# Patient Record
Sex: FEMALE | Race: WHITE | NOT HISPANIC OR LATINO | Employment: UNEMPLOYED | URBAN - METROPOLITAN AREA
[De-identification: names, ages, dates, MRNs, and addresses within clinical notes are randomized per-mention and may not be internally consistent; named-entity substitution may affect disease eponyms.]

---

## 2017-04-29 ENCOUNTER — HOSPITAL ENCOUNTER (EMERGENCY)
Facility: HOSPITAL | Age: 57
Discharge: HOME | End: 2017-04-29
Attending: EMERGENCY MEDICINE | Admitting: EMERGENCY MEDICINE
Payer: COMMERCIAL

## 2017-04-29 VITALS
DIASTOLIC BLOOD PRESSURE: 59 MMHG | OXYGEN SATURATION: 98 % | WEIGHT: 195 LBS | BODY MASS INDEX: 29.65 KG/M2 | SYSTOLIC BLOOD PRESSURE: 120 MMHG | HEART RATE: 49 BPM | RESPIRATION RATE: 20 BRPM | TEMPERATURE: 98.6 F

## 2017-04-29 DIAGNOSIS — G43.909 MIGRAINE: Primary | ICD-10-CM

## 2017-04-29 DIAGNOSIS — F41.9 ANXIETY: ICD-10-CM

## 2017-04-29 PROCEDURE — 96374 THER/PROPH/DIAG INJ IV PUSH: CPT

## 2017-04-29 PROCEDURE — 99283 EMERGENCY DEPT VISIT LOW MDM: CPT

## 2017-04-29 PROCEDURE — 96361 HYDRATE IV INFUSION ADD-ON: CPT

## 2017-04-29 PROCEDURE — 96372 THER/PROPH/DIAG INJ SC/IM: CPT

## 2017-04-29 PROCEDURE — 96375 TX/PRO/DX INJ NEW DRUG ADDON: CPT

## 2017-04-29 RX ORDER — METOCLOPRAMIDE HYDROCHLORIDE 5 MG/ML
10 INJECTION INTRAMUSCULAR; INTRAVENOUS ONCE
Status: COMPLETED | OUTPATIENT
Start: 2017-04-29 | End: 2017-04-29

## 2017-04-29 RX ORDER — LORAZEPAM 2 MG/ML
0.5 INJECTION INTRAMUSCULAR ONCE
Status: COMPLETED | OUTPATIENT
Start: 2017-04-29 | End: 2017-04-29

## 2017-04-29 RX ORDER — OLANZAPINE 10 MG/1
10 INJECTION, POWDER, LYOPHILIZED, FOR SOLUTION INTRAMUSCULAR ONCE
Status: COMPLETED | OUTPATIENT
Start: 2017-04-29 | End: 2017-04-29

## 2017-04-29 RX ADMIN — WATER 2.1 ML: 1 INJECTION INTRAMUSCULAR; INTRAVENOUS; SUBCUTANEOUS at 12:33

## 2017-04-29 RX ADMIN — OLANZAPINE 10 MG: 10 INJECTION, POWDER, FOR SOLUTION INTRAMUSCULAR at 12:33

## 2017-04-29 RX ADMIN — METOCLOPRAMIDE 10 MG: 5 INJECTION, SOLUTION INTRAMUSCULAR; INTRAVENOUS at 11:12

## 2017-04-29 RX ADMIN — Medication 2.1 ML: at 12:33

## 2017-04-29 RX ADMIN — LORAZEPAM 0.5 MG: 2 INJECTION INTRAMUSCULAR; INTRAVENOUS at 11:10

## 2017-04-29 RX ADMIN — SODIUM CHLORIDE 1000 ML: 0.9 INJECTION, SOLUTION INTRAVENOUS at 11:08

## 2017-05-28 ENCOUNTER — HOSPITAL ENCOUNTER (INPATIENT)
Facility: HOSPITAL | Age: 57
LOS: 2 days | Discharge: HOME | DRG: 103 | End: 2017-05-31
Attending: INTERNAL MEDICINE | Admitting: HOSPITALIST
Payer: COMMERCIAL

## 2017-05-28 ENCOUNTER — APPOINTMENT (EMERGENCY)
Dept: RADIOLOGY | Facility: HOSPITAL | Age: 57
DRG: 103 | End: 2017-05-28
Payer: COMMERCIAL

## 2017-05-28 ENCOUNTER — APPOINTMENT (OUTPATIENT)
Dept: MRI IMAGING | Facility: HOSPITAL | Age: 57
DRG: 103 | End: 2017-05-28
Payer: COMMERCIAL

## 2017-05-28 ENCOUNTER — APPOINTMENT (EMERGENCY)
Dept: CT IMAGING | Facility: HOSPITAL | Age: 57
DRG: 103 | End: 2017-05-28
Payer: COMMERCIAL

## 2017-05-28 DIAGNOSIS — G43.909 MIGRAINE: Primary | ICD-10-CM

## 2017-05-28 DIAGNOSIS — R20.2 HEMIPARESTHESIA: ICD-10-CM

## 2017-05-28 PROBLEM — I10 HTN (HYPERTENSION): Chronic | Status: ACTIVE | Noted: 2017-05-28

## 2017-05-28 PROBLEM — E78.5 HLD (HYPERLIPIDEMIA): Chronic | Status: ACTIVE | Noted: 2017-05-28

## 2017-05-28 LAB
ALBUMIN SERPL BCP-MCNC: 3.5 G/DL (ref 3.5–5)
ALP SERPL-CCNC: 102 U/L (ref 46–116)
ALT SERPL W P-5'-P-CCNC: 27 U/L (ref 12–78)
ANION GAP SERPL CALCULATED.3IONS-SCNC: 7 MMOL/L (ref 4–13)
APTT PPP: 28 SECONDS (ref 23–35)
AST SERPL W P-5'-P-CCNC: 17 U/L (ref 5–45)
BASOPHILS # BLD AUTO: 0.05 THOUSANDS/ΜL (ref 0–0.1)
BASOPHILS NFR BLD AUTO: 1 % (ref 0–1)
BILIRUB SERPL-MCNC: 0.25 MG/DL (ref 0.2–1)
BILIRUB UR QL STRIP: NEGATIVE
BUN SERPL-MCNC: 12 MG/DL (ref 5–25)
CALCIUM SERPL-MCNC: 8.8 MG/DL (ref 8.3–10.1)
CHLORIDE SERPL-SCNC: 109 MMOL/L (ref 100–108)
CLARITY UR: CLEAR
CO2 SERPL-SCNC: 30 MMOL/L (ref 21–32)
COLOR UR: YELLOW
CREAT SERPL-MCNC: 0.97 MG/DL (ref 0.6–1.3)
EOSINOPHIL # BLD AUTO: 0.06 THOUSAND/ΜL (ref 0–0.61)
EOSINOPHIL NFR BLD AUTO: 1 % (ref 0–6)
ERYTHROCYTE [DISTWIDTH] IN BLOOD BY AUTOMATED COUNT: 14.4 % (ref 11.6–15.1)
ERYTHROCYTE [SEDIMENTATION RATE] IN BLOOD: 15 MM/HOUR (ref 0–20)
GFR SERPL CREATININE-BSD FRML MDRD: 59 ML/MIN/1.73SQ M
GLUCOSE SERPL-MCNC: 116 MG/DL (ref 65–140)
GLUCOSE UR STRIP-MCNC: NEGATIVE MG/DL
HCT VFR BLD AUTO: 38.3 % (ref 34.8–46.1)
HGB BLD-MCNC: 12.9 G/DL (ref 11.5–15.4)
HGB UR QL STRIP.AUTO: NEGATIVE
INR PPP: 0.91 (ref 0.86–1.16)
KETONES UR STRIP-MCNC: NEGATIVE MG/DL
LEUKOCYTE ESTERASE UR QL STRIP: NEGATIVE
LYMPHOCYTES # BLD AUTO: 1.85 THOUSANDS/ΜL (ref 0.6–4.47)
LYMPHOCYTES NFR BLD AUTO: 19 % (ref 14–44)
MCH RBC QN AUTO: 32.5 PG (ref 26.8–34.3)
MCHC RBC AUTO-ENTMCNC: 33.7 G/DL (ref 31.4–37.4)
MCV RBC AUTO: 97 FL (ref 82–98)
MONOCYTES # BLD AUTO: 0.54 THOUSAND/ΜL (ref 0.17–1.22)
MONOCYTES NFR BLD AUTO: 6 % (ref 4–12)
NEUTROPHILS # BLD AUTO: 7.23 THOUSANDS/ΜL (ref 1.85–7.62)
NEUTS SEG NFR BLD AUTO: 73 % (ref 43–75)
NITRITE UR QL STRIP: NEGATIVE
NRBC BLD AUTO-RTO: 0 /100 WBCS
PH UR STRIP.AUTO: 5.5 [PH] (ref 4.5–8)
PLATELET # BLD AUTO: 368 THOUSANDS/UL (ref 149–390)
PMV BLD AUTO: 9.8 FL (ref 8.9–12.7)
POTASSIUM SERPL-SCNC: 3.2 MMOL/L (ref 3.5–5.3)
PROT SERPL-MCNC: 7.2 G/DL (ref 6.4–8.2)
PROT UR STRIP-MCNC: NEGATIVE MG/DL
PROTHROMBIN TIME: 12.3 SECONDS (ref 12.1–14.4)
RBC # BLD AUTO: 3.97 MILLION/UL (ref 3.81–5.12)
SODIUM SERPL-SCNC: 146 MMOL/L (ref 136–145)
SP GR UR STRIP.AUTO: 1.01 (ref 1–1.03)
UROBILINOGEN UR QL STRIP.AUTO: 0.2 E.U./DL
WBC # BLD AUTO: 9.73 THOUSAND/UL (ref 4.31–10.16)

## 2017-05-28 PROCEDURE — 80053 COMPREHEN METABOLIC PANEL: CPT | Performed by: PHYSICIAN ASSISTANT

## 2017-05-28 PROCEDURE — A9270 NON-COVERED ITEM OR SERVICE: HCPCS | Performed by: PHYSICIAN ASSISTANT

## 2017-05-28 PROCEDURE — 96361 HYDRATE IV INFUSION ADD-ON: CPT

## 2017-05-28 PROCEDURE — 96374 THER/PROPH/DIAG INJ IV PUSH: CPT

## 2017-05-28 PROCEDURE — 85730 THROMBOPLASTIN TIME PARTIAL: CPT | Performed by: PHYSICIAN ASSISTANT

## 2017-05-28 PROCEDURE — 85652 RBC SED RATE AUTOMATED: CPT | Performed by: PHYSICIAN ASSISTANT

## 2017-05-28 PROCEDURE — 96372 THER/PROPH/DIAG INJ SC/IM: CPT

## 2017-05-28 PROCEDURE — 85025 COMPLETE CBC W/AUTO DIFF WBC: CPT | Performed by: PHYSICIAN ASSISTANT

## 2017-05-28 PROCEDURE — 85610 PROTHROMBIN TIME: CPT | Performed by: PHYSICIAN ASSISTANT

## 2017-05-28 PROCEDURE — 96375 TX/PRO/DX INJ NEW DRUG ADDON: CPT

## 2017-05-28 PROCEDURE — 70551 MRI BRAIN STEM W/O DYE: CPT

## 2017-05-28 PROCEDURE — 36415 COLL VENOUS BLD VENIPUNCTURE: CPT | Performed by: PHYSICIAN ASSISTANT

## 2017-05-28 PROCEDURE — 70450 CT HEAD/BRAIN W/O DYE: CPT

## 2017-05-28 PROCEDURE — 70544 MR ANGIOGRAPHY HEAD W/O DYE: CPT

## 2017-05-28 PROCEDURE — 99285 EMERGENCY DEPT VISIT HI MDM: CPT

## 2017-05-28 PROCEDURE — 71020 HB CHEST X-RAY 2VW FRONTAL&LATL: CPT

## 2017-05-28 PROCEDURE — 81003 URINALYSIS AUTO W/O SCOPE: CPT | Performed by: PHYSICIAN ASSISTANT

## 2017-05-28 PROCEDURE — 87493 C DIFF AMPLIFIED PROBE: CPT | Performed by: PHYSICIAN ASSISTANT

## 2017-05-28 RX ORDER — ASPIRIN 325 MG
325 TABLET ORAL DAILY
Status: DISCONTINUED | OUTPATIENT
Start: 2017-05-29 | End: 2017-05-31 | Stop reason: HOSPADM

## 2017-05-28 RX ORDER — ARIPIPRAZOLE 10 MG/1
10 TABLET ORAL DAILY
Status: DISCONTINUED | OUTPATIENT
Start: 2017-05-29 | End: 2017-05-31 | Stop reason: HOSPADM

## 2017-05-28 RX ORDER — DIPHENHYDRAMINE HCL 25 MG
50 TABLET ORAL ONCE
Status: COMPLETED | OUTPATIENT
Start: 2017-05-28 | End: 2017-05-28

## 2017-05-28 RX ORDER — LORAZEPAM 2 MG/ML
2 INJECTION INTRAMUSCULAR ONCE AS NEEDED
Status: COMPLETED | OUTPATIENT
Start: 2017-05-28 | End: 2017-05-28

## 2017-05-28 RX ORDER — KETOROLAC TROMETHAMINE 30 MG/ML
30 INJECTION, SOLUTION INTRAMUSCULAR; INTRAVENOUS EVERY 6 HOURS PRN
Status: DISCONTINUED | OUTPATIENT
Start: 2017-05-28 | End: 2017-05-31 | Stop reason: HOSPADM

## 2017-05-28 RX ORDER — LAMOTRIGINE 100 MG/1
200 TABLET ORAL DAILY
Status: DISCONTINUED | OUTPATIENT
Start: 2017-05-30 | End: 2017-05-31 | Stop reason: HOSPADM

## 2017-05-28 RX ORDER — LORAZEPAM 1 MG/1
2 TABLET ORAL EVERY 6 HOURS PRN
Status: DISCONTINUED | OUTPATIENT
Start: 2017-05-28 | End: 2017-05-29

## 2017-05-28 RX ORDER — OLANZAPINE 10 MG/1
10 INJECTION, POWDER, LYOPHILIZED, FOR SOLUTION INTRAMUSCULAR ONCE
Status: COMPLETED | OUTPATIENT
Start: 2017-05-28 | End: 2017-05-28

## 2017-05-28 RX ORDER — FUROSEMIDE 20 MG/1
20 TABLET ORAL DAILY
Status: DISCONTINUED | OUTPATIENT
Start: 2017-05-28 | End: 2017-05-28

## 2017-05-28 RX ORDER — SODIUM CHLORIDE 9 MG/ML
50 INJECTION, SOLUTION INTRAVENOUS CONTINUOUS
Status: DISCONTINUED | OUTPATIENT
Start: 2017-05-28 | End: 2017-05-31 | Stop reason: HOSPADM

## 2017-05-28 RX ORDER — POTASSIUM CHLORIDE 20 MEQ/1
40 TABLET, EXTENDED RELEASE ORAL ONCE
Status: COMPLETED | OUTPATIENT
Start: 2017-05-28 | End: 2017-05-28

## 2017-05-28 RX ORDER — DIPHENHYDRAMINE HYDROCHLORIDE 50 MG/ML
25 INJECTION INTRAMUSCULAR; INTRAVENOUS EVERY 6 HOURS PRN
Status: DISCONTINUED | OUTPATIENT
Start: 2017-05-28 | End: 2017-05-31 | Stop reason: HOSPADM

## 2017-05-28 RX ORDER — FLUVOXAMINE MALEATE 50 MG/1
50 TABLET, COATED ORAL
Status: DISCONTINUED | OUTPATIENT
Start: 2017-05-28 | End: 2017-05-31 | Stop reason: HOSPADM

## 2017-05-28 RX ORDER — METOCLOPRAMIDE HYDROCHLORIDE 5 MG/ML
10 INJECTION INTRAMUSCULAR; INTRAVENOUS EVERY 6 HOURS PRN
Status: DISCONTINUED | OUTPATIENT
Start: 2017-05-28 | End: 2017-05-31 | Stop reason: HOSPADM

## 2017-05-28 RX ORDER — ZOLPIDEM TARTRATE 5 MG/1
10 TABLET ORAL
Status: DISCONTINUED | OUTPATIENT
Start: 2017-05-28 | End: 2017-05-31 | Stop reason: HOSPADM

## 2017-05-28 RX ORDER — BUTALBITAL, ACETAMINOPHEN AND CAFFEINE 50; 325; 40 MG/1; MG/1; MG/1
1 TABLET ORAL EVERY 4 HOURS PRN
Status: DISCONTINUED | OUTPATIENT
Start: 2017-05-28 | End: 2017-05-28 | Stop reason: SDUPTHER

## 2017-05-28 RX ORDER — LAMOTRIGINE 100 MG/1
100 TABLET ORAL ONCE
Status: COMPLETED | OUTPATIENT
Start: 2017-05-29 | End: 2017-05-29

## 2017-05-28 RX ORDER — HYDROCORTISONE 10 MG/1
10 TABLET ORAL 2 TIMES DAILY
Status: DISCONTINUED | OUTPATIENT
Start: 2017-05-28 | End: 2017-05-31 | Stop reason: HOSPADM

## 2017-05-28 RX ORDER — GABAPENTIN 300 MG/1
600 CAPSULE ORAL 3 TIMES DAILY
Status: DISCONTINUED | OUTPATIENT
Start: 2017-05-28 | End: 2017-05-31 | Stop reason: HOSPADM

## 2017-05-28 RX ORDER — MAGNESIUM SULFATE HEPTAHYDRATE 40 MG/ML
2 INJECTION, SOLUTION INTRAVENOUS EVERY 6 HOURS SCHEDULED
Status: DISCONTINUED | OUTPATIENT
Start: 2017-05-28 | End: 2017-05-30

## 2017-05-28 RX ORDER — BUTALBITAL, ACETAMINOPHEN AND CAFFEINE 50; 325; 40 MG/1; MG/1; MG/1
1 TABLET ORAL EVERY 4 HOURS PRN
Status: DISCONTINUED | OUTPATIENT
Start: 2017-05-28 | End: 2017-05-31 | Stop reason: HOSPADM

## 2017-05-28 RX ORDER — ONDANSETRON 4 MG/1
4 TABLET, ORALLY DISINTEGRATING ORAL EVERY 8 HOURS PRN
Status: DISCONTINUED | OUTPATIENT
Start: 2017-05-28 | End: 2017-05-31 | Stop reason: HOSPADM

## 2017-05-28 RX ORDER — LORAZEPAM 2 MG/ML
1 INJECTION INTRAMUSCULAR ONCE
Status: COMPLETED | OUTPATIENT
Start: 2017-05-28 | End: 2017-05-28

## 2017-05-28 RX ORDER — METOCLOPRAMIDE HYDROCHLORIDE 5 MG/ML
10 INJECTION INTRAMUSCULAR; INTRAVENOUS ONCE
Status: COMPLETED | OUTPATIENT
Start: 2017-05-28 | End: 2017-05-28

## 2017-05-28 RX ORDER — ACETAMINOPHEN 325 MG/1
975 TABLET ORAL ONCE
Status: COMPLETED | OUTPATIENT
Start: 2017-05-28 | End: 2017-05-28

## 2017-05-28 RX ORDER — LORAZEPAM 2 MG/ML
INJECTION INTRAMUSCULAR
Status: DISPENSED
Start: 2017-05-28 | End: 2017-05-29

## 2017-05-28 RX ORDER — MORPHINE SULFATE 2 MG/ML
2 INJECTION, SOLUTION INTRAMUSCULAR; INTRAVENOUS ONCE
Status: COMPLETED | OUTPATIENT
Start: 2017-05-28 | End: 2017-05-28

## 2017-05-28 RX ADMIN — HYDROCORTISONE 10 MG: 10 TABLET ORAL at 17:42

## 2017-05-28 RX ADMIN — ACETAMINOPHEN 975 MG: 325 TABLET, FILM COATED ORAL at 08:14

## 2017-05-28 RX ADMIN — KETOROLAC TROMETHAMINE 30 MG: 30 INJECTION, SOLUTION INTRAMUSCULAR at 16:42

## 2017-05-28 RX ADMIN — DIPHENHYDRAMINE HCL 50 MG: 25 TABLET ORAL at 08:14

## 2017-05-28 RX ADMIN — LORAZEPAM 1 MG: 2 INJECTION INTRAMUSCULAR; INTRAVENOUS at 09:57

## 2017-05-28 RX ADMIN — LORAZEPAM 2 MG: 2 INJECTION INTRAMUSCULAR; INTRAVENOUS at 14:26

## 2017-05-28 RX ADMIN — GABAPENTIN 600 MG: 300 CAPSULE ORAL at 21:30

## 2017-05-28 RX ADMIN — WATER 2.1 ML: 1 INJECTION INTRAMUSCULAR; INTRAVENOUS; SUBCUTANEOUS at 09:09

## 2017-05-28 RX ADMIN — FUROSEMIDE 20 MG: 20 TABLET ORAL at 16:03

## 2017-05-28 RX ADMIN — SODIUM CHLORIDE 75 ML/HR: 0.9 INJECTION, SOLUTION INTRAVENOUS at 14:26

## 2017-05-28 RX ADMIN — DIPHENHYDRAMINE HYDROCHLORIDE 25 MG: 50 INJECTION, SOLUTION INTRAMUSCULAR; INTRAVENOUS at 16:42

## 2017-05-28 RX ADMIN — DIPHENHYDRAMINE HYDROCHLORIDE 25 MG: 50 INJECTION, SOLUTION INTRAMUSCULAR; INTRAVENOUS at 23:59

## 2017-05-28 RX ADMIN — POTASSIUM CHLORIDE 40 MEQ: 1500 TABLET, EXTENDED RELEASE ORAL at 16:03

## 2017-05-28 RX ADMIN — LORAZEPAM 2 MG: 1 TABLET ORAL at 21:29

## 2017-05-28 RX ADMIN — MORPHINE SULFATE 2 MG: 2 INJECTION, SOLUTION INTRAMUSCULAR; INTRAVENOUS at 11:08

## 2017-05-28 RX ADMIN — MAGNESIUM SULFATE HEPTAHYDRATE 2 G: 40 INJECTION, SOLUTION INTRAVENOUS at 19:00

## 2017-05-28 RX ADMIN — OLANZAPINE 10 MG: 10 INJECTION, POWDER, LYOPHILIZED, FOR SOLUTION INTRAMUSCULAR at 09:08

## 2017-05-28 RX ADMIN — SODIUM CHLORIDE 1000 ML: 0.9 INJECTION, SOLUTION INTRAVENOUS at 08:26

## 2017-05-28 RX ADMIN — Medication 2.1 ML: at 09:09

## 2017-05-28 RX ADMIN — METOCLOPRAMIDE 10 MG: 5 INJECTION, SOLUTION INTRAMUSCULAR; INTRAVENOUS at 08:26

## 2017-05-28 RX ADMIN — METOCLOPRAMIDE 10 MG: 5 INJECTION, SOLUTION INTRAMUSCULAR; INTRAVENOUS at 23:59

## 2017-05-28 RX ADMIN — SODIUM CHLORIDE 750 MG: 0.9 INJECTION, SOLUTION INTRAVENOUS at 16:03

## 2017-05-28 RX ADMIN — METOCLOPRAMIDE 10 MG: 5 INJECTION, SOLUTION INTRAMUSCULAR; INTRAVENOUS at 16:42

## 2017-05-28 RX ADMIN — VALPROATE SODIUM 1000 MG: 100 INJECTION, SOLUTION INTRAVENOUS at 17:42

## 2017-05-28 RX ADMIN — FLUVOXAMINE MALEATE 50 MG: 50 TABLET, FILM COATED ORAL at 21:29

## 2017-05-28 RX ADMIN — GABAPENTIN 600 MG: 300 CAPSULE ORAL at 16:03

## 2017-05-29 LAB
ALBUMIN SERPL BCP-MCNC: 2.9 G/DL (ref 3.5–5)
ALP SERPL-CCNC: 96 U/L (ref 46–116)
ALT SERPL W P-5'-P-CCNC: 21 U/L (ref 12–78)
ANION GAP SERPL CALCULATED.3IONS-SCNC: 12 MMOL/L (ref 4–13)
AST SERPL W P-5'-P-CCNC: 13 U/L (ref 5–45)
BILIRUB SERPL-MCNC: 0.19 MG/DL (ref 0.2–1)
BUN SERPL-MCNC: 16 MG/DL (ref 5–25)
C DIFF TOX GENS STL QL NAA+PROBE: NORMAL
CALCIUM SERPL-MCNC: 8.8 MG/DL (ref 8.3–10.1)
CHLORIDE SERPL-SCNC: 110 MMOL/L (ref 100–108)
CO2 SERPL-SCNC: 22 MMOL/L (ref 21–32)
CREAT SERPL-MCNC: 0.94 MG/DL (ref 0.6–1.3)
ERYTHROCYTE [DISTWIDTH] IN BLOOD BY AUTOMATED COUNT: 13.9 % (ref 11.6–15.1)
GFR SERPL CREATININE-BSD FRML MDRD: >60 ML/MIN/1.73SQ M
GLUCOSE SERPL-MCNC: 141 MG/DL (ref 65–140)
HCT VFR BLD AUTO: 37.3 % (ref 34.8–46.1)
HGB BLD-MCNC: 12.1 G/DL (ref 11.5–15.4)
MCH RBC QN AUTO: 31.4 PG (ref 26.8–34.3)
MCHC RBC AUTO-ENTMCNC: 32.4 G/DL (ref 31.4–37.4)
MCV RBC AUTO: 97 FL (ref 82–98)
PLATELET # BLD AUTO: 356 THOUSANDS/UL (ref 149–390)
PMV BLD AUTO: 10.1 FL (ref 8.9–12.7)
POTASSIUM SERPL-SCNC: 3.8 MMOL/L (ref 3.5–5.3)
PROT SERPL-MCNC: 6.7 G/DL (ref 6.4–8.2)
RBC # BLD AUTO: 3.85 MILLION/UL (ref 3.81–5.12)
SODIUM SERPL-SCNC: 144 MMOL/L (ref 136–145)
WBC # BLD AUTO: 8.16 THOUSAND/UL (ref 4.31–10.16)

## 2017-05-29 PROCEDURE — A9270 NON-COVERED ITEM OR SERVICE: HCPCS | Performed by: PHYSICIAN ASSISTANT

## 2017-05-29 PROCEDURE — A9270 NON-COVERED ITEM OR SERVICE: HCPCS | Performed by: PSYCHIATRY & NEUROLOGY

## 2017-05-29 PROCEDURE — 85027 COMPLETE CBC AUTOMATED: CPT | Performed by: INTERNAL MEDICINE

## 2017-05-29 PROCEDURE — A9270 NON-COVERED ITEM OR SERVICE: HCPCS | Performed by: HOSPITALIST

## 2017-05-29 PROCEDURE — 80053 COMPREHEN METABOLIC PANEL: CPT | Performed by: INTERNAL MEDICINE

## 2017-05-29 RX ORDER — LORAZEPAM 1 MG/1
2 TABLET ORAL EVERY 6 HOURS PRN
Status: DISCONTINUED | OUTPATIENT
Start: 2017-05-29 | End: 2017-05-31 | Stop reason: HOSPADM

## 2017-05-29 RX ADMIN — ZOLPIDEM TARTRATE 10 MG: 5 TABLET, FILM COATED ORAL at 23:21

## 2017-05-29 RX ADMIN — VALPROATE SODIUM 1000 MG: 100 INJECTION, SOLUTION INTRAVENOUS at 02:43

## 2017-05-29 RX ADMIN — MAGNESIUM SULFATE HEPTAHYDRATE 2 G: 40 INJECTION, SOLUTION INTRAVENOUS at 06:02

## 2017-05-29 RX ADMIN — MAGNESIUM SULFATE HEPTAHYDRATE 2 G: 40 INJECTION, SOLUTION INTRAVENOUS at 12:16

## 2017-05-29 RX ADMIN — KETOROLAC TROMETHAMINE 30 MG: 30 INJECTION, SOLUTION INTRAMUSCULAR at 00:00

## 2017-05-29 RX ADMIN — DIPHENHYDRAMINE HYDROCHLORIDE 25 MG: 50 INJECTION, SOLUTION INTRAMUSCULAR; INTRAVENOUS at 05:57

## 2017-05-29 RX ADMIN — ZOLPIDEM TARTRATE 10 MG: 5 TABLET, FILM COATED ORAL at 00:05

## 2017-05-29 RX ADMIN — BUTALBITAL, ACETAMINOPHEN, AND CAFFEINE 1 TABLET: 50; 325; 40 TABLET ORAL at 20:17

## 2017-05-29 RX ADMIN — ENOXAPARIN SODIUM 40 MG: 40 INJECTION SUBCUTANEOUS at 12:16

## 2017-05-29 RX ADMIN — VALPROATE SODIUM 1000 MG: 100 INJECTION, SOLUTION INTRAVENOUS at 10:50

## 2017-05-29 RX ADMIN — LAMOTRIGINE 100 MG: 100 TABLET ORAL at 09:19

## 2017-05-29 RX ADMIN — GABAPENTIN 600 MG: 300 CAPSULE ORAL at 09:19

## 2017-05-29 RX ADMIN — KETOROLAC TROMETHAMINE 30 MG: 30 INJECTION, SOLUTION INTRAMUSCULAR at 14:04

## 2017-05-29 RX ADMIN — LORAZEPAM 2 MG: 1 TABLET ORAL at 21:39

## 2017-05-29 RX ADMIN — MAGNESIUM SULFATE HEPTAHYDRATE 2 G: 40 INJECTION, SOLUTION INTRAVENOUS at 23:21

## 2017-05-29 RX ADMIN — ARIPIPRAZOLE 10 MG: 10 TABLET ORAL at 09:19

## 2017-05-29 RX ADMIN — SODIUM CHLORIDE 75 ML/HR: 0.9 INJECTION, SOLUTION INTRAVENOUS at 09:19

## 2017-05-29 RX ADMIN — ASPIRIN 325 MG: 325 TABLET ORAL at 09:19

## 2017-05-29 RX ADMIN — GABAPENTIN 600 MG: 300 CAPSULE ORAL at 17:38

## 2017-05-29 RX ADMIN — KETOROLAC TROMETHAMINE 30 MG: 30 INJECTION, SOLUTION INTRAMUSCULAR at 20:24

## 2017-05-29 RX ADMIN — METOCLOPRAMIDE 10 MG: 5 INJECTION, SOLUTION INTRAMUSCULAR; INTRAVENOUS at 20:22

## 2017-05-29 RX ADMIN — LORAZEPAM 2 MG: 1 TABLET ORAL at 15:34

## 2017-05-29 RX ADMIN — DIPHENHYDRAMINE HYDROCHLORIDE 25 MG: 50 INJECTION, SOLUTION INTRAMUSCULAR; INTRAVENOUS at 14:04

## 2017-05-29 RX ADMIN — DIPHENHYDRAMINE HYDROCHLORIDE 25 MG: 50 INJECTION, SOLUTION INTRAMUSCULAR; INTRAVENOUS at 20:18

## 2017-05-29 RX ADMIN — GABAPENTIN 600 MG: 300 CAPSULE ORAL at 20:17

## 2017-05-29 RX ADMIN — LORAZEPAM 2 MG: 1 TABLET ORAL at 09:19

## 2017-05-29 RX ADMIN — KETOROLAC TROMETHAMINE 30 MG: 30 INJECTION, SOLUTION INTRAMUSCULAR at 06:00

## 2017-05-29 RX ADMIN — BUTALBITAL, ACETAMINOPHEN, AND CAFFEINE 1 TABLET: 50; 325; 40 TABLET ORAL at 06:08

## 2017-05-29 RX ADMIN — SODIUM CHLORIDE 750 MG: 0.9 INJECTION, SOLUTION INTRAVENOUS at 09:20

## 2017-05-29 RX ADMIN — BUTALBITAL, ACETAMINOPHEN, AND CAFFEINE 1 TABLET: 50; 325; 40 TABLET ORAL at 11:00

## 2017-05-29 RX ADMIN — METOCLOPRAMIDE 10 MG: 5 INJECTION, SOLUTION INTRAMUSCULAR; INTRAVENOUS at 05:59

## 2017-05-29 RX ADMIN — METOCLOPRAMIDE 10 MG: 5 INJECTION, SOLUTION INTRAMUSCULAR; INTRAVENOUS at 14:04

## 2017-05-29 RX ADMIN — FLUVOXAMINE MALEATE 50 MG: 50 TABLET, FILM COATED ORAL at 21:39

## 2017-05-29 RX ADMIN — HYDROCORTISONE 10 MG: 10 TABLET ORAL at 17:38

## 2017-05-29 RX ADMIN — HYDROCORTISONE 10 MG: 10 TABLET ORAL at 09:19

## 2017-05-29 RX ADMIN — MAGNESIUM SULFATE HEPTAHYDRATE 2 G: 40 INJECTION, SOLUTION INTRAVENOUS at 00:40

## 2017-05-30 PROCEDURE — A9270 NON-COVERED ITEM OR SERVICE: HCPCS | Performed by: HOSPITALIST

## 2017-05-30 PROCEDURE — A9270 NON-COVERED ITEM OR SERVICE: HCPCS | Performed by: PHYSICIAN ASSISTANT

## 2017-05-30 PROCEDURE — A9270 NON-COVERED ITEM OR SERVICE: HCPCS | Performed by: PSYCHIATRY & NEUROLOGY

## 2017-05-30 RX ORDER — LOPERAMIDE HYDROCHLORIDE 2 MG/1
2 CAPSULE ORAL EVERY 4 HOURS PRN
Status: DISCONTINUED | OUTPATIENT
Start: 2017-05-30 | End: 2017-05-31 | Stop reason: HOSPADM

## 2017-05-30 RX ORDER — DICYCLOMINE HYDROCHLORIDE 10 MG/1
10 CAPSULE ORAL
Status: DISCONTINUED | OUTPATIENT
Start: 2017-05-30 | End: 2017-05-31 | Stop reason: HOSPADM

## 2017-05-30 RX ORDER — LORAZEPAM 1 MG/1
4 TABLET ORAL EVERY 8 HOURS PRN
Status: DISCONTINUED | OUTPATIENT
Start: 2017-05-30 | End: 2017-05-31 | Stop reason: HOSPADM

## 2017-05-30 RX ADMIN — KETOROLAC TROMETHAMINE 30 MG: 30 INJECTION, SOLUTION INTRAMUSCULAR at 08:42

## 2017-05-30 RX ADMIN — LORAZEPAM 2 MG: 1 TABLET ORAL at 06:22

## 2017-05-30 RX ADMIN — DIPHENHYDRAMINE HYDROCHLORIDE 25 MG: 50 INJECTION, SOLUTION INTRAMUSCULAR; INTRAVENOUS at 21:01

## 2017-05-30 RX ADMIN — KETOROLAC TROMETHAMINE 30 MG: 30 INJECTION, SOLUTION INTRAMUSCULAR at 02:24

## 2017-05-30 RX ADMIN — BUTALBITAL, ACETAMINOPHEN, AND CAFFEINE 1 TABLET: 50; 325; 40 TABLET ORAL at 17:56

## 2017-05-30 RX ADMIN — KETOROLAC TROMETHAMINE 30 MG: 30 INJECTION, SOLUTION INTRAMUSCULAR at 14:44

## 2017-05-30 RX ADMIN — DIPHENHYDRAMINE HYDROCHLORIDE 25 MG: 50 INJECTION, SOLUTION INTRAMUSCULAR; INTRAVENOUS at 02:20

## 2017-05-30 RX ADMIN — DIPHENHYDRAMINE HYDROCHLORIDE 25 MG: 50 INJECTION, SOLUTION INTRAMUSCULAR; INTRAVENOUS at 14:44

## 2017-05-30 RX ADMIN — ARIPIPRAZOLE 10 MG: 10 TABLET ORAL at 08:51

## 2017-05-30 RX ADMIN — DICYCLOMINE HYDROCHLORIDE 10 MG: 10 CAPSULE ORAL at 17:41

## 2017-05-30 RX ADMIN — LORAZEPAM 4 MG: 1 TABLET ORAL at 21:03

## 2017-05-30 RX ADMIN — GABAPENTIN 600 MG: 300 CAPSULE ORAL at 17:40

## 2017-05-30 RX ADMIN — LAMOTRIGINE 200 MG: 100 TABLET ORAL at 08:52

## 2017-05-30 RX ADMIN — METOCLOPRAMIDE 10 MG: 5 INJECTION, SOLUTION INTRAMUSCULAR; INTRAVENOUS at 02:22

## 2017-05-30 RX ADMIN — SODIUM CHLORIDE 750 MG: 0.9 INJECTION, SOLUTION INTRAVENOUS at 08:52

## 2017-05-30 RX ADMIN — KETOROLAC TROMETHAMINE 30 MG: 30 INJECTION, SOLUTION INTRAMUSCULAR at 21:01

## 2017-05-30 RX ADMIN — METOCLOPRAMIDE 10 MG: 5 INJECTION, SOLUTION INTRAMUSCULAR; INTRAVENOUS at 21:00

## 2017-05-30 RX ADMIN — METOCLOPRAMIDE 10 MG: 5 INJECTION, SOLUTION INTRAMUSCULAR; INTRAVENOUS at 14:44

## 2017-05-30 RX ADMIN — DICYCLOMINE HYDROCHLORIDE 10 MG: 10 CAPSULE ORAL at 10:51

## 2017-05-30 RX ADMIN — FLUVOXAMINE MALEATE 50 MG: 50 TABLET, FILM COATED ORAL at 21:01

## 2017-05-30 RX ADMIN — LOPERAMIDE HYDROCHLORIDE 2 MG: 2 CAPSULE ORAL at 10:51

## 2017-05-30 RX ADMIN — BUTALBITAL, ACETAMINOPHEN, AND CAFFEINE 1 TABLET: 50; 325; 40 TABLET ORAL at 11:33

## 2017-05-30 RX ADMIN — HYDROCORTISONE 10 MG: 10 TABLET ORAL at 08:52

## 2017-05-30 RX ADMIN — ASPIRIN 325 MG: 325 TABLET ORAL at 08:51

## 2017-05-30 RX ADMIN — ENOXAPARIN SODIUM 40 MG: 40 INJECTION SUBCUTANEOUS at 08:51

## 2017-05-30 RX ADMIN — BUTALBITAL, ACETAMINOPHEN, AND CAFFEINE 1 TABLET: 50; 325; 40 TABLET ORAL at 07:49

## 2017-05-30 RX ADMIN — LORAZEPAM 2 MG: 1 TABLET ORAL at 14:44

## 2017-05-30 RX ADMIN — BUTALBITAL, ACETAMINOPHEN, AND CAFFEINE 1 TABLET: 50; 325; 40 TABLET ORAL at 02:19

## 2017-05-30 RX ADMIN — METOCLOPRAMIDE 10 MG: 5 INJECTION, SOLUTION INTRAMUSCULAR; INTRAVENOUS at 08:42

## 2017-05-30 RX ADMIN — HYDROCORTISONE 10 MG: 10 TABLET ORAL at 17:41

## 2017-05-30 RX ADMIN — DIPHENHYDRAMINE HYDROCHLORIDE 25 MG: 50 INJECTION, SOLUTION INTRAMUSCULAR; INTRAVENOUS at 08:42

## 2017-05-30 RX ADMIN — MAGNESIUM SULFATE HEPTAHYDRATE 2 G: 40 INJECTION, SOLUTION INTRAVENOUS at 05:39

## 2017-05-30 RX ADMIN — GABAPENTIN 600 MG: 300 CAPSULE ORAL at 08:52

## 2017-05-30 RX ADMIN — GABAPENTIN 600 MG: 300 CAPSULE ORAL at 21:01

## 2017-05-30 RX ADMIN — SODIUM CHLORIDE 50 ML/HR: 0.9 INJECTION, SOLUTION INTRAVENOUS at 05:46

## 2017-05-31 VITALS
SYSTOLIC BLOOD PRESSURE: 134 MMHG | DIASTOLIC BLOOD PRESSURE: 63 MMHG | RESPIRATION RATE: 20 BRPM | TEMPERATURE: 98.2 F | HEART RATE: 54 BPM | OXYGEN SATURATION: 96 % | BODY MASS INDEX: 29.1 KG/M2 | WEIGHT: 191.4 LBS

## 2017-05-31 PROBLEM — G43.909 MIGRAINE: Status: RESOLVED | Noted: 2017-05-28 | Resolved: 2017-05-31

## 2017-05-31 PROBLEM — R20.2 HEMIPARESTHESIA: Status: RESOLVED | Noted: 2017-05-28 | Resolved: 2017-05-31

## 2017-05-31 LAB
ANION GAP SERPL CALCULATED.3IONS-SCNC: 8 MMOL/L (ref 4–13)
BUN SERPL-MCNC: 16 MG/DL (ref 5–25)
CALCIUM SERPL-MCNC: 8.4 MG/DL (ref 8.3–10.1)
CHLORIDE SERPL-SCNC: 110 MMOL/L (ref 100–108)
CO2 SERPL-SCNC: 26 MMOL/L (ref 21–32)
CREAT SERPL-MCNC: 0.82 MG/DL (ref 0.6–1.3)
GFR SERPL CREATININE-BSD FRML MDRD: >60 ML/MIN/1.73SQ M
GLUCOSE SERPL-MCNC: 126 MG/DL (ref 65–140)
POTASSIUM SERPL-SCNC: 3.7 MMOL/L (ref 3.5–5.3)
SODIUM SERPL-SCNC: 144 MMOL/L (ref 136–145)

## 2017-05-31 PROCEDURE — A9270 NON-COVERED ITEM OR SERVICE: HCPCS | Performed by: PSYCHIATRY & NEUROLOGY

## 2017-05-31 PROCEDURE — A9270 NON-COVERED ITEM OR SERVICE: HCPCS | Performed by: PHYSICIAN ASSISTANT

## 2017-05-31 PROCEDURE — A9270 NON-COVERED ITEM OR SERVICE: HCPCS | Performed by: HOSPITALIST

## 2017-05-31 PROCEDURE — 80048 BASIC METABOLIC PNL TOTAL CA: CPT | Performed by: HOSPITALIST

## 2017-05-31 RX ORDER — METHYLPREDNISOLONE 4 MG/1
4 TABLET ORAL DAILY
Status: DISCONTINUED | OUTPATIENT
Start: 2017-06-05 | End: 2017-05-31 | Stop reason: HOSPADM

## 2017-05-31 RX ORDER — LOPERAMIDE HYDROCHLORIDE 2 MG/1
2 CAPSULE ORAL EVERY 4 HOURS PRN
Qty: 10 CAPSULE | Refills: 0 | Status: SHIPPED | OUTPATIENT
Start: 2017-05-31 | End: 2017-06-02

## 2017-05-31 RX ORDER — DICYCLOMINE HYDROCHLORIDE 10 MG/1
10 CAPSULE ORAL
Qty: 20 CAPSULE | Refills: 0 | Status: ON HOLD | OUTPATIENT
Start: 2017-05-31 | End: 2017-07-29 | Stop reason: ALTCHOICE

## 2017-05-31 RX ORDER — BUTALBITAL, ACETAMINOPHEN AND CAFFEINE 50; 325; 40 MG/1; MG/1; MG/1
1 TABLET ORAL EVERY 4 HOURS PRN
Qty: 20 TABLET | Refills: 0 | Status: SHIPPED | OUTPATIENT
Start: 2017-05-31 | End: 2017-07-29

## 2017-05-31 RX ORDER — METHYLPREDNISOLONE 4 MG/1
24 TABLET ORAL DAILY
Qty: 21 TABLET | Refills: 0 | Status: SHIPPED | OUTPATIENT
Start: 2017-05-31 | End: 2017-06-01

## 2017-05-31 RX ORDER — METHYLPREDNISOLONE 8 MG/1
24 TABLET ORAL DAILY
Qty: 21 TABLET | Refills: 0 | Status: SHIPPED | OUTPATIENT
Start: 2017-05-31 | End: 2017-05-31

## 2017-05-31 RX ORDER — METHYLPREDNISOLONE 16 MG/1
16 TABLET ORAL DAILY
Status: DISCONTINUED | OUTPATIENT
Start: 2017-06-02 | End: 2017-05-31 | Stop reason: HOSPADM

## 2017-05-31 RX ORDER — METHYLPREDNISOLONE 4 MG/1
8 TABLET ORAL DAILY
Status: DISCONTINUED | OUTPATIENT
Start: 2017-06-04 | End: 2017-05-31 | Stop reason: HOSPADM

## 2017-05-31 RX ORDER — METHYLPREDNISOLONE 4 MG/1
12 TABLET ORAL DAILY
Status: DISCONTINUED | OUTPATIENT
Start: 2017-06-03 | End: 2017-05-31 | Stop reason: HOSPADM

## 2017-05-31 RX ADMIN — DICYCLOMINE HYDROCHLORIDE 10 MG: 10 CAPSULE ORAL at 11:57

## 2017-05-31 RX ADMIN — ONDANSETRON 4 MG: 4 TABLET, ORALLY DISINTEGRATING ORAL at 12:48

## 2017-05-31 RX ADMIN — ENOXAPARIN SODIUM 40 MG: 40 INJECTION SUBCUTANEOUS at 08:53

## 2017-05-31 RX ADMIN — ASPIRIN 325 MG: 325 TABLET ORAL at 08:52

## 2017-05-31 RX ADMIN — BUTALBITAL, ACETAMINOPHEN, AND CAFFEINE 1 TABLET: 50; 325; 40 TABLET ORAL at 01:15

## 2017-05-31 RX ADMIN — BUTALBITAL, ACETAMINOPHEN, AND CAFFEINE 1 TABLET: 50; 325; 40 TABLET ORAL at 05:41

## 2017-05-31 RX ADMIN — METOCLOPRAMIDE 10 MG: 5 INJECTION, SOLUTION INTRAMUSCULAR; INTRAVENOUS at 04:22

## 2017-05-31 RX ADMIN — ZOLPIDEM TARTRATE 10 MG: 5 TABLET, FILM COATED ORAL at 01:15

## 2017-05-31 RX ADMIN — KETOROLAC TROMETHAMINE 30 MG: 30 INJECTION, SOLUTION INTRAMUSCULAR at 10:22

## 2017-05-31 RX ADMIN — HYDROCORTISONE 10 MG: 10 TABLET ORAL at 08:58

## 2017-05-31 RX ADMIN — DIPHENHYDRAMINE HYDROCHLORIDE 25 MG: 50 INJECTION, SOLUTION INTRAMUSCULAR; INTRAVENOUS at 10:18

## 2017-05-31 RX ADMIN — BUTALBITAL, ACETAMINOPHEN, AND CAFFEINE 1 TABLET: 50; 325; 40 TABLET ORAL at 10:32

## 2017-05-31 RX ADMIN — LAMOTRIGINE 200 MG: 100 TABLET ORAL at 08:52

## 2017-05-31 RX ADMIN — METHYLPREDNISOLONE 24 MG: 16 TABLET ORAL at 11:57

## 2017-05-31 RX ADMIN — DICYCLOMINE HYDROCHLORIDE 10 MG: 10 CAPSULE ORAL at 06:00

## 2017-05-31 RX ADMIN — LORAZEPAM 2 MG: 1 TABLET ORAL at 09:01

## 2017-05-31 RX ADMIN — DIPHENHYDRAMINE HYDROCHLORIDE 25 MG: 50 INJECTION, SOLUTION INTRAMUSCULAR; INTRAVENOUS at 04:20

## 2017-05-31 RX ADMIN — METOCLOPRAMIDE 10 MG: 5 INJECTION, SOLUTION INTRAMUSCULAR; INTRAVENOUS at 10:25

## 2017-05-31 RX ADMIN — KETOROLAC TROMETHAMINE 30 MG: 30 INJECTION, SOLUTION INTRAMUSCULAR at 04:20

## 2017-05-31 RX ADMIN — GABAPENTIN 600 MG: 300 CAPSULE ORAL at 08:52

## 2017-05-31 RX ADMIN — ARIPIPRAZOLE 10 MG: 10 TABLET ORAL at 08:58

## 2017-06-03 ENCOUNTER — APPOINTMENT (EMERGENCY)
Dept: RADIOLOGY | Facility: HOSPITAL | Age: 57
End: 2017-06-03
Payer: COMMERCIAL

## 2017-06-03 ENCOUNTER — HOSPITAL ENCOUNTER (EMERGENCY)
Facility: HOSPITAL | Age: 57
Discharge: HOME | End: 2017-06-03
Attending: EMERGENCY MEDICINE | Admitting: EMERGENCY MEDICINE
Payer: COMMERCIAL

## 2017-06-03 VITALS
TEMPERATURE: 99.2 F | RESPIRATION RATE: 16 BRPM | SYSTOLIC BLOOD PRESSURE: 123 MMHG | DIASTOLIC BLOOD PRESSURE: 63 MMHG | BODY MASS INDEX: 29.04 KG/M2 | OXYGEN SATURATION: 96 % | WEIGHT: 191 LBS | HEART RATE: 65 BPM

## 2017-06-03 DIAGNOSIS — G43.919 INTRACTABLE MIGRAINE: Primary | ICD-10-CM

## 2017-06-03 LAB
ANION GAP SERPL CALCULATED.3IONS-SCNC: 5 MMOL/L (ref 4–13)
BASOPHILS # BLD MANUAL: 0 THOUSAND/UL (ref 0–0.1)
BASOPHILS NFR MAR MANUAL: 0 % (ref 0–1)
BUN SERPL-MCNC: 14 MG/DL (ref 5–25)
CALCIUM SERPL-MCNC: 8.3 MG/DL (ref 8.3–10.1)
CHLORIDE SERPL-SCNC: 104 MMOL/L (ref 100–108)
CO2 SERPL-SCNC: 33 MMOL/L (ref 21–32)
CREAT SERPL-MCNC: 0.88 MG/DL (ref 0.6–1.3)
EOSINOPHIL # BLD MANUAL: 0.23 THOUSAND/UL (ref 0–0.4)
EOSINOPHIL NFR BLD MANUAL: 2 % (ref 0–6)
ERYTHROCYTE [DISTWIDTH] IN BLOOD BY AUTOMATED COUNT: 14.3 % (ref 11.6–15.1)
GFR SERPL CREATININE-BSD FRML MDRD: >60 ML/MIN/1.73SQ M
GLUCOSE SERPL-MCNC: 113 MG/DL (ref 65–140)
HCT VFR BLD AUTO: 37.8 % (ref 34.8–46.1)
HGB BLD-MCNC: 12.7 G/DL (ref 11.5–15.4)
LYMPHOCYTES # BLD AUTO: 2.68 THOUSAND/UL (ref 0.6–4.47)
LYMPHOCYTES # BLD AUTO: 23 % (ref 14–44)
MCH RBC QN AUTO: 32.6 PG (ref 26.8–34.3)
MCHC RBC AUTO-ENTMCNC: 33.6 G/DL (ref 31.4–37.4)
MCV RBC AUTO: 97 FL (ref 82–98)
MONOCYTES # BLD AUTO: 0.35 THOUSAND/UL (ref 0–1.22)
MONOCYTES NFR BLD: 3 % (ref 4–12)
NEUTROPHILS # BLD MANUAL: 8.4 THOUSAND/UL (ref 1.85–7.62)
NEUTS SEG NFR BLD AUTO: 72 % (ref 43–75)
NRBC BLD AUTO-RTO: 0 /100 WBCS
PLATELET # BLD AUTO: 307 THOUSANDS/UL (ref 149–390)
PLATELET BLD QL SMEAR: ADEQUATE
PMV BLD AUTO: 10.2 FL (ref 8.9–12.7)
POTASSIUM SERPL-SCNC: 3.5 MMOL/L (ref 3.5–5.3)
RBC # BLD AUTO: 3.89 MILLION/UL (ref 3.81–5.12)
RBC MORPH BLD: NORMAL
SODIUM SERPL-SCNC: 142 MMOL/L (ref 136–145)
TOTAL CELLS COUNTED SPEC: 100
WBC # BLD AUTO: 11.67 THOUSAND/UL (ref 4.31–10.16)

## 2017-06-03 PROCEDURE — 99284 EMERGENCY DEPT VISIT MOD MDM: CPT

## 2017-06-03 PROCEDURE — 96365 THER/PROPH/DIAG IV INF INIT: CPT

## 2017-06-03 PROCEDURE — A9270 NON-COVERED ITEM OR SERVICE: HCPCS | Performed by: PHYSICIAN ASSISTANT

## 2017-06-03 PROCEDURE — 73564 X-RAY EXAM KNEE 4 OR MORE: CPT

## 2017-06-03 PROCEDURE — 80048 BASIC METABOLIC PNL TOTAL CA: CPT | Performed by: PHYSICIAN ASSISTANT

## 2017-06-03 PROCEDURE — 36415 COLL VENOUS BLD VENIPUNCTURE: CPT | Performed by: PHYSICIAN ASSISTANT

## 2017-06-03 PROCEDURE — 96361 HYDRATE IV INFUSION ADD-ON: CPT

## 2017-06-03 PROCEDURE — 85027 COMPLETE CBC AUTOMATED: CPT | Performed by: PHYSICIAN ASSISTANT

## 2017-06-03 PROCEDURE — 96375 TX/PRO/DX INJ NEW DRUG ADDON: CPT

## 2017-06-03 PROCEDURE — 85007 BL SMEAR W/DIFF WBC COUNT: CPT | Performed by: PHYSICIAN ASSISTANT

## 2017-06-03 RX ORDER — MAGNESIUM SULFATE HEPTAHYDRATE 40 MG/ML
2 INJECTION, SOLUTION INTRAVENOUS ONCE
Status: COMPLETED | OUTPATIENT
Start: 2017-06-03 | End: 2017-06-03

## 2017-06-03 RX ORDER — METHYLPREDNISOLONE SODIUM SUCCINATE 40 MG/ML
40 INJECTION, POWDER, LYOPHILIZED, FOR SOLUTION INTRAMUSCULAR; INTRAVENOUS ONCE
Status: COMPLETED | OUTPATIENT
Start: 2017-06-03 | End: 2017-06-03

## 2017-06-03 RX ORDER — ACETAMINOPHEN 325 MG/1
650 TABLET ORAL ONCE
Status: COMPLETED | OUTPATIENT
Start: 2017-06-03 | End: 2017-06-03

## 2017-06-03 RX ORDER — LIDOCAINE HYDROCHLORIDE 40 MG/ML
5 SOLUTION TOPICAL ONCE
Status: COMPLETED | OUTPATIENT
Start: 2017-06-03 | End: 2017-06-03

## 2017-06-03 RX ORDER — DIPHENHYDRAMINE HYDROCHLORIDE 50 MG/ML
25 INJECTION INTRAMUSCULAR; INTRAVENOUS ONCE
Status: COMPLETED | OUTPATIENT
Start: 2017-06-03 | End: 2017-06-03

## 2017-06-03 RX ORDER — METOCLOPRAMIDE HYDROCHLORIDE 5 MG/ML
10 INJECTION INTRAMUSCULAR; INTRAVENOUS ONCE
Status: COMPLETED | OUTPATIENT
Start: 2017-06-03 | End: 2017-06-03

## 2017-06-03 RX ADMIN — MAGNESIUM SULFATE HEPTAHYDRATE 2 G: 40 INJECTION, SOLUTION INTRAVENOUS at 12:51

## 2017-06-03 RX ADMIN — LIDOCAINE HYDROCHLORIDE 5 ML: 40 SOLUTION TOPICAL at 13:31

## 2017-06-03 RX ADMIN — KETAMINE HYDROCHLORIDE: 50 INJECTION, SOLUTION INTRAMUSCULAR; INTRAVENOUS at 14:51

## 2017-06-03 RX ADMIN — SODIUM CHLORIDE 1000 ML: 0.9 INJECTION, SOLUTION INTRAVENOUS at 12:38

## 2017-06-03 RX ADMIN — ACETAMINOPHEN 650 MG: 325 TABLET, FILM COATED ORAL at 12:38

## 2017-06-03 RX ADMIN — DIPHENHYDRAMINE HYDROCHLORIDE 25 MG: 50 INJECTION, SOLUTION INTRAMUSCULAR; INTRAVENOUS at 12:38

## 2017-06-03 RX ADMIN — METHYLPREDNISOLONE SODIUM SUCCINATE 40 MG: 40 INJECTION, POWDER, FOR SOLUTION INTRAMUSCULAR; INTRAVENOUS at 12:38

## 2017-06-03 RX ADMIN — METOCLOPRAMIDE 10 MG: 5 INJECTION, SOLUTION INTRAMUSCULAR; INTRAVENOUS at 12:38

## 2017-06-04 ENCOUNTER — APPOINTMENT (EMERGENCY)
Dept: RADIOLOGY | Facility: HOSPITAL | Age: 57
End: 2017-06-04
Payer: COMMERCIAL

## 2017-06-04 ENCOUNTER — HOSPITAL ENCOUNTER (EMERGENCY)
Facility: HOSPITAL | Age: 57
Discharge: HOME | End: 2017-06-04
Attending: EMERGENCY MEDICINE
Payer: COMMERCIAL

## 2017-06-04 VITALS
WEIGHT: 211.2 LBS | TEMPERATURE: 98.2 F | RESPIRATION RATE: 16 BRPM | SYSTOLIC BLOOD PRESSURE: 129 MMHG | HEART RATE: 62 BPM | DIASTOLIC BLOOD PRESSURE: 69 MMHG | OXYGEN SATURATION: 94 % | BODY MASS INDEX: 32.11 KG/M2

## 2017-06-04 DIAGNOSIS — R07.89 ATYPICAL CHEST PAIN: Primary | ICD-10-CM

## 2017-06-04 DIAGNOSIS — R29.6 RECURRENT FALLS: ICD-10-CM

## 2017-06-04 LAB
ALBUMIN SERPL BCP-MCNC: 2.9 G/DL (ref 3.5–5)
ALP SERPL-CCNC: 82 U/L (ref 46–116)
ALT SERPL W P-5'-P-CCNC: 33 U/L (ref 12–78)
ANION GAP SERPL CALCULATED.3IONS-SCNC: 7 MMOL/L (ref 4–13)
AST SERPL W P-5'-P-CCNC: 25 U/L (ref 5–45)
BASOPHILS # BLD AUTO: 0.02 THOUSANDS/ΜL (ref 0–0.1)
BASOPHILS NFR BLD AUTO: 0 % (ref 0–1)
BILIRUB SERPL-MCNC: 0.27 MG/DL (ref 0.2–1)
BUN SERPL-MCNC: 17 MG/DL (ref 5–25)
CALCIUM SERPL-MCNC: 8.1 MG/DL (ref 8.3–10.1)
CHLORIDE SERPL-SCNC: 106 MMOL/L (ref 100–108)
CO2 SERPL-SCNC: 31 MMOL/L (ref 21–32)
CREAT SERPL-MCNC: 0.73 MG/DL (ref 0.6–1.3)
EOSINOPHIL # BLD AUTO: 0.02 THOUSAND/ΜL (ref 0–0.61)
EOSINOPHIL NFR BLD AUTO: 0 % (ref 0–6)
ERYTHROCYTE [DISTWIDTH] IN BLOOD BY AUTOMATED COUNT: 14.2 % (ref 11.6–15.1)
GFR SERPL CREATININE-BSD FRML MDRD: >60 ML/MIN/1.73SQ M
GLUCOSE SERPL-MCNC: 116 MG/DL (ref 65–140)
HCT VFR BLD AUTO: 37.1 % (ref 34.8–46.1)
HGB BLD-MCNC: 12.6 G/DL (ref 11.5–15.4)
LYMPHOCYTES # BLD AUTO: 2.1 THOUSANDS/ΜL (ref 0.6–4.47)
LYMPHOCYTES NFR BLD AUTO: 15 % (ref 14–44)
MCH RBC QN AUTO: 33.1 PG (ref 26.8–34.3)
MCHC RBC AUTO-ENTMCNC: 34 G/DL (ref 31.4–37.4)
MCV RBC AUTO: 97 FL (ref 82–98)
MONOCYTES # BLD AUTO: 1.12 THOUSAND/ΜL (ref 0.17–1.22)
MONOCYTES NFR BLD AUTO: 8 % (ref 4–12)
NEUTROPHILS # BLD AUTO: 11.16 THOUSANDS/ΜL (ref 1.85–7.62)
NEUTS SEG NFR BLD AUTO: 77 % (ref 43–75)
NRBC BLD AUTO-RTO: 0 /100 WBCS
PLATELET # BLD AUTO: 345 THOUSANDS/UL (ref 149–390)
PMV BLD AUTO: 10.6 FL (ref 8.9–12.7)
POTASSIUM SERPL-SCNC: 3.9 MMOL/L (ref 3.5–5.3)
PROT SERPL-MCNC: 6.7 G/DL (ref 6.4–8.2)
RBC # BLD AUTO: 3.81 MILLION/UL (ref 3.81–5.12)
SODIUM SERPL-SCNC: 144 MMOL/L (ref 136–145)
SPECIMEN SOURCE: NORMAL
SPECIMEN SOURCE: NORMAL
TROPONIN I BLD-MCNC: 0 NG/ML (ref 0–0.08)
TROPONIN I BLD-MCNC: 0 NG/ML (ref 0–0.08)
WBC # BLD AUTO: 14.42 THOUSAND/UL (ref 4.31–10.16)

## 2017-06-04 PROCEDURE — 93005 ELECTROCARDIOGRAM TRACING: CPT | Performed by: EMERGENCY MEDICINE

## 2017-06-04 PROCEDURE — A9270 NON-COVERED ITEM OR SERVICE: HCPCS | Performed by: EMERGENCY MEDICINE

## 2017-06-04 PROCEDURE — 80053 COMPREHEN METABOLIC PANEL: CPT | Performed by: EMERGENCY MEDICINE

## 2017-06-04 PROCEDURE — 84484 ASSAY OF TROPONIN QUANT: CPT

## 2017-06-04 PROCEDURE — 71020 HB CHEST X-RAY 2VW FRONTAL&LATL: CPT

## 2017-06-04 PROCEDURE — 93005 ELECTROCARDIOGRAM TRACING: CPT

## 2017-06-04 PROCEDURE — 36415 COLL VENOUS BLD VENIPUNCTURE: CPT | Performed by: EMERGENCY MEDICINE

## 2017-06-04 PROCEDURE — 99285 EMERGENCY DEPT VISIT HI MDM: CPT

## 2017-06-04 PROCEDURE — 85025 COMPLETE CBC W/AUTO DIFF WBC: CPT | Performed by: EMERGENCY MEDICINE

## 2017-06-04 RX ORDER — METHOCARBAMOL 500 MG/1
1000 TABLET, FILM COATED ORAL ONCE
Status: COMPLETED | OUTPATIENT
Start: 2017-06-04 | End: 2017-06-04

## 2017-06-04 RX ORDER — MAGNESIUM HYDROXIDE/ALUMINUM HYDROXICE/SIMETHICONE 120; 1200; 1200 MG/30ML; MG/30ML; MG/30ML
30 SUSPENSION ORAL ONCE
Status: COMPLETED | OUTPATIENT
Start: 2017-06-04 | End: 2017-06-04

## 2017-06-04 RX ORDER — METHOCARBAMOL 500 MG/1
1000 TABLET, FILM COATED ORAL 3 TIMES DAILY PRN
Qty: 20 TABLET | Refills: 0 | Status: ON HOLD | OUTPATIENT
Start: 2017-06-04 | End: 2017-07-29 | Stop reason: ALTCHOICE

## 2017-06-04 RX ORDER — FAMOTIDINE 20 MG/1
20 TABLET, FILM COATED ORAL ONCE
Status: COMPLETED | OUTPATIENT
Start: 2017-06-04 | End: 2017-06-04

## 2017-06-04 RX ORDER — ASPIRIN 81 MG/1
324 TABLET, CHEWABLE ORAL ONCE
Status: COMPLETED | OUTPATIENT
Start: 2017-06-04 | End: 2017-06-04

## 2017-06-04 RX ORDER — ONDANSETRON 4 MG/1
4 TABLET, ORALLY DISINTEGRATING ORAL ONCE
Status: COMPLETED | OUTPATIENT
Start: 2017-06-04 | End: 2017-06-04

## 2017-06-04 RX ADMIN — LIDOCAINE HYDROCHLORIDE 15 ML: 20 SOLUTION ORAL; TOPICAL at 14:57

## 2017-06-04 RX ADMIN — ALUMINUM HYDROXIDE, MAGNESIUM HYDROXIDE, AND SIMETHICONE 30 ML: 200; 200; 20 SUSPENSION ORAL at 14:57

## 2017-06-04 RX ADMIN — ONDANSETRON 4 MG: 4 TABLET, ORALLY DISINTEGRATING ORAL at 13:45

## 2017-06-04 RX ADMIN — FAMOTIDINE 20 MG: 20 TABLET ORAL at 14:57

## 2017-06-04 RX ADMIN — ASPIRIN 81 MG 324 MG: 81 TABLET ORAL at 13:09

## 2017-06-04 RX ADMIN — METHOCARBAMOL 1000 MG: 500 TABLET ORAL at 15:36

## 2017-06-05 LAB
ATRIAL RATE: 62 BPM
ATRIAL RATE: 75 BPM
P AXIS: 23 DEGREES
P AXIS: 30 DEGREES
PR INTERVAL: 144 MS
PR INTERVAL: 158 MS
QRS AXIS: -2 DEGREES
QRS AXIS: -5 DEGREES
QRSD INTERVAL: 82 MS
QRSD INTERVAL: 82 MS
QT INTERVAL: 360 MS
QT INTERVAL: 402 MS
QTC INTERVAL: 402 MS
QTC INTERVAL: 408 MS
T WAVE AXIS: 19 DEGREES
T WAVE AXIS: 7 DEGREES
VENTRICULAR RATE: 62 BPM
VENTRICULAR RATE: 75 BPM

## 2017-07-29 ENCOUNTER — GENERIC CONVERSION - ENCOUNTER (OUTPATIENT)
Dept: OTHER | Facility: OTHER | Age: 57
End: 2017-07-29

## 2017-07-29 ENCOUNTER — APPOINTMENT (EMERGENCY)
Dept: CT IMAGING | Facility: HOSPITAL | Age: 57
DRG: 312 | End: 2017-07-29
Payer: COMMERCIAL

## 2017-07-29 ENCOUNTER — APPOINTMENT (EMERGENCY)
Dept: RADIOLOGY | Facility: HOSPITAL | Age: 57
DRG: 312 | End: 2017-07-29
Payer: COMMERCIAL

## 2017-07-29 ENCOUNTER — HOSPITAL ENCOUNTER (INPATIENT)
Facility: HOSPITAL | Age: 57
LOS: 6 days | Discharge: HOME | DRG: 312 | End: 2017-08-04
Attending: EMERGENCY MEDICINE | Admitting: HOSPITALIST
Payer: COMMERCIAL

## 2017-07-29 DIAGNOSIS — R55 SYNCOPE AND COLLAPSE: ICD-10-CM

## 2017-07-29 DIAGNOSIS — R55 SYNCOPE: Primary | ICD-10-CM

## 2017-07-29 DIAGNOSIS — E87.6 HYPOKALEMIA: ICD-10-CM

## 2017-07-29 LAB
ALBUMIN SERPL BCP-MCNC: 3.5 G/DL (ref 3.5–5)
ALP SERPL-CCNC: 103 U/L (ref 46–116)
ALT SERPL W P-5'-P-CCNC: 26 U/L (ref 12–78)
ANION GAP SERPL CALCULATED.3IONS-SCNC: 6 MMOL/L (ref 4–13)
APTT PPP: 26 SECONDS (ref 23–35)
AST SERPL W P-5'-P-CCNC: 19 U/L (ref 5–45)
BASOPHILS # BLD AUTO: 0.05 THOUSANDS/ΜL (ref 0–0.1)
BASOPHILS NFR BLD AUTO: 0 % (ref 0–1)
BILIRUB SERPL-MCNC: 0.24 MG/DL (ref 0.2–1)
BILIRUB UR QL STRIP: NEGATIVE
BUN SERPL-MCNC: 19 MG/DL (ref 5–25)
CALCIUM SERPL-MCNC: 9.1 MG/DL (ref 8.3–10.1)
CHLORIDE SERPL-SCNC: 105 MMOL/L (ref 100–108)
CLARITY UR: CLEAR
CO2 SERPL-SCNC: 31 MMOL/L (ref 21–32)
COLOR UR: YELLOW
COLOR, POC: YELLOW
CREAT SERPL-MCNC: 1.01 MG/DL (ref 0.6–1.3)
EOSINOPHIL # BLD AUTO: 0.04 THOUSAND/ΜL (ref 0–0.61)
EOSINOPHIL NFR BLD AUTO: 0 % (ref 0–6)
ERYTHROCYTE [DISTWIDTH] IN BLOOD BY AUTOMATED COUNT: 13.3 % (ref 11.6–15.1)
GFR SERPL CREATININE-BSD FRML MDRD: 62 ML/MIN/1.73SQ M
GLUCOSE SERPL-MCNC: 100 MG/DL (ref 65–140)
GLUCOSE UR STRIP-MCNC: NEGATIVE MG/DL
HCT VFR BLD AUTO: 38.2 % (ref 34.8–46.1)
HGB BLD-MCNC: 13.3 G/DL (ref 11.5–15.4)
HGB UR QL STRIP.AUTO: NEGATIVE
INR PPP: 0.96 (ref 0.86–1.16)
KETONES UR STRIP-MCNC: ABNORMAL MG/DL
LEUKOCYTE ESTERASE UR QL STRIP: NEGATIVE
LYMPHOCYTES # BLD AUTO: 2.77 THOUSANDS/ΜL (ref 0.6–4.47)
LYMPHOCYTES NFR BLD AUTO: 23 % (ref 14–44)
MCH RBC QN AUTO: 31.7 PG (ref 26.8–34.3)
MCHC RBC AUTO-ENTMCNC: 34.8 G/DL (ref 31.4–37.4)
MCV RBC AUTO: 91 FL (ref 82–98)
MONOCYTES # BLD AUTO: 0.96 THOUSAND/ΜL (ref 0.17–1.22)
MONOCYTES NFR BLD AUTO: 8 % (ref 4–12)
NEUTROPHILS # BLD AUTO: 8.46 THOUSANDS/ΜL (ref 1.85–7.62)
NEUTS SEG NFR BLD AUTO: 69 % (ref 43–75)
NITRITE UR QL STRIP: NEGATIVE
NRBC BLD AUTO-RTO: 0 /100 WBCS
PH UR STRIP.AUTO: 5.5 [PH] (ref 4.5–8)
PLATELET # BLD AUTO: 350 THOUSANDS/UL (ref 149–390)
PMV BLD AUTO: 10.3 FL (ref 8.9–12.7)
POTASSIUM SERPL-SCNC: 2.9 MMOL/L (ref 3.5–5.3)
PROT SERPL-MCNC: 7.3 G/DL (ref 6.4–8.2)
PROT UR STRIP-MCNC: NEGATIVE MG/DL
PROTHROMBIN TIME: 12.8 SECONDS (ref 12.1–14.4)
RBC # BLD AUTO: 4.19 MILLION/UL (ref 3.81–5.12)
SODIUM SERPL-SCNC: 142 MMOL/L (ref 136–145)
SP GR UR STRIP.AUTO: 1.02 (ref 1–1.03)
SPECIMEN SOURCE: NORMAL
TROPONIN I BLD-MCNC: 0.01 NG/ML (ref 0–0.08)
UROBILINOGEN UR QL STRIP.AUTO: 0.2 E.U./DL
WBC # BLD AUTO: 12.28 THOUSAND/UL (ref 4.31–10.16)

## 2017-07-29 PROCEDURE — 71020 HB CHEST X-RAY 2VW FRONTAL&LATL: CPT

## 2017-07-29 PROCEDURE — 72125 CT NECK SPINE W/O DYE: CPT

## 2017-07-29 PROCEDURE — 85025 COMPLETE CBC W/AUTO DIFF WBC: CPT | Performed by: EMERGENCY MEDICINE

## 2017-07-29 PROCEDURE — 36415 COLL VENOUS BLD VENIPUNCTURE: CPT | Performed by: EMERGENCY MEDICINE

## 2017-07-29 PROCEDURE — 85730 THROMBOPLASTIN TIME PARTIAL: CPT | Performed by: EMERGENCY MEDICINE

## 2017-07-29 PROCEDURE — 96360 HYDRATION IV INFUSION INIT: CPT

## 2017-07-29 PROCEDURE — 99285 EMERGENCY DEPT VISIT HI MDM: CPT

## 2017-07-29 PROCEDURE — 70450 CT HEAD/BRAIN W/O DYE: CPT

## 2017-07-29 PROCEDURE — 93005 ELECTROCARDIOGRAM TRACING: CPT | Performed by: EMERGENCY MEDICINE

## 2017-07-29 PROCEDURE — 81003 URINALYSIS AUTO W/O SCOPE: CPT

## 2017-07-29 PROCEDURE — 81002 URINALYSIS NONAUTO W/O SCOPE: CPT | Performed by: EMERGENCY MEDICINE

## 2017-07-29 PROCEDURE — 85610 PROTHROMBIN TIME: CPT | Performed by: EMERGENCY MEDICINE

## 2017-07-29 PROCEDURE — 80053 COMPREHEN METABOLIC PANEL: CPT | Performed by: EMERGENCY MEDICINE

## 2017-07-29 PROCEDURE — 84484 ASSAY OF TROPONIN QUANT: CPT

## 2017-07-29 PROCEDURE — 72100 X-RAY EXAM L-S SPINE 2/3 VWS: CPT

## 2017-07-29 PROCEDURE — 72070 X-RAY EXAM THORAC SPINE 2VWS: CPT

## 2017-07-29 RX ORDER — DICYCLOMINE HYDROCHLORIDE 10 MG/1
10 CAPSULE ORAL
Status: DISCONTINUED | OUTPATIENT
Start: 2017-07-30 | End: 2017-08-04 | Stop reason: HOSPADM

## 2017-07-29 RX ORDER — ASPIRIN 325 MG
325 TABLET ORAL DAILY
Status: DISCONTINUED | OUTPATIENT
Start: 2017-07-30 | End: 2017-08-04 | Stop reason: HOSPADM

## 2017-07-29 RX ORDER — ZONISAMIDE 25 MG/1
50 CAPSULE ORAL DAILY
Status: DISCONTINUED | OUTPATIENT
Start: 2017-07-30 | End: 2017-08-04 | Stop reason: HOSPADM

## 2017-07-29 RX ORDER — METOPROLOL SUCCINATE 25 MG/1
25 TABLET, EXTENDED RELEASE ORAL DAILY
Status: DISCONTINUED | OUTPATIENT
Start: 2017-07-30 | End: 2017-07-30

## 2017-07-29 RX ORDER — LAMOTRIGINE 100 MG/1
200 TABLET ORAL DAILY
Status: DISCONTINUED | OUTPATIENT
Start: 2017-07-30 | End: 2017-08-04 | Stop reason: HOSPADM

## 2017-07-29 RX ORDER — FLUVOXAMINE MALEATE 100 MG
300 TABLET ORAL
COMMUNITY
End: 2019-10-20 | Stop reason: HOSPADM

## 2017-07-29 RX ORDER — FLUVOXAMINE MALEATE 50 MG/1
100 TABLET, COATED ORAL
Status: DISCONTINUED | OUTPATIENT
Start: 2017-07-29 | End: 2017-08-04 | Stop reason: HOSPADM

## 2017-07-29 RX ORDER — MORPHINE SULFATE 2 MG/ML
1 INJECTION, SOLUTION INTRAMUSCULAR; INTRAVENOUS EVERY 4 HOURS PRN
Status: DISCONTINUED | OUTPATIENT
Start: 2017-07-29 | End: 2017-07-30

## 2017-07-29 RX ORDER — POTASSIUM CHLORIDE 20 MEQ/1
40 TABLET, EXTENDED RELEASE ORAL ONCE
Status: COMPLETED | OUTPATIENT
Start: 2017-07-29 | End: 2017-07-29

## 2017-07-29 RX ORDER — POTASSIUM CHLORIDE AND SODIUM CHLORIDE 900; 300 MG/100ML; MG/100ML
75 INJECTION, SOLUTION INTRAVENOUS CONTINUOUS
Status: DISCONTINUED | OUTPATIENT
Start: 2017-07-29 | End: 2017-07-30

## 2017-07-29 RX ORDER — ONDANSETRON 2 MG/ML
4 INJECTION INTRAMUSCULAR; INTRAVENOUS EVERY 6 HOURS PRN
Status: DISCONTINUED | OUTPATIENT
Start: 2017-07-29 | End: 2017-08-04 | Stop reason: HOSPADM

## 2017-07-29 RX ORDER — LIDOCAINE 50 MG/G
1 PATCH TOPICAL DAILY
Status: DISCONTINUED | OUTPATIENT
Start: 2017-07-30 | End: 2017-08-04 | Stop reason: HOSPADM

## 2017-07-29 RX ORDER — LORAZEPAM 1 MG/1
2 TABLET ORAL EVERY 6 HOURS PRN
Status: DISCONTINUED | OUTPATIENT
Start: 2017-07-29 | End: 2017-08-02

## 2017-07-29 RX ORDER — FUROSEMIDE 20 MG/1
20 TABLET ORAL DAILY
COMMUNITY
End: 2017-08-04 | Stop reason: HOSPADM

## 2017-07-29 RX ORDER — GABAPENTIN 300 MG/1
600 CAPSULE ORAL 3 TIMES DAILY
Status: DISCONTINUED | OUTPATIENT
Start: 2017-07-29 | End: 2017-08-04 | Stop reason: HOSPADM

## 2017-07-29 RX ORDER — METHOCARBAMOL 500 MG/1
1000 TABLET, FILM COATED ORAL 3 TIMES DAILY PRN
Status: DISCONTINUED | OUTPATIENT
Start: 2017-07-29 | End: 2017-08-04 | Stop reason: HOSPADM

## 2017-07-29 RX ORDER — ZOLPIDEM TARTRATE 5 MG/1
10 TABLET ORAL
Status: DISCONTINUED | OUTPATIENT
Start: 2017-07-29 | End: 2017-07-30

## 2017-07-29 RX ORDER — BUTALBITAL, ACETAMINOPHEN AND CAFFEINE 50; 325; 40 MG/1; MG/1; MG/1
1 TABLET ORAL EVERY 4 HOURS PRN
Status: DISCONTINUED | OUTPATIENT
Start: 2017-07-29 | End: 2017-08-04 | Stop reason: HOSPADM

## 2017-07-29 RX ORDER — CALCIUM CARBONATE 200(500)MG
1000 TABLET,CHEWABLE ORAL DAILY PRN
Status: DISCONTINUED | OUTPATIENT
Start: 2017-07-29 | End: 2017-08-04 | Stop reason: HOSPADM

## 2017-07-29 RX ORDER — FUROSEMIDE 20 MG/1
20 TABLET ORAL DAILY
Status: DISCONTINUED | OUTPATIENT
Start: 2017-07-30 | End: 2017-07-31

## 2017-07-29 RX ORDER — HYDROCORTISONE 10 MG/1
10 TABLET ORAL 2 TIMES DAILY
Status: DISCONTINUED | OUTPATIENT
Start: 2017-07-29 | End: 2017-07-31

## 2017-07-29 RX ORDER — MORPHINE SULFATE 4 MG/ML
4 INJECTION, SOLUTION INTRAMUSCULAR; INTRAVENOUS ONCE
Status: COMPLETED | OUTPATIENT
Start: 2017-07-29 | End: 2017-07-29

## 2017-07-29 RX ORDER — HYDROCORTISONE 10 MG/1
10 TABLET ORAL 2 TIMES DAILY
Status: ON HOLD | COMMUNITY
End: 2017-08-04

## 2017-07-29 RX ORDER — ARIPIPRAZOLE 10 MG/1
10 TABLET ORAL DAILY
Status: DISCONTINUED | OUTPATIENT
Start: 2017-07-30 | End: 2017-08-01

## 2017-07-29 RX ORDER — TRAMADOL HYDROCHLORIDE 50 MG/1
50 TABLET ORAL EVERY 6 HOURS PRN
Status: DISCONTINUED | OUTPATIENT
Start: 2017-07-29 | End: 2017-08-04 | Stop reason: HOSPADM

## 2017-07-29 RX ADMIN — LORAZEPAM 2 MG: 1 TABLET ORAL at 21:03

## 2017-07-29 RX ADMIN — TRAMADOL HYDROCHLORIDE 50 MG: 50 TABLET, COATED ORAL at 19:36

## 2017-07-29 RX ADMIN — METHOCARBAMOL 1000 MG: 500 TABLET ORAL at 21:03

## 2017-07-29 RX ADMIN — HYDROCORTISONE 10 MG: 10 TABLET ORAL at 21:04

## 2017-07-29 RX ADMIN — FLUVOXAMINE MALEATE 100 MG: 50 TABLET, FILM COATED ORAL at 21:04

## 2017-07-29 RX ADMIN — POTASSIUM CHLORIDE AND SODIUM CHLORIDE 75 ML/HR: 900; 300 INJECTION, SOLUTION INTRAVENOUS at 21:04

## 2017-07-29 RX ADMIN — MORPHINE SULFATE 4 MG: 4 INJECTION, SOLUTION INTRAMUSCULAR; INTRAVENOUS at 18:17

## 2017-07-29 RX ADMIN — POTASSIUM CHLORIDE 40 MEQ: 1500 TABLET, EXTENDED RELEASE ORAL at 18:19

## 2017-07-29 RX ADMIN — SODIUM CHLORIDE 1000 ML: 0.9 INJECTION, SOLUTION INTRAVENOUS at 16:54

## 2017-07-29 RX ADMIN — GABAPENTIN 600 MG: 300 CAPSULE ORAL at 21:04

## 2017-07-29 RX ADMIN — MORPHINE SULFATE 1 MG: 2 INJECTION, SOLUTION INTRAMUSCULAR; INTRAVENOUS at 22:17

## 2017-07-30 LAB
ALBUMIN SERPL BCP-MCNC: 2.8 G/DL (ref 3.5–5)
ALP SERPL-CCNC: 94 U/L (ref 46–116)
ALT SERPL W P-5'-P-CCNC: 19 U/L (ref 12–78)
ANION GAP SERPL CALCULATED.3IONS-SCNC: 6 MMOL/L (ref 4–13)
AST SERPL W P-5'-P-CCNC: 14 U/L (ref 5–45)
ATRIAL RATE: 70 BPM
BILIRUB SERPL-MCNC: 0.24 MG/DL (ref 0.2–1)
BUN SERPL-MCNC: 17 MG/DL (ref 5–25)
CALCIUM SERPL-MCNC: 8.4 MG/DL (ref 8.3–10.1)
CHLORIDE SERPL-SCNC: 107 MMOL/L (ref 100–108)
CO2 SERPL-SCNC: 27 MMOL/L (ref 21–32)
CREAT SERPL-MCNC: 0.79 MG/DL (ref 0.6–1.3)
GFR SERPL CREATININE-BSD FRML MDRD: 83 ML/MIN/1.73SQ M
GLUCOSE SERPL-MCNC: 86 MG/DL (ref 65–140)
MAGNESIUM SERPL-MCNC: 2.1 MG/DL (ref 1.6–2.6)
P AXIS: 37 DEGREES
POTASSIUM SERPL-SCNC: 3.7 MMOL/L (ref 3.5–5.3)
PR INTERVAL: 158 MS
PROT SERPL-MCNC: 6 G/DL (ref 6.4–8.2)
QRS AXIS: -5 DEGREES
QRSD INTERVAL: 86 MS
QT INTERVAL: 390 MS
QTC INTERVAL: 421 MS
SODIUM SERPL-SCNC: 140 MMOL/L (ref 136–145)
T WAVE AXIS: 32 DEGREES
VENTRICULAR RATE: 70 BPM

## 2017-07-30 PROCEDURE — 83735 ASSAY OF MAGNESIUM: CPT | Performed by: HOSPITALIST

## 2017-07-30 PROCEDURE — G8979 MOBILITY GOAL STATUS: HCPCS

## 2017-07-30 PROCEDURE — 97163 PT EVAL HIGH COMPLEX 45 MIN: CPT

## 2017-07-30 PROCEDURE — 80053 COMPREHEN METABOLIC PANEL: CPT | Performed by: HOSPITALIST

## 2017-07-30 PROCEDURE — G8978 MOBILITY CURRENT STATUS: HCPCS

## 2017-07-30 RX ORDER — POTASSIUM CHLORIDE 20 MEQ/1
20 TABLET, EXTENDED RELEASE ORAL DAILY
Status: DISCONTINUED | OUTPATIENT
Start: 2017-07-30 | End: 2017-08-04 | Stop reason: HOSPADM

## 2017-07-30 RX ORDER — LANOLIN ALCOHOL/MO/W.PET/CERES
3 CREAM (GRAM) TOPICAL
Status: DISCONTINUED | OUTPATIENT
Start: 2017-07-30 | End: 2017-08-04 | Stop reason: HOSPADM

## 2017-07-30 RX ORDER — MORPHINE SULFATE 2 MG/ML
2 INJECTION, SOLUTION INTRAMUSCULAR; INTRAVENOUS EVERY 4 HOURS PRN
Status: DISCONTINUED | OUTPATIENT
Start: 2017-07-30 | End: 2017-07-31

## 2017-07-30 RX ADMIN — DICYCLOMINE HYDROCHLORIDE 10 MG: 10 CAPSULE ORAL at 15:12

## 2017-07-30 RX ADMIN — TRAMADOL HYDROCHLORIDE 50 MG: 50 TABLET, COATED ORAL at 20:23

## 2017-07-30 RX ADMIN — ASPIRIN 325 MG: 325 TABLET ORAL at 08:30

## 2017-07-30 RX ADMIN — MELATONIN TAB 3 MG 3 MG: 3 TAB at 21:20

## 2017-07-30 RX ADMIN — LORAZEPAM 2 MG: 1 TABLET ORAL at 21:20

## 2017-07-30 RX ADMIN — ENOXAPARIN SODIUM 40 MG: 30 INJECTION SUBCUTANEOUS at 08:30

## 2017-07-30 RX ADMIN — GABAPENTIN 600 MG: 300 CAPSULE ORAL at 21:20

## 2017-07-30 RX ADMIN — ZONISAMIDE 50 MG: 25 CAPSULE ORAL at 08:33

## 2017-07-30 RX ADMIN — CALCIUM CARBONATE 1000 MG: 500 TABLET, CHEWABLE ORAL at 12:44

## 2017-07-30 RX ADMIN — HYDROCORTISONE 10 MG: 10 TABLET ORAL at 17:07

## 2017-07-30 RX ADMIN — POTASSIUM CHLORIDE 20 MEQ: 1500 TABLET, EXTENDED RELEASE ORAL at 09:38

## 2017-07-30 RX ADMIN — FUROSEMIDE 20 MG: 20 TABLET ORAL at 08:31

## 2017-07-30 RX ADMIN — LORAZEPAM 2 MG: 1 TABLET ORAL at 09:37

## 2017-07-30 RX ADMIN — ARIPIPRAZOLE 10 MG: 10 TABLET ORAL at 08:30

## 2017-07-30 RX ADMIN — MORPHINE SULFATE 2 MG: 2 INJECTION, SOLUTION INTRAMUSCULAR; INTRAVENOUS at 21:30

## 2017-07-30 RX ADMIN — LAMOTRIGINE 200 MG: 100 TABLET ORAL at 08:31

## 2017-07-30 RX ADMIN — HYDROCORTISONE 10 MG: 10 TABLET ORAL at 08:31

## 2017-07-30 RX ADMIN — TRAMADOL HYDROCHLORIDE 50 MG: 50 TABLET, COATED ORAL at 08:45

## 2017-07-30 RX ADMIN — ONDANSETRON 4 MG: 2 INJECTION INTRAMUSCULAR; INTRAVENOUS at 15:11

## 2017-07-30 RX ADMIN — GABAPENTIN 600 MG: 300 CAPSULE ORAL at 15:12

## 2017-07-30 RX ADMIN — MORPHINE SULFATE 2 MG: 2 INJECTION, SOLUTION INTRAMUSCULAR; INTRAVENOUS at 04:16

## 2017-07-30 RX ADMIN — LORAZEPAM 2 MG: 1 TABLET ORAL at 03:03

## 2017-07-30 RX ADMIN — GABAPENTIN 600 MG: 300 CAPSULE ORAL at 08:31

## 2017-07-30 RX ADMIN — FLUVOXAMINE MALEATE 100 MG: 50 TABLET, FILM COATED ORAL at 21:20

## 2017-07-30 RX ADMIN — LIDOCAINE 1 PATCH: 50 PATCH CUTANEOUS at 08:32

## 2017-07-30 RX ADMIN — DICYCLOMINE HYDROCHLORIDE 10 MG: 10 CAPSULE ORAL at 11:36

## 2017-07-30 RX ADMIN — MORPHINE SULFATE 2 MG: 2 INJECTION, SOLUTION INTRAMUSCULAR; INTRAVENOUS at 00:15

## 2017-07-31 ENCOUNTER — APPOINTMENT (INPATIENT)
Dept: PHYSICAL THERAPY | Facility: HOSPITAL | Age: 57
DRG: 312 | End: 2017-07-31
Payer: COMMERCIAL

## 2017-07-31 LAB
ANION GAP SERPL CALCULATED.3IONS-SCNC: 4 MMOL/L (ref 4–13)
BUN SERPL-MCNC: 14 MG/DL (ref 5–25)
CALCIUM SERPL-MCNC: 8.4 MG/DL (ref 8.3–10.1)
CHLORIDE SERPL-SCNC: 108 MMOL/L (ref 100–108)
CO2 SERPL-SCNC: 28 MMOL/L (ref 21–32)
CREAT SERPL-MCNC: 0.82 MG/DL (ref 0.6–1.3)
GFR SERPL CREATININE-BSD FRML MDRD: 79 ML/MIN/1.73SQ M
GLUCOSE SERPL-MCNC: 82 MG/DL (ref 65–140)
POTASSIUM SERPL-SCNC: 3.8 MMOL/L (ref 3.5–5.3)
SODIUM SERPL-SCNC: 140 MMOL/L (ref 136–145)

## 2017-07-31 PROCEDURE — 97116 GAIT TRAINING THERAPY: CPT | Performed by: PHYSICAL THERAPIST

## 2017-07-31 PROCEDURE — 80048 BASIC METABOLIC PNL TOTAL CA: CPT | Performed by: HOSPITALIST

## 2017-07-31 PROCEDURE — 97530 THERAPEUTIC ACTIVITIES: CPT | Performed by: PHYSICAL THERAPIST

## 2017-07-31 RX ORDER — HYDROCORTISONE 10 MG/1
10 TABLET ORAL 3 TIMES DAILY
Status: DISCONTINUED | OUTPATIENT
Start: 2017-07-31 | End: 2017-08-04 | Stop reason: HOSPADM

## 2017-07-31 RX ORDER — POLYETHYLENE GLYCOL 3350 17 G/17G
17 POWDER, FOR SOLUTION ORAL DAILY PRN
Status: DISCONTINUED | OUTPATIENT
Start: 2017-07-31 | End: 2017-08-04 | Stop reason: HOSPADM

## 2017-07-31 RX ORDER — DOCUSATE SODIUM 100 MG/1
100 CAPSULE, LIQUID FILLED ORAL 2 TIMES DAILY
Status: DISCONTINUED | OUTPATIENT
Start: 2017-07-31 | End: 2017-08-04 | Stop reason: HOSPADM

## 2017-07-31 RX ORDER — MORPHINE SULFATE 2 MG/ML
1 INJECTION, SOLUTION INTRAMUSCULAR; INTRAVENOUS EVERY 6 HOURS PRN
Status: DISCONTINUED | OUTPATIENT
Start: 2017-07-31 | End: 2017-08-01 | Stop reason: CLARIF

## 2017-07-31 RX ADMIN — FUROSEMIDE 20 MG: 20 TABLET ORAL at 08:31

## 2017-07-31 RX ADMIN — POLYETHYLENE GLYCOL 3350 17 G: 17 POWDER, FOR SOLUTION ORAL at 16:12

## 2017-07-31 RX ADMIN — DICYCLOMINE HYDROCHLORIDE 10 MG: 10 CAPSULE ORAL at 16:13

## 2017-07-31 RX ADMIN — LIDOCAINE 1 PATCH: 50 PATCH CUTANEOUS at 08:32

## 2017-07-31 RX ADMIN — LORAZEPAM 2 MG: 1 TABLET ORAL at 16:16

## 2017-07-31 RX ADMIN — ASPIRIN 325 MG: 325 TABLET ORAL at 08:30

## 2017-07-31 RX ADMIN — GABAPENTIN 600 MG: 300 CAPSULE ORAL at 16:13

## 2017-07-31 RX ADMIN — MELATONIN TAB 3 MG 3 MG: 3 TAB at 21:11

## 2017-07-31 RX ADMIN — POTASSIUM CHLORIDE 20 MEQ: 1500 TABLET, EXTENDED RELEASE ORAL at 08:30

## 2017-07-31 RX ADMIN — ONDANSETRON 4 MG: 2 INJECTION INTRAMUSCULAR; INTRAVENOUS at 10:03

## 2017-07-31 RX ADMIN — LORAZEPAM 2 MG: 1 TABLET ORAL at 22:48

## 2017-07-31 RX ADMIN — METHOCARBAMOL 1000 MG: 500 TABLET ORAL at 11:20

## 2017-07-31 RX ADMIN — HYDROCORTISONE 10 MG: 10 TABLET ORAL at 16:14

## 2017-07-31 RX ADMIN — DICYCLOMINE HYDROCHLORIDE 10 MG: 10 CAPSULE ORAL at 06:11

## 2017-07-31 RX ADMIN — MORPHINE SULFATE 2 MG: 2 INJECTION, SOLUTION INTRAMUSCULAR; INTRAVENOUS at 06:10

## 2017-07-31 RX ADMIN — ENOXAPARIN SODIUM 40 MG: 30 INJECTION SUBCUTANEOUS at 08:31

## 2017-07-31 RX ADMIN — DOCUSATE SODIUM 100 MG: 100 CAPSULE, LIQUID FILLED ORAL at 12:40

## 2017-07-31 RX ADMIN — MORPHINE SULFATE 1 MG: 2 INJECTION, SOLUTION INTRAMUSCULAR; INTRAVENOUS at 20:18

## 2017-07-31 RX ADMIN — ZONISAMIDE 50 MG: 25 CAPSULE ORAL at 08:33

## 2017-07-31 RX ADMIN — LAMOTRIGINE 200 MG: 100 TABLET ORAL at 08:31

## 2017-07-31 RX ADMIN — DICYCLOMINE HYDROCHLORIDE 10 MG: 10 CAPSULE ORAL at 11:24

## 2017-07-31 RX ADMIN — MORPHINE SULFATE 1 MG: 2 INJECTION, SOLUTION INTRAMUSCULAR; INTRAVENOUS at 13:00

## 2017-07-31 RX ADMIN — DOCUSATE SODIUM 100 MG: 100 CAPSULE, LIQUID FILLED ORAL at 17:37

## 2017-07-31 RX ADMIN — ONDANSETRON 4 MG: 2 INJECTION INTRAMUSCULAR; INTRAVENOUS at 20:25

## 2017-07-31 RX ADMIN — GABAPENTIN 600 MG: 300 CAPSULE ORAL at 08:30

## 2017-07-31 RX ADMIN — FLUVOXAMINE MALEATE 100 MG: 50 TABLET, FILM COATED ORAL at 21:11

## 2017-07-31 RX ADMIN — HYDROCORTISONE 10 MG: 10 TABLET ORAL at 08:34

## 2017-07-31 RX ADMIN — HYDROCORTISONE 10 MG: 10 TABLET ORAL at 21:10

## 2017-07-31 RX ADMIN — ARIPIPRAZOLE 10 MG: 10 TABLET ORAL at 08:33

## 2017-07-31 RX ADMIN — GABAPENTIN 600 MG: 300 CAPSULE ORAL at 21:11

## 2017-08-01 RX ORDER — ARIPIPRAZOLE 5 MG/1
5 TABLET ORAL DAILY
Status: DISCONTINUED | OUTPATIENT
Start: 2017-08-02 | End: 2017-08-04 | Stop reason: HOSPADM

## 2017-08-01 RX ORDER — MORPHINE SULFATE 4 MG/ML
1 INJECTION, SOLUTION INTRAMUSCULAR; INTRAVENOUS EVERY 6 HOURS PRN
Status: DISCONTINUED | OUTPATIENT
Start: 2017-08-01 | End: 2017-08-01

## 2017-08-01 RX ADMIN — MORPHINE SULFATE 1 MG: 2 INJECTION, SOLUTION INTRAMUSCULAR; INTRAVENOUS at 02:41

## 2017-08-01 RX ADMIN — HYDROCORTISONE 10 MG: 10 TABLET ORAL at 21:08

## 2017-08-01 RX ADMIN — DOCUSATE SODIUM 100 MG: 100 CAPSULE, LIQUID FILLED ORAL at 09:28

## 2017-08-01 RX ADMIN — GABAPENTIN 600 MG: 300 CAPSULE ORAL at 09:27

## 2017-08-01 RX ADMIN — POTASSIUM CHLORIDE 20 MEQ: 1500 TABLET, EXTENDED RELEASE ORAL at 09:27

## 2017-08-01 RX ADMIN — HYDROCORTISONE 10 MG: 10 TABLET ORAL at 09:30

## 2017-08-01 RX ADMIN — DICYCLOMINE HYDROCHLORIDE 10 MG: 10 CAPSULE ORAL at 17:05

## 2017-08-01 RX ADMIN — TRAMADOL HYDROCHLORIDE 50 MG: 50 TABLET, COATED ORAL at 23:05

## 2017-08-01 RX ADMIN — ASPIRIN 325 MG: 325 TABLET ORAL at 09:28

## 2017-08-01 RX ADMIN — ONDANSETRON 4 MG: 2 INJECTION INTRAMUSCULAR; INTRAVENOUS at 02:50

## 2017-08-01 RX ADMIN — DICYCLOMINE HYDROCHLORIDE 10 MG: 10 CAPSULE ORAL at 11:49

## 2017-08-01 RX ADMIN — MELATONIN TAB 3 MG 3 MG: 3 TAB at 21:08

## 2017-08-01 RX ADMIN — ARIPIPRAZOLE 10 MG: 10 TABLET ORAL at 09:31

## 2017-08-01 RX ADMIN — GABAPENTIN 600 MG: 300 CAPSULE ORAL at 21:08

## 2017-08-01 RX ADMIN — LORAZEPAM 2 MG: 1 TABLET ORAL at 21:08

## 2017-08-01 RX ADMIN — FLUVOXAMINE MALEATE 100 MG: 50 TABLET, FILM COATED ORAL at 21:08

## 2017-08-01 RX ADMIN — LORAZEPAM 2 MG: 1 TABLET ORAL at 11:45

## 2017-08-01 RX ADMIN — ONDANSETRON 4 MG: 2 INJECTION INTRAMUSCULAR; INTRAVENOUS at 15:28

## 2017-08-01 RX ADMIN — METHOCARBAMOL 1000 MG: 500 TABLET ORAL at 13:35

## 2017-08-01 RX ADMIN — DOCUSATE SODIUM 100 MG: 100 CAPSULE, LIQUID FILLED ORAL at 17:05

## 2017-08-01 RX ADMIN — TRAMADOL HYDROCHLORIDE 50 MG: 50 TABLET, COATED ORAL at 15:24

## 2017-08-01 RX ADMIN — ONDANSETRON 4 MG: 2 INJECTION INTRAMUSCULAR; INTRAVENOUS at 23:05

## 2017-08-01 RX ADMIN — POLYETHYLENE GLYCOL 3350 17 G: 17 POWDER, FOR SOLUTION ORAL at 09:26

## 2017-08-01 RX ADMIN — ZONISAMIDE 50 MG: 25 CAPSULE ORAL at 09:32

## 2017-08-01 RX ADMIN — HYDROCORTISONE 10 MG: 10 TABLET ORAL at 17:05

## 2017-08-01 RX ADMIN — LIDOCAINE 1 PATCH: 50 PATCH CUTANEOUS at 09:29

## 2017-08-01 RX ADMIN — DICYCLOMINE HYDROCHLORIDE 10 MG: 10 CAPSULE ORAL at 06:13

## 2017-08-01 RX ADMIN — ENOXAPARIN SODIUM 40 MG: 30 INJECTION SUBCUTANEOUS at 09:29

## 2017-08-01 RX ADMIN — LAMOTRIGINE 200 MG: 100 TABLET ORAL at 09:28

## 2017-08-01 RX ADMIN — GABAPENTIN 600 MG: 300 CAPSULE ORAL at 17:05

## 2017-08-02 PROCEDURE — 97110 THERAPEUTIC EXERCISES: CPT

## 2017-08-02 RX ADMIN — HYDROCORTISONE 10 MG: 10 TABLET ORAL at 21:27

## 2017-08-02 RX ADMIN — POTASSIUM CHLORIDE 20 MEQ: 1500 TABLET, EXTENDED RELEASE ORAL at 08:25

## 2017-08-02 RX ADMIN — ASPIRIN 325 MG: 325 TABLET ORAL at 08:25

## 2017-08-02 RX ADMIN — DOCUSATE SODIUM 100 MG: 100 CAPSULE, LIQUID FILLED ORAL at 17:19

## 2017-08-02 RX ADMIN — TRAMADOL HYDROCHLORIDE 50 MG: 50 TABLET, COATED ORAL at 22:12

## 2017-08-02 RX ADMIN — LORAZEPAM 1.5 MG: 1 TABLET ORAL at 21:31

## 2017-08-02 RX ADMIN — GABAPENTIN 600 MG: 300 CAPSULE ORAL at 08:25

## 2017-08-02 RX ADMIN — DICYCLOMINE HYDROCHLORIDE 10 MG: 10 CAPSULE ORAL at 11:57

## 2017-08-02 RX ADMIN — HYDROCORTISONE 10 MG: 10 TABLET ORAL at 08:27

## 2017-08-02 RX ADMIN — LORAZEPAM 2 MG: 1 TABLET ORAL at 08:48

## 2017-08-02 RX ADMIN — DICYCLOMINE HYDROCHLORIDE 10 MG: 10 CAPSULE ORAL at 06:29

## 2017-08-02 RX ADMIN — LIDOCAINE 1 PATCH: 50 PATCH CUTANEOUS at 08:25

## 2017-08-02 RX ADMIN — ONDANSETRON 4 MG: 2 INJECTION INTRAMUSCULAR; INTRAVENOUS at 18:36

## 2017-08-02 RX ADMIN — TRAMADOL HYDROCHLORIDE 50 MG: 50 TABLET, COATED ORAL at 06:29

## 2017-08-02 RX ADMIN — DOCUSATE SODIUM 100 MG: 100 CAPSULE, LIQUID FILLED ORAL at 08:25

## 2017-08-02 RX ADMIN — MELATONIN TAB 3 MG 3 MG: 3 TAB at 21:25

## 2017-08-02 RX ADMIN — ONDANSETRON 4 MG: 2 INJECTION INTRAMUSCULAR; INTRAVENOUS at 06:29

## 2017-08-02 RX ADMIN — LORAZEPAM 1.5 MG: 1 TABLET ORAL at 15:29

## 2017-08-02 RX ADMIN — GABAPENTIN 600 MG: 300 CAPSULE ORAL at 21:25

## 2017-08-02 RX ADMIN — ARIPIPRAZOLE 5 MG: 5 TABLET ORAL at 08:26

## 2017-08-02 RX ADMIN — GABAPENTIN 600 MG: 300 CAPSULE ORAL at 15:28

## 2017-08-02 RX ADMIN — HYDROCORTISONE 10 MG: 10 TABLET ORAL at 15:31

## 2017-08-02 RX ADMIN — LAMOTRIGINE 200 MG: 100 TABLET ORAL at 08:25

## 2017-08-02 RX ADMIN — ENOXAPARIN SODIUM 40 MG: 30 INJECTION SUBCUTANEOUS at 08:24

## 2017-08-02 RX ADMIN — FLUVOXAMINE MALEATE 100 MG: 50 TABLET, FILM COATED ORAL at 21:26

## 2017-08-02 RX ADMIN — ZONISAMIDE 50 MG: 25 CAPSULE ORAL at 08:27

## 2017-08-02 RX ADMIN — DICYCLOMINE HYDROCHLORIDE 10 MG: 10 CAPSULE ORAL at 15:30

## 2017-08-03 ENCOUNTER — GENERIC CONVERSION - ENCOUNTER (OUTPATIENT)
Dept: OTHER | Facility: OTHER | Age: 57
End: 2017-08-03

## 2017-08-03 ENCOUNTER — APPOINTMENT (INPATIENT)
Dept: NON INVASIVE DIAGNOSTICS | Facility: HOSPITAL | Age: 57
DRG: 312 | End: 2017-08-03
Payer: COMMERCIAL

## 2017-08-03 LAB
ANION GAP SERPL CALCULATED.3IONS-SCNC: 6 MMOL/L (ref 4–13)
BUN SERPL-MCNC: 15 MG/DL (ref 5–25)
CALCIUM SERPL-MCNC: 8.6 MG/DL (ref 8.3–10.1)
CHLORIDE SERPL-SCNC: 108 MMOL/L (ref 100–108)
CO2 SERPL-SCNC: 27 MMOL/L (ref 21–32)
CREAT SERPL-MCNC: 0.82 MG/DL (ref 0.6–1.3)
ERYTHROCYTE [DISTWIDTH] IN BLOOD BY AUTOMATED COUNT: 13.6 % (ref 11.6–15.1)
GFR SERPL CREATININE-BSD FRML MDRD: 79 ML/MIN/1.73SQ M
GLUCOSE SERPL-MCNC: 81 MG/DL (ref 65–140)
HCT VFR BLD AUTO: 33.9 % (ref 34.8–46.1)
HGB BLD-MCNC: 11.1 G/DL (ref 11.5–15.4)
MCH RBC QN AUTO: 30.7 PG (ref 26.8–34.3)
MCHC RBC AUTO-ENTMCNC: 32.7 G/DL (ref 31.4–37.4)
MCV RBC AUTO: 94 FL (ref 82–98)
PLATELET # BLD AUTO: 276 THOUSANDS/UL (ref 149–390)
PMV BLD AUTO: 10.7 FL (ref 8.9–12.7)
POTASSIUM SERPL-SCNC: 4.1 MMOL/L (ref 3.5–5.3)
RBC # BLD AUTO: 3.62 MILLION/UL (ref 3.81–5.12)
SODIUM SERPL-SCNC: 141 MMOL/L (ref 136–145)
WBC # BLD AUTO: 8.96 THOUSAND/UL (ref 4.31–10.16)

## 2017-08-03 PROCEDURE — 85027 COMPLETE CBC AUTOMATED: CPT | Performed by: INTERNAL MEDICINE

## 2017-08-03 PROCEDURE — 97530 THERAPEUTIC ACTIVITIES: CPT | Performed by: PHYSICAL THERAPIST

## 2017-08-03 PROCEDURE — 80048 BASIC METABOLIC PNL TOTAL CA: CPT | Performed by: INTERNAL MEDICINE

## 2017-08-03 PROCEDURE — 93306 TTE W/DOPPLER COMPLETE: CPT

## 2017-08-03 PROCEDURE — 97116 GAIT TRAINING THERAPY: CPT | Performed by: PHYSICAL THERAPIST

## 2017-08-03 RX ADMIN — ZONISAMIDE 50 MG: 25 CAPSULE ORAL at 09:03

## 2017-08-03 RX ADMIN — ARIPIPRAZOLE 5 MG: 5 TABLET ORAL at 09:02

## 2017-08-03 RX ADMIN — GABAPENTIN 600 MG: 300 CAPSULE ORAL at 09:01

## 2017-08-03 RX ADMIN — HYDROCORTISONE 10 MG: 10 TABLET ORAL at 15:31

## 2017-08-03 RX ADMIN — HYDROCORTISONE 10 MG: 10 TABLET ORAL at 09:02

## 2017-08-03 RX ADMIN — LORAZEPAM 1.5 MG: 1 TABLET ORAL at 10:48

## 2017-08-03 RX ADMIN — ASPIRIN 325 MG: 325 TABLET ORAL at 09:01

## 2017-08-03 RX ADMIN — HYDROCORTISONE 10 MG: 10 TABLET ORAL at 21:11

## 2017-08-03 RX ADMIN — FLUVOXAMINE MALEATE 100 MG: 50 TABLET, FILM COATED ORAL at 21:11

## 2017-08-03 RX ADMIN — ONDANSETRON 4 MG: 2 INJECTION INTRAMUSCULAR; INTRAVENOUS at 12:09

## 2017-08-03 RX ADMIN — DICYCLOMINE HYDROCHLORIDE 10 MG: 10 CAPSULE ORAL at 06:09

## 2017-08-03 RX ADMIN — DICYCLOMINE HYDROCHLORIDE 10 MG: 10 CAPSULE ORAL at 15:31

## 2017-08-03 RX ADMIN — POTASSIUM CHLORIDE 20 MEQ: 1500 TABLET, EXTENDED RELEASE ORAL at 09:01

## 2017-08-03 RX ADMIN — LAMOTRIGINE 200 MG: 100 TABLET ORAL at 09:01

## 2017-08-03 RX ADMIN — ONDANSETRON 4 MG: 2 INJECTION INTRAMUSCULAR; INTRAVENOUS at 02:20

## 2017-08-03 RX ADMIN — DOCUSATE SODIUM 100 MG: 100 CAPSULE, LIQUID FILLED ORAL at 17:10

## 2017-08-03 RX ADMIN — MELATONIN TAB 3 MG 3 MG: 3 TAB at 21:10

## 2017-08-03 RX ADMIN — LORAZEPAM 1.5 MG: 1 TABLET ORAL at 21:10

## 2017-08-03 RX ADMIN — DOCUSATE SODIUM 100 MG: 100 CAPSULE, LIQUID FILLED ORAL at 09:01

## 2017-08-03 RX ADMIN — ONDANSETRON 4 MG: 2 INJECTION INTRAMUSCULAR; INTRAVENOUS at 20:05

## 2017-08-03 RX ADMIN — DICYCLOMINE HYDROCHLORIDE 10 MG: 10 CAPSULE ORAL at 10:48

## 2017-08-03 RX ADMIN — LORAZEPAM 1.5 MG: 1 TABLET ORAL at 04:03

## 2017-08-03 RX ADMIN — GABAPENTIN 600 MG: 300 CAPSULE ORAL at 15:31

## 2017-08-03 RX ADMIN — POLYETHYLENE GLYCOL 3350 17 G: 17 POWDER, FOR SOLUTION ORAL at 16:59

## 2017-08-03 RX ADMIN — TRAMADOL HYDROCHLORIDE 50 MG: 50 TABLET, COATED ORAL at 21:10

## 2017-08-03 RX ADMIN — GABAPENTIN 600 MG: 300 CAPSULE ORAL at 21:10

## 2017-08-03 RX ADMIN — ENOXAPARIN SODIUM 40 MG: 30 INJECTION SUBCUTANEOUS at 09:01

## 2017-08-03 RX ADMIN — LIDOCAINE 1 PATCH: 50 PATCH CUTANEOUS at 09:01

## 2017-08-04 VITALS
RESPIRATION RATE: 18 BRPM | BODY MASS INDEX: 31.01 KG/M2 | HEIGHT: 68 IN | WEIGHT: 204.59 LBS | OXYGEN SATURATION: 96 % | SYSTOLIC BLOOD PRESSURE: 99 MMHG | TEMPERATURE: 97.5 F | HEART RATE: 72 BPM | DIASTOLIC BLOOD PRESSURE: 56 MMHG

## 2017-08-04 PROBLEM — R55 SYNCOPE AND COLLAPSE: Status: RESOLVED | Noted: 2017-07-29 | Resolved: 2017-08-04

## 2017-08-04 LAB
ANION GAP SERPL CALCULATED.3IONS-SCNC: 5 MMOL/L (ref 4–13)
BUN SERPL-MCNC: 14 MG/DL (ref 5–25)
CALCIUM SERPL-MCNC: 8.8 MG/DL (ref 8.3–10.1)
CHLORIDE SERPL-SCNC: 108 MMOL/L (ref 100–108)
CO2 SERPL-SCNC: 28 MMOL/L (ref 21–32)
CREAT SERPL-MCNC: 0.81 MG/DL (ref 0.6–1.3)
GFR SERPL CREATININE-BSD FRML MDRD: 80 ML/MIN/1.73SQ M
GLUCOSE SERPL-MCNC: 86 MG/DL (ref 65–140)
POTASSIUM SERPL-SCNC: 4.1 MMOL/L (ref 3.5–5.3)
SODIUM SERPL-SCNC: 141 MMOL/L (ref 136–145)

## 2017-08-04 PROCEDURE — 80048 BASIC METABOLIC PNL TOTAL CA: CPT | Performed by: INTERNAL MEDICINE

## 2017-08-04 RX ORDER — LIDOCAINE 50 MG/G
1 PATCH TOPICAL DAILY
Qty: 30 PATCH | Refills: 3 | Status: SHIPPED | OUTPATIENT
Start: 2017-08-04 | End: 2017-10-08 | Stop reason: ALTCHOICE

## 2017-08-04 RX ORDER — ARIPIPRAZOLE 5 MG/1
5 TABLET ORAL DAILY
Qty: 30 TABLET | Refills: 3 | Status: ON HOLD | OUTPATIENT
Start: 2017-08-04 | End: 2019-10-07

## 2017-08-04 RX ORDER — METHOCARBAMOL 500 MG/1
1000 TABLET, FILM COATED ORAL 3 TIMES DAILY PRN
Refills: 0
Start: 2017-08-04 | End: 2019-05-08

## 2017-08-04 RX ORDER — LORAZEPAM 2 MG/1
1 TABLET ORAL EVERY 6 HOURS PRN
Qty: 30 TABLET | Refills: 0
Start: 2017-08-04 | End: 2018-01-04

## 2017-08-04 RX ORDER — DICYCLOMINE HYDROCHLORIDE 10 MG/1
10 CAPSULE ORAL
Qty: 90 CAPSULE | Refills: 1 | Status: SHIPPED | OUTPATIENT
Start: 2017-08-04 | End: 2017-10-08 | Stop reason: ALTCHOICE

## 2017-08-04 RX ORDER — ZONISAMIDE 50 MG/1
50 CAPSULE ORAL DAILY
Qty: 30 CAPSULE | Refills: 3 | Status: ON HOLD
Start: 2017-08-04 | End: 2019-10-07

## 2017-08-04 RX ORDER — TRAMADOL HYDROCHLORIDE 50 MG/1
50 TABLET ORAL EVERY 6 HOURS PRN
Qty: 30 TABLET | Refills: 0 | Status: CANCELLED
Start: 2017-08-04 | End: 2017-08-14

## 2017-08-04 RX ORDER — HYDROCORTISONE 10 MG/1
10 TABLET ORAL 3 TIMES DAILY
Qty: 90 TABLET | Refills: 3 | Status: SHIPPED | OUTPATIENT
Start: 2017-08-04 | End: 2019-04-05 | Stop reason: ALTCHOICE

## 2017-08-04 RX ADMIN — DOCUSATE SODIUM 100 MG: 100 CAPSULE, LIQUID FILLED ORAL at 08:07

## 2017-08-04 RX ADMIN — LAMOTRIGINE 200 MG: 100 TABLET ORAL at 08:07

## 2017-08-04 RX ADMIN — GABAPENTIN 600 MG: 300 CAPSULE ORAL at 08:07

## 2017-08-04 RX ADMIN — ONDANSETRON 4 MG: 2 INJECTION INTRAMUSCULAR; INTRAVENOUS at 15:39

## 2017-08-04 RX ADMIN — LIDOCAINE 1 PATCH: 50 PATCH CUTANEOUS at 08:07

## 2017-08-04 RX ADMIN — ONDANSETRON 4 MG: 2 INJECTION INTRAMUSCULAR; INTRAVENOUS at 03:27

## 2017-08-04 RX ADMIN — LORAZEPAM 1.5 MG: 1 TABLET ORAL at 04:24

## 2017-08-04 RX ADMIN — HYDROCORTISONE 10 MG: 10 TABLET ORAL at 08:07

## 2017-08-04 RX ADMIN — ZONISAMIDE 50 MG: 25 CAPSULE ORAL at 08:07

## 2017-08-04 RX ADMIN — ASPIRIN 325 MG: 325 TABLET ORAL at 08:07

## 2017-08-04 RX ADMIN — POTASSIUM CHLORIDE 20 MEQ: 1500 TABLET, EXTENDED RELEASE ORAL at 08:07

## 2017-08-04 RX ADMIN — DICYCLOMINE HYDROCHLORIDE 10 MG: 10 CAPSULE ORAL at 06:05

## 2017-08-04 RX ADMIN — ONDANSETRON 4 MG: 2 INJECTION INTRAMUSCULAR; INTRAVENOUS at 09:29

## 2017-08-04 RX ADMIN — DICYCLOMINE HYDROCHLORIDE 10 MG: 10 CAPSULE ORAL at 12:36

## 2017-08-04 RX ADMIN — ENOXAPARIN SODIUM 40 MG: 30 INJECTION SUBCUTANEOUS at 08:07

## 2017-08-04 RX ADMIN — ARIPIPRAZOLE 5 MG: 5 TABLET ORAL at 08:06

## 2017-08-04 RX ADMIN — LORAZEPAM 1.5 MG: 1 TABLET ORAL at 10:49

## 2017-08-09 ENCOUNTER — APPOINTMENT (EMERGENCY)
Dept: CT IMAGING | Facility: HOSPITAL | Age: 57
End: 2017-08-09
Payer: COMMERCIAL

## 2017-08-09 ENCOUNTER — GENERIC CONVERSION - ENCOUNTER (OUTPATIENT)
Dept: OTHER | Facility: OTHER | Age: 57
End: 2017-08-09

## 2017-08-09 ENCOUNTER — APPOINTMENT (EMERGENCY)
Dept: RADIOLOGY | Facility: HOSPITAL | Age: 57
End: 2017-08-09
Payer: COMMERCIAL

## 2017-08-09 ENCOUNTER — HOSPITAL ENCOUNTER (EMERGENCY)
Facility: HOSPITAL | Age: 57
Discharge: HOME | End: 2017-08-09
Attending: EMERGENCY MEDICINE
Payer: COMMERCIAL

## 2017-08-09 VITALS
HEART RATE: 77 BPM | SYSTOLIC BLOOD PRESSURE: 102 MMHG | WEIGHT: 212.9 LBS | DIASTOLIC BLOOD PRESSURE: 53 MMHG | BODY MASS INDEX: 32.37 KG/M2 | TEMPERATURE: 98.3 F | OXYGEN SATURATION: 97 % | RESPIRATION RATE: 18 BRPM

## 2017-08-09 DIAGNOSIS — R55 SYNCOPE: Primary | ICD-10-CM

## 2017-08-09 DIAGNOSIS — R42 VERTIGO: ICD-10-CM

## 2017-08-09 LAB
ALBUMIN SERPL BCP-MCNC: 3.4 G/DL (ref 3.5–5)
ALP SERPL-CCNC: 103 U/L (ref 46–116)
ALT SERPL W P-5'-P-CCNC: 25 U/L (ref 12–78)
ANION GAP SERPL CALCULATED.3IONS-SCNC: 12 MMOL/L (ref 4–13)
AST SERPL W P-5'-P-CCNC: 14 U/L (ref 5–45)
ATRIAL RATE: 87 BPM
BASOPHILS # BLD AUTO: 0.05 THOUSANDS/ΜL (ref 0–0.1)
BASOPHILS NFR BLD AUTO: 1 % (ref 0–1)
BILIRUB SERPL-MCNC: 0.22 MG/DL (ref 0.2–1)
BUN SERPL-MCNC: 18 MG/DL (ref 5–25)
CALCIUM SERPL-MCNC: 8.9 MG/DL (ref 8.3–10.1)
CHLORIDE SERPL-SCNC: 108 MMOL/L (ref 100–108)
CO2 SERPL-SCNC: 25 MMOL/L (ref 21–32)
CREAT SERPL-MCNC: 1.12 MG/DL (ref 0.6–1.3)
EOSINOPHIL # BLD AUTO: 0.11 THOUSAND/ΜL (ref 0–0.61)
EOSINOPHIL NFR BLD AUTO: 1 % (ref 0–6)
ERYTHROCYTE [DISTWIDTH] IN BLOOD BY AUTOMATED COUNT: 13.4 % (ref 11.6–15.1)
GFR SERPL CREATININE-BSD FRML MDRD: 54 ML/MIN/1.73SQ M
GLUCOSE SERPL-MCNC: 129 MG/DL (ref 65–140)
HCT VFR BLD AUTO: 39.3 % (ref 34.8–46.1)
HGB BLD-MCNC: 13.4 G/DL (ref 11.5–15.4)
LYMPHOCYTES # BLD AUTO: 1.22 THOUSANDS/ΜL (ref 0.6–4.47)
LYMPHOCYTES NFR BLD AUTO: 15 % (ref 14–44)
MCH RBC QN AUTO: 31.5 PG (ref 26.8–34.3)
MCHC RBC AUTO-ENTMCNC: 34.1 G/DL (ref 31.4–37.4)
MCV RBC AUTO: 92 FL (ref 82–98)
MONOCYTES # BLD AUTO: 0.64 THOUSAND/ΜL (ref 0.17–1.22)
MONOCYTES NFR BLD AUTO: 8 % (ref 4–12)
NEUTROPHILS # BLD AUTO: 6.25 THOUSANDS/ΜL (ref 1.85–7.62)
NEUTS SEG NFR BLD AUTO: 75 % (ref 43–75)
NRBC BLD AUTO-RTO: 0 /100 WBCS
P AXIS: 22 DEGREES
PLATELET # BLD AUTO: 327 THOUSANDS/UL (ref 149–390)
PMV BLD AUTO: 10.5 FL (ref 8.9–12.7)
POTASSIUM SERPL-SCNC: 3.3 MMOL/L (ref 3.5–5.3)
PR INTERVAL: 156 MS
PROT SERPL-MCNC: 7.2 G/DL (ref 6.4–8.2)
QRS AXIS: -24 DEGREES
QRSD INTERVAL: 92 MS
QT INTERVAL: 356 MS
QTC INTERVAL: 428 MS
RBC # BLD AUTO: 4.26 MILLION/UL (ref 3.81–5.12)
SODIUM SERPL-SCNC: 145 MMOL/L (ref 136–145)
T WAVE AXIS: 27 DEGREES
VENTRICULAR RATE: 87 BPM
WBC # BLD AUTO: 8.27 THOUSAND/UL (ref 4.31–10.16)

## 2017-08-09 PROCEDURE — 72125 CT NECK SPINE W/O DYE: CPT

## 2017-08-09 PROCEDURE — 70450 CT HEAD/BRAIN W/O DYE: CPT

## 2017-08-09 PROCEDURE — 93005 ELECTROCARDIOGRAM TRACING: CPT | Performed by: EMERGENCY MEDICINE

## 2017-08-09 PROCEDURE — 96374 THER/PROPH/DIAG INJ IV PUSH: CPT

## 2017-08-09 PROCEDURE — 72170 X-RAY EXAM OF PELVIS: CPT

## 2017-08-09 PROCEDURE — 85025 COMPLETE CBC W/AUTO DIFF WBC: CPT | Performed by: EMERGENCY MEDICINE

## 2017-08-09 PROCEDURE — 99285 EMERGENCY DEPT VISIT HI MDM: CPT

## 2017-08-09 PROCEDURE — 80053 COMPREHEN METABOLIC PANEL: CPT | Performed by: EMERGENCY MEDICINE

## 2017-08-09 PROCEDURE — 36415 COLL VENOUS BLD VENIPUNCTURE: CPT | Performed by: EMERGENCY MEDICINE

## 2017-08-09 RX ORDER — MECLIZINE HCL 12.5 MG/1
25 TABLET ORAL ONCE
Status: COMPLETED | OUTPATIENT
Start: 2017-08-09 | End: 2017-08-09

## 2017-08-09 RX ORDER — ONDANSETRON 2 MG/ML
4 INJECTION INTRAMUSCULAR; INTRAVENOUS ONCE
Status: COMPLETED | OUTPATIENT
Start: 2017-08-09 | End: 2017-08-09

## 2017-08-09 RX ORDER — ACETAMINOPHEN 325 MG/1
650 TABLET ORAL ONCE
Status: COMPLETED | OUTPATIENT
Start: 2017-08-09 | End: 2017-08-09

## 2017-08-09 RX ORDER — MECLIZINE HYDROCHLORIDE 25 MG/1
25 TABLET ORAL 3 TIMES DAILY PRN
Qty: 15 TABLET | Refills: 0 | Status: SHIPPED | OUTPATIENT
Start: 2017-08-09 | End: 2017-12-21

## 2017-08-09 RX ADMIN — ONDANSETRON 4 MG: 2 INJECTION INTRAMUSCULAR; INTRAVENOUS at 09:56

## 2017-08-09 RX ADMIN — ACETAMINOPHEN 650 MG: 325 TABLET, FILM COATED ORAL at 09:55

## 2017-08-09 RX ADMIN — MECLIZINE HCL 25 MG: 12.5 TABLET ORAL at 12:18

## 2017-09-07 ENCOUNTER — ALLSCRIPTS OFFICE VISIT (OUTPATIENT)
Dept: OTHER | Facility: OTHER | Age: 57
End: 2017-09-07

## 2017-10-08 ENCOUNTER — HOSPITAL ENCOUNTER (INPATIENT)
Facility: HOSPITAL | Age: 57
LOS: 3 days | Discharge: HOME | DRG: 103 | End: 2017-10-11
Attending: EMERGENCY MEDICINE | Admitting: INTERNAL MEDICINE
Payer: COMMERCIAL

## 2017-10-08 ENCOUNTER — APPOINTMENT (EMERGENCY)
Dept: CT IMAGING | Facility: HOSPITAL | Age: 57
DRG: 103 | End: 2017-10-08
Payer: COMMERCIAL

## 2017-10-08 DIAGNOSIS — G43.909 MIGRAINE: Primary | ICD-10-CM

## 2017-10-08 DIAGNOSIS — Z86.73 HISTORY OF TIAS: ICD-10-CM

## 2017-10-08 LAB
ALBUMIN SERPL BCP-MCNC: 3.5 G/DL (ref 3.5–5)
ALP SERPL-CCNC: 92 U/L (ref 46–116)
ALT SERPL W P-5'-P-CCNC: 22 U/L (ref 12–78)
AMPHETAMINES SERPL QL SCN: NEGATIVE
ANION GAP SERPL CALCULATED.3IONS-SCNC: 7 MMOL/L (ref 4–13)
APTT PPP: 26 SECONDS (ref 23–35)
AST SERPL W P-5'-P-CCNC: 21 U/L (ref 5–45)
ATRIAL RATE: 56 BPM
BACTERIA UR QL AUTO: ABNORMAL /HPF
BARBITURATES UR QL: NEGATIVE
BASOPHILS # BLD AUTO: 0.06 THOUSANDS/ΜL (ref 0–0.1)
BASOPHILS NFR BLD AUTO: 1 % (ref 0–1)
BENZODIAZ UR QL: NEGATIVE
BILIRUB SERPL-MCNC: 0.24 MG/DL (ref 0.2–1)
BILIRUB UR QL STRIP: NEGATIVE
BUN SERPL-MCNC: 12 MG/DL (ref 5–25)
CALCIUM SERPL-MCNC: 9.1 MG/DL (ref 8.3–10.1)
CHLORIDE SERPL-SCNC: 107 MMOL/L (ref 100–108)
CLARITY UR: CLEAR
CO2 SERPL-SCNC: 29 MMOL/L (ref 21–32)
COCAINE UR QL: NEGATIVE
COLOR UR: YELLOW
CREAT SERPL-MCNC: 0.93 MG/DL (ref 0.6–1.3)
EOSINOPHIL # BLD AUTO: 0.19 THOUSAND/ΜL (ref 0–0.61)
EOSINOPHIL NFR BLD AUTO: 3 % (ref 0–6)
ERYTHROCYTE [DISTWIDTH] IN BLOOD BY AUTOMATED COUNT: 15.6 % (ref 11.6–15.1)
GFR SERPL CREATININE-BSD FRML MDRD: 68 ML/MIN/1.73SQ M
GLUCOSE SERPL-MCNC: 110 MG/DL (ref 65–140)
GLUCOSE UR STRIP-MCNC: NEGATIVE MG/DL
HCT VFR BLD AUTO: 37.4 % (ref 34.8–46.1)
HGB BLD-MCNC: 12.4 G/DL (ref 11.5–15.4)
HGB UR QL STRIP.AUTO: NEGATIVE
INR PPP: 0.91 (ref 0.86–1.16)
KETONES UR STRIP-MCNC: NEGATIVE MG/DL
LEUKOCYTE ESTERASE UR QL STRIP: ABNORMAL
LYMPHOCYTES # BLD AUTO: 1.53 THOUSANDS/ΜL (ref 0.6–4.47)
LYMPHOCYTES NFR BLD AUTO: 20 % (ref 14–44)
MAGNESIUM SERPL-MCNC: 2 MG/DL (ref 1.6–2.6)
MCH RBC QN AUTO: 30.2 PG (ref 26.8–34.3)
MCHC RBC AUTO-ENTMCNC: 33.2 G/DL (ref 31.4–37.4)
MCV RBC AUTO: 91 FL (ref 82–98)
METHADONE UR QL: NEGATIVE
MONOCYTES # BLD AUTO: 0.57 THOUSAND/ΜL (ref 0.17–1.22)
MONOCYTES NFR BLD AUTO: 8 % (ref 4–12)
NEUTROPHILS # BLD AUTO: 5.22 THOUSANDS/ΜL (ref 1.85–7.62)
NEUTS SEG NFR BLD AUTO: 68 % (ref 43–75)
NITRITE UR QL STRIP: NEGATIVE
NON-SQ EPI CELLS URNS QL MICRO: ABNORMAL /HPF
NRBC BLD AUTO-RTO: 0 /100 WBCS
OPIATES UR QL SCN: NEGATIVE
P AXIS: 26 DEGREES
PCP UR QL: NEGATIVE
PH UR STRIP.AUTO: 5 [PH] (ref 4.5–8)
PLATELET # BLD AUTO: 256 THOUSANDS/UL (ref 149–390)
PMV BLD AUTO: 10.5 FL (ref 8.9–12.7)
POTASSIUM SERPL-SCNC: 3.3 MMOL/L (ref 3.5–5.3)
PR INTERVAL: 154 MS
PROT SERPL-MCNC: 7.1 G/DL (ref 6.4–8.2)
PROT UR STRIP-MCNC: NEGATIVE MG/DL
PROTHROMBIN TIME: 12.2 SECONDS (ref 12.1–14.4)
QRS AXIS: -15 DEGREES
QRSD INTERVAL: 84 MS
QT INTERVAL: 426 MS
QTC INTERVAL: 411 MS
RBC # BLD AUTO: 4.1 MILLION/UL (ref 3.81–5.12)
RBC #/AREA URNS AUTO: ABNORMAL /HPF
SODIUM SERPL-SCNC: 143 MMOL/L (ref 136–145)
SP GR UR STRIP.AUTO: <=1.005 (ref 1–1.03)
T WAVE AXIS: 27 DEGREES
THC UR QL: NEGATIVE
UROBILINOGEN UR QL STRIP.AUTO: 0.2 E.U./DL
VENTRICULAR RATE: 56 BPM
WBC # BLD AUTO: 7.57 THOUSAND/UL (ref 4.31–10.16)
WBC #/AREA URNS AUTO: ABNORMAL /HPF

## 2017-10-08 PROCEDURE — 85730 THROMBOPLASTIN TIME PARTIAL: CPT | Performed by: EMERGENCY MEDICINE

## 2017-10-08 PROCEDURE — 85610 PROTHROMBIN TIME: CPT | Performed by: EMERGENCY MEDICINE

## 2017-10-08 PROCEDURE — 93005 ELECTROCARDIOGRAM TRACING: CPT | Performed by: EMERGENCY MEDICINE

## 2017-10-08 PROCEDURE — 80053 COMPREHEN METABOLIC PANEL: CPT | Performed by: EMERGENCY MEDICINE

## 2017-10-08 PROCEDURE — 99285 EMERGENCY DEPT VISIT HI MDM: CPT

## 2017-10-08 PROCEDURE — 70450 CT HEAD/BRAIN W/O DYE: CPT

## 2017-10-08 PROCEDURE — 36415 COLL VENOUS BLD VENIPUNCTURE: CPT | Performed by: EMERGENCY MEDICINE

## 2017-10-08 PROCEDURE — 96375 TX/PRO/DX INJ NEW DRUG ADDON: CPT

## 2017-10-08 PROCEDURE — 96365 THER/PROPH/DIAG IV INF INIT: CPT

## 2017-10-08 PROCEDURE — 81001 URINALYSIS AUTO W/SCOPE: CPT

## 2017-10-08 PROCEDURE — 80307 DRUG TEST PRSMV CHEM ANLYZR: CPT | Performed by: EMERGENCY MEDICINE

## 2017-10-08 PROCEDURE — 85025 COMPLETE CBC W/AUTO DIFF WBC: CPT | Performed by: EMERGENCY MEDICINE

## 2017-10-08 PROCEDURE — 96361 HYDRATE IV INFUSION ADD-ON: CPT

## 2017-10-08 PROCEDURE — 81002 URINALYSIS NONAUTO W/O SCOPE: CPT | Performed by: EMERGENCY MEDICINE

## 2017-10-08 PROCEDURE — 83735 ASSAY OF MAGNESIUM: CPT | Performed by: EMERGENCY MEDICINE

## 2017-10-08 RX ORDER — LAMOTRIGINE 100 MG/1
200 TABLET ORAL DAILY
Status: DISCONTINUED | OUTPATIENT
Start: 2017-10-08 | End: 2017-10-11 | Stop reason: HOSPADM

## 2017-10-08 RX ORDER — HEPARIN SODIUM 5000 [USP'U]/ML
5000 INJECTION, SOLUTION INTRAVENOUS; SUBCUTANEOUS EVERY 8 HOURS SCHEDULED
Status: DISCONTINUED | OUTPATIENT
Start: 2017-10-08 | End: 2017-10-11 | Stop reason: HOSPADM

## 2017-10-08 RX ORDER — ZONISAMIDE 25 MG/1
50 CAPSULE ORAL DAILY
Status: DISCONTINUED | OUTPATIENT
Start: 2017-10-08 | End: 2017-10-11 | Stop reason: HOSPADM

## 2017-10-08 RX ORDER — LORAZEPAM 0.5 MG/1
0.5 TABLET ORAL EVERY 8 HOURS PRN
Status: DISCONTINUED | OUTPATIENT
Start: 2017-10-08 | End: 2017-10-11 | Stop reason: HOSPADM

## 2017-10-08 RX ORDER — LORAZEPAM 0.5 MG/1
0.5 TABLET ORAL EVERY 6 HOURS PRN
Status: DISCONTINUED | OUTPATIENT
Start: 2017-10-08 | End: 2017-10-08

## 2017-10-08 RX ORDER — DIPHENHYDRAMINE HYDROCHLORIDE 50 MG/ML
25 INJECTION INTRAMUSCULAR; INTRAVENOUS ONCE
Status: COMPLETED | OUTPATIENT
Start: 2017-10-08 | End: 2017-10-08

## 2017-10-08 RX ORDER — MAGNESIUM SULFATE HEPTAHYDRATE 40 MG/ML
2 INJECTION, SOLUTION INTRAVENOUS ONCE
Status: COMPLETED | OUTPATIENT
Start: 2017-10-08 | End: 2017-10-08

## 2017-10-08 RX ORDER — FUROSEMIDE 20 MG/1
20 TABLET ORAL DAILY
COMMUNITY
End: 2018-01-04

## 2017-10-08 RX ORDER — ATORVASTATIN CALCIUM 40 MG/1
40 TABLET, FILM COATED ORAL EVERY EVENING
Status: DISCONTINUED | OUTPATIENT
Start: 2017-10-08 | End: 2017-10-11 | Stop reason: HOSPADM

## 2017-10-08 RX ORDER — DIHYDROERGOTAMINE MESYLATE 1 MG/ML
0.5 INJECTION, SOLUTION INTRAMUSCULAR; INTRAVENOUS; SUBCUTANEOUS ONCE
Status: DISCONTINUED | OUTPATIENT
Start: 2017-10-08 | End: 2017-10-08 | Stop reason: CLARIF

## 2017-10-08 RX ORDER — LORAZEPAM 1 MG/1
1 TABLET ORAL 2 TIMES DAILY PRN
Status: DISCONTINUED | OUTPATIENT
Start: 2017-10-08 | End: 2017-10-08

## 2017-10-08 RX ORDER — ARIPIPRAZOLE 10 MG/1
10 TABLET ORAL DAILY
Status: DISCONTINUED | OUTPATIENT
Start: 2017-10-08 | End: 2017-10-11 | Stop reason: HOSPADM

## 2017-10-08 RX ORDER — LORAZEPAM 1 MG/1
2 TABLET ORAL
Status: DISCONTINUED | OUTPATIENT
Start: 2017-10-08 | End: 2017-10-11 | Stop reason: HOSPADM

## 2017-10-08 RX ORDER — ACETAMINOPHEN, ASPIRIN AND CAFFEINE 250; 250; 65 MG/1; MG/1; MG/1
1 TABLET, FILM COATED ORAL EVERY 6 HOURS PRN
COMMUNITY
End: 2017-10-11 | Stop reason: HOSPADM

## 2017-10-08 RX ORDER — HYDROCORTISONE 10 MG/1
20 TABLET ORAL 2 TIMES DAILY
Status: DISCONTINUED | OUTPATIENT
Start: 2017-10-08 | End: 2017-10-11 | Stop reason: HOSPADM

## 2017-10-08 RX ORDER — LORAZEPAM 1 MG/1
1 TABLET ORAL EVERY 6 HOURS PRN
Status: DISCONTINUED | OUTPATIENT
Start: 2017-10-08 | End: 2017-10-08 | Stop reason: DRUGHIGH

## 2017-10-08 RX ORDER — METOCLOPRAMIDE HYDROCHLORIDE 5 MG/ML
10 INJECTION INTRAMUSCULAR; INTRAVENOUS ONCE
Status: COMPLETED | OUTPATIENT
Start: 2017-10-08 | End: 2017-10-08

## 2017-10-08 RX ORDER — FLUVOXAMINE MALEATE 50 MG/1
300 TABLET, COATED ORAL
Status: DISCONTINUED | OUTPATIENT
Start: 2017-10-08 | End: 2017-10-11 | Stop reason: HOSPADM

## 2017-10-08 RX ORDER — ONDANSETRON 2 MG/ML
4 INJECTION INTRAMUSCULAR; INTRAVENOUS EVERY 8 HOURS
Status: DISCONTINUED | OUTPATIENT
Start: 2017-10-08 | End: 2017-10-11 | Stop reason: HOSPADM

## 2017-10-08 RX ORDER — ONDANSETRON 2 MG/ML
4 INJECTION INTRAMUSCULAR; INTRAVENOUS EVERY 8 HOURS PRN
Status: DISCONTINUED | OUTPATIENT
Start: 2017-10-08 | End: 2017-10-11 | Stop reason: HOSPADM

## 2017-10-08 RX ORDER — GABAPENTIN 300 MG/1
600 CAPSULE ORAL 3 TIMES DAILY
Status: DISCONTINUED | OUTPATIENT
Start: 2017-10-08 | End: 2017-10-11 | Stop reason: HOSPADM

## 2017-10-08 RX ORDER — DIHYDROERGOTAMINE MESYLATE 1 MG/ML
1 INJECTION, SOLUTION INTRAMUSCULAR; INTRAVENOUS; SUBCUTANEOUS EVERY 8 HOURS
Status: DISCONTINUED | OUTPATIENT
Start: 2017-10-08 | End: 2017-10-08 | Stop reason: CLARIF

## 2017-10-08 RX ORDER — METHOCARBAMOL 500 MG/1
1000 TABLET, FILM COATED ORAL 3 TIMES DAILY PRN
Status: DISCONTINUED | OUTPATIENT
Start: 2017-10-08 | End: 2017-10-11 | Stop reason: HOSPADM

## 2017-10-08 RX ORDER — ASPIRIN 325 MG
325 TABLET ORAL DAILY
Status: DISCONTINUED | OUTPATIENT
Start: 2017-10-08 | End: 2017-10-11 | Stop reason: HOSPADM

## 2017-10-08 RX ORDER — FUROSEMIDE 20 MG/1
20 TABLET ORAL DAILY
Status: DISCONTINUED | OUTPATIENT
Start: 2017-10-08 | End: 2017-10-08

## 2017-10-08 RX ORDER — METHYLPREDNISOLONE SODIUM SUCCINATE 125 MG/2ML
500 INJECTION, POWDER, LYOPHILIZED, FOR SOLUTION INTRAMUSCULAR; INTRAVENOUS ONCE
Status: COMPLETED | OUTPATIENT
Start: 2017-10-08 | End: 2017-10-08

## 2017-10-08 RX ADMIN — METHOCARBAMOL 1000 MG: 500 TABLET ORAL at 12:46

## 2017-10-08 RX ADMIN — SODIUM CHLORIDE 1000 ML: 0.9 INJECTION, SOLUTION INTRAVENOUS at 07:52

## 2017-10-08 RX ADMIN — ASPIRIN 325 MG: 325 TABLET ORAL at 14:59

## 2017-10-08 RX ADMIN — HEPARIN SODIUM 5000 UNITS: 5000 INJECTION, SOLUTION INTRAVENOUS; SUBCUTANEOUS at 14:59

## 2017-10-08 RX ADMIN — LORAZEPAM 2 MG: 1 TABLET ORAL at 22:05

## 2017-10-08 RX ADMIN — METOCLOPRAMIDE 10 MG: 5 INJECTION, SOLUTION INTRAMUSCULAR; INTRAVENOUS at 07:54

## 2017-10-08 RX ADMIN — METHYLPREDNISOLONE SODIUM SUCCINATE 500 MG: 125 INJECTION, POWDER, FOR SOLUTION INTRAMUSCULAR; INTRAVENOUS at 08:00

## 2017-10-08 RX ADMIN — GABAPENTIN 600 MG: 300 CAPSULE ORAL at 15:00

## 2017-10-08 RX ADMIN — DIHYDROERGOTAMINE MESYLATE: 1 INJECTION, SOLUTION INTRAMUSCULAR; INTRAVENOUS; SUBCUTANEOUS at 19:51

## 2017-10-08 RX ADMIN — HEPARIN SODIUM 5000 UNITS: 5000 INJECTION, SOLUTION INTRAVENOUS; SUBCUTANEOUS at 21:07

## 2017-10-08 RX ADMIN — DIHYDROERGOTAMINE MESYLATE: 1 INJECTION, SOLUTION INTRAMUSCULAR; INTRAVENOUS; SUBCUTANEOUS at 21:12

## 2017-10-08 RX ADMIN — MAGNESIUM SULFATE HEPTAHYDRATE 2 G: 40 INJECTION, SOLUTION INTRAVENOUS at 08:06

## 2017-10-08 RX ADMIN — ZONISAMIDE 50 MG: 25 CAPSULE ORAL at 14:59

## 2017-10-08 RX ADMIN — HYDROCORTISONE 20 MG: 10 TABLET ORAL at 18:31

## 2017-10-08 RX ADMIN — DIPHENHYDRAMINE HYDROCHLORIDE 25 MG: 50 INJECTION, SOLUTION INTRAMUSCULAR; INTRAVENOUS at 07:53

## 2017-10-08 RX ADMIN — LORAZEPAM 0.5 MG: 0.5 TABLET ORAL at 16:41

## 2017-10-08 RX ADMIN — VALPROATE SODIUM 1000 MG: 100 INJECTION, SOLUTION INTRAVENOUS at 10:26

## 2017-10-08 RX ADMIN — ONDANSETRON 4 MG: 2 INJECTION INTRAMUSCULAR; INTRAVENOUS at 19:31

## 2017-10-08 RX ADMIN — GABAPENTIN 600 MG: 300 CAPSULE ORAL at 21:07

## 2017-10-08 RX ADMIN — ONDANSETRON 4 MG: 2 INJECTION INTRAMUSCULAR; INTRAVENOUS at 17:25

## 2017-10-08 RX ADMIN — FLUVOXAMINE MALEATE 300 MG: 50 TABLET, FILM COATED ORAL at 22:02

## 2017-10-08 RX ADMIN — METHOCARBAMOL 1000 MG: 500 TABLET ORAL at 23:39

## 2017-10-08 NOTE — H&P
History and Physical - [facility]    Patient Information: Patrick Olivera 60 y o  female MRN: [id number]  Unit/Bed#: E2 MS 77088 Encounter: [id number]  Admitting Physician: Steve Fuller PA-C  PCP: Melissa Cowart MD  Date of Admission:  10/08/17    Assessment/Plan:    Hospital Problem List:     Principal Problem:    Migraine  Active Problems:    Bipolar depression ([facility] )    Adrenal insufficiency ([place]'s disease) ([facility] )    Fibromyalgia    Status post gastrectomy    History of TIAs      Primary Problem(s):  · Intractable migraine  · Generalized with gradual onset, associated photophobia and phonophobia, blurry vision, right sided numbness  · CT of head without contrast reveals no acute intracranial abnormality, mass, midline shift, hemorrhage or infarction  · Follows with Neurology at [facility] THE [facility] and takes zonagran 50 mg daily for migraine prophylaxis  However thinks this is not working  · Reports last migraine being about 2 months ago  · Received Reglan 10 mg, Benadryl 25 mg, magnesium sulfate 2 g, Solu-Medrol 500 mg, and Depakote 1000 mg in ED  · Urine drug screen negative  · Consult Neurology for further recommendations and input  Additional Problems:   · Right facial numbness: In the setting of a complex migraine however must still rule out stroke  Patient already on aspirin 325 mg daily but did not take today  Will provide here  Will obtain lipid panel and place on statin in the meantime  Obtain hemoglobin A1c   CT of head with no acute intracranial abnormality  Will defer further imaging such as MRI and carotid ultrasound to Neurology  Neuro checks and monitor on telemetry    · Hypokalemia:  Potassium 3 3  In the of daily Lasix use  Will replete and continue to monitor    · Bipolar depression:  Maintained on outpatient regimen of Luvox 100 mg daily, lamotrigine 200 mg daily, Abilify 5 mg daily    According to the patient, Abilify was increased to 10 mg by her psychiatrist recently  · Chronic pain: On robaxin and gabapentin  · 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 in May of 2017, patient was taken off her Lasix  However the she notes being restarted on the by her PCP given increase in edema in her legs  She takes 20 mg daily  Will hold at this time  VTE Prophylaxis: Heparin  / sequential compression device   Code Status: full code  Anticipated Length of Stay:  Patient will be admitted on an Inpatient basis with an anticipated length of stay of  Greater than 2 midnights  Justification for Hospital Stay: intractable migraine     Chief Complaint:   Headache x 3 days    History of Present Illness:    Patrick Olivera is a 60 y o  female with a PMH of bipolar disorder, major depression, 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, fibromyalgia, and history of gastrectomy 2015  She presents with a generalized headache that started three days ago around noon with gradual onset  Patient describes her headache as generalized involving her entire head and with sharp, gnawing, aching pain  She is sensitive to light and sound  Admits to blurry vision to both eyes  Associated nausea and vomiting at home x6  Headache has been constant despite trialing Tylenol and Excedrin at home  It has progressively worsened since onset which prompted patient to seek medical attention  The this is typical of her normal migraines however describes it as the worst one ever  Last migraine was approximately 2 months ago  Last saw her neurologist back in June of 2017 at [facility]  She was placed on Zonegran 50 mg daily at bedtime  She also has associated right-sided numbness to her entire body including her face, upper extremity and lower extremity  Denies any tingling anywhere    She does have a history of TIAs a few years ago but unable to recall details of what she experienced at that time  Denies any slurred speech, facial droop, confusion, weakness to one extremity greater than the other  Does not smoke, drink or use other drugs  Denies any chest pain or pressure, palpitations, abdominal pain, fevers or chills, dysuria, diarrhea or constipation  Review of Systems:    General:   No Fever or chills;   EENT:   No ear pain, facial swelling; No sneezing, sore throat  Skin:   No rashes, color changes  Respiratory:     No shortness of breath, cough, wheezing,    Cardiovascular:     No chest pain, palpitations  Gastrointestinal:    No nausea, vomiting, diarrhea; No abdominal pain  Musculoskeletal:     No myalgias, swelling  Neurologic:   No dizziness, + numbness, weakness  +headache     No speech difficulties  Psych:   No agitation, suicidal ideations  Otherwise, All other twelve-point review of systems normal      Past Medical and Surgical History:     Past Medical History:   Diagnosis Date    [facility]     Bipolar disorder     Cervical radiculopathy     Chronic back pain     [facility] ([facility] ) 1991    Fibromyalgia     History of TIAs     cannot remember details    Hypertension     Hypokalemia     Migraine     Psychiatric disorder     Anxiety, major depression, bipolar    Spinal stenosis     Syncope 2014    orthostatic hypotension       Past Surgical History:   Procedure Laterality Date    APPENDECTOMY      GASTRIC RESTRICTION SURGERY      Gastric Sleeve Nov 2015    HYSTERECTOMY      JOINT REPLACEMENT      Left Knee 2011 and Right Hip 2010       Meds/Allergies:    No current facility-administered medications for this encounter          Allergies   Allergen Reactions    Ketorolac Itching and Other (See Comments)     [toradol] Itching, hives    Risperidone      Other reaction(s): Unknown Reaction    Temazepam Hives       Allergies: Allergies   Allergen Reactions    Ketorolac Itching and Other (See Comments)     [toradol] Itching, hives    Risperidone      Other reaction(s): Unknown Reaction    Temazepam Hives       Social History:     Marital Status: /Civil Union     Substance Use History:   History   Alcohol Use No     History   Smoking Status    Former Smoker   Smokeless Tobacco    Never Used     History   Drug Use No       Family History:    non-contributory    Physical Exam:     Vitals:   Blood Pressure: 100/55 (10/08/17 1212)  Pulse: 68 (10/08/17 1212)  Temperature: 98 °F (36 7 °C) (10/08/17 1212)  Temp Source: Oral (10/08/17 1212)  Respirations: 16 (10/08/17 1212)  Height: 5' 8" (172 7 cm) (10/08/17 1212)  Weight - Scale: 96 3 kg (212 lb 4 9 oz) (10/08/17 1212)  SpO2: 95 % (10/08/17 1212)    Physical Exam   Constitutional: She is oriented to person, place, and time  She appears well-developed and well-nourished  No distress  HENT:   Head: Normocephalic and atraumatic  Eyes: Conjunctivae are normal    Cardiovascular: Normal rate, regular rhythm and normal heart sounds  Pulmonary/Chest: Effort normal and breath sounds normal  No respiratory distress  She has no wheezes  She has no rales  She exhibits no tenderness  Abdominal: Soft  Bowel sounds are normal  She exhibits no distension and no mass  There is no tenderness  There is no rebound and no guarding  Neurological: She is alert and oriented to person, place, and time  Sensory deficit present to right side upper extremity and lower extremity  Right-sided pronator drift downwards  Equal motor strength to upper lower extremities  Fine motor coordination skills-finger to thumb and heel-to-shin intact  Pupils equal round reactive to light however pupil larger to right eye  Skin: Skin is warm and dry  She is not diaphoretic  Psychiatric: She has a normal mood and affect   Her behavior is normal  Judgment and thought content normal  Nursing note and vitals reviewed  Additional Data:     Lab Results: I have personally reviewed pertinent reports  Results from last 7 days  Lab Units 10/08/17  0740   WBC Thousand/uL 7 57   HEMOGLOBIN g/dL 12 4   HEMATOCRIT % 37 4   PLATELETS Thousands/uL 256   NEUTROS PCT % 68   LYMPHS PCT % 20   MONOS PCT % 8   EOS PCT % 3       Results from last 7 days  Lab Units 10/08/17  0740   SODIUM mmol/L 143   POTASSIUM mmol/L 3 3*   CHLORIDE mmol/L 107   CO2 mmol/L 29   BUN mg/dL 12   CREATININE mg/dL 0 93   CALCIUM mg/dL 9 1   TOTAL PROTEIN g/dL 7 1   BILIRUBIN TOTAL mg/dL 0 24   ALK PHOS U/L 92   ALT U/L 22   AST U/L 21   GLUCOSE RANDOM mg/dL 110       Results from last 7 days  Lab Units 10/08/17  0740   INR  0 91       Imaging: I have personally reviewed pertinent reports  Ct Head Without Contrast    Result Date: 10/8/2017  Narrative: CT BRAIN - WITHOUT CONTRAST INDICATION:  Headache, right-sided numbness COMPARISON:  CT 2017, MR 2017 TECHNIQUE:  CT examination of the brain was performed  In addition to axial images, coronal reformatted images were created and submitted for interpretation  Radiation dose length product (DLP) for this visit:  1047 mGy-cm   This examination, like all CT scans performed in the [facility], was performed utilizing techniques to minimize radiation dose exposure, including the use of iterative reconstruction and automated exposure control  IMAGE QUALITY:  Diagnostic  FINDINGS:  PARENCHYMA:  No intracranial mass, mass effect or midline shift  No CT signs of acute infarction  There is no parenchymal hemorrhage  VENTRICLES AND EXTRA-AXIAL SPACES:  Normal for patient's age  VISUALIZED ORBITS AND PARANASAL SINUSES:  Unremarkable  CALVARIUM AND EXTRACRANIAL SOFT TISSUES:   Normal      Impression: No acute intracranial abnormality   Workstation performed: [id number]       EKG, Pathology, and Other Studies Reviewed on Admission:   · EK symptoms bradycardia  /411    Allscripts Records Reviewed: Yes     Total Time for Visit, including Counseling / Coordination of Care: 45 minutes  Greater than 50% of this total time spent on direct patient counseling and coordination of care  ** Please Note: This note has been constructed using a voice recognition system   **

## 2017-10-08 NOTE — CONSULTS
PATIENT NAME: Anthony Reynolds  YOB: 1960   MEDICAL RECORD NUMBER: [id number]  Chief Complaint/Reason for Consult: headache    HPI: Anthony Reynodls is a 60 y o  female with history of multiple medical and psychiatric problems including Addisons, Bipolar, DVT, HTN, migraine headaches, orthostatic hypotension, polypharmacy who presented to the hospital with gradual onset holocephalic headache with associated photophonophobia, blurry visiona nd right sided numbness  She received reglan, benadryl, magnesium, solumedrol and depacon in the ER yet headache didn't break  She has had multiple ER visit to both this institution or [facility] for various complaints  She was last seen in 7/31/17 by Danelle Hood and Deborah Mcbride and prior to this by Dr Ailyn Adan in May 2017  In May she had intractable migraine with left sided numbness and itngling  She has presented with syncope and postural hypotension         PAST MEDICAL HISTORY  Past Medical History:   Diagnosis Date    [facility]'s [facility]     Bipolar disorder     Cervical radiculopathy     Chronic back pain     D[facility], lower extremity ([facility] ) 1991    Fibromyalgia     History of TIAs     cannot remember details    Hypertension     Hypokalemia     Migraine     Psychiatric disorder     Anxiety, major depression, bipolar    Spinal stenosis     Syncope 2014    orthostatic hypotension        PAST SURGICAL HISTORY  Past Surgical History:   Procedure Laterality Date    APPENDECTOMY      GASTRIC RESTRICTION SURGERY      Gastric Sleeve Nov 2015    HYSTERECTOMY      JOINT REPLACEMENT      Left Knee 2011 and Right Hip 2010        ALLERGIES: Ketorolac; Risperidone; and Temazepam     CURRENT MEDICATIONS  Scheduled Meds:  ARIPiprazole 10 mg Oral Daily   aspirin 325 mg Oral Daily   atorvastatin 40 mg Oral QPM   fluvoxaMINE 300 mg Oral HS   gabapentin 600 mg Oral TID   heparin (porcine) 5,000 Units Subcutaneous Q8H [address]   hydrocortisone 20 mg Oral BID   lamoTRIgine 200 mg Oral Daily   zonisamide 50 mg Oral Daily     Continuous Infusions:   PRN Meds:  LORazepam    LORazepam    methocarbamol    ondansetron     SOCIAL HISTORY   reports that she has quit smoking  She has never used smokeless tobacco  She reports that she does not drink alcohol or use drugs  FAMILY HISTORY  Family History   Problem Relation Age of Onset    Breast cancer Mother     Migraines Mother     Arthritis Mother     Kidney disease Father     Heart disease Father     Diabetes Father     Arthritis Father     Brain cancer Brother     Seizures Brother         REVIEW OF SYSTEMS   Extensive 12 point ROS conducted, negative except fro above  PHYSICAL EXAMINATION  Temp:  [98 °F (36 7 °C)-98 9 °F (37 2 °C)] 98 7 °F (37 1 °C)  HR:  [55-99] 99  Resp:  [16-18] 16  BP: (100-143)/(43-76) 108/65   General Examination: In no apparent distress, well developed and well nourished, and cooperative   HEENT: Normocephalic, Atraumatic  Moist mucus membranes  Anicteric  PPC    CVS: Regular rate and rhythm  S1 S2 noted  No audible murmurs  No carotids bruits  Peripheral pulses palpable throughout   Lungs: Clear to auscultation bilaterally  No rales, rhonchi, wheezing  Abdomen: Bowel sounds positive  Non- tender  Non-distended  No organomegaly  Ext: No edema   Psych: Thought content - No VH/AH  No delusions  Though Process - logical    Skin - No rash    Neurological Examination:   Mental Status: The patient was awake, alert, attentive, oriented to person, place, and time  Recent and remote memory intact to conversation with no evidence of language dysfunction  Satisfactory fund of knowledge  Normal attention span and concentration  Able to name, repeat, describe a complex scene  Cranial Nerves:   I: smell Not tested   II: visual fields Full to confrontation  Pupils equal, round, reactive to light with normal accomodation  Fundus: benign fundus     III,IV,VI: extraocular muscles EOMI, no nystagmus   V: right facial numbness  VII: Face is symmetric with no weakness noted  VIII: Audition intact to finger rub bilaterally  IX/X: Uvula midline  Soft palate elevation symmetric  XI: Trapezius and SCM strength 5/5 B/L  XII: Tongue midline with no atrophy or fasciculations with appropriate movement  Motor Examination:   No pronator drift  Bulk: Normal  No atrophy Tone: Normal  Fasciculations: None  Deltoid Biceps Triceps WE   WF   FF IO     Right        5         5          5         5      5      5   5        Left           5        5          5          5      5     5   5                       IP        Quad   Ham     TA       Gastroc   Right      5            5          5         5                5  Left         5            5         5         5                5       Reflexes:                   Biceps Brachioradialis Triceps Patella Achilles Plantars   Right          2+            2+                  2+        2+       2+         Down   Left            2+             2+                 2+         2+       2+         Down     Clonus: None    Pathological Reflexes:  Hoffmans: negative  Babinsky: negative  Jaw Jerk: negative    Coordination: Patient able to perform normal finger-to-nose and heel to shin appropriately  Normal rapid alternating movements  Sensory: diminished sensation on the right compared to left in all modalities  Gait:deferred       Labs:  Recent Results (from the past 24 hour(s))   CBC and differential    Collection Time: 10/08/17  7:40 AM   Result Value Ref Range    WBC 7 57 4 31 - 10 16 Thousand/uL    RBC 4 10 3 81 - 5 12 Million/uL    Hemoglobin 12 4 11 5 - 15 4 g/dL    Hematocrit 37 4 34 8 - 46 1 %    MCV 91 82 - 98 fL    MCH 30 2 26 8 - 34 3 pg    MCHC 33 2 31 4 - 37 4 g/dL    RDW 15 6 (H) 11 6 - 15 1 %    MPV 10 5 8 9 - 12 7 fL    Platelets 423 448 - 833 Thousands/uL    nRBC 0 /100 WBCs    Neutrophils Relative 68 43 - 75 %    Lymphocytes Relative 20 14 - 44 %    Monocytes Relative 8 4 - 12 %    Eosinophils Relative 3 0 - 6 %    Basophils Relative 1 0 - 1 %    Neutrophils Absolute 5 22 1 85 - 7 62 Thousands/µL    Lymphocytes Absolute 1 53 0 60 - 4 47 Thousands/µL    Monocytes Absolute 0 57 0 17 - 1 22 Thousand/µL    Eosinophils Absolute 0 19 0 00 - 0 61 Thousand/µL    Basophils Absolute 0 06 0 00 - 0 10 Thousands/µL   Protime-INR    Collection Time: 10/08/17  7:40 AM   Result Value Ref Range    Protime 12 2 12 1 - 14 4 seconds    INR 0 91 0 86 - 1 16   APTT    Collection Time: 10/08/17  7:40 AM   Result Value Ref Range    PTT 26 23 - 35 seconds   Comprehensive metabolic panel    Collection Time: 10/08/17  7:40 AM   Result Value Ref Range    Sodium 143 136 - 145 mmol/L    Potassium 3 3 (L) 3 5 - 5 3 mmol/L    Chloride 107 100 - 108 mmol/L    CO2 29 21 - 32 mmol/L    Anion Gap 7 4 - 13 mmol/L    BUN 12 5 - 25 mg/dL    Creatinine 0 93 0 60 - 1 30 mg/dL    Glucose 110 65 - 140 mg/dL    Calcium 9 1 8 3 - 10 1 mg/dL    AST 21 5 - 45 U/L    ALT 22 12 - 78 U/L    Alkaline Phosphatase 92 46 - 116 U/L    Total Protein 7 1 6 4 - 8 2 g/dL    Albumin 3 5 3 5 - 5 0 g/dL    Total Bilirubin 0 24 0 20 - 1 00 mg/dL    eGFR 68 ml/min/1 73sq m   Magnesium    Collection Time: 10/08/17  7:40 AM   Result Value Ref Range    Magnesium 2 0 1 6 - 2 6 mg/dL   Rapid drug screen, urine    Collection Time: 10/08/17  7:40 AM   Result Value Ref Range    Amph/Meth UR Negative Negative    Barbiturate Ur Negative Negative    Benzodiazepine Urine Negative Negative    Cocaine Urine Negative Negative    Methadone Urine Negative Negative    Opiate Urine Negative Negative    PCP Ur Negative Negative    THC Urine Negative Negative   ECG 12 lead    Collection Time: 10/08/17  7:50 AM   Result Value Ref Range    Ventricular Rate 56 BPM    Atrial Rate 56 BPM    FL Interval 154 ms    QRSD Interval 84 ms    QT Interval 426 ms    QTC Interval 411 ms    P [place] 26 degrees    QRS Axis -15 degrees    T Wave Axis 27 degrees   ED Urine Macroscopic    Collection Time: 10/08/17  7:57 AM   Result Value Ref Range    Color, UA Yellow     Clarity, UA Clear     pH, UA 5 0 4 5 - 8 0    Leukocytes, UA Trace (A) Negative    Nitrite, UA Negative Negative    Protein, UA Negative Negative mg/dl    Glucose, UA Negative Negative mg/dl    Ketones, UA Negative Negative mg/dl    Urobilinogen, UA 0 2 0 2, 1 0 E U /dl E U /dl    Bilirubin, UA Negative Negative    Blood, UA Negative Negative    Specific Gravity, UA <=1 005 1 003 - 1 030   Urine Microscopic    Collection Time: 10/08/17  7:57 AM   Result Value Ref Range    RBC, UA None Seen None Seen, 0-5 /hpf    WBC, UA 2-4 (A) None Seen, 0-5, 5-55, 5-65 /hpf    Epithelial Cells Occasional None Seen, Occasional /hpf    Bacteria, UA Occasional None Seen, Occasional /hpf            panel  Results from last 7 days  Lab Units 10/08/17  0740   SODIUM mmol/L 143   POTASSIUM mmol/L 3 3*   CHLORIDE mmol/L 107   GLUCOSE RANDOM mg/dL 110   BUN mg/dL 12   CREATININE mg/dL 0 93      Results from last 7 days  Lab Units 10/08/17  0757 10/08/17  0740   HEMATOCRIT %  --  37 4   HEMOGLOBIN g/dL  --  12 4   MCH pg  --  30 2   MCHC g/dL  --  33 2   MCV fL  --  91   MPV fL  --  10 5   PLATELETS Thousands/uL  --  256   RDW %  --  15 6*   WBC UA /hpf 2-4*  --    WBC Thousand/uL  --  7 57      Results from last 7 days  Lab Units 10/08/17  0740   INR  0 91   PTT seconds 26      Results from last 7 days  Lab Units 10/08/17  0740   CO2 mmol/L 29   BUN mg/dL 12   CREATININE mg/dL 0 93   GLUCOSE RANDOM mg/dL 110    Lab Results   Component Value Date    ALT 22 10/08/2017    AST 21 10/08/2017    ALKPHOS 92 10/08/2017          Invalid input(s): CHOLESTEROL, TRIGLYCERIDE, NONHDL Lab Results   Component Value Date    HGBA1C 5 6 04/05/2015    TSH i: No components found for: TSH Imaging: CT head     CT head - no acute disease  MRI 5/2017 - negative for any acute or chronic changes        ASSESSMENT/PLAN  61 yo female with complex migraine  CT and MRI clean  No real evidence of actually cerebrovascular disease  Said she tolerated DHE at some point and it may have helped so will start on DHE protocol  Can continue magnesium sulfate 2 grams daily, depacon 1 gram daily as well  Team will follow  Cancel stroke pathway

## 2017-10-08 NOTE — PROGRESS NOTES
Spoke with Dr Moris Recio  Stroke pathway discontinued  Patient will still be q4h neuro checks and vital signs

## 2017-10-08 NOTE — ED PROVIDER NOTES
History  Chief Complaint   Patient presents with    Headache     H/A, light sensitivity,  and n/v that started two days ago  Hx of migraines  Medical Problem - Major   Location:  Headache over her whole head  Severity:  Severe  Onset quality:  Sudden  Duration: Started on Friday  Timing:  Constant  Progression:  Unchanged  Chronicity:  Recurrent  Relieved by:  Nothing  Worsened by:  Light  Ineffective treatments:  Her usual medication  Associated symptoms: abdominal pain, fatigue, headaches, myalgias, nausea and vomiting    Associated symptoms: no chest pain, no congestion, no cough, no diarrhea, no fever, no rhinorrhea, no shortness of breath and no sore throat     This patient has a very complicated history including fibromyalgia, gastric bypass, Andrés's disease and she has had complex migraines in the past with a negative MRI recently who presents with a headache since Friday that she described as global causing photophobia, nausea, vomiting as well as right arm and right leg numbness without weakness  No fevers or chills  No neck pain  No chest pain or shortness of breath  She has occasional abdominal pain with vomiting  No urinary complaints  No fevers or chills  She was just recently [facility] last week for syncope and fall for which she had CT evaluation which was apparently normal  Because of the nausea and vomiting she is having trouble keeping her medications down which is unfortunate because 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 Taking?    ARIPiprazole (ABILIFY) 5 mg tablet 10/8/2017 at Unknown time  No Yes   Sig: Take 1 tablet by mouth daily   Patient taking differently: Take 10 mg by mouth daily     LORazepam (ATIVAN) 2 mg tablet 10/8/2017 at Unknown time  No Yes   Sig: Take 0 5 tablets by mouth every 6 (six) hours as needed for anxiety (2- 2mg during the day and 4mg at night) for up to 10 days   aspirin 325 mg tablet 10/8/2017 at Unknown time  Yes Yes   Sig: Take 325 mg by mouth daily  aspirin-acetaminophen-caffeine (EXCEDRIN MIGRAINE) 250-250-65 MG per tablet   Yes Yes   Sig: Take 1 tablet by mouth every 6 (six) hours as needed for headaches (took at 5 am this morning)   fluvoxaMINE (LUVOX) 100 mg tablet 10/8/2017 at Unknown time  Yes Yes   Sig: Take 300 mg by mouth daily at bedtime     furosemide (LASIX) 20 mg tablet 10/8/2017 at Unknown time  Yes Yes   Sig: Take 20 mg by mouth daily   gabapentin (NEURONTIN) 600 MG tablet 10/8/2017 at Unknown time  Yes Yes   Sig: Take 600 mg by mouth 3 (three) times a day  hydrocortisone (CORTEF) 10 mg tablet 10/8/2017 at Unknown time  No Yes   Sig: Take 1 tablet by mouth 3 (three) times a day   Patient taking differently: Take 20 mg by mouth 2 (two) times a day     lamoTRIgine (LaMICtal) 200 MG tablet 10/8/2017 at Unknown time  Yes Yes   Sig: Take 200 mg by mouth daily     meclizine (ANTIVERT) 25 mg tablet 10/8/2017 at Unknown time  No Yes   Sig: Take 1 tablet by mouth 3 (three) times a day as needed for dizziness   methocarbamol (ROBAXIN) 500 mg tablet 10/8/2017 at Unknown time  No Yes   Sig: Take 2 tablets by mouth 3 (three) times a day as needed for muscle spasms   zonisamide (ZONEGRAN) 50 MG capsule 10/8/2017 at Unknown time  No Yes   Sig: Take 1 capsule by mouth daily      Facility-Administered Medications: None       Past Medical History:   Diagnosis Date    Adrenal insufficiency (Andrés's disease) ([facility] [address] )     Bipolar disorder     Cervical radiculopathy     Chronic back pain     DVT, lower extremity ([facility] [address] ) 1991    Fibromyalgia     History of TIAs     cannot remember details    Hypertension     Hypokalemia     Migraine     Psychiatric disorder     Anxiety, major depression, bipolar    Spinal stenosis     Syncope 2014    orthostatic hypotension       Past Surgical History:   Procedure Laterality Date    APPENDECTOMY      GASTRIC RESTRICTION SURGERY      Gastric Sleeve Nov 2015    HYSTERECTOMY      JOINT REPLACEMENT      Left Knee 2011 and Right Hip 2010       Family History   Problem Relation Age of Onset    Breast cancer Mother     Migraines Mother     Arthritis Mother     Kidney disease Father     Heart disease Father     Diabetes Father     Arthritis Father     Brain cancer Brother     Seizures Brother      I have reviewed and agree with the history as documented  Social History   Substance Use Topics    Smoking status: Former Smoker    Smokeless tobacco: Never Used    Alcohol use No        Review of Systems   Constitutional: Positive for appetite change and fatigue  Negative for chills and fever  HENT: Negative for congestion, rhinorrhea and sore throat  Respiratory: Negative for cough, chest tightness and shortness of breath  Cardiovascular: Negative for chest pain  Gastrointestinal: Positive for abdominal pain, nausea and vomiting  Negative for diarrhea  Genitourinary: Negative for dysuria and flank pain  Musculoskeletal: Positive for myalgias  Negative for arthralgias and neck pain  Neurological: Positive for light-headedness and headaches  Negative for dizziness, seizures, syncope, facial asymmetry, speech difficulty, weakness and numbness  All other systems reviewed and are negative  Physical Exam  ED Triage Vitals   Temperature Pulse Respirations Blood Pressure SpO2   10/08/17 0647 10/08/17 0647 10/08/17 0647 10/08/17 0647 10/08/17 0647   98 °F (36 7 °C) 75 18 143/64 96 %      Temp Source Heart Rate Source Patient Position - Orthostatic VS BP Location FiO2 (%)   10/08/17 0647 10/08/17 0801 10/08/17 1212 10/08/17 1212 --   Oral Monitor Sitting Left arm       Pain Score       10/08/17 0647       Worst Possible Pain           Physical Exam   Constitutional: She appears well-developed and well-nourished  She is cooperative  Non-toxic appearance  She does not have a sickly appearance  She does not appear ill  No distress  HENT:   Head: Normocephalic and atraumatic  Right Ear: Hearing normal  No drainage or swelling  Left Ear: Hearing normal  No drainage or swelling  Mouth/Throat: Mucous membranes are normal    Eyes: Conjunctivae, EOM and lids are normal  Pupils are equal, round, and reactive to light  Right eye exhibits no discharge  Left eye exhibits no discharge  Neck: Trachea normal and normal range of motion  Neck supple  No JVD present  Cardiovascular: Normal rate, regular rhythm, normal heart sounds, intact distal pulses and normal pulses  Exam reveals no gallop and no friction rub  No murmur heard  Pulmonary/Chest: Effort normal and breath sounds normal  No stridor  No respiratory distress  She has no wheezes  She has no rales  Abdominal: Soft  Normal appearance  She exhibits no distension, no ascites and no mass  There is no hepatosplenomegaly  There is no tenderness  There is no rebound, no guarding and no CVA tenderness  Musculoskeletal: Normal range of motion  She exhibits no edema  Lymphadenopathy:        Right: No inguinal adenopathy present  Left: No inguinal adenopathy present  Neurological: She is alert  She has normal strength  A sensory deficit is present  No cranial nerve deficit  She exhibits normal muscle tone  Coordination and gait normal  GCS eye subscore is 4  GCS verbal subscore is 5  GCS motor subscore is 6  Patient tells me she has numbness to light touch on the right hand and is able to feel me touching her right forearm and right upper arm but it is less than on the left  She also says she can feel me touching her right leg but it is less than on the left  Skin: Skin is warm, dry and intact  No rash noted  She is not diaphoretic  No pallor  Psychiatric: She has a normal mood and affect  Her speech is normal  Cognition and memory are normal    Nursing note and vitals reviewed        ED Medications  Medications   ARIPiprazole (ABILIFY) tablet 10 mg (10 mg Oral Not Given 10/8/17 1433)   fluvoxaMINE (LUVOX) tablet 300 mg (not administered)   gabapentin (NEURONTIN) capsule 600 mg (600 mg Oral Given 10/8/17 1500)   hydrocortisone (CORTEF) tablet 20 mg (20 mg Oral Given 10/8/17 1831)   lamoTRIgine (LaMICtal) tablet 200 mg (200 mg Oral Not Given 10/8/17 1439)   methocarbamol (ROBAXIN) tablet 1,000 mg (1,000 mg Oral Given 10/8/17 1246)   zonisamide (ZONEGRAN) capsule 50 mg (50 mg Oral Given 10/8/17 1459)   atorvastatin (LIPITOR) tablet 40 mg (40 mg Oral Not Given 10/8/17 1833)   aspirin tablet 325 mg (325 mg Oral Given 10/8/17 1459)   heparin (porcine) subcutaneous injection 5,000 Units (5,000 Units Subcutaneous Given 10/8/17 1459)   LORazepam (ATIVAN) tablet 2 mg (not administered)   LORazepam (ATIVAN) tablet 0 5 mg (0 5 mg Oral Given 10/8/17 1641)   ondansetron (ZOFRAN) injection 4 mg (4 mg Intravenous Given 10/8/17 1725)   ondansetron (ZOFRAN) injection 4 mg (not administered)   dihydroergotamine (DHE) 0 5 mg in sodium chloride 0 9 % 50 mL IVPB (not administered)   dihydroergotamine (DHE) 0 5 mg in sodium chloride 0 9 % 50 mL IVPB (not administered)   dihydroergotamine (DHE) 1 mg in sodium chloride 0 9 % 50 mL IVPB (not administered)   sodium chloride 0 9 % bolus 1,000 mL (0 mL Intravenous Stopped 10/8/17 1010)   metoclopramide (REGLAN) injection 10 mg (10 mg Intravenous Given 10/8/17 0754)   diphenhydrAMINE (BENADRYL) injection 25 mg (25 mg Intravenous Given 10/8/17 0753)   magnesium sulfate 2 g/50 mL IVPB (premix) 2 g (0 g Intravenous Stopped 10/8/17 0900)   methylPREDNISolone sodium succinate (Solu-MEDROL) injection 500 mg (500 mg Intravenous Given 10/8/17 0800)   valproate (DEPACON) 1,000 mg in sodium chloride 0 9 % 50 mL for headache (0 mg Intravenous Stopped 10/8/17 1102)       Diagnostic Studies  Labs Reviewed   CBC AND DIFFERENTIAL - Abnormal        Result Value Ref Range Status    RDW 15 6 (*) 11 6 - 15 1 % Final    WBC 7 57  4 31 - 10 16 Thousand/uL Final    RBC 4 10  3 81 - 5 12 Million/uL Final    Hemoglobin 12 4  11 5 - 15 4 g/dL Final    Hematocrit 37 4  34 8 - 46 1 % Final    MCV 91  82 - 98 fL Final    MCH 30 2  26 8 - 34 3 pg Final    MCHC 33 2  31 4 - 37 4 g/dL Final    MPV 10 5  8 9 - 12 7 fL Final    Platelets 617  157 - 390 Thousands/uL Final    nRBC 0  /100 WBCs Final    Neutrophils Relative 68  43 - 75 % Final    Lymphocytes Relative 20  14 - 44 % Final    Monocytes Relative 8  4 - 12 % Final    Eosinophils Relative 3  0 - 6 % Final    Basophils Relative 1  0 - 1 % Final    Neutrophils Absolute 5 22  1 85 - 7 62 Thousands/µL Final    Lymphocytes Absolute 1 53  0 60 - 4 47 Thousands/µL Final    Monocytes Absolute 0 57  0 17 - 1 22 Thousand/µL Final    Eosinophils Absolute 0 19  0 00 - 0 61 Thousand/µL Final    Basophils Absolute 0 06  0 00 - 0 10 Thousands/µL Final   COMPREHENSIVE METABOLIC PANEL - Abnormal     Potassium 3 3 (*) 3 5 - 5 3 mmol/L Final    Sodium 143  136 - 145 mmol/L Final    Chloride 107  100 - 108 mmol/L Final    CO2 29  21 - 32 mmol/L Final    Anion Gap 7  4 - 13 mmol/L Final    BUN 12  5 - 25 mg/dL Final    Creatinine 0 93  0 60 - 1 30 mg/dL Final    Comment: Standardized to IDMS reference method    Glucose 110  65 - 140 mg/dL Final    Comment:   If the patient is fasting, the ADA then defines impaired fasting glucose as > 100 mg/dL and diabetes as > or equal to 123 mg/dL  Specimen collection should occur prior to Sulfasalazine administration due to the potential for falsely depressed results  Specimen collection should occur prior to Sulfapyridine administration due to the potential for falsely elevated results  Calcium 9 1  8 3 - 10 1 mg/dL Final    AST 21  5 - 45 U/L Final    Comment:   Specimen collection should occur prior to Sulfasalazine administration due to the potential for falsely depressed results       ALT 22  12 - 78 U/L Final    Comment:   Specimen collection should occur prior to Sulfasalazine administration due to the potential for falsely depressed results  Alkaline Phosphatase 92  46 - 116 U/L Final    Total Protein 7 1  6 4 - 8 2 g/dL Final    Albumin 3 5  3 5 - 5 0 g/dL Final    Total Bilirubin 0 24  0 20 - 1 00 mg/dL Final    eGFR 68  ml/min/1 73sq m Final    Narrative:     National Kidney Disease Education Program recommendations are as follows:  GFR calculation is accurate only with a steady state creatinine  Chronic Kidney disease less than 60 ml/min/1 73 sq  meters  Kidney failure less than 15 ml/min/1 73 sq  meters  URINE MICROSCOPIC - Abnormal     WBC, UA 2-4 (*) None Seen, 0-5, 5-55, 5-65 /hpf Final    RBC, UA None Seen  None Seen, 0-5 /hpf Final    Epithelial Cells Occasional  None Seen, Occasional /hpf Final    Bacteria, UA Occasional  None Seen, Occasional /hpf Final   POCT URINALYSIS DIPSTICK - Abnormal    ED URINE MACROSCOPIC - Abnormal     Leukocytes, UA Trace (*) Negative Final    Color, UA Yellow   Final    Clarity, UA Clear   Final    pH, UA 5 0  4 5 - 8 0 Final    Nitrite, UA Negative  Negative Final    Protein, UA Negative  Negative mg/dl Final    Glucose, UA Negative  Negative mg/dl Final    Ketones, UA Negative  Negative mg/dl Final    Urobilinogen, UA 0 2  0 2, 1 0 E U /dl E U /dl Final    Bilirubin, UA Negative  Negative Final    Blood, UA Negative  Negative Final    Specific Gravity, UA <=1 005  1 003 - 1 030 Final    Narrative:     CLINITEK RESULT   PROTIME-INR - Normal    Protime 12 2  12 1 - 14 4 seconds Final    INR 0 91  0 86 - 1 16 Final   APTT - Normal    PTT 26  23 - 35 seconds Final    Narrative:      Therapeutic Heparin Range = 60-90 seconds   MAGNESIUM - Normal    Magnesium 2 0  1 6 - 2 6 mg/dL Final   RAPID DRUG SCREEN, URINE - Normal    Amph/Meth UR Negative  Negative Final    Barbiturate Ur Negative  Negative Final    Benzodiazepine Urine Negative  Negative Final    Cocaine Urine Negative  Negative Final    Methadone Urine Negative  Negative Final    Opiate Urine Negative  Negative Final    PCP Ur Negative  Negative Final    THC Urine Negative  Negative Final    Narrative:     FOR MEDICAL PURPOSES ONLY  IF CONFIRMATION NEEDED PLEASE CONTACT THE LAB WITHIN 5 DAYS  Drug Screen Cutoff Levels:  AMPHETAMINE/METHAMPHETAMINES  1000 ng/mL  BARBITURATES     200 ng/mL  BENZODIAZEPINES     200 ng/mL  COCAINE      300 ng/mL  METHADONE      300 ng/mL  OPIATES      300 ng/mL  PHENCYCLIDINE     25 ng/mL  THC       50 ng/mL       CT head without contrast   Final Result      No acute intracranial abnormality           Workstation performed: [id number]             Procedures  ECG 12 Lead Documentation  Date/Time: 10/8/2017 7:52 AM  Performed by: Alberto Harvey by: Harrison Temple     Indications / Diagnosis:  Headache  Patient location:  ED  Previous ECG:     Comparison to cardiac monitor: Yes    Interpretation:     Interpretation: non-specific    Rate:     ECG rate:  56    ECG rate assessment: bradycardic    Rhythm:     Rhythm: sinus bradycardia    Ectopy:     Ectopy: none    QRS:     QRS axis:  Normal    QRS intervals:  Normal  Conduction:     Conduction: normal    ST segments:     ST segments:  Normal  T waves:     T waves: normal    Other findings:     Other findings comment:  Possibly LVH          Phone Contacts  ED Phone Contact    ED Course  ED Course              NIH Stroke Scale    Flowsheet Row Most Recent Value   Level of Consciousness (1a )  0 Filed at: 10/08/2017 1217   LOC Questions (1b )  0 Filed at: 10/08/2017 1217   LOC Commands (1c )  0 Filed at: 10/08/2017 1217   Best Gaze (2 )  0 Filed at: 10/08/2017 1217   Visual (3 )  0 Filed at: 10/08/2017 1217   Facial Palsy (4 )  0 Filed at: 10/08/2017 1217   Motor Arm, Left (5a )  0 Filed at: 10/08/2017 1217   Motor Arm, Right (5b )  0 Filed at: 10/08/2017 1217   Motor Leg, Left (6a )  0 Filed at: 10/08/2017 1217   Motor Leg, Right (6b )  1 Filed at: 10/08/2017 1217   Limb Ataxia (7 )  0 Filed at: 10/08/2017 1217   Sensory (8 )  1 Filed at: 10/08/2017 1217   Best Language (9 )  0 Filed at: 10/08/2017 1217   Dysarthria (10 )  0 Filed at: 10/08/2017 1217   Extinction and Inattention (11 ) (Formerly Neglect)  0 Filed at: 10/08/2017 1217   Total  2 Filed at: 10/08/2017 1217                  Initial Sepsis Screening     Row Name 10/08/17 1021                Is the patient's history suggestive of a new or worsening infection? (!)  Yes (Proceed)  -CS        Suspected source of infection acute abdominal infection  -CS        Are two or more of the following signs & symptoms of infection both present and new to the patient? (!)  Yes (Proceed)  -CS        Indicate SIRS criteria          If the answer is yes to both questions, suspicion of sepsis is present          If severe sepsis is present AND tissue hypoperfusion perists in the hour after fluid resuscitation or lactate > 4, the patient meets criteria for SEPTIC SHOCK          Are any of the following organ dysfunction criteria present within 6 hours of suspected infection and SIRS criteria that are NOT considered to be chronic conditions?         Organ dysfunction          Date of presentation of severe sepsis          Time of presentation of severe sepsis          Tissue hypoperfusion persists in the hour after crystalloid fluid administration, evidenced, by either:          Was hypotension present within one hour of the conclusion of crystalloid fluid administration?           Date of presentation of septic shock          Time of presentation of septic shock            User Key  (r) = Recorded By, (t) = Taken By, (c) = Cosigned By    [id number] Name Provider Type    Priyank German MD Physician                  MDM  Number of Diagnoses or Management Options  Migraine:   Diagnosis management comments: Patient has intractable migraine with a complex medical history and will need to be admitted to the hospital as the medications are not helping her headache  I did do a CT scan because of the numbness in the right arm which is normal   She has a very low NIH stroke score and I do not believe that this is a stroke as she has had multiple prior CT scans and an MRI  She would not be a tPA candidate secondary to her symptoms going on since Friday and her low NIH stroke score  I did discuss the case with Neurology who is in agreement with the treatment and plan  I did discuss case with Internal Medicine for admission  Amount and/or Complexity of Data Reviewed  Clinical lab tests: ordered and reviewed  Tests in the radiology section of CPT®: ordered and reviewed    Patient Progress  Patient progress: stable    CritCare Time    Disposition  Final diagnoses:   Migraine - complex migraine     ED Disposition     ED Disposition Condition Comment    Admit  Case was discussed with Earnest and the patient's admission status was agreed to be Admission Status: inpatient status to the service of Dr Veda Crespo   Follow-up Information    None       Current Discharge Medication List      CONTINUE these medications which have NOT CHANGED    Details   ARIPiprazole (ABILIFY) 5 mg tablet Take 1 tablet by mouth daily  Qty: 30 tablet, Refills: 3      aspirin 325 mg tablet Take 325 mg by mouth daily  aspirin-acetaminophen-caffeine (EXCEDRIN MIGRAINE) 250-250-65 MG per tablet Take 1 tablet by mouth every 6 (six) hours as needed for headaches (took at 5 am this morning)      fluvoxaMINE (LUVOX) 100 mg tablet Take 300 mg by mouth daily at bedtime        furosemide (LASIX) 20 mg tablet Take 20 mg by mouth daily      gabapentin (NEURONTIN) 600 MG tablet Take 600 mg by mouth 3 (three) times a day  hydrocortisone (CORTEF) 10 mg tablet Take 1 tablet by mouth 3 (three) times a day  Qty: 90 tablet, Refills: 3      lamoTRIgine (LaMICtal) 200 MG tablet Take 200 mg by mouth daily        LORazepam (ATIVAN) 2 mg tablet Take 0 5 tablets by mouth every 6 (six) hours as needed for anxiety (2- 2mg during the day and 4mg at night) for up to 10 days  Qty: 30 tablet, Refills: 0      meclizine (ANTIVERT) 25 mg tablet Take 1 tablet by mouth 3 (three) times a day as needed for dizziness  Qty: 15 tablet, Refills: 0      methocarbamol (ROBAXIN) 500 mg tablet Take 2 tablets by mouth 3 (three) times a day as needed for muscle spasms  Refills: 0      zonisamide (ZONEGRAN) 50 MG capsule Take 1 capsule by mouth daily  Qty: 30 capsule, Refills: 3           No discharge procedures on file      ED Provider  Electronically Signed by       Susan Patton MD  10/08/17 3100

## 2017-10-08 NOTE — PLAN OF CARE
Activity Intolerance/Impaired Mobility     Mobility/activity is maintained at optimum level for patient [address] Discharge to home or other facility with appropriate resources Progressing        INFECTION - ADULT     Absence or prevention of progression during hospitalization Progressing        Knowledge Deficit     Patient/family/caregiver demonstrates understanding of disease process, treatment plan, medications, and discharge instructions Progressing        METABOLIC, FLUID AND ELECTROLYTES - ADULT     Electrolytes maintained within normal limits Progressing     Fluid balance maintained Progressing        MUSCULOSKELETAL - ADULT     Maintain or return mobility to safest level of function Progressing        Neurological Deficit     Neurological status is stable or improving Progressing        NEUROSENSORY - ADULT     Achieves stable or improved neurological status Progressing     Achieves maximal functionality and self care Progressing        Nutrition     Nutrition/Hydration status is improving Progressing        PAIN - ADULT     Verbalizes/displays adequate comfort level or baseline comfort level Progressing        Potential for Aspiration     Non-ventilated patient's risk of aspiration is minimized Progressing        Potential for Falls     Patient will remain free of falls Progressing        SAFETY ADULT     Maintain or return to baseline ADL function Progressing     Maintain or return mobility status to optimal level Progressing        SKIN/TISSUE INTEGRITY - ADULT     Skin integrity remains intact Progressing

## 2017-10-09 LAB
ANION GAP SERPL CALCULATED.3IONS-SCNC: 11 MMOL/L (ref 4–13)
BUN SERPL-MCNC: 12 MG/DL (ref 5–25)
CALCIUM SERPL-MCNC: 9.5 MG/DL (ref 8.3–10.1)
CHLORIDE SERPL-SCNC: 107 MMOL/L (ref 100–108)
CHOLEST SERPL-MCNC: 201 MG/DL (ref 50–200)
CO2 SERPL-SCNC: 26 MMOL/L (ref 21–32)
CREAT SERPL-MCNC: 0.89 MG/DL (ref 0.6–1.3)
EST. AVERAGE GLUCOSE BLD GHB EST-MCNC: 108 MG/DL
GFR SERPL CREATININE-BSD FRML MDRD: 72 ML/MIN/1.73SQ M
GLUCOSE SERPL-MCNC: 134 MG/DL (ref 65–140)
HBA1C MFR BLD: 5.4 % (ref 4.2–6.3)
HDLC SERPL-MCNC: 84 MG/DL (ref 40–60)
LDLC SERPL CALC-MCNC: 105 MG/DL (ref 0–100)
PLATELET # BLD AUTO: 269 THOUSANDS/UL (ref 149–390)
PMV BLD AUTO: 11.2 FL (ref 8.9–12.7)
POTASSIUM SERPL-SCNC: 3.9 MMOL/L (ref 3.5–5.3)
SODIUM SERPL-SCNC: 144 MMOL/L (ref 136–145)
TRIGL SERPL-MCNC: 62 MG/DL

## 2017-10-09 PROCEDURE — G8978 MOBILITY CURRENT STATUS: HCPCS

## 2017-10-09 PROCEDURE — 97110 THERAPEUTIC EXERCISES: CPT

## 2017-10-09 PROCEDURE — G8987 SELF CARE CURRENT STATUS: HCPCS

## 2017-10-09 PROCEDURE — 83036 HEMOGLOBIN GLYCOSYLATED A1C: CPT | Performed by: PHYSICIAN ASSISTANT

## 2017-10-09 PROCEDURE — 85049 AUTOMATED PLATELET COUNT: CPT | Performed by: PHYSICIAN ASSISTANT

## 2017-10-09 PROCEDURE — G8989 SELF CARE D/C STATUS: HCPCS

## 2017-10-09 PROCEDURE — G8988 SELF CARE GOAL STATUS: HCPCS

## 2017-10-09 PROCEDURE — 97163 PT EVAL HIGH COMPLEX 45 MIN: CPT

## 2017-10-09 PROCEDURE — 80061 LIPID PANEL: CPT | Performed by: PHYSICIAN ASSISTANT

## 2017-10-09 PROCEDURE — 80048 BASIC METABOLIC PNL TOTAL CA: CPT | Performed by: PHYSICIAN ASSISTANT

## 2017-10-09 PROCEDURE — 97166 OT EVAL MOD COMPLEX 45 MIN: CPT

## 2017-10-09 PROCEDURE — G8979 MOBILITY GOAL STATUS: HCPCS

## 2017-10-09 RX ORDER — METOCLOPRAMIDE HYDROCHLORIDE 5 MG/ML
10 INJECTION INTRAMUSCULAR; INTRAVENOUS EVERY 6 HOURS SCHEDULED
Status: DISCONTINUED | OUTPATIENT
Start: 2017-10-09 | End: 2017-10-11

## 2017-10-09 RX ORDER — ACETAMINOPHEN 325 MG/1
650 TABLET ORAL EVERY 6 HOURS PRN
Status: DISCONTINUED | OUTPATIENT
Start: 2017-10-09 | End: 2017-10-11 | Stop reason: HOSPADM

## 2017-10-09 RX ORDER — PROCHLORPERAZINE MALEATE 10 MG
5 TABLET ORAL EVERY 6 HOURS
Status: DISCONTINUED | OUTPATIENT
Start: 2017-10-09 | End: 2017-10-11 | Stop reason: HOSPADM

## 2017-10-09 RX ORDER — BUTALBITAL, ACETAMINOPHEN AND CAFFEINE 50; 325; 40 MG/1; MG/1; MG/1
1 TABLET ORAL EVERY 4 HOURS PRN
Status: DISCONTINUED | OUTPATIENT
Start: 2017-10-09 | End: 2017-10-11 | Stop reason: HOSPADM

## 2017-10-09 RX ADMIN — LORAZEPAM 2 MG: 1 TABLET ORAL at 21:30

## 2017-10-09 RX ADMIN — GABAPENTIN 600 MG: 300 CAPSULE ORAL at 15:53

## 2017-10-09 RX ADMIN — ONDANSETRON 4 MG: 2 INJECTION INTRAMUSCULAR; INTRAVENOUS at 05:02

## 2017-10-09 RX ADMIN — GABAPENTIN 600 MG: 300 CAPSULE ORAL at 08:05

## 2017-10-09 RX ADMIN — HEPARIN SODIUM 5000 UNITS: 5000 INJECTION, SOLUTION INTRAVENOUS; SUBCUTANEOUS at 13:26

## 2017-10-09 RX ADMIN — ATORVASTATIN CALCIUM 40 MG: 40 TABLET, FILM COATED ORAL at 17:41

## 2017-10-09 RX ADMIN — PROCHLORPERAZINE MALEATE 5 MG: 10 TABLET, FILM COATED ORAL at 17:42

## 2017-10-09 RX ADMIN — DIHYDROERGOTAMINE MESYLATE: 1 INJECTION, SOLUTION INTRAMUSCULAR; INTRAVENOUS; SUBCUTANEOUS at 21:49

## 2017-10-09 RX ADMIN — HYDROCORTISONE 20 MG: 10 TABLET ORAL at 17:43

## 2017-10-09 RX ADMIN — HEPARIN SODIUM 5000 UNITS: 5000 INJECTION, SOLUTION INTRAVENOUS; SUBCUTANEOUS at 05:27

## 2017-10-09 RX ADMIN — DIHYDROERGOTAMINE MESYLATE: 1 INJECTION, SOLUTION INTRAMUSCULAR; INTRAVENOUS; SUBCUTANEOUS at 05:23

## 2017-10-09 RX ADMIN — ACETAMINOPHEN 650 MG: 325 TABLET ORAL at 01:50

## 2017-10-09 RX ADMIN — ONDANSETRON 4 MG: 2 INJECTION INTRAMUSCULAR; INTRAVENOUS at 01:41

## 2017-10-09 RX ADMIN — ACETAMINOPHEN 650 MG: 325 TABLET ORAL at 12:38

## 2017-10-09 RX ADMIN — ACETAMINOPHEN 650 MG: 325 TABLET ORAL at 20:21

## 2017-10-09 RX ADMIN — ONDANSETRON 4 MG: 2 INJECTION INTRAMUSCULAR; INTRAVENOUS at 13:01

## 2017-10-09 RX ADMIN — ONDANSETRON 4 MG: 2 INJECTION INTRAMUSCULAR; INTRAVENOUS at 10:06

## 2017-10-09 RX ADMIN — HYDROCORTISONE 20 MG: 10 TABLET ORAL at 08:05

## 2017-10-09 RX ADMIN — METOCLOPRAMIDE 10 MG: 5 INJECTION, SOLUTION INTRAMUSCULAR; INTRAVENOUS at 19:06

## 2017-10-09 RX ADMIN — LORAZEPAM 0.5 MG: 0.5 TABLET ORAL at 09:17

## 2017-10-09 RX ADMIN — ASPIRIN 325 MG: 325 TABLET ORAL at 08:05

## 2017-10-09 RX ADMIN — ONDANSETRON 4 MG: 2 INJECTION INTRAMUSCULAR; INTRAVENOUS at 21:37

## 2017-10-09 RX ADMIN — ARIPIPRAZOLE 10 MG: 10 TABLET ORAL at 08:05

## 2017-10-09 RX ADMIN — LORAZEPAM 0.5 MG: 0.5 TABLET ORAL at 17:42

## 2017-10-09 RX ADMIN — VALPROATE SODIUM 500 MG: 100 INJECTION, SOLUTION INTRAVENOUS at 19:06

## 2017-10-09 RX ADMIN — LAMOTRIGINE 200 MG: 100 TABLET ORAL at 08:05

## 2017-10-09 RX ADMIN — PROCHLORPERAZINE MALEATE 5 MG: 10 TABLET, FILM COATED ORAL at 23:17

## 2017-10-09 RX ADMIN — FLUVOXAMINE MALEATE 300 MG: 50 TABLET, FILM COATED ORAL at 23:16

## 2017-10-09 RX ADMIN — ZONISAMIDE 50 MG: 25 CAPSULE ORAL at 08:05

## 2017-10-09 RX ADMIN — GABAPENTIN 600 MG: 300 CAPSULE ORAL at 21:21

## 2017-10-09 RX ADMIN — DIHYDROERGOTAMINE MESYLATE: 1 INJECTION, SOLUTION INTRAMUSCULAR; INTRAVENOUS; SUBCUTANEOUS at 13:16

## 2017-10-09 RX ADMIN — HEPARIN SODIUM 5000 UNITS: 5000 INJECTION, SOLUTION INTRAVENOUS; SUBCUTANEOUS at 21:21

## 2017-10-09 NOTE — PROGRESS NOTES
After administering first dose of dihydroergotamine pt's HR decreased to 50's-60's, but patient was asymptomatic  Qing Robles MD from neurology to notify and was advised continue administer second dose of dihydroergotamine and monitor pt closely

## 2017-10-09 NOTE — PROGRESS NOTES
Around 6:00 pt ambulated to the bedside commode and complained of SOB  Applied 2L of O2; Will continue to monitor patient

## 2017-10-09 NOTE — PHYSICAL THERAPY NOTE
PT Evaluation (19min)  (7:10-7:29)    Past Medical History:   Diagnosis Date    Adrenal insufficiency (Andrés's disease) ([facility] [address] )     Bipolar disorder     Cervical radiculopathy     Chronic back pain     DVT, lower extremity ([facility] [address] )     Fibromyalgia     History of TIAs     cannot remember details    Hypertension     Hypokalemia     Migraine     Psychiatric disorder     Anxiety, major depression, bipolar    Spinal stenosis     Syncope 2014    orthostatic hypotension      10/09/17 0729   Note Type   Note type Eval/Treat   Pain Assessment   Pain Assessment 0-10   Pain Score 8   Pain Type Acute pain   Pain Location Head   Pain Orientation Bilateral   Hospital Pain Intervention(s) Repositioned; Ambulation/increased activity   Home Living   Type of [address] Two level;Stairs to enter with rails  (2 [address]CCA; 14 steps to 2nd floor c rail)   Prior Function   Level of [place] with ADLs and functional mobility   Lives With Spouse  (works)   [address] [place] [address] in the last 6 months 5 to 10   Comments (-) drive 2* syncope   Restrictions/Precautions   Other Precautions Contact/isolation; Fall Risk;Pain   General   Additional Pertinent History pt presents to [facility] c headache, blurry vision, nausea, vomiting + R sided numbness  dx: migrane  imaging (-) for acute CVA  PT consulted for mobility + d/c planning  up c (A)     Family/Caregiver Present No   Cognition   Orientation Level Oriented X4   RUE Assessment   RUE Assessment WFL  (4/5)   LUE Assessment   LUE Assessment WFL  (4/5)   RLE Assessment   RLE Assessment WFL  (4+/5)   LLE Assessment   LLE Assessment WFL  (4+/5)   Coordination   Sensation X   Light Touch   RLE Light Touch Impaired   LLE Light Touch Grossly intact   Bed Mobility   Supine to Sit 5  Supervision   Additional items Verbal cues  (BP supine: 137/69)   Sit to Supine 5  Supervision   Additional items Verbal cues   Additional Comments (BP sittin/69) Transfers   Sit to Stand 5  Supervision   Additional items Verbal cues   Stand to Sit 5  Supervision   Additional items Verbal cues   Ambulation/Elevation   Gait pattern Decreased foot clearance;Narrow ZEESHAN   Gait Assistance 5  Supervision   Additional items Verbal cues   Assistive Device None   Distance 40'x2  (BP standin/68)   Balance   Static Sitting Good   Dynamic Sitting Good   Static Standing Fair   Dynamic Standing Fair   Ambulatory Fair   Activity Tolerance   Activity Tolerance Patient limited by fatigue;Patient limited by pain   Nurse [address] [place] [address]   Assessment   Prognosis Good   Problem List Decreased strength;Decreased endurance; Impaired balance;Decreased mobility; Impaired vision; Impaired sensation;Pain   Assessment pt is a 55y/o f who presents to [facility] c migrane  PMH significant for bipolar, fibromyalgia, spinal stenosis, OA, TIAs + orthostatic hypotension  at baseline, pt (I) c functional mobility s AD  resides c spouse in 2 story home c 2 [address] + 14 steps to 2nd floor  admits to h/o falls in past 6 months 2* syncopal episodes + dizziness  currently presents c deficits in strength, balance, gait quality, pain, activity tolerance, sensation + vision noted in PT exam above  Barthel Index 70/100  ambulated distance of 40'x2 c (S), however limited by dizziness  orthostatic BPs as follows: supine 137/69, sitting 137/69, standing 109/68  would benefit from skilled PT to maximize functional mobility + return to PLOF  upon d/c, recommend pt return home c family support once medically cleared by MD  PT eval of high complexity  pt c cont dizziness + orthostatic BP  also c 8/10 pain in head 2* migrane  presents c mobility deficits above functioning below baseline for mobility  needs to negotiate full flight of stairs + is alone during the day  Barriers to Discharge Inaccessible home environment;Decreased caregiver support   Goals   Patient Goals "to go home"     STG Expiration Date 10/14/17   Short Term Goal #1 1  increase strength 1/2 grade, 2  perform bed mobility (I), 3  safely perform transfers mod (I), 4  ambulate 300' (I) c stable BP to safely navigate home, 5  negotiate 14 stairs mod (I) c stable BP to access 2nd floor of home   Plan   Treatment/Interventions Functional transfer training;LE strengthening/ROM; Elevations; Therapeutic exercise; Endurance training;Patient/family training;Bed mobility;Gait training;Spoke to nursing   PT Frequency 5x/wk  (TIW-QD)   Recommendation   Recommendation Home with family support   PT - OK to Discharge No   Barthel Index   Feeding 10   Bathing 5   Grooming Score 5   Dressing Score 10   Bladder Score 10   Bowels Score 10   Toilet Use Score 10   Transfers (Bed/Chair) Score 10   Mobility (Level Surface) Score 0   Stairs Score 0   Barthel Index Score 70     Sabine Arora, PT

## 2017-10-09 NOTE — PLAN OF CARE
Problem: PHYSICAL THERAPY ADULT  Goal: Performs mobility at highest level of function for planned discharge setting  See evaluation for individualized goals  Treatment/Interventions: Functional transfer training, LE strengthening/ROM, Elevations, Therapeutic exercise, Endurance training, Patient/family training, Bed mobility, Gait training, Spoke to nursing          See flowsheet documentation for full assessment, interventions and recommendations  Prognosis: Good  Problem List: Decreased strength, Decreased endurance, Impaired balance, Decreased mobility, Impaired vision, Impaired sensation, Pain  Assessment: pt is a 55y/o f who presents to [facility] c migrane  PMH significant for bipolar, fibromyalgia, spinal stenosis, OA, TIAs + orthostatic hypotension  at baseline, pt (I) c functional mobility s AD  resides c spouse in 2 story home c 2 [address] + 14 steps to 2nd floor  admits to h/o falls in past 6 months 2* syncopal episodes + dizziness  currently presents c deficits in strength, balance, gait quality, pain, activity tolerance, sensation + vision noted in PT exam above  Barthel Index 70/100  ambulated distance of 40'x2 c (S), however limited by dizziness  orthostatic BPs as follows: supine 137/69, sitting 137/69, standing 109/68  would benefit from skilled PT to maximize functional mobility + return to PLOF  upon d/c, recommend pt return home c family support once medically cleared by MD  PT eval of high complexity  pt c cont dizziness + orthostatic BP  also c 8/10 pain in head 2* migrane  presents c mobility deficits above functioning below baseline for mobility  needs to negotiate full flight of stairs + is alone during the day  Barriers to Discharge: Inaccessible home environment, Decreased caregiver support     Recommendation: Home with family support     PT - OK to Discharge: No    See flowsheet documentation for full assessment

## 2017-10-09 NOTE — PROGRESS NOTES
Towards the end of second dose administration of dihydroergotamine pt's HR decreased to 40's-50's, but patient was asymptomatic  Will continue to monitor pt on telemetry

## 2017-10-09 NOTE — PROGRESS NOTES
[facility] Internal Medicine Progress Note  Patient: Salma Pearce 60 y o  female   MRN: [id number]  PCP: Janya Pearce MD  Unit/Bed#: E2 -51 Encounter: [id number]  Date Of Visit: 10/09/17    Assessment:    Principal Problem:    Migraine  Active Problems:    Bipolar depression ([facility] [address] )    Adrenal insufficiency (Andrés's disease) ([facility] )    Fibromyalgia    Status post gastrectomy    History of TIAs      Plan:    · Intractable complex migraine:  Initially presenting with pain in associated photophobia, phonophobia, blurry vision, right-sided numbness  Seen and evaluated by Neurology who felt stroke pathway could be discontinued and proceeded with DHE protocol given a previous tx that was beneficial     -On zonegran 50 mg daily for headache prophylaxis   -Per nursing staff, pt's HR dropped between 40 to 60's after administration of dihydroergotamine   -Pt admits to some relief since starting but migraine still present   -Neurology aware- await recommendations    · Right facial numbness: In the setting of a complex migraine  Stroke pathway canceled per Neurology  · Bipolar depression:  Outpatient regimen of Luvox 300 mg daily, lamotrigine 200 mg daily, Abilify 5 mg daily  According to patient, Abilify was increased to 10 mg by her psychiatrist recently  · Chronic pain:  On robaxin and gabapentin    · [place] disease: On Cortef 20 mg BID    · History of TIA many years ago  Does not recall specific events  On aspirin 325 mg daily    · Status post gastrectomy 2015    · Lower extremity swelling:  Initially taken off Lasix in May of 2017 however restarted by her PCP given increasing edema  Lasix has been initially held given history of orthostatic hypotension  · Anxiety:  Previous issues with polypharmacy in the past  Pt has reported multiple different frequencys of her ativan that she takes at home  So it is unclear as to waht she is actually taking  Ativan was continued but at a decreased dosage  Here she is currently receiving 0 5 mg q8hrs prn and 2 mg qhs  PDMP website quiried and medication verified  Ativan has been prescribed to her by Dr Avelino Higginbotham who is her psyciatrist     -17 Ativan 2 mg tablets-quantity 360    -17 Ativan 2 mg tablet- quantity 360    -17 Ativan 2 mg tablets-quantity 360    -16 Ativan 2 mg tablets-quantity 360    VTE Pharmacologic Prophylaxis:   Pharmacologic: Heparin  Mechanical VTE Prophylaxis in Place: Yes      Discussions with Specialists or Other Care Team Provider: Dr Austin Older     Education and Discussions with Family / Patient: pt    Time Spent for Care: 20 minutes  More than 50% of total time spent on counseling and coordination of care as described above  Current Length of Stay: 1 day(s)  Current Patient Status: Inpatient   Code Status: Level 1 - Full Code    Subjective:   Patient resting in bed  Notes some relief with DHE therapy however headache has returned with same associated symptoms consisting of blurry vision, photophobia, phonophobia, and generalized pattern  Patient reports right-sided sensation is slowly returning  She had an episode of looser stool earlier today but has had none further since  Denies any chest pain or pressure, palpitations, shortness of breath  Objective:     Vitals:   Temp (24hrs), Av 3 °F (36 8 °C), Min:97 2 °F (36 2 °C), Max:98 9 °F (37 2 °C)    HR:  [54-99] 67  Resp:  [16-18] 16  BP: (102-142)/(43-72) 109/68  SpO2:  [91 %-98 %] 98 %  Body mass index is 32 28 kg/m²  Input and Output Summary (last 24 hours): Intake/Output Summary (Last 24 hours) at 10/09/17 1352  Last data filed at 10/09/17 0920   Gross per 24 hour   Intake              405 ml   Output               50 ml   Net              355 ml       Physical Exam:     Physical Exam   Constitutional: She is oriented to person, place, and time  She appears well-developed and well-nourished  HENT:   Head: Normocephalic and atraumatic     Eyes: Conjunctivae are normal  Pupils are equal, round, and reactive to light  Cardiovascular: Normal rate, regular rhythm and normal heart sounds  Pulmonary/Chest: Effort normal and breath sounds normal  No respiratory distress  She has no wheezes  She has no rales  She exhibits no tenderness  Abdominal: Soft  Bowel sounds are normal  She exhibits no distension and no mass  There is no tenderness  There is no rebound and no guarding  Neurological: She is alert and oriented to person, place, and time  No cranial nerve deficit  Skin: Skin is warm and dry  Psychiatric: She has a normal mood and affect  Her behavior is normal    Nursing note and vitals reviewed  Additional Data:     Labs:      Results from last 7 days  Lab Units 10/09/17  0500 10/08/17  0740   WBC Thousand/uL  --  7 57   HEMOGLOBIN g/dL  --  12 4   HEMATOCRIT %  --  37 4   PLATELETS Thousands/uL 269 256   NEUTROS PCT %  --  68   LYMPHS PCT %  --  20   MONOS PCT %  --  8   EOS PCT %  --  3       Results from last 7 days  Lab Units 10/09/17  0500 10/08/17  0740   SODIUM mmol/L 144 143   POTASSIUM mmol/L 3 9 3 3*   CHLORIDE mmol/L 107 107   CO2 mmol/L 26 29   BUN mg/dL 12 12   CREATININE mg/dL 0 89 0 93   CALCIUM mg/dL 9 5 9 1   TOTAL PROTEIN g/dL  --  7 1   BILIRUBIN TOTAL mg/dL  --  0 24   ALK PHOS U/L  --  92   ALT U/L  --  22   AST U/L  --  21   GLUCOSE RANDOM mg/dL 134 110       Results from last 7 days  Lab Units 10/08/17  0740   INR  0 91       * I Have Reviewed All Lab Data Listed Above  * Additional Pertinent Lab Tests Reviewed:  [facility] Admission Reviewed    Imaging:    Imaging Reports Reviewed Today Include:   Imaging Personally Reviewed by Myself Includes:      Recent Cultures (last 7 days):           Last 24 Hours Medication List:     ARIPiprazole 10 mg Oral Daily   aspirin 325 mg Oral Daily   atorvastatin 40 mg Oral QPM   IVPB builder  Intravenous Q8H   fluvoxaMINE 300 mg Oral HS   gabapentin 600 mg Oral TID heparin (porcine) 5,000 Units Subcutaneous Q8H [facility]   hydrocortisone 20 mg Oral BID   lamoTRIgine 200 mg Oral Daily   ondansetron 4 mg Intravenous Q8H   zonisamide 50 mg Oral Daily        Today, Patient Was Seen By: Chan Cisneros PA-C    ** Please Note: This note has been constructed using a voice recognition system   **

## 2017-10-09 NOTE — PLAN OF CARE
Problem: PHYSICAL THERAPY ADULT  Goal: Performs mobility at highest level of function for planned discharge setting  See evaluation for individualized goals  Treatment/Interventions: Functional transfer training, LE strengthening/ROM, Elevations, Therapeutic exercise, Endurance training, Patient/family training, Bed mobility, Gait training, Spoke to nursing          See flowsheet documentation for full assessment, interventions and recommendations  Outcome: Progressing  Prognosis: Good  Problem List: Decreased strength, Decreased endurance, Impaired balance, Decreased mobility, Impaired vision, Impaired sensation, Pain  Assessment: pt agreed to ther ex education  performed supine/seated AROM ther ex B/L LE x10 reps c min verbal cues for technique  pt returned to supine at end of session 2* dizziness in sitting  will cont skilled PT to further maximize functional mobility + return to PLOF  upon d/c, recommend pt return home c family support once medically cleared by MD    Barriers to Discharge: Inaccessible home environment, Decreased caregiver support     Recommendation: Home with family support     PT - OK to Discharge: No    See flowsheet documentation for full assessment

## 2017-10-09 NOTE — PLAN OF CARE
Problem: Potential for Falls  Goal: Patient will remain free of falls  INTERVENTIONS:  - Assess patient frequently for physical needs  -  Identify cognitive and physical deficits and behaviors that affect risk of falls  -  [place] fall precautions as indicated by assessment   - Educate patient/family on patient safety including physical limitations  - Instruct patient to call for assistance with activity based on assessment  - Modify environment to reduce risk of injury  - Consider OT/PT consult to assist with strengthening/mobility   Outcome: Progressing      Problem: Neurological Deficit  Goal: Neurological status is stable or improving  Interventions:  - Monitor and assess patient's level of consciousness, motor function, sensory function, and level of assistance needed for ADLs  - Monitor and report changes from baseline  Collaborate with interdisciplinary team to initiate plan and implement interventions as ordered  - Provide and maintain a safe environment  - Utilize seizure precautions  - Utilize fall precautions  - Utilize aspiration precautions  - Utilize bleeding precautions  Outcome: Progressing      Problem: Activity Intolerance/Impaired Mobility  Goal: Mobility/activity is maintained at optimum level for patient  Interventions:  - Assess and monitor patient  barriers to mobility and need for assistive/adaptive devices  - Assess patient's emotional response to limitations  - Collaborate with interdisciplinary team and initiate plans and interventions as ordered  - Encourage independent activity per ability   - Maintain proper body alignment  - Perform active/passive rom as tolerated/ordered  - Plan activities to conserve energy   - Turn patient   Outcome: Progressing      Problem: Potential for Aspiration  Goal: Non-ventilated patient's risk of aspiration is minimized  Assess and monitor vital signs, respiratory status, and labs (WBC)    Monitor for signs of aspiration (tachypnea, cough, rales, wheezing, cyanosis, fever)  - Assess and monitor patient's ability to swallow  - Place patient up in chair to eat if possible  - HOB up at 90 degrees to eat if unable to get patient up into chair   - Supervise patient during oral intake  - Instruct patient to take small bites  - Instruct patient to take small single sips when taking liquids  - Follow patient-specific strategies generated by speech pathologist    Outcome: Progressing      Problem: Nutrition  Goal: Nutrition/Hydration status is improving  Monitor and assess patient's nutrition/hydration status for malnutrition (ex- brittle hair, bruises, dry skin, pale skin and conjunctiva, muscle wasting, smooth red tongue, and disorientation)  Collaborate with interdisciplinary team and initiate plan and interventions as ordered  Monitor patient's weight and dietary intake as ordered or per policy  Utilize nutrition screening tool and intervene per policy  Determine patient's food preferences and provide high-protein, high-caloric foods as appropriate  - Assist patient with eating   - Allow adequate time for meals   - Encourage patient to take dietary supplement as ordered  - Collaborate with clinical nutritionist   - Include patient/family/caregiver in decisions related to nutrition     Outcome: Progressing      Problem: PAIN - ADULT  Goal: Verbalizes/displays adequate comfort level or baseline comfort level  Interventions:  - Encourage patient to monitor pain and request assistance  - Assess pain using appropriate pain scale  - Administer analgesics based on type and severity of pain and evaluate response  - Implement non-pharmacological measures as appropriate and evaluate response  - Consider cultural and social influences on pain and pain management  - Notify physician/advanced practitioner if interventions unsuccessful or patient reports new pain   Outcome: Progressing      Problem: INFECTION - ADULT  Goal: Absence or prevention of progression during hospitalization  INTERVENTIONS:  - Assess and monitor for signs and symptoms of infection  - Monitor lab/diagnostic results  - Monitor all insertion sites, i e  indwelling lines, tubes, and drains  - Monitor endotracheal (as able) and nasal secretions for changes in amount and color  - [place] appropriate cooling/warming therapies per order  - Administer medications as ordered  - Instruct and encourage patient and family to use good hand hygiene technique  - Identify and instruct in appropriate isolation precautions for identified infection/condition   Outcome: Progressing      Problem: SAFETY ADULT  Goal: Maintain or return to baseline ADL function  INTERVENTIONS:  -  Assess patient's ability to carry out ADLs; assess patient's baseline for ADL function and identify physical deficits which impact ability to perform ADLs (bathing, care of mouth/teeth, toileting, grooming, dressing, etc )  - Assess/evaluate cause of self-care deficits   - Assess range of motion  - Assess patient's mobility; develop plan if impaired  - Assess patient's need for assistive devices and provide as appropriate  - Encourage maximum independence but intervene and supervise when necessary  ¯ Involve family in performance of ADLs  ¯ Assess for home care needs following discharge   ¯ Request OT consult to assist with ADL evaluation and planning for discharge  ¯ Provide patient education as appropriate   Outcome: Progressing    Goal: Maintain or return mobility status to optimal level  INTERVENTIONS:  - Assess patient's baseline mobility status (ambulation, transfers, stairs, etc )    - Identify cognitive and physical deficits and behaviors that affect mobility  - Identify mobility aids required to assist with transfers and/or ambulation (gait belt, sit-to-stand, lift, walker, cane, etc )  - [place] fall precautions as indicated by assessment  - Record patient progress and toleration of activity level on Mobility SBAR; progress patient to next Phase/Stage  - Instruct patient to call for assistance with activity based on assessment  - Request Rehabilitation consult to assist with strengthening/weightbearing, etc    Outcome: Progressing      Problem: DISCHARGE PLANNING  Goal: Discharge to home or other facility with appropriate resources  INTERVENTIONS:  - Identify barriers to discharge w/patient and caregiver  - Arrange for needed discharge resources and transportation as appropriate  - Identify discharge learning needs (meds, wound care, etc )  - Refer to Case Management Department for coordinating discharge planning if the patient needs post-hospital services based on physician/advanced practitioner order or complex needs related to functional status, cognitive ability, or social support system   Outcome: Progressing      Problem: Knowledge Deficit  Goal: Patient/family/caregiver demonstrates understanding of disease process, treatment plan, medications, and discharge instructions  Complete learning assessment and assess knowledge base  Interventions:  - Provide teaching at level of understanding  - Provide teaching via preferred learning methods   Outcome: Progressing      Problem: NEUROSENSORY - ADULT  Goal: Achieves stable or improved neurological status  INTERVENTIONS  - Monitor and report changes in neurological status  - Initiate measures to prevent increased intracranial pressure  - Maintain blood pressure and fluid volume within ordered parameters to optimize cerebral perfusion  - Monitor temperature, glucose, and sodium or any other associated labs   Initiate appropriate interventions as ordered  - Monitor for seizure activity   - Administer anti-seizure medications as ordered   Outcome: Progressing    Goal: Achieves maximal functionality and self care  INTERVENTIONS  - Monitor swallowing and airway patency with patient fatigue and changes in neurological status  - Encourage and assist patient to increase activity and self care with guidance from rehab services  - Encourage visually impaired, hearing impaired and aphasic patients to use assistive/communication devices   Outcome: Progressing      Problem: METABOLIC, FLUID AND ELECTROLYTES - ADULT  Goal: Electrolytes maintained within normal limits  INTERVENTIONS:  - Monitor labs and assess patient for signs and symptoms of electrolyte imbalances  - Administer electrolyte replacement as ordered  - Monitor response to electrolyte replacements, including repeat lab results as appropriate  - Instruct patient on fluid and nutrition as appropriate   Outcome: Progressing    Goal: Fluid balance maintained  INTERVENTIONS:  - Monitor labs and assess for signs and symptoms of volume excess or deficit  - Monitor I/O and WT  - Instruct patient on fluid and nutrition as appropriate   Outcome: Progressing      Problem: SKIN/TISSUE INTEGRITY - ADULT  Goal: Skin integrity remains intact  INTERVENTIONS  - Identify patients at risk for skin breakdown  - Assess and monitor skin integrity  - Assess and monitor nutrition and hydration status  - Monitor labs (i e  albumin)  - Assess for incontinence   - Turn and reposition patient  - Assist with mobility/ambulation  - Relieve pressure over bony prominences  - Avoid friction and shearing  - Provide appropriate hygiene as needed including keeping skin clean and dry  - Evaluate need for skin moisturizer/barrier cream  - Collaborate with interdisciplinary team (i e  Nutrition, Rehabilitation, etc )   - Patient/family teaching   Outcome: Progressing      Problem: MUSCULOSKELETAL - ADULT  Goal: Maintain or return mobility to safest level of function  INTERVENTIONS:  - Assess patient's ability to carry out ADLs; assess patient's baseline for ADL function and identify physical deficits which impact ability to perform ADLs (bathing, care of mouth/teeth, toileting, grooming, dressing, etc )  - Assess/evaluate cause of self-care deficits   - Assess range of motion  - Assess patient's mobility; develop plan if impaired  - Assess patient's need for assistive devices and provide as appropriate  - Encourage maximum independence but intervene and supervise when necessary  - Involve family in performance of ADLs  - Assess for home care needs following discharge   - Request OT consult to assist with ADL evaluation and planning for discharge  - Provide patient education as appropriate   Outcome: Progressing

## 2017-10-09 NOTE — PHYSICAL THERAPY NOTE
PT Progress Note (10min)  (7:29-2:39)       10/09/17 0739   Pain Assessment   Pain Assessment 0-10   Pain Score 8   Pain Type Acute pain   Pain Location Head   Pain Orientation Bilateral   Restrictions/Precautions   Other Precautions Contact/isolation; Fall Risk;Pain   General   Chart Reviewed Yes   Response to Previous Treatment Patient with no complaints from previous session  Family/Caregiver Present No   Cognition   Orientation Level Oriented X4   Subjective   Subjective pt agreeable to ther ex education  reports dizziness in sitting  Bed Mobility   Supine to Sit 5  Supervision   Additional items Verbal cues   Sit to Supine 5  Supervision   Additional items Verbal cues   Balance   Static Sitting Good   Dynamic Sitting Good   Activity Tolerance   Activity Tolerance Patient limited by fatigue;Patient limited by pain   Nurse [address]   Exercises   Quad Sets Supine;10 reps;AROM; Bilateral   Heelslides Sitting;10 reps;AROM; Bilateral   Hip Abduction Sitting;10 reps;AROM; Bilateral   Knee AROM Long Arc Quad Sitting;10 reps;AROM; Bilateral   Ankle Pumps Supine;10 reps;AROM; Bilateral   Marching Sitting;10 reps;AROM; Bilateral   Assessment   Prognosis Good   Problem List Decreased strength;Decreased endurance; Impaired balance;Decreased mobility; Impaired vision; Impaired sensation;Pain   Assessment pt agreed to ther ex education  performed supine/seated AROM ther ex B/L LE x10 reps c min verbal cues for technique  pt returned to supine at end of session 2* dizziness in sitting  will cont skilled PT to further maximize functional mobility + return to PLOF  upon d/c, recommend pt return home c family support once medically cleared by MD     Barriers to Discharge Inaccessible home environment;Decreased caregiver support   Goals   Patient Goals "to go home"  STG Expiration Date 10/14/17   Treatment Day 1   Plan   Treatment/Interventions Functional transfer training;LE strengthening/ROM; Elevations; Therapeutic exercise; Endurance training;Patient/family training;Bed mobility;Gait training;Spoke to nursing   Progress Progressing toward goals   PT Frequency 5x/wk  (TIW-QD)   Recommendation   Recommendation Home with family support   PT - OK to Discharge Anna Melendez PT

## 2017-10-09 NOTE — CASE MANAGEMENT
Notification of Inpatient Admission/Inpatient Authorization Request  This is a Notification of Inpatient Admission/Request for Inpatient Authorization to our facility [address]  Please be advised that this patient is currently in our facility under Inpatient Status  Below you will find the Attending Physician and Facilitys information including NPI# and contact information for the Utilization  assigned to the [facility] where the patient is receiving services  Please feel free to contact the Utilization Review Department with any questions  Patient Information:  PATIENT NAME: Kavitha Moffett  MRN: [id number]  YOB: 1960    PRESENTATION DATE: 10/8/2017  6:44 AM  IP ADMISSION DATE: 10/8/17 1007  DISCHARGE DATE: No discharge date for patient encounter  DISPOSITION: Home/Self Care    Attending Physician:    HOWARD Foster  Specialty- Hospitalist  Medicare Number-   Medicaid Number -   UPIN Number -   National Practioner ID- [id number]     Primary Office:  [facility] [employer] [facility], [facility] [address]   Phone 1: [phone]  Phone 2:   Fax: [phone]           Facility:  [facility]  [address] [address]  [phone]  Tax ID: [id number]  NPI: [id number]    [address] [facility] in the [employer] [place] for 2017  Network Utilization Review Department  Phone: [phone]; Fax [phone]  ATTENTION: The Network Utilization Review Department is now centralized for our 7 Facilities  Make a note that we have a new phone and fax numbers for our Department  Please call with any questions or concerns to [phone] and carefully follow the prompts so that you are directed to the right person  All voicemails are confidential  Fax any determinations, approvals, denials, and requests for initial or continue stay review clinical to [phone]   Due to HIGH CALL volume, it would be easier if you could please send faxed requests to expedite your requests and in part, help us provide discharge notifications faster

## 2017-10-09 NOTE — PLAN OF CARE
PAIN - ADULT     Verbalizes/displays adequate comfort level or baseline comfort level Not Progressing          Activity Intolerance/Impaired Mobility     Mobility/activity is maintained at optimum level for patient [address] Discharge to home or other facility with appropriate resources Progressing        INFECTION - ADULT     Absence or prevention of progression during hospitalization Progressing        Knowledge Deficit     Patient/family/caregiver demonstrates understanding of disease process, treatment plan, medications, and discharge instructions Progressing        METABOLIC, FLUID AND ELECTROLYTES - ADULT     Electrolytes maintained within normal limits Progressing     Fluid balance maintained Progressing        MUSCULOSKELETAL - ADULT     Maintain or return mobility to safest level of function Progressing        Neurological Deficit     Neurological status is stable or improving Progressing        NEUROSENSORY - ADULT     Achieves stable or improved neurological status Progressing     Achieves maximal functionality and self care Progressing        Nutrition     Nutrition/Hydration status is improving Progressing        Potential for Aspiration     Non-ventilated patient's risk of aspiration is minimized Progressing        Potential for Falls     Patient will remain free of falls Progressing        SAFETY ADULT     Maintain or return to baseline ADL function Progressing     Maintain or return mobility status to optimal level Progressing        SKIN/TISSUE INTEGRITY - ADULT     Skin integrity remains intact Progressing

## 2017-10-09 NOTE — OCCUPATIONAL THERAPY NOTE
OccupationalTherapy Evaluation(time=5751-6061)     Patient Name: Anthony COOPER Date: 10/9/2017  Problem List  Patient Active Problem List   Diagnosis    [facility]    Adrenal insufficiency (Gordon's disease) ([facility] )    Fibromyalgia    Status post gastrectomy    Spinal stenosis of lumbar region    Osteoarthritis of lumbosacral spine without myelopathy    HLD (hyperlipidemia)    Hypertension    History of TIAs    Migraine     Past Medical History  Past Medical History:   Diagnosis Date    Adrenal insufficiency (Andrés's disease) [facility]     Bipolar disorder     Cervical radiculopathy     Chronic back pain     DVT, lower extremity ([facility] ) 1991    Fibromyalgia     History of TIAs     cannot remember details    Hypertension     Hypokalemia     Migraine     Psychiatric disorder     Anxiety, major depression, bipolar    Spinal stenosis     Syncope 2014    orthostatic hypotension     Past Surgical History  Past Surgical History:   Procedure Laterality Date    APPENDECTOMY      GASTRIC RESTRICTION SURGERY      Gastric Sleeve Nov 2015    HYSTERECTOMY      JOINT REPLACEMENT      Left Knee 2011 and Right Hip 2010           10/09/17 1200   Note Type   Note type Eval only   Restrictions/Precautions   Other Precautions Fall Risk;Pain   Pain Assessment   Pain Assessment 0-10   Pain Score 8   Pain Type Acute pain   Pain Location Head   Pain Orientation Bilateral   Home Living   Type of [address] Two level  (b/b on 2nd(14steps), 2 atul)   Prior Function   Lives With Spouse   Lifestyle   Autonomy PTA pt states independence with all aspects of her ADLs, transfers, ambulation--w/o device; +home alone, neg , +MMVEW=7-93   Reciprocal Relationships 2 children   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   [address] owns a dog   Psychosocial   Psychosocial (WDL) WDL   Subjective   Subjective "I fall because I black-out "   ADL   Where Assessed Edge of bed Eating Assistance 6  Modified independent   Grooming Assistance 6  Modified Independent   UB Bathing Assistance 5  Supervision/Setup   LB Bathing Assistance 5  Supervision/Setup   UB Dressing Assistance 5  Supervision/Setup   LB Dressing Assistance 5  Supervision/Setup   Bed Mobility   Rolling R 6  Modified independent   Rolling L 6  Modified independent   Supine to Sit 6  Modified independent   Transfers   Sit to Stand 5  Supervision   Stand to Sit 5  Supervision   Functional Mobility   Functional Mobility 5  Supervision   Additional items (w/o device)   Balance   Static Sitting Normal   Dynamic Sitting Good   Static Standing Fair +   Dynamic Standing Fair   Activity Tolerance   Activity Tolerance Patient limited by fatigue;Patient limited by pain   Medical Staff Made Aware nsg-[email or web address]   RUE Assessment   RUE Assessment WFL   RUE Strength   RUE Overall Strength Within Functional Limits - able to perform ADL tasks with strength   LUE Assessment   LUE Assessment WFL   LUE Strength   LUE Overall Strength Within Functional Limits - able to perform ADL tasks with strength   Hand Function   Gross Motor Coordination Functional   Fine Motor Coordination Functional   Sensation   Light Touch No apparent deficits   Proprioception   Proprioception No apparent deficits   Vision-Basic Assessment   Current Vision (glasses)   Patient Visual Report Diplopia   Vision - Complex Assessment   Acuity Able to read clock/calendar on wall without difficulty   Perception   Inattention/Neglect Appears intact   Cognition   Overall Cognitive Status WFL   Arousal/Participation Alert   Attention Within functional limits   Orientation Level Oriented X4   Memory Within functional limits   Following Commands Follows all commands and directions without difficulty   Assessment   Limitation Decreased endurance   Prognosis Good   Assessment Pt is a 55y/o female admitted to the hospital 2* symptoms of blurry vision, HA, N/V, R sided numbness, and double vision  PTA pt states independence with all aspects of her ADLs, transfers, ambulation--w/o device; +home alone, neg , +DLAGG=2-62  During initial eval, pt demonstrated slight deficits with her functional balance, functional mobility, and activity tolerance  Pt was able to demonstrate good ADL status and states no concerns about going directly home  Pt would benefit from continued P T  to improve her overall endurance, balance, and mobility  Acute OT tx not indicated at this time 2* limited ADL deficits      Goals   Patient Goals "to get rid of this headache "   Plan   OT Frequency Eval only   Recommendation   Discharge Recommendation (continue P T  )   Barthel Index   Feeding 10   Bathing 5   Grooming Score 5   Dressing Score 10   Bladder Score 10   Bowels Score 10   Toilet Use Score 10   Transfers (Bed/Chair) Score 10   Mobility (Level Surface) Score 0   Stairs Score 0   Barthel Index Score 70   Charly Robledo, OT

## 2017-10-09 NOTE — PROGRESS NOTES
Progress Note - Neurology   Donna Larsen 60 y o  female MRN: [id number]  Unit/Bed#: E2 -01 Encounter: [id number]    Assessment/Plan:  76-year-old woman with extensive past medical history including chronic migraine headaches, [place]'s disease, bipolar, hypertension, DVT, orthostatic hypertension and polypharmacy who we are following for worsening migraine headaches  - orthostatic vital signs were positive this a m  and afternoon  - she has tried numerous medications in the past with little relief  - this admission she has had 5 doses of DHE again with little/temporary relief, but with associated transient and asymptomatic bradycardia  - also Benadryl, Solu-Medrol 500 mg once, Reglan, Zofran, Robaxin, lorazepam, Tylenol, and Depacon 1000 mg IV once  - patient already receives zonogram 50 mg daily, Lamictal 200 mg daily, gabapentin 600 mg t i d  at home  - head imaging was unremarkable  - would continue her current home medications  - will adjust headache regimen to consist of  Reglan, promethazine, and Depakote all scheduled orders  - orthostatic hypotension-  will start Florinef 0 1 mg daily  Monitor daily sodiums and potasium  (Florinef is a potent mineralocorticoid, as opposed to hydrocortisone which is a week mineralocorticoid)    Subjective:   "I still have headache"    ROS:  Review of Systems   Constitutional: Negative  HENT: Negative for hearing loss  Eyes:  Positive for photophobia and visual disturbance (diplopia)  Respiratory: Negative for wheezing  Cardiovascular: Negative for chest pain and palpitations  Genitourinary: Negative for dysuria and urgency  Neurological: Negative for dizziness, weakness, light-headedness,   Positive for subjective right leg numbness and headaches  All other systems reviewed are negative  Vitals: Blood pressure 106/63, pulse 63, temperature 97 8 °F (36 6 °C), temperature source Tympanic, resp   rate 16, height 5' 8" (1 727 m), weight 96 3 kg (212 lb 4 9 oz), SpO2 95 %  ,Body mass index is 32 28 kg/m²  Physical Exam:   Physical Exam   Constitutional: oriented to person, place, and time  appears well-developed and well-nourished  HENT:  Unremarkable  Head: Normocephalic and atraumatic  Eyes: EOM are normal  Pupils are equal, round, and reactive to light  Cardiovascular: Normal rate, regular rhythm and normal heart sounds     No murmur heard  Pulmonary/Chest: Breath sounds normal     Neurologic Exam   Mental Status    Oriented to person, place, and time  Level of consciousness: alert  Normal comprehension  Cranial Nerves    Visual fields full to confrontation  Pupils are equal, round, and reactive to light  Extraocular motions are normal    Nystagmus: none   Face is symmetric with respect to motor and sensory  Tongue, palate, uvula midline  Motor Exam   5/5 strength in all four extremities  Muscle bulk: normal  Overall muscle tone: normal  Sensory Exam   Sensation decreased to all modalities in the right lower extremity  Gait, Coordination, and Reflexes   DTR's symmetric  No gross ataxia  Lab, Imaging and other studies: I have personally reviewed pertinent reports      VTE Prophylaxis: Sequential compression device (Venodyne)

## 2017-10-09 NOTE — CASE MANAGEMENT
Initial Clinical Review    Admission: Date/Time/Statement: 10/8/17 @ 1007     Orders Placed This Encounter   Procedures    Inpatient Admission (expected length of stay for this patient is greater than two midnights)     Standing Status:   Standing     Number of Occurrences:   1     Order Specific Question:   Admitting Physician     Answer:   Long Cha     Order Specific Question:   Level of Care     Answer:   Med Surg [16]     Order Specific Question:   Estimated length of stay     Answer:   More than 2 Midnights     Order Specific Question:   Certification     Answer:   I certify that inpatient services are medically necessary for this patient for a duration of greater than two midnights  See H&P and MD Progress Notes for additional information about the patient's course of treatment  ED: Date/Time/Mode of Arrival:   ED Arrival Information     Expected Arrival Acuity Means of Arrival Escorted By Service Admission Type    - 10/8/2017 06:38 Urgent Walk-In Self General Medicine Urgent    Arrival Complaint    Headache          Chief Complaint:   Chief Complaint   Patient presents with    Headache     H/A, light sensitivity,  and n/v that started two days ago  Hx of migraines  History of Illness:    Madan Owens is a 60 y o  female with a PMH of bipolar disorder, major depression, 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, fibromyalgia, and history of gastrectomy 2015       She presents with a generalized headache that started three days ago around noon with gradual onset  Patient describes her headache as generalized involving her entire head and with sharp, gnawing, aching pain  She is sensitive to light and sound  Admits to blurry vision to both eyes  Associated nausea and vomiting at home x6  Headache has been constant despite trialing Tylenol and Excedrin at home  It has progressively worsened since onset which prompted patient to seek medical attention    The this is typical of her normal migraines however describes it as the worst one ever  Last migraine was approximately 2 months ago  Last saw her neurologist back in June of 2017 at [address]  She was placed on Zonegran 50 mg daily at bedtime      She also has associated right-sided numbness to her entire body including her face, upper extremity and lower extremity  Denies any tingling anywhere  She does have a history of TIAs a few years ago but unable to recall details of what she experienced at that time  Denies any slurred speech, facial droop, confusion, weakness to one extremity greater than the other       Does not smoke, drink or use other drugs  Denies any chest pain or pressure, palpitations, abdominal pain, fevers or chills, dysuria, diarrhea or constipation      ED Vital Signs:   ED Triage Vitals   Temperature Pulse Respirations Blood Pressure SpO2   10/08/17 0647 10/08/17 0647 10/08/17 0647 10/08/17 0647 10/08/17 0647   98 °F (36 7 °C) 75 18 143/64 96 %      Temp Source Heart Rate Source Patient Position - Orthostatic VS BP Location FiO2 (%)   10/08/17 0647 10/08/17 0801 10/08/17 1212 10/08/17 1212 --   Oral Monitor Sitting Left arm       Pain Score       10/08/17 0647       Worst Possible Pain        Wt Readings from Last 1 Encounters:   10/08/17 96 3 kg (212 lb 4 9 oz)       Vital Signs (abnormal):    above    Abnormal Labs/Diagnostic Test Results:    K  3 3  Ct  Head;  No acute intracranial abnormality    ED Treatment:   Medication Administration from 10/08/2017 0638 to 10/08/2017 1110       Date/Time Order Dose Route Action Action by Comments     10/08/2017 1010 sodium chloride 0 9 % bolus 1,000 mL 0 mL Intravenous Stopped Kayden Lynn RN      10/08/2017 0752 sodium chloride 0 9 % bolus 1,000 mL 1,000 mL Intravenous [address], RN      10/08/2017 0754 metoclopramide (REGLAN) injection 10 mg 10 mg Intravenous Given Kayden Lynn RN      10/08/2017 0753 diphenhydrAMINE (BENADRYL) injection 25 mg 25 mg Intravenous Given iLndsay Mckenna RN      10/08/2017 0900 magnesium sulfate 2 g/50 mL IVPB (premix) 2 g 0 g Intravenous Stopped Lindsay Mckenna RN      10/08/2017 2026 magnesium sulfate 2 g/50 mL IVPB (premix) 2 g 2 g Intravenous New Bag Lindsay Mckenna, [facility]      10/08/2017 0800 methylPREDNISolone sodium succinate (Solu-MEDROL) injection 500 mg 500 mg Intravenous Given Lindsay Mckenna RN      10/08/2017 1102 valproate (DEPACON) 1,000 mg in sodium chloride 0 9 % 50 mL for headache 0 mg Intravenous Stopped Lindsay Mckenna RN      10/08/2017 1026 valproate (DEPACON) 1,000 mg in sodium chloride 0 9 % 50 mL for headache 1,000 mg Intravenous New Bag Lindsay Mckenna RN           Past Medical/Surgical History: Active Ambulatory Problems     Diagnosis Date Noted    [facility] depression [facility] )     Adrenal insufficiency (Montgomery's disease) ([facility])     Fibromyalgia     Status post gastrectomy 04/08/2016    Spinal stenosis of lumbar region 05/05/2015    Osteoarthritis of lumbosacral spine without myelopathy 09/08/2015    HLD (hyperlipidemia) 05/28/2017    Hypertension      Resolved Ambulatory Problems     Diagnosis Date Noted    Hypokalemia     Vertigo 07/21/2016    Hypotension (arterial) 07/22/2016    Migraine 05/28/2017    Hemiparesthesia 05/28/2017    Syncope and collapse 07/29/2017     Past Medical History:   Diagnosis Date    Adrenal insufficiency (Montgomery's disease) ([facility] )     Bipolar disorder     Cervical radiculopathy     Chronic back pain     DVT, lower extremity ([facility] ) 1991    Fibromyalgia     History of TIAs     Hypertension     Hypokalemia     Migraine     Psychiatric disorder     Spinal stenosis     Syncope 2014       Admitting Diagnosis: Migraine [G43 909]  Headache [R51]    Age/Sex: 60 y o  female    · Assessment/Plan:   Intractable migraine  ? Generalized with gradual onset, associated photophobia and phonophobia, blurry vision, right sided numbness    ? CT of head without contrast reveals no acute intracranial abnormality, mass, midline shift, hemorrhage or infarction  ? Follows with Neurology at [facility] and takes zonagran 50 mg daily for migraine prophylaxis  However thinks this is not working  ? Reports last migraine being about 2 months ago  ? Received Reglan 10 mg, Benadryl 25 mg, magnesium sulfate 2 g, Solu-Medrol 500 mg, and Depakote 1000 mg in ED  ? Urine drug screen negative  ? Consult Neurology for further recommendations and input      Additional Problems:   · Right facial numbness: In the setting of a complex migraine however must still rule out stroke  Patient already on aspirin 325 mg daily but did not take today  Will provide here  Will obtain lipid panel and place on statin in the meantime  Obtain hemoglobin A1c   CT of head with no acute intracranial abnormality  Will defer further imaging such as MRI and carotid ultrasound to Neurology  Neuro checks and monitor on telemetry     · Hypokalemia:  Potassium 3 3  In the of daily Lasix use  Will replete and continue to monitor     · Bipolar depression:  Maintained on outpatient regimen of Luvox 100 mg daily, lamotrigine 200 mg daily, Abilify 5 mg daily  According to the patient, Abilify was increased to 10 mg by her psychiatrist recently      · Chronic pain: On robaxin and gabapentin  · Andrés disease: On cortef 20 mg twice daily      · History of TIA:  Many years ago   Does not recall specific events at that time  On aspirin 325 mg daily      · Status post gastrectomy 2015     · Lower extremity swelling:  According to last hospitalization to [facility] in May of 2017, patient was taken off her Lasix  However the she notes being restarted on the by her PCP given increase in edema in her legs  She takes 20 mg daily  Will hold at this time  Anticipated Length of Stay:  Patient will be admitted on an Inpatient basis with an anticipated length of stay of  Greater than 2 midnights   Justification for Hospital Stay: intractable migraine           Admission Orders:   IP   10/8  @    1007  Scheduled Meds:   ARIPiprazole 10 mg Oral Daily   aspirin 325 mg Oral Daily   atorvastatin 40 mg Oral QPM   IVPB builder  Intravenous Q8H   fluvoxaMINE 300 mg Oral HS   gabapentin 600 mg Oral TID   heparin (porcine) 5,000 Units Subcutaneous Q8H [address]   hydrocortisone 20 mg Oral BID   lamoTRIgine 200 mg Oral Daily   ondansetron 4 mg Intravenous Q8H   zonisamide 50 mg Oral Daily     Continuous Infusions:    PRN Meds:   acetaminophen    LORazepam    LORazepam    methocarbamol    ondansetron     Cardiac  Diet  Tele  Dysphagia  eval  Cons  Neuro  PT/OT/speech  Contact isolation  Bp  Q 4 hrs  Neuro checks  Q 4 hrs    [address] [place] [address] in the [facility] by Jesse Spain for 2017  Network Utilization Review Department  Phone: [phone]; Fax [phone]  ATTENTION: The Network Utilization Review Department is now centralized for our 7 Facilities  Make a note that we have a new phone and fax numbers for our Department  Please call with any questions or concerns to [phone] and carefully follow the prompts so that you are directed to the right person  All voicemails are confidential  Fax any determinations, approvals, denials, and requests for initial or continue stay review clinical to [phone]  Due to HIGH CALL volume, it would be easier if you could please send faxed requests to expedite your requests and in part, help us provide discharge notifications faster

## 2017-10-10 LAB
ANION GAP SERPL CALCULATED.3IONS-SCNC: 6 MMOL/L (ref 4–13)
BUN SERPL-MCNC: 17 MG/DL (ref 5–25)
CALCIUM SERPL-MCNC: 8.8 MG/DL (ref 8.3–10.1)
CHLORIDE SERPL-SCNC: 110 MMOL/L (ref 100–108)
CO2 SERPL-SCNC: 29 MMOL/L (ref 21–32)
CREAT SERPL-MCNC: 0.94 MG/DL (ref 0.6–1.3)
GFR SERPL CREATININE-BSD FRML MDRD: 67 ML/MIN/1.73SQ M
GLUCOSE SERPL-MCNC: 91 MG/DL (ref 65–140)
POTASSIUM SERPL-SCNC: 3.8 MMOL/L (ref 3.5–5.3)
SODIUM SERPL-SCNC: 145 MMOL/L (ref 136–145)

## 2017-10-10 PROCEDURE — 97530 THERAPEUTIC ACTIVITIES: CPT

## 2017-10-10 PROCEDURE — 97116 GAIT TRAINING THERAPY: CPT

## 2017-10-10 PROCEDURE — 80048 BASIC METABOLIC PNL TOTAL CA: CPT | Performed by: PSYCHIATRY & NEUROLOGY

## 2017-10-10 RX ORDER — SACCHAROMYCES BOULARDII 250 MG
250 CAPSULE ORAL 2 TIMES DAILY
Status: DISCONTINUED | OUTPATIENT
Start: 2017-10-10 | End: 2017-10-11 | Stop reason: HOSPADM

## 2017-10-10 RX ADMIN — Medication 250 MG: at 17:08

## 2017-10-10 RX ADMIN — ATORVASTATIN CALCIUM 40 MG: 40 TABLET, FILM COATED ORAL at 17:07

## 2017-10-10 RX ADMIN — METOCLOPRAMIDE 10 MG: 5 INJECTION, SOLUTION INTRAMUSCULAR; INTRAVENOUS at 07:08

## 2017-10-10 RX ADMIN — FLUVOXAMINE MALEATE 300 MG: 50 TABLET, FILM COATED ORAL at 22:13

## 2017-10-10 RX ADMIN — PROCHLORPERAZINE MALEATE 5 MG: 10 TABLET, FILM COATED ORAL at 22:23

## 2017-10-10 RX ADMIN — ZONISAMIDE 50 MG: 25 CAPSULE ORAL at 09:12

## 2017-10-10 RX ADMIN — DIHYDROERGOTAMINE MESYLATE: 1 INJECTION, SOLUTION INTRAMUSCULAR; INTRAVENOUS; SUBCUTANEOUS at 14:21

## 2017-10-10 RX ADMIN — HEPARIN SODIUM 5000 UNITS: 5000 INJECTION, SOLUTION INTRAVENOUS; SUBCUTANEOUS at 22:12

## 2017-10-10 RX ADMIN — METOCLOPRAMIDE 10 MG: 5 INJECTION, SOLUTION INTRAMUSCULAR; INTRAVENOUS at 01:26

## 2017-10-10 RX ADMIN — LORAZEPAM 0.5 MG: 0.5 TABLET ORAL at 17:07

## 2017-10-10 RX ADMIN — HEPARIN SODIUM 5000 UNITS: 5000 INJECTION, SOLUTION INTRAVENOUS; SUBCUTANEOUS at 13:06

## 2017-10-10 RX ADMIN — BUTALBITAL, ACETAMINOPHEN, AND CAFFEINE 1 TABLET: 50; 325; 40 TABLET ORAL at 12:44

## 2017-10-10 RX ADMIN — HEPARIN SODIUM 5000 UNITS: 5000 INJECTION, SOLUTION INTRAVENOUS; SUBCUTANEOUS at 05:02

## 2017-10-10 RX ADMIN — HYDROCORTISONE 20 MG: 10 TABLET ORAL at 17:07

## 2017-10-10 RX ADMIN — PROCHLORPERAZINE MALEATE 5 MG: 10 TABLET, FILM COATED ORAL at 22:13

## 2017-10-10 RX ADMIN — LORAZEPAM 2 MG: 1 TABLET ORAL at 22:21

## 2017-10-10 RX ADMIN — ONDANSETRON 4 MG: 2 INJECTION INTRAMUSCULAR; INTRAVENOUS at 11:55

## 2017-10-10 RX ADMIN — VALPROATE SODIUM 500 MG: 100 INJECTION, SOLUTION INTRAVENOUS at 12:44

## 2017-10-10 RX ADMIN — LORAZEPAM 0.5 MG: 0.5 TABLET ORAL at 09:14

## 2017-10-10 RX ADMIN — PROCHLORPERAZINE MALEATE 5 MG: 10 TABLET, FILM COATED ORAL at 11:51

## 2017-10-10 RX ADMIN — GABAPENTIN 600 MG: 300 CAPSULE ORAL at 17:07

## 2017-10-10 RX ADMIN — Medication 250 MG: at 11:51

## 2017-10-10 RX ADMIN — LAMOTRIGINE 200 MG: 100 TABLET ORAL at 09:11

## 2017-10-10 RX ADMIN — HYDROCORTISONE 20 MG: 10 TABLET ORAL at 09:12

## 2017-10-10 RX ADMIN — METOCLOPRAMIDE 10 MG: 5 INJECTION, SOLUTION INTRAMUSCULAR; INTRAVENOUS at 12:47

## 2017-10-10 RX ADMIN — GABAPENTIN 600 MG: 300 CAPSULE ORAL at 09:11

## 2017-10-10 RX ADMIN — ARIPIPRAZOLE 10 MG: 10 TABLET ORAL at 09:11

## 2017-10-10 RX ADMIN — VALPROATE SODIUM 500 MG: 100 INJECTION, SOLUTION INTRAVENOUS at 01:26

## 2017-10-10 RX ADMIN — BUTALBITAL, ACETAMINOPHEN, AND CAFFEINE 1 TABLET: 50; 325; 40 TABLET ORAL at 03:52

## 2017-10-10 RX ADMIN — PROCHLORPERAZINE MALEATE 5 MG: 10 TABLET, FILM COATED ORAL at 17:07

## 2017-10-10 RX ADMIN — ONDANSETRON 4 MG: 2 INJECTION INTRAMUSCULAR; INTRAVENOUS at 22:13

## 2017-10-10 RX ADMIN — ACETAMINOPHEN 650 MG: 325 TABLET ORAL at 10:10

## 2017-10-10 RX ADMIN — VALPROATE SODIUM 500 MG: 100 INJECTION, SOLUTION INTRAVENOUS at 07:08

## 2017-10-10 RX ADMIN — DIHYDROERGOTAMINE MESYLATE: 1 INJECTION, SOLUTION INTRAMUSCULAR; INTRAVENOUS; SUBCUTANEOUS at 05:35

## 2017-10-10 RX ADMIN — PROCHLORPERAZINE MALEATE 5 MG: 10 TABLET, FILM COATED ORAL at 05:03

## 2017-10-10 RX ADMIN — ASPIRIN 325 MG: 325 TABLET ORAL at 09:11

## 2017-10-10 RX ADMIN — ONDANSETRON 4 MG: 2 INJECTION INTRAMUSCULAR; INTRAVENOUS at 05:02

## 2017-10-10 RX ADMIN — GABAPENTIN 600 MG: 300 CAPSULE ORAL at 22:13

## 2017-10-10 RX ADMIN — METOCLOPRAMIDE 10 MG: 5 INJECTION, SOLUTION INTRAMUSCULAR; INTRAVENOUS at 19:13

## 2017-10-10 NOTE — PROGRESS NOTES
[facility] Internal Medicine Progress Note  Patient: Janett Lund 60 y o  female   MRN: [id number]  PCP: Amrit Dumont MD  Unit/Bed#: E2 -54 Encounter: [id number]  Date Of Visit: 10/10/17    Assessment:    Principal Problem:    Migraine  Active Problems:    B[facility] ([facility] )    Adrenal insufficiency (McIntosh's disease) ([facility] )    Fibromyalgia    Status post gastrectomy    History of TIAs      Plan:    · Intractable complex migraine:  Initially presenting with pain in associated photophobia, phonophobia, blurry vision, right-sided numbness  Seen and evaluated by Neurology who felt stroke pathway could be discontinued and proceeded with DHE protocol given a previous tx that was beneficial                    -On zonegran 50 mg daily for headache prophylaxis    -Had 5 doses of DHE with transient and asymptomatic , also with minimal relief    -Continue reglan, promethazine, and depakote per neurology     · Right facial numbness: In the setting of a complex migraine  Stroke pathway canceled per Neurology      · Bipolar depression:  Outpatient regimen of Luvox 300 mg daily, lamotrigine 200 mg daily, Abilify 5 mg daily  According to patient, Abilify was increased to 10 mg by her psychiatrist recently      · Chronic pain:  On robaxin and gabapentin     · McIntosh disease: On Cortef 20 mg BID     · History of TIA many years ago  Does not recall specific events  On aspirin 325 mg daily     · Status post gastrectomy 2015     · Lower extremity swelling:  Initially taken off Lasix in May of 2017 however restarted by her PCP given increasing edema  Lasix has been initially held given history of orthostatic hypotension  · Orthostatic hypotension: pt started on florinef 0 1 mg daily by neurology  · Diarrhea:  X4 episodes today  Liquid in nature    Will place on probiotics      · Anxiety:  Previous issues with polypharmacy in the past  Pt has reported multiple different frequencys of her ativan that she takes at home  So it is unclear as to waht she is actually taking  Ativan was continued but at a decreased dosage  Here she is currently receiving 0 5 mg q8hrs prn and 2 mg qhs  PDMP website quiried and medication verified  Ativan has been prescribed to her by Dr Dania Joshi who is her psyciatrist                                               -17 Ativan 2 mg tablets-quantity 360                                              -17 Ativan 2 mg tablet- quantity 360                                              -17 Ativan 2 mg tablets-quantity 360                                              -16 Ativan 2 mg tablets-quantity 360      VTE Pharmacologic Prophylaxis:   Pharmacologic: Heparin  Mechanical VTE Prophylaxis in Place: Yes    Discussions with Specialists or Other Care Team Provider: Dr Charlie Jang    Education and Discussions with Family / Patient: patient    Time Spent for Care: 20 minutes  More than 50% of total time spent on counseling and coordination of care as described above  Current Length of Stay: 2 day(s)  Current Patient Status: Inpatient   Code Status: Level 1 - Full Code    Subjective:   Patient resting in bed upon arrival   She states her headache has improved somewhat since yesterday  Improved sensation to right side  Still with mild phonophobia and photophobia  Will eating and drinking without difficulty however has had 4 episodes of diarrhea today that are liquid in nature  Objective:     Vitals:   Temp (24hrs), Av 7 °F (36 5 °C), Min:97 1 °F (36 2 °C), Max:98 3 °F (36 8 °C)    HR:  [51-76] 76  Resp:  [16-20] 16  BP: (106-162)/(61-83) 142/69  SpO2:  [94 %-98 %] 94 %  Body mass index is 32 28 kg/m²  Input and Output Summary (last 24 hours):     No intake or output data in the 24 hours ending 10/10/17 1049    Physical Exam:     Physical Exam   Constitutional: She is oriented to person, place, and time   She appears well-developed and well-nourished  No distress  HENT:   Head: Normocephalic and atraumatic  Eyes: Conjunctivae are normal    Cardiovascular: Normal rate, regular rhythm and normal heart sounds  Pulmonary/Chest: Effort normal and breath sounds normal  No respiratory distress  She has no wheezes  She has no rales  She exhibits no tenderness  Abdominal: Soft  Bowel sounds are normal  She exhibits no distension and no mass  There is no tenderness  There is no rebound and no guarding  Neurological: She is alert and oriented to person, place, and time  No cranial nerve deficit  Improved sensation to right side   Skin: Skin is warm and dry  She is not diaphoretic  Psychiatric: She has a normal mood and affect  Her behavior is normal  Judgment and thought content normal    Nursing note and vitals reviewed  Additional Data:     Labs:      Results from last 7 days  Lab Units 10/09/17  0500 10/08/17  0740   WBC Thousand/uL  --  7 57   HEMOGLOBIN g/dL  --  12 4   HEMATOCRIT %  --  37 4   PLATELETS Thousands/uL 269 256   NEUTROS PCT %  --  68   LYMPHS PCT %  --  20   MONOS PCT %  --  8   EOS PCT %  --  3       Results from last 7 days  Lab Units 10/10/17  0456  10/08/17  0740   SODIUM mmol/L 145  < > 143   POTASSIUM mmol/L 3 8  < > 3 3*   CHLORIDE mmol/L 110*  < > 107   CO2 mmol/L 29  < > 29   BUN mg/dL 17  < > 12   CREATININE mg/dL 0 94  < > 0 93   CALCIUM mg/dL 8 8  < > 9 1   TOTAL PROTEIN g/dL  --   --  7 1   BILIRUBIN TOTAL mg/dL  --   --  0 24   ALK PHOS U/L  --   --  92   ALT U/L  --   --  22   AST U/L  --   --  21   GLUCOSE RANDOM mg/dL 91  < > 110   < > = values in this interval not displayed  Results from last 7 days  Lab Units 10/08/17  0740   INR  0 91       * I Have Reviewed All Lab Data Listed Above  * Additional Pertinent Lab Tests Reviewed:  All Labs For Current Hospital Admission Reviewed    Imaging:    Imaging Reports Reviewed Today Include:   Imaging Personally Reviewed by Myself Includes:      Recent Cultures (last 7 days):           Last 24 Hours Medication List:     ARIPiprazole 10 mg Oral Daily   aspirin 325 mg Oral Daily   atorvastatin 40 mg Oral QPM   IVPB builder  Intravenous Q8H   fluvoxaMINE 300 mg Oral HS   gabapentin 600 mg Oral TID   heparin (porcine) 5,000 Units Subcutaneous Q8H [address]   hydrocortisone 20 mg Oral BID   lamoTRIgine 200 mg Oral Daily   metoclopromide 10 mg Intravenous Q6H KIRSTIE   ondansetron 4 mg Intravenous Q8H   prochlorperazine 5 mg Oral Q6H   valproate sodium 500 mg Intravenous Q6H   zonisamide 50 mg Oral Daily        Today, Patient Was Seen By: Manjeet Rouse PA-C    ** Please Note: This note has been constructed using a voice recognition system   **

## 2017-10-10 NOTE — PLAN OF CARE
Problem: PHYSICAL THERAPY ADULT  Goal: Performs mobility at highest level of function for planned discharge setting  See evaluation for individualized goals  Treatment/Interventions: Functional transfer training, LE strengthening/ROM, Elevations, Therapeutic exercise, Endurance training, Patient/family training, Bed mobility, Gait training, Spoke to nursing          See flowsheet documentation for full assessment, interventions and recommendations  Outcome: Progressing  Prognosis: Good  Problem List: Decreased range of motion, Decreased strength, Decreased endurance, Impaired balance, Decreased mobility, Impaired vision, Pain  Assessment: Pt  supine in bed upon my arrival  Able to complete all transfers practicing proper technique with no noted LOB  Progressed with increased amb  trial with use of no AD and standbyA of therapist  Performed stair trial practicing proper technique with no noted LOB  Reported dizziness after increased amb  trial, repositioned supine in bed with report of improvement of symptoms  Remained supine in bed at end of treatment session  Pt  will continue to progress with PT goals to ensure a safe d/c home with Home PT and family support as needed when medically stable  Barriers to Discharge: Decreased caregiver support  Barriers to Discharge Comments: Alone during the day  Recommendation: Home with family support, Home PT (Safety evaluation)     PT - OK to Discharge: No (Continued progression of PT goals )    See flowsheet documentation for full assessment

## 2017-10-10 NOTE — PHYSICAL THERAPY NOTE
Physical Therapy Progress Note     10/10/17 1557   Pain Assessment   Pain Assessment 0-10   Pain Score 7   Pain Type Acute pain   Pain Location Head   Pain Orientation Bilateral   Hospital Pain Intervention(s) Repositioned; Ambulation/increased activity   Response to Interventions Tolerated  Restrictions/Precautions   Other Precautions Contact/isolation; Fall Risk;Pain   General   Chart Reviewed Yes   Response to Previous Treatment Patient with no complaints from previous session  Family/Caregiver Present No   Subjective   Subjective Willing to participate in therapy this PM    Bed Mobility   Supine to Sit 6  Modified independent   Additional items Bedrails   Sit to Supine 6  Modified independent   Additional items Bedrails   Transfers   Sit to Stand 5  Supervision   Additional items Assist x 1;Bedrails; Increased time required;Verbal cues   Stand to Sit 5  Supervision   Additional items Assist x 1;Bedrails; Increased time required;Verbal cues   Ambulation/Elevation   Gait pattern Narrow ZEESHAN; Decreased foot clearance   Gait Assistance 5  Supervision   Additional items Assist x 1;Verbal cues; Tactile cues   Assistive Device None   Distance 80' x 2   Stair Management Assistance 5  Supervision   Additional items Assist x 1;Verbal cues; Tactile cues   Stair Management Technique One rail R;Step to pattern; Foreward;Reciprocal   Number of Stairs 10   Balance   Static Sitting Normal   Dynamic Sitting Good   Static Standing Fair +   Dynamic Standing Fair   Ambulatory Fair   Endurance Deficit   Endurance Deficit Yes   Endurance Deficit Description Pain   Activity Tolerance   Activity Tolerance Patient limited by pain   Nurse Made Aware Yes   Assessment   Prognosis Good   Problem List Decreased range of motion;Decreased strength;Decreased endurance; Impaired balance;Decreased mobility; Impaired vision;Pain   Assessment Pt  supine in bed upon my arrival  Able to complete all transfers practicing proper technique with no noted LOB  Progressed with increased amb  trial with use of no AD and standbyA of therapist  Performed stair trial practicing proper technique with no noted LOB  Reported dizziness after increased amb  trial, repositioned supine in bed with report of improvement of symptoms  Remained supine in bed at end of treatment session  Pt  will continue to progress with PT goals to ensure a safe d/c home with Home PT and family support as needed when medically stable  Barriers to Discharge Decreased caregiver support   Barriers to Discharge Comments Alone during the day  Goals   Patient Goals To rest    STG Expiration Date 10/14/17   Treatment Day 2   Plan   Treatment/Interventions Functional transfer training;LE strengthening/ROM; Endurance training;Bed mobility;Gait training;Elevations; Spoke to nursing;Spoke to case management   Progress Slow progress, decreased activity tolerance   PT Frequency 5x/wk  (TIW_QD)   Recommendation   Recommendation Home with family support;Home PT  (Safety evaluation)   Equipment Recommended Other (Comment)  (monitor)   PT - OK to Discharge No  (Continued progression of PT goals )     Michael Long, PTA

## 2017-10-11 VITALS
RESPIRATION RATE: 16 BRPM | SYSTOLIC BLOOD PRESSURE: 105 MMHG | HEART RATE: 71 BPM | OXYGEN SATURATION: 96 % | HEIGHT: 68 IN | WEIGHT: 212.3 LBS | TEMPERATURE: 97.5 F | DIASTOLIC BLOOD PRESSURE: 53 MMHG | BODY MASS INDEX: 32.18 KG/M2

## 2017-10-11 LAB
ANION GAP SERPL CALCULATED.3IONS-SCNC: 3 MMOL/L (ref 4–13)
BUN SERPL-MCNC: 14 MG/DL (ref 5–25)
CALCIUM SERPL-MCNC: 8.8 MG/DL (ref 8.3–10.1)
CHLORIDE SERPL-SCNC: 112 MMOL/L (ref 100–108)
CO2 SERPL-SCNC: 29 MMOL/L (ref 21–32)
CREAT SERPL-MCNC: 0.93 MG/DL (ref 0.6–1.3)
GFR SERPL CREATININE-BSD FRML MDRD: 68 ML/MIN/1.73SQ M
GLUCOSE SERPL-MCNC: 87 MG/DL (ref 65–140)
POTASSIUM SERPL-SCNC: 3.8 MMOL/L (ref 3.5–5.3)
SODIUM SERPL-SCNC: 144 MMOL/L (ref 136–145)

## 2017-10-11 PROCEDURE — 80048 BASIC METABOLIC PNL TOTAL CA: CPT | Performed by: PSYCHIATRY & NEUROLOGY

## 2017-10-11 RX ORDER — ATORVASTATIN CALCIUM 40 MG/1
40 TABLET, FILM COATED ORAL EVERY EVENING
Qty: 30 TABLET | Refills: 0 | Status: SHIPPED | OUTPATIENT
Start: 2017-10-11 | End: 2017-12-21

## 2017-10-11 RX ORDER — DIVALPROEX SODIUM 250 MG/1
250 TABLET, EXTENDED RELEASE ORAL
Status: DISCONTINUED | OUTPATIENT
Start: 2017-10-11 | End: 2017-10-11 | Stop reason: HOSPADM

## 2017-10-11 RX ORDER — DIVALPROEX SODIUM 250 MG/1
250 TABLET, EXTENDED RELEASE ORAL
Qty: 15 TABLET | Refills: 0 | Status: SHIPPED | OUTPATIENT
Start: 2017-10-11 | End: 2017-12-21

## 2017-10-11 RX ADMIN — HYDROCORTISONE 20 MG: 10 TABLET ORAL at 08:15

## 2017-10-11 RX ADMIN — ONDANSETRON 4 MG: 2 INJECTION INTRAMUSCULAR; INTRAVENOUS at 12:33

## 2017-10-11 RX ADMIN — ONDANSETRON 4 MG: 2 INJECTION INTRAMUSCULAR; INTRAVENOUS at 05:39

## 2017-10-11 RX ADMIN — LAMOTRIGINE 200 MG: 100 TABLET ORAL at 08:14

## 2017-10-11 RX ADMIN — PROCHLORPERAZINE MALEATE 5 MG: 10 TABLET, FILM COATED ORAL at 11:14

## 2017-10-11 RX ADMIN — BUTALBITAL, ACETAMINOPHEN, AND CAFFEINE 1 TABLET: 50; 325; 40 TABLET ORAL at 12:33

## 2017-10-11 RX ADMIN — ARIPIPRAZOLE 10 MG: 10 TABLET ORAL at 08:14

## 2017-10-11 RX ADMIN — Medication 250 MG: at 08:14

## 2017-10-11 RX ADMIN — PROCHLORPERAZINE MALEATE 5 MG: 10 TABLET, FILM COATED ORAL at 05:38

## 2017-10-11 RX ADMIN — BUTALBITAL, ACETAMINOPHEN, AND CAFFEINE 1 TABLET: 50; 325; 40 TABLET ORAL at 16:13

## 2017-10-11 RX ADMIN — LORAZEPAM 0.5 MG: 0.5 TABLET ORAL at 08:32

## 2017-10-11 RX ADMIN — GABAPENTIN 600 MG: 300 CAPSULE ORAL at 16:13

## 2017-10-11 RX ADMIN — ASPIRIN 325 MG: 325 TABLET ORAL at 08:14

## 2017-10-11 RX ADMIN — HEPARIN SODIUM 5000 UNITS: 5000 INJECTION, SOLUTION INTRAVENOUS; SUBCUTANEOUS at 05:38

## 2017-10-11 RX ADMIN — BUTALBITAL, ACETAMINOPHEN, AND CAFFEINE 1 TABLET: 50; 325; 40 TABLET ORAL at 08:27

## 2017-10-11 RX ADMIN — LORAZEPAM 0.5 MG: 0.5 TABLET ORAL at 16:13

## 2017-10-11 RX ADMIN — GABAPENTIN 600 MG: 300 CAPSULE ORAL at 08:14

## 2017-10-11 RX ADMIN — HEPARIN SODIUM 5000 UNITS: 5000 INJECTION, SOLUTION INTRAVENOUS; SUBCUTANEOUS at 14:32

## 2017-10-11 RX ADMIN — METOCLOPRAMIDE 10 MG: 5 INJECTION, SOLUTION INTRAMUSCULAR; INTRAVENOUS at 08:14

## 2017-10-11 RX ADMIN — ACETAMINOPHEN 650 MG: 325 TABLET ORAL at 04:16

## 2017-10-11 RX ADMIN — ZONISAMIDE 50 MG: 25 CAPSULE ORAL at 08:15

## 2017-10-11 RX ADMIN — ACETAMINOPHEN 650 MG: 325 TABLET ORAL at 08:26

## 2017-10-11 NOTE — PROGRESS NOTES
Progress Note - Carlyn Munoz 60 y o  female MRN: [id number]    Unit/Bed#: E2 -01 Encounter: [id number]    Assessment/Plan:    Intractable complex migraine slowly improving with  Neurology recommendation       Discussed with Neuro meds to continue home    Dyslipidemia    continue statin for LDL control    Hypertension    orthostatic hypotension position change slowly    Bipolar     mood stable with current meds    Andrés    continue hydrocortisone      Subjective:   Headache improving able to tolerate diet denies chest pain shortness of breath nausea vomiting diarrhea no fevers chills appetite good     Objective:     Vitals: Blood pressure 132/68, pulse (!) 48, temperature 97 9 °F (36 6 °C), temperature source Tympanic, resp  rate 16, height 5' 8" (1 727 m), weight 96 3 kg (212 lb 4 9 oz), SpO2 98 %  ,Body mass index is 32 28 kg/m²  Results from last 7 days  Lab Units 10/09/17  0500 10/08/17  0740   WBC Thousand/uL  --  7 57   HEMOGLOBIN g/dL  --  12 4   HEMATOCRIT %  --  37 4   PLATELETS Thousands/uL 269 256   INR   --  0 91       Results from last 7 days  Lab Units 10/11/17  0543  10/08/17  0740   SODIUM mmol/L 144  < > 143   POTASSIUM mmol/L 3 8  < > 3 3*   CHLORIDE mmol/L 112*  < > 107   CO2 mmol/L 29  < > 29   BUN mg/dL 14  < > 12   CREATININE mg/dL 0 93  < > 0 93   CALCIUM mg/dL 8 8  < > 9 1   TOTAL PROTEIN g/dL  --   --  7 1   BILIRUBIN TOTAL mg/dL  --   --  0 24   ALK PHOS U/L  --   --  92   ALT U/L  --   --  22   AST U/L  --   --  21   GLUCOSE RANDOM mg/dL 87  < > 110   < > = values in this interval not displayed      Scheduled Meds:    ARIPiprazole 10 mg Oral Daily   aspirin 325 mg Oral Daily   atorvastatin 40 mg Oral QPM   fluvoxaMINE 300 mg Oral HS   gabapentin 600 mg Oral TID   heparin (porcine) 5,000 Units Subcutaneous Q8H [address]   hydrocortisone 20 mg Oral BID   lamoTRIgine 200 mg Oral Daily   metoclopromide 10 mg Intravenous Q6H KIRSTIE   ondansetron 4 mg Intravenous Q8H   prochlorperazine 5 mg Oral Q6H   saccharomyces boulardii 250 mg Oral BID   zonisamide 50 mg Oral Daily       Continuous Infusions:     Physical exam: General appearance:  Alert oriented x3 no distress interaction appropriate  Head/Eyes:  Nonicteric PERRL EOMI  Neck:  Supple  Lungs: CTA bilateral no wheezing rhonchi or rales  Heart: normal S1 S2 regular 2/6 SHELDON  Abdomen: Soft nontender with bowel sounds  Extremities: no edema  Skin: no rash    Invasive Devices     Peripheral Intravenous Line            Peripheral IV 10/09/17 Left Arm 1 day                  VTE Pharmacologic Prophylaxis:  heparin   VTE Mechanical Prophylaxis:  SCDs                     Counseling / Coordination of Care  Total floor / unit time spent today  30   minutes  Greater than 50% of total time was spent with the patient and / or family counseling and / or coordination of care    A description of the counseling / coordination of care:  Discussed with Neuro

## 2017-10-11 NOTE — PROGRESS NOTES
Progress Note - Neurology   Kirt Ramírez 60 y o  female MRN: [id number]  Unit/Bed#: E2 -01 Encounter: [id number]    Assessment:  Complex migraine, history of chronic migraines    Plan:  1  Cautiously add Depakote  mg at HS for headache prevention  Depakote has the potential to cause Lamictal toxicity, and drug levels must be obtained in 10-14 days  Prescription at discharge should be written for a maximum of 21 doses, requiring her to seek follow-up  2   She is an established patient with [facility], Dr Charmayne Citron, with whom she should seek follow-up for headache management  3   Medications should also be reviewed with her private psychiatrist            Subjective:   "I feel better but not fantastic "  Headache persists but not as intense  She has found the Depacon to be the most helpful for headache relief  Taking diet  Medications    ARIPiprazole 10 mg Oral Daily   aspirin 325 mg Oral Daily   atorvastatin 40 mg Oral QPM   divalproex sodium 250 mg Oral HS   fluvoxaMINE 300 mg Oral HS   gabapentin 600 mg Oral TID   heparin (porcine) 5,000 Units Subcutaneous Q8H [address]   hydrocortisone 20 mg Oral BID   lamoTRIgine 200 mg Oral Daily   ondansetron 4 mg Intravenous Q8H   prochlorperazine 5 mg Oral Q6H   saccharomyces boulardii 250 mg Oral BID   zonisamide 50 mg Oral Daily       Vitals: Blood pressure 105/53, pulse 71, temperature 97 5 °F (36 4 °C), temperature source Tympanic, resp  rate 16, height 5' 8" (1 727 m), weight 96 3 kg (212 lb 4 9 oz), SpO2 96 %  ,Body mass index is 32 28 kg/m²  Physical Exam:   Gen:  Sitting on edge of bed, no distress  Room remains darkened  Heart rate is 70s  Neuro    MSE awake, alert, interactive, fluent, no dysarthria  Normal comprehension    CN decreased blink rate, conjugate gaze, symmetric faces  Motor symmetric upper extremity strength    Tone normal        Lab, Imaging and other studies:   BMP/CMP:   Results from last 7 days  Lab Units 10/11/17  6720 10/10/17  0456 10/09/17  0500 10/08/17  0740   SODIUM mmol/L 144 145 144 143   POTASSIUM mmol/L 3 8 3 8 3 9 3 3*   CHLORIDE mmol/L 112* 110* 107 107   CO2 mmol/L 29 29 26 29   ANION GAP mmol/L 3* 6 11 7   BUN mg/dL 14 17 12 12   CREATININE mg/dL 0 93 0 94 0 89 0 93   GLUCOSE RANDOM mg/dL 87 91 134 110   CALCIUM mg/dL 8 8 8 8 9 5 9 1   AST U/L  --   --   --  21   ALT U/L  --   --   --  22   ALK PHOS U/L  --   --   --  92   TOTAL PROTEIN g/dL  --   --   --  7 1   ALBUMIN g/dL  --   --   --  3 5   BILIRUBIN TOTAL mg/dL  --   --   --  0 24   EGFR ml/min/1 73sq m 68 67 72 68   , HgBA1C:   Results from last 7 days  Lab Units 10/09/17  0500   HEMOGLOBIN A1C % 5 4   , Lipid Profile:   Results from last 7 days  Lab Units 10/09/17  0500   HDL mg/dL 84*   CHOLESTEROL mg/dL 201*   LDL CALC mg/dL 105*   TRIGLYCERIDES mg/dL 62   , Urinalysis:   Results from last 7 days  Lab Units 10/08/17  0757   COLOR UA  Yellow   CLARITY UA  Clear   SPEC GRAV UA  <=1 005   PH UA  5 0   LEUKOCYTES UA  Trace*   NITRITE UA  Negative   PROTEIN UA mg/dl Negative   GLUCOSE UA mg/dl Negative   KETONES UA mg/dl Negative   BILIRUBIN UA  Negative   BLOOD UA  Negative         Counseling / Coordination of Care  Total time spent today 15 minutes  Greater than 50% of total time was spent with the patient and / or family counseling and / or coordination of care   A description of the counseling / coordination of care: Treatment plan, necessity of close follow-up with her private neurologist

## 2017-10-11 NOTE — DISCHARGE SUMMARY
Discharge Summary - Medical Nessa Calle 60 y o  female MRN: [id number]    [place] [address] MED SURG Room / Bed: [place] [address] /E2 -* Encounter: [id number]    BRIEF OVERVIEW    Admitting Provider: Padmaja Morales DO  Discharge Provider: Samantha Bright DO  Admission Date: 10/8/2017     Discharge Date: No discharge date for patient encounter  Primary Care Physician at Discharge: Charlena Bloch, [phone]    Primary Discharge Diagnosis  Principal Problem:    Migraine  Active Problems:    Bipolar depression ([facility] )    Ad[facility]Evans's [facility] )    Fibromyalgia    Status post gastrectomy    History of TIAs  Resolved Problems:    * No resolved hospital problems  *      Other Problems Addressed  Patient Active Problem List    Diagnosis Date Noted    Hypertension     History of TIAs     Migraine     HLD (hyperlipidemia) 05/28/2017    Bipolar depression ([facility] )     Adrenal insufficiency (Evans's disease) ([facility] )     Fibromyalgia     Status post gastrectomy 04/08/2016    Osteoarthritis of lumbosacral spine without myelopathy 09/08/2015    Spinal stenosis of lumbar region 05/05/2015       Consulting Providers   Dr Camilla Gaucher in one week    Discharge Disposition: home    Allergies  Allergies   Allergen Reactions    Ketorolac Itching and Other (See Comments)     [toradol] Itching, hives    Mushroom Extract Complex     Risperidone      Other reaction(s): Unknown Reaction    Temazepam Hives     Diet restrictions: none  Activity restrictions: none  Discharge Condition: good      Outpatient Follow-Up  Dr Francisca Reynoso in one week  Follow up with consulting providers  [facility] neurology in 2 weeks       [address] [place] [address]    Presenting Problem/History of Present Illness  Migraine  Hospital Course  60year old presented with migraine headache  Migraine headaches History of chronic headaches  Admitted for pain control and consulted neurology  Neurology diagnosis complicated migraine, given IV steroids, Depacon, Reglan and Magnesium  Headache improved discharge to home with follow up with [facility] neurology  Other medical conditions stable on discharge     Discharge  Statement   I spent 45 minutes discharging the patient  This time was spent on the day of discharge  I had direct contact with the patient on the day of discharge  Additional documentation is required if more than 30 minutes were spent on discharge  Discussed with Neurology    Discharge instructions/Information to patient and family:   See after visit summary for information provided to patient and family

## 2017-12-21 ENCOUNTER — APPOINTMENT (EMERGENCY)
Dept: CT IMAGING | Facility: HOSPITAL | Age: 57
End: 2017-12-21
Payer: COMMERCIAL

## 2017-12-21 ENCOUNTER — HOSPITAL ENCOUNTER (EMERGENCY)
Facility: HOSPITAL | Age: 57
Discharge: HOME | End: 2017-12-21
Attending: EMERGENCY MEDICINE | Admitting: EMERGENCY MEDICINE
Payer: COMMERCIAL

## 2017-12-21 VITALS
BODY MASS INDEX: 31.07 KG/M2 | WEIGHT: 205 LBS | RESPIRATION RATE: 18 BRPM | SYSTOLIC BLOOD PRESSURE: 140 MMHG | HEART RATE: 69 BPM | HEIGHT: 68 IN | DIASTOLIC BLOOD PRESSURE: 77 MMHG | TEMPERATURE: 99 F | OXYGEN SATURATION: 98 %

## 2017-12-21 DIAGNOSIS — S09.90XA MINOR CLOSED HEAD INJURY: ICD-10-CM

## 2017-12-21 DIAGNOSIS — R55 SYNCOPE: Primary | ICD-10-CM

## 2017-12-21 LAB
ANION GAP SERPL CALCULATED.3IONS-SCNC: 9 MMOL/L (ref 4–13)
BACTERIA UR QL AUTO: ABNORMAL /HPF
BASOPHILS # BLD AUTO: 0.05 THOUSANDS/ΜL (ref 0–0.1)
BASOPHILS NFR BLD AUTO: 1 % (ref 0–1)
BILIRUB UR QL STRIP: NEGATIVE
BUN SERPL-MCNC: 13 MG/DL (ref 5–25)
CALCIUM SERPL-MCNC: 9 MG/DL (ref 8.3–10.1)
CHLORIDE SERPL-SCNC: 110 MMOL/L (ref 100–108)
CLARITY UR: CLEAR
CO2 SERPL-SCNC: 26 MMOL/L (ref 21–32)
COLOR UR: YELLOW
COLOR, POC: YELLOW
CREAT SERPL-MCNC: 0.8 MG/DL (ref 0.6–1.3)
EOSINOPHIL # BLD AUTO: 0.18 THOUSAND/ΜL (ref 0–0.61)
EOSINOPHIL NFR BLD AUTO: 2 % (ref 0–6)
ERYTHROCYTE [DISTWIDTH] IN BLOOD BY AUTOMATED COUNT: 14.7 % (ref 11.6–15.1)
GFR SERPL CREATININE-BSD FRML MDRD: 81 ML/MIN/1.73SQ M
GLUCOSE SERPL-MCNC: 90 MG/DL (ref 65–140)
GLUCOSE UR STRIP-MCNC: NEGATIVE MG/DL
HCT VFR BLD AUTO: 33.3 % (ref 34.8–46.1)
HGB BLD-MCNC: 10.9 G/DL (ref 11.5–15.4)
HGB UR QL STRIP.AUTO: ABNORMAL
KETONES UR STRIP-MCNC: NEGATIVE MG/DL
LEUKOCYTE ESTERASE UR QL STRIP: NEGATIVE
LYMPHOCYTES # BLD AUTO: 2.81 THOUSANDS/ΜL (ref 0.6–4.47)
LYMPHOCYTES NFR BLD AUTO: 33 % (ref 14–44)
MCH RBC QN AUTO: 30.3 PG (ref 26.8–34.3)
MCHC RBC AUTO-ENTMCNC: 32.7 G/DL (ref 31.4–37.4)
MCV RBC AUTO: 93 FL (ref 82–98)
MONOCYTES # BLD AUTO: 0.97 THOUSAND/ΜL (ref 0.17–1.22)
MONOCYTES NFR BLD AUTO: 11 % (ref 4–12)
NEUTROPHILS # BLD AUTO: 4.48 THOUSANDS/ΜL (ref 1.85–7.62)
NEUTS SEG NFR BLD AUTO: 53 % (ref 43–75)
NITRITE UR QL STRIP: NEGATIVE
NON-SQ EPI CELLS URNS QL MICRO: ABNORMAL /HPF
NRBC BLD AUTO-RTO: 0 /100 WBCS
PH UR STRIP.AUTO: 5.5 [PH] (ref 4.5–8)
PLATELET # BLD AUTO: 281 THOUSANDS/UL (ref 149–390)
PMV BLD AUTO: 10.3 FL (ref 8.9–12.7)
POTASSIUM SERPL-SCNC: 3.6 MMOL/L (ref 3.5–5.3)
PROT UR STRIP-MCNC: NEGATIVE MG/DL
RBC # BLD AUTO: 3.6 MILLION/UL (ref 3.81–5.12)
RBC #/AREA URNS AUTO: ABNORMAL /HPF
SODIUM SERPL-SCNC: 145 MMOL/L (ref 136–145)
SP GR UR STRIP.AUTO: 1.02 (ref 1–1.03)
SPECIMEN SOURCE: NORMAL
TROPONIN I BLD-MCNC: 0 NG/ML (ref 0–0.08)
UROBILINOGEN UR QL STRIP.AUTO: 0.2 E.U./DL
WBC # BLD AUTO: 8.49 THOUSAND/UL (ref 4.31–10.16)
WBC #/AREA URNS AUTO: ABNORMAL /HPF

## 2017-12-21 PROCEDURE — 85025 COMPLETE CBC W/AUTO DIFF WBC: CPT | Performed by: EMERGENCY MEDICINE

## 2017-12-21 PROCEDURE — 70450 CT HEAD/BRAIN W/O DYE: CPT

## 2017-12-21 PROCEDURE — 72125 CT NECK SPINE W/O DYE: CPT

## 2017-12-21 PROCEDURE — 93005 ELECTROCARDIOGRAM TRACING: CPT | Performed by: EMERGENCY MEDICINE

## 2017-12-21 PROCEDURE — 80048 BASIC METABOLIC PNL TOTAL CA: CPT | Performed by: EMERGENCY MEDICINE

## 2017-12-21 PROCEDURE — 81001 URINALYSIS AUTO W/SCOPE: CPT

## 2017-12-21 PROCEDURE — 84484 ASSAY OF TROPONIN QUANT: CPT

## 2017-12-21 PROCEDURE — 96374 THER/PROPH/DIAG INJ IV PUSH: CPT

## 2017-12-21 PROCEDURE — 99285 EMERGENCY DEPT VISIT HI MDM: CPT

## 2017-12-21 PROCEDURE — 93005 ELECTROCARDIOGRAM TRACING: CPT

## 2017-12-21 PROCEDURE — 96361 HYDRATE IV INFUSION ADD-ON: CPT

## 2017-12-21 PROCEDURE — 81002 URINALYSIS NONAUTO W/O SCOPE: CPT | Performed by: EMERGENCY MEDICINE

## 2017-12-21 PROCEDURE — 36415 COLL VENOUS BLD VENIPUNCTURE: CPT | Performed by: EMERGENCY MEDICINE

## 2017-12-21 RX ORDER — ONDANSETRON 2 MG/ML
4 INJECTION INTRAMUSCULAR; INTRAVENOUS ONCE
Status: COMPLETED | OUTPATIENT
Start: 2017-12-21 | End: 2017-12-21

## 2017-12-21 RX ORDER — ACETAMINOPHEN 325 MG/1
650 TABLET ORAL ONCE
Status: COMPLETED | OUTPATIENT
Start: 2017-12-21 | End: 2017-12-21

## 2017-12-21 RX ADMIN — SODIUM CHLORIDE 1000 ML: 0.9 INJECTION, SOLUTION INTRAVENOUS at 18:24

## 2017-12-21 RX ADMIN — ONDANSETRON 4 MG: 2 INJECTION INTRAMUSCULAR; INTRAVENOUS at 19:07

## 2017-12-21 RX ADMIN — ACETAMINOPHEN 650 MG: 325 TABLET, FILM COATED ORAL at 19:07

## 2017-12-21 NOTE — ED PROVIDER NOTES
History  Chief Complaint   Patient presents with    Syncope     patient reports 2 syncopal episodes today, once at 0900 and then again at 1430  Reports hitting head  C/O pain to head, neck, back, hips, and right leg  Pt has been ambulatory since both episodes  C/O dizziness and nausea  History provided by:  Patient  Syncope   Episode history:  Multiple  Most recent episode: Today  Timing:  Intermittent  Progression:  Unchanged  Chronicity:  Chronic  Context: normal activity    Witnessed: no    Relieved by:  Nothing  Worsened by:  Nothing  Ineffective treatments:  None tried  Associated symptoms: chest pain and headaches    Associated symptoms: no difficulty breathing, no dizziness, no fever, no recent injury and no visual change        Prior to Admission Medications   Prescriptions Last Dose Informant Patient Reported? Taking? ARIPiprazole (ABILIFY) 5 mg tablet   No Yes   Sig: Take 1 tablet by mouth daily   Patient taking differently: Take 15 mg by mouth daily     LORazepam (ATIVAN) 2 mg tablet   No Yes   Sig: Take 0 5 tablets by mouth every 6 (six) hours as needed for anxiety (2- 2mg during the day and 4mg at night) for up to 10 days   Patient taking differently: 2 mg 2mg BID  4mg HS    aspirin 325 mg tablet   Yes Yes   Sig: Take 325 mg by mouth daily  fluvoxaMINE (LUVOX) 100 mg tablet   Yes Yes   Sig: Take 300 mg by mouth daily at bedtime     furosemide (LASIX) 20 mg tablet   Yes Yes   Sig: Take 20 mg by mouth daily   gabapentin (NEURONTIN) 600 MG tablet   Yes Yes   Sig: Take 600 mg by mouth 3 (three) times a day  hydrocortisone (CORTEF) 10 mg tablet   No Yes   Sig: Take 1 tablet by mouth 3 (three) times a day   Patient taking differently: Take 20 mg by mouth 2 (two) times a day     lamoTRIgine (LaMICtal) 200 MG tablet   Yes Yes   Sig: Take 200 mg by mouth daily     methocarbamol (ROBAXIN) 500 mg tablet   No Yes   Sig: Take 2 tablets by mouth 3 (three) times a day as needed for muscle spasms zonisamide (ZONEGRAN) 50 MG capsule   No Yes   Sig: Take 1 capsule by mouth daily      Facility-Administered Medications: None       Past Medical History:   Diagnosis Date    Adrenal insufficiency ([place]'s disease) ([facility] )     Bipolar disorder     Cervical radiculopathy     Chronic back pain     DVT, lower extremity ([facility] ) 1991    Fibromyalgia     History of TIAs     cannot remember details    Hypertension     Hypokalemia     Migraine     Psychiatric disorder     Anxiety, major depression, bipolar    Spinal stenosis     Syncope 2014    orthostatic hypotension       Past Surgical History:   Procedure Laterality Date    APPENDECTOMY      GASTRIC RESTRICTION SURGERY      Gastric Sleeve Nov 2015    HYSTERECTOMY      JOINT REPLACEMENT      Left Knee 2011 and Right Hip 2010       Family History   Problem Relation Age of Onset    Breast cancer Mother     Migraines Mother     Arthritis Mother     Kidney disease Father     Heart disease Father     Diabetes Father     Arthritis Father     Brain cancer Brother     Seizures Brother      I have reviewed and agree with the history as documented  Social History   Substance Use Topics    Smoking status: Former Smoker     Packs/day: 0 20     Types: Cigarettes     Quit date: 2008    Smokeless tobacco: Never Used    Alcohol use No        Review of Systems   Constitutional: Negative for chills and fever  Respiratory: Negative for chest tightness  Cardiovascular: Positive for chest pain and syncope  Musculoskeletal: Positive for arthralgias, back pain and neck pain  Neurological: Positive for syncope and headaches  Negative for dizziness  All other systems reviewed and are negative        Physical Exam  ED Triage Vitals   Temperature Pulse Respirations Blood Pressure SpO2   12/21/17 1654 12/21/17 1654 12/21/17 1654 12/21/17 1654 12/21/17 1654   99 °F (37 2 °C) 72 16 150/71 96 %      Temp src Heart Rate Source Patient Position - Orthostatic VS BP Location FiO2 (%)   -- 12/21/17 1812 12/21/17 2012 12/21/17 2012 --    Monitor Lying Right arm       Pain Score       12/21/17 1812       Worst Possible Pain           Orthostatic Vital Signs  Vitals:    12/21/17 1654 12/21/17 1812 12/21/17 2012   BP: 150/71 151/68 140/77   Pulse: 72 63 69   Patient Position - Orthostatic VS:   Lying       Physical Exam   Constitutional: She is oriented to person, place, and time  She appears well-developed and well-nourished  No distress  HENT:   Head: Normocephalic and atraumatic  Right Ear: External ear normal    Left Ear: External ear normal    Eyes: Conjunctivae and EOM are normal  Pupils are equal, round, and reactive to light  No scleral icterus  Neck: Normal range of motion  Cardiovascular: Normal rate and regular rhythm  Murmur heard  Systolic murmur is present with a grade of 3/6   Pulmonary/Chest: Effort normal and breath sounds normal  No respiratory distress  Abdominal: Soft  Bowel sounds are normal  There is no tenderness  There is no rebound and no guarding  Musculoskeletal: Normal range of motion  She exhibits no edema  Neurological: She is alert and oriented to person, place, and time  Skin: Skin is warm and dry  No rash noted  Psychiatric: She has a normal mood and affect  Nursing note and vitals reviewed        ED Medications  Medications   sodium chloride 0 9 % bolus 1,000 mL (0 mL Intravenous Stopped 12/21/17 2002)   acetaminophen (TYLENOL) tablet 650 mg (650 mg Oral Given 12/21/17 1907)   ondansetron (ZOFRAN) injection 4 mg (4 mg Intravenous Given 12/21/17 1907)       Diagnostic Studies  Results Reviewed     Procedure Component Value Units Date/Time    POCT troponin [[id number]]  (Normal) Collected:  12/21/17 1824    Lab Status:  Final result Updated:  12/21/17 1838     POC Troponin I 0 00 ng/ml      Specimen Type VENOUS    Narrative:         [employer] i-Stat handheld analyzer 99% cutoff is > 0 08ng/mL in network Emergency Departments    o cTnI 99% cutoff is useful only when applied to patients in the clinical setting of myocardial ischemia  o cTnI 99% cutoff should be interpreted in the context of clinical history, ECG findings and possibly cardiac imaging to establish correct diagnosis  o cTnI 99% cutoff may be suggestive but clearly not indicative of a coronary event without the clinical setting of myocardial ischemia  Basic metabolic panel [[id number]]  (Abnormal) Collected:  12/21/17 1822    Lab Status:  Final result Specimen:  Blood from Arm, Left Updated:  12/21/17 1837     Sodium 145 mmol/L      Potassium 3 6 mmol/L      Chloride 110 (H) mmol/L      CO2 26 mmol/L      Anion Gap 9 mmol/L      BUN 13 mg/dL      Creatinine 0 80 mg/dL      Glucose 90 mg/dL      Calcium 9 0 mg/dL      eGFR 81 ml/min/1 73sq m     Narrative:         National Kidney Disease Education Program recommendations are as follows:  GFR calculation is accurate only with a steady state creatinine  Chronic Kidney disease less than 60 ml/min/1 73 sq  meters  Kidney failure less than 15 ml/min/1 73 sq  meters      Urine Microscopic [[id number]]  (Abnormal) Collected:  12/21/17 1836    Lab Status:  Final result Specimen:  Urine from Urine, Clean Catch Updated:  12/21/17 1835     RBC, UA 0-1 (A) /hpf      WBC, UA 1-2 (A) /hpf      Epithelial Cells Occasional /hpf      Bacteria, UA Occasional /hpf     CBC and differential [[id number]]  (Abnormal) Collected:  12/21/17 1822    Lab Status:  Final result Specimen:  Blood from Arm, Left Updated:  12/21/17 1829     WBC 8 49 Thousand/uL      RBC 3 60 (L) Million/uL      Hemoglobin 10 9 (L) g/dL      Hematocrit 33 3 (L) %      MCV 93 fL      MCH 30 3 pg      MCHC 32 7 g/dL      RDW 14 7 %      MPV 10 3 fL      Platelets 339 Thousands/uL      nRBC 0 /100 WBCs      Neutrophils Relative 53 %      Lymphocytes Relative 33 %      Monocytes Relative 11 %      Eosinophils Relative 2 %      Basophils Relative 1 %      Neutrophils Absolute 4 48 Thousands/µL      Lymphocytes Absolute 2 81 Thousands/µL      Monocytes Absolute 0 97 Thousand/µL      Eosinophils Absolute 0 18 Thousand/µL      Basophils Absolute 0 05 Thousands/µL     POCT urinalysis dipstick [[id number]]  (Normal) Resulted:  12/21/17 1819    Lab Status:  Final result Specimen:  Urine Updated:  12/21/17 1819     Color, UA yellow    ED Urine Macroscopic [[id number]]  (Abnormal) Collected:  12/21/17 1836    Lab Status:  Final result Specimen:  Urine Updated:  12/21/17 1818     Color, UA Yellow     Clarity, UA Clear     pH, UA 5 5     Leukocytes, UA Negative     Nitrite, UA Negative     Protein, UA Negative mg/dl      Glucose, UA Negative mg/dl      Ketones, UA Negative mg/dl      Urobilinogen, UA 0 2 E U /dl      Bilirubin, UA Negative     Blood, UA Trace (A)     Specific [place], UA 1 020    Narrative:       CLINITEK RESULT                 CT cervical spine without contrast   Final Result by Rene Rosales MD (12/21 1946)      No acute fracture or dislocation  Workstation performed: [id number]         CT head without contrast   Final Result by Rene Rosales MD (12/21 1937)      No acute intracranial abnormality           Workstation performed: [id number]                    Procedures  ECG 12 Lead Documentation  Date/Time: 12/21/2017 6:04 PM  Performed by: Maria C Tamayo  Authorized by: Renard ARTHUR     Indications / Diagnosis:  Syncope  ECG reviewed by me, the ED Provider: yes    Patient location:  ED  Previous ECG:     Previous ECG:  Compared to current    Comparison ECG info:  10/8/2017    Similarity:  No change  Interpretation:     Interpretation: abnormal    Rate:     ECG rate:  70    ECG rate assessment: normal    Rhythm:     Rhythm: sinus rhythm    Ectopy:     Ectopy: none    QRS:     QRS axis:  Normal    QRS intervals:  Normal  Conduction:     Conduction: normal    ST segments:     ST segments:  Non-specific  T waves:     T waves: normal             Phone Contacts  ED Phone Contact    ED Course  ED Course                                MDM  Number of Diagnoses or Management Options  Minor closed head injury: new and requires workup  Syncope: new and requires workup  Diagnosis management comments: 57-year-old female presents for evaluation of 2 episodes of recurrent syncope today  The patient states that she fell backwards and struck her head on the tile and now has headache with some associated neck pain  The plan is to obtain CTs of the head and neck to rule out acute traumatic injury  Laboratories will be obtained as well as an EKG to rule out other emergent etiologies of recurrent syncope for this patient who has a longstanding history of syncope  Amount and/or Complexity of Data Reviewed  Clinical lab tests: reviewed and ordered  Tests in the radiology section of CPT®: ordered and reviewed  Tests in the medicine section of CPT®: ordered and reviewed  Review and summarize past medical records: yes (Patient recently seen at [facility] for similar type of incident  Patient was noted to be dehydrated and discharged home  The patient has a scheduled follow-up appointment with primary care tomorrow)      CritCare Time    Disposition  Final diagnoses:   Syncope   Minor closed head injury     Time reflects when diagnosis was documented in both MDM as applicable and the Disposition within this note     Time User Action Codes Description Comment    12/21/2017  8:04 PM Caitlyn Jennings Add [R55] Syncope     12/21/2017  8:05 PM Caitlyn Jennings Add [S00 90XA] Minor closed head injury       ED Disposition     ED Disposition Condition Comment    Discharge  [address] discharge to home/self care      Condition at discharge: Stable        Follow-up Information     Follow up With Specialties Details Why [id number] Ospina MD Internal Medicine In 1 day For further evaluation as previously scheduled [id number] [facility]  [address] [address]  [phone]          Discharge Medication List as of 12/21/2017  8:06 PM      CONTINUE these medications which have NOT CHANGED    Details   ARIPiprazole (ABILIFY) 5 mg tablet Take 1 tablet by mouth daily, Starting Fri 8/4/2017, Print      aspirin 325 mg tablet Take 325 mg by mouth daily  , Until Discontinued, Historical Med      fluvoxaMINE (LUVOX) 100 mg tablet Take 300 mg by mouth daily at bedtime  , Historical Med      furosemide (LASIX) 20 mg tablet Take 20 mg by mouth daily, Historical Med      gabapentin (NEURONTIN) 600 MG tablet Take 600 mg by mouth 3 (three) times a day , Until Discontinued, Historical Med      hydrocortisone (CORTEF) 10 mg tablet Take 1 tablet by mouth 3 (three) times a day, Starting Fri 8/4/2017, Print      lamoTRIgine (LaMICtal) 200 MG tablet Take 200 mg by mouth daily  , Until Discontinued, Historical Med      LORazepam (ATIVAN) 2 mg tablet Take 0 5 tablets by mouth every 6 (six) hours as needed for anxiety (2- 2mg during the day and 4mg at night) for up to 10 days, Starting Fri 8/4/2017, Until u 12/21/2017, No Print      methocarbamol (ROBAXIN) 500 mg tablet Take 2 tablets by mouth 3 (three) times a day as needed for muscle spasms, Starting Fri 8/4/2017, No Print      zonisamide (ZONEGRAN) 50 MG capsule Take 1 capsule by mouth daily, Starting Fri 8/4/2017, No Print           No discharge procedures on file      ED Provider  Electronically Signed by           Genesis Nice DO  12/21/17 3910

## 2017-12-22 LAB
ATRIAL RATE: 70 BPM
P AXIS: 31 DEGREES
PR INTERVAL: 152 MS
QRS AXIS: -14 DEGREES
QRSD INTERVAL: 82 MS
QT INTERVAL: 378 MS
QTC INTERVAL: 408 MS
T WAVE AXIS: 0 DEGREES
VENTRICULAR RATE: 70 BPM

## 2017-12-22 NOTE — DISCHARGE INSTRUCTIONS
Syncope   WHAT YOU NEED TO KNOW:   Syncope is also called fainting or passing out  Syncope is a sudden, temporary loss of consciousness, followed by a fall from a standing or sitting position  Syncope ranges from not serious to a sign of a more serious condition that needs to be treated  You can control some health conditions that cause syncope  Your healthcare providers can help you create a plan to manage syncope and prevent episodes  DISCHARGE INSTRUCTIONS:   Seek care immediately if:   · You are bleeding because you hit your head when you fainted  · You suddenly have double vision, difficulty speaking, numbness, and cannot move your arms or legs  · You have chest pain and trouble breathing  · You vomit blood or material that looks like coffee grounds  · You see blood in your bowel movement  Contact your healthcare provider if:   · You have new or worsening symptoms  · You have another syncope episode  · You have a headache, fast heartbeat, or feel too dizzy to stand up  · You have questions or concerns about your condition or care  Follow up with your healthcare provider as directed:  Write down your questions so you remember to ask them during your visits  Manage syncope:   · Keep a record of your syncope episodes  Include your symptoms and your activity before and after the episode  The record can help your healthcare provider find the cause of your syncope and help you manage episodes  · Sit or lie down when needed  This includes when you feel dizzy, your throat is getting tight, and your vision changes  Raise your legs above the level of your heart  · Take slow, deep breaths if you start to breathe faster with anxiety or fear  This can help decrease dizziness and the feeling that you might faint  · Check your blood pressure often  This is important if you take medicine to lower your blood pressure   Check your blood pressure when you are lying down and when you are standing  Ask how often to check during the day  Keep a record of your blood pressure numbers  Your healthcare provider may use the record to help plan your treatment  Prevent a syncope episode:   · Move slowly and let yourself get used to one position before you move to another position  This is very important when you change from a lying or sitting position to a standing position  Take some deep breaths before you stand up from a lying position  Stand up slowly  Sudden movements may cause a fainting spell  Sit on the side of the bed or couch for a few minutes before you stand up  If you are on bedrest, try to be upright for about 2 hours each day, or as directed  Do not lock your legs if you are standing for a long period of time  Move your legs and bend your knees to keep blood flowing  · Follow your healthcare provider's recommendations  Your provider may  recommend that you drink more liquids to prevent dehydration  You may also need to have more salt to keep your blood pressure from dropping too low and causing syncope  Your provider will tell you how much liquid and sodium to have each day  · Watch for signs of low blood sugar  These include hunger, nervousness, sweating, and fast or fluttery heartbeats  Talk with your healthcare provider about ways to keep your blood sugar level steady  · Do not strain if you are constipated  You may faint if you strain to have a bowel movement  Walking is the best way to get your bowels moving  Eat foods high in fiber to make it easier to have a bowel movement  Good examples are high-fiber cereals, beans, vegetables, and whole-grain breads  Prune juice may help make bowel movements softer  · Be careful in hot weather  Heat can cause a syncope episode  Limit activity done outside on hot days  Physical activity in hot weather can lead to dehydration  This can cause an episode    © 2017 [employer] Information is for End User's use only and may not be sold, redistributed or otherwise used for commercial purposes  All illustrations and images included in CareNotes® are the copyrighted property of [employer]  or [employer]  The above information is an  only  It is not intended as medical advice for individual conditions or treatments  Talk to your doctor, nurse or pharmacist before following any medical regimen to see if it is safe and effective for you  Head Injury   WHAT YOU NEED TO KNOW:   A head injury is most often caused by a blow to the head  This may occur from a fall, bicycle injury, sports injury, being struck in the head, or a motor vehicle accident  DISCHARGE INSTRUCTIONS:   Call 911 or have someone else call for any of the following:   · You cannot be woken  · You have a seizure  · You stop responding to others or you faint  · You have blurry or double vision  · Your speech becomes slurred or confused  · You have arm or leg weakness, loss of feeling, or new problems with coordination  · Your pupils are larger than usual or one pupil is a different size than the other  · You have blood or clear fluid coming out of your ears or nose  Return to the emergency department if:   · You have repeated or forceful vomiting  · You feel confused  · Your headache gets worse or becomes severe  · You or someone caring for you notices that you are harder to wake than usual   Contact your healthcare provider if:   · Your symptoms last longer than 6 weeks after the injury  · You have questions or concerns about your condition or care  Medicines:   · Acetaminophen  decreases pain  Acetaminophen is available without a doctor's order  Ask how much to take and how often to take it  Follow directions  Acetaminophen can cause liver damage if not taken correctly  · Take your medicine as directed    Contact your healthcare provider if you think your medicine is not helping or if you have side effects  Tell him or her if you are allergic to any medicine  Keep a list of the medicines, vitamins, and herbs you take  Include the amounts, and when and why you take them  Bring the list or the pill bottles to follow-up visits  Carry your medicine list with you in case of an emergency  Self-care:   · Rest  or do quiet activities for 24 to 48 hours  Limit your time watching TV, using the computer, or doing tasks that require a lot of thinking  Slowly return to your normal activities as directed  Do not play sports or do activities that may cause you to get hit in the head  Ask your healthcare provider when you can return to sports  · Apply ice  on your head for 15 to 20 minutes every hour or as directed  Use an ice pack, or put crushed ice in a plastic bag  Cover it with a towel before you apply it to your skin  Ice helps prevent tissue damage and decreases swelling and pain  · Have someone stay with you for 24 hours  or as directed  This person can monitor you for complications and call [phone]  When you are awake the person should ask you a few questions to see if you are thinking clearly  An example would be to ask your name or your address  Prevent another head injury:   · Wear a helmet that fits properly  Do this when you play sports, or ride a bike, scooter, or skateboard  Helmets help decrease your risk of a serious head injury  Talk to your healthcare provider about other ways you can protect yourself if you play sports  · Wear your seat belt every time you are in a car  This helps to decrease your risk for a head injury if you are in a car accident  Follow up with your healthcare provider as directed:  Write down your questions so you remember to ask them during your visits  © 2017 [employer] Information is for End User's use only and may not be sold, redistributed or otherwise used for commercial purposes   All illustrations and images included in CareNotes® are the copyrighted property of [employer]  or Jesse [place]  The above information is an  only  It is not intended as medical advice for individual conditions or treatments  Talk to your doctor, nurse or pharmacist before following any medical regimen to see if it is safe and effective for you

## 2018-01-04 ENCOUNTER — HOSPITAL ENCOUNTER (OUTPATIENT)
Facility: HOSPITAL | Age: 58
Setting detail: OBSERVATION
Discharge: HOME HEALTH | End: 2018-01-06
Attending: EMERGENCY MEDICINE | Admitting: INTERNAL MEDICINE
Payer: COMMERCIAL

## 2018-01-04 ENCOUNTER — APPOINTMENT (OUTPATIENT)
Dept: RADIOLOGY | Facility: HOSPITAL | Age: 58
End: 2018-01-04
Payer: COMMERCIAL

## 2018-01-04 ENCOUNTER — APPOINTMENT (EMERGENCY)
Dept: RADIOLOGY | Facility: HOSPITAL | Age: 58
End: 2018-01-04
Payer: COMMERCIAL

## 2018-01-04 ENCOUNTER — APPOINTMENT (EMERGENCY)
Dept: CT IMAGING | Facility: HOSPITAL | Age: 58
End: 2018-01-04
Payer: COMMERCIAL

## 2018-01-04 DIAGNOSIS — M25.551 RIGHT HIP PAIN: ICD-10-CM

## 2018-01-04 DIAGNOSIS — R07.9 CHEST PAIN, RULE OUT ACUTE MYOCARDIAL INFARCTION: ICD-10-CM

## 2018-01-04 DIAGNOSIS — R51.9 ACUTE HEADACHE: ICD-10-CM

## 2018-01-04 DIAGNOSIS — M54.2 NECK PAIN: ICD-10-CM

## 2018-01-04 DIAGNOSIS — R55 SYNCOPE: Primary | ICD-10-CM

## 2018-01-04 PROBLEM — R07.89 CHEST WALL PAIN: Status: ACTIVE | Noted: 2018-01-04

## 2018-01-04 PROBLEM — M54.50 LOW BACK PAIN: Status: ACTIVE | Noted: 2018-01-04

## 2018-01-04 LAB
ALBUMIN SERPL BCP-MCNC: 3.6 G/DL (ref 3.5–5)
ALP SERPL-CCNC: 94 U/L (ref 46–116)
ALT SERPL W P-5'-P-CCNC: 18 U/L (ref 12–78)
ANION GAP SERPL CALCULATED.3IONS-SCNC: 9 MMOL/L (ref 4–13)
AST SERPL W P-5'-P-CCNC: 18 U/L (ref 5–45)
ATRIAL RATE: 78 BPM
BASOPHILS # BLD AUTO: 0.04 THOUSANDS/ΜL (ref 0–0.1)
BASOPHILS NFR BLD AUTO: 1 % (ref 0–1)
BILIRUB SERPL-MCNC: 0.28 MG/DL (ref 0.2–1)
BUN SERPL-MCNC: 12 MG/DL (ref 5–25)
CALCIUM SERPL-MCNC: 9 MG/DL (ref 8.3–10.1)
CHLORIDE SERPL-SCNC: 107 MMOL/L (ref 100–108)
CO2 SERPL-SCNC: 27 MMOL/L (ref 21–32)
CREAT SERPL-MCNC: 0.96 MG/DL (ref 0.6–1.3)
EOSINOPHIL # BLD AUTO: 0.19 THOUSAND/ΜL (ref 0–0.61)
EOSINOPHIL NFR BLD AUTO: 2 % (ref 0–6)
ERYTHROCYTE [DISTWIDTH] IN BLOOD BY AUTOMATED COUNT: 14.8 % (ref 11.6–15.1)
GFR SERPL CREATININE-BSD FRML MDRD: 65 ML/MIN/1.73SQ M
GLUCOSE SERPL-MCNC: 112 MG/DL (ref 65–140)
HCT VFR BLD AUTO: 35.9 % (ref 34.8–46.1)
HGB BLD-MCNC: 11.7 G/DL (ref 11.5–15.4)
LYMPHOCYTES # BLD AUTO: 2.38 THOUSANDS/ΜL (ref 0.6–4.47)
LYMPHOCYTES NFR BLD AUTO: 28 % (ref 14–44)
MCH RBC QN AUTO: 29.9 PG (ref 26.8–34.3)
MCHC RBC AUTO-ENTMCNC: 32.6 G/DL (ref 31.4–37.4)
MCV RBC AUTO: 92 FL (ref 82–98)
MONOCYTES # BLD AUTO: 0.8 THOUSAND/ΜL (ref 0.17–1.22)
MONOCYTES NFR BLD AUTO: 10 % (ref 4–12)
NEUTROPHILS # BLD AUTO: 5.01 THOUSANDS/ΜL (ref 1.85–7.62)
NEUTS SEG NFR BLD AUTO: 59 % (ref 43–75)
NRBC BLD AUTO-RTO: 0 /100 WBCS
P AXIS: 27 DEGREES
PLATELET # BLD AUTO: 271 THOUSANDS/UL (ref 149–390)
PMV BLD AUTO: 10.1 FL (ref 8.9–12.7)
POTASSIUM SERPL-SCNC: 3.5 MMOL/L (ref 3.5–5.3)
PR INTERVAL: 146 MS
PROT SERPL-MCNC: 7.1 G/DL (ref 6.4–8.2)
QRS AXIS: -16 DEGREES
QRSD INTERVAL: 78 MS
QT INTERVAL: 370 MS
QTC INTERVAL: 421 MS
RBC # BLD AUTO: 3.91 MILLION/UL (ref 3.81–5.12)
SODIUM SERPL-SCNC: 143 MMOL/L (ref 136–145)
SPECIMEN SOURCE: NORMAL
T WAVE AXIS: 9 DEGREES
TROPONIN I BLD-MCNC: 0 NG/ML (ref 0–0.08)
VENTRICULAR RATE: 78 BPM
WBC # BLD AUTO: 8.42 THOUSAND/UL (ref 4.31–10.16)

## 2018-01-04 PROCEDURE — 99285 EMERGENCY DEPT VISIT HI MDM: CPT

## 2018-01-04 PROCEDURE — 96375 TX/PRO/DX INJ NEW DRUG ADDON: CPT

## 2018-01-04 PROCEDURE — 80053 COMPREHEN METABOLIC PANEL: CPT | Performed by: EMERGENCY MEDICINE

## 2018-01-04 PROCEDURE — 72072 X-RAY EXAM THORAC SPINE 3VWS: CPT

## 2018-01-04 PROCEDURE — 73502 X-RAY EXAM HIP UNI 2-3 VIEWS: CPT

## 2018-01-04 PROCEDURE — 36415 COLL VENOUS BLD VENIPUNCTURE: CPT | Performed by: EMERGENCY MEDICINE

## 2018-01-04 PROCEDURE — 85025 COMPLETE CBC W/AUTO DIFF WBC: CPT | Performed by: EMERGENCY MEDICINE

## 2018-01-04 PROCEDURE — 71275 CT ANGIOGRAPHY CHEST: CPT

## 2018-01-04 PROCEDURE — 72100 X-RAY EXAM L-S SPINE 2/3 VWS: CPT

## 2018-01-04 PROCEDURE — 93005 ELECTROCARDIOGRAM TRACING: CPT

## 2018-01-04 PROCEDURE — 70450 CT HEAD/BRAIN W/O DYE: CPT

## 2018-01-04 PROCEDURE — 71046 X-RAY EXAM CHEST 2 VIEWS: CPT

## 2018-01-04 PROCEDURE — 72125 CT NECK SPINE W/O DYE: CPT

## 2018-01-04 PROCEDURE — 93005 ELECTROCARDIOGRAM TRACING: CPT | Performed by: EMERGENCY MEDICINE

## 2018-01-04 PROCEDURE — 84484 ASSAY OF TROPONIN QUANT: CPT

## 2018-01-04 PROCEDURE — 96374 THER/PROPH/DIAG INJ IV PUSH: CPT

## 2018-01-04 RX ORDER — TRAMADOL HYDROCHLORIDE 50 MG/1
50 TABLET ORAL EVERY 6 HOURS PRN
Status: DISCONTINUED | OUTPATIENT
Start: 2018-01-04 | End: 2018-01-06 | Stop reason: HOSPADM

## 2018-01-04 RX ORDER — ACETAMINOPHEN 325 MG/1
650 TABLET ORAL EVERY 6 HOURS PRN
Status: DISCONTINUED | OUTPATIENT
Start: 2018-01-04 | End: 2018-01-06 | Stop reason: HOSPADM

## 2018-01-04 RX ORDER — SODIUM CHLORIDE 9 MG/ML
125 INJECTION, SOLUTION INTRAVENOUS CONTINUOUS
Status: DISCONTINUED | OUTPATIENT
Start: 2018-01-04 | End: 2018-01-06 | Stop reason: HOSPADM

## 2018-01-04 RX ORDER — MIDODRINE HYDROCHLORIDE 5 MG/1
5 TABLET ORAL 3 TIMES DAILY
Status: ON HOLD | COMMUNITY
End: 2018-01-06

## 2018-01-04 RX ORDER — MORPHINE SULFATE 2 MG/ML
2 INJECTION, SOLUTION INTRAMUSCULAR; INTRAVENOUS EVERY 4 HOURS PRN
Status: DISCONTINUED | OUTPATIENT
Start: 2018-01-04 | End: 2018-01-06 | Stop reason: HOSPADM

## 2018-01-04 RX ORDER — LORAZEPAM 0.5 MG/1
0.5 TABLET ORAL 3 TIMES DAILY
Status: DISCONTINUED | OUTPATIENT
Start: 2018-01-04 | End: 2018-01-06 | Stop reason: HOSPADM

## 2018-01-04 RX ORDER — MIDODRINE HYDROCHLORIDE 5 MG/1
5 TABLET ORAL 3 TIMES DAILY
Status: DISCONTINUED | OUTPATIENT
Start: 2018-01-04 | End: 2018-01-05

## 2018-01-04 RX ORDER — LIDOCAINE 50 MG/G
1 PATCH TOPICAL DAILY
Status: DISCONTINUED | OUTPATIENT
Start: 2018-01-05 | End: 2018-01-06 | Stop reason: HOSPADM

## 2018-01-04 RX ORDER — ONDANSETRON 2 MG/ML
4 INJECTION INTRAMUSCULAR; INTRAVENOUS EVERY 6 HOURS PRN
Status: DISCONTINUED | OUTPATIENT
Start: 2018-01-04 | End: 2018-01-06 | Stop reason: HOSPADM

## 2018-01-04 RX ORDER — LAMOTRIGINE 100 MG/1
200 TABLET ORAL DAILY
Status: DISCONTINUED | OUTPATIENT
Start: 2018-01-05 | End: 2018-01-06 | Stop reason: HOSPADM

## 2018-01-04 RX ORDER — ASPIRIN 81 MG/1
324 TABLET, CHEWABLE ORAL ONCE
Status: COMPLETED | OUTPATIENT
Start: 2018-01-04 | End: 2018-01-04

## 2018-01-04 RX ORDER — ONDANSETRON 2 MG/ML
4 INJECTION INTRAMUSCULAR; INTRAVENOUS ONCE
Status: COMPLETED | OUTPATIENT
Start: 2018-01-04 | End: 2018-01-04

## 2018-01-04 RX ORDER — MORPHINE SULFATE 4 MG/ML
4 INJECTION, SOLUTION INTRAMUSCULAR; INTRAVENOUS ONCE
Status: COMPLETED | OUTPATIENT
Start: 2018-01-04 | End: 2018-01-04

## 2018-01-04 RX ORDER — FLUVOXAMINE MALEATE 50 MG/1
300 TABLET, COATED ORAL
Status: DISCONTINUED | OUTPATIENT
Start: 2018-01-04 | End: 2018-01-06 | Stop reason: HOSPADM

## 2018-01-04 RX ORDER — LORAZEPAM 1 MG/1
2 TABLET ORAL 2 TIMES DAILY
COMMUNITY
End: 2020-07-09 | Stop reason: SDUPTHER

## 2018-01-04 RX ORDER — ZONISAMIDE 25 MG/1
50 CAPSULE ORAL DAILY
Status: DISCONTINUED | OUTPATIENT
Start: 2018-01-05 | End: 2018-01-06 | Stop reason: HOSPADM

## 2018-01-04 RX ORDER — HYDROCORTISONE 10 MG/1
20 TABLET ORAL 2 TIMES DAILY
Status: DISCONTINUED | OUTPATIENT
Start: 2018-01-04 | End: 2018-01-06 | Stop reason: HOSPADM

## 2018-01-04 RX ORDER — METHOCARBAMOL 500 MG/1
1000 TABLET, FILM COATED ORAL 3 TIMES DAILY PRN
Status: DISCONTINUED | OUTPATIENT
Start: 2018-01-04 | End: 2018-01-06 | Stop reason: HOSPADM

## 2018-01-04 RX ORDER — GABAPENTIN 300 MG/1
600 CAPSULE ORAL 3 TIMES DAILY
Status: DISCONTINUED | OUTPATIENT
Start: 2018-01-04 | End: 2018-01-06 | Stop reason: HOSPADM

## 2018-01-04 RX ADMIN — ONDANSETRON 4 MG: 2 INJECTION INTRAMUSCULAR; INTRAVENOUS at 19:26

## 2018-01-04 RX ADMIN — TRAMADOL HYDROCHLORIDE 50 MG: 50 TABLET, FILM COATED ORAL at 22:57

## 2018-01-04 RX ADMIN — MIDODRINE HYDROCHLORIDE 5 MG: 5 TABLET ORAL at 21:42

## 2018-01-04 RX ADMIN — METHOCARBAMOL 1000 MG: 500 TABLET ORAL at 22:57

## 2018-01-04 RX ADMIN — FLUVOXAMINE MALEATE 300 MG: 50 TABLET, FILM COATED ORAL at 22:51

## 2018-01-04 RX ADMIN — MORPHINE SULFATE 2 MG: 2 INJECTION, SOLUTION INTRAMUSCULAR; INTRAVENOUS at 23:09

## 2018-01-04 RX ADMIN — ASPIRIN 81 MG 324 MG: 81 TABLET ORAL at 14:39

## 2018-01-04 RX ADMIN — MORPHINE SULFATE 4 MG: 4 INJECTION, SOLUTION INTRAMUSCULAR; INTRAVENOUS at 11:25

## 2018-01-04 RX ADMIN — ONDANSETRON 4 MG: 2 INJECTION INTRAMUSCULAR; INTRAVENOUS at 11:23

## 2018-01-04 RX ADMIN — HYDROCORTISONE 20 MG: 10 TABLET ORAL at 22:51

## 2018-01-04 RX ADMIN — IOHEXOL 85 ML: 350 INJECTION, SOLUTION INTRAVENOUS at 11:49

## 2018-01-04 RX ADMIN — SODIUM CHLORIDE 125 ML/HR: 0.9 INJECTION, SOLUTION INTRAVENOUS at 21:38

## 2018-01-04 RX ADMIN — GABAPENTIN 600 MG: 300 CAPSULE ORAL at 21:42

## 2018-01-04 RX ADMIN — LORAZEPAM 0.5 MG: 0.5 TABLET ORAL at 21:42

## 2018-01-04 NOTE — ED PROVIDER NOTES
History  Chief Complaint   Patient presents with    Syncope     Syncopal episode this morning at 0830; woke up on floor with R hip pain and neck pain  47-year-old female presents for evaluation of syncopal episode  Patient states that she felt like she was about to pass out and syncopal episode at home  She denies other prodromal symptoms  She states she is unsure how long she was down for when she woke up she had a headache and chest pain  Patient complains of sharp diffuse headache which is constant, nonradiating, without modifying factors, moderate intensity  Patient also complains of diffuse neck pain since the fall  Patient complains of sharp anterior chest pain which is constant, nonradiating without modifying factors  Patient also complains of right hip pain since the fall  Patient has persistent weakness, other traumatic injuries, dizziness, vertigo, cough, URI symptoms            Prior to Admission Medications   Prescriptions Last Dose Informant Patient Reported? Taking? ARIPiprazole (ABILIFY) 5 mg tablet   No No   Sig: Take 1 tablet by mouth daily   Patient taking differently: Take 15 mg by mouth daily     LORazepam (ATIVAN) 2 mg tablet   No No   Sig: Take 0 5 tablets by mouth every 6 (six) hours as needed for anxiety (2- 2mg during the day and 4mg at night) for up to 10 days   Patient taking differently: 2 mg 2mg BID  4mg HS    aspirin 325 mg tablet   Yes No   Sig: Take 325 mg by mouth daily  fluvoxaMINE (LUVOX) 100 mg tablet   Yes No   Sig: Take 300 mg by mouth daily at bedtime     furosemide (LASIX) 20 mg tablet   Yes No   Sig: Take 20 mg by mouth daily   gabapentin (NEURONTIN) 600 MG tablet   Yes No   Sig: Take 600 mg by mouth 3 (three) times a day     hydrocortisone (CORTEF) 10 mg tablet   No No   Sig: Take 1 tablet by mouth 3 (three) times a day   Patient taking differently: Take 20 mg by mouth 2 (two) times a day     lamoTRIgine (LaMICtal) 200 MG tablet   Yes No   Sig: Take 200 mg by mouth daily  methocarbamol (ROBAXIN) 500 mg tablet   No No   Sig: Take 2 tablets by mouth 3 (three) times a day as needed for muscle spasms   zonisamide (ZONEGRAN) 50 MG capsule   No No   Sig: Take 1 capsule by mouth daily      Facility-Administered Medications: None       Past Medical History:   Diagnosis Date    Adrenal insufficiency (Andrés's disease) (HCC)     Bipolar disorder     Cervical radiculopathy     Chronic back pain     DVT, lower extremity ([facility] [address] ) 1991    Fibromyalgia     History of TIAs     cannot remember details    Hypertension     Hypokalemia     Migraine     Psychiatric disorder     Anxiety, major depression, bipolar    Spinal stenosis     Syncope 2014    orthostatic hypotension       Past Surgical History:   Procedure Laterality Date    APPENDECTOMY      GASTRIC RESTRICTION SURGERY      Gastric Sleeve Nov 2015    HYSTERECTOMY      JOINT REPLACEMENT      Left Knee 2011 and Right Hip 2010       Family History   Problem Relation Age of Onset    Breast cancer Mother     Migraines Mother     Arthritis Mother     Kidney disease Father     Heart disease Father     Diabetes Father     Arthritis Father     Brain cancer Brother     Seizures Brother      I have reviewed and agree with the history as documented  Social History   Substance Use Topics    Smoking status: Former Smoker     Packs/day: 0 20     Types: Cigarettes     Quit date: 2008    Smokeless tobacco: Never Used    Alcohol use No        Review of Systems   Constitutional: Negative for activity change, appetite change, fatigue and fever  HENT: Negative for congestion, dental problem, ear pain, rhinorrhea and sore throat  Eyes: Negative for pain and redness  Respiratory: Negative for chest tightness, shortness of breath and wheezing  Cardiovascular: Positive for chest pain  Negative for palpitations     Gastrointestinal: Negative for abdominal pain, blood in stool, constipation, diarrhea, nausea and vomiting  Endocrine: Negative for cold intolerance and heat intolerance  Genitourinary: Negative for dysuria, frequency and hematuria  Musculoskeletal: Positive for neck pain  Negative for arthralgias and myalgias  Skin: Negative for color change, pallor and rash  Neurological: Positive for headaches  Negative for weakness and numbness  Hematological: Does not bruise/bleed easily  Psychiatric/Behavioral: Negative for agitation, confusion, hallucinations and suicidal ideas  Physical Exam  ED Triage Vitals   Temperature Pulse Respirations Blood Pressure SpO2   01/04/18 1008 01/04/18 1008 01/04/18 1008 01/04/18 1008 01/04/18 1008   99 9 °F (37 7 °C) 88 18 122/64 95 %      Temp Source Heart Rate Source Patient Position - Orthostatic VS BP Location FiO2 (%)   01/04/18 1008 01/04/18 1205 01/04/18 1205 01/04/18 1205 --   Temporal Monitor Lying Right arm       Pain Score       01/04/18 1008       Worst Possible Pain           Orthostatic Vital Signs  Vitals:    01/04/18 1008 01/04/18 1205 01/04/18 1230   BP: 122/64 140/64    Pulse: 88 61 62   Patient Position - Orthostatic VS:  Lying        Physical Exam   Constitutional: She appears well-developed and well-nourished  HENT:   Normocephalic/atraumatic  There is no facial bone tenderness palpation  No facial bone tenderness  No beard sign, no raccoon eyes, no hemotympanum  Nose is atraumatic  No evidence of epistaxis  Eyes:   Patient has painless full range of motion in both her eyes  Normal visual fields  No hyphema noted  Neck: No tracheal deviation present  Patient is nontender palpation over her , thoracic, lumbar spines  +ttp over C spineThere is no step-offs, no deformities  Cardiovascular:   No JVD noted  Heart sounds are normal  Regular rate rate and rhythm  Symmetric strong distal pulses  Pulmonary/Chest:   Chest wall is stable and nontender palpation  There is no crepitus noted  Patient has symmetric chest wall expansion   No flail segment noted clear and equal breath sounds bilateral    Abdominal:   No external signs of trauma noted on the abdomen/pelvis  Patient is nontender palpation of the abdomen  There is no rebound, guarding, CVA tenderness  Abdomen is nondistended  Pelvis stable  R hip ttp   Musculoskeletal:   Right upper extremity has full range of motion without pain  There is no tenderness palpation noted  Patient has no external signs of trauma  Patient is neurovascularly intact in this extremity  Left upper extremity has full range of motion without pain  There is no tenderness palpation noted  Patient has no external signs of trauma  Patient is neurovascularly intact in this extremity  Right lower extremity has full range of motion without pain  There is no tenderness palpation noted  Patient has no external signs of trauma  Patient is neurovascularly intact in this extremity  Left Lower  extremity has full range of motion without pain  There is no tenderness palpation noted  Patient has no external signs of trauma  Patient is neurovascularly intact in this extremity  Neurological:   Alert and oriented ×3  Normal mental status exam  Normal cranial nerves II through XII  Normal sensation and strength throughout  Normal cerebellar exam  GCS 15  Skin:   No external signs of trauma  Psychiatric: She has a normal mood and affect  Her behavior is normal  Judgment normal    Nursing note and vitals reviewed        ED Medications  Medications   ondansetron (ZOFRAN) injection 4 mg (4 mg Intravenous Given 1/4/18 1123)   morphine (PF) 4 mg/mL injection 4 mg (4 mg Intravenous Given 1/4/18 1125)   iohexol (OMNIPAQUE) 350 MG/ML injection (MULTI-DOSE) 85 mL (85 mL Intravenous Given 1/4/18 1149)       Diagnostic Studies  Results Reviewed     Procedure Component Value Units Date/Time    Comprehensive metabolic panel [[id number]] Collected:  01/04/18 1018    Lab Status:  Final result Specimen:  Blood from Arm, Left Updated:  01/04/18 1040 Sodium 143 mmol/L      Potassium 3 5 mmol/L      Chloride 107 mmol/L      CO2 27 mmol/L      Anion Gap 9 mmol/L      BUN 12 mg/dL      Creatinine 0 96 mg/dL      Glucose 112 mg/dL      Calcium 9 0 mg/dL      AST 18 U/L      ALT 18 U/L      Alkaline Phosphatase 94 U/L      Total Protein 7 1 g/dL      Albumin 3 6 g/dL      Total Bilirubin 0 28 mg/dL      eGFR 65 ml/min/1 73sq m     Narrative:         National Kidney Disease Education Program recommendations are as follows:  GFR calculation is accurate only with a steady state creatinine  Chronic Kidney disease less than 60 ml/min/1 73 sq  meters  Kidney failure less than 15 ml/min/1 73 sq  meters  POCT troponin [[id number]]  (Normal) Collected:  01/04/18 1021    Lab Status:  Final result Updated:  01/04/18 1035     POC Troponin I 0 00 ng/ml      Specimen Type VENOUS    Narrative:         [employer] i-Stat handheld analyzer 99% cutoff is > 0 08ng/mL in network Emergency Departments    o cTnI 99% cutoff is useful only when applied to patients in the clinical setting of myocardial ischemia  o cTnI 99% cutoff should be interpreted in the context of clinical history, ECG findings and possibly cardiac imaging to establish correct diagnosis  o cTnI 99% cutoff may be suggestive but clearly not indicative of a coronary event without the clinical setting of myocardial ischemia      CBC and differential [[id number]]  (Normal) Collected:  01/04/18 1018    Lab Status:  Final result Specimen:  Blood from Arm, Left Updated:  01/04/18 1026     WBC 8 42 Thousand/uL      RBC 3 91 Million/uL      Hemoglobin 11 7 g/dL      Hematocrit 35 9 %      MCV 92 fL      MCH 29 9 pg      MCHC 32 6 g/dL      RDW 14 8 %      MPV 10 1 fL      Platelets 118 Thousands/uL      nRBC 0 /100 WBCs      Neutrophils Relative 59 %      Lymphocytes Relative 28 %      Monocytes Relative 10 %      Eosinophils Relative 2 %      Basophils Relative 1 %      Neutrophils Absolute 5 01 Thousands/µL      Lymphocytes Absolute 2 38 Thousands/µL      Monocytes Absolute 0 80 Thousand/µL      Eosinophils Absolute 0 19 Thousand/µL      Basophils Absolute 0 04 Thousands/µL                  X-ray chest 2 views   ED Interpretation by Frances Hanson MD (01/04 1348)   Primary reviewed: no acute abnormality      Final Result by Frank Ji DO (01/04 1054)      No active pulmonary disease  Workstation performed: [id number]         CT head without contrast   ED Interpretation by Frances Hanson MD (01/04 1348)   No acute intracranial abnormality  Final Result by Diana Guillen MD (01/04 1222)      No acute intracranial abnormality  Workstation performed: [id number]         CT cervical spine without contrast   ED Interpretation by Frances Hanson MD (01/04 1347)   No cervical spine fracture or traumatic malalignment  Loss of the normal cervical lordosis which is likely secondary to patient positioning, cervical collar, or muscle spasm  Ligamentous injury cannot be entirely excluded  Final Result by Diana Guillen MD (01/04 1230)      No cervical spine fracture or traumatic malalignment  Loss of the normal cervical lordosis which is likely secondary to patient positioning, cervical collar, or muscle spasm  Ligamentous injury cannot be entirely excluded  Workstation performed: [id number]         CTA ED chest PE study   ED Interpretation by Frances Hanson MD (01/04 1347)   1   Negative for acute or chronic pulmonary thromboembolism  2   No acute findings in the chest       Final Result by Timothy Kay MD (01/04 1245)   1  Negative for acute or chronic pulmonary thromboembolism     2   No acute findings in the chest          Workstation performed: [id number]         XR hip/pelv 2-3 vws right   ED Interpretation by Frances Hanson MD (01/04 1347)   Satisfactory appearance of the right hip prosthesis   No pelvic or hip fracture seen   Degenerative arthritis in the lower lumbar spine      Final Result by Lázaro Vincent MD (01/04 1324)      Satisfactory appearance of the right hip prosthesis  No pelvic or hip fracture seen  Degenerative arthritis in the lower lumbar spine         Workstation performed: [id number]                    Procedures  Procedures       Phone Contacts  ED Phone Contact    ED Course  ED Course          HEART Risk Score    Flowsheet Row Most Recent Value   History  1 Filed at: 01/04/2018 1418   ECG  0 Filed at: 01/04/2018 1418   Age  1 Filed at: 01/04/2018 1418   Risk Factors  2 Filed at: 01/04/2018 1418   Troponin  0 Filed at: 01/04/2018 1418   Heart Score Risk Calculator   History  1 Filed at: 01/04/2018 1418   ECG  0 Filed at: 01/04/2018 1418   Age  1 Filed at: 01/04/2018 1418   Risk Factors  2 Filed at: 01/04/2018 1418   Troponin  0 Filed at: 01/04/2018 1418   HEART Score  4 Filed at: 01/04/2018 1418   HEART Score  4 Filed at: 01/04/2018 1418                            MDM  Number of Diagnoses or Management Options  Acute headache:   Chest pain, rule out acute myocardial infarction:   Neck pain:   Right hip pain:   Syncope:   Diagnosis management comments: Syncope-will CT head rule out acute CNS pathology, chest x-ray, EKG/cardiac labs, CT PE study, admit  Chest pain status post fall which is not reproducible-will do cardiac workup involving CT chest RO pulmonary embolism, aspirin head CT is negative  Right hip pain status post fall-will do x-ray to rule out fracture/dislocation  Acute traumatic headache-will CT head rule out acute CNS pathology, pain control, admit      CritCare Time    Disposition  Final diagnoses:   Syncope   Chest pain, rule out acute myocardial infarction   Right hip pain   Neck pain   Acute headache     Time reflects when diagnosis was documented in both MDM as applicable and the Disposition within this note     Time User Action Codes Description Comment    1/4/2018  2:17 PM Marcos Alves Add [R55] Syncope     1/4/2018  2:17 PM Jose More Add [R07 9] Chest pain, rule out acute myocardial infarction     1/4/2018  2:17 PM Vitaliy Rojas Add [Z78 637] Right hip pain     1/4/2018  2:17 PM Vitaliy Rojas Add [M54 2] Neck pain     1/4/2018  2:18 PM Kaycee More Add [R51] Acute headache       ED Disposition     ED Disposition Condition Comment    Admit  Case was discussed with Dr Richardson Hernadez and the patient's admission status was agreed to be Admission Status: inpatient status to the service of Dr Richardson Hernadez   Follow-up Information    None       Patient's Medications   Discharge Prescriptions    No medications on file     No discharge procedures on file      ED Provider  Electronically Signed by           Asael Ruelas MD  01/04/18 8113

## 2018-01-04 NOTE — ED NOTES
Patient reporting feeling nauseous after eating  Paged SLIM at this time   Awaiting call back     Mara Tellez RN  01/04/18 0332

## 2018-01-04 NOTE — ED NOTES
Patient requesting something to eat since patient is being admitted  The ED provider put in diet order as NPO but nursing staff unsure why  Spoke with Haley GRIMM who states that the patient can have a regular house diet   Haley also states that KEITH is aware of patients admission status and will be in to see the patient as soon as possible      Poonam Barboza RN  01/04/18 4934

## 2018-01-04 NOTE — ED NOTES
Patient reports "blacking out" after syncope  Denies hitting head  Reports onset of 7/10 left sided chest pain onset after fall  Currently c/o nausea, headache, neck pain, and chest pain  States this has happened to her in the past and "they told me it was because my blood pressure drops"        Martha Grigsby, RN  01/04/18 1010

## 2018-01-05 ENCOUNTER — APPOINTMENT (OUTPATIENT)
Dept: NON INVASIVE DIAGNOSTICS | Facility: HOSPITAL | Age: 58
End: 2018-01-05
Payer: COMMERCIAL

## 2018-01-05 LAB
AMPHETAMINES SERPL QL SCN: NEGATIVE
BARBITURATES UR QL: NEGATIVE
BENZODIAZ UR QL: NEGATIVE
COCAINE UR QL: NEGATIVE
METHADONE UR QL: NEGATIVE
OPIATES UR QL SCN: POSITIVE
PCP UR QL: NEGATIVE
THC UR QL: NEGATIVE

## 2018-01-05 PROCEDURE — 80307 DRUG TEST PRSMV CHEM ANLYZR: CPT | Performed by: PSYCHIATRY & NEUROLOGY

## 2018-01-05 PROCEDURE — 93880 EXTRACRANIAL BILAT STUDY: CPT

## 2018-01-05 RX ORDER — MIDODRINE HYDROCHLORIDE 5 MG/1
10 TABLET ORAL 3 TIMES DAILY
Status: DISCONTINUED | OUTPATIENT
Start: 2018-01-05 | End: 2018-01-06 | Stop reason: HOSPADM

## 2018-01-05 RX ADMIN — LIDOCAINE 1 PATCH: 50 PATCH CUTANEOUS at 08:23

## 2018-01-05 RX ADMIN — HYDROCORTISONE 20 MG: 10 TABLET ORAL at 08:25

## 2018-01-05 RX ADMIN — GABAPENTIN 600 MG: 300 CAPSULE ORAL at 08:24

## 2018-01-05 RX ADMIN — LORAZEPAM 0.5 MG: 0.5 TABLET ORAL at 17:22

## 2018-01-05 RX ADMIN — ARIPIPRAZOLE 15 MG: 10 TABLET ORAL at 08:24

## 2018-01-05 RX ADMIN — ONDANSETRON 4 MG: 2 INJECTION INTRAMUSCULAR; INTRAVENOUS at 20:51

## 2018-01-05 RX ADMIN — ZONISAMIDE 50 MG: 25 CAPSULE ORAL at 08:24

## 2018-01-05 RX ADMIN — MORPHINE SULFATE 2 MG: 2 INJECTION, SOLUTION INTRAMUSCULAR; INTRAVENOUS at 08:24

## 2018-01-05 RX ADMIN — LAMOTRIGINE 200 MG: 100 TABLET ORAL at 08:25

## 2018-01-05 RX ADMIN — MORPHINE SULFATE 2 MG: 2 INJECTION, SOLUTION INTRAMUSCULAR; INTRAVENOUS at 23:42

## 2018-01-05 RX ADMIN — SODIUM CHLORIDE 125 ML/HR: 0.9 INJECTION, SOLUTION INTRAVENOUS at 17:22

## 2018-01-05 RX ADMIN — GABAPENTIN 600 MG: 300 CAPSULE ORAL at 17:22

## 2018-01-05 RX ADMIN — MIDODRINE HYDROCHLORIDE 5 MG: 5 TABLET ORAL at 08:24

## 2018-01-05 RX ADMIN — ENOXAPARIN SODIUM 40 MG: 40 INJECTION SUBCUTANEOUS at 08:25

## 2018-01-05 RX ADMIN — FLUVOXAMINE MALEATE 300 MG: 50 TABLET, FILM COATED ORAL at 21:34

## 2018-01-05 RX ADMIN — MORPHINE SULFATE 2 MG: 2 INJECTION, SOLUTION INTRAMUSCULAR; INTRAVENOUS at 14:14

## 2018-01-05 RX ADMIN — TRAMADOL HYDROCHLORIDE 50 MG: 50 TABLET, FILM COATED ORAL at 07:43

## 2018-01-05 RX ADMIN — MIDODRINE HYDROCHLORIDE 5 MG: 5 TABLET ORAL at 17:22

## 2018-01-05 RX ADMIN — MORPHINE SULFATE 2 MG: 2 INJECTION, SOLUTION INTRAMUSCULAR; INTRAVENOUS at 03:38

## 2018-01-05 RX ADMIN — LORAZEPAM 0.5 MG: 0.5 TABLET ORAL at 08:24

## 2018-01-05 RX ADMIN — SODIUM CHLORIDE 125 ML/HR: 0.9 INJECTION, SOLUTION INTRAVENOUS at 06:12

## 2018-01-05 RX ADMIN — MIDODRINE HYDROCHLORIDE 10 MG: 5 TABLET ORAL at 21:34

## 2018-01-05 RX ADMIN — LORAZEPAM 0.5 MG: 0.5 TABLET ORAL at 21:34

## 2018-01-05 RX ADMIN — MORPHINE SULFATE 2 MG: 2 INJECTION, SOLUTION INTRAMUSCULAR; INTRAVENOUS at 19:32

## 2018-01-05 RX ADMIN — HYDROCORTISONE 20 MG: 10 TABLET ORAL at 17:22

## 2018-01-05 RX ADMIN — METHOCARBAMOL 1000 MG: 500 TABLET ORAL at 07:43

## 2018-01-05 RX ADMIN — GABAPENTIN 600 MG: 300 CAPSULE ORAL at 21:34

## 2018-01-05 NOTE — H&P
H&P- Beronica Michaels 1960, 59 y o  female MRN: [id number]    Unit/Bed#: E4 -01 Encounter: [id number]    Primary Care Provider: Patricia Serrato MD   Date and time admitted to hospital: 1/4/2018 10:09 AM            History and Physical - [facility]    Patient Information: Beronica Michaels 59 y o  female MRN: [id number]  Unit/Bed#: E4 -01 Encounter: [id number]  Admitting Physician: Rodriguez Clements PA-C  PCP: Patricia Serrato MD  Date of Admission:  01/04/18    Assessment/Plan:    Hospital Problem List:     Principal Problem:    Syncope  Active Problems:    Chest wall pain    Migraine    Neck pain    Low back pain    Bipolar depression (HCC)    Adrenal insufficiency (Andrés's disease) ([facility] [address] )    Fibromyalgia    HLD (hyperlipidemia)    Orthostatic hypotension      * Syncope   Assessment & Plan    Patient with a history of syncope and collapse, previously suspected to be secondary to polypharmacy and orthostasis seen in examined for same in 08/2017  Today CT head negative for any ischemia, midline shift, hemorrhage or mass, EKG today NSR w/o QTC prolongation or evidence of HB  TTE w/LVEF 55-60% and no significant valvular disease in August 2017, MRI MRA in May of 2017 negative for any significant posterior circulation or internal carotid disease prior sinus bradycardia noted in the 30s on last admission was evaluated by cardiology and felt pt would benefit from OP holter monitor but did not need loop recorder placement  Pt reports BP at home was SBP of 90 after episode of syncope  Will admit to observation status, monitor orthostatics, check carotid duplex to assess extracranial carotids given recurrence, and will kindly reconsult neurology for any recommendations        Chest wall pain   Assessment & Plan    Left sided w/radiation to shoulder status post fall  PRAVEENA score 0  CTA PE negative for PE, dissection, stable does 5mm nodule from 2015 suggesting benign process  EKG NSR w/o ST changes T wave inversions in 2+ leads  Troponin 0  rec'd asa 325mg once in ED  Will check lipid panel/hgb a1c will monitor on telemetry for #1, trend troponins for completeness  If negative no additional cardiac workup recommended at this time        Low back pain   Assessment & Plan    Acute Midline and right low back in soft tissue nonradiating s/p fall as well as in mid back  Pt reports difficulty raising right leg 2* pain in back/hip  Xray hip unremarkable, prosthesis is in place w/o fracture  Pt does have ABNORMAL Torres's test (concerning for poor effort with SLR by grasping contralateral calcaneus)  Will check Xray lumbar spine/thoracic spine to r/o acute fx  Analgesics as above          Neck pain   Assessment & Plan    S/p fall  Indiscriminate pain in soft tissues/midline spine  CT cervical spine negative for any acute fracture, dislocation or critical spinal stenosis but does demonstrate multilevel degenerative changes noted  rec'd morphine 4mg in ED will continue OP robaxin, will start tramadol prn, lidocaine patch scheduled APAP, and ice/heat continue neurontin  Pt limited by pain medications due to hx of bariatric surgery (no NSAIDs)        Migraine   Assessment & Plan    Hx of migraines  Pt reports mild HA s/p fall  Will encourage APAP and minimize narcotics to minimize affect of rebound headache        Orthostatic hypotension   Assessment & Plan    Continue midodrine check orthostatics q shift        HLD (hyperlipidemia)   Assessment & Plan    Diet controlled  LDL 3 mo prior was 105  Will defer statin at this time        Fibromyalgia   Assessment & Plan    Continue robaxin        Adrenal insufficiency (Andrés's disease) (HCC)   Assessment & Plan    No s/sx of infection  Continue cortef        Bipolar depression (HCC)   Assessment & Plan    Continue luvox, ativan, abilify          ·     VTE Prophylaxis: Enoxaparin (Lovenox)  / sequential compression device   Code Status: Full Code  POLST: There is no POLST form on file for this patient (pre-hospital)    Anticipated Length of Stay:  Patient will be admitted on an Observation basis with an anticipated length of stay of  Less than 2 midnights  Justification for Hospital Stay: syncope    Total Time for Visit, including Counseling / Coordination of Care: 45 minutes  Greater than 50% of this total time spent on direct patient counseling and coordination of care  Chief Complaint:     Unwitnes syncope and collapse, chest pain, neck pain, back pain    History of Present Illness:    Dannielle Cha is a 59 y o  female who presents with PMH of orthostatic hypotension, fibromyalgia, migraines, coming in the ED for syncope and collapse  This was unwitnessed in her home  She reported that earlier today she was getting up from living room and had been walking to the kitchen when she got black floaters in her eyes and then she collapsed to the floor  She is uncertain how long she was out  After she woke up and realized that she was in pain in her neck and her back and also in her left anterior chest radiating to the left shoulder  She then came to the ED for evaluation  In August of 2017 she was admitted for the same  She was evaluated by TTE and CT scan  She has previous evaluations for MRA MRI due to complex migraines  On her prior admission for syncope, this is felt to be secondary to polypharmacy as the patient was on narcotics as well as Lasix and her orthostasis  She did have bradycardia which was sinus in nature and was seen by Cardiology who recommended an outpatient Holter monitor  In regard to the syncope, the patient denies any other presyncopal symptoms such as diaphoresis, nausea vomiting chest pain palpitations  She reported that she waits 1-2 minutes before walking after standing up and does leg pumps after standing to minimize orthostasis    She said she checked her blood pressure after she fell and found that her systolic wa 90 mmHg by arm cuff  In regard to the neck and back pain, this is the soft tissue within it the neck bed she reports pain with active range of motion  She reports the pain in her back is primarily in the low back on the right side near her hip and does not radiate  She also has tenderness in her mid back and upper back as well  Primarily on the right side but also midline  She has no radicular pain or bowel/bladder incontinence  In respect to the patient's chest pain she reports is worse when she presses on her anterior chest wall  She reports she has never had pain like this before  She reports that she has a very minimal smoking history ("less than a pack a week for 9 years"), no history of hyperlipidemia diabetes, no history of CAD, although her father did have a heart attack in his 46s  Review of Systems:    Review of Systems   Constitutional: Negative for appetite change, chills and fever  HENT: Negative for congestion and sore throat  Eyes: Negative for visual disturbance  Respiratory: Negative for cough and shortness of breath  Cardiovascular: Positive for chest pain  Negative for palpitations  Gastrointestinal: Negative for abdominal pain, nausea and vomiting  Musculoskeletal: Positive for back pain, gait problem and neck pain  Neurological: Positive for syncope  Negative for weakness and light-headedness  All other systems reviewed and are negative        Past Medical and Surgical History:     Past Medical History:   Diagnosis Date    Adrenal insufficiency ([place]'s disease) ([facility] [address] )     Bipolar disorder     Cervical radiculopathy     Chronic back pain     [facility]    Fibromyalgia     History of TIAs     cannot remember details    Hypertension     Hypokalemia     Migraine     Psychiatric disorder     Anxiety, major depression, bipolar    Spinal stenosis     Syncope 2014    orthostatic hypotension       Past Surgical History:   Procedure Laterality Date    APPENDECTOMY      GASTRIC RESTRICTION SURGERY      Gastric Sleeve Nov 2015    HYSTERECTOMY      JOINT REPLACEMENT      Left Knee 2011 and Right Hip 2010       Meds/Allergies:    Prior to Admission medications    Medication Sig Start Date End Date Taking? Authorizing Provider   ARIPiprazole (ABILIFY) 5 mg tablet Take 1 tablet by mouth daily  Patient taking differently: Take 15 mg by mouth daily   8/4/17  Yes Tila Tinoco DO   aspirin 325 mg tablet Take 325 mg by mouth daily  Yes Historical Provider, MD   fluvoxaMINE (LUVOX) 100 mg tablet Take 300 mg by mouth daily at bedtime     Yes Historical Provider, MD   gabapentin (NEURONTIN) 600 MG tablet Take 600 mg by mouth 3 (three) times a day  Yes Historical Provider, MD   hydrocortisone (CORTEF) 10 mg tablet Take 1 tablet by mouth 3 (three) times a day  Patient taking differently: Take 20 mg by mouth 2 (two) times a day   8/4/17  Yes Tila Tinoco DO   lamoTRIgine (LaMICtal) 200 MG tablet Take 200 mg by mouth daily  Yes Historical Provider, MD   LORazepam (ATIVAN) 1 mg tablet Take 0 5 mg by mouth 3 (three) times a day   Yes Historical Provider, MD   methocarbamol (ROBAXIN) 500 mg tablet Take 2 tablets by mouth 3 (three) times a day as needed for muscle spasms 8/4/17  Yes Tila Tinoco DO   midodrine (PROAMATINE) 5 mg tablet Take 5 mg by mouth 3 (three) times a day   Yes Historical Provider, MD   zonisamide (ZONEGRAN) 50 MG capsule Take 1 capsule by mouth daily 8/4/17  Yes Tila Tinoco DO   furosemide (LASIX) 20 mg tablet Take 20 mg by mouth daily  1/4/18  Historical Provider, MD   LORazepam (ATIVAN) 2 mg tablet Take 0 5 tablets by mouth every 6 (six) hours as needed for anxiety (2- 2mg during the day and 4mg at night) for up to 10 days  Patient not taking: Reported on 1/4/2018 8/4/17 1/4/18  Angelica Hernandes DO     I have reviewed home medications with patient personally  Allergies:    Allergies   Allergen Reactions    Ketorolac Itching and Other (See Comments)     [toradol] Itching, hives    Mushroom Extract Complex     Risperidone      Other reaction(s): Unknown Reaction    Temazepam Hives       Social History:     Marital Status: /Civil Union   Occupation:   Patient Pre-hospital Living Situation:   Patient Pre-hospital Level of Mobility:   Patient Pre-hospital Diet Restrictions:   Substance Use History:   History   Alcohol Use No     History   Smoking Status    Former Smoker    Packs/day: 0 20    Types: Cigarettes    Quit date: 2008   Smokeless Tobacco    Never Used     History   Drug Use No       Family History:    father-MI, brother-seizure    Physical Exam:     Vitals:   Blood Pressure: 102/53 (01/04/18 2013)  Pulse: 71 (01/04/18 2013)  Temperature: 98 5 °F (36 9 °C) (01/04/18 1427)  Temp Source: Oral (01/04/18 1427)  Respirations: 18 (01/04/18 2013)  SpO2: 93 % (01/04/18 2013)    Physical Exam   Constitutional: She appears well-developed and well-nourished  No distress  HENT:   Head: Normocephalic and atraumatic  Right Ear: External ear normal    Left Ear: External ear normal    Mouth/Throat: Oropharynx is clear and moist  No oropharyngeal exudate  Eyes: Conjunctivae are normal  Right eye exhibits no discharge  Left eye exhibits no discharge  No scleral icterus  Neck: Normal range of motion  Cardiovascular: Normal rate, regular rhythm and normal heart sounds  Exam reveals no gallop and no friction rub  No murmur heard  Pulmonary/Chest: Effort normal and breath sounds normal  No respiratory distress  She has no wheezes  She has no rales  Abdominal: Soft  She exhibits no distension  There is no tenderness  There is no rebound and no guarding  Musculoskeletal: She exhibits tenderness (Tenderness to palpation of cervical spine equal in midline/soft tissue, pt in mid thoracic/lumbar spine worse in soft tissue on the right, particularly Right low back  pt w/poor effort on SLR on right (abnormal Torres)  )  Neurological: She is alert  Skin: Skin is warm and dry  She is not diaphoretic  No erythema  Psychiatric: She has a normal mood and affect  Vitals reviewed  Additional Data:     Lab Results: I have personally reviewed pertinent reports  Results from last 7 days  Lab Units 01/04/18  1018   WBC Thousand/uL 8 42   HEMOGLOBIN g/dL 11 7   HEMATOCRIT % 35 9   PLATELETS Thousands/uL 271   NEUTROS PCT % 59   LYMPHS PCT % 28   MONOS PCT % 10   EOS PCT % 2       Results from last 7 days  Lab Units 01/04/18  1018   SODIUM mmol/L 143   POTASSIUM mmol/L 3 5   CHLORIDE mmol/L 107   CO2 mmol/L 27   BUN mg/dL 12   CREATININE mg/dL 0 96   CALCIUM mg/dL 9 0   TOTAL PROTEIN g/dL 7 1   BILIRUBIN TOTAL mg/dL 0 28   ALK PHOS U/L 94   ALT U/L 18   AST U/L 18   GLUCOSE RANDOM mg/dL 112           Imaging: I have personally reviewed pertinent reports  X-ray Chest 2 Views    Result Date: 1/4/2018  Narrative: CHEST INDICATION:  Chest pain  COMPARISON:  Chest radiographs July 29, 2017 VIEWS:  Frontal and lateral projections IMAGES:  2 FINDINGS:     Heart shadow appears unremarkable  Atherosclerotic vascular calcifications are noted  The lungs are clear  Stable lingular scarring  No pneumothorax or pleural effusion  Age-appropriate degenerative changes are noted in the spine  Osseous structures appear otherwise within normal limits  Impression: No active pulmonary disease  Workstation performed: [id number]     Xr Hip/pelv 2-3 Vws Right    Result Date: 1/4/2018  Narrative: RIGHT HIP INDICATION:  Patient fell  Pain COMPARISON: August 9, 2017 VIEWS: AP pelvis and 2 coned down views of the hip IMAGES:  3 FINDINGS: There is no acute fracture or dislocation  Hip prosthesis appears satisfactory  No lucency around the hardware  No significant change from previous exam  No lytic or blastic bone lesions are seen    There is advanced chronic degenerative arthritis of the lower lumbar spine which is stable from the prior study  There is contrast enhanced urine within the bladder and in the midline in the perineal area perhaps within the urethra or between the labia  Impression: Satisfactory appearance of the right hip prosthesis  No pelvic or hip fracture seen  Degenerative arthritis in the lower lumbar spine Workstation performed: [id number]     Ct Head Without Contrast    Result Date: 1/4/2018  Narrative: CT BRAIN - WITHOUT CONTRAST INDICATION:  Syncope, headache COMPARISON:  12/21/2017 TECHNIQUE:  CT examination of the brain was performed  In addition to axial images, coronal reformatted images were created and submitted for interpretation  Radiation dose length product (DLP) for this visit:  1048 mGy-cm   This examination, like all CT scans performed in the [facility], was performed utilizing techniques to minimize radiation dose exposure, including the use of iterative reconstruction and automated exposure control  IMAGE QUALITY:  Diagnostic  FINDINGS:  PARENCHYMA:  No intracranial mass, mass effect or midline shift  No CT signs of acute infarction  There is no parenchymal hemorrhage  VENTRICLES AND EXTRA-AXIAL SPACES:  Normal for patient's age  VISUALIZED ORBITS AND PARANASAL SINUSES:  Unremarkable  CALVARIUM AND EXTRACRANIAL SOFT TISSUES:   Normal      Impression: No acute intracranial abnormality  Workstation performed: [id number]     Ct Head Without Contrast    Result Date: 12/21/2017  Narrative: CT BRAIN - WITHOUT CONTRAST INDICATION:  Syncope COMPARISON:  October 8, 2017 TECHNIQUE:  CT examination of the brain was performed  In addition to axial images, coronal reformatted images were created and submitted for interpretation  Radiation dose length product (DLP) for this visit:  1025 mGy-cm     This examination, like all CT scans performed in the [facility], was performed utilizing techniques to minimize radiation dose exposure, including the use of iterative reconstruction and automated exposure control  IMAGE QUALITY:  Diagnostic  FINDINGS:  PARENCHYMA:  Decreased attenuation is noted in the supratentorial white matter demonstrating an appearance most consistent with mild microangiopathic change  No intracranial mass, mass effect or midline shift  No CT signs of acute infarction  There is no parenchymal hemorrhage  VENTRICLES AND EXTRA-AXIAL SPACES:  Age-appropriate volume loss is noted  No hydrocephalus  VISUALIZED ORBITS AND PARANASAL SINUSES:  Orbits appear normal   Mild scattered sinus mucosal thickening is noted  No fluid levels are seen  CALVARIUM AND EXTRACRANIAL SOFT TISSUES:   Normal      Impression: No acute intracranial abnormality  Workstation performed: [id number]     Ct Cervical Spine Without Contrast    Result Date: 1/4/2018  Narrative: CT CERVICAL SPINE - WITHOUT CONTRAST INDICATION: Syncope, neck pain COMPARISON: None  TECHNIQUE:  CT examination of the cervical spine was performed without intravenous contrast   Contiguous axial images were obtained  Sagittal and coronal reconstructions were performed  Radiation dose length product (DLP) for this visit:  492 mGy-cm   This examination, like all CT scans performed in the [facility], was performed utilizing techniques to minimize radiation dose exposure, including the use of iterative reconstruction and automated exposure control  IMAGE QUALITY:  Diagnostic  FINDINGS: ALIGNMENT:  Normal alignment of the cervical spine  No subluxation  There is loss of the normal cervical lordosis  VERTEBRAL BODIES:  No fracture  DEGENERATIVE CHANGES:  Mild multilevel cervical degenerative changes are noted without critical central canal stenosis   PREVERTEBRAL AND PARASPINAL SOFT TISSUES: Normal      Incidental discovery of one or more thyroid nodule(s) measuring less than 1 5 cm and without suspicious features is noted in this patient who is above 28years old; according to guidelines published in the February 2015 white paper on incidental thyroid nodules in the Journal of the [employer] of Radiology [facility]), no further evaluation is recommended  THORACIC INLET:  Normal      Impression: No cervical spine fracture or traumatic malalignment  Loss of the normal cervical lordosis which is likely secondary to patient positioning, cervical collar, or muscle spasm  Ligamentous injury cannot be entirely excluded  Workstation performed: [id number]     Ct Cervical Spine Without Contrast    Result Date: 12/21/2017  Narrative: CT CERVICAL SPINE - WITHOUT CONTRAST INDICATION: Status post fall, trauma COMPARISON: August 9, 2017 TECHNIQUE:  CT examination of the cervical spine was performed without intravenous contrast   Contiguous axial images were obtained  Sagittal and coronal reconstructions were performed  Radiation dose length product (DLP) for this visit:  428 mGy-cm   This examination, like all CT scans performed in the [facility], was performed utilizing techniques to minimize radiation dose exposure, including the use of iterative reconstruction and automated exposure control  IMAGE QUALITY:  Diagnostic  FINDINGS: ALIGNMENT:  There is straightening of normal cervical lordosis  No subluxation or compression deformity  VERTEBRAL BODIES:  No fracture  DEGENERATIVE CHANGES:  Mild multilevel cervical degenerative changes are noted without critical central canal stenosis  PREVERTEBRAL AND PARASPINAL SOFT TISSUES: Subcentimeter left thyroid nodule  Incidental subcentimeter left thyroid nodule identified on this CT  According to guidelines published in the February 2015 white paper on incidental thyroid nodules in the Journal of the [employer] of Radiology [facility]), because the nodule(s) are less than 1 cm and without suspicious features, no further evaluation is recommended  THORACIC INLET:  Normal      Impression: No acute fracture or dislocation  Workstation performed: [id number]     Cta Ed Chest Pe Study    Result Date: 1/4/2018  Narrative: CTA - CHEST WITH IV CONTRAST - PULMONARY ANGIOGRAM INDICATION: 68-year-old woman presenting with left-sided chest pain and syncope  COMPARISON: Chest CT 4/4/2015  TECHNIQUE: CTA examination of the chest was performed using angiographic technique according to a protocol specifically tailored to evaluate for pulmonary embolism  Reformatted images were created in axial, sagittal, and coronal planes  In addition, coronal 3D MIP postprocessing was performed on the acquisition scanner  Radiation dose length product (DLP) for this visit:  494 mGy-cm   This examination, like all CT scans performed in the [facility], was performed utilizing techniques to minimize radiation dose exposure, including the use of iterative reconstruction and automated exposure control  IV Contrast:  85 mL of iohexol (OMNIPAQUE)          FINDINGS: PULMONARY ARTERIAL TREE:  No pulmonary embolus is seen  LUNGS:  Minimal scattered atelectasis  5 mm nodule left costophrenic sulcus is unchanged since 4/4/2015, radiographically benign given the stability  There is no tracheal or endobronchial lesion  PLEURA:  Unremarkable  HEART/AORTA: Normal heart size  The thoracic aorta has normal course, caliber and contour without evidence of aneurysm or dissection  MEDIASTINUM AND JEZ:  Unremarkable  CHEST WALL AND LOWER NECK:       Normal  VISUALIZED STRUCTURES IN THE UPPER ABDOMEN:  Unremarkable  OSSEOUS STRUCTURES:  No acute fracture or destructive osseous lesion  Impression: 1  Negative for acute or chronic pulmonary thromboembolism   2   No acute findings in the chest  Workstation performed: [id number]       EKG, Pathology, and Other Studies Reviewed on Admission:   · EKG: NSR w/o ST changes or T wave inversions in 2+ leads    Allscripts / Epic Records Reviewed: Yes     ** Please Note: This note has been constructed using a voice recognition system   **

## 2018-01-05 NOTE — ED NOTES
Spoke with KEITH Mcdaniel PA-C, made aware of pts complaint of nausea  Reports will order an additional dose of zofran 4mg IVP now        Mariel Fiore RN  01/04/18 1925

## 2018-01-05 NOTE — CONSULTS
Consultation - Neurology   Idalia Ha 59 y o  female MRN: [id number]  Unit/Bed#: E4 -01 Encounter: [id number]      Assessment/Plan   Assessment:   Recurrence syncope, primarily due to orthostatic hypotension; common may on occasion have a vasovagal component  She is also maintained on a significant amount of medications that can contribute to hypotension/orthostatic hypotension or which can blunt her responses to early symptoms and prolonged her recovery  She could potentially have an autonomic component as well  Her examination is not suggestive of other neurologic pathology  Plan:  1  Change Midodrine schedule to t i d  before meals  2   Nico's should be applied before arising  Since she will require assistance with application, she may need to wear them overnight  3  Although her Ativan dose has been decreased, she should continue to work with her private psychiatrist and PCP to reduce other psychotropic medications as well as meds used for neuropathic pain  4  Continue strategies as previously discussed:  Head of bed elevation, ankle pumps, slow position changes, avoidance of prolonged standing especially with hyperextension of knees adequate hydration  Assume a supine position at the very onset of presyncopal symptoms  5   Follow up with private neurologist as scheduled in February 2018      History of Present Illness     Physician Requesting Consult: Rafi Ignacio MD  Reason for Consult / Principal Problem:  Syncope  Hx and PE limited by:  None    HPI: dIalia Ha is a 59y o  year old female with a history of adrenal insufficiency, bipolar disorder, cervical radiculopathy, chronic pain including back pain, fibromyalgia, recurrent syncope due to orthostatic hypotension since 2014, and migraines who has been frequently evaluated by both [facility] in [facility] as well as [facility] for recurrent syncope    She has demonstrated orthostatic hypotension with reflex tachycardia during episodes, and  has been treated with Midodrine, elimination of Lasix, reduction in Ativan dosing, NICO hose, head of bed elevation, and other mechanical strategies to prevent orthostasis  Her last [facility] admission was 10/8 through 10/11/2017 for migraine  She was admitted to [facility] on 12/09, as well as visited the [address] [place] [address] Emergency Department on 12/16, 12/18, and 12/27/2017 for episodes of syncope  She has had extensive workup by Neurology at both facilities, as well as Cardiology  A recent Holter monitor showed sinus tachycardia during the dizzy spells  Complicating her evaluations are complaints of neck pain, back pain, hip pain, limb pain from areas of impact when she collapses superimposed on her pre-existing fibromyalgia  History was again reviewed with the patient at the bedside  She has a brief prodrome of blackening of her vision prior to the actual syncope  She reports any prodrome is so brief as to preclude her from reacting to it  She was admitted to [facility] in [facility] on 01/04/2018 after a fairly typical syncopal event that occurred at 8:30 a m, with the exception of her awareness of a few seconds of bilateral leg shaking when she awoke on the floor  She reports an episode of presyncope on 12/31/2017 characterized  as lightheadedness and leg shaking  She reports she has been striving to maintain adequate fluid intake  Her Midodrine schedule has been 5 mg t i d  after meals  In general she is applying her Nico hose after she has been up ambulating  She performs ankle pumps prior to upright stance  Inpatient consult to Neurology  Consult performed by: Julia Cee ordered by: Derek Beyer          Review of Systems   Gastrointestinal: Positive for nausea (When in pain)  Musculoskeletal: Positive for arthralgias (Worsened right hip pain after fall), back pain, myalgias, neck pain and neck stiffness     Neurological: Positive for tremors, syncope, light-headedness, numbness (Chronic bilateral lower extremity numbness, now worse on the right following the fall) and headaches  All other systems reviewed and are negative  Historical Information   Past Medical History:   Diagnosis Date    Adrenal insufficiency (Andrés's disease) ([facility] [address] )     Bipolar disorder     Cervical radiculopathy     Chronic back pain     DVT, lower extremity ([facility] [address] ) 1991    Fibromyalgia     History of TIAs     cannot remember details    Hypertension     Hypokalemia     Migraine     Psychiatric disorder     Anxiety, major depression, bipolar    Spinal stenosis     Syncope 2014    orthostatic hypotension     Past Surgical History:   Procedure Laterality Date    APPENDECTOMY      GASTRIC RESTRICTION SURGERY      Gastric Sleeve Nov 2015    HYSTERECTOMY      JOINT REPLACEMENT      Left Knee 2011 and Right Hip 2010     Social History   History   Alcohol Use No     History   Drug Use No     History   Smoking Status    Former Smoker    Packs/day: 0 20    Types: Cigarettes    Quit date: 2008   Smokeless Tobacco    Never Used     Family History:   Family History   Problem Relation Age of Onset    Breast cancer Mother     Migraines Mother     Arthritis Mother     Kidney disease Father     Heart disease Father     Diabetes Father     Arthritis Father     Brain cancer Brother     Seizures Brother        Review of previous medical records was  completed       Meds/Allergies   current meds:   Current Facility-Administered Medications   Medication Dose Route Frequency    acetaminophen (TYLENOL) tablet 650 mg  650 mg Oral Q6H PRN    ARIPiprazole (ABILIFY) tablet 15 mg  15 mg Oral Daily    enoxaparin (LOVENOX) subcutaneous injection 40 mg  40 mg Subcutaneous Daily    fluvoxaMINE (LUVOX) tablet 300 mg  300 mg Oral HS    gabapentin (NEURONTIN) capsule 600 mg  600 mg Oral TID    hydrocortisone (CORTEF) tablet 20 mg  20 mg Oral BID    lamoTRIgine (LaMICtal) tablet 200 mg  200 mg Oral Daily    lidocaine (LIDODERM) 5 % patch 1 patch  1 patch Transdermal Daily    LORazepam (ATIVAN) tablet 0 5 mg  0 5 mg Oral TID    methocarbamol (ROBAXIN) tablet 1,000 mg  1,000 mg Oral TID PRN    midodrine (PROAMATINE) tablet 5 mg  5 mg Oral TID    morphine injection 2 mg  2 mg Intravenous Q4H PRN    ondansetron (ZOFRAN) injection 4 mg  4 mg Intravenous Q6H PRN    sodium chloride 0 9 % infusion  125 mL/hr Intravenous Continuous    traMADol (ULTRAM) tablet 50 mg  50 mg Oral Q6H PRN    zonisamide (ZONEGRAN) capsule 50 mg  50 mg Oral Daily    and PTA meds:   Prior to Admission Medications   Prescriptions Last Dose Informant Patient Reported? Taking? ARIPiprazole (ABILIFY) 5 mg tablet   No Yes   Sig: Take 1 tablet by mouth daily   Patient taking differently: Take 15 mg by mouth daily     LORazepam (ATIVAN) 1 mg tablet   Yes Yes   Sig: Take 0 5 mg by mouth 3 (three) times a day   aspirin 325 mg tablet   Yes Yes   Sig: Take 325 mg by mouth daily  fluvoxaMINE (LUVOX) 100 mg tablet   Yes Yes   Sig: Take 300 mg by mouth daily at bedtime     gabapentin (NEURONTIN) 600 MG tablet   Yes Yes   Sig: Take 600 mg by mouth 3 (three) times a day  hydrocortisone (CORTEF) 10 mg tablet   No Yes   Sig: Take 1 tablet by mouth 3 (three) times a day   Patient taking differently: Take 20 mg by mouth 2 (two) times a day     lamoTRIgine (LaMICtal) 200 MG tablet   Yes Yes   Sig: Take 200 mg by mouth daily     methocarbamol (ROBAXIN) 500 mg tablet   No Yes   Sig: Take 2 tablets by mouth 3 (three) times a day as needed for muscle spasms   midodrine (PROAMATINE) 5 mg tablet   Yes Yes   Sig: Take 5 mg by mouth 3 (three) times a day   zonisamide (ZONEGRAN) 50 MG capsule   No Yes   Sig: Take 1 capsule by mouth daily      Facility-Administered Medications: None       Allergies   Allergen Reactions    Ketorolac Itching and Other (See Comments)     [toradol] Itching, hives    Mushroom Extract Complex     Risperidone      Other reaction(s): Unknown Reaction    Temazepam Hives       Objective   Vitals:Blood pressure 107/53, pulse (!) 52, temperature 97 9 °F (36 6 °C), temperature source Tympanic, resp  rate 18, height 5' 8" (1 727 m), weight 98 2 kg (216 lb 7 9 oz), SpO2 95 %  ,Body mass index is 32 92 kg/m²  Intake/Output Summary (Last 24 hours) at 01/05/18 1015  Last data filed at 01/05/18 0612   Gross per 24 hour   Intake             1000 ml   Output              220 ml   Net              780 ml       Physical Exam   Constitutional: She is oriented to person, place, and time  She appears well-developed and well-nourished  HENT:   Mouth/Throat: Oropharynx is clear and moist    Eyes: Conjunctivae and EOM are normal  Pupils are equal, round, and reactive to light  Scleral icterus is present  Neck: Normal range of motion  Neck supple  Carotid bruit is not present  Cardiovascular: Normal rate and regular rhythm  No murmur heard  Pulmonary/Chest: Effort normal and breath sounds normal    Abdominal: Soft  Bowel sounds are normal    Musculoskeletal: Normal range of motion  Neurological: She is oriented to person, place, and time  She has normal strength  She has a normal Finger-Nose-Finger Test    Reflex Scores:       Tricep reflexes are 2+ on the right side and 2+ on the left side  Bicep reflexes are 2+ on the right side and 2+ on the left side  Brachioradialis reflexes are 2+ on the right side and 2+ on the left side  Patellar reflexes are 2+ on the right side and 2+ on the left side  Achilles reflexes are 2+ on the right side and 2+ on the left side  Skin: Skin is warm and dry  Psychiatric: She has a normal mood and affect  Her speech is normal and behavior is normal  Judgment and thought content normal    Vitals reviewed  Neurologic Exam     Mental Status   Oriented to person, place, and time  Follows 2 step commands     Attention: normal  Concentration: normal    Speech: speech is normal   Level of consciousness: alert  Knowledge: good  Normal comprehension  Cranial Nerves     CN II   Visual fields full to confrontation  CN III, IV, VI   Pupils are equal, round, and reactive to light  Extraocular motions are normal    Conjugate gaze: present    CN V   Facial sensation intact  CN VII   Facial expression full, symmetric  CN VIII   CN VIII normal      CN XI   CN XI normal    Right sternocleidomastoid strength: normal  Left sternocleidomastoid strength: normal  Right trapezius strength: normal  Left trapezius strength: normal    CN XII   CN XII normal    Tongue deviation: none  Unable to visualize optic discs     Motor Exam   Muscle bulk: normal  Overall muscle tone: normal  Right arm pronator drift: absent  Left arm pronator drift: absent    Strength   Strength 5/5 throughout  Sensory Exam   Right leg light touch: Decreased throughout    Left leg light touch: normal  Right arm vibration: normal  Left arm vibration: normal  Right leg vibration: decreased from knee  Left leg vibration: normal  Temperature:  Decreased throughout right lower extremity, intact in left lower extremity in bilateral upper extremities     Gait, Coordination, and Reflexes     Gait  Gait: (Deferred)    Coordination   Finger to nose coordination: normal    Tremor   Resting tremor: absent  Intention tremor: absent  Action tremor: absent    Reflexes   Right brachioradialis: 2+  Left brachioradialis: 2+  Right biceps: 2+  Left biceps: 2+  Right triceps: 2+  Left triceps: 2+  Right patellar: 2+  Left patellar: 2+  Right achilles: 2+  Left achilles: 2+  Right : 2+  Left : 2+  Right plantar: normal  Left plantar: normal  Right ankle clonus: absent  Left ankle clonus: absent          Lab Results:   CBC:   Results from last 7 days  Lab Units 01/04/18  1018   WBC Thousand/uL 8 42   RBC Million/uL 3 91   HEMOGLOBIN g/dL 11 7   HEMATOCRIT % 35 9   MCV fL 92 PLATELETS Thousands/uL 271   , BMP/CMP:   Results from last 7 days  Lab Units 01/04/18  1018   SODIUM mmol/L 143   POTASSIUM mmol/L 3 5   CHLORIDE mmol/L 107   CO2 mmol/L 27   ANION GAP mmol/L 9   BUN mg/dL 12   CREATININE mg/dL 0 96   GLUCOSE RANDOM mg/dL 112   CALCIUM mg/dL 9 0   AST U/L 18   ALT U/L 18   ALK PHOS U/L 94   TOTAL PROTEIN g/dL 7 1   ALBUMIN g/dL 3 6   BILIRUBIN TOTAL mg/dL 0 28   EGFR ml/min/1 73sq m 65    Lab Results   Component Value Date    HGBA1C 5 4 10/09/2017     Trop 0 00    10/2017 FLP: , TG 62, HD 84,     LVH:   12/2017: Mag 2 3, A1c 5 9, VGCC Ab 0 0, LTG 1 6, UDS +BZO, cortisol 4 9    Imaging Studies: I have personally reviewed pertinent reports  and I have personally reviewed pertinent films in PACS   CT head: No intracranial mass, mass effect or midline shift  No CT signs of acute infarction  There is no parenchymal hemorrhage  VENTRICLES AND EXTRA-AXIAL SPACES:  Normal for patient's age  CT cervical spine: Normal alignment of the cervical spine  No subluxation  There is loss of the normal cervical lordosis  VERTEBRAL BODIES:  No fracture  Mild multilevel cervical degenerative changes are noted without critical central canal stenosis  EKG, Pathology, and Other Studies: I have personally reviewed pertinent reports  EKG Normal sinus rhythm Voltage criteria for left ventricular hypertrophy Abnormal ECG When compared with ECG of 21-DEC-2017 16:53, No significant change was found    CXR: Heart shadow appears unremarkable  Atherosclerotic vascular calcifications are noted  The lungs are clear  Stable lingular scarring  No pneumothorax or pleural effusion  Age-appropriate degenerative changes are noted in the spine  Osseous structures appear otherwise within normal limits  XR lumbar spine:Degenerative changes  No evidence of fracture or other acute bony abnormality in the lumbar spine  XR thoracic spine: Degenerative changes    No evidence of fracture or other acute bony abnormality in the thoracic spine  Carotid US: < 50% ICA stenosis b/l    CT chest (PE study): Negative for acute or chronic pulmonary thromboembolism  No acute findings in the chest     08/2017 Echo:  LEFT Daniel Grad function was normal  Ejection fraction was estimated to be 65 %  There were no regional wall motion abnormalities  Doppler parameters were consistent with abnormal left ventricular relaxation (grade 1 diastolic dysfunction)  MITRAL VALVE: There was mild annular calcification  AORTIC VALVE: There was very mild stenosis  TRICUSPID VALVE: There was mild regurgitation  AORTA: The root exhibited normal size and mild fibrocalcific change  PULMONARY ARTERIES: Systolic pressure was at the upper limits of normal  Estimated peak pressure was in the range of 30 mmHg to 35 mmHg  12/2017 ([facility]) CT CSP:  No acute fracture of the cervical spine  Degenerative changes are most prominent at the following levels: C6-C7: Moderate loss of the intervertebral disc space height  RIGHT uncovertebral joint hypertrophy  Mild RIGHT neuroforaminal stenosis  Kyle Yan     VTE Prophylaxis: Sequential compression device (Venodyne)  and Enoxaparin (Lovenox)

## 2018-01-05 NOTE — ASSESSMENT & PLAN NOTE
Hx of migraines  Pt reports mild HA s/p fall  Will encourage APAP and minimize narcotics to minimize affect of rebound headache

## 2018-01-05 NOTE — ASSESSMENT & PLAN NOTE
Acute Midline and right low back in soft tissue nonradiating s/p fall as well as in mid back  Pt reports difficulty raising right leg 2* pain in back/hip  Xray hip unremarkable, prosthesis is in place w/o fracture  Pt does have ABNORMAL Torres's test (concerning for poor effort with SLR by grasping contralateral calcaneus)  Will check Xray lumbar spine/thoracic spine to r/o acute fx  Analgesics as above

## 2018-01-05 NOTE — SOCIAL WORK
CM met with pt at bedside to discuss discharge needs  Pt reports living in a house with her   ADL's are completed independently  Pt owns a walker but only uses when she feels unsteady  Pt is active with [facility]  PCP identified as Dr Rexene Apgar  Pharmacy identified as [facility] on [address]  Pt does drive but hasn't been since the syncope   has been providing transport  Pt denies POA  Will send referral for BILLIE to [facility]

## 2018-01-05 NOTE — ASSESSMENT & PLAN NOTE
Patient with a history of syncope and collapse, previously suspected to be secondary to polypharmacy and orthostasis seen in examined for same in 08/2017  Today CT head negative for any ischemia, midline shift, hemorrhage or mass, EKG today NSR w/o QTC prolongation or evidence of HB  TTE w/LVEF 55-60% and no significant valvular disease in August 2017, MRI MRA in May of 2017 negative for any significant posterior circulation or internal carotid disease prior sinus bradycardia noted in the 30s on last admission was evaluated by cardiology and felt pt would benefit from OP holter monitor but did not need loop recorder placement  Pt reports BP at home was SBP of 90 after episode of syncope  Will admit to observation status, monitor orthostatics, check carotid duplex to assess extracranial carotids given recurrence, and will kindly reconsult neurology for any recommendations

## 2018-01-05 NOTE — CASE MANAGEMENT
Initial Clinical Review    Admission: Date/Time/Statement: OBS 1/4 @ 1427    Orders Placed This Encounter   Procedures    Place in Observation (expected length of stay for this patient is less than two midnights)     Standing Status:   Standing     Number of Occurrences:   1     Order Specific Question:   Admitting Physician     Answer:   Massiel Gutierres [[id number]]     Order Specific Question:   Level of Care     Answer:   Med Surg [16]       ED: Date/Time/Mode of Arrival:   ED Arrival Information     Expected Arrival Acuity Means of Arrival Escorted By Service Admission Type    - 1/4/2018 10:02 Urgent Wheelchair Self General Medicine Urgent    Arrival Complaint    [address] PAIN          Chief Complaint:   Chief Complaint   Patient presents with    Syncope     Syncopal episode this morning at 0830; woke up on floor with R hip pain and neck pain  History of Illness: 38-year-old female presents for evaluation of syncopal episode  Patient states that she felt like she was about to pass out and syncopal episode at home  She denies other prodromal symptoms  She states she is unsure how long she was down for when she woke up she had a headache and chest pain  Patient complains of sharp diffuse headache which is constant, nonradiating, without modifying factors, moderate intensity  Patient also complains of diffuse neck pain since the fall  Patient complains of sharp anterior chest pain which is constant, nonradiating without modifying factors  Patient also complains of right hip pain since the fall    Patient has persistent weakness, other traumatic injuries, dizziness, vertigo, cough, URI symptoms       ED Vital Signs:   ED Triage Vitals   Temperature Pulse Respirations Blood Pressure SpO2   01/04/18 1008 01/04/18 1008 01/04/18 1008 01/04/18 1008 01/04/18 1008   99 9 °F (37 7 °C) 88 18 122/64 95 %      Temp Source Heart Rate Source Patient Position - Orthostatic VS BP Location FiO2 (%)   01/04/18 1008 01/04/18 1205 01/04/18 1205 01/04/18 1205 --   Temporal Monitor Lying Right arm       Pain Score       01/04/18 1008       Worst Possible Pain        Wt Readings from Last 1 Encounters:   01/04/18 98 2 kg (216 lb 7 9 oz)         Abnormal Labs/Diagnostic Test Results:  CBC,  Cp,  Trop - nl    CXR: No active pulmonary disease  EKG:  NSR    R Hip Xray: Satisfactory appearance of the right hip prosthesis   No pelvic or hip fracture seen   Degenerative arthritis in the lower lumbar spine    CTA Chest: 1   Negative for acute or chronic pulmonary thromboembolism  2   No acute findings in the chest     CT Head: No acute intracranial abnormality  CT C Spine: No cervical spine fracture or traumatic malalignment   Loss of the normal cervical lordosis which is likely secondary to patient positioning, cervical collar, or muscle spasm   Ligamentous injury cannot be entirely excluded  ED Treatment:   Medication Administration from 01/04/2018 1002 to 01/04/2018 2039       Date/Time Order Dose Route Action Action by Comments     01/04/2018 1123 ondansetron (ZOFRAN) injection 4 mg 4 mg Intravenous Given Bernice Sanchez RN      01/04/2018 1125 morphine (PF) 4 mg/mL injection 4 mg 4 mg Intravenous Given Bernice Sanchez RN      01/04/2018 1149 iohexol (OMNIPAQUE) 350 MG/ML injection (MULTI-DOSE) 85 mL 85 mL Intravenous Given P[facility]      01/04/2018 1439 aspirin chewable tablet 324 mg 324 mg Oral Given Bernice Sanchez RN      01/04/2018 1926 ondansetron (ZOFRAN) injection 4 mg 4 mg Intravenous Given Juancarlos Pineda RN           Past Medical/Surgical History:    Active Ambulatory Problems     Diagnosis Date Noted    [facility] depression [facility] [address] [facility]     Adrenal insufficiency ([place]'s disease) ([facility] [address] [facility]     Fibromyalgia     Status post gastrectomy 04/08/2016    Spinal stenosis of lumbar region 05/05/2015    Osteoarthritis of lumbosacral spine without myelopathy 09/08/2015    HLD (hyperlipidemia) 05/28/2017    Syncope 07/29/2017    Orthostatic hypotension     History of TIAs     Migraine      Resolved Ambulatory Problems     Diagnosis Date Noted    Hypokalemia     Vertigo 07/21/2016    Hypotension (arterial) 07/22/2016    Migraine 05/28/2017    Hemiparesthesia 05/28/2017     Past Medical History:   Diagnosis Date    Adrenal [facility] ([place]'s disease) ([facility] [address] [facility]     Bipolar disorder     Cervical radiculopathy     Chronic back pain     DVT, lower extremity ([facility] [address] ) 1991    Fibromyalgia     History of TIAs     Hypertension     Hypokalemia     Migraine     Psychiatric disorder     Spinal stenosis     Syncope 2014       Admitting Diagnosis: Neck pain [M54 2]  Syncope [R55]  Right hip pain [M25 551]  Acute headache [R51]  Chest pain, rule out acute myocardial infarction [R07 9]    Age/Sex: 59 y o  female    Assessment/Plan: Principal Problem:    Syncope  Active Problems:    Chest wall pain    Migraine    Neck pain    Low back pain    Bipolar depression (HCC)    Adrenal insufficiency ([place]'s disease) (HCC)    Fibromyalgia    HLD (hyperlipidemia)    Orthostatic hypotension            * Syncope   Assessment & Plan     Patient with a history of syncope and collapse, previously suspected to be secondary to polypharmacy and orthostasis seen in examined for same in 08/2017  Today CT head negative for any ischemia, midline shift, hemorrhage or mass, EKG today NSR w/o QTC prolongation or evidence of HB  TTE w/LVEF 55-60% and no significant valvular disease in August 2017, MRI MRA in May of 2017 negative for any significant posterior circulation or internal carotid disease prior sinus bradycardia noted in the 30s on last admission was evaluated by cardiology and felt pt would benefit from OP holter monitor but did not need loop recorder placement  Pt reports BP at home was SBP of 90 after episode of syncope  Will admit to observation status, monitor orthostatics, check carotid duplex to assess extracranial carotids given recurrence, and will kindly reconsult neurology for any recommendations       Chest wall pain   Assessment & Plan     Left sided w/radiation to shoulder status post fall  PRAVEENA score 0  CTA PE negative for PE, dissection, stable does 5mm nodule from 2015 suggesting benign process  EKG NSR w/o ST changes T wave inversions in 2+ leads  Troponin 0  rec'd asa 325mg once in ED  Will check lipid panel/hgb a1c will monitor on telemetry for #1, trend troponins for completeness  If negative no additional cardiac workup recommended at this time       Low back pain   Assessment & Plan     Acute Midline and right low back in soft tissue nonradiating s/p fall as well as in mid back  Pt reports difficulty raising right leg 2* pain in back/hip  Xray hip unremarkable, prosthesis is in place w/o fracture  Pt does have ABNORMAL Torres's test (concerning for poor effort with SLR by grasping contralateral calcaneus)  Will check Xray lumbar spine/thoracic spine to r/o acute fx  Analgesics as above          Neck pain   Assessment & Plan     S/p fall  Indiscriminate pain in soft tissues/midline spine  CT cervical spine negative for any acute fracture, dislocation or critical spinal stenosis but does demonstrate multilevel degenerative changes noted  rec'd morphine 4mg in ED will continue OP robaxin, will start tramadol prn, lidocaine patch scheduled APAP, and ice/heat continue neurontin  Pt limited by pain medications due to hx of bariatric surgery (no NSAIDs)       Migraine   Assessment & Plan     Hx of migraines  Pt reports mild HA s/p fall  Will encourage APAP and minimize narcotics to minimize affect of rebound headache       Orthostatic hypotension   Assessment & Plan     Continue midodrine check orthostatics q shift       HLD (hyperlipidemia)   Assessment & Plan     Diet controlled  LDL 3 mo prior was 105  Will defer statin at this time       Fibromyalgia   Assessment & Plan     Continue robaxin       Adrenal insufficiency ([place]'s disease) ([facility] [address] )   Assessment & Plan     No s/sx of infection  Continue cortef       Bipolar depression ([facility] [address] )   Assessment & Plan     Continue luvox, ativan, abilify         VTE Prophylaxis: Enoxaparin (Lovenox)  / sequential compression device   Code Status: Full Code  POLST: There is no POLST form on file for this patient (pre-hospital)  Anticipated Length of Stay:  Patient will be admitted on an Observation basis with an anticipated length of stay of  Less than 2 midnights  Justification for Hospital Stay: syncope       Admission Orders:  [facility] Neuro  SCD's  Carotid US    Scheduled Meds:   ARIPiprazole 15 mg Oral Daily   enoxaparin 40 mg Subcutaneous Daily   fluvoxaMINE 300 mg Oral HS   gabapentin 600 mg Oral TID   hydrocortisone 20 mg Oral BID   lamoTRIgine 200 mg Oral Daily   lidocaine 1 patch Transdermal Daily   LORazepam 0 5 mg Oral TID   midodrine 5 mg Oral TID   zonisamide 50 mg Oral Daily     Continuous Infusions:   sodium chloride 125 mL/hr Last Rate: 125 mL/hr (01/05/18 0612)     PRN Meds:   acetaminophen    Methocarbamol x 1  And x 1  1/5    morphine injection IV x 1 and x 1 1/5    ondansetron    traMADol x 1 and x 1  1/5    1/5:  97 9 - 52 - 18   107/53;   RA sat 95%    Thank you,  [facility] in the [facility] by Jesse Spain for 2017  Network Utilization Review Department  Phone: [phone]; Fax [phone]  ATTENTION: The Network Utilization Review Department is now centralized for our 7 Facilities  Make a note that we have a new phone and fax numbers for our Department  Please call with any questions or concerns to [phone] and carefully follow the prompts so that you are directed to the right person  All voicemails are confidential  Fax any determinations, approvals, denials, and requests for initial or continue stay review clinical to [phone]   Due to HIGH CALL volume, it would be easier if you could please send faxed requests to expedite your requests and in part, help us provide discharge notifications faster

## 2018-01-05 NOTE — CONSULTS
Consult - Cardiology   Parish Monteiro 59 y o  female MRN: [id number]  Unit/Bed#: E4 -01 Encounter: [id number]        Reason For Consult:  Recurrent syncope     Assessment and Plan:      1  Recurrent syncope:   - history consistent with autonomic dysfunction, and orthostatic hypotension    - current orthostatic blood pressures confirmed approximately 30 mm gradient from lying to standing and low normal resting blood pressure  - will increase midodrine to 10 mg t i d  and assess response continuing nonpharmacologic interventions as she has          History Of Present Illness: This patient has a history of adrenal insufficiency on chronic steroid replacement, obesity with previous gastric sleeve surgery (2016), peripheral neuropathy, bipolar disorder, and DJD with previous knee replacement  The patient has been having recurrent syncope since August of 2017  She states this has been persistent and perhaps progressive in frequency occurring seemingly once weekly  She has had numerous ED visits and hospitalizations  The most recent of these was In November of 2017  at [facility] then complaining of syncope x2  With the second event she fell to the floor hitting her head with resultant cervical strain  She was seen in consultation by Cardiology with their consult reiterate Ng her history of multiple syncopal events  She has had continued cardiology care by Dr Mary Maria  Echocardiogram performed in August 2017 at our facility reported normal LVEF with no regional wall motion abnormality, very mild AS, mild TR, and estimated peak pulmonary artery pressures 30-35 mm of mercury  A Dobutamine stress echo performed at [facility] that same month was normal by ECG and echo criteria,  She has had previous event monitoring with a syncopal episode occurring during a monitored period reported no associated arrhythmia  Other complaints of dizziness were at times correlated to sinus tachycardia  During her November hospitalization she was observed to have some low normal blood pressures and what was reported as nocturnal hypotension  She was started on Midrin with additional advisement for nonpharmacologic therapy (compression stockings, salt liberalization, and hydration)  Because she had previously had extensive workup it was felt she did not need other cardiac testing including additional event monitoring  She states that since this time she has been compliant with her medications, she drinks more than a case of water per week, has utilized knee high compression stockings, and typically pumps her feet for several minutes before standing  She reports home blood pressure tract as being low normal - 110s  Despite these things the patient has had recurrent syncope  She was to the emergency department at [facility] once in December though not admitted  Again, her symptoms seem to occur approximately once weekly with the patient noting she had an episode on 1/1/2018 and again yesterday, 1/4/2018  She does not always go to the hospital for these events noting that it is usually her  that prompted her to seek evaluation  Her syncope has always been from a standing position and within what the patient estimates as 1-5 minutes of rising  These episodes occur too abruptly for the patient to avert them by quickly sitting             Past Medical History:        Past Medical History:   Diagnosis Date    Adrenal insufficiency ([place]'s disease) ([facility] [address] [facility]     Bipolar disorder     Cervical radiculopathy     Chronic back pain     DVT, lower extremity ([facility] [address] ) 1991    Fibromyalgia     History of TIAs     cannot remember details    Hypertension     Hypokalemia     Migraine     Psychiatric disorder     Anxiety, major depression, bipolar    Spinal stenosis     Syncope 2014    orthostatic hypotension    Past Surgical History:   Procedure Laterality Date    APPENDECTOMY      GASTRIC RESTRICTION SURGERY      Gastric Sleeve Nov 2015    HYSTERECTOMY      JOINT REPLACEMENT      Left Knee 2011 and Right Hip 2010        Allergy:        Allergies   Allergen Reactions    Ketorolac Itching and Other (See Comments)     [toradol] Itching, hives    Mushroom Extract Complex     Risperidone      Other reaction(s): Unknown Reaction    Temazepam Hives       Medications:       Prior to Admission medications    Medication Sig Start Date End Date Taking? Authorizing Provider   ARIPiprazole (ABILIFY) 5 mg tablet Take 1 tablet by mouth daily  Patient taking differently: Take 15 mg by mouth daily   8/4/17  Yes Tila Tinoco DO   aspirin 325 mg tablet Take 325 mg by mouth daily  Yes Historical Provider, MD   fluvoxaMINE (LUVOX) 100 mg tablet Take 300 mg by mouth daily at bedtime     Yes Historical Provider, MD   gabapentin (NEURONTIN) 600 MG tablet Take 600 mg by mouth 3 (three) times a day  Yes Historical Provider, MD   hydrocortisone (CORTEF) 10 mg tablet Take 1 tablet by mouth 3 (three) times a day  Patient taking differently: Take 20 mg by mouth 2 (two) times a day   8/4/17  Yes Tila Tinoco DO   lamoTRIgine (LaMICtal) 200 MG tablet Take 200 mg by mouth daily     Yes Historical Provider, MD   LORazepam (ATIVAN) 1 mg tablet Take 0 5 mg by mouth 3 (three) times a day   Yes Historical Provider, MD   methocarbamol (ROBAXIN) 500 mg tablet Take 2 tablets by mouth 3 (three) times a day as needed for muscle spasms 8/4/17  Yes Tila Tinoco DO   midodrine (PROAMATINE) 5 mg tablet Take 5 mg by mouth 3 (three) times a day   Yes Historical Provider, MD   zonisamide (ZONEGRAN) 50 MG capsule Take 1 capsule by mouth daily 8/4/17  Yes Marcelle Vu DO       Family History:     Family History   Problem Relation Age of Onset    Breast cancer Mother     Migraines Mother     Arthritis Mother     Kidney disease Father     Heart disease Father     Diabetes Father     Arthritis Father     Brain cancer Brother  Seizures Brother         Social History:       Social History     Social History    Marital status: /Civil Union     Spouse name: N/A    Number of children: N/A    Years of education: N/A     Social History Main Topics    Smoking status: Former Smoker     Packs/day: 0 20     Types: Cigarettes     Quit date: 2008    Smokeless tobacco: Never Used    Alcohol use No    Drug use: No    Sexual activity: Yes     Partners: Male     Other Topics Concern    None     Social History Narrative    None       ROS:  Symptoms per HPI    Exam:  General:  Alert, cooperative, comfortable-appearing  Head: Normocephalic, atraumatic  Eyes:  EOMI  Pupils - equal, round, reactive to accomodation  No icterus  Normal Conjunctiva  Oropharynx:  Moist no lesion  Neck:  No bruit or JVD  Heart:  Regular without pathologic murmur  Lungs:  Clear with no rales/rhonchi/wheeze  Abdomen:   Soft and nontender if normal bowel sounds  No organomegaly or mass  Lower Limbs:  no pitting edema  Pulses[de-identified]  RLE - DP: present 2+                 LLE - DP: present 2+  Musculoskeletal: Independent movement of limbs observed, Formal ROM and strength eval not performed  Neurologic:    Oriented to: person , place, situation  Cranial Nerves: grossly intact - vision, smell, taste, and hearing  were not tested  Motor function:{[facility] EXAM Neuro muscle motor strength:81004   Sensation: Was not tested    DATA:                     Telemetry: Normal sinus rhythm, HR ~80                 Weights: Wt Readings from Last 3 Encounters:   01/04/18 98 2 kg (216 lb 7 9 oz)   12/21/17 93 kg (205 lb)   10/08/17 96 3 kg (212 lb 4 9 oz)   , Body mass index is 32 92 kg/m²           Lab Studies:               Results from last 7 days  Lab Units 01/04/18  1018   WBC Thousand/uL 8 42   HEMOGLOBIN g/dL 11 7   HEMATOCRIT % 35 9   PLATELETS Thousands/uL 271   ,   Results from last 7 days  Lab Units 01/04/18  1018   SODIUM mmol/L 143   POTASSIUM mmol/L 3 5 CHLORIDE mmol/L 107   CO2 mmol/L 27   BUN mg/dL 12   CREATININE mg/dL 0 96   CALCIUM mg/dL 9 0   TOTAL PROTEIN g/dL 7 1   BILIRUBIN TOTAL mg/dL 0 28   ALK PHOS U/L 94   ALT U/L 18   AST U/L 18   GLUCOSE RANDOM mg/dL 112

## 2018-01-05 NOTE — ASSESSMENT & PLAN NOTE
S/p fall  Indiscriminate pain in soft tissues/midline spine  CT cervical spine negative for any acute fracture, dislocation or critical spinal stenosis but does demonstrate multilevel degenerative changes noted  rec'd morphine 4mg in ED will continue OP robaxin, will start tramadol prn, lidocaine patch scheduled APAP, and ice/heat continue neurontin  Pt limited by pain medications due to hx of bariatric surgery (no NSAIDs)

## 2018-01-05 NOTE — ED NOTES
Pt vomited moderate amount of partially digested food, states "I get nauseous when I'm in pain"  Informed pt that admitting provider would be down to speak with pt as soon as possible  Pt provided ginger ale as requested        Merle Harp RN  01/04/18 9021

## 2018-01-05 NOTE — PLAN OF CARE
CARDIOVASCULAR - ADULT     Maintains optimal cardiac output and hemodynamic stability Progressing     Absence of cardiac dysrhythmias or at baseline rhythm Progressing        DISCHARGE PLANNING     Discharge to home or other facility with appropriate resources Progressing        GASTROINTESTINAL - ADULT     Minimal or absence of nausea and/or vomiting Progressing     Maintains adequate nutritional intake Progressing        INFECTION - ADULT     Absence or prevention of progression during hospitalization Progressing     Absence of fever/infection during neutropenic period Progressing        Knowledge Deficit     Patient/family/caregiver demonstrates understanding of disease process, treatment plan, medications, and discharge instructions Progressing        METABOLIC, FLUID AND ELECTROLYTES - ADULT     Fluid balance maintained Progressing        NEUROSENSORY - ADULT     Absence of seizures Progressing     Remains free of injury related to seizures activity Progressing     Achieves maximal functionality and self care Progressing        PAIN - ADULT     Verbalizes/displays adequate comfort level or baseline comfort level Progressing        Potential for Falls     Patient will remain free of falls Progressing        SAFETY ADULT     Maintain or return to baseline ADL function Progressing     Maintain or return mobility status to optimal level Progressing

## 2018-01-05 NOTE — PROGRESS NOTES
[facility] Internal [facility] Progress Note  Patient: Idalia Ha 59 y o  female   MRN: [id number]  PCP: Ekaterina Montes De Oca MD  Unit/Bed#: E4 -45 Encounter: [id number]  Date Of Visit: 01/05/18    Assessment:    Principal Problem:    Syncope  Active Problems:    Bipolar depression ([address] )    Adrenal insufficiency (Andrés's disease) (HCC)    Fibromyalgia    HLD (hyperlipidemia)    Orthostatic hypotension    Migraine    Chest wall pain    Neck pain    Low back pain      Plan: This is a 64-year-old female with history of orthostatic hypotension, fibromyalgia, migraines who presents to the ED for syncope  Patient states that she had episodes of having black spots and some nausea and chest discomfort after which she has a loss of consciousness  1 )  Syncope  -patient has history of syncope and collapse and has had history of orthostatic hypertension in the past  -CT head negative for any acute intracranial changes  -patient had echo in 08/2017 which revealed ejection fraction which fracture 60% and no significant valvular disease  -MRI in May 2017 was negative for any acute changes  -neurology consult will be appreciated  -cardiology consult will be appreciated    2 )  Chest pain  -CT chest was negative for any pulmonary embolism, dissection  -no acute EKG changes  -cardiology consult will be appreciated    3 )  Back pain  -imaging negative for any acute fractures  -continue with pain control  4 )  Migraine headaches    5 )  Orthostatic hypotension  -continue with midodrine, monitor blood pressure    6 )  Hyperlipidemia  -diet controlled    7 )  Bipolar depression  -continue with home medications      VTE Pharmacologic Prophylaxis:   Pharmacologic:  Lovenox    Mechanical VTE Prophylaxis in Place:  SCD    Patient Centered Rounds: I have performed bedside rounds with nursing staff today      Discussions with Specialists or Other Care Team Provider: yes    Education and Discussions with Family / Patient: no    Time Spent for Care: 20 minutes  More than 50% of total time spent on counseling and coordination of care as described above  Current Length of Stay: 0 day(s)    Current Patient Status: Observation   Certification Statement: The patient will continue to require additional inpatient hospital stay due to Further monitoring    Discharge Plan / Estimated Discharge Date: 24-48 hours    Code Status: Level 1 - Full Code      Subjective:   Patient seen examined at bedside, denies any complaints  Objective:     Vitals:   Temp (24hrs), Av 8 °F (37 1 °C), Min:97 9 °F (36 6 °C), Max:99 4 °F (37 4 °C)    HR:  [52-76] 76  Resp:  [18-20] 18  BP: ()/(52-60) 83/53  SpO2:  [93 %-95 %] 93 %  Body mass index is 32 92 kg/m²  Input and Output Summary (last 24 hours): Intake/Output Summary (Last 24 hours) at 18 1459  Last data filed at 18 1300   Gross per 24 hour   Intake             1340 ml   Output              520 ml   Net              820 ml       Physical Exam:    Constitutional: Patient is oriented to person, place and time, no acute distress  HEENT:  Normocephalic, atraumatic, EOMI, PERRLA, no scleral icterus, no pallor, moist oral mucosa  Neck:  Supple, no masses, no thyromegaly, no bruits Normal range of motion  Lymph nodes:  No lymphadenopathy  Cardiovascular: Normal S1S2, RRR, No murmurs/rubs/gallops appreciated  No murmur heard  Pulmonary: Clear to auscultation bilaterally, No rhonchi/rales/wheezing appreciated  Abdominal: Soft, Bowel sounds present, Non-tender, Non-distended, No rebound/guarding, no hepatomegaly   Musculoskeletal: No tenderness/abnormality   Extremities:  No cyanosis, clubbing or edema  Peripheral pulses palpable and equal bilaterally  Neurological: Cranial nerves II-XII grossly intact, sensation intact, otherwise no focal neurological symptoms  Skin: Skin is warm and dry, no rashes      Additional Data:     Labs:      Results from last 7 days  Lab Units 01/04/18  1018   WBC Thousand/uL 8 42   HEMOGLOBIN g/dL 11 7   HEMATOCRIT % 35 9   PLATELETS Thousands/uL 271   NEUTROS PCT % 59   LYMPHS PCT % 28   MONOS PCT % 10   EOS PCT % 2       Results from last 7 days  Lab Units 01/04/18  1018   SODIUM mmol/L 143   POTASSIUM mmol/L 3 5   CHLORIDE mmol/L 107   CO2 mmol/L 27   BUN mg/dL 12   CREATININE mg/dL 0 96   CALCIUM mg/dL 9 0   TOTAL PROTEIN g/dL 7 1   BILIRUBIN TOTAL mg/dL 0 28   ALK PHOS U/L 94   ALT U/L 18   AST U/L 18   GLUCOSE RANDOM mg/dL 112            I Have Reviewed All Lab Data Listed Above        Imaging:    Imaging Reports Reviewed Today Include: no new images      Recent Cultures (last 7 days):           Last 24 Hours Medication List:     ARIPiprazole 15 mg Oral Daily   enoxaparin 40 mg Subcutaneous Daily   fluvoxaMINE 300 mg Oral HS   gabapentin 600 mg Oral TID   hydrocortisone 20 mg Oral BID   lamoTRIgine 200 mg Oral Daily   lidocaine 1 patch Transdermal Daily   LORazepam 0 5 mg Oral TID   midodrine 5 mg Oral TID   zonisamide 50 mg Oral Daily        Today, Patient Was Seen By: Madelaine Conroy MD

## 2018-01-05 NOTE — PLAN OF CARE

## 2018-01-05 NOTE — ED NOTES
Spoke with Megan Lewis who states that they are waiting for the room to be cleaned     Marycarmen Sexton RN  01/04/18 7419

## 2018-01-05 NOTE — ASSESSMENT & PLAN NOTE
Left sided w/radiation to shoulder status post fall  PRAVEENA score 0  CTA PE negative for PE, dissection, stable does 5mm nodule from 2015 suggesting benign process  EKG NSR w/o ST changes T wave inversions in 2+ leads  Troponin 0  rec'd asa 325mg once in ED  Will check lipid panel/hgb a1c will monitor on telemetry for #1, trend troponins for completeness  If negative no additional cardiac workup recommended at this time

## 2018-01-06 VITALS
WEIGHT: 216.49 LBS | TEMPERATURE: 98.5 F | OXYGEN SATURATION: 96 % | DIASTOLIC BLOOD PRESSURE: 76 MMHG | HEART RATE: 57 BPM | HEIGHT: 68 IN | BODY MASS INDEX: 32.81 KG/M2 | RESPIRATION RATE: 18 BRPM | SYSTOLIC BLOOD PRESSURE: 133 MMHG

## 2018-01-06 PROBLEM — R55 SYNCOPE: Status: RESOLVED | Noted: 2017-07-29 | Resolved: 2018-01-06

## 2018-01-06 PROBLEM — R07.89 CHEST WALL PAIN: Status: RESOLVED | Noted: 2018-01-04 | Resolved: 2018-01-06

## 2018-01-06 RX ORDER — LIDOCAINE 50 MG/G
1 PATCH TOPICAL DAILY
Qty: 30 PATCH | Refills: 0 | Status: SHIPPED | OUTPATIENT
Start: 2018-01-07 | End: 2018-06-22

## 2018-01-06 RX ORDER — MIDODRINE HYDROCHLORIDE 5 MG/1
10 TABLET ORAL 3 TIMES DAILY
Qty: 90 TABLET | Refills: 0 | Status: SHIPPED | OUTPATIENT
Start: 2018-01-06 | End: 2019-04-05 | Stop reason: ALTCHOICE

## 2018-01-06 RX ADMIN — ARIPIPRAZOLE 15 MG: 10 TABLET ORAL at 08:20

## 2018-01-06 RX ADMIN — LAMOTRIGINE 200 MG: 100 TABLET ORAL at 08:18

## 2018-01-06 RX ADMIN — SODIUM CHLORIDE 125 ML/HR: 0.9 INJECTION, SOLUTION INTRAVENOUS at 08:24

## 2018-01-06 RX ADMIN — LORAZEPAM 0.5 MG: 0.5 TABLET ORAL at 08:18

## 2018-01-06 RX ADMIN — ZONISAMIDE 50 MG: 25 CAPSULE ORAL at 08:19

## 2018-01-06 RX ADMIN — MORPHINE SULFATE 2 MG: 2 INJECTION, SOLUTION INTRAMUSCULAR; INTRAVENOUS at 03:49

## 2018-01-06 RX ADMIN — MORPHINE SULFATE 2 MG: 2 INJECTION, SOLUTION INTRAMUSCULAR; INTRAVENOUS at 08:16

## 2018-01-06 RX ADMIN — TRAMADOL HYDROCHLORIDE 50 MG: 50 TABLET, FILM COATED ORAL at 06:16

## 2018-01-06 RX ADMIN — ONDANSETRON 4 MG: 2 INJECTION INTRAMUSCULAR; INTRAVENOUS at 10:16

## 2018-01-06 RX ADMIN — SODIUM CHLORIDE 125 ML/HR: 0.9 INJECTION, SOLUTION INTRAVENOUS at 01:35

## 2018-01-06 RX ADMIN — METHOCARBAMOL 1000 MG: 500 TABLET ORAL at 01:42

## 2018-01-06 RX ADMIN — MORPHINE SULFATE 2 MG: 2 INJECTION, SOLUTION INTRAMUSCULAR; INTRAVENOUS at 13:01

## 2018-01-06 RX ADMIN — GABAPENTIN 600 MG: 300 CAPSULE ORAL at 08:17

## 2018-01-06 RX ADMIN — ENOXAPARIN SODIUM 40 MG: 40 INJECTION SUBCUTANEOUS at 08:18

## 2018-01-06 RX ADMIN — LIDOCAINE 1 PATCH: 50 PATCH CUTANEOUS at 08:18

## 2018-01-06 RX ADMIN — HYDROCORTISONE 20 MG: 10 TABLET ORAL at 08:20

## 2018-01-06 RX ADMIN — MIDODRINE HYDROCHLORIDE 10 MG: 5 TABLET ORAL at 08:17

## 2018-01-06 NOTE — CASE MANAGEMENT
Initial Clinical Review    Admission: Date/Time/Statement: OBS 1/4 @ 1427    Orders Placed This Encounter   Procedures    Place in Observation (expected length of stay for this patient is less than two midnights)     Standing Status:   Standing     Number of Occurrences:   1     Order Specific Question:   Admitting Physician     Answer:   Aliza Carrera [[id number]]     Order Specific Question:   Level of Care     Answer:   Med Surg [16]       ED: Date/Time/Mode of Arrival:   ED Arrival Information     Expected Arrival Acuity Means of Arrival Escorted By Service Admission Type    - 1/4/2018 10:02 Urgent Wheelchair Self General Medicine Urgent    Arrival Complaint    [address] PAIN          Chief Complaint:   Chief Complaint   Patient presents with    Syncope     Syncopal episode this morning at 0830; woke up on floor with R hip pain and neck pain  History of Illness: 78-year-old female presents for evaluation of syncopal episode  Patient states that she felt like she was about to pass out and syncopal episode at home  She denies other prodromal symptoms  She states she is unsure how long she was down for when she woke up she had a headache and chest pain  Patient complains of sharp diffuse headache which is constant, nonradiating, without modifying factors, moderate intensity  Patient also complains of diffuse neck pain since the fall  Patient complains of sharp anterior chest pain which is constant, nonradiating without modifying factors  Patient also complains of right hip pain since the fall    Patient has persistent weakness, other traumatic injuries, dizziness, vertigo, cough, URI symptoms       ED Vital Signs:   ED Triage Vitals   Temperature Pulse Respirations Blood Pressure SpO2   01/04/18 1008 01/04/18 1008 01/04/18 1008 01/04/18 1008 01/04/18 1008   99 9 °F (37 7 °C) 88 18 122/64 95 %      Temp Source Heart Rate Source Patient Position - Orthostatic VS BP Location FiO2 (%)   01/04/18 1008 01/04/18 1205 01/04/18 1205 01/04/18 1205 --   Temporal Monitor Lying Right arm       Pain Score       01/04/18 1008       Worst Possible Pain          Wt Readings from Last 1 Encounters:   01/04/18 98 2 kg (216 lb 7 9 oz)         Abnormal Labs/Diagnostic Test Results:  CBC,  Cp,  Trop - nl    CXR: No active pulmonary disease  EKG:  NSR    R Hip Xray: Satisfactory appearance of the right hip prosthesis   No pelvic or hip fracture seen   Degenerative arthritis in the lower lumbar spine    CTA Chest: 1   Negative for acute or chronic pulmonary thromboembolism  2   No acute findings in the chest     CT Head: No acute intracranial abnormality  CT C Spine: No cervical spine fracture or traumatic malalignment   Loss of the normal cervical lordosis which is likely secondary to patient positioning, cervical collar, or muscle spasm   Ligamentous injury cannot be entirely excluded  1/5 VAS CAROTID STUDY:Impression  RIGHT:  There is <50% stenosis noted in the internal carotid artery  Plaque is  homogenous  Vertebral artery flow is antegrade  There is no significant subclavian artery  disease  LEFT:  There is <50% stenosis noted in the internal carotid artery  Plaque is  homogenous  Vertebral artery flow is antegrade  There is no significant subclavian artery  disease  In comparison to the study of 9/1/2011, there is no significant interval change  in the disease process bilaterally           ED Treatment:   Medication Administration from 01/04/2018 1002 to 01/04/2018 2039       Date/Time Order Dose Route Action Action by Comments     01/04/2018 1123 ondansetron (ZOFRAN) injection 4 mg 4 mg Intravenous Given Maribel Garvin RN      01/04/2018 1125 morphine (PF) 4 mg/mL injection 4 mg 4 mg Intravenous Given Maribel Garvin RN      01/04/2018 1149 iohexol (OMNIPAQUE) 350 MG/ML injection (MULTI-DOSE) 85 mL 85 mL Intravenous Given Qasim Green      01/04/2018 1439 aspirin chewable tablet 324 mg 324 mg Oral Given Crow Lawler RN      01/04/2018 1926 ondansetron (ZOFRAN) injection 4 mg 4 mg Intravenous Given Darryle Schlatter, RN           Past Medical/Surgical History:    Active Ambulatory Problems     Diagnosis Date Noted    [facility]ipolar depression ([facility] )     Ad[facility]'s disease) [facility]     Fibromyalgia     Status post gastrectomy 04/08/2016    Spinal stenosis of lumbar region 05/05/2015    Osteoarthritis of lumbosacral spine without myelopathy 09/08/2015    HLD (hyperlipidemia) 05/28/2017    Syncope 07/29/2017    Orthostatic hypotension     History of TIAs     Migraine      Resolved Ambulatory Problems     Diagnosis Date Noted    Hypokalemia     Vertigo 07/21/2016    Hypotension (arterial) 07/22/2016    Migraine 05/28/2017    Hemiparesthesia 05/28/2017     Past Medical History:   Diagnosis Date    Adrenal insufficiency ([place]'s disease) ([facility]     Bipolar disorder     Cervical radiculopathy     Chronic back pain     DVT, lower extremity ([facility] ) 1991    Fibromyalgia     History of TIAs     Hypertension     Hypokalemia     Migraine     Psychiatric disorder     Spinal stenosis     Syncope 2014       Admitting Diagnosis: Neck pain [M54 2]  Syncope [R55]  Right hip pain [M25 551]  Acute headache [R51]  Chest pain, rule out acute myocardial infarction [R07 9]    Age/Sex: 59 y o  female    Assessment/Plan: Principal Problem:    Syncope  Active Problems:    Chest wall pain    Migraine    Neck pain    Low back pain    Bipolar depression (HCC)    Adrenal insufficiency (P[place] P[place]'s disease) (HCC)    Fibromyalgia    HLD (hyperlipidemia)    Orthostatic hypotension            * Syncope   Assessment & Plan     Patient with a history of syncope and collapse, previously suspected to be secondary to polypharmacy and orthostasis seen in examined for same in 08/2017  Today CT head negative for any ischemia, midline shift, hemorrhage or mass, EKG today NSR w/o QTC prolongation or evidence of HB  TTE w/LVEF 55-60% and no significant valvular disease in August 2017, MRI MRA in May of 2017 negative for any significant posterior circulation or internal carotid disease prior sinus bradycardia noted in the 30s on last admission was evaluated by cardiology and felt pt would benefit from OP holter monitor but did not need loop recorder placement  Pt reports BP at home was SBP of 90 after episode of syncope  Will admit to observation status, monitor orthostatics, check carotid duplex to assess extracranial carotids given recurrence, and will kindly reconsult neurology for any recommendations       Chest wall pain   Assessment & Plan     Left sided w/radiation to shoulder status post fall  PRAVEENA score 0  CTA PE negative for PE, dissection, stable does 5mm nodule from 2015 suggesting benign process  EKG NSR w/o ST changes T wave inversions in 2+ leads  Troponin 0  rec'd asa 325mg once in ED  Will check lipid panel/hgb a1c will monitor on telemetry for #1, trend troponins for completeness  If negative no additional cardiac workup recommended at this time       Low back pain   Assessment & Plan     Acute Midline and right low back in soft tissue nonradiating s/p fall as well as in mid back  Pt reports difficulty raising right leg 2* pain in back/hip  Xray hip unremarkable, prosthesis is in place w/o fracture  Pt does have ABNORMAL Torres's test (concerning for poor effort with SLR by grasping contralateral calcaneus)  Will check Xray lumbar spine/thoracic spine to r/o acute fx  Analgesics as above          Neck pain   Assessment & Plan     S/p fall  Indiscriminate pain in soft tissues/midline spine  CT cervical spine negative for any acute fracture, dislocation or critical spinal stenosis but does demonstrate multilevel degenerative changes noted  rec'd morphine 4mg in ED will continue OP robaxin, will start tramadol prn, lidocaine patch scheduled APAP, and ice/heat continue neurontin  Pt limited by pain medications due to hx of bariatric surgery (no NSAIDs)       Migraine   Assessment & Plan     Hx of migraines  Pt reports mild HA s/p fall  Will encourage APAP and minimize narcotics to minimize affect of rebound headache       Orthostatic hypotension   Assessment & Plan     Continue midodrine check orthostatics q shift       HLD (hyperlipidemia)   Assessment & Plan     Diet controlled  LDL 3 mo prior was 105  Will defer statin at this time       Fibromyalgia   Assessment & Plan     Continue robaxin       Adrenal insufficiency (Sully's disease) (HCC)   Assessment & Plan     No s/sx of infection  Continue cortef       Bipolar depression (HCC)   Assessment & Plan     Continue luvox, ativan, abilify         VTE Prophylaxis: Enoxaparin (Lovenox)  / sequential compression device   Code Status: Full Code  POLST: There is no POLST form on file for this patient (pre-hospital)  Anticipated Length of Stay:  Patient will be admitted on an Observation basis with an anticipated length of stay of  Less than 2 midnights  Justification for Hospital Stay: syncope     Attestation signed by Noy Simeon DO at 1/5/2018 5:43 PM   Attending Addendum: Agree with PA/CRNP/resident note with the following additions:  -57-year-old female with multiple episodes of syncope, prompting hospital admission and subsequent workup   -all likely due to orthostatic hypertension +/-vagal vagal component, at least in part secondary to longstanding psychiatric medications  -patient appears at baseline currently as per personal recollection as well as chart   -recommendations regarding midodrine, Nico stockings, hydration and Ativan as described note   -no additional neurologic recommendations at this time    -patient follows with [facility]  -please call questions      Expand All Collapse All    []Hide copied text     Consultation - Neurology   Ender Baca 59 y o  female MRN: [id number]  Unit/Bed#: E4 -01 Encounter: [id number]           Assessment/Plan      Assessment:   Recurrence syncope, primarily due to orthostatic hypotension; common may on occasion have a vasovagal component  She is also maintained on a significant amount of medications that can contribute to hypotension/orthostatic hypotension or which can blunt her responses to early symptoms and prolonged her recovery  She could potentially have an autonomic component as well  Her examination is not suggestive of other neurologic pathology  Plan:  1  Change Midodrine schedule to t i d  before meals  2   Nico's should be applied before arising  Since she will require assistance with application, she may need to wear them overnight  3  Although her Ativan dose has been decreased, she should continue to work with her private psychiatrist and PCP to reduce other psychotropic medications as well as meds used for neuropathic pain  4  Continue strategies as previously discussed:  Head of bed elevation, ankle pumps, slow position changes, avoidance of prolonged standing especially with hyperextension of knees adequate hydration  Assume a supine position at the very onset of presyncopal symptoms    5   Follow up with private neurologist as scheduled in February 2018                Admission Orders:  [facility]  SCD's  Carotid   1/5:  [facility]  REG DIET    Scheduled Meds:   ARIPiprazole 15 mg Oral Daily   enoxaparin 40 mg Subcutaneous Daily   fluvoxaMINE 300 mg Oral HS   gabapentin 600 mg Oral TID   hydrocortisone 20 mg Oral BID   lamoTRIgine 200 mg Oral Daily   lidocaine 1 patch Transdermal Daily   LORazepam 0 5 mg Oral TID   midodrine 5 mg Oral TID   zonisamide 50 mg Oral Daily     Continuous Infusions:   sodium chloride 125 mL/hr Last Rate: 125 mL/hr (01/05/18 0612)     PRN Meds:   acetaminophen    Methocarbamol x 1  And x 1  1/5 AND 1/6 X 1    morphine injection IV x 1 and x 1 1/5 AND 1/6 X 1    ondansetron    traMADol x 1 and x 1  1/5 AND 1/6 X 1    1/5:  97 9 - 52 - 18   107/53;   RA sat 95%      [facility] Internal Medicine Progress Note  Patient: Zhanna Pimentel 59 y o  female   MRN: [id number]  PCP: Naty Mike MD  Unit/Bed#: E4 -48 Encounter: [id number]  Date Of Visit: 01/05/18     Assessment:     Principal Problem:    Syncope  Active Problems:    Bipolar depression ([address] )    Adrenal insufficiency (Jessamine's disease) (HCC)    Fibromyalgia    HLD (hyperlipidemia)    Orthostatic hypotension    Migraine    Chest wall pain    Neck pain    Low back pain        Plan:     This is a 23-year-old female with history of orthostatic hypotension, fibromyalgia, migraines who presents to the ED for syncope  Patient states that she had episodes of having black spots and some nausea and chest discomfort after which she has a loss of consciousness      1 )  Syncope  -patient has history of syncope and collapse and has had history of orthostatic hypertension in the past  -CT head negative for any acute intracranial changes  -patient had echo in 08/2017 which revealed ejection fraction which fracture 60% and no significant valvular disease  -MRI in May 2017 was negative for any acute changes    -neurology consult will be appreciated  -cardiology consult will be appreciated     2 )  Chest pain  -CT chest was negative for any pulmonary embolism, dissection  -no acute EKG changes  -cardiology consult will be appreciated     3 )  Back pain  -imaging negative for any acute fractures  -continue with pain control  4 )  Migraine headaches     5 )  Orthostatic hypotension  -continue with midodrine, monitor blood pressure     6 )  Hyperlipidemia  -diet controlled     7 )  Bipolar depression  -continue with home medications        VTE Pharmacologic Prophylaxis:   Pharmacologic:  Lovenox     Mechanical VTE Prophylaxis in Place:  SCD     Patient Centered Rounds: I have performed bedside rounds with nursing staff today      Discussions with Specialists or Other Care Team Provider: yes     Education and Discussions with Family / Patient: no     Time Spent for Care: 20 minutes  More than 50% of total time spent on counseling and coordination of care as described above      Current Length of Stay: 0 day(s)     Current Patient Status: Observation   Certification Statement: The patient will continue to require additional inpatient hospital stay due to Further monitoring     Discharge Plan / Estimated Discharge Date: 24-48 hours     Code Status: Level 1 - Full Code      Consult - Cardiology   Ender Baca 59 y o  female MRN: [id number]  Unit/Bed#: E4 -01 Encounter: [id number]           Reason For Consult:  Recurrent syncope     Assessment and Plan:      1  Recurrent syncope:              - history consistent with autonomic dysfunction, and orthostatic hypotension               - current orthostatic blood pressures confirmed approximately 30 mm gradient from lying to standing and low normal resting blood pressure  - will increase midodrine to 10 mg t i d  and assess response continuing nonpharmacologic interventions as she has                                    Thank you,  [facility] in the [facility] by Jesse Spain for 2017  Network Utilization Review Department  Phone: [phone]; Fax [phone]  ATTENTION: The Network Utilization Review Department is now centralized for our 7 Facilities  Make a note that we have a new phone and fax numbers for our Department  Please call with any questions or concerns to [phone] and carefully follow the prompts so that you are directed to the right person  All voicemails are confidential  Fax any determinations, approvals, denials, and requests for initial or continue stay review clinical to [phone]   Due to HIGH CALL volume, it would be easier if you could please send faxed requests to expedite your requests and in part, help us provide discharge notifications faster

## 2018-01-06 NOTE — PROGRESS NOTES
[address] Internal Medicine Progress Note  Patient: Trey Laughlin 59 y o  female   MRN: [id number]  PCP: Brandee Hinkle MD  Unit/Bed#: E4 -00 Encounter: [id number]  Date Of Visit: 01/06/18    Assessment:    Principal Problem:    Syncope  Active Problems:    Bipolar depression ([address] )    Adrenal insufficiency (Andrés's disease) (HCC)    Fibromyalgia    HLD (hyperlipidemia)    Orthostatic hypotension    Migraine    Chest wall pain    Neck pain    Low back pain      Plan:    1 )  Syncope  -patient has history of consistent with autonomic dysfunction and orthostatic hypotension  -midodrine increased to 10 mg t i d   -neurology consult appreciated, no further neurologic recommendations at this time  -patient has had prior MRIs in May 2017 which were negative  -CT head this admission is negative for any acute intracranial changes    2 )  Orthostatic hypotension  -midodrine increased by Cardiology    3 )  Chest pain  -CT negative for any pulmonary embolism, dissection  -no acute ST-T changes    4 )  Back pain  -continue with pain control    5 )  Migraine headaches    6 )  Hyperlipidemia  -controlled with diet    7 )  Bipolar depression  -resume home medications    VTE Pharmacologic Prophylaxis:   Pharmacologic:  Lovenox    Mechanical VTE Prophylaxis in Place:  SCD    Patient Centered Rounds: I have performed bedside rounds with nursing staff today  Discussions with Specialists or Other Care Team Provider: yes    Education and Discussions with Family / Patient: yes    Time Spent for Care: 20 minutes  More than 50% of total time spent on counseling and coordination of care as described above      Current Length of Stay: 0 day(s)    Current Patient Status: Observation   Certification Statement: The patient will continue to require additional inpatient hospital stay due to Further monitoring    Discharge Plan / Estimated Discharge Date: 24-48 hours    Code Status: Level 1 - Full Code      Subjective:   Patient seen and examined at bedside, denies any complaints  Objective:     Vitals:   Temp (24hrs), Av 2 °F (36 8 °C), Min:97 8 °F (36 6 °C), Max:98 5 °F (36 9 °C)    HR:  [49-70] 57  Resp:  [18] 18  BP: ()/(58-76) 133/76  SpO2:  [94 %-96 %] 96 %  Body mass index is 32 92 kg/m²  Input and Output Summary (last 24 hours): Intake/Output Summary (Last 24 hours) at 18 1348  Last data filed at 18 0824   Gross per 24 hour   Intake          3380 83 ml   Output                0 ml   Net          3380 83 ml       Physical Exam:    Constitutional: Patient is oriented to person, place and time, no acute distress  HEENT:  Normocephalic, atraumatic, EOMI, PERRLA, no scleral icterus, no pallor, moist oral mucosa  Neck:  Supple, no masses, no thyromegaly, no bruits Normal range of motion  Lymph nodes:  No lymphadenopathy  Cardiovascular: Normal S1S2, RRR, No murmurs/rubs/gallops appreciated  No murmur heard  Pulmonary: Clear to auscultation bilaterally, No rhonchi/rales/wheezing appreciated  Abdominal: Soft, Bowel sounds present, Non-tender, Non-distended, No rebound/guarding, no hepatomegaly   Musculoskeletal: No tenderness/abnormality   Extremities:  No cyanosis, clubbing or edema  Peripheral pulses palpable and equal bilaterally  Neurological: Cranial nerves II-XII grossly intact, sensation intact, otherwise no focal neurological symptoms  Skin: Skin is warm and dry, no rashes      Additional Data:     Labs:      Results from last 7 days  Lab Units 18  1018   WBC Thousand/uL 8 42   HEMOGLOBIN g/dL 11 7   HEMATOCRIT % 35 9   PLATELETS Thousands/uL 271   NEUTROS PCT % 59   LYMPHS PCT % 28   MONOS PCT % 10   EOS PCT % 2       Results from last 7 days  Lab Units 18  1018   SODIUM mmol/L 143   POTASSIUM mmol/L 3 5   CHLORIDE mmol/L 107   CO2 mmol/L 27   BUN mg/dL 12   CREATININE mg/dL 0 96   CALCIUM mg/dL 9 0   TOTAL PROTEIN g/dL 7 1   BILIRUBIN TOTAL mg/dL 0 28   ALK PHOS U/L 94   ALT U/L 18   AST U/L 18   GLUCOSE RANDOM mg/dL 112            I Have Reviewed All Lab Data Listed Above        Imaging:    Imaging Reports Reviewed Today Include: no new images      Recent Cultures (last 7 days):           Last 24 Hours Medication List:     ARIPiprazole 15 mg Oral Daily   enoxaparin 40 mg Subcutaneous Daily   fluvoxaMINE 300 mg Oral HS   gabapentin 600 mg Oral TID   hydrocortisone 20 mg Oral BID   lamoTRIgine 200 mg Oral Daily   lidocaine 1 patch Transdermal Daily   LORazepam 0 5 mg Oral TID   midodrine 10 mg Oral TID   zonisamide 50 mg Oral Daily        Today, Patient Was Seen By: Rafi Ignacio MD

## 2018-01-06 NOTE — PROGRESS NOTES
Progress Note - Cardiology   Dakota Valenzuela 59 y o  female MRN: [id number]  Unit/Bed#: E4 -01 Encounter: [id number]      Assessment:     1  Orthostatic hypotension, with recurrent syncope    Discussion/Recommendations:    · Slightly improved gradients from about 15 mm of mercury to 10 mm of mercury after midodrine increased  Advised she elevate legs and feet when she sits, and increased sodium intake  She feels her fluid intake is already optimized  She states Abilify was increased sometime in August 2017 before syncopal episodes started to occur  She will discuss further with her psychiatrist if Abilify and other psychiatric medications can be adjusted to improve orthostasis  Follow-up with primary [facility]ologist at [facility]  Will follow as needed from here on  Subjective:  Patient seen and examined, feels well, no symptoms while here        Physical Exam:  GEN:  NAD  HEENT:  MMM, NCAT, pink conjunctiva, EOMI, nonicteric sclera  CV:  NO JVD/HJR, RR, NO M/R/G, +S1/S2, NO PARASTERNAL HEAVE/THRILL, NO LE EDEMA, NO HEPATIC SYSTOLIC PULSATION, WARM EXTREMITIES  RESP:  CTAB/L  ABD:  SOFT, NT, NO GROSS ORGANOMEGALY        Vitals:   /76   Pulse 57   Temp 98 5 °F (36 9 °C) (Temporal)   Resp 18   Ht 5' 8" (1 727 m)   Wt 98 2 kg (216 lb 7 9 oz)   SpO2 96%   BMI 32 92 kg/m²   Vitals:    01/04/18 2100   Weight: 98 2 kg (216 lb 7 9 oz)       Intake/Output Summary (Last 24 hours) at 01/06/18 1407  Last data filed at 01/06/18 0824   Gross per 24 hour   Intake          3380 83 ml   Output                0 ml   Net          3380 83 ml         TELEMETRY:  Unremarkable  Lab Results:    Results from last 7 days  Lab Units 01/04/18  1018   WBC Thousand/uL 8 42   HEMOGLOBIN g/dL 11 7   HEMATOCRIT % 35 9   PLATELETS Thousands/uL 271       Results from last 7 days  Lab Units 01/04/18  1018   SODIUM mmol/L 143   POTASSIUM mmol/L 3 5   CHLORIDE mmol/L 107   CO2 mmol/L 27   BUN mg/dL 12   CREATININE mg/dL 0 96 CALCIUM mg/dL 9 0   TOTAL PROTEIN g/dL 7 1   BILIRUBIN TOTAL mg/dL 0 28   ALK PHOS U/L 94   ALT U/L 18   AST U/L 18   GLUCOSE RANDOM mg/dL 112       Results from last 7 days  Lab Units 01/04/18  1018   SODIUM mmol/L 143   POTASSIUM mmol/L 3 5   CHLORIDE mmol/L 107   CO2 mmol/L 27   BUN mg/dL 12   CREATININE mg/dL 0 96   GLUCOSE RANDOM mg/dL 112   CALCIUM mg/dL 9 0             Medications:    Current Facility-Administered Medications:     acetaminophen (TYLENOL) tablet 650 mg, 650 mg, Oral, Q6H PRN, Yareli De Leon PA-C    ARIPiprazole (ABILIFY) tablet 15 mg, 15 mg, Oral, Daily, Yareli De Leon PA-C, 15 mg at 01/06/18 0820    enoxaparin (LOVENOX) subcutaneous injection 40 mg, 40 mg, Subcutaneous, Daily, BRIANNA MayesC, 40 mg at 01/06/18 0818    fluvoxaMINE (LUVOX) tablet 300 mg, 300 mg, Oral, HS, BRIANNA MayesC, 300 mg at 01/05/18 2134    gabapentin (NEURONTIN) capsule 600 mg, 600 mg, Oral, TID, Yareli De Leon PA-C, 600 mg at 01/06/18 0817    hydrocortisone (CORTEF) tablet 20 mg, 20 mg, Oral, BID, AVINASH Mayes-C, 20 mg at 01/06/18 0820    lamoTRIgine (LaMICtal) tablet 200 mg, 200 mg, Oral, Daily, Yareli De Leon PA-C, 200 mg at 01/06/18 0818    lidocaine (LIDODERM) 5 % patch 1 patch, 1 patch, Transdermal, Daily, Yareli De Leon PA-C, 1 patch at 01/06/18 0818    LORazepam (ATIVAN) tablet 0 5 mg, 0 5 mg, Oral, TID, Yareli De Leon PA-C, 0 5 mg at 01/06/18 0818    methocarbamol (ROBAXIN) tablet 1,000 mg, 1,000 mg, Oral, TID PRN, Yareli De Leon PA-C, 1,000 mg at 01/06/18 0142    midodrine (PROAMATINE) tablet 10 mg, 10 mg, Oral, TID, Kevon Soni PA-C, 10 mg at 01/06/18 0817    morphine injection 2 mg, 2 mg, Intravenous, Q4H PRN, Yareli De Leon PA-C, 2 mg at 01/06/18 1301    ondansetron (ZOFRAN) injection 4 mg, 4 mg, Intravenous, Q6H PRN, Yareli De Leon PA-C, 4 mg at 01/06/18 1016    sodium chloride 0 9 % infusion, 125 mL/hr, Intravenous, Continuous, Yareli Murguia PA-C, Last Rate: 125 mL/hr at 01/06/18 0824, 125 mL/hr at 01/06/18 0824    traMADol (ULTRAM) tablet 50 mg, 50 mg, Oral, Q6H PRN, Yareli De Leon PA-C, 50 mg at 01/06/18 0616    zonisamide (ZONEGRAN) capsule 50 mg, 50 mg, Oral, Daily, Yareli De Leon PA-C, 50 mg at 01/06/18 1517    Portions of the record may have been created with voice recognition software  Occasional wrong word or "sound a like" substitutions may have occurred due to the inherent limitations of voice recognition software  Read the chart carefully and recognize, using context, where substitutions have occurred

## 2018-01-06 NOTE — DISCHARGE SUMMARY
Discharge Summary - [address] [facility]    Patient Information: Dakota Valenzuela 59 y o  female MRN: [id number]  Unit/Bed#: E4 -01 Encounter: [id number]    Discharging Physician / Practitioner: Keaton Robertson MD  PCP: Stephanie Gonzales MD  Admission Date: 1/4/2018  Discharge Date: 01/06/18    Reason for Admission: syncope    Discharge Diagnoses:     Principal Problem (Resolved):    Syncope  Active Problems:    Bipolar depression ([facility] )    Adrenal insufficiency (Vilas's disease) ([facility] )    Fibromyalgia    HLD (hyperlipidemia)    Orthostatic hypotension    Migraine    Neck pain    Low back pain  Resolved Problems:    Chest wall pain      Consultations During Hospital Stay:  · Cardiology  · Nephrology    Procedures Performed:     · None    Significant Findings / Test Results:     · None    Incidental Findings:   · none     Test Results Pending at Discharge (will require follow up): · None     Outpatient Tests Requested:  · None    Complications:  None    Hospital Course:     Dakota Valenzuela is a 59 y o  female patient who originally presented to the hospital on 1/4/2018 due to episode of syncope  Patient has history of orthostatic hypertension, fibromyalgia, migraine headaches  This was unwitnessed  Patient has prior TTE and CT scan in August and 17 which have been normal   Patient has also had MRA and MRI  Patient has a history of autonomic dysfunction and orthostatic hypotension  Neurology was consulted who stated this is likely secondary to orthostatic hypotension and no further Neurology recommendations were to be made  Cardiology was consulted who recommended to increase midodrine to 10 mg t i d   Patient is cleared for discharge today      Condition at Discharge: good     Discharge Day Visit / Exam:     * Please refer to separate progress note for these details *    Discussion with Family: no      Discharge instructions/Information to patient and family:   See after visit summary for information provided to patient and family  Provisions for Follow-Up Care:  See after visit summary for information related to follow-up care and any pertinent home health orders  Disposition:     Home    For Discharges to [facility] SNF:   · Not Applicable to this Patient - Not Applicable to this Patient    Planned Readmission: no    Discharge Statement:  I spent 30 minutes discharging the patient  This time was spent on the day of discharge  I had direct contact with the patient on the day of discharge  Greater than 50% of the total time was spent examining patient, answering all patient questions, arranging and discussing plan of care with patient as well as directly providing post-discharge instructions  Additional time then spent on discharge activities  Discharge Medications:  See after visit summary for reconciled discharge medications provided to patient and family  ** Please Note: This note has been constructed using a voice recognition system   **

## 2018-01-06 NOTE — PLAN OF CARE
CARDIOVASCULAR - ADULT     Maintains optimal cardiac output and hemodynamic stability Progressing     Absence of cardiac dysrhythmias or at baseline rhythm Progressing        DISCHARGE PLANNING     Discharge to home or other facility with appropriate resources Progressing        DISCHARGE PLANNING - CARE MANAGEMENT     Discharge to post-acute care or home with appropriate resources Progressing        GASTROINTESTINAL - ADULT     Minimal or absence of nausea and/or vomiting Progressing     Maintains adequate nutritional intake Progressing        INFECTION - ADULT     Absence or prevention of progression during hospitalization Progressing     Absence of fever/infection during neutropenic period Progressing        Knowledge Deficit     Patient/family/caregiver demonstrates understanding of disease process, treatment plan, medications, and discharge instructions Progressing        METABOLIC, FLUID AND ELECTROLYTES - ADULT     Fluid balance maintained Progressing        NEUROSENSORY - ADULT     Absence of seizures Progressing     Remains free of injury related to seizures activity Progressing     Achieves maximal functionality and self care Progressing        PAIN - ADULT     Verbalizes/displays adequate comfort level or baseline comfort level Progressing        Potential for Falls     Patient will remain free of falls Progressing        SAFETY ADULT     Maintain or return to baseline ADL function Progressing     Maintain or return mobility status to optimal level Progressing

## 2018-01-12 NOTE — MISCELLANEOUS
Provider Comments  Provider Comments:   Pt was a no-show for today's 140p apt   L/m to call us and r/s      Signatures   Electronically signed by : Spike Benites, ; Sep  7 2017  4:25PM EST                       (Administrative)

## 2018-01-23 ENCOUNTER — HOSPITAL ENCOUNTER (EMERGENCY)
Facility: HOSPITAL | Age: 58
Discharge: HOME | End: 2018-01-24
Attending: EMERGENCY MEDICINE | Admitting: EMERGENCY MEDICINE
Payer: COMMERCIAL

## 2018-01-23 ENCOUNTER — APPOINTMENT (EMERGENCY)
Dept: RADIOLOGY | Facility: HOSPITAL | Age: 58
End: 2018-01-23
Payer: COMMERCIAL

## 2018-01-23 ENCOUNTER — APPOINTMENT (EMERGENCY)
Dept: CT IMAGING | Facility: HOSPITAL | Age: 58
End: 2018-01-23
Payer: COMMERCIAL

## 2018-01-23 DIAGNOSIS — R07.89 NON-CARDIAC CHEST PAIN: Primary | ICD-10-CM

## 2018-01-23 LAB
ALBUMIN SERPL BCP-MCNC: 3.4 G/DL (ref 3.5–5)
ALP SERPL-CCNC: 100 U/L (ref 46–116)
ALT SERPL W P-5'-P-CCNC: 15 U/L (ref 12–78)
ANION GAP SERPL CALCULATED.3IONS-SCNC: 9 MMOL/L (ref 4–13)
AST SERPL W P-5'-P-CCNC: 14 U/L (ref 5–45)
BASOPHILS # BLD AUTO: 0.04 THOUSANDS/ΜL (ref 0–0.1)
BASOPHILS NFR BLD AUTO: 1 % (ref 0–1)
BILIRUB SERPL-MCNC: 0.16 MG/DL (ref 0.2–1)
BUN SERPL-MCNC: 15 MG/DL (ref 5–25)
CALCIUM SERPL-MCNC: 8.9 MG/DL (ref 8.3–10.1)
CHLORIDE SERPL-SCNC: 111 MMOL/L (ref 100–108)
CO2 SERPL-SCNC: 27 MMOL/L (ref 21–32)
CREAT SERPL-MCNC: 1.08 MG/DL (ref 0.6–1.3)
DEPRECATED D DIMER PPP: 977 NG/ML (FEU) (ref 0–424)
EOSINOPHIL # BLD AUTO: 0.16 THOUSAND/ΜL (ref 0–0.61)
EOSINOPHIL NFR BLD AUTO: 2 % (ref 0–6)
ERYTHROCYTE [DISTWIDTH] IN BLOOD BY AUTOMATED COUNT: 14 % (ref 11.6–15.1)
GFR SERPL CREATININE-BSD FRML MDRD: 56 ML/MIN/1.73SQ M
GLUCOSE SERPL-MCNC: 98 MG/DL (ref 65–140)
HCT VFR BLD AUTO: 35.9 % (ref 34.8–46.1)
HGB BLD-MCNC: 11.5 G/DL (ref 11.5–15.4)
LYMPHOCYTES # BLD AUTO: 2.77 THOUSANDS/ΜL (ref 0.6–4.47)
LYMPHOCYTES NFR BLD AUTO: 32 % (ref 14–44)
MCH RBC QN AUTO: 29.2 PG (ref 26.8–34.3)
MCHC RBC AUTO-ENTMCNC: 32 G/DL (ref 31.4–37.4)
MCV RBC AUTO: 91 FL (ref 82–98)
MONOCYTES # BLD AUTO: 0.75 THOUSAND/ΜL (ref 0.17–1.22)
MONOCYTES NFR BLD AUTO: 9 % (ref 4–12)
NEUTROPHILS # BLD AUTO: 4.98 THOUSANDS/ΜL (ref 1.85–7.62)
NEUTS SEG NFR BLD AUTO: 56 % (ref 43–75)
NRBC BLD AUTO-RTO: 0 /100 WBCS
PLATELET # BLD AUTO: 288 THOUSANDS/UL (ref 149–390)
PMV BLD AUTO: 10.5 FL (ref 8.9–12.7)
POTASSIUM SERPL-SCNC: 3.7 MMOL/L (ref 3.5–5.3)
PROT SERPL-MCNC: 7 G/DL (ref 6.4–8.2)
RBC # BLD AUTO: 3.94 MILLION/UL (ref 3.81–5.12)
SODIUM SERPL-SCNC: 147 MMOL/L (ref 136–145)
TROPONIN I SERPL-MCNC: <0.02 NG/ML
TROPONIN I SERPL-MCNC: <0.02 NG/ML
WBC # BLD AUTO: 8.7 THOUSAND/UL (ref 4.31–10.16)

## 2018-01-23 PROCEDURE — 84484 ASSAY OF TROPONIN QUANT: CPT | Performed by: EMERGENCY MEDICINE

## 2018-01-23 PROCEDURE — 93005 ELECTROCARDIOGRAM TRACING: CPT

## 2018-01-23 PROCEDURE — 71275 CT ANGIOGRAPHY CHEST: CPT

## 2018-01-23 PROCEDURE — 71046 X-RAY EXAM CHEST 2 VIEWS: CPT

## 2018-01-23 PROCEDURE — 85025 COMPLETE CBC W/AUTO DIFF WBC: CPT | Performed by: EMERGENCY MEDICINE

## 2018-01-23 PROCEDURE — 85379 FIBRIN DEGRADATION QUANT: CPT | Performed by: EMERGENCY MEDICINE

## 2018-01-23 PROCEDURE — 80053 COMPREHEN METABOLIC PANEL: CPT | Performed by: EMERGENCY MEDICINE

## 2018-01-23 PROCEDURE — 36415 COLL VENOUS BLD VENIPUNCTURE: CPT

## 2018-01-23 RX ORDER — ONDANSETRON 2 MG/ML
4 INJECTION INTRAMUSCULAR; INTRAVENOUS ONCE
Status: DISCONTINUED | OUTPATIENT
Start: 2018-01-23 | End: 2018-01-23

## 2018-01-23 RX ORDER — MAGNESIUM HYDROXIDE/ALUMINUM HYDROXICE/SIMETHICONE 120; 1200; 1200 MG/30ML; MG/30ML; MG/30ML
30 SUSPENSION ORAL ONCE
Status: COMPLETED | OUTPATIENT
Start: 2018-01-23 | End: 2018-01-23

## 2018-01-23 RX ORDER — ONDANSETRON 4 MG/1
4 TABLET, ORALLY DISINTEGRATING ORAL ONCE
Status: COMPLETED | OUTPATIENT
Start: 2018-01-23 | End: 2018-01-23

## 2018-01-23 RX ADMIN — IOHEXOL 66 ML: 350 INJECTION, SOLUTION INTRAVENOUS at 23:49

## 2018-01-23 RX ADMIN — ONDANSETRON 4 MG: 4 TABLET, ORALLY DISINTEGRATING ORAL at 22:44

## 2018-01-23 RX ADMIN — ALUMINUM HYDROXIDE, MAGNESIUM HYDROXIDE, AND SIMETHICONE 30 ML: 200; 200; 20 SUSPENSION ORAL at 22:15

## 2018-01-24 VITALS
OXYGEN SATURATION: 97 % | DIASTOLIC BLOOD PRESSURE: 67 MMHG | SYSTOLIC BLOOD PRESSURE: 136 MMHG | HEART RATE: 66 BPM | RESPIRATION RATE: 18 BRPM | TEMPERATURE: 98 F

## 2018-01-24 LAB
ATRIAL RATE: 79 BPM
P AXIS: 40 DEGREES
PR INTERVAL: 150 MS
QRS AXIS: -6 DEGREES
QRSD INTERVAL: 84 MS
QT INTERVAL: 380 MS
QTC INTERVAL: 435 MS
T WAVE AXIS: 14 DEGREES
VENTRICULAR RATE: 79 BPM

## 2018-01-24 PROCEDURE — 99285 EMERGENCY DEPT VISIT HI MDM: CPT

## 2018-01-24 RX ORDER — ACETAMINOPHEN 325 MG/1
975 TABLET ORAL ONCE
Status: COMPLETED | OUTPATIENT
Start: 2018-01-24 | End: 2018-01-24

## 2018-01-24 RX ORDER — ACETAMINOPHEN 325 MG/1
TABLET ORAL
Status: COMPLETED
Start: 2018-01-24 | End: 2018-01-24

## 2018-01-24 RX ADMIN — ACETAMINOPHEN 975 MG: 325 TABLET ORAL at 00:37

## 2018-01-24 RX ADMIN — ACETAMINOPHEN 975 MG: 325 TABLET, FILM COATED ORAL at 00:37

## 2018-01-24 NOTE — ED PROVIDER NOTES
History  Chief Complaint   Patient presents with    Chest Pain     chest pain x 1 hour  comes and goes, increasing frequency  denies recent illness  This 47-year-old female presented complaining of substernal chest pain with radiation to her left jaw and shoulder that has been ongoing for approximately 6 hours at the time of my evaluation  The patient describes a constant stabbing pain that waxes and wanes  No apparent exacerbating or remitting factors  Patient states that she has had similar pain in the past   Patient has multiple medical problems including anxiety and depression, bipolar disorder, chronic pain syndrome, history of TIA, recurrent migraines, and recurrent syncope  Patient was recently hospitalized for syncope and had a formal evaluation by Cardiology at that time  Patient has no history of coronary artery disease  She does have a history of DVT about 13 years ago  He is not currently on anticoagulation  She denies any fevers, recent cough  No localized numbness, weakness, or tingling  She has been tolerating solids and liquids without difficulty  She is urinating normally and having normal bowel movements  Patient did repeatedly request pain medication despite having normal vital signs and appearing generally comfortable          History provided by:  Patient   used: No    Chest Pain   Pain location:  L chest  Pain quality: sharp and stabbing    Pain radiates to:  L jaw  Pain radiates to the back: no    Pain severity:  Moderate  Onset quality:  Gradual  Duration:  6 hours  Timing:  Constant  Progression:  Waxing and waning  Chronicity:  New  Relieved by:  Nothing  Worsened by:  Nothing tried  Ineffective treatments:  None tried  Associated symptoms: no abdominal pain, no back pain, no cough, no dizziness, no fatigue, no fever, no headache, no nausea, no numbness, no shortness of breath and not vomiting        Prior to Admission Medications   Prescriptions Last Dose Informant Patient Reported? Taking? ARIPiprazole (ABILIFY) 5 mg tablet   No No   Sig: Take 1 tablet by mouth daily   Patient taking differently: Take 15 mg by mouth daily     LORazepam (ATIVAN) 1 mg tablet   Yes No   Sig: Take 0 5 mg by mouth 3 (three) times a day   aspirin 325 mg tablet   Yes No   Sig: Take 325 mg by mouth daily  fluvoxaMINE (LUVOX) 100 mg tablet   Yes No   Sig: Take 300 mg by mouth daily at bedtime     gabapentin (NEURONTIN) 600 MG tablet   Yes No   Sig: Take 600 mg by mouth 3 (three) times a day  hydrocortisone (CORTEF) 10 mg tablet   No No   Sig: Take 1 tablet by mouth 3 (three) times a day   Patient taking differently: Take 20 mg by mouth 2 (two) times a day     lamoTRIgine (LaMICtal) 200 MG tablet   Yes No   Sig: Take 200 mg by mouth daily     lidocaine (LIDODERM) 5 %   No No   Sig: Place 1 patch on the skin daily Remove & Discard patch within 12 hours or as directed by MD   methocarbamol (ROBAXIN) 500 mg tablet   No No   Sig: Take 2 tablets by mouth 3 (three) times a day as needed for muscle spasms   midodrine (PROAMATINE) 5 mg tablet   No No   Sig: Take 2 tablets by mouth 3 (three) times a day   zonisamide (ZONEGRAN) 50 MG capsule   No No   Sig: Take 1 capsule by mouth daily      Facility-Administered Medications: None       Past Medical History:   Diagnosis Date    Adrenal insufficiency (Andrés's disease) ([address] )     Bipolar disorder     Cervical radiculopathy     Chronic back pain     DVT, lower extremity ([address] ) 1991    Fibromyalgia     History of TIAs     cannot remember details    Hypertension     Hypokalemia     Migraine     Psychiatric disorder     Anxiety, major depression, bipolar    Spinal stenosis     Syncope 2014    orthostatic hypotension       Past Surgical History:   Procedure Laterality Date    APPENDECTOMY      GASTRIC RESTRICTION SURGERY      Gastric Sleeve Nov 2015    HYSTERECTOMY      JOINT REPLACEMENT      Left Knee 2011 and Right Hip 2010 Family History   Problem Relation Age of Onset    Breast cancer Mother     Migraines Mother     Arthritis Mother     Kidney disease Father     Heart disease Father     Diabetes Father     Arthritis Father     Brain cancer Brother     Seizures Brother      I have reviewed and agree with the history as documented  Social History   Substance Use Topics    Smoking status: Former Smoker     Packs/day: 0 20     Types: Cigarettes     Quit date: 2008    Smokeless tobacco: Never Used    Alcohol use No        Review of Systems   Constitutional: Negative for activity change, appetite change, fatigue, fever and unexpected weight change  HENT: Negative for congestion, sore throat and voice change  Eyes: Negative for pain and visual disturbance  Respiratory: Negative for cough, chest tightness and shortness of breath  Cardiovascular: Positive for chest pain  Gastrointestinal: Negative for abdominal pain, diarrhea, nausea and vomiting  Endocrine: Negative for polyphagia and polyuria  Genitourinary: Negative for difficulty urinating, dysuria, flank pain, frequency and urgency  Musculoskeletal: Negative for back pain, gait problem, neck pain and neck stiffness  Skin: Negative for color change and rash  Allergic/Immunologic: Negative for immunocompromised state  Neurological: Negative for dizziness, syncope, speech difficulty, light-headedness, numbness and headaches  Hematological: Does not bruise/bleed easily  Psychiatric/Behavioral: Negative for confusion and decreased concentration         Physical Exam  ED Triage Vitals   Temperature Pulse Respirations Blood Pressure SpO2   01/23/18 1659 01/23/18 1659 01/23/18 1659 01/23/18 1659 01/23/18 1659   98 °F (36 7 °C) 77 20 135/60 97 %      Temp src Heart Rate Source Patient Position - Orthostatic VS BP Location FiO2 (%)   -- 01/23/18 2033 01/23/18 2033 01/23/18 2033 --    Monitor Lying Right arm       Pain Score       01/23/18 1659 Worst Possible Pain           Orthostatic Vital Signs  Vitals:    01/23/18 1659 01/23/18 2033 01/23/18 2245 01/24/18 0038   BP: 135/60 155/87 138/66 136/67   Pulse: 77 66 64 66   Patient Position - Orthostatic VS:  Lying Lying Lying       Physical Exam   Constitutional: She is oriented to person, place, and time  She appears well-developed and well-nourished  HENT:   Head: Normocephalic and atraumatic  Eyes: Conjunctivae and EOM are normal  Pupils are equal, round, and reactive to light  No scleral icterus  Neck: Normal range of motion  Neck supple  No tracheal deviation present  Cardiovascular: Normal rate and regular rhythm  Pulmonary/Chest: Effort normal and breath sounds normal  No respiratory distress  Abdominal: Soft  She exhibits no distension  There is no tenderness  There is no rebound and no guarding  Musculoskeletal: Normal range of motion  She exhibits no tenderness or deformity  Lymphadenopathy:     She has no cervical adenopathy  Neurological: She is alert and oriented to person, place, and time  No gross focal sensory or motor deficits   Skin: Skin is warm and dry  No rash noted  She is not diaphoretic  Psychiatric: She has a normal mood and affect  Vitals reviewed        ED Medications  Medications   aluminum-magnesium hydroxide-simethicone (MYLANTA) 200-200-20 mg/5 mL oral suspension 30 mL (30 mL Oral Given 1/23/18 2215)   ondansetron (ZOFRAN-ODT) dispersible tablet 4 mg (4 mg Oral Given 1/23/18 2244)   iohexol (OMNIPAQUE) 350 MG/ML injection (SINGLE-DOSE) 66 mL (66 mL Intravenous Given 1/23/18 2349)   acetaminophen (TYLENOL) tablet 975 mg (975 mg Oral Given 1/24/18 0037)       Diagnostic Studies  Results Reviewed     Procedure Component Value Units Date/Time    D-Dimer [[id number]]  (Abnormal) Collected:  01/23/18 2200    Lab Status:  Final result Specimen:  Blood from Arm, Left Updated:  01/23/18 2329     D-Dimer, Quant 977 (H) ng/ml (FEU)     Troponin I [[id number]] (Normal) Collected:  01/23/18 2153    Lab Status:  Final result Specimen:  Blood from Arm, Left Updated:  01/23/18 2219     Troponin I <0 02 ng/mL     Narrative:         [employer] Chemistry analyzer 99% cutoff is > 0 04 ng/mL in network labs    o cTnI 99% cutoff is useful only when applied to patients in the clinical setting of myocardial ischemia  o cTnI 99% cutoff should be interpreted in the context of clinical history, ECG findings and possibly cardiac imaging to establish correct diagnosis  o cTnI 99% cutoff may be suggestive but clearly not indicative of a coronary event without the clinical setting of myocardial ischemia  Troponin I [[id number]]  (Normal) Collected:  01/23/18 1843    Lab Status:  Final result Specimen:  Blood from Arm, Right Updated:  01/23/18 1905     Troponin I <0 02 ng/mL     Narrative:         [employer] Chemistry analyzer 99% cutoff is > 0 04 ng/mL in network labs    o cTnI 99% cutoff is useful only when applied to patients in the clinical setting of myocardial ischemia  o cTnI 99% cutoff should be interpreted in the context of clinical history, ECG findings and possibly cardiac imaging to establish correct diagnosis  o cTnI 99% cutoff may be suggestive but clearly not indicative of a coronary event without the clinical setting of myocardial ischemia      Comprehensive metabolic panel [[id number]]  (Abnormal) Collected:  01/23/18 1843    Lab Status:  Final result Specimen:  Blood from Arm, Right Updated:  01/23/18 1903     Sodium 147 (H) mmol/L      Potassium 3 7 mmol/L      Chloride 111 (H) mmol/L      CO2 27 mmol/L      Anion Gap 9 mmol/L      BUN 15 mg/dL      Creatinine 1 08 mg/dL      Glucose 98 mg/dL      Calcium 8 9 mg/dL      AST 14 U/L      ALT 15 U/L      Alkaline Phosphatase 100 U/L      Total Protein 7 0 g/dL      Albumin 3 4 (L) g/dL      Total Bilirubin 0 16 (L) mg/dL      eGFR 56 ml/min/1 73sq m     Narrative:         National Kidney Disease Education Program recommendations are as follows:  GFR calculation is accurate only with a steady state creatinine  Chronic Kidney disease less than 60 ml/min/1 73 sq  meters  Kidney failure less than 15 ml/min/1 73 sq  meters  CBC and differential [[id number]]  (Normal) Collected:  01/23/18 1843    Lab Status:  Final result Specimen:  Blood from Arm, Right Updated:  01/23/18 1852     WBC 8 70 Thousand/uL      RBC 3 94 Million/uL      Hemoglobin 11 5 g/dL      Hematocrit 35 9 %      MCV 91 fL      MCH 29 2 pg      MCHC 32 0 g/dL      RDW 14 0 %      MPV 10 5 fL      Platelets 063 Thousands/uL      nRBC 0 /100 WBCs      Neutrophils Relative 56 %      Lymphocytes Relative 32 %      Monocytes Relative 9 %      Eosinophils Relative 2 %      Basophils Relative 1 %      Neutrophils Absolute 4 98 Thousands/µL      Lymphocytes Absolute 2 77 Thousands/µL      Monocytes Absolute 0 75 Thousand/µL      Eosinophils Absolute 0 16 Thousand/µL      Basophils Absolute 0 04 Thousands/µL                  RADIOLOGY RESULTS   Final Result by  (01/24 1029)      X-ray chest 2 views   Final Result by Jake Caba MD (01/24 4388)      No active pulmonary disease  Workstation performed: [id number]         CTA ED chest PE study   Final Result by Sandhya Blair MD (01/24 8444)      No pulmonary embolus  Dependent hypoventilatory changes in the lower lobes left greater than right  Otherwise, no acute intrathoracic process or significant interval change        Findings are consistent with the preliminary report from [facility] which was provided shortly after completion of the exam                                            Workstation performed: [id number]                    Procedures  ECG 12 Lead Documentation  Date/Time: 1/23/2018 10:12 PM  Performed by: Sriram Nelson  Authorized by: Sriram Nelson     Patient location:  ED  Interpretation:     Interpretation: normal    Rhythm:     Rhythm: sinus bradycardia    Ectopy:     Ectopy: none QRS:     QRS axis:  Normal    QRS intervals:  Normal  Conduction:     Conduction: normal    ST segments:     ST segments:  Normal  T waves:     T waves: normal    Comments:      Mild sinus bradycardia, otherwise normal            Phone Contacts  ED Phone Contact    ED Course  ED Course          HEART Risk Score    Flowsheet Row Most Recent Value   History  0 Filed at: 01/26/2018 0234   ECG  0 Filed at: 01/26/2018 0234   Age  1 Filed at: 01/26/2018 0234   Risk Factors  1 Filed at: 01/26/2018 0234   Troponin  0 Filed at: 01/26/2018 0234   Heart Score Risk Calculator   History  0 Filed at: 01/26/2018 0234   ECG  0 Filed at: 01/26/2018 0234   Age  1 Filed at: 01/26/2018 0234   Risk Factors  1 Filed at: 01/26/2018 0234   Troponin  0 Filed at: 01/26/2018 0234   HEART Score  2 Filed at: 01/26/2018 0234   HEART Score  2 Filed at: 01/26/2018 0234                            MDM  Number of Diagnoses or Management Options  Non-cardiac chest pain: new and requires workup  Diagnosis management comments: 68-year-old female presented to the emergency department complaining of ongoing chest pain  Patient with relatively low risk for ACS given heart score of three  Troponin EKG performed both on initial presentation and had a three hour interval showed no signs of cardiac ischemia  Given the patient's remote history of DVT, a D-dimer was performed which was mildly elevated  A CTA of the chest was then performed to rule out pulmonary embolism  This was negative for pulmonary embolism  At this point patient is stable for discharge  I did provide her with a prescription for an outpatient stress test to be performed in the next 72 hours  We discussed return precautions         Amount and/or Complexity of Data Reviewed  Clinical lab tests: ordered and reviewed  Tests in the radiology section of CPT®: reviewed and ordered  Review and summarize past medical records: yes  Independent visualization of images, tracings, or specimens: yes      CritCare Time    Disposition  Final diagnoses:   Non-cardiac chest pain     Time reflects when diagnosis was documented in both MDM as applicable and the Disposition within this note     Time User Action Codes Description Comment    1/23/2018 10:48 PM Maki Matthews Add [R07 89] Non-cardiac chest pain       ED Disposition     ED Disposition Condition Comment    Discharge  [address] discharge to home/self care  Condition at discharge: Good        Follow-up Information     Follow up With Specialties Details Why Bobo Cárdenas 4047, MD Internal Medicine  Please arrange for follow-up with your primary care physician  If you have new or worsening symptoms please call your doctor or return to the emergency department  [place]  [phone]          Discharge Medication List as of 1/24/2018 12:12 AM      CONTINUE these medications which have NOT CHANGED    Details   ARIPiprazole (ABILIFY) 5 mg tablet Take 1 tablet by mouth daily, Starting Fri 8/4/2017, Print      aspirin 325 mg tablet Take 325 mg by mouth daily  , Until Discontinued, Historical Med      fluvoxaMINE (LUVOX) 100 mg tablet Take 300 mg by mouth daily at bedtime  , Historical Med      gabapentin (NEURONTIN) 600 MG tablet Take 600 mg by mouth 3 (three) times a day , Until Discontinued, Historical Med      hydrocortisone (CORTEF) 10 mg tablet Take 1 tablet by mouth 3 (three) times a day, Starting Fri 8/4/2017, Print      lamoTRIgine (LaMICtal) 200 MG tablet Take 200 mg by mouth daily  , Until Discontinued, Historical Med      lidocaine (LIDODERM) 5 % Place 1 patch on the skin daily Remove & Discard patch within 12 hours or as directed by MD, Starting Sun 1/7/2018, Print      LORazepam (ATIVAN) 1 mg tablet Take 0 5 mg by mouth 3 (three) times a day, Historical Med      methocarbamol (ROBAXIN) 500 mg tablet Take 2 tablets by mouth 3 (three) times a day as needed for muscle spasms, Starting Fri 8/4/2017, No Print      midodrine (PROAMATINE) 5 mg tablet Take 2 tablets by mouth 3 (three) times a day, Starting Sat 1/6/2018, Print      zonisamide (ZONEGRAN) 50 MG capsule Take 1 capsule by mouth daily, Starting Fri 8/4/2017, No Print             Outpatient Discharge Orders  Stress test only, exercise   Standing Status: Future  Standing Exp   Date: 01/24/22         ED Provider  Electronically Signed by           Rosie Quintanilla MD  01/26/18 0267

## 2018-01-24 NOTE — ED NOTES
Pt requesting additional pain medication, Dr Argelia Herring made aware        Sindi Trimble, RN  01/23/18 8379

## 2018-01-24 NOTE — DISCHARGE INSTRUCTIONS

## 2018-01-24 NOTE — ED NOTES
Will hold off on IV placement pending D-dimer results, Dr Terri García aware        Sinan Stockton, RN  01/23/18 3311

## 2018-01-25 LAB
ATRIAL RATE: 58 BPM
P AXIS: 78 DEGREES
PR INTERVAL: 168 MS
QRS AXIS: 1 DEGREES
QRSD INTERVAL: 88 MS
QT INTERVAL: 414 MS
QTC INTERVAL: 406 MS
T WAVE AXIS: 19 DEGREES
VENTRICULAR RATE: 58 BPM

## 2018-06-22 ENCOUNTER — APPOINTMENT (EMERGENCY)
Dept: CT IMAGING | Facility: HOSPITAL | Age: 58
End: 2018-06-22
Payer: COMMERCIAL

## 2018-06-22 ENCOUNTER — HOSPITAL ENCOUNTER (OUTPATIENT)
Facility: HOSPITAL | Age: 58
Setting detail: OBSERVATION
Discharge: HOME | End: 2018-06-24
Attending: EMERGENCY MEDICINE | Admitting: INTERNAL MEDICINE
Payer: COMMERCIAL

## 2018-06-22 DIAGNOSIS — R55 SYNCOPE: Primary | ICD-10-CM

## 2018-06-22 PROBLEM — I26.99 PE (PULMONARY THROMBOEMBOLISM) (HCC): Status: ACTIVE | Noted: 2018-06-22

## 2018-06-22 LAB
AMPHETAMINES SERPL QL SCN: NEGATIVE
ANION GAP SERPL CALCULATED.3IONS-SCNC: 8 MMOL/L (ref 4–13)
ANISOCYTOSIS BLD QL SMEAR: PRESENT
BARBITURATES UR QL: NEGATIVE
BASOPHILS # BLD MANUAL: 0 THOUSAND/UL (ref 0–0.1)
BASOPHILS NFR MAR MANUAL: 0 % (ref 0–1)
BENZODIAZ UR QL: NEGATIVE
BILIRUB UR QL STRIP: NEGATIVE
BUN SERPL-MCNC: 20 MG/DL (ref 5–25)
CALCIUM SERPL-MCNC: 8.3 MG/DL (ref 8.3–10.1)
CHLORIDE SERPL-SCNC: 106 MMOL/L (ref 100–108)
CLARITY UR: CLEAR
CO2 SERPL-SCNC: 29 MMOL/L (ref 21–32)
COCAINE UR QL: NEGATIVE
COLOR UR: YELLOW
CREAT SERPL-MCNC: 0.94 MG/DL (ref 0.6–1.3)
EOSINOPHIL # BLD MANUAL: 0.24 THOUSAND/UL (ref 0–0.4)
EOSINOPHIL NFR BLD MANUAL: 2 % (ref 0–6)
ERYTHROCYTE [DISTWIDTH] IN BLOOD BY AUTOMATED COUNT: 17.4 % (ref 11.6–15.1)
GFR SERPL CREATININE-BSD FRML MDRD: 67 ML/MIN/1.73SQ M
GLUCOSE SERPL-MCNC: 106 MG/DL (ref 65–140)
GLUCOSE SERPL-MCNC: 117 MG/DL (ref 65–140)
GLUCOSE UR STRIP-MCNC: NEGATIVE MG/DL
HCT VFR BLD AUTO: 33.4 % (ref 34.8–46.1)
HGB BLD-MCNC: 10.6 G/DL (ref 11.5–15.4)
HGB UR QL STRIP.AUTO: NEGATIVE
HYPERCHROMIA BLD QL SMEAR: PRESENT
KETONES UR STRIP-MCNC: NEGATIVE MG/DL
LEUKOCYTE ESTERASE UR QL STRIP: NEGATIVE
LYMPHOCYTES # BLD AUTO: 35 % (ref 14–44)
LYMPHOCYTES # BLD AUTO: 4.19 THOUSAND/UL (ref 0.6–4.47)
MCH RBC QN AUTO: 27.9 PG (ref 26.8–34.3)
MCHC RBC AUTO-ENTMCNC: 31.7 G/DL (ref 31.4–37.4)
MCV RBC AUTO: 88 FL (ref 82–98)
METHADONE UR QL: NEGATIVE
MONOCYTES # BLD AUTO: 0.6 THOUSAND/UL (ref 0–1.22)
MONOCYTES NFR BLD: 5 % (ref 4–12)
NEUTROPHILS # BLD MANUAL: 6.94 THOUSAND/UL (ref 1.85–7.62)
NEUTS BAND NFR BLD MANUAL: 3 % (ref 0–8)
NEUTS SEG NFR BLD AUTO: 55 % (ref 43–75)
NITRITE UR QL STRIP: NEGATIVE
NRBC BLD AUTO-RTO: 0 /100 WBCS
OPIATES UR QL SCN: NEGATIVE
PCP UR QL: NEGATIVE
PH UR STRIP.AUTO: 8 [PH] (ref 4.5–8)
PLATELET # BLD AUTO: 266 THOUSANDS/UL (ref 149–390)
PLATELET BLD QL SMEAR: ADEQUATE
PMV BLD AUTO: 9.7 FL (ref 8.9–12.7)
POTASSIUM SERPL-SCNC: 3.7 MMOL/L (ref 3.5–5.3)
PROT UR STRIP-MCNC: NEGATIVE MG/DL
RBC # BLD AUTO: 3.8 MILLION/UL (ref 3.81–5.12)
SODIUM SERPL-SCNC: 143 MMOL/L (ref 136–145)
SP GR UR STRIP.AUTO: 1.01 (ref 1–1.03)
THC UR QL: NEGATIVE
TOTAL CELLS COUNTED SPEC: 100
TROPONIN I SERPL-MCNC: <0.02 NG/ML
TROPONIN I SERPL-MCNC: <0.02 NG/ML
UROBILINOGEN UR QL STRIP.AUTO: 0.2 E.U./DL
WBC # BLD AUTO: 11.97 THOUSAND/UL (ref 4.31–10.16)

## 2018-06-22 PROCEDURE — 36415 COLL VENOUS BLD VENIPUNCTURE: CPT | Performed by: EMERGENCY MEDICINE

## 2018-06-22 PROCEDURE — 82948 REAGENT STRIP/BLOOD GLUCOSE: CPT

## 2018-06-22 PROCEDURE — 80048 BASIC METABOLIC PNL TOTAL CA: CPT | Performed by: EMERGENCY MEDICINE

## 2018-06-22 PROCEDURE — 81003 URINALYSIS AUTO W/O SCOPE: CPT | Performed by: PHYSICIAN ASSISTANT

## 2018-06-22 PROCEDURE — 70450 CT HEAD/BRAIN W/O DYE: CPT

## 2018-06-22 PROCEDURE — 99220 PR INITIAL OBSERVATION CARE/DAY 70 MINUTES: CPT | Performed by: PHYSICIAN ASSISTANT

## 2018-06-22 PROCEDURE — 84484 ASSAY OF TROPONIN QUANT: CPT | Performed by: EMERGENCY MEDICINE

## 2018-06-22 PROCEDURE — 85007 BL SMEAR W/DIFF WBC COUNT: CPT | Performed by: EMERGENCY MEDICINE

## 2018-06-22 PROCEDURE — 99285 EMERGENCY DEPT VISIT HI MDM: CPT

## 2018-06-22 PROCEDURE — 84484 ASSAY OF TROPONIN QUANT: CPT | Performed by: PHYSICIAN ASSISTANT

## 2018-06-22 PROCEDURE — 93005 ELECTROCARDIOGRAM TRACING: CPT

## 2018-06-22 PROCEDURE — 80307 DRUG TEST PRSMV CHEM ANLYZR: CPT | Performed by: PHYSICIAN ASSISTANT

## 2018-06-22 PROCEDURE — 71275 CT ANGIOGRAPHY CHEST: CPT

## 2018-06-22 PROCEDURE — 85027 COMPLETE CBC AUTOMATED: CPT | Performed by: EMERGENCY MEDICINE

## 2018-06-22 RX ORDER — LAMOTRIGINE 100 MG/1
200 TABLET ORAL DAILY
Status: DISCONTINUED | OUTPATIENT
Start: 2018-06-23 | End: 2018-06-24 | Stop reason: HOSPADM

## 2018-06-22 RX ORDER — ACETAMINOPHEN 325 MG/1
975 TABLET ORAL ONCE
Status: COMPLETED | OUTPATIENT
Start: 2018-06-22 | End: 2018-06-22

## 2018-06-22 RX ORDER — MIDODRINE HYDROCHLORIDE 5 MG/1
10 TABLET ORAL 3 TIMES DAILY
Status: DISCONTINUED | OUTPATIENT
Start: 2018-06-22 | End: 2018-06-24 | Stop reason: HOSPADM

## 2018-06-22 RX ORDER — METHOCARBAMOL 500 MG/1
500 TABLET, FILM COATED ORAL 4 TIMES DAILY
Status: DISCONTINUED | OUTPATIENT
Start: 2018-06-22 | End: 2018-06-24 | Stop reason: HOSPADM

## 2018-06-22 RX ORDER — LORAZEPAM 1 MG/1
2 TABLET ORAL EVERY 8 HOURS PRN
Status: DISCONTINUED | OUTPATIENT
Start: 2018-06-22 | End: 2018-06-24 | Stop reason: HOSPADM

## 2018-06-22 RX ORDER — SODIUM CHLORIDE 9 MG/ML
75 INJECTION, SOLUTION INTRAVENOUS CONTINUOUS
Status: DISCONTINUED | OUTPATIENT
Start: 2018-06-22 | End: 2018-06-24

## 2018-06-22 RX ORDER — FLUVOXAMINE MALEATE 50 MG/1
300 TABLET, COATED ORAL
Status: DISCONTINUED | OUTPATIENT
Start: 2018-06-22 | End: 2018-06-24 | Stop reason: HOSPADM

## 2018-06-22 RX ORDER — MECLIZINE HCL 12.5 MG/1
25 TABLET ORAL EVERY 8 HOURS PRN
Status: DISCONTINUED | OUTPATIENT
Start: 2018-06-22 | End: 2018-06-24 | Stop reason: HOSPADM

## 2018-06-22 RX ORDER — GABAPENTIN 300 MG/1
600 CAPSULE ORAL 3 TIMES DAILY
Status: DISCONTINUED | OUTPATIENT
Start: 2018-06-22 | End: 2018-06-24 | Stop reason: HOSPADM

## 2018-06-22 RX ORDER — MAGNESIUM 30 MG
400 TABLET ORAL DAILY
COMMUNITY
End: 2019-04-05 | Stop reason: ALTCHOICE

## 2018-06-22 RX ORDER — ASPIRIN 325 MG
325 TABLET ORAL DAILY
Status: DISCONTINUED | OUTPATIENT
Start: 2018-06-23 | End: 2018-06-24 | Stop reason: HOSPADM

## 2018-06-22 RX ORDER — MAGNESIUM HYDROXIDE/ALUMINUM HYDROXICE/SIMETHICONE 120; 1200; 1200 MG/30ML; MG/30ML; MG/30ML
30 SUSPENSION ORAL EVERY 6 HOURS PRN
Status: DISCONTINUED | OUTPATIENT
Start: 2018-06-22 | End: 2018-06-24 | Stop reason: HOSPADM

## 2018-06-22 RX ORDER — ONDANSETRON 2 MG/ML
4 INJECTION INTRAMUSCULAR; INTRAVENOUS EVERY 6 HOURS PRN
Status: DISCONTINUED | OUTPATIENT
Start: 2018-06-22 | End: 2018-06-24 | Stop reason: HOSPADM

## 2018-06-22 RX ORDER — HYDROCORTISONE 10 MG/1
10 TABLET ORAL 2 TIMES DAILY
Status: DISCONTINUED | OUTPATIENT
Start: 2018-06-22 | End: 2018-06-24 | Stop reason: HOSPADM

## 2018-06-22 RX ORDER — ACETAMINOPHEN 325 MG/1
650 TABLET ORAL EVERY 6 HOURS PRN
Status: DISCONTINUED | OUTPATIENT
Start: 2018-06-22 | End: 2018-06-24 | Stop reason: HOSPADM

## 2018-06-22 RX ADMIN — METHOCARBAMOL 500 MG: 500 TABLET ORAL at 22:57

## 2018-06-22 RX ADMIN — LORAZEPAM 2 MG: 1 TABLET ORAL at 23:05

## 2018-06-22 RX ADMIN — ACETAMINOPHEN 975 MG: 325 TABLET, FILM COATED ORAL at 20:06

## 2018-06-22 RX ADMIN — APIXABAN 5 MG: 5 TABLET, FILM COATED ORAL at 22:57

## 2018-06-22 RX ADMIN — ACETAMINOPHEN 650 MG: 325 TABLET, FILM COATED ORAL at 23:05

## 2018-06-22 RX ADMIN — FLUVOXAMINE MALEATE 300 MG: 50 TABLET, FILM COATED ORAL at 23:05

## 2018-06-22 RX ADMIN — GABAPENTIN 600 MG: 300 CAPSULE ORAL at 22:57

## 2018-06-22 RX ADMIN — IOHEXOL 85 ML: 350 INJECTION, SOLUTION INTRAVENOUS at 19:54

## 2018-06-22 RX ADMIN — HYDROCORTISONE 10 MG: 10 TABLET ORAL at 23:06

## 2018-06-22 RX ADMIN — SODIUM CHLORIDE 75 ML/HR: 0.9 INJECTION, SOLUTION INTRAVENOUS at 21:25

## 2018-06-22 RX ADMIN — MIDODRINE HYDROCHLORIDE 10 MG: 5 TABLET ORAL at 22:57

## 2018-06-22 NOTE — ED NOTES
Per [facility], CT tech, pts IV infiltrated, IV removed  Small amount of swelling noted, pt denies pain in site  CT tech applied ice to area  Dr Medardo Mario aware  Additional attempt made by myself, unsuccessful  Two unsuccessful attempts made by Kevin Cruz, RN  Will use ultrasound guidance for additional attempts         Checo Grigsby RN  06/22/18 2124

## 2018-06-22 NOTE — ED ATTENDING ATTESTATION
Garlan Osgood, MD, saw and evaluated the patient  I have discussed the patient with the resident/non-physician practitioner and agree with the resident's/non-physician practitioner's findings, Plan of Care, and MDM as documented in the resident's/non-physician practitioner's note, except where noted  All available labs and Radiology studies were reviewed  At this point I agree with the current assessment done in the Emergency Department  I have conducted an independent evaluation of this patient a history and physical is as follows:      Critical Care Time  CritCare Time    Procedures     Pt  Felt dizzy then had a syncopal event and hit the back of her head  She is on eliquis for PE diagnosed 3 months ago  C/o some back pain but always has some back pain  She has a hx of chronic pain/fibromyalgia  No cp, no sob  She has a hx of "passing out" when her bp goes low - this has happened a few times - last time was over 6 months ago and pt  Was admitted at that time    She has a cardiologist      Well-appearing, no distress, PERRL, no facial tenderness, neck - no cspine tenderness, RRR, CTA b/l, abd  -nontender, ext  - no edema

## 2018-06-23 LAB
ANION GAP SERPL CALCULATED.3IONS-SCNC: 8 MMOL/L (ref 4–13)
BUN SERPL-MCNC: 15 MG/DL (ref 5–25)
CALCIUM SERPL-MCNC: 8.4 MG/DL (ref 8.3–10.1)
CHLORIDE SERPL-SCNC: 109 MMOL/L (ref 100–108)
CO2 SERPL-SCNC: 25 MMOL/L (ref 21–32)
CREAT SERPL-MCNC: 0.85 MG/DL (ref 0.6–1.3)
ERYTHROCYTE [DISTWIDTH] IN BLOOD BY AUTOMATED COUNT: 17.4 % (ref 11.6–15.1)
GFR SERPL CREATININE-BSD FRML MDRD: 75 ML/MIN/1.73SQ M
GLUCOSE SERPL-MCNC: 95 MG/DL (ref 65–140)
GLUCOSE SERPL-MCNC: 97 MG/DL (ref 65–140)
HCT VFR BLD AUTO: 31.1 % (ref 34.8–46.1)
HGB BLD-MCNC: 9.9 G/DL (ref 11.5–15.4)
MAGNESIUM SERPL-MCNC: 2.3 MG/DL (ref 1.6–2.6)
MCH RBC QN AUTO: 27.8 PG (ref 26.8–34.3)
MCHC RBC AUTO-ENTMCNC: 31.8 G/DL (ref 31.4–37.4)
MCV RBC AUTO: 87 FL (ref 82–98)
PHOSPHATE SERPL-MCNC: 3.4 MG/DL (ref 2.7–4.5)
PLATELET # BLD AUTO: 258 THOUSANDS/UL (ref 149–390)
PMV BLD AUTO: 9.7 FL (ref 8.9–12.7)
POTASSIUM SERPL-SCNC: 3.9 MMOL/L (ref 3.5–5.3)
RBC # BLD AUTO: 3.56 MILLION/UL (ref 3.81–5.12)
SODIUM SERPL-SCNC: 142 MMOL/L (ref 136–145)
TROPONIN I SERPL-MCNC: <0.02 NG/ML
TROPONIN I SERPL-MCNC: <0.02 NG/ML
WBC # BLD AUTO: 10.31 THOUSAND/UL (ref 4.31–10.16)

## 2018-06-23 PROCEDURE — 84100 ASSAY OF PHOSPHORUS: CPT | Performed by: PHYSICIAN ASSISTANT

## 2018-06-23 PROCEDURE — 99225 PR SBSQ OBSERVATION CARE/DAY 25 MINUTES: CPT | Performed by: INTERNAL MEDICINE

## 2018-06-23 PROCEDURE — 82948 REAGENT STRIP/BLOOD GLUCOSE: CPT

## 2018-06-23 PROCEDURE — 93005 ELECTROCARDIOGRAM TRACING: CPT

## 2018-06-23 PROCEDURE — 80048 BASIC METABOLIC PNL TOTAL CA: CPT | Performed by: PHYSICIAN ASSISTANT

## 2018-06-23 PROCEDURE — 84484 ASSAY OF TROPONIN QUANT: CPT | Performed by: PHYSICIAN ASSISTANT

## 2018-06-23 PROCEDURE — 84484 ASSAY OF TROPONIN QUANT: CPT | Performed by: INTERNAL MEDICINE

## 2018-06-23 PROCEDURE — 83735 ASSAY OF MAGNESIUM: CPT | Performed by: PHYSICIAN ASSISTANT

## 2018-06-23 PROCEDURE — 85027 COMPLETE CBC AUTOMATED: CPT | Performed by: PHYSICIAN ASSISTANT

## 2018-06-23 RX ORDER — IBUPROFEN 400 MG/1
400 TABLET ORAL EVERY 6 HOURS PRN
Status: DISCONTINUED | OUTPATIENT
Start: 2018-06-23 | End: 2018-06-24 | Stop reason: HOSPADM

## 2018-06-23 RX ORDER — SUMATRIPTAN 50 MG/1
50 TABLET, FILM COATED ORAL ONCE AS NEEDED
Status: COMPLETED | OUTPATIENT
Start: 2018-06-23 | End: 2018-06-23

## 2018-06-23 RX ADMIN — FLUVOXAMINE MALEATE 300 MG: 50 TABLET, FILM COATED ORAL at 22:09

## 2018-06-23 RX ADMIN — IBUPROFEN 400 MG: 400 TABLET ORAL at 23:38

## 2018-06-23 RX ADMIN — GABAPENTIN 600 MG: 300 CAPSULE ORAL at 15:59

## 2018-06-23 RX ADMIN — Medication 400 MG: at 08:35

## 2018-06-23 RX ADMIN — METHOCARBAMOL 500 MG: 500 TABLET ORAL at 12:28

## 2018-06-23 RX ADMIN — MIDODRINE HYDROCHLORIDE 10 MG: 5 TABLET ORAL at 22:08

## 2018-06-23 RX ADMIN — ONDANSETRON 4 MG: 2 INJECTION INTRAMUSCULAR; INTRAVENOUS at 01:41

## 2018-06-23 RX ADMIN — LORAZEPAM 2 MG: 1 TABLET ORAL at 08:19

## 2018-06-23 RX ADMIN — ZONISAMIDE 125 MG: 100 CAPSULE ORAL at 08:36

## 2018-06-23 RX ADMIN — SUMATRIPTAN SUCCINATE 50 MG: 50 TABLET ORAL at 15:13

## 2018-06-23 RX ADMIN — LORAZEPAM 2 MG: 1 TABLET ORAL at 16:30

## 2018-06-23 RX ADMIN — METHOCARBAMOL 500 MG: 500 TABLET ORAL at 22:08

## 2018-06-23 RX ADMIN — ONDANSETRON 4 MG: 2 INJECTION INTRAMUSCULAR; INTRAVENOUS at 08:19

## 2018-06-23 RX ADMIN — METHOCARBAMOL 500 MG: 500 TABLET ORAL at 17:41

## 2018-06-23 RX ADMIN — METHOCARBAMOL 500 MG: 500 TABLET ORAL at 08:35

## 2018-06-23 RX ADMIN — LAMOTRIGINE 200 MG: 100 TABLET ORAL at 08:36

## 2018-06-23 RX ADMIN — SODIUM CHLORIDE 75 ML/HR: 0.9 INJECTION, SOLUTION INTRAVENOUS at 10:26

## 2018-06-23 RX ADMIN — GABAPENTIN 600 MG: 300 CAPSULE ORAL at 22:07

## 2018-06-23 RX ADMIN — APIXABAN 5 MG: 5 TABLET, FILM COATED ORAL at 08:35

## 2018-06-23 RX ADMIN — HYDROCORTISONE 10 MG: 10 TABLET ORAL at 08:36

## 2018-06-23 RX ADMIN — GABAPENTIN 600 MG: 300 CAPSULE ORAL at 08:35

## 2018-06-23 RX ADMIN — ASPIRIN 325 MG: 325 TABLET ORAL at 08:36

## 2018-06-23 RX ADMIN — MIDODRINE HYDROCHLORIDE 10 MG: 5 TABLET ORAL at 08:36

## 2018-06-23 RX ADMIN — ARIPIPRAZOLE 15 MG: 10 TABLET ORAL at 08:35

## 2018-06-23 RX ADMIN — IBUPROFEN 400 MG: 400 TABLET ORAL at 10:57

## 2018-06-23 RX ADMIN — MIDODRINE HYDROCHLORIDE 10 MG: 5 TABLET ORAL at 15:59

## 2018-06-23 RX ADMIN — SODIUM CHLORIDE 75 ML/HR: 0.9 INJECTION, SOLUTION INTRAVENOUS at 23:40

## 2018-06-23 RX ADMIN — ONDANSETRON 4 MG: 2 INJECTION INTRAMUSCULAR; INTRAVENOUS at 16:02

## 2018-06-23 RX ADMIN — HYDROCORTISONE 10 MG: 10 TABLET ORAL at 17:42

## 2018-06-23 RX ADMIN — APIXABAN 5 MG: 5 TABLET, FILM COATED ORAL at 17:41

## 2018-06-23 NOTE — PLAN OF CARE
Problem: PAIN - ADULT  Goal: Verbalizes/displays adequate comfort level or baseline comfort level  Interventions:  - Encourage patient to monitor pain and request assistance  - Assess pain using appropriate pain scale  - Administer analgesics based on type and severity of pain and evaluate response  - Implement non-pharmacological measures as appropriate and evaluate response  - Consider cultural and social influences on pain and pain management  - Notify physician/advanced practitioner if interventions unsuccessful or patient reports new pain  Outcome: Progressing      Problem: INFECTION - ADULT  Goal: Absence or prevention of progression during hospitalization  INTERVENTIONS:  - Assess and monitor for signs and symptoms of infection  - Monitor lab/diagnostic results  - Monitor all insertion sites, i e  indwelling lines, tubes, and drains  - Monitor endotracheal (as able) and nasal secretions for changes in amount and color  - [place] appropriate cooling/warming therapies per order  - Administer medications as ordered  - Instruct and encourage patient and family to use good hand hygiene technique  - Identify and instruct in appropriate isolation precautions for identified infection/condition  Outcome: Progressing    Goal: Absence of fever/infection during neutropenic period  INTERVENTIONS:  - Monitor WBC  - Implement neutropenic guidelines  Outcome: Progressing      Problem: SAFETY ADULT  Goal: Patient will remain free of falls  INTERVENTIONS:  - Assess patient frequently for physical needs  -  Identify cognitive and physical deficits and behaviors that affect risk of falls    -  [place] fall precautions as indicated by assessment   - Educate patient/family on patient safety including physical limitations  - Instruct patient to call for assistance with activity based on assessment  - Modify environment to reduce risk of injury  - Consider OT/PT consult to assist with strengthening/mobility  Outcome: Progressing    Goal: Maintain or return to baseline ADL function  INTERVENTIONS:  -  Assess patient's ability to carry out ADLs; assess patient's baseline for ADL function and identify physical deficits which impact ability to perform ADLs (bathing, care of mouth/teeth, toileting, grooming, dressing, etc )  - Assess/evaluate cause of self-care deficits   - Assess range of motion  - Assess patient's mobility; develop plan if impaired  - Assess patient's need for assistive devices and provide as appropriate  - Encourage maximum independence but intervene and supervise when necessary  ¯ Involve family in performance of ADLs  ¯ Assess for home care needs following discharge   ¯ Request OT consult to assist with ADL evaluation and planning for discharge  ¯ Provide patient education as appropriate  Outcome: Progressing    Goal: Maintain or return mobility status to optimal level  INTERVENTIONS:  - Assess patient's baseline mobility status (ambulation, transfers, stairs, etc )    - Identify cognitive and physical deficits and behaviors that affect mobility  - Identify mobility aids required to assist with transfers and/or ambulation (gait belt, sit-to-stand, lift, walker, cane, etc )  - [employer] fall precautions as indicated by assessment  - Record patient progress and toleration of activity level on Mobility SBAR; progress patient to next Phase/Stage  - Instruct patient to call for assistance with activity based on assessment  - Request Rehabilitation consult to assist with strengthening/weightbearing, etc   Outcome: Progressing      Problem: DISCHARGE PLANNING  Goal: Discharge to home or other facility with appropriate resources  INTERVENTIONS:  - Identify barriers to discharge w/patient and caregiver  - Arrange for needed discharge resources and transportation as appropriate  - Identify discharge learning needs (meds, wound care, etc )  - Arrange for interpretive services to assist at discharge as needed  - Refer to Case Management Department for coordinating discharge planning if the patient needs post-hospital services based on physician/advanced practitioner order or complex needs related to functional status, cognitive ability, or social support system  Outcome: Progressing      Problem: Knowledge Deficit  Goal: Patient/family/caregiver demonstrates understanding of disease process, treatment plan, medications, and discharge instructions  Complete learning assessment and assess knowledge base    Interventions:  - Provide teaching at level of understanding  - Provide teaching via preferred learning methods  Outcome: Progressing

## 2018-06-23 NOTE — PROGRESS NOTES
Patient feels diaphoretic, chest tightness, and "doesn't feel like herself"  NSR on the tele monitor  Blood sugar 97  See recent MAR  Blood pressure 90/70  Antonio Baker Patient alert and oriented  Neuro check intact  Charge nurse notified  Will continue to monitor

## 2018-06-23 NOTE — H&P
History and Physical - [facility] Internal Medicine    Patient Information: Lizeth Hayes 59 y o  female MRN: [id number]  Unit/Bed#: ED 09 Encounter: [id number]  Admitting Physician: Rosa Mendoza PA-C  PCP: Austin Lawrence MD  Date of Admission:  06/22/18    Assessment/Plan:    Hospital Problem List:     Principal Problem:    Syncope  Active Problems:    Bipolar depression ([place] [address] )    Adrenal insufficiency (Andrés's disease) ([place] [address] )    Spinal stenosis of lumbar region    HLD (hyperlipidemia)    Orthostatic hypotension    Migraine    PE (pulmonary thromboembolism) ([facility] )      Plan for the Primary Problem(s):  · syncope  · Admit to med/surg on telemetry  Last episode of syncope was 6 months ago which resulted in hospitalization here  She sees neuro at [facility] regularly for migraines  She had 2 recent admissions at [facility] for intractable migraine and PE  She had an ECHO in April which showed and EF of 60%  Last time she had a syncopal episode she was told to increase her midodrine which she did  She may be slightly dehydrated as she admits to not drinking much fluid since her gastric sleeve  Will order gentle IV fluids  Patient requesting pain medications after fall however she is allergic to toradol and narcotics could make hypotension worse  Will use tylenol for now and monitor  Plan for Additional Problems:   · Bipolar- continue home meds  · Adrenal insufficiency- continue hydrocortisone  · Spinal stenosis- order PT/OT consult   · Orthostatic hypotension- check orthostatics with vital sign checks  Continue midodrine  · Migraine- had recent auriculotemporal nerve block 4 days ago  · Recent PE- on eliquis   CTA today shows no PE    VTE Prophylaxis: Apixaban (Eliquis)  / sequential compression device   Code Status: full code  POLST: There is no POLST form on file for this patient (pre-hospital)    Anticipated Length of Stay:  Patient will be admitted on an Observation basis with an anticipated length of stay of  Less than 2 midnights  Justification for Hospital Stay: patient requires telemetry     Total Time for Visit, including Counseling / Coordination of Care: 45 minutes  Greater than 50% of this total time spent on direct patient counseling and coordination of care  Chief Complaint:   syncope    History of Present Illness:    Robbie Tanner is a 59 y o  female who presents with syncope  Patient states she had a syncopal episode while washing the dishes  She states the room started spinning and she became dizzy  She fell backward and hit her head  She is on eliquis for recent PE diagnosed at [facility]  She has chronic migraines and sees neurology at [facility]  She recently had a nerve block for her migraines 4 days ago  She had a recent hospital stay at [facility] for a migraine that last 1 week  She denies any chest pain or shortness of breath  She had a recent ECHO in April at [facility]    Review of Systems:    Review of Systems   Constitutional: Negative  HENT: Negative  Eyes: Negative  Respiratory: Negative  Cardiovascular: Negative  Gastrointestinal: Negative  Endocrine: Negative  Genitourinary: Negative  Musculoskeletal: Positive for back pain  Skin: Negative  Allergic/Immunologic: Negative  Neurological: Positive for dizziness, syncope and headaches  Hematological: Negative  Psychiatric/Behavioral: Negative          Past Medical and Surgical History:     Past Medical History:   Diagnosis Date    Adrenal insufficiency (Cardwell's disease) ([facility] )     Bipolar disorder     Cervical radiculopathy     Chronic back pain     DVT, lower extremity ([facility] ) 1991    Fibromyalgia     History of TIAs     cannot remember details    Hypertension     Hypokalemia     Migraine     Psychiatric disorder     Anxiety, major depression, bipolar    Spinal stenosis     Syncope 2014    orthostatic hypotension       Past Surgical History:   Procedure Laterality Date    APPENDECTOMY      GASTRIC RESTRICTION SURGERY Gastric Sleeve Nov 2015    HYSTERECTOMY      JOINT REPLACEMENT      Left Knee 2011 and Right Hip 2010       Meds/Allergies:    Prior to Admission medications    Medication Sig Start Date End Date Taking? Authorizing Provider   apixaban (ELIQUIS) 5 mg Take 5 mg by mouth 2 (two) times a day   Yes Historical Provider, MD   ARIPiprazole (ABILIFY) 5 mg tablet Take 1 tablet by mouth daily  Patient taking differently: Take 15 mg by mouth daily   8/4/17  Yes Tila Tinoco DO   aspirin 325 mg tablet Take 325 mg by mouth daily  Yes Historical Provider, MD   fluvoxaMINE (LUVOX) 100 mg tablet Take 300 mg by mouth daily at bedtime     Yes Historical Provider, MD   gabapentin (NEURONTIN) 600 MG tablet Take 600 mg by mouth 3 (three) times a day  Yes Historical Provider, MD   hydrocortisone (CORTEF) 10 mg tablet Take 1 tablet by mouth 3 (three) times a day  Patient taking differently: Take 10 mg by mouth 2 (two) times a day   8/4/17  Yes Tila Tinoco DO   lamoTRIgine (LaMICtal) 200 MG tablet Take 200 mg by mouth daily     Yes Historical Provider, MD   LORazepam (ATIVAN) 1 mg tablet Take 2 mg by mouth 3 (three) times a day     Yes Historical Provider, MD   magnesium 30 MG tablet Take 400 mg by mouth daily   Yes Historical Provider, MD   meclizine (ANTIVERT) 25 mg tablet Take 25 mg by mouth every 8 (eight) hours   9/17/17 9/17/18 Yes Historical Provider, MD   methocarbamol (ROBAXIN) 500 mg tablet Take 2 tablets by mouth 3 (three) times a day as needed for muscle spasms  Patient taking differently: Take 500 mg by mouth 4 (four) times a day   8/4/17  Yes Tila Tinoco DO   midodrine (PROAMATINE) 5 mg tablet Take 2 tablets by mouth 3 (three) times a day 1/6/18  Yes Caden Crawford MD   ondansetron (ZOFRAN-ODT) 4 mg disintegrating tablet Take 4 mg by mouth every 8 (eight) hours 12/18/17  Yes Historical Provider, MD   zonisamide (ZONEGRAN) 50 MG capsule Take 1 capsule by mouth daily  Patient taking differently: Take 125 mg by mouth daily   8/4/17   Thea Wilson DO   ALPRAZolam Freddy Pates) 1 mg tablet 1 mg 2 (two) times a day 12/7/17 6/22/18  Historical Provider, MD   ibuprofen (MOTRIN) 400 mg tablet Take 400 mg by mouth every 6 (six) hours 11/25/17 6/22/18  Historical Provider, MD   lidocaine (LIDODERM) 5 % Place 1 patch on the skin daily Remove & Discard patch within 12 hours or as directed by MD 1/7/18 6/22/18  José Manuel Byrnes MD   oxyCODONE-acetaminophen (PERCOCET) 5-325 mg per tablet TAKE 1 TABLET BY MOUTH EVERY DAY AS NEEDED FOR SEVERE PAIN (PAIN SCORE 7-10) MAX: 1 TAB/DAY 11/30/17 6/22/18  Historical Provider, MD   prochlorperazine (COMPAZINE) 10 mg tablet TAKE 1 TABLET BY MOUTH TWICE A DAY AS NEEDED FOR HEADACHE/NAUSEA, LIMIT: 3 DAYS PER WEEK 11/3/17 6/22/18  Historical Provider, MD     I have reviewed home medications with patient personally  Allergies:    Allergies   Allergen Reactions    Ketorolac Itching and Other (See Comments)     [toradol] Itching, hives    Mushroom Extract Complex     Risperidone      Other reaction(s): Unknown Reaction    Temazepam Hives       Social History:     Marital Status: /Civil Union   Occupation: unemployed  Patient Pre-hospital Living Situation: lives with   Patient Pre-hospital Level of Mobility: ambulatory  Patient Pre-hospital Diet Restrictions: s/p gastric sleeve  Substance Use History:   History   Alcohol Use No     History   Smoking Status    Former Smoker    Packs/day: 0 20    Types: Cigarettes    Quit date: 2008   Smokeless Tobacco    Never Used     History   Drug Use No       Family History:    non-contributory    Physical Exam:     Vitals:   Blood Pressure: 128/64 (06/22/18 1935)  Pulse: 65 (06/22/18 1935)  Temperature: 97 8 °F (36 6 °C) (06/22/18 1714)  Temp Source: Temporal (06/22/18 1714)  Respirations: 18 (06/22/18 1935)  Weight - Scale: 99 8 kg (220 lb) (06/22/18 1714)  SpO2: 98 % (06/22/18 1935)    Physical Exam   Constitutional: She is oriented to person, place, and time  She appears well-developed and well-nourished  No distress  HENT:   Head: Normocephalic  Hit back of head in the kitchen  No lacerations  Eyes: Conjunctivae are normal  Pupils are equal, round, and reactive to light  Neck: Normal range of motion  Neck supple  No tracheal deviation present  No thyromegaly present  Cardiovascular: Normal rate and regular rhythm  Pulmonary/Chest: Effort normal and breath sounds normal  No respiratory distress  She has no wheezes  Abdominal: Soft  Bowel sounds are normal  She exhibits no distension  There is no tenderness  There is no rebound  Musculoskeletal: Normal range of motion  She exhibits no edema, tenderness or deformity  Neurological: She is alert and oriented to person, place, and time  No focal neuro deficit   Skin: Skin is warm and dry  No rash noted  She is not diaphoretic  No erythema  No pallor  Psychiatric: She has a normal mood and affect  Her behavior is normal    Vitals reviewed  Additional Data:     Lab Results: I have personally reviewed pertinent reports  Results from last 7 days  Lab Units 06/22/18  1756   WBC Thousand/uL 11 97*   HEMOGLOBIN g/dL 10 6*   HEMATOCRIT % 33 4*   PLATELETS Thousands/uL 266   LYMPHO PCT % 35   MONO PCT MAN % 5   EOSINO PCT MANUAL % 2       Results from last 7 days  Lab Units 06/22/18  1756   SODIUM mmol/L 143   POTASSIUM mmol/L 3 7   CHLORIDE mmol/L 106   CO2 mmol/L 29   BUN mg/dL 20   CREATININE mg/dL 0 94   CALCIUM mg/dL 8 3   GLUCOSE RANDOM mg/dL 106           Imaging: I have personally reviewed pertinent reports  Ct Head Without Contrast    Result Date: 6/22/2018  Narrative: CT BRAIN - WITHOUT CONTRAST INDICATION:   trauma  COMPARISON:  January 4, 2018 TECHNIQUE:  CT examination of the brain was performed  In addition to axial images, coronal 2D reformatted images were created and submitted for interpretation    Radiation dose length product (DLP) for this visit:  01 41 28 69 59 mGy-cm   This examination, like all CT scans performed in the [facility], was performed utilizing techniques to minimize radiation dose exposure, including the use of iterative reconstruction and automated exposure control  IMAGE QUALITY:  Diagnostic  FINDINGS: PARENCHYMA:  No intracranial mass, mass effect or midline shift  No CT signs of acute infarction  No acute parenchymal hemorrhage  VENTRICLES AND EXTRA-AXIAL SPACES:  Normal for the patient's age  VISUALIZED ORBITS AND PARANASAL SINUSES:  Unremarkable  CALVARIUM AND EXTRACRANIAL SOFT TISSUES:  Normal      Impression: No acute intracranial abnormality  Workstation performed: [id number]     Cta Chest Pe Study    Result Date: 6/22/2018  Narrative: CTA - CHEST WITH IV CONTRAST - PULMONARY ANGIOGRAM INDICATION: 49-year-old woman with syncope  COMPARISON: Chest CTA 1/23/2018 TECHNIQUE: CTA examination of the chest was performed using angiographic technique according to a protocol specifically tailored to evaluate for pulmonary embolism  Axial, sagittal, and coronal 2D reformatted images were created from the source data and  submitted for interpretation  In addition, coronal 3D MIP postprocessing was performed on the acquisition scanner  Radiation dose length product (DLP) for this visit:  494 mGy-cm   This examination, like all CT scans performed in the [facility], was performed utilizing techniques to minimize radiation dose exposure, including the use of iterative reconstruction and automated exposure control  IV Contrast:  85 mL of iohexol (OMNIPAQUE)  FINDINGS: PULMONARY ARTERIAL TREE: Study is technically excellent  No pulmonary embolus is seen  LUNGS:  Minimal atelectasis  Otherwise clear lungs  There is no tracheal or endobronchial lesion  PLEURA:  Unremarkable  HEART/AORTA:  Normal heart size  The thoracic aorta is normal course, caliber and contour without evidence of aneurysm or dissection   MEDIASTINUM AND JEZ:  Prominent 1 1 cm right hilar lymph node is unchanged since 4/4/2015 (series 2 image 101)  CHEST WALL AND LOWER NECK:   Unremarkable  VISUALIZED STRUCTURES IN THE UPPER ABDOMEN:  Status post sleeve gastrectomy  OSSEOUS STRUCTURES:  No acute fracture or destructive osseous lesion  Impression: 1  Negative for acute or chronic pulmonary embolism  2   No acute findings in the chest  Workstation performed: [id number]       EKG, Pathology, and Other Studies Reviewed on Admission:   · EKG: NSR    Allscripts / Epic Records Reviewed: Yes     ** Please Note: This note has been constructed using a voice recognition system   **

## 2018-06-23 NOTE — TREATMENT PLAN
Called in relation patient feeling sweaty with chest tightness and does not feel herself sinus rhythm noted on the monitor without ST segment changes blood sugar was 97 as tested by nursing no neuro changes with stable vitals and BP of 90/70 in a patient with known hypotensive changes from orthostatic history    Will obtain 12 lead ECG and single troponin but suspect anxiety component

## 2018-06-23 NOTE — PROGRESS NOTES
[facility] Internal Medicine Progress Note  Patient: Lila Escobar 59 y o  female   MRN: [id number]  PCP: Imtiaz Chavez MD  Unit/Bed#: E5 -45 Encounter: [id number]  Date Of Visit: 06/23/18    Assessment:    Principal Problem:    Syncope  Active Problems:    Bipolar depression ([facility] )    Adrenal insufficiency (Walker's disease) ([place] [address] )    Spinal stenosis of lumbar region    HLD (hyperlipidemia)    Orthostatic hypotension    Migraine    PE (pulmonary thromboembolism) ([place] [address] )      Plan:    · Syncope probably in relation to orthostatic changes in BP and low volume from mild dehydration will continue IV hydration see no evidence of any neuro events and CTA of chest negative for PE/add compression stockings/continue midodrine t i d   · Acute kidney injury in relation to hypovolemia and minor dehydration will continue hydrate may have contributed to orthostatic changes  · Adrenal insufficiency continue Solu-Cortef also could be contributing to orthostatic changes  · Migraine 5 days ago had occipital injection may have had migraine presentation leading to syncope she described floaters on previous occasions and this occasion  Also had vertiginous symptoms and takes meclizine  · Pulmonary embolus continue on Eliquis CT imaging of brain unremarkable after fall CT of chest negative for PE  · Neck pain in relation to fall will offer ice pack and short course of NSAID  · Hyperlipidemia continue on statin      VTE Pharmacologic Prophylaxis:   Pharmacologic: Apixaban (Eliquis)  Mechanical VTE Prophylaxis in Place: Yes    Discussions with Specialists or Other Care Team Provider:  No    Time Spent for Care: 45 minutes  More than 50% of total time spent on counseling and coordination of care as described above  Subjective: Only complaint presently is some neck pain in the area of her trauma from fall she has small bruise in the area from a recent injection for her migraine history    She had orthostatic blood pressures checked this morning and had a 20 point drop from sitting to standing but did not feel it  Objective:     Vitals:   Temp (24hrs), Av 4 °F (36 9 °C), Min:97 8 °F (36 6 °C), Max:98 9 °F (37 2 °C)    HR:  [62-77] 64  Resp:  [16-18] 18  BP: (120-148)/(58-86) 121/58  SpO2:  [93 %-98 %] 95 %  Body mass index is 33 45 kg/m²  Input and Output Summary (last 24 hours):     No intake or output data in the 24 hours ending 18 0953    Physical Exam:     Physical Exam:   General appearance: alert, appears stated age and cooperative  Head: Normocephalic, without obvious abnormality, atraumatic  Lungs: clear to auscultation bilaterally  Heart: regular rate and rhythm  Abdomen: soft, non-tender; bowel sounds normal; no masses,  no organomegaly  Back: negative  Extremities: extremities normal, atraumatic, no cyanosis or edema  Neurologic: Grossly normal      Additional Data:     Labs:      Results from last 7 days  Lab Units 18  0403 18  1756   WBC Thousand/uL 10 31* 11 97*   HEMOGLOBIN g/dL 9 9* 10 6*   HEMATOCRIT % 31 1* 33 4*   PLATELETS Thousands/uL 258 266   LYMPHO PCT %  --  35   MONO PCT MAN %  --  5   EOSINO PCT MANUAL %  --  2       Results from last 7 days  Lab Units 18  0403   SODIUM mmol/L 142   POTASSIUM mmol/L 3 9   CHLORIDE mmol/L 109*   CO2 mmol/L 25   BUN mg/dL 15   CREATININE mg/dL 0 85   CALCIUM mg/dL 8 4   GLUCOSE RANDOM mg/dL 95           * I Have Reviewed All Lab Data Listed Above  * Additional Pertinent Lab Tests Reviewed: [facility] Admission Reviewed    Imaging:  Ct Head Without Contrast    Result Date: 2018  Narrative: CT BRAIN - WITHOUT CONTRAST INDICATION:   trauma  COMPARISON:  2018 TECHNIQUE:  CT examination of the brain was performed  In addition to axial images, coronal 2D reformatted images were created and submitted for interpretation  Radiation dose length product (DLP) for this visit:   41 28 69 59 mGy-cm     This examination, like all CT scans performed in the [facility], was performed utilizing techniques to minimize radiation dose exposure, including the use of iterative reconstruction and automated exposure control  IMAGE QUALITY:  Diagnostic  FINDINGS: PARENCHYMA:  No intracranial mass, mass effect or midline shift  No CT signs of acute infarction  No acute parenchymal hemorrhage  VENTRICLES AND EXTRA-AXIAL SPACES:  Normal for the patient's age  VISUALIZED ORBITS AND PARANASAL SINUSES:  Unremarkable  CALVARIUM AND EXTRACRANIAL SOFT TISSUES:  Normal      Impression: No acute intracranial abnormality  Workstation performed: [id number]     Cta Chest Pe Study    Result Date: 6/22/2018  Narrative: CTA - CHEST WITH IV CONTRAST - PULMONARY ANGIOGRAM INDICATION: 47-year-old woman with syncope  COMPARISON: Chest CTA 1/23/2018 TECHNIQUE: CTA examination of the chest was performed using angiographic technique according to a protocol specifically tailored to evaluate for pulmonary embolism  Axial, sagittal, and coronal 2D reformatted images were created from the source data and  submitted for interpretation  In addition, coronal 3D MIP postprocessing was performed on the acquisition scanner  Radiation dose length product (DLP) for this visit:  494 mGy-cm   This examination, like all CT scans performed in the [facility], was performed utilizing techniques to minimize radiation dose exposure, including the use of iterative reconstruction and automated exposure control  IV Contrast:  85 mL of iohexol (OMNIPAQUE)  FINDINGS: PULMONARY ARTERIAL TREE: Study is technically excellent  No pulmonary embolus is seen  LUNGS:  Minimal atelectasis  Otherwise clear lungs  There is no tracheal or endobronchial lesion  PLEURA:  Unremarkable  HEART/AORTA:  Normal heart size  The thoracic aorta is normal course, caliber and contour without evidence of aneurysm or dissection   MEDIASTINUM AND JEZ:  Prominent 1 1 cm right hilar lymph node is unchanged since 4/4/2015 (series 2 image 101)  CHEST WALL AND LOWER NECK:   Unremarkable  VISUALIZED STRUCTURES IN THE UPPER ABDOMEN:  Status post sleeve gastrectomy  OSSEOUS STRUCTURES:  No acute fracture or destructive osseous lesion  Impression: 1  Negative for acute or chronic pulmonary embolism  2   No acute findings in the chest  Workstation performed: [id number]     Imaging Reports Reviewed Today Include:  CT of chest Reviewed CT of brain  Imaging Personally Reviewed by Myself Includes:    Procedure: Ct Head Without Contrast    Result Date: 6/22/2018  Narrative: CT BRAIN - WITHOUT CONTRAST INDICATION:   trauma  COMPARISON:  January 4, 2018 TECHNIQUE:  CT examination of the brain was performed  In addition to axial images, coronal 2D reformatted images were created and submitted for interpretation  Radiation dose length product (DLP) for this visit:  01 41 28 69 59 mGy-cm   This examination, like all CT scans performed in the [facility], was performed utilizing techniques to minimize radiation dose exposure, including the use of iterative reconstruction and automated exposure control  IMAGE QUALITY:  Diagnostic  FINDINGS: PARENCHYMA:  No intracranial mass, mass effect or midline shift  No CT signs of acute infarction  No acute parenchymal hemorrhage  VENTRICLES AND EXTRA-AXIAL SPACES:  Normal for the patient's age  VISUALIZED ORBITS AND PARANASAL SINUSES:  Unremarkable  CALVARIUM AND EXTRACRANIAL SOFT TISSUES:  Normal      Impression: No acute intracranial abnormality  Workstation performed: [id number]     Procedure: Cta Chest Pe Study    Result Date: 6/22/2018  Narrative: CTA - CHEST WITH IV CONTRAST - PULMONARY ANGIOGRAM INDICATION: 18-year-old woman with syncope  COMPARISON: Chest CTA 1/23/2018 TECHNIQUE: CTA examination of the chest was performed using angiographic technique according to a protocol specifically tailored to evaluate for pulmonary embolism    Axial, sagittal, and coronal 2D reformatted images were created from the source data and  submitted for interpretation  In addition, coronal 3D MIP postprocessing was performed on the acquisition scanner  Radiation dose length product (DLP) for this visit:  494 mGy-cm   This examination, like all CT scans performed in the [facility], was performed utilizing techniques to minimize radiation dose exposure, including the use of iterative reconstruction and automated exposure control  IV Contrast:  85 mL of iohexol (OMNIPAQUE)  FINDINGS: PULMONARY ARTERIAL TREE: Study is technically excellent  No pulmonary embolus is seen  LUNGS:  Minimal atelectasis  Otherwise clear lungs  There is no tracheal or endobronchial lesion  PLEURA:  Unremarkable  HEART/AORTA:  Normal heart size  The thoracic aorta is normal course, caliber and contour without evidence of aneurysm or dissection  MEDIASTINUM AND JEZ:  Prominent 1 1 cm right hilar lymph node is unchanged since 4/4/2015 (series 2 image 101)  CHEST WALL AND LOWER NECK:   Unremarkable  VISUALIZED STRUCTURES IN THE UPPER ABDOMEN:  Status post sleeve gastrectomy  OSSEOUS STRUCTURES:  No acute fracture or destructive osseous lesion  Impression: 1  Negative for acute or chronic pulmonary embolism   2   No acute findings in the chest  Workstation performed: [id number]        Recent Cultures (last 7 days):           Last 24 Hours Medication List:     Current Facility-Administered Medications:  acetaminophen 650 mg Oral Q6H PRN Malheur Freshwater, PA-C    aluminum-magnesium hydroxide-simethicone 30 mL Oral Q6H PRN Ruma Freshwater, PA-C    apixaban 5 mg Oral BID Ruma Freshwater, PA-C    ARIPiprazole 15 mg Oral Daily Malheur Freshwater, PA-C    aspirin 325 mg Oral Daily Malheur Freshwater, PA-C    fluvoxaMINE 300 mg Oral HS Ruma Freshwater, PA-C    gabapentin 600 mg Oral TID Malheur Freshwater, PA-C    hydrocortisone 10 mg Oral BID Malheur Freshwater, PA-C    lamoTRIgine 200 mg Oral Daily [facility] SIRISHA Scott    LORazepam 2 mg Oral Q8H PRN Rolly Goldberg PA-C    magnesium oxide 400 mg Oral Daily Rolly Goldberg PA-C    meclizine 25 mg Oral Q8H PRN Rolly Goldberg PA-C    methocarbamol 500 mg Oral 4x Daily Rolly Goldberg PA-C    midodrine 10 mg Oral TID Rolly Goldberg PA-C    ondansetron 4 mg Intravenous Q6H PRN Rolly Goldberg PA-C    sodium chloride 75 mL/hr Intravenous Continuous Rolly Goldberg PA-C Last Rate: 75 mL/hr (06/22/18 2125)   zonisamide 125 mg Oral Daily Rolly Goldberg PA-C         Today, Patient Was Seen By: Dolores Wetzel MD    ** Please Note: Dragon 360 Dictation voice to text software may have been used in the creation of this document   **

## 2018-06-23 NOTE — CASE MANAGEMENT
Thank you,  [address] [facility] in the [facility] by Jesse Spain for 2017  Network Utilization Review Department  Phone: [phone]; Fax [phone]  ATTENTION: The Network Utilization Review Department is now centralized for our 7 Facilities  Make a note that we have a new phone and fax numbers for our Department  Please call with any questions or concerns to [phone] and carefully follow the prompts so that you are directed to the right person  All voicemails are confidential  Fax any determinations, approvals, denials, and requests for initial or continue stay review clinical to [phone]  Due to HIGH CALL volume, it would be easier if you could please send faxed requests to expedite your requests and in part, help us provide discharge notifications faster  Initial Clinical Review    Admission: Date/Time/Statement: OBSERVATION 6/22/18 @2019  Orders Placed This Encounter   Procedures    Place in Observation (expected length of stay for this patient is less than two midnights)     Standing Status:   Standing     Number of Occurrences:   1     Order Specific Question:   Admitting Physician     Answer:   Harjeet Porter     Order Specific Question:   Level of Care     Answer:   Med Surg [16]     ED Arrival Information     Expected Arrival Acuity Means of Arrival Escorted By Service Admission Type    - 6/22/2018 17:05 Urgent Walk-In Family Member General Medicine Urgent    Arrival Complaint    Syncope; Fall        Chief Complaint   Patient presents with    Syncope     Patient reports syncopal episode at home 1 hour ago  Developed dizziness while washing dishes,fell backwards and struck head on floor, LOC for breif period  Complains of R sided pain  Denies numbness, chest pain, SOB  History of Illness: 60 yo fem had syncopal episode while washing dishes  Room spinning, became dizzy, fell backward and hit head  On eliquis after recent PE diagnosis  Had nerve block for migraines 4 days ago  REcent admit for migraine last week  Echo done 4/2018  ED Vital Signs:   ED Triage Vitals [06/22/18 1714]   Temperature Pulse Respirations Blood Pressure SpO2   97 8 °F (36 6 °C) 77 16 130/68 96 %      Temp Source Heart Rate Source Patient Position - Orthostatic VS BP Location FiO2 (%)   Temporal Monitor Sitting Left arm --      Pain Score       9        Wt Readings from Last 1 Encounters:   06/22/18 99 8 kg (220 lb)   Abnormal Labs/Diagnostic Test Results:   6/22: wbc 11 97   hgb 10 6   hct 33 4   Trop wnl  uds negative     ua negative  PE study: no PE  CT head: nothing acute  6/23: wbc 10 31   hgb 9 9   hct 31 1   Cl 109      ED Treatment:   Medication Administration from 06/22/2018 1705 to 06/22/2018 2112       Date/Time Order Dose Route Action Action by Comments     06/22/2018 2006 acetaminophen (TYLENOL) tablet 975 mg 975 mg Oral Given Yoko Stevens RN           Past Medical/Surgical History:    Active Ambulatory Problems     Diagnosis Date Noted    Bipolar depression ([address] )     Adrenal insufficiency (Emmet's disease) (HCC)     Fibromyalgia     Status post gastrectomy 04/08/2016    Spinal stenosis of lumbar region 05/05/2015    Osteoarthritis of lumbosacral spine without myelopathy 09/08/2015    HLD (hyperlipidemia) 05/28/2017    Syncope 07/29/2017    Orthostatic hypotension     History of TIAs     Migraine     Neck pain 01/04/2018    Low back pain 01/04/2018     Resolved Ambulatory Problems     Diagnosis Date Noted    Hypokalemia     Vertigo 07/21/2016    Hypotension (arterial) 07/22/2016    Migraine 05/28/2017    Hemiparesthesia 05/28/2017    Chest wall pain 01/04/2018     Past Medical History:   Diagnosis Date    Adrenal insufficiency (Emmet's disease) ([address] )     Bipolar disorder     Cervical radiculopathy     Chronic back pain     DVT, lower extremity ([address] ) 1991    Fibromyalgia     History of TIAs     Hypertension     Hypokalemia  Migraine     Psychiatric disorder     Spinal stenosis     Syncope 2014       Admitting Diagnosis: Syncope [R55]    Age/Sex: 59 y o  female    Assessment/Plan:   SYNCOPE: last episode 6 months ago  Could be slightly dehydrated, doesn't drink much after gastric sleeve  Gentle IVF, tylenol for pain-pt allergic to toradol, narcotics could worsen hypotension  Cons PT/OT for spinal stenosis, check orthostatics with vitals, continue midodrine    Admission Orders:  Scheduled Meds:   Current Facility-Administered Medications:  acetaminophen 650 mg Oral Q6H PRN   X 1 on 6/22   aluminum-magnesium hydroxide-simethicone 30 mL Oral Q6H PRN   apixaban 5 mg Oral BID   ARIPiprazole 15 mg Oral Daily   aspirin 325 mg Oral Daily   fluvoxaMINE 300 mg Oral HS   gabapentin 600 mg Oral TID   hydrocortisone 10 mg Oral BID   lamoTRIgine 200 mg Oral Daily   LORazepam 2 mg Oral Q8H PRN    X 1 on 6/22, x 1 on 6/23   magnesium oxide 400 mg Oral Daily   meclizine 25 mg Oral Q8H PRN   methocarbamol 500 mg Oral 4x Daily   midodrine 10 mg Oral TID   ondansetron 4 mg Intravenous Q6H PRN   X 2 on 6/23   sodium chloride 75 mL/hr Intravenous Continuous   zonisamide 125 mg Oral Daily     hse diet  Up w/assist  Tele  incentive spirom hourly wa  Cons PT/OT  SCD's    PER MED 6/23:  · Syncope probably in relation to orthostatic changes in BP and low volume from mild dehydration will continue IV hydration see no evidence of any neuro events and CTA of chest negative for PE/add compression stockings/continue midodrine t i d   · Acute kidney injury in relation to hypovolemia and minor dehydration will continue hydrate may have contributed to orthostatic changes  · Adrenal insufficiency continue Solu-Cortef also could be contributing to orthostatic changes  · Migraine 5 days ago had occipital injection may have had migraine presentation leading to syncope she described floaters on previous occasions and this occasion    Also had vertiginous symptoms and takes meclizine  · Pulmonary embolus continue on Eliquis CT imaging of brain unremarkable after fall CT of chest negative for PE  · Neck pain in relation to fall will offer ice pack and short course of NSAID  · Hyperlipidemia continue on statin

## 2018-06-23 NOTE — ED PROVIDER NOTES
History  Chief Complaint   Patient presents with    Syncope     Patient reports syncopal episode at home 1 hour ago  Developed dizziness while washing dishes,fell backwards and struck head on floor, LOC for breif period  Complains of R sided pain  Denies numbness, chest pain, SOB  59year old female with a hx of orthostatic hypotension presents for evaluation of syncope  Patient reports approximately 1 hour PTA, she was doing the dishes, started to feel like the room was spinning, saw floaters in both eyes then had a syncopal episode  She fell backwards and hit the back of her head on the tile kitchen floor  She currently reports L sided neck pain, L hip pain  Her last episode was approximately 6 months ago in which she reports being worked up by cardiology  Denies preceding symptoms of chest pain, SOB  Prior to Admission Medications   Prescriptions Last Dose Informant Patient Reported? Taking? ARIPiprazole (ABILIFY) 5 mg tablet   No Yes   Sig: Take 1 tablet by mouth daily   Patient taking differently: Take 15 mg by mouth daily     LORazepam (ATIVAN) 1 mg tablet   Yes Yes   Sig: Take 2 mg by mouth 3 (three) times a day     apixaban (ELIQUIS) 5 mg   Yes Yes   Sig: Take 5 mg by mouth 2 (two) times a day   aspirin 325 mg tablet   Yes Yes   Sig: Take 325 mg by mouth daily  fluvoxaMINE (LUVOX) 100 mg tablet   Yes Yes   Sig: Take 300 mg by mouth daily at bedtime     gabapentin (NEURONTIN) 600 MG tablet   Yes Yes   Sig: Take 600 mg by mouth 3 (three) times a day  hydrocortisone (CORTEF) 10 mg tablet   No Yes   Sig: Take 1 tablet by mouth 3 (three) times a day   Patient taking differently: Take 10 mg by mouth 2 (two) times a day     lamoTRIgine (LaMICtal) 200 MG tablet   Yes Yes   Sig: Take 200 mg by mouth daily     magnesium 30 MG tablet   Yes Yes   Sig: Take 400 mg by mouth daily   meclizine (ANTIVERT) 25 mg tablet   Yes Yes   Sig: Take 25 mg by mouth every 8 (eight) hours     methocarbamol (ROBAXIN) 500 mg tablet   No Yes   Sig: Take 2 tablets by mouth 3 (three) times a day as needed for muscle spasms   Patient taking differently: Take 500 mg by mouth 4 (four) times a day     midodrine (PROAMATINE) 5 mg tablet   No Yes   Sig: Take 2 tablets by mouth 3 (three) times a day   ondansetron (ZOFRAN-ODT) 4 mg disintegrating tablet   Yes Yes   Sig: Take 4 mg by mouth every 8 (eight) hours   zonisamide (ZONEGRAN) 50 MG capsule   No No   Sig: Take 1 capsule by mouth daily   Patient taking differently: Take 125 mg by mouth daily        Facility-Administered Medications: None       Past Medical History:   Diagnosis Date    Adrenal insufficiency (Sampson's disease) (HCC)     Bipolar disorder     Cervical radiculopathy     Chronic back pain     [facility] [address] [facility] [address] [facility] 1991    Fibromyalgia     History of TIAs     cannot remember details    Hypertension     Hypokalemia     Migraine     Psychiatric disorder     Anxiety, major depression, bipolar    Spinal stenosis     Syncope 2014    orthostatic hypotension       Past Surgical History:   Procedure Laterality Date    APPENDECTOMY      GASTRIC RESTRICTION SURGERY      Gastric Sleeve Nov 2015    HYSTERECTOMY      JOINT REPLACEMENT      Left Knee 2011 and Right Hip 2010       Family History   Problem Relation Age of Onset    Breast cancer Mother     Migraines Mother     Arthritis Mother     Kidney disease Father     Heart disease Father     Diabetes Father     Arthritis Father     Brain cancer Brother     Seizures Brother      I have reviewed and agree with the history as documented  Social History   Substance Use Topics    Smoking status: Former Smoker     Packs/day: 0 20     Types: Cigarettes     Quit date: 2008    Smokeless tobacco: Never Used    Alcohol use No        Review of Systems   Constitutional: Negative for chills, diaphoresis and fever  HENT: Negative for congestion and rhinorrhea  Eyes: Positive for visual disturbance  Negative for pain  Respiratory: Negative for cough, shortness of breath and wheezing  Cardiovascular: Negative for chest pain and leg swelling  Gastrointestinal: Negative for abdominal pain, diarrhea, nausea and vomiting  Genitourinary: Negative for difficulty urinating, dysuria, frequency and urgency  Musculoskeletal: Positive for back pain and neck pain  Skin: Negative for color change and rash  Neurological: Positive for dizziness, syncope and light-headedness  Negative for numbness and headaches  All other systems reviewed and are negative  Physical Exam  ED Triage Vitals [06/22/18 1714]   Temperature Pulse Respirations Blood Pressure SpO2   97 8 °F (36 6 °C) 77 16 130/68 96 %      Temp Source Heart Rate Source Patient Position - Orthostatic VS BP Location FiO2 (%)   Temporal Monitor Sitting Left arm --      Pain Score       9           Orthostatic Vital Signs  Vitals:    06/22/18 1714 06/22/18 1935   BP: 130/68 128/64   Pulse: 77 65   Patient Position - Orthostatic VS: Sitting Sitting       Physical Exam   Constitutional: She is oriented to person, place, and time  She appears well-developed and well-nourished  No distress  HENT:   Head: Normocephalic and atraumatic  Eyes: Conjunctivae and EOM are normal    Neck: Normal range of motion  Neck supple  No C spine tenderness  L paraspinal TTP   Cardiovascular: Normal rate and regular rhythm  Pulmonary/Chest: Effort normal and breath sounds normal  No respiratory distress  She has no wheezes  She has no rales  Abdominal: Soft  Bowel sounds are normal  There is no tenderness  There is no guarding  Musculoskeletal: Normal range of motion  She exhibits no edema or tenderness  5/5 strength b/l UE and LE   Neurological: She is alert and oriented to person, place, and time  No cranial nerve deficit  Skin: Skin is warm  She is not diaphoretic  No erythema  Psychiatric: She has a normal mood and affect   Her behavior is normal  Nursing note and vitals reviewed  ED Medications  Medications   iohexol (OMNIPAQUE) 350 MG/ML injection (SINGLE-DOSE) 85 mL (85 mL Intravenous Given 6/22/18 1954)   acetaminophen (TYLENOL) tablet 975 mg (975 mg Oral Given 6/22/18 2006)       Diagnostic Studies  Results Reviewed     Procedure Component Value Units Date/Time    CBC and differential [[id number]]  (Abnormal) Collected:  06/22/18 1756    Lab Status:  Final result Specimen:  Blood from Arm, Right Updated:  06/22/18 1851     WBC 11 97 (H) Thousand/uL      RBC 3 80 (L) Million/uL      Hemoglobin 10 6 (L) g/dL      Hematocrit 33 4 (L) %      MCV 88 fL      MCH 27 9 pg      MCHC 31 7 g/dL      RDW 17 4 (H) %      MPV 9 7 fL      Platelets 732 Thousands/uL      nRBC 0 /100 WBCs     Narrative: This is an appended report  These results have been appended to a previously verified report  Basic metabolic panel [[id number]] Collected:  06/22/18 1756    Lab Status:  Final result Specimen:  Blood from Arm, Right Updated:  06/22/18 1828     Sodium 143 mmol/L      Potassium 3 7 mmol/L      Chloride 106 mmol/L      CO2 29 mmol/L      Anion Gap 8 mmol/L      BUN 20 mg/dL      Creatinine 0 94 mg/dL      Glucose 106 mg/dL      Calcium 8 3 mg/dL      eGFR 67 ml/min/1 73sq m     Narrative:         National Kidney Disease Education Program recommendations are as follows:  GFR calculation is accurate only with a steady state creatinine  Chronic Kidney disease less than 60 ml/min/1 73 sq  meters  Kidney failure less than 15 ml/min/1 73 sq  meters  Troponin I [[id number]]  (Normal) Collected:  06/22/18 1756    Lab Status:  Final result Specimen:  Blood from Arm, Right Updated:  06/22/18 1826     Troponin I <0 02 ng/mL     Fingerstick Glucose (POCT) [[id number]]  (Normal) Collected:  06/22/18 1745    Lab Status:  Final result Updated:  06/22/18 1747     POC Glucose 117 mg/dl                  CTA chest pe study   Final Result by Freddy West MD (06/22 2012)   1  Negative for acute or chronic pulmonary embolism  2   No acute findings in the chest       Workstation performed: [id number]         CT head without contrast   Final Result by David Argueta MD (06/22 1806)      No acute intracranial abnormality  Workstation performed: [id number]               Procedures  Procedures      Phone Consults  ED Phone Contact    ED Course  ED Course as of Jun 22 2022 Fri Jun 22, 2018   3074 Informed that patient had extrav of fluid and small amount of contrast into R antecubital IV  Evaluated patient, remains asymptomatic  Small 2cm area of ecchymosis noted  Non tender  Ice at bedside and instructed patient use but she stated there is no pain or swelling and doesn't want to use it  2022 Procedure Note: EKG  Date/Time: 06/22/18 8:22 PM   Performed by: Beatriz Amezcua  Authorized by: Beatriz Amezcua  ECG interpreted by me, the ED Provider: yes   The EKG demonstrates:  Rate 71  Rhythm NSR  QTc 406  No ST elevatons/depressions                                  MDM  Number of Diagnoses or Management Options  Syncope:   Diagnosis management comments: 59year old female on eliquus for PE presenting with syncope  Cardiac evaluation, CTA PE study  Admit for obs  CritCare Time    Disposition  Final diagnoses:   Syncope     Time reflects when diagnosis was documented in both MDM as applicable and the Disposition within this note     Time User Action Codes Description Comment    6/22/2018  8:18 PM Beatriz Amezcua Add [R55] Syncope       ED Disposition     ED Disposition Condition Comment    Admit  Case was discussed with KEITH and the patient's admission status was agreed to be Admission Status: observation status to the service of Dr Elicia Hollingsworth   Follow-up Information    None         Patient's Medications   Discharge Prescriptions    No medications on file     No discharge procedures on file  ED Provider  Attending physically available and evaluated John Conway I managed the patient along with the ED Attending      Electronically Signed by         Rodrigo Proctor DO  06/22/18 2022

## 2018-06-24 VITALS
SYSTOLIC BLOOD PRESSURE: 124 MMHG | WEIGHT: 220 LBS | DIASTOLIC BLOOD PRESSURE: 59 MMHG | TEMPERATURE: 98.6 F | OXYGEN SATURATION: 98 % | BODY MASS INDEX: 33.45 KG/M2 | HEART RATE: 62 BPM | RESPIRATION RATE: 20 BRPM

## 2018-06-24 LAB
ANION GAP SERPL CALCULATED.3IONS-SCNC: 8 MMOL/L (ref 4–13)
ATRIAL RATE: 71 BPM
BUN SERPL-MCNC: 13 MG/DL (ref 5–25)
CALCIUM SERPL-MCNC: 8.3 MG/DL (ref 8.3–10.1)
CHLORIDE SERPL-SCNC: 111 MMOL/L (ref 100–108)
CO2 SERPL-SCNC: 24 MMOL/L (ref 21–32)
CREAT SERPL-MCNC: 0.78 MG/DL (ref 0.6–1.3)
GFR SERPL CREATININE-BSD FRML MDRD: 83 ML/MIN/1.73SQ M
GLUCOSE SERPL-MCNC: 84 MG/DL (ref 65–140)
P AXIS: 47 DEGREES
POTASSIUM SERPL-SCNC: 3.9 MMOL/L (ref 3.5–5.3)
PR INTERVAL: 168 MS
QRS AXIS: -14 DEGREES
QRSD INTERVAL: 84 MS
QT INTERVAL: 374 MS
QTC INTERVAL: 406 MS
SODIUM SERPL-SCNC: 143 MMOL/L (ref 136–145)
T WAVE AXIS: 31 DEGREES
VENTRICULAR RATE: 71 BPM

## 2018-06-24 PROCEDURE — 80048 BASIC METABOLIC PNL TOTAL CA: CPT | Performed by: INTERNAL MEDICINE

## 2018-06-24 PROCEDURE — G8988 SELF CARE GOAL STATUS: HCPCS

## 2018-06-24 PROCEDURE — G8979 MOBILITY GOAL STATUS: HCPCS

## 2018-06-24 PROCEDURE — G8987 SELF CARE CURRENT STATUS: HCPCS

## 2018-06-24 PROCEDURE — 97166 OT EVAL MOD COMPLEX 45 MIN: CPT

## 2018-06-24 PROCEDURE — 97163 PT EVAL HIGH COMPLEX 45 MIN: CPT

## 2018-06-24 PROCEDURE — 99217 PR OBSERVATION CARE DISCHARGE MANAGEMENT: CPT | Performed by: INTERNAL MEDICINE

## 2018-06-24 PROCEDURE — G8978 MOBILITY CURRENT STATUS: HCPCS

## 2018-06-24 PROCEDURE — G8989 SELF CARE D/C STATUS: HCPCS

## 2018-06-24 RX ORDER — SUMATRIPTAN 50 MG/1
50 TABLET, FILM COATED ORAL ONCE
Status: COMPLETED | OUTPATIENT
Start: 2018-06-24 | End: 2018-06-24

## 2018-06-24 RX ADMIN — METHOCARBAMOL 500 MG: 500 TABLET ORAL at 08:26

## 2018-06-24 RX ADMIN — LORAZEPAM 2 MG: 1 TABLET ORAL at 00:28

## 2018-06-24 RX ADMIN — Medication 400 MG: at 08:26

## 2018-06-24 RX ADMIN — LAMOTRIGINE 200 MG: 100 TABLET ORAL at 08:26

## 2018-06-24 RX ADMIN — GABAPENTIN 600 MG: 300 CAPSULE ORAL at 08:26

## 2018-06-24 RX ADMIN — APIXABAN 5 MG: 5 TABLET, FILM COATED ORAL at 08:26

## 2018-06-24 RX ADMIN — MIDODRINE HYDROCHLORIDE 10 MG: 5 TABLET ORAL at 08:26

## 2018-06-24 RX ADMIN — SUMATRIPTAN SUCCINATE 50 MG: 50 TABLET ORAL at 11:09

## 2018-06-24 RX ADMIN — ASPIRIN 325 MG: 325 TABLET ORAL at 08:27

## 2018-06-24 RX ADMIN — ARIPIPRAZOLE 15 MG: 10 TABLET ORAL at 08:26

## 2018-06-24 RX ADMIN — ACETAMINOPHEN 650 MG: 325 TABLET, FILM COATED ORAL at 02:25

## 2018-06-24 RX ADMIN — IBUPROFEN 400 MG: 400 TABLET ORAL at 08:10

## 2018-06-24 RX ADMIN — ONDANSETRON 4 MG: 2 INJECTION INTRAMUSCULAR; INTRAVENOUS at 02:25

## 2018-06-24 RX ADMIN — LORAZEPAM 2 MG: 1 TABLET ORAL at 08:27

## 2018-06-24 RX ADMIN — ZONISAMIDE 125 MG: 100 CAPSULE ORAL at 08:26

## 2018-06-24 RX ADMIN — HYDROCORTISONE 10 MG: 10 TABLET ORAL at 08:28

## 2018-06-24 NOTE — OCCUPATIONAL THERAPY NOTE
OccupationalTherapy Evaluation(time=0845-0905)     Patient Name: Carlyn BARCENASIJAMES Date: 6/24/2018  Problem List  Patient Active Problem List   Diagnosis    Bipolar depression ([facility] )    Adrenal insufficiency (Heiskell's disease) ([facility] )    Fibromyalgia    Status post gastrectomy    Spinal stenosis of lumbar region    Osteoarthritis of lumbosacral spine without myelopathy    HLD (hyperlipidemia)    Syncope    Orthostatic hypotension    History of TIAs    Migraine    Neck pain    Low back pain    PE (pulmonary thromboembolism) ([facility] )     Past Medical History  Past Medical History:   Diagnosis Date    Adrenal insufficiency (Andrés's disease) ([facility] )     Bipolar disorder     Cervical radiculopathy     Chronic back pain     DVT, lower extremity ([place] [address] ) 1991    Fibromyalgia     History of TIAs     cannot remember details    Hypertension     Hypokalemia     Migraine     Psychiatric disorder     Anxiety, major depression, bipolar    Spinal stenosis     Syncope 2014    orthostatic hypotension     Past Surgical History  Past Surgical History:   Procedure Laterality Date    APPENDECTOMY      GASTRIC RESTRICTION SURGERY      Gastric Sleeve Nov 2015    HYSTERECTOMY      JOINT REPLACEMENT      Left Knee 2011 and Right Hip 2010 06/24/18 0905   Note Type   Note type Eval only   Restrictions/Precautions   Other Precautions Fall Risk;Pain   Pain Assessment   Pain Assessment 0-10   Pain Score 9   Pain Type Chronic pain   Pain Location Neck   Home Living   Type of Home House   Home Layout Multi-level;Bed/bath upstairs  (14 steps to 2nd)   [address];Cane   Prior Function   Lives With Spouse   ADL Assistance Independent   Falls in the last 6 months 5 to 10   Lifestyle   Autonomy PTA pt states independence with all aspects of her ADLs, transfers, ambulation; +falls=6, ocassionally home alone   Reciprocal Relationships 2 children   Service to Others worked for the post [employer]   Intrinsic Gratification owns a dog   Psychosocial   Psychosocial (WDL) WDL   Subjective   Subjective "I get dizzy when I move around "   ADL   Where Assessed Edge of bed   Eating Assistance 6  Modified independent   Grooming Assistance 6  Modified Independent   UB Bathing Assistance 5  Supervision/Setup   LB Bathing Assistance 5  Supervision/Setup   UB Dressing Assistance 5  Supervision/Setup   LB Dressing Assistance 5  Supervision/Setup   Bed Mobility   Rolling R 6  Modified independent   Rolling L 6  Modified independent   Supine to Sit 6  Modified independent   Transfers   Sit to Stand 5  Supervision   Additional items Verbal cues   Stand to Sit 5  Supervision   Additional items Verbal cues   Additional Comments bp's=136/63(supine), 138/63(EOB), 139/63(standing), 147/74(after ambulation)   Functional Mobility   Functional Mobility 5  Supervision   Additional items (w/o device)   Balance   Static Sitting Good   Dynamic Sitting Good   Static Standing Fair +   Dynamic Standing Fair   Activity Tolerance   Activity Tolerance Patient limited by fatigue   RUE Assessment   RUE Assessment WFL   RUE Strength   RUE Overall Strength Within Functional Limits - able to perform ADL tasks with strength   LUE Assessment   LUE Assessment WFL   LUE Strength   LUE Overall Strength Within Functional Limits - able to perform ADL tasks with strength   Hand Function   Gross Motor Coordination Functional   Fine Motor Coordination Functional   Sensation   Light Touch No apparent deficits   Proprioception   Proprioception No apparent deficits   Vision-Basic Assessment   Current Vision Wears glasses only for reading   Vision - Complex Assessment   Acuity Able to read clock/calendar on wall without difficulty   Perception   Inattention/Neglect Appears intact   Cognition   Overall Cognitive Status WFL   Arousal/Participation Alert   Attention Within functional limits   Orientation Level Oriented X4   Memory Within functional limits   Following Commands Follows all commands and directions without difficulty   Assessment   Limitation Decreased endurance   Prognosis Good   Assessment Pt is a 55y/o female admitted to the hospital 2* dizziness, s/p fall  Pt noted with syncope and dehydration  PTA pt states independence with all aspects of her ADLs, transfers, ambulation; +falls=6, ocassionally home alone  During initial eval, pt demonstrated slight deficits with her functional balance, functional mobility, and activity tolerance  Pt was able to demonstrate good ADL status and states no concerns about going directly home  Pt would benefit from a restorative program on unit to improve her overall endurance, balance, and mobility  Acute OT tx not indicated at this time 2* limited ADL deficits      Goals   Patient Goals "less dizziness"   Plan   OT Frequency Eval only   Recommendation   OT Discharge Recommendation Home with daily checks   Barthel Index   Feeding 10   Bathing 5   Grooming Score 5   Dressing Score 10   Bladder Score 10   Bowels Score 10   Toilet Use Score 10   Transfers (Bed/Chair) Score 10   Mobility (Level Surface) Score 0   Stairs Score 0   Barthel Index Score 70   Michelle Somers, OT

## 2018-06-24 NOTE — PHYSICAL THERAPY NOTE
PT EVALUATION    59 y o     [id number]    Syncope [R55]    Past Medical History:   Diagnosis Date    Adrenal insufficiency (Andrés's disease) ([facility] [address] )     Bipolar disorder     Cervical radiculopathy     Chronic back pain     DVT, lower extremity ([facility] [address] ) 1991    Fibromyalgia     History of TIAs     cannot remember details    Hypertension     Hypokalemia     Migraine     Psychiatric disorder     Anxiety, major depression, bipolar    Spinal stenosis     Syncope 2014    orthostatic hypotension         Past Surgical History:   Procedure Laterality Date    APPENDECTOMY      GASTRIC RESTRICTION SURGERY      Gastric Sleeve Nov 2015    HYSTERECTOMY      JOINT REPLACEMENT      Left Knee 2011 and Right Hip 2010 06/24/18 0915   Note Type   Note type Eval only   Pain Assessment   Pain Location Head   Hospital Pain Intervention(s) Repositioned; Emotional support   Pain Rating: FLACC (Rest) - Face 0   Pain Rating: FLACC (Rest) - Legs 0   Pain Rating: FLACC (Rest) - Activity 0   Pain Rating: FLACC (Rest) - Cry 0   Pain Rating: FLACC (Rest) - Consolability 0   Score: FLACC (Rest) 0   Pain Rating: FLACC (Activity) - Face 0   Pain Rating: FLACC (Activity) - Legs 0   Pain Rating: FLACC (Activity) - Activity 0   Pain Rating: FLACC (Activity) - Cry 1   Pain Rating: FLACC (Activity) - Consolability 0   Score: FLACC (Activity) 1   Home Living   Type of Home House   Home Layout Multi-level;1/2 bath on main level;Bed/bath upstairs; Able to live on main level with bedroom/bathroom;Stairs to enter with rails  (3 BECCA  Staying on first floor with shower and pull out)   Bathroom Shower/Tub Walk-in shower   Bathroom Toilet Standard   Bathroom Equipment Shower chair;Commode   [facility] Walker;Cane   Additional Comments no use of DME PTA   staying on first floor with pull out couch and bath with shower stall since incrase in migraine  ( recently hospitalized x 7 days 2* migraines)    Also noted spouse applying for FMLA given her health problems  Prior Function   Level of [place] Independent with ADLs and functional mobility   Lives With Spouse   Receives Help From Family   ADL Assistance Independent   IADLs Needs assistance  (of late given migraines)   Falls in the last 6 months 5 to 10  (6)   Vocational Unemployed  (since 2004)   Comments STates I PTA   + driving  Restrictions/Precautions   Other Precautions Contact/isolation; Fall Risk;Pain;Telemetry   General   Additional Pertinent History Pt is 58 y/o female admitted with syncope and collapse at home  Reports fall with hitting head  CT head negative  PT consulted  Up with assist   DX syncope, ROSALINO   Family/Caregiver Present No   Cognition   Overall Cognitive Status WFL   RUE Assessment   RUE Assessment WFL   LUE Assessment   LUE Assessment WFL   RLE Assessment   RLE Assessment WFL  (4/5)   LLE Assessment   LLE Assessment WFL  (4/5)   Light Touch   RLE Light Touch Grossly intact   LLE Light Touch Grossly intact   Bed Mobility   Supine to Sit 6  Modified independent   Transfers   Sit to Stand 5  Supervision   Additional items Verbal cues   Stand to Sit 5  Supervision   Additional items Verbal cues   Additional Comments orthostatic vitals monitored  See endurance  Ambulation/Elevation   Gait pattern Decreased foot clearance; Inconsistent gil   Gait Assistance 5  Supervision   Additional items Assist x 1   Assistive Device (none)   Distance 20'x1, + reports of dizziness  Balance   Static Sitting Good   Dynamic Sitting Good   Static Standing Fair +   Dynamic Standing Fair   Ambulatory Fair   Endurance Deficit   Endurance Deficit Yes   Endurance Deficit Description dizziness  Orthostatic BPs:  supine 136/63, sitting 138/63, standing 139/63 and p ambulation 147/74     Activity Tolerance   Activity Tolerance Treatment limited secondary to medical complications (Comment)  (reports of dizziness)   Medical Staff Made Aware Nurse Raysa  Nurse Made Aware Reported to Dr Darian Ibanez that pt did not have orthostatic BPs with PT    Assessment   Prognosis Good   Problem List Decreased strength;Decreased endurance; Impaired balance;Decreased mobility;Obesity   Assessment Pt is 60 y/o female admitted with syncope resulting in fall on 6/22   + ROSALINO and mild dehydration  Noted hx of migraines and recent 7 day hospitalization at [address] for same  States hx of 6 falls in last 6 months, 4 of which related to syncope, others related to trip and fall  Reports since worsening migraines, has been residing on 1st floor of home with access to bathroom  I PTA without use of AD noted  Alone days while spouse works  Reports difficulty with R hip ( hx of THR 2005) and subjective reports of weakness  LE strength grossly 4/5  Devorah bed mobility  S for transfers and short distance ambulation  No overt LOB with ambulation, however limited in tolerance to same given reports of dizziness  BPs monitored and no evidence of orthostatic hypotension  Supine 136/63, sitting 138/63, standing 139/63  After ambulation 147/74  Given limitations in activity tolerance, mild strength limitations and balance limitations may benefit from PT in order to optimize functional outcomes  May benefit from trial of OPPT for increased cervical pain and migraines to assist with pain management if medically appropriate, discussed with pt possible benefits of same  PT will follow to assist with mobility and d/c planning, however feel once medically stable will be able to return home with family support  Barriers to Discharge Decreased caregiver support   Barriers to Discharge Comments spouse works days   Goals   Patient Goals to have less migraines   STG Expiration Date 07/01/18   Short Term Goal #1 7 days:  Bed mobility and transfers with I in order to assure independent in home environment  Amb > 200'x1 wiht S/I, using AD prn in order to assure independence in her home environment  Increase overall strenght and balance 1/2 grade in order to improve functional mobilty and reduce fall risk  Increase activity tolerance to 60 minutes in order to improve endurance to functional tasks  NEgotiate stairs with Manuel in order to enter her home  Treatment Day 0   Plan   Treatment/Interventions Functional transfer training;LE strengthening/ROM; Elevations; Therapeutic exercise; Endurance training;Patient/family training;Equipment eval/education; Bed mobility;Gait training; Compensatory technique education;Continued evaluation;Spoke to nursing   PT Frequency Other (Comment)  (3-5x/wk)   Recommendation   Recommendation Outpatient PT; Home with family support   PT - OK to Discharge Yes   Additional Comments anticipate once medically stable abiltiy to d/c to home     Modified Neris Scale   Modified Neris Scale 3   Barthel Index   Feeding 10   Bathing 5   Grooming Score 5   Dressing Score 10   Bladder Score 10   Bowels Score 10   Toilet Use Score 10   Transfers (Bed/Chair) Score 10   Mobility (Level Surface) Score 0   Stairs Score 0   Barthel Index Score 70     History: co - morbidities, fall risk,  multiple lines, BECCA, recent hospitalization  Exam: impairments in locomotion, musculoskeletal, balance,posture, joint integrity, barthel 70  Clinical: unstable/unpredictable ( fall risk, tele, ongoing medical management, pain, dizziness)  Complexity:high    Pattricia Boas, PT

## 2018-06-24 NOTE — DISCHARGE SUMMARY
Discharge Summary - [facility]    Patient Information: Renard Schofield 59 y o  female MRN: [id number]  Unit/Bed#: E5 -01 Encounter: [id number]    Discharging Physician / Practitioner: Rhonda Vega MD  PCP: Davon Ribeiro MD  Admission Date: 6/22/2018  Discharge Date: 06/24/18    Reason for Admission:  Syncope    Discharge Diagnoses:  Syncope    Principal Problem:    Syncope  Active Problems:    Bipolar depression ([facility] [address] )    Adrenal insufficiency (Barry's disease) ([facility] )    Spinal stenosis of lumbar region    HLD (hyperlipidemia)    Orthostatic hypotension    Migraine    PE (pulmonary thromboembolism) ([facility] )  Resolved Problems:    * No resolved hospital problems  *      Consultations During Hospital Stay:  ·     Procedures Performed:     Ct Head Without Contrast  None  Result Date: 6/22/2018  Narrative: CT BRAIN - WITHOUT CONTRAST INDICATION:   trauma  COMPARISON:  January 4, 2018 TECHNIQUE:  CT examination of the brain was performed  In addition to axial images, coronal 2D reformatted images were created and submitted for interpretation  Radiation dose length product (DLP) for this visit:  01 41 28 69 59 mGy-cm   This examination, like all CT scans performed in the [facility], was performed utilizing techniques to minimize radiation dose exposure, including the use of iterative reconstruction and automated exposure control  IMAGE QUALITY:  Diagnostic  FINDINGS: PARENCHYMA:  No intracranial mass, mass effect or midline shift  No CT signs of acute infarction  No acute parenchymal hemorrhage  VENTRICLES AND EXTRA-AXIAL SPACES:  Normal for the patient's age  VISUALIZED ORBITS AND PARANASAL SINUSES:  Unremarkable  CALVARIUM AND EXTRACRANIAL SOFT TISSUES:  Normal      Impression: No acute intracranial abnormality   Workstation performed: [id number]     Cta Chest Pe Study    Result Date: 6/22/2018  Narrative: CTA - CHEST WITH IV CONTRAST - PULMONARY ANGIOGRAM INDICATION: 49-year-old woman with syncope  COMPARISON: Chest CTA 1/23/2018 TECHNIQUE: CTA examination of the chest was performed using angiographic technique according to a protocol specifically tailored to evaluate for pulmonary embolism  Axial, sagittal, and coronal 2D reformatted images were created from the source data and  submitted for interpretation  In addition, coronal 3D MIP postprocessing was performed on the acquisition scanner  Radiation dose length product (DLP) for this visit:  494 mGy-cm   This examination, like all CT scans performed in the [facility], was performed utilizing techniques to minimize radiation dose exposure, including the use of iterative reconstruction and automated exposure control  IV Contrast:  85 mL of iohexol (OMNIPAQUE)  FINDINGS: PULMONARY ARTERIAL TREE: Study is technically excellent  No pulmonary embolus is seen  LUNGS:  Minimal atelectasis  Otherwise clear lungs  There is no tracheal or endobronchial lesion  PLEURA:  Unremarkable  HEART/AORTA:  Normal heart size  The thoracic aorta is normal course, caliber and contour without evidence of aneurysm or dissection  MEDIASTINUM AND JEZ:  Prominent 1 1 cm right hilar lymph node is unchanged since 4/4/2015 (series 2 image 101)  CHEST WALL AND LOWER NECK:   Unremarkable  VISUALIZED STRUCTURES IN THE UPPER ABDOMEN:  Status post sleeve gastrectomy  OSSEOUS STRUCTURES:  No acute fracture or destructive osseous lesion  Impression: 1  Negative for acute or chronic pulmonary embolism  2   No acute findings in the chest  Workstation performed: [id number]         Significant Findings:   · Syncope probably in relation to orthostatic changes in BP and low volume from mild dehydration received IV hydration see no evidence of any neuro events and CTA of chest negative for PE/add compression stockings/continue midodrine t i d    Should continue compression stockings as outpatient  · Acute kidney injury in relation to hypovolemia and minor dehydration will continue hydrate may have contributed to orthostatic changes  · Adrenal insufficiency continue Solu-Cortef also could be contributing to orthostatic changes  · Migraine 5 days ago had occipital injection may have had migraine presentation leading to syncope she described floaters on previous occasions and this occasion  Also had vertiginous symptoms and takes meclizine this will continue  · Pulmonary embolus continue on Eliquis CT imaging of brain unremarkable after fall CT of chest negative for PE  · Neck pain in relation to fall will offer ice pack and short course of NSAID during hospitalization but should be stopped due to renal insufficiency  · Hyperlipidemia continue on statin     · During  Observation did exhibit orthostatic changes that improved with IV hydration and at time of discharge no longer exhibited  Still at risk for orthostatic hypotension given her history of adrenal insufficiency pulmonary embolus spinal stenosis and will continue present dosing ofmidodrine t i d  Incidental Findings:   · * had episode of chest tightness with normal EKG and normal troponin believed to be anxiety overlay    Test Results Pending at Discharge (will require follow up): · None     Outpatient Tests Requested:  · None    Complications:  None    Hospital Course:     Kavitha Moffett is a 59 y o  female patient who originally presented to the hospital on 6/22/2018 due to syncope believed to be from low volume state and responded IV hydration to continue on compression stockings as applied during hospitalization whenever out of bed  Please see above significant findings for hospital course and treatment plan    Condition at Discharge: good     Discharge Day Visit / Exam:     * Please refer to separate progress for these details *    Discharge instructions/Information to patient and family:   See after visit summary for information provided to patient and family  Provisions for Follow-Up Care:  See after visit summary for information related to follow-up care and any pertinent home health orders  Disposition:     Home    For Discharges to [facility]:   · Not Applicable to this Patient - Not Applicable to this Patient      Discharge Statement:  I spent 45 minutes discharging the patient  This time was spent on the day of discharge  I had direct contact with the patient on the day of discharge  Greater than 50% of the total time was spent examining patient, answering all patient questions, arranging and discussing plan of care with patient as well as directly providing post-discharge instructions  Additional time then spent on discharge activities  Discharge Medications:  See after visit summary for reconciled discharge medications provided to patient and family  ** Please Note: Dragon 360 Dictation voice to text software may have been used in the creation of this document   **

## 2018-06-24 NOTE — PLAN OF CARE
Problem: PHYSICAL THERAPY ADULT  Goal: Performs mobility at highest level of function for planned discharge setting  See evaluation for individualized goals  Treatment/Interventions: Functional transfer training, LE strengthening/ROM, Elevations, Therapeutic exercise, Endurance training, Patient/family training, Equipment eval/education, Bed mobility, Gait training, Compensatory technique education, Continued evaluation, Spoke to nursing          See flowsheet documentation for full assessment, interventions and recommendations  Prognosis: Good  Problem List: Decreased strength, Decreased endurance, Impaired balance, Decreased mobility, Obesity  Assessment: Pt is 60 y/o female admitted with syncope resulting in fall on 6/22   + ROSALINO and mild dehydration  Noted hx of migraines and recent 7 day hospitalization at [address] for [facility]  States hx of 6 falls in last 6 months, 4 of which related to syncope, others related to trip and fall  Reports since worsening migraines, has been residing on 1st floor of home with access to bathroom  I PTA without use of AD noted  Alone days while spouse works  Reports difficulty with R hip ( hx of THR 2005) and subjective reports of weakness  LE strength grossly 4/5  Devorah bed mobility  S for transfers and short distance ambulation  No overt LOB with ambulation, however limited in tolerance to same given reports of dizziness  BPs monitored and no evidence of orthostatic hypotension  Supine 136/63, sitting 138/63, standing 139/63  After ambulation 147/74  Given limitations in activity tolerance, mild strength limitations and balance limitations may benefit from PT in order to optimize functional outcomes  May benefit from trial of OPPT for increased cervical pain and migraines to assist with pain management if medically appropriate, discussed with pt possible benefits of same    PT will follow to assist with mobility and d/c planning, however feel once medically stable will be able to return home with family support  Barriers to Discharge: Decreased caregiver support  Barriers to Discharge Comments: spouse works days  Recommendation: Outpatient PT, Home with family support     PT - OK to Discharge: Yes    See flowsheet documentation for full assessment

## 2018-06-25 LAB
ATRIAL RATE: 70 BPM
P AXIS: 14 DEGREES
PR INTERVAL: 158 MS
QRS AXIS: -20 DEGREES
QRSD INTERVAL: 94 MS
QT INTERVAL: 396 MS
QTC INTERVAL: 427 MS
T WAVE AXIS: -5 DEGREES
VENTRICULAR RATE: 70 BPM

## 2018-06-25 PROCEDURE — 93010 ELECTROCARDIOGRAM REPORT: CPT | Performed by: INTERNAL MEDICINE

## 2018-07-09 ENCOUNTER — HOSPITAL ENCOUNTER (EMERGENCY)
Facility: HOSPITAL | Age: 58
Discharge: HOME | End: 2018-07-09
Attending: EMERGENCY MEDICINE | Admitting: EMERGENCY MEDICINE
Payer: COMMERCIAL

## 2018-07-09 VITALS
OXYGEN SATURATION: 100 % | WEIGHT: 233 LBS | TEMPERATURE: 97.4 F | HEART RATE: 83 BPM | SYSTOLIC BLOOD PRESSURE: 147 MMHG | BODY MASS INDEX: 35.43 KG/M2 | DIASTOLIC BLOOD PRESSURE: 69 MMHG | RESPIRATION RATE: 16 BRPM

## 2018-07-09 DIAGNOSIS — E87.6 HYPOKALEMIA: Primary | ICD-10-CM

## 2018-07-09 DIAGNOSIS — R19.7 DIARRHEA: ICD-10-CM

## 2018-07-09 LAB
ANION GAP SERPL CALCULATED.3IONS-SCNC: 9 MMOL/L (ref 4–13)
ATRIAL RATE: 85 BPM
BASOPHILS # BLD AUTO: 0.04 THOUSANDS/ΜL (ref 0–0.1)
BASOPHILS NFR BLD AUTO: 1 % (ref 0–1)
BILIRUB UR QL STRIP: NEGATIVE
BUN SERPL-MCNC: 12 MG/DL (ref 5–25)
CALCIUM SERPL-MCNC: 8.6 MG/DL (ref 8.3–10.1)
CHLORIDE SERPL-SCNC: 107 MMOL/L (ref 100–108)
CLARITY UR: CLEAR
CLARITY, POC: CLEAR
CO2 SERPL-SCNC: 27 MMOL/L (ref 21–32)
COLOR UR: YELLOW
COLOR, POC: YELLOW
CREAT SERPL-MCNC: 0.94 MG/DL (ref 0.6–1.3)
EOSINOPHIL # BLD AUTO: 0.15 THOUSAND/ΜL (ref 0–0.61)
EOSINOPHIL NFR BLD AUTO: 2 % (ref 0–6)
ERYTHROCYTE [DISTWIDTH] IN BLOOD BY AUTOMATED COUNT: 16.5 % (ref 11.6–15.1)
GFR SERPL CREATININE-BSD FRML MDRD: 67 ML/MIN/1.73SQ M
GLUCOSE SERPL-MCNC: 112 MG/DL (ref 65–140)
GLUCOSE UR STRIP-MCNC: NEGATIVE MG/DL
HCT VFR BLD AUTO: 33.6 % (ref 34.8–46.1)
HGB BLD-MCNC: 10.5 G/DL (ref 11.5–15.4)
HGB UR QL STRIP.AUTO: NEGATIVE
KETONES UR STRIP-MCNC: NEGATIVE MG/DL
LEUKOCYTE ESTERASE UR QL STRIP: NEGATIVE
LYMPHOCYTES # BLD AUTO: 1.42 THOUSANDS/ΜL (ref 0.6–4.47)
LYMPHOCYTES NFR BLD AUTO: 20 % (ref 14–44)
MAGNESIUM SERPL-MCNC: 2.1 MG/DL (ref 1.6–2.6)
MCH RBC QN AUTO: 27.5 PG (ref 26.8–34.3)
MCHC RBC AUTO-ENTMCNC: 31.3 G/DL (ref 31.4–37.4)
MCV RBC AUTO: 88 FL (ref 82–98)
MONOCYTES # BLD AUTO: 0.62 THOUSAND/ΜL (ref 0.17–1.22)
MONOCYTES NFR BLD AUTO: 9 % (ref 4–12)
NEUTROPHILS # BLD AUTO: 4.76 THOUSANDS/ΜL (ref 1.85–7.62)
NEUTS SEG NFR BLD AUTO: 68 % (ref 43–75)
NITRITE UR QL STRIP: NEGATIVE
NRBC BLD AUTO-RTO: 0 /100 WBCS
P AXIS: 13 DEGREES
PH UR STRIP.AUTO: 5 [PH] (ref 4.5–8)
PLATELET # BLD AUTO: 311 THOUSANDS/UL (ref 149–390)
PMV BLD AUTO: 9.3 FL (ref 8.9–12.7)
POTASSIUM SERPL-SCNC: 2.9 MMOL/L (ref 3.5–5.3)
PR INTERVAL: 152 MS
PROT UR STRIP-MCNC: NEGATIVE MG/DL
QRS AXIS: -23 DEGREES
QRSD INTERVAL: 98 MS
QT INTERVAL: 398 MS
QTC INTERVAL: 473 MS
RBC # BLD AUTO: 3.82 MILLION/UL (ref 3.81–5.12)
SODIUM SERPL-SCNC: 143 MMOL/L (ref 136–145)
SP GR UR STRIP.AUTO: 1.01 (ref 1–1.03)
T WAVE AXIS: 20 DEGREES
UROBILINOGEN UR QL STRIP.AUTO: 0.2 E.U./DL
VENTRICULAR RATE: 85 BPM
WBC # BLD AUTO: 6.99 THOUSAND/UL (ref 4.31–10.16)

## 2018-07-09 PROCEDURE — 96361 HYDRATE IV INFUSION ADD-ON: CPT

## 2018-07-09 PROCEDURE — 96366 THER/PROPH/DIAG IV INF ADDON: CPT

## 2018-07-09 PROCEDURE — 83735 ASSAY OF MAGNESIUM: CPT | Performed by: EMERGENCY MEDICINE

## 2018-07-09 PROCEDURE — 99284 EMERGENCY DEPT VISIT MOD MDM: CPT

## 2018-07-09 PROCEDURE — 80048 BASIC METABOLIC PNL TOTAL CA: CPT | Performed by: EMERGENCY MEDICINE

## 2018-07-09 PROCEDURE — 96365 THER/PROPH/DIAG IV INF INIT: CPT

## 2018-07-09 PROCEDURE — 36415 COLL VENOUS BLD VENIPUNCTURE: CPT | Performed by: EMERGENCY MEDICINE

## 2018-07-09 PROCEDURE — 81003 URINALYSIS AUTO W/O SCOPE: CPT

## 2018-07-09 PROCEDURE — 93010 ELECTROCARDIOGRAM REPORT: CPT | Performed by: INTERNAL MEDICINE

## 2018-07-09 PROCEDURE — 93005 ELECTROCARDIOGRAM TRACING: CPT

## 2018-07-09 PROCEDURE — 85025 COMPLETE CBC W/AUTO DIFF WBC: CPT | Performed by: EMERGENCY MEDICINE

## 2018-07-09 RX ORDER — POTASSIUM CHLORIDE 20 MEQ/1
40 TABLET, EXTENDED RELEASE ORAL ONCE
Status: COMPLETED | OUTPATIENT
Start: 2018-07-09 | End: 2018-07-09

## 2018-07-09 RX ORDER — POTASSIUM CHLORIDE 14.9 MG/ML
20 INJECTION INTRAVENOUS ONCE
Status: COMPLETED | OUTPATIENT
Start: 2018-07-09 | End: 2018-07-09

## 2018-07-09 RX ORDER — ACETAMINOPHEN 325 MG/1
650 TABLET ORAL ONCE
Status: COMPLETED | OUTPATIENT
Start: 2018-07-09 | End: 2018-07-09

## 2018-07-09 RX ADMIN — SODIUM CHLORIDE 1000 ML: 0.9 INJECTION, SOLUTION INTRAVENOUS at 11:57

## 2018-07-09 RX ADMIN — POTASSIUM CHLORIDE 20 MEQ: 200 INJECTION, SOLUTION INTRAVENOUS at 13:05

## 2018-07-09 RX ADMIN — ACETAMINOPHEN 650 MG: 325 TABLET, FILM COATED ORAL at 14:51

## 2018-07-09 RX ADMIN — POTASSIUM CHLORIDE 40 MEQ: 1500 TABLET, EXTENDED RELEASE ORAL at 13:03

## 2018-07-09 NOTE — ED PROVIDER NOTES
History  Chief Complaint   Patient presents with    Diarrhea     diarrhea since saturday, also seen at [address] [place] [address] on Saturday and treated for hypokalemia  A 78-year-old female with past history of adrenal insufficiency, bipolar disorder, DVT, fibromyalgia, hypertension, migraines, anxiety and depression; presents with diarrhea  Patient states she has been having diarrhea for the past 3 days  Patient reports having up to 7 episodes per day  Diarrhea has been nonbloody  Patient denies associated abdominal pain, nausea and vomiting  Patient states today she developed lightheadedness, which prompted ED evaluation  Patient otherwise denies fever, chills, chest pain, shortness of breath, dysuria, urinary frequency/urgency, peripheral edema and rashes  Patient states she was seen at [facility] when symptoms began 3 days ago after having a syncopal event  Patient was found to have hypokalemia at that time  Patient was started on potassium repletion 20 meq at that time which she has been compliant with  Patient does report having a history of diarrhea alternating with constipation  A/P:  Diarrhea, now associated with lightheadedness  Benign abdominal exam   Will check lab work for infection, anemia, electrolyte abnormality and renal impairment  Will check EKG for arrhythmia given lightheadedness  Will give IV fluid bolus  History provided by:  Patient and medical records      Prior to Admission Medications   Prescriptions Last Dose Informant Patient Reported? Taking? ARIPiprazole (ABILIFY) 5 mg tablet   No No   Sig: Take 1 tablet by mouth daily   Patient taking differently: Take 15 mg by mouth daily     LORazepam (ATIVAN) 1 mg tablet   Yes No   Sig: Take 2 mg by mouth 3 (three) times a day     apixaban (ELIQUIS) 5 mg   Yes No   Sig: Take 5 mg by mouth 2 (two) times a day   aspirin 325 mg tablet   Yes No   Sig: Take 325 mg by mouth daily     fluvoxaMINE (LUVOX) 100 mg tablet   Yes No   Sig: Take 300 mg by mouth daily at bedtime     gabapentin (NEURONTIN) 600 MG tablet   Yes No   Sig: Take 600 mg by mouth 3 (three) times a day  hydrocortisone (CORTEF) 10 mg tablet   No No   Sig: Take 1 tablet by mouth 3 (three) times a day   Patient taking differently: Take 10 mg by mouth 2 (two) times a day     lamoTRIgine (LaMICtal) 200 MG tablet   Yes No   Sig: Take 200 mg by mouth daily     magnesium 30 MG tablet   Yes No   Sig: Take 400 mg by mouth daily   meclizine (ANTIVERT) 25 mg tablet   Yes No   Sig: Take 25 mg by mouth every 8 (eight) hours     methocarbamol (ROBAXIN) 500 mg tablet   No No   Sig: Take 2 tablets by mouth 3 (three) times a day as needed for muscle spasms   Patient taking differently: Take 500 mg by mouth 4 (four) times a day     midodrine (PROAMATINE) 5 mg tablet   No No   Sig: Take 2 tablets by mouth 3 (three) times a day   ondansetron (ZOFRAN-ODT) 4 mg disintegrating tablet   Yes No   Sig: Take 4 mg by mouth every 8 (eight) hours   zonisamide (ZONEGRAN) 50 MG capsule   No No   Sig: Take 1 capsule by mouth daily   Patient taking differently: Take 125 mg by mouth daily        Facility-Administered Medications: None       Past Medical History:   Diagnosis Date    Adrenal insufficiency (Orange's disease) ([facility] [address] )     Bipolar disorder     Cervical radiculopathy     Chronic back pain     DVT, lower extremity (HCC) 1991    Fibromyalgia     History of TIAs     cannot remember details    Hypertension     Hypokalemia     Migraine     Psychiatric disorder     Anxiety, major depression, bipolar    Spinal stenosis     Syncope 2014    orthostatic hypotension       Past Surgical History:   Procedure Laterality Date    APPENDECTOMY      GASTRIC RESTRICTION SURGERY      Gastric Sleeve Nov 2015    HYSTERECTOMY      JOINT REPLACEMENT      Left Knee 2011 and Right Hip 2010       Family History   Problem Relation Age of Onset    Breast cancer Mother     Migraines Mother     Arthritis Mother     Kidney disease Father     Heart disease Father     Diabetes Father     Arthritis Father     Brain cancer Brother     Seizures Brother      I have reviewed and agree with the history as documented  Social History   Substance Use Topics    Smoking status: Former Smoker     Packs/day: 0 20     Types: Cigarettes     Quit date: 2008    Smokeless tobacco: Never Used    Alcohol use No        Review of Systems   Cardiovascular: Positive for palpitations  Gastrointestinal: Positive for diarrhea  Neurological: Positive for light-headedness  All other systems reviewed and are negative        Physical Exam  Physical Exam   General Appearance: alert and oriented, nad, non toxic appearing  Skin:  Warm, dry, intact  HEENT: atraumatic, normocephalic  Neck: Supple, trachea midline  Cardiac: RRR; no murmurs, rub, gallops  Pulmonary: lungs CTAB; no wheezes, rales, rhonchi  Gastrointestinal: abdomen soft, nontender, nondistended; no guarding or rebound tenderness; good bowel sounds, no mass or bruits  Extremities:  no pedal edema, 2+ pulses; no calf tenderness, no clubbing, no cyanosis  Neuro:  no focal motor or sensory deficits, CN 2-12 grossly intact  Psych:  Normal mood and affect, normal judgement and insight      Vital Signs  ED Triage Vitals [07/09/18 1114]   Temperature Pulse Respirations Blood Pressure SpO2   (!) 97 4 °F (36 3 °C) (!) 113 18 161/73 95 %      Temp Source Heart Rate Source Patient Position - Orthostatic VS BP Location FiO2 (%)   Temporal Monitor Sitting Right arm --      Pain Score       No Pain           Vitals:    07/09/18 1114 07/09/18 1309 07/09/18 1426   BP: 161/73 139/69 147/69   Pulse: (!) 113 78 83   Patient Position - Orthostatic VS: Sitting Lying Lying       Visual Acuity      ED Medications  Medications   sodium chloride 0 9 % bolus 1,000 mL (0 mL Intravenous Stopped 7/9/18 1504)   potassium chloride (K-DUR,KLOR-CON) CR tablet 40 mEq (40 mEq Oral Given 7/9/18 1303)   potassium chloride 20 mEq IVPB (premix) (0 mEq Intravenous Stopped 7/9/18 1504)   acetaminophen (TYLENOL) tablet 650 mg (650 mg Oral Given 7/9/18 1451)       Diagnostic Studies  Results Reviewed     Procedure Component Value Units Date/Time    Basic metabolic panel [[id number]]  (Abnormal) Collected:  07/09/18 1157    Lab Status:  Final result Specimen:  Blood from Arm, Right Updated:  07/09/18 1218     Sodium 143 mmol/L      Potassium 2 9 (L) mmol/L      Chloride 107 mmol/L      CO2 27 mmol/L      Anion Gap 9 mmol/L      BUN 12 mg/dL      Creatinine 0 94 mg/dL      Glucose 112 mg/dL      Calcium 8 6 mg/dL      eGFR 67 ml/min/1 73sq m     Narrative:         National Kidney Disease Education Program recommendations are as follows:  GFR calculation is accurate only with a steady state creatinine  Chronic Kidney disease less than 60 ml/min/1 73 sq  meters  Kidney failure less than 15 ml/min/1 73 sq  meters      Magnesium [[id number]]  (Normal) Collected:  07/09/18 1157    Lab Status:  Final result Specimen:  Blood from Arm, Right Updated:  07/09/18 1218     Magnesium 2 1 mg/dL     CBC and differential [[id number]]  (Abnormal) Collected:  07/09/18 1157    Lab Status:  Final result Specimen:  Blood from Arm, Right Updated:  07/09/18 1203     WBC 6 99 Thousand/uL      RBC 3 82 Million/uL      Hemoglobin 10 5 (L) g/dL      Hematocrit 33 6 (L) %      MCV 88 fL      MCH 27 5 pg      MCHC 31 3 (L) g/dL      RDW 16 5 (H) %      MPV 9 3 fL      Platelets 889 Thousands/uL      nRBC 0 /100 WBCs      Neutrophils Relative 68 %      Lymphocytes Relative 20 %      Monocytes Relative 9 %      Eosinophils Relative 2 %      Basophils Relative 1 %      Neutrophils Absolute 4 76 Thousands/µL      Lymphocytes Absolute 1 42 Thousands/µL      Monocytes Absolute 0 62 Thousand/µL      Eosinophils Absolute 0 15 Thousand/µL      Basophils Absolute 0 04 Thousands/µL     POCT urinalysis dipstick [[id number]]  (Normal) Resulted:  07/09/18 1134    Lab Status:  Final result Updated: 07/09/18 1134     Color, UA Yellow     Clarity, UA Clear    ED Urine Macroscopic [[id number]] Collected:  07/09/18 1132    Lab Status:  Final result Specimen:  Urine Updated:  07/09/18 1126     Color, UA Yellow     Clarity, UA Clear     pH, UA 5 0     Leukocytes, UA Negative     Nitrite, UA Negative     Protein, UA Negative mg/dl      Glucose, UA Negative mg/dl      Ketones, UA Negative mg/dl      Urobilinogen, UA 0 2 E U /dl      Bilirubin, UA Negative     Blood, UA Negative     Specific Gravity, UA 1 015    Narrative:       CLINITEK RESULT                 No orders to display              Procedures  Procedures   ECG 12 Lead Documentation  Date/Time: today/date: 7/9/2018  Performed by: Yasmeen Alfredo    ECG reviewed by me, the ED Provider: yes    Patient location:  ED   Previous ECG:  Compared to current, no change   Rate:  85  ECG rate assessment: normal    Rhythm: sinus rhythm    Ectopy:  none    QRS axis:  Normal  Intervals: normal   Q waves: None   ST segments:  Normal  T waves: normal      Impression: Normal EKG         Phone Contacts  ED Phone Contact    ED Course  ED Course as of Jul 09 1701 Mon Jul 09, 2018   1203 Baseline Hemoglobin: (!) 10 5   1242 Will replete with PO and IV  Likely secondary to diarrhea  Pt updated and in agreement with plan  Potassium: (!) 2 9   1243 Remainder of labs within normal limits  1333 Improved since arrival Pulse: 78   1525 Pt completed IVF and potassium supplementation  Pt feeling much better at this time                                  MDM  CritCare Time    Disposition  Final diagnoses:   Hypokalemia   Diarrhea     Time reflects when diagnosis was documented in both MDM as applicable and the Disposition within this note     Time User Action Codes Description Comment    7/9/2018  3:28 PM Yolis Garcia [address] [[address] 6] Hypokalemia     7/9/2018  3:28 PM Yasmeen Alfredo Add [R19 7] Diarrhea       ED Disposition     ED Disposition Condition Comment    Discharge  Nadira Jimenez Nicho discharge to home/self care  Condition at discharge: Good        Follow-up Information     Follow up With Specialties Details Why Jorge Luis Christopher MD Internal Medicine Schedule an appointment as soon as possible for a visit in 2 days For re-evaluation  You should have your potassium level rechecked in 1 week [address] [facility] [address] [facility] 3  [phone]            Discharge Medication List as of 7/9/2018  3:29 PM      CONTINUE these medications which have NOT CHANGED    Details   apixaban (ELIQUIS) 5 mg Take 5 mg by mouth 2 (two) times a day, Historical Med      ARIPiprazole (ABILIFY) 5 mg tablet Take 1 tablet by mouth daily, Starting Fri 8/4/2017, Print      aspirin 325 mg tablet Take 325 mg by mouth daily  , Until Discontinued, Historical Med      fluvoxaMINE (LUVOX) 100 mg tablet Take 300 mg by mouth daily at bedtime  , Historical Med      gabapentin (NEURONTIN) 600 MG tablet Take 600 mg by mouth 3 (three) times a day , Until Discontinued, Historical Med      hydrocortisone (CORTEF) 10 mg tablet Take 1 tablet by mouth 3 (three) times a day, Starting Fri 8/4/2017, Print      lamoTRIgine (LaMICtal) 200 MG tablet Take 200 mg by mouth daily  , Until Discontinued, Historical Med      LORazepam (ATIVAN) 1 mg tablet Take 2 mg by mouth 3 (three) times a day  , Historical Med      magnesium 30 MG tablet Take 400 mg by mouth daily, Historical Med      meclizine (ANTIVERT) 25 mg tablet Take 25 mg by mouth every 8 (eight) hours  , Starting Sun 9/17/2017, Until Mon 9/17/2018, Historical Med      methocarbamol (ROBAXIN) 500 mg tablet Take 2 tablets by mouth 3 (three) times a day as needed for muscle spasms, Starting Fri 8/4/2017, No Print      midodrine (PROAMATINE) 5 mg tablet Take 2 tablets by mouth 3 (three) times a day, Starting Sat 1/6/2018, Print      ondansetron (ZOFRAN-ODT) 4 mg disintegrating tablet Take 4 mg by mouth every 8 (eight) hours, Starting Mon 12/18/2017, Historical Med      zonisamide (ZONEGRAN) 50 MG capsule Take 1 capsule by mouth daily, Starting Fri 8/4/2017, No Print           No discharge procedures on file      ED Provider  Electronically Signed by           Mariela Pedro DO  07/09/18 1061

## 2018-07-09 NOTE — DISCHARGE INSTRUCTIONS
Hypokalemia   WHAT YOU NEED TO KNOW:   Hypokalemia is a low level of potassium in your blood  Potassium helps control how your muscles, heart, and digestive system work  Hypokalemia occurs when your body loses too much potassium or does not absorb enough from food  DISCHARGE INSTRUCTIONS:   Return to the emergency department if:   · You cannot move your arm or leg  · You have a fast or irregular heartbeat  · You are too tired or weak to stand up  Contact your healthcare provider if:   · You are vomiting, or you have diarrhea  · You have numbness or tingling in your arms or legs  · Your symptoms do not go away or they get worse  · You have questions or concerns about your condition or care  Medicines:   · Potassium  will be given to bring your potassium levels back to normal     · Take your medicine as directed  Contact your healthcare provider if you think your medicine is not helping or if you have side effects  Tell him of her if you are allergic to any medicine  Keep a list of the medicines, vitamins, and herbs you take  Include the amounts, and when and why you take them  Bring the list or the pill bottles to follow-up visits  Carry your medicine list with you in case of an emergency  Eat foods that are high in potassium:  Foods that are high in potassium include bananas, oranges, tomatoes, potatoes, and avocado  [place] beans, turkey, salmon, lean beef, yogurt, and milk are also high in potassium  Ask your healthcare provider or dietitian for more information about foods that are high in potassium  Follow up with your healthcare provider as directed:  Write down your questions so you remember to ask them during your visits  © 2017 [employer]  Information is for End User's use only and may not be sold, redistributed or otherwise used for commercial purposes   All illustrations and images included in CareNotes® are the copyrighted property of [employer]  or [employer]  The above information is an  only  It is not intended as medical advice for individual conditions or treatments  Talk to your doctor, nurse or pharmacist before following any medical regimen to see if it is safe and effective for you

## 2018-07-09 NOTE — ED NOTES
Patient requesting medication for headache, asked her if she would prefer Tylenol or Motrin, patient states, 'I took that at home, it never works for me ' Dr Nissa Lamas [facility]       [place] [facility], RN  07/09/18 8527

## 2018-07-22 ENCOUNTER — HOSPITAL ENCOUNTER (EMERGENCY)
Facility: HOSPITAL | Age: 58
Discharge: HOME | End: 2018-07-22
Attending: EMERGENCY MEDICINE | Admitting: EMERGENCY MEDICINE
Payer: COMMERCIAL

## 2018-07-22 ENCOUNTER — APPOINTMENT (EMERGENCY)
Dept: CT IMAGING | Facility: HOSPITAL | Age: 58
End: 2018-07-22
Payer: COMMERCIAL

## 2018-07-22 VITALS
BODY MASS INDEX: 35.43 KG/M2 | RESPIRATION RATE: 18 BRPM | HEART RATE: 70 BPM | TEMPERATURE: 98.4 F | OXYGEN SATURATION: 96 % | SYSTOLIC BLOOD PRESSURE: 146 MMHG | WEIGHT: 233 LBS | DIASTOLIC BLOOD PRESSURE: 69 MMHG

## 2018-07-22 DIAGNOSIS — R06.00 DYSPNEA: ICD-10-CM

## 2018-07-22 DIAGNOSIS — S22.49XA MULTIPLE RIB FRACTURES: ICD-10-CM

## 2018-07-22 DIAGNOSIS — R07.89 ATYPICAL CHEST PAIN: Primary | ICD-10-CM

## 2018-07-22 LAB
ANION GAP SERPL CALCULATED.3IONS-SCNC: 8 MMOL/L (ref 4–13)
ATRIAL RATE: 86 BPM
BASOPHILS # BLD AUTO: 0.04 THOUSANDS/ΜL (ref 0–0.1)
BASOPHILS NFR BLD AUTO: 1 % (ref 0–1)
BUN SERPL-MCNC: 11 MG/DL (ref 5–25)
CALCIUM SERPL-MCNC: 8.8 MG/DL (ref 8.3–10.1)
CHLORIDE SERPL-SCNC: 108 MMOL/L (ref 100–108)
CO2 SERPL-SCNC: 29 MMOL/L (ref 21–32)
CREAT SERPL-MCNC: 0.8 MG/DL (ref 0.6–1.3)
DEPRECATED D DIMER PPP: 2088 NG/ML (FEU) (ref 0–424)
EOSINOPHIL # BLD AUTO: 0.09 THOUSAND/ΜL (ref 0–0.61)
EOSINOPHIL NFR BLD AUTO: 1 % (ref 0–6)
ERYTHROCYTE [DISTWIDTH] IN BLOOD BY AUTOMATED COUNT: 16.1 % (ref 11.6–15.1)
GFR SERPL CREATININE-BSD FRML MDRD: 81 ML/MIN/1.73SQ M
GLUCOSE SERPL-MCNC: 96 MG/DL (ref 65–140)
HCT VFR BLD AUTO: 30.3 % (ref 34.8–46.1)
HGB BLD-MCNC: 9.5 G/DL (ref 11.5–15.4)
LYMPHOCYTES # BLD AUTO: 1.67 THOUSANDS/ΜL (ref 0.6–4.47)
LYMPHOCYTES NFR BLD AUTO: 21 % (ref 14–44)
MCH RBC QN AUTO: 27.2 PG (ref 26.8–34.3)
MCHC RBC AUTO-ENTMCNC: 31.4 G/DL (ref 31.4–37.4)
MCV RBC AUTO: 87 FL (ref 82–98)
MONOCYTES # BLD AUTO: 0.87 THOUSAND/ΜL (ref 0.17–1.22)
MONOCYTES NFR BLD AUTO: 11 % (ref 4–12)
NEUTROPHILS # BLD AUTO: 5.47 THOUSANDS/ΜL (ref 1.85–7.62)
NEUTS SEG NFR BLD AUTO: 67 % (ref 43–75)
NRBC BLD AUTO-RTO: 0 /100 WBCS
P AXIS: 34 DEGREES
PLATELET # BLD AUTO: 330 THOUSANDS/UL (ref 149–390)
PMV BLD AUTO: 9.6 FL (ref 8.9–12.7)
POTASSIUM SERPL-SCNC: 3.1 MMOL/L (ref 3.5–5.3)
PR INTERVAL: 140 MS
QRS AXIS: -3 DEGREES
QRSD INTERVAL: 88 MS
QT INTERVAL: 358 MS
QTC INTERVAL: 428 MS
RBC # BLD AUTO: 3.49 MILLION/UL (ref 3.81–5.12)
SODIUM SERPL-SCNC: 145 MMOL/L (ref 136–145)
T WAVE AXIS: 32 DEGREES
TROPONIN I SERPL-MCNC: <0.02 NG/ML
TROPONIN I SERPL-MCNC: <0.02 NG/ML
VENTRICULAR RATE: 86 BPM
WBC # BLD AUTO: 8.14 THOUSAND/UL (ref 4.31–10.16)

## 2018-07-22 PROCEDURE — 80048 BASIC METABOLIC PNL TOTAL CA: CPT | Performed by: EMERGENCY MEDICINE

## 2018-07-22 PROCEDURE — 71275 CT ANGIOGRAPHY CHEST: CPT

## 2018-07-22 PROCEDURE — 93005 ELECTROCARDIOGRAM TRACING: CPT

## 2018-07-22 PROCEDURE — 85379 FIBRIN DEGRADATION QUANT: CPT | Performed by: EMERGENCY MEDICINE

## 2018-07-22 PROCEDURE — 84484 ASSAY OF TROPONIN QUANT: CPT | Performed by: EMERGENCY MEDICINE

## 2018-07-22 PROCEDURE — 96375 TX/PRO/DX INJ NEW DRUG ADDON: CPT

## 2018-07-22 PROCEDURE — 85025 COMPLETE CBC W/AUTO DIFF WBC: CPT | Performed by: EMERGENCY MEDICINE

## 2018-07-22 PROCEDURE — 36415 COLL VENOUS BLD VENIPUNCTURE: CPT | Performed by: EMERGENCY MEDICINE

## 2018-07-22 PROCEDURE — 99285 EMERGENCY DEPT VISIT HI MDM: CPT

## 2018-07-22 PROCEDURE — 96374 THER/PROPH/DIAG INJ IV PUSH: CPT

## 2018-07-22 PROCEDURE — 96361 HYDRATE IV INFUSION ADD-ON: CPT

## 2018-07-22 PROCEDURE — 93010 ELECTROCARDIOGRAM REPORT: CPT | Performed by: INTERNAL MEDICINE

## 2018-07-22 RX ORDER — POTASSIUM CHLORIDE 20 MEQ/1
40 TABLET, EXTENDED RELEASE ORAL ONCE
Status: COMPLETED | OUTPATIENT
Start: 2018-07-22 | End: 2018-07-22

## 2018-07-22 RX ORDER — ONDANSETRON 2 MG/ML
4 INJECTION INTRAMUSCULAR; INTRAVENOUS ONCE
Status: COMPLETED | OUTPATIENT
Start: 2018-07-22 | End: 2018-07-22

## 2018-07-22 RX ORDER — MORPHINE SULFATE 4 MG/ML
4 INJECTION, SOLUTION INTRAMUSCULAR; INTRAVENOUS
Status: DISCONTINUED | OUTPATIENT
Start: 2018-07-22 | End: 2018-07-22 | Stop reason: HOSPADM

## 2018-07-22 RX ADMIN — IOHEXOL 100 ML: 350 INJECTION, SOLUTION INTRAVENOUS at 19:35

## 2018-07-22 RX ADMIN — POTASSIUM CHLORIDE 40 MEQ: 1500 TABLET, EXTENDED RELEASE ORAL at 19:59

## 2018-07-22 RX ADMIN — MORPHINE SULFATE 4 MG: 4 INJECTION INTRAVENOUS at 19:40

## 2018-07-22 RX ADMIN — ONDANSETRON 4 MG: 2 INJECTION INTRAMUSCULAR; INTRAVENOUS at 19:39

## 2018-07-22 RX ADMIN — SODIUM CHLORIDE 1000 ML: 0.9 INJECTION, SOLUTION INTRAVENOUS at 19:13

## 2018-07-22 NOTE — ED NOTES
Multiple attempts at IV placement unsuccessful, Dr Serena Dunaway made aware        Mono Carranza, RN  07/22/18 0827

## 2018-07-22 NOTE — ED PROVIDER NOTES
History  Chief Complaint   Patient presents with    Chest Pain     With left sided chest pressure radiated to back also with diearrhea and nausea x1 week     59 female presents for eval of chest pain beginning this AM  Pain has been constant and worse with deep inspiration  Pain wraps around to the posterior R and some mild associated sob  Patient concerned about DVT/PE due to hx of DVT and being on eliquis  Patient recently had fall with broken ribs and wrist  States she has been laying on couch all week  Patient also w/ diarrhea this week and concerned about low K+ due to hx of same  Pain worse with inspiration, nothing makes it better  History provided by:  Patient      Prior to Admission Medications   Prescriptions Last Dose Informant Patient Reported? Taking? ARIPiprazole (ABILIFY) 5 mg tablet   No No   Sig: Take 1 tablet by mouth daily   Patient taking differently: Take 15 mg by mouth daily     LORazepam (ATIVAN) 1 mg tablet   Yes No   Sig: Take 2 mg by mouth 3 (three) times a day     apixaban (ELIQUIS) 5 mg   Yes No   Sig: Take 5 mg by mouth 2 (two) times a day   aspirin 325 mg tablet   Yes No   Sig: Take 325 mg by mouth daily  fluvoxaMINE (LUVOX) 100 mg tablet   Yes No   Sig: Take 300 mg by mouth daily at bedtime     gabapentin (NEURONTIN) 600 MG tablet   Yes No   Sig: Take 600 mg by mouth 3 (three) times a day  hydrocortisone (CORTEF) 10 mg tablet   No No   Sig: Take 1 tablet by mouth 3 (three) times a day   Patient taking differently: Take 10 mg by mouth 2 (two) times a day     lamoTRIgine (LaMICtal) 200 MG tablet   Yes No   Sig: Take 200 mg by mouth daily     magnesium 30 MG tablet   Yes No   Sig: Take 400 mg by mouth daily   meclizine (ANTIVERT) 25 mg tablet   Yes No   Sig: Take 25 mg by mouth every 8 (eight) hours     methocarbamol (ROBAXIN) 500 mg tablet   No No   Sig: Take 2 tablets by mouth 3 (three) times a day as needed for muscle spasms   Patient taking differently: Take 500 mg by mouth 4 (four) times a day     midodrine (PROAMATINE) 5 mg tablet   No No   Sig: Take 2 tablets by mouth 3 (three) times a day   ondansetron (ZOFRAN-ODT) 4 mg disintegrating tablet   Yes No   Sig: Take 4 mg by mouth every 8 (eight) hours   zonisamide (ZONEGRAN) 50 MG capsule   No No   Sig: Take 1 capsule by mouth daily   Patient taking differently: Take 125 mg by mouth daily        Facility-Administered Medications: None       Past Medical History:   Diagnosis Date    Ad[facility]Andr[facility]'s [facility])     Bipolar disorder     Cervical radiculopathy     Chronic back pain     DVT, lower extremity ([facility] [address] ) 1991    Fibromyalgia     History of TIAs     cannot remember details    Hypertension     Hypokalemia     Migraine     Psychiatric disorder     Anxiety, major depression, bipolar    Spinal stenosis     Syncope 2014    orthostatic hypotension       Past Surgical History:   Procedure Laterality Date    APPENDECTOMY      GASTRIC RESTRICTION SURGERY      Gastric Sleeve Nov 2015    HYSTERECTOMY      JOINT REPLACEMENT      Left Knee 2011 and Right Hip 2010       Family History   Problem Relation Age of Onset    Breast cancer Mother     Migraines Mother     Arthritis Mother     Kidney disease Father     Heart disease Father     Diabetes Father     Arthritis Father     Brain cancer Brother     Seizures Brother      I have reviewed and agree with the history as documented  Social History   Substance Use Topics    Smoking status: Former Smoker     Packs/day: 0 20     Types: Cigarettes     Quit date: 2008    Smokeless tobacco: Never Used    Alcohol use No        Review of Systems   Constitutional: Negative for chills and fever  Respiratory: Positive for chest tightness and shortness of breath  Cardiovascular: Positive for chest pain  Negative for palpitations and leg swelling  Gastrointestinal: Positive for diarrhea  All other systems reviewed and are negative        Physical Exam  Physical Exam   Constitutional: She is oriented to person, place, and time  She appears well-developed and well-nourished  No distress  HENT:   Head: Normocephalic and atraumatic  Right Ear: External ear normal    Left Ear: External ear normal    Eyes: Conjunctivae and EOM are normal  Pupils are equal, round, and reactive to light  No scleral icterus  Neck: Normal range of motion  Cardiovascular: Normal rate, regular rhythm and normal heart sounds  Pulmonary/Chest: Effort normal and breath sounds normal  No respiratory distress  Abdominal: Soft  Bowel sounds are normal  There is no tenderness  There is no rebound and no guarding  Musculoskeletal: Normal range of motion  She exhibits no edema  Splint on R wrist noted   Neurological: She is alert and oriented to person, place, and time  Skin: Skin is warm and dry  No rash noted  Psychiatric: She has a normal mood and affect  Nursing note and vitals reviewed        Vital Signs  ED Triage Vitals [07/22/18 1639]   Temperature Pulse Respirations Blood Pressure SpO2   98 4 °F (36 9 °C) 88 18 164/72 99 %      Temp src Heart Rate Source Patient Position - Orthostatic VS BP Location FiO2 (%)   -- Monitor Lying Left arm --      Pain Score       8           Vitals:    07/22/18 1639 07/22/18 1940   BP: 164/72 135/65   Pulse: 88 73   Patient Position - Orthostatic VS: Lying Lying       Visual Acuity      ED Medications  Medications   morphine (PF) 4 mg/mL injection 4 mg (4 mg Intravenous Given 7/22/18 1940)   sodium chloride 0 9 % bolus 1,000 mL (1,000 mL Intravenous New Bag 7/22/18 1913)   ondansetron (ZOFRAN) injection 4 mg (4 mg Intravenous Given 7/22/18 1939)   iohexol (OMNIPAQUE) 350 MG/ML injection (MULTI-DOSE) 100 mL (100 mL Intravenous Given 7/22/18 1935)   potassium chloride (K-DUR,KLOR-CON) CR tablet 40 mEq (40 mEq Oral Given 7/22/18 1959)       Diagnostic Studies  Results Reviewed     Procedure Component Value Units Date/Time    Troponin I [[id number]]  (Normal) Collected:  07/22/18 1959    Lab Status:  Final result Specimen:  Blood from Arm, Left Updated:  07/22/18 2025     Troponin I <0 02 ng/mL     D-Dimer [[id number]]  (Abnormal) Collected:  07/22/18 1806    Lab Status:  Final result Specimen:  Blood from Arm, Right Updated:  07/22/18 1824     D-Dimer, Orlando Orta 2,088 (H) ng/ml (FEU)     Troponin I [[id number]]  (Normal) Collected:  07/22/18 1709    Lab Status:  Final result Specimen:  Blood from Arm, Left Updated:  07/22/18 1731     Troponin I <0 02 ng/mL     Basic metabolic panel [[id number]]  (Abnormal) Collected:  07/22/18 1709    Lab Status:  Final result Specimen:  Blood from Arm, Left Updated:  07/22/18 1723     Sodium 145 mmol/L      Potassium 3 1 (L) mmol/L      Chloride 108 mmol/L      CO2 29 mmol/L      Anion Gap 8 mmol/L      BUN 11 mg/dL      Creatinine 0 80 mg/dL      Glucose 96 mg/dL      Calcium 8 8 mg/dL      eGFR 81 ml/min/1 73sq m     Narrative:         National Kidney Disease Education Program recommendations are as follows:  GFR calculation is accurate only with a steady state creatinine  Chronic Kidney disease less than 60 ml/min/1 73 sq  meters  Kidney failure less than 15 ml/min/1 73 sq  meters      CBC and differential [[id number]]  (Abnormal) Collected:  07/22/18 1709    Lab Status:  Final result Specimen:  Blood from Arm, Left Updated:  07/22/18 1717     WBC 8 14 Thousand/uL      RBC 3 49 (L) Million/uL      Hemoglobin 9 5 (L) g/dL      Hematocrit 30 3 (L) %      MCV 87 fL      MCH 27 2 pg      MCHC 31 4 g/dL      RDW 16 1 (H) %      MPV 9 6 fL      Platelets 375 Thousands/uL      nRBC 0 /100 WBCs      Neutrophils Relative 67 %      Lymphocytes Relative 21 %      Monocytes Relative 11 %      Eosinophils Relative 1 %      Basophils Relative 1 %      Neutrophils Absolute 5 47 Thousands/µL      Lymphocytes Absolute 1 67 Thousands/µL      Monocytes Absolute 0 87 Thousand/µL      Eosinophils Absolute 0 09 Thousand/µL      Basophils Absolute 0 04 Thousands/µL                  CTA ED chest PE study   Final Result by Vanessa Ge MD (07/22 1947)   1  Negative for pulmonary embolism  2   Status post sleeve gastrectomy  Small sliding hiatal hernia  3   Acute mildly displaced fracture lateral aspect right 10th rib  Nondisplaced fracture right 11th rib        Workstation performed: [id number]                    Procedures  ECG 12 Lead Documentation  Date/Time: 7/22/2018 4:43 PM  Performed by: Lucho Lopez  Authorized by: Juanita ARTHUR     Indications / Diagnosis:  Chest pain  ECG reviewed by me, the ED Provider: yes    Patient location:  ED  Previous ECG:     Previous ECG:  Compared to current    Comparison ECG info:  7/9/18    Similarity:  No change  Interpretation:     Interpretation: normal    Rate:     ECG rate:  86    ECG rate assessment: normal    Rhythm:     Rhythm: sinus rhythm    Ectopy:     Ectopy: none    QRS:     QRS axis:  Normal    QRS intervals:  Normal  Conduction:     Conduction: normal    ST segments:     ST segments:  Normal  T waves:     T waves: normal    Comments:      Repeat EKG @ 20:07  [email or web address] without acute ischemic changes               Phone Contacts  ECG 12 Lead Documentation  Date/Time: 7/22/2018 4:43 PM  Performed by: Lucho Lopez  Authorized by: Juanita ARTHUR     Indications / Diagnosis:  Chest pain  ECG reviewed by me, the ED Provider: yes    Patient location:  ED  Previous ECG:     Previous ECG:  Compared to current    Comparison ECG info:  7/9/18    Similarity:  No change  Interpretation:     Interpretation: normal    Rate:     ECG rate:  86    ECG rate assessment: normal    Rhythm:     Rhythm: sinus rhythm    Ectopy:     Ectopy: none    QRS:     QRS axis:  Normal    QRS intervals:  Normal  Conduction:     Conduction: normal    ST segments:     ST segments:  Normal  T waves:     T waves: normal    Comments:      Repeat EKG @ 20:07  [email or web address] [email or web address] without acute ischemic changes        ED Course MDM  Number of Diagnoses or Management Options  Atypical chest pain: new and requires workup  Dyspnea: new and requires workup  Multiple rib fractures: new and requires workup  Diagnosis management comments: Dx: chest wall pain, PE, PNA, doubt ACS    All labs reviewed and utilized in the medical decision making process    All radiology studies independently viewed by me and interpreted by the radiologist     The patient (and any family present) verbalized understanding of the discharge instructions and warnings that would necessitate return to the Emergency Department  All questions were answered prior to discharge  Amount and/or Complexity of Data Reviewed  Clinical lab tests: ordered and reviewed  Tests in the radiology section of CPT®: ordered and reviewed  Tests in the medicine section of CPT®: reviewed and ordered  Review and summarize past medical records: yes  Independent visualization of images, tracings, or specimens: yes      CritCare Time    Disposition  Final diagnoses:   Atypical chest pain   Multiple rib fractures   Dyspnea     Time reflects when diagnosis was documented in both MDM as applicable and the Disposition within this note     Time User Action Codes Description Comment    7/22/2018  8:41 PM [employer] Add [R07 89] Atypical chest pain     7/22/2018  8:41 PM [employer] Add [S22 49XA] Multiple rib fractures     7/22/2018  8:41 PM Microarrays Add [R06 00] Dyspnea       ED Disposition     ED Disposition Condition Comment    Discharge  [address] discharge to home/self care      Condition at discharge: Stable        Follow-up Information     Follow up With Specialties Details Why Wanda Harris MD Internal Medicine Schedule an appointment as soon as possible for a visit in 2 days For further evaluation, if not improved [address] [place] [address] Galeanaroyal Christopher [address] [place] [address]  [phone]            Patient's Medications Discharge Prescriptions    No medications on file     No discharge procedures on file      ED Provider  Electronically Signed by           Dellar Apley, DO  07/22/18 4942

## 2018-07-23 LAB
ATRIAL RATE: 61 BPM
P AXIS: 32 DEGREES
PR INTERVAL: 154 MS
QRS AXIS: 0 DEGREES
QRSD INTERVAL: 88 MS
QT INTERVAL: 408 MS
QTC INTERVAL: 410 MS
T WAVE AXIS: 6 DEGREES
VENTRICULAR RATE: 61 BPM

## 2018-07-23 PROCEDURE — 93010 ELECTROCARDIOGRAM REPORT: CPT | Performed by: INTERNAL MEDICINE

## 2018-07-23 NOTE — DISCHARGE INSTRUCTIONS
Chest Pain   WHAT YOU NEED TO KNOW:   Chest pain can be caused by a range of conditions, from not serious to life-threatening  Chest pain can be a symptom of a digestive problem, such as acid reflux or a stomach ulcer  An anxiety attack or a strong emotion, such as anger, can also cause chest pain  Infection, inflammation, or a fracture in the bones or cartilage in your chest can cause pain or discomfort  Sometimes chest pain or pressure is caused by poor blood flow to your heart (angina)  Chest pain may also be caused by life-threatening conditions such as a heart attack or blood clot in your lungs  DISCHARGE INSTRUCTIONS:   Call 911 if:   · You have any of the following signs of a heart attack:      ¨ Squeezing, pressure, or pain in your chest that lasts longer than 5 minutes or returns    ¨ Discomfort or pain in your back, neck, jaw, stomach, or arm     ¨ Trouble breathing    ¨ Nausea or vomiting    ¨ Lightheadedness or a sudden cold sweat, especially with chest pain or trouble breathing    Return to the emergency department if:   · You have chest discomfort that gets worse, even with medicine  · You cough or vomit blood  · Your bowel movements are black or bloody  · You cannot stop vomiting, or it hurts to swallow  Contact your healthcare provider if:   · You have questions or concerns about your condition or care  Medicines:   · Medicines  may be given to treat the cause of your chest pain  Examples include pain medicine, anxiety medicine, or medicines to increase blood flow to your heart  · Do not take certain medicines without asking your healthcare provider first   These include NSAIDs, herbal or vitamin supplements, or hormones (estrogen or progestin)  · Take your medicine as directed  Contact your healthcare provider if you think your medicine is not helping or if you have side effects  Tell him or her if you are allergic to any medicine   Keep a list of the medicines, vitamins, and herbs you take  Include the amounts, and when and why you take them  Bring the list or the pill bottles to follow-up visits  Carry your medicine list with you in case of an emergency  Follow up with your healthcare provider within 72 hours, or as directed: You may need to return for more tests to find the cause of your chest pain  You may be referred to a specialist, such as a cardiologist or gastroenterologist  Write down your questions so you remember to ask them during your visits  Healthy living tips: The following are general healthy guidelines  If your chest pain is caused by a heart problem, your healthcare provider will give you specific guidelines to follow  · Do not smoke  Nicotine and other chemicals in cigarettes and cigars can cause lung and heart damage  Ask your healthcare provider for information if you currently smoke and need help to quit  E-cigarettes or smokeless tobacco still contain nicotine  Talk to your healthcare provider before you use these products  · Eat a variety of healthy, low-fat foods  Healthy foods include fruits, vegetables, whole-grain breads, low-fat dairy products, beans, lean meats, and fish  Ask for more information about a heart healthy diet  · Ask about activity  Your healthcare provider will tell you which activities to limit or avoid  Ask when you can drive, return to work, and have sex  Ask about the best exercise plan for you  · Maintain a healthy weight  Ask your healthcare provider how much you should weigh  Ask him or her to help you create a weight loss plan if you are overweight  · Get the flu and pneumonia vaccines  All adults should get the influenza (flu) vaccine  Get it every year as soon as it becomes available  The pneumococcal vaccine is given to adults aged 72 years or older  The vaccine is given every 5 years to prevent pneumococcal disease, such as pneumonia    © 2017 [employer] Information is for End User's use only and may not be sold, redistributed or otherwise used for commercial purposes  All illustrations and images included in CareNotes® are the copyrighted property of A D A M , Inc  or Jesse [employer]  The above information is an  only  It is not intended as medical advice for individual conditions or treatments  Talk to your doctor, nurse or pharmacist before following any medical regimen to see if it is safe and effective for you  Rib Fracture   WHAT YOU NEED TO KNOW:   A rib fracture is a crack or break in a rib bone  Rib fractures usually heal within 6 weeks  You should be able to return to normal activities before that time  Do not wrap anything around your body to try to splint your ribs  This can prevent you from taking deep breaths and increases your risk for pneumonia  DISCHARGE INSTRUCTIONS:   Call 911 for any of the following:   · You have trouble breathing  · You have new or increased pain  Return to the emergency department if:   · Your pain does not get better, even after treatment  · You have a fever or a cough  Contact your healthcare provider if:   · You have questions or concerns about your condition or care  Medicines:   · NSAIDs  help decrease swelling and pain  NSAIDs are available without a doctor's order  Ask your healthcare provider which medicine is right for you  Ask how much to take and when to take it  Take as directed  NSAIDs can cause stomach bleeding and kidney problems if not taken correctly  · Prescription pain medicine  may be given  Ask your healthcare provider how to take this medicine safely  Some prescription pain medicines contain acetaminophen  Do not take other medicines that contain acetaminophen without talking to your healthcare provider  Too much acetaminophen may cause liver damage  Prescription pain medicine may cause constipation  Ask your healthcare provider how to prevent or treat constipation       · Take your medicine as directed  Contact your healthcare provider if you think your medicine is not helping or if you have side effects  Tell him or her if you are allergic to any medicine  Keep a list of the medicines, vitamins, and herbs you take  Include the amounts, and when and why you take them  Bring the list or the pill bottles to follow-up visits  Carry your medicine list with you in case of an emergency  Follow up with your healthcare provider as directed:  Write down your questions so you remember to ask them during your visits  Deep breathing:  Deep breathing will decrease your risk for pneumonia  Hug a pillow on the injured side while doing this exercise, to decrease pain  Take a deep breath and hold it for as long as possible  You should let the air out and then cough strongly  Deep breaths help open your airway  You may be given an incentive spirometer to help take deep breaths  Put the plastic piece in your mouth  Take a slow, deep breath  You should then let the air out and cough  Repeat these steps 10 times every hour  Rest:  Rest and limit activity to decrease swelling and pain, and allow your injury to heal  Avoid activities that may cause more pain or damage to your ribs such as, pulling, pushing, and lifting  As your pain decreases, begin movements slowly  Take short walks between rest periods  Ice:  Apply ice on the fractured area for 15 to 20 minutes every hour or as directed  Use an ice pack or put crushed ice in a plastic bag  Cover it with a towel  Ice helps prevent tissue damage and decreases swelling and pain  © 2017 [employer] Information is for End User's use only and may not be sold, redistributed or otherwise used for commercial purposes  All illustrations and images included in CareNotes® are the copyrighted property of [employer]  or Jesse Spain  The above information is an  only   It is not intended as medical advice for individual conditions or treatments  Talk to your doctor, nurse or pharmacist before following any medical regimen to see if it is safe and effective for you  Dyspnea   WHAT YOU NEED TO KNOW:   Dyspnea is breathing difficulty or discomfort  You may have labored, painful, or shallow breathing  You may feel breathless or short of breath  Dyspnea can occur during rest or with activity  You may have dyspnea for a short time, or it might become chronic  Dyspnea is often a symptom of a disease or condition  DISCHARGE INSTRUCTIONS:   Return to the emergency department if:   · Your signs and symptoms are the same or worse within 24 hours of treatment  · You have shaking chills or a fever over 102°F      · You have new pain, pressure, or tightness in your chest      · You have a new or worse cough or wheezing, or you cough up blood  · You feel like you cannot get enough air  · The skin over your ribs or on your neck sinks in when you breathe  · You have a severe headache with vomiting and abdominal pain  · You feel confused or dizzy  Contact your healthcare provider or specialist if:   · You have questions or concerns about your condition or care  Medicines:   · Medicines  may be used to treat the cause of your dyspnea  Medicines may reduce swelling in your airway or decrease extra fluid from around your heart or lungs  Other medicines may be used to decrease anxiety and help you feel calm and relaxed  · Take your medicine as directed  Contact your healthcare provider if you think your medicine is not helping or if you have side effects  Tell him or her if you are allergic to any medicine  Keep a list of the medicines, vitamins, and herbs you take  Include the amounts, and when and why you take them  Bring the list or the pill bottles to follow-up visits  Carry your medicine list with you in case of an emergency  Manage long-term dyspnea:   · Create an action plan    You and your healthcare provider can work together to create a plan for how to handle episodes of dyspnea  The plan can include daily activities, treatment changes, and what to do if you have severe breathing problems  · Lean forward on your elbows when you sit  This helps your lungs expand and may make it easier to breathe  · Use pursed-lip breathing any time you feel short of breath  Breathe in through your nose and then slowly breathe out through your mouth with your lips slightly puckered  It should take you twice as long to breathe out as it did to breathe in  · Do not smoke  Nicotine and other chemicals in cigarettes and cigars can cause lung damage and make it harder to breathe  Ask your healthcare provider for information if you currently smoke and need help to quit  E-cigarettes or smokeless tobacco still contain nicotine  Talk to your healthcare provider before you use these products  · Reach or maintain a healthy weight  Your healthcare provider can help you create a safe weight loss plan if you are overweight  · Exercise as directed  Exercise can help your lungs work more easily  Exercise can also help you lose weight if needed  Try to get at least 30 minutes of exercise most days of the week  Your healthcare provider can help you create an exercise plan that is safe for you  Follow up with your healthcare provider or specialist as directed:  Write down your questions so you remember to ask them during your visits  © 2017 [facility] Information is for End User's use only and may not be sold, redistributed or otherwise used for commercial purposes  All illustrations and images included in CareNotes® are the copyrighted property of A D A M , Inc  or Jesse Spain  The above information is an  only  It is not intended as medical advice for individual conditions or treatments   Talk to your doctor, nurse or pharmacist before following any medical regimen to see if it is safe and effective for you

## 2018-08-12 ENCOUNTER — APPOINTMENT (EMERGENCY)
Dept: CT IMAGING | Facility: HOSPITAL | Age: 58
End: 2018-08-12
Payer: COMMERCIAL

## 2018-08-12 ENCOUNTER — HOSPITAL ENCOUNTER (EMERGENCY)
Facility: HOSPITAL | Age: 58
Discharge: HOME | End: 2018-08-12
Attending: EMERGENCY MEDICINE | Admitting: EMERGENCY MEDICINE
Payer: COMMERCIAL

## 2018-08-12 VITALS
TEMPERATURE: 99.2 F | BODY MASS INDEX: 35.43 KG/M2 | WEIGHT: 233 LBS | SYSTOLIC BLOOD PRESSURE: 136 MMHG | DIASTOLIC BLOOD PRESSURE: 63 MMHG | OXYGEN SATURATION: 100 % | RESPIRATION RATE: 16 BRPM | HEART RATE: 83 BPM

## 2018-08-12 DIAGNOSIS — L03.115 CELLULITIS OF RIGHT LEG: ICD-10-CM

## 2018-08-12 DIAGNOSIS — S80.11XA HEMATOMA OF LEG, RIGHT, INITIAL ENCOUNTER: Primary | ICD-10-CM

## 2018-08-12 LAB
ANION GAP SERPL CALCULATED.3IONS-SCNC: 10 MMOL/L (ref 4–13)
APTT PPP: 29 SECONDS (ref 24–36)
BASOPHILS # BLD AUTO: 0.04 THOUSANDS/ΜL (ref 0–0.1)
BASOPHILS NFR BLD AUTO: 1 % (ref 0–1)
BUN SERPL-MCNC: 10 MG/DL (ref 5–25)
CALCIUM SERPL-MCNC: 9 MG/DL (ref 8.3–10.1)
CHLORIDE SERPL-SCNC: 105 MMOL/L (ref 100–108)
CO2 SERPL-SCNC: 26 MMOL/L (ref 21–32)
CREAT SERPL-MCNC: 0.93 MG/DL (ref 0.6–1.3)
EOSINOPHIL # BLD AUTO: 0.3 THOUSAND/ΜL (ref 0–0.61)
EOSINOPHIL NFR BLD AUTO: 4 % (ref 0–6)
ERYTHROCYTE [DISTWIDTH] IN BLOOD BY AUTOMATED COUNT: 15.7 % (ref 11.6–15.1)
GFR SERPL CREATININE-BSD FRML MDRD: 67 ML/MIN/1.73SQ M
GLUCOSE SERPL-MCNC: 96 MG/DL (ref 65–140)
HCT VFR BLD AUTO: 32.7 % (ref 34.8–46.1)
HGB BLD-MCNC: 10.1 G/DL (ref 11.5–15.4)
INR PPP: 1.12 (ref 0.86–1.17)
LACTATE SERPL-SCNC: 1.2 MMOL/L (ref 0.5–2)
LYMPHOCYTES # BLD AUTO: 1.81 THOUSANDS/ΜL (ref 0.6–4.47)
LYMPHOCYTES NFR BLD AUTO: 22 % (ref 14–44)
MCH RBC QN AUTO: 26.4 PG (ref 26.8–34.3)
MCHC RBC AUTO-ENTMCNC: 30.9 G/DL (ref 31.4–37.4)
MCV RBC AUTO: 86 FL (ref 82–98)
MONOCYTES # BLD AUTO: 0.82 THOUSAND/ΜL (ref 0.17–1.22)
MONOCYTES NFR BLD AUTO: 10 % (ref 4–12)
NEUTROPHILS # BLD AUTO: 5.22 THOUSANDS/ΜL (ref 1.85–7.62)
NEUTS SEG NFR BLD AUTO: 64 % (ref 43–75)
NRBC BLD AUTO-RTO: 0 /100 WBCS
PLATELET # BLD AUTO: 312 THOUSANDS/UL (ref 149–390)
PMV BLD AUTO: 9.4 FL (ref 8.9–12.7)
POTASSIUM SERPL-SCNC: 3.1 MMOL/L (ref 3.5–5.3)
PROTHROMBIN TIME: 14.5 SECONDS (ref 11.8–14.2)
RBC # BLD AUTO: 3.82 MILLION/UL (ref 3.81–5.12)
SODIUM SERPL-SCNC: 141 MMOL/L (ref 136–145)
WBC # BLD AUTO: 8.19 THOUSAND/UL (ref 4.31–10.16)

## 2018-08-12 PROCEDURE — 99284 EMERGENCY DEPT VISIT MOD MDM: CPT

## 2018-08-12 PROCEDURE — 85025 COMPLETE CBC W/AUTO DIFF WBC: CPT | Performed by: PHYSICIAN ASSISTANT

## 2018-08-12 PROCEDURE — 73701 CT LOWER EXTREMITY W/DYE: CPT

## 2018-08-12 PROCEDURE — 85730 THROMBOPLASTIN TIME PARTIAL: CPT | Performed by: PHYSICIAN ASSISTANT

## 2018-08-12 PROCEDURE — 85610 PROTHROMBIN TIME: CPT | Performed by: PHYSICIAN ASSISTANT

## 2018-08-12 PROCEDURE — 83605 ASSAY OF LACTIC ACID: CPT | Performed by: PHYSICIAN ASSISTANT

## 2018-08-12 PROCEDURE — 36415 COLL VENOUS BLD VENIPUNCTURE: CPT | Performed by: PHYSICIAN ASSISTANT

## 2018-08-12 PROCEDURE — 80048 BASIC METABOLIC PNL TOTAL CA: CPT | Performed by: PHYSICIAN ASSISTANT

## 2018-08-12 RX ORDER — ACETAMINOPHEN 325 MG/1
650 TABLET ORAL ONCE AS NEEDED
Status: COMPLETED | OUTPATIENT
Start: 2018-08-12 | End: 2018-08-12

## 2018-08-12 RX ORDER — SULFAMETHOXAZOLE AND TRIMETHOPRIM 800; 160 MG/1; MG/1
1 TABLET ORAL 2 TIMES DAILY
Qty: 20 TABLET | Refills: 0 | Status: SHIPPED | OUTPATIENT
Start: 2018-08-12 | End: 2018-08-22

## 2018-08-12 RX ORDER — POTASSIUM CHLORIDE 20 MEQ/1
20 TABLET, EXTENDED RELEASE ORAL ONCE
Status: COMPLETED | OUTPATIENT
Start: 2018-08-12 | End: 2018-08-12

## 2018-08-12 RX ORDER — HYDROCODONE BITARTRATE AND ACETAMINOPHEN 5; 325 MG/1; MG/1
1 TABLET ORAL ONCE
Status: COMPLETED | OUTPATIENT
Start: 2018-08-12 | End: 2018-08-12

## 2018-08-12 RX ORDER — HYDROCODONE BITARTRATE AND ACETAMINOPHEN 5; 325 MG/1; MG/1
1 TABLET ORAL EVERY 6 HOURS PRN
Qty: 6 TABLET | Refills: 0 | Status: SHIPPED | OUTPATIENT
Start: 2018-08-12 | End: 2018-08-22

## 2018-08-12 RX ORDER — CEPHALEXIN 500 MG/1
500 CAPSULE ORAL 4 TIMES DAILY
Qty: 40 CAPSULE | Refills: 0 | Status: SHIPPED | OUTPATIENT
Start: 2018-08-12 | End: 2018-08-22

## 2018-08-12 RX ADMIN — IOHEXOL 100 ML: 350 INJECTION, SOLUTION INTRAVENOUS at 08:57

## 2018-08-12 RX ADMIN — POTASSIUM CHLORIDE 20 MEQ: 1500 TABLET, EXTENDED RELEASE ORAL at 09:11

## 2018-08-12 RX ADMIN — HYDROCODONE BITARTRATE AND ACETAMINOPHEN 1 TABLET: 5; 325 TABLET ORAL at 07:56

## 2018-08-12 RX ADMIN — ACETAMINOPHEN 650 MG: 325 TABLET, FILM COATED ORAL at 10:30

## 2018-08-12 NOTE — DISCHARGE INSTRUCTIONS
FU with ortho - DR Lilo Sheffield this week  Watch for sign of worsening infection - spread of redness to the leg- return to the ED if worse or with fevers  Cellulitis, Ambulatory Care   GENERAL INFORMATION:   Cellulitis  is a skin infection caused by bacteria  Common symptoms include the following:   · Fever    · A red, warm, swollen area on your skin    · Pain when the area is touched    · Bumps or blisters (abscess) that may drain pus    · Bumpy, raised skin that feels like an orange peel  Seek immediate care for the following symptoms:   · An increase in pain, redness, warmth, and size    · Red streaks coming from the infected area    · A thin, gray-brown discharge coming from your infected skin area    · A crackling under your skin when you touch it    · Purple dots or bumps on your skin, or bleeding under your skin    · New swelling and pain in your legs    · Sudden trouble breathing or chest pain  Treatment for cellulitis  may include medicines to treat the bacterial infection or decrease pain  The infection may need to be cleaned out  Damaged, dead, or infected tissue may need to be cut away to help your wound heal   Manage your symptoms:   · Elevate your wound above the level of your heart  as often as you can  This will help decrease swelling and pain  Prop your wound on pillows or blankets to keep it elevated comfortably  · Clean your wound as directed  You may need to wash the wound with soap and water  Look for signs of infection  · Wear pressure stockings as directed  The stockings are tight and put pressure on your legs  This improves blood flow and decreases swelling  Prevent cellulitis:   · Wash your hands often  Use soap and water  Wash your hands after you use the bathroom, change a child's diapers, or sneeze  Wash your hands before you prepare or eat food  Use lotion to prevent dry, cracked skin  · Do not share personal items, such as towels, clothing, and razors       · Clean exercise equipment  with germ-killing  before and after you use it  Follow up with your healthcare provider as directed:  Write down your questions so you remember to ask them during your visits  CARE AGREEMENT:   You have the right to help plan your care  Learn about your health condition and how it may be treated  Discuss treatment options with your caregivers to decide what care you want to receive  You always have the right to refuse treatment  The above information is an  only  It is not intended as medical advice for individual conditions or treatments  Talk to your doctor, nurse or pharmacist before following any medical regimen to see if it is safe and effective for you  © 2014 [address] is for End User's use only and may not be sold, redistributed or otherwise used for commercial purposes  All illustrations and images included in CareNotes® are the copyrighted property of [employer] M [employer]  or [employer]  Contusion in Adults   AMBULATORY CARE:   A contusion  is a bruise that appears on your skin after an injury  A bruise happens when small blood vessels tear but skin does not  When blood vessels tear, blood leaks into nearby tissue, such as soft tissue or muscle  Other signs and symptoms you may have with a contusion:   · Pain that increases when you touch the bruise, walk, or use the area around the bruise    · Swelling or a lump at the site of the bruise or near it    · Red, blue, or black skin that may change to green or yellow after a few days           · Stiffness or problems moving the bruised area of your body  Seek care immediately if:   · You have new trouble moving the injured area  · You have tingling or numbness in or near the injured area  · Your hand or foot below the bruise gets cold or turns pale  Contact your healthcare provider if:   · You find a new lump in the injured area      · Your symptoms do not improve with treatment after 4 to 5 days  · You have questions or concerns about your condition or care  Treatment for a contusion  may include any of the following:  · NSAIDs , such as ibuprofen, help decrease swelling, pain, and fever  This medicine is available with or without a doctor's order  NSAIDs can cause stomach bleeding or kidney problems in certain people  If you take blood thinner medicine, always ask your healthcare provider if NSAIDs are safe for you  Always read the medicine label and follow directions  · Prescription pain medicine  may be given  Do not wait until the pain is severe before you take your medicine  · Aspiration  is a procedure used to drain pooled blood in your muscle  This may help prevent increased pressure in the muscle  · Surgery  may be done to repair a tear in the muscle or relieve pressure in the muscle caused by swelling  Help a contusion heal:   · Rest the injured area  or use it less than usual  If you bruised your leg or foot, you may need crutches or a cane to help you walk  This will help you keep weight off your injured body part  · Apply ice  to decrease swelling and pain  Ice may also help prevent tissue damage  Use an ice pack, or put crushed ice in a plastic bag  Cover it with a towel and place it on your bruise for 15 to 20 minutes every hour or as directed  · Use compression  to support the area and decrease swelling  Wrap an elastic bandage around the area over the bruised muscle  Make sure the bandage is not too tight  You should be able to fit 1 finger between the bandage and your skin  · Elevate (raise) your injured body part  above the level of your heart to help decrease pain and swelling  Use pillows, blankets, or rolled towels to elevate the area as often as you can  · Do not drink alcohol  as directed  Alcohol may slow healing  · Do not stretch injured muscles  right after your injury   Ask your healthcare provider when and how you may safely stretch after your injury  Gentle stretches can help increase your flexibility  · Do not massage the area or put heating pads  on the bruise right after your injury  Heat and massage may slow healing  Your healthcare provider may tell you to apply heat after several days  At that time, heat will start to help the injury heal   Follow up with your healthcare provider as directed:  Write down your questions so you remember to ask them during your visits  © 2017 [address] Information is for End User's use only and may not be sold, redistributed or otherwise used for commercial purposes  All illustrations and images included in CareNotes® are the copyrighted property of A D A M , Inc  or [employer]  The above information is an  only  It is not intended as medical advice for individual conditions or treatments  Talk to your doctor, nurse or pharmacist before following any medical regimen to see if it is safe and effective for you  Knee Pain   WHAT YOU NEED TO KNOW:   Knee pain may start suddenly, or it may be a long-term problem  You may have pain on the side, front, or back of your knee  You may have knee stiffness and swelling  You may hear popping sounds or feel like your knee is giving way or locking up as you walk  You may feel pain when you sit, stand, walk, or climb up and down stairs  Knee pain can be caused by conditions such as obesity, inflammation, or strains or tears in ligaments or tendons  DISCHARGE INSTRUCTIONS:   Follow up with your healthcare provider within 24 hours or as directed: You may need follow-up treatments, such as steroid injections to decrease pain  Write down your questions so you remember to ask them during your visits  Self-care:   · Rest  your knee so it can heal  Limit activities that increase your pain  · Ice  can help reduce swelling   Wrap ice in a towel and put it on your knee for as long and as often as directed  · Compression  with a brace or bandage can help reduce swelling  Use a brace or bandage only as directed  · Elevation  helps decrease pain and swelling  Elevate your knee while you are sitting or lying down  Prop your leg on pillows to keep your knee above the level of your heart  Medicines:   · NSAIDs  help decrease swelling and pain or fever  This medicine is available with or without a doctor's order  NSAIDs can cause stomach bleeding or kidney problems in certain people  If you take blood thinner medicine, always ask your healthcare provider if NSAIDs are safe for you  Always read the medicine label and follow directions  · Acetaminophen  decreases pain and fever  It is available without a doctor's order  Ask how much to take and when to take it  Follow directions  Acetaminophen can cause liver damage if not taken correctly  · Take your medicine as directed  Contact your healthcare provider if you think your medicine is not helping or if you have side effects  Tell him or her if you are allergic to any medicine  Keep a list of the medicines, vitamins, and herbs you take  Include the amounts, and when and why you take them  Bring the list or the pill bottles to follow-up visits  Carry your medicine list with you in case of an emergency  Exercise as directed: You may need to see a physical therapist or do recommended exercises to improve movement and decrease your pain  You may be directed to walk, swim, or ride a bike  Follow your exercise plan exactly as directed to avoid further injury  Contact your healthcare provider if:   · You have questions or concerns about your condition or care  Return to the emergency department if:   · Your pain is worse, even after treatment  · You cannot bend or straighten your leg completely  · The swelling around your knee does not go down even with treatment  · Your knee is painful and hot to the touch    © 2017 [employer] Information is for End User's use only and may not be sold, redistributed or otherwise used for commercial purposes  All illustrations and images included in CareNotes® are the copyrighted property of A D A M , Inc  or [employer]  The above information is an  only  It is not intended as medical advice for individual conditions or treatments  Talk to your doctor, nurse or pharmacist before following any medical regimen to see if it is safe and effective for you

## 2018-08-12 NOTE — ED PROVIDER NOTES
History  Chief Complaint   Patient presents with    Leg Pain     Right leg pain started 2 days ago  Recent other injuries from deck falling about 2 weeks ago  Patient presents to the emergency department with right upper leg and right-sided knee pain  Patient states it started a couple of days ago  She reports that 3 weeks ago she was at her daughter's and they reports collapse and she fell about 12 feet  She fractured her right wrist   She reports that she was seen at [facility] [place] for trauma after the incident  She states that she was not having a lot of leg pain at time  But suddenly has developed pain  Upon arrival here and taking off her pants she noticed some redness to her right lateral knee  She notes that there is a lump above her knee she has had this since the fall but it is now becoming painful  And she has developed swelling around the area  On Eliquis  Patient denies any fevers or chills  Patient states she is able to walk on it but has not been able to walk upstairs because of pain for the past 3  She is able to bend her knee range of motion is mildly reduced but she is able to flex it  Pt had fracture right ribs and right wrist from the fall - had been on percocet for this and postoperatively from her wrist surgery - later admits she ran out 4 days ago and is now just taking tylenol  Prior to Admission Medications   Prescriptions Last Dose Informant Patient Reported? Taking? ARIPiprazole (ABILIFY) 5 mg tablet   No Yes   Sig: Take 1 tablet by mouth daily   Patient taking differently: Take 15 mg by mouth daily     LORazepam (ATIVAN) 1 mg tablet   Yes Yes   Sig: Take 2 mg by mouth 3 (three) times a day     apixaban (ELIQUIS) 5 mg   Yes Yes   Sig: Take 5 mg by mouth 2 (two) times a day   aspirin 325 mg tablet   Yes Yes   Sig: Take 325 mg by mouth daily     fluvoxaMINE (LUVOX) 100 mg tablet   Yes Yes   Sig: Take 300 mg by mouth daily at bedtime     gabapentin (NEURONTIN) 600 MG tablet   Yes Yes   Sig: Take 600 mg by mouth 3 (three) times a day  hydrocortisone (CORTEF) 10 mg tablet   No Yes   Sig: Take 1 tablet by mouth 3 (three) times a day   Patient taking differently: Take 10 mg by mouth 2 (two) times a day     lamoTRIgine (LaMICtal) 200 MG tablet   Yes Yes   Sig: Take 200 mg by mouth daily     magnesium 30 MG tablet   Yes Yes   Sig: Take 400 mg by mouth daily   meclizine (ANTIVERT) 25 mg tablet   Yes Yes   Sig: Take 25 mg by mouth every 8 (eight) hours     methocarbamol (ROBAXIN) 500 mg tablet   No Yes   Sig: Take 2 tablets by mouth 3 (three) times a day as needed for muscle spasms   Patient taking differently: Take 500 mg by mouth 4 (four) times a day     midodrine (PROAMATINE) 5 mg tablet   No Yes   Sig: Take 2 tablets by mouth 3 (three) times a day   ondansetron (ZOFRAN-ODT) 4 mg disintegrating tablet   Yes Yes   Sig: Take 4 mg by mouth every 8 (eight) hours   zonisamide (ZONEGRAN) 50 MG capsule   No Yes   Sig: Take 1 capsule by mouth daily   Patient taking differently: Take 125 mg by mouth daily        Facility-Administered Medications: None       Past Medical History:   Diagnosis Date    Adrenal insufficiency (Neal's disease) (HCC)     Bipolar disorder     Cervical radiculopathy     Chronic back pain     DVT, lower extremity (HCC) 1991    Fibromyalgia     History of TIAs     cannot remember details    Hypertension     Hypokalemia     Migraine     Psychiatric disorder     Anxiety, major depression, bipolar    Spinal stenosis     Syncope 2014    orthostatic hypotension       Past Surgical History:   Procedure Laterality Date    APPENDECTOMY      GASTRIC RESTRICTION SURGERY      Gastric Sleeve Nov 2015    HYSTERECTOMY      JOINT REPLACEMENT      Left Knee 2011 and Right Hip 2010       Family History   Problem Relation Age of Onset    Breast cancer Mother     Migraines Mother     Arthritis Mother     Kidney disease Father     Heart disease Father    Padmaja Major Diabetes Father     Arthritis Father     Brain cancer Brother     Seizures Brother      I have reviewed and agree with the history as documented  Social History   Substance Use Topics    Smoking status: Former Smoker     Packs/day: 0 20     Types: Cigarettes     Quit date: 2008    Smokeless tobacco: Never Used    Alcohol use No        Review of Systems   All other systems reviewed and are negative  Physical Exam  Physical Exam   Constitutional: She appears well-developed and well-nourished  HENT:   Head: Normocephalic  Mouth/Throat: Oropharynx is clear and moist    Eyes: Conjunctivae are normal    Neck: Normal range of motion  Cardiovascular: Normal rate, regular rhythm and normal heart sounds  Pulmonary/Chest: Effort normal and breath sounds normal    Abdominal: Soft  Bowel sounds are normal    Musculoskeletal:        Right knee: She exhibits swelling  She exhibits no effusion and no laceration  Tenderness found  Legs:  Patient able to flex her knee to about 45° without significant discomfort  Neurological: She is alert  Skin: Skin is warm  Psychiatric: She has a normal mood and affect  Her behavior is normal    Nursing note and vitals reviewed        Vital Signs  ED Triage Vitals   Temperature Pulse Respirations Blood Pressure SpO2   08/12/18 0724 08/12/18 0724 08/12/18 0724 08/12/18 0724 08/12/18 0724   99 2 °F (37 3 °C) 102 18 130/60 96 %      Temp Source Heart Rate Source Patient Position - Orthostatic VS BP Location FiO2 (%)   08/12/18 0724 08/12/18 1018 08/12/18 1018 08/12/18 1018 --   Oral Monitor Lying Left arm       Pain Score       08/12/18 0724       8           Vitals:    08/12/18 0724 08/12/18 1018   BP: 130/60 136/63   Pulse: 102 83   Patient Position - Orthostatic VS:  Lying       Visual Acuity  Visual Acuity      Most Recent Value   L Pupil Size (mm)  3   R Pupil Size (mm)  3          ED Medications  Medications   HYDROcodone-acetaminophen (NORCO) 5-325 mg per tablet 1 tablet (1 tablet Oral Given 8/12/18 0756)   iohexol (OMNIPAQUE) 350 MG/ML injection (SINGLE-DOSE) 100 mL (100 mL Intravenous Given 8/12/18 0857)   potassium chloride (K-DUR,KLOR-CON) CR tablet 20 mEq (20 mEq Oral Given 8/12/18 0911)       Diagnostic Studies  Results Reviewed     Procedure Component Value Units Date/Time    Lactic acid, plasma [[id number]]  (Normal) Collected:  08/12/18 0823    Lab Status:  Final result Specimen:  Blood from Arm, Right Updated:  08/12/18 0849     LACTIC ACID 1 2 mmol/L     Narrative:         Result may be elevated if tourniquet was used during collection  Protime-INR [[id number]]  (Abnormal) Collected:  08/12/18 0824    Lab Status:  Final result Specimen:  Blood from Arm, Right Updated:  08/12/18 0843     Protime 14 5 (H) seconds      INR 1 12    APTT [[id number]]  (Normal) Collected:  08/12/18 0824    Lab Status:  Final result Specimen:  Blood from Arm, Right Updated:  08/12/18 0843     PTT 29 seconds     Basic metabolic panel [[id number]]  (Abnormal) Collected:  08/12/18 0823    Lab Status:  Final result Specimen:  Blood from Arm, Right Updated:  08/12/18 0840     Sodium 141 mmol/L      Potassium 3 1 (L) mmol/L      Chloride 105 mmol/L      CO2 26 mmol/L      Anion Gap 10 mmol/L      BUN 10 mg/dL      Creatinine 0 93 mg/dL      Glucose 96 mg/dL      Calcium 9 0 mg/dL      eGFR 67 ml/min/1 73sq m     Narrative:         National Kidney Disease Education Program recommendations are as follows:  GFR calculation is accurate only with a steady state creatinine  Chronic Kidney disease less than 60 ml/min/1 73 sq  meters  Kidney failure less than 15 ml/min/1 73 sq  meters      CBC and differential [[id number]]  (Abnormal) Collected:  08/12/18 0823    Lab Status:  Final result Specimen:  Blood from Arm, Right Updated:  08/12/18 0831     WBC 8 19 Thousand/uL      RBC 3 82 Million/uL      Hemoglobin 10 1 (L) g/dL      Hematocrit 32 7 (L) %      MCV 86 fL      MCH 26 4 (L) pg      MCHC 30 9 (L) g/dL      RDW 15 7 (H) %      MPV 9 4 fL      Platelets 047 Thousands/uL      nRBC 0 /100 WBCs      Neutrophils Relative 64 %      Lymphocytes Relative 22 %      Monocytes Relative 10 %      Eosinophils Relative 4 %      Basophils Relative 1 %      Neutrophils Absolute 5 22 Thousands/µL      Lymphocytes Absolute 1 81 Thousands/µL      Monocytes Absolute 0 82 Thousand/µL      Eosinophils Absolute 0 30 Thousand/µL      Basophils Absolute 0 04 Thousands/µL                  CT femur right w contrast   Final Result by Ricardo Cadena MD (08/12 1091)      1  Prior total right hip arthroplasty without evidence of prosthesis loosening, periprosthetic osteolysis or other complication  2   Intact right femur  3   2 x 2 5 x 4 cm thin-walled fluid collection in the anterior subcutaneous fat of the distal right thigh as described above  This could be a chronic hematoma or, possibly, abscess  Overlying skin thickening  Workstation performed: [id number]                    Procedures  Procedures       Phone Contacts  ED Phone Contact    ED Course  ED Course as of Aug 12 1020   Sun Aug 12, 2018   0919 Mild anemia is chronic  Patient has been hypokalemic in the past will also medicate for this  0658 Paged dr Rose Lemon with DR Carine Melendez - he reviewed films - he also suspects hematoma  Will ace wrap and have pt FU in office                                MDM  Number of Diagnoses or Management Options  Cellulitis of right leg: new and requires workup  Hematoma of leg, right, initial encounter: new and requires workup  Diagnosis management comments: [facility] records reviewed- she was last given a 7 day supply of oxycodone on 7/31           Amount and/or Complexity of Data Reviewed  Clinical lab tests: reviewed  Tests in the radiology section of CPT®: reviewed  Decide to obtain previous medical records or to obtain history from someone other than the patient: yes  Discuss the patient with other providers: yes (Haider and DR Eduardo Wilkins)  Independent visualization of images, tracings, or specimens: yes    Risk of Complications, Morbidity, and/or Mortality  General comments: [facility] records reviewed- she was last given a 7 day supply of oxycodone on 7/31   - pt asking for something stronger than tylenol  Discussed importance of FU with DR Eduardo Wilkins and reasons to return to the ED  Discussed plan with pt she has a walker at home feels comfortable going home/safe at home and agrees to FU  Patient Progress  Patient progress: improved    CritCare Time    Disposition  Final diagnoses:   Hematoma of leg, right, initial encounter   Cellulitis of right leg     Time reflects when diagnosis was documented in both MDM as applicable and the Disposition within this note     Time User Action Codes Description Comment    8/12/2018 10:14 AM Rachel Caballero [[id number]] Hematoma of leg, right, initial encounter     8/12/2018 10:14 AM Rachel Caballero [[id number]] Cellulitis of right leg       ED Disposition     ED Disposition Condition Comment    Discharge  [address] discharge to home/self care      Condition at discharge: Good        Follow-up Information     Follow up With Specialties Details Why [address], MD Orthopedic Surgery   [address] [place] [address]            Patient's Medications   Discharge Prescriptions    CEPHALEXIN (KEFLEX) 500 MG CAPSULE    Take 1 capsule (500 mg total) by mouth 4 (four) times a day for 10 days       Start Date: 8/12/2018 End Date: 8/22/2018       Order Dose: 500 mg       Quantity: 40 capsule    Refills: 0    HYDROCODONE-ACETAMINOPHEN (NORCO) 5-325 MG PER TABLET    Take 1 tablet by mouth every 6 (six) hours as needed for pain for up to 10 days Max Daily Amount: 4 tablets       Start Date: 8/12/2018 End Date: 8/22/2018       Order Dose: 1 tablet       Quantity: 6 tablet    Refills: 0    SULFAMETHOXAZOLE-TRIMETHOPRIM (BACTRIM DS) 800-160 MG PER TABLET    Take 1 tablet by mouth 2 (two) times a day for 10 days       Start Date: 8/12/2018 End Date: 8/22/2018       Order Dose: 1 tablet       Quantity: 20 tablet    Refills: 0     No discharge procedures on file      ED Provider  Electronically Signed by           Orlando Barbosa PA-C  08/12/18 1017       Rachel Caballero PA-C  08/12/18 1020

## 2018-08-12 NOTE — ED NOTES
Pt states that she has difficulty going upstairs d/t pain right leg  Has hx of dvt and is on Kailyn Vargas RN  08/12/18 3403

## 2018-08-14 ENCOUNTER — HOSPITAL ENCOUNTER (EMERGENCY)
Facility: HOSPITAL | Age: 58
Discharge: HOME | End: 2018-08-14
Attending: EMERGENCY MEDICINE
Payer: COMMERCIAL

## 2018-08-14 VITALS
HEART RATE: 101 BPM | WEIGHT: 235 LBS | BODY MASS INDEX: 35.73 KG/M2 | OXYGEN SATURATION: 99 % | TEMPERATURE: 98.9 F | DIASTOLIC BLOOD PRESSURE: 77 MMHG | RESPIRATION RATE: 18 BRPM | SYSTOLIC BLOOD PRESSURE: 173 MMHG

## 2018-08-14 DIAGNOSIS — M25.561 ACUTE PAIN OF RIGHT KNEE: Primary | ICD-10-CM

## 2018-08-14 PROCEDURE — 99283 EMERGENCY DEPT VISIT LOW MDM: CPT

## 2018-08-14 RX ORDER — LIDOCAINE 40 MG/G
CREAM TOPICAL AS NEEDED
Qty: 30 G | Refills: 0 | Status: SHIPPED | OUTPATIENT
Start: 2018-08-14 | End: 2019-04-05 | Stop reason: ALTCHOICE

## 2018-08-14 RX ORDER — IBUPROFEN 600 MG/1
600 TABLET ORAL ONCE
Status: COMPLETED | OUTPATIENT
Start: 2018-08-14 | End: 2018-08-14

## 2018-08-14 RX ORDER — LIDOCAINE 50 MG/G
1 PATCH TOPICAL ONCE
Status: DISCONTINUED | OUTPATIENT
Start: 2018-08-14 | End: 2018-08-14 | Stop reason: HOSPADM

## 2018-08-14 RX ADMIN — IBUPROFEN 600 MG: 600 TABLET, FILM COATED ORAL at 17:44

## 2018-08-14 RX ADMIN — LIDOCAINE 1 PATCH: 50 PATCH TOPICAL at 17:44

## 2018-08-14 NOTE — ED PROVIDER NOTES
History  Chief Complaint   Patient presents with    Knee Pain     Patient presents complaining of right knee redness/tenderness  Seen this past Sunday in ED, diagnosed with cellulitis, taking ABX as prescribed however reports increased pain and states "the redness is moving up my leg"  58 YO female returns for continued pain in the Right knee  Pt had a fall two weeks prior, states only mild pain at that time but with continuing swelling in the Right knee since the incidence, some bruising to the area  Pt states the pain is dull, aching, radiates up into the leg, it has been constant and worse with flexion  Pt denies weakness or numbness in the extremity  She was seen two days prior for same, found to have a hematoma  She was prescribed Keflex which she has been taking  Orthopedics was consulted at that time and pt is to follow up in the office though she has not called to make an appointment  Pt returns as she has worsening swelling at the knee  She denies fevers or chills  Pt denies CP/SOB/F/C/N/V/D/C, no dysuria, burning on urination or blood in urine  History provided by:  Patient   used: No    Knee Pain   Location:  Knee  Injury: yes    Knee location:  R knee  Pain details:     Quality:  Aching    Radiates to:  R leg    Severity:  Moderate    Onset quality:  Gradual    Timing:  Constant    Progression:  Worsening  Chronicity:  New  Prior injury to area:  Yes  Relieved by:  Nothing  Worsened by:  Bearing weight and flexion  Associated symptoms: decreased ROM and swelling    Associated symptoms: no fatigue, no fever, no muscle weakness, no numbness and no stiffness    Swelling:     Location:  Leg    Onset quality:  Gradual    Duration:  2 weeks    Timing:  Constant    Progression:  Unchanged    Chronicity:  New      Prior to Admission Medications   Prescriptions Last Dose Informant Patient Reported? Taking?    ARIPiprazole (ABILIFY) 5 mg tablet   No No   Sig: Take 1 tablet by mouth daily   Patient taking differently: Take 15 mg by mouth daily     HYDROcodone-acetaminophen (NORCO) 5-325 mg per tablet   No No   Sig: Take 1 tablet by mouth every 6 (six) hours as needed for pain for up to 10 days Max Daily Amount: 4 tablets   LORazepam (ATIVAN) 1 mg tablet   Yes No   Sig: Take 2 mg by mouth 3 (three) times a day     apixaban (ELIQUIS) 5 mg   Yes No   Sig: Take 5 mg by mouth 2 (two) times a day   aspirin 325 mg tablet   Yes No   Sig: Take 325 mg by mouth daily  cephalexin (KEFLEX) 500 mg capsule   No No   Sig: Take 1 capsule (500 mg total) by mouth 4 (four) times a day for 10 days   fluvoxaMINE (LUVOX) 100 mg tablet   Yes No   Sig: Take 300 mg by mouth daily at bedtime     gabapentin (NEURONTIN) 600 MG tablet   Yes No   Sig: Take 600 mg by mouth 3 (three) times a day  hydrocortisone (CORTEF) 10 mg tablet   No No   Sig: Take 1 tablet by mouth 3 (three) times a day   Patient taking differently: Take 10 mg by mouth 2 (two) times a day     lamoTRIgine (LaMICtal) 200 MG tablet   Yes No   Sig: Take 200 mg by mouth daily     magnesium 30 MG tablet   Yes No   Sig: Take 400 mg by mouth daily   meclizine (ANTIVERT) 25 mg tablet   Yes No   Sig: Take 25 mg by mouth every 8 (eight) hours     methocarbamol (ROBAXIN) 500 mg tablet   No No   Sig: Take 2 tablets by mouth 3 (three) times a day as needed for muscle spasms   Patient taking differently: Take 500 mg by mouth 4 (four) times a day     midodrine (PROAMATINE) 5 mg tablet   No No   Sig: Take 2 tablets by mouth 3 (three) times a day   ondansetron (ZOFRAN-ODT) 4 mg disintegrating tablet   Yes No   Sig: Take 4 mg by mouth every 8 (eight) hours   sulfamethoxazole-trimethoprim (BACTRIM DS) 800-160 mg per tablet   No No   Sig: Take 1 tablet by mouth 2 (two) times a day for 10 days   zonisamide (ZONEGRAN) 50 MG capsule   No No   Sig: Take 1 capsule by mouth daily   Patient taking differently: Take 125 mg by mouth daily        Facility-Administered Medications: None       Past Medical History:   Diagnosis Date    Adrenal insufficiency (Andrés's disease) ([facility] [address] [facility]     Bipolar disorder     Cervical radiculopathy     Chronic back pain     [facility]    Fibromyalgia     History of TIAs     cannot remember details    Hypertension     Hypokalemia     Migraine     Psychiatric disorder     Anxiety, major depression, bipolar    Spinal stenosis     Syncope 2014    orthostatic hypotension       Past Surgical History:   Procedure Laterality Date    APPENDECTOMY      GASTRIC RESTRICTION SURGERY      Gastric Sleeve Nov 2015    HYSTERECTOMY      JOINT REPLACEMENT      Left Knee 2011 and Right Hip 2010       Family History   Problem Relation Age of Onset    Breast cancer Mother     Migraines Mother     Arthritis Mother     Kidney disease Father     Heart disease Father     Diabetes Father     Arthritis Father     Brain cancer Brother     Seizures Brother      I have reviewed and agree with the history as documented  Social History   Substance Use Topics    Smoking status: Former Smoker     Packs/day: 0 20     Types: Cigarettes     Quit date: 2008    Smokeless tobacco: Never Used    Alcohol use No        Review of Systems   Constitutional: Negative for chills, fatigue and fever  HENT: Negative for dental problem  Eyes: Negative for visual disturbance  Respiratory: Negative for shortness of breath  Cardiovascular: Negative for chest pain  Gastrointestinal: Negative for abdominal pain, diarrhea and vomiting  Genitourinary: Negative for dysuria and frequency  Musculoskeletal: Negative for arthralgias and stiffness  Skin: Negative for rash  Neurological: Negative for dizziness, weakness and light-headedness  Psychiatric/Behavioral: Negative for agitation, behavioral problems and confusion  All other systems reviewed and are negative        Physical Exam  Physical Exam   Constitutional: She is oriented to person, place, and time  She appears well-developed and well-nourished  HENT:   Head: Normocephalic and atraumatic  Eyes: EOM are normal    Neck: Normal range of motion  Cardiovascular: Normal rate, regular rhythm and normal heart sounds  Pulmonary/Chest: Effort normal and breath sounds normal    Abdominal: Soft  Musculoskeletal:   Decreased ROM in the Right knee with swelling to the area, tender superior to the Right patella, mild redness with no erythema, no warmth  Neurological: She is alert and oriented to person, place, and time  Skin: Skin is warm and dry  Psychiatric: She has a normal mood and affect  Her behavior is normal  Thought content normal    Nursing note and vitals reviewed  Vital Signs  ED Triage Vitals [08/14/18 1702]   Temperature Pulse Respirations Blood Pressure SpO2   98 9 °F (37 2 °C) 101 18 (!) 173/77 99 %      Temp Source Heart Rate Source Patient Position - Orthostatic VS BP Location FiO2 (%)   Oral Monitor Sitting Right arm --      Pain Score       9           Vitals:    08/14/18 1702   BP: (!) 173/77   Pulse: 101   Patient Position - Orthostatic VS: Sitting       Visual Acuity      ED Medications  Medications   ibuprofen (MOTRIN) tablet 600 mg (600 mg Oral Given 8/14/18 1744)       Diagnostic Studies  Results Reviewed     None                 No orders to display              Procedures  Procedures       Phone Contacts  ED Phone Contact    ED Course                               MDM  Number of Diagnoses or Management Options  Acute pain of right knee: new and requires workup  Diagnosis management comments: 1  Right knee pain - Pt with continued pain in the Right knee, swelling to the area  She had imaging of the knee in visit 2 days ago, found to have likely Hematoma  Pt does not have signs of soft tissue infection  Main complaint is pain control  Will give dose of Toradol, lidocaine patch, ACE wrap  Instructed pt to call orthopedics in the morning         Amount and/or Complexity of Data Reviewed  Review and summarize past medical records: yes    Patient Progress  Patient progress: stable    CritCare Time    Disposition  Final diagnoses:   Acute pain of right knee     Time reflects when diagnosis was documented in both MDM as applicable and the Disposition within this note     Time User Action Codes Description Comment    8/14/2018  6:05 PM Kamini Bhatt Acute pain of right knee       ED Disposition     ED Disposition Condition Comment    Discharge  [address] discharge to home/self care  Condition at discharge: Stable        Follow-up Information     Follow up With Specialties Details Why Contact Info    Yael Sutherland MD Orthopedic Surgery Schedule an appointment as soon as possible for a visit  [address] [place] [address]  [phone]            Discharge Medication List as of 8/14/2018  6:09 PM      START taking these medications    Details   lidocaine (LMX) 4 % cream Apply topically as needed for mild pain, Starting Tue 8/14/2018, Normal         CONTINUE these medications which have NOT CHANGED    Details   apixaban (ELIQUIS) 5 mg Take 5 mg by mouth 2 (two) times a day, Historical Med      ARIPiprazole (ABILIFY) 5 mg tablet Take 1 tablet by mouth daily, Starting Fri 8/4/2017, Print      aspirin 325 mg tablet Take 325 mg by mouth daily  , Until Discontinued, Historical Med      fluvoxaMINE (LUVOX) 100 mg tablet Take 300 mg by mouth daily at bedtime  , Historical Med      gabapentin (NEURONTIN) 600 MG tablet Take 600 mg by mouth 3 (three) times a day , Until Discontinued, Historical Med      hydrocortisone (CORTEF) 10 mg tablet Take 1 tablet by mouth 3 (three) times a day, Starting Fri 8/4/2017, Print      lamoTRIgine (LaMICtal) 200 MG tablet Take 200 mg by mouth daily  , Until Discontinued, Historical Med      LORazepam (ATIVAN) 1 mg tablet Take 2 mg by mouth 3 (three) times a day  , Historical Med      magnesium 30 MG tablet Take 400 mg by mouth daily, Historical Med      meclizine (ANTIVERT) 25 mg tablet Take 25 mg by mouth every 8 (eight) hours  , Starting Sun 9/17/2017, Until Mon 9/17/2018, Historical Med      methocarbamol (ROBAXIN) 500 mg tablet Take 2 tablets by mouth 3 (three) times a day as needed for muscle spasms, Starting Fri 8/4/2017, No Print      midodrine (PROAMATINE) 5 mg tablet Take 2 tablets by mouth 3 (three) times a day, Starting Sat 1/6/2018, Print      ondansetron (ZOFRAN-ODT) 4 mg disintegrating tablet Take 4 mg by mouth every 8 (eight) hours, Starting Mon 12/18/2017, Historical Med      zonisamide (ZONEGRAN) 50 MG capsule Take 1 capsule by mouth daily, Starting Fri 8/4/2017, No Print      cephalexin (KEFLEX) 500 mg capsule Take 1 capsule (500 mg total) by mouth 4 (four) times a day for 10 days, Starting Sun 8/12/2018, Until Wed 8/22/2018, Print      HYDROcodone-acetaminophen (NORCO) 5-325 mg per tablet Take 1 tablet by mouth every 6 (six) hours as needed for pain for up to 10 days Max Daily Amount: 4 tablets, Starting Sun 8/12/2018, Until Wed 8/22/2018, Print      sulfamethoxazole-trimethoprim (BACTRIM DS) 800-160 mg per tablet Take 1 tablet by mouth 2 (two) times a day for 10 days, Starting Sun 8/12/2018, Until Wed 8/22/2018, Print           No discharge procedures on file      ED Provider  Electronically Signed by           Hildegarde Severin, MD  08/14/18 5874

## 2018-08-14 NOTE — DISCHARGE INSTRUCTIONS
Take acetaminophen regularly for discomfort, use your Vicodin if it is not working  You can try the lidocaine cream over your knee  Use ice over your knee  The number for the orthopedic surgeon is included in these discharge instructions, call for follow up  Continue to use the ACE wrap for compression  Knee Pain   WHAT YOU NEED TO KNOW:   Knee pain may start suddenly, or it may be a long-term problem  You may have pain on the side, front, or back of your knee  You may have knee stiffness and swelling  You may hear popping sounds or feel like your knee is giving way or locking up as you walk  You may feel pain when you sit, stand, walk, or climb up and down stairs  Knee pain can be caused by conditions such as obesity, inflammation, or strains or tears in ligaments or tendons  DISCHARGE INSTRUCTIONS:   Follow up with your healthcare provider within 24 hours or as directed: You may need follow-up treatments, such as steroid injections to decrease pain  Write down your questions so you remember to ask them during your visits  Self-care:   · Rest  your knee so it can heal  Limit activities that increase your pain  · Ice  can help reduce swelling  Wrap ice in a towel and put it on your knee for as long and as often as directed  · Compression  with a brace or bandage can help reduce swelling  Use a brace or bandage only as directed  · Elevation  helps decrease pain and swelling  Elevate your knee while you are sitting or lying down  Prop your leg on pillows to keep your knee above the level of your heart  Medicines:   · NSAIDs  help decrease swelling and pain or fever  This medicine is available with or without a doctor's order  NSAIDs can cause stomach bleeding or kidney problems in certain people  If you take blood thinner medicine, always ask your healthcare provider if NSAIDs are safe for you  Always read the medicine label and follow directions      · Acetaminophen  decreases pain and fever  It is available without a doctor's order  Ask how much to take and when to take it  Follow directions  Acetaminophen can cause liver damage if not taken correctly  · Take your medicine as directed  Contact your healthcare provider if you think your medicine is not helping or if you have side effects  Tell him or her if you are allergic to any medicine  Keep a list of the medicines, vitamins, and herbs you take  Include the amounts, and when and why you take them  Bring the list or the pill bottles to follow-up visits  Carry your medicine list with you in case of an emergency  Exercise as directed: You may need to see a physical therapist or do recommended exercises to improve movement and decrease your pain  You may be directed to walk, swim, or ride a bike  Follow your exercise plan exactly as directed to avoid further injury  Contact your healthcare provider if:   · You have questions or concerns about your condition or care  Return to the emergency department if:   · Your pain is worse, even after treatment  · You cannot bend or straighten your leg completely  · The swelling around your knee does not go down even with treatment  · Your knee is painful and hot to the touch  © 2017 [employer]  Information is for End User's use only and may not be sold, redistributed or otherwise used for commercial purposes  All illustrations and images included in CareNotes® are the copyrighted property of [employer]  or [employer]  The above information is an  only  It is not intended as medical advice for individual conditions or treatments  Talk to your doctor, nurse or pharmacist before following any medical regimen to see if it is safe and effective for you

## 2018-08-14 NOTE — ED NOTES
Pt states she was evaluated at ER on Sunday for redness of right knee and diagnosed with cellulitis  Pt states she has been taking antibiotic as prescribed but knee continues to be painful and red areas are radiating from knee to right upper leg  Blotchy purple/red spots noted but no generalized edema noted to right knee  Pt reports increased pain, especially with ambulation        Carmela Vera RN  08/14/18 5733

## 2019-04-05 ENCOUNTER — HOSPITAL ENCOUNTER (INPATIENT)
Facility: HOSPITAL | Age: 59
LOS: 3 days | DRG: 103 | End: 2019-04-08
Attending: EMERGENCY MEDICINE | Admitting: INTERNAL MEDICINE
Payer: COMMERCIAL

## 2019-04-05 DIAGNOSIS — G43.901 STATUS MIGRAINOSUS: Primary | ICD-10-CM

## 2019-04-05 PROBLEM — F41.9 ANXIETY: Status: ACTIVE | Noted: 2019-04-05

## 2019-04-05 LAB
ALBUMIN SERPL BCP-MCNC: 2.9 G/DL (ref 3.5–5)
ALP SERPL-CCNC: 132 U/L (ref 46–116)
ALT SERPL W P-5'-P-CCNC: 18 U/L (ref 12–78)
ANION GAP SERPL CALCULATED.3IONS-SCNC: 6 MMOL/L (ref 4–13)
AST SERPL W P-5'-P-CCNC: 35 U/L (ref 5–45)
BASOPHILS # BLD AUTO: 0.04 THOUSANDS/ΜL (ref 0–0.1)
BASOPHILS NFR BLD AUTO: 1 % (ref 0–1)
BILIRUB DIRECT SERPL-MCNC: <0.05 MG/DL (ref 0–0.2)
BILIRUB SERPL-MCNC: 0.21 MG/DL (ref 0.2–1)
BUN SERPL-MCNC: 15 MG/DL (ref 5–25)
CALCIUM SERPL-MCNC: 9 MG/DL (ref 8.3–10.1)
CHLORIDE SERPL-SCNC: 107 MMOL/L (ref 100–108)
CO2 SERPL-SCNC: 29 MMOL/L (ref 21–32)
CREAT SERPL-MCNC: 0.81 MG/DL (ref 0.6–1.3)
EOSINOPHIL # BLD AUTO: 0.31 THOUSAND/ΜL (ref 0–0.61)
EOSINOPHIL NFR BLD AUTO: 5 % (ref 0–6)
ERYTHROCYTE [DISTWIDTH] IN BLOOD BY AUTOMATED COUNT: 17.2 % (ref 11.6–15.1)
GFR SERPL CREATININE-BSD FRML MDRD: 79 ML/MIN/1.73SQ M
GLUCOSE SERPL-MCNC: 86 MG/DL (ref 65–140)
HCT VFR BLD AUTO: 33.8 % (ref 34.8–46.1)
HGB BLD-MCNC: 10 G/DL (ref 11.5–15.4)
IMM GRANULOCYTES # BLD AUTO: 0.02 THOUSAND/UL (ref 0–0.2)
IMM GRANULOCYTES NFR BLD AUTO: 0 % (ref 0–2)
LYMPHOCYTES # BLD AUTO: 1.95 THOUSANDS/ΜL (ref 0.6–4.47)
LYMPHOCYTES NFR BLD AUTO: 31 % (ref 14–44)
MCH RBC QN AUTO: 24.4 PG (ref 26.8–34.3)
MCHC RBC AUTO-ENTMCNC: 29.6 G/DL (ref 31.4–37.4)
MCV RBC AUTO: 83 FL (ref 82–98)
MONOCYTES # BLD AUTO: 0.62 THOUSAND/ΜL (ref 0.17–1.22)
MONOCYTES NFR BLD AUTO: 10 % (ref 4–12)
NEUTROPHILS # BLD AUTO: 3.37 THOUSANDS/ΜL (ref 1.85–7.62)
NEUTS SEG NFR BLD AUTO: 53 % (ref 43–75)
NRBC BLD AUTO-RTO: 0 /100 WBCS
PLATELET # BLD AUTO: 393 THOUSANDS/UL (ref 149–390)
PMV BLD AUTO: 9.7 FL (ref 8.9–12.7)
POTASSIUM SERPL-SCNC: 4.8 MMOL/L (ref 3.5–5.3)
PROT SERPL-MCNC: 7.1 G/DL (ref 6.4–8.2)
RBC # BLD AUTO: 4.09 MILLION/UL (ref 3.81–5.12)
SODIUM SERPL-SCNC: 142 MMOL/L (ref 136–145)
WBC # BLD AUTO: 6.31 THOUSAND/UL (ref 4.31–10.16)

## 2019-04-05 PROCEDURE — 96375 TX/PRO/DX INJ NEW DRUG ADDON: CPT

## 2019-04-05 PROCEDURE — 99284 EMERGENCY DEPT VISIT MOD MDM: CPT

## 2019-04-05 PROCEDURE — 36415 COLL VENOUS BLD VENIPUNCTURE: CPT | Performed by: PHYSICIAN ASSISTANT

## 2019-04-05 PROCEDURE — 96361 HYDRATE IV INFUSION ADD-ON: CPT

## 2019-04-05 PROCEDURE — 99285 EMERGENCY DEPT VISIT HI MDM: CPT | Performed by: PHYSICIAN ASSISTANT

## 2019-04-05 PROCEDURE — 99223 1ST HOSP IP/OBS HIGH 75: CPT | Performed by: INTERNAL MEDICINE

## 2019-04-05 PROCEDURE — 96365 THER/PROPH/DIAG IV INF INIT: CPT

## 2019-04-05 PROCEDURE — 85025 COMPLETE CBC W/AUTO DIFF WBC: CPT | Performed by: PHYSICIAN ASSISTANT

## 2019-04-05 PROCEDURE — 80048 BASIC METABOLIC PNL TOTAL CA: CPT | Performed by: PHYSICIAN ASSISTANT

## 2019-04-05 PROCEDURE — 80076 HEPATIC FUNCTION PANEL: CPT | Performed by: PHYSICIAN ASSISTANT

## 2019-04-05 PROCEDURE — 87081 CULTURE SCREEN ONLY: CPT | Performed by: INTERNAL MEDICINE

## 2019-04-05 RX ORDER — SODIUM CHLORIDE 9 MG/ML
125 INJECTION, SOLUTION INTRAVENOUS CONTINUOUS
Status: DISCONTINUED | OUTPATIENT
Start: 2019-04-05 | End: 2019-04-06

## 2019-04-05 RX ORDER — METOCLOPRAMIDE HYDROCHLORIDE 5 MG/ML
10 INJECTION INTRAMUSCULAR; INTRAVENOUS ONCE
Status: COMPLETED | OUTPATIENT
Start: 2019-04-05 | End: 2019-04-05

## 2019-04-05 RX ORDER — FLUVOXAMINE MALEATE 50 MG/1
300 TABLET, COATED ORAL
Status: DISCONTINUED | OUTPATIENT
Start: 2019-04-05 | End: 2019-04-08 | Stop reason: HOSPADM

## 2019-04-05 RX ORDER — MAGNESIUM SULFATE HEPTAHYDRATE 40 MG/ML
2 INJECTION, SOLUTION INTRAVENOUS
Status: COMPLETED | OUTPATIENT
Start: 2019-04-06 | End: 2019-04-08

## 2019-04-05 RX ORDER — GABAPENTIN 300 MG/1
600 CAPSULE ORAL 3 TIMES DAILY
Status: DISCONTINUED | OUTPATIENT
Start: 2019-04-05 | End: 2019-04-08 | Stop reason: HOSPADM

## 2019-04-05 RX ORDER — HEPARIN SODIUM 5000 [USP'U]/ML
5000 INJECTION, SOLUTION INTRAVENOUS; SUBCUTANEOUS EVERY 8 HOURS SCHEDULED
Status: DISCONTINUED | OUTPATIENT
Start: 2019-04-05 | End: 2019-04-08 | Stop reason: HOSPADM

## 2019-04-05 RX ORDER — ONDANSETRON 4 MG/1
4 TABLET, ORALLY DISINTEGRATING ORAL ONCE
Status: COMPLETED | OUTPATIENT
Start: 2019-04-05 | End: 2019-04-05

## 2019-04-05 RX ORDER — DIPHENHYDRAMINE HYDROCHLORIDE 50 MG/ML
25 INJECTION INTRAMUSCULAR; INTRAVENOUS ONCE
Status: COMPLETED | OUTPATIENT
Start: 2019-04-05 | End: 2019-04-05

## 2019-04-05 RX ORDER — ONDANSETRON 2 MG/ML
4 INJECTION INTRAMUSCULAR; INTRAVENOUS EVERY 6 HOURS PRN
Status: DISCONTINUED | OUTPATIENT
Start: 2019-04-05 | End: 2019-04-07

## 2019-04-05 RX ORDER — LAMOTRIGINE 100 MG/1
200 TABLET ORAL DAILY
Status: DISCONTINUED | OUTPATIENT
Start: 2019-04-06 | End: 2019-04-08 | Stop reason: HOSPADM

## 2019-04-05 RX ORDER — ACETAMINOPHEN 325 MG/1
650 TABLET ORAL EVERY 6 HOURS PRN
Status: DISCONTINUED | OUTPATIENT
Start: 2019-04-05 | End: 2019-04-08 | Stop reason: HOSPADM

## 2019-04-05 RX ORDER — ACETAMINOPHEN 325 MG/1
650 TABLET ORAL EVERY 6 HOURS PRN
Status: DISCONTINUED | OUTPATIENT
Start: 2019-04-05 | End: 2019-04-05 | Stop reason: SDUPTHER

## 2019-04-05 RX ORDER — ZONISAMIDE 100 MG/1
200 CAPSULE ORAL DAILY
Status: DISCONTINUED | OUTPATIENT
Start: 2019-04-06 | End: 2019-04-08 | Stop reason: HOSPADM

## 2019-04-05 RX ORDER — LORAZEPAM 1 MG/1
2 TABLET ORAL 3 TIMES DAILY
Status: DISCONTINUED | OUTPATIENT
Start: 2019-04-05 | End: 2019-04-08 | Stop reason: HOSPADM

## 2019-04-05 RX ORDER — METHOCARBAMOL 500 MG/1
1000 TABLET, FILM COATED ORAL 3 TIMES DAILY PRN
Status: DISCONTINUED | OUTPATIENT
Start: 2019-04-05 | End: 2019-04-08 | Stop reason: HOSPADM

## 2019-04-05 RX ADMIN — SODIUM CHLORIDE 125 ML/HR: 0.9 INJECTION, SOLUTION INTRAVENOUS at 17:59

## 2019-04-05 RX ADMIN — GABAPENTIN 600 MG: 300 CAPSULE ORAL at 22:12

## 2019-04-05 RX ADMIN — VALPROATE SODIUM 500 MG: 100 INJECTION, SOLUTION INTRAVENOUS at 14:18

## 2019-04-05 RX ADMIN — SODIUM CHLORIDE 1000 ML: 0.9 INJECTION, SOLUTION INTRAVENOUS at 13:28

## 2019-04-05 RX ADMIN — ONDANSETRON 4 MG: 2 INJECTION INTRAMUSCULAR; INTRAVENOUS at 18:44

## 2019-04-05 RX ADMIN — HEPARIN SODIUM 5000 UNITS: 5000 INJECTION INTRAVENOUS; SUBCUTANEOUS at 22:12

## 2019-04-05 RX ADMIN — METOCLOPRAMIDE 10 MG: 5 INJECTION, SOLUTION INTRAMUSCULAR; INTRAVENOUS at 13:28

## 2019-04-05 RX ADMIN — ONDANSETRON 4 MG: 4 TABLET, ORALLY DISINTEGRATING ORAL at 15:27

## 2019-04-05 RX ADMIN — METHOCARBAMOL TABLETS 1000 MG: 500 TABLET, COATED ORAL at 18:44

## 2019-04-05 RX ADMIN — DIPHENHYDRAMINE HYDROCHLORIDE 25 MG: 50 INJECTION, SOLUTION INTRAMUSCULAR; INTRAVENOUS at 13:28

## 2019-04-05 RX ADMIN — LORAZEPAM 2 MG: 1 TABLET ORAL at 22:11

## 2019-04-05 RX ADMIN — FLUVOXAMINE MALEATE 300 MG: 50 TABLET, FILM COATED ORAL at 22:15

## 2019-04-06 LAB
ERYTHROCYTE [DISTWIDTH] IN BLOOD BY AUTOMATED COUNT: 17.2 % (ref 11.6–15.1)
HCT VFR BLD AUTO: 30.6 % (ref 34.8–46.1)
HGB BLD-MCNC: 9.2 G/DL (ref 11.5–15.4)
MCH RBC QN AUTO: 25 PG (ref 26.8–34.3)
MCHC RBC AUTO-ENTMCNC: 30.1 G/DL (ref 31.4–37.4)
MCV RBC AUTO: 83 FL (ref 82–98)
PLATELET # BLD AUTO: 354 THOUSANDS/UL (ref 149–390)
PMV BLD AUTO: 9.5 FL (ref 8.9–12.7)
RBC # BLD AUTO: 3.68 MILLION/UL (ref 3.81–5.12)
WBC # BLD AUTO: 6.72 THOUSAND/UL (ref 4.31–10.16)

## 2019-04-06 PROCEDURE — 85027 COMPLETE CBC AUTOMATED: CPT | Performed by: PHYSICIAN ASSISTANT

## 2019-04-06 PROCEDURE — 99232 SBSQ HOSP IP/OBS MODERATE 35: CPT | Performed by: INTERNAL MEDICINE

## 2019-04-06 PROCEDURE — 99254 IP/OBS CNSLTJ NEW/EST MOD 60: CPT | Performed by: PSYCHIATRY & NEUROLOGY

## 2019-04-06 RX ORDER — SCOLOPAMINE TRANSDERMAL SYSTEM 1 MG/1
1 PATCH, EXTENDED RELEASE TRANSDERMAL
Status: DISCONTINUED | OUTPATIENT
Start: 2019-04-06 | End: 2019-04-06

## 2019-04-06 RX ORDER — DIHYDROERGOTAMINE MESYLATE 1 MG/ML
1 INJECTION, SOLUTION INTRAMUSCULAR; INTRAVENOUS; SUBCUTANEOUS EVERY 8 HOURS SCHEDULED
Status: COMPLETED | OUTPATIENT
Start: 2019-04-06 | End: 2019-04-07

## 2019-04-06 RX ORDER — DIPHENHYDRAMINE HCL 25 MG
25 TABLET ORAL
Status: DISCONTINUED | OUTPATIENT
Start: 2019-04-06 | End: 2019-04-08 | Stop reason: HOSPADM

## 2019-04-06 RX ORDER — SCOLOPAMINE TRANSDERMAL SYSTEM 1 MG/1
1 PATCH, EXTENDED RELEASE TRANSDERMAL
Status: DISCONTINUED | OUTPATIENT
Start: 2019-04-06 | End: 2019-04-08 | Stop reason: HOSPADM

## 2019-04-06 RX ORDER — METOCLOPRAMIDE HYDROCHLORIDE 5 MG/ML
10 INJECTION INTRAMUSCULAR; INTRAVENOUS ONCE
Status: COMPLETED | OUTPATIENT
Start: 2019-04-06 | End: 2019-04-06

## 2019-04-06 RX ORDER — DIPHENHYDRAMINE HYDROCHLORIDE 50 MG/ML
25 INJECTION INTRAMUSCULAR; INTRAVENOUS ONCE
Status: COMPLETED | OUTPATIENT
Start: 2019-04-06 | End: 2019-04-06

## 2019-04-06 RX ADMIN — SODIUM CHLORIDE 1000 MG: 0.9 INJECTION, SOLUTION INTRAVENOUS at 11:12

## 2019-04-06 RX ADMIN — GABAPENTIN 600 MG: 300 CAPSULE ORAL at 16:25

## 2019-04-06 RX ADMIN — METOCLOPRAMIDE 10 MG: 5 INJECTION, SOLUTION INTRAMUSCULAR; INTRAVENOUS at 03:02

## 2019-04-06 RX ADMIN — HEPARIN SODIUM 5000 UNITS: 5000 INJECTION INTRAVENOUS; SUBCUTANEOUS at 05:50

## 2019-04-06 RX ADMIN — GABAPENTIN 600 MG: 300 CAPSULE ORAL at 21:28

## 2019-04-06 RX ADMIN — MAGNESIUM SULFATE HEPTAHYDRATE 2 G: 40 INJECTION, SOLUTION INTRAVENOUS at 09:33

## 2019-04-06 RX ADMIN — ONDANSETRON 4 MG: 2 INJECTION INTRAMUSCULAR; INTRAVENOUS at 14:37

## 2019-04-06 RX ADMIN — ZONISAMIDE 200 MG: 100 CAPSULE ORAL at 09:32

## 2019-04-06 RX ADMIN — DIHYDROERGOTAMINE MESYLATE 1 MG: 1 INJECTION, SOLUTION INTRAMUSCULAR; INTRAVENOUS; SUBCUTANEOUS at 16:24

## 2019-04-06 RX ADMIN — METHOCARBAMOL TABLETS 1000 MG: 500 TABLET, COATED ORAL at 02:24

## 2019-04-06 RX ADMIN — ACETAMINOPHEN 650 MG: 325 TABLET ORAL at 08:20

## 2019-04-06 RX ADMIN — SCOPALAMINE 1 PATCH: 1 PATCH, EXTENDED RELEASE TRANSDERMAL at 21:33

## 2019-04-06 RX ADMIN — METOCLOPRAMIDE 10 MG: 5 INJECTION, SOLUTION INTRAMUSCULAR; INTRAVENOUS at 17:35

## 2019-04-06 RX ADMIN — ACETAMINOPHEN 650 MG: 325 TABLET ORAL at 02:25

## 2019-04-06 RX ADMIN — ONDANSETRON 4 MG: 2 INJECTION INTRAMUSCULAR; INTRAVENOUS at 02:25

## 2019-04-06 RX ADMIN — HEPARIN SODIUM 5000 UNITS: 5000 INJECTION INTRAVENOUS; SUBCUTANEOUS at 16:24

## 2019-04-06 RX ADMIN — DIPHENHYDRAMINE HCL 25 MG: 25 TABLET, FILM COATED ORAL at 21:28

## 2019-04-06 RX ADMIN — VALPROATE SODIUM 500 MG: 100 INJECTION, SOLUTION INTRAVENOUS at 02:25

## 2019-04-06 RX ADMIN — ONDANSETRON 4 MG: 2 INJECTION INTRAMUSCULAR; INTRAVENOUS at 20:31

## 2019-04-06 RX ADMIN — LORAZEPAM 2 MG: 1 TABLET ORAL at 21:28

## 2019-04-06 RX ADMIN — LORAZEPAM 2 MG: 1 TABLET ORAL at 16:25

## 2019-04-06 RX ADMIN — HEPARIN SODIUM 5000 UNITS: 5000 INJECTION INTRAVENOUS; SUBCUTANEOUS at 21:31

## 2019-04-06 RX ADMIN — DIPHENHYDRAMINE HYDROCHLORIDE 25 MG: 50 INJECTION INTRAMUSCULAR; INTRAVENOUS at 03:02

## 2019-04-06 RX ADMIN — DIHYDROERGOTAMINE MESYLATE 1 MG: 1 INJECTION, SOLUTION INTRAMUSCULAR; INTRAVENOUS; SUBCUTANEOUS at 11:19

## 2019-04-06 RX ADMIN — SODIUM CHLORIDE 125 ML/HR: 0.9 INJECTION, SOLUTION INTRAVENOUS at 13:02

## 2019-04-06 RX ADMIN — ARIPIPRAZOLE 15 MG: 5 TABLET ORAL at 09:32

## 2019-04-06 RX ADMIN — LAMOTRIGINE 200 MG: 100 TABLET ORAL at 09:32

## 2019-04-06 RX ADMIN — GABAPENTIN 600 MG: 300 CAPSULE ORAL at 09:32

## 2019-04-06 RX ADMIN — LORAZEPAM 2 MG: 1 TABLET ORAL at 09:31

## 2019-04-06 RX ADMIN — DIHYDROERGOTAMINE MESYLATE 1 MG: 1 INJECTION, SOLUTION INTRAMUSCULAR; INTRAVENOUS; SUBCUTANEOUS at 21:33

## 2019-04-06 RX ADMIN — VALPROATE SODIUM 1000 MG: 100 INJECTION, SOLUTION INTRAVENOUS at 14:37

## 2019-04-06 RX ADMIN — ONDANSETRON 4 MG: 2 INJECTION INTRAMUSCULAR; INTRAVENOUS at 08:23

## 2019-04-07 LAB
ANION GAP SERPL CALCULATED.3IONS-SCNC: 11 MMOL/L (ref 4–13)
BASOPHILS # BLD AUTO: 0.02 THOUSANDS/ΜL (ref 0–0.1)
BASOPHILS NFR BLD AUTO: 0 % (ref 0–1)
BUN SERPL-MCNC: 12 MG/DL (ref 5–25)
CALCIUM SERPL-MCNC: 9.1 MG/DL (ref 8.3–10.1)
CHLORIDE SERPL-SCNC: 107 MMOL/L (ref 100–108)
CO2 SERPL-SCNC: 21 MMOL/L (ref 21–32)
CREAT SERPL-MCNC: 0.69 MG/DL (ref 0.6–1.3)
EOSINOPHIL # BLD AUTO: 0 THOUSAND/ΜL (ref 0–0.61)
EOSINOPHIL NFR BLD AUTO: 0 % (ref 0–6)
ERYTHROCYTE [DISTWIDTH] IN BLOOD BY AUTOMATED COUNT: 17 % (ref 11.6–15.1)
GFR SERPL CREATININE-BSD FRML MDRD: 95 ML/MIN/1.73SQ M
GLUCOSE SERPL-MCNC: 151 MG/DL (ref 65–140)
HCT VFR BLD AUTO: 36 % (ref 34.8–46.1)
HGB BLD-MCNC: 10.9 G/DL (ref 11.5–15.4)
IMM GRANULOCYTES # BLD AUTO: 0.05 THOUSAND/UL (ref 0–0.2)
IMM GRANULOCYTES NFR BLD AUTO: 1 % (ref 0–2)
LYMPHOCYTES # BLD AUTO: 0.66 THOUSANDS/ΜL (ref 0.6–4.47)
LYMPHOCYTES NFR BLD AUTO: 6 % (ref 14–44)
MCH RBC QN AUTO: 24.7 PG (ref 26.8–34.3)
MCHC RBC AUTO-ENTMCNC: 30.3 G/DL (ref 31.4–37.4)
MCV RBC AUTO: 82 FL (ref 82–98)
MONOCYTES # BLD AUTO: 0.32 THOUSAND/ΜL (ref 0.17–1.22)
MONOCYTES NFR BLD AUTO: 3 % (ref 4–12)
MRSA NOSE QL CULT: NORMAL
NEUTROPHILS # BLD AUTO: 9.75 THOUSANDS/ΜL (ref 1.85–7.62)
NEUTS SEG NFR BLD AUTO: 90 % (ref 43–75)
NRBC BLD AUTO-RTO: 0 /100 WBCS
PLATELET # BLD AUTO: 372 THOUSANDS/UL (ref 149–390)
PMV BLD AUTO: 9.5 FL (ref 8.9–12.7)
POTASSIUM SERPL-SCNC: 4.1 MMOL/L (ref 3.5–5.3)
POTASSIUM SERPL-SCNC: 5.8 MMOL/L (ref 3.5–5.3)
RBC # BLD AUTO: 4.41 MILLION/UL (ref 3.81–5.12)
SODIUM SERPL-SCNC: 139 MMOL/L (ref 136–145)
WBC # BLD AUTO: 10.8 THOUSAND/UL (ref 4.31–10.16)

## 2019-04-07 PROCEDURE — 85025 COMPLETE CBC W/AUTO DIFF WBC: CPT | Performed by: INTERNAL MEDICINE

## 2019-04-07 PROCEDURE — 99232 SBSQ HOSP IP/OBS MODERATE 35: CPT | Performed by: INTERNAL MEDICINE

## 2019-04-07 PROCEDURE — 84132 ASSAY OF SERUM POTASSIUM: CPT | Performed by: INTERNAL MEDICINE

## 2019-04-07 PROCEDURE — 80048 BASIC METABOLIC PNL TOTAL CA: CPT | Performed by: INTERNAL MEDICINE

## 2019-04-07 PROCEDURE — 99232 SBSQ HOSP IP/OBS MODERATE 35: CPT | Performed by: PSYCHIATRY & NEUROLOGY

## 2019-04-07 RX ORDER — METOCLOPRAMIDE HYDROCHLORIDE 5 MG/ML
10 INJECTION INTRAMUSCULAR; INTRAVENOUS EVERY 6 HOURS PRN
Status: DISCONTINUED | OUTPATIENT
Start: 2019-04-07 | End: 2019-04-07 | Stop reason: SDUPTHER

## 2019-04-07 RX ORDER — METOCLOPRAMIDE HYDROCHLORIDE 5 MG/ML
10 INJECTION INTRAMUSCULAR; INTRAVENOUS EVERY 6 HOURS PRN
Status: DISCONTINUED | OUTPATIENT
Start: 2019-04-09 | End: 2019-04-08 | Stop reason: HOSPADM

## 2019-04-07 RX ORDER — METOCLOPRAMIDE 10 MG/1
10 TABLET ORAL EVERY 8 HOURS SCHEDULED
Status: DISCONTINUED | OUTPATIENT
Start: 2019-04-07 | End: 2019-04-07

## 2019-04-07 RX ORDER — METOCLOPRAMIDE HYDROCHLORIDE 5 MG/ML
10 INJECTION INTRAMUSCULAR; INTRAVENOUS EVERY 8 HOURS SCHEDULED
Status: DISCONTINUED | OUTPATIENT
Start: 2019-04-07 | End: 2019-04-08 | Stop reason: HOSPADM

## 2019-04-07 RX ORDER — DIHYDROERGOTAMINE MESYLATE 1 MG/ML
1 INJECTION, SOLUTION INTRAMUSCULAR; INTRAVENOUS; SUBCUTANEOUS EVERY 8 HOURS SCHEDULED
Status: DISCONTINUED | OUTPATIENT
Start: 2019-04-08 | End: 2019-04-08

## 2019-04-07 RX ORDER — SODIUM POLYSTYRENE SULFONATE 4.1 MEQ/G
15 POWDER, FOR SUSPENSION ORAL; RECTAL ONCE
Status: COMPLETED | OUTPATIENT
Start: 2019-04-07 | End: 2019-04-07

## 2019-04-07 RX ORDER — ALBUTEROL SULFATE 2.5 MG/3ML
2.5 SOLUTION RESPIRATORY (INHALATION) 2 TIMES DAILY
Status: DISPENSED | OUTPATIENT
Start: 2019-04-07 | End: 2019-04-08

## 2019-04-07 RX ORDER — DIHYDROERGOTAMINE MESYLATE 1 MG/ML
1 INJECTION, SOLUTION INTRAMUSCULAR; INTRAVENOUS; SUBCUTANEOUS EVERY 8 HOURS SCHEDULED
Status: DISCONTINUED | OUTPATIENT
Start: 2019-04-07 | End: 2019-04-07

## 2019-04-07 RX ORDER — ONDANSETRON 4 MG/1
4 TABLET, ORALLY DISINTEGRATING ORAL EVERY 6 HOURS PRN
Status: DISCONTINUED | OUTPATIENT
Start: 2019-04-07 | End: 2019-04-08 | Stop reason: HOSPADM

## 2019-04-07 RX ADMIN — HEPARIN SODIUM 5000 UNITS: 5000 INJECTION INTRAVENOUS; SUBCUTANEOUS at 07:22

## 2019-04-07 RX ADMIN — DIHYDROERGOTAMINE MESYLATE 1 MG: 1 INJECTION, SOLUTION INTRAMUSCULAR; INTRAVENOUS; SUBCUTANEOUS at 23:26

## 2019-04-07 RX ADMIN — LORAZEPAM 2 MG: 1 TABLET ORAL at 22:07

## 2019-04-07 RX ADMIN — ONDANSETRON 4 MG: 2 INJECTION INTRAMUSCULAR; INTRAVENOUS at 09:12

## 2019-04-07 RX ADMIN — DIHYDROERGOTAMINE MESYLATE 1 MG: 1 INJECTION, SOLUTION INTRAMUSCULAR; INTRAVENOUS; SUBCUTANEOUS at 07:22

## 2019-04-07 RX ADMIN — ACETAMINOPHEN 650 MG: 325 TABLET ORAL at 03:09

## 2019-04-07 RX ADMIN — FLUVOXAMINE MALEATE 300 MG: 50 TABLET, FILM COATED ORAL at 01:09

## 2019-04-07 RX ADMIN — SODIUM CHLORIDE 1000 MG: 0.9 INJECTION, SOLUTION INTRAVENOUS at 11:14

## 2019-04-07 RX ADMIN — VALPROATE SODIUM 1000 MG: 100 INJECTION, SOLUTION INTRAVENOUS at 10:37

## 2019-04-07 RX ADMIN — GABAPENTIN 600 MG: 300 CAPSULE ORAL at 15:45

## 2019-04-07 RX ADMIN — MAGNESIUM SULFATE HEPTAHYDRATE 2 G: 40 INJECTION, SOLUTION INTRAVENOUS at 14:19

## 2019-04-07 RX ADMIN — FLUVOXAMINE MALEATE 300 MG: 50 TABLET, FILM COATED ORAL at 22:07

## 2019-04-07 RX ADMIN — METHOCARBAMOL TABLETS 1000 MG: 500 TABLET, COATED ORAL at 01:09

## 2019-04-07 RX ADMIN — SODIUM POLYSTYRENE SULFONATE 15 G: 1 POWDER ORAL; RECTAL at 10:34

## 2019-04-07 RX ADMIN — LORAZEPAM 2 MG: 1 TABLET ORAL at 08:35

## 2019-04-07 RX ADMIN — ZONISAMIDE 200 MG: 100 CAPSULE ORAL at 08:35

## 2019-04-07 RX ADMIN — GABAPENTIN 600 MG: 300 CAPSULE ORAL at 08:35

## 2019-04-07 RX ADMIN — METOCLOPRAMIDE 10 MG: 5 INJECTION, SOLUTION INTRAMUSCULAR; INTRAVENOUS at 14:28

## 2019-04-07 RX ADMIN — HEPARIN SODIUM 5000 UNITS: 5000 INJECTION INTRAVENOUS; SUBCUTANEOUS at 14:27

## 2019-04-07 RX ADMIN — LORAZEPAM 2 MG: 1 TABLET ORAL at 15:45

## 2019-04-07 RX ADMIN — DIPHENHYDRAMINE HCL 25 MG: 25 TABLET, FILM COATED ORAL at 22:07

## 2019-04-07 RX ADMIN — LAMOTRIGINE 200 MG: 100 TABLET ORAL at 08:35

## 2019-04-07 RX ADMIN — METOCLOPRAMIDE 10 MG: 5 INJECTION, SOLUTION INTRAMUSCULAR; INTRAVENOUS at 22:07

## 2019-04-07 RX ADMIN — GABAPENTIN 600 MG: 300 CAPSULE ORAL at 22:06

## 2019-04-07 RX ADMIN — ARIPIPRAZOLE 15 MG: 5 TABLET ORAL at 08:35

## 2019-04-07 RX ADMIN — HEPARIN SODIUM 5000 UNITS: 5000 INJECTION INTRAVENOUS; SUBCUTANEOUS at 22:07

## 2019-04-07 RX ADMIN — DIHYDROERGOTAMINE MESYLATE 1 MG: 1 INJECTION, SOLUTION INTRAMUSCULAR; INTRAVENOUS; SUBCUTANEOUS at 14:44

## 2019-04-08 ENCOUNTER — HOSPITAL ENCOUNTER (INPATIENT)
Facility: HOSPITAL | Age: 59
LOS: 5 days | Discharge: HOME | DRG: 103 | End: 2019-04-13
Attending: INTERNAL MEDICINE | Admitting: INTERNAL MEDICINE
Payer: COMMERCIAL

## 2019-04-08 VITALS
HEART RATE: 69 BPM | WEIGHT: 247.58 LBS | RESPIRATION RATE: 20 BRPM | DIASTOLIC BLOOD PRESSURE: 65 MMHG | OXYGEN SATURATION: 96 % | SYSTOLIC BLOOD PRESSURE: 121 MMHG | TEMPERATURE: 98.6 F | BODY MASS INDEX: 37.64 KG/M2

## 2019-04-08 DIAGNOSIS — G43.111 INTRACTABLE MIGRAINE WITH AURA WITH STATUS MIGRAINOSUS: Primary | ICD-10-CM

## 2019-04-08 DIAGNOSIS — D64.9 ANEMIA: ICD-10-CM

## 2019-04-08 LAB
ANION GAP SERPL CALCULATED.3IONS-SCNC: 9 MMOL/L (ref 4–13)
BUN SERPL-MCNC: 20 MG/DL (ref 5–25)
CALCIUM SERPL-MCNC: 9.4 MG/DL (ref 8.3–10.1)
CHLORIDE SERPL-SCNC: 107 MMOL/L (ref 100–108)
CO2 SERPL-SCNC: 27 MMOL/L (ref 21–32)
CREAT SERPL-MCNC: 0.97 MG/DL (ref 0.6–1.3)
GFR SERPL CREATININE-BSD FRML MDRD: 64 ML/MIN/1.73SQ M
GLUCOSE SERPL-MCNC: 127 MG/DL (ref 65–140)
POTASSIUM SERPL-SCNC: 3.8 MMOL/L (ref 3.5–5.3)
SODIUM SERPL-SCNC: 143 MMOL/L (ref 136–145)

## 2019-04-08 PROCEDURE — 80048 BASIC METABOLIC PNL TOTAL CA: CPT | Performed by: INTERNAL MEDICINE

## 2019-04-08 PROCEDURE — 99233 SBSQ HOSP IP/OBS HIGH 50: CPT | Performed by: PSYCHIATRY & NEUROLOGY

## 2019-04-08 PROCEDURE — 99239 HOSP IP/OBS DSCHRG MGMT >30: CPT | Performed by: INTERNAL MEDICINE

## 2019-04-08 PROCEDURE — 99222 1ST HOSP IP/OBS MODERATE 55: CPT | Performed by: INTERNAL MEDICINE

## 2019-04-08 PROCEDURE — C9113 INJ PANTOPRAZOLE SODIUM, VIA: HCPCS | Performed by: INTERNAL MEDICINE

## 2019-04-08 RX ORDER — SCOLOPAMINE TRANSDERMAL SYSTEM 1 MG/1
1 PATCH, EXTENDED RELEASE TRANSDERMAL
Status: CANCELLED | OUTPATIENT
Start: 2019-04-09

## 2019-04-08 RX ORDER — PANTOPRAZOLE SODIUM 40 MG/1
40 INJECTION, POWDER, FOR SOLUTION INTRAVENOUS
Status: CANCELLED | OUTPATIENT
Start: 2019-04-09

## 2019-04-08 RX ORDER — PANTOPRAZOLE SODIUM 40 MG/1
40 INJECTION, POWDER, FOR SOLUTION INTRAVENOUS
Status: DISCONTINUED | OUTPATIENT
Start: 2019-04-08 | End: 2019-04-08

## 2019-04-08 RX ORDER — ACETAMINOPHEN 325 MG/1
650 TABLET ORAL EVERY 6 HOURS PRN
Status: CANCELLED | OUTPATIENT
Start: 2019-04-08

## 2019-04-08 RX ORDER — METOCLOPRAMIDE HYDROCHLORIDE 5 MG/ML
10 INJECTION INTRAMUSCULAR; INTRAVENOUS EVERY 8 HOURS SCHEDULED
Status: CANCELLED | OUTPATIENT
Start: 2019-04-08 | End: 2019-04-09

## 2019-04-08 RX ORDER — ZONISAMIDE 100 MG/1
200 CAPSULE ORAL DAILY
Status: DISCONTINUED | OUTPATIENT
Start: 2019-04-09 | End: 2019-04-13 | Stop reason: HOSPADM

## 2019-04-08 RX ORDER — ONDANSETRON 4 MG/1
4 TABLET, ORALLY DISINTEGRATING ORAL EVERY 6 HOURS PRN
Status: DISCONTINUED | OUTPATIENT
Start: 2019-04-08 | End: 2019-04-11

## 2019-04-08 RX ORDER — METOCLOPRAMIDE HYDROCHLORIDE 5 MG/ML
10 INJECTION INTRAMUSCULAR; INTRAVENOUS EVERY 6 HOURS PRN
Status: CANCELLED | OUTPATIENT
Start: 2019-04-09

## 2019-04-08 RX ORDER — HEPARIN SODIUM 5000 [USP'U]/ML
5000 INJECTION, SOLUTION INTRAVENOUS; SUBCUTANEOUS EVERY 8 HOURS SCHEDULED
Status: CANCELLED | OUTPATIENT
Start: 2019-04-08

## 2019-04-08 RX ORDER — METOCLOPRAMIDE HYDROCHLORIDE 5 MG/ML
10 INJECTION INTRAMUSCULAR; INTRAVENOUS EVERY 8 HOURS SCHEDULED
Status: COMPLETED | OUTPATIENT
Start: 2019-04-08 | End: 2019-04-09

## 2019-04-08 RX ORDER — DIPHENHYDRAMINE HCL 25 MG
25 TABLET ORAL
Status: DISCONTINUED | OUTPATIENT
Start: 2019-04-08 | End: 2019-04-13 | Stop reason: HOSPADM

## 2019-04-08 RX ORDER — PANTOPRAZOLE SODIUM 40 MG/1
40 TABLET, DELAYED RELEASE ORAL
Status: DISCONTINUED | OUTPATIENT
Start: 2019-04-09 | End: 2019-04-13 | Stop reason: HOSPADM

## 2019-04-08 RX ORDER — DIPHENHYDRAMINE HCL 25 MG
25 TABLET ORAL
Status: CANCELLED | OUTPATIENT
Start: 2019-04-08

## 2019-04-08 RX ORDER — LAMOTRIGINE 100 MG/1
200 TABLET ORAL DAILY
Status: DISCONTINUED | OUTPATIENT
Start: 2019-04-09 | End: 2019-04-13 | Stop reason: HOSPADM

## 2019-04-08 RX ORDER — ZONISAMIDE 100 MG/1
200 CAPSULE ORAL DAILY
Status: CANCELLED | OUTPATIENT
Start: 2019-04-09

## 2019-04-08 RX ORDER — METHOCARBAMOL 500 MG/1
1000 TABLET, FILM COATED ORAL 3 TIMES DAILY PRN
Status: CANCELLED | OUTPATIENT
Start: 2019-04-08

## 2019-04-08 RX ORDER — DIHYDROERGOTAMINE MESYLATE 1 MG/ML
1 INJECTION, SOLUTION INTRAMUSCULAR; INTRAVENOUS; SUBCUTANEOUS EVERY 8 HOURS SCHEDULED
Status: COMPLETED | OUTPATIENT
Start: 2019-04-08 | End: 2019-04-08

## 2019-04-08 RX ORDER — FLUVOXAMINE MALEATE 50 MG/1
300 TABLET, COATED ORAL
Status: CANCELLED | OUTPATIENT
Start: 2019-04-08

## 2019-04-08 RX ORDER — HEPARIN SODIUM 5000 [USP'U]/ML
5000 INJECTION, SOLUTION INTRAVENOUS; SUBCUTANEOUS EVERY 8 HOURS SCHEDULED
Status: DISCONTINUED | OUTPATIENT
Start: 2019-04-08 | End: 2019-04-13 | Stop reason: HOSPADM

## 2019-04-08 RX ORDER — LORAZEPAM 1 MG/1
2 TABLET ORAL 3 TIMES DAILY
Status: CANCELLED | OUTPATIENT
Start: 2019-04-08

## 2019-04-08 RX ORDER — METHOCARBAMOL 500 MG/1
1000 TABLET, FILM COATED ORAL 3 TIMES DAILY PRN
Status: DISCONTINUED | OUTPATIENT
Start: 2019-04-08 | End: 2019-04-11

## 2019-04-08 RX ORDER — ACETAMINOPHEN 325 MG/1
650 TABLET ORAL EVERY 6 HOURS PRN
Status: DISCONTINUED | OUTPATIENT
Start: 2019-04-08 | End: 2019-04-13 | Stop reason: HOSPADM

## 2019-04-08 RX ORDER — DIHYDROERGOTAMINE MESYLATE 1 MG/ML
1 INJECTION, SOLUTION INTRAMUSCULAR; INTRAVENOUS; SUBCUTANEOUS EVERY 8 HOURS SCHEDULED
Status: CANCELLED | OUTPATIENT
Start: 2019-04-08 | End: 2019-04-08

## 2019-04-08 RX ORDER — LORAZEPAM 1 MG/1
2 TABLET ORAL 3 TIMES DAILY
Status: DISCONTINUED | OUTPATIENT
Start: 2019-04-08 | End: 2019-04-13 | Stop reason: HOSPADM

## 2019-04-08 RX ORDER — PANTOPRAZOLE SODIUM 40 MG/1
40 TABLET, DELAYED RELEASE ORAL
Status: DISCONTINUED | OUTPATIENT
Start: 2019-04-09 | End: 2019-04-08 | Stop reason: HOSPADM

## 2019-04-08 RX ORDER — DOCUSATE SODIUM 100 MG/1
100 CAPSULE, LIQUID FILLED ORAL 2 TIMES DAILY
Status: DISCONTINUED | OUTPATIENT
Start: 2019-04-08 | End: 2019-04-13 | Stop reason: HOSPADM

## 2019-04-08 RX ORDER — METOCLOPRAMIDE HYDROCHLORIDE 5 MG/ML
10 INJECTION INTRAMUSCULAR; INTRAVENOUS EVERY 6 HOURS PRN
Status: DISCONTINUED | OUTPATIENT
Start: 2019-04-09 | End: 2019-04-09

## 2019-04-08 RX ORDER — POLYETHYLENE GLYCOL 3350 17 G/17G
17 POWDER, FOR SOLUTION ORAL DAILY PRN
Status: DISCONTINUED | OUTPATIENT
Start: 2019-04-08 | End: 2019-04-13 | Stop reason: HOSPADM

## 2019-04-08 RX ORDER — GABAPENTIN 300 MG/1
600 CAPSULE ORAL 3 TIMES DAILY
Status: CANCELLED | OUTPATIENT
Start: 2019-04-08

## 2019-04-08 RX ORDER — ONDANSETRON 4 MG/1
4 TABLET, ORALLY DISINTEGRATING ORAL EVERY 6 HOURS PRN
Status: CANCELLED | OUTPATIENT
Start: 2019-04-08

## 2019-04-08 RX ORDER — GABAPENTIN 300 MG/1
600 CAPSULE ORAL 3 TIMES DAILY
Status: DISCONTINUED | OUTPATIENT
Start: 2019-04-08 | End: 2019-04-13 | Stop reason: HOSPADM

## 2019-04-08 RX ORDER — LAMOTRIGINE 100 MG/1
200 TABLET ORAL DAILY
Status: CANCELLED | OUTPATIENT
Start: 2019-04-09

## 2019-04-08 RX ORDER — SCOLOPAMINE TRANSDERMAL SYSTEM 1 MG/1
1 PATCH, EXTENDED RELEASE TRANSDERMAL
Status: DISCONTINUED | OUTPATIENT
Start: 2019-04-09 | End: 2019-04-13 | Stop reason: HOSPADM

## 2019-04-08 RX ORDER — FLUVOXAMINE MALEATE 50 MG/1
300 TABLET, COATED ORAL
Status: DISCONTINUED | OUTPATIENT
Start: 2019-04-08 | End: 2019-04-13 | Stop reason: HOSPADM

## 2019-04-08 RX ADMIN — DIHYDROERGOTAMINE MESYLATE 1 MG: 1 INJECTION INTRAMUSCULAR; INTRAVENOUS; SUBCUTANEOUS at 21:39

## 2019-04-08 RX ADMIN — ONDANSETRON 4 MG: 4 TABLET, ORALLY DISINTEGRATING ORAL at 04:20

## 2019-04-08 RX ADMIN — METOCLOPRAMIDE 10 MG: 5 INJECTION, SOLUTION INTRAMUSCULAR; INTRAVENOUS at 21:35

## 2019-04-08 RX ADMIN — GABAPENTIN 600 MG: 300 CAPSULE ORAL at 08:15

## 2019-04-08 RX ADMIN — FLUVOXAMINE MALEATE 300 MG: 50 TABLET, FILM COATED ORAL at 21:36

## 2019-04-08 RX ADMIN — DIHYDROERGOTAMINE MESYLATE 1 MG: 1 INJECTION INTRAMUSCULAR; INTRAVENOUS; SUBCUTANEOUS at 15:51

## 2019-04-08 RX ADMIN — ZONISAMIDE 200 MG: 100 CAPSULE ORAL at 09:05

## 2019-04-08 RX ADMIN — METOCLOPRAMIDE 10 MG: 5 INJECTION, SOLUTION INTRAMUSCULAR; INTRAVENOUS at 05:37

## 2019-04-08 RX ADMIN — LORAZEPAM 2 MG: 1 TABLET ORAL at 08:16

## 2019-04-08 RX ADMIN — PANTOPRAZOLE SODIUM 40 MG: 40 INJECTION, POWDER, FOR SOLUTION INTRAVENOUS at 09:05

## 2019-04-08 RX ADMIN — LORAZEPAM 2 MG: 1 TABLET ORAL at 20:33

## 2019-04-08 RX ADMIN — METHOCARBAMOL 1000 MG: 500 TABLET, FILM COATED ORAL at 20:32

## 2019-04-08 RX ADMIN — HEPARIN SODIUM 5000 UNITS: 5000 INJECTION INTRAVENOUS; SUBCUTANEOUS at 15:50

## 2019-04-08 RX ADMIN — LORAZEPAM 2 MG: 1 TABLET ORAL at 16:00

## 2019-04-08 RX ADMIN — LAMOTRIGINE 200 MG: 100 TABLET ORAL at 08:15

## 2019-04-08 RX ADMIN — DOCUSATE SODIUM 100 MG: 100 CAPSULE, LIQUID FILLED ORAL at 20:54

## 2019-04-08 RX ADMIN — METOCLOPRAMIDE 10 MG: 5 INJECTION, SOLUTION INTRAMUSCULAR; INTRAVENOUS at 15:50

## 2019-04-08 RX ADMIN — SODIUM CHLORIDE 1000 MG: 0.9 INJECTION, SOLUTION INTRAVENOUS at 08:18

## 2019-04-08 RX ADMIN — ONDANSETRON 4 MG: 4 TABLET, ORALLY DISINTEGRATING ORAL at 10:36

## 2019-04-08 RX ADMIN — DIHYDROERGOTAMINE MESYLATE 1 MG: 1 INJECTION INTRAMUSCULAR; INTRAVENOUS; SUBCUTANEOUS at 06:43

## 2019-04-08 RX ADMIN — HEPARIN SODIUM 5000 UNITS: 5000 INJECTION INTRAVENOUS; SUBCUTANEOUS at 21:36

## 2019-04-08 RX ADMIN — HEPARIN SODIUM 5000 UNITS: 5000 INJECTION INTRAVENOUS; SUBCUTANEOUS at 05:36

## 2019-04-08 RX ADMIN — ACETAMINOPHEN 650 MG: 325 TABLET ORAL at 20:32

## 2019-04-08 RX ADMIN — MAGNESIUM SULFATE HEPTAHYDRATE 2 G: 40 INJECTION, SOLUTION INTRAVENOUS at 09:56

## 2019-04-08 RX ADMIN — GABAPENTIN 600 MG: 300 CAPSULE ORAL at 20:33

## 2019-04-08 RX ADMIN — VALPROATE SODIUM 1000 MG: 100 INJECTION, SOLUTION INTRAVENOUS at 11:25

## 2019-04-08 RX ADMIN — ACETAMINOPHEN 650 MG: 325 TABLET ORAL at 10:38

## 2019-04-08 RX ADMIN — GABAPENTIN 600 MG: 300 CAPSULE ORAL at 16:00

## 2019-04-08 RX ADMIN — ARIPIPRAZOLE 15 MG: 5 TABLET ORAL at 08:15

## 2019-04-08 RX ADMIN — ONDANSETRON 4 MG: 4 TABLET, ORALLY DISINTEGRATING ORAL at 20:33

## 2019-04-08 RX ADMIN — DIPHENHYDRAMINE HCL 25 MG: 25 TABLET, FILM COATED ORAL at 22:44

## 2019-04-09 PROCEDURE — 94762 N-INVAS EAR/PLS OXIMTRY CONT: CPT

## 2019-04-09 PROCEDURE — 99233 SBSQ HOSP IP/OBS HIGH 50: CPT | Performed by: PHYSICIAN ASSISTANT

## 2019-04-09 PROCEDURE — 99231 SBSQ HOSP IP/OBS SF/LOW 25: CPT | Performed by: PHYSICIAN ASSISTANT

## 2019-04-09 PROCEDURE — 94760 N-INVAS EAR/PLS OXIMETRY 1: CPT

## 2019-04-09 RX ORDER — GLYCOPYRROLATE 0.2 MG/ML
0.2 INJECTION INTRAMUSCULAR; INTRAVENOUS EVERY 4 HOURS PRN
Status: DISCONTINUED | OUTPATIENT
Start: 2019-04-09 | End: 2019-04-13

## 2019-04-09 RX ORDER — HALOPERIDOL 5 MG/ML
2 INJECTION INTRAMUSCULAR ONCE
Status: DISCONTINUED | OUTPATIENT
Start: 2019-04-09 | End: 2019-04-09

## 2019-04-09 RX ORDER — METOCLOPRAMIDE HYDROCHLORIDE 5 MG/ML
10 INJECTION INTRAMUSCULAR; INTRAVENOUS EVERY 6 HOURS PRN
Status: DISCONTINUED | OUTPATIENT
Start: 2019-04-09 | End: 2019-04-11

## 2019-04-09 RX ORDER — HALOPERIDOL 5 MG/ML
2 INJECTION INTRAMUSCULAR AS NEEDED
Status: DISCONTINUED | OUTPATIENT
Start: 2019-04-09 | End: 2019-04-12

## 2019-04-09 RX ORDER — LANOLIN ALCOHOL/MO/W.PET/CERES
6 CREAM (GRAM) TOPICAL
Status: DISCONTINUED | OUTPATIENT
Start: 2019-04-09 | End: 2019-04-13 | Stop reason: HOSPADM

## 2019-04-09 RX ORDER — LORAZEPAM 2 MG/ML
1 INJECTION INTRAMUSCULAR EVERY 6 HOURS PRN
Status: DISCONTINUED | OUTPATIENT
Start: 2019-04-09 | End: 2019-04-12

## 2019-04-09 RX ORDER — TRAMADOL HYDROCHLORIDE 50 MG/1
50 TABLET ORAL EVERY 4 HOURS PRN
Status: COMPLETED | OUTPATIENT
Start: 2019-04-09 | End: 2019-04-12

## 2019-04-09 RX ADMIN — PANTOPRAZOLE SODIUM 40 MG: 40 TABLET, DELAYED RELEASE ORAL at 08:49

## 2019-04-09 RX ADMIN — LORAZEPAM 2 MG: 1 TABLET ORAL at 08:48

## 2019-04-09 RX ADMIN — SCOPALAMINE 1 PATCH: 1 PATCH, EXTENDED RELEASE TRANSDERMAL at 20:47

## 2019-04-09 RX ADMIN — LAMOTRIGINE 200 MG: 100 TABLET ORAL at 08:49

## 2019-04-09 RX ADMIN — LORAZEPAM 2 MG: 1 TABLET ORAL at 20:46

## 2019-04-09 RX ADMIN — HEPARIN SODIUM 5000 UNITS: 5000 INJECTION INTRAVENOUS; SUBCUTANEOUS at 13:50

## 2019-04-09 RX ADMIN — DOCUSATE SODIUM 100 MG: 100 CAPSULE, LIQUID FILLED ORAL at 16:23

## 2019-04-09 RX ADMIN — ZONISAMIDE 200 MG: 100 CAPSULE ORAL at 08:49

## 2019-04-09 RX ADMIN — MELATONIN 6 MG: at 00:51

## 2019-04-09 RX ADMIN — METOCLOPRAMIDE 10 MG: 5 INJECTION, SOLUTION INTRAMUSCULAR; INTRAVENOUS at 01:04

## 2019-04-09 RX ADMIN — SODIUM CHLORIDE 0.1 MG/KG/HR: 0.9 INJECTION, SOLUTION INTRAVENOUS at 12:17

## 2019-04-09 RX ADMIN — METOCLOPRAMIDE 10 MG: 5 INJECTION, SOLUTION INTRAMUSCULAR; INTRAVENOUS at 08:49

## 2019-04-09 RX ADMIN — HEPARIN SODIUM 5000 UNITS: 5000 INJECTION INTRAVENOUS; SUBCUTANEOUS at 05:52

## 2019-04-09 RX ADMIN — TRAMADOL HYDROCHLORIDE 50 MG: 50 TABLET, COATED ORAL at 00:51

## 2019-04-09 RX ADMIN — METOCLOPRAMIDE 10 MG: 5 INJECTION, SOLUTION INTRAMUSCULAR; INTRAVENOUS at 05:51

## 2019-04-09 RX ADMIN — ACETAMINOPHEN 650 MG: 325 TABLET ORAL at 02:30

## 2019-04-09 RX ADMIN — GABAPENTIN 600 MG: 300 CAPSULE ORAL at 08:48

## 2019-04-09 RX ADMIN — GABAPENTIN 600 MG: 300 CAPSULE ORAL at 20:46

## 2019-04-09 RX ADMIN — ARIPIPRAZOLE 15 MG: 10 TABLET ORAL at 08:48

## 2019-04-09 RX ADMIN — GABAPENTIN 600 MG: 300 CAPSULE ORAL at 16:23

## 2019-04-09 RX ADMIN — FLUVOXAMINE MALEATE 300 MG: 50 TABLET, FILM COATED ORAL at 20:49

## 2019-04-09 RX ADMIN — METOCLOPRAMIDE 10 MG: 5 INJECTION, SOLUTION INTRAMUSCULAR; INTRAVENOUS at 20:48

## 2019-04-09 RX ADMIN — MELATONIN 6 MG: at 20:48

## 2019-04-09 RX ADMIN — METOCLOPRAMIDE 10 MG: 5 INJECTION, SOLUTION INTRAMUSCULAR; INTRAVENOUS at 13:50

## 2019-04-09 RX ADMIN — ONDANSETRON 4 MG: 4 TABLET, ORALLY DISINTEGRATING ORAL at 20:47

## 2019-04-09 RX ADMIN — LORAZEPAM 2 MG: 1 TABLET ORAL at 16:23

## 2019-04-09 RX ADMIN — DOCUSATE SODIUM 100 MG: 100 CAPSULE, LIQUID FILLED ORAL at 08:49

## 2019-04-09 RX ADMIN — HEPARIN SODIUM 5000 UNITS: 5000 INJECTION INTRAVENOUS; SUBCUTANEOUS at 20:47

## 2019-04-10 PROBLEM — D72.829 LEUKOCYTOSIS: Status: ACTIVE | Noted: 2019-04-10

## 2019-04-10 PROBLEM — Z86.718 HISTORY OF DVT (DEEP VEIN THROMBOSIS): Status: ACTIVE | Noted: 2019-04-10

## 2019-04-10 PROCEDURE — 94762 N-INVAS EAR/PLS OXIMTRY CONT: CPT

## 2019-04-10 PROCEDURE — 99232 SBSQ HOSP IP/OBS MODERATE 35: CPT | Performed by: PSYCHIATRY & NEUROLOGY

## 2019-04-10 PROCEDURE — 99232 SBSQ HOSP IP/OBS MODERATE 35: CPT | Performed by: NURSE PRACTITIONER

## 2019-04-10 RX ORDER — SODIUM CHLORIDE 9 MG/ML
50 INJECTION, SOLUTION INTRAVENOUS CONTINUOUS
Status: DISCONTINUED | OUTPATIENT
Start: 2019-04-10 | End: 2019-04-13

## 2019-04-10 RX ADMIN — ARIPIPRAZOLE 15 MG: 10 TABLET ORAL at 08:49

## 2019-04-10 RX ADMIN — FLUVOXAMINE MALEATE 300 MG: 50 TABLET, FILM COATED ORAL at 21:54

## 2019-04-10 RX ADMIN — GABAPENTIN 600 MG: 300 CAPSULE ORAL at 21:55

## 2019-04-10 RX ADMIN — LAMOTRIGINE 200 MG: 100 TABLET ORAL at 08:49

## 2019-04-10 RX ADMIN — SODIUM CHLORIDE 0.4 MG/KG/HR: 0.9 INJECTION, SOLUTION INTRAVENOUS at 18:37

## 2019-04-10 RX ADMIN — ZONISAMIDE 200 MG: 100 CAPSULE ORAL at 08:50

## 2019-04-10 RX ADMIN — DOCUSATE SODIUM 100 MG: 100 CAPSULE, LIQUID FILLED ORAL at 08:49

## 2019-04-10 RX ADMIN — ACETAMINOPHEN 650 MG: 325 TABLET ORAL at 16:37

## 2019-04-10 RX ADMIN — TRAMADOL HYDROCHLORIDE 50 MG: 50 TABLET, COATED ORAL at 21:54

## 2019-04-10 RX ADMIN — HEPARIN SODIUM 5000 UNITS: 5000 INJECTION INTRAVENOUS; SUBCUTANEOUS at 05:07

## 2019-04-10 RX ADMIN — ONDANSETRON 4 MG: 4 TABLET, ORALLY DISINTEGRATING ORAL at 13:53

## 2019-04-10 RX ADMIN — PANTOPRAZOLE SODIUM 40 MG: 40 TABLET, DELAYED RELEASE ORAL at 08:49

## 2019-04-10 RX ADMIN — METOCLOPRAMIDE 10 MG: 5 INJECTION, SOLUTION INTRAMUSCULAR; INTRAVENOUS at 18:39

## 2019-04-10 RX ADMIN — SODIUM CHLORIDE 75 ML/HR: 0.9 INJECTION, SOLUTION INTRAVENOUS at 18:11

## 2019-04-10 RX ADMIN — LORAZEPAM 2 MG: 1 TABLET ORAL at 08:49

## 2019-04-10 RX ADMIN — MELATONIN 6 MG: at 21:55

## 2019-04-10 RX ADMIN — HEPARIN SODIUM 5000 UNITS: 5000 INJECTION INTRAVENOUS; SUBCUTANEOUS at 13:29

## 2019-04-10 RX ADMIN — LORAZEPAM 2 MG: 1 TABLET ORAL at 21:55

## 2019-04-10 RX ADMIN — GABAPENTIN 600 MG: 300 CAPSULE ORAL at 16:41

## 2019-04-10 RX ADMIN — SODIUM CHLORIDE 0.2 MG/KG/HR: 0.9 INJECTION, SOLUTION INTRAVENOUS at 01:02

## 2019-04-10 RX ADMIN — METOCLOPRAMIDE 10 MG: 5 INJECTION, SOLUTION INTRAMUSCULAR; INTRAVENOUS at 08:52

## 2019-04-10 RX ADMIN — LORAZEPAM 2 MG: 1 TABLET ORAL at 16:41

## 2019-04-10 RX ADMIN — GABAPENTIN 600 MG: 300 CAPSULE ORAL at 08:49

## 2019-04-10 RX ADMIN — SODIUM CHLORIDE 0.3 MG/KG/HR: 0.9 INJECTION, SOLUTION INTRAVENOUS at 12:36

## 2019-04-10 RX ADMIN — HEPARIN SODIUM 5000 UNITS: 5000 INJECTION INTRAVENOUS; SUBCUTANEOUS at 21:55

## 2019-04-10 RX ADMIN — DOCUSATE SODIUM 100 MG: 100 CAPSULE, LIQUID FILLED ORAL at 16:41

## 2019-04-11 ENCOUNTER — APPOINTMENT (INPATIENT)
Dept: RADIOLOGY | Facility: HOSPITAL | Age: 59
DRG: 103 | End: 2019-04-11
Payer: COMMERCIAL

## 2019-04-11 PROBLEM — D64.9 ANEMIA: Status: ACTIVE | Noted: 2019-04-11

## 2019-04-11 LAB
ANION GAP SERPL CALCULATED.3IONS-SCNC: 4 MMOL/L (ref 4–13)
BACTERIA UR QL AUTO: ABNORMAL /HPF
BILIRUB UR QL STRIP: NEGATIVE
BUN SERPL-MCNC: 16 MG/DL (ref 5–25)
CALCIUM SERPL-MCNC: 7.7 MG/DL (ref 8.3–10.1)
CHLORIDE SERPL-SCNC: 113 MMOL/L (ref 100–108)
CLARITY UR: CLEAR
CO2 SERPL-SCNC: 28 MMOL/L (ref 21–32)
COLOR UR: YELLOW
CREAT SERPL-MCNC: 0.78 MG/DL (ref 0.6–1.3)
ERYTHROCYTE [DISTWIDTH] IN BLOOD BY AUTOMATED COUNT: 17.7 % (ref 11.6–15.1)
FERRITIN SERPL-MCNC: 12 NG/ML (ref 8–388)
FOLATE SERPL-MCNC: 13.3 NG/ML (ref 3.1–17.5)
GFR SERPL CREATININE-BSD FRML MDRD: 83 ML/MIN/1.73SQ M
GLUCOSE SERPL-MCNC: 82 MG/DL (ref 65–140)
GLUCOSE UR STRIP-MCNC: NEGATIVE MG/DL
HCT VFR BLD AUTO: 29.2 % (ref 34.8–46.1)
HCT VFR BLD AUTO: 29.4 % (ref 34.8–46.1)
HGB BLD-MCNC: 8.7 G/DL (ref 11.5–15.4)
HGB BLD-MCNC: 8.9 G/DL (ref 11.5–15.4)
HGB UR QL STRIP.AUTO: NEGATIVE
HYALINE CASTS #/AREA URNS LPF: ABNORMAL /LPF
IRON SATN MFR SERPL: 7 %
IRON SERPL-MCNC: 27 UG/DL (ref 50–170)
KETONES UR STRIP-MCNC: NEGATIVE MG/DL
LEUKOCYTE ESTERASE UR QL STRIP: ABNORMAL
MAGNESIUM SERPL-MCNC: 2.3 MG/DL (ref 1.6–2.6)
MCH RBC QN AUTO: 24.3 PG (ref 26.8–34.3)
MCHC RBC AUTO-ENTMCNC: 29.6 G/DL (ref 31.4–37.4)
MCV RBC AUTO: 82 FL (ref 82–98)
NITRITE UR QL STRIP: NEGATIVE
NON-SQ EPI CELLS URNS QL MICRO: ABNORMAL /HPF
PH UR STRIP.AUTO: 6.5 [PH]
PLATELET # BLD AUTO: 293 THOUSANDS/UL (ref 149–390)
PMV BLD AUTO: 11 FL (ref 8.9–12.7)
POTASSIUM SERPL-SCNC: 3.8 MMOL/L (ref 3.5–5.3)
PROCALCITONIN SERPL-MCNC: <0.05 NG/ML
PROT UR STRIP-MCNC: NEGATIVE MG/DL
RBC # BLD AUTO: 3.58 MILLION/UL (ref 3.81–5.12)
RBC #/AREA URNS AUTO: ABNORMAL /HPF
SODIUM SERPL-SCNC: 145 MMOL/L (ref 136–145)
SP GR UR STRIP.AUTO: 1.02 (ref 1–1.03)
TIBC SERPL-MCNC: 396 UG/DL (ref 250–450)
UROBILINOGEN UR QL STRIP.AUTO: 1 E.U./DL
VIT B12 SERPL-MCNC: 300 PG/ML (ref 100–900)
WBC # BLD AUTO: 9.13 THOUSAND/UL (ref 4.31–10.16)
WBC #/AREA URNS AUTO: ABNORMAL /HPF

## 2019-04-11 PROCEDURE — 83540 ASSAY OF IRON: CPT | Performed by: NURSE PRACTITIONER

## 2019-04-11 PROCEDURE — 71045 X-RAY EXAM CHEST 1 VIEW: CPT

## 2019-04-11 PROCEDURE — 94760 N-INVAS EAR/PLS OXIMETRY 1: CPT

## 2019-04-11 PROCEDURE — 83735 ASSAY OF MAGNESIUM: CPT | Performed by: NURSE PRACTITIONER

## 2019-04-11 PROCEDURE — 83550 IRON BINDING TEST: CPT | Performed by: NURSE PRACTITIONER

## 2019-04-11 PROCEDURE — 99232 SBSQ HOSP IP/OBS MODERATE 35: CPT | Performed by: NURSE PRACTITIONER

## 2019-04-11 PROCEDURE — 84145 PROCALCITONIN (PCT): CPT | Performed by: NURSE PRACTITIONER

## 2019-04-11 PROCEDURE — 85014 HEMATOCRIT: CPT | Performed by: NURSE PRACTITIONER

## 2019-04-11 PROCEDURE — 85018 HEMOGLOBIN: CPT | Performed by: NURSE PRACTITIONER

## 2019-04-11 PROCEDURE — 85027 COMPLETE CBC AUTOMATED: CPT | Performed by: NURSE PRACTITIONER

## 2019-04-11 PROCEDURE — 81001 URINALYSIS AUTO W/SCOPE: CPT | Performed by: NURSE PRACTITIONER

## 2019-04-11 PROCEDURE — 82607 VITAMIN B-12: CPT | Performed by: NURSE PRACTITIONER

## 2019-04-11 PROCEDURE — 82728 ASSAY OF FERRITIN: CPT | Performed by: NURSE PRACTITIONER

## 2019-04-11 PROCEDURE — 80048 BASIC METABOLIC PNL TOTAL CA: CPT | Performed by: NURSE PRACTITIONER

## 2019-04-11 PROCEDURE — 94762 N-INVAS EAR/PLS OXIMTRY CONT: CPT | Performed by: SOCIAL WORKER

## 2019-04-11 PROCEDURE — 82746 ASSAY OF FOLIC ACID SERUM: CPT | Performed by: NURSE PRACTITIONER

## 2019-04-11 RX ORDER — HALOPERIDOL 5 MG/ML
2 INJECTION INTRAMUSCULAR EVERY 8 HOURS PRN
Status: DISCONTINUED | OUTPATIENT
Start: 2019-04-11 | End: 2019-04-12

## 2019-04-11 RX ORDER — LORAZEPAM 2 MG/ML
1 INJECTION INTRAMUSCULAR EVERY 6 HOURS PRN
Status: DISCONTINUED | OUTPATIENT
Start: 2019-04-11 | End: 2019-04-12

## 2019-04-11 RX ORDER — BACLOFEN 10 MG/1
10 TABLET ORAL
Status: DISCONTINUED | OUTPATIENT
Start: 2019-04-11 | End: 2019-04-13 | Stop reason: HOSPADM

## 2019-04-11 RX ORDER — METHOCARBAMOL 500 MG/1
1000 TABLET, FILM COATED ORAL 2 TIMES DAILY
Status: DISCONTINUED | OUTPATIENT
Start: 2019-04-11 | End: 2019-04-13 | Stop reason: HOSPADM

## 2019-04-11 RX ORDER — ONDANSETRON 2 MG/ML
4 INJECTION INTRAMUSCULAR; INTRAVENOUS ONCE
Status: DISCONTINUED | OUTPATIENT
Start: 2019-04-11 | End: 2019-04-13 | Stop reason: HOSPADM

## 2019-04-11 RX ORDER — GLYCOPYRROLATE 0.2 MG/ML
0.2 INJECTION INTRAMUSCULAR; INTRAVENOUS EVERY 4 HOURS PRN
Status: DISCONTINUED | OUTPATIENT
Start: 2019-04-11 | End: 2019-04-13

## 2019-04-11 RX ORDER — PROMETHAZINE HYDROCHLORIDE 25 MG/1
25 TABLET ORAL EVERY 8 HOURS SCHEDULED
Status: DISCONTINUED | OUTPATIENT
Start: 2019-04-11 | End: 2019-04-13 | Stop reason: HOSPADM

## 2019-04-11 RX ADMIN — BACLOFEN 10 MG: 10 TABLET ORAL at 22:10

## 2019-04-11 RX ADMIN — ONDANSETRON 4 MG: 4 TABLET, ORALLY DISINTEGRATING ORAL at 05:54

## 2019-04-11 RX ADMIN — PROMETHAZINE HYDROCHLORIDE 25 MG: 25 TABLET ORAL at 22:09

## 2019-04-11 RX ADMIN — HEPARIN SODIUM 5000 UNITS: 5000 INJECTION INTRAVENOUS; SUBCUTANEOUS at 14:21

## 2019-04-11 RX ADMIN — HEPARIN SODIUM 5000 UNITS: 5000 INJECTION INTRAVENOUS; SUBCUTANEOUS at 05:54

## 2019-04-11 RX ADMIN — ARIPIPRAZOLE 15 MG: 10 TABLET ORAL at 09:01

## 2019-04-11 RX ADMIN — GABAPENTIN 600 MG: 300 CAPSULE ORAL at 22:09

## 2019-04-11 RX ADMIN — SODIUM CHLORIDE 0.4 MG/KG/HR: 0.9 INJECTION, SOLUTION INTRAVENOUS at 06:51

## 2019-04-11 RX ADMIN — SODIUM CHLORIDE 75 ML/HR: 0.9 INJECTION, SOLUTION INTRAVENOUS at 07:19

## 2019-04-11 RX ADMIN — MELATONIN 6 MG: at 22:09

## 2019-04-11 RX ADMIN — ZONISAMIDE 200 MG: 100 CAPSULE ORAL at 09:02

## 2019-04-11 RX ADMIN — SODIUM CHLORIDE 0.4 MG/KG/HR: 0.9 INJECTION, SOLUTION INTRAVENOUS at 00:48

## 2019-04-11 RX ADMIN — PROMETHAZINE HYDROCHLORIDE 25 MG: 25 TABLET ORAL at 14:18

## 2019-04-11 RX ADMIN — GABAPENTIN 600 MG: 300 CAPSULE ORAL at 17:06

## 2019-04-11 RX ADMIN — LORAZEPAM 2 MG: 1 TABLET ORAL at 17:06

## 2019-04-11 RX ADMIN — METOCLOPRAMIDE 10 MG: 5 INJECTION, SOLUTION INTRAMUSCULAR; INTRAVENOUS at 09:02

## 2019-04-11 RX ADMIN — METHOCARBAMOL 1000 MG: 500 TABLET, FILM COATED ORAL at 14:18

## 2019-04-11 RX ADMIN — GABAPENTIN 600 MG: 300 CAPSULE ORAL at 09:02

## 2019-04-11 RX ADMIN — LORAZEPAM 2 MG: 1 TABLET ORAL at 22:09

## 2019-04-11 RX ADMIN — METOCLOPRAMIDE 10 MG: 5 INJECTION, SOLUTION INTRAMUSCULAR; INTRAVENOUS at 12:05

## 2019-04-11 RX ADMIN — HEPARIN SODIUM 5000 UNITS: 5000 INJECTION INTRAVENOUS; SUBCUTANEOUS at 22:10

## 2019-04-11 RX ADMIN — DOCUSATE SODIUM 100 MG: 100 CAPSULE, LIQUID FILLED ORAL at 09:01

## 2019-04-11 RX ADMIN — LORAZEPAM 2 MG: 1 TABLET ORAL at 09:01

## 2019-04-11 RX ADMIN — SODIUM CHLORIDE 0.4 MG/KG/HR: 0.9 INJECTION, SOLUTION INTRAVENOUS at 13:06

## 2019-04-11 RX ADMIN — DOCUSATE SODIUM 100 MG: 100 CAPSULE, LIQUID FILLED ORAL at 17:06

## 2019-04-11 RX ADMIN — PANTOPRAZOLE SODIUM 40 MG: 40 TABLET, DELAYED RELEASE ORAL at 06:01

## 2019-04-11 RX ADMIN — LAMOTRIGINE 200 MG: 100 TABLET ORAL at 09:01

## 2019-04-11 RX ADMIN — FLUVOXAMINE MALEATE 300 MG: 50 TABLET, FILM COATED ORAL at 22:09

## 2019-04-12 LAB
ANION GAP SERPL CALCULATED.3IONS-SCNC: 3 MMOL/L (ref 4–13)
ANISOCYTOSIS BLD QL SMEAR: PRESENT
BASOPHILS # BLD MANUAL: 0 THOUSAND/UL (ref 0–0.1)
BASOPHILS NFR MAR MANUAL: 0 % (ref 0–1)
BUN SERPL-MCNC: 12 MG/DL (ref 5–25)
CALCIUM SERPL-MCNC: 7.9 MG/DL (ref 8.3–10.1)
CHLORIDE SERPL-SCNC: 111 MMOL/L (ref 100–108)
CO2 SERPL-SCNC: 28 MMOL/L (ref 21–32)
CREAT SERPL-MCNC: 0.75 MG/DL (ref 0.6–1.3)
EOSINOPHIL # BLD MANUAL: 0.54 THOUSAND/UL (ref 0–0.4)
EOSINOPHIL NFR BLD MANUAL: 5 % (ref 0–6)
ERYTHROCYTE [DISTWIDTH] IN BLOOD BY AUTOMATED COUNT: 17.9 % (ref 11.6–15.1)
GFR SERPL CREATININE-BSD FRML MDRD: 87 ML/MIN/1.73SQ M
GLUCOSE SERPL-MCNC: 80 MG/DL (ref 65–140)
HCT VFR BLD AUTO: 29.8 % (ref 34.8–46.1)
HGB BLD-MCNC: 8.9 G/DL (ref 11.5–15.4)
HYPERCHROMIA BLD QL SMEAR: PRESENT
LYMPHOCYTES # BLD AUTO: 19 % (ref 14–44)
LYMPHOCYTES # BLD AUTO: 2.03 THOUSAND/UL (ref 0.6–4.47)
MCH RBC QN AUTO: 24.3 PG (ref 26.8–34.3)
MCHC RBC AUTO-ENTMCNC: 29.9 G/DL (ref 31.4–37.4)
MCV RBC AUTO: 81 FL (ref 82–98)
METAMYELOCYTES NFR BLD MANUAL: 2 % (ref 0–1)
MONOCYTES # BLD AUTO: 0.54 THOUSAND/UL (ref 0–1.22)
MONOCYTES NFR BLD: 5 % (ref 4–12)
MYELOCYTES NFR BLD MANUAL: 3 % (ref 0–1)
NEUTROPHILS # BLD MANUAL: 7.06 THOUSAND/UL (ref 1.85–7.62)
NEUTS SEG NFR BLD AUTO: 66 % (ref 43–75)
NRBC BLD AUTO-RTO: 0 /100 WBCS
NRBC BLD AUTO-RTO: 1 /100 WBC (ref 0–2)
PLATELET # BLD AUTO: 296 THOUSANDS/UL (ref 149–390)
PLATELET BLD QL SMEAR: ADEQUATE
PMV BLD AUTO: 10.9 FL (ref 8.9–12.7)
POIKILOCYTOSIS BLD QL SMEAR: PRESENT
POTASSIUM SERPL-SCNC: 3.8 MMOL/L (ref 3.5–5.3)
RBC # BLD AUTO: 3.66 MILLION/UL (ref 3.81–5.12)
RBC MORPH BLD: PRESENT
SODIUM SERPL-SCNC: 142 MMOL/L (ref 136–145)
T4 FREE SERPL-MCNC: 0.96 NG/DL (ref 0.76–1.46)
TSH SERPL DL<=0.05 MIU/L-ACNC: 1.09 UIU/ML (ref 0.36–3.74)
WBC # BLD AUTO: 10.7 THOUSAND/UL (ref 4.31–10.16)

## 2019-04-12 PROCEDURE — 85007 BL SMEAR W/DIFF WBC COUNT: CPT | Performed by: NURSE PRACTITIONER

## 2019-04-12 PROCEDURE — G8978 MOBILITY CURRENT STATUS: HCPCS

## 2019-04-12 PROCEDURE — G8979 MOBILITY GOAL STATUS: HCPCS

## 2019-04-12 PROCEDURE — 84439 ASSAY OF FREE THYROXINE: CPT | Performed by: NURSE PRACTITIONER

## 2019-04-12 PROCEDURE — 84443 ASSAY THYROID STIM HORMONE: CPT | Performed by: NURSE PRACTITIONER

## 2019-04-12 PROCEDURE — 99232 SBSQ HOSP IP/OBS MODERATE 35: CPT | Performed by: NURSE PRACTITIONER

## 2019-04-12 PROCEDURE — 85027 COMPLETE CBC AUTOMATED: CPT | Performed by: NURSE PRACTITIONER

## 2019-04-12 PROCEDURE — 97163 PT EVAL HIGH COMPLEX 45 MIN: CPT

## 2019-04-12 PROCEDURE — 80048 BASIC METABOLIC PNL TOTAL CA: CPT | Performed by: NURSE PRACTITIONER

## 2019-04-12 PROCEDURE — 99233 SBSQ HOSP IP/OBS HIGH 50: CPT | Performed by: PSYCHIATRY & NEUROLOGY

## 2019-04-12 RX ORDER — ONDANSETRON 2 MG/ML
4 INJECTION INTRAMUSCULAR; INTRAVENOUS ONCE
Status: DISCONTINUED | OUTPATIENT
Start: 2019-04-12 | End: 2019-04-13 | Stop reason: HOSPADM

## 2019-04-12 RX ORDER — DIHYDROERGOTAMINE MESYLATE 4 MG/ML
1 SPRAY NASAL AS NEEDED
Qty: 12 ML | Refills: 1 | Status: SHIPPED | OUTPATIENT
Start: 2019-04-12 | End: 2019-07-06

## 2019-04-12 RX ORDER — BACLOFEN 10 MG/1
10 TABLET ORAL
Qty: 30 TABLET | Refills: 0
Start: 2019-04-12 | End: 2019-04-13

## 2019-04-12 RX ADMIN — FLUVOXAMINE MALEATE 300 MG: 50 TABLET, FILM COATED ORAL at 22:34

## 2019-04-12 RX ADMIN — LORAZEPAM 2 MG: 1 TABLET ORAL at 17:33

## 2019-04-12 RX ADMIN — ZINC 1 TABLET: TAB ORAL at 09:15

## 2019-04-12 RX ADMIN — BACLOFEN 10 MG: 10 TABLET ORAL at 22:33

## 2019-04-12 RX ADMIN — HEPARIN SODIUM 5000 UNITS: 5000 INJECTION INTRAVENOUS; SUBCUTANEOUS at 06:11

## 2019-04-12 RX ADMIN — ARIPIPRAZOLE 15 MG: 10 TABLET ORAL at 09:16

## 2019-04-12 RX ADMIN — GABAPENTIN 600 MG: 300 CAPSULE ORAL at 22:33

## 2019-04-12 RX ADMIN — ACETAMINOPHEN 650 MG: 325 TABLET ORAL at 10:30

## 2019-04-12 RX ADMIN — GABAPENTIN 600 MG: 300 CAPSULE ORAL at 09:16

## 2019-04-12 RX ADMIN — PANTOPRAZOLE SODIUM 40 MG: 40 TABLET, DELAYED RELEASE ORAL at 06:11

## 2019-04-12 RX ADMIN — PROMETHAZINE HYDROCHLORIDE 25 MG: 25 TABLET ORAL at 22:34

## 2019-04-12 RX ADMIN — SCOPALAMINE 1 PATCH: 1 PATCH, EXTENDED RELEASE TRANSDERMAL at 22:35

## 2019-04-12 RX ADMIN — LORAZEPAM 2 MG: 1 TABLET ORAL at 22:33

## 2019-04-12 RX ADMIN — ZONISAMIDE 200 MG: 100 CAPSULE ORAL at 09:16

## 2019-04-12 RX ADMIN — METHOCARBAMOL 1000 MG: 500 TABLET, FILM COATED ORAL at 13:15

## 2019-04-12 RX ADMIN — DOCUSATE SODIUM 100 MG: 100 CAPSULE, LIQUID FILLED ORAL at 17:34

## 2019-04-12 RX ADMIN — TRAMADOL HYDROCHLORIDE 50 MG: 50 TABLET, COATED ORAL at 03:59

## 2019-04-12 RX ADMIN — PROMETHAZINE HYDROCHLORIDE 25 MG: 25 TABLET ORAL at 06:10

## 2019-04-12 RX ADMIN — HEPARIN SODIUM 5000 UNITS: 5000 INJECTION INTRAVENOUS; SUBCUTANEOUS at 22:32

## 2019-04-12 RX ADMIN — DOCUSATE SODIUM 100 MG: 100 CAPSULE, LIQUID FILLED ORAL at 09:16

## 2019-04-12 RX ADMIN — MELATONIN 6 MG: at 22:34

## 2019-04-12 RX ADMIN — HEPARIN SODIUM 5000 UNITS: 5000 INJECTION INTRAVENOUS; SUBCUTANEOUS at 13:15

## 2019-04-12 RX ADMIN — SODIUM CHLORIDE 50 ML/HR: 0.9 INJECTION, SOLUTION INTRAVENOUS at 03:15

## 2019-04-12 RX ADMIN — LORAZEPAM 2 MG: 1 TABLET ORAL at 09:16

## 2019-04-12 RX ADMIN — METHOCARBAMOL 1000 MG: 500 TABLET, FILM COATED ORAL at 09:16

## 2019-04-12 RX ADMIN — PROMETHAZINE HYDROCHLORIDE 25 MG: 25 TABLET ORAL at 13:15

## 2019-04-12 RX ADMIN — GABAPENTIN 600 MG: 300 CAPSULE ORAL at 17:33

## 2019-04-12 RX ADMIN — LAMOTRIGINE 200 MG: 100 TABLET ORAL at 09:16

## 2019-04-13 VITALS
OXYGEN SATURATION: 94 % | DIASTOLIC BLOOD PRESSURE: 66 MMHG | TEMPERATURE: 98.4 F | BODY MASS INDEX: 37.42 KG/M2 | SYSTOLIC BLOOD PRESSURE: 144 MMHG | HEIGHT: 68 IN | HEART RATE: 68 BPM | RESPIRATION RATE: 18 BRPM | WEIGHT: 246.91 LBS

## 2019-04-13 PROBLEM — R26.2 AMBULATORY DYSFUNCTION: Status: ACTIVE | Noted: 2019-04-13

## 2019-04-13 PROCEDURE — 99232 SBSQ HOSP IP/OBS MODERATE 35: CPT | Performed by: PSYCHIATRY & NEUROLOGY

## 2019-04-13 PROCEDURE — 99239 HOSP IP/OBS DSCHRG MGMT >30: CPT | Performed by: NURSE PRACTITIONER

## 2019-04-13 RX ORDER — ONDANSETRON 4 MG/1
4 TABLET, ORALLY DISINTEGRATING ORAL ONCE AS NEEDED
Status: COMPLETED | OUTPATIENT
Start: 2019-04-13 | End: 2019-04-13

## 2019-04-13 RX ORDER — BACLOFEN 10 MG/1
10 TABLET ORAL
Qty: 30 TABLET | Refills: 0 | Status: SHIPPED | OUTPATIENT
Start: 2019-04-13 | End: 2019-05-08

## 2019-04-13 RX ADMIN — ZINC 1 TABLET: TAB ORAL at 08:02

## 2019-04-13 RX ADMIN — METHOCARBAMOL 1000 MG: 500 TABLET, FILM COATED ORAL at 13:26

## 2019-04-13 RX ADMIN — LORAZEPAM 2 MG: 1 TABLET ORAL at 08:01

## 2019-04-13 RX ADMIN — GABAPENTIN 600 MG: 300 CAPSULE ORAL at 08:01

## 2019-04-13 RX ADMIN — ACETAMINOPHEN 650 MG: 325 TABLET ORAL at 08:11

## 2019-04-13 RX ADMIN — PROMETHAZINE HYDROCHLORIDE 25 MG: 25 TABLET ORAL at 05:06

## 2019-04-13 RX ADMIN — HEPARIN SODIUM 5000 UNITS: 5000 INJECTION INTRAVENOUS; SUBCUTANEOUS at 05:06

## 2019-04-13 RX ADMIN — PROMETHAZINE HYDROCHLORIDE 25 MG: 25 TABLET ORAL at 13:26

## 2019-04-13 RX ADMIN — DOCUSATE SODIUM 100 MG: 100 CAPSULE, LIQUID FILLED ORAL at 08:01

## 2019-04-13 RX ADMIN — ARIPIPRAZOLE 15 MG: 10 TABLET ORAL at 08:02

## 2019-04-13 RX ADMIN — POLYETHYLENE GLYCOL 3350 17 G: 17 POWDER, FOR SOLUTION ORAL at 08:11

## 2019-04-13 RX ADMIN — HEPARIN SODIUM 5000 UNITS: 5000 INJECTION INTRAVENOUS; SUBCUTANEOUS at 13:26

## 2019-04-13 RX ADMIN — ZONISAMIDE 200 MG: 100 CAPSULE ORAL at 08:01

## 2019-04-13 RX ADMIN — METHOCARBAMOL 1000 MG: 500 TABLET, FILM COATED ORAL at 08:01

## 2019-04-13 RX ADMIN — PANTOPRAZOLE SODIUM 40 MG: 40 TABLET, DELAYED RELEASE ORAL at 05:06

## 2019-04-13 RX ADMIN — LAMOTRIGINE 200 MG: 100 TABLET ORAL at 08:02

## 2019-04-13 RX ADMIN — ONDANSETRON 4 MG: 4 TABLET, ORALLY DISINTEGRATING ORAL at 02:49

## 2019-05-08 ENCOUNTER — APPOINTMENT (EMERGENCY)
Dept: CT IMAGING | Facility: HOSPITAL | Age: 59
End: 2019-05-08
Payer: COMMERCIAL

## 2019-05-08 ENCOUNTER — APPOINTMENT (EMERGENCY)
Dept: RADIOLOGY | Facility: HOSPITAL | Age: 59
End: 2019-05-08
Payer: COMMERCIAL

## 2019-05-08 ENCOUNTER — HOSPITAL ENCOUNTER (EMERGENCY)
Facility: HOSPITAL | Age: 59
Discharge: HOME | End: 2019-05-08
Attending: EMERGENCY MEDICINE | Admitting: EMERGENCY MEDICINE
Payer: COMMERCIAL

## 2019-05-08 VITALS
DIASTOLIC BLOOD PRESSURE: 63 MMHG | SYSTOLIC BLOOD PRESSURE: 130 MMHG | WEIGHT: 255.51 LBS | RESPIRATION RATE: 18 BRPM | HEART RATE: 81 BPM | OXYGEN SATURATION: 96 % | BODY MASS INDEX: 38.85 KG/M2 | TEMPERATURE: 97.9 F

## 2019-05-08 DIAGNOSIS — M25.552 LEFT HIP PAIN: Primary | ICD-10-CM

## 2019-05-08 PROCEDURE — 99284 EMERGENCY DEPT VISIT MOD MDM: CPT

## 2019-05-08 PROCEDURE — 70450 CT HEAD/BRAIN W/O DYE: CPT

## 2019-05-08 PROCEDURE — 73700 CT LOWER EXTREMITY W/O DYE: CPT

## 2019-05-08 PROCEDURE — 99284 EMERGENCY DEPT VISIT MOD MDM: CPT | Performed by: EMERGENCY MEDICINE

## 2019-05-08 PROCEDURE — 73502 X-RAY EXAM HIP UNI 2-3 VIEWS: CPT

## 2019-05-08 RX ORDER — OXYCODONE HYDROCHLORIDE AND ACETAMINOPHEN 5; 325 MG/1; MG/1
1 TABLET ORAL EVERY 4 HOURS PRN
Qty: 6 TABLET | Refills: 0 | Status: SHIPPED | OUTPATIENT
Start: 2019-05-08 | End: 2019-05-18

## 2019-05-08 RX ORDER — OXYCODONE HYDROCHLORIDE AND ACETAMINOPHEN 5; 325 MG/1; MG/1
1 TABLET ORAL ONCE
Status: COMPLETED | OUTPATIENT
Start: 2019-05-08 | End: 2019-05-08

## 2019-05-08 RX ADMIN — OXYCODONE HYDROCHLORIDE AND ACETAMINOPHEN 1 TABLET: 5; 325 TABLET ORAL at 19:02

## 2019-05-08 RX ADMIN — OXYCODONE HYDROCHLORIDE AND ACETAMINOPHEN 1 TABLET: 5; 325 TABLET ORAL at 17:14

## 2019-05-31 ENCOUNTER — APPOINTMENT (EMERGENCY)
Dept: NON INVASIVE DIAGNOSTICS | Facility: HOSPITAL | Age: 59
DRG: 103 | End: 2019-05-31
Payer: COMMERCIAL

## 2019-05-31 ENCOUNTER — HOSPITAL ENCOUNTER (INPATIENT)
Facility: HOSPITAL | Age: 59
LOS: 3 days | DRG: 103 | End: 2019-06-04
Attending: EMERGENCY MEDICINE | Admitting: INTERNAL MEDICINE
Payer: COMMERCIAL

## 2019-05-31 DIAGNOSIS — G43.919 INTRACTABLE MIGRAINE: Primary | ICD-10-CM

## 2019-05-31 LAB
ANION GAP SERPL CALCULATED.3IONS-SCNC: 10 MMOL/L (ref 4–13)
ATRIAL RATE: 81 BPM
BASOPHILS # BLD AUTO: 0.07 THOUSANDS/ΜL (ref 0–0.1)
BASOPHILS NFR BLD AUTO: 1 % (ref 0–1)
BUN SERPL-MCNC: 10 MG/DL (ref 5–25)
CALCIUM SERPL-MCNC: 9.5 MG/DL (ref 8.3–10.1)
CHLORIDE SERPL-SCNC: 111 MMOL/L (ref 100–108)
CO2 SERPL-SCNC: 22 MMOL/L (ref 21–32)
CREAT SERPL-MCNC: 0.91 MG/DL (ref 0.6–1.3)
EOSINOPHIL # BLD AUTO: 0.19 THOUSAND/ΜL (ref 0–0.61)
EOSINOPHIL NFR BLD AUTO: 3 % (ref 0–6)
ERYTHROCYTE [DISTWIDTH] IN BLOOD BY AUTOMATED COUNT: 20.4 % (ref 11.6–15.1)
GFR SERPL CREATININE-BSD FRML MDRD: 69 ML/MIN/1.73SQ M
GLUCOSE SERPL-MCNC: 108 MG/DL (ref 65–140)
HCT VFR BLD AUTO: 34.1 % (ref 34.8–46.1)
HGB BLD-MCNC: 10.2 G/DL (ref 11.5–15.4)
IMM GRANULOCYTES # BLD AUTO: 0.05 THOUSAND/UL (ref 0–0.2)
IMM GRANULOCYTES NFR BLD AUTO: 1 % (ref 0–2)
LYMPHOCYTES # BLD AUTO: 1.52 THOUSANDS/ΜL (ref 0.6–4.47)
LYMPHOCYTES NFR BLD AUTO: 20 % (ref 14–44)
MCH RBC QN AUTO: 25 PG (ref 26.8–34.3)
MCHC RBC AUTO-ENTMCNC: 29.9 G/DL (ref 31.4–37.4)
MCV RBC AUTO: 84 FL (ref 82–98)
MONOCYTES # BLD AUTO: 0.89 THOUSAND/ΜL (ref 0.17–1.22)
MONOCYTES NFR BLD AUTO: 12 % (ref 4–12)
NEUTROPHILS # BLD AUTO: 4.78 THOUSANDS/ΜL (ref 1.85–7.62)
NEUTS SEG NFR BLD AUTO: 63 % (ref 43–75)
NRBC BLD AUTO-RTO: 0 /100 WBCS
P AXIS: 22 DEGREES
PLATELET # BLD AUTO: 423 THOUSANDS/UL (ref 149–390)
PMV BLD AUTO: 9.5 FL (ref 8.9–12.7)
POTASSIUM SERPL-SCNC: 3.9 MMOL/L (ref 3.5–5.3)
PR INTERVAL: 160 MS
QRS AXIS: -16 DEGREES
QRSD INTERVAL: 86 MS
QT INTERVAL: 382 MS
QTC INTERVAL: 443 MS
RBC # BLD AUTO: 4.08 MILLION/UL (ref 3.81–5.12)
SODIUM SERPL-SCNC: 143 MMOL/L (ref 136–145)
T WAVE AXIS: -11 DEGREES
VENTRICULAR RATE: 81 BPM
WBC # BLD AUTO: 7.5 THOUSAND/UL (ref 4.31–10.16)

## 2019-05-31 PROCEDURE — 96366 THER/PROPH/DIAG IV INF ADDON: CPT

## 2019-05-31 PROCEDURE — 93971 EXTREMITY STUDY: CPT

## 2019-05-31 PROCEDURE — 93971 EXTREMITY STUDY: CPT | Performed by: SURGERY

## 2019-05-31 PROCEDURE — 96375 TX/PRO/DX INJ NEW DRUG ADDON: CPT

## 2019-05-31 PROCEDURE — 85025 COMPLETE CBC W/AUTO DIFF WBC: CPT | Performed by: PHYSICIAN ASSISTANT

## 2019-05-31 PROCEDURE — 99285 EMERGENCY DEPT VISIT HI MDM: CPT

## 2019-05-31 PROCEDURE — 36415 COLL VENOUS BLD VENIPUNCTURE: CPT | Performed by: PHYSICIAN ASSISTANT

## 2019-05-31 PROCEDURE — 93005 ELECTROCARDIOGRAM TRACING: CPT

## 2019-05-31 PROCEDURE — 99285 EMERGENCY DEPT VISIT HI MDM: CPT | Performed by: PHYSICIAN ASSISTANT

## 2019-05-31 PROCEDURE — 80048 BASIC METABOLIC PNL TOTAL CA: CPT | Performed by: PHYSICIAN ASSISTANT

## 2019-05-31 PROCEDURE — 96372 THER/PROPH/DIAG INJ SC/IM: CPT

## 2019-05-31 PROCEDURE — 99223 1ST HOSP IP/OBS HIGH 75: CPT | Performed by: PHYSICIAN ASSISTANT

## 2019-05-31 PROCEDURE — 93010 ELECTROCARDIOGRAM REPORT: CPT | Performed by: INTERNAL MEDICINE

## 2019-05-31 PROCEDURE — 96365 THER/PROPH/DIAG IV INF INIT: CPT

## 2019-05-31 RX ORDER — ZONISAMIDE 100 MG/1
200 CAPSULE ORAL DAILY
Status: DISCONTINUED | OUTPATIENT
Start: 2019-06-01 | End: 2019-06-04 | Stop reason: HOSPADM

## 2019-05-31 RX ORDER — HALOPERIDOL 5 MG
TABLET ORAL
COMMUNITY
Start: 2019-05-21 | End: 2019-10-20 | Stop reason: HOSPADM

## 2019-05-31 RX ORDER — ONDANSETRON 2 MG/ML
4 INJECTION INTRAMUSCULAR; INTRAVENOUS ONCE
Status: COMPLETED | OUTPATIENT
Start: 2019-05-31 | End: 2019-05-31

## 2019-05-31 RX ORDER — MAGNESIUM SULFATE HEPTAHYDRATE 40 MG/ML
2 INJECTION, SOLUTION INTRAVENOUS ONCE
Status: COMPLETED | OUTPATIENT
Start: 2019-05-31 | End: 2019-05-31

## 2019-05-31 RX ORDER — LAMOTRIGINE 100 MG/1
200 TABLET ORAL DAILY
Status: DISCONTINUED | OUTPATIENT
Start: 2019-06-01 | End: 2019-06-04 | Stop reason: HOSPADM

## 2019-05-31 RX ORDER — TRAMADOL HYDROCHLORIDE 50 MG/1
50 TABLET ORAL EVERY 6 HOURS PRN
Status: DISCONTINUED | OUTPATIENT
Start: 2019-05-31 | End: 2019-05-31

## 2019-05-31 RX ORDER — DIPHENHYDRAMINE HYDROCHLORIDE 50 MG/ML
25 INJECTION INTRAMUSCULAR; INTRAVENOUS EVERY 8 HOURS PRN
Status: DISCONTINUED | OUTPATIENT
Start: 2019-05-31 | End: 2019-06-03 | Stop reason: SDUPTHER

## 2019-05-31 RX ORDER — ESZOPICLONE 3 MG/1
3 TABLET, FILM COATED ORAL DAILY
Status: ON HOLD | COMMUNITY
End: 2020-06-27 | Stop reason: CLARIF

## 2019-05-31 RX ORDER — ZOLPIDEM TARTRATE 5 MG/1
2.5 TABLET ORAL
Status: DISCONTINUED | OUTPATIENT
Start: 2019-05-31 | End: 2019-06-02

## 2019-05-31 RX ORDER — MEXILETINE HYDROCHLORIDE 150 MG/1
150 CAPSULE ORAL 3 TIMES DAILY
COMMUNITY
Start: 2019-05-21 | End: 2019-07-06

## 2019-05-31 RX ORDER — TRAMADOL HYDROCHLORIDE 50 MG/1
50 TABLET ORAL EVERY 6 HOURS PRN
Status: DISCONTINUED | OUTPATIENT
Start: 2019-05-31 | End: 2019-06-01

## 2019-05-31 RX ORDER — LORAZEPAM 1 MG/1
2 TABLET ORAL 2 TIMES DAILY
Status: DISCONTINUED | OUTPATIENT
Start: 2019-05-31 | End: 2019-06-01

## 2019-05-31 RX ORDER — ONDANSETRON 2 MG/ML
4 INJECTION INTRAMUSCULAR; INTRAVENOUS EVERY 6 HOURS PRN
Status: DISCONTINUED | OUTPATIENT
Start: 2019-05-31 | End: 2019-06-04 | Stop reason: HOSPADM

## 2019-05-31 RX ORDER — GABAPENTIN 300 MG/1
600 CAPSULE ORAL 3 TIMES DAILY
Status: DISCONTINUED | OUTPATIENT
Start: 2019-05-31 | End: 2019-06-04 | Stop reason: HOSPADM

## 2019-05-31 RX ORDER — METOCLOPRAMIDE HYDROCHLORIDE 5 MG/ML
10 INJECTION INTRAMUSCULAR; INTRAVENOUS EVERY 8 HOURS SCHEDULED
Status: DISCONTINUED | OUTPATIENT
Start: 2019-05-31 | End: 2019-06-03

## 2019-05-31 RX ORDER — LIDOCAINE 50 MG/G
1 PATCH TOPICAL DAILY
Status: DISCONTINUED | OUTPATIENT
Start: 2019-06-01 | End: 2019-06-04 | Stop reason: HOSPADM

## 2019-05-31 RX ORDER — HALOPERIDOL 5 MG/ML
5 INJECTION INTRAMUSCULAR ONCE
Status: COMPLETED | OUTPATIENT
Start: 2019-05-31 | End: 2019-05-31

## 2019-05-31 RX ORDER — FLUVOXAMINE MALEATE 50 MG/1
300 TABLET, COATED ORAL
Status: DISCONTINUED | OUTPATIENT
Start: 2019-05-31 | End: 2019-06-04 | Stop reason: HOSPADM

## 2019-05-31 RX ORDER — DIPHENHYDRAMINE HYDROCHLORIDE 50 MG/ML
25 INJECTION INTRAMUSCULAR; INTRAVENOUS ONCE
Status: COMPLETED | OUTPATIENT
Start: 2019-05-31 | End: 2019-05-31

## 2019-05-31 RX ORDER — ACETAMINOPHEN 325 MG/1
650 TABLET ORAL EVERY 6 HOURS PRN
Status: DISCONTINUED | OUTPATIENT
Start: 2019-05-31 | End: 2019-06-04 | Stop reason: HOSPADM

## 2019-05-31 RX ORDER — DEXAMETHASONE SODIUM PHOSPHATE 4 MG/ML
10 INJECTION, SOLUTION INTRA-ARTICULAR; INTRALESIONAL; INTRAMUSCULAR; INTRAVENOUS; SOFT TISSUE ONCE
Status: COMPLETED | OUTPATIENT
Start: 2019-05-31 | End: 2019-05-31

## 2019-05-31 RX ORDER — SODIUM CHLORIDE 9 MG/ML
75 INJECTION, SOLUTION INTRAVENOUS CONTINUOUS
Status: DISCONTINUED | OUTPATIENT
Start: 2019-05-31 | End: 2019-06-04 | Stop reason: HOSPADM

## 2019-05-31 RX ADMIN — GABAPENTIN 600 MG: 300 CAPSULE ORAL at 21:17

## 2019-05-31 RX ADMIN — SODIUM CHLORIDE 1000 ML: 0.9 INJECTION, SOLUTION INTRAVENOUS at 14:52

## 2019-05-31 RX ADMIN — FLUVOXAMINE MALEATE 300 MG: 50 TABLET, FILM COATED ORAL at 21:17

## 2019-05-31 RX ADMIN — DIPHENHYDRAMINE HYDROCHLORIDE 25 MG: 50 INJECTION, SOLUTION INTRAMUSCULAR; INTRAVENOUS at 14:42

## 2019-05-31 RX ADMIN — HALOPERIDOL LACTATE 5 MG: 5 INJECTION INTRAMUSCULAR at 14:45

## 2019-05-31 RX ADMIN — DEXAMETHASONE SODIUM PHOSPHATE 10 MG: 4 INJECTION, SOLUTION INTRAMUSCULAR; INTRAVENOUS at 16:32

## 2019-05-31 RX ADMIN — ONDANSETRON 4 MG: 2 INJECTION INTRAMUSCULAR; INTRAVENOUS at 14:44

## 2019-05-31 RX ADMIN — TRAMADOL HYDROCHLORIDE 50 MG: 50 TABLET, COATED ORAL at 20:34

## 2019-05-31 RX ADMIN — APIXABAN 5 MG: 5 TABLET, FILM COATED ORAL at 20:02

## 2019-05-31 RX ADMIN — ZOLPIDEM TARTRATE 2.5 MG: 5 TABLET, FILM COATED ORAL at 02:05

## 2019-05-31 RX ADMIN — SODIUM CHLORIDE 125 ML/HR: 0.9 INJECTION, SOLUTION INTRAVENOUS at 17:13

## 2019-05-31 RX ADMIN — METOCLOPRAMIDE 10 MG: 5 INJECTION, SOLUTION INTRAMUSCULAR; INTRAVENOUS at 21:17

## 2019-05-31 RX ADMIN — ONDANSETRON 4 MG: 2 INJECTION INTRAMUSCULAR; INTRAVENOUS at 21:18

## 2019-05-31 RX ADMIN — MAGNESIUM SULFATE HEPTAHYDRATE 2 G: 40 INJECTION, SOLUTION INTRAVENOUS at 14:49

## 2019-05-31 RX ADMIN — LORAZEPAM 2 MG: 1 TABLET ORAL at 20:02

## 2019-06-01 LAB
ANION GAP SERPL CALCULATED.3IONS-SCNC: 10 MMOL/L (ref 4–13)
BUN SERPL-MCNC: 9 MG/DL (ref 5–25)
CALCIUM SERPL-MCNC: 8.8 MG/DL (ref 8.3–10.1)
CHLORIDE SERPL-SCNC: 110 MMOL/L (ref 100–108)
CO2 SERPL-SCNC: 22 MMOL/L (ref 21–32)
CREAT SERPL-MCNC: 0.77 MG/DL (ref 0.6–1.3)
GFR SERPL CREATININE-BSD FRML MDRD: 84 ML/MIN/1.73SQ M
GLUCOSE P FAST SERPL-MCNC: 134 MG/DL (ref 65–99)
GLUCOSE SERPL-MCNC: 134 MG/DL (ref 65–140)
POTASSIUM SERPL-SCNC: 3.9 MMOL/L (ref 3.5–5.3)
SODIUM SERPL-SCNC: 142 MMOL/L (ref 136–145)

## 2019-06-01 PROCEDURE — 99254 IP/OBS CNSLTJ NEW/EST MOD 60: CPT | Performed by: PSYCHIATRY & NEUROLOGY

## 2019-06-01 PROCEDURE — 99232 SBSQ HOSP IP/OBS MODERATE 35: CPT | Performed by: INTERNAL MEDICINE

## 2019-06-01 PROCEDURE — 80048 BASIC METABOLIC PNL TOTAL CA: CPT | Performed by: PHYSICIAN ASSISTANT

## 2019-06-01 RX ORDER — DIHYDROERGOTAMINE MESYLATE 1 MG/ML
1 INJECTION, SOLUTION INTRAMUSCULAR; INTRAVENOUS; SUBCUTANEOUS 3 TIMES DAILY
Status: COMPLETED | OUTPATIENT
Start: 2019-06-01 | End: 2019-06-03

## 2019-06-01 RX ORDER — OXYCODONE HYDROCHLORIDE AND ACETAMINOPHEN 5; 325 MG/1; MG/1
2 TABLET ORAL EVERY 4 HOURS PRN
Status: DISCONTINUED | OUTPATIENT
Start: 2019-06-01 | End: 2019-06-04 | Stop reason: HOSPADM

## 2019-06-01 RX ORDER — LORAZEPAM 1 MG/1
2 TABLET ORAL EVERY 8 HOURS PRN
Status: DISCONTINUED | OUTPATIENT
Start: 2019-06-01 | End: 2019-06-04 | Stop reason: HOSPADM

## 2019-06-01 RX ORDER — HYDRALAZINE HYDROCHLORIDE 20 MG/ML
5 INJECTION INTRAMUSCULAR; INTRAVENOUS EVERY 6 HOURS PRN
Status: DISCONTINUED | OUTPATIENT
Start: 2019-06-01 | End: 2019-06-04 | Stop reason: HOSPADM

## 2019-06-01 RX ADMIN — FLUVOXAMINE MALEATE 200 MG: 50 TABLET, FILM COATED ORAL at 22:03

## 2019-06-01 RX ADMIN — SODIUM CHLORIDE 75 ML/HR: 0.9 INJECTION, SOLUTION INTRAVENOUS at 21:59

## 2019-06-01 RX ADMIN — APIXABAN 5 MG: 5 TABLET, FILM COATED ORAL at 17:31

## 2019-06-01 RX ADMIN — APIXABAN 5 MG: 5 TABLET, FILM COATED ORAL at 08:29

## 2019-06-01 RX ADMIN — ONDANSETRON 4 MG: 2 INJECTION INTRAMUSCULAR; INTRAVENOUS at 08:29

## 2019-06-01 RX ADMIN — METOCLOPRAMIDE 10 MG: 5 INJECTION, SOLUTION INTRAMUSCULAR; INTRAVENOUS at 05:49

## 2019-06-01 RX ADMIN — ARIPIPRAZOLE 15 MG: 10 TABLET ORAL at 08:30

## 2019-06-01 RX ADMIN — DIPHENHYDRAMINE HYDROCHLORIDE 25 MG: 50 INJECTION, SOLUTION INTRAMUSCULAR; INTRAVENOUS at 02:44

## 2019-06-01 RX ADMIN — DIHYDROERGOTAMINE MESYLATE 1 MG: 1 INJECTION, SOLUTION INTRAMUSCULAR; INTRAVENOUS; SUBCUTANEOUS at 21:52

## 2019-06-01 RX ADMIN — SODIUM CHLORIDE 500 MG: 0.9 INJECTION, SOLUTION INTRAVENOUS at 09:31

## 2019-06-01 RX ADMIN — DIHYDROERGOTAMINE MESYLATE 1 MG: 1 INJECTION, SOLUTION INTRAMUSCULAR; INTRAVENOUS; SUBCUTANEOUS at 15:12

## 2019-06-01 RX ADMIN — VALPROATE SODIUM 1000 MG: 100 INJECTION, SOLUTION INTRAVENOUS at 08:37

## 2019-06-01 RX ADMIN — LORAZEPAM 2 MG: 1 TABLET ORAL at 17:31

## 2019-06-01 RX ADMIN — LORAZEPAM 2 MG: 1 TABLET ORAL at 08:28

## 2019-06-01 RX ADMIN — ZONISAMIDE 200 MG: 100 CAPSULE ORAL at 08:30

## 2019-06-01 RX ADMIN — ACETAMINOPHEN 650 MG: 325 TABLET ORAL at 05:42

## 2019-06-01 RX ADMIN — GABAPENTIN 600 MG: 300 CAPSULE ORAL at 22:02

## 2019-06-01 RX ADMIN — TRAMADOL HYDROCHLORIDE 50 MG: 50 TABLET, COATED ORAL at 09:37

## 2019-06-01 RX ADMIN — DIPHENHYDRAMINE HYDROCHLORIDE 25 MG: 50 INJECTION, SOLUTION INTRAMUSCULAR; INTRAVENOUS at 23:11

## 2019-06-01 RX ADMIN — LAMOTRIGINE 200 MG: 100 TABLET ORAL at 08:29

## 2019-06-01 RX ADMIN — GABAPENTIN 600 MG: 300 CAPSULE ORAL at 08:29

## 2019-06-01 RX ADMIN — OXYCODONE HYDROCHLORIDE AND ACETAMINOPHEN 2 TABLET: 5; 325 TABLET ORAL at 13:18

## 2019-06-01 RX ADMIN — GABAPENTIN 600 MG: 300 CAPSULE ORAL at 17:31

## 2019-06-01 RX ADMIN — SODIUM CHLORIDE 75 ML/HR: 0.9 INJECTION, SOLUTION INTRAVENOUS at 07:53

## 2019-06-01 RX ADMIN — METOCLOPRAMIDE 10 MG: 5 INJECTION, SOLUTION INTRAMUSCULAR; INTRAVENOUS at 21:57

## 2019-06-01 RX ADMIN — HYDRALAZINE HYDROCHLORIDE 5 MG: 20 INJECTION INTRAMUSCULAR; INTRAVENOUS at 22:29

## 2019-06-01 RX ADMIN — METOCLOPRAMIDE 10 MG: 5 INJECTION, SOLUTION INTRAMUSCULAR; INTRAVENOUS at 13:19

## 2019-06-01 RX ADMIN — OXYCODONE HYDROCHLORIDE AND ACETAMINOPHEN 2 TABLET: 5; 325 TABLET ORAL at 17:31

## 2019-06-01 RX ADMIN — TRAMADOL HYDROCHLORIDE 50 MG: 50 TABLET, COATED ORAL at 02:44

## 2019-06-01 RX ADMIN — ZOLPIDEM TARTRATE 2.5 MG: 5 TABLET, FILM COATED ORAL at 23:11

## 2019-06-01 RX ADMIN — OXYCODONE HYDROCHLORIDE AND ACETAMINOPHEN 2 TABLET: 5; 325 TABLET ORAL at 22:04

## 2019-06-01 RX ADMIN — LIDOCAINE 1 PATCH: 50 PATCH TOPICAL at 08:29

## 2019-06-01 RX ADMIN — ONDANSETRON 4 MG: 2 INJECTION INTRAMUSCULAR; INTRAVENOUS at 03:57

## 2019-06-02 LAB
BASOPHILS # BLD AUTO: 0.02 THOUSANDS/ΜL (ref 0–0.1)
BASOPHILS NFR BLD AUTO: 0 % (ref 0–1)
EOSINOPHIL # BLD AUTO: 0 THOUSAND/ΜL (ref 0–0.61)
EOSINOPHIL NFR BLD AUTO: 0 % (ref 0–6)
ERYTHROCYTE [DISTWIDTH] IN BLOOD BY AUTOMATED COUNT: 20.9 % (ref 11.6–15.1)
HCT VFR BLD AUTO: 32.8 % (ref 34.8–46.1)
HGB BLD-MCNC: 9.9 G/DL (ref 11.5–15.4)
IMM GRANULOCYTES # BLD AUTO: 0.08 THOUSAND/UL (ref 0–0.2)
IMM GRANULOCYTES NFR BLD AUTO: 1 % (ref 0–2)
IRON SATN MFR SERPL: 4 %
IRON SERPL-MCNC: 17 UG/DL (ref 50–170)
LYMPHOCYTES # BLD AUTO: 0.98 THOUSANDS/ΜL (ref 0.6–4.47)
LYMPHOCYTES NFR BLD AUTO: 9 % (ref 14–44)
MCH RBC QN AUTO: 25.1 PG (ref 26.8–34.3)
MCHC RBC AUTO-ENTMCNC: 30.2 G/DL (ref 31.4–37.4)
MCV RBC AUTO: 83 FL (ref 82–98)
MONOCYTES # BLD AUTO: 0.77 THOUSAND/ΜL (ref 0.17–1.22)
MONOCYTES NFR BLD AUTO: 7 % (ref 4–12)
NEUTROPHILS # BLD AUTO: 9.69 THOUSANDS/ΜL (ref 1.85–7.62)
NEUTS SEG NFR BLD AUTO: 83 % (ref 43–75)
NRBC BLD AUTO-RTO: 0 /100 WBCS
PLATELET # BLD AUTO: 396 THOUSANDS/UL (ref 149–390)
PMV BLD AUTO: 9.6 FL (ref 8.9–12.7)
RBC # BLD AUTO: 3.95 MILLION/UL (ref 3.81–5.12)
TIBC SERPL-MCNC: 390 UG/DL (ref 250–450)
WBC # BLD AUTO: 11.54 THOUSAND/UL (ref 4.31–10.16)

## 2019-06-02 PROCEDURE — 83540 ASSAY OF IRON: CPT | Performed by: INTERNAL MEDICINE

## 2019-06-02 PROCEDURE — 83550 IRON BINDING TEST: CPT | Performed by: INTERNAL MEDICINE

## 2019-06-02 PROCEDURE — 85025 COMPLETE CBC W/AUTO DIFF WBC: CPT | Performed by: INTERNAL MEDICINE

## 2019-06-02 PROCEDURE — 99232 SBSQ HOSP IP/OBS MODERATE 35: CPT | Performed by: PSYCHIATRY & NEUROLOGY

## 2019-06-02 PROCEDURE — 99232 SBSQ HOSP IP/OBS MODERATE 35: CPT | Performed by: INTERNAL MEDICINE

## 2019-06-02 RX ORDER — DOCUSATE SODIUM 100 MG/1
100 CAPSULE, LIQUID FILLED ORAL 2 TIMES DAILY
Status: DISCONTINUED | OUTPATIENT
Start: 2019-06-02 | End: 2019-06-04 | Stop reason: HOSPADM

## 2019-06-02 RX ORDER — ZOLPIDEM TARTRATE 5 MG/1
5 TABLET ORAL
Status: DISCONTINUED | OUTPATIENT
Start: 2019-06-02 | End: 2019-06-04 | Stop reason: HOSPADM

## 2019-06-02 RX ORDER — POLYETHYLENE GLYCOL 3350 17 G/17G
17 POWDER, FOR SOLUTION ORAL DAILY
Status: DISCONTINUED | OUTPATIENT
Start: 2019-06-02 | End: 2019-06-04 | Stop reason: HOSPADM

## 2019-06-02 RX ADMIN — GABAPENTIN 600 MG: 300 CAPSULE ORAL at 18:02

## 2019-06-02 RX ADMIN — DIHYDROERGOTAMINE MESYLATE 1 MG: 1 INJECTION, SOLUTION INTRAMUSCULAR; INTRAVENOUS; SUBCUTANEOUS at 18:01

## 2019-06-02 RX ADMIN — OXYCODONE HYDROCHLORIDE AND ACETAMINOPHEN 2 TABLET: 5; 325 TABLET ORAL at 02:44

## 2019-06-02 RX ADMIN — METOCLOPRAMIDE 10 MG: 5 INJECTION, SOLUTION INTRAMUSCULAR; INTRAVENOUS at 05:37

## 2019-06-02 RX ADMIN — GABAPENTIN 600 MG: 300 CAPSULE ORAL at 21:45

## 2019-06-02 RX ADMIN — OXYCODONE HYDROCHLORIDE AND ACETAMINOPHEN 2 TABLET: 5; 325 TABLET ORAL at 16:05

## 2019-06-02 RX ADMIN — LIDOCAINE 1 PATCH: 50 PATCH TOPICAL at 08:28

## 2019-06-02 RX ADMIN — APIXABAN 5 MG: 5 TABLET, FILM COATED ORAL at 08:28

## 2019-06-02 RX ADMIN — APIXABAN 5 MG: 5 TABLET, FILM COATED ORAL at 18:02

## 2019-06-02 RX ADMIN — LAMOTRIGINE 200 MG: 100 TABLET ORAL at 08:27

## 2019-06-02 RX ADMIN — OXYCODONE HYDROCHLORIDE AND ACETAMINOPHEN 2 TABLET: 5; 325 TABLET ORAL at 21:45

## 2019-06-02 RX ADMIN — ARIPIPRAZOLE 15 MG: 10 TABLET ORAL at 08:28

## 2019-06-02 RX ADMIN — ZOLPIDEM TARTRATE 5 MG: 5 TABLET, FILM COATED ORAL at 21:46

## 2019-06-02 RX ADMIN — LORAZEPAM 2 MG: 1 TABLET ORAL at 18:09

## 2019-06-02 RX ADMIN — DIHYDROERGOTAMINE MESYLATE 1 MG: 1 INJECTION, SOLUTION INTRAMUSCULAR; INTRAVENOUS; SUBCUTANEOUS at 21:41

## 2019-06-02 RX ADMIN — FLUVOXAMINE MALEATE 300 MG: 50 TABLET, FILM COATED ORAL at 21:45

## 2019-06-02 RX ADMIN — DIHYDROERGOTAMINE MESYLATE 1 MG: 1 INJECTION, SOLUTION INTRAMUSCULAR; INTRAVENOUS; SUBCUTANEOUS at 08:29

## 2019-06-02 RX ADMIN — DOCUSATE SODIUM 100 MG: 100 CAPSULE, LIQUID FILLED ORAL at 18:02

## 2019-06-02 RX ADMIN — LORAZEPAM 2 MG: 1 TABLET ORAL at 00:19

## 2019-06-02 RX ADMIN — VALPROATE SODIUM 1000 MG: 100 INJECTION, SOLUTION INTRAVENOUS at 08:30

## 2019-06-02 RX ADMIN — POLYETHYLENE GLYCOL 3350 17 G: 17 POWDER, FOR SOLUTION ORAL at 08:27

## 2019-06-02 RX ADMIN — LORAZEPAM 2 MG: 1 TABLET ORAL at 08:38

## 2019-06-02 RX ADMIN — SODIUM CHLORIDE 500 MG: 0.9 INJECTION, SOLUTION INTRAVENOUS at 09:34

## 2019-06-02 RX ADMIN — GABAPENTIN 600 MG: 300 CAPSULE ORAL at 08:27

## 2019-06-02 RX ADMIN — ZONISAMIDE 200 MG: 100 CAPSULE ORAL at 09:33

## 2019-06-02 RX ADMIN — DOCUSATE SODIUM 100 MG: 100 CAPSULE, LIQUID FILLED ORAL at 08:27

## 2019-06-02 RX ADMIN — ONDANSETRON 4 MG: 2 INJECTION INTRAMUSCULAR; INTRAVENOUS at 18:01

## 2019-06-02 RX ADMIN — METOCLOPRAMIDE 10 MG: 5 INJECTION, SOLUTION INTRAMUSCULAR; INTRAVENOUS at 14:33

## 2019-06-02 RX ADMIN — SODIUM CHLORIDE 75 ML/HR: 0.9 INJECTION, SOLUTION INTRAVENOUS at 12:23

## 2019-06-02 RX ADMIN — METOCLOPRAMIDE 10 MG: 5 INJECTION, SOLUTION INTRAMUSCULAR; INTRAVENOUS at 21:43

## 2019-06-02 RX ADMIN — OXYCODONE HYDROCHLORIDE AND ACETAMINOPHEN 2 TABLET: 5; 325 TABLET ORAL at 10:40

## 2019-06-03 LAB
ANION GAP SERPL CALCULATED.3IONS-SCNC: 9 MMOL/L (ref 4–13)
BUN SERPL-MCNC: 16 MG/DL (ref 5–25)
CALCIUM SERPL-MCNC: 9.1 MG/DL (ref 8.3–10.1)
CHLORIDE SERPL-SCNC: 108 MMOL/L (ref 100–108)
CO2 SERPL-SCNC: 25 MMOL/L (ref 21–32)
CREAT SERPL-MCNC: 0.81 MG/DL (ref 0.6–1.3)
ERYTHROCYTE [DISTWIDTH] IN BLOOD BY AUTOMATED COUNT: 21.2 % (ref 11.6–15.1)
GFR SERPL CREATININE-BSD FRML MDRD: 79 ML/MIN/1.73SQ M
GLUCOSE SERPL-MCNC: 104 MG/DL (ref 65–140)
HCT VFR BLD AUTO: 32.2 % (ref 34.8–46.1)
HGB BLD-MCNC: 9.7 G/DL (ref 11.5–15.4)
MCH RBC QN AUTO: 25.3 PG (ref 26.8–34.3)
MCHC RBC AUTO-ENTMCNC: 30.1 G/DL (ref 31.4–37.4)
MCV RBC AUTO: 84 FL (ref 82–98)
PLATELET # BLD AUTO: 359 THOUSANDS/UL (ref 149–390)
PMV BLD AUTO: 9.7 FL (ref 8.9–12.7)
POTASSIUM SERPL-SCNC: 3.8 MMOL/L (ref 3.5–5.3)
RBC # BLD AUTO: 3.83 MILLION/UL (ref 3.81–5.12)
SODIUM SERPL-SCNC: 142 MMOL/L (ref 136–145)
WBC # BLD AUTO: 11.69 THOUSAND/UL (ref 4.31–10.16)

## 2019-06-03 PROCEDURE — G8979 MOBILITY GOAL STATUS: HCPCS

## 2019-06-03 PROCEDURE — 80048 BASIC METABOLIC PNL TOTAL CA: CPT | Performed by: INTERNAL MEDICINE

## 2019-06-03 PROCEDURE — 97163 PT EVAL HIGH COMPLEX 45 MIN: CPT

## 2019-06-03 PROCEDURE — 99232 SBSQ HOSP IP/OBS MODERATE 35: CPT | Performed by: PSYCHIATRY & NEUROLOGY

## 2019-06-03 PROCEDURE — 99232 SBSQ HOSP IP/OBS MODERATE 35: CPT | Performed by: INTERNAL MEDICINE

## 2019-06-03 PROCEDURE — 85027 COMPLETE CBC AUTOMATED: CPT | Performed by: INTERNAL MEDICINE

## 2019-06-03 PROCEDURE — G8978 MOBILITY CURRENT STATUS: HCPCS

## 2019-06-03 RX ORDER — PROMETHAZINE HYDROCHLORIDE 25 MG/1
25 TABLET ORAL EVERY 8 HOURS SCHEDULED
Status: DISCONTINUED | OUTPATIENT
Start: 2019-06-03 | End: 2019-06-04 | Stop reason: HOSPADM

## 2019-06-03 RX ORDER — MAGNESIUM SULFATE HEPTAHYDRATE 40 MG/ML
2 INJECTION, SOLUTION INTRAVENOUS
Status: DISCONTINUED | OUTPATIENT
Start: 2019-06-04 | End: 2019-06-04 | Stop reason: HOSPADM

## 2019-06-03 RX ORDER — DIPHENHYDRAMINE HYDROCHLORIDE 50 MG/ML
25 INJECTION INTRAMUSCULAR; INTRAVENOUS EVERY 8 HOURS PRN
Status: DISCONTINUED | OUTPATIENT
Start: 2019-06-03 | End: 2019-06-04 | Stop reason: HOSPADM

## 2019-06-03 RX ADMIN — DIPHENHYDRAMINE HYDROCHLORIDE 25 MG: 50 INJECTION, SOLUTION INTRAMUSCULAR; INTRAVENOUS at 22:22

## 2019-06-03 RX ADMIN — METOCLOPRAMIDE 10 MG: 5 INJECTION, SOLUTION INTRAMUSCULAR; INTRAVENOUS at 05:45

## 2019-06-03 RX ADMIN — ARIPIPRAZOLE 15 MG: 10 TABLET ORAL at 09:41

## 2019-06-03 RX ADMIN — OXYCODONE HYDROCHLORIDE AND ACETAMINOPHEN 2 TABLET: 5; 325 TABLET ORAL at 02:36

## 2019-06-03 RX ADMIN — OXYCODONE HYDROCHLORIDE AND ACETAMINOPHEN 2 TABLET: 5; 325 TABLET ORAL at 17:03

## 2019-06-03 RX ADMIN — VALPROATE SODIUM 1000 MG: 100 INJECTION, SOLUTION INTRAVENOUS at 10:45

## 2019-06-03 RX ADMIN — LORAZEPAM 2 MG: 1 TABLET ORAL at 22:22

## 2019-06-03 RX ADMIN — OXYCODONE HYDROCHLORIDE AND ACETAMINOPHEN 2 TABLET: 5; 325 TABLET ORAL at 09:40

## 2019-06-03 RX ADMIN — ZONISAMIDE 200 MG: 100 CAPSULE ORAL at 09:41

## 2019-06-03 RX ADMIN — PROMETHAZINE HYDROCHLORIDE 25 MG: 25 TABLET ORAL at 15:28

## 2019-06-03 RX ADMIN — DIPHENHYDRAMINE HYDROCHLORIDE 25 MG: 50 INJECTION, SOLUTION INTRAMUSCULAR; INTRAVENOUS at 00:19

## 2019-06-03 RX ADMIN — DOCUSATE SODIUM 100 MG: 100 CAPSULE, LIQUID FILLED ORAL at 17:00

## 2019-06-03 RX ADMIN — APIXABAN 5 MG: 5 TABLET, FILM COATED ORAL at 17:00

## 2019-06-03 RX ADMIN — APIXABAN 5 MG: 5 TABLET, FILM COATED ORAL at 09:40

## 2019-06-03 RX ADMIN — LORAZEPAM 2 MG: 1 TABLET ORAL at 12:57

## 2019-06-03 RX ADMIN — DIHYDROERGOTAMINE MESYLATE 1 MG: 1 INJECTION, SOLUTION INTRAMUSCULAR; INTRAVENOUS; SUBCUTANEOUS at 10:00

## 2019-06-03 RX ADMIN — DOCUSATE SODIUM 100 MG: 100 CAPSULE, LIQUID FILLED ORAL at 09:40

## 2019-06-03 RX ADMIN — GABAPENTIN 600 MG: 300 CAPSULE ORAL at 21:09

## 2019-06-03 RX ADMIN — GABAPENTIN 600 MG: 300 CAPSULE ORAL at 17:00

## 2019-06-03 RX ADMIN — ZOLPIDEM TARTRATE 5 MG: 5 TABLET, FILM COATED ORAL at 21:09

## 2019-06-03 RX ADMIN — SODIUM CHLORIDE 75 ML/HR: 0.9 INJECTION, SOLUTION INTRAVENOUS at 15:30

## 2019-06-03 RX ADMIN — ONDANSETRON 4 MG: 2 INJECTION INTRAMUSCULAR; INTRAVENOUS at 21:18

## 2019-06-03 RX ADMIN — SODIUM CHLORIDE 500 MG: 0.9 INJECTION, SOLUTION INTRAVENOUS at 09:46

## 2019-06-03 RX ADMIN — LIDOCAINE 1 PATCH: 50 PATCH TOPICAL at 09:40

## 2019-06-03 RX ADMIN — PROMETHAZINE HYDROCHLORIDE 25 MG: 25 TABLET ORAL at 21:09

## 2019-06-03 RX ADMIN — SODIUM CHLORIDE 75 ML/HR: 0.9 INJECTION, SOLUTION INTRAVENOUS at 00:22

## 2019-06-03 RX ADMIN — LAMOTRIGINE 200 MG: 100 TABLET ORAL at 09:39

## 2019-06-03 RX ADMIN — POLYETHYLENE GLYCOL 3350 17 G: 17 POWDER, FOR SOLUTION ORAL at 09:40

## 2019-06-03 RX ADMIN — GABAPENTIN 600 MG: 300 CAPSULE ORAL at 09:39

## 2019-06-03 RX ADMIN — ONDANSETRON 4 MG: 2 INJECTION INTRAMUSCULAR; INTRAVENOUS at 12:53

## 2019-06-03 RX ADMIN — OXYCODONE HYDROCHLORIDE AND ACETAMINOPHEN 2 TABLET: 5; 325 TABLET ORAL at 21:10

## 2019-06-03 RX ADMIN — LORAZEPAM 2 MG: 1 TABLET ORAL at 02:37

## 2019-06-03 RX ADMIN — FLUVOXAMINE MALEATE 300 MG: 50 TABLET, FILM COATED ORAL at 21:09

## 2019-06-04 ENCOUNTER — HOSPITAL ENCOUNTER (INPATIENT)
Facility: HOSPITAL | Age: 59
LOS: 2 days | Discharge: HOME | DRG: 103 | End: 2019-06-06
Attending: GENERAL PRACTICE | Admitting: GENERAL PRACTICE
Payer: COMMERCIAL

## 2019-06-04 VITALS
DIASTOLIC BLOOD PRESSURE: 76 MMHG | TEMPERATURE: 97.6 F | HEART RATE: 58 BPM | OXYGEN SATURATION: 97 % | SYSTOLIC BLOOD PRESSURE: 141 MMHG | BODY MASS INDEX: 37.74 KG/M2 | RESPIRATION RATE: 18 BRPM | WEIGHT: 248.24 LBS

## 2019-06-04 DIAGNOSIS — Z86.718 HISTORY OF DVT (DEEP VEIN THROMBOSIS): Primary | ICD-10-CM

## 2019-06-04 DIAGNOSIS — G43.919 INTRACTABLE MIGRAINE: ICD-10-CM

## 2019-06-04 PROBLEM — R60.9 EDEMA: Status: ACTIVE | Noted: 2019-06-04

## 2019-06-04 PROBLEM — M79.601 RIGHT ARM PAIN: Status: ACTIVE | Noted: 2019-06-04

## 2019-06-04 PROCEDURE — 99232 SBSQ HOSP IP/OBS MODERATE 35: CPT | Performed by: PSYCHIATRY & NEUROLOGY

## 2019-06-04 PROCEDURE — NC001 PR NO CHARGE: Performed by: PHYSICIAN ASSISTANT

## 2019-06-04 PROCEDURE — 99222 1ST HOSP IP/OBS MODERATE 55: CPT | Performed by: GENERAL PRACTICE

## 2019-06-04 RX ORDER — MAGNESIUM SULFATE HEPTAHYDRATE 40 MG/ML
2 INJECTION, SOLUTION INTRAVENOUS
Status: CANCELLED | OUTPATIENT
Start: 2019-06-04 | End: 2019-06-07

## 2019-06-04 RX ORDER — DOCUSATE SODIUM 100 MG/1
100 CAPSULE, LIQUID FILLED ORAL 2 TIMES DAILY
Status: DISCONTINUED | OUTPATIENT
Start: 2019-06-04 | End: 2019-06-06 | Stop reason: HOSPADM

## 2019-06-04 RX ORDER — ONDANSETRON 2 MG/ML
4 INJECTION INTRAMUSCULAR; INTRAVENOUS EVERY 6 HOURS PRN
Status: CANCELLED | OUTPATIENT
Start: 2019-06-04

## 2019-06-04 RX ORDER — HYDRALAZINE HYDROCHLORIDE 20 MG/ML
5 INJECTION INTRAMUSCULAR; INTRAVENOUS EVERY 6 HOURS PRN
Status: DISCONTINUED | OUTPATIENT
Start: 2019-06-04 | End: 2019-06-06 | Stop reason: HOSPADM

## 2019-06-04 RX ORDER — DOCUSATE SODIUM 100 MG/1
100 CAPSULE, LIQUID FILLED ORAL 2 TIMES DAILY
Status: CANCELLED | OUTPATIENT
Start: 2019-06-04

## 2019-06-04 RX ORDER — MAGNESIUM SULFATE HEPTAHYDRATE 40 MG/ML
2 INJECTION, SOLUTION INTRAVENOUS
Status: DISCONTINUED | OUTPATIENT
Start: 2019-06-05 | End: 2019-06-06 | Stop reason: HOSPADM

## 2019-06-04 RX ORDER — OXYCODONE HYDROCHLORIDE AND ACETAMINOPHEN 5; 325 MG/1; MG/1
2 TABLET ORAL EVERY 4 HOURS PRN
Status: CANCELLED | OUTPATIENT
Start: 2019-06-04

## 2019-06-04 RX ORDER — ZONISAMIDE 100 MG/1
200 CAPSULE ORAL DAILY
Status: DISCONTINUED | OUTPATIENT
Start: 2019-06-05 | End: 2019-06-06 | Stop reason: HOSPADM

## 2019-06-04 RX ORDER — SODIUM CHLORIDE 9 MG/ML
75 INJECTION, SOLUTION INTRAVENOUS CONTINUOUS
Status: CANCELLED | OUTPATIENT
Start: 2019-06-04

## 2019-06-04 RX ORDER — FLUVOXAMINE MALEATE 50 MG/1
300 TABLET, COATED ORAL
Status: DISCONTINUED | OUTPATIENT
Start: 2019-06-04 | End: 2019-06-06 | Stop reason: HOSPADM

## 2019-06-04 RX ORDER — GABAPENTIN 300 MG/1
600 CAPSULE ORAL 3 TIMES DAILY
Status: DISCONTINUED | OUTPATIENT
Start: 2019-06-04 | End: 2019-06-06 | Stop reason: HOSPADM

## 2019-06-04 RX ORDER — HALOPERIDOL 5 MG/ML
2 INJECTION INTRAMUSCULAR EVERY 8 HOURS PRN
Status: DISCONTINUED | OUTPATIENT
Start: 2019-06-04 | End: 2019-06-05

## 2019-06-04 RX ORDER — DIPHENHYDRAMINE HYDROCHLORIDE 50 MG/ML
25 INJECTION INTRAMUSCULAR; INTRAVENOUS EVERY 8 HOURS PRN
Status: DISCONTINUED | OUTPATIENT
Start: 2019-06-04 | End: 2019-06-06 | Stop reason: HOSPADM

## 2019-06-04 RX ORDER — LORAZEPAM 2 MG/ML
1 INJECTION INTRAMUSCULAR EVERY 6 HOURS PRN
Status: DISCONTINUED | OUTPATIENT
Start: 2019-06-04 | End: 2019-06-05

## 2019-06-04 RX ORDER — GABAPENTIN 300 MG/1
600 CAPSULE ORAL 3 TIMES DAILY
Status: CANCELLED | OUTPATIENT
Start: 2019-06-04

## 2019-06-04 RX ORDER — DIPHENHYDRAMINE HYDROCHLORIDE 50 MG/ML
25 INJECTION INTRAMUSCULAR; INTRAVENOUS EVERY 8 HOURS PRN
Status: CANCELLED | OUTPATIENT
Start: 2019-06-04 | End: 2019-06-06

## 2019-06-04 RX ORDER — POLYETHYLENE GLYCOL 3350 17 G/17G
17 POWDER, FOR SOLUTION ORAL DAILY
Status: CANCELLED | OUTPATIENT
Start: 2019-06-04

## 2019-06-04 RX ORDER — LAMOTRIGINE 100 MG/1
200 TABLET ORAL DAILY
Status: DISCONTINUED | OUTPATIENT
Start: 2019-06-05 | End: 2019-06-06 | Stop reason: HOSPADM

## 2019-06-04 RX ORDER — LAMOTRIGINE 100 MG/1
200 TABLET ORAL DAILY
Status: CANCELLED | OUTPATIENT
Start: 2019-06-04

## 2019-06-04 RX ORDER — HYDRALAZINE HYDROCHLORIDE 20 MG/ML
5 INJECTION INTRAMUSCULAR; INTRAVENOUS EVERY 6 HOURS PRN
Status: CANCELLED | OUTPATIENT
Start: 2019-06-04

## 2019-06-04 RX ORDER — GLYCOPYRROLATE 0.2 MG/ML
0.2 INJECTION INTRAMUSCULAR; INTRAVENOUS EVERY 4 HOURS PRN
Status: DISCONTINUED | OUTPATIENT
Start: 2019-06-04 | End: 2019-06-05

## 2019-06-04 RX ORDER — LIDOCAINE 50 MG/G
1 PATCH TOPICAL DAILY
Status: CANCELLED | OUTPATIENT
Start: 2019-06-04

## 2019-06-04 RX ORDER — LORAZEPAM 1 MG/1
2 TABLET ORAL EVERY 8 HOURS PRN
Status: CANCELLED | OUTPATIENT
Start: 2019-06-04

## 2019-06-04 RX ORDER — MEXILETINE HYDROCHLORIDE 150 MG/1
150 CAPSULE ORAL EVERY 8 HOURS SCHEDULED
Status: DISCONTINUED | OUTPATIENT
Start: 2019-06-04 | End: 2019-06-06 | Stop reason: HOSPADM

## 2019-06-04 RX ORDER — FLUVOXAMINE MALEATE 50 MG/1
300 TABLET, COATED ORAL
Status: CANCELLED | OUTPATIENT
Start: 2019-06-04

## 2019-06-04 RX ORDER — ACETAMINOPHEN 325 MG/1
650 TABLET ORAL EVERY 6 HOURS PRN
Status: DISCONTINUED | OUTPATIENT
Start: 2019-06-04 | End: 2019-06-05

## 2019-06-04 RX ORDER — FUROSEMIDE 40 MG/1
40 TABLET ORAL ONCE
Status: COMPLETED | OUTPATIENT
Start: 2019-06-04 | End: 2019-06-04

## 2019-06-04 RX ORDER — ONDANSETRON 2 MG/ML
4 INJECTION INTRAMUSCULAR; INTRAVENOUS EVERY 6 HOURS PRN
Status: DISCONTINUED | OUTPATIENT
Start: 2019-06-04 | End: 2019-06-06 | Stop reason: HOSPADM

## 2019-06-04 RX ORDER — POLYETHYLENE GLYCOL 3350 17 G/17G
17 POWDER, FOR SOLUTION ORAL DAILY
Status: DISCONTINUED | OUTPATIENT
Start: 2019-06-05 | End: 2019-06-06 | Stop reason: HOSPADM

## 2019-06-04 RX ORDER — ZOLPIDEM TARTRATE 5 MG/1
5 TABLET ORAL
Status: DISCONTINUED | OUTPATIENT
Start: 2019-06-04 | End: 2019-06-06 | Stop reason: HOSPADM

## 2019-06-04 RX ORDER — PROMETHAZINE HYDROCHLORIDE 25 MG/1
25 TABLET ORAL EVERY 8 HOURS SCHEDULED
Status: CANCELLED | OUTPATIENT
Start: 2019-06-04 | End: 2019-06-05

## 2019-06-04 RX ORDER — ZONISAMIDE 100 MG/1
200 CAPSULE ORAL DAILY
Status: CANCELLED | OUTPATIENT
Start: 2019-06-04

## 2019-06-04 RX ORDER — OXYCODONE HYDROCHLORIDE AND ACETAMINOPHEN 5; 325 MG/1; MG/1
2 TABLET ORAL EVERY 4 HOURS PRN
Status: ON HOLD | COMMUNITY
End: 2019-06-06 | Stop reason: SDUPTHER

## 2019-06-04 RX ORDER — ACETAMINOPHEN 325 MG/1
650 TABLET ORAL EVERY 6 HOURS PRN
Status: CANCELLED | OUTPATIENT
Start: 2019-06-04

## 2019-06-04 RX ORDER — OXYCODONE HYDROCHLORIDE AND ACETAMINOPHEN 5; 325 MG/1; MG/1
2 TABLET ORAL EVERY 4 HOURS PRN
Status: DISCONTINUED | OUTPATIENT
Start: 2019-06-04 | End: 2019-06-05

## 2019-06-04 RX ORDER — PROMETHAZINE HYDROCHLORIDE 25 MG/1
25 TABLET ORAL EVERY 8 HOURS SCHEDULED
Status: COMPLETED | OUTPATIENT
Start: 2019-06-04 | End: 2019-06-05

## 2019-06-04 RX ORDER — ZOLPIDEM TARTRATE 5 MG/1
5 TABLET ORAL
Status: CANCELLED | OUTPATIENT
Start: 2019-06-04

## 2019-06-04 RX ORDER — LIDOCAINE 50 MG/G
1 PATCH TOPICAL DAILY
Status: DISCONTINUED | OUTPATIENT
Start: 2019-06-05 | End: 2019-06-06 | Stop reason: HOSPADM

## 2019-06-04 RX ORDER — LORAZEPAM 1 MG/1
2 TABLET ORAL EVERY 8 HOURS PRN
Status: DISCONTINUED | OUTPATIENT
Start: 2019-06-04 | End: 2019-06-05

## 2019-06-04 RX ADMIN — APIXABAN 5 MG: 5 TABLET, FILM COATED ORAL at 19:00

## 2019-06-04 RX ADMIN — OXYCODONE HYDROCHLORIDE AND ACETAMINOPHEN 2 TABLET: 5; 325 TABLET ORAL at 02:39

## 2019-06-04 RX ADMIN — PROMETHAZINE HYDROCHLORIDE 25 MG: 25 TABLET ORAL at 14:08

## 2019-06-04 RX ADMIN — PROMETHAZINE HYDROCHLORIDE 25 MG: 25 TABLET ORAL at 21:17

## 2019-06-04 RX ADMIN — DOCUSATE SODIUM 100 MG: 100 CAPSULE, LIQUID FILLED ORAL at 08:30

## 2019-06-04 RX ADMIN — LAMOTRIGINE 200 MG: 100 TABLET ORAL at 08:28

## 2019-06-04 RX ADMIN — MEXILETINE HYDROCHLORIDE 150 MG: 150 CAPSULE ORAL at 15:20

## 2019-06-04 RX ADMIN — LORAZEPAM 1 MG: 2 INJECTION, SOLUTION INTRAMUSCULAR; INTRAVENOUS at 19:02

## 2019-06-04 RX ADMIN — GABAPENTIN 600 MG: 300 CAPSULE ORAL at 21:17

## 2019-06-04 RX ADMIN — POLYETHYLENE GLYCOL 3350 17 G: 17 POWDER, FOR SOLUTION ORAL at 08:28

## 2019-06-04 RX ADMIN — ONDANSETRON 4 MG: 2 INJECTION INTRAMUSCULAR; INTRAVENOUS at 08:34

## 2019-06-04 RX ADMIN — FLUVOXAMINE MALEATE 300 MG: 50 TABLET, FILM COATED ORAL at 21:18

## 2019-06-04 RX ADMIN — FUROSEMIDE 40 MG: 40 TABLET ORAL at 14:08

## 2019-06-04 RX ADMIN — LORAZEPAM 2 MG: 1 TABLET ORAL at 08:34

## 2019-06-04 RX ADMIN — APIXABAN 5 MG: 5 TABLET, FILM COATED ORAL at 08:28

## 2019-06-04 RX ADMIN — ZONISAMIDE 200 MG: 100 CAPSULE ORAL at 08:29

## 2019-06-04 RX ADMIN — SODIUM CHLORIDE 500 MG: 0.9 INJECTION, SOLUTION INTRAVENOUS at 10:28

## 2019-06-04 RX ADMIN — SODIUM CHLORIDE 75 ML/HR: 0.9 INJECTION, SOLUTION INTRAVENOUS at 05:20

## 2019-06-04 RX ADMIN — VALPROATE SODIUM 1000 MG: 100 INJECTION, SOLUTION INTRAVENOUS at 08:28

## 2019-06-04 RX ADMIN — LIDOCAINE 1 PATCH: 50 PATCH TOPICAL at 08:30

## 2019-06-04 RX ADMIN — ZOLPIDEM TARTRATE 5 MG: 5 TABLET, FILM COATED ORAL at 21:18

## 2019-06-04 RX ADMIN — GABAPENTIN 600 MG: 300 CAPSULE ORAL at 08:28

## 2019-06-04 RX ADMIN — MAGNESIUM SULFATE HEPTAHYDRATE 2 G: 40 INJECTION, SOLUTION INTRAVENOUS at 11:19

## 2019-06-04 RX ADMIN — SODIUM CHLORIDE 0.1 MG/KG/HR: 0.9 INJECTION, SOLUTION INTRAVENOUS at 14:58

## 2019-06-04 RX ADMIN — PROMETHAZINE HYDROCHLORIDE 25 MG: 25 TABLET ORAL at 05:24

## 2019-06-04 RX ADMIN — ARIPIPRAZOLE 15 MG: 10 TABLET ORAL at 08:29

## 2019-06-04 RX ADMIN — GABAPENTIN 600 MG: 300 CAPSULE ORAL at 15:23

## 2019-06-04 RX ADMIN — MEXILETINE HYDROCHLORIDE 150 MG: 150 CAPSULE ORAL at 21:17

## 2019-06-04 RX ADMIN — LORAZEPAM 2 MG: 1 TABLET ORAL at 14:08

## 2019-06-05 LAB
ANION GAP SERPL CALCULATED.3IONS-SCNC: 4 MMOL/L (ref 4–13)
BUN SERPL-MCNC: 14 MG/DL (ref 5–25)
CALCIUM SERPL-MCNC: 8.7 MG/DL (ref 8.3–10.1)
CHLORIDE SERPL-SCNC: 108 MMOL/L (ref 100–108)
CO2 SERPL-SCNC: 32 MMOL/L (ref 21–32)
CREAT SERPL-MCNC: 0.79 MG/DL (ref 0.6–1.3)
ERYTHROCYTE [DISTWIDTH] IN BLOOD BY AUTOMATED COUNT: 20.6 % (ref 11.6–15.1)
GFR SERPL CREATININE-BSD FRML MDRD: 82 ML/MIN/1.73SQ M
GLUCOSE SERPL-MCNC: 86 MG/DL (ref 65–140)
HCT VFR BLD AUTO: 31.6 % (ref 34.8–46.1)
HGB BLD-MCNC: 9.7 G/DL (ref 11.5–15.4)
MCH RBC QN AUTO: 25 PG (ref 26.8–34.3)
MCHC RBC AUTO-ENTMCNC: 30.7 G/DL (ref 31.4–37.4)
MCV RBC AUTO: 81 FL (ref 82–98)
PLATELET # BLD AUTO: 355 THOUSANDS/UL (ref 149–390)
PMV BLD AUTO: 10.9 FL (ref 8.9–12.7)
POTASSIUM SERPL-SCNC: 3.6 MMOL/L (ref 3.5–5.3)
RBC # BLD AUTO: 3.88 MILLION/UL (ref 3.81–5.12)
SODIUM SERPL-SCNC: 144 MMOL/L (ref 136–145)
WBC # BLD AUTO: 12.62 THOUSAND/UL (ref 4.31–10.16)

## 2019-06-05 PROCEDURE — 85027 COMPLETE CBC AUTOMATED: CPT | Performed by: GENERAL PRACTICE

## 2019-06-05 PROCEDURE — 99232 SBSQ HOSP IP/OBS MODERATE 35: CPT | Performed by: PSYCHIATRY & NEUROLOGY

## 2019-06-05 PROCEDURE — G8979 MOBILITY GOAL STATUS: HCPCS

## 2019-06-05 PROCEDURE — 97163 PT EVAL HIGH COMPLEX 45 MIN: CPT

## 2019-06-05 PROCEDURE — G8978 MOBILITY CURRENT STATUS: HCPCS

## 2019-06-05 PROCEDURE — 99232 SBSQ HOSP IP/OBS MODERATE 35: CPT | Performed by: PHYSICIAN ASSISTANT

## 2019-06-05 PROCEDURE — 80048 BASIC METABOLIC PNL TOTAL CA: CPT | Performed by: GENERAL PRACTICE

## 2019-06-05 RX ORDER — HALOPERIDOL 5 MG/ML
2 INJECTION INTRAMUSCULAR EVERY 8 HOURS PRN
Status: DISCONTINUED | OUTPATIENT
Start: 2019-06-05 | End: 2019-06-06

## 2019-06-05 RX ORDER — PROMETHAZINE HYDROCHLORIDE 25 MG/1
25 TABLET ORAL EVERY 8 HOURS SCHEDULED
Status: COMPLETED | OUTPATIENT
Start: 2019-06-05 | End: 2019-06-06

## 2019-06-05 RX ORDER — METHOCARBAMOL 750 MG/1
750 TABLET, FILM COATED ORAL EVERY 8 HOURS SCHEDULED
Status: DISCONTINUED | OUTPATIENT
Start: 2019-06-05 | End: 2019-06-06 | Stop reason: HOSPADM

## 2019-06-05 RX ORDER — GLYCOPYRROLATE 0.2 MG/ML
0.2 INJECTION INTRAMUSCULAR; INTRAVENOUS EVERY 4 HOURS PRN
Status: DISCONTINUED | OUTPATIENT
Start: 2019-06-05 | End: 2019-06-06

## 2019-06-05 RX ORDER — ACETAMINOPHEN 325 MG/1
975 TABLET ORAL EVERY 8 HOURS SCHEDULED
Status: DISCONTINUED | OUTPATIENT
Start: 2019-06-05 | End: 2019-06-06 | Stop reason: HOSPADM

## 2019-06-05 RX ORDER — LORAZEPAM 2 MG/ML
1 INJECTION INTRAMUSCULAR EVERY 6 HOURS PRN
Status: DISCONTINUED | OUTPATIENT
Start: 2019-06-05 | End: 2019-06-06

## 2019-06-05 RX ADMIN — PROMETHAZINE HYDROCHLORIDE 25 MG: 25 TABLET ORAL at 05:42

## 2019-06-05 RX ADMIN — DOCUSATE SODIUM 100 MG: 100 CAPSULE, LIQUID FILLED ORAL at 08:43

## 2019-06-05 RX ADMIN — LORAZEPAM 2 MG: 1 TABLET ORAL at 08:54

## 2019-06-05 RX ADMIN — MAGNESIUM SULFATE HEPTAHYDRATE 2 G: 40 INJECTION, SOLUTION INTRAVENOUS at 11:43

## 2019-06-05 RX ADMIN — ZOLPIDEM TARTRATE 5 MG: 5 TABLET, FILM COATED ORAL at 21:44

## 2019-06-05 RX ADMIN — ACETAMINOPHEN 975 MG: 325 TABLET ORAL at 14:55

## 2019-06-05 RX ADMIN — GABAPENTIN 600 MG: 300 CAPSULE ORAL at 21:43

## 2019-06-05 RX ADMIN — GABAPENTIN 600 MG: 300 CAPSULE ORAL at 08:43

## 2019-06-05 RX ADMIN — SODIUM CHLORIDE 0.4 MG/KG/HR: 0.9 INJECTION, SOLUTION INTRAVENOUS at 21:42

## 2019-06-05 RX ADMIN — ARIPIPRAZOLE 15 MG: 10 TABLET ORAL at 08:43

## 2019-06-05 RX ADMIN — SODIUM CHLORIDE 500 MG: 0.9 INJECTION, SOLUTION INTRAVENOUS at 10:16

## 2019-06-05 RX ADMIN — ONDANSETRON 4 MG: 2 INJECTION INTRAMUSCULAR; INTRAVENOUS at 10:03

## 2019-06-05 RX ADMIN — ONDANSETRON 4 MG: 2 INJECTION INTRAMUSCULAR; INTRAVENOUS at 19:15

## 2019-06-05 RX ADMIN — SODIUM CHLORIDE 0.3 MG/KG/HR: 0.9 INJECTION, SOLUTION INTRAVENOUS at 00:11

## 2019-06-05 RX ADMIN — ZONISAMIDE 200 MG: 100 CAPSULE ORAL at 11:43

## 2019-06-05 RX ADMIN — MEXILETINE HYDROCHLORIDE 150 MG: 150 CAPSULE ORAL at 05:42

## 2019-06-05 RX ADMIN — MEXILETINE HYDROCHLORIDE 150 MG: 150 CAPSULE ORAL at 16:26

## 2019-06-05 RX ADMIN — SODIUM CHLORIDE 0.4 MG/KG/HR: 0.9 INJECTION, SOLUTION INTRAVENOUS at 10:05

## 2019-06-05 RX ADMIN — PROMETHAZINE HYDROCHLORIDE 25 MG: 25 TABLET ORAL at 21:43

## 2019-06-05 RX ADMIN — LORAZEPAM 1 MG: 2 INJECTION, SOLUTION INTRAMUSCULAR; INTRAVENOUS at 16:27

## 2019-06-05 RX ADMIN — SODIUM CHLORIDE 0.4 MG/KG/HR: 0.9 INJECTION, SOLUTION INTRAVENOUS at 16:26

## 2019-06-05 RX ADMIN — MEXILETINE HYDROCHLORIDE 150 MG: 150 CAPSULE ORAL at 21:45

## 2019-06-05 RX ADMIN — PROMETHAZINE HYDROCHLORIDE 25 MG: 25 TABLET ORAL at 12:42

## 2019-06-05 RX ADMIN — APIXABAN 5 MG: 5 TABLET, FILM COATED ORAL at 08:43

## 2019-06-05 RX ADMIN — ONDANSETRON 4 MG: 2 INJECTION INTRAMUSCULAR; INTRAVENOUS at 00:17

## 2019-06-05 RX ADMIN — METHOCARBAMOL 750 MG: 750 TABLET, FILM COATED ORAL at 14:55

## 2019-06-05 RX ADMIN — POLYETHYLENE GLYCOL 3350 17 G: 17 POWDER, FOR SOLUTION ORAL at 08:44

## 2019-06-05 RX ADMIN — GABAPENTIN 600 MG: 300 CAPSULE ORAL at 16:35

## 2019-06-05 RX ADMIN — APIXABAN 5 MG: 5 TABLET, FILM COATED ORAL at 19:00

## 2019-06-05 RX ADMIN — LAMOTRIGINE 200 MG: 100 TABLET ORAL at 08:43

## 2019-06-05 RX ADMIN — ACETAMINOPHEN 975 MG: 325 TABLET ORAL at 21:43

## 2019-06-05 RX ADMIN — METHOCARBAMOL 750 MG: 750 TABLET, FILM COATED ORAL at 21:43

## 2019-06-05 RX ADMIN — FLUVOXAMINE MALEATE 300 MG: 50 TABLET, FILM COATED ORAL at 21:44

## 2019-06-05 RX ADMIN — LIDOCAINE 1 PATCH: 50 PATCH CUTANEOUS at 08:43

## 2019-06-06 VITALS
OXYGEN SATURATION: 96 % | WEIGHT: 263.89 LBS | DIASTOLIC BLOOD PRESSURE: 83 MMHG | HEIGHT: 68 IN | TEMPERATURE: 99 F | BODY MASS INDEX: 39.99 KG/M2 | HEART RATE: 77 BPM | SYSTOLIC BLOOD PRESSURE: 159 MMHG | RESPIRATION RATE: 16 BRPM

## 2019-06-06 PROCEDURE — 99238 HOSP IP/OBS DSCHRG MGMT 30/<: CPT | Performed by: PHYSICIAN ASSISTANT

## 2019-06-06 PROCEDURE — 99232 SBSQ HOSP IP/OBS MODERATE 35: CPT | Performed by: PSYCHIATRY & NEUROLOGY

## 2019-06-06 RX ORDER — LORAZEPAM 2 MG/ML
1 INJECTION INTRAMUSCULAR EVERY 6 HOURS PRN
Status: DISCONTINUED | OUTPATIENT
Start: 2019-06-06 | End: 2019-06-06

## 2019-06-06 RX ORDER — GLYCOPYRROLATE 0.2 MG/ML
0.2 INJECTION INTRAMUSCULAR; INTRAVENOUS EVERY 4 HOURS PRN
Status: DISCONTINUED | OUTPATIENT
Start: 2019-06-06 | End: 2019-06-06

## 2019-06-06 RX ORDER — INDOMETHACIN 25 MG/1
25 CAPSULE ORAL 2 TIMES DAILY WITH MEALS
Status: DISCONTINUED | OUTPATIENT
Start: 2019-06-06 | End: 2019-06-06 | Stop reason: HOSPADM

## 2019-06-06 RX ORDER — OXYCODONE HYDROCHLORIDE AND ACETAMINOPHEN 5; 325 MG/1; MG/1
2 TABLET ORAL EVERY 4 HOURS PRN
Qty: 10 TABLET | Refills: 0 | Status: SHIPPED | OUTPATIENT
Start: 2019-06-06 | End: 2019-06-16

## 2019-06-06 RX ORDER — HALOPERIDOL 5 MG/ML
2 INJECTION INTRAMUSCULAR EVERY 8 HOURS PRN
Status: DISCONTINUED | OUTPATIENT
Start: 2019-06-06 | End: 2019-06-06

## 2019-06-06 RX ADMIN — PROMETHAZINE HYDROCHLORIDE 25 MG: 25 TABLET ORAL at 05:17

## 2019-06-06 RX ADMIN — LAMOTRIGINE 200 MG: 100 TABLET ORAL at 10:36

## 2019-06-06 RX ADMIN — METHOCARBAMOL 750 MG: 750 TABLET, FILM COATED ORAL at 13:19

## 2019-06-06 RX ADMIN — ZONISAMIDE 200 MG: 100 CAPSULE ORAL at 10:50

## 2019-06-06 RX ADMIN — INDOMETHACIN 25 MG: 25 CAPSULE ORAL at 13:18

## 2019-06-06 RX ADMIN — LIDOCAINE 1 PATCH: 50 PATCH CUTANEOUS at 10:36

## 2019-06-06 RX ADMIN — MEXILETINE HYDROCHLORIDE 150 MG: 150 CAPSULE ORAL at 16:38

## 2019-06-06 RX ADMIN — ACETAMINOPHEN 975 MG: 325 TABLET ORAL at 05:17

## 2019-06-06 RX ADMIN — DOCUSATE SODIUM 100 MG: 100 CAPSULE, LIQUID FILLED ORAL at 10:36

## 2019-06-06 RX ADMIN — GABAPENTIN 600 MG: 300 CAPSULE ORAL at 10:36

## 2019-06-06 RX ADMIN — ARIPIPRAZOLE 15 MG: 10 TABLET ORAL at 10:36

## 2019-06-06 RX ADMIN — INDOMETHACIN 25 MG: 25 CAPSULE ORAL at 16:38

## 2019-06-06 RX ADMIN — SODIUM CHLORIDE 500 MG: 0.9 INJECTION, SOLUTION INTRAVENOUS at 10:56

## 2019-06-06 RX ADMIN — MEXILETINE HYDROCHLORIDE 150 MG: 150 CAPSULE ORAL at 05:23

## 2019-06-06 RX ADMIN — APIXABAN 5 MG: 5 TABLET, FILM COATED ORAL at 10:36

## 2019-06-06 RX ADMIN — MAGNESIUM SULFATE HEPTAHYDRATE 2 G: 40 INJECTION, SOLUTION INTRAVENOUS at 10:56

## 2019-06-06 RX ADMIN — GABAPENTIN 600 MG: 300 CAPSULE ORAL at 16:38

## 2019-06-06 RX ADMIN — ACETAMINOPHEN 975 MG: 325 TABLET ORAL at 13:19

## 2019-06-06 RX ADMIN — APIXABAN 5 MG: 5 TABLET, FILM COATED ORAL at 16:39

## 2019-06-06 RX ADMIN — LORAZEPAM 1 MG: 2 INJECTION INTRAMUSCULAR; INTRAVENOUS at 13:17

## 2019-06-06 RX ADMIN — SODIUM CHLORIDE 0.3 MG/KG/HR: 0.9 INJECTION, SOLUTION INTRAVENOUS at 02:52

## 2019-06-06 RX ADMIN — METHOCARBAMOL 750 MG: 750 TABLET, FILM COATED ORAL at 05:17

## 2019-06-06 RX ADMIN — ONDANSETRON 4 MG: 2 INJECTION INTRAMUSCULAR; INTRAVENOUS at 05:17

## 2019-07-06 ENCOUNTER — APPOINTMENT (EMERGENCY)
Dept: RADIOLOGY | Facility: HOSPITAL | Age: 59
DRG: 103 | End: 2019-07-06
Payer: COMMERCIAL

## 2019-07-06 ENCOUNTER — APPOINTMENT (EMERGENCY)
Dept: CT IMAGING | Facility: HOSPITAL | Age: 59
DRG: 103 | End: 2019-07-06
Payer: COMMERCIAL

## 2019-07-06 ENCOUNTER — HOSPITAL ENCOUNTER (INPATIENT)
Facility: HOSPITAL | Age: 59
LOS: 1 days | Discharge: HOME | DRG: 103 | End: 2019-07-09
Attending: EMERGENCY MEDICINE | Admitting: INTERNAL MEDICINE
Payer: COMMERCIAL

## 2019-07-06 DIAGNOSIS — D64.9 ANEMIA: ICD-10-CM

## 2019-07-06 DIAGNOSIS — G43.111 INTRACTABLE MIGRAINE WITH AURA WITH STATUS MIGRAINOSUS: ICD-10-CM

## 2019-07-06 DIAGNOSIS — R51.9 HEADACHE: ICD-10-CM

## 2019-07-06 DIAGNOSIS — R26.2 AMBULATORY DYSFUNCTION: ICD-10-CM

## 2019-07-06 DIAGNOSIS — F31.9 BIPOLAR DEPRESSION (HCC): ICD-10-CM

## 2019-07-06 DIAGNOSIS — R55 SYNCOPE: Primary | ICD-10-CM

## 2019-07-06 LAB
ALBUMIN SERPL BCP-MCNC: 2.8 G/DL (ref 3.5–5)
ALP SERPL-CCNC: 108 U/L (ref 46–116)
ALT SERPL W P-5'-P-CCNC: 18 U/L (ref 12–78)
ANION GAP SERPL CALCULATED.3IONS-SCNC: 9 MMOL/L (ref 4–13)
ANISOCYTOSIS BLD QL SMEAR: PRESENT
AST SERPL W P-5'-P-CCNC: 30 U/L (ref 5–45)
BASOPHILS # BLD MANUAL: 0 THOUSAND/UL (ref 0–0.1)
BASOPHILS NFR MAR MANUAL: 0 % (ref 0–1)
BILIRUB SERPL-MCNC: 0.17 MG/DL (ref 0.2–1)
BUN SERPL-MCNC: 11 MG/DL (ref 5–25)
CALCIUM SERPL-MCNC: 8.5 MG/DL (ref 8.3–10.1)
CHLORIDE SERPL-SCNC: 107 MMOL/L (ref 100–108)
CO2 SERPL-SCNC: 28 MMOL/L (ref 21–32)
CREAT SERPL-MCNC: 0.83 MG/DL (ref 0.6–1.3)
EOSINOPHIL # BLD MANUAL: 0 THOUSAND/UL (ref 0–0.4)
EOSINOPHIL NFR BLD MANUAL: 0 % (ref 0–6)
ERYTHROCYTE [DISTWIDTH] IN BLOOD BY AUTOMATED COUNT: 19.5 % (ref 11.6–15.1)
GFR SERPL CREATININE-BSD FRML MDRD: 77 ML/MIN/1.73SQ M
GLUCOSE SERPL-MCNC: 90 MG/DL (ref 65–140)
HCT VFR BLD AUTO: 31.4 % (ref 34.8–46.1)
HGB BLD-MCNC: 9.3 G/DL (ref 11.5–15.4)
HYPERCHROMIA BLD QL SMEAR: PRESENT
INR PPP: 1.15 (ref 0.84–1.19)
LG PLATELETS BLD QL SMEAR: PRESENT
LYMPHOCYTES # BLD AUTO: 2.86 THOUSAND/UL (ref 0.6–4.47)
LYMPHOCYTES # BLD AUTO: 25 % (ref 14–44)
MCH RBC QN AUTO: 24.9 PG (ref 26.8–34.3)
MCHC RBC AUTO-ENTMCNC: 29.6 G/DL (ref 31.4–37.4)
MCV RBC AUTO: 84 FL (ref 82–98)
MONOCYTES # BLD AUTO: 1.03 THOUSAND/UL (ref 0–1.22)
MONOCYTES NFR BLD: 9 % (ref 4–12)
MYELOCYTES NFR BLD MANUAL: 1 % (ref 0–1)
NEUTROPHILS # BLD MANUAL: 7.43 THOUSAND/UL (ref 1.85–7.62)
NEUTS BAND NFR BLD MANUAL: 2 % (ref 0–8)
NEUTS SEG NFR BLD AUTO: 63 % (ref 43–75)
NRBC BLD AUTO-RTO: 0 /100 WBCS
OVALOCYTES BLD QL SMEAR: PRESENT
PLATELET # BLD AUTO: 339 THOUSANDS/UL (ref 149–390)
PLATELET BLD QL SMEAR: ADEQUATE
PMV BLD AUTO: 9.7 FL (ref 8.9–12.7)
POTASSIUM SERPL-SCNC: 3.6 MMOL/L (ref 3.5–5.3)
PROT SERPL-MCNC: 6.5 G/DL (ref 6.4–8.2)
PROTHROMBIN TIME: 14.9 SECONDS (ref 11.6–14.5)
RBC # BLD AUTO: 3.74 MILLION/UL (ref 3.81–5.12)
SODIUM SERPL-SCNC: 144 MMOL/L (ref 136–145)
TOTAL CELLS COUNTED SPEC: 100
TROPONIN I SERPL-MCNC: <0.02 NG/ML
TSH SERPL DL<=0.05 MIU/L-ACNC: 1.3 UIU/ML (ref 0.36–3.74)
WBC # BLD AUTO: 11.43 THOUSAND/UL (ref 4.31–10.16)

## 2019-07-06 PROCEDURE — 85610 PROTHROMBIN TIME: CPT | Performed by: EMERGENCY MEDICINE

## 2019-07-06 PROCEDURE — 70450 CT HEAD/BRAIN W/O DYE: CPT

## 2019-07-06 PROCEDURE — 99285 EMERGENCY DEPT VISIT HI MDM: CPT

## 2019-07-06 PROCEDURE — 72125 CT NECK SPINE W/O DYE: CPT

## 2019-07-06 PROCEDURE — 99285 EMERGENCY DEPT VISIT HI MDM: CPT | Performed by: EMERGENCY MEDICINE

## 2019-07-06 PROCEDURE — 84443 ASSAY THYROID STIM HORMONE: CPT | Performed by: EMERGENCY MEDICINE

## 2019-07-06 PROCEDURE — 84484 ASSAY OF TROPONIN QUANT: CPT | Performed by: EMERGENCY MEDICINE

## 2019-07-06 PROCEDURE — 85027 COMPLETE CBC AUTOMATED: CPT | Performed by: EMERGENCY MEDICINE

## 2019-07-06 PROCEDURE — 93005 ELECTROCARDIOGRAM TRACING: CPT

## 2019-07-06 PROCEDURE — 36415 COLL VENOUS BLD VENIPUNCTURE: CPT

## 2019-07-06 PROCEDURE — 96360 HYDRATION IV INFUSION INIT: CPT

## 2019-07-06 PROCEDURE — 85007 BL SMEAR W/DIFF WBC COUNT: CPT | Performed by: EMERGENCY MEDICINE

## 2019-07-06 PROCEDURE — 80053 COMPREHEN METABOLIC PANEL: CPT | Performed by: EMERGENCY MEDICINE

## 2019-07-06 RX ORDER — LORAZEPAM 1 MG/1
2 TABLET ORAL 3 TIMES DAILY
Status: DISCONTINUED | OUTPATIENT
Start: 2019-07-06 | End: 2019-07-09 | Stop reason: HOSPADM

## 2019-07-06 RX ORDER — ACETAMINOPHEN 325 MG/1
650 TABLET ORAL EVERY 6 HOURS PRN
Status: DISCONTINUED | OUTPATIENT
Start: 2019-07-06 | End: 2019-07-09 | Stop reason: HOSPADM

## 2019-07-06 RX ORDER — ARIPIPRAZOLE 5 MG/1
5 TABLET ORAL DAILY
Status: DISCONTINUED | OUTPATIENT
Start: 2019-07-07 | End: 2019-07-09 | Stop reason: HOSPADM

## 2019-07-06 RX ORDER — ZONISAMIDE 100 MG/1
200 CAPSULE ORAL DAILY
Status: DISCONTINUED | OUTPATIENT
Start: 2019-07-07 | End: 2019-07-09 | Stop reason: HOSPADM

## 2019-07-06 RX ORDER — DEXAMETHASONE SODIUM PHOSPHATE 4 MG/ML
10 INJECTION, SOLUTION INTRA-ARTICULAR; INTRALESIONAL; INTRAMUSCULAR; INTRAVENOUS; SOFT TISSUE ONCE
Status: COMPLETED | OUTPATIENT
Start: 2019-07-06 | End: 2019-07-06

## 2019-07-06 RX ORDER — HALOPERIDOL 1 MG/1
2 TABLET ORAL EVERY 8 HOURS PRN
Status: DISCONTINUED | OUTPATIENT
Start: 2019-07-06 | End: 2019-07-06

## 2019-07-06 RX ORDER — FLUVOXAMINE MALEATE 50 MG/1
100 TABLET, COATED ORAL
Status: DISCONTINUED | OUTPATIENT
Start: 2019-07-06 | End: 2019-07-06

## 2019-07-06 RX ORDER — OXYCODONE HYDROCHLORIDE AND ACETAMINOPHEN 5; 325 MG/1; MG/1
1 TABLET ORAL ONCE
Status: COMPLETED | OUTPATIENT
Start: 2019-07-07 | End: 2019-07-07

## 2019-07-06 RX ORDER — DIPHENHYDRAMINE HYDROCHLORIDE 50 MG/ML
25 INJECTION INTRAMUSCULAR; INTRAVENOUS ONCE
Status: COMPLETED | OUTPATIENT
Start: 2019-07-06 | End: 2019-07-06

## 2019-07-06 RX ORDER — HALOPERIDOL 2 MG/1
2 TABLET ORAL EVERY 8 HOURS PRN
Status: DISCONTINUED | OUTPATIENT
Start: 2019-07-06 | End: 2019-07-09 | Stop reason: HOSPADM

## 2019-07-06 RX ORDER — GABAPENTIN 300 MG/1
600 CAPSULE ORAL 3 TIMES DAILY
Status: DISCONTINUED | OUTPATIENT
Start: 2019-07-06 | End: 2019-07-09 | Stop reason: HOSPADM

## 2019-07-06 RX ORDER — ACETAMINOPHEN 325 MG/1
650 TABLET ORAL EVERY 6 HOURS PRN
Status: DISCONTINUED | OUTPATIENT
Start: 2019-07-06 | End: 2019-07-06

## 2019-07-06 RX ORDER — ONDANSETRON 2 MG/ML
4 INJECTION INTRAMUSCULAR; INTRAVENOUS EVERY 6 HOURS PRN
Status: DISCONTINUED | OUTPATIENT
Start: 2019-07-06 | End: 2019-07-09 | Stop reason: HOSPADM

## 2019-07-06 RX ORDER — SODIUM CHLORIDE 9 MG/ML
75 INJECTION, SOLUTION INTRAVENOUS CONTINUOUS
Status: DISCONTINUED | OUTPATIENT
Start: 2019-07-06 | End: 2019-07-07

## 2019-07-06 RX ORDER — LAMOTRIGINE 100 MG/1
200 TABLET ORAL DAILY
Status: DISCONTINUED | OUTPATIENT
Start: 2019-07-07 | End: 2019-07-09 | Stop reason: HOSPADM

## 2019-07-06 RX ORDER — METOCLOPRAMIDE HYDROCHLORIDE 5 MG/ML
10 INJECTION INTRAMUSCULAR; INTRAVENOUS ONCE
Status: COMPLETED | OUTPATIENT
Start: 2019-07-06 | End: 2019-07-06

## 2019-07-06 RX ORDER — ACETAMINOPHEN 325 MG/1
650 TABLET ORAL ONCE
Status: COMPLETED | OUTPATIENT
Start: 2019-07-06 | End: 2019-07-06

## 2019-07-06 RX ORDER — FLUVOXAMINE MALEATE 50 MG/1
300 TABLET, COATED ORAL
Status: DISCONTINUED | OUTPATIENT
Start: 2019-07-06 | End: 2019-07-09 | Stop reason: HOSPADM

## 2019-07-06 RX ORDER — ZONISAMIDE 25 MG/1
50 CAPSULE ORAL DAILY
Status: DISCONTINUED | OUTPATIENT
Start: 2019-07-07 | End: 2019-07-06

## 2019-07-06 RX ADMIN — SODIUM CHLORIDE 1000 ML: 0.9 INJECTION, SOLUTION INTRAVENOUS at 15:31

## 2019-07-06 RX ADMIN — HALOPERIDOL 2 MG: 2 TABLET ORAL at 18:13

## 2019-07-06 RX ADMIN — APIXABAN 5 MG: 5 TABLET, FILM COATED ORAL at 18:03

## 2019-07-06 RX ADMIN — ACETAMINOPHEN 650 MG: 325 TABLET ORAL at 22:30

## 2019-07-06 RX ADMIN — GABAPENTIN 600 MG: 300 CAPSULE ORAL at 18:03

## 2019-07-06 RX ADMIN — SODIUM CHLORIDE 75 ML/HR: 0.9 INJECTION, SOLUTION INTRAVENOUS at 18:03

## 2019-07-06 RX ADMIN — LORAZEPAM 2 MG: 1 TABLET ORAL at 21:23

## 2019-07-06 RX ADMIN — FLUVOXAMINE MALEATE 300 MG: 50 TABLET, FILM COATED ORAL at 21:23

## 2019-07-06 RX ADMIN — DEXAMETHASONE SODIUM PHOSPHATE 10 MG: 4 INJECTION, SOLUTION INTRAMUSCULAR; INTRAVENOUS at 16:32

## 2019-07-06 RX ADMIN — ACETAMINOPHEN 650 MG: 325 TABLET ORAL at 14:53

## 2019-07-06 RX ADMIN — METOCLOPRAMIDE 10 MG: 5 INJECTION, SOLUTION INTRAMUSCULAR; INTRAVENOUS at 16:32

## 2019-07-06 RX ADMIN — LORAZEPAM 2 MG: 1 TABLET ORAL at 18:03

## 2019-07-06 RX ADMIN — DIPHENHYDRAMINE HYDROCHLORIDE 25 MG: 50 INJECTION, SOLUTION INTRAMUSCULAR; INTRAVENOUS at 16:32

## 2019-07-06 RX ADMIN — GABAPENTIN 600 MG: 300 CAPSULE ORAL at 21:23

## 2019-07-06 NOTE — ED NOTES
Pt states that she still feels dizzy and unable to stand and is concerned that if she is DC'd that she will have another syncopal episode at home  Dr Rubin Mcgraw made aware  Verbal order given to obtain orthostatic BPs        Manjula Damon RN  07/06/19 2965

## 2019-07-06 NOTE — ED NOTES
Pt reporting that she is now feeling dizzy, Dr Yasir Lindsay made aware        Sanford Rendon RN  07/06/19 2715

## 2019-07-06 NOTE — ED NOTES
Ambulated pt per Dr Buffy Amaro  Pt states that she feels really dizzy and her head really hurts   Dr Buffy Amaro notified      Barbara Graves RN  07/06/19 7161

## 2019-07-06 NOTE — H&P
History and Physical - [facility]    Patient Information: Robbie Tanner 59 y o  female MRN: [id number]  Unit/Bed#: [facility] Encounter: [id number]  Admitting Physician: Candice Head MD  PCP: Giovana Rich DO  Date of Admission:  07/06/19    Assessment/Plan:  77-year-old female with a history of recurrent syncope today while at her place of residence states she felt dizzy and and leg buckling fell against a entertainment center(she described to the ED physician falling backwards and told me she fell from words) describing apparent loss of consciousness although her usual syncopal attacks are syncope and collapse and no preceding symptoms of dizziness or lightheadedness  She presented to the ED with concerns of striking her head and being on Eliquis where workup Ng entailed a CT of the cervical spine and brain that was unremarkable for any signs of bleeding she then preceded to have complaints of increasing dizziness unable to obtain upright status and increasing headache described as frontal to her back atypical for history of migraines will that usually have an aura and states herself that this is quite different than her usual migraines  A migraine cocktail given to her had no result  She also complains of some nausea and on obtaining sitting up in litter became further dizzy   Hospital Problem List:     Principal Problem:    Syncope  Active Problems:    Bipolar depression ([facility] [address] )    Intractable migraine    DVT, lower extremity ([facility] [address] )      Plan for the Primary Problem(s):  · Syncope with a history of recurrent syncope of  unclear etiology, she is on numerous psychoactive medications was not no signs of QT prolongation there is some contradictory aspects to her history however    Will at admitted to observation status  · Headache that seems to be more post concussive than her typical migraine and will need to be assess further next 24 hours and have Neurology evaluate I see no signs of head trauma in her scalp and CT imaging was unremarkable of cervical spine and brain  · History of DVT right upper extremity after usage of a PICC line will continue on Eliquis as no signs of hemorrhagic changes on CT imaging  · Bipolar depression continue on present meds including Haldol, Luvox of which MAR list she is taking 300 mg q h s  Which is double the maximum dosage also on Zonegran at high dosage which is indicated for focal onset seizures also on Lamictal 200 and Abilify    Plan for Additional Problems:   · Cardiac murmur heard on auscultation she denies having prior history of heart murmur and states she will over a year since she has had an echocardiogram which is hard to believe given the recurrent syncope  she has had but will order 2D echocardiogram last stress test in January 2018 apparently normal doubt cardiac syncope in this setting but will place on telemetry to watch QT intervals  · Ambulatory dysfunction possibly in relation to post concussive state will obtain PT/OT  · Diagnostic differential listed in chart notes adrenal insufficiency but she is not on steroids and no signs of sodium or potassium abnormalities    VTE Prophylaxis: Apixaban (Eliquis)  / sequential compression device   Code Status:  Full  POLST: POLST form is not discussed and not completed at this time  Anticipated Length of Stay:  Patient will be admitted on an Observation basis with an anticipated length of stay of  * less than 2 midnights  Justification for Hospital Stay:  Syncope    Total Time for Visit, including Counseling / Coordination of Care: 45 minutes  Greater than 50% of this total time spent on direct patient counseling and coordination of care      Chief Complaint:       History of Present Illness:    Neris Campos is a 59 y o  female who presents with another episode of syncope this 1 dissimilar from previous in that she felt some dizziness prior to the event but did not describe this on initial presentation to the ED described she just collapsed and hit her head on entertainment center she had a workup included a CT of the head and cervical spine both of which were normal with concerns in relation to possible hematoma from her chronic Eliquis usage for DVT  Thereafter when told nothing found she developed increasing headache dizziness and inability to ambulate  There is no outward signs of trauma to her scalp frontal brow or occiput she describes falling backwards to the ED physician and to me described falling forwards  Presently admits to some nausea but wants to have dinner  Past medical history includes a history of bipolar disorder fibromyalgia and spinal stenosis/chronic migraines but described present headache as different than her usual migraines  Review of Systems:    Review of Systems   Constitutional: Negative  HENT: Negative  Eyes: Negative  Respiratory: Negative  Cardiovascular: Negative  Gastrointestinal: Negative  Endocrine: Negative  Genitourinary: Negative  Musculoskeletal: Positive for back pain, gait problem, myalgias and neck stiffness  Skin: Negative  Neurological: Positive for dizziness, syncope, weakness and light-headedness  Hematological: Negative  Psychiatric/Behavioral: Positive for behavioral problems         Past Medical and Surgical History:     Past Medical History:   Diagnosis Date    Adrenal insufficiency (Graham's disease) ([facility] [address] )     Bipolar disorder     Cervical radiculopathy     Chronic back pain     DVT, lower extremity ([facility] [address] ) 1991    Fibromyalgia     History of TIAs     cannot remember details    Hypertension     Hypokalemia     Migraine     MRSA (methicillin resistant Staphylococcus aureus) 07/20/2012    nasal swab negative 4/5/19    Psychiatric disorder     Anxiety, major depression, bipolar    Spinal stenosis     Syncope 2014    orthostatic hypotension       Past Surgical History:   Procedure Laterality Date    APPENDECTOMY  GASTRIC RESTRICTION SURGERY      Gastric Sleeve Nov 2015    HYSTERECTOMY      JOINT REPLACEMENT      Left Knee 2011 and Right Hip 2010       Meds/Allergies:    Prior to Admission medications    Medication Sig Start Date End Date Taking? Authorizing Provider   apixaban (ELIQUIS) 5 mg Take 5 mg by mouth 2 (two) times a day   Yes Historical Provider, MD   ARIPiprazole (ABILIFY) 5 mg tablet Take 1 tablet by mouth daily  Patient taking differently: Take 15 mg by mouth daily   8/4/17  Yes Tila Tinoco DO   Erenumab-aooe (AIMOVIG) 140 MG/ML SOAJ Inject 140 mg under the skin every 30 (thirty) days  3/22/19  Yes Historical Provider, MD   eszopiclone (LUNESTA) 3 MG tablet Take 3 mg by mouth daily   Yes Historical Provider, MD   fluvoxaMINE (LUVOX) 100 mg tablet Take 300 mg by mouth daily at bedtime     Yes Historical Provider, MD   gabapentin (NEURONTIN) 600 MG tablet Take 600 mg by mouth 3 (three) times a day  Yes Historical Provider, MD   haloperidol (HALDOL) 5 mg tablet Take one half tab orally twice a day as needed for headache or nausea  Max 2 tabs/day, 3 days/week 5/21/19  Yes Historical Provider, MD   lamoTRIgine (LaMICtal) 200 MG tablet Take 200 mg by mouth daily  Yes Historical Provider, MD   LORazepam (ATIVAN) 1 mg tablet Take 2 mg by mouth 3 (three) times a day     Yes Historical Provider, MD   zonisamide (ZONEGRAN) 50 MG capsule Take 1 capsule by mouth daily  Patient taking differently: Take 200 mg by mouth daily  8/4/17  Yes Tila Tinoco DO   dihydroergotamine (MIGRANAL) 4 MG/ML nasal spray 1 spray into each nostril as needed for migraine Use in one nostril as directed  No more than 4 sprays in 24 hours  Max 2 days a week  Patient not taking: Reported on 6/4/2019 4/12/19 7/6/19  Amanda Mukherjee DO   mexiletine (MEXITIL) 150 mg capsule Take 150 mg by mouth Three times a day 5/21/19 7/6/19  Historical Provider, MD WORLEY have reviewed home medications with patient personally      Allergies: Allergies   Allergen Reactions    Ketorolac Itching and Other (See Comments)     [toradol] Itching, hives    Mushroom Extract Complex     Risperidone      Other reaction(s): Unknown Reaction    Temazepam Hives       Social History:     Marital Status: /Civil Union   Occupation:   Patient Pre-hospital Living Situation:  Lives with spouse who is away at the time  Patient Pre-hospital Level of Mobility:  Ambulatory  Patient Pre-hospital Diet Restrictions:  No restrictions  Substance Use History:   Social History     Substance and Sexual Activity   Alcohol Use Not Currently    Frequency: Never    Binge frequency: Never     Social History     Tobacco Use   Smoking Status Former Smoker    Packs/day: 0 20    Types: Cigarettes    Last attempt to quit:     Years since quittin 5   Smokeless Tobacco Never Used     Social History     Substance and Sexual Activity   Drug Use No       Family History:    non-contributory    Physical Exam:     Vitals:   Blood Pressure: 143/67 (19 1631)  Pulse: 65 (19 1631)  Temperature: 98 °F (36 7 °C) (19 1143)  Temp Source: Temporal (19 1143)  Respirations: 18 (19 1631)  SpO2: 97 % (19 1631)       General appearance: alert, appears stated age and cooperative  Head: Normocephalic, without obvious abnormality, atraumatic  Lungs: clear to auscultation bilaterally  Heart: regular rate and rhythm systolic ejection murmur heard best at the left 2nd intercostal space also throughout based  Abdomen: soft, non-tender; bowel sounds normal; no masses,  no organomegaly  Back: negative  Extremities: extremities normal, atraumatic, no cyanosis or edema  Neurologic: Grossly normal        Additional Data:     Lab Results: I have personally reviewed pertinent reports        Results from last 7 days   Lab Units 19  1154   WBC Thousand/uL 11 43*   HEMOGLOBIN g/dL 9 3*   HEMATOCRIT % 31 4*   PLATELETS Thousands/uL 339   LYMPHO PCT % 25   MONO PCT % 9   EOS PCT % 0     Results from last 7 days   Lab Units 07/06/19  1154   POTASSIUM mmol/L 3 6   CHLORIDE mmol/L 107   CO2 mmol/L 28   BUN mg/dL 11   CREATININE mg/dL 0 83   CALCIUM mg/dL 8 5   ALK PHOS U/L 108   ALT U/L 18   AST U/L 30     Results from last 7 days   Lab Units 07/06/19  1154   INR  1 15       Imaging: I have personally reviewed pertinent reports  Ct Head Without Contrast    Result Date: 7/6/2019  Narrative: CT BRAIN - WITHOUT CONTRAST INDICATION:   syncope, fall head injury Eliquis  COMPARISON:  None  TECHNIQUE:  CT examination of the brain was performed  In addition to axial images, coronal 2D reformatted images were created and submitted for interpretation  Radiation dose length product (DLP) for this visit:  987 mGy-cm   This examination, like all CT scans performed in the [facility], was performed utilizing techniques to minimize radiation dose exposure, including the use of iterative reconstruction and automated exposure control  IMAGE QUALITY:  Diagnostic  FINDINGS: PARENCHYMA:  No intracranial mass, mass effect or midline shift  No CT signs of acute infarction  No acute parenchymal hemorrhage  VENTRICLES AND EXTRA-AXIAL SPACES:  Normal for the patient's age  VISUALIZED ORBITS AND PARANASAL SINUSES:  Unremarkable  CALVARIUM AND EXTRACRANIAL SOFT TISSUES:  Normal      Impression: No acute intracranial abnormality  Workstation performed: [id number]     Ct Cervical Spine Without Contrast    Result Date: 7/6/2019  Narrative: CT CERVICAL SPINE - WITHOUT CONTRAST INDICATION:   fall, injury  COMPARISON:  Cervical spine CT 1/4/2018  TECHNIQUE:  CT examination of the cervical spine was performed without intravenous contrast   Contiguous axial images were obtained  Sagittal and coronal reconstructions were performed  Radiation dose length product (DLP) for this visit:  602 mGy-cm     This examination, like all CT scans performed in the [facility], was performed utilizing techniques to minimize radiation dose exposure, including the use of iterative reconstruction and automated exposure control  IMAGE QUALITY:  Diagnostic  FINDINGS: ALIGNMENT:  There is straightening of normal cervical lordosis  No subluxation or compression deformity  VERTEBRAL BODIES:  No fracture  DEGENERATIVE CHANGES:  Mild multilevel cervical degenerative changes are noted without critical central canal stenosis  PREVERTEBRAL AND PARASPINAL SOFT TISSUES:  Unremarkable  THORACIC INLET:  Normal      Impression: No cervical spine fracture or traumatic malalignment  Workstation performed: [id number]       EKG, Pathology, and Other Studies Reviewed on Admission:   · EKG:  Sinus rhythm no signs of QT prolongation    [employer] Records Reviewed: Yes     ** Please Note: Dragon 360 Dictation voice to text software may have been used in the creation of this document   **

## 2019-07-06 NOTE — ED NOTES
Pt reports feeling dizzy for the entirety of orthostatic VS including while lying        Lizzy Franco RN  07/06/19 7091

## 2019-07-06 NOTE — ED PROVIDER NOTES
History  Chief Complaint   Patient presents with    Syncope     Pt states she had a syncopal episode at home approx 45 min ago  Pt states this frequnetly hapens to her and they cannot figure out why  Pt is on eliquis and states she hit her head on the entertainement stamd  No laceration noted  Pt denies loc        History provided by:  Patient   used: No    Medical Problem - Major   Location:  States she had a syncopal episode, fell backwards striking her head, takes Eliquis  Has had syncope in the past which has no cause  Also h/o intractable headaches  Severity:  Severe  Onset quality:  Sudden  Duration: just prior to arrival   Timing:  Constant  Progression:  Worsening  Chronicity:  Recurrent  Relieved by:  Nothing  Worsened by:  Standing up  Ineffective treatments:  None tried  Associated symptoms: headaches    Associated symptoms: no abdominal pain, no chest pain, no congestion, no cough, no diarrhea, no fever, no nausea, no shortness of breath, no sore throat and no vomiting    Patient states she just fell and hit her back of her head and had a syncopal episode  She then later said she was a little dizzy prior to this happening  She states she has been eating and drinking no fevers or chills  No chest pain or shortness of breath  No palpitations  Prior to Admission Medications   Prescriptions Last Dose Informant Patient Reported? Taking?    ARIPiprazole (ABILIFY) 5 mg tablet   No Yes   Sig: Take 1 tablet by mouth daily   Patient taking differently: Take 15 mg by mouth daily     Erenumab-aooe (AIMOVIG) 140 MG/ML SOAJ   Yes Yes   Sig: Inject 140 mg under the skin every 30 (thirty) days    LORazepam (ATIVAN) 1 mg tablet   Yes Yes   Sig: Take 2 mg by mouth 3 (three) times a day     apixaban (ELIQUIS) 5 mg   Yes Yes   Sig: Take 5 mg by mouth 2 (two) times a day   eszopiclone (LUNESTA) 3 MG tablet   Yes Yes   Sig: Take 3 mg by mouth daily   fluvoxaMINE (LUVOX) 100 mg tablet   Yes Yes Sig: Take 300 mg by mouth daily at bedtime     gabapentin (NEURONTIN) 600 MG tablet   Yes Yes   Sig: Take 600 mg by mouth 3 (three) times a day    haloperidol (HALDOL) 5 mg tablet   Yes Yes   Sig: Take one half tab orally twice a day as needed for headache or nausea  Max 2 tabs/day, 3 days/week   lamoTRIgine (LaMICtal) 200 MG tablet   Yes Yes   Sig: Take 200 mg by mouth daily  zonisamide (ZONEGRAN) 50 MG capsule  Self No Yes   Sig: Take 1 capsule by mouth daily   Patient taking differently: Take 200 mg by mouth daily       Facility-Administered Medications: None       Past Medical History:   Diagnosis Date    Adrenal insufficiency ([place]'s disease) ([facility] [address] )     Bipolar disorder     Cervical radiculopathy     Chronic back pain     [facility] )     Fibromyalgia     History of TIAs     cannot remember details    Hypertension     Hypokalemia     Migraine     MRSA (methicillin resistant Staphylococcus aureus) 2012    nasal swab negative 19    Psychiatric disorder     Anxiety, major depression, bipolar    Spinal stenosis     Syncope     orthostatic hypotension       Past Surgical History:   Procedure Laterality Date    APPENDECTOMY      GASTRIC RESTRICTION SURGERY      Gastric Sleeve 2015    HYSTERECTOMY      JOINT REPLACEMENT      Left Knee  and Right Hip        Family History   Problem Relation Age of Onset    Breast cancer Mother     Migraines Mother     Arthritis Mother     Kidney disease Father     Heart disease Father     Diabetes Father     Arthritis Father     Brain cancer Brother     Seizures Brother      I have reviewed and agree with the history as documented      Social History     Tobacco Use    Smoking status: Former Smoker     Packs/day: 0 20     Types: Cigarettes     Last attempt to quit:      Years since quittin 5    Smokeless tobacco: Never Used   Substance Use Topics    Alcohol use: Not Currently     Frequency: Never Binge frequency: Never    Drug use: No        Review of Systems   Constitutional: Negative for chills and fever  HENT: Negative for congestion and sore throat  Respiratory: Negative for cough, chest tightness and shortness of breath  Cardiovascular: Negative for chest pain  Gastrointestinal: Negative for abdominal pain, diarrhea, nausea and vomiting  Genitourinary: Negative for dysuria  Musculoskeletal: Positive for neck pain  Negative for arthralgias, back pain and gait problem  Skin: Negative for wound  Neurological: Positive for light-headedness and headaches  Negative for dizziness, facial asymmetry, speech difficulty, weakness and numbness  All other systems reviewed and are negative  Physical Exam  Physical Exam   Constitutional: She appears well-developed and well-nourished  She is cooperative  Non-toxic appearance  She does not have a sickly appearance  She does not appear ill  No distress  HENT:   Head: Normocephalic and atraumatic  Right Ear: Hearing normal  No drainage or swelling  Left Ear: Hearing normal  No drainage or swelling  Mouth/Throat: Uvula is midline, oropharynx is clear and moist and mucous membranes are normal  No oropharyngeal exudate  Eyes: Pupils are equal, round, and reactive to light  Conjunctivae, EOM and lids are normal  Right eye exhibits no discharge  Left eye exhibits no discharge  Neck: Trachea normal and normal range of motion  Neck supple  No JVD present  Cardiovascular: Normal rate, regular rhythm, normal heart sounds, intact distal pulses and normal pulses  Exam reveals no gallop and no friction rub  No murmur heard  Pulmonary/Chest: Effort normal and breath sounds normal  No stridor  No respiratory distress  She has no wheezes  She has no rales  She exhibits no tenderness  Abdominal: Soft  Normal appearance  She exhibits no ascites and no mass  There is no hepatosplenomegaly  There is no tenderness   There is no rebound, no guarding and no CVA tenderness  obese   Musculoskeletal: Normal range of motion  She exhibits no edema or tenderness  Lymphadenopathy:     She has no cervical adenopathy  Right: No inguinal adenopathy present  Left: No inguinal adenopathy present  Neurological: She is alert  She has normal strength  No cranial nerve deficit or sensory deficit  She exhibits normal muscle tone  Coordination and gait normal  GCS eye subscore is 4  GCS verbal subscore is 5  GCS motor subscore is 6  Skin: Skin is warm, dry and intact  No rash noted  She is not diaphoretic  No pallor  Psychiatric: She has a normal mood and affect  Her speech is normal and behavior is normal  Judgment and thought content normal  Cognition and memory are normal    Nursing note and vitals reviewed        Vital Signs  ED Triage Vitals   Temperature Pulse Respirations Blood Pressure SpO2   07/06/19 1143 07/06/19 1143 07/06/19 1143 07/06/19 1143 07/06/19 1143   98 °F (36 7 °C) 74 16 (!) 177/85 93 %      Temp Source Heart Rate Source Patient Position - Orthostatic VS BP Location FiO2 (%)   07/06/19 1143 07/06/19 1143 07/06/19 1237 07/06/19 1143 --   Temporal Monitor Lying Right arm       Pain Score       07/06/19 1237       Worst Possible Pain           Vitals:    07/06/19 1348 07/06/19 1445 07/06/19 1447 07/06/19 1449   BP: 166/79 140/63 145/63 139/65   Pulse: 69 66 74 80   Patient Position - Orthostatic VS: Lying Lying Sitting Lying         Visual Acuity  Visual Acuity      Most Recent Value   L Pupil Size (mm)  3   R Pupil Size (mm)  3          ED Medications  Medications   metoclopramide (REGLAN) injection 10 mg (has no administration in time range)   diphenhydrAMINE (BENADRYL) injection 25 mg (has no administration in time range)   dexamethasone (DECADRON) injection 10 mg (has no administration in time range)   acetaminophen (TYLENOL) tablet 650 mg (650 mg Oral Given 7/6/19 1453)   sodium chloride 0 9 % bolus 1,000 mL (1,000 mL Intravenous New Bag 7/6/19 1531)       Diagnostic Studies  Results Reviewed     Procedure Component Value Units Date/Time    TSH [[id number]]  (Normal) Collected:  07/06/19 1154    Lab Status:  Final result Specimen:  Blood from Hand, Left Updated:  07/06/19 1245     TSH 3RD GENERATON 1 298 uIU/mL     Narrative:       Patients undergoing fluorescein dye angiography may retain small amounts of fluorescein in the body for 48-72 hours post procedure  Samples containing fluorescein can produce falsely depressed TSH values  If the patient had this procedure,a specimen should be resubmitted post fluorescein clearance  CBC and differential [[id number]]  (Abnormal) Collected:  07/06/19 1154    Lab Status:  Final result Specimen:  Blood from Hand, Left Updated:  07/06/19 1240     WBC 11 43 Thousand/uL      RBC 3 74 Million/uL      Hemoglobin 9 3 g/dL      Hematocrit 31 4 %      MCV 84 fL      MCH 24 9 pg      MCHC 29 6 g/dL      RDW 19 5 %      MPV 9 7 fL      Platelets 125 Thousands/uL      nRBC 0 /100 WBCs     Narrative: This is an appended report  These results have been appended to a previously verified report      Troponin I [[id number]]  (Normal) Collected:  07/06/19 1154    Lab Status:  Final result Specimen:  Blood from Hand, Left Updated:  07/06/19 1228     Troponin I <0 02 ng/mL     Comprehensive metabolic panel [[id number]]  (Abnormal) Collected:  07/06/19 1154    Lab Status:  Final result Specimen:  Blood from Hand, Left Updated:  07/06/19 1225     Sodium 144 mmol/L      Potassium 3 6 mmol/L      Chloride 107 mmol/L      CO2 28 mmol/L      ANION GAP 9 mmol/L      BUN 11 mg/dL      Creatinine 0 83 mg/dL      Glucose 90 mg/dL      Calcium 8 5 mg/dL      AST 30 U/L      ALT 18 U/L      Alkaline Phosphatase 108 U/L      Total Protein 6 5 g/dL      Albumin 2 8 g/dL      Total Bilirubin 0 17 mg/dL      eGFR 77 ml/min/1 73sq m     Narrative:       [place] guidelines for Chronic Kidney Disease (CKD):     Stage 1 with normal or high GFR (GFR > 90 mL/min/1 73 square meters)    Stage 2 Mild CKD (GFR = 60-89 mL/min/1 73 square meters)    Stage 3A Moderate CKD (GFR = 45-59 mL/min/1 73 square meters)    Stage 3B Moderate CKD (GFR = 30-44 mL/min/1 73 square meters)    Stage 4 Severe CKD (GFR = 15-29 mL/min/1 73 square meters)    Stage 5 End Stage CKD (GFR <15 mL/min/1 73 square meters)  Note: GFR calculation is accurate only with a steady state creatinine    Protime-INR [[id number]]  (Abnormal) Collected:  07/06/19 1154    Lab Status:  Final result Specimen:  Blood from Hand, Left Updated:  07/06/19 1216     Protime 14 9 seconds      INR 1 15                 CT head without contrast   Final Result by Corrie Montero MD (07/06 1253)      No acute intracranial abnormality  Workstation performed: [id number]         CT cervical spine without contrast   Final Result by Corrie Montero MD (07/06 1251)      No cervical spine fracture or traumatic malalignment  Workstation performed: [id number]                    Procedures  ECG 12 Lead Documentation Only  Date/Time: 7/6/2019 4:30 PM  Performed by: Mikki Romano MD  Authorized by: Mikki Romano MD     Indications / Diagnosis:  Syncope  ECG reviewed by me, the ED Provider: yes    Patient location:  ED  Interpretation:     Interpretation: non-specific    Rate:     ECG rate assessment: normal    Rhythm:     Rhythm comment:  Junctional vs sinus  Ectopy:     Ectopy: none    QRS:     QRS axis:  Left    QRS intervals:  Normal  Conduction:     Conduction: normal    ST segments:     ST segments:  Normal  T waves:     T waves: normal             ED Course                               MDM  Number of Diagnoses or Management Options  Ambulatory dysfunction:   Anemia:   Headache:   Syncope:   Diagnosis management comments: Syncopal episode now with dizziness and lightheadedness    Mild orthostatic changes not improved with IV fluids  Now having intractable headache  Head CT was normal   Could potentially be concussive symptoms but patient cannot ambulate  Case was discussed with Internal Medicine regarding admission  Amount and/or Complexity of Data Reviewed  Clinical lab tests: ordered and reviewed  Tests in the radiology section of CPT®: ordered and reviewed  Tests in the medicine section of CPT®: ordered and reviewed  Discuss the patient with other providers: yes  Independent visualization of images, tracings, or specimens: yes    Patient Progress  Patient progress: stable      Disposition  Final diagnoses:   Syncope   Anemia   Ambulatory dysfunction   Headache     Time reflects when diagnosis was documented in both MDM as applicable and the Disposition within this note     Time User Action Codes Description Comment    7/6/2019  2:11 PM Shaleninn Corning Add [R55] Syncope     7/6/2019  2:12 PM Ralene Ruse [D64 9] Anemia     7/6/2019  4:29 PM Shaaron Corning Add [R26 2] Ambulatory dysfunction     7/6/2019  4:29 PM Shaaron Corning Add [R51] Headache       ED Disposition     ED Disposition Condition Date/Time Comment    Admit Stable Sat Jul 6, 2019  4:28 PM Case was discussed with Keyana Lim and the patient's admission status was agreed to be Admission Status: observation status to the service of Dr Keyana Lim           Follow-up Information     Follow up With Specialties Details Why [id number] [facility], DO  Schedule an appointment as soon as possible for a visit in 1 week  [facility] [address] [phone]            Current Discharge Medication List      CONTINUE these medications which have NOT CHANGED    Details   apixaban (ELIQUIS) 5 mg Take 5 mg by mouth 2 (two) times a day      ARIPiprazole (ABILIFY) 5 mg tablet Take 1 tablet by mouth daily  Qty: 30 tablet, Refills: 3      Erenumab-aooe (AIMOVIG) 140 MG/ML SOAJ Inject 140 mg under the skin every 30 (thirty) days       eszopiclone (LUNESTA) 3 MG tablet Take 3 mg by mouth daily      fluvoxaMINE (LUVOX) 100 mg tablet Take 300 mg by mouth daily at bedtime        gabapentin (NEURONTIN) 600 MG tablet Take 600 mg by mouth 3 (three) times a day       haloperidol (HALDOL) 5 mg tablet Take one half tab orally twice a day as needed for headache or nausea  Max 2 tabs/day, 3 days/week      lamoTRIgine (LaMICtal) 200 MG tablet Take 200 mg by mouth daily  LORazepam (ATIVAN) 1 mg tablet Take 2 mg by mouth 3 (three) times a day        zonisamide (ZONEGRAN) 50 MG capsule Take 1 capsule by mouth daily  Qty: 30 capsule, Refills: 3           No discharge procedures on file      ED Provider  Electronically Signed by           Jamari Zazueta MD  07/06/19 6357

## 2019-07-06 NOTE — ED NOTES
Patient transported to [facility] Clem Ospina RN  07/06/19 4422

## 2019-07-06 NOTE — ED NOTES
Pt complaining of head pain, dizziness, and SOB  Rated pain a 10  Reported to LELE Avila  07/06/19 4224

## 2019-07-06 NOTE — ED NOTES
Pt updated that Dr Lynn Torres will be back in to re-assess her as results are coming back        Wilder Bravo RN  07/06/19 0203

## 2019-07-07 LAB
ATRIAL RATE: 288 BPM
P AXIS: 24 DEGREES
QRS AXIS: -5 DEGREES
QRSD INTERVAL: 86 MS
QT INTERVAL: 398 MS
QTC INTERVAL: 423 MS
T WAVE AXIS: 20 DEGREES
VENTRICULAR RATE: 68 BPM

## 2019-07-07 PROCEDURE — 99245 OFF/OP CONSLTJ NEW/EST HI 55: CPT | Performed by: PSYCHIATRY & NEUROLOGY

## 2019-07-07 PROCEDURE — 93010 ELECTROCARDIOGRAM REPORT: CPT | Performed by: INTERNAL MEDICINE

## 2019-07-07 PROCEDURE — 99224 PR SBSQ OBSERVATION CARE/DAY 15 MINUTES: CPT | Performed by: INTERNAL MEDICINE

## 2019-07-07 RX ORDER — MECLIZINE HCL 12.5 MG/1
25 TABLET ORAL EVERY 8 HOURS PRN
Status: DISCONTINUED | OUTPATIENT
Start: 2019-07-07 | End: 2019-07-09 | Stop reason: HOSPADM

## 2019-07-07 RX ORDER — ZOLPIDEM TARTRATE 5 MG/1
2.5 TABLET ORAL
Status: DISCONTINUED | OUTPATIENT
Start: 2019-07-07 | End: 2019-07-07

## 2019-07-07 RX ORDER — ZOLPIDEM TARTRATE 5 MG/1
2.5 TABLET ORAL
Status: DISCONTINUED | OUTPATIENT
Start: 2019-07-07 | End: 2019-07-09 | Stop reason: HOSPADM

## 2019-07-07 RX ORDER — DIHYDROERGOTAMINE MESYLATE 1 MG/ML
1 INJECTION, SOLUTION INTRAMUSCULAR; INTRAVENOUS; SUBCUTANEOUS ONCE
Status: COMPLETED | OUTPATIENT
Start: 2019-07-07 | End: 2019-07-07

## 2019-07-07 RX ADMIN — ZONISAMIDE 200 MG: 100 CAPSULE ORAL at 08:35

## 2019-07-07 RX ADMIN — LORAZEPAM 2 MG: 1 TABLET ORAL at 15:48

## 2019-07-07 RX ADMIN — ZOLPIDEM TARTRATE 2.5 MG: 5 TABLET, FILM COATED ORAL at 21:46

## 2019-07-07 RX ADMIN — ONDANSETRON 4 MG: 2 INJECTION INTRAMUSCULAR; INTRAVENOUS at 08:33

## 2019-07-07 RX ADMIN — ARIPIPRAZOLE 5 MG: 5 TABLET ORAL at 08:35

## 2019-07-07 RX ADMIN — GABAPENTIN 600 MG: 300 CAPSULE ORAL at 08:35

## 2019-07-07 RX ADMIN — ONDANSETRON 4 MG: 2 INJECTION INTRAMUSCULAR; INTRAVENOUS at 20:19

## 2019-07-07 RX ADMIN — OXYCODONE HYDROCHLORIDE AND ACETAMINOPHEN 1 TABLET: 5; 325 TABLET ORAL at 00:08

## 2019-07-07 RX ADMIN — GABAPENTIN 600 MG: 300 CAPSULE ORAL at 15:48

## 2019-07-07 RX ADMIN — HALOPERIDOL 2 MG: 2 TABLET ORAL at 11:04

## 2019-07-07 RX ADMIN — LORAZEPAM 2 MG: 1 TABLET ORAL at 21:45

## 2019-07-07 RX ADMIN — GABAPENTIN 600 MG: 300 CAPSULE ORAL at 21:45

## 2019-07-07 RX ADMIN — ACETAMINOPHEN 650 MG: 325 TABLET ORAL at 11:44

## 2019-07-07 RX ADMIN — APIXABAN 5 MG: 5 TABLET, FILM COATED ORAL at 18:31

## 2019-07-07 RX ADMIN — MECLIZINE 25 MG: 12.5 TABLET ORAL at 11:58

## 2019-07-07 RX ADMIN — DIHYDROERGOTAMINE MESYLATE 1 MG: 1 INJECTION, SOLUTION INTRAMUSCULAR; INTRAVENOUS; SUBCUTANEOUS at 14:50

## 2019-07-07 RX ADMIN — FLUVOXAMINE MALEATE 300 MG: 50 TABLET, FILM COATED ORAL at 21:45

## 2019-07-07 RX ADMIN — VALPROATE SODIUM 1000 MG: 100 INJECTION, SOLUTION INTRAVENOUS at 18:31

## 2019-07-07 RX ADMIN — APIXABAN 5 MG: 5 TABLET, FILM COATED ORAL at 08:35

## 2019-07-07 RX ADMIN — LORAZEPAM 2 MG: 1 TABLET ORAL at 08:35

## 2019-07-07 RX ADMIN — ACETAMINOPHEN 650 MG: 325 TABLET ORAL at 05:47

## 2019-07-07 RX ADMIN — SODIUM CHLORIDE 500 MG: 0.9 INJECTION, SOLUTION INTRAVENOUS at 21:53

## 2019-07-07 RX ADMIN — LAMOTRIGINE 200 MG: 100 TABLET ORAL at 08:35

## 2019-07-07 RX ADMIN — ZOLPIDEM TARTRATE 2.5 MG: 5 TABLET, FILM COATED ORAL at 01:39

## 2019-07-07 NOTE — NURSING NOTE
Patient complaining of shortness of breath and dizziness  States that this is something that happens sometimes when she has DHE injections  O2 sat 96% on room air  Requested to be placed on 1L NC so patient was placed on 1L NC for comfort - dizziness and SOB resolved  Dr Otilio Khalil aware  States she still has a headache but sometimes she has to take another 1mg dose of DHE 2 hours after first dose if headache is not resolved  Will page neurology if HA does not resolve  Will continue to monitor

## 2019-07-07 NOTE — CONSULTS
Consultation - Neurology   Nate Bernabe 59 y o  female MRN: [id number]  Unit/Bed#: E4 -01 Encounter: [id number]      Assessment/Plan   Assessment/Plan:  57-year-old female with likely syncope  - agree with echocardiogram   -orthostatics  -supportive care with IV fluids   - PT OT eval and treat  - patient reports compliance with home dose of Lamictal 200 mg daily, despite diminished level of 1 8   - continue home medications  - no additional neurologic recommendations at this time  - assuming workup is negative, and patient is able to ambulate with physical therapy without difficulty and/or recurrent symptoms, then would be cleared for discharge from a neurologic standpoint   - no clear need for new outpatient neurologic follow-up   -please call questions/concerns    History of Present Illness     Reason for Consult / Principal Problem:  Syncope  Hx and PE limited by:  Not applicable  HPI: Nate Bernabe is a 59 y o  right handed female  who presents with multiple episodes of syncope  She reports 3 total episodes in the past several days with associated lightheaded/wooziness, floaters, and collapse  During the event yesterday she hit her head on [employer] and was concerned given her concurrent Eliquis use for DVT  CT of the head and cervical spine both of which were normal    1 events seem to occur while ambulating  The most recent event that occurred yesterday and which precipitated her head strike, was proceeded by a change of position  She was lying in the recliner set forward and felt a little lightheaded and woozy, with defer this to pass, then stood up at which point she had recur current lightheaded, dizziness, and visual floaters that proceeded her fall into the and [facility]  There is a short appeared of time which she cannot account for, suggestive of a loss of consciousness  She denies any incontinence, tongue or cheek bite    Patient denies any numbness, tingling, weakness, heaviness, or clumsiness of a limb,change in vision, hearing, or speech difficulties  She also denies any current headaches  She does report a previous history of syncope  Inpatient consult to Neurology  Consult performed by: Josey Aguilar DO  Consult ordered by: Sylvia Iqbal MD          Review of Systems   Constitutional: Negative for chills and fever  HENT: Negative for facial swelling and hearing loss  Eyes: Negative for pain and discharge  Respiratory: Negative for cough, choking and shortness of breath  Cardiovascular: Negative for chest pain  Gastrointestinal: Negative for constipation, diarrhea, nausea and vomiting  Endocrine: Negative for cold intolerance and heat intolerance  Genitourinary: Negative for difficulty urinating, frequency and urgency  Skin: Negative for color change and rash  Neurological: Positive for syncope  Negative for dizziness, facial asymmetry, weakness, light-headedness, numbness and headaches  All other systems reviewed and are negative        Historical Information   Past Medical History:   Diagnosis Date    Adrenal insufficiency ([place]'s disease) ([facility] [address] )     Bipolar disorder     Cervical radiculopathy     Chronic back pain     DVT, lower extremity ([facility] ) 1991    Fibromyalgia     History of TIAs     cannot remember details    Hypertension     Hypokalemia     Migraine     MRSA (methicillin resistant Staphylococcus aureus) 07/20/2012    nasal swab negative 4/5/19    Psychiatric disorder     Anxiety, major depression, bipolar    Spinal stenosis     Syncope 2014    orthostatic hypotension     Past Surgical History:   Procedure Laterality Date    APPENDECTOMY      GASTRIC RESTRICTION SURGERY      Gastric Sleeve Nov 2015    HYSTERECTOMY      JOINT REPLACEMENT      Left Knee 2011 and Right Hip 2010     Social History   Social History     Substance and Sexual Activity   Alcohol Use Not Currently    Frequency: Never    Binge frequency: Never     Social History     Substance and Sexual Activity   Drug Use No     Social History     Tobacco Use   Smoking Status Former Smoker    Packs/day: 0 20    Types: Cigarettes    Last attempt to quit:     Years since quittin 5   Smokeless Tobacco Never Used     Family History:   Family History   Problem Relation Age of Onset    Breast cancer Mother     Migraines Mother     Arthritis Mother     Kidney disease Father     Heart disease Father     Diabetes Father     Arthritis Father     Brain cancer Brother     Seizures Brother        Review of previous medical records was  completed  Meds/Allergies   all current active meds have been reviewed    Allergies   Allergen Reactions    Ketorolac Itching and Other (See Comments)     [toradol] Itching, hives    Mushroom Extract Complex        Objective   Vitals:Blood pressure 134/74, pulse 78, temperature 98 3 °F (36 8 °C), temperature source Tympanic, resp  rate 18, height 5' 8" (1 727 m), weight 118 kg (261 lb 3 9 oz), SpO2 95 %  ,Body mass index is 39 72 kg/m²  Intake/Output Summary (Last 24 hours) at 2019 1304  Last data filed at 2019 0834  Gross per 24 hour   Intake 2088 75 ml   Output 301 ml   Net 1787 75 ml       Invasive Devices: Invasive Devices     Peripheral Intravenous Line            Peripheral IV 19 Left Hand 1 day                Physical Exam   Constitutional: She appears well-developed and well-nourished  HENT:   Head: Normocephalic and atraumatic  Eyes: Conjunctivae are normal    Cardiovascular: Normal rate and regular rhythm  Pulmonary/Chest: Effort normal and breath sounds normal    Neurological: She is alert  Skin: Skin is warm and dry  Neurologic Exam     Mental Status   - Oriented to person, place, and date  - level of consciousness alert  - follows commands appropriately  - Attention normal  - Fund of knowledge appropriate    - No evidence of Aphasia           Cranial Nerves -Visual Fields full, PERRLA, EOMI  -Fundoscopic exam attempted, unable to visualize disks  -Face is symmetric with respect to motor and sensory   -Audition intact and symmetric    -Tongue, palate, uvula midline   -Shoulder shrug intact and symmetric  Motor Exam - 5/5 strength in all four extremities  - normal bulk throughout  - normal tone throughout     Sensory Exam   Sensation intact to touch, pin, temp, vib, in all four extremities  Gait, Coordination, and Reflexes - DTR's in all four extremities are intact and symmetric   - Toes down-going bilaterally  - No Gross ataxia per finger to nose    - gait defered  Lab Results: I have personally reviewed pertinent reports  Imaging Studies: I have personally reviewed pertinent films in PACS  EKG, Pathology, and Other Studies: I have personally reviewed pertinent reports     and I have personally reviewed pertinent films in PACS  VTE Prophylaxis: Sequential compression device (Venodyne)

## 2019-07-07 NOTE — NURSING NOTE
Spoke with Dr José Manuel Ruiz regarding patient's headache and information in last nursing note  Also about patient's elevated BP  Received verbal orders for 1000mg Depacon once as needed for headache and solumedrol 500mg Q12H as needed for headache  Orders entered while on phone and verbal readback done  Instructed to give Depacon first and if patient continues to have headache can give solumedrol  Will continue to monitor patient

## 2019-07-07 NOTE — SOCIAL WORK
LOS: 0 GMLOS: None indicated  NOT A READMISSION  NOT A BUNDLE     Sunday weekend covering CM received a TT from attending to determine if pt could return to [facility])  Sent the referral to confirm that she can just come right back, but being Sunday, it's unlikely anyone is in the admissions office  CM also called them as well, but it went to [facility] and requested a call back from  today to  weekend number to confirm if she can return, or to f/u with assigned CM on Monday  Also left note for assigned CM in [facility] to f/u       TERESA Kwok  7/7/2019  9:55

## 2019-07-07 NOTE — PROGRESS NOTES
[facility] Progress Note  Patient: Ketan Francisco 59 y o  female   MRN: [id number]  PCP: La Caro DO  Unit/Bed#: E4 -01 Encounter: [id number]  Date Of Visit: 07/07/19    Assessment:    Principal Problem:    Syncope  Active Problems:    Bipolar depression ([facility] )    Intractable migraine    DVT, lower extremity ([facility] )      Plan:    77-year-old female with a history of recurrent syncope today while at her place of residence states she felt dizzy and and leg buckling fell against a entertainment center(she described to the ED physician falling backwards and told me she fell forwards) describing apparent loss of consciousness although her usual syncopal attacks are syncope and collapse and no preceding symptoms of dizziness or lightheadedness  She presented to the ED with concerns of striking her head and being on Eliquis where workup Ng entailed a CT of the cervical spine and brain that was unremarkable for any signs of bleeding she then preceded to have complaints of increasing dizziness unable to obtain upright status and increasing headache described as frontal to her back atypical for history of migraines will that usually have an aura and states herself that this is quite different than her usual migraines  A migraine cocktail given to her had no result  She also complains of some nausea and on obtaining sitting up in litter became further dizzy   Hospital Problem List:     Principal Problem:    Syncope  Active Problems:    Bipolar depression ([facility] )    Intractable migraine    DVT, lower extremity ([facility] )      Plan for the Primary Problem(s):  · Syncope with a history of recurrent syncope of  unclear etiology, she is on numerous psychoactive medications was not no signs of QT prolongation there is some contradictory aspects to her history however    Will  admitted to observation status  · Headache that seems to be more post concussive than her typical migraine and will need to be assess further next 24 hours and have Neurology evaluate I see no signs of head trauma in her scalp and CT imaging was unremarkable of cervical spine and brain/think there is also emotional component involved and may not be organic  · History of DVT right upper extremity after usage of a PICC line will continue on Eliquis as no signs of hemorrhagic changes on CT imaging  · Bipolar depression continue on present meds including Haldol, Luvox of which MAR list she is taking 300 mg q h s  Which is double the maximum dosage also on Zonegran at high dosage which is indicated for focal onset seizures also on Lamictal 200 and Abilify    Plan for Additional Problems:   · Cardiac murmur heard on auscultation she denies having prior history of heart murmur and states she will over a year since she has had an echocardiogram which is hard to believe given the recurrent syncope  she has had but will order 2D echocardiogram last stress test in January 2018 apparently normal doubt cardiac syncope in this setting but will place on telemetry to watch QT intervals  · Ambulatory dysfunction possibly in relation to post concussive state will obtain PT/OT  · Diagnostic differential listed in chart notes adrenal insufficiency but she is not on steroids and no signs of sodium or potassium abnormalities      VTE Pharmacologic Prophylaxis:   Pharmacologic: Apixaban (Eliquis)  Mechanical VTE Prophylaxis in Place: Yes    Discussions with Specialists or Other Care Team Provider:     Time Spent for Care: 30 minutes  More than 50% of total time spent on counseling and coordination of care as described above        Subjective:   Still admits to having headache but details of which change which every visit I make with her in changes with suggestion again admitting to nausea only after I asked her and eating breakfast received Percocet for headache last night I informed her that will not repeat this as would rather not treat post concussive headache with narcotics  Encouraged her to get out of bed today and ambulate    Objective:     Vitals:   Temp (24hrs), Av °F (36 7 °C), Min:97 6 °F (36 4 °C), Max:98 7 °F (37 1 °C)    Temp:  [97 6 °F (36 4 °C)-98 7 °F (37 1 °C)] 97 6 °F (36 4 °C)  HR:  [64-80] 72  Resp:  [16-19] 18  BP: (139-177)/(63-85) 139/80  SpO2:  [93 %-99 %] 95 %  Body mass index is 39 72 kg/m²  Input and Output Summary (last 24 hours): Intake/Output Summary (Last 24 hours) at 2019 0758  Last data filed at 2019 1700  Gross per 24 hour   Intake 1000 ml   Output 1 ml   Net 999 ml       Physical Exam:     Physical Exam:   General appearance: alert, appears stated age and cooperative  Head: Normocephalic, without obvious abnormality, atraumatic  Lungs: clear to auscultation bilaterally  Heart: regular rate and rhythm  Abdomen: soft, non-tender; bowel sounds normal; no masses,  no organomegaly  Back: negative  Extremities: extremities normal, atraumatic, no cyanosis or edema  Neurologic: Grossly normal      Additional Data:     Labs:    Results from last 7 days   Lab Units 19  1154   WBC Thousand/uL 11 43*   HEMOGLOBIN g/dL 9 3*   HEMATOCRIT % 31 4*   PLATELETS Thousands/uL 339   LYMPHO PCT % 25   MONO PCT % 9   EOS PCT % 0     Results from last 7 days   Lab Units 19  1154   POTASSIUM mmol/L 3 6   CHLORIDE mmol/L 107   CO2 mmol/L 28   BUN mg/dL 11   CREATININE mg/dL 0 83   CALCIUM mg/dL 8 5   ALK PHOS U/L 108   ALT U/L 18   AST U/L 30     Results from last 7 days   Lab Units 19  1154   INR  1 15       * I Have Reviewed All Lab Data Listed Above  * Additional Pertinent Lab Tests Reviewed: [facility] Admission Reviewed    Imaging:  Ct Head Without Contrast    Result Date: 2019  Narrative: CT BRAIN - WITHOUT CONTRAST INDICATION:   syncope, fall head injury Eliquis  COMPARISON:  None  TECHNIQUE:  CT examination of the brain was performed    In addition to axial images, coronal 2D reformatted images were created and submitted for interpretation  Radiation dose length product (DLP) for this visit:  987 mGy-cm   This examination, like all CT scans performed in the [facility], was performed utilizing techniques to minimize radiation dose exposure, including the use of iterative reconstruction and automated exposure control  IMAGE QUALITY:  Diagnostic  FINDINGS: PARENCHYMA:  No intracranial mass, mass effect or midline shift  No CT signs of acute infarction  No acute parenchymal hemorrhage  VENTRICLES AND EXTRA-AXIAL SPACES:  Normal for the patient's age  VISUALIZED ORBITS AND PARANASAL SINUSES:  Unremarkable  CALVARIUM AND EXTRACRANIAL SOFT TISSUES:  Normal      Impression: No acute intracranial abnormality  Workstation performed: [id number]     Ct Cervical Spine Without Contrast    Result Date: 7/6/2019  Narrative: CT CERVICAL SPINE - WITHOUT CONTRAST INDICATION:   fall, injury  COMPARISON:  Cervical spine CT 1/4/2018  TECHNIQUE:  CT examination of the cervical spine was performed without intravenous contrast   Contiguous axial images were obtained  Sagittal and coronal reconstructions were performed  Radiation dose length product (DLP) for this visit:  602 mGy-cm   This examination, like all CT scans performed in the [facility], was performed utilizing techniques to minimize radiation dose exposure, including the use of iterative reconstruction and automated exposure control  IMAGE QUALITY:  Diagnostic  FINDINGS: ALIGNMENT:  There is straightening of normal cervical lordosis  No subluxation or compression deformity  VERTEBRAL BODIES:  No fracture  DEGENERATIVE CHANGES:  Mild multilevel cervical degenerative changes are noted without critical central canal stenosis  PREVERTEBRAL AND PARASPINAL SOFT TISSUES:  Unremarkable  THORACIC INLET:  Normal      Impression: No cervical spine fracture or traumatic malalignment    Workstation performed: [id number]     Imaging Reports Reviewed Today Include:  Reviewed CT head and C-spine  Imaging Personally Reviewed by Myself Includes:    Procedure: Ct Head Without Contrast    Result Date: 7/6/2019  Narrative: CT BRAIN - WITHOUT CONTRAST INDICATION:   syncope, fall head injury Eliquis  COMPARISON:  None  TECHNIQUE:  CT examination of the brain was performed  In addition to axial images, coronal 2D reformatted images were created and submitted for interpretation  Radiation dose length product (DLP) for this visit:  987 mGy-cm   This examination, like all CT scans performed in the [facility], was performed utilizing techniques to minimize radiation dose exposure, including the use of iterative reconstruction and automated exposure control  IMAGE QUALITY:  Diagnostic  FINDINGS: PARENCHYMA:  No intracranial mass, mass effect or midline shift  No CT signs of acute infarction  No acute parenchymal hemorrhage  VENTRICLES AND EXTRA-AXIAL SPACES:  Normal for the patient's age  VISUALIZED ORBITS AND PARANASAL SINUSES:  Unremarkable  CALVARIUM AND EXTRACRANIAL SOFT TISSUES:  Normal      Impression: No acute intracranial abnormality  Workstation performed: [id number]     Procedure: Ct Cervical Spine Without Contrast    Result Date: 7/6/2019  Narrative: CT CERVICAL SPINE - WITHOUT CONTRAST INDICATION:   fall, injury  COMPARISON:  Cervical spine CT 1/4/2018  TECHNIQUE:  CT examination of the cervical spine was performed without intravenous contrast   Contiguous axial images were obtained  Sagittal and coronal reconstructions were performed  Radiation dose length product (DLP) for this visit:  602 mGy-cm   This examination, like all CT scans performed in the [facility], was performed utilizing techniques to minimize radiation dose exposure, including the use of iterative reconstruction and automated exposure control  IMAGE QUALITY:  Diagnostic  FINDINGS: ALIGNMENT:  There is straightening of normal cervical lordosis    No subluxation or compression deformity  VERTEBRAL BODIES:  No fracture  DEGENERATIVE CHANGES:  Mild multilevel cervical degenerative changes are noted without critical central canal stenosis  PREVERTEBRAL AND PARASPINAL SOFT TISSUES:  Unremarkable  THORACIC INLET:  Normal      Impression: No cervical spine fracture or traumatic malalignment  Workstation performed: [id number]        Recent Cultures (last 7 days):           Last 24 Hours Medication List:     Current Facility-Administered Medications:  acetaminophen 650 mg Oral Q6H PRN Jayjay Christine PA-C    apixaban 5 mg Oral BID Nan Ferguson MD    ARIPiprazole 5 mg Oral Daily Nan Ferguson MD    fluvoxaMINE 300 mg Oral HS Nan Ferguson MD    gabapentin 600 mg Oral TID Nan Ferguson MD    haloperidol 2 mg Oral Q8H PRN Nan Ferguson MD    lamoTRIgine 200 mg Oral Daily Nan Ferguson MD    LORazepam 2 mg Oral TID Nan Ferguson MD    ondansetron 4 mg Intravenous Q6H PRN Nan Ferguson MD    sodium chloride 75 mL/hr Intravenous Continuous Nan Ferguson MD Last Rate: 75 mL/hr (07/06/19 1803)   zolpidem 2 5 mg Oral HS PRN Jayjay Christine PA-C    zonisamide 200 mg Oral Daily Nan Ferguson MD         Today, Patient Was Seen By: Nan Ferguson MD    ** Please Note: Dragon 360 Dictation voice to text software may have been used in the creation of this document   **

## 2019-07-07 NOTE — UTILIZATION REVIEW
Initial Clinical Review    Admission: Date/Time/Statement: OBSERVATION 7/6/19 @1629    Orders Placed This Encounter   Procedures    Place in Observation     Standing Status:   Standing     Number of Occurrences:   1     Order Specific Question:   Admitting Physician     Answer:   nAa Laura Bullard     Order Specific Question:   Level of Care     Answer:   Med Surg [16]     ED Arrival Information     Expected Arrival Acuity Means of Arrival Escorted By Service Admission Type    - 7/6/2019 11:36 [facility]    Arrival Complaint    -        Chief Complaint   Patient presents with    Syncope     Pt states she had a syncopal episode at home approx 45 min ago  Pt states this frequnetly hapens to her and they cannot figure out why  Pt is on eliquis and states she hit her head on the entertainement stamd  No laceration noted  Pt denies loc      Assessment/Plan: 58 yo fem to ED from home admitted under observation due to syncope  75-year-old female with a history of recurrent syncope today while at her place of residence states she felt dizzy and and leg buckling fell against a entertainment center(she described to the ED physician falling backwards and told me she fell from words) describing apparent loss of consciousness although her usual syncopal attacks are syncope and collapse and no preceding symptoms of dizziness or lightheadedness  She presented to the ED with concerns of striking her head and being on Eliquis where workup Ng entailed a CT of the cervical spine and brain that was unremarkable for any signs of bleeding she then preceded to have complaints of increasing dizziness unable to obtain upright status and increasing headache described as frontal to her back atypical for history of migraines will that usually have an aura and states herself that this is quite different than her usual migraines  A migraine cocktail given to her had no result    She also complains of some nausea and on obtaining sitting up in litter became further dizzy   Plan for the Primary Problem(s):  · Syncope with a history of recurrent syncope of  unclear etiology, she is on numerous psychoactive medications was not no signs of QT prolongation there is some contradictory aspects to her history however  Will at admitted to observation status  ? Headache that seems to be more post concussive than her typical migraine and will need to be assess further next 24 hours and have Neurology evaluate I see no signs of head trauma in her scalp and CT imaging was unremarkable of cervical spine and brain  ? History of DVT right upper extremity after usage of a PICC line will continue on Eliquis as no signs of hemorrhagic changes on CT imaging  ? Bipolar depression continue on present meds including Haldol, Luvox of which MAR list she is taking 300 mg q h s   Which is double the maximum dosage also on Zonegran at high dosage which is indicated for focal onset seizures also on Lamictal 200 and Abilify     Plan for Additional Problems:   · Cardiac murmur heard on auscultation she denies having prior history of heart murmur and states she will over a year since she has had an echocardiogram which is hard to believe given the recurrent syncope  she has had but will order 2D echocardiogram last stress test in January 2018 apparently normal doubt cardiac syncope in this setting but will place on telemetry to watch QT intervals  · Ambulatory dysfunction possibly in relation to post concussive state will obtain PT/OT  · Diagnostic differential listed in chart notes adrenal insufficiency but she is not on steroids and no signs of sodium or potassium abnormalities    7/7 per med: Still admits to having headache but details of which change which every visit I make with her in changes with suggestion again admitting to nausea only after I asked her and eating breakfast received Percocet for headache last night I informed her that will not repeat this as would rather not treat post concussive headache with narcotics  Encouraged her to get out of bed today and ambulate    7/7 per neuro: 59-year-old female with likely syncope  - agree with echocardiogram   -orthostatics  -supportive care with IV fluids   - PT OT eval and treat  - patient reports compliance with home dose of Lamictal 200 mg daily, despite diminished level of 1 8   - continue home medications  - no additional neurologic recommendations at this time  - assuming workup is negative, and patient is able to ambulate with physical therapy without difficulty and/or recurrent symptoms, then would be cleared for discharge from a neurologic standpoint   - no clear need for new outpatient neurologic follow-up  Called by nurse as patient is reporting onset of migraine  She reports she normally takes her DHE injectable Tomas, 1 mg/mL, 1 mg at home  Will order DHE 1 mg    ED Triage Vitals   Temperature Pulse Respirations Blood Pressure SpO2   07/06/19 1143 07/06/19 1143 07/06/19 1143 07/06/19 1143 07/06/19 1143   98 °F (36 7 °C) 74 16 (!) 177/85 93 %      Temp Source Heart Rate Source Patient Position - Orthostatic VS BP Location FiO2 (%)   07/06/19 1143 07/06/19 1143 07/06/19 1237 07/06/19 1143 --   Temporal Monitor Lying Right arm       Pain Score       07/06/19 1237       Worst Possible Pain        Wt Readings from Last 1 Encounters:   07/06/19 118 kg (261 lb 3 9 oz)     Additional Vital Signs:   Date/Time  Temp  Pulse  Resp  BP  SpO2  O2 Device  Patient Position - Orthostatic VS   07/07/19 0816  97 5 °F (36 4 °C)  88  21  106/66  98 %  None (Room air)  Sitting   07/06/19 2316  97 6 °F (36 4 °C)  72  18  139/80  95 %  None (Room air)  Lying   07/06/19 1928  98 7 °F (37 1 °C)  78  19  173/75Abnormal   93 %  None (Room air)  Lying   07/06/19 1735  97 6 °F (36 4 °C)  64  18  170/74  97 %  None (Room air)  Sitting   07/06/19 1631    65  18  143/67  97 %  None (Room air)  Lying   07/06/19 1449    80  18  139/65  99 %  None (Room air)  Lying   07/06/19 1447    74  18  145/63  97 %  None (Room air)  Sitting   07/06/19 1445    66  18  140/63  98 %  None (Room air)  Lying   07/06/19 1348    69  16  166/79  98 %  None (Room air)         Pertinent Labs/Diagnostic Test Results:   7/6 CT head: nothing acute  7/6 CT c spine: no fx or malalignment  7/6 EKG: junctional vs  Sinus rhythm  Results from last 7 days   Lab Units 07/06/19  1154   WBC Thousand/uL 11 43*   HEMOGLOBIN g/dL 9 3*   HEMATOCRIT % 31 4*   PLATELETS Thousands/uL 339   BANDS PCT % 2     Results from last 7 days   Lab Units 07/06/19  1154   SODIUM mmol/L 144   POTASSIUM mmol/L 3 6   CHLORIDE mmol/L 107   CO2 mmol/L 28   ANION GAP mmol/L 9   BUN mg/dL 11   CREATININE mg/dL 0 83   EGFR ml/min/1 73sq m 77   CALCIUM mg/dL 8 5     Results from last 7 days   Lab Units 07/06/19  1154   AST U/L 30   ALT U/L 18   ALK PHOS U/L 108   TOTAL PROTEIN g/dL 6 5   ALBUMIN g/dL 2 8*   TOTAL BILIRUBIN mg/dL 0 17*     Results from last 7 days   Lab Units 07/06/19  1154   GLUCOSE RANDOM mg/dL 90     Results from last 7 days   Lab Units 07/06/19  1154   TROPONIN I ng/mL <0 02     Results from last 7 days   Lab Units 07/06/19  1154   PROTIME seconds 14 9*   INR  1 15     ED Treatment:   Medication Administration from 07/06/2019 1136 to 07/06/2019 1659       Date/Time Order Dose Route Action     07/06/2019 1453 acetaminophen (TYLENOL) tablet 650 mg 650 mg Oral Given     07/06/2019 1531 sodium chloride 0 9 % bolus 1,000 mL 1,000 mL Intravenous New Bag     07/06/2019 1632 metoclopramide (REGLAN) injection 10 mg 10 mg Intravenous Given     07/06/2019 1632 diphenhydrAMINE (BENADRYL) injection 25 mg 25 mg Intravenous Given     07/06/2019 1632 dexamethasone (DECADRON) injection 10 mg 10 mg Intravenous Given        Past Medical History:   Diagnosis Date    Adrenal insufficiency (Stanhope's disease) ([address] )     Bipolar disorder     Cervical radiculopathy     Chronic back pain     DVT, lower extremity [facility] ) 1991    Fibromyalgia     History of TIAs     cannot remember details    Hypertension     Hypokalemia     Migraine     MRSA (methicillin resistant Staphylococcus aureus) 07/20/2012    nasal swab negative 4/5/19    Psychiatric disorder     Anxiety, major depression, bipolar    Spinal stenosis     Syncope 2014    orthostatic hypotension     Present on Admission:   Syncope   B[facility] depression [facility]   Intractable migraine   DVT, lower extremity ([facility] )      Admitting Diagnosis: Syncope [R55]  Anemia [D64 9]  Headache [R51]  Ambulatory dysfunction [R26 2]  Age/Sex: 59 y o  female  Admission Orders:    Current Facility-Administered Medications:  acetaminophen 650 mg Oral Q6H PRN x 2   apixaban 5 mg Oral BID   ARIPiprazole 5 mg Oral Daily   fluvoxaMINE 300 mg Oral HS   gabapentin 600 mg Oral TID   haloperidol 2 mg Oral Q8H PRN x 1 on 7/6   lamoTRIgine 200 mg Oral Daily   LORazepam 2 mg Oral TID   ondansetron 4 mg Intravenous Q6H PRN x 1 on 7/7   zolpidem 2 5 mg Oral HS PRN x 1 on 7/7   zonisamide 200 mg Oral Daily   sodium chloride 0 9 % infusion   Rate: 75 mL/hr Dose: 75 mL/hr  Freq: Continuous Route: IV  Last Dose: Stopped (07/07/19 0834)  Start: 07/06/19 1700 End: 07/07/19 0807  Neuro checks q4h  SCD's  tele    IP CONSULT TO NEUROLOGY    Network Utilization Review Department  Phone: [phone]; Fax [phone]  [email or web address]  ATTENTION: Please call with any questions or concerns to [phone]  and carefully listen to the prompts so that you are directed to the right person  Send all requests for admission clinical reviews, approved or denied determinations and any other requests to fax [phone]   All voicemails are confidential

## 2019-07-07 NOTE — UTILIZATION REVIEW
Notification of Observation Care Status/Observation Authorization Request    This is a Notification of Observation Care Status to our facility [address]  Be advised that this patient was admitted to our facility under Observation Status  Please contact the Utilization Review Department where the patient is receiving care services for additional admission information  Place of Service Code: 25  Place of Service Name: [facility]  CPT Code for Observation: CPT [place] End / CPT 22248    Presentation Date & Time: 7/6/2019 11:39 AM  Observation Admission Date & Time: 7/6/2019 4:29 PM   Discharge Date & Time: No discharge date for patient encounter  Discharge Disposition (if discharged): Home/Self Care  Attending Physician: Kym Mancer[address] Jordy Grossman [[id number]]  Admission Orders (From admission, onward)    Ordered        07/06/19 1629  Place in Observation  Once           Facility: [facility] Utilization Review Department  Phone: [phone]; Fax [phone]  [email or web address]  ATTENTION: Please call with any questions or concerns to [phone]  and carefully listen to the prompts so that you are directed to the right person  Send all requests for admission clinical reviews, approved or denied determinations and any other requests to fax [phone]   All voicemails are confidential

## 2019-07-07 NOTE — PLAN OF CARE
Problem: Potential for Falls  Goal: Patient will remain free of falls  Description  INTERVENTIONS:  - Assess patient frequently for physical needs  -  Identify cognitive and physical deficits and behaviors that affect risk of falls    -  [place] fall precautions as indicated by assessment   - Educate patient/family on patient safety including physical limitations  - Instruct patient to call for assistance with activity based on assessment  - Modify environment to reduce risk of injury  - Consider OT/PT consult to assist with strengthening/mobility  Outcome: Progressing     Problem: PAIN - ADULT  Goal: Verbalizes/displays adequate comfort level or baseline comfort level  Description  Interventions:  - Encourage patient to monitor pain and request assistance  - Assess pain using appropriate pain scale  - Administer analgesics based on type and severity of pain and evaluate response  - Implement non-pharmacological measures as appropriate and evaluate response  - Consider cultural and social influences on pain and pain management  - Notify physician/advanced practitioner if interventions unsuccessful or patient reports new pain  Outcome: Progressing     Problem: SAFETY ADULT  Goal: Maintain or return to baseline ADL function  Description  INTERVENTIONS:  -  Assess patient's ability to carry out ADLs; assess patient's baseline for ADL function and identify physical deficits which impact ability to perform ADLs (bathing, care of mouth/teeth, toileting, grooming, dressing, etc )  - Assess/evaluate cause of self-care deficits   - Assess range of motion  - Assess patient's mobility; develop plan if impaired  - Assess patient's need for assistive devices and provide as appropriate  - Encourage maximum independence but intervene and supervise when necessary  ¯ Involve family in performance of ADLs  ¯ Assess for home care needs following discharge   ¯ Request OT consult to assist with ADL evaluation and planning for discharge  ¯ Provide patient education as appropriate  Outcome: Progressing  Goal: Maintain or return mobility status to optimal level  Description  INTERVENTIONS:  - Assess patient's baseline mobility status (ambulation, transfers, stairs, etc )    - Identify cognitive and physical deficits and behaviors that affect mobility  - Identify mobility aids required to assist with transfers and/or ambulation (gait belt, sit-to-stand, lift, walker, cane, etc )  - [place] fall precautions as indicated by assessment  - Record patient progress and toleration of activity level on Mobility SBAR; progress patient to next Phase/Stage  - Instruct patient to call for assistance with activity based on assessment  - Request Rehabilitation consult to assist with strengthening/weightbearing, etc   Outcome: Progressing     Problem: NEUROSENSORY - ADULT  Goal: Achieves stable or improved neurological status  Description  INTERVENTIONS  - Monitor and report changes in neurological status  - Initiate measures to prevent increased intracranial pressure  - Maintain blood pressure and fluid volume within ordered parameters to optimize cerebral perfusion  - Monitor temperature, glucose, and sodium or any other associated labs   Initiate appropriate interventions as ordered  - Monitor for seizure activity   - Administer anti-seizure medications as ordered  Outcome: Progressing  Goal: Achieves maximal functionality and self care  Description  INTERVENTIONS  - Monitor swallowing and airway patency with patient fatigue and changes in neurological status  - Encourage and assist patient to increase activity and self care with guidance from rehab services  - Encourage visually impaired, hearing impaired and aphasic patients to use assistive/communication devices  Outcome: Progressing

## 2019-07-07 NOTE — NURSING NOTE
Patient sleeping upon entering room with incoming shift RN  IV [place] infusing  Patient states her headache is much better

## 2019-07-07 NOTE — PLAN OF CARE
Problem: Potential for Falls  Goal: Patient will remain free of falls  Description  INTERVENTIONS:  - Assess patient frequently for physical needs  -  Identify cognitive and physical deficits and behaviors that affect risk of falls    -  [place] fall precautions as indicated by assessment   - Educate patient/family on patient safety including physical limitations  - Instruct patient to call for assistance with activity based on assessment  - Modify environment to reduce risk of injury  - Consider OT/PT consult to assist with strengthening/mobility  Outcome: Progressing     Problem: PAIN - ADULT  Goal: Verbalizes/displays adequate comfort level or baseline comfort level  Description  Interventions:  - Encourage patient to monitor pain and request assistance  - Assess pain using appropriate pain scale  - Administer analgesics based on type and severity of pain and evaluate response  - Implement non-pharmacological measures as appropriate and evaluate response  - Consider cultural and social influences on pain and pain management  - Notify physician/advanced practitioner if interventions unsuccessful or patient reports new pain  Outcome: Progressing     Problem: SAFETY ADULT  Goal: Maintain or return to baseline ADL function  Description  INTERVENTIONS:  -  Assess patient's ability to carry out ADLs; assess patient's baseline for ADL function and identify physical deficits which impact ability to perform ADLs (bathing, care of mouth/teeth, toileting, grooming, dressing, etc )  - Assess/evaluate cause of self-care deficits   - Assess range of motion  - Assess patient's mobility; develop plan if impaired  - Assess patient's need for assistive devices and provide as appropriate  - Encourage maximum independence but intervene and supervise when necessary  ¯ Involve family in performance of ADLs  ¯ Assess for home care needs following discharge   ¯ Request OT consult to assist with ADL evaluation and planning for discharge  ¯ Provide patient education as appropriate  Outcome: Progressing  Goal: Maintain or return mobility status to optimal level  Description  INTERVENTIONS:  - Assess patient's baseline mobility status (ambulation, transfers, stairs, etc )    - Identify cognitive and physical deficits and behaviors that affect mobility  - Identify mobility aids required to assist with transfers and/or ambulation (gait belt, sit-to-stand, lift, walker, cane, etc )  - [place] fall precautions as indicated by assessment  - Record patient progress and toleration of activity level on Mobility SBAR; progress patient to next Phase/Stage  - Instruct patient to call for assistance with activity based on assessment  - Request Rehabilitation consult to assist with strengthening/weightbearing, etc   Outcome: Progressing     Problem: NEUROSENSORY - ADULT  Goal: Achieves stable or improved neurological status  Description  INTERVENTIONS  - Monitor and report changes in neurological status  - Initiate measures to prevent increased intracranial pressure  - Maintain blood pressure and fluid volume within ordered parameters to optimize cerebral perfusion  - Monitor temperature, glucose, and sodium or any other associated labs   Initiate appropriate interventions as ordered  - Monitor for seizure activity   - Administer anti-seizure medications as ordered  Outcome: Progressing  Goal: Achieves maximal functionality and self care  Description  INTERVENTIONS  - Monitor swallowing and airway patency with patient fatigue and changes in neurological status  - Encourage and assist patient to increase activity and self care with guidance from rehab services  - Encourage visually impaired, hearing impaired and aphasic patients to use assistive/communication devices  Outcome: Progressing     Problem: CARDIOVASCULAR - ADULT  Goal: Maintains optimal cardiac output and hemodynamic stability  Description  INTERVENTIONS:  - Monitor I/O, vital signs and rhythm  - Monitor for S/S and trends of decreased cardiac output i e  bleeding, hypotension  - Administer and titrate ordered vasoactive medications to optimize hemodynamic stability  - Assess quality of pulses, skin color and temperature  - Assess for signs of decreased coronary artery perfusion - ex   Angina  - Instruct patient to report change in severity of symptoms  Outcome: Progressing  Goal: Absence of cardiac dysrhythmias or at baseline rhythm  Description  INTERVENTIONS:  - Continuous cardiac monitoring, monitor vital signs, obtain 12 lead EKG if indicated  - Administer antiarrhythmic and heart rate control medications as ordered  - Monitor electrolytes and administer replacement therapy as ordered  Outcome: Progressing     Problem: METABOLIC, FLUID AND ELECTROLYTES - ADULT  Goal: Electrolytes maintained within normal limits  Description  INTERVENTIONS:  - Monitor labs and assess patient for signs and symptoms of electrolyte imbalances  - Administer electrolyte replacement as ordered  - Monitor response to electrolyte replacements, including repeat lab results as appropriate  - Instruct patient on fluid and nutrition as appropriate  Outcome: Progressing  Goal: Fluid balance maintained  Description  INTERVENTIONS:  - Monitor labs and assess for signs and symptoms of volume excess or deficit  - Monitor I/O and WT  - Instruct patient on fluid and nutrition as appropriate  Outcome: Progressing     Problem: SKIN/TISSUE INTEGRITY - ADULT  Goal: Skin integrity remains intact  Description  INTERVENTIONS  - Identify patients at risk for skin breakdown  - Assess and monitor skin integrity  - Assess and monitor nutrition and hydration status  - Monitor labs (i e  albumin)  - Assess for incontinence   - Turn and reposition patient  - Assist with mobility/ambulation  - Relieve pressure over bony prominences  - Avoid friction and shearing  - Provide appropriate hygiene as needed including keeping skin clean and dry  - Evaluate need for skin moisturizer/barrier cream  - Collaborate with interdisciplinary team (i e  Nutrition, Rehabilitation, etc )   - Patient/family teaching  Outcome: Progressing

## 2019-07-07 NOTE — QUICK NOTE
Called by nurse as patient is reporting onset of migraine  She reports she normally takes her DHE injectable [place], 1 mg/mL, 1 mg at home      Will order DHE 1 mg

## 2019-07-08 ENCOUNTER — APPOINTMENT (OUTPATIENT)
Dept: NON INVASIVE DIAGNOSTICS | Facility: HOSPITAL | Age: 59
DRG: 103 | End: 2019-07-08
Payer: COMMERCIAL

## 2019-07-08 LAB
BASOPHILS # BLD AUTO: 0.01 THOUSANDS/ΜL (ref 0–0.1)
BASOPHILS NFR BLD AUTO: 0 % (ref 0–1)
EOSINOPHIL # BLD AUTO: 0 THOUSAND/ΜL (ref 0–0.61)
EOSINOPHIL NFR BLD AUTO: 0 % (ref 0–6)
ERYTHROCYTE [DISTWIDTH] IN BLOOD BY AUTOMATED COUNT: 19.9 % (ref 11.6–15.1)
HCT VFR BLD AUTO: 33.4 % (ref 34.8–46.1)
HGB BLD-MCNC: 10.2 G/DL (ref 11.5–15.4)
IMM GRANULOCYTES # BLD AUTO: 0.24 THOUSAND/UL (ref 0–0.2)
IMM GRANULOCYTES NFR BLD AUTO: 2 % (ref 0–2)
LYMPHOCYTES # BLD AUTO: 0.82 THOUSANDS/ΜL (ref 0.6–4.47)
LYMPHOCYTES NFR BLD AUTO: 7 % (ref 14–44)
MCH RBC QN AUTO: 25 PG (ref 26.8–34.3)
MCHC RBC AUTO-ENTMCNC: 30.5 G/DL (ref 31.4–37.4)
MCV RBC AUTO: 82 FL (ref 82–98)
MONOCYTES # BLD AUTO: 0.05 THOUSAND/ΜL (ref 0.17–1.22)
MONOCYTES NFR BLD AUTO: 0 % (ref 4–12)
NEUTROPHILS # BLD AUTO: 11.11 THOUSANDS/ΜL (ref 1.85–7.62)
NEUTS SEG NFR BLD AUTO: 91 % (ref 43–75)
NRBC BLD AUTO-RTO: 0 /100 WBCS
PLATELET # BLD AUTO: 339 THOUSANDS/UL (ref 149–390)
PMV BLD AUTO: 9.7 FL (ref 8.9–12.7)
RBC # BLD AUTO: 4.08 MILLION/UL (ref 3.81–5.12)
WBC # BLD AUTO: 12.23 THOUSAND/UL (ref 4.31–10.16)

## 2019-07-08 PROCEDURE — 97163 PT EVAL HIGH COMPLEX 45 MIN: CPT

## 2019-07-08 PROCEDURE — 99225 PR SBSQ OBSERVATION CARE/DAY 25 MINUTES: CPT | Performed by: INTERNAL MEDICINE

## 2019-07-08 PROCEDURE — 85025 COMPLETE CBC W/AUTO DIFF WBC: CPT | Performed by: INTERNAL MEDICINE

## 2019-07-08 PROCEDURE — G8979 MOBILITY GOAL STATUS: HCPCS

## 2019-07-08 PROCEDURE — G8978 MOBILITY CURRENT STATUS: HCPCS

## 2019-07-08 PROCEDURE — 99232 SBSQ HOSP IP/OBS MODERATE 35: CPT | Performed by: NURSE PRACTITIONER

## 2019-07-08 RX ORDER — DIPHENHYDRAMINE HYDROCHLORIDE 50 MG/ML
25 INJECTION INTRAMUSCULAR; INTRAVENOUS EVERY 6 HOURS PRN
Status: DISCONTINUED | OUTPATIENT
Start: 2019-07-08 | End: 2019-07-09 | Stop reason: HOSPADM

## 2019-07-08 RX ORDER — MAGNESIUM SULFATE 1 G/100ML
1 INJECTION INTRAVENOUS DAILY
Status: COMPLETED | OUTPATIENT
Start: 2019-07-08 | End: 2019-07-08

## 2019-07-08 RX ADMIN — ONDANSETRON 4 MG: 2 INJECTION INTRAMUSCULAR; INTRAVENOUS at 18:08

## 2019-07-08 RX ADMIN — DIPHENHYDRAMINE HYDROCHLORIDE 25 MG: 50 INJECTION, SOLUTION INTRAMUSCULAR; INTRAVENOUS at 11:57

## 2019-07-08 RX ADMIN — LAMOTRIGINE 200 MG: 100 TABLET ORAL at 08:35

## 2019-07-08 RX ADMIN — GABAPENTIN 600 MG: 300 CAPSULE ORAL at 16:52

## 2019-07-08 RX ADMIN — GABAPENTIN 600 MG: 300 CAPSULE ORAL at 08:35

## 2019-07-08 RX ADMIN — ZOLPIDEM TARTRATE 2.5 MG: 5 TABLET, FILM COATED ORAL at 21:31

## 2019-07-08 RX ADMIN — DIPHENHYDRAMINE HYDROCHLORIDE 25 MG: 50 INJECTION, SOLUTION INTRAMUSCULAR; INTRAVENOUS at 18:08

## 2019-07-08 RX ADMIN — ZONISAMIDE 200 MG: 100 CAPSULE ORAL at 08:36

## 2019-07-08 RX ADMIN — GABAPENTIN 600 MG: 300 CAPSULE ORAL at 21:27

## 2019-07-08 RX ADMIN — MAGNESIUM SULFATE HEPTAHYDRATE 1 G: 1 INJECTION, SOLUTION INTRAVENOUS at 11:56

## 2019-07-08 RX ADMIN — LORAZEPAM 2 MG: 1 TABLET ORAL at 16:52

## 2019-07-08 RX ADMIN — FLUVOXAMINE MALEATE 300 MG: 50 TABLET, FILM COATED ORAL at 21:26

## 2019-07-08 RX ADMIN — LORAZEPAM 2 MG: 1 TABLET ORAL at 21:27

## 2019-07-08 RX ADMIN — APIXABAN 5 MG: 5 TABLET, FILM COATED ORAL at 08:35

## 2019-07-08 RX ADMIN — SODIUM CHLORIDE 500 MG: 0.9 INJECTION, SOLUTION INTRAVENOUS at 10:29

## 2019-07-08 RX ADMIN — LORAZEPAM 2 MG: 1 TABLET ORAL at 08:35

## 2019-07-08 RX ADMIN — ACETAMINOPHEN 650 MG: 325 TABLET ORAL at 21:27

## 2019-07-08 RX ADMIN — ACETAMINOPHEN 650 MG: 325 TABLET ORAL at 06:32

## 2019-07-08 RX ADMIN — ONDANSETRON 4 MG: 2 INJECTION INTRAMUSCULAR; INTRAVENOUS at 06:32

## 2019-07-08 RX ADMIN — ARIPIPRAZOLE 5 MG: 5 TABLET ORAL at 08:36

## 2019-07-08 RX ADMIN — APIXABAN 5 MG: 5 TABLET, FILM COATED ORAL at 17:00

## 2019-07-08 NOTE — PLAN OF CARE
Problem: PHYSICAL THERAPY ADULT  Goal: Performs mobility at highest level of function for planned discharge setting  See evaluation for individualized goals  Description  Treatment/Interventions: Functional transfer training, LE strengthening/ROM, Elevations, Therapeutic exercise, Endurance training, Patient/family training, Gait training, Bed mobility, Spoke to nursing  Equipment Recommended: Walker(RW)       See flowsheet documentation for full assessment, interventions and recommendations  Note:   Prognosis: Good  Problem List: Decreased strength, Decreased endurance, Impaired balance, Decreased mobility, Decreased range of motion, Obesity, Pain  Assessment: Pt  59 y  o female admitted for Syncope w/ migraine headache  ECHO pending  Pt referred to PT for mobility assessment & D/C planning w/ orders of activity as tolerated  PTA, pt reports being I w/ occasional SPC or RW use   On eval, pt demonstrate dec mobility, balance, endurance & amb  Pt require S for most functional mobility + cues for techniques  Gait deviations as above, slow & shuffling but no gross LOB noted  Pt state that this has been her normal gait 2* to needing a R hip revision  Slight dizziness reported towards end of amb but no LOC  Nsg staff most recent vital signs as follows: /74 (BP Location: Right arm)   Pulse 77   Temp 98 2 °F (36 8 °C) (Tympanic)   Resp 18   Ht 5' 8" (1 727 m)   Wt 118 kg (261 lb 3 9 oz)   SpO2 95%   BMI 39 72 kg/m²   At end of session, pt back in bed w/o issues, call bell & phone in reach  Fall precautions reinforced w/ good understanding  Pt functioning minimally below baseline hence will continue skilled PT to improve function & safety  At this time, will anticipate good progress in PT for safe D/C to home  Pt will benefit from OPPT & family support at D/C  CM to follow   Nsg staff to continue to mobilized pt (OOB in chair for all meals & ambulate in room/unit) as tolerated to prevent further decline in function  Nsg notified  Barriers to Discharge: None     Recommendation: Outpatient PT, Home with family support          See flowsheet documentation for full assessment

## 2019-07-08 NOTE — PLAN OF CARE
Problem: Potential for Falls  Goal: Patient will remain free of falls  Description  INTERVENTIONS:  - Assess patient frequently for physical needs  -  Identify cognitive and physical deficits and behaviors that affect risk of falls    -  [place] fall precautions as indicated by assessment   - Educate patient/family on patient safety including physical limitations  - Instruct patient to call for assistance with activity based on assessment  - Modify environment to reduce risk of injury  - Consider OT/PT consult to assist with strengthening/mobility  Outcome: Progressing     Problem: PAIN - ADULT  Goal: Verbalizes/displays adequate comfort level or baseline comfort level  Description  Interventions:  - Encourage patient to monitor pain and request assistance  - Assess pain using appropriate pain scale  - Administer analgesics based on type and severity of pain and evaluate response  - Implement non-pharmacological measures as appropriate and evaluate response  - Consider cultural and social influences on pain and pain management  - Notify physician/advanced practitioner if interventions unsuccessful or patient reports new pain  Outcome: Progressing     Problem: SAFETY ADULT  Goal: Maintain or return to baseline ADL function  Description  INTERVENTIONS:  -  Assess patient's ability to carry out ADLs; assess patient's baseline for ADL function and identify physical deficits which impact ability to perform ADLs (bathing, care of mouth/teeth, toileting, grooming, dressing, etc )  - Assess/evaluate cause of self-care deficits   - Assess range of motion  - Assess patient's mobility; develop plan if impaired  - Assess patient's need for assistive devices and provide as appropriate  - Encourage maximum independence but intervene and supervise when necessary  ¯ Involve family in performance of ADLs  ¯ Assess for home care needs following discharge   ¯ Request OT consult to assist with ADL evaluation and planning for discharge  ¯ Provide patient education as appropriate  Outcome: Progressing  Goal: Maintain or return mobility status to optimal level  Description  INTERVENTIONS:  - Assess patient's baseline mobility status (ambulation, transfers, stairs, etc )    - Identify cognitive and physical deficits and behaviors that affect mobility  - Identify mobility aids required to assist with transfers and/or ambulation (gait belt, sit-to-stand, lift, walker, cane, etc )  - [place] fall precautions as indicated by assessment  - Record patient progress and toleration of activity level on Mobility SBAR; progress patient to next Phase/Stage  - Instruct patient to call for assistance with activity based on assessment  - Request Rehabilitation consult to assist with strengthening/weightbearing, etc   Outcome: Progressing     Problem: NEUROSENSORY - ADULT  Goal: Achieves stable or improved neurological status  Description  INTERVENTIONS  - Monitor and report changes in neurological status  - Initiate measures to prevent increased intracranial pressure  - Maintain blood pressure and fluid volume within ordered parameters to optimize cerebral perfusion  - Monitor temperature, glucose, and sodium or any other associated labs   Initiate appropriate interventions as ordered  - Monitor for seizure activity   - Administer anti-seizure medications as ordered  Outcome: Progressing  Goal: Achieves maximal functionality and self care  Description  INTERVENTIONS  - Monitor swallowing and airway patency with patient fatigue and changes in neurological status  - Encourage and assist patient to increase activity and self care with guidance from rehab services  - Encourage visually impaired, hearing impaired and aphasic patients to use assistive/communication devices  Outcome: Progressing     Problem: CARDIOVASCULAR - ADULT  Goal: Maintains optimal cardiac output and hemodynamic stability  Description  INTERVENTIONS:  - Monitor I/O, vital signs and rhythm  - Monitor for S/S and trends of decreased cardiac output i e  bleeding, hypotension  - Administer and titrate ordered vasoactive medications to optimize hemodynamic stability  - Assess quality of pulses, skin color and temperature  - Assess for signs of decreased coronary artery perfusion - ex   Angina  - Instruct patient to report change in severity of symptoms  Outcome: Progressing  Goal: Absence of cardiac dysrhythmias or at baseline rhythm  Description  INTERVENTIONS:  - Continuous cardiac monitoring, monitor vital signs, obtain 12 lead EKG if indicated  - Administer antiarrhythmic and heart rate control medications as ordered  - Monitor electrolytes and administer replacement therapy as ordered  Outcome: Progressing     Problem: METABOLIC, FLUID AND ELECTROLYTES - ADULT  Goal: Electrolytes maintained within normal limits  Description  INTERVENTIONS:  - Monitor labs and assess patient for signs and symptoms of electrolyte imbalances  - Administer electrolyte replacement as ordered  - Monitor response to electrolyte replacements, including repeat lab results as appropriate  - Instruct patient on fluid and nutrition as appropriate  Outcome: Progressing  Goal: Fluid balance maintained  Description  INTERVENTIONS:  - Monitor labs and assess for signs and symptoms of volume excess or deficit  - Monitor I/O and WT  - Instruct patient on fluid and nutrition as appropriate  Outcome: Progressing     Problem: SKIN/TISSUE INTEGRITY - ADULT  Goal: Skin integrity remains intact  Description  INTERVENTIONS  - Identify patients at risk for skin breakdown  - Assess and monitor skin integrity  - Assess and monitor nutrition and hydration status  - Monitor labs (i e  albumin)  - Assess for incontinence   - Turn and reposition patient  - Assist with mobility/ambulation  - Relieve pressure over bony prominences  - Avoid friction and shearing  - Provide appropriate hygiene as needed including keeping skin clean and dry  - Evaluate need for skin moisturizer/barrier cream  - Collaborate with interdisciplinary team (i e  Nutrition, Rehabilitation, etc )   - Patient/family teaching  Outcome: Progressing

## 2019-07-08 NOTE — PROGRESS NOTES
Neurology Progress Note - Doron Ge 1960, 59 y o  female   MRN: [id number] Unit/Bed#: E4 -01 Encounter: [id number]        Intractable migraine  Assessment & Plan  The patient reports are her headache/migraine today started again after did seem to improve some overnight  She reports she is currently an 8/10  She had a dose of DHE yesterday a m  Hesitant to use it again today so soon after she had a prior dose  She reports her migraine is a it over 10 right now she has no tachycardia facial signs of headache pain  I have asked her to turn off her TV and use local measures as well such as ice which she reports she has used before with relief  Additionally I have also given her an infusion of magnesium  I am hesitant to use the Depakote as she is already on Lamictal at this time  * Syncope  Assessment & Plan  No further episodes this point her echocardiogram is pending  A question of her syncopal episode may have been triggered by her migraines  Subjective/Objective     Subjective:  I have a migraine right now  ROS:  The patient reports that she currently has a migraine  She reports that it started sometime this morning  She reports her pain as being an 8/10  Some she reports that the pain is more anterior and in her crown although she or so endorses in reports cervical pain when questioned specifically  She reports no chest pain or shortness of breath  No weakness or dysesthesias  She reports no other complaints with a cued query  The remainder was negative        Current Facility-Administered Medications:  acetaminophen 650 mg Oral Q6H PRN   apixaban 5 mg Oral BID   ARIPiprazole 5 mg Oral Daily   diphenhydrAMINE 25 mg Intravenous Q6H PRN   fluvoxaMINE 300 mg Oral HS   gabapentin 600 mg Oral TID   haloperidol 2 mg Oral Q8H PRN   lamoTRIgine 200 mg Oral Daily   LORazepam 2 mg Oral TID   magnesium sulfate 1 g Intravenous Daily   meclizine 25 mg Oral Q8H PRN   methylPREDNISolone sodium succinate 500 mg Intravenous Q12H PRN   ondansetron 4 mg Intravenous Q6H PRN   zolpidem 2 5 mg Oral HS PRN   zonisamide 200 mg Oral Daily       acetaminophen    diphenhydrAMINE    haloperidol    meclizine    methylPREDNISolone sodium succinate    ondansetron    zolpidem    Vitals: Blood pressure 151/74, pulse 77, temperature 98 2 °F (36 8 °C), temperature source Tympanic, resp  rate 18, height 5' 8" (1 727 m), weight 118 kg (261 lb 3 9 oz), SpO2 95 %  ,Body mass index is 39 72 kg/m²  Physical Exam:     Iveth Bourgeois seen in:  Laying in bed, she is watching TV  She appears comfortable  A light is on, the blinds are not adjusted for darkness  General appearance: alert,   Neck, Lungs, Heart, & abdomen: WNL, additionally she is tender to palpation particularly more on the left scapular ridge and paracervical region  Extremities: atraumatic, no cyanosis or edema    Neurologic:   Mental status: Alert, oriented, thought content appropriate, no aphasia or dysarthria appreciated  Cognitive screening is intact  She has somewhat of a flat affect  CN: exam EOM's I, Gaze conjugate  Non lateralizing sensory & motor exam, (PP not tested on face)  Reminder CNVIII-XII normal   Her face is somewhat masked, she has very limited blinking  There is no hypophonia at this time  Motor: full power age appropriate x 4 limbs  Sensory: grossly intact  X 4 limbs, PP not tested  Cerebellar: no ataxia or past pointing w pronation from a modified Romberg position  Finger taps age appropriate  Gait:  She was not gaited at this time  DTR's: Age appropriate,  Plantars: downgoing       Lab Results:   I have personally reviewed pertinent reports    , CBC:   Results from last 7 days   Lab Units 07/08/19  0636 07/06/19  1154   WBC Thousand/uL 12 23* 11 43*   RBC Million/uL 4 08 3 74*   HEMOGLOBIN g/dL 10 2* 9 3*   HEMATOCRIT % 33 4* 31 4*   MCV fL 82 84   PLATELETS Thousands/uL 339 339   , BMP/CMP:   Results from last 7 days   Lab Units 07/06/19  1154   SODIUM mmol/L 144   POTASSIUM mmol/L 3 6   CHLORIDE mmol/L 107   CO2 mmol/L 28   BUN mg/dL 11   CREATININE mg/dL 0 83   CALCIUM mg/dL 8 5   AST U/L 30   ALT U/L 18   ALK PHOS U/L 108   EGFR ml/min/1 73sq m 77   , Vitamin B12:   , HgBA1C:   , TSH:   Results from last 7 days   Lab Units 07/06/19  1154   TSH 3RD GENERATON uIU/mL 1 298   , Coagulation:   Results from last 7 days   Lab Units 07/06/19  1154   INR  1 15   , Lipid Profile:        Imaging Studies: I have personally reviewed pertinent films in PACS and Note no acute pathology  EEG, Echo, Pathology, and Other Studies: Echocardiogram is pending

## 2019-07-08 NOTE — ASSESSMENT & PLAN NOTE
No further episodes this point her echocardiogram is pending  A question of her syncopal episode may have been triggered by her migraines

## 2019-07-08 NOTE — PHYSICAL THERAPY NOTE
PT EVALUATION    Pt  Name: Kavitha Moffett  Pt  Age: 59 y o  MRN: [id number]  LENGTH OF STAY: 0    Patient Active Problem List   Diagnosis    Bipolar depression ([place] [address] )    Adrenal insufficiency ([place]'s disease) ([place] [address] )    Fibromyalgia    Status post gastrectomy    Spinal stenosis of lumbar region    Osteoarthritis of lumbosacral spine without myelopathy    Migraine    HLD (hyperlipidemia)    Syncope    Orthostatic hypotension    History of TIAs    Intractable migraine    Neck pain    Low back pain    PE (pulmonary thromboembolism) ([place] [address] )    Hypertension    Bipolar disorder    Chronic back pain    DVT, lower extremity (HCC)    Anxiety    Leukocytosis    History of DVT (deep vein thrombosis)    Anemia    Ambulatory dysfunction    Edema    Right arm pain       Admitting Diagnoses:   Syncope [R55]  Anemia [D64 9]  Headache [R51]  Ambulatory dysfunction [R26 2]    Past Medical History:   Diagnosis Date    Adrenal insufficiency (Andrés's disease) ([place] [address] )     Bipolar disorder     Cervical radiculopathy     Chronic back pain     DVT, lower extremity ([place] [address] ) 1991    Fibromyalgia     History of TIAs     cannot remember details    Hypertension     Hypokalemia     Migraine     MRSA (methicillin resistant Staphylococcus aureus) 07/20/2012    nasal swab negative 4/5/19    Psychiatric disorder     Anxiety, major depression, bipolar    Spinal stenosis     Syncope 2014    orthostatic hypotension       Past Surgical History:   Procedure Laterality Date    APPENDECTOMY      GASTRIC RESTRICTION SURGERY      Gastric Sleeve Nov 2015    HYSTERECTOMY      JOINT REPLACEMENT      Left Knee 2011 and Right Hip 2010       Imaging Studies:  CT head without contrast   Final Result by Mary Ashby MD (07/06 6614)      No acute intracranial abnormality                    Workstation performed: [id number]         CT cervical spine without contrast   Final Result by Mary Ashby MD (07/06 2979)      No cervical spine fracture or traumatic malalignment  Workstation performed: [id number]              07/08/19 1259   Note Type   Note type Eval only   Pain Assessment   Pain Score 8   Pain Location Head  (migraine headache)   Hospital Pain Intervention(s) Medication (See MAR); Repositioned; Ambulation/increased activity; Emotional support; Rest   Home Living   Type of [address] One level;Stairs to enter with rails  (3STE)   [address] Walker;Cane   Prior Function   Level of [address] with ADLs and functional mobility  (w/o occasional use of RW or SPC)   Lives With [address] in the last 6 months 1 to 4  (2x 2* to syncopal episode)   Comments (+)    Restrictions/Precautions   Weight Bearing Precautions Per Order No   Other Precautions Multiple lines;Telemetry; Fall Risk;Pain   General   Family/Caregiver Present No   Cognition   Overall Cognitive Status WFL   Arousal/Participation Alert   Orientation Level Oriented X4   Following Commands Follows all commands and directions without difficulty   RUE Assessment   RUE Assessment WFL   RUE Strength   RUE Overall Strength Within Functional Limits - able to perform ADL tasks with strength   LUE Assessment   LUE Assessment WFL   LUE Strength   LUE Overall Strength Within Functional Limits - able to perform ADL tasks with strength   RLE Assessment   RLE Assessment WFL  (except hip, pending hip revision)   Strength RLE   RLE Overall Strength 4/5   R Hip Flexion 3+/5   LLE Assessment   LLE Assessment WFL   Strength LLE   LLE Overall Strength 4/5   Coordination   Movements are Fluid and Coordinated 1   Sensation WFL   Bed Mobility   Supine to Sit 5  Supervision   Additional items Increased time required;Verbal cues   Sit to Supine 5  Supervision   Additional items Increased time required;Verbal cues   Additional Comments assist for lines Transfers   Sit to Stand 5  Supervision   Additional items Increased time required;Verbal cues   Stand to Sit 5  Supervision   Additional items Increased time required;Verbal cues   Additional Comments cues for hand placement; assist for lines   Ambulation/Elevation   Gait pattern Decreased foot clearance;Shuffling; Short stride; Excessively slow   Gait Assistance 5  Supervision   Additional items Verbal cues   Assistive Device Rolling walker   Distance 100'x1   Balance   Static Sitting Normal   Static Standing Fair +  (w/ RW)   Ambulatory Fair  (w/ RW)   Endurance Deficit   Endurance Deficit Yes   Endurance Deficit Description dizziness; headache   Activity Tolerance   Activity Tolerance Patient limited by pain; Patient limited by fatigue   Nurse Made Aware LELE Hernandez   Assessment   Prognosis Good   Problem List Decreased strength;Decreased endurance; Impaired balance;Decreased mobility; Decreased range of motion;Obesity;Pain   Assessment Pt  58 y  o female admitted for Syncope w/ migraine headache  ECHO pending  Pt referred to PT for mobility assessment & D/C planning w/ orders of activity as tolerated  PTA, pt reports being I w/ occasional SPC or RW use   On eval, pt demonstrate dec mobility, balance, endurance & amb  Pt require S for most functional mobility + cues for techniques  Gait deviations as above, slow & shuffling but no gross LOB noted  Pt state that this has been her normal gait 2* to needing a R hip revision  Slight dizziness reported towards end of amb but no LOC  Nsg staff most recent vital signs as follows: /74 (BP Location: Right arm)   Pulse 77   Temp 98 2 °F (36 8 °C) (Tympanic)   Resp 18   Ht 5' 8" (1 727 m)   Wt 118 kg (261 lb 3 9 oz)   SpO2 95%   BMI 39 72 kg/m²   At end of session, pt back in bed w/o issues, call bell & phone in reach  Fall precautions reinforced w/ good understanding   Pt functioning minimally below baseline hence will continue skilled PT to improve function & safety  At this time, will anticipate good progress in PT for safe D/C to home  Pt will benefit from OPPT & family support at D/C  CM to follow  Ns staff to continue to mobilized pt (OOB in chair for all meals & ambulate in room/unit) as tolerated to prevent further decline in function  Nsg notified  Barriers to Discharge None   Goals   Patient Goals to go home soon   STG Expiration Date 07/22/19   Short Term Goal #1 Goals to be met in 14 days; pt will be able to: 1) inc strength & balance by 1/2 grade to improve overall functional mobility & dec fall risk; 2) inc bed mobility to modified I for pt to be able to get in/OOB safely w/ proper techniques 100% of the time, to dec caregiver assistance & safely function at home; 3) inc transfers to modified I for pt to transition safely from one surface to another w/o % of the time, to dec caregiver assistance & safely function at home; 4) inc amb w/ RW approx  >350' w/ modified I for pt to ambulate community distances w/o any % of the time, to dec caregiver assistance & safely function at home; 5) inc barthel score to 75 to decrease overall risk for falls; 6) negotiate stairs w/ S for inc safety during stair mgt inside/outside of home & dec caregiver assistance; 7) pt/caregiver ed   Treatment Day 0   Plan   Treatment/Interventions Functional transfer training;LE strengthening/ROM; Elevations; Therapeutic exercise; Endurance training;Patient/family training;Gait training;Bed mobility;Spoke to nursing   PT Frequency Other (Comment)  (3-5x/wk)   Recommendation   Recommendation Outpatient PT; Home with family support   Equipment Recommended Walker  (RW)   Barthel Index   Feeding 10   Bathing 0   Grooming Score 5   Dressing Score 5   Bladder Score 10   Bowels Score 10   Toilet Use Score 5   Transfers (Bed/Chair) Score 10   Mobility (Level Surface) Score 0   Stairs Score 0   Barthel Index Score 55   Hx/personal factors: co-morbidities, inaccessible home, dec caregiver support, mutliple lines, telemetry, use of AD, pain, h/o of falls, fall risk, assist w/ ADL's and obesity  Examination: dec mobility, dec balance, dec endurance, dec amb, moderate fall risk, pain  Clinical: unpredictable (ongoing medical status, abnormal lab values, moderate fall risk, imaging test/result pending and pain mgt)  Complexity: high     Billy Jarrett, PT

## 2019-07-08 NOTE — UTILIZATION REVIEW
Continued Stay Review    PLEASE NOTE:  Patient upgraded to IP 7/8 @ 1735  from  OBS 7/6 @ 1629 due to  Continued Migraine    07/08/19 1736  Inpatient Admission Once     Transfer Service: General Medicine    Expected Discharge Date: 07/09/19       Question Answer Comment   Admitting Physician CHEIKH Valles    Level of Care Med Surg    Estimated length of stay More than 2 Midnights    Certification I certify that inpatient services are medically necessary for this patient for a duration of greater than two midnights  See H&P and MD Progress Notes for additional information about the patient's course of treatment  07/08/19 1735       Date: 7/8/2019                        Current Patient Class: IP  Current Level of Care: Med Surg    HPI:58 y o  female initially admitted on  7/6 with Syncope    Assessment/Plan:    Developed migraine yesterday - Rec'd IV steroids, DHE and Depacon  C/O Migraine and Nausea today  Per Neuro: Migraine Improved some overnite  but started again with migraine today -  reports 8/10  Given Mag Sulfate IV  See meds below  hesitant to use the Depakote as she is already on Lamictal at this time      Pertinent Labs/Diagnostic Results:   Results from last 7 days   Lab Units 07/08/19  0636 07/06/19  1154   WBC Thousand/uL 12 23* 11 43*   HEMOGLOBIN g/dL 10 2* 9 3*   HEMATOCRIT % 33 4* 31 4*   PLATELETS Thousands/uL 339 339   NEUTROS ABS Thousands/µL 11 11*  --    BANDS PCT %  --  2     Results from last 7 days   Lab Units 07/06/19  1154   SODIUM mmol/L 144   POTASSIUM mmol/L 3 6   CHLORIDE mmol/L 107   CO2 mmol/L 28   ANION GAP mmol/L 9   BUN mg/dL 11   CREATININE mg/dL 0 83   EGFR ml/min/1 73sq m 77   CALCIUM mg/dL 8 5     Results from last 7 days   Lab Units 07/06/19  1154   AST U/L 30   ALT U/L 18   ALK PHOS U/L 108   TOTAL PROTEIN g/dL 6 5   ALBUMIN g/dL 2 8*   TOTAL BILIRUBIN mg/dL 0 17*     Results from last 7 days   Lab Units 07/06/19  1154   GLUCOSE RANDOM mg/dL 90 Results from last 7 days   Lab Units 07/06/19  1154   TROPONIN I ng/mL <0 02     Results from last 7 days   Lab Units 07/06/19  1154   PROTIME seconds 14 9*   INR  1 15     ECHO  Pending    Vital Signs:   07/08/19 1440  97 7 °F (36 5 °C)  69  18  159/73  94 %  None (Room air)     07/08/19 0748  98 2 °F (36 8 °C)  77  18  151/74  95 %  None (Room air)  Lying   07/07/19 2300  98 3 °F (36 8 °C)  59  18  149/67  95 %  None (Room air)  Lying   07/07/19 1900  99 1 °F (37 3 °C)  70  18  146/63  92 %  None (Room air)  Lying   07/07/19 1543  99 °F (37 2 °C)  70  22  168/72  95 %  Nasal cannula  Sitting       Medications:   Scheduled Meds:   Current Facility-Administered Medications:  acetaminophen 650 mg Oral Q6H PRN X 2 7/7  & x 1 7/8    apixaban 5 mg Oral BID     ARIPiprazole 5 mg Oral Daily     fluvoxaMINE 300 mg Oral HS     gabapentin 600 mg Oral TID     haloperidol 2 mg Oral Q8H PRN X 1 7/7    lamoTRIgine 200 mg Oral Daily     LORazepam 2 mg Oral TID     meclizine 25 mg Oral Q8H PRN X 1 7/7    methylPREDNISolone sodium succinate 500 mg Intravenous Q12H PRN IV x 1 7/7 & x 1 7/8    ondansetron 4 mg Intravenous Q6H PRN  IV x 1 7/8    zolpidem 2 5 mg Oral HS PRN     zonisamide 200 mg Oral Daily       DHE 1 mg IV x 1 7/7  Depacon 1,000 mg IV x 1 7/7  Percocet po x 1 7/7    Mag Sulfate 1G IV x 1 7/8  Benadryl 25 mg IV q6h prn - x 1 7/8    Discharge Plan: TBD    Network Utilization Review Department  Phone: [phone]; Fax [phone]  [email or web address]  ATTENTION: Please call with any questions or concerns to [phone]  and carefully listen to the prompts so that you are directed to the right person  Send all requests for admission clinical reviews, approved or denied determinations and any other requests to fax [phone]   All voicemails are confidential

## 2019-07-08 NOTE — SOCIAL WORK
Pt admitted as observation with notification reviewed with CM - form signed and to be scanned into EHR  Pt admitted with syncope awaiting echo/results  Therapy has seen pt and recommend OP therapy- attending aware of need for script for same w/ list of OP facilities provided to pt  Pt lives with  in a [facility] being independent with all aspects of care, ambulating without an AD unless when feeling weak on occasion admits to using a SPC or RW which she has at home from previous surgeries  Pt drives self to appointments  Denies legal issues, D&A, states her  is her health care representative  Pt has a h/o Bipolar as listed on chart however denies same stating she has a mood disorder w/ anxiety  She sees a psychiatrist and therapist every 3 months at [address] [place] [address]   Pt's PCP is Dr Burnell Lefort w/ [facility]- currently it is noted pt enlisted in integrated Care Coordination (pt outreach/population health) w/ [facility]- VM left re: pt's admission so to have close follow up (pt with 3 hospital stays in past month)  She uses [facility] [address] for prescriptions  Pt's  to transport home on d/c      CM reviewed discharge planning process including the following: identifying caregivers at home, preference for d/c planning needs, availability of [facility] program, availability of treatment team to discuss questions or concerns patient and/or family may have regarding diagnosis, plan of care, old or new medications and discharge planning   No barriers to d/c identified at this time  Speaking Coherently

## 2019-07-08 NOTE — PLAN OF CARE
Problem: Potential for Falls  Goal: Patient will remain free of falls  Description  INTERVENTIONS:  - Assess patient frequently for physical needs  -  Identify cognitive and physical deficits and behaviors that affect risk of falls    -  [place] fall precautions as indicated by assessment   - Educate patient/family on patient safety including physical limitations  - Instruct patient to call for assistance with activity based on assessment  - Modify environment to reduce risk of injury  - Consider OT/PT consult to assist with strengthening/mobility  Outcome: Progressing     Problem: PAIN - ADULT  Goal: Verbalizes/displays adequate comfort level or baseline comfort level  Description  Interventions:  - Encourage patient to monitor pain and request assistance  - Assess pain using appropriate pain scale  - Administer analgesics based on type and severity of pain and evaluate response  - Implement non-pharmacological measures as appropriate and evaluate response  - Consider cultural and social influences on pain and pain management  - Notify physician/advanced practitioner if interventions unsuccessful or patient reports new pain  Outcome: Progressing     Problem: SAFETY ADULT  Goal: Maintain or return to baseline ADL function  Description  INTERVENTIONS:  -  Assess patient's ability to carry out ADLs; assess patient's baseline for ADL function and identify physical deficits which impact ability to perform ADLs (bathing, care of mouth/teeth, toileting, grooming, dressing, etc )  - Assess/evaluate cause of self-care deficits   - Assess range of motion  - Assess patient's mobility; develop plan if impaired  - Assess patient's need for assistive devices and provide as appropriate  - Encourage maximum independence but intervene and supervise when necessary  ¯ Involve family in performance of ADLs  ¯ Assess for home care needs following discharge   ¯ Request OT consult to assist with ADL evaluation and planning for discharge  ¯ Provide patient education as appropriate  Outcome: Progressing  Goal: Maintain or return mobility status to optimal level  Description  INTERVENTIONS:  - Assess patient's baseline mobility status (ambulation, transfers, stairs, etc )    - Identify cognitive and physical deficits and behaviors that affect mobility  - Identify mobility aids required to assist with transfers and/or ambulation (gait belt, sit-to-stand, lift, walker, cane, etc )  - [place] fall precautions as indicated by assessment  - Record patient progress and toleration of activity level on Mobility SBAR; progress patient to next Phase/Stage  - Instruct patient to call for assistance with activity based on assessment  - Request Rehabilitation consult to assist with strengthening/weightbearing, etc   Outcome: Progressing     Problem: NEUROSENSORY - ADULT  Goal: Achieves stable or improved neurological status  Description  INTERVENTIONS  - Monitor and report changes in neurological status  - Initiate measures to prevent increased intracranial pressure  - Maintain blood pressure and fluid volume within ordered parameters to optimize cerebral perfusion  - Monitor temperature, glucose, and sodium or any other associated labs   Initiate appropriate interventions as ordered  - Monitor for seizure activity   - Administer anti-seizure medications as ordered  Outcome: Progressing  Goal: Achieves maximal functionality and self care  Description  INTERVENTIONS  - Monitor swallowing and airway patency with patient fatigue and changes in neurological status  - Encourage and assist patient to increase activity and self care with guidance from rehab services  - Encourage visually impaired, hearing impaired and aphasic patients to use assistive/communication devices  Outcome: Progressing     Problem: CARDIOVASCULAR - ADULT  Goal: Maintains optimal cardiac output and hemodynamic stability  Description  INTERVENTIONS:  - Monitor I/O, vital signs and rhythm  - Monitor for S/S and trends of decreased cardiac output i e  bleeding, hypotension  - Administer and titrate ordered vasoactive medications to optimize hemodynamic stability  - Assess quality of pulses, skin color and temperature  - Assess for signs of decreased coronary artery perfusion - ex   Angina  - Instruct patient to report change in severity of symptoms  Outcome: Progressing  Goal: Absence of cardiac dysrhythmias or at baseline rhythm  Description  INTERVENTIONS:  - Continuous cardiac monitoring, monitor vital signs, obtain 12 lead EKG if indicated  - Administer antiarrhythmic and heart rate control medications as ordered  - Monitor electrolytes and administer replacement therapy as ordered  Outcome: Progressing     Problem: METABOLIC, FLUID AND ELECTROLYTES - ADULT  Goal: Electrolytes maintained within normal limits  Description  INTERVENTIONS:  - Monitor labs and assess patient for signs and symptoms of electrolyte imbalances  - Administer electrolyte replacement as ordered  - Monitor response to electrolyte replacements, including repeat lab results as appropriate  - Instruct patient on fluid and nutrition as appropriate  Outcome: Progressing  Goal: Fluid balance maintained  Description  INTERVENTIONS:  - Monitor labs and assess for signs and symptoms of volume excess or deficit  - Monitor I/O and WT  - Instruct patient on fluid and nutrition as appropriate  Outcome: Progressing     Problem: SKIN/TISSUE INTEGRITY - ADULT  Goal: Skin integrity remains intact  Description  INTERVENTIONS  - Identify patients at risk for skin breakdown  - Assess and monitor skin integrity  - Assess and monitor nutrition and hydration status  - Monitor labs (i e  albumin)  - Assess for incontinence   - Turn and reposition patient  - Assist with mobility/ambulation  - Relieve pressure over bony prominences  - Avoid friction and shearing  - Provide appropriate hygiene as needed including keeping skin clean and dry  - Evaluate need for skin moisturizer/barrier cream  - Collaborate with interdisciplinary team (i e  Nutrition, Rehabilitation, etc )   - Patient/family teaching  Outcome: Progressing

## 2019-07-08 NOTE — PROGRESS NOTES
[facility] Progress Note  Patient: Yecenia Olmos 59 y o  female   MRN: [id number]  PCP: Vance Carty DO  Unit/Bed#: E4 -01 Encounter: [id number]  Date Of Visit: 07/08/19    Assessment:    Principal Problem:    Syncope  Active Problems:    Bipolar depression ([facility] )    Intractable migraine    DVT, lower extremity ([facility] )      Plan:    10-year-old female with a history of recurrent syncope today while at her place of residence states she felt dizzy and and leg buckling fell against a entertainment center(she described to the ED physician falling backwards and told me she fell forwards) describing apparent loss of consciousness although her usual syncopal attacks are syncope and collapse and no preceding symptoms of dizziness or lightheadedness  She presented to the ED with concerns of striking her head and being on Eliquis where workup Ng entailed a CT of the cervical spine and brain that was unremarkable for any signs of bleeding she then preceded to have complaints of increasing dizziness unable to obtain upright status and increasing headache described as frontal to her back atypical for history of migraines will that usually have an aura and states herself that this is quite different than her usual migraines  A migraine cocktail given to her had no result  She also complains of some nausea and on obtaining sitting up in litter became further dizzy   Hospital Problem List:     Principal Problem:    Syncope  Active Problems:    Bipolar depression ([facility] )    Intractable migraine    DVT, lower extremity ([facility] )      Plan for the Primary Problem(s):  · Syncope with a history of recurrent syncope of  unclear etiology, she is on numerous psychoactive medications was not no signs of QT prolongation there is some contradictory aspects to her history however    Will  admitted to observation status  · Headache that seems to be more post concussive than her typical migraine was seen by Neurology and treat his migraine is some relief last night but again related headache this morning to Neurology but not to me and will need to be assess further  I see no signs of head trauma in her scalp and CT imaging was unremarkable of cervical spine and brain/think there is also emotional component involved and may not be organic  · History of DVT right upper extremity after usage of a PICC line will continue on Eliquis as no signs of hemorrhagic changes on CT imaging  · Bipolar depression continue on present meds including Haldol, Luvox of which MAR list she is taking 300 mg q h s  Which is double the maximum dosage also on Zonegran at high dosage which is indicated for focal onset seizures also on Lamictal 200 and Abilify    Plan for Additional Problems:   · Cardiac murmur heard on auscultation she denies having prior history of heart murmur and states she will over a year since she has had an echocardiogram which is hard to believe given the recurrent syncope  she has had but will order 2D echocardiogram last stress test in January 2018 apparently normal doubt cardiac syncope in this setting but will place on telemetry to watch QT intervals  · Ambulatory dysfunction possibly in relation to post concussive state/deconditioning/vertigo? will obtain PT/OT prior to discharge to see if she has home needs  · Diagnostic differential listed in chart notes adrenal insufficiency but she is not on steroids and no signs of sodium or potassium abnormalities      VTE Pharmacologic Prophylaxis:   Pharmacologic: Apixaban (Eliquis)  Mechanical VTE Prophylaxis in Place: Yes    Discussions with Specialists or Other Care Team Provider:     Time Spent for Care: 30 minutes  More than 50% of total time spent on counseling and coordination of care as described above        Subjective:   Still admits to having headache but details of which change which every visit I make with her in changes with suggestion again admitting to nausea only after I asked her and eating breakfast received Percocet for headache last night I informed her that will not repeat this as would rather not treat post concussive headache with narcotics  Encouraged her to get out of bed today and ambulate/and this morning on her out of bed in bathroom on chair washing herself up and denied to me active headache or dizziness  Await PT evaluation and 2D echocardiogram    Objective:     Vitals:   Temp (24hrs), Av 7 °F (37 1 °C), Min:98 2 °F (36 8 °C), Max:99 1 °F (37 3 °C)    Temp:  [98 2 °F (36 8 °C)-99 1 °F (37 3 °C)] 98 2 °F (36 8 °C)  HR:  [59-77] 77  Resp:  [18-22] 18  BP: (146-168)/(63-85) 151/74  SpO2:  [92 %-95 %] 95 %  Body mass index is 39 72 kg/m²  Input and Output Summary (last 24 hours): Intake/Output Summary (Last 24 hours) at 2019 1139  Last data filed at 2019 0846  Gross per 24 hour   Intake 900 ml   Output 300 ml   Net 600 ml       Physical Exam:     Physical Exam:   General appearance: alert, appears stated age and cooperative  Head: Normocephalic, without obvious abnormality, atraumatic  Lungs: clear to auscultation bilaterally  Heart: regular rate and rhythm  Abdomen: soft, non-tender; bowel sounds normal; no masses,  no organomegaly  Back: negative  Extremities: extremities normal, atraumatic, no cyanosis or edema  Neurologic: Grossly normal      Additional Data:     Labs:    Results from last 7 days   Lab Units 19  0636   WBC Thousand/uL 12 23*   HEMOGLOBIN g/dL 10 2*   HEMATOCRIT % 33 4*   PLATELETS Thousands/uL 339   NEUTROS PCT % 91*   LYMPHS PCT % 7*   MONOS PCT % 0*   EOS PCT % 0     Results from last 7 days   Lab Units 19  1154   POTASSIUM mmol/L 3 6   CHLORIDE mmol/L 107   CO2 mmol/L 28   BUN mg/dL 11   CREATININE mg/dL 0 83   CALCIUM mg/dL 8 5   ALK PHOS U/L 108   ALT U/L 18   AST U/L 30     Results from last 7 days   Lab Units 19  1154   INR  1 15       * I Have Reviewed All Lab Data Listed Above    * Additional Pertinent Lab Tests Reviewed: [facility] Admission Reviewed    Imaging:  Ct Head Without Contrast    Result Date: 7/6/2019  Narrative: CT BRAIN - WITHOUT CONTRAST INDICATION:   syncope, fall head injury Eliquis  COMPARISON:  None  TECHNIQUE:  CT examination of the brain was performed  In addition to axial images, coronal 2D reformatted images were created and submitted for interpretation  Radiation dose length product (DLP) for this visit:  987 mGy-cm   This examination, like all CT scans performed in the [facility], was performed utilizing techniques to minimize radiation dose exposure, including the use of iterative reconstruction and automated exposure control  IMAGE QUALITY:  Diagnostic  FINDINGS: PARENCHYMA:  No intracranial mass, mass effect or midline shift  No CT signs of acute infarction  No acute parenchymal hemorrhage  VENTRICLES AND EXTRA-AXIAL SPACES:  Normal for the patient's age  VISUALIZED ORBITS AND PARANASAL SINUSES:  Unremarkable  CALVARIUM AND EXTRACRANIAL SOFT TISSUES:  Normal      Impression: No acute intracranial abnormality  Workstation performed: [id number]     Ct Cervical Spine Without Contrast    Result Date: 7/6/2019  Narrative: CT CERVICAL SPINE - WITHOUT CONTRAST INDICATION:   fall, injury  COMPARISON:  Cervical spine CT 1/4/2018  TECHNIQUE:  CT examination of the cervical spine was performed without intravenous contrast   Contiguous axial images were obtained  Sagittal and coronal reconstructions were performed  Radiation dose length product (DLP) for this visit:  602 mGy-cm   This examination, like all CT scans performed in the [facility], was performed utilizing techniques to minimize radiation dose exposure, including the use of iterative reconstruction and automated exposure control  IMAGE QUALITY:  Diagnostic  FINDINGS: ALIGNMENT:  There is straightening of normal cervical lordosis    No subluxation or compression deformity  VERTEBRAL BODIES:  No fracture  DEGENERATIVE CHANGES:  Mild multilevel cervical degenerative changes are noted without critical central canal stenosis  PREVERTEBRAL AND PARASPINAL SOFT TISSUES:  Unremarkable  THORACIC INLET:  Normal      Impression: No cervical spine fracture or traumatic malalignment  Workstation performed: [id number]     Imaging Reports Reviewed Today Include:  Reviewed CT head and C-spine  Imaging Personally Reviewed by Myself Includes:    Procedure: Ct Head Without Contrast    Result Date: 7/6/2019  Narrative: CT BRAIN - WITHOUT CONTRAST INDICATION:   syncope, fall head injury Eliquis  COMPARISON:  None  TECHNIQUE:  CT examination of the brain was performed  In addition to axial images, coronal 2D reformatted images were created and submitted for interpretation  Radiation dose length product (DLP) for this visit:  987 mGy-cm   This examination, like all CT scans performed in the [facility], was performed utilizing techniques to minimize radiation dose exposure, including the use of iterative reconstruction and automated exposure control  IMAGE QUALITY:  Diagnostic  FINDINGS: PARENCHYMA:  No intracranial mass, mass effect or midline shift  No CT signs of acute infarction  No acute parenchymal hemorrhage  VENTRICLES AND EXTRA-AXIAL SPACES:  Normal for the patient's age  VISUALIZED ORBITS AND PARANASAL SINUSES:  Unremarkable  CALVARIUM AND EXTRACRANIAL SOFT TISSUES:  Normal      Impression: No acute intracranial abnormality  Workstation performed: [id number]     Procedure: Ct Cervical Spine Without Contrast    Result Date: 7/6/2019  Narrative: CT CERVICAL SPINE - WITHOUT CONTRAST INDICATION:   fall, injury  COMPARISON:  Cervical spine CT 1/4/2018  TECHNIQUE:  CT examination of the cervical spine was performed without intravenous contrast   Contiguous axial images were obtained  Sagittal and coronal reconstructions were performed    Radiation dose length product (DLP) for this visit:  602 mGy-cm   This examination, like all CT scans performed in the [facility], was performed utilizing techniques to minimize radiation dose exposure, including the use of iterative reconstruction and automated exposure control  IMAGE QUALITY:  Diagnostic  FINDINGS: ALIGNMENT:  There is straightening of normal cervical lordosis  No subluxation or compression deformity  VERTEBRAL BODIES:  No fracture  DEGENERATIVE CHANGES:  Mild multilevel cervical degenerative changes are noted without critical central canal stenosis  PREVERTEBRAL AND PARASPINAL SOFT TISSUES:  Unremarkable  THORACIC INLET:  Normal      Impression: No cervical spine fracture or traumatic malalignment  Workstation performed: [id number]        Recent Cultures (last 7 days):           Last 24 Hours Medication List:     Current Facility-Administered Medications:  acetaminophen 650 mg Oral Q6H PRN Konrad Florez PA-C    apixaban 5 mg Oral BID Charles Egan MD    ARIPiprazole 5 mg Oral Daily Charles Egan MD    diphenhydrAMINE 25 mg Intravenous Q6H PRN MOON Manjarrez    fluvoxaMINE 300 mg Oral HS Charles Egan MD    gabapentin 600 mg Oral TID Charles Egan MD    haloperidol 2 mg Oral Q8H PRN Charles Egan MD    lamoTRIgine 200 mg Oral Daily Charles Egan MD    LORazepam 2 mg Oral TID Charles Egan MD    magnesium sulfate 1 g Intravenous Daily MOON Manjarrez    meclizine 25 mg Oral Q8H PRN Charles Egan MD    methylPREDNISolone sodium succinate 500 mg Intravenous Q12H PRN Oneil Cartwright DO Last Rate: 500 mg (07/08/19 1029)   ondansetron 4 mg Intravenous Q6H PRN Charles Egan MD    zolpidem 2 5 mg Oral HS PRN Konrad Florez PA-C    zonisamide 200 mg Oral Daily Charles Egan MD         Today, Patient Was Seen By: Charles Egan MD    ** Please Note: Dragon 360 Dictation voice to text software may have been used in the creation of this document  **

## 2019-07-08 NOTE — ASSESSMENT & PLAN NOTE
The patient reports are her headache/migraine today started again after did seem to improve some overnight  She reports she is currently an 8/10  She had a dose of DHE yesterday rosie m  Hesitant to use it again today so soon after she had a prior dose  She reports her migraine is a it over 10 right now she has no tachycardia facial signs of headache pain  I have asked her to turn off her TV and use local measures as well such as ice which she reports she has used before with relief  Additionally I have also given her an infusion of magnesium  I am hesitant to use the Depakote as she is already on Lamictal at this time

## 2019-07-09 ENCOUNTER — APPOINTMENT (INPATIENT)
Dept: NON INVASIVE DIAGNOSTICS | Facility: HOSPITAL | Age: 59
DRG: 103 | End: 2019-07-09
Payer: COMMERCIAL

## 2019-07-09 VITALS
BODY MASS INDEX: 39.59 KG/M2 | SYSTOLIC BLOOD PRESSURE: 171 MMHG | HEIGHT: 68 IN | HEART RATE: 67 BPM | DIASTOLIC BLOOD PRESSURE: 73 MMHG | RESPIRATION RATE: 18 BRPM | OXYGEN SATURATION: 97 % | WEIGHT: 261.25 LBS | TEMPERATURE: 98.5 F

## 2019-07-09 PROCEDURE — 93306 TTE W/DOPPLER COMPLETE: CPT | Performed by: INTERNAL MEDICINE

## 2019-07-09 PROCEDURE — 93306 TTE W/DOPPLER COMPLETE: CPT

## 2019-07-09 PROCEDURE — 99239 HOSP IP/OBS DSCHRG MGMT >30: CPT | Performed by: INTERNAL MEDICINE

## 2019-07-09 RX ADMIN — ONDANSETRON 4 MG: 2 INJECTION INTRAMUSCULAR; INTRAVENOUS at 06:36

## 2019-07-09 RX ADMIN — DIPHENHYDRAMINE HYDROCHLORIDE 25 MG: 50 INJECTION, SOLUTION INTRAMUSCULAR; INTRAVENOUS at 00:28

## 2019-07-09 RX ADMIN — LAMOTRIGINE 200 MG: 100 TABLET ORAL at 08:32

## 2019-07-09 RX ADMIN — ZONISAMIDE 200 MG: 100 CAPSULE ORAL at 08:32

## 2019-07-09 RX ADMIN — ARIPIPRAZOLE 5 MG: 5 TABLET ORAL at 08:32

## 2019-07-09 RX ADMIN — DIPHENHYDRAMINE HYDROCHLORIDE 25 MG: 50 INJECTION, SOLUTION INTRAMUSCULAR; INTRAVENOUS at 12:30

## 2019-07-09 RX ADMIN — APIXABAN 5 MG: 5 TABLET, FILM COATED ORAL at 08:31

## 2019-07-09 RX ADMIN — LORAZEPAM 2 MG: 1 TABLET ORAL at 08:31

## 2019-07-09 RX ADMIN — DIPHENHYDRAMINE HYDROCHLORIDE 25 MG: 50 INJECTION, SOLUTION INTRAMUSCULAR; INTRAVENOUS at 06:36

## 2019-07-09 RX ADMIN — GABAPENTIN 600 MG: 300 CAPSULE ORAL at 08:31

## 2019-07-09 RX ADMIN — ONDANSETRON 4 MG: 2 INJECTION INTRAMUSCULAR; INTRAVENOUS at 12:30

## 2019-07-09 RX ADMIN — ONDANSETRON 4 MG: 2 INJECTION INTRAMUSCULAR; INTRAVENOUS at 00:29

## 2019-07-09 RX ADMIN — ACETAMINOPHEN 650 MG: 325 TABLET ORAL at 09:01

## 2019-07-09 RX ADMIN — SODIUM CHLORIDE 500 MG: 0.9 INJECTION, SOLUTION INTRAVENOUS at 04:59

## 2019-07-09 NOTE — UTILIZATION REVIEW
PATIENT NOW IS I/P FROM PRIOR FAX - WHICH PATIENT WAS ON OBSERVATION     Notification of Inpatient Admission/Inpatient Authorization Request  This is a Notification of Inpatient Admission/Request for Inpatient Authorization for our facility [address]  Be advised that this patient was admitted to our facility under Inpatient Status  Please contact the Utilization Review Department where the patient is receiving care services for additional admission information  Place of Service Code: 24   Place of Service Name: Inpatient Hospital  Presentation Date & Time: 7/6/2019 11:39 AM  Inpatient Admission Date & Time: 7/8/19 1735  Discharge Date & Time: No discharge date for patient encounter  Discharge Disposition (if discharged): Home/Self Care  Attending Physician: Raza Joseph, [address] [[id number]]  Admission Orders (From admission, onward)    Ordered        07/08/19 1735  Inpatient Admission  Once         07/06/19 1629  Place in Observation  Once             Facility: [facility] Main Utilization Review Department  Phone: [phone]; Fax [phone]  [email or web address]  ATTENTION: Please call with any questions or concerns to [phone]  and carefully listen to the prompts so that you are directed to the right person  Send all requests for admission clinical reviews, approved or denied determinations and any other requests to fax [phone]   All voicemails are confidential      Michelet Griffin RN   Registered Nurse   Utilization Review   Utilization Review   Addendum   Date of Service:  7/7/2019 10:57 AM               Expand All Collapse All      Show:Clear all  [x]Manual[x]Template[x]Copied    Added by:  [x]Concha Jacome RN    []Elsa for details  Initial Clinical Review     Admission: Date/Time/Statement: OBSERVATION 7/6/19 @1629           Orders Placed This Encounter   Procedures    Place in Observation       Standing Status: Standing       Number of Occurrences:   1       Order Specific Question:   Admitting Physician       Answer:   Clau Peterson       Order Specific Question:   Level of Care       Answer:   Med Surg [16]                ED Arrival Information      Expected Arrival Acuity Means of Arrival Escorted By Service Admission Type     - 7/6/2019 11:36 Emergent Walk-In Self General Medicine Emergency     Arrival Complaint     -               Chief Complaint   Patient presents with    Syncope       Pt states she had a syncopal episode at home approx 45 min ago  Pt states this frequnetly hapens to her and they cannot figure out why  Pt is on eliquis and states she hit her head on the entertainement stamd  No laceration noted  Pt denies loc       Assessment/Plan: 60 yo fem to ED from home admitted under observation due to syncope  78-year-old female with a history of recurrent syncope today while at her place of residence states she felt dizzy and and leg buckling fell against a entertainment center(she described to the ED physician falling backwards and told me she fell from words) describing apparent loss of consciousness although her usual syncopal attacks are syncope and collapse and no preceding symptoms of dizziness or lightheadedness   She presented to the ED with concerns of striking her head and being on Eliquis where workup Ng entailed a CT of the cervical spine and brain that was unremarkable for any signs of bleeding she then preceded to have complaints of increasing dizziness unable to obtain upright status and increasing headache described as frontal to her back atypical for history of migraines will that usually have an aura and states herself that this is quite different than her usual migraines   A migraine cocktail given to her had no result   She also complains of some nausea and on obtaining sitting up in litter became further dizzy     Plan for the Primary Problem(s):  · Syncope with a history of recurrent syncope of  unclear etiology, she is on numerous psychoactive medications was not no signs of QT prolongation there is some contradictory aspects to her history however   Will at admitted to observation status  ? Headache that seems to be more post concussive than her typical migraine and will need to be assess further next 24 hours and have Neurology evaluate I see no signs of head trauma in her scalp and CT imaging was unremarkable of cervical spine and brain  ? History of DVT right upper extremity after usage of a PICC line will continue on Eliquis as no signs of hemorrhagic changes on CT imaging  ? Bipolar depression continue on present meds including Haldol, Luvox of which MAR list she is taking 300 mg q h s   Which is double the maximum dosage also on Zonegran at high dosage which is indicated for focal onset seizures also on Lamictal 200 and Abilify     Plan for Additional Problems:   · Cardiac murmur heard on auscultation she denies having prior history of heart murmur and states she will over a year since she has had an echocardiogram which is hard to believe given the recurrent syncope  she has had but will order 2D echocardiogram last stress test in January 2018 apparently normal doubt cardiac syncope in this setting but will place on telemetry to watch QT intervals  · Ambulatory dysfunction possibly in relation to post concussive state will obtain PT/OT  · Diagnostic differential listed in chart notes adrenal insufficiency but she is not on steroids and no signs of sodium or potassium abnormalities     7/7 per med: Still admits to having headache but details of which change which every visit I make with her in changes with suggestion again admitting to nausea only after I asked her and eating breakfast received Percocet for headache last night I informed her that will not repeat this as would rather not treat post concussive headache with narcotics   Encouraged her to get out of bed today and ambulate     7/7 per neuro: 78-year-old female with likely syncope  Pamela Velazquez with echocardiogram   -orthostatics  -supportive care with IV fluids  - PT OT eval and treat  - patient reports compliance with home dose of Lamictal 200 mg daily, despite diminished level of 1 8   - continue home medications  - no additional neurologic recommendations at this time  - assuming workup is negative, and patient is able to ambulate with physical therapy without difficulty and/or recurrent symptoms, then would be cleared for discharge from a neurologic standpoint   - no clear need for new outpatient neurologic follow-up      Called by nurse as patient is reporting onset of migraine   She reports she normally takes her DHE injectable Tomas, 1 mg/mL, 1 mg at home  Will order DHE 1 mg              ED Triage Vitals   Temperature Pulse Respirations Blood Pressure SpO2   07/06/19 1143 07/06/19 1143 07/06/19 1143 07/06/19 1143 07/06/19 1143   98 °F (36 7 °C) 74 16 (!) 177/85 93 %       Temp Source Heart Rate Source Patient Position - Orthostatic VS BP Location FiO2 (%)   07/06/19 1143 07/06/19 1143 07/06/19 1237 07/06/19 1143 --   Temporal Monitor Lying Right arm         Pain Score           07/06/19 1237           Worst Possible Pain                Wt Readings from Last 1 Encounters:   07/06/19 118 kg (261 lb 3 9 oz)      Additional Vital Signs:   Date/Time   Temp   Pulse   Resp   BP   SpO2   O2 Device   Patient Position - Orthostatic VS   07/07/19 0816   97 5 °F (36 4 °C)   88   21   106/66   98 %   None (Room air)   Sitting   07/06/19 2316   97 6 °F (36 4 °C)   72   18   139/80   95 %   None (Room air)   Lying   07/06/19 1928   98 7 °F (37 1 °C)   78   19   173/75Abnormal    93 %   None (Room air)   Lying   07/06/19 1735   97 6 °F (36 4 °C)   64   18   170/74   97 %   None (Room air)   Sitting   07/06/19 1631      65   18   143/67   97 %   None (Room air)   Lying   07/06/19 1449      80   18   139/65   99 %   None (Room air)   Lying   07/06/19 1447      74   18   145/63   97 %   None (Room air)   Sitting   07/06/19 1445      66   18   140/63   98 %   None (Room air)   Lying   07/06/19 1348      69   16   166/79   98 %   None (Room air)             Pertinent Labs/Diagnostic Test Results:   7/6 CT head: nothing acute  7/6 CT c spine: no fx or malalignment  7/6 EKG: junctional vs  Sinus rhythm       Results from last 7 days   Lab Units 07/06/19  1154   WBC Thousand/uL 11 43*   HEMOGLOBIN g/dL 9 3*   HEMATOCRIT % 31 4*   PLATELETS Thousands/uL 339   BANDS PCT % 2           Results from last 7 days   Lab Units 07/06/19  1154   SODIUM mmol/L 144   POTASSIUM mmol/L 3 6   CHLORIDE mmol/L 107   CO2 mmol/L 28   ANION GAP mmol/L 9   BUN mg/dL 11   CREATININE mg/dL 0 83   EGFR ml/min/1 73sq m 77   CALCIUM mg/dL 8 5           Results from last 7 days   Lab Units 07/06/19  1154   AST U/L 30   ALT U/L 18   ALK PHOS U/L 108   TOTAL PROTEIN g/dL 6 5   ALBUMIN g/dL 2 8*   TOTAL BILIRUBIN mg/dL 0 17*           Results from last 7 days   Lab Units 07/06/19  1154   GLUCOSE RANDOM mg/dL 90           Results from last 7 days   Lab Units 07/06/19  1154   TROPONIN I ng/mL <0 02           Results from last 7 days   Lab Units 07/06/19  1154   PROTIME seconds 14 9*   INR   1 15      ED Treatment:             Medication Administration from 07/06/2019 1136 to 07/06/2019 1659        Date/Time Order Dose Route Action       07/06/2019 1453 acetaminophen (TYLENOL) tablet 650 mg 650 mg Oral Given       07/06/2019 1531 sodium chloride 0 9 % bolus 1,000 mL 1,000 mL Intravenous New Bag       07/06/2019 1632 metoclopramide (REGLAN) injection 10 mg 10 mg Intravenous Given       07/06/2019 1632 diphenhydrAMINE (BENADRYL) injection 25 mg 25 mg Intravenous Given       07/06/2019 1632 dexamethasone (DECADRON) injection 10 mg 10 mg Intravenous Given          Medical History        Past Medical History:   Diagnosis Date    Adrenal insufficiency ([place]'s disease) ([facility] [address] )      Bipolar disorder      Cervical radiculopathy      Chronic back pain      DVT, lower extremity (HCC) 1991    Fibromyalgia      History of TIAs       cannot remember details    Hypertension      Hypokalemia      Migraine      MRSA (methicillin resistant Staphylococcus aureus) 07/20/2012     nasal swab negative 4/5/19    Psychiatric disorder       Anxiety, major depression, bipolar    Spinal stenosis      Syncope 2014     orthostatic hypotension         Present on Admission:   Syncope   Bipolar depression ([facility] [address] )   Intractable migraine   DVT, lower extremity ([facility] [address] )        Admitting Diagnosis: Syncope [R55]  Anemia [D64 9]  Headache [R51]  Ambulatory dysfunction [R26 2]  Age/Sex: 59 y o  female  Admission Orders:     Current Facility-Administered Medications:  acetaminophen 650 mg Oral Q6H PRN x 2   apixaban 5 mg Oral BID   ARIPiprazole 5 mg Oral Daily   fluvoxaMINE 300 mg Oral HS   gabapentin 600 mg Oral TID   haloperidol 2 mg Oral Q8H PRN x 1 on 7/6   lamoTRIgine 200 mg Oral Daily   LORazepam 2 mg Oral TID   ondansetron 4 mg Intravenous Q6H PRN x 1 on 7/7   zolpidem 2 5 mg Oral HS PRN x 1 on 7/7   zonisamide 200 mg Oral Daily   sodium chloride 0 9 % infusion   Rate: 75 mL/hr Dose: 75 mL/hr  Freq: Continuous Route: IV  Last Dose: Stopped (07/07/19 0834)  Start: 07/06/19 1700 End: 07/07/19 0807  Neuro checks q4h  SCD's  tele     IP CONSULT TO NEUROLOGY     Network Utilization Review Department  Phone: [phone]; Fax [phone]  [email or web address]  ATTENTION: Please call with any questions or concerns to [phone]  and carefully listen to the prompts so that you are directed to the right person  Send all requests for admission clinical reviews, approved or denied determinations and any other requests to fax [phone]   All voicemails are confidential

## 2019-07-09 NOTE — PLAN OF CARE
Problem: Potential for Falls  Goal: Patient will remain free of falls  Description  INTERVENTIONS:  - Assess patient frequently for physical needs  -  Identify cognitive and physical deficits and behaviors that affect risk of falls    -  [place] fall precautions as indicated by assessment   - Educate patient/family on patient safety including physical limitations  - Instruct patient to call for assistance with activity based on assessment  - Modify environment to reduce risk of injury  - Consider OT/PT consult to assist with strengthening/mobility  Outcome: Adequate for Discharge     Problem: PAIN - ADULT  Goal: Verbalizes/displays adequate comfort level or baseline comfort level  Description  Interventions:  - Encourage patient to monitor pain and request assistance  - Assess pain using appropriate pain scale  - Administer analgesics based on type and severity of pain and evaluate response  - Implement non-pharmacological measures as appropriate and evaluate response  - Consider cultural and social influences on pain and pain management  - Notify physician/advanced practitioner if interventions unsuccessful or patient reports new pain  Outcome: Adequate for Discharge     Problem: SAFETY ADULT  Goal: Maintain or return to baseline ADL function  Description  INTERVENTIONS:  -  Assess patient's ability to carry out ADLs; assess patient's baseline for ADL function and identify physical deficits which impact ability to perform ADLs (bathing, care of mouth/teeth, toileting, grooming, dressing, etc )  - Assess/evaluate cause of self-care deficits   - Assess range of motion  - Assess patient's mobility; develop plan if impaired  - Assess patient's need for assistive devices and provide as appropriate  - Encourage maximum independence but intervene and supervise when necessary  ¯ Involve family in performance of ADLs  ¯ Assess for home care needs following discharge   ¯ Request OT consult to assist with ADL evaluation and planning for discharge  ¯ Provide patient education as appropriate  Outcome: Adequate for Discharge  Goal: Maintain or return mobility status to optimal level  Description  INTERVENTIONS:  - Assess patient's baseline mobility status (ambulation, transfers, stairs, etc )    - Identify cognitive and physical deficits and behaviors that affect mobility  - Identify mobility aids required to assist with transfers and/or ambulation (gait belt, sit-to-stand, lift, walker, cane, etc )  - [place] fall precautions as indicated by assessment  - Record patient progress and toleration of activity level on Mobility SBAR; progress patient to next Phase/Stage  - Instruct patient to call for assistance with activity based on assessment  - Request Rehabilitation consult to assist with strengthening/weightbearing, etc   Outcome: Adequate for Discharge     Problem: NEUROSENSORY - ADULT  Goal: Achieves stable or improved neurological status  Description  INTERVENTIONS  - Monitor and report changes in neurological status  - Initiate measures to prevent increased intracranial pressure  - Maintain blood pressure and fluid volume within ordered parameters to optimize cerebral perfusion  - Monitor temperature, glucose, and sodium or any other associated labs   Initiate appropriate interventions as ordered  - Monitor for seizure activity   - Administer anti-seizure medications as ordered  Outcome: Adequate for Discharge  Goal: Achieves maximal functionality and self care  Description  INTERVENTIONS  - Monitor swallowing and airway patency with patient fatigue and changes in neurological status  - Encourage and assist patient to increase activity and self care with guidance from rehab services  - Encourage visually impaired, hearing impaired and aphasic patients to use assistive/communication devices  Outcome: Adequate for Discharge     Problem: CARDIOVASCULAR - ADULT  Goal: Maintains optimal cardiac output and hemodynamic stability  Description  INTERVENTIONS:  - Monitor I/O, vital signs and rhythm  - Monitor for S/S and trends of decreased cardiac output i e  bleeding, hypotension  - Administer and titrate ordered vasoactive medications to optimize hemodynamic stability  - Assess quality of pulses, skin color and temperature  - Assess for signs of decreased coronary artery perfusion - ex   Angina  - Instruct patient to report change in severity of symptoms  Outcome: Adequate for Discharge  Goal: Absence of cardiac dysrhythmias or at baseline rhythm  Description  INTERVENTIONS:  - Continuous cardiac monitoring, monitor vital signs, obtain 12 lead EKG if indicated  - Administer antiarrhythmic and heart rate control medications as ordered  - Monitor electrolytes and administer replacement therapy as ordered  Outcome: Adequate for Discharge     Problem: METABOLIC, FLUID AND ELECTROLYTES - ADULT  Goal: Electrolytes maintained within normal limits  Description  INTERVENTIONS:  - Monitor labs and assess patient for signs and symptoms of electrolyte imbalances  - Administer electrolyte replacement as ordered  - Monitor response to electrolyte replacements, including repeat lab results as appropriate  - Instruct patient on fluid and nutrition as appropriate  Outcome: Adequate for Discharge  Goal: Fluid balance maintained  Description  INTERVENTIONS:  - Monitor labs and assess for signs and symptoms of volume excess or deficit  - Monitor I/O and WT  - Instruct patient on fluid and nutrition as appropriate  Outcome: Adequate for Discharge     Problem: SKIN/TISSUE INTEGRITY - ADULT  Goal: Skin integrity remains intact  Description  INTERVENTIONS  - Identify patients at risk for skin breakdown  - Assess and monitor skin integrity  - Assess and monitor nutrition and hydration status  - Monitor labs (i e  albumin)  - Assess for incontinence   - Turn and reposition patient  - Assist with mobility/ambulation  - Relieve pressure over bony prominences  - Avoid friction and shearing  - Provide appropriate hygiene as needed including keeping skin clean and dry  - Evaluate need for skin moisturizer/barrier cream  - Collaborate with interdisciplinary team (i e  Nutrition, Rehabilitation, etc )   - Patient/family teaching  Outcome: Adequate for Discharge

## 2019-07-09 NOTE — NURSING NOTE
At approximately 0130 pt stating she cannot sleep and would like to know if she could have something additional to help her sleep  Pt was informed that she received IV benadryl and Ambien along with her scheduled dose of Ativan 2mg  Pt states she wont be able to leave tomorrow if she hasn't slept and has not slept the night before  Slim was notified  No new orders received  Around 0230 pt c/o HA  Pharmacy messaged and request for PRN IVPB Solumedrol sent  Bag sent to floor around 0300  At 0315, when going to administer medication, pt found sleeping  Medication not administered at this time as to not wake pt  Will continue to monitor pt

## 2019-07-09 NOTE — DISCHARGE SUMMARY
Discharge Summary - [facility]    Patient Information: Archie Carpenter 59 y o  female MRN: [id number]  Unit/Bed#: E4 -01 Encounter: [id number]    Discharging Physician / Practitioner: Tori Mckeon MD  PCP: Sai Pretty DO  Admission Date: 7/6/2019  Discharge Date: 07/09/19    Reason for Admission:  Syncope    Discharge Diagnoses:  Syncope possibly triggered by migraine    Principal Problem:    Syncope  Active Problems:    Bipolar depression ([facility] )    Intractable migraine    DV[facility], lower extremity ([facility] )  Resolved Problems:    * No resolved hospital problems  *      Consultations During Hospital Stay:  · Neurology    Procedures Performed:     Ct Head Without Contrast    Result Date: 7/6/2019  Narrative: CT BRAIN - WITHOUT CONTRAST INDICATION:   syncope, fall head injury Eliquis  COMPARISON:  None  TECHNIQUE:  CT examination of the brain was performed  In addition to axial images, coronal 2D reformatted images were created and submitted for interpretation  Radiation dose length product (DLP) for this visit:  987 mGy-cm   This examination, like all CT scans performed in the [facility], was performed utilizing techniques to minimize radiation dose exposure, including the use of iterative reconstruction and automated exposure control  IMAGE QUALITY:  Diagnostic  FINDINGS: PARENCHYMA:  No intracranial mass, mass effect or midline shift  No CT signs of acute infarction  No acute parenchymal hemorrhage  VENTRICLES AND EXTRA-AXIAL SPACES:  Normal for the patient's age  VISUALIZED ORBITS AND PARANASAL SINUSES:  Unremarkable  CALVARIUM AND EXTRACRANIAL SOFT TISSUES:  Normal      Impression: No acute intracranial abnormality  Workstation performed: [id number]     Ct Cervical Spine Without Contrast    Result Date: 7/6/2019  Narrative: CT CERVICAL SPINE - WITHOUT CONTRAST INDICATION:   fall, injury  COMPARISON:  Cervical spine CT 1/4/2018   TECHNIQUE:  CT examination of the cervical spine was performed without intravenous contrast   Contiguous axial images were obtained  Sagittal and coronal reconstructions were performed  Radiation dose length product (DLP) for this visit:  602 mGy-cm   This examination, like all CT scans performed in the [facility], was performed utilizing techniques to minimize radiation dose exposure, including the use of iterative reconstruction and automated exposure control  IMAGE QUALITY:  Diagnostic  FINDINGS: ALIGNMENT:  There is straightening of normal cervical lordosis  No subluxation or compression deformity  VERTEBRAL BODIES:  No fracture  DEGENERATIVE CHANGES:  Mild multilevel cervical degenerative changes are noted without critical central canal stenosis  PREVERTEBRAL AND PARASPINAL SOFT TISSUES:  Unremarkable  THORACIC INLET:  Normal      Impression: No cervical spine fracture or traumatic malalignment  Workstation performed: [id number]         Significant Findings:     68-year-old female with a history of recurrent syncope today while at her place of residence states she felt dizzy and and leg buckling fell against a entertainment center(she described to the ED physician falling backwards and told me she fell forwards) describing apparent loss of consciousness although her usual syncopal attacks are syncope and collapse and no preceding symptoms of dizziness or lightheadedness  She presented to the ED with concerns of striking her head and being on Eliquis where workup Ng entailed a CT of the cervical spine and brain that was unremarkable for any signs of bleeding she then preceded to have complaints of increasing dizziness unable to obtain upright status and increasing headache described as frontal to her back atypical for history of migraines will that usually have an aura and states herself that this is quite different than her usual migraines  A migraine cocktail given to her had no result    She also complains of some nausea and on obtaining sitting up in litter became further dizzy   She denied any numbness tingling weakness heaviness or clonus of limb change in vision hearing or speech difficulty and on presentation did not have headache until after initial evaluation in ED    Plan for the Primary Problem(s):  · Syncope with a history of recurrent syncope of  unclear etiology, she is on numerous psychoactive medications was not no signs of QT prolongation there is some contradictory aspects to her history however  Will  admitted to observation status  · Seen by Neurology with largely negative workup with imaging and treated for migraine component headaches with DHE and steroids  ? Headache that seems to be more post concussive than her typical migraine was seen by Neurology and treat his migraine is some relief last night but again related headache this morning to Neurology but not to me and will need to be assess further  I see no signs of head trauma in her scalp and CT imaging was unremarkable of cervical spine and brain/think there is also emotional component involved and may not be organic/  ? History of DVT right upper extremity after usage of a PICC line will continue on Eliquis as no signs of hemorrhagic changes on CT imaging  ? Bipolar depression continue on present meds including Haldol, Luvox of which MAR list she is taking 300 mg q h s  Which is double the maximum dosage also on Zonegran at high dosage which is indicated for focal onset seizures also on Lamictal 200 and Abilify  ?  Sleep disorder and may benefit from sleep study with all medications she is on suggested also she see her outpatient psychiatrist for dosage adjustments as may be also contributing to her headaches     Plan for Additional Problems:   · Cardiac murmur heard on auscultation she denies having prior history of heart murmur and states she will over a year since she has had an echocardiogram which is hard to believe given the recurrent syncope  she has had but will order 2D echocardiogram last stress test in January 2018 apparently normal doubt cardiac syncope in this setting but will place on telemetry to watch QT intervals/2D echocardiogram results still pending at time of discharge  · Ambulatory dysfunction possibly in relation to post concussive state/deconditioning/vertigo? will obtain PT/OT  and recommended outpatient physical therapy prescription given  · Diagnostic differential listed in chart notes adrenal insufficiency but she is not on steroids and no signs of sodium or potassium abnormalities           Incidental Findings:   ·     Test Results Pending at Discharge (will require follow up):   · 2D echocardiogram results still pending     Outpatient Tests Requested:  ·     Complications:      Hospital Course:     Janett Lund is a 59 y o  female patient who originally presented to the hospital on 7/6/2019 due to to syncope which is been recurrent in the past please see above significant findings for hospital course and treatment plan    Condition at Discharge: good     Discharge Day Visit / Exam:     * Please refer to separate progress for these details *    Discharge instructions/Information to patient and family:   See after visit summary for information provided to patient and family  Provisions for Follow-Up Care:  See after visit summary for information related to follow-up care and any pertinent home health orders  Disposition:     Home    For Discharges to [facility] SNF:   · Not Applicable to this Patient - Not Applicable to this Patient      Discharge Statement:  I spent 56 minutes discharging the patient  This time was spent on the day of discharge  I had direct contact with the patient on the day of discharge  Greater than 50% of the total time was spent examining patient, answering all patient questions, arranging and discussing plan of care with patient as well as directly providing post-discharge instructions  Additional time then spent on discharge activities  Discharge Medications:  See after visit summary for reconciled discharge medications provided to patient and family  ** Please Note: Dragon 360 Dictation voice to text software may have been used in the creation of this document   **

## 2019-07-09 NOTE — NURSING NOTE
Pt discharged to home to f/u w/ outpt  PT  A&OX4, able to identify needs  Minimal assist to independent w/ ADL's  D/C summary explained and provided w/ no scripts needed  Pt receptive and compliant w/ teaching  Peripheral IV removed and gauze dsg intact  All belongings verified upon d/c and returned w/ pt  Pt assisted out of facility via w/c w/ PCA  Personal transport provided

## 2019-07-30 ENCOUNTER — APPOINTMENT (EMERGENCY)
Dept: CT IMAGING | Facility: HOSPITAL | Age: 59
End: 2019-07-30
Payer: COMMERCIAL

## 2019-07-30 ENCOUNTER — HOSPITAL ENCOUNTER (EMERGENCY)
Facility: HOSPITAL | Age: 59
Discharge: HOME | End: 2019-07-30
Attending: EMERGENCY MEDICINE
Payer: COMMERCIAL

## 2019-07-30 ENCOUNTER — APPOINTMENT (EMERGENCY)
Dept: RADIOLOGY | Facility: HOSPITAL | Age: 59
End: 2019-07-30
Payer: COMMERCIAL

## 2019-07-30 VITALS
TEMPERATURE: 98.9 F | BODY MASS INDEX: 36.49 KG/M2 | SYSTOLIC BLOOD PRESSURE: 149 MMHG | WEIGHT: 240 LBS | OXYGEN SATURATION: 97 % | RESPIRATION RATE: 18 BRPM | DIASTOLIC BLOOD PRESSURE: 72 MMHG | HEART RATE: 89 BPM

## 2019-07-30 DIAGNOSIS — S52.124A CLOSED NONDISPLACED FRACTURE OF HEAD OF RIGHT RADIUS, INITIAL ENCOUNTER: ICD-10-CM

## 2019-07-30 DIAGNOSIS — S09.90XA INJURY OF HEAD, INITIAL ENCOUNTER: ICD-10-CM
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
## 2019-07-30 PROCEDURE — 70450 CT HEAD/BRAIN W/O DYE: CPT

## 2019-07-30 PROCEDURE — 99284 EMERGENCY DEPT VISIT MOD MDM: CPT

## 2019-07-30 PROCEDURE — 73030 X-RAY EXAM OF SHOULDER: CPT

## 2019-07-30 PROCEDURE — 72125 CT NECK SPINE W/O DYE: CPT

## 2019-07-30 PROCEDURE — 73560 X-RAY EXAM OF KNEE 1 OR 2: CPT

## 2019-07-30 PROCEDURE — 73080 X-RAY EXAM OF ELBOW: CPT

## 2019-07-30 PROCEDURE — 99284 EMERGENCY DEPT VISIT MOD MDM: CPT | Performed by: PHYSICIAN ASSISTANT

## 2019-07-30 RX ORDER — ACETAMINOPHEN 500 MG
500 TABLET ORAL EVERY 6 HOURS PRN
Qty: 30 TABLET | Refills: 0 | Status: SHIPPED | OUTPATIENT
Start: 2019-07-30

## 2019-07-30 RX ORDER — FERROUS SULFATE 325(65) MG
325 TABLET ORAL
COMMUNITY

## 2019-07-30 RX ORDER — MEXILETINE HYDROCHLORIDE 150 MG/1
150 CAPSULE ORAL 3 TIMES DAILY
COMMUNITY
End: 2020-09-09

## 2019-07-30 RX ORDER — ACETAMINOPHEN 325 MG/1
650 TABLET ORAL ONCE
Status: COMPLETED | OUTPATIENT
Start: 2019-07-30 | End: 2019-07-30

## 2019-07-30 RX ORDER — MELATONIN
5000 DAILY
COMMUNITY

## 2019-07-30 RX ORDER — LIDOCAINE 50 MG/G
1 PATCH TOPICAL ONCE
Status: DISCONTINUED | OUTPATIENT
Start: 2019-07-30 | End: 2019-07-30 | Stop reason: HOSPADM

## 2019-07-30 RX ADMIN — ACETAMINOPHEN 650 MG: 325 TABLET ORAL at 16:55

## 2019-07-30 RX ADMIN — LIDOCAINE 1 PATCH: 50 PATCH TOPICAL at 16:54

## 2019-07-30 NOTE — ED PROVIDER NOTES
History  Chief Complaint   Patient presents with    Fall     slipped while cleaning shower, fell into wall and hit forehead  c/o head/neck/back pain  no LOC before or after fall  Patient is a 20-year-old female presents today with a chief complaint of fall  Patient reports she was cleaning the shower and fell onto her right side due to slipping striking her right temporal and occipital aspect of her head, right elbow and right knee  Patient is reporting a right sided headache and midline spinal tenderness at the cervical spine as well as right-sided elbow and shoulder pain and right-sided knee pain  Patient denies any numbness, tingling, weakness, paresthesias, paralysis associated with her pain and notes that the pain is an aching in nature in all locations  Patient reports she was able to ambulate with the EMS crew after assistance getting out of the tub  Patient reports she does not believe she lost consciousness and is not vomiting or having any difficulty speaking  Patient denies any vision changes associated with her posttraumatic headache  History provided by:  Patient and EMS personnel   used: No    Fall   Mechanism of injury: fall    Incident location:  Bathroom  Arrived directly from scene: yes    Fall:     Fall occurred:  Standing    Point of impact:  Head and knees    Entrapped after fall: no    Suspicion of alcohol use: no    Suspicion of drug use: no    Associated symptoms: neck pain    Associated symptoms: no abdominal pain, no chest pain, no nausea and no vomiting        Prior to Admission Medications   Prescriptions Last Dose Informant Patient Reported? Taking?    ARIPiprazole (ABILIFY) 5 mg tablet   No No   Sig: Take 1 tablet by mouth daily   Patient taking differently: Take 15 mg by mouth daily     Erenumab-aooe (AIMOVIG) 140 MG/ML SOAJ   Yes No   Sig: Inject 140 mg under the skin every 30 (thirty) days    LORazepam (ATIVAN) 1 mg tablet   Yes No   Sig: Take 2 mg by mouth 3 (three) times a day     apixaban (ELIQUIS) 5 mg   Yes No   Sig: Take 5 mg by mouth 2 (two) times a day   cholecalciferol (VITAMIN D3) 1,000 units tablet   Yes Yes   Sig: Take 5,000 Units by mouth daily   eszopiclone (LUNESTA) 3 MG tablet Not Taking at Unknown time  Yes No   Sig: Take 3 mg by mouth daily   ferrous sulfate 325 (65 Fe) mg tablet   Yes Yes   Sig: Take 325 mg by mouth daily with breakfast   fluvoxaMINE (LUVOX) 100 mg tablet   Yes No   Sig: Take 300 mg by mouth daily at bedtime     gabapentin (NEURONTIN) 600 MG tablet   Yes No   Sig: Take 600 mg by mouth 3 (three) times a day    haloperidol (HALDOL) 5 mg tablet   Yes No   Sig: Take one half tab orally twice a day as needed for headache or nausea  Max 2 tabs/day, 3 days/week   lamoTRIgine (LaMICtal) 200 MG tablet   Yes No   Sig: Take 200 mg by mouth daily     mexiletine (MEXITIL) 150 mg capsule   Yes Yes   Sig: Take 150 mg by mouth 3 (three) times a day   zonisamide (ZONEGRAN) 50 MG capsule  Self No No   Sig: Take 1 capsule by mouth daily   Patient taking differently: Take 200 mg by mouth daily       Facility-Administered Medications: None       Past Medical History:   Diagnosis Date    Adrenal insufficiency [facility]Andrés's disease) [facility] )     Bipolar disorder     Cervical radiculopathy     Chronic back pain     [facility]    Fibromyalgia     History of TIAs     cannot remember details    Hypertension     Hypokalemia     Migraine     MRSA (methicillin resistant Staphylococcus aureus) 07/20/2012    nasal swab negative 4/5/19    Psychiatric disorder     Anxiety, major depression, bipolar    Spinal stenosis     Syncope 2014    orthostatic hypotension       Past Surgical History:   Procedure Laterality Date    APPENDECTOMY      GASTRIC RESTRICTION SURGERY      Gastric Sleeve Nov 2015    HYSTERECTOMY      JOINT REPLACEMENT      Left Knee 2011 and Right Hip 2010       Family History   Problem Relation Age of Onset  Breast cancer Mother     Migraines Mother     Arthritis Mother     Kidney disease Father     Heart disease Father     Diabetes Father     Arthritis Father     Brain cancer Brother     Seizures Brother      I have reviewed and agree with the history as documented  Social History     Tobacco Use    Smoking status: Former Smoker     Packs/day: 0 20     Types: Cigarettes     Last attempt to quit:      Years since quittin 5    Smokeless tobacco: Never Used   Substance Use Topics    Alcohol use: Not Currently     Frequency: Never     Binge frequency: Never    Drug use: No        Review of Systems   Constitutional: Negative for chills, fatigue and fever  HENT: Negative for congestion, ear pain, rhinorrhea and sore throat  Eyes: Negative for redness  Respiratory: Negative for chest tightness and shortness of breath  Cardiovascular: Negative for chest pain and palpitations  Gastrointestinal: Negative for abdominal pain, nausea and vomiting  Genitourinary: Negative for dysuria and hematuria  Musculoskeletal: Positive for arthralgias and neck pain  Skin: Negative for rash  Neurological: Negative for dizziness, syncope, light-headedness and numbness  Physical Exam  Physical Exam   Constitutional: She is oriented to person, place, and time  She appears well-developed and well-nourished  HENT:   Head: Normocephalic  Eyes: No scleral icterus  Neck: Spinous process tenderness present  Decreased range of motion present  Midline spinal tenderness, no crepitus, deformities, step offs or skin changes noted to area of pain   Cardiovascular: Normal rate and regular rhythm  Pulmonary/Chest: Effort normal and breath sounds normal  No stridor  Abdominal: Soft  She exhibits no distension  There is no tenderness  Musculoskeletal: She exhibits tenderness  Arms:       Legs:  Tenderness in all locations depicted above      Full sensation, normal ROM in shoulder, elbow, and knee    Neurological: She is alert and oriented to person, place, and time  No cranial nerve deficit  She exhibits normal muscle tone  Skin: Skin is warm and dry  Capillary refill takes less than 2 seconds  Psychiatric: She has a normal mood and affect  Nursing note and vitals reviewed  Vital Signs  ED Triage Vitals   Temperature Pulse Respirations Blood Pressure SpO2   07/30/19 1455 07/30/19 1455 07/30/19 1455 07/30/19 1455 07/30/19 1455   98 9 °F (37 2 °C) 90 16 134/76 99 %      Temp Source Heart Rate Source Patient Position - Orthostatic VS BP Location FiO2 (%)   07/30/19 1455 07/30/19 1455 07/30/19 1455 07/30/19 1455 --   Tympanic Monitor Lying Left arm       Pain Score       07/30/19 1523       8           Vitals:    07/30/19 1455 07/30/19 1651   BP: 134/76 149/72   Pulse: 90 89   Patient Position - Orthostatic VS: Lying Lying         Visual Acuity  Visual Acuity      Most Recent Value   L Pupil Size (mm)  4   R Pupil Size (mm)  4          ED Medications  Medications   lidocaine (LIDODERM) 5 % patch 1 patch (1 patch Topical Medication Applied 7/30/19 1654)   acetaminophen (TYLENOL) tablet 650 mg (650 mg Oral Given 7/30/19 1655)       Diagnostic Studies  Results Reviewed     None                 XR shoulder 2+ views RIGHT   ED Interpretation by Dayami Mcintosh PA-C (07/30 1641)   Arthritic changes noted, no acute fractures visualized      Final Result by Justice Glover MD (07/30 1648)      No acute osseous abnormality  Workstation performed: [id number]         XR elbow 3+ vw RIGHT   ED Interpretation by Dayami Mcintosh PA-C (07/30 1640)   Arthritic changes noted, no acute fractures visualized      Final Result by Justice Glover MD (07/30 1653)      Proximal right radial neck impacted nondisplaced fracture  The study was marked in EPIC for significant notification        Workstation performed: [id number]         XR knee 1 or 2 views right   ED Interpretation by Dayami Mcintosh SIRISHA (07/30 1641)   Arthritic changes noted, no acute fracture visualized      Final Result by Kan Craft MD (07/30 1658)      No acute osseous abnormality  Workstation performed: [id number]         CT spine cervical without contrast   Final Result by Marsha Mcintosh MD (07/30 1615)      No cervical spine fracture or traumatic malalignment  Workstation performed: [id number]         CT head without contrast   Final Result by Marsha Mcintosh MD (07/30 1604)      No acute intracranial abnormality  Workstation performed: [id number]                    Procedures  Splint application  Date/Time: 7/30/2019 5:04 PM  Performed by: Carola Wiggins PA-C  Authorized by: Carola Wiggins PA-C     Patient location:  Bedside  Procedure performed by emergency physician: Yes    Other Assisting Provider: Yes (comment)    Consent:     Consent obtained:  Verbal    Consent given by:  Patient    Risks discussed:  Pain    Alternatives discussed:  No treatment  Universal protocol:     Procedure explained and questions answered to patient or proxy's satisfaction: yes      Patient identity confirmed:  Verbally with patient  Indication:     Indications: fracture    Pre-procedure details:     Sensation:  Normal  Procedure details:     Laterality:  Right    Location:  Elbow    Elbow:  R elbow    Strapping: yes      Splint type:  Long arm (from mid humerus to wrist)    Supplies:  Cotton padding, elastic bandage, Ortho-Glass and sling  Post-procedure details:     Pain:  Improved    Sensation:  Normal    Neurovascular Exam: skin pink      Patient tolerance of procedure: Tolerated well, no immediate complications           ED Course  ED Course as of Jul 30 1705   Tue Jul 30, 2019   1643 Patient was reexamined at this time and C-collar was cleared by myself    Return to emergency department criteria was reviewed the patient verbalized understanding and was agreeable to discharge and treatment plan Tylenol and lidocaine gel as needed for pain      1656 XR elbow 3+ vw RIGHT   1703 Proximal right radial head and neck in impacted nondisplaced fracture  1703   Short arm Splint was placed by myself and ED Tech Jacqueline  Examined by me post splint placement  Splint is in good position and neurovascular exam intact after splint placement  MDM    Disposition  Final diagnoses:   Fall, initial encounter   Injury of head, initial encounter   Closed nondisplaced fracture of head of right radius, initial encounter     Time reflects when diagnosis was documented in both MDM as applicable and the Disposition within this note     Time User Action Codes Description Comment    7/30/2019  4:44 PM Aretta Muck Add [W19  TNWV] Fall, initial encounter     7/30/2019  4:44 PM Moon Sepulveda [S63 94DQ] Injury of head, initial encounter     7/30/2019  5:04 PM Aretta Muck Add [S52 124A] Closed nondisplaced fracture of head of right radius, initial encounter       ED Disposition     ED Disposition Condition Date/Time Comment    Discharge Good arnold Jul 30, 2019  4:43 PM Rob Wadsworth discharge to home/self care              Follow-up Information     Follow up With Specialties Details Why Contact Info    Jarrod Sarabia DO  Schedule an appointment as soon as possible for a visit  As needed [address]      Howard Santos MD Orthopedic Surgery Schedule an appointment as soon as possible for a visit in 2 days  [facility] [id number]  [phone]            Patient's Medications   Discharge Prescriptions    ACETAMINOPHEN (TYLENOL) 500 MG TABLET    Take 1 tablet (500 mg total) by mouth every 6 (six) hours as needed (pain)       Start Date: 7/30/2019 End Date: --       Order Dose: 500 mg       Quantity: 30 tablet    Refills: 0    LIDOCAINE (XYLOCAINE) 2 % TOPICAL GEL    Apply 1 application topically as needed for mild pain       Start Date: 7/30/2019 End Date: --       Order Dose: 1 application       Quantity: 30 mL    Refills: 0     No discharge procedures on file      ED Provider  Electronically Signed by           Dayami Mcintosh PA-C  07/30/19 7730

## 2019-10-02 ENCOUNTER — HOSPITAL ENCOUNTER (INPATIENT)
Facility: HOSPITAL | Age: 59
LOS: 3 days | Discharge: HOME HEALTH | DRG: 103 | End: 2019-10-07
Attending: EMERGENCY MEDICINE | Admitting: INTERNAL MEDICINE
Payer: COMMERCIAL

## 2019-10-02 ENCOUNTER — APPOINTMENT (EMERGENCY)
Dept: CT IMAGING | Facility: HOSPITAL | Age: 59
DRG: 103 | End: 2019-10-02
Payer: COMMERCIAL

## 2019-10-02 DIAGNOSIS — R42 DIZZINESS: ICD-10-CM

## 2019-10-02 DIAGNOSIS — R42 VERTIGO: ICD-10-CM

## 2019-10-02 DIAGNOSIS — S09.90XA MINOR HEAD INJURY, INITIAL ENCOUNTER: ICD-10-CM

## 2019-10-02 DIAGNOSIS — S16.1XXA STRAIN OF NECK MUSCLE, INITIAL ENCOUNTER: ICD-10-CM

## 2019-10-02 DIAGNOSIS — R55 SYNCOPE: Primary | ICD-10-CM

## 2019-10-02 DIAGNOSIS — G43.019 INTRACTABLE MIGRAINE WITHOUT AURA AND WITHOUT STATUS MIGRAINOSUS: ICD-10-CM

## 2019-10-02 PROBLEM — Z86.69 HISTORY OF MIGRAINE: Status: ACTIVE | Noted: 2019-10-02

## 2019-10-02 LAB
ANION GAP SERPL CALCULATED.3IONS-SCNC: 8 MMOL/L (ref 4–13)
ATRIAL RATE: 74 BPM
BASOPHILS # BLD AUTO: 0.04 THOUSANDS/ΜL (ref 0–0.1)
BASOPHILS NFR BLD AUTO: 1 % (ref 0–1)
BUN SERPL-MCNC: 18 MG/DL (ref 5–25)
CALCIUM SERPL-MCNC: 8.9 MG/DL (ref 8.3–10.1)
CHLORIDE SERPL-SCNC: 108 MMOL/L (ref 100–108)
CO2 SERPL-SCNC: 29 MMOL/L (ref 21–32)
CREAT SERPL-MCNC: 0.82 MG/DL (ref 0.6–1.3)
EOSINOPHIL # BLD AUTO: 0.16 THOUSAND/ΜL (ref 0–0.61)
EOSINOPHIL NFR BLD AUTO: 2 % (ref 0–6)
ERYTHROCYTE [DISTWIDTH] IN BLOOD BY AUTOMATED COUNT: 17.9 % (ref 11.6–15.1)
GFR SERPL CREATININE-BSD FRML MDRD: 78 ML/MIN/1.73SQ M
GLUCOSE SERPL-MCNC: 98 MG/DL (ref 65–140)
HCT VFR BLD AUTO: 39.2 % (ref 34.8–46.1)
HGB BLD-MCNC: 12.3 G/DL (ref 11.5–15.4)
IMM GRANULOCYTES # BLD AUTO: 0.05 THOUSAND/UL (ref 0–0.2)
IMM GRANULOCYTES NFR BLD AUTO: 1 % (ref 0–2)
LYMPHOCYTES # BLD AUTO: 1.93 THOUSANDS/ΜL (ref 0.6–4.47)
LYMPHOCYTES NFR BLD AUTO: 29 % (ref 14–44)
MAGNESIUM SERPL-MCNC: 2.4 MG/DL (ref 1.6–2.6)
MCH RBC QN AUTO: 29.9 PG (ref 26.8–34.3)
MCHC RBC AUTO-ENTMCNC: 31.4 G/DL (ref 31.4–37.4)
MCV RBC AUTO: 95 FL (ref 82–98)
MONOCYTES # BLD AUTO: 0.85 THOUSAND/ΜL (ref 0.17–1.22)
MONOCYTES NFR BLD AUTO: 13 % (ref 4–12)
NEUTROPHILS # BLD AUTO: 3.65 THOUSANDS/ΜL (ref 1.85–7.62)
NEUTS SEG NFR BLD AUTO: 54 % (ref 43–75)
NRBC BLD AUTO-RTO: 0 /100 WBCS
P AXIS: 36 DEGREES
PLATELET # BLD AUTO: 285 THOUSANDS/UL (ref 149–390)
PMV BLD AUTO: 9.5 FL (ref 8.9–12.7)
POTASSIUM SERPL-SCNC: 4 MMOL/L (ref 3.5–5.3)
PR INTERVAL: 160 MS
QRS AXIS: -11 DEGREES
QRSD INTERVAL: 88 MS
QT INTERVAL: 428 MS
QTC INTERVAL: 475 MS
RBC # BLD AUTO: 4.12 MILLION/UL (ref 3.81–5.12)
SODIUM SERPL-SCNC: 145 MMOL/L (ref 136–145)
T WAVE AXIS: 20 DEGREES
VENTRICULAR RATE: 74 BPM
WBC # BLD AUTO: 6.68 THOUSAND/UL (ref 4.31–10.16)

## 2019-10-02 PROCEDURE — 96374 THER/PROPH/DIAG INJ IV PUSH: CPT

## 2019-10-02 PROCEDURE — 36415 COLL VENOUS BLD VENIPUNCTURE: CPT | Performed by: EMERGENCY MEDICINE

## 2019-10-02 PROCEDURE — 93005 ELECTROCARDIOGRAM TRACING: CPT

## 2019-10-02 PROCEDURE — 70450 CT HEAD/BRAIN W/O DYE: CPT

## 2019-10-02 PROCEDURE — 72125 CT NECK SPINE W/O DYE: CPT

## 2019-10-02 PROCEDURE — 83735 ASSAY OF MAGNESIUM: CPT | Performed by: PHYSICIAN ASSISTANT

## 2019-10-02 PROCEDURE — 99285 EMERGENCY DEPT VISIT HI MDM: CPT | Performed by: EMERGENCY MEDICINE

## 2019-10-02 PROCEDURE — 85025 COMPLETE CBC W/AUTO DIFF WBC: CPT | Performed by: EMERGENCY MEDICINE

## 2019-10-02 PROCEDURE — 99220 PR INITIAL OBSERVATION CARE/DAY 70 MINUTES: CPT | Performed by: PHYSICIAN ASSISTANT

## 2019-10-02 PROCEDURE — 93010 ELECTROCARDIOGRAM REPORT: CPT | Performed by: INTERNAL MEDICINE

## 2019-10-02 PROCEDURE — 96375 TX/PRO/DX INJ NEW DRUG ADDON: CPT

## 2019-10-02 PROCEDURE — 99285 EMERGENCY DEPT VISIT HI MDM: CPT

## 2019-10-02 PROCEDURE — 80048 BASIC METABOLIC PNL TOTAL CA: CPT | Performed by: EMERGENCY MEDICINE

## 2019-10-02 PROCEDURE — 96361 HYDRATE IV INFUSION ADD-ON: CPT

## 2019-10-02 RX ORDER — KETOROLAC TROMETHAMINE 30 MG/ML
15 INJECTION, SOLUTION INTRAMUSCULAR; INTRAVENOUS ONCE
Status: COMPLETED | OUTPATIENT
Start: 2019-10-02 | End: 2019-10-02

## 2019-10-02 RX ORDER — ACETAMINOPHEN 325 MG/1
650 TABLET ORAL EVERY 6 HOURS PRN
Status: DISCONTINUED | OUTPATIENT
Start: 2019-10-02 | End: 2019-10-07 | Stop reason: HOSPADM

## 2019-10-02 RX ORDER — LAMOTRIGINE 100 MG/1
200 TABLET ORAL DAILY
Status: DISCONTINUED | OUTPATIENT
Start: 2019-10-03 | End: 2019-10-04

## 2019-10-02 RX ORDER — GABAPENTIN 300 MG/1
600 CAPSULE ORAL 3 TIMES DAILY
Status: DISCONTINUED | OUTPATIENT
Start: 2019-10-02 | End: 2019-10-07 | Stop reason: HOSPADM

## 2019-10-02 RX ORDER — LORAZEPAM 1 MG/1
2 TABLET ORAL 3 TIMES DAILY
Status: DISCONTINUED | OUTPATIENT
Start: 2019-10-02 | End: 2019-10-07 | Stop reason: HOSPADM

## 2019-10-02 RX ORDER — FERROUS SULFATE 325(65) MG
325 TABLET ORAL
Status: DISCONTINUED | OUTPATIENT
Start: 2019-10-03 | End: 2019-10-03

## 2019-10-02 RX ORDER — SODIUM CHLORIDE 9 MG/ML
75 INJECTION, SOLUTION INTRAVENOUS CONTINUOUS
Status: DISCONTINUED | OUTPATIENT
Start: 2019-10-02 | End: 2019-10-03

## 2019-10-02 RX ORDER — DIPHENHYDRAMINE HYDROCHLORIDE 50 MG/ML
25 INJECTION INTRAMUSCULAR; INTRAVENOUS ONCE
Status: COMPLETED | OUTPATIENT
Start: 2019-10-02 | End: 2019-10-02

## 2019-10-02 RX ORDER — TRAMADOL HYDROCHLORIDE 50 MG/1
50 TABLET ORAL EVERY 6 HOURS PRN
Status: DISCONTINUED | OUTPATIENT
Start: 2019-10-02 | End: 2019-10-02

## 2019-10-02 RX ORDER — ZONISAMIDE 100 MG/1
200 CAPSULE ORAL DAILY
Status: DISCONTINUED | OUTPATIENT
Start: 2019-10-03 | End: 2019-10-07 | Stop reason: HOSPADM

## 2019-10-02 RX ORDER — ACETAMINOPHEN 325 MG/1
650 TABLET ORAL ONCE
Status: COMPLETED | OUTPATIENT
Start: 2019-10-02 | End: 2019-10-02

## 2019-10-02 RX ORDER — MEXILETINE HYDROCHLORIDE 150 MG/1
150 CAPSULE ORAL 3 TIMES DAILY
Status: DISCONTINUED | OUTPATIENT
Start: 2019-10-02 | End: 2019-10-07 | Stop reason: HOSPADM

## 2019-10-02 RX ORDER — ARIPIPRAZOLE 5 MG/1
5 TABLET ORAL DAILY
Status: DISCONTINUED | OUTPATIENT
Start: 2019-10-03 | End: 2019-10-07 | Stop reason: HOSPADM

## 2019-10-02 RX ORDER — MECLIZINE HCL 12.5 MG/1
12.5 TABLET ORAL EVERY 8 HOURS SCHEDULED
Status: DISCONTINUED | OUTPATIENT
Start: 2019-10-02 | End: 2019-10-07 | Stop reason: HOSPADM

## 2019-10-02 RX ORDER — ZOLPIDEM TARTRATE 5 MG/1
5 TABLET ORAL
Status: DISCONTINUED | OUTPATIENT
Start: 2019-10-02 | End: 2019-10-03

## 2019-10-02 RX ORDER — ONDANSETRON 2 MG/ML
4 INJECTION INTRAMUSCULAR; INTRAVENOUS EVERY 6 HOURS PRN
Status: DISCONTINUED | OUTPATIENT
Start: 2019-10-02 | End: 2019-10-07 | Stop reason: HOSPADM

## 2019-10-02 RX ORDER — DIAZEPAM 5 MG/ML
2.5 INJECTION, SOLUTION INTRAMUSCULAR; INTRAVENOUS ONCE
Status: COMPLETED | OUTPATIENT
Start: 2019-10-02 | End: 2019-10-02

## 2019-10-02 RX ORDER — FLUVOXAMINE MALEATE 50 MG/1
100 TABLET, COATED ORAL
Status: DISCONTINUED | OUTPATIENT
Start: 2019-10-02 | End: 2019-10-07 | Stop reason: HOSPADM

## 2019-10-02 RX ORDER — LIDOCAINE 50 MG/G
1 PATCH TOPICAL DAILY
Status: DISCONTINUED | OUTPATIENT
Start: 2019-10-02 | End: 2019-10-07 | Stop reason: HOSPADM

## 2019-10-02 RX ADMIN — KETOROLAC TROMETHAMINE 15 MG: 30 INJECTION, SOLUTION INTRAMUSCULAR at 17:22

## 2019-10-02 RX ADMIN — MEXILETINE HYDROCHLORIDE 150 MG: 150 CAPSULE ORAL at 21:09

## 2019-10-02 RX ADMIN — DIPHENHYDRAMINE HYDROCHLORIDE 25 MG: 50 INJECTION, SOLUTION INTRAMUSCULAR; INTRAVENOUS at 16:47

## 2019-10-02 RX ADMIN — DIAZEPAM 2.5 MG: 5 INJECTION, SOLUTION INTRAMUSCULAR; INTRAVENOUS at 17:51

## 2019-10-02 RX ADMIN — ACETAMINOPHEN 650 MG: 325 TABLET ORAL at 14:59

## 2019-10-02 RX ADMIN — LIDOCAINE 1 PATCH: 50 PATCH TOPICAL at 21:08

## 2019-10-02 RX ADMIN — FLUVOXAMINE MALEATE 100 MG: 50 TABLET, FILM COATED ORAL at 21:10

## 2019-10-02 RX ADMIN — GABAPENTIN 600 MG: 300 CAPSULE ORAL at 20:08

## 2019-10-02 RX ADMIN — LORAZEPAM 2 MG: 1 TABLET ORAL at 20:08

## 2019-10-02 RX ADMIN — SODIUM CHLORIDE 500 ML: 0.9 INJECTION, SOLUTION INTRAVENOUS at 16:46

## 2019-10-02 RX ADMIN — MECLIZINE 12.5 MG: 12.5 TABLET ORAL at 20:08

## 2019-10-02 RX ADMIN — SODIUM CHLORIDE 75 ML/HR: 0.9 INJECTION, SOLUTION INTRAVENOUS at 20:06

## 2019-10-02 RX ADMIN — ZOLPIDEM TARTRATE 5 MG: 5 TABLET, FILM COATED ORAL at 21:10

## 2019-10-02 RX ADMIN — APIXABAN 5 MG: 5 TABLET, FILM COATED ORAL at 20:08

## 2019-10-02 NOTE — H&P
H&P- Catalina Brown 1960, 59 y o  female MRN: [id number]    Unit/Bed#: E4 -01 Encounter: [id number]    Primary Care Provider: Melissa Egan DO   Date and time admitted to hospital: 10/2/2019  2:26 PM    * Postural dizziness  Assessment & Plan  · Presents to the emergency department with one day history of recurrent dizziness - appears peripheral in nature  History of recurrent syncope with otherwise unremarkable work up in the past   · Vital signs stable at time of admission  · CT head: "no acute intracranial abnormality"  · CT cervical: "No cervical spine fracture or traumatic malalignment "  · EKG: NSR, QTc borderline at 475  · Echo July 2019: EF 86% grade 1 diastolic dysfunction  · VAS carotid January 2018: bilateral carotids < 50% stenosis  · CBC and BMP WNL  Check magnesium  · Trial meclizine q 8 hours  Check orthostatic BP q shift  Continue gentle IVF  · Consider polypharmacy as patient is on multiple psychiatric medications and appears to have some inconsistency of what doses she is taking  · PT/OT consult    History of migraine  Assessment & Plan  · Currently denies any migrainous symptoms  · Multiple admissions previously for intractable migraine    History of DVT (deep vein thrombosis)  Assessment & Plan  · Continue Eliquis BID    Bipolar disorder  Assessment & Plan  · On several psychiatric medications as an outpatient  · Currently on Abilify, Luvox, ativan, lamictal  · Appears to have some inconsistency regarding doses    Patient reporting higher doses than those reported in the system/re-ordered during recent hospitalizations    Spinal stenosis of lumbar region  Assessment & Plan  · Chronic back pain  · Follows with [facility]    VTE Prophylaxis: Apixaban (Eliquis)  / sequential compression device   Code Status: Level 1 - Full Code  POLST: There is no POLST form on file for this patient (pre-hospital)  Discussion with family:  Discussed with patient currently at bedside    Anticipated Length of Stay:  Patient will be admitted on an Observation basis with an anticipated length of stay of  less than 2 midnights  Justification for Hospital Stay:  Dizziness    Total Time for Visit, including Counseling / Coordination of Care: 1 hour  Greater than 50% of this total time spent on direct patient counseling and coordination of care  Chief Complaint:   "I have been dizzy all day"    History of Present Illness:    Oscar Shankar is a 59 y o  female who presents with dizziness  Past medical history significant for bipolar disorder, migraines intractable in nature requiring multiple admissions, reports of prior syncope with negative workups, chronic pain related to osteoarthritis/stenosis of the spine  Patient states that she lives at home with her  who was gone most of the day at work  She reports that she has been having dizziness with standing up too quickly and moving her head, but then goes on to state that she feels sometimes it can "come out of nowhere"  She reports "collapsing suddenly" 3 times today, reportedly told the ER that she had loss of consciousness but tells me that she does not think she passed out  Denies any chest pain/shortness of breath, palpitations  Reports some nausea associated with severe dizzy spells  No episodes of vomiting  Denies any abdominal pain, constipation/diarrhea, fever/chills  Denies any numbness or tingling of the extremities, unilateral weakness  Denies any trouble swallowing, slurred speech, facial droop  Review of Systems:    Review of Systems   Constitutional: Positive for appetite change and fatigue  Negative for chills, fever and unexpected weight change  HENT: Negative for congestion, sore throat and trouble swallowing  Eyes: Negative for photophobia, pain and visual disturbance  Respiratory: Negative for cough, shortness of breath and wheezing      Cardiovascular: Negative for chest pain, palpitations and leg swelling  Gastrointestinal: Negative for abdominal pain, constipation, diarrhea, nausea and vomiting  Endocrine: Negative for polyuria  Genitourinary: Negative for difficulty urinating, dysuria, flank pain, hematuria and urgency  Musculoskeletal: Positive for neck pain  Negative for back pain, myalgias and neck stiffness  Skin: Negative for pallor and rash  Neurological: Positive for dizziness  Negative for tremors, syncope, weakness, light-headedness, numbness and headaches  Hematological: Does not bruise/bleed easily  Psychiatric/Behavioral: Negative for agitation and confusion  Past Medical and Surgical History:     Past Medical History:   Diagnosis Date    Adrenal insufficiency (Andrés's disease) ([facility] [address] )     Bipolar disorder     Cervical radiculopathy     Chronic back pain     [facility]    Fibromyalgia     History of TIAs     cannot remember details    Hypertension     Hypokalemia     Migraine     MRSA (methicillin resistant Staphylococcus aureus) 07/20/2012    nasal swab negative 4/5/19    Psychiatric disorder     Anxiety, major depression, bipolar    Spinal stenosis     Syncope 2014    orthostatic hypotension       Past Surgical History:   Procedure Laterality Date    APPENDECTOMY      GASTRIC RESTRICTION SURGERY      Gastric Sleeve Nov 2015    HYSTERECTOMY      JOINT REPLACEMENT      Left Knee 2011 and Right Hip 2010       Meds/Allergies:    Prior to Admission medications    Medication Sig Start Date End Date Taking?  Authorizing Provider   acetaminophen (TYLENOL) 500 mg tablet Take 1 tablet (500 mg total) by mouth every 6 (six) hours as needed (pain) 7/30/19  Yes Misha Saenz PA-C   apixaban (ELIQUIS) 5 mg Take 5 mg by mouth 2 (two) times a day   Yes Historical Provider, MD   ARIPiprazole (ABILIFY) 5 mg tablet Take 1 tablet by mouth daily  Patient taking differently: Take 15 mg by mouth daily   8/4/17  Yes Pierre Ortiz DO cholecalciferol (VITAMIN D3) 1,000 units tablet Take 5,000 Units by mouth daily   Yes Historical Provider, MD   eszopiclone (LUNESTA) 3 MG tablet Take 3 mg by mouth daily   Yes Historical Provider, MD   ferrous sulfate 325 (65 Fe) mg tablet Take 325 mg by mouth daily with breakfast   Yes Historical Provider, MD   fluvoxaMINE (LUVOX) 100 mg tablet Take 300 mg by mouth daily at bedtime     Yes Historical Provider, MD   gabapentin (NEURONTIN) 600 MG tablet Take 600 mg by mouth 3 (three) times a day  Yes Historical Provider, MD   haloperidol (HALDOL) 5 mg tablet Take one half tab orally twice a day as needed for headache or nausea  Max 2 tabs/day, 3 days/week 5/21/19  Yes Historical Provider, MD   lamoTRIgine (LaMICtal) 200 MG tablet Take 200 mg by mouth daily  Yes Historical Provider, MD   LORazepam (ATIVAN) 1 mg tablet Take 2 mg by mouth 3 (three) times a day     Yes Historical Provider, MD   mexiletine (MEXITIL) 150 mg capsule Take 150 mg by mouth 3 (three) times a day   Yes Historical Provider, MD   zonisamide (ZONEGRAN) 50 MG capsule Take 1 capsule by mouth daily  Patient taking differently: Take 200 mg by mouth daily  8/4/17  Yes Tila Tinoco DO   Erenumab-aooe (AIMOVIG) 140 MG/ML SOAJ Inject 140 mg under the skin every 30 (thirty) days  3/22/19   Historical Provider, MD   lidocaine (XYLOCAINE) 2 % topical gel Apply 1 application topically as needed for mild pain 7/30/19 10/2/19  Brandee Yañez PA-C     I have reviewed home medications with patient personally  Allergies:    Allergies   Allergen Reactions    Ketorolac Itching and Other (See Comments)     [toradol] Itching, hives    Mushroom Extract Complex        Social History:     Marital Status: /Civil Union   Occupation:  Unemployed  Patient Pre-hospital Living Situation:  Lives with   Patient Pre-hospital Level of Mobility:  Ambulatory  Patient Pre-hospital Diet Restrictions:  None  Substance Use History:   Social History Substance and Sexual Activity   Alcohol Use Not Currently    Frequency: Never    Binge frequency: Never     Social History     Tobacco Use   Smoking Status Former Smoker    Packs/day: 0 20    Types: Cigarettes    Last attempt to quit:     Years since quittin 7   Smokeless Tobacco Never Used     Social History     Substance and Sexual Activity   Drug Use Never       Family History:    Family History   Problem Relation Age of Onset   Chris Salazar Breast cancer Mother     Migraines Mother     Arthritis Mother     Kidney disease Father     Heart disease Father     Diabetes Father     Arthritis Father     Brain cancer Brother     Seizures Brother        Physical Exam:     Vitals:   Blood Pressure: 135/60 (10/02/19 1946)  Pulse: 68 (10/02/19 1939)  Temperature: (!) 97 °F (36 1 °C) (10/02/19 1939)  Temp Source: Temporal (10/02/19 1939)  Respirations: 19 (10/02/19 1939)  Height: 5' 8" (172 7 cm) (10/02/19 1939)  Weight - Scale: 121 kg (266 lb 15 6 oz) (10/02/19 1939)  SpO2: 97 % (10/02/19 1939)    Physical Exam   Constitutional: She is oriented to person, place, and time  Vital signs are normal  She appears well-developed  Appears comfortable, no acute distress  Texting on phone during admission  HENT:   Head: Normocephalic and atraumatic  Head non-tender to palpation  No signs of trauma  Eyes: Pupils are equal, round, and reactive to light  Conjunctivae and EOM are normal  No scleral icterus  Neck: Normal range of motion  Cardiovascular: Normal rate, regular rhythm and normal heart sounds  No murmur heard  Pulmonary/Chest: Effort normal and breath sounds normal  No respiratory distress  She has no wheezes  She has no rhonchi  She has no rales  Abdominal: Soft  Bowel sounds are normal  There is no tenderness  There is no rigidity, no rebound and no guarding  Obese abdomen   Musculoskeletal: She exhibits tenderness  She exhibits no edema or deformity     Able to move upper lower extremities bilaterally without difficulty  Minimal tenderness to palpation along the paraspinal muscles of the cervical spine  ROM WNL  No obvious deformities  Neurological: She is alert and oriented to person, place, and time  She has normal strength  No cranial nerve deficit or sensory deficit  Denies any dizziness currently during exam   Skin: Skin is warm and dry  Psychiatric: Her affect is blunt  Nursing note and vitals reviewed  Additional Data:     Lab Results: I have personally reviewed pertinent reports  Results from last 7 days   Lab Units 10/02/19  1510   WBC Thousand/uL 6 68   HEMOGLOBIN g/dL 12 3   HEMATOCRIT % 39 2   PLATELETS Thousands/uL 285   NEUTROS PCT % 54   LYMPHS PCT % 29   MONOS PCT % 13*   EOS PCT % 2     Results from last 7 days   Lab Units 10/02/19  1510   SODIUM mmol/L 145   POTASSIUM mmol/L 4 0   CHLORIDE mmol/L 108   CO2 mmol/L 29   BUN mg/dL 18   CREATININE mg/dL 0 82   ANION GAP mmol/L 8   CALCIUM mg/dL 8 9   GLUCOSE RANDOM mg/dL 98                       Imaging: I have personally reviewed pertinent reports  CT head without contrast   Final Result by María Hernandez MD (10/02 1613)      No acute intracranial abnormality  Workstation performed: [id number]         CT cervical spine without contrast   Final Result by María Hernandez MD (10/02 1617)      No cervical spine fracture or traumatic malalignment  Workstation performed: [id number]             EKG, Pathology, and Other Studies Reviewed on Admission:   · EKG:  NSR, QTC borderline at 475    Allscripts / Epic Records Reviewed: Yes     ** Please Note: This note has been constructed using a voice recognition system   **

## 2019-10-02 NOTE — ED PROVIDER NOTES
History  Chief Complaint   Patient presents with    Syncope     Pt syncope x 3 today, last one within one hour, reports LOC "for a few minutes" reports striking head on tile floor, taking eliquis BID  58 yo female with chronic adrenal insufficiency, migraine, h/o TIA, comes in for passing out at home  She is at home by herself, and says she has been experiencing onset of lightheadedness, followed by tunnel vision, and then passing out backwards  She feels she had LOC on the last one for "several minutes"  She c/o HA and neck pain since this last spell  She has had frequent similar issues with passing before for which she has been admitted, attributed to orthostatic hypotension, although no definitive diagnosis or treatment  She denies any new focal weakness, nausea or vomiting  History provided by:  Patient  Syncope   Episode history:  Multiple  Most recent episode: Today  Timing:  Intermittent  Chronicity:  Recurrent  Witnessed: no    Associated symptoms: dizziness and headaches    Associated symptoms: no chest pain, no nausea and no vomiting        Prior to Admission Medications   Prescriptions Last Dose Informant Patient Reported? Taking?    ARIPiprazole (ABILIFY) 5 mg tablet   No Yes   Sig: Take 1 tablet by mouth daily   Patient taking differently: Take 15 mg by mouth daily     Erenumab-aooe (AIMOVIG) 140 MG/ML SOAJ   Yes No   Sig: Inject 140 mg under the skin every 30 (thirty) days    LORazepam (ATIVAN) 1 mg tablet   Yes Yes   Sig: Take 2 mg by mouth 3 (three) times a day     acetaminophen (TYLENOL) 500 mg tablet   No Yes   Sig: Take 1 tablet (500 mg total) by mouth every 6 (six) hours as needed (pain)   apixaban (ELIQUIS) 5 mg   Yes Yes   Sig: Take 5 mg by mouth 2 (two) times a day   cholecalciferol (VITAMIN D3) 1,000 units tablet   Yes Yes   Sig: Take 5,000 Units by mouth daily   eszopiclone (LUNESTA) 3 MG tablet   Yes Yes   Sig: Take 3 mg by mouth daily   ferrous sulfate 325 (65 Fe) mg tablet   Yes Yes   Sig: Take 325 mg by mouth daily with breakfast   fluvoxaMINE (LUVOX) 100 mg tablet   Yes Yes   Sig: Take 300 mg by mouth daily at bedtime     gabapentin (NEURONTIN) 600 MG tablet   Yes Yes   Sig: Take 600 mg by mouth 3 (three) times a day    haloperidol (HALDOL) 5 mg tablet   Yes Yes   Sig: Take one half tab orally twice a day as needed for headache or nausea  Max 2 tabs/day, 3 days/week   lamoTRIgine (LaMICtal) 200 MG tablet   Yes Yes   Sig: Take 200 mg by mouth daily  mexiletine (MEXITIL) 150 mg capsule   Yes Yes   Sig: Take 150 mg by mouth 3 (three) times a day   zonisamide (ZONEGRAN) 50 MG capsule  Self No Yes   Sig: Take 1 capsule by mouth daily   Patient taking differently: Take 200 mg by mouth daily       Facility-Administered Medications: None       Past Medical History:   Diagnosis Date    Adrenal insufficiency (Andrés's disease) ([facility] [address] )     Bipolar disorder     Cervical radiculopathy     Chronic back pain     DVT, lower extremity ([facility] ) 1991    Fibromyalgia     History of TIAs     cannot remember details    Hypertension     Hypokalemia     Migraine     MRSA (methicillin resistant Staphylococcus aureus) 07/20/2012    nasal swab negative 4/5/19    Psychiatric disorder     Anxiety, major depression, bipolar    Spinal stenosis     Syncope 2014    orthostatic hypotension       Past Surgical History:   Procedure Laterality Date    APPENDECTOMY      GASTRIC RESTRICTION SURGERY      Gastric Sleeve Nov 2015    HYSTERECTOMY      JOINT REPLACEMENT      Left Knee 2011 and Right Hip 2010       Family History   Problem Relation Age of Onset    Breast cancer Mother     Migraines Mother     Arthritis Mother     Kidney disease Father     Heart disease Father     Diabetes Father     Arthritis Father     Brain cancer Brother     Seizures Brother      I have reviewed and agree with the history as documented      Social History     Tobacco Use    Smoking status: Former Smoker Packs/day: 0 20     Types: Cigarettes     Last attempt to quit:      Years since quittin 7    Smokeless tobacco: Never Used   Substance Use Topics    Alcohol use: Not Currently     Frequency: Never     Binge frequency: Never    Drug use: Never        Review of Systems   Constitutional: Positive for fatigue  Cardiovascular: Positive for syncope  Negative for chest pain  Gastrointestinal: Negative for nausea and vomiting  Musculoskeletal: Positive for neck pain  Neurological: Positive for dizziness and headaches  All other systems reviewed and are negative  Physical Exam  Physical Exam   Constitutional: She is oriented to person, place, and time  Vital signs are normal  She appears well-developed and well-nourished  Non-toxic appearance  HENT:   Head: Normocephalic and atraumatic  Right Ear: Tympanic membrane and external ear normal    Left Ear: Tympanic membrane and external ear normal    Nose: Nose normal    Mouth/Throat: Oropharynx is clear and moist    Eyes: Pupils are equal, round, and reactive to light  Conjunctivae and EOM are normal    Neck: Normal range of motion and full passive range of motion without pain  Neck supple  No Brudzinski's sign and no Kernig's sign noted  Cardiovascular: Normal rate, regular rhythm, normal heart sounds, intact distal pulses and normal pulses  No murmur heard  Pulmonary/Chest: Effort normal and breath sounds normal  No tachypnea  No respiratory distress  She has no wheezes  Abdominal: Soft  Bowel sounds are normal  She exhibits no distension  There is no tenderness  There is no rigidity, no rebound and no guarding  Musculoskeletal: Normal range of motion  Right lower leg: She exhibits no swelling  Left lower leg: She exhibits no swelling  Lymphadenopathy:     She has no cervical adenopathy  Neurological: She is alert and oriented to person, place, and time  She has normal strength and normal reflexes   No cranial nerve deficit or sensory deficit  Coordination and gait normal  GCS eye subscore is 4  GCS verbal subscore is 5  GCS motor subscore is 6  Skin: Skin is warm and dry  Capillary refill takes less than 2 seconds  No rash noted  She is not diaphoretic  No pallor  Psychiatric: She has a normal mood and affect  Her speech is normal and behavior is normal  Judgment and thought content normal  Cognition and memory are normal    Nursing note and vitals reviewed        Vital Signs  ED Triage Vitals   Temperature Pulse Respirations Blood Pressure SpO2   10/02/19 1424 10/02/19 1424 10/02/19 1424 10/02/19 1424 10/02/19 1424   98 1 °F (36 7 °C) 77 18 155/81 99 %      Temp Source Heart Rate Source Patient Position - Orthostatic VS BP Location FiO2 (%)   10/02/19 1424 10/02/19 1424 10/02/19 1450 10/02/19 1450 --   Temporal Monitor Sitting Left arm       Pain Score       10/02/19 1424       Worst Possible Pain           Vitals:    10/03/19 0755 10/03/19 0758 10/03/19 0801 10/03/19 1121   BP: 137/78 147/79 142/87 148/78   Pulse: 67 70 81 74   Patient Position - Orthostatic VS: Lying Sitting Standing Sitting         Visual Acuity  Visual Acuity      Most Recent Value   L Pupil Size (mm)  3   R Pupil Size (mm)  3   L Pupil Shape  Round   R Pupil Shape  Round          ED Medications  Medications   apixaban (ELIQUIS) tablet 5 mg (5 mg Oral Given 10/3/19 0836)   ferrous sulfate tablet 325 mg (325 mg Oral Given 10/3/19 0835)   gabapentin (NEURONTIN) capsule 600 mg (600 mg Oral Given 10/3/19 0835)   lamoTRIgine (LaMICtal) tablet 200 mg (200 mg Oral Given 10/3/19 0836)   LORazepam (ATIVAN) tablet 2 mg (2 mg Oral Given 10/3/19 0835)   mexiletine (MEXITIL) capsule 150 mg (150 mg Oral Given 10/3/19 0838)   zonisamide (ZONEGRAN) capsule 200 mg (200 mg Oral Given 10/3/19 0838)   sodium chloride 0 9 % infusion (75 mL/hr Intravenous New Bag 10/3/19 0841)   ondansetron (ZOFRAN) injection 4 mg (has no administration in time range)   acetaminophen (TYLENOL) tablet 650 mg (650 mg Oral Given 10/3/19 1209)   meclizine (ANTIVERT) tablet 12 5 mg (12 5 mg Oral Given 10/3/19 0543)   ARIPiprazole (ABILIFY) tablet 5 mg (5 mg Oral Given 10/3/19 0839)   fluvoxaMINE (LUVOX) tablet 100 mg (100 mg Oral Given 10/2/19 2110)   zolpidem (AMBIEN) tablet 5 mg (5 mg Oral Given 10/2/19 2110)   lidocaine (LIDODERM) 5 % patch 1 patch (1 patch Topical Medication Applied 10/3/19 0836)   acetaminophen (TYLENOL) tablet 650 mg (650 mg Oral Given 10/2/19 1459)   ketorolac (TORADOL) injection 15 mg (15 mg Intravenous Given 10/2/19 1722)   diphenhydrAMINE (BENADRYL) injection 25 mg (25 mg Intravenous Given 10/2/19 1647)   diazepam (VALIUM) injection 2 5 mg (2 5 mg Intravenous Given 10/2/19 1751)   sodium chloride 0 9 % bolus 500 mL (0 mL Intravenous Stopped 10/2/19 1736)   influenza vaccine, recombinant, quadrivalent (FLUBLOK) IM injection 0 5 mL (0 5 mL Intramuscular Given 10/3/19 1206)   diphenhydrAMINE (BENADRYL) injection 25 mg (25 mg Intravenous Given 10/3/19 0059)   ketorolac (TORADOL) injection 15 mg (15 mg Intravenous Given 10/3/19 0101)   acetaminophen (TYLENOL) tablet 975 mg (975 mg Oral Given 10/3/19 0609)       Diagnostic Studies  Results Reviewed     Procedure Component Value Units Date/Time    Magnesium [[id number]]  (Normal) Collected:  10/02/19 1510    Lab Status:  Final result Specimen:  Blood from Arm, Right Updated:  10/02/19 2052     Magnesium 2 4 mg/dL     Basic metabolic panel [[id number]] Collected:  10/02/19 1510    Lab Status:  Final result Specimen:  Blood from Arm, Right Updated:  10/02/19 1529     Sodium 145 mmol/L      Potassium 4 0 mmol/L      Chloride 108 mmol/L      CO2 29 mmol/L      ANION GAP 8 mmol/L      BUN 18 mg/dL      Creatinine 0 82 mg/dL      Glucose 98 mg/dL      Calcium 8 9 mg/dL      eGFR 78 ml/min/1 73sq m     Narrative:       [place] guidelines for Chronic Kidney Disease (CKD):     Stage 1 with normal or high GFR (GFR > 90 mL/min/1 73 square meters)    Stage 2 Mild CKD (GFR = 60-89 mL/min/1 73 square meters)    Stage 3A Moderate CKD (GFR = 45-59 mL/min/1 73 square meters)    Stage 3B Moderate CKD (GFR = 30-44 mL/min/1 73 square meters)    Stage 4 Severe CKD (GFR = 15-29 mL/min/1 73 square meters)    Stage 5 End Stage CKD (GFR <15 mL/min/1 73 square meters)  Note: GFR calculation is accurate only with a steady state creatinine    CBC and differential [[id number]]  (Abnormal) Collected:  10/02/19 1510    Lab Status:  Final result Specimen:  Blood from Arm, Right Updated:  10/02/19 1515     WBC 6 68 Thousand/uL      RBC 4 12 Million/uL      Hemoglobin 12 3 g/dL      Hematocrit 39 2 %      MCV 95 fL      MCH 29 9 pg      MCHC 31 4 g/dL      RDW 17 9 %      MPV 9 5 fL      Platelets 034 Thousands/uL      nRBC 0 /100 WBCs      Neutrophils Relative 54 %      Immat GRANS % 1 %      Lymphocytes Relative 29 %      Monocytes Relative 13 %      Eosinophils Relative 2 %      Basophils Relative 1 %      Neutrophils Absolute 3 65 Thousands/µL      Immature Grans Absolute 0 05 Thousand/uL      Lymphocytes Absolute 1 93 Thousands/µL      Monocytes Absolute 0 85 Thousand/µL      Eosinophils Absolute 0 16 Thousand/µL      Basophils Absolute 0 04 Thousands/µL                  CT head without contrast   Final Result by Doug Anderson MD (10/02 1613)      No acute intracranial abnormality  Workstation performed: [id number]         CT cervical spine without contrast   Final Result by Doug Anderson MD (10/02 1617)      No cervical spine fracture or traumatic malalignment                     Workstation performed: [id number]                    Procedures  ECG 12 Lead Documentation Only  Date/Time: 10/2/2019 2:40 PM  Performed by: Amanda Olguin MD  Authorized by: Amanda Olguin MD     Rate:     ECG rate:  74  Rhythm:     Rhythm: sinus rhythm    Ectopy:     Ectopy: none    QRS:     QRS axis:  Normal    QRS intervals: Normal  Conduction:     Conduction: normal    ST segments:     ST segments:  Normal  T waves:     T waves: normal             ED Course  ED Course as of Oct 03 1230   Wed Oct 02, 2019   1627  She continues to ask me for analgesic for neck pain, but no distress and CT is negative,but she also refuses to ambulate because she is dizzy  I would not consider opiates indicated for undifferentiated acute musculoskeletal pain, especialy when the presenting issue is syncope and dizziness, but rather NSAIDs, but she was quick to bring up allergy to toradol  Her PDMP shows frequent Rx for opiates from different providers and chronic benzodiazepines  So I will address this issue with her      1638 She actually says ketorolac can be given with diphenhydramine and she has not issues, so I comfortable with this  Her most prominent is that she feels dizzy like vertigo-so I do feel a low dose of diazepam may be helpful if she is still experiencing discomfort after analgesia and IVF  1816 Patient continues to say she feels dizzy, and "tunneling" while lying in no distress, no nystagmus, with head movement  I have advised her we need to go ahead and do some ambulatory assessement  Kacey Nolan She would not ambulate more than a few feet, c/o feeling too dizzy, like she was going to fall, although VS remained stable and functionally she completed the short distance  Still she c/o continuous symptoms, and I do not consider her reliable for discharge at this point                                      MDM    Disposition  Final diagnoses:   Syncope   Vertigo   Minor head injury, initial encounter   Strain of neck muscle, initial encounter     Time reflects when diagnosis was documented in both MDM as applicable and the Disposition within this note     Time User Action Codes Description Comment    10/2/2019  6:48 PM Collin Rand Add [R55] Syncope     10/2/2019  6:48 PM Collin Rand Add [R42] Vertigo     10/2/2019  6:48 PM Alroy Reasons Add [J92 [id number]] Minor head injury, initial encounter     10/2/2019  6:48 PM Alroy Reasons Add [S16  1XXA] Strain of neck muscle, initial encounter       ED Disposition     ED Disposition Condition Date/Time Comment    Admit Good Wed Oct 2, 2019  7:00 PM Case was discussed with Dr Inna Rizzo and the patient's admission status was agreed to be Admission Status: inpatient status to the service of Dr Inna Rizzo   Follow-up Information    None         Current Discharge Medication List      CONTINUE these medications which have NOT CHANGED    Details   acetaminophen (TYLENOL) 500 mg tablet Take 1 tablet (500 mg total) by mouth every 6 (six) hours as needed (pain)  Qty: 30 tablet, Refills: 0    Associated Diagnoses: Fall, initial encounter; Injury of head, initial encounter      apixaban (ELIQUIS) 5 mg Take 5 mg by mouth 2 (two) times a day      ARIPiprazole (ABILIFY) 5 mg tablet Take 1 tablet by mouth daily  Qty: 30 tablet, Refills: 3      cholecalciferol (VITAMIN D3) 1,000 units tablet Take 5,000 Units by mouth daily      eszopiclone (LUNESTA) 3 MG tablet Take 3 mg by mouth daily      ferrous sulfate 325 (65 Fe) mg tablet Take 325 mg by mouth daily with breakfast      fluvoxaMINE (LUVOX) 100 mg tablet Take 300 mg by mouth daily at bedtime        gabapentin (NEURONTIN) 600 MG tablet Take 600 mg by mouth 3 (three) times a day       haloperidol (HALDOL) 5 mg tablet Take one half tab orally twice a day as needed for headache or nausea  Max 2 tabs/day, 3 days/week      lamoTRIgine (LaMICtal) 200 MG tablet Take 200 mg by mouth daily        LORazepam (ATIVAN) 1 mg tablet Take 2 mg by mouth 3 (three) times a day        mexiletine (MEXITIL) 150 mg capsule Take 150 mg by mouth 3 (three) times a day      zonisamide (ZONEGRAN) 50 MG capsule Take 1 capsule by mouth daily  Qty: 30 capsule, Refills: 3      Erenumab-aooe (AIMOVIG) 140 MG/ML SOAJ Inject 140 mg under the skin every 30 (thirty) days            No discharge procedures on file      ED Provider  Electronically Signed by           Pradeep Patino MD  10/03/19 1887

## 2019-10-02 NOTE — ASSESSMENT & PLAN NOTE
· On several psychiatric medications as an outpatient  · Currently on Abilify, Luvox, ativan, lamictal  · Appears to have some inconsistency regarding doses    Patient reporting higher doses than those reported in the system/re-ordered during recent hospitalizations

## 2019-10-02 NOTE — ASSESSMENT & PLAN NOTE
· Presents to the emergency department with one day history of recurrent dizziness - appears peripheral in nature  History of recurrent syncope with otherwise unremarkable work up in the past   · Vital signs stable at time of admission  · CT head: "no acute intracranial abnormality"  · CT cervical: "No cervical spine fracture or traumatic malalignment "  · EKG: NSR, QTc borderline at 475  · Echo July 2019: EF 18% grade 1 diastolic dysfunction  · VAS carotid January 2018: bilateral carotids < 50% stenosis  · CBC and BMP WNL  Check magnesium  · Trial meclizine q 8 hours  Check orthostatic BP q shift    Continue gentle IVF  · Consider polypharmacy as patient is on multiple psychiatric medications and appears to have some inconsistency of what doses she is taking  · PT/OT consult

## 2019-10-02 NOTE — ED NOTES
Pt reports no improvement with pain or dizziness;  Pt states she does not think she will be able to ambulate; Dr Nida López notified     Concha Celestin RN  10/02/19 [phone]

## 2019-10-02 NOTE — ED NOTES
Pt unable to ambulate d/t dizziness; Dr Corinne Foreman notified     Lisa Tobias, LELE  10/02/19 2199

## 2019-10-03 PROBLEM — G43.019 INTRACTABLE MIGRAINE WITHOUT AURA AND WITHOUT STATUS MIGRAINOSUS: Status: ACTIVE | Noted: 2019-10-02

## 2019-10-03 PROBLEM — Z79.01 CHRONIC ANTICOAGULATION: Status: ACTIVE | Noted: 2019-04-10

## 2019-10-03 LAB
ANION GAP SERPL CALCULATED.3IONS-SCNC: 9 MMOL/L (ref 4–13)
BASOPHILS # BLD AUTO: 0.05 THOUSANDS/ΜL (ref 0–0.1)
BASOPHILS NFR BLD AUTO: 1 % (ref 0–1)
BUN SERPL-MCNC: 15 MG/DL (ref 5–25)
CALCIUM SERPL-MCNC: 8.8 MG/DL (ref 8.3–10.1)
CHLORIDE SERPL-SCNC: 111 MMOL/L (ref 100–108)
CO2 SERPL-SCNC: 25 MMOL/L (ref 21–32)
CREAT SERPL-MCNC: 0.73 MG/DL (ref 0.6–1.3)
EOSINOPHIL # BLD AUTO: 0.15 THOUSAND/ΜL (ref 0–0.61)
EOSINOPHIL NFR BLD AUTO: 2 % (ref 0–6)
ERYTHROCYTE [DISTWIDTH] IN BLOOD BY AUTOMATED COUNT: 17.9 % (ref 11.6–15.1)
GFR SERPL CREATININE-BSD FRML MDRD: 90 ML/MIN/1.73SQ M
GLUCOSE P FAST SERPL-MCNC: 86 MG/DL (ref 65–99)
GLUCOSE SERPL-MCNC: 86 MG/DL (ref 65–140)
GLUCOSE SERPL-MCNC: 90 MG/DL (ref 65–140)
HCT VFR BLD AUTO: 36.1 % (ref 34.8–46.1)
HGB BLD-MCNC: 11.4 G/DL (ref 11.5–15.4)
IMM GRANULOCYTES # BLD AUTO: 0.03 THOUSAND/UL (ref 0–0.2)
IMM GRANULOCYTES NFR BLD AUTO: 1 % (ref 0–2)
LYMPHOCYTES # BLD AUTO: 1.87 THOUSANDS/ΜL (ref 0.6–4.47)
LYMPHOCYTES NFR BLD AUTO: 29 % (ref 14–44)
MCH RBC QN AUTO: 30.2 PG (ref 26.8–34.3)
MCHC RBC AUTO-ENTMCNC: 31.6 G/DL (ref 31.4–37.4)
MCV RBC AUTO: 96 FL (ref 82–98)
MONOCYTES # BLD AUTO: 0.68 THOUSAND/ΜL (ref 0.17–1.22)
MONOCYTES NFR BLD AUTO: 11 % (ref 4–12)
NEUTROPHILS # BLD AUTO: 3.6 THOUSANDS/ΜL (ref 1.85–7.62)
NEUTS SEG NFR BLD AUTO: 56 % (ref 43–75)
NRBC BLD AUTO-RTO: 0 /100 WBCS
PLATELET # BLD AUTO: 269 THOUSANDS/UL (ref 149–390)
PMV BLD AUTO: 9.2 FL (ref 8.9–12.7)
POTASSIUM SERPL-SCNC: 3.6 MMOL/L (ref 3.5–5.3)
RBC # BLD AUTO: 3.77 MILLION/UL (ref 3.81–5.12)
SODIUM SERPL-SCNC: 145 MMOL/L (ref 136–145)
TSH SERPL DL<=0.05 MIU/L-ACNC: 1.88 UIU/ML (ref 0.36–3.74)
WBC # BLD AUTO: 6.38 THOUSAND/UL (ref 4.31–10.16)

## 2019-10-03 PROCEDURE — 85025 COMPLETE CBC W/AUTO DIFF WBC: CPT | Performed by: PHYSICIAN ASSISTANT

## 2019-10-03 PROCEDURE — 99225 PR SBSQ OBSERVATION CARE/DAY 25 MINUTES: CPT | Performed by: INTERNAL MEDICINE

## 2019-10-03 PROCEDURE — 80175 DRUG SCREEN QUAN LAMOTRIGINE: CPT | Performed by: PHYSICIAN ASSISTANT

## 2019-10-03 PROCEDURE — 90682 RIV4 VACC RECOMBINANT DNA IM: CPT | Performed by: INTERNAL MEDICINE

## 2019-10-03 PROCEDURE — 82948 REAGENT STRIP/BLOOD GLUCOSE: CPT

## 2019-10-03 PROCEDURE — 80048 BASIC METABOLIC PNL TOTAL CA: CPT | Performed by: PHYSICIAN ASSISTANT

## 2019-10-03 PROCEDURE — 84443 ASSAY THYROID STIM HORMONE: CPT | Performed by: PHYSICIAN ASSISTANT

## 2019-10-03 RX ORDER — METOCLOPRAMIDE HYDROCHLORIDE 5 MG/ML
10 INJECTION INTRAMUSCULAR; INTRAVENOUS ONCE
Status: DISCONTINUED | OUTPATIENT
Start: 2019-10-03 | End: 2019-10-03

## 2019-10-03 RX ORDER — KETOROLAC TROMETHAMINE 30 MG/ML
15 INJECTION, SOLUTION INTRAMUSCULAR; INTRAVENOUS ONCE
Status: COMPLETED | OUTPATIENT
Start: 2019-10-03 | End: 2019-10-03

## 2019-10-03 RX ORDER — MAGNESIUM SULFATE HEPTAHYDRATE 40 MG/ML
2 INJECTION, SOLUTION INTRAVENOUS
Status: DISCONTINUED | OUTPATIENT
Start: 2019-10-04 | End: 2019-10-03

## 2019-10-03 RX ORDER — MAGNESIUM SULFATE HEPTAHYDRATE 40 MG/ML
2 INJECTION, SOLUTION INTRAVENOUS ONCE
Status: COMPLETED | OUTPATIENT
Start: 2019-10-03 | End: 2019-10-03

## 2019-10-03 RX ORDER — PANTOPRAZOLE SODIUM 40 MG/1
40 TABLET, DELAYED RELEASE ORAL
Status: DISCONTINUED | OUTPATIENT
Start: 2019-10-03 | End: 2019-10-07 | Stop reason: HOSPADM

## 2019-10-03 RX ORDER — ACETAMINOPHEN 325 MG/1
975 TABLET ORAL ONCE
Status: COMPLETED | OUTPATIENT
Start: 2019-10-03 | End: 2019-10-03

## 2019-10-03 RX ORDER — DIPHENHYDRAMINE HYDROCHLORIDE 50 MG/ML
25 INJECTION INTRAMUSCULAR; INTRAVENOUS EVERY 8 HOURS PRN
Status: DISCONTINUED | OUTPATIENT
Start: 2019-10-03 | End: 2019-10-06

## 2019-10-03 RX ORDER — DIPHENHYDRAMINE HYDROCHLORIDE 50 MG/ML
25 INJECTION INTRAMUSCULAR; INTRAVENOUS ONCE
Status: COMPLETED | OUTPATIENT
Start: 2019-10-03 | End: 2019-10-03

## 2019-10-03 RX ORDER — KETOROLAC TROMETHAMINE 30 MG/ML
30 INJECTION, SOLUTION INTRAMUSCULAR; INTRAVENOUS EVERY 12 HOURS SCHEDULED
Status: DISCONTINUED | OUTPATIENT
Start: 2019-10-03 | End: 2019-10-05

## 2019-10-03 RX ADMIN — MECLIZINE 12.5 MG: 12.5 TABLET ORAL at 21:36

## 2019-10-03 RX ADMIN — SODIUM CHLORIDE 75 ML/HR: 0.9 INJECTION, SOLUTION INTRAVENOUS at 08:41

## 2019-10-03 RX ADMIN — KETOROLAC TROMETHAMINE 30 MG: 30 INJECTION, SOLUTION INTRAMUSCULAR at 21:38

## 2019-10-03 RX ADMIN — KETOROLAC TROMETHAMINE 15 MG: 30 INJECTION, SOLUTION INTRAMUSCULAR at 01:01

## 2019-10-03 RX ADMIN — MECLIZINE 12.5 MG: 12.5 TABLET ORAL at 05:43

## 2019-10-03 RX ADMIN — MECLIZINE 12.5 MG: 12.5 TABLET ORAL at 14:18

## 2019-10-03 RX ADMIN — FERROUS SULFATE TAB 325 MG (65 MG ELEMENTAL FE) 325 MG: 325 (65 FE) TAB at 08:35

## 2019-10-03 RX ADMIN — LORAZEPAM 2 MG: 1 TABLET ORAL at 08:35

## 2019-10-03 RX ADMIN — INFLUENZA A VIRUS A/BRISBANE/02/2018 (H1N1) RECOMBINANT HEMAGGLUTININ ANTIGEN, INFLUENZA A VIRUS A/KANSAS/14/2017 (H3N2) RECOMBINANT HEMAGGLUTININ ANTIGEN, INFLUENZA B VIRUS B/PHUKET/3073/2013 RECOMBINANT HEMAGGLUTININ ANTIGEN, AND INFLUENZA B VIRUS B/MARYLAND/15/2016 RECOMBINANT HEMAGGLUTININ ANTIGEN 0.5 ML: 45; 45; 45; 45 INJECTION INTRAMUSCULAR at 12:06

## 2019-10-03 RX ADMIN — ACETAMINOPHEN 975 MG: 325 TABLET ORAL at 06:09

## 2019-10-03 RX ADMIN — ARIPIPRAZOLE 5 MG: 5 TABLET ORAL at 08:39

## 2019-10-03 RX ADMIN — LORAZEPAM 2 MG: 1 TABLET ORAL at 16:00

## 2019-10-03 RX ADMIN — FLUVOXAMINE MALEATE 100 MG: 50 TABLET, FILM COATED ORAL at 21:37

## 2019-10-03 RX ADMIN — DIPHENHYDRAMINE HYDROCHLORIDE 25 MG: 50 INJECTION, SOLUTION INTRAMUSCULAR; INTRAVENOUS at 00:59

## 2019-10-03 RX ADMIN — GABAPENTIN 600 MG: 300 CAPSULE ORAL at 08:35

## 2019-10-03 RX ADMIN — DIPHENHYDRAMINE HYDROCHLORIDE 25 MG: 50 INJECTION, SOLUTION INTRAMUSCULAR; INTRAVENOUS at 21:46

## 2019-10-03 RX ADMIN — LORAZEPAM 2 MG: 1 TABLET ORAL at 21:36

## 2019-10-03 RX ADMIN — GABAPENTIN 600 MG: 300 CAPSULE ORAL at 16:00

## 2019-10-03 RX ADMIN — ZONISAMIDE 200 MG: 100 CAPSULE ORAL at 08:38

## 2019-10-03 RX ADMIN — APIXABAN 5 MG: 5 TABLET, FILM COATED ORAL at 16:00

## 2019-10-03 RX ADMIN — GABAPENTIN 600 MG: 300 CAPSULE ORAL at 21:36

## 2019-10-03 RX ADMIN — LAMOTRIGINE 200 MG: 100 TABLET ORAL at 08:36

## 2019-10-03 RX ADMIN — MEXILETINE HYDROCHLORIDE 150 MG: 150 CAPSULE ORAL at 08:38

## 2019-10-03 RX ADMIN — ACETAMINOPHEN 650 MG: 325 TABLET ORAL at 12:09

## 2019-10-03 RX ADMIN — MEXILETINE HYDROCHLORIDE 150 MG: 150 CAPSULE ORAL at 15:59

## 2019-10-03 RX ADMIN — MAGNESIUM SULFATE HEPTAHYDRATE 2 G: 40 INJECTION, SOLUTION INTRAVENOUS at 18:17

## 2019-10-03 RX ADMIN — LIDOCAINE 1 PATCH: 50 PATCH TOPICAL at 08:36

## 2019-10-03 RX ADMIN — APIXABAN 5 MG: 5 TABLET, FILM COATED ORAL at 08:36

## 2019-10-03 RX ADMIN — MEXILETINE HYDROCHLORIDE 150 MG: 150 CAPSULE ORAL at 21:38

## 2019-10-03 NOTE — ASSESSMENT & PLAN NOTE
· On several psychiatric medications as an outpatient  · Continue psychiatric medications with caution

## 2019-10-03 NOTE — NURSING NOTE
Upon Q4 neuro check assessment pt reported numbness on right side of face, arm and leg  SLIM notified and cam to assess the pt  New orders for migraine cocktail- Benadryl and Toradol given  Call bell within reach, will continue to monitor

## 2019-10-03 NOTE — ASSESSMENT & PLAN NOTE
· Migrainous versus polypharmacy   · Treat migraine headache and observe for improvement of dizziness  · She requires multiple potentially sedating medication for her neuropathic pain/bipolar disorder/migraine  · Patient requires another midnight stay

## 2019-10-03 NOTE — UTILIZATION REVIEW
Initial Clinical Review    Admission: Date/Time/Statement: 10/02/2019 @ [address]  This Encounter   Procedures    Place in Observation     Standing Status:   Standing     Number of Occurrences:   1     Order Specific Question:   Admitting Physician     Answer:   Gael Coleman     Order Specific Question:   Level of Care     Answer:   Med Surg [16]     ED Arrival Information     Expected Arrival Acuity Means of Arrival Escorted By Service Admission Type    - 10/2/2019 14:19 Emergent Wheelchair Family Member Hospitalist Emergency    Arrival Complaint    Syncope Episodes        Chief Complaint   Patient presents with    Syncope     Pt syncope x 3 today, last one within one hour, reports LOC "for a few minutes" reports striking head on tile floor, taking eliquis BID  Assessment/Plan: 59year old female, presented to the ED from home via car  Admitted as Observation due to syncope  Suddenly collapsing 3 times today  Postural dizziness:  Presents to the emergency department with one day history of recurrent dizziness - appears peripheral in nature  History of recurrent syncope with otherwise unremarkable work up in the past   CT head: "no acute intracranial abnormality"  CT cervical: "No cervical spine fracture or traumatic malalignment "  EKG: NSR, QTc borderline at 475  Echo July 2019: EF 90% grade 1 diastolic dysfunction  VAS carotid January 2018: bilateral carotids < 50% stenosis  CBC and BMP WNL  Check magnesium  Trial meclizine q 8 hours  Check orthostatic BP q shift  Continue gentle IVF  Consider polypharmacy as patient is on multiple psychiatric medications and appears to have some inconsistency of what doses she is taking  PT/OT consult  10/03/2019  Intractable migraine without aura and without status migrainosus:  On Aimovig and Botox injections outpatient  Continue Zonegran  Start Toradol with Protonix for GI protection  Give magnesium sulfate 2 g IV x1    Consult Neurology  Avoid narcotics        ED Triage Vitals   Temperature Pulse Respirations Blood Pressure SpO2   10/02/19 1424 10/02/19 1424 10/02/19 1424 10/02/19 1424 10/02/19 1424   98 1 °F (36 7 °C) 77 18 155/81 99 %      Temp Source Heart Rate Source Patient Position - Orthostatic VS BP Location FiO2 (%)   10/02/19 1424 10/02/19 1424 10/02/19 1450 10/02/19 1450 --   Temporal Monitor Sitting Left arm       Pain Score       10/02/19 1424       Worst Possible Pain        Wt Readings from Last 1 Encounters:   10/02/19 121 kg (266 lb 15 6 oz)     Additional Vital Signs:   Date/Time  Temp  Pulse  Resp  BP  SpO2  O2 Device  Patient Position - Orthostatic VS   10/03/19 0801    81    142/87      Standing   10/03/19 0758    70    147/79      Sitting   10/03/19 0755  97 3 °F (36 3 °C)Abnormal   67  18  137/78  98 %  None (Room air)  Lying   10/03/19 0730            None (Room air)     10/02/19 2326  97 4 °F (36 3 °C)Abnormal   75  19  160/85  94 %  None (Room air)  Lying   10/02/19 1946        135/60      Standing - Orthostatic VS   10/02/19 1944        129/59      Sitting - Orthostatic VS   10/02/19 1939  97 °F (36 1 °C)Abnormal   68  19  136/61  97 %  None (Room air)  Lying - Orthostatic VS   10/02/19 1835    76  20  148/64  96 %  None (Room air)  Standing   10/02/19 1834    70  18  142/66  97 %  None (Room air)  Sitting   10/02/19 1833    68  16  150/74  97 %  None (Room air)  Lying   10/02/19 1756    60  18  138/62  98 %  None (Room air)     10/02/19 1602    66  18  142/63  94 %  None (Room air)  Sitting   10/02/19 1450    79    145/68  95 %  None (Room air)  Sitting   10/02/19 1424  98 1 °F (36 7 °C)  77  18  155/81  99 %  None (Room air)       Date and Time Eye Opening Best Verbal Response Best Motor Response Rajni Coma Scale Score   10/03/19 0730 4 5 6 15   10/03/19 0415 4 5 6 15   10/03/19 0015 4 5 6 15   10/02/19 2015 4 5 6 15   10/02/19 1449 4 5 6 15         Pertinent Labs/Diagnostic Test Results:   10/02/2019 @ 1611  CT head:  No acute intracranial abnormality  10/02/2019 @ 1613  CT C Spine:  No cervical spine fracture or traumatic malalignment      10/02/2019 @ 1438  EC, NSR, Minimal voltage criteria for LVH, may be normal variant    Results from last 7 days   Lab Units 10/03/19  0542 10/02/19  1510   WBC Thousand/uL 6 38 6 68   HEMOGLOBIN g/dL 11 4* 12 3   HEMATOCRIT % 36 1 39 2   PLATELETS Thousands/uL 269 285   NEUTROS ABS Thousands/µL 3 60 3 65     Results from last 7 days   Lab Units 10/03/19  0542 10/02/19  1510   SODIUM mmol/L 145 145   POTASSIUM mmol/L 3 6 4 0   CHLORIDE mmol/L 111* 108   CO2 mmol/L 25 29   ANION GAP mmol/L 9 8   BUN mg/dL 15 18   CREATININE mg/dL 0 73 0 82   EGFR ml/min/1 73sq m 90 78   CALCIUM mg/dL 8 8 8 9   MAGNESIUM mg/dL  --  2 4     Results from last 7 days   Lab Units 10/03/19  0542 10/02/19  1510   GLUCOSE RANDOM mg/dL 86 98     Results from last 7 days   Lab Units 10/03/19  0542   TSH 3RD GENERATON uIU/mL 1 884     ED Treatment:   Medication Administration from 10/02/2019 1419 to 10/02/2019 1924       Date/Time Order Dose Route Action     10/02/2019 1459 acetaminophen (TYLENOL) tablet 650 mg 650 mg Oral Given     10/02/2019 1722 ketorolac (TORADOL) injection 15 mg 15 mg Intravenous Given     10/02/2019 1647 diphenhydrAMINE (BENADRYL) injection 25 mg 25 mg Intravenous Given     10/02/2019 1751 diazepam (VALIUM) injection 2 5 mg 2 5 mg Intravenous Given     10/02/2019 1657 sodium chloride 0 9 % bolus 500 mL   Intravenous Restarted     10/02/2019 1650 sodium chloride 0 9 % bolus 500 mL 0 mL Intravenous Hold     10/02/2019 1646 sodium chloride 0 9 % bolus 500 mL 500 mL Intravenous New Bag        Past Medical History:   Diagnosis Date    Adrenal insufficiency (Andrés's disease) ([facility] [address] )     Bipolar disorder     Cervical radiculopathy     Chronic back pain     DVT, lower extremity ([facility] [address] [facility]     Fibromyalgia     History of TIAs     cannot remember details    Hypertension     Hypokalemia     Migraine     MRSA (methicillin resistant Staphylococcus aureus) 07/20/2012    nasal swab negative 4/5/19    Psychiatric disorder     Anxiety, major depression, bipolar    Spinal stenosis     Syncope 2014    orthostatic hypotension     Present on Admission:   Postural dizziness   Bipolar disorder   Spinal stenosis of lumbar region      Admitting Diagnosis: Syncope [R55]  Vertigo [R42]  Minor head injury, initial encounter [S09 90XA]  Strain of neck muscle, initial encounter [S16  1XXA]  Age/Sex: 59 y o  female  Admission Orders:  Current Facility-Administered Medications:  acetaminophen 650 mg Oral Q6H PRN x 1 dose   apixaban 5 mg Oral BID   ARIPiprazole 5 mg Oral Daily   ferrous sulfate 325 mg Oral Daily With Breakfast   fluvoxaMINE 100 mg Oral HS   gabapentin 600 mg Oral TID   influenza vaccine 0 5 mL Intramuscular Prior to discharge   lamoTRIgine 200 mg Oral Daily   lidocaine 1 patch Topical Daily   LORazepam 2 mg Oral TID   meclizine 12 5 mg Oral Q8H [facility]   mexiletine 150 mg Oral TID   ondansetron 4 mg Intravenous Q6H PRN   sodium chloride 75 mL/hr Intravenous Continuous   zolpidem 5 mg Oral HS PRN   zonisamide 200 mg Oral Daily   diphenhydrAMINE (BENADRYL) injection 25 mg   Dose: 25 mg  Freq: Every 8 hours PRN Route: IV;  X 1 dose  PRN Reason: itching  PRN Comment: restlessness  Start: 10/03/19 1735 End: 10/06/19 1734  TELM  Neuro checks q4h      Network Utilization Review Department  Phone: [phone]; Fax [phone]  [email or web address]  ATTENTION: Please call with any questions or concerns to [phone]  and carefully listen to the prompts so that you are directed to the right person  Send all requests for admission clinical reviews, approved or denied determinations and any other requests to fax [phone]   All voicemails are confidential

## 2019-10-03 NOTE — PROGRESS NOTES
PT requested diet education on wt loss tips  Provided PT with weight loss tips handout and verbally went over helpful tips to help with wt loss  Recommended to PT to not undereat but also to not over eat, and healthy cooking tips to help with wt loss  Provided PT with outpatient MNT brochure for further questions outside hospital stay  Will continue to follow PT for any further questions during hospital stay

## 2019-10-03 NOTE — PLAN OF CARE
Problem: Potential for Falls  Goal: Patient will remain free of falls  Description  INTERVENTIONS:  - Assess patient frequently for physical needs  -  Identify cognitive and physical deficits and behaviors that affect risk of falls  -  [place] fall precautions as indicated by assessment   - Educate patient/family on patient safety including physical limitations  - Instruct patient to call for assistance with activity based on assessment  - Modify environment to reduce risk of injury  - Consider OT/PT consult to assist with strengthening/mobility  Outcome: Progressing     Problem: PAIN - ADULT  Goal: Verbalizes/displays adequate comfort level or baseline comfort level  Description  Interventions:  - Encourage patient to monitor pain and request assistance  - Assess pain using appropriate pain scale  - Administer analgesics based on type and severity of pain and evaluate response  - Implement non-pharmacological measures as appropriate and evaluate response  - Notify physician/advanced practitioner if interventions unsuccessful or patient reports new pain   Outcome: Progressing     Problem: DISCHARGE PLANNING  Goal: Discharge to home or other facility with appropriate resources  Description  INTERVENTIONS:  - Identify barriers to discharge w/patient and caregiver  - Arrange for needed discharge resources and transportation as appropriate  - Identify discharge learning needs (meds)  - Refer to Case Management Department for coordinating discharge planning if the patient needs post-hospital services based on physician/advanced practitioner order or complex needs related to functional status, cognitive ability, or social support system   Outcome: Progressing     Problem: Knowledge Deficit  Goal: Patient/family/caregiver demonstrates understanding of disease process, treatment plan, medications, and discharge instructions  Description  Complete learning assessment and assess knowledge base    Interventions:  - Provide teaching at level of understanding  - Provide teaching via preferred learning methods  Outcome: Progressing

## 2019-10-03 NOTE — PLAN OF CARE
Problem: Potential for Falls  Goal: Patient will remain free of falls  Description  INTERVENTIONS:  - Assess patient frequently for physical needs  -  Identify cognitive and physical deficits and behaviors that affect risk of falls  -  [place] fall precautions as indicated by assessment   - Educate patient/family on patient safety including physical limitations  - Instruct patient to call for assistance with activity based on assessment  - Modify environment to reduce risk of injury  - Consider OT/PT consult to assist with strengthening/mobility  Outcome: Progressing     Problem: PAIN - ADULT  Goal: Verbalizes/displays adequate comfort level or baseline comfort level  Description  Interventions:  - Encourage patient to monitor pain and request assistance  - Assess pain using appropriate pain scale  - Administer analgesics based on type and severity of pain and evaluate response  - Implement non-pharmacological measures as appropriate and evaluate response  - Notify physician/advanced practitioner if interventions unsuccessful or patient reports new pain   Outcome: Progressing     Problem: DISCHARGE PLANNING  Goal: Discharge to home or other facility with appropriate resources  Description  INTERVENTIONS:  - Identify barriers to discharge w/patient and caregiver  - Arrange for needed discharge resources and transportation as appropriate  - Identify discharge learning needs (meds)  - Refer to Case Management Department for coordinating discharge planning if the patient needs post-hospital services based on physician/advanced practitioner order or complex needs related to functional status, cognitive ability, or social support system   Outcome: Progressing     Problem: Knowledge Deficit  Goal: Patient/family/caregiver demonstrates understanding of disease process, treatment plan, medications, and discharge instructions  Description  Complete learning assessment and assess knowledge base    Interventions:  - Provide teaching at level of understanding  - Provide teaching via preferred learning methods  Outcome: Progressing     Problem: Prexisting or High Potential for Compromised Skin Integrity  Goal: Skin integrity is maintained or improved  Description  INTERVENTIONS:  - Identify patients at risk for skin breakdown  - Assess and monitor skin integrity  - Assess and monitor nutrition and hydration status  - Monitor labs   - Assess for incontinence   - Turn and reposition patient  - Assist with mobility/ambulation  - Relieve pressure over bony prominences  - Avoid friction and shearing  - Provide appropriate hygiene as needed including keeping skin clean and dry  - Evaluate need for skin moisturizer/barrier cream  - Collaborate with interdisciplinary team   - Patient/family teaching  - Consider wound care consult   Outcome: Progressing

## 2019-10-03 NOTE — PROGRESS NOTES
Progress Note - Mara Reis 1960, 59 y o  female MRN: [id number]    Unit/Bed#: E4 -01 Encounter: [id number]    Primary Care Provider: Michelle Mckeon DO   Date and time admitted to hospital: 10/2/2019  2:26 PM        * Dizziness  Assessment & Plan  · Migrainous versus polypharmacy   · Treat migraine headache and observe for improvement of dizziness  · She requires multiple potentially sedating medication for her neuropathic pain/bipolar disorder/migraine  · Patient requires another midnight stay    Intractable migraine without aura and without status migrainosus  Assessment & Plan  · On Aimovig and Botox injections outpatient  · Continue Zonegran  · Start Toradol with Protonix for GI protection  · Give magnesium sulfate 2 g IV x1  · Consult Neurology  · Avoid narcotics    Chronic anticoagulation  Assessment & Plan  · Due to history of DVT  · Continue Eliquis 5 mg BID    Bipolar disorder  Assessment & Plan  · On several psychiatric medications as an outpatient  · Continue psychiatric medications with caution  VTE Pharmacologic Prophylaxis:   Pharmacologic: Apixaban (Eliquis)  Mechanical VTE Prophylaxis in Place: Yes    Patient Centered Rounds: I have performed bedside rounds with nursing staff today  Discussions with Specialists or Other Care Team Provider:  Hospital course and outpatient records reviewed    Time Spent for Care: 20 minutes  More than 50% of total time spent on counseling and coordination of care as described above  Current Length of Stay: 1 day(s)    Current Patient Status: Observation   Certification Statement: The patient, admitted on an observation basis, will now require > 2 midnight hospital stay due to Intractable headache    Discharge Plan:  Not ready for discharge today    Will need a 2nd midnight    Code Status: Level 1 - Full Code      Subjective:   Patient complains of persistent dizziness and headache  She felt that the headache was starting to develop yesterday and is still persisting with a 9/10 intensity  She is more comfortable by wearing dark sunglasses in the room    Objective:     Vitals:   Temp (24hrs), Av 3 °F (36 3 °C), Min:97 °F (36 1 °C), Max:97 4 °F (36 3 °C)    Temp:  [97 °F (36 1 °C)-97 4 °F (36 3 °C)] 97 4 °F (36 3 °C)  HR:  [60-81] 71  Resp:  [16-20] 18  BP: (125-160)/(59-87) 125/73  SpO2:  [94 %-98 %] 97 %  Body mass index is 40 59 kg/m²  Input and Output Summary (last 24 hours): Intake/Output Summary (Last 24 hours) at 10/3/2019 1745  Last data filed at 10/3/2019 1343  Gross per 24 hour   Intake 2040 ml   Output    Net 2040 ml       Physical Exam:     Physical Exam   Constitutional: She is oriented to person, place, and time  She appears well-developed  No distress  HENT:   Head: Normocephalic and atraumatic  Eyes:   She is is wearing dark sunglasses due to her headache   Neck: No JVD present  Cardiovascular: Regular rhythm  Exam reveals no friction rub  No murmur heard  Pulmonary/Chest: Effort normal  No stridor  No respiratory distress  Abdominal: Soft  Musculoskeletal: She exhibits no edema or deformity  Neurological: She is alert and oriented to person, place, and time  Skin: Skin is warm and dry  She is not diaphoretic  Psychiatric: She has a normal mood and affect   Her behavior is normal      Additional Data:     Labs:    Results from last 7 days   Lab Units 10/03/19  0542   WBC Thousand/uL 6 38   HEMOGLOBIN g/dL 11 4*   HEMATOCRIT % 36 1   PLATELETS Thousands/uL 269   NEUTROS PCT % 56   LYMPHS PCT % 29   MONOS PCT % 11   EOS PCT % 2     Results from last 7 days   Lab Units 10/03/19  0542   SODIUM mmol/L 145   POTASSIUM mmol/L 3 6   CHLORIDE mmol/L 111*   CO2 mmol/L 25   BUN mg/dL 15   CREATININE mg/dL 0 73   ANION GAP mmol/L 9   CALCIUM mg/dL 8 8   GLUCOSE RANDOM mg/dL 86         Results from last 7 days   Lab Units 10/03/19  0756   POC GLUCOSE mg/dl 90                   * I Have Reviewed All Lab Data Listed Above   * Additional Pertinent Lab Tests Reviewed: All Labs Within Last 24 Hours Reviewed    Imaging:    Imaging Reports Reviewed Today Include:  CT head    Recent Cultures (last 7 days):           Last 24 Hours Medication List:     Current Facility-Administered Medications:  acetaminophen 650 mg Oral Q6H PRN Rigo Cabrera PA-C   apixaban 5 mg Oral BID Jame Sleight GySIRISHA bruno   ARIPiprazole 5 mg Oral Daily [facility] SIRISHA Delacruz   diphenhydrAMINE 25 mg Intravenous Q8H PRN Mari Castro MD   fluvoxaMINE 100 mg Oral HS Rigo Cabrera PA-C   gabapentin 600 mg Oral TID Rigo Cabrera PA-C   ketorolac 30 mg Intravenous Q12H [address] Mari Castro MD   lamoTRIgine 200 mg Oral Daily [facility] NubiaSIRISHA douglass   lidocaine 1 patch Topical Daily [facility] SIRISHA Delacruz   LORazepam 2 mg Oral TID [facility] SIRISHA Carey   magnesium sulfate 2 g Intravenous Once Mari Castro MD   [facility]izine 12 5 [facility] Nub[facility]ouglass   mexiletine 150 mg Oral TID [facility] SIRISHA Carey   ondansetron 4 mg Intravenous Q6H PRN Rigo Cabrera PA-C   pantoprazole 40 mg Oral Early Morning Mari Castro MD   zonisamide 200 mg Oral Daily Rigo Cabrera PA-C        Today, Patient Was Seen By: Mrai Castro MD    ** Please Note: Dictation voice to text software may have been used in the creation of this document   **

## 2019-10-03 NOTE — ASSESSMENT & PLAN NOTE
· On Aimovig and Botox injections outpatient  · Continue Zonegran  · Start Toradol with Protonix for GI protection  · Give magnesium sulfate 2 g IV x1  · Consult Neurology  · Avoid narcotics

## 2019-10-04 PROCEDURE — 97166 OT EVAL MOD COMPLEX 45 MIN: CPT

## 2019-10-04 PROCEDURE — G8978 MOBILITY CURRENT STATUS: HCPCS

## 2019-10-04 PROCEDURE — 97163 PT EVAL HIGH COMPLEX 45 MIN: CPT

## 2019-10-04 PROCEDURE — 99221 1ST HOSP IP/OBS SF/LOW 40: CPT | Performed by: PSYCHIATRY & NEUROLOGY

## 2019-10-04 PROCEDURE — G8979 MOBILITY GOAL STATUS: HCPCS

## 2019-10-04 PROCEDURE — G8987 SELF CARE CURRENT STATUS: HCPCS

## 2019-10-04 PROCEDURE — 99225 PR SBSQ OBSERVATION CARE/DAY 25 MINUTES: CPT | Performed by: INTERNAL MEDICINE

## 2019-10-04 PROCEDURE — G8988 SELF CARE GOAL STATUS: HCPCS

## 2019-10-04 RX ORDER — MAGNESIUM SULFATE HEPTAHYDRATE 40 MG/ML
2 INJECTION, SOLUTION INTRAVENOUS
Status: COMPLETED | OUTPATIENT
Start: 2019-10-04 | End: 2019-10-06

## 2019-10-04 RX ADMIN — DIPHENHYDRAMINE HYDROCHLORIDE 25 MG: 50 INJECTION, SOLUTION INTRAMUSCULAR; INTRAVENOUS at 08:52

## 2019-10-04 RX ADMIN — ARIPIPRAZOLE 5 MG: 5 TABLET ORAL at 08:50

## 2019-10-04 RX ADMIN — APIXABAN 5 MG: 5 TABLET, FILM COATED ORAL at 08:50

## 2019-10-04 RX ADMIN — MECLIZINE 12.5 MG: 12.5 TABLET ORAL at 13:59

## 2019-10-04 RX ADMIN — VALPROATE SODIUM 1000 MG: 100 INJECTION, SOLUTION INTRAVENOUS at 16:00

## 2019-10-04 RX ADMIN — DIPHENHYDRAMINE HYDROCHLORIDE 25 MG: 50 INJECTION, SOLUTION INTRAMUSCULAR; INTRAVENOUS at 21:36

## 2019-10-04 RX ADMIN — FLUVOXAMINE MALEATE 100 MG: 50 TABLET, FILM COATED ORAL at 21:36

## 2019-10-04 RX ADMIN — GABAPENTIN 600 MG: 300 CAPSULE ORAL at 21:36

## 2019-10-04 RX ADMIN — LAMOTRIGINE 200 MG: 100 TABLET ORAL at 08:50

## 2019-10-04 RX ADMIN — ZONISAMIDE 200 MG: 100 CAPSULE ORAL at 08:50

## 2019-10-04 RX ADMIN — ONDANSETRON 4 MG: 2 INJECTION INTRAMUSCULAR; INTRAVENOUS at 13:13

## 2019-10-04 RX ADMIN — KETOROLAC TROMETHAMINE 30 MG: 30 INJECTION, SOLUTION INTRAMUSCULAR at 21:36

## 2019-10-04 RX ADMIN — ACETAMINOPHEN 650 MG: 325 TABLET ORAL at 13:12

## 2019-10-04 RX ADMIN — MAGNESIUM SULFATE HEPTAHYDRATE 2 G: 40 INJECTION, SOLUTION INTRAVENOUS at 16:00

## 2019-10-04 RX ADMIN — LORAZEPAM 2 MG: 1 TABLET ORAL at 08:50

## 2019-10-04 RX ADMIN — GABAPENTIN 600 MG: 300 CAPSULE ORAL at 16:00

## 2019-10-04 RX ADMIN — KETOROLAC TROMETHAMINE 30 MG: 30 INJECTION, SOLUTION INTRAMUSCULAR at 08:50

## 2019-10-04 RX ADMIN — LIDOCAINE 1 PATCH: 50 PATCH TOPICAL at 08:51

## 2019-10-04 RX ADMIN — MECLIZINE 12.5 MG: 12.5 TABLET ORAL at 06:38

## 2019-10-04 RX ADMIN — ACETAMINOPHEN 650 MG: 325 TABLET ORAL at 06:42

## 2019-10-04 RX ADMIN — APIXABAN 5 MG: 5 TABLET, FILM COATED ORAL at 18:00

## 2019-10-04 RX ADMIN — ONDANSETRON 4 MG: 2 INJECTION INTRAMUSCULAR; INTRAVENOUS at 03:23

## 2019-10-04 RX ADMIN — GABAPENTIN 600 MG: 300 CAPSULE ORAL at 08:50

## 2019-10-04 RX ADMIN — LORAZEPAM 2 MG: 1 TABLET ORAL at 16:00

## 2019-10-04 RX ADMIN — PANTOPRAZOLE SODIUM 40 MG: 40 TABLET, DELAYED RELEASE ORAL at 06:37

## 2019-10-04 RX ADMIN — MECLIZINE 12.5 MG: 12.5 TABLET ORAL at 21:36

## 2019-10-04 RX ADMIN — MEXILETINE HYDROCHLORIDE 150 MG: 150 CAPSULE ORAL at 08:50

## 2019-10-04 RX ADMIN — MEXILETINE HYDROCHLORIDE 150 MG: 150 CAPSULE ORAL at 21:36

## 2019-10-04 RX ADMIN — ONDANSETRON 4 MG: 2 INJECTION INTRAMUSCULAR; INTRAVENOUS at 21:46

## 2019-10-04 RX ADMIN — MEXILETINE HYDROCHLORIDE 150 MG: 150 CAPSULE ORAL at 16:01

## 2019-10-04 RX ADMIN — LORAZEPAM 2 MG: 1 TABLET ORAL at 21:36

## 2019-10-04 NOTE — PLAN OF CARE
Problem: Potential for Falls  Goal: Patient will remain free of falls  Description  INTERVENTIONS:  - Assess patient frequently for physical needs  -  Identify cognitive and physical deficits and behaviors that affect risk of falls  -  [place] fall precautions as indicated by assessment   - Educate patient/family on patient safety including physical limitations  - Instruct patient to call for assistance with activity based on assessment  - Modify environment to reduce risk of injury  - Consider OT/PT consult to assist with strengthening/mobility  Outcome: Progressing     Problem: PAIN - ADULT  Goal: Verbalizes/displays adequate comfort level or baseline comfort level  Description  Interventions:  - Encourage patient to monitor pain and request assistance  - Assess pain using appropriate pain scale  - Administer analgesics based on type and severity of pain and evaluate response  - Implement non-pharmacological measures as appropriate and evaluate response  - Notify physician/advanced practitioner if interventions unsuccessful or patient reports new pain   Outcome: Progressing     Problem: DISCHARGE PLANNING  Goal: Discharge to home or other facility with appropriate resources  Description  INTERVENTIONS:  - Identify barriers to discharge w/patient and caregiver  - Arrange for needed discharge resources and transportation as appropriate  - Identify discharge learning needs (meds)  - Refer to Case Management Department for coordinating discharge planning if the patient needs post-hospital services based on physician/advanced practitioner order or complex needs related to functional status, cognitive ability, or social support system   Outcome: Progressing     Problem: Knowledge Deficit  Goal: Patient/family/caregiver demonstrates understanding of disease process, treatment plan, medications, and discharge instructions  Description  Complete learning assessment and assess knowledge base    Interventions:  - Provide teaching at level of understanding  - Provide teaching via preferred learning methods  Outcome: Progressing     Problem: Prexisting or High Potential for Compromised Skin Integrity  Goal: Skin integrity is maintained or improved  Description  INTERVENTIONS:  - Identify patients at risk for skin breakdown  - Assess and monitor skin integrity  - Assess and monitor nutrition and hydration status  - Monitor labs   - Assess for incontinence   - Turn and reposition patient  - Assist with mobility/ambulation  - Relieve pressure over bony prominences  - Avoid friction and shearing  - Provide appropriate hygiene as needed including keeping skin clean and dry  - Evaluate need for skin moisturizer/barrier cream  - Collaborate with interdisciplinary team   - Patient/family teaching  - Consider wound care consult   Outcome: Progressing     Problem: Nutrition/Hydration-ADULT  Goal: Nutrient/Hydration intake appropriate for improving, restoring or maintaining nutritional needs  Description  Monitor and assess patient's nutrition/hydration status for malnutrition  Collaborate with interdisciplinary team and initiate plan and interventions as ordered  Monitor patient's weight and dietary intake as ordered or per policy  Utilize nutrition screening tool and intervene as necessary  Determine patient's food preferences and provide high-protein, high-caloric foods as appropriate       INTERVENTIONS:  - Monitor oral intake, urinary output, labs, and treatment plans  - Assess nutrition and hydration status and recommend course of action  - Evaluate amount of meals eaten  - Assist patient with eating if necessary   - Allow adequate time for meals  - Recommend/ encourage appropriate diets, oral nutritional supplements, and vitamin/mineral supplements  - Order, calculate, and assess calorie counts as needed  - Recommend, monitor, and adjust tube feedings and TPN/PPN based on assessed needs  - Assess need for intravenous fluids  - Provide specific nutrition/hydration education as appropriate  - Include patient/family/caregiver in decisions related to nutrition  Outcome: Progressing

## 2019-10-04 NOTE — PLAN OF CARE
Problem: OCCUPATIONAL THERAPY ADULT  Goal: Performs self-care activities at highest level of function for planned discharge setting  See evaluation for individualized goals  Description  Treatment Interventions: ADL retraining, Functional transfer training, UE strengthening/ROM, Endurance training, Patient/family training, Equipment evaluation/education, Compensatory technique education, Energy conservation, Activityengagement          See flowsheet documentation for full assessment, interventions and recommendations  Note:   Limitation: Decreased ADL status, Decreased UE strength, Decreased endurance, Decreased self-care trans, Decreased high-level ADLs  Prognosis: Good  Assessment: Pt is a 59 y o  female seen for OT evaluation s/p adm to [facility] [address] on 10/2/2019 w/ three syncopal episodes and dizziness  CT Head and CT cervical spine (-) for acute findings  Comorbidities affecting pts functional performance include a significant PMH of Bipolar disorder, Cervical radiculopathy, DVT, Fibromyalgia, HTN, Migraine, MRSA, spinal stenosis, and syncope  Pt with active OT orders and activity orders for Up with assistance  Pt lives with spouse in a multi-level house with 3 BECCA and 1st floor setup  Pt (+) home alone during the day  At baseline, pt was I w/ ADLs, IADLs, and functional mobility/transfers w/ use of RW as needed, (+) , and (+) falls PTA  Upon evaluation, pt currently requires Min A-Supervision for overall ADLs, Supervision for bed mobility, and Supervision for functional mobility/transfers 2* the following deficits impacting occupational performance: weakness, decreased strength, decreased balance, decreased tolerance and decreased safety awareness  These impairments, as well at pts steps to enter environment, limited home support, difficulty performing ADLS and difficulty performing IADLS  limit pts ability to safely engage in all baseline areas of occupation   Pt scored overall 60/100 on the Barthel Index  Based on the aforementioned OT evaluation, functional performance deficits, and assessments, pt has been identified as a Moderate complexity evaluation  Pt to continue to benefit from continued acute OT services during hospital stay to address defined deficits and to maximize level of functional independence in the following Occupational Performance areas: grooming, bathing/shower, toilet hygiene, dressing, medication management, health maintenance, functional mobility, community mobility, clothing management, cleaning, meal prep and household maintenance  From OT standpoint, recommend Home OT upon D/C   OT will continue to follow pt 3-5x/wk to address the following goals to  w/in 10/ days:     OT Discharge Recommendation: Home OT  OT - OK to Discharge: Yes(when medically cleared)

## 2019-10-04 NOTE — UTILIZATION REVIEW
Continued Stay Review    PLEASE NOTE:  Patient UPGRADED to IP 10/4 @ 24-20-52-61 from OBS 10/2 @ 1910 due to Pwersistent dizziness and Headache requiring IV Meds  Start   Ordered   10/04/19 1555  Inpatient Admission Once     Transfer Service: General Medicine       Question Answer Comment   Admitting Physician Carol Mo    Level of Care Med Surg    Bed Type [place]    Estimated length of stay More than 2 Midnights    Certification I certify that inpatient services are medically necessary for this patient for a duration of greater than two midnights  See H&P and MD Progress Notes for additional information about the patient's course of treatment  10/04/19 8661       Date:  10/4/2019                   Current Patient Class:  IP  Current Level of Care: MedSurg    HPI:58 y o  female initially admitted on 10/02/2019 @ 1910  To OBS  With Syncope  - On 10/3 she continued with persistent dizziness and headache 9/10  Migrainous versus polypharmacy  She was given MAgSulfate IV x 1, Zonegram was continued, started on Toradol  Neuro was consulted     Assessment/Plan:  Continues with HA  9/10  And dizziness  Wearing sunglasses in room  No improvement with toradol or Mag Sulfate  Per Neuro: Recurrent episodes of status migrainosus; Suspect near syncopal events secondary to polypharmacy  Start IV Depacon x 3 days and continue home Lamictal 200 mg daily and Zonegran 200 mg daily  hold on DHE for now, may consider if headache remains intractable       Pertinent Labs/Diagnostic Results:   Results from last 7 days   Lab Units 10/03/19  0542 10/02/19  1510   WBC Thousand/uL 6 38 6 68   HEMOGLOBIN g/dL 11 4* 12 3   HEMATOCRIT % 36 1 39 2   PLATELETS Thousands/uL 269 285   NEUTROS ABS Thousands/µL 3 60 3 65     Results from last 7 days   Lab Units 10/03/19  0542 10/02/19  1510   SODIUM mmol/L 145 145   POTASSIUM mmol/L 3 6 4 0   CHLORIDE mmol/L 111* 108   CO2 mmol/L 25 29   ANION GAP mmol/L 9 8   BUN mg/dL 15 18   CREATININE mg/dL 0 73 0 82   EGFR ml/min/1 73sq m 90 78   CALCIUM mg/dL 8 8 8 9   MAGNESIUM mg/dL  --  2 4     Results from last 7 days   Lab Units 10/03/19  0756   POC GLUCOSE mg/dl 90     Results from last 7 days   Lab Units 10/03/19  0542 10/02/19  1510   GLUCOSE RANDOM mg/dL 86 98     Results from last 7 days   Lab Units 10/03/19  0542   TSH 3RD GENERATON uIU/mL 1 884     Vital Signs:   10/04/19 0745    102    110/71      Standing - Orthostatic VS   10/04/19 0741    89    106/62      Sitting - Orthostatic VS   10/04/19 0738  97 2 °F (36 2 °C)  70  18  150/80  96 %  None (Room air)  Lying - Orthostatic VS   10/04/19 0003  97 1 °F (36 2 °C)   67  18  121/67  95 %    Lying       Medications:   Scheduled Meds:     Scheduled Meds:  Current Facility-Administered Medications:  acetaminophen 650 mg Oral Q6H PRN X 1 10/3 & x 2 10/4   apixaban 5 mg Oral BID    ARIPiprazole 5 mg Oral Daily    diphenhydrAMINE 25 mg Intravenous Q8H PRN  x 1 10/3  &  X 1  10/4   fluvoxaMINE 100 mg Oral HS    gabapentin 600 mg Oral TID    ketorolac 30 mg Intravenous Q12H KIRSTIE    lidocaine 1 patch Topical Daily    LORazepam 2 mg Oral TID    magnesium sulfate 2 g Intravenous Q24H KIRSTIE    meclizine 12 5 mg Oral Q8H [address]    mexiletine 150 mg Oral TID    ondansetron 4 mg Intravenous Q6H PRN  IV x 2  10/4   pantoprazole 40 mg Oral Early Morning    valproate (DEPACON) IVPB 1,000 mg Intravenous Q24H [address] Started 10/4   zonisamide 200 mg Oral Daily          Discharge Plan: D    Network Utilization Review Department  Phone: [phone]; Fax [phone]  [email or web address]  ATTENTION: Please call with any questions or concerns to [phone]  and carefully listen to the prompts so that you are directed to the right person  Send all requests for admission clinical reviews, approved or denied determinations and any other requests to fax [phone]   All voicemails are confidential

## 2019-10-04 NOTE — PLAN OF CARE
Problem: Potential for Falls  Goal: Patient will remain free of falls  Description  INTERVENTIONS:  - Assess patient frequently for physical needs  -  Identify cognitive and physical deficits and behaviors that affect risk of falls  -  [place] fall precautions as indicated by assessment   - Educate patient/family on patient safety including physical limitations  - Instruct patient to call for assistance with activity based on assessment  - Modify environment to reduce risk of injury  - Consider OT/PT consult to assist with strengthening/mobility  Outcome: Progressing     Problem: PAIN - ADULT  Goal: Verbalizes/displays adequate comfort level or baseline comfort level  Description  Interventions:  - Encourage patient to monitor pain and request assistance  - Assess pain using appropriate pain scale  - Administer analgesics based on type and severity of pain and evaluate response  - Implement non-pharmacological measures as appropriate and evaluate response  - Notify physician/advanced practitioner if interventions unsuccessful or patient reports new pain   Outcome: Progressing     Problem: DISCHARGE PLANNING  Goal: Discharge to home or other facility with appropriate resources  Description  INTERVENTIONS:  - Identify barriers to discharge w/patient and caregiver  - Arrange for needed discharge resources and transportation as appropriate  - Identify discharge learning needs (meds)  - Refer to Case Management Department for coordinating discharge planning if the patient needs post-hospital services based on physician/advanced practitioner order or complex needs related to functional status, cognitive ability, or social support system   Outcome: Progressing     Problem: Knowledge Deficit  Goal: Patient/family/caregiver demonstrates understanding of disease process, treatment plan, medications, and discharge instructions  Description  Complete learning assessment and assess knowledge base    Interventions:  - Provide teaching at level of understanding  - Provide teaching via preferred learning methods  Outcome: Progressing     Problem: Prexisting or High Potential for Compromised Skin Integrity  Goal: Skin integrity is maintained or improved  Description  INTERVENTIONS:  - Identify patients at risk for skin breakdown  - Assess and monitor skin integrity  - Assess and monitor nutrition and hydration status  - Monitor labs   - Assess for incontinence   - Turn and reposition patient  - Assist with mobility/ambulation  - Relieve pressure over bony prominences  - Avoid friction and shearing  - Provide appropriate hygiene as needed including keeping skin clean and dry  - Evaluate need for skin moisturizer/barrier cream  - Collaborate with interdisciplinary team   - Patient/family teaching  - Consider wound care consult   Outcome: Progressing     Problem: Nutrition/Hydration-ADULT  Goal: Nutrient/Hydration intake appropriate for improving, restoring or maintaining nutritional needs  Description  Monitor and assess patient's nutrition/hydration status for malnutrition  Collaborate with interdisciplinary team and initiate plan and interventions as ordered  Monitor patient's weight and dietary intake as ordered or per policy  Utilize nutrition screening tool and intervene as necessary  Determine patient's food preferences and provide high-protein, high-caloric foods as appropriate       INTERVENTIONS:  - Monitor oral intake, urinary output, labs, and treatment plans  - Assess nutrition and hydration status and recommend course of action  - Evaluate amount of meals eaten  - Assist patient with eating if necessary   - Allow adequate time for meals  - Recommend/ encourage appropriate diets, oral nutritional supplements, and vitamin/mineral supplements  - Order, calculate, and assess calorie counts as needed  - Recommend, monitor, and adjust tube feedings and TPN/PPN based on assessed needs  - Assess need for intravenous fluids  - Provide specific nutrition/hydration education as appropriate  - Include patient/family/caregiver in decisions related to nutrition  Outcome: Progressing

## 2019-10-04 NOTE — PLAN OF CARE
Problem: PHYSICAL THERAPY ADULT  Goal: Performs mobility at highest level of function for planned discharge setting  See evaluation for individualized goals  Description  Treatment/Interventions: Functional transfer training, LE strengthening/ROM, Elevations, Therapeutic exercise, Endurance training, Patient/family training, Equipment eval/education, Bed mobility, Gait training, Compensatory technique education, Spoke to nursing, OT  Equipment Recommended: Walker(pt reports she owns RW)       See flowsheet documentation for full assessment, interventions and recommendations  Note:   Prognosis: Good  Problem List: Decreased endurance, Impaired balance, Decreased mobility, Decreased coordination, Pain  Assessment: Pt is a 59 y o  female seen for PT evaluation [facility]  Two pt identifiers were used to confirm  Pt presented with dizziness and frequent syncope episodes on 10/2/19  Pt was admitted with a primary dx of: syncope  PT now consulted for assessment of mobility and d/c needs  Pts current co morbidities effecting treatment include: dizziness, bipolar disorder, intractable migraine, Andrés's disease, fibromyalgia OA, anxiety and personal factors including 3 BECCA home environment  Pt currently lives in a three story home with a first floor setup with her   Pts current clinical presentation is Unstable/ Unpredictable (high complexity) due to Ongoing medical management for primary dx, decreased activity tolerance compared to baseline, fall risk, increased assistance needed from caregiver at current time, continuous pulse oximetry monitoring, multiple lines, decline in overall functional mobility status  Prior to admission, pt was mobilizing with the use of a RW or SPC as per pt  Upon evaluation, pt currently is requiring supervision assist for bed mobility; supervision assist for transfers and supervision assist for ambulation w/ RW   Pt displays decreased step and stride length, inconsistent gil, decreased gait speed, improper weight shift  Pt presents at PT eval functioning below baseline and currently w/ overall mobility deficits secondary to: decreased endurance, impaired balance, impaired coordination, pain  Pt currently at a fall risk secondary to impairments listed above  Based on PT evaluation, pt will continue to benefit from skilled acute PT interventions to address stated impairments; to maximize functional mobility; for ongoing pt/ family training; and DME needs  At conclusion of PT session pt was left supine in bed, all needs within reach  Provided pt education regarding PT plan to improve functional mobility status  PT is currently recommending home with family support and home PT  Pt agreeable to plan and goals as stated on evaluation  PT will continue to follow during hospital stay  Recommendation: Home with family support, Home PT     PT - OK to Discharge: Yes    See flowsheet documentation for full assessment

## 2019-10-04 NOTE — ASSESSMENT & PLAN NOTE
· Migrainous versus polypharmacy   · Patient reports that dizziness always occurs when she gets migraine the exacerbation  · She requires multiple potentially sedating medication for her neuropathic pain/bipolar disorder/migraine  · Await neuro evaluation

## 2019-10-04 NOTE — PROGRESS NOTES
Progress Note - Claude Hodges 1960, 59 y o  female MRN: [id number]    Unit/Bed#: E4 -01 Encounter: [id number]    Primary Care Provider: Arthur Santizo DO   Date and time admitted to hospital: 10/2/2019  2:26 PM        * Dizziness  Assessment & Plan  · Migrainous versus polypharmacy   · Patient reports that dizziness always occurs when she gets migraine the exacerbation  · She requires multiple potentially sedating medication for her neuropathic pain/bipolar disorder/migraine  · Await neuro evaluation    Intractable migraine without aura and without status migrainosus  Assessment & Plan  · On Aimovig and Botox injections outpatient  · Continue Zonegran  · Patient reports improvement, temporarily with Toradol   · Continue Protonix for GI protection especially with history of gastric sleeve  · S/P magnesium sulfate 2 g IV x1  · Neurology consult pending    Chronic anticoagulation  Assessment & Plan  · Due to history of DVT  · Continue Eliquis 5 mg BID    Bipolar disorder  Assessment & Plan  · On several psychiatric medications as an outpatient  · Continue psychiatric medications with caution  VTE Pharmacologic Prophylaxis:   Pharmacologic: Apixaban (Eliquis)  Mechanical VTE Prophylaxis in Place: Yes    Time Spent for Care: 20 minutes  More than 50% of total time spent on counseling and coordination of care as described above  Current Length of Stay: 1 day(s)    Current Patient Status: Observation   Certification Statement: The patient, admitted on an observation basis, will now require > 2 midnight hospital stay due to Intractable migraine    Discharge Plan:   To be determined    Code Status: Level 1 - Full Code    Subjective:   Still reports 9/10 headache and dizziness  She was wearing her sunglasses in the room  Reports transient relief with toradol  Did not feel improvement with magnesium sulfate    Objective:     Vitals:   Temp (24hrs), Av 5 °F (36 4 °C), Min:97 1 °F (36 2 °C), Max:98 1 °F (36 7 °C)    Temp:  [97 1 °F (36 2 °C)-98 1 °F (36 7 °C)] 97 2 °F (36 2 °C)  HR:  [] 102  Resp:  [18] 18  BP: (106-167)/(62-88) 110/71  SpO2:  [95 %-97 %] 96 %  Body mass index is 40 59 kg/m²  Input and Output Summary (last 24 hours): Intake/Output Summary (Last 24 hours) at 10/4/2019 1010  Last data filed at 10/4/2019 0738  Gross per 24 hour   Intake 640 ml   Output    Net 640 ml       Physical Exam:     Physical Exam   Constitutional: She is oriented to person, place, and time  No distress  obese   HENT:   Head: Normocephalic and atraumatic  Eyes: No scleral icterus  Neck: No JVD present  Cardiovascular: Regular rhythm  Exam reveals no gallop and no friction rub  Pulmonary/Chest: Effort normal  No stridor  No respiratory distress  She has no wheezes  Abdominal: Soft  She exhibits no distension  There is no tenderness  There is no guarding  Musculoskeletal: She exhibits no edema  Neurological: She is alert and oriented to person, place, and time  Skin: Skin is warm and dry  She is not diaphoretic  Psychiatric: She has a normal mood and affect  Her behavior is normal    Vitals reviewed  Additional Data:     Labs:    Results from last 7 days   Lab Units 10/03/19  0542   WBC Thousand/uL 6 38   HEMOGLOBIN g/dL 11 4*   HEMATOCRIT % 36 1   PLATELETS Thousands/uL 269   NEUTROS PCT % 56   LYMPHS PCT % 29   MONOS PCT % 11   EOS PCT % 2     Results from last 7 days   Lab Units 10/03/19  0542   SODIUM mmol/L 145   POTASSIUM mmol/L 3 6   CHLORIDE mmol/L 111*   CO2 mmol/L 25   BUN mg/dL 15   CREATININE mg/dL 0 73   ANION GAP mmol/L 9   CALCIUM mg/dL 8 8   GLUCOSE RANDOM mg/dL 86         Results from last 7 days   Lab Units 10/03/19  0756   POC GLUCOSE mg/dl 90                   * I Have Reviewed All Lab Data Listed Above  * Additional Pertinent Lab Tests Reviewed:  All Labs Within Last 24 Hours Reviewed    Imaging:    Imaging Reports Reviewed Today Include: no new imaging    Recent Cultures (last 7 days):           Last 24 Hours Medication List:     Current Facility-Administered Medications:  acetaminophen 650 mg Oral Q6H PRN Catana Pu, PA-C   apixaban 5 mg Oral BID Donnel Trung Gydamion, PA-C   ARIPiprazole 5 mg Oral Daily Donnel Trung Nubia, PA-C   diphenhydrAMINE 25 mg Intravenous Q8H PRN Di Andre MD   fluvoxaMINE 100 mg Oral HS Catana Pu, PA-C   gabapentin 600 mg Oral TID Catana Pu, PA-C   ketorolac 30 mg Intravenous Q12H [facility] Di Andre MD   lamoTRIgine 200 mg Oral Daily Donnel Trung Nubia, PA-C   lidocaine 1 patch Topical Daily Donnel Trung Nubia, PA-C   LORazepam 2 mg Oral TID Catana Pu, PA-C   meclizine 12 5 mg Oral [facility] Donnel Trung [place], [place]   mexiletine 150 mg Oral TID Aidan Trung AVINASH Carey-C   ondansetron 4 mg Intravenous Q6H PRN Catana Pu, PA-C   pantoprazole 40 mg Oral Early Morning Di Andre MD   zonisamide 200 mg Oral Daily Catana Pu, PA-C        Today, Patient Was Seen By: Di Andre MD    ** Please Note: Dictation voice to text software may have been used in the creation of this document   **

## 2019-10-04 NOTE — CONSULTS
Consultation - Neurology   Erlin Samuel 59 y o  female MRN: [id number]  Unit/Bed#: E4 -01 Encounter: [id number]      Assessment/Plan   Assessment:  Erlin Samuel is a 59 y o  female with recurrent episodes of status migrainosus, who presents with near syncope and intractable headache  Patient's recent admission in July 2019 is a nearly similar presentation  Suspect near syncopal events secondary to polypharmacy, as patient is on Abilify, Luvox, gabapentin 600 TID, lamictal, ativan 2 mg daily, Lunesta, and zonegran, in addition to Haldol PRN headaches  Low suspicion for seizure as etiology for loss of consciousness, as patient is on Lamictal which should serve as prophylaxis  No concern for CVA at this point, as patient's only focal neurologic deficit includes numbness of the entire right side of her body (including the trunk) which is not typical of any vascular distribution  Plan:  - Migraine Treatment:   · Continue home Lamictal 200 mg daily and Zonegran 200 mg daily   · 1g IV Depacon x 3 days   · Magnesium sulfate  · Zofran PRN   · Continue Aimovig and Botox injections as an outpatient   · Patient reported prior allergy to Toradol, yet received during this admission without complications  Can continue with caution if beneficial (Pt has history of gastric sleeve)  · Will hold on DHE for now, may consider if headache remains intractable  - Patient has a history of requiring ketamine infusion to break her headaches and has specifically asked for this medication in the past  Will not prescribe ketamine during this admission due to concerns of polypharmacy and reports of dizziness  Also, patient had reported side effects to ketamine in the past (reported disorientation)  Of note, this would also require transfer to [facility]  - She is aware that complete headache relief may not be attainable     - Labs pending:   · Sed Rate  · Lamotrigine level  - Will defer further neurodiagnostic imaging   - Medical management and supportive care per primary team  Correction of any metabolic or infectious disturbances    - Follow up with Dr Nessa Franz and [facility] as scheduled  History of Present Illness     Reason for Consult / Principal Problem: Headache, dizziness    HPI: Maday Kearns is a 59 y o  right-handed female with migraines, DVT/PE on Eliquis, adrenal insufficiency, hypertension, hyperlipidemia, bipolar disorder, and fibromyalgia who presents with dizziness, multiple episodes near syncope, and intractable headache  The patient is well-known to myself and the Neurology service for status migrainosus  The patient follows with Dr Nessa Franz at [facility], but is frequently seen for intractable headaches at [facility]  The patient was most recently seen by [facility] Neurology in July 2019 with similar complaints as this admission  At that time, she also reported 3 episodes of near syncope, with falls and subsequent head trauma  The same history is provided with this admission  The patient is reporting today that she believes that the dizziness is more significant with changes in position  She describes it as a room spinning sensation as well as lightheadedness  She believes that she may have lost consciousness during the 3rd spell, as she had also reported back in July  She denied any episodes of urinary incontinence or bowel incontinence, or tongue bite  During the last admission, the patient acutely developed a headache after she had been admitted to the floor  The same scenario occurred this time  After admission, the patient reported that she was developing a headache, which she rated a 9/10  She describes it as a generalized headache, sharp in nature  She reports photophobia and phonophobia, and is sitting in her room in the dark with her sunglasses on  She does not appear in any significant distress    She reports that she is experiencing numbness on the right side of her body, which does occur with her headaches  She reports the numbness is present on the entire right side of her body, including the trunk  She reported significant nausea, yet was ordering a large meal upon entering the room  During her last admission, she was treated with IV fluids, continued on her home medications, and after development of the headache, was given 1mg DHE  She was also given magnesium  Per chart review, it does not appear that any other medications were used during her last admission  At many prior admissions, the patient had been transferred over to [facility] for ketamine infusion  The patient underwent workup during this admission including a CT head and C Spine which were negative for acute abnormalities  No underlying metabolic disturbances or signs of infection  Her blood pressures are stable  Orthostatics were taken a few times, with one round being positive for a drop in pressure with postural changes  Her blood pressure was 146/80 while lying, 167/87 while sitting, but then 138/88 when standing  She has remained afebrile       Inpatient consult to Neurology  Consult performed by: Elli Cantor PA-C  Consult ordered by: Wen Carter MD          Review of Systems   ROS: SEE HPI     Historical Information   Past Medical History:   Diagnosis Date    Adrenal insufficiency (Andrés's disease) ([facility] [address] [facility]     Bipolar disorder     Cervical radiculopathy     Chronic back pain     DVT, lower extremity ([facility] [address] ) 1991    Fibromyalgia     History of TIAs     cannot remember details    Hypertension     Hypokalemia     Migraine     MRSA (methicillin resistant Staphylococcus aureus) 07/20/2012    nasal swab negative 4/5/19    Psychiatric disorder     Anxiety, major depression, bipolar    Spinal stenosis     Syncope 2014    orthostatic hypotension     Past Surgical History:   Procedure Laterality Date    APPENDECTOMY      GASTRIC RESTRICTION SURGERY      Gastric Sleeve Nov 2015  HYSTERECTOMY      JOINT REPLACEMENT      Left Knee  and Right Hip      Social History   Social History     Substance and Sexual Activity   Alcohol Use Not Currently    Frequency: Never    Binge frequency: Never     Social History     Substance and Sexual Activity   Drug Use Never     Social History     Tobacco Use   Smoking Status Former Smoker    Packs/day: 0 20    Types: Cigarettes    Last attempt to quit:     Years since quittin 7   Smokeless Tobacco Never Used     Family History:   Family History   Problem Relation Age of Onset    Breast cancer Mother     Migraines Mother     Arthritis Mother     Kidney disease Father     Heart disease Father     Diabetes Father     Arthritis Father     Brain cancer Brother     Seizures Brother        Review of previous medical records was completed       Meds/Allergies   all current active meds have been reviewed, current meds:   Current Facility-Administered Medications   Medication Dose Route Frequency    acetaminophen (TYLENOL) tablet 650 mg  650 mg Oral Q6H PRN    apixaban (ELIQUIS) tablet 5 mg  5 mg Oral BID    ARIPiprazole (ABILIFY) tablet 5 mg  5 mg Oral Daily    diphenhydrAMINE (BENADRYL) injection 25 mg  25 mg Intravenous Q8H PRN    fluvoxaMINE (LUVOX) tablet 100 mg  100 mg Oral HS    gabapentin (NEURONTIN) capsule 600 mg  600 mg Oral TID    ketorolac (TORADOL) injection 30 mg  30 mg Intravenous Q12H [address]    lidocaine (LIDODERM) 5 % patch 1 patch  1 patch Topical Daily    LORazepam (ATIVAN) tablet 2 mg  2 mg Oral TID    magnesium sulfate 2 g/50 mL IVPB (premix) 2 g  2 g Intravenous Q24H KIRSTIE    meclizine (ANTIVERT) tablet 12 5 mg  12 5 mg Oral Q8H [address]    mexiletine (MEXITIL) capsule 150 mg  150 mg Oral TID    ondansetron (ZOFRAN) injection 4 mg  4 mg Intravenous Q6H PRN    pantoprazole (PROTONIX) EC tablet 40 mg  40 mg Oral Early Morning    valproate (DEPACON) 1,000 mg in sodium chloride 0 9 % 50 mL IVPB  1,000 mg Intravenous Q24H [address]    zonisamide (ZONEGRAN) capsule 200 mg  200 mg Oral Daily    and PTA meds:   Prior to Admission Medications   Prescriptions Last Dose Informant Patient Reported? Taking? ARIPiprazole (ABILIFY) 5 mg tablet   No Yes   Sig: Take 1 tablet by mouth daily   Patient taking differently: Take 15 mg by mouth daily     Erenumab-aooe (AIMOVIG) 140 MG/ML SOAJ   Yes No   Sig: Inject 140 mg under the skin every 30 (thirty) days    LORazepam (ATIVAN) 1 mg tablet   Yes Yes   Sig: Take 2 mg by mouth 3 (three) times a day     acetaminophen (TYLENOL) 500 mg tablet   No Yes   Sig: Take 1 tablet (500 mg total) by mouth every 6 (six) hours as needed (pain)   apixaban (ELIQUIS) 5 mg   Yes Yes   Sig: Take 5 mg by mouth 2 (two) times a day   cholecalciferol (VITAMIN D3) 1,000 units tablet   Yes Yes   Sig: Take 5,000 Units by mouth daily   eszopiclone (LUNESTA) 3 MG tablet   Yes Yes   Sig: Take 3 mg by mouth daily   ferrous sulfate 325 (65 Fe) mg tablet   Yes Yes   Sig: Take 325 mg by mouth daily with breakfast   fluvoxaMINE (LUVOX) 100 mg tablet   Yes Yes   Sig: Take 300 mg by mouth daily at bedtime     gabapentin (NEURONTIN) 600 MG tablet   Yes Yes   Sig: Take 600 mg by mouth 3 (three) times a day    haloperidol (HALDOL) 5 mg tablet   Yes Yes   Sig: Take one half tab orally twice a day as needed for headache or nausea  Max 2 tabs/day, 3 days/week   lamoTRIgine (LaMICtal) 200 MG tablet   Yes Yes   Sig: Take 200 mg by mouth daily     mexiletine (MEXITIL) 150 mg capsule   Yes Yes   Sig: Take 150 mg by mouth 3 (three) times a day   zonisamide (ZONEGRAN) 50 MG capsule  Self No Yes   Sig: Take 1 capsule by mouth daily   Patient taking differently: Take 200 mg by mouth daily       Facility-Administered Medications: None       Allergies   Allergen Reactions    Ketorolac Itching and Other (See Comments)     [toradol] Itching, hives    Mushroom Extract Complex        Objective   Vitals:Blood pressure 123/66, pulse 93, temperature 98 4 °F (36 9 °C), temperature source Temporal, resp  rate 20, height 5' 8" (1 727 m), weight 121 kg (266 lb 15 6 oz), SpO2 99 %  ,Body mass index is 40 59 kg/m²  Intake/Output Summary (Last 24 hours) at 10/4/2019 1356  Last data filed at 10/4/2019 0738  Gross per 24 hour   Intake 360 ml   Output    Net 360 ml       Invasive Devices: Invasive Devices     Peripheral Intravenous Line            Peripheral IV 10/02/19 Left Hand 1 day                Physical Exam   Constitutional: She is oriented to person, place, and time  She appears well-developed and well-nourished  No distress  Patient does not appear in any acute distress  She is seen sitting in a dark room with her sunglasses on, yet does not appear photophobic when a pen light is shined directly in her eye to evaluate her pupils  HENT:   Head: Normocephalic and atraumatic  Right Ear: External ear normal    Left Ear: External ear normal    Mouth/Throat: Oropharynx is clear and moist    Eyes: Pupils are equal, round, and reactive to light  Conjunctivae and EOM are normal  Right eye exhibits no discharge  Left eye exhibits no discharge  No scleral icterus  Neck: Normal range of motion  Neck supple  Cardiovascular: Normal rate, regular rhythm and normal heart sounds  Exam reveals no gallop and no friction rub  No murmur heard  Pulmonary/Chest: Effort normal and breath sounds normal  No stridor  No respiratory distress  She has no wheezes  Abdominal: Soft  Musculoskeletal: Normal range of motion  She exhibits no edema, tenderness or deformity  Neurological: She is alert and oriented to person, place, and time  She has normal strength  A sensory deficit is present  No cranial nerve deficit  She has a normal Finger-Nose-Finger Test    Reflex Scores:       Tricep reflexes are 2+ on the right side and 2+ on the left side  Bicep reflexes are 2+ on the right side and 2+ on the left side         Brachioradialis reflexes are 2+ on the right side and 2+ on the left side  Patellar reflexes are 2+ on the right side and 1+ on the left side  Achilles reflexes are 0 on the right side and 0 on the left side  Skin: Skin is warm and dry  No rash noted  She is not diaphoretic  No erythema  Surgical scar noted on left knee  Psychiatric: She has a normal mood and affect  Her behavior is normal  Judgment and thought content normal    Nursing note and vitals reviewed  Neurologic Exam     Mental Status   Oriented to person, place, and time  Level of consciousness: alert  Able to name object  Follows all commands without difficulty  No dysarthria or aphasia  Cranial Nerves     CN II   Visual fields full to confrontation  Visual acuity: (Grossly intact)    CN III, IV, VI   Pupils are equal, round, and reactive to light  Extraocular motions are normal    Right pupil: Shape: regular  Left pupil: Shape: regular  Nystagmus: none   Conjugate gaze: present    CN V   Right facial sensation deficit: complete (Decreased sensation)    CN VII   Facial expression full, symmetric  CN VIII   Hearing: intact    CN XI   Right sternocleidomastoid strength: normal  Left sternocleidomastoid strength: normal  Right trapezius strength: normal  Left trapezius strength: normal    CN XII   Tongue: not atrophic  Fasciculations: absent  Tongue deviation: none    Motor Exam   Muscle bulk: normal  Overall muscle tone: normal    Strength   Strength 5/5 throughout  Sensory Exam   Decreased sensation of right side of face, right arm, and right leg to light touch, vibration, and temperature         Gait, Coordination, and Reflexes     Gait  Gait: (Deferred)    Coordination   Finger to nose coordination: normal    Tremor   Resting tremor: absent  Intention tremor: absent  Action tremor: absent    Reflexes   Right brachioradialis: 2+  Left brachioradialis: 2+  Right biceps: 2+  Left biceps: 2+  Right triceps: 2+  Left triceps: 2+  Right patellar: 2+  Left patellar: 1+  Right achilles: 0  Left achilles: 0      Lab Results: I have personally reviewed pertinent reports  Imaging Studies: I have personally reviewed pertinent reports  and I have personally reviewed pertinent films in PACS  EKG, Pathology, and Other Studies: I have personally reviewed pertinent reports      VTE Prophylaxis: Eliquis    Code Status: Level 1 - Full Code

## 2019-10-04 NOTE — OCCUPATIONAL THERAPY NOTE
[address]  Evaluation     Patient Name: Tim Lacey  MRCOO'R Date: 10/4/2019  Problem List  Principal Problem:    Dizziness  Active Problems:    Bipolar disorder    Chronic anticoagulation    Intractable migraine without aura and without status migrainosus    Past Medical History  Past Medical History:   Diagnosis Date    Adrenal insufficiency ([place]'s disease) ([facility] [address]     Bipolar disorder     Cervical radiculopathy     Chronic back pain     DVT, lower extremity ([facility] [address] 1991    Fibromyalgia     History of TIAs     cannot remember details    Hypertension     Hypokalemia     Migraine     MRSA (methicillin resistant Staphylococcus aureus) 07/20/2012    nasal swab negative 4/5/19    Psychiatric disorder     Anxiety, major depression, bipolar    Spinal stenosis     Syncope 2014    orthostatic hypotension     Past Surgical History  Past Surgical History:   Procedure Laterality Date    APPENDECTOMY      GASTRIC RESTRICTION SURGERY      Gastric Sleeve Nov 2015    HYSTERECTOMY      JOINT REPLACEMENT      Left Knee 2011 and Right Hip 2010           10/04/19 8904   Note Type   Note type Eval only   Restrictions/Precautions   Weight Bearing Precautions Per Order No   Other Precautions Multiple lines; Fall Risk;Pain   Pain Assessment   Pain Assessment 0-10   Pain Score 9   Pain Type Acute pain   Pain Location Head   Pain Orientation Bilateral   Hospital Pain Intervention(s) Repositioned; Ambulation/increased activity; Emotional support; Rest   Response to Interventions Tolerated OT   Multiple Pain Sites No   Home Living   Type of Home House   Home Layout Multi-level; Able to live on main level with bedroom/bathroom; Performs ADLs on one level;Stairs to enter with rails  (3 BECCA, 1st floor setup)   Bathroom Shower/Tub Walk-in shower   Bathroom Toilet Standard   Bathroom Equipment Grab bars in shower; Shower chair;Commode   Bathroom Accessibility Accessible   Home Equipment Walker;Cane   Additional Comments Pt lives with spouse in a multi-level house with 3 BECCA and 1st floor setup  Pt (+) home alone during the day  Prior Function   Level of [place] Independent with ADLs and functional mobility   Lives With Spouse   Receives Help From Family   ADL Assistance Independent   IADLs Independent   Falls in the last 6 months 5 to 10  (unable to report specific number)   Vocational On disability   Comments At baseline, pt was I w/ ADLs, IADLs, and functional mobility/transfers w/ use of RW as needed, (+) , and (+) falls PTA  Lifestyle   Autonomy At baseline, pt was I w/ ADLs, IADLs, and functional mobility/transfers w/ use of RW as needed, (+) , and (+) falls PTA  Reciprocal Relationships Lives with spouse   Service to Others On disability   Intrinsic Gratification Cleaning   Psychosocial   Psychosocial (WDL) WDL   ADL   Where Assessed Edge of bed   Eating Assistance 5  Supervision/Setup   Grooming Assistance 5  Supervision/Setup   UB Bathing Assistance 5  Supervision/Setup   LB Bathing Assistance 4  Minimal Assistance   UB Dressing Assistance 5  Supervision/Setup   LB Dressing Assistance 4  Minimal [address]  5  Supervision/Setup   Functional Assistance 4  Minimal Assistance   Bed Mobility   Supine to Sit 5  Supervision   Additional items HOB elevated; Bedrails; Increased time required   Sit to Supine 5  Supervision   Additional items Increased time required   Additional Comments BP lying supine: 121/67, seated EOB: 127/89  Pt lying supine at end of session with call bell and phone within reach   All needs met and pt reports no further questions for OT at this time   Transfers   Sit to Stand 5  Supervision   Additional items Increased time required;Verbal cues   Stand to Sit 5  Supervision   Additional items Increased time required;Verbal cues   Additional Comments BP upon standin/66   Functional Mobility   Functional Mobility 5  Supervision   Additional Comments Assist x1   Additional items Rolling walker   Balance   Static Sitting Good   Dynamic Sitting Fair +   Static Standing Fair +   Dynamic Standing Fair   Ambulatory Fair   Activity Tolerance   Activity Tolerance Patient limited by fatigue;Patient limited by pain   Medical Staff [address], PT   Nurse Made Aware yes; Naye Ordonez RN   RUE Assessment   RUE Assessment WFL   RUE Strength   RUE Overall Strength Within Functional Limits - able to perform ADL tasks with strength  (4/5 throughout)   LUE Assessment   LUE Assessment WFL   LUE Strength   LUE Overall Strength Within Functional Limits - able to perform ADL tasks with strength  (4/5 throughout)   Hand Function   Gross Motor Coordination Functional   Fine Motor Coordination Functional   Sensation   Light Touch No apparent deficits   Proprioception   Proprioception No apparent deficits   Vision - Complex Assessment   Ocular Range of Motion WFL   Acuity Able to read clock/calendar on wall without difficulty   Perception   Inattention/Neglect Appears intact   Cognition   Overall Cognitive Status [facility]   Arousal/Participation Alert; Cooperative   Attention Within functional limits   Orientation Level Oriented X4   Memory Within functional limits   Following Commands Follows multistep commands with increased time or repetition   Assessment   Limitation Decreased ADL status; Decreased UE strength;Decreased endurance;Decreased self-care trans;Decreased high-level ADLs   Prognosis Good   Assessment Pt is a 59 y o  female seen for OT evaluation s/p adm to [facility]  on 10/2/2019 w/ three syncopal episodes and dizziness  CT Head and CT cervical spine (-) for acute findings  Comorbidities affecting pts functional performance include a significant PMH of Bipolar disorder, Cervical radiculopathy, DVT, Fibromyalgia, HTN, Migraine, MRSA, spinal stenosis, and syncope  Pt with active OT orders and activity orders for Up with assistance   Pt lives with spouse in a multi-level house with 3 BECCA and 1st floor setup  Pt (+) home alone during the day  At baseline, pt was I w/ ADLs, IADLs, and functional mobility/transfers w/ use of RW as needed, (+) , and (+) falls PTA  Upon evaluation, pt currently requires Min A-Supervision for overall ADLs, Supervision for bed mobility, and Supervision for functional mobility/transfers 2* the following deficits impacting occupational performance: weakness, decreased strength, decreased balance, decreased tolerance and decreased safety awareness  These impairments, as well at pts steps to enter environment, limited home support, difficulty performing ADLS and difficulty performing IADLS  limit pts ability to safely engage in all baseline areas of occupation  Pt scored overall 60/100 on the Barthel Index  Based on the aforementioned OT evaluation, functional performance deficits, and assessments, pt has been identified as a Moderate complexity evaluation  Pt to continue to benefit from continued acute OT services during hospital stay to address defined deficits and to maximize level of functional independence in the following Occupational Performance areas: grooming, bathing/shower, toilet hygiene, dressing, medication management, health maintenance, functional mobility, community mobility, clothing management, cleaning, meal prep and household maintenance  From OT standpoint, recommend Home OT upon D/C  OT will continue to follow pt 3-5x/wk to address the following goals to  w/in 10/14 days:   Goals   Patient Goals To have less pain   LTG Time Frame 10-14   Long Term Goal Please refer to LTGs listed below   Plan   Treatment Interventions ADL retraining;Functional transfer training;UE strengthening/ROM; Endurance training;Patient/family training;Equipment evaluation/education; Compensatory technique education; Energy conservation; Activityengagement   Goal Expiration Date 10/18/19   OT Treatment Day 0   OT Frequency 3-5x/wk   Recommendation   OT Discharge Recommendation Home OT   OT - OK to Discharge Yes  (when medically cleared)   Barthel Index   Feeding 10   Bathing 0   Grooming Score 5   Dressing Score 5   Bladder Score 10   Bowels Score 10   Toilet Use Score 10   Transfers (Bed/Chair) Score 10   Mobility (Level Surface) Score 0   Stairs Score 0   Barthel Index Score 60   Modified [place] Scale   Modified [place] Scale 3        GOALS    1) Pt will improve activity tolerance to G for min 30 min txment sessions for increase engagement in functional tasks    2) Pt will complete UB/LB dressing/self care w/ mod I using adaptive device and DME as needed    3) Pt will complete bathing w/ Mod I w/ use of AE and DME as needed    4) Pt will complete toileting w/ mod I w/ G hygiene/thoroughness using DME as needed    5) Pt will improve functional transfers to Mod I on/off all surfaces using DME as needed w/ G balance/safety     6) Pt will improve functional mobility during ADL/IADL/leisure tasks to Mod I using DME as needed w/ G balance/safety     7) Pt will participate in simulated IADL management task to increase independence to Mod I w/ G safety and endurance    8) Pt will be attentive 100% of the time during ongoing cognitive assessment w/ G participation to assist w/ safe d/c planning/recommendations    9) Pt will demonstrate G carryover of pt/caregiver education and training as appropriate w/o cues w/ good tolerance to increase safety during functional tasks    10) Pt will demonstrate 100% carryover of energy conservation techniques t/o functional I/ADL/leisure tasks w/o cues s/p skilled education to increase endurance during functional tasks         Harrison Tay, OTR/L normal...

## 2019-10-04 NOTE — PHYSICAL THERAPY NOTE
Physical Therapy Evaluation    Patient Name: Doc Santana    GFIWJ'U Date: 10/4/2019     Problem List  Principal Problem:    Dizziness  Active Problems:    Bipolar disorder    Chronic anticoagulation    Intractable migraine without aura and without status migrainosus       Past Medical History  Past Medical History:   Diagnosis Date    Adrenal insufficiency (Braddock's disease) ([facility] [address] )     Bipolar disorder     Cervical radiculopathy     Chronic back pain     DVT, lower extremity ([facility] ) 1991    Fibromyalgia     History of TIAs     cannot remember details    Hypertension     Hypokalemia     Migraine     MRSA (methicillin resistant Staphylococcus aureus) 07/20/2012    nasal swab negative 4/5/19    Psychiatric disorder     Anxiety, major depression, bipolar    Spinal stenosis     Syncope 2014    orthostatic hypotension        Past Surgical History  Past Surgical History:   Procedure Laterality Date    APPENDECTOMY      GASTRIC RESTRICTION SURGERY      Gastric Sleeve Nov 2015    HYSTERECTOMY      JOINT REPLACEMENT      Left Knee 2011 and Right Hip 2010           10/04/19 4115   Note Type   Note type Eval only   Pain Assessment   Pain Assessment 0-10   Pain Score 9   Pain Type Acute pain   Pain Location Head   Pain Orientation Bilateral   Hospital Pain Intervention(s) Repositioned; Ambulation/increased activity   Response to Interventions tolerated   Home Living   Type of Home House   Home Layout Multi-level; Able to live on main level with bedroom/bathroom;Stairs to enter with rails  (3 story home, 3 BECCA)   Home Equipment Walker;Cane   Prior Function   Level of [place] Independent with ADLs and functional mobility; Needs assistance with IADLs   Lives With Spouse   Receives Help From Family   ADL Assistance Independent   IADLs Needs assistance   Falls in the last 6 months 5 to 10  (pt unable to report specific number)   Vocational On disability Restrictions/Precautions   Weight Bearing Precautions Per Order No   Other Precautions Multiple lines; Fall Risk;Pain   General   Family/Caregiver Present No   Cognition   Overall Cognitive Status WFL   Arousal/Participation Cooperative   Orientation Level Oriented X4   Following Commands Follows multistep commands with increased time or repetition   RUE Assessment   RUE Assessment WFL   LUE Assessment   LUE Assessment WFL   RLE Assessment   RLE Assessment WFL   LLE Assessment   LLE Assessment WFL   Coordination   Movements are Fluid and Coordinated 0   Coordination and Movement Description excessively slow movements   Bed Mobility   Supine to Sit 5  Supervision   Additional items Increased time required   Sit to Supine 5  Supervision   Additional items Increased time required   Transfers   Sit to Stand 5  Supervision   Additional items Increased time required;Verbal cues   Stand to Sit 5  Supervision   Additional items Increased time required;Verbal cues   Ambulation/Elevation   Gait pattern Improper Weight shift;Narrow ZEESHAN; Decreased foot clearance; Inconsistent gil; Short stride; Excessively slow   Gait Assistance 5  Supervision   Additional items Verbal cues   Assistive Device Rolling walker   Distance 15' x 2  (distance limited by pt request)   Stair Management Assistance Not tested   Balance   Static Sitting Good   Dynamic Sitting Fair +   Static Standing Fair +  (RW)   Dynamic Standing Fair  (RW)   Ambulatory Fair  (RW)   Endurance Deficit   Endurance Deficit Yes   Endurance Deficit Description weakness, fatigue, dizziness   Activity Tolerance   Activity Tolerance Patient limited by fatigue;Patient limited by pain  (migraine)   Medical Staff Made Aware DWAYNE Betts   Nurse Made Aware RN cleared patient for PT evaluation   Assessment   Prognosis Good   Problem List Decreased endurance; Impaired balance;Decreased mobility; Decreased coordination;Pain   Assessment Pt is a 59 y o  female seen for PT evaluation s/p admit to [facility] on 10/2/19  Two pt identifiers were used to confirm  Pt presented with dizziness and frequent syncope episodes on 10/2/19  Pt was admitted with a primary dx of: syncope  PT now consulted for assessment of mobility and d/c needs  Pts current co morbidities effecting treatment include: dizziness, bipolar disorder, intractable migraine, Rector's disease, fibromyalgia OA, anxiety and personal factors including 3 BECCA home environment  Pt currently lives in a three story home with a first floor setup with her   Pts current clinical presentation is Unstable/ Unpredictable (high complexity) due to Ongoing medical management for primary dx, decreased activity tolerance compared to baseline, fall risk, increased assistance needed from caregiver at current time, continuous pulse oximetry monitoring, multiple lines, decline in overall functional mobility status  Prior to admission, pt was mobilizing with the use of a RW or SPC as per pt  Upon evaluation, pt currently is requiring supervision assist for bed mobility; supervision assist for transfers and supervision assist for ambulation w/ RW  Pt displays decreased step and stride length, inconsistent gil, decreased gait speed, improper weight shift  Pt presents at PT eval functioning below baseline and currently w/ overall mobility deficits secondary to: decreased endurance, impaired balance, impaired coordination, pain  Pt currently at a fall risk secondary to impairments listed above  Based on PT evaluation, pt will continue to benefit from skilled acute PT interventions to address stated impairments; to maximize functional mobility; for ongoing pt/ family training; and DME needs  At conclusion of PT session pt was left supine in bed, all needs within reach  Provided pt education regarding PT plan to improve functional mobility status  PT is currently recommending home with family support and home PT    Pt agreeable to plan and goals as stated on evaluation  PT will continue to follow during hospital stay  Goals   Patient Goals have less pain   STG Expiration Date 10/18/19   Short Term Goal #1 1  Pt will increased strength by 1 grade in order to increase functional independence with transfers  2  Pt will increase balance grade by 1 in order to improve safety  3  Pt will improve transfer ability to mod I to increase functional independence and decrease caregiver burden  4  Pt will perform bed mobility independently to decrease caregiver burden  5  Pt will be able to amb 150 ft with RW and mod I to increase functional independence in home and community environments  6  Pt will be able to ascend and descend 3 stairs mod I to increase functional independence within the home environment  PT Treatment Day 0   Plan   Treatment/Interventions Functional transfer training;LE strengthening/ROM; Elevations; Therapeutic exercise; Endurance training;Patient/family training;Equipment eval/education; Bed mobility;Gait training; Compensatory technique education;Spoke to nursing;OT   PT Frequency Other (Comment)  (3-5x/wk)   Recommendation   Recommendation Home with family support;Home PT   Equipment Recommended Walker  (pt reports she owns RW)   PT - OK to Discharge Yes   Additional Comments when medically stable   Modified Neris Scale   Modified Crawford Scale 3   Barthel Index   Feeding 10   Bathing 0   Grooming Score 5   Dressing Score 10   Bladder Score 10   Bowels Score 10   Toilet Use Score 10   Transfers (Bed/Chair) Score 10   Mobility (Level Surface) Score 0   Stairs Score 0   Barthel Index Score 65       Nima Last PT, DPT

## 2019-10-04 NOTE — ASSESSMENT & PLAN NOTE
· On Aimovig and Botox injections outpatient  · Continue Zonegran  · Patient reports improvement, temporarily with Toradol   · Continue Protonix for GI protection especially with history of gastric sleeve  · S/P magnesium sulfate 2 g IV x1  · Neurology consult pending

## 2019-10-05 LAB — ERYTHROCYTE [SEDIMENTATION RATE] IN BLOOD: 24 MM/HOUR (ref 0–20)

## 2019-10-05 PROCEDURE — 97530 THERAPEUTIC ACTIVITIES: CPT

## 2019-10-05 PROCEDURE — 99232 SBSQ HOSP IP/OBS MODERATE 35: CPT | Performed by: INTERNAL MEDICINE

## 2019-10-05 PROCEDURE — 80175 DRUG SCREEN QUAN LAMOTRIGINE: CPT | Performed by: PHYSICIAN ASSISTANT

## 2019-10-05 PROCEDURE — 85652 RBC SED RATE AUTOMATED: CPT | Performed by: PHYSICIAN ASSISTANT

## 2019-10-05 PROCEDURE — 97535 SELF CARE MNGMENT TRAINING: CPT

## 2019-10-05 RX ORDER — LAMOTRIGINE 100 MG/1
200 TABLET ORAL DAILY
Status: DISCONTINUED | OUTPATIENT
Start: 2019-10-05 | End: 2019-10-07 | Stop reason: HOSPADM

## 2019-10-05 RX ADMIN — MAGNESIUM SULFATE HEPTAHYDRATE 2 G: 40 INJECTION, SOLUTION INTRAVENOUS at 08:36

## 2019-10-05 RX ADMIN — MECLIZINE 12.5 MG: 12.5 TABLET ORAL at 21:11

## 2019-10-05 RX ADMIN — LAMOTRIGINE 200 MG: 100 TABLET ORAL at 16:40

## 2019-10-05 RX ADMIN — MECLIZINE 12.5 MG: 12.5 TABLET ORAL at 05:50

## 2019-10-05 RX ADMIN — GABAPENTIN 600 MG: 300 CAPSULE ORAL at 08:32

## 2019-10-05 RX ADMIN — DIPHENHYDRAMINE HYDROCHLORIDE 25 MG: 50 INJECTION, SOLUTION INTRAMUSCULAR; INTRAVENOUS at 08:40

## 2019-10-05 RX ADMIN — LORAZEPAM 2 MG: 1 TABLET ORAL at 21:11

## 2019-10-05 RX ADMIN — LIDOCAINE 1 PATCH: 50 PATCH TOPICAL at 08:33

## 2019-10-05 RX ADMIN — LORAZEPAM 2 MG: 1 TABLET ORAL at 16:40

## 2019-10-05 RX ADMIN — KETOROLAC TROMETHAMINE 30 MG: 30 INJECTION, SOLUTION INTRAMUSCULAR at 08:33

## 2019-10-05 RX ADMIN — ARIPIPRAZOLE 5 MG: 5 TABLET ORAL at 08:34

## 2019-10-05 RX ADMIN — FLUVOXAMINE MALEATE 100 MG: 50 TABLET, FILM COATED ORAL at 21:11

## 2019-10-05 RX ADMIN — LORAZEPAM 2 MG: 1 TABLET ORAL at 08:32

## 2019-10-05 RX ADMIN — GABAPENTIN 600 MG: 300 CAPSULE ORAL at 16:40

## 2019-10-05 RX ADMIN — PANTOPRAZOLE SODIUM 40 MG: 40 TABLET, DELAYED RELEASE ORAL at 05:50

## 2019-10-05 RX ADMIN — MEXILETINE HYDROCHLORIDE 150 MG: 150 CAPSULE ORAL at 21:11

## 2019-10-05 RX ADMIN — MEXILETINE HYDROCHLORIDE 150 MG: 150 CAPSULE ORAL at 08:35

## 2019-10-05 RX ADMIN — APIXABAN 5 MG: 5 TABLET, FILM COATED ORAL at 17:51

## 2019-10-05 RX ADMIN — MECLIZINE 12.5 MG: 12.5 TABLET ORAL at 13:37

## 2019-10-05 RX ADMIN — ONDANSETRON 4 MG: 2 INJECTION INTRAMUSCULAR; INTRAVENOUS at 20:15

## 2019-10-05 RX ADMIN — ACETAMINOPHEN 650 MG: 325 TABLET ORAL at 13:38

## 2019-10-05 RX ADMIN — MEXILETINE HYDROCHLORIDE 150 MG: 150 CAPSULE ORAL at 16:40

## 2019-10-05 RX ADMIN — ACETAMINOPHEN 650 MG: 325 TABLET ORAL at 05:53

## 2019-10-05 RX ADMIN — ZONISAMIDE 200 MG: 100 CAPSULE ORAL at 08:34

## 2019-10-05 RX ADMIN — ONDANSETRON 4 MG: 2 INJECTION INTRAMUSCULAR; INTRAVENOUS at 08:40

## 2019-10-05 RX ADMIN — GABAPENTIN 600 MG: 300 CAPSULE ORAL at 21:11

## 2019-10-05 RX ADMIN — APIXABAN 5 MG: 5 TABLET, FILM COATED ORAL at 08:32

## 2019-10-05 RX ADMIN — VALPROATE SODIUM 1000 MG: 100 INJECTION, SOLUTION INTRAVENOUS at 08:36

## 2019-10-05 NOTE — PROGRESS NOTES
Progress Note - Mansoor Brown 1960, 59 y o  female MRN: [id number]    Unit/Bed#: E4 -01 Encounter: [id number]    Primary Care Provider: Ricardo Escamilla DO   Date and time admitted to hospital: 10/2/2019  2:26 PM        Intractable migraine without aura and without status migrainosus  Assessment & Plan  · On Aimovig and Botox injections outpatient  · Continue Zonegran and lamictal  · Patient reports improvement, with mag sulfate and depacon infusions  · DC toradol      * Dizziness  Assessment & Plan  · Migrainous versus polypharmacy   · Patient reports that dizziness always occurs when she gets migraine the exacerbation  · She requires multiple potentially sedating medication for her neuropathic pain/bipolar disorder/migraine    Chronic anticoagulation  Assessment & Plan  · Due to history of DVT  · Continue Eliquis 5 mg BID    Bipolar disorder  Assessment & Plan  · On several psychiatric medications as an outpatient  · Continue psychiatric medications: Abilify 5 mg daily, Luvox 100 mg daily, Neurontin 600 mg TID with caution  VTE Pharmacologic Prophylaxis:   Pharmacologic: Apixaban (Eliquis)  Mechanical VTE Prophylaxis in Place: No    Patient Centered Rounds: I have performed bedside rounds with nursing staff today  Time Spent for Care: 20 minutes  More than 50% of total time spent on counseling and coordination of care as described above      Current Length of Stay: 2 day(s)    Current Patient Status: Inpatient     Certification Statement: The patient will continue to require additional inpatient hospital stay due to intractable headache    Discharge Plan: in 24 hours    Code Status: Level 1 - Full Code      Subjective:   Improving headache and dizziness  Still needs her sunglasses inside her room    Objective:     Vitals:   Temp (24hrs), Av 7 °F (36 5 °C), Min:97 °F (36 1 °C), Max:98 6 °F (37 °C)    Temp:  [97 °F (36 1 °C)-98 6 °F (37 °C)] 97 6 °F (36 4 °C)  HR:  [64-89] 73  Resp: [19-20] 20  BP: (123-159)/(58-75) 123/60  SpO2:  [93 %-97 %] 97 %  Body mass index is 40 59 kg/m²  Input and Output Summary (last 24 hours):     No intake or output data in the 24 hours ending 10/05/19 1512    Physical Exam:     Physical Exam   Constitutional: She is oriented to person, place, and time  She appears well-developed  No distress  HENT:   Head: Normocephalic and atraumatic  Eyes: No scleral icterus  Neck: No JVD present  Cardiovascular: Regular rhythm  Exam reveals no gallop and no friction rub  No murmur heard  Pulmonary/Chest: Effort normal  No stridor  No respiratory distress  She has no wheezes  Abdominal: Soft  She exhibits no distension  There is no tenderness  There is no guarding  Musculoskeletal: She exhibits no edema or deformity  Neurological: She is alert and oriented to person, place, and time  Skin: Skin is warm and dry  She is not diaphoretic  Psychiatric: She has a normal mood and affect  Her behavior is normal        Additional Data:     Labs:    Results from last 7 days   Lab Units 10/03/19  0542   WBC Thousand/uL 6 38   HEMOGLOBIN g/dL 11 4*   HEMATOCRIT % 36 1   PLATELETS Thousands/uL 269   NEUTROS PCT % 56   LYMPHS PCT % 29   MONOS PCT % 11   EOS PCT % 2     Results from last 7 days   Lab Units 10/03/19  0542   SODIUM mmol/L 145   POTASSIUM mmol/L 3 6   CHLORIDE mmol/L 111*   CO2 mmol/L 25   BUN mg/dL 15   CREATININE mg/dL 0 73   ANION GAP mmol/L 9   CALCIUM mg/dL 8 8   GLUCOSE RANDOM mg/dL 86         Results from last 7 days   Lab Units 10/03/19  0756   POC GLUCOSE mg/dl 90             * I Have Reviewed All Lab Data Listed Above    * Additional Pertinent Lab Tests Reviewed: No new labs    Imaging:    Imaging Reports Reviewed Today Include: none      Recent Cultures (last 7 days):           Last 24 Hours Medication List:     Current Facility-Administered Medications:  acetaminophen 650 mg Oral Q6H PRN Alexander Sim PA-C    apixaban 5 mg Oral BID Aidan Nino SIRISHA Carey    ARIPiprazole 5 mg Oral Daily Gilda Delacruz PA-C    diphenhydrAMINE 25 mg Intravenous Q8H PRN Brooklynn Ray MD    fluvoxaMINE 100 mg Oral HS John Hamilton PA-C    gabapentin 600 mg Oral TID John Hamilton PA-C    lamoTRIgine 200 mg Oral Daily Brooklynn Ray MD    lidocaine 1 patch Topical Daily Gilda Delacruz PA-C    LORazepam 2 mg Oral TID Gilda Carey PA-C    magnesium sulfate 2 g Intravenous Q24H [facility] Ludmila Garner PA-C    meclizine 12 5 mg Oral [facility] Gilda Delacruz PA-C    mexiletine 150 mg Oral TID Gilda Carey PA-C    ondansetron 4 mg Intravenous Q6H PRN Gilda Carey PA-C    pantoprazole 40 mg Oral Early Morning Brooklynn Ray MD    valproate (DEPACON) IVPB 1,000 mg Intravenous Q24H [facility] Ludmila Garner PA-C Last Rate: 1,000 mg (10/05/19 0836)   zonisamide 200 mg Oral Daily John Hamilton PA-C         Today, Patient Was Seen By: Brooklynn Ray MD    ** Please Note: Dictation voice to text software may have been used in the creation of this document   **

## 2019-10-05 NOTE — ASSESSMENT & PLAN NOTE
· Migrainous versus polypharmacy   · Patient reports that dizziness always occurs when she gets migraine the exacerbation  · She requires multiple potentially sedating medication for her neuropathic pain/bipolar disorder/migraine

## 2019-10-05 NOTE — OCCUPATIONAL THERAPY NOTE
Occupational Therapy Treatment Note:         10/05/19 2043   Restrictions/Precautions   Weight Bearing Precautions Per Order No   Other Precautions Chair Alarm; Bed Alarm;Pain; Fall Risk;Multiple lines   Pain Assessment   Pain Assessment 0-10   Pain Score 4   Pain Location Head   Pain Orientation Bilateral   ADL   Where Assessed Chair   Grooming Assistance 5  Supervision/Setup   Grooming Deficit Setup   LB Dressing Assistance 3  Moderate Assistance   LB Dressing Deficit Setup;Steadying; Requires assistive device for steadying; Increased time to complete;Supervision/safety;Verbal cueing; Don/doff L sock; Don/doff R sock;Pull up over hips; Thread LLE into underwear; Thread RLE into underwear   LB Dressing Comments Pt with limited functional reach to BLE  May benefit from continued use of LHAE  Toileting Assistance  Unable to assess   Functional Standing Tolerance   Time 5 mins   Activity dynamic stand balance activity  Comments Increased fatigue noted with activity   Transfers   Sit to Stand 4  Minimal assistance   Additional items Assist x 1; Armrests; Increased time required;Verbal cues   Stand to Sit 4  Minimal assistance   Additional items Assist x 1; Increased time required;Verbal cues;Armrests   Stand pivot 4  Minimal assistance   Additional items Assist x 1; Armrests; Increased time required;Verbal cues   Additional Comments early release of RW noted with activities  Functional Mobility   Functional Mobility 4  Minimal assistance   Additional Comments x1   Additional items Rolling walker   Cognition   Overall Cognitive Status WFL   Arousal/Participation Alert; Cooperative   Attention Attends with cues to redirect   Orientation Level Oriented X4   Memory Within functional limits   Following Commands Follows multistep commands with increased time or repetition   Comments Pt will benefit from further review of fall prevention and RW safety      Additional Activities   Additional Activities Other (Comment)  (reviewed fall prevention/home safety  )   Additional Activities Comments Pt reports having F understanding a may benefit from continued pursuit of a medical alert system   Activity Tolerance   Activity Tolerance Patient limited by fatigue;Patient limited by pain   Medical Staff Made Aware Reported Pt's pain levels in head to RN  Pt continued with headache during tx session   Assessment   Assessment Pt was seen for skilled OT with focus on completion of dynamic stand balance activity, LE dressing routine, review of home safety/fallprevention, balance retraining activity and review of current plan of care  See above levels of A required for all functional tasks  Pt wearing dark sun glasses upon greeting this tx session while seated in bedside chair  Mod A overall for LE dressing due to limited functional reach  Pt stated, "I have a bad hip"  Pt reports having LHAE at home and is familiar with its usage  Extensive review of RW safety and need for appropriate positioning inside RW perimeter with functional tasks was required to achieve safety with dynamic stand balance activity  Pt with shuffling steps taken with inconsistent support of RW noted  Encouraged Pt to remain with inside RW perimeter and to step inside RW perimeter rather than continue taking steps with BUE fully extended with forward flexed posture  Pt reports having F understanding of reviewed fall prevention checklist and reports that her daughter, "wants me to look into a life alert"  Pt may benefit from continued therapies with return to home environment as well as a home safety evaluation due to number of falls and need for consistency with RW usage/safety  Plan   Treatment Interventions ADL retraining;Functional transfer training; Endurance training;Cognitive reorientation   Goal Expiration Date 10/18/19   OT Treatment Day 1   OT Frequency 3-5x/wk   Recommendation   OT Discharge Recommendation Home OT   OT - OK to Discharge Yes  (when medically cleared) Barthel Index   Feeding 10   Bathing 0   Grooming Score 5   Dressing Score 5   Bladder Score 10   Bowels Score 10   Toilet Use Score 10   Transfers (Bed/Chair) Score 10   Mobility (Level Surface) Score 0   Stairs Score 0   Barthel Index Score 60   Modified [place] Scale   Modified Neris Scale 3   Heydi Miguel, [address]

## 2019-10-05 NOTE — PLAN OF CARE
Problem: OCCUPATIONAL THERAPY ADULT  Goal: Performs self-care activities at highest level of function for planned discharge setting  See evaluation for individualized goals  Description  Treatment Interventions: ADL retraining, Functional transfer training, UE strengthening/ROM, Endurance training, Patient/family training, Equipment evaluation/education, Compensatory technique education, Energy conservation, Activityengagement          See flowsheet documentation for full assessment, interventions and recommendations  Outcome: Progressing  Note:   Limitation: Decreased ADL status, Decreased UE strength, Decreased endurance, Decreased self-care trans, Decreased high-level ADLs  Prognosis: Good  Assessment: Pt was seen for skilled OT with focus on completion of dynamic stand balance activity, LE dressing routine, review of home safety/fallprevention, balance retraining activity and review of current plan of care  See above levels of A required for all functional tasks  Pt wearing dark sun glasses upon greeting this tx session while seated in bedside chair  Mod A overall for LE dressing due to limited functional reach  Pt stated, "I have a bad hip"  Pt reports having LHAE at home and is familiar with its usage  Extensive review of RW safety and need for appropriate positioning inside RW perimeter with functional tasks was required to achieve safety with dynamic stand balance activity  Pt with shuffling steps taken with inconsistent support of RW noted  Encouraged Pt to remain with inside RW perimeter and to step inside RW perimeter rather than continue taking steps with BUE fully extended with forward flexed posture  Pt reports having F understanding of reviewed fall prevention checklist and reports that her daughter, "wants me to look into a life alert"   Pt may benefit from continued therapies with return to home environment as well as a home safety evaluation due to number of falls and need for consistency with RW usage/safety        OT Discharge Recommendation: Home OT  OT - OK to Discharge: Yes(when medically cleared)

## 2019-10-05 NOTE — ASSESSMENT & PLAN NOTE
· On several psychiatric medications as an outpatient  · Continue psychiatric medications: Abilify 5 mg daily, Luvox 100 mg daily, Neurontin 600 mg TID with caution

## 2019-10-05 NOTE — ASSESSMENT & PLAN NOTE
· On Aimovig and Botox injections outpatient  · Continue Zonegran and lamictal  · Patient reports improvement, with mag sulfate and depacon infusions  · DC toradol

## 2019-10-06 LAB — LAMOTRIGINE SERPL-MCNC: 2.2 UG/ML (ref 2–20)

## 2019-10-06 PROCEDURE — 99232 SBSQ HOSP IP/OBS MODERATE 35: CPT | Performed by: INTERNAL MEDICINE

## 2019-10-06 RX ORDER — POLYETHYLENE GLYCOL 3350 17 G/17G
17 POWDER, FOR SOLUTION ORAL DAILY PRN
Status: DISCONTINUED | OUTPATIENT
Start: 2019-10-06 | End: 2019-10-07 | Stop reason: HOSPADM

## 2019-10-06 RX ORDER — SENNOSIDES 8.6 MG
2 TABLET ORAL DAILY PRN
Status: DISCONTINUED | OUTPATIENT
Start: 2019-10-06 | End: 2019-10-07 | Stop reason: HOSPADM

## 2019-10-06 RX ADMIN — VALPROATE SODIUM 1000 MG: 100 INJECTION, SOLUTION INTRAVENOUS at 08:17

## 2019-10-06 RX ADMIN — ONDANSETRON 4 MG: 2 INJECTION INTRAMUSCULAR; INTRAVENOUS at 09:03

## 2019-10-06 RX ADMIN — LORAZEPAM 2 MG: 1 TABLET ORAL at 16:48

## 2019-10-06 RX ADMIN — ARIPIPRAZOLE 5 MG: 5 TABLET ORAL at 08:17

## 2019-10-06 RX ADMIN — MEXILETINE HYDROCHLORIDE 150 MG: 150 CAPSULE ORAL at 16:48

## 2019-10-06 RX ADMIN — APIXABAN 5 MG: 5 TABLET, FILM COATED ORAL at 08:17

## 2019-10-06 RX ADMIN — POLYETHYLENE GLYCOL 3350 17 G: 17 POWDER, FOR SOLUTION ORAL at 05:06

## 2019-10-06 RX ADMIN — MECLIZINE 12.5 MG: 12.5 TABLET ORAL at 14:55

## 2019-10-06 RX ADMIN — LORAZEPAM 2 MG: 1 TABLET ORAL at 21:52

## 2019-10-06 RX ADMIN — GABAPENTIN 600 MG: 300 CAPSULE ORAL at 08:17

## 2019-10-06 RX ADMIN — MEXILETINE HYDROCHLORIDE 150 MG: 150 CAPSULE ORAL at 08:17

## 2019-10-06 RX ADMIN — LORAZEPAM 2 MG: 1 TABLET ORAL at 08:17

## 2019-10-06 RX ADMIN — MEXILETINE HYDROCHLORIDE 150 MG: 150 CAPSULE ORAL at 21:51

## 2019-10-06 RX ADMIN — SENNOSIDES 17.2 MG: 8.6 TABLET, FILM COATED ORAL at 05:07

## 2019-10-06 RX ADMIN — MECLIZINE 12.5 MG: 12.5 TABLET ORAL at 21:52

## 2019-10-06 RX ADMIN — ACETAMINOPHEN 650 MG: 325 TABLET ORAL at 21:52

## 2019-10-06 RX ADMIN — LIDOCAINE 1 PATCH: 50 PATCH TOPICAL at 08:17

## 2019-10-06 RX ADMIN — MECLIZINE 12.5 MG: 12.5 TABLET ORAL at 05:06

## 2019-10-06 RX ADMIN — APIXABAN 5 MG: 5 TABLET, FILM COATED ORAL at 17:17

## 2019-10-06 RX ADMIN — FLUVOXAMINE MALEATE 100 MG: 50 TABLET, FILM COATED ORAL at 21:51

## 2019-10-06 RX ADMIN — GABAPENTIN 600 MG: 300 CAPSULE ORAL at 21:51

## 2019-10-06 RX ADMIN — LAMOTRIGINE 200 MG: 100 TABLET ORAL at 08:17

## 2019-10-06 RX ADMIN — MAGNESIUM SULFATE HEPTAHYDRATE 2 G: 40 INJECTION, SOLUTION INTRAVENOUS at 08:48

## 2019-10-06 RX ADMIN — PANTOPRAZOLE SODIUM 40 MG: 40 TABLET, DELAYED RELEASE ORAL at 05:06

## 2019-10-06 RX ADMIN — ACETAMINOPHEN 650 MG: 325 TABLET ORAL at 00:36

## 2019-10-06 RX ADMIN — GABAPENTIN 600 MG: 300 CAPSULE ORAL at 16:48

## 2019-10-06 RX ADMIN — ZONISAMIDE 200 MG: 100 CAPSULE ORAL at 08:17

## 2019-10-06 NOTE — ASSESSMENT & PLAN NOTE
· Related to intractable migraine headache and polypharmacy    Improved/resolved   · Patient reports that dizziness always occurs when she gets migraine the exacerbation  · She requires multiple potentially sedating medication for her neuropathic pain/bipolar disorder/migraine

## 2019-10-06 NOTE — PROGRESS NOTES
Progress Note - Damián Galicia 1960, 59 y o  female MRN: [id number]    Unit/Bed#: E4 -01 Encounter: [id number]    Primary Care Provider: Suyapa Nicolas DO   Date and time admitted to hospital: 10/2/2019  2:26 PM        Intractable migraine without aura and without status migrainosus  Assessment & Plan  · On Aimovig and Botox injections outpatient  · Continue Zonegran and lamictal  · Much improved with IV magnesium and depakote   · She felt ready to go home this morning  · This afternoon while preparing for DC, she felt dizzy again and changed her mind  · She requested to stay another day  · Recommended OP follow up with her primary neurologist for migraine treatements      * Dizziness  Assessment & Plan  · Related to intractable migraine headache and polypharmacy  Improved/resolved this am but recurred this afternoon  · Patient reports that dizziness always occurs when she gets migraine the exacerbation  · She requires multiple potentially sedating medication for her neuropathic pain/bipolar disorder/migraine    Chronic anticoagulation  Assessment & Plan  · Due to history of DVT  · Continue Eliquis 5 mg BID    Bipolar disorder  Assessment & Plan  · On several psychiatric medications as an outpatient  · Medications verified through Care everywhere and PDMP  · Continue psychiatric medications: Abilify 5 mg daily, Luvox 100 mg daily, Neurontin 600 mg TID with caution  VTE Pharmacologic Prophylaxis:   Pharmacologic: Apixaban (Eliquis)  Mechanical VTE Prophylaxis in Place: No    Patient Centered Rounds: I have performed bedside rounds with nursing staff today  Discussions with Specialists or Other Care Team Provider: None    Education and Discussions with Family / Patient: spoke with  and gave update    Time Spent for Care: 30 minutes  More than 50% of total time spent on counseling and coordination of care as described above      Current Length of Stay: 3 day(s)    Current Patient Status: Inpatient   Certification Statement: The patient will continue to require additional inpatient hospital stay due to dizziness and fall risk    Discharge Plan: not ready for DC now  Dc in 24 hours    Code Status: Level 1 - Full Code      Subjective:   She felt better this am and felt well enough to go home  DC papers were done and she was all ready to go this pm   However she developed dizziness again and feels "afraid to go home"  I tried to walk her and she felt dizzy 4 feet away from her bed and had to stop    Objective:     Vitals:   Temp (24hrs), Av 9 °F (36 6 °C), Min:97 5 °F (36 4 °C), Max:98 4 °F (36 9 °C)    Temp:  [97 5 °F (36 4 °C)-98 4 °F (36 9 °C)] 97 9 °F (36 6 °C)  HR:  [67-94] 77  Resp:  [20] 20  BP: (100-136)/(53-69) 123/54  SpO2:  [92 %-99 %] 95 %  Body mass index is 40 59 kg/m²  Input and Output Summary (last 24 hours): Intake/Output Summary (Last 24 hours) at 10/6/2019 1428  Last data filed at 10/6/2019 1048  Gross per 24 hour   Intake 900 ml   Output 875 ml   Net 25 ml       Physical Exam:     Physical Exam   Constitutional: She is oriented to person, place, and time  She appears well-developed  No distress  obese   HENT:   Head: Normocephalic and atraumatic  Eyes: No scleral icterus  Neck: No JVD present  Cardiovascular: Regular rhythm  Exam reveals no gallop and no friction rub  No murmur heard  Pulmonary/Chest: Effort normal  No stridor  No respiratory distress  She has no wheezes  Abdominal: Soft  She exhibits no distension  There is no tenderness  There is no guarding  Musculoskeletal: She exhibits no edema or deformity  Neurological: She is alert and oriented to person, place, and time  Walked with short slow steps   Skin: Skin is warm and dry  She is not diaphoretic  Psychiatric: She has a normal mood and affect   Her behavior is normal      Additional Data:     Labs:    Results from last 7 days   Lab Units 10/03/19  0542   WBC Thousand/uL 6 38 HEMOGLOBIN g/dL 11 4*   HEMATOCRIT % 36 1   PLATELETS Thousands/uL 269   NEUTROS PCT % 56   LYMPHS PCT % 29   MONOS PCT % 11   EOS PCT % 2     Results from last 7 days   Lab Units 10/03/19  0542   SODIUM mmol/L 145   POTASSIUM mmol/L 3 6   CHLORIDE mmol/L 111*   CO2 mmol/L 25   BUN mg/dL 15   CREATININE mg/dL 0 73   ANION GAP mmol/L 9   CALCIUM mg/dL 8 8   GLUCOSE RANDOM mg/dL 86         Results from last 7 days   Lab Units 10/03/19  0756   POC GLUCOSE mg/dl 90                   * I Have Reviewed All Lab Data Listed Above  * Additional Pertinent Lab Tests Reviewed: All Labs Within Last 24 Hours Reviewed    Imaging:    Imaging Reports Reviewed Today Include: non    Recent Cultures (last 7 days):           Last 24 Hours Medication List:     Current Facility-Administered Medications:  acetaminophen 650 mg Oral Q6H PRN Randee Sarkar PA-C   apixaban 5 mg Oral BID Randee Sarkar PA-C   ARIPiprazole 5 mg Oral Daily Eulaliost Pica SIRISHA Delacruz   fluvoxaMINE 100 mg Oral HS Eulaliost Pica SIRISHA Carey   gabapentin 600 mg Oral TID Randee Sarkar PA-C   lamoTRIgine 200 mg Oral Daily Tabby Maloney MD   lidocaine 1 patch Topical Daily Eulaliost [place] SIRISHA Carey   LORazepam 2 mg Oral TID Randee Sarkar PA-C   meclizine 12 5 mg Oral [place], [place]   mexiletine 150 mg Oral TID Eulaliost Pica SIRISHA Carey   ondansetron 4 mg Intravenous Q6H PRN Eulaliost Pica SIRISHA Carey   pantoprazole 40 mg Oral Early Morning Tabby Maloney MD   polyethylene glycol 17 g Oral Daily PRN Fabien Crooks PA-C   senna 2 tablet Oral Daily PRN Yaritza Grissom PA-C   zonisamide 200 mg Oral Daily Randee Sarkar PA-C        Today, Patient Was Seen By: Tabby Maloney MD    ** Please Note: Dictation voice to text software may have been used in the creation of this document   **

## 2019-10-06 NOTE — ASSESSMENT & PLAN NOTE
· Related to intractable migraine headache and polypharmacy  Improved/resolved this am but recurred this afternoon     · Patient reports that dizziness always occurs when she gets migraine the exacerbation  · She requires multiple potentially sedating medication for her neuropathic pain/bipolar disorder/migraine

## 2019-10-06 NOTE — ASSESSMENT & PLAN NOTE
· On several psychiatric medications as an outpatient  · Medications verified through Care everywhere and PDMP  · Continue psychiatric medications: Abilify 5 mg daily, Luvox 100 mg daily, Neurontin 600 mg TID with caution

## 2019-10-06 NOTE — ASSESSMENT & PLAN NOTE
· On Aimovig and Botox injections outpatient  · Continue Zonegran and lamictal  · Much improved with IV magnesium and depakote   · She feels ready to go home  · Recommended OP follow up with her primary neurologist for migraine treatements

## 2019-10-06 NOTE — DISCHARGE SUMMARY
Discharge- Prateek [facility] 1960, 59 y o  female MRN: [id number]    Unit/Bed#: E4 -01 Encounter: [id number]    Primary Care Provider: Madison Zurita DO   Date and time admitted to hospital: 10/2/2019  2:26 PM        Intractable migraine without aura and without status migrainosus  Assessment & Plan  · On Aimovig and Botox injections outpatient  · Continue Zonegran and lamictal  · Much improved with IV magnesium and depakote   · She feels ready to go home  · Recommended OP follow up with her primary neurologist for migraine treatements      * Dizziness  Assessment & Plan  · Related to intractable migraine headache and polypharmacy  Improved/resolved   · Patient reports that dizziness always occurs when she gets migraine the exacerbation  · She requires multiple potentially sedating medication for her neuropathic pain/bipolar disorder/migraine    Chronic anticoagulation  Assessment & Plan  · Due to history of DVT  · Continue Eliquis 5 mg BID    Bipolar disorder  Assessment & Plan  · On several psychiatric medications as an outpatient  · Medications verified through Care everywhere and PDMP  · Continue psychiatric medications: Abilify 5 mg daily, Luvox 100 mg daily, Neurontin 600 mg TID with caution          Discharging Physician / Practitioner: Samaria Starr MD  PCP: Madison Zurita DO  Admission Date:   Admission Orders (From admission, onward)     Ordered        10/04/19 1554  Inpatient Admission  Once         10/02/19 1910  Place in Observation  Once         10/02/19 1902  Inpatient Admission  Once,   Status:  Canceled                   Discharge Date: 10/06/19    Resolved Problems  Date Reviewed: 10/6/2019    None          Consultations During Hospital Stay:  · Neurology    Procedures Performed:   · Intravenous magnesium sulfate and Depacon infusions    Significant Findings / Test Results:   · CT head-no stroke    Incidental Findings:   · None     Test Results Pending at Discharge (will require follow up):   · None     Outpatient Tests Requested:  · None    Complications:  None    Reason for Admission:  Dizziness    Hospital Course:     Pierre Taylor is a 59 y o  female patient who originally presented to the hospital on 10/2/2019 due to persistent headache associated with dizziness  She has known history of migraine headaches and bipolar disorder  She is on regular Botox injections and Aimovig followed by [facility] for her migraine  He is on multiple psychotropic medications for bipolar disorder  She was found to have intractable migraine headache with dizziness  She did not improve with IV Toradol/Reglan/Benadryl combination  A formal neurology consultation was sought and she was placed on a 3 day intravenous valproic acid and magnesium sulfate combination  The patient responded well with improvement/resolution of headache and dizziness  Today the patient felt well enough to go home  She was seen by Physical therapy and Occupational therapy who recommended home PT and OT so this will be prescribed on discharge  Please see above list of diagnoses and related plan for additional information  Condition at Discharge: good     Discharge Day Visit / Exam:     Subjective:  "I am feeling better " "I might as well go home " Improved dizziness and headache  Vitals: Blood Pressure: 123/54 (10/06/19 0757)  Pulse: 77 (10/06/19 0757)  Temperature: 97 9 °F (36 6 °C) (10/06/19 0753)  Temp Source: Tympanic (10/06/19 0753)  Respirations: 20 (10/06/19 0757)  Height: 5' 8" (172 7 cm) (10/02/19 1939)  Weight - Scale: 121 kg (266 lb 15 6 oz) (10/02/19 1939)  SpO2: 95 % (10/06/19 0753)  Exam:   Physical Exam   Constitutional: She is oriented to person, place, and time  She appears well-developed  No distress  Obese   HENT:   Head: Normocephalic and atraumatic  Eyes: No scleral icterus  Neck: No JVD present  Cardiovascular: Regular rhythm  Exam reveals no gallop and no friction rub  Pulmonary/Chest: Effort normal  No stridor  No respiratory distress  She has no wheezes  Abdominal: Soft  She exhibits no distension  There is no tenderness  There is no guarding  Musculoskeletal: She exhibits no edema or deformity  Neurological: She is alert and oriented to person, place, and time  Skin: Skin is warm and dry  She is not diaphoretic  Psychiatric: She has a normal mood and affect  Her behavior is normal      Discussion with Family: Left voicemail for  Kristy Conn    Discharge instructions/Information to patient and family:   See after visit summary for information provided to patient and family  Provisions for Follow-Up Care:  See after visit summary for information related to follow-up care and any pertinent home health orders  Disposition:     Home with VNA Services (Reminder: Complete face to face encounter)     Planned Readmission: none     Discharge Statement:  I spent >30 minutes discharging the patient  This time was spent on the day of discharge  I had direct contact with the patient on the day of discharge  Greater than 50% of the total time was spent examining patient, answering all patient questions, arranging and discussing plan of care with patient as well as directly providing post-discharge instructions  Additional time then spent on discharge activities  Discharge Medications:  See after visit summary for reconciled discharge medications provided to patient and family        ** Please Note: This note has been constructed using a voice recognition system **

## 2019-10-06 NOTE — PLAN OF CARE
Problem: Potential for Falls  Goal: Patient will remain free of falls  Description  INTERVENTIONS:  - Assess patient frequently for physical needs  -  Identify cognitive and physical deficits and behaviors that affect risk of falls  -  [place] fall precautions as indicated by assessment   - Educate patient/family on patient safety including physical limitations  - Instruct patient to call for assistance with activity based on assessment  - Modify environment to reduce risk of injury  - Consider OT/PT consult to assist with strengthening/mobility  Outcome: Progressing     Problem: PAIN - ADULT  Goal: Verbalizes/displays adequate comfort level or baseline comfort level  Description  Interventions:  - Encourage patient to monitor pain and request assistance  - Assess pain using appropriate pain scale  - Administer analgesics based on type and severity of pain and evaluate response  - Implement non-pharmacological measures as appropriate and evaluate response  - Notify physician/advanced practitioner if interventions unsuccessful or patient reports new pain   Outcome: Progressing     Problem: DISCHARGE PLANNING  Goal: Discharge to home or other facility with appropriate resources  Description  INTERVENTIONS:  - Identify barriers to discharge w/patient and caregiver  - Arrange for needed discharge resources and transportation as appropriate  - Identify discharge learning needs (meds)  - Refer to Case Management Department for coordinating discharge planning if the patient needs post-hospital services based on physician/advanced practitioner order or complex needs related to functional status, cognitive ability, or social support system   Outcome: Progressing     Problem: Knowledge Deficit  Goal: Patient/family/caregiver demonstrates understanding of disease process, treatment plan, medications, and discharge instructions  Description  Complete learning assessment and assess knowledge base    Interventions:  - Provide teaching at level of understanding  - Provide teaching via preferred learning methods  Outcome: Progressing     Problem: Prexisting or High Potential for Compromised Skin Integrity  Goal: Skin integrity is maintained or improved  Description  INTERVENTIONS:  - Identify patients at risk for skin breakdown  - Assess and monitor skin integrity  - Assess and monitor nutrition and hydration status  - Monitor labs   - Assess for incontinence   - Turn and reposition patient  - Assist with mobility/ambulation  - Relieve pressure over bony prominences  - Avoid friction and shearing  - Provide appropriate hygiene as needed including keeping skin clean and dry  - Evaluate need for skin moisturizer/barrier cream  - Collaborate with interdisciplinary team   - Patient/family teaching  - Consider wound care consult   Outcome: Progressing     Problem: Nutrition/Hydration-ADULT  Goal: Nutrient/Hydration intake appropriate for improving, restoring or maintaining nutritional needs  Description  Monitor and assess patient's nutrition/hydration status for malnutrition  Collaborate with interdisciplinary team and initiate plan and interventions as ordered  Monitor patient's weight and dietary intake as ordered or per policy  Utilize nutrition screening tool and intervene as necessary  Determine patient's food preferences and provide high-protein, high-caloric foods as appropriate       INTERVENTIONS:  - Monitor oral intake, urinary output, labs, and treatment plans  - Assess nutrition and hydration status and recommend course of action  - Evaluate amount of meals eaten  - Assist patient with eating if necessary   - Allow adequate time for meals  - Recommend/ encourage appropriate diets, oral nutritional supplements, and vitamin/mineral supplements  - Order, calculate, and assess calorie counts as needed  - Recommend, monitor, and adjust tube feedings and TPN/PPN based on assessed needs  - Assess need for intravenous fluids  - Provide specific nutrition/hydration education as appropriate  - Include patient/family/caregiver in decisions related to nutrition  Outcome: Progressing

## 2019-10-06 NOTE — ASSESSMENT & PLAN NOTE
· On Aimovig and Botox injections outpatient  · Continue Zonegran and lamictal  · Much improved with IV magnesium and depakote   · She felt ready to go home this morning  · This afternoon while preparing for DC, she felt dizzy again and changed her mind    · She requested to stay another day  · Recommended OP follow up with her primary neurologist for migraine treatements

## 2019-10-06 NOTE — PROGRESS NOTES
Patient given DC instructions and follow ups  Patient complaining of dizziness and stating she's afraid to go home and fall  Per patient, this dizziness just started  Ortho vital signs obtained, negative  Patient requesting to speak to MD BRYANT paged, at bedside

## 2019-10-06 NOTE — PLAN OF CARE
Problem: Potential for Falls  Goal: Patient will remain free of falls  Description  INTERVENTIONS:  - Assess patient frequently for physical needs  -  Identify cognitive and physical deficits and behaviors that affect risk of falls  -  [place] fall precautions as indicated by assessment   - Educate patient/family on patient safety including physical limitations  - Instruct patient to call for assistance with activity based on assessment  - Modify environment to reduce risk of injury  - Consider OT/PT consult to assist with strengthening/mobility  Outcome: Progressing     Problem: PAIN - ADULT  Goal: Verbalizes/displays adequate comfort level or baseline comfort level  Description  Interventions:  - Encourage patient to monitor pain and request assistance  - Assess pain using appropriate pain scale  - Administer analgesics based on type and severity of pain and evaluate response  - Implement non-pharmacological measures as appropriate and evaluate response  - Notify physician/advanced practitioner if interventions unsuccessful or patient reports new pain   Outcome: Progressing     Problem: DISCHARGE PLANNING  Goal: Discharge to home or other facility with appropriate resources  Description  INTERVENTIONS:  - Identify barriers to discharge w/patient and caregiver  - Arrange for needed discharge resources and transportation as appropriate  - Identify discharge learning needs (meds)  - Refer to Case Management Department for coordinating discharge planning if the patient needs post-hospital services based on physician/advanced practitioner order or complex needs related to functional status, cognitive ability, or social support system   Outcome: Progressing     Problem: Knowledge Deficit  Goal: Patient/family/caregiver demonstrates understanding of disease process, treatment plan, medications, and discharge instructions  Description  Complete learning assessment and assess knowledge base    Interventions:  - Provide teaching at level of understanding  - Provide teaching via preferred learning methods  Outcome: Progressing     Problem: Prexisting or High Potential for Compromised Skin Integrity  Goal: Skin integrity is maintained or improved  Description  INTERVENTIONS:  - Identify patients at risk for skin breakdown  - Assess and monitor skin integrity  - Assess and monitor nutrition and hydration status  - Monitor labs   - Assess for incontinence   - Turn and reposition patient  - Assist with mobility/ambulation  - Relieve pressure over bony prominences  - Avoid friction and shearing  - Provide appropriate hygiene as needed including keeping skin clean and dry  - Evaluate need for skin moisturizer/barrier cream  - Collaborate with interdisciplinary team   - Patient/family teaching  - Consider wound care consult   Outcome: Progressing     Problem: Nutrition/Hydration-ADULT  Goal: Nutrient/Hydration intake appropriate for improving, restoring or maintaining nutritional needs  Description  Monitor and assess patient's nutrition/hydration status for malnutrition  Collaborate with interdisciplinary team and initiate plan and interventions as ordered  Monitor patient's weight and dietary intake as ordered or per policy  Utilize nutrition screening tool and intervene as necessary  Determine patient's food preferences and provide high-protein, high-caloric foods as appropriate       INTERVENTIONS:  - Monitor oral intake, urinary output, labs, and treatment plans  - Assess nutrition and hydration status and recommend course of action  - Evaluate amount of meals eaten  - Assist patient with eating if necessary   - Allow adequate time for meals  - Recommend/ encourage appropriate diets, oral nutritional supplements, and vitamin/mineral supplements  - Order, calculate, and assess calorie counts as needed  - Recommend, monitor, and adjust tube feedings and TPN/PPN based on assessed needs  - Assess need for intravenous fluids  - Provide specific nutrition/hydration education as appropriate  - Include patient/family/caregiver in decisions related to nutrition  Outcome: Progressing

## 2019-10-06 NOTE — PLAN OF CARE
Problem: Potential for Falls  Goal: Patient will remain free of falls  Description  INTERVENTIONS:  - Assess patient frequently for physical needs  -  Identify cognitive and physical deficits and behaviors that affect risk of falls    -  [place] fall precautions as indicated by assessment   - Educate patient/family on patient safety including physical limitations  - Instruct patient to call for assistance with activity based on assessment  - Modify environment to reduce risk of injury  - Consider OT/PT consult to assist with strengthening/mobility  10/6/2019 1150 by Thai Magallon RN  Outcome: Adequate for Discharge  10/6/2019 0830 by Thai Magallon RN  Outcome: Progressing     Problem: PAIN - ADULT  Goal: Verbalizes/displays adequate comfort level or baseline comfort level  Description  Interventions:  - Encourage patient to monitor pain and request assistance  - Assess pain using appropriate pain scale  - Administer analgesics based on type and severity of pain and evaluate response  - Implement non-pharmacological measures as appropriate and evaluate response  - Notify physician/advanced practitioner if interventions unsuccessful or patient reports new pain   10/6/2019 1150 by Thai Magallon RN  Outcome: Adequate for Discharge  10/6/2019 0830 by Thai Magallon RN  Outcome: Progressing     Problem: DISCHARGE PLANNING  Goal: Discharge to home or other facility with appropriate resources  Description  INTERVENTIONS:  - Identify barriers to discharge w/patient and caregiver  - Arrange for needed discharge resources and transportation as appropriate  - Identify discharge learning needs (meds)  - Refer to Case Management Department for coordinating discharge planning if the patient needs post-hospital services based on physician/advanced practitioner order or complex needs related to functional status, cognitive ability, or social support system   10/6/2019 1150 by Thai Magallon RN  Outcome: Adequate for Discharge  10/6/2019 0830 by Abimael Talbert RN  Outcome: Progressing     Problem: Knowledge Deficit  Goal: Patient/family/caregiver demonstrates understanding of disease process, treatment plan, medications, and discharge instructions  Description  Complete learning assessment and assess knowledge base  Interventions:  - Provide teaching at level of understanding  - Provide teaching via preferred learning methods  10/6/2019 1150 by Abimael Talbert RN  Outcome: Adequate for Discharge  10/6/2019 0830 by Abimael Talbert RN  Outcome: Progressing     Problem: Prexisting or High Potential for Compromised Skin Integrity  Goal: Skin integrity is maintained or improved  Description  INTERVENTIONS:  - Identify patients at risk for skin breakdown  - Assess and monitor skin integrity  - Assess and monitor nutrition and hydration status  - Monitor labs   - Assess for incontinence   - Turn and reposition patient  - Assist with mobility/ambulation  - Relieve pressure over bony prominences  - Avoid friction and shearing  - Provide appropriate hygiene as needed including keeping skin clean and dry  - Evaluate need for skin moisturizer/barrier cream  - Collaborate with interdisciplinary team   - Patient/family teaching  - Consider wound care consult   10/6/2019 1150 by Abimael Talbert RN  Outcome: Adequate for Discharge  10/6/2019 0830 by Abimael Talbert RN  Outcome: Progressing     Problem: Nutrition/Hydration-ADULT  Goal: Nutrient/Hydration intake appropriate for improving, restoring or maintaining nutritional needs  Description  Monitor and assess patient's nutrition/hydration status for malnutrition  Collaborate with interdisciplinary team and initiate plan and interventions as ordered  Monitor patient's weight and dietary intake as ordered or per policy  Utilize nutrition screening tool and intervene as necessary  Determine patient's food preferences and provide high-protein, high-caloric foods as appropriate  INTERVENTIONS:  - Monitor oral intake, urinary output, labs, and treatment plans  - Assess nutrition and hydration status and recommend course of action  - Evaluate amount of meals eaten  - Assist patient with eating if necessary   - Allow adequate time for meals  - Recommend/ encourage appropriate diets, oral nutritional supplements, and vitamin/mineral supplements  - Order, calculate, and assess calorie counts as needed  - Recommend, monitor, and adjust tube feedings and TPN/PPN based on assessed needs  - Assess need for intravenous fluids  - Provide specific nutrition/hydration education as appropriate  - Include patient/family/caregiver in decisions related to nutrition  10/6/2019 1150 by Nora Escoto, RN  Outcome: Adequate for Discharge  10/6/2019 0830 by Nora Escoto, RN  Outcome: Progressing

## 2019-10-07 VITALS
HEIGHT: 68 IN | BODY MASS INDEX: 40.46 KG/M2 | RESPIRATION RATE: 20 BRPM | DIASTOLIC BLOOD PRESSURE: 54 MMHG | SYSTOLIC BLOOD PRESSURE: 107 MMHG | TEMPERATURE: 97.4 F | HEART RATE: 85 BPM | WEIGHT: 266.98 LBS | OXYGEN SATURATION: 93 %

## 2019-10-07 PROCEDURE — 99239 HOSP IP/OBS DSCHRG MGMT >30: CPT | Performed by: INTERNAL MEDICINE

## 2019-10-07 PROCEDURE — 97530 THERAPEUTIC ACTIVITIES: CPT

## 2019-10-07 PROCEDURE — 97110 THERAPEUTIC EXERCISES: CPT

## 2019-10-07 PROCEDURE — 97116 GAIT TRAINING THERAPY: CPT

## 2019-10-07 RX ORDER — ARIPIPRAZOLE 15 MG/1
15 TABLET ORAL DAILY
COMMUNITY
End: 2019-10-20 | Stop reason: HOSPADM

## 2019-10-07 RX ORDER — MECLIZINE HCL 12.5 MG/1
12.5 TABLET ORAL EVERY 8 HOURS PRN
Qty: 60 TABLET | Refills: 0 | Status: ON HOLD | OUTPATIENT
Start: 2019-10-07 | End: 2020-07-01 | Stop reason: SDUPTHER

## 2019-10-07 RX ORDER — ONDANSETRON 4 MG/1
4 TABLET, FILM COATED ORAL EVERY 8 HOURS PRN
Qty: 60 TABLET | Refills: 2 | Status: SHIPPED | OUTPATIENT
Start: 2019-10-07 | End: 2022-02-28 | Stop reason: ALTCHOICE

## 2019-10-07 RX ORDER — ZONISAMIDE 100 MG/1
200 CAPSULE ORAL DAILY
COMMUNITY
End: 2020-09-09

## 2019-10-07 RX ADMIN — MEXILETINE HYDROCHLORIDE 150 MG: 150 CAPSULE ORAL at 10:25

## 2019-10-07 RX ADMIN — LAMOTRIGINE 200 MG: 100 TABLET ORAL at 10:25

## 2019-10-07 RX ADMIN — MECLIZINE 12.5 MG: 12.5 TABLET ORAL at 06:09

## 2019-10-07 RX ADMIN — PANTOPRAZOLE SODIUM 40 MG: 40 TABLET, DELAYED RELEASE ORAL at 06:09

## 2019-10-07 RX ADMIN — ZONISAMIDE 200 MG: 100 CAPSULE ORAL at 10:25

## 2019-10-07 RX ADMIN — GABAPENTIN 600 MG: 300 CAPSULE ORAL at 10:25

## 2019-10-07 RX ADMIN — LORAZEPAM 2 MG: 1 TABLET ORAL at 10:25

## 2019-10-07 RX ADMIN — ARIPIPRAZOLE 5 MG: 5 TABLET ORAL at 10:25

## 2019-10-07 RX ADMIN — MECLIZINE 12.5 MG: 12.5 TABLET ORAL at 13:04

## 2019-10-07 RX ADMIN — ACETAMINOPHEN 650 MG: 325 TABLET ORAL at 04:37

## 2019-10-07 RX ADMIN — LIDOCAINE 1 PATCH: 50 PATCH TOPICAL at 10:29

## 2019-10-07 RX ADMIN — APIXABAN 5 MG: 5 TABLET, FILM COATED ORAL at 10:25

## 2019-10-07 RX ADMIN — POLYETHYLENE GLYCOL 3350 17 G: 17 POWDER, FOR SOLUTION ORAL at 13:04

## 2019-10-07 RX ADMIN — ACETAMINOPHEN 650 MG: 325 TABLET ORAL at 13:04

## 2019-10-07 NOTE — DISCHARGE SUMMARY
Discharge- Corrinne Chum 1960, 59 y o  female MRN: [id number]  Unit/Bed#: E4 -01 Encounter: [id number]  Primary Care Provider: Owen Urban DO   Date and time admitted to hospital: 10/2/2019  2:26 PM        Admitting Provider:  Areli Hull MD  Discharge Provider:  uSlly Camacho DO  Admission Date: 10/2/2019       Discharge Date: 10/07/19   LOS: 4  Primary Care Physician at Discharge: Owen Urban, [facility] Matt Horner COURSE:  Corrinne Chum is a 59 y o  female who presented to the hospital with dizziness  The patient has underlying bipolar depression migraine headache and fibromyalgia  She was hospitalized for four days and was seen by Neurology as well as PT/OT  Reportedly she gets Botox injections at [facility] THE [facility]  She was given cocktail of ketorolac metoclopramide and Benadryl  She was given three day course of valproic acid and magnesium  At time of discharge she is feeling better and able to ambulate without further assistance  PT/OT recommending home services which she will be set up with  DISCHARGE DIAGNOSES  Intractable migraine without aura and without status migrainosus  Assessment & Plan  Migraine headache known to [facility]  Should follow as outpatient after discharge  Continue zonisamide and lamictal     Chronic anticoagulation  Assessment & Plan  History of DVT on Eliquis    Bipolar disorder  Assessment & Plan  Bipolar disorder on abilify, fluoxamine, gabapentin    Fibromyalgia  Assessment & Plan  Reported history of fibromyalgia with chronic pain  Continue gabapentin and mexiletine    CONSULTING PROVIDERS   IP CONSULT TO NEUROLOGY      RADIOLOGY RESULTS  Ct Head Without Contrast  Result Date: 10/2/2019  Impression: No acute intracranial abnormality  Workstation performed: [id number]     Ct Cervical Spine Without Contrast  Result Date: 10/2/2019  Impression: No cervical spine fracture or traumatic malalignment    Workstation performed: [id number] LABS  Results from last 7 days   Lab Units 10/03/19  0542 10/02/19  1510   WBC Thousand/uL 6 38 6 68   HEMOGLOBIN g/dL 11 4* 12 3   HEMATOCRIT % 36 1 39 2   MCV fL 96 95   PLATELETS Thousands/uL 269 285     Results from last 7 days   Lab Units 10/03/19  0542 10/02/19  1510   SODIUM mmol/L 145 145   POTASSIUM mmol/L 3 6 4 0   CHLORIDE mmol/L 111* 108   CO2 mmol/L 25 29   BUN mg/dL 15 18   CREATININE mg/dL 0 73 0 82   CALCIUM mg/dL 8 8 8 9   EGFR ml/min/1 73sq m 90 78   GLUCOSE RANDOM mg/dL 86 98                  Results from last 7 days   Lab Units 10/03/19  0756   POC GLUCOSE mg/dl 90         Results from last 7 days   Lab Units 10/03/19  0542   TSH 3RD GENERATON uIU/mL 1 884       PHYSICAL EXAM:  Vitals:   Blood Pressure: 107/54 (10/07/19 0815)  Pulse: 85 (10/07/19 0815)  Temperature: (!) 97 4 °F (36 3 °C) (10/07/19 0812)  Temp Source: Temporal (10/07/19 0685)  Respirations: 20 (10/07/19 0815)  Height: 5' 8" (172 7 cm) (10/02/19 1939)  Weight - Scale: 121 kg (266 lb 15 6 oz) (10/02/19 1939)  SpO2: 93 % (10/07/19 0812)    General appearance: alert, appears stated age and cooperative  Head: Normocephalic, without obvious abnormality, atraumatic  Eyes: conjunctivae/corneas clear  PERRL, EOM's intact  Lungs: clear to auscultation bilaterally  Heart: regular rate and rhythm  Abdomen: Obese soft nontender  Back: range of motion normal  Extremities: edema +1 lower extremities bilaterally  Neurologic: Grossly normal    Planned Re-admission: No  Discharge Disposition: Home/Self Care    Test Results Pending at Discharge:    Order Current Status    Lamotrigine level In process      Incidental findings: None    Medications   · Summary of Medication Adjustments made as a result of this hospitalization:   · Dizziness  Meclizine as needed  · Nausea    Ondansetron as needed  · Medication Dosing Tapers - Please refer to Discharge Medication List for details on any medication dosing tapers (if applicable to patient)  · Discharge Medication List: See after visit summary for reconciled discharge medications  Diet restrictions:  Regular diet   Activity restrictions: No strenuous activity  Discharge Condition: stable    Outpatient Follow-Up and Discharge Instructions  See after visit summary section titled Discharge Instructions for information provided to patient and family  Code Status: Level 1 - Full Code  Discharge Statement   I spent 35 minutes discharging the patient  This time was spent on the day of discharge  Greater than 50% of total time was spent with the patient and / or family counseling and / or coordination of care  ** Please Note: This note has been constructed using a voice recognition system   **

## 2019-10-07 NOTE — ASSESSMENT & PLAN NOTE
Migraine headache known to [facility] THE [facility]  Should follow as outpatient after discharge    Continue zonisamide and lamictal

## 2019-10-07 NOTE — PLAN OF CARE
Problem: Potential for Falls  Goal: Patient will remain free of falls  Description  INTERVENTIONS:  - Assess patient frequently for physical needs  -  Identify cognitive and physical deficits and behaviors that affect risk of falls  -  [place] fall precautions as indicated by assessment   - Educate patient/family on patient safety including physical limitations  - Instruct patient to call for assistance with activity based on assessment  - Modify environment to reduce risk of injury  - Consider OT/PT consult to assist with strengthening/mobility  Outcome: Progressing     Problem: PAIN - ADULT  Goal: Verbalizes/displays adequate comfort level or baseline comfort level  Description  Interventions:  - Encourage patient to monitor pain and request assistance  - Assess pain using appropriate pain scale  - Administer analgesics based on type and severity of pain and evaluate response  - Implement non-pharmacological measures as appropriate and evaluate response  - Notify physician/advanced practitioner if interventions unsuccessful or patient reports new pain   Outcome: Progressing     Problem: DISCHARGE PLANNING  Goal: Discharge to home or other facility with appropriate resources  Description  INTERVENTIONS:  - Identify barriers to discharge w/patient and caregiver  - Arrange for needed discharge resources and transportation as appropriate  - Identify discharge learning needs (meds)  - Refer to Case Management Department for coordinating discharge planning if the patient needs post-hospital services based on physician/advanced practitioner order or complex needs related to functional status, cognitive ability, or social support system   Outcome: Progressing     Problem: Knowledge Deficit  Goal: Patient/family/caregiver demonstrates understanding of disease process, treatment plan, medications, and discharge instructions  Description  Complete learning assessment and assess knowledge base    Interventions:  - Provide teaching at level of understanding  - Provide teaching via preferred learning methods  Outcome: Progressing     Problem: Prexisting or High Potential for Compromised Skin Integrity  Goal: Skin integrity is maintained or improved  Description  INTERVENTIONS:  - Identify patients at risk for skin breakdown  - Assess and monitor skin integrity  - Assess and monitor nutrition and hydration status  - Monitor labs   - Assess for incontinence   - Turn and reposition patient  - Assist with mobility/ambulation  - Relieve pressure over bony prominences  - Avoid friction and shearing  - Provide appropriate hygiene as needed including keeping skin clean and dry  - Evaluate need for skin moisturizer/barrier cream  - Collaborate with interdisciplinary team   - Patient/family teaching  - Consider wound care consult   Outcome: Progressing     Problem: Nutrition/Hydration-ADULT  Goal: Nutrient/Hydration intake appropriate for improving, restoring or maintaining nutritional needs  Description  Monitor and assess patient's nutrition/hydration status for malnutrition  Collaborate with interdisciplinary team and initiate plan and interventions as ordered  Monitor patient's weight and dietary intake as ordered or per policy  Utilize nutrition screening tool and intervene as necessary  Determine patient's food preferences and provide high-protein, high-caloric foods as appropriate       INTERVENTIONS:  - Monitor oral intake, urinary output, labs, and treatment plans  - Assess nutrition and hydration status and recommend course of action  - Evaluate amount of meals eaten  - Assist patient with eating if necessary   - Allow adequate time for meals  - Recommend/ encourage appropriate diets, oral nutritional supplements, and vitamin/mineral supplements  - Order, calculate, and assess calorie counts as needed  - Recommend, monitor, and adjust tube feedings and TPN/PPN based on assessed needs  - Assess need for intravenous fluids  - Provide specific nutrition/hydration education as appropriate  - Include patient/family/caregiver in decisions related to nutrition  Outcome: Progressing     Problem: NEUROSENSORY - ADULT  Goal: Achieves stable or improved neurological status  Description  INTERVENTIONS  - Monitor and report changes in neurological status  - Monitor vital signs such as temperature, blood pressure, glucose, and any other labs ordered   - Initiate measures to prevent increased intracranial pressure  - Monitor for seizure activity and implement precautions if appropriate      Outcome: Progressing  Goal: Remains free of injury related to seizures activity  Description  INTERVENTIONS  - Maintain airway, patient safety  and administer oxygen as ordered  - Monitor patient for seizure activity, document and report duration and description of seizure to physician/advanced practitioner  - If seizure occurs,  ensure patient safety during seizure  - Reorient patient post seizure  - Seizure pads on all 4 side rails  - Instruct patient/family to notify RN of any seizure activity including if an aura is experienced  - Instruct patient/family to call for assistance with activity based on nursing assessment  - Administer anti-seizure medications if ordered    Outcome: Progressing  Goal: Achieves maximal functionality and self care  Description  INTERVENTIONS  - Monitor swallowing and airway patency with patient fatigue and changes in neurological status  - Encourage and assist patient to increase activity and self care     - Encourage visually impaired, hearing impaired and aphasic patients to use assistive/communication devices  Outcome: Progressing     Problem: MUSCULOSKELETAL - ADULT  Goal: Maintain or return mobility to safest level of function  Description  INTERVENTIONS:  - Assess patient's ability to carry out ADLs; assess patient's baseline for ADL function and identify physical deficits which impact ability to perform ADLs (bathing, care of mouth/teeth, toileting, grooming, dressing, etc )  - Assess/evaluate cause of self-care deficits   - Assess range of motion  - Assess patient's mobility  - Assess patient's need for assistive devices and provide as appropriate  - Encourage maximum independence but intervene and supervise when necessary  - Involve family in performance of ADLs  - Assess for home care needs following discharge   - Consider OT consult to assist with ADL evaluation and planning for discharge  - Provide patient education as appropriate  Outcome: Progressing  Goal: Maintain proper alignment of affected body part  Description  INTERVENTIONS:  - Support, maintain and protect limb and body alignment  - Provide patient/ family with appropriate education  Outcome: Progressing

## 2019-10-07 NOTE — PLAN OF CARE
Problem: PHYSICAL THERAPY ADULT  Goal: Performs mobility at highest level of function for planned discharge setting  See evaluation for individualized goals  Description  Treatment/Interventions: Functional transfer training, LE strengthening/ROM, Elevations, Therapeutic exercise, Endurance training, Patient/family training, Equipment eval/education, Bed mobility, Gait training, Compensatory technique education, Spoke to nursing, OT  Equipment Recommended: Walker(pt reports she owns RW)       See flowsheet documentation for full assessment, interventions and recommendations  Outcome: Progressing  Note:   Prognosis: Fair  Problem List: Decreased strength, Decreased endurance, Impaired balance, Decreased mobility, Decreased coordination  Assessment: Pt is making steady progress with the use of a rolling walker  Cues required for safety with hand placement with transfers  Gait is slow but steady today and without loss of balance  Cues for step length required  Completed 3 steps with one handrail  Cues for safety with step length during descending negotiation  Completed seated BLE exercises to conclude session  Pt would benefit from continued physical therapy to maximize functional independence  Recommendation: Home PT, Home with family support     PT - OK to Discharge: Yes    See flowsheet documentation for full assessment

## 2019-10-07 NOTE — SOCIAL WORK
Pt was not discharge on Friday  Reviewed prior assessment  Pt lives in [facility] with her  in a [facility] being independent with all aspects of care, ambulating without an AD unless when feeling weak on occasion admits to using a SPC or RW which she has at home from previous surgeries  Pt drives self to appointments  Denies legal issues, D&A, states her  is her health care representative  Pt has a h/o Bipolar as listed on chart however denies same stating she has a mood disorder w/ anxiety  She sees a psychiatrist and therapist every 3 months at [address] [place] [address]   Pt's PCP is Dr Weller Doctor w/ [facility]  Pt uses [facility] on [address] for prescriptions  Pt's  to transport home on d/c  The discharge plan is home with spouse and [facility] for RN and PT

## 2019-10-07 NOTE — PHYSICAL THERAPY NOTE
Physical Therapy Progress Note     10/07/19 1400   Pain Assessment   Pain Assessment No/denies pain   Pain Score No Pain   Restrictions/Precautions   Weight Bearing Precautions Per Order No   Other Precautions Fall Risk   General   Family/Caregiver Present No   Cognition   Overall Cognitive Status WFL   Arousal/Participation Alert; Cooperative   Transfers   Sit to Stand 5  Supervision   Additional items Assist x 1   Stand to Sit 5  Supervision   Additional items Assist x 1   Ambulation/Elevation   Gait pattern Shuffling;Decreased foot clearance  (slow)   Gait Assistance 5  Supervision   Additional items Assist x 1   Assistive Device Rolling walker   Distance 120 feet and 10 feet x 2   Stair Management Assistance   (close supervision to contact guard assist)   Additional items Assist x 1;Verbal cues   Stair Management Technique One rail R;Step to pattern   Number of Stairs 3   Balance   Static Sitting Fair +   Static Standing Fair   Dynamic Standing Fair -   Ambulatory Fair -   Activity Tolerance   Activity Tolerance Patient tolerated treatment well   Fosterview to see per RN Olga   Exercises   Glute Sets Sitting;20 reps   Hip Flexion Sitting;20 reps;AROM; Bilateral   Knee AROM Long Arc Quad Sitting;20 reps;AROM; Bilateral   Ankle Pumps Sitting;20 reps;AROM; Bilateral   Assessment   Prognosis Fair   Problem List Decreased strength;Decreased endurance; Impaired balance;Decreased mobility; Decreased coordination   Assessment Pt is making steady progress with the use of a rolling walker  Cues required for safety with hand placement with transfers  Gait is slow but steady today and without loss of balance  Cues for step length required  Completed 3 steps with one handrail  Cues for safety with step length during descending negotiation  Completed seated BLE exercises to conclude session  Pt would benefit from continued physical therapy to maximize functional independence     Goals   Patient Goals Go home STG Expiration Date 10/18/19   Short Term Goal #1 1  Pt will increased strength by 1 grade in order to increase functional independence with transfers  2  Pt will increase balance grade by 1 in order to improve safety  3  Pt will improve transfer ability to mod I to increase functional independence and decrease caregiver burden  4  Pt will perform bed mobility independently to decrease caregiver burden  5  Pt will be able to amb 150 ft with RW and mod I to increase functional independence in home and community environments  6  Pt will be able to ascend and descend 3 stairs mod I to increase functional independence within the home environment  PT Treatment Day 1   Plan   Treatment/Interventions Functional transfer training;LE strengthening/ROM; Elevations; Therapeutic exercise; Endurance training;Patient/family training;Bed mobility;Gait training;Spoke to nursing   Progress Progressing toward goals   PT Frequency   (3-5x/week)   Recommendation   Recommendation Home PT; Home with family support   Equipment Recommended Walker  (DERRICK)     Dunia Charles, JOSEPH

## 2019-10-08 LAB — LAMOTRIGINE SERPL-MCNC: 1.9 UG/ML (ref 2–20)

## 2019-10-08 NOTE — UTILIZATION REVIEW
Initial Clinical Review    Admission: Date/Time/Statement:   Inpatient Admission Orders (From admission, onward)     Ordered        10/04/19 1554  Inpatient Admission  Once                 10/02/2019 @ [facility] 77  This Encounter   Procedures    Place in Observation     Standing Status:   Standing     Number of Occurrences:   1     Order Specific Question:   Admitting Physician     Answer:   Shanell Parker     Order Specific Question:   Level of Care     Answer:   Med Surg [16]    Inpatient Admission     Standing Status:   Standing     Number of Occurrences:   1     Order Specific Question:   Admitting Physician     Answer:   Eleanor Diaz [[id number]]     Order Specific Question:   Level of Care     Answer:   Med Surg [16]     Order Specific Question:   Bed Type     Answer:   [facility] [4]     Order Specific Question:   Estimated length of stay     Answer:   More than 2 Midnights     Order Specific Question:   Certification     Answer:   I certify that inpatient services are medically necessary for this patient for a duration of greater than two midnights  See H&P and MD Progress Notes for additional information about the patient's course of treatment  ED Arrival Information     Expected Arrival Acuity Means of Arrival Escorted By Service Admission Type    - 10/2/2019 14:19 Emergent Wheelchair Family Member Hospitalist Emergency    Arrival Complaint    Syncope Episodes        Chief Complaint   Patient presents with    Syncope     Pt syncope x 3 today, last one within one hour, reports LOC "for a few minutes" reports striking head on tile floor, taking eliquis BID  Assessment/Plan: 59year old female, presented to the ED from home via car  Admitted as Observation due to syncope  Suddenly collapsing 3 times today  Postural dizziness:  Presents to the emergency department with one day history of recurrent dizziness - appears peripheral in nature    History of recurrent syncope with otherwise unremarkable work up in the past   CT head: "no acute intracranial abnormality"  CT cervical: "No cervical spine fracture or traumatic malalignment "  EKG: NSR, QTc borderline at 475  Echo July 2019: EF 21% grade 1 diastolic dysfunction  VAS carotid January 2018: bilateral carotids < 50% stenosis  CBC and BMP WNL  Check magnesium  Trial meclizine q 8 hours  Check orthostatic BP q shift  Continue gentle IVF  Consider polypharmacy as patient is on multiple psychiatric medications and appears to have some inconsistency of what doses she is taking  PT/OT consult  10/03/2019  Intractable migraine without aura and without status migrainosus:  On Aimovig and Botox injections outpatient  Continue Zonegran  Start Toradol with Protonix for GI protection  Give magnesium sulfate 2 g IV x1  Consult Neurology  Avoid narcotics        ED Triage Vitals   Temperature Pulse Respirations Blood Pressure SpO2   10/02/19 1424 10/02/19 1424 10/02/19 1424 10/02/19 1424 10/02/19 1424   98 1 °F (36 7 °C) 77 18 155/81 99 %      Temp Source Heart Rate Source Patient Position - Orthostatic VS BP Location FiO2 (%)   10/02/19 1424 10/02/19 1424 10/02/19 1450 10/02/19 1450 --   Temporal Monitor Sitting Left arm       Pain Score       10/02/19 1424       Worst Possible Pain          Wt Readings from Last 1 Encounters:   10/02/19 121 kg (266 lb 15 6 oz)     Additional Vital Signs:   Date/Time  Temp  Pulse  Resp  BP  SpO2  O2 Device  Patient Position - Orthostatic VS   10/03/19 0801    81    142/87      Standing   10/03/19 0758    70    147/79      Sitting   10/03/19 0755  97 3 °F (36 3 °C)Abnormal   67  18  137/78  98 %  None (Room air)  Lying   10/03/19 0730            None (Room air)     10/02/19 2326  97 4 °F (36 3 °C)Abnormal   75  19  160/85  94 %  None (Room air)  Lying   10/02/19 1946        135/60      Standing - Orthostatic VS   10/02/19 1944        129/59      Sitting - Orthostatic VS   10/02/19 1939  97 °F (36 1 °C)Abnormal   68  19  136/61  97 %  None (Room air)  Lying - Orthostatic VS   10/02/19 1835    76  20  148/64  96 %  None (Room air)  Standing   10/02/19 1834    70  18  142/66  97 %  None (Room air)  Sitting   10/02/19 1833    68  16  150/74  97 %  None (Room air)  Lying   10/02/19 1756    60  18  138/62  98 %  None (Room air)     10/02/19 1602    66  18  142/63  94 %  None (Room air)  Sitting   10/02/19 1450    79    145/68  95 %  None (Room air)  Sitting   10/02/19 1424  98 1 °F (36 7 °C)  77  18  155/81  99 %  None (Room air)       Date and Time Eye Opening Best Verbal Response Best Motor Response Rajni Coma Scale Score   10/03/19 0730 4 5 6 15   10/03/19 0415 4 5 6 15   10/03/19 0015 4 5 6 15   10/02/19 2015 4 5 6 15   10/02/19 1449 4 5 6 15         Pertinent Labs/Diagnostic Test Results:   10/02/2019 @ 1611  CT head:  No acute intracranial abnormality  10/02/2019 @ 1613  CT C Spine:  No cervical spine fracture or traumatic malalignment      10/02/2019 @ 1438  EC, NSR, Minimal voltage criteria for LVH, may be normal variant    Results from last 7 days   Lab Units 10/03/19  0542 10/02/19  1510   WBC Thousand/uL 6 38 6 68   HEMOGLOBIN g/dL 11 4* 12 3   HEMATOCRIT % 36 1 39 2   PLATELETS Thousands/uL 269 285   NEUTROS ABS Thousands/µL 3 60 3 65     Results from last 7 days   Lab Units 10/03/19  0542 10/02/19  1510   SODIUM mmol/L 145 145   POTASSIUM mmol/L 3 6 4 0   CHLORIDE mmol/L 111* 108   CO2 mmol/L 25 29   ANION GAP mmol/L 9 8   BUN mg/dL 15 18   CREATININE mg/dL 0 73 0 82   EGFR ml/min/1 73sq m 90 78   CALCIUM mg/dL 8 8 8 9   MAGNESIUM mg/dL  --  2 4     Results from last 7 days   Lab Units 10/03/19  0542 10/02/19  1510   GLUCOSE RANDOM mg/dL 86 98     Results from last 7 days   Lab Units 10/03/19  0542   TSH 3RD [facility] uIU/mL 1 884     ED Treatment:   Medication Administration from 10/02/2019 1419 to 10/02/2019 192       Date/Time Order Dose Route Action 10/02/2019 1459 acetaminophen (TYLENOL) tablet 650 mg 650 mg Oral Given     10/02/2019 1722 ketorolac (TORADOL) injection 15 mg 15 mg Intravenous Given     10/02/2019 1647 diphenhydrAMINE (BENADRYL) injection 25 mg 25 mg Intravenous Given     10/02/2019 1751 diazepam (VALIUM) injection 2 5 mg 2 5 mg Intravenous Given     10/02/2019 1657 sodium chloride 0 9 % bolus 500 mL   Intravenous Restarted     10/02/2019 1650 sodium chloride 0 9 % bolus 500 mL 0 mL Intravenous Hold     10/02/2019 1646 sodium chloride 0 9 % bolus 500 mL 500 mL Intravenous New Bag        Past Medical History:   Diagnosis Date    Adrenal insufficiency ([place] disease) ([facility] [address] )     Bipolar disorder     Cervical radiculopathy     Chronic back pain     DVT, lower extremity ([facility] [address] ) 1991    Fibromyalgia     History of TIAs     cannot remember details    Hypertension     Hypokalemia     Migraine     MRSA (methicillin resistant Staphylococcus aureus) 07/20/2012    nasal swab negative 4/5/19    Psychiatric disorder     Anxiety, major depression, bipolar    Spinal stenosis     Syncope 2014    orthostatic hypotension     Present on Admission:   Dizziness   Bipolar disorder   Fibromyalgia      Admitting Diagnosis: Syncope [R55]  Vertigo [R42]  Minor head injury, initial encounter [S09 90XA]  Strain of neck muscle, initial encounter [S16  1XXA]  Age/Sex: 59 y o  female  Admission Orders:  Current Facility-Administered Medications:  acetaminophen 650 mg Oral Q6H PRN x 1 dose   apixaban 5 mg Oral BID   ARIPiprazole 5 mg Oral Daily   ferrous sulfate 325 mg Oral Daily With Breakfast   fluvoxaMINE 100 mg Oral HS   gabapentin 600 mg Oral TID   influenza vaccine 0 5 mL Intramuscular Prior to discharge   lamoTRIgine 200 mg Oral Daily   lidocaine 1 patch Topical Daily   LORazepam 2 mg Oral TID   meclizine 12 5 mg Oral Q8H [facility]   mexiletine 150 mg Oral TID   ondansetron 4 mg Intravenous Q6H PRN   sodium chloride 75 mL/hr Intravenous Continuous   zolpidem 5 mg Oral HS PRN   zonisamide 200 mg Oral Daily   diphenhydrAMINE (BENADRYL) injection 25 mg   Dose: 25 mg  Freq: Every 8 hours PRN Route: IV;  X 1 dose  PRN Reason: itching  PRN Comment: restlessness  Start: 10/03/19 1735 End: 10/06/19 1734  TELM  Neuro checks q4h      Network Utilization Review Department  Phone: [phone]; Fax [phone]  [email or web address]  ATTENTION: Please call with any questions or concerns to [phone]  and carefully listen to the prompts so that you are directed to the right person  Send all requests for admission clinical reviews, approved or denied determinations and any other requests to fax [phone]   All voicemails are confidential

## 2019-10-08 NOTE — UTILIZATION REVIEW
PATIENT WENT I/P ON 10/04 INITIAL CLINICALS HAVE BEEN ATTACHED TO PORTAL SINCE 10/05 FAXED DC'D INFO AND ATTACHED TO PORTAL AS WELL- [id number] [facility] @ [phone]

## 2019-10-08 NOTE — UTILIZATION REVIEW
Continued Stay Review    PLEASE NOTE:  Patient UPGRADED to IP 10/4 @ 24-20-52-61 from OBS 10/2 @ 1910 due to Pwersistent dizziness and Headache requiring IV Meds  Start   Ordered   10/04/19 1555  Inpatient Admission Once     Transfer Service: General Medicine       Question Answer Comment   Admitting Physician Bren Morton    Level of Care Med Surg    Bed Type [place]    Estimated length of stay More than 2 Midnights    Certification I certify that inpatient services are medically necessary for this patient for a duration of greater than two midnights  See H&P and MD Progress Notes for additional information about the patient's course of treatment  10/04/19 6886       Date:  10/4/2019                   Current Patient Class:  IP  Current Level of Care: MedSurg    HPI:58 y o  female initially admitted on 10/02/2019 @ 1910  To OBS  With Syncope  - On 10/3 she continued with persistent dizziness and headache 9/10  Migrainous versus polypharmacy  She was given MAgSulfate IV x 1, Zonegram was continued, started on Toradol  Neuro was consulted     Assessment/Plan:  Continues with HA  9/10  And dizziness  Wearing sunglasses in room  No improvement with toradol or Mag Sulfate  Per Neuro: Recurrent episodes of status migrainosus; Suspect near syncopal events secondary to polypharmacy  Start IV Depacon x 3 days and continue home Lamictal 200 mg daily and Zonegran 200 mg daily  hold on DHE for now, may consider if headache remains intractable       Pertinent Labs/Diagnostic Results:   Results from last 7 days   Lab Units 10/03/19  0542 10/02/19  1510   WBC Thousand/uL 6 38 6 68   HEMOGLOBIN g/dL 11 4* 12 3   HEMATOCRIT % 36 1 39 2   PLATELETS Thousands/uL 269 285   NEUTROS ABS Thousands/µL 3 60 3 65     Results from last 7 days   Lab Units 10/03/19  0542 10/02/19  1510   SODIUM mmol/L 145 145   POTASSIUM mmol/L 3 6 4 0   CHLORIDE mmol/L 111* 108   CO2 mmol/L 25 29   ANION GAP mmol/L 9 8   BUN mg/dL 15 18   CREATININE mg/dL 0 73 0 82   EGFR ml/min/1 73sq m 90 78   CALCIUM mg/dL 8 8 8 9   MAGNESIUM mg/dL  --  2 4     Results from last 7 days   Lab Units 10/03/19  0756   POC GLUCOSE mg/dl 90     Results from last 7 days   Lab Units 10/03/19  0542 10/02/19  1510   GLUCOSE RANDOM mg/dL 86 98     Results from last 7 days   Lab Units 10/03/19  0542   TSH 3RD [facility] uIU/mL 1 884     Vital Signs:   10/04/19 0745    102    110/71      Standing - Orthostatic VS   10/04/19 0741    89    106/62      Sitting - Orthostatic VS   10/04/19 0738  97 2 °F (36 2 °C)  70  18  150/80  96 %  None (Room air)  Lying - Orthostatic VS   10/04/19 0003  97 1 °F (36 2 °C)   67  18  121/67  95 %    Lying       Medications:   Scheduled Meds:     Scheduled Meds:  Current Facility-Administered Medications:  acetaminophen 650 mg Oral Q6H PRN X 1 10/3 & x 2 10/4   apixaban 5 mg Oral BID    ARIPiprazole 5 mg Oral Daily    diphenhydrAMINE 25 mg Intravenous Q8H PRN  x 1 10/3  &  X 1  10/4   fluvoxaMINE 100 mg Oral HS    gabapentin 600 mg Oral TID    ketorolac 30 mg Intravenous Q12H KIRSTIE    lidocaine 1 patch Topical Daily    LORazepam 2 mg Oral TID    magnesium sulfate 2 g Intravenous Q24H KIRSTIE    meclizine 12 5 mg Oral Q8H [address]    mexiletine 150 mg Oral TID    ondansetron 4 mg Intravenous Q6H PRN  IV x 2  10/4   pantoprazole 40 mg Oral Early Morning    valproate (DEPACON) IVPB 1,000 mg Intravenous Q24H [address] Started 10/4   zonisamide 200 mg Oral Daily          Discharge Plan: D    Network Utilization Review Department  Phone: [phone]; Fax [phone]  [email or web address]  ATTENTION: Please call with any questions or concerns to [phone]  and carefully listen to the prompts so that you are directed to the right person  Send all requests for admission clinical reviews, approved or denied determinations and any other requests to fax [phone]   All voicemails are confidential

## 2019-10-15 ENCOUNTER — APPOINTMENT (EMERGENCY)
Dept: CT IMAGING | Facility: HOSPITAL | Age: 59
DRG: 149 | End: 2019-10-15
Payer: COMMERCIAL

## 2019-10-15 ENCOUNTER — HOSPITAL ENCOUNTER (INPATIENT)
Facility: HOSPITAL | Age: 59
LOS: 5 days | Discharge: HOME HEALTH | DRG: 149 | End: 2019-10-20
Attending: EMERGENCY MEDICINE | Admitting: INTERNAL MEDICINE
Payer: COMMERCIAL

## 2019-10-15 ENCOUNTER — APPOINTMENT (EMERGENCY)
Dept: RADIOLOGY | Facility: HOSPITAL | Age: 59
DRG: 149 | End: 2019-10-15
Payer: COMMERCIAL

## 2019-10-15 DIAGNOSIS — R42 VERTIGO: ICD-10-CM

## 2019-10-15 DIAGNOSIS — R26.2 AMBULATORY DYSFUNCTION: ICD-10-CM

## 2019-10-15 DIAGNOSIS — M54.2 NECK PAIN: ICD-10-CM

## 2019-10-15 DIAGNOSIS — F31.9 BIPOLAR DEPRESSION (HCC): ICD-10-CM

## 2019-10-15 DIAGNOSIS — R42 DIZZINESS: ICD-10-CM

## 2019-10-15 DIAGNOSIS — Z86.73 HISTORY OF TIAS: ICD-10-CM
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
## 2019-10-15 DIAGNOSIS — R52 INTRACTABLE PAIN: ICD-10-CM

## 2019-10-15 PROBLEM — S06.9X9A CLOSED HEAD INJURY WITH BRIEF LOSS OF CONSCIOUSNESS (HCC): Status: ACTIVE | Noted: 2019-10-15

## 2019-10-15 PROBLEM — S06.9X1A CLOSED HEAD INJURY WITH BRIEF LOSS OF CONSCIOUSNESS (HCC): Status: ACTIVE | Noted: 2019-10-15

## 2019-10-15 LAB
ANION GAP SERPL CALCULATED.3IONS-SCNC: 7 MMOL/L (ref 4–13)
BACTERIA UR QL AUTO: ABNORMAL /HPF
BASOPHILS # BLD AUTO: 0.04 THOUSANDS/ΜL (ref 0–0.1)
BASOPHILS NFR BLD AUTO: 0 % (ref 0–1)
BILIRUB UR QL STRIP: NEGATIVE
BUN SERPL-MCNC: 10 MG/DL (ref 5–25)
CALCIUM SERPL-MCNC: 9.1 MG/DL (ref 8.3–10.1)
CAOX CRY URNS QL MICRO: ABNORMAL /HPF
CHLORIDE SERPL-SCNC: 110 MMOL/L (ref 100–108)
CLARITY UR: CLEAR
CO2 SERPL-SCNC: 29 MMOL/L (ref 21–32)
COLOR UR: YELLOW
CREAT SERPL-MCNC: 0.73 MG/DL (ref 0.6–1.3)
EOSINOPHIL # BLD AUTO: 0.04 THOUSAND/ΜL (ref 0–0.61)
EOSINOPHIL NFR BLD AUTO: 0 % (ref 0–6)
ERYTHROCYTE [DISTWIDTH] IN BLOOD BY AUTOMATED COUNT: 17.1 % (ref 11.6–15.1)
GFR SERPL CREATININE-BSD FRML MDRD: 90 ML/MIN/1.73SQ M
GLUCOSE SERPL-MCNC: 98 MG/DL (ref 65–140)
GLUCOSE UR STRIP-MCNC: NEGATIVE MG/DL
HCT VFR BLD AUTO: 37.4 % (ref 34.8–46.1)
HGB BLD-MCNC: 12.2 G/DL (ref 11.5–15.4)
HGB UR QL STRIP.AUTO: NEGATIVE
IMM GRANULOCYTES # BLD AUTO: 0.04 THOUSAND/UL (ref 0–0.2)
IMM GRANULOCYTES NFR BLD AUTO: 0 % (ref 0–2)
KETONES UR STRIP-MCNC: NEGATIVE MG/DL
LEUKOCYTE ESTERASE UR QL STRIP: ABNORMAL
LYMPHOCYTES # BLD AUTO: 1.58 THOUSANDS/ΜL (ref 0.6–4.47)
LYMPHOCYTES NFR BLD AUTO: 16 % (ref 14–44)
MCH RBC QN AUTO: 31 PG (ref 26.8–34.3)
MCHC RBC AUTO-ENTMCNC: 32.6 G/DL (ref 31.4–37.4)
MCV RBC AUTO: 95 FL (ref 82–98)
MONOCYTES # BLD AUTO: 1.01 THOUSAND/ΜL (ref 0.17–1.22)
MONOCYTES NFR BLD AUTO: 10 % (ref 4–12)
NEUTROPHILS # BLD AUTO: 7.5 THOUSANDS/ΜL (ref 1.85–7.62)
NEUTS SEG NFR BLD AUTO: 74 % (ref 43–75)
NITRITE UR QL STRIP: NEGATIVE
NON-SQ EPI CELLS URNS QL MICRO: ABNORMAL /HPF
NRBC BLD AUTO-RTO: 0 /100 WBCS
PH UR STRIP.AUTO: 7.5 [PH] (ref 4.5–8)
PLATELET # BLD AUTO: 309 THOUSANDS/UL (ref 149–390)
PMV BLD AUTO: 9.5 FL (ref 8.9–12.7)
POTASSIUM SERPL-SCNC: 3.8 MMOL/L (ref 3.5–5.3)
PROT UR STRIP-MCNC: NEGATIVE MG/DL
RBC # BLD AUTO: 3.93 MILLION/UL (ref 3.81–5.12)
RBC #/AREA URNS AUTO: ABNORMAL /HPF
SODIUM SERPL-SCNC: 146 MMOL/L (ref 136–145)
SP GR UR STRIP.AUTO: 1.01 (ref 1–1.03)
TROPONIN I SERPL-MCNC: <0.02 NG/ML
TROPONIN I SERPL-MCNC: <0.02 NG/ML
UROBILINOGEN UR QL STRIP.AUTO: 0.2 E.U./DL
WBC # BLD AUTO: 10.21 THOUSAND/UL (ref 4.31–10.16)
WBC #/AREA URNS AUTO: ABNORMAL /HPF

## 2019-10-15 PROCEDURE — 85025 COMPLETE CBC W/AUTO DIFF WBC: CPT | Performed by: EMERGENCY MEDICINE

## 2019-10-15 PROCEDURE — 80048 BASIC METABOLIC PNL TOTAL CA: CPT | Performed by: EMERGENCY MEDICINE

## 2019-10-15 PROCEDURE — 36415 COLL VENOUS BLD VENIPUNCTURE: CPT | Performed by: EMERGENCY MEDICINE

## 2019-10-15 PROCEDURE — 93005 ELECTROCARDIOGRAM TRACING: CPT

## 2019-10-15 PROCEDURE — 72125 CT NECK SPINE W/O DYE: CPT

## 2019-10-15 PROCEDURE — 84484 ASSAY OF TROPONIN QUANT: CPT | Performed by: INTERNAL MEDICINE

## 2019-10-15 PROCEDURE — 99220 PR INITIAL OBSERVATION CARE/DAY 70 MINUTES: CPT | Performed by: INTERNAL MEDICINE

## 2019-10-15 PROCEDURE — 81001 URINALYSIS AUTO W/SCOPE: CPT

## 2019-10-15 PROCEDURE — 99285 EMERGENCY DEPT VISIT HI MDM: CPT

## 2019-10-15 PROCEDURE — 99285 EMERGENCY DEPT VISIT HI MDM: CPT | Performed by: EMERGENCY MEDICINE

## 2019-10-15 PROCEDURE — 70450 CT HEAD/BRAIN W/O DYE: CPT

## 2019-10-15 PROCEDURE — 71046 X-RAY EXAM CHEST 2 VIEWS: CPT

## 2019-10-15 RX ORDER — GABAPENTIN 300 MG/1
600 CAPSULE ORAL 3 TIMES DAILY
Status: DISCONTINUED | OUTPATIENT
Start: 2019-10-15 | End: 2019-10-20 | Stop reason: HOSPADM

## 2019-10-15 RX ORDER — ACETAMINOPHEN 325 MG/1
488 TABLET ORAL EVERY 6 HOURS PRN
Status: DISCONTINUED | OUTPATIENT
Start: 2019-10-15 | End: 2019-10-20 | Stop reason: HOSPADM

## 2019-10-15 RX ORDER — FERROUS SULFATE 325(65) MG
325 TABLET ORAL
Status: DISCONTINUED | OUTPATIENT
Start: 2019-10-16 | End: 2019-10-20 | Stop reason: HOSPADM

## 2019-10-15 RX ORDER — DOCUSATE SODIUM 100 MG/1
100 CAPSULE, LIQUID FILLED ORAL 2 TIMES DAILY
Status: DISCONTINUED | OUTPATIENT
Start: 2019-10-15 | End: 2019-10-20 | Stop reason: HOSPADM

## 2019-10-15 RX ORDER — FENTANYL CITRATE 50 UG/ML
75 INJECTION, SOLUTION INTRAMUSCULAR; INTRAVENOUS
Status: DISCONTINUED | OUTPATIENT
Start: 2019-10-15 | End: 2019-10-16

## 2019-10-15 RX ORDER — LORAZEPAM 1 MG/1
2 TABLET ORAL 3 TIMES DAILY PRN
Status: DISCONTINUED | OUTPATIENT
Start: 2019-10-15 | End: 2019-10-20 | Stop reason: HOSPADM

## 2019-10-15 RX ORDER — LAMOTRIGINE 100 MG/1
200 TABLET ORAL DAILY
Status: DISCONTINUED | OUTPATIENT
Start: 2019-10-16 | End: 2019-10-20 | Stop reason: HOSPADM

## 2019-10-15 RX ORDER — MELATONIN
5000 DAILY
Status: DISCONTINUED | OUTPATIENT
Start: 2019-10-16 | End: 2019-10-20 | Stop reason: HOSPADM

## 2019-10-15 RX ORDER — ACETAMINOPHEN 325 MG/1
650 TABLET ORAL ONCE
Status: COMPLETED | OUTPATIENT
Start: 2019-10-15 | End: 2019-10-15

## 2019-10-15 RX ORDER — ZONISAMIDE 100 MG/1
200 CAPSULE ORAL DAILY
Status: DISCONTINUED | OUTPATIENT
Start: 2019-10-16 | End: 2019-10-20 | Stop reason: HOSPADM

## 2019-10-15 RX ORDER — MECLIZINE HCL 12.5 MG/1
12.5 TABLET ORAL EVERY 8 HOURS PRN
Status: DISCONTINUED | OUTPATIENT
Start: 2019-10-15 | End: 2019-10-20 | Stop reason: HOSPADM

## 2019-10-15 RX ORDER — METHOCARBAMOL 500 MG/1
1000 TABLET, FILM COATED ORAL ONCE
Status: COMPLETED | OUTPATIENT
Start: 2019-10-15 | End: 2019-10-15

## 2019-10-15 RX ORDER — FLUVOXAMINE MALEATE 50 MG/1
300 TABLET, COATED ORAL
Status: DISCONTINUED | OUTPATIENT
Start: 2019-10-15 | End: 2019-10-16

## 2019-10-15 RX ADMIN — FENTANYL CITRATE 75 MCG: 50 INJECTION INTRAMUSCULAR; INTRAVENOUS at 18:22

## 2019-10-15 RX ADMIN — APIXABAN 5 MG: 5 TABLET, FILM COATED ORAL at 21:00

## 2019-10-15 RX ADMIN — ACETAMINOPHEN 650 MG: 325 TABLET ORAL at 16:32

## 2019-10-15 RX ADMIN — GABAPENTIN 600 MG: 300 CAPSULE ORAL at 21:00

## 2019-10-15 RX ADMIN — DOCUSATE SODIUM 100 MG: 100 CAPSULE, LIQUID FILLED ORAL at 21:00

## 2019-10-15 RX ADMIN — METHOCARBAMOL TABLETS 1000 MG: 500 TABLET, COATED ORAL at 16:32

## 2019-10-15 RX ADMIN — FLUVOXAMINE MALEATE 300 MG: 50 TABLET, FILM COATED ORAL at 22:29

## 2019-10-15 NOTE — ED PROVIDER NOTES
History  Chief Complaint   Patient presents with    Fall     Patient presents via EMS, fell onto carpet just prior to arrival  Crato of what caused fall; unsure of LOC  Is on Eliquis  Reports total body pain  40-year-old female with a history of vertigo and ambulatory dysfunction states that she fell today because she got dizzy while she was walking with her walker  She states that she fell backwards landing flat on her back and now she has headache with radiating pain down her neck to her shoulders  She also has some upper back pain  She denies any new numbness or tingling  The patient states that she may have had a brief loss of consciousness after the fall however she is unsure  She is also on Eliquis for the treatment of DVT  She denies any associated chest pain, pressure, shortness of breath  She states that she took meclizine for her vertigo however she was still dizzy when she was walking  Prior to Admission Medications   Prescriptions Last Dose Informant Patient Reported? Taking?    ARIPiprazole (ABILIFY) 15 mg tablet 10/15/2019 at Unknown time  Yes Yes   Sig: Take 15 mg by mouth daily   Erenumab-aooe (AIMOVIG) 140 MG/ML SOAJ Past Month at Unknown time  Yes Yes   Sig: Inject 140 mg under the skin every 30 (thirty) days    LORazepam (ATIVAN) 1 mg tablet Past Month at Unknown time  Yes Yes   Sig: Take 2 mg by mouth 3 (three) times a day as needed    acetaminophen (TYLENOL) 500 mg tablet Past Month at Unknown time  No Yes   Sig: Take 1 tablet (500 mg total) by mouth every 6 (six) hours as needed (pain)   apixaban (ELIQUIS) 5 mg 10/15/2019 at Unknown time  Yes Yes   Sig: Take 5 mg by mouth 2 (two) times a day   cholecalciferol (VITAMIN D3) 1,000 units tablet 10/15/2019 at Unknown time  Yes Yes   Sig: Take 5,000 Units by mouth daily   eszopiclone (LUNESTA) 3 MG tablet 10/15/2019 at Unknown time  Yes Yes   Sig: Take 3 mg by mouth daily   ferrous sulfate 325 (65 Fe) mg tablet 10/15/2019 at Unknown time  Yes Yes   Sig: Take 325 mg by mouth daily with breakfast   fluvoxaMINE (LUVOX) 100 mg tablet 10/14/2019 at Unknown time  Yes Yes   Sig: Take 300 mg by mouth daily at bedtime     gabapentin (NEURONTIN) 600 MG tablet 10/15/2019 at Unknown time  Yes Yes   Sig: Take 600 mg by mouth 3 (three) times a day    haloperidol (HALDOL) 5 mg tablet Past Month at Unknown time  Yes Yes   Sig: Take one half tab orally twice a day as needed for headache or nausea  Max 2 tabs/day, 3 days/week   lamoTRIgine (LaMICtal) 200 MG tablet 10/15/2019 at Unknown time  Yes Yes   Sig: Take 200 mg by mouth daily     meclizine (ANTIVERT) 12 5 MG tablet 10/15/2019 at Unknown time  No Yes   Sig: Take 1 tablet (12 5 mg total) by mouth every 8 (eight) hours as needed for dizziness   mexiletine (MEXITIL) 150 mg capsule 10/15/2019 at Unknown time  Yes Yes   Sig: Take 150 mg by mouth 3 (three) times a day   ondansetron (ZOFRAN) 4 mg tablet Past Month at Unknown time  No Yes   Sig: Take 1 tablet (4 mg total) by mouth every 8 (eight) hours as needed for nausea or vomiting   zonisamide (ZONEGRAN) 100 mg capsule 10/15/2019 at Unknown time  Yes Yes   Sig: Take 200 mg by mouth daily      Facility-Administered Medications: None       Past Medical History:   Diagnosis Date    Adrenal insufficiency (Chipley's disease) ([facility] [address] )     Bipolar disorder     Cervical radiculopathy     Chronic back pain     DVT, lower extremity ([facility] [address] ) 1991    Fibromyalgia     History of TIAs     cannot remember details    Hypertension     Hypokalemia     Migraine     MRSA (methicillin resistant Staphylococcus aureus) 07/20/2012    nasal swab negative 4/5/19    Psychiatric disorder     Anxiety, major depression, bipolar    Spinal stenosis     Syncope 2014    orthostatic hypotension       Past Surgical History:   Procedure Laterality Date    APPENDECTOMY      GASTRIC RESTRICTION SURGERY      Gastric Sleeve Nov 2015    HYSTERECTOMY      JOINT REPLACEMENT      Left Knee 2011 and Right Hip 2010       Family History   Problem Relation Age of Onset    Breast cancer Mother     Migraines Mother     Arthritis Mother     Kidney disease Father     Heart disease Father     Diabetes Father     Arthritis Father     Brain cancer Brother     Seizures Brother      I have reviewed and agree with the history as documented  Social History     Tobacco Use    Smoking status: Former Smoker     Packs/day: 0 20     Types: Cigarettes     Last attempt to quit:      Years since quittin 8    Smokeless tobacco: Never Used   Substance Use Topics    Alcohol use: Not Currently     Frequency: Never     Binge frequency: Never    Drug use: Never        Review of Systems   Constitutional: Negative for chills, fatigue and fever  HENT: Negative for sore throat  Eyes: Negative for visual disturbance  Respiratory: Negative for shortness of breath  Cardiovascular: Negative for chest pain  Gastrointestinal: Negative for abdominal pain, diarrhea, nausea and vomiting  Genitourinary: Negative for difficulty urinating, dysuria and pelvic pain  Musculoskeletal: Positive for back pain, gait problem and neck pain  Skin: Negative for rash  Neurological: Positive for dizziness and headaches  Negative for syncope and weakness  All other systems reviewed and are negative  Physical Exam  Physical Exam   Constitutional: She is oriented to person, place, and time  She appears well-developed and well-nourished  No distress  HENT:   Head: Normocephalic and atraumatic  Head is without raccoon's eyes and without Caballero's sign  Right Ear: Tympanic membrane and external ear normal  Tympanic membrane is not perforated  No hemotympanum  Left Ear: Tympanic membrane and external ear normal  Tympanic membrane is not perforated  No hemotympanum  Nose: No nasal septal hematoma  Mouth/Throat: Oropharynx is clear and moist    Eyes: Pupils are equal, round, and reactive to light  Conjunctivae and EOM are normal    Neck: Normal range of motion and full passive range of motion without pain  Neck supple  Spinous process tenderness and muscular tenderness present  Cardiovascular: Normal rate, regular rhythm and normal heart sounds  No murmur heard  Pulmonary/Chest: Effort normal and breath sounds normal  No respiratory distress  Abdominal: Soft  There is no tenderness  There is no rebound and no guarding  Musculoskeletal: Normal range of motion  She exhibits no tenderness  Neurological: She is alert and oriented to person, place, and time  No cranial nerve deficit  Skin: Skin is warm and dry  Psychiatric: She has a normal mood and affect         Vital Signs  ED Triage Vitals   Temperature Pulse Respirations Blood Pressure SpO2   10/15/19 1447 10/15/19 1447 10/15/19 1447 10/15/19 1447 10/15/19 1447   98 2 °F (36 8 °C) 81 18 (!) 185/75 98 %      Temp Source Heart Rate Source Patient Position - Orthostatic VS BP Location FiO2 (%)   10/16/19 0800 10/15/19 1447 10/15/19 1447 10/15/19 1447 --   Temporal Monitor Lying Right arm       Pain Score       10/15/19 1447       8           Vitals:    10/17/19 0700 10/17/19 1515 10/17/19 2233 10/18/19 0740   BP: 118/66 146/72 131/78 147/84   Pulse: 66 78 63 74   Patient Position - Orthostatic VS: Sitting Sitting Lying Sitting         Visual Acuity  Visual Acuity      Most Recent Value   L Pupil Size (mm)  3   R Pupil Size (mm)  3          ED Medications  Medications   acetaminophen (TYLENOL) tablet 488 mg (488 mg Oral Given 10/18/19 0850)   apixaban (ELIQUIS) tablet 5 mg (5 mg Oral Given 10/18/19 0850)   cholecalciferol (VITAMIN D3) tablet 5,000 Units (5,000 Units Oral Given 10/18/19 0850)   ferrous sulfate tablet 325 mg (325 mg Oral Given 10/18/19 0850)   gabapentin (NEURONTIN) capsule 600 mg (600 mg Oral Given 10/18/19 0850)   lamoTRIgine (LaMICtal) tablet 200 mg (200 mg Oral Given 10/18/19 0851)   LORazepam (ATIVAN) tablet 2 mg (2 mg Oral Given 10/18/19 0850)   meclizine (ANTIVERT) tablet 12 5 mg (has no administration in time range)   zonisamide (ZONEGRAN) capsule 200 mg (200 mg Oral Given 10/18/19 0853)   docusate sodium (COLACE) capsule 100 mg (100 mg Oral Given 10/18/19 0850)   traMADol (ULTRAM) tablet 50 mg (50 mg Oral Given 10/16/19 0243)   ondansetron (ZOFRAN) injection 4 mg (4 mg Intravenous Given 10/18/19 0521)   lidocaine (LIDODERM) 5 % patch 1 patch (1 patch Topical Medication Applied 10/18/19 0848)   ARIPiprazole (ABILIFY) tablet 7 5 mg (7 5 mg Oral Given 10/18/19 0850)   fluvoxaMINE (LUVOX) tablet 150 mg (150 mg Oral Given 10/17/19 2120)   acetaminophen (TYLENOL) tablet 650 mg (650 mg Oral Given 10/15/19 1632)   methocarbamol (ROBAXIN) tablet 1,000 mg (1,000 mg Oral Given 10/15/19 1632)       Diagnostic Studies  Results Reviewed     Procedure Component Value Units Date/Time    Basic metabolic panel [[id number]]  (Abnormal) Collected:  10/16/19 0448    Lab Status:  Final result Specimen:  Blood from Arm, Right Updated:  10/16/19 0549     Sodium 143 mmol/L      Potassium 4 2 mmol/L      Chloride 112 mmol/L      CO2 24 mmol/L      ANION GAP 7 mmol/L      BUN 11 mg/dL      Creatinine 0 65 mg/dL      Glucose 91 mg/dL      Calcium 8 6 mg/dL      eGFR 97 ml/min/1 73sq m     Narrative:       Rudi guidelines for Chronic Kidney Disease (CKD):     Stage 1 with normal or high GFR (GFR > 90 mL/min/1 73 square meters)    Stage 2 Mild CKD (GFR = 60-89 mL/min/1 73 square meters)    Stage 3A Moderate CKD (GFR = 45-59 mL/min/1 73 square meters)    Stage 3B Moderate CKD (GFR = 30-44 mL/min/1 73 square meters)    Stage 4 Severe CKD (GFR = 15-29 mL/min/1 73 square meters)    Stage 5 End Stage CKD (GFR <15 mL/min/1 73 square meters)  Note: GFR calculation is accurate only with a steady state creatinine    CBC [[id number]]  (Abnormal) Collected:  10/16/19 0448    Lab Status:  Final result Specimen:  Blood from Arm, Right Updated: 10/16/19 0521     WBC 7 04 Thousand/uL      RBC 3 74 Million/uL      Hemoglobin 11 6 g/dL      Hematocrit 37 3 %       fL      MCH 31 0 pg      MCHC 31 1 g/dL      RDW 17 5 %      Platelets 337 Thousands/uL      MPV 10 2 fL     Troponin I [[id number]]  (Normal) Collected:  10/16/19 0209    Lab Status:  Final result Specimen:  Blood from Arm, Left Updated:  10/16/19 0235     Troponin I <0 02 ng/mL     Troponin I [[id number]]  (Normal) Collected:  10/15/19 2208    Lab Status:  Final result Specimen:  Blood from Arm, Right Updated:  10/15/19 2233     Troponin I <0 02 ng/mL     Troponin I [[id number]]  (Normal) Collected:  10/15/19 1857    Lab Status:  Final result Specimen:  Blood from Arm, Left Updated:  10/15/19 1933     Troponin I <0 02 ng/mL     Urine Microscopic [[id number]]  (Abnormal) Collected:  10/15/19 1859    Lab Status:  Final result Specimen:  Urine, Clean Catch Updated:  10/15/19 1930     RBC, UA 0-1 /hpf      WBC, UA 0-1 /hpf      Epithelial Cells Occasional /hpf      Bacteria, UA Occasional /hpf      Ca Oxalate [place], UA Occasional /hpf     POCT urinalysis dipstick [[id number]]  (Abnormal) Resulted:  10/15/19 1901    Lab Status:  Final result Specimen:  Urine Updated:  10/15/19 1901    ED Urine Macroscopic [[id number]]  (Abnormal) Collected:  10/15/19 1859    Lab Status:  Final result Specimen:  Urine Updated:  10/15/19 1901     Color, UA Yellow     Clarity, UA Clear     pH, UA 7 5     Leukocytes, UA Trace     Nitrite, UA Negative     Protein, UA Negative mg/dl      Glucose, UA Negative mg/dl      Ketones, UA Negative mg/dl      Urobilinogen, UA 0 2 E U /dl      Bilirubin, UA Negative     Blood, UA Negative     Specific Gravity, UA 1 015    Narrative:       CLINITEK RESULT    Basic metabolic panel [[id number]]  (Abnormal) Collected:  10/15/19 1800    Lab Status:  Final result Specimen:  Blood from Arm, Right Updated:  10/15/19 1833     Sodium 146 mmol/L      Potassium 3 8 mmol/L      Chloride 110 mmol/L CO2 29 mmol/L      ANION GAP 7 mmol/L      BUN 10 mg/dL      Creatinine 0 73 mg/dL      Glucose 98 mg/dL      Calcium 9 1 mg/dL      eGFR 90 ml/min/1 73sq m     Narrative:       National Kid[employer] Disease Foundation guidelines for Chronic Kidney Disease (CKD):     Stage 1 with normal or high GFR (GFR > 90 mL/min/1 73 square meters)    Stage 2 Mild CKD (GFR = 60-89 mL/min/1 73 square meters)    Stage 3A Moderate CKD (GFR = 45-59 mL/min/1 73 square meters)    Stage 3B Moderate CKD (GFR = 30-44 mL/min/1 73 square meters)    Stage 4 Severe CKD (GFR = 15-29 mL/min/1 73 square meters)    Stage 5 End Stage CKD (GFR <15 mL/min/1 73 square meters)  Note: GFR calculation is accurate only with a steady state creatinine    CBC and differential [[id number]]  (Abnormal) Collected:  10/15/19 1800    Lab Status:  Final result Specimen:  Blood from Arm, Right Updated:  10/15/19 1814     WBC 10 21 Thousand/uL      RBC 3 93 Million/uL      Hemoglobin 12 2 g/dL      Hematocrit 37 4 %      MCV 95 fL      MCH 31 0 pg      MCHC 32 6 g/dL      RDW 17 1 %      MPV 9 5 fL      Platelets 931 Thousands/uL      nRBC 0 /100 WBCs      Neutrophils Relative 74 %      Immat GRANS % 0 %      Lymphocytes Relative 16 %      Monocytes Relative 10 %      Eosinophils Relative 0 %      Basophils Relative 0 %      Neutrophils Absolute 7 50 Thousands/µL      Immature Grans Absolute 0 04 Thousand/uL      Lymphocytes Absolute 1 58 Thousands/µL      Monocytes Absolute 1 01 Thousand/µL      Eosinophils Absolute 0 04 Thousand/µL      Basophils Absolute 0 04 Thousands/µL                  MRI brain IAC wo contrast   Final Result by Dedrick Sam MD (10/17 9041)      1  Grossly unremarkable study; however, cannot entirely exclude tiny IAC or inner ear lesions given lack of IV contrast       2   Scattered periventricular and subcortical white matter T2/FLAIR hyperintense foci, which are nonspecific, but most consistent with mild chronic microangiopathy  Workstation performed: [id number]         XR chest 2 views   ED Interpretation by Jose Enrique Templeton DO (10/15 1624)   No acute cardiopulmonary pathology  Unchanged from previous chest x-ray      Final Result by Ricardo Cerda MD (10/15 1630)      No acute cardiopulmonary disease  Workstation performed: [id number]         CT head without contrast   Final Result by Georgia Giordano MD (10/15 1615)      No acute intracranial abnormality  Workstation performed: [id number]         CT cervical spine without contrast   Final Result by Georgia Giordano MD (10/15 1619)      No cervical spine fracture or traumatic malalignment  Workstation performed: [id number]         XR shoulder 2+ vw right    (Results Pending)              Procedures  Procedures       ED Course  ED Course as of Oct 18 0947   Tue Oct 15, 2019   1632 Patient is stable upon re-evaluation  The plan is for Tylenol and Robaxin                                  MDM  Number of Diagnoses or Management Options  Ambulatory dysfunction:   Fall, initial encounter:   Intractable pain:   Neck pain:   Diagnosis management comments: Plan is to obtain imaging to rule out clinically significant traumatic injury  pain control and trial of ambulation  The imaging was reviewed  There is no evidence of acute clinically significant traumatic injury  The patient was given pain medication and attempt to walk walker however she was a made to do this and there is concern for safety as well as the ability for the patient to complete her ADLs    The patient continues intractable pain and will be admitted to the hospital   The case was discussed with Internal Medicine for admission       Amount and/or Complexity of Data Reviewed  Clinical lab tests: reviewed and ordered  Tests in the radiology section of CPT®: ordered and reviewed  Review and summarize past medical records: yes  Discuss the patient with other providers: yes  Independent visualization of images, tracings, or specimens: yes        Disposition  Final diagnoses:   Fall, initial encounter   Neck pain   Ambulatory dysfunction   Intractable pain     Time reflects when diagnosis was documented in both MDM as applicable and the Disposition within this note     Time User Action Codes Description Comment    10/15/2019  5:54 PM Agustín Perez Add [Y77  ZAWM] Fall, initial encounter     10/15/2019  5:54 PM Shelia Garrett B Add [M54 2] Neck pain     10/15/2019  5:54 PM Agustín Perez Add [R26 2] Ambulatory dysfunction     10/15/2019  5:55 PM Shelia Garrett B Add [R52] Intractable pain     10/15/2019  6:14 PM Christine Carranza Add [F31 9] Bipolar depression ([address] )     10/15/2019  6:15 PM Christine Carranza Add [Z86 73] History of TIAs     10/15/2019  6:17 PM Christine Carranza Add [R42] Dizziness       ED Disposition     ED Disposition Condition Date/Time Comment    Admit Stable Tue Oct 15, 2019  5:54 PM Case was discussed with Dr Gilbert Montenegro and the patient's admission status was agreed to be Admission Status: inpatient status to the service of Dr Gilbert Montenegro   Follow-up Information    None         Current Discharge Medication List      CONTINUE these medications which have NOT CHANGED    Details   acetaminophen (TYLENOL) 500 mg tablet Take 1 tablet (500 mg total) by mouth every 6 (six) hours as needed (pain)  Qty: 30 tablet, Refills: 0    Associated Diagnoses: Fall, initial encounter;  Injury of head, initial encounter      apixaban (ELIQUIS) 5 mg Take 5 mg by mouth 2 (two) times a day      ARIPiprazole (ABILIFY) 15 mg tablet Take 15 mg by mouth daily      cholecalciferol (VITAMIN D3) 1,000 units tablet Take 5,000 Units by mouth daily      Erenumab-aooe (AIMOVIG) 140 MG/ML SOAJ Inject 140 mg under the skin every 30 (thirty) days       eszopiclone (LUNESTA) 3 MG tablet Take 3 mg by mouth daily      ferrous sulfate 325 (65 Fe) mg tablet Take 325 mg by mouth daily with breakfast      fluvoxaMINE (LUVOX) 100 mg tablet Take 300 mg by mouth daily at bedtime        gabapentin (NEURONTIN) 600 MG tablet Take 600 mg by mouth 3 (three) times a day       haloperidol (HALDOL) 5 mg tablet Take one half tab orally twice a day as needed for headache or nausea  Max 2 tabs/day, 3 days/week      lamoTRIgine (LaMICtal) 200 MG tablet Take 200 mg by mouth daily  LORazepam (ATIVAN) 1 mg tablet Take 2 mg by mouth 3 (three) times a day as needed       meclizine (ANTIVERT) 12 5 MG tablet Take 1 tablet (12 5 mg total) by mouth every 8 (eight) hours as needed for dizziness  Qty: 60 tablet, Refills: 0    Associated Diagnoses: Dizziness      mexiletine (MEXITIL) 150 mg capsule Take 150 mg by mouth 3 (three) times a day      ondansetron (ZOFRAN) 4 mg tablet Take 1 tablet (4 mg total) by mouth every 8 (eight) hours as needed for nausea or vomiting  Qty: 60 tablet, Refills: 2    Associated Diagnoses: Dizziness      zonisamide (ZONEGRAN) 100 mg capsule Take 200 mg by mouth daily           No discharge procedures on file      ED Provider  Electronically Signed by           Janak Clements DO  10/18/19 8539

## 2019-10-15 NOTE — ASSESSMENT & PLAN NOTE
On Eliquis for history of PE/DVT     Does not appear to have any notable head injury    Resume current dosing for now

## 2019-10-15 NOTE — ED NOTES
Attempted ambulation trial at this time  Patient was able to sit up on side of bed, stand slowly, however immediately reported "I'm just too dizzy    I feel like I'm going to fall"  Reports generalized pains and bodyaches  Patient sat back down with assistance of writer, given a minute, however reported that symptoms did not improve at all after briefly resting  Dr Laura Tyler made aware            Lukas Morris, LELE  10/15/19 3896

## 2019-10-15 NOTE — ED NOTES
Patient's room air saturation 90% following fentanyl administration  Remains easily arousable, with immediate improvement in O2 following supplemental O2  Placed on 2L via NC  Reports pain is now a 2/10          Yessenia Nix RN  10/15/19 4846

## 2019-10-15 NOTE — ASSESSMENT & PLAN NOTE
Patient with fall with unknown time loss of consciousness  Neurologic checks  High risk encounter given she in on Eliquis  Initially CT by the ED read as negative for bleed  REPEAT HEAD CT FOR ANY NEW Neurologic changes

## 2019-10-15 NOTE — PROGRESS NOTES
Progress Note - Claude Hodges 1960, 59 y o  female MRN: [id number]    Unit/Bed#: ED 16 Encounter: [id number]    Primary Care Provider: Arthur Santizo DO   Date and time admitted to hospital: 10/15/2019  2:43 PM        Closed head injury with brief loss of consciousness [facility])  Assessment & Plan  Patient with fall with unknown time loss of consciousness  Neurologic checks  High risk encounter given she in on Eliquis  Initially CT by the ED read as negative for bleed  REPEAT HEAD CT FOR ANY NEW Neurologic changes  Ambulatory dysfunction  Assessment & Plan  Patient reports weakness - ongoing    PT/OT/CM consultation placed    PE (pulmonary thromboembolism) ([facility] [address] )  Assessment & Plan  On Eliquis for history of PE/DVT     Does not appear to have any notable head injury    Resume current dosing for now    History of TIAs  Assessment & Plan  MRI of the Brain ordered to rule out cerbellar etiology    Neurologic changes  Migraine  Assessment & Plan  Continue current regimen  No active migraine  Previous admission beginning of October, followed by [facility]    10/02- 10/07 - Previous admission   She was given three day course of valproic acid and magnesium    Osteoarthritis of lumbosacral spine without myelopathy  Assessment & Plan  Known OA  PT/OT evals      Fibromyalgia  Assessment & Plan  Recommend gated exercise program as an outpatient    Bipolar depression [facility])  Assessment & Plan  Psychiatric consultation placed  Suspect underlying psychiatric component       Assessment: This is a 56-year-old female patient with a significant psychiatric history who now presents with dizziness and intractable vertigo  Her labs are otherwise unremarkable and normal imaging additionally with no acute abnormality  I suspect some component of psychiatric disorder but given her other risk factors would be reasonable to rule out potential myocardial infarction as well as acute neurologic change    Physical therapy and occupational therapy consultations have been placed  It is questionable if the syncopal episode did occur, given the fact that she is on Eliquis PE is highly unlikely  Additionally she endorses no chest pain  Will order serial set of cardiac enzymes, EKG to be obtained  Would recommend repeat head CT for any new neurologic changes  VTE Prophylaxis: Apixaban (Eliquis)  / sequential compression device   Code Status:  Full  POLST: There is no POLST form on file for this patient (pre-hospital)  Discussion with family:  Patient    Anticipated Length of Stay:  Patient will be admitted on an Observation basis with an anticipated length of stay of  less than 2 midnights  Justification for Hospital Stay:  Intractable vertigo    Total Time for Visit, including Counseling / Coordination of Care: 30 minutes  Greater than 50% of this total time spent on direct patient counseling and coordination of care  Chief Complaint:   Vertigo and fall with questionable loss of consciousness    History of Present Illness:    Lari Cogan is a 59 y o  female who presents with past medical history of previous DVT and pulmonary embolism maintained on Eliquis  She presents emergency room after having a fall due to vertigo  Details of her history are unclear, she reports confusion for approximately 1 week  She was recently admitted at the beginning of the month for migraine headache High requiring inpatient admission  She reports she feels weak all over, no significant difficulty with swallowing, no chest pain no tongue biting  She has had no recent medication changes  She is typically followed in the outpatient setting by [facility] and has previously received Botox injections in the past   She denies any abdominal pain nausea vomiting  In the emergency room a CT of the head was performed which was reported by the emergency room physician to show no acute bleed    The patient has no visible trauma of her head, nor does she endorse any focal areas of bleeding  She states good medication compliance  She reports that her dizziness is not improved despite meclizine  She states that this dizziness is unrelated to her typical migraine headaches  She denies any other travel or tripped history  She does report a previous history of TIA, however the symptoms are quite different  Review of Systems:    A complete and comprehensive 14 point organ system review has been performed and all other systems are negative other than stated above  Past Medical and Surgical History:     Past Medical History:   Diagnosis Date    Adrenal insufficiency (Barron's disease) ([facility] [address] )     Bipolar disorder     Cervical radiculopathy     Chronic back pain     DVT, lower extremity ([facility] [address] ) 1991    Fibromyalgia     History of TIAs     cannot remember details    Hypertension     Hypokalemia     Migraine     MRSA (methicillin resistant Staphylococcus aureus) 07/20/2012    nasal swab negative 4/5/19    Psychiatric disorder     Anxiety, major depression, bipolar    Spinal stenosis     Syncope 2014    orthostatic hypotension       Past Surgical History:   Procedure Laterality Date    APPENDECTOMY      GASTRIC RESTRICTION SURGERY      Gastric Sleeve Nov 2015    HYSTERECTOMY      JOINT REPLACEMENT      Left Knee 2011 and Right Hip 2010       Meds/Allergies:    Prior to Admission medications    Medication Sig Start Date End Date Taking?  Authorizing Provider   acetaminophen (TYLENOL) 500 mg tablet Take 1 tablet (500 mg total) by mouth every 6 (six) hours as needed (pain) 7/30/19  Yes Cheryle Smolder, PA-C   apixaban (ELIQUIS) 5 mg Take 5 mg by mouth 2 (two) times a day   Yes Historical Provider, MD   ARIPiprazole (ABILIFY) 15 mg tablet Take 15 mg by mouth daily   Yes Historical Provider, MD   cholecalciferol (VITAMIN D3) 1,000 units tablet Take 5,000 Units by mouth daily   Yes Historical Provider, MD [facility] (AIMOVIG) 140 MG/ML SOAJ Inject 140 mg under the skin every 30 (thirty) days  3/22/19  Yes Historical Provider, MD   eszopiclone (LUNESTA) 3 MG tablet Take 3 mg by mouth daily   Yes Historical Provider, MD   ferrous sulfate 325 (65 Fe) mg tablet Take 325 mg by mouth daily with breakfast   Yes Historical Provider, MD   fluvoxaMINE (LUVOX) 100 mg tablet Take 300 mg by mouth daily at bedtime     Yes Historical Provider, MD   gabapentin (NEURONTIN) 600 MG tablet Take 600 mg by mouth 3 (three) times a day  Yes Historical Provider, MD   haloperidol (HALDOL) 5 mg tablet Take one half tab orally twice a day as needed for headache or nausea  Max 2 tabs/day, 3 days/week 5/21/19  Yes Historical Provider, MD   lamoTRIgine (LaMICtal) 200 MG tablet Take 200 mg by mouth daily  Yes Historical Provider, MD   LORazepam (ATIVAN) 1 mg tablet Take 2 mg by mouth 3 (three) times a day as needed    Yes Historical Provider, MD   meclizine (ANTIVERT) 12 5 MG tablet Take 1 tablet (12 5 mg total) by mouth every 8 (eight) hours as needed for dizziness 10/7/19  Yes Reynold Gregg DO   mexiletine (MEXITIL) 150 mg capsule Take 150 mg by mouth 3 (three) times a day   Yes Historical Provider, MD   ondansetron (ZOFRAN) 4 mg tablet Take 1 tablet (4 mg total) by mouth every 8 (eight) hours as needed for nausea or vomiting 10/7/19  Yes Reynold Gregg DO   zonisamide (ZONEGRAN) 100 mg capsule Take 200 mg by mouth daily   Yes Historical Provider, MD     I have reviewed home medications with a medical source (PCP, Pharmacy, other)  Allergies:    Allergies   Allergen Reactions    Ketorolac Itching and Other (See Comments)     [toradol] Itching, hives    Mushroom Extract Complex        Social History:     Marital Status: /Civil Union   Occupation:  Unknown  Patient Pre-hospital Living Situation:  With     Substance Use History:   Social History     Substance and Sexual Activity   Alcohol Use Not Currently    Frequency: Never    Binge frequency: Never     Social History     Tobacco Use   Smoking Status Former Smoker    Packs/day: 0 20    Types: Cigarettes    Last attempt to quit:     Years since quittin 7   Smokeless Tobacco Never Used     Social History     Substance and Sexual Activity   Drug Use Never       Family History:    non-contributory    Physical Exam:     Vitals:   Blood Pressure: 140/70 (10/15/19 1819)  Pulse: 77 (10/15/19 1819)  Temperature: 98 2 °F (36 8 °C) (10/15/19 1447)  Respirations: 18 (10/15/19 1819)  SpO2: 97 % (10/15/19 1819)    General: well appearing, no acute distress  HEENT: atraumatic, PERRLA, moist mucosa, normal pharynx, normal tonsils and adenoids, normal tongue, no fluid in sinuses  Neck: Trachea midline, no carotid bruit, no masses  Respiratory: normal chest wall expansion, CTA B, no r/r/w, no rubs  Cardiovascular: RRR, no m/r/g, Normal S1 and S2  Abdomen: Soft, non-tender, non-distended, normal bowel sounds in all quadrants, no hepatosplenomegaly, no tympany  Rectal: deferred  Musculoskeletal: normal ROM in upper and lower extremities  Integumentary: warm, dry, and pink, with no rash, purpura, or petechia  Heme/Lymph: no lymphadenopathy, no bruises  Neurological: Cranial Nerves II-XII grossly intact, no tics, normal sensation to pressure and light touch  Psychiatric: cooperative with normal mood, affect, and cognition      Additional Data:     Lab Results: I have personally reviewed pertinent reports        Results from last 7 days   Lab Units 10/15/19  1800   WBC Thousand/uL 10 21*   HEMOGLOBIN g/dL 12 2   HEMATOCRIT % 37 4   PLATELETS Thousands/uL 309   NEUTROS PCT % 74   LYMPHS PCT % 16   MONOS PCT % 10   EOS PCT % 0     Results from last 7 days   Lab Units 10/15/19  1800   SODIUM mmol/L 146*   POTASSIUM mmol/L 3 8   CHLORIDE mmol/L 110*   CO2 mmol/L 29   BUN mg/dL 10   CREATININE mg/dL 0 73   ANION GAP mmol/L 7   CALCIUM mg/dL 9 1   GLUCOSE RANDOM mg/dL 98                       Imaging: I have personally reviewed pertinent reports  XR chest 2 views   ED Interpretation by Mela Slater DO (10/15 1624)   No acute cardiopulmonary pathology  Unchanged from previous chest x-ray      Final Result by Rock Ozzie MD (10/15 1630)      No acute cardiopulmonary disease  Workstation performed: [id number]         CT head without contrast   Final Result by Corrina Wetzel MD (10/15 1615)      No acute intracranial abnormality  Workstation performed: [id number]         CT cervical spine without contrast   Final Result by Corrina Wetzel MD (10/15 1619)      No cervical spine fracture or traumatic malalignment  Workstation performed: [id number]         MRI inpatient order    (Results Pending)       EKG, Pathology, and Other Studies Reviewed on Admission:   · EKG: Pending    Allscripts / Epic Records Reviewed: Yes     ** Please Note: This note has been constructed using a voice recognition system   **

## 2019-10-15 NOTE — ASSESSMENT & PLAN NOTE
Continue current regimen  No active migraine      Previous admission beginning of October, followed by [facility]    10/02- 10/07 - Previous admission   She was given three day course of valproic acid and magnesium

## 2019-10-16 ENCOUNTER — APPOINTMENT (OUTPATIENT)
Dept: MRI IMAGING | Facility: HOSPITAL | Age: 59
DRG: 149 | End: 2019-10-16
Payer: COMMERCIAL

## 2019-10-16 ENCOUNTER — APPOINTMENT (OUTPATIENT)
Dept: RADIOLOGY | Facility: HOSPITAL | Age: 59
DRG: 149 | End: 2019-10-16
Payer: COMMERCIAL

## 2019-10-16 PROBLEM — E66.01 MORBID OBESITY WITH BMI OF 40.0-44.9, ADULT (HCC): Status: ACTIVE | Noted: 2019-10-16

## 2019-10-16 PROBLEM — M25.519 SHOULDER PAIN: Status: ACTIVE | Noted: 2019-10-16

## 2019-10-16 LAB
ANION GAP SERPL CALCULATED.3IONS-SCNC: 7 MMOL/L (ref 4–13)
ATRIAL RATE: 76 BPM
BUN SERPL-MCNC: 11 MG/DL (ref 5–25)
CALCIUM SERPL-MCNC: 8.6 MG/DL (ref 8.3–10.1)
CHLORIDE SERPL-SCNC: 112 MMOL/L (ref 100–108)
CO2 SERPL-SCNC: 24 MMOL/L (ref 21–32)
CREAT SERPL-MCNC: 0.65 MG/DL (ref 0.6–1.3)
ERYTHROCYTE [DISTWIDTH] IN BLOOD BY AUTOMATED COUNT: 17.5 % (ref 11.6–15.1)
GFR SERPL CREATININE-BSD FRML MDRD: 97 ML/MIN/1.73SQ M
GLUCOSE SERPL-MCNC: 91 MG/DL (ref 65–140)
HCT VFR BLD AUTO: 37.3 % (ref 34.8–46.1)
HGB BLD-MCNC: 11.6 G/DL (ref 11.5–15.4)
MCH RBC QN AUTO: 31 PG (ref 26.8–34.3)
MCHC RBC AUTO-ENTMCNC: 31.1 G/DL (ref 31.4–37.4)
MCV RBC AUTO: 100 FL (ref 82–98)
P AXIS: 80 DEGREES
PLATELET # BLD AUTO: 308 THOUSANDS/UL (ref 149–390)
PMV BLD AUTO: 10.2 FL (ref 8.9–12.7)
POTASSIUM SERPL-SCNC: 4.2 MMOL/L (ref 3.5–5.3)
PR INTERVAL: 160 MS
QRS AXIS: -14 DEGREES
QRSD INTERVAL: 88 MS
QT INTERVAL: 374 MS
QTC INTERVAL: 420 MS
RBC # BLD AUTO: 3.74 MILLION/UL (ref 3.81–5.12)
SODIUM SERPL-SCNC: 143 MMOL/L (ref 136–145)
T WAVE AXIS: 9 DEGREES
TROPONIN I SERPL-MCNC: <0.02 NG/ML
VENTRICULAR RATE: 76 BPM
WBC # BLD AUTO: 7.04 THOUSAND/UL (ref 4.31–10.16)

## 2019-10-16 PROCEDURE — 73030 X-RAY EXAM OF SHOULDER: CPT

## 2019-10-16 PROCEDURE — G8987 SELF CARE CURRENT STATUS: HCPCS

## 2019-10-16 PROCEDURE — 85027 COMPLETE CBC AUTOMATED: CPT | Performed by: INTERNAL MEDICINE

## 2019-10-16 PROCEDURE — 93010 ELECTROCARDIOGRAM REPORT: CPT | Performed by: INTERNAL MEDICINE

## 2019-10-16 PROCEDURE — 80048 BASIC METABOLIC PNL TOTAL CA: CPT | Performed by: INTERNAL MEDICINE

## 2019-10-16 PROCEDURE — 99254 IP/OBS CNSLTJ NEW/EST MOD 60: CPT | Performed by: PSYCHIATRY & NEUROLOGY

## 2019-10-16 PROCEDURE — 70551 MRI BRAIN STEM W/O DYE: CPT

## 2019-10-16 PROCEDURE — 99226 PR SBSQ OBSERVATION CARE/DAY 35 MINUTES: CPT | Performed by: INTERNAL MEDICINE

## 2019-10-16 PROCEDURE — 84484 ASSAY OF TROPONIN QUANT: CPT | Performed by: INTERNAL MEDICINE

## 2019-10-16 PROCEDURE — G8988 SELF CARE GOAL STATUS: HCPCS

## 2019-10-16 PROCEDURE — 97163 PT EVAL HIGH COMPLEX 45 MIN: CPT

## 2019-10-16 PROCEDURE — G8978 MOBILITY CURRENT STATUS: HCPCS

## 2019-10-16 PROCEDURE — 99203 OFFICE O/P NEW LOW 30 MIN: CPT | Performed by: NURSE PRACTITIONER

## 2019-10-16 PROCEDURE — G8979 MOBILITY GOAL STATUS: HCPCS

## 2019-10-16 PROCEDURE — 97166 OT EVAL MOD COMPLEX 45 MIN: CPT

## 2019-10-16 RX ORDER — ONDANSETRON 2 MG/ML
4 INJECTION INTRAMUSCULAR; INTRAVENOUS EVERY 4 HOURS PRN
Status: DISCONTINUED | OUTPATIENT
Start: 2019-10-16 | End: 2019-10-20 | Stop reason: HOSPADM

## 2019-10-16 RX ORDER — LIDOCAINE 50 MG/G
1 PATCH TOPICAL DAILY
Status: DISCONTINUED | OUTPATIENT
Start: 2019-10-16 | End: 2019-10-20 | Stop reason: HOSPADM

## 2019-10-16 RX ORDER — FLUVOXAMINE MALEATE 50 MG/1
150 TABLET, COATED ORAL
Status: DISCONTINUED | OUTPATIENT
Start: 2019-10-16 | End: 2019-10-20 | Stop reason: HOSPADM

## 2019-10-16 RX ORDER — TRAMADOL HYDROCHLORIDE 50 MG/1
50 TABLET ORAL EVERY 6 HOURS PRN
Status: DISCONTINUED | OUTPATIENT
Start: 2019-10-16 | End: 2019-10-20 | Stop reason: HOSPADM

## 2019-10-16 RX ORDER — ARIPIPRAZOLE 5 MG/1
7.5 TABLET ORAL DAILY
Status: DISCONTINUED | OUTPATIENT
Start: 2019-10-17 | End: 2019-10-20 | Stop reason: HOSPADM

## 2019-10-16 RX ADMIN — FLUVOXAMINE MALEATE 150 MG: 50 TABLET, FILM COATED ORAL at 23:12

## 2019-10-16 RX ADMIN — ACETAMINOPHEN 488 MG: 325 TABLET ORAL at 11:32

## 2019-10-16 RX ADMIN — ONDANSETRON 4 MG: 2 INJECTION INTRAMUSCULAR; INTRAVENOUS at 09:07

## 2019-10-16 RX ADMIN — MELATONIN 5000 UNITS: at 09:28

## 2019-10-16 RX ADMIN — GABAPENTIN 600 MG: 300 CAPSULE ORAL at 17:12

## 2019-10-16 RX ADMIN — FERROUS SULFATE TAB 325 MG (65 MG ELEMENTAL FE) 325 MG: 325 (65 FE) TAB at 09:28

## 2019-10-16 RX ADMIN — APIXABAN 5 MG: 5 TABLET, FILM COATED ORAL at 17:12

## 2019-10-16 RX ADMIN — TRAMADOL HYDROCHLORIDE 50 MG: 50 TABLET, FILM COATED ORAL at 02:43

## 2019-10-16 RX ADMIN — DOCUSATE SODIUM 100 MG: 100 CAPSULE, LIQUID FILLED ORAL at 09:28

## 2019-10-16 RX ADMIN — ARIPIPRAZOLE 15 MG: 10 TABLET ORAL at 09:28

## 2019-10-16 RX ADMIN — APIXABAN 5 MG: 5 TABLET, FILM COATED ORAL at 09:28

## 2019-10-16 RX ADMIN — ACETAMINOPHEN 488 MG: 325 TABLET ORAL at 17:11

## 2019-10-16 RX ADMIN — LIDOCAINE 1 PATCH: 50 PATCH TOPICAL at 11:25

## 2019-10-16 RX ADMIN — DOCUSATE SODIUM 100 MG: 100 CAPSULE, LIQUID FILLED ORAL at 17:12

## 2019-10-16 RX ADMIN — ZONISAMIDE 200 MG: 100 CAPSULE ORAL at 09:28

## 2019-10-16 RX ADMIN — LAMOTRIGINE 200 MG: 100 TABLET ORAL at 09:28

## 2019-10-16 RX ADMIN — LORAZEPAM 2 MG: 1 TABLET ORAL at 17:11

## 2019-10-16 RX ADMIN — LORAZEPAM 2 MG: 1 TABLET ORAL at 09:30

## 2019-10-16 RX ADMIN — GABAPENTIN 600 MG: 300 CAPSULE ORAL at 09:28

## 2019-10-16 RX ADMIN — ONDANSETRON 4 MG: 2 INJECTION INTRAMUSCULAR; INTRAVENOUS at 17:16

## 2019-10-16 NOTE — PLAN OF CARE
Problem: OCCUPATIONAL THERAPY ADULT  Goal: Performs self-care activities at highest level of function for planned discharge setting  See evaluation for individualized goals  Description  Treatment Interventions: ADL retraining, Functional transfer training, UE strengthening/ROM, Endurance training, Patient/family training, Equipment evaluation/education, Compensatory technique education, Energy conservation, Activityengagement          See flowsheet documentation for full assessment, interventions and recommendations  Note:   Limitation: Decreased ADL status, Decreased UE strength, Decreased Safe judgement during ADL, Decreased endurance, Decreased self-care trans, Decreased high-level ADLs  Prognosis: Good  Assessment: Pt is a 59 y o  female seen for OT evaluation s/p adm to [facility] on 10/15/2019 w/ Vertigo, dizziness, and s/p fall  Comorbidities affecting pts functional performance include a significant PMH of Bipolar disorder, cervical radiculopathy, DVT, Fibromyalgia, HTN, Hypokalemia, Migraine, spinal stenosis, and syncope  Pt with active OT orders and activity orders for Activity as tolerated  Pt lives with spouse in a multi-level house with 3 BECCA and 1st floor setup  Pt (+) home alone during the day  At baseline, pt was I w/ ADLs and functional mobility/transfers w/ use of RW, required assist w/ IADLs, (+) , and (+) falls PTA  Upon evaluation, pt currently requires Supervision for UB ADLs, Mod-Min A for LB ADLs, Min A for toileting, Supervision for bed mobility, and Min A of 2 for functional mobility/transfers 2* the following deficits impacting occupational performance: weakness, decreased strength, decreased balance, decreased tolerance, decreased safety awareness and increased pain   These impairments, as well at pts steps to enter environment, limited home support, difficulty performing ADLS and difficulty performing IADLS  limit pts ability to safely engage in all baseline areas of occupation  Pt scored overall 50/100 on the Barthel Index  Pt to continue to benefit from continued acute OT services during hospital stay to address defined deficits and to maximize level of functional independence in the following Occupational Performance areas: grooming, bathing/shower, toilet hygiene, dressing, medication management, health maintenance, functional mobility, community mobility and clothing management  From OT standpoint, recommend STR upon D/C   OT will continue to follow pt 3-5x/wk to address the following goals to  w/in 10-14 days:     OT Discharge Recommendation: Short Term Rehab  OT - OK to Discharge: Yes(when medically cleared to rehab)

## 2019-10-16 NOTE — ASSESSMENT & PLAN NOTE
Patient with fall with unknown time loss of consciousness  Neurologic checks  High risk encounter given she in on Eliquis    Head CT negative for any bleed    REPEAT HEAD CT FOR ANY NEW Neurologic changes

## 2019-10-16 NOTE — QUICK NOTE
Primary Care Provider: Brandon Garcia DO   Date and time admitted to hospital: 10/15/2019  2:43 PM                                                   HISTORY AND PHYSICAL     Closed head injury with brief loss of consciousness [facility])  Assessment & Plan  Patient with fall with unknown time loss of consciousness  Neurologic checks  High risk encounter given she in on Eliquis  Initially CT by the ED read as negative for bleed      REPEAT HEAD CT FOR ANY NEW Neurologic changes      Ambulatory dysfunction  Assessment & Plan  Patient reports weakness - ongoing     PT/OT/CM consultation placed     PE (pulmonary thromboembolism) ([address] )  Assessment & Plan  On Eliquis for history of PE/DVT      Does not appear to have any notable head injury     Resume current dosing for now     History of TIAs  Assessment & Plan  MRI of the Brain ordered to rule out cerbellar etiology     Neurologic changes      Migraine  Assessment & Plan  Continue current regimen  No active migraine      Previous admission beginning of October, followed by [facility]     10/02- 10/07 - Previous admission   She was given three day course of valproic acid and magnesium     Osteoarthritis of lumbosacral spine without myelopathy  Assessment & Plan  Known OA  PT/OT evals        Fibromyalgia  Assessment & Plan  Recommend gated exercise program as an outpatient     Bipolar depression [facility])  Assessment & Plan  Psychiatric consultation placed      Suspect underlying psychiatric component         Assessment:     This is a 73-year-old female patient with a significant psychiatric history who now presents with dizziness and intractable vertigo  Her labs are otherwise unremarkable and normal imaging additionally with no acute abnormality  I suspect some component of psychiatric disorder but given her other risk factors would be reasonable to rule out potential myocardial infarction as well as acute neurologic change    Physical therapy and occupational therapy consultations have been placed  It is questionable if the syncopal episode did occur, given the fact that she is on Eliquis PE is highly unlikely  Additionally she endorses no chest pain  Will order serial set of cardiac enzymes, EKG to be obtained  Would recommend repeat head CT for any new neurologic changes               VTE Prophylaxis: Apixaban (Eliquis)  / sequential compression device   Code Status:  Full  POLST: There is no POLST form on file for this patient (pre-hospital)  Discussion with family:  Patient     Anticipated Length of Stay:  Patient will be admitted on an Observation basis with an anticipated length of stay of  less than 2 midnights  Justification for Hospital Stay:  Intractable vertigo     Total Time for Visit, including Counseling / Coordination of Care: 30 minutes  Greater than 50% of this total time spent on direct patient counseling and coordination of care      Chief Complaint:   Vertigo and fall with questionable loss of consciousness     History of Present Illness:     Maryan Puente is a 59 y o  female who presents with past medical history of previous DVT and pulmonary embolism maintained on Eliquis  She presents emergency room after having a fall due to vertigo  Details of her history are unclear, she reports confusion for approximately 1 week  She was recently admitted at the beginning of the month for migraine headache High requiring inpatient admission  She reports she feels weak all over, no significant difficulty with swallowing, no chest pain no tongue biting  She has had no recent medication changes  She is typically followed in the outpatient setting by [facility] and has previously received Botox injections in the past   She denies any abdominal pain nausea vomiting  In the emergency room a CT of the head was performed which was reported by the emergency room physician to show no acute bleed    The patient has no visible trauma of her head, nor does she endorse any focal areas of bleeding  She states good medication compliance  She reports that her dizziness is not improved despite meclizine  She states that this dizziness is unrelated to her typical migraine headaches  She denies any other travel or tripped history  She does report a previous history of TIA, however the symptoms are quite different      Review of Systems:     A complete and comprehensive 14 point organ system review has been performed and all other systems are negative other than stated above  Past Medical and Surgical History:      Medical History        Past Medical History:   Diagnosis Date    Adrenal insufficiency ([place] disease) [facility] [address] [facility]      Bipolar disorder      Cervical radiculopathy      Chronic back pain      [facility]    Fibromyalgia      History of TIAs       cannot remember details    Hypertension      Hypokalemia      Migraine      MRSA (methicillin resistant Staphylococcus aureus) 07/20/2012     nasal swab negative 4/5/19    Psychiatric disorder       Anxiety, major depression, bipolar    Spinal stenosis      Syncope 2014     orthostatic hypotension            Surgical History         Past Surgical History:   Procedure Laterality Date    APPENDECTOMY        GASTRIC RESTRICTION SURGERY         Gastric Sleeve Nov 2015    HYSTERECTOMY        JOINT REPLACEMENT         Left Knee 2011 and Right Hip 2010            Meds/Allergies:             Prior to Admission medications    Medication Sig Start Date End Date Taking?  Authorizing Provider   acetaminophen (TYLENOL) 500 mg tablet Take 1 tablet (500 mg total) by mouth every 6 (six) hours as needed (pain) 7/30/19   Yes Ramírez Sanchez PA-C   apixaban (ELIQUIS) 5 mg Take 5 mg by mouth 2 (two) times a day     Yes Historical Provider, MD   ARIPiprazole (ABILIFY) 15 mg tablet Take 15 mg by mouth daily     Yes Historical Provider, MD   cholecalciferol (VITAMIN D3) 1,000 units tablet Take 5,000 Units by mouth daily     Yes Historical Provider, MD   Erenumab-aooe (AIMOVIG) 140 MG/ML SOAJ Inject 140 mg under the skin every 30 (thirty) days  3/22/19   Yes Historical Provider, MD   eszopiclone (LUNESTA) 3 MG tablet Take 3 mg by mouth daily     Yes Historical Provider, MD   ferrous sulfate 325 (65 Fe) mg tablet Take 325 mg by mouth daily with breakfast     Yes Historical Provider, MD   fluvoxaMINE (LUVOX) 100 mg tablet Take 300 mg by mouth daily at bedtime       Yes Historical Provider, MD   gabapentin (NEURONTIN) 600 MG tablet Take 600 mg by mouth 3 (three) times a day      Yes Historical Provider, MD   haloperidol (HALDOL) 5 mg tablet Take one half tab orally twice a day as needed for headache or nausea  Max 2 tabs/day, 3 days/week 5/21/19   Yes Historical Provider, MD   lamoTRIgine (LaMICtal) 200 MG tablet Take 200 mg by mouth daily      Yes Historical Provider, MD   LORazepam (ATIVAN) 1 mg tablet Take 2 mg by mouth 3 (three) times a day as needed      Yes Historical Provider, MD   meclizine (ANTIVERT) 12 5 MG tablet Take 1 tablet (12 5 mg total) by mouth every 8 (eight) hours as needed for dizziness 10/7/19   Yes Reynold Gregg DO   mexiletine (MEXITIL) 150 mg capsule Take 150 mg by mouth 3 (three) times a day     Yes Historical Provider, MD   ondansetron (ZOFRAN) 4 mg tablet Take 1 tablet (4 mg total) by mouth every 8 (eight) hours as needed for nausea or vomiting 10/7/19   Yes Reynold Gregg DO   zonisamide (ZONEGRAN) 100 mg capsule Take 200 mg by mouth daily     Yes Historical Provider, MD      I have reviewed home medications with a medical source (PCP, Pharmacy, other)      Allergies:          Allergies   Allergen Reactions    Ketorolac Itching and Other (See Comments)       [toradol] Itching, hives    Mushroom Extract Complex           Social History:     Marital Status: /Civil Union   Occupation:  Unknown  Patient Pre-hospital Living Situation:  With      Substance Use History: Social History           Substance and Sexual Activity   Alcohol Use Not Currently    Frequency: Never    Binge frequency: Never      Social History           Tobacco Use   Smoking Status Former Smoker    Packs/day: 0 20    Types: Cigarettes    Last attempt to quit:     Years since quittin 7   Smokeless Tobacco Never Used      Social History          Substance and Sexual Activity   Drug Use Never         Family History:     non-contributory     Physical Exam:      Vitals:   Blood Pressure: 140/70 (10/15/19 1819)  Pulse: 77 (10/15/19 1819)  Temperature: 98 2 °F (36 8 °C) (10/15/19 1447)  Respirations: 18 (10/15/19 1819)  SpO2: 97 % (10/15/19 1819)     General: well appearing, no acute distress  HEENT: atraumatic, PERRLA, moist mucosa, normal pharynx, normal tonsils and adenoids, normal tongue, no fluid in sinuses  Neck: Trachea midline, no carotid bruit, no masses  Respiratory: normal chest wall expansion, CTA B, no r/r/w, no rubs  Cardiovascular: RRR, no m/r/g, Normal S1 and S2  Abdomen: Soft, non-tender, non-distended, normal bowel sounds in all quadrants, no hepatosplenomegaly, no tympany  Rectal: deferred  Musculoskeletal: normal ROM in upper and lower extremities  Integumentary: warm, dry, and pink, with no rash, purpura, or petechia  Heme/Lymph: no lymphadenopathy, no bruises  Neurological: Cranial Nerves II-XII grossly intact, no tics, normal sensation to pressure and light touch  Psychiatric: cooperative with normal mood, affect, and cognition        Additional Data:      Lab Results: I have personally reviewed pertinent reports             Results from last 7 days   Lab Units 10/15/19  1800   WBC Thousand/uL 10 21*   HEMOGLOBIN g/dL 12 2   HEMATOCRIT % 37 4   PLATELETS Thousands/uL 309   NEUTROS PCT % 74   LYMPHS PCT % 16   MONOS PCT % 10   EOS PCT % 0           Results from last 7 days   Lab Units 10/15/19  1800   SODIUM mmol/L 146*   POTASSIUM mmol/L 3 8   CHLORIDE mmol/L 110*   CO2 mmol/L 29   BUN mg/dL 10   CREATININE mg/dL 0 73   ANION GAP mmol/L 7   CALCIUM mg/dL 9 1   GLUCOSE RANDOM mg/dL 98                         Imaging: I have personally reviewed pertinent reports        XR chest 2 views   ED Interpretation by Chuck Mac DO (10/15 1624)   No acute cardiopulmonary pathology  Unchanged from previous chest x-ray       Final Result by Teetee Heart MD (10/15 1630)       No acute cardiopulmonary disease                Workstation performed: [id number]           CT head without contrast   Final Result by Malorie Sanches MD (10/15 1615)       No acute intracranial abnormality                        Workstation performed: [id number]           CT cervical spine without contrast   Final Result by Malorie Sanches MD (10/15 1619)       No cervical spine fracture or traumatic malalignment                        Workstation performed: [id number]           MRI inpatient order    (Results Pending)         EKG, Pathology, and Other Studies Reviewed on Admission:   · EKG: Pending     Allscripts / Epic Records Reviewed: Yes      ** Please Note: This note has been constructed using a voice recognition system   **

## 2019-10-16 NOTE — CASE MANAGEMENT
Admitted for: vertigo LOS-1D; Patient is a 30 day readmission  CM met with patient to assess and address discharge needs  Primary caregiver-self  Support System- and daughters  Advance Directive-no-discussed-patient states she and her  are in process of working with a  to create POA/LW and states that her  knows her wishes   Power of - patient doesn't have POA but  is healthcare representative   [facility]  PCP-  [phone]  Mental Health/Drug/ETOH- Major depression and anxiety; sees psychiatrist and therapist every 3 months at [facility] but interested in changing to [address]  Denies hospitalizations, ECT, partial program, mental health  or any suicide attempts  Denies history of D&A abuse     Prior Living Arrangements/ADL's/Mobility/Home Set-Up- Pt lives in [facility] with her  in [facility] being independent with all aspects of care, ambulating without an AD unless when feeling weak on occasion admits to using a SPC or RW which she has at home from previous surgeries  Patient is no longer driving due to vision; states is scheduled for Right eye cataract surgery on 10/27 or 10/28  Prior Services for HHC/DME/Infusion/Private-Patient had previous stay at [facility] and is currently receiving [facility] for SN/PT  Patient for discharge to [facility] when medically cleared  Patient was reluctant to got to [facility] due to upcoming eye surgery that she said could not be rescheduled  Patient was agreeable to making choices for facilities that were not nursing homes 1) [facility], 2) [facility]  CM placed referral for [facility] SN/PT as well in case patient returned home instead  Patient's  can drive patient at discharge  CM department will continue to follow through discharge       CM reviewed d/c planning process including the following: identifying help at home, patient preference for d/c planning needs, availability of treatment team to discuss questions or concerns patient and/or family may have regarding understanding medications and recognizing signs and symptoms once discharged  CM also encouraged patient to follow up with all recommended appointments after discharge  Patient advised of importance for patient and family to participate in managing patient's medical well being

## 2019-10-16 NOTE — H&P
Primary Care Provider: Jeaneth Aguero DO   Date and time admitted to hospital: 10/15/2019  2:43 PM                                                                HISTORY AND PHYSICAL     Closed head injury with brief loss of consciousness [facility])  Assessment & Plan  Patient with fall with unknown time loss of consciousness  Neurologic checks  High risk encounter given she in on Eliquis  Initially CT by the ED read as negative for bleed      REPEAT HEAD CT FOR ANY NEW Neurologic changes      Ambulatory dysfunction  Assessment & Plan  Patient reports weakness - ongoing     PT/OT/CM consultation placed     PE (pulmonary thromboembolism) ([address] )  Assessment & Plan  On Eliquis for history of PE/DVT      Does not appear to have any notable head injury     Resume current dosing for now     History of TIAs  Assessment & Plan  MRI of the Brain ordered to rule out cerbellar etiology     Neurologic changes      Migraine  Assessment & Plan  Continue current regimen  No active migraine      Previous admission beginning of October, followed by [facility]     10/02- 10/07 - Previous admission   She was given three day course of valproic acid and magnesium     Osteoarthritis of lumbosacral spine without myelopathy  Assessment & Plan  Known OA  PT/OT evals        Fibromyalgia  Assessment & Plan  Recommend gated exercise program as an outpatient     Bipolar depression [facility])  Assessment & Plan  Psychiatric consultation placed      Suspect underlying psychiatric component         Assessment:     This is a 59-year-old female patient with a significant psychiatric history who now presents with dizziness and intractable vertigo  Her labs are otherwise unremarkable and normal imaging additionally with no acute abnormality  I suspect some component of psychiatric disorder but given her other risk factors would be reasonable to rule out potential myocardial infarction as well as acute neurologic change    Physical therapy and occupational therapy consultations have been placed  It is questionable if the syncopal episode did occur, given the fact that she is on Eliquis PE is highly unlikely  Additionally she endorses no chest pain  Will order serial set of cardiac enzymes, EKG to be obtained  Would recommend repeat head CT for any new neurologic changes               VTE Prophylaxis: Apixaban (Eliquis)  / sequential compression device   Code Status:  Full  POLST: There is no POLST form on file for this patient (pre-hospital)  Discussion with family:  Patient     Anticipated Length of Stay:  Patient will be admitted on an Observation basis with an anticipated length of stay of  less than 2 midnights  Justification for Hospital Stay:  Intractable vertigo     Total Time for Visit, including Counseling / Coordination of Care: 30 minutes  Greater than 50% of this total time spent on direct patient counseling and coordination of care      Chief Complaint:   Vertigo and fall with questionable loss of consciousness     History of Present Illness:     Maryann Lance is a 59 y o  female who presents with past medical history of previous DVT and pulmonary embolism maintained on Eliquis  She presents emergency room after having a fall due to vertigo  Details of her history are unclear, she reports confusion for approximately 1 week  She was recently admitted at the beginning of the month for migraine headache High requiring inpatient admission  She reports she feels weak all over, no significant difficulty with swallowing, no chest pain no tongue biting  She has had no recent medication changes  She is typically followed in the outpatient setting by [facility] and has previously received Botox injections in the past   She denies any abdominal pain nausea vomiting  In the emergency room a CT of the head was performed which was reported by the emergency room physician to show no acute bleed    The patient has no visible trauma of her head, nor does she endorse any focal areas of bleeding  She states good medication compliance  She reports that her dizziness is not improved despite meclizine  She states that this dizziness is unrelated to her typical migraine headaches  She denies any other travel or tripped history  She does report a previous history of TIA, however the symptoms are quite different      Review of Systems:     A complete and comprehensive 14 point organ system review has been performed and all other systems are negative other than stated above  Past Medical and Surgical History:      Medical History        Past Medical History:   Diagnosis Date    Adrenal insufficiency ([facility]'s disease) ([facility] [address]      Bipolar disorder      Cervical radiculopathy      Chronic back pain      DVT, lower extremity ([facility] [address] ) 1991    Fibromyalgia      History of TIAs       cannot remember details    Hypertension      Hypokalemia      Migraine      MRSA (methicillin resistant Staphylococcus aureus) 07/20/2012     nasal swab negative 4/5/19    Psychiatric disorder       Anxiety, major depression, bipolar    Spinal stenosis      Syncope 2014     orthostatic hypotension            Surgical History         Past Surgical History:   Procedure Laterality Date    APPENDECTOMY        GASTRIC RESTRICTION SURGERY         Gastric Sleeve Nov 2015    HYSTERECTOMY        JOINT REPLACEMENT         Left Knee 2011 and Right Hip 2010            Meds/Allergies:             Prior to Admission medications    Medication Sig Start Date End Date Taking?  Authorizing Provider   acetaminophen (TYLENOL) 500 mg tablet Take 1 tablet (500 mg total) by mouth every 6 (six) hours as needed (pain) 7/30/19   Yes Kiet Matos PA-C   apixaban (ELIQUIS) 5 mg Take 5 mg by mouth 2 (two) times a day     Yes Historical Provider, MD   ARIPiprazole (ABILIFY) 15 mg tablet Take 15 mg by mouth daily     Yes Historical Provider, MD   cholecalciferol (VITAMIN D3) 1,000 units tablet Take 5,000 Units by mouth daily     Yes Historical Provider, MD   Erenumab-aooe (AIMOVIG) 140 MG/ML SOAJ Inject 140 mg under the skin every 30 (thirty) days  3/22/19   Yes Historical Provider, MD   eszopiclone (LUNESTA) 3 MG tablet Take 3 mg by mouth daily     Yes Historical Provider, MD   ferrous sulfate 325 (65 Fe) mg tablet Take 325 mg by mouth daily with breakfast     Yes Historical Provider, MD   fluvoxaMINE (LUVOX) 100 mg tablet Take 300 mg by mouth daily at bedtime       Yes Historical Provider, MD   gabapentin (NEURONTIN) 600 MG tablet Take 600 mg by mouth 3 (three) times a day      Yes Historical Provider, MD   haloperidol (HALDOL) 5 mg tablet Take one half tab orally twice a day as needed for headache or nausea  Max 2 tabs/day, 3 days/week 5/21/19   Yes Historical Provider, MD   lamoTRIgine (LaMICtal) 200 MG tablet Take 200 mg by mouth daily      Yes Historical Provider, MD   LORazepam (ATIVAN) 1 mg tablet Take 2 mg by mouth 3 (three) times a day as needed      Yes Historical Provider, MD   meclizine (ANTIVERT) 12 5 MG tablet Take 1 tablet (12 5 mg total) by mouth every 8 (eight) hours as needed for dizziness 10/7/19   Yes Reynold Gregg DO   mexiletine (MEXITIL) 150 mg capsule Take 150 mg by mouth 3 (three) times a day     Yes Historical Provider, MD   ondansetron (ZOFRAN) 4 mg tablet Take 1 tablet (4 mg total) by mouth every 8 (eight) hours as needed for nausea or vomiting 10/7/19   Yes Reynold Gregg DO   zonisamide (ZONEGRAN) 100 mg capsule Take 200 mg by mouth daily     Yes Historical Provider, MD      I have reviewed home medications with a medical source (PCP, Pharmacy, other)      Allergies:          Allergies   Allergen Reactions    Ketorolac Itching and Other (See Comments)       [toradol] Itching, hives    Mushroom Extract Complex           Social History:     Marital Status: /Civil Union   Occupation:  Unknown  Patient Pre-hospital Living Situation:  With      Substance Use History:   Social History           Substance and Sexual Activity   Alcohol Use Not Currently    Frequency: Never    Binge frequency: Never      Social History           Tobacco Use   Smoking Status Former Smoker    Packs/day: 0 20    Types: Cigarettes    Last attempt to quit:     Years since quittin 7   Smokeless Tobacco Never Used      Social History          Substance and Sexual Activity   Drug Use Never         Family History:     non-contributory     Physical Exam:      Vitals:   Blood Pressure: 140/70 (10/15/19 1819)  Pulse: 77 (10/15/19 1819)  Temperature: 98 2 °F (36 8 °C) (10/15/19 1447)  Respirations: 18 (10/15/19 1819)  SpO2: 97 % (10/15/19 1819)     General: well appearing, no acute distress  HEENT: atraumatic, PERRLA, moist mucosa, normal pharynx, normal tonsils and adenoids, normal tongue, no fluid in sinuses  Neck: Trachea midline, no carotid bruit, no masses  Respiratory: normal chest wall expansion, CTA B, no r/r/w, no rubs  Cardiovascular: RRR, no m/r/g, Normal S1 and S2  Abdomen: Soft, non-tender, non-distended, normal bowel sounds in all quadrants, no hepatosplenomegaly, no tympany  Rectal: deferred  Musculoskeletal: normal ROM in upper and lower extremities  Integumentary: warm, dry, and pink, with no rash, purpura, or petechia  Heme/Lymph: no lymphadenopathy, no bruises  Neurological: Cranial Nerves II-XII grossly intact, no tics, normal sensation to pressure and light touch  Psychiatric: cooperative with normal mood, affect, and cognition        Additional Data:      Lab Results: I have personally reviewed pertinent reports             Results from last 7 days   Lab Units 10/15/19  1800   WBC Thousand/uL 10 21*   HEMOGLOBIN g/dL 12 2   HEMATOCRIT % 37 4   PLATELETS Thousands/uL 309   NEUTROS PCT % 74   LYMPHS PCT % 16   MONOS PCT % 10   EOS PCT % 0           Results from last 7 days   Lab Units 10/15/19  1800   SODIUM mmol/L 146*   POTASSIUM mmol/L 3 8   CHLORIDE mmol/L 110* CO2 mmol/L 29   BUN mg/dL 10   CREATININE mg/dL 0 73   ANION GAP mmol/L 7   CALCIUM mg/dL 9 1   GLUCOSE RANDOM mg/dL 98                         Imaging: I have personally reviewed pertinent reports        XR chest 2 views   ED Interpretation by Alex Rubio DO (10/15 1624)   No acute cardiopulmonary pathology  Unchanged from previous chest x-ray       Final Result by Fabiola Freitas MD (10/15 1630)       No acute cardiopulmonary disease                Workstation performed: [id number]           CT head without contrast   Final Result by Huma Jade MD (10/15 1615)       No acute intracranial abnormality                        Workstation performed: [id number]           CT cervical spine without contrast   Final Result by Huma Jade MD (10/15 1619)       No cervical spine fracture or traumatic malalignment                        Workstation performed: [id number]           MRI inpatient order    (Results Pending)         EKG, Pathology, and Other Studies Reviewed on Admission:   · EKG: Pending     Allscripts / Epic Records Reviewed: Yes      ** Please Note: This note has been constructed using a voice recognition system   **

## 2019-10-16 NOTE — ASSESSMENT & PLAN NOTE
Psychiatric consultation placed      Suspect underlying psychiatric component     Resumed on home medications, notably the patient's Lamictal level was subtherapeutic during last admission

## 2019-10-16 NOTE — PHYSICAL THERAPY NOTE
PT EVALUATION    Pt  Name: Ariana Anton  Pt  Age: 59 y o  MRN: [id number]  LENGTH OF STAY: 1    Patient Active Problem List   Diagnosis    Bipolar depression ([place] [address] )    Adrenal insufficiency ([place]'s disease) ([place] [address] )    Vertigo    Fibromyalgia    Spinal stenosis of lumbar region    Osteoarthritis of lumbosacral spine without myelopathy    Migraine    HLD (hyperlipidemia)    Syncope    Orthostatic hypotension    History of TIAs    Intractable migraine    Neck pain    Low back pain    PE (pulmonary thromboembolism) ([place] [address] )    Hypertension    Bipolar disorder    Chronic back pain    DVT, lower extremity (HCC)    Anxiety    Leukocytosis    Chronic anticoagulation    Anemia    Ambulatory dysfunction    Edema    Right arm pain    Dizziness    Intractable migraine without aura and without status migrainosus    Closed head injury with brief loss of consciousness ([place] [address] )       Admitting Diagnoses:   Neck pain [M54 2]    Past Medical History:   Diagnosis Date    Adrenal insufficiency (Clanton's disease) ([place] [address] )     Bipolar disorder     Cervical radiculopathy     Chronic back pain     DVT, lower extremity ([place] [address] ) 1991    Fibromyalgia     History of TIAs     cannot remember details    Hypertension     Hypokalemia     Migraine     MRSA (methicillin resistant Staphylococcus aureus) 07/20/2012    nasal swab negative 4/5/19    Psychiatric disorder     Anxiety, major depression, bipolar    Spinal stenosis     Syncope 2014    orthostatic hypotension       Past Surgical History:   Procedure Laterality Date    APPENDECTOMY      GASTRIC RESTRICTION SURGERY      Gastric Sleeve Nov 2015    HYSTERECTOMY      JOINT REPLACEMENT      Left Knee 2011 and Right Hip 2010       Imaging Studies:  XR chest 2 views   ED Interpretation by Maday Hodgson DO (10/15 2005)   No acute cardiopulmonary pathology    Unchanged from previous chest x-ray      Final Result by Amarilys Jarrett MD (10/15 1630)      No acute cardiopulmonary disease  Workstation performed: [id number]         CT head without contrast   Final Result by Alex Ellis MD (10/15 1615)      No acute intracranial abnormality  Workstation performed: [id number]         CT cervical spine without contrast   Final Result by Alex Ellis MD (10/15 1619)      No cervical spine fracture or traumatic malalignment  Workstation performed: [id number]         XR shoulder 2+ vw right    (Results Pending)   MRI brain IAC wo contrast    (Results Pending)        10/16/19 1227   Pain Assessment   Pain Assessment 0-10   Pain Score Worst Possible Pain   Pain Type Chronic pain;Acute pain   Pain Location Leg   Pain Orientation Right   [facility] Pain Intervention(s) Medication (See MAR); Repositioned; Ambulation/increased activity; Emotional support; Rest   Home Living   Type of [address] [facility] Two level; Able to live on main level with bedroom/bathroom;Stairs to enter with rails  (3STE)   Home Equipment Walker;Cane   Prior Function   Level of [place] Independent with ADLs and functional mobility  (w/ RW)   Lives With [address] in the last 6 months 1 to 4  (3x)   Vocational On disability   Comments Pt's  works as a    Restrictions/Precautions   Weight Bearing Precautions Per Order No   Other Precautions Multiple lines; Fall Risk;Pain; Chair Alarm; Bed Alarm   General   Family/Caregiver Present Yes  ()   Cognition   Arousal/Participation Alert   Orientation Level Oriented to person;Oriented to place;Oriented to time   Following Commands Follows one step commands without difficulty   RUE Assessment   RUE Assessment   (refer to OT)   LUE Assessment   LUE Assessment   (refer to OT)   RLE Assessment   RLE Assessment X   Strength RLE   RLE Overall Strength 3+/5   R Hip Flexion 3-/5   LLE Assessment   LLE Assessment X   Strength LLE LLE Overall Strength 4-/5   L Hip Flexion 3-/5   Coordination   Movements are Fluid and Coordinated 1   Sensation X  (chronic numbness to B/L feet)   Bed Mobility   Supine to Sit 5  Supervision   Additional items HOB elevated; Bedrails; Increased time required;Verbal cues   Additional Comments cues for techniques   Transfers   Sit to Stand 4  Minimal assistance   Additional items Assist x 2;Bedrails; Increased time required;Verbal cues   Stand to Sit 4  Minimal assistance   Additional items Assist x 2;Armrests; Increased time required;Verbal cues   Additional Comments cues for techniques   Ambulation/Elevation   Gait pattern Decreased foot clearance;Shuffling; Short stride; Excessively slow   Gait Assistance 4  Minimal assist   Additional items Assist x 2;Verbal cues; Tactile cues   Assistive Device Rolling walker   Distance 3'x1   Balance   Static Sitting Fair +   Static Standing Poor +  (w/ RW)   Ambulatory Poor  (w/ RW)   Endurance Deficit   Endurance Deficit Yes   Endurance Deficit Description weakness; deconditioning   Activity Tolerance   Activity Tolerance Patient limited by fatigue   Medical Staff [facility]   Nurse Made Aware RN Chelsey   Assessment   Prognosis Good   Problem List Decreased strength;Decreased endurance;Decreased range of motion; Impaired balance;Decreased mobility; Impaired judgement;Decreased safety awareness; Obesity;Pain   Assessment  Pt  59 y  o female presented after a fall due to dizziness  Pt reports she was down on the floor x3hrs  Pt also c/o neck pain  CT showed no cervical spine fx or traumatic malalignment & no acute intracranial abnormality  Pt admitted for possible syncope w/ Neck pain M54 2  Per chart, suspect some psych component  Of note, pt was recently admitted here in [facility] from 10/2/19-10/7/19 for migraine & dizziness  Pt referred to PT for mobility assessment & D/C planning w/ orders of activity as tolerated   PTA, pt reports that she was fairly  I w/ RW upon D/C from hospital on 10/7/19 but noted slow decline in function since then  On eval, pt demonstrate dec mobility, balance, endurance & amb  Pt require minAx2 for most functional mobility + cues for techniques  Gait deviations as above, slow & unsteady w/ shuffling gait but no gross LOB noted  No dizziness reported t/o session  Nsg staff most recent vital signs as follows: /67 (BP Location: Right arm)   Pulse 83   Temp 98 8 °F (37 1 °C) (Temporal)   Resp 18   Ht 5' 8" (1 727 m)   Wt 116 kg (255 lb 11 7 oz)   SpO2 94%   BMI 38 88 kg/m²   At end of session, pt tolerated OOB in chair w/o issues, call bell & phone in reach, chair alarm activated  Fall precautions reinforced w/ good understanding  Pt functioning below baseline hence will continue skilled PT to improve function & safety  At this time, due to above mentioned deficits, dec caregiver support & high risk for falls, pt will benefit from inpt rehab at D/C  Pt agreeable to D/C rec  CM to follow  Ns staff to continue to mobilized pt (OOB in chair for all meals & ambulate in room/unit) as tolerated to prevent further decline in function  Nsg notified  Barriers to Discharge Inaccessible home environment;Decreased caregiver support   Goals   Patient Goals to get better   STG Expiration Date 10/30/19   Short Term Goal #1 Goals to be met in 14 days; pt will be able to: 1) inc strength & balance by 1/2 grade to improve overall functional mobility & dec fall risk; 2) inc bed mobility to I for pt to be able to get in/OOB safely w/ proper techniques 100% of the time, to dec caregiver assistance & safely function at home; 3) inc transfers to modified I for pt to transition safely from one surface to another w/o % of the time, to dec caregiver assistance & safely function at home; 4) inc amb w/ RW approx   >80' w/ S for pt to ambulate household distances w/o any % of the time, to dec caregiver assistance & safely function at home; 5) inc barthel score to 70 to decrease overall risk for falls; 6) negotiate stairs w/ S for inc safety during stair mgt inside/outside of home & dec caregiver assistance; 7) pt/caregiver ed   PT Treatment Day 0   Plan   Treatment/Interventions LE strengthening/ROM; Functional transfer training;Elevations; Therapeutic exercise; Endurance training;Patient/family training;Bed mobility;Gait training;Spoke to nursing;OT;Family   PT Frequency Other (Comment)  (3-5x/wk)   Recommendation   Recommendation Short-term skilled PT   Equipment Recommended Walker  (RW)   PT - OK to Discharge Yes  (to inpt rehab when medically cleared)   Barthel Index   Feeding 10   Bathing 0   Grooming Score 5   Dressing Score 5   Bladder Score 10   Bowels Score 10   Toilet Use Score 5   Transfers (Bed/Chair) Score 5   Mobility (Level Surface) Score 0   Stairs Score 0   Barthel Index Score 50   Hx/personal factors: co-morbidities, inaccessible home, dec caregiver support, telemetry, use of AD, pain, h/o of falls, fall risk, assist w/ ADL's and obesity  Examination: dec mobility, dec balance, dec endurance, dec amb, moderate fall risk, pain, dec cognition  Clinical: unpredictable (ongoing medical status, abnormal lab values, moderate fall risk and pain mgt)  Complexity: high     Levada Lack, PT

## 2019-10-16 NOTE — ASSESSMENT & PLAN NOTE
Unclear etiology of patient's vertigo  MRI of the brain has been ordered for further characterization  Images personally reviewed by be with no evidence of any acute infarct  She is pending Neurology consultation at this time  S previously seen [facility] in the past for migraine headaches which she reports are different      Suspect psychiatric component to the patient's vertigo

## 2019-10-16 NOTE — PROGRESS NOTES
Progress Note - Prabha Fuchs 1960, 59 y o  female MRN: [id number]    Unit/Bed#: E5 -01 Encounter: [id number]    Primary Care Provider: Biju Moreland DO   Date and time admitted to hospital: 10/15/2019  2:43 PM        Morbid obesity with BMI of 40 0-44 9, adult [facility])  Assessment & Plan  Continue healthy eating living and lifestyle modification    Shoulder pain  Assessment & Plan  X-ray ordered - await final read, no obvious dislocation or fracture  - Add Lidocaine patch    Closed head injury with brief loss of consciousness ([facility] [address] )  Assessment & Plan  Patient with fall with unknown time loss of consciousness  Neurologic checks  High risk encounter given she in on Eliquis    Head CT negative for any bleed    REPEAT HEAD CT FOR ANY NEW Neurologic changes  Ambulatory dysfunction  Assessment & Plan  Patient reports weakness - ongoing    PT/OT/CM consultation placed      No evidence of an acute pathology for the patient's ambulatory dysfunction  PE (pulmonary thromboembolism) ([facility] [address] )  Assessment & Plan  On Eliquis for history of PE/DVT     Does not appear to have any notable head injury    Resume current dosing for now    History of TIAs  Assessment & Plan  MRI of the Brain ordered to rule out cerbellar etiology    Neurologic changes are not noted    Migraine  Assessment & Plan  Continue current regimen  No active migraine  Previous admission beginning of October, followed by [facility]    10/02- 10/07 - Previous admission   She was given three day course of valproic acid and magnesium    Osteoarthritis of lumbosacral spine without myelopathy  Assessment & Plan  Known OA  PT/OT evals      Fibromyalgia  Assessment & Plan  Recommend gated exercise program as an outpatient    Physical therapy and occupational therapy consultation reviewed with recommendation for short-term nursing facility placement    Bipolar depression [facility])  Assessment & Plan  Psychiatric consultation placed      Suspect underlying psychiatric component     Resumed on home medications, notably the patient's Lamictal level was subtherapeutic during last admission    * Vertigo  Assessment & Plan  Unclear etiology of patient's vertigo  MRI of the brain has been ordered for further characterization  Images personally reviewed by be with no evidence of any acute infarct  She is pending Neurology consultation at this time  S previously seen [facility] in the past for migraine headaches which she reports are different  Suspect psychiatric component to the patient's vertigo    VTE Pharmacologic Prophylaxis:   Pharmacologic: Apixaban (Eliquis)  Mechanical VTE Prophylaxis in Place: Yes    Patient Centered Rounds: I have performed bedside rounds with nursing staff today  Discussions with Specialists or Other Care Team Provider:  None    Education and Discussions with Family / Patient:  Patient    Time Spent for Care: 20 minutes  More than 50% of total time spent on counseling and coordination of care as described above  Current Length of Stay: 1 day(s)    Current Patient Status: Observation   Certification Statement: The patient will continue to require additional inpatient hospital stay due to MRI of the brain as well as right shoulder x-ray and likely placement    Discharge Plan:  Short-term nursing facility    Code Status: Level 1 - Full Code      Subjective:   Patient seen and examined, denies any chest pain shortness of breath or difficulty breathing  She does report that she feels as if she is still spinning  No ringing in the ears, no nasal congestion or pain  Does report having right shoulder pain, unclear if this is related to fall at home    Reports weakness in all of her extremities with no focality    Objective:     Vitals:   Temp (24hrs), Av 3 °F (36 8 °C), Min:97 7 °F (36 5 °C), Max:98 8 °F (37 1 °C)    Temp:  [97 7 °F (36 5 °C)-98 8 °F (37 1 °C)] 98 8 °F (37 1 °C)  HR:  [77-88] 83  Resp:  [18] 18  BP: (130-167)/(59-84) 147/67  SpO2:  [94 %-99 %] 94 %  Body mass index is 38 88 kg/m²  Input and Output Summary (last 24 hours):     No intake or output data in the 24 hours ending 10/16/19 1533    Physical Exam:     General: well appearing, no acute distress  HEENT: atraumatic, PERRLA, moist mucosa, normal pharynx, normal tonsils and adenoids, normal tongue, no fluid in sinuses  Neck: Trachea midline, no carotid bruit, no masses  Respiratory: normal chest wall expansion, CTA B, no r/r/w, no rubs  Cardiovascular: RRR, no m/r/g, Normal S1 and S2  Abdomen: Soft, non-tender, non-distended, normal bowel sounds in all quadrants, no hepatosplenomegaly, no tympany  Rectal: deferred  Musculoskeletal: normal ROM in upper and lower extremities  Integumentary: warm, dry, and pink, with no rash, purpura, or petechia  Heme/Lymph: no lymphadenopathy, no bruises  Neurological: Cranial Nerves II-XII grossly intact, no tics, normal sensation to pressure and light touch  Psychiatric: cooperative with normal mood, affect, and cognition      Additional Data:     Labs:    Results from last 7 days   Lab Units 10/16/19  0448 10/15/19  1800   WBC Thousand/uL 7 04 10 21*   HEMOGLOBIN g/dL 11 6 12 2   HEMATOCRIT % 37 3 37 4   PLATELETS Thousands/uL 308 309   NEUTROS PCT %  --  74   LYMPHS PCT %  --  16   MONOS PCT %  --  10   EOS PCT %  --  0     Results from last 7 days   Lab Units 10/16/19  0448   SODIUM mmol/L 143   POTASSIUM mmol/L 4 2   CHLORIDE mmol/L 112*   CO2 mmol/L 24   BUN mg/dL 11   CREATININE mg/dL 0 65   ANION GAP mmol/L 7   CALCIUM mg/dL 8 6   GLUCOSE RANDOM mg/dL 91                           * I Have Reviewed All Lab Data Listed Above  * Additional Pertinent Lab Tests Reviewed:  [facility]    Imaging:    Imaging Reports Reviewed Today Include:  MRI of the brain right shoulder x-ray  Imaging Personally Reviewed by Myself Includes:  MRI of the brain right shoulder x-ray    Recent Cultures (last 7 days):           Last 24 Hours Medication List:     Current Facility-Administered Medications:  acetaminophen 488 mg Oral Q6H PRN Hernando Lugo, DO   apixaban 5 mg Oral BID Thanh Davidson, DO   ARIPiprazole 15 mg Oral Daily Thanh Cast[employer], DO   cholecalciferol 5,000 Units Oral Daily Thanh Davidson, DO   docusate sodium 100 mg Oral BID Thanh Davidson, DO   ferrous sulfate 325 mg Oral Daily With Breakfast Thanh Davidson, DO   fluvoxaMINE 300 mg Oral HS Thanh Davidson, DO   gabapentin 600 mg Oral TID Thanh Davidson, DO   lamoTRIgine 200 mg Oral Daily Thahn Davidson, DO   lidocaine 1 patch Topical Daily Thanh Davidson, DO   LORazepam 2 mg Oral TID PRN Hernando Lugo, DO   meclizine 12 5 mg Oral Q8H PRN Thanh Davidson, DO   ondansetron 4 mg Intravenous Q4H PRN Thanh Davidson, DO   traMADol 50 mg Oral Q6H PRN Edwige Grissom PA-C   zonisamide 200 mg Oral Daily Hernando Lugo DO        Today, Patient Was Seen By: Michael Lanza DO    ** Please Note: Dictation voice to text software may have been used in the creation of this document   **

## 2019-10-16 NOTE — PLAN OF CARE
Problem: Potential for Falls  Goal: Patient will remain free of falls  Description  INTERVENTIONS:  - Assess patient frequently for physical needs  -  Identify cognitive and physical deficits and behaviors that affect risk of falls    -  [place] fall precautions as indicated by assessment   - Educate patient/family on patient safety including physical limitations  - Instruct patient to call for assistance with activity based on assessment  - Modify environment to reduce risk of injury  - Consider OT/PT consult to assist with strengthening/mobility  Outcome: Progressing     Problem: PAIN - ADULT  Goal: Verbalizes/displays adequate comfort level or baseline comfort level  Description  Interventions:  - Encourage patient to monitor pain and request assistance  - Assess pain using appropriate pain scale  - Administer analgesics based on type and severity of pain and evaluate response  - Implement non-pharmacological measures as appropriate and evaluate response  - Consider cultural and social influences on pain and pain management  - Notify physician/advanced practitioner if interventions unsuccessful or patient reports new pain  Outcome: Progressing     Problem: INFECTION - ADULT  Goal: Absence or prevention of progression during hospitalization  Description  INTERVENTIONS:  - Assess and monitor for signs and symptoms of infection  - Monitor lab/diagnostic results  - Monitor all insertion sites, i e  indwelling lines, tubes, and drains  - Monitor endotracheal if appropriate and nasal secretions for changes in amount and color  - [place] appropriate cooling/warming therapies per order  - Administer medications as ordered  - Instruct and encourage patient and family to use good hand hygiene technique  - Identify and instruct in appropriate isolation precautions for identified infection/condition  Outcome: Progressing  Goal: Absence of fever/infection during neutropenic period  Description  INTERVENTIONS:  - Monitor WBC    Outcome: Progressing     Problem: SAFETY ADULT  Goal: Patient will remain free of falls  Description  INTERVENTIONS:  - Assess patient frequently for physical needs  -  Identify cognitive and physical deficits and behaviors that affect risk of falls    -  [place] fall precautions as indicated by assessment   - Educate patient/family on patient safety including physical limitations  - Instruct patient to call for assistance with activity based on assessment  - Modify environment to reduce risk of injury  - Consider OT/PT consult to assist with strengthening/mobility  Outcome: Progressing  Goal: Maintain or return to baseline ADL function  Description  INTERVENTIONS:  -  Assess patient's ability to carry out ADLs; assess patient's baseline for ADL function and identify physical deficits which impact ability to perform ADLs (bathing, care of mouth/teeth, toileting, grooming, dressing, etc )  - Assess/evaluate cause of self-care deficits   - Assess range of motion  - Assess patient's mobility; develop plan if impaired  - Assess patient's need for assistive devices and provide as appropriate  - Encourage maximum independence but intervene and supervise when necessary  - Involve family in performance of ADLs  - Assess for home care needs following discharge   - Consider OT consult to assist with ADL evaluation and planning for discharge  - Provide patient education as appropriate  Outcome: Progressing  Goal: Maintain or return mobility status to optimal level  Description  INTERVENTIONS:  - Assess patient's baseline mobility status (ambulation, transfers, stairs, etc )    - Identify cognitive and physical deficits and behaviors that affect mobility  - Identify mobility aids required to assist with transfers and/or ambulation (gait belt, sit-to-stand, lift, walker, cane, etc )  - [place] fall precautions as indicated by assessment  - Record patient progress and toleration of activity level on Mobility SBAR; progress patient to next Phase/Stage  - Instruct patient to call for assistance with activity based on assessment  - Consider rehabilitation consult to assist with strengthening/weightbearing, etc   Outcome: Progressing     Problem: DISCHARGE PLANNING  Goal: Discharge to home or other facility with appropriate resources  Description  INTERVENTIONS:  - Identify barriers to discharge w/patient and caregiver  - Arrange for needed discharge resources and transportation as appropriate  - Identify discharge learning needs (meds, wound care, etc )  - Arrange for interpretive services to assist at discharge as needed  - Refer to Case Management Department for coordinating discharge planning if the patient needs post-hospital services based on physician/advanced practitioner order or complex needs related to functional status, cognitive ability, or social support system  Outcome: Progressing     Problem: Knowledge Deficit  Goal: Patient/family/caregiver demonstrates understanding of disease process, treatment plan, medications, and discharge instructions  Description  Complete learning assessment and assess knowledge base    Interventions:  - Provide teaching at level of understanding  - Provide teaching via preferred learning methods  Outcome: Progressing

## 2019-10-16 NOTE — UTILIZATION REVIEW
Initial Clinical Review    Admission: Date/Time/Statement:    OBS  ORDER    10/15  @    6811  Orders Placed This Encounter   Procedures    Place in Observation (expected length of stay for this patient is less than two midnights)     Standing Status:   Standing     Number of Occurrences:   1     Order Specific Question:   Admitting Physician     Answer:   Libia Flores     Order Specific Question:   Level of Care     Answer:   Med Surg [16]     ED Arrival Information     Expected Arrival Acuity Means of Arrival Escorted By Service Admission Type    - 10/15/2019 14:43 Urgent Ambulance [facility] ([address]) General Medicine Urgent    Arrival Complaint    neck pain/ fall        Chief Complaint   Patient presents with    Fall     Patient presents via EMS, fell onto carpet just prior to arrival  [place] of what caused fall; unsure of LOC  Is on Eliquis  Reports total body pain  Assessment/Plan: Closed head injury with brief loss of consciousness [facility])  Assessment & Plan  Patient with fall with unknown time loss of consciousness  Neurologic checks  High risk encounter given she in on Eliquis  Initially CT by the ED read as negative for bleed      REPEAT HEAD CT FOR ANY NEW Neurologic changes      Ambulatory dysfunction  Assessment & Plan  Patient reports weakness - ongoing     PT/OT/CM consultation placed     PE (pulmonary thromboembolism) ([facility] [address] )  Assessment & Plan  On Eliquis for history of PE/DVT      Does not appear to have any notable head injury     Resume current dosing for now     History of TIAs  Assessment & Plan  MRI of the Brain ordered to rule out cerbellar etiology     Neurologic changes    Migraine  Assessment & Plan  Continue current regimen  No active migraine      Previous admission beginning of October, followed by [facility]     10/02- 10/07 - Previous admission   She was given three day course of valproic acid and magnesium     Osteoarthritis of lumbosacral spine without myelopathy  Assessment & Plan  Known OA  PT/OT evals        Fibromyalgia  Assessment & Plan  Recommend gated exercise program as an outpatient     Bipolar depression [facility])  Assessment & Plan  Psychiatric consultation placed      Suspect underlying psychiatric component   This is a 76-year-old female patient with a significant psychiatric history who now presents with dizziness and intractable vertigo  Rene Pattee labs are otherwise unremarkable and normal imaging additionally with no acute abnormality   I suspect some component of psychiatric disorder but given her other risk factors would be reasonable to rule out potential myocardial infarction as well as acute neurologic change   Physical therapy and occupational therapy consultations have been placed  Qian Foy is questionable if the syncopal episode did occur, given the fact that she is on Eliquis PE is highly unlikely   Additionally she endorses no chest pain   Will order serial set of cardiac enzymes, EKG to be obtained   Would recommend repeat head CT for any new neurologic changes      Simi Browning a 59 y o  female who presents with past medical history of previous DVT and pulmonary embolism maintained on Eliquis   She presents emergency room after having a fall due to vertigo   Details of her history are unclear, she reports confusion for approximately 1 week  Fidenico Montoya was recently admitted at the beginning of the month for migraine headache High requiring inpatient admission   She reports she feels weak all over, no significant difficulty with swallowing, no chest pain no tongue biting   She has had no recent medication changes   She is typically followed in the outpatient setting by [facility] and has previously received Botox injections in the past  Fidencio Montoya denies any abdominal pain nausea vomiting   In the emergency room a CT of the head was performed which was reported by the emergency room physician to show no acute bleed   The patient has no visible trauma of her head, nor does she endorse any focal areas of bleeding   She states good medication compliance   She reports that her dizziness is not improved despite meclizine   She states that this dizziness is unrelated to her typical migraine headaches   She denies any other travel or tripped history   She does report a previous history of TIA, however the symptoms are quite different      ED Triage Vitals   Temperature Pulse Respirations Blood Pressure SpO2   10/15/19 1447 10/15/19 1447 10/15/19 1447 10/15/19 1447 10/15/19 1447   98 2 °F (36 8 °C) 81 18 (!) 185/75 98 %      Temp Source Heart Rate Source Patient Position - Orthostatic VS BP Location FiO2 (%)   10/16/19 0800 10/15/19 1447 10/15/19 1447 10/15/19 1447 --   Temporal Monitor Lying Right arm       Pain Score       10/15/19 1447       8        Wt Readings from Last 1 Encounters:   10/16/19 116 kg (255 lb 11 7 oz)     Additional Vital Signs:   10/16/19 0800  98 8 °F (37 1 °C)  83  18  147/67  94 %  None (Room air)  Lying   10/15/19 2234  97 7 °F (36 5 °C)  88  18  136/59  95 %       10/15/19 2232        132/74      Standing   10/15/19 2231        130/66      Sitting   10/15/19 2230        136/68      Lying   10/15/19 2025        130/60      Lying   10/15/19 1916          97 %  None (Room air)     10/15/19 1819    77  18  140/70  97 %  None (Room air)  Lying   10/15/19 1733    84  18  167/84  99 %  None (Room air)     10/15/19 1539            None (Room air)     10/15/19 1447  98 2 °F (36 8 °C)  81  18  185/75Abnormal   98 %  None (Room air)  Lying         Pertinent Labs/Diagnostic Test Results:   Results from last 7 days   Lab Units 10/16/19  0448 10/15/19  1800   WBC Thousand/uL 7 04 10 21*   HEMOGLOBIN g/dL 11 6 12 2   HEMATOCRIT % 37 3 37 4   PLATELETS Thousands/uL 308 309   NEUTROS ABS Thousands/µL  --  7 50         Results from last 7 days   Lab Units 10/16/19  0448 10/15/19  1800   SODIUM mmol/L 143 146* POTASSIUM mmol/L 4 2 3 8   CHLORIDE mmol/L 112* 110*   CO2 mmol/L 24 29   ANION GAP mmol/L 7 7   BUN mg/dL 11 10   CREATININE mg/dL 0 65 0 73   EGFR ml/min/1 73sq m 97 90   CALCIUM mg/dL 8 6 9 1             Results from last 7 days   Lab Units 10/16/19  0448 10/15/19  1800   GLUCOSE RANDOM mg/dL 91 98           Results from last 7 days   Lab Units 10/16/19  0209 10/15/19  2208 10/15/19  1857   TROPONIN I ng/mL <0 02 <0 02 <0 02                   Results from last 7 days   Lab Units 10/15/19  1859   CLARITY UA  Clear   COLOR UA  Yellow   SPEC GRAV UA  1 015   PH UA  7 5   GLUCOSE UA mg/dl Negative   KETONES UA mg/dl Negative   BLOOD UA  Negative   PROTEIN UA mg/dl Negative   NITRITE UA  Negative   BILIRUBIN UA  Negative   UROBILINOGEN UA E U /dl 0 2   LEUKOCYTES UA  Trace*   WBC UA /hpf 0-1*   RBC UA /hpf 0-1*   BACTERIA UA /hpf Occasional   EPITHELIAL CELLS WET PREP /hpf Occasional         CXR  (  10/15)  NAD  Ct  Head    (  10/15)    No acute  Intracranial abnormality    ED Treatment:   Medication Administration from 10/15/2019 1442 to 10/15/2019 2021       Date/Time Order Dose Route Action Action by Comments     10/15/2019 1632 acetaminophen (TYLENOL) tablet 650 mg 650 mg Oral Given Yessenia Nix RN      10/15/2019 1632 methocarbamol (ROBAXIN) tablet 1,000 mg 1,000 mg Oral Given Yessenia Nix RN      10/15/2019 1822 fentanyl citrate (PF) 100 MCG/2ML 75 mcg 75 mcg Intravenous Given Yessenia Nix RN         Past Medical History:   Diagnosis Date    Adrenal insufficiency (Andrés's disease) ([facility] [address] )     Bipolar disorder     Cervical radiculopathy     Chronic back pain     DVT, lower extremity ([facility] [address] ) 1991    Fibromyalgia     History of TIAs     cannot remember details    Hypertension     Hypokalemia     Migraine     MRSA (methicillin resistant Staphylococcus aureus) 07/20/2012    nasal swab negative 4/5/19    Psychiatric disorder     Anxiety, major depression, bipolar    Spinal stenosis     Syncope 2014 orthostatic hypotension     Present on Admission:   Fibromyalgia   Bipolar depression ([facility] [address] [facility]   Migraine   Osteoarthritis of lumbosacral spine without myelopathy   Ambulatory dysfunction   [facility]olism) ([facility])      Admitting Diagnosis: Neck pain [M54 2]  Age/Sex: 59 y o  female  Admission Orders:    Medications:  apixaban 5 mg Oral BID   ARIPiprazole 15 mg Oral Daily   cholecalciferol 5,000 Units Oral Daily   docusate sodium 100 mg Oral BID   ferrous sulfate 325 mg Oral Daily With Breakfast   fluvoxaMINE 300 mg Oral HS   gabapentin 600 mg Oral TID   lamoTRIgine 200 mg Oral Daily   lidocaine 1 patch Topical Daily   zonisamide 200 mg Oral Daily          acetaminophen 488 mg Oral Q6H PRN   LORazepam 2 mg Oral TID PRN   meclizine 12 5 mg Oral Q8H PRN   ondansetron 4 mg Intravenous Q4H PRN   traMADol 50 mg Oral Q6H PRN       IP CONSULT TO PSYCHIATRY  IP CONSULT TO NEUROLOGY  IP CONSULT TO CASE MANAGEMENT     Neuro  checks  Q 4 hrs  Orthostatic  BP  MRI  brain    Network Utilization Review Department  Phone: [phone]; Fax [phone]  [email or web address]  ATTENTION: Please call with any questions or concerns to [phone]  and carefully listen to the prompts so that you are directed to the right person  Send all requests for admission clinical reviews, approved or denied determinations and any other requests to fax [phone]   All voicemails are confidential

## 2019-10-16 NOTE — OCCUPATIONAL THERAPY NOTE
[address]  Evaluation     Patient Name: German Spencer  [facility]'S Date: 10/16/2019  Problem List  Principal Problem:    Vertigo  Active Problems:    Bipolar depression ([place] [address] )    Fibromyalgia    Osteoarthritis of lumbosacral spine without myelopathy    Migraine    History of TIAs    PE (pulmonary thromboembolism) ([place] [address] )    Ambulatory dysfunction    Closed head injury with brief loss of consciousness [facility])    Past Medical History  Past Medical History:   Diagnosis Date    Adrenal insufficiency ([place]'s disease) ([place] [address] )     Bipolar disorder     Cervical radiculopathy     Chronic back pain     DVT, lower extremity ([place] [address] ) 1991    Fibromyalgia     History of TIAs     cannot remember details    Hypertension     Hypokalemia     Migraine     MRSA (methicillin resistant Staphylococcus aureus) 07/20/2012    nasal swab negative 4/5/19    Psychiatric disorder     Anxiety, major depression, bipolar    Spinal stenosis     Syncope 2014    orthostatic hypotension     Past Surgical History  Past Surgical History:   Procedure Laterality Date    APPENDECTOMY      GASTRIC RESTRICTION SURGERY      Gastric Sleeve Nov 2015    HYSTERECTOMY      JOINT REPLACEMENT      Left Knee 2011 and Right Hip 2010           10/16/19 1226   Note Type   Note type Eval only   Restrictions/Precautions   Other Precautions Chair Alarm; Bed Alarm; Fall Risk;Pain;Multiple lines   Pain Assessment   Pain Assessment 0-10   Pain Score Worst Possible Pain   Pain Type Acute pain;Chronic pain   Pain Location Leg   Pain Orientation Right   Hospital Pain Intervention(s) Repositioned; Ambulation/increased activity; Emotional support; Rest   Response to Interventions Tolerated OT   Multiple Pain Sites No   Home Living   Type of Home House   Home Layout Two level; Able to live on main level with bedroom/bathroom;Stairs to enter with rails  (3 [address], 1st floor setup)   Bathroom Shower/Tub Walk-in shower   [address] bars in shower; Shower chair;Commode   Bathroom Accessibility Accessible   Home Equipment Walker;Cane   Additional Comments Pt lives with spouse in a multi-level house with 3 [address] and 1st floor setup  Pt (+) home alone during the day  Prior Function   Level of [place] Independent with ADLs and functional mobility; Needs assistance with IADLs   Lives With Spouse   Receives Help From Family   ADL Assistance Independent   IADLs Needs assistance   Falls in the last 6 months 1 to 4  (3 per pt report)   Vocational On disability   Comments At baseline, pt was I w/ ADLs and functional mobility/transfers w/ use of RW, required assist w/ IADLs, (+) , and (+) falls PTA   Lifestyle   Autonomy At baseline, pt was I w/ ADLs and functional mobility/transfers w/ use of RW, required assist w/ IADLs, (+) , and (+) falls PTA   Reciprocal Relationships Spouse   Service to Others On disability   Intrinsic Gratification Watching TV   Psychosocial   Psychosocial (WDL) WDL   ADL   Where Assessed Chair   Eating Assistance 5  Supervision/Setup   Grooming Assistance 5  Supervision/Setup   UB Bathing Assistance 5  Supervision/Setup   LB Bathing Assistance 4  Minimal Assistance   UB Dressing Assistance 5  Supervision/Setup   LB Dressing Assistance 3  Moderate [id number] [facility] 4  Minimal Assistance   Bed Mobility   Supine to Sit 5  Supervision   Additional items HOB elevated; Bedrails; Increased time required;Verbal cues   Additional Comments Pt seated OOB in chair at end of session with call bell and phone within reach  Chair alarm activated  All needs met and pt reports no further questions for OT at this time   Transfers   Sit to Stand 4  Minimal assistance   Additional items Assist x 2;Bedrails; Increased time required;Verbal cues   Stand to Sit 4  Minimal assistance   Additional items Assist x 2;Armrests; Increased time required;Verbal cues   Additional Comments Cues for safe technique and hand placement   Functional Mobility   Functional Mobility 4  Minimal assistance   Additional Comments Assist x2   Additional items Rolling walker   Balance   Static Sitting Fair +   Dynamic Sitting Fair -   Static Standing Poor +   Dynamic Standing Poor   Ambulatory Poor   Activity Tolerance   Activity Tolerance Patient limited by fatigue;Patient limited by pain   Medical Staff Made Aware Julian PT   Nurse Made Aware yes; Chelsey RN   RUE Assessment   RUE Assessment X  (decreased shldr ROM 2* pain; Elbow-distal=WFL)   LUE Assessment   LUE Assessment WFL   LUE Strength   LUE Overall Strength Within Functional Limits - able to perform ADL tasks with strength  (4/5 throughout)   Hand Function   Gross Motor Coordination Functional   Fine Motor Coordination Functional   Sensation   Light Touch Partial deficits in the RLE;Partial deficits in the LLE   Proprioception   Proprioception No apparent deficits   Vision - Complex Assessment   Ocular Range of Motion WFL   Acuity Able to read clock/calendar on wall without difficulty   Perception   Inattention/Neglect Appears intact   Cognition   Overall Cognitive Status [facility]   Arousal/Participation Alert; Cooperative   Attention Attends with cues to redirect   Orientation Level Oriented X4   Memory Decreased recall of precautions   Following Commands Follows one step commands without difficulty   Assessment   Limitation Decreased ADL status; Decreased UE strength;Decreased Safe judgement during ADL;Decreased endurance;Decreased self-care trans;Decreased high-level ADLs   Prognosis Good   Assessment Pt is a 59 y o  female seen for OT evaluation s/p adm to [facility] on 10/[facility]/2019 w/ Vertigo, dizziness, and s/p fall  Comorbidities affecting pts functional performance include a significant PMH of Bipolar disorder, cervical radiculopathy, DVT, Fibromyalgia, HTN, Hypokalemia, Migraine, spinal stenosis, and syncope   Pt with active OT orders and activity orders for Activity as tolerated  Pt lives with spouse in a multi-level house with 3 [address] and 1st floor setup  Pt (+) home alone during the day  At baseline, pt was I w/ ADLs and functional mobility/transfers w/ use of RW, required assist w/ IADLs, (+) , and (+) falls PTA  Upon evaluation, pt currently requires Supervision for UB ADLs, Mod-Min A for LB ADLs, Min A for toileting, Supervision for bed mobility, and Min A of 2 for functional mobility/transfers 2* the following deficits impacting occupational performance: weakness, decreased strength, decreased balance, decreased tolerance, decreased safety awareness and increased pain  These impairments, as well at pts steps to enter environment, limited home support, difficulty performing ADLS and difficulty performing IADLS  limit pts ability to safely engage in all baseline areas of occupation  Pt scored overall 50/100 on the Barthel Index  Pt to continue to benefit from continued acute OT services during hospital stay to address defined deficits and to maximize level of functional independence in the following Occupational Performance areas: grooming, bathing/shower, toilet hygiene, dressing, medication management, health maintenance, functional mobility, community mobility and clothing management  From OT standpoint, recommend STR upon D/C  OT will continue to follow pt 3-5x/wk to address the following goals to  w/in 10-14 days:   Goals   Patient Goals To get better   LTG Time Frame 10-14   Long Term Goal Please refer to LTGs listed below   Plan   Treatment Interventions ADL retraining;Functional transfer training;UE strengthening/ROM; Endurance training;Patient/family training;Equipment evaluation/education; Compensatory technique education; Energy conservation; Activityengagement   Goal Expiration Date 10/30/19   OT Treatment Day 0   OT Frequency 3-5x/wk   Recommendation   OT Discharge Recommendation Short Term Rehab   OT - OK to Discharge Yes  (when medically cleared to rehab)   Barthel Index   Feeding 10   Bathing 0   Grooming Score 5   Dressing Score 5   Bladder Score 10   Bowels Score 10   Toilet Use Score 5   Transfers (Bed/Chair) Score 5   Mobility (Level Surface) Score 0   Stairs Score 0   Barthel Index Score 50   Modified [place] Scale   Modified [place]is Scale 4        GOALS    1) Pt will improve activity tolerance to G for min 30 min txment sessions for increase engagement in functional tasks    2) Pt will complete UB/LB dressing/self care w/ mod I using adaptive device and DME as needed    3) Pt will complete bathing w/ Mod I w/ use of AE and DME as needed    4) Pt will complete toileting w/ mod I w/ G hygiene/thoroughness using DME as needed    5) Pt will improve functional transfers to Mod I on/off all surfaces using DME as needed w/ G balance/safety     6) Pt will improve functional mobility during ADL/IADL/leisure tasks to Mod I using DME as needed w/ G balance/safety     7) Pt will participate in simulated IADL management task to increase independence to Mod I w/ G safety and endurance    8) Pt will be attentive 100% of the time during ongoing cognitive assessment w/ G participation to assist w/ safe d/c planning/recommendations    9) Pt will demonstrate G carryover of pt/caregiver education and training as appropriate w/o cues w/ good tolerance to increase safety during functional tasks    10) Pt will demonstrate 100% carryover of energy conservation techniques t/o functional I/ADL/leisure tasks w/o cues s/p skilled education to increase endurance during functional tasks     11) Pt will verbalize 3 potential fall hazards and identify appropriate compensatory techniques to decrease fall risk in home environment         [place] OTR/L

## 2019-10-16 NOTE — CONSULTS
Consultation - Neurology   Maday Sample 59 y o  female MRN: [id number]  Unit/Bed#: E5 -01 Encounter: [id number]      Assessment/Plan   Assessment:  3 59year-old with DVT/PE on Eliquis, migraines, and multiple vascular risk factors who presented with a fall, right sided numbness, dizziness, and headache  Patient was admitted in the past with similar presentation  CTH was unremarkable  MRI negative for acute infarction  Suspect right sided numbness and headache likely due to migraine aura without headache  Suspect dizziness likely due to polypharmacy  Plan:  - Discussed with patient about following up with her outpatient PCP to discuss medication adjustments  - Continue meclizine 12 5mg Q8hrs PRN dizziness  - PT/OT  - Neuro checks  - Notify Neurology with any changes in neuro examination  - Medical management and supportive care as per primary team  - Patient should continue to follow up outpatient with Dr Nessa Franz at [facility]  Results:  1  CT head: no acute intracranial abnormality  2  CT cervical spine: no cervical spine fracture or traumatic malalignment    History of Present Illness     Reason for Consult / Principal Problem: Fall, History of TIAs, Dizziness  Hx and PE limited by: None  HPI: Maday Kearns is a 59 y o  female with DVT/PE on Eliquis, adrenal insufficiency, HTN, HLD, bipolar disorder, migraines, and fibromyalgia who presented to [facility] ED on 10/15 for a fall and dizziness  Patient was walking with her walker when she started to experience dizziness and fell to the ground  She reports that herself and the room were spinning  Patient fell on to her back  It is unclear if patient had LOC with the fall  Patient reports that she hit her head  Patient laid on the ground for 3 hours waiting for her home care nurse to arrive  Patient felt dizzy in the yesterday morning and took meclizine  She adds that the meclizine helped the dizziness but the dizziness did not fully resolve   The fall occurred four hours after the fall  Following the fall, patient was experiencing upper back pain and a headache that radiates down her neck and shoulders  BP on arrival was 185/75  CTH and CT cervical spine were unremarkable  Patient received Fentanyl 75mcg X 1 dose and Robaxin 1000mg X 1 dose  Of note, patient is known to the inpatient Neurology service  Patient was recently seen on 10/4 for near syncope, intractable migraine, and right sided numbness  During admission, patient received Depacon and magnesium for her migraine pain  The near syncopal episode was thought to be due to polypharmacy  During past admissions, patient has received DHE and ketamine  Patient follows outpatient at [facility] with Dr Deni Regan and at [facility] for migraine management  Today, patient states that she is still experiencing dizziness  She reports that she is having a slight headache  Patient also reports right sided numbness and pain  She denies chest pain, shortness of breath, and abdominal pain  Inpatient consult to Neurology  Consult performed by: Fernando Aquino PA-C  Consult ordered by: Raymond Hadley DO        ROS:  A 12 point ROS was completed  Other than the above mentioned complaints in the HPI, all remaining systems were negative      Historical Information   Past Medical History:   Diagnosis Date    Adrenal insufficiency ([place]'s disease) ([facility] [address] )     Bipolar disorder     Cervical radiculopathy     Chronic back pain     DVT, lower extremity ([facility] [address] ) 1991    Fibromyalgia     History of TIAs     cannot remember details    Hypertension     Hypokalemia     Migraine     MRSA (methicillin resistant Staphylococcus aureus) 07/20/2012    nasal swab negative 4/5/19    Psychiatric disorder     Anxiety, major depression, bipolar    Spinal stenosis     Syncope 2014    orthostatic hypotension     Past Surgical History:   Procedure Laterality Date    APPENDECTOMY      GASTRIC RESTRICTION SURGERY Gastric Sleeve 2015    HYSTERECTOMY      JOINT REPLACEMENT      Left Knee  and Right Hip      Social History   Social History     Substance and Sexual Activity   Alcohol Use Not Currently    Frequency: Never    Binge frequency: Never     Social History     Substance and Sexual Activity   Drug Use Never     Social History     Tobacco Use   Smoking Status Former Smoker    Packs/day: 0 20    Types: Cigarettes    Last attempt to quit:     Years since quittin 7   Smokeless Tobacco Never Used     Family History:   Family History   Problem Relation Age of Onset    Breast cancer Mother     Migraines Mother     Arthritis Mother     Kidney disease Father     Heart disease Father     Diabetes Father     Arthritis Father     Brain cancer Brother     Seizures Brother        Review of previous medical records was  completed       Meds/Allergies   current meds:   Current Facility-Administered Medications   Medication Dose Route Frequency    acetaminophen (TYLENOL) tablet 488 mg  488 mg Oral Q6H PRN    apixaban (ELIQUIS) tablet 5 mg  5 mg Oral BID    ARIPiprazole (ABILIFY) tablet 15 mg  15 mg Oral Daily    cholecalciferol (VITAMIN D3) tablet 5,000 Units  5,000 Units Oral Daily    docusate sodium (COLACE) capsule 100 mg  100 mg Oral BID    ferrous sulfate tablet 325 mg  325 mg Oral Daily With Breakfast    fluvoxaMINE (LUVOX) tablet 300 mg  300 mg Oral HS    gabapentin (NEURONTIN) capsule 600 mg  600 mg Oral TID    lamoTRIgine (LaMICtal) tablet 200 mg  200 mg Oral Daily    lidocaine (LIDODERM) 5 % patch 1 patch  1 patch Topical Daily    LORazepam (ATIVAN) tablet 2 mg  2 mg Oral TID PRN    meclizine (ANTIVERT) tablet 12 5 mg  12 5 mg Oral Q8H PRN    ondansetron (ZOFRAN) injection 4 mg  4 mg Intravenous Q4H PRN    traMADol (ULTRAM) tablet 50 mg  50 mg Oral Q6H PRN    zonisamide (ZONEGRAN) capsule 200 mg  200 mg Oral Daily    and PTA meds:   Prior to Admission Medications Prescriptions Last Dose Informant Patient Reported? Taking? ARIPiprazole (ABILIFY) 15 mg tablet 10/15/2019 at Unknown time  Yes Yes   Sig: Take 15 mg by mouth daily   Erenumab-aooe (AIMOVIG) 140 MG/ML SOAJ Past Month at Unknown time  Yes Yes   Sig: Inject 140 mg under the skin every 30 (thirty) days    LORazepam (ATIVAN) 1 mg tablet Past Month at Unknown time  Yes Yes   Sig: Take 2 mg by mouth 3 (three) times a day as needed    acetaminophen (TYLENOL) 500 mg tablet Past Month at Unknown time  No Yes   Sig: Take 1 tablet (500 mg total) by mouth every 6 (six) hours as needed (pain)   apixaban (ELIQUIS) 5 mg 10/15/2019 at Unknown time  Yes Yes   Sig: Take 5 mg by mouth 2 (two) times a day   cholecalciferol (VITAMIN D3) 1,000 units tablet 10/15/2019 at Unknown time  Yes Yes   Sig: Take 5,000 Units by mouth daily   eszopiclone (LUNESTA) 3 MG tablet 10/15/2019 at Unknown time  Yes Yes   Sig: Take 3 mg by mouth daily   ferrous sulfate 325 (65 Fe) mg tablet 10/15/2019 at Unknown time  Yes Yes   Sig: Take 325 mg by mouth daily with breakfast   fluvoxaMINE (LUVOX) 100 mg tablet 10/14/2019 at Unknown time  Yes Yes   Sig: Take 300 mg by mouth daily at bedtime     gabapentin (NEURONTIN) 600 MG tablet 10/15/2019 at Unknown time  Yes Yes   Sig: Take 600 mg by mouth 3 (three) times a day    haloperidol (HALDOL) 5 mg tablet Past Month at Unknown time  Yes Yes   Sig: Take one half tab orally twice a day as needed for headache or nausea  Max 2 tabs/day, 3 days/week   lamoTRIgine (LaMICtal) 200 MG tablet 10/15/2019 at Unknown time  Yes Yes   Sig: Take 200 mg by mouth daily     meclizine (ANTIVERT) 12 5 MG tablet 10/15/2019 at Unknown time  No Yes   Sig: Take 1 tablet (12 5 mg total) by mouth every 8 (eight) hours as needed for dizziness   mexiletine (MEXITIL) 150 mg capsule 10/15/2019 at Unknown time  Yes Yes   Sig: Take 150 mg by mouth 3 (three) times a day   ondansetron (ZOFRAN) 4 mg tablet Past Month at Unknown time  No Yes Sig: Take 1 tablet (4 mg total) by mouth every 8 (eight) hours as needed for nausea or vomiting   zonisamide (ZONEGRAN) 100 mg capsule 10/15/2019 at Unknown time  Yes Yes   Sig: Take 200 mg by mouth daily      Facility-Administered Medications: None       Allergies   Allergen Reactions    Ketorolac Itching and Other (See Comments)     [toradol] Itching, hives    Mushroom Extract Complex        Objective   Vitals:Blood pressure 147/67, pulse 83, temperature 98 8 °F (37 1 °C), temperature source Temporal, resp  rate 18, height 5' 8" (1 727 m), weight 116 kg (255 lb 11 7 oz), SpO2 94 %  ,Body mass index is 38 88 kg/m²  No intake or output data in the 24 hours ending 10/16/19 0839    Invasive Devices: Invasive Devices     Peripheral Intravenous Line            Peripheral IV 10/15/19 Left;Dorsal (posterior) Forearm less than 1 day              Physical Exam   Constitutional: She is oriented to person, place, and time  No distress  Female resting comfortably in bed   HENT:   Head: Normocephalic and atraumatic  Right Ear: External ear normal    Left Ear: External ear normal    Nose: Nose normal    Eyes: Pupils are equal, round, and reactive to light  EOM are normal    Pulmonary/Chest: No respiratory distress  Neurological: She is alert and oriented to person, place, and time  She has normal strength  She has a normal Finger-Nose-Finger Test    Reflex Scores:       Bicep reflexes are 2+ on the right side and 2+ on the left side  Brachioradialis reflexes are 2+ on the right side and 2+ on the left side  Patellar reflexes are 0 on the right side and 0 on the left side  Achilles reflexes are 1+ on the right side and 1+ on the left side  Skin: Skin is warm and dry  She is not diaphoretic  Psychiatric: Her speech is normal  Judgment and thought content normal      Neurologic Exam     Mental Status   Oriented to person, place, and time     Attention: normal  Concentration: normal    Speech: speech is normal   Patient is alert and oriented to person, place, and date     Cranial Nerves     CN II   Visual fields full to confrontation  CN III, IV, VI   Pupils are equal, round, and reactive to light  Extraocular motions are normal    Nystagmus: none   Conjugate gaze: present    CN V   Right facial sensation deficit: Reports decreased R facial sensation  CN VII   Facial expression full, symmetric  CN VIII   Hearing: intact    CN IX, X   CN IX normal      CN XII   CN XII normal      Motor Exam   Muscle bulk: normal  Overall muscle tone: normal    Strength   Strength 5/5 throughout  Sensory Exam   Patient reports decreased sensation to light touch, temperature, and vibration in RUE and RLE     Gait, Coordination, and Reflexes     Coordination   Finger to nose coordination: normal    Tremor   Resting tremor: absent    Reflexes   Right brachioradialis: 2+  Left brachioradialis: 2+  Right biceps: 2+  Left biceps: 2+  Right patellar: 0  Left patellar: 0  Right achilles: 1+  Left achilles: 1+    Lab Results: I have personally reviewed pertinent reports  Imaging Studies: I have personally reviewed pertinent reports  and I have personally reviewed pertinent films in PACS  EKG, Pathology, and Other Studies: I have personally reviewed pertinent reports      VTE Prophylaxis: Eliquis    Code Status: Level 1 - Full Code  Advance Directive and Living Will:      Power of :    POLST:

## 2019-10-16 NOTE — ASSESSMENT & PLAN NOTE
Patient reports weakness - ongoing    PT/OT/CM consultation placed      No evidence of an acute pathology for the patient's ambulatory dysfunction

## 2019-10-16 NOTE — ASSESSMENT & PLAN NOTE
Recommend gated exercise program as an outpatient    Physical therapy and occupational therapy consultation reviewed with recommendation for short-term nursing facility placement

## 2019-10-16 NOTE — ASSESSMENT & PLAN NOTE
Continue current regimen  No active migraine      Previous admission beginning of October, followed by [facility]    10/02- 10/07 - Previous admission   She was given three day course of valproic acid and magnesium

## 2019-10-16 NOTE — PLAN OF CARE
Problem: PHYSICAL THERAPY ADULT  Goal: Performs mobility at highest level of function for planned discharge setting  See evaluation for individualized goals  Description  Treatment/Interventions: LE strengthening/ROM, Functional transfer training, Elevations, Therapeutic exercise, Endurance training, Patient/family training, Bed mobility, Gait training, Spoke to nursing, OT, Family  Equipment Recommended: Walker(RW)       See flowsheet documentation for full assessment, interventions and recommendations  Note:   Prognosis: Good  Problem List: Decreased strength, Decreased endurance, Decreased range of motion, Impaired balance, Decreased mobility, Impaired judgement, Decreased safety awareness, Obesity, Pain  Assessment:  Pt  59 y  o female presented after a fall due to dizziness  Pt reports she was down on the floor x3hrs  Pt also c/o neck pain  CT showed no cervical spine fx or traumatic malalignment & no acute intracranial abnormality  Pt admitted for possible syncope w/ Neck pain M54 2  Per chart, suspect some psych component  Of note, pt was recently admitted here in [facility] from 10/2/19-10/7/19 for migraine & dizziness  Pt referred to PT for mobility assessment & D/C planning w/ orders of activity as tolerated  PTA, pt reports that she was fairly  I w/ RW upon D/C from hospital on 10/7/19 but noted slow decline in function since then  On eval, pt demonstrate dec mobility, balance, endurance & amb  Pt require minAx2 for most functional mobility + cues for techniques  Gait deviations as above, slow & unsteady w/ shuffling gait but no gross LOB noted  No dizziness reported t/o session  Nsg staff most recent vital signs as follows: /67 (BP Location: Right arm)   Pulse 83   Temp 98 8 °F (37 1 °C) (Temporal)   Resp 18   Ht 5' 8" (1 727 m)   Wt 116 kg (255 lb 11 7 oz)   SpO2 94%   BMI 38 88 kg/m²    At end of session, pt tolerated OOB in chair w/o issues, call bell & phone in reach, chair alarm activated  Fall precautions reinforced w/ good understanding  Pt functioning below baseline hence will continue skilled PT to improve function & safety  At this time, due to above mentioned deficits, dec caregiver support & high risk for falls, pt will benefit from inpt rehab at D/C  Pt agreeable to D/C rec  CM to follow  Nsg staff to continue to mobilized pt (OOB in chair for all meals & ambulate in room/unit) as tolerated to prevent further decline in function  Nsg notified  Barriers to Discharge: Inaccessible home environment, Decreased caregiver support     Recommendation: Short-term skilled PT     PT - OK to Discharge: Yes(to inpt rehab when medically cleared)    See flowsheet documentation for full assessment

## 2019-10-17 PROCEDURE — 99225 PR SBSQ OBSERVATION CARE/DAY 25 MINUTES: CPT | Performed by: INTERNAL MEDICINE

## 2019-10-17 RX ADMIN — DOCUSATE SODIUM 100 MG: 100 CAPSULE, LIQUID FILLED ORAL at 17:28

## 2019-10-17 RX ADMIN — GABAPENTIN 600 MG: 300 CAPSULE ORAL at 08:26

## 2019-10-17 RX ADMIN — GABAPENTIN 600 MG: 300 CAPSULE ORAL at 15:46

## 2019-10-17 RX ADMIN — ARIPIPRAZOLE 7.5 MG: 5 TABLET ORAL at 08:26

## 2019-10-17 RX ADMIN — FLUVOXAMINE MALEATE 150 MG: 50 TABLET, FILM COATED ORAL at 21:20

## 2019-10-17 RX ADMIN — ACETAMINOPHEN 488 MG: 325 TABLET ORAL at 06:10

## 2019-10-17 RX ADMIN — FERROUS SULFATE TAB 325 MG (65 MG ELEMENTAL FE) 325 MG: 325 (65 FE) TAB at 08:27

## 2019-10-17 RX ADMIN — GABAPENTIN 600 MG: 300 CAPSULE ORAL at 21:20

## 2019-10-17 RX ADMIN — LORAZEPAM 2 MG: 1 TABLET ORAL at 09:42

## 2019-10-17 RX ADMIN — LORAZEPAM 2 MG: 1 TABLET ORAL at 21:23

## 2019-10-17 RX ADMIN — ONDANSETRON 4 MG: 2 INJECTION INTRAMUSCULAR; INTRAVENOUS at 17:31

## 2019-10-17 RX ADMIN — DOCUSATE SODIUM 100 MG: 100 CAPSULE, LIQUID FILLED ORAL at 08:26

## 2019-10-17 RX ADMIN — ZONISAMIDE 200 MG: 100 CAPSULE ORAL at 08:30

## 2019-10-17 RX ADMIN — ONDANSETRON 4 MG: 2 INJECTION INTRAMUSCULAR; INTRAVENOUS at 13:32

## 2019-10-17 RX ADMIN — APIXABAN 5 MG: 5 TABLET, FILM COATED ORAL at 17:28

## 2019-10-17 RX ADMIN — LIDOCAINE 1 PATCH: 50 PATCH TOPICAL at 08:29

## 2019-10-17 RX ADMIN — ONDANSETRON 4 MG: 2 INJECTION INTRAMUSCULAR; INTRAVENOUS at 04:28

## 2019-10-17 RX ADMIN — APIXABAN 5 MG: 5 TABLET, FILM COATED ORAL at 08:26

## 2019-10-17 RX ADMIN — MELATONIN 5000 UNITS: at 08:26

## 2019-10-17 RX ADMIN — LAMOTRIGINE 200 MG: 100 TABLET ORAL at 08:27

## 2019-10-17 NOTE — PROGRESS NOTES
Progress Note - [address] LORIE Torre 59 y o  female MRN: [id number]  Unit/Bed#: E5 -01 Encounter: [id number]    The patient was seen for continuing care and reviewed with treatment team   Patient was seen for psychiatric follow-up  Patient was observed sitting up in chair  Patient was pleasant and cooperative upon approach and displayed good eye contact with a brighter affect  Patient currently rates her depression as a 4/10 with mild anxiety noted  Patient stated the reason her depression was a little higher than yesterday was because she is going to a rehabilitation center to help with her walking but today she is supposed to go her daughter will be coming to visit  She states that she is a little depressed that she will not be able to do things with her daughter while she is here  Patient is denying any suicidal/homicidal ideation or auditory/visual hallucination and does not appear to be responding to internal stimuli at this time  Patient stated she feels better since decrease in medications and states she has not had a dizzy spell" or felt like she was going to pass out  Patient stated she is happy with the reduction in dosage of psychiatric medications  Patient reported better sleep but states she still feels fatigued  At this time, there is no further need for psychiatric services  Psych will sign off at this time      Mental Status Evaluation:  Appearance:  Adequate hygiene and grooming and Good eye contact   Behavior:  cooperative and friendly   Mood:  improving   Affect: mood-congruent   Speech: Normal rate and Normal volume   Thought Process:  Goal directed and coherent   Thought Content:  Does not verbalize delusional material   Perceptual Disturbances: Denies hallucinations and does not appear to be responding to internal stimuli   Risk Potential: No suicidal or homicidal ideation   Attention/Concentration attention span and concentration were age appropriate Orientation:   Alert and awake   Gait/Station: Not observed   Motor Activity: No abnormal movement noted     Progress Toward Goals:  Continues to improve    Assessment/Plan    Principal Problem:    Vertigo  Active Problems:    Bipolar depression (HCC)    Fibromyalgia    Osteoarthritis of lumbosacral spine without myelopathy    Migraine    History of TIAs    PE (pulmonary thromboembolism) (HCC)    Ambulatory dysfunction    Closed head injury with brief loss of consciousness (HCC)    Shoulder pain    Morbid obesity with BMI of 40 0-44 9, adult ([facility] [address] )      Recommended Treatment:   1  Patient can continue to follow up with outpatient providers for psychiatric services/psychotherapy upon discharge  2  Patient will continue on the current psychiatric medications as prescribed  3  Psych will sign off at this time  4  The patient will be maintained on the following medications:    Current Facility-Administered Medications:  acetaminophen 488 mg Oral Q6H PRN Thanh HOWARD Davidson, DO   apixaban 5 mg Oral BID Shaun D Gill, DO   ARIPiprazole 7 5 mg Oral Daily MOON Samuels   cholecalciferol 5,000 Units Oral Daily Shaun D Gill, DO   docusate sodium 100 mg Oral BID Shaun D Gill, DO   ferrous sulfate 325 mg Oral Daily With Breakfast Thanh HOWARD Davidson, DO   fluvoxaMINE 150 mg Oral HS MOON Calle   gabapentin 600 mg Oral TID Shaun D Gill, DO   lamoTRIgine 200 mg Oral Daily Shaun D Gill, DO   lidocaine 1 patch Topical Daily Shaun D Gill, DO   LORazepam 2 mg Oral TID PRN George Simmons, DO   meclizine 12 5 mg Oral Q8H PRN Thanh HOWARD Davidson, DO   ondansetron 4 mg Intravenous Q4H PRN Thanh D Gill, DO   traMADol 50 mg Oral Q6H PRN Edwige Grissom PA-C   zonisamide 200 mg Oral Daily Thanh HOWARD Davidson, DO       Risks, benefits and possible side effects of Medications:   Risks, benefits, and possible side effects of medications explained to patient and patient verbalizes understanding         MOON Samuels      Portions of the record may have been created with voice recognition software  Occasional wrong word or "sound a like" substitutions may have occurred due to the inherent limitations of voice recognition software  Read the chart carefully and recognize, using context, where substitutions have occurred

## 2019-10-17 NOTE — UTILIZATION REVIEW
Notification of Observation Care Status/Observation Authorization Request    This is a Notification of Observation Care Status to our facility [facility]  Be advised that this patient was admitted to our facility under Observation Status  Please contact the Utilization Review Department where the patient is receiving care services for additional admission information  Facility: [facility] ([address] [place] [address])  Place of Service Code: 22  Place of Service Name: [facility]  CPT Code for Observation: CPT  / CPT 06764    Presentation Date & Time: 10/15/2019  2:43 PM  Observation Admission Date & Time:   10/15/2019 1827 Patient Update [place] ED Observation General Medicine Med Surg M/S [facility]   10/15/2019 2004 Transfer Out [facility] ED Observation General Medicine Med Surg M/S [facility]   10/15/2019 2004 Transfer In [place] ED Observation General Medicine Med Surg M/S [facility]   10/15/2019 2021 Transfer Out [facility] ED Observation General Medicine Med Surg M/S [facility]   10/15/2019 2021 Transfer In AL EAST 5 MED SURG Observation General Medicine Med Surg M/S [facility]     Discharge Date & Time: No discharge date for patient encounter  Discharge Disposition (if discharged): Home with [facility]  Attending Physician and NPI#: Fermin Salmon [[id number]]  Admission Orders (From admission, onward)     Ordered        10/15/19 1827  Place in Observation (expected length of stay for this patient is less than two midnights)  Once         10/15/19 1755  Inpatient Admission  Once,   Status:  [address] Utilization Review Department  Phone: [phone]; Fax [phone]  [email or web address]  ATTENTION: Please call with any questions or concerns to [phone]  and carefully listen to the prompts so that you are directed to the right person     Send all requests for admission clinical reviews, approved or denied determinations and any other requests to fax [phone]   All voicemails are confidential

## 2019-10-17 NOTE — PROGRESS NOTES
Progress Note - Jacky Gutiérrez 1960, 59 y o  female MRN: [id number]    Unit/Bed#: E5 -01 Encounter: [id number]    Primary Care Provider: Brandon Garcia DO   Date and time admitted to hospital: 10/15/2019  2:43 PM        Migraine  Assessment & Plan  Continue current regimen  No active migraine  Previous admission beginning of October, followed by [facility]rology    10/02- 10/07 - Previous admission   She was given three day course of valproic acid and magnesium        Osteoarthritis of lumbosacral spine without myelopathy  Assessment & Plan  Known OA  PT/OT evals      Fibromyalgia  Assessment & Plan  Recommend gated exercise program as an outpatient    Physical therapy and occupational therapy consultation reviewed with recommendation for short-term nursing facility placement    Does have chronic pain and particularly in the right shoulder for which no fracture is noted  Bipolar depression [facility])  Assessment & Plan  Psychiatric consultation placed, and recommendations were reviewed  The patient has no homicidal or suicidal ideation    * Vertigo  Assessment & Plan  Vertigo felt to be medication induced  The patient's Abilify was reduced to 7 5 mg, in addition her Luvox was reduced to 150 mg  She no longer reports having dizziness at this time  MRI of the brain was negative for acute infarct and Neurology did sign off  At this time the patient is medically stable for discharge      The patient had 1 episode of nausea of unclear significance for which she was given Zofran    Medically stable for discharge at this time          VTE Pharmacologic Prophylaxis:   Pharmacologic: Apixaban (Eliquis)  Mechanical VTE Prophylaxis in Place: Yes    Patient Centered Rounds: I have performed bedside rounds with nursing staff today      Discussions with Specialists or Other Care Team Provider:  Reviewed nephrology and Psychiatry recommendation    Education and Discussions with Family / Patient: Patient    Time Spent for Care: 20 minutes  More than 50% of total time spent on counseling and coordination of care as described above  Current Length of Stay: 1 day(s)    Current Patient Status: Observation   Certification Statement: The patient will continue to require additional inpatient hospital stay due to Medically stable for discharge, pending discharge planning    Discharge Plan:  Short-term nursing facility versus home    Code Status: Level 1 - Full Code      Subjective:   Patient seen and examined on morning rounds, she denies any significant chest pain shortness of breath or difficulty breathing  Did review MRI of the brain as well as psychiatric recommendations  Her vertigo is now resolved  The patient is concerned about her cataract eye surgery scheduled for   She did have 1 episode of emesis of unclear significance and etiology  No other abdominal pain no nausea diarrhea  Objective:     Vitals:   Temp (24hrs), Av 3 °F (36 3 °C), Min:97 2 °F (36 2 °C), Max:97 4 °F (36 3 °C)    Temp:  [97 2 °F (36 2 °C)-97 4 °F (36 3 °C)] 97 4 °F (36 3 °C)  HR:  [66-81] 66  Resp:  [18-20] 20  BP: (110-118)/(65-74) 118/66  SpO2:  [91 %-95 %] 95 %  Body mass index is 40 63 kg/m²  Input and Output Summary (last 24 hours):        Intake/Output Summary (Last 24 hours) at 10/17/2019 1345  Last data filed at 10/17/2019 0901  Gross per 24 hour   Intake 240 ml   Output    Net 240 ml       Physical Exam:       General: well appearing, no acute distress  HEENT: atraumatic, PERRLA, moist mucosa, normal pharynx, normal tonsils and adenoids, normal tongue, no fluid in sinuses  Neck: Trachea midline, no carotid bruit, no masses  Respiratory: normal chest wall expansion, CTA B, no r/r/w, no rubs  Cardiovascular: RRR, no m/r/g, Normal S1 and S2  Abdomen: Soft, non-tender, non-distended, normal bowel sounds in all quadrants, no hepatosplenomegaly, no tympany  Rectal: deferred  Musculoskeletal: normal ROM in upper and lower extremities  Integumentary: warm, dry, and pink, with no rash, purpura, or petechia  Heme/Lymph: no lymphadenopathy, no bruises  Neurological: Cranial Nerves II-XII grossly intact, no tics, normal sensation to pressure and light touch  Psychiatric: cooperative with normal mood, affect, and cognition      Additional Data:     Labs:    Results from last 7 days   Lab Units 10/16/19  0448 10/15/19  1800   WBC Thousand/uL 7 04 10 21*   HEMOGLOBIN g/dL 11 6 12 2   HEMATOCRIT % 37 3 37 4   PLATELETS Thousands/uL 308 309   NEUTROS PCT %  --  74   LYMPHS PCT %  --  16   MONOS PCT %  --  10   EOS PCT %  --  0     Results from last 7 days   Lab Units 10/16/19  0448   SODIUM mmol/L 143   POTASSIUM mmol/L 4 2   CHLORIDE mmol/L 112*   CO2 mmol/L 24   BUN mg/dL 11   CREATININE mg/dL 0 65   ANION GAP mmol/L 7   CALCIUM mg/dL 8 6   GLUCOSE RANDOM mg/dL 91                           * I Have Reviewed All Lab Data Listed Above  * Additional Pertinent Lab Tests Reviewed:  Neeraj Girard Admission Reviewed    Imaging:    Imaging Reports Reviewed Today Include:  MRI of the brain  Imaging Personally Reviewed by Myself Includes:  MRI of the brain    Recent Cultures (last 7 days):           Last 24 Hours Medication List:     Current Facility-Administered Medications:  acetaminophen 488 mg Oral Q6H PRN Thanh Davidson, DO   apixaban 5 mg Oral BID Thanh Davidson, DO   ARIPiprazole 7 5 mg Oral Daily MOON Pink   cholecalciferol 5,000 Units Oral Daily Thanh Davidson, DO   docusate sodium 100 mg Oral BID Thanh Davidson, DO   ferrous sulfate 325 mg Oral Daily With Breakfast Thanh Davidson, DO   fluvoxaMINE 150 mg Oral HS MOON Calle   gabapentin 600 mg Oral TID Thanh Davidson, DO   lamoTRIgine 200 mg Oral Daily Thanh Davidson, DO   lidocaine 1 patch Topical Daily Thanh Davidson, DO   LORazepam 2 mg Oral TID PRN Elizabeth Gourd, DO   meclizine 12 5 mg Oral Q8H PRN Thanh Davidson, DO   ondansetron 4 mg Intravenous Q4H PRN Merline Massing, DO   traMADol 50 mg Oral Q6H PRN Edwige Grissom PA-C   zonisamide 200 mg Oral Daily Merline Massing, DO        Today, Patient Was Seen By: Leon Zheng DO    ** Please Note: Dictation voice to text software may have been used in the creation of this document   **

## 2019-10-17 NOTE — UTILIZATION REVIEW
Continued Stay Review    Date:    10/17/2019                          Current Patient Class:   Observation  Current Level of Care:    Med  Surg    HPI:58 y o  female initially admitted on    10/15  @     [address]      Assessment/Plan: Migraine  Assessment & Plan  Continue current regimen  No active migraine      Previous admission beginning of October, followed by [facility] Neurology     10/02- 10/07 - Previous admission   She was given three day course of valproic acid and magnesium           Osteoarthritis of lumbosacral spine without myelopathy  Assessment & Plan  Known OA  PT/OT emmanuelals  Fibromyalgia  Assessment & Plan  Recommend gated exercise program as an outpatient     Physical therapy and occupational therapy consultation reviewed with recommendation for short-term nursing facility placement     Does have chronic pain and particularly in the right shoulder for which no fracture is noted      Bipolar depression [facility])  Assessment & Plan  Psychiatric consultation placed, and recommendations were reviewed  The patient has no homicidal or suicidal ideation     * Vertigo  Assessment & Plan  Vertigo felt to be medication induced  The patient's Abilify was reduced to 7 5 mg, in addition her Luvox was reduced to 150 mg    She no longer reports having dizziness at this time        Pertinent Labs/Diagnostic Results:   Results from last 7 days   Lab Units 10/16/19  0448 10/15/19  1800   WBC Thousand/uL 7 04 10 21*   HEMOGLOBIN g/dL 11 6 12 2   HEMATOCRIT % 37 3 37 4   PLATELETS Thousands/uL 308 309   NEUTROS ABS Thousands/µL  --  7 50         Results from last 7 days   Lab Units 10/16/19  0448 10/15/19  1800   SODIUM mmol/L 143 146*   POTASSIUM mmol/L 4 2 3 8   CHLORIDE mmol/L 112* 110*   CO2 mmol/L 24 29   ANION GAP mmol/L 7 7   BUN mg/dL 11 10   CREATININE mg/dL 0 65 0 73   EGFR ml/min/1 73sq m 97 90   CALCIUM mg/dL 8 6 9 1             Results from last 7 days   Lab Units 10/16/19  0448 10/15/19  1800   GLUCOSE RANDOM mg/dL 91 98           Results from last 7 days   Lab Units 10/16/19  0209 10/15/19  2208 10/15/19  1857   TROPONIN I ng/mL <0 02 <0 02 <0 02           Results from last 7 days   Lab Units 10/15/19  1859   CLARITY UA  Clear   COLOR UA  Yellow   SPEC GRAV UA  1 015   PH UA  7 5   GLUCOSE UA mg/dl Negative   KETONES UA mg/dl Negative   BLOOD UA  Negative   PROTEIN UA mg/dl Negative   NITRITE UA  Negative   BILIRUBIN UA  Negative   UROBILINOGEN UA E U /dl 0 2   LEUKOCYTES UA  Trace*   WBC UA /hpf 0-1*   RBC UA /hpf 0-1*   BACTERIA UA /hpf Occasional   EPITHELIAL CELLS WET PREP /hpf Occasional     Vital Signs:   97 4 °F (36 3 °C)Abnormal   66  20  118/66  95 %  None (Room air)  Sitting         Medications:     Medications:  apixaban 5 mg Oral BID   ARIPiprazole 7 5 mg Oral Daily   cholecalciferol 5,000 Units Oral Daily   docusate sodium 100 mg Oral BID   ferrous sulfate 325 mg Oral Daily With Breakfast   fluvoxaMINE 150 mg Oral HS   gabapentin 600 mg Oral TID   lamoTRIgine 200 mg Oral Daily   lidocaine 1 patch Topical Daily   zonisamide 200 mg Oral Daily          acetaminophen 488 mg Oral Q6H PRN   LORazepam 2 mg Oral TID PRN   meclizine 12 5 mg Oral Q8H PRN   ondansetron 4 mg Intravenous Q4H PRN   traMADol 50 mg Oral Q6H PRN       Discharge Plan:    [facility]    Network Utilization Review Department  Phone: [phone]; Fax [phone]  [email or web address]  ATTENTION: Please call with any questions or concerns to [phone]  and carefully listen to the prompts so that you are directed to the right person  Send all requests for admission clinical reviews, approved or denied determinations and any other requests to fax [phone]   All voicemails are confidential

## 2019-10-17 NOTE — ASSESSMENT & PLAN NOTE
Continue current regimen  No active migraine      Previous admission beginning of October, followed by [facility] Neurology    10/02- 10/07 - Previous admission   She was given three day course of valproic acid and magnesium

## 2019-10-17 NOTE — ASSESSMENT & PLAN NOTE
Recommend gated exercise program as an outpatient    Physical therapy and occupational therapy consultation reviewed with recommendation for short-term nursing facility placement    Does have chronic pain and particularly in the right shoulder for which no fracture is noted

## 2019-10-17 NOTE — CASE MANAGEMENT
CM received acceptance for [facility], met with patient and she is agreeable to [facility]  CM initiated referral process for [facility], called [phone], spoke to [facility] and REF# [id number] was created for stay from 10/17 to 10/24 for 7 days  CM routed requested clinical (H&P, Consults, Progress Notes, and PT/OT notes) in EPIC to [phone] marked for urgent review  CM informed [facility] physician that patient was accepted to [facility] but needed authorization  CM department will continue to follow through discharge

## 2019-10-17 NOTE — ASSESSMENT & PLAN NOTE
Vertigo felt to be medication induced  The patient's Abilify was reduced to 7 5 mg, in addition her Luvox was reduced to 150 mg  She no longer reports having dizziness at this time  MRI of the brain was negative for acute infarct and Neurology did sign off      At this time the patient is medically stable for discharge      The patient had 1 episode of nausea of unclear significance for which she was given Zofran    Medically stable for discharge at this time

## 2019-10-17 NOTE — ASSESSMENT & PLAN NOTE
Psychiatric consultation placed, and recommendations were reviewed    The patient has no homicidal or suicidal ideation

## 2019-10-18 PROCEDURE — 99232 SBSQ HOSP IP/OBS MODERATE 35: CPT | Performed by: INTERNAL MEDICINE

## 2019-10-18 PROCEDURE — 97530 THERAPEUTIC ACTIVITIES: CPT

## 2019-10-18 PROCEDURE — 97116 GAIT TRAINING THERAPY: CPT

## 2019-10-18 PROCEDURE — 97110 THERAPEUTIC EXERCISES: CPT

## 2019-10-18 PROCEDURE — 97535 SELF CARE MNGMENT TRAINING: CPT

## 2019-10-18 RX ADMIN — DOCUSATE SODIUM 100 MG: 100 CAPSULE, LIQUID FILLED ORAL at 08:50

## 2019-10-18 RX ADMIN — ACETAMINOPHEN 488 MG: 325 TABLET ORAL at 08:50

## 2019-10-18 RX ADMIN — FLUVOXAMINE MALEATE 150 MG: 50 TABLET, FILM COATED ORAL at 21:45

## 2019-10-18 RX ADMIN — ONDANSETRON 4 MG: 2 INJECTION INTRAMUSCULAR; INTRAVENOUS at 19:53

## 2019-10-18 RX ADMIN — DOCUSATE SODIUM 100 MG: 100 CAPSULE, LIQUID FILLED ORAL at 17:06

## 2019-10-18 RX ADMIN — LORAZEPAM 2 MG: 1 TABLET ORAL at 08:50

## 2019-10-18 RX ADMIN — MELATONIN 5000 UNITS: at 08:50

## 2019-10-18 RX ADMIN — ARIPIPRAZOLE 7.5 MG: 5 TABLET ORAL at 08:50

## 2019-10-18 RX ADMIN — APIXABAN 5 MG: 5 TABLET, FILM COATED ORAL at 17:06

## 2019-10-18 RX ADMIN — LAMOTRIGINE 200 MG: 100 TABLET ORAL at 08:51

## 2019-10-18 RX ADMIN — LIDOCAINE 1 PATCH: 50 PATCH TOPICAL at 08:48

## 2019-10-18 RX ADMIN — ONDANSETRON 4 MG: 2 INJECTION INTRAMUSCULAR; INTRAVENOUS at 05:21

## 2019-10-18 RX ADMIN — FERROUS SULFATE TAB 325 MG (65 MG ELEMENTAL FE) 325 MG: 325 (65 FE) TAB at 08:50

## 2019-10-18 RX ADMIN — APIXABAN 5 MG: 5 TABLET, FILM COATED ORAL at 08:50

## 2019-10-18 RX ADMIN — ACETAMINOPHEN 488 MG: 325 TABLET ORAL at 19:57

## 2019-10-18 RX ADMIN — GABAPENTIN 600 MG: 300 CAPSULE ORAL at 17:06

## 2019-10-18 RX ADMIN — LORAZEPAM 2 MG: 1 TABLET ORAL at 17:04

## 2019-10-18 RX ADMIN — ZONISAMIDE 200 MG: 100 CAPSULE ORAL at 08:53

## 2019-10-18 RX ADMIN — GABAPENTIN 600 MG: 300 CAPSULE ORAL at 21:45

## 2019-10-18 RX ADMIN — GABAPENTIN 600 MG: 300 CAPSULE ORAL at 08:50

## 2019-10-18 RX ADMIN — ONDANSETRON 4 MG: 2 INJECTION INTRAMUSCULAR; INTRAVENOUS at 12:13

## 2019-10-18 NOTE — PLAN OF CARE
Problem: Potential for Falls  Goal: Patient will remain free of falls  Description  INTERVENTIONS:  - Assess patient frequently for physical needs  -  Identify cognitive and physical deficits and behaviors that affect risk of falls    -  [place] fall precautions as indicated by assessment   - Educate patient/family on patient safety including physical limitations  - Instruct patient to call for assistance with activity based on assessment  - Modify environment to reduce risk of injury  - Consider OT/PT consult to assist with strengthening/mobility  Outcome: Progressing     Problem: PAIN - ADULT  Goal: Verbalizes/displays adequate comfort level or baseline comfort level  Description  Interventions:  - Encourage patient to monitor pain and request assistance  - Assess pain using appropriate pain scale  - Administer analgesics based on type and severity of pain and evaluate response  - Implement non-pharmacological measures as appropriate and evaluate response  - Consider cultural and social influences on pain and pain management  - Notify physician/advanced practitioner if interventions unsuccessful or patient reports new pain  Outcome: Progressing     Problem: INFECTION - ADULT  Goal: Absence or prevention of progression during hospitalization  Description  INTERVENTIONS:  - Assess and monitor for signs and symptoms of infection  - Monitor lab/diagnostic results  - Monitor all insertion sites, i e  indwelling lines, tubes, and drains  - Monitor endotracheal if appropriate and nasal secretions for changes in amount and color  - [place] appropriate cooling/warming therapies per order  - Administer medications as ordered  - Instruct and encourage patient and family to use good hand hygiene technique  - Identify and instruct in appropriate isolation precautions for identified infection/condition  Outcome: Progressing  Goal: Absence of fever/infection during neutropenic period  Description  INTERVENTIONS:  - Monitor WBC    Outcome: Progressing     Problem: SAFETY ADULT  Goal: Patient will remain free of falls  Description  INTERVENTIONS:  - Assess patient frequently for physical needs  -  Identify cognitive and physical deficits and behaviors that affect risk of falls    -  [place] fall precautions as indicated by assessment   - Educate patient/family on patient safety including physical limitations  - Instruct patient to call for assistance with activity based on assessment  - Modify environment to reduce risk of injury  - Consider OT/PT consult to assist with strengthening/mobility  Outcome: Progressing  Goal: Maintain or return to baseline ADL function  Description  INTERVENTIONS:  -  Assess patient's ability to carry out ADLs; assess patient's baseline for ADL function and identify physical deficits which impact ability to perform ADLs (bathing, care of mouth/teeth, toileting, grooming, dressing, etc )  - Assess/evaluate cause of self-care deficits   - Assess range of motion  - Assess patient's mobility; develop plan if impaired  - Assess patient's need for assistive devices and provide as appropriate  - Encourage maximum independence but intervene and supervise when necessary  - Involve family in performance of ADLs  - Assess for home care needs following discharge   - Consider OT consult to assist with ADL evaluation and planning for discharge  - Provide patient education as appropriate  Outcome: Progressing  Goal: Maintain or return mobility status to optimal level  Description  INTERVENTIONS:  - Assess patient's baseline mobility status (ambulation, transfers, stairs, etc )    - Identify cognitive and physical deficits and behaviors that affect mobility  - Identify mobility aids required to assist with transfers and/or ambulation (gait belt, sit-to-stand, lift, walker, cane, etc )  - [place] fall precautions as indicated by assessment  - Record patient progress and toleration of activity level on Mobility SBAR; progress patient to next Phase/Stage  - Instruct patient to call for assistance with activity based on assessment  - Consider rehabilitation consult to assist with strengthening/weightbearing, etc   Outcome: Progressing     Problem: DISCHARGE PLANNING  Goal: Discharge to home or other facility with appropriate resources  Description  INTERVENTIONS:  - Identify barriers to discharge w/patient and caregiver  - Arrange for needed discharge resources and transportation as appropriate  - Identify discharge learning needs (meds, wound care, etc )  - Arrange for interpretive services to assist at discharge as needed  - Refer to Case Management Department for coordinating discharge planning if the patient needs post-hospital services based on physician/advanced practitioner order or complex needs related to functional status, cognitive ability, or social support system  Outcome: Progressing     Problem: Knowledge Deficit  Goal: Patient/family/caregiver demonstrates understanding of disease process, treatment plan, medications, and discharge instructions  Description  Complete learning assessment and assess knowledge base    Interventions:  - Provide teaching at level of understanding  - Provide teaching via preferred learning methods  Outcome: Progressing     Problem: Prexisting or High Potential for Compromised Skin Integrity  Goal: Skin integrity is maintained or improved  Description  INTERVENTIONS:  - Identify patients at risk for skin breakdown  - Assess and monitor skin integrity  - Assess and monitor nutrition and hydration status  - Monitor labs   - Assess for incontinence   - Turn and reposition patient  - Assist with mobility/ambulation  - Relieve pressure over bony prominences  - Avoid friction and shearing  - Provide appropriate hygiene as needed including keeping skin clean and dry  - Evaluate need for skin moisturizer/barrier cream  - Collaborate with interdisciplinary team   - Patient/family teaching  - Consider wound care consult   Outcome: Progressing

## 2019-10-18 NOTE — OCCUPATIONAL THERAPY NOTE
[address] Progress Note     Patient Name: Doc GONZALEZ Date: 10/18/2019  Problem List  Principal Problem:    Vertigo  Active Problems:    Bipolar depression ([facility] )    Fibromyalgia    Osteoarthritis of lumbosacral spine without myelopathy    Migraine    History of TIAs    PE (pulmonary thromboembolism) ([facility] )    Ambulatory dysfunction    Closed head injury with brief loss of consciousness ([place] [id number] )    Shoulder pain    Morbid obesity with BMI of 40 0-44 9, adult ([place] [id number] )            10/18/19 1429   Restrictions/Precautions   Weight Bearing Precautions Per Order No   Other Precautions Cognitive; Chair Alarm; Bed Alarm; Fall Risk;Pain   General   Response to Previous Treatment Patient with no complaints from previous session   Lifestyle   Autonomy At baseline, pt was I w/ ADLs and functional mobility/transfers w/ use of RW, required assist w/ IADLs, (+) , and (+) falls PTA   Reciprocal Relationships Spouse   Service to Others On disability   Intrinsic Gratification Watching TV   Pain Assessment   Pain Assessment 0-10   Pain Score 7   Pain Type Acute pain   Pain Location Shoulder   Pain Orientation Right   Hospital Pain Intervention(s) Ambulation/increased activity; Emotional support;Rest;Repositioned   Response to Interventions Tolerated OT   Multiple Pain Sites No   ADL   Where Assessed Chair   Grooming Assistance 5  Supervision/Setup   Grooming Deficit Setup;Supervision/safety; Increased time to complete   Grooming Comments Light grooming tasks completed with Supervision while standing at sink to wash/dry hands  UB Dressing Assistance 5  Supervision/Setup   UB Dressing Deficit Supervision/safety;Setup; Increased time to complete   UB Dressing Comments Setup required; Don/doff hospital [facility] and veee   LB Dressing Assistance 3  Moderate Assistance   LB Dressing Deficit Setup;Verbal cueing;Supervision/safety; Increased time to complete; Don/doff R sock; Don/doff L sock   LB Dressing Comments LB dressing completed with Mod A 2* impaired functional reach when donning/doffing B/L socks  Recommend trial with LH AE in future session  Toileting Assistance  4  Minimal Assistance   Toileting Deficit Setup;Steadying;Verbal cueing;Supervison/safety; Increased time to complete;Perineal hygiene   Toileting Comments Toileting tasks completed with Min A with CGA impaired dynamic standing balance demonstrated  Bed Mobility   Supine to Sit 4  Minimal assistance   Additional items Assist x 1;HOB elevated; Bedrails; Increased time required;Verbal cues;LE management   Sit to Supine Unable to assess   Additional Comments Pt seated OOB in chair with Alycia Summers PTA, present  All needs met and pt reports no further questions for OT at this time  Transfers   Sit to Stand 4  Minimal assistance   Additional items Assist x 1;Bedrails; Increased time required;Verbal cues   Stand to Sit 4  Minimal assistance   Additional items Assist x 1; Armrests; Increased time required;Verbal cues   Toilet transfer 4  Minimal assistance   Additional items Assist x 1; Increased time required;Verbal cues;Standard toilet  (grab bar use on R)   Additional Comments Cues for safe technique and hand placement   Functional Mobility   Functional Mobility 4  Minimal assistance   Additional Comments Assist x1 with 2nd person present for chair follow   Additional items Rolling walker   Toilet Transfers   Toilet Transfer From Rolling walker   Toilet Transfer Type To and from   Toilet Transfer to Standard toilet   Toilet Transfer Technique Ambulating   Toilet Transfers Minimal assistance;Verbal cues   Toilet Transfers Comments Assist x1 with grab bar use on R   Cognition   Overall Cognitive Status [facility]   Arousal/Participation Alert; Cooperative   Attention Attends with cues to redirect   Orientation Level Oriented X4   Memory Decreased recall of precautions   Following Commands Follows one step commands without difficulty   Activity Tolerance   Activity Tolerance Patient limited by pain; Patient limited by fatigue   Medical Staff Made Aware Maryjo Brasher RN; Ayah Norris PTA   Assessment   Assessment Pt seen for OT treatment session this PM focusing on functional activity tolerance, bed mobility, ADLs, and functional mobility/transfers  Pt alert and cooperative throughout session  Pt lying supine at start of session, requiring Min A for supine>sit with assist for trunk control  Transfers completed with Min A with cues for safe technique and hand placement  Min A required for functional mobility with CGA required for balance/steadying with 2nd person present for chair follow  Toileting tasks completed with Min A with CGA impaired dynamic standing balance demonstrated  Light grooming tasks completed with Supervision while standing at sink to wash/dry hands  Dressing tasks completed while seated OOB in chair  UB dressing completed with Supervision 2* setup required  LB dressing completed with Mod A 2* impaired functional reach when donning/doffing B/L socks  Recommend trial with LH AE in future session  Pt seated OOB in chair with JOSEPH Hussein, present  All needs met and pt reports no further questions for OT at this time  Continue to recommend STR upon D/C  OT to follow pt on caseload  Plan   Treatment Interventions ADL retraining;Functional transfer training;UE strengthening/ROM; Endurance training;Patient/family training;Equipment evaluation/education; Compensatory technique education; Energy conservation; Activityengagement   Goal Expiration Date 10/30/19   OT Treatment Day 1   OT Frequency 3-5x/wk   Recommendation   OT Discharge Recommendation Short Term Rehab   OT - OK to Discharge Yes  (when medically cleared to rehab)   Barthel Index   Feeding 10   Bathing 0   Grooming Score 5   Dressing Score 5   Bladder Score 10   Bowels Score 10   Toilet Use Score 5   Transfers (Bed/Chair) Score 10   Mobility (Level Surface) Score 0   Stairs Score 0   B[place] Index Score 55   Modified [place] Scale   Modified [place] Scale 4       Gabe Beaver, OTR/L

## 2019-10-18 NOTE — PHYSICAL THERAPY NOTE
Physical Therapy Progress Note     10/18/19 5230   Pain Assessment   Pain Assessment 0-10   Pain Score 7   Pain Type Acute pain   Pain Location Shoulder   Pain Orientation Right   Hospital Pain Intervention(s) Ambulation/increased activity;Repositioned   Response to Interventions Tolerated  Restrictions/Precautions   Weight Bearing Precautions Per Order No   Other Precautions Fall Risk;Cognitive; Chair Alarm; Bed Alarm;Pain   General   Chart Reviewed Yes   Response to Previous Treatment Patient reporting fatigue but able to participate  Family/Caregiver Present No   Subjective   Subjective Willing to participate in therapy this PM    Bed Mobility   Supine to Sit 4  Minimal assistance   Additional items Assist x 1;HOB elevated; Bedrails; Increased time required;Leg ;LE management;Verbal cues   Transfers   Sit to Stand 4  Minimal assistance   Additional items Assist x 1;Bedrails; Increased time required;Verbal cues;Armrests   Stand to Sit 4  Minimal assistance   Additional items Assist x 1; Armrests; Increased time required;Verbal cues   Toilet transfer 4  Minimal assistance   Additional items Assist x 1; Armrests; Increased time required;Verbal cues;Standard toilet; Other  (use of grab bar)   Ambulation/Elevation   Gait pattern Decreased foot clearance; Forward Flexion; Short stride; Excessively slow; Inconsistent gil; Shuffling   Gait Assistance 4  Minimal assist   Additional items Assist x 1; Tactile cues; Verbal cues; Other (Comment)  (with second present for a close chair follow)   Assistive Device Rolling walker   Distance 15' x 2, with seated toileting break 55' x 2 with a seated resting period in between   Balance   Static Sitting Fair +   Dynamic Sitting Fair -   Static Standing Poor +   Dynamic Standing Poor +   Ambulatory Poor +   Endurance Deficit   Endurance Deficit Yes   Endurance Deficit Description weakness/fatigue/pain   Activity Tolerance   Activity Tolerance Patient limited by fatigue;Patient limited by pain   Medical Staff [address]    Nurse Made Aware Yes   Exercises   TKR Sitting;10 reps;AAROM; Bilateral   Assessment   Prognosis Good   Problem List Decreased strength;Decreased range of motion;Decreased endurance; Impaired balance;Decreased mobility; Decreased safety awareness;Decreased cognition; Impaired judgement;Decreased skin integrity;Obesity;Pain   Assessment Pt  supine in bed upon my arrival  Pt  reporting increased fatigue/pain, however agreeable therapeutic intervention  Performance of HEP supine/seated with cues provided for proper completion  Progressed with transfers continuing to require A of therapist with cues for hand placement/technique  Pt  requested to use br, female OTR present for A with pericare  Pt  continued with an increased amb  trial with use of RW and A of therapist with cues provided for LE sequencing and a close chair follow  Required a seated resting period in between gait trials due to fatigue  Pt  returned to room and repositioned seated in bedside chair with alarm active at end of treatment session  PT will continue to recommend d/c to rehab when medically stable for continued improvement of noted impairments above  Barriers to Discharge Inaccessible home environment;Decreased caregiver support   Barriers to Discharge Comments BECCA   Goals   Patient Goals To get better  STG Expiration Date 10/30/19   PT Treatment Day 1   Plan   Treatment/Interventions Functional transfer training;LE strengthening/ROM; Therapeutic exercise; Endurance training;Gait training;Bed mobility;Spoke to nursing;Spoke to case management;OT   Progress Progressing toward goals   PT Frequency Other (Comment)  (3-5x/wk)   Recommendation   Recommendation Short-term skilled PT   Equipment Recommended Walker  (RW)   PT - OK to Discharge Yes  (if d/c to rehab when medically stable )     Fransico Patel, PTA

## 2019-10-18 NOTE — ASSESSMENT & PLAN NOTE
Continue current regimen  No active migraine    Previous admission beginning of October, followed by [facility]  10/02- 10/07 - Previous admission   She was given three day course of valproic acid and magnesium

## 2019-10-18 NOTE — PLAN OF CARE
Problem: OCCUPATIONAL THERAPY ADULT  Goal: Performs self-care activities at highest level of function for planned discharge setting  See evaluation for individualized goals  Description  Treatment Interventions: ADL retraining, Functional transfer training, UE strengthening/ROM, Endurance training, Patient/family training, Equipment evaluation/education, Compensatory technique education, Energy conservation, Activityengagement          See flowsheet documentation for full assessment, interventions and recommendations  Outcome: Progressing  Note:   Limitation: Decreased ADL status, Decreased UE strength, Decreased Safe judgement during ADL, Decreased endurance, Decreased self-care trans, Decreased high-level ADLs  Prognosis: Good  Assessment: Pt seen for OT treatment session this PM focusing on functional activity tolerance, bed mobility, ADLs, and functional mobility/transfers  Pt alert and cooperative throughout session  Pt lying supine at start of session, requiring Min A for supine>sit with assist for trunk control  Transfers completed with Min A with cues for safe technique and hand placement  Min A required for functional mobility with CGA required for balance/steadying with 2nd person present for chair follow  Toileting tasks completed with Min A with CGA impaired dynamic standing balance demonstrated  Light grooming tasks completed with Supervision while standing at sink to wash/dry hands  Dressing tasks completed while seated OOB in chair  UB dressing completed with Supervision 2* setup required  LB dressing completed with Mod A 2* impaired functional reach when donning/doffing B/L socks  Recommend trial with LH AE in future session  Pt seated OOB in chair with JOSEPH Hussein, present  All needs met and pt reports no further questions for OT at this time  Continue to recommend STR upon D/C  OT to follow pt on caseload        OT Discharge Recommendation: Short Term Rehab  OT - OK to Discharge: Yes(when medically cleared to rehab)

## 2019-10-18 NOTE — PROGRESS NOTES
Progress Note - Belem Osei 1960, 59 y o  female MRN: [id number]    Unit/Bed#: E5 -01 Encounter: [id number]    Primary Care Provider: Kaushik Jeff DO   Date and time admitted to hospital: 10/15/2019  2:43 PM      The patient was changed from observation to inpatient secondary to medication titration of psychiatric medications as well as physical therapy and occupational therapy requiring an additional 2 midnights for treatment and management  For the past family and social history and review of systems please see observation H&P which was dated 10/16/2019, and is located in the patient's chart  I have reviewed the information and there have been no changes  Morbid obesity with BMI of 40 0-44 9, adult ([facility] )  Assessment & Plan  Continue healthy eating living and lifestyle modification    Closed head injury with brief loss of consciousness ([facility] [address] )  Assessment & Plan  No new changes over 72 hours  Continue monitoring    Ambulatory dysfunction  Assessment & Plan  Patient reports weakness - ongoing  PT/OT/CM consultation placed- Awaiting discharge  No evidence of an acute pathology for the patient's ambulatory dysfunction  PE (pulmonary thromboembolism) ([facility] [address] )  Assessment & Plan  On Eliquis for history of PE/DVT   Resume current dosing for now    History of TIAs  Assessment & Plan  MRI of the Brain w/o evidence of stroke  Neurochecks q shift    Migraine  Assessment & Plan  Continue current regimen  No active migraine    Previous admission beginning of October, followed by [facility]  10/02- 10/07 - Previous admission   She was given three day course of valproic acid and magnesium        Fibromyalgia  Assessment & Plan  Recommend gated exercise program as an outpatient  Physical therapy and occupational therapy consultation reviewed with recommendation for short-term nursing facility placement  Does have chronic pain and particularly in the right shoulder for which no fracture is noted- improved    Bipolar depression [facility])  Assessment & Plan  Psychiatric consultation placed,  And medication adjustments noted  The patient has no homicidal or suicidal ideation    * Vertigo  Assessment & Plan  Vertigo felt to be medication induced  The patient's Abilify was reduced to 7 5 mg, in addition her Luvox was reduced to 150 mg  She no longer reports having dizziness at this time  MRI of the brain was negative for acute infarct and Neurology did sign off  Improving, continue PT/OT evals          VTE Pharmacologic Prophylaxis:   Pharmacologic: Apixaban (Eliquis)  Mechanical VTE Prophylaxis in Place: Yes    Patient Centered Rounds: I have performed bedside rounds with nursing staff today  Discussions with Specialists or Other Care Team Provider:  Case management    Education and Discussions with Family / Patient:  Patient    Time Spent for Care: 30 minutes  More than 50% of total time spent on counseling and coordination of care as described above  Current Length of Stay: 1 day(s)    Current Patient Status: Inpatient   Certification Statement: The patient will continue to require additional inpatient hospital stay due to Discharge planning and continue physical therapy and occupational therapy management    Discharge Plan:  Short-term nursing facility    Code Status: Level 1 - Full Code      Subjective:   Patient seen and examined, dizziness improved  No chest pain shortness of breath difficulty breathing  No episodes of emesis    A complete and comprehensive 14 point organ system review has been performed and all other systems are negative other than stated above  Objective:     Vitals:   Temp (24hrs), Av 3 °F (36 3 °C), Min:96 8 °F (36 °C), Max:98 2 °F (36 8 °C)    Temp:  [96 8 °F (36 °C)-98 2 °F (36 8 °C)] 98 2 °F (36 8 °C)  HR:  [63-74] 70  Resp:  [16-20] 16  BP: (123-147)/(58-84) 123/58  SpO2:  [93 %-96 %] 93 %  Body mass index is 40 9 kg/m²       Input and Output Summary (last  hours): Intake/Output Summary (Last 24 hours) at 10/18/2019 1700  Last data filed at 10/18/2019 1300  Gross per 24 hour   Intake 1120 ml   Output    Net 1120 ml       Physical Exam:       General: well appearing, no acute distress  HEENT: atraumatic, PERRLA, moist mucosa, normal pharynx, normal tonsils and adenoids, normal tongue, no fluid in sinuses  Neck: Trachea midline, no carotid bruit, no masses  Respiratory: normal chest wall expansion, CTA B, no r/r/w, no rubs  Cardiovascular: RRR, no m/r/g, Normal S1 and S2  Abdomen: Soft, non-tender, non-distended, normal bowel sounds in all quadrants, no hepatosplenomegaly, no tympany  Rectal: deferred  Musculoskeletal: normal ROM in upper and lower extremities  Integumentary: warm, dry, and pink, with no rash, purpura, or petechia  Heme/Lymph: no lymphadenopathy, no bruises  Neurological: Cranial Nerves II-XII grossly intact, no tics, normal sensation to pressure and light touch  Psychiatric: cooperative with normal mood, affect, and cognition      Additional Data:     Labs:    Results from last 7 days   Lab Units 10/16/19  0448 10/15/19  1800   WBC Thousand/uL 7 04 10 21*   HEMOGLOBIN g/dL 11 6 12 2   HEMATOCRIT % 37 3 37 4   PLATELETS Thousands/uL 308 309   NEUTROS PCT %  --  74   LYMPHS PCT %  --  16   MONOS PCT %  --  10   EOS PCT %  --  0     Results from last 7 days   Lab Units 10/16/19  0448   SODIUM mmol/L 143   POTASSIUM mmol/L 4 2   CHLORIDE mmol/L 112*   CO2 mmol/L 24   BUN mg/dL 11   CREATININE mg/dL 0 65   ANION GAP mmol/L 7   CALCIUM mg/dL 8 6   GLUCOSE RANDOM mg/dL 91                           * I Have Reviewed All Lab Data Listed Above  * Additional Pertinent Lab Tests Reviewed:  [address] Admission Reviewed    Imaging:    No new imaging    Recent Cultures (last 7 days):           Last 24 Hours Medication List:     Current Facility-Administered Medications:  acetaminophen 488 mg Oral Q6H PRN Thanh Davidson DO   apixaban 5 mg Oral BID Young Brett, DO   ARIPiprazole 7 5 mg Oral Daily MOON Vásquez   cholecalciferol 5,000 Units Oral Daily Thanh HOAWRD Davidson, DO   docusate sodium 100 mg Oral BID Thanh HOWARD Davidson, DO   ferrous sulfate 325 mg Oral Daily With Breakfast Thanh HOWARD Davidson, DO   fluvoxaMINE 150 mg Oral HS Alisson Cameron, MOON   gabapentin 600 mg Oral TID Thanh Davidson, DO   lamoTRIgine 200 mg Oral Daily Thanh HOWARD Davidson, DO   lidocaine 1 patch Topical Daily Thanh HOWARD Davidson, DO   LORazepam 2 mg Oral TID PRN Elmer West, DO   meclizine 12 5 mg Oral Q8H PRN Thanh Davidson, DO   ondansetron 4 mg Intravenous Q4H PRN Thanh Davidson, DO   traMADol 50 mg Oral Q6H PRN Edwige Grissom PA-C   zonisamide 200 mg Oral Daily Elmer West, DO        Today, Patient Was Seen By: Yogesh Reilly DO    ** Please Note: Dictation voice to text software may have been used in the creation of this document   **

## 2019-10-18 NOTE — ASSESSMENT & PLAN NOTE
Recommend gated exercise program as an outpatient  Physical therapy and occupational therapy consultation reviewed with recommendation for short-term nursing facility placement  Does have chronic pain and particularly in the right shoulder for which no fracture is noted- improved

## 2019-10-18 NOTE — ASSESSMENT & PLAN NOTE
Patient reports weakness - ongoing  PT/OT/CM consultation placed- Awaiting discharge  No evidence of an acute pathology for the patient's ambulatory dysfunction

## 2019-10-18 NOTE — PLAN OF CARE
Problem: PHYSICAL THERAPY ADULT  Goal: Performs mobility at highest level of function for planned discharge setting  See evaluation for individualized goals  Description  Treatment/Interventions: LE strengthening/ROM, Functional transfer training, Elevations, Therapeutic exercise, Endurance training, Patient/family training, Bed mobility, Gait training, Spoke to nursing, OT, Family  Equipment Recommended: Walker(RW)       See flowsheet documentation for full assessment, interventions and recommendations  Outcome: Progressing  Note:   Prognosis: Good  Problem List: Decreased strength, Decreased range of motion, Decreased endurance, Impaired balance, Decreased mobility, Decreased safety awareness, Decreased cognition, Impaired judgement, Decreased skin integrity, Obesity, Pain  Assessment: Pt  supine in bed upon my arrival  Pt  reporting increased fatigue/pain, however agreeable therapeutic intervention  Performance of HEP supine/seated with cues provided for proper completion  Progressed with transfers continuing to require A of therapist with cues for hand placement/technique  Pt  requested to use br, female OTR present for A with pericare  Pt  continued with an increased amb  trial with use of RW and A of therapist with cues provided for LE sequencing and a close chair follow  Required a seated resting period in between gait trials due to fatigue  Pt  returned to room and repositioned seated in bedside chair with alarm active at end of treatment session  PT will continue to recommend d/c to rehab when medically stable for continued improvement of noted impairments above  Barriers to Discharge: Inaccessible home environment, Decreased caregiver support  Barriers to Discharge Comments: BECCA  Recommendation: Short-term skilled PT     PT - OK to Discharge: Yes(if d/c to rehab when medically stable )    See flowsheet documentation for full assessment

## 2019-10-18 NOTE — ASSESSMENT & PLAN NOTE
Psychiatric consultation placed,  And medication adjustments noted     The patient has no homicidal or suicidal ideation

## 2019-10-18 NOTE — ASSESSMENT & PLAN NOTE
Vertigo felt to be medication induced  The patient's Abilify was reduced to 7 5 mg, in addition her Luvox was reduced to 150 mg  She no longer reports having dizziness at this time  MRI of the brain was negative for acute infarct and Neurology did sign off    Improving, continue PT/OT evals

## 2019-10-18 NOTE — CASE MANAGEMENT
CM called [employer] ([phone]) to follow up on the auth status  CM left a message and is waiting on a reply

## 2019-10-19 PROCEDURE — 99232 SBSQ HOSP IP/OBS MODERATE 35: CPT | Performed by: INTERNAL MEDICINE

## 2019-10-19 RX ADMIN — DOCUSATE SODIUM 100 MG: 100 CAPSULE, LIQUID FILLED ORAL at 17:56

## 2019-10-19 RX ADMIN — ACETAMINOPHEN 488 MG: 325 TABLET ORAL at 04:07

## 2019-10-19 RX ADMIN — APIXABAN 5 MG: 5 TABLET, FILM COATED ORAL at 17:56

## 2019-10-19 RX ADMIN — DOCUSATE SODIUM 100 MG: 100 CAPSULE, LIQUID FILLED ORAL at 09:55

## 2019-10-19 RX ADMIN — ARIPIPRAZOLE 7.5 MG: 5 TABLET ORAL at 09:52

## 2019-10-19 RX ADMIN — GABAPENTIN 600 MG: 300 CAPSULE ORAL at 17:56

## 2019-10-19 RX ADMIN — FLUVOXAMINE MALEATE 150 MG: 50 TABLET, FILM COATED ORAL at 21:19

## 2019-10-19 RX ADMIN — ZONISAMIDE 200 MG: 100 CAPSULE ORAL at 10:02

## 2019-10-19 RX ADMIN — FERROUS SULFATE TAB 325 MG (65 MG ELEMENTAL FE) 325 MG: 325 (65 FE) TAB at 09:55

## 2019-10-19 RX ADMIN — LORAZEPAM 2 MG: 1 TABLET ORAL at 09:55

## 2019-10-19 RX ADMIN — ACETAMINOPHEN 488 MG: 325 TABLET ORAL at 19:48

## 2019-10-19 RX ADMIN — MELATONIN 5000 UNITS: at 09:52

## 2019-10-19 RX ADMIN — ONDANSETRON 4 MG: 2 INJECTION INTRAMUSCULAR; INTRAVENOUS at 09:51

## 2019-10-19 RX ADMIN — LIDOCAINE 1 PATCH: 50 PATCH TOPICAL at 09:52

## 2019-10-19 RX ADMIN — GABAPENTIN 600 MG: 300 CAPSULE ORAL at 21:19

## 2019-10-19 RX ADMIN — GABAPENTIN 600 MG: 300 CAPSULE ORAL at 09:52

## 2019-10-19 RX ADMIN — ONDANSETRON 4 MG: 2 INJECTION INTRAMUSCULAR; INTRAVENOUS at 14:37

## 2019-10-19 RX ADMIN — APIXABAN 5 MG: 5 TABLET, FILM COATED ORAL at 09:55

## 2019-10-19 RX ADMIN — LORAZEPAM 2 MG: 1 TABLET ORAL at 17:56

## 2019-10-19 RX ADMIN — LAMOTRIGINE 200 MG: 100 TABLET ORAL at 09:55

## 2019-10-19 RX ADMIN — ONDANSETRON 4 MG: 2 INJECTION INTRAMUSCULAR; INTRAVENOUS at 04:16

## 2019-10-19 RX ADMIN — ONDANSETRON 4 MG: 2 INJECTION INTRAMUSCULAR; INTRAVENOUS at 19:39

## 2019-10-19 NOTE — UTILIZATION REVIEW
Continued Stay Review    Date:   10/19/19                 Current Patient Class: INPATIENT  Current Level of Care: MED-SURG     HPI:58 y o  female initially admitted on INPATIENT [email or web address] CHANGED TO [email or web address] AND UPGRADED TO INPATIENT [email or web address] D/T NEED FOR PSYCHIATRIC MEDICATION TITRATION AND PT/OT  Admitted On 10/15 observation d/t fall with closed head injury with brief LOC with vertigo and ambulatory dysfunction  Hx of morbid obesity, PE, migraine, bipolar depression  CT head negative  MRI of the brain was negative for acute infarct   Eliquis,  valproic acid and magnesium  PT/OT/CM and neurology consult  10/18/19 1657  Inpatient Admission Once     Transfer Service: Hospitalist    Expected Discharge Time: Morning    Expected Discharge Date: 10/21/19       Question Answer Comment   Admitting Physician Chloe LAWRENCE    Level of Care Med Surg    Bed Type [place]    Estimated length of stay More than 2 Midnights    Certification I certify that inpatient services are medically necessary for this patient for a duration of greater than two midnights  See H&P and MD Progress Notes for additional information about the patient's course of treatment          10/18/19 1656         Pertinent Labs/Diagnostic Results:   Results from last 7 days   Lab Units 10/16/19  0448 10/15/19  1800   WBC Thousand/uL 7 04 10 21*   HEMOGLOBIN g/dL 11 6 12 2   HEMATOCRIT % 37 3 37 4   PLATELETS Thousands/uL 308 309   NEUTROS ABS Thousands/µL  --  7 50         Results from last 7 days   Lab Units 10/16/19  0448 10/15/19  1800   SODIUM mmol/L 143 146*   POTASSIUM mmol/L 4 2 3 8   CHLORIDE mmol/L 112* 110*   CO2 mmol/L 24 29   ANION GAP mmol/L 7 7   BUN mg/dL 11 10   CREATININE mg/dL 0 65 0 73   EGFR ml/min/1 73sq m 97 90   CALCIUM mg/dL 8 6 9 1       Results from last 7 days   Lab Units 10/16/19  0448 10/15/19  1800   GLUCOSE RANDOM mg/dL 91 98       Results from last 7 days   Lab Units 10/16/19  0209 10/15/19  2208 10/15/19  1857   TROPONIN I ng/mL <0 02 <0 02 <0 02       Results from last 7 days   Lab Units 10/15/19  1859   CLARITY UA  Clear   COLOR UA  Yellow   SPEC GRAV UA  1 015   PH UA  7 5   GLUCOSE UA mg/dl Negative   KETONES UA mg/dl Negative   BLOOD UA  Negative   PROTEIN UA mg/dl Negative   NITRITE UA  Negative   BILIRUBIN UA  Negative   UROBILINOGEN UA E U /dl 0 2   LEUKOCYTES UA  Trace*   WBC UA /hpf 0-1*   RBC UA /hpf 0-1*   BACTERIA UA /hpf Occasional   EPITHELIAL CELLS WET PREP /hpf Occasional     CXR  (  10/15)  NAD    Ct  Head    (  10/15)    No acute  Intracranial abnormality    Ct spine cervical (10/15): No cervical spine fracture or traumatic malalignment  MRI Brain (10/16):1  Grossly unremarkable study; however, cannot entirely exclude tiny IAC or inner ear lesions given lack of IV contrast      2   Scattered periventricular and subcortical white matter T2/FLAIR hyperintense foci, which are nonspecific, but most consistent with mild chronic microangiopathy    XRAY R shoulder (10/16): No acute osseous abnormality      EKG 10/15:  Normal sinus rhythm  Moderate voltage criteria for LVH, may be normal variant  Borderline ECG  When compared with ECG of 02-OCT-2019 14:38,  QT has shortened    Vital Signs:   10/19/19 0758  98 °F (36 7 °C)  64  20  91/53  93 %  None (Room air)  Lying   10/18/19 2300  97 8 °F (36 6 °C)  68  16  129/60  92 %  None (Room air)  Lying   10/18/19 1523  98 2 °F (36 8 °C)  70  16  123/58  93 %  None (Room air)  Sitting   10/18/19 0740  96 9 °F (36 1 °C)Abnormal   74  20  147/84  96 %  None (Room air)  Sitting   10/17/19 2233  96 8 °F (36 °C)Abnormal   63  18  131/78  95 %  None (Room air)  Lying   10/17/19 1515  96 2 °F (35 7 °C)Abnormal   78  20  146/72  93 %  None (Room air)  Sitting   10/17/19 0700  97 4 °F (36 3 °C)Abnormal   66  20  118/66  95 %  None (Room air)  Sitting         Medications:     Medications:  apixaban 5 mg Oral BID   ARIPiprazole 7 5 mg Oral Daily   cholecalciferol 5,000 Units Oral Daily   docusate sodium 100 mg Oral BID   ferrous sulfate 325 mg Oral Daily With Breakfast   fluvoxaMINE 150 mg Oral HS   gabapentin 600 mg Oral TID   lamoTRIgine 200 mg Oral Daily   lidocaine 1 patch Topical Daily   zonisamide 200 mg Oral Daily          acetaminophen 488 mg Oral Q6H PRN 10/16 x 2, 10/17 x 1, 10/18 x 2, 10/19 x 1   LORazepam 2 mg Oral TID PRN 10/16 x 2, 10/17 x 2, 10/18 x 2   meclizine 12 5 mg Oral Q8H PRN   ondansetron 4 mg Intravenous Q4H PRN 10/16 x 2, 10/17 x 3, 10/18 x 3, 10/19 x 1   traMADol 50 mg Oral Q6H PRN 10/16 x 1     Neuro checks qshift  Ambulate  Reg diet  Seq comp device  Daily weights  Ortho bp   Pt/ot    Discharge Plan: Short-term nursing facility    Network Utilization Review Department  Phone: [phone]; Fax [phone]  [email or web address]  ATTENTION: Please call with any questions or concerns to [phone]  and carefully listen to the prompts so that you are directed to the right person  Send all requests for admission clinical reviews, approved or denied determinations and any other requests to fax [phone]   All voicemails are confidential

## 2019-10-19 NOTE — PLAN OF CARE
Problem: Potential for Falls  Goal: Patient will remain free of falls  Description  INTERVENTIONS:  - Assess patient frequently for physical needs  -  Identify cognitive and physical deficits and behaviors that affect risk of falls    -  [place] fall precautions as indicated by assessment   - Educate patient/family on patient safety including physical limitations  - Instruct patient to call for assistance with activity based on assessment  - Modify environment to reduce risk of injury  - Consider OT/PT consult to assist with strengthening/mobility  Outcome: Adequate for Discharge     Problem: PAIN - ADULT  Goal: Verbalizes/displays adequate comfort level or baseline comfort level  Description  Interventions:  - Encourage patient to monitor pain and request assistance  - Assess pain using appropriate pain scale  - Administer analgesics based on type and severity of pain and evaluate response  - Implement non-pharmacological measures as appropriate and evaluate response  - Consider cultural and social influences on pain and pain management  - Notify physician/advanced practitioner if interventions unsuccessful or patient reports new pain  Outcome: Adequate for Discharge     Problem: INFECTION - ADULT  Goal: Absence or prevention of progression during hospitalization  Description  INTERVENTIONS:  - Assess and monitor for signs and symptoms of infection  - Monitor lab/diagnostic results  - Monitor all insertion sites, i e  indwelling lines, tubes, and drains  - Monitor endotracheal if appropriate and nasal secretions for changes in amount and color  - [place] appropriate cooling/warming therapies per order  - Administer medications as ordered  - Instruct and encourage patient and family to use good hand hygiene technique  - Identify and instruct in appropriate isolation precautions for identified infection/condition  Outcome: Adequate for Discharge  Goal: Absence of fever/infection during neutropenic period  Description  INTERVENTIONS:  - Monitor WBC    Outcome: Adequate for Discharge     Problem: SAFETY ADULT  Goal: Patient will remain free of falls  Description  INTERVENTIONS:  - Assess patient frequently for physical needs  -  Identify cognitive and physical deficits and behaviors that affect risk of falls    -  [place] fall precautions as indicated by assessment   - Educate patient/family on patient safety including physical limitations  - Instruct patient to call for assistance with activity based on assessment  - Modify environment to reduce risk of injury  - Consider OT/PT consult to assist with strengthening/mobility  Outcome: Adequate for Discharge  Goal: Maintain or return to baseline ADL function  Description  INTERVENTIONS:  -  Assess patient's ability to carry out ADLs; assess patient's baseline for ADL function and identify physical deficits which impact ability to perform ADLs (bathing, care of mouth/teeth, toileting, grooming, dressing, etc )  - Assess/evaluate cause of self-care deficits   - Assess range of motion  - Assess patient's mobility; develop plan if impaired  - Assess patient's need for assistive devices and provide as appropriate  - Encourage maximum independence but intervene and supervise when necessary  - Involve family in performance of ADLs  - Assess for home care needs following discharge   - Consider OT consult to assist with ADL evaluation and planning for discharge  - Provide patient education as appropriate  Outcome: Adequate for Discharge  Goal: Maintain or return mobility status to optimal level  Description  INTERVENTIONS:  - Assess patient's baseline mobility status (ambulation, transfers, stairs, etc )    - Identify cognitive and physical deficits and behaviors that affect mobility  - Identify mobility aids required to assist with transfers and/or ambulation (gait belt, sit-to-stand, lift, walker, cane, etc )  - [place] fall precautions as indicated by assessment  - Record patient progress and toleration of activity level on Mobility SBAR; progress patient to next Phase/Stage  - Instruct patient to call for assistance with activity based on assessment  - Consider rehabilitation consult to assist with strengthening/weightbearing, etc   Outcome: Adequate for Discharge     Problem: DISCHARGE PLANNING  Goal: Discharge to home or other facility with appropriate resources  Description  INTERVENTIONS:  - Identify barriers to discharge w/patient and caregiver  - Arrange for needed discharge resources and transportation as appropriate  - Identify discharge learning needs (meds, wound care, etc )  - Arrange for interpretive services to assist at discharge as needed  - Refer to Case Management Department for coordinating discharge planning if the patient needs post-hospital services based on physician/advanced practitioner order or complex needs related to functional status, cognitive ability, or social support system  Outcome: Adequate for Discharge     Problem: Knowledge Deficit  Goal: Patient/family/caregiver demonstrates understanding of disease process, treatment plan, medications, and discharge instructions  Description  Complete learning assessment and assess knowledge base    Interventions:  - Provide teaching at level of understanding  - Provide teaching via preferred learning methods  Outcome: Adequate for Discharge     Problem: Prexisting or High Potential for Compromised Skin Integrity  Goal: Skin integrity is maintained or improved  Description  INTERVENTIONS:  - Identify patients at risk for skin breakdown  - Assess and monitor skin integrity  - Assess and monitor nutrition and hydration status  - Monitor labs   - Assess for incontinence   - Turn and reposition patient  - Assist with mobility/ambulation  - Relieve pressure over bony prominences  - Avoid friction and shearing  - Provide appropriate hygiene as needed including keeping skin clean and dry  - Evaluate need for skin moisturizer/barrier cream  - Collaborate with interdisciplinary team   - Patient/family teaching  - Consider wound care consult   Outcome: Adequate for Discharge

## 2019-10-19 NOTE — ASSESSMENT & PLAN NOTE
Recommend gated exercise program as an outpatient  Physical therapy and occupational therapy consultation reviewed with recommendation for short-term nursing facility placement  Does have chronic pain and particularly in the right shoulder for which no fracture is noted- improved    Does have back pain and right hip pain which are all improving with getting physical therapy performed in the hospital     Did discuss with patient additional therapy evaluations, likely to be discharged on 10/21/2019 home

## 2019-10-19 NOTE — PROGRESS NOTES
Progress Note - Norah Tierney 1960, 59 y o  female MRN: [id number]    Unit/Bed#: E5 -01 Encounter: [id number]    Primary Care Provider: Fortunato Scruggs DO   Date and time admitted to hospital: 10/15/2019  2:43 PM        Migraine  Assessment & Plan  Continue current regimen  No active migraine  Previous admission beginning of October, followed by [facility] Neurology  10/02- 10/07 - Previous admission   She was given three day course of valproic acid and magnesium  Haldol PRN held as likely causing sedation, would not resume at discharge  Osteoarthritis of lumbosacral spine without myelopathy  Assessment & Plan  Known OA  PT/OT mi      Fibromyalgia  Assessment & Plan  Recommend gated exercise program as an outpatient  Physical therapy and occupational therapy consultation reviewed with recommendation for short-term nursing facility placement  Does have chronic pain and particularly in the right shoulder for which no fracture is noted- improved    Does have back pain and right hip pain which are all improving with getting physical therapy performed in the hospital     Did discuss with patient additional therapy evaluations, likely to be discharged on 10/21/2019 home    Bipolar depression [facility])  Assessment & Plan  Psychiatric consultation placed,  And medication adjustments noted  The patient has no homicidal or suicidal ideation    Continue current regimen    * Vertigo  Assessment & Plan  Vertigo felt to be medication induced  The patient's Abilify was reduced to 7 5 mg, in addition her Luvox was reduced to 150 mg  She no longer reports having dizziness at this time  She does report that her confusion which has been present for quite some time is now improving as well  Will continue to monitor for any adverse side effects of her drug regimen  MRI of the brain was negative for acute infarct and Neurology did sign off    Improving, continue PT/OT evals          [facility]'s Internal Medicine Progress Note  Patient: Alpa Gomez 59 y o  female   MRN: [id number]  PCP: Jase Hillman DO  Unit/Bed#: E5 -01 Encounter: [id number]  Date Of Visit: 10/19/19    Assessment:    Principal Problem:    Vertigo  Active Problems:    Bipolar depression ([facility] )    Fibromyalgia    Osteoarthritis of lumbosacral spine without myelopathy    Migraine    History of TIAs    PE (pulmonary thromboembolism) ([place] [address] )    Ambulatory dysfunction    Closed head injury with brief loss of consciousness ([place] [address] )    Shoulder pain    Morbid obesity with BMI of 40 0-44 9, adult ([place] [address] )      Plan:    · Patient will continue physical therapy and occupational therapy while in the hospital    Did discuss short-term nursing facility placement, the patient likely will be well enough that she can go home  She prefers this as she has upcoming cataract eye surgery           VTE Pharmacologic Prophylaxis:   Pharmacologic: Apixaban (Eliquis)  Mechanical VTE Prophylaxis in Place: Yes    Patient Centered Rounds: I have performed bedside rounds with nursing staff today  Discussions with Specialists or Other Care Team Provider:  Reviewed previous psychiatry note    Education and Discussions with Family / Patient:  Patient    Time Spent for Care: 20 minutes  More than 50% of total time spent on counseling and coordination of care as described above  Current Length of Stay: 2 day(s)    Current Patient Status: Inpatient   Certification Statement: The patient will continue to require additional inpatient hospital stay due to Medication titration, physical therapy occupational therapy    Discharge Plan / Estimated Discharge Date:  48 hours    Code Status: Level 1 - Full Code      Subjective:   Patient seen and examined, reports improved clarity and less sedation with medication adjustment  She did report intermittent confusion prior to arrival which is now mostly improved without any other additional intervention      She does report right hip pain which is unchanged    A complete and comprehensive 14 point organ system review has been performed and all other systems are negative other than stated above  Objective:     Vitals:   Temp (24hrs), Av °F (36 7 °C), Min:97 8 °F (36 6 °C), Max:98 2 °F (36 8 °C)    Temp:  [97 8 °F (36 6 °C)-98 2 °F (36 8 °C)] 98 °F (36 7 °C)  HR:  [64-70] 64  Resp:  [16-20] 20  BP: ()/(53-60) 91/53  SpO2:  [92 %-93 %] 93 %  Body mass index is 40 9 kg/m²  Input and Output Summary (last 24 hours):        Intake/Output Summary (Last 24 hours) at 10/19/2019 1453  Last data filed at 10/19/2019 0758  Gross per 24 hour   Intake 240 ml   Output    Net 240 ml       Physical Exam:     General: well appearing, no acute distress  HEENT: atraumatic, PERRLA, moist mucosa, normal pharynx, normal tonsils and adenoids, normal tongue, no fluid in sinuses  Neck: Trachea midline, no carotid bruit, no masses  Respiratory: normal chest wall expansion, CTA B, no r/r/w, no rubs  Cardiovascular: RRR, no m/r/g, Normal S1 and S2  Abdomen: Soft, non-tender, non-distended, normal bowel sounds in all quadrants, no hepatosplenomegaly, no tympany  Rectal: deferred  Musculoskeletal: normal ROM in upper and lower extremities  Integumentary: warm, dry, and pink, with no rash, purpura, or petechia  Heme/Lymph: no lymphadenopathy, no bruises  Neurological: Cranial Nerves II-XII grossly intact, no tics, normal sensation to pressure and light touch  Psychiatric: cooperative with normal mood, affect, and cognition      Additional Data:     Labs:    Results from last 7 days   Lab Units 10/16/19  0448 10/15/19  1800   WBC Thousand/uL 7 04 10 21*   HEMOGLOBIN g/dL 11 6 12 2   HEMATOCRIT % 37 3 37 4   PLATELETS Thousands/uL 308 309   NEUTROS PCT %  --  74   LYMPHS PCT %  --  16   MONOS PCT %  --  10   EOS PCT %  --  0     Results from last 7 days   Lab Units 10/16/19  0448   POTASSIUM mmol/L 4 2   CHLORIDE mmol/L 112*   CO2 mmol/L 24   BUN mg/dL 11   CREATININE mg/dL 0  65   CALCIUM mg/dL 8 6           * I Have Reviewed All Lab Data Listed Above  * Additional Pertinent Lab Tests Reviewed: Neeraj Girard Admission Reviewed    Imaging:    No imaging in the last 24 hours    Recent Cultures (last 7 days):           Last 24 Hours Medication List:     Current Facility-Administered Medications:  acetaminophen 488 mg Oral Q6H PRN Thanh Davidson, DO   apixaban 5 mg Oral BID Thanh Davidson, DO   ARIPiprazole 7 5 mg Oral Daily MOON Flowers   cholecalciferol 5,000 Units Oral Daily Thanh Davidson, DO   docusate sodium 100 mg Oral BID Thanh Davidson, DO   ferrous sulfate 325 mg Oral Daily With Breakfast Thanh Davidson, DO   fluvoxaMINE 150 mg Oral HS MOON Calle   gabapentin 600 mg Oral TID Thanh Davidson, DO   lamoTRIgine 200 mg Oral Daily Thanh Davidson, DO   lidocaine 1 patch Topical Daily Thanh Davidson, DO   LORazepam 2 mg Oral TID PRN Theola Gist, DO   meclizine 12 5 mg Oral Q8H PRN Thanh Davidson, DO   ondansetron 4 mg Intravenous Q4H PRN Thanh Davidson, DO   traMADol 50 mg Oral Q6H PRN Edwige Grissom PA-C   zonisamide 200 mg Oral Daily Theola Gist, DO        Today, Patient Was Seen By: María White DO    ** Please Note: This note has been constructed using a voice recognition system   **

## 2019-10-19 NOTE — ASSESSMENT & PLAN NOTE
Continue current regimen  No active migraine  Previous admission beginning of October, followed by [facility] Neurology  10/02- 10/07 - Previous admission   She was given three day course of valproic acid and magnesium  Haldol PRN held as likely causing sedation, would not resume at discharge

## 2019-10-19 NOTE — ASSESSMENT & PLAN NOTE
Psychiatric consultation placed,  And medication adjustments noted     The patient has no homicidal or suicidal ideation    Continue current regimen

## 2019-10-19 NOTE — ASSESSMENT & PLAN NOTE
Vertigo felt to be medication induced  The patient's Abilify was reduced to 7 5 mg, in addition her Luvox was reduced to 150 mg  She no longer reports having dizziness at this time  She does report that her confusion which has been present for quite some time is now improving as well  Will continue to monitor for any adverse side effects of her drug regimen  MRI of the brain was negative for acute infarct and Neurology did sign off    Improving, continue PT/OT evals

## 2019-10-20 VITALS
RESPIRATION RATE: 20 BRPM | OXYGEN SATURATION: 95 % | BODY MASS INDEX: 40.86 KG/M2 | HEART RATE: 71 BPM | HEIGHT: 68 IN | WEIGHT: 269.62 LBS | DIASTOLIC BLOOD PRESSURE: 57 MMHG | TEMPERATURE: 97.5 F | SYSTOLIC BLOOD PRESSURE: 129 MMHG

## 2019-10-20 PROCEDURE — 99239 HOSP IP/OBS DSCHRG MGMT >30: CPT | Performed by: INTERNAL MEDICINE

## 2019-10-20 RX ORDER — FLUVOXAMINE MALEATE 50 MG/1
150 TABLET, COATED ORAL
Qty: 90 TABLET | Refills: 0 | Status: SHIPPED | OUTPATIENT
Start: 2019-10-20 | End: 2022-02-28 | Stop reason: ALTCHOICE

## 2019-10-20 RX ORDER — ARIPIPRAZOLE 15 MG/1
7.5 TABLET ORAL DAILY
Qty: 30 TABLET | Refills: 0 | Status: SHIPPED | OUTPATIENT
Start: 2019-10-21 | End: 2021-07-06 | Stop reason: HOSPADM

## 2019-10-20 RX ADMIN — MELATONIN 5000 UNITS: at 08:55

## 2019-10-20 RX ADMIN — LORAZEPAM 2 MG: 1 TABLET ORAL at 08:54

## 2019-10-20 RX ADMIN — ARIPIPRAZOLE 7.5 MG: 5 TABLET ORAL at 08:55

## 2019-10-20 RX ADMIN — LAMOTRIGINE 200 MG: 100 TABLET ORAL at 08:55

## 2019-10-20 RX ADMIN — LIDOCAINE 1 PATCH: 50 PATCH TOPICAL at 08:54

## 2019-10-20 RX ADMIN — ONDANSETRON 4 MG: 2 INJECTION INTRAMUSCULAR; INTRAVENOUS at 02:24

## 2019-10-20 RX ADMIN — ONDANSETRON 4 MG: 2 INJECTION INTRAMUSCULAR; INTRAVENOUS at 08:54

## 2019-10-20 RX ADMIN — APIXABAN 5 MG: 5 TABLET, FILM COATED ORAL at 08:55

## 2019-10-20 RX ADMIN — GABAPENTIN 600 MG: 300 CAPSULE ORAL at 08:55

## 2019-10-20 RX ADMIN — FERROUS SULFATE TAB 325 MG (65 MG ELEMENTAL FE) 325 MG: 325 (65 FE) TAB at 08:55

## 2019-10-20 RX ADMIN — ZONISAMIDE 200 MG: 100 CAPSULE ORAL at 08:55

## 2019-10-20 RX ADMIN — DOCUSATE SODIUM 100 MG: 100 CAPSULE, LIQUID FILLED ORAL at 08:55

## 2019-10-20 NOTE — SOCIAL WORK
LATE ENTRY 11/20/19 1749:  CM identified pt was discharged home with VNA services- unfortunately only SN/PT on face to face  [facility] notified of pt's change in DC Location and services ordered with the need to contact the physician for OT should it be needed

## 2019-10-20 NOTE — ASSESSMENT & PLAN NOTE
Continue current regimen, No active migraine  Previous admission beginning of October, followed by [facility]  10/02- 10/07 - Previous admission   She was given three day course of valproic acid and magnesium  Haldol PRN held as likely causing sedation, would not resume at discharge

## 2019-10-20 NOTE — DISCHARGE SUMMARY
Discharge- Norah Tierney 1960, 59 y o  female MRN: [id number]    Unit/Bed#: E5 -01 Encounter: [id number]    Primary Care Provider: Fortunato Scruggs DO   Date and time admitted to hospital: 10/15/2019  2:43 PM        Migraine  Assessment & Plan  Continue current regimen, No active migraine  Previous admission beginning of October, followed by [facility] Neurology  10/02- 10/07 - Previous admission   She was given three day course of valproic acid and magnesium  Haldol PRN held as likely causing sedation, would not resume at discharge  Osteoarthritis of lumbosacral spine without myelopathy  Assessment & Plan  Known OA, PT/OT evals      Fibromyalgia  Assessment & Plan  Recommend gated exercise program as an outpatient-     Bipolar depression [facility])  Assessment & Plan  Psychiatric consultation placed,  And medication adjustments noted  The patient has no homicidal or suicidal ideation  Outpatient follow up    * Vertigo  Assessment & Plan  Vertigo felt to be medication induced  The patient's Abilify was reduced to 7 5 mg, in addition her Luvox was reduced to 150 mg  She no longer reports having dizziness at this time  She does report that her confusion which has been present for quite some time is now improving as well  Will continue to monitor for any adverse side effects of her drug regimen  MRI of the brain was negative for acute infarct and Neurology did sign off  Improving, continue PT/OT evals            Admitting Provider:  Doron Encarnacion DO  Discharge Provider:  Guru Dela Cruz DO  Admission Date: 10/15/2019       Discharge Date: 10/20/19   LOS: 3  Primary Care Physician at Discharge: Fortunato Scruggs, [address] [place] [address] COURSE:  Norah Tierney is a 59 y o  female who presented  past medical history of bipolar disorder as well as history of DVT PE maintained on Eliquis who presented to the emergency room with chief complaint of vertigo    She does have a history of migraine headaches, and had been recently admitted from October 2nd to October 7th  There was also reported presyncopal episode with loss of consciousness  Patient was initially admitted to observation status, neurology consultation was obtained an MRI of the brain showed no acute ischemia  Psychiatry consultation was placed and the patient was reduced on her bipolar medications as it was felt that these were likely causing her vertigo symptoms  The patient's mental clarity did improve, she requested to be discharged home with outpatient therapy rather than short-term nursing placement  The patient did subsequently improve, she has outpatient follow-up with Psychiatry as well as services scheduled for home PT and OT  She is scheduled for cataract surgery on October 27th, and she would like to keep that appointment  At the time of discharge patient was tolerating oral diet she was without acute complaints and was medically cleared for discharge  DISCHARGE DIAGNOSES  Migraine  Assessment & Plan  Continue current regimen, No active migraine  Previous admission beginning of October, followed by [facility] Neurology  10/02- 10/07 - Previous admission   She was given three day course of valproic acid and magnesium  Haldol PRN held as likely causing sedation, would not resume at discharge  Osteoarthritis of lumbosacral spine without myelopathy  Assessment & Plan  Known OA, PT/OT evals      Fibromyalgia  Assessment & Plan  Recommend gated exercise program as an outpatient-     Bipolar depression [facility])  Assessment & Plan  Psychiatric consultation placed,  And medication adjustments noted  The patient has no homicidal or suicidal ideation  Outpatient follow up    * Vertigo  Assessment & Plan  Vertigo felt to be medication induced  The patient's Abilify was reduced to 7 5 mg, in addition her Luvox was reduced to 150 mg  She no longer reports having dizziness at this time    She does report that her confusion which has been present for quite some time is now improving as well  Will continue to monitor for any adverse side effects of her drug regimen  MRI of the brain was negative for acute infarct and Neurology did sign off  Improving, continue PT/OT evals        CONSULTING PROVIDERS   IP CONSULT TO PSYCHIATRY  IP CONSULT TO NEUROLOGY  IP CONSULT TO CASE MANAGEMENT    PROCEDURES PERFORMED  * No surgery found *    RADIOLOGY RESULTS  Xr Chest 2 Views    Result Date: 10/15/2019  Narrative: CHEST INDICATION:   fall  COMPARISON:  Chest radiograph from 4/11/2019  Chest CT from 7/22/2018  EXAM PERFORMED/VIEWS:  XR CHEST PA & LATERAL FINDINGS: Cardiomediastinal silhouette appears unremarkable  No acute disease  Mild linear scarring in the left midlung, most conspicuous on the lateral projection  No pneumothorax or pleural effusion  Osseous structures appear within normal limits for patient age  Impression: No acute cardiopulmonary disease  Workstation performed: [id number]     Xr Shoulder 2+ Vw Right    Result Date: 10/18/2019  Narrative: RIGHT SHOULDER INDICATION:   Fall with right shoulder pain  COMPARISON:  7/30/2019  VIEWS:  XR SHOULDER 2+ VW RIGHT FINDINGS: There is no acute fracture or dislocation  No significant degenerative changes  No lytic or blastic lesions are seen  Soft tissues are unremarkable  Impression: No acute osseous abnormality  Workstation performed: [id number]     Ct Head Without Contrast    Result Date: 10/15/2019  Narrative: CT BRAIN - WITHOUT CONTRAST INDICATION:   fall on eliquis  COMPARISON:  CT head 10/2/2019 TECHNIQUE:  CT examination of the brain was performed  In addition to axial images, coronal 2D reformatted images were created and submitted for interpretation  Radiation dose length product (DLP) for this visit:  988 mGy-cm     This examination, like all CT scans performed in the [facility], was performed utilizing techniques to minimize radiation dose exposure, including the use of iterative reconstruction and automated exposure control  IMAGE QUALITY:  Diagnostic  FINDINGS: PARENCHYMA:  No intracranial mass, mass effect or midline shift  No CT signs of acute infarction  No acute parenchymal hemorrhage  VENTRICLES AND EXTRA-AXIAL SPACES:  Normal for the patient's age  VISUALIZED ORBITS AND PARANASAL SINUSES:  Unremarkable  CALVARIUM AND EXTRACRANIAL SOFT TISSUES:  Normal      Impression: No acute intracranial abnormality  Workstation performed: [id number]     Ct Head Without Contrast    Result Date: 10/2/2019  Narrative: CT BRAIN - WITHOUT CONTRAST INDICATION:   syncope, fall  COMPARISON:  CT brain 7/30/2019 TECHNIQUE:  CT examination of the brain was performed  In addition to axial images, coronal 2D reformatted images were created and submitted for interpretation  Radiation dose length product (DLP) for this visit:  987 mGy-cm   This examination, like all CT scans performed in the [facility], was performed utilizing techniques to minimize radiation dose exposure, including the use of iterative reconstruction and automated exposure control  IMAGE QUALITY:  Diagnostic  FINDINGS: PARENCHYMA:  No intracranial mass, mass effect or midline shift  No CT signs of acute infarction  No acute parenchymal hemorrhage  VENTRICLES AND EXTRA-AXIAL SPACES:  Normal for the patient's age  VISUALIZED ORBITS AND PARANASAL SINUSES:  Unremarkable  CALVARIUM AND EXTRACRANIAL SOFT TISSUES:  Normal      Impression: No acute intracranial abnormality  Workstation performed: [id number]     Ct Cervical Spine Without Contrast    Result Date: 10/15/2019  Narrative: CT CERVICAL SPINE - WITHOUT CONTRAST INDICATION:   neck pain s/p fall on eliquis  COMPARISON:  None  TECHNIQUE:  CT examination of the cervical spine was performed without intravenous contrast   Contiguous axial images were obtained  Sagittal and coronal reconstructions were performed    Radiation dose length product (DLP) for this visit:   mGy-cm   This examination, like all CT scans performed in the [facility], was performed utilizing techniques to minimize radiation dose exposure, including the use of iterative reconstruction and automated exposure control  IMAGE QUALITY:  Diagnostic  FINDINGS: ALIGNMENT:  Normal alignment of the cervical spine  No subluxation  VERTEBRAL BODIES:  No fracture  DEGENERATIVE CHANGES:  Mild multilevel cervical degenerative changes are noted without critical central canal stenosis  PREVERTEBRAL AND PARASPINAL SOFT TISSUES:  Unremarkable  THORACIC INLET:  Normal      Impression: No cervical spine fracture or traumatic malalignment  Workstation performed: [id number]     Ct Cervical Spine Without Contrast    Result Date: 10/2/2019  Narrative: CT CERVICAL SPINE - WITHOUT CONTRAST INDICATION:   fall  COMPARISON:  CT cervical spine 7/30/2019  TECHNIQUE:  CT examination of the cervical spine was performed without intravenous contrast   Contiguous axial images were obtained  Sagittal and coronal reconstructions were performed  Radiation dose length product (DLP) for this visit:  603 mGy-cm   This examination, like all CT scans performed in the [facility], was performed utilizing techniques to minimize radiation dose exposure, including the use of iterative reconstruction and automated exposure control  IMAGE QUALITY:  Diagnostic  FINDINGS: ALIGNMENT:  Normal alignment of the cervical spine  No subluxation  VERTEBRAL BODIES:  No fracture  DEGENERATIVE CHANGES:  Mild multilevel cervical degenerative changes are noted without critical central canal stenosis  PREVERTEBRAL AND PARASPINAL SOFT TISSUES:  Unremarkable  THORACIC INLET:  Normal      Impression: No cervical spine fracture or traumatic malalignment  Workstation performed: [id number]     Mri Brain Iac Wo Contrast    Result Date: 10/17/2019  Narrative: MRI BRAIN AND IAC'S -  WITHOUT CONTRAST INDICATION: persistent vertigo  COMPARISON:  Brain MRI 5/20/2017 TECHNIQUE: Brain:   Sagittal T1, axial T2, axial [place], axial T1, axial FLAIR, axial diffusion imaging  IAC'S:  Coronal FIESTA, Targeted images of the IAC'S were performed requiring additional time at acquisition and interpretation of approximately 25% IV Contrast: IMAGE QUALITY:   Diagnostic  FINDINGS: BRAIN PARENCHYMA:  There is no discrete mass, mass effect or midline shift  Scattered periventricular and subcortical white matter nonenhancing T2/FLAIR hyperintense foci which are nonspecific but most consistent with mild chronic microangiopathy  There  is no intracranial hemorrhage  There is no evidence of acute infarction and diffusion imaging is unremarkable  IAC'S:  No CP angle mass  Bilateral 7th/8th nerve complexes appear unremarkable VENTRICLES:  Normal  SELLA AND PITUITARY GLAND:  Normal  ORBITS:  Normal  PARANASAL SINUSES:  Normal  VASCULATURE:  Evaluation of the major intracranial vasculature demonstrates appropriate flow voids  CALVARIUM AND SKULL BASE:  Normal  EXTRACRANIAL SOFT TISSUES:  Normal      Impression: 1  Grossly unremarkable study; however, cannot entirely exclude tiny IAC or inner ear lesions given lack of IV contrast  2   Scattered periventricular and subcortical white matter T2/FLAIR hyperintense foci, which are nonspecific, but most consistent with mild chronic microangiopathy   Workstation performed: [id number]       LABS  Results from last 7 days   Lab Units 10/16/19  0448 10/15/19  1800   WBC Thousand/uL 7 04 10 21*   HEMOGLOBIN g/dL 11 6 12 2   HEMATOCRIT % 37 3 37 4   MCV fL 100* 95   PLATELETS Thousands/uL 308 309     Results from last 7 days   Lab Units 10/16/19  0448 10/15/19  1800   SODIUM mmol/L 143 146*   POTASSIUM mmol/L 4 2 3 8   CHLORIDE mmol/L 112* 110*   CO2 mmol/L 24 29   BUN mg/dL 11 10   CREATININE mg/dL 0 65 0 73   CALCIUM mg/dL 8 6 9 1   EGFR ml/min/1 73sq m 97 90   GLUCOSE RANDOM mg/dL 91 98     Results from last 7 days   Lab Units 10/16/19  0209 10/15/19  2208 10/15/19  1857   TROPONIN I ng/mL <0 02 <0 02 <0 02                                    Cultures:   Results from last 7 days   Lab Units 10/15/19  1859   COLOR UA  Yellow   CLARITY UA  Clear   SPEC GRAV UA  1 015   PH UA  7 5   LEUKOCYTES UA  Trace*   NITRITE UA  Negative   GLUCOSE UA mg/dl Negative   KETONES UA mg/dl Negative   BILIRUBIN UA  Negative   BLOOD UA  Negative      Results from last 7 days   Lab Units 10/15/19  1859   RBC UA /hpf 0-1*   WBC UA /hpf 0-1*   EPITHELIAL CELLS WET PREP /hpf Occasional   BACTERIA UA /hpf Occasional            PHYSICAL EXAM:  Vitals:   Blood Pressure: 129/57 (10/20/19 0644)  Pulse: 71 (10/20/19 0644)  Temperature: 97 5 °F (36 4 °C) (10/20/19 0644)  Temp Source: Temporal (10/20/19 0644)  Respirations: 20 (10/20/19 0644)  Height: 5' 8" (172 7 cm) (10/15/19 2025)  Weight - Scale: 122 kg (269 lb 10 oz) (10/20/19 0600)  SpO2: 95 % (10/20/19 0644)    General: well appearing, no acute distress  HEENT: atraumatic, PERRLA, moist mucosa, normal pharynx, normal tonsils and adenoids, normal tongue, no fluid in sinuses  Neck: Trachea midline, no carotid bruit, no masses  Respiratory: normal chest wall expansion, CTA B, no r/r/w, no rubs  Cardiovascular: RRR, no m/r/g, Normal S1 and S2  Abdomen: Soft, non-tender, non-distended, normal bowel sounds in all quadrants, no hepatosplenomegaly, no tympany  Rectal: deferred  Musculoskeletal: normal ROM in upper and lower extremities  Integumentary: warm, dry, and pink, with no rash, purpura, or petechia  Heme/Lymph: no lymphadenopathy, no bruises  Neurological: Cranial Nerves II-XII grossly intact, no tics, normal sensation to pressure and light touch  Psychiatric: cooperative with normal mood, affect, and cognition      Discharge Disposition: Home with [facility]      Test Results Pending at Discharge:           Medications   · Summary of Medication Adjustments made as a result of this hospitalization: Reduction in Abilify to 7 5 mg, Luvox reduced to 150 mg  · Medication Dosing Tapers - Please refer to Discharge Medication List for details on any medication dosing tapers (if applicable to patient)  · Discharge Medication List: See after visit summary for reconciled discharge medications  Diet restrictions:         Diet Orders   (From admission, onward)             Start     Ordered    10/17/19 1231  Room Service  Once     Question:  Type of Service  Answer:  Room Service-Appropriate    10/17/19 1230    10/15/19 2023  Diet Regular; Regular House  Diet effective now     Question Answer Comment   Diet Type Regular    Regular Regular House    RD to adjust diet per protocol? Yes        10/15/19 2022              Activity restrictions: No strenuous activity  Discharge Condition: good    Outpatient Follow-Up and Discharge Instructions  See after visit summary section titled Discharge Instructions for information provided to patient and family  Code Status: Level 1 - Full Code  Discharge Statement   I spent 35 minutes discharging the patient  This time was spent on the day of discharge  Greater than 50% of total time was spent with the patient and / or family counseling and / or coordination of care  ** Please Note: This note has been constructed using a voice recognition system   **

## 2019-10-20 NOTE — DISCHARGE INSTRUCTIONS
Bipolar Disorder   WHAT YOU SHOULD KNOW:   Bipolar disorder is a long-term chemical imbalance that causes rapid changes in mood and behavior  High moods are called dante  Low moods are called depression  Sometimes you will feel manic and sometimes you will feel depressed  You can have dante and depression at the same time  This is called a mixed bipolar state  CARE AGREEMENT:   You have the right to help plan your care  Learn about your health condition and how it may be treated  Discuss treatment options with your caregivers to decide what care you want to receive  You always have the right to refuse treatment  RISKS:   The periods of dante and depression may last for weeks or months  Without treatment, your bipolar disorder could get worse  Your illness could make it hard to work and get along with others  It may also affect your eating and sleeping  WHILE YOU ARE HERE:   What caregivers will I work with while being treated for bipolar disorder? · Psychiatrist: This is a medical doctor who works in mental health  The psychiatrist is in charge of ordering your medicine  You may work closely with this doctor and other caregivers  · Therapist: This is a caregiver that works closely with you while you are being treated  This person may be a doctor, psychologist, nurse, mental health counselor, or   What meetings might I need to attend? · Family meetings: Your caregivers will meet with you and your family  You will talk about how to cope with your illness  · Group therapy: A series of meetings that you attend with other patients and staff  During these meetings, patients and staff talk together about ways to cope with illness  · Individual therapy: A time for you to meet alone with your therapist  During this time you and your therapist may talk about how to cope with your illness      · Intensive outpatient program: This is when you come to the hospital or clinic for 1 to 3 hours of treatment  This program is usually 2 to 5 times a week for a short period of time  · Partial care program: This is when you come to the hospital unit every day during the day or evening  After you are treated each day, you can return home  You may need a partial care program after you have been treated in the hospital  Caregivers may also suggest this program instead of having you stay in the hospital   What other treatments may be included in my treatment plan? · Quiet room: This is an empty room used for patients who need to have time out in a safe place  You may be put here if caregivers are concerned you may hurt yourself or others  · Restraints: There are 2 types of restraints that may be used while you are in the hospital  They will only be used if caregivers feel you are in danger of hurting yourself or others  Physical restraints may be put on your wrists or ankles and tied to something else  These are usually cloth or leather bands  Other things will always be tried before using physical restraints, such as going into a quiet room or seclusion  Caregivers may use chemical restraints instead  This is medicine used to help you gain control of your actions and help you relax  Restraints should never be used to punish you  · Seclusion: This is when you need to be locked in a safe room because you are out of control  The door is locked because you might want to leave the room  Caregivers will closely watch you while you are in seclusion  You may come out of seclusion when caregivers feel you will not hurt yourself or others  · Time out: This is time spent away from other people  This is usually needed when you are not able to control your behavior  You may be put in time out if your behavior is affecting others  Time out may be in your room or another room  · Electroconvulsive therapy: This is also called ECT   It may be used for patients with life threatening depression that medicine or therapy has not helped  With ECT, a small amount of electric shock is sent through the brain  Before ECT treatment, you may get medicine to help you relax  After treatment, you may have trouble remembering things for a short time  What else should I know about being treated for bipolar disorder? · 72-hour hold: This is when you are put in the hospital for 72 hours without your permission  The police or a caregiver may decide to put you in the hospital  This may only be done if others are concerned that you may hurt yourself or someone else  It may also be done if caregivers or police do not think you can safely care for yourself  · Clothes: You may wear your own clothes while you are in the hospital     · Inpatient unit: This is the place where you will stay while in the hospital  It usually has bedrooms and a living area  Sometimes the doors of this unit are locked  · Meals: You will eat your meals on the unit or in the cafeteria with other patients  · Personal belongings: When you are admitted to the unit, caregivers may search your belongings  Any belongings brought to you during your stay may also be searched  This search is done to keep you and the staff safe  · Release of information form: This is a legal paper that lets caregivers share information with those listed on this form  You will need to sign this form before any information will be released to persons outside the hospital     · Right to privacy: Information that you share with your caregivers will be kept private among hospital caregivers  They will not share information with others without your permission  · Sharps: You will not be allowed to keep any sharp items with you  Sharp items include scissors, nail files, razors, or glass  Ask a caregiver if you need to use one of these items  © 2014 [employer] is for End User's use only and may not be sold, redistributed or otherwise used for commercial purposes  All illustrations and images included in CareNotes® are the copyrighted property of [employer]  or Jesse Spain  The above information is an  only  It is not intended as medical advice for individual conditions or treatments  Talk to your doctor, nurse or pharmacist before following any medical regimen to see if it is safe and effective for you  Vertigo   WHAT YOU NEED TO KNOW:   Vertigo is a condition that causes you to feel dizzy  You may feel that you or everything around you is moving or spinning  You may also feel like you are being pulled down or toward your side  DISCHARGE INSTRUCTIONS:   Seek care immediately if:   · You have a headache and a stiff neck  · You have shaking chills and a fever  · You vomit over and over with no relief  · You have blood, pus, or fluid coming out of your ears  · You are confused  Contact your healthcare provider if:   · Your symptoms do not get better with treatment  · You have questions about your condition or care  Medicines:   · Medicine  may be given to help relieve your symptoms  · Take your medicine as directed  Contact your healthcare provider if you think your medicine is not helping or if you have side effects  Tell him or her if you are allergic to any medicine  Keep a list of the medicines, vitamins, and herbs you take  Include the amounts, and when and why you take them  Bring the list or the pill bottles to follow-up visits  Carry your medicine list with you in case of an emergency  Manage your symptoms:   · Do not drive , walk without help, or operate heavy machinery when you are dizzy  · Move slowly  when you move from one position to another position  Get up slowly from sitting or lying down  Sit or lie down right away if you feel dizzy  · Drink plenty of liquids  Liquids help prevent dehydration  Ask how much liquid to drink each day and which liquids are best for you      · Vestibular and balance rehabilitation therapy (VBRT)  is used to teach you exercises to improve your balance and strength  These exercises may help decrease your vertigo and improve your balance  Ask for more information about this therapy  Follow up with your healthcare provider as directed:  Write down your questions so you remember to ask them during your visits  © 2017 [facility] [place][note 2] Information is for End User's use only and may not be sold, redistributed or otherwise used for commercial purposes  All illustrations and images included in CareNotes® are the copyrighted property of [employer]  or Jesse Spain  The above information is an  only  It is not intended as medical advice for individual conditions or treatments  Talk to your doctor, nurse or pharmacist before following any medical regimen to see if it is safe and effective for you

## 2019-10-20 NOTE — ASSESSMENT & PLAN NOTE
Psychiatric consultation placed,  And medication adjustments noted     The patient has no homicidal or suicidal ideation  Outpatient follow up

## 2019-10-20 NOTE — PLAN OF CARE
Problem: Potential for Falls  Goal: Patient will remain free of falls  Description  INTERVENTIONS:  - Assess patient frequently for physical needs  -  Identify cognitive and physical deficits and behaviors that affect risk of falls    -  [place] fall precautions as indicated by assessment   - Educate patient/family on patient safety including physical limitations  - Instruct patient to call for assistance with activity based on assessment  - Modify environment to reduce risk of injury  - Consider OT/PT consult to assist with strengthening/mobility  Outcome: Adequate for Discharge     Problem: PAIN - ADULT  Goal: Verbalizes/displays adequate comfort level or baseline comfort level  Description  Interventions:  - Encourage patient to monitor pain and request assistance  - Assess pain using appropriate pain scale  - Administer analgesics based on type and severity of pain and evaluate response  - Implement non-pharmacological measures as appropriate and evaluate response  - Consider cultural and social influences on pain and pain management  - Notify physician/advanced practitioner if interventions unsuccessful or patient reports new pain  Outcome: Adequate for Discharge     Problem: INFECTION - ADULT  Goal: Absence or prevention of progression during hospitalization  Description  INTERVENTIONS:  - Assess and monitor for signs and symptoms of infection  - Monitor lab/diagnostic results  - Monitor all insertion sites, i e  indwelling lines, tubes, and drains  - Monitor endotracheal if appropriate and nasal secretions for changes in amount and color  - [place] appropriate cooling/warming therapies per order  - Administer medications as ordered  - Instruct and encourage patient and family to use good hand hygiene technique  - Identify and instruct in appropriate isolation precautions for identified infection/condition  Outcome: Adequate for Discharge  Goal: Absence of fever/infection during neutropenic period  Description  INTERVENTIONS:  - Monitor WBC    Outcome: Adequate for Discharge     Problem: SAFETY ADULT  Goal: Patient will remain free of falls  Description  INTERVENTIONS:  - Assess patient frequently for physical needs  -  Identify cognitive and physical deficits and behaviors that affect risk of falls    -  [place] fall precautions as indicated by assessment   - Educate patient/family on patient safety including physical limitations  - Instruct patient to call for assistance with activity based on assessment  - Modify environment to reduce risk of injury  - Consider OT/PT consult to assist with strengthening/mobility  Outcome: Adequate for Discharge  Goal: Maintain or return to baseline ADL function  Description  INTERVENTIONS:  -  Assess patient's ability to carry out ADLs; assess patient's baseline for ADL function and identify physical deficits which impact ability to perform ADLs (bathing, care of mouth/teeth, toileting, grooming, dressing, etc )  - Assess/evaluate cause of self-care deficits   - Assess range of motion  - Assess patient's mobility; develop plan if impaired  - Assess patient's need for assistive devices and provide as appropriate  - Encourage maximum independence but intervene and supervise when necessary  - Involve family in performance of ADLs  - Assess for home care needs following discharge   - Consider OT consult to assist with ADL evaluation and planning for discharge  - Provide patient education as appropriate  Outcome: Adequate for Discharge  Goal: Maintain or return mobility status to optimal level  Description  INTERVENTIONS:  - Assess patient's baseline mobility status (ambulation, transfers, stairs, etc )    - Identify cognitive and physical deficits and behaviors that affect mobility  - Identify mobility aids required to assist with transfers and/or ambulation (gait belt, sit-to-stand, lift, walker, cane, etc )  - [place] fall precautions as indicated by assessment  - Record patient progress and toleration of activity level on Mobility SBAR; progress patient to next Phase/Stage  - Instruct patient to call for assistance with activity based on assessment  - Consider rehabilitation consult to assist with strengthening/weightbearing, etc   Outcome: Adequate for Discharge     Problem: DISCHARGE PLANNING  Goal: Discharge to home or other facility with appropriate resources  Description  INTERVENTIONS:  - Identify barriers to discharge w/patient and caregiver  - Arrange for needed discharge resources and transportation as appropriate  - Identify discharge learning needs (meds, wound care, etc )  - Arrange for interpretive services to assist at discharge as needed  - Refer to Case Management Department for coordinating discharge planning if the patient needs post-hospital services based on physician/advanced practitioner order or complex needs related to functional status, cognitive ability, or social support system  Outcome: Adequate for Discharge     Problem: Knowledge Deficit  Goal: Patient/family/caregiver demonstrates understanding of disease process, treatment plan, medications, and discharge instructions  Description  Complete learning assessment and assess knowledge base    Interventions:  - Provide teaching at level of understanding  - Provide teaching via preferred learning methods  Outcome: Adequate for Discharge     Problem: Prexisting or High Potential for Compromised Skin Integrity  Goal: Skin integrity is maintained or improved  Description  INTERVENTIONS:  - Identify patients at risk for skin breakdown  - Assess and monitor skin integrity  - Assess and monitor nutrition and hydration status  - Monitor labs   - Assess for incontinence   - Turn and reposition patient  - Assist with mobility/ambulation  - Relieve pressure over bony prominences  - Avoid friction and shearing  - Provide appropriate hygiene as needed including keeping skin clean and dry  - Evaluate need for skin moisturizer/barrier cream  - Collaborate with interdisciplinary team   - Patient/family teaching  - Consider wound care consult   Outcome: Adequate for Discharge

## 2019-10-20 NOTE — DISCHARGE INSTR - AVS FIRST PAGE
Please continue routine follow up with your behavioral health provider    - Home OT/PT to be scheduled

## 2019-10-21 NOTE — UTILIZATION REVIEW
Notification of Discharge  This is a Notification of Discharge from our facility [address]  Please be advised that this patient has been discharge from our facility  Below you will find the admission and discharge date and time including the patients disposition  PRESENTATION DATE: 10/15/2019  2:43 PM    IP ADMISSION DATE: 10/15/19 1755   DISCHARGE DATE: 10/20/2019 11:45 AM  DISPOSITION: Home with Alfonso Gomez with [address] [facility]   Admission Orders listed below:  Admission Orders (From admission, onward)     Ordered        10/18/19 1656  Inpatient Admission  Once         10/15/19 1827  Place in Observation (expected length of stay for this patient is less than two midnights)  Once         10/15/19 1755  Inpatient Admission  Once,   Status:  Canceled                   Please contact the UR Department if additional information is required to close this patient's authorization/case  [facility]  Utilization Review Department  Phone: [phone]; Fax [phone]  [email or web address]  ATTENTION: Please call with any questions or concerns to [phone]  and carefully listen to the prompts so that you are directed to the right person  Send all requests for admission clinical reviews, approved or denied determinations and any other requests to fax [phone]   All voicemails are confidential

## 2019-11-19 ENCOUNTER — DOCUMENTATION (OUTPATIENT)
Dept: CASE MANAGEMENT | Facility: OTHER | Age: 59
End: 2019-11-19

## 2019-11-19 ENCOUNTER — PATIENT OUTREACH (OUTPATIENT)
Dept: CASE MANAGEMENT | Facility: OTHER | Age: 59
End: 2019-11-19

## 2019-11-19 DIAGNOSIS — Z71.89 COMPLEX CARE COORDINATION: Primary | ICD-10-CM

## 2019-11-19 NOTE — MEDICAL HIGH UTILIZER
Ricky 53 for: Derrick Blank  Patient Name: Derrick Blank          MRN: [id number]       : 1960 Age: 59       Sex:  F   Utilization Background: She is a 59year old female with a history of migraine, [place]s disease, Bipolar, Depression, Fibromyalgia, and HTN who presents to [facility] and [facility] with migraine  She has 14 ER visits, 9 admission, and 2 transfers to \[facility]\"" for Ketamine Infusions in 2019  Discussed with Neurology reveals that patient does not feel much better even after prolonged hospitalization  Treatment Recommendations:  ED Recommendations: Recommendations as per neurology: Give migraine protocol: using steroid protocol d/t toradol allergy  Recommend calling her [facility] Neurologist to schedule close outpatient follow up  It is noted that the patient will provide other complaints to the ER providers to get admitted and then will complain of migraine in the hospital    Would not offer transfer for Ketamine Infusion to this patient as it has not worked in the past  This should be left up to Neurology to determine if this is appropriate  Inpatient Recommendations: Early consultation with neurology  Avoid narcotics/sedating agents due to over sedation in the past       Outpatient recommendations: Needs close follow up with [facility] Neurology  Needs CM to make sure that patient does not run out of her meds as she has in the past     Situation: Patient is a 60year old patient who presents frequently for migraines  It seems that she has started using other chief complaints to get admitted to the hospital for migraine treatment   As per neurology colleague her headache symptomatology does not usually change with treatment      PMH/PSH:  Migraine, Depression, Bipolar, Fibromyalgia, HTN, Hx of DVT      Assessment                              Drivers of repeated utilization:                 Symptomatology     Community Resources in place:    None - she has mentioned that she does not have a  for infusions  May need assistance with transportation and with medications   Patient Care Team:   Salud Holley DO - PCP [facility]  Lisa Ramirez MD - Neurology [facility]  OP Care Manager: Laura Thomas RN              Care plan date and owner: Shannan Liriano PA-C 10/31/2019         Reviewed with patient before discharge?

## 2019-11-24 ENCOUNTER — TELEPHONE (OUTPATIENT)
Dept: OTHER | Facility: HOSPITAL | Age: 59
End: 2019-11-24

## 2019-11-25 NOTE — TELEPHONE ENCOUNTER
Left message for patient introducing myself and my role in the network with the high utilizer team  Informed patient that a  will be reaching out in the coming weeks and that we are here for her to use as a resource  Terry Palomares PA-C

## 2020-02-21 ENCOUNTER — DOCUMENTATION (OUTPATIENT)
Dept: CASE MANAGEMENT | Facility: OTHER | Age: 60
End: 2020-02-21

## 2020-02-21 NOTE — MEDICAL HIGH UTILIZER
Ricky 53 for: Meri Wells  Patient Name: Meri Wells          MRN: [id number]       : 1960   Age: 62       Sex:  F   Utilization Background: She is a female with a history of migraine, [place]s disease, Bipolar, Depression, Fibromyalgia, and HTN who presents to [facility] and [facility] with migraine  She has 14 ER visits, 9 admission, and 2 transfers to [facility] for Ketamine Infusions in 2019  Discussed with Neurology reveals that patient does not feel much better even after prolonged hospitalization         Treatment Recommendations:  ED Recommendations: Recommendations as per neurology: Give migraine protocol: using steroid protocol d/t toradol allergy  Recommend calling her [facility] Neurologist to schedule close outpatient follow up  It is noted that the patient will provide other complaints to the ER providers to get admitted and then will complain of migraine in the hospital    Would not offer transfer for Ketamine Infusion to this patient as it has not worked in the past  This should be left up to Neurology to determine if this is appropriate         Inpatient Recommendations: Early consultation with neurology  Avoid narcotics/sedating agents due to over sedation in the past         Outpatient recommendations: Needs close follow up with [facility] Neurology  Needs CM to make sure that patient does not run out of her meds as she has in the past     Situation: Patient is a patient who presents frequently for migraines  It seems that she has started using other chief complaints to get admitted to the hospital for migraine treatment   As per neurology colleague her headache symptomatology does not usually change with treatment        PMH/PSH:  Migraine, Depression, Bipolar, Fibromyalgia, HTN, Hx of DVT      Assessment                              Drivers of repeated utilization:                 Symptomatology      Community Resources in place:    None - she has mentioned that she does not have a  for infusions  May need assistance with transportation and with medications   Patient Care Team:   Vannessa Lindsey DO - PCP [facility]  Cristobal Eaton MD - Neurology [facility]  OP Care Manager: Saima Gallardo RN                    Care plan date and owner: Sadaf Saver  Yara Hernandez PA-C 10/31/2019         Reviewed with patient before discharge?

## 2020-05-27 ENCOUNTER — DOCUMENTATION (OUTPATIENT)
Dept: CASE MANAGEMENT | Facility: OTHER | Age: 60
End: 2020-05-27

## 2020-06-27 ENCOUNTER — HOSPITAL ENCOUNTER (INPATIENT)
Facility: HOSPITAL | Age: 60
LOS: 3 days | Discharge: HOME | DRG: 312 | End: 2020-07-01
Attending: EMERGENCY MEDICINE | Admitting: FAMILY MEDICINE
Payer: COMMERCIAL

## 2020-06-27 ENCOUNTER — APPOINTMENT (EMERGENCY)
Dept: CT IMAGING | Facility: HOSPITAL | Age: 60
DRG: 312 | End: 2020-06-27
Payer: COMMERCIAL

## 2020-06-27 ENCOUNTER — APPOINTMENT (EMERGENCY)
Dept: RADIOLOGY | Facility: HOSPITAL | Age: 60
DRG: 312 | End: 2020-06-27
Payer: COMMERCIAL

## 2020-06-27 DIAGNOSIS — R55 SYNCOPE: Primary | ICD-10-CM

## 2020-06-27 DIAGNOSIS — S09.90XA INJURY OF HEAD, INITIAL ENCOUNTER: ICD-10-CM

## 2020-06-27 DIAGNOSIS — G43.809 OTHER MIGRAINE WITHOUT STATUS MIGRAINOSUS, NOT INTRACTABLE: ICD-10-CM

## 2020-06-27 DIAGNOSIS — S29.011A MUSCLE STRAIN OF CHEST WALL, INITIAL ENCOUNTER: ICD-10-CM

## 2020-06-27 DIAGNOSIS — R42 DIZZINESS: ICD-10-CM
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
## 2020-06-27 LAB
ANION GAP SERPL CALCULATED.3IONS-SCNC: 6 MMOL/L (ref 4–13)
BASOPHILS # BLD AUTO: 0.07 THOUSANDS/ΜL (ref 0–0.1)
BASOPHILS NFR BLD AUTO: 1 % (ref 0–1)
BUN SERPL-MCNC: 11 MG/DL (ref 5–25)
CALCIUM SERPL-MCNC: 8.9 MG/DL (ref 8.3–10.1)
CHLORIDE SERPL-SCNC: 103 MMOL/L (ref 100–108)
CO2 SERPL-SCNC: 36 MMOL/L (ref 21–32)
CREAT SERPL-MCNC: 0.81 MG/DL (ref 0.6–1.3)
EOSINOPHIL # BLD AUTO: 0.11 THOUSAND/ΜL (ref 0–0.61)
EOSINOPHIL NFR BLD AUTO: 1 % (ref 0–6)
ERYTHROCYTE [DISTWIDTH] IN BLOOD BY AUTOMATED COUNT: 14.1 % (ref 11.6–15.1)
GFR SERPL CREATININE-BSD FRML MDRD: 79 ML/MIN/1.73SQ M
GLUCOSE SERPL-MCNC: 90 MG/DL (ref 65–140)
HCT VFR BLD AUTO: 41.2 % (ref 34.8–46.1)
HGB BLD-MCNC: 13.6 G/DL (ref 11.5–15.4)
IMM GRANULOCYTES # BLD AUTO: 0.05 THOUSAND/UL (ref 0–0.2)
IMM GRANULOCYTES NFR BLD AUTO: 1 % (ref 0–2)
LYMPHOCYTES # BLD AUTO: 2.05 THOUSANDS/ΜL (ref 0.6–4.47)
LYMPHOCYTES NFR BLD AUTO: 24 % (ref 14–44)
MAGNESIUM SERPL-MCNC: 2.1 MG/DL (ref 1.6–2.6)
MCH RBC QN AUTO: 32.3 PG (ref 26.8–34.3)
MCHC RBC AUTO-ENTMCNC: 33 G/DL (ref 31.4–37.4)
MCV RBC AUTO: 98 FL (ref 82–98)
MONOCYTES # BLD AUTO: 0.72 THOUSAND/ΜL (ref 0.17–1.22)
MONOCYTES NFR BLD AUTO: 8 % (ref 4–12)
NEUTROPHILS # BLD AUTO: 5.73 THOUSANDS/ΜL (ref 1.85–7.62)
NEUTS SEG NFR BLD AUTO: 65 % (ref 43–75)
NRBC BLD AUTO-RTO: 0 /100 WBCS
PLATELET # BLD AUTO: 230 THOUSANDS/UL (ref 149–390)
PLATELET # BLD AUTO: 296 THOUSANDS/UL (ref 149–390)
PMV BLD AUTO: 11 FL (ref 8.9–12.7)
PMV BLD AUTO: 9.9 FL (ref 8.9–12.7)
POTASSIUM SERPL-SCNC: 2.6 MMOL/L (ref 3.5–5.3)
RBC # BLD AUTO: 4.21 MILLION/UL (ref 3.81–5.12)
SODIUM SERPL-SCNC: 145 MMOL/L (ref 136–145)
TROPONIN I SERPL-MCNC: <0.02 NG/ML
WBC # BLD AUTO: 8.73 THOUSAND/UL (ref 4.31–10.16)

## 2020-06-27 PROCEDURE — 71046 X-RAY EXAM CHEST 2 VIEWS: CPT

## 2020-06-27 PROCEDURE — 36415 COLL VENOUS BLD VENIPUNCTURE: CPT | Performed by: EMERGENCY MEDICINE

## 2020-06-27 PROCEDURE — 80048 BASIC METABOLIC PNL TOTAL CA: CPT | Performed by: EMERGENCY MEDICINE

## 2020-06-27 PROCEDURE — 84484 ASSAY OF TROPONIN QUANT: CPT | Performed by: EMERGENCY MEDICINE

## 2020-06-27 PROCEDURE — 99285 EMERGENCY DEPT VISIT HI MDM: CPT

## 2020-06-27 PROCEDURE — 85049 AUTOMATED PLATELET COUNT: CPT | Performed by: PHYSICIAN ASSISTANT

## 2020-06-27 PROCEDURE — 83735 ASSAY OF MAGNESIUM: CPT | Performed by: PHYSICIAN ASSISTANT

## 2020-06-27 PROCEDURE — 70450 CT HEAD/BRAIN W/O DYE: CPT

## 2020-06-27 PROCEDURE — 85025 COMPLETE CBC W/AUTO DIFF WBC: CPT | Performed by: EMERGENCY MEDICINE

## 2020-06-27 PROCEDURE — 99285 EMERGENCY DEPT VISIT HI MDM: CPT | Performed by: EMERGENCY MEDICINE

## 2020-06-27 PROCEDURE — 93005 ELECTROCARDIOGRAM TRACING: CPT

## 2020-06-27 PROCEDURE — 96365 THER/PROPH/DIAG IV INF INIT: CPT

## 2020-06-27 PROCEDURE — 84484 ASSAY OF TROPONIN QUANT: CPT | Performed by: PHYSICIAN ASSISTANT

## 2020-06-27 PROCEDURE — 99220 PR INITIAL OBSERVATION CARE/DAY 70 MINUTES: CPT | Performed by: PHYSICIAN ASSISTANT

## 2020-06-27 RX ORDER — POTASSIUM CHLORIDE 14.9 MG/ML
20 INJECTION INTRAVENOUS ONCE
Status: COMPLETED | OUTPATIENT
Start: 2020-06-27 | End: 2020-06-27

## 2020-06-27 RX ORDER — ONDANSETRON 2 MG/ML
4 INJECTION INTRAMUSCULAR; INTRAVENOUS EVERY 6 HOURS PRN
Status: DISCONTINUED | OUTPATIENT
Start: 2020-06-27 | End: 2020-07-01 | Stop reason: HOSPADM

## 2020-06-27 RX ORDER — ONDANSETRON 4 MG/1
4 TABLET, ORALLY DISINTEGRATING ORAL ONCE
Status: COMPLETED | OUTPATIENT
Start: 2020-06-27 | End: 2020-06-27

## 2020-06-27 RX ORDER — MEXILETINE HYDROCHLORIDE 150 MG/1
150 CAPSULE ORAL 3 TIMES DAILY
Status: DISCONTINUED | OUTPATIENT
Start: 2020-06-27 | End: 2020-07-01 | Stop reason: HOSPADM

## 2020-06-27 RX ORDER — POTASSIUM CHLORIDE 20 MEQ/1
40 TABLET, EXTENDED RELEASE ORAL ONCE
Status: COMPLETED | OUTPATIENT
Start: 2020-06-27 | End: 2020-06-27

## 2020-06-27 RX ORDER — HEPARIN SODIUM 5000 [USP'U]/ML
5000 INJECTION, SOLUTION INTRAVENOUS; SUBCUTANEOUS EVERY 8 HOURS SCHEDULED
Status: DISCONTINUED | OUTPATIENT
Start: 2020-06-27 | End: 2020-06-27

## 2020-06-27 RX ORDER — METOCLOPRAMIDE HYDROCHLORIDE 5 MG/ML
10 INJECTION INTRAMUSCULAR; INTRAVENOUS ONCE
Status: COMPLETED | OUTPATIENT
Start: 2020-06-27 | End: 2020-06-27

## 2020-06-27 RX ORDER — LORAZEPAM 2 MG/1
4 TABLET ORAL
COMMUNITY
End: 2020-07-09 | Stop reason: HOSPADM

## 2020-06-27 RX ORDER — LORAZEPAM 1 MG/1
2 TABLET ORAL 2 TIMES DAILY
Status: DISCONTINUED | OUTPATIENT
Start: 2020-06-27 | End: 2020-07-01 | Stop reason: HOSPADM

## 2020-06-27 RX ORDER — ACETAMINOPHEN 325 MG/1
650 TABLET ORAL ONCE
Status: COMPLETED | OUTPATIENT
Start: 2020-06-27 | End: 2020-06-27

## 2020-06-27 RX ORDER — ZONISAMIDE 100 MG/1
200 CAPSULE ORAL DAILY
Status: DISCONTINUED | OUTPATIENT
Start: 2020-06-28 | End: 2020-07-01 | Stop reason: HOSPADM

## 2020-06-27 RX ORDER — LORAZEPAM 1 MG/1
4 TABLET ORAL
Status: DISCONTINUED | OUTPATIENT
Start: 2020-06-27 | End: 2020-07-01 | Stop reason: HOSPADM

## 2020-06-27 RX ORDER — IBUPROFEN 400 MG/1
400 TABLET ORAL ONCE
Status: COMPLETED | OUTPATIENT
Start: 2020-06-27 | End: 2020-06-27

## 2020-06-27 RX ORDER — GABAPENTIN 300 MG/1
600 CAPSULE ORAL 3 TIMES DAILY
Status: DISCONTINUED | OUTPATIENT
Start: 2020-06-27 | End: 2020-07-01 | Stop reason: HOSPADM

## 2020-06-27 RX ORDER — KETOROLAC TROMETHAMINE 30 MG/ML
15 INJECTION, SOLUTION INTRAMUSCULAR; INTRAVENOUS ONCE
Status: COMPLETED | OUTPATIENT
Start: 2020-06-27 | End: 2020-06-27

## 2020-06-27 RX ORDER — FLUVOXAMINE MALEATE 50 MG/1
300 TABLET, COATED ORAL
Status: DISCONTINUED | OUTPATIENT
Start: 2020-06-27 | End: 2020-07-01 | Stop reason: HOSPADM

## 2020-06-27 RX ORDER — LAMOTRIGINE 100 MG/1
200 TABLET ORAL DAILY
Status: DISCONTINUED | OUTPATIENT
Start: 2020-06-28 | End: 2020-07-01 | Stop reason: HOSPADM

## 2020-06-27 RX ORDER — POTASSIUM CHLORIDE AND SODIUM CHLORIDE 900; 300 MG/100ML; MG/100ML
75 INJECTION, SOLUTION INTRAVENOUS CONTINUOUS
Status: DISCONTINUED | OUTPATIENT
Start: 2020-06-27 | End: 2020-06-29

## 2020-06-27 RX ADMIN — SODIUM CHLORIDE 500 ML: 0.9 INJECTION, SOLUTION INTRAVENOUS at 15:42

## 2020-06-27 RX ADMIN — POTASSIUM CHLORIDE 40 MEQ: 1500 TABLET, EXTENDED RELEASE ORAL at 15:27

## 2020-06-27 RX ADMIN — KETOROLAC TROMETHAMINE 15 MG: 30 INJECTION, SOLUTION INTRAMUSCULAR at 20:02

## 2020-06-27 RX ADMIN — POTASSIUM CHLORIDE 20 MEQ: 14.9 INJECTION, SOLUTION INTRAVENOUS at 15:44

## 2020-06-27 RX ADMIN — GABAPENTIN 600 MG: 300 CAPSULE ORAL at 21:32

## 2020-06-27 RX ADMIN — ACETAMINOPHEN 650 MG: 325 TABLET ORAL at 14:00

## 2020-06-27 RX ADMIN — MEXILETINE HYDROCHLORIDE 150 MG: 150 CAPSULE ORAL at 21:32

## 2020-06-27 RX ADMIN — FLUVOXAMINE MALEATE 300 MG: 50 TABLET ORAL at 21:35

## 2020-06-27 RX ADMIN — IBUPROFEN 400 MG: 400 TABLET ORAL at 15:31

## 2020-06-27 RX ADMIN — METOCLOPRAMIDE 10 MG: 5 INJECTION, SOLUTION INTRAMUSCULAR; INTRAVENOUS at 19:35

## 2020-06-27 RX ADMIN — APIXABAN 5 MG: 5 TABLET, FILM COATED ORAL at 21:32

## 2020-06-27 RX ADMIN — ONDANSETRON 4 MG: 4 TABLET, ORALLY DISINTEGRATING ORAL at 15:27

## 2020-06-27 RX ADMIN — LORAZEPAM 4 MG: 1 TABLET ORAL at 21:32

## 2020-06-27 RX ADMIN — POTASSIUM CHLORIDE AND SODIUM CHLORIDE 75 ML/HR: 900; 300 INJECTION, SOLUTION INTRAVENOUS at 21:27

## 2020-06-27 NOTE — ASSESSMENT & PLAN NOTE
Presenting with potassium of 2 6  Likely secondary to poor oral intake and history of gastric bypass 2015  Received 60 mEq potassium thus far  Continue replete and recheck in a m    Monitor on telemetry

## 2020-06-27 NOTE — ED NOTES
[facility] TARAS TIJERINA RN, attempting IV with ultrasound machine      Elvira Deras RN  06/27/20 5989

## 2020-06-27 NOTE — PLAN OF CARE
Problem: Potential for Falls  Goal: Patient will remain free of falls  Description  INTERVENTIONS:  - Assess patient frequently for physical needs  -  Identify cognitive and physical deficits and behaviors that affect risk of falls    -  [place] fall precautions as indicated by assessment   - Educate patient/family on patient safety including physical limitations  - Instruct patient to call for assistance with activity based on assessment  - Modify environment to reduce risk of injury  - Consider OT/PT consult to assist with strengthening/mobility  Outcome: Progressing     Problem: Prexisting or High Potential for Compromised Skin Integrity  Goal: Skin integrity is maintained or improved  Description  INTERVENTIONS:  - Identify patients at risk for skin breakdown  - Assess and monitor skin integrity  - Assess and monitor nutrition and hydration status  - Monitor labs   - Assess for incontinence   - Turn and reposition patient  - Assist with mobility/ambulation  - Relieve pressure over bony prominences  - Avoid friction and shearing  - Provide appropriate hygiene as needed including keeping skin clean and dry  - Evaluate need for skin moisturizer/barrier cream  - Collaborate with interdisciplinary team   - Patient/family teaching  - Consider wound care consult   Outcome: Progressing     Problem: CARDIOVASCULAR - ADULT  Goal: Maintains optimal cardiac output and hemodynamic stability  Description  INTERVENTIONS:  - Monitor I/O, vital signs and rhythm  - Monitor for S/S and trends of decreased cardiac output  - Administer and titrate ordered vasoactive medications to optimize hemodynamic stability  - Assess quality of pulses, skin color and temperature  - Assess for signs of decreased coronary artery perfusion  - Instruct patient to report change in severity of symptoms  Outcome: Progressing  Goal: Absence of cardiac dysrhythmias or at baseline rhythm  Description  INTERVENTIONS:  - Continuous cardiac monitoring, vital signs, obtain 12 lead EKG if ordered  - Administer antiarrhythmic and heart rate control medications as ordered  - Monitor electrolytes and administer replacement therapy as ordered  Outcome: Progressing     Problem: METABOLIC, FLUID AND ELECTROLYTES - ADULT  Goal: Electrolytes maintained within normal limits  Description  INTERVENTIONS:  - Monitor labs and assess patient for signs and symptoms of electrolyte imbalances  - Administer electrolyte replacement as ordered  - Monitor response to electrolyte replacements, including repeat lab results as appropriate  - Instruct patient on fluid and nutrition as appropriate  Outcome: Progressing  Goal: Fluid balance maintained  Description  INTERVENTIONS:  - Monitor labs   - Monitor I/O and WT  - Instruct patient on fluid and nutrition as appropriate  - Assess for signs & symptoms of volume excess or deficit  Outcome: Progressing     Problem: PAIN - ADULT  Goal: Verbalizes/displays adequate comfort level or baseline comfort level  Description  Interventions:  - Encourage patient to monitor pain and request assistance  - Assess pain using appropriate pain scale  - Administer analgesics based on type and severity of pain and evaluate response  - Implement non-pharmacological measures as appropriate and evaluate response  - Consider cultural and social influences on pain and pain management  - Notify physician/advanced practitioner if interventions unsuccessful or patient reports new pain  Outcome: Progressing     Problem: SAFETY ADULT  Goal: Patient will remain free of falls  Description  INTERVENTIONS:  - Assess patient frequently for physical needs  -  Identify cognitive and physical deficits and behaviors that affect risk of falls    -  [place] fall precautions as indicated by assessment   - Educate patient/family on patient safety including physical limitations  - Instruct patient to call for assistance with activity based on assessment  - Modify environment to reduce risk of injury  - Consider OT/PT consult to assist with strengthening/mobility  Outcome: Progressing     Problem: Knowledge Deficit  Goal: Patient/family/caregiver demonstrates understanding of disease process, treatment plan, medications, and discharge instructions  Description  Complete learning assessment and assess knowledge base    Interventions:  - Provide teaching at level of understanding  - Provide teaching via preferred learning methods  Outcome: Progressing

## 2020-06-27 NOTE — ASSESSMENT & PLAN NOTE
Presents after a syncopal event which occurred when she went from a sitting position to a standing position  Suspect secondary to orthostatic hypotension  CT head- no acute intracranial abnormality  Initial trop <0 02  Trend x3  Recent TSH within normal limits  Hypokalemic with potassium of 2 6  Continue to replete  Found to be orthostatic here:  Lying blood pressure 146/72, standing blood pressure 106/52  Continue with gentle IV fluids for hydration    Neuro exam intact on admission  Monitor on telemetry

## 2020-06-27 NOTE — H&P
H&P- Margarita Maki 1960, 62 y o  female MRN: [id number]    Unit/Bed#: [address] 203-01 Encounter: [id number]    Primary Care Provider: Mannie Sutton DO   Date and time admitted to hospital: 6/27/2020 12:25 PM        * Syncope  Assessment & Plan  Presents after a syncopal event which occurred when she went from a sitting position to a standing position  Suspect secondary to orthostatic hypotension  CT head- no acute intracranial abnormality  Initial trop <0 02  Trend x3  Recent TSH within normal limits  Hypokalemic with potassium of 2 6  Continue to replete  Found to be orthostatic here:  Lying blood pressure 146/72, standing blood pressure 106/52  Continue with gentle IV fluids for hydration  Neuro exam intact on admission  Monitor on telemetry    Hypokalemia  Assessment & Plan  Presenting with potassium of 2 6  Likely secondary to poor oral intake and history of gastric bypass 2015  Received 60 mEq potassium thus far  Continue replete and recheck in a m  Monitor on telemetry    PE (pulmonary thromboembolism) ([facility] [address] )  Assessment & Plan  Hx of PE/DVT anticoagulated on eliquis    History of TIAs  Assessment & Plan  Maintained on Eliquis  Migraine  Assessment & Plan  Hx of migraines with high utilizer plan  Currently migraine free    Bipolar depression ([facility] [address] )  Assessment & Plan  Continue home Abilify, Luvox, Ativan, zonogram, gabapentin      VTE Prophylaxis: Apixaban (Eliquis)  / sequential compression device   Code Status: full code  Anticipated Length of Stay:  Patient will be admitted on an Observation basis with an anticipated length of stay of  Less than 2 midnights   Justification for Hospital Stay: syncope with need for further evaluation / hypokalemia with need for further electrolyte repletion    Chief Complaint:   Syncope    History of Present Illness:    Margarita Maki is a 62 y o  female with a PMH of bipolar disorder, depression Andrés's disease, fibromyalgia, gastrectomy 2015, chronic back pain, prior DVT/PEs  She presents after a syncopal episoide  Patient was sitting on the edge of her bed and went to stand up  She reports feeling dizzy and then falling over  Denies any preceding shortness of breath, diaphoresis, or chest pain  Fall was witnessed by her  who states patient hit her head on a nearby table  She is anticoagulated on Eliquis however CT head performed in the ER without any bleed  Patient states she is extremely nauseous at present  She attempt to stay hydrated at however did not eat much today  Lives at home with   Uses assistive devices ambulate  Denies alcohol consumption or smoking  Currently without any chest pain, chest pressure palpitations  No fevers or chills  Currently without headache/migraine  Review of Systems:    General:   No Fever or chills;    EENT:   No ear pain, facial swelling;    Skin:   No rashes, color changes  Respiratory:     No shortness of breath, cough, wheezing, stridor  Cardiovascular:     No chest pain, palpitations  Gastrointestinal:    + nausea, vomiting, diarrhea; No abdominal pain  Musculoskeletal:     No arthralgias, myalgias, swelling  Neurologic:   No dizziness, numbness, weakness  No speech difficulties  Psych:   No agitation, suicidal ideations      Otherwise, All other twelve-point review of systems normal      Past Medical and Surgical History:     Past Medical History:   Diagnosis Date    Adrenal insufficiency ([place]'s disease) ([facility] [address] )     Bipolar disorder     C  difficile diarrhea     Cervical radiculopathy     Chronic back pain     DVT, lower extremity ([facility] [address] ) 1991    Fibromyalgia     History of TIAs     cannot remember details    Hypertension     Hypokalemia     Migraine     MRSA (methicillin resistant Staphylococcus aureus) 07/20/2012    nasal swab negative 4/5/19    Psychiatric disorder     Anxiety, major depression, bipolar    Spinal stenosis     Syncope 2014 orthostatic hypotension       Past Surgical History:   Procedure Laterality Date    APPENDECTOMY      GASTRIC RESTRICTION SURGERY      Gastric Sleeve 2015    HYSTERECTOMY      JOINT REPLACEMENT      Left Knee  and Right Hip        Meds/Allergies:    Current Facility-Administered Medications   Medication Dose Route Frequency Provider Last Rate Last Dose    heparin (porcine) subcutaneous injection 5,000 Units  5,000 Units Subcutaneous Q8H [address] Sandy Alas PA-C        metoclopramide (REGLAN) injection 10 mg  10 mg Intravenous Once Alberto Nguyen PA-C        ondansetron (ZOFRAN) injection 4 mg  4 mg Intravenous Q6H PRN Sandy Alas PA-C           Allergies   Allergen Reactions    Ketorolac Itching and Other (See Comments)     [toradol] Itching, hives    Mushroom Extract Complex        Allergies: Allergies   Allergen Reactions    Ketorolac Itching and Other (See Comments)     [toradol] Itching, hives    Mushroom Extract Complex        Social History:     Marital Status: /Civil Union     Substance Use History:   Social History     Substance and Sexual Activity   Alcohol Use Not Currently    Frequency: Never    Binge frequency: Never     Social History     Tobacco Use   Smoking Status Former Smoker    Packs/day: 0 20    Types: Cigarettes    Last attempt to quit:     Years since quittin 4   Smokeless Tobacco Never Used     Social History     Substance and Sexual Activity   Drug Use Never       Family History:    non-contributory    Physical Exam:     Vitals:   Blood Pressure: 108/56 (20 171)  Pulse: 86 (20 1706)  Temperature: 98 6 °F (37 °C) (20 1652)  Temp Source: Temporal (20 1223)  Respirations: 18 (20 1627)  Height: 5' 8" (172 7 cm) (20 171)  Weight - Scale: 107 kg (236 lb 5 3 oz) (20 171)  SpO2: 95 % (20 1706)    Physical Exam   Constitutional: She is oriented to person, place, and time   She appears well-developed and well-nourished  No distress  HENT:   Head: Normocephalic and atraumatic  Eyes: No scleral icterus  Neck: No tracheal deviation present  Cardiovascular: Normal rate, regular rhythm and normal heart sounds  Pulmonary/Chest: Effort normal and breath sounds normal  No stridor  No respiratory distress  She has no wheezes  She has no rales  She exhibits no tenderness  Abdominal: She exhibits no distension and no mass  There is no tenderness  There is no rebound and no guarding  No hernia  Musculoskeletal: She exhibits no deformity  Neurological: She is alert and oriented to person, place, and time  Equal upper motor and lower motor strength bilaterally  No sensory deficit present  Fine motor coordination skills intact  Equal plantar flexion and dorsiflexion bilaterally  Skin: Skin is warm and dry  She is not diaphoretic  Psychiatric: She has a normal mood and affect  Her behavior is normal  Judgment and thought content normal        Additional Data:     Lab Results: I have personally reviewed pertinent reports  Results from last 7 days   Lab Units 06/27/20  1843 06/27/20  1400   WBC Thousand/uL  --  8 73   HEMOGLOBIN g/dL  --  13 6   HEMATOCRIT %  --  41 2   PLATELETS Thousands/uL 296 230   NEUTROS PCT %  --  65   LYMPHS PCT %  --  24   MONOS PCT %  --  8   EOS PCT %  --  1     Results from last 7 days   Lab Units 06/27/20  1400   POTASSIUM mmol/L 2 6*   CHLORIDE mmol/L 103   CO2 mmol/L 36*   BUN mg/dL 11   CREATININE mg/dL 0 81   CALCIUM mg/dL 8 9           Imaging: I have personally reviewed pertinent reports  Xr Chest 2 Views    Result Date: 6/27/2020  Narrative: CHEST INDICATION:   Fall, right sided rib pain  COMPARISON:  Chest radiograph from 10/15/2019 and chest CT from 7/22/2018  EXAM PERFORMED/VIEWS:  XR CHEST PA & LATERAL  DUAL ENERGY SUBTRACTION TECHNIQUE FINDINGS: Cardiomediastinal silhouette appears unremarkable  No acute disease  No effusion or pneumothorax    Linear scar in the left base  Osseous structures appear within normal limits for patient age  Impression: No acute cardiopulmonary disease  No displaced right rib fracture or pneumothorax  Workstation performed: [id number]     Ct Head Without Contrast    Result Date: 6/27/2020  Narrative: CT BRAIN - WITHOUT CONTRAST INDICATION:   Fall, Syncope, head injury on Eliquis  COMPARISON:  10/15/2019  TECHNIQUE:  CT examination of the brain was performed  In addition to axial images, coronal 2D reformatted images were created and submitted for interpretation  Radiation dose length product (DLP) for this visit:  885 mGy-cm   This examination, like all CT scans performed in the [facility], was performed utilizing techniques to minimize radiation dose exposure, including the use of iterative reconstruction and automated exposure control  IMAGE QUALITY:  Diagnostic  FINDINGS: PARENCHYMA:  No intracranial mass, mass effect or midline shift  No CT signs of acute infarction  No acute parenchymal hemorrhage  VENTRICLES AND EXTRA-AXIAL SPACES:  Normal for the patient's age  VISUALIZED ORBITS AND PARANASAL SINUSES:  Unremarkable with lens resection on the right  [place] AND EXTRACRANIAL SOFT TISSUES:  Normal      Impression: No acute intracranial abnormality  Workstation performed: [id number]       EKG, Pathology, and Other Studies Reviewed on Admission:   · EKG:  Sinus    [employer] Records Reviewed: Yes     Total Time for Visit, including Counseling / Coordination of Care: 45 minutes  Greater than 50% of this total time spent on direct patient counseling and coordination of care  ** Please Note: This note has been constructed using a voice recognition system   **

## 2020-06-27 NOTE — ED NOTES
LELE Almonte at bedside with ultrasound trying to get blood work from patient      Davon Chavez, LELE  06/27/20 [id number]

## 2020-06-27 NOTE — ED NOTES
Charge nurse attempted IV  Unsuccessful  Charge is currently looking for a nurse to attempt IV with ultrasound       Calin Chang, RN  06/27/20 [id number]

## 2020-06-27 NOTE — ED PROVIDER NOTES
History  Chief Complaint   Patient presents with    Syncope     patient reports about 30 minutes ago becoming faint and fell backwards and struck her head on a coffee table, history of syncope  Taking elequis  c/o headache, nausea  History provided by:  Patient   used: No    Syncope   Episode history:  Single  Most recent episode: Today  Duration:  2 minutes  Timing:  Intermittent  Progression:  Waxing and waning  Chronicity:  New  Context: standing up    Witnessed: no    Relieved by:  Nothing  Worsened by:  Nothing  Ineffective treatments:  None tried  Associated symptoms: no chest pain, no difficulty breathing, no dizziness, no fever, no focal weakness, no headaches, no nausea, no shortness of breath and no vomiting    Risk factors comment:  Fibromyalgia, Migraines, Syncope      Prior to Admission Medications   Prescriptions Last Dose Informant Patient Reported? Taking? ARIPiprazole (ABILIFY) 15 mg tablet   No No   Sig: Take 0 5 tablets (7 5 mg total) by mouth daily   Erenumab-aooe (AIMOVIG) 140 MG/ML SOAJ   Yes No   Sig: Inject 140 mg under the skin every 30 (thirty) days    LORazepam (ATIVAN) 1 mg tablet   Yes No   Sig: Take 2 mg by mouth 3 (three) times a day as needed    acetaminophen (TYLENOL) 500 mg tablet   No No   Sig: Take 1 tablet (500 mg total) by mouth every 6 (six) hours as needed (pain)   apixaban (ELIQUIS) 5 mg   Yes No   Sig: Take 5 mg by mouth 2 (two) times a day   cholecalciferol (VITAMIN D3) 1,000 units tablet   Yes No   Sig: Take 5,000 Units by mouth daily   eszopiclone (LUNESTA) 3 MG tablet   Yes No   Sig: Take 3 mg by mouth daily   ferrous sulfate 325 (65 Fe) mg tablet   Yes No   Sig: Take 325 mg by mouth daily with breakfast   fluvoxaMINE (LUVOX) 50 mg tablet   No No   Sig: Take 3 tablets (150 mg total) by mouth daily at bedtime   gabapentin (NEURONTIN) 600 MG tablet   Yes No   Sig: Take 600 mg by mouth 3 (three) times a day     lamoTRIgine (LaMICtal) 200 MG tablet   Yes No   Sig: Take 200 mg by mouth daily  meclizine (ANTIVERT) 12 5 MG tablet   No No   Sig: Take 1 tablet (12 5 mg total) by mouth every 8 (eight) hours as needed for dizziness   mexiletine (MEXITIL) 150 mg capsule   Yes No   Sig: Take 150 mg by mouth 3 (three) times a day   ondansetron (ZOFRAN) 4 mg tablet   No No   Sig: Take 1 tablet (4 mg total) by mouth every 8 (eight) hours as needed for nausea or vomiting   zonisamide (ZONEGRAN) 100 mg capsule   Yes No   Sig: Take 200 mg by mouth daily      Facility-Administered Medications: None       Past Medical History:   Diagnosis Date    Adrenal insufficiency (Andrés's disease) ([facility] [address] )     Bipolar disorder     Cervical radiculopathy     Chronic back pain     [facility]     Fibromyalgia     History of TIAs     cannot remember details    Hypertension     Hypokalemia     Migraine     MRSA (methicillin resistant Staphylococcus aureus) 2012    nasal swab negative 19    Psychiatric disorder     Anxiety, major depression, bipolar    Spinal stenosis     Syncope     orthostatic hypotension       Past Surgical History:   Procedure Laterality Date    APPENDECTOMY      GASTRIC RESTRICTION SURGERY      Gastric Sleeve 2015    HYSTERECTOMY      JOINT REPLACEMENT      Left Knee  and Right Hip        Family History   Problem Relation Age of Onset    Breast cancer Mother     Migraines Mother     Arthritis Mother     Kidney disease Father     Heart disease Father     Diabetes Father     Arthritis Father     Brain cancer Brother     Seizures Brother      I have reviewed and agree with the history as documented      E-Cigarette/Vaping     E-Cigarette/Vaping Substances     Social History     Tobacco Use    Smoking status: Former Smoker     Packs/day: 0 20     Types: Cigarettes     Last attempt to quit:      Years since quittin 4    Smokeless tobacco: Never Used   Substance Use Topics    Alcohol use: Not Currently     Frequency: Never     Binge frequency: Never    Drug use: Never       Review of Systems   Constitutional: Negative for chills and fever  HENT: Negative for facial swelling, sore throat and trouble swallowing  Eyes: Negative for pain and visual disturbance  Respiratory: Negative for cough and shortness of breath  Cardiovascular: Positive for syncope  Negative for chest pain and leg swelling  Gastrointestinal: Negative for abdominal pain, blood in stool, diarrhea, nausea and vomiting  Genitourinary: Negative for dysuria and flank pain  Musculoskeletal: Negative for back pain, neck pain and neck stiffness  Skin: Negative for pallor and rash  Allergic/Immunologic: Negative for environmental allergies and immunocompromised state  Neurological: Negative for dizziness, focal weakness and headaches  Hematological: Negative for adenopathy  Does not bruise/bleed easily  Psychiatric/Behavioral: Negative for agitation and behavioral problems  All other systems reviewed and are negative  Physical Exam  Physical Exam   Constitutional: She is oriented to person, place, and time  She appears well-developed and well-nourished  No distress  HENT:   Head: Normocephalic and atraumatic  Eyes: EOM are normal    Neck: Normal range of motion  Neck supple  Cardiovascular: Normal rate, regular rhythm, normal heart sounds and intact distal pulses  Pulmonary/Chest: Effort normal and breath sounds normal    Abdominal: Soft  Bowel sounds are normal  There is no tenderness  There is no rebound and no guarding  Musculoskeletal: Normal range of motion  Neurological: She is alert and oriented to person, place, and time  No cranial nerve deficit  Coordination normal    Skin: Skin is warm and dry  Psychiatric: She has a normal mood and affect  Nursing note and vitals reviewed        Vital Signs  ED Triage Vitals [06/27/20 1223]   Temperature Pulse Respirations Blood Pressure SpO2   (!) 97 3 °F (36 3 °C) 83 18 150/68 97 %      Temp Source Heart Rate Source Patient Position - Orthostatic VS BP Location FiO2 (%)   Temporal Monitor Sitting Left arm --      Pain Score       9           Vitals:    06/27/20 1223 06/27/20 1438 06/27/20 1553 06/27/20 1627   BP: 150/68 126/71 121/83    Pulse: 83 74 74 74   Patient Position - Orthostatic VS: Sitting Lying  Lying         Visual Acuity      ED Medications  Medications   potassium chloride 20 mEq IVPB (premix) (20 mEq Intravenous New Bag 6/27/20 1544)   acetaminophen (TYLENOL) tablet 650 mg (650 mg Oral Given 6/27/20 1400)   potassium chloride (K-DUR,KLOR-CON) CR tablet 40 mEq (40 mEq Oral Given 6/27/20 1527)   sodium chloride 0 9 % bolus 500 mL (500 mL Intravenous New Bag 6/27/20 1542)   ondansetron (ZOFRAN-ODT) dispersible tablet 4 mg (4 mg Oral Given 6/27/20 1527)   ibuprofen (MOTRIN) tablet 400 mg (400 mg Oral Given 6/27/20 1531)       Diagnostic Studies  Results Reviewed     Procedure Component Value Units Date/Time    Troponin I [[id number]]  (Normal) Collected:  06/27/20 1400    Lab Status:  Final result Specimen:  Blood from Arm, Right Updated:  06/27/20 1430     Troponin I <0 02 ng/mL     Basic metabolic panel [[id number]]  (Abnormal) Collected:  06/27/20 1400    Lab Status:  Final result Specimen:  Blood from Arm, Right Updated:  06/27/20 1422     Sodium 145 mmol/L      Potassium 2 6 mmol/L      Chloride 103 mmol/L      CO2 36 mmol/L      ANION GAP 6 mmol/L      BUN 11 mg/dL      Creatinine 0 81 mg/dL      Glucose 90 mg/dL      Calcium 8 9 mg/dL      eGFR 79 ml/min/1 73sq m     Narrative:       Rudi guidelines for Chronic Kidney Disease (CKD):     Stage 1 with normal or high GFR (GFR > 90 mL/min/1 73 square meters)    Stage 2 Mild CKD (GFR = 60-89 mL/min/1 73 square meters)    Stage 3A Moderate CKD (GFR = 45-59 mL/min/1 73 square meters)    Stage 3B Moderate CKD (GFR = 30-44 mL/min/1 73 square meters)    Stage 4 Severe CKD (GFR = 15-29 mL/min/1 73 square meters)    Stage 5 End Stage CKD (GFR <15 mL/min/1 73 square meters)  Note: GFR calculation is accurate only with a steady state creatinine    CBC and differential [[id number]] Collected:  06/27/20 1400    Lab Status:  Final result Specimen:  Blood from Arm, Right Updated:  06/27/20 1413     WBC 8 73 Thousand/uL      RBC 4 21 Million/uL      Hemoglobin 13 6 g/dL      Hematocrit 41 2 %      MCV 98 fL      MCH 32 3 pg      MCHC 33 0 g/dL      RDW 14 1 %      MPV 11 0 fL      Platelets 901 Thousands/uL      nRBC 0 /100 WBCs      Neutrophils Relative 65 %      Immat GRANS % 1 %      Lymphocytes Relative 24 %      Monocytes Relative 8 %      Eosinophils Relative 1 %      Basophils Relative 1 %      Neutrophils Absolute 5 73 Thousands/µL      Immature Grans Absolute 0 05 Thousand/uL      Lymphocytes Absolute 2 05 Thousands/µL      Monocytes Absolute 0 72 Thousand/µL      Eosinophils Absolute 0 11 Thousand/µL      Basophils Absolute 0 07 Thousands/µL                  XR chest 2 views   Final Result by Pierre Allison MD (06/27 1434)      No acute cardiopulmonary disease  No displaced right rib fracture or pneumothorax  Workstation performed: [id number]         CT head without contrast   Final Result by Bren Jiang MD (06/27 1328)      No acute intracranial abnormality                    Workstation performed: [id number]                    Procedures  ECG 12 Lead Documentation Only  Date/Time: 6/27/2020 1:02 PM  Performed by: Stacie Sibley MD  Authorized by: Stacie Sibley MD     Indications / Diagnosis:  Syncope  ECG reviewed by me, the ED Provider: yes    Patient location:  ED  Interpretation:     Interpretation: normal    Rate:     ECG rate assessment: normal    Rhythm:     Rhythm: sinus rhythm    Ectopy:     Ectopy: none    QRS:     QRS axis:  Normal  Conduction:     Conduction: normal    ST segments:     ST segments:  Normal  T waves:     T waves: normal               ED Course  ED Course as of Jun 27 1648   Sat Jun 27, 2020   1331 CT head does not show any acute intracranial abnormality  1433 Sodium: 145   1433 Potassium(!!): 2 6   1433 Anion Gap: 6   1433 BUN: 11   1433 BMP noted for hypokalemia, we will replace with oral potassium, difficult iv access, will try to establish again and give iv potassium as well  Creatinine: 0 81   1604 IV established, IV KCL started  We will admit for syncope, hypokalemia  MDM  Number of Diagnoses or Management Options  Fall, initial encounter: new and requires workup  Injury of head, initial encounter: new and requires workup  Muscle strain of chest wall, initial encounter: new and requires workup  Syncope: new and requires workup  Diagnosis management comments: Patient is a 56-year-old female, history of migraines, fibromyalgia, syncope in the past, comes in with complaints of an episode of syncope, which is described by the patient as she was standing and then she suddenly fell backward, hit the back of her head onto the table, also hit right side of the body, passed out for a minute or so according to her , no seizure activity noted, no recent illness, no prior symptoms to the fall/syncope, denies headache, chest pain, palpitations, fever, cough or any other symptoms, no neck pain, no arm or leg numbness/tingling  On exam, patient is conscious, alert, walked to the bathroom, pupils equal round reactive to light, no cranial nerve or focal neuro deficits, no respiratory distress, no rib tenderness, no bruising bilateral equal pulses  Impression:  Syncope, fall, rule out intracranial injury, right-sided rib pain, chest wall strain, will check basic labs, EKG, CT head, chest x-ray         Amount and/or Complexity of Data Reviewed  Clinical lab tests: reviewed and ordered  Tests in the radiology section of CPT®: ordered and reviewed  Tests in the medicine section of CPT®: ordered and reviewed  Independent visualization of images, tracings, or specimens: yes          Disposition  Final diagnoses:   Syncope   Fall, initial encounter   Injury of head, initial encounter   Muscle strain of chest wall, initial encounter     Time reflects when diagnosis was documented in both MDM as applicable and the Disposition within this note     Time User Action Codes Description Comment    6/27/2020  1:06 PM Divine Helling Add [R55] Syncope     6/27/2020  1:06 PM Divine Helling Add Valerius Plank  AOHO] Fall, initial encounter     6/27/2020  1:06 PM Divine Helling Add [I77 33RI] Injury of head, initial encounter     6/27/2020  1:06 PM [place] Helling Add [M71 601D] Muscle strain of chest wall, initial encounter       ED Disposition     ED Disposition Condition Date/Time Comment    Admit Stable Sat Jun 27, 2020  4:12 PM Case was discussed with Beto Frias and the patient's admission status was agreed to be Admission Status: observation status to the service of Dr Torin Mccall  Follow-up Information    None         Patient's Medications   Discharge Prescriptions    No medications on file     No discharge procedures on file      PDMP Review       Value Time User    PDMP Reviewed  Yes 10/2/2019  4:27 PM Aaliyah Mcknight MD          ED Provider  Electronically Signed by           Rex Guevara MD  06/27/20 7602

## 2020-06-28 LAB
ANION GAP SERPL CALCULATED.3IONS-SCNC: 6 MMOL/L (ref 4–13)
ATRIAL RATE: 75 BPM
BUN SERPL-MCNC: 12 MG/DL (ref 5–25)
CALCIUM SERPL-MCNC: 8.7 MG/DL (ref 8.3–10.1)
CHLORIDE SERPL-SCNC: 109 MMOL/L (ref 100–108)
CO2 SERPL-SCNC: 33 MMOL/L (ref 21–32)
CREAT SERPL-MCNC: 0.96 MG/DL (ref 0.6–1.3)
GFR SERPL CREATININE-BSD FRML MDRD: 64 ML/MIN/1.73SQ M
GLUCOSE P FAST SERPL-MCNC: 93 MG/DL (ref 65–99)
GLUCOSE SERPL-MCNC: 93 MG/DL (ref 65–140)
MAGNESIUM SERPL-MCNC: 2.1 MG/DL (ref 1.6–2.6)
P AXIS: 28 DEGREES
POTASSIUM SERPL-SCNC: 2.4 MMOL/L (ref 3.5–5.3)
POTASSIUM SERPL-SCNC: 3 MMOL/L (ref 3.5–5.3)
PR INTERVAL: 158 MS
QRS AXIS: -19 DEGREES
QRSD INTERVAL: 90 MS
QT INTERVAL: 422 MS
QTC INTERVAL: 471 MS
SODIUM SERPL-SCNC: 148 MMOL/L (ref 136–145)
T WAVE AXIS: -15 DEGREES
VENTRICULAR RATE: 75 BPM

## 2020-06-28 PROCEDURE — 99232 SBSQ HOSP IP/OBS MODERATE 35: CPT | Performed by: FAMILY MEDICINE

## 2020-06-28 PROCEDURE — 84132 ASSAY OF SERUM POTASSIUM: CPT | Performed by: FAMILY MEDICINE

## 2020-06-28 PROCEDURE — 93010 ELECTROCARDIOGRAM REPORT: CPT | Performed by: INTERNAL MEDICINE

## 2020-06-28 PROCEDURE — 80048 BASIC METABOLIC PNL TOTAL CA: CPT | Performed by: PHYSICIAN ASSISTANT

## 2020-06-28 PROCEDURE — 83735 ASSAY OF MAGNESIUM: CPT | Performed by: PHYSICIAN ASSISTANT

## 2020-06-28 RX ORDER — POTASSIUM CHLORIDE 14.9 MG/ML
20 INJECTION INTRAVENOUS
Status: COMPLETED | OUTPATIENT
Start: 2020-06-28 | End: 2020-06-28

## 2020-06-28 RX ORDER — POTASSIUM CHLORIDE 29.8 MG/ML
40 INJECTION INTRAVENOUS EVERY 4 HOURS
Status: DISCONTINUED | OUTPATIENT
Start: 2020-06-28 | End: 2020-06-28 | Stop reason: SDUPTHER

## 2020-06-28 RX ORDER — POTASSIUM CHLORIDE 20 MEQ/1
40 TABLET, EXTENDED RELEASE ORAL ONCE
Status: COMPLETED | OUTPATIENT
Start: 2020-06-28 | End: 2020-06-28

## 2020-06-28 RX ORDER — METOCLOPRAMIDE HYDROCHLORIDE 5 MG/ML
10 INJECTION INTRAMUSCULAR; INTRAVENOUS ONCE
Status: COMPLETED | OUTPATIENT
Start: 2020-06-28 | End: 2020-06-28

## 2020-06-28 RX ORDER — ACETAMINOPHEN 325 MG/1
650 TABLET ORAL EVERY 6 HOURS PRN
Status: DISCONTINUED | OUTPATIENT
Start: 2020-06-28 | End: 2020-07-01 | Stop reason: HOSPADM

## 2020-06-28 RX ORDER — POTASSIUM CHLORIDE 14.9 MG/ML
20 INJECTION INTRAVENOUS
Status: DISCONTINUED | OUTPATIENT
Start: 2020-06-28 | End: 2020-06-28

## 2020-06-28 RX ORDER — KETOROLAC TROMETHAMINE 30 MG/ML
15 INJECTION, SOLUTION INTRAMUSCULAR; INTRAVENOUS ONCE
Status: COMPLETED | OUTPATIENT
Start: 2020-06-28 | End: 2020-06-28

## 2020-06-28 RX ORDER — DIPHENHYDRAMINE HYDROCHLORIDE 50 MG/ML
25 INJECTION INTRAMUSCULAR; INTRAVENOUS ONCE
Status: COMPLETED | OUTPATIENT
Start: 2020-06-28 | End: 2020-06-28

## 2020-06-28 RX ORDER — ACETAMINOPHEN 325 MG/1
650 TABLET ORAL ONCE
Status: COMPLETED | OUTPATIENT
Start: 2020-06-28 | End: 2020-06-28

## 2020-06-28 RX ORDER — SUMATRIPTAN 50 MG/1
50 TABLET, FILM COATED ORAL ONCE
Status: COMPLETED | OUTPATIENT
Start: 2020-06-28 | End: 2020-06-28

## 2020-06-28 RX ADMIN — POTASSIUM CHLORIDE 20 MEQ: 200 INJECTION, SOLUTION INTRAVENOUS at 15:07

## 2020-06-28 RX ADMIN — ACETAMINOPHEN 650 MG: 325 TABLET ORAL at 15:10

## 2020-06-28 RX ADMIN — POTASSIUM CHLORIDE 20 MEQ: 14.9 INJECTION, SOLUTION INTRAVENOUS at 07:22

## 2020-06-28 RX ADMIN — MEXILETINE HYDROCHLORIDE 150 MG: 150 CAPSULE ORAL at 15:10

## 2020-06-28 RX ADMIN — MEXILETINE HYDROCHLORIDE 150 MG: 150 CAPSULE ORAL at 08:34

## 2020-06-28 RX ADMIN — FLUVOXAMINE MALEATE 300 MG: 50 TABLET ORAL at 21:30

## 2020-06-28 RX ADMIN — DIPHENHYDRAMINE HYDROCHLORIDE 25 MG: 50 INJECTION, SOLUTION INTRAMUSCULAR; INTRAVENOUS at 12:46

## 2020-06-28 RX ADMIN — POTASSIUM CHLORIDE 20 MEQ: 200 INJECTION, SOLUTION INTRAVENOUS at 11:42

## 2020-06-28 RX ADMIN — ONDANSETRON 4 MG: 2 INJECTION INTRAMUSCULAR; INTRAVENOUS at 22:22

## 2020-06-28 RX ADMIN — KETOROLAC TROMETHAMINE 15 MG: 30 INJECTION, SOLUTION INTRAMUSCULAR at 21:29

## 2020-06-28 RX ADMIN — KETOROLAC TROMETHAMINE 15 MG: 30 INJECTION, SOLUTION INTRAMUSCULAR at 12:47

## 2020-06-28 RX ADMIN — LORAZEPAM 2 MG: 1 TABLET ORAL at 08:34

## 2020-06-28 RX ADMIN — POTASSIUM CHLORIDE 20 MEQ: 14.9 INJECTION, SOLUTION INTRAVENOUS at 10:02

## 2020-06-28 RX ADMIN — METOCLOPRAMIDE 10 MG: 5 INJECTION, SOLUTION INTRAMUSCULAR; INTRAVENOUS at 21:30

## 2020-06-28 RX ADMIN — APIXABAN 5 MG: 5 TABLET, FILM COATED ORAL at 08:36

## 2020-06-28 RX ADMIN — ACETAMINOPHEN 650 MG: 325 TABLET ORAL at 10:05

## 2020-06-28 RX ADMIN — APIXABAN 5 MG: 5 TABLET, FILM COATED ORAL at 17:42

## 2020-06-28 RX ADMIN — ZONISAMIDE 200 MG: 100 CAPSULE ORAL at 08:35

## 2020-06-28 RX ADMIN — ONDANSETRON 4 MG: 2 INJECTION INTRAMUSCULAR; INTRAVENOUS at 15:13

## 2020-06-28 RX ADMIN — GABAPENTIN 600 MG: 300 CAPSULE ORAL at 08:34

## 2020-06-28 RX ADMIN — POTASSIUM CHLORIDE AND SODIUM CHLORIDE 75 ML/HR: 900; 300 INJECTION, SOLUTION INTRAVENOUS at 12:45

## 2020-06-28 RX ADMIN — ONDANSETRON 4 MG: 2 INJECTION INTRAMUSCULAR; INTRAVENOUS at 08:31

## 2020-06-28 RX ADMIN — ARIPIPRAZOLE 15 MG: 5 TABLET ORAL at 08:34

## 2020-06-28 RX ADMIN — POTASSIUM CHLORIDE 20 MEQ: 200 INJECTION, SOLUTION INTRAVENOUS at 17:42

## 2020-06-28 RX ADMIN — LAMOTRIGINE 200 MG: 100 TABLET ORAL at 08:35

## 2020-06-28 RX ADMIN — ONDANSETRON 4 MG: 2 INJECTION INTRAMUSCULAR; INTRAVENOUS at 02:10

## 2020-06-28 RX ADMIN — GABAPENTIN 600 MG: 300 CAPSULE ORAL at 15:10

## 2020-06-28 RX ADMIN — GABAPENTIN 600 MG: 300 CAPSULE ORAL at 20:38

## 2020-06-28 RX ADMIN — POTASSIUM CHLORIDE 40 MEQ: 1500 TABLET, EXTENDED RELEASE ORAL at 08:36

## 2020-06-28 RX ADMIN — POTASSIUM CHLORIDE 20 MEQ: 200 INJECTION, SOLUTION INTRAVENOUS at 20:26

## 2020-06-28 RX ADMIN — SUMATRIPTAN SUCCINATE 50 MG: 50 TABLET ORAL at 02:53

## 2020-06-28 RX ADMIN — LORAZEPAM 2 MG: 1 TABLET ORAL at 17:42

## 2020-06-28 RX ADMIN — LORAZEPAM 4 MG: 1 TABLET ORAL at 21:30

## 2020-06-28 RX ADMIN — MEXILETINE HYDROCHLORIDE 150 MG: 150 CAPSULE ORAL at 20:38

## 2020-06-28 NOTE — PROGRESS NOTES
Progress Note - Norma Brannon 1960, 62 y o  female MRN: [id number]    Unit/Bed#: [place] [address] -01 Encounter: [id number]    Primary Care Provider: Joselin Morfin DO   Date and time admitted to hospital: 6/27/2020 12:25 PM        * Syncope  Assessment & Plan  Presents after a syncopal event which occurred when she went from a sitting position to a standing position  Suspect secondary to orthostatic hypotension  CT head- no acute intracranial abnormality  Initial trop <0 02  Trend x3  Recent TSH within normal limits  Hypokalemic with potassium of 2 6  Continue to replete  Found to be orthostatic here:  Lying blood pressure 146/72, standing blood pressure 106/52  Continue with gentle IV fluids for hydration  Monitor on telemetry  Discharging planning tomorrow after electrolytes replacement     PE (pulmonary thromboembolism) ([facility] [address] )  Assessment & Plan  Hx of PE/DVT anticoagulated on eliquis    History of TIAs  Assessment & Plan  Maintained on Eliquis  Migraine  Assessment & Plan  Hx of migraines with high utilizer plan  Tylenol     Hypokalemia  Assessment & Plan  Presenting with potassium of 2 6  Current deficit of 114 of K   Likely secondary to poor oral intake and history of gastric bypass 2015  Received 60 mEq potassium thus far  Continue replete and recheck in a m  Monitor on telemetry    Bipolar depression ([facility] )  Assessment & Plan  Continue home Abilify, Luvox, Ativan, zonogram, gabapentin        VTE Pharmacologic Prophylaxis:   Pharmacologic: Apixaban (Eliquis)  Mechanical VTE Prophylaxis in Place: Yes    Patient Centered Rounds: I have performed bedside rounds with nursing staff today  Discussions with Specialists or Other Care Team Provider:     Education and Discussions with Family / Patient: patient    Time Spent for Care: 20 minutes  More than 50% of total time spent on counseling and coordination of care as described above      Current Length of Stay: 0 day(s)    Current Patient Status: Inpatient   Certification Statement: The patient will continue to require additional inpatient hospital stay due to acute above condtions    Discharge Plan: likely tomorrow    Code Status: Level 1 - Full Code      Subjective:   Patient is complaining headache  No more episode of near syncope    Objective:     Vitals:   Temp (24hrs), Av 2 °F (36 8 °C), Min:97 3 °F (36 3 °C), Max:98 7 °F (37 1 °C)    Temp:  [97 3 °F (36 3 °C)-98 7 °F (37 1 °C)] 98 3 °F (36 8 °C)  HR:  [68-90] 83  Resp:  [18] 18  BP: ()/(55-85) 125/84  SpO2:  [91 %-97 %] 93 %  Body mass index is 35 93 kg/m²  Input and Output Summary (last 24 hours):     No intake or output data in the 24 hours ending 20 1035    Physical Exam:     Physical Exam   Constitutional: No distress  Cardiovascular: Normal heart sounds and intact distal pulses  Exam reveals no friction rub  No murmur heard  Pulmonary/Chest: No stridor  No respiratory distress  She has no wheezes  Abdominal: Soft  Bowel sounds are normal  She exhibits no distension  There is no tenderness  There is no guarding  Musculoskeletal: Normal range of motion  She exhibits no edema  Neurological: She is alert  No cranial nerve deficit  Skin: Skin is warm  She is not diaphoretic  Psychiatric: She has a normal mood and affect  Additional Data:     Labs:    Results from last 7 days   Lab Units 20  1843 20  1400   WBC Thousand/uL  --  8 73   HEMOGLOBIN g/dL  --  13 6   HEMATOCRIT %  --  41 2   PLATELETS Thousands/uL 296 230   NEUTROS PCT %  --  65   LYMPHS PCT %  --  24   MONOS PCT %  --  8   EOS PCT %  --  1     Results from last 7 days   Lab Units 20  0518   POTASSIUM mmol/L 2 4*   CHLORIDE mmol/L 109*   CO2 mmol/L 33*   BUN mg/dL 12   CREATININE mg/dL 0 96   CALCIUM mg/dL 8 7           * I Have Reviewed All Lab Data Listed Above  * Additional Pertinent Lab Tests Reviewed:  All Labs Within Last 24 Hours Reviewed    Imaging:    Imaging Reports Reviewed Today Include:   Imaging Personally Reviewed by Myself Includes:      Recent Cultures (last 7 days):           Last 24 Hours Medication List:     Current Facility-Administered Medications:  acetaminophen 650 mg Oral Q6H PRN Kane Cadet MD    apixaban 5 mg Oral BID Sandy Piger, PA-YO    ARIPiprazole 15 mg Oral Daily Sandy Piger, PA-C    fluvoxaMINE 300 mg Oral HS Sandy Piger, PA-C    gabapentin 600 mg Oral TID Donel Books, PA-YO    lamoTRIgine 200 mg Oral Daily Sandy Piger, PA-C    LORazepam 2 mg Oral BID Sandy Piger, PA-C    LORazepam 4 mg Oral HS Sandy Piger, PA-C    mexiletine 150 mg Oral TID Sandy Piger, PA-C    ondansetron 4 mg Intravenous Q6H PRN Sandy Piger, PA-C    potassium chloride 20 mEq Intravenous Q2H Kane Cadet MD    sodium chloride 0 9 % with KCl 40 mEq/L 75 mL/hr Intravenous Continuous Sandy PigerSIRISHA Last Rate: 75 mL/hr (06/27/20 2127)   zonisamide 200 mg Oral Daily Donel SIRISHA Harvey         Today, Patient Was Seen By: Kane Cadet MD    ** Please Note: Dragon 360 Dictation voice to text software may have been used in the creation of this document   **

## 2020-06-28 NOTE — ASSESSMENT & PLAN NOTE
Presents after a syncopal event which occurred when she went from a sitting position to a standing position  Suspect secondary to orthostatic hypotension  CT head- no acute intracranial abnormality  Initial trop <0 02  Trend x3  Recent TSH within normal limits  Hypokalemic with potassium of 2 6  Continue to replete  Found to be orthostatic here:  Lying blood pressure 146/72, standing blood pressure 106/52  Continue with gentle IV fluids for hydration    Monitor on telemetry  Discharging planning tomorrow after electrolytes replacement

## 2020-06-28 NOTE — PLAN OF CARE
Problem: Potential for Falls  Goal: Patient will remain free of falls  Description  INTERVENTIONS:  - Assess patient frequently for physical needs  -  Identify cognitive and physical deficits and behaviors that affect risk of falls    -  [place] fall precautions as indicated by assessment   - Educate patient/family on patient safety including physical limitations  - Instruct patient to call for assistance with activity based on assessment  - Modify environment to reduce risk of injury  - Consider OT/PT consult to assist with strengthening/mobility  Outcome: Progressing     Problem: Prexisting or High Potential for Compromised Skin Integrity  Goal: Skin integrity is maintained or improved  Description  INTERVENTIONS:  - Identify patients at risk for skin breakdown  - Assess and monitor skin integrity  - Assess and monitor nutrition and hydration status  - Monitor labs   - Assess for incontinence   - Turn and reposition patient  - Assist with mobility/ambulation  - Relieve pressure over bony prominences  - Avoid friction and shearing  - Provide appropriate hygiene as needed including keeping skin clean and dry  - Evaluate need for skin moisturizer/barrier cream  - Collaborate with interdisciplinary team   - Patient/family teaching  - Consider wound care consult   Outcome: Progressing     Problem: CARDIOVASCULAR - ADULT  Goal: Maintains optimal cardiac output and hemodynamic stability  Description  INTERVENTIONS:  - Monitor I/O, vital signs and rhythm  - Monitor for S/S and trends of decreased cardiac output  - Administer and titrate ordered vasoactive medications to optimize hemodynamic stability  - Assess quality of pulses, skin color and temperature  - Assess for signs of decreased coronary artery perfusion  - Instruct patient to report change in severity of symptoms  Outcome: Progressing  Goal: Absence of cardiac dysrhythmias or at baseline rhythm  Description  INTERVENTIONS:  - Continuous cardiac monitoring, vital signs, obtain 12 lead EKG if ordered  - Administer antiarrhythmic and heart rate control medications as ordered  - Monitor electrolytes and administer replacement therapy as ordered  Outcome: Progressing     Problem: METABOLIC, FLUID AND ELECTROLYTES - ADULT  Goal: Electrolytes maintained within normal limits  Description  INTERVENTIONS:  - Monitor labs and assess patient for signs and symptoms of electrolyte imbalances  - Administer electrolyte replacement as ordered  - Monitor response to electrolyte replacements, including repeat lab results as appropriate  - Instruct patient on fluid and nutrition as appropriate  Outcome: Progressing  Goal: Fluid balance maintained  Description  INTERVENTIONS:  - Monitor labs   - Monitor I/O and WT  - Instruct patient on fluid and nutrition as appropriate  - Assess for signs & symptoms of volume excess or deficit  Outcome: Progressing     Problem: PAIN - ADULT  Goal: Verbalizes/displays adequate comfort level or baseline comfort level  Description  Interventions:  - Encourage patient to monitor pain and request assistance  - Assess pain using appropriate pain scale  - Administer analgesics based on type and severity of pain and evaluate response  - Implement non-pharmacological measures as appropriate and evaluate response  - Consider cultural and social influences on pain and pain management  - Notify physician/advanced practitioner if interventions unsuccessful or patient reports new pain  Outcome: Progressing     Problem: SAFETY ADULT  Goal: Patient will remain free of falls  Description  INTERVENTIONS:  - Assess patient frequently for physical needs  -  Identify cognitive and physical deficits and behaviors that affect risk of falls    -  [place] fall precautions as indicated by assessment   - Educate patient/family on patient safety including physical limitations  - Instruct patient to call for assistance with activity based on assessment  - Modify environment to reduce risk of injury  - Consider OT/PT consult to assist with strengthening/mobility  Outcome: Progressing     Problem: Knowledge Deficit  Goal: Patient/family/caregiver demonstrates understanding of disease process, treatment plan, medications, and discharge instructions  Description  Complete learning assessment and assess knowledge base    Interventions:  - Provide teaching at level of understanding  - Provide teaching via preferred learning methods  Outcome: Progressing     Problem: GASTROINTESTINAL - ADULT  Goal: Minimal or absence of nausea and/or vomiting  Description  INTERVENTIONS:  - Administer IV fluids if ordered to ensure adequate hydration  - Maintain NPO status until nausea and vomiting are resolved  - Nasogastric tube if ordered  - Administer ordered antiemetic medications as needed  - Provide nonpharmacologic comfort measures as appropriate  - Advance diet as tolerated, if ordered  - Consider nutrition services referral to assist patient with adequate nutrition and appropriate food choices  Outcome: Progressing

## 2020-06-28 NOTE — PROGRESS NOTES
Verified with Dr Go Mosqueda with SLIM the 6 doses in total of 20 mEq IV potassium chloride to be administered to patient q2h  Will recheck potassium level and continue to replete  Patient to remain on telemetry  Will continue to monitor

## 2020-06-28 NOTE — ASSESSMENT & PLAN NOTE
Presenting with potassium of 2 6  Current deficit of 114 of K   Likely secondary to poor oral intake and history of gastric bypass 2015  Received 60 mEq potassium thus far  Continue replete and recheck in a m    Monitor on telemetry

## 2020-06-28 NOTE — UTILIZATION REVIEW
Initial Clinical Review    Admission: Date/Time/Statement: Admission Orders (From admission, onward)     OBS [email or web address] TO [email or web address]  SYNCOPE AND PERSISTENT HYPOKALEMIA WITH NEED FOR IV KCL REPLACEMENT       06/28/20 1031  Inpatient Admission Once     Transfer Service: General Medicine       Question Answer Comment   Admitting Physician Betito MELO    Level of Care Med Surg    Estimated length of stay More than 2 Midnights    Certification I certify that inpatient services are medically necessary for this patient for a duration of greater than two midnights  See H&P and MD Progress Notes for additional information about the patient's course of treatment  06/28/20 1031         ED Arrival Information     Expected Arrival Acuity Means of Arrival Escorted By Service Admission Type    - 6/27/2020 12:09 Emergent Wheelchair Self General Medicine Emergency    Arrival Complaint    fall head pain        Chief Complaint   Patient presents with    Syncope     patient reports about 30 minutes ago becoming faint and fell backwards and struck her head on a coffee table, history of syncope  Taking elequis  c/o headache, nausea  Assessment/Plan: 63 yo female w/Hx of PE/DVT, bipolar disorder, depression [place]'s disease, fibromyalgia, gastrectomy 2015, chronic back pain, to ED from home admitted observation upgraded to Inpatient d/t syncope and hypokalemia  Presented with syncopal event which occurred when she went from a sitting position to a standing position  reports feeling dizzy and then falling over  Suspect secondary to orthostatic hypotension  CT head- no acute intracranial abnormality  Initial trop <0 02  K 2 6  Lying blood pressure 146/72, standing blood pressure 106/52  Tele, IVF, KCl, eliquis  Abilify, Luvox, Ativan, zonogram, gabapentin  6/28: K decreasing to 2 4, despite KCL replacement in observation  Continue IV KCL replacement      ED Triage Vitals [06/27/20 1223] Temperature Pulse Respirations Blood Pressure SpO2   (!) 97 3 °F (36 3 °C) 83 18 150/68 97 %      Temp Source Heart Rate Source Patient Position - Orthostatic VS BP Location FiO2 (%)   Temporal Monitor Sitting Left arm --      Pain Score       9        Wt Readings from Last 1 Encounters:   06/27/20 107 kg (236 lb 5 3 oz)     Additional Vital Signs:   06/28/20 08:27:51  98 3 °F (36 8 °C)  83  18  122/65  84  93 %       06/28/20 01:47:01    82    114/67  83  93 %    Standing - Orthostatic VS   06/28/20 01:45:20    77    122/70  87  92 %    Sitting - Orthostatic VS   06/28/20 01:43:16    68    125/65  85  92 %    Lying - Orthostatic VS   06/27/20 22:07:25  98 7 °F (37 1 °C)  83  18  108/57  74  91 %                          06/27/20 1714        108/56  73      Standing - Orthostatic VS                                         06/27/20 1657    76    139/73  95  97 %    Sitting - Orthostatic VS   06/27/20 16:52:27  98 6 °F (37 °C)  70    146/72  97  97 %    Lying - Orthostatic VS   06/27/20 1649    72    76/55Abnormal   62  96 %       06/27/20 1627    74  18      97 %  None (Room air)  Lying    Patient Position - Orthostatic VS: semi fowlers at 06/27/20 1627   06/27/20 1553    74    121/83           06/27/20 1438    74    126/71  91  95 %  None (Room air)  Lying   06/27/20 1223  97 3 °F (36 3 °C)Abnormal   83  18  150/68    97 %  None (Room air)  Sitting       Pertinent Labs/Diagnostic Test Results:       Results from last 7 days   Lab Units 06/27/20  1843 06/27/20  1400   WBC Thousand/uL  --  8 73   HEMOGLOBIN g/dL  --  13 6   HEMATOCRIT %  --  41 2   PLATELETS Thousands/uL 296 230   NEUTROS ABS Thousands/µL  --  5 73         Results from last 7 days   Lab Units 06/28/20  0518 06/27/20  2206 06/27/20  1400   SODIUM mmol/L 148*  --  145   POTASSIUM mmol/L 2 4*  --  2 6*   CHLORIDE mmol/L 109*  --  103   CO2 mmol/L 33*  --  36*   ANION GAP mmol/L 6  --  6   BUN mg/dL 12  --  11 CREATININE mg/dL 0 96  --  0 81   EGFR ml/min/1 73sq m 64  --  79   CALCIUM mg/dL 8 7  --  8 9   MAGNESIUM mg/dL 2 1 2 1  --        Results from last 7 days   Lab Units 06/28/20  0518 06/27/20  1400   GLUCOSE RANDOM mg/dL 93 90       Results from last 7 days   Lab Units 06/27/20  2206 06/27/20  1843 06/27/20  1400   TROPONIN I ng/mL <0 02 <0 02 <0 02     6/27 cxr:No acute cardiopulmonary disease      No displaced right rib fracture or pneumothorax  6/27 ct head:No acute intracranial abnormality      6/27 ekg:Normal sinus rhythm  Voltage criteria for left ventricular hypertrophy  Nonspecific ST and T wave abnormality  Prolonged QT  Abnormal ECG  When compared with ECG of 15-OCT-2019 19:13,  Nonspecific T wave abnormality now evident in Anterior leads  Confirmed by Gin Ariza ([id number]) on 6/28/2020 8:58:21 AM    ECG 12 Lead Documentation Only  Date/Time: 6/27/2020 1:02 PM  Performed by: Tai Davis MD  Authorized by: Tai Davis MD      Indications / Diagnosis:  Syncope  ECG reviewed by me, the ED Provider: yes    Patient location:  ED  Interpretation:     Interpretation: normal    Rate:     ECG rate assessment: normal    Rhythm:     Rhythm: sinus rhythm    Ectopy:     Ectopy: none    QRS:     QRS axis:  Normal  Conduction:     Conduction: normal    ST segments:     ST segments:  Normal  T waves:     T waves: normal      ED Treatment:   Medication Administration from 06/27/2020 1209 to 06/27/2020 1654       Date/Time Order Dose Route Action Action by Comments     06/27/2020 1400 acetaminophen (TYLENOL) tablet 650 mg 650 mg Oral Given       06/27/2020 1527 potassium chloride (K-DUR,KLOR-CON) CR tablet 40 mEq 40 mEq Oral Given       06/27/2020 1544 potassium chloride 20 mEq IVPB (premix) 20 mEq Intravenous New Bag       06/27/2020 1542 sodium chloride 0 9 % bolus 500 mL 500 mL Intravenous New Bag       06/27/2020 1527 ondansetron (ZOFRAN-ODT) dispersible tablet 4 mg 4 mg Oral Given       06/27/2020 1531 ibuprofen (MOTRIN) tablet 400 mg 400 mg Oral Given          Past Medical History:   Diagnosis Date    Adrenal insufficiency ([place]'s disease) ([facility] )     Bipolar disorder     C  difficile diarrhea     Cervical radiculopathy     Chronic back pain     DVT, lower extremity ([facility] ) 1991    Fibromyalgia     History of TIAs     cannot remember details    Hypertension     Hypokalemia     Migraine     MRSA (methicillin resistant Staphylococcus aureus) 07/20/2012    nasal swab negative 4/5/19    Psychiatric disorder     Anxiety, major depression, bipolar    Spinal stenosis     Syncope 2014    orthostatic hypotension     Present on Admission:   Syncope   PE (pulmonary thromboembolism) ([facility] )   Bipolar depression ([facility] )   Migraine   Hypokalemia      Admitting Diagnosis: Syncope [R55]  Injury of head, initial encounter [S09 90XA]  Muscle strain of chest wall, initial encounter [S29 011A]  Age/Sex: 62 y o  female  Admission Orders:  Scheduled Medications:    Medications:  apixaban 5 mg Oral BID   ARIPiprazole 15 mg Oral Daily   fluvoxaMINE 300 mg Oral HS   gabapentin 600 mg Oral TID   lamoTRIgine 200 mg Oral Daily   LORazepam 2 mg Oral BID   LORazepam 4 mg Oral HS   mexiletine 150 mg Oral TID   potassium chloride 20 mEq Intravenous Q2H   zonisamide 200 mg Oral Daily     Continuous IV Infusions:    sodium chloride 0 9 % with KCl 40 mEq/L 75 mL/hr Intravenous Continuous     PRN Meds:    ondansetron 4 mg Intravenous Q6H PRN 6/28 x 2     scd  oob  IS  Tele  Reg diet      Network Utilization Review Department  [email or web address]  ATTENTION: Please call with any questions or concerns to [phone] and carefully listen to the prompts so that you are directed to the right person   All voicemails are confidential   [facility] all requests for admission clinical reviews, approved or denied determinations and any other requests to dedicated fax number below belonging to the campus where the patient is receiving treatment   List of dedicated fax numbers for the Facilities:  [address] DENIALS (Administrative/Medical Necessity) [phone]   [address] (Maternity/NICU/Pediatrics) [phone]   Anjali Haney [phone]   Susanne Medrano [phone]   Annetta Nathaniel [phone]   Brian Ashby [phone]   [address] [facility] [address] [facility] [phone]   [facility]  [phone]   [address] [facility] [address]  [address] [facility] [address] [facility] [phone]

## 2020-06-29 PROBLEM — E66.09 CLASS 2 OBESITY DUE TO EXCESS CALORIES WITH BODY MASS INDEX (BMI) OF 35.0 TO 35.9 IN ADULT: Status: ACTIVE | Noted: 2019-10-16

## 2020-06-29 PROBLEM — E66.812 CLASS 2 OBESITY DUE TO EXCESS CALORIES WITH BODY MASS INDEX (BMI) OF 35.0 TO 35.9 IN ADULT: Status: ACTIVE | Noted: 2019-10-16

## 2020-06-29 LAB
ALBUMIN SERPL BCP-MCNC: 2.3 G/DL (ref 3.5–5)
ALP SERPL-CCNC: 126 U/L (ref 46–116)
ALT SERPL W P-5'-P-CCNC: 36 U/L (ref 12–78)
ANION GAP SERPL CALCULATED.3IONS-SCNC: 7 MMOL/L (ref 4–13)
AST SERPL W P-5'-P-CCNC: 34 U/L (ref 5–45)
BILIRUB SERPL-MCNC: 0.15 MG/DL (ref 0.2–1)
BUN SERPL-MCNC: 14 MG/DL (ref 5–25)
CALCIUM SERPL-MCNC: 8.4 MG/DL (ref 8.3–10.1)
CHLORIDE SERPL-SCNC: 112 MMOL/L (ref 100–108)
CO2 SERPL-SCNC: 28 MMOL/L (ref 21–32)
CREAT SERPL-MCNC: 1.08 MG/DL (ref 0.6–1.3)
GFR SERPL CREATININE-BSD FRML MDRD: 56 ML/MIN/1.73SQ M
GLUCOSE SERPL-MCNC: 108 MG/DL (ref 65–140)
POTASSIUM SERPL-SCNC: 3.5 MMOL/L (ref 3.5–5.3)
PROT SERPL-MCNC: 5.6 G/DL (ref 6.4–8.2)
SODIUM SERPL-SCNC: 147 MMOL/L (ref 136–145)

## 2020-06-29 PROCEDURE — 80053 COMPREHEN METABOLIC PANEL: CPT | Performed by: FAMILY MEDICINE

## 2020-06-29 PROCEDURE — 99254 IP/OBS CNSLTJ NEW/EST MOD 60: CPT | Performed by: PSYCHIATRY & NEUROLOGY

## 2020-06-29 PROCEDURE — 99232 SBSQ HOSP IP/OBS MODERATE 35: CPT | Performed by: NURSE PRACTITIONER

## 2020-06-29 RX ORDER — METOCLOPRAMIDE HYDROCHLORIDE 5 MG/ML
10 INJECTION INTRAMUSCULAR; INTRAVENOUS EVERY 8 HOURS SCHEDULED
Status: DISCONTINUED | OUTPATIENT
Start: 2020-06-29 | End: 2020-07-01 | Stop reason: HOSPADM

## 2020-06-29 RX ORDER — MAGNESIUM HYDROXIDE/ALUMINUM HYDROXICE/SIMETHICONE 120; 1200; 1200 MG/30ML; MG/30ML; MG/30ML
30 SUSPENSION ORAL EVERY 4 HOURS PRN
Status: DISCONTINUED | OUTPATIENT
Start: 2020-06-29 | End: 2020-07-01 | Stop reason: HOSPADM

## 2020-06-29 RX ORDER — MAGNESIUM SULFATE HEPTAHYDRATE 40 MG/ML
2 INJECTION, SOLUTION INTRAVENOUS ONCE
Status: COMPLETED | OUTPATIENT
Start: 2020-06-29 | End: 2020-06-29

## 2020-06-29 RX ORDER — DIPHENHYDRAMINE HYDROCHLORIDE 50 MG/ML
25 INJECTION INTRAMUSCULAR; INTRAVENOUS EVERY 8 HOURS
Status: DISCONTINUED | OUTPATIENT
Start: 2020-06-29 | End: 2020-07-01 | Stop reason: HOSPADM

## 2020-06-29 RX ORDER — DIPHENHYDRAMINE HYDROCHLORIDE 50 MG/ML
25 INJECTION INTRAMUSCULAR; INTRAVENOUS EVERY 8 HOURS PRN
Status: DISCONTINUED | OUTPATIENT
Start: 2020-06-29 | End: 2020-06-29

## 2020-06-29 RX ADMIN — APIXABAN 5 MG: 5 TABLET, FILM COATED ORAL at 17:17

## 2020-06-29 RX ADMIN — DIPHENHYDRAMINE HYDROCHLORIDE 25 MG: 50 INJECTION, SOLUTION INTRAMUSCULAR; INTRAVENOUS at 21:21

## 2020-06-29 RX ADMIN — MEXILETINE HYDROCHLORIDE 150 MG: 150 CAPSULE ORAL at 08:41

## 2020-06-29 RX ADMIN — GABAPENTIN 600 MG: 300 CAPSULE ORAL at 21:19

## 2020-06-29 RX ADMIN — MEXILETINE HYDROCHLORIDE 150 MG: 150 CAPSULE ORAL at 15:56

## 2020-06-29 RX ADMIN — LORAZEPAM 4 MG: 1 TABLET ORAL at 21:18

## 2020-06-29 RX ADMIN — APIXABAN 5 MG: 5 TABLET, FILM COATED ORAL at 08:37

## 2020-06-29 RX ADMIN — METOCLOPRAMIDE 10 MG: 5 INJECTION, SOLUTION INTRAMUSCULAR; INTRAVENOUS at 15:52

## 2020-06-29 RX ADMIN — GABAPENTIN 600 MG: 300 CAPSULE ORAL at 08:36

## 2020-06-29 RX ADMIN — LORAZEPAM 2 MG: 1 TABLET ORAL at 08:36

## 2020-06-29 RX ADMIN — ACETAMINOPHEN 650 MG: 325 TABLET ORAL at 11:37

## 2020-06-29 RX ADMIN — POTASSIUM CHLORIDE AND SODIUM CHLORIDE 75 ML/HR: 900; 300 INJECTION, SOLUTION INTRAVENOUS at 03:18

## 2020-06-29 RX ADMIN — FLUVOXAMINE MALEATE 300 MG: 50 TABLET ORAL at 21:27

## 2020-06-29 RX ADMIN — ARIPIPRAZOLE 15 MG: 5 TABLET ORAL at 08:34

## 2020-06-29 RX ADMIN — MEXILETINE HYDROCHLORIDE 150 MG: 150 CAPSULE ORAL at 21:26

## 2020-06-29 RX ADMIN — MAGNESIUM SULFATE HEPTAHYDRATE 2 G: 40 INJECTION, SOLUTION INTRAVENOUS at 17:15

## 2020-06-29 RX ADMIN — ONDANSETRON 4 MG: 2 INJECTION INTRAMUSCULAR; INTRAVENOUS at 11:38

## 2020-06-29 RX ADMIN — LORAZEPAM 2 MG: 1 TABLET ORAL at 17:17

## 2020-06-29 RX ADMIN — SODIUM CHLORIDE 500 MG: 0.9 INJECTION, SOLUTION INTRAVENOUS at 14:59

## 2020-06-29 RX ADMIN — DIPHENHYDRAMINE HYDROCHLORIDE 25 MG: 50 INJECTION, SOLUTION INTRAMUSCULAR; INTRAVENOUS at 15:52

## 2020-06-29 RX ADMIN — ALUMINUM HYDROXIDE, MAGNESIUM HYDROXIDE, AND SIMETHICONE 30 ML: 200; 200; 20 SUSPENSION ORAL at 02:30

## 2020-06-29 RX ADMIN — ACETAMINOPHEN 650 MG: 325 TABLET ORAL at 02:30

## 2020-06-29 RX ADMIN — ONDANSETRON 4 MG: 2 INJECTION INTRAMUSCULAR; INTRAVENOUS at 05:32

## 2020-06-29 RX ADMIN — ZONISAMIDE 200 MG: 100 CAPSULE ORAL at 08:41

## 2020-06-29 RX ADMIN — LAMOTRIGINE 200 MG: 100 TABLET ORAL at 08:35

## 2020-06-29 RX ADMIN — METOCLOPRAMIDE 10 MG: 5 INJECTION, SOLUTION INTRAMUSCULAR; INTRAVENOUS at 21:21

## 2020-06-29 RX ADMIN — GABAPENTIN 600 MG: 300 CAPSULE ORAL at 15:52

## 2020-06-29 RX ADMIN — VALPROATE SODIUM 1000 MG: 100 INJECTION, SOLUTION INTRAVENOUS at 15:51

## 2020-06-29 NOTE — PLAN OF CARE
Problem: Potential for Falls  Goal: Patient will remain free of falls  Description  INTERVENTIONS:  - Assess patient frequently for physical needs  -  Identify cognitive and physical deficits and behaviors that affect risk of falls    -  [place] fall precautions as indicated by assessment   - Educate patient/family on patient safety including physical limitations  - Instruct patient to call for assistance with activity based on assessment  - Modify environment to reduce risk of injury  - Consider OT/PT consult to assist with strengthening/mobility  Outcome: Progressing     Problem: Prexisting or High Potential for Compromised Skin Integrity  Goal: Skin integrity is maintained or improved  Description  INTERVENTIONS:  - Identify patients at risk for skin breakdown  - Assess and monitor skin integrity  - Assess and monitor nutrition and hydration status  - Monitor labs   - Assess for incontinence   - Turn and reposition patient  - Assist with mobility/ambulation  - Relieve pressure over bony prominences  - Avoid friction and shearing  - Provide appropriate hygiene as needed including keeping skin clean and dry  - Evaluate need for skin moisturizer/barrier cream  - Collaborate with interdisciplinary team   - Patient/family teaching  - Consider wound care consult   Outcome: Progressing     Problem: CARDIOVASCULAR - ADULT  Goal: Maintains optimal cardiac output and hemodynamic stability  Description  INTERVENTIONS:  - Monitor I/O, vital signs and rhythm  - Monitor for S/S and trends of decreased cardiac output  - Administer and titrate ordered vasoactive medications to optimize hemodynamic stability  - Assess quality of pulses, skin color and temperature  - Assess for signs of decreased coronary artery perfusion  - Instruct patient to report change in severity of symptoms  Outcome: Progressing  Goal: Absence of cardiac dysrhythmias or at baseline rhythm  Description  INTERVENTIONS:  - Continuous cardiac monitoring, vital signs, obtain 12 lead EKG if ordered  - Administer antiarrhythmic and heart rate control medications as ordered  - Monitor electrolytes and administer replacement therapy as ordered  Outcome: Progressing     Problem: METABOLIC, FLUID AND ELECTROLYTES - ADULT  Goal: Electrolytes maintained within normal limits  Description  INTERVENTIONS:  - Monitor labs and assess patient for signs and symptoms of electrolyte imbalances  - Administer electrolyte replacement as ordered  - Monitor response to electrolyte replacements, including repeat lab results as appropriate  - Instruct patient on fluid and nutrition as appropriate  Outcome: Progressing  Goal: Fluid balance maintained  Description  INTERVENTIONS:  - Monitor labs   - Monitor I/O and WT  - Instruct patient on fluid and nutrition as appropriate  - Assess for signs & symptoms of volume excess or deficit  Outcome: Progressing     Problem: PAIN - ADULT  Goal: Verbalizes/displays adequate comfort level or baseline comfort level  Description  Interventions:  - Encourage patient to monitor pain and request assistance  - Assess pain using appropriate pain scale  - Administer analgesics based on type and severity of pain and evaluate response  - Implement non-pharmacological measures as appropriate and evaluate response  - Consider cultural and social influences on pain and pain management  - Notify physician/advanced practitioner if interventions unsuccessful or patient reports new pain  Outcome: Progressing     Problem: SAFETY ADULT  Goal: Patient will remain free of falls  Description  INTERVENTIONS:  - Assess patient frequently for physical needs  -  Identify cognitive and physical deficits and behaviors that affect risk of falls    -  [place] fall precautions as indicated by assessment   - Educate patient/family on patient safety including physical limitations  - Instruct patient to call for assistance with activity based on assessment  - Modify environment to reduce risk of injury  - Consider OT/PT consult to assist with strengthening/mobility  Outcome: Progressing     Problem: Knowledge Deficit  Goal: Patient/family/caregiver demonstrates understanding of disease process, treatment plan, medications, and discharge instructions  Description  Complete learning assessment and assess knowledge base    Interventions:  - Provide teaching at level of understanding  - Provide teaching via preferred learning methods  Outcome: Progressing     Problem: GASTROINTESTINAL - ADULT  Goal: Minimal or absence of nausea and/or vomiting  Description  INTERVENTIONS:  - Administer IV fluids if ordered to ensure adequate hydration  - Maintain NPO status until nausea and vomiting are resolved  - Nasogastric tube if ordered  - Administer ordered antiemetic medications as needed  - Provide nonpharmacologic comfort measures as appropriate  - Advance diet as tolerated, if ordered  - Consider nutrition services referral to assist patient with adequate nutrition and appropriate food choices  Outcome: Progressing

## 2020-06-29 NOTE — CONSULTS
Consultation - Neurology   Misty Hester 62 y o  female MRN: [id number]  Unit/Bed#: [address] 87 203-01 Encounter: [id number]    Assessment/Plan   Assessment:  Misty Hester is a 62 y o  female with chronic migraines without improvement on multiple preventative and abortive medications, fibromyalgia, Union's, HTN, prior TIA, prior PE on Eliquis, cervical radiculopathy, chronic back pain, orthostatic hypotension, anxiety, major depressive disorder, and bipolar disorder who presented to [facility] ED on 6/27/2020 for syncopal episode with head strike  CT head unremarkable  Potassium 2 6 on admission, but remainder of labs WNL  Found to have orthostatic hypotension  Neurology was consulted for new onset migraine that began on 6/28/2020  Described as 10/10 pain all over the head associated with photophobia, phonophobia, nausea, and right LE tingling        Plan:  1) Migraine  - CT head unremarkable  - Migraine cocktail  · Methylprednisolone 500 mg daily  · Reglan 10 mg Q8 hours  · Benadryl 25 mg Q8 hours  - Depacon 1000 mg daily (max 3 days)  - Magnesium 2 g x 1  - Fluids  - Continue home migraine medications  · Gabapentin 600 mg TID  · Lamictal 200 mg daily  · Zonegran 200 mg daily  - Will not transfer to [facility] for ketamine infusion due to high user advisory note  - Avoid narcotics and sedating agents  - Will call [facility] neurology clinic for close outpatient follow-up    2) Syncopal episode with head strike  - CT head unremarkable  - Likely secondary to orthostatic hypotension  - Primary team following    3) Hypokalemia   - 2 6 on admission, 3 5 today  - Potassium repleted      History of Present Illness     Reason for Consult / Principal Problem: migraine  Hx and PE limited by: N/A  HPI: Misty Hester is a 62 y o  female with chronic migraines without improvement on multiple preventative and abortive medications, fibromyalgia, Andrés's, HTN, prior TIA, prior PE on Eliquis, cervical radiculopathy, chronic back pain, orthostatic hypotension, anxiety, major depressive disorder, and bipolar disorder who presented to [facility] ED on 6/27/2020 for syncope  History obtained per chart review and patient  Patient had a syncopal event which occurred when she went from a sitting to standing position  Patient hit her head on the coffee table and was unconscious for approximately 3 minutes  Upon arrival to the ED, /68  Orthostatic BP positive  CT head unremarkable  Labs revealed hypokalemia (2 6), but remainder of CMP, CBC, and troponins WNL  No neuro deficits upon arrival       Today (6/29/2020), Neurology was consulted for migraine that began yesterday evening while admitted  Patient states that it feels like her typical migraine  She had floaters and then 10/10 pain "all over the head "  Associated with photophobia, phonophobia, and nausea  She also has tingling in her right leg  States that the frequency of her migraines has decreased significantly since the carpet on her walls was removed 2 months ago  Has only had 1-2 migraines in the last 2 months  States that Depacon helps with her migraines  Patient is well known to the inpatient and outpatient Neurology group, and is a high utilizer  Patient often comes to the [employer]  Per high utilization note from 5/27/2020, patient has had 14 ER visits, 9 admissions, and 2 transfers to [facility] for Ketamine infusion in 2019  Patient has been on multiple different preventative and abortive migraine medications without relief  She has also been known to get admitted to the hospital for a certain chief complaint, and during the admission develops a migraine  Patient follows with [facility] Neurology  Most recently seen by [facility] Neurology on in February 2020 for numbness, falls, and migraines  While admitted patient received IV VPA, decadron taper, and continued her home migraine medications      On exam today, patient is lying comfortably in bed in a dark room while watching TV  Does not appear to be in any distress  No neuro deficits on her exam  Continues to have 10/10 head pain, neck pain, nausea, photophobia, phonophobia, and right LE tingling  Denies vomiting, arm/leg weakness, chest pain, shortness of breath, and abdominal pain  Inpatient consult to Neurology  Consult performed by: Rio Pierre PA-C  Consult ordered by: MOON Dailey          Review of Systems   Constitutional: Positive for fatigue  Negative for diaphoresis and fever  HENT: Negative for trouble swallowing and voice change  Eyes: Positive for photophobia and visual disturbance (floaters)  Respiratory: Negative for chest tightness and shortness of breath  Cardiovascular: Negative for chest pain and palpitations  Gastrointestinal: Positive for nausea  Negative for abdominal pain, constipation, diarrhea and vomiting  Genitourinary: Negative for difficulty urinating and dysuria  Musculoskeletal: Positive for neck pain  Negative for gait problem  Neurological: Positive for numbness and headaches  Negative for dizziness, tremors, speech difficulty and weakness  Psychiatric/Behavioral: Negative for agitation and behavioral problems         Historical Information   Past Medical History:   Diagnosis Date    Adrenal insufficiency ([place]'s disease) ([facility] [address] )     Bipolar disorder     C  difficile diarrhea     Cervical radiculopathy     Chronic back pain     DVT, lower extremity ([facility] [address] ) 1991    Fibromyalgia     History of TIAs     cannot remember details    Hypertension     Hypokalemia     Migraine     MRSA (methicillin resistant Staphylococcus aureus) 07/20/2012    nasal swab negative 4/5/19    Psychiatric disorder     Anxiety, major depression, bipolar    Spinal stenosis     Syncope 2014    orthostatic hypotension     Past Surgical History:   Procedure Laterality Date    APPENDECTOMY      GASTRIC RESTRICTION SURGERY      Gastric Sleeve Nov     HYSTERECTOMY      JOINT REPLACEMENT      Left Knee  and Right Hip      Social History   Social History     Substance and Sexual Activity   Alcohol Use Not Currently    Frequency: Never    Binge frequency: Never     Social History     Substance and Sexual Activity   Drug Use Never     E-Cigarette/Vaping     E-Cigarette/Vaping Substances     Social History     Tobacco Use   Smoking Status Former Smoker    Packs/day: 0 20    Types: Cigarettes    Last attempt to quit:     Years since quittin 5   Smokeless Tobacco Never Used     Family History:   Family History   Problem Relation Age of Onset    Breast cancer Mother     Migraines Mother     Arthritis Mother     Kidney disease Father     Heart disease Father     Diabetes Father     Arthritis Father     Brain cancer Brother     Seizures Brother        Review of previous medical records was completed   Reviewed prior notes, labs, CT head    Meds/Allergies   all current active meds have been reviewed, current meds:   Current Facility-Administered Medications   Medication Dose Route Frequency    acetaminophen (TYLENOL) tablet 650 mg  650 mg Oral Q6H PRN    aluminum-magnesium hydroxide-simethicone (MYLANTA) 200-200-20 mg/5 mL oral suspension 30 mL  30 mL Oral Q4H PRN    apixaban (ELIQUIS) tablet 5 mg  5 mg Oral BID    ARIPiprazole (ABILIFY) tablet 15 mg  15 mg Oral Daily    diphenhydrAMINE (BENADRYL) injection 25 mg  25 mg Intravenous Q8H    fluvoxaMINE (LUVOX) tablet 300 mg  300 mg Oral HS    gabapentin (NEURONTIN) capsule 600 mg  600 mg Oral TID    lamoTRIgine (LaMICtal) tablet 200 mg  200 mg Oral Daily    LORazepam (ATIVAN) tablet 2 mg  2 mg Oral BID    LORazepam (ATIVAN) tablet 4 mg  4 mg Oral HS    methylPREDNISolone sodium succinate (Solu-MEDROL) 500 mg in sodium chloride 0 9 % 250 mL IVPB  500 mg Intravenous QAM    metoclopramide (REGLAN) injection 10 mg  10 mg Intravenous Q8H [address]    mexiletine (MEXITIL) capsule 150 mg  150 mg Oral TID    ondansetron (ZOFRAN) injection 4 mg  4 mg Intravenous Q6H PRN    valproate (DEPACON) 1,000 mg in sodium chloride 0 9 % 50 mL IVPB  1,000 mg Intravenous Q24H [address]    zonisamide (ZONEGRAN) capsule 200 mg  200 mg Oral Daily    and PTA meds:   Prior to Admission Medications   Prescriptions Last Dose Informant Patient Reported? Taking? ARIPiprazole (ABILIFY) 15 mg tablet   No No   Sig: Take 0 5 tablets (7 5 mg total) by mouth daily   Patient taking differently: Take 15 mg by mouth daily    Erenumab-aooe (AIMOVIG) 140 MG/ML SOAJ   Yes No   Sig: Inject 140 mg under the skin every 30 (thirty) days    LORazepam (ATIVAN) 1 mg tablet   Yes No   Sig: Take 2 mg by mouth 2 (two) times a day    LORazepam (ATIVAN) 2 mg tablet   Yes Yes   Sig: Take 4 mg by mouth daily at bedtime   acetaminophen (TYLENOL) 500 mg tablet   No No   Sig: Take 1 tablet (500 mg total) by mouth every 6 (six) hours as needed (pain)   apixaban (ELIQUIS) 5 mg   Yes No   Sig: Take 5 mg by mouth 2 (two) times a day   cholecalciferol (VITAMIN D3) 1,000 units tablet   Yes No   Sig: Take 5,000 Units by mouth daily   ferrous sulfate 325 (65 Fe) mg tablet   Yes No   Sig: Take 325 mg by mouth daily with breakfast   fluvoxaMINE (LUVOX) 50 mg tablet   No No   Sig: Take 3 tablets (150 mg total) by mouth daily at bedtime   Patient taking differently: Take 300 mg by mouth daily at bedtime    gabapentin (NEURONTIN) 600 MG tablet   Yes No   Sig: Take 600 mg by mouth 3 (three) times a day  lamoTRIgine (LaMICtal) 200 MG tablet   Yes No   Sig: Take 200 mg by mouth daily     meclizine (ANTIVERT) 12 5 MG tablet   No No   Sig: Take 1 tablet (12 5 mg total) by mouth every 8 (eight) hours as needed for dizziness   mexiletine (MEXITIL) 150 mg capsule Not Taking at Unknown time  Yes No   Sig: Take 150 mg by mouth 3 (three) times a day   ondansetron (ZOFRAN) 4 mg tablet   No No   Sig: Take 1 tablet (4 mg total) by mouth every 8 (eight) hours as needed for nausea or vomiting   zonisamide (ZONEGRAN) 100 mg capsule   Yes No   Sig: Take 200 mg by mouth daily      Facility-Administered Medications: None       Allergies   Allergen Reactions    Ketorolac Itching and Other (See Comments)     [toradol] Itching, hives    Mushroom Extract Complex        Objective   Vitals:Blood pressure 155/84, pulse 75, temperature 98 6 °F (37 °C), resp  rate 18, height 5' 8" (1 727 m), weight 107 kg (236 lb 5 3 oz), SpO2 95 %  ,Body mass index is 35 93 kg/m²  Intake/Output Summary (Last 24 hours) at 6/29/2020 1218  Last data filed at 6/28/2020 1245  Gross per 24 hour   Intake 1147 5 ml   Output    Net 1147 5 ml       Invasive Devices: Invasive Devices     Peripheral Intravenous Line            Peripheral IV 06/27/20 Right Antecubital 1 day                Physical Exam   Constitutional: She is oriented to person, place, and time  She appears well-developed and well-nourished  No distress  Patient lying comfortably in bed in a dark room in no acute distress while watching TV   HENT:   Head: Normocephalic and atraumatic  Right Ear: External ear normal    Left Ear: External ear normal    Nose: Nose normal    Mouth/Throat: Oropharynx is clear and moist  No oropharyngeal exudate  Eyes: Pupils are equal, round, and reactive to light  Conjunctivae and EOM are normal    Neck: Normal range of motion  Neck supple  Cardiovascular: Normal rate and regular rhythm  Pulmonary/Chest: Effort normal    Abdominal: Soft  Musculoskeletal: Normal range of motion  Neurological: She is alert and oriented to person, place, and time  She has a normal Finger-Nose-Finger Test    Skin: Skin is warm and dry  Capillary refill takes less than 2 seconds  She is not diaphoretic  Psychiatric: Her behavior is normal    Nursing note and vitals reviewed  Neurologic Exam     Mental Status   Oriented to person, place, and time  Patient alert and oriented to person, place, city, month, and year    No dysarthria or aphasia  Able to follow simple commands  Normal attention and concentration  Cranial Nerves     CN II   Visual fields full to confrontation  CN III, IV, VI   Pupils are equal, round, and reactive to light  Extraocular motions are normal    Right pupil: Size: 5 mm  Shape: regular  Reactivity: brisk  Consensual response: intact  Left pupil: Size: 5 mm  Shape: regular  Reactivity: brisk  Consensual response: intact  Nystagmus: none     CN V   Facial sensation intact  CN VII   Facial expression full, symmetric  CN VIII   Hearing: intact    CN IX, X   Palate: symmetric    CN XI   Right sternocleidomastoid strength: normal  Left sternocleidomastoid strength: normal  Right trapezius strength: normal  Left trapezius strength: normal    CN XII   Tongue: not atrophic  Fasciculations: absent  Tongue deviation: none    Motor Exam   Muscle bulk: normal  Overall muscle tone: normal  Right arm pronator drift: absent  Left arm pronator drift: absent  5/5 strength in bilateral upper and lower extremities     Sensory Exam   Sensation to light touch, temperature, and vibration intact throughout  Gait, Coordination, and Reflexes     Gait  Gait: (deferred)    Coordination   Finger to nose coordination: normal    Tremor   Resting tremor: absent  Action tremor: absent    Reflexes   Right ankle clonus: absent  Left ankle clonus: absent      Lab Results:   I have personally reviewed pertinent reports    , CBC:   Results from last 7 days   Lab Units 06/27/20  1843 06/27/20  1400   WBC Thousand/uL  --  8 73   RBC Million/uL  --  4 21   HEMOGLOBIN g/dL  --  13 6   HEMATOCRIT %  --  41 2   MCV fL  --  98   PLATELETS Thousands/uL 296 230   , BMP/CMP:   Results from last 7 days   Lab Units 06/29/20  0058 06/28/20  1254 06/28/20  0518 06/27/20  1400   SODIUM mmol/L 147*  --  148* 145   POTASSIUM mmol/L 3 5 3 0* 2 4* 2 6*   CHLORIDE mmol/L 112*  --  109* 103   CO2 mmol/L 28  --  33* 36*   BUN mg/dL 14  --  12 11   CREATININE mg/dL 1 08  --  0 96 0 81   CALCIUM mg/dL 8 4  --  8 7 8 9   AST U/L 34  --   --   --    ALT U/L 36  --   --   --    ALK PHOS U/L 126*  --   --   --    EGFR ml/min/1 73sq m 56  --  64 79     Imaging Studies: I have personally reviewed pertinent reports  and I have personally reviewed pertinent films in PACS  EKG, Pathology, and Other Studies: I have personally reviewed pertinent reports     and I have personally reviewed pertinent films in PACS  VTE Prophylaxis: Sequential compression device (Venodyne) and Eliquis    Code Status: Level 1 - Full Code

## 2020-06-29 NOTE — PLAN OF CARE
Problem: Potential for Falls  Goal: Patient will remain free of falls  Description  INTERVENTIONS:  - Assess patient frequently for physical needs  -  Identify cognitive and physical deficits and behaviors that affect risk of falls    -  [place] fall precautions as indicated by assessment   - Educate patient/family on patient safety including physical limitations  - Instruct patient to call for assistance with activity based on assessment  - Modify environment to reduce risk of injury  - Consider OT/PT consult to assist with strengthening/mobility  Outcome: Progressing     Problem: Prexisting or High Potential for Compromised Skin Integrity  Goal: Skin integrity is maintained or improved  Description  INTERVENTIONS:  - Identify patients at risk for skin breakdown  - Assess and monitor skin integrity  - Assess and monitor nutrition and hydration status  - Monitor labs   - Assess for incontinence   - Turn and reposition patient  - Assist with mobility/ambulation  - Relieve pressure over bony prominences  - Avoid friction and shearing  - Provide appropriate hygiene as needed including keeping skin clean and dry  - Evaluate need for skin moisturizer/barrier cream  - Collaborate with interdisciplinary team   - Patient/family teaching  - Consider wound care consult   Outcome: Progressing     Problem: CARDIOVASCULAR - ADULT  Goal: Maintains optimal cardiac output and hemodynamic stability  Description  INTERVENTIONS:  - Monitor I/O, vital signs and rhythm  - Monitor for S/S and trends of decreased cardiac output  - Administer and titrate ordered vasoactive medications to optimize hemodynamic stability  - Assess quality of pulses, skin color and temperature  - Assess for signs of decreased coronary artery perfusion  - Instruct patient to report change in severity of symptoms  Outcome: Progressing  Goal: Absence of cardiac dysrhythmias or at baseline rhythm  Description  INTERVENTIONS:  - Continuous cardiac monitoring, vital signs, obtain 12 lead EKG if ordered  - Administer antiarrhythmic and heart rate control medications as ordered  - Monitor electrolytes and administer replacement therapy as ordered  Outcome: Progressing     Problem: METABOLIC, FLUID AND ELECTROLYTES - ADULT  Goal: Electrolytes maintained within normal limits  Description  INTERVENTIONS:  - Monitor labs and assess patient for signs and symptoms of electrolyte imbalances  - Administer electrolyte replacement as ordered  - Monitor response to electrolyte replacements, including repeat lab results as appropriate  - Instruct patient on fluid and nutrition as appropriate  Outcome: Progressing  Goal: Fluid balance maintained  Description  INTERVENTIONS:  - Monitor labs   - Monitor I/O and WT  - Instruct patient on fluid and nutrition as appropriate  - Assess for signs & symptoms of volume excess or deficit  Outcome: Progressing     Problem: PAIN - ADULT  Goal: Verbalizes/displays adequate comfort level or baseline comfort level  Description  Interventions:  - Encourage patient to monitor pain and request assistance  - Assess pain using appropriate pain scale  - Administer analgesics based on type and severity of pain and evaluate response  - Implement non-pharmacological measures as appropriate and evaluate response  - Consider cultural and social influences on pain and pain management  - Notify physician/advanced practitioner if interventions unsuccessful or patient reports new pain  Outcome: Progressing     Problem: SAFETY ADULT  Goal: Patient will remain free of falls  Description  INTERVENTIONS:  - Assess patient frequently for physical needs  -  Identify cognitive and physical deficits and behaviors that affect risk of falls    -  [place] fall precautions as indicated by assessment   - Educate patient/family on patient safety including physical limitations  - Instruct patient to call for assistance with activity based on assessment  - Modify environment to reduce risk of injury  - Consider OT/PT consult to assist with strengthening/mobility  Outcome: Progressing     Problem: Knowledge Deficit  Goal: Patient/family/caregiver demonstrates understanding of disease process, treatment plan, medications, and discharge instructions  Description  Complete learning assessment and assess knowledge base    Interventions:  - Provide teaching at level of understanding  - Provide teaching via preferred learning methods  Outcome: Progressing     Problem: GASTROINTESTINAL - ADULT  Goal: Minimal or absence of nausea and/or vomiting  Description  INTERVENTIONS:  - Administer IV fluids if ordered to ensure adequate hydration  - Maintain NPO status until nausea and vomiting are resolved  - Nasogastric tube if ordered  - Administer ordered antiemetic medications as needed  - Provide nonpharmacologic comfort measures as appropriate  - Advance diet as tolerated, if ordered  - Consider nutrition services referral to assist patient with adequate nutrition and appropriate food choices  Outcome: Progressing

## 2020-06-29 NOTE — ASSESSMENT & PLAN NOTE
· Presents after a syncopal event which occurred when she went from a sitting position to a standing position- secondary to orthostatic hypotension + on admission, now resolved with IVFs- component of dehydration contributing  · CT head- no acute intracranial abnormality  · trops negative   · Recent TSH within normal limits  · Hypokalemic with potassium of 2 6    Continue to replete  · repeat orthostatic BP yesterday:  Lying blood pressure 133/72, sitting 125/84; standing 127/85  · Dc IVFS

## 2020-06-29 NOTE — ASSESSMENT & PLAN NOTE
· Presented with potassium of 2 6  · Likely secondary to poor oral intake and history of gastric bypass 2015    · Potassium repleted

## 2020-06-30 LAB
ANION GAP SERPL CALCULATED.3IONS-SCNC: 8 MMOL/L (ref 4–13)
BUN SERPL-MCNC: 12 MG/DL (ref 5–25)
CALCIUM SERPL-MCNC: 8.8 MG/DL (ref 8.3–10.1)
CHLORIDE SERPL-SCNC: 106 MMOL/L (ref 100–108)
CO2 SERPL-SCNC: 29 MMOL/L (ref 21–32)
CREAT SERPL-MCNC: 0.87 MG/DL (ref 0.6–1.3)
GFR SERPL CREATININE-BSD FRML MDRD: 72 ML/MIN/1.73SQ M
GLUCOSE SERPL-MCNC: 146 MG/DL (ref 65–140)
POTASSIUM SERPL-SCNC: 4 MMOL/L (ref 3.5–5.3)
SODIUM SERPL-SCNC: 143 MMOL/L (ref 136–145)

## 2020-06-30 PROCEDURE — 80048 BASIC METABOLIC PNL TOTAL CA: CPT | Performed by: NURSE PRACTITIONER

## 2020-06-30 PROCEDURE — 99232 SBSQ HOSP IP/OBS MODERATE 35: CPT | Performed by: FAMILY MEDICINE

## 2020-06-30 PROCEDURE — 99232 SBSQ HOSP IP/OBS MODERATE 35: CPT | Performed by: PSYCHIATRY & NEUROLOGY

## 2020-06-30 RX ADMIN — METOCLOPRAMIDE 10 MG: 5 INJECTION, SOLUTION INTRAMUSCULAR; INTRAVENOUS at 05:01

## 2020-06-30 RX ADMIN — APIXABAN 5 MG: 5 TABLET, FILM COATED ORAL at 17:26

## 2020-06-30 RX ADMIN — ACETAMINOPHEN 650 MG: 325 TABLET ORAL at 23:52

## 2020-06-30 RX ADMIN — MEXILETINE HYDROCHLORIDE 150 MG: 150 CAPSULE ORAL at 08:34

## 2020-06-30 RX ADMIN — GABAPENTIN 600 MG: 300 CAPSULE ORAL at 17:00

## 2020-06-30 RX ADMIN — ARIPIPRAZOLE 15 MG: 5 TABLET ORAL at 08:34

## 2020-06-30 RX ADMIN — FLUVOXAMINE MALEATE 300 MG: 50 TABLET ORAL at 21:25

## 2020-06-30 RX ADMIN — MEXILETINE HYDROCHLORIDE 150 MG: 150 CAPSULE ORAL at 21:25

## 2020-06-30 RX ADMIN — APIXABAN 5 MG: 5 TABLET, FILM COATED ORAL at 08:34

## 2020-06-30 RX ADMIN — METOCLOPRAMIDE 10 MG: 5 INJECTION, SOLUTION INTRAMUSCULAR; INTRAVENOUS at 21:25

## 2020-06-30 RX ADMIN — LORAZEPAM 2 MG: 1 TABLET ORAL at 17:26

## 2020-06-30 RX ADMIN — MEXILETINE HYDROCHLORIDE 150 MG: 150 CAPSULE ORAL at 17:00

## 2020-06-30 RX ADMIN — DIPHENHYDRAMINE HYDROCHLORIDE 25 MG: 50 INJECTION, SOLUTION INTRAMUSCULAR; INTRAVENOUS at 13:14

## 2020-06-30 RX ADMIN — ZONISAMIDE 200 MG: 100 CAPSULE ORAL at 08:34

## 2020-06-30 RX ADMIN — LAMOTRIGINE 200 MG: 100 TABLET ORAL at 08:34

## 2020-06-30 RX ADMIN — GABAPENTIN 600 MG: 300 CAPSULE ORAL at 08:34

## 2020-06-30 RX ADMIN — ACETAMINOPHEN 650 MG: 325 TABLET ORAL at 05:00

## 2020-06-30 RX ADMIN — DIPHENHYDRAMINE HYDROCHLORIDE 25 MG: 50 INJECTION, SOLUTION INTRAMUSCULAR; INTRAVENOUS at 05:01

## 2020-06-30 RX ADMIN — LORAZEPAM 2 MG: 1 TABLET ORAL at 08:34

## 2020-06-30 RX ADMIN — SODIUM CHLORIDE 500 MG: 0.9 INJECTION, SOLUTION INTRAVENOUS at 09:22

## 2020-06-30 RX ADMIN — METOCLOPRAMIDE 10 MG: 5 INJECTION, SOLUTION INTRAMUSCULAR; INTRAVENOUS at 13:14

## 2020-06-30 RX ADMIN — ONDANSETRON 4 MG: 2 INJECTION INTRAMUSCULAR; INTRAVENOUS at 14:14

## 2020-06-30 RX ADMIN — VALPROATE SODIUM 1000 MG: 100 INJECTION, SOLUTION INTRAVENOUS at 08:33

## 2020-06-30 RX ADMIN — ALUMINUM HYDROXIDE, MAGNESIUM HYDROXIDE, AND SIMETHICONE 30 ML: 200; 200; 20 SUSPENSION ORAL at 23:52

## 2020-06-30 RX ADMIN — LORAZEPAM 4 MG: 1 TABLET ORAL at 21:25

## 2020-06-30 RX ADMIN — GABAPENTIN 600 MG: 300 CAPSULE ORAL at 21:25

## 2020-06-30 RX ADMIN — DIPHENHYDRAMINE HYDROCHLORIDE 25 MG: 50 INJECTION, SOLUTION INTRAMUSCULAR; INTRAVENOUS at 21:26

## 2020-06-30 RX ADMIN — ONDANSETRON 4 MG: 2 INJECTION INTRAMUSCULAR; INTRAVENOUS at 01:31

## 2020-06-30 NOTE — PROGRESS NOTES
Progress Note - Neurology   Michael Voss 62 y o  female [id number]  Unit/Bed#: [place] 68 2 /South 2 M*    Assessment:  Michael Voss is a 62 y o  female with chronic migraines, unresponsive to multiple preventative and abortive medications, bipolar disorder, fibromyalgia, and chronic pain, known to the neurology service for frequent admissions, seen in neurologic evaluation for acute on chronic migraine  Currently 6/10  Plan as follows  Plan:  - Current headache regimen:  · Methylprednisolone 500 mg daily  · Reglan 10 mg Q 8 hours  · Benadryl 25 mg Q 8 hours  · Depacon 1000 mg daily - max 3 days   · Continue fluids  - Continue home migraine medications:  Gabapentin 600 mg TID, Lamictal 200 mg daily, and Zonegran 200 mg daily  - Do not recommend transfer to [facility] for ketamine infusion, as patient has received in the past, due to high user advisory note  - Avoid narcotics or CNS sedating agents   - Medical management and supportive care per primary team  Correction of any metabolic or infectious disturbances    - Follow up with Replaced by [facility] headache clinic as an outpatient   - Likely stable for discharge tomorrow morning  Subjective: The patient reports that her headache is improving  She will continue to have some peaks of pain with increased nausea, but currently is at a 6/10  She reports she was a 10/10 on admission, 8/10 yesterday, and typically tolerates a 4/10  She reports some floaters in her bilateral eyes  She denies any diplopia  She denies any focal weakness or paresthesias  She denies any trouble with her speech          Past Medical History:   Diagnosis Date    Adrenal insufficiency (Andrés's disease) ([facility] [address] )     Bipolar disorder     C  difficile diarrhea     Cervical radiculopathy     Chronic back pain     DVT, lower extremity (HCC) 1991    Fibromyalgia     History of TIAs     cannot remember details    Hypertension     Hypokalemia     Migraine     MRSA (methicillin resistant Staphylococcus aureus) 2012    nasal swab negative 19    Psychiatric disorder     Anxiety, major depression, bipolar    Spinal stenosis     Syncope     orthostatic hypotension     Past Surgical History:   Procedure Laterality Date    APPENDECTOMY      GASTRIC RESTRICTION SURGERY      Gastric Sleeve 2015    HYSTERECTOMY      JOINT REPLACEMENT      Left Knee  and Right Hip      Family History   Problem Relation Age of Onset   Jefferson Pert Breast cancer Mother    Jefferson Pert Migraines Mother     Arthritis Mother     Kidney disease Father     Heart disease Father     Diabetes Father     Arthritis Father     Brain cancer Brother     Seizures Brother      Social History     Socioeconomic History    Marital status: /Civil Union     Spouse name: None    Number of children: None    Years of education: None    Highest education level: None   Occupational History    None   Social Needs    Financial resource strain: None    Food insecurity:     Worry: None     Inability: None    Transportation needs:     Medical: None     Non-medical: None   Tobacco Use    Smoking status: Former Smoker     Packs/day: 0 20     Types: Cigarettes     Last attempt to quit:      Years since quittin 5    Smokeless tobacco: Never Used   Substance and Sexual Activity    Alcohol use: Not Currently     Frequency: Never     Binge frequency: Never    Drug use: Never    Sexual activity: None   Lifestyle    Physical activity:     Days per week: None     Minutes per session: None    Stress: None   Relationships    Social connections:     Talks on phone: None     Gets together: None     Attends [employer] service: None     Active member of club or organization: None     Attends meetings of clubs or organizations: None     Relationship status: None    Intimate partner violence:     Fear of current or ex partner: None     Emotionally abused: None     Physically abused: None     Forced sexual activity: None   Other Topics Concern    None   Social History Narrative    None         Medications: All current active meds have been reviewed and current meds:  Scheduled Meds:  Current Facility-Administered Medications:  acetaminophen 650 mg Oral Q6H PRN Diandra Vaca MD    aluminum-magnesium hydroxide-simethicone 30 mL Oral Q4H PRN Alivia Nevarez PA-C    apixaban 5 mg Oral BID Sandy Piger, PA-YO    ARIPiprazole 15 mg Oral Daily Sandy Piger, PA-C    diphenhydrAMINE 25 mg Intravenous Q8H Kaye Orf, PA-C    fluvoxaMINE 300 mg Oral HS Sandy Piger, PA-C    gabapentin 600 mg Oral TID Krystin Auguste PA-C    lamoTRIgine 200 mg Oral Daily Sandy Piger, PA-C    LORazepam 2 mg Oral BID Sandy Piger, PA-C    LORazepam 4 mg Oral HS Sandy Piger, PA-C    methylPREDNISolone sodium succinate 500 mg Intravenous QAM Kaye Orf, PA-C Last Rate: 500 mg (06/30/20 4796)   metoclopramide 10 mg Intravenous Q8H [address] [facility] Roderick, SIRISHA    mexiletine 150 mg Oral TID Sandy Piger, PA-YO    ondansetron 4 mg Intravenous Q6H PRN Sandy SargentrSIRISHA    valproate (DEPACON) IVPB 1,000 mg Intravenous Q24H [address] Kaye Orf, PA-C Last Rate: 1,000 mg (06/30/20 2335)   zonisamide 200 mg Oral Daily Sandy Piger, PA-YO      Continuous Infusions:   PRN Meds:   acetaminophen    aluminum-magnesium hydroxide-simethicone    ondansetron       ROS:   Review of Systems   Constitutional: Positive for fatigue  Eyes: Positive for photophobia  Gastrointestinal: Positive for nausea  Neurological: Positive for headaches  All other systems reviewed and are negative  Vitals:   /79   Pulse 77   Temp 98 3 °F (36 8 °C)   Resp 18   Ht 5' 8" (1 727 m)   Wt 107 kg (236 lb 5 3 oz)   SpO2 90%   BMI 35 93 kg/m²     Physical Exam:   Physical Exam   Constitutional: She is oriented to person, place, and time  She appears well-developed and well-nourished  No distress  Resting comfortably in bed      HENT: Head: Normocephalic and atraumatic  Right Ear: External ear normal    Left Ear: External ear normal    Nose: Nose normal    Mouth/Throat: Oropharynx is clear and moist    Eyes: Pupils are equal, round, and reactive to light  Conjunctivae and EOM are normal  Right eye exhibits no discharge  Left eye exhibits no discharge  No scleral icterus  Neck: Normal range of motion  Neck supple  Cardiovascular: Normal rate  Pulmonary/Chest: Effort normal  No respiratory distress  Musculoskeletal: Normal range of motion  She exhibits no edema or deformity  Neurological: She is alert and oriented to person, place, and time  No cranial nerve deficit or sensory deficit  She has a normal Finger-Nose-Finger Test    Skin: Skin is warm  No rash noted  She is not diaphoretic  No erythema  No pallor  Psychiatric: She has a normal mood and affect  Her behavior is normal  Judgment and thought content normal    Nursing note and vitals reviewed  Neurologic Exam     Mental Status   Oriented to person, place, and time  Level of consciousness: alert  Follows all commands  No dysarthria or aphasia  Cranial Nerves     CN II   Visual fields full to confrontation  Visual acuity: (Grossly intact)    CN III, IV, VI   Pupils are equal, round, and reactive to light  Extraocular motions are normal    Right pupil: Shape: regular  Left pupil: Shape: regular  Nystagmus: none   Conjugate gaze: present    CN V   Facial sensation intact  CN VII   Facial expression full, symmetric  CN VIII   Hearing: intact    CN IX, X   Palate: symmetric    CN XI   Right sternocleidomastoid strength: normal  Left sternocleidomastoid strength: normal    CN XII   Tongue: not atrophic  Fasciculations: absent  Tongue deviation: none    Motor Exam   5/5 strength in B/L UE  Poor effort in B/L LE, approximately 4/5 in proximal muscles and 5/5 distally        Sensory Exam   Light touch normal      Gait, Coordination, and Reflexes Gait  Gait: (Deferred)    Coordination   Finger to nose coordination: normal    Tremor   Resting tremor: absent  Intention tremor: absent  Action tremor: absent          Labs: I have personally reviewed pertinent reports  Recent Results (from the past 24 hour(s))   Basic metabolic panel    Collection Time: 06/30/20  5:07 AM   Result Value Ref Range    Sodium 143 136 - 145 mmol/L    Potassium 4 0 3 5 - 5 3 mmol/L    Chloride 106 100 - 108 mmol/L    CO2 29 21 - 32 mmol/L    ANION GAP 8 4 - 13 mmol/L    BUN 12 5 - 25 mg/dL    Creatinine 0 87 0 60 - 1 30 mg/dL    Glucose 146 (H) 65 - 140 mg/dL    Calcium 8 8 8 3 - 10 1 mg/dL    eGFR 72 ml/min/1 73sq m       Imaging: I have personally reviewed pertinent imaging in PACS, including CTH,  and I have personally reviewed PACS reports  EKG, Pathology, and Other Studies: I have personally reviewed pertinent reports  VTE Prophylaxis: Eliquis    Counseling / Coordination of Care  Total time spent today 25 minutes  Greater than 50% of total time was spent with the patient and/or family counseling and/or coordination of care  A description of the counseling/coordination of care:  Patient was seen and evaluated  Discussed current migraine regimen and migraine prevention  Chart reviewed thoroughly including laboratory and imaging studies    Plan of care discussed with attending neurologist and primary team

## 2020-06-30 NOTE — PLAN OF CARE
Problem: Potential for Falls  Goal: Patient will remain free of falls  Description  INTERVENTIONS:  - Assess patient frequently for physical needs  -  Identify cognitive and physical deficits and behaviors that affect risk of falls    -  Fowler fall precautions as indicated by assessment   - Educate patient/family on patient safety including physical limitations  - Instruct patient to call for assistance with activity based on assessment  - Modify environment to reduce risk of injury  - Consider OT/PT consult to assist with strengthening/mobility  Outcome: Progressing     Problem: Prexisting or High Potential for Compromised Skin Integrity  Goal: Skin integrity is maintained or improved  Description  INTERVENTIONS:  - Identify patients at risk for skin breakdown  - Assess and monitor skin integrity  - Assess and monitor nutrition and hydration status  - Monitor labs   - Assess for incontinence   - Turn and reposition patient  - Assist with mobility/ambulation  - Relieve pressure over bony prominences  - Avoid friction and shearing  - Provide appropriate hygiene as needed including keeping skin clean and dry  - Evaluate need for skin moisturizer/barrier cream  - Collaborate with interdisciplinary team   - Patient/family teaching  - Consider wound care consult   Outcome: Progressing     Problem: CARDIOVASCULAR - ADULT  Goal: Maintains optimal cardiac output and hemodynamic stability  Description  INTERVENTIONS:  - Monitor I/O, vital signs and rhythm  - Monitor for S/S and trends of decreased cardiac output  - Administer and titrate ordered vasoactive medications to optimize hemodynamic stability  - Assess quality of pulses, skin color and temperature  - Assess for signs of decreased coronary artery perfusion  - Instruct patient to report change in severity of symptoms  Outcome: Progressing  Goal: Absence of cardiac dysrhythmias or at baseline rhythm  Description  INTERVENTIONS:  - Continuous cardiac monitoring, vital signs, obtain 12 lead EKG if ordered  - Administer antiarrhythmic and heart rate control medications as ordered  - Monitor electrolytes and administer replacement therapy as ordered  Outcome: Progressing     Problem: METABOLIC, FLUID AND ELECTROLYTES - ADULT  Goal: Electrolytes maintained within normal limits  Description  INTERVENTIONS:  - Monitor labs and assess patient for signs and symptoms of electrolyte imbalances  - Administer electrolyte replacement as ordered  - Monitor response to electrolyte replacements, including repeat lab results as appropriate  - Instruct patient on fluid and nutrition as appropriate  Outcome: Progressing  Goal: Fluid balance maintained  Description  INTERVENTIONS:  - Monitor labs   - Monitor I/O and WT  - Instruct patient on fluid and nutrition as appropriate  - Assess for signs & symptoms of volume excess or deficit  Outcome: Progressing     Problem: PAIN - ADULT  Goal: Verbalizes/displays adequate comfort level or baseline comfort level  Description  Interventions:  - Encourage patient to monitor pain and request assistance  - Assess pain using appropriate pain scale  - Administer analgesics based on type and severity of pain and evaluate response  - Implement non-pharmacological measures as appropriate and evaluate response  - Consider cultural and social influences on pain and pain management  - Notify physician/advanced practitioner if interventions unsuccessful or patient reports new pain  Outcome: Progressing     Problem: SAFETY ADULT  Goal: Patient will remain free of falls  Description  INTERVENTIONS:  - Assess patient frequently for physical needs  -  Identify cognitive and physical deficits and behaviors that affect risk of falls    -  Anselmo fall precautions as indicated by assessment   - Educate patient/family on patient safety including physical limitations  - Instruct patient to call for assistance with activity based on assessment  - Modify environment to reduce risk of injury  - Consider OT/PT consult to assist with strengthening/mobility  Outcome: Progressing     Problem: Knowledge Deficit  Goal: Patient/family/caregiver demonstrates understanding of disease process, treatment plan, medications, and discharge instructions  Description  Complete learning assessment and assess knowledge base    Interventions:  - Provide teaching at level of understanding  - Provide teaching via preferred learning methods  Outcome: Progressing     Problem: GASTROINTESTINAL - ADULT  Goal: Minimal or absence of nausea and/or vomiting  Description  INTERVENTIONS:  - Administer IV fluids if ordered to ensure adequate hydration  - Maintain NPO status until nausea and vomiting are resolved  - Nasogastric tube if ordered  - Administer ordered antiemetic medications as needed  - Provide nonpharmacologic comfort measures as appropriate  - Advance diet as tolerated, if ordered  - Consider nutrition services referral to assist patient with adequate nutrition and appropriate food choices  Outcome: Progressing

## 2020-06-30 NOTE — ASSESSMENT & PLAN NOTE
· Hx of migraines with high utilizer plan  · Patient requested Neurology consultation and at the conclusion of their evaluation she is on the current regimen:  ? Methylprednisolone 500 mg daily x3 days to end tomorrow 7/1   ? Reglan 10 mg Q 8 hours x3 days to end tomorrow 7/1   ? Benadryl 25 mg Q 8 hours  ? Depacon 1000 mg daily - max 3 days  to end tomorrow 7/1   ? Continue fluids  - Continue home migraine medications:  Gabapentin 600 mg TID, Lamictal 200 mg daily, and Zonegran 200 mg daily  - Do not recommend transfer to [facility] for ketamine infusion, as patient has received in the past, due to high user advisory note    - Avoid narcotics or CNS sedating agents   - Medical management and supportive care per primary team  Correction of any metabolic or infectious disturbances    - Follow up with [facility] headache clinic as an outpatient   - Likely stable for discharge tomorrow morning

## 2020-06-30 NOTE — PROGRESS NOTES
Progress Note - Dannielle Cha 1960, 62 y o  female MRN: [id number]    Unit/Bed#: [place] [address] -01 Encounter: [id number]    Primary Care Provider: Christina Ramirez DO   Date and time admitted to hospital: 6/27/2020 12:25 PM        * Syncope  Assessment & Plan  · Presents after a syncopal event which occurred when she went from a sitting position to a standing position- secondary to orthostatic hypotension + on admission, now resolved with IVFs- component of dehydration contributing  · CT head- no acute intracranial abnormality  · trops negative   · Recent TSH within normal limits  · Hypokalemia repleted and potassium now stable  · Patient should be medically stable for discharge to home tomorrow  Migraine  Assessment & Plan  · Hx of migraines with high utilizer plan  · Patient requested Neurology consultation and at the conclusion of their evaluation she is on the current regimen:  ? Methylprednisolone 500 mg daily x3 days to end tomorrow 7/1   ? Reglan 10 mg Q 8 hours x3 days to end tomorrow 7/1   ? Benadryl 25 mg Q 8 hours  ? Depacon 1000 mg daily - max 3 days  to end tomorrow 7/1   ? Continue fluids  - Continue home migraine medications:  Gabapentin 600 mg TID, Lamictal 200 mg daily, and Zonegran 200 mg daily  - Do not recommend transfer to [facility] for ketamine infusion, as patient has received in the past, due to high user advisory note    - Avoid narcotics or CNS sedating agents   - Medical management and supportive care per primary team  Correction of any metabolic or infectious disturbances    - Follow up with [facility] headache clinic as an outpatient   - Likely stable for discharge tomorrow morning    Class 2 obesity due to excess calories with body mass index (BMI) of 35 0 to 35 9 in adult  Assessment & Plan  · BMI 35 9-  Noted  · Encourage wt loss and diet     PE (pulmonary thromboembolism) ([facility] [address] )  Assessment & Plan  · Hx of PE/DVT anticoagulated on eliquis    History of TIAs  Assessment & Plan  · Maintained on Eliquis  Hypokalemia  Assessment & Plan  Resolved with current K 4 0  Bipolar depression ([address] )  Assessment & Plan  · Continue home meds  · Stable       VTE Pharmacologic Prophylaxis:   Pharmacologic: Apixaban (Eliquis)  Mechanical VTE Prophylaxis in Place: Yes    Patient Centered Rounds: I have performed bedside rounds with nursing staff today  Discussions with Specialists or Other Care Team Provider:  No    Education and Discussions with Family / Patient:  Yes with the patient at the bedside    Time Spent for Care: 15 minutes  More than 50% of total time spent on counseling and coordination of care as described above  Current Length of Stay: 2 day(s)    Current Patient Status: Inpatient   Certification Statement: The patient will continue to require additional inpatient hospital stay due to Possible discharge to home tomorrow    Discharge Plan:  Possible discharge to home tomorrow    Code Status: Level 1 - Full Code      Subjective:   Patient still complaining of headache rated as a 7 out of a 10    Objective:     Vitals:   Temp (24hrs), Av 2 °F (36 8 °C), Min:98 °F (36 7 °C), Max:98 4 °F (36 9 °C)    Temp:  [98 °F (36 7 °C)-98 4 °F (36 9 °C)] 98 °F (36 7 °C)  HR:  [77-84] 84  Resp:  [18] 18  BP: (130-147)/(61-79) 130/61  SpO2:  [90 %-94 %] 94 %  Body mass index is 35 93 kg/m²  Input and Output Summary (last 24 hours): Intake/Output Summary (Last 24 hours) at 2020 1552  Last data filed at 2020 1715  Gross per 24 hour   Intake 350 ml   Output    Net 350 ml       Physical Exam:     Physical Exam   Constitutional: She is oriented to person, place, and time  She appears well-developed and well-nourished  She appears distressed  HENT:   Head: Normocephalic and atraumatic  Eyes: Pupils are equal, round, and reactive to light  EOM are normal    Neck: Normal range of motion  Neck supple  Cardiovascular: Normal rate, regular rhythm and normal heart sounds  Pulmonary/Chest: Effort normal and breath sounds normal    Abdominal: Soft  Bowel sounds are normal    Musculoskeletal: She exhibits no edema  Neurological: She is alert and oriented to person, place, and time  Skin: Skin is warm and dry  She is not diaphoretic  Additional Data:     Labs:    Results from last 7 days   Lab Units 06/27/20  1843 06/27/20  1400   WBC Thousand/uL  --  8 73   HEMOGLOBIN g/dL  --  13 6   HEMATOCRIT %  --  41 2   PLATELETS Thousands/uL 296 230   NEUTROS PCT %  --  65   LYMPHS PCT %  --  24   MONOS PCT %  --  8   EOS PCT %  --  1     Results from last 7 days   Lab Units 06/30/20  0507 06/29/20  0058   SODIUM mmol/L 143 147*   POTASSIUM mmol/L 4 0 3 5   CHLORIDE mmol/L 106 112*   CO2 mmol/L 29 28   BUN mg/dL 12 14   CREATININE mg/dL 0 87 1 08   ANION GAP mmol/L 8 7   CALCIUM mg/dL 8 8 8 4   ALBUMIN g/dL  --  2 3*   TOTAL BILIRUBIN mg/dL  --  0 15*   ALK PHOS U/L  --  126*   ALT U/L  --  36   AST U/L  --  34   GLUCOSE RANDOM mg/dL 146* 108                           * I Have Reviewed All Lab Data Listed Above  * Additional Pertinent Lab Tests Reviewed:  [address] Admission Reviewed    Imaging:    Imaging Reports Reviewed Today Include:  None available  Imaging Personally Reviewed by Myself Includes:  None    Recent Cultures (last 7 days):           Last 24 Hours Medication List:     Current Facility-Administered Medications:  acetaminophen 650 mg Oral Q6H PRN Diandra Vaca MD    aluminum-magnesium hydroxide-simethicone 30 mL Oral Q4H PRN Alivia Nevarez PA-C    apixaban 5 mg Oral BID Sandy Piger, PA-C    ARIPiprazole 15 mg Oral Daily Sandy Piger, PA-C    diphenhydrAMINE 25 mg Intravenous Q8H Kaye Marin PA-C    fluvoxaMINE 300 mg Oral HS Sandy Piger, PA-C    gabapentin 600 mg Oral TID AVINASH Prabhakar-YO    lamoTRIgine 200 mg Oral Daily Sandy Piger, PA-C    LORazepam 2 mg Oral BID Sandy Piger, PA-C    LORazepam 4 mg Oral HS Mardeen Mood SIRISHA Alas    methylPREDNISolone sodium succinate 500 mg Intravenous QAM Ximena Flores PA-C Last Rate: 500 mg (06/30/20 7697)   metoclopramide 10 mg Intravenous Q8H [address] Kyung Lynn PA-C    mexiletine 150 mg Oral TID Everardo Weeksmaria c Alas PA-C    ondansetron 4 mg Intravenous Q6H PRN Sandy Alas PA-C    valproate (DEPACON) IVPB 1,000 mg Intravenous Q24H [address] Kyung Lynn PA-C Last Rate: 1,000 mg (06/30/20 7992)   zonisamide 200 mg Oral Daily Will Mccormack PA-C         Today, Patient Was Seen By: Davie Hernández MD    ** Please Note: Dictation voice to text software may have been used in the creation of this document   **

## 2020-06-30 NOTE — PLAN OF CARE
Problem: Potential for Falls  Goal: Patient will remain free of falls  Description  INTERVENTIONS:  - Assess patient frequently for physical needs  -  Identify cognitive and physical deficits and behaviors that affect risk of falls    -  [place] fall precautions as indicated by assessment   - Educate patient/family on patient safety including physical limitations  - Instruct patient to call for assistance with activity based on assessment  - Modify environment to reduce risk of injury  - Consider OT/PT consult to assist with strengthening/mobility  Outcome: Progressing     Problem: Prexisting or High Potential for Compromised Skin Integrity  Goal: Skin integrity is maintained or improved  Description  INTERVENTIONS:  - Identify patients at risk for skin breakdown  - Assess and monitor skin integrity  - Assess and monitor nutrition and hydration status  - Monitor labs   - Assess for incontinence   - Turn and reposition patient  - Assist with mobility/ambulation  - Relieve pressure over bony prominences  - Avoid friction and shearing  - Provide appropriate hygiene as needed including keeping skin clean and dry  - Evaluate need for skin moisturizer/barrier cream  - Collaborate with interdisciplinary team   - Patient/family teaching  - Consider wound care consult   Outcome: Progressing     Problem: CARDIOVASCULAR - ADULT  Goal: Maintains optimal cardiac output and hemodynamic stability  Description  INTERVENTIONS:  - Monitor I/O, vital signs and rhythm  - Monitor for S/S and trends of decreased cardiac output  - Administer and titrate ordered vasoactive medications to optimize hemodynamic stability  - Assess quality of pulses, skin color and temperature  - Assess for signs of decreased coronary artery perfusion  - Instruct patient to report change in severity of symptoms  Outcome: Progressing  Goal: Absence of cardiac dysrhythmias or at baseline rhythm  Description  INTERVENTIONS:  - Continuous cardiac monitoring, vital signs, obtain 12 lead EKG if ordered  - Administer antiarrhythmic and heart rate control medications as ordered  - Monitor electrolytes and administer replacement therapy as ordered  Outcome: Progressing     Problem: METABOLIC, FLUID AND ELECTROLYTES - ADULT  Goal: Electrolytes maintained within normal limits  Description  INTERVENTIONS:  - Monitor labs and assess patient for signs and symptoms of electrolyte imbalances  - Administer electrolyte replacement as ordered  - Monitor response to electrolyte replacements, including repeat lab results as appropriate  - Instruct patient on fluid and nutrition as appropriate  Outcome: Progressing  Goal: Fluid balance maintained  Description  INTERVENTIONS:  - Monitor labs   - Monitor I/O and WT  - Instruct patient on fluid and nutrition as appropriate  - Assess for signs & symptoms of volume excess or deficit  Outcome: Progressing     Problem: PAIN - ADULT  Goal: Verbalizes/displays adequate comfort level or baseline comfort level  Description  Interventions:  - Encourage patient to monitor pain and request assistance  - Assess pain using appropriate pain scale  - Administer analgesics based on type and severity of pain and evaluate response  - Implement non-pharmacological measures as appropriate and evaluate response  - Consider cultural and social influences on pain and pain management  - Notify physician/advanced practitioner if interventions unsuccessful or patient reports new pain  Outcome: Progressing     Problem: SAFETY ADULT  Goal: Patient will remain free of falls  Description  INTERVENTIONS:  - Assess patient frequently for physical needs  -  Identify cognitive and physical deficits and behaviors that affect risk of falls    -  [place] fall precautions as indicated by assessment   - Educate patient/family on patient safety including physical limitations  - Instruct patient to call for assistance with activity based on assessment  - Modify environment to reduce risk of injury  - Consider OT/PT consult to assist with strengthening/mobility  Outcome: Progressing     Problem: Knowledge Deficit  Goal: Patient/family/caregiver demonstrates understanding of disease process, treatment plan, medications, and discharge instructions  Description  Complete learning assessment and assess knowledge base    Interventions:  - Provide teaching at level of understanding  - Provide teaching via preferred learning methods  Outcome: Progressing     Problem: GASTROINTESTINAL - ADULT  Goal: Minimal or absence of nausea and/or vomiting  Description  INTERVENTIONS:  - Administer IV fluids if ordered to ensure adequate hydration  - Maintain NPO status until nausea and vomiting are resolved  - Nasogastric tube if ordered  - Administer ordered antiemetic medications as needed  - Provide nonpharmacologic comfort measures as appropriate  - Advance diet as tolerated, if ordered  - Consider nutrition services referral to assist patient with adequate nutrition and appropriate food choices  Outcome: Progressing

## 2020-06-30 NOTE — ASSESSMENT & PLAN NOTE
· Presents after a syncopal event which occurred when she went from a sitting position to a standing position- secondary to orthostatic hypotension + on admission, now resolved with IVFs- component of dehydration contributing  · CT head- no acute intracranial abnormality  · trops negative   · Recent TSH within normal limits  · Hypokalemia repleted and potassium now stable  · Patient should be medically stable for discharge to home tomorrow

## 2020-07-01 VITALS
HEIGHT: 68 IN | TEMPERATURE: 97.7 F | HEART RATE: 75 BPM | OXYGEN SATURATION: 93 % | RESPIRATION RATE: 18 BRPM | BODY MASS INDEX: 35.82 KG/M2 | WEIGHT: 236.33 LBS | DIASTOLIC BLOOD PRESSURE: 78 MMHG | SYSTOLIC BLOOD PRESSURE: 137 MMHG

## 2020-07-01 PROCEDURE — 99239 HOSP IP/OBS DSCHRG MGMT >30: CPT | Performed by: STUDENT IN AN ORGANIZED HEALTH CARE EDUCATION/TRAINING PROGRAM

## 2020-07-01 RX ORDER — MECLIZINE HCL 12.5 MG/1
12.5 TABLET ORAL EVERY 8 HOURS PRN
Qty: 21 TABLET | Refills: 0 | Status: SHIPPED | OUTPATIENT
Start: 2020-07-01

## 2020-07-01 RX ADMIN — ONDANSETRON 4 MG: 2 INJECTION INTRAMUSCULAR; INTRAVENOUS at 02:20

## 2020-07-01 RX ADMIN — VALPROATE SODIUM 1000 MG: 100 INJECTION, SOLUTION INTRAVENOUS at 08:39

## 2020-07-01 RX ADMIN — GABAPENTIN 600 MG: 300 CAPSULE ORAL at 08:38

## 2020-07-01 RX ADMIN — LORAZEPAM 2 MG: 1 TABLET ORAL at 08:39

## 2020-07-01 RX ADMIN — METOCLOPRAMIDE 10 MG: 5 INJECTION, SOLUTION INTRAMUSCULAR; INTRAVENOUS at 06:00

## 2020-07-01 RX ADMIN — ARIPIPRAZOLE 15 MG: 5 TABLET ORAL at 08:38

## 2020-07-01 RX ADMIN — SODIUM CHLORIDE 500 MG: 0.9 INJECTION, SOLUTION INTRAVENOUS at 09:23

## 2020-07-01 RX ADMIN — ONDANSETRON 4 MG: 2 INJECTION INTRAMUSCULAR; INTRAVENOUS at 08:45

## 2020-07-01 RX ADMIN — MEXILETINE HYDROCHLORIDE 150 MG: 150 CAPSULE ORAL at 08:39

## 2020-07-01 RX ADMIN — ZONISAMIDE 200 MG: 100 CAPSULE ORAL at 08:39

## 2020-07-01 RX ADMIN — LAMOTRIGINE 200 MG: 100 TABLET ORAL at 08:39

## 2020-07-01 RX ADMIN — APIXABAN 5 MG: 5 TABLET, FILM COATED ORAL at 08:39

## 2020-07-01 RX ADMIN — DIPHENHYDRAMINE HYDROCHLORIDE 25 MG: 50 INJECTION, SOLUTION INTRAMUSCULAR; INTRAVENOUS at 05:41

## 2020-07-01 NOTE — PLAN OF CARE
Problem: Potential for Falls  Goal: Patient will remain free of falls  Description  INTERVENTIONS:  - Assess patient frequently for physical needs  -  Identify cognitive and physical deficits and behaviors that affect risk of falls    -  [place] fall precautions as indicated by assessment   - Educate patient/family on patient safety including physical limitations  - Instruct patient to call for assistance with activity based on assessment  - Modify environment to reduce risk of injury  - Consider OT/PT consult to assist with strengthening/mobility  7/1/2020 1438 by Tani Wisdom RN  Outcome: Adequate for Discharge  7/1/2020 0755 by Tani Wisdom RN  Outcome: Progressing     Problem: Prexisting or High Potential for Compromised Skin Integrity  Goal: Skin integrity is maintained or improved  Description  INTERVENTIONS:  - Identify patients at risk for skin breakdown  - Assess and monitor skin integrity  - Assess and monitor nutrition and hydration status  - Monitor labs   - Assess for incontinence   - Turn and reposition patient  - Assist with mobility/ambulation  - Relieve pressure over bony prominences  - Avoid friction and shearing  - Provide appropriate hygiene as needed including keeping skin clean and dry  - Evaluate need for skin moisturizer/barrier cream  - Collaborate with interdisciplinary team   - Patient/family teaching  - Consider wound care consult   7/1/2020 1438 by Tani Wisdom RN  Outcome: Adequate for Discharge  7/1/2020 0755 by Tani Wisdom RN  Outcome: Progressing     Problem: CARDIOVASCULAR - ADULT  Goal: Maintains optimal cardiac output and hemodynamic stability  Description  INTERVENTIONS:  - Monitor I/O, vital signs and rhythm  - Monitor for S/S and trends of decreased cardiac output  - Administer and titrate ordered vasoactive medications to optimize hemodynamic stability  - Assess quality of pulses, skin color and temperature  - Assess for signs of decreased coronary artery perfusion  - Instruct patient to report change in severity of symptoms  7/1/2020 1438 by Antonia Bradley RN  Outcome: Adequate for Discharge  7/1/2020 0755 by Antonia Bradley RN  Outcome: Progressing  Goal: Absence of cardiac dysrhythmias or at baseline rhythm  Description  INTERVENTIONS:  - Continuous cardiac monitoring, vital signs, obtain 12 lead EKG if ordered  - Administer antiarrhythmic and heart rate control medications as ordered  - Monitor electrolytes and administer replacement therapy as ordered  7/1/2020 1438 by Antonia Bradley RN  Outcome: Adequate for Discharge  7/1/2020 0755 by Antonia Bradley RN  Outcome: Progressing     Problem: METABOLIC, FLUID AND ELECTROLYTES - ADULT  Goal: Electrolytes maintained within normal limits  Description  INTERVENTIONS:  - Monitor labs and assess patient for signs and symptoms of electrolyte imbalances  - Administer electrolyte replacement as ordered  - Monitor response to electrolyte replacements, including repeat lab results as appropriate  - Instruct patient on fluid and nutrition as appropriate  7/1/2020 1438 by Antonia Bradley RN  Outcome: Adequate for Discharge  7/1/2020 0755 by Antonia Bradley RN  Outcome: Progressing  Goal: Fluid balance maintained  Description  INTERVENTIONS:  - Monitor labs   - Monitor I/O and WT  - Instruct patient on fluid and nutrition as appropriate  - Assess for signs & symptoms of volume excess or deficit  7/1/2020 1438 by Antonia Bradley RN  Outcome: Adequate for Discharge  7/1/2020 0755 by Antonia Bradley RN  Outcome: Progressing     Problem: PAIN - ADULT  Goal: Verbalizes/displays adequate comfort level or baseline comfort level  Description  Interventions:  - Encourage patient to monitor pain and request assistance  - Assess pain using appropriate pain scale  - Administer analgesics based on type and severity of pain and evaluate response  - Implement non-pharmacological measures as appropriate and evaluate response  - Consider cultural and social influences on pain and pain management  - Notify physician/advanced practitioner if interventions unsuccessful or patient reports new pain  7/1/2020 1438 by Rika Ron RN  Outcome: Adequate for Discharge  7/1/2020 0755 by Rika Ron RN  Outcome: Progressing     Problem: SAFETY ADULT  Goal: Patient will remain free of falls  Description  INTERVENTIONS:  - Assess patient frequently for physical needs  -  Identify cognitive and physical deficits and behaviors that affect risk of falls  -  [place] fall precautions as indicated by assessment   - Educate patient/family on patient safety including physical limitations  - Instruct patient to call for assistance with activity based on assessment  - Modify environment to reduce risk of injury  - Consider OT/PT consult to assist with strengthening/mobility  7/1/2020 1438 by Rika Ron RN  Outcome: Adequate for Discharge  7/1/2020 0755 by Rika Ron RN  Outcome: Progressing     Problem: Knowledge Deficit  Goal: Patient/family/caregiver demonstrates understanding of disease process, treatment plan, medications, and discharge instructions  Description  Complete learning assessment and assess knowledge base    Interventions:  - Provide teaching at level of understanding  - Provide teaching via preferred learning methods  7/1/2020 1438 by Rika Ron RN  Outcome: Adequate for Discharge  7/1/2020 0755 by Rika Ron RN  Outcome: Progressing     Problem: GASTROINTESTINAL - ADULT  Goal: Minimal or absence of nausea and/or vomiting  Description  INTERVENTIONS:  - Administer IV fluids if ordered to ensure adequate hydration  - Maintain NPO status until nausea and vomiting are resolved  - Nasogastric tube if ordered  - Administer ordered antiemetic medications as needed  - Provide nonpharmacologic comfort measures as appropriate  - Advance diet as tolerated, if ordered  - Consider nutrition services referral to assist patient with adequate nutrition and appropriate food choices  7/1/2020 1438 by Briana Jameson RN  Outcome: Adequate for Discharge  7/1/2020 0755 by Briana Jameson RN  Outcome: Progressing

## 2020-07-01 NOTE — NURSING NOTE
Patient discharged to home  Discharge instructions were reviewed with patient  IV was removed  Patient was escorted out via wheelchair accompanied by PCA

## 2020-07-01 NOTE — NURSING NOTE
Patient received lying in bed Comfortably  No s/s of distress noted  Patient stated to this nurse that she is excited to finally go home tomorrow  Agree with previous nurse's assessment

## 2020-07-01 NOTE — PLAN OF CARE
Problem: Potential for Falls  Goal: Patient will remain free of falls  Description  INTERVENTIONS:  - Assess patient frequently for physical needs  -  Identify cognitive and physical deficits and behaviors that affect risk of falls    -  [place] fall precautions as indicated by assessment   - Educate patient/family on patient safety including physical limitations  - Instruct patient to call for assistance with activity based on assessment  - Modify environment to reduce risk of injury  - Consider OT/PT consult to assist with strengthening/mobility  Outcome: Progressing     Problem: Prexisting or High Potential for Compromised Skin Integrity  Goal: Skin integrity is maintained or improved  Description  INTERVENTIONS:  - Identify patients at risk for skin breakdown  - Assess and monitor skin integrity  - Assess and monitor nutrition and hydration status  - Monitor labs   - Assess for incontinence   - Turn and reposition patient  - Assist with mobility/ambulation  - Relieve pressure over bony prominences  - Avoid friction and shearing  - Provide appropriate hygiene as needed including keeping skin clean and dry  - Evaluate need for skin moisturizer/barrier cream  - Collaborate with interdisciplinary team   - Patient/family teaching  - Consider wound care consult   Outcome: Progressing     Problem: CARDIOVASCULAR - ADULT  Goal: Maintains optimal cardiac output and hemodynamic stability  Description  INTERVENTIONS:  - Monitor I/O, vital signs and rhythm  - Monitor for S/S and trends of decreased cardiac output  - Administer and titrate ordered vasoactive medications to optimize hemodynamic stability  - Assess quality of pulses, skin color and temperature  - Assess for signs of decreased coronary artery perfusion  - Instruct patient to report change in severity of symptoms  Outcome: Progressing  Goal: Absence of cardiac dysrhythmias or at baseline rhythm  Description  INTERVENTIONS:  - Continuous cardiac monitoring, vital signs, obtain 12 lead EKG if ordered  - Administer antiarrhythmic and heart rate control medications as ordered  - Monitor electrolytes and administer replacement therapy as ordered  Outcome: Progressing     Problem: METABOLIC, FLUID AND ELECTROLYTES - ADULT  Goal: Electrolytes maintained within normal limits  Description  INTERVENTIONS:  - Monitor labs and assess patient for signs and symptoms of electrolyte imbalances  - Administer electrolyte replacement as ordered  - Monitor response to electrolyte replacements, including repeat lab results as appropriate  - Instruct patient on fluid and nutrition as appropriate  Outcome: Progressing  Goal: Fluid balance maintained  Description  INTERVENTIONS:  - Monitor labs   - Monitor I/O and WT  - Instruct patient on fluid and nutrition as appropriate  - Assess for signs & symptoms of volume excess or deficit  Outcome: Progressing     Problem: PAIN - ADULT  Goal: Verbalizes/displays adequate comfort level or baseline comfort level  Description  Interventions:  - Encourage patient to monitor pain and request assistance  - Assess pain using appropriate pain scale  - Administer analgesics based on type and severity of pain and evaluate response  - Implement non-pharmacological measures as appropriate and evaluate response  - Consider cultural and social influences on pain and pain management  - Notify physician/advanced practitioner if interventions unsuccessful or patient reports new pain  Outcome: Progressing     Problem: SAFETY ADULT  Goal: Patient will remain free of falls  Description  INTERVENTIONS:  - Assess patient frequently for physical needs  -  Identify cognitive and physical deficits and behaviors that affect risk of falls    -  [place] fall precautions as indicated by assessment   - Educate patient/family on patient safety including physical limitations  - Instruct patient to call for assistance with activity based on assessment  - Modify environment to reduce risk of injury  - Consider OT/PT consult to assist with strengthening/mobility  Outcome: Progressing     Problem: Knowledge Deficit  Goal: Patient/family/caregiver demonstrates understanding of disease process, treatment plan, medications, and discharge instructions  Description  Complete learning assessment and assess knowledge base    Interventions:  - Provide teaching at level of understanding  - Provide teaching via preferred learning methods  Outcome: Progressing     Problem: GASTROINTESTINAL - ADULT  Goal: Minimal or absence of nausea and/or vomiting  Description  INTERVENTIONS:  - Administer IV fluids if ordered to ensure adequate hydration  - Maintain NPO status until nausea and vomiting are resolved  - Nasogastric tube if ordered  - Administer ordered antiemetic medications as needed  - Provide nonpharmacologic comfort measures as appropriate  - Advance diet as tolerated, if ordered  - Consider nutrition services referral to assist patient with adequate nutrition and appropriate food choices  Outcome: Progressing

## 2020-07-01 NOTE — DISCHARGE SUMMARY
Discharge- Margarita Maki 1960, 62 y o  female MRN: [id number]    Unit/Bed#: [address] 87 203-01 Encounter: [id number]    Primary Care Provider: Mannie Sutton DO   Date and time admitted to hospital: 6/27/2020 12:25 PM        * Syncope  Assessment & Plan  56-year-old female admitted due to syncope possibly orthostatic as a result of improvement with IV hydration  CT head without any acute intracranial abnormalities  TSH within normal limits  Electrolytes repleted  - outpatient PCP follow-up and [facility] follow-up  Migraine  Assessment & Plan  Hx of migraines with high utilizer plan  As per Neurology and previous provider:  · Patient requested Neurology consultation and at the conclusion of their evaluation she is on the current regimen:  ? Methylprednisolone 500 mg daily x3 days to end tomorrow 7/1   ? Reglan 10 mg Q 8 hours x3 days to end tomorrow 7/1   ? Benadryl 25 mg Q 8 hours  ? Depacon 1000 mg daily - max 3 days  to end tomorrow 7/1   ? Continue fluids    - Continue home migraine medications:  Gabapentin 600 mg TID, Lamictal 200 mg daily, and Zonegran 200 mg daily  - Do not recommend transfer to [facility] for ketamine infusion, as patient has received in the past, due to high user advisory note  - Avoid narcotics or CNS sedating agents   - Follow up with [facility] headache clinic as an outpatient   - Likely stable for discharge tomorrow morning    PE (pulmonary thromboembolism) ([facility] [address] )  Assessment & Plan  Continue Eliquis    Bipolar depression ([facility] [address] )  Assessment & Plan  Denies any suicidal or homicidal ideation  Continue home meds      Hypokalemia  Assessment & Plan  Resolved  Recent Labs     06/28/20  1254 06/29/20  0058 06/30/20  0507   K 3 0* 3 5 4 0         Class 2 obesity due to excess calories with body mass index (BMI) of 35 0 to 35 9 in adult  Assessment & Plan  Counseled regarding lifestyle modification      Discharging Physician / Practitioner: Adelaida Fair MD  PCP: Sandra Cleveland Lupe, DO  Admission Date:   Admission Orders (From admission, onward)     Ordered        06/28/20 1031  Inpatient Admission  Once         06/27/20 1613  Place in Observation  Once                   Discharge Date: 07/01/20    Resolved Problems  Date Reviewed: 7/1/2020    None          Consultations During Hospital Stay:  · Neurology    Procedures Performed:   · None    Significant Findings / Test Results:   · Imaging  · CT head wo contrast:  No acute intracranial abnormality  · XR Chest    No acute cardiopulmonary disease      No displaced right rib fracture or pneumothorax      Incidental Findings:   · None    Test Results Pending at Discharge (will require follow up): · None     Outpatient Tests Requested:  · Outpatient headache clinic followup with [facility], PCP    Complications:  None    Reason for Admission: Syncope    Hospital Course:     Amelia Meigs is a 62 y o  female patient who originally presented to the hospital on 6/27/2020 due to Syncope  70-year-old female was admitted after suffering from a syncopal event likely secondary to orthostatic hypotension  She improved after she was given IV hydration  Serial troponins negative  Electrolyte abnormalities have been replated  CT head not showing any acute intracranial abnormality  Neurology was consulted to assist in her management and her headache  She was given a migraine regimen by neurology which led to an improvement of her headache  Her headache is now resolved  She is eager to go home and agrees to follow-up with her PCP and her outpatient headache specialist at [facility]  Patient agrees to follow-up with her providers as scheduled and to take her medications as prescribed  All questions were addressed      she understood the need to seek immediate medical attention should she develop any worsening headache, weakness espescially focal deficits, chest pain, shortness of breath, severe pain, fever, chills, or any other concerning symptoms  Please see above list of diagnoses and related plan for additional information  Condition at Discharge: fair     Discharge Day Visit / Exam:     Subjective:  Patient seen and examined at bedside  No acute events or complaints overnight  Currently pain-free  Vitals: Blood Pressure: 137/78 (07/01/20 0738)  Pulse: 75 (07/01/20 0738)  Temperature: 97 7 °F (36 5 °C) (07/01/20 0738)  Temp Source: Oral (06/30/20 1515)  Respirations: 18 (07/01/20 0738)  Height: 5' 8" (172 7 cm) (06/27/20 1714)  Weight - Scale: 107 kg (236 lb 5 3 oz) (06/27/20 1714)  SpO2: 93 % (07/01/20 0738)  Exam:   Physical Exam   Constitutional: No distress  HENT:   Head: Normocephalic and atraumatic  Eyes: Pupils are equal, round, and reactive to light  EOM are normal    Neck: No JVD present  Cardiovascular: Normal rate, regular rhythm, normal heart sounds and intact distal pulses  Exam reveals no gallop and no friction rub  No murmur heard  Pulmonary/Chest: Effort normal and breath sounds normal  No stridor  No respiratory distress  She has no wheezes  She has no rales  Abdominal: Soft  Bowel sounds are normal  She exhibits no distension  There is no tenderness  There is no guarding  Musculoskeletal: Normal range of motion  Neurological: She is alert  Skin: Skin is warm and dry  Psychiatric: She has a normal mood and affect  Vitals reviewed  Discharge instructions/Information to patient and family:   See after visit summary for information provided to patient and family  Provisions for Follow-Up Care:  See after visit summary for information related to follow-up care and any pertinent home health orders  Disposition:     Home    For Discharges to [facility]:   · Not Applicable to this Patient - Not Applicable to this Patient    Planned Readmission: No     Discharge Statement:  I spent 40 minutes discharging the patient  This time was spent on the day of discharge   I had direct contact with the patient on the day of discharge  Greater than 50% of the total time was spent examining patient, answering all patient questions, arranging and discussing plan of care with patient as well as directly providing post-discharge instructions  Additional time then spent on discharge activities  Discharge Medications:  See after visit summary for reconciled discharge medications provided to patient and family        ** Please Note: This note has been constructed using a voice recognition system **

## 2020-07-01 NOTE — DISCHARGE INSTRUCTIONS
Acute Headache   WHAT YOU NEED TO KNOW:   An acute headache is pain or discomfort that starts suddenly and gets worse quickly  You may have an acute headache only when you feel stress or eat certain foods  Other acute headache pain can happen every day, and sometimes several times a day  DISCHARGE INSTRUCTIONS:   Seek care immediately if:   · You have severe pain  · You have numbness or weakness on one side of your face or body  · You have a headache that occurs after a blow to the head, a fall, or other trauma  · You have a headache, are forgetful or confused, or have trouble speaking  · You have a headache, stiff neck, and a fever  Contact your healthcare provider if:   · You have a constant headache and are vomiting  · You have a headache each day that does not get better, even after treatment  · You have changes in your headaches, or new symptoms that occur when you have a headache  · You have questions or concerns about your condition or care  Medicines: You may need any of the following:  · Prescription pain medicine  may be given  The medicine your healthcare provider recommends will depend on the kind of headaches you have  You will need to take prescription headache medicines as directed to prevent a problem called rebound headache  These headaches happen with regular use of pain relievers for headache disorders  · NSAIDs , such as ibuprofen, help decrease swelling, pain, and fever  This medicine is available with or without a doctor's order  NSAIDs can cause stomach bleeding or kidney problems in certain people  If you take blood thinner medicine, always ask your healthcare provider if NSAIDs are safe for you  Always read the medicine label and follow directions  · Acetaminophen  decreases pain and fever  It is available without a doctor's order  Ask how much to take and how often to take it  Follow directions   Read the labels of all other medicines you are using to see if they also contain acetaminophen, or ask your doctor or pharmacist  Acetaminophen can cause liver damage if not taken correctly  Do not use more than 3 grams (3,000 milligrams) total of acetaminophen in one day  · Antidepressants  may be given for some kinds of headaches  · Take your medicine as directed  Contact your healthcare provider if you think your medicine is not helping or if you have side effects  Tell him or her if you are allergic to any medicine  Keep a list of the medicines, vitamins, and herbs you take  Include the amounts, and when and why you take them  Bring the list or the pill bottles to follow-up visits  Carry your medicine list with you in case of an emergency  Manage your symptoms:   · Apply heat or ice  on the headache area  Use a heat or ice pack  For an ice pack, you can also put crushed ice in a plastic bag  Cover the pack or bag with a towel before you apply it to your skin  Ice and heat both help decrease pain, and heat also helps decrease muscle spasms  Apply heat for 20 to 30 minutes every 2 hours  Apply ice for 15 to 20 minutes every hour  Apply heat or ice for as long and for as many days as directed  You may alternate heat and ice  · Relax your muscles  Lie down in a comfortable position and close your eyes  Relax your muscles slowly  Start at your toes and work your way up your body  · Keep a record of your headaches  Write down when your headaches start and stop  Include your symptoms and what you were doing when the headache began  Record what you ate or drank for 24 hours before the headache started  Describe the pain and where it hurts  Keep track of what you did to treat your headache and if it worked  Prevent an acute headache:   · Avoid anything that triggers an acute headache  Examples include exposure to chemicals, going to high altitude, or not getting enough sleep  Create a regular sleep routine   Go to sleep at the same time and wake up at the same time each day  Do not use electronic devices before bedtime  These may trigger a headache or prevent you from sleeping well  · Do not smoke  Nicotine and other chemicals in cigarettes and cigars can trigger an acute headache or make it worse  Ask your healthcare provider for information if you currently smoke and need help to quit  E-cigarettes or smokeless tobacco still contain nicotine  Talk to your healthcare provider before you use these products  · Limit alcohol as directed  Alcohol can trigger an acute headache or make it worse  If you have cluster headaches, do not drink alcohol during an episode  For other types of headaches, ask your healthcare provider if it is safe for you to drink alcohol  Ask how much is safe for you to drink, and how often  · Exercise as directed  Exercise can reduce tension and help with headache pain  Aim for 30 minutes of physical activity on most days of the week  Your healthcare provider can help you create an exercise plan  · Eat a variety of healthy foods  Healthy foods include fruits, vegetables, low-fat dairy products, lean meats, fish, whole grains, and cooked beans  Your healthcare provider or dietitian can help you create meals plans if you need to avoid foods that trigger headaches  Follow up with your healthcare provider as directed:  Bring your headache record with you when you see your healthcare provider  Write down your questions so you remember to ask them during your visits  © 2017 [facility] Information is for End User's use only and may not be sold, redistributed or otherwise used for commercial purposes  All illustrations and images included in CareNotes® are the copyrighted property of A D A M , Inc  or [employer] [place]  The above information is an  only  It is not intended as medical advice for individual conditions or treatments   Talk to your doctor, nurse or pharmacist before following any medical regimen to see if it is safe and effective for you

## 2020-07-01 NOTE — ASSESSMENT & PLAN NOTE
Hx of migraines with high utilizer plan  As per Neurology and previous provider:  · Patient requested Neurology consultation and at the conclusion of their evaluation she is on the current regimen:  ? Methylprednisolone 500 mg daily x3 days to end tomorrow 7/1   ? Reglan 10 mg Q 8 hours x3 days to end tomorrow 7/1   ? Benadryl 25 mg Q 8 hours  ? Depacon 1000 mg daily - max 3 days  to end tomorrow 7/1   ? Continue fluids    - Continue home migraine medications:  Gabapentin 600 mg TID, Lamictal 200 mg daily, and Zonegran 200 mg daily  - Do not recommend transfer to [facility] [place] for ketamine infusion, as patient has received in the past, due to high user advisory note    - Avoid narcotics or CNS sedating agents   - Follow up with [facility] headache clinic as an outpatient   - Likely stable for discharge tomorrow morning

## 2020-07-01 NOTE — PLAN OF CARE
Problem: Potential for Falls  Goal: Patient will remain free of falls  Description  INTERVENTIONS:  - Assess patient frequently for physical needs  -  Identify cognitive and physical deficits and behaviors that affect risk of falls    -  [place] fall precautions as indicated by assessment   - Educate patient/family on patient safety including physical limitations  - Instruct patient to call for assistance with activity based on assessment  - Modify environment to reduce risk of injury  - Consider OT/PT consult to assist with strengthening/mobility  Outcome: Progressing     Problem: Prexisting or High Potential for Compromised Skin Integrity  Goal: Skin integrity is maintained or improved  Description  INTERVENTIONS:  - Identify patients at risk for skin breakdown  - Assess and monitor skin integrity  - Assess and monitor nutrition and hydration status  - Monitor labs   - Assess for incontinence   - Turn and reposition patient  - Assist with mobility/ambulation  - Relieve pressure over bony prominences  - Avoid friction and shearing  - Provide appropriate hygiene as needed including keeping skin clean and dry  - Evaluate need for skin moisturizer/barrier cream  - Collaborate with interdisciplinary team   - Patient/family teaching  - Consider wound care consult   Outcome: Progressing     Problem: CARDIOVASCULAR - ADULT  Goal: Maintains optimal cardiac output and hemodynamic stability  Description  INTERVENTIONS:  - Monitor I/O, vital signs and rhythm  - Monitor for S/S and trends of decreased cardiac output  - Administer and titrate ordered vasoactive medications to optimize hemodynamic stability  - Assess quality of pulses, skin color and temperature  - Assess for signs of decreased coronary artery perfusion  - Instruct patient to report change in severity of symptoms  Outcome: Progressing  Goal: Absence of cardiac dysrhythmias or at baseline rhythm  Description  INTERVENTIONS:  - Continuous cardiac monitoring, vital signs, obtain 12 lead EKG if ordered  - Administer antiarrhythmic and heart rate control medications as ordered  - Monitor electrolytes and administer replacement therapy as ordered  Outcome: Progressing     Problem: METABOLIC, FLUID AND ELECTROLYTES - ADULT  Goal: Electrolytes maintained within normal limits  Description  INTERVENTIONS:  - Monitor labs and assess patient for signs and symptoms of electrolyte imbalances  - Administer electrolyte replacement as ordered  - Monitor response to electrolyte replacements, including repeat lab results as appropriate  - Instruct patient on fluid and nutrition as appropriate  Outcome: Progressing  Goal: Fluid balance maintained  Description  INTERVENTIONS:  - Monitor labs   - Monitor I/O and WT  - Instruct patient on fluid and nutrition as appropriate  - Assess for signs & symptoms of volume excess or deficit  Outcome: Progressing     Problem: PAIN - ADULT  Goal: Verbalizes/displays adequate comfort level or baseline comfort level  Description  Interventions:  - Encourage patient to monitor pain and request assistance  - Assess pain using appropriate pain scale  - Administer analgesics based on type and severity of pain and evaluate response  - Implement non-pharmacological measures as appropriate and evaluate response  - Consider cultural and social influences on pain and pain management  - Notify physician/advanced practitioner if interventions unsuccessful or patient reports new pain  Outcome: Progressing     Problem: SAFETY ADULT  Goal: Patient will remain free of falls  Description  INTERVENTIONS:  - Assess patient frequently for physical needs  -  Identify cognitive and physical deficits and behaviors that affect risk of falls    -  [place] fall precautions as indicated by assessment   - Educate patient/family on patient safety including physical limitations  - Instruct patient to call for assistance with activity based on assessment  - Modify environment to reduce risk of injury  - Consider OT/PT consult to assist with strengthening/mobility  Outcome: Progressing     Problem: Knowledge Deficit  Goal: Patient/family/caregiver demonstrates understanding of disease process, treatment plan, medications, and discharge instructions  Description  Complete learning assessment and assess knowledge base    Interventions:  - Provide teaching at level of understanding  - Provide teaching via preferred learning methods  Outcome: Progressing     Problem: GASTROINTESTINAL - ADULT  Goal: Minimal or absence of nausea and/or vomiting  Description  INTERVENTIONS:  - Administer IV fluids if ordered to ensure adequate hydration  - Maintain NPO status until nausea and vomiting are resolved  - Nasogastric tube if ordered  - Administer ordered antiemetic medications as needed  - Provide nonpharmacologic comfort measures as appropriate  - Advance diet as tolerated, if ordered  - Consider nutrition services referral to assist patient with adequate nutrition and appropriate food choices  Outcome: Progressing

## 2020-07-01 NOTE — ASSESSMENT & PLAN NOTE
70-year-old female admitted due to syncope possibly orthostatic as a result of improvement with IV hydration  CT head without any acute intracranial abnormalities  TSH within normal limits  Electrolytes repleted  - outpatient PCP follow-up and [facility] follow-up

## 2020-07-03 ENCOUNTER — HOSPITAL ENCOUNTER (EMERGENCY)
Facility: HOSPITAL | Age: 60
DRG: 511 | End: 2020-07-03
Attending: EMERGENCY MEDICINE | Admitting: EMERGENCY MEDICINE
Payer: COMMERCIAL

## 2020-07-03 ENCOUNTER — APPOINTMENT (INPATIENT)
Dept: RADIOLOGY | Facility: HOSPITAL | Age: 60
DRG: 511 | End: 2020-07-03
Payer: COMMERCIAL

## 2020-07-03 ENCOUNTER — APPOINTMENT (EMERGENCY)
Dept: RADIOLOGY | Facility: HOSPITAL | Age: 60
DRG: 511 | End: 2020-07-03
Payer: COMMERCIAL

## 2020-07-03 ENCOUNTER — APPOINTMENT (EMERGENCY)
Dept: CT IMAGING | Facility: HOSPITAL | Age: 60
DRG: 511 | End: 2020-07-03
Payer: COMMERCIAL

## 2020-07-03 ENCOUNTER — HOSPITAL ENCOUNTER (INPATIENT)
Facility: HOSPITAL | Age: 60
LOS: 6 days | Discharge: NURSING, REHAB OR LONG-TERM CARE | DRG: 511 | End: 2020-07-09
Attending: SURGERY | Admitting: SURGERY
Payer: COMMERCIAL

## 2020-07-03 VITALS
BODY MASS INDEX: 37.28 KG/M2 | RESPIRATION RATE: 17 BRPM | OXYGEN SATURATION: 95 % | SYSTOLIC BLOOD PRESSURE: 143 MMHG | WEIGHT: 245.15 LBS | TEMPERATURE: 99.4 F | HEART RATE: 91 BPM | DIASTOLIC BLOOD PRESSURE: 77 MMHG

## 2020-07-03 DIAGNOSIS — S52.502A CLOSED FRACTURE OF DISTAL ENDS OF LEFT RADIUS AND ULNA, INITIAL ENCOUNTER: Primary | ICD-10-CM

## 2020-07-03 DIAGNOSIS — S06.0X9A CONCUSSION: ICD-10-CM

## 2020-07-03 DIAGNOSIS — S52.602A CLOSED FRACTURE OF DISTAL ENDS OF LEFT RADIUS AND ULNA, INITIAL ENCOUNTER: Primary | ICD-10-CM

## 2020-07-03 DIAGNOSIS — S27.329A PULMONARY CONTUSION: ICD-10-CM

## 2020-07-03 DIAGNOSIS — S09.90XA CHI (CLOSED HEAD INJURY): Primary | ICD-10-CM

## 2020-07-03 DIAGNOSIS — S22.49XA MULTIPLE RIB FRACTURES: ICD-10-CM

## 2020-07-03 DIAGNOSIS — S16.1XXA CERVICAL STRAIN, ACUTE: ICD-10-CM

## 2020-07-03 DIAGNOSIS — T07.XXXA MULTIPLE CONTUSIONS: ICD-10-CM

## 2020-07-03 DIAGNOSIS — S52.92XA LEFT FOREARM FRACTURE, CLOSED, INITIAL ENCOUNTER: ICD-10-CM

## 2020-07-03 DIAGNOSIS — F41.9 ANXIETY: ICD-10-CM

## 2020-07-03 DIAGNOSIS — S22.079A T9 VERTEBRAL FRACTURE (HCC): ICD-10-CM

## 2020-07-03 PROBLEM — S00.03XA: Status: ACTIVE | Noted: 2020-07-03

## 2020-07-03 PROBLEM — S27.322A BILATERAL PULMONARY CONTUSION: Status: ACTIVE | Noted: 2020-07-03
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
## 2020-07-03 PROBLEM — S22.42XA FRACTURE OF MULTIPLE RIBS OF LEFT SIDE: Status: ACTIVE | Noted: 2020-07-03

## 2020-07-03 LAB
ANION GAP SERPL CALCULATED.3IONS-SCNC: 6 MMOL/L (ref 4–13)
BASOPHILS # BLD AUTO: 0.02 THOUSANDS/ΜL (ref 0–0.1)
BASOPHILS NFR BLD AUTO: 0 % (ref 0–1)
BUN SERPL-MCNC: 24 MG/DL (ref 5–25)
CALCIUM SERPL-MCNC: 8.1 MG/DL (ref 8.3–10.1)
CHLORIDE SERPL-SCNC: 102 MMOL/L (ref 100–108)
CO2 SERPL-SCNC: 33 MMOL/L (ref 21–32)
CREAT SERPL-MCNC: 1.29 MG/DL (ref 0.6–1.3)
EOSINOPHIL # BLD AUTO: 0.01 THOUSAND/ΜL (ref 0–0.61)
EOSINOPHIL NFR BLD AUTO: 0 % (ref 0–6)
ERYTHROCYTE [DISTWIDTH] IN BLOOD BY AUTOMATED COUNT: 14 % (ref 11.6–15.1)
GFR SERPL CREATININE-BSD FRML MDRD: 45 ML/MIN/1.73SQ M
GLUCOSE SERPL-MCNC: 100 MG/DL (ref 65–140)
HCT VFR BLD AUTO: 35.9 % (ref 34.8–46.1)
HGB BLD-MCNC: 11.8 G/DL (ref 11.5–15.4)
IMM GRANULOCYTES # BLD AUTO: 0.15 THOUSAND/UL (ref 0–0.2)
IMM GRANULOCYTES NFR BLD AUTO: 1 % (ref 0–2)
LYMPHOCYTES # BLD AUTO: 1.34 THOUSANDS/ΜL (ref 0.6–4.47)
LYMPHOCYTES NFR BLD AUTO: 10 % (ref 14–44)
MCH RBC QN AUTO: 32.1 PG (ref 26.8–34.3)
MCHC RBC AUTO-ENTMCNC: 32.9 G/DL (ref 31.4–37.4)
MCV RBC AUTO: 98 FL (ref 82–98)
MONOCYTES # BLD AUTO: 1.32 THOUSAND/ΜL (ref 0.17–1.22)
MONOCYTES NFR BLD AUTO: 10 % (ref 4–12)
NEUTROPHILS # BLD AUTO: 10.67 THOUSANDS/ΜL (ref 1.85–7.62)
NEUTS SEG NFR BLD AUTO: 79 % (ref 43–75)
NRBC BLD AUTO-RTO: 0 /100 WBCS
PLATELET # BLD AUTO: 234 THOUSANDS/UL (ref 149–390)
PMV BLD AUTO: 10.4 FL (ref 8.9–12.7)
POTASSIUM SERPL-SCNC: 2.5 MMOL/L (ref 3.5–5.3)
RBC # BLD AUTO: 3.68 MILLION/UL (ref 3.81–5.12)
SARS-COV-2 RNA RESP QL NAA+PROBE: NEGATIVE
SODIUM SERPL-SCNC: 141 MMOL/L (ref 136–145)
WBC # BLD AUTO: 13.51 THOUSAND/UL (ref 4.31–10.16)

## 2020-07-03 PROCEDURE — 99291 CRITICAL CARE FIRST HOUR: CPT | Performed by: EMERGENCY MEDICINE

## 2020-07-03 PROCEDURE — 96365 THER/PROPH/DIAG IV INF INIT: CPT

## 2020-07-03 PROCEDURE — 96366 THER/PROPH/DIAG IV INF ADDON: CPT

## 2020-07-03 PROCEDURE — 70450 CT HEAD/BRAIN W/O DYE: CPT

## 2020-07-03 PROCEDURE — 96375 TX/PRO/DX INJ NEW DRUG ADDON: CPT

## 2020-07-03 PROCEDURE — 73090 X-RAY EXAM OF FOREARM: CPT

## 2020-07-03 PROCEDURE — 72125 CT NECK SPINE W/O DYE: CPT

## 2020-07-03 PROCEDURE — 93005 ELECTROCARDIOGRAM TRACING: CPT

## 2020-07-03 PROCEDURE — 80048 BASIC METABOLIC PNL TOTAL CA: CPT | Performed by: EMERGENCY MEDICINE

## 2020-07-03 PROCEDURE — 99285 EMERGENCY DEPT VISIT HI MDM: CPT

## 2020-07-03 PROCEDURE — 73120 X-RAY EXAM OF HAND: CPT

## 2020-07-03 PROCEDURE — 85025 COMPLETE CBC W/AUTO DIFF WBC: CPT | Performed by: EMERGENCY MEDICINE

## 2020-07-03 PROCEDURE — 74177 CT ABD & PELVIS W/CONTRAST: CPT

## 2020-07-03 PROCEDURE — 73110 X-RAY EXAM OF WRIST: CPT

## 2020-07-03 PROCEDURE — 71260 CT THORAX DX C+: CPT

## 2020-07-03 PROCEDURE — 99284 EMERGENCY DEPT VISIT MOD MDM: CPT

## 2020-07-03 PROCEDURE — 96374 THER/PROPH/DIAG INJ IV PUSH: CPT

## 2020-07-03 PROCEDURE — 87635 SARS-COV-2 COVID-19 AMP PRB: CPT | Performed by: EMERGENCY MEDICINE

## 2020-07-03 PROCEDURE — 99222 1ST HOSP IP/OBS MODERATE 55: CPT | Performed by: SURGERY

## 2020-07-03 RX ORDER — MEXILETINE HYDROCHLORIDE 150 MG/1
150 CAPSULE ORAL 3 TIMES DAILY
Status: DISCONTINUED | OUTPATIENT
Start: 2020-07-03 | End: 2020-07-09 | Stop reason: HOSPADM

## 2020-07-03 RX ORDER — CEFAZOLIN SODIUM 1 G/50ML
1000 SOLUTION INTRAVENOUS
Status: CANCELLED | OUTPATIENT
Start: 2020-07-04 | End: 2020-07-05

## 2020-07-03 RX ORDER — ARIPIPRAZOLE 15 MG/1
7.5 TABLET ORAL DAILY
Status: DISCONTINUED | OUTPATIENT
Start: 2020-07-04 | End: 2020-07-09 | Stop reason: HOSPADM

## 2020-07-03 RX ORDER — MELATONIN
5000 DAILY
Status: DISCONTINUED | OUTPATIENT
Start: 2020-07-04 | End: 2020-07-09 | Stop reason: HOSPADM

## 2020-07-03 RX ORDER — OXYCODONE HYDROCHLORIDE 5 MG/1
5 TABLET ORAL EVERY 4 HOURS PRN
Status: DISCONTINUED | OUTPATIENT
Start: 2020-07-03 | End: 2020-07-04

## 2020-07-03 RX ORDER — LORAZEPAM 2 MG/ML
1 INJECTION INTRAMUSCULAR ONCE
Status: COMPLETED | OUTPATIENT
Start: 2020-07-03 | End: 2020-07-03

## 2020-07-03 RX ORDER — LIDOCAINE 50 MG/G
1 PATCH TOPICAL DAILY
Status: DISCONTINUED | OUTPATIENT
Start: 2020-07-04 | End: 2020-07-09 | Stop reason: HOSPADM

## 2020-07-03 RX ORDER — FERROUS SULFATE 325(65) MG
325 TABLET ORAL
Status: DISCONTINUED | OUTPATIENT
Start: 2020-07-04 | End: 2020-07-09 | Stop reason: HOSPADM

## 2020-07-03 RX ORDER — HYDROMORPHONE HCL/PF 1 MG/ML
1 SYRINGE (ML) INJECTION ONCE
Status: COMPLETED | OUTPATIENT
Start: 2020-07-03 | End: 2020-07-03

## 2020-07-03 RX ORDER — OXYCODONE HYDROCHLORIDE 5 MG/1
2.5 TABLET ORAL EVERY 4 HOURS PRN
Status: DISCONTINUED | OUTPATIENT
Start: 2020-07-03 | End: 2020-07-04

## 2020-07-03 RX ORDER — FLUVOXAMINE MALEATE 50 MG/1
150 TABLET, COATED ORAL
Status: DISCONTINUED | OUTPATIENT
Start: 2020-07-03 | End: 2020-07-09 | Stop reason: HOSPADM

## 2020-07-03 RX ORDER — ACETAMINOPHEN 325 MG/1
975 TABLET ORAL EVERY 8 HOURS SCHEDULED
Status: DISCONTINUED | OUTPATIENT
Start: 2020-07-03 | End: 2020-07-09 | Stop reason: HOSPADM

## 2020-07-03 RX ORDER — CEFAZOLIN SODIUM 2 G/50ML
2000 SOLUTION INTRAVENOUS ONCE
Status: CANCELLED | OUTPATIENT
Start: 2020-07-04

## 2020-07-03 RX ORDER — MECLIZINE HCL 12.5 MG/1
12.5 TABLET ORAL EVERY 8 HOURS PRN
Status: DISCONTINUED | OUTPATIENT
Start: 2020-07-03 | End: 2020-07-09 | Stop reason: HOSPADM

## 2020-07-03 RX ORDER — LAMOTRIGINE 100 MG/1
200 TABLET ORAL DAILY
Status: DISCONTINUED | OUTPATIENT
Start: 2020-07-04 | End: 2020-07-09 | Stop reason: HOSPADM

## 2020-07-03 RX ORDER — GABAPENTIN 300 MG/1
300 CAPSULE ORAL 3 TIMES DAILY
Status: DISCONTINUED | OUTPATIENT
Start: 2020-07-03 | End: 2020-07-09 | Stop reason: HOSPADM

## 2020-07-03 RX ORDER — FENTANYL CITRATE 50 UG/ML
50 INJECTION, SOLUTION INTRAMUSCULAR; INTRAVENOUS ONCE
Status: COMPLETED | OUTPATIENT
Start: 2020-07-03 | End: 2020-07-03

## 2020-07-03 RX ORDER — DOCUSATE SODIUM 100 MG/1
100 CAPSULE, LIQUID FILLED ORAL 2 TIMES DAILY
Status: DISCONTINUED | OUTPATIENT
Start: 2020-07-03 | End: 2020-07-09 | Stop reason: HOSPADM

## 2020-07-03 RX ORDER — POTASSIUM CHLORIDE 14.9 MG/ML
20 INJECTION INTRAVENOUS ONCE
Status: COMPLETED | OUTPATIENT
Start: 2020-07-03 | End: 2020-07-03

## 2020-07-03 RX ORDER — HYDROMORPHONE HCL/PF 1 MG/ML
0.2 SYRINGE (ML) INJECTION EVERY 4 HOURS PRN
Status: DISCONTINUED | OUTPATIENT
Start: 2020-07-03 | End: 2020-07-05

## 2020-07-03 RX ORDER — ONDANSETRON 2 MG/ML
4 INJECTION INTRAMUSCULAR; INTRAVENOUS EVERY 6 HOURS PRN
Status: DISCONTINUED | OUTPATIENT
Start: 2020-07-03 | End: 2020-07-08

## 2020-07-03 RX ORDER — FENTANYL CITRATE 50 UG/ML
1 INJECTION, SOLUTION INTRAMUSCULAR; INTRAVENOUS ONCE
Status: COMPLETED | OUTPATIENT
Start: 2020-07-03 | End: 2020-07-03

## 2020-07-03 RX ORDER — ONDANSETRON 2 MG/ML
1 INJECTION INTRAMUSCULAR; INTRAVENOUS ONCE
Status: COMPLETED | OUTPATIENT
Start: 2020-07-03 | End: 2020-07-03

## 2020-07-03 RX ADMIN — FENTANYL CITRATE 50 MCG: 50 INJECTION, SOLUTION INTRAMUSCULAR; INTRAVENOUS at 17:46

## 2020-07-03 RX ADMIN — LORAZEPAM 1 MG: 2 INJECTION INTRAMUSCULAR; INTRAVENOUS at 19:27

## 2020-07-03 RX ADMIN — SODIUM CHLORIDE 1000 ML: 0.9 INJECTION, SOLUTION INTRAVENOUS at 17:53

## 2020-07-03 RX ADMIN — ACETAMINOPHEN 975 MG: 325 TABLET, FILM COATED ORAL at 23:57

## 2020-07-03 RX ADMIN — IOHEXOL 100 ML: 350 INJECTION, SOLUTION INTRAVENOUS at 17:12

## 2020-07-03 RX ADMIN — POTASSIUM CHLORIDE 20 MEQ: 14.9 INJECTION, SOLUTION INTRAVENOUS at 17:45

## 2020-07-03 RX ADMIN — HYDROMORPHONE HYDROCHLORIDE 1 MG: 1 INJECTION, SOLUTION INTRAMUSCULAR; INTRAVENOUS; SUBCUTANEOUS at 22:32

## 2020-07-03 RX ADMIN — GABAPENTIN 300 MG: 300 CAPSULE ORAL at 23:58

## 2020-07-03 NOTE — EMTALA/ACUTE CARE TRANSFER
[facility]  [address] [address] [address]  Dept: [phone]      EMTALA TRANSFER CONSENT    NAME Dannielle Cha                                         1960                              MRN [id number]    I have been informed of my rights regarding examination, treatment, and transfer   by Dr Ok Kowalski DO    Benefits: Specialized equipment and/or services available at the receiving facility (Include comment)________________________(Trauma)    Risks: Potential for delay in receiving treatment, Potential deterioration of medical condition, Loss of IV, Increased discomfort during transfer, Possible worsening of condition or death during transfer      Consent for Transfer:  I acknowledge that my medical condition has been evaluated and explained to me by the emergency department physician or other qualified medical person and/or my attending physician, who has recommended that I be transferred to the service of  Accepting Physician: Dr Michelle Salamanca at [address], [address] : [place]  The above potential benefits of such transfer, the potential risks associated with such transfer, and the probable risks of not being transferred have been explained to me, and I fully understand them  The doctor has explained that, in my case, the benefits of transfer outweigh the risks  I agree to be transferred  I authorize the performance of emergency medical procedures and treatments upon me in both transit and upon arrival at the receiving facility  Additionally, I authorize the release of any and all medical records to the receiving facility and request they be transported with me, if possible  I understand that the safest mode of transportation during a medical emergency is an ambulance and that the Hospital advocates the use of this mode of transport   Risks of traveling to the receiving facility by car, including absence of medical control, life sustaining equipment, such as oxygen, and medical personnel has been explained to me and I fully understand them  (BLOSSOM CORRECT BOX BELOW)  [ [place]  I consent to the stated transfer and to be transported by ambulance/helicopter  [  ]  I consent to the stated transfer, but refuse transportation by ambulance and accept full responsibility for my transportation by car  I understand the risks of non-ambulance transfers and I exonerate the Hospital and its staff from any deterioration in my condition that results from this refusal     X___________________________________________    DATE  20  TIME________  Signature of patient or legally responsible individual signing on patient behalf           RELATIONSHIP TO PATIENT_________________________          Provider Certification    NAME Sherif Caro                                         1960                              MRN [id number]    A medical screening exam was performed on the above named patient  Based on the examination:    Condition Necessitating Transfer The primary encounter diagnosis was CHI (closed head injury)  Diagnoses of Concussion, Cervical strain, acute, Multiple rib fractures, Pulmonary contusion, Left forearm fracture, closed, initial encounter, Multiple contusions, and T9 vertebral fracture ([facility] [address] ) were also pertinent to this visit      Patient Condition: The patient has been stabilized such that within reasonable medical probability, no material deterioration of the patient condition or the condition of the unborn child(michael) is likely to result from the transfer(Trauma)    Reason for Transfer: Level of Care needed not available at this facility(Trauma)    Transfer Requirements: Onur Liu [facility]   · Space available and qualified personnel available for treatment as acknowledged by Charisse Sinha, [address]  · Agreed to accept transfer and to provide appropriate medical treatment as acknowledged by         Jocelyne Amen  · Appropriate medical records of the examination and treatment of the patient are provided at the time of transfer   [address] _______  · Transfer will be performed by qualified personnel from [facility]  and appropriate transfer equipment as required, including the use of necessary and appropriate life support measures  Provider Certification: I have examined the patient and explained the following risks and benefits of being transferred/refusing transfer to the patient/family:  General risk, such as traffic hazards, adverse weather conditions, rough terrain or turbulence, possible failure of equipment (including vehicle or aircraft), or consequences of actions of persons outside the control of the transport personnel, Unanticipated needs of medical equipment and personnel during transport, Risk of worsening condition, The possibility of a transport vehicle being unavailable      Based on these reasonable risks and benefits to the patient and/or the unborn child(michael), and based upon the information available at the time of the patients examination, I certify that the medical benefits reasonably to be expected from the provision of appropriate medical treatments at another medical facility outweigh the increasing risks, if any, to the individuals medical condition, and in the case of labor to the unborn child, from effecting the transfer      X____________________________________________ DATE 07/03/20        TIME_______      ORIGINAL - SEND TO MEDICAL RECORDS   COPY - SEND WITH PATIENT DURING TRANSFER

## 2020-07-03 NOTE — ED PROVIDER NOTES
History  Chief Complaint   Patient presents with   Vernona Merlin     pt reports falling down stairs, per ems hjusband found pt on the floor unable to get up  deformity noted to L forearm  pt recently addmitted for falls  + thinners     55y F recently discharged after being admitted for syncope and hypokalemia presents via EMS after a fall  Became dizzy and fell down 15 carpeted steps "head over heels"  +head injury, denies LOC  C/o diffuse neck and back pain, chest pain, no sob/fox, some diffuse abd pain, no LE, +left arm pain w/ obvious deformity that was splinted at the scene  Denies any numbness or weakness  Reports continuing to feel weak and dizzy at times at home since her discharge  Tetanus UTD  History provided by:  Patient and EMS personnel   used: No    Fall   Mechanism of injury: fall    Injury location:  Head/neck, shoulder/arm and torso  Head/neck injury location:  Head, L neck and R neck  Shoulder/arm injury location:  L forearm  Torso injury location:  Back  Incident location:  Home  Arrived directly from scene: yes    Fall:     Fall occurred:  Down stairs    Height of fall:  15 carpeted steps    Impact surface:  Stairs    Point of impact:  Head, outstretched arms and back    Entrapped after fall: no    Protective equipment: none    Suspicion of alcohol use: no    Suspicion of drug use: no    Prior to arrival data:     Responsiveness at scene:  Alert    Orientation at scene:  Person, place, situation and time    Loss of consciousness: no      Amnesic to event: no      Medications administered:  None    Immobilization:  LUE splint  Associated symptoms: back pain, chest pain, headaches and neck pain    Associated symptoms: no difficulty breathing, no loss of consciousness and no nausea    Risk factors: anticoagulation therapy        Prior to Admission Medications   Prescriptions Last Dose Informant Patient Reported? Taking?    ARIPiprazole (ABILIFY) 15 mg tablet   No No   Sig: Take 0 5 tablets (7 5 mg total) by mouth daily   Patient taking differently: Take 15 mg by mouth daily    Erenumab-aooe (AIMOVIG) 140 MG/ML SOAJ   Yes No   Sig: Inject 140 mg under the skin every 30 (thirty) days    LORazepam (ATIVAN) 1 mg tablet   Yes No   Sig: Take 2 mg by mouth 2 (two) times a day    LORazepam (ATIVAN) 2 mg tablet   Yes No   Sig: Take 4 mg by mouth daily at bedtime   acetaminophen (TYLENOL) 500 mg tablet   No No   Sig: Take 1 tablet (500 mg total) by mouth every 6 (six) hours as needed (pain)   apixaban (ELIQUIS) 5 mg   Yes No   Sig: Take 5 mg by mouth 2 (two) times a day   cholecalciferol (VITAMIN D3) 1,000 units tablet   Yes No   Sig: Take 5,000 Units by mouth daily   ferrous sulfate 325 (65 Fe) mg tablet   Yes No   Sig: Take 325 mg by mouth daily with breakfast   fluvoxaMINE (LUVOX) 50 mg tablet   No No   Sig: Take 3 tablets (150 mg total) by mouth daily at bedtime   Patient taking differently: Take 300 mg by mouth daily at bedtime    gabapentin (NEURONTIN) 600 MG tablet   Yes No   Sig: Take 600 mg by mouth 3 (three) times a day  lamoTRIgine (LaMICtal) 200 MG tablet   Yes No   Sig: Take 200 mg by mouth daily     meclizine (ANTIVERT) 12 5 MG tablet   No No   Sig: Take 1 tablet (12 5 mg total) by mouth every 8 (eight) hours as needed for dizziness for up to 7 days   mexiletine (MEXITIL) 150 mg capsule   Yes No   Sig: Take 150 mg by mouth 3 (three) times a day   ondansetron (ZOFRAN) 4 mg tablet   No No   Sig: Take 1 tablet (4 mg total) by mouth every 8 (eight) hours as needed for nausea or vomiting   zonisamide (ZONEGRAN) 100 mg capsule   Yes No   Sig: Take 200 mg by mouth daily      Facility-Administered Medications: None       Past Medical History:   Diagnosis Date    [facility]     Bipolar disorder     C  difficile diarrhea     Cervical radiculopathy     Chronic back pain     DVT, lower extremity ([facility] ) 1991    Fibromyalgia     History of TIAs     cannot remember details    Hypertension     Hypokalemia     Migraine     MRSA (methicillin resistant Staphylococcus aureus) 2012    nasal swab negative 19    Psychiatric disorder     Anxiety, major depression, bipolar    Spinal stenosis     Syncope     orthostatic hypotension       Past Surgical History:   Procedure Laterality Date    APPENDECTOMY      GASTRIC RESTRICTION SURGERY      Gastric Sleeve 2015    HYSTERECTOMY      JOINT REPLACEMENT      Left Knee  and Right Hip        Family History   Problem Relation Age of Onset    Breast cancer Mother     Migraines Mother     Arthritis Mother     Kidney disease Father     Heart disease Father     Diabetes Father     Arthritis Father     Brain cancer Brother     Seizures Brother      I have reviewed and agree with the history as documented  E-Cigarette/Vaping    E-Cigarette Use Never User      E-Cigarette/Vaping Substances     Social History     Tobacco Use    Smoking status: Former Smoker     Packs/day: 0 20     Types: Cigarettes     Last attempt to quit:      Years since quittin 5    Smokeless tobacco: Never Used   Substance Use Topics    Alcohol use: Yes     Frequency: Monthly or less     Binge frequency: Never     Comment: occasionally    Drug use: Never       Review of Systems   Cardiovascular: Positive for chest pain  Gastrointestinal: Negative for nausea  Musculoskeletal: Positive for back pain and neck pain  Neurological: Positive for headaches  Negative for loss of consciousness  All other systems reviewed and are negative  Physical Exam  Physical Exam   Constitutional: She is oriented to person, place, and time  She appears well-developed and well-nourished  HENT:   Head: Normocephalic  Head is with contusion  Right Ear: Tympanic membrane normal  No hemotympanum  Left Ear: Tympanic membrane normal  No hemotympanum     Nose: Nose normal    Mouth/Throat: Uvula is midline, oropharynx is clear and moist and mucous membranes are normal    Eyes: Conjunctivae and EOM are normal    Neck: Spinous process tenderness and muscular tenderness present  Midline tenderness, collared in ED   Cardiovascular: Normal rate and regular rhythm  Pulmonary/Chest: Effort normal and breath sounds normal  She exhibits tenderness  Abdominal: Soft  There is tenderness (diffuse)  Musculoskeletal: She exhibits deformity  Right hip: She exhibits normal range of motion and no tenderness  Left hip: She exhibits normal range of motion and no tenderness  Cervical back: She exhibits bony tenderness  Thoracic back: She exhibits bony tenderness  Lumbar back: She exhibits bony tenderness  Right forearm: She exhibits tenderness and swelling  She exhibits no bony tenderness  Left forearm: She exhibits tenderness, bony tenderness and swelling  Arms:       Legs:  Neurological: She is alert and oriented to person, place, and time  Skin: Skin is warm  Multiple scattered ecchymosis  Small abrasion to RLE   Psychiatric: She has a normal mood and affect  Nursing note and vitals reviewed        Vital Signs  ED Triage Vitals   Temperature Pulse Respirations Blood Pressure SpO2   07/03/20 1555 07/03/20 1558 07/03/20 1558 07/03/20 1558 07/03/20 1558   99 4 °F (37 4 °C) 85 18 130/73 93 %      Temp Source Heart Rate Source Patient Position - Orthostatic VS BP Location FiO2 (%)   07/03/20 1555 07/03/20 1558 07/03/20 1558 07/03/20 1558 --   Oral Monitor Lying Right arm       Pain Score       07/03/20 1558       9           Vitals:    07/03/20 1558 07/03/20 1600 07/03/20 1855   BP: 130/73 130/73 143/77   Pulse: 85 84 91   Patient Position - Orthostatic VS: Lying  Lying         Visual Acuity      ED Medications  Medications   fentanyl citrate (PF) (FOR EMS ONLY) 100 mcg/2 mL injection 100 mcg (0 mcg Does not apply Given to EMS 7/3/20 1601)   ondansetron (FOR EMS ONLY) (ZOFRAN) 4 mg/2 mL injection 4 mg (0 mg Does not apply Given to EMS 7/3/20 1601)   fentanyl citrate (PF) 100 MCG/2ML 50 mcg (50 mcg Intravenous Given 7/3/20 1746)   iohexol (OMNIPAQUE) 350 MG/ML injection (MULTI-DOSE) 100 mL (100 mL Intravenous Given 7/3/20 1712)   sodium chloride 0 9 % bolus 1,000 mL (1,000 mL Intravenous New Bag 7/3/20 1753)   potassium chloride 20 mEq IVPB (premix) (0 mEq Intravenous Stopped 7/3/20 1933)   LORazepam (ATIVAN) injection 1 mg (1 mg Intravenous Given 7/3/20 1927)       Diagnostic Studies  Results Reviewed     Procedure Component Value Units Date/Time    [facility] [[id number]]  (Normal) Collected:  07/03/20 1845    Lab Status:  Final result Specimen:  Nares from Nose Updated:  07/03/20 1947     SARS-CoV-2 Negative    Narrative: The specimen collection materials, transport medium, and/or testing methodology utilized in the production of these test results have been proven to be reliable in a limited validation with an abbreviated program under the Emergency Utilization Authorization provided by the FDA  Testing reported as "Presumptive positive" will be confirmed with secondary testing with a reference laboratory to ensure result accuracy  Clinical caution and judgement should be used with the interpretation of these results with consideration of the clinical impression and other laboratory testing  Testing reported as "Positive" or "Negative" has been proven to be accurate according to standard laboratory validation requirements  All testing is performed with control materials showing appropriate reactivity at standard intervals        Basic metabolic panel [[id number]]  (Abnormal) Collected:  07/03/20 1653    Lab Status:  Final result Specimen:  Blood from Arm, Right Updated:  07/03/20 1719     Sodium 141 mmol/L      Potassium 2 5 mmol/L      Chloride 102 mmol/L      CO2 33 mmol/L      ANION GAP 6 mmol/L      BUN 24 mg/dL      Creatinine 1 29 mg/dL Glucose 100 mg/dL      Calcium 8 1 mg/dL      eGFR 45 ml/min/1 73sq m     Narrative:       National Kidney Disease Foundation guidelines for Chronic Kidney Disease (CKD):     Stage 1 with normal or high GFR (GFR > 90 mL/min/1 73 square meters)    Stage 2 Mild CKD (GFR = 60-89 mL/min/1 73 square meters)    Stage 3A Moderate CKD (GFR = 45-59 mL/min/1 73 square meters)    Stage 3B Moderate CKD (GFR = 30-44 mL/min/1 73 square meters)    Stage 4 Severe CKD (GFR = 15-29 mL/min/1 73 square meters)    Stage 5 End Stage CKD (GFR <15 mL/min/1 73 square meters)  Note: GFR calculation is accurate only with a steady state creatinine    CBC and differential [[id number]]  (Abnormal) Collected:  07/03/20 1653    Lab Status:  Final result Specimen:  Blood from Arm, Right Updated:  07/03/20 1706     WBC 13 51 Thousand/uL      RBC 3 68 Million/uL      Hemoglobin 11 8 g/dL      Hematocrit 35 9 %      MCV 98 fL      MCH 32 1 pg      MCHC 32 9 g/dL      RDW 14 0 %      MPV 10 4 fL      Platelets 531 Thousands/uL      nRBC 0 /100 WBCs      Neutrophils Relative 79 %      Immat GRANS % 1 %      Lymphocytes Relative 10 %      Monocytes Relative 10 %      Eosinophils Relative 0 %      Basophils Relative 0 %      Neutrophils Absolute 10 67 Thousands/µL      Immature Grans Absolute 0 15 Thousand/uL      Lymphocytes Absolute 1 34 Thousands/µL      Monocytes Absolute 1 32 Thousand/µL      Eosinophils Absolute 0 01 Thousand/µL      Basophils Absolute 0 02 Thousands/µL                  TRAUMA - CT head wo contrast   ED Interpretation by Ok Kowalski DO (07/03 1742)   See below      Final Result by Leanne Marcus MD (07/03 1724)      1  No acute intracranial abnormality  2   Midline high parietal scalp hematoma without a calvarial fracture        Workstation performed: [id number]         TRAUMA - CT spine cervical wo contrast   ED Interpretation by Ok Kowalski DO (07/03 1742)   See below      Final Result by Leanne Marcus MD (07/03 1730)      No cervical spine fracture or traumatic malalignment  Workstation performed: [id number]         TRAUMA - CT chest abdomen pelvis w contrast   ED Interpretation by Dayan Baptiste DO (07/03 1807)   See below      Final Result by Misbah Connors MD (07/03 1802)      1  Acute minimally displaced right anterior 4th, 5th, and 6th rib fractures  No pneumothorax  2   Acute minimally displaced right lateral osteophyte fracture of the T9 vertebral body  3   Peripheral groundglass opacities in the bilateral upper lobes, nonspecific, possibly pulmonary contusions and/or infectious/inflammatory  4   No acute traumatic injury in the abdomen or pelvis  No acute osseous abnormality in the lumbar spine  5   Incidentally imaged acute displaced and angulated left radial and ulnar distal shaft fractures  The study was marked in [employer] for immediate notification  Workstation performed: [id number]         CT recon only thoracic spine (No Charge)   Final Result by Misbah Connors MD (57/33 1802)      1  Acute minimally displaced right anterior 4th, 5th, and 6th rib fractures  No pneumothorax  2   Acute minimally displaced right lateral osteophyte fracture of the T9 vertebral body  3   Peripheral groundglass opacities in the bilateral upper lobes, nonspecific, possibly pulmonary contusions and/or infectious/inflammatory  4   No acute traumatic injury in the abdomen or pelvis  No acute osseous abnormality in the lumbar spine  5   Incidentally imaged acute displaced and angulated left radial and ulnar distal shaft fractures  The study was marked in [employer] for immediate notification  Workstation performed: [id number]         CT recon only lumbar spine (No Charge)   Final Result by Misbah Connors MD (67/30 1802)      1  Acute minimally displaced right anterior 4th, 5th, and 6th rib fractures  No pneumothorax        2   Acute minimally displaced right lateral osteophyte fracture of the T9 vertebral body  3   Peripheral groundglass opacities in the bilateral upper lobes, nonspecific, possibly pulmonary contusions and/or infectious/inflammatory  4   No acute traumatic injury in the abdomen or pelvis  No acute osseous abnormality in the lumbar spine  5   Incidentally imaged acute displaced and angulated left radial and ulnar distal shaft fractures  The study was marked in [facility] for immediate notification        Workstation performed: [id number]         XR wrist 3+ views LEFT   ED Interpretation by Roosevelt Elkins DO (07/03 1749)   Abnormal   Displaced, rad/ulnar fracture w/ over-riding ends      XR forearm 2 views LEFT   ED Interpretation by Roosevelt Elkins DO (07/03 1749)   Abnormal   Displaced, rad/ulnar fracture w/ over-riding ends                 Procedures  ECG 12 Lead Documentation Only  Date/Time: 7/3/2020 5:39 PM  Performed by: Roosevelt Elkins DO  Authorized by: Roosevelt Elkins DO     ECG reviewed by me, the ED Provider: yes    Patient location:  ED  Previous ECG:     Previous ECG:  Compared to current    Similarity:  No change  Interpretation:     Interpretation: non-specific    Rate:     ECG rate:  84    ECG rate assessment: normal    Rhythm:     Rhythm: sinus rhythm    QRS:     QRS axis:  Left  ST segments:     ST segments:  Non-specific  T waves:     T waves: non-specific      CriticalCare Time  Performed by: Roosevelt Elkins DO  Authorized by: Roosevelt Elkins DO     Critical care provider statement:     Critical care time (minutes):  35    Critical care time was exclusive of:  Separately billable procedures and treating other patients and teaching time    Critical care was necessary to treat or prevent imminent or life-threatening deterioration of the following conditions:  Trauma    Critical care was time spent personally by me on the following activities:  Blood draw for specimens, obtaining history from patient or surrogate, development of treatment plan with patient or surrogate, discussions with consultants, evaluation of patient's response to treatment, examination of patient, review of old charts, re-evaluation of patient's condition, ordering and review of radiographic studies, ordering and review of laboratory studies and ordering and performing treatments and interventions    I assumed direction of critical care for this patient from another provider in my specialty: no               ED Course  ED Course as of Jul 03 2204   Fri Jul 03, 2020   1807  Acute minimally displaced right anterior 4th, 5th, and 6th rib fractures  No pneumothorax      2  Acute minimally displaced right lateral osteophyte fracture of the T9 vertebral body      3   Peripheral groundglass opacities in the bilateral upper lobes, nonspecific, possibly pulmonary contusions and/or infectious/inflammatory      4  No acute traumatic injury in the abdomen or pelvis  No acute osseous abnormality in the lumbar spine  Will contact Trauma for transfer      [address] to arrange Trauma Transfer      [facility] Transport here and potassium still infusing  Told they could hold the potassium and either take bag to finish once at [facility] or discard and they could continue to replace it once at [facility]  US AUDIT      Most Recent Value   Initial Alcohol Screen: US AUDIT-C    1  How often do you have a drink containing alcohol? 1 Filed at: 07/03/2020 1605   2  How many drinks containing alcohol do you have on a typical day you are drinking? 0 Filed at: 07/03/2020 1605   3b  FEMALE Any Age, or MALE 65+: How often do you have 4 or more drinks on one occassion?   0 Filed at: 07/03/2020 1605   Audit-C Score  1 Filed at: 07/03/2020 1605                                            MDM  Number of Diagnoses or Management Options  Cervical strain, acute: new and requires workup  CHI (closed head injury): new and requires workup  Concussion: new and requires workup  Left forearm fracture, closed, initial encounter: new and requires workup  Multiple contusions: new and requires workup  Multiple rib fractures: new and requires workup  Pulmonary contusion: new and requires workup  T9 vertebral fracture [facility]): new and requires workup  Diagnosis management comments: 59y F on [address] [place] [address] Road, w/ fall down 15 steps  Mult bruises, abrasions, obviously deformed let arm, ttp to chest wall and abd, ttp to neck and back  High risk for complications of traumatic injuries d/t [address] [place] [address] Road  C-collar placed upon initial evaluation  Will get cth, c-spine, c/a/p w/ iv contrast w/ t/l recons, xray left wrist/forearm and continue to eval       Amount and/or Complexity of Data Reviewed  Clinical lab tests: reviewed and ordered  Tests in the radiology section of CPT®: reviewed and ordered  Tests in the medicine section of CPT®: ordered and reviewed  Decide to obtain previous medical records or to obtain history from someone other than the patient: yes  Obtain history from someone other than the patient: yes  Independent visualization of images, tracings, or specimens: yes          Disposition  Final diagnoses:   CHI (closed head injury)   Concussion   Cervical strain, acute   Multiple rib fractures   Pulmonary contusion   Left forearm fracture, closed, initial encounter   Multiple contusions   T9 vertebral fracture ([facility] )     Time reflects when diagnosis was documented in both MDM as applicable and the Disposition within this note     Time User Action Codes Description Comment    7/3/2020  6:31 PM Estrella MONTES Add [S09 90XA] [address] (closed head injury)     7/3/2020  6:32 PM Naomi Shepherd Add [S06 0X9A] Concussion     7/3/2020  6:32 PM Joao, [address] [place] [address] [S16  1XXA] Cervical strain, acute     7/3/2020  6:32 PM Naomi Shepherd Add [S22 49XA] Multiple rib fractures     7/3/2020  6:32 PM Estrella MONTES Add [P84 349Y] Pulmonary contusion     7/3/2020  6:32 PM Naomi Shepherd Add [S52  92XA] Left forearm fracture, closed, initial encounter     7/3/2020  6:32 PM JoaoNaomi SIMON Add [T07  XXXA] Multiple contusions     7/3/2020  6:33 PM Enedina MONTES Add [Q21 981L] T9 vertebral fracture [facility])       ED Disposition     ED Disposition Condition Date/Time Comment    Transfer to Another Facility-In Network  Fri Jul 3, 2020  6:31 PM Ian Reed should be transferred out to [facility], Dr Deangelo Burnham accepted at [phone]          MD Documentation      Most Recent Value   Patient Condition  The patient has been stabilized such that within reasonable medical probability, no material deterioration of the patient condition or the condition of the unborn child(michael) is likely to result from the transfer [Trauma]   Reason for Transfer  Level of Care needed not available at this facility [Trauma]   Benefits of Transfer  Specialized equipment and/or services available at the receiving facility (Include comment)________________________ [Trauma]   Risks of Transfer  Potential for delay in receiving treatment, Potential deterioration of medical condition, Loss of IV, Increased discomfort during transfer, Possible worsening of condition or death during transfer   Accepting Physician  Dr Nunu Avilez Name, 207 [address]verse[address]    (Name & Tel number)  [facility], [address] [facility]   Transported by (Company and Unit #)  Nia Rush   Provider Certification  General risk, such as traffic hazards, adverse weather conditions, rough terrain or turbulence, possible failure of equipment (including vehicle or aircraft), or consequences of actions of persons outside the control of the transport personnel, Unanticipated needs of medical equipment and personnel during transport, Risk of worsening condition, The possibility of a transport vehicle being unavailable      RN Documentation      Most [facility] Name, 207 AdverseEvents    (Name & Tel number)  Mone Santos, [address] [facility]   Transported by Eneida Cheney and Unit #)  REBECCA      Follow-up Information    None         Discharge Medication List as of 7/3/2020  7:37 PM      CONTINUE these medications which have NOT CHANGED    Details   acetaminophen (TYLENOL) 500 mg tablet Take 1 tablet (500 mg total) by mouth every 6 (six) hours as needed (pain), Starting Tue 7/30/2019, Print      apixaban (ELIQUIS) 5 mg Take 5 mg by mouth 2 (two) times a day, Historical Med      ARIPiprazole (ABILIFY) 15 mg tablet Take 0 5 tablets (7 5 mg total) by mouth daily, Starting Mon 10/21/2019, Print      cholecalciferol (VITAMIN D3) 1,000 units tablet Take 5,000 Units by mouth daily, Historical Med      Erenumab-aooe (AIMOVIG) 140 MG/ML SOAJ Inject 140 mg under the skin every 30 (thirty) days , Starting Fri 3/22/2019, Historical Med      ferrous sulfate 325 (65 Fe) mg tablet Take 325 mg by mouth daily with breakfast, Historical Med      fluvoxaMINE (LUVOX) 50 mg tablet Take 3 tablets (150 mg total) by mouth daily at bedtime, Starting Sun 10/20/2019, Print      gabapentin (NEURONTIN) 600 MG tablet Take 600 mg by mouth 3 (three) times a day , Until Discontinued, Historical Med      lamoTRIgine (LaMICtal) 200 MG tablet Take 200 mg by mouth daily  , Until Discontinued, Historical Med      !! LORazepam (ATIVAN) 1 mg tablet Take 2 mg by mouth 2 (two) times a day , Historical Med      !!  LORazepam (ATIVAN) 2 mg tablet Take 4 mg by mouth daily at bedtime, Historical Med      meclizine (ANTIVERT) 12 5 MG tablet Take 1 tablet (12 5 mg total) by mouth every 8 (eight) hours as needed for dizziness for up to 7 days, Starting Wed 7/1/2020, Until Wed 7/8/2020, Normal      mexiletine (MEXITIL) 150 mg capsule Take 150 mg by mouth 3 (three) times a day, Historical Med      ondansetron (ZOFRAN) 4 mg tablet Take 1 tablet (4 mg total) by mouth every 8 (eight) hours as needed for nausea or vomiting, Starting Mon 10/7/2019, Normal      zonisamide (ZONEGRAN) 100 mg capsule Take 200 mg by mouth daily, Historical Med       !! - Potential duplicate medications found  Please discuss with provider  No discharge procedures on file      PDMP Review       Value Time User    PDMP Reviewed  Yes 10/2/2019  4:27 PM Aaliyah Mcknight MD          ED Provider  Electronically Signed by           Shiva Chavez DO  07/03/20 [id number]

## 2020-07-04 ENCOUNTER — APPOINTMENT (INPATIENT)
Dept: RADIOLOGY | Facility: HOSPITAL | Age: 60
DRG: 511 | End: 2020-07-04
Payer: COMMERCIAL

## 2020-07-04 ENCOUNTER — ANESTHESIA EVENT (INPATIENT)
Dept: PERIOP | Facility: HOSPITAL | Age: 60
DRG: 511 | End: 2020-07-04
Payer: COMMERCIAL

## 2020-07-04 ENCOUNTER — ANESTHESIA (INPATIENT)
Dept: PERIOP | Facility: HOSPITAL | Age: 60
DRG: 511 | End: 2020-07-04
Payer: COMMERCIAL

## 2020-07-04 LAB
ABO GROUP BLD: NORMAL
ANION GAP SERPL CALCULATED.3IONS-SCNC: 5 MMOL/L (ref 4–13)
APTT PPP: 28 SECONDS (ref 23–37)
ATRIAL RATE: 84 BPM
BASE EXCESS BLDA CALC-SCNC: 7 MMOL/L (ref -2–3)
BLD GP AB SCN SERPL QL: NEGATIVE
BUN SERPL-MCNC: 17 MG/DL (ref 5–25)
CA-I BLD-SCNC: 1.17 MMOL/L (ref 1.12–1.32)
CALCIUM SERPL-MCNC: 7.8 MG/DL (ref 8.3–10.1)
CHLORIDE SERPL-SCNC: 104 MMOL/L (ref 100–108)
CO2 SERPL-SCNC: 33 MMOL/L (ref 21–32)
CREAT SERPL-MCNC: 0.82 MG/DL (ref 0.6–1.3)
ERYTHROCYTE [DISTWIDTH] IN BLOOD BY AUTOMATED COUNT: 14.4 % (ref 11.6–15.1)
GFR SERPL CREATININE-BSD FRML MDRD: 77 ML/MIN/1.73SQ M
GLUCOSE SERPL-MCNC: 86 MG/DL (ref 65–140)
GLUCOSE SERPL-MCNC: 89 MG/DL (ref 65–140)
HCO3 BLDA-SCNC: 34 MMOL/L (ref 24–30)
HCT VFR BLD AUTO: 32.9 % (ref 34.8–46.1)
HCT VFR BLD CALC: 34 % (ref 34.8–46.1)
HGB BLD-MCNC: 10.8 G/DL (ref 11.5–15.4)
HGB BLDA-MCNC: 11.6 G/DL (ref 11.5–15.4)
INR PPP: 1.15 (ref 0.84–1.19)
MCH RBC QN AUTO: 31.6 PG (ref 26.8–34.3)
MCHC RBC AUTO-ENTMCNC: 32.8 G/DL (ref 31.4–37.4)
MCV RBC AUTO: 96 FL (ref 82–98)
P AXIS: 26 DEGREES
PCO2 BLD: 36 MMOL/L (ref 21–32)
PCO2 BLD: 57.2 MM HG (ref 42–50)
PH BLD: 7.38 [PH] (ref 7.3–7.4)
PLATELET # BLD AUTO: 211 THOUSANDS/UL (ref 149–390)
PMV BLD AUTO: 10.8 FL (ref 8.9–12.7)
PO2 BLD: 42 MM HG (ref 35–45)
POTASSIUM BLD-SCNC: 3.3 MMOL/L (ref 3.5–5.3)
POTASSIUM SERPL-SCNC: 2.5 MMOL/L (ref 3.5–5.3)
PR INTERVAL: 144 MS
PROTHROMBIN TIME: 14.7 SECONDS (ref 11.6–14.5)
QRS AXIS: -13 DEGREES
QRSD INTERVAL: 88 MS
QT INTERVAL: 340 MS
QTC INTERVAL: 401 MS
RBC # BLD AUTO: 3.42 MILLION/UL (ref 3.81–5.12)
RH BLD: POSITIVE
SAO2 % BLD FROM PO2: 76 % (ref 60–85)
SODIUM BLD-SCNC: 144 MMOL/L (ref 136–145)
SODIUM SERPL-SCNC: 142 MMOL/L (ref 136–145)
SPECIMEN EXPIRATION DATE: NORMAL
SPECIMEN SOURCE: ABNORMAL
T WAVE AXIS: -30 DEGREES
VENTRICULAR RATE: 84 BPM
WBC # BLD AUTO: 9.57 THOUSAND/UL (ref 4.31–10.16)

## 2020-07-04 PROCEDURE — 85730 THROMBOPLASTIN TIME PARTIAL: CPT | Performed by: SURGERY

## 2020-07-04 PROCEDURE — 86900 BLOOD TYPING SEROLOGIC ABO: CPT | Performed by: SURGERY

## 2020-07-04 PROCEDURE — 99233 SBSQ HOSP IP/OBS HIGH 50: CPT | Performed by: SURGERY

## 2020-07-04 PROCEDURE — NC001 PR NO CHARGE: Performed by: PHYSICIAN ASSISTANT

## 2020-07-04 PROCEDURE — 85610 PROTHROMBIN TIME: CPT | Performed by: SURGERY

## 2020-07-04 PROCEDURE — 80048 BASIC METABOLIC PNL TOTAL CA: CPT | Performed by: SURGERY

## 2020-07-04 PROCEDURE — 86901 BLOOD TYPING SEROLOGIC RH(D): CPT | Performed by: SURGERY

## 2020-07-04 PROCEDURE — 84132 ASSAY OF SERUM POTASSIUM: CPT

## 2020-07-04 PROCEDURE — C1713 ANCHOR/SCREW BN/BN,TIS/BN: HCPCS | Performed by: ORTHOPAEDIC SURGERY

## 2020-07-04 PROCEDURE — 73090 X-RAY EXAM OF FOREARM: CPT | Performed by: ORTHOPAEDIC SURGERY

## 2020-07-04 PROCEDURE — 71046 X-RAY EXAM CHEST 2 VIEWS: CPT

## 2020-07-04 PROCEDURE — 85027 COMPLETE CBC AUTOMATED: CPT | Performed by: EMERGENCY MEDICINE

## 2020-07-04 PROCEDURE — 84295 ASSAY OF SERUM SODIUM: CPT

## 2020-07-04 PROCEDURE — 0PSL04Z REPOSITION LEFT ULNA WITH INTERNAL FIXATION DEVICE, OPEN APPROACH: ICD-10-PCS | Performed by: SURGERY

## 2020-07-04 PROCEDURE — 82803 BLOOD GASES ANY COMBINATION: CPT

## 2020-07-04 PROCEDURE — 93010 ELECTROCARDIOGRAM REPORT: CPT | Performed by: INTERNAL MEDICINE

## 2020-07-04 PROCEDURE — 85014 HEMATOCRIT: CPT

## 2020-07-04 PROCEDURE — 86850 RBC ANTIBODY SCREEN: CPT | Performed by: SURGERY

## 2020-07-04 PROCEDURE — 82947 ASSAY GLUCOSE BLOOD QUANT: CPT

## 2020-07-04 PROCEDURE — 99255 IP/OBS CONSLTJ NEW/EST HI 80: CPT | Performed by: ORTHOPAEDIC SURGERY

## 2020-07-04 PROCEDURE — 82330 ASSAY OF CALCIUM: CPT

## 2020-07-04 PROCEDURE — 0PSJ04Z REPOSITION LEFT RADIUS WITH INTERNAL FIXATION DEVICE, OPEN APPROACH: ICD-10-PCS | Performed by: SURGERY

## 2020-07-04 PROCEDURE — 25575 OPTX RDL&ULN SHFT FX RDS&ULN: CPT | Performed by: ORTHOPAEDIC SURGERY

## 2020-07-04 DEVICE — 2.7MM CORTEX SCREW SLF-TPNG WITH T8 STARDRIVE RECESS 14MM: Type: IMPLANTABLE DEVICE | Site: ULNA | Status: FUNCTIONAL

## 2020-07-04 DEVICE — 2.7MM CORTEX SCREW SLF-TPNG WITH T8 STARDRIVE RECESS 16MM: Type: IMPLANTABLE DEVICE | Site: ULNA | Status: FUNCTIONAL

## 2020-07-04 DEVICE — 3.5MM LCP PLATE 7 HOLES 98MM
Type: IMPLANTABLE DEVICE | Site: RADIUS | Status: FUNCTIONAL
Brand: LCP

## 2020-07-04 DEVICE — 2.7MM LCP(TM) PLATE 7 HOLES/67MM
Type: IMPLANTABLE DEVICE | Site: ULNA | Status: FUNCTIONAL
Brand: LCP

## 2020-07-04 DEVICE — 3.5MM CORTEX SCREW SELF-TAPPING 16MM: Type: IMPLANTABLE DEVICE | Site: ULNA | Status: FUNCTIONAL

## 2020-07-04 DEVICE — 3.5MM CORTEX SCREW SELF-TAPPING 14MM: Type: IMPLANTABLE DEVICE | Site: RADIUS | Status: FUNCTIONAL

## 2020-07-04 RX ORDER — MAGNESIUM HYDROXIDE 1200 MG/15ML
LIQUID ORAL AS NEEDED
Status: DISCONTINUED | OUTPATIENT
Start: 2020-07-04 | End: 2020-07-04 | Stop reason: HOSPADM

## 2020-07-04 RX ORDER — SODIUM CHLORIDE, SODIUM LACTATE, POTASSIUM CHLORIDE, CALCIUM CHLORIDE 600; 310; 30; 20 MG/100ML; MG/100ML; MG/100ML; MG/100ML
INJECTION, SOLUTION INTRAVENOUS CONTINUOUS PRN
Status: DISCONTINUED | OUTPATIENT
Start: 2020-07-04 | End: 2020-07-04 | Stop reason: SURG

## 2020-07-04 RX ORDER — EPHEDRINE SULFATE 50 MG/ML
INJECTION INTRAVENOUS AS NEEDED
Status: DISCONTINUED | OUTPATIENT
Start: 2020-07-04 | End: 2020-07-04 | Stop reason: SURG

## 2020-07-04 RX ORDER — CEFAZOLIN SODIUM 1 G/3ML
INJECTION, POWDER, FOR SOLUTION INTRAMUSCULAR; INTRAVENOUS AS NEEDED
Status: DISCONTINUED | OUTPATIENT
Start: 2020-07-04 | End: 2020-07-04 | Stop reason: SURG

## 2020-07-04 RX ORDER — ONDANSETRON 2 MG/ML
INJECTION INTRAMUSCULAR; INTRAVENOUS AS NEEDED
Status: DISCONTINUED | OUTPATIENT
Start: 2020-07-04 | End: 2020-07-04 | Stop reason: SURG

## 2020-07-04 RX ORDER — HYDROMORPHONE HYDROCHLORIDE 2 MG/1
1 TABLET ORAL EVERY 6 HOURS PRN
Status: DISCONTINUED | OUTPATIENT
Start: 2020-07-04 | End: 2020-07-05

## 2020-07-04 RX ORDER — POTASSIUM CHLORIDE 20 MEQ/1
40 TABLET, EXTENDED RELEASE ORAL ONCE
Status: COMPLETED | OUTPATIENT
Start: 2020-07-04 | End: 2020-07-04

## 2020-07-04 RX ORDER — POTASSIUM CHLORIDE 20MEQ/15ML
40 LIQUID (ML) ORAL
Status: DISCONTINUED | OUTPATIENT
Start: 2020-07-04 | End: 2020-07-04

## 2020-07-04 RX ORDER — HYDROMORPHONE HCL/PF 1 MG/ML
0.5 SYRINGE (ML) INJECTION
Status: DISCONTINUED | OUTPATIENT
Start: 2020-07-04 | End: 2020-07-04 | Stop reason: HOSPADM

## 2020-07-04 RX ORDER — ONDANSETRON 2 MG/ML
4 INJECTION INTRAMUSCULAR; INTRAVENOUS ONCE AS NEEDED
Status: DISCONTINUED | OUTPATIENT
Start: 2020-07-04 | End: 2020-07-04 | Stop reason: HOSPADM

## 2020-07-04 RX ORDER — ALBUTEROL SULFATE 2.5 MG/3ML
2.5 SOLUTION RESPIRATORY (INHALATION) ONCE AS NEEDED
Status: DISCONTINUED | OUTPATIENT
Start: 2020-07-04 | End: 2020-07-04 | Stop reason: HOSPADM

## 2020-07-04 RX ORDER — POTASSIUM CHLORIDE 14.9 MG/ML
20 INJECTION INTRAVENOUS ONCE
Status: COMPLETED | OUTPATIENT
Start: 2020-07-04 | End: 2020-07-04

## 2020-07-04 RX ORDER — LABETALOL 20 MG/4 ML (5 MG/ML) INTRAVENOUS SYRINGE
10
Status: DISCONTINUED | OUTPATIENT
Start: 2020-07-04 | End: 2020-07-04 | Stop reason: HOSPADM

## 2020-07-04 RX ORDER — PROMETHAZINE HYDROCHLORIDE 25 MG/ML
12.5 INJECTION, SOLUTION INTRAMUSCULAR; INTRAVENOUS ONCE AS NEEDED
Status: DISCONTINUED | OUTPATIENT
Start: 2020-07-04 | End: 2020-07-04 | Stop reason: HOSPADM

## 2020-07-04 RX ORDER — ALBUMIN, HUMAN INJ 5% 5 %
SOLUTION INTRAVENOUS CONTINUOUS PRN
Status: DISCONTINUED | OUTPATIENT
Start: 2020-07-04 | End: 2020-07-04 | Stop reason: SURG

## 2020-07-04 RX ORDER — METOCLOPRAMIDE HYDROCHLORIDE 5 MG/ML
10 INJECTION INTRAMUSCULAR; INTRAVENOUS ONCE AS NEEDED
Status: DISCONTINUED | OUTPATIENT
Start: 2020-07-04 | End: 2020-07-04 | Stop reason: HOSPADM

## 2020-07-04 RX ORDER — POTASSIUM CHLORIDE 20MEQ/15ML
40 LIQUID (ML) ORAL ONCE
Status: COMPLETED | OUTPATIENT
Start: 2020-07-04 | End: 2020-07-04

## 2020-07-04 RX ORDER — LIDOCAINE HYDROCHLORIDE 10 MG/ML
INJECTION, SOLUTION EPIDURAL; INFILTRATION; INTRACAUDAL; PERINEURAL AS NEEDED
Status: DISCONTINUED | OUTPATIENT
Start: 2020-07-04 | End: 2020-07-04 | Stop reason: SURG

## 2020-07-04 RX ORDER — SODIUM CHLORIDE 9 MG/ML
INJECTION, SOLUTION INTRAVENOUS CONTINUOUS PRN
Status: DISCONTINUED | OUTPATIENT
Start: 2020-07-04 | End: 2020-07-04 | Stop reason: SURG

## 2020-07-04 RX ORDER — POTASSIUM CHLORIDE 20MEQ/15ML
40 LIQUID (ML) ORAL ONCE
Status: DISCONTINUED | OUTPATIENT
Start: 2020-07-04 | End: 2020-07-04

## 2020-07-04 RX ORDER — HYDROMORPHONE HCL/PF 1 MG/ML
0.2 SYRINGE (ML) INJECTION
Status: DISCONTINUED | OUTPATIENT
Start: 2020-07-04 | End: 2020-07-04 | Stop reason: HOSPADM

## 2020-07-04 RX ORDER — HYDRALAZINE HYDROCHLORIDE 20 MG/ML
5 INJECTION INTRAMUSCULAR; INTRAVENOUS
Status: DISCONTINUED | OUTPATIENT
Start: 2020-07-04 | End: 2020-07-04 | Stop reason: HOSPADM

## 2020-07-04 RX ORDER — SODIUM CHLORIDE, SODIUM LACTATE, POTASSIUM CHLORIDE, CALCIUM CHLORIDE 600; 310; 30; 20 MG/100ML; MG/100ML; MG/100ML; MG/100ML
125 INJECTION, SOLUTION INTRAVENOUS CONTINUOUS
Status: DISCONTINUED | OUTPATIENT
Start: 2020-07-04 | End: 2020-07-04

## 2020-07-04 RX ORDER — FENTANYL CITRATE/PF 50 MCG/ML
25 SYRINGE (ML) INJECTION
Status: DISCONTINUED | OUTPATIENT
Start: 2020-07-04 | End: 2020-07-04 | Stop reason: HOSPADM

## 2020-07-04 RX ORDER — PROPOFOL 10 MG/ML
INJECTION, EMULSION INTRAVENOUS AS NEEDED
Status: DISCONTINUED | OUTPATIENT
Start: 2020-07-04 | End: 2020-07-04 | Stop reason: SURG

## 2020-07-04 RX ORDER — FENTANYL CITRATE 50 UG/ML
INJECTION, SOLUTION INTRAMUSCULAR; INTRAVENOUS AS NEEDED
Status: DISCONTINUED | OUTPATIENT
Start: 2020-07-04 | End: 2020-07-04 | Stop reason: SURG

## 2020-07-04 RX ADMIN — GABAPENTIN 300 MG: 300 CAPSULE ORAL at 22:05

## 2020-07-04 RX ADMIN — EPHEDRINE SULFATE 10 MG: 50 INJECTION, SOLUTION INTRAVENOUS at 14:05

## 2020-07-04 RX ADMIN — HYDROCORTISONE SODIUM SUCCINATE 100 MG: 100 INJECTION, POWDER, FOR SOLUTION INTRAMUSCULAR; INTRAVENOUS at 13:23

## 2020-07-04 RX ADMIN — HYDROMORPHONE HYDROCHLORIDE 1 MG: 2 TABLET ORAL at 17:05

## 2020-07-04 RX ADMIN — SODIUM CHLORIDE: 0.9 INJECTION, SOLUTION INTRAVENOUS at 12:56

## 2020-07-04 RX ADMIN — MEXILETINE HYDROCHLORIDE 150 MG: 150 CAPSULE ORAL at 22:08

## 2020-07-04 RX ADMIN — SODIUM CHLORIDE, SODIUM LACTATE, POTASSIUM CHLORIDE, AND CALCIUM CHLORIDE 125 ML/HR: .6; .31; .03; .02 INJECTION, SOLUTION INTRAVENOUS at 17:09

## 2020-07-04 RX ADMIN — ACETAMINOPHEN 975 MG: 325 TABLET, FILM COATED ORAL at 05:22

## 2020-07-04 RX ADMIN — CEFAZOLIN 2000 MG: 1 INJECTION, POWDER, FOR SOLUTION INTRAVENOUS at 13:18

## 2020-07-04 RX ADMIN — EPHEDRINE SULFATE 5 MG: 50 INJECTION, SOLUTION INTRAVENOUS at 14:41

## 2020-07-04 RX ADMIN — SODIUM CHLORIDE, SODIUM LACTATE, POTASSIUM CHLORIDE, AND CALCIUM CHLORIDE: .6; .31; .03; .02 INJECTION, SOLUTION INTRAVENOUS at 15:07

## 2020-07-04 RX ADMIN — LAMOTRIGINE 200 MG: 100 TABLET ORAL at 08:22

## 2020-07-04 RX ADMIN — LIDOCAINE HYDROCHLORIDE 5 MG: 10 INJECTION, SOLUTION EPIDURAL; INFILTRATION; INTRACAUDAL; PERINEURAL at 13:13

## 2020-07-04 RX ADMIN — ENOXAPARIN SODIUM 30 MG: 30 INJECTION SUBCUTANEOUS at 22:07

## 2020-07-04 RX ADMIN — HYDROMORPHONE HYDROCHLORIDE 0.2 MG: 1 INJECTION, SOLUTION INTRAMUSCULAR; INTRAVENOUS; SUBCUTANEOUS at 22:06

## 2020-07-04 RX ADMIN — SODIUM CHLORIDE, SODIUM LACTATE, POTASSIUM CHLORIDE, AND CALCIUM CHLORIDE 125 ML/HR: .6; .31; .03; .02 INJECTION, SOLUTION INTRAVENOUS at 16:30

## 2020-07-04 RX ADMIN — EPHEDRINE SULFATE 15 MG: 50 INJECTION, SOLUTION INTRAVENOUS at 14:11

## 2020-07-04 RX ADMIN — PHENYLEPHRINE HYDROCHLORIDE 200 MCG: 10 INJECTION INTRAVENOUS at 13:51

## 2020-07-04 RX ADMIN — PHENYLEPHRINE HYDROCHLORIDE 200 MCG: 10 INJECTION INTRAVENOUS at 14:03

## 2020-07-04 RX ADMIN — SODIUM CHLORIDE: 0.9 INJECTION, SOLUTION INTRAVENOUS at 15:05

## 2020-07-04 RX ADMIN — POTASSIUM CHLORIDE 40 MEQ: 20 SOLUTION ORAL at 05:22

## 2020-07-04 RX ADMIN — HYDROMORPHONE HYDROCHLORIDE 0.2 MG: 1 INJECTION, SOLUTION INTRAMUSCULAR; INTRAVENOUS; SUBCUTANEOUS at 08:21

## 2020-07-04 RX ADMIN — FENTANYL CITRATE 25 MCG: 50 INJECTION, SOLUTION INTRAMUSCULAR; INTRAVENOUS at 13:13

## 2020-07-04 RX ADMIN — PHENYLEPHRINE HYDROCHLORIDE 200 MCG: 10 INJECTION INTRAVENOUS at 13:23

## 2020-07-04 RX ADMIN — PROPOFOL 150 MG: 10 INJECTION, EMULSION INTRAVENOUS at 13:13

## 2020-07-04 RX ADMIN — MEXILETINE HYDROCHLORIDE 150 MG: 150 CAPSULE ORAL at 08:22

## 2020-07-04 RX ADMIN — MEXILETINE HYDROCHLORIDE 150 MG: 150 CAPSULE ORAL at 00:11

## 2020-07-04 RX ADMIN — ALBUMIN (HUMAN): 12.5 SOLUTION INTRAVENOUS at 14:45

## 2020-07-04 RX ADMIN — PHENYLEPHRINE HYDROCHLORIDE 100 MCG: 10 INJECTION INTRAVENOUS at 13:17

## 2020-07-04 RX ADMIN — PHENYLEPHRINE HYDROCHLORIDE 200 MCG: 10 INJECTION INTRAVENOUS at 14:17

## 2020-07-04 RX ADMIN — ONDANSETRON 4 MG: 2 INJECTION INTRAMUSCULAR; INTRAVENOUS at 13:13

## 2020-07-04 RX ADMIN — FENTANYL CITRATE 25 MCG: 50 INJECTION, SOLUTION INTRAMUSCULAR; INTRAVENOUS at 16:09

## 2020-07-04 RX ADMIN — FLUVOXAMINE MALEATE 150 MG: 50 TABLET ORAL at 22:09

## 2020-07-04 RX ADMIN — DOCUSATE SODIUM 100 MG: 100 CAPSULE, LIQUID FILLED ORAL at 17:47

## 2020-07-04 RX ADMIN — FLUVOXAMINE MALEATE 150 MG: 50 TABLET ORAL at 01:33

## 2020-07-04 RX ADMIN — POTASSIUM CHLORIDE 40 MEQ: 1500 TABLET, EXTENDED RELEASE ORAL at 08:22

## 2020-07-04 RX ADMIN — ARIPIPRAZOLE 7.5 MG: 15 TABLET ORAL at 08:22

## 2020-07-04 RX ADMIN — ACETAMINOPHEN 975 MG: 325 TABLET, FILM COATED ORAL at 22:05

## 2020-07-04 RX ADMIN — OXYCODONE HYDROCHLORIDE 2.5 MG: 5 TABLET ORAL at 06:40

## 2020-07-04 RX ADMIN — PHENYLEPHRINE HYDROCHLORIDE 200 MCG: 10 INJECTION INTRAVENOUS at 14:24

## 2020-07-04 RX ADMIN — POTASSIUM CHLORIDE 20 MEQ: 14.9 INJECTION, SOLUTION INTRAVENOUS at 02:37

## 2020-07-04 RX ADMIN — PHENYLEPHRINE HYDROCHLORIDE 200 MCG: 10 INJECTION INTRAVENOUS at 14:41

## 2020-07-04 RX ADMIN — PHENYLEPHRINE HYDROCHLORIDE 100 MCG: 10 INJECTION INTRAVENOUS at 14:00

## 2020-07-04 RX ADMIN — PHENYLEPHRINE HYDROCHLORIDE 200 MCG: 10 INJECTION INTRAVENOUS at 13:41

## 2020-07-04 NOTE — ASSESSMENT & PLAN NOTE
-Acute minimally displaced right lateral osteophyte fracture of the T9 vertebral body  -neurologically intact  -pain control, PT OT

## 2020-07-04 NOTE — ASSESSMENT & PLAN NOTE
-Currently in sugar tong splint, neurologically intact  -7/4 to OR for ORIF of her left forearm radius and ulnar shaft fractures   - pain control, PT OT  - Patient does have high utilizer plan in place for apparent drug-seeking behavior  -holding anticoag pre OR

## 2020-07-04 NOTE — PROGRESS NOTES
Postop note:  Subjective:  No acute complaints  Patient is resting comfortably in bed eating her food  She denies any new chest pain or shortness of breath  No new nausea or vomiting  Objective:  General:  No acute distress  Cardiac:  Regular rate and rhythm  Lungs:  CTA bilaterally  Abdomen:  Nontender, nondistended  Extremities:  Sling in place on left upper extremity  Neuro:  GCS 15    Assessment and plan:  - re-evaluate in a m   - repeat chest x-ray prior to OR was stable  - restart DVT prophylaxis  - a m   Labs  - continue with p r n  pain control  - neurovascular checks

## 2020-07-04 NOTE — PHYSICAL THERAPY NOTE
Physical Therapy Cancellation Note    PT orders received, chart reviewed  Pt with plans for OR today 7/4/20  PT to continue to follow and see pt post procedure when appropriate and able      Es Roberts, PT, DPT

## 2020-07-04 NOTE — PROGRESS NOTES
Progress Note - Dannielle Cha 1960, 62 y o  female MRN: [id number]    Unit/Bed#: [facility] 628-01 Encounter: [id number]    Primary Care Provider: Christina Ramirez DO   Date and time admitted to hospital: 7/3/2020  8:01 PM        * Fall down stairs  Assessment & Plan  As below including pt/ot    Closed fracture of distal ends of left radius and ulna  Assessment & Plan  -Currently in sugar tong splint, neurologically intact  -7/4 to OR for ORIF of her left forearm radius and ulnar shaft fractures   - pain control, PT OT  - Patient does have high utilizer plan in place for apparent drug-seeking behavior  -holding anticoag pre OR    Fracture of multiple ribs of left side  Assessment & Plan  -Minimally displaced right anterior 4th, 5th, 6th rib fractures without pneumothorax  -Peripheral ground-glass opacities in bilateral upper lobes, possibly from contusions versus infectious versus other inflammatory process  -Rib fracture protocol  - follow-up chest x-ray 7/4: no acute cardiopulmonary disease, acute right anterior rib fractures on CT are not visible, no PTX    Bilateral pulmonary contusion  Assessment & Plan  See rib fractures section above    T9 vertebral fracture (HCC)  Assessment & Plan  -Acute minimally displaced right lateral osteophyte fracture of the T9 vertebral body  -neurologically intact  -pain control, PT OT    Hematoma of parietal scalp  Assessment & Plan  7/3 CT H no acute intracranial abnormality, no calvarial fracture    Progress Note - Trauma   Dannielle Cha 62 y o  female [id number]   Unit/Bed#: [facility] 628-01 Encounter: [id number]     ASSESSMENT:   Patient Active Problem List   Diagnosis    Bipolar depression ([address] )    Hypokalemia    Adrenal insufficiency ([place]'s disease) (HCC)    Vertigo    Fibromyalgia    Spinal stenosis of lumbar region    Osteoarthritis of lumbosacral spine without myelopathy    Migraine    HLD (hyperlipidemia)    Syncope    Orthostatic hypotension    History of TIAs    Intractable migraine    Neck pain    Low back pain    PE (pulmonary thromboembolism) (HCC)    Hypertension    Bipolar disorder    Chronic back pain    DVT, lower extremity (HCC)    Anxiety    Leukocytosis    Chronic anticoagulation    Anemia    Ambulatory dysfunction    Edema    Right arm pain    Dizziness    Intractable migraine without aura and without status migrainosus    Closed head injury with brief loss of consciousness (HCC)    Shoulder pain    Class 2 obesity due to excess calories with body mass index (BMI) of 35 0 to 35 9 in adult    Closed fracture of distal ends of left radius and ulna    Fall down stairs    Fracture of multiple ribs of left side    Hematoma of parietal scalp    T9 vertebral fracture (HCC)    Bilateral pulmonary contusion          Disposition: inpatient      SUBJECTIVE:  Chief Complaint:  Left wrist pain after fall down stairs    Subjective:  No acute events overnight  Odd affect per nursing    Patient appears comfortable in bed, however states "My pain is 10/10 "       OBJECTIVE:     Meds/Allergies     Current Facility-Administered Medications:     acetaminophen (TYLENOL) tablet 975 mg, 975 mg, Oral, Q8H [address], Jorge Jones MD, 975 mg at 07/04/20 0522    ARIPiprazole (ABILIFY) tablet 7 5 mg, 7 5 mg, Oral, Daily, Anel Mcintosh MD, 7 5 mg at 07/04/20 9488    cholecalciferol (VITAMIN D3) tablet 5,000 Units, 5,000 Units, Oral, Daily, Jorge Jones MD    docusate sodium (COLACE) capsule 100 mg, 100 mg, Oral, BID, Leslye Jones MD    ferrous sulfate tablet 325 mg, 325 mg, Oral, Daily With Breakfast, Jorge Jones MD    fluvoxaMINE (LUVOX) tablet 150 mg, 150 mg, Oral, HS, Jorge Jones MD, 150 mg at 07/04/20 0133    gabapentin (NEURONTIN) capsule 300 mg, 300 mg, Oral, TID, Jorge Jones MD, Stopped at 07/04/20 0900    HYDROmorphone (DILAUDID) injection 0 2 mg, 0 2 mg, Intravenous, Q4H PRN, Jorge Jones MD, 0 2 mg at 07/04/20 2322   HYDROmorphone (DILAUDID) tablet 1 mg, 1 mg, Oral, Q6H PRN, Marie Jones MD    lamoTRIgine (LaMICtal) tablet 200 mg, 200 mg, Oral, Daily, Leslye Jones MD, 200 mg at 07/04/20 6280    lidocaine (LIDODERM) 5 % patch 1 patch, 1 patch, Topical, Daily, Marie Jones MD, Stopped at 07/04/20 0818    meclizine (ANTIVERT) tablet 12 5 mg, 12 5 mg, Oral, Q8H PRN, Marie Jones MD    mexiletine (MEXITIL) capsule 150 mg, 150 mg, Oral, TID, Leslye Jones MD, 150 mg at 07/04/20 0822    naloxone (NARCAN) 0 04 mg/mL syringe 0 04 mg, 0 04 mg, Intravenous, Q1MIN PRN, Chris Gutierrez MD    ondansetron (ZOFRAN) injection 4 mg, 4 mg, Intravenous, Q6H PRN, Marie Jones MD     Vitals:   Vitals:    07/04/20 0658   BP: 131/77   Pulse: 75   Resp: 16   Temp: 98 5 °F (36 9 °C)   SpO2: 97%       Intake/Output:  I/O       07/02 0701 - 07/03 0700 07/03 0701 - 07/04 0700 07/04 0701 - 07/05 0700    Urine  387     Total Output  387     Net  -387                   Nutrition/GI Proph/Bowel Reg:  Colace    Physical Exam:   Physical Exam   Constitutional: She is oriented to person, place, and time  She appears well-developed and well-nourished  No distress  HENT:   Head: Normocephalic  Nose: Nose normal    Mouth/Throat: Oropharynx is clear and moist    Eyes: Conjunctivae and EOM are normal  Right eye exhibits no discharge  Left eye exhibits no discharge  Neck: Normal range of motion  Cardiovascular: Normal rate  Pulmonary/Chest: Effort normal  No stridor  No respiratory distress  Abdominal: Soft  She exhibits no distension  There is no tenderness  There is no rebound and no guarding  Musculoskeletal:   Left arm splint, normal cap refill, sensation to light touch   Neurological: She is alert and oriented to person, place, and time  No cranial nerve deficit  Skin: Skin is warm and dry  Capillary refill takes less than 2 seconds  She is not diaphoretic     Psychiatric:   Flat affect, appears depressed   Nursing note and vitals reviewed  Invasive Devices     Peripheral Intravenous Line            Peripheral IV 07/03/20 Right Antecubital less than 1 day                 Lab Results: Results: I have personally reviewed pertinent reports  Imaging/EKG Studies: Results: I have personally reviewed pertinent reports      VTE Prophylaxis:Sequential compression device (Venodyne)  holding home Eliquis for OR

## 2020-07-04 NOTE — OCCUPATIONAL THERAPY NOTE
Occupational Therapy Cancellation Note    OT orders received and pt's chart reviewed  Pt is scheduled to go to the OR today 7/4/20 for ORIF distal radius/ulnar fxs  OT to continue to follow and attempt initial evaluation when appropriate      LUISA Mark

## 2020-07-04 NOTE — H&P
H&P Exam - Trauma   Aruna Quintanilla 62 y o  female MRN: [id number]  Unit/Bed#: [facility] 628-01 Encounter: [id number]    Assessment/Plan   Trauma Alert: transfer   Model of Arrival: ambulance   Trauma Team: alonso Gregorio   Consultants:  Orthopedics, PT, OT    Trauma Active Problems:   -left distal shaft radius/ulna fractures with significant angulation  -midline high parietal scalp hematoma  -acute right anterior 4th, 5th, 6th rib fractures  -acute minimally displaced right lateral osteophyte fracture of T9 vertebral body  -peripheral ground-glass opacities in bilateral upper lobes, possible pulmonary contusions  -hypokalemia         Trauma Plan:  -Orthopedics consult: plan to go to OR tomorrow for ORIF distal radius/ulnar frx's   -Rib frx protocol   -Pain control as needed   -Hold anticoagulation for OR tomorrow, stable for OR      Chief Complaint:  Fall down stairs    History of Present Illness   HPI:  Aruna Quintanilla is a 62 y o  female with history of DVT /PE on Eliquis who presents with as a trauma transfer after she was found to have 3 right rib fractures as well as a left distal radius/ulna shaft fracture after falling down about 15 steps today  Per EMS, the patient was found by her   She had recently been admitted to the hospital for syncope and hypokalemia  Denies any loss of consciousness but does believe that she hit her head  Tetanus up-to-date  Currently the patient complains of diffuse pain throughout her body  There is no sharp shooting pain in her neck  Mechanism:Fall      12-point, complete review of systems was reviewed and negative except as stated above             Past Medical History:   Diagnosis Date    Ad[facility]Andr[facility]'s disease) ([facility] [address] [facility]     Bipolar disorder     C  difficile diarrhea     Cervical radiculopathy     Chronic back pain     DVT, lower extremity (HCC) 1991    Fibromyalgia     History of TIAs     cannot remember details    Hypertension     Hypokalemia  Migraine     MRSA (methicillin resistant Staphylococcus aureus) 2012    nasal swab negative 19    Psychiatric disorder     Anxiety, major depression, bipolar    Spinal stenosis     Syncope     orthostatic hypotension     Past Surgical History:   Procedure Laterality Date    APPENDECTOMY      GASTRIC RESTRICTION SURGERY      Gastric Sleeve 2015    HYSTERECTOMY      JOINT REPLACEMENT      Left Knee  and Right Hip      Social History   Social History     Substance and Sexual Activity   Alcohol Use Yes    Frequency: Monthly or less    Binge frequency: Never    Comment: occasionally     Social History     Substance and Sexual Activity   Drug Use Never     Social History     Tobacco Use   Smoking Status Former Smoker    Packs/day: 0 20    Types: Cigarettes    Last attempt to quit:     Years since quittin 5   Smokeless Tobacco Never Used     E-Cigarette/Vaping    E-Cigarette Use Never User      E-Cigarette/Vaping Substances     Immunization History   Administered Date(s) Administered    Influenza, recombinant, quadrivalent,injectable, preservative free 10/03/2019     Last Tetanus:   Family History: Non-contributory      Meds/Allergies   all current active meds have been reviewed    Allergies   Allergen Reactions    Ketorolac Itching and Other (See Comments)     [toradol] Itching, hives    Mushroom Extract Complex          PHYSICAL EXAM      Objective   Vitals:   First set: Temperature: (!) 100 7 °F (38 2 °C) (20)  Pulse: 89 (20)  Respirations: 18 (20)  Blood Pressure: 136/68 (20)    Primary Survey:   (A) Airway:  Intact  (B) Breathing:  Bilateral breath sounds  (C) Circulation: Pulses:   normal  (D) Disabliity:  GCS 15  (E) Expose:  Completed    Secondary Survey:   Physical Exam   Constitutional: She is oriented to person, place, and time  She appears well-developed and well-nourished  No distress     HENT:   Head: Normocephalic  Nose: Nose normal    Hematoma at central frontal scalp  No hemotympanum bilaterally     Eyes: Pupils are equal, round, and reactive to light  Conjunctivae and EOM are normal    Neck: Normal range of motion  Neck supple  No pinpoint midline cervical tenderness   Cardiovascular: Normal rate, regular rhythm and normal heart sounds  No murmur heard  Pulmonary/Chest: Effort normal and breath sounds normal  No respiratory distress  She has no wheezes  Abdominal: Soft  Bowel sounds are normal  There is no tenderness  Musculoskeletal: Normal range of motion  She exhibits no edema  Deformity and hematoma noted at left distal forearm  Neurological: She is alert and oriented to person, place, and time  No cranial nerve deficit or sensory deficit  She exhibits normal muscle tone  Coordination normal    5/5 strength in upper and lower extremities other than about the left wrist where exam is limited by pain    Skin: Skin is warm and dry  No rash noted  Diffuse ecchymoses in various stages of healing on upper extremities  Small superficial abrasion to anterior RLE  Psychiatric: She has a normal mood and affect  Nursing note and vitals reviewed        Invasive Devices     Peripheral Intravenous Line            Peripheral IV 07/03/20 Right Antecubital less than 1 day                Lab Results:   BMP/CMP:   Lab Results   Component Value Date    SODIUM 141 07/03/2020    K 2 5 (LL) 07/03/2020     07/03/2020    CO2 33 (H) 07/03/2020    BUN 24 07/03/2020    CREATININE 1 29 07/03/2020    CALCIUM 8 1 (L) 07/03/2020    EGFR 45 07/03/2020   , CBC:   Lab Results   Component Value Date    WBC 13 51 (H) 07/03/2020    HGB 11 8 07/03/2020    HCT 35 9 07/03/2020    MCV 98 07/03/2020     07/03/2020    MCH 32 1 07/03/2020    MCHC 32 9 07/03/2020    RDW 14 0 07/03/2020    MPV 10 4 07/03/2020    NRBC 0 07/03/2020    and ISTAT: No components found for: VBG  Imaging/EKG Studies:   CTH:  Midline high parietal scalp hematoma without a calvarial fracture    CT C-spine:  Unremarkable    CT chest abdomen pelvis:   1   Acute minimally displaced right anterior 4th, 5th, and 6th rib fractures  No pneumothorax      2  Acute minimally displaced right lateral osteophyte fracture of the T9 vertebral body      3   Peripheral groundglass opacities in the bilateral upper lobes, nonspecific, possibly pulmonary contusions and/or infectious/inflammatory      4  No acute traumatic injury in the abdomen or pelvis  No acute osseous abnormality in the lumbar spine      5  Incidentally imaged acute displaced and angulated left radial and ulnar distal shaft fractures      X-ray left hand:  Significantly angulated left distal shaft ulnar and radius fractures      Code Status: Level 1 - Full Code  Advance Directive and Living Will:      Power of :    POLST:

## 2020-07-04 NOTE — ANESTHESIA POSTPROCEDURE EVALUATION
Post-Op Assessment Note    CV Status:  Stable  Pain Score: 0    Pain management: adequate     Mental Status:  Alert and awake   Hydration Status:  Euvolemic   PONV Controlled:  Controlled   Airway Patency:  Patent   Post Op Vitals Reviewed: Yes      Staff: CRNA   Comments: 99/46, 84, 16  +97% 6 FM          BP (!) 99/46 (07/04/20 1551)    Temp (!) 97 1 °F (36 2 °C) (07/04/20 1551)    Pulse 85 (07/04/20 1551)   Resp      SpO2

## 2020-07-04 NOTE — ASSESSMENT & PLAN NOTE
-Minimally displaced right anterior 4th, 5th, 6th rib fractures without pneumothorax  -Peripheral ground-glass opacities in bilateral upper lobes, possibly from contusions versus infectious versus other inflammatory process  -Rib fracture protocol  - follow-up chest x-ray 7/4: no acute cardiopulmonary disease, acute right anterior rib fractures on CT are not visible, no PTX

## 2020-07-04 NOTE — CONSULTS
Orthopedics   Aruna Quintanilla 62 y o  female MRN: [id number]  Unit/Bed#: [facility] 628-01      Chief Complaint:   left forearm pain    HPI:   62 y o  right hand dominant female presenting to ED after fall down flight of stairs complaining of  left forearm pain  Pain is well localized to the forearm without radiation  Made worse with motion or contact to the area  Improves with rest   Denies any numbness or tingling  Other injuries include left sided rib fractures and pulmonary contusion and scalp hematoma      Review Of Systems:   · Skin: See examination below  · Neuro: See HPI  · Musculoskeletal: See HPI  · 14 point review of systems negative except as stated above     Past Medical History:   Past Medical History:   Diagnosis Date    Adrenal insufficiency (Andr[facility]'s disease) ([facility] [address] )     Bipolar disorder     C  difficile diarrhea     Cervical radiculopathy     Chronic back pain     [facility]    Fibromyalgia     History of TIAs     cannot remember details    Hypertension     Hypokalemia     Migraine     MRSA (methicillin resistant Staphylococcus aureus) 07/20/2012    nasal swab negative 4/5/19    Psychiatric disorder     Anxiety, major depression, bipolar    Spinal stenosis     Syncope 2014    orthostatic hypotension       Past Surgical History:   Past Surgical History:   Procedure Laterality Date    APPENDECTOMY      GASTRIC RESTRICTION SURGERY      Gastric Sleeve Nov 2015    HYSTERECTOMY      JOINT REPLACEMENT      Left Knee 2011 and Right Hip 2010       Family History:  Family history reviewed and non-contributory  Family History   Problem Relation Age of Onset    Breast cancer Mother     Migraines Mother     Arthritis Mother     Kidney disease Father     Heart disease Father     Diabetes Father     Arthritis Father     Brain cancer Brother     Seizures Brother        Social History:  Social History     Socioeconomic History    Marital status: /Civil Union Spouse name: None    Number of children: None    Years of education: None    Highest education level: None   Occupational History    None   Social Needs    Financial resource strain: None    Food insecurity:     Worry: None     Inability: None    Transportation needs:     Medical: None     Non-medical: None   Tobacco Use    Smoking status: Former Smoker     Packs/day: 0 20     Types: Cigarettes     Last attempt to quit:      Years since quittin 5    Smokeless tobacco: Never Used   Substance and Sexual Activity    Alcohol use: Yes     Frequency: Monthly or less     Binge frequency: Never     Comment: occasionally    Drug use: Never    Sexual activity: None   Lifestyle    Physical activity:     Days per week: None     Minutes per session: None    Stress: None   Relationships    Social connections:     Talks on phone: None     Gets together: None     Attends [employer] service: None     Active member of club or organization: None     Attends meetings of clubs or organizations: None     Relationship status: None    Intimate partner violence:     Fear of current or ex partner: None     Emotionally abused: None     Physically abused: None     Forced sexual activity: None   Other Topics Concern    None   Social History Narrative    None       Allergies:    Allergies   Allergen Reactions    Ketorolac Itching and Other (See Comments)     [toradol] Itching, hives    Mushroom Extract Complex            Labs:  0   Lab Value Date/Time    HCT 35 9 2020 1653    HCT 41 2 2020 1400    HCT 37 3 10/16/2019 0448    HCT 38 1 2015 1604    HCT 33 9 (L) 2015 0537    HCT 33 3 (L) 2015 0516    HGB 11 8 2020 1653    HGB 13 6 2020 1400    HGB 11 6 10/16/2019 0448    HGB 12 3 2015 1604    HGB 10 9 (L) 2015 0537    HGB 10 8 (L) 2015 0516    INR 1 15 2019 1154    INR 0 90 2015 1030    WBC 13 51 (H) 2020 1653    WBC 8 73 2020 1400    WBC 7 04 10/16/2019 0448    WBC 7 66 07/07/2015 1604    WBC 8 47 07/03/2015 0537    WBC 8 02 07/01/2015 0516    ESR 24 (H) 10/05/2019 0548       Meds:    Current Facility-Administered Medications:     acetaminophen (TYLENOL) tablet 975 mg, 975 mg, Oral, Q8H [address], Harvinder Alicea MD    [START ON 7/4/2020] ARIPiprazole (ABILIFY) tablet 7 5 mg, 7 5 mg, Oral, Daily, MD Ching Matt  [START ON 7/4/2020] ceFAZolin (ANCEF) 3,000 mg in dextrose 5 % 100 mL IVPB, 3,000 mg, Intravenous, Once, MD Ching Joseph  [START ON 7/4/2020] cholecalciferol (VITAMIN D3) tablet 5,000 Units, 5,000 Units, Oral, Daily, Leslye Jones MD    docusate sodium (COLACE) capsule 100 mg, 100 mg, Oral, BID, MD Ching Matt  [START ON 7/4/2020] ferrous sulfate tablet 325 mg, 325 mg, Oral, Daily With Breakfast, Leslye Jones MD    fluvoxaMINE (LUVOX) tablet 150 mg, 150 mg, Oral, HS, Leslye Jones MD    gabapentin (NEURONTIN) capsule 300 mg, 300 mg, Oral, TID, MD Ching Matt  [START ON 7/4/2020] lamoTRIgine (LaMICtal) tablet 200 mg, 200 mg, Oral, Daily, MD Ching Matt  [START ON 7/4/2020] lidocaine (LIDODERM) 5 % patch 1 patch, 1 patch, Topical, Daily, Leslye Jones MD    meclizine (ANTIVERT) tablet 12 5 mg, 12 5 mg, Oral, Q8H PRN, Leslye Jones MD    mexiletine (MEXITIL) capsule 150 mg, 150 mg, Oral, TID, Leslye Jones MD    ondansetron (ZOFRAN) injection 4 mg, 4 mg, Intravenous, Q6H PRN, Harvinder Alicea MD    Blood Culture:   No results found for: BLOODCX    Wound Culture:   No results found for: WOUNDCULT    Ins and Outs:  No intake/output data recorded  Physical Exam:   /63   Pulse 85   Temp 100 1 °F (37 8 °C)   Resp 20   SpO2 96%   Gen: Alert and oriented to person, place, time  HEENT: EOMI, eyes clear, moist mucus membranes, hearing intact  Respiratory: Bilateral chest rise   No audible wheezing found  Cardiovascular: Regular Rate and Rhythm  Abdomen: soft nontender/nondistended  Musculoskeletal: left upper extremity  · Skin intact and ecchymosis over ulnar border  · Tender to palpation over mid portion of forearm  · Forearm soft and compressible, no pain with passive stretch of digits  · Sensation intact to median, radial, and ulnar nerve distributions  · Motor intact to ain/pin/m/r/u nerve distributions, limited by effort  · Palpable radial pulse    Radiology:   I personally reviewed the films  X-rays left forearm shows Both Bone fracture    Procedure: Reduction and splint application    Once adequate anesthesia was obtained a gentle closed reduction maneuver was performed and pt was placed in a well padded sugartong splint  Pt tolerated the procedure well and was neurovascularly intact both pre and post procedure  Post reduction orthogonal x rays will be reviewed upon completion     _*_*_*_*_*_*_*_*_*_*_*_*_*_*_*_*_*_*_*_*_*_*_*_*_*_*_*_*_*_*_*_*_*_*_*_*_*_*_*_*_*    Assessment:  62 y  o female with a left Both Bone fracture status post closed recution    Plan:   · Pt reduced and splinted with sugar tong splint  · Analgesics for pain  · NPO at midnight  · To OR for open reduction internal fixation of left BBFF  · There is no height or weight on file to calculate BMI  moderately obese  Recommend behavior modifications, nutrition and physical activity    · Dispo: Ortho will follow  · Consent obtained  · Clearance per trauma team     Case discussed with and reviewed with Dr Alf Rm MD

## 2020-07-04 NOTE — OP NOTE
OPERATIVE REPORT  PATIENT NAME: Parish Monteiro  :  1960  MRN: [id number]  Pt Location: [facility]    SURGERY DATE: 20    Surgeon(s) and Role:     * Brooklynn Long MD - Primary     * Rosaura Tate MD - Assisting     * Nilsa Marroquin MD - Assisting    Pre-Op Diagnosis:  Left radius and ulnar shaft fracture    Post-Op Diagnosis:  Same    Procedure(s):  Procedure(s):  Open reduction and internal fixation left radius and ulnar shaft fracture  Application short-arm splint    Specimen(s):  * No orders in the log *    Estimated Blood Loss:   Minimal      Anesthesia Type:   General    Operative Indications: The patient has a history of a left radius and ulnar shaft fracture that was Closed, Shortened, Angulated, Displaced and Extra-articular  The decision was made to bring the patient to the operating room for open reduction and internal fixation  Risks of the procedure were explained which include, but are not limited to bleeding; infection; damage to nerves, arteries,veins, tendons; scar; pain; need for reoperation; failure to give desired result; delayed union, malunion, nonunion; and risks of anaesthesia  All questions were answered to satisfaction and they were willing to proceed  Operative Findings:  Transverse fracture of the ulnar shaft and transverse fracture with small butterfly fragment of the radial shaft on the left    Complications:   None    Procedure and Technique:  After the patient, site, and procedure were identified, the patient was brought into the operating room in a supine position  General anaesthesia was provided  A well padded tourniquet was applied to the extremity, set at 250 mmHg  The  left upper extremity was then prepped and drapped in a normal, sterile, orthopedic fashion  After the patient, site, and procedure identified attention was turned towards the left arm    An Esmarch bandage was used to exsanguinate the limb the tourniquet was inflated to 250 mmHg   At this point time, a standard volar Everlene Roers approach was then made to the radial shaft  We dissected down through skin subcutaneous tissues protecting superficial sensory branch of the radial nerve, radial artery, median nerve, palmar cutaneous branch of median nerve  We came down the interval between the flexor carpi radialis and pronator teres muscle proximally and flexor carpi radialis and brachioradialis muscle distally  This brought us down the level of the radial shaft as we retracted the flexor pollicis longus and pronator quadratus in an ulnar based direction  Transverse fracture of the radius was then noted this was reduced and provisionally held with K-wire  A 7 hole 3 5 mm Synthes small fragment plate was then selected and applied to the volar surface of distal radius  This was then secured and compression type fashion with 3 bicortical nonlocking screws proximally and 3 bicortical nonlocking screws distally  X-rays were taken which confirmed good reduction and plate fixation as well as restoration of the radial bow  Attention was then turned to the subcutaneous border of the ulna  A subcutaneous approach to the ulnar shaft was then made with care taken to protect the dorsal sensory branch of the ulnar nerve  We dissected down through skin subcutaneous tissues while maintaining hemostasis  The interval between the flexion extensor carpi ulnaris was then opened  This delivered this to the level of the ulnar shaft were identified a transverse fracture  We cleaned off the periosteum from the volar side of the ulna to allow for plate fixation  The plate was pre bent in a 7 hole 2 7 mm Synthes plate was then selected  This was secured to the bone in a compression type fashion with 3 bicortical nonlocking screws distally and 3 bicortical nonlocking screws proximally  AP and lateral radiographs confirmed good reduction and plate placement       At the completion of the procedure, the patient had full wrist flexion, extension, pronation, and supination with a stable distal radioulnar joint and no crepitation  The tourniquet was then deflated  At the completion of the procedure, hemostasis was obtained with cautery and direct pressure  The wounds were copiously irrigated with sterile solution  The wounds were closed with Vicryl and Prolene  Sterile dressings were applied, including Xeroform, gauze, tweeners, webril, ACE and Volar Splint  Please note, all sponge, needle, and instrument counts were correct prior to closure  Loupe magnification was utilized  The patient tolerated the procedure well       I was present for all critical portions of the procedure    Patient Disposition:  PACU , hemodynamically stable and extubated and stable    SIGNATURE: Elliot Friday, MD  DATE: 07/04/20  TIME: 3:13 PM

## 2020-07-04 NOTE — ANESTHESIA PREPROCEDURE EVALUATION
Review of Systems/Medical History  Patient summary reviewed  Chart reviewed      Cardiovascular  EKG reviewed, Exercise tolerance (METS): >4,  Hyperlipidemia, Hypertension controlled, DVT  PE (3/2020),  Pulmonary  Negative pulmonary ROS        GI/Hepatic  Negative GI/hepatic ROS          Negative  ROS        Endo/Other  Andrés's disease,      GYN  Negative gynecology ROS          Hematology  Anemia ,  Coagulation disorder currently taking oral anticoagulants,    Musculoskeletal  Back pain , spinal stenosis,   Comment: S/p fall  Left distal radius and ulna fracture      Neurology  Negative neurology ROS   Fibromyalgia   Psychology   Anxiety, Depression , bipolar disorder,   Chronic pain,            Physical Exam    Airway    Mallampati score: II  TM Distance: >3 FB  Neck ROM: full     Dental   upper dentures,     Cardiovascular  Rhythm: regular, Rate: normal, Cardiovascular exam normal    Pulmonary  Pulmonary exam normal Breath sounds clear to auscultation,     Other Findings        Anesthesia Plan  ASA Score- 3     Anesthesia Type- general with ASA Monitors  Additional Monitors:   Airway Plan: ETT  Plan Factors-    Induction- intravenous  Postoperative Plan- Plan for postoperative opioid use  Planned trial extubation    Informed Consent- Anesthetic plan and risks discussed with patient  I personally reviewed this patient with the CRNA  Discussed and agreed on the Anesthesia Plan with the LEON Adamson

## 2020-07-04 NOTE — PROGRESS NOTES
Pt seemed confused and lethargic after admission of 1mg dilaudid for bedside reduction of L arm  Pt sating 88% on room air, woken easily but quickly fell back to sleep  Sometimes slurring words and confused  Pt not exhibiting signs of respiratory distress  Nurse placed pt on 2L NC, saturating level increased to 96%  Per Trang Splinter w/ trauma  Monitor for now but administer narcan of needed  No other interventions needed at this time  Narcan not currently needed  Will continue to monitor

## 2020-07-04 NOTE — PLAN OF CARE
Problem: Potential for Falls  Goal: Patient will remain free of falls  Description  INTERVENTIONS:  - Assess patient frequently for physical needs  -  Identify cognitive and physical deficits and behaviors that affect risk of falls    -  [place] fall precautions as indicated by assessment   - Educate patient/family on patient safety including physical limitations  - Instruct patient to call for assistance with activity based on assessment  - Modify environment to reduce risk of injury  - Consider OT/PT consult to assist with strengthening/mobility  Outcome: Progressing     Problem: PAIN - ADULT  Goal: Verbalizes/displays adequate comfort level or baseline comfort level  Description  Interventions:  - Encourage patient to monitor pain and request assistance  - Assess pain using appropriate pain scale  - Administer analgesics based on type and severity of pain and evaluate response  - Implement non-pharmacological measures as appropriate and evaluate response  - Consider cultural and social influences on pain and pain management  - Notify physician/advanced practitioner if interventions unsuccessful or patient reports new pain  Outcome: Progressing     Problem: INFECTION - ADULT  Goal: Absence or prevention of progression during hospitalization  Description  INTERVENTIONS:  - Assess and monitor for signs and symptoms of infection  - Monitor lab/diagnostic results  - Monitor all insertion sites, i e  indwelling lines, tubes, and drains  - Monitor endotracheal if appropriate and nasal secretions for changes in amount and color  - [place] appropriate cooling/warming therapies per order  - Administer medications as ordered  - Instruct and encourage patient and family to use good hand hygiene technique  - Identify and instruct in appropriate isolation precautions for identified infection/condition  Outcome: Progressing     Problem: SAFETY ADULT  Goal: Patient will remain free of falls  Description  INTERVENTIONS:  - Assess patient frequently for physical needs  -  Identify cognitive and physical deficits and behaviors that affect risk of falls    -  [place] fall precautions as indicated by assessment   - Educate patient/family on patient safety including physical limitations  - Instruct patient to call for assistance with activity based on assessment  - Modify environment to reduce risk of injury  - Consider OT/PT consult to assist with strengthening/mobility  Outcome: Progressing  Goal: Maintain or return to baseline ADL function  Description  INTERVENTIONS:  -  Assess patient's ability to carry out ADLs; assess patient's baseline for ADL function and identify physical deficits which impact ability to perform ADLs (bathing, care of mouth/teeth, toileting, grooming, dressing, etc )  - Assess/evaluate cause of self-care deficits   - Assess range of motion  - Assess patient's mobility; develop plan if impaired  - Assess patient's need for assistive devices and provide as appropriate  - Encourage maximum independence but intervene and supervise when necessary  - Involve family in performance of ADLs  - Assess for home care needs following discharge   - Consider OT consult to assist with ADL evaluation and planning for discharge  - Provide patient education as appropriate  Outcome: Progressing  Goal: Maintain or return mobility status to optimal level  Description  INTERVENTIONS:  - Assess patient's baseline mobility status (ambulation, transfers, stairs, etc )    - Identify cognitive and physical deficits and behaviors that affect mobility  - Identify mobility aids required to assist with transfers and/or ambulation (gait belt, sit-to-stand, lift, walker, cane, etc )  - [place] fall precautions as indicated by assessment  - Record patient progress and toleration of activity level on Mobility SBAR; progress patient to next Phase/Stage  - Instruct patient to call for assistance with activity based on assessment  - Consider rehabilitation consult to assist with strengthening/weightbearing, etc   Outcome: Progressing     Problem: DISCHARGE PLANNING  Goal: Discharge to home or other facility with appropriate resources  Description  INTERVENTIONS:  - Identify barriers to discharge w/patient and caregiver  - Arrange for needed discharge resources and transportation as appropriate  - Identify discharge learning needs (meds, wound care, etc )  - Arrange for interpretive services to assist at discharge as needed  - Refer to Case Management Department for coordinating discharge planning if the patient needs post-hospital services based on physician/advanced practitioner order or complex needs related to functional status, cognitive ability, or social support system  Outcome: Progressing     Problem: Knowledge Deficit  Goal: Patient/family/caregiver demonstrates understanding of disease process, treatment plan, medications, and discharge instructions  Description  Complete learning assessment and assess knowledge base    Interventions:  - Provide teaching at level of understanding  - Provide teaching via preferred learning methods  Outcome: Progressing

## 2020-07-05 ENCOUNTER — APPOINTMENT (INPATIENT)
Dept: RADIOLOGY | Facility: HOSPITAL | Age: 60
DRG: 511 | End: 2020-07-05
Payer: COMMERCIAL

## 2020-07-05 LAB
ANION GAP SERPL CALCULATED.3IONS-SCNC: 2 MMOL/L (ref 4–13)
BASOPHILS # BLD AUTO: 0.01 THOUSANDS/ΜL (ref 0–0.1)
BASOPHILS NFR BLD AUTO: 0 % (ref 0–1)
BUN SERPL-MCNC: 11 MG/DL (ref 5–25)
CALCIUM SERPL-MCNC: 8.1 MG/DL (ref 8.3–10.1)
CHLORIDE SERPL-SCNC: 108 MMOL/L (ref 100–108)
CO2 SERPL-SCNC: 32 MMOL/L (ref 21–32)
CREAT SERPL-MCNC: 0.63 MG/DL (ref 0.6–1.3)
EOSINOPHIL # BLD AUTO: 0.3 THOUSAND/ΜL (ref 0–0.61)
EOSINOPHIL NFR BLD AUTO: 3 % (ref 0–6)
ERYTHROCYTE [DISTWIDTH] IN BLOOD BY AUTOMATED COUNT: 14.7 % (ref 11.6–15.1)
GFR SERPL CREATININE-BSD FRML MDRD: 97 ML/MIN/1.73SQ M
GLUCOSE SERPL-MCNC: 79 MG/DL (ref 65–140)
HCT VFR BLD AUTO: 31.3 % (ref 34.8–46.1)
HGB BLD-MCNC: 10.1 G/DL (ref 11.5–15.4)
IMM GRANULOCYTES # BLD AUTO: 0.1 THOUSAND/UL (ref 0–0.2)
IMM GRANULOCYTES NFR BLD AUTO: 1 % (ref 0–2)
LYMPHOCYTES # BLD AUTO: 2.74 THOUSANDS/ΜL (ref 0.6–4.47)
LYMPHOCYTES NFR BLD AUTO: 25 % (ref 14–44)
MCH RBC QN AUTO: 31.9 PG (ref 26.8–34.3)
MCHC RBC AUTO-ENTMCNC: 32.3 G/DL (ref 31.4–37.4)
MCV RBC AUTO: 99 FL (ref 82–98)
MONOCYTES # BLD AUTO: 0.83 THOUSAND/ΜL (ref 0.17–1.22)
MONOCYTES NFR BLD AUTO: 8 % (ref 4–12)
NEUTROPHILS # BLD AUTO: 6.85 THOUSANDS/ΜL (ref 1.85–7.62)
NEUTS SEG NFR BLD AUTO: 63 % (ref 43–75)
NRBC BLD AUTO-RTO: 0 /100 WBCS
PLATELET # BLD AUTO: 205 THOUSANDS/UL (ref 149–390)
PMV BLD AUTO: 10.7 FL (ref 8.9–12.7)
POTASSIUM SERPL-SCNC: 3.7 MMOL/L (ref 3.5–5.3)
RBC # BLD AUTO: 3.17 MILLION/UL (ref 3.81–5.12)
SODIUM SERPL-SCNC: 142 MMOL/L (ref 136–145)
WBC # BLD AUTO: 10.83 THOUSAND/UL (ref 4.31–10.16)

## 2020-07-05 PROCEDURE — NC001 PR NO CHARGE: Performed by: ORTHOPAEDIC SURGERY

## 2020-07-05 PROCEDURE — 99232 SBSQ HOSP IP/OBS MODERATE 35: CPT | Performed by: PHYSICIAN ASSISTANT

## 2020-07-05 PROCEDURE — 97167 OT EVAL HIGH COMPLEX 60 MIN: CPT

## 2020-07-05 PROCEDURE — 73090 X-RAY EXAM OF FOREARM: CPT

## 2020-07-05 PROCEDURE — 80048 BASIC METABOLIC PNL TOTAL CA: CPT | Performed by: PHYSICIAN ASSISTANT

## 2020-07-05 PROCEDURE — 97163 PT EVAL HIGH COMPLEX 45 MIN: CPT

## 2020-07-05 PROCEDURE — 85025 COMPLETE CBC W/AUTO DIFF WBC: CPT | Performed by: PHYSICIAN ASSISTANT

## 2020-07-05 RX ORDER — OXYCODONE HYDROCHLORIDE 5 MG/1
2.5 TABLET ORAL EVERY 4 HOURS PRN
Status: DISCONTINUED | OUTPATIENT
Start: 2020-07-05 | End: 2020-07-09 | Stop reason: HOSPADM

## 2020-07-05 RX ORDER — OXYCODONE HYDROCHLORIDE 5 MG/1
5 TABLET ORAL EVERY 4 HOURS PRN
Status: DISCONTINUED | OUTPATIENT
Start: 2020-07-05 | End: 2020-07-06

## 2020-07-05 RX ADMIN — ACETAMINOPHEN 975 MG: 325 TABLET, FILM COATED ORAL at 21:03

## 2020-07-05 RX ADMIN — HYDROMORPHONE HYDROCHLORIDE 1 MG: 2 TABLET ORAL at 13:22

## 2020-07-05 RX ADMIN — ENOXAPARIN SODIUM 30 MG: 30 INJECTION SUBCUTANEOUS at 21:03

## 2020-07-05 RX ADMIN — FERROUS SULFATE TAB 325 MG (65 MG ELEMENTAL FE) 325 MG: 325 (65 FE) TAB at 08:18

## 2020-07-05 RX ADMIN — ONDANSETRON 4 MG: 2 INJECTION INTRAMUSCULAR; INTRAVENOUS at 08:24

## 2020-07-05 RX ADMIN — LIDOCAINE 1 PATCH: 50 PATCH CUTANEOUS at 08:19

## 2020-07-05 RX ADMIN — ENOXAPARIN SODIUM 30 MG: 30 INJECTION SUBCUTANEOUS at 08:17

## 2020-07-05 RX ADMIN — MELATONIN 5000 UNITS: at 08:17

## 2020-07-05 RX ADMIN — GABAPENTIN 300 MG: 300 CAPSULE ORAL at 21:03

## 2020-07-05 RX ADMIN — ONDANSETRON 4 MG: 2 INJECTION INTRAMUSCULAR; INTRAVENOUS at 21:12

## 2020-07-05 RX ADMIN — LAMOTRIGINE 200 MG: 100 TABLET ORAL at 08:18

## 2020-07-05 RX ADMIN — GABAPENTIN 300 MG: 300 CAPSULE ORAL at 15:45

## 2020-07-05 RX ADMIN — OXYCODONE HYDROCHLORIDE 5 MG: 5 TABLET ORAL at 15:45

## 2020-07-05 RX ADMIN — MEXILETINE HYDROCHLORIDE 150 MG: 150 CAPSULE ORAL at 21:04

## 2020-07-05 RX ADMIN — HYDROMORPHONE HYDROCHLORIDE 1 MG: 2 TABLET ORAL at 03:29

## 2020-07-05 RX ADMIN — DOCUSATE SODIUM 100 MG: 100 CAPSULE, LIQUID FILLED ORAL at 08:18

## 2020-07-05 RX ADMIN — ARIPIPRAZOLE 7.5 MG: 15 TABLET ORAL at 08:19

## 2020-07-05 RX ADMIN — FLUVOXAMINE MALEATE 150 MG: 50 TABLET ORAL at 21:04

## 2020-07-05 RX ADMIN — GABAPENTIN 300 MG: 300 CAPSULE ORAL at 08:18

## 2020-07-05 RX ADMIN — MECLIZINE HYDROCHLORIDE 12.5 MG: 12.5 TABLET, FILM COATED ORAL at 15:45

## 2020-07-05 RX ADMIN — OXYCODONE HYDROCHLORIDE 5 MG: 5 TABLET ORAL at 21:03

## 2020-07-05 RX ADMIN — ACETAMINOPHEN 975 MG: 325 TABLET, FILM COATED ORAL at 05:52

## 2020-07-05 RX ADMIN — MEXILETINE HYDROCHLORIDE 150 MG: 150 CAPSULE ORAL at 08:19

## 2020-07-05 RX ADMIN — DOCUSATE SODIUM 100 MG: 100 CAPSULE, LIQUID FILLED ORAL at 17:30

## 2020-07-05 RX ADMIN — HYDROMORPHONE HYDROCHLORIDE 0.2 MG: 1 INJECTION, SOLUTION INTRAMUSCULAR; INTRAVENOUS; SUBCUTANEOUS at 04:51

## 2020-07-05 RX ADMIN — ACETAMINOPHEN 975 MG: 325 TABLET, FILM COATED ORAL at 13:09

## 2020-07-05 NOTE — PROGRESS NOTES
Subjective: No acute events overnight  No acute distress  Denies numbness or tingling    Objective:  A 10 point ROS was performed; negative except as noted above  Lab Results   Component Value Date/Time    WBC 10 83 (H) 07/05/2020 05:32 AM    WBC 7 66 07/07/2015 04:04 PM    HGB 10 1 (L) 07/05/2020 05:32 AM    HGB 12 3 07/07/2015 04:04 PM       Vitals:    07/05/20 0021   BP: 113/57   Pulse: 88   Resp: 20   Temp: 99 °F (37 2 °C)   SpO2: 95%     Left upper extremity  Splint C/D/I  Mild swelling noted fingers  Able to retropulse thumb, flex thumb IP joint and cross fingers  Sensation intact to light touch in all fingers  Fingers WWP with brisk capillary refill    Assessment: 62 y o  female postop day 1 ORIF left both-bone forearm fracture    Doing well     Plan:  NWB left upper extremity volar slab splint, okay for platform walker  Ice and elevation  Pain control  PT/OT  Patient noted to have acute blood loss anemia due to a drop in Hbg of > 2 0g from preop levels, will monitor vital signs and resuscitate with IV fluids as needed  Dispo: [id number] Nina Crain for discharge from ortho perspective

## 2020-07-05 NOTE — PLAN OF CARE
Problem: OCCUPATIONAL THERAPY ADULT  Goal: Performs self-care activities at highest level of function for planned discharge setting  See evaluation for individualized goals  Description  Treatment Interventions: ADL retraining, Functional transfer training, UE strengthening/ROM, Endurance training, Cognitive reorientation, Patient/family training, Equipment evaluation/education, Neuromuscular reeducation, Compensatory technique education, Continued evaluation, Activityengagement  Equipment Recommended: Tub seat with back(bariatric)       See flowsheet documentation for full assessment, interventions and recommendations  Note:   Limitation: Decreased ADL status, Decreased UE strength, Decreased Safe judgement during ADL, Decreased endurance, Decreased cognition, Decreased self-care trans, Decreased high-level ADLs, Decreased fine motor control  Prognosis: Fair  Assessment: Pt is a R hand dominant 62year old female seen for initial OT evaluation s/p admission to [facility] 7/3/20 s/p fall down 15 stairs with (+) head strike and (-) LOC  Pt with L distal radius and ulna fractures, now POD1 s/p ORIF radius and ulna  Pt is NWB in volar splint, but cleared by ortho to use platform walker  Fall also resulted in multiple rib fractures, pulmonary contusion, T9 osteophyte fracture, scalp hematoma, and hypokalemia  Pt cleared by trauma SIRISHA Win for OT evaluation, OOB mobility and reports no need for spinal bracing/precautions  Pt resides with her  in a [address], however  works and pt is frequently alone  Possible first floor set-up available  Pt reports being I with ADLs, IADLs, and functional mobility without use of DME  Reports she is "supposed to" have her  with her on the stairs, however he was not present during fall    Pt is currently demonstrating the following occupational deficits: eating with Conner, grooming with Conner, UB bathing with modA, LB bathing with mod A, UB dressing with modA, LB dressing with maxA, toileting with Conner, bed mobility with modAx2, functional transfers with modAx2, and functional mobility with modAx2  Factors currently limiting pt's independence in these areas include: NWB L UE, generalized weakness, dizziness, balance, functional mobility, endurance, cognitive deficits, and limited support at home  From an OT standpoint, recommend STR upon d/c  Pt is to continue to benefit from skilled occupational therapy services while in the hospital to maximize functioning and independence with daily activities  See below for OT goals to be addressed during pt's POC       OT Discharge Recommendation: Post-Acute Rehabilitation Services  OT - OK to Discharge: Yes(when medically stable)

## 2020-07-05 NOTE — OCCUPATIONAL THERAPY NOTE
Occupational Therapy Evaluation     Patient Name: Brooklynn Kaminski  [id number] Date: 7/5/2020  Problem List  Principal Problem:    Fall down stairs  Active Problems:    Closed fracture of distal ends of left radius and ulna    Fracture of multiple ribs of left side    Hematoma of parietal scalp    T9 vertebral fracture (HCC)    Bilateral pulmonary contusion    Past Medical History  Past Medical History:   Diagnosis Date    Adrenal insufficiency (Andr[facility]'s disease) ([facility] [address] )     Bipolar disorder     C  difficile diarrhea     Cervical radiculopathy     Chronic back pain     [facility]    Fibromyalgia     History of TIAs     cannot remember details    Hypertension     Hypokalemia     Migraine     MRSA (methicillin resistant Staphylococcus aureus) 07/20/2012    nasal swab negative 4/5/19    Psychiatric disorder     Anxiety, major depression, bipolar    Spinal stenosis     Syncope 2014    orthostatic hypotension     Past Surgical History  Past Surgical History:   Procedure Laterality Date    APPENDECTOMY      GASTRIC RESTRICTION SURGERY      Gastric Sleeve Nov 2015    HYSTERECTOMY      JOINT REPLACEMENT      Left Knee 2011 and Right Hip 2010 07/05/20 1145   Note Type   Note type Eval only   Restrictions/Precautions   Weight Bearing Precautions Per Order Yes   LUE Weight Bearing Per Order NWB  (ok for platform walker per ortho)   Braces or Orthoses   (volar splint L UE)   Other Precautions Cognitive; Chair Alarm; Bed Alarm;WBS;Multiple lines; Fall Risk;Pain   Pain Assessment   Pain Assessment Tool FLACC   Pain Location/Orientation Orientation: Left; Location: Arm   Pain Rating: FLACC (Rest) - Face 0   Pain Rating: FLACC (Rest) - Legs 0   Pain Rating: FLACC (Rest) - Activity 0   Pain Rating: FLACC (Rest) - Cry 0   Pain Rating: FLACC (Rest) - Consolability 0   Score: FLACC (Rest) 0   Pain Rating: FLACC (Activity) - Face 1   Pain Rating: FLACC (Activity) - Legs 0   Pain Rating: FLACC (Activity) - Activity 0   Pain Rating: FLACC (Activity) - Cry 1   Pain Rating: FLACC (Activity) - Consolability 1   Score: FLACC (Activity) 3   Home Living   Type of Home House   Home Layout Two level  (full bathroom on main floor)   Bathroom Shower/Tub Walk-in shower   Bathroom Equipment Grab bars in shower; Shower chair   Additional Comments Pt reports that she resides with her  in a [facility]  Possible first floor set-up available, and pt plans to stay on first floor upon d/c   works FT as a , and pt is home alone for long periods of time   Prior Function   Level of [place] Independent with ADLs and functional mobility   Lives With [facility] [address] in the last 6 months   (reports 3 other falls (others 2* syncope))   Vocational Retired   Lifestyle   Autonomy Pt reports being I with ADLs, IADLs, and functional mobility without use of DME PTA  (however pt's hair on back of hair noted to be matted beyond 2 days of being in the hospital)  Pt reports she is supposed to have close supervision from her  on the stairs   Reciprocal Relationships  works FT and pt is alone during the day   Service to Others retired   Intrinsic Gratification sedentary   Psychosocial   Psychosocial (WDL) WDL   Subjective   Subjective "I've been feeling strange ever since the fall"  Pt reports acute onset of word finding deficits since fall    Reports other falls have been due to syncope, however this one due to "being stupid"      ADL   Eating Assistance 4  Minimal Assistance   Grooming Assistance 4  Minimal Assistance   UB Bathing Assistance 3  Moderate Assistance   LB Bathing Assistance 3  Moderate Assistance   UB Dressing Assistance 3  Moderate Assistance   LB Dressing Assistance 2  Maximal [address]  4  Minimal Assistance   Bed Mobility   Supine to Sit 3  Moderate assistance   Additional items Assist x 2;Increased time required;Verbal cues;LE management   Additional Comments Pt left seated in chair with all needs within reach, chair alarm activated   Transfers   Sit to Stand 3  Moderate assistance   Additional items Assist x 2; Increased time required;Verbal cues   Stand to Sit 3  Moderate assistance   Additional items Assist x 2; Increased time required;Verbal cues   Stand pivot 3  Moderate assistance   Additional items Assist x 2; Increased time required;Verbal cues   Additional Comments Pt completed functional transfers with modAx2 and HHA  Pt reports dizziness that worsens with position changes  BP sitting /71 and 117/72 standing  Pt is only able to tolerate standing ~30 seconds upon first trial 2* dizziness  Functional Mobility   Functional Mobility 3  Moderate assistance   Additional Comments Ax2 to take few small steps from bed to chair  benefits from visual cues for foot placement  No use of DME at this time   Balance   Static Sitting Good   Dynamic Sitting Fair   Static Standing Poor   Dynamic Standing Poor -   Ambulatory Poor -   Activity Tolerance   Activity Tolerance Patient tolerated treatment well   Medical Staff Made Aware PT Sarah Ross, trauma PA   Nurse Made Aware pt cleared by RN for OT evaluation and OOB mobility   RUE Assessment   RUE Assessment   (R hand dominant, strength and AROM WFL)   LUE Assessment   LUE Assessment   (NWB L UE, WNL shoulder elevation and finger mvmt)   Sensation   Light Touch No apparent deficits   Vision-Basic Assessment   Patient Visual Report   (reports no changes in vision)   Vision - Complex Assessment   Ocular Range of Motion WFL   Head Position WDL   Tracking Able to track stimulus in all quads without difficulty   Perception   Inattention/Neglect Appears intact   Cognition   Overall Cognitive Status Impaired   Arousal/Participation Alert; Responsive; Cooperative   Attention Attends with cues to redirect   Orientation Level Oriented X4   Memory Decreased recall of precautions;Decreased recall of recent events;Decreased short term memory   Following Commands Follows one step commands with increased time or repetition   Comments Pt is pleasant, cooperative  Noted to require significantly increased time for processing  Demonstrates word findining deficits that pt report are not baseline  Plan to complete formal cognitive assessment during pt's POC (planned to complete today, however pt currently too dizzy)   Assessment   Limitation Decreased ADL status; Decreased UE strength;Decreased Safe judgement during ADL;Decreased endurance;Decreased cognition;Decreased self-care trans;Decreased high-level ADLs; Decreased fine motor control   Prognosis Fair   Assessment Pt is a R hand dominant 62year old female seen for initial OT evaluation s/p admission to [facility] 7/3/20 s/p fall down 15 stairs with (+) head strike and (-) LOC  Pt with L distal radius and ulna fractures, now POD1 s/p ORIF radius and ulna  Pt is NWB in volar splint, but cleared by ortho to use platform walker  Fall also resulted in multiple rib fractures, pulmonary contusion, T9 osteophyte fracture, scalp hematoma, and hypokalemia  Pt cleared by trauma SIRISHA Win for OT evaluation, OOB mobility and reports no need for spinal bracing/precautions  Pt resides with her  in a [facility], however  works and pt is frequently alone  Possible first floor set-up available  Pt reports being I with ADLs, IADLs, and functional mobility without use of DME  Reports she is "supposed to" have her  with her on the stairs, however he was not present during fall  Pt is currently demonstrating the following occupational deficits: eating with Conner, grooming with Conner, UB bathing with modA, LB bathing with mod A, UB dressing with modA, LB dressing with maxA, toileting with Conner, bed mobility with modAx2, functional transfers with modAx2, and functional mobility with modAx2    Factors currently limiting pt's independence in these areas include: NWB L UE, generalized weakness, dizziness, balance, functional mobility, endurance, cognitive deficits, and limited support at home  From an OT standpoint, recommend STR upon d/c  Pt is to continue to benefit from skilled occupational therapy services while in the hospital to maximize functioning and independence with daily activities  See below for OT goals to be addressed during pt's POC  Goals   Patient Goals to be less dizzy and have normal speech   Plan   Treatment Interventions ADL retraining;Functional transfer training;UE strengthening/ROM; Endurance training;Cognitive reorientation;Patient/family training;Equipment evaluation/education; Neuromuscular reeducation; Compensatory technique education;Continued evaluation; Activityengagement   Goal Expiration Date 07/15/20   OT Treatment Day 1   OT Frequency 3-5x/wk   Recommendation   OT Discharge Recommendation Post-Acute Rehabilitation Services   Equipment Recommended Tub seat with back  (bariatric)   OT - OK to Discharge Yes  (when medically stable)   Barthel Index   Feeding 5   Bathing 0   Grooming Score 0   Dressing Score 0   Bladder Score 5   Bowels Score 10   Toilet Use Score 5   Transfers (Bed/Chair) Score 5   Mobility (Level Surface) Score 0   Stairs Score 0   Barthel Index Score 30     Goals:    Pt will be attentive 100% of the time during ongoing cognitive assessment w/ G participation to assist w/ safe d/c planning/recommendations  Pt will complete all self care tasks w/ supervision w/ use of DME/AD as appropriate while maintaining WBS      Pt will improve functional transfers to Mod I on/off all surfaces using DME as needed w/ G balance/safety     Pt will improve functional mobility during ADL/IADL/leisure tasks to Mod I using DME as needed w/ G balance/safety     Pt will participate in simulated IADL management task to increase independence to Conner w/ G safety and endurance    Pt will demonstrate G carryover of pt/caregiver education and training as appropriate w/o cues w/ good tolerance to increase safety during functional tasks    Pt will maintain standing upright I'ly for at least 10 minutes while completing a dynamic functional activity with good balance and endurance      Wallace Ge, MOT, OTR/L

## 2020-07-05 NOTE — ASSESSMENT & PLAN NOTE
- orthopedics consulted, appreciate input  - neurovascular checks  - 7/4 to OR for ORIF of her left forearm radius and ulnar shaft fractures   - pain control, PT OT  - Patient does have high utilizer plan in place for apparent drug-seeking behavior  - started back on Lovenox  - orthopedics signed off

## 2020-07-05 NOTE — PROGRESS NOTES
Progress Note - Javier Carcamo 1960, 62 y o  female MRN: [id number]    Unit/Bed#: [facility] 628-01 Encounter: [id number]    Primary Care Provider: Kathleen Arrington DO   Date and time admitted to hospital: 7/3/2020  8:01 PM        Bilateral pulmonary contusion  Assessment & Plan  - repeat chest x-ray was stable  - patient is off oxygen  - out of bed to chair    T9 vertebral fracture (HCC)  Assessment & Plan  - Acute minimally displaced right lateral osteophyte fracture of the T9 vertebral body  - neurologically intact  - patient has no midline T spine tenderness  - pain control, PT OT    Hematoma of parietal scalp  Assessment & Plan  - 7/3 CT H no acute intracranial abnormality, no calvarial fracture    Fracture of multiple ribs of left side  Assessment & Plan  - Minimally displaced right anterior 4th, 5th, 6th rib fractures without pneumothorax  - Peripheral ground-glass opacities in bilateral upper lobes, possibly from contusions versus infectious versus other inflammatory process  - Rib fracture protocol  - follow-up chest x-ray 7/4: no acute cardiopulmonary disease, acute right anterior rib fractures on CT are not visible, no PTX    Closed fracture of distal ends of left radius and ulna  Assessment & Plan  - orthopedics consulted, appreciate input  - neurovascular checks  - 7/4 to OR for ORIF of her left forearm radius and ulnar shaft fractures   - pain control, PT OT  - Patient does have high utilizer plan in place for apparent drug-seeking behavior  - started back on Lovenox  - orthopedics signed off    * Fall down stairs  Assessment & Plan  - As below including pt/ot    DVT Prophylaxis: SCDs and Lovenox  PT and OT: eval and treat    Disposition:  DC to rehab  Continue pain control  SUBJECTIVE:  Chief Complaint: "No new complaints "    Subjective:  Patient is without new complaints today  Reports she is excited to get out of bed  Tolerating a diet        OBJECTIVE:     Meds/Allergies     Current Facility-Administered Medications:     acetaminophen (TYLENOL) tablet 975 mg, 975 mg, Oral, Q8H [facility], Avis Stafford MD, 975 mg at 07/05/20 1309    ARIPiprazole (ABILIFY) tablet 7 5 mg, 7 5 mg, Oral, Daily, Avis Stafford MD, 7 5 mg at 07/05/20 0819    cholecalciferol (VITAMIN D3) tablet 5,000 Units, 5,000 Units, Oral, Daily, Avis Stafford MD, 5,000 Units at 07/05/20 0817    docusate sodium (COLACE) capsule 100 mg, 100 mg, Oral, BID, Avis Stafford MD, 100 mg at 07/05/20 0818    enoxaparin (LOVENOX) subcutaneous injection 30 mg, 30 mg, Subcutaneous, Q12H [facility], Ness Pacheco PA-C, 30 mg at 07/05/20 6415    ferrous sulfate tablet 325 mg, 325 mg, Oral, Daily With Breakfast, Avis Stafford MD, 325 mg at 07/05/20 0818    fluvoxaMINE (LUVOX) tablet 150 mg, 150 mg, Oral, HS, Avis Stafford MD, 150 mg at 07/04/20 2209    gabapentin (NEURONTIN) capsule 300 mg, 300 mg, Oral, TID, Avis Stafford MD, 300 mg at 07/05/20 0818    lamoTRIgine (LaMICtal) tablet 200 mg, 200 mg, Oral, Daily, Avis Stafford MD, 200 mg at 07/05/20 0818    lidocaine (LIDODERM) 5 % patch 1 patch, 1 patch, Topical, Daily, Avis Stafford MD, 1 patch at 07/05/20 0819    meclizine (ANTIVERT) tablet 12 5 mg, 12 5 mg, Oral, Q8H PRN, Avis Stafford MD    mexiletine (MEXITIL) capsule 150 mg, 150 mg, Oral, TID, Avis Stafford MD, 150 mg at 07/05/20 0819    naloxone (NARCAN) 0 04 mg/mL syringe 0 04 mg, 0 04 mg, Intravenous, Q1MIN PRN, Avis Stafford MD    ondansetron [facility]) injection 4 mg, 4 mg, Intravenous, Q6H PRN, Avis Stafford MD, 4 mg at 07/05/20 0824    oxyCODONE (ROXICODONE) IR tablet 2 5 mg, 2 5 mg, Oral, Q4H PRN **OR** oxyCODONE (ROXICODONE) IR tablet 5 mg, 5 mg, Oral, Q4H PRN, Ness Pacheco PA-C     Vitals:   Vitals:    07/05/20 1138   BP: 117/72   Pulse: 86   Resp:    Temp:    SpO2: 91%       Intake/Output:  I/O       07/03 0701 - 07/04 0700 07/04 0701 - 07/05 0700 07/05 0701 - 07/06 0700 P O   240     I V   1716 7     IV Piggyback  250     Total Intake  2206 7     Urine 387 330 650    Total Output 387 330 650    Net -387 +1876 7 -650           Unmeasured Urine Occurrence  2 x            Nutrition/GI Proph/Bowel Reg:  Continue current diet    Physical Exam:   GENERAL APPEARANCE:  No acute distress  NEURO:  GCS 15  HEENT:  Normocephalic  CV:  Regular rate and rhythm  LUNGS:  CTA bilaterally  GI:  Nontender, nondistended  :  No Crowder  MSK:  +2 pulses on extremities; left upper extremity in a sling  SKIN:  Warm, dry, intact    Invasive Devices     Peripheral Intravenous Line            Peripheral IV 07/03/20 Right Antecubital 1 day    Peripheral IV 07/04/20 Right Forearm 1 day          Drain            External Urinary Catheter less than 1 day                 Lab Results:   Results: I have personally reviewed pertinent reports   , BMP/CMP:   Lab Results   Component Value Date    SODIUM 142 07/05/2020    K 3 7 07/05/2020     07/05/2020    CO2 32 07/05/2020    BUN 11 07/05/2020    CREATININE 0 63 07/05/2020    CALCIUM 8 1 (L) 07/05/2020    EGFR 97 07/05/2020    and CBC:   Lab Results   Component Value Date    WBC 10 83 (H) 07/05/2020    HGB 10 1 (L) 07/05/2020    HCT 31 3 (L) 07/05/2020    MCV 99 (H) 07/05/2020     07/05/2020    MCH 31 9 07/05/2020    MCHC 32 3 07/05/2020    RDW 14 7 07/05/2020    MPV 10 7 07/05/2020    NRBC 0 07/05/2020     Imaging/EKG Studies: Results: I have personally reviewed pertinent reports      Other Studies: no other studies  VTE Prophylaxis: SCDs and Lovenox

## 2020-07-05 NOTE — PHYSICAL THERAPY NOTE
Physical Therapy Evaluation    Patient Name: Amelia Meigs    TMZQT'L Date: 7/5/2020     Problem List  Principal Problem:    Fall down stairs  Active Problems:    Closed fracture of distal ends of left radius and ulna    Fracture of multiple ribs of left side    Hematoma of parietal scalp    T9 vertebral fracture (HCC)    Bilateral pulmonary contusion       Past Medical History  Past Medical History:   Diagnosis Date    Adrenal insufficiency ([place]'s disease) ([facility] )     Bipolar disorder     C  difficile diarrhea     Cervical radiculopathy     Chronic back pain     [facility]    Fibromyalgia     History of TIAs     cannot remember details    Hypertension     Hypokalemia     Migraine     MRSA (methicillin resistant Staphylococcus aureus) 07/20/2012    nasal swab negative 4/5/19    Psychiatric disorder     Anxiety, major depression, bipolar    Spinal stenosis     Syncope 2014    orthostatic hypotension        Past Surgical History  Past Surgical History:   Procedure Laterality Date    APPENDECTOMY      GASTRIC RESTRICTION SURGERY      Gastric Sleeve Nov 2015    HYSTERECTOMY      JOINT REPLACEMENT      Left Knee 2011 and Right Hip 2010 07/05/20 1140   Note Type   Note type Eval only   Pain Assessment   Pain Assessment Tool FLACC   Pain Rating: FLACC (Rest) - Face 0   Pain Rating: FLACC (Rest) - Legs 0   Pain Rating: FLACC (Rest) - Activity 0   Pain Rating: FLACC (Rest) - Cry 0   Pain Rating: FLACC (Rest) - Consolability 0   Score: FLACC (Rest) 0   Pain Rating: FLACC (Activity) - Face 1   Pain Rating: FLACC (Activity) - Legs 0   Pain Rating: FLACC (Activity) - Activity 0   Pain Rating: FLACC (Activity) - Cry 1   Pain Rating: FLACC (Activity) - Consolability 1   Score: FLACC (Activity) 3   Home Living   Type of Home House   Home Layout Two level  ([address] with 3 [address]CCA)   Bathroom Shower/Tub Walk-in shower   Bathroom Equipment Grab bars in shower; Shower chair   Home Equipment Cane;Walker; Other (Comment)  (Reports no use of DME at baseline)   Additional Comments Pt resides with spouse in a [facility] with 3 BECCA and 2nd floor bedroom  Reports she plans to stay on the first floor now   works during the day  Prior Function   Level of [place] Independent with ADLs and functional mobility   Lives With Spouse   ADL Assistance Independent   IADLs Independent   Falls in the last 6 months   (at least 3 falls)   Vocational Full time employment   Restrictions/Precautions   Weight Bearing Precautions Per Order Yes   LUE Weight Bearing Per Order NWB  (Ok to use platform walker)   Braces or Orthoses   (volar splint LUE)   Other Precautions Cognitive; Bed Alarm; Chair Alarm; Fall Risk   Cognition   Overall Cognitive Status Impaired   Attention Attends with cues to redirect   Orientation Level Oriented X4   Memory Decreased recall of precautions;Decreased recall of recent events   Following Commands Follows one step commands with increased time or repetition   Comments Pleasant - required cues to maintain NWB status t/o session  Shows decreased safety awareness and limited insight into deficits   RLE Assessment   RLE Assessment   (At least 3+/5 based on functional mobility)   LLE Assessment   LLE Assessment   (At least 3+/5 based on functional mobility)   Bed Mobility   Supine to Sit 3  Moderate assistance   Additional items Assist x 2; Increased time required;Verbal cues;LE management   Additional Comments Cues to maintain LUE NWB status - attempted to push up LUE  Concluded session with patient seated OOB with chair alarm on    Transfers   Sit to Stand 3  Moderate assistance   Additional items Assist x 2; Increased time required;Verbal cues   Stand to Sit 3  Moderate assistance   Additional items Assist x 2; Increased time required;Verbal cues   Additional Comments Reported dizziness with positional changes- Seated EOB: 117/72, Standing tolerance: 120/71  Only tolerated standing for approx ~30 seconds before requesting to sit down 2* dizziness  Able to perform a 2nd trial with 2 lateral side steps as dizziness subsided slightly  Ambulation/Elevation   Gait pattern Short stride;Redundant gait at times   Gait Assistance 3  Moderate assist   Additional items Assist x 2;Verbal cues   Assistive Device Other (Comment)  (HHA  x 2; plan to trial Platform walker next session)   Distance 2 lateral side steps   Balance   Static Sitting Good   Dynamic Sitting Fair   Static Standing Fair -   Dynamic Standing Poor   Ambulatory Poor   Endurance Deficit   Endurance Deficit Yes   Endurance Deficit Description Dizziness   Activity Tolerance   Activity Tolerance Patient limited by fatigue;Treatment limited secondary to medical complications (Comment)  (dizziness)   Medical Staff Made Aware  [address] by RN for PT session   Assessment   Prognosis Good   Problem List Decreased strength;Decreased range of motion; Impaired balance;Decreased endurance;Decreased mobility; Decreased cognition; Impaired judgement;Decreased safety awareness;Orthopedic restrictions   Assessment Pt is 62 y o  female seen for high complexity PT evaluation s/p admission to B on 7/3/20 s/p fall down 15 steps with head strike and LOC  Sustained L distal/ulnar fx now POD # 1 ORIF  Also sustained acute minimal displaced right lateral osteophyte fx of T9 vertebral body and multiple left rib fx  Cleared by Dixon Dubois PA-C for pt evaluation and OOB mobility  Pt with active PT eval/treat orders, NWB on LUE  Comorbidities affecting pt's physical performance at time of assessment include: bilateral pulmonary contusion and hematoma of parietal scalp  PTA, pt was independent with ADLs, IADLs, and functional mobility without use of DME  Pt resides with her spouse in 2  with 3 BECCA and [facility]SU may be available  Reports she is home alone during the day   Functional mobility limited by dizziness  BP stable despite dizziness  Required mod A x 2 for bed mobility, transfers, and ambulation - only tolerated standing for approx ~30 seconds and 2 side steps  Patient's decreased mobility level and increased fall risk is secondary to deficits in activity tolerance, endurance, standing tolerance, standing/ambulatory balance, generalized weakness, deconditioning, gait, and NWB status  Current clinical presentation is unstable/unpredictable seen in pt's presentation of ongoing medical management, increased reliance on assistance compared to PLOF, impaired judgement/safety awareness, and significant PMH  Pt to benefit from continued PT tx to address deficits as defined above and maximize level of functional independent mobility and consistency  From PT/mobility standpoint, recommendation at time of d/c would be STR pending progress in order to facilitate return to PLOF  Barriers to Discharge Decreased caregiver support   Goals   Patient Goals To be less dizzy   STG Expiration Date 07/15/20   Short Term Goal #1 In 1-2 weeks, the patient will complete the following 1) Perform all aspect of bed mobility independently 2) Perform functional transfer with LRAD independently  3) Ambulate >150 feet with LRAD at mod I level  4) Negotiate 12 steps with 1 handrail and supervision 5) Patient will improve dynamic standing balance from poor + to fair + in order to perform everyday activities  6) Patient will continue with ongoing rehab services for family education, DME, and D/C planning '   Plan   Treatment/Interventions Functional transfer training; Therapeutic exercise;Elevations;LE strengthening/ROM; Endurance training;Cognitive reorientation;Patient/family training;Bed mobility;Gait training   PT Frequency   (3-6x/wk)   Recommendation   PT Discharge Recommendation Post-Acute Rehabilitation Services   Equipment Recommended   (Platform walker )   PT - OK to Discharge Yes   Additional Comments to STR only once medically stable       Governor Anna Marie IRELAND, DPT

## 2020-07-05 NOTE — PLAN OF CARE
Problem: Potential for Falls  Goal: Patient will remain free of falls  Description  INTERVENTIONS:  - Assess patient frequently for physical needs  -  Identify cognitive and physical deficits and behaviors that affect risk of falls    -  [place] fall precautions as indicated by assessment   - Educate patient/family on patient safety including physical limitations  - Instruct patient to call for assistance with activity based on assessment  - Modify environment to reduce risk of injury  - Consider OT/PT consult to assist with strengthening/mobility  Outcome: Progressing     Problem: PAIN - ADULT  Goal: Verbalizes/displays adequate comfort level or baseline comfort level  Description  Interventions:  - Encourage patient to monitor pain and request assistance  - Assess pain using appropriate pain scale  - Administer analgesics based on type and severity of pain and evaluate response  - Implement non-pharmacological measures as appropriate and evaluate response  - Consider cultural and social influences on pain and pain management  - Notify physician/advanced practitioner if interventions unsuccessful or patient reports new pain  Outcome: Progressing     Problem: INFECTION - ADULT  Goal: Absence or prevention of progression during hospitalization  Description  INTERVENTIONS:  - Assess and monitor for signs and symptoms of infection  - Monitor lab/diagnostic results  - Monitor all insertion sites, i e  indwelling lines, tubes, and drains  - Monitor endotracheal if appropriate and nasal secretions for changes in amount and color  - [place] appropriate cooling/warming therapies per order  - Administer medications as ordered  - Instruct and encourage patient and family to use good hand hygiene technique  - Identify and instruct in appropriate isolation precautions for identified infection/condition  Outcome: Progressing     Problem: SAFETY ADULT  Goal: Patient will remain free of falls  Description  INTERVENTIONS:  - Assess patient frequently for physical needs  -  Identify cognitive and physical deficits and behaviors that affect risk of falls    -  [place] fall precautions as indicated by assessment   - Educate patient/family on patient safety including physical limitations  - Instruct patient to call for assistance with activity based on assessment  - Modify environment to reduce risk of injury  - Consider OT/PT consult to assist with strengthening/mobility  Outcome: Progressing  Goal: Maintain or return to baseline ADL function  Description  INTERVENTIONS:  -  Assess patient's ability to carry out ADLs; assess patient's baseline for ADL function and identify physical deficits which impact ability to perform ADLs (bathing, care of mouth/teeth, toileting, grooming, dressing, etc )  - Assess/evaluate cause of self-care deficits   - Assess range of motion  - Assess patient's mobility; develop plan if impaired  - Assess patient's need for assistive devices and provide as appropriate  - Encourage maximum independence but intervene and supervise when necessary  - Involve family in performance of ADLs  - Assess for home care needs following discharge   - Consider OT consult to assist with ADL evaluation and planning for discharge  - Provide patient education as appropriate  Outcome: Progressing  Goal: Maintain or return mobility status to optimal level  Description  INTERVENTIONS:  - Assess patient's baseline mobility status (ambulation, transfers, stairs, etc )    - Identify cognitive and physical deficits and behaviors that affect mobility  - Identify mobility aids required to assist with transfers and/or ambulation (gait belt, sit-to-stand, lift, walker, cane, etc )  - [place] fall precautions as indicated by assessment  - Record patient progress and toleration of activity level on Mobility SBAR; progress patient to next Phase/Stage  - Instruct patient to call for assistance with activity based on assessment  - Consider rehabilitation consult to assist with strengthening/weightbearing, etc   Outcome: Progressing     Problem: DISCHARGE PLANNING  Goal: Discharge to home or other facility with appropriate resources  Description  INTERVENTIONS:  - Identify barriers to discharge w/patient and caregiver  - Arrange for needed discharge resources and transportation as appropriate  - Identify discharge learning needs (meds, wound care, etc )  - Arrange for interpretive services to assist at discharge as needed  - Refer to Case Management Department for coordinating discharge planning if the patient needs post-hospital services based on physician/advanced practitioner order or complex needs related to functional status, cognitive ability, or social support system  Outcome: Progressing     Problem: Knowledge Deficit  Goal: Patient/family/caregiver demonstrates understanding of disease process, treatment plan, medications, and discharge instructions  Description  Complete learning assessment and assess knowledge base    Interventions:  - Provide teaching at level of understanding  - Provide teaching via preferred learning methods  Outcome: Progressing     Problem: Prexisting or High Potential for Compromised Skin Integrity  Goal: Skin integrity is maintained or improved  Description  INTERVENTIONS:  - Identify patients at risk for skin breakdown  - Assess and monitor skin integrity  - Assess and monitor nutrition and hydration status  - Monitor labs   - Assess for incontinence   - Turn and reposition patient  - Assist with mobility/ambulation  - Relieve pressure over bony prominences  - Avoid friction and shearing  - Provide appropriate hygiene as needed including keeping skin clean and dry  - Evaluate need for skin moisturizer/barrier cream  - Collaborate with interdisciplinary team   - Patient/family teaching  - Consider wound care consult   Outcome: Progressing

## 2020-07-05 NOTE — UTILIZATION REVIEW
Initial Clinical Review    Admission: Date/Time/Statement: Admission Orders (From admission, onward)     Ordered        07/03/20 2059  Inpatient Admission  Once                   Orders Placed This Encounter   Procedures    Inpatient Admission     Standing Status:   Standing     Number of Occurrences:   1     Order Specific Question:   Admitting Physician     Answer:   Lavern Bullock [[id number]]     Order Specific Question:   Level of Care     Answer:   Med Surg [[id number]]     Order Specific Question:   Estimated length of stay     Answer:   More than 2 Midnights     Order Specific Question:   Certification     Answer:   I certify that inpatient services are medically necessary for this patient for a duration of greater than two midnights  See H&P and MD Progress Notes for additional information about the patient's course of treatment  ED Arrival Information     Expected Arrival Acuity Means of Arrival Escorted By Service Admission Type    7/3/2020 18:43 7/3/2020 20:01 Immediate Ambulance [facility] Jalaine Hodgkins) Trauma Urgent    Arrival Complaint    mult fx's/  pulm contusion        Chief Complaint   Patient presents with    Fall     Pt is a trauma transfer from [facility], pt had fall down stairs, +headstrike, +thinners; pt has L arm pain, pulmonary contusions, rib fxs     Assessment/Plan: 62 y o  female with h/o DVT /PE on Eliquis who [facility] ED as a trauma transfer from [place] [address] ED where she presented s/p fall down ~15 steps and w/u revealed 3 right rib fxs as well as a left distal radius/ulna shaft fracture  Per EMS, the patient was found by her   She had recently been admitted to the hospital for syncope and hypokalemia  Denies loc but does believe she hit her head  Currently she c/o diffuse pain throughout her body  L distal/ulna fx reduced and splinted in ED    Admit INPATIENT to M/S unit under trauma service with L distal shaft radius/ulna fractures with significant angulation  Midline high parietal scalp hematoma  Acute R anterior 4th, 5th, 6th rib fxs  Acute minimally displaced R lateral osteophyte fx of T9 vertebral body  Peripheral ground-glass opacities in b/l upper lobes, possible pulmonary contusions  Hypokalemia   Plan: ortho consulted with plan to go to OR for ORIF distal radius/ulnar fx's  Incentive spirometry q1h, cough and deep breath  Analgesics  Hold AC for OR tomorrow       OPERATIVE REPORT  SURGERY DATE: 07/04/20  Procedure(s):  Open reduction and internal fixation left radius and ulnar shaft fracture  Application short-arm splint  Anesthesia Type:   General  Operative Findings:  Transverse fracture of the ulnar shaft and transverse fracture with small butterfly fragment of the radial shaft on the left    7/4 --- post-op: GCS 15  Sling in place to LLE, elevate LUE  Ice prn  NV checks q4h  Continue analgesics prn  Reg diet  Hep sq/SCD's for DVT ppx   PT/OT evals    Vital Signs:   Time  Temp  Pulse  Resp  BP  MAP (mmHg)  SpO2  O2 Flow Rate (L/min)  O2 Device   07/04/20 1957              2 L/min  None (Room air)   07/04/20 18:54:21    97    138/78  98  92 %       07/04/20 17:56:01  100 1 °F (37 8 °C)  95  20  147/79  102  93 %       07/04/20 1705              2 L/min  Nasal cannula   07/04/20 16:52:17  99 6 °F (37 6 °C)  87    145/79  101  94 %       07/04/20 1626  98 °F (36 7 °C)  84  16  117/51    97 %  2 L/min  Nasal cannula   07/04/20 1615  98 °F (36 7 °C)  86  20  117/51    92 %  2 L/min  Nasal cannula   07/04/20 1600    84  19  104/50    98 %    None (Room air)   07/04/20 1551  97 1 °F (36 2 °C)Abnormal   85    99/46Abnormal     97 %  6 L/min  Simple mask   07/04/20 06:58:40  98 5 °F (36 9 °C)  75  16  131/77  95  97 %       07/03/20 21:44:49  100 1 °F (37 8 °C)  85  20  124/63  83  96 %       07/03/20 2005  100 7 °F (38 2 °C)Abnormal   89  18  136/68    96 %           Pertinent Labs/Diagnostic Test Results:   XR L forearm / L wrist 7/4 -- Transversely oriented displaced and overriding fractures of the distal radial and ulnar shafts  CT c/a/p 7/4 --   1   Acute minimally displaced right anterior 4th, 5th, and 6th rib fractures   No pneumothorax  2   Acute minimally displaced right lateral osteophyte fracture of the T9 vertebral body  3   Peripheral groundglass opacities in the bilateral upper lobes, nonspecific, possibly pulmonary contusions and/or infectious/inflammatory  4   No acute traumatic injury in the abdomen or pelvis   No acute osseous abnormality in the lumbar spine  5   Incidentally imaged acute displaced and angulated left radial and ulnar distal shaft fractures    CT brain 7/3 --1   No acute intracranial abnormality  2   Midline high parietal scalp hematoma without a calvarial fracture      Results from last 7 days   Lab Units 07/03/20  1845   SARS-COV-2  Negative     Results from last 7 days   Lab Units 07/04/20  1249 07/04/20  0509 07/03/20  1653   WBC Thousand/uL  --  9 57 13 51*   HEMOGLOBIN g/dL  --  10 8* 11 8   I STAT HEMOGLOBIN g/dl 11 6  --   --    HEMATOCRIT %  --  32 9* 35 9   HEMATOCRIT, ISTAT % 34*  --   --    PLATELETS Thousands/uL  --  211 234   NEUTROS ABS Thousands/µL  --   --  10 67*     Results from last 7 days   Lab Units 07/04/20  1249 07/04/20  0509 07/03/20  1653 06/30/20  0507 06/29/20  0058   SODIUM mmol/L  --  142 141 143 147*   POTASSIUM mmol/L  --  2 5* 2 5* 4 0 3 5   CHLORIDE mmol/L  --  104 102 106 112*   CO2 mmol/L  --  33* 33* 29 28   CO2, I-STAT mmol/L 36*  --   --   --   --    ANION GAP mmol/L  --  5 6 8 7   BUN mg/dL  --  17 24 12 14   CREATININE mg/dL  --  0 82 1 29 0 87 1 08   EGFR ml/min/1 73sq m  --  77 45 72 56   CALCIUM mg/dL  --  7 8* 8 1* 8 8 8 4   CALCIUM, IONIZED, ISTAT mmol/L 1 17  --   --   --   --      Results from last 7 days   Lab Units 06/29/20  0058   AST U/L 34   ALT U/L 36   ALK PHOS U/L 126*   TOTAL PROTEIN g/dL 5 6*   ALBUMIN g/dL 2 3*   TOTAL BILIRUBIN mg/dL 0 15*     Results from last 7 days   Lab Units 07/04/20  0509 07/03/20  1653 06/30/20  0507 06/29/20  0058   GLUCOSE RANDOM mg/dL 89 100 146* 108     Results from last 7 days   Lab Units 07/04/20  1249   PH, LAST I-STAT  7 383   PCO2, LAST ISTAT mm HG 57 2*   PO2, LAST ISTAT mm HG 42 0   HCO3, LAST ISTAT mmol/L 34 0*   I STAT BASE EXC mmol/L 7*   I STAT O2 SAT % 76     Results from last 7 days   Lab Units 07/04/20  0509   PROTIME seconds 14 7*   INR  1 15   PTT seconds 28           Past Medical History:   Diagnosis Date    Adrenal insufficiency (Gooding's disease) ([facility] [address] )     Bipolar disorder     C  difficile diarrhea     Cervical radiculopathy     Chronic back pain     DVT, lower extremity ([facility] ) 1991    Fibromyalgia     History of TIAs     cannot remember details    Hypertension     Hypokalemia     Migraine     MRSA (methicillin resistant Staphylococcus aureus) 07/20/2012    nasal swab negative 4/5/19    Psychiatric disorder     Anxiety, major depression, bipolar    Spinal stenosis     Syncope 2014    orthostatic hypotension     Present on Admission:   Closed fracture of distal ends of left radius and ulna   Fall down stairs      Admitting Diagnosis: Closed fracture of distal ends of left radius and ulna, initial encounter [S52 502A, S52 602A]  Age/Sex: 62 y o  female  Admission Orders:  Scheduled Medications:    Medications:  acetaminophen 975 mg Oral Q8H [address]   ARIPiprazole 7 5 mg Oral Daily   cholecalciferol 5,000 Units Oral Daily   docusate sodium 100 mg Oral BID   enoxaparin 30 mg Subcutaneous Q12H [address]   ferrous sulfate 325 mg Oral Daily With Breakfast   fluvoxaMINE 150 mg Oral HS   gabapentin 300 mg Oral TID   lamoTRIgine 200 mg Oral Daily   lidocaine 1 patch Topical Daily   mexiletine 150 mg Oral TID        PRN Meds:  HYDROmorphone 0 2 mg Intravenous Q4H PRN 7/4 x2   HYDROmorphone 1 mg Oral Q6H PRN 7/4 x1   meclizine 12 5 mg Oral Q8H PRN   naloxone 0 04 mg Intravenous Q1MIN PRN ondansetron 4 mg Intravenous Q6H PRN   Oxycodone 2 5 mg x1  7/4 x1    IP CONSULT TO ORTHOPEDIC SURGERY  IP CONSULT TO CASE MANAGEMENT  IP CONSULT TO ACUTE PAIN SERVICE    Network Utilization Review Department  [email or web address]  ATTENTION: Please call with any questions or concerns to [phone] and carefully listen to the prompts so that you are directed to the right person  All voicemails are confidential   Frank Lawton all requests for admission clinical reviews, approved or denied determinations and any other requests to dedicated fax number below belonging to the campus where the patient is receiving treatment   List of dedicated fax numbers for the Facilities:  [address] DENIALS (Administrative/Medical Necessity) [phone]   [address] (Maternity/NICU/Pediatrics) [phone]   Conda Sas [phone]   Jamas Piggs [phone]   Isabelle Dress [phone]   Levell Pleasure [phone]   [address] [place] [address] [address] [place] [address] [phone]   [facility]  [phone]   [address] [place] [address], [address]  [address] [facility] [address] [facility] [phone]

## 2020-07-05 NOTE — ASSESSMENT & PLAN NOTE
- Minimally displaced right anterior 4th, 5th, 6th rib fractures without pneumothorax  - Peripheral ground-glass opacities in bilateral upper lobes, possibly from contusions versus infectious versus other inflammatory process  - Rib fracture protocol  - follow-up chest x-ray 7/4: no acute cardiopulmonary disease, acute right anterior rib fractures on CT are not visible, no PTX

## 2020-07-05 NOTE — SOCIAL WORK
PT IS A 30 DAYS READMISSION, NOT A BUNDLE  Pt reported her readmission was due to a fall down her stairs  CM met with Pt with an introduction and explanation of role  Pt reported residing with spouse Adriel Chaudhary in a 2 story home with no use of DME but has access to a cane and roller walker if needed, and 3 steps to enter the home  Pt reported being independent with ADLs, had VNA and [facility] the [facility] for [facility]  Pt reported receiving treatment for her Bipolar disorder at [facility]  Pt reported having a living will, uses [facility] on [address] and has Christina Ramirez as a PCP  CM reviewed d/c planning process including the following: identifying help at home, patient preference for d/c planning needs, Discharge Lounge, [facility] Meds to Bed program, availability of treatment team to discuss questions or concerns patient and/or family may have regarding understanding medications and recognizing signs and symptoms once discharged  CM also encouraged patient to follow up with all recommended appointments after discharge  Patient advised of importance for patient and family to participate in managing patients medical well being

## 2020-07-05 NOTE — PLAN OF CARE
Problem: PHYSICAL THERAPY ADULT  Goal: Performs mobility at highest level of function for planned discharge setting  See evaluation for individualized goals  Description  Treatment/Interventions: Functional transfer training, Therapeutic exercise, Elevations, LE strengthening/ROM, Endurance training, Cognitive reorientation, Patient/family training, Bed mobility, Gait training  Equipment Recommended: (Platform walker )       See flowsheet documentation for full assessment, interventions and recommendations  Note:   Prognosis: Good  Problem List: Decreased strength, Decreased range of motion, Impaired balance, Decreased endurance, Decreased mobility, Decreased cognition, Impaired judgement, Decreased safety awareness, Orthopedic restrictions  Assessment: Pt is 62 y o  female seen for high complexity PT evaluation s/p admission to [facility] on 7/3/20 s/p fall down 15 steps with head strike and LOC  Sustained L distal/ulnar fx now POD # 1 ORIF  Also sustained acute minimal displaced right lateral osteophyte fx of T9 vertebral body and multiple left rib fx  Cleared by Kuldip Adame PA-C for pt evaluation and OOB mobility  Pt with active PT eval/treat orders, NWB on LUE  Comorbidities affecting pt's physical performance at time of assessment include: bilateral pulmonary contusion and hematoma of parietal scalp  PTA, pt was independent with ADLs, IADLs, and functional mobility without use of DME  Pt resides with her spouse in 2  with 3 [facility] and [facility] may be available  Reports she is home alone during the day  Functional mobility limited by dizziness  BP stable despite dizziness  Required mod A x 2 for bed mobility, transfers, and ambulation - only tolerated standing for approx ~30 seconds and 2 side steps   Patient's decreased mobility level and increased fall risk is secondary to deficits in activity tolerance, endurance, standing tolerance, standing/ambulatory balance, generalized weakness, deconditioning, gait, and NWB status  Current clinical presentation is unstable/unpredictable seen in pt's presentation of ongoing medical management, increased reliance on assistance compared to PLOF, impaired judgement/safety awareness, and significant PMH  Pt to benefit from continued PT tx to address deficits as defined above and maximize level of functional independent mobility and consistency  From PT/mobility standpoint, recommendation at time of d/c would be STR pending progress in order to facilitate return to PLOF  Barriers to Discharge: Decreased caregiver support     PT Discharge Recommendation: Post-Acute Rehabilitation Services     PT - OK to Discharge: Yes    See flowsheet documentation for full assessment

## 2020-07-05 NOTE — ASSESSMENT & PLAN NOTE
- Acute minimally displaced right lateral osteophyte fracture of the T9 vertebral body  - neurologically intact  - patient has no midline T spine tenderness  - pain control, PT OT

## 2020-07-05 NOTE — OCCUPATIONAL THERAPY NOTE
Occupational Therapy Cancellation Note  OT orders received, pt's chart reviewed  Per trauma PAChristieC, pt is pending neurosurgery consult for T9 fx   OT to continue to follow and attempt initial evaluation as appropriate      ADAM Ha, OTR/L

## 2020-07-05 NOTE — PLAN OF CARE
Problem: Potential for Falls  Goal: Patient will remain free of falls  Description  INTERVENTIONS:  - Assess patient frequently for physical needs  -  Identify cognitive and physical deficits and behaviors that affect risk of falls    -  [place] fall precautions as indicated by assessment   - Educate patient/family on patient safety including physical limitations  - Instruct patient to call for assistance with activity based on assessment  - Modify environment to reduce risk of injury  - Consider OT/PT consult to assist with strengthening/mobility  Outcome: Progressing     Problem: PAIN - ADULT  Goal: Verbalizes/displays adequate comfort level or baseline comfort level  Description  Interventions:  - Encourage patient to monitor pain and request assistance  - Assess pain using appropriate pain scale  - Administer analgesics based on type and severity of pain and evaluate response  - Implement non-pharmacological measures as appropriate and evaluate response  - Consider cultural and social influences on pain and pain management  - Notify physician/advanced practitioner if interventions unsuccessful or patient reports new pain  Outcome: Progressing     Problem: INFECTION - ADULT  Goal: Absence or prevention of progression during hospitalization  Description  INTERVENTIONS:  - Assess and monitor for signs and symptoms of infection  - Monitor lab/diagnostic results  - Monitor all insertion sites, i e  indwelling lines, tubes, and drains  - Monitor endotracheal if appropriate and nasal secretions for changes in amount and color  - [place] appropriate cooling/warming therapies per order  - Administer medications as ordered  - Instruct and encourage patient and family to use good hand hygiene technique  - Identify and instruct in appropriate isolation precautions for identified infection/condition  Outcome: Progressing     Problem: SAFETY ADULT  Goal: Patient will remain free of falls  Description  INTERVENTIONS:  - Assess patient frequently for physical needs  -  Identify cognitive and physical deficits and behaviors that affect risk of falls    -  [place] fall precautions as indicated by assessment   - Educate patient/family on patient safety including physical limitations  - Instruct patient to call for assistance with activity based on assessment  - Modify environment to reduce risk of injury  - Consider OT/PT consult to assist with strengthening/mobility  Outcome: Progressing  Goal: Maintain or return to baseline ADL function  Description  INTERVENTIONS:  -  Assess patient's ability to carry out ADLs; assess patient's baseline for ADL function and identify physical deficits which impact ability to perform ADLs (bathing, care of mouth/teeth, toileting, grooming, dressing, etc )  - Assess/evaluate cause of self-care deficits   - Assess range of motion  - Assess patient's mobility; develop plan if impaired  - Assess patient's need for assistive devices and provide as appropriate  - Encourage maximum independence but intervene and supervise when necessary  - Involve family in performance of ADLs  - Assess for home care needs following discharge   - Consider OT consult to assist with ADL evaluation and planning for discharge  - Provide patient education as appropriate  Outcome: Progressing  Goal: Maintain or return mobility status to optimal level  Description  INTERVENTIONS:  - Assess patient's baseline mobility status (ambulation, transfers, stairs, etc )    - Identify cognitive and physical deficits and behaviors that affect mobility  - Identify mobility aids required to assist with transfers and/or ambulation (gait belt, sit-to-stand, lift, walker, cane, etc )  - [place] fall precautions as indicated by assessment  - Record patient progress and toleration of activity level on Mobility SBAR; progress patient to next Phase/Stage  - Instruct patient to call for assistance with activity based on assessment  - Consider rehabilitation consult to assist with strengthening/weightbearing, etc   Outcome: Progressing     Problem: DISCHARGE PLANNING  Goal: Discharge to home or other facility with appropriate resources  Description  INTERVENTIONS:  - Identify barriers to discharge w/patient and caregiver  - Arrange for needed discharge resources and transportation as appropriate  - Identify discharge learning needs (meds, wound care, etc )  - Arrange for interpretive services to assist at discharge as needed  - Refer to Case Management Department for coordinating discharge planning if the patient needs post-hospital services based on physician/advanced practitioner order or complex needs related to functional status, cognitive ability, or social support system  Outcome: Progressing     Problem: Knowledge Deficit  Goal: Patient/family/caregiver demonstrates understanding of disease process, treatment plan, medications, and discharge instructions  Description  Complete learning assessment and assess knowledge base    Interventions:  - Provide teaching at level of understanding  - Provide teaching via preferred learning methods  Outcome: Progressing     Problem: Prexisting or High Potential for Compromised Skin Integrity  Goal: Skin integrity is maintained or improved  Description  INTERVENTIONS:  - Identify patients at risk for skin breakdown  - Assess and monitor skin integrity  - Assess and monitor nutrition and hydration status  - Monitor labs   - Assess for incontinence   - Turn and reposition patient  - Assist with mobility/ambulation  - Relieve pressure over bony prominences  - Avoid friction and shearing  - Provide appropriate hygiene as needed including keeping skin clean and dry  - Evaluate need for skin moisturizer/barrier cream  - Collaborate with interdisciplinary team   - Patient/family teaching  - Consider wound care consult   Outcome: Progressing

## 2020-07-06 PROBLEM — Z86.59 HISTORY OF ANXIETY: Status: ACTIVE | Noted: 2020-07-06

## 2020-07-06 PROBLEM — S22.41XA FRACTURE OF MULTIPLE RIBS OF RIGHT SIDE: Status: ACTIVE | Noted: 2020-07-03

## 2020-07-06 PROBLEM — Z86.711 HISTORY OF PULMONARY EMBOLISM: Status: ACTIVE | Noted: 2020-07-06

## 2020-07-06 LAB
ANION GAP SERPL CALCULATED.3IONS-SCNC: 0 MMOL/L (ref 4–13)
BASOPHILS # BLD AUTO: 0.03 THOUSANDS/ΜL (ref 0–0.1)
BASOPHILS NFR BLD AUTO: 0 % (ref 0–1)
BUN SERPL-MCNC: 14 MG/DL (ref 5–25)
CALCIUM SERPL-MCNC: 7.9 MG/DL (ref 8.3–10.1)
CHLORIDE SERPL-SCNC: 109 MMOL/L (ref 100–108)
CO2 SERPL-SCNC: 34 MMOL/L (ref 21–32)
CREAT SERPL-MCNC: 0.66 MG/DL (ref 0.6–1.3)
EOSINOPHIL # BLD AUTO: 0.76 THOUSAND/ΜL (ref 0–0.61)
EOSINOPHIL NFR BLD AUTO: 7 % (ref 0–6)
ERYTHROCYTE [DISTWIDTH] IN BLOOD BY AUTOMATED COUNT: 14.9 % (ref 11.6–15.1)
GFR SERPL CREATININE-BSD FRML MDRD: 96 ML/MIN/1.73SQ M
GLUCOSE SERPL-MCNC: 82 MG/DL (ref 65–140)
HCT VFR BLD AUTO: 31.1 % (ref 34.8–46.1)
HGB BLD-MCNC: 10 G/DL (ref 11.5–15.4)
IMM GRANULOCYTES # BLD AUTO: 0.12 THOUSAND/UL (ref 0–0.2)
IMM GRANULOCYTES NFR BLD AUTO: 1 % (ref 0–2)
LYMPHOCYTES # BLD AUTO: 3.13 THOUSANDS/ΜL (ref 0.6–4.47)
LYMPHOCYTES NFR BLD AUTO: 29 % (ref 14–44)
MCH RBC QN AUTO: 32.3 PG (ref 26.8–34.3)
MCHC RBC AUTO-ENTMCNC: 32.2 G/DL (ref 31.4–37.4)
MCV RBC AUTO: 100 FL (ref 82–98)
MONOCYTES # BLD AUTO: 0.9 THOUSAND/ΜL (ref 0.17–1.22)
MONOCYTES NFR BLD AUTO: 8 % (ref 4–12)
NEUTROPHILS # BLD AUTO: 6.02 THOUSANDS/ΜL (ref 1.85–7.62)
NEUTS SEG NFR BLD AUTO: 55 % (ref 43–75)
NRBC BLD AUTO-RTO: 0 /100 WBCS
PLATELET # BLD AUTO: 212 THOUSANDS/UL (ref 149–390)
PMV BLD AUTO: 10.5 FL (ref 8.9–12.7)
POTASSIUM SERPL-SCNC: 3.7 MMOL/L (ref 3.5–5.3)
RBC # BLD AUTO: 3.1 MILLION/UL (ref 3.81–5.12)
SODIUM SERPL-SCNC: 143 MMOL/L (ref 136–145)
WBC # BLD AUTO: 10.96 THOUSAND/UL (ref 4.31–10.16)

## 2020-07-06 PROCEDURE — 99232 SBSQ HOSP IP/OBS MODERATE 35: CPT | Performed by: SURGERY

## 2020-07-06 PROCEDURE — 80048 BASIC METABOLIC PNL TOTAL CA: CPT | Performed by: PHYSICIAN ASSISTANT

## 2020-07-06 PROCEDURE — 85025 COMPLETE CBC W/AUTO DIFF WBC: CPT | Performed by: PHYSICIAN ASSISTANT

## 2020-07-06 PROCEDURE — 99024 POSTOP FOLLOW-UP VISIT: CPT | Performed by: ORTHOPAEDIC SURGERY

## 2020-07-06 RX ORDER — LORAZEPAM 1 MG/1
2 TABLET ORAL 2 TIMES DAILY
Status: DISCONTINUED | OUTPATIENT
Start: 2020-07-06 | End: 2020-07-09 | Stop reason: HOSPADM

## 2020-07-06 RX ADMIN — OXYCODONE HYDROCHLORIDE 5 MG: 5 TABLET ORAL at 08:28

## 2020-07-06 RX ADMIN — ONDANSETRON 4 MG: 2 INJECTION INTRAMUSCULAR; INTRAVENOUS at 07:40

## 2020-07-06 RX ADMIN — ACETAMINOPHEN 975 MG: 325 TABLET, FILM COATED ORAL at 12:45

## 2020-07-06 RX ADMIN — MEXILETINE HYDROCHLORIDE 150 MG: 150 CAPSULE ORAL at 21:36

## 2020-07-06 RX ADMIN — OXYCODONE HYDROCHLORIDE 2.5 MG: 5 TABLET ORAL at 16:56

## 2020-07-06 RX ADMIN — MEXILETINE HYDROCHLORIDE 150 MG: 150 CAPSULE ORAL at 08:32

## 2020-07-06 RX ADMIN — GABAPENTIN 300 MG: 300 CAPSULE ORAL at 08:29

## 2020-07-06 RX ADMIN — MEXILETINE HYDROCHLORIDE 150 MG: 150 CAPSULE ORAL at 16:59

## 2020-07-06 RX ADMIN — MELATONIN 5000 UNITS: at 08:29

## 2020-07-06 RX ADMIN — ACETAMINOPHEN 975 MG: 325 TABLET, FILM COATED ORAL at 05:05

## 2020-07-06 RX ADMIN — LIDOCAINE 1 PATCH: 50 PATCH CUTANEOUS at 08:29

## 2020-07-06 RX ADMIN — ACETAMINOPHEN 975 MG: 325 TABLET, FILM COATED ORAL at 21:36

## 2020-07-06 RX ADMIN — APIXABAN 5 MG: 5 TABLET, FILM COATED ORAL at 16:56

## 2020-07-06 RX ADMIN — OXYCODONE HYDROCHLORIDE 2.5 MG: 5 TABLET ORAL at 12:45

## 2020-07-06 RX ADMIN — ARIPIPRAZOLE 7.5 MG: 15 TABLET ORAL at 08:32

## 2020-07-06 RX ADMIN — FLUVOXAMINE MALEATE 150 MG: 50 TABLET ORAL at 21:35

## 2020-07-06 RX ADMIN — OXYCODONE HYDROCHLORIDE 2.5 MG: 5 TABLET ORAL at 21:36

## 2020-07-06 RX ADMIN — OXYCODONE HYDROCHLORIDE 5 MG: 5 TABLET ORAL at 05:06

## 2020-07-06 RX ADMIN — LORAZEPAM 2 MG: 1 TABLET ORAL at 09:55

## 2020-07-06 RX ADMIN — ONDANSETRON 4 MG: 2 INJECTION INTRAMUSCULAR; INTRAVENOUS at 23:50

## 2020-07-06 RX ADMIN — FERROUS SULFATE TAB 325 MG (65 MG ELEMENTAL FE) 325 MG: 325 (65 FE) TAB at 08:29

## 2020-07-06 RX ADMIN — ENOXAPARIN SODIUM 30 MG: 30 INJECTION SUBCUTANEOUS at 08:31

## 2020-07-06 RX ADMIN — OXYCODONE HYDROCHLORIDE 5 MG: 5 TABLET ORAL at 01:06

## 2020-07-06 RX ADMIN — GABAPENTIN 300 MG: 300 CAPSULE ORAL at 21:36

## 2020-07-06 RX ADMIN — LORAZEPAM 2 MG: 1 TABLET ORAL at 16:56

## 2020-07-06 RX ADMIN — GABAPENTIN 300 MG: 300 CAPSULE ORAL at 16:56

## 2020-07-06 RX ADMIN — APIXABAN 5 MG: 5 TABLET, FILM COATED ORAL at 09:55

## 2020-07-06 RX ADMIN — LAMOTRIGINE 200 MG: 100 TABLET ORAL at 08:29

## 2020-07-06 RX ADMIN — DOCUSATE SODIUM 100 MG: 100 CAPSULE, LIQUID FILLED ORAL at 08:29

## 2020-07-06 NOTE — SOCIAL WORK
Patient needs rehab  Patient requested [facility]  CM placed referral and it is pending at this time  Update:    Patient also chose [facility] as 2nd choice and [facility] as 3rd  Referrals placed

## 2020-07-06 NOTE — ASSESSMENT & PLAN NOTE
- patient is now postop in signed off by Orthopedics  - appropriate to start Eliquis 5 mg b i d   Today on 07/06/2020  - outpatient follow-up with PCP

## 2020-07-06 NOTE — PLAN OF CARE
Problem: Potential for Falls  Goal: Patient will remain free of falls  Description  INTERVENTIONS:  - Assess patient frequently for physical needs  -  Identify cognitive and physical deficits and behaviors that affect risk of falls    -  [place] fall precautions as indicated by assessment   - Educate patient/family on patient safety including physical limitations  - Instruct patient to call for assistance with activity based on assessment  - Modify environment to reduce risk of injury  - Consider OT/PT consult to assist with strengthening/mobility  Outcome: Progressing     Problem: PAIN - ADULT  Goal: Verbalizes/displays adequate comfort level or baseline comfort level  Description  Interventions:  - Encourage patient to monitor pain and request assistance  - Assess pain using appropriate pain scale  - Administer analgesics based on type and severity of pain and evaluate response  - Implement non-pharmacological measures as appropriate and evaluate response  - Consider cultural and social influences on pain and pain management  - Notify physician/advanced practitioner if interventions unsuccessful or patient reports new pain  Outcome: Progressing     Problem: INFECTION - ADULT  Goal: Absence or prevention of progression during hospitalization  Description  INTERVENTIONS:  - Assess and monitor for signs and symptoms of infection  - Monitor lab/diagnostic results  - Monitor all insertion sites, i e  indwelling lines, tubes, and drains  - Monitor endotracheal if appropriate and nasal secretions for changes in amount and color  - [place] appropriate cooling/warming therapies per order  - Administer medications as ordered  - Instruct and encourage patient and family to use good hand hygiene technique  - Identify and instruct in appropriate isolation precautions for identified infection/condition  Outcome: Progressing     Problem: SAFETY ADULT  Goal: Patient will remain free of falls  Description  INTERVENTIONS:  - Assess patient frequently for physical needs  -  Identify cognitive and physical deficits and behaviors that affect risk of falls    -  [place] fall precautions as indicated by assessment   - Educate patient/family on patient safety including physical limitations  - Instruct patient to call for assistance with activity based on assessment  - Modify environment to reduce risk of injury  - Consider OT/PT consult to assist with strengthening/mobility  Outcome: Progressing  Goal: Maintain or return to baseline ADL function  Description  INTERVENTIONS:  -  Assess patient's ability to carry out ADLs; assess patient's baseline for ADL function and identify physical deficits which impact ability to perform ADLs (bathing, care of mouth/teeth, toileting, grooming, dressing, etc )  - Assess/evaluate cause of self-care deficits   - Assess range of motion  - Assess patient's mobility; develop plan if impaired  - Assess patient's need for assistive devices and provide as appropriate  - Encourage maximum independence but intervene and supervise when necessary  - Involve family in performance of ADLs  - Assess for home care needs following discharge   - Consider OT consult to assist with ADL evaluation and planning for discharge  - Provide patient education as appropriate  Outcome: Progressing  Goal: Maintain or return mobility status to optimal level  Description  INTERVENTIONS:  - Assess patient's baseline mobility status (ambulation, transfers, stairs, etc )    - Identify cognitive and physical deficits and behaviors that affect mobility  - Identify mobility aids required to assist with transfers and/or ambulation (gait belt, sit-to-stand, lift, walker, cane, etc )  - [place] fall precautions as indicated by assessment  - Record patient progress and toleration of activity level on Mobility SBAR; progress patient to next Phase/Stage  - Instruct patient to call for assistance with activity based on assessment  - Consider rehabilitation consult to assist with strengthening/weightbearing, etc   Outcome: Progressing     Problem: DISCHARGE PLANNING  Goal: Discharge to home or other facility with appropriate resources  Description  INTERVENTIONS:  - Identify barriers to discharge w/patient and caregiver  - Arrange for needed discharge resources and transportation as appropriate  - Identify discharge learning needs (meds, wound care, etc )  - Arrange for interpretive services to assist at discharge as needed  - Refer to Case Management Department for coordinating discharge planning if the patient needs post-hospital services based on physician/advanced practitioner order or complex needs related to functional status, cognitive ability, or social support system  Outcome: Progressing     Problem: Knowledge Deficit  Goal: Patient/family/caregiver demonstrates understanding of disease process, treatment plan, medications, and discharge instructions  Description  Complete learning assessment and assess knowledge base    Interventions:  - Provide teaching at level of understanding  - Provide teaching via preferred learning methods  Outcome: Progressing     Problem: Prexisting or High Potential for Compromised Skin Integrity  Goal: Skin integrity is maintained or improved  Description  INTERVENTIONS:  - Identify patients at risk for skin breakdown  - Assess and monitor skin integrity  - Assess and monitor nutrition and hydration status  - Monitor labs   - Assess for incontinence   - Turn and reposition patient  - Assist with mobility/ambulation  - Relieve pressure over bony prominences  - Avoid friction and shearing  - Provide appropriate hygiene as needed including keeping skin clean and dry  - Evaluate need for skin moisturizer/barrier cream  - Collaborate with interdisciplinary team   - Patient/family teaching  - Consider wound care consult   Outcome: Progressing

## 2020-07-06 NOTE — PROGRESS NOTES
Progress Note - Clarisa Tran 1960, 62 y o  female MRN: [id number]    Unit/Bed#: [facility] 628-01 Encounter: [id number]    Primary Care Provider: Lex Maloney DO   Date and time admitted to hospital: 7/3/2020  8:01 PM        History of pulmonary embolism  Assessment & Plan  - patient is now postop in signed off by Orthopedics  - appropriate to start Eliquis 5 mg b i d   Today on 07/06/2020  - outpatient follow-up with PCP    History of anxiety  Assessment & Plan  - confirmed home medication regimen through [place] [id number] as well as pharmacy  - patient takes 2 mg of Ativan twice daily  - also takes 4 mg of Ativan at night  - will have her follow up outpatient with PCP  - limit usage today of benzodiazepine to 2 mg twice daily secondary to rib fractures  - will monitor for evidence of withdrawal    Bilateral pulmonary contusion  Assessment & Plan  - repeat chest x-ray was stable  - patient is off oxygen  - out of bed to chair    T9 vertebral fracture (HCC)  Assessment & Plan  - Acute minimally displaced right lateral osteophyte fracture of the T9 vertebral body  - neurologically intact  - patient has no midline T spine tenderness  - pain control, PT OT    Hematoma of parietal scalp  Assessment & Plan  - 7/3 CT H no acute intracranial abnormality, no calvarial fracture    Fracture of multiple ribs of right side  Assessment & Plan  - Minimally displaced right anterior 4th, 5th, 6th rib fractures without pneumothorax  - Peripheral ground-glass opacities in bilateral upper lobes, possibly from contusions versus infectious versus other inflammatory process  - Rib fracture protocol  - follow-up chest x-ray 7/4: no acute cardiopulmonary disease, acute right anterior rib fractures on CT are not visible, no PTX    Closed fracture of distal ends of left radius and ulna  Assessment & Plan  - orthopedics consulted, appreciate input  - neurovascular checks  - 7/4 to OR for ORIF of her left forearm radius and ulnar shaft fractures   - pain control, PT OT  - Patient does have high utilizer plan in place for apparent drug-seeking behavior  - transition back to home Eliquis  - orthopedics signed off    * Fall down stairs  Assessment & Plan  - As below including pt/ot    DVT prophylaxis: SCDs and Eliquis  PT and OT: eval and treat    Disposition:  Patient is medically appropriate for discharge  Awaiting Case Management to find placement  SUBJECTIVE:  Chief Complaint: "No new complaints "    Subjective:  Patient without complaints today  Reports she wants to take her Ativan that she normally takes at home        OBJECTIVE:     Meds/Allergies     Current Facility-Administered Medications:     acetaminophen (TYLENOL) tablet 975 mg, 975 mg, Oral, Q8H [address], Edwin Acevedo MD, 975 mg at 07/06/20 0505    apixaban (ELIQUIS) tablet 5 mg, 5 mg, Oral, BID, Nancy Vora PA-C, 5 mg at 07/06/20 0955    ARIPiprazole (ABILIFY) tablet 7 5 mg, 7 5 mg, Oral, Daily, Willie Dickens MD, 7 5 mg at 07/06/20 2029    cholecalciferol (VITAMIN D3) tablet 5,000 Units, 5,000 Units, Oral, Daily, Willie Dickens MD, 5,000 Units at 07/06/20 7408    docusate sodium (COLACE) capsule 100 mg, 100 mg, Oral, BID, Willie Dickens MD, 100 mg at 07/06/20 2488    ferrous sulfate tablet 325 mg, 325 mg, Oral, Daily With Breakfast, Willie Dickens MD, 325 mg at 07/06/20 0829    fluvoxaMINE (LUVOX) tablet 150 mg, 150 mg, Oral, HS, Willie Dickens MD, 150 mg at 07/05/20 2104    gabapentin (NEURONTIN) capsule 300 mg, 300 mg, Oral, TID, Willie Dickens MD, 300 mg at 07/06/20 4546    lamoTRIgine (LaMICtal) tablet 200 mg, 200 mg, Oral, Daily, Maryann Acevedo MD, 200 mg at 07/06/20 0829    lidocaine (LIDODERM) 5 % patch 1 patch, 1 patch, Topical, Daily, Willie Dickens MD, 1 patch at 07/06/20 5895    LORazepam (ATIVAN) tablet 2 mg, 2 mg, Oral, BID, Simi Ingram PA-C, 2 mg at 07/06/20 0955    meclizine (ANTIVERT) tablet 12 5 mg, 12 5 mg, Oral, Q8H PRN, Ernestine Crofto MD Kristina, 12 5 mg at 07/05/20 1545    mexiletine (MEXITIL) capsule 150 mg, 150 mg, Oral, TID, Phyllis Heart MD, 150 mg at 07/06/20 0832    naloxone (NARCAN) 0 04 mg/mL syringe 0 04 mg, 0 04 mg, Intravenous, Q1MIN PRN, Phyllis Heart MD    ondansetron [facility]) injection 4 mg, 4 mg, Intravenous, Q6H PRN, Phyllis Heart MD, 4 mg at 07/06/20 0740    oxyCODONE (ROXICODONE) IR tablet 2 5 mg, 2 5 mg, Oral, Q4H PRN **OR** [DISCONTINUED] oxyCODONE (ROXICODONE) IR tablet 5 mg, 5 mg, Oral, Q4H PRN, Giovanni Brown PA-C, 5 mg at 07/06/20 0828     Vitals:   Vitals:    07/06/20 0733   BP: 118/66   Pulse: 78   Resp: 18   Temp: 98 1 °F (36 7 °C)   SpO2:        Intake/Output:  I/O       07/04 0701 - 07/05 0700 07/05 0701 - 07/06 0700 07/06 0701 - 07/07 0700    P  O  240      I V  (mL/kg) 1716 7      IV Piggyback 250      Total Intake(mL/kg) 2206 7      Urine (mL/kg/hr) 330 1470 (0 6)     Total Output 330 1470     Net +1876 7 -1470            Unmeasured Urine Occurrence 2 x      Unmeasured Stool Occurrence   1 x           Nutrition/GI Proph/Bowel Reg:  Continue current diet    Physical Exam:   GENERAL APPEARANCE:  No acute distress  NEURO:  GCS 15  HEENT:  Normocephalic  CV:  Regular rate and rhythm  LUNGS:  CTA bilaterally  GI:  Nontender, nondistended  :  No [place]  MSK:  +2 pulses on extremities  SKIN:  Warm, dry, intact    Invasive Devices     Peripheral Intravenous Line            Peripheral IV 07/04/20 Right Forearm 1 day                 Lab Results:   Results: I have personally reviewed pertinent reports   , BMP/CMP:   Lab Results   Component Value Date    SODIUM 143 07/06/2020    K 3 7 07/06/2020     (H) 07/06/2020    CO2 34 (H) 07/06/2020    BUN 14 07/06/2020    CREATININE 0 66 07/06/2020    CALCIUM 7 9 (L) 07/06/2020    EGFR 96 07/06/2020    and CBC:   Lab Results   Component Value Date    WBC 10 96 (H) 07/06/2020    HGB 10 0 (L) 07/06/2020    HCT 31 1 (L) 07/06/2020     (H) 07/06/2020     07/06/2020    MCH 32 3 07/06/2020    MCHC 32 2 07/06/2020    RDW 14 9 07/06/2020    MPV 10 5 07/06/2020    NRBC 0 07/06/2020     Imaging/EKG Studies: Results: I have personally reviewed pertinent reports      Other Studies:  No other studies  VTE Prophylaxis: SCDs and Eliqiuis

## 2020-07-06 NOTE — ASSESSMENT & PLAN NOTE
- confirmed home medication regimen through [address] as well as pharmacy  - patient takes 2 mg of Ativan twice daily  - also takes 4 mg of Ativan at night  - will have her follow up outpatient with PCP  - limit usage today of benzodiazepine to 2 mg twice daily secondary to rib fractures  - will monitor for evidence of withdrawal

## 2020-07-06 NOTE — UTILIZATION REVIEW
Notification of Inpatient Admission/Inpatient Authorization Request   This is a Notification of Inpatient Admission for [address]  Be advised that this patient was admitted to our facility under Inpatient Status  Contact Yvrose Pretty at [phone] for additional admission information  [facility]PT  DEDICATED -[phone]  Patient Name:   Rachna Marroquin   YOB: 1960       [address]   [address]:   [facility]  Tax ID: [id number]  NPI: [id number] Attending Provider/NPI:  Phone:  Address: Heike Valles [[id number]]  [phone]  Same as Facility   Place of Service Code: 24 Place of Service Name:  [address] [place] [address]   Start Date: 7/3/20 2055 Discharge Date & Time: No discharge date for patient encounter  Type of Admission: Inpatient Status Discharge Disposition (if discharged): [address] [place] [address]   Patient Diagnoses: Closed fracture of distal ends of left radius and ulna, initial encounter [S52 502A, S52 602A]     Orders: Admission Orders (From admission, onward)     Ordered        07/03/20 2059  Inpatient Admission  Once                    Assigned Utilization Review Contact: Yvrose Pretty  Utilization ,   Network Utilization Review Department  Phone: [phone]; Fax [phone]   Email: [email or web address]   ATTENTION PAYERS: Please call the assigned Utilization  directly with any questions or concerns ALL voicemails in the department are confidential  Send all requests for admission clinical reviews, approved or denied determinations and any other requests to dedicated fax number belonging to the campus where the patient is receiving treatment

## 2020-07-06 NOTE — ASSESSMENT & PLAN NOTE
- orthopedics consulted, appreciate input  - neurovascular checks  - 7/4 to OR for ORIF of her left forearm radius and ulnar shaft fractures   - pain control, PT OT  - Patient does have high utilizer plan in place for apparent drug-seeking behavior  - transition back to home Eliquis  - orthopedics signed off

## 2020-07-06 NOTE — PROGRESS NOTES
Subjective: No acute events overnight  No acute distress  Resting comfortably in bed    Objective:  A 10 point ROS was performed; negative except as noted above  Lab Results   Component Value Date/Time    WBC 10 96 (H) 07/06/2020 05:13 AM    WBC 7 66 07/07/2015 04:04 PM    HGB 10 0 (L) 07/06/2020 05:13 AM    HGB 12 3 07/07/2015 04:04 PM       Vitals:    07/05/20 2350   BP: 116/68   Pulse: 78   Resp: 18   Temp: 99 7 °F (37 6 °C)   SpO2: 92%     Left upper extremity  Splint C/D/I   Minimal swelling in fingers  Motor grossly intact to median, radial and ulnar nerve distributions  Sensation intact to light touch in m/r/u/mc/ax distributions  Digits warm and well perfused with brisk capillary refill     Assessment: 62 y o  female POD 2 ORIF L BBFF    Plan:  NWSANDHYA GRIFFIN in splint  Pain control  DVT ppx: Enoxaparin (Lovenox)  PT/OT  Will continue to assess for acute blood loss anemia  Dispo: Ortho will follow

## 2020-07-07 ENCOUNTER — APPOINTMENT (INPATIENT)
Dept: RADIOLOGY | Facility: HOSPITAL | Age: 60
DRG: 511 | End: 2020-07-07
Payer: COMMERCIAL

## 2020-07-07 PROBLEM — S22.079A T9 VERTEBRAL FRACTURE (HCC): Status: RESOLVED | Noted: 2020-07-03 | Resolved: 2020-07-07

## 2020-07-07 PROCEDURE — NC001 PR NO CHARGE: Performed by: ORTHOPAEDIC SURGERY

## 2020-07-07 PROCEDURE — 73090 X-RAY EXAM OF FOREARM: CPT

## 2020-07-07 PROCEDURE — 99232 SBSQ HOSP IP/OBS MODERATE 35: CPT | Performed by: SURGERY

## 2020-07-07 PROCEDURE — 97535 SELF CARE MNGMENT TRAINING: CPT

## 2020-07-07 PROCEDURE — 99024 POSTOP FOLLOW-UP VISIT: CPT | Performed by: ORTHOPAEDIC SURGERY

## 2020-07-07 RX ORDER — HYDROMORPHONE HCL/PF 1 MG/ML
1 SYRINGE (ML) INJECTION ONCE
Status: DISCONTINUED | OUTPATIENT
Start: 2020-07-07 | End: 2020-07-09 | Stop reason: HOSPADM

## 2020-07-07 RX ORDER — HYDROMORPHONE HCL/PF 1 MG/ML
1 SYRINGE (ML) INJECTION ONCE
Status: COMPLETED | OUTPATIENT
Start: 2020-07-07 | End: 2020-07-07

## 2020-07-07 RX ORDER — OXYCODONE HYDROCHLORIDE 5 MG/1
5 TABLET ORAL EVERY 4 HOURS PRN
Status: DISCONTINUED | OUTPATIENT
Start: 2020-07-07 | End: 2020-07-09 | Stop reason: HOSPADM

## 2020-07-07 RX ADMIN — MEXILETINE HYDROCHLORIDE 150 MG: 150 CAPSULE ORAL at 21:42

## 2020-07-07 RX ADMIN — FERROUS SULFATE TAB 325 MG (65 MG ELEMENTAL FE) 325 MG: 325 (65 FE) TAB at 09:53

## 2020-07-07 RX ADMIN — DOCUSATE SODIUM 100 MG: 100 CAPSULE, LIQUID FILLED ORAL at 09:53

## 2020-07-07 RX ADMIN — APIXABAN 5 MG: 5 TABLET, FILM COATED ORAL at 09:53

## 2020-07-07 RX ADMIN — OXYCODONE HYDROCHLORIDE 2.5 MG: 5 TABLET ORAL at 01:59

## 2020-07-07 RX ADMIN — GABAPENTIN 300 MG: 300 CAPSULE ORAL at 09:52

## 2020-07-07 RX ADMIN — OXYCODONE HYDROCHLORIDE 5 MG: 5 TABLET ORAL at 23:50

## 2020-07-07 RX ADMIN — ACETAMINOPHEN 975 MG: 325 TABLET, FILM COATED ORAL at 05:35

## 2020-07-07 RX ADMIN — ARIPIPRAZOLE 7.5 MG: 15 TABLET ORAL at 09:54

## 2020-07-07 RX ADMIN — OXYCODONE HYDROCHLORIDE 2.5 MG: 5 TABLET ORAL at 14:41

## 2020-07-07 RX ADMIN — MEXILETINE HYDROCHLORIDE 150 MG: 150 CAPSULE ORAL at 17:05

## 2020-07-07 RX ADMIN — LAMOTRIGINE 200 MG: 100 TABLET ORAL at 09:52

## 2020-07-07 RX ADMIN — OXYCODONE HYDROCHLORIDE 2.5 MG: 5 TABLET ORAL at 06:17

## 2020-07-07 RX ADMIN — HYDROMORPHONE HYDROCHLORIDE 1 MG: 1 INJECTION, SOLUTION INTRAMUSCULAR; INTRAVENOUS; SUBCUTANEOUS at 11:56

## 2020-07-07 RX ADMIN — FLUVOXAMINE MALEATE 150 MG: 50 TABLET ORAL at 21:42

## 2020-07-07 RX ADMIN — MEXILETINE HYDROCHLORIDE 150 MG: 150 CAPSULE ORAL at 09:53

## 2020-07-07 RX ADMIN — OXYCODONE HYDROCHLORIDE 5 MG: 5 TABLET ORAL at 18:00

## 2020-07-07 RX ADMIN — MELATONIN 5000 UNITS: at 09:52

## 2020-07-07 RX ADMIN — ACETAMINOPHEN 975 MG: 325 TABLET, FILM COATED ORAL at 14:41

## 2020-07-07 RX ADMIN — DOCUSATE SODIUM 100 MG: 100 CAPSULE, LIQUID FILLED ORAL at 17:06

## 2020-07-07 RX ADMIN — LORAZEPAM 2 MG: 1 TABLET ORAL at 17:05

## 2020-07-07 RX ADMIN — LORAZEPAM 2 MG: 1 TABLET ORAL at 09:52

## 2020-07-07 RX ADMIN — LIDOCAINE 1 PATCH: 50 PATCH CUTANEOUS at 09:53

## 2020-07-07 RX ADMIN — GABAPENTIN 300 MG: 300 CAPSULE ORAL at 17:05

## 2020-07-07 RX ADMIN — GABAPENTIN 300 MG: 300 CAPSULE ORAL at 21:42

## 2020-07-07 RX ADMIN — ACETAMINOPHEN 975 MG: 325 TABLET, FILM COATED ORAL at 21:42

## 2020-07-07 RX ADMIN — APIXABAN 5 MG: 5 TABLET, FILM COATED ORAL at 17:05

## 2020-07-07 NOTE — ASSESSMENT & PLAN NOTE
- orthopedics consulted, appreciate input  - neurovascular checks  - 7/4 to OR for ORIF of her left forearm radius and ulnar shaft fractures   - pain control, PT OT  - Patient does have high utilizer plan in place for apparent drug-seeking behavior  - orthopedics signed off

## 2020-07-07 NOTE — PROGRESS NOTES
Progress Note - Anatoly Zhu 1960, 62 y o  female MRN: [id number]    Unit/Bed#: [facility] [place] 628-01 Encounter: [id number]    Primary Care Provider: Margaret Marroquin DO   Date and time admitted to hospital: 7/3/2020  8:01 PM        History of pulmonary embolism  Assessment & Plan  - patient is now postop in signed off by Orthopedics  -continue PTA BID Eliquis 5mg  - outpatient follow-up with PCP    History of anxiety  Assessment & Plan  - confirmed home medication regimen through [facility] as well as pharmacy  - patient takes 2 mg of Ativan twice daily  - also takes 4 mg of Ativan at night  - will have her follow up outpatient with PCP  - limit usage today of benzodiazepine to 2 mg twice daily secondary to rib fractures  - will monitor for evidence of withdrawal    Bilateral pulmonary contusion  Assessment & Plan  - repeat chest x-ray was stable  - patient is off oxygen  - out of bed to chair    T9 vertebral fracture (HCC)  Assessment & Plan  - Acute minimally displaced right lateral osteophyte fracture of the T9 vertebral body  - neurologically intact  - patient has no midline T spine tenderness  - pain control, PT OT    Hematoma of parietal scalp  Assessment & Plan  - 7/3 CT H no acute intracranial abnormality, no calvarial fracture    Fracture of multiple ribs of right side  Assessment & Plan  - Minimally displaced right anterior 4th, 5th, 6th rib fractures without pneumothorax  - Peripheral ground-glass opacities in bilateral upper lobes, possibly from contusions versus infectious versus other inflammatory process  - Rib fracture protocol  - follow-up chest x-ray 7/4: no acute cardiopulmonary disease, acute right anterior rib fractures on CT are not visible, no PTX    Closed fracture of distal ends of left radius and ulna  Assessment & Plan  - orthopedics consulted, appreciate input  - neurovascular checks  - 7/4 to OR for ORIF of her left forearm radius and ulnar shaft fractures   - pain control, PT OT  - Patient does have high utilizer plan in place for apparent drug-seeking behavior  - orthopedics signed off    Right arm pain  Assessment & Plan  -swollen, ecchymotic, tender  -will obtain plain film    * Fall down stairs  Assessment & Plan  - As below including pt/ot      Disposition: awaiting CM placement       SUBJECTIVE:  Chief Complaint: : "My arms hurt"    Subjective: patient has complaints of B/L UE pain R>L and swelling   States that she slept well overnight, was able to tolerate breakfast       OBJECTIVE:     Meds/Allergies     Current Facility-Administered Medications:     acetaminophen (TYLENOL) tablet 975 mg, 975 mg, Oral, Q8H [address], Edwin Acevedo MD, 975 mg at 07/07/20 0535    apixaban (ELIQUIS) tablet 5 mg, 5 mg, Oral, BID, Eber Ingram PA-C, 5 mg at 07/06/20 1656    ARIPiprazole (ABILIFY) tablet 7 5 mg, 7 5 mg, Oral, Daily, Ariela Mukherjee MD, 7 5 mg at 07/06/20 5731    cholecalciferol (VITAMIN D3) tablet 5,000 Units, 5,000 Units, Oral, Daily, Ariela Mukherjee MD, 5,000 Units at 07/06/20 0207    docusate sodium (COLACE) capsule 100 mg, 100 mg, Oral, BID, Ariela Mukherjee MD, Stopped at 07/06/20 1800    ferrous sulfate tablet 325 mg, 325 mg, Oral, Daily With Breakfast, Ariela Mukherjee MD, 325 mg at 07/06/20 0829    fluvoxaMINE (LUVOX) tablet 150 mg, 150 mg, Oral, HS, Ariela Mukherjee MD, 150 mg at 07/06/20 2135    gabapentin (NEURONTIN) capsule 300 mg, 300 mg, Oral, TID, Ariela Mukherjee MD, 300 mg at 07/06/20 2136    lamoTRIgine (LaMICtal) tablet 200 mg, 200 mg, Oral, Daily, Ariela Mukherjee MD, 200 mg at 07/06/20 0829    lidocaine (LIDODERM) 5 % patch 1 patch, 1 patch, Topical, Daily, Ariela Mukherjee MD, 1 patch at 07/06/20 8491    LORazepam (ATIVAN) tablet 2 mg, 2 mg, Oral, BID, Velázquez Gilles Ingram PA-C, 2 mg at 07/06/20 1656    meclizine (ANTIVERT) tablet 12 5 mg, 12 5 mg, Oral, Q8H PRN, Ariela Mukherjee MD, 12 5 mg at 07/05/20 1545    mexiletine (MEXITIL) capsule 150 mg, 150 mg, Oral, TID, Maxi Murillo MD, 150 mg at 07/06/20 2136    naloxone (NARCAN) 0 04 mg/mL syringe 0 04 mg, 0 04 mg, Intravenous, Q1MIN PRN, Maxi Murillo MD    ondansetron [facility]) injection 4 mg, 4 mg, Intravenous, Q6H PRN, Maxi Murillo MD, 4 mg at 07/06/20 2350    oxyCODONE (ROXICODONE) IR tablet 2 5 mg, 2 5 mg, Oral, Q4H PRN, 2 5 mg at 07/07/20 0617 **OR** [DISCONTINUED] oxyCODONE (ROXICODONE) IR tablet 5 mg, 5 mg, Oral, Q4H PRN, Antoine Colorado PA-C, 5 mg at 07/06/20 0828     Vitals:   Vitals:    07/07/20 0815   BP: 123/68   Pulse: 78   Resp: 18   Temp: 99 6 °F (37 6 °C)   SpO2: 91%       Intake/Output:  I/O       07/05 0701 - 07/06 0700 07/06 0701 - 07/07 0700 07/07 0701 - 07/08 0700    P  O   238     I V  (mL/kg)       IV Piggyback       Total Intake(mL/kg)  238 (2 1)     Urine (mL/kg/hr) 1470 (0 6) 175 (0 1) 100 (1 9)    Total Output 1470 175 100    Net -1470 +63 -100           Unmeasured Urine Occurrence  4 x     Unmeasured Stool Occurrence  2 x            Nutrition/GI Proph/Bowel Reg: regular, colace, eliquis     Physical Exam:   GENERAL APPEARANCE: WNWD, obese   NEURO: AAOx4, flat affect   HEENT: PERRLA  CV: RRR  LUNGS: CTAB  GI: soft, non-tender   MSK: RUE hand and forearm swollen with ecchymosis, tender to palpation, full strength  R forearm in ACE wrap   SKIN: warm/dry    Invasive Devices     Peripheral Intravenous Line            Peripheral IV 07/04/20 Right Forearm 2 days                 Lab Results: Results: I have personally reviewed pertinent reports     and I have personally reviewed pertinent films in PACS, BMP/CMP: No results found for: SODIUM, K, CL, CO2, ANIONGAP, BUN, CREATININE, GLUCOSE, CALCIUM, AST, ALT, ALKPHOS, PROT, BILITOT, EGFR, CBC: No results found for: WBC, HGB, HCT, MCV, PLT, ADJUSTEDWBC, MCH, MCHC, RDW, MPV, NRBC and Coagulation: No results found for: PT, INR, APTT    VTE Prophylaxis: Reason for no pharmacologic prophylaxis Eliquis

## 2020-07-07 NOTE — OCCUPATIONAL THERAPY NOTE
Occupational Therapy Treatment Note:       07/07/20 6660   Restrictions/Precautions   Weight Bearing Precautions Per Order Yes   RUE Weight Bearing Per Order NWB   LUE Weight Bearing Per Order NWB   Braces or Orthoses Splint  (bilateral ue splints)   Pain Assessment   Pain Assessment Tool FLACC   Pain Rating: FLACC (Rest) - Face 0   Pain Rating: FLACC (Rest) - Legs 0   Pain Rating: FLACC (Rest) - Activity 0   Pain Rating: FLACC (Rest) - Cry 0   Pain Rating: FLACC (Rest) - Consolability 0   Score: FLACC (Rest) 0   Pain Rating: FLACC (Activity) - Face 1   Pain Rating: FLACC (Activity) - Legs 0   Pain Rating: FLACC (Activity) - Activity 0   Pain Rating: FLACC (Activity) - Cry 1   Pain Rating: FLACC (Activity) - Consolability 1   Score: FLACC (Activity) 3   ADL   Toileting Assistance  2  Maximal Assistance   Toileting Deficit Clothing management up;Clothing management down;Perineal hygiene   Toileting Comments   (pt walked to from toilet and used commode with min/mod a x 2)   Transfers   Stand pivot 3  Moderate assistance   Additional items   (min to mod asst x2)   Functional Mobility   Functional Mobility 3  Moderate assistance   Additional Comments   (a x 2 min to mod asst )   Additional items   (trunk hold assist)   Cognition   Arousal/Participation Alert   Attention Attends with cues to redirect   Following Commands Follows one step commands without difficulty   Activity Tolerance   Activity Tolerance Patient tolerated treatment well   Assessment   Assessment pt now with b nwbing and ue splints / acewraps  pt with new find  ue fx since i e  spoke with otr/l about b ue nwbing  pt participated in pm ot session and was seen focusing on toileting, bed mobility, functional transfers, short distance functional mobility and activity tolerence  pt was able to get oob with mod asst x 1 with hob elevated   she was able to stand and walk using small steps towards bathroom without a d  using mod asst x 1 and min of a second person  pt required moderate cues for technique and to continue walking  will follow focusing on goals from ie     Plan   Goal Expiration Date 07/15/20   OT Treatment Day 2   OT Frequency 3-5x/wk   Recommendation   OT Discharge Recommendation Post-Acute Rehabilitation Services   April Salol, [place]

## 2020-07-07 NOTE — SOCIAL WORK
[place] [facility] [address] [facility] can't accept  [facility] accept  Cm initiated auth and faxed clinical information to pt's insurance     Pending ref # [id number]

## 2020-07-07 NOTE — PLAN OF CARE
Problem: OCCUPATIONAL THERAPY ADULT  Goal: Performs self-care activities at highest level of function for planned discharge setting  See evaluation for individualized goals  Description  Treatment Interventions: ADL retraining, Functional transfer training, UE strengthening/ROM, Endurance training, Cognitive reorientation, Patient/family training, Equipment evaluation/education, Neuromuscular reeducation, Compensatory technique education, Continued evaluation, Activityengagement  Equipment Recommended: Tub seat with back(bariatric)       See flowsheet documentation for full assessment, interventions and recommendations  Outcome: Progressing  Note:   Limitation: Decreased ADL status, Decreased UE strength, Decreased Safe judgement during ADL, Decreased endurance, Decreased cognition, Decreased self-care trans, Decreased high-level ADLs, Decreased fine motor control  Prognosis: Fair  Assessment: pt now with b nwbing and ue splints / acewraps  pt with new find  ue fx since i e  spoke with otr/l about b ue nwbing  pt participated in pm ot session and was seen focusing on toileting, bed mobility, functional transfers, short distance functional mobility and activity tolerence  pt was able to get oob with mod asst x 1 with hob elevated  she was able to stand and walk using small steps towards bathroom without a d  using mod asst x 1 and min of a second person  pt required moderate cues for technique and to continue walking  will follow focusing on goals from ie       OT Discharge Recommendation: Post-Acute Rehabilitation Services  OT - OK to Discharge: Yes(when medically stable)  Kirsten Bourgeois

## 2020-07-07 NOTE — PROGRESS NOTES
Subjective: No acute events overnight  No acute distress  Resting comfortably in bed    Objective:  A 10 point ROS was performed; negative except as noted above       Lab Results   Component Value Date/Time    WBC 10 96 (H) 07/06/2020 05:13 AM    WBC 7 66 07/07/2015 04:04 PM    HGB 10 0 (L) 07/06/2020 05:13 AM    HGB 12 3 07/07/2015 04:04 PM       Vitals:    07/06/20 2212   BP: 119/67   Pulse: 77   Resp:    Temp: 99 °F (37 2 °C)   SpO2: 93%     Left upper extremity  Splint C/D/I  Motor grossly intact to median, radial and ulnar nerve distributions  Sensation intact to light touch in m/r/u/mc/ax distributions  Digits warm and well perfused with brisk capillary refill     Assessment: 62 y o  female POD 3 ORIF L BBFF    Plan:  NWB in volar slab splint  Pain control  DVT ppx: Apixaban (Eliquis)  PT/OT  Will continue to assess for acute blood loss anemia  Dispo: Ortho will follow

## 2020-07-07 NOTE — ASSESSMENT & PLAN NOTE
- patient is now postop in signed off by Orthopedics  -continue PTA BID Eliquis 5mg  - outpatient follow-up with PCP

## 2020-07-08 PROBLEM — S52.601A RIGHT DISTAL ULNAR FRACTURE: Status: ACTIVE | Noted: 2020-07-08

## 2020-07-08 PROCEDURE — 99232 SBSQ HOSP IP/OBS MODERATE 35: CPT | Performed by: SURGERY

## 2020-07-08 PROCEDURE — NC001 PR NO CHARGE: Performed by: ORTHOPAEDIC SURGERY

## 2020-07-08 PROCEDURE — 97535 SELF CARE MNGMENT TRAINING: CPT

## 2020-07-08 RX ORDER — ONDANSETRON 4 MG/1
4 TABLET, ORALLY DISINTEGRATING ORAL EVERY 6 HOURS PRN
Status: DISCONTINUED | OUTPATIENT
Start: 2020-07-08 | End: 2020-07-09 | Stop reason: HOSPADM

## 2020-07-08 RX ADMIN — OXYCODONE HYDROCHLORIDE 5 MG: 5 TABLET ORAL at 21:24

## 2020-07-08 RX ADMIN — GABAPENTIN 300 MG: 300 CAPSULE ORAL at 21:24

## 2020-07-08 RX ADMIN — FLUVOXAMINE MALEATE 150 MG: 50 TABLET ORAL at 21:24

## 2020-07-08 RX ADMIN — LORAZEPAM 2 MG: 1 TABLET ORAL at 17:06

## 2020-07-08 RX ADMIN — DOCUSATE SODIUM 100 MG: 100 CAPSULE, LIQUID FILLED ORAL at 17:06

## 2020-07-08 RX ADMIN — DOCUSATE SODIUM 100 MG: 100 CAPSULE, LIQUID FILLED ORAL at 08:20

## 2020-07-08 RX ADMIN — LAMOTRIGINE 200 MG: 100 TABLET ORAL at 08:20

## 2020-07-08 RX ADMIN — OXYCODONE HYDROCHLORIDE 5 MG: 5 TABLET ORAL at 10:39

## 2020-07-08 RX ADMIN — ACETAMINOPHEN 975 MG: 325 TABLET, FILM COATED ORAL at 06:08

## 2020-07-08 RX ADMIN — GABAPENTIN 300 MG: 300 CAPSULE ORAL at 17:06

## 2020-07-08 RX ADMIN — OXYCODONE HYDROCHLORIDE 5 MG: 5 TABLET ORAL at 14:17

## 2020-07-08 RX ADMIN — APIXABAN 5 MG: 5 TABLET, FILM COATED ORAL at 17:06

## 2020-07-08 RX ADMIN — MEXILETINE HYDROCHLORIDE 150 MG: 150 CAPSULE ORAL at 21:24

## 2020-07-08 RX ADMIN — MEXILETINE HYDROCHLORIDE 150 MG: 150 CAPSULE ORAL at 08:20

## 2020-07-08 RX ADMIN — ONDANSETRON 4 MG: 4 TABLET, ORALLY DISINTEGRATING ORAL at 10:24

## 2020-07-08 RX ADMIN — LORAZEPAM 2 MG: 1 TABLET ORAL at 08:21

## 2020-07-08 RX ADMIN — LIDOCAINE 1 PATCH: 50 PATCH CUTANEOUS at 08:22

## 2020-07-08 RX ADMIN — MELATONIN 5000 UNITS: at 08:21

## 2020-07-08 RX ADMIN — FERROUS SULFATE TAB 325 MG (65 MG ELEMENTAL FE) 325 MG: 325 (65 FE) TAB at 08:20

## 2020-07-08 RX ADMIN — GABAPENTIN 300 MG: 300 CAPSULE ORAL at 08:20

## 2020-07-08 RX ADMIN — APIXABAN 5 MG: 5 TABLET, FILM COATED ORAL at 08:20

## 2020-07-08 RX ADMIN — OXYCODONE HYDROCHLORIDE 5 MG: 5 TABLET ORAL at 06:08

## 2020-07-08 RX ADMIN — ACETAMINOPHEN 975 MG: 325 TABLET, FILM COATED ORAL at 21:24

## 2020-07-08 RX ADMIN — ACETAMINOPHEN 975 MG: 325 TABLET, FILM COATED ORAL at 14:12

## 2020-07-08 RX ADMIN — MEXILETINE HYDROCHLORIDE 150 MG: 150 CAPSULE ORAL at 17:06

## 2020-07-08 RX ADMIN — ARIPIPRAZOLE 7.5 MG: 15 TABLET ORAL at 08:21

## 2020-07-08 NOTE — UTILIZATION REVIEW
Continued Stay Review    Date: 7/8                         Current Patient Class: Inpatient Current Level of Care: Med Surg    HPI:59 y o  female initially admitted on 7/3    Assessment/Plan: S/p Fall, Right distal ulnar fracture, Bilateral pulmonary contusion, Multiple right ribs fracture, Closed fracture of distal ends of left radius and ulna, Right arm pain   POD #4 ORIF L BBFF and non-operative treatment of right ulna fracture treated in short arm cast   Non Op, Outpatient f/u with Orthopedic  Neurovascular checks  Incentive spirometry and pulmonary toilet  Pain control  Await Insurance auth for rehab admission      Pertinent Labs/Diagnostic Results:   Results from last 7 days   Lab Units 07/03/20  1845   SARS-COV-2  Negative     Results from last 7 days   Lab Units 07/06/20  0513 07/05/20  0532 07/04/20  1249 07/04/20  0509 07/03/20  1653   WBC Thousand/uL 10 96* 10 83*  --  9 57 13 51*   HEMOGLOBIN g/dL 10 0* 10 1*  --  10 8* 11 8   I STAT HEMOGLOBIN g/dl  --   --  11 6  --   --    HEMATOCRIT % 31 1* 31 3*  --  32 9* 35 9   HEMATOCRIT, ISTAT %  --   --  34*  --   --    PLATELETS Thousands/uL 212 205  --  211 234   NEUTROS ABS Thousands/µL 6 02 6 85  --   --  10 67*         Results from last 7 days   Lab Units 07/06/20  0513 07/05/20  0532 07/04/20  1249 07/04/20  0509 07/03/20  1653   SODIUM mmol/L 143 142  --  142 141   POTASSIUM mmol/L 3 7 3 7  --  2 5* 2 5*   CHLORIDE mmol/L 109* 108  --  104 102   CO2 mmol/L 34* 32  --  33* 33*   CO2, I-STAT mmol/L  --   --  36*  --   --    ANION GAP mmol/L 0* 2*  --  5 6   BUN mg/dL 14 11  --  17 24   CREATININE mg/dL 0 66 0 63  --  0 82 1 29   EGFR ml/min/1 73sq m 96 97  --  77 45   CALCIUM mg/dL 7 9* 8 1*  --  7 8* 8 1*   CALCIUM, IONIZED, ISTAT mmol/L  --   --  1 17  --   --              Results from last 7 days   Lab Units 07/06/20  0513 07/05/20  0532 07/04/20  0509 07/03/20  1653   GLUCOSE RANDOM mg/dL 82 79 89 100       Results from last 7 days   Lab Units 07/04/20  1249   PH, LAST I-STAT  7 383   PCO2, LAST ISTAT mm HG 57 2*   PO2, LAST ISTAT mm HG 42 0   HCO3, LAST ISTAT mmol/L 34 0*   I STAT BASE EXC mmol/L 7*   I STAT O2 SAT % 76       Results from last 7 days   Lab Units 07/04/20  0509   PROTIME seconds 14 7*   INR  1 15   PTT seconds 28     Vital Signs:   None (Room air)                 07/08/20 07:40:28  98 8 °F (37 1 °C)  78  16  141/71  94  97 %         Medications:   Scheduled Medications:  Medications:  acetaminophen 975 mg Oral Q8H [address]   apixaban 5 mg Oral BID   ARIPiprazole 7 5 mg Oral Daily   cholecalciferol 5,000 Units Oral Daily   docusate sodium 100 mg Oral BID   ferrous sulfate 325 mg Oral Daily With Breakfast   fluvoxaMINE 150 mg Oral HS   gabapentin 300 mg Oral TID   HYDROmorphone 1 mg Intravenous Once   lamoTRIgine 200 mg Oral Daily   lidocaine 1 patch Topical Daily   LORazepam 2 mg Oral BID   mexiletine 150 mg Oral TID     Continuous IV Infusions: None     PRN Meds:  meclizine 12 5 mg Oral Q8H PRN   naloxone 0 04 mg Intravenous Q1MIN PRN   ondansetron 4 mg Oral Q6H PRN 7/8 x1   oxyCODONE 2 5 mg Oral Q4H PRN   oxyCODONE 5 mg Oral Q4H PRN 7/8 x3     Discharge Plan: Accepted at [facility] auth for admission for rehab  Network Utilization Review Department  [email or web address]  ATTENTION: Please call with any questions or concerns to [phone] and carefully listen to the prompts so that you are directed to the right person  All voicemails are confidential   [facility] all requests for admission clinical reviews, approved or denied determinations and any other requests to dedicated fax number below belonging to the campus where the patient is receiving treatment   List of dedicated fax numbers for the Facilities:  FACILITY NAME UR FAX NUMBER   ADMISSION DENIALS (Administrative/Medical Necessity) [phone]   [address] (Maternity/NICU/Pediatrics) [place] [address] [facility] [phone]   [place] [phone]   [place] [address] [facility] [phone]   [facility]  [phone]   [address] [facility] [address] [facility]  [address] [phone]

## 2020-07-08 NOTE — PLAN OF CARE
Problem: OCCUPATIONAL THERAPY ADULT  Goal: Performs self-care activities at highest level of function for planned discharge setting  See evaluation for individualized goals  Description  Treatment Interventions: ADL retraining, Functional transfer training, UE strengthening/ROM, Endurance training, Cognitive reorientation, Patient/family training, Equipment evaluation/education, Neuromuscular reeducation, Compensatory technique education, Continued evaluation, Activityengagement  Equipment Recommended: Tub seat with back(bariatric)       See flowsheet documentation for full assessment, interventions and recommendations  Outcome: Progressing  Note:   Limitation: Decreased ADL status, Decreased UE strength, Decreased Safe judgement during ADL, Decreased endurance, Decreased cognition, Decreased self-care trans, Decreased high-level ADLs, Decreased fine motor control  Prognosis: Fair  Assessment: pt participated in pm ot session and was seen focusing on bed mobility, ub and lb dressing clothing management and light use for b ue's ie assisting to pull up underwear/ jamal shirt/ comb hair etc  pt was able to walk with min asst x 2 and hha vs yesterday she required trunk support for balance  much improvement noted  pt was able to walk tofrom bathroom and doorway of hosp room  OT Discharge Recommendation: Post-Acute Rehabilitation Services  OT - OK to Discharge:  Yes  April A DONOVAN Sosa

## 2020-07-08 NOTE — OCCUPATIONAL THERAPY NOTE
Occupational Therapy Treatment Note:       07/08/20 5390   Restrictions/Precautions   RUE Weight Bearing Per Order NWB   LUE Weight Bearing Per Order NWB   Other Precautions Fall Risk   Pain Assessment   Pain Assessment Tool FLACC   Pain Rating: FLACC (Rest) - Face 0   Pain Rating: FLACC (Rest) - Legs 0   Pain Rating: FLACC (Rest) - Activity 0   Pain Rating: FLACC (Rest) - Cry 0   Pain Rating: FLACC (Rest) - Consolability 0   Score: FLACC (Rest) 0   Pain Rating: FLACC (Activity) - Face 0   Pain Rating: FLACC (Activity) - Legs 0   Pain Rating: FLACC (Activity) - Activity 0   Pain Rating: FLACC (Activity) - Cry 0   Pain Rating: FLACC (Activity) - Consolability 0   Score: FLACC (Activity) 0   ADL   Where Assessed Edge of bed   Grooming Assistance 3  Moderate Assistance   Grooming Comments asst for hair combing / tangle management   UB Dressing Assistance 3  Moderate Assistance   LB Dressing Assistance 3  Moderate Assistance   LB Dressing Comments max asst b footwear  pt reports not always wearing underwear and not liking to wear dresses   therefor pt will wear elastic waist pants / shorts and over head shirt without bra per pt report   Toileting Assistance  4  Minimal Assistance   Toileting Comments clothing management in s tance min asst (underwear)   Functional Standing Tolerance   Time   (fair minuns balance during functional mobility and adls)   Bed Mobility   Supine to Sit 4  Minimal assistance   Additional items Assist x 1  (increased time, hob elevated)   Transfers   Sit to Stand 3  Moderate assistance   Additional items Assist x 1   Stand to Sit 4  Minimal assistance   Additional items Assist x 1   Functional Mobility   Functional Mobility 4  Minimal assistance   Additional Comments asst x 2, ue support   Additional items Hand hold assistance   Cognition   Arousal/Participation Alert   Attention Within functional limits   Orientation Level Oriented X4   Memory Within functional limits   Comments minimal cues required not to wb / pull self oob with l hand   Activity Tolerance   Activity Tolerance Patient tolerated treatment well   Assessment   Assessment pt participated in pm ot session and was seen focusing on bed mobility, ub and lb dressing clothing management and light use for b ue's ie assisting to pull up underwear/ jamal shirt/ comb hair etc  pt was able to walk with min asst x 2 and hha vs yesterday she required trunk support for balance  much improvement noted  pt was able to walk tofrom bathroom and doorway of hosp room  Plan   Treatment Interventions ADL retraining;Functional transfer training; Endurance training;Cognitive reorientation;Patient/family training;Equipment evaluation/education; Activityengagement   Goal Expiration Date 07/15/20   OT Treatment Day 3   OT Frequency 3-5x/wk   Recommendation   OT Discharge Recommendation Post-Acute Rehabilitation Services   OT - OK to Discharge Yes   April LORIE Mann

## 2020-07-08 NOTE — PLAN OF CARE
Problem: Potential for Falls  Goal: Patient will remain free of falls  Description  INTERVENTIONS:  - Assess patient frequently for physical needs  -  Identify cognitive and physical deficits and behaviors that affect risk of falls    -  [place] fall precautions as indicated by assessment   - Educate patient/family on patient safety including physical limitations  - Instruct patient to call for assistance with activity based on assessment  - Modify environment to reduce risk of injury  - Consider OT/PT consult to assist with strengthening/mobility  Outcome: Progressing     Problem: PAIN - ADULT  Goal: Verbalizes/displays adequate comfort level or baseline comfort level  Description  Interventions:  - Encourage patient to monitor pain and request assistance  - Assess pain using appropriate pain scale  - Administer analgesics based on type and severity of pain and evaluate response  - Implement non-pharmacological measures as appropriate and evaluate response  - Consider cultural and social influences on pain and pain management  - Notify physician/advanced practitioner if interventions unsuccessful or patient reports new pain  Outcome: Progressing     Problem: INFECTION - ADULT  Goal: Absence or prevention of progression during hospitalization  Description  INTERVENTIONS:  - Assess and monitor for signs and symptoms of infection  - Monitor lab/diagnostic results  - Monitor all insertion sites, i e  indwelling lines, tubes, and drains  - Monitor endotracheal if appropriate and nasal secretions for changes in amount and color  - [place] appropriate cooling/warming therapies per order  - Administer medications as ordered  - Instruct and encourage patient and family to use good hand hygiene technique  - Identify and instruct in appropriate isolation precautions for identified infection/condition  Outcome: Progressing     Problem: SAFETY ADULT  Goal: Patient will remain free of falls  Description  INTERVENTIONS:  - Assess patient frequently for physical needs  -  Identify cognitive and physical deficits and behaviors that affect risk of falls    -  [place] fall precautions as indicated by assessment   - Educate patient/family on patient safety including physical limitations  - Instruct patient to call for assistance with activity based on assessment  - Modify environment to reduce risk of injury  - Consider OT/PT consult to assist with strengthening/mobility  Outcome: Progressing  Goal: Maintain or return to baseline ADL function  Description  INTERVENTIONS:  -  Assess patient's ability to carry out ADLs; assess patient's baseline for ADL function and identify physical deficits which impact ability to perform ADLs (bathing, care of mouth/teeth, toileting, grooming, dressing, etc )  - Assess/evaluate cause of self-care deficits   - Assess range of motion  - Assess patient's mobility; develop plan if impaired  - Assess patient's need for assistive devices and provide as appropriate  - Encourage maximum independence but intervene and supervise when necessary  - Involve family in performance of ADLs  - Assess for home care needs following discharge   - Consider OT consult to assist with ADL evaluation and planning for discharge  - Provide patient education as appropriate  Outcome: Progressing  Goal: Maintain or return mobility status to optimal level  Description  INTERVENTIONS:  - Assess patient's baseline mobility status (ambulation, transfers, stairs, etc )    - Identify cognitive and physical deficits and behaviors that affect mobility  - Identify mobility aids required to assist with transfers and/or ambulation (gait belt, sit-to-stand, lift, walker, cane, etc )  - [place] fall precautions as indicated by assessment  - Record patient progress and toleration of activity level on Mobility SBAR; progress patient to next Phase/Stage  - Instruct patient to call for assistance with activity based on assessment  - Consider rehabilitation consult to assist with strengthening/weightbearing, etc   Outcome: Progressing     Problem: DISCHARGE PLANNING  Goal: Discharge to home or other facility with appropriate resources  Description  INTERVENTIONS:  - Identify barriers to discharge w/patient and caregiver  - Arrange for needed discharge resources and transportation as appropriate  - Identify discharge learning needs (meds, wound care, etc )  - Arrange for interpretive services to assist at discharge as needed  - Refer to Case Management Department for coordinating discharge planning if the patient needs post-hospital services based on physician/advanced practitioner order or complex needs related to functional status, cognitive ability, or social support system  Outcome: Progressing     Problem: Knowledge Deficit  Goal: Patient/family/caregiver demonstrates understanding of disease process, treatment plan, medications, and discharge instructions  Description  Complete learning assessment and assess knowledge base    Interventions:  - Provide teaching at level of understanding  - Provide teaching via preferred learning methods  Outcome: Progressing     Problem: Prexisting or High Potential for Compromised Skin Integrity  Goal: Skin integrity is maintained or improved  Description  INTERVENTIONS:  - Identify patients at risk for skin breakdown  - Assess and monitor skin integrity  - Assess and monitor nutrition and hydration status  - Monitor labs   - Assess for incontinence   - Turn and reposition patient  - Assist with mobility/ambulation  - Relieve pressure over bony prominences  - Avoid friction and shearing  - Provide appropriate hygiene as needed including keeping skin clean and dry  - Evaluate need for skin moisturizer/barrier cream  - Collaborate with interdisciplinary team   - Patient/family teaching  - Consider wound care consult   Outcome: Progressing     Problem: Nutrition/Hydration-ADULT  Goal: Nutrient/Hydration intake appropriate for improving, restoring or maintaining nutritional needs  Description  Monitor and assess patient's nutrition/hydration status for malnutrition  Collaborate with interdisciplinary team and initiate plan and interventions as ordered  Monitor patient's weight and dietary intake as ordered or per policy  Utilize nutrition screening tool and intervene as necessary  Determine patient's food preferences and provide high-protein, high-caloric foods as appropriate       INTERVENTIONS:  - Monitor oral intake, urinary output, labs, and treatment plans  - Assess nutrition and hydration status and recommend course of action  - Evaluate amount of meals eaten  - Assist patient with eating if necessary   - Allow adequate time for meals  - Recommend/ encourage appropriate diets, oral nutritional supplements, and vitamin/mineral supplements  - Order, calculate, and assess calorie counts as needed  - Recommend, monitor, and adjust tube feedings and TPN/PPN based on assessed needs  - Assess need for intravenous fluids  - Provide specific nutrition/hydration education as appropriate  - Include patient/family/caregiver in decisions related to nutrition  Outcome: Progressing

## 2020-07-08 NOTE — PROCEDURES
Procedure- Orthopedics   Anatoly Zhu 62 y o  female MRN: [id number]  Unit/Bed#: [facility] 628-01    Procedure: right short arm cast    Patient was placed in a well padded short arm cast with 3 inch stockinette, webril, and fiberglass which spanned from the flexor palmar crease to mid forearm  Pt tolerated the procedure well and was neurovascularly intact both pre and post procedure  Post casting x rays were obtained and will be reviewed upon completion       Vanesa Gomez MD

## 2020-07-08 NOTE — PROGRESS NOTES
Subjective: No acute events overnight  No acute distress  Resting comfortably in bed    Objective:  A 10 point ROS was performed; negative except as noted above       Lab Results   Component Value Date/Time    WBC 10 96 (H) 07/06/2020 05:13 AM    WBC 7 66 07/07/2015 04:04 PM    HGB 10 0 (L) 07/06/2020 05:13 AM    HGB 12 3 07/07/2015 04:04 PM       Vitals:    07/07/20 2355   BP: 116/54   Pulse: 74   Resp: 18   Temp: 98 6 °F (37 °C)   SpO2: 94%     Bilateral lower extremity  Cast CDI on right, splint CDI on left  Minimal swelling in fingers bilaterally  Motor and sensory exam is intact distally to median, radial, and ulnar nerve distributions bilaterally  Toes warm and well perfused with brisk capillary refill    Assessment: 62 y o  female POD 4 ORIF L BBFF and non-operative treatment of right ulna fracture treated in short arm cast    Plan:  NWB BL UE  Pain control  DVT ppx: Apixaban (Eliquis) per primary team  PT/OT  Will continue to assess for acute blood loss anemia  Dispo: Ortho will follow

## 2020-07-08 NOTE — PROGRESS NOTES
S: patient had increasing right forearm pain for which the trauma team ordered a right forearm film which demonstrated an isolated right midshaft ulna fracture  Patient states she has numbness or tingling in the RUE    O:   Gen: NAD  HEENT: EOMI  Pulm: breathing comfortably on room air  CV: distal pulses intact  Abdomen : soft, NTND  RUE: ttp midshaft ulna, ecchymosis over forearm  Skin intact  Full ROM of elbow and wrist    Sensation intact to light touch and motor intact m/r/u distribution  2+ radial pulse    X-ray: distal 2/3 isolated ulna fracture    A/P 62year old female with left both bone forearm fracture s/p repair and right isolated ulna shaft fracture placed in short arm cast  Plan for non operative treatment for right arm  NWB B/L UE  PT/OT  Pain control  DVT ppx  Remainder of plan unchanged from orthopedic progress note dated today    Dispo: ok to discharge from an orthopedic perspective    Zaid Sheets MD

## 2020-07-08 NOTE — PROGRESS NOTES
Progress Note - Mela Duncan 1960, 62 y o  female MRN: [id number]    [facility] Encounter: [id number]    Primary Care Provider: Leanne Cleveland DO   Date and time admitted to hospital: 7/3/2020  8:01 PM        Right distal ulnar fracture  Assessment & Plan  -ortho following  -non-op  -placed in case 7/07  -OP f/u     History of pulmonary embolism  Assessment & Plan  - patient is now postop in signed off by Orthopedics  -continue PTA BID Eliquis 5mg  - outpatient follow-up with PCP    History of anxiety  Assessment & Plan  - confirmed home medication regimen through [address] as well as pharmacy  - patient takes 2 mg of Ativan twice daily  - also takes 4 mg of Ativan at night  - will have her follow up outpatient with PCP  - limit usage today of benzodiazepine to 2 mg twice daily secondary to rib fractures  - will monitor for evidence of withdrawal    Bilateral pulmonary contusion  Assessment & Plan  - repeat chest x-ray was stable  - patient is off oxygen  - out of bed to chair    Fracture of multiple ribs of right side  Assessment & Plan  - Minimally displaced right anterior 4th, 5th, 6th rib fractures without pneumothorax  - Peripheral ground-glass opacities in bilateral upper lobes, possibly from contusions versus infectious versus other inflammatory process  - Rib fracture protocol  - follow-up chest x-ray 7/4: no acute cardiopulmonary disease, acute right anterior rib fractures on CT are not visible, no PTX    Closed fracture of distal ends of left radius and ulna  Assessment & Plan  - orthopedics consulted, appreciate input  - neurovascular checks  - 7/4 to OR for ORIF of her left forearm radius and ulnar shaft fractures   - pain control, PT OT  - Patient does have high utilizer plan in place for apparent drug-seeking behavior  - orthopedics signed off    Right arm pain  Assessment & Plan  -swollen, ecchymotic, tender  -Plain film pending     * Fall down stairs  Assessment & Plan  - As below including pt/ot      Disposition: Pending, will need rehab       SUBJECTIVE:  Chief Complaint: "I have some pain in my wrists"    Subjective: States that the pain in her wrists is mild  Denies any HA, CP, SOB, abdominal pain  Has some nausea with pain medication  No vomiting         OBJECTIVE:     Meds/Allergies     Current Facility-Administered Medications:     acetaminophen (TYLENOL) tablet 975 mg, 975 mg, Oral, Q8H [address], Artur Chan MD, 975 mg at 07/08/20 0608    apixaban (ELIQUIS) tablet 5 mg, 5 mg, Oral, BID, Olga Cano PA-C, 5 mg at 07/08/20 0820    ARIPiprazole (ABILIFY) tablet 7 5 mg, 7 5 mg, Oral, Daily, Artur Chan MD, 7 5 mg at 07/08/20 9075    cholecalciferol (VITAMIN D3) tablet 5,000 Units, 5,000 Units, Oral, Daily, Artur Chan MD, 5,000 Units at 07/08/20 0411    docusate sodium (COLACE) capsule 100 mg, 100 mg, Oral, BID, Artur Chan MD, 100 mg at 07/08/20 0820    ferrous sulfate tablet 325 mg, 325 mg, Oral, Daily With Breakfast, Artur Chan MD, 325 mg at 07/08/20 0820    fluvoxaMINE (LUVOX) tablet 150 mg, 150 mg, Oral, HS, Artur Chan MD, 150 mg at 07/07/20 2142    gabapentin (NEURONTIN) capsule 300 mg, 300 mg, Oral, TID, Arutr Chan MD, 300 mg at 07/08/20 0820    HYDROmorphone (DILAUDID) injection 1 mg, 1 mg, Intravenous, Once, Penny You MD    lamoTRIgine (LaMICtal) tablet 200 mg, 200 mg, Oral, Daily, Artur Chan MD, 200 mg at 07/08/20 0820    lidocaine (LIDODERM) 5 % patch 1 patch, 1 patch, Topical, Daily, Artur Chan MD, 1 patch at 07/08/20 2473    LORazepam (ATIVAN) tablet 2 mg, 2 mg, Oral, BID, lOga Cano PA-C, 2 mg at 07/08/20 1982    meclizine (ANTIVERT) tablet 12 5 mg, 12 5 mg, Oral, Q8H PRN, Artur Chan MD, 12 5 mg at 07/05/20 1545    mexiletine (MEXITIL) capsule 150 mg, 150 mg, Oral, TID, Artur Chan MD, 150 mg at 07/08/20 0820    naloxone (NARCAN) 0 04 mg/mL syringe 0 04 mg, 0 04 mg, Intravenous, Q1MIN PRN, Matt Bass MD    ondansetron (ZOFRAN-ODT) dispersible tablet 4 mg, 4 mg, Oral, Q6H PRN, Ventura Jones MD    oxyCODONE (ROXICODONE) IR tablet 2 5 mg, 2 5 mg, Oral, Q4H PRN, 2 5 mg at 07/07/20 1441 **OR** [DISCONTINUED] oxyCODONE (ROXICODONE) IR tablet 5 mg, 5 mg, Oral, Q4H PRN, Osvaldo Sheets PA-C, 5 mg at 07/06/20 7111    oxyCODONE (ROXICODONE) IR tablet 5 mg, 5 mg, Oral, Q4H PRN, Ethan Bustamante MD, 5 mg at 07/08/20 7082     Vitals:   Vitals:    07/08/20 0740   BP: 141/71   Pulse: 78   Resp: 16   Temp: 98 8 °F (37 1 °C)   SpO2: 97%       Intake/Output:  I/O       07/06 0701 - 07/07 0700 07/07 0701 - 07/08 0700 07/08 0701 - 07/09 0700    P  O  238 1080     Total Intake(mL/kg) 238 (2 1) 1080 (9 7)     Urine (mL/kg/hr) 175 (0 1) 100 (0)     Stool  0     Total Output 175 100     Net +63 +980            Unmeasured Urine Occurrence 4 x 4 x     Unmeasured Stool Occurrence 2 x 1 x            Nutrition/GI Proph/Bowel Reg: Regular, colace     Physical Exam:   GENERAL APPEARANCE: Well appearing   NEURO: Alert, oriented  Moves all extremities where able  Denies any sensory deficits in her distal upper extremities   HEENT: Pupils equal and reactive   CV: RRR   LUNGS: CTAB   GI: soft, nontender   : deferred  MSK: LUE in splint, RUE in hard cast   SKIN: warm, dry     Invasive Devices     None                  Lab Results: None  Imaging/EKG Studies:   7/07 R forearm XR:   Mid to distal diaphyseal fracture of the right ulna  3 mm of voloradial displacement  No associated articular malalignment at the elbow or wrist      Cortical irregularity at the radial styloid is incompletely characterized but appears chronic     Suggestion of old ulnar styloid fracture, as well  No acute appearing fractures around the wrist      No significant degenerative changes      No lytic or blastic osseous lesion      Soft tissue swelling of the forearm    Small gauge peripheral IV is present at the forearm      IMPRESSION:     Mid diaphyseal fracture of the ulna with 3 mm voloradial displacement    No associated articular malalignment at the elbow or wrist       VTE Prophylaxis: Eliquis

## 2020-07-09 VITALS
SYSTOLIC BLOOD PRESSURE: 131 MMHG | HEART RATE: 73 BPM | DIASTOLIC BLOOD PRESSURE: 69 MMHG | BODY MASS INDEX: 37.09 KG/M2 | OXYGEN SATURATION: 95 % | WEIGHT: 244.71 LBS | RESPIRATION RATE: 18 BRPM | HEIGHT: 68 IN | TEMPERATURE: 98.6 F

## 2020-07-09 PROCEDURE — NC001 PR NO CHARGE: Performed by: EMERGENCY MEDICINE

## 2020-07-09 PROCEDURE — 99238 HOSP IP/OBS DSCHRG MGMT 30/<: CPT | Performed by: SURGERY

## 2020-07-09 PROCEDURE — 97116 GAIT TRAINING THERAPY: CPT

## 2020-07-09 PROCEDURE — 97110 THERAPEUTIC EXERCISES: CPT

## 2020-07-09 RX ORDER — OXYCODONE HYDROCHLORIDE 5 MG/1
2.5 TABLET ORAL EVERY 4 HOURS PRN
Qty: 15 TABLET | Refills: 0 | Status: SHIPPED | OUTPATIENT
Start: 2020-07-09 | End: 2020-07-19

## 2020-07-09 RX ORDER — LORAZEPAM 1 MG/1
2 TABLET ORAL 2 TIMES DAILY
Qty: 10 TABLET | Refills: 0 | Status: SHIPPED | OUTPATIENT
Start: 2020-07-09

## 2020-07-09 RX ORDER — GABAPENTIN 300 MG/1
300 CAPSULE ORAL 3 TIMES DAILY
Qty: 30 CAPSULE | Refills: 0 | Status: SHIPPED | OUTPATIENT
Start: 2020-07-09

## 2020-07-09 RX ADMIN — ACETAMINOPHEN 975 MG: 325 TABLET, FILM COATED ORAL at 05:50

## 2020-07-09 RX ADMIN — ARIPIPRAZOLE 7.5 MG: 15 TABLET ORAL at 08:18

## 2020-07-09 RX ADMIN — LORAZEPAM 2 MG: 1 TABLET ORAL at 08:14

## 2020-07-09 RX ADMIN — MELATONIN 5000 UNITS: at 08:14

## 2020-07-09 RX ADMIN — LIDOCAINE 1 PATCH: 50 PATCH CUTANEOUS at 08:14

## 2020-07-09 RX ADMIN — GABAPENTIN 300 MG: 300 CAPSULE ORAL at 08:14

## 2020-07-09 RX ADMIN — DOCUSATE SODIUM 100 MG: 100 CAPSULE, LIQUID FILLED ORAL at 08:13

## 2020-07-09 RX ADMIN — OXYCODONE HYDROCHLORIDE 5 MG: 5 TABLET ORAL at 14:35

## 2020-07-09 RX ADMIN — OXYCODONE HYDROCHLORIDE 5 MG: 5 TABLET ORAL at 10:08

## 2020-07-09 RX ADMIN — FERROUS SULFATE TAB 325 MG (65 MG ELEMENTAL FE) 325 MG: 325 (65 FE) TAB at 08:14

## 2020-07-09 RX ADMIN — OXYCODONE HYDROCHLORIDE 5 MG: 5 TABLET ORAL at 05:50

## 2020-07-09 RX ADMIN — MEXILETINE HYDROCHLORIDE 150 MG: 150 CAPSULE ORAL at 08:18

## 2020-07-09 RX ADMIN — ACETAMINOPHEN 975 MG: 325 TABLET, FILM COATED ORAL at 14:35

## 2020-07-09 RX ADMIN — APIXABAN 5 MG: 5 TABLET, FILM COATED ORAL at 08:14

## 2020-07-09 RX ADMIN — OXYCODONE HYDROCHLORIDE 5 MG: 5 TABLET ORAL at 02:00

## 2020-07-09 RX ADMIN — LAMOTRIGINE 200 MG: 100 TABLET ORAL at 08:14

## 2020-07-09 NOTE — PLAN OF CARE
Problem: Potential for Falls  Goal: Patient will remain free of falls  Description  INTERVENTIONS:  - Assess patient frequently for physical needs  -  Identify cognitive and physical deficits and behaviors that affect risk of falls    -  [place] fall precautions as indicated by assessment   - Educate patient/family on patient safety including physical limitations  - Instruct patient to call for assistance with activity based on assessment  - Modify environment to reduce risk of injury  - Consider OT/PT consult to assist with strengthening/mobility  Outcome: Progressing     Problem: PAIN - ADULT  Goal: Verbalizes/displays adequate comfort level or baseline comfort level  Description  Interventions:  - Encourage patient to monitor pain and request assistance  - Assess pain using appropriate pain scale  - Administer analgesics based on type and severity of pain and evaluate response  - Implement non-pharmacological measures as appropriate and evaluate response  - Consider cultural and social influences on pain and pain management  - Notify physician/advanced practitioner if interventions unsuccessful or patient reports new pain  Outcome: Progressing     Problem: INFECTION - ADULT  Goal: Absence or prevention of progression during hospitalization  Description  INTERVENTIONS:  - Assess and monitor for signs and symptoms of infection  - Monitor lab/diagnostic results  - Monitor all insertion sites, i e  indwelling lines, tubes, and drains  - Monitor endotracheal if appropriate and nasal secretions for changes in amount and color  - [place] appropriate cooling/warming therapies per order  - Administer medications as ordered  - Instruct and encourage patient and family to use good hand hygiene technique  - Identify and instruct in appropriate isolation precautions for identified infection/condition  Outcome: Progressing     Problem: SAFETY ADULT  Goal: Patient will remain free of falls  Description  INTERVENTIONS:  - Assess patient frequently for physical needs  -  Identify cognitive and physical deficits and behaviors that affect risk of falls    -  [place] fall precautions as indicated by assessment   - Educate patient/family on patient safety including physical limitations  - Instruct patient to call for assistance with activity based on assessment  - Modify environment to reduce risk of injury  - Consider OT/PT consult to assist with strengthening/mobility  Outcome: Progressing  Goal: Maintain or return to baseline ADL function  Description  INTERVENTIONS:  -  Assess patient's ability to carry out ADLs; assess patient's baseline for ADL function and identify physical deficits which impact ability to perform ADLs (bathing, care of mouth/teeth, toileting, grooming, dressing, etc )  - Assess/evaluate cause of self-care deficits   - Assess range of motion  - Assess patient's mobility; develop plan if impaired  - Assess patient's need for assistive devices and provide as appropriate  - Encourage maximum independence but intervene and supervise when necessary  - Involve family in performance of ADLs  - Assess for home care needs following discharge   - Consider OT consult to assist with ADL evaluation and planning for discharge  - Provide patient education as appropriate  Outcome: Progressing  Goal: Maintain or return mobility status to optimal level  Description  INTERVENTIONS:  - Assess patient's baseline mobility status (ambulation, transfers, stairs, etc )    - Identify cognitive and physical deficits and behaviors that affect mobility  - Identify mobility aids required to assist with transfers and/or ambulation (gait belt, sit-to-stand, lift, walker, cane, etc )  - [place] fall precautions as indicated by assessment  - Record patient progress and toleration of activity level on Mobility SBAR; progress patient to next Phase/Stage  - Instruct patient to call for assistance with activity based on assessment  - Consider rehabilitation consult to assist with strengthening/weightbearing, etc   Outcome: Progressing     Problem: DISCHARGE PLANNING  Goal: Discharge to home or other facility with appropriate resources  Description  INTERVENTIONS:  - Identify barriers to discharge w/patient and caregiver  - Arrange for needed discharge resources and transportation as appropriate  - Identify discharge learning needs (meds, wound care, etc )  - Arrange for interpretive services to assist at discharge as needed  - Refer to Case Management Department for coordinating discharge planning if the patient needs post-hospital services based on physician/advanced practitioner order or complex needs related to functional status, cognitive ability, or social support system  Outcome: Progressing     Problem: Knowledge Deficit  Goal: Patient/family/caregiver demonstrates understanding of disease process, treatment plan, medications, and discharge instructions  Description  Complete learning assessment and assess knowledge base    Interventions:  - Provide teaching at level of understanding  - Provide teaching via preferred learning methods  Outcome: Progressing     Problem: Prexisting or High Potential for Compromised Skin Integrity  Goal: Skin integrity is maintained or improved  Description  INTERVENTIONS:  - Identify patients at risk for skin breakdown  - Assess and monitor skin integrity  - Assess and monitor nutrition and hydration status  - Monitor labs   - Assess for incontinence   - Turn and reposition patient  - Assist with mobility/ambulation  - Relieve pressure over bony prominences  - Avoid friction and shearing  - Provide appropriate hygiene as needed including keeping skin clean and dry  - Evaluate need for skin moisturizer/barrier cream  - Collaborate with interdisciplinary team   - Patient/family teaching  - Consider wound care consult   Outcome: Progressing     Problem: Nutrition/Hydration-ADULT  Goal: Nutrient/Hydration intake appropriate for improving, restoring or maintaining nutritional needs  Description  Monitor and assess patient's nutrition/hydration status for malnutrition  Collaborate with interdisciplinary team and initiate plan and interventions as ordered  Monitor patient's weight and dietary intake as ordered or per policy  Utilize nutrition screening tool and intervene as necessary  Determine patient's food preferences and provide high-protein, high-caloric foods as appropriate       INTERVENTIONS:  - Monitor oral intake, urinary output, labs, and treatment plans  - Assess nutrition and hydration status and recommend course of action  - Evaluate amount of meals eaten  - Assist patient with eating if necessary   - Allow adequate time for meals  - Recommend/ encourage appropriate diets, oral nutritional supplements, and vitamin/mineral supplements  - Order, calculate, and assess calorie counts as needed  - Recommend, monitor, and adjust tube feedings and TPN/PPN based on assessed needs  - Assess need for intravenous fluids  - Provide specific nutrition/hydration education as appropriate  - Include patient/family/caregiver in decisions related to nutrition  Outcome: Progressing

## 2020-07-09 NOTE — INCIDENTAL FINDINGS
The following findings require follow up:  Radiographic finding   Findin  Peripheral groundglass opacities in the bilateral upper lobes, nonspecific, possibly pulmonary contusions and/or infectious/inflammatory  Follow up required: Yes   Follow up should be done within 2-4 week(s)    Please notify the following clinician to assist with the follow up:   Primary Care Provider  Discussed at bedside

## 2020-07-09 NOTE — PROGRESS NOTES
Progress Note - Tiff Ramachandran 1960, 62 y o  female MRN: [id number]    Unit/Bed#: [facility] 628-01 Encounter: [id number]    Primary Care Provider: Xi Guevara DO   Date and time admitted to hospital: 7/3/2020  8:01 PM        Right distal ulnar fracture  Assessment & Plan  -ortho following  -non-op  -placed in cast 7/07  -OP f/u     Fracture of multiple ribs of right side  Assessment & Plan  - Minimally displaced right anterior 4th, 5th, 6th rib fractures without pneumothorax  - Peripheral ground-glass opacities in bilateral upper lobes, possibly from contusions versus infectious versus other inflammatory process  - Rib fracture protocol  - follow-up chest x-ray 7/4: no acute cardiopulmonary disease, acute right anterior rib fractures on CT are not visible, no PTX    Closed fracture of distal ends of left radius and ulna  Assessment & Plan  - orthopedics consulted, appreciate input  - neurovascular checks  - 7/4 to OR for ORIF of her left forearm radius and ulnar shaft fractures   - pain control, PT OT  - Patient does have high utilizer plan in place for apparent drug-seeking behavior  - orthopedics signed off    Right arm pain  Assessment & Plan  -swollen, ecchymotic, tender  -Has distal right ulnar frx, placed in cast by ortho  -Decreased pain today  -Continues to be neurovascularly intact        Disposition: Pending placement to rehab       SUBJECTIVE:  Chief Complaint: "I'm doing good"     Subjective: Doing well today, did have some itchiness under her arm casts overnight but that has since resolved  States that she did better with anxiety as well  Denies any HA, CP, SOB, abd pain, n/v/d or other complaints  Pain is well controlled at this time        OBJECTIVE:     Meds/Allergies     Current Facility-Administered Medications:     acetaminophen (TYLENOL) tablet 975 mg, 975 mg, Oral, Q8H [address], Ambrose Farias MD, 975 mg at 07/09/20 0550    apixaban (ELIQUIS) tablet 5 mg, 5 mg, Oral, BID, Kia Duggan SIRISHA, 5 mg at 07/09/20 0814    ARIPiprazole (ABILIFY) tablet 7 5 mg, 7 5 mg, Oral, Daily, Willie Dickens MD, 7 5 mg at 07/09/20 0818    cholecalciferol (VITAMIN D3) tablet 5,000 Units, 5,000 Units, Oral, Daily, Willie Dickens MD, 5,000 Units at 07/09/20 1313    docusate sodium (COLACE) capsule 100 mg, 100 mg, Oral, BID, Willie Dickens MD, 100 mg at 07/09/20 3839    ferrous sulfate tablet 325 mg, 325 mg, Oral, Daily With Breakfast, Willie Dickens MD, 325 mg at 07/09/20 0814    fluvoxaMINE (LUVOX) tablet 150 mg, 150 mg, Oral, HS, Willie Dickens MD, 150 mg at 07/08/20 2124    gabapentin (NEURONTIN) capsule 300 mg, 300 mg, Oral, TID, Willie Dickens MD, 300 mg at 07/09/20 8348    HYDROmorphone (DILAUDID) injection 1 mg, 1 mg, Intravenous, Once, Kaylan Thorne MD    lamoTRIgine (LaMICtal) tablet 200 mg, 200 mg, Oral, Daily, Willie Dickens MD, 200 mg at 07/09/20 0814    lidocaine (LIDODERM) 5 % patch 1 patch, 1 patch, Topical, Daily, Willie Dickens MD, 1 patch at 07/09/20 7671    LORazepam (ATIVAN) tablet 2 mg, 2 mg, Oral, BID, Nancy Vora PA-C, 2 mg at 07/09/20 5833    meclizine (ANTIVERT) tablet 12 5 mg, 12 5 mg, Oral, Q8H PRN, Willie Dickens MD, 12 5 mg at 07/05/20 1545    mexiletine (MEXITIL) capsule 150 mg, 150 mg, Oral, TID, Willie Dickens MD, 150 mg at 07/09/20 0818    naloxone (NARCAN) 0 04 mg/mL syringe 0 04 mg, 0 04 mg, Intravenous, Q1MIN PRN, Willie Dickens MD    ondansetron (ZOFRAN-ODT) dispersible tablet 4 mg, 4 mg, Oral, Q6H PRN, Miguelina Arzate MD, 4 mg at 07/08/20 1024    oxyCODONE (ROXICODONE) IR tablet 2 5 mg, 2 5 mg, Oral, Q4H PRN, 2 5 mg at 07/07/20 1441 **OR** [DISCONTINUED] oxyCODONE (ROXICODONE) IR tablet 5 mg, 5 mg, Oral, Q4H PRN, Nancy Vora PA-C, 5 mg at 07/06/20 8071    oxyCODONE (ROXICODONE) IR tablet 5 mg, 5 mg, Oral, Q4H PRN, Kaylan Thorne MD, 5 mg at 07/09/20 0550     Vitals:   Vitals:    07/09/20 0700   BP: 131/69   Pulse: 73   Resp: 18   Temp: 98 6 °F (37 °C)   SpO2: 95%       Intake/Output:  I/O       07/07 0701 - 07/08 0700 07/08 0701 - 07/09 0700 07/09 0701 - 07/10 0700    P  O  1740 1290     Total Intake(mL/kg) 1740 (15 7) 1290 (11 6)     Urine (mL/kg/hr) 100 (0) 775 (0 3)     Stool 0      Total Output 100 775     Net +1640 +515            Unmeasured Urine Occurrence 4 x 5 x     Unmeasured Stool Occurrence 1 x             Nutrition/GI Proph/Bowel Reg: regular house  Colace  Physical Exam:   GENERAL APPEARANCE: Well appearing, sitting up in bed   NEURO: Alert, oriented  Moves b/l fingers, has normal sensation in exposed fingers distal to cast/splint     HEENT: Atraumatic   CV: Systolic murmur, RRR    LUNGS: CTAB   GI: soft, nontender   : deferred  MSK: LUE in splint, RUE in cast   SKIN: dry, warm     Invasive Devices     None                  Lab Results: No new  Imaging/EKG Studies: No new    VTE Prophylaxis: Home eliquis

## 2020-07-09 NOTE — PHYSICAL THERAPY NOTE
PHYSICAL THERAPY NOTE    Patient Name: Jennie GARDINER Date: 7/9/2020 07/09/20 1138   Pain Assessment   Pain Assessment Tool 0-10   Pain Score 7   Pain Location/Orientation Orientation: Bilateral;Location: Arm   Hospital Pain Intervention(s) Repositioned; Ambulation/increased activity; Emotional support   Restrictions/Precautions   Weight Bearing Precautions Per Order Yes   RUE Weight Bearing Per Order NWB   LUE Weight Bearing Per Order NWB   Other Precautions WBS; Fall Risk;Pain   General   Chart Reviewed Yes   Response to Previous Treatment Patient with no complaints from previous session  Family/Caregiver Present No   Cognition   Overall Cognitive Status Impaired   Arousal/Participation Alert   Attention Within functional limits   Orientation Level Oriented X4   Memory Within functional limits   Following Commands Follows one step commands without difficulty   Comments Pt requires multiple cues to maintain NWB on b/l UEs   Subjective   Subjective "You mean I can't use either of my arms?"   Bed Mobility   Supine to Sit Unable to assess   Sit to Supine 3  Moderate assistance   Additional items Assist x 1; Increased time required;Verbal cues;LE management   Additional Comments Pt seated OOB upon PT arrival  Pt returned lying supine at end of session w/ all needs within reach   Transfers   Sit to Stand 3  Moderate assistance   Additional items Assist x 1; Increased time required;Verbal cues   Stand to Sit 3  Moderate assistance   Additional items Assist x 1; Increased time required;Verbal cues   Additional Comments Transfers w/out AD   Ambulation/Elevation   Gait pattern Excessively slow; Short stride; Improper Weight shift;Decreased foot clearance;Shuffling   Gait Assistance 4  Minimal assist   Additional items Assist x 1;Verbal cues   Assistive Device None   Distance 50ft x2   Balance   Static Sitting Good   Dynamic Sitting Fair   Static Standing Fair -   Dynamic Standing Poor +   Ambulatory Poor +   Endurance Deficit   Endurance Deficit Yes   Endurance Deficit Description weakness, fatigue, pain   Activity Tolerance   Activity Tolerance Patient limited by fatigue;Patient limited by pain   Nurse Made Aware yes   Exercises   Hip Abduction Sitting;Bilateral;15 reps   Knee AROM Long Arc Quad Sitting;Bilateral;15 reps   Ankle Pumps Sitting;Bilateral;15 reps   Marching Sitting;Bilateral;15 reps   Assessment   Prognosis Good   Problem List Decreased strength;Decreased range of motion;Decreased endurance; Impaired balance;Decreased mobility; Decreased safety awareness;Pain;Orthopedic restrictions   Assessment Pt presents with decreased mobility, strength, balance, and activity tolerance  Pt demonstrated ability to perform bed mobility at Mod A  Pt performed STS at Mod A; pt performed ~3 STS throughout session  Pt requires cues to maintain NWB b/l UE throughout transfers  Pt ambulated 50ftx2 with no AD at [address]  Pt presents w/ shuffling gait pattern  Pt performed seated LE therex  Pt demonstrates improvement in therapy as compared to previous session requiring Ax1 for all transfers and mobility and being able to tolerate longer ambulation distances  Pt continues to benefit from skilled therapy to maximize functional independence  Recommendation at this time is rehab  Pt would benefit from continued ambulation, functional transfers, strength, balance, and activity tolerance   Barriers to Discharge Decreased caregiver support   Goals   Patient Goals to go to rehab   STG Expiration Date 07/15/20   PT Treatment Day 1   Plan   Treatment/Interventions Functional transfer training;LE strengthening/ROM; Therapeutic exercise; Endurance training;Patient/family training;Equipment eval/education; Bed mobility;Gait training;Spoke to nursing   Progress Progressing toward goals   PT Frequency   (3-6x/week)   Recommendation   PT Discharge Recommendation Post-Acute Rehabilitation Services   PT - OK to Discharge Yes  (when medically cleared to rehab)     Raquel Palencia, PT, DPT

## 2020-07-09 NOTE — PLAN OF CARE
Problem: PHYSICAL THERAPY ADULT  Goal: Performs mobility at highest level of function for planned discharge setting  See evaluation for individualized goals  Description  Treatment/Interventions: Functional transfer training, Therapeutic exercise, Elevations, LE strengthening/ROM, Endurance training, Cognitive reorientation, Patient/family training, Bed mobility, Gait training  Equipment Recommended: (Platform walker )       See flowsheet documentation for full assessment, interventions and recommendations  Outcome: Progressing  Note:   Prognosis: Good  Problem List: Decreased strength, Decreased range of motion, Decreased endurance, Impaired balance, Decreased mobility, Decreased safety awareness, Pain, Orthopedic restrictions  Assessment: Pt presents with decreased mobility, strength, balance, and activity tolerance  Pt demonstrated ability to perform bed mobility at Mod A  Pt performed STS at Mod A; pt performed ~3 STS throughout session  Pt requires cues to maintain NWB b/l UE throughout transfers  Pt ambulated 50ftx2 with no AD at [address]  Pt presents w/ shuffling gait pattern  Pt performed seated LE therex  Pt demonstrates improvement in therapy as compared to previous session requiring Ax1 for all transfers and mobility and being able to tolerate longer ambulation distances  Pt continues to benefit from skilled therapy to maximize functional independence  Recommendation at this time is rehab  Pt would benefit from continued ambulation, functional transfers, strength, balance, and activity tolerance  Barriers to Discharge: Decreased caregiver support     PT Discharge Recommendation: Post-Acute Rehabilitation Services     PT - OK to Discharge: Yes(when medically cleared to rehab)    See flowsheet documentation for full assessment

## 2020-07-09 NOTE — DISCHARGE INSTRUCTIONS
Discharge Instructions - Orthopedics  Michael Voss 62 y o  female MRN: [id number]  Unit/Bed#: [facility] 628-01    Weight Bearing Status:                                           Nonweightbearing in the left upper extremity in splint    Dressing Instructions:   Please keep clean, dry and intact until follow up     Appt Instructions: If you do not have your appointment, please call the clinic at [phone] to follow up in 2 weeks after surgery with Dr Yohannes Ramirez  Otherwise followup as scheduled     Rib Fractures:  Seek medical attn if you develop worsening chest pain or shortness of breath, dizziness/lightheadness, fevers/chills or sweats  No heavy lifting, pushing or pulling >10 pounds  No strenuous physical activity  No work or driving while taking narcotic pain medications and until cleared by trauma  Narcotic Pain medication regimen:   Take narcotic pain medications only as prescribed  Consult with your doctor prior to starting a new medication while on narcotic pain medications  Do not drink alcohol while taking narcotic pain medications  No working, driving, or hazardous activity while taking narcotics  If you need to request a refill, please contact the office 48 hours prior to running out of your medications  Benzodiazepine usage  - patient is on a considerable amount of benzodiazepines  - while at facility can look to titrate up if appropriate; however patient has only been on 2 mg b i d  [facility]  - appears that patient takes 2 mg b i d  During the day and 4 mg at night; this was deferred secondary to patient respiratory status in the setting of acute rib fractures    - would recommend close follow-up with her primary psychiatrist and close observation at facility  - patient has had no episodes or symptoms of withdrawal

## 2020-07-09 NOTE — SOCIAL WORK
Pt was approved for rehab  [phone] is reference number   Next review on 7/14/20  Have to set up a new auth when this date occurs  Call [address] made aware  They're requesting transport set for a later time (any time after 1500pm)     [address] [facility] @[id number]  Pt and her nurse made aware and in agreement

## 2020-07-09 NOTE — PLAN OF CARE
Problem: Potential for Falls  Goal: Patient will remain free of falls  Description  INTERVENTIONS:  - Assess patient frequently for physical needs  -  Identify cognitive and physical deficits and behaviors that affect risk of falls    -  [place] fall precautions as indicated by assessment   - Educate patient/family on patient safety including physical limitations  - Instruct patient to call for assistance with activity based on assessment  - Modify environment to reduce risk of injury  - Consider OT/PT consult to assist with strengthening/mobility  7/9/2020 1522 by Dea Calle RN  Outcome: Adequate for Discharge  7/9/2020 0733 by Dea Calle RN  Outcome: Progressing     Problem: PAIN - ADULT  Goal: Verbalizes/displays adequate comfort level or baseline comfort level  Description  Interventions:  - Encourage patient to monitor pain and request assistance  - Assess pain using appropriate pain scale  - Administer analgesics based on type and severity of pain and evaluate response  - Implement non-pharmacological measures as appropriate and evaluate response  - Consider cultural and social influences on pain and pain management  - Notify physician/advanced practitioner if interventions unsuccessful or patient reports new pain  7/9/2020 1522 by Dea Calle RN  Outcome: Adequate for Discharge  7/9/2020 0733 by Dea Calle RN  Outcome: Progressing     Problem: INFECTION - ADULT  Goal: Absence or prevention of progression during hospitalization  Description  INTERVENTIONS:  - Assess and monitor for signs and symptoms of infection  - Monitor lab/diagnostic results  - Monitor all insertion sites, i e  indwelling lines, tubes, and drains  - Monitor endotracheal if appropriate and nasal secretions for changes in amount and color  - [place] appropriate cooling/warming therapies per order  - Administer medications as ordered  - Instruct and encourage patient and family to use good hand hygiene technique  - Identify and instruct in appropriate isolation precautions for identified infection/condition  7/9/2020 1522 by Aman Zheng RN  Outcome: Adequate for Discharge  7/9/2020 9743 by Aman Zheng RN  Outcome: Progressing     Problem: SAFETY ADULT  Goal: Patient will remain free of falls  Description  INTERVENTIONS:  - Assess patient frequently for physical needs  -  Identify cognitive and physical deficits and behaviors that affect risk of falls    -  [place] fall precautions as indicated by assessment   - Educate patient/family on patient safety including physical limitations  - Instruct patient to call for assistance with activity based on assessment  - Modify environment to reduce risk of injury  - Consider OT/PT consult to assist with strengthening/mobility  7/9/2020 1522 by Aman Zheng RN  Outcome: Adequate for Discharge  7/9/2020 0733 by Aman Zheng RN  Outcome: Progressing  Goal: Maintain or return to baseline ADL function  Description  INTERVENTIONS:  -  Assess patient's ability to carry out ADLs; assess patient's baseline for ADL function and identify physical deficits which impact ability to perform ADLs (bathing, care of mouth/teeth, toileting, grooming, dressing, etc )  - Assess/evaluate cause of self-care deficits   - Assess range of motion  - Assess patient's mobility; develop plan if impaired  - Assess patient's need for assistive devices and provide as appropriate  - Encourage maximum independence but intervene and supervise when necessary  - Involve family in performance of ADLs  - Assess for home care needs following discharge   - Consider OT consult to assist with ADL evaluation and planning for discharge  - Provide patient education as appropriate  7/9/2020 1522 by Aman Zheng RN  Outcome: Adequate for Discharge  7/9/2020 0733 by Aman Zheng RN  Outcome: Progressing  Goal: Maintain or return mobility status to optimal level  Description  INTERVENTIONS:  - Assess patient's baseline mobility status (ambulation, transfers, stairs, etc )    - Identify cognitive and physical deficits and behaviors that affect mobility  - Identify mobility aids required to assist with transfers and/or ambulation (gait belt, sit-to-stand, lift, walker, cane, etc )  - [place] fall precautions as indicated by assessment  - Record patient progress and toleration of activity level on Mobility SBAR; progress patient to next Phase/Stage  - Instruct patient to call for assistance with activity based on assessment  - Consider rehabilitation consult to assist with strengthening/weightbearing, etc   7/9/2020 1522 by Anjali Aguilera RN  Outcome: Adequate for Discharge  7/9/2020 0733 by Anjali Aguilera RN  Outcome: Progressing     Problem: DISCHARGE PLANNING  Goal: Discharge to home or other facility with appropriate resources  Description  INTERVENTIONS:  - Identify barriers to discharge w/patient and caregiver  - Arrange for needed discharge resources and transportation as appropriate  - Identify discharge learning needs (meds, wound care, etc )  - Arrange for interpretive services to assist at discharge as needed  - Refer to Case Management Department for coordinating discharge planning if the patient needs post-hospital services based on physician/advanced practitioner order or complex needs related to functional status, cognitive ability, or social support system  7/9/2020 1522 by Anjali Aguilera RN  Outcome: Adequate for Discharge  7/9/2020 0733 by Anjali Aguilera RN  Outcome: Progressing     Problem: Knowledge Deficit  Goal: Patient/family/caregiver demonstrates understanding of disease process, treatment plan, medications, and discharge instructions  Description  Complete learning assessment and assess knowledge base    Interventions:  - Provide teaching at level of understanding  - Provide teaching via preferred learning methods  7/9/2020 1522 by Anjali Aguilera RN  Outcome: Adequate for Discharge  7/9/2020 6441 by Anjali Aguilera RN  Outcome: Progressing     Problem: Prexisting or High Potential for Compromised Skin Integrity  Goal: Skin integrity is maintained or improved  Description  INTERVENTIONS:  - Identify patients at risk for skin breakdown  - Assess and monitor skin integrity  - Assess and monitor nutrition and hydration status  - Monitor labs   - Assess for incontinence   - Turn and reposition patient  - Assist with mobility/ambulation  - Relieve pressure over bony prominences  - Avoid friction and shearing  - Provide appropriate hygiene as needed including keeping skin clean and dry  - Evaluate need for skin moisturizer/barrier cream  - Collaborate with interdisciplinary team   - Patient/family teaching  - Consider wound care consult   7/9/2020 1522 by Anjali Aguilera RN  Outcome: Adequate for Discharge  7/9/2020 0733 by Anjali Aguilera RN  Outcome: Progressing     Problem: Nutrition/Hydration-ADULT  Goal: Nutrient/Hydration intake appropriate for improving, restoring or maintaining nutritional needs  Description  Monitor and assess patient's nutrition/hydration status for malnutrition  Collaborate with interdisciplinary team and initiate plan and interventions as ordered  Monitor patient's weight and dietary intake as ordered or per policy  Utilize nutrition screening tool and intervene as necessary  Determine patient's food preferences and provide high-protein, high-caloric foods as appropriate       INTERVENTIONS:  - Monitor oral intake, urinary output, labs, and treatment plans  - Assess nutrition and hydration status and recommend course of action  - Evaluate amount of meals eaten  - Assist patient with eating if necessary   - Allow adequate time for meals  - Recommend/ encourage appropriate diets, oral nutritional supplements, and vitamin/mineral supplements  - Order, calculate, and assess calorie counts as needed  - Recommend, monitor, and adjust tube feedings and TPN/PPN based on assessed needs  - Assess need for intravenous fluids  - Provide specific nutrition/hydration education as appropriate  - Include patient/family/caregiver in decisions related to nutrition  7/9/2020 1522 by Jermaine Marmolejo RN  Outcome: Adequate for Discharge  7/9/2020 0733 by Jermaine Marmolejo, RN  Outcome: Progressing

## 2020-07-09 NOTE — ASSESSMENT & PLAN NOTE
-swollen, ecchymotic, tender  -Has distal right ulnar frx, placed in cast by ortho  -Decreased pain today  -Continues to be neurovascularly intact

## 2020-07-09 NOTE — DISCHARGE SUMMARY
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 who presents with as a trauma transfer after she was found to have 3 right rib fractures as well as a left distal radius/ulna shaft fracture after falling down about 15 steps today  Per EMS, the patient was found by her   She had recently been admitted to the hospital for syncope and hypokalemia  Denies any loss of consciousness but does believe that she hit her head  Tetanus up-to-date  Currently the patient complains of diffuse pain throughout her body  There is no sharp shooting pain in her neck  Mechanism:Fall"    Procedures Performed:   Orders Placed This Encounter   Procedures    Fast Ultrasound       Summary of Hospital Course:  Patient is a 59-year-old female who comes in for evaluation status post fall  She was noted to have a left elbow fracture that was repaired in the OR by Orthopedics after consultation  She was noted to have a right forearm fracture as well  She was noted to have multiple rib fractures  Repeat chest x-ray was stable  During her hospital course she cleared PT and OT for rehab  She was restarted on her home medications including Eliquis  She was also noted to have a longstanding history of utilizing Ativan  Her home medication dosage of Ativan was started at 2 mg b i d  She was not resumed on her 4 mg at night secondary to concerns of respiratory status in the setting of rib fractures    He was instructed on her discharge instructions that she can slowly up titrate back to her normal dosage if the facility feels necessary based on patient's symptoms as well as she can follow up outpatient with her psychiatrist to determine further goals of care in terms of benzodiazepine usage  Would not recommend any refills on any narcotics past the short script she was given upon discharge to facility  Significant Findings, Care, Treatment and Services Provided: Xr Chest Pa & Lateral    Result Date: 7/4/2020  Impression: No acute cardiopulmonary disease  Acute right anterior rib fractures on CT are not visible  No pneumothorax  Workstation performed: [id number]     Xr Forearm 2 Vw Left    Result Date: 7/4/2020  Impression: Procedure guidance  Please refer to the separate procedure notes for additional details  Workstation performed: [id number]     Xr Forearm 2 Views Left    Result Date: 7/4/2020  Impression: Transversely oriented displaced and overriding fractures of the distal radial and ulnar shafts  Workstation performed: [id number]     Xr Forearm 2 Vw Right    Result Date: 7/8/2020  Impression: Fracture distal ulna  Workstation performed: [id number]     Xr Forearm 2 Vw Right    Result Date: 7/7/2020  Impression: Near-anatomic alignment of the right ulnar shaft following closed reduction and splinting  Side-port Workstation performed: [id number]     Xr Forearm 2 Vw Right    Result Date: 7/7/2020  Impression: Mid diaphyseal fracture of the ulna with 3 mm voloradial displacement  No associated articular malalignment at the elbow or wrist  Workstation performed: [id number]     Xr Wrist 3+ Views Left    Result Date: 7/4/2020  Impression: Transversely oriented displaced and overriding fractures of the distal radial and ulnar shafts  Workstation performed: [id number]     Xr Hand 2 Vw Left    Result Date: 7/4/2020  Impression: Overlying splint obscuring fine detail  Markedly displaced distal radial and ulnar fractures, partially visualized   Workstation performed: [id number]     Trauma - Ct Head Wo Contrast    Result Date: 7/3/2020  Impression: 1  No acute intracranial abnormality  2   Midline high parietal scalp hematoma without a calvarial fracture  Workstation performed: [id number]     Trauma - Ct Spine Cervical Wo Contrast    Result Date: 7/3/2020  Impression: No cervical spine fracture or traumatic malalignment  Workstation performed: [id number]     Trauma - Ct Chest Abdomen Pelvis W Contrast    Result Date: 7/3/2020  Impression: 1  Acute minimally displaced right anterior 4th, 5th, and 6th rib fractures  No pneumothorax  2   Acute minimally displaced right lateral osteophyte fracture of the T9 vertebral body  3   Peripheral groundglass opacities in the bilateral upper lobes, nonspecific, possibly pulmonary contusions and/or infectious/inflammatory  4   No acute traumatic injury in the abdomen or pelvis  No acute osseous abnormality in the lumbar spine  5   Incidentally imaged acute displaced and angulated left radial and ulnar distal shaft fractures  The study was marked in [facility] for immediate notification  Workstation performed: [id number]     Ct Recon Only Lumbar Spine (no Charge)    Result Date: 7/3/2020  Impression: 1  Acute minimally displaced right anterior 4th, 5th, and 6th rib fractures  No pneumothorax  2   Acute minimally displaced right lateral osteophyte fracture of the T9 vertebral body  3   Peripheral groundglass opacities in the bilateral upper lobes, nonspecific, possibly pulmonary contusions and/or infectious/inflammatory  4   No acute traumatic injury in the abdomen or pelvis  No acute osseous abnormality in the lumbar spine  5   Incidentally imaged acute displaced and angulated left radial and ulnar distal shaft fractures  The study was marked in [facility] for immediate notification  Workstation performed: [id number]     Ct Recon Only Thoracic Spine (no Charge)    Result Date: 7/3/2020  Impression: 1  Acute minimally displaced right anterior 4th, 5th, and 6th rib fractures  No pneumothorax   2   Acute minimally displaced right lateral osteophyte fracture of the T9 vertebral body  3   Peripheral groundglass opacities in the bilateral upper lobes, nonspecific, possibly pulmonary contusions and/or infectious/inflammatory  4   No acute traumatic injury in the abdomen or pelvis  No acute osseous abnormality in the lumbar spine  5   Incidentally imaged acute displaced and angulated left radial and ulnar distal shaft fractures  The study was marked in [facility] for immediate notification  Workstation performed: [id number]       Complications: no complications    Discharge Diagnosis:   1  Right Ulnar Fracture  2  Multiple Fractures of Ribs on right side  3  Closed fracture of left radius and ulna     Resolved Problems  Date Reviewed: 7/7/2020          Resolved    T9 vertebral fracture ([address] ) 7/7/2020     Resolved by  MOON Palmer          Condition at Discharge: good         Discharge instructions/Information to patient and family:   See after visit summary for information provided to patient and family  Provisions for Follow-Up Care:  See after visit summary for information related to follow-up care and any pertinent home health orders  PCP: Analia Collins DO    Disposition: Rehab    Planned Readmission: No      Discharge Statement   I spent 27 minutes discharging the patient  This time was spent on the day of discharge  I had direct contact with the patient on the day of discharge  Additional documentation is required if more than 30 minutes were spent on discharge  Discharge Medications:  See after visit summary for reconciled discharge medications provided to patient and family

## 2020-07-10 NOTE — UTILIZATION REVIEW
Notification of Discharge  This is a Notification of Discharge from our facility [address]  Please be advised that this patient has been discharge from our facility  Below you will find the admission and discharge date and time including the patients disposition  PRESENTATION DATE: 7/3/2020  8:01 PM  OBS ADMISSION DATE:   IP ADMISSION DATE: 7/3/20 2055   DISCHARGE DATE: 7/9/2020  3:23 PM  DISPOSITION: Non [facility] SN[facility]/S[facility] Non [facility] SN[facility]/SNU   Admission Orders listed below:  Admission Orders (From admission, onward)     Ordered        07/03/20 2059  Inpatient Admission  Once                   Please contact the UR Department if additional information is required to close this patient's authorization/case  [address] Utilization Review Department  Main: [phone] x carefully listen to the prompts  All voicemails are confidential   [email or web address]  Send all requests for admission clinical reviews, approved or denied determinations and any other requests to dedicated fax number below belonging to the campus where the patient is receiving treatment   List of dedicated fax numbers:  [address] DENIALS (Administrative/Medical Necessity) [phone]   [address]  (Maternity/NICU/Pediatrics) [phone] [address] [facility] [phone]     [address] [phone]   [address] [place] [address] [phone]   [facility] [address] [facility] [phone]   [facility]  [phone]   [address] [facility] [address]  [address] [facility] [address] [facility] [phone]

## 2020-07-13 ENCOUNTER — PATIENT OUTREACH (OUTPATIENT)
Dept: CASE MANAGEMENT | Facility: OTHER | Age: 60
End: 2020-07-13

## 2020-07-13 NOTE — PROGRESS NOTES
Outpatient Care Management   At the current time, this patient is being followed by Integrated Care Coordination  I will follow patient in surveillance at this time

## 2020-07-21 ENCOUNTER — TELEPHONE (OUTPATIENT)
Dept: OBGYN CLINIC | Facility: HOSPITAL | Age: 60
End: 2020-07-21

## 2020-07-21 NOTE — PROGRESS NOTES
Assessment:   S/P Open reduction and internal fixation left radius and ulnar shaft fracture - Left and Application short-arm splint - Left on 7/4/2020   R distal ulnar shaft fx casted 7/7/20    Plan:   Xrays reviewed  Therapy to work on ROM, no strengthening for the L side  5lb weight limit  No brace    R side - c/w cast     Follow Up:  6  week(s)    To Do Next Visit:  X-rays of the  left  And right forearm      CHIEF COMPLAINT:  No chief complaint on file  SUBJECTIVE:  Ian Reed is a 62 y o  female who presents for follow up after Open reduction and internal fixation left radius and ulnar shaft fracture - Left and Application short-arm splint - Left on 7/4/2020  Pt also being treated for nonop R ulnar shaft fx, casted on 7/7/20  Pt states overall she is doing well  Has occasional burning, but denies n/t  PHYSICAL EXAMINATION:  Vital signs: There were no vitals taken for this visit  General: well developed and well nourished, alert, oriented times 3 and appears comfortable  Psychiatric: Normal    MUSCULOSKELETAL EXAMINATION:  Incision: Clean, dry, intact  Range of Motion: As expected and full composite fist possible  Pt with expected LROM L wrist  Pronation full, supination limited approx 50%  Neurovascular status: Neuro intact and radial pulse 2+  Done today: Sutures out and Steri strips applied      STUDIES REVIEWED:  Images were reviewed in PACS by myself and demonstrate: Well aligned both bone forearm fxs on the L s/p ORIF  No evidence of hardware failure   R side shows minimally displaced ulnar shaft fx      PROCEDURES PERFORMED:  Procedures  No Procedures performed today

## 2020-07-21 NOTE — TELEPHONE ENCOUNTER
Left voice mail message for patient to call us back  If the patient calls back please do COVId screening questions and document on the appointment line

## 2020-07-22 ENCOUNTER — HOSPITAL ENCOUNTER (OUTPATIENT)
Dept: RADIOLOGY | Facility: HOSPITAL | Age: 60
Discharge: HOME | End: 2020-07-22
Payer: COMMERCIAL

## 2020-07-22 ENCOUNTER — OFFICE VISIT (OUTPATIENT)
Dept: OBGYN CLINIC | Facility: HOSPITAL | Age: 60
End: 2020-07-22

## 2020-07-22 VITALS
DIASTOLIC BLOOD PRESSURE: 84 MMHG | HEIGHT: 68 IN | SYSTOLIC BLOOD PRESSURE: 133 MMHG | HEART RATE: 96 BPM | BODY MASS INDEX: 37.59 KG/M2 | WEIGHT: 248 LBS

## 2020-07-22 DIAGNOSIS — T14.8XXA FRACTURE: ICD-10-CM

## 2020-07-22 DIAGNOSIS — Z98.890 S/P ORIF (OPEN REDUCTION INTERNAL FIXATION) FRACTURE: ICD-10-CM

## 2020-07-22 DIAGNOSIS — Z87.81 S/P ORIF (OPEN REDUCTION INTERNAL FIXATION) FRACTURE: ICD-10-CM

## 2020-07-22 DIAGNOSIS — S52.231A CLOSED DISPLACED OBLIQUE FRACTURE OF SHAFT OF RIGHT ULNA, INITIAL ENCOUNTER: Primary | ICD-10-CM

## 2020-07-22 PROCEDURE — 73090 X-RAY EXAM OF FOREARM: CPT

## 2020-07-22 PROCEDURE — 99024 POSTOP FOLLOW-UP VISIT: CPT | Performed by: ORTHOPAEDIC SURGERY

## 2020-07-27 ENCOUNTER — TELEPHONE (OUTPATIENT)
Dept: OBGYN CLINIC | Facility: HOSPITAL | Age: 60
End: 2020-07-27

## 2020-07-27 DIAGNOSIS — Z98.890 S/P ORIF (OPEN REDUCTION INTERNAL FIXATION) FRACTURE: Primary | ICD-10-CM

## 2020-07-27 DIAGNOSIS — R42 DIZZINESS: ICD-10-CM

## 2020-07-27 DIAGNOSIS — Z87.81 S/P ORIF (OPEN REDUCTION INTERNAL FIXATION) FRACTURE: Primary | ICD-10-CM

## 2020-07-27 RX ORDER — OXYCODONE HYDROCHLORIDE 5 MG/1
TABLET ORAL
Qty: 15 TABLET | Refills: 0 | Status: SHIPPED | OUTPATIENT
Start: 2020-07-27 | End: 2020-09-09

## 2020-07-27 RX ORDER — ONDANSETRON 4 MG/1
TABLET, FILM COATED ORAL
Qty: 60 TABLET | Refills: 2 | OUTPATIENT
Start: 2020-07-27

## 2020-07-27 NOTE — TELEPHONE ENCOUNTER
Ah ok, I apologize they were bot being listed as rehab but as their outpatient facilities  Sent refill  We are foing to start weaning down the dose and frequency  Pt should take least amount needed   Use tylenol in adddition to decrease need for narcotic

## 2020-07-27 NOTE — TELEPHONE ENCOUNTER
So when I looked at [facility] in the patient's chart, it looks like she has been getting her narcotic medication from a practice in [facility]  I would suggest she continue with this provider as narcotic medications should only be prescribed by one practice

## 2020-07-27 NOTE — TELEPHONE ENCOUNTER
Spoke to pt  Who stated she was prescribed this medication while in [facility] she never came home with a script and then was prescribed this for pain in the hospital

## 2020-07-27 NOTE — TELEPHONE ENCOUNTER
Patient sees Dr Glenroy Fischer     Patient called in requesting pain medication be sent top the [facility] on file          [phone]

## 2020-07-27 NOTE — TELEPHONE ENCOUNTER
Can we clarify please which med she is requesting? She was seen by trauma as well so just want to make sure it wasn't a medication they're handling  She can definitely take Tylenol for athritis 650mg TID

## 2020-07-30 ENCOUNTER — OFFICE VISIT (OUTPATIENT)
Dept: SURGERY | Facility: CLINIC | Age: 60
End: 2020-07-30
Payer: COMMERCIAL

## 2020-07-30 VITALS
WEIGHT: 231.2 LBS | HEIGHT: 68 IN | DIASTOLIC BLOOD PRESSURE: 74 MMHG | TEMPERATURE: 98.8 F | HEART RATE: 106 BPM | SYSTOLIC BLOOD PRESSURE: 116 MMHG | BODY MASS INDEX: 35.04 KG/M2

## 2020-07-30 DIAGNOSIS — S22.41XD CLOSED FRACTURE OF MULTIPLE RIBS OF RIGHT SIDE WITH ROUTINE HEALING, SUBSEQUENT ENCOUNTER: Primary | ICD-10-CM

## 2020-07-30 PROCEDURE — 99213 OFFICE O/P EST LOW 20 MIN: CPT | Performed by: SURGERY

## 2020-07-30 NOTE — ASSESSMENT & PLAN NOTE
- patient is doing well at today's visit  - saturation is 95% on room air  - patient reports that she does get winded sometimes with ambulation however this is much better since she left the hospital  - she offers no new shortness of breath or chest pain at baseline  - she reports she is currently weaning off the oxycodone with Orthopedics  - informed her that no further prescription should be prescribed and that she should continue this self wean so that she is no longer on a narcotic  - she was agreeable to this plan and I echoed that Orthopedics with likely sure the same opinion with her; again she was agreeable with this  - no further workup from a trauma perspective  - no repeat imaging  - told to call back with questions or concerns

## 2020-08-08 ENCOUNTER — APPOINTMENT (EMERGENCY)
Dept: CT IMAGING | Facility: HOSPITAL | Age: 60
End: 2020-08-08
Payer: COMMERCIAL

## 2020-08-08 ENCOUNTER — HOSPITAL ENCOUNTER (EMERGENCY)
Facility: HOSPITAL | Age: 60
Discharge: HOME | End: 2020-08-08
Attending: EMERGENCY MEDICINE | Admitting: EMERGENCY MEDICINE
Payer: COMMERCIAL

## 2020-08-08 VITALS
RESPIRATION RATE: 18 BRPM | TEMPERATURE: 98 F | OXYGEN SATURATION: 96 % | HEART RATE: 86 BPM | DIASTOLIC BLOOD PRESSURE: 78 MMHG | SYSTOLIC BLOOD PRESSURE: 163 MMHG

## 2020-08-08 DIAGNOSIS — S00.03XA SCALP HEMATOMA, INITIAL ENCOUNTER: ICD-10-CM

## 2020-08-08 DIAGNOSIS — S09.90XA CLOSED HEAD INJURY, INITIAL ENCOUNTER: ICD-10-CM
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
## 2020-08-08 PROCEDURE — 70450 CT HEAD/BRAIN W/O DYE: CPT

## 2020-08-08 PROCEDURE — 93005 ELECTROCARDIOGRAM TRACING: CPT

## 2020-08-08 PROCEDURE — 70486 CT MAXILLOFACIAL W/O DYE: CPT

## 2020-08-08 PROCEDURE — G1004 CDSM NDSC: HCPCS

## 2020-08-08 PROCEDURE — 99284 EMERGENCY DEPT VISIT MOD MDM: CPT

## 2020-08-08 PROCEDURE — 99285 EMERGENCY DEPT VISIT HI MDM: CPT | Performed by: EMERGENCY MEDICINE

## 2020-08-08 RX ORDER — ONDANSETRON 4 MG/1
4 TABLET, ORALLY DISINTEGRATING ORAL ONCE
Status: COMPLETED | OUTPATIENT
Start: 2020-08-08 | End: 2020-08-08

## 2020-08-08 RX ORDER — ACETAMINOPHEN 325 MG/1
975 TABLET ORAL ONCE
Status: COMPLETED | OUTPATIENT
Start: 2020-08-08 | End: 2020-08-08

## 2020-08-08 RX ADMIN — ONDANSETRON 4 MG: 4 TABLET, ORALLY DISINTEGRATING ORAL at 13:42

## 2020-08-08 RX ADMIN — ACETAMINOPHEN 975 MG: 325 TABLET ORAL at 13:42

## 2020-08-08 NOTE — DISCHARGE INSTRUCTIONS
Your CT head showed a scalp hematoma  There were no fractures or acute intracranial findings  Please apply ice to the area  Take Tylenol and Ibuprofen as needed for pain  Encourage fall precautions as discussed  Return to the ED if you experience new or worsening symptoms

## 2020-08-08 NOTE — ED ATTENDING ATTESTATION
8/8/2020  I, Andie Crain MD, saw and evaluated the patient  I have discussed the patient with the resident/non-physician practitioner and agree with the resident's/non-physician practitioner's findings, Plan of Care, and MDM as documented in the resident's/non-physician practitioner's note, except where noted  All available labs and Radiology studies were reviewed  I was present for key portions of any procedure(s) performed by the resident/non-physician practitioner and I was immediately available to provide assistance  At this point I agree with the current assessment done in the Emergency Department  I have conducted an independent evaluation of this patient a history and physical is as follows:    63 YO female presents after a mechanical fall  Tripped and struck the Right side of her head on the wall  She denies LOC, has some dizziness, states blurred vision  States she does have pain at the sight  Gen: Pt is in NAD  HEENT: Hematoma to the Right eyebrow, EOM's intact, neck has FROM, no midline neck tenderness  Chest: CTAB, non-tender  Heart: RRR  Abdomen: Soft, NT/ND  Musculoskeletal: FROM in all extremities  Skin: No rash, no ecchymosis  Neuro: Awake, alert, oriented x4; Cranial nerves II-XII intact  Psych: Normal affect    MDM - Pt with mechanical fall, takes anticoagulation  Will CT head and face as she does have a hematoma        ED Course         Critical Care Time  Procedures

## 2020-08-08 NOTE — ED PROVIDER NOTES
History  Chief Complaint   Patient presents with    Fall     slipped on a shirt on the ground and fell into the wall, abrasion and hematoma to right side of forehead, abrasion on right knee  Taking Eliquis  No LOC     Chrissie Acosta is a 62year-old female with PMHx PE on Eliquis, orthostatic hypotension, migraine headaches, hypokalemia, bipolar disorder, and fibromyalgia presenting for evaluation after a fall at home  Patient states that she had tripped on a t-shirt at home and had fallen forward, striking the front of her head off the wall  She presents with a hematoma to the right frontal scalp, and admits to pain in this area  Patient denies any dizziness, near-syncope or syncope prior to the fall  Denies any seizure-like activity  Denies LOC from the fall  Patient reports that she was able to bring herself to standing and was ambulatory after the fall  She denies any lightheadedness or weakness at this time  She additionally denies any vision disturbance, neck pain, back pain, extremity numbness or weakness, bladder/bowel change, saddle anesthesia, chest pain or shortness of breath  She does admit to having some nausea, but denies abdominal pain or vomiting  Tetanus UTD  Per review of Epic, patient was recently admitted on 7/3 for syncope with fall down steps and was found to have multiple rib fractures and left distal radius/ulnar fractures, now s/p left ORIF of distal radius and ulnar fractures without evidence of acute complication         History provided by:  Patient  Fall   Mechanism of injury: fall    Injury location:  Head/neck  Head/neck injury location:  Scalp  Arrived directly from scene: yes    Fall:     Fall occurred:  Standing    Impact surface:  Wall    Point of impact:  Head  Suspicion of alcohol use: no    Suspicion of drug use: no    Tetanus status:  Up to date  Prior to arrival data:     Patient ambulatory at scene: yes      Blood loss:  None    Responsiveness at scene:  Alert    Loss of consciousness: no      Amnesic to event: no    Associated symptoms: headaches and nausea    Associated symptoms: no abdominal pain, no back pain, no chest pain, no loss of consciousness, no neck pain, no seizures and no vomiting        Prior to Admission Medications   Prescriptions Last Dose Informant Patient Reported? Taking? ARIPiprazole (ABILIFY) 15 mg tablet   No No   Sig: Take 0 5 tablets (7 5 mg total) by mouth daily   Patient taking differently: Take 15 mg by mouth daily    Erenumab-aooe (AIMOVIG) 140 MG/ML SOAJ   Yes No   Sig: Inject 140 mg under the skin every 30 (thirty) days    LORazepam (ATIVAN) 1 mg tablet   No No   Sig: Take 2 tablets (2 mg total) by mouth 2 (two) times a day for 10 days   acetaminophen (TYLENOL) 500 mg tablet   No No   Sig: Take 1 tablet (500 mg total) by mouth every 6 (six) hours as needed (pain)   apixaban (ELIQUIS) 5 mg   Yes No   Sig: Take 5 mg by mouth 2 (two) times a day   cholecalciferol (VITAMIN D3) 1,000 units tablet   Yes No   Sig: Take 5,000 Units by mouth daily   ferrous sulfate 325 (65 Fe) mg tablet   Yes No   Sig: Take 325 mg by mouth daily with breakfast   fluvoxaMINE (LUVOX) 50 mg tablet   No No   Sig: Take 3 tablets (150 mg total) by mouth daily at bedtime   Patient taking differently: Take 300 mg by mouth daily at bedtime    gabapentin (NEURONTIN) 300 mg capsule   No No   Sig: Take 1 capsule (300 mg total) by mouth 3 (three) times a day   lamoTRIgine (LaMICtal) 200 MG tablet   Yes No   Sig: Take 200 mg by mouth daily     meclizine (ANTIVERT) 12 5 MG tablet   No No   Sig: Take 1 tablet (12 5 mg total) by mouth every 8 (eight) hours as needed for dizziness for up to 7 days   mexiletine (MEXITIL) 150 mg capsule   Yes No   Sig: Take 150 mg by mouth 3 (three) times a day   ondansetron (ZOFRAN) 4 mg tablet   No No   Sig: Take 1 tablet (4 mg total) by mouth every 8 (eight) hours as needed for nausea or vomiting   oxyCODONE (ROXICODONE) 5 mg immediate release tablet   No No Sig: Take 0 5-1 tab every 8 hours as needed for pain   zonisamide (ZONEGRAN) 100 mg capsule   Yes No   Sig: Take 200 mg by mouth daily      Facility-Administered Medications: None       Past Medical History:   Diagnosis Date    [facility]al insufficiency [facility]Doyle's disease) ([facility] [address] [facility]     Bipolar disorder     C  difficile diarrhea     Cervical radiculopathy     Chronic back pain     [facility], lower extremity ([facility] )     Fibromyalgia     History of TIAs     cannot remember details    Hypertension     Hypokalemia     Migraine     MRSA (methicillin resistant Staphylococcus aureus) 2012    nasal swab negative 19    Psychiatric disorder     Anxiety, major depression, bipolar    Spinal stenosis     Syncope     orthostatic hypotension       Past Surgical History:   Procedure Laterality Date    APPENDECTOMY      CAST APPLICATION Left 1122    Procedure: Application short-arm splint;  Surgeon: Erich Lopez MD;  [facility];  Service: Orthopedics    GASTRIC RESTRICTION SURGERY      Gastric Sleeve 2015    HYSTERECTOMY      JOINT REPLACEMENT      Left Knee  and Right Hip     ORIF WRIST FRACTURE Left 2020    Procedure: Open reduction and internal fixation left radius and ulnar shaft fracture;  Surgeon: Erich Lopez MD;  [facility];  Service: Orthopedics       Family History   Problem Relation Age of Onset    Breast cancer Mother     Migraines Mother     Arthritis Mother     Kidney disease Father     Heart disease Father     Diabetes Father     Arthritis Father     Brain cancer Brother     Seizures Brother      I have reviewed and agree with the history as documented      E-Cigarette/Vaping    E-Cigarette Use Never User      E-Cigarette/Vaping Substances     Social History     Tobacco Use    Smoking status: Former Smoker     Packs/day: 0 20     Types: Cigarettes     Last attempt to quit:      Years since quittin 6    Smokeless tobacco: Never Used   Substance Use Topics    Alcohol use: Yes     Frequency: Monthly or less     Binge frequency: Never     Comment: occasionally    Drug use: Never       Review of Systems   Constitutional: Negative for activity change, appetite change, chills and fever  Eyes: Negative for photophobia, pain and visual disturbance  Respiratory: Negative for shortness of breath  Cardiovascular: Negative for chest pain  Gastrointestinal: Positive for nausea  Negative for abdominal pain and vomiting  Genitourinary: Negative for difficulty urinating  Musculoskeletal: Negative for back pain, neck pain and neck stiffness  Skin: Positive for wound (right supraorbital hematoma and abrasion)  Neurological: Positive for headaches  Negative for dizziness, seizures, loss of consciousness, weakness, light-headedness and numbness  All other systems reviewed and are negative  Physical Exam  Physical Exam  Vitals signs and nursing note reviewed  Constitutional:       General: She is not in acute distress  Appearance: Normal appearance  She is normal weight  She is not ill-appearing or toxic-appearing  Comments: Well-appearing 62year-old female, resting comfortably on exam bed in no significant distress   HENT:      Head: Normocephalic  Jaw: No trismus, tenderness, swelling or malocclusion  Comments: Tenderness to palpation in the right supraorbital area, right zygomatic bone, and nasal bone  No obvious nasal bone deformity or instability       Right Ear: Tympanic membrane, ear canal and external ear normal       Left Ear: Tympanic membrane, ear canal and external ear normal       Mouth/Throat:      Mouth: Mucous membranes are moist    Eyes:      Extraocular Movements: Extraocular movements intact  Right eye: Normal extraocular motion and no nystagmus  Left eye: Normal extraocular motion and no nystagmus        Conjunctiva/sclera: Conjunctivae normal       Right eye: Right conjunctiva is not injected  Left eye: Left conjunctiva is not injected  Pupils: Pupils are equal, round, and reactive to light  Comments: EOMI bilaterally  No proptosis or ptosis   Neck:      Musculoskeletal: Normal range of motion and neck supple  No pain with movement, spinous process tenderness or muscular tenderness  Comments: Full painless active ROM in the neck  No midline or paraspinal cervical tenderness, bony deformity or stepoffs  Cardiovascular:      Rate and Rhythm: Normal rate and regular rhythm  Pulses: Normal pulses  Heart sounds: Normal heart sounds  No murmur  Pulmonary:      Effort: Pulmonary effort is normal  No respiratory distress  Breath sounds: Normal breath sounds  No wheezing, rhonchi or rales  Chest:      Chest wall: No tenderness  Abdominal:      General: Bowel sounds are normal  There is no distension  Palpations: Abdomen is soft  Tenderness: There is no abdominal tenderness  There is no guarding  Musculoskeletal: Normal range of motion  General: No swelling or deformity  Right lower leg: No edema  Left lower leg: No edema  Comments: No midline or paraspinal thoracolumbar tenderness to palpation, bony deformity or stepoffs  Moving all extremities spontaneously with 5/5 strength throughout  Pelvis stable  No evidence of trauma or bony deformity in the extremities  Skin:     General: Skin is warm and dry  Capillary Refill: Capillary refill takes less than 2 seconds  Comments: Well-healing surgical scar left ventral forearm, s/p ORIF   Neurological:      General: No focal deficit present  Mental Status: She is alert and oriented to person, place, and time  Mental status is at baseline  GCS: GCS eye subscore is 4  GCS verbal subscore is 5  GCS motor subscore is 6  Sensory: Sensation is intact  No sensory deficit  Motor: Motor function is intact   No tremor, atrophy or abnormal muscle tone       Coordination: Coordination is intact  Gait: Gait is intact  Comments: Neuro exam grossly non-focal  Patient awake and alert, speech clear, good strength in all extremities, no sensory deficits or abnormal tone  Patient ambulatory in the ED independently with steady gait  Psychiatric:         Mood and Affect: Mood normal          Behavior: Behavior normal          Vital Signs  ED Triage Vitals [08/08/20 1237]   Temperature Pulse Respirations Blood Pressure SpO2   98 °F (36 7 °C) 86 18 163/78 96 %      Temp Source Heart Rate Source Patient Position - Orthostatic VS BP Location FiO2 (%)   Temporal Monitor Sitting Left arm --      Pain Score       9           Vitals:    08/08/20 1237   BP: 163/78   Pulse: 86   Patient Position - Orthostatic VS: Sitting         Visual Acuity  Visual Acuity      Most Recent Value   L Pupil Size (mm)  4   R Pupil Size (mm)  4          ED Medications  Medications   ondansetron (ZOFRAN-ODT) dispersible tablet 4 mg (4 mg Oral Given 8/8/20 1342)   acetaminophen (TYLENOL) tablet 975 mg (975 mg Oral Given 8/8/20 1342)       Diagnostic Studies  Results Reviewed     None                 CT head without contrast   Final Result by Marisol Tate MD (08/08 1404)      Right supraorbital and vertex scalp hematomata without subjacent fracture, intracranial hemorrhage, or other acute intracranial findings  Workstation performed: [id number]         CT facial bones without contrast   Final Result by Marisol Tate MD (08/08 1409)      Right supraorbital hematoma without facial fractures or soft tissue injury to the right orbit                 Workstation performed: [id number]                    Procedures  ECG 12 Lead Documentation Only    Date/Time: 8/8/2020 1:24 PM  Performed by: Audrey Romero PA-C  Authorized by: Audrey Romero PA-C     Indications / Diagnosis:  Evaluate for electrolyte disturbance  ECG reviewed by me, the ED Provider: yes    Patient location:  ED  Previous ECG:     Previous ECG:  Compared to current    Comparison ECG info:  7/3/2020    Similarity:  No change    Comparison to cardiac monitor: Yes    Interpretation:     Interpretation: abnormal    Rate:     ECG rate:  78    ECG rate assessment: normal    Rhythm:     Rhythm: sinus rhythm    Ectopy:     Ectopy: none    QRS:     QRS axis:  Left  Conduction:     Conduction: normal    ST segments:     ST segments:  Non-specific  T waves:     T waves: non-specific    Comments:      ECG reviewed by myself and attending physician, appears unchanged from prior  ED Course     ED Course as of Aug 08 1648   Sat Aug 08, 2020   1315 Patient evaluated, care plan discussed with attending physician  Will order imaging    1430 ECG ordered and unchanged when compared to previous  CT head reports no acute intracranial abnormality, CT facial bone reports no acute fracture/dislocation  Patient advised of imaging findings and plan for discharge home with return precautions  She is comfortable and agreeable with this  Stable for discharge home         US AUDIT      Most Recent Value   Initial Alcohol Screen: US AUDIT-C    1  How often do you have a drink containing alcohol?  0 Filed at: 08/08/2020 1238   2  How many drinks containing alcohol do you have on a typical day you are drinking? 0 Filed at: 08/08/2020 1238   3b  FEMALE Any Age, or MALE 65+: How often do you have 4 or more drinks on one occassion? 0 Filed at: 08/08/2020 1238   Audit-C Score  0 Filed at: 08/08/2020 1238                  CHANDRAKANT/DAST-10      Most Recent Value   How many times in the past year have you    Used an illegal drug or used a prescription medication for non-medical reasons?   Never Filed at: 08/08/2020 1238              MDM  Number of Diagnoses or Management Options  Closed head injury, initial encounter: new and requires workup  Fall, initial encounter: new and requires workup  Scalp hematoma, initial encounter: new and requires workup  Diagnosis management comments: This is a 62year-old female on Eliquis for PE arriving to the ED for evaluation after a mechanical fall at home  Patient reports positive head strike, suffering a right frontal scalp hematoma, but denies any LOC  Patient relates she woke in her usual state of health this morning but reports that she unfortunately tripped over a t-shirt causing the fall  Admits to headache at site of hematoma and nausea, but offers no other significant complaints  Differential diagnosis includes but not limited to: hematoma, contusion, CHI, mechanical fall, rule out facial bone fracture, rule out acute intracranial abnormality    Initial ED plan: pain control and imaging    Final ED Assessment: CT head and CT facial bones reported no acute intracranial abnormality or facial bone fracture  ECG unchanged from prior performed on 7/3/2020  Patient was given Zofran and Tylenol with symptomatic improvement  She was advised of all imaging findings and plan for discharge home  Return parameters discussed at length  Fall precautions emphasized  She is understanding and agreeable with plan of care  Stable for discharge home in good condition  Patient witnessed ambulating in the ED with steady gait         Amount and/or Complexity of Data Reviewed  Tests in the radiology section of CPT®: ordered and reviewed  Review and summarize past medical records: yes  Discuss the patient with other providers: yes  Independent visualization of images, tracings, or specimens: yes    Risk of Complications, Morbidity, and/or Mortality  Presenting problems: low  Diagnostic procedures: low  Management options: low    Patient Progress  Patient progress: stable        Disposition  Final diagnoses:   Fall, initial encounter   Closed head injury, initial encounter   Scalp hematoma, initial encounter - Right supraorbital and vertex scalp hematoma     Time reflects when diagnosis was documented in both MDM as applicable and the Disposition within this note     Time User Action Codes Description Comment    8/8/2020  2:15 PM Harle Masker Add [Q74  AVXH] Fall, initial encounter     8/8/2020  2:15 PM Harle Masker Add [S09 90XA] Closed head injury, initial encounter     8/8/2020  2:16 PM Harle Masker Add [S00 03XA] Scalp hematoma, initial encounter     8/8/2020  2:17 PM Harle Masker Modify [S00 03XA] Scalp hematoma, initial encounter Right supraorbital and vertex scalp hematoma      ED Disposition     ED Disposition Condition Date/Time Comment    Discharge Stable Sat Aug 8, 2020  2:15 PM John Duke discharge to home/self care              Follow-up Information     Follow up With Specialties Details Why Contact Info Additional [id number] Shanique Joe, DO   As needed Ryley Gaoma [address] [facility] Emergency Department Emergency Medicine  If symptoms worsen [facility] [id number]  [phone] [place] ED, [address] [facility] [place], [place], [address]          Discharge Medication List as of 8/8/2020  2:19 PM      CONTINUE these medications which have NOT CHANGED    Details   acetaminophen (TYLENOL) 500 mg tablet Take 1 tablet (500 mg total) by mouth every 6 (six) hours as needed (pain), Starting Tue 7/30/2019, Print      apixaban (ELIQUIS) 5 mg Take 5 mg by mouth 2 (two) times a day, Historical Med      ARIPiprazole (ABILIFY) 15 mg tablet Take 0 5 tablets (7 5 mg total) by mouth daily, Starting Mon 10/21/2019, Print      cholecalciferol (VITAMIN D3) 1,000 units tablet Take 5,000 Units by mouth daily, Historical Med      Erenumab-aooe (AIMOVIG) 140 MG/ML SOAJ Inject 140 mg under the skin every 30 (thirty) days , Starting Fri 3/22/2019, Historical Med      ferrous sulfate 325 (65 Fe) mg tablet Take 325 mg by mouth daily with breakfast, Historical Med      fluvoxaMINE (LUVOX) 50 mg tablet Take 3 tablets (150 mg total) by mouth daily at bedtime, Starting Sun 10/20/2019, Print      gabapentin (NEURONTIN) 300 mg capsule Take 1 capsule (300 mg total) by mouth 3 (three) times a day, Starting Thu 7/9/2020, Normal      lamoTRIgine (LaMICtal) 200 MG tablet Take 200 mg by mouth daily  , Until Discontinued, Historical Med      LORazepam (ATIVAN) 1 mg tablet Take 2 tablets (2 mg total) by mouth 2 (two) times a day for 10 days, Starting Thu 7/9/2020, Print      meclizine (ANTIVERT) 12 5 MG tablet Take 1 tablet (12 5 mg total) by mouth every 8 (eight) hours as needed for dizziness for up to 7 days, Starting Wed 7/1/2020, Normal      mexiletine (MEXITIL) 150 mg capsule Take 150 mg by mouth 3 (three) times a day, Historical Med      ondansetron (ZOFRAN) 4 mg tablet Take 1 tablet (4 mg total) by mouth every 8 (eight) hours as needed for nausea or vomiting, Starting Mon 10/7/2019, Normal      oxyCODONE (ROXICODONE) 5 mg immediate release tablet Take 0 5-1 tab every 8 hours as needed for pain, Normal      zonisamide (ZONEGRAN) 100 mg capsule Take 200 mg by mouth daily, Historical Med           No discharge procedures on file      PDMP Review       Value Time User    PDMP Reviewed  Yes 7/27/2020  5:13 PM Aminah Blue PA-C          ED Provider  Electronically Signed by           Carson Stephenson PA-C  08/08/20 0540

## 2020-08-09 LAB
ATRIAL RATE: 78 BPM
P AXIS: 26 DEGREES
PR INTERVAL: 162 MS
QRS AXIS: -13 DEGREES
QRSD INTERVAL: 86 MS
QT INTERVAL: 378 MS
QTC INTERVAL: 430 MS
T WAVE AXIS: -19 DEGREES
VENTRICULAR RATE: 78 BPM

## 2020-08-09 PROCEDURE — 93010 ELECTROCARDIOGRAM REPORT: CPT | Performed by: INTERNAL MEDICINE

## 2020-08-21 ENCOUNTER — DOCUMENTATION (OUTPATIENT)
Dept: CASE MANAGEMENT | Facility: OTHER | Age: 60
End: 2020-08-21

## 2020-08-21 NOTE — MEDICAL HIGH UTILIZER
Patient Name: Justin Sales [facility] BROWN DEER KUS: [id number]       : 1960     Age: 59       Sex:  F   Utilization Background: She is a female with a history of migraine, Andérss disease, Bipolar, Depression, Fibromyalgia, and HTN who presents to [facility] and [facility] with migraine  She has 14 ER visits, 9 admission, and 2 transfers to [facility] for Ketamine Infusions in 2019  Discussed with Neurology reveals that patient does not feel much better even after prolonged hospitalization         Treatment Recommendations:  ED Recommendations: Recommendations as per neurology: Give migraine protocol: using steroid protocol d/t toradol allergy  Recommend calling her [facility] Neurologist to schedule close outpatient follow up  It is noted that the patient will provide other complaints to the ER providers to get admitted and then will complain of migraine in the hospital    Would not offer transfer for Ketamine Infusion to this patient as it has not worked in the past  This should be left up to Neurology to determine if this is appropriate         Inpatient Recommendations: Early consultation with neurology  Avoid narcotics/sedating agents due to over sedation in the past         Outpatient recommendations: Needs close follow up with [facility] Neurology  Needs CM to make sure that patient does not run out of her meds as she has in the past     Situation: Patient is a patient who presents frequently for migraines  It seems that she has started using other chief complaints to get admitted to the hospital for migraine treatment   As per neurology colleague her headache symptomatology does not usually change with treatment        PMH/PSH:  Migraine, Depression, Bipolar, Fibromyalgia, HTN, Hx of DVT      Assessment                              Drivers of repeated utilization:                 Symptomatology      Community Resources in place:    None - she has mentioned that she does not have a  for infusions May need assistance with transportation and with medications   Patient Care Team:   Nimesh Watts DO - PCP [facility])  La Thomas MD - Neurology [facility])  OP Care Manager: Emeli Nolan RN  [facility] has been trying to outreach as well                    Care plan date and owner: Donna Joy PA-C 10/31/2019         Reviewed with patient before discharge?

## 2020-09-08 NOTE — PROGRESS NOTES
Assessment:   S/P Open reduction and internal fixation left radius and ulnar shaft fracture - Left and Application short-arm splint - Left on 7/4/2020  S/P casting of R distal ulnar shaft fx on 7/72020    Plan:   Cast was removed today and pt still had pain in area of fracture on exam  B/C of this, want to immobilize the fx longer  Casting vs splinting at all times other than hygiene discussed  Explained it is IMPERATIVE that pt keep the splint on to get this to heal    Explained that pt can have occasional discomfort in the forearm/wrist for up to 9 months, however, if pt develops constant pain that worsens with time, she should call us  Activities as tolerated     Follow Up:  4  week(s)    To Do Next Visit:  X-rays of the  right  forearm      CHIEF COMPLAINT:  No chief complaint on file  SUBJECTIVE:  Parish Monteiro is a 62 y o  female who presents for follow up after Open reduction and internal fixation left radius and ulnar shaft fracture - Left and Application short-arm splint - Left on 7/4/2020  Pt also has casting of R ulnar shaft fx  Today patient states she has occasional discomfort in the R arm, but it is random in nature  States the cast has broken and sometimes "slides off"  As for the left side, pt states she was told by the therapist that she didn't need therapy for this  She states the forearm feels fine, but she does have wrist pain  PHYSICAL EXAMINATION:  Vital signs: There were no vitals taken for this visit  General: well developed and well nourished, alert, oriented times 3 and appears comfortable  Psychiatric: Normal    MUSCULOSKELETAL EXAMINATION:  Incision: Clean, dry, intact   Cast: Before removal, cast is loose and the area between the thumb and hand is broken  Range of Motion: As expected, pt surprisingly has well maintained wrist flexion/extension b/l  Supination just slightly limited     Tenderness: Pt has some ttp along the mid and ulnar sided wrist  She denies ttp along thumb CMC, scaphoid  No obvious instability  Neurovascular status: Neuro intact and radial pulse 2+  Done today: Cast removed      STUDIES REVIEWED:  Images were reviewed in PACS by myself and Dr Jamal Burton and demonstrate: L forearm xrays show healing fractures s/p ORIF  No evidence of hardware malfunction  Right side shows evidence of bony healing, but the bone is not completely healed         PROCEDURES PERFORMED:  Procedures  No Procedures performed today

## 2020-09-09 ENCOUNTER — OFFICE VISIT (OUTPATIENT)
Dept: OBGYN CLINIC | Facility: HOSPITAL | Age: 60
End: 2020-09-09

## 2020-09-09 ENCOUNTER — HOSPITAL ENCOUNTER (OUTPATIENT)
Dept: RADIOLOGY | Facility: HOSPITAL | Age: 60
Discharge: HOME | End: 2020-09-09
Payer: COMMERCIAL

## 2020-09-09 VITALS
BODY MASS INDEX: 34.25 KG/M2 | HEART RATE: 102 BPM | SYSTOLIC BLOOD PRESSURE: 136 MMHG | DIASTOLIC BLOOD PRESSURE: 80 MMHG | HEIGHT: 68 IN | WEIGHT: 226 LBS

## 2020-09-09 DIAGNOSIS — Z87.81 S/P ORIF (OPEN REDUCTION INTERNAL FIXATION) FRACTURE: ICD-10-CM

## 2020-09-09 DIAGNOSIS — S52.231A CLOSED DISPLACED OBLIQUE FRACTURE OF SHAFT OF RIGHT ULNA, INITIAL ENCOUNTER: ICD-10-CM

## 2020-09-09 DIAGNOSIS — Z98.890 S/P ORIF (OPEN REDUCTION INTERNAL FIXATION) FRACTURE: ICD-10-CM

## 2020-09-09 DIAGNOSIS — Z98.890 S/P ORIF (OPEN REDUCTION INTERNAL FIXATION) FRACTURE: Primary | ICD-10-CM

## 2020-09-09 DIAGNOSIS — Z87.81 S/P ORIF (OPEN REDUCTION INTERNAL FIXATION) FRACTURE: Primary | ICD-10-CM

## 2020-09-09 PROCEDURE — 99024 POSTOP FOLLOW-UP VISIT: CPT | Performed by: PHYSICIAN ASSISTANT

## 2020-09-09 PROCEDURE — 73090 X-RAY EXAM OF FOREARM: CPT

## 2020-10-21 ENCOUNTER — HOSPITAL ENCOUNTER (OUTPATIENT)
Dept: RADIOLOGY | Facility: HOSPITAL | Age: 60
Discharge: HOME | End: 2020-10-21
Attending: ORTHOPAEDIC SURGERY
Payer: COMMERCIAL

## 2020-10-21 ENCOUNTER — OFFICE VISIT (OUTPATIENT)
Dept: OBGYN CLINIC | Facility: HOSPITAL | Age: 60
End: 2020-10-21
Payer: COMMERCIAL

## 2020-10-21 DIAGNOSIS — Z98.890 S/P ORIF (OPEN REDUCTION INTERNAL FIXATION) FRACTURE: Primary | ICD-10-CM

## 2020-10-21 DIAGNOSIS — Z87.81 S/P ORIF (OPEN REDUCTION INTERNAL FIXATION) FRACTURE: ICD-10-CM

## 2020-10-21 DIAGNOSIS — Z87.81 S/P ORIF (OPEN REDUCTION INTERNAL FIXATION) FRACTURE: Primary | ICD-10-CM

## 2020-10-21 DIAGNOSIS — Z98.890 S/P ORIF (OPEN REDUCTION INTERNAL FIXATION) FRACTURE: ICD-10-CM

## 2020-10-21 DIAGNOSIS — M77.12 LATERAL EPICONDYLITIS OF LEFT ELBOW: ICD-10-CM

## 2020-10-21 DIAGNOSIS — M75.42 IMPINGEMENT SYNDROME OF LEFT SHOULDER: ICD-10-CM

## 2020-10-21 PROCEDURE — 73090 X-RAY EXAM OF FOREARM: CPT

## 2020-10-21 PROCEDURE — 99213 OFFICE O/P EST LOW 20 MIN: CPT | Performed by: ORTHOPAEDIC SURGERY

## 2020-10-23 ENCOUNTER — TELEPHONE (OUTPATIENT)
Dept: OBGYN CLINIC | Facility: HOSPITAL | Age: 60
End: 2020-10-23

## 2020-10-26 ENCOUNTER — TELEPHONE (OUTPATIENT)
Dept: OBGYN CLINIC | Facility: HOSPITAL | Age: 60
End: 2020-10-26

## 2020-11-19 ENCOUNTER — DOCUMENTATION (OUTPATIENT)
Dept: CASE MANAGEMENT | Facility: OTHER | Age: 60
End: 2020-11-19

## 2021-01-18 ENCOUNTER — TELEPHONE (OUTPATIENT)
Dept: OBGYN CLINIC | Facility: HOSPITAL | Age: 61
End: 2021-01-18

## 2021-01-18 NOTE — TELEPHONE ENCOUNTER
Please be advise I spoke to pt  Who stated 3 days ago she started with a sharp  pain that is radiating from left lower breast area to left elbow   Pt  Stated pain is currently a 9 out of 10 and is not getting better  Pt  Has been taking Tylenol extra strength 2 tablets Q8h for the last 3 days with no relief  I advised pt  To go to the ED to be checked asap to rule out any other issue, as women sometimes present with pain down left arm with MI   Pt  Verbalized understanding and will go to ED

## 2021-01-18 NOTE — TELEPHONE ENCOUNTER
Patient sees Dr Dilip Javed  Patient called asking to speak to the nurse  She is having severe pain in her left elbow that is irradiating through her shoulder  She had surgery back in July and she has a follow up appointment this upcoming Friday     # [phone]

## 2021-01-22 ENCOUNTER — OFFICE VISIT (OUTPATIENT)
Dept: OBGYN CLINIC | Facility: HOSPITAL | Age: 61
End: 2021-01-22
Payer: COMMERCIAL

## 2021-01-22 ENCOUNTER — HOSPITAL ENCOUNTER (OUTPATIENT)
Dept: RADIOLOGY | Facility: HOSPITAL | Age: 61
Discharge: HOME | End: 2021-01-22
Attending: ORTHOPAEDIC SURGERY
Payer: COMMERCIAL

## 2021-01-22 VITALS
SYSTOLIC BLOOD PRESSURE: 130 MMHG | DIASTOLIC BLOOD PRESSURE: 87 MMHG | HEART RATE: 89 BPM | HEIGHT: 68 IN | BODY MASS INDEX: 33.95 KG/M2 | WEIGHT: 224 LBS

## 2021-01-22 DIAGNOSIS — Z87.81 S/P ORIF (OPEN REDUCTION INTERNAL FIXATION) FRACTURE: ICD-10-CM

## 2021-01-22 DIAGNOSIS — S52.231A CLOSED DISPLACED OBLIQUE FRACTURE OF SHAFT OF RIGHT ULNA, INITIAL ENCOUNTER: Primary | ICD-10-CM

## 2021-01-22 DIAGNOSIS — Z98.890 S/P ORIF (OPEN REDUCTION INTERNAL FIXATION) FRACTURE: ICD-10-CM

## 2021-01-22 DIAGNOSIS — S52.231A CLOSED DISPLACED OBLIQUE FRACTURE OF SHAFT OF RIGHT ULNA, INITIAL ENCOUNTER: ICD-10-CM

## 2021-01-22 DIAGNOSIS — M75.42 IMPINGEMENT SYNDROME OF LEFT SHOULDER: ICD-10-CM

## 2021-01-22 PROCEDURE — 73090 X-RAY EXAM OF FOREARM: CPT

## 2021-01-22 PROCEDURE — 99214 OFFICE O/P EST MOD 30 MIN: CPT | Performed by: ORTHOPAEDIC SURGERY

## 2021-01-22 PROCEDURE — 20610 DRAIN/INJ JOINT/BURSA W/O US: CPT | Performed by: ORTHOPAEDIC SURGERY

## 2021-01-22 RX ORDER — BETAMETHASONE SODIUM PHOSPHATE AND BETAMETHASONE ACETATE 3; 3 MG/ML; MG/ML
12 INJECTION, SUSPENSION INTRA-ARTICULAR; INTRALESIONAL; INTRAMUSCULAR; SOFT TISSUE
Status: COMPLETED | OUTPATIENT
Start: 2021-01-22 | End: 2021-01-22

## 2021-01-22 RX ORDER — LIDOCAINE HYDROCHLORIDE 10 MG/ML
2 INJECTION, SOLUTION INFILTRATION; PERINEURAL
Status: COMPLETED | OUTPATIENT
Start: 2021-01-22 | End: 2021-01-22

## 2021-01-22 RX ADMIN — LIDOCAINE HYDROCHLORIDE 2 ML: 10 INJECTION, SOLUTION INFILTRATION; PERINEURAL at 12:12

## 2021-01-22 RX ADMIN — BETAMETHASONE SODIUM PHOSPHATE AND BETAMETHASONE ACETATE 12 MG: 3; 3 INJECTION, SUSPENSION INTRA-ARTICULAR; INTRALESIONAL; INTRAMUSCULAR; SOFT TISSUE at 12:12

## 2021-01-22 NOTE — PROGRESS NOTES
ASSESSMENT/PLAN:    Assessment:   S/P Open reduction and internal fixation left radius and ulnar shaft fracture 7/4/2020   Right distal ulna fracture (treated conservatively)   Left lateral epicondylitis (new issue)  Left shoulder impingement (new issue)    Plan:    patient was given a steroid injection to her left subacromial bursa today  A referral was placed in the patient's chart for follow-up with Dr Torres Orf  She will meet with our occupational therapist in the office today for home exercise program for her left shoulder due to her inability to get to formal therapy  Follow Up:  PRN    To Do Next Visit:       _____________________________________________________  CHIEF COMPLAINT:  Chief Complaint   Patient presents with    Left Forearm - Follow-up    Right Forearm - Follow-up, Fracture    Left Elbow - Pain         SUBJECTIVE:  Rosalva Dubon is a 56 y o  female who presents for follow up regarding S/P Open reduction and internal fixation left radius and ulnar shaft fracture 7/4/2020, Right distal ulna fracture (treated conservatively), Left lateral epicondylitis (new issue) and Left shoulder impingement (new issue)   Since last visit, Rosalva Dubon has tried Resume activities as tolerated with relief to her wrists  Today there is Pain  Severe  Constant  Sharp and Aching to the left shoulder      Radiation: Yes to the  elbow  Associated symptoms: No Complaints    PAST MEDICAL HISTORY:  Past Medical History:   Diagnosis Date    Adrenal insufficiency ([place]ill's disease) ([facility] [address] )     Bipolar disorder     C  difficile diarrhea     Cervical radiculopathy     Chronic back pain     DVT, lower extremity ([facility] [address] ) 1991    Fibromyalgia     History of TIAs     cannot remember details    Hypertension     Hypokalemia     Migraine     MRSA (methicillin resistant Staphylococcus aureus) 07/20/2012    nasal swab negative 4/5/19    Psychiatric disorder     Anxiety, major depression, bipolar    Spinal stenosis  Syncope     orthostatic hypotension       PAST SURGICAL HISTORY:  Past Surgical History:   Procedure Laterality Date    APPENDECTOMY      CAST APPLICATION Left 3022    Procedure: Application short-arm splint;  Surgeon: Waleska Adame MD;  [facility]: [facility];  Service: Orthopedics    GASTRIC RESTRICTION SURGERY      Gastric Sleeve 2015    HYSTERECTOMY      JOINT REPLACEMENT      Left Knee  and Right Hip     ORIF WRIST FRACTURE Left 2020    Procedure: Open reduction and internal fixation left radius and ulnar shaft fracture;  Surgeon: Waleska Adame MD;  [facility];  Service: Orthopedics       FAMILY HISTORY:  Family History   Problem Relation Age of Onset    Breast cancer Mother     Migraines Mother     Arthritis Mother     Kidney disease Father     Heart disease Father     Diabetes Father     Arthritis Father     Brain cancer Brother     Seizures Brother        SOCIAL HISTORY:  Social History     Tobacco Use    Smoking status: Former Smoker     Packs/day: 0 20     Types: Cigarettes     Quit date:      Years since quittin 0    Smokeless tobacco: Never Used   Substance Use Topics    Alcohol use: Yes     Frequency: Monthly or less     Binge frequency: Never     Comment: occasionally    Drug use: Never       MEDICATIONS:    Current Outpatient Medications:     acetaminophen (TYLENOL) 500 mg tablet, Take 1 tablet (500 mg total) by mouth every 6 (six) hours as needed (pain), Disp: 30 tablet, Rfl: 0    apixaban (ELIQUIS) 5 mg, Take 5 mg by mouth 2 (two) times a day, Disp: , Rfl:     ARIPiprazole (ABILIFY) 15 mg tablet, Take 0 5 tablets (7 5 mg total) by mouth daily (Patient taking differently: Take 15 mg by mouth daily ), Disp: 30 tablet, Rfl: 0    cholecalciferol (VITAMIN D3) 1,000 units tablet, Take 5,000 Units by mouth daily, Disp: , Rfl:     Erenumab-aooe (AIMOVIG) 140 MG/ML SOAJ, Inject 140 mg under the skin every 30 (thirty) days , Disp: , Rfl:     ferrous sulfate 325 (65 Fe) mg tablet, Take 325 mg by mouth daily with breakfast, Disp: , Rfl:     fluvoxaMINE (LUVOX) 50 mg tablet, Take 3 tablets (150 mg total) by mouth daily at bedtime (Patient taking differently: Take 300 mg by mouth daily at bedtime ), Disp: 90 tablet, Rfl: 0    gabapentin (NEURONTIN) 300 mg capsule, Take 1 capsule (300 mg total) by mouth 3 (three) times a day, Disp: 30 capsule, Rfl: 0    lamoTRIgine (LaMICtal) 200 MG tablet, Take 200 mg by mouth daily  , Disp: , Rfl:     LORazepam (ATIVAN) 1 mg tablet, Take 2 tablets (2 mg total) by mouth 2 (two) times a day for 10 days, Disp: 10 tablet, Rfl: 0    meclizine (ANTIVERT) 12 5 MG tablet, Take 1 tablet (12 5 mg total) by mouth every 8 (eight) hours as needed for dizziness for up to 7 days, Disp: 21 tablet, Rfl: 0    ondansetron (ZOFRAN) 4 mg tablet, Take 1 tablet (4 mg total) by mouth every 8 (eight) hours as needed for nausea or vomiting, Disp: 60 tablet, Rfl: 2    ALLERGIES:  Allergies   Allergen Reactions    Ketorolac Itching and Other (See Comments)     [toradol] Itching, hives    Mushroom Extract Complex        REVIEW OF SYSTEMS:  Pertinent items are noted in HPI      LABS:  HgA1c:   Lab Results   Component Value Date    HGBA1C 5 8 (H) 03/03/2020     BMP:   Lab Results   Component Value Date    GLUCOSE 86 07/04/2020    CALCIUM 7 9 (L) 07/06/2020     07/07/2015    K 3 7 07/06/2020    CO2 34 (H) 07/06/2020     (H) 07/06/2020    BUN 14 07/06/2020    CREATININE 0 66 07/06/2020           _____________________________________________________  PHYSICAL EXAMINATION:  Vital signs: /87   Pulse 89   Ht 5' 8" (1 727 m)   Wt 102 kg (224 lb)   BMI 34 06 kg/m²   General: well developed and well nourished, alert, oriented times 3 and appears comfortable  Psychiatric: Normal  HEENT: Trachea Midline, No torticollis  Cardiovascular: No discernable arrhythmia  Pulmonary: No wheezing or stridor  Skin: No masses, erythema, lacerations, fluctation, ulcerations  Neurovascular: Sensation Intact to the Median, Ulnar, Radial Nerve, Motor Intact to the Median, Ulnar, Radial Nerve and Pulses Intact    MUSCULOSKELETAL EXAMINATION:  LEFT SIDE:  Wrist:  Full Motion, No tenderness, No instability Shoulder: ROM limited to pain, 4/5 infraspinatus, 5/5 supraspinatus    _____________________________________________________  STUDIES REVIEWED:  No Studies to review      PROCEDURES PERFORMED:  Large joint arthrocentesis: L subacromial bursa  Universal Protocol:  Consent given by: patient  Site marked: the operative site was marked  Supporting Documentation  Indications: pain   Procedure Details  Location: shoulder - L subacromial bursa  Medications administered: 2 mL lidocaine 1 %; 12 mg betamethasone acetate-betamethasone sodium phosphate 6 (3-3) mg/mL    Patient tolerance: patient tolerated the procedure well with no immediate complications  Dressing:  Sterile dressing applied            Scribe Attestation    I,:  Em Jacob am acting as a scribe while in the presence of the attending physician :       I,:  Keily Lyles MD personally performed the services described in this documentation    as scribed in my presence :

## 2021-01-22 NOTE — PATIENT INSTRUCTIONS

## 2021-02-08 ENCOUNTER — APPOINTMENT (OUTPATIENT)
Dept: RADIOLOGY | Facility: OTHER | Age: 61
End: 2021-02-08
Payer: COMMERCIAL

## 2021-02-08 ENCOUNTER — OFFICE VISIT (OUTPATIENT)
Dept: OBGYN CLINIC | Facility: OTHER | Age: 61
End: 2021-02-08
Payer: COMMERCIAL

## 2021-02-08 VITALS
DIASTOLIC BLOOD PRESSURE: 82 MMHG | HEIGHT: 68 IN | HEART RATE: 101 BPM | SYSTOLIC BLOOD PRESSURE: 141 MMHG | WEIGHT: 224 LBS | BODY MASS INDEX: 33.95 KG/M2

## 2021-02-08 DIAGNOSIS — E27.1 ADRENAL INSUFFICIENCY (ADDISON'S DISEASE) (HCC): ICD-10-CM

## 2021-02-08 DIAGNOSIS — M25.512 LEFT SHOULDER PAIN, UNSPECIFIED CHRONICITY: ICD-10-CM

## 2021-02-08 DIAGNOSIS — I26.99 PE (PULMONARY THROMBOEMBOLISM) (HCC): ICD-10-CM

## 2021-02-08 DIAGNOSIS — M19.012 PRIMARY OSTEOARTHRITIS OF LEFT SHOULDER: Primary | ICD-10-CM

## 2021-02-08 PROCEDURE — 99214 OFFICE O/P EST MOD 30 MIN: CPT | Performed by: ORTHOPAEDIC SURGERY

## 2021-02-08 PROCEDURE — 73030 X-RAY EXAM OF SHOULDER: CPT

## 2021-02-08 RX ORDER — SODIUM CHLORIDE, SODIUM LACTATE, POTASSIUM CHLORIDE, CALCIUM CHLORIDE 600; 310; 30; 20 MG/100ML; MG/100ML; MG/100ML; MG/100ML
125 INJECTION, SOLUTION INTRAVENOUS CONTINUOUS
Status: CANCELLED | OUTPATIENT
Start: 2021-02-08

## 2021-02-08 NOTE — PROGRESS NOTES
Assessment  Diagnoses and all orders for this visit:    Primary osteoarthritis of left shoulder    Left shoulder pain, unspecified chronicity        Discussion and Plan:    · Explained to the patient that due to her pain affecting her daily activities and she has tried an failed a CSI and PT/HEP a surgical procedure is warranted  She was in agreement with this treatment plan and wished to proceed forward with an anatomic total shoulder arthroplasty  · A thorough discussion was performed with the patient reviewing all operative and nonoperative options as well as the risks of the procedure  Risks discussed include but not limited to persistent pain, dislocation, loosening of the prosthesis, infection, need for further surgery including revision to a reverse total shoulder, rotator cuff rupture , neurovascular injury, as well as the risk of anesthesia  After this discussion all questions were answered and informed consent was obtained for Total Shoulder Arthroplasty of the left shoulder  ·  patient has a history of pulmonary embolus in the past and we will ask her primary physician for guidance as to whether not anticoagulation postoperatively would be indicated, typically for total shoulder arthroplasty pharmaceutical prophylaxis for the no thrombolic events is not indicated but given her history she may benefit from a short-term initiation of anticoagulation  If her primary care physician is unable to provide us with that guidance then we could consider a consult to  Hematology to help guide us in postoperative care  · Follow up post operatively with Margarita Castañeda PA-C  ·  patient did ask if there is any medical management of her pain that she can consider  Beyond  Nonsteroidal anti-inflammatories which she has been taking without success  I did offer her a referral to medical pain management and she said she will discuss this with her primary care physician      Subjective:   Patient ID: Erlin Samuel is a 56 y o  female      The patient presents with a chief complaint of left shoulder pain  The pain began 7 month(s) ago and is associated with an acute injury  Patient states that her pain has been increasing over the past couple months due to a fall that she sustained on 7/4/2020  She had an ORIF surgery to fix a fractured wrist in 7/4/2020 and states that she did not notice the shoulder pain much until recently  The patient describes the pain as aching, dull and sharp in intensity,  constant in timing, and localizes the pain to the  left glenohumeral joint, deltoid, globally  The pain is worse with movement, overhead work, overuse and raising arm over head and relieved by rest, ice, avoiding the painful activities  The pain is not associated with numbness and tingling  The pain is not associated with constitutional symptoms  The patient is awoken at night by the pain  The patient has had prior treatment in the form of a cortisone injection on 1/22/2021 by Dr Ara Crespo with benefit for only 1 day  She has been performing virtual PT/HEP without benefit  She states that her pain is affecting her daily activities  The following portions of the patient's history were reviewed and updated as appropriate: allergies, current medications, past family history, past medical history, past social history, past surgical history and problem list     Review of Systems   Constitutional: Negative for chills, fever and unexpected weight change  HENT: Negative for hearing loss, nosebleeds and sore throat  Eyes: Negative for pain, redness and visual disturbance  Respiratory: Negative for cough, shortness of breath and wheezing  Cardiovascular: Negative for chest pain, palpitations and leg swelling  Gastrointestinal: Negative for abdominal distention, nausea and vomiting  Endocrine: Negative for polydipsia and polyuria  Genitourinary: Negative for dysuria and hematuria     Skin: Negative for rash and wound    Neurological: Negative for dizziness, numbness and headaches  Psychiatric/Behavioral: Negative for decreased concentration and suicidal ideas  Objective:  /82   Pulse 101   Ht 5' 8" (1 727 m)   Wt 102 kg (224 lb) Comment: verbal  BMI 34 06 kg/m²       Left Shoulder Exam     Tenderness   Left shoulder tenderness location: Diffuse  Range of Motion   External rotation: 30   Forward flexion: 130   Internal rotation 0 degrees: Sacrum     Muscle Strength   Abduction: 5/5     Tests   Drop arm: negative    Other   Erythema: absent  Sensation: normal  Pulse: present             Physical Exam  Vitals signs and nursing note reviewed  Constitutional:       Appearance: She is well-developed  HENT:      Head: Normocephalic and atraumatic  Eyes:      Pupils: Pupils are equal, round, and reactive to light  Neck:      Musculoskeletal: Normal range of motion and neck supple  Cardiovascular:      Rate and Rhythm: Normal rate and regular rhythm  Heart sounds: Normal heart sounds  Pulmonary:      Effort: Pulmonary effort is normal  No respiratory distress  Breath sounds: Normal breath sounds  Abdominal:      General: There is no distension  Palpations: Abdomen is soft  Skin:     General: Skin is warm and dry  Neurological:      Mental Status: She is alert and oriented to person, place, and time  Psychiatric:         Behavior: Behavior normal          Thought Content: Thought content normal          Judgment: Judgment normal            I have personally reviewed pertinent films in PACS and my interpretation is as follows  X Ray Left Shoulder 2/8/2021: Advanced glenohumeral joint osteoarthritis  No other acute osseous abnormality       Scribe Attestation    I,:  Marbin Richey am acting as a scribe while in the presence of the attending physician :       I,:  Marin Zheng MD personally performed the services described in this documentation    as scribed in my presence :

## 2021-02-08 NOTE — PATIENT INSTRUCTIONS
What to Expect Before and After Shoulder Replacement Surgery  You are being scheduled for a shoulder replacement by Dr Tea Nguyen to treat your shoulder condition  Here is some information which may help to answer questions that you may have  Please do not hesitate to reach out to our team to answer questions not addressed here  Before Surgery  You will be contacted the evening prior to your surgery to confirm the scheduled time of the procedure and when to arrive at the hospital    Do not eat or drink anything after midnight the night before your surgery so that the anesthesia can be performed safely  If you have been fitted for a sling in the office prior to the surgery please remember to bring it to the hospital   You will meet the anesthesiologist the morning of the surgery  The surgery is performed under a general anesthetic but they will also offer you a regional block (shot to numb the arm) to help control your post-operative pain as well as with a catheter that is left in place after the block  The catheter is connected to a small pump which will continue to provide numbing medicine and help prolong the pain control from the block  Unfortunately this catheter is not as effective as the initial block, but can still be very helpful in managing the pain  After Surgery and in the Hospital  The shoulder replacement surgery typically takes 60-90 minutes  When surgery is completed, your surgeon will update your family and friends on your condition and progress  You will remain in the recovery room for at least an hour or until the anesthesia has worn off and your blood pressure and pulse are stable  If you have pain, the nurses will give you medication  Once out of surgery, your surgeon will decide on how long you will be using a sling in order to protect and position your shoulder  However, this won't keep you from starting physical therapy    Exercises typically begin on the day after surgery with emphasis on moving the shoulder, wrist, and hand  The physical therapist will be provided with a detailed protocol but typically the first 6 weeks are used to regain range of motion and then strengthening is initiated  Starting strengthening exercises too early may lead to complications  When You Are Discharged from the [address] [place] [address] can expect to be released from the hospital the day after surgery (this may change if you have special needs or medical conditions)  Before you are released, the treatment team (Orthopaedic surgery residents, physician assistants and physical therapists) will talk with you about the importance of limiting any sudden or stressful movements to the arm for several weeks or longer  Activities that involve pushing, pulling, and lifting should not be done until you are given permission from your surgeon  Your First Day at [address] [place] [address] may need help with your daily activities, so it is a good idea to have family and friends prepared to help you  It is okay to remove the sling and let the arm hang at the side so that you can get cleaned and change your clothes  To put on a shirt, place the bad arm in first and then the good arm  Reverse to take it off  Don't forget to wear the sling every night for at least the first month after surgery, and never use your arm to push yourself up in bed or from a chair  The added weight on your shoulder may cause you to re-injure the joint  How to [place] in the First Week  You are encouraged to return to your normal eating and sleeping patterns as soon as possible  It is important for you to be active in order to control your weight and muscle tone  It is ok to increase your activity level and even perform light aerobic exercise (like walking or riding a stationary bike) within the first week or so if you are feeling up to it    If there is concern about these activates best to wait until the first post-operative visit and discuss this with the team    Veron[address] 23 might be able to return to work within several days if you can perform your job while wearing a sling  Consult with your doctor, as this differs from patient to patient  However, if your job requires heavy lifting or climbing, there may be a delay for several months  Until you are seen for your first follow-up visit, please try and keep wound dry  It is okay to shower but try your best to keep the incision out of direct contact with the water (or consider using a waterproof bandage)  If it does gets wet please dry as best as possible afterwards  If you notice any drainage or a foul odor from your incision or your temperature goes above 101 5 degrees, please contact the office  What You Can Expect in the First Month  Your first post-operative appointment will be with Dr Octavia Lee physician assistant (PA) around 2 weeks after the surgery  You skin staples will be removed and X-rays will be obtained at that visit  Please understand that it is quite common to still experience pain at that time but the pain should be steadily improving  Hopefully physical therapy has already begun but if not it will be initiated at this visit and will continue for the 8-12 weeks  At about 12 weeks after surgery you will start a progressive strengthening program  Physical therapy is a deliberate process of not only strengthening your shoulder but also altering how you use your arm  It may be many months before your desired results are achieved, so do not get discouraged  Your shoulder will generally continue to improve steadily up to 6-8 months after surgery  After that point further improvement is very slow; although it has been shown that even after a year or more, activity can increase as muscle strength continues to improve  After the First Month at Home   Because each person heals differently, there are different recovery timelines  An average recovery period typically lasts about between 3-6 months  Talk with your surgeon about which activities will be appropriate for you once you have recovered

## 2021-02-11 ENCOUNTER — TELEPHONE (OUTPATIENT)
Dept: PHYSICAL THERAPY | Facility: REHABILITATION | Age: 61
End: 2021-02-11

## 2021-02-19 ENCOUNTER — DOCUMENTATION (OUTPATIENT)
Dept: CASE MANAGEMENT | Facility: OTHER | Age: 61
End: 2021-02-19

## 2021-02-19 NOTE — MEDICAL HIGH UTILIZER
Patient Name: Rosalia Stout [facility] BROWN DEER JWS: [id number]       : 1960     Age: 56       Sex:  F   Utilization Background: She is a female with a history of migraine, Vermillions disease, Bipolar, Depression, Fibromyalgia, and HTN who presents to [facility] with migraine  She has 14 ER visits, 9 admission, and 2 transfers to [facility] for Ketamine Infusions in 2019  Discussed with Neurology reveals that patient does not feel much better even after prolonged hospitalization         Treatment Recommendations:  ED Recommendations: Recommendations as per neurology: Give migraine protocol: using steroid protocol d/t toradol allergy  Recommend calling her [facility] Neurologist to schedule close outpatient follow up  It is noted that the patient will provide other complaints to the ER providers to get admitted and then will complain of migraine in the hospital    Would not offer transfer for Ketamine Infusion to this patient as it has not worked in the past  This should be left up to Neurology to determine if this is appropriate         Inpatient Recommendations: Early consultation with neurology  Avoid narcotics/sedating agents due to over sedation in the past         Outpatient recommendations: Needs close follow up with [facility] Neurology  Needs CM to make sure that patient does not run out of her meds as she has in the past     Situation: Patient is a patient who presents frequently for migraines  It seems that she has started using other chief complaints to get admitted to the hospital for migraine treatment   As per neurology colleague her headache symptomatology does not usually change with treatment        PMH/PSH:  Migraine, Depression, Bipolar, Fibromyalgia, HTN, Hx of DVT      Assessment                              Drivers of repeated utilization:                 Symptomatology      Community Resources in place:    None - she has mentioned that she does not have a  for infusions May need assistance with transportation and with medications   Patient Care Team:   Madison Zurita DO - PCP [facility])  Jermain Rene MD - Neurology [facility])  OP Care Manager: Momo Scruggs RN                    Care plan date and owner: Stuart Fernandez PA-C 10/31/2019         Reviewed with patient before discharge?

## 2021-02-20 ENCOUNTER — HOSPITAL ENCOUNTER (OUTPATIENT)
Dept: CT IMAGING | Facility: HOSPITAL | Age: 61
Discharge: HOME | End: 2021-02-20
Attending: ORTHOPAEDIC SURGERY
Payer: COMMERCIAL

## 2021-02-20 DIAGNOSIS — M25.512 LEFT SHOULDER PAIN, UNSPECIFIED CHRONICITY: ICD-10-CM

## 2021-02-20 DIAGNOSIS — M19.012 PRIMARY OSTEOARTHRITIS OF LEFT SHOULDER: ICD-10-CM

## 2021-02-20 PROCEDURE — 73200 CT UPPER EXTREMITY W/O DYE: CPT

## 2021-02-20 PROCEDURE — G1004 CDSM NDSC: HCPCS

## 2021-03-01 NOTE — PRE-PROCEDURE INSTRUCTIONS
Pre-Surgery Instructions:   Medication Instructions    acetaminophen (TYLENOL) 500 mg tablet Instructed patient per Anesthesia Guidelines   apixaban (ELIQUIS) 5 mg Patient was instructed to contact Physician for medication instruction  Med form sent Dr Ilsa Foster per pt as prescribing MD 3/1 soc rn    ARIPiprazole (ABILIFY) 15 mg tablet Instructed patient per Anesthesia Guidelines   cholecalciferol (VITAMIN D3) 1,000 units tablet Instructed patient per Anesthesia Guidelines   Erenumab-aooe (AIMOVIG) 140 MG/ML SOAJ Instructed patient per Anesthesia Guidelines   ferrous sulfate 325 (65 Fe) mg tablet Instructed patient per Anesthesia Guidelines   fluvoxaMINE (LUVOX) 50 mg tablet Instructed patient per Anesthesia Guidelines   gabapentin (NEURONTIN) 300 mg capsule Instructed patient per Anesthesia Guidelines   lamoTRIgine (LaMICtal) 200 MG tablet Instructed patient per Anesthesia Guidelines   LORazepam (ATIVAN) 1 mg tablet Instructed patient per Anesthesia Guidelines   meclizine (ANTIVERT) 12 5 MG tablet Instructed patient per Anesthesia Guidelines   ondansetron (ZOFRAN) 4 mg tablet Instructed patient per Anesthesia Guidelines  Pre procedure instructions reviewed verbalizes understanding  NPO after MN  Morning meds with water  Bathing reviewed  Bring Sling dos Stop NSAIDS one week prior  Pt to call Dr Ilsa Foster ([facility]) for Eliquis instructions 3/1

## 2021-03-17 ENCOUNTER — APPOINTMENT (OUTPATIENT)
Dept: RADIOLOGY | Facility: MEDICAL CENTER | Age: 61
End: 2021-03-17
Payer: COMMERCIAL

## 2021-03-17 ENCOUNTER — TELEPHONE (OUTPATIENT)
Dept: OBGYN CLINIC | Facility: HOSPITAL | Age: 61
End: 2021-03-17

## 2021-03-17 ENCOUNTER — LAB REQUISITION (OUTPATIENT)
Dept: LAB | Facility: HOSPITAL | Age: 61
End: 2021-03-17
Payer: COMMERCIAL

## 2021-03-17 ENCOUNTER — APPOINTMENT (OUTPATIENT)
Dept: LAB | Facility: MEDICAL CENTER | Age: 61
End: 2021-03-17
Payer: COMMERCIAL

## 2021-03-17 DIAGNOSIS — M25.512 LEFT SHOULDER PAIN, UNSPECIFIED CHRONICITY: ICD-10-CM

## 2021-03-17 DIAGNOSIS — E27.1 ADRENAL INSUFFICIENCY (ADDISON'S DISEASE) (HCC): ICD-10-CM

## 2021-03-17 DIAGNOSIS — M19.012 PRIMARY OSTEOARTHRITIS OF LEFT SHOULDER: ICD-10-CM

## 2021-03-17 DIAGNOSIS — M19.012 PRIMARY OSTEOARTHRITIS, LEFT SHOULDER: ICD-10-CM

## 2021-03-17 DIAGNOSIS — I26.99 PE (PULMONARY THROMBOEMBOLISM) (HCC): ICD-10-CM

## 2021-03-17 LAB
ABO GROUP BLD: NORMAL
ALBUMIN SERPL BCP-MCNC: 3.4 G/DL (ref 3.5–5)
ALP SERPL-CCNC: 152 U/L (ref 46–116)
ALT SERPL W P-5'-P-CCNC: 19 U/L (ref 12–78)
ANION GAP SERPL CALCULATED.3IONS-SCNC: 4 MMOL/L (ref 4–13)
APTT PPP: 30 SECONDS (ref 23–37)
AST SERPL W P-5'-P-CCNC: 17 U/L (ref 5–45)
BASOPHILS # BLD AUTO: 0.06 THOUSANDS/ΜL (ref 0–0.1)
BASOPHILS NFR BLD AUTO: 1 % (ref 0–1)
BILIRUB SERPL-MCNC: 0.29 MG/DL (ref 0.2–1)
BLD GP AB SCN SERPL QL: NEGATIVE
BUN SERPL-MCNC: 12 MG/DL (ref 5–25)
CALCIUM ALBUM COR SERPL-MCNC: 9.8 MG/DL (ref 8.3–10.1)
CALCIUM SERPL-MCNC: 9.3 MG/DL (ref 8.3–10.1)
CHLORIDE SERPL-SCNC: 108 MMOL/L (ref 100–108)
CO2 SERPL-SCNC: 30 MMOL/L (ref 21–32)
CREAT SERPL-MCNC: 0.97 MG/DL (ref 0.6–1.3)
EOSINOPHIL # BLD AUTO: 0.26 THOUSAND/ΜL (ref 0–0.61)
EOSINOPHIL NFR BLD AUTO: 3 % (ref 0–6)
ERYTHROCYTE [DISTWIDTH] IN BLOOD BY AUTOMATED COUNT: 15.9 % (ref 11.6–15.1)
EST. AVERAGE GLUCOSE BLD GHB EST-MCNC: 105 MG/DL
GFR SERPL CREATININE-BSD FRML MDRD: 63 ML/MIN/1.73SQ M
GLUCOSE SERPL-MCNC: 90 MG/DL (ref 65–140)
HBA1C MFR BLD: 5.3 %
HCT VFR BLD AUTO: 40.1 % (ref 34.8–46.1)
HGB BLD-MCNC: 13.1 G/DL (ref 11.5–15.4)
IMM GRANULOCYTES # BLD AUTO: 0.03 THOUSAND/UL (ref 0–0.2)
IMM GRANULOCYTES NFR BLD AUTO: 0 % (ref 0–2)
INR PPP: 1.15 (ref 0.84–1.19)
LYMPHOCYTES # BLD AUTO: 2.69 THOUSANDS/ΜL (ref 0.6–4.47)
LYMPHOCYTES NFR BLD AUTO: 28 % (ref 14–44)
MCH RBC QN AUTO: 30.3 PG (ref 26.8–34.3)
MCHC RBC AUTO-ENTMCNC: 32.7 G/DL (ref 31.4–37.4)
MCV RBC AUTO: 93 FL (ref 82–98)
MONOCYTES # BLD AUTO: 0.88 THOUSAND/ΜL (ref 0.17–1.22)
MONOCYTES NFR BLD AUTO: 9 % (ref 4–12)
NEUTROPHILS # BLD AUTO: 5.69 THOUSANDS/ΜL (ref 1.85–7.62)
NEUTS SEG NFR BLD AUTO: 59 % (ref 43–75)
NRBC BLD AUTO-RTO: 0 /100 WBCS
PLATELET # BLD AUTO: 326 THOUSANDS/UL (ref 149–390)
PMV BLD AUTO: 9.7 FL (ref 8.9–12.7)
POTASSIUM SERPL-SCNC: 2.8 MMOL/L (ref 3.5–5.3)
PROT SERPL-MCNC: 7.2 G/DL (ref 6.4–8.2)
PROTHROMBIN TIME: 14.8 SECONDS (ref 11.6–14.5)
RBC # BLD AUTO: 4.32 MILLION/UL (ref 3.81–5.12)
RH BLD: POSITIVE
SODIUM SERPL-SCNC: 142 MMOL/L (ref 136–145)
SPECIMEN EXPIRATION DATE: NORMAL
WBC # BLD AUTO: 9.61 THOUSAND/UL (ref 4.31–10.16)

## 2021-03-17 PROCEDURE — 71046 X-RAY EXAM CHEST 2 VIEWS: CPT

## 2021-03-17 PROCEDURE — 85730 THROMBOPLASTIN TIME PARTIAL: CPT

## 2021-03-17 PROCEDURE — 86900 BLOOD TYPING SEROLOGIC ABO: CPT | Performed by: ORTHOPAEDIC SURGERY

## 2021-03-17 PROCEDURE — 83036 HEMOGLOBIN GLYCOSYLATED A1C: CPT

## 2021-03-17 PROCEDURE — 86850 RBC ANTIBODY SCREEN: CPT | Performed by: ORTHOPAEDIC SURGERY

## 2021-03-17 PROCEDURE — 85025 COMPLETE CBC W/AUTO DIFF WBC: CPT

## 2021-03-17 PROCEDURE — 85610 PROTHROMBIN TIME: CPT

## 2021-03-17 PROCEDURE — 80053 COMPREHEN METABOLIC PANEL: CPT

## 2021-03-17 PROCEDURE — 86901 BLOOD TYPING SEROLOGIC RH(D): CPT | Performed by: ORTHOPAEDIC SURGERY

## 2021-03-17 PROCEDURE — 36415 COLL VENOUS BLD VENIPUNCTURE: CPT

## 2021-03-17 NOTE — TELEPHONE ENCOUNTER
Patient sees Dr Fely Crane from [facility] is calling to provide an authorization for patients surgery  Auth # [id number] CPT [id number] approved for 3/30/21      Call with any questions to:  [phone]: [phone]

## 2021-03-18 ENCOUNTER — ANESTHESIA EVENT (OUTPATIENT)
Dept: PERIOP | Facility: HOSPITAL | Age: 61
DRG: 483 | End: 2021-03-18
Payer: COMMERCIAL

## 2021-03-23 DIAGNOSIS — I26.99 PE (PULMONARY THROMBOEMBOLISM) (HCC): ICD-10-CM

## 2021-03-23 DIAGNOSIS — M19.012 PRIMARY OSTEOARTHRITIS OF LEFT SHOULDER: ICD-10-CM

## 2021-03-23 DIAGNOSIS — M25.512 LEFT SHOULDER PAIN, UNSPECIFIED CHRONICITY: ICD-10-CM

## 2021-03-23 DIAGNOSIS — E27.1 ADRENAL INSUFFICIENCY (ADDISON'S DISEASE) (HCC): ICD-10-CM

## 2021-03-23 PROCEDURE — U0003 INFECTIOUS AGENT DETECTION BY NUCLEIC ACID (DNA OR RNA); SEVERE ACUTE RESPIRATORY SYNDROME CORONAVIRUS 2 (SARS-COV-2) (CORONAVIRUS DISEASE [COVID-19]), AMPLIFIED PROBE TECHNIQUE, MAKING USE OF HIGH THROUGHPUT TECHNOLOGIES AS DESCRIBED BY CMS-2020-01-R: HCPCS

## 2021-03-23 PROCEDURE — U0005 INFEC AGEN DETEC AMPLI PROBE: HCPCS

## 2021-03-24 LAB — SARS-COV-2 RNA RESP QL NAA+PROBE: NEGATIVE

## 2021-03-30 ENCOUNTER — HOSPITAL ENCOUNTER (INPATIENT)
Facility: HOSPITAL | Age: 61
LOS: 7 days | DRG: 483 | End: 2021-04-06
Attending: ORTHOPAEDIC SURGERY | Admitting: ORTHOPAEDIC SURGERY
Payer: COMMERCIAL

## 2021-03-30 ENCOUNTER — ANESTHESIA (OUTPATIENT)
Dept: PERIOP | Facility: HOSPITAL | Age: 61
DRG: 483 | End: 2021-03-30
Payer: COMMERCIAL

## 2021-03-30 ENCOUNTER — APPOINTMENT (OUTPATIENT)
Dept: RADIOLOGY | Facility: HOSPITAL | Age: 61
DRG: 483 | End: 2021-03-30
Payer: COMMERCIAL

## 2021-03-30 DIAGNOSIS — Z96.612 STATUS POST TOTAL SHOULDER ARTHROPLASTY, LEFT: Primary | ICD-10-CM

## 2021-03-30 DIAGNOSIS — F31.9 BIPOLAR AFFECTIVE DISORDER, REMISSION STATUS UNSPECIFIED (HCC): ICD-10-CM

## 2021-03-30 DIAGNOSIS — M19.012 PRIMARY OSTEOARTHRITIS OF LEFT SHOULDER: ICD-10-CM

## 2021-03-30 DIAGNOSIS — Z86.59 HISTORY OF ANXIETY: ICD-10-CM

## 2021-03-30 LAB — GLUCOSE SERPL-MCNC: 101 MG/DL (ref 65–140)

## 2021-03-30 PROCEDURE — 23472 RECONSTRUCT SHOULDER JOINT: CPT | Performed by: ORTHOPAEDIC SURGERY

## 2021-03-30 PROCEDURE — 0LS40ZZ REPOSITION LEFT UPPER ARM TENDON, OPEN APPROACH: ICD-10-PCS | Performed by: ORTHOPAEDIC SURGERY

## 2021-03-30 PROCEDURE — NC001 PR NO CHARGE: Performed by: ORTHOPAEDIC SURGERY

## 2021-03-30 PROCEDURE — C1776 JOINT DEVICE (IMPLANTABLE): HCPCS | Performed by: ORTHOPAEDIC SURGERY

## 2021-03-30 PROCEDURE — 82948 REAGENT STRIP/BLOOD GLUCOSE: CPT

## 2021-03-30 PROCEDURE — C9290 INJ, BUPIVACAINE LIPOSOME: HCPCS | Performed by: ANESTHESIOLOGY

## 2021-03-30 PROCEDURE — 23472 RECONSTRUCT SHOULDER JOINT: CPT | Performed by: PHYSICIAN ASSISTANT

## 2021-03-30 PROCEDURE — 0RRK0JZ REPLACEMENT OF LEFT SHOULDER JOINT WITH SYNTHETIC SUBSTITUTE, OPEN APPROACH: ICD-10-PCS | Performed by: ORTHOPAEDIC SURGERY

## 2021-03-30 PROCEDURE — 73030 X-RAY EXAM OF SHOULDER: CPT

## 2021-03-30 DEVICE — IMPLANTABLE DEVICE
Type: IMPLANTABLE DEVICE | Site: SHOULDER | Status: FUNCTIONAL
Brand: AEQUALIS™ PERFORM+

## 2021-03-30 DEVICE — IMPLANTABLE DEVICE
Type: IMPLANTABLE DEVICE | Site: SHOULDER | Status: FUNCTIONAL
Brand: FLEX SHOULDER SYSTEM

## 2021-03-30 DEVICE — IMPLANTABLE DEVICE
Type: IMPLANTABLE DEVICE | Site: SHOULDER | Status: FUNCTIONAL
Brand: AEQUALIS™ ASCEND™ FLEX

## 2021-03-30 RX ORDER — DEXAMETHASONE SODIUM PHOSPHATE 10 MG/ML
INJECTION, SOLUTION INTRAMUSCULAR; INTRAVENOUS AS NEEDED
Status: DISCONTINUED | OUTPATIENT
Start: 2021-03-30 | End: 2021-03-30

## 2021-03-30 RX ORDER — FERROUS SULFATE 325(65) MG
325 TABLET ORAL
Status: DISCONTINUED | OUTPATIENT
Start: 2021-03-31 | End: 2021-04-06 | Stop reason: HOSPADM

## 2021-03-30 RX ORDER — PROPOFOL 10 MG/ML
INJECTION, EMULSION INTRAVENOUS AS NEEDED
Status: DISCONTINUED | OUTPATIENT
Start: 2021-03-30 | End: 2021-03-30

## 2021-03-30 RX ORDER — GABAPENTIN 300 MG/1
300 CAPSULE ORAL 3 TIMES DAILY
Status: DISCONTINUED | OUTPATIENT
Start: 2021-03-30 | End: 2021-04-06 | Stop reason: HOSPADM

## 2021-03-30 RX ORDER — SODIUM CHLORIDE, SODIUM LACTATE, POTASSIUM CHLORIDE, CALCIUM CHLORIDE 600; 310; 30; 20 MG/100ML; MG/100ML; MG/100ML; MG/100ML
125 INJECTION, SOLUTION INTRAVENOUS CONTINUOUS
Status: DISCONTINUED | OUTPATIENT
Start: 2021-03-30 | End: 2021-04-01

## 2021-03-30 RX ORDER — LAMOTRIGINE 100 MG/1
200 TABLET ORAL DAILY
Status: DISCONTINUED | OUTPATIENT
Start: 2021-03-31 | End: 2021-04-06 | Stop reason: HOSPADM

## 2021-03-30 RX ORDER — MIDAZOLAM HYDROCHLORIDE 2 MG/2ML
INJECTION, SOLUTION INTRAMUSCULAR; INTRAVENOUS AS NEEDED
Status: DISCONTINUED | OUTPATIENT
Start: 2021-03-30 | End: 2021-03-30

## 2021-03-30 RX ORDER — ACETAMINOPHEN 500 MG
500 TABLET ORAL EVERY 6 HOURS PRN
Status: DISCONTINUED | OUTPATIENT
Start: 2021-03-30 | End: 2021-03-30

## 2021-03-30 RX ORDER — CALCIUM CARBONATE 200(500)MG
1000 TABLET,CHEWABLE ORAL DAILY PRN
Status: DISCONTINUED | OUTPATIENT
Start: 2021-03-30 | End: 2021-04-06 | Stop reason: HOSPADM

## 2021-03-30 RX ORDER — NEOSTIGMINE METHYLSULFATE 1 MG/ML
INJECTION INTRAVENOUS AS NEEDED
Status: DISCONTINUED | OUTPATIENT
Start: 2021-03-30 | End: 2021-03-30

## 2021-03-30 RX ORDER — MAGNESIUM HYDROXIDE 1200 MG/15ML
LIQUID ORAL AS NEEDED
Status: DISCONTINUED | OUTPATIENT
Start: 2021-03-30 | End: 2021-03-30 | Stop reason: HOSPADM

## 2021-03-30 RX ORDER — LIDOCAINE HYDROCHLORIDE 10 MG/ML
0.5 INJECTION, SOLUTION EPIDURAL; INFILTRATION; INTRACAUDAL; PERINEURAL ONCE AS NEEDED
Status: DISCONTINUED | OUTPATIENT
Start: 2021-03-30 | End: 2021-03-30 | Stop reason: HOSPADM

## 2021-03-30 RX ORDER — OXYCODONE HYDROCHLORIDE 5 MG/1
5 TABLET ORAL EVERY 4 HOURS PRN
Status: DISCONTINUED | OUTPATIENT
Start: 2021-03-30 | End: 2021-04-01

## 2021-03-30 RX ORDER — POTASSIUM CHLORIDE 20 MEQ/1
40 TABLET, EXTENDED RELEASE ORAL DAILY
Status: DISCONTINUED | OUTPATIENT
Start: 2021-03-30 | End: 2021-04-06 | Stop reason: HOSPADM

## 2021-03-30 RX ORDER — LIDOCAINE HYDROCHLORIDE 10 MG/ML
INJECTION, SOLUTION EPIDURAL; INFILTRATION; INTRACAUDAL; PERINEURAL AS NEEDED
Status: DISCONTINUED | OUTPATIENT
Start: 2021-03-30 | End: 2021-03-30

## 2021-03-30 RX ORDER — ROCURONIUM BROMIDE 10 MG/ML
INJECTION, SOLUTION INTRAVENOUS AS NEEDED
Status: DISCONTINUED | OUTPATIENT
Start: 2021-03-30 | End: 2021-03-30

## 2021-03-30 RX ORDER — HYDROMORPHONE HCL/PF 1 MG/ML
0.5 SYRINGE (ML) INJECTION
Status: COMPLETED | OUTPATIENT
Start: 2021-03-30 | End: 2021-03-30

## 2021-03-30 RX ORDER — POTASSIUM CHLORIDE 20 MEQ/1
20 TABLET, EXTENDED RELEASE ORAL DAILY
COMMUNITY

## 2021-03-30 RX ORDER — CEFAZOLIN SODIUM 2 G/50ML
2000 SOLUTION INTRAVENOUS ONCE
Status: COMPLETED | OUTPATIENT
Start: 2021-03-30 | End: 2021-03-30

## 2021-03-30 RX ORDER — ACETAMINOPHEN 325 MG/1
650 TABLET ORAL EVERY 6 HOURS PRN
Status: DISCONTINUED | OUTPATIENT
Start: 2021-03-30 | End: 2021-04-01

## 2021-03-30 RX ORDER — FENTANYL CITRATE 50 UG/ML
INJECTION, SOLUTION INTRAMUSCULAR; INTRAVENOUS AS NEEDED
Status: DISCONTINUED | OUTPATIENT
Start: 2021-03-30 | End: 2021-03-30

## 2021-03-30 RX ORDER — CHLORHEXIDINE GLUCONATE 0.12 MG/ML
15 RINSE ORAL ONCE
Status: COMPLETED | OUTPATIENT
Start: 2021-03-30 | End: 2021-03-30

## 2021-03-30 RX ORDER — ARIPIPRAZOLE 5 MG/1
5 TABLET ORAL DAILY
Status: DISCONTINUED | OUTPATIENT
Start: 2021-03-31 | End: 2021-04-06 | Stop reason: HOSPADM

## 2021-03-30 RX ORDER — ONDANSETRON 2 MG/ML
INJECTION INTRAMUSCULAR; INTRAVENOUS AS NEEDED
Status: DISCONTINUED | OUTPATIENT
Start: 2021-03-30 | End: 2021-03-30

## 2021-03-30 RX ORDER — CEFAZOLIN SODIUM 2 G/50ML
2000 SOLUTION INTRAVENOUS EVERY 8 HOURS
Status: COMPLETED | OUTPATIENT
Start: 2021-03-30 | End: 2021-03-31

## 2021-03-30 RX ORDER — METOCLOPRAMIDE HYDROCHLORIDE 5 MG/ML
10 INJECTION INTRAMUSCULAR; INTRAVENOUS ONCE AS NEEDED
Status: DISCONTINUED | OUTPATIENT
Start: 2021-03-30 | End: 2021-03-30 | Stop reason: HOSPADM

## 2021-03-30 RX ORDER — OXYCODONE HYDROCHLORIDE 5 MG/1
TABLET ORAL
Qty: 13 TABLET | Refills: 0 | Status: ON HOLD | OUTPATIENT
Start: 2021-03-30 | End: 2021-04-10 | Stop reason: SDUPTHER

## 2021-03-30 RX ORDER — MECLIZINE HCL 12.5 MG/1
12.5 TABLET ORAL EVERY 8 HOURS PRN
Status: DISCONTINUED | OUTPATIENT
Start: 2021-03-30 | End: 2021-04-06 | Stop reason: HOSPADM

## 2021-03-30 RX ORDER — BUPIVACAINE HYDROCHLORIDE 5 MG/ML
INJECTION, SOLUTION PERINEURAL AS NEEDED
Status: DISCONTINUED | OUTPATIENT
Start: 2021-03-30 | End: 2021-03-30

## 2021-03-30 RX ORDER — ONDANSETRON 2 MG/ML
4 INJECTION INTRAMUSCULAR; INTRAVENOUS EVERY 6 HOURS PRN
Status: DISCONTINUED | OUTPATIENT
Start: 2021-03-30 | End: 2021-04-06 | Stop reason: HOSPADM

## 2021-03-30 RX ORDER — FLUVOXAMINE MALEATE 50 MG/1
300 TABLET, COATED ORAL
Status: DISCONTINUED | OUTPATIENT
Start: 2021-03-30 | End: 2021-04-06 | Stop reason: HOSPADM

## 2021-03-30 RX ORDER — LORAZEPAM 1 MG/1
2 TABLET ORAL 4 TIMES DAILY
Status: DISCONTINUED | OUTPATIENT
Start: 2021-03-30 | End: 2021-04-06 | Stop reason: HOSPADM

## 2021-03-30 RX ORDER — MELATONIN
5000 DAILY
Status: DISCONTINUED | OUTPATIENT
Start: 2021-03-30 | End: 2021-04-06 | Stop reason: HOSPADM

## 2021-03-30 RX ORDER — GLYCOPYRROLATE 0.2 MG/ML
INJECTION INTRAMUSCULAR; INTRAVENOUS AS NEEDED
Status: DISCONTINUED | OUTPATIENT
Start: 2021-03-30 | End: 2021-03-30

## 2021-03-30 RX ORDER — SODIUM CHLORIDE, SODIUM LACTATE, POTASSIUM CHLORIDE, CALCIUM CHLORIDE 600; 310; 30; 20 MG/100ML; MG/100ML; MG/100ML; MG/100ML
100 INJECTION, SOLUTION INTRAVENOUS CONTINUOUS
Status: DISPENSED | OUTPATIENT
Start: 2021-03-30 | End: 2021-03-31

## 2021-03-30 RX ORDER — EPHEDRINE SULFATE 50 MG/ML
INJECTION INTRAVENOUS AS NEEDED
Status: DISCONTINUED | OUTPATIENT
Start: 2021-03-30 | End: 2021-03-30

## 2021-03-30 RX ORDER — ONDANSETRON 2 MG/ML
4 INJECTION INTRAMUSCULAR; INTRAVENOUS ONCE AS NEEDED
Status: DISCONTINUED | OUTPATIENT
Start: 2021-03-30 | End: 2021-03-30 | Stop reason: HOSPADM

## 2021-03-30 RX ORDER — OXYCODONE HYDROCHLORIDE 10 MG/1
10 TABLET ORAL EVERY 4 HOURS PRN
Status: DISCONTINUED | OUTPATIENT
Start: 2021-03-30 | End: 2021-04-01

## 2021-03-30 RX ORDER — DOCUSATE SODIUM 100 MG/1
100 CAPSULE, LIQUID FILLED ORAL 2 TIMES DAILY
Status: DISCONTINUED | OUTPATIENT
Start: 2021-03-30 | End: 2021-04-02

## 2021-03-30 RX ORDER — MORPHINE SULFATE 10 MG/ML
2 INJECTION, SOLUTION INTRAMUSCULAR; INTRAVENOUS EVERY 2 HOUR PRN
Status: DISCONTINUED | OUTPATIENT
Start: 2021-03-30 | End: 2021-04-01

## 2021-03-30 RX ADMIN — CEFAZOLIN SODIUM 2000 MG: 2 SOLUTION INTRAVENOUS at 09:51

## 2021-03-30 RX ADMIN — EPHEDRINE SULFATE 15 MG: 50 INJECTION, SOLUTION INTRAVENOUS at 10:29

## 2021-03-30 RX ADMIN — BUPIVACAINE HYDROCHLORIDE 7.5 ML: 5 INJECTION, SOLUTION PERINEURAL at 09:40

## 2021-03-30 RX ADMIN — CEFAZOLIN SODIUM 2000 MG: 2 SOLUTION INTRAVENOUS at 17:42

## 2021-03-30 RX ADMIN — LIDOCAINE HYDROCHLORIDE 50 MG: 10 INJECTION, SOLUTION EPIDURAL; INFILTRATION; INTRACAUDAL; PERINEURAL at 09:57

## 2021-03-30 RX ADMIN — GABAPENTIN 300 MG: 300 CAPSULE ORAL at 16:28

## 2021-03-30 RX ADMIN — SODIUM CHLORIDE, SODIUM LACTATE, POTASSIUM CHLORIDE, AND CALCIUM CHLORIDE 125 ML/HR: .6; .31; .03; .02 INJECTION, SOLUTION INTRAVENOUS at 14:42

## 2021-03-30 RX ADMIN — BUPIVACAINE 20 ML: 13.3 INJECTION, SUSPENSION, LIPOSOMAL INFILTRATION at 09:40

## 2021-03-30 RX ADMIN — GABAPENTIN 300 MG: 300 CAPSULE ORAL at 20:36

## 2021-03-30 RX ADMIN — FLUVOXAMINE MALEATE 300 MG: 50 TABLET ORAL at 22:14

## 2021-03-30 RX ADMIN — CHLORHEXIDINE GLUCONATE 0.12% ORAL RINSE 15 ML: 1.2 LIQUID ORAL at 08:07

## 2021-03-30 RX ADMIN — LORAZEPAM 2 MG: 1 TABLET ORAL at 22:14

## 2021-03-30 RX ADMIN — PROPOFOL 150 MG: 10 INJECTION, EMULSION INTRAVENOUS at 09:57

## 2021-03-30 RX ADMIN — HYDROMORPHONE HYDROCHLORIDE 0.5 MG: 1 INJECTION, SOLUTION INTRAMUSCULAR; INTRAVENOUS; SUBCUTANEOUS at 13:28

## 2021-03-30 RX ADMIN — MORPHINE SULFATE 2 MG: 10 INJECTION INTRAVENOUS at 22:17

## 2021-03-30 RX ADMIN — LORAZEPAM 2 MG: 1 TABLET ORAL at 17:42

## 2021-03-30 RX ADMIN — FENTANYL CITRATE 50 MCG: 50 INJECTION INTRAMUSCULAR; INTRAVENOUS at 09:57

## 2021-03-30 RX ADMIN — DEXAMETHASONE SODIUM PHOSPHATE 10 MG: 10 INJECTION, SOLUTION INTRAMUSCULAR; INTRAVENOUS at 09:57

## 2021-03-30 RX ADMIN — ROCURONIUM BROMIDE 40 MG: 50 INJECTION, SOLUTION INTRAVENOUS at 09:57

## 2021-03-30 RX ADMIN — FENTANYL CITRATE 50 MCG: 50 INJECTION INTRAMUSCULAR; INTRAVENOUS at 10:55

## 2021-03-30 RX ADMIN — HYDROMORPHONE HYDROCHLORIDE 0.5 MG: 1 INJECTION, SOLUTION INTRAMUSCULAR; INTRAVENOUS; SUBCUTANEOUS at 12:52

## 2021-03-30 RX ADMIN — NEOSTIGMINE METHYLSULFATE 3 MG: 1 INJECTION INTRAVENOUS at 11:21

## 2021-03-30 RX ADMIN — POTASSIUM CHLORIDE 40 MEQ: 1500 TABLET, EXTENDED RELEASE ORAL at 16:28

## 2021-03-30 RX ADMIN — DOCUSATE SODIUM 100 MG: 100 CAPSULE, LIQUID FILLED ORAL at 17:42

## 2021-03-30 RX ADMIN — HYDROMORPHONE HYDROCHLORIDE 0.5 MG: 1 INJECTION, SOLUTION INTRAMUSCULAR; INTRAVENOUS; SUBCUTANEOUS at 12:33

## 2021-03-30 RX ADMIN — ONDANSETRON 4 MG: 2 INJECTION INTRAMUSCULAR; INTRAVENOUS at 09:57

## 2021-03-30 RX ADMIN — OXYCODONE HYDROCHLORIDE 10 MG: 10 TABLET ORAL at 16:33

## 2021-03-30 RX ADMIN — PHENYLEPHRINE HYDROCHLORIDE 200 MCG: 10 INJECTION INTRAVENOUS at 10:33

## 2021-03-30 RX ADMIN — MIDAZOLAM 2 MG: 1 INJECTION INTRAMUSCULAR; INTRAVENOUS at 09:34

## 2021-03-30 RX ADMIN — GLYCOPYRROLATE 0.4 MG: 0.2 INJECTION, SOLUTION INTRAMUSCULAR; INTRAVENOUS at 11:21

## 2021-03-30 RX ADMIN — OXYCODONE HYDROCHLORIDE 10 MG: 10 TABLET ORAL at 20:36

## 2021-03-30 RX ADMIN — Medication 5000 UNITS: at 16:28

## 2021-03-30 RX ADMIN — HYDROMORPHONE HYDROCHLORIDE 0.5 MG: 1 INJECTION, SOLUTION INTRAMUSCULAR; INTRAVENOUS; SUBCUTANEOUS at 12:03

## 2021-03-30 RX ADMIN — PHENYLEPHRINE HYDROCHLORIDE 40 MCG/MIN: 10 INJECTION INTRAVENOUS at 10:36

## 2021-03-30 RX ADMIN — SODIUM CHLORIDE, SODIUM LACTATE, POTASSIUM CHLORIDE, AND CALCIUM CHLORIDE 125 ML/HR: .6; .31; .03; .02 INJECTION, SOLUTION INTRAVENOUS at 08:42

## 2021-03-30 NOTE — OP NOTE
OPERATIVE REPORT  PATIENT NAME: Clarisa Tran    :  1960  MRN: [id number]  Pt Location:  OR ROOM 15    SURGERY DATE: 3/30/2021     SURGEON: Urvashi Srinivasan MD     ASSISTANT: Hardie Phoenix PA-C     NOTE: Hardie Phoenix PA-C was present throughout the entire procedure and performed essential assistance with patient prepping, draping, positioning, retraction, implant trial insertion, final implant insertion, wound closure, sterile dressing application and sling application, all under my direct supervision  NOTE: No qualified resident physician was available for assistance    PREOPERATIVE DIAGNOSIS:  Left Shoulder Glenohumeral Osteoarthritis    POSTOPERATIVE DIAGNOSIS: Same    PROCEDURES: Left Total Shoulder Arthroplasty with Long Head Biceps Tenodesis    ANESTHESIA STAFF: Olivia Jeter MD     ANESTHESIA TYPE: General with endotracheal tube with ultrasound guided interscalene block (Exparel)  The interscalene block was provided by the anesthesia staff per my request for postoperative pain control and to decrease the use of postoperative narcotic medication for pain control  COMPLICATIONS: None    FINDINGS: Glenohumeral Osteoarthritis    SPECIMEN(S): None    ESTIMATED BLOOD LOSS: Minimal    INDICATIONS FOR PROCEDURE:  The patient is a 56 y o  female presenting with pain and lack of function secondary to glenohumeral osteoarthritis of the left shoulder  After a thorough discussion of the risks and benefits of operative and nonoperative care the patient elected for left total shoulder arthroplasty  Informed consent was obtained in the office  OPERATIVE TECHNIQUE:  The day of surgery I identified the patient's left shoulder and marked it with my initials  The patient was taken back to the operating room where anesthesia was induced by the anesthesia staff without complication  The patient was placed in the beachchair position with all bony prominences padded   The left shoulder was prepped and draped in routine sterile fashion and after a time-out for safety and confirming 2 grams of IV Cefazolin were given, a standard deltopectoral approach was performed  The dissection was carried down to the subscapularis and a subscapularis peel was preformed  The long head of biceps had severe tenosynovitis and degeneration, so it was released and tagged for later tenodesis to the anterior humerus at the end of the case  The humeral head was exposed and found to have severe degenerative change with an intact rotator cuff  There was a large inferior humeral head osteophyte which was carefully excised to appropriately size the humeral trials  The humerus was prepared keeping with the surgical technique for a Tornier Flex short stem prosthesis  After preparing the humerus the glenoid was exposed and found to have mild B2 wear pattern (11 degrees of retroversion, 67 % posterior humeral head subluxation)  The glenoid was  prepared for a Tornier Perform Plus A15 Augmented glenoid size small  After confirming appropriate size and version the final glenoid component was press fit centrally and peripheral pegs were cemented  The humerus was then finished and a size 4A stem with a 48 x 20 mm size eccentric head was trialed and found to have excellent stability with full range of motion and appropriate soft tissue tension  Final implants were placed and the area was irrigated with pulse lavage  The subscapularis was repaired to the anterior humerus and the long head biceps was tenodesed to the anterior humerus with Orthocord sutures  The deltopectoral interval was loosely closed with 0 Vicryl and the subdermal layer with 2-0 Vicryl with staples for skin  Sterile dressings and a sling with abduction pillow was placed and the patient was awoken   The patient was transported to the recovery room in good condition and will be admitted for post-operative care and physical therapy will be initiated following the standard total shoulder arthroplasty protocol      SIGNATURE: Irais Turner MD  DATE: March 30, 2021  TIME: 11:21 AM

## 2021-03-30 NOTE — PLAN OF CARE
Problem: Potential for Falls  Goal: Patient will remain free of falls  Description: INTERVENTIONS:  - Assess patient frequently for physical needs  -  Identify cognitive and physical deficits and behaviors that affect risk of falls    -  [place] fall precautions as indicated by assessment   - Educate patient/family on patient safety including physical limitations  - Instruct patient to call for assistance with activity based on assessment  - Modify environment to reduce risk of injury  - Consider OT/PT consult to assist with strengthening/mobility  Outcome: Progressing     Problem: PAIN - ADULT  Goal: Verbalizes/displays adequate comfort level or baseline comfort level  Description: Interventions:  - Encourage patient to monitor pain and request assistance  - Assess pain using appropriate pain scale  - Administer analgesics based on type and severity of pain and evaluate response  - Implement non-pharmacological measures as appropriate and evaluate response  - Consider cultural and social influences on pain and pain management  - Notify physician/advanced practitioner if interventions unsuccessful or patient reports new pain  Outcome: Progressing     Problem: INFECTION - ADULT  Goal: Absence or prevention of progression during hospitalization  Description: INTERVENTIONS:  - Assess and monitor for signs and symptoms of infection  - Monitor lab/diagnostic results  - Monitor all insertion sites, i e  indwelling lines, tubes, and drains  - Monitor endotracheal if appropriate and nasal secretions for changes in amount and color  - [place] appropriate cooling/warming therapies per order  - Administer medications as ordered  - Instruct and encourage patient and family to use good hand hygiene technique  - Identify and instruct in appropriate isolation precautions for identified infection/condition  Outcome: Progressing  Goal: Absence of fever/infection during neutropenic period  Description: INTERVENTIONS:  - Monitor WBC    Outcome: Progressing     Problem: SAFETY ADULT  Goal: Patient will remain free of falls  Description: INTERVENTIONS:  - Assess patient frequently for physical needs  -  Identify cognitive and physical deficits and behaviors that affect risk of falls    -  [place] fall precautions as indicated by assessment   - Educate patient/family on patient safety including physical limitations  - Instruct patient to call for assistance with activity based on assessment  - Modify environment to reduce risk of injury  - Consider OT/PT consult to assist with strengthening/mobility  Outcome: Progressing  Goal: Maintain or return to baseline ADL function  Description: INTERVENTIONS:  -  Assess patient's ability to carry out ADLs; assess patient's baseline for ADL function and identify physical deficits which impact ability to perform ADLs (bathing, care of mouth/teeth, toileting, grooming, dressing, etc )  - Assess/evaluate cause of self-care deficits   - Assess range of motion  - Assess patient's mobility; develop plan if impaired  - Assess patient's need for assistive devices and provide as appropriate  - Encourage maximum independence but intervene and supervise when necessary  - Involve family in performance of ADLs  - Assess for home care needs following discharge   - Consider OT consult to assist with ADL evaluation and planning for discharge  - Provide patient education as appropriate  Outcome: Progressing  Goal: Maintain or return mobility status to optimal level  Description: INTERVENTIONS:  - Assess patient's baseline mobility status (ambulation, transfers, stairs, etc )    - Identify cognitive and physical deficits and behaviors that affect mobility  - Identify mobility aids required to assist with transfers and/or ambulation (gait belt, sit-to-stand, lift, walker, cane, etc )  - [place] fall precautions as indicated by assessment  - Record patient progress and toleration of activity level on Mobility SBAR; progress patient to next Phase/Stage  - Instruct patient to call for assistance with activity based on assessment  - Consider rehabilitation consult to assist with strengthening/weightbearing, etc   Outcome: Progressing     Problem: DISCHARGE PLANNING  Goal: Discharge to home or other facility with appropriate resources  Description: INTERVENTIONS:  - Identify barriers to discharge w/patient and caregiver  - Arrange for needed discharge resources and transportation as appropriate  - Identify discharge learning needs (meds, wound care, etc )  - Arrange for interpretive services to assist at discharge as needed  - Refer to Case Management Department for coordinating discharge planning if the patient needs post-hospital services based on physician/advanced practitioner order or complex needs related to functional status, cognitive ability, or social support system  Outcome: Progressing     Problem: Knowledge Deficit  Goal: Patient/family/caregiver demonstrates understanding of disease process, treatment plan, medications, and discharge instructions  Description: Complete learning assessment and assess knowledge base    Interventions:  - Provide teaching at level of understanding  - Provide teaching via preferred learning methods  Outcome: Progressing

## 2021-03-30 NOTE — ANESTHESIA POSTPROCEDURE EVALUATION
Post-Op Assessment Note    CV Status:  Stable  Pain Score: 0    Pain management: adequate     Mental Status:  Alert and awake   Hydration Status:  Euvolemic   PONV Controlled:  Controlled   Airway Patency:  Patent      Post Op Vitals Reviewed: Yes      Staff: CRNA         No complications documented      BP   123/50   Temp   98 7   Pulse 84   Resp   16   SpO2   98

## 2021-03-30 NOTE — DISCHARGE INSTRUCTIONS
What to Expect Before and After Shoulder Replacement Surgery  You are being scheduled for a shoulder replacement by Dr Ap Centeno to treat your shoulder condition  Here is some information which may help to answer questions that you may have  Please do not hesitate to reach out to our team to answer questions not addressed here  Before Surgery  You will be contacted the evening prior to your surgery to confirm the scheduled time of the procedure and when to arrive at the hospital    Do not eat or drink anything after midnight the night before your surgery so that the anesthesia can be performed safely  If you have been fitted for a sling in the office prior to the surgery please remember to bring it to the hospital   You will meet the anesthesiologist the morning of the surgery  The surgery is performed under a general anesthetic but they will also offer you a regional block (shot to numb the arm) to help control your post-operative pain as well as with a catheter that is left in place after the block  The catheter is connected to a small pump which will continue to provide numbing medicine and help prolong the pain control from the block  Unfortunately this catheter is not as effective as the initial block, but can still be very helpful in managing the pain  After Surgery and in the Hospital  The shoulder replacement surgery typically takes 60-90 minutes  When surgery is completed, your surgeon will update your family and friends on your condition and progress  You will remain in the recovery room for at least an hour or until the anesthesia has worn off and your blood pressure and pulse are stable  If you have pain, the nurses will give you medication  Once out of surgery, your surgeon will decide on how long you will be using a sling in order to protect and position your shoulder  However, this won't keep you from starting physical therapy    Exercises typically begin on the day after surgery with emphasis on moving the shoulder, wrist, and hand  The physical therapist will be provided with a detailed protocol but typically the first 6 weeks are used to regain range of motion and then strengthening is initiated  Starting strengthening exercises too early may lead to complications  When You Are Discharged from the [address] [place] [address] can expect to be released from the hospital the day after surgery (this may change if you have special needs or medical conditions)  Before you are released, the treatment team (Orthopaedic surgery residents, physician assistants and physical therapists) will talk with you about the importance of limiting any sudden or stressful movements to the arm for several weeks or longer  Activities that involve pushing, pulling, and lifting should not be done until you are given permission from your surgeon  Your First Day at [address] [place] [address] may need help with your daily activities, so it is a good idea to have family and friends prepared to help you  It is okay to remove the sling and let the arm hang at the side so that you can get cleaned and change your clothes  To put on a shirt, place the bad arm in first and then the good arm  Reverse to take it off  Don't forget to wear the sling every night for at least the first month after surgery, and never use your arm to push yourself up in bed or from a chair  The added weight on your shoulder may cause you to re-injure the joint  How to [place] in the First Week  You are encouraged to return to your normal eating and sleeping patterns as soon as possible  It is important for you to be active in order to control your weight and muscle tone  It is ok to increase your activity level and even perform light aerobic exercise (like walking or riding a stationary bike) within the first week or so if you are feeling up to it    If there is concern about these activates best to wait until the first post-operative visit and discuss this with the team    Sasha Hernández might be able to return to work within several days if you can perform your job while wearing a sling  Consult with your doctor, as this differs from patient to patient  However, if your job requires heavy lifting or climbing, there may be a delay for several months  Until you are seen for your first follow-up visit, please try and keep wound dry  It is okay to shower but try your best to keep the incision out of direct contact with the water (or consider using a waterproof bandage)  If it does gets wet please dry as best as possible afterwards  If you notice any drainage or a foul odor from your incision or your temperature goes above 101 5 degrees, please contact the office  What You Can Expect in the First Month  Your first post-operative appointment will be with Dr Tao Silva physician assistant (PA) around 2 weeks after the surgery  You skin staples will be removed and X-rays will be obtained at that visit  Please understand that it is quite common to still experience pain at that time but the pain should be steadily improving  Hopefully physical therapy has already begun but if not it will be initiated at this visit and will continue for the 8-12 weeks  At about 12 weeks after surgery you will start a progressive strengthening program  Physical therapy is a deliberate process of not only strengthening your shoulder but also altering how you use your arm  It may be many months before your desired results are achieved, so do not get discouraged  Your shoulder will generally continue to improve steadily up to 6-8 months after surgery  After that point further improvement is very slow; although it has been shown that even after a year or more, activity can increase as muscle strength continues to improve  After the First Month at Home   Because each person heals differently, there are different recovery timelines  An average recovery period typically lasts about between 3-6 months  Talk with your surgeon about which activities will be appropriate for you once you have recovered

## 2021-03-30 NOTE — ANESTHESIA PREPROCEDURE EVALUATION
Procedure:  ARTHROPLASTY SHOULDER - anatomic (Left Shoulder)    Relevant Problems   CARDIO   (+) DVT, lower extremity (HCC)   (+) HLD (hyperlipidemia)   (+) Hypertension      ENDO   (+) Adrenal insufficiency (Andrés's disease) (HCC)      HEMATOLOGY   (+) Anemia      MUSCULOSKELETAL   (+) Chronic back pain   (+) Fibromyalgia   (+) Low back pain   (+) Osteoarthritis of lumbosacral spine without myelopathy   (+) Primary osteoarthritis of left shoulder      NEURO/PSYCH   (+) Anxiety   (+) Chronic back pain   (+) Fibromyalgia   (+) History of TIAs   (+) History of anxiety   (+) History of pulmonary embolism        Physical Exam    Airway    Mallampati score: II  TM Distance: >3 FB  Neck ROM: full     Dental       Cardiovascular      Pulmonary      Other Findings        Anesthesia Plan  ASA Score- 2     Anesthesia Type- general with ASA Monitors  Additional Monitors:   Airway Plan: ETT  Comment: IS Block for post op pain per surgeon request          Plan Factors-    Chart reviewed  Induction- intravenous  Postoperative Plan-     Informed Consent- Anesthetic plan and risks discussed with patient  I personally reviewed this patient with the CRNA  Discussed and agreed on the Anesthesia Plan with the CRNA  Gita Mtz

## 2021-03-30 NOTE — ANESTHESIA PROCEDURE NOTES
Peripheral Block    Patient location during procedure: holding area  Start time: 3/30/2021 9:40 AM  Reason for block: at surgeon's request and post-op pain management  Staffing  Anesthesiologist: Aiden Ferguson MD  Performed: anesthesiologist   Preanesthetic Checklist  Completed: patient identified, site marked, surgical consent, pre-op evaluation, timeout performed, IV checked, risks and benefits discussed and monitors and equipment checked  Peripheral Block  Patient position: supine  Prep: ChloraPrep  Patient monitoring: continuous pulse ox and frequent blood pressure checks  Block type: interscalene  Laterality: left  Injection technique: single-shot  Procedures: ultrasound guided, Ultrasound guidance required for the procedure to increase accuracy and safety of medication placement and decrease risk of complications    Ultrasound permanent image saved  Needle  Needle type: Stimuplex   Needle gauge: 22 G  Needle length: 5 cm  Needle localization: ultrasound guidance  Test dose: negative  Assessment  Injection assessment: incremental injection and local visualized surrounding nerve on ultrasound  Paresthesia pain: none  Heart rate change: no  Slow fractionated injection: yes  Post-procedure:  site cleaned  patient tolerated the procedure well with no immediate complications

## 2021-03-30 NOTE — CONSULTS
Internal Medicine  Consultation Note    Patient: Idalia Ha  Age/sex: 56 y o  female  Medical Record #: [id number]    Assessment/Plan    Status Post left Total Shoulder Arthroplasty   Continue post op pain control measures as prescribed   Follow bowel regimen to help decrease narcotic induced constipation    Follow post operative hemoglobin with serial CBC and treat accordingly   Monitor WBC and fever curve post op while encouraging use of incentive spirometer   DVT prophylaxis in place and reviewed  H/o DVT/PE  · Takes eliquis 5mg bid  · Resume 3/31    Bipolar/depression/anxiety  · Takes multiple medications including high dose ativan 2mg 4x daily  · Continue home regimen as ordered without interruption    Chronic migraines  · [facility]  · Cont current regimen    Hyperlipidemia   Low cholesterol diet   Continue statin therapy    Union's disease  · No documentation regarding chronic steroids  · Verified patient does not take chronic steroids    Hypotension  · Has taken midodrine in the past  · Does not currently use    Hypokalemia  · Continue supplementation    H/o gastric sleeve  · Cont PPI        PRE-OP HGB LEVEL: 13 1    Subjective/ HPI: Idalia Ha was seen and examined  Hx of shoulder pain failed out patient conservative measures  Elected to undergo total shoulder arthroplasty We are asked to see patient for post op management of underlying medical co-morbidities as outlined above  Pt did well intra and post operatively with good hemodynamics  Pt currently comfortable and without any reported post op nausea  ROS:   A 10 point ROS was performed; negative except as noted above       Social History:    Substance Use History:   Social History     Substance and Sexual Activity   Alcohol Use Yes    Frequency: Monthly or less    Drinks per session: 1 or 2    Binge frequency: Less than monthly    Comment: occasionally     Social History Tobacco Use   Smoking Status Former Smoker    Packs/day: 0 20    Types: Cigarettes    Quit date:     Years since quittin 2   Smokeless Tobacco Never Used     Social History     Substance and Sexual Activity   Drug Use Never       Family History:    non-contributory      Review of Scheduled Meds:  Current Facility-Administered Medications   Medication Dose Route Frequency Provider Last Rate    acetaminophen  500 mg Oral Q6H PRN Tequila Hemphill, PA-C      acetaminophen  650 mg Oral Q6H PRN Tequila Hemphill, PA-C      [START ON 3/31/2021] apixaban  5 mg Oral BID Liborious [place], PA-C      ARIPiprazole  5 mg Oral Daily Ruffus [place], PA-C      calcium carbonate  1,000 mg Oral Daily PRN Tequila Hemphill, PA-C      cefazolin  2,000 mg Intravenous Q8H Tequila Hemphill, PA-C      cholecalciferol  5,000 Units Oral Daily Liborious [place], PA-C      docusate sodium  100 mg Oral BID Liborious Joby, PA-C      Erenumab-aooe  140 mg Subcutaneous Q30 Days Tequila Hemphill, PA-C      [START ON 3/31/2021] ferrous sulfate  325 mg Oral Daily With Breakfast Tequila Hemphill, PA-C      fluvoxaMINE  300 mg Oral HS Tequila Hemphill, PA-C      gabapentin  300 mg Oral TID Tequila Hemphill, PA-C      lactated ringers  1,000 mL Intravenous Once PRN Tequila Hemphill PA-C      And    lactated ringers  1,000 mL Intravenous Once PRN Tequila Hemphill, PA-C      lactated ringers  125 mL/hr Intravenous Continuous Tequila Hemphill, PA-C 125 mL/hr (21 1442)    lactated ringers  100 mL/hr Intravenous Continuous Tequila Hemphill, PA-C      [START ON 3/31/2021] lamoTRIgine  200 mg Oral Daily Liborious Joby, PA-C      LORazepam  2 mg Oral 4x Daily Liborious [place], PA-C      meclizine  12 5 mg Oral Q8H PRN Tequila Hemphill, PA-C      morphine injection  2 mg Intravenous Q2H PRN Tequila Hemphill, PA-C      ondansetron  4 mg Intravenous Q6H PRN Liborious [place], PA-C      oxyCODONE  10 mg Oral Q4H PRN Tequila Hemphill, PA-C      oxyCODONE  5 mg Oral Q4H PRN Radene Minor, PA-C      potassium chloride  40 mEq Oral Daily Radene Minor, PA-C      sodium chloride  1,000 mL Intravenous Once PRN Radene Minor, PA-C      And    sodium chloride  1,000 mL Intravenous Once PRN Radene Minor, PA-C         Labs: Invalid input(s): LABGLOM, CMP               Results from last 7 days   Lab Units 21  1153   POC GLUCOSE mg/dl 101       Lab Results   Component Value Date    URINECX No Growth <1000 cfu/mL 2016    URINECX No Growth <1000 cfu/mL 2016       Input and Output Summary (last 24 hours): Intake/Output Summary (Last 24 hours) at 3/30/2021 1457  Last data filed at 3/30/2021 1441  Gross per 24 hour   Intake 900 ml   Output --   Net 900 ml       Imaging:     XR shoulder 2+ vw left    (Results Pending)       *Labs /Radiology studiesLabs reviewed  *Medications reviewed and reconciled as needed  *Please refer to order section for additional ordered labs studies  *Case discussed with primary attending during morning huddle case rounds    Vitals:   Temp (24hrs), Av 8 °F (37 1 °C), Min:97 7 °F (36 5 °C), Max:99 7 °F (37 6 °C)    Temp:  [97 7 °F (36 5 °C)-99 7 °F (37 6 °C)] 98 6 °F (37 °C)  HR:  [] 92  Resp:  [11-16] 13  BP: (102-144)/(44-82) 144/69  SpO2:  [96 %-98 %] 96 %  Body mass index is 34 06 kg/m²       Physical Exam:   General Appearance: no distress, conversive  HEENT: PERRLA, conjuctiva normal; oropharynx clear; mucous membranes moist;   Neck:  Supple, no lymphadenopathy or thyromegaly  Lungs: CTA, normal respiratory effort, no retractions, expiratory effort normal  CV: regular rate and rhythm , PMI normal   ABD: soft non tender, no masses , no hepatic or splenomegaly  EXT: DP pulses intact, no lymphadenopathy, no edema ;  left shoulder dressing in place  Skin: normal turgor, normal texture, no rash  Psych: affect normal, mood normal  Neuro: AAOx3          Invasive Devices     Peripheral Intravenous Line Peripheral IV 03/30/21 Right Forearm less than 1 day    Peripheral IV 03/30/21 Right Hand less than 1 day                   Code Status: Level 1 - Full Code  Current Length of Stay: 0 day(s)    Total floor / unit time spent today 45 minutes with more than 50% spent counseling/coordinating care  Counseling includes discussion with patient re: progress  and discussion with patient of his/her current medical state/information  Coordination of patient's care was performed in conjunction with primary service  Time invested included review of patient's labs, vitals, and management of their comorbidities with continued monitoring  In addition, this patient was discussed with medical team including physician and advanced extenders  The care of the patient was extensively discussed and appropriate treatment plan was formulated unique for this patient  ** Please Note: Fluency Direct voice to text software may have been used in the creation of this document   Audio transcription errors may occur**

## 2021-03-31 ENCOUNTER — APPOINTMENT (OUTPATIENT)
Dept: SURGERY | Facility: HOSPITAL | Age: 61
DRG: 483 | End: 2021-03-31
Payer: COMMERCIAL

## 2021-03-31 ENCOUNTER — ANESTHESIA EVENT (INPATIENT)
Dept: ANESTHESIOLOGY | Facility: HOSPITAL | Age: 61
DRG: 483 | End: 2021-03-31
Payer: COMMERCIAL

## 2021-03-31 ENCOUNTER — ANESTHESIA (INPATIENT)
Dept: ANESTHESIOLOGY | Facility: HOSPITAL | Age: 61
DRG: 483 | End: 2021-03-31
Payer: COMMERCIAL

## 2021-03-31 PROBLEM — Z96.612 STATUS POST TOTAL SHOULDER ARTHROPLASTY, LEFT: Status: ACTIVE | Noted: 2021-03-31

## 2021-03-31 LAB
ANION GAP SERPL CALCULATED.3IONS-SCNC: 6 MMOL/L (ref 4–13)
BUN SERPL-MCNC: 16 MG/DL (ref 5–25)
CALCIUM SERPL-MCNC: 9.2 MG/DL (ref 8.3–10.1)
CHLORIDE SERPL-SCNC: 112 MMOL/L (ref 100–108)
CO2 SERPL-SCNC: 21 MMOL/L (ref 21–32)
CREAT SERPL-MCNC: 1.1 MG/DL (ref 0.6–1.3)
ERYTHROCYTE [DISTWIDTH] IN BLOOD BY AUTOMATED COUNT: 16.1 % (ref 11.6–15.1)
GFR SERPL CREATININE-BSD FRML MDRD: 54 ML/MIN/1.73SQ M
GLUCOSE SERPL-MCNC: 108 MG/DL (ref 65–140)
HCT VFR BLD AUTO: 40.5 % (ref 34.8–46.1)
HGB BLD-MCNC: 12.5 G/DL (ref 11.5–15.4)
MCH RBC QN AUTO: 29.8 PG (ref 26.8–34.3)
MCHC RBC AUTO-ENTMCNC: 30.9 G/DL (ref 31.4–37.4)
MCV RBC AUTO: 97 FL (ref 82–98)
PLATELET # BLD AUTO: 271 THOUSANDS/UL (ref 149–390)
PMV BLD AUTO: 11.3 FL (ref 8.9–12.7)
POTASSIUM SERPL-SCNC: 4.7 MMOL/L (ref 3.5–5.3)
RBC # BLD AUTO: 4.19 MILLION/UL (ref 3.81–5.12)
SODIUM SERPL-SCNC: 139 MMOL/L (ref 136–145)
WBC # BLD AUTO: 15.42 THOUSAND/UL (ref 4.31–10.16)

## 2021-03-31 PROCEDURE — C9290 INJ, BUPIVACAINE LIPOSOME: HCPCS | Performed by: ANESTHESIOLOGY

## 2021-03-31 PROCEDURE — 99254 IP/OBS CNSLTJ NEW/EST MOD 60: CPT | Performed by: PHYSICAL MEDICINE & REHABILITATION

## 2021-03-31 PROCEDURE — 85027 COMPLETE CBC AUTOMATED: CPT | Performed by: PHYSICIAN ASSISTANT

## 2021-03-31 PROCEDURE — 99024 POSTOP FOLLOW-UP VISIT: CPT | Performed by: PHYSICIAN ASSISTANT

## 2021-03-31 PROCEDURE — 97163 PT EVAL HIGH COMPLEX 45 MIN: CPT

## 2021-03-31 PROCEDURE — 99232 SBSQ HOSP IP/OBS MODERATE 35: CPT | Performed by: ANESTHESIOLOGY

## 2021-03-31 PROCEDURE — 97167 OT EVAL HIGH COMPLEX 60 MIN: CPT

## 2021-03-31 PROCEDURE — NC001 PR NO CHARGE: Performed by: ANESTHESIOLOGY

## 2021-03-31 PROCEDURE — 80048 BASIC METABOLIC PNL TOTAL CA: CPT | Performed by: PHYSICIAN ASSISTANT

## 2021-03-31 RX ORDER — BUPIVACAINE HYDROCHLORIDE 5 MG/ML
INJECTION, SOLUTION PERINEURAL
Status: COMPLETED | OUTPATIENT
Start: 2021-03-31 | End: 2021-03-31

## 2021-03-31 RX ADMIN — FLUVOXAMINE MALEATE 300 MG: 50 TABLET ORAL at 22:12

## 2021-03-31 RX ADMIN — GABAPENTIN 300 MG: 300 CAPSULE ORAL at 22:11

## 2021-03-31 RX ADMIN — CEFAZOLIN SODIUM 2000 MG: 2 SOLUTION INTRAVENOUS at 01:38

## 2021-03-31 RX ADMIN — APIXABAN 5 MG: 5 TABLET, FILM COATED ORAL at 17:23

## 2021-03-31 RX ADMIN — ARIPIPRAZOLE 5 MG: 5 TABLET ORAL at 08:40

## 2021-03-31 RX ADMIN — FERROUS SULFATE TAB 325 MG (65 MG ELEMENTAL FE) 325 MG: 325 (65 FE) TAB at 08:39

## 2021-03-31 RX ADMIN — BUPIVACAINE 20 ML: 13.3 INJECTION, SUSPENSION, LIPOSOMAL INFILTRATION at 13:30

## 2021-03-31 RX ADMIN — POTASSIUM CHLORIDE 40 MEQ: 1500 TABLET, EXTENDED RELEASE ORAL at 08:39

## 2021-03-31 RX ADMIN — OXYCODONE HYDROCHLORIDE 10 MG: 10 TABLET ORAL at 10:28

## 2021-03-31 RX ADMIN — MORPHINE SULFATE 2 MG: 10 INJECTION INTRAVENOUS at 03:03

## 2021-03-31 RX ADMIN — LORAZEPAM 2 MG: 1 TABLET ORAL at 08:40

## 2021-03-31 RX ADMIN — LORAZEPAM 2 MG: 1 TABLET ORAL at 17:23

## 2021-03-31 RX ADMIN — OXYCODONE HYDROCHLORIDE 10 MG: 10 TABLET ORAL at 22:11

## 2021-03-31 RX ADMIN — LAMOTRIGINE 200 MG: 100 TABLET ORAL at 08:38

## 2021-03-31 RX ADMIN — OXYCODONE HYDROCHLORIDE 10 MG: 10 TABLET ORAL at 05:40

## 2021-03-31 RX ADMIN — LORAZEPAM 2 MG: 1 TABLET ORAL at 11:14

## 2021-03-31 RX ADMIN — LORAZEPAM 2 MG: 1 TABLET ORAL at 22:11

## 2021-03-31 RX ADMIN — GABAPENTIN 300 MG: 300 CAPSULE ORAL at 17:20

## 2021-03-31 RX ADMIN — GABAPENTIN 300 MG: 300 CAPSULE ORAL at 08:39

## 2021-03-31 RX ADMIN — Medication 5000 UNITS: at 08:38

## 2021-03-31 RX ADMIN — DOCUSATE SODIUM 100 MG: 100 CAPSULE, LIQUID FILLED ORAL at 17:20

## 2021-03-31 RX ADMIN — DOCUSATE SODIUM 100 MG: 100 CAPSULE, LIQUID FILLED ORAL at 08:39

## 2021-03-31 RX ADMIN — ACETAMINOPHEN 650 MG: 325 TABLET, FILM COATED ORAL at 08:39

## 2021-03-31 RX ADMIN — APIXABAN 5 MG: 5 TABLET, FILM COATED ORAL at 08:39

## 2021-03-31 RX ADMIN — BUPIVACAINE HYDROCHLORIDE 5 ML: 5 INJECTION, SOLUTION PERINEURAL at 13:30

## 2021-03-31 RX ADMIN — OXYCODONE HYDROCHLORIDE 10 MG: 10 TABLET ORAL at 01:37

## 2021-03-31 NOTE — PROGRESS NOTES
Internal Medicine Progress Note  Patient: Misty Hester  Age/sex: 56 y o  female  Medical Record #: [id number]      ASSESSMENT/PLAN: (Interval History)  Misty Hester is seen and examined and management for following issues:    Status Post left Total SHOULDER ARTHROPLASTY   Pain controlled   Continue encourage incentive spirometry; monitor fever curve   DVT prophylaxis in place and reviewed  Results from last 7 days   Lab Units 03/31/21  0630   WBC Thousand/uL 15 42*   HEMOGLOBIN g/dL 12 5   HEMATOCRIT % 40 5   PLATELETS Thousands/uL 271          Operative acute blood loss anemia  · Decrease in hgb levels from preop labs results; suspect due to post operation extravasation losses along with potential dilutional effects of fluids  · Monitor follow up CBC post intervention to trend effect    H/o DVT/PE  · Takes eliquis 5mg bid  · Resumed 3/31     Bipolar/depression/anxiety  · Takes multiple medications including high dose ativan 2mg 4x daily  · Continue home regimen as ordered without interruption     Chronic migraines  · [facility]  · Cont current regimen     Hyperlipidemia  · Low cholesterol diet  · Continue statin therapy     Parke's disease  · No documentation regarding chronic steroids  · Verified patient does not take chronic steroids     Hypotension  · Has taken midodrine in the past  · Does not currently use     Hypokalemia  · Continue supplementation    Chronic dizziness/frequent falls  · Episode this a m  with therapy  · Is looking at having her daughter stay with her post operatively     H/o gastric sleeve  · Cont PPI           PRE-OP HGB LEVEL: 13 1  The above assessment and plan was reviewed and updated as determined by my evaluation of the patient on 3/31/2021      Labs:   Results from last 7 days   Lab Units 03/31/21  0630   WBC Thousand/uL 15 42*   HEMOGLOBIN g/dL 12 5   HEMATOCRIT % 40 5   PLATELETS Thousands/uL 271     Results from last 7 days   Lab Units 03/31/21  0507   SODIUM mmol/L 139   POTASSIUM mmol/L 4 7   CHLORIDE mmol/L 112*   CO2 mmol/L 21   BUN mg/dL 16   CREATININE mg/dL 1 10   CALCIUM mg/dL 9 2             Results from last 7 days   Lab Units 03/30/21  1153   POC GLUCOSE mg/dl 101       Review of Scheduled Meds:  Current Facility-Administered Medications   Medication Dose Route Frequency Provider Last Rate    acetaminophen  650 mg Oral Q6H PRN Nima Phoenix, PA-C      apixaban  5 mg Oral BID Nima Phoenix, PA-C      ARIPiprazole  5 mg Oral Daily Nima Phoenix, PA-C      calcium carbonate  1,000 mg Oral Daily PRN Nima Phoenix, PA-C      cholecalciferol  5,000 Units Oral Daily Nima Phoenix, PA-C      docusate sodium  100 mg Oral BID Nima Phoenix, PA-C      ferrous sulfate  325 mg Oral Daily With Breakfast Nima Phoenix, PA-C      fluvoxaMINE  300 mg Oral HS Nima Phoenix, PA-C      gabapentin  300 mg Oral TID Nima Phoenix, PA-C      lactated ringers  1,000 mL Intravenous Once PRN Nima Phoenix, PA-C      And    lactated ringers  1,000 mL Intravenous Once PRN Nima Phoenix, PA-C      lactated ringers  125 mL/hr Intravenous Continuous Nima Phoenix, PA-C Stopped (03/31/21 2715)    lactated ringers  100 mL/hr Intravenous Continuous Nima Phoenix, PA-C Stopped (03/30/21 1630)    lamoTRIgine  200 mg Oral Daily Nima Phoenix, PA-C      LORazepam  2 mg Oral 4x Daily Nima Phoenix, PA-C      meclizine  12 5 mg Oral Q8H PRN Nima Phoenix, PA-C      morphine injection  2 mg Intravenous Q2H PRN Nima Phoenix, PA-C      ondansetron  4 mg Intravenous Q6H PRN Nima Phoenix, PA-C      oxyCODONE  10 mg Oral Q4H PRN Nima Phoenix, PA-C      oxyCODONE  5 mg Oral Q4H PRN Nima Phoenix, PA-C      potassium chloride  40 mEq Oral Daily Nima Phoenix, PA-C      sodium chloride  1,000 mL Intravenous Once PRN Nima Phoenix, PA-YO      And    sodium chloride  1,000 mL Intravenous Once PRN Hardie Phoenix, PA-C Subjective/ HPI: Kenney Mckeon seen and examined  Patient's overnight issues or events were reviewed with nursing or staff during rounds or morning huddle session  New or overnight issues include the following:     Pt without any overnight events or reported nursing issues; concerned regarding dc home   works during the day      ROS:   A 10 point ROS was performed; negative except as noted above  Imaging:     XR shoulder 2+ vw left    (Results Pending)       *Labs /Radiology studies Reviewed  *Medications  reviewed and reconciled as needed  *Please refer to order section for additional ordered labs studies  *Case discussed with primary attending during morning huddle case rounds    Physical Examination:  Vitals:   Vitals:    03/30/21 1850 03/31/21 0026 03/31/21 0313 03/31/21 0808   BP: 128/78 132/78 133/78 131/79   Pulse: 99 87 88 94   Resp: 20 18 18 18   Temp: 98 °F (36 7 °C) 98 °F (36 7 °C) 98 1 °F (36 7 °C) 98 4 °F (36 9 °C)   TempSrc:       SpO2: 90% (!) 88% (!) 89% (!) 88%   Weight:       Height:           General Appearance: no distress, conversive  HEENT: PERRLA, conjuctiva normal; oropharynx clear; mucous membranes moist;   Neck:  Supple, no lymphadenopathy or thyromegaly  Lungs: CTA, normal respiratory effort, no retractions, expiratory effort normal  CV: regular rate and rhythm , PMI normal   ABD: soft non tender, no masses , no hepatic or splenomegaly  EXT: DP pulses intact, no lymphadenopathy, no edema; left shoulder dressing in place  Skin: normal turgor, normal texture, no rash  Psych: affect normal, mood normal  Neuro: AAOx3      The above physical exam was reviewed and updated as determined by my evaluation of the patient on 3/31/2021      Invasive Devices     Peripheral Intravenous Line            Peripheral IV 03/30/21 Right Hand less than 1 day                   VTE Pharmacologic Prophylaxis: Sequential pneumatic compression stocking  Code Status: Level 1 - Full Code  Current Length of Stay: 1 day(s)      Total time spent:  30 minutes with more than 50% spent counseling/coordinating care  Counseling includes discussion with patient re: progress  and discussion with patient of his/her current medical state/information  Coordination of patient's care was performed in conjunction with primary service  Time invested included review of patient's labs, vitals, and management of their comorbidities with continued monitoring  In addition, this patient was discussed with medical team including physician and advanced extenders  The care of the patient was extensively discussed and appropriate treatment plan was formulated unique for this patient  ** Please Note:  voice to text software may have been used in the creation of this document   Although proof errors in transcription or interpretation are a potential of such software**

## 2021-03-31 NOTE — PROGRESS NOTES
Peripheral Nerve Block Follow-up Note - Acute Pain Service    Misty Hester 56 y o  female MRN: [id number]  Unit/Bed#: -[facility] Encounter: [id number]      Assessment:   Principal Problem:    Status post total shoulder arthroplasty, left  Active Problems:    Primary osteoarthritis of left shoulder    Misty Hester is a 56y o  year old female POD #1 left total shoulder arthroplasty  Plan:   - Left interscalene block with Exparel is functioning appropriately with expected sensory deficits  · Change Tylenol to 975mg every 8 hours scheduled  · Discontinue tylenol as needed  · Gabapentin 300mg TID, home medication   · Discontinue Morphine  · Oxycodone 5mg every 4 hours as needed for moderate pain  · Oxycodone 10mg every 4 hours as needed for severe pain     Bowel Regimen  · Colace 100mg BID  · Add senna daily to prevent opioid induced constipation  Discussed case with anesthesiologist; to consider axilla peripheral block for additional coverage  Patient agreeable for additional block  APS will continue to follow  Please call [email or web address] [phone] or [email or web address] (/ between [id number] and on weekends) with questions or concerns    Pain History  Current pain location(s): left axilla  Pain Scale:  4-10  Quality: sharp, stabbing, throbbing   24 hour history: Patient is resting in bed, reporting severe pain in her left axilla region  Denied pain in the anterior portion of her left shoulder  Able to move left hand/fingers and detect light touch  Denied numbness and tingling  Taking oxycodone as needed with about 10% relief in pain  History of chronic back pain, takes gabapentin at home      Opioid requirement previous 24 hours: Oxycodone 50mg, IV dilaudid 0 5mg, Morphine 4mg    Meds/Allergies   all current active meds have been reviewed, current meds:   Current Facility-Administered Medications   Medication Dose Route Frequency    acetaminophen (TYLENOL) tablet 650 mg  650 mg Oral Q6H PRN    apixaban (ELIQUIS) tablet 5 mg  5 mg Oral BID    ARIPiprazole (ABILIFY) tablet 5 mg  5 mg Oral Daily    calcium carbonate (TUMS) chewable tablet 1,000 mg  1,000 mg Oral Daily PRN    cholecalciferol (VITAMIN D3) tablet 5,000 Units  5,000 Units Oral Daily    docusate sodium (COLACE) capsule 100 mg  100 mg Oral BID    ferrous sulfate tablet 325 mg  325 mg Oral Daily With Breakfast    fluvoxaMINE (LUVOX) tablet 300 mg  300 mg Oral HS    gabapentin (NEURONTIN) capsule 300 mg  300 mg Oral TID    lactated ringers bolus 1,000 mL  1,000 mL Intravenous Once PRN    And    lactated ringers bolus 1,000 mL  1,000 mL Intravenous Once PRN    lactated ringers infusion  125 mL/hr Intravenous Continuous    lactated ringers infusion  100 mL/hr Intravenous Continuous    lamoTRIgine (LaMICtal) tablet 200 mg  200 mg Oral Daily    LORazepam (ATIVAN) tablet 2 mg  2 mg Oral 4x Daily    meclizine (ANTIVERT) tablet 12 5 mg  12 5 mg Oral Q8H PRN    morphine (PF) 10 mg/mL injection 2 mg  2 mg Intravenous Q2H PRN    ondansetron (ZOFRAN) injection 4 mg  4 mg Intravenous Q6H PRN    oxyCODONE (ROXICODONE) IR tablet 10 mg  10 mg Oral Q4H PRN    oxyCODONE (ROXICODONE) IR tablet 5 mg  5 mg Oral Q4H PRN    potassium chloride (K-DUR,KLOR-CON) CR tablet 40 mEq  40 mEq Oral Daily    sodium chloride 0 9 % bolus 1,000 mL  1,000 mL Intravenous Once PRN    And    sodium chloride 0 9 % bolus 1,000 mL  1,000 mL Intravenous Once PRN    and PTA meds:   Prior to Admission Medications   Prescriptions Last Dose Informant Patient Reported? Taking?    ARIPiprazole (ABILIFY) 15 mg tablet 3/30/2021 at 0500  No Yes   Sig: Take 0 5 tablets (7 5 mg total) by mouth daily   Patient taking differently: Take 5 mg by mouth daily    Erenumab-aooe (AIMOVIG) 140 MG/ML SOAJ More than a month at Unknown time  Yes No   Sig: Inject 140 mg under the skin every 30 (thirty) days    LORazepam (ATIVAN) 1 mg tablet 3/30/2021 at 0500  No Yes   Sig: Take 2 tablets (2 mg total) by mouth 2 (two) times a day for 10 days   Patient taking differently: Take 2 mg by mouth 4 (four) times a day One in morning, one tab afternoon two tablet at bedtime   acetaminophen (TYLENOL) 500 mg tablet 3/23/2021  No Yes   Sig: Take 1 tablet (500 mg total) by mouth every 6 (six) hours as needed (pain)   apixaban (ELIQUIS) 5 mg 3/26/2021  Yes Yes   Sig: Take 5 mg by mouth 2 (two) times a day   cholecalciferol (VITAMIN D3) 1,000 units tablet 3/29/2021 at 0900  Yes Yes   Sig: Take 5,000 Units by mouth daily   ferrous sulfate 325 (65 Fe) mg tablet 3/29/2021 at 0900  Yes Yes   Sig: Take 325 mg by mouth daily with breakfast   fluvoxaMINE (LUVOX) 50 mg tablet 3/29/2021 at 2100  No Yes   Sig: Take 3 tablets (150 mg total) by mouth daily at bedtime   Patient taking differently: Take 300 mg by mouth daily at bedtime    gabapentin (NEURONTIN) 300 mg capsule 3/30/2021 at 0500  No Yes   Sig: Take 1 capsule (300 mg total) by mouth 3 (three) times a day   lamoTRIgine (LaMICtal) 200 MG tablet 3/30/2021 at 0500  Yes Yes   Sig: Take 200 mg by mouth daily  meclizine (ANTIVERT) 12 5 MG tablet 3/26/2021  No Yes   Sig: Take 1 tablet (12 5 mg total) by mouth every 8 (eight) hours as needed for dizziness for up to 7 days   ondansetron (ZOFRAN) 4 mg tablet More than a month at Unknown time  No No   Sig: Take 1 tablet (4 mg total) by mouth every 8 (eight) hours as needed for nausea or vomiting   potassium chloride (K-DUR,KLOR-CON) 20 mEq tablet 3/29/2021 at 0900  Yes Yes   Sig: Take 40 mEq by mouth daily      Facility-Administered Medications: None       Allergies   Allergen Reactions    Ketorolac Itching and Other (See Comments)     [toradol] Itching, hives    Mushroom Extract Complex - Food Allergy Hives       Objective     Temp:  [98 °F (36 7 °C)-99 7 °F (37 6 °C)] 98 4 °F (36 9 °C)  HR:  [] 94  Resp:  [11-20] 18  BP: (113-144)/(55-82) 131/79    Physical Exam  Vitals signs reviewed     Constitutional: General: She is awake  She is not in acute distress  Appearance: She is not ill-appearing, toxic-appearing or diaphoretic  HENT:      Head: Normocephalic and atraumatic  Nose: Nose normal    Pulmonary:      Effort: Pulmonary effort is normal  No tachypnea, bradypnea or respiratory distress  Neurological:      Mental Status: She is alert and oriented to person, place, and time  Mental status is at baseline  Sensory: Sensation is intact  Motor: No tremor  Psychiatric:         Mood and Affect: Mood normal  Mood is not anxious  Speech: Speech normal          Behavior: Behavior normal  Behavior is cooperative  Lab Results:   Results from last 7 days   Lab Units 03/31/21  0630   WBC Thousand/uL 15 42*   HEMOGLOBIN g/dL 12 5   HEMATOCRIT % 40 5   PLATELETS Thousands/uL 271      Results from last 7 days   Lab Units 03/31/21  0507   POTASSIUM mmol/L 4 7   CHLORIDE mmol/L 112*   CO2 mmol/L 21   BUN mg/dL 16   CREATININE mg/dL 1 10   CALCIUM mg/dL 9 2       Imaging Studies: I have personally reviewed pertinent reports  Counseling / Coordination of Care  Total floor / unit time spent today 20 minutes  Greater than 50% of total time was spent with the patient and / or family counseling and / or coordination of care  A description of the counseling / coordination of care: Reviewed plan of care and medications including possibility of additional peripheral block to cover axilla region with patient, RN staff, anesthesiology and primary care service  Please note that the APS provides consultative services regarding pain management only  With the exception of ketamine, peripheral nerve catheters, and epidural infusions (and except when indicated), final decisions regarding starting or changing doses of analgesic medications are at the discretion of the consulting service  Off hours consultation and/or medication management is generally not available      MOON Hernandez  Acute Pain Service

## 2021-03-31 NOTE — UTILIZATION REVIEW
Initial Clinical Review    Elective INPT surgical procedure  Age/Sex: 56 y o  female  Surgery Date: 3/30/2021  Procedure: Left Total Shoulder Arthroplasty with Long Head Biceps Tenodesis  Anesthesia: General with endotracheal tube with ultrasound guided interscalene block (Exparel)  Operative Findings: Glenohumeral Osteoarthritis  POD#1 Progress Note:   Assessment: 56 y  o female post operative day 1 left total shoulder arthroplasty  Doing well     Plan:  · Nonweight Bearing left upper extremity  · Up and out of bed  · Sling for Comfort, may remove for pendulums and hygiene  · DVT prophylaxis  · Ice and analgesics  · Will continue to assess for acute blood loss anemia  ·   Admission Orders: Date/Time/Statement:   Admission Orders (From admission, onward)     Ordered        03/30/21 1138  Inpatient Admission  Once                   Orders Placed This Encounter   Procedures    Inpatient Admission     Standing Status:   Standing     Number of Occurrences:   1     Order Specific Question:   Level of Care     Answer:   Med Surg [16]     Order Specific Question:   Estimated length of stay     Answer:   More than 2 Midnights     Order Specific Question:   Certification     Answer:   I certify that inpatient services are medically necessary for this patient for a duration of greater than two midnights  See H&P and MD Progress Notes for additional information about the patient's course of treatment       Vital Signs: /79   Pulse 94   Temp 98 4 °F (36 9 °C)   Resp 18   Ht 5' 8" (1 727 m)   Wt 102 kg (224 lb)   SpO2 (!) 88%   BMI 34 06 kg/m²   Diet: REG DIET  Mobility: OOB  DVT Prophylaxis: SCD  Medications/Pain Control:   Scheduled Medications:  apixaban, 5 mg, Oral, BID  ARIPiprazole, 5 mg, Oral, Daily  cholecalciferol, 5,000 Units, Oral, Daily  docusate sodium, 100 mg, Oral, BID  ferrous sulfate, 325 mg, Oral, Daily With Breakfast  fluvoxaMINE, 300 mg, Oral, HS  gabapentin, 300 mg, Oral, TID  lamoTRIgine, 200 mg, Oral, Daily  LORazepam, 2 mg, Oral, 4x Daily  potassium chloride, 40 mEq, Oral, Daily      Continuous IV Infusions:  lactated ringers, 125 mL/hr, Intravenous, Continuous  lactated ringers, 100 mL/hr, Intravenous, Continuous      PRN Meds:  acetaminophen, 650 mg, Oral, Q6H PRN 3/31 X 1  calcium carbonate, 1,000 mg, Oral, Daily PRN  lactated ringers, 1,000 mL, Intravenous, Once PRN    And  lactated ringers, 1,000 mL, Intravenous, Once PRN  meclizine, 12 5 mg, Oral, Q8H PRN  morphine injection, 2 mg, Intravenous, Q2H PRN 3/30 X 1, 3/31 X 1  ondansetron, 4 mg, Intravenous, Q6H PRN 3/30 X 2, 3/31 X 2  oxyCODONE, 10 mg, Oral, Q4H PRN  oxyCODONE, 5 mg, Oral, Q4H PRN  sodium chloride, 1,000 mL, Intravenous, Once PRN    And  sodium chloride, 1,000 mL, Intravenous, Once PRN  IV DILAUDID 3/30 X 4    PT/OT  IS      Network Utilization Review Department  ATTENTION: Please call with any questions or concerns to [phone] and carefully listen to the prompts so that you are directed to the right person  All voicemails are confidential   Sudie Crigler all requests for admission clinical reviews, approved or denied determinations and any other requests to dedicated fax number below belonging to the campus where the patient is receiving treatment   List of dedicated fax numbers for the Facilities:  [address] DENIALS (Administrative/Medical Necessity) [phone]   [address] (Maternity/NICU/Pediatrics) [phone]   [address] [phone]   [address] [address] [facility] [address] Jayce Kincaid 6473 (  Viola Moreno "Madeline" 103) [address] [facility] [address] [place] [address] [place] [address] [phone]

## 2021-03-31 NOTE — CONSULTS
PHYSICAL MEDICINE AND REHABILITATION CONSULT NOTE  Nena Garcia 56 y o  female MRN: [id number]  Unit/Bed#: -01 Encounter: [id number]    Requested by (Physician/Service): Irais Turner MD  Reason for Consultation:  Assessment of rehabilitation needs  Chief Complaint:  Now her L shoulder feeling better, numb  Difficulty performing ADLs  Assessment:  Rehabilitation Diagnosis:    Other Ortho: s/p Total Shoulder Arthroplasty    Impaired mobility and self care    Recommendations:  Rehabilitation Plan:   Continue PT/OT  while on acute care   At this time would recommend acute rehab for this patient - she has had a fall while here, and has fallen 3x in the past month  Now off balance due to her TSA and restrictions  She has episodes of dizziness/lightheadedness/sudden buckling of unclear etiology that would benefit from [facility] medical oversight, 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 rehabilitation units require testing within 48 hours of potential admission for all general admissions  Please re-test if needed  *Re-testing is NOT required for patients recovering from COVID-19 infection if isolation has been discontinued per CDC criteria  · Bundle:  No        Medical Co-morbidities Pending Issues:  #Pain: Neurontin 300mg Q8hr, Oxycodone 5-10mg Q4hr PRN, Tylenol PRN  #Bowel: Colace BID only - no BMs recorded yet  #Bladder: Voiding, continent  #Skin/Pressure Injury Prevention: Turn Q2hr in bed, with weight shifts [id number] in wheelchair, float heels     #DVT Prophylaxis: Fully anticoagulated on apixaban   #GI Prophylaxis: On a PO diet Thank you for allowing the PM&R service to participate in the care of this patient  We will continue to follow Jesus Morejon progress with you  Please do not hesitate to call with questions or concerns    History of Present Illness:  Misty Hester is a 56 y o  female with PMH of Adrenal insufficiency, Bipolar disorder, Cervical radiculopathy, Chronic back pain/chronic pain, Hx of DVT, HTN, Fibromoyalgia, Migraines who presented to [address] [place] [address] on 3/30/21 for L total shoulder arthroplasty with Dr Stevie Evans for glenohumeral arthritis that failed conservative management  She is NWB in her LUE and in a sling for comfort  Approved for Sonda41 an hygiene  During therapy/stair training, she had a dizzy episode with bilateral knee buckling  She states this happens frequently at home  Today she had an intercostobrachial field block with bupivicaine by APS  She denies any CP, SOB, fevers, chill, N/V, abdominal pain  Review of Systems: 10 point ROS negative except for what is noted in HPI    CURRENT GAP IN FUNCTION     Prior to Admission:     Functional Status: Patient was independent with mobility/ambulation, transfers, ADL's, IADL's  She is retired  FUNCTIONAL STATUS:  Physical Therapy:  Conner with transfers, with buckling episode, modA  Conner ambulation 45' x2  Occupational Therapy: Sup eating/grooming, modA UB bathing/dressing, LB bathing/dressing, modA toileting  Speech Therapy    Social History:    Misty Hester is  and Lives with: lives with their spouse  She [place] [facility] - [place]   The living area: can live on one level  Equipment in home: Commode, Shower Chair, [address], [place] [facility] and [place]  There 3 steps to enter the home  Patient/family's goals: Return to previous home/apartment  The patient will not have 24 hour ARC Supervision/physical assistance: supervision/physical assistance available upon discharge      Social History     Socioeconomic History    Marital status: /Civil Union     Spouse name: None    Number of children: None    Years of education: None    Highest education level: None   Occupational History    None   Social Needs    Financial resource strain: None    Food insecurity     Worry: None     Inability: None    Transportation needs     Medical: None     Non-medical: None   Tobacco Use    Smoking status: Former Smoker     Packs/day: 0 20     Types: Cigarettes     Quit date:      Years since quittin 2    Smokeless tobacco: Never Used   Substance and Sexual Activity    Alcohol use: Yes     Frequency: Monthly or less     Drinks per session: 1 or 2     Binge frequency: Less than monthly     Comment: occasionally    Drug use: Never    Sexual activity: Not Currently   Lifestyle    Physical activity     Days per week: None     Minutes per session: None    Stress: None   Relationships    Social connections     Talks on phone: None     Gets together: None     Attends [employer] service: None     Active member of club or organization: None     Attends meetings of clubs or organizations: None     Relationship status: None    Intimate partner violence     Fear of current or ex partner: None     Emotionally abused: None     Physically abused: None     Forced sexual activity: None   Other Topics Concern    None   Social History Narrative    None        Family History:    Family History   Problem Relation Age of Onset    Breast cancer Mother     Migraines Mother     Arthritis Mother     Kidney disease Father     Heart disease Father     Diabetes Father     Arthritis Father     Brain cancer Brother     Seizures Brother          MEDICATIONS:     Current Facility-Administered Medications:     acetaminophen (TYLENOL) tablet 650 mg, 650 mg, Oral, Q6H PRN, Mindy Signs, PA-C, 650 mg at 21 0839    apixaban (ELIQUIS) tablet 5 mg, 5 mg, Oral, BID, Mindy Signs, PA-C, 5 mg at 21 0839    ARIPiprazole (ABILIFY) tablet 5 mg, 5 mg, Oral, Daily, Liborious Joby, PA-C, 5 mg at 03/31/21 0840    calcium carbonate (TUMS) chewable tablet 1,000 mg, 1,000 mg, Oral, Daily PRN, Tequila Hemphill, PA-C    cholecalciferol (VITAMIN D3) tablet 5,000 Units, 5,000 Units, Oral, Daily, Tequila Hemphill, PA-C, 5,000 Units at 03/31/21 5268    docusate sodium (COLACE) capsule 100 mg, 100 mg, Oral, BID, Liborious Robeson, PA-C, 100 mg at 03/31/21 5778    ferrous sulfate tablet 325 mg, 325 mg, Oral, Daily With Breakfast, Tequila Hemphill, PA-C, 325 mg at 03/31/21 0839    fluvoxaMINE (LUVOX) tablet 300 mg, 300 mg, Oral, HS, Liborious Robeson, PA-C, 300 mg at 03/30/21 2214    gabapentin (NEURONTIN) capsule 300 mg, 300 mg, Oral, TID, Tequila Hemphill, PA-C, 300 mg at 03/31/21 0298    lactated ringers infusion, 125 mL/hr, Intravenous, Continuous, Tequila Hemphill PA-C, Stopped at 03/31/21 6712    lamoTRIgine (LaMICtal) tablet 200 mg, 200 mg, Oral, Daily, Ruffus Robeson, PA-C, 200 mg at 03/31/21 7908    LORazepam (ATIVAN) tablet 2 mg, 2 mg, Oral, 4x Daily, Tequila Hemphill, PA-C, 2 mg at 03/31/21 1114    meclizine (ANTIVERT) tablet 12 5 mg, 12 5 mg, Oral, Q8H PRN, Tequila Hemphill, PA-C    morphine (PF) 10 mg/mL injection 2 mg, 2 mg, Intravenous, Q2H PRN, Tequila Hemphill, PA-C, 2 mg at 03/31/21 0303    ondansetron (ZOFRAN) injection 4 mg, 4 mg, Intravenous, Q6H PRN, Tequila Robeson, PA-C    oxyCODONE (ROXICODONE) IR tablet 10 mg, 10 mg, Oral, Q4H PRN, Tequila Hemphill PA-C, 10 mg at 03/31/21 1028    oxyCODONE (ROXICODONE) IR tablet 5 mg, 5 mg, Oral, Q4H PRN, Tequila Hemphill PA-C    potassium chloride (K-DUR,KLOR-CON) CR tablet 40 mEq, 40 mEq, Oral, Daily, Tequila Hemphill PA-C, 40 mEq at 03/31/21 5447    Past Medical History:     Past Medical History:   Diagnosis Date    Adrenal insufficiency ([place]'s disease) ([facility] [address] )     Bipolar disorder     C  difficile diarrhea     Cervical radiculopathy     Chronic back pain     Chronic pain disorder lumbar    DVT, lower extremity ([facility] [address] ) 1991    Fibromyalgia     History of TIAs     cannot remember details    Hypertension     Hypokalemia     Migraine     MRSA (methicillin resistant Staphylococcus aureus) 07/20/2012    nasal swab negative 4/5/19    Psychiatric disorder     Anxiety, major depression, bipolar    Spinal stenosis     Stroke ([facility] [address] )     tia    Syncope 2014    orthostatic hypotension    Wears dentures     upper        Past Surgical History:     Past Surgical History:   Procedure Laterality Date    APPENDECTOMY      CAST APPLICATION Left 1/3/2075    Procedure: Application short-arm splint;  Surgeon: Anaid Brown MD;  [facility];  Service: Orthopedics    COLONOSCOPY      GASTRIC RESTRICTION SURGERY      Gastric Sleeve Nov 2015    HYSTERECTOMY      DONALD    JOINT REPLACEMENT      Left Knee 2011 and Right Hip 2010    ORIF WRIST FRACTURE Left 7/4/2020    Procedure: Open reduction and internal fixation left radius and ulnar shaft fracture;  Surgeon: Anaid Brown MD;  [facility];  Service: Orthopedics    TX RECONSTR TOTAL SHOULDER IMPLANT Left 3/30/2021    Procedure: ARTHROPLASTY SHOULDER - anatomic;  Surgeon: Daniel Waldron MD;  [facility];  Service: Orthopedics    WISDOM TOOTH EXTRACTION           Allergies: Allergies   Allergen Reactions    Ketorolac Itching and Other (See Comments)     [toradol] Itching, hives    Mushroom Extract Complex - Food Allergy Hives           Physical Exam:  /79   Pulse 94   Temp 98 4 °F (36 9 °C)   Resp 18   Ht 5' 8" (1 727 m)   Wt 102 kg (224 lb)   SpO2 (!) 88%   BMI 34 06 kg/m²        Intake/Output Summary (Last 24 hours) at 3/31/2021 1438  Last data filed at 3/30/2021 1630  Gross per 24 hour   Intake 300 ml   Output --   Net 300 ml       Body mass index is 34 06 kg/m²  Gen: No acute distress, Well-nourished, well-appearing    HEENT: Moist mucus membranes, Normocephalic/Atraumatic  Cardiovascular: Regular  Heme/Extr: No edema  Pulmonary: Non-labored breathing  : No monge  GI: Soft, non-tender, non-distended  MSK: L arm in sling  Strength 5/5 throughout, and intact in median/radial, ulnar distribution distally in LUE  Integumentary: Skin is warm, dry  Neuro: AAOx3, Sensation intact to light touch throughout  Speech is intact  Appropriate to questioning  Tone is normal    Psych: Normal mood and affect  LABORATORY RESULTS:      Lab Results   Component Value Date    HGB 12 5 03/31/2021    HGB 12 3 07/07/2015    HCT 40 5 03/31/2021    HCT 38 1 07/07/2015    WBC 15 42 (H) 03/31/2021    WBC 7 66 07/07/2015     Lab Results   Component Value Date    BUN 16 03/31/2021    BUN 13 07/07/2015     07/07/2015    K 4 7 03/31/2021    K 3 9 07/07/2015     (H) 03/31/2021     07/07/2015    GLUCOSE 86 07/04/2020    GLUCOSE 98 07/07/2015    CREATININE 1 10 03/31/2021    CREATININE 1 00 07/07/2015     Lab Results   Component Value Date    PROTIME 14 8 (H) 03/17/2021    PROTIME 12 4 05/07/2015    INR 1 15 03/17/2021    INR 0 90 05/07/2015        DIAGNOSTIC STUDIES: Reviewed  Ct Shoulder Left Blue Print    Result Date: 2/22/2021  Impression: CT of the left shoulder performed according to [employer] protocol  Slight retroversion estimated at 7 degrees with good glenoid bone stock   Workstation performed: [id number]

## 2021-03-31 NOTE — PROGRESS NOTES
Pastoral Care Progress Note    3/31/2021  Patient: Anatoly Zhu : 1960  Admission Date & Time: 3/30/2021 0751  MRN: [id number] CSN: [id number]         provided introduction to /Pastoral Care available to Pt  Pt identifies as Baptism and would welcome a  visit   placed Pt on Fr   92 [facility] visitation list                21 1100   Clinical Encounter Type   Visited With Patient   Routine Visit Introduction

## 2021-03-31 NOTE — PLAN OF CARE
Problem: OCCUPATIONAL THERAPY ADULT  Goal: Performs self-care activities at highest level of function for planned discharge setting  See evaluation for individualized goals  Description: Treatment Interventions: ADL retraining, Functional transfer training, Endurance training, Patient/family training, Equipment evaluation/education, Compensatory technique education, Energy conservation          See flowsheet documentation for full assessment, interventions and recommendations  Note: Limitation: Decreased ADL status, Decreased endurance, Decreased self-care trans, Decreased high-level ADLs  Prognosis: Good  Assessment: PT R-HAND DOMINANT 61 YO SEEN FOR INITIAL OT EVALUATION S/P L TSA W/ LONG HEAD BICEPS TENODESIS ON 3/30/2021  PT IS NWB LUE IN SHOULDER ABDUCTION SLING PER ORTHO  PT'S PMH INCLUDES L SHOULDER OSTEOARTHRITIS, ANEMIA, HLD, HTN, SAMUEL'S DISEASE, HYPOKALEMIA, FIBROMYALGIA, AND ANXIETY  PT RESIDES W/  IN A 2 SH W/ A 1ST FLOOR SET UP  PT'S  WORKS AS A  AND CAN PROVIDE LIMITED ASSISTANCE  PT WAS INDEPENDENT W/ ADLS/IADLS AT BASELINE  PT CURRENTLY MOD A FOR LB/UB ADLS  PT IS MIN A FOR FUNCTIONAL TRANSFERS AND MIN A FOR FUNCTIONAL MOBILITY  PT HAD A LOSS OF BALANCE DURING FUNCTIONAL MOBILITY REQUIRING MAX A, PT LABELLED IT A SUDDEN DIZZY EPISODE  PT LIMITED DUE TO WBS, PAIN, TSA PRECAUTIONS, FATIGUE, LIMITED INSIGHT INTO DEFICITS/DECREASED SAFETY AWARENESS DUE TO PREVIOUS LEVEL OF INDEPENDENCE, DECREASED BALANCE, DECREASED ACTIVITY TOLERANCE, FALL RISK, DIZZINESS, AND LIMITED SUPPORT  PT EDUCATED ON WBS, TSA PRECAUTIONS, SLING MANAGEMENT, BODY MECHANICS, SAFETY, COMPENSATORY TECHNIQUES, ONE HANDED DRESSING TECHNIQUES, REQUIRING ASSISTANCE FOR ADLS, AND ENERGY CONSERVATION TECHNIQUES  The patient's raw score on the AM-PAC Daily Activity inpatient short form is 14, standardized score is 33 39, less than 39 4   Patients at this level are likely to benefit from discharge to post-acute rehabilitation services  Please refer to the recommendation of the Occupational Therapist for safe discharge planning  FROM OT PERSPECTIVE, D/C REC REHAB  CONT TO FOLLOW-UP 3-5X/WEEK TO ADDRESS THE FOLLOWING GOALS  OT Discharge Recommendation: Post-Acute Rehabilitation Services  OT - OK to Discharge:  Yes

## 2021-03-31 NOTE — PHYSICAL THERAPY NOTE
PHYSICAL THERAPY EVALUATION  NAME:  Kenney Mckeon  DATE: 03/31/21    AGE:   56 y o  Mrn:   [id number]  ADMIT DX:  Primary osteoarthritis of left shoulder [M19 [id number]]    Past Medical History:   Diagnosis Date    Adrenal insufficiency (Blaine's disease) ([facility] )     Bipolar disorder     C  difficile diarrhea     Cervical radiculopathy     Chronic back pain     Chronic pain disorder     lumbar    DVT, lower extremity ([facility] ) 1991    Fibromyalgia     History of TIAs     cannot remember details    Hypertension     Hypokalemia     Migraine     MRSA (methicillin resistant Staphylococcus aureus) 07/20/2012    nasal swab negative 4/5/19    Psychiatric disorder     Anxiety, major depression, bipolar    Spinal stenosis     Stroke ([place] [address] )     tia    Syncope 2014    orthostatic hypotension    Wears dentures     upper       Past Surgical History:   Procedure Laterality Date    APPENDECTOMY      CAST APPLICATION Left 7/9/9577    Procedure: Application short-arm splint;  Surgeon: Lilia Cornell MD;  [facility];  Service: Orthopedics    COLONOSCOPY      GASTRIC RESTRICTION SURGERY      Gastric Sleeve Nov 2015    HYSTERECTOMY      DONALD    JOINT REPLACEMENT      Left Knee 2011 and Right Hip 2010    ORIF WRIST FRACTURE Left 7/4/2020    Procedure: Open reduction and internal fixation left radius and ulnar shaft fracture;  Surgeon: Lilia Cornell MD;  [facility];  Service: Orthopedics    WISDOM TOOTH EXTRACTION         Length Of Stay: 1    PHYSICAL THERAPY EVALUATION:        03/31/21 0755   Note Type   Note type Evaluation   Pain Assessment   Pain Assessment Tool 0-10   Pain Score 4   Pain Location/Orientation Orientation: Left; Location: Shoulder   Pain Onset/Description Onset: Ongoing;Frequency: Constant/Continuous; Descriptor: Aching   Effect of Pain on Daily Activities Increased pain with activity   Patient's Stated Pain Goal No pain   Hospital Pain Intervention(s) Ambulation/increased activity;Repositioned   Home Living   Type of [address] [facility] Two level; Able to live on main level with bedroom/bathroom;Stairs to enter with rails  (3 BECCA )   Home Equipment   (None as per patient)   Additional Comments Patient reports living with spouse who is able to assist as needed however, patient reports spouse is a  and pt is gone for long periods of time during the day/ night  Patient denies any other local family or friends who can assist   Prior Function   Level of [place] Independent with ADLs and functional mobility   Lives With Spouse   Receives Help From Family   ADL Assistance Independent   Falls in the last 6 months 1 to 4  (3 as per pt)   Comments Patient denies use of an assistive device for ambulation prior to admission   Restrictions/Precautions   Weight Bearing Precautions Per Order Yes   LUE Weight Bearing Per Order NWB  (In shoulder abduction sling)   Braces or Orthoses Sling  (Shoulder abduction sling)   Other Precautions WBS; Multiple lines; Fall Risk;Pain   General   Additional Pertinent History During stair training in PT gym patient had a self reported " dizzy episode" with b/l knee buckling  Mod A x 1 was required by PT to correct LOB and prevent fall  Mod A needed to maintain upright standing balance  after approx 5 sec pt reported feeling " back to normal"  Pt also reports " I get these episodes all the time at home where my knees buckle and thats what causes me to fall"  Pt denies any additional dizziness when ambulating back to room and denies any additional dizziness or lightheadedness seated OOB in chair  Marilee Barraza RN was made aware      Family/Caregiver Present No   Cognition   Overall Cognitive Status WFL   Arousal/Participation Alert   Orientation Level Oriented to person;Oriented to place;Oriented to time   Memory Within functional limits   Following Commands Follows one step commands without difficulty   RUE Assessment   RUE Assessment WFL   LUE Assessment   LUE Assessment X   RLE Assessment   RLE Assessment WFL   Strength RLE   RLE Overall Strength 4-/5   LLE Assessment   LLE Assessment WFL   Strength LLE   LLE Overall Strength 4-/5   Bed Mobility   Additional Comments NA, patient out of bed in chair at time of PT eval   Transfers   Sit to Stand 4  Minimal assistance   Additional items Assist x 1; Increased time required;Verbal cues   Stand to Sit 4  Minimal assistance   Additional items Assist x 1; Increased time required;Verbal cues   Additional Comments Verbal cues and tactile cues needed for safety   Ambulation/Elevation   Gait pattern Excessively slow; Short stride; Foward flexed; Inconsistent gil   Gait Assistance 4  Minimal assist  (Mod A x1 needed correct LOB with ambulation )   Additional items Assist x 1   Assistive Device None   Distance 45ft x 2    Balance   Static Sitting Fair -   Static Standing Poor +   Ambulatory Poor +   Endurance Deficit   Endurance Deficit Yes   Endurance Deficit Description fatigue, pain    Activity Tolerance   Activity Tolerance Patient limited by fatigue;Patient limited by pain   Medical Staff Made Aware DWAYNE Ornelas; Derrick Florez OT student    Nurse Made Aware Patient appropriate to be seen and mobilized per nursing   Assessment   Prognosis Good   Problem List Decreased strength;Decreased range of motion;Decreased endurance; Impaired balance;Decreased mobility; Decreased safety awareness;Pain;Orthopedic restrictions   Assessment Pt is 56 y o  female seen for PT evaluation s/p admit to [facility] on 3/30/2021  Two pt identifiers were used to confirm  Pt presented for scheduled Left Total Shoulder Arthroplasty with Long Head Biceps Tenodesis which was performed on 03/30/2021  Pt was admitted with a primary dx of:  S/p Left Total Shoulder Arthroplasty with Long Head Biceps Tenodesis  PT now consulted for assessment of mobility and d/c needs  Pt with Activity as tolerated orders    Pts current co morbidities affecting treatment include:  Bipolar disorder, cervical radiculopathy, fibromyalgia, HTN, hypokalemia, migraine, psychiatric disorder, spinal stenosis, syncope, and personal factors including steps to enter home and being alone at times  Pts current clinical presentation is Unstable/ Unpredictable (high complexity) due to Ongoing medical management for primary dx, Decreased activity tolerance compared to baseline, Fall risk, Increased assistance needed from caregiver at current time, Current WBS, Continuous pulse oximetry monitoring , s/p surgical intervention    Upon evaluation, pt currently is requiring ; Min Ax1 for transfers and Min Ax1 for ambulation w/ no AD   During stair training in PT gym patient had a self reported " dizzy episode" with b/l knee buckling  Mod A x 1 was required by PT to correct LOB and prevent fall  Mod A needed to maintain upright standing balance  after approx 5 sec pt reported feeling " back to normal"  Pt also reports " I get these episodes all the time at home where my knees buckle and thats what causes me to fall"  Pt denies any additional dizziness when ambulating back to room and denies any additional dizziness or lightheadedness seated OOB in chair  Matthew Schroeder RN was made aware  Pt presents at PT eval functioning below baseline and currently w/ overall mobility deficits 2* to: BLE weakness, decreased ROM, impaired balance, decreased endurance, gait deviations, pain, decreased activity tolerance compared to baseline, decreased safety awareness, fall risk, orthopedic restrictions  Pt currently at a fall risk 2* to impairments listed above  Based on the aforementioned PT evaluation, pt will continue to benefit from skilled Acute PT interventions to address stated impairments; to maximize functional mobility; for ongoing pt/ family training; and DME needs  At conclusion of PT session pt returned back in chair with phone and call bell within reach  PT is currently recommending Rehab     PT will continue to follow during hospital stay  Barriers to Discharge Inaccessible home environment;Decreased caregiver support   Goals   Patient Goals " to get better"   STG Expiration Date 04/10/21   Short Term Goal #1 In 10 days pt will complete: 1) Bed mobility skills with mod I while maintaining NWB to LUE to increase safety and independence as well as decrease caregiver burden  2) Functional transfers with mod I while maintaining NWB to LUE to promote increased independence, safety, and QOL  3) Ambulate 300' using least restrictive AD with mod I while maintaining NWB to LUE without LOB and stable vitals so that pt can negotiate previous living environment safely and promote independence with functional mobility and return to PLOF  4) Stair training up/ down 3 step/s using rail/s with mod I while maintaining NWB to LUE so that pt can enter/negotiate previous living environment safely and decrease fall risk  5) Improve balance grades by 1/2 grade to increase safety with all mobility and decrease fall risk  6) Improve BLE strength by 1/2 grade to help increase overall functional mobility and decrease fall risk  Plan   Treatment/Interventions Functional transfer training;LE strengthening/ROM; Elevations; Therapeutic exercise; Endurance training;Patient/family training;Equipment eval/education; Bed mobility;Gait training;Spoke to nursing;OT   PT Frequency Other (Comment)  (3-6x a week )   Recommendation   PT Discharge Recommendation Post-Acute Rehabilitation Services   Equipment Recommended   (Continue to assess)   PT - OK to Discharge Yes  (To rehab when medically cleared)   [address] [place] [address] Mobility Inpatient   Turning in Bed Without Bedrails 3   Lying on Back to Sitting on Edge of Flat Bed 3   Moving Bed to Chair 3   Standing Up From Chair 3   Walk in Room 3   Climb 3-5 Stairs 2   Basic Mobility Inpatient Raw Score 17   Basic Mobility Standardized Score 39 67   Modified Neris Scale   Modified Neris Scale 4   Barthel Index   Feeding 10   Bathing 0   Grooming Score 0   Dressing Score 0   Bladder Score 10   Bowels Score 10   Toilet Use Score 5   Transfers (Bed/Chair) Score 10   Mobility (Level Surface) Score 0  (< 50 yds during PT eval )   Stairs Score 5   Barthel Index Score 50   Portions of the documentation may have been created using voice recognition software  Occasional wrong word or sound alike substitutions may have occurred due to the inherent limitations of the voice recognition software  Read the chart carefully and recognize, using context, where substitutions have occurred      Tariq Wallace, PT, DPT

## 2021-03-31 NOTE — ANESTHESIA PROCEDURE NOTES
Peripheral Block    Patient location during procedure: holding area  Start time: 3/31/2021 1:30 PM  Reason for block: at surgeon's request and post-op pain management  Staffing  Anesthesiologist: Roxane Palomino MD  Performed: anesthesiologist   Preanesthetic Checklist  Completed: patient identified, site marked, surgical consent, pre-op evaluation, timeout performed, IV checked, risks and benefits discussed and monitors and equipment checked  Peripheral Block  Patient position: sitting  Prep: ChloraPrep  Patient monitoring: continuous pulse ox and frequent blood pressure checks  Block type: interscalene  Laterality: left  Procedures: ultrasound guided, Ultrasound guidance required for the procedure to increase accuracy and safety of medication placement and decrease risk of complications  Ultrasound permanent image savedbupivacaine (MARCAINE) 0 5 % perineural infiltration, 5 mL  Needle  Needle type: Stimuplex   Needle gauge: 20g  Needle length: 4in  Needle localization: ultrasound guidance  Test dose: negative  Assessment  Injection assessment: incremental injection and local visualized surrounding nerve on ultrasound  Paresthesia pain: none  Post-procedure:  site cleaned and adhesive bandage applied  patient tolerated the procedure well with no immediate complications  Additional Notes  With Exparel 20 mL   Intercostobrachial field block with bupi 0 5% 7 mL

## 2021-03-31 NOTE — OCCUPATIONAL THERAPY NOTE
Occupational Therapy Evaluation     Patient Name: John Bo  UTJNK'S Date: 3/31/2021  Problem List  Principal Problem:    Status post total shoulder arthroplasty, left  Active Problems:    Primary osteoarthritis of left shoulder    Past Medical History  Past Medical History:   Diagnosis Date    Adrenal insufficiency (Craig's disease) ([facility] )     Bipolar disorder     C  difficile diarrhea     Cervical radiculopathy     Chronic back pain     Chronic pain disorder     lumbar    DVT, lower extremity ([facility] ) 1991    Fibromyalgia     History of TIAs     cannot remember details    Hypertension     Hypokalemia     Migraine     MRSA (methicillin resistant Staphylococcus aureus) 07/20/2012    nasal swab negative 4/5/19    Psychiatric disorder     Anxiety, major depression, bipolar    Spinal stenosis     Stroke ([facility] )     tia    Syncope 2014    orthostatic hypotension    Wears dentures     upper     Past Surgical History  Past Surgical History:   Procedure Laterality Date    APPENDECTOMY      CAST APPLICATION Left 0/5/0970    Procedure: Application short-arm splint;  Surgeon: Octavia Ulloa MD;  [facility];  Service: Orthopedics    COLONOSCOPY      GASTRIC RESTRICTION SURGERY      Gastric Sleeve Nov 2015    HYSTERECTOMY      DONALD    JOINT REPLACEMENT      Left Knee 2011 and Right Hip 2010    ORIF WRIST FRACTURE Left 7/4/2020    Procedure: Open reduction and internal fixation left radius and ulnar shaft fracture;  Surgeon: Octavia Ulloa MD;  [facility];  Service: Orthopedics    WISDOM TOOTH EXTRACTION             03/31/21 0757   OT Last Visit   OT Visit Date 03/31/21   Note Type   Note type Evaluation   Restrictions/Precautions   Weight Bearing Precautions Per Order Yes   LUE Weight Bearing Per Order NWB  (IN SHOULDER ABDUCTION SLING)   Braces or Orthoses Sling  (SHOULDER ABDUCTION SLING)   Other Precautions Fall Risk;Pain;WBS   Pain Assessment   Pain Assessment Tool 0-10   Pain Score 4   Pain Location/Orientation Orientation: Left; Location: Shoulder   Hospital Pain Intervention(s) Ambulation/increased activity;Repositioned   Home Living   Type of [address] Two level; Able to live on main level with bedroom/bathroom;Stairs to enter with rails  (3 BECCA)   Bathroom Shower/Tub Walk-in shower   Bathroom Toilet Raised   Bathroom Equipment Grab bars in shower; Shower chair;Commode  (PT USING SC AT BASELINE )   Home Equipment Walker;Cane  (PT NOT USING AT BASELINE )   Prior Function   Level of [place] Independent with ADLs and functional mobility   Lives With Spouse   Receives Help From Family   ADL Assistance Independent   IADLs Needs assistance   Falls in the last 6 months 1 to 4  (3, PER PT)   Vocational Retired   Lifestyle   [employer] PT WAS INDEPENDENT W/ ADLS/IADLS AT [address]  PT HAS DRIVERS LICENSE, HOWEVER, HAS NOT DRIVEN IN 6 MONTHS DUE TO SHOULDER  Reciprocal Relationships PT LIVES W/  WHO WORKS AS A  AND IS GONE DURING DAY/NIGHT, HOWEVER, PT REPORTS HE IS HOME EVERYDAY AROUND 4PM  PT REPORTS NO OTHER LOCAL FAMILY/FRIENDS IS ABLE TO ASSIST  PT REPORTS THAT HER DAUGHTER MAY BE ABLE TO STAY W/ HER FOR 1-2 WEEKS UPON D/C, FURTHER CLARIFICATION IS NEEDED  Service to Others PT IS RETIRED, PREVIOUSLY WORKED FOR THE POST OFFICE  Intrinsic Gratification PT ENJOYS READING AND WATCHING TV     Psychosocial   Psychosocial (WDL) WDL   ADL   Eating Assistance 5  Supervision/Setup   Grooming Assistance 5  Supervision/Setup   UB Bathing Assistance 3  Moderate Assistance   LB Bathing Assistance 3  Moderate Assistance   UB Dressing Assistance 3  Moderate Assistance   LB Dressing Assistance 3  Moderate Assistance   Toileting Assistance  3  Moderate Assistance   Functional Assistance 4  Minimal Assistance   Bed Mobility   Supine to Sit Unable to assess  (PT OOB IN CHAIR UPON ARRIVAL )   Sit to Supine Unable to assess  (PT LEFT SEATED IN CHAIR W/ ALL NEEDS IN REACH )   Transfers   Sit to Stand 4  Minimal assistance   Additional items Assist x 1; Increased time required;Verbal cues   Stand to Sit 4  Minimal assistance   Additional items Assist x 1; Increased time required;Verbal cues   Functional Mobility   Functional Mobility 4  Minimal assistance   Additional Comments ASSIST X 1 W/ INCREASED TIME AND VC FOR SAFETY  PT HAD A LOB REQUIRING MAX A DUE TO DIZZY EPISODE PER PT    Balance   Static Sitting Fair -   Static Standing Poor +   Ambulatory Poor +   Activity Tolerance   Activity Tolerance Patient limited by fatigue;Patient limited by pain   Medical Staff Made Aware JUANA, OT; Albert Aden, PT   Nurse Made Aware PT APPROPRIATE TO SEE PER NURSING  RUE Assessment   RUE Assessment WFL   LUE Assessment   LUE Assessment X  (NWB IN ABDUCTION SLING)   Cognition   Overall Cognitive Status WFL   Arousal/Participation Alert; Cooperative   Attention Within functional limits   Orientation Level Oriented X4   Memory Within functional limits   Following Commands Follows one step commands without difficulty   Comments PT PLEASANT AND COOPERATIVE DURING SESSION  PT HAD LIMITED INSIGHT INTO DEFICITS AND DECREASED SAFETY AWARENESS DUE TO PREVIOUS LEVEL OF INDEPENDENCE, PT EDUCATED ON SAFETY AND REQUIRING ASSISTANCE TO PERFORM ADLS  Assessment   Limitation Decreased ADL status; Decreased endurance;Decreased self-care trans;Decreased high-level ADLs   Prognosis Good   Assessment PT R-HAND DOMINANT 59 YO SEEN FOR INITIAL OT EVALUATION S/P L TSA W/ LONG HEAD BICEPS TENODESIS ON 3/30/2021  PT IS NWB LUE IN SHOULDER ABDUCTION SLING PER ORTHO  PT'S PMH INCLUDES L SHOULDER OSTEOARTHRITIS, ANEMIA, HLD, HTN, SAMUEL'S DISEASE, HYPOKALEMIA, FIBROMYALGIA, AND ANXIETY  PT RESIDES W/  IN A 2 SH W/ A 1ST FLOOR SET UP  PT'S  WORKS AS A  AND CAN PROVIDE LIMITED ASSISTANCE  PT WAS INDEPENDENT W/ ADLS/IADLS AT BASELINE  PT CURRENTLY MOD A FOR LB/UB ADLS   PT IS MIN A FOR FUNCTIONAL TRANSFERS AND MIN A FOR FUNCTIONAL MOBILITY  PT HAD A LOSS OF BALANCE DURING FUNCTIONAL MOBILITY REQUIRING MAX A, PT LABELLED IT A SUDDEN DIZZY EPISODE  PT LIMITED DUE TO WBS, PAIN, TSA PRECAUTIONS, FATIGUE, LIMITED INSIGHT INTO DEFICITS/DECREASED SAFETY AWARENESS DUE TO PREVIOUS LEVEL OF INDEPENDENCE, DECREASED BALANCE, DECREASED ACTIVITY TOLERANCE, FALL RISK, DIZZINESS, AND LIMITED SUPPORT  PT EDUCATED ON WBS, TSA PRECAUTIONS, SLING MANAGEMENT, BODY MECHANICS, SAFETY, COMPENSATORY TECHNIQUES, ONE HANDED DRESSING TECHNIQUES, REQUIRING ASSISTANCE FOR ADLS, AND ENERGY CONSERVATION TECHNIQUES  The patient's raw score on the AM-PAC Daily Activity inpatient short form is 14, standardized score is 33 39, less than 39 4  Patients at this level are likely to benefit from discharge to post-acute rehabilitation services  Please refer to the recommendation of the Occupational Therapist for safe discharge planning  FROM OT PERSPECTIVE, D/C REC REHAB  CONT TO FOLLOW-UP 3-5X/WEEK TO ADDRESS THE FOLLOWING GOALS  Goals   Patient Goals TO GO HOME   LTG Time Frame 10-14   Long Term Goal #1 SEE BELOW   Plan   Treatment Interventions ADL retraining;Functional transfer training; Endurance training;Patient/family training;Equipment evaluation/education; Compensatory technique education; Energy conservation   Goal Expiration Date 04/14/21   OT Frequency 3-5x/wk   Recommendation   OT Discharge Recommendation Post-Acute Rehabilitation Services   OT - OK to Discharge Yes   AM-PAC Daily Activity Inpatient   Lower Body Dressing 2   Bathing 2   Toileting 2   Upper Body Dressing 2   Grooming 3   Eating 3   Daily Activity Raw Score 14   Daily Activity Standardized Score (Calc for Raw Score >=11) 33 39   AM-PAC Applied Cognition Inpatient   Following a Speech/Presentation 3   Understanding Ordinary Conversation 4   Taking Medications 4   Remembering Where Things Are Placed or Put Away 4   Remembering List of 4-5 Errands 3   Taking Care of Complicated Tasks 3   Applied Cognition Raw Score 21   Applied Cognition Standardized Score 44 3   Modified [place] Scale   Modified [place] Scale 4     OT GOALS:    PT WILL COMPLETE BED MOBILITY AT MOD I LEVEL TO ENGAGE IN ADLS AT EOB DEMONSTRATING G CARRY OVER OF WBS/ PRECAUTIONS  PT WILL COMPLETE FUNCTIONAL TRANSFERS AT MOD I LEVEL TO ENGAGE IN ADLS DEMONSTRATING G CARRY OVER OF WBS/BODY MECHANICS  PT WILL COMPLETE FUNCTIONAL MOBILITY AT MOD I LEVEL TO ENGAGE IN ADLS DEMONSTRATING G BALANCE AND SAFETY  PT WILL COMPLETE ADLS W/ MIN A WHILE SEATED DEMONSTRATING G CARRY OVER OF WBS/PRECAUTIONS/ONE HANDED DRESSING TECHNIQUES/COMPENSATORY STRATEGIES/ADAPTIVE TECHNIQUES/SLING MANAGEMENT  PT WILL TOLERATE ACTIVITY FOR 30 MIN TO ENGAGE IN I/ADLS DEMONSTRATING G CARRY OVER OF ENERGY CONSERVATION TECHNIQUES  PT WILL COMPLETE IADLS AT MOD I LEVEL DEMONSTRATING G CARRY OVER OF WBS/PRECAUTIONS         LUISA Mckoy

## 2021-03-31 NOTE — PROGRESS NOTES
Orthopedics   Skylar Foley 56 y o  female MRN: [id number]  Unit/Bed#: -01      Subjective:  56 y  o female post operative day 1 left total shoulder arthroplasty  Patient doing well  Block has worn off  Patient requiring pain medications PO and IV  Having some itching not in involved operative areas  No other acute complaints       Labs:  0   Lab Value Date/Time    HCT 40 1 03/17/2021 1421    HCT 31 1 (L) 07/06/2020 0513    HCT 31 3 (L) 07/05/2020 0532    HCT 38 1 07/07/2015 1604    HCT 33 9 (L) 07/03/2015 0537    HCT 33 3 (L) 07/01/2015 0516    HGB 13 1 03/17/2021 1421    HGB 10 0 (L) 07/06/2020 0513    HGB 10 1 (L) 07/05/2020 0532    HGB 12 3 07/07/2015 1604    HGB 10 9 (L) 07/03/2015 0537    HGB 10 8 (L) 07/01/2015 0516    INR 1 15 03/17/2021 1421    INR 0 90 05/07/2015 1030    WBC 9 61 03/17/2021 1421    WBC 10 96 (H) 07/06/2020 0513    WBC 10 83 (H) 07/05/2020 0532    WBC 7 66 07/07/2015 1604    WBC 8 47 07/03/2015 0537    WBC 8 02 07/01/2015 0516    ESR 24 (H) 10/05/2019 0548       Meds:    Current Facility-Administered Medications:     acetaminophen (TYLENOL) tablet 650 mg, 650 mg, Oral, Q6H PRN, Alexandrea Guillory PA-C    apixaban (ELIQUIS) tablet 5 mg, 5 mg, Oral, BID, Alexandrea Guillory PA-C    ARIPiprazole (ABILIFY) tablet 5 mg, 5 mg, Oral, Daily, Alexandrea Guillory PA-C    calcium carbonate (TUMS) chewable tablet 1,000 mg, 1,000 mg, Oral, Daily PRN, Alexandrea Guillory PA-C    cholecalciferol (VITAMIN D3) tablet 5,000 Units, 5,000 Units, Oral, Daily, Alexandrea Guillory PA-C, 5,000 Units at 03/30/21 1628    docusate sodium (COLACE) capsule 100 mg, 100 mg, Oral, BID, AVINASH Diaz-YO, 100 mg at 03/30/21 1742    Erenumab-aooe SOAJ 140 mg, 140 mg, Subcutaneous, Q30 Days, Alexandrea Guillory PA-C    ferrous sulfate tablet 325 mg, 325 mg, Oral, Daily With Breakfast, Alexandrea Guillory PA-C    fluvoxaMINE (LUVOX) tablet 300 mg, 300 mg, Oral, HS, Alexandrea Guillory PA-C, 300 mg at 03/30/21 2214    gabapentin (NEURONTIN) capsule 300 mg, 300 mg, Oral, TID, Darell Quant, PA-C, 300 mg at 03/30/21 2036    lactated ringers bolus 1,000 mL, 1,000 mL, Intravenous, Once PRN **AND** lactated ringers bolus 1,000 mL, 1,000 mL, Intravenous, Once PRN, Darell Quant, PA-C    lactated ringers infusion, 125 mL/hr, Intravenous, Continuous, Darell Quant, PA-C, Last Rate: 125 mL/hr at 03/30/21 1442, 125 mL/hr at 03/30/21 1442    lactated ringers infusion, 100 mL/hr, Intravenous, Continuous, Darell Quant, PA-C, Stopped at 03/30/21 1630    lamoTRIgine (LaMICtal) tablet 200 mg, 200 mg, Oral, Daily, Darell Quant, PA-C    LORazepam (ATIVAN) tablet 2 mg, 2 mg, Oral, 4x Daily, Darell Quant, PA-C, 2 mg at 03/30/21 2214    meclizine (ANTIVERT) tablet 12 5 mg, 12 5 mg, Oral, Q8H PRN, Darell Quant, PA-C    morphine (PF) 10 mg/mL injection 2 mg, 2 mg, Intravenous, Q2H PRN, Darell Quant, PA-C, 2 mg at 03/31/21 0303    ondansetron (ZOFRAN) injection 4 mg, 4 mg, Intravenous, Q6H PRN, Darell Quant, PA-C    oxyCODONE (ROXICODONE) IR tablet 10 mg, 10 mg, Oral, Q4H PRN, Darell Quant, PA-C, 10 mg at 03/31/21 0540    oxyCODONE (ROXICODONE) IR tablet 5 mg, 5 mg, Oral, Q4H PRN, Darell Quant, PA-C    potassium chloride (K-DUR,KLOR-CON) CR tablet 40 mEq, 40 mEq, Oral, Daily, Darell Quant, PA-C, 40 mEq at 03/30/21 1628    sodium chloride 0 9 % bolus 1,000 mL, 1,000 mL, Intravenous, Once PRN **AND** sodium chloride 0 9 % bolus 1,000 mL, 1,000 mL, Intravenous, Once PRN, Darell Johnson PA-C    Blood Culture:   No results found for: BLOODCX    Wound Culture:   No results found for: WOUNDCULT    Ins and Outs:  I/O last 24 hours:   In: 900 [I V :900]  Out: -           Physical:  Vitals:    03/31/21 0313   BP: 133/78   Pulse: 88   Resp: 18   Temp: 98 1 °F (36 7 °C)   SpO2: (!) 89%     left upper extremity  · Dressings clean dry intact  · Sensation intact to axillary, musculocutaneous, radial, ulna, median nerves  · Motor intact to axillary, musculocutaneous, radial, ulna, median nerves  · 2+ Radial pulse    Assessment: 56 y  o female post operative day 1 left total shoulder arthroplasty   Doing well    Plan:  · Nonweight Bearing left upper extremity  · Up and out of bed  · Sling for Comfort, may remove for pendulums and hygiene  · DVT prophylaxis  · Ice and analgesics  · Will continue to assess for acute blood loss anemia      Sherly Melendez PA-C

## 2021-03-31 NOTE — PLAN OF CARE
Problem: PHYSICAL THERAPY ADULT  Goal: Performs mobility at highest level of function for planned discharge setting  See evaluation for individualized goals  Description: Treatment/Interventions: Functional transfer training, LE strengthening/ROM, Elevations, Therapeutic exercise, Endurance training, Patient/family training, Equipment eval/education, Bed mobility, Gait training, Spoke to nursing, OT  Equipment Recommended: (Continue to assess)       See flowsheet documentation for full assessment, interventions and recommendations  Note: Prognosis: Good  Problem List: Decreased strength, Decreased range of motion, Decreased endurance, Impaired balance, Decreased mobility, Decreased safety awareness, Pain, Orthopedic restrictions  Assessment: Pt is 56 y o  female seen for PT evaluation s/p admit to [facility] on 3/30/2021  Two pt identifiers were used to confirm  Pt presented for scheduled Left Total Shoulder Arthroplasty with Long Head Biceps Tenodesis which was performed on 03/30/2021  Pt was admitted with a primary dx of:  S/p Left Total Shoulder Arthroplasty with Long Head Biceps Tenodesis  PT now consulted for assessment of mobility and d/c needs  Pt with Activity as tolerated orders  Pts current co morbidities affecting treatment include:  Bipolar disorder, cervical radiculopathy, fibromyalgia, HTN, hypokalemia, migraine, psychiatric disorder, spinal stenosis, syncope, and personal factors including steps to enter home and being alone at times  Pts current clinical presentation is Unstable/ Unpredictable (high complexity) due to Ongoing medical management for primary dx, Decreased activity tolerance compared to baseline, Fall risk, Increased assistance needed from caregiver at current time, Current WBS, Continuous pulse oximetry monitoring , s/p surgical intervention    Upon evaluation, pt currently is requiring ; Min Ax1 for transfers and Min Ax1 for ambulation w/ no AD   During stair training in PT gym patient had a self reported " dizzy episode" with b/l knee buckling  Mod A x 1 was required by PT to correct LOB and prevent fall  Mod A needed to maintain upright standing balance  after approx 5 sec pt reported feeling " back to normal"  Pt also reports " I get these episodes all the time at home where my knees buckle and thats what causes me to fall"  Pt denies any additional dizziness when ambulating back to room and denies any additional dizziness or lightheadedness seated OOB in chair  Hilda Mayo RN was made aware  Pt presents at PT eval functioning below baseline and currently w/ overall mobility deficits 2* to: BLE weakness, decreased ROM, impaired balance, decreased endurance, gait deviations, pain, decreased activity tolerance compared to baseline, decreased safety awareness, fall risk, orthopedic restrictions  Pt currently at a fall risk 2* to impairments listed above  Based on the aforementioned PT evaluation, pt will continue to benefit from skilled Acute PT interventions to address stated impairments; to maximize functional mobility; for ongoing pt/ family training; and DME needs  At conclusion of PT session pt returned back in chair with phone and call bell within reach  PT is currently recommending Rehab  PT will continue to follow during hospital stay  Barriers to Discharge: Inaccessible home environment, Decreased caregiver support     PT Discharge Recommendation: [phone]     PT - OK to Discharge: Yes(To rehab when medically cleared)    See flowsheet documentation for full assessment

## 2021-03-31 NOTE — CASE MANAGEMENT
Pt is not a <30 day readmission or a bundle  Risk of unplanned readmission score is 15 (green)  Pt has a high utilizer plan in place  Pt s/p left total shoulder replacement  CM spoke with pt to introduce CM role and begin discharge planning  Pt lives with her , Benny Mendez ([phone]) in a 3 story house that has 3 BECCA  Pt reports being independent with ADLs PTA, no use of DME but access to a cane, rolling walker, shower chair, and bedside commode  Pt reports history of [facility] and history of STR at [address] [facility] and [facility]  Pt reports history of inpatient [address] treatment at [facility] in 2017, pt follows outpatient with therapist and psychiatrist  Pt reports no history of D/A treatment  Pt drives and is retired  PCP is Dr Travis Palafox ([phone]) and pt uses [facility] in [facility] for prescriptions  CM reviewed STR recommendation with pt who reports that her first choice is [facility], if [facility] is unable to accept she would prefer [address] [facility]  Referrals to [facility] and [address] [facility] placed via [facility], awaiting responses  CM department to follow  CM reviewed d/c planning process including the following: identifying help at home, patient preference for d/c planning needs, Discharge Lounge, [facility] Meds to Bed program, availability of treatment team to discuss questions or concerns patient and/or family may have regarding understanding medications and recognizing signs and symptoms once discharged  CM also encouraged patient to follow up with all recommended appointments after discharge  Patient advised of importance for patient and family to participate in managing patients medical well being

## 2021-04-01 LAB
ANION GAP SERPL CALCULATED.3IONS-SCNC: 3 MMOL/L (ref 4–13)
BUN SERPL-MCNC: 18 MG/DL (ref 5–25)
CALCIUM SERPL-MCNC: 9.2 MG/DL (ref 8.3–10.1)
CHLORIDE SERPL-SCNC: 108 MMOL/L (ref 100–108)
CO2 SERPL-SCNC: 29 MMOL/L (ref 21–32)
CREAT SERPL-MCNC: 0.95 MG/DL (ref 0.6–1.3)
GFR SERPL CREATININE-BSD FRML MDRD: 64 ML/MIN/1.73SQ M
GLUCOSE SERPL-MCNC: 82 MG/DL (ref 65–140)
POTASSIUM SERPL-SCNC: 4.9 MMOL/L (ref 3.5–5.3)
SODIUM SERPL-SCNC: 140 MMOL/L (ref 136–145)

## 2021-04-01 PROCEDURE — 99024 POSTOP FOLLOW-UP VISIT: CPT | Performed by: PHYSICIAN ASSISTANT

## 2021-04-01 PROCEDURE — 97116 GAIT TRAINING THERAPY: CPT

## 2021-04-01 PROCEDURE — 80048 BASIC METABOLIC PNL TOTAL CA: CPT | Performed by: PHYSICIAN ASSISTANT

## 2021-04-01 PROCEDURE — 99253 IP/OBS CNSLTJ NEW/EST LOW 45: CPT | Performed by: REGISTERED NURSE

## 2021-04-01 PROCEDURE — 97530 THERAPEUTIC ACTIVITIES: CPT

## 2021-04-01 RX ORDER — POLYETHYLENE GLYCOL 3350 17 G/17G
17 POWDER, FOR SOLUTION ORAL DAILY PRN
Status: DISCONTINUED | OUTPATIENT
Start: 2021-04-01 | End: 2021-04-02

## 2021-04-01 RX ORDER — ACETAMINOPHEN 325 MG/1
975 TABLET ORAL EVERY 8 HOURS SCHEDULED
Status: DISCONTINUED | OUTPATIENT
Start: 2021-04-01 | End: 2021-04-06 | Stop reason: HOSPADM

## 2021-04-01 RX ORDER — HYDROMORPHONE HCL/PF 1 MG/ML
0.5 SYRINGE (ML) INJECTION EVERY 4 HOURS PRN
Status: DISCONTINUED | OUTPATIENT
Start: 2021-04-01 | End: 2021-04-02

## 2021-04-01 RX ORDER — OXYCODONE HYDROCHLORIDE 5 MG/1
5 TABLET ORAL EVERY 4 HOURS PRN
Status: DISCONTINUED | OUTPATIENT
Start: 2021-04-01 | End: 2021-04-06 | Stop reason: HOSPADM

## 2021-04-01 RX ORDER — SENNOSIDES 8.6 MG
1 TABLET ORAL 2 TIMES DAILY
Status: DISCONTINUED | OUTPATIENT
Start: 2021-04-01 | End: 2021-04-02

## 2021-04-01 RX ORDER — OXYCODONE HYDROCHLORIDE 5 MG/1
2.5 TABLET ORAL EVERY 4 HOURS PRN
Status: DISCONTINUED | OUTPATIENT
Start: 2021-04-01 | End: 2021-04-06 | Stop reason: HOSPADM

## 2021-04-01 RX ORDER — METHOCARBAMOL 500 MG/1
500 TABLET, FILM COATED ORAL EVERY 6 HOURS SCHEDULED
Status: DISCONTINUED | OUTPATIENT
Start: 2021-04-01 | End: 2021-04-06 | Stop reason: HOSPADM

## 2021-04-01 RX ORDER — SENNOSIDES 8.6 MG
1 TABLET ORAL
Status: DISCONTINUED | OUTPATIENT
Start: 2021-04-01 | End: 2021-04-01

## 2021-04-01 RX ADMIN — ARIPIPRAZOLE 5 MG: 5 TABLET ORAL at 09:18

## 2021-04-01 RX ADMIN — OXYCODONE HYDROCHLORIDE 10 MG: 10 TABLET ORAL at 01:55

## 2021-04-01 RX ADMIN — GABAPENTIN 300 MG: 300 CAPSULE ORAL at 22:10

## 2021-04-01 RX ADMIN — METHOCARBAMOL 500 MG: 500 TABLET, FILM COATED ORAL at 17:08

## 2021-04-01 RX ADMIN — FLUVOXAMINE MALEATE 300 MG: 50 TABLET ORAL at 22:12

## 2021-04-01 RX ADMIN — LORAZEPAM 2 MG: 1 TABLET ORAL at 17:08

## 2021-04-01 RX ADMIN — APIXABAN 5 MG: 5 TABLET, FILM COATED ORAL at 09:17

## 2021-04-01 RX ADMIN — FERROUS SULFATE TAB 325 MG (65 MG ELEMENTAL FE) 325 MG: 325 (65 FE) TAB at 09:17

## 2021-04-01 RX ADMIN — Medication 5000 UNITS: at 09:17

## 2021-04-01 RX ADMIN — DOCUSATE SODIUM 100 MG: 100 CAPSULE, LIQUID FILLED ORAL at 17:09

## 2021-04-01 RX ADMIN — OXYCODONE HYDROCHLORIDE 10 MG: 10 TABLET ORAL at 05:51

## 2021-04-01 RX ADMIN — ACETAMINOPHEN 975 MG: 325 TABLET, FILM COATED ORAL at 22:10

## 2021-04-01 RX ADMIN — ACETAMINOPHEN 975 MG: 325 TABLET, FILM COATED ORAL at 13:20

## 2021-04-01 RX ADMIN — GABAPENTIN 300 MG: 300 CAPSULE ORAL at 15:40

## 2021-04-01 RX ADMIN — LORAZEPAM 2 MG: 1 TABLET ORAL at 09:17

## 2021-04-01 RX ADMIN — SENNOSIDES 8.6 MG: 8.6 TABLET, FILM COATED ORAL at 17:09

## 2021-04-01 RX ADMIN — LAMOTRIGINE 200 MG: 100 TABLET ORAL at 09:17

## 2021-04-01 RX ADMIN — LORAZEPAM 2 MG: 1 TABLET ORAL at 22:11

## 2021-04-01 RX ADMIN — GABAPENTIN 300 MG: 300 CAPSULE ORAL at 09:17

## 2021-04-01 RX ADMIN — APIXABAN 5 MG: 5 TABLET, FILM COATED ORAL at 17:08

## 2021-04-01 RX ADMIN — POTASSIUM CHLORIDE 40 MEQ: 1500 TABLET, EXTENDED RELEASE ORAL at 09:17

## 2021-04-01 NOTE — PROGRESS NOTES
Internal Medicine Progress Note  Patient: Travon Freeman  Age/sex: 56 y o  female  Medical Record #: [id number]      ASSESSMENT/PLAN: (Interval History)  Travon Freeman is seen and examined and management for following issues:    Status Post left Total SHOULDER ARTHROPLASTY   Pain somewhat controlled   Continue encourage incentive spirometry; monitor fever curve   DVT prophylaxis in place and reviewed  Results from last 7 days   Lab Units 03/31/21  0630   WBC Thousand/uL 15 42*   HEMOGLOBIN g/dL 12 5   HEMATOCRIT % 40 5   PLATELETS Thousands/uL 271         Operative acute blood loss anemia  · Decrease in hgb levels from preop labs results; suspect due to post operation extravasation losses along with potential dilutional effects of fluids  · Monitor follow up CBC post intervention to trend effect    H/o DVT/PE  · Takes eliquis 5mg bid  · Resumed 3/31     Bipolar/depression/anxiety  · Takes multiple medications including high dose ativan 2mg 4x daily  · Continue home regimen as ordered without interruption     Chronic migraines  · F/b [place] [facility] and neurology  · Cont current regimen     Hyperlipidemia  · Low cholesterol diet  · Continue statin therapy    Hypoxia  · Pulse oximetry without good tracing  · Encourage incentive spirometry  · Monitor respiratory status     [place]'s disease  · No documentation regarding chronic steroids  · Verified patient does not take chronic steroids     Hypotension  · Has taken midodrine in the past  · Does not currently use     Hypokalemia  · Continue supplementation    Chronic dizziness/frequent falls  · No further episodes  · Awaiting placement     H/o gastric sleeve  · Cont PPI           PRE-OP HGB LEVEL: 13 1  The above assessment and plan was reviewed and updated as determined by my evaluation of the patient on 4/1/2021      Labs:   Results from last 7 days   Lab Units 03/31/21  0630   WBC Thousand/uL 15 42*   HEMOGLOBIN g/dL 12 5   HEMATOCRIT % 40 5 PLATELETS Thousands/uL 271     Results from last 7 days   Lab Units 04/01/21  0509 03/31/21  0507   SODIUM mmol/L 140 139   POTASSIUM mmol/L 4 9 4 7   CHLORIDE mmol/L 108 112*   CO2 mmol/L 29 21   BUN mg/dL 18 16   CREATININE mg/dL 0 95 1 10   CALCIUM mg/dL 9 2 9 2             Results from last 7 days   Lab Units 03/30/21  1153   POC GLUCOSE mg/dl 101       Review of Scheduled Meds:  Current Facility-Administered Medications   Medication Dose Route Frequency Provider Last Rate    acetaminophen  650 mg Oral Q6H PRN Darell Quant, PA-C      apixaban  5 mg Oral BID Darell Quant, PA-C      ARIPiprazole  5 mg Oral Daily Darell Quant, PA-C      calcium carbonate  1,000 mg Oral Daily PRN Darell Quant, PA-C      cholecalciferol  5,000 Units Oral Daily Darell Quant, PA-C      docusate sodium  100 mg Oral BID Darell Quant, PA-C      ferrous sulfate  325 mg Oral Daily With Breakfast Darell Quant, PA-C      fluvoxaMINE  300 mg Oral HS Darell Quant, PA-C      gabapentin  300 mg Oral TID Darell Quant, PA-C      lactated ringers  125 mL/hr Intravenous Continuous Darell Quant, PA-C Stopped (03/31/21 5408)    lamoTRIgine  200 mg Oral Daily Darell Quant, PA-C      LORazepam  2 mg Oral 4x Daily Darell Quant, PA-C      meclizine  12 5 mg Oral Q8H PRN Darell Quant, PA-C      morphine injection  2 mg Intravenous Q2H PRN Darell Quant, PA-C      ondansetron  4 mg Intravenous Q6H PRN Darell Quant, PA-C      oxyCODONE  10 mg Oral Q4H PRN Darell Quant, PA-C      oxyCODONE  5 mg Oral Q4H PRN Darell Quant, PA-C      potassium chloride  40 mEq Oral Daily Darell Quant, PA-C         Subjective/ HPI: Dannielle Cha seen and examined  Patient's overnight issues or events were reviewed with nursing or staff during rounds or morning huddle session  New or overnight issues include the following:     Pt awaiting placement at rehab secondary to frequent falls; pain somewhat controlled today    Some decreased readings on pulse oximetry however the pulse oximeter does not seem to have a good tracing  I also encouraged her to perform IS as she had not had one      ROS:   A 10 point ROS was performed; negative except as noted above  Imaging:     XR shoulder 2+ vw left    (Results Pending)       *Labs /Radiology studies Reviewed  *Medications  reviewed and reconciled as needed  *Please refer to order section for additional ordered labs studies  *Case discussed with primary attending during morning huddle case rounds    Physical Examination:  Vitals:   Vitals:    03/31/21 1858 03/31/21 2306 04/01/21 0305 04/01/21 0809   BP: 116/65 117/62 131/72 127/72   Pulse: 95  98 102   Resp: 18 18 18 17   Temp: 98 2 °F (36 8 °C) 98 2 °F (36 8 °C) 99 7 °F (37 6 °C) 99 5 °F (37 5 °C)   TempSrc:       SpO2: 92%  (!) 77% (!) 86%   Weight:       Height:           GEN: No apparent distress, interactive  NEURO: Alert and oriented x3  HEENT: Pupils are equal and reactive, EOMI, mucous membranes are moist, face symmetrical  CV: S1 S2 regular, no MRG, no peripheral edema noted  RESP: Lungs are clear bilaterally, no wheezes, rales or rhonchi noted, on room air, respirations easy and non labored  GI: Flat, soft non tender, non distended; +BS x4  : Voiding without difficulty  MUSC: Moves all extremities; except LUE with immobilizer in place  SKIN: pink, warm and dry, normal turgor, no rashes, lesions      The above physical exam was reviewed and updated as determined by my evaluation of the patient on 4/1/2021  Invasive Devices     Peripheral Intravenous Line            Peripheral IV 03/30/21 Right Hand 2 days                   VTE Pharmacologic Prophylaxis: Sequential pneumatic compression stocking  Code Status: Level 1 - Full Code  Current Length of Stay: 2 day(s)      Total time spent:  30 minutes with more than 50% spent counseling/coordinating care   Counseling includes discussion with patient re: progress  and discussion with patient of his/her current medical state/information  Coordination of patient's care was performed in conjunction with primary service  Time invested included review of patient's labs, vitals, and management of their comorbidities with continued monitoring  In addition, this patient was discussed with medical team including physician and advanced extenders  The care of the patient was extensively discussed and appropriate treatment plan was formulated unique for this patient  ** Please Note:  voice to text software may have been used in the creation of this document   Although proof errors in transcription or interpretation are a potential of such software**

## 2021-04-01 NOTE — CASE MANAGEMENT
This writer had a telephone conversation with patient  Information on [facility] services, anticipated length of stay, mandatory quarantine, visitation, and unit's current covid status provided  Per patient, she has not received the covid vaccine

## 2021-04-01 NOTE — PROGRESS NOTES
Peripheral Nerve Block Follow-up Note - Acute Pain Service    Waleska Scruggs 56 y o  female MRN: [id number]  Unit/Bed#: -01 Encounter: [id number]      Assessment:   Principal Problem:    Status post total shoulder arthroplasty, left  Active Problems:    Primary osteoarthritis of left shoulder    Waleska Scruggs is a 56y o  year old female  With past medical history significant for migraine disorder, DVT, bipolar disorder, fibromyalgia, osteoarthritis left shoulder who is now postoperative day 2  From left total shoulder arthroplasty  Patient initially received left interscalene block with Exparel with procedure however required we block on 03/31/2021 in the form of left interscalene peripheral nerve block  Plan:   - Left interscalene block has resolved appropriately with no residual deficits  - change acetaminophen to 975 mg  P o  q 8 hours scheduled  - continue oxycodone 5 mg p o  q 4 hours p r n  for moderate pain  - continue oxycodone 10 mg p o  q 4 hours p r n  for severe pain  - start methocarbamol 500 mg p o  Q 6 hours scheduled  - start Aqua K-pad to neck/upper back  - patient is on lorazepam 2 mg p o  4 times daily per primary team  - continue gabapentin 300 mg p o  t i d   - bowel regimen with docusate - would add scheduled senna, MiraLax daily as needed    APS will sign off at this time  Thank you for the consult  All opioids and other analgesics to be written at discretion of primary team  Please call  / [phone] or [facility] (/ between [phone] and on weekends) with questions or concerns    Pain History  Current pain location(s): neck, LUE, back  Pain Scale:   6-8  Quality: sore, tight  24 hour history:  Patient examined at bedside  Patient underwent ray block yesterday  Patient states her pain has been well controlled though she has had to take her as needed opioids pretty regularly  She states she has not had a bowel movement in the past 24 hours    She refers muscle tension in her neck which she attributes to sleeping in an odd position  Opioid requirement previous 24 hours:    oxycodone 40 mg    Meds/Allergies   all current active meds have been reviewed and current meds:   Current Facility-Administered Medications   Medication Dose Route Frequency    acetaminophen (TYLENOL) tablet 650 mg  650 mg Oral Q6H PRN    apixaban (ELIQUIS) tablet 5 mg  5 mg Oral BID    ARIPiprazole (ABILIFY) tablet 5 mg  5 mg Oral Daily    calcium carbonate (TUMS) chewable tablet 1,000 mg  1,000 mg Oral Daily PRN    cholecalciferol (VITAMIN D3) tablet 5,000 Units  5,000 Units Oral Daily    docusate sodium (COLACE) capsule 100 mg  100 mg Oral BID    ferrous sulfate tablet 325 mg  325 mg Oral Daily With Breakfast    fluvoxaMINE (LUVOX) tablet 300 mg  300 mg Oral HS    gabapentin (NEURONTIN) capsule 300 mg  300 mg Oral TID    lactated ringers infusion  125 mL/hr Intravenous Continuous    lamoTRIgine (LaMICtal) tablet 200 mg  200 mg Oral Daily    LORazepam (ATIVAN) tablet 2 mg  2 mg Oral 4x Daily    meclizine (ANTIVERT) tablet 12 5 mg  12 5 mg Oral Q8H PRN    morphine (PF) 10 mg/mL injection 2 mg  2 mg Intravenous Q2H PRN    ondansetron (ZOFRAN) injection 4 mg  4 mg Intravenous Q6H PRN    oxyCODONE (ROXICODONE) IR tablet 10 mg  10 mg Oral Q4H PRN    oxyCODONE (ROXICODONE) IR tablet 5 mg  5 mg Oral Q4H PRN    potassium chloride (K-DUR,KLOR-CON) CR tablet 40 mEq  40 mEq Oral Daily       Allergies   Allergen Reactions    Ketorolac Itching and Other (See Comments)     [toradol] Itching, hives    Mushroom Extract Complex - Food Allergy Hives       Objective     Temp:  [98 °F (36 7 °C)-99 7 °F (37 6 °C)] 99 5 °F (37 5 °C)  HR:  [] 102  Resp:  [17-20] 17  BP: (116-131)/(62-76) 127/72    Physical Exam  Vitals signs and nursing note reviewed  Constitutional:       Appearance: Normal appearance  She is not ill-appearing or toxic-appearing     HENT:      Head: Normocephalic and atraumatic  Eyes:      General: No scleral icterus  Conjunctiva/sclera: Conjunctivae normal    Cardiovascular:      Rate and Rhythm: Normal rate  Pulmonary:      Effort: Pulmonary effort is normal  No respiratory distress  Musculoskeletal:      Comments: Left upper extremity in sling, without motor deficit or sensory deficit   Skin:     General: Skin is warm and dry  Neurological:      Mental Status: She is alert  Comments: Awake, alert and oriented to person place time situation   Psychiatric:         Thought Content: Thought content normal            Lab Results:   Results from last 7 days   Lab Units 03/31/21  0630   WBC Thousand/uL 15 42*   HEMOGLOBIN g/dL 12 5   HEMATOCRIT % 40 5   PLATELETS Thousands/uL 271      Results from last 7 days   Lab Units 04/01/21  0509   POTASSIUM mmol/L 4 9   CHLORIDE mmol/L 108   CO2 mmol/L 29   BUN mg/dL 18   CREATININE mg/dL 0 95   CALCIUM mg/dL 9 2       Imaging Studies: I have personally reviewed pertinent reports  EKG, Pathology, and Other Studies: I have personally reviewed pertinent reports  Please note that the APS provides consultative services regarding pain management only  With the exception of ketamine, peripheral nerve catheters, and epidural infusions (and except when indicated), final decisions regarding starting or changing doses of analgesic medications are at the discretion of the consulting service  Off hours consultation and/or medication management is generally not available      Ines Gomez MD  Acute Pain Service

## 2021-04-01 NOTE — UTILIZATION REVIEW
Elective Surgical Continued Stay Review    Date: 3/30    POD#: 2  Current Patient Class: Inpatient Current Level of Care: Med Surg    Assessment/Plan: 56 y o  female, initial surgery date 3/30 POD #2 Left total shoulder arthroplasty  NBW GABY  Continue pain management  Per Pain Management; Change Tylenol to 975 mg q8h  Start Robaxin q6h scheduled  Start Aqua K-pad to neck/upper neck and continue gabapentin  Awaiting placement  Psychiatric cons; Pt with history of Bipolar Disorder and anxiety  Consulted for physical rehab clearance  Psychiatrically cleared for transfer to physical rehab  Continue current psychiatric medications as ordered     Per Social work notes; Denied by [facility] for acute rehab  Referrals to [facility] - await final determination       Pertinent Labs/Diagnostic Results:     Vital Signs:   04/01/21 15:29:06  98 7 °F (37 1 °C)  100  20  136/73  94  88 %Abnormal   --  --  --   04/01/21 08:09:22  99 5 °F (37 5 °C)  102  17  127/72  90  86 %Abnormal            Medications:   Scheduled Medications:  acetaminophen, 975 mg, Oral, Q8H KIRSTIE  apixaban, 5 mg, Oral, BID  ARIPiprazole, 5 mg, Oral, Daily  cholecalciferol, 5,000 Units, Oral, Daily  docusate sodium, 100 mg, Oral, BID  ferrous sulfate, 325 mg, Oral, Daily With Breakfast  fluvoxaMINE, 300 mg, Oral, HS  gabapentin, 300 mg, Oral, TID  lamoTRIgine, 200 mg, Oral, Daily  LORazepam, 2 mg, Oral, 4x Daily  methocarbamol, 500 mg, Oral, Q6H KIRSTIE  potassium chloride, 40 mEq, Oral, Daily  senna, 1 tablet, Oral, BID      Continuous IV Infusions: None     PRN Meds:  calcium carbonate, 1,000 mg, Oral, Daily PRN  glycerin (adult), 1 suppository, Rectal, Daily PRN  meclizine, 12 5 mg, Oral, Q8H PRN  ondansetron, 4 mg, Intravenous, Q6H PRN  oxyCODONE, 2 5 mg, Oral, Q4H PRN  oxyCODONE, 10 mg, Oral, Q4H PRN 4/1 x2  polyethylene glycol, 17 g, Oral, Daily PRN    Discharge Plan: See above    Network Utilization Review Department  ATTENTION: Please call with any questions or concerns to [phone] and carefully listen to the prompts so that you are directed to the right person  All voicemails are confidential   Jeaneth Teja all requests for admission clinical reviews, approved or denied determinations and any other requests to dedicated fax number below belonging to the campus where the patient is receiving treatment   List of dedicated fax numbers for the Facilities:  [address] DENIALS (Administrative/Medical Necessity) [phone]   [address] (Maternity/NICU/Pediatrics) [phone]   [address] [place] [address] Dr Chao Kincaid [phone] (  Viola Moreno "Madeline" 103) [phone] [address] Shayy Mata 1481 [address] [place]) [address] [place] [address] [phone]

## 2021-04-01 NOTE — PROGRESS NOTES
Javier Rico visited Pt      04/01/21 1300   Clinical Encounter Type   Visited With Patient   Sabianist Encounters   Sabianist Needs Prayer   Sacramental Encounters   Sacrament of Sick-Anointing Anointed

## 2021-04-01 NOTE — PHYSICAL THERAPY NOTE
Physical Therapy Treatment Note     04/01/21 1024   PT Last Visit   PT Visit Date 04/01/21   Note Type   Note Type Treatment   Pain Assessment   Pain Assessment Tool 0-10   Pain Score 3   Pain Location/Orientation Orientation: Left; Location: Shoulder   Restrictions/Precautions   Weight Bearing Precautions Per Order Yes   LUE Weight Bearing Per Order NWB   Braces or Orthoses Other (Comment); Sling  (shoulder abuction sling)   Other Precautions Fall Risk;WBS;Pain; Chair Alarm;Cognitive;Telemetry   General   Chart Reviewed Yes   Family/Caregiver Present No   Subjective   Subjective pt  seated on recliner upon entry  reported she has more pain in the R shoudler jama in the L  Pt  had the sling on  pt  agreeable to PT  Transfers   Sit to Stand 4  Minimal assistance   Additional items Assist x 1; Increased time required;Armrests; Verbal cues   Stand to Sit 4  Minimal assistance   Additional items Assist x 1; Armrests; Increased time required;Verbal cues   Stand pivot 4  Minimal assistance   Additional items Assist x 1; Increased time required;Verbal cues   Ambulation/Elevation   Gait pattern Excessively slow; Ataxia; Short stride; Shuffling;Decreased foot clearance; Improper Weight shift; Foward flexed;Poor UE support  (lateral sways)   Gait Assistance 4  Minimal assist   Additional items Assist x 1   Assistive Device SPC; Other (Comment)  ([facility])   Distance 50ft x 2   Balance   Static Sitting Fair   Ambulatory Poor   Endurance Deficit   Endurance Deficit Yes   Endurance Deficit Description Decreased endurance   Activity Tolerance   Activity Tolerance Patient tolerated treatment well;Patient limited by fatigue   Nurse Made Aware Yes   Assessment   Prognosis Good   Problem List Decreased strength;Decreased range of motion;Decreased endurance; Impaired balance;Decreased mobility; Decreased cognition;Decreased coordination; Impaired judgement;Decreased safety awareness;Pain;Orthopedic restrictions; Obesity   Assessment Trialed ambulation with SPC multiple times after demonstrations and VCs  However patient unsteady ambulating with SPC and not WBing through the gait  HHA given for ambulation however patient continued to be unsteady as she was not WBing through the therapist's hand  Shuffling unsteady gait noted t/o session with B/L knee instability though no knee buckling was noted  pt  will benefit from single handed RW for steadier gait  pt  maintained NWBing of LUE t/o session  pt  needed max cues for feet placment during transfers or while seated on chair as she tend to keep both her feet off the ground  pt  also noted with R lean with sitting and standing/ambulation  negotiated steps with R handrail and CGA  Cues given to palce her entire foot on the step  Will continue to follow octaviot he stay to [facility] functional mobility  pt  is a high risk of fall and needs assist for safe mobility and NWBing of LUE is also a limiting factor  Barriers to Discharge Decreased caregiver support; Inaccessible home environment   Goals   Patient Goals None reported   STG Expiration Date 04/10/21   PT Treatment Day 1   Plan   Treatment/Interventions Functional transfer training;Elevations; Patient/family training;Equipment eval/education;Gait training;Spoke to nursing   Progress Progressing toward goals   PT Frequency Other (Comment)  (3-6x/week)   Recommendation   PT Discharge Recommendation Post-Acute Rehabilitation Services   Equipment Recommended Other (Comment)  (single handed RW)   PT - OK to Discharge Yes         Viridiana Rodríguez PTA

## 2021-04-01 NOTE — PLAN OF CARE
Problem: PHYSICAL THERAPY ADULT  Goal: Performs mobility at highest level of function for planned discharge setting  See evaluation for individualized goals  Description: Treatment/Interventions: Functional transfer training, LE strengthening/ROM, Elevations, Therapeutic exercise, Endurance training, Patient/family training, Equipment eval/education, Bed mobility, Gait training, Spoke to nursing, OT  Equipment Recommended: (Continue to assess)       See flowsheet documentation for full assessment, interventions and recommendations  Outcome: Progressing  Note: Prognosis: Good  Problem List: Decreased strength, Decreased range of motion, Decreased endurance, Impaired balance, Decreased mobility, Decreased cognition, Decreased coordination, Impaired judgement, Decreased safety awareness, Pain, Orthopedic restrictions, Obesity  Assessment: Trialed ambulation with SPC multiple times after demonstrations and VCs  However patient unsteady ambulating with SPC and not WBing through the gait  HHA given for ambulation however patient continued to be unsteady as she was not WBing through the therapist's hand  Shuffling unsteady gait noted t/o session with B/L knee instability though no knee buckling was noted  pt  will benefit from single handed RW for steadier gait  pt  maintained NWBing of LUE t/o session  pt  needed max cues for feet placment during transfers or while seated on chair as she tend to keep both her feet off the ground  pt  also noted with R lean with sitting and standing/ambulation  negotiated steps with R handrail and CGA  Cues given to palce her entire foot on the step  Will continue to follow myrna he stay to [facility] functional mobility  pt  is a high risk of fall and needs assist for safe mobility and NWBing of LUE is also a limiting factor    Barriers to Discharge: Decreased caregiver support, Inaccessible home environment     PT Discharge Recommendation: Post-Acute Rehabilitation Services     PT - OK to Discharge: Yes    See flowsheet documentation for full assessment
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
## 2021-04-01 NOTE — CASE MANAGEMENT
CM was informed by [facility] liaison that pt is not accepted to [facility] at this time  Per [employer], [address] [facility] following but had questions including who will be assisting pt at discharge and if she still receives ketamine infusions  Per pt, she no longer receives ketamine infusions  Pt reports that her daughter and  are able to assist with pt's care upon discharge from SNF  CM informed  TCF of same via [facility], CM to follow for final determination

## 2021-04-01 NOTE — PROGRESS NOTES
Orthopedics   Skylar Foley 56 y o  female MRN: [id number]  Unit/Bed#: APU 1      Subjective:  56 y  o female post operative day 2 left total shoulder arthroplasty  Patient doing well  Pain controlled because she is taking medications regularly  No other acute complaints       Labs:  0   Lab Value Date/Time    HCT 40 5 03/31/2021 0630    HCT 40 1 03/17/2021 1421    HCT 31 1 (L) 07/06/2020 0513    HCT 38 1 07/07/2015 1604    HCT 33 9 (L) 07/03/2015 0537    HCT 33 3 (L) 07/01/2015 0516    HGB 12 5 03/31/2021 0630    HGB 13 1 03/17/2021 1421    HGB 10 0 (L) 07/06/2020 0513    HGB 12 3 07/07/2015 1604    HGB 10 9 (L) 07/03/2015 0537    HGB 10 8 (L) 07/01/2015 0516    INR 1 15 03/17/2021 1421    INR 0 90 05/07/2015 1030    WBC 15 42 (H) 03/31/2021 0630    WBC 9 61 03/17/2021 1421    WBC 10 96 (H) 07/06/2020 0513    WBC 7 66 07/07/2015 1604    WBC 8 47 07/03/2015 0537    WBC 8 02 07/01/2015 0516    ESR 24 (H) 10/05/2019 0548       Meds:    Current Facility-Administered Medications:     acetaminophen (TYLENOL) tablet 650 mg, 650 mg, Oral, Q6H PRN, Alexandrea Guillory, PA-C, 650 mg at 03/31/21 8763    apixaban (ELIQUIS) tablet 5 mg, 5 mg, Oral, BID, Alexandrea Guillory, PA-C, 5 mg at 03/31/21 1723    ARIPiprazole (ABILIFY) tablet 5 mg, 5 mg, Oral, Daily, Alexandrea Guillory, PA-C, 5 mg at 03/31/21 0840    calcium carbonate (TUMS) chewable tablet 1,000 mg, 1,000 mg, Oral, Daily PRN, Alexandrea Guillory PA-C    cholecalciferol (VITAMIN D3) tablet 5,000 Units, 5,000 Units, Oral, Daily, Alexandrea Guillory PA-C, 5,000 Units at 03/31/21 5589    docusate sodium (COLACE) capsule 100 mg, 100 mg, Oral, BID, Alexandrea Guillory PA-C, 100 mg at 03/31/21 1720    ferrous sulfate tablet 325 mg, 325 mg, Oral, Daily With Breakfast, Alexandrea Guillory PA-C, 325 mg at 03/31/21 0839    fluvoxaMINE (LUVOX) tablet 300 mg, 300 mg, Oral, HS, Alexandrea Guillory PA-C, 300 mg at 03/31/21 2212    gabapentin (NEURONTIN) capsule 300 mg, 300 mg, Oral, TID, AVINASH Diaz-C, 300 mg at 03/31/21 2211    lactated ringers infusion, 125 mL/hr, Intravenous, Continuous, Tequila Perquimans, PA-C, Stopped at 03/31/21 8207    lamoTRIgine (LaMICtal) tablet 200 mg, 200 mg, Oral, Daily, Ruffus Perquimans, PA-C, 200 mg at 03/31/21 8407    LORazepam (ATIVAN) tablet 2 mg, 2 mg, Oral, 4x Daily, Ruffus Joby, PA-C, 2 mg at 03/31/21 2211    meclizine (ANTIVERT) tablet 12 5 mg, 12 5 mg, Oral, Q8H PRN, Tequila Perquimans, PA-C    morphine (PF) 10 mg/mL injection 2 mg, 2 mg, Intravenous, Q2H PRN, Tequila Hemphill, PA-C, 2 mg at 03/31/21 0303    ondansetron (ZOFRAN) injection 4 mg, 4 mg, Intravenous, Q6H PRN, Tequila Hemphill, PA-C    oxyCODONE (ROXICODONE) IR tablet 10 mg, 10 mg, Oral, Q4H PRN, Liborious Joby, PA-C, 10 mg at 04/01/21 0551    oxyCODONE (ROXICODONE) IR tablet 5 mg, 5 mg, Oral, Q4H PRN, Tequila Hemphill, PA-C    potassium chloride (K-DUR,KLOR-CON) CR tablet 40 mEq, 40 mEq, Oral, Daily, Ruffus Perquimans, PA-C, 40 mEq at 03/31/21 5175    Blood Culture:   No results found for: BLOODCX    Wound Culture:   No results found for: WOUNDCULT    Ins and Outs:  No intake/output data recorded  Physical:  Vitals:    04/01/21 0305   BP: 131/72   Pulse: 98   Resp: 18   Temp: 99 7 °F (37 6 °C)   SpO2: (!) 77%     left upper extremity  · Dressings clean dry intact  · Sensation intact to axillary, musculocutaneous, radial, ulna, median nerves  · Motor intact to axillary, musculocutaneous, radial, ulna, median nerves  · 2+ Radial pulse    Assessment: 56 y  o female post operative day 1 left total shoulder arthroplasty   Doing well    Plan:  · Nonweight Bearing left upper extremity  · Up and out of bed  · Continue pain management  · APS saw her yesterday  · Discussed with patient that DC would be contingent on pain control off IV pain medications  · Sling for Comfort, may remove for pendulums and hygiene  · DVT prophylaxis  · Ice and analgesics  · Patient noted to have acute blood loss anemia due to a drop in Hbg of > 2 0g from preop levels, will monitor vital signs and resuscitate with IV fluids as needed   · DC planning  · Discussed home vs Rehab      Nguyen Randall PA-C

## 2021-04-01 NOTE — CASE MANAGEMENT
Patient reviewed during care coordination rounds with Ortho resident Onita Going who informed that pt is medically stable for discharge  IV morphine being discontinued  ARC reviewing, requested psych consult  Consult placed  CM to follow for final ARC determination

## 2021-04-02 LAB
ANION GAP SERPL CALCULATED.3IONS-SCNC: 1 MMOL/L (ref 4–13)
BASOPHILS # BLD AUTO: 0.05 THOUSANDS/ΜL (ref 0–0.1)
BASOPHILS NFR BLD AUTO: 0 % (ref 0–1)
BUN SERPL-MCNC: 13 MG/DL (ref 5–25)
CALCIUM SERPL-MCNC: 9.2 MG/DL (ref 8.3–10.1)
CHLORIDE SERPL-SCNC: 109 MMOL/L (ref 100–108)
CO2 SERPL-SCNC: 30 MMOL/L (ref 21–32)
CREAT SERPL-MCNC: 0.78 MG/DL (ref 0.6–1.3)
EOSINOPHIL # BLD AUTO: 0.16 THOUSAND/ΜL (ref 0–0.61)
EOSINOPHIL NFR BLD AUTO: 1 % (ref 0–6)
ERYTHROCYTE [DISTWIDTH] IN BLOOD BY AUTOMATED COUNT: 16.4 % (ref 11.6–15.1)
GFR SERPL CREATININE-BSD FRML MDRD: 82 ML/MIN/1.73SQ M
GLUCOSE SERPL-MCNC: 85 MG/DL (ref 65–140)
HCT VFR BLD AUTO: 37.5 % (ref 34.8–46.1)
HGB BLD-MCNC: 11.9 G/DL (ref 11.5–15.4)
IMM GRANULOCYTES # BLD AUTO: 0.1 THOUSAND/UL (ref 0–0.2)
IMM GRANULOCYTES NFR BLD AUTO: 1 % (ref 0–2)
LYMPHOCYTES # BLD AUTO: 2.26 THOUSANDS/ΜL (ref 0.6–4.47)
LYMPHOCYTES NFR BLD AUTO: 18 % (ref 14–44)
MCH RBC QN AUTO: 30.4 PG (ref 26.8–34.3)
MCHC RBC AUTO-ENTMCNC: 31.7 G/DL (ref 31.4–37.4)
MCV RBC AUTO: 96 FL (ref 82–98)
MONOCYTES # BLD AUTO: 1.61 THOUSAND/ΜL (ref 0.17–1.22)
MONOCYTES NFR BLD AUTO: 13 % (ref 4–12)
NEUTROPHILS # BLD AUTO: 8.63 THOUSANDS/ΜL (ref 1.85–7.62)
NEUTS SEG NFR BLD AUTO: 67 % (ref 43–75)
NRBC BLD AUTO-RTO: 0 /100 WBCS
PLATELET # BLD AUTO: 299 THOUSANDS/UL (ref 149–390)
PMV BLD AUTO: 10 FL (ref 8.9–12.7)
POTASSIUM SERPL-SCNC: 4.4 MMOL/L (ref 3.5–5.3)
RBC # BLD AUTO: 3.92 MILLION/UL (ref 3.81–5.12)
SODIUM SERPL-SCNC: 140 MMOL/L (ref 136–145)
WBC # BLD AUTO: 12.81 THOUSAND/UL (ref 4.31–10.16)

## 2021-04-02 PROCEDURE — 99024 POSTOP FOLLOW-UP VISIT: CPT | Performed by: PHYSICIAN ASSISTANT

## 2021-04-02 PROCEDURE — 87493 C DIFF AMPLIFIED PROBE: CPT | Performed by: INTERNAL MEDICINE

## 2021-04-02 PROCEDURE — 97116 GAIT TRAINING THERAPY: CPT

## 2021-04-02 PROCEDURE — 80048 BASIC METABOLIC PNL TOTAL CA: CPT | Performed by: PHYSICIAN ASSISTANT

## 2021-04-02 PROCEDURE — 85025 COMPLETE CBC W/AUTO DIFF WBC: CPT | Performed by: ORTHOPAEDIC SURGERY

## 2021-04-02 PROCEDURE — 97530 THERAPEUTIC ACTIVITIES: CPT

## 2021-04-02 RX ADMIN — LAMOTRIGINE 200 MG: 100 TABLET ORAL at 08:46

## 2021-04-02 RX ADMIN — FLUVOXAMINE MALEATE 300 MG: 50 TABLET ORAL at 22:51

## 2021-04-02 RX ADMIN — LORAZEPAM 2 MG: 1 TABLET ORAL at 17:17

## 2021-04-02 RX ADMIN — GABAPENTIN 300 MG: 300 CAPSULE ORAL at 15:55

## 2021-04-02 RX ADMIN — GABAPENTIN 300 MG: 300 CAPSULE ORAL at 22:50

## 2021-04-02 RX ADMIN — LORAZEPAM 2 MG: 1 TABLET ORAL at 08:46

## 2021-04-02 RX ADMIN — ACETAMINOPHEN 975 MG: 325 TABLET, FILM COATED ORAL at 05:54

## 2021-04-02 RX ADMIN — OXYCODONE HYDROCHLORIDE 5 MG: 5 TABLET ORAL at 19:54

## 2021-04-02 RX ADMIN — ARIPIPRAZOLE 5 MG: 5 TABLET ORAL at 08:49

## 2021-04-02 RX ADMIN — Medication 5000 UNITS: at 08:45

## 2021-04-02 RX ADMIN — MECLIZINE HCL 12.5 MG 12.5 MG: 12.5 TABLET ORAL at 08:46

## 2021-04-02 RX ADMIN — METHOCARBAMOL 500 MG: 500 TABLET, FILM COATED ORAL at 05:54

## 2021-04-02 RX ADMIN — FERROUS SULFATE TAB 325 MG (65 MG ELEMENTAL FE) 325 MG: 325 (65 FE) TAB at 08:46

## 2021-04-02 RX ADMIN — METHOCARBAMOL 500 MG: 500 TABLET, FILM COATED ORAL at 22:50

## 2021-04-02 RX ADMIN — ACETAMINOPHEN 975 MG: 325 TABLET, FILM COATED ORAL at 22:50

## 2021-04-02 RX ADMIN — METHOCARBAMOL 500 MG: 500 TABLET, FILM COATED ORAL at 17:17

## 2021-04-02 RX ADMIN — APIXABAN 5 MG: 5 TABLET, FILM COATED ORAL at 17:17

## 2021-04-02 RX ADMIN — DOCUSATE SODIUM 100 MG: 100 CAPSULE, LIQUID FILLED ORAL at 08:46

## 2021-04-02 RX ADMIN — LORAZEPAM 2 MG: 1 TABLET ORAL at 22:50

## 2021-04-02 RX ADMIN — METHOCARBAMOL 500 MG: 500 TABLET, FILM COATED ORAL at 00:52

## 2021-04-02 RX ADMIN — METHOCARBAMOL 500 MG: 500 TABLET, FILM COATED ORAL at 11:47

## 2021-04-02 RX ADMIN — POTASSIUM CHLORIDE 40 MEQ: 1500 TABLET, EXTENDED RELEASE ORAL at 08:46

## 2021-04-02 RX ADMIN — NYSTATIN 500000 UNITS: 100000 SUSPENSION ORAL at 22:50

## 2021-04-02 RX ADMIN — GABAPENTIN 300 MG: 300 CAPSULE ORAL at 08:46

## 2021-04-02 RX ADMIN — APIXABAN 5 MG: 5 TABLET, FILM COATED ORAL at 08:46

## 2021-04-02 RX ADMIN — SENNOSIDES 8.6 MG: 8.6 TABLET, FILM COATED ORAL at 08:47

## 2021-04-02 NOTE — UTILIZATION REVIEW
Continued Stay Review    Date: 4/2/2021                    Current Patient Class: IP  Current Level of Care: Med/Surg  HPI:60 y o  female initially admitted on 3/30 for Left TSA  Assessment/Plan: POD #3 left TSA  Continue NWB to LUE  Sling for comfort, may remove for pendulums and hygiene  Pain control  OOB as yamileth  Reg diet  PT/OT recommending IP rehab on d/c  Cm working on finding available bed  [facility] concerned re pt's low O2 sat, physician stating low sats are erroneous readings  Pt cleared for d/c to rehab per medical and ortho  Awaiting ins auth      Pertinent Labs/Diagnostic Results:     Results from last 7 days   Lab Units 04/02/21  1055 03/31/21  0630   WBC Thousand/uL 12 81* 15 42*   HEMOGLOBIN g/dL 11 9 12 5   HEMATOCRIT % 37 5 40 5   PLATELETS Thousands/uL 299 271   NEUTROS ABS Thousands/µL 8 63*  --      Results from last 7 days   Lab Units 04/02/21  0552 04/01/21  0509 03/31/21  0507   SODIUM mmol/L 140 140 139   POTASSIUM mmol/L 4 4 4 9 4 7   CHLORIDE mmol/L 109* 108 112*   CO2 mmol/L 30 29 21   ANION GAP mmol/L 1* 3* 6   BUN mg/dL 13 18 16   CREATININE mg/dL 0 78 0 95 1 10   EGFR ml/min/1 73sq m 82 64 54   CALCIUM mg/dL 9 2 9 2 9 2     Results from last 7 days   Lab Units 04/02/21  0552 04/01/21  0509 03/31/21  0507   GLUCOSE RANDOM mg/dL 85 82 108       Vital Signs:   Date/Time  Temp  Pulse  Resp  BP  MAP (mmHg)  SpO2   04/02/21 15:21:28  99 °F (37 2 °C)  93  --  146/82  103  93 %   04/02/21 0851  --  97  --  --  --  93 %   04/02/21 0850  --  98  --  --  --  94 %   04/02/21 0849  --  96  --  --  --  93 %   04/02/21 0848  --  98  --  --  --  95 %   04/02/21 0847  --  97  --  --  --  92 %   04/02/21 0846  --  98  --  --  --  92 %   04/02/21 0836  --  104  --  --  --  94 %   04/02/21 07:31:25  98 1 °F (36 7 °C)  98  18  146/83  104  91 %   04/01/21 2333  99 3 °F (37 4 °C)  98  --  116/62  80  88 %Abnormal    04/01/21 15:29:06  98 7 °F (37 1 °C)  100  20  136/73  94  88 %Abnormal    04/01/21 08:09:22 99 5 °F (37 5 °C)  102  17  127/72  90  86 %Abnormal    04/01/21 03:05:09  99 7 °F (37 6 °C)  98  18  131/72  92  77 %Abnormal    03/31/21 23:06:18  98 2 °F (36 8 °C)  --  18  117/62  80  --   03/31/21 18:58:59  98 2 °F (36 8 °C)  95  18  116/65  82  92 %   03/31/21 15:09:47  98 °F (36 7 °C)  87  20  126/76  93  90 %   03/31/21 08:08:51  98 4 °F (36 9 °C)  94  18  131/79  96  88 %Abnormal    03/31/21 03:13:29  98 1 °F (36 7 °C)  88  18  133/78  96  89 %Abnormal    03/31/21 00:26:40  98 °F (36 7 °C)  87  18  132/78  96  88 %Abnormal        Medications:   Scheduled Medications:  acetaminophen, 975 mg, Oral, Q8H KIRSTIE  apixaban, 5 mg, Oral, BID  ARIPiprazole, 5 mg, Oral, Daily  cholecalciferol, 5,000 Units, Oral, Daily  ferrous sulfate, 325 mg, Oral, Daily With Breakfast  fluvoxaMINE, 300 mg, Oral, HS  gabapentin, 300 mg, Oral, TID  lamoTRIgine, 200 mg, Oral, Daily  LORazepam, 2 mg, Oral, 4x Daily  methocarbamol, 500 mg, Oral, Q6H KIRSTIE  potassium chloride, 40 mEq, Oral, Daily         PRN Meds:  calcium carbonate, 1,000 mg, Oral, Daily PRN  meclizine, 12 5 mg, Oral, Q8H PRN 4/2 x1  ondansetron, 4 mg, Intravenous, Q6H PRN  oxyCODONE, 2 5 mg, Oral, Q4H PRN  oxyCODONE, 5 mg, Oral, Q4H PRN  Oxycodone 10 mg po 4/1 x2 then d/c'd        Discharge Plan: D    Network Utilization Review Department  ATTENTION: Please call with any questions or concerns to [phone] and carefully listen to the prompts so that you are directed to the right person  All voicemails are confidential   Adama Moreno all requests for admission clinical reviews, approved or denied determinations and any other requests to dedicated fax number below belonging to the campus where the patient is receiving treatment   List of dedicated fax numbers for the Facilities:  [address] DENIALS (Administrative/Medical Necessity) [phone]   [address] [place] [address] (Maternity/NICU/Pediatrics) [address] [place] [address],[address] [address] [facility] [address] ([place]) [address] [place] [address] Shayy Mata [facility] [address] [place] [address] [phone]

## 2021-04-02 NOTE — PLAN OF CARE
Problem: PHYSICAL THERAPY ADULT  Goal: Performs mobility at highest level of function for planned discharge setting  See evaluation for individualized goals  Description: Treatment/Interventions: Functional transfer training, LE strengthening/ROM, Elevations, Therapeutic exercise, Endurance training, Patient/family training, Equipment eval/education, Bed mobility, Gait training, Spoke to nursing, OT  Equipment Recommended: (Continue to assess)       See flowsheet documentation for full assessment, interventions and recommendations  Outcome: Progressing  Note: Prognosis: Good  Problem List: Decreased strength, Decreased range of motion, Decreased endurance, Impaired balance, Decreased mobility, Decreased coordination, Decreased cognition, Impaired judgement, Decreased safety awareness, Orthopedic restrictions, Pain  Assessment: Pt  needed repepated max cues for longer strides and to increased foot claerance and heel strike during ambulation with single handed RW  However patient unable to follow cues  Pt  noted to stay closer to the L side of the RW and her foot was bumping into the RW multiple times  Also patient unable to avoid bumping into obstacles in her way even with cues during ambulation  Decreased planning and execution noted for safe mobility  However improved transfers and decreased lean to the R while sitting noted this session  Pt  continues to exhibit unsafe and unsteady gait and needs assist for safe mobility  NWBing status of GABY is also limiting her mobility with impaired balance  Poor RUE support noted while using AD or even with HHA  Will continue to follow during the stay to maximize funcional mobility and improve LE strength, balance  and endurance  Barriers to Discharge: Inaccessible home environment, Decreased caregiver support     PT Discharge Recommendation: [address]     PT - OK to Discharge: Yes    See flowsheet documentation for full assessment

## 2021-04-02 NOTE — CASE MANAGEMENT
Per [employer], [address] [place] [address] reports pt is physically appropriate with concern about pt's temp and WBC  Pt had a temp of 99 3 4/1 and slightly elevated WBC  CM spoke with Sherman Grimes from [address] [place] [address] who requested updated labs to see current WBC  Pt's spo2 noted to have dropped to the 70's however physician reports that it was not an accurate reading, Sherman Grimes asked for that to be documented  Dr Shweta Rubin note from today states that pulse ox was without good tracing, updated readings to be uploaded shortly  Sherman Grimes reported that, because temp has improved and WBC will be collected, CM can submit for insurance auth for hopeful weekend discharge to [address] [place] [address]  Awaiting facility and accepting doc NPI  CM to follow

## 2021-04-02 NOTE — QUICK NOTE
Notified by nursing staff pt with several watery stools this AM  Will DC all bowel meds and as she has been in hospital will check stool for Cdiff toxin    Will place on contact precautions until test

## 2021-04-02 NOTE — CASE MANAGEMENT
Pt now rule-out C-Diff, [facility] following for results  Pt will require insurance auth for placement once medically stable

## 2021-04-02 NOTE — PROGRESS NOTES
Internal Medicine Progress Note  Patient: Mela Duncan  Age/sex: 56 y o  female  Medical Record #: [id number]      ASSESSMENT/PLAN: (Interval History)  Mela Duncan is seen and examined and management for following issues:    Status Post left Total SHOULDER ARTHROPLASTY   Pain somewhat controlled   Continue encourage incentive spirometry; monitor fever curve   DVT prophylaxis in place and reviewed  Results from last 7 days   Lab Units 03/31/21  0630   WBC Thousand/uL 15 42*   HEMOGLOBIN g/dL 12 5   HEMATOCRIT % 40 5   PLATELETS Thousands/uL 271         Operative acute blood loss anemia  · Decrease in hgb levels from preop labs results; suspect due to post operation extravasation losses along with potential dilutional effects of fluids  · Monitor follow up CBC post intervention to trend effect    H/o DVT/PE  · Takes eliquis 5mg bid  · Resumed 3/31     Bipolar/depression/anxiety  · Takes multiple medications including high dose ativan 2mg 4x daily  · Continue home regimen as ordered without interruption  · Psychiatry input appreciated recommend no changes in current meds including ativan     Chronic migraines  · F/b [facility] headache clinic and neurology  · Cont current regimen     Hyperlipidemia  · Low cholesterol diet  · Continue statin therapy    Hypoxia  · Pulse oximetry without good tracing  · Encourage incentive spirometry  · Monitor respiratory status     Andrés's disease  · No documentation regarding chronic steroids  · Verified patient does not take chronic steroids     Hypotension  · Has taken midodrine in the past  · Does not currently use     Hypokalemia  · Continue supplementation    Chronic dizziness/frequent falls  · No further episodes  · Awaiting placement     H/o gastric sleeve  · Cont PPI      OK for dc to rehab from medicine standpoint     PRE-OP HGB LEVEL: 13 1  The above assessment and plan was reviewed and updated as determined by my evaluation of the patient on 4/2/2021  Labs:   Results from last 7 days   Lab Units 03/31/21  0630   WBC Thousand/uL 15 42*   HEMOGLOBIN g/dL 12 5   HEMATOCRIT % 40 5   PLATELETS Thousands/uL 271     Results from last 7 days   Lab Units 04/02/21  0552 04/01/21  0509   SODIUM mmol/L 140 140   POTASSIUM mmol/L 4 4 4 9   CHLORIDE mmol/L 109* 108   CO2 mmol/L 30 29   BUN mg/dL 13 18   CREATININE mg/dL 0 78 0 95   CALCIUM mg/dL 9 2 9 2             Results from last 7 days   Lab Units 03/30/21  1153   POC GLUCOSE mg/dl 101       Review of Scheduled Meds:  Current Facility-Administered Medications   Medication Dose Route Frequency Provider Last Rate    acetaminophen  975 mg Oral [facility] Marlon Hankins MD      apixaban  5 mg Oral BID Alyssa Es, SIRISHA      ARIPiprazole  5 mg Oral Daily Alyssa Es, PA-C      calcium carbonate  1,000 mg Oral Daily PRN Alyssa Suggs, PA-C      cholecalciferol  5,000 Units Oral Daily Alyssa Es, PA-C      docusate sodium  100 mg Oral BID Alyssa Es, PA-C      ferrous sulfate  325 mg Oral Daily With Breakfast Alyssa Suggs, PA-YO      fluvoxaMINE  300 mg Oral HS Alyssa Es, PA-C      gabapentin  300 mg Oral TID Alyssa Suggs, SIRISHA      glycerin (adult)  1 suppository Rectal Daily PRN Marlon Hankins MD      lamoTRIgine  200 mg Oral Daily Alyssa Es, PA-YO      LORazepam  2 mg Oral 4x Daily Alyssa Suggs, PA-YO      meclizine  12 5 mg Oral Q8H PRN Alyssa Suggs, SIRISHA      methocarbamol  500 mg Oral Q6H [address] Marlon Hankins MD      ondansetron  4 mg Intravenous Q6H PRN Alyssa Suggs PA-C      oxyCODONE  2 5 mg Oral Q4H PRN Marlon Hankins MD      oxyCODONE  5 mg Oral Q4H PRN Marlon Hankins MD      polyethylene glycol  17 g Oral Daily PRN Marlon Hankins MD      potassium chloride  40 mEq Oral Daily Alyssa Suggs, SIRISHA      senna  1 tablet Oral BID Marlon Hankins MD         Subjective/ HPI: Chrissie Acosta seen and examined   Patient's overnight issues or events were reviewed with nursing or staff during rounds or morning huddle session  New or overnight issues include the following:     Pt awaiting placement at rehab; overall mild improvement      ROS:   A 10 point ROS was performed; negative except as noted above  Imaging:     XR shoulder 2+ vw left   Final Result by Marianne Lawson MD (04/01 1449)      Unremarkable appearance of the left shoulder, status post joint replacement  Workstation performed: [id number]             *Labs /Radiology studies Reviewed  *Medications  reviewed and reconciled as needed  *Please refer to order section for additional ordered labs studies  *Case discussed with primary attending during morning huddle case rounds    Physical Examination:  Vitals:   Vitals:    04/01/21 0809 04/01/21 1529 04/01/21 2333 04/02/21 0731   BP: 127/72 136/73 116/62 146/83   Pulse: 102 100 98 98   Resp: 17 20  18   Temp: 99 5 °F (37 5 °C) 98 7 °F (37 1 °C) 99 3 °F (37 4 °C) 98 1 °F (36 7 °C)   TempSrc:       SpO2: (!) 86% (!) 88% (!) 88% 91%   Weight:       Height:           GEN: No apparent distress, interactive; more awake this a m  than yesterday  NEURO: Alert and oriented x3  HEENT: Pupils are equal and reactive, EOMI, mucous membranes are moist, face symmetrical  CV: S1 S2 regular, no MRG, no peripheral edema noted  RESP: Lungs are clear bilaterally, no wheezes, rales or rhonchi noted, on room air, respirations easy and non labored  GI: Flat, soft non tender, non distended; +BS x4  : Voiding without difficulty  MUSC: Moves all extremities; except LUE immobilizer in place  SKIN: pink, warm and dry, normal turgor, no rashes, lesions      The above physical exam was reviewed and updated as determined by my evaluation of the patient on 4/2/2021      Invasive Devices     Peripheral Intravenous Line            Peripheral IV 03/30/21 Right Hand 2 days          Drain            External Urinary Catheter less than 1 day                   VTE Pharmacologic Prophylaxis: Sequential pneumatic compression stocking  Code Status: Level 1 - Full Code  Current Length of Stay: 3 day(s)      Total time spent:  30 minutes with more than 50% spent counseling/coordinating care  Counseling includes discussion with patient re: progress  and discussion with patient of his/her current medical state/information  Coordination of patient's care was performed in conjunction with primary service  Time invested included review of patient's labs, vitals, and management of their comorbidities with continued monitoring  In addition, this patient was discussed with medical team including physician and advanced extenders  The care of the patient was extensively discussed and appropriate treatment plan was formulated unique for this patient  ** Please Note:  voice to text software may have been used in the creation of this document   Although proof errors in transcription or interpretation are a potential of such software**

## 2021-04-02 NOTE — PHYSICAL THERAPY NOTE
Physical Therapy Treatment Note     04/02/21 0910   PT Last Visit   PT Visit Date 04/02/21   Note Type   Note Type Treatment   Pain Assessment   Pain Assessment Tool 0-10   Pain Score 3   Pain Location/Orientation Orientation: Left; Location: Shoulder   Restrictions/Precautions   Weight Bearing Precautions Per Order Yes   LUE Weight Bearing Per Order NWB   Braces or Orthoses Other (Comment)  (Abd  sling on LUE)   Other Precautions WBS; Fall Risk;Pain;Telemetry;Cognitive   General   Chart Reviewed Yes   Family/Caregiver Present No   Cognition   Overall Cognitive Status WFL   Subjective   Subjective pt  agreeable to PT  pt  reported very minimal pain/soreness in her LUE  Pt  seated on recliner upon entry   Transfers   Sit to Stand 4  Minimal assistance   Additional items Assist x 1; Increased time required;Verbal cues;Armrests   Stand to Sit 4  Minimal assistance   Additional items Assist x 1; Armrests; Increased time required;Verbal cues   Stand pivot 4  Minimal assistance   Additional items Assist x 1; Increased time required  (with single handed RW)   Ambulation/Elevation   Gait pattern Shuffling;Decreased foot clearance; Wide ZEESHAN; Forward Flexion; Improper Weight shift; Poor UE support; Excessively slow; Ataxia; Short stride; Inconsistent gil   Gait Assistance 4  Minimal assist   Additional items Assist x 1;Verbal cues   Assistive Device Other (Comment);1 handed walker  (R handed RW)   Distance 60ft x 2   Stair Management Assistance 4  Minimal assist   Additional items Assist x 1;Verbal cues; Increased time required   Stair Management Technique One rail R;Step to pattern; Foreward;Nonreciprocal   Number of Stairs 5   Balance   Static Sitting Fair   Ambulatory Poor   Endurance Deficit   Endurance Deficit Yes   Endurance Deficit Description Gross deconditioning, LE instability   Activity Tolerance   Activity Tolerance Patient tolerated treatment well;Patient limited by fatigue   Nurse Made Aware Yes   Assessment   Prognosis Good   Problem List Decreased strength;Decreased range of motion;Decreased endurance; Impaired balance;Decreased mobility; Decreased coordination;Decreased cognition; Impaired judgement;Decreased safety awareness;Orthopedic restrictions;Pain   Assessment Pt  needed repepated max cues for longer strides and to increased foot claerance and heel strike during ambulation with single handed RW  However patient unable to follow cues  Pt  noted to stay closer to the L side of the RW and her foot was bumping into the RW multiple times  Also patient unable to avoid bumping into obstacles in her way even with cues during ambulation  Decreased planning and execution noted for safe mobility  However improved transfers and decreased lean to the R while sitting noted this session  Pt  continues to exhibit unsafe and unsteady gait and needs assist for safe mobility  NWBing status of GABY is also limiting her mobility with impaired balance  Poor RUE support noted while using AD or even with HHA  Will continue to follow during the stay to maximize funcional mobility and improve LE strength, balance  and endurance   Barriers to Discharge Inaccessible home environment;Decreased caregiver support   Goals   Patient Goals None reported   STG Expiration Date 04/10/21   PT Treatment Day 2   Plan   Treatment/Interventions Functional transfer training;Elevations; Patient/family training;Gait training;Equipment eval/education;Spoke to nursing   Progress Progressing toward goals   PT Frequency Other (Comment)  (3-6x/week)   Recommendation   PT Discharge Recommendation Post-Acute Rehabilitation Services   Equipment Recommended Other (Comment)  (Single handed RW)   PT - OK to Discharge Yes         Rashaad Perez PTA

## 2021-04-02 NOTE — PROGRESS NOTES
Progress Note - Orthopedics   Jose David Bran 56 y o  female MRN: [id number]  Unit/Bed#: -01      Subjective:    56 y  o female POD #3 Left TSA  No acute events, no complaints  Pt doing well  Denies other consitutional complaints      Labs:  0   Lab Value Date/Time    HCT 40 5 03/31/2021 0630    HCT 40 1 03/17/2021 1421    HCT 31 1 (L) 07/06/2020 0513    HCT 38 1 07/07/2015 1604    HCT 33 9 (L) 07/03/2015 0537    HCT 33 3 (L) 07/01/2015 0516    HGB 12 5 03/31/2021 0630    HGB 13 1 03/17/2021 1421    HGB 10 0 (L) 07/06/2020 0513    HGB 12 3 07/07/2015 1604    HGB 10 9 (L) 07/03/2015 0537    HGB 10 8 (L) 07/01/2015 0516    INR 1 15 03/17/2021 1421    INR 0 90 05/07/2015 1030    WBC 15 42 (H) 03/31/2021 0630    WBC 9 61 03/17/2021 1421    WBC 10 96 (H) 07/06/2020 0513    WBC 7 66 07/07/2015 1604    WBC 8 47 07/03/2015 0537    WBC 8 02 07/01/2015 0516    ESR 24 (H) 10/05/2019 0548       Meds:    Current Facility-Administered Medications:     acetaminophen (TYLENOL) tablet 975 mg, 975 mg, Oral, Q8H [address], Sarah Stark MD, 975 mg at 04/02/21 0554    apixaban (ELIQUIS) tablet 5 mg, 5 mg, Oral, BID, Alyson Cline PA-C, 5 mg at 04/01/21 1708    ARIPiprazole (ABILIFY) tablet 5 mg, 5 mg, Oral, Daily, Alyson Cline PA-C, 5 mg at 04/01/21 0038    calcium carbonate (TUMS) chewable tablet 1,000 mg, 1,000 mg, Oral, Daily PRN, Alyson Cline PA-C    cholecalciferol (VITAMIN D3) tablet 5,000 Units, 5,000 Units, Oral, Daily, Alyson Cline PA-C, 5,000 Units at 04/01/21 4439    docusate sodium (COLACE) capsule 100 mg, 100 mg, Oral, BID, AVINASH Cordero-C, 100 mg at 04/01/21 1709    ferrous sulfate tablet 325 mg, 325 mg, Oral, Daily With Breakfast, AVINASH Cordero-C, 325 mg at 04/01/21 0917    fluvoxaMINE (LUVOX) tablet 300 mg, 300 mg, Oral, HS, AVINASH Cordero-C, 300 mg at 04/01/21 2212    gabapentin (NEURONTIN) capsule 300 mg, 300 mg, Oral, TID, AVINASH Cordero-YO, 300 mg at 04/01/21 2210    glycerin (adult) rectal suppository 1 suppository, 1 suppository, Rectal, Daily PRN, Amy Khalil MD    HYDROmorphone (DILAUDID) injection 0 5 mg, 0 5 mg, Intravenous, Q4H PRN, Amy Khalil MD    lamoTRIgine (LaMICtal) tablet 200 mg, 200 mg, Oral, Daily, Ketan Coreas PA-C, 200 mg at 04/01/21 5606    LORazepam (ATIVAN) tablet 2 mg, 2 mg, Oral, 4x Daily, Ketan Coreas PA-C, 2 mg at 04/01/21 2211    meclizine (ANTIVERT) tablet 12 5 mg, 12 5 mg, Oral, Q8H PRN, Ketan Coreas PA-C    methocarbamol (ROBAXIN) tablet 500 mg, 500 mg, Oral, Q6H [facility], Amy Khalil MD, 500 mg at 04/02/21 0554    ondansetron (ZOFRAN) injection 4 mg, 4 mg, Intravenous, Q6H PRN, Ketan Coreas PA-C    oxyCODONE (ROXICODONE) IR tablet 2 5 mg, 2 5 mg, Oral, Q4H PRN, Amy Khalil MD    oxyCODONE (ROXICODONE) IR tablet 5 mg, 5 mg, Oral, Q4H PRN, Amy Khalil MD    polyethylene glycol [facility]) packet 17 g, 17 g, Oral, Daily PRN, Amy Khalil MD    potassium chloride (K-DUR,KLOR-CON) CR tablet 40 mEq, 40 mEq, Oral, Daily, Ketan Coreas PA-C, 40 mEq at 04/01/21 5507    senna (SENOKOT) tablet 8 6 mg, 1 tablet, Oral, BID, Amy Khalil MD, 8 6 mg at 04/01/21 1709    Blood Culture:   No results found for: BLOODCX    Wound Culture:   No results found for: WOUNDCULT    Ins and Outs:  No intake/output data recorded  Physical:  Vitals:    04/01/21 2333   BP: 116/62   Pulse: 98   Resp:    Temp: 99 3 °F (37 4 °C)   SpO2: (!) 88%     Musculoskeletal: left Upper Extremity  · Dressing c/d/i  · Arm is soft and compressible  · SILT m/r/u  · Motor intact ain/pin/m/r/u  · 2+ rad pulse    _*_*_*_*_*_*_*_*_*_*_*_*_*_*_*_*_*_*_*_*_*_*_*_*_*_*_*_*_*_*_*_*_*_*_*_*_*_*_*_*_*    Assessment:    56 y  o female POD #3 Left TSA      Plan:  · NWB LUE  · Up and out of bed  · Sling for comfort, may remove for pendulums and hygiene  · Pain control  · DVT ppx - mechanical  · Patient noted to have acute blood loss anemia due to a drop in Hbg of > 2 0g from preop levels, will monitor vital signs and resuscitate with IV fluids as needed  · Dispo: Ortho will follow, pending AR[facility] placement    Oliver Wallace PA-C

## 2021-04-03 LAB — C DIFF TOX B TCDB STL QL NAA+PROBE: NEGATIVE

## 2021-04-03 PROCEDURE — 99024 POSTOP FOLLOW-UP VISIT: CPT | Performed by: PHYSICIAN ASSISTANT

## 2021-04-03 RX ORDER — LOPERAMIDE HYDROCHLORIDE 2 MG/1
2 CAPSULE ORAL 3 TIMES DAILY PRN
Status: DISCONTINUED | OUTPATIENT
Start: 2021-04-03 | End: 2021-04-06 | Stop reason: HOSPADM

## 2021-04-03 RX ADMIN — LORAZEPAM 2 MG: 1 TABLET ORAL at 08:30

## 2021-04-03 RX ADMIN — LORAZEPAM 2 MG: 1 TABLET ORAL at 18:36

## 2021-04-03 RX ADMIN — ACETAMINOPHEN 975 MG: 325 TABLET, FILM COATED ORAL at 14:15

## 2021-04-03 RX ADMIN — METHOCARBAMOL 500 MG: 500 TABLET, FILM COATED ORAL at 17:12

## 2021-04-03 RX ADMIN — FERROUS SULFATE TAB 325 MG (65 MG ELEMENTAL FE) 325 MG: 325 (65 FE) TAB at 08:30

## 2021-04-03 RX ADMIN — GABAPENTIN 300 MG: 300 CAPSULE ORAL at 08:30

## 2021-04-03 RX ADMIN — METHOCARBAMOL 500 MG: 500 TABLET, FILM COATED ORAL at 12:09

## 2021-04-03 RX ADMIN — Medication 5000 UNITS: at 08:30

## 2021-04-03 RX ADMIN — GABAPENTIN 300 MG: 300 CAPSULE ORAL at 22:53

## 2021-04-03 RX ADMIN — GABAPENTIN 300 MG: 300 CAPSULE ORAL at 17:11

## 2021-04-03 RX ADMIN — APIXABAN 5 MG: 5 TABLET, FILM COATED ORAL at 08:30

## 2021-04-03 RX ADMIN — NYSTATIN 500000 UNITS: 100000 SUSPENSION ORAL at 08:31

## 2021-04-03 RX ADMIN — METHOCARBAMOL 500 MG: 500 TABLET, FILM COATED ORAL at 22:52

## 2021-04-03 RX ADMIN — LORAZEPAM 2 MG: 1 TABLET ORAL at 22:52

## 2021-04-03 RX ADMIN — METHOCARBAMOL 500 MG: 500 TABLET, FILM COATED ORAL at 05:24

## 2021-04-03 RX ADMIN — OXYCODONE HYDROCHLORIDE 5 MG: 5 TABLET ORAL at 17:16

## 2021-04-03 RX ADMIN — ACETAMINOPHEN 975 MG: 325 TABLET, FILM COATED ORAL at 05:24

## 2021-04-03 RX ADMIN — NYSTATIN 500000 UNITS: 100000 SUSPENSION ORAL at 22:53

## 2021-04-03 RX ADMIN — FLUVOXAMINE MALEATE 300 MG: 50 TABLET ORAL at 22:54

## 2021-04-03 RX ADMIN — POTASSIUM CHLORIDE 40 MEQ: 1500 TABLET, EXTENDED RELEASE ORAL at 08:30

## 2021-04-03 RX ADMIN — ARIPIPRAZOLE 5 MG: 5 TABLET ORAL at 08:32

## 2021-04-03 RX ADMIN — NYSTATIN 500000 UNITS: 100000 SUSPENSION ORAL at 17:12

## 2021-04-03 RX ADMIN — LORAZEPAM 2 MG: 1 TABLET ORAL at 12:09

## 2021-04-03 RX ADMIN — ACETAMINOPHEN 975 MG: 325 TABLET, FILM COATED ORAL at 22:52

## 2021-04-03 RX ADMIN — LAMOTRIGINE 200 MG: 100 TABLET ORAL at 08:30

## 2021-04-03 RX ADMIN — NYSTATIN 500000 UNITS: 100000 SUSPENSION ORAL at 12:09

## 2021-04-03 RX ADMIN — APIXABAN 5 MG: 5 TABLET, FILM COATED ORAL at 17:11

## 2021-04-03 NOTE — PROGRESS NOTES
Internal Medicine Progress Note  Patient: Kenney Mckeon  Age/sex: 56 y o  female  Medical Record #: [id number]      ASSESSMENT/PLAN: (Interval History)  Kenney Mckeon is seen and examined and management for following issues:    Status Post left Total SHOULDER ARTHROPLASTY   Pain somewhat controlled   Continue encourage incentive spirometry; monitor fever curve   DVT prophylaxis in place and reviewed  Results from last 7 days   Lab Units 04/02/21  1055   WBC Thousand/uL 12 81*   HEMOGLOBIN g/dL 11 9   HEMATOCRIT % 37 5   PLATELETS Thousands/uL 299         Operative acute blood loss anemia  · Decrease in hgb levels from preop labs results; suspect due to post operation extravasation losses along with potential dilutional effects of fluids  · Monitor follow up CBC post intervention to trend effect    H/o DVT/PE  · Takes eliquis 5mg bid  · Resumed 3/31     Bipolar/depression/anxiety  · Takes multiple medications including high dose ativan 2mg 4x daily  · Continue home regimen as ordered without interruption  · Psychiatry input appreciated recommend no changes in current meds including ativan     Chronic migraines  · F/[facility] headache clinic and neurology  · Cont current regimen     Hyperlipidemia  · Low cholesterol diet  · Continue statin therapy    Hypoxia  · Pulse oximetry without good tracing  · Encourage incentive spirometry  · Monitor respiratory status     [place]'s disease  · No documentation regarding chronic steroids  · Verified patient does not take chronic steroids     Hypotension  · Has taken midodrine in the past  · Does not currently use     Hypokalemia  · Continue supplementation    Chronic dizziness/frequent falls  · No further episodes  · Awaiting placement     H/o gastric sleeve  · Cont PPI    Loose stools  · Cdiff negative  · Add imodium as needed      OK for dc to rehab from medicine standpoint     PRE-OP HGB LEVEL: 13 1  The above assessment and plan was reviewed and updated as determined by my evaluation of the patient on 4/3/2021  Labs:   Results from last 7 days   Lab Units 04/02/21  1055 03/31/21  0630   WBC Thousand/uL 12 81* 15 42*   HEMOGLOBIN g/dL 11 9 12 5   HEMATOCRIT % 37 5 40 5   PLATELETS Thousands/uL 299 271     Results from last 7 days   Lab Units 04/02/21  0552 04/01/21  0509   SODIUM mmol/L 140 140   POTASSIUM mmol/L 4 4 4 9   CHLORIDE mmol/L 109* 108   CO2 mmol/L 30 29   BUN mg/dL 13 18   CREATININE mg/dL 0 78 0 95   CALCIUM mg/dL 9 2 9 2             Results from last 7 days   Lab Units 03/30/21  1153   POC GLUCOSE mg/dl 101       Review of Scheduled Meds:  Current Facility-Administered Medications   Medication Dose Route Frequency Provider Last Rate    acetaminophen  975 mg Oral [facility] Lynne Case MD      apixaban  5 mg Oral BID Kadeem Jacques PA-C      ARIPiprazole  5 mg Oral Daily Kadeem Jacques PA-C      calcium carbonate  1,000 mg Oral Daily PRN Kadeem Jacques PA-C      cholecalciferol  5,000 Units Oral Daily Kadeem Jacques PA-C      ferrous sulfate  325 mg Oral Daily With Breakfast Kadeem Jacques PA-C      fluvoxaMINE  300 mg Oral HS Kadeem Jacques PA-C      gabapentin  300 mg Oral TID Kadeem Jacques PA-C      lamoTRIgine  200 mg Oral Daily Kadeem Jacques PA-C      LORazepam  2 mg Oral 4x Daily Kadeem Jacques PA-C      meclizine  12 5 mg Oral Q8H PRN Kadeem Jacques PA-C      methocarbamol  500 mg Oral Q6H [facility] Lynne Case MD      nystatin  500,000 Units Swish & Swallow 4x Daily Emily Weller PA-C      ondansetron  4 mg Intravenous Q6H PRN Kadeem Jacques PA-C      oxyCODONE  2 5 mg Oral Q4H PRN Lynne Case MD      oxyCODONE  5 mg Oral Q4H PRN Lynne Case MD      potassium chloride  40 mEq Oral Daily Kadeem Jacques PA-C         Subjective/ HPI: Anatoly Zhu seen and examined  Patient's overnight issues or events were reviewed with nursing or staff during rounds or morning huddle session   New or overnight issues include the following:     Pt c/o loose stools again this a m ; Cdiff negative will add imodium as needed      ROS:   A 10 point ROS was performed; negative except as noted above  Imaging:     XR shoulder 2+ vw left   Final Result by Brittnee Cook MD (04/01 1007)      Unremarkable appearance of the left shoulder, status post joint replacement  Workstation performed: [id number]             *Labs /Radiology studies Reviewed  *Medications  reviewed and reconciled as needed  *Please refer to order section for additional ordered labs studies  *Case discussed with primary attending during morning huddle case rounds    Physical Examination:  Vitals:   Vitals:    04/02/21 0851 04/02/21 1521 04/03/21 0000 04/03/21 0738   BP:  146/82 145/82 143/82   Pulse: 97 93 95 94   Resp:   22 18   Temp:  99 °F (37 2 °C) 99 3 °F (37 4 °C) 98 2 °F (36 8 °C)   TempSrc:       SpO2: 93% 93% 91% 95%   Weight:       Height:           GEN: No apparent distress, interactive  NEURO: Alert and oriented x3  HEENT: Pupils are equal and reactive, EOMI, mucous membranes are moist, face symmetrical  CV: S1 S2 regular, no MRG, no peripheral edema noted  RESP: Lungs are clear bilaterally, no wheezes, rales or rhonchi noted, on room air, respirations easy and non labored  GI: Flat, soft non tender, non distended; +BS x4  : Voiding without difficulty  MUSC: Moves all extremities; except LUE +immobilizer in place  SKIN: pink, warm and dry, normal turgor, no rashes, lesions      The above physical exam was reviewed and updated as determined by my evaluation of the patient on 4/3/2021  Invasive Devices     Peripheral Intravenous Line            Peripheral IV 04/03/21 Dorsal (posterior); Right Forearm less than 1 day                   VTE Pharmacologic Prophylaxis: Sequential pneumatic compression stocking  Code Status: Level 1 - Full Code  Current Length of Stay: 4 day(s)      Total time spent:  30 minutes with more than 50% spent counseling/coordinating care  Counseling includes discussion with patient re: progress  and discussion with patient of his/her current medical state/information  Coordination of patient's care was performed in conjunction with primary service  Time invested included review of patient's labs, vitals, and management of their comorbidities with continued monitoring  In addition, this patient was discussed with medical team including physician and advanced extenders  The care of the patient was extensively discussed and appropriate treatment plan was formulated unique for this patient  ** Please Note:  voice to text software may have been used in the creation of this document   Although proof errors in transcription or interpretation are a potential of such software**

## 2021-04-03 NOTE — PLAN OF CARE
Problem: Potential for Falls  Goal: Patient will remain free of falls  Description: INTERVENTIONS:  - Assess patient frequently for physical needs  -  Identify cognitive and physical deficits and behaviors that affect risk of falls    -  [place] fall precautions as indicated by assessment   - Educate patient/family on patient safety including physical limitations  - Instruct patient to call for assistance with activity based on assessment  - Modify environment to reduce risk of injury  - Consider OT/PT consult to assist with strengthening/mobility  4/3/2021 1140 by Shannon Taylor RN  Outcome: Progressing  4/3/2021 1140 by Shannon Taylor RN  Outcome: Progressing     Problem: PAIN - ADULT  Goal: Verbalizes/displays adequate comfort level or baseline comfort level  Description: Interventions:  - Encourage patient to monitor pain and request assistance  - Assess pain using appropriate pain scale  - Administer analgesics based on type and severity of pain and evaluate response  - Implement non-pharmacological measures as appropriate and evaluate response  - Consider cultural and social influences on pain and pain management  - Notify physician/advanced practitioner if interventions unsuccessful or patient reports new pain  4/3/2021 1140 by Shannon Taylor RN  Outcome: Progressing  4/3/2021 1140 by Shannon Taylor RN  Outcome: Progressing     Problem: INFECTION - ADULT  Goal: Absence or prevention of progression during hospitalization  Description: INTERVENTIONS:  - Assess and monitor for signs and symptoms of infection  - Monitor lab/diagnostic results  - Monitor all insertion sites, i e  indwelling lines, tubes, and drains  - Monitor endotracheal if appropriate and nasal secretions for changes in amount and color  - [place] appropriate cooling/warming therapies per order  - Administer medications as ordered  - Instruct and encourage patient and family to use good hand hygiene technique  - Identify and instruct in appropriate isolation precautions for identified infection/condition  4/3/2021 1140 by Lashonda Acosta RN  Outcome: Progressing  4/3/2021 1140 by Lashonda Acosta RN  Outcome: Progressing  Goal: Absence of fever/infection during neutropenic period  Description: INTERVENTIONS:  - Monitor WBC    4/3/2021 1140 by Lashonda Acosta RN  Outcome: Progressing  4/3/2021 1140 by Lashonda Acosta RN  Outcome: Progressing     Problem: SAFETY ADULT  Goal: Patient will remain free of falls  Description: INTERVENTIONS:  - Assess patient frequently for physical needs  -  Identify cognitive and physical deficits and behaviors that affect risk of falls    -  [place] fall precautions as indicated by assessment   - Educate patient/family on patient safety including physical limitations  - Instruct patient to call for assistance with activity based on assessment  - Modify environment to reduce risk of injury  - Consider OT/PT consult to assist with strengthening/mobility  4/3/2021 1140 by Lashonda Acosta RN  Outcome: Progressing  4/3/2021 1140 by Lashonda Acosta RN  Outcome: Progressing  Goal: Maintain or return to baseline ADL function  Description: INTERVENTIONS:  -  Assess patient's ability to carry out ADLs; assess patient's baseline for ADL function and identify physical deficits which impact ability to perform ADLs (bathing, care of mouth/teeth, toileting, grooming, dressing, etc )  - Assess/evaluate cause of self-care deficits   - Assess range of motion  - Assess patient's mobility; develop plan if impaired  - Assess patient's need for assistive devices and provide as appropriate  - Encourage maximum independence but intervene and supervise when necessary  - Involve family in performance of ADLs  - Assess for home care needs following discharge   - Consider OT consult to assist with ADL evaluation and planning for discharge  - Provide patient education as appropriate  4/3/2021 1140 by Lashonda Acosta RN  Outcome: Progressing  4/3/2021 1140 by Aliza Ward RN  Outcome: Progressing  Goal: Maintain or return mobility status to optimal level  Description: INTERVENTIONS:  - Assess patient's baseline mobility status (ambulation, transfers, stairs, etc )    - Identify cognitive and physical deficits and behaviors that affect mobility  - Identify mobility aids required to assist with transfers and/or ambulation (gait belt, sit-to-stand, lift, walker, cane, etc )  - [place] fall precautions as indicated by assessment  - Record patient progress and toleration of activity level on Mobility SBAR; progress patient to next Phase/Stage  - Instruct patient to call for assistance with activity based on assessment  - Consider rehabilitation consult to assist with strengthening/weightbearing, etc   4/3/2021 1140 by Aliza Ward RN  Outcome: Progressing  4/3/2021 1140 by Aliza Ward RN  Outcome: Progressing     Problem: DISCHARGE PLANNING  Goal: Discharge to home or other facility with appropriate resources  Description: INTERVENTIONS:  - Identify barriers to discharge w/patient and caregiver  - Arrange for needed discharge resources and transportation as appropriate  - Identify discharge learning needs (meds, wound care, etc )  - Arrange for interpretive services to assist at discharge as needed  - Refer to Case Management Department for coordinating discharge planning if the patient needs post-hospital services based on physician/advanced practitioner order or complex needs related to functional status, cognitive ability, or social support system  4/3/2021 1140 by Aliza Ward RN  Outcome: Progressing  4/3/2021 1140 by Aliza Ward RN  Outcome: Progressing     Problem: Knowledge Deficit  Goal: Patient/family/caregiver demonstrates understanding of disease process, treatment plan, medications, and discharge instructions  Description: Complete learning assessment and assess knowledge base    Interventions:  - Provide teaching at level of understanding  - Provide teaching via preferred learning methods  4/3/2021 1140 by Aliza Ward, RN  Outcome: Progressing  4/3/2021 1140 by Aliza Ward, RN  Outcome: Progressing

## 2021-04-03 NOTE — PROGRESS NOTES
Progress Note - Orthopedics   Mela Duncan 56 y o  female MRN: [id number]  Unit/Bed#: -01      Subjective:    56 y  o female POD #4 Left total shoulder arthroplasty  No acute events regarding the shoulder  Patient however had severe watery stools yesterday AM and C diff test ordered, which was negative  She states her stools are still loose but per nursing she hasn't had any today  Also patient admits she has taken her sling off multiple times overnight and nursing confirms this      Labs:  0   Lab Value Date/Time    HCT 37 5 04/02/2021 1055    HCT 40 5 03/31/2021 0630    HCT 40 1 03/17/2021 1421    HCT 38 1 07/07/2015 1604    HCT 33 9 (L) 07/03/2015 0537    HCT 33 3 (L) 07/01/2015 0516    HGB 11 9 04/02/2021 1055    HGB 12 5 03/31/2021 0630    HGB 13 1 03/17/2021 1421    HGB 12 3 07/07/2015 1604    HGB 10 9 (L) 07/03/2015 0537    HGB 10 8 (L) 07/01/2015 0516    INR 1 15 03/17/2021 1421    INR 0 90 05/07/2015 1030    WBC 12 81 (H) 04/02/2021 1055    WBC 15 42 (H) 03/31/2021 0630    WBC 9 61 03/17/2021 1421    WBC 7 66 07/07/2015 1604    WBC 8 47 07/03/2015 0537    WBC 8 02 07/01/2015 0516    ESR 24 (H) 10/05/2019 0548       Meds:    Current Facility-Administered Medications:     acetaminophen (TYLENOL) tablet 975 mg, 975 mg, Oral, Q8H [address], Jenny Cárdenas MD, 975 mg at 04/03/21 0524    apixaban (ELIQUIS) tablet 5 mg, 5 mg, Oral, BID, Maggi Lima PA-C, 5 mg at 04/02/21 1717    ARIPiprazole (ABILIFY) tablet 5 mg, 5 mg, Oral, Daily, Maggi Lima PA-C, 5 mg at 04/02/21 0849    calcium carbonate (TUMS) chewable tablet 1,000 mg, 1,000 mg, Oral, Daily PRN, Maggi Lima PA-C    cholecalciferol (VITAMIN D3) tablet 5,000 Units, 5,000 Units, Oral, Daily, Maggi Lima PA-C, 5,000 Units at 04/02/21 0845    ferrous sulfate tablet 325 mg, 325 mg, Oral, Daily With Breakfast, Maggi Lima PA-C, 325 mg at 04/02/21 0846    fluvoxaMINE (LUVOX) tablet 300 mg, 300 mg, Oral, HS, Maggi Lima PA-C, 300 mg at 04/02/21 2251    gabapentin (NEURONTIN) capsule 300 mg, 300 mg, Oral, TID, Yohana Goldstein PA-C, 300 mg at 04/02/21 2250    lamoTRIgine (LaMICtal) tablet 200 mg, 200 mg, Oral, Daily, Yohana Goldstein PA-C, 200 mg at 04/02/21 0846    LORazepam (ATIVAN) tablet 2 mg, 2 mg, Oral, 4x Daily, Yohana Goldstein PA-C, 2 mg at 04/02/21 2250    meclizine (ANTIVERT) tablet 12 5 mg, 12 5 mg, Oral, Q8H PRN, Yohana Goldstein PA-C, 12 5 mg at 04/02/21 0846    methocarbamol (ROBAXIN) tablet 500 mg, 500 mg, Oral, Q6H [facility], Angelita Goldman MD, 500 mg at 04/03/21 0524    nystatin (MYCOSTATIN) oral suspension 500,000 Units, 500,000 Units, Swish & Swallow, 4x Daily, Emily Weller PA-C, 500,000 Units at 04/02/21 2250    ondansetron (ZOFRAN) injection 4 mg, 4 mg, Intravenous, Q6H PRN, Yohana Goldstein PA-C    oxyCODONE (ROXICODONE) IR tablet 2 5 mg, 2 5 mg, Oral, Q4H PRN, Angelita Goldman MD    oxyCODONE (ROXICODONE) IR tablet 5 mg, 5 mg, Oral, Q4H PRN, Angelita Goldman MD, 5 mg at 04/02/21 1954    potassium chloride (K-DUR,KLOR-CON) CR tablet 40 mEq, 40 mEq, Oral, Daily, Yohana Goldstein PA-C, 40 mEq at 04/02/21 0846    Blood Culture:   No results found for: BLOODCX    Wound Culture:   No results found for: WOUNDCULT    Ins and Outs:  I/O last 24 hours: In: 718 [P O :718]  Out: 1600 [Urine:1600]          Physical:  Vitals:    04/03/21 0000   BP: 145/82   Pulse: 95   Resp: 22   Temp: 99 3 °F (37 4 °C)   SpO2: 91%     Musculoskeletal: left Upper Extremity  · Skin - some mild ecchymosis along distal bandage  · Dressing - c/d/i, sling in place this am  · SILT m/r/u  · Motor intact ain/pin/m/r/u  · 2+ rad pulse    _*_*_*_*_*_*_*_*_*_*_*_*_*_*_*_*_*_*_*_*_*_*_*_*_*_*_*_*_*_*_*_*_*_*_*_*_*_*_*_*_*    Assessment:    56 y  o female POD #4 Left total shoulder arthroplasty      Plan:  · NWB LUE  · Up and out of bed  · Sling for comfort, may remove for pendulums and hygiene  · Reiterated importance of keeping the sling on otherwise and how if she takes this off it could greatly affect the outcome of her surgery  · Appreciate medicine recs  · Pain control  · DVT ppx - mechanical   · Patient noted to have acute blood loss anemia due to a drop in Hbg of > 2 0g from preop levels, will monitor vital signs and resuscitate with IV fluids as needed  · Dispo: Ortho will follow    Vira Hernandez PA-C

## 2021-04-04 PROCEDURE — 99024 POSTOP FOLLOW-UP VISIT: CPT | Performed by: PHYSICIAN ASSISTANT

## 2021-04-04 RX ADMIN — LAMOTRIGINE 200 MG: 100 TABLET ORAL at 09:14

## 2021-04-04 RX ADMIN — LORAZEPAM 2 MG: 1 TABLET ORAL at 17:37

## 2021-04-04 RX ADMIN — GABAPENTIN 300 MG: 300 CAPSULE ORAL at 09:14

## 2021-04-04 RX ADMIN — NYSTATIN 500000 UNITS: 100000 SUSPENSION ORAL at 17:37

## 2021-04-04 RX ADMIN — APIXABAN 5 MG: 5 TABLET, FILM COATED ORAL at 17:37

## 2021-04-04 RX ADMIN — METHOCARBAMOL 500 MG: 500 TABLET, FILM COATED ORAL at 05:43

## 2021-04-04 RX ADMIN — METHOCARBAMOL 500 MG: 500 TABLET, FILM COATED ORAL at 17:37

## 2021-04-04 RX ADMIN — FERROUS SULFATE TAB 325 MG (65 MG ELEMENTAL FE) 325 MG: 325 (65 FE) TAB at 09:15

## 2021-04-04 RX ADMIN — ACETAMINOPHEN 975 MG: 325 TABLET, FILM COATED ORAL at 15:15

## 2021-04-04 RX ADMIN — GABAPENTIN 300 MG: 300 CAPSULE ORAL at 15:15

## 2021-04-04 RX ADMIN — ACETAMINOPHEN 975 MG: 325 TABLET, FILM COATED ORAL at 22:08

## 2021-04-04 RX ADMIN — LORAZEPAM 2 MG: 1 TABLET ORAL at 22:07

## 2021-04-04 RX ADMIN — OXYCODONE HYDROCHLORIDE 5 MG: 5 TABLET ORAL at 22:07

## 2021-04-04 RX ADMIN — NYSTATIN 500000 UNITS: 100000 SUSPENSION ORAL at 12:48

## 2021-04-04 RX ADMIN — ARIPIPRAZOLE 5 MG: 5 TABLET ORAL at 09:15

## 2021-04-04 RX ADMIN — ONDANSETRON 4 MG: 2 INJECTION INTRAMUSCULAR; INTRAVENOUS at 11:22

## 2021-04-04 RX ADMIN — APIXABAN 5 MG: 5 TABLET, FILM COATED ORAL at 09:15

## 2021-04-04 RX ADMIN — FLUVOXAMINE MALEATE 300 MG: 50 TABLET ORAL at 22:09

## 2021-04-04 RX ADMIN — LORAZEPAM 2 MG: 1 TABLET ORAL at 12:48

## 2021-04-04 RX ADMIN — NYSTATIN 500000 UNITS: 100000 SUSPENSION ORAL at 22:08

## 2021-04-04 RX ADMIN — POTASSIUM CHLORIDE 40 MEQ: 1500 TABLET, EXTENDED RELEASE ORAL at 09:15

## 2021-04-04 RX ADMIN — ONDANSETRON 4 MG: 2 INJECTION INTRAMUSCULAR; INTRAVENOUS at 17:37

## 2021-04-04 RX ADMIN — METHOCARBAMOL 500 MG: 500 TABLET, FILM COATED ORAL at 12:48

## 2021-04-04 RX ADMIN — OXYCODONE HYDROCHLORIDE 5 MG: 5 TABLET ORAL at 17:38

## 2021-04-04 RX ADMIN — OXYCODONE HYDROCHLORIDE 5 MG: 5 TABLET ORAL at 05:50

## 2021-04-04 RX ADMIN — GABAPENTIN 300 MG: 300 CAPSULE ORAL at 22:08

## 2021-04-04 RX ADMIN — NYSTATIN 500000 UNITS: 100000 SUSPENSION ORAL at 09:15

## 2021-04-04 RX ADMIN — OXYCODONE HYDROCHLORIDE 5 MG: 5 TABLET ORAL at 12:49

## 2021-04-04 RX ADMIN — LORAZEPAM 2 MG: 1 TABLET ORAL at 09:15

## 2021-04-04 RX ADMIN — Medication 5000 UNITS: at 09:14

## 2021-04-04 RX ADMIN — ACETAMINOPHEN 975 MG: 325 TABLET, FILM COATED ORAL at 05:43

## 2021-04-04 RX ADMIN — METHOCARBAMOL 500 MG: 500 TABLET, FILM COATED ORAL at 22:08

## 2021-04-04 NOTE — PLAN OF CARE
Problem: Potential for Falls  Goal: Patient will remain free of falls  Description: INTERVENTIONS:  - Assess patient frequently for physical needs  -  Identify cognitive and physical deficits and behaviors that affect risk of falls    -  [place] fall precautions as indicated by assessment   - Educate patient/family on patient safety including physical limitations  - Instruct patient to call for assistance with activity based on assessment  - Modify environment to reduce risk of injury  - Consider OT/PT consult to assist with strengthening/mobility  Outcome: Progressing     Problem: PAIN - ADULT  Goal: Verbalizes/displays adequate comfort level or baseline comfort level  Description: Interventions:  - Encourage patient to monitor pain and request assistance  - Assess pain using appropriate pain scale  - Administer analgesics based on type and severity of pain and evaluate response  - Implement non-pharmacological measures as appropriate and evaluate response  - Consider cultural and social influences on pain and pain management  - Notify physician/advanced practitioner if interventions unsuccessful or patient reports new pain  Outcome: Progressing     Problem: INFECTION - ADULT  Goal: Absence or prevention of progression during hospitalization  Description: INTERVENTIONS:  - Assess and monitor for signs and symptoms of infection  - Monitor lab/diagnostic results  - Monitor all insertion sites, i e  indwelling lines, tubes, and drains  - Monitor endotracheal if appropriate and nasal secretions for changes in amount and color  - [place] appropriate cooling/warming therapies per order  - Administer medications as ordered  - Instruct and encourage patient and family to use good hand hygiene technique  - Identify and instruct in appropriate isolation precautions for identified infection/condition  Outcome: Progressing  Goal: Absence of fever/infection during neutropenic period  Description: INTERVENTIONS:  - Monitor WBC    Outcome: Progressing     Problem: SAFETY ADULT  Goal: Patient will remain free of falls  Description: INTERVENTIONS:  - Assess patient frequently for physical needs  -  Identify cognitive and physical deficits and behaviors that affect risk of falls    -  [place] fall precautions as indicated by assessment   - Educate patient/family on patient safety including physical limitations  - Instruct patient to call for assistance with activity based on assessment  - Modify environment to reduce risk of injury  - Consider OT/PT consult to assist with strengthening/mobility  Outcome: Progressing  Goal: Maintain or return to baseline ADL function  Description: INTERVENTIONS:  -  Assess patient's ability to carry out ADLs; assess patient's baseline for ADL function and identify physical deficits which impact ability to perform ADLs (bathing, care of mouth/teeth, toileting, grooming, dressing, etc )  - Assess/evaluate cause of self-care deficits   - Assess range of motion  - Assess patient's mobility; develop plan if impaired  - Assess patient's need for assistive devices and provide as appropriate  - Encourage maximum independence but intervene and supervise when necessary  - Involve family in performance of ADLs  - Assess for home care needs following discharge   - Consider OT consult to assist with ADL evaluation and planning for discharge  - Provide patient education as appropriate  Outcome: Progressing  Goal: Maintain or return mobility status to optimal level  Description: INTERVENTIONS:  - Assess patient's baseline mobility status (ambulation, transfers, stairs, etc )    - Identify cognitive and physical deficits and behaviors that affect mobility  - Identify mobility aids required to assist with transfers and/or ambulation (gait belt, sit-to-stand, lift, walker, cane, etc )  - [place] fall precautions as indicated by assessment  - Record patient progress and toleration of activity level on Mobility SBAR; progress patient to next Phase/Stage  - Instruct patient to call for assistance with activity based on assessment  - Consider rehabilitation consult to assist with strengthening/weightbearing, etc   Outcome: Progressing     Problem: DISCHARGE PLANNING  Goal: Discharge to home or other facility with appropriate resources  Description: INTERVENTIONS:  - Identify barriers to discharge w/patient and caregiver  - Arrange for needed discharge resources and transportation as appropriate  - Identify discharge learning needs (meds, wound care, etc )  - Arrange for interpretive services to assist at discharge as needed  - Refer to Case Management Department for coordinating discharge planning if the patient needs post-hospital services based on physician/advanced practitioner order or complex needs related to functional status, cognitive ability, or social support system  Outcome: Progressing     Problem: Knowledge Deficit  Goal: Patient/family/caregiver demonstrates understanding of disease process, treatment plan, medications, and discharge instructions  Description: Complete learning assessment and assess knowledge base    Interventions:  - Provide teaching at level of understanding  - Provide teaching via preferred learning methods  Outcome: Progressing

## 2021-04-04 NOTE — PROGRESS NOTES
Progress Note - Orthopedics   Javier Carcamo 56 y o  female MRN: [id number]  Unit/Bed#: -01      Subjective:    56 y  o female POD #5 left total shoulder athroplasty  No acute events, no complaints  Pt doing well  Denies other consitutional complaints  States she's been better at keeping her sling on and RN supports this      Labs:  0   Lab Value Date/Time    HCT 37 5 04/02/2021 1055    HCT 40 5 03/31/2021 0630    HCT 40 1 03/17/2021 1421    HCT 38 1 07/07/2015 1604    HCT 33 9 (L) 07/03/2015 0537    HCT 33 3 (L) 07/01/2015 0516    HGB 11 9 04/02/2021 1055    HGB 12 5 03/31/2021 0630    HGB 13 1 03/17/2021 1421    HGB 12 3 07/07/2015 1604    HGB 10 9 (L) 07/03/2015 0537    HGB 10 8 (L) 07/01/2015 0516    INR 1 15 03/17/2021 1421    INR 0 90 05/07/2015 1030    WBC 12 81 (H) 04/02/2021 1055    WBC 15 42 (H) 03/31/2021 0630    WBC 9 61 03/17/2021 1421    WBC 7 66 07/07/2015 1604    WBC 8 47 07/03/2015 0537    WBC 8 02 07/01/2015 0516    ESR 24 (H) 10/05/2019 0548       Meds:    Current Facility-Administered Medications:     acetaminophen (TYLENOL) tablet 975 mg, 975 mg, Oral, Q8H [address], Patt Cordero MD, 975 mg at 04/04/21 0543    apixaban (ELIQUIS) tablet 5 mg, 5 mg, Oral, BID, Era Fanti, PA-C, 5 mg at 04/03/21 1711    ARIPiprazole (ABILIFY) tablet 5 mg, 5 mg, Oral, Daily, Era Shital, PA-C, 5 mg at 04/03/21 9420    calcium carbonate (TUMS) chewable tablet 1,000 mg, 1,000 mg, Oral, Daily PRN, Khadijah Isaacs PA-C    cholecalciferol (VITAMIN D3) tablet 5,000 Units, 5,000 Units, Oral, Daily, Era Fanti, PA-C, 5,000 Units at 04/03/21 0830    ferrous sulfate tablet 325 mg, 325 mg, Oral, Daily With Breakfast, Era Fanti, PA-C, 325 mg at 04/03/21 0830    fluvoxaMINE (LUVOX) tablet 300 mg, 300 mg, Oral, HS, Era Fanti, PA-C, 300 mg at 04/03/21 2254    gabapentin (NEURONTIN) capsule 300 mg, 300 mg, Oral, TID, Era Fanti, PA-C, 300 mg at 04/03/21 2253    lamoTRIgine (LaMICtal) tablet 200 mg, 200 mg, Oral, Daily, Júnior Milton PA-C, 200 mg at 04/03/21 0830    loperamide (IMODIUM) capsule 2 mg, 2 mg, Oral, TID PRN, MOON Wilkins    LORazepam (ATIVAN) tablet 2 mg, 2 mg, Oral, 4x Daily, Júnior Milton PA-C, 2 mg at 04/03/21 2252    meclizine (ANTIVERT) tablet 12 5 mg, 12 5 mg, Oral, Q8H PRN, Júnior Milton PA-C, 12 5 mg at 04/02/21 0846    methocarbamol (ROBAXIN) tablet 500 mg, 500 mg, Oral, Q6H [address], Nelida Mccain MD, 500 mg at 04/04/21 0543    nystatin (MYCOSTATIN) oral suspension 500,000 Units, 500,000 Units, Swish & Swallow, 4x Daily, Emily Weller PA-C, 500,000 Units at 04/03/21 2253    ondansetron (ZOFRAN) injection 4 mg, 4 mg, Intravenous, Q6H PRN, Júnior Milton PA-C    oxyCODONE (ROXICODONE) IR tablet 2 5 mg, 2 5 mg, Oral, Q4H PRN, Nelida Mccain MD    oxyCODONE (ROXICODONE) IR tablet 5 mg, 5 mg, Oral, Q4H PRN, Nelida Mccain MD, 5 mg at 04/04/21 0550    potassium chloride (K-DUR,KLOR-CON) CR tablet 40 mEq, 40 mEq, Oral, Daily, Júnior Milton PA-C, 40 mEq at 04/03/21 0830    Blood Culture:   No results found for: BLOODCX    Wound Culture:   No results found for: WOUNDCULT    Ins and Outs:  I/O last 24 hours: In: 600 [P O :600]  Out: -           Physical:  Vitals:    04/03/21 2306   BP: 132/74   Pulse: 90   Resp: 20   Temp: 97 6 °F (36 4 °C)   SpO2: 96%     Musculoskeletal: left Upper Extremity  · Dressing c/d/i  · Sling in place  · SILT m/r/u  Motor intact ain/pin/m/r/u, 2+ rad pulse    _*_*_*_*_*_*_*_*_*_*_*_*_*_*_*_*_*_*_*_*_*_*_*_*_*_*_*_*_*_*_*_*_*_*_*_*_*_*_*_*_*    Assessment:    56 y  o female POD #5 left total shoulder athroplasty    Plan:  · NWB LUE in sling  · Sling may be removed for hygiene and pendulums  · Up and out of bed  · Pain control  · DVT ppx - mechanical  · Patient noted to have acute blood loss anemia due to a drop in Hbg of > 2 0g from preop levels, will monitor vital signs and resuscitate with IV fluids as needed  · Dispo: Yasir Maxwell for discharge from ortho perspective - pending placement    Vira Hernandez PA-C

## 2021-04-04 NOTE — PROGRESS NOTES
Internal Medicine Progress Note  Patient: Waleska Scruggs  Age/sex: 56 y o  female  Medical Record #: [id number]      ASSESSMENT/PLAN: (Interval History)  Waleska Scruggs is seen and examined and management for following issues:    Status Post left Total SHOULDER ARTHROPLASTY   Pain controlled   Continue encourage incentive spirometry; monitor fever curve   DVT prophylaxis in place and reviewed  Results from last 7 days   Lab Units 04/02/21  1055   WBC Thousand/uL 12 81*   HEMOGLOBIN g/dL 11 9   HEMATOCRIT % 37 5   PLATELETS Thousands/uL 299         Operative acute blood loss anemia  · Decrease in hgb levels from preop labs results; suspect due to post operation extravasation losses along with potential dilutional effects of fluids  · Monitor follow up CBC post intervention to trend effect  · stable    H/o DVT/PE  · cont eliquis 5mg bid     Bipolar/depression/anxiety  · Takes multiple medications including high dose ativan 2mg 4x daily  · Continue home regimen as ordered without interruption  · Psychiatry input appreciated recommend no changes in current meds including ativan  · stable     Chronic migraines  · F/b [facility] headache clinic and neurology  · Cont current regimen     Hyperlipidemia  · Low cholesterol diet  · Continue statin therapy    Hypoxia  · Pulse oximetry without good tracing  · Encourage incentive spirometry  · Monitor respiratory status     [place]'s disease  · No documentation regarding chronic steroids  · Verified patient does not take chronic steroids  · Doubt this is even a true diagnosis     Hypokalemia  · Continue supplementation  · Bmp in a m      Chronic dizziness/frequent falls  · No further episodes  · Awaiting placement     H/o gastric sleeve  · Cont PPI    Loose stools  · Cdiff negative  · Add imodium as needed  · improved      OK for dc to rehab from medicine standpoint     PRE-OP HGB LEVEL: 13 1  The above assessment and plan was reviewed and updated as determined by my evaluation of the patient on 4/4/2021  Labs:   Results from last 7 days   Lab Units 04/02/21  1055 03/31/21  0630   WBC Thousand/uL 12 81* 15 42*   HEMOGLOBIN g/dL 11 9 12 5   HEMATOCRIT % 37 5 40 5   PLATELETS Thousands/uL 299 271     Results from last 7 days   Lab Units 04/02/21  0552 04/01/21  0509   SODIUM mmol/L 140 140   POTASSIUM mmol/L 4 4 4 9   CHLORIDE mmol/L 109* 108   CO2 mmol/L 30 29   BUN mg/dL 13 18   CREATININE mg/dL 0 78 0 95   CALCIUM mg/dL 9 2 9 2             Results from last 7 days   Lab Units 03/30/21  1153   POC GLUCOSE mg/dl 101       Review of Scheduled Meds:  Current Facility-Administered Medications   Medication Dose Route Frequency Provider Last Rate    acetaminophen  975 mg Oral [facility] Terry Adrian MD      apixaban  5 mg Oral BID Mindy Signs, PA-C      ARIPiprazole  5 mg Oral Daily Mindy Signs, PA-C      calcium carbonate  1,000 mg Oral Daily PRN Mindy Signs, PA-C      cholecalciferol  5,000 Units Oral Daily Mindy Signs, PA-C      ferrous sulfate  325 mg Oral Daily With Breakfast Mindy Signs, PA-C      fluvoxaMINE  300 mg Oral HS Mindy Signs, PA-C      gabapentin  300 mg Oral TID Mindy Signs, PA-C      lamoTRIgine  200 mg Oral Daily Mindy Signs, PA-C      loperamide  2 mg Oral TID PRN MOON Arango      LORazepam  2 mg Oral 4x Daily Mindy Signs, PA-C      meclizine  12 5 mg Oral Q8H PRN Mindy Signs, PA-C      methocarbamol  500 mg Oral Q6H [address] Terry Adrian MD      nystatin  500,000 Units Swish & Swallow 4x Daily AVINASH Jones-YO      ondansetron  4 mg Intravenous Q6H PRN Mindy Signs, PA-C      oxyCODONE  2 5 mg Oral Q4H PRN Terry Adrian MD      oxyCODONE  5 mg Oral Q4H PRN Terry Adrian MD      potassium chloride  40 mEq Oral Daily Mindy Signs, PA-C         Subjective/ HPI: Gurpreetdne Latvian seen and examined   Patient's overnight issues or events were reviewed with nursing or staff during rounds or morning huddle session  New or overnight issues include the following:     Pt without any overnight issues or concerns; stools are improving      ROS:   A 10 point ROS was performed; negative except as noted above  Imaging:     XR shoulder 2+ vw left   Final Result by Destini Bourgeois MD (04/01 2719)      Unremarkable appearance of the left shoulder, status post joint replacement  Workstation performed: [id number]             *Labs /Radiology studies Reviewed  *Medications  reviewed and reconciled as needed  *Please refer to order section for additional ordered labs studies  *Case discussed with primary attending during morning huddle case rounds    Physical Examination:  Vitals:   Vitals:    04/03/21 0830 04/03/21 1425 04/03/21 2306 04/04/21 0722   BP:  141/80 132/74 126/76   Pulse:  92 90 88   Resp:  16 20 16   Temp:  99 5 °F (37 5 °C) 97 6 °F (36 4 °C) 98 1 °F (36 7 °C)   TempSrc:       SpO2: 95% 96% 96% 95%   Weight:       Height:           GEN: No apparent distress, interactive  NEURO: Alert and oriented x3  HEENT: Pupils are equal and reactive, EOMI, mucous membranes are moist, face symmetrical  CV: S1 S2 regular, no MRG, no peripheral edema noted  RESP: Lungs are clear bilaterally, no wheezes, rales or rhonchi noted, on room air, respirations easy and non labored  GI: Flat, soft non tender, non distended; +BS x4  : Voiding without difficulty  MUSC: Moves all extremities; except LUE +immobilizer in place  SKIN: pink, warm and dry, normal turgor, no rashes, lesions      The above physical exam was reviewed and updated as determined by my evaluation of the patient on 4/4/2021  Invasive Devices     Peripheral Intravenous Line            Peripheral IV 04/03/21 Dorsal (posterior); Right Forearm 1 day                   VTE Pharmacologic Prophylaxis: Sequential pneumatic compression stocking  Code Status: Level 1 - Full Code  Current Length of Stay: 5 day(s)      Total time spent:  30 minutes with more than 50% spent counseling/coordinating care  Counseling includes discussion with patient re: progress  and discussion with patient of his/her current medical state/information  Coordination of patient's care was performed in conjunction with primary service  Time invested included review of patient's labs, vitals, and management of their comorbidities with continued monitoring  In addition, this patient was discussed with medical team including physician and advanced extenders  The care of the patient was extensively discussed and appropriate treatment plan was formulated unique for this patient  ** Please Note:  voice to text software may have been used in the creation of this document   Although proof errors in transcription or interpretation are a potential of such software**

## 2021-04-05 LAB
ANION GAP SERPL CALCULATED.3IONS-SCNC: 2 MMOL/L (ref 4–13)
BASOPHILS # BLD AUTO: 0.05 THOUSANDS/ΜL (ref 0–0.1)
BASOPHILS NFR BLD AUTO: 1 % (ref 0–1)
BUN SERPL-MCNC: 13 MG/DL (ref 5–25)
CALCIUM SERPL-MCNC: 8.9 MG/DL (ref 8.3–10.1)
CHLORIDE SERPL-SCNC: 108 MMOL/L (ref 100–108)
CO2 SERPL-SCNC: 30 MMOL/L (ref 21–32)
CREAT SERPL-MCNC: 0.81 MG/DL (ref 0.6–1.3)
EOSINOPHIL # BLD AUTO: 0.44 THOUSAND/ΜL (ref 0–0.61)
EOSINOPHIL NFR BLD AUTO: 5 % (ref 0–6)
ERYTHROCYTE [DISTWIDTH] IN BLOOD BY AUTOMATED COUNT: 16 % (ref 11.6–15.1)
FLUAV RNA RESP QL NAA+PROBE: NEGATIVE
FLUBV RNA RESP QL NAA+PROBE: NEGATIVE
GFR SERPL CREATININE-BSD FRML MDRD: 78 ML/MIN/1.73SQ M
GLUCOSE SERPL-MCNC: 80 MG/DL (ref 65–140)
HCT VFR BLD AUTO: 35.7 % (ref 34.8–46.1)
HGB BLD-MCNC: 11.4 G/DL (ref 11.5–15.4)
IMM GRANULOCYTES # BLD AUTO: 0.11 THOUSAND/UL (ref 0–0.2)
IMM GRANULOCYTES NFR BLD AUTO: 1 % (ref 0–2)
LYMPHOCYTES # BLD AUTO: 2.15 THOUSANDS/ΜL (ref 0.6–4.47)
LYMPHOCYTES NFR BLD AUTO: 23 % (ref 14–44)
MCH RBC QN AUTO: 30.3 PG (ref 26.8–34.3)
MCHC RBC AUTO-ENTMCNC: 31.9 G/DL (ref 31.4–37.4)
MCV RBC AUTO: 95 FL (ref 82–98)
MONOCYTES # BLD AUTO: 0.89 THOUSAND/ΜL (ref 0.17–1.22)
MONOCYTES NFR BLD AUTO: 10 % (ref 4–12)
NEUTROPHILS # BLD AUTO: 5.66 THOUSANDS/ΜL (ref 1.85–7.62)
NEUTS SEG NFR BLD AUTO: 60 % (ref 43–75)
NRBC BLD AUTO-RTO: 0 /100 WBCS
PLATELET # BLD AUTO: 343 THOUSANDS/UL (ref 149–390)
PMV BLD AUTO: 10.6 FL (ref 8.9–12.7)
POTASSIUM SERPL-SCNC: 4.1 MMOL/L (ref 3.5–5.3)
RBC # BLD AUTO: 3.76 MILLION/UL (ref 3.81–5.12)
RSV RNA RESP QL NAA+PROBE: NEGATIVE
SARS-COV-2 RNA RESP QL NAA+PROBE: NEGATIVE
SODIUM SERPL-SCNC: 140 MMOL/L (ref 136–145)
WBC # BLD AUTO: 9.3 THOUSAND/UL (ref 4.31–10.16)

## 2021-04-05 PROCEDURE — 0241U HB NFCT DS VIR RESP RNA 4 TRGT: CPT | Performed by: ORTHOPAEDIC SURGERY

## 2021-04-05 PROCEDURE — 97116 GAIT TRAINING THERAPY: CPT

## 2021-04-05 PROCEDURE — 80048 BASIC METABOLIC PNL TOTAL CA: CPT | Performed by: NURSE PRACTITIONER

## 2021-04-05 PROCEDURE — 99024 POSTOP FOLLOW-UP VISIT: CPT | Performed by: PHYSICIAN ASSISTANT

## 2021-04-05 PROCEDURE — 97110 THERAPEUTIC EXERCISES: CPT

## 2021-04-05 PROCEDURE — 85025 COMPLETE CBC W/AUTO DIFF WBC: CPT | Performed by: NURSE PRACTITIONER

## 2021-04-05 PROCEDURE — 97535 SELF CARE MNGMENT TRAINING: CPT

## 2021-04-05 RX ADMIN — OXYCODONE HYDROCHLORIDE 5 MG: 5 TABLET ORAL at 05:42

## 2021-04-05 RX ADMIN — LORAZEPAM 2 MG: 1 TABLET ORAL at 17:09

## 2021-04-05 RX ADMIN — NYSTATIN 500000 UNITS: 100000 SUSPENSION ORAL at 17:09

## 2021-04-05 RX ADMIN — ACETAMINOPHEN 975 MG: 325 TABLET, FILM COATED ORAL at 05:42

## 2021-04-05 RX ADMIN — POTASSIUM CHLORIDE 40 MEQ: 1500 TABLET, EXTENDED RELEASE ORAL at 08:30

## 2021-04-05 RX ADMIN — APIXABAN 5 MG: 5 TABLET, FILM COATED ORAL at 08:31

## 2021-04-05 RX ADMIN — NYSTATIN 500000 UNITS: 100000 SUSPENSION ORAL at 21:23

## 2021-04-05 RX ADMIN — NYSTATIN 500000 UNITS: 100000 SUSPENSION ORAL at 11:04

## 2021-04-05 RX ADMIN — FERROUS SULFATE TAB 325 MG (65 MG ELEMENTAL FE) 325 MG: 325 (65 FE) TAB at 08:30

## 2021-04-05 RX ADMIN — LORAZEPAM 2 MG: 1 TABLET ORAL at 08:30

## 2021-04-05 RX ADMIN — FLUVOXAMINE MALEATE 300 MG: 50 TABLET ORAL at 21:26

## 2021-04-05 RX ADMIN — NYSTATIN 500000 UNITS: 100000 SUSPENSION ORAL at 08:31

## 2021-04-05 RX ADMIN — ACETAMINOPHEN 975 MG: 325 TABLET, FILM COATED ORAL at 21:23

## 2021-04-05 RX ADMIN — ONDANSETRON 4 MG: 2 INJECTION INTRAMUSCULAR; INTRAVENOUS at 08:31

## 2021-04-05 RX ADMIN — METHOCARBAMOL 500 MG: 500 TABLET, FILM COATED ORAL at 17:09

## 2021-04-05 RX ADMIN — GABAPENTIN 300 MG: 300 CAPSULE ORAL at 21:23

## 2021-04-05 RX ADMIN — OXYCODONE HYDROCHLORIDE 5 MG: 5 TABLET ORAL at 13:03

## 2021-04-05 RX ADMIN — ARIPIPRAZOLE 5 MG: 5 TABLET ORAL at 08:31

## 2021-04-05 RX ADMIN — GABAPENTIN 300 MG: 300 CAPSULE ORAL at 08:31

## 2021-04-05 RX ADMIN — APIXABAN 5 MG: 5 TABLET, FILM COATED ORAL at 17:09

## 2021-04-05 RX ADMIN — LORAZEPAM 2 MG: 1 TABLET ORAL at 11:04

## 2021-04-05 RX ADMIN — METHOCARBAMOL 500 MG: 500 TABLET, FILM COATED ORAL at 05:42

## 2021-04-05 RX ADMIN — OXYCODONE HYDROCHLORIDE 5 MG: 5 TABLET ORAL at 16:39

## 2021-04-05 RX ADMIN — OXYCODONE HYDROCHLORIDE 5 MG: 5 TABLET ORAL at 21:24

## 2021-04-05 RX ADMIN — LAMOTRIGINE 200 MG: 100 TABLET ORAL at 08:31

## 2021-04-05 RX ADMIN — METHOCARBAMOL 500 MG: 500 TABLET, FILM COATED ORAL at 11:04

## 2021-04-05 RX ADMIN — ONDANSETRON 4 MG: 2 INJECTION INTRAMUSCULAR; INTRAVENOUS at 19:56

## 2021-04-05 RX ADMIN — LORAZEPAM 2 MG: 1 TABLET ORAL at 21:23

## 2021-04-05 RX ADMIN — ACETAMINOPHEN 975 MG: 325 TABLET, FILM COATED ORAL at 13:03

## 2021-04-05 RX ADMIN — Medication 5000 UNITS: at 08:30

## 2021-04-05 RX ADMIN — GABAPENTIN 300 MG: 300 CAPSULE ORAL at 16:39

## 2021-04-05 NOTE — PROGRESS NOTES
Internal Medicine Progress Note  Patient: John Bo  Age/sex: 56 y o  female  Medical Record #: [id number]      ASSESSMENT/PLAN: (Interval History)  John Bo is seen and examined and management for following issues:    Status Post left Total SHOULDER ARTHROPLASTY   Pain controlled   Continue encourage incentive spirometry; monitor fever curve   DVT prophylaxis in place and reviewed  Results from last 7 days   Lab Units 04/05/21  0538   WBC Thousand/uL 9 30   HEMOGLOBIN g/dL 11 4*   HEMATOCRIT % 35 7   PLATELETS Thousands/uL 343         Operative acute blood loss anemia  · improved    H/o DVT/PE  · cont eliquis 5mg bid     Bipolar/depression/anxiety  · Takes multiple medications including high dose ativan 2mg 4x daily  · Continue home regimen as ordered without interruption  · Psychiatry input appreciated recommend no changes in current meds including ativan  · stable     Chronic migraines  · F/b [facility] headache clinic and neurology  · Cont current regimen     Hyperlipidemia  · Low cholesterol diet  · Continue statin therapy    Hypoxia  · Pulse oximetry without good tracing  · Encourage incentive spirometry  · Monitor respiratory status     [place]'s disease  · No documentation regarding chronic steroids  · Verified patient does not take chronic steroids  · Doubt this is even a true diagnosis     Hypokalemia  · Continue supplementation  · Stable on current dose    Chronic dizziness/frequent falls  · No further episodes  · Awaiting placement     H/o gastric sleeve  · Cont PPI    Loose stools  · Cdiff negative  · cont imodium as needed  · improved      OK for dc to rehab from medicine standpoint     PRE-OP HGB LEVEL: 13 1  The above assessment and plan was reviewed and updated as determined by my evaluation of the patient on 4/5/2021      Labs:   Results from last 7 days   Lab Units 04/05/21  0538 04/02/21  1055   WBC Thousand/uL 9 30 12 81*   HEMOGLOBIN g/dL 11 4* 11 9   HEMATOCRIT % 35 7 37 5 PLATELETS Thousands/uL 343 299     Results from last 7 days   Lab Units 04/05/21  0538 04/02/21  0552   SODIUM mmol/L 140 140   POTASSIUM mmol/L 4 1 4 4   CHLORIDE mmol/L 108 109*   CO2 mmol/L 30 30   BUN mg/dL 13 13   CREATININE mg/dL 0 81 0 78   CALCIUM mg/dL 8 9 9 2             Results from last 7 days   Lab Units 03/30/21  1153   POC GLUCOSE mg/dl 101       Review of Scheduled Meds:  Current Facility-Administered Medications   Medication Dose Route Frequency Provider Last Rate    acetaminophen  975 mg Oral [facility] Nelida Mccain MD      apixaban  5 mg Oral BID Woody Karine, SIRISHA      ARIPiprazole  5 mg Oral Daily Woody Karine, SIRISHA      calcium carbonate  1,000 mg Oral Daily PRN Buxton Karine, SIRISHA      cholecalciferol  5,000 Units Oral Daily Woody Karine, SIRISHA      ferrous sulfate  325 mg Oral Daily With Breakfast Júnior Milton PA-C      fluvoxaMINE  300 mg Oral HS [place]xton Karine, SIRISHA      gabapentin  300 mg Oral TID Woody Karine, SIRISHA      lamoTRIgine  200 mg Oral Daily Woody Karine, SIRISHA      loperamide  2 mg Oral TID PRN MOON Wilkins      LORazepam  2 mg Oral 4x Daily Buxton Karine, SIRISHA      meclizine  12 5 mg Oral Q8H PRN Woody Karine, SIRISHA      methocarbamol  500 mg Oral Q6H [address] Nelida Mccain MD      nystatin  500,000 Units Swish & Swallow 4x Daily Emily Weller PA-C      ondansetron  4 mg Intravenous Q6H PRN Júnior Milton, SIRISHA      oxyCODONE  2 5 mg Oral Q4H PRN Nelida Mccain MD      oxyCODONE  5 mg Oral Q4H PRN Nelida Mccain MD      potassium chloride  40 mEq Oral Daily Júnior Milton PA-C         Subjective/ HPI: Bita Woodson seen and examined  Patient's overnight issues or events were reviewed with nursing or staff during rounds or morning huddle session  New or overnight issues include the following:     Pt without any overnight issues or concerns; no further diarrhea      ROS:   A 10 point ROS was performed; negative except as noted above  Imaging:     XR shoulder 2+ vw left   Final Result by Daniela Feldman MD (04/01 0289)      Unremarkable appearance of the left shoulder, status post joint replacement  Workstation performed: [id number]             *Labs /Radiology studies Reviewed  *Medications  reviewed and reconciled as needed  *Please refer to order section for additional ordered labs studies  *Case discussed with primary attending during morning huddle case rounds    Physical Examination:  Vitals:   Vitals:    04/04/21 0722 04/04/21 0900 04/04/21 1413 04/05/21 0651   BP: 126/76  127/78 104/81   Pulse: 88  93 90   Resp: 16  18 18   Temp: 98 1 °F (36 7 °C)  97 9 °F (36 6 °C) 98 1 °F (36 7 °C)   TempSrc:    Oral   SpO2: 95% 95% 95% 93%   Weight:       Height:           GEN: No apparent distress, interactive  NEURO: Alert and oriented x3  HEENT: Pupils are equal and reactive, EOMI, mucous membranes are moist, face symmetrical  CV: S1 S2 regular, no MRG, no peripheral edema noted  RESP: Lungs are clear bilaterally, no wheezes, rales or rhonchi noted, on room air, respirations easy and non labored  GI: Flat, soft non tender, non distended; +BS x4  : Voiding without difficulty  MUSC: Moves all extremities; except LUE with immobilizer in place  SKIN: pink, warm and dry, normal turgor, no rashes, lesions      The above physical exam was reviewed and updated as determined by my evaluation of the patient on 4/5/2021  Invasive Devices     Peripheral Intravenous Line            Peripheral IV 04/03/21 Dorsal (posterior); Right Forearm 2 days                   VTE Pharmacologic Prophylaxis: Sequential pneumatic compression stocking  Code Status: Level 1 - Full Code  Current Length of Stay: 6 day(s)      Total time spent:  30 minutes with more than 50% spent counseling/coordinating care  Counseling includes discussion with patient re: progress  and discussion with patient of his/her current medical state/information   Coordination of patient's care was performed in conjunction with primary service  Time invested included review of patient's labs, vitals, and management of their comorbidities with continued monitoring  In addition, this patient was discussed with medical team including physician and advanced extenders  The care of the patient was extensively discussed and appropriate treatment plan was formulated unique for this patient  ** Please Note:  voice to text software may have been used in the creation of this document   Although proof errors in transcription or interpretation are a potential of such software**

## 2021-04-05 NOTE — CASE MANAGEMENT
CM called [facility] to request prior auth for pt to transition to [facility]  CM spoke to Cat and the pending auth number is [id number]  CM faxed clinicals to [phone]  DAYANA notified Talisha Spencer from [address], physician and pt's nurse  CM will follow

## 2021-04-05 NOTE — PLAN OF CARE
Problem: PHYSICAL THERAPY ADULT  Goal: Performs mobility at highest level of function for planned discharge setting  See evaluation for individualized goals  Description: Treatment/Interventions: Functional transfer training, LE strengthening/ROM, Elevations, Therapeutic exercise, Endurance training, Patient/family training, Equipment eval/education, Bed mobility, Gait training, Spoke to nursing, OT  Equipment Recommended: (Continue to assess)       See flowsheet documentation for full assessment, interventions and recommendations  Outcome: Progressing  Note: Prognosis: Good  Problem List: Decreased strength, Decreased range of motion, Decreased endurance, Impaired balance, Decreased mobility, Decreased coordination, Decreased cognition, Impaired judgement, Decreased safety awareness, Orthopedic restrictions, Pain  Assessment: Pt continues to require min A with ambulatory tasks at this time, and trialed LRAD during sesison without LOB in iether case  Pt demonstrates slower pace & shortened stride with SPC during 2nd gait trial   pt practiced stair trial as noted above on  steps, focusing on sequencing & RUE placement  Pt reports she has unilateral rail on either entrance to home  Upon return to room, pt attempted standing pendulum exercises, but was unable to disassociate LUE movement from weight shifting despite visual demonstration & instructions  Pt assisted with donning & doffing sling throughout  Pt remained sitting EOB with MAN present at conclusion of session  At this time, discharge plan & POC remain appropriate to address noted deficits & ensure safe return to PLOF  Barriers to Discharge: Decreased caregiver support     PT Discharge Recommendation: Post-Acute Rehabilitation Services     PT - OK to Discharge: Yes    See flowsheet documentation for full assessment

## 2021-04-05 NOTE — PROGRESS NOTES
Orthopedics   Dakota Valenzuela 56 y o  female MRN: [id number]  Unit/Bed#: -01      Subjective:  56 y  o female post operative day 6 left total shoulder arthroplasty  Patient doing well  Pain controlled this morning  Upon entering room, patient used operative arm in sling to push to boost higher into reclining chair       Labs:  0   Lab Value Date/Time    HCT 37 5 04/02/2021 1055    HCT 40 5 03/31/2021 0630    HCT 40 1 03/17/2021 1421    HCT 38 1 07/07/2015 1604    HCT 33 9 (L) 07/03/2015 0537    HCT 33 3 (L) 07/01/2015 0516    HGB 11 9 04/02/2021 1055    HGB 12 5 03/31/2021 0630    HGB 13 1 03/17/2021 1421    HGB 12 3 07/07/2015 1604    HGB 10 9 (L) 07/03/2015 0537    HGB 10 8 (L) 07/01/2015 0516    INR 1 15 03/17/2021 1421    INR 0 90 05/07/2015 1030    WBC 12 81 (H) 04/02/2021 1055    WBC 15 42 (H) 03/31/2021 0630    WBC 9 61 03/17/2021 1421    WBC 7 66 07/07/2015 1604    WBC 8 47 07/03/2015 0537    WBC 8 02 07/01/2015 0516    ESR 24 (H) 10/05/2019 0548       Meds:    Current Facility-Administered Medications:     acetaminophen (TYLENOL) tablet 975 mg, 975 mg, Oral, Q8H [address], Jing Bullard MD, 975 mg at 04/05/21 0542    apixaban (ELIQUIS) tablet 5 mg, 5 mg, Oral, BID, Leesa Beatriz, PA-C, 5 mg at 04/04/21 1737    ARIPiprazole (ABILIFY) tablet 5 mg, 5 mg, Oral, Daily, Leesa Beatriz, PA-C, 5 mg at 04/04/21 0915    calcium carbonate (TUMS) chewable tablet 1,000 mg, 1,000 mg, Oral, Daily PRN, Leesa Beatriz, PA-C    cholecalciferol (VITAMIN D3) tablet 5,000 Units, 5,000 Units, Oral, Daily, Leesa Henderson PA-C, 5,000 Units at 04/04/21 8640    ferrous sulfate tablet 325 mg, 325 mg, Oral, Daily With Breakfast, Leesa Henderson PA-C, 325 mg at 04/04/21 0915    fluvoxaMINE (LUVOX) tablet 300 mg, 300 mg, Oral, HS, Leesa Beatriz, PA-C, 300 mg at 04/04/21 2209    gabapentin (NEURONTIN) capsule 300 mg, 300 mg, Oral, TID, Leesa Beatriz, PA-C, 300 mg at 04/04/21 2208    lamoTRIgine (LaMICtal) tablet 200 mg, 200 mg, Oral, Daily, Deborah Becerra PA-C, 200 mg at 04/04/21 7848    loperamide (IMODIUM) capsule 2 mg, 2 mg, Oral, TID PRN, MOON Hoang    LORazepam (ATIVAN) tablet 2 mg, 2 mg, Oral, 4x Daily, Deborah Becerra PA-C, 2 mg at 04/04/21 2207    meclizine (ANTIVERT) tablet 12 5 mg, 12 5 mg, Oral, Q8H PRN, Deborah Becerra PA-C, 12 5 mg at 04/02/21 0846    methocarbamol (ROBAXIN) tablet 500 mg, 500 mg, Oral, Q6H [address], Xena Cash MD, 500 mg at 04/05/21 0542    nystatin (MYCOSTATIN) oral suspension 500,000 Units, 500,000 Units, Swish & Swallow, 4x Daily, Emily Weller PA-C, 500,000 Units at 04/04/21 2208    ondansetron (ZOFRAN) injection 4 mg, 4 mg, Intravenous, Q6H PRN, Deborah Becerra PA-C, 4 mg at 04/04/21 1737    oxyCODONE (ROXICODONE) IR tablet 2 5 mg, 2 5 mg, Oral, Q4H PRN, Xena Cash MD    oxyCODONE (ROXICODONE) IR tablet 5 mg, 5 mg, Oral, Q4H PRN, Xena Cash MD, 5 mg at 04/05/21 0542    potassium chloride (K-DUR,KLOR-CON) CR tablet 40 mEq, 40 mEq, Oral, Daily, Deborah Becerra PA-C, 40 mEq at 04/04/21 0915    Ins and Outs:  I/O last 24 hours: In: 1020 [P O :1020]  Out: -       Physical:  Vitals:    04/04/21 1413   BP: 127/78   Pulse: 93   Resp: 18   Temp: 97 9 °F (36 6 °C)   SpO2: 95%     Gen: AAOx3; NAD  Skin: warm and dry  Lungs: no audible wheeze or stridor  left upper extremity  · Dressings clean dry intact  · Sensation intact to axillary, musculocutaneous, radial, ulna, median nerves  · Motor intact to axillary, musculocutaneous, radial, ulna, median nerves  · 2+ Radial pulse    Assessment: 56 y  o female post operative day 6 left total shoulder arthroplasty  Doing well    Plan:  · Nonweight Bearing left upper extremity  Cautioned patient on pushing out of chair with operative arm  Advised by continuing to do so could lead to dislocation and complication of implant    · Up and out of bed with assist  · Sling for Comfort, may remove for pendulums and hygiene  · DVT prophylaxis - mechanical  · Ice and analgesics  · Discharge - pending auth    Patient does express desire to go home   · Will continue to assess for acute blood loss anemia      Manjula Prince PA-C

## 2021-04-05 NOTE — OCCUPATIONAL THERAPY NOTE
Occupational Therapy Treatment Note     04/05/21 0940   OT Last Visit   OT Visit Date 04/05/21   Note Type   Note Type Treatment for insurance authorization   Restrictions/Precautions   Weight Bearing Precautions Per Order Yes   LUE Weight Bearing Per Order NWB   Braces or Orthoses Sling  (abd brace)   Lifestyle   [employer] PT WAS INDEPENDENT W/ ADLS/IADLS AT BASELINE  PT HAS DRIVERS LICENSE, HOWEVER, HAS NOT DRIVEN IN 6 MONTHS DUE TO SHOULDER  Reciprocal Relationships PT LIVES W/  WHO WORKS AS A  AND IS GONE DURING DAY/NIGHT, HOWEVER, PT REPORTS HE IS HOME EVERYDAY AROUND 4PM  PT REPORTS NO OTHER LOCAL FAMILY/FRIENDS IS ABLE TO ASSIST  PT REPORTS THAT HER DAUGHTER MAY BE ABLE TO STAY W/ HER FOR 1-2 WEEKS UPON D/C, FURTHER CLARIFICATION IS NEEDED  Service to Others PT IS RETIRED, PREVIOUSLY WORKED FOR THE POST OFFICE  Intrinsic Gratification PT ENJOYS READING AND WATCHING TV  Pain Assessment   Pain Assessment Tool 0-10   Pain Score No Pain   ADL   Where Assessed Edge of bed   Grooming Assistance 5  Supervision/Setup   Grooming Deficit Setup   UB Bathing Assistance 3  Moderate Assistance   UB Bathing Deficit Right arm;Left arm   LB Bathing Assistance 4  Minimal Assistance   LB Bathing Deficit Right lower leg including foot; Left lower leg including foot   UB Dressing Assistance 3  Moderate Assistance   UB Dressing Deficit Thread RUE; Thread LUE   LB Dressing Assistance 2  Maximal Assistance   LB Dressing Deficit Thread RLE into pants; Thread LLE into pants; Thread RLE into underwear; Thread LLE into underwear;Pull up over hips   LB Dressing Comments pt reported she requires back support to complete LB dressing tasks/she required increased assist 2* pt sitting edge of pt bed    Bed Mobility   Sit to Supine 5  Supervision   Additional items Assist x 1; Increased time required   Transfers   Sit to Stand 5  Supervision   Additional items Assist x 1   Stand to Sit 4  Minimal assistance   Additional items Assist x 1   Cognition   Overall Cognitive Status WFL   Arousal/Participation Alert; Cooperative   Attention Within functional limits   Orientation Level Oriented to person;Oriented to place; Disoriented to situation;Disoriented to time   Memory Within functional limits   Following Commands Follows one step commands without difficulty   Activity Tolerance   Activity Tolerance Patient tolerated treatment well  (despite pt report of nausea)   Assessment   Assessment Pt participated in occupational therapy with focus on activity tolerance, bed mob, unsupported sitting balance and tolerance for pt engagement in functional self-care task/UB and LB self-care  Pt cleared by Héctor Yeboah for pt participation in occupational therapy  Pt received sitting edge of pt bed and agreeable to therapy following pt Identifiers confirmed  Pt report feeling nauseous however agreeable to sit EOB to complete AM self-care  Pt required assist for one-handed dressing techniques after OT set up and instruction  Pt able to tolerate session well overall  She will however benefit from post acute rehab services to continue to address noted pt deficits with coordination, strength and activity tolerance which currently impair pt ADL and functional mob       Plan   Treatment Interventions ADL retraining;Functional transfer training   Goal Expiration Date 04/14/21   OT Treatment Day 1   OT Frequency 3-5x/wk   Recommendation   OT Discharge Recommendation Post-Acute Rehabilitation Services   AM-PAC Daily Activity Inpatient   Lower Body Dressing 2   Bathing 2   Toileting 2   Upper Body Dressing 2   Grooming 3   Eating 3   Daily Activity Raw Score 14   Daily Activity Standardized Score (Calc for Raw Score >=11) 33 39   AM-PAC Applied Cognition Inpatient   Following a Speech/Presentation 3   Understanding Ordinary Conversation 4   Taking Medications 4   Remembering Where Things Are Placed or Put Away 4   Remembering List of 4-5 Errands 3   Taking Care of Complicated Tasks 3   Applied Cognition Raw Score 21   Applied Cognition Standardized Score 44 3   Barthel Index   Feeding 10   Bathing 0   Grooming Score 0   Dressing Score 0   Bladder Score 10   Bowels Score 10   Toilet Use Score 5   Transfers (Bed/Chair) Score 10   Mobility (Level Surface) Score 0   Stairs Score 5   Barthel Index Score 50   Modified McDuffie Scale   Modified Neris Scale 4       Liam You  MAN/L

## 2021-04-05 NOTE — PLAN OF CARE
Problem: OCCUPATIONAL THERAPY ADULT  Goal: Performs self-care activities at highest level of function for planned discharge setting  See evaluation for individualized goals  Description: Treatment Interventions: ADL retraining, Functional transfer training, Endurance training, Patient/family training, Equipment evaluation/education, Compensatory technique education, Energy conservation          See flowsheet documentation for full assessment, interventions and recommendations  Outcome: Progressing  Note: Limitation: Decreased ADL status, Decreased endurance, Decreased self-care trans, Decreased high-level ADLs  Prognosis: Good  Assessment: Pt participated in occupational therapy with focus on activity tolerance, bed mob, unsupported sitting balance and tolerance for pt engagement in functional self-care task/UB and LB self-care  Pt cleared by Elie Michelle for pt participation in occupational therapy  Pt received sitting edge of pt bed and agreeable to therapy following pt Identifiers confirmed  Pt report feeling nauseous however agreeable to sit EOB to complete AM self-care  Pt required assist for one-handed dressing techniques after OT set up and instruction  Pt able to tolerate session well overall  She will however benefit from post acute rehab services to continue to address noted pt deficits with coordination, strength and activity tolerance which currently impair pt ADL and functional mob  OT Discharge Recommendation: Post-Acute Rehabilitation Services  OT - OK to Discharge:  Yes

## 2021-04-05 NOTE — PHYSICAL THERAPY NOTE
Physical Therapy Progress Note     04/05/21 0902   PT Last Visit   PT Visit Date 04/05/21   Note Type   Note Type Treatment   Pain Assessment   Pain Assessment Tool Pain Assessment not indicated - pt denies pain   Restrictions/Precautions   LUE Weight Bearing Per Order NWB   Braces or Orthoses Sling  (abd brace)   Other Precautions WBS;Pain; Fall Risk   Subjective   Subjective Pt encountered exiting bathroom with PCA, agreeable to treatment after getting nausea meds  Has no new complaitns with activity  Transfers   Sit to Stand 5  Supervision   Additional items Assist x 1   Stand to Sit 4  Minimal assistance   Additional items Assist x 1   Ambulation/Elevation   Gait pattern Excessively slow; Short stride; Shuffling;Decreased foot clearance; Improper Weight shift; Poor UE support   Gait Assistance 4  Minimal assist   Additional items Assist x 1   Assistive Device SPC; None   Distance 40' no AD, 100' with [facility]   Stair Management Assistance 4  Minimal assist   Additional items Assist x 1   Stair Management Technique One rail R;One rail L;Step to pattern; Foreward; Sideways   Number of Stairs 10  (x5 RHR, x5 LHR)   Balance   Static Sitting Fair   Static Standing Fair -   Ambulatory Poor +   Endurance Deficit   Endurance Deficit Yes   Endurance Deficit Description fatigue   Activity Tolerance   Activity Tolerance Patient tolerated treatment well   Nurse Cindy Angeles RN   Exercises   Pendulum Standing;10 reps;PROM; Left  (ant-post, lateral x 10 each)   Assessment   Prognosis Good   Problem List Decreased strength;Decreased range of motion;Decreased endurance; Impaired balance;Decreased mobility; Decreased coordination;Decreased cognition; Impaired judgement;Decreased safety awareness;Orthopedic restrictions;Pain   Assessment Pt continues to require min A with ambulatory tasks at this time, and trialed LRAD during sesison without LOB in iether case    Pt demonstrates slower pace & shortened stride with SPC during 2nd gait trial   pt practiced stair trial as noted above on  steps, focusing on sequencing & RUE placement  Pt reports she has unilateral rail on either entrance to home  Upon return to room, pt attempted standing pendulum exercises, but was unable to disassociate LUE movement from weight shifting despite visual demonstration & instructions  Pt assisted with donning & doffing sling throughout  Pt remained sitting EOB with MAN present at conclusion of session  At this time, discharge plan & POC remain appropriate to address noted deficits & ensure safe return to OF  Barriers to Discharge Decreased caregiver support   Goals   Patient Goals to be able to go home   STG Expiration Date 04/10/21   PT Treatment Day 3   Plan   Treatment/Interventions Functional transfer training;LE strengthening/ROM; Elevations; Therapeutic exercise; Endurance training;Patient/family training;Equipment eval/education; Bed mobility;Gait training   Progress Progressing toward goals   PT Frequency Other (Comment)  (3-6x/week)   Recommendation   PT Discharge Recommendation Post-Acute Rehabilitation Services   Equipment Recommended   (continue to trial 1 handed device)   AM-PAC Basic Mobility Inpatient   Turning in Bed Without Bedrails 3   Lying on Back to Sitting on Edge of Flat Bed 3   Moving Bed to Chair 3   Standing Up From Chair 3   Walk in Room 3   Climb 3-5 Stairs 3   Basic Mobility Inpatient Raw Score 18   Basic Mobility Standardized Score 41 05   The patient's AM-PAC Basic Mobility Inpatient Short Form Raw Score is 18, Standardized Score is 41 05  A standardized score less than 42 9 suggests the patient may benefit from discharge to post-acute rehabilitation services  Please also refer to the recommendation of the Physical Therapist for safe discharge planning            Roman Blake, PTA

## 2021-04-05 NOTE — CASE MANAGEMENT
Continuing to await auth approval for [facility] CM department to follow

## 2021-04-06 ENCOUNTER — HOSPITAL ENCOUNTER (INPATIENT)
Facility: HOSPITAL | Age: 61
LOS: 4 days | Discharge: HOME HEALTH | DRG: 560 | End: 2021-04-10
Attending: INTERNAL MEDICINE | Admitting: INTERNAL MEDICINE
Payer: COMMERCIAL

## 2021-04-06 VITALS
SYSTOLIC BLOOD PRESSURE: 112 MMHG | RESPIRATION RATE: 20 BRPM | OXYGEN SATURATION: 92 % | WEIGHT: 224 LBS | TEMPERATURE: 97.4 F | BODY MASS INDEX: 33.95 KG/M2 | HEIGHT: 68 IN | DIASTOLIC BLOOD PRESSURE: 75 MMHG | HEART RATE: 90 BPM

## 2021-04-06 DIAGNOSIS — M19.012 PRIMARY OSTEOARTHRITIS OF LEFT SHOULDER: ICD-10-CM

## 2021-04-06 DIAGNOSIS — Z47.89 AFTERCARE FOLLOWING SURGERY OF THE MUSCULOSKELETAL SYSTEM, NEC: Primary | ICD-10-CM

## 2021-04-06 LAB
ANION GAP SERPL CALCULATED.3IONS-SCNC: 5 MMOL/L (ref 4–13)
BASOPHILS # BLD AUTO: 0.06 THOUSANDS/ΜL (ref 0–0.1)
BASOPHILS NFR BLD AUTO: 1 % (ref 0–1)
BUN SERPL-MCNC: 13 MG/DL (ref 5–25)
CALCIUM SERPL-MCNC: 9 MG/DL (ref 8.3–10.1)
CHLORIDE SERPL-SCNC: 108 MMOL/L (ref 100–108)
CO2 SERPL-SCNC: 28 MMOL/L (ref 21–32)
CREAT SERPL-MCNC: 0.73 MG/DL (ref 0.6–1.3)
EOSINOPHIL # BLD AUTO: 0.48 THOUSAND/ΜL (ref 0–0.61)
EOSINOPHIL NFR BLD AUTO: 4 % (ref 0–6)
ERYTHROCYTE [DISTWIDTH] IN BLOOD BY AUTOMATED COUNT: 16 % (ref 11.6–15.1)
GFR SERPL CREATININE-BSD FRML MDRD: 89 ML/MIN/1.73SQ M
GLUCOSE SERPL-MCNC: 84 MG/DL (ref 65–140)
HCT VFR BLD AUTO: 35.2 % (ref 34.8–46.1)
HGB BLD-MCNC: 10.9 G/DL (ref 11.5–15.4)
IMM GRANULOCYTES # BLD AUTO: 0.11 THOUSAND/UL (ref 0–0.2)
IMM GRANULOCYTES NFR BLD AUTO: 1 % (ref 0–2)
LYMPHOCYTES # BLD AUTO: 1.85 THOUSANDS/ΜL (ref 0.6–4.47)
LYMPHOCYTES NFR BLD AUTO: 16 % (ref 14–44)
MCH RBC QN AUTO: 29.8 PG (ref 26.8–34.3)
MCHC RBC AUTO-ENTMCNC: 31 G/DL (ref 31.4–37.4)
MCV RBC AUTO: 96 FL (ref 82–98)
MONOCYTES # BLD AUTO: 1.32 THOUSAND/ΜL (ref 0.17–1.22)
MONOCYTES NFR BLD AUTO: 12 % (ref 4–12)
NEUTROPHILS # BLD AUTO: 7.58 THOUSANDS/ΜL (ref 1.85–7.62)
NEUTS SEG NFR BLD AUTO: 66 % (ref 43–75)
NRBC BLD AUTO-RTO: 0 /100 WBCS
PLATELET # BLD AUTO: 408 THOUSANDS/UL (ref 149–390)
PMV BLD AUTO: 9.8 FL (ref 8.9–12.7)
POTASSIUM SERPL-SCNC: 4.1 MMOL/L (ref 3.5–5.3)
RBC # BLD AUTO: 3.66 MILLION/UL (ref 3.81–5.12)
SODIUM SERPL-SCNC: 141 MMOL/L (ref 136–145)
WBC # BLD AUTO: 11.4 THOUSAND/UL (ref 4.31–10.16)

## 2021-04-06 PROCEDURE — 80048 BASIC METABOLIC PNL TOTAL CA: CPT | Performed by: ORTHOPAEDIC SURGERY

## 2021-04-06 PROCEDURE — 99306 1ST NF CARE HIGH MDM 50: CPT | Performed by: INTERNAL MEDICINE

## 2021-04-06 PROCEDURE — NC001 PR NO CHARGE: Performed by: ORTHOPAEDIC SURGERY

## 2021-04-06 PROCEDURE — 85025 COMPLETE CBC W/AUTO DIFF WBC: CPT | Performed by: ORTHOPAEDIC SURGERY

## 2021-04-06 RX ORDER — MELATONIN
2000 DAILY
Status: CANCELLED | OUTPATIENT
Start: 2021-04-07

## 2021-04-06 RX ORDER — OXYCODONE HYDROCHLORIDE 5 MG/1
2.5 TABLET ORAL EVERY 4 HOURS PRN
Status: DISCONTINUED | OUTPATIENT
Start: 2021-04-06 | End: 2021-04-06

## 2021-04-06 RX ORDER — GABAPENTIN 300 MG/1
300 CAPSULE ORAL 3 TIMES DAILY
Status: DISCONTINUED | OUTPATIENT
Start: 2021-04-06 | End: 2021-04-10 | Stop reason: HOSPADM

## 2021-04-06 RX ORDER — BISACODYL 10 MG
10 SUPPOSITORY, RECTAL RECTAL DAILY PRN
Status: DISCONTINUED | OUTPATIENT
Start: 2021-04-06 | End: 2021-04-10 | Stop reason: HOSPADM

## 2021-04-06 RX ORDER — ACETAMINOPHEN 325 MG/1
975 TABLET ORAL EVERY 8 HOURS SCHEDULED
Status: CANCELLED | OUTPATIENT
Start: 2021-04-06

## 2021-04-06 RX ORDER — FLUVOXAMINE MALEATE 50 MG/1
300 TABLET, COATED ORAL
Status: DISCONTINUED | OUTPATIENT
Start: 2021-04-06 | End: 2021-04-10 | Stop reason: HOSPADM

## 2021-04-06 RX ORDER — LOPERAMIDE HYDROCHLORIDE 2 MG/1
2 CAPSULE ORAL 3 TIMES DAILY PRN
Status: DISCONTINUED | OUTPATIENT
Start: 2021-04-06 | End: 2021-04-10 | Stop reason: HOSPADM

## 2021-04-06 RX ORDER — ARIPIPRAZOLE 5 MG/1
5 TABLET ORAL DAILY
Status: CANCELLED | OUTPATIENT
Start: 2021-04-07

## 2021-04-06 RX ORDER — GABAPENTIN 300 MG/1
300 CAPSULE ORAL 3 TIMES DAILY
Status: CANCELLED | OUTPATIENT
Start: 2021-04-06

## 2021-04-06 RX ORDER — ONDANSETRON 2 MG/ML
4 INJECTION INTRAMUSCULAR; INTRAVENOUS EVERY 6 HOURS PRN
Status: CANCELLED | OUTPATIENT
Start: 2021-04-06

## 2021-04-06 RX ORDER — CALCIUM CARBONATE 200(500)MG
1000 TABLET,CHEWABLE ORAL DAILY PRN
Status: CANCELLED | OUTPATIENT
Start: 2021-04-06

## 2021-04-06 RX ORDER — OXYCODONE HYDROCHLORIDE 5 MG/1
5 TABLET ORAL EVERY 4 HOURS PRN
Status: DISCONTINUED | OUTPATIENT
Start: 2021-04-06 | End: 2021-04-10 | Stop reason: HOSPADM

## 2021-04-06 RX ORDER — LORAZEPAM 1 MG/1
2 TABLET ORAL 4 TIMES DAILY
Status: CANCELLED | OUTPATIENT
Start: 2021-04-06

## 2021-04-06 RX ORDER — OXYCODONE HYDROCHLORIDE 5 MG/1
5 TABLET ORAL EVERY 4 HOURS PRN
Status: CANCELLED | OUTPATIENT
Start: 2021-04-06

## 2021-04-06 RX ORDER — LOPERAMIDE HYDROCHLORIDE 2 MG/1
2 CAPSULE ORAL 3 TIMES DAILY PRN
Status: CANCELLED | OUTPATIENT
Start: 2021-04-06

## 2021-04-06 RX ORDER — FLUVOXAMINE MALEATE 50 MG/1
300 TABLET, COATED ORAL
Status: CANCELLED | OUTPATIENT
Start: 2021-04-06

## 2021-04-06 RX ORDER — MECLIZINE HCL 12.5 MG/1
12.5 TABLET ORAL EVERY 8 HOURS PRN
Status: DISCONTINUED | OUTPATIENT
Start: 2021-04-06 | End: 2021-04-10 | Stop reason: HOSPADM

## 2021-04-06 RX ORDER — AMOXICILLIN 250 MG
1 CAPSULE ORAL
Status: DISCONTINUED | OUTPATIENT
Start: 2021-04-06 | End: 2021-04-10 | Stop reason: HOSPADM

## 2021-04-06 RX ORDER — LORAZEPAM 1 MG/1
2 TABLET ORAL 2 TIMES DAILY
Status: DISCONTINUED | OUTPATIENT
Start: 2021-04-06 | End: 2021-04-10 | Stop reason: HOSPADM

## 2021-04-06 RX ORDER — OXYCODONE HYDROCHLORIDE 5 MG/1
2.5 TABLET ORAL EVERY 4 HOURS PRN
Status: CANCELLED | OUTPATIENT
Start: 2021-04-06

## 2021-04-06 RX ORDER — MECLIZINE HCL 12.5 MG/1
12.5 TABLET ORAL EVERY 8 HOURS PRN
Status: CANCELLED | OUTPATIENT
Start: 2021-04-06

## 2021-04-06 RX ORDER — METHOCARBAMOL 500 MG/1
500 TABLET, FILM COATED ORAL EVERY 6 HOURS SCHEDULED
Status: CANCELLED | OUTPATIENT
Start: 2021-04-06

## 2021-04-06 RX ORDER — METHOCARBAMOL 500 MG/1
500 TABLET, FILM COATED ORAL EVERY 6 HOURS SCHEDULED
Status: DISCONTINUED | OUTPATIENT
Start: 2021-04-06 | End: 2021-04-10 | Stop reason: HOSPADM

## 2021-04-06 RX ORDER — LAMOTRIGINE 100 MG/1
200 TABLET ORAL DAILY
Status: DISCONTINUED | OUTPATIENT
Start: 2021-04-07 | End: 2021-04-10 | Stop reason: HOSPADM

## 2021-04-06 RX ORDER — OXYCODONE HYDROCHLORIDE 5 MG/1
2.5 TABLET ORAL EVERY 6 HOURS PRN
Status: DISCONTINUED | OUTPATIENT
Start: 2021-04-06 | End: 2021-04-10 | Stop reason: HOSPADM

## 2021-04-06 RX ORDER — POTASSIUM CHLORIDE 20 MEQ/1
40 TABLET, EXTENDED RELEASE ORAL DAILY
Status: DISCONTINUED | OUTPATIENT
Start: 2021-04-07 | End: 2021-04-10 | Stop reason: HOSPADM

## 2021-04-06 RX ORDER — LORAZEPAM 1 MG/1
2 TABLET ORAL 4 TIMES DAILY
Status: DISCONTINUED | OUTPATIENT
Start: 2021-04-06 | End: 2021-04-06

## 2021-04-06 RX ORDER — CALCIUM CARBONATE 200(500)MG
1000 TABLET,CHEWABLE ORAL DAILY PRN
Status: DISCONTINUED | OUTPATIENT
Start: 2021-04-06 | End: 2021-04-10 | Stop reason: HOSPADM

## 2021-04-06 RX ORDER — LAMOTRIGINE 100 MG/1
200 TABLET ORAL DAILY
Status: CANCELLED | OUTPATIENT
Start: 2021-04-07

## 2021-04-06 RX ORDER — ONDANSETRON 2 MG/ML
4 INJECTION INTRAMUSCULAR; INTRAVENOUS EVERY 6 HOURS PRN
Status: DISCONTINUED | OUTPATIENT
Start: 2021-04-06 | End: 2021-04-07

## 2021-04-06 RX ORDER — MELATONIN
2000 DAILY
Status: DISCONTINUED | OUTPATIENT
Start: 2021-04-07 | End: 2021-04-10 | Stop reason: HOSPADM

## 2021-04-06 RX ORDER — ARIPIPRAZOLE 5 MG/1
5 TABLET ORAL DAILY
Status: DISCONTINUED | OUTPATIENT
Start: 2021-04-07 | End: 2021-04-10 | Stop reason: HOSPADM

## 2021-04-06 RX ORDER — POTASSIUM CHLORIDE 20 MEQ/1
40 TABLET, EXTENDED RELEASE ORAL DAILY
Status: CANCELLED | OUTPATIENT
Start: 2021-04-07

## 2021-04-06 RX ORDER — ACETAMINOPHEN 325 MG/1
975 TABLET ORAL EVERY 8 HOURS SCHEDULED
Status: DISCONTINUED | OUTPATIENT
Start: 2021-04-06 | End: 2021-04-10 | Stop reason: HOSPADM

## 2021-04-06 RX ADMIN — LORAZEPAM 2 MG: 1 TABLET ORAL at 12:00

## 2021-04-06 RX ADMIN — FLUVOXAMINE MALEATE 300 MG: 50 TABLET ORAL at 22:23

## 2021-04-06 RX ADMIN — ACETAMINOPHEN 975 MG: 325 TABLET, FILM COATED ORAL at 06:04

## 2021-04-06 RX ADMIN — METHOCARBAMOL 500 MG: 500 TABLET, FILM COATED ORAL at 00:35

## 2021-04-06 RX ADMIN — Medication 5000 UNITS: at 08:11

## 2021-04-06 RX ADMIN — METHOCARBAMOL 500 MG: 500 TABLET, FILM COATED ORAL at 17:21

## 2021-04-06 RX ADMIN — OXYCODONE HYDROCHLORIDE 5 MG: 5 TABLET ORAL at 20:30

## 2021-04-06 RX ADMIN — OXYCODONE HYDROCHLORIDE 5 MG: 5 TABLET ORAL at 16:11

## 2021-04-06 RX ADMIN — LAMOTRIGINE 200 MG: 100 TABLET ORAL at 08:11

## 2021-04-06 RX ADMIN — NYSTATIN 500000 UNITS: 100000 SUSPENSION ORAL at 17:21

## 2021-04-06 RX ADMIN — POTASSIUM CHLORIDE 40 MEQ: 1500 TABLET, EXTENDED RELEASE ORAL at 08:11

## 2021-04-06 RX ADMIN — NYSTATIN 500000 UNITS: 100000 SUSPENSION ORAL at 22:25

## 2021-04-06 RX ADMIN — GABAPENTIN 300 MG: 300 CAPSULE ORAL at 15:32

## 2021-04-06 RX ADMIN — NYSTATIN 500000 UNITS: 100000 SUSPENSION ORAL at 08:12

## 2021-04-06 RX ADMIN — DOCUSATE SODIUM 50 MG AND SENNOSIDES 8.6 MG 1 TABLET: 8.6; 5 TABLET, FILM COATED ORAL at 22:25

## 2021-04-06 RX ADMIN — APIXABAN 5 MG: 5 TABLET, FILM COATED ORAL at 08:12

## 2021-04-06 RX ADMIN — METHOCARBAMOL 500 MG: 500 TABLET, FILM COATED ORAL at 12:00

## 2021-04-06 RX ADMIN — TUBERCULIN PURIFIED PROTEIN DERIVATIVE 5 UNITS: 5 INJECTION INTRADERMAL at 13:32

## 2021-04-06 RX ADMIN — ACETAMINOPHEN 975 MG: 325 TABLET ORAL at 22:24

## 2021-04-06 RX ADMIN — NYSTATIN 500000 UNITS: 100000 SUSPENSION ORAL at 12:01

## 2021-04-06 RX ADMIN — APIXABAN 5 MG: 5 TABLET, FILM COATED ORAL at 17:21

## 2021-04-06 RX ADMIN — LORAZEPAM 2 MG: 1 TABLET ORAL at 08:11

## 2021-04-06 RX ADMIN — OXYCODONE HYDROCHLORIDE 5 MG: 5 TABLET ORAL at 12:00

## 2021-04-06 RX ADMIN — METHOCARBAMOL 500 MG: 500 TABLET, FILM COATED ORAL at 06:04

## 2021-04-06 RX ADMIN — OXYCODONE HYDROCHLORIDE 5 MG: 5 TABLET ORAL at 03:47

## 2021-04-06 RX ADMIN — ARIPIPRAZOLE 5 MG: 5 TABLET ORAL at 08:12

## 2021-04-06 RX ADMIN — GABAPENTIN 300 MG: 300 CAPSULE ORAL at 08:11

## 2021-04-06 RX ADMIN — GABAPENTIN 300 MG: 300 CAPSULE ORAL at 20:30

## 2021-04-06 RX ADMIN — ACETAMINOPHEN 975 MG: 325 TABLET ORAL at 15:35

## 2021-04-06 RX ADMIN — FERROUS SULFATE TAB 325 MG (65 MG ELEMENTAL FE) 325 MG: 325 (65 FE) TAB at 08:12

## 2021-04-06 RX ADMIN — OXYCODONE HYDROCHLORIDE 5 MG: 5 TABLET ORAL at 08:12

## 2021-04-06 NOTE — CASE MANAGEMENT
CM received voicemail from [facility] with [facility] that Mabel Ritter was approved  Auth# [id number] approved through 4/9  Review can be faxed to F: [phone]; P: [phone]  CM informed  TCF of same via [facility]  CM spoke with Cynthia Gore at [place] to schedule [facility] transportation with a pickup time of 12:00 PM  CM informed  TCF, Ortho resident Dave Martines RN, and pt of transportation time  Pt aware and agreeable to out of pocket cost associated       Report can be called to P: [phone]; F: [phone]

## 2021-04-06 NOTE — PROGRESS NOTES
Internal Medicine Progress Note  Patient: Nena Garcia  Age/sex: 56 y o  female  Medical Record #: [id number]      ASSESSMENT/PLAN: (Interval History)  Nena Garcia is seen and examined and management for following issues:    Status Post left Total SHOULDER ARTHROPLASTY   Pain somewhat controlled   Continue encourage incentive spirometry; monitor fever curve   DVT prophylaxis in place and reviewed  Results from last 7 days   Lab Units 04/06/21  0539   WBC Thousand/uL 11 40*   HEMOGLOBIN g/dL 10 9*   HEMATOCRIT % 35 2   PLATELETS Thousands/uL 408*   continues to be non compliant with restrictions at times      Operative acute blood loss anemia  · improving    H/o DVT/PE  · cont eliquis 5mg bid     Bipolar/depression/anxiety  · Takes multiple medications including high dose ativan 2mg 4x daily  · Continue home regimen as ordered without interruption  · Psychiatry input appreciated recommend no changes in current meds including ativan  · stable     Chronic migraines  · F/b [facility]  · Cont current regimen     Hyperlipidemia  · Low cholesterol diet  · Continue statin therapy    Hypoxia  · Pulse oximetry without good tracing  · Encourage incentive spirometry  · Monitor respiratory status     [place]'s disease  · No documentation regarding chronic steroids  · Verified patient does not take chronic steroids  · Doubt this is even a true diagnosis     Hypokalemia  · Continue supplementation  · Stable on current dose    Chronic dizziness/frequent falls  · No further episodes  · Awaiting placement     H/o gastric sleeve  · Cont PPI    Loose stools  · Cdiff negative  · cont imodium as needed  · improved      Awaiting authorization     PRE-OP HGB LEVEL: 13 1  The above assessment and plan was reviewed and updated as determined by my evaluation of the patient on 4/6/2021      Labs:   Results from last 7 days   Lab Units 04/06/21  0539 04/05/21  0538   WBC Thousand/uL 11 40* 9 30 HEMOGLOBIN g/dL 10 9* 11 4*   HEMATOCRIT % 35 2 35 7   PLATELETS Thousands/uL 408* 343     Results from last 7 days   Lab Units 04/06/21  0539 04/05/21  0538   SODIUM mmol/L 141 140   POTASSIUM mmol/L 4 1 4 1   CHLORIDE mmol/L 108 108   CO2 mmol/L 28 30   BUN mg/dL 13 13   CREATININE mg/dL 0 73 0 81   CALCIUM mg/dL 9 0 8 9             Results from last 7 days   Lab Units 03/30/21  1153   POC GLUCOSE mg/dl 101       Review of Scheduled Meds:  Current Facility-Administered Medications   Medication Dose Route Frequency Provider Last Rate    acetaminophen  975 mg Oral [facility] Luke Lawton MD      apixaban  5 mg Oral BID Larry Robert PA-C      ARIPiprazole  5 mg Oral Daily Larry Robert PA-C      calcium carbonate  1,000 mg Oral Daily PRN Larry Robert PA-C      cholecalciferol  5,000 Units Oral Daily Larry Robert PA-C      ferrous sulfate  325 mg Oral Daily With Breakfast Larry Robert PA-C      fluvoxaMINE  300 mg Oral HS Larry Robert PA-C      gabapentin  300 mg Oral TID Larry Robert PA-C      lamoTRIgine  200 mg Oral Daily Larry Robert PA-C      loperamide  2 mg Oral TID PRN MOON Triana      LORazepam  2 mg Oral 4x Daily Larry Robert PA-C      meclizine  12 5 mg Oral Q8H PRN Larry Robert PA-C      methocarbamol  500 mg Oral Q6H [address] Luke Lawton MD      nystatin  500,000 Units Swish & Swallow 4x Daily Emily Weller PA-C      ondansetron  4 mg Intravenous Q6H PRN Larry Robert PA-C      oxyCODONE  2 5 mg Oral Q4H PRN Luke Lawton MD      oxyCODONE  5 mg Oral Q4H PRN Luke Lawton MD      potassium chloride  40 mEq Oral Daily Larry Robert PA-C         Subjective/ HPI: Tiff Ramachandran seen and examined  Patient's overnight issues or events were reviewed with nursing or staff during rounds or morning huddle session   New or overnight issues include the following:     Pt without any overnight issues or concerns other than some pain in shoulder      ROS:   A 10 point ROS was performed; negative except as noted above  Imaging:     XR shoulder 2+ vw left   Final Result by Jelani Arzate MD (04/01 3697)      Unremarkable appearance of the left shoulder, status post joint replacement  Workstation performed: [id number]             *Labs /Radiology studies Reviewed  *Medications  reviewed and reconciled as needed  *Please refer to order section for additional ordered labs studies  *Case discussed with primary attending during morning huddle case rounds    Physical Examination:  Vitals:   Vitals:    04/05/21 0651 04/05/21 1530 04/05/21 2322 04/06/21 0650   BP: 104/81 120/66 111/75 112/75   Pulse: 90 95 88 90   Resp: 18 18 18 20   Temp: 98 1 °F (36 7 °C) 98 °F (36 7 °C) (!) 97 4 °F (36 3 °C)    TempSrc: Oral Oral     SpO2: 93% 94% 96% 92%   Weight:       Height:           GEN: No apparent distress, interactive  NEURO: Alert and oriented x3  HEENT: Pupils are equal and reactive, EOMI, mucous membranes are moist, face symmetrical  CV: S1 S2 regular, no MRG, no peripheral edema noted  RESP: Lungs are clear bilaterally, no wheezes, rales or rhonchi noted, on room air, respirations easy and non labored  GI: Flat, soft non tender, non distended; +BS x4  : Voiding without difficulty  MUSC: Moves all extremities; except LUE immobilizer in place  SKIN: pink, warm and dry, normal turgor, no rashes, lesions      The above physical exam was reviewed and updated as determined by my evaluation of the patient on 4/6/2021  Invasive Devices     Peripheral Intravenous Line            Peripheral IV 04/03/21 Dorsal (posterior); Right Forearm 3 days                   VTE Pharmacologic Prophylaxis: Eliquis  Code Status: Level 1 - Full Code  Current Length of Stay: 7 day(s)      Total time spent:  30 minutes with more than 50% spent counseling/coordinating care   Counseling includes discussion with patient re: progress  and discussion with patient of his/her current medical state/information  Coordination of patient's care was performed in conjunction with primary service  Time invested included review of patient's labs, vitals, and management of their comorbidities with continued monitoring  In addition, this patient was discussed with medical team including physician and advanced extenders  The care of the patient was extensively discussed and appropriate treatment plan was formulated unique for this patient  ** Please Note:  voice to text software may have been used in the creation of this document   Although proof errors in transcription or interpretation are a potential of such software**

## 2021-04-06 NOTE — DISCHARGE SUMMARY
ORTHOPEDICS DISCHARGE SUMMARY  Michael Voss 56 y o  female MRN: [id number]  Unit/Bed#: -01    Attending Physician: Michelle Das    Admitting diagnosis: Primary osteoarthritis of left shoulder [M19 012]    Discharge diagnosis: Primary osteoarthritis of left shoulder [M19 012]    Date of admission: 3/30/2021    Date of discharge: 04/06/21         Procedure: Left total shoulder arthropalsty    HPI:  This is a 56y o  year old female that presented to the office with signs and symptoms of left shoulder osteoarthritis and/or other pathology  They tried and failed conservative treatment measures and wished to proceed with surgical intervention  The risks, benefits, and complications of the procedure were discussed with the patient and informed consent was obtained  Hospital Course: The patient was admitted to the hospital on 3/30/2021 and underwent an uncomplicated left total shoulder arthroplasty  They were transferred to the floor after a brief stay in the post-anesthesia care unit  Their pain was well managed with IV and oral pain medications  Patient required additional inpatient stay due to issues with balance and PT/OT recommendations for rehab placement on discharge  This was in an attempt to prevent patient falling at home as she has had multiple recent falls requiring emergency department visits in past 12 months  On discharge date pt was cleared by PT and the medicine team for discharge to [facility] to work on balance and ADL's before discharge to home  It was noted that if she went home she would be left alone for a significant portion of the day as family could not provide around the clock support      0   Lab Value Date/Time    HGB 10 9 (L) 04/06/2021 0539    HGB 11 4 (L) 04/05/2021 0538    HGB 11 9 04/02/2021 1055    HGB 12 5 03/31/2021 0630    HGB 13 1 03/17/2021 1421    HGB 10 0 (L) 07/06/2020 0513    HGB 10 1 (L) 07/05/2020 0532    HGB 11 6 07/04/2020 1249    HGB 10 8 (L) 07/04/2020 8923 HGB 11 8 07/03/2020 1653    HGB 13 6 06/27/2020 1400    HGB 11 6 10/16/2019 0448    HGB 12 2 10/15/2019 1800    HGB 11 4 (L) 10/03/2019 0542    HGB 12 3 10/02/2019 1510    HGB 10 2 (L) 07/08/2019 0636    HGB 9 3 (L) 07/06/2019 1154    HGB 9 7 (L) 06/05/2019 0630    HGB 9 7 (L) 06/03/2019 0546    HGB 9 9 (L) 06/02/2019 0558    HGB 10 2 (L) 05/31/2019 1436    HGB 8 9 (L) 04/12/2019 0608    HGB 8 9 (L) 04/11/2019 1244    HGB 8 7 (L) 04/11/2019 0557    HGB 10 9 (L) 04/07/2019 0858    HGB 9 2 (L) 04/06/2019 0556    HGB 10 0 (L) 04/05/2019 1328    HGB 10 1 (L) 08/12/2018 0823    HGB 9 5 (L) 07/22/2018 1709    HGB 10 5 (L) 07/09/2018 1157    HGB 9 9 (L) 06/23/2018 0403    HGB 10 6 (L) 06/22/2018 1756    HGB 11 5 01/23/2018 1843    HGB 11 7 01/04/2018 1018    HGB 10 9 (L) 12/21/2017 1822    HGB 12 4 10/08/2017 0740    HGB 13 4 08/09/2017 1023    HGB 11 1 (L) 08/03/2017 0556    HGB 13 3 07/29/2017 1627    HGB 12 6 06/04/2017 1309    HGB 12 7 06/03/2017 1218    HGB 12 1 05/29/2017 0442    HGB 12 9 05/28/2017 1057    HGB 13 8 11/12/2016 1222    HGB 14 7 09/11/2016 1134    HGB 14 3 07/21/2016 1105    HGB 13 7 06/23/2016 1034    HGB 12 3 07/07/2015 1604    HGB 10 9 (L) 07/03/2015 0537    HGB 10 8 (L) 07/01/2015 0516    HGB 11 7 06/30/2015 0440    HGB 13 2 06/29/2015 1155    HGB 12 0 05/08/2015 0450    HGB 13 1 05/07/2015 1030    HGB 12 9 04/04/2015 1510    HGB 13 3 11/12/2014 1315    HGB 12 7 11/06/2014 1635    HGB 12 6 10/07/2014 1709    HGB 12 0 10/01/2014 0616    HGB 12 8 09/30/2014 1430    HGB 13 0 09/13/2014 0525    HGB 13 9 09/12/2014 1355    HGB 12 2 08/15/2014 1446    HGB 12 7 07/30/2014 0415    HGB 13 7 07/29/2014 1155    HGB 12 7 07/08/2014 1936    HGB 11 9 06/29/2014 0645    HGB 12 8 06/29/2014 0013    HGB 11 9 06/18/2014 2207    HGB 11 1 (L) 05/25/2014 0530    HGB 10 8 (L) 05/24/2014 0450    HGB 11 5 05/23/2014 1850    HGB 12 5 02/22/2014 1918       Greater than 2 gram drop which qualifies for diagnosis of acute blood loss anemia  Vital signs remained stable and pt was resuscitated with IVF as needed   Body mass index is 34 06 kg/m²  mildly obese  Recommend behavior modifications, nutrition and physical activity  Discharge Instructions: The patient was discharged nonweight bearing to the left upper extremity in shoulder abduction brace  Take pain medications as instructed  PT/OT as per postoperative instructions  Discharge Medications: For the complete list of discharge medications, please refer to the patient's medication reconciliation

## 2021-04-06 NOTE — UTILIZATION REVIEW
Elective Surgical Continued Stay Review    Date: 4/4    POD#: 5  Current Patient Class: Inpatient Current Level of Care: Med Surg    Assessment/Plan: 56 y o  female, initial surgery date 3/30  POD #5 Left total shoulder arthroplasty    4/3 - Severe watery stools yesterday AM and C diff test ordered, which was negative  She states her stools are still loose but per nursing she hasn't had any today  Also patient admits she has taken her sling off multiple times overnight and nursing confirms this  Reiterated importance of keeping the sling  Pain control    4/4 - NWB LUE in sling  Pain control  Hgb trending down, continue to monitor  Awaiting placement  4/5 - Noted this am patient used operative arm in sling to push to boost higher into reclining chair  Pt cautioned on pushing out of chair with operative arm  Pain control  NWB LUE   Awaiting Insurance auth for rehab placement at [address] [place] [address]      4/6 - Discharge to [address] [place] [address] today    Pertinent Labs/Diagnostic Results:   Results from last 7 days   Lab Units 04/05/21  1107   SARS-COV-2  Negative     Results from last 7 days   Lab Units 04/06/21  0539 04/05/21  0538 04/02/21  1055 03/31/21  0630   WBC Thousand/uL 11 40* 9 30 12 81* 15 42*   HEMOGLOBIN g/dL 10 9* 11 4* 11 9 12 5   HEMATOCRIT % 35 2 35 7 37 5 40 5   PLATELETS Thousands/uL 408* 343 299 271   NEUTROS ABS Thousands/µL 7 58 5 66 8 63*  --          Results from last 7 days   Lab Units 04/06/21  0539 04/05/21  0538 04/02/21  0552 04/01/21  0509 03/31/21  0507   SODIUM mmol/L 141 140 140 140 139   POTASSIUM mmol/L 4 1 4 1 4 4 4 9 4 7   CHLORIDE mmol/L 108 108 109* 108 112*   CO2 mmol/L 28 30 30 29 21   ANION GAP mmol/L 5 2* 1* 3* 6   BUN mg/dL 13 13 13 18 16   CREATININE mg/dL 0 73 0 81 0 78 0 95 1 10   EGFR ml/min/1 73sq m 89 78 82 64 54   CALCIUM mg/dL 9 0 8 9 9 2 9 2 9 2             Results from last 7 days   Lab Units 04/06/21  0539 04/05/21  0538 04/02/21  0552 04/01/21  0509 03/31/21  0507   GLUCOSE RANDOM mg/dL 84 80 85 82 108       Results from last 7 days   Lab Units 04/05/21  1107   INFLUENZA A PCR  Negative   INFLUENZA B PCR  Negative   RSV PCR  Negative                 Results from last 7 days   Lab Units 04/02/21  1237   C DIFF TOXIN B BY PCR  Negative     Vital Signs:   04/04/21 1413  97 9 °F (36 6 °C)  93  18  127/78  94  95 %  --   04/04/21 0900  --  --  --  --  --  95 %  None (Room air)       Medications:   Scheduled Medications:    Facility-Administered Medications Ordered in Other Encounters   Medication Dose Route Frequency Last Rate    acetaminophen  975 mg Oral Q8H [facility]      apixaban  5 mg Oral BID      [START ON 4/7/2021] ARIPiprazole  5 mg Oral Daily      calcium carbonate  1,000 mg Oral Daily PRN      [START ON 4/7/2021] cholecalciferol  2,000 Units Oral Daily      fluvoxaMINE  300 mg Oral HS      gabapentin  300 mg Oral TID      [START ON 4/7/2021] lamoTRIgine  200 mg Oral Daily      loperamide  2 mg Oral TID PRN      LORazepam  2 mg Oral 4x Daily      meclizine  12 5 mg Oral Q8H PRN      methocarbamol  500 mg Oral Q6H [facility]      nystatin  500,000 Units Swish & Swallow 4x Daily      ondansetron  4 mg Intravenous Q6H PRN  4/4 x2    oxyCODONE  2 5 mg Oral Q4H PRN      oxyCODONE  5 mg Oral Q4H PRN  4/4 x4    [START ON 4/7/2021] potassium chloride  40 mEq Oral Daily      tuberculin  5 Units Intradermal Once         Discharge Plan: See above    Network Utilization Review Department  ATTENTION: Please call with any questions or concerns to [phone] and carefully listen to the prompts so that you are directed to the right person  All voicemails are confidential   Farrel Bodily all requests for admission clinical reviews, approved or denied determinations and any other requests to dedicated fax number below belonging to the campus where the patient is receiving treatment   List of dedicated fax numbers for the Facilities:  FACILITY NAME UR FAX NUMBER   ADMISSION DENIALS ([facility]) [phone]   [address] (Maternity/NICU/Pediatrics) 261 [facility],[address] 40 125 [facility]  [phone]   Tevin Kinc[address] ([address]) [address] [place] [address] [facility] [phone]   [place] [id number] [phone]

## 2021-04-06 NOTE — PLAN OF CARE
Problem: Potential for Falls  Goal: Patient will remain free of falls  Description: INTERVENTIONS:  - Assess patient frequently for physical needs  -  Identify cognitive and physical deficits and behaviors that affect risk of falls    -  [place] fall precautions as indicated by assessment   - Educate patient/family on patient safety including physical limitations  - Instruct patient to call for assistance with activity based on assessment  - Modify environment to reduce risk of injury  - Consider OT/PT consult to assist with strengthening/mobility  Outcome: Adequate for Discharge     Problem: PAIN - ADULT  Goal: Verbalizes/displays adequate comfort level or baseline comfort level  Description: Interventions:  - Encourage patient to monitor pain and request assistance  - Assess pain using appropriate pain scale  - Administer analgesics based on type and severity of pain and evaluate response  - Implement non-pharmacological measures as appropriate and evaluate response  - Consider cultural and social influences on pain and pain management  - Notify physician/advanced practitioner if interventions unsuccessful or patient reports new pain  Outcome: Adequate for Discharge     Problem: INFECTION - ADULT  Goal: Absence or prevention of progression during hospitalization  Description: INTERVENTIONS:  - Assess and monitor for signs and symptoms of infection  - Monitor lab/diagnostic results  - Monitor all insertion sites, i e  indwelling lines, tubes, and drains  - Monitor endotracheal if appropriate and nasal secretions for changes in amount and color  - [place] appropriate cooling/warming therapies per order  - Administer medications as ordered  - Instruct and encourage patient and family to use good hand hygiene technique  - Identify and instruct in appropriate isolation precautions for identified infection/condition  Outcome: Adequate for Discharge  Goal: Absence of fever/infection during neutropenic period  Description: INTERVENTIONS:  - Monitor WBC    Outcome: Adequate for Discharge     Problem: SAFETY ADULT  Goal: Patient will remain free of falls  Description: INTERVENTIONS:  - Assess patient frequently for physical needs  -  Identify cognitive and physical deficits and behaviors that affect risk of falls    -  [place] fall precautions as indicated by assessment   - Educate patient/family on patient safety including physical limitations  - Instruct patient to call for assistance with activity based on assessment  - Modify environment to reduce risk of injury  - Consider OT/PT consult to assist with strengthening/mobility  Outcome: Adequate for Discharge  Goal: Maintain or return to baseline ADL function  Description: INTERVENTIONS:  -  Assess patient's ability to carry out ADLs; assess patient's baseline for ADL function and identify physical deficits which impact ability to perform ADLs (bathing, care of mouth/teeth, toileting, grooming, dressing, etc )  - Assess/evaluate cause of self-care deficits   - Assess range of motion  - Assess patient's mobility; develop plan if impaired  - Assess patient's need for assistive devices and provide as appropriate  - Encourage maximum independence but intervene and supervise when necessary  - Involve family in performance of ADLs  - Assess for home care needs following discharge   - Consider OT consult to assist with ADL evaluation and planning for discharge  - Provide patient education as appropriate  Outcome: Adequate for Discharge  Goal: Maintain or return mobility status to optimal level  Description: INTERVENTIONS:  - Assess patient's baseline mobility status (ambulation, transfers, stairs, etc )    - Identify cognitive and physical deficits and behaviors that affect mobility  - Identify mobility aids required to assist with transfers and/or ambulation (gait belt, sit-to-stand, lift, walker, cane, etc )  - [place] fall precautions as indicated by assessment  - Record patient progress and toleration of activity level on Mobility SBAR; progress patient to next Phase/Stage  - Instruct patient to call for assistance with activity based on assessment  - Consider rehabilitation consult to assist with strengthening/weightbearing, etc   Outcome: Adequate for Discharge     Problem: DISCHARGE PLANNING  Goal: Discharge to home or other facility with appropriate resources  Description: INTERVENTIONS:  - Identify barriers to discharge w/patient and caregiver  - Arrange for needed discharge resources and transportation as appropriate  - Identify discharge learning needs (meds, wound care, etc )  - Arrange for interpretive services to assist at discharge as needed  - Refer to Case Management Department for coordinating discharge planning if the patient needs post-hospital services based on physician/advanced practitioner order or complex needs related to functional status, cognitive ability, or social support system  Outcome: Adequate for Discharge     Problem: Knowledge Deficit  Goal: Patient/family/caregiver demonstrates understanding of disease process, treatment plan, medications, and discharge instructions  Description: Complete learning assessment and assess knowledge base    Interventions:  - Provide teaching at level of understanding  - Provide teaching via preferred learning methods  Outcome: Adequate for Discharge

## 2021-04-06 NOTE — PROGRESS NOTES
Subjective: No acute events overnight  No acute distress  Resting comfortably in bed  States that she continues to have pain and shoulder    Objective:  A 10 point ROS was performed; negative except as noted above  Lab Results   Component Value Date/Time    WBC 11 40 (H) 04/06/2021 05:39 AM    WBC 7 66 07/07/2015 04:04 PM    HGB 10 9 (L) 04/06/2021 05:39 AM    HGB 12 3 07/07/2015 04:04 PM       Vitals:    04/05/21 2322   BP: 111/75   Pulse: 88   Resp: 18   Temp: (!) 97 4 °F (36 3 °C)   SpO2: 96%     left upper extremity  · Dressings clean dry intact  · Sensation intact to axillary, musculocutaneous, radial, ulna, median nerves  · Motor intact to axillary, musculocutaneous, radial, ulna, median nerves  · 2+ Radial pulse     Assessment: 56 y  o female post operative day 7 left total shoulder arthroplasty  Doing well     Plan:  · Nonweight Bearing left upper extremity  · Up and out of bed with assist  · Sling for Comfort, may remove for pendulums and hygiene  · DVT prophylaxis - mechanical  · Ice and analgesics  · Continue discharge planning    Rehab versus home with home PT  · Patient continues to be stable for discharge

## 2021-04-06 NOTE — PLAN OF CARE
Problem: Potential for Falls  Goal: Patient will remain free of falls  Description: INTERVENTIONS:  - Assess patient frequently for physical needs  -  Identify cognitive and physical deficits and behaviors that affect risk of falls    -  [place] fall precautions as indicated by assessment   - Educate patient/family on patient safety including physical limitations  - Instruct patient to call for assistance with activity based on assessment  - Modify environment to reduce risk of injury  - Consider OT/PT consult to assist with strengthening/mobility  Outcome: Progressing

## 2021-04-06 NOTE — H&P
[facility]  H&P- Javier Carcamo 1960, 56 y o  female MRN: [id number]  Unit/Bed#: -01 Encounter: [id number]  Primary Care Provider: Sai Mckeon MD   Date and time admitted to hospital: 4/6/2021 12:52 PM    * Aftercare following surgery of the musculoskeletal system, NEC  Assessment & Plan  She has been having osteoarthritis of left shoulder status post total arthroplasty of left shoulder on 03/30/2021  Postoperatively noted to have anemia hemoglobin dropped from 13-10 currently  Otherwise no major complication  Physical therapy evaluated the patient and recommended rehab given decrease in drains and gait imbalance  Will consult PT/OT for strengthening and gait training  Scheduled Robaxin  Pain management  Supportive care  On Eliquis or history of DVT will continue    History of pulmonary embolism  Assessment & Plan  Resume pre-hospital Eliquis  Anemia  Assessment & Plan  Hemoglobin baseline 13 preoperatively current around 10 will continue to monitor weekly  Anxiety  Assessment & Plan  Resume pre-hospital Ativan  Intractable migraine  Assessment & Plan  Follow-up at [facility]  Continue lamotrigine  Vertigo  Assessment & Plan  Continue pre-hospital meclizine  Bipolar depression ([address] )  Assessment & Plan  Resume pre-hospital Abilify and Luvox  VTE Prophylaxis: Apixaban (Eliquis)  / sequential compression device   Code Status: Full code  POLST: POLST form is not discussed and not completed at this time  Discussion with family: Discussed with patient  Anticipated Length of Stay:  Patient will be admitted on an SNF Short Term Inpatient basis with an anticipated length of stay of  More than  2 midnights  Justification for Hospital Stay: Rehab  Total Time for Visit, including Counseling / Coordination of Care: 45 minutes  Greater than 50% of this total time spent on direct patient counseling and coordination of care      Chief Complaint:   Left shoulder pain    History of Present Illness:    Javier Carcamo is a 56 y o  female who presents with PMH of bipolar disorder with anxiety, PE, DVT, migraine and obesity admitted to rehab following left shoulder arthroplasty due to decrease gait balance  She was hospitalized for elective left total shoulder arthroplasty due to osteoarthritis and had surgery on 3/30/21 at [place]  Postoperative anemia noted with Hb 10 from 13 otherwise she had uneventful hospitalization  She was noted to have decrease balance and endurance hence rehab recommended  At the time of my eval she was complaining of left shoulder pain at the surgical site otherwise had no complains  Review of Systems:    Review of Systems   Constitutional: Negative for chills and fever  HENT: Negative for rhinorrhea and sore throat  Eyes: Negative for pain and visual disturbance  Respiratory: Negative for cough and shortness of breath  Cardiovascular: Negative for chest pain and palpitations  Gastrointestinal: Negative for nausea and vomiting  Genitourinary: Negative for difficulty urinating and dysuria  Musculoskeletal: Positive for arthralgias and gait problem  Negative for back pain  Skin: Negative for color change and rash  Neurological: Negative for light-headedness and headaches  All other systems reviewed and are negative        Past Medical and Surgical History:     Past Medical History:   Diagnosis Date    Adrenal insufficiency (Woodford's disease) ([facility] [address] )     Bipolar disorder     C  difficile diarrhea     Cervical radiculopathy     Chronic back pain     Chronic pain disorder     lumbar    DVT, lower extremity ([facility] [address] ) 1991    Fibromyalgia     History of TIAs     cannot remember details    Hypertension     Hypokalemia     Migraine     MRSA (methicillin resistant Staphylococcus aureus) 07/20/2012    nasal swab negative 4/5/19    Psychiatric disorder     Anxiety, major depression, bipolar  Spinal stenosis     Stroke ([facility] [address] )     tia    Syncope 2014    orthostatic hypotension    Wears dentures     upper       Past Surgical History:   Procedure Laterality Date    APPENDECTOMY      CAST APPLICATION Left 8/6/2322    Procedure: Application short-arm splint;  Surgeon: Aniya Fitzgerald MD;  [facility];  Service: Orthopedics    COLONOSCOPY      GASTRIC RESTRICTION SURGERY      Gastric Sleeve Nov 2015    HYSTERECTOMY      DONALD    JOINT REPLACEMENT      Left Knee 2011 and Right Hip 2010    ORIF WRIST FRACTURE Left 7/4/2020    Procedure: Open reduction and internal fixation left radius and ulnar shaft fracture;  Surgeon: Aniya Fitzgerald MD;  [facility];  Service: Orthopedics    MS RECONSTR TOTAL SHOULDER IMPLANT Left 3/30/2021    Procedure: ARTHROPLASTY SHOULDER - anatomic;  Surgeon: Gordy London MD;  [facility];  Service: Orthopedics    WISDOM TOOTH EXTRACTION         Meds/Allergies:    Prior to Admission medications    Medication Sig Start Date End Date Taking?  Authorizing Provider   acetaminophen (TYLENOL) 500 mg tablet Take 1 tablet (500 mg total) by mouth every 6 (six) hours as needed (pain) 7/30/19  Yes Vinod Moore PA-C   apixaban (ELIQUIS) 5 mg Take 5 mg by mouth 2 (two) times a day   Yes Historical Provider, MD   ARIPiprazole (ABILIFY) 15 mg tablet Take 0 5 tablets (7 5 mg total) by mouth daily  Patient taking differently: Take 5 mg by mouth daily  10/21/19  Yes Thanh Davidson DO   ferrous sulfate 325 (65 Fe) mg tablet Take 325 mg by mouth daily with breakfast   Yes Historical Provider, MD   fluvoxaMINE (LUVOX) 50 mg tablet Take 3 tablets (150 mg total) by mouth daily at bedtime  Patient taking differently: Take 300 mg by mouth daily at bedtime  10/20/19  Yes Thanh Davidson DO   gabapentin (NEURONTIN) 300 mg capsule Take 1 capsule (300 mg total) by mouth 3 (three) times a day 7/9/20  Yes Carlos Manuel Linda PA-C   lamoTRIgine (LaMICtal) 200 MG tablet Take 200 mg by mouth daily  Yes Historical Provider, MD   LORazepam (ATIVAN) 1 mg tablet Take 2 tablets (2 mg total) by mouth 2 (two) times a day for 10 days  Patient taking differently: Take 2 mg by mouth 4 (four) times a day One in morning, one tab afternoon two tablet at bedtime 7/9/20  Yes Nguyen Rodriguez PA-C   meclizine (ANTIVERT) 12 5 MG tablet Take 1 tablet (12 5 mg total) by mouth every 8 (eight) hours as needed for dizziness for up to 7 days 7/1/20  Yes Brooklynn Sawyer MD   ondansetron [facility]) 4 mg tablet Take 1 tablet (4 mg total) by mouth every 8 (eight) hours as needed for nausea or vomiting 10/7/19  Yes Reynold Gregg DO   oxyCODONE (ROXICODONE) 5 mg immediate release tablet 1 tablets every 6 hours as needed for severe pain only  3/30/21  Yes Kadeem Jacques PA-C   potassium chloride (K-DUR,KLOR-CON) 20 mEq tablet Take 40 mEq by mouth daily   Yes Historical Provider, MD   cholecalciferol (VITAMIN D3) 1,000 units tablet Take 5,000 Units by mouth daily    Historical Provider, MD   Erenumab-aooe (AIMOVIG) 140 MG/ML SOAJ Inject 140 mg under the skin every 30 (thirty) days  3/22/19   Historical Provider, MD     I have reviewed home medications with a medical source (PCP, Pharmacy, other)  Allergies: Allergies   Allergen Reactions    Ketorolac Itching and Other (See Comments)     [toradol] Itching, hives    Mushroom Extract Complex - Food Allergy Hives       Social History:     Marital Status: /Civil Union   Occupation: Unemployed  Patient Pre-hospital Living Situation: Home with   Patient Pre-hospital Level of Mobility: Cane/walker  Patient Pre-hospital Diet Restrictions: None     Substance Use History:   Social History     Substance and Sexual Activity   Alcohol Use Yes    Alcohol/week: 2 0 standard drinks    Types: 1 Glasses of wine, 1 Standard drinks or equivalent per week    Frequency: Monthly or less    Drinks per session: 1 or 2    Binge frequency: Less than monthly Comment: occasionally     Social History     Tobacco Use   Smoking Status Former Smoker    Packs/day: 0 20    Years: 20 00    Pack years: 4 00    Types: Cigarettes    Start date: 0    Quit date:     Years since quittin 2   Smokeless Tobacco Never Used   Tobacco Comment    quit     Social History     Substance and Sexual Activity   Drug Use Never    Comment: na       Family History:    non-contributory    Physical Exam:     Vitals:   Blood Pressure: 125/66 (21 1640)  Pulse: 85 (21 1640)  Temperature: (!) 96 6 °F (35 9 °C) (21 1640)  Temp Source: Temporal (21 1640)  Respirations: 18 (21 1640)  Height: 5' 5" (165 1 cm) (21 1300)  Weight - Scale: 108 kg (238 lb 12 1 oz) (21 1300)  SpO2: 93 % (21 1640)    Physical Exam  Vitals signs reviewed  Constitutional:       General: She is not in acute distress  Appearance: She is not ill-appearing, toxic-appearing or diaphoretic  HENT:      Head: Normocephalic  Right Ear: External ear normal       Left Ear: External ear normal       Nose: Nose normal    Eyes:      General:         Right eye: No discharge  Left eye: No discharge  Neck:      Musculoskeletal: Neck supple  Cardiovascular:      Rate and Rhythm: Normal rate and regular rhythm  Heart sounds: Normal heart sounds  No murmur  Pulmonary:      Effort: Pulmonary effort is normal  No respiratory distress  Breath sounds: Normal breath sounds  No wheezing  Abdominal:      General: Bowel sounds are normal  There is no distension  Palpations: Abdomen is soft  Tenderness: There is no abdominal tenderness  There is no guarding  Musculoskeletal:         General: Deformity present  Comments: Left shoulder arthroplasty surgical incision with dressing and immobilizer   Skin:     General: Skin is dry  Neurological:      Mental Status: She is alert and oriented to person, place, and time     Psychiatric: Behavior: Behavior normal            Additional Data:     Lab Results: I have personally reviewed pertinent reports  Results from last 7 days   Lab Units 04/06/21  0539   WBC Thousand/uL 11 40*   HEMOGLOBIN g/dL 10 9*   HEMATOCRIT % 35 2   PLATELETS Thousands/uL 408*   NEUTROS PCT % 66   LYMPHS PCT % 16   MONOS PCT % 12   EOS PCT % 4     Results from last 7 days   Lab Units 04/06/21  0539   SODIUM mmol/L 141   POTASSIUM mmol/L 4 1   CHLORIDE mmol/L 108   CO2 mmol/L 28   BUN mg/dL 13   CREATININE mg/dL 0 73   ANION GAP mmol/L 5   CALCIUM mg/dL 9 0   GLUCOSE RANDOM mg/dL 84                       Imaging: I have personally reviewed pertinent reports  No orders to display       EKG, Pathology, and Other Studies Reviewed on Admission:       [facility] Records Reviewed: Yes     ** Please Note: This note has been constructed using a voice recognition system   **

## 2021-04-06 NOTE — ASSESSMENT & PLAN NOTE
She has been having osteoarthritis of left shoulder status post total arthroplasty of left shoulder on 03/30/2021  Postoperatively noted to have anemia hemoglobin dropped from 13-10 currently  Otherwise no major complication  Physical therapy evaluated the patient and recommended rehab given decrease in drains and gait imbalance        Will consult PT/OT for strengthening and gait training  Scheduled Robaxin  Pain management  Supportive care  On Eliquis or history of DVT will continue

## 2021-04-06 NOTE — NURSING NOTE
Received report of admission from [facility], pt arrived 1300 in [address]  Ambulated to bed stand by assist  AOx4  Reports that  will be dropping off her cellphone  VS WDL  Call bell in reach  Resting comfortably with pillow under L shoulder and ice pack

## 2021-04-07 PROCEDURE — 97535 SELF CARE MNGMENT TRAINING: CPT

## 2021-04-07 PROCEDURE — 97167 OT EVAL HIGH COMPLEX 60 MIN: CPT

## 2021-04-07 PROCEDURE — 97163 PT EVAL HIGH COMPLEX 45 MIN: CPT

## 2021-04-07 PROCEDURE — 97116 GAIT TRAINING THERAPY: CPT

## 2021-04-07 RX ORDER — ONDANSETRON 4 MG/1
4 TABLET, ORALLY DISINTEGRATING ORAL EVERY 6 HOURS PRN
Status: DISCONTINUED | OUTPATIENT
Start: 2021-04-07 | End: 2021-04-10 | Stop reason: HOSPADM

## 2021-04-07 RX ORDER — LIDOCAINE 50 MG/G
1 PATCH TOPICAL DAILY
Status: DISCONTINUED | OUTPATIENT
Start: 2021-04-07 | End: 2021-04-10 | Stop reason: HOSPADM

## 2021-04-07 RX ADMIN — OXYCODONE HYDROCHLORIDE 5 MG: 5 TABLET ORAL at 22:54

## 2021-04-07 RX ADMIN — DOCUSATE SODIUM 50 MG AND SENNOSIDES 8.6 MG 1 TABLET: 8.6; 5 TABLET, FILM COATED ORAL at 22:54

## 2021-04-07 RX ADMIN — NYSTATIN 500000 UNITS: 100000 SUSPENSION ORAL at 12:06

## 2021-04-07 RX ADMIN — NYSTATIN 500000 UNITS: 100000 SUSPENSION ORAL at 17:34

## 2021-04-07 RX ADMIN — LAMOTRIGINE 200 MG: 100 TABLET ORAL at 08:59

## 2021-04-07 RX ADMIN — OXYCODONE HYDROCHLORIDE 5 MG: 5 TABLET ORAL at 09:01

## 2021-04-07 RX ADMIN — METHOCARBAMOL 500 MG: 500 TABLET, FILM COATED ORAL at 12:06

## 2021-04-07 RX ADMIN — OXYCODONE HYDROCHLORIDE 5 MG: 5 TABLET ORAL at 00:40

## 2021-04-07 RX ADMIN — GABAPENTIN 300 MG: 300 CAPSULE ORAL at 23:13

## 2021-04-07 RX ADMIN — ONDANSETRON 4 MG: 4 TABLET, ORALLY DISINTEGRATING ORAL at 08:08

## 2021-04-07 RX ADMIN — METHOCARBAMOL 500 MG: 500 TABLET, FILM COATED ORAL at 17:34

## 2021-04-07 RX ADMIN — METHOCARBAMOL 500 MG: 500 TABLET, FILM COATED ORAL at 05:02

## 2021-04-07 RX ADMIN — Medication 2000 UNITS: at 08:58

## 2021-04-07 RX ADMIN — LIDOCAINE 1 PATCH: 50 PATCH TOPICAL at 23:01

## 2021-04-07 RX ADMIN — LORAZEPAM 2 MG: 1 TABLET ORAL at 17:34

## 2021-04-07 RX ADMIN — NYSTATIN 500000 UNITS: 100000 SUSPENSION ORAL at 08:59

## 2021-04-07 RX ADMIN — ARIPIPRAZOLE 5 MG: 5 TABLET ORAL at 08:58

## 2021-04-07 RX ADMIN — APIXABAN 5 MG: 5 TABLET, FILM COATED ORAL at 08:59

## 2021-04-07 RX ADMIN — ACETAMINOPHEN 975 MG: 325 TABLET ORAL at 22:54

## 2021-04-07 RX ADMIN — OXYCODONE HYDROCHLORIDE 5 MG: 5 TABLET ORAL at 05:03

## 2021-04-07 RX ADMIN — OXYCODONE HYDROCHLORIDE 5 MG: 5 TABLET ORAL at 13:28

## 2021-04-07 RX ADMIN — METHOCARBAMOL 500 MG: 500 TABLET, FILM COATED ORAL at 23:02

## 2021-04-07 RX ADMIN — METHOCARBAMOL 500 MG: 500 TABLET, FILM COATED ORAL at 00:40

## 2021-04-07 RX ADMIN — GABAPENTIN 300 MG: 300 CAPSULE ORAL at 16:24

## 2021-04-07 RX ADMIN — FLUVOXAMINE MALEATE 300 MG: 50 TABLET ORAL at 22:52

## 2021-04-07 RX ADMIN — APIXABAN 5 MG: 5 TABLET, FILM COATED ORAL at 17:34

## 2021-04-07 RX ADMIN — GABAPENTIN 300 MG: 300 CAPSULE ORAL at 08:58

## 2021-04-07 RX ADMIN — ACETAMINOPHEN 975 MG: 325 TABLET ORAL at 13:28

## 2021-04-07 RX ADMIN — OXYCODONE HYDROCHLORIDE 5 MG: 5 TABLET ORAL at 17:58

## 2021-04-07 RX ADMIN — NYSTATIN 500000 UNITS: 100000 SUSPENSION ORAL at 23:01

## 2021-04-07 RX ADMIN — POTASSIUM CHLORIDE 40 MEQ: 20 TABLET, EXTENDED RELEASE ORAL at 08:58

## 2021-04-07 RX ADMIN — ACETAMINOPHEN 975 MG: 325 TABLET ORAL at 05:03

## 2021-04-07 RX ADMIN — LORAZEPAM 2 MG: 1 TABLET ORAL at 08:59

## 2021-04-07 NOTE — OCCUPATIONAL THERAPY NOTE
Occupational Therapy Evaluation and Treatment      Patient Name: Dannielle Cha  GWJST'T Date: 4/7/2021  Problem List  Principal Problem:    Aftercare following surgery of the musculoskeletal system, NEC  Active Problems:    Bipolar depression ([facility] )    Vertigo    Intractable migraine    Anxiety    Anemia    History of pulmonary embolism    Past Medical History  Past Medical History:   Diagnosis Date    Adrenal insufficiency (Ogden's disease) ([facility] )     Bipolar disorder     C  difficile diarrhea     Cervical radiculopathy     Chronic back pain     Chronic pain disorder     lumbar    DVT, lower extremity ([facility] ) 1991    Fibromyalgia     History of TIAs     cannot remember details    Hypertension     Hypokalemia     Migraine     MRSA (methicillin resistant Staphylococcus aureus) 07/20/2012    nasal swab negative 4/5/19    Psychiatric disorder     Anxiety, major depression, bipolar    Spinal stenosis     Stroke ([facility] )     tia    Syncope 2014    orthostatic hypotension    Wears dentures     upper     Past Surgical History  Past Surgical History:   Procedure Laterality Date    APPENDECTOMY      CAST APPLICATION Left 9/2/1484    Procedure: Application short-arm splint;  Surgeon: Ryan Dawson MD;  [facility];  Service: Orthopedics    COLONOSCOPY      GASTRIC RESTRICTION SURGERY      Gastric Sleeve Nov 2015    HYSTERECTOMY      DONALD    JOINT REPLACEMENT      Left Knee 2011 and Right Hip 2010    ORIF WRIST FRACTURE Left 7/4/2020    Procedure: Open reduction and internal fixation left radius and ulnar shaft fracture;  Surgeon: Ryan Dawson MD;  [facility];  Service: Orthopedics    NV RECONSTR TOTAL SHOULDER IMPLANT Left 3/30/2021    Procedure: ARTHROPLASTY SHOULDER - anatomic;  Surgeon: Harlene Kanner, MD;  [facility];  Service: Orthopedics    WISDOM TOOTH EXTRACTION          Time- [id number] 15 min eval 25 min txt   Vitals- stable   Standardized Assessment- see flowsheet   Home Evaluation (virtual)- will obtain photos as needed  Family/Caregiver Training- to initiate as needed    Additional Comments- Follow standard TSA protocol (Fracisco BRYANT)     Pt remains in room with all needs at end of session      Assessment:        04/07/21 1125   OT Last Visit   OT Visit Date 04/07/21   Note Type   Note type Evaluation/Treatment    Restrictions/Precautions   Weight Bearing Precautions Per Order Yes   LUE Weight Bearing Per Order NWB   Braces or Orthoses Sling  (abduction sling )   Other Precautions Pain;WBS   Pain Assessment   Pain Assessment Tool 0-10   Pain Score Worst Possible Pain   Pain Location/Orientation Orientation: Left; Location: Shoulder   Pain Onset/Description Onset: Ongoing   Effect of Pain on Daily Activities limits participation in ADLs   Home Living   Type of [address] [place] [address]; Able to live on main level with bedroom/bathroom;Stairs to enter with rails  (3 BECCA HR's; first floor setup with full bath )   Bathroom Shower/Tub Walk-in shower   Bathroom Toilet Raised   Bathroom Equipment Shower chair;Grab bars in [facility]   Prior Function   Level of [place] Independent with ADLs and functional mobility   Lives With Spouse  (works nights; home by [employer] next day )   [address] 30 in the last 6 months 1 to 4   Vocational Retired   Comments pt reports daughter to stay with her for 2 weeks upon d/c    Psychosocial   Psychosocial (WDL) WDL   Subjective   Subjective "I was independent before"    ADL   Eating Assistance 4  Minimal Assistance   Grooming Assistance 4  Minimal Assistance   UB Bathing Assistance 3  Moderate Assistance   LB [address] 3  Moderate [address] 3  Moderate [address] 3  Moderate [address]  3  Moderate Assistance   Bed Mobility   Additional Comments Pt received OOB, returned to recliner at conclusion of session   Transfers   Sit to Stand 5  Supervision   Additional items Armrests; Increased time required   Stand to Sit 5  Supervision   Additional items Armrests; Increased time required   Stand pivot 5  Supervision   Additional items Armrests; Increased time required   Toilet transfer 5  Supervision   Additional items Armrests; Increased time required;Verbal cues;Raised toilet seat   Additional Comments no AD    Functional Mobility   Functional Mobility 5  Supervision   Additional Comments household distances within room; NO AD    Balance   Static Sitting Fair +   Dynamic Sitting Fair +   Static Standing Fair   Dynamic Standing Fair   Ambulatory Fair   Activity Tolerance   Activity Tolerance Patient limited by pain   Nurse Made Aware Spoke to RN    RUE Assessment   RUE Assessment WFL  (4/5 )   LUE Assessment   LUE Assessment X  (WFL elbow distally; intact cap refill)   Hand Function   Gross Motor Coordination Impaired   Fine Motor Coordination Functional   Sensation   Light Touch No apparent deficits   Additional Comments denies numbness/tingling    Proprioception   Proprioception No apparent deficits   Perception   Inattention/Neglect Appears intact   Cognition   Overall Cognitive Status WFL   Arousal/Participation Alert; Cooperative   Attention Within functional limits   Orientation Level Oriented X4   Memory Within functional limits   Following Commands Follows all commands and directions without difficulty   Assessment   Limitation Decreased ADL status; Decreased UE ROM; Decreased UE strength;Decreased endurance;Decreased self-care trans;Decreased high-level ADLs  (Ortho restrictions )   Prognosis Good   Assessment Pt is a 56 y o  female seen for OT evaluation s/p admit [facility] on 4[facility]2021 s/p L TSA with Long Head Biceps Tenodesis performed by Dr Kay Grullon 3/30/2021  NWB to LUE with abduction sling in place   To follow standard TSA protocol (contacted María for appropriate protocol, provided fax information)  OT completed an extensive review of pt's medical and social history  See PMH in pt chart  Personal factors affecting pt at time of IE include:steps to enter environment, limited home support, difficulty performing ADLS, difficulty performing IADLS  and environment  As per pt report, pta living with spouse in 2  with 3 BECCA, full bath on main floor with first floor setup  Spouse works full time, [id number], leaving patient home alone majority of the day  Independent with ADLs/IADLs and mobility with no AD  (+) drives  Sleeps in recliner vs standard bed (varies on comfort)  Upon evaluation, pt requires S with transfers and mobility with no AD  Adjusted sling, improper placement on arrival, educated pt on appropriate fit and placement of straps  UE strength WFL to RUE, elbow<>distally WFL on LUE  Intact cap refill and grasp  Severe pain to L shoulder, notified RN  Pt currently requires min A with feeding/grooming and Mod A with UB/LB ADL and toileting  2* the following deficits impacting occupational performance  Pt presents to OT below baseline due to the following performance deficits: weakness, decreased ROM, decreased strength, decreased balance, decreased tolerance, increased pain and orthopedic restrictions  Pt to benefit from continued skilled OT services while in the hospital to address deficits as defined above and maximize level of functional independence w ADL's and functional mobility  Occupational performance areas to address include: eating, grooming, bathing/shower, toilet hygiene, functional mobility, community mobility and clothing management  The patient's raw score on the AM-PAC Daily Activity inpatient short form is 16, standardized score is 35 96, less than 39 4  Based on OT evaluation, assessment(s), performance deficits listed, and current level of function, pt identified as a HIGH  complexity evaluation   From OT standpoint, recommendation at time of d/c would be home with skilled therapy and family support (OP OT/PT)  Goals   Patient Goals to be independent; to go home    Plan   Treatment Interventions ADL retraining;Functional transfer training;UE strengthening/ROM; Endurance training;Patient/family training;Equipment evaluation/education; Neuromuscular reeducation; Compensatory technique education; Energy conservation; Activityengagement   Goal Expiration Date 04/14/21   OT Treatment Day 1   OT Frequency Other (comment)  (5-7x/wk 1-2x/day  )   Additional Treatment Session   Start Time 1140   End Time 1205   Treatment Assessment Pt seen for Occupational Therapy treatment following evaluation with skilled intervention targeting functional transfer/mobility training with focus on proper body mechanics to facilitate safe ambulation in simulated home environment  Pt exhibits good insight to deficits and demonstrates good safety with all transfers and mobility trials  Toileted supervision, completes tasks in seated position (no CM)  Pt limited by pain, ROM, weakness, fatigue, endurance, activity tolerance , standing tolerance, static/dynamic standing balance  and orthopedic Restrictions/WBS  Pt educated on POC/purpose/benefits of OT/PT while on TCF, safe functional transfer techniques with appropriate body mechanics, fall prevention and safe discharge planning/ safety at home   Pt would benefit from continued OT sessions to further address above deficits to maximize independence      Additional Treatment Day 1   Recommendation   OT Discharge Recommendation Home with skilled therapy   AM-PAC Daily Activity Inpatient   Lower Body Dressing 2   Bathing 3   Toileting 3   Upper Body Dressing 2   Grooming 3   Eating 3   Daily Activity Raw Score 16   Daily Activity Standardized Score (Calc for Raw Score >=11) 35 96       Goals STG achieved within 1  Week  Performance at Initial Evaluation 4/7/2021  Current Performance (last date completed)   Feeding (cutting, opening containers, etc)  Devorah/I Min A    Grooming while standing at sink Devorah/I Min A (seated)     ADL transfers  Devorah/I Supervision     Bathroom mobility with appropriate AD Devorah/I Supervision     UB ADL  Devorah/I Mod A     LB ADL, AE PRN Devorah/I Mod A     Toileting/clothing management and hygiene Devorah/I Mod A     Dynamic standing balance for increased safety when completing purposeful tasks F+ F    Increase standing tolerance for inc'd safety with standing purposeful tasks >5 min  2 min     Participate in therex 3-5x/week for inc'd overall stamina/activity tolerance for purposeful tasks (per María protocol)  To be completed     shower stall transfer  Supervision      Item retrieval for inc'd independence and safety negotiating home environment Devorah/I     Bed mobility with HOB flat (pt reports possibly sleeping in recliner upon discharge; discharge goal when confirmed)  Devorah/I           Delaymarilu Stark, MS, OTR/L

## 2021-04-07 NOTE — PLAN OF CARE
Problem: OCCUPATIONAL THERAPY ADULT  Goal: Performs self-care activities at highest level of function for planned discharge setting  See evaluation for individualized goals  Description: Treatment Interventions: ADL retraining, Functional transfer training, UE strengthening/ROM, Endurance training, Patient/family training, Equipment evaluation/education, Neuromuscular reeducation, Compensatory technique education, Energy conservation, Activityengagement          See flowsheet documentation for full assessment, interventions and recommendations     Outcome: Progressing  Note: Limitation: Decreased ADL status, Decreased UE ROM, Decreased UE strength, Decreased endurance, Decreased self-care trans, Decreased high-level ADLs(Ortho restrictions )  Prognosis: Good  Assessment: see note      OT Discharge Recommendation: Home with skilled therapy

## 2021-04-07 NOTE — UTILIZATION REVIEW
Nelida Mccain MD   Resident   Orthopedics   Discharge Summary       Attested   Date of Service:  4/6/2021 10:01 AM               Attested        Attestation signed by Hi Leary MD at 4/6/2021 10:41 AM    I have discussed the management of this patient with Nelida Mccain MD and agree with the documented assessment and plan               Show:Clear all  [x]Manual[x]Template[]Copied    Added by:  Josephine Galvez MD    []Sherifver for details  ORTHOPEDICS DISCHARGE SUMMARY  Bita Woodson 56 y o  female MRN: [id number]  Unit/Bed#: -01     Attending Physician: Cinthia Almanza     Admitting diagnosis: Primary osteoarthritis of left shoulder [M19 012]     Discharge diagnosis: Primary osteoarthritis of left shoulder [M19 012]     Date of admission: 3/30/2021     Date of discharge: 04/06/21          Procedure: Left total shoulder arthropalsty     HPI:  This is a 56y o  year old female that presented to the office with signs and symptoms of left shoulder osteoarthritis and/or other pathology  They tried and failed conservative treatment measures and wished to proceed with surgical intervention  The risks, benefits, and complications of the procedure were discussed with the patient and informed consent was obtained   [facility] Course: The patient was admitted to the hospital on 3/30/2021 and underwent an uncomplicated left total shoulder arthroplasty  They were transferred to the floor after a brief stay in the post-anesthesia care unit  Their pain was well managed with IV and oral pain medications  Patient required additional inpatient stay due to issues with balance and PT/OT recommendations for rehab placement on discharge  This was in an attempt to prevent patient falling at home as she has had multiple recent falls requiring emergency department visits in past 12 months   On discharge date pt was cleared by PT and the medicine team for discharge to [facility] to work on balance and ADL's before discharge to home  It was noted that if she went home she would be left alone for a significant portion of the day as family could not provide around the clock support            0   Lab Value Date/Time     HGB 10 9 (L) 04/06/2021 0539     HGB 11 4 (L) 04/05/2021 0538     HGB 11 9 04/02/2021 1055     HGB 12 5 03/31/2021 0630     HGB 13 1 03/17/2021 1421     HGB 10 0 (L) 07/06/2020 0513     HGB 10 1 (L) 07/05/2020 0532     HGB 11 6 07/04/2020 1249     HGB 10 8 (L) 07/04/2020 0509     HGB 11 8 07/03/2020 1653     HGB 13 6 06/27/2020 1400     HGB 11 6 10/16/2019 0448     HGB 12 2 10/15/2019 1800     HGB 11 4 (L) 10/03/2019 0542     HGB 12 3 10/02/2019 1510     HGB 10 2 (L) 07/08/2019 0636     HGB 9 3 (L) 07/06/2019 1154     HGB 9 7 (L) 06/05/2019 0630     HGB 9 7 (L) 06/03/2019 0546     HGB 9 9 (L) 06/02/2019 0558     HGB 10 2 (L) 05/31/2019 1436     HGB 8 9 (L) 04/12/2019 0608     HGB 8 9 (L) 04/11/2019 1244     HGB 8 7 (L) 04/11/2019 0557     HGB 10 9 (L) 04/07/2019 0858     HGB 9 2 (L) 04/06/2019 0556     HGB 10 0 (L) 04/05/2019 1328     HGB 10 1 (L) 08/12/2018 0823     HGB 9 5 (L) 07/22/2018 1709     HGB 10 5 (L) 07/09/2018 1157     HGB 9 9 (L) 06/23/2018 0403     HGB 10 6 (L) 06/22/2018 1756     HGB 11 5 01/23/2018 1843     HGB 11 7 01/04/2018 1018     HGB 10 9 (L) 12/21/2017 1822     HGB 12 4 10/08/2017 0740     HGB 13 4 08/09/2017 1023     HGB 11 1 (L) 08/03/2017 0556     HGB 13 3 07/29/2017 1627     HGB 12 6 06/04/2017 1309     HGB 12 7 06/03/2017 1218     HGB 12 1 05/29/2017 0442     HGB 12 9 05/28/2017 1057     HGB 13 8 11/12/2016 1222     HGB 14 7 09/11/2016 1134     HGB 14 3 07/21/2016 1105     HGB 13 7 06/23/2016 1034     HGB 12 3 07/07/2015 1604     HGB 10 9 (L) 07/03/2015 0537     HGB 10 8 (L) 07/01/2015 0516     HGB 11 7 06/30/2015 0440     HGB 13 2 06/29/2015 1155     HGB 12 0 05/08/2015 0450     HGB 13 1 05/07/2015 1030     HGB 12 9 04/04/2015 1510     HGB 13 3 11/12/2014 1315     HGB 12 7 11/06/2014 1635     HGB 12 6 10/07/2014 1709     HGB 12 0 10/01/2014 0616     HGB 12 8 09/30/2014 1430     HGB 13 0 09/13/2014 0525     HGB 13 9 09/12/2014 1355     HGB 12 2 08/15/2014 1446     HGB 12 7 07/30/2014 0415     HGB 13 7 07/29/2014 1155     HGB 12 7 07/08/2014 1936     HGB 11 9 06/29/2014 0645     HGB 12 8 06/29/2014 0013     HGB 11 9 06/18/2014 2207     HGB 11 1 (L) 05/25/2014 0530     HGB 10 8 (L) 05/24/2014 0450     HGB 11 5 05/23/2014 1850     HGB 12 5 02/22/2014 1918         Greater than 2 gram drop which qualifies for diagnosis of acute blood loss anemia  Vital signs remained stable and pt was resuscitated with IVF as needed   Body mass index is 34 06 kg/m²  mildly obese  Recommend behavior modifications, nutrition and physical activity      Discharge Instructions: The patient was discharged nonweight bearing to the left upper extremity in shoulder abduction brace  Take pain medications as instructed  PT/OT as per postoperative instructions      Discharge Medications:   For the complete list of discharge medications, please refer to the patient's medication reconciliation                       Cosigned by: Taya Aiken MD at 4/6/2021 10:41 AM   Revision History

## 2021-04-08 PROCEDURE — 97530 THERAPEUTIC ACTIVITIES: CPT

## 2021-04-08 PROCEDURE — 97110 THERAPEUTIC EXERCISES: CPT

## 2021-04-08 PROCEDURE — 97116 GAIT TRAINING THERAPY: CPT

## 2021-04-08 PROCEDURE — 97535 SELF CARE MNGMENT TRAINING: CPT

## 2021-04-08 RX ORDER — PANTOPRAZOLE SODIUM 40 MG/1
40 TABLET, DELAYED RELEASE ORAL
Status: DISCONTINUED | OUTPATIENT
Start: 2021-04-09 | End: 2021-04-10 | Stop reason: HOSPADM

## 2021-04-08 RX ADMIN — LIDOCAINE 1 PATCH: 50 PATCH TOPICAL at 21:12

## 2021-04-08 RX ADMIN — Medication 2000 UNITS: at 08:22

## 2021-04-08 RX ADMIN — NYSTATIN 500000 UNITS: 100000 SUSPENSION ORAL at 17:14

## 2021-04-08 RX ADMIN — METHOCARBAMOL 500 MG: 500 TABLET, FILM COATED ORAL at 17:15

## 2021-04-08 RX ADMIN — NYSTATIN 500000 UNITS: 100000 SUSPENSION ORAL at 21:25

## 2021-04-08 RX ADMIN — ONDANSETRON 4 MG: 4 TABLET, ORALLY DISINTEGRATING ORAL at 08:24

## 2021-04-08 RX ADMIN — LORAZEPAM 2 MG: 1 TABLET ORAL at 17:14

## 2021-04-08 RX ADMIN — ACETAMINOPHEN 975 MG: 325 TABLET ORAL at 14:05

## 2021-04-08 RX ADMIN — LAMOTRIGINE 200 MG: 100 TABLET ORAL at 08:21

## 2021-04-08 RX ADMIN — APIXABAN 5 MG: 5 TABLET, FILM COATED ORAL at 17:15

## 2021-04-08 RX ADMIN — ACETAMINOPHEN 975 MG: 325 TABLET ORAL at 05:31

## 2021-04-08 RX ADMIN — METHOCARBAMOL 500 MG: 500 TABLET, FILM COATED ORAL at 11:32

## 2021-04-08 RX ADMIN — OXYCODONE HYDROCHLORIDE 5 MG: 5 TABLET ORAL at 11:32

## 2021-04-08 RX ADMIN — GABAPENTIN 300 MG: 300 CAPSULE ORAL at 15:42

## 2021-04-08 RX ADMIN — GABAPENTIN 300 MG: 300 CAPSULE ORAL at 08:10

## 2021-04-08 RX ADMIN — OXYCODONE HYDROCHLORIDE 5 MG: 5 TABLET ORAL at 07:04

## 2021-04-08 RX ADMIN — LORAZEPAM 2 MG: 1 TABLET ORAL at 08:21

## 2021-04-08 RX ADMIN — ACETAMINOPHEN 975 MG: 325 TABLET ORAL at 21:22

## 2021-04-08 RX ADMIN — GABAPENTIN 300 MG: 300 CAPSULE ORAL at 21:13

## 2021-04-08 RX ADMIN — OXYCODONE HYDROCHLORIDE 5 MG: 5 TABLET ORAL at 02:54

## 2021-04-08 RX ADMIN — POTASSIUM CHLORIDE 40 MEQ: 20 TABLET, EXTENDED RELEASE ORAL at 08:10

## 2021-04-08 RX ADMIN — NYSTATIN 500000 UNITS: 100000 SUSPENSION ORAL at 11:32

## 2021-04-08 RX ADMIN — OXYCODONE HYDROCHLORIDE 5 MG: 5 TABLET ORAL at 19:54

## 2021-04-08 RX ADMIN — DOCUSATE SODIUM 50 MG AND SENNOSIDES 8.6 MG 1 TABLET: 8.6; 5 TABLET, FILM COATED ORAL at 21:23

## 2021-04-08 RX ADMIN — APIXABAN 5 MG: 5 TABLET, FILM COATED ORAL at 08:22

## 2021-04-08 RX ADMIN — FLUVOXAMINE MALEATE 300 MG: 50 TABLET ORAL at 21:24

## 2021-04-08 RX ADMIN — ARIPIPRAZOLE 5 MG: 5 TABLET ORAL at 08:22

## 2021-04-08 RX ADMIN — METHOCARBAMOL 500 MG: 500 TABLET, FILM COATED ORAL at 05:31

## 2021-04-08 RX ADMIN — OXYCODONE HYDROCHLORIDE 5 MG: 5 TABLET ORAL at 15:42

## 2021-04-08 RX ADMIN — NYSTATIN 500000 UNITS: 100000 SUSPENSION ORAL at 08:10

## 2021-04-08 NOTE — OCCUPATIONAL THERAPY NOTE
Occupational Therapy Treatment Note    Name:  Sherif Caro   MRN:   [id number]  Age:     56 y o    Patient Active Problem List   Diagnosis    [facility] ([facility] )    Hypokalemia    Vertigo    Fibromyalgia    Spinal stenosis of lumbar region    Osteoarthritis of lumbosacral spine without myelopathy    Migraine    HLD (hyperlipidemia)    Syncope    Orthostatic hypotension    History of TIAs    Intractable migraine    Neck pain    Low back pain    PE [facility]pulmonary thromboembolism) ([facility] )    Hypertension    Bipolar disorder    Chronic back pain    DVT, lower extremity (HCC)    Anxiety    Leukocytosis    Chronic anticoagulation    Anemia    Ambulatory dysfunction    Edema    Right arm pain    Dizziness    Intractable migraine without aura and without status migrainosus    Closed head injury with brief loss of consciousness (HCC)    Shoulder pain    Class 2 obesity due to excess calories with body mass index (BMI) of 35 0 to 35 9 in adult    Closed fracture of distal ends of left radius and ulna    Fall down stairs    Fracture of multiple ribs of right side    Hematoma of parietal scalp    Bilateral pulmonary contusion    History of anxiety    History of pulmonary embolism    Right distal ulnar fracture    Status post total shoulder arthroplasty, left    Aftercare following surgery of the musculoskeletal system, NEC     S/P reverse total shoulder arthroplasty, left [[id number]]      Subjective/Goals:  "I think it would be better without the pain"    Vitals: stable     Pain: 7/10 left UE reporting pain medication received at 7am    Treatment Time: (935-1043) 68 minutes + (8502-3401) 38 minutes= 106 minutes    Cognition: WFL    Precautions: NWB LUE (abduction sling), Pain     EVALUATION information:   OT Goal expiration date: 4/14/21   Treatment day: 2   Discharge recommendation: home with skilled therapy    HOME SET UP:  o House- multi level on main level with bed/bath  o Stairs to enter + rails  o Walk In shower + shower chair and GB in shower  o DME: walker, cane  o Lives with spouse who works nights (home 3PM next day) + assist from family    Patient education: CARRY OVER OF WB STATUS, DEEP BREATHING TECHNIQUES T/O ACTIVITY, SLOWING OF PACE, ENERGY CONSERVATION TECHNIQUES FOR CARRY OVER UPON D/C, INCREASED FAMILY SUPPORT, CONTINUE PARTICIPATION IN SELF-CARE/MOBILITY WITH STAFF WHILE IN [facility] [address] , OVERALL SAFETY AWARENESS, FALL PREVENTION, ENERGY CONSERVATION  and PAIN MANAGEMENT WITH FUNCTIONAL ACTIVITIES    Additional comments: Pt is a 56 y o  female seen for OT evaluation s/p admit to [facility] on 4/6/2021 s/p L TSA with Long Head Biceps Tenodesis performed by Dr Casandra Mullen 3/30/2021  NWB to LUE with abduction sling in place  To follow standard TSA protocol (contacted María for appropriate protocol, provided fax information)  Assessment/Additional session details:  Patient seen this date for OT with focus on goals as set by OTR  Patient agreeable to skilled OT session with focus on ADL's (bathing, dressing, toileting), Transfers (STS, SPT), Standing tolerance/balance, Strength/ROM/HEP, Activity tolerance, Education on safety, fall prevention and energy conservation techniques, Item retrieval/safe transport with DME ed, ADL AE training, Compensatory techniques for follow of WBS and Bathroom/kitchen/home mobility  Barriers to treatment include fatigue, pain, weight bearing, safety, home environment (management in/out or throughout home), social/family support, decreased strength/coordination, balance , activity tolerance and standing tolerance  Patient educated on safe functional transfers techniques, the appropriate use of AE/DME to improve functional performance, and activity modification techniques for energy conservation  Plans for d/c are home with home OT and family support   Patient is making gains toward OT goals with continued OT recommended at this time to max tolerance, safety and function for appropriate d/c planning  At end of session patient remains in room seated at recliner with all needs within reach  Patient got fully dressed today to allow for ed and recommendations on needs as she is to d/c home with  whom works over the road and therefore not available until later PM   Patient states that she hopes to be home sometime this weekend as she has a Dr appointment on Monday  Ed on level of assist and support she would presently be needing  She does state that daughter will be coming for 2 weeks sometime next week but no date confirmed  See goal grid for functional status  She does report having a reacher and shoe horn for home and used to have a dressing stick but unsure location  Dressing stick used for session along with sock horse  Ed on recommendations for clothing that would be best to wear along with slipper in the home        GG scores:  eating (Setup), oral hygiene (Setup), toileting (S), bathing (Min/Mod A), UB dressing (Min/Mod A), LB dressing (S), and don/doff footwear min/mod assist        Goals STG achieved within 1  Week  Performance at Initial Evaluation 4/7/2021  Current Performance (last date completed)   Feeding (cutting, opening containers, etc)  Devorah/I Min A 4/8 set up needed    Grooming while standing at sink Devorah/I Min A (seated)   4/8 set up   ADL transfers  Devorah/I  4/8 Supervision   4/8 MI   Bathroom mobility with appropriate AD Devorah/I  4/8 Supervision   4/8 MI   UB ADL  Devorah/I Mod A   4/8 mod assist + ed except brace max assist    LB ADL, AE PRN Devorah/I Mod A   4/8 supervision + cues except socks mod assist and max shoes    Toileting/clothing management and hygiene Devorah/I Mod A   4/8 Supervision   Dynamic standing balance for increased safety when completing purposeful tasks F+  4/8 F  4/8 Fair+   Increase standing tolerance for inc'd safety with standing purposeful tasks >5 min  2 min   4/8 4 minutes   Participate in therex 3-5x/week for inc'd overall stamina/activity tolerance for purposeful tasks (per María protocol)  To be completed    4/8 good tolerance to week 1 protocol for reverse total shoulder    shower stall transfer  Supervision        Item retrieval for inc'd independence and safety negotiating home environment Devorah/I  4/8 4/8 MI   Bed mobility with HOB flat (pt reports possibly sleeping in recliner upon discharge; discharge goal when confirmed)  Devorah/I    4/8 min assist      Shabnam Hope  4/8/2021

## 2021-04-08 NOTE — PHYSICAL THERAPY NOTE
28' session     04/08/21 1205   PT Last Visit   PT Visit Date 04/08/21   Note Type   Note Type Treatment   Pain Assessment   Pain Assessment Tool 0-10   Pain Score 9   Pain Location/Orientation Orientation: Left; Location: Shoulder   Pain Onset/Description Onset: Ongoing; Descriptor: Aching;Descriptor: Sore   Patient's Stated Pain Goal No pain   Hospital Pain Intervention(s) Medication (See MAR); Cold applied   Restrictions/Precautions   LUE Weight Bearing Per Order NWB   Braces or Orthoses Sling   Other Precautions WBS; Fall Risk;Pain   General   Chart Reviewed Yes   Family/Caregiver Present No   Cognition   Overall Cognitive Status WFL   Arousal/Participation Alert; Cooperative   Attention Within functional limits   Following Commands Follows all commands and directions without difficulty   Subjective   Subjective i am a little tired - I didn't sleep well last night   Bed Mobility   Supine to Sit 5  Supervision   Sit to Supine 5  Supervision   Additional Comments R side of the bed- bed flat no rail   Transfers   Sit to Stand 5  Supervision   Stand to Sit 5  Supervision   Stand pivot 5  Supervision   Car transfer   (simulated  S)   Ambulation/Elevation   Gait pattern   (waddling gt with poor heel strike)   Gait Assistance 5  Supervision   Assistive Device None   Distance 105' x 2   Stair Management Assistance 5  Supervision   Stair Management Technique One rail L;Step to pattern  (pt states she will use back steps that have and l rail up)   Number of Stairs 4   Balance   Static Standing Fair   Dynamic Standing Fair   Ambulatory Fair -   Endurance Deficit   Endurance Deficit Yes   Activity Tolerance   Activity Tolerance Patient tolerated treatment well   Exercises   Balance training  pt amb to BR to wash hands- stood at sink with S and no UE support   Assessment   Prognosis Good   Problem List Decreased strength;Decreased range of motion;Decreased endurance;Decreased mobility;Orthopedic restrictions;Pain   Assessment Pt states she feels a little unsteady from not sleeping well - no Lob with amb but + waddling gt with decrease heel strike which pt reports is her baseline  Pt reports that she will be using steps in then back to enter home so rail will be on the L ascending   Pt did well gettign in and OOB but reports that she will be sleeping in the recliner   Barriers to Discharge Inaccessible home environment;Decreased caregiver support   Goals   Patient Goals go home   STG Expiration Date 04/14/21   PT Treatment Day 2   Plan   Treatment/Interventions   (cont as per Royce 41)   Progress Progressing toward goals   PT Frequency   (5-7x/wk)      Goals STG achieved within 1 weeks Performance at Initial Evaluation (4/8/2021) Last date completed      Bed mobility skills including rolling and repositioning to prevent skin breakdown and decrease caregiver burden            modified independent assistance       supervision assistance x1       4/7      Supine to sit transitions to increase ease of transfer, allow pt to get out of bed, and decrease caregiver burden          modified independent assistance       supervision assistance x1       4/8      Sit to supine transitions to increase ease of transfer, allow pt to get back into bed, and decrease caregiver burden          modified independent assistance       supervision assistance x1       4/8      Functional transfers to facilitate safe return to previous living environment        modified independent assistance    supervision assistance x1    4/8      Ambulation with least restrictive AD; including at least 2 turns for safe household distance ambulation          modified independent assistance       supervision assistance x1       4/8      Ascend/descend a full flight of steps with appropriate handrail, with device prn, for safe access to previous living environment     Pt has 2 entry ways one with RHR and one with LHR, practiced both on eval        modified independent assistance       supervision assistance x1       4/8  Pt reports that she has 3 BECCA and will be staying on the first floor      Improve standing functional balance to allow for pt to  object from floor            modified independent assistance       supervision assistance x1       4/7

## 2021-04-08 NOTE — NURSING NOTE
At about 2200 on 4/7/21, Pt complained of severe left shoulder pain  Pt in no visible distress and resting in bed  Vitals in Flowsheets  Pt informed that she had Oxycodone 5 mg prn every 4 hours, and that that was available for her to take now  Pt commented that the oxycodone had not been providing relief, and asked if something additional was available to help bring down the pain  Vasquez Conde contacted  Lidocaine patch ordered and administered   Pt noted that she had used lidocaine patches before, and that they have helped in the past

## 2021-04-08 NOTE — PLAN OF CARE
Problem: PHYSICAL THERAPY ADULT  Goal: Performs mobility at highest level of function for planned discharge setting  See evaluation for individualized goals  Description: Treatment/Interventions: Functional transfer training, LE strengthening/ROM, Elevations, Therapeutic exercise, Endurance training, Patient/family training, Equipment eval/education, Bed mobility, Gait training, Spoke to nursing, Spoke to case management, OT          See flowsheet documentation for full assessment, interventions and recommendations  Outcome: Progressing  Note: Prognosis: Good  Problem List: Decreased strength, Decreased range of motion, Decreased endurance, Decreased mobility, Orthopedic restrictions, Pain  Assessment: Pt states she feels a little unsteady from not sleeping well - no Lob with amb but + waddling gt with decrease heel strike which pt reports is her baseline  Pt reports that she will be using steps in then back to enter home so rail will be on the L ascending  Pt did well gettign in and OOB but reports that she will be sleeping in the recliner  Barriers to Discharge: Inaccessible home environment, Decreased caregiver support     PT Discharge Recommendation: Home with skilled therapy(when rehab goals met)          See flowsheet documentation for full assessment

## 2021-04-08 NOTE — PHYSICAL THERAPY NOTE
Physical Therapy Evaluation and Treatment    Patient's Name: Beronica Michaels    Admitting Diagnosis  S/P reverse total shoulder arthroplasty, left [Z96 [id number]]    Problem List  Patient Active Problem List   Diagnosis    [facility]    Hypokalemia    Vertigo    Fibromyalgia    Spinal stenosis of lumbar region    Osteoarthritis of lumbosacral spine without myelopathy    Migraine    HLD (hyperlipidemia)    Syncope    Orthostatic hypotension    History of TIAs    Intractable migraine    Neck pain    Low back pain    PE [facility]pulmonary thromboembolism) ([facility]    Hypertension    Bipolar disorder    Chronic back pain    DVT, lower extremity (HCC)    Anxiety    Leukocytosis    Chronic anticoagulation    Anemia    Ambulatory dysfunction    Edema    Right arm pain    Dizziness    Intractable migraine without aura and without status migrainosus    Closed head injury with brief loss of consciousness ([facility]    Shoulder pain    Class 2 obesity due to excess calories with body mass index (BMI) of 35 0 to 35 9 in adult    Closed fracture of distal ends of left radius and ulna    Fall down stairs    Fracture of multiple ribs of right side    Hematoma of parietal scalp    Bilateral pulmonary contusion    History of anxiety    History of pulmonary embolism    Right distal ulnar fracture    Status post total shoulder arthroplasty, left    Aftercare following surgery of the musculoskeletal system, NEC       Past Medical History  Past Medical History:   Diagnosis Date    Adrenal insufficiency ([place]'s disease) ([facility] )     Bipolar disorder     C  difficile diarrhea     Cervical radiculopathy     Chronic back pain     Chronic pain disorder     lumbar    DVT, lower extremity ([facility] ) 1991    Fibromyalgia     History of TIAs     cannot remember details    Hypertension     Hypokalemia     Migraine     MRSA (methicillin resistant Staphylococcus aureus) 07/20/2012    nasal swab negative 4/5/19    Psychiatric disorder     Anxiety, major depression, bipolar    Spinal stenosis     Stroke [facility] )     tia    Syncope 2014    orthostatic hypotension    Wears dentures     upper       Past Surgical History  Past Surgical History:   Procedure Laterality Date    APPENDECTOMY      CAST APPLICATION Left 8/0/1512    Procedure: Application short-arm splint;  Surgeon: Hank Diaz MD;  [facility];  Service: Orthopedics    COLONOSCOPY      GASTRIC RESTRICTION SURGERY      Gastric Sleeve Nov 2015    HYSTERECTOMY      DONALD    JOINT REPLACEMENT      Left Knee 2011 and Right Hip 2010    ORIF WRIST FRACTURE Left 7/4/2020    Procedure: Open reduction and internal fixation left radius and ulnar shaft fracture;  Surgeon: Hank Diaz MD;  [facility];  Service: Orthopedics    OK RECONSTR TOTAL SHOULDER IMPLANT Left 3/30/2021    Procedure: ARTHROPLASTY SHOULDER - anatomic;  Surgeon: Aguila Velasquez MD;  [facility];  Service: Orthopedics    WISDOM TOOTH EXTRACTION         Recent Imaging  No orders to display       Recent Vital Signs  Vitals:    04/07/21 1526 04/07/21 2205 04/07/21 2300 04/08/21 0553   BP: 107/64 (!) 102/45 149/68    BP Location: Left arm Right arm Right arm    Pulse: 82 88 84    Resp: 20 14 20    Temp: (!) 97 2 °F (36 2 °C)  99 °F (37 2 °C)    TempSrc: Temporal  Temporal    SpO2: 98% 96% 93%    Weight:    109 kg (240 lb 4 8 oz)   Height:           Standardized Assessments  [facility] 6-Click: 40/49    Home Evaluation (virtual) and Family Training  will address as appropriate       04/07/21 1335   PT Last Visit   PT Visit Date 04/07/21   Note Type   Note type Evaluation   Pain Assessment   Pain Assessment Tool 0-10   Pain Score 9   Pain Location/Orientation Orientation: Left; Location: Shoulder   Hospital Pain Intervention(s) Medication (See MAR); Ambulation/increased activity;Repositioned   Home Living   Type of [address] Multi-level; Able to live on main level with bedroom/bathroom;Stairs to enter with rails  (3 BECCA Back L side, front R side rail)   Bathroom Shower/Tub Walk-in shower   Bathroom Toilet Raised   Bathroom Equipment Shower chair;Grab bars in shower   Bathroom Accessibility Accessible   Home Equipment Walker;Cane   Additional Comments ind with no AD   Prior Function   Level of [place] Independent with ADLs and functional mobility   Lives With [place] Help From Family   ADL Assistance Independent   IADLs Independent   Falls in the last 6 months 1 to 4   Vocational Retired   Comments dtr will stay with pt for 2 weeks; ind with all ADLs at baseline   Restrictions/Precautions   Weight Bearing Precautions Per Order Yes   LUE Weight Bearing Per Order NWB   Braces or Orthoses Sling  (abd brace)   Other Precautions WBS;Pain   General   Family/Caregiver Present No   Cognition   Overall Cognitive Status WFL   Arousal/Participation Alert   Attention Within functional limits   Orientation Level Oriented X4   Memory Within functional limits   Following Commands Follows one step commands without difficulty   Strength RLE   RLE Overall Strength 4/5   Strength LLE   LLE Overall Strength 4/5   Coordination   Movements are Fluid and Coordinated 1   Sensation WFL   Light Touch   RLE Light Touch Grossly intact   LLE Light Touch Grossly intact   Bed Mobility   Supine to Sit 5  Supervision   Additional items Assist x 1;Bedrails; Increased time required;Verbal cues   Sit to Supine 5  Supervision   Additional items Assist x 1;Bedrails; Increased time required;Verbal cues   Transfers   Sit to Stand 5  Supervision   Additional items Assist x 1; Armrests; Increased time required;Verbal cues   Stand to Sit 5  Supervision   Additional items Assist x 1; Armrests; Increased time required;Verbal cues   Additional Comments no AD   Ambulation/Elevation   Gait pattern Short stride;Decreased foot clearance   Gait Assistance 5  Supervision   Additional items Verbal cues Assistive Device None   Distance 100ft   Stair Management Assistance 5  Supervision   Additional items Assist x 1;Verbal cues; Increased time required   Stair Management Technique One rail L;Step to pattern; Foreward   Number of Stairs 2   Balance   Static Sitting Fair +   Dynamic Sitting Fair +   Static Standing Fair   Dynamic Standing Fair   Ambulatory Fair   Endurance Deficit   Endurance Deficit Yes   Activity Tolerance   Activity Tolerance Patient tolerated treatment well   Medical Staff Made Aware spoke to CM   Nurse Made Aware spoke to RN   Assessment   Prognosis Good   Problem List Decreased strength;Decreased endurance; Impaired balance;Decreased mobility; Decreased range of motion;Pain;Obesity;Orthopedic restrictions   Barriers to Discharge Inaccessible home environment   Goals   Patient Goals to get home by next wednesday   STG Expiration Date 04/14/21   Short Term Goal #1 see grid   PT Treatment Day 1   Plan   Treatment/Interventions Functional transfer training;LE strengthening/ROM; Elevations; Therapeutic exercise; Endurance training;Patient/family training;Equipment eval/education; Bed mobility;Gait training;Spoke to nursing;Spoke to case management;OT   PT Frequency   (5-7x/wk)   Recommendation   PT Discharge Recommendation Home with skilled therapy  (when rehab goals met)   AM-PAC Basic Mobility Inpatient   Turning in Bed Without Bedrails 4   Lying on Back to Sitting on Edge of Flat Bed 4   Moving Bed to Chair 4   Standing Up From Chair 4   Walk in Room 4   Climb 3-5 Stairs 3   Basic Mobility Inpatient Raw Score 23   Basic Mobility Standardized Score 50 88       Assessment                                                                                                                         Javier Carcamo is a 56 y o  female admitted to [facility] on 4/6/2021 for Aftercare following surgery of the musculoskeletal system, NEC   Pt  has a past medical history of Adrenal insufficiency ([place]'s disease) ([facility] ), Bipolar disorder, C  difficile diarrhea, Cervical radiculopathy, Chronic back pain, Chronic pain disorder, DVT, lower extremity (HCC) (), Fibromyalgia, History of TIAs, Hypertension, Hypokalemia, Migraine, MRSA (methicillin resistant Staphylococcus aureus) (2012), Psychiatric disorder, Spinal stenosis, Stroke [facility]), Syncope (), and Wears dentures    PT was consulted and pt was seen on 2021 for mobility assessment and d/c planning  Pt presents seated in recliner alert and agreeable to therapy  She is reporting pain in the L shoulder  Sensation is intact to BLE no numbness/tingling  Strength is WFL BLE  Reports she needs a R GIOVANNA revision however this does not cause her pain  Pt with fair balance with ambulation and transfers with no device  No SOB/BECKFORD with activity  Maintains LUE weight bearing well  Pt is currently functioning at a supervision assistance x1 level for bed mobility, supervision assistance x1 level for transfers, supervision assistance x1 level for ambulation with no assistive device, and supervision assistance x1 for elevations  Pt will benefit from continued skilled IP PT to address the above mentioned impairments  in order to maximize recovery and increase functional independence when completing mobility and ADLs  Currently PT recommendations for DME include none  At this time PT recommendations for d/c are home with skilled therapy when rehab goals met          PT GG Scores  Roll left and right:  4) Supervision or touching) verbal cues and/or touching, steadying, contact guard      Sit to [place]) Supervision or touching) verbal cues and/or touching, steadying, contact guard      Lying to Sit EOB:  4) Supervision or touching) verbal cues and/or touching, steadying, contact guard      Sit to Stand:  4) Supervision or touching) verbal cues and/or touching, steadying, contact guard      Chair/Bed to chair Transfer:  4) Supervision or touching) verbal cues and/or touching, steadying, contact guard      Toilet Transfer:  4) Supervision or touching) verbal cues and/or touching, steadying, contact guard      Walk 10ft 4) Supervision or touching) verbal cues and/or touching, steadying, contact guard      Walk 50ft with 2 turns 4) Supervision or touching) verbal cues and/or touching, steadying, contact guard      Walk 150ft 4) Supervision or touching) verbal cues and/or touching, steadying, contact guard      Wheel 50ft with 2 turns 9) Not Applicable) pt did not complete PTA     Wheel [id number] 9) Not Applicable) pt did not complete PTA     Car transfer 4) Supervision or touching) verbal cues and/or touching, steadying, contact guard      Walk 10ft over uneven ground 4) Supervision or touching) verbal cues and/or touching, steadying, contact guard      1 step 4) Supervision or touching) verbal cues and/or touching, steadying, contact guard      4 steps 4) Supervision or touching) verbal cues and/or touching, steadying, contact guard      12 steps 88) Not attempted due to medical condition or safety concerns     Pick object from floor 4) Supervision or touching) verbal cues and/or touching, steadying, contact guard          Physical Therapy Treatment                                  (40 minutes)                                                        04/07/21 1350   Transfers   Sit to Stand 5  Supervision   Additional items Assist x 1; Armrests; Increased time required;Verbal cues   Stand to Sit 5  Supervision   Additional items Assist x 1; Armrests; Increased time required;Verbal cues   Toilet transfer 5  Supervision   Additional items Assist x 1; Armrests; Increased time required;Verbal cues;Standard toilet   Car transfer 5  Supervision   Additional items Assist x 1; Armrests; Increased time required;Verbal cues   Additional Comments no AD   Ambulation/Elevation   Gait pattern Short stride;Decreased foot clearance   Gait Assistance 5  Supervision   Additional items Verbal cues   Assistive Device None Distance 100ft, 150ft, 50ft level tile; 30ft uneven surface   Stair Management Assistance 5  Supervision   Additional items Assist x 1;Verbal cues; Increased time required   Stair Management Technique One rail R;One rail L;Step to pattern; Foreward; Sideways  (tried different techniques for different steps )   Number of Stairs 6   Balance   Static Sitting Fair +   Dynamic Sitting Fair +   Static Standing Fair   Dynamic Standing [address]   Ambulatory Fair   Exercises   Balance training  Picking Item off Ground) supervision       Goals STG achieved within 1 weeks Performance at Initial Evaluation (4/8/2021) Last date completed     Bed mobility skills including rolling and repositioning to prevent skin breakdown and decrease caregiver burden  modified independent assistance     supervision assistance x1     4/7     Supine to sit transitions to increase ease of transfer, allow pt to get out of bed, and decrease caregiver burden       modified independent assistance     supervision assistance x1     4/7     Sit to supine transitions to increase ease of transfer, allow pt to get back into bed, and decrease caregiver burden       modified independent assistance     supervision assistance x1     4/7     Functional transfers to facilitate safe return to previous living environment      modified independent assistance   supervision assistance x1   4/7     Ambulation with least restrictive AD; including at least 2 turns for safe household distance ambulation       modified independent assistance     supervision assistance x1     4/7     Ascend/descend a full flight of steps with appropriate handrail, with device prn, for safe access to previous living environment    Pt has 2 entry ways one with RHR and one with LHR, practiced both on eval      modified independent assistance     supervision assistance x1     4/7     Improve standing functional balance to allow for pt to  object from floor          modified independent assistance     supervision assistance x1     4/7     Derrek Marking PT, DPT

## 2021-04-08 NOTE — CASE MANAGEMENT
SW met with patient to review general assessment and admission paperwork  SW also wanted to discuss [facility] meeting timing  Patient presented as alert during conversation  Patient reported that she lives with  Chance Cuellar, a , in a 3 story home with 3 steps to enter  Patient reportedly independent PTA  Patient has a cane, RW, shower chiar, and BSC at home  Patient has [facility] of HHC [facility]  Patient anticipated to need outpatient therapy at time of dc  For [facility] meeting, patient explains that  Chance Cuellar is a  and is not available via phone to call  She reports that daughter Nida Lopez who will be staying with her lives in [place] and works at [employer], and not always available  Sw attempted to call Nida Lopez, but only daughter Rachel's number available  BEN called and spoke with Rachel Ramos reports that Nida Lopez plans on coming to stay with patient on May1st  She states that was the discussion as of last weekend with her sister  Rachel reports there might be changes, but she was told that May 1st was the day  Rachel reports that Chance Cuellar is usually done with his job between 2pm-3pm everyday  SW called and left a voice message for Chance Cuellar  SW will discuss with patient  BEN met with patient again after her therapy session to discuss [facility] meeting and dc plan  BEN advised patient that her insurance is requesting an update from rehab unit  BEN informed patient that insurance might deny any extended stay  BEN also presented concern issued by Rachel that Phani Katz now wont be able to provide care until May 1st  Patient seemed somewhat surprised by information but will follow up with Phani Katz when she is done with work  Patient reported that she would be okay with insurance does not extend since she and  are trying to work out a plan for her to be home  BEN did inform patient that she called and left a message for patient inviting him to meeting  Patient remains unsure if  will be able to attend   SW received consent to send invitation to Rachel while awaiting determination from insurance company  BEN will follow  BEN later received a return phone call from patient's   He stated that he would be available at 1:30pm to have  meeting  He is also aware that patient is due for an insurance review tomorrow  He is aware that insurance may present a last cover date  BEN will follow

## 2021-04-08 NOTE — OCCUPATIONAL THERAPY NOTE
Occupational Therapy Note    Spoke to MD Kimber Woo in regards to [address] protocol (via [email or web address] 4/7/2021)  Office to fax Total Shoulder Arthoplasty protocol to [facility] rehab  Until received,  OT to follow MD Pozo's TSA protocol + "limit passive ER to no more than 30 degrees for the first 6 weeks "  Plan and progress to date has been communicated to MAN for continuity of care delivery       Lopez Otero Junior 85, OTR/L

## 2021-04-08 NOTE — PLAN OF CARE
Problem: PHYSICAL THERAPY ADULT  Goal: Performs mobility at highest level of function for planned discharge setting  See evaluation for individualized goals  Description: Treatment/Interventions: Functional transfer training, LE strengthening/ROM, Elevations, Therapeutic exercise, Endurance training, Patient/family training, Equipment eval/education, Bed mobility, Gait training, Spoke to nursing, Spoke to case management, OT          See flowsheet documentation for full assessment, interventions and recommendations  Outcome: Progressing  Note: Prognosis: Good  Problem List: Decreased strength, Decreased endurance, Impaired balance, Decreased mobility, Decreased range of motion, Pain, Obesity, Orthopedic restrictions     Barriers to Discharge: Inaccessible home environment     PT Discharge Recommendation: Home with skilled therapy(when rehab goals met)          See flowsheet documentation for full assessment

## 2021-04-09 LAB
FLUAV RNA RESP QL NAA+PROBE: NEGATIVE
FLUBV RNA RESP QL NAA+PROBE: NEGATIVE
RSV RNA RESP QL NAA+PROBE: NEGATIVE
SARS-COV-2 RNA RESP QL NAA+PROBE: NEGATIVE

## 2021-04-09 PROCEDURE — 97530 THERAPEUTIC ACTIVITIES: CPT

## 2021-04-09 PROCEDURE — 0241U HB NFCT DS VIR RESP RNA 4 TRGT: CPT | Performed by: INTERNAL MEDICINE

## 2021-04-09 PROCEDURE — 97110 THERAPEUTIC EXERCISES: CPT

## 2021-04-09 PROCEDURE — 97535 SELF CARE MNGMENT TRAINING: CPT

## 2021-04-09 PROCEDURE — 97116 GAIT TRAINING THERAPY: CPT

## 2021-04-09 RX ADMIN — PANTOPRAZOLE SODIUM 40 MG: 40 TABLET, DELAYED RELEASE ORAL at 05:59

## 2021-04-09 RX ADMIN — OXYCODONE HYDROCHLORIDE 5 MG: 5 TABLET ORAL at 17:03

## 2021-04-09 RX ADMIN — LAMOTRIGINE 200 MG: 100 TABLET ORAL at 08:46

## 2021-04-09 RX ADMIN — OXYCODONE HYDROCHLORIDE 5 MG: 5 TABLET ORAL at 00:02

## 2021-04-09 RX ADMIN — ACETAMINOPHEN 975 MG: 325 TABLET ORAL at 14:39

## 2021-04-09 RX ADMIN — OXYCODONE HYDROCHLORIDE 5 MG: 5 TABLET ORAL at 11:54

## 2021-04-09 RX ADMIN — APIXABAN 5 MG: 5 TABLET, FILM COATED ORAL at 17:59

## 2021-04-09 RX ADMIN — FLUVOXAMINE MALEATE 300 MG: 50 TABLET ORAL at 21:39

## 2021-04-09 RX ADMIN — GABAPENTIN 300 MG: 300 CAPSULE ORAL at 08:46

## 2021-04-09 RX ADMIN — OXYCODONE HYDROCHLORIDE 5 MG: 5 TABLET ORAL at 21:38

## 2021-04-09 RX ADMIN — LORAZEPAM 2 MG: 1 TABLET ORAL at 17:57

## 2021-04-09 RX ADMIN — OXYCODONE HYDROCHLORIDE 5 MG: 5 TABLET ORAL at 04:08

## 2021-04-09 RX ADMIN — METHOCARBAMOL 500 MG: 500 TABLET, FILM COATED ORAL at 11:54

## 2021-04-09 RX ADMIN — LORAZEPAM 2 MG: 1 TABLET ORAL at 08:45

## 2021-04-09 RX ADMIN — ACETAMINOPHEN 975 MG: 325 TABLET ORAL at 05:59

## 2021-04-09 RX ADMIN — DOCUSATE SODIUM 50 MG AND SENNOSIDES 8.6 MG 1 TABLET: 8.6; 5 TABLET, FILM COATED ORAL at 21:39

## 2021-04-09 RX ADMIN — GABAPENTIN 300 MG: 300 CAPSULE ORAL at 15:57

## 2021-04-09 RX ADMIN — NYSTATIN 500000 UNITS: 100000 SUSPENSION ORAL at 11:54

## 2021-04-09 RX ADMIN — NYSTATIN 500000 UNITS: 100000 SUSPENSION ORAL at 21:39

## 2021-04-09 RX ADMIN — Medication 2000 UNITS: at 08:46

## 2021-04-09 RX ADMIN — METHOCARBAMOL 500 MG: 500 TABLET, FILM COATED ORAL at 00:02

## 2021-04-09 RX ADMIN — METHOCARBAMOL 500 MG: 500 TABLET, FILM COATED ORAL at 23:21

## 2021-04-09 RX ADMIN — ARIPIPRAZOLE 5 MG: 5 TABLET ORAL at 08:46

## 2021-04-09 RX ADMIN — APIXABAN 5 MG: 5 TABLET, FILM COATED ORAL at 08:46

## 2021-04-09 RX ADMIN — GABAPENTIN 300 MG: 300 CAPSULE ORAL at 20:32

## 2021-04-09 RX ADMIN — NYSTATIN 500000 UNITS: 100000 SUSPENSION ORAL at 08:45

## 2021-04-09 RX ADMIN — LIDOCAINE 1 PATCH: 50 PATCH TOPICAL at 20:33

## 2021-04-09 RX ADMIN — METHOCARBAMOL 500 MG: 500 TABLET, FILM COATED ORAL at 05:59

## 2021-04-09 RX ADMIN — POTASSIUM CHLORIDE 40 MEQ: 20 TABLET, EXTENDED RELEASE ORAL at 08:45

## 2021-04-09 RX ADMIN — METHOCARBAMOL 500 MG: 500 TABLET, FILM COATED ORAL at 17:57

## 2021-04-09 RX ADMIN — NYSTATIN 500000 UNITS: 100000 SUSPENSION ORAL at 17:57

## 2021-04-09 RX ADMIN — ACETAMINOPHEN 975 MG: 325 TABLET ORAL at 21:38

## 2021-04-09 NOTE — OCCUPATIONAL THERAPY NOTE
Occupational Therapy Treatment Note     Name:  Kam Serna   MRN:   [id number]  Age:     56 y o        Patient Active Problem List   Diagnosis    Bipolar depression ([facility] [address] )    Hypokalemia    Vertigo    Fibromyalgia    Spinal stenosis of lumbar region    Osteoarthritis of lumbosacral spine without myelopathy    Migraine    HLD (hyperlipidemia)    Syncope    Orthostatic hypotension    History of TIAs    Intractable migraine    Neck pain    Low back pain    PE (pulmonary thromboembolism) ([facility] [address] )    Hypertension    Bipolar disorder    Chronic back pain    DVT, lower extremity (HCC)    Anxiety    Leukocytosis    Chronic anticoagulation    Anemia    Ambulatory dysfunction    Edema    Right arm pain    Dizziness    Intractable migraine without aura and without status migrainosus    Closed head injury with brief loss of consciousness (HCC)    Shoulder pain    Class 2 obesity due to excess calories with body mass index (BMI) of 35 0 to 35 9 in adult    Closed fracture of distal ends of left radius and ulna    Fall down stairs    Fracture of multiple ribs of right side    Hematoma of parietal scalp    Bilateral pulmonary contusion    History of anxiety    History of pulmonary embolism    Right distal ulnar fracture    Status post total shoulder arthroplasty, left    Aftercare following surgery of the musculoskeletal system, NEC      S/P reverse total shoulder arthroplasty, left [X04 [id number]]        Subjective/Goals: "I know my  can help me when he gets home and my daughter comes in 2 weeks"     Vitals: stable      Pain: 6/10 left shoulder      Treatment Time: (825-938) 71 minutes + ([id number]) 45 minutes = 116 total treatment minutes this date     Cognition: WFL     Precautions: NWB LUE (abduction sling), Pain      EVALUATION information:  · OT Goal expiration date: 4/14/21  · Treatment day: 3  · Discharge recommendation: home with skilled therapy   · HOME SET UP:  ? House- [facility] level on main level with bed/bath  ? Stairs to enter + rails  ? Walk In shower + shower chair and GB in shower  ? DME: walker, cane  ? Lives with spouse who works nights (home 3PM next day) + assist from family     Patient education: CARRY OVER OF WB STATUS, DEEP BREATHING TECHNIQUES T/O ACTIVITY, SLOWING OF PACE, ENERGY CONSERVATION TECHNIQUES FOR CARRY OVER UPON D/C, INCREASED FAMILY SUPPORT, CONTINUE PARTICIPATION IN SELF-CARE/MOBILITY WITH STAFF WHILE IN [facility] [address] , OVERALL SAFETY AWARENESS, FALL PREVENTION, ENERGY CONSERVATION  and PAIN MANAGEMENT WITH FUNCTIONAL ACTIVITIES     Additional comments: Pt is a 60 y  o  female seen for OT evaluation s/p admit to [facility] [place] 4/6/2021 s/p L TSA with Long Head Biceps Tenodesis performed by Dr Estelle Botello 3/30/2021  [place] [facility] [place] in place  To follow standard TSA protocol (contacted María for appropriate protocol, provided fax information)      Assessment/Additional session details: Patient seen this date for OT with focus on goals as set by OTR  Patient agreeable to skilled OT session with focus on ADL's (bathing, dressing, toileting), Transfers (STS, SPT), Standing tolerance/balance, Strength/ROM/HEP, Activity tolerance, Education on safety, fall prevention and energy conservation techniques, Item retrieval/safe transport with DME ed, Compensatory techniques for follow of WBS and Bathroom/kitchen/home mobility  Barriers to treatment include fatigue, pain, weight bearing, home environment (management in/out or throughout home), social/family support, decreased strength/coordination, balance , activity tolerance and standing tolerance  Patient educated on safe functional transfers techniques, the appropriate use of AE/DME to improve functional performance, and activity modification techniques for energy conservation  Plans for d/c are home with home OT and family support   Patient is making gains toward OT goals with continued OT recommended at this time to max tolerance, safety and function for appropriate d/c planning  At end of session patient remains in room seated at recliner with all needs within reach  Patient seen for an ADL again this AM with continued focus on 1 handed techniques, recommendations for max potential when home alone, level of assist needed, benefits for OP services when able, and AE training  General info and ed from session and ed provided to patient on needs via Colette Hermelinda Mustafa with spouse via phone:  · Bed mobility:  Patient states that he sleeps in a recliner as needed and plans to continue upon d/c as needed but does have a bed cane on a lizzie size bed on the 3rd floor if needed and can use PRN  · Bathing/dressing:  Assist needed for bathing back, skin folds and right Upper arm otherwise patient demonstrates functioning upon set up  Dressing:  Ed on pull over dresses and management-- simulated with hospital gown with min assist and cues  Repots she has for home some options and can obtain more as needed  If needing to fully dress she states she would have assist as needed with  or daughter  Shoes she stats will have assist when exiting home but ed on elastic laces as needed however focus on management of slipper socks with sock horse-- supervision + v/c  Dressing stick benefit for dressing  · Discussed OP vs home therapy with patient hoping for home for 2 weeks until daughter arrives and then OP  Ed to discuses with surgeon for possible transportation assist for OP or script and knowledge when to start OP    · Daughter to come to assist by May 1st     · Written info provided to patient of needs and recommendations for better recall and carryover        Goals STG achieved within 1  Week  Performance at Initial Evaluation 4/7/2021  Current Performance (last date completed)   Feeding (cutting, opening containers, etc)  Devorah/I Min A 4/9 set up needed    Grooming while standing at sink Devorah/I Min A (seated)   4/9 MI hand washing   ADL transfers  Devorah/I  4/8, 4/9 Supervision   4/9 MI   Bathroom mobility with appropriate AD Devorah/I  4/8, 4/9 Supervision   4/9 MI   UB ADL  Devorah/I Mod A  4/9 doff sling MI, don mod assist + cues  Overhead gown min assist + v/c dressing  Min bathing UE   4/8 mod assist + ed except brace max assist    LB ADL, AE PRN Devorah/I Mod A  4/9 + AE Supervision with cues socks and underwear (except CM- see toileting)   4/8 supervision + cues except socks mod assist and max shoes    Toileting/clothing management and hygiene Devorah/I  *4/9 Mod A  *4/9 with underwear management mod assist 2* sweating    Patient ed on management of loose fitting underwear-- later PM MI without underwear to manage and gown only    4/8 Supervision   Dynamic standing balance for increased safety when completing purposeful tasks F+  4/8, 4/9 F  4/9 Fair+   Increase standing tolerance for inc'd safety with standing purposeful tasks >5 min   4/9 2 min   4/9 5 minutes   Participate in therex 3-5x/week for inc'd overall stamina/activity tolerance for purposeful tasks (per María protocol)  To be completed    4/9 good tolerance to week 1 protocol for reverse total shoulder-- ice provided post session     shower stall transfer  Supervision4/9 4/9 simulated with activity supervision-- may not be able to shower upon d/c and instructed to talk with surgeon     Item retrieval for inc'd independence and safety negotiating home environment Devorah/I  4/8, 4/9 4/9  MI   Bed mobility with [facility] flat (pt reports possibly sleeping in recliner upon discharge; discharge goal when confirmed)  Devorah/I   4/9 MI + rail from right (states rail at home but plans use of recliner)   4/8 min assist from left      Roger Sanchez  4/9/2021

## 2021-04-09 NOTE — PROGRESS NOTES
Medication Regimen Review (MRR)    To promote positive health outcomes and reduce adverse consequences the patient's medication therapy has been reviewed by a pharmacist for the following potential problems:   1   documented indication and therapeutic benefits  2   appropriate dose, frequency, route, and duration of therapy  3   medication interactions, side effects, and allergies  4   medication or transcription errors  Medications are also reviewed for appropriate monitoring, duplicate therapy, and dose reduction  Based on the review please see the following recommendations  Patient information:    The patient is 56 y o  admitted for rehab following left shoulder total arthroplasty on 3/30/2021      Wt Readings from Last 1 Encounters:   04/08/21 109 kg (240 lb 4 8 oz)       Lab Results   Component Value Date    GLUCOSE 86 07/04/2020    CALCIUM 9 0 04/06/2021     07/07/2015    K 4 1 04/06/2021    CO2 28 04/06/2021     04/06/2021    BUN 13 04/06/2021    CREATININE 0 73 04/06/2021         Recommendations: There are no recommendations from the pharmacy department at this time      Champ Burgos, Pharmacist  [phone]

## 2021-04-09 NOTE — PLAN OF CARE
Problem: PHYSICAL THERAPY ADULT  Goal: Performs mobility at highest level of function for planned discharge setting  See evaluation for individualized goals  Description: Treatment/Interventions: Functional transfer training, LE strengthening/ROM, Elevations, Therapeutic exercise, Endurance training, Patient/family training, Equipment eval/education, Bed mobility, Gait training, Spoke to nursing, Spoke to case management, OT          See flowsheet documentation for full assessment, interventions and recommendations  Outcome: Progressing  Note: Prognosis: Good  Problem List: Decreased strength, Decreased range of motion, Decreased endurance, Decreased mobility, Orthopedic restrictions, Pain  Assessment: Pt seen for PT treatment session  Pt agreeable to PT  Pts gait is fairly steady w/o AD  Pt amb w/ waddling type gait  Pt performed steps per flow sheet  Ptreported she plans on using her back steps 2* the back steps having a sturdy wooden rail  Pt reports this rail is on the L side  Barriers to Discharge: Inaccessible home environment, Decreased caregiver support     PT Discharge Recommendation: Home with skilled therapy(when rehab goals met)          See flowsheet documentation for full assessment

## 2021-04-09 NOTE — PHYSICAL THERAPY NOTE
53 minute treatment       04/09/21 1227   PT Last Visit   PT Visit Date 04/09/21   Note Type   Note Type Treatment   Pain Assessment   Pain Assessment Tool 0-10   Pain Score 7   Pain Location/Orientation Orientation: Left; Location: Shoulder   Pain Onset/Description Onset: Ongoing   Hospital Pain Intervention(s) Heat applied;Repositioned; Ambulation/increased activity; Emotional support; Environmental changes   Restrictions/Precautions   Weight Bearing Precautions Per Order Yes   LUE Weight Bearing Per Order NWB   Braces or Orthoses Sling   Other Precautions WBS; Fall Risk;Fluid restriction   General   Chart Reviewed Yes   Family/Caregiver Present No   Cognition   Overall Cognitive Status WFL   Following Commands Follows all commands and directions without difficulty   Subjective   Subjective I want to work on the car transfer again  I want to make sure I can do it   Bed Mobility   Supine to Sit 5  Supervision   Transfers   Sit to Stand 5  Supervision   Stand to Sit 5  Supervision   Stand pivot 5  Supervision   Car transfer 5  Supervision   Additional items   (simulated car transfer)   Ambulation/Elevation   Gait pattern Improper Weight shift;Decreased foot clearance; Wide ZEESHAN   Gait Assistance 5  Supervision   Assistive Device None   Distance 105' x 2   Stair Management Assistance   (CS)   Stair Management Technique One rail L;Sideways; Foreward; Step to pattern  (Pt reports she wants to use back steps 2* rail being sturdy)   Number of Stairs 8   Balance   Ambulatory Fair -   Endurance Deficit   Endurance Deficit Yes   Activity Tolerance   Activity Tolerance Patient tolerated treatment well   Exercises   Hip Flexion Sitting;20 reps;AROM; Bilateral   Knee AROM Long Arc Quad Sitting;20 reps;AROM; Bilateral   Ankle Pumps Sitting;20 reps;AROM; Bilateral   Assessment   Prognosis Good   Problem List Decreased strength;Decreased range of motion;Decreased endurance;Decreased mobility;Orthopedic restrictions;Pain   Assessment Pt seen for PT treatment session  Pt agreeable to PT  Pts gait is fairly steady w/o AD  Pt amb w/ waddling type gait  Pt performed steps per flow sheet  Ptreported she plans on using her back steps 2* the back steps having a sturdy wooden rail  Pt reports this rail is on the L side      Barriers to Discharge Inaccessible home environment;Decreased caregiver support   Goals   Patient Goals go home   STG Expiration Date 04/14/21   PT Treatment Day 3   Plan   Treatment/Interventions   (Continue per plan of care)   Progress Progressing toward goals   PT Frequency   (5-7x/week)     Goals STG achieved within 1 weeks Performance at Initial Evaluation (4/8/2021) Last date completed      Bed mobility skills including rolling and repositioning to prevent skin breakdown and decrease caregiver burden            modified independent assistance       supervision assistance x1       4/7      Supine to sit transitions to increase ease of transfer, allow pt to get out of bed, and decrease caregiver burden          modified independent assistance       supervision assistance x1       4/9      Sit to supine transitions to increase ease of transfer, allow pt to get back into bed, and decrease caregiver burden          modified independent assistance       supervision assistance x1       4/8      Functional transfers to facilitate safe return to previous living environment        modified independent assistance    supervision assistance x1    4/9      Ambulation with least restrictive AD; including at least 2 turns for safe household distance ambulation          modified independent assistance       supervision assistance x1       4/9      Ascend/descend a full flight of steps with appropriate handrail, with device prn, for safe access to previous living environment     Pt has 2 entry ways one with RHR and one with LHR, practiced both on eval        modified independent assistance       supervision assistance x1    4/9   (4/8  Pt reports that she has 3 BECCA and will be staying on the first floor)      Improve standing functional balance to allow for pt to  object from floor            modified independent assistance       supervision assistance x1       4/   Colletta Alderman, PTA

## 2021-04-09 NOTE — UTILIZATION REVIEW
DATE:   4/9/21          AUTH#  [id number]          MEDICAL:      Eduin Ferris MD   Physician   Hospitalist   H&P       Signed   Date of Service:  4/6/2021  5:51 PM               Signed        Expand All Collapse All      Jayce Andrews 95 1960, 56 y o  female MRN: [id number]  Unit/Bed#: -01 Encounter: [id number]  Primary Care Provider: Naty Mike MD   Date and time admitted to hospital: 4/6/2021 12:52 PM     * Aftercare following surgery of the musculoskeletal system, NEC  Assessment & Plan  She has been having osteoarthritis of left shoulder status post total arthroplasty of left shoulder on 03/30/2021  Postoperatively noted to have anemia hemoglobin dropped from 13-10 currently  Otherwise no major complication  Physical therapy evaluated the patient and recommended rehab given decrease in drains and gait imbalance         Will consult PT/OT for strengthening and gait training  Scheduled Robaxin  Pain management  Supportive care  On Eliquis or history of DVT will continue     History of pulmonary embolism  Assessment & Plan  Resume pre-hospital Eliquis      Anemia  Assessment & Plan  Hemoglobin baseline 13 preoperatively current around 10 will continue to monitor weekly      Anxiety  Assessment & Plan  Resume pre-hospital Ativan      Intractable migraine  Assessment & Plan  [facility]up at [facility]  Continue lamotrigine      Vertigo  Assessment & Plan  Continue pre-hospital meclizine      Bipolar depression ([facility] [address] )  Assessment & Plan  Resume pre-hospital Abilify and Luvox         VTE Prophylaxis: Apixaban (Eliquis)  / sequential compression device   Code Status: Full code  POLST: POLST form is not discussed and not completed at this time    Discussion with family: Discussed with patient       Anticipated Length of Stay:  Patient will be admitted on an SNF Short Term Inpatient basis with an anticipated length of stay of  More than  2 midnights  Justification for Hospital Stay: Rehab       Total Time for Visit, including Counseling / Coordination of Care: 45 minutes  Greater than 50% of this total time spent on direct patient counseling and coordination of care      Chief Complaint:   Left shoulder pain     History of Present Illness:     Antonino Coughlin is a 56 y o  female who presents with PMH of bipolar disorder with anxiety, PE, DVT, migraine and obesity admitted to rehab following left shoulder arthroplasty due to decrease gait balance      She was hospitalized for elective left total shoulder arthroplasty due to osteoarthritis and had surgery on 3/30/21 at [facility]  Postoperative anemia noted with Hb 10 from 13 otherwise she had uneventful hospitalization  She was noted to have decrease balance and endurance hence rehab recommended      At the time of my eval she was complaining of left shoulder pain at the surgical site otherwise had no complains            Review of Systems:     Review of Systems   Constitutional: Negative for chills and fever  HENT: Negative for rhinorrhea and sore throat  Eyes: Negative for pain and visual disturbance  Respiratory: Negative for cough and shortness of breath  Cardiovascular: Negative for chest pain and palpitations  Gastrointestinal: Negative for nausea and vomiting  Genitourinary: Negative for difficulty urinating and dysuria  Musculoskeletal: Positive for arthralgias and gait problem  Negative for back pain  Skin: Negative for color change and rash  Neurological: Negative for light-headedness and headaches     All other systems reviewed and are negative         Past Medical and Surgical History:      Medical History        Past Medical History:   Diagnosis Date    Adrenal insufficiency (Andrés's disease) (HCC)      Bipolar disorder      C  difficile diarrhea      Cervical radiculopathy      Chronic back pain      Chronic pain disorder       lumbar    DVT, lower extremity ([address] ) 1991    Fibromyalgia      History of TIAs       cannot remember details    Hypertension      Hypokalemia      Migraine      MRSA (methicillin resistant Staphylococcus aureus) 07/20/2012     nasal swab negative 4/5/19    Psychiatric disorder       Anxiety, major depression, bipolar    Spinal stenosis      Stroke ([facility] [address] )       tia    Syncope 2014     orthostatic hypotension    Wears dentures       upper           Surgical History         Past Surgical History:   Procedure Laterality Date    APPENDECTOMY        CAST APPLICATION Left 4/6/3190     Procedure: Application short-arm splint;  Surgeon: Jose E Mahajan MD;  [facility];  Service: Orthopedics    COLONOSCOPY        GASTRIC RESTRICTION SURGERY         Gastric Sleeve Nov 2015    HYSTERECTOMY         DONALD    JOINT REPLACEMENT         Left Knee 2011 and Right Hip 2010    ORIF WRIST FRACTURE Left 7/4/2020     Procedure: Open reduction and internal fixation left radius and ulnar shaft fracture;  Surgeon: Jose E Mahajan MD;  [facility];  Service: Orthopedics    [place] RECONSTR TOTAL SHOULDER IMPLANT Left 3/30/2021     Procedure: ARTHROPLASTY SHOULDER - anatomic;  Surgeon: Dunia Luna MD;  [facility];  Service: Orthopedics    WISDOM TOOTH EXTRACTION               Meds/Allergies:             Prior to Admission medications    Medication Sig Start Date End Date Taking?  Authorizing Provider   acetaminophen (TYLENOL) 500 mg tablet Take 1 tablet (500 mg total) by mouth every 6 (six) hours as needed (pain) 7/30/19   Yes Michelle Rogers PA-C   apixaban (ELIQUIS) 5 mg Take 5 mg by mouth 2 (two) times a day     Yes Historical Provider, MD   ARIPiprazole (ABILIFY) 15 mg tablet Take 0 5 tablets (7 5 mg total) by mouth daily  Patient taking differently: Take 5 mg by mouth daily  10/21/19   Yes Thanh Davidson,    ferrous sulfate 325 (65 Fe) mg tablet Take 325 mg by mouth daily with breakfast     Yes Historical Provider, MD   fluvoxaMINE (LUVOX) 50 mg tablet Take 3 tablets (150 mg total) by mouth daily at bedtime  Patient taking differently: Take 300 mg by mouth daily at bedtime  10/20/19   Yes Thanh Davidson,    gabapentin (NEURONTIN) 300 mg capsule Take 1 capsule (300 mg total) by mouth 3 (three) times a day 7/9/20   Yes [place] SIRISHA Cruz   lamoTRIgine (LaMICtal) 200 MG tablet Take 200 mg by mouth daily      Yes Historical Provider, MD   LORazepam (ATIVAN) 1 mg tablet Take 2 tablets (2 mg total) by mouth 2 (two) times a day for 10 days  Patient taking differently: Take 2 mg by mouth 4 (four) times a day One in morning, one tab afternoon two tablet at bedtime 7/9/20   Yes [place] SIRISHA Cruz   meclizine (ANTIVERT) 12 5 MG tablet Take 1 tablet (12 5 mg total) by mouth every 8 (eight) hours as needed for dizziness for up to 7 days 7/1/20   Yes Hiram Nicole MD   ondansetron [facility]) 4 mg tablet Take 1 tablet (4 mg total) by mouth every 8 (eight) hours as needed for nausea or vomiting 10/7/19   Yes Reynold Gregg DO   oxyCODONE (ROXICODONE) 5 mg immediate release tablet 1 tablets every 6 hours as needed for severe pain only  3/30/21   Yes Tolu Ware PA-C   potassium chloride (K-DUR,KLOR-CON) 20 mEq tablet Take 40 mEq by mouth daily     Yes Historical Provider, MD   cholecalciferol (VITAMIN D3) 1,000 units tablet Take 5,000 Units by mouth daily       Historical Provider, MD   Erenumab-aooe (AIMOVIG) 140 MG/ML SOAJ Inject 140 mg under the skin every 30 (thirty) days  3/22/19     Historical Provider, MD      I have reviewed home medications with a medical source (PCP, Pharmacy, other)      Allergies: Allergies   Allergen Reactions    Ketorolac Itching and Other (See Comments)       [toradol] Itching, hives    Mushroom Extract Complex - Food Allergy Hives         Social History:     Marital Status: /Civil Union   Occupation: Unemployed  Patient Pre-hospital Living Situation: Home with      Patient Pre-hospital Level of Mobility: Cane/walker  Patient Pre-hospital Diet Restrictions: None  Substance Use History:   Social History           Substance and Sexual Activity   Alcohol Use Yes    Alcohol/week: 2 0 standard drinks    Types: 1 Glasses of wine, 1 Standard drinks or equivalent per week    Frequency: Monthly or less    Drinks per session: 1 or 2    Binge frequency: Less than monthly     Comment: occasionally      Social History           Tobacco Use   Smoking Status Former Smoker    Packs/day: 0 20    Years: 20 00    Pack years: 4 00    Types: Cigarettes    Start date: 0    Quit date:     Years since quittin 2   Smokeless Tobacco Never Used   Tobacco Comment     quit      Social History           Substance and Sexual Activity   Drug Use Never     Comment: na         Family History:     non-contributory     Physical Exam:      Vitals:   Blood Pressure: 125/66 (21 1640)  Pulse: 85 (21 1640)  Temperature: (!) 96 6 °F (35 9 °C) (21 1640)  Temp Source: Temporal (21 1640)  Respirations: 18 (21 1640)  Height: 5' 5" (165 1 cm) (21 1300)  Weight - Scale: 108 kg (238 lb 12 1 oz) (21 1300)  SpO2: 93 % (21 1640)     Physical Exam  Vitals signs reviewed  Constitutional:       General: She is not in acute distress  Appearance: She is not ill-appearing, toxic-appearing or diaphoretic  HENT:      Head: Normocephalic  Right Ear: External ear normal       Left Ear: External ear normal       Nose: Nose normal    Eyes:      General:         Right eye: No discharge  Left eye: No discharge  Neck:      Musculoskeletal: Neck supple  Cardiovascular:      Rate and Rhythm: Normal rate and regular rhythm  Heart sounds: Normal heart sounds  No murmur  Pulmonary:      Effort: Pulmonary effort is normal  No respiratory distress  Breath sounds: Normal breath sounds  No wheezing     Abdominal:      General: Bowel sounds are normal  There is no distension  Palpations: Abdomen is soft  Tenderness: There is no abdominal tenderness  There is no guarding  Musculoskeletal:         General: Deformity present  Comments: Left shoulder arthroplasty surgical incision with dressing and immobilizer   Skin:     General: Skin is dry  Neurological:      Mental Status: She is alert and oriented to person, place, and time  Psychiatric:         Behavior: Behavior normal                Additional Data:      Lab Results: I have personally reviewed pertinent reports             Results from last 7 days   Lab Units 04/06/21  0539   WBC Thousand/uL 11 40*   HEMOGLOBIN g/dL 10 9*   HEMATOCRIT % 35 2   PLATELETS Thousands/uL 408*   NEUTROS PCT % 66   LYMPHS PCT % 16   MONOS PCT % 12   EOS PCT % 4      Results from last 7 days   Lab Units 04/06/21  0539   SODIUM mmol/L 141   POTASSIUM mmol/L 4 1   CHLORIDE mmol/L 108   CO2 mmol/L 28   BUN mg/dL 13   CREATININE mg/dL 0 73   ANION GAP mmol/L 5   CALCIUM mg/dL 9 0   GLUCOSE RANDOM mg/dL 84                         Imaging: I have personally reviewed pertinent reports        No orders to display         EKG, Pathology, and Other Studies Reviewed on Admission:         Allscripts / Epic Records Reviewed: Yes      ** Please Note: This note has been constructed using a voice recognition system  **                           PT NOTES:        Standardized Assessments  [facility] 6-Click: 22/90     Home Evaluation (virtual) and Family Training  will address as appropriate          04/07/21 5875   PT Last Visit   PT Visit Date 04/07/21   Note Type   Note type Evaluation   Pain Assessment   Pain Assessment Tool 0-10   Pain Score 9   Pain Location/Orientation Orientation: Left; Location: [facility]   Hospital Pain Intervention(s) Medication (See MAR); Ambulation/increased activity;Repositioned   Home Living   Type of [address] [place] [address] Multi-level; Able to live on main level with bedroom/bathroom;Stairs to enter with rails  (3 BECCA Back L side, front R side rail)   Bathroom Shower/Tub Walk-in shower   Bathroom Toilet Raised   Bathroom Equipment Shower chair;Grab bars in shower   Bathroom Accessibility Accessible   Home Equipment Walker;Cane   Additional Comments ind with no AD   Prior Function   Level of [place] Independent with ADLs and functional mobility   Lives With Spouse   Receives Help From Family   ADL Assistance Independent   IADLs Independent   Falls in the last 6 months 1 to 4   Vocational Retired   Comments dtr will stay with pt for 2 weeks; ind with all ADLs at baseline   Restrictions/Precautions   Weight Bearing Precautions Per Order Yes   LUE Weight Bearing Per Order NWB   Braces or Orthoses Sling  (abd brace)   Other Precautions WBS;Pain   General   Family/Caregiver Present No   Cognition   Overall Cognitive Status WFL   Arousal/Participation Alert   Attention Within functional limits   Orientation Level Oriented X4   Memory Within functional limits   Following Commands Follows one step commands without difficulty   Strength RLE   RLE Overall Strength 4/5   Strength LLE   LLE Overall Strength 4/5   Coordination   Movements are Fluid and Coordinated 1   Sensation WFL   Light Touch   RLE Light Touch Grossly intact   LLE Light Touch Grossly intact   Bed Mobility   Supine to Sit 5  Supervision   Additional items Assist x 1;Bedrails; Increased time required;Verbal cues   Sit to Supine 5  Supervision   Additional items Assist x 1;Bedrails; Increased time required;Verbal cues   Transfers   Sit to Stand 5  Supervision   Additional items Assist x 1; Armrests; Increased time required;Verbal cues   Stand to Sit 5  Supervision   Additional items Assist x 1; Armrests; Increased time required;Verbal cues   Additional Comments no AD   Ambulation/Elevation   Gait pattern Short stride;Decreased foot clearance   Gait Assistance 5  Supervision   Additional items Verbal cues   Assistive Device None   Distance 100ft   Stair Management Assistance 5  Supervision   Additional items Assist x 1;Verbal cues; Increased time required   Stair Management Technique One rail L;Step to pattern; Foreward   Number of Stairs 2   Balance   Static Sitting Fair +   Dynamic Sitting Fair +   Static Standing Fair   Dynamic Standing Fair   Ambulatory Fair   Endurance Deficit   Endurance Deficit Yes   Activity Tolerance   Activity Tolerance Patient tolerated treatment well   Medical Staff Made Aware spoke to CM   Nurse Made Aware spoke to RN   Assessment   Prognosis Good   Problem List Decreased strength;Decreased endurance; Impaired balance;Decreased mobility; Decreased range of motion;Pain;Obesity;Orthopedic restrictions   Barriers to Discharge Inaccessible home environment   Goals   Patient Goals to get home by next wednesday   STG Expiration Date 04/14/21   Short Term Goal #1 see grid   PT Treatment Day 1   Plan   Treatment/Interventions Functional transfer training;LE strengthening/ROM; Elevations; Therapeutic exercise; Endurance training;Patient/family training;Equipment eval/education; Bed mobility;Gait training;Spoke to nursing;Spoke to case management;OT   PT Frequency    (5-7x/wk)   Recommendation   PT Discharge Recommendation Home with skilled therapy  (when rehab goals met)   AM-PAC Basic Mobility Inpatient   Turning in Bed Without Bedrails 4   Lying on Back to Sitting on Edge of Flat Bed 4   Moving Bed to Chair 4   Standing Up From Chair 4   Walk in Room 4   Climb 3-5 Stairs 3   Basic Mobility Inpatient Raw Score 23   Basic Mobility Standardized Score 50 88         Assessment                                                                                                                         Clarisa Tran is a 56 y o  female admitted to [facility] on 4/6/2021 for Aftercare following surgery of the musculoskeletal system, NEC   Pt  has a past medical history of Adrenal insufficiency (Andrés's disease) ([address] ), Bipolar disorder, C  difficile diarrhea, Cervical radiculopathy, Chronic back pain, Chronic pain disorder, DVT, lower extremity ([facility] [address] ) (), Fibromyalgia, History of TIAs, Hypertension, Hypokalemia, Migraine, MRSA (methicillin resistant Staphylococcus aureus) (2012), Psychiatric disorder, Spinal stenosis, Stroke [facility]), Syncope (), and Wears dentures    PT was consulted and pt was seen on 2021 for mobility assessment and d/c planning  Pt presents seated in recliner alert and agreeable to therapy  She is reporting pain in the L shoulder  Sensation is intact to BLE no numbness/tingling  Strength is WFL BLE  Reports she needs a R GIOVANNA revision however this does not cause her pain  Pt with fair balance with ambulation and transfers with no device  No SOB/BECKFORD with activity  Maintains LUE weight bearing well  Pt is currently functioning at a supervision assistance x1 level for bed mobility, supervision assistance x1 level for transfers, supervision assistance x1 level for ambulation with no assistive device, and supervision assistance x1 for elevations  Pt will benefit from continued skilled IP PT to address the above mentioned impairments  in order to maximize recovery and increase functional independence when completing mobility and ADLs  Currently PT recommendations for DME include none   At this time PT recommendations for d/c are home with skilled therapy when rehab goals met            PT GG Scores  Roll left and right:  4) Supervision or touching) verbal cues and/or touching, steadying, contact guard       Sit to [place]) Supervision or touching) verbal cues and/or touching, steadying, contact guard       Lying to Sit EOB:  4) Supervision or touching) verbal cues and/or touching, steadying, contact guard       Sit to Stand:  4) Supervision or touching) verbal cues and/or touching, steadying, contact guard       Chair/Bed to chair Transfer:  4) Supervision or touching) verbal cues and/or touching, steadying, contact guard       Toilet Transfer:  4) Supervision or touching) verbal cues and/or touching, steadying, contact guard       Walk 10ft 4) Supervision or touching) verbal cues and/or touching, steadying, contact guard       Walk 50ft with 2 turns 4) Supervision or touching) verbal cues and/or touching, steadying, contact guard       Walk 150ft 4) Supervision or touching) verbal cues and/or touching, steadying, contact guard       Wheel 50ft with 2 turns 9) Not Applicable) pt did not complete PTA      Wheel 325DQ 9) Not Applicable) pt did not complete PTA      Car transfer 4) Supervision or touching) verbal cues and/or touching, steadying, contact guard       Walk 10ft over uneven ground 4) Supervision or touching) verbal cues and/or touching, steadying, contact guard       1 step 4) Supervision or touching) verbal cues and/or touching, steadying, contact guard       4 steps 4) Supervision or touching) verbal cues and/or touching, steadying, contact guard       12 steps 88) Not attempted due to medical condition or safety concerns      Pick object from floor 4) Supervision or touching) verbal cues and/or touching, steadying, contact guard             Physical Therapy Treatment                                  (40 minutes)                                                          04/07/21 1350   Transfers   Sit to Stand 5  Supervision   Additional items Assist x 1; Armrests; Increased time required;Verbal cues   Stand to Sit 5  Supervision   Additional items Assist x 1; Armrests; Increased time required;Verbal cues   Toilet transfer 5  Supervision   Additional items Assist x 1; Armrests; Increased time required;Verbal cues;Standard toilet   Car transfer 5  Supervision   Additional items Assist x 1; Armrests; Increased time required;Verbal cues   Additional Comments no AD   Ambulation/Elevation   Gait pattern Short stride;Decreased foot clearance   Gait Assistance 5  Supervision   Additional items Verbal cues   Assistive Device None   Distance 100ft, 150ft, 50ft level tile; 30ft uneven surface   Stair Management Assistance 5  Supervision   Additional items Assist x 1;Verbal cues; Increased time required   Stair Management Technique One rail R;One rail L;Step to pattern; Foreward; Sideways  (tried different techniques for different steps )   Number of Stairs 6   Balance   Static Sitting Fair +   Dynamic Sitting Fair +   Static Standing Fair   Dynamic Standing [address]   Ambulatory Fair   Exercises   Balance training  Picking Item off Ground) supervision         Goals STG achieved within 1 weeks Performance at Initial Evaluation (4/8/2021) Last date completed      Bed mobility skills including rolling and repositioning to prevent skin breakdown and decrease caregiver burden            modified independent assistance       supervision assistance x1       4/7      Supine to sit transitions to increase ease of transfer, allow pt to get out of bed, and decrease caregiver burden          modified independent assistance       supervision assistance x1       4/7      Sit to supine transitions to increase ease of transfer, allow pt to get back into bed, and decrease caregiver burden          modified independent assistance       supervision assistance x1       4/7      Functional transfers to facilitate safe return to previous living environment        modified independent assistance    supervision assistance x1    4/7      Ambulation with least restrictive AD; including at least 2 turns for safe household distance ambulation          modified independent assistance       supervision assistance x1       4/7      Ascend/descend a full flight of steps with appropriate handrail, with device prn, for safe access to previous living environment     Pt has 2 entry ways one with RHR and one with LHR, practiced both on eval        modified independent assistance       supervision assistance x1       4/7      Improve standing functional balance to allow for pt to  object from floor            modified independent assistance       supervision assistance x1       4/7      Alec Jimenez PT, DPT              Dahiana Knapp PTA   Physical Therapist Assistant   Specialty:  Physical Therapy   Physical Therapy Note       Signed   Date of Service:  4/8/2021  1:27 PM               Signed                          28' session       04/08/21 1205   PT Last Visit   PT Visit Date 04/08/21   Note Type   Note Type Treatment   Pain Assessment   Pain Assessment Tool 0-10   Pain Score 9   Pain Location/Orientation Orientation: Left; Location: Shoulder   Pain Onset/Description Onset: Ongoing; Descriptor: Aching;Descriptor: Sore   Patient's Stated Pain Goal No pain   Hospital Pain Intervention(s) Medication (See MAR); Cold applied   Restrictions/Precautions   LUE Weight Bearing Per Order NWB   Braces or Orthoses Sling   Other Precautions WBS; Fall Risk;Pain   General   Chart Reviewed Yes   Family/Caregiver Present No   Cognition   Overall Cognitive Status WFL   Arousal/Participation Alert; Cooperative   Attention Within functional limits   Following Commands Follows all commands and directions without difficulty   Subjective   Subjective i am a little tired - I didn't sleep well last night   Bed Mobility   Supine to Sit 5  Supervision   Sit to Supine 5  Supervision   Additional Comments R side of the bed- bed flat no rail   Transfers   Sit to Stand 5  Supervision   Stand to Sit 5  Supervision   Stand pivot 5  Supervision   Car transfer    (simulated  S)   Ambulation/Elevation   Gait pattern    (waddling gt with poor heel strike)   Gait Assistance 5  Supervision   Assistive Device None   Distance 105' x 2   Stair Management Assistance 5  Supervision   Stair Management Technique One rail L;Step to pattern  (pt states she will use back steps that have and l rail up)   Number of Stairs 4   Balance   Static Standing Fair   Dynamic Standing Fair   Ambulatory Fair -   Endurance Deficit   Endurance Deficit Yes   Activity Tolerance   Activity Tolerance Patient tolerated treatment well   Exercises   Balance training  pt amb to BR to wash hands- stood at sink with S and no UE support   Assessment   Prognosis Good   Problem List Decreased strength;Decreased range of motion;Decreased endurance;Decreased mobility;Orthopedic restrictions;Pain   Assessment Pt states she feels a little unsteady from not sleeping well - no Lob with amb but + waddling gt with decrease heel strike which pt reports is her baseline  Pt reports that she will be using steps in then back to enter home so rail will be on the L ascending   Pt did well gettign in and OOB but reports that she will be sleeping in the recliner   Barriers to Discharge Inaccessible home environment;Decreased caregiver support   Goals   Patient Goals go home   STG Expiration Date 04/14/21   PT Treatment Day 2   Plan   Treatment/Interventions    (cont as per Royce [address])   Progress Progressing toward goals   PT Frequency    (5-7x/wk)      Goals STG achieved within 1 weeks Performance at Initial Evaluation (4/8/2021) Last date completed      Bed mobility skills including rolling and repositioning to prevent skin breakdown and decrease caregiver burden            modified independent assistance       supervision assistance x1       4/7      Supine to sit transitions to increase ease of transfer, allow pt to get out of bed, and decrease caregiver burden          modified independent assistance       supervision assistance x1       4/8      Sit to supine transitions to increase ease of transfer, allow pt to get back into bed, and decrease caregiver burden          modified independent assistance       supervision assistance x1       4/8      Functional transfers to facilitate safe return to previous living environment        modified independent assistance    supervision assistance x1    4/8      Ambulation with least restrictive AD; including at least 2 turns for safe household distance ambulation          modified independent assistance       supervision assistance x1       4/8      Ascend/descend a full flight of steps with appropriate handrail, with device prn, for safe access to previous living environment     Pt has 2 entry ways one with RHR and one with LHR, practiced both on eval        modified independent assistance       supervision assistance x1       4/8  Pt reports that she has 3 BECCA and will be staying on the first floor      Improve standing functional balance to allow for pt to  object from floor            modified independent assistance       supervision assistance x1       4/7                             OT NOTES:      Edmond Joy, OT   Occupational Therapist   Specialty:  Occupational Therapy   Occupational Therapy Note       Addendum   Date of Service:  4/7/2021 11:25 AM               Expand All Collapse All      Occupational Therapy Evaluation and Treatment      Patient Name: Jennie YAN Date: 4/7/2021  Problem List  Principal Problem:    Aftercare following surgery of the musculoskeletal system, NEC  Active Problems:    Bipolar depression ([address] )    Vertigo    Intractable migraine    Anxiety    Anemia    History of pulmonary embolism     Past Medical History  Medical History        Past Medical History:   Diagnosis Date    Adrenal insufficiency (Andrés's disease) ([address] )      Bipolar disorder      C  difficile diarrhea      Cervical radiculopathy      Chronic back pain      Chronic pain disorder       lumbar    DVT, lower extremity ([address] ) 1991    Fibromyalgia      History of TIAs       cannot remember details    Hypertension      Hypokalemia      Migraine      MRSA (methicillin resistant Staphylococcus aureus) 07/20/2012     nasal swab negative 4/5/19    Psychiatric disorder       Anxiety, major depression, bipolar    Spinal stenosis      Stroke ([address] )       tia    Syncope 2014     orthostatic hypotension    Wears dentures       upper        Past Surgical History  Surgical History         Past Surgical History:   Procedure Laterality Date    APPENDECTOMY        CAST APPLICATION Left 1/9/5106     Procedure: Application short-arm splint;  Surgeon: Steve Desouza MD;  [facility];  Service: Orthopedics    COLONOSCOPY        GASTRIC RESTRICTION SURGERY         Gastric Sleeve Nov 2015    HYSTERECTOMY         DONALD    JOINT REPLACEMENT         Left Knee 2011 and Right Hip 2010    ORIF WRIST FRACTURE Left 7/4/2020     Procedure: Open reduction and internal fixation left radius and ulnar shaft fracture;  Surgeon: Steve Desouza MD;  [facility];  Service: Orthopedics    LA RECONSTR TOTAL SHOULDER IMPLANT Left 3/30/2021     Procedure: ARTHROPLASTY SHOULDER - anatomic;  Surgeon: Ladonna Johnson MD;  [facility];  Service: Orthopedics    WISDOM TOOTH EXTRACTION               Time- [id number] 15 min eval 25 min txt   Vitals- stable   Standardized Assessment- see flowsheet   Home Evaluation (virtual)- will obtain photos as needed  Family/Caregiver Training- to initiate as needed     Additional Comments- Follow standard TSA protocol (Fracisco BRYANT)      Pt remains in room with all needs at end of session       Assessment:           04/07/21 1125   OT Last Visit   OT Visit Date 04/07/21   Note Type   Note type Evaluation/Treatment    Restrictions/Precautions   Weight Bearing Precautions Per Order Yes   LUE Weight Bearing Per Order NWB   Braces or Orthoses Sling  (abduction sling )   Other Precautions Pain;WBS   Pain Assessment   Pain Assessment Tool 0-10   Pain Score Worst Possible Pain   Pain Location/Orientation Orientation: Left; Location: Shoulder   Pain Onset/Description Onset: Ongoing   Effect of Pain on Daily Activities limits participation in ADLs   Home Living   Type of [address] [place] [address]; Able to live on main level with bedroom/bathroom;Stairs to enter with rails  (3 BECCA HR's; first floor setup with full bath )   Bathroom Shower/Tub Walk-in shower   Bathroom Toilet Raised   Bathroom Equipment Shower chair;Grab bars in shower   [address] Walker;Cane   Prior Function   Level of [place] Independent with ADLs and functional mobility   Lives With Spouse  (works nights; home by [employer] next day )   [address] [place] [address] in the last 6 months 1 to 4   Vocational Retired   Comments pt reports daughter to stay with her for 2 weeks upon d/c    Psychosocial   Psychosocial (WDL) WDL   Subjective   Subjective "I was independent before"    ADL   Eating Assistance 4  Minimal Assistance   Grooming Assistance 4  Minimal Assistance   UB Bathing Assistance 3  Moderate Assistance   LB Bathing Assistance 3  Moderate Assistance   UB Dressing Assistance 3  Moderate Assistance   LB Dressing Assistance 3  Moderate Assistance   Toileting Assistance  3  Moderate Assistance   Bed Mobility   Additional Comments Pt received OOB, returned to recliner at conclusion of session   Transfers   Sit to Stand 5  Supervision   Additional items Armrests; Increased time required   Stand to Sit 5  Supervision   Additional items Armrests; Increased time required   Stand pivot 5  Supervision   Additional items Armrests; Increased time required   Toilet transfer 5  Supervision   Additional items Armrests; Increased time required;Verbal cues;Raised toilet seat   Additional Comments no AD    Functional Mobility   Functional Mobility 5  Supervision   Additional Comments household distances within room; NO AD    Balance   Static Sitting Fair +   Dynamic Sitting Fair +   Static Standing Fair   Dynamic Standing Fair   Ambulatory Fair   Activity Tolerance   Activity Tolerance Patient limited by pain   Nurse Made Aware Spoke to RN    RUE Assessment   RUE Assessment WFL  (4/5 )   LUE Assessment   LUE Assessment X  (WFL elbow distally; intact cap refill)   Hand Function   Gross Motor Coordination Impaired   Fine Motor Coordination Functional   Sensation   Light Touch No apparent deficits   Additional Comments denies numbness/tingling    Proprioception   Proprioception No apparent deficits   Perception   Inattention/Neglect Appears intact   Cognition   Overall Cognitive Status WFL   Arousal/Participation Alert; Cooperative   Attention Within functional limits   Orientation Level Oriented X4   Memory Within functional limits   Following Commands Follows all commands and directions without difficulty   Assessment   Limitation Decreased ADL status; Decreased UE ROM; Decreased UE strength;Decreased endurance;Decreased self-care trans;Decreased high-level ADLs  (Ortho restrictions )   Prognosis Good   Assessment Pt is a 56 y o  female seen for OT evaluation s/p [facility] s/p L TSA with Long Head Biceps Tenodesis performed by Dr Cox Mainland 3/30/2021  NWB to LUE with abduction sling in place  To follow standard TSA protocol (contacted María for appropriate protocol, provided fax information)  OT completed an extensive review of pt's medical and social history  See PMH in pt chart  Personal factors affecting pt at time of IE include:steps to enter environment, limited home support, difficulty performing ADLS, difficulty performing IADLS  and environment  As per pt report, pta living with spouse in 2  with 3 BECCA, full bath on main floor with first floor setup  Spouse works full time, [id number], leaving patient home alone majority of the day  Independent with ADLs/IADLs and mobility with no AD  (+) drives  Sleeps in recliner vs standard bed (varies on comfort)  Upon evaluation, pt requires S with transfers and mobility with no AD  Adjusted sling, improper placement on arrival, educated pt on appropriate fit and placement of straps  UE strength WFL to RUE, elbow<>distally WFL on LUE  Intact cap refill and grasp  Severe pain to L shoulder, notified RN   Pt currently requires min A with feeding/grooming and Mod A with UB/LB ADL and toileting  2* the following deficits impacting occupational performance  Pt presents to OT below baseline due to the following performance deficits: weakness, decreased ROM, decreased strength, decreased balance, decreased tolerance, increased pain and orthopedic restrictions  Pt to benefit from continued skilled OT services while in the hospital to address deficits as defined above and maximize level of functional independence w ADL's and functional mobility  Occupational performance areas to address include: eating, grooming, bathing/shower, toilet hygiene, functional mobility, community mobility and clothing management  The patient's raw score on the AM-PAC Daily Activity inpatient short form is 16, standardized score is 35 96, less than 39 4  Based on OT evaluation, assessment(s), performance deficits listed, and current level of function, pt identified as a HIGH  complexity evaluation  From OT standpoint, recommendation at time of d/c would be home with skilled therapy and family support (OP OT/PT)  Goals   Patient Goals to be independent; to go home    Plan   Treatment Interventions ADL retraining;Functional transfer training;UE strengthening/ROM; Endurance training;Patient/family training;Equipment evaluation/education; Neuromuscular reeducation; Compensatory technique education; Energy conservation; Activityengagement   Goal Expiration Date 04/14/21   OT Treatment Day 1   OT Frequency Other (comment)  (5-7x/wk 1-2x/day  )   Additional Treatment Session   Start Time 1140   End Time 1205   Treatment Assessment Pt seen for Occupational Therapy treatment following evaluation with skilled intervention targeting functional transfer/mobility training with focus on proper body mechanics to facilitate safe ambulation in simulated home environment  Pt exhibits good insight to deficits and demonstrates good safety with all transfers and mobility trials   Toileted supervision, completes tasks in seated position (no CM)  Pt limited by pain, ROM, weakness, fatigue, endurance, activity tolerance , standing tolerance, static/dynamic standing balance  and orthopedic Restrictions/WBS  Pt educated on POC/purpose/benefits of OT/PT while on TCF, safe functional transfer techniques with appropriate body mechanics, fall prevention and safe discharge planning/ safety at home   Pt would benefit from continued OT sessions to further address above deficits to maximize independence      Additional Treatment Day 1   Recommendation   OT Discharge Recommendation Home with skilled therapy   AM-PAC Daily Activity Inpatient   Lower Body Dressing 2   Bathing 3   Toileting 3   Upper Body Dressing 2   Grooming 3   Eating 3   Daily Activity Raw Score 16   Daily Activity Standardized Score (Calc for Raw Score >=11) 35 96         Goals STG achieved within 1  Week  Performance at Initial Evaluation 4/7/2021  Current Performance (last date completed)   Feeding (cutting, opening containers, etc)  Devorah/I Min A     Grooming while standing at sink Devorah/I Min A (seated)      ADL transfers  Devorah/I Supervision      Bathroom mobility with appropriate AD Devorah/I Supervision      UB ADL  Devorah/I Mod A      LB ADL, AE PRN Devorah/I Mod A      Toileting/clothing management and hygiene Devorah/I Mod A      Dynamic standing balance for increased safety when completing purposeful tasks F+ F     Increase standing tolerance for inc'd safety with standing purposeful tasks >5 min  2 min      Participate in therex 3-5x/week for inc'd overall stamina/activity tolerance for purposeful tasks (per María protocol)  To be completed       shower stall transfer  Supervision        Item retrieval for inc'd independence and safety negotiating home environment Devorah/I       Bed mobility with HOB flat (pt reports possibly sleeping in recliner upon discharge; discharge goal when confirmed)  Devorah/I                Martínez Hernández, MS, OTR/L                      Subjective/Goals:  "I think it would be better without the pain"     Vitals: stable      Pain: 7/10 left UE reporting pain medication received at 7am     Treatment Time: (935-1043) 68 minutes + (1449-5775) 38 minutes= 106 minutes     Cognition: WFL     Precautions: NWB LUE (abduction sling), Pain      EVALUATION information:  · OT Goal expiration date: 4/14/21  · Treatment day: 2  · Discharge recommendation: home with skilled therapy   · HOME SET UP:  ? House- multi level on main level with bed/bath  ? Stairs to enter + rails  ? Walk In shower + shower chair and GB in shower  ? DME: walker, cane  ? Lives with spouse who works nights (home 3PM next day) + assist from family     Patient education: CARRY OVER OF WB STATUS, DEEP BREATHING TECHNIQUES T/O ACTIVITY, SLOWING OF PACE, ENERGY CONSERVATION TECHNIQUES FOR CARRY OVER UPON D/C, INCREASED FAMILY SUPPORT, CONTINUE PARTICIPATION IN SELF-CARE/MOBILITY WITH STAFF WHILE IN [facility] , OVERALL SAFETY AWARENESS, FALL PREVENTION, ENERGY CONSERVATION  and PAIN MANAGEMENT WITH FUNCTIONAL ACTIVITIES     Additional comments: Pt is a 60 y  o  female seen for OT evaluation s/p admit to [facility] 4/6/2021 s/p L TSA with Long Head Biceps Tenodesis performed by Dr Kenny Alfred 3/30/2021  NWB to LUE with abduction sling in place  To follow standard TSA protocol (contacted María for appropriate protocol, provided fax information)      Assessment/Additional session details:  Patient seen this date for OT with focus on goals as set by OTR  Patient agreeable to skilled OT session with focus on ADL's (bathing, dressing, toileting), Transfers (STS, SPT), Standing tolerance/balance, Strength/ROM/HEP, Activity tolerance, Education on safety, fall prevention and energy conservation techniques, Item retrieval/safe transport with DME ed, ADL AE training, Compensatory techniques for follow of WBS and Bathroom/kitchen/home mobility     Barriers to treatment include fatigue, pain, weight bearing, safety, home environment (management in/out or throughout home), social/family support, decreased strength/coordination, balance , activity tolerance and standing tolerance  Patient educated on safe functional transfers techniques, the appropriate use of AE/DME to improve functional performance, and activity modification techniques for energy conservation  Plans for d/c are home with home OT and family support  Patient is making gains toward OT goals with continued OT recommended at this time to max tolerance, safety and function for appropriate d/c planning  At end of session patient remains in room seated at recliner with all needs within reach      Patient got fully dressed today to allow for ed and recommendations on needs as she is to d/c home with  whom works over the road and therefore not available until later PM   Patient states that she hopes to be home sometime this weekend as she has a Dr appointment on Monday  Ed on level of assist and support she would presently be needing  She does state that daughter will be coming for 2 weeks sometime next week but no date confirmed  See goal grid for functional status  She does report having a reacher and shoe horn for home and used to have a dressing stick but unsure location  Dressing stick used for session along with sock horse    Ed on recommendations for clothing that would be best to wear along with slipper in the home        GG scores:  eating (Setup), oral hygiene (Setup), toileting (S), bathing (Min/Mod A), UB dressing (Min/Mod A), LB dressing (S), and don/doff footwear min/mod assist          Goals STG achieved within 1  Week  Performance at Initial Evaluation 4/7/2021  Current Performance (last date completed)   Feeding (cutting, opening containers, etc)  Devorah/I Min A 4/8 set up needed    Grooming while standing at sink Devorah/I Min A (seated)   4/8 set up   ADL transfers  Devorah/I  4/8 Supervision   4/8 MI Bathroom mobility with appropriate AD Devorah/I  4/8 Supervision   4/8 MI   UB ADL  Devorah/I Mod A   4/8 mod assist + ed except brace max assist    LB ADL, AE PRN Devorah/I Mod A   4/8 supervision + cues except socks mod assist and max shoes    Toileting/clothing management and hygiene Devorah/I Mod A   4/8 Supervision   Dynamic standing balance for increased safety when completing purposeful tasks F+  4/8 F  4/8 Fair+   Increase standing tolerance for inc'd safety with standing purposeful tasks >5 min  2 min   4/8 4 minutes   Participate in therex 3-5x/week for inc'd overall stamina/activity tolerance for purposeful tasks (per María protocol)  To be completed    4/8 good tolerance to week 1 protocol for reverse total shoulder    shower stall transfer  Supervision        Item retrieval for inc'd independence and safety negotiating home environment Devorah/I  4/8 4/8 MI   Bed mobility with [facility] flat (pt reports possibly sleeping in recliner upon discharge; discharge goal when confirmed)  Devorah/I    4/8 min assist       Pili Douglas            DISCHARGE PLANNING:           TERESA Diaz      Specialty:  Care Coordination   Case Management   Addendum   Date of Service:  4/8/2021 10:20 AM                    SW met with patient to review general assessment and admission paperwork  SW also wanted to discuss [facility] meeting timing  Patient presented as alert during conversation  Patient reported that she lives with  Jeni Trimble, a , in a 3 story home with 3 steps to enter  Patient reportedly independent PTA  Patient has a cane, RW, shower chiar, and BSC at home  Patient has history [facility]  Patient anticipated to need outpatient therapy at time of dc       For [facility] meeting, patient explains that  Jeni Trimble is a  and is not available via phone to call  She reports that daughter Jule Koyanagi who will be staying with her lives in [place] and works at [employer], and not always available  Ben attempted to call Ladi Beltran, but only daughter Rachel's number available  BEN called and spoke with Rachel  Rachel reports that Ladi Beltran plans on coming to stay with patient on May1st  She states that was the discussion as of last weekend with her sister  Rachel reports there might be changes, but she was told that May 1st was the day  Rachel reports that Esau Reynoso is usually done with his job between 2pm-3pm everyday  BEN called and left a voice message for Esau Reynoso  SW will discuss with patient       BEN met with patient again after her therapy session to discuss [facility] meeting and dc plan  BEN advised patient that her insurance is requesting an update from rehab unit  BEN informed patient that insurance might deny any extended stay  BEN also presented concern issued by Rachel that [facility] now wont be able to provide care until May 1st  Patient seemed somewhat surprised by information but will follow up with [facility] when she is done with work  Patient reported that she would be okay with insurance does not extend since she and  are trying to work out a plan for her to be home  BEN did inform patient that she called and left a message for patient inviting him to meeting  Patient remains unsure if  will be able to attend  BEN received consent to send invitation to Rachel while awaiting determination from insurance company  BEN will follow       BEN later received a return phone call from patient's   He stated that he would be available at 1:30pm to have [facility] meeting  He is also aware that patient is due for an insurance review tomorrow  He is aware that insurance may present a last cover date  BEN will follow

## 2021-04-10 VITALS
SYSTOLIC BLOOD PRESSURE: 108 MMHG | HEART RATE: 81 BPM | TEMPERATURE: 97.5 F | OXYGEN SATURATION: 94 % | RESPIRATION RATE: 18 BRPM | DIASTOLIC BLOOD PRESSURE: 54 MMHG | WEIGHT: 240.3 LBS | HEIGHT: 65 IN | BODY MASS INDEX: 40.04 KG/M2

## 2021-04-10 PROCEDURE — 97535 SELF CARE MNGMENT TRAINING: CPT

## 2021-04-10 PROCEDURE — 97116 GAIT TRAINING THERAPY: CPT

## 2021-04-10 PROCEDURE — 99315 NF DSCHRG MGMT 30 MIN/LESS: CPT | Performed by: INTERNAL MEDICINE

## 2021-04-10 PROCEDURE — 97530 THERAPEUTIC ACTIVITIES: CPT

## 2021-04-10 PROCEDURE — 97110 THERAPEUTIC EXERCISES: CPT

## 2021-04-10 RX ORDER — BISACODYL 10 MG
10 SUPPOSITORY, RECTAL RECTAL DAILY PRN
Qty: 12 SUPPOSITORY | Refills: 0 | Status: SHIPPED | OUTPATIENT
Start: 2021-04-10 | End: 2022-02-28 | Stop reason: ALTCHOICE

## 2021-04-10 RX ORDER — METHOCARBAMOL 500 MG/1
500 TABLET, FILM COATED ORAL EVERY 6 HOURS SCHEDULED
Qty: 28 TABLET | Refills: 0 | Status: SHIPPED | OUTPATIENT
Start: 2021-04-10 | End: 2022-01-29 | Stop reason: SDUPTHER

## 2021-04-10 RX ORDER — PANTOPRAZOLE SODIUM 40 MG/1
40 TABLET, DELAYED RELEASE ORAL
Qty: 30 TABLET | Refills: 0 | Status: SHIPPED | OUTPATIENT
Start: 2021-04-11 | End: 2022-04-28 | Stop reason: ALTCHOICE

## 2021-04-10 RX ORDER — LIDOCAINE 50 MG/G
1 PATCH TOPICAL DAILY
Qty: 30 PATCH | Refills: 0 | Status: SHIPPED | OUTPATIENT
Start: 2021-04-10 | End: 2022-02-28 | Stop reason: ALTCHOICE

## 2021-04-10 RX ORDER — OXYCODONE HYDROCHLORIDE 5 MG/1
TABLET ORAL
Qty: 8 TABLET | Refills: 0 | Status: SHIPPED | OUTPATIENT
Start: 2021-04-10 | End: 2021-04-12 | Stop reason: ALTCHOICE

## 2021-04-10 RX ORDER — AMOXICILLIN 250 MG
1 CAPSULE ORAL
Qty: 10 TABLET | Refills: 0 | Status: SHIPPED | OUTPATIENT
Start: 2021-04-10 | End: 2022-02-28 | Stop reason: ALTCHOICE

## 2021-04-10 RX ADMIN — LAMOTRIGINE 200 MG: 100 TABLET ORAL at 08:24

## 2021-04-10 RX ADMIN — LORAZEPAM 2 MG: 1 TABLET ORAL at 08:24

## 2021-04-10 RX ADMIN — PANTOPRAZOLE SODIUM 40 MG: 40 TABLET, DELAYED RELEASE ORAL at 05:17

## 2021-04-10 RX ADMIN — APIXABAN 5 MG: 5 TABLET, FILM COATED ORAL at 08:28

## 2021-04-10 RX ADMIN — NYSTATIN 500000 UNITS: 100000 SUSPENSION ORAL at 12:13

## 2021-04-10 RX ADMIN — METHOCARBAMOL 500 MG: 500 TABLET, FILM COATED ORAL at 12:13

## 2021-04-10 RX ADMIN — NYSTATIN 500000 UNITS: 100000 SUSPENSION ORAL at 08:24

## 2021-04-10 RX ADMIN — POTASSIUM CHLORIDE 40 MEQ: 20 TABLET, EXTENDED RELEASE ORAL at 08:24

## 2021-04-10 RX ADMIN — METHOCARBAMOL 500 MG: 500 TABLET, FILM COATED ORAL at 05:17

## 2021-04-10 RX ADMIN — Medication 2000 UNITS: at 08:24

## 2021-04-10 RX ADMIN — OXYCODONE HYDROCHLORIDE 5 MG: 5 TABLET ORAL at 12:17

## 2021-04-10 RX ADMIN — ARIPIPRAZOLE 5 MG: 5 TABLET ORAL at 08:24

## 2021-04-10 RX ADMIN — GABAPENTIN 300 MG: 300 CAPSULE ORAL at 08:24

## 2021-04-10 RX ADMIN — ACETAMINOPHEN 975 MG: 325 TABLET ORAL at 05:17

## 2021-04-10 RX ADMIN — OXYCODONE HYDROCHLORIDE 5 MG: 5 TABLET ORAL at 02:31

## 2021-04-10 RX ADMIN — ACETAMINOPHEN 975 MG: 325 TABLET ORAL at 13:58

## 2021-04-10 RX ADMIN — OXYCODONE HYDROCHLORIDE 5 MG: 5 TABLET ORAL at 06:34

## 2021-04-10 NOTE — OCCUPATIONAL THERAPY NOTE
Occupational Therapy Treatment note    ALFREDO:[id number]  VITALS: stable, no BECKFORD noted   PAIN:8/10 to left UE -RN notified   COGNITION: pleasant and cooperative, A0x4, Fair carryover of new learning with sling management and compensatory techniques requiring verbal cues for correct technique  PRECAUTIONS: NWB to LUE, shoulder abduction sling, pain    EXPIRATION DATE:4/14/21  TREATMENT DAY: 4  DISCHARGE RECOMMENDATION: home with skilled OT  DME NEEDED FOR DISCHARGE:  None pt has all DME at baseline  ASSESSMENT:    Patient participated in Skilled OT session this date with interventions consisting of self care tasks with dressing UB/LB at EOB and sling management, bed mobility, functional transfers/mobility without, AD   Patient agreeable to OT treatment session, upon arrival patient was found supine in bed  In comparison to previous session, patient with improvements in activity tolerance   Patient requiring occasional verbal cues for sling management/correct technique  Patient continues to be functioning below baseline level, occupational performance remains limited secondary to factors listed above and increased risk for falls and injury  From OT standpoint, recommendation at time of d/c would be Home OT     Patient to benefit from continued Occupational Therapy treatment while in the hospital to address deficits as defined above and maximize level of functional independence with ADLs and functional mobility             Goals STG achieved within 1  Week  Performance at Initial Evaluation 4/7/2021  Current Performance (last date completed)   Feeding (cutting, opening containers, etc)  Devorah/I Min A 4/9 set up needed    Grooming while standing at sink Devorah/I Min A (seated)   4/9 MI hand washing   ADL transfers  Devorah/I  4/8, 4/9 Supervision   4/9 MI   Bathroom mobility with appropriate AD Devorah/I  4/8, 4/9 Supervision  4/10 MI without AD increased time   4/9 MI   UB ADL  Devorah/I Mod A  4/10 min a to don button down shirt, mod to don sling and cues  4/9 doff sling MI, don mod assist + cues  Overhead gown min assist + v/c dressing  Min bathing UE   4/8 mod assist + ed except brace max assist    LB ADL, AE PRN Devorah/I Mod A  4/10 min a to don pants and hike left side over hips, mod a to don socks with one handed technique post education, max a to don sneakers/tie laces  4/9 + AE Supervision with cues socks and underwear (except CM- see toileting)   4/8 supervision + cues except socks mod assist and max shoes    Toileting/clothing management and hygiene Devorah/I  *4/9 Mod A  *4/9 with underwear management mod assist 2* sweating    Patient ed on management of loose fitting underwear-- later PM MI without underwear to manage and gown only    4/8 Supervision   Dynamic standing balance for increased safety when completing purposeful tasks F+  4/8, 4/9 F 410 F+ without Ad   4/9 Fair+   Increase standing tolerance for inc'd safety with standing purposeful tasks >5 min   4/9 2 min   4/9 5 minutes   Participate in therex 3-5x/week for inc'd overall stamina/activity tolerance for purposeful tasks (per María protocol)  To be completed    4/9 good tolerance to week 1 protocol for reverse total shoulder-- ice provided post session     shower stall transfer  Supervision4/9 4/9 simulated with activity supervision-- may not be able to shower upon d/c and instructed to talk with surgeon     Item retrieval for inc'd independence and safety negotiating home environment Devorah/I  4/8, 4/9 4/9  MI   Bed mobility with [facility] flat (pt reports possibly sleeping in recliner upon discharge; discharge goal when confirmed)  Devorah/I   4/10 Supine<>sit  4/9 MI + rail from right (states rail at home but plans use of recliner)   Haley Ware MOT, OTR/L

## 2021-04-10 NOTE — DISCHARGE SUMMARY
[address] [facility]  Discharge- Javier Carcamo 1960, 56 y o  female MRN: [id number]  Unit/Bed#: -01 Encounter: [id number]  Primary Care Provider: Sai Mckeon MD   Date and time admitted to hospital: 4/6/2021 12:52 PM    * Aftercare following surgery of the musculoskeletal system, NEC  Assessment & Plan  She has been having osteoarthritis of left shoulder status post total arthroplasty of left shoulder on 03/30/2021  Postoperatively noted to have anemia hemoglobin dropped from 13-10 currently  Otherwise no major complication  Physical therapy evaluated the patient and recommended rehab given decrease in drains and gait imbalance  Progressing well with PT/OT for strengthening and gait training  Scheduled Robaxin  Pain management  Supportive care  On Eliquis or history of DVT will continue    History of pulmonary embolism  Assessment & Plan  Resume pre-hospital Eliquis  Anemia  Assessment & Plan  Hemoglobin baseline 13 preoperatively current around 10 will continue to monitor weekly  Anxiety  Assessment & Plan  Resume pre-hospital Ativan  Intractable migraine  Assessment & Plan  Follow-up at [facility]  Continue lamotrigine  Vertigo  Assessment & Plan  Continue pre-hospital meclizine  Bipolar depression ([facility] [address] )  Assessment & Plan  Resume pre-hospital Abilify and Luvox  Discharging Physician / Practitioner: Emelyn Van MD  PCP: Sai Mckeon MD  Admission Date:   Admission Orders (From admission, onward)     Ordered        04/06/21 1406  Admit Patient to  Once                   Discharge Date: 04/10/21    Resolved Problems  Date Reviewed: 4/10/2021    None          Consultations During Hospital Stay:  · None    Procedures Performed:   · None    Significant Findings / Test Results:   · None    Incidental Findings:   · None     Test Results Pending at Discharge (will require follow up):    · None     Outpatient Tests Requested:  · None    Complications:  None    Reason for Admission:  Difficulty ambulation    Hospital Course:     Idalia Ha is a 56 y o  female patient who originally presented to the hospital on 4/6/2021 due to left shoulder pain secondary to left shoulder arthroplasty  She progressed well with PT and OT and will be discharged to complete outpatient therapy with VNA  She requested pain control while awaiting her appointment in the next 2 days with her orthopedic surgeon will prescribe oxycodone for 2 days supply pending her appointment with the surgeon  All questions and concerns addressed she was in agreement with discharge plan  Please see above list of diagnoses and related plan for additional information  Condition at Discharge: good     Discharge Day Visit / Exam:     Subjective:  Patient seen and examined bedside on 04/09/2021  She feel comfortable except left shoulder pain  Vitals: Blood Pressure: 101/58 (04/09/21 1519)  Pulse: 86 (04/09/21 1519)  Temperature: (!) 97 1 °F (36 2 °C) (04/09/21 1519)  Temp Source: Temporal (04/09/21 1519)  Respirations: 20 (04/09/21 1519)  Height: 5' 5" (165 1 cm) (04/06/21 1300)  Weight - Scale: 109 kg (240 lb 4 8 oz) (04/08/21 0553)  SpO2: 97 % (04/09/21 1519)  Exam:   Physical Exam  Vitals signs reviewed  Constitutional:       General: She is not in acute distress  Appearance: She is not ill-appearing  HENT:      Nose: No rhinorrhea  Eyes:      General:         Right eye: No discharge  Left eye: No discharge  Cardiovascular:      Rate and Rhythm: Normal rate and regular rhythm  Heart sounds: Normal heart sounds  No murmur  Pulmonary:      Effort: Pulmonary effort is normal  No respiratory distress  Breath sounds: Normal breath sounds  No wheezing  Abdominal:      General: Bowel sounds are normal  There is no distension ( obese abdomen)  Palpations: Abdomen is soft     Musculoskeletal:         General: Deformity (Left upper extremity with immobilizer able to move her left hand fingers) present  Neurological:      Mental Status: She is alert and oriented to person, place, and time  Psychiatric:         Behavior: Behavior normal          Discussion with Family:  Discussed with patient at bedside  Discharge instructions/Information to patient and family:   See after visit summary for information provided to patient and family  Provisions for Follow-Up Care:  See after visit summary for information related to follow-up care and any pertinent home health orders  Disposition:     Home with VNA Services (Reminder: Complete face to face encounter)    For Discharges to [facility]:   · Not Applicable to this Patient - Not Applicable to this Patient    Planned Readmission:  None     Discharge Statement:  I spent 15 minutes discharging the patient  This time was spent on the day of discharge  I had direct contact with the patient on the day of discharge  Greater than 50% of the total time was spent examining patient, answering all patient questions, arranging and discussing plan of care with patient as well as directly providing post-discharge instructions  Additional time then spent on discharge activities  Discharge Medications:  See after visit summary for reconciled discharge medications provided to patient and family        ** Please Note: This note has been constructed using a voice recognition system **

## 2021-04-10 NOTE — ASSESSMENT & PLAN NOTE
She has been having osteoarthritis of left shoulder status post total arthroplasty of left shoulder on 03/30/2021  Postoperatively noted to have anemia hemoglobin dropped from 13-10 currently  Otherwise no major complication  Physical therapy evaluated the patient and recommended rehab given decrease in drains and gait imbalance        Progressing well with PT/OT for strengthening and gait training  Scheduled Robaxin  Pain management  Supportive care  On Eliquis or history of DVT will continue

## 2021-04-10 NOTE — PLAN OF CARE
Problem: PHYSICAL THERAPY ADULT  Goal: Performs mobility at highest level of function for planned discharge setting  See evaluation for individualized goals  Description: Treatment/Interventions: Functional transfer training, LE strengthening/ROM, Elevations, Therapeutic exercise, Endurance training, Patient/family training, Equipment eval/education, Bed mobility, Gait training, Spoke to nursing, Spoke to case management, OT          See flowsheet documentation for full assessment, interventions and recommendations  Outcome: Progressing  Note: Prognosis: Good  Problem List: Decreased strength, Decreased range of motion, Decreased endurance, Decreased mobility, Orthopedic restrictions, Pain  Assessment: Pt seen for PT treatment session  Pt agreeable to PT  Pt is doing well w/ all transfers  Pts gait is stady  No difficulty on stairs w/ use of L rail  Pt had some difficulty w/ 1 curb step w/ SPC  Pt reports she uses ramps, and doesn't really need to perform a curb  Pt requested HEP  HEP isssued and reviewed w/ pt  Barriers to Discharge: Inaccessible home environment, Decreased caregiver support     PT Discharge Recommendation: Home with skilled therapy(when rehab goals met)          See flowsheet documentation for full assessment

## 2021-04-10 NOTE — PHYSICAL THERAPY NOTE
44 minute treatment       04/10/21 0915   PT Last Visit   PT Visit Date 04/10/21   Note Type   Note Type Treatment   Pain Assessment   Pain Assessment Tool 0-10   Pain Score 7   Pain Location/Orientation Orientation: Left; Location: Shoulder   Pain Onset/Description Onset: Ongoing;Frequency: Intermittent   Hospital Pain Intervention(s) Medication (See MAR); Repositioned; Ambulation/increased activity; Emotional support; Environmental changes   Restrictions/Precautions   Weight Bearing Precautions Per Order Yes   LUE Weight Bearing Per Order NWB   Braces or Orthoses Sling   Other Precautions Fall Risk;Pain;WBS   General   Chart Reviewed Yes   Response to Previous Treatment Patient with no complaints from previous session  Family/Caregiver Present No   Cognition   Overall Cognitive Status WFL   Following Commands Follows all commands and directions without difficulty   Subjective   Subjective I had a question about the car transfer but I can't remember it   Bed Mobility   Rolling R 6  Modified independent   Supine to Sit 6  Modified independent   Additional items   (bed flat no ril)   Transfers   Sit to Stand 6  Modified independent   Stand to Sit 6  Modified independent   Stand pivot 6  Modified independent   Toilet transfer 6  Modified independent   Additional items   (Pt I in hygiene)   Car transfer 6  Modified independent   Additional items   (simulated car transfer)   Ambulation/Elevation   Gait pattern Wide ZEESHAN; Decreased foot clearance; Improper Weight shift   Gait Assistance   (mod I for shorter distances; S for longer distances)   Assistive Device None   Distance 150', 100', 20'   Stair Management Assistance 6  Modified independent   Stair Management Technique One rail L;Sideways; Foreward   Number of Stairs 8  (4 steps x 2)   Curbs 1+1 w/ SPC and CG   Balance   Ambulatory Fair -   Endurance Deficit   Endurance Deficit Yes   Endurance Deficit Description fatigue   Activity Tolerance   Activity Tolerance Patient tolerated treatment well   Exercises   Hip Flexion Sitting;20 reps;AROM; Bilateral   Knee AROM Long Arc Quad Sitting;20 reps;AROM; Bilateral   Ankle Pumps Sitting;20 reps;AROM; Bilateral   Marching Standing;20 reps;AROM; Bilateral   Assessment   Prognosis Good   Problem List Decreased strength;Decreased range of motion;Decreased endurance;Decreased mobility;Orthopedic restrictions;Pain   Assessment Pt seen for PT treatment session  Pt agreeable to PT  Pt is doing well w/ all transfers  Pts gait is stady  No difficulty on stairs w/ use of L rail  Pt had some difficulty w/ 1 curb step w/ SPC  Pt reports she uses ramps, and doesn't really need to perform a curb  Pt requested HEP  HEP isssued and reviewed w/ pt     Barriers to Discharge Inaccessible home environment;Decreased caregiver support   Goals   Patient Goals go home today   STG Expiration Date 04/14/21   PT Treatment Day 4   Plan   Treatment/Interventions   (COntinue per plan of care)   Progress Progressing toward goals   PT Frequency   (5-7x/week)     Goals STG achieved within 1 weeks Performance at Initial Evaluation (4/8/2021) Last date completed      Bed mobility skills including rolling and repositioning to prevent skin breakdown and decrease caregiver burden            modified independent assistance       supervision assistance x1       4/10      Supine to sit transitions to increase ease of transfer, allow pt to get out of bed, and decrease caregiver burden          modified independent assistance       supervision assistance x1       4/10      Sit to supine transitions to increase ease of transfer, allow pt to get back into bed, and decrease caregiver burden          modified independent assistance       supervision assistance x1       4/8      Functional transfers to facilitate safe return to previous living environment        modified independent assistance    supervision assistance x1    4/10      Ambulation with least restrictive AD; including at least 2 turns for safe household distance ambulation          modified independent assistance       supervision assistance x1       4/10      Ascend/descend a full flight of steps with appropriate handrail, with device prn, for safe access to previous living environment     Pt has 2 entry ways one with RHR and one with LHR, practiced both on eval        modified independent assistance       supervision assistance x1    4/10   (4/8  Pt reports that she has 3 BECCA and will be staying on the first floor)      Improve standing functional balance to allow for pt to  object from floor            modified independent assistance       supervision assistance x1       4/10   Kandace Blanchard, PTA

## 2021-04-12 ENCOUNTER — APPOINTMENT (OUTPATIENT)
Dept: RADIOLOGY | Facility: OTHER | Age: 61
End: 2021-04-12
Payer: COMMERCIAL

## 2021-04-12 ENCOUNTER — OFFICE VISIT (OUTPATIENT)
Dept: OBGYN CLINIC | Facility: OTHER | Age: 61
End: 2021-04-12

## 2021-04-12 VITALS — DIASTOLIC BLOOD PRESSURE: 64 MMHG | SYSTOLIC BLOOD PRESSURE: 107 MMHG | HEART RATE: 106 BPM

## 2021-04-12 DIAGNOSIS — Z96.612 STATUS POST TOTAL REPLACEMENT OF LEFT SHOULDER: ICD-10-CM

## 2021-04-12 DIAGNOSIS — S52.231A CLOSED DISPLACED OBLIQUE FRACTURE OF SHAFT OF RIGHT ULNA, INITIAL ENCOUNTER: Primary | ICD-10-CM

## 2021-04-12 PROCEDURE — 73030 X-RAY EXAM OF SHOULDER: CPT

## 2021-04-12 PROCEDURE — 99024 POSTOP FOLLOW-UP VISIT: CPT | Performed by: PHYSICIAN ASSISTANT

## 2021-04-12 RX ORDER — ARIPIPRAZOLE 5 MG/1
TABLET ORAL
COMMUNITY
Start: 2021-03-16

## 2021-04-12 NOTE — PROGRESS NOTES
Assessment:       1  Closed displaced oblique fracture of shaft of right ulna, initial encounter    2  Status post total replacement of left shoulder          Plan:        Patient is doing well postoperatively  She was discharged from [facility] on Saturday  We strongly recommend outpatient PT for anatomic total shoulder arthroplasty rehab protocol  Operative note, images, and rehab protocol were discussed with patient and her   All questions were addressed/answered to the patient's satisfaction  As per our protocol, oxycodone will not be refilled at this time  She is to try OTC tylenol for pain  Follow-up is in 2 months to assess patient's progress  Subjective:     Patient ID: Billy Bhatt is a 56 y o  female  Chief Complaint:    HPI    Patient presents for follow-up status post left anatomic total shoulder arthroplasty  on 3/30/2021  Pain is controlled  Patient has no residual paresthesia following anesthetic block  Social History     Occupational History    Not on file   Tobacco Use    Smoking status: Former Smoker     Packs/day: 0 20     Years: 20 00     Pack years: 4 00     Types: Cigarettes     Start date:      Quit date:      Years since quittin 2    Smokeless tobacco: Never Used    Tobacco comment: quit   Substance and Sexual Activity    Alcohol use: Yes     Alcohol/week: 2 0 standard drinks     Types: 1 Glasses of wine, 1 Standard drinks or equivalent per week     Frequency: Monthly or less     Drinks per session: 1 or 2     Binge frequency: Less than monthly     Comment: occasionally    Drug use: Never     Comment: na    Sexual activity: Not Currently     Partners: Male      Review of Systems   Constitutional: Negative  Respiratory: Negative  Musculoskeletal: Positive for myalgias  Negative for arthralgias  Skin: Positive for wound  Neurological: Positive for weakness  Negative for numbness     Psychiatric/Behavioral: Negative for agitation, behavioral problems and confusion  Objective:     Ortho ExamPhysical Exam  HENT:      Head: Atraumatic  Cardiovascular:      Pulses: Normal pulses  Pulmonary:      Effort: Pulmonary effort is normal    Musculoskeletal:      Comments: Left Arm in sling  Range of motion and strength not tested  Skin:     General: Skin is warm and dry  Capillary Refill: Capillary refill takes less than 2 seconds  Comments: Incision dry and clean  Staples/sutures removed, steristrips applied  Neurological:      Mental Status: She is alert and oriented to person, place, and time  Sensory: No sensory deficit  Psychiatric:         Mood and Affect: Mood normal          Behavior: Behavior normal            I have personally reviewed pertinent films in PACS and my interpretation is consistent with anatomic total shoulder arthroplasty  Prosthesis in appropriate position  Glenohumeral joint preserved  Gerhardt Sep

## 2021-04-13 ENCOUNTER — TELEPHONE (OUTPATIENT)
Dept: OBGYN CLINIC | Facility: HOSPITAL | Age: 61
End: 2021-04-13

## 2021-04-13 NOTE — TELEPHONE ENCOUNTER
This can be normal   Keep covered and clean  If no improvement thru tomorrow we will see on Thursday as we are in OR tomorrow

## 2021-04-13 NOTE — TELEPHONE ENCOUNTER
Spoke to patient  She stated that she just noticed this bleeding this afternoon  Stated she has not been doing much today  She reports that it is not gushing blood  She does not have anything covering it right now  Advised her to keep it clean and covered with a dry bandage  If this continues through tomorrow or soaks through the bandage be sure to let us know  Patient verbalized understanding  Will call back if symptoms worsen

## 2021-04-13 NOTE — TELEPHONE ENCOUNTER
PO patient had staples removed yesterday and just noticed today that there is some bleeding where they were removed, Please return call  [phone]

## 2021-04-13 NOTE — PLAN OF CARE
Problem: PHYSICAL THERAPY ADULT  Goal: Performs mobility at highest level of function for planned discharge setting  See evaluation for individualized goals  Description: Treatment/Interventions: Functional transfer training, LE strengthening/ROM, Elevations, Therapeutic exercise, Endurance training, Patient/family training, Equipment eval/education, Bed mobility, Gait training, Spoke to nursing, Spoke to case management, OT          See flowsheet documentation for full assessment, interventions and recommendations  Outcome: Completed  Note: Prognosis: Good  Problem List: Decreased strength, Decreased range of motion, Decreased endurance, Decreased mobility, Orthopedic restrictions, Pain  Assessment: Pt seen for PT treatment session  Pt agreeable to PT  Pt is doing well w/ all transfers  Pts gait is stady  No difficulty on stairs w/ use of L rail  Pt had some difficulty w/ 1 curb step w/ SPC  Pt reports she uses ramps, and doesn't really need to perform a curb  Pt requested HEP  HEP isssued and reviewed w/ pt  Barriers to Discharge: Inaccessible home environment, Decreased caregiver support     PT Discharge Recommendation: Home with skilled therapy(when rehab goals met)          See flowsheet documentation for full assessment

## 2021-04-13 NOTE — PHYSICAL THERAPY NOTE
Physical Therapy Discharge Summary      Disposition: Patient will be discharged from skilled PT interventions effective 4/13/2021  Pt will be d/c home on 4/10/21 with home OT and PT services  DME: none    Discharge Summary: Pt participated in 4 PT sessions  Pt achieved ALL 7/7 PT goals  Pt presently functioning at mod I level for bed mobility, mod I level for transfers, mod I for ambulation no AD 150ft and mod I for stairs  Deficits remain in:  strength, balance, endurance, range of motion and pain  Pt and family deny concerns with d/c home  Pt will have VNA services, home OT/PT to ensure safety in home environment as well as functional progression  PT met with pt, pt denies questions/concerns for PT at this time  Recommendations: PT recommends pt return home with home PT  focusing on noted deficits in order to maximize safety and independence during functional activity; prevent falls and hospital readmission        GOALS:    Goals STG achieved within 1 weeks Performance at Initial Evaluation (4/8/2021) Last date completed      Bed mobility skills including rolling and repositioning to prevent skin breakdown and decrease caregiver burden            modified independent assistance  Completed       supervision assistance x1       4/10      Supine to sit transitions to increase ease of transfer, allow pt to get out of bed, and decrease caregiver burden          modified independent assistance  Completed       supervision assistance x1       4/10      Sit to supine transitions to increase ease of transfer, allow pt to get back into bed, and decrease caregiver burden          modified independent assistance  Completed       supervision assistance x1       4/8      Functional transfers to facilitate safe return to previous living environment        modified independent assistance  Completed      supervision assistance x1    4/10      Ambulation with least restrictive AD; including at least 2 turns for safe household distance ambulation          modified independent assistance  Completed       supervision assistance x1       4/10      Ascend/descend a full flight of steps with appropriate handrail, with device prn, for safe access to previous living environment     Pt has 2 entry ways one with RHR and one with LHR, practiced both on eval        modified independent assistance  Completed       supervision assistance x1    4/10   (4/8  Pt reports that she has 3 BECCA and will be staying on the first floor)      Improve standing functional balance to allow for pt to  object from floor            modified independent assistance  Completed       supervision assistance x1       4/10          Billy Newman PT, DPT

## 2021-04-14 NOTE — TELEPHONE ENCOUNTER
Two photos sent by visiting nurse to email pasted to message above  Visiting nurse, Kvng Salguero, state that the patient's dressing was saturated when she arrived but she changed the dressing and the shoulder was not actively bleeding at that time  She is asking for us to let the patient know if we still think an appointment tomorrow would be necessary

## 2021-04-14 NOTE — TELEPHONE ENCOUNTER
Spoke to patient  She reports that she had to change her bandage 2x last evening  Stated this is a slow trickle of blood but it is still bleeding  Reports no symptoms of excessive blood loss  Stated that she is on eliquis potentially leading to the prolonged minor bleeding  Advised patient to keep it covered and change soaked bandage as needed  Advised she should follow up tomorrow with Frederick Hull  Patient stated a couple of steri strips fell off when changing the bandage  Advised her to try to keep those on as much as possible  Asked for a photo of the incision  Patient stated she will get her  to take some later and call back for an email address to send them to at that time  Please be advised

## 2021-04-14 NOTE — TELEPHONE ENCOUNTER
If you already put her on my schedule  Nothing to advise  Keep clean and dry  Continued bleeding not necessarily abnormal given patient size, comorbidities and blood thinner

## 2021-04-14 NOTE — TELEPHONE ENCOUNTER
Spoke to patient  She called to let me know a Visiting nurse is coming to see her today, she will have the visiting nurse look at the incision as well

## 2021-04-14 NOTE — TELEPHONE ENCOUNTER
Patient is calling to speak with the triage nurse in reference to bleeding from the incision    Transferred to the triage nurse at [phone]

## 2021-04-14 NOTE — TELEPHONE ENCOUNTER
Patient calling back to request to speak to the nurse to provide an update about bleeding  Transferred to triage nurse

## 2021-04-14 NOTE — TELEPHONE ENCOUNTER
Spoke to patient  She stated she is not concerned  Stated the bleeding has stopped for now  She cancelled her appointment and will call us back if anything else comes up   Thanks

## 2021-04-15 ENCOUNTER — TELEPHONE (OUTPATIENT)
Dept: OBGYN CLINIC | Facility: HOSPITAL | Age: 61
End: 2021-04-15

## 2021-04-15 NOTE — TELEPHONE ENCOUNTER
Spoke to patient  She stated muscle relaxers did not help her  Patient reports that she has tried salonpas patches, ice and heat, tylneol and rest  None of the above help  Patient asked about lidocaine patches  Advised that she can use them if she has them  She stated she was unable to get them from the pharmacy on 4/10  Advised that sometimes insurance does not cover because 4% patches can be purchased OTC  Advised that she can try OTC patches as recommended  Encouraged her to mention to PT as well, they can offer some stretches and exercises that may help  She stated PT is coming to see her today  Patient denies chest pain, jaw pain, SOB or weakness  Advised that she should mention to the therapist when they come for assistance as this is typically treated with PT  Patient verbalized understanding

## 2021-04-15 NOTE — TELEPHONE ENCOUNTER
Patient sees Dr Ap Centeno  Patient is calling in stating that she had surgery on 3/30 to her left shoulder, she is stating that she is having severe cramping in her neck and it is going down into her left shoulder  The patient was given muscle relaxer's when in ED and took that but it is not working for her, she is asking what further she should do for this? She is asking for a call back relating this          Call back# [phone]

## 2021-04-15 NOTE — TELEPHONE ENCOUNTER
Therapy  It is functional and positional as she is in the sling  Therapy will help her start weaning out of the sling soon and when she does this, her symptoms should start to improve  Heat has been shown to help with the discomfort        We don't recommend muscle relaxers

## 2021-04-16 NOTE — TELEPHONE ENCOUNTER
Spoke to patient today  She stated the pain is still there  Therapy helped with exercises and she has used biofreeze which helps for a short while  She stated she comes out of the sling in a little over a week so she hopes that will help, too  Patient has been advised to call if anything else develops or any other concerns arise

## 2021-04-19 NOTE — TELEPHONE ENCOUNTER
Spoke to patient  She stated she was in horrible neck pain all weekend so her  told her to just take off the sling  She stated she removed the sling and has no more neck pain at all  She wants to know if she can just keep it off since she is supposed to take it off the 21st anyway  Please advise

## 2021-04-19 NOTE — TELEPHONE ENCOUNTER
Spoke to patient  She stated that she understands all of that from PT  Stated she is comfortable with that and will call with any questions or concerns

## 2021-04-19 NOTE — TELEPHONE ENCOUNTER
Yes   Just avoid positions of dislocation which therapy should have reviewed with her    By removing sling, she should not use the arm to push out of chairs, bed, etc

## 2021-05-19 ENCOUNTER — DOCUMENTATION (OUTPATIENT)
Dept: CASE MANAGEMENT | Facility: OTHER | Age: 61
End: 2021-05-19

## 2021-05-19 NOTE — MEDICAL HIGH UTILIZER
[facility]: [id number]       : 1960     Age: 56       Sex:  F Delilah Pryor   Utilization Background: She is a female with a history of migraine, Andréss disease, Bipolar, Depression, Fibromyalgia, and HTN who presents to [facility] with migraine  She has 14 ER visits, 9 admission, and 2 transfers to [facility] for Ketamine Infusions in 2019  Discussed with Neurology reveals that patient does not feel much better even after prolonged hospitalization         Treatment Recommendations:  ED Recommendations: Recommendations as per neurology: Give migraine protocol: using steroid protocol d/t toradol allergy  Recommend calling her [address] Neurologist to schedule close outpatient follow up  It is noted that the patient will provide other complaints to the ER providers to get admitted and then will complain of migraine in the hospital    Would not offer transfer for Ketamine Infusion to this patient as it has not worked in the past  This should be left up to Neurology to determine if this is appropriate         Inpatient Recommendations: Early consultation with neurology  Avoid narcotics/sedating agents due to over sedation in the past         Outpatient recommendations: Needs close follow up with [facility] Neurology  Needs CM to make sure that patient does not run out of her meds as she has in the past     Situation: Patient is a patient who presents frequently for migraines  It seems that she has started using other chief complaints to get admitted to the hospital for migraine treatment   As per neurology colleague her headache symptomatology does not usually change with treatment        PMH/PSH:  Migraine, Depression, Bipolar, Fibromyalgia, HTN, Hx of DVT      Assessment                              Drivers of repeated utilization:                 Symptomatology      Community Resources in place:    None -[facility] has mentioned that she does not have a  for infusions  May need assistance with transportation and with medications   Patient Care Team:   Mirna Montes DO - PCP [facility])  Nba Paul MD - Neurology [facility])  OP Care Manager: Estelle Riley RN                    Care plan date and owner: Roxie Diop PA-C 10/31/2019         Reviewed with patient before discharge?

## 2021-06-14 ENCOUNTER — OFFICE VISIT (OUTPATIENT)
Dept: OBGYN CLINIC | Facility: OTHER | Age: 61
End: 2021-06-14

## 2021-06-14 VITALS
DIASTOLIC BLOOD PRESSURE: 75 MMHG | HEART RATE: 96 BPM | BODY MASS INDEX: 39.99 KG/M2 | SYSTOLIC BLOOD PRESSURE: 136 MMHG | WEIGHT: 240 LBS | HEIGHT: 65 IN

## 2021-06-14 DIAGNOSIS — Z96.612 AFTERCARE FOLLOWING LEFT SHOULDER JOINT REPLACEMENT SURGERY: Primary | ICD-10-CM

## 2021-06-14 DIAGNOSIS — Z47.1 AFTERCARE FOLLOWING LEFT SHOULDER JOINT REPLACEMENT SURGERY: Primary | ICD-10-CM

## 2021-06-14 PROCEDURE — 99024 POSTOP FOLLOW-UP VISIT: CPT | Performed by: PHYSICIAN ASSISTANT

## 2021-06-14 NOTE — PROGRESS NOTES
Assessment:       1  Aftercare following left shoulder joint replacement surgery          Plan:        Patient is doing well postoperatively and improving  Operative note, images, and rehab protocol were reviewed  All questions were addressed to the patient's satisfaction  Patient should continue PT  Follow-up will be in 2 months to assess patient's progress  Subjective:     Patient ID: Dakota Valenzuela is a 56 y o  female  Chief Complaint:    HPI    Patient presents to the office for follow-up status post left anatomic total shoulder arthroplasty on 2021  She states she is improving; however, she continues to complain of weakness and muscle pain  She has had in-home therapy to date but will be transitioning to outpatient therapy soon  Social History     Occupational History    Not on file   Tobacco Use    Smoking status: Former Smoker     Packs/day: 0 20     Years: 20 00     Pack years: 4 00     Types: Cigarettes     Start date:      Quit date:      Years since quittin 4    Smokeless tobacco: Never Used    Tobacco comment: quit   Vaping Use    Vaping Use: Never used   Substance and Sexual Activity    Alcohol use: Yes     Alcohol/week: 2 0 standard drinks     Types: 1 Glasses of wine, 1 Standard drinks or equivalent per week     Comment: occasionally    Drug use: Never     Comment: na    Sexual activity: Not Currently     Partners: Male      Review of Systems   Constitutional: Negative  Respiratory: Negative  Musculoskeletal: Positive for myalgias  Negative for arthralgias  Skin: Negative for wound  Neurological: Positive for weakness  Negative for numbness  Psychiatric/Behavioral: Negative  Objective:     Ortho ExamPhysical Exam  HENT:      Head: Atraumatic  Cardiovascular:      Pulses: Normal pulses  Pulmonary:      Effort: Pulmonary effort is normal    Musculoskeletal:      Comments: Active range of motion left shoulder:   Forward flexion 90°, external rotation 60°  Passive range of motion: Forward flexion 120°  Skin:     General: Skin is warm and dry  Capillary Refill: Capillary refill takes less than 2 seconds  Comments: Surgical incision dry and clean, healed  Neurological:      Mental Status: She is alert and oriented to person, place, and time  Sensory: No sensory deficit     Psychiatric:         Mood and Affect: Mood normal          Behavior: Behavior normal

## 2021-06-20 ENCOUNTER — TELEPHONE (OUTPATIENT)
Dept: OTHER | Facility: OTHER | Age: 61
End: 2021-06-20

## 2021-06-20 NOTE — TELEPHONE ENCOUNTER
A second tiger connect was sent to on call provider, Dr Figueroa Shah, with patient's post-op pain symptoms  Call back number given to provider

## 2021-06-20 NOTE — TELEPHONE ENCOUNTER
patient had surgery in March on her elbow she is suddenly having severe pain and feels like pulling from the incision site  she never felt like this before

## 2021-07-01 ENCOUNTER — APPOINTMENT (EMERGENCY)
Dept: CT IMAGING | Facility: HOSPITAL | Age: 61
DRG: 641 | End: 2021-07-01
Payer: COMMERCIAL

## 2021-07-01 ENCOUNTER — APPOINTMENT (EMERGENCY)
Dept: RADIOLOGY | Facility: HOSPITAL | Age: 61
DRG: 641 | End: 2021-07-01
Payer: COMMERCIAL

## 2021-07-01 ENCOUNTER — APPOINTMENT (INPATIENT)
Dept: RADIOLOGY | Facility: HOSPITAL | Age: 61
DRG: 641 | End: 2021-07-01
Payer: COMMERCIAL

## 2021-07-01 ENCOUNTER — HOSPITAL ENCOUNTER (INPATIENT)
Facility: HOSPITAL | Age: 61
LOS: 5 days | Discharge: HOME HEALTH | DRG: 641 | End: 2021-07-06
Attending: EMERGENCY MEDICINE | Admitting: INTERNAL MEDICINE
Payer: COMMERCIAL

## 2021-07-01 ENCOUNTER — TELEPHONE (OUTPATIENT)
Dept: OBGYN CLINIC | Facility: HOSPITAL | Age: 61
End: 2021-07-01
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
## 2021-07-01 DIAGNOSIS — R42 LIGHTHEADEDNESS: ICD-10-CM

## 2021-07-01 DIAGNOSIS — R07.9 CHEST PAIN: ICD-10-CM

## 2021-07-01 DIAGNOSIS — M25.512 ACUTE PAIN OF LEFT SHOULDER: ICD-10-CM

## 2021-07-01 DIAGNOSIS — E87.6 HYPOKALEMIA: ICD-10-CM

## 2021-07-01 DIAGNOSIS — F31.9 BIPOLAR DEPRESSION (HCC): ICD-10-CM

## 2021-07-01 LAB
ALBUMIN SERPL BCP-MCNC: 2.7 G/DL (ref 3.5–5)
ALP SERPL-CCNC: 147 U/L (ref 46–116)
ALT SERPL W P-5'-P-CCNC: 19 U/L (ref 12–78)
ANION GAP SERPL CALCULATED.3IONS-SCNC: 2 MMOL/L (ref 4–13)
APTT PPP: 34 SECONDS (ref 23–37)
AST SERPL W P-5'-P-CCNC: 23 U/L (ref 5–45)
ATRIAL RATE: 77 BPM
ATRIAL RATE: 78 BPM
BACTERIA UR QL AUTO: ABNORMAL /HPF
BASOPHILS # BLD AUTO: 0.04 THOUSANDS/ΜL (ref 0–0.1)
BASOPHILS NFR BLD AUTO: 0 % (ref 0–1)
BILIRUB SERPL-MCNC: 0.2 MG/DL (ref 0.2–1)
BILIRUB UR QL STRIP: NEGATIVE
BUN SERPL-MCNC: 12 MG/DL (ref 5–25)
CALCIUM ALBUM COR SERPL-MCNC: 9.8 MG/DL (ref 8.3–10.1)
CALCIUM SERPL-MCNC: 8.8 MG/DL (ref 8.3–10.1)
CHLORIDE SERPL-SCNC: 100 MMOL/L (ref 100–108)
CLARITY UR: CLEAR
CO2 SERPL-SCNC: 42 MMOL/L (ref 21–32)
COLOR UR: YELLOW
CREAT SERPL-MCNC: 0.91 MG/DL (ref 0.6–1.3)
EOSINOPHIL # BLD AUTO: 0.28 THOUSAND/ΜL (ref 0–0.61)
EOSINOPHIL NFR BLD AUTO: 3 % (ref 0–6)
ERYTHROCYTE [DISTWIDTH] IN BLOOD BY AUTOMATED COUNT: 14.7 % (ref 11.6–15.1)
GFR SERPL CREATININE-BSD FRML MDRD: 68 ML/MIN/1.73SQ M
GLUCOSE SERPL-MCNC: 89 MG/DL (ref 65–140)
GLUCOSE UR STRIP-MCNC: NEGATIVE MG/DL
HCT VFR BLD AUTO: 35.4 % (ref 34.8–46.1)
HGB BLD-MCNC: 11.5 G/DL (ref 11.5–15.4)
HGB UR QL STRIP.AUTO: NEGATIVE
IMM GRANULOCYTES # BLD AUTO: 0.04 THOUSAND/UL (ref 0–0.2)
IMM GRANULOCYTES NFR BLD AUTO: 0 % (ref 0–2)
INR PPP: 1.22 (ref 0.84–1.19)
KETONES UR STRIP-MCNC: NEGATIVE MG/DL
LEUKOCYTE ESTERASE UR QL STRIP: ABNORMAL
LIPASE SERPL-CCNC: 45 U/L (ref 73–393)
LYMPHOCYTES # BLD AUTO: 2.1 THOUSANDS/ΜL (ref 0.6–4.47)
LYMPHOCYTES NFR BLD AUTO: 23 % (ref 14–44)
MAGNESIUM SERPL-MCNC: 2.1 MG/DL (ref 1.6–2.6)
MCH RBC QN AUTO: 29.7 PG (ref 26.8–34.3)
MCHC RBC AUTO-ENTMCNC: 32.5 G/DL (ref 31.4–37.4)
MCV RBC AUTO: 92 FL (ref 82–98)
MONOCYTES # BLD AUTO: 0.97 THOUSAND/ΜL (ref 0.17–1.22)
MONOCYTES NFR BLD AUTO: 10 % (ref 4–12)
NEUTROPHILS # BLD AUTO: 5.91 THOUSANDS/ΜL (ref 1.85–7.62)
NEUTS SEG NFR BLD AUTO: 64 % (ref 43–75)
NITRITE UR QL STRIP: NEGATIVE
NON-SQ EPI CELLS URNS QL MICRO: ABNORMAL /HPF
NRBC BLD AUTO-RTO: 0 /100 WBCS
P AXIS: 27 DEGREES
P AXIS: 37 DEGREES
PH UR STRIP.AUTO: 7 [PH] (ref 4.5–8)
PHOSPHATE SERPL-MCNC: 3 MG/DL (ref 2.3–4.1)
PLATELET # BLD AUTO: 420 THOUSANDS/UL (ref 149–390)
PMV BLD AUTO: 9.2 FL (ref 8.9–12.7)
POTASSIUM SERPL-SCNC: 1.8 MMOL/L (ref 3.5–5.3)
POTASSIUM UR-SCNC: 2.1 MMOL/L
PR INTERVAL: 160 MS
PR INTERVAL: 172 MS
PROT SERPL-MCNC: 6.6 G/DL (ref 6.4–8.2)
PROT UR STRIP-MCNC: NEGATIVE MG/DL
PROTHROMBIN TIME: 15.1 SECONDS (ref 11.6–14.5)
QRS AXIS: -12 DEGREES
QRS AXIS: -9 DEGREES
QRSD INTERVAL: 96 MS
QRSD INTERVAL: 98 MS
QT INTERVAL: 396 MS
QT INTERVAL: 412 MS
QTC INTERVAL: 451 MS
QTC INTERVAL: 466 MS
RBC # BLD AUTO: 3.87 MILLION/UL (ref 3.81–5.12)
RBC #/AREA URNS AUTO: ABNORMAL /HPF
SODIUM SERPL-SCNC: 144 MMOL/L (ref 136–145)
SP GR UR STRIP.AUTO: 1.01 (ref 1–1.03)
T WAVE AXIS: -23 DEGREES
T WAVE AXIS: -23 DEGREES
TROPONIN I SERPL-MCNC: <0.02 NG/ML
TROPONIN I SERPL-MCNC: <0.02 NG/ML
TSH SERPL DL<=0.05 MIU/L-ACNC: 1 UIU/ML (ref 0.36–3.74)
UROBILINOGEN UR QL STRIP.AUTO: 0.2 E.U./DL
VENTRICULAR RATE: 77 BPM
VENTRICULAR RATE: 78 BPM
WBC # BLD AUTO: 9.34 THOUSAND/UL (ref 4.31–10.16)
WBC #/AREA URNS AUTO: ABNORMAL /HPF

## 2021-07-01 PROCEDURE — 84132 ASSAY OF SERUM POTASSIUM: CPT | Performed by: PHYSICIAN ASSISTANT

## 2021-07-01 PROCEDURE — 99223 1ST HOSP IP/OBS HIGH 75: CPT | Performed by: INTERNAL MEDICINE

## 2021-07-01 PROCEDURE — 93005 ELECTROCARDIOGRAM TRACING: CPT

## 2021-07-01 PROCEDURE — 85025 COMPLETE CBC W/AUTO DIFF WBC: CPT | Performed by: PHYSICIAN ASSISTANT

## 2021-07-01 PROCEDURE — 84443 ASSAY THYROID STIM HORMONE: CPT | Performed by: PHYSICIAN ASSISTANT

## 2021-07-01 PROCEDURE — 71046 X-RAY EXAM CHEST 2 VIEWS: CPT

## 2021-07-01 PROCEDURE — 84133 ASSAY OF URINE POTASSIUM: CPT | Performed by: PHYSICIAN ASSISTANT

## 2021-07-01 PROCEDURE — 96365 THER/PROPH/DIAG IV INF INIT: CPT

## 2021-07-01 PROCEDURE — 83735 ASSAY OF MAGNESIUM: CPT | Performed by: PHYSICIAN ASSISTANT

## 2021-07-01 PROCEDURE — 84100 ASSAY OF PHOSPHORUS: CPT | Performed by: PHYSICIAN ASSISTANT

## 2021-07-01 PROCEDURE — 81001 URINALYSIS AUTO W/SCOPE: CPT

## 2021-07-01 PROCEDURE — 84484 ASSAY OF TROPONIN QUANT: CPT | Performed by: PHYSICIAN ASSISTANT

## 2021-07-01 PROCEDURE — 70450 CT HEAD/BRAIN W/O DYE: CPT

## 2021-07-01 PROCEDURE — 73030 X-RAY EXAM OF SHOULDER: CPT

## 2021-07-01 PROCEDURE — 72125 CT NECK SPINE W/O DYE: CPT

## 2021-07-01 PROCEDURE — 36415 COLL VENOUS BLD VENIPUNCTURE: CPT | Performed by: PHYSICIAN ASSISTANT

## 2021-07-01 PROCEDURE — 93010 ELECTROCARDIOGRAM REPORT: CPT | Performed by: INTERNAL MEDICINE

## 2021-07-01 PROCEDURE — 99285 EMERGENCY DEPT VISIT HI MDM: CPT

## 2021-07-01 PROCEDURE — 80053 COMPREHEN METABOLIC PANEL: CPT | Performed by: PHYSICIAN ASSISTANT

## 2021-07-01 PROCEDURE — 73130 X-RAY EXAM OF HAND: CPT

## 2021-07-01 PROCEDURE — 96361 HYDRATE IV INFUSION ADD-ON: CPT

## 2021-07-01 PROCEDURE — 96375 TX/PRO/DX INJ NEW DRUG ADDON: CPT

## 2021-07-01 PROCEDURE — 85730 THROMBOPLASTIN TIME PARTIAL: CPT | Performed by: PHYSICIAN ASSISTANT

## 2021-07-01 PROCEDURE — 99285 EMERGENCY DEPT VISIT HI MDM: CPT | Performed by: PHYSICIAN ASSISTANT

## 2021-07-01 PROCEDURE — 83690 ASSAY OF LIPASE: CPT | Performed by: PHYSICIAN ASSISTANT

## 2021-07-01 PROCEDURE — 85610 PROTHROMBIN TIME: CPT | Performed by: PHYSICIAN ASSISTANT

## 2021-07-01 RX ORDER — LIDOCAINE 50 MG/G
1 PATCH TOPICAL DAILY
Status: DISCONTINUED | OUTPATIENT
Start: 2021-07-02 | End: 2021-07-01

## 2021-07-01 RX ORDER — POTASSIUM CHLORIDE 14.9 MG/ML
20 INJECTION INTRAVENOUS
Status: DISPENSED | OUTPATIENT
Start: 2021-07-02 | End: 2021-07-02

## 2021-07-01 RX ORDER — PANTOPRAZOLE SODIUM 40 MG/1
40 TABLET, DELAYED RELEASE ORAL
Status: DISCONTINUED | OUTPATIENT
Start: 2021-07-02 | End: 2021-07-06 | Stop reason: HOSPADM

## 2021-07-01 RX ORDER — ACETAMINOPHEN 325 MG/1
650 TABLET ORAL EVERY 6 HOURS PRN
Status: DISCONTINUED | OUTPATIENT
Start: 2021-07-01 | End: 2021-07-06 | Stop reason: HOSPADM

## 2021-07-01 RX ORDER — SODIUM CHLORIDE 9 MG/ML
125 INJECTION, SOLUTION INTRAVENOUS CONTINUOUS
Status: DISPENSED | OUTPATIENT
Start: 2021-07-01 | End: 2021-07-01

## 2021-07-01 RX ORDER — GABAPENTIN 300 MG/1
300 CAPSULE ORAL 3 TIMES DAILY
Status: DISCONTINUED | OUTPATIENT
Start: 2021-07-01 | End: 2021-07-06 | Stop reason: HOSPADM

## 2021-07-01 RX ORDER — AMLODIPINE BESYLATE 5 MG/1
5 TABLET ORAL DAILY
Status: DISCONTINUED | OUTPATIENT
Start: 2021-07-02 | End: 2021-07-01

## 2021-07-01 RX ORDER — FLUVOXAMINE MALEATE 50 MG/1
300 TABLET, COATED ORAL
Status: DISCONTINUED | OUTPATIENT
Start: 2021-07-01 | End: 2021-07-06 | Stop reason: HOSPADM

## 2021-07-01 RX ORDER — DOCUSATE SODIUM 100 MG/1
100 CAPSULE, LIQUID FILLED ORAL 2 TIMES DAILY
Status: DISCONTINUED | OUTPATIENT
Start: 2021-07-01 | End: 2021-07-06 | Stop reason: HOSPADM

## 2021-07-01 RX ORDER — LAMOTRIGINE 100 MG/1
200 TABLET ORAL DAILY
Status: DISCONTINUED | OUTPATIENT
Start: 2021-07-02 | End: 2021-07-06 | Stop reason: HOSPADM

## 2021-07-01 RX ORDER — MECLIZINE HCL 12.5 MG/1
12.5 TABLET ORAL EVERY 8 HOURS PRN
Status: DISCONTINUED | OUTPATIENT
Start: 2021-07-01 | End: 2021-07-06 | Stop reason: HOSPADM

## 2021-07-01 RX ORDER — AMLODIPINE BESYLATE 5 MG/1
5 TABLET ORAL DAILY
Status: DISCONTINUED | OUTPATIENT
Start: 2021-07-01 | End: 2021-07-01

## 2021-07-01 RX ORDER — LIDOCAINE 50 MG/G
1 PATCH TOPICAL
Status: DISCONTINUED | OUTPATIENT
Start: 2021-07-02 | End: 2021-07-06 | Stop reason: HOSPADM

## 2021-07-01 RX ORDER — LORAZEPAM 1 MG/1
2 TABLET ORAL
Status: DISCONTINUED | OUTPATIENT
Start: 2021-07-01 | End: 2021-07-06 | Stop reason: HOSPADM

## 2021-07-01 RX ORDER — MAGNESIUM HYDROXIDE/ALUMINUM HYDROXICE/SIMETHICONE 120; 1200; 1200 MG/30ML; MG/30ML; MG/30ML
30 SUSPENSION ORAL EVERY 6 HOURS PRN
Status: DISCONTINUED | OUTPATIENT
Start: 2021-07-01 | End: 2021-07-03

## 2021-07-01 RX ORDER — POTASSIUM CHLORIDE 14.9 MG/ML
20 INJECTION INTRAVENOUS
Status: COMPLETED | OUTPATIENT
Start: 2021-07-01 | End: 2021-07-02

## 2021-07-01 RX ORDER — LORAZEPAM 1 MG/1
1 TABLET ORAL
Status: DISCONTINUED | OUTPATIENT
Start: 2021-07-02 | End: 2021-07-02

## 2021-07-01 RX ORDER — FENTANYL CITRATE 50 UG/ML
25 INJECTION, SOLUTION INTRAMUSCULAR; INTRAVENOUS ONCE
Status: COMPLETED | OUTPATIENT
Start: 2021-07-01 | End: 2021-07-01

## 2021-07-01 RX ORDER — HYDROCHLOROTHIAZIDE 12.5 MG/1
12.5 TABLET ORAL DAILY
Status: DISCONTINUED | OUTPATIENT
Start: 2021-07-01 | End: 2021-07-02

## 2021-07-01 RX ORDER — ARIPIPRAZOLE 10 MG/1
5 TABLET ORAL DAILY
Status: DISCONTINUED | OUTPATIENT
Start: 2021-07-02 | End: 2021-07-06 | Stop reason: HOSPADM

## 2021-07-01 RX ORDER — ONDANSETRON 2 MG/ML
4 INJECTION INTRAMUSCULAR; INTRAVENOUS EVERY 6 HOURS PRN
Status: DISCONTINUED | OUTPATIENT
Start: 2021-07-01 | End: 2021-07-06 | Stop reason: HOSPADM

## 2021-07-01 RX ORDER — POTASSIUM CHLORIDE 14.9 MG/ML
20 INJECTION INTRAVENOUS ONCE
Status: DISCONTINUED | OUTPATIENT
Start: 2021-07-01 | End: 2021-07-01

## 2021-07-01 RX ORDER — OXYCODONE HYDROCHLORIDE 5 MG/1
5 TABLET ORAL EVERY 4 HOURS PRN
Status: DISCONTINUED | OUTPATIENT
Start: 2021-07-01 | End: 2021-07-06 | Stop reason: HOSPADM

## 2021-07-01 RX ADMIN — SODIUM CHLORIDE 1000 ML: 0.9 INJECTION, SOLUTION INTRAVENOUS at 17:07

## 2021-07-01 RX ADMIN — POTASSIUM CHLORIDE 20 MEQ: 14.9 INJECTION, SOLUTION INTRAVENOUS at 18:58

## 2021-07-01 RX ADMIN — POTASSIUM CHLORIDE 20 MEQ: 14.9 INJECTION, SOLUTION INTRAVENOUS at 21:01

## 2021-07-01 RX ADMIN — APIXABAN 5 MG: 5 TABLET, FILM COATED ORAL at 23:32

## 2021-07-01 RX ADMIN — OXYCODONE HYDROCHLORIDE 5 MG: 5 TABLET ORAL at 23:32

## 2021-07-01 RX ADMIN — HYDROCHLOROTHIAZIDE 12.5 MG: 12.5 TABLET ORAL at 23:32

## 2021-07-01 RX ADMIN — SODIUM CHLORIDE 125 ML/HR: 0.9 INJECTION, SOLUTION INTRAVENOUS at 19:00

## 2021-07-01 RX ADMIN — LORAZEPAM 2 MG: 1 TABLET ORAL at 23:31

## 2021-07-01 RX ADMIN — MORPHINE SULFATE 2 MG: 2 INJECTION, SOLUTION INTRAMUSCULAR; INTRAVENOUS at 17:08

## 2021-07-01 RX ADMIN — GABAPENTIN 300 MG: 300 CAPSULE ORAL at 23:33

## 2021-07-01 RX ADMIN — FENTANYL CITRATE 25 MCG: 50 INJECTION INTRAMUSCULAR; INTRAVENOUS at 19:44

## 2021-07-01 NOTE — TELEPHONE ENCOUNTER
Patient is calling to advise that she tried tylenol and rest and its not improving  She is going to [facility] ER

## 2021-07-01 NOTE — TELEPHONE ENCOUNTER
Excellent plan  Take the weekend to see if symptoms improve - if not let us know  If anything worsens, go to ER

## 2021-07-01 NOTE — TELEPHONE ENCOUNTER
Patient states that she fell on the L shoulder  Denies any problems moving the arm, no pain on movement  Shoulder is a little sore  Advised that she should monitor for worsening pain, swelling and decreased ROM  If this happens over the long weekend she will need to be seen at urgent care or ER  Call office on Tuesday to let us know how she is doing  Patient verbalized understanding

## 2021-07-01 NOTE — ED PROVIDER NOTES
History  Chief Complaint   Patient presents with    Shoulder Injury     Patient reports had left shoulder replacement on 3/30/21  Maggy Manzano today onto shoulder  good pulse, good cap refill, good strength in had  limited movement  sent by called orthopedics and sent to ED for xray to r/o dislocation  Patient is a 73-year-old female past medical history of intractable migraine, adrenal insufficiency, HTN, HLD, prior hypokalemia, history of DVT/PE on Eliquis, history of left shoulder replacement 03/30/2021 who presents with fall, left shoulder pain  Patient states she tripped over a box earlier today, fell, landing on left shoulder  She denies any head injury, LOC, headache, dizziness, lightheadedness, vision changes, nausea, vomiting  Noted immediate significant pain in her left shoulder  She called her orthopedic surgeon, who recommend Tylenol, ice and heat, which she states provided little relief  She notes significant pain in her left shoulder that limits her range of motion and is worse with movement  She notes some tingling in her left arm, but denies any numbness, weakness  She was directed to the ED to rule out dislocation or fracture  Patient denies any prodromal symptoms prior to fall and has otherwise been in her usual state of health, taking medications as prescribed  While ambulating to x-ray while here in the ED, patient noted some chest discomfort and mild shortness of breath and felt lightheaded  Patient sat down and did not fall, with some improvement of symptoms  She states symptoms have primarily resolved, but still notes mild chest discomfort and lightheadedness with standing up  She denies any and associated radiation of the chest discomfort, nausea, vomiting, diaphoresis, vision changes headache, neck pain or stiffness, numbness, tingling, weakness, leg pain or swelling    Patient also denies any fevers, chills, congestion, cough, abdominal pain, diarrhea, urinary changes, rash           Prior to Admission Medications   Prescriptions Last Dose Informant Patient Reported? Taking? ARIPiprazole (ABILIFY) 15 mg tablet   No No   Sig: Take 0 5 tablets (7 5 mg total) by mouth daily   Patient taking differently: Take 5 mg by mouth daily    ARIPiprazole (ABILIFY) 5 mg tablet   Yes No   Sig: TAKE 1 TABLET EVERY MORNING (CORRECTION BY DR OF PREV SCRIPT)   Erenumab-aooe (AIMOVIG) 140 MG/ML SOAJ   Yes No   Sig: Inject 140 mg under the skin every 30 (thirty) days    LORazepam (ATIVAN) 1 mg tablet   No No   Sig: Take 2 tablets (2 mg total) by mouth 2 (two) times a day for 10 days   Patient taking differently: Take 2 mg by mouth 4 (four) times a day One in morning, one tab afternoon two tablet at bedtime   acetaminophen (TYLENOL) 500 mg tablet   No No   Sig: Take 1 tablet (500 mg total) by mouth every 6 (six) hours as needed (pain)   apixaban (ELIQUIS) 5 mg   Yes No   Sig: Take 5 mg by mouth 2 (two) times a day   apixaban (Eliquis) 5 mg   Yes No   Sig: TAKE 1 TABLET BY MOUTH TWICE A DAY   bisacodyl (DULCOLAX) 10 mg suppository   No No   Sig: Insert 1 suppository (10 mg total) into the rectum daily as needed (3rd line constipation)   cholecalciferol (VITAMIN D3) 1,000 units tablet   Yes No   Sig: Take 5,000 Units by mouth daily   ferrous sulfate 325 (65 Fe) mg tablet   Yes No   Sig: Take 325 mg by mouth daily with breakfast   fluvoxaMINE (LUVOX) 50 mg tablet   No No   Sig: Take 3 tablets (150 mg total) by mouth daily at bedtime   Patient taking differently: Take 300 mg by mouth daily at bedtime    gabapentin (NEURONTIN) 300 mg capsule   No No   Sig: Take 1 capsule (300 mg total) by mouth 3 (three) times a day   lamoTRIgine (LaMICtal) 200 MG tablet   Yes No   Sig: Take 200 mg by mouth daily     lidocaine (LIDODERM) 5 %   No No   Sig: Apply 1 patch topically daily Remove & Discard patch within 12 hours or as directed by MD   magnesium hydroxide (MILK OF MAGNESIA) 400 mg/5 mL oral suspension   No No Sig: Take 30 mL by mouth daily as needed for constipation   meclizine (ANTIVERT) 12 5 MG tablet   No No   Sig: Take 1 tablet (12 5 mg total) by mouth every 8 (eight) hours as needed for dizziness for up to 7 days   methocarbamol (ROBAXIN) 500 mg tablet   No No   Sig: Take 1 tablet (500 mg total) by mouth every 6 (six) hours for 7 days   ondansetron (ZOFRAN) 4 mg tablet   No No   Sig: Take 1 tablet (4 mg total) by mouth every 8 (eight) hours as needed for nausea or vomiting   pantoprazole (PROTONIX) 40 mg tablet   No No   Sig: Take 1 tablet (40 mg total) by mouth daily in the early morning   potassium chloride (K-DUR,KLOR-CON) 20 mEq tablet   Yes No   Sig: Take 40 mEq by mouth daily   senna-docusate sodium (SENOKOT S) 8 6-50 mg per tablet   No No   Sig: Take 1 tablet by mouth daily at bedtime      Facility-Administered Medications: None       Past Medical History:   Diagnosis Date    Adrenal insufficiency ([place]'s disease) ([facility] [address] )     Bipolar disorder     C  difficile diarrhea     Cervical radiculopathy     Chronic back pain     Chronic pain disorder     lumbar    DVT, lower extremity ([facility] [address] ) 1991    Fibromyalgia     History of TIAs     cannot remember details    Hypertension     Hypokalemia     Migraine     MRSA (methicillin resistant Staphylococcus aureus) 07/20/2012    nasal swab negative 4/5/19    Psychiatric disorder     Anxiety, major depression, bipolar    Spinal stenosis     Stroke ([facility] [address] )     tia    Syncope 2014    orthostatic hypotension    Wears dentures     upper       Past Surgical History:   Procedure Laterality Date    APPENDECTOMY      CAST APPLICATION Left 4/9/6305    Procedure: Application short-arm splint;  Surgeon: Erwin Knowles MD;  [facility];  Service: Orthopedics    COLONOSCOPY      GASTRIC RESTRICTION SURGERY      Gastric Sleeve Nov 2015    HYSTERECTOMY      DONALD    JOINT REPLACEMENT      Left Knee 2011 and Right Hip 2010    ORIF WRIST FRACTURE Left 2020    Procedure: Open reduction and internal fixation left radius and ulnar shaft fracture;  Surgeon: Dayron Berger MD;  [facility];  Service: Orthopedics    LA RECONSTR TOTAL SHOULDER IMPLANT Left 3/30/2021    Procedure: ARTHROPLASTY SHOULDER - anatomic;  Surgeon: Cortney Robledo MD;  [facility];  Service: Orthopedics    WISDOM TOOTH EXTRACTION         Family History   Problem Relation Age of Onset    Breast cancer Mother     Migraines Mother     Arthritis Mother     Kidney disease Father     Heart disease Father     Diabetes Father     Arthritis Father     Brain cancer Brother     Seizures Brother      I have reviewed and agree with the history as documented  E-Cigarette/Vaping    E-Cigarette Use Never User     Cartridges/Day 0     Comments 0      E-Cigarette/Vaping Substances    Nicotine No     THC No     CBD No     Flavoring No     Other No     Unknown No      Social History     Tobacco Use    Smoking status: Former Smoker     Packs/day: 0 20     Years: 20 00     Pack years: 4 00     Types: Cigarettes     Start date:      Quit date:      Years since quittin 5    Smokeless tobacco: Never Used    Tobacco comment: quit   Vaping Use    Vaping Use: Never used   Substance Use Topics    Alcohol use: Yes     Alcohol/week: 2 0 standard drinks     Types: 1 Glasses of wine, 1 Standard drinks or equivalent per week     Comment: occasionally    Drug use: Never     Comment: na       Review of Systems   Constitutional: Negative for chills, diaphoresis and fever  HENT: Negative for congestion  Eyes: Negative for visual disturbance  Respiratory: Positive for shortness of breath  Negative for cough, wheezing and stridor  Cardiovascular: Positive for chest pain  Negative for palpitations and leg swelling  Gastrointestinal: Negative for abdominal pain, diarrhea, nausea and vomiting     Genitourinary: Negative for difficulty urinating, dysuria, frequency, hematuria and urgency  Musculoskeletal: Positive for arthralgias (Left shoulder pain)  Negative for myalgias, neck pain and neck stiffness  Skin: Negative for color change, pallor and rash  Neurological: Positive for light-headedness  Negative for dizziness, weakness, numbness and headaches  All other systems reviewed and are negative  Physical Exam  Physical Exam  Vitals and nursing note reviewed  Exam conducted with a chaperone present  Constitutional:       General: She is awake  She is not in acute distress  Appearance: She is well-developed  She is not ill-appearing, toxic-appearing or diaphoretic  HENT:      Head: Normocephalic and atraumatic  No raccoon eyes, Caballero's sign, abrasion, contusion or laceration  Right Ear: Hearing, tympanic membrane, ear canal and external ear normal  No hemotympanum  Left Ear: Hearing, tympanic membrane, ear canal and external ear normal  No hemotympanum  Nose: Nose normal    Eyes:      Extraocular Movements: Extraocular movements intact  Conjunctiva/sclera: Conjunctivae normal       Pupils: Pupils are equal, round, and reactive to light  Cardiovascular:      Rate and Rhythm: Normal rate and regular rhythm  Pulses: Normal pulses  Radial pulses are 2+ on the right side and 2+ on the left side  Posterior tibial pulses are 2+ on the right side and 2+ on the left side  Heart sounds: Normal heart sounds, S1 normal and S2 normal    Pulmonary:      Effort: Pulmonary effort is normal  No respiratory distress  Breath sounds: Normal breath sounds  No stridor  No decreased breath sounds, wheezing, rhonchi or rales  Chest:      Chest wall: Tenderness present  No deformity, crepitus or edema  Abdominal:      General: Bowel sounds are normal  There is no distension  Palpations: Abdomen is soft  Tenderness: There is no abdominal tenderness     Musculoskeletal:      Right shoulder: Normal  Left shoulder: Tenderness and bony tenderness present  No swelling, deformity, effusion or crepitus  Decreased range of motion  Normal pulse  Left upper arm: Normal       Left elbow: Normal       Left wrist: Normal       Cervical back: Normal, normal range of motion and neck supple  No edema or erythema  No pain with movement, spinous process tenderness or muscular tenderness  Normal range of motion  Thoracic back: Normal       Lumbar back: Normal       Comments: Neurovascularly intact, 5/5 strength in bilateral upper extremities  Patient with significantly limited active ROM of left shoulder secondary to pain  Diffuse tenderness to palpation of left shoulder with no mehran deformity, swelling, erythema, crepitus noted  Skin:     General: Skin is warm and dry  Capillary Refill: Capillary refill takes less than 2 seconds  Neurological:      General: No focal deficit present  Mental Status: She is alert and oriented to person, place, and time  GCS: GCS eye subscore is 4  GCS verbal subscore is 5  GCS motor subscore is 6  Psychiatric:         Behavior: Behavior is cooperative           Vital Signs  ED Triage Vitals   Temperature Pulse Respirations Blood Pressure SpO2   07/01/21 1554 07/01/21 1554 07/01/21 1554 07/01/21 1554 07/01/21 1554   98 2 °F (36 8 °C) 84 16 (!) 233/107 98 %      Temp Source Heart Rate Source Patient Position - Orthostatic VS BP Location FiO2 (%)   07/01/21 1554 07/01/21 1554 07/01/21 1554 07/01/21 1554 --   Oral Monitor Sitting Right arm       Pain Score       07/01/21 1708       9           Vitals:    07/01/21 1754 07/01/21 1756 07/01/21 1759 07/01/21 1945   BP: 135/63 (!) 176/81 161/74 169/70   Pulse: 74 75 77 76   Patient Position - Orthostatic VS: Lying Sitting Standing          Visual Acuity      ED Medications  Medications   potassium chloride 20 mEq IVPB (premix) (20 mEq Intravenous New Bag 7/1/21 2101)   potassium chloride 40 mEq IVPB (premix) (has no administration in time range)   sodium chloride 0 9 % bolus 1,000 mL (1,000 mL Intravenous New Bag 7/1/21 1707)   morphine injection 2 mg (2 mg Intravenous Given 7/1/21 1708)   fentanyl citrate (PF) 100 MCG/2ML 25 mcg (25 mcg Intravenous Given 7/1/21 1944)       Diagnostic Studies  Results Reviewed     Procedure Component Value Units Date/Time    Potassium [[id number]]     Lab Status: No result Specimen: Blood     Potassium, urine, random [[id number]] Collected: 07/01/21 1753    Lab Status:  In process Specimen: Urine Updated: 07/01/21 2101    Troponin I [[id number]]  (Normal) Collected: 07/01/21 2015    Lab Status: Final result Specimen: Blood from Arm, Left Updated: 07/01/21 2050     Troponin I <0 02 ng/mL     Magnesium [[id number]]  (Normal) Collected: 07/01/21 1707    Lab Status: Final result Specimen: Blood from Arm, Right Updated: 07/01/21 1922     Magnesium 2 1 mg/dL     Phosphorus [[id number]]  (Normal) Collected: 07/01/21 1707    Lab Status: Final result Specimen: Blood from Arm, Right Updated: 07/01/21 1922     Phosphorus 3 0 mg/dL     Urine Microscopic [[id number]]  (Abnormal) Collected: 07/01/21 1753    Lab Status: Final result Specimen: Urine, Clean Catch Updated: 07/01/21 1835     RBC, UA None Seen /hpf      WBC, UA 0-1 /hpf      Epithelial Cells Occasional /hpf      Bacteria, UA Occasional /hpf     Comprehensive metabolic panel [[id number]]  (Abnormal) Collected: 07/01/21 1707    Lab Status: Final result Specimen: Blood from Arm, Right Updated: 07/01/21 1800     Sodium 144 mmol/L      Potassium 1 8 mmol/L      Chloride 100 mmol/L      CO2 42 mmol/L      ANION GAP 2 mmol/L      BUN 12 mg/dL      Creatinine 0 91 mg/dL      Glucose 89 mg/dL      Calcium 8 8 mg/dL      Corrected Calcium 9 8 mg/dL      AST 23 U/L      ALT 19 U/L      Alkaline Phosphatase 147 U/L      Total Protein 6 6 g/dL      Albumin 2 7 g/dL      Total Bilirubin 0 20 mg/dL      eGFR 68 ml/min/1 73sq m     Narrative:      National Kidney Disease Foundation guidelines for Chronic Kidney Disease (CKD):     Stage 1 with normal or high GFR (GFR > 90 mL/min/1 73 square meters)    Stage 2 Mild CKD (GFR = 60-89 mL/min/1 73 square meters)    Stage 3A Moderate CKD (GFR = 45-59 mL/min/1 73 square meters)    Stage 3B Moderate CKD (GFR = 30-44 mL/min/1 73 square meters)    Stage 4 Severe CKD (GFR = 15-29 mL/min/1 73 square meters)    Stage 5 End Stage CKD (GFR <15 mL/min/1 73 square meters)  Note: GFR calculation is accurate only with a steady state creatinine    Lipase [[id number]]  (Abnormal) Collected: 07/01/21 1707    Lab Status: Final result Specimen: Blood from Arm, Right Updated: 07/01/21 1758     Lipase 45 u/L     TSH [[id number]]  (Normal) Collected: 07/01/21 1707    Lab Status: Final result Specimen: Blood from Arm, Right Updated: 07/01/21 1758     TSH 3RD GENERATON 0 999 uIU/mL     Narrative:      Patients undergoing fluorescein dye angiography may retain small amounts of fluorescein in the body for 48-72 hours post procedure  Samples containing fluorescein can produce falsely depressed TSH values  If the patient had this procedure,a specimen should be resubmitted post fluorescein clearance        Urine Macroscopic, POC [[id number]]  (Abnormal) Collected: 07/01/21 1753    Lab Status: Final result Specimen: Urine Updated: 07/01/21 1755     Color, UA Yellow     Clarity, UA Clear     pH, UA 7 0     Leukocytes, UA Trace     Nitrite, UA Negative     Protein, UA Negative mg/dl      Glucose, UA Negative mg/dl      Ketones, UA Negative mg/dl      Urobilinogen, UA 0 2 E U /dl      Bilirubin, UA Negative     Blood, UA Negative     Specific Gravity, UA 1 010    Narrative:      CLINITEK RESULT    Protime-INR [[id number]]  (Abnormal) Collected: 07/01/21 1707    Lab Status: Final result Specimen: Blood from Arm, Right Updated: 07/01/21 1750     Protime 15 1 seconds      INR 1 22    APTT [[id number]]  (Normal) Collected: 07/01/21 1707    Lab Status: Final result Specimen: Blood from Arm, Right Updated: 07/01/21 1750     PTT 34 seconds     Troponin I [[id number]]  (Normal) Collected: 07/01/21 1707    Lab Status: Final result Specimen: Blood from Arm, Right Updated: 07/01/21 1749     Troponin I <0 02 ng/mL     CBC and differential [[id number]]  (Abnormal) Collected: 07/01/21 1707    Lab Status: Final result Specimen: Blood from Arm, Right Updated: 07/01/21 1721     WBC 9 34 Thousand/uL      RBC 3 87 Million/uL      Hemoglobin 11 5 g/dL      Hematocrit 35 4 %      MCV 92 fL      MCH 29 7 pg      MCHC 32 5 g/dL      RDW 14 7 %      MPV 9 2 fL      Platelets 830 Thousands/uL      nRBC 0 /100 WBCs      Neutrophils Relative 64 %      Immat GRANS % 0 %      Lymphocytes Relative 23 %      Monocytes Relative 10 %      Eosinophils Relative 3 %      Basophils Relative 0 %      Neutrophils Absolute 5 91 Thousands/µL      Immature Grans Absolute 0 04 Thousand/uL      Lymphocytes Absolute 2 10 Thousands/µL      Monocytes Absolute 0 97 Thousand/µL      Eosinophils Absolute 0 28 Thousand/µL      Basophils Absolute 0 04 Thousands/µL                  CT head without contrast   Final Result by Shelby Oswald MD (07/01 1834)      No acute intracranial abnormality  Workstation performed: [id number]         CT spine cervical without contrast   Final Result by Shelby Oswald MD (07/01 1830)      No cervical spine fracture or traumatic malalignment  Workstation performed: [id number]         XR chest 2 views   ED Interpretation by Keyla Owen PA-C (07/01 1746)   No acute pathology noted      XR shoulder 2+ views LEFT   Final Result by Shelby Oswald MD (07/01 1728)      Left shoulder arthroplasty in anatomic alignment  No acute fracture or dislocation        Workstation performed: [id number]                    Procedures  ECG 12 Lead Documentation Only    Date/Time: 7/1/2021 4:35 PM  Performed by: Keyla Owen PA-C  Authorized by: Keyla Owen PA-C     Indications / Diagnosis:  Chest pain  ECG reviewed by me, the ED Provider: yes    Patient location:  ED  Previous ECG:     Previous ECG:  Compared to current    Comparison ECG info:  [id number]  Rate:     ECG rate:  78    ECG rate assessment: normal    Rhythm:     Rhythm: sinus rhythm    Ectopy:     Ectopy: none    QRS:     QRS axis:  Normal  Conduction:     Conduction: normal    ST segments:     ST segments:  Normal  T waves:     T waves: inverted      Inverted:  III (present on prior)  Other findings:     Other findings: LVH      ECG 12 Lead Documentation Only    Date/Time: 7/1/2021 8:20 PM  Performed by: Maximilian Maria PA-C  Authorized by: Maximilian Maria PA-C     Indications / Diagnosis:  Chest pain  ECG reviewed by me, the ED Provider: yes    Patient location:  ED  Previous ECG:     Previous ECG:  Compared to current    Comparison ECG info:  Earlier today  Rate:     ECG rate:  77    ECG rate assessment: normal    Rhythm:     Rhythm: sinus rhythm    Ectopy:     Ectopy: none    QRS:     QRS axis:  Normal  Conduction:     Conduction: normal    ST segments:     ST segments:  Normal  T waves:     T waves: non-specific    Other findings:     Other findings: LVH               ED Course  ED Course as of Jul 01 2113   Thu Jul 01, 2021   1745 IMPRESSION:     Left shoulder arthroplasty in anatomic alignment  No acute fracture or dislocation  XR shoulder 2+ views LEFT   1807 Will replete and check Mg, phos   Potassium(!!): 1 8   1808 Troponin I: <0 02   1831 Reviewed all results thus far with patient, answered questions  Patient states she has been taking her potassium as prescribed and is not currently taking her Lasix, which was previously believed to be because of hypokalemia based on chart review  Patient notes improvement of chest pain, but still notes pain in the left shoulder  Will give additional pain medication  Patient is agreeable to plan for admission  1851 IMPRESSION:     No acute intracranial abnormality  CT head without contrast   1851 IMPRESSION:     No cervical spine fracture or traumatic malalignment  CT spine cervical without contrast   1900 Paged [id number] Dave Gonzalez with Dr Jarrett Sargent, reviewed case and ED course  Agreeable to inpatient admission                HEART Risk Score      Most Recent Value   Heart Score Risk Calculator   History  1 Filed at: 07/01/2021 2022   ECG  0 Filed at: 07/01/2021 2022   Age  1 Filed at: 07/01/2021 2022   Risk Factors  1 Filed at: 07/01/2021 2022   Troponin  0 Filed at: 07/01/2021 2022   HEART Score  3 Filed at: 07/01/2021 2022                      SBIRT 20yo+      Most Recent Value   SBIRT (23 yo +)   In order to provide better care to our patients, we are screening all of our patients for alcohol and drug use  Would it be okay to ask you these screening questions? No Filed at: 07/01/2021 1624   Initial Alcohol Screen: US AUDIT-C    1  How often do you have a drink containing alcohol?  0 Filed at: 07/01/2021 1624   2  How many drinks containing alcohol do you have on a typical day you are drinking? 0 Filed at: 07/01/2021 1624   3a  Male UNDER 65: How often do you have five or more drinks on one occasion? 0 Filed at: 07/01/2021 1624   3b  FEMALE Any Age, or MALE 65+: How often do you have 4 or more drinks on one occassion? 0 Filed at: 07/01/2021 1624   Audit-C Score  0 Filed at: 07/01/2021 1624                    [facility]  Number of Diagnoses or Management Options     Amount and/or Complexity of Data Reviewed  Discuss the patient with other providers: yes (Dr Teri Dawson)        Disposition  Final diagnoses:   Fall, initial encounter   Acute pain of left shoulder   Hypokalemia   Chest pain   Lightheadedness     Time reflects when diagnosis was documented in both MDM as applicable and the Disposition within this note     Time User Action Codes Description Comment    7/1/2021  8:21 PM Onesimo Nino Add [[id number]  [place]] Fall, initial encounter     7/1/2021  8:21 PM [address] [[id number]] Acute pain of left shoulder     7/1/2021  8:21 PM Costlow, Joan Mercy Add [E87 [id number]] Hypokalemia     7/1/2021  8:21 PM Costlow, Joan Mercy Add [R07 [id number]] Chest pain     7/1/2021  8:22 PM Costlow, Joan Mercy Add [R42] Lightheadedness       ED Disposition     ED Disposition Condition Date/Time Comment    Admit Stable Thu Jul 1, 2021  8:22 PM Case was discussed with Dr Gael Arvizu and the patient's admission status was agreed to be Admission Status: inpatient status to the service of Dr Gael Arvizu  Follow-up Information    None         Patient's Medications   Discharge Prescriptions    No medications on file     No discharge procedures on file      PDMP Review       Value Time User    PDMP Reviewed  Yes 4/6/2021  6:24 PM Amaya Hidalgo MD          ED Provider  Electronically Signed by           Jovany Humphrey PA-C  07/01/21 2112       Jovany Humphrey PA-C  07/01/21 2113

## 2021-07-01 NOTE — TELEPHONE ENCOUNTER
Patient sees Adrien Acosta    Patient just fell on her L Shoulder and she is concerned she might have hurt it, patient had surgery in March      Please Advise  cb - [phone]

## 2021-07-02 LAB
ANION GAP SERPL CALCULATED.3IONS-SCNC: 2 MMOL/L (ref 4–13)
ANION GAP SERPL CALCULATED.3IONS-SCNC: 6 MMOL/L (ref 4–13)
ARTERIAL PATENCY WRIST A: YES
BASE EX.OXY STD BLDV CALC-SCNC: 85.4 % (ref 60–80)
BASE EXCESS BLDV CALC-SCNC: 8.2 MMOL/L
BASOPHILS # BLD AUTO: 0.06 THOUSANDS/ΜL (ref 0–0.1)
BASOPHILS NFR BLD AUTO: 1 % (ref 0–1)
BUN SERPL-MCNC: 10 MG/DL (ref 5–25)
BUN SERPL-MCNC: 11 MG/DL (ref 5–25)
CALCIUM SERPL-MCNC: 8.2 MG/DL (ref 8.3–10.1)
CALCIUM SERPL-MCNC: 8.4 MG/DL (ref 8.3–10.1)
CHLORIDE SERPL-SCNC: 102 MMOL/L (ref 100–108)
CHLORIDE SERPL-SCNC: 104 MMOL/L (ref 100–108)
CO2 SERPL-SCNC: 37 MMOL/L (ref 21–32)
CO2 SERPL-SCNC: 38 MMOL/L (ref 21–32)
CREAT SERPL-MCNC: 0.79 MG/DL (ref 0.6–1.3)
CREAT SERPL-MCNC: 0.92 MG/DL (ref 0.6–1.3)
CREAT UR-MCNC: 113.4 MG/DL
EOSINOPHIL # BLD AUTO: 0.3 THOUSAND/ΜL (ref 0–0.61)
EOSINOPHIL NFR BLD AUTO: 3 % (ref 0–6)
ERYTHROCYTE [DISTWIDTH] IN BLOOD BY AUTOMATED COUNT: 14.8 % (ref 11.6–15.1)
GFR SERPL CREATININE-BSD FRML MDRD: 67 ML/MIN/1.73SQ M
GFR SERPL CREATININE-BSD FRML MDRD: 80 ML/MIN/1.73SQ M
GLUCOSE SERPL-MCNC: 92 MG/DL (ref 65–140)
GLUCOSE SERPL-MCNC: 95 MG/DL (ref 65–140)
HCO3 BLDV-SCNC: 35.3 MMOL/L (ref 24–30)
HCT VFR BLD AUTO: 32.6 % (ref 34.8–46.1)
HGB BLD-MCNC: 10.4 G/DL (ref 11.5–15.4)
IMM GRANULOCYTES # BLD AUTO: 0.05 THOUSAND/UL (ref 0–0.2)
IMM GRANULOCYTES NFR BLD AUTO: 1 % (ref 0–2)
LYMPHOCYTES # BLD AUTO: 2.34 THOUSANDS/ΜL (ref 0.6–4.47)
LYMPHOCYTES NFR BLD AUTO: 25 % (ref 14–44)
MCH RBC QN AUTO: 29.4 PG (ref 26.8–34.3)
MCHC RBC AUTO-ENTMCNC: 31.9 G/DL (ref 31.4–37.4)
MCV RBC AUTO: 92 FL (ref 82–98)
MONOCYTES # BLD AUTO: 0.95 THOUSAND/ΜL (ref 0.17–1.22)
MONOCYTES NFR BLD AUTO: 10 % (ref 4–12)
NEUTROPHILS # BLD AUTO: 5.85 THOUSANDS/ΜL (ref 1.85–7.62)
NEUTS SEG NFR BLD AUTO: 60 % (ref 43–75)
NRBC BLD AUTO-RTO: 0 /100 WBCS
O2 CT BLDV-SCNC: 14 ML/DL
PCO2 BLDV: 62 MM HG (ref 42–50)
PH BLDV: 7.37 [PH] (ref 7.3–7.4)
PLATELET # BLD AUTO: 407 THOUSANDS/UL (ref 149–390)
PMV BLD AUTO: 9.6 FL (ref 8.9–12.7)
PO2 BLDV: 57.4 MM HG (ref 35–45)
POTASSIUM SERPL-SCNC: 2.3 MMOL/L (ref 3.5–5.3)
POTASSIUM SERPL-SCNC: 2.4 MMOL/L (ref 3.5–5.3)
POTASSIUM SERPL-SCNC: 3.1 MMOL/L (ref 3.5–5.3)
POTASSIUM SERPL-SCNC: 3.2 MMOL/L (ref 3.5–5.3)
POTASSIUM UR-SCNC: 23.8 MMOL/L
RBC # BLD AUTO: 3.54 MILLION/UL (ref 3.81–5.12)
SODIUM SERPL-SCNC: 144 MMOL/L (ref 136–145)
SODIUM SERPL-SCNC: 145 MMOL/L (ref 136–145)
WBC # BLD AUTO: 9.55 THOUSAND/UL (ref 4.31–10.16)

## 2021-07-02 PROCEDURE — 84133 ASSAY OF URINE POTASSIUM: CPT | Performed by: INTERNAL MEDICINE

## 2021-07-02 PROCEDURE — 80048 BASIC METABOLIC PNL TOTAL CA: CPT | Performed by: PHYSICIAN ASSISTANT

## 2021-07-02 PROCEDURE — 84244 ASSAY OF RENIN: CPT | Performed by: PHYSICIAN ASSISTANT

## 2021-07-02 PROCEDURE — 85025 COMPLETE CBC W/AUTO DIFF WBC: CPT | Performed by: PHYSICIAN ASSISTANT

## 2021-07-02 PROCEDURE — 82088 ASSAY OF ALDOSTERONE: CPT | Performed by: PHYSICIAN ASSISTANT

## 2021-07-02 PROCEDURE — 82570 ASSAY OF URINE CREATININE: CPT | Performed by: INTERNAL MEDICINE

## 2021-07-02 PROCEDURE — 82805 BLOOD GASES W/O2 SATURATION: CPT | Performed by: INTERNAL MEDICINE

## 2021-07-02 PROCEDURE — 80048 BASIC METABOLIC PNL TOTAL CA: CPT | Performed by: INTERNAL MEDICINE

## 2021-07-02 PROCEDURE — 99254 IP/OBS CNSLTJ NEW/EST MOD 60: CPT | Performed by: INTERNAL MEDICINE

## 2021-07-02 PROCEDURE — 99232 SBSQ HOSP IP/OBS MODERATE 35: CPT | Performed by: PHYSICIAN ASSISTANT

## 2021-07-02 PROCEDURE — 84132 ASSAY OF SERUM POTASSIUM: CPT | Performed by: PHYSICIAN ASSISTANT

## 2021-07-02 RX ORDER — POTASSIUM CHLORIDE 20 MEQ/1
40 TABLET, EXTENDED RELEASE ORAL 2 TIMES DAILY
Status: DISCONTINUED | OUTPATIENT
Start: 2021-07-02 | End: 2021-07-02

## 2021-07-02 RX ORDER — POTASSIUM CHLORIDE 20 MEQ/1
40 TABLET, EXTENDED RELEASE ORAL 3 TIMES DAILY
Status: DISCONTINUED | OUTPATIENT
Start: 2021-07-02 | End: 2021-07-02

## 2021-07-02 RX ORDER — LORAZEPAM 1 MG/1
2 TABLET ORAL
Status: DISCONTINUED | OUTPATIENT
Start: 2021-07-02 | End: 2021-07-06 | Stop reason: HOSPADM

## 2021-07-02 RX ORDER — POTASSIUM CHLORIDE 14.9 MG/ML
20 INJECTION INTRAVENOUS ONCE
Status: COMPLETED | OUTPATIENT
Start: 2021-07-02 | End: 2021-07-02

## 2021-07-02 RX ORDER — POTASSIUM CHLORIDE 20 MEQ/1
40 TABLET, EXTENDED RELEASE ORAL DAILY
Status: DISCONTINUED | OUTPATIENT
Start: 2021-07-03 | End: 2021-07-04

## 2021-07-02 RX ORDER — SUMATRIPTAN 50 MG/1
50 TABLET, FILM COATED ORAL ONCE
Status: COMPLETED | OUTPATIENT
Start: 2021-07-02 | End: 2021-07-02

## 2021-07-02 RX ORDER — SODIUM CHLORIDE 9 MG/ML
75 INJECTION, SOLUTION INTRAVENOUS CONTINUOUS
Status: DISCONTINUED | OUTPATIENT
Start: 2021-07-02 | End: 2021-07-02

## 2021-07-02 RX ORDER — LOPERAMIDE HYDROCHLORIDE 2 MG/1
2 CAPSULE ORAL 3 TIMES DAILY PRN
Status: DISCONTINUED | OUTPATIENT
Start: 2021-07-02 | End: 2021-07-06 | Stop reason: HOSPADM

## 2021-07-02 RX ORDER — POTASSIUM CHLORIDE 14.9 MG/ML
20 INJECTION INTRAVENOUS
Status: COMPLETED | OUTPATIENT
Start: 2021-07-02 | End: 2021-07-02

## 2021-07-02 RX ORDER — POTASSIUM CHLORIDE 14.9 MG/ML
20 INJECTION INTRAVENOUS 3 TIMES DAILY
Status: DISCONTINUED | OUTPATIENT
Start: 2021-07-02 | End: 2021-07-02

## 2021-07-02 RX ORDER — POTASSIUM CHLORIDE 20 MEQ/1
40 TABLET, EXTENDED RELEASE ORAL ONCE
Status: COMPLETED | OUTPATIENT
Start: 2021-07-02 | End: 2021-07-02

## 2021-07-02 RX ADMIN — LORAZEPAM 2 MG: 1 TABLET ORAL at 21:06

## 2021-07-02 RX ADMIN — POTASSIUM CHLORIDE 20 MEQ: 14.9 INJECTION, SOLUTION INTRAVENOUS at 08:21

## 2021-07-02 RX ADMIN — APIXABAN 5 MG: 5 TABLET, FILM COATED ORAL at 08:31

## 2021-07-02 RX ADMIN — POTASSIUM CHLORIDE 20 MEQ: 14.9 INJECTION, SOLUTION INTRAVENOUS at 05:53

## 2021-07-02 RX ADMIN — PANTOPRAZOLE SODIUM 40 MG: 40 TABLET, DELAYED RELEASE ORAL at 05:48

## 2021-07-02 RX ADMIN — GABAPENTIN 300 MG: 300 CAPSULE ORAL at 21:06

## 2021-07-02 RX ADMIN — POTASSIUM CHLORIDE 40 MEQ: 1500 TABLET, EXTENDED RELEASE ORAL at 01:17

## 2021-07-02 RX ADMIN — POTASSIUM CHLORIDE 20 MEQ: 14.9 INJECTION, SOLUTION INTRAVENOUS at 01:31

## 2021-07-02 RX ADMIN — OXYCODONE HYDROCHLORIDE 5 MG: 5 TABLET ORAL at 23:57

## 2021-07-02 RX ADMIN — OXYCODONE HYDROCHLORIDE 5 MG: 5 TABLET ORAL at 03:35

## 2021-07-02 RX ADMIN — OXYCODONE HYDROCHLORIDE 5 MG: 5 TABLET ORAL at 12:12

## 2021-07-02 RX ADMIN — LOPERAMIDE HYDROCHLORIDE 2 MG: 2 CAPSULE ORAL at 10:34

## 2021-07-02 RX ADMIN — HYDROCHLOROTHIAZIDE 12.5 MG: 12.5 TABLET ORAL at 08:31

## 2021-07-02 RX ADMIN — SODIUM CHLORIDE 75 ML/HR: 0.9 INJECTION, SOLUTION INTRAVENOUS at 10:31

## 2021-07-02 RX ADMIN — GABAPENTIN 300 MG: 300 CAPSULE ORAL at 17:00

## 2021-07-02 RX ADMIN — ONDANSETRON 4 MG: 2 INJECTION INTRAMUSCULAR; INTRAVENOUS at 08:25

## 2021-07-02 RX ADMIN — ARIPIPRAZOLE 5 MG: 10 TABLET ORAL at 08:31

## 2021-07-02 RX ADMIN — SUMATRIPTAN SUCCINATE 50 MG: 50 TABLET ORAL at 21:08

## 2021-07-02 RX ADMIN — ACETAMINOPHEN 650 MG: 325 TABLET, FILM COATED ORAL at 16:55

## 2021-07-02 RX ADMIN — GABAPENTIN 300 MG: 300 CAPSULE ORAL at 08:31

## 2021-07-02 RX ADMIN — OXYCODONE HYDROCHLORIDE 5 MG: 5 TABLET ORAL at 19:27

## 2021-07-02 RX ADMIN — APIXABAN 5 MG: 5 TABLET, FILM COATED ORAL at 17:00

## 2021-07-02 RX ADMIN — FLUVOXAMINE MALEATE 300 MG: 50 TABLET ORAL at 21:06

## 2021-07-02 RX ADMIN — LIDOCAINE 1 PATCH: 50 PATCH TOPICAL at 00:16

## 2021-07-02 RX ADMIN — ACETAMINOPHEN 650 MG: 325 TABLET, FILM COATED ORAL at 01:21

## 2021-07-02 RX ADMIN — POTASSIUM CHLORIDE 20 MEQ: 14.9 INJECTION, SOLUTION INTRAVENOUS at 10:30

## 2021-07-02 RX ADMIN — LORAZEPAM 1 MG: 1 TABLET ORAL at 08:25

## 2021-07-02 RX ADMIN — LAMOTRIGINE 200 MG: 100 TABLET ORAL at 08:32

## 2021-07-02 RX ADMIN — FLUVOXAMINE MALEATE 300 MG: 50 TABLET ORAL at 00:16

## 2021-07-02 RX ADMIN — LIDOCAINE 1 PATCH: 50 PATCH TOPICAL at 21:07

## 2021-07-02 RX ADMIN — POTASSIUM CHLORIDE 40 MEQ: 1500 TABLET, EXTENDED RELEASE ORAL at 12:29

## 2021-07-02 RX ADMIN — LORAZEPAM 2 MG: 1 TABLET ORAL at 14:24

## 2021-07-02 NOTE — PLAN OF CARE
Problem: Potential for Falls  Goal: Patient will remain free of falls  Description: INTERVENTIONS:  - Educate patient/family on patient safety including physical limitations  - Instruct patient to call for assistance with activity   - Consult OT/PT to assist with strengthening/mobility   - Keep Call bell within reach  - Keep bed low and locked with side rails adjusted as appropriate  - Keep care items and personal belongings within reach  - Initiate and maintain comfort rounds  - Make Fall Risk Sign visible to staff  - Offer Toileting every 2 Hours, in advance of need  - Apply yellow socks and bracelet for high fall risk patients  - Consider moving patient to room near nurses station  Outcome: Progressing     Problem: METABOLIC, FLUID AND ELECTROLYTES - ADULT  Goal: Electrolytes maintained within normal limits  Description: INTERVENTIONS:  - Monitor labs and assess patient for signs and symptoms of electrolyte imbalances  - Administer electrolyte replacement as ordered  - Monitor response to electrolyte replacements, including repeat lab results as appropriate  - Instruct patient on fluid and nutrition as appropriate  Outcome: Progressing     Problem: PAIN - ADULT  Goal: Verbalizes/displays adequate comfort level or baseline comfort level  Description: Interventions:  - Encourage patient to monitor pain and request assistance  - Assess pain using appropriate pain scale  - Administer analgesics based on type and severity of pain and evaluate response  - Implement non-pharmacological measures as appropriate and evaluate response  - Consider cultural and social influences on pain and pain management  - Notify physician/advanced practitioner if interventions unsuccessful or patient reports new pain  Outcome: Progressing     Goal: Maintain or return to baseline ADL function  Description: INTERVENTIONS:  -  Assess patient's ability to carry out ADLs; assess patient's baseline for ADL function and identify physical deficits which impact ability to perform ADLs (bathing, care of mouth/teeth, toileting, grooming, dressing, etc )  - Assess/evaluate cause of self-care deficits   - Assess range of motion  - Assess patient's mobility; develop plan if impaired  - Assess patient's need for assistive devices and provide as appropriate  - Encourage maximum independence but intervene and supervise when necessary  - Involve family in performance of ADLs  - Assess for home care needs following discharge   - Consider OT consult to assist with ADL evaluation and planning for discharge  - Provide patient education as appropriate  Outcome: Progressing  Goal: Maintains/Returns to pre admission functional level  Description: INTERVENTIONS:  - Perform BMAT or MOVE assessment daily    - Set and communicate daily mobility goal to care team and patient/family/caregiver     - Collaborate with rehabilitation services on mobility goals if consulted  - Out of bed to chair 3 times a day   - Out of bed for meals 3 times a day  - Out of bed for toileting  - Record patient progress and toleration of activity level   Outcome: Progressing

## 2021-07-02 NOTE — ASSESSMENT & PLAN NOTE
Initially presented to the emergency department after a fall in her left shoulder  With recent left shoulder arthroplasty, outpatient orthopedist recommended come to the emergency department to rule out dislocation  · X-ray of left shoulder revealed left shoulder arthroplasty in anatomic alignment  No acute fracture dislocation    · Lidocaine patch ordered  · Ice

## 2021-07-02 NOTE — ASSESSMENT & PLAN NOTE
With a history of hypokalemia, previously hospitalized in March of 2020 at [facility] for hypokalemia of 2 8  Patient with history of Bennett's disease, not currently on any steroid therapy, follows with [facility] endocrinology last evaluated in December of 2020  · Hypokalemia of 1 8 today  · Will replete  · Suspect secondary to GI losses as the patient has chronic diarrhea and goes approximately 5 times a day however will workup for additional causes of hypokalemia  · Aldosterone/renin level in a m   · Urine potassium pending  · BMP in a m    · Telemetry  · Mag 2 1  · Without hypotension, no concern for adrenal crisis at this time  · Will consult nephrology for further workup

## 2021-07-02 NOTE — H&P
[facility] 1960, 56 [facility]  female MRN: [id number]  Unit/Bed#: ED 19 Encounter: [id number]  Primary Care Provider: Tyree Garcia MD   Date and time admitted to hospital: 7/1/2021  4:20 PM    * Hypokalemia  Assessment & Plan  With a history of hypokalemia, previously hospitalized in March of 2020 at [facility] for hypokalemia of 2 8  Patient with history of [place]'s disease, not currently on any steroid therapy, follows with [facility] endocrinology last evaluated in December of 2020  · Hypokalemia of 1 8 today  · Will replete  · Suspect secondary to GI losses as the patient has chronic diarrhea and goes approximately 5 times a day however will workup for additional causes of hypokalemia  · Aldosterone/renin level in a m   · Urine potassium pending  · BMP in a m  · Mag 2 1  · Without hypotension, no concern for adrenal crisis at this time  · Will consult nephrology for further workup    History of pulmonary embolism  Assessment & Plan  · With history of pulmonary embolism  · On chronic anticoagulation with Eliquis 5 mg b i d , will continue at this time    Shoulder pain  Assessment & Plan  Initially presented to the emergency department after a fall in her left shoulder  With recent left shoulder arthroplasty, outpatient orthopedist recommended come to the emergency department to rule out dislocation  · X-ray of left shoulder revealed left shoulder arthroplasty in anatomic alignment  No acute fracture dislocation  · Lidocaine patch ordered  · Ice    Bipolar disorder  Assessment & Plan  · Continue Abilify 5 mg q d    · Luvox 300 mg HS    History of migraine  Assessment & Plan  · High utilizer  · Follows closely with outpatient Neurology  · Review high utilizer plan      VTE Prophylaxis: Apixaban (Eliquis)  / sequential compression device   Code Status:  Full code  POLST: There is no POLST form on file for this patient (pre-hospital)  Discussion with family:  Discussed plan of care with patient, answered any questions  Anticipated Length of Stay:  Patient will be admitted on an Inpatient basis with an anticipated length of stay of  greater than 2 midnights  Justification for Hospital Stay:  Potassium repletion    Total Time for Visit, including Counseling / Coordination of Care: 70 minutes  Greater than 50% of this total time spent on direct patient counseling and coordination of care  Chief Complaint:   Shoulder pain    History of Present Illness:    Neris Campos is a 56 y o  female with past medical history bipolar disorder, intractable migraines, pulmonary embolism on chronic anticoagulation with Eliquis, and remote history of Andrés's disease who initially presented to the emergency department for evaluation of left shoulder pain after mechanical fall  According to the patient, she recently had a left shoulder replacement  She called her outpatient orthopedic provider who recommended that she come to the emergency department for evaluation of her left shoulder pain after she fell in order to rule out dislocation  While she was in the emergency department, labs were drawn and noted that she had significant hypokalemia with a potassium of 1 8  Repletion was started in the emergency department  EKG without any acute findings at this time  According to the patient, she reports chronic diarrhea, going to the bathroom approximately 5 times a day  States that over the course of last few days, diarrhea has increased in frequency/quantity  She also reports previously being admitted to [facility] approximately 1 year ago for hypokalemia of about 2 8  She states that she takes 20 mg of potassium every day as prescribed  Patient will be admitted for potassium replacement and further workup  Review of Systems:    Review of Systems   Constitutional: Negative for chills, diaphoresis, fatigue and fever  HENT: Negative for ear pain and sore throat  Eyes: Negative for pain and visual disturbance  Respiratory: Negative for cough and shortness of breath  Cardiovascular: Negative for chest pain and palpitations  Gastrointestinal: Positive for diarrhea  Negative for abdominal pain, blood in stool, nausea and vomiting  Genitourinary: Negative for dysuria, flank pain, frequency, hematuria and urgency  Musculoskeletal: Positive for joint swelling  Negative for arthralgias and back pain  Skin: Negative for color change and rash  Neurological: Positive for dizziness  Negative for tremors, seizures, syncope, light-headedness, numbness and headaches         Past Medical and Surgical History:     Past Medical History:   Diagnosis Date    Adrenal insufficiency (Andrés's disease) ([facility] )     Bipolar disorder     C  difficile diarrhea     Cervical radiculopathy     Chronic back pain     Chronic pain disorder     lumbar    DVT, lower extremity ([facility] ) 1991    Fibromyalgia     History of TIAs     cannot remember details    Hypertension     Hypokalemia     Migraine     MRSA (methicillin resistant Staphylococcus aureus) 07/20/2012    nasal swab negative 4/5/19    Psychiatric disorder     Anxiety, major depression, bipolar    Spinal stenosis     Stroke ([facility] )     tia    Syncope 2014    orthostatic hypotension    Wears dentures     upper       Past Surgical History:   Procedure Laterality Date    APPENDECTOMY      CAST APPLICATION Left 8/2/5783    Procedure: Application short-arm splint;  Surgeon: Néstor Jacobsen MD;  [facility]: [facility];  Service: Orthopedics    COLONOSCOPY      GASTRIC RESTRICTION SURGERY      Gastric Sleeve Nov 2015    HYSTERECTOMY      DONALD    JOINT REPLACEMENT      Left Knee 2011 and Right Hip 2010    ORIF WRIST FRACTURE Left 7/4/2020    Procedure: Open reduction and internal fixation left radius and ulnar shaft fracture;  Surgeon: Néstor Jacobsen MD;  [facility];  Service: Orthopedics    MD RECONSTR TOTAL SHOULDER IMPLANT Left 3/30/2021    Procedure: ARTHROPLASTY SHOULDER - anatomic;  Surgeon: Faizan Escalera MD;  [facility];  Service: Orthopedics    WISDOM TOOTH EXTRACTION         Meds/Allergies:    Prior to Admission medications    Medication Sig Start Date End Date Taking? Authorizing Provider   acetaminophen (TYLENOL) 500 mg tablet Take 1 tablet (500 mg total) by mouth every 6 (six) hours as needed (pain) 7/30/19   Martinez Harper PA-C   apixaban (ELIQUIS) 5 mg Take 5 mg by mouth 2 (two) times a day    Historical Provider, MD   apixaban (Eliquis) 5 mg TAKE 1 TABLET BY MOUTH TWICE A DAY 3/22/21   Historical Provider, MD   ARIPiprazole (ABILIFY) 15 mg tablet Take 0 5 tablets (7 5 mg total) by mouth daily  Patient taking differently: Take 5 mg by mouth daily  10/21/19   Thanh Davidson DO   ARIPiprazole (ABILIFY) 5 mg tablet TAKE 1 TABLET EVERY MORNING (CORRECTION BY  SCRIPT) 3/16/21   Historical Provider, MD   bisacodyl (DULCOLAX) 10 mg suppository Insert 1 suppository (10 mg total) into the rectum daily as needed (3rd line constipation) 4/10/21   Oren Jose MD   cholecalciferol (VITAMIN D3) 1,000 units tablet Take 5,000 Units by mouth daily    Historical Provider, MD   Erenumab-aooe (AIMOVIG) 140 MG/ML SOAJ Inject 140 mg under the skin every 30 (thirty) days  3/22/19   Historical Provider, MD   ferrous sulfate 325 (65 Fe) mg tablet Take 325 mg by mouth daily with breakfast    Historical Provider, MD   fluvoxaMINE (LUVOX) 50 mg tablet Take 3 tablets (150 mg total) by mouth daily at bedtime  Patient taking differently: Take 300 mg by mouth daily at bedtime  10/20/19   Thanh Davidson DO   gabapentin (NEURONTIN) 300 mg capsule Take 1 capsule (300 mg total) by mouth 3 (three) times a day 7/9/20   Gila Lennon PA-C   lamoTRIgine (LaMICtal) 200 MG tablet Take 200 mg by mouth daily      Historical Provider, MD   lidocaine (LIDODERM) 5 % Apply 1 patch topically daily Remove & Discard patch within 12 hours or as directed by MD 4/10/21   Romana Vasquez MD   LORazepam (ATIVAN) 1 mg tablet Take 2 tablets (2 mg total) by mouth 2 (two) times a day for 10 days  Patient taking differently: Take 2 mg by mouth 4 (four) times a day One in morning, one tab afternoon two tablet at bedtime 7/9/20   Marylou Palma PA-C   magnesium hydroxide (MILK OF MAGNESIA) 400 mg/5 mL oral suspension Take 30 mL by mouth daily as needed for constipation 4/10/21   Romana Vasquez MD   meclizine (ANTIVERT) 12 5 MG tablet Take 1 tablet (12 5 mg total) by mouth every 8 (eight) hours as needed for dizziness for up to 7 days 7/1/20   Momo Guadarrama MD   methocarbamol (ROBAXIN) 500 mg tablet Take 1 tablet (500 mg total) by mouth every 6 (six) hours for 7 days 4/10/21 4/17/21  Romana Vasquez MD   ondansetron (ZOFRAN) 4 mg tablet Take 1 tablet (4 mg total) by mouth every 8 (eight) hours as needed for nausea or vomiting 10/7/19   Windy Pain, DO   pantoprazole (PROTONIX) 40 mg tablet Take 1 tablet (40 mg total) by mouth daily in the early morning 4/11/21   Romana Vasquez MD   potassium chloride (K-DUR,KLOR-CON) 20 mEq tablet Take 40 mEq by mouth daily    Historical Provider, MD   senna-docusate sodium (SENOKOT S) 8 6-50 mg per tablet Take 1 tablet by mouth daily at bedtime 4/10/21   Romana Vasquez MD     I have reviewed home medications with patient personally  Allergies:    Allergies   Allergen Reactions    Ketorolac Itching and Other (See Comments)     [toradol] Itching, hives    Mushroom Extract Complex - Food Allergy Hives       Social History:     Marital Status: /Civil Union   Occupation:  Unknown  Patient Pre-hospital Living Situation:  With spouse  Patient Pre-hospital Level of Mobility:  Ambulatory  Patient Pre-hospital Diet Restrictions:  None  Substance Use History:   Social History     Substance and Sexual Activity   Alcohol Use Yes  Alcohol/week: 2 0 standard drinks    Types: 1 Glasses of wine, 1 Standard drinks or equivalent per week    Comment: occasionally     Social History     Tobacco Use   Smoking Status Former Smoker    Packs/day: 0 20    Years: 20 00    Pack years: 4 00    Types: Cigarettes    Start date:     Quit date:     Years since quittin 5   Smokeless Tobacco Never Used   Tobacco Comment    quit     Social History     Substance and Sexual Activity   Drug Use Never    Comment: na       Family History:    Family History   Problem Relation Age of Onset   Li Sicks Breast cancer Mother     Migraines Mother     Arthritis Mother     Kidney disease Father     Heart disease Father     Diabetes Father     Arthritis Father     Brain cancer Brother     Seizures Brother        Physical Exam:     Vitals:   Blood Pressure: 169/70 (21)  Pulse: 76 (21)  Temperature: 98 2 °F (36 8 °C) (21)  Temp Source: Oral (21)  Respirations: 18 (21)  Weight - Scale: 106 kg (233 lb 11 oz) (21)  SpO2: 97 % (21)    Physical Exam  Vitals and nursing note reviewed  Constitutional:       General: She is not in acute distress  Appearance: She is well-developed  She is not ill-appearing, toxic-appearing or diaphoretic  HENT:      Head: Normocephalic and atraumatic  Eyes:      General: No scleral icterus  Conjunctiva/sclera: Conjunctivae normal    Cardiovascular:      Rate and Rhythm: Normal rate and regular rhythm  Heart sounds: Normal heart sounds  No murmur heard  No friction rub  No gallop  Pulmonary:      Effort: Pulmonary effort is normal  No respiratory distress  Breath sounds: Normal breath sounds  No stridor  No wheezing, rhonchi or rales  Chest:      Chest wall: No tenderness  Abdominal:      General: Abdomen is flat  Bowel sounds are normal  There is no distension  Palpations: Abdomen is soft  Tenderness:  There is no abdominal tenderness  Musculoskeletal:         General: Tenderness present  Cervical back: Neck supple  Right lower leg: No edema  Left lower leg: No edema  Comments: Tenderness to palpation over left shoulder   Skin:     General: Skin is warm and dry  Capillary Refill: Capillary refill takes less than 2 seconds  Coloration: Skin is not jaundiced or pale  Findings: No erythema  Neurological:      General: No focal deficit present  Mental Status: She is alert and oriented to person, place, and time  Mental status is at baseline  Cranial Nerves: No cranial nerve deficit  Additional Data:     Lab Results: I have personally reviewed pertinent reports  and I have personally reviewed pertinent films in PACS    Results from last 7 days   Lab Units 07/01/21  1707   WBC Thousand/uL 9 34   HEMOGLOBIN g/dL 11 5   HEMATOCRIT % 35 4   PLATELETS Thousands/uL 420*   NEUTROS PCT % 64   LYMPHS PCT % 23   MONOS PCT % 10   EOS PCT % 3     Results from last 7 days   Lab Units 07/01/21  1707   SODIUM mmol/L 144   POTASSIUM mmol/L 1 8*   CHLORIDE mmol/L 100   CO2 mmol/L 42*   BUN mg/dL 12   CREATININE mg/dL 0 91   ANION GAP mmol/L 2*   CALCIUM mg/dL 8 8   ALBUMIN g/dL 2 7*   TOTAL BILIRUBIN mg/dL 0 20   ALK PHOS U/L 147*   ALT U/L 19   AST U/L 23   GLUCOSE RANDOM mg/dL 89     Results from last 7 days   Lab Units 07/01/21  1707   INR  1 22*                   Imaging: I have personally reviewed pertinent reports  and I have personally reviewed pertinent films in PACS    CT head without contrast   Final Result by Diana Gamboa MD (07/01 1834)      No acute intracranial abnormality  Workstation performed: [id number]         CT spine cervical without contrast   Final Result by Diana Gamboa MD (07/01 1830)      No cervical spine fracture or traumatic malalignment                     Workstation performed: [id number]         XR chest 2 views   ED Interpretation by Gildardo Reynoso SIMON Amezcua PA-C (07/01 1746)   No acute pathology noted      XR shoulder 2+ views LEFT   Final Result by Jono Zamora MD (07/01 1728)      Left shoulder arthroplasty in anatomic alignment  No acute fracture or dislocation  Workstation performed: [id number]             EKG, Pathology, and Other Studies Reviewed on Admission:   · EKG:  Normal sinus rhythm, no acute ischemic changes  No significant change from previous ECG  Allscripts / Epic Records Reviewed: Yes     ** Please Note: This note has been constructed using a voice recognition system   **

## 2021-07-02 NOTE — UTILIZATION REVIEW
Inpatient Admission Authorization Request   NOTIFICATION OF INPATIENT ADMISSION/INPATIENT AUTHORIZATION REQUEST   SERVICING FACILITY:   [address] [place] [address], [facility], [facility]  Tax ID: [id number]  NPI: [id number]  [facility] [address] [facility]  [address]  Place of Service Code: 24     ATTENDING PROVIDER:  Attending Name and NPI#: Peterson Schroeder Md [[id number]]  Address: [address] [place] [address], [facility], [facility]  Phone: [phone]     UTILIZATION REVIEW CONTACT:  Pavan Carmona, Utilization   Network Utilization Review Department  Phone: [phone]  Fax: [phone]  Email: [email or web address]     PHYSICIAN ADVISORY SERVICES:  FOR LUEY-FG-CGWX REVIEW - MEDICAL NECESSITY DENIAL  Phone: [phone]  Fax: [phone]  Email: [email or web address]     TYPE OF REQUEST:  Inpatient Status     ADMISSION INFORMATION:  ADMISSION DATE/TIME: 7/1/21  8:23 PM  PATIENT DIAGNOSIS CODE/DESCRIPTION:  Hypokalemia [E87 6]  Lightheadedness [R42]  Shoulder injury [S49 90XA]  Chest pain [R07 9]  Acute pain of left shoulder [M25 512]  DISCHARGE DATE/TIME: No discharge date for patient encounter  DISCHARGE DISPOSITION (IF DISCHARGED): Home with Home Health Care     IMPORTANT INFORMATION:  Please contact the Pavan Carmona directly with any questions or concerns regarding this request  Department voicemails are confidential     Send requests for admission clinical reviews, concurrent reviews, approvals, and administrative denials due to lack of clinical to fax [phone]

## 2021-07-02 NOTE — UTILIZATION REVIEW
Initial Clinical Review    Admission: Date/Time/Statement:   Admission Orders (From admission, onward)     Ordered        07/01/21 2023  INPATIENT ADMISSION  Once                   Orders Placed This Encounter   Procedures    INPATIENT ADMISSION     Standing Status:   Standing     Number of Occurrences:   1     Order Specific Question:   Level of Care     Answer:   Med Surg [16]     Order Specific Question:   Estimated length of stay     Answer:   More than 2 Midnights     Order Specific Question:   Certification     Answer:   I certify that inpatient services are medically necessary for this patient for a duration of greater than two midnights  See H&P and MD Progress Notes for additional information about the patient's course of treatment  ED Arrival Information     Expected Arrival Acuity    - 7/1/2021 15:48 Urgent         Means of arrival Escorted by Service Admission type    Walk-In Self Hospitalist Urgent         Arrival complaint    shoulder pain        Chief Complaint   Patient presents with    Shoulder Injury     Patient reports had left shoulder replacement on 3/30/21  Carlos Rendon today onto shoulder  good pulse, good cap refill, good strength in had  limited movement  sent by called orthopedics and sent to ED for xray to r/o dislocation  Initial Presentation:     56year old female presents to ed from home for evaluation and treatment of left shoulder pain s/p fall  PMHX:  HTN, MIGRAINE, DVT /PE ON ELIQUIS  L SHOULDER REPLACEMENT, HYPOKALEMIA, SAMUEL'S TREATED AT [facility]  Clinical assessment significant for left shoulder pain worse on motion and tingling of her left arm  Chest discomfort and lightheadedness on standing up   Hypertension, potassium 1 8, Ekg with non specific T wave abnormality and prolonged QT  Treated in ed with iv kcl 20 meq x 2, iv ns bolus, iv ns infusion 125/hr, iv mso4 x1    Admit to inpatient medical surgical with for hypokalemia likely from chronic diarrhea Vs aldosteronism and left arm pain  Plan includes: right hand x ray due to c/o pain, replete potassium and monitor bmp, telemetry and consult nephrology  Date:  7-2-21 Day 2:     Potassium improved to 2 3   Obtain aldosterone/renin level  Trend BMP  Continue telemetry  Continue iv kcl q2 hr and iv ns 75/hr  Today patient states her mental status has cleared  Yesterday she felt she was in a "fog "  New order for Imodium tid prn, Lidoderm patch at   ED Triage Vitals   07/01/21 1554 07/01/21 1554 07/01/21 1554 07/01/21 1554 07/01/21 1554   98 2 °F (36 8 °C) 84 16 (!) 233/107 98 %      Oral Monitor         Pain Score       9          07/01/21 106 kg (233 lb 11 oz)     Additional Vital Signs:       Date/Time  Temp  Pulse  Resp  BP  SpO2  O2 Device   07/02/21 1100  97 3 °F (36 3 °C)  Abnormal   74  18  121/59  90 %  None (Room air)   07/02/21 0900  --  --  --  --  --  None (Room air)   07/02/21 0700  96 3 °F (35 7 °C)  Abnormal   78  18  143/68  95 %  None (Room air)   07/02/21 0257  97 7 °F (36 5 °C)  76  20  150/90  96 %  None (Room air)   07/01/21 2345  --  --  --  --  --  None (Room air)   07/01/21 2236  --  --  --  172/82  Abnormal   --  --   07/01/21 2232  --  80  18  172/92  Abnormal   98 %  None (Room air)   07/01/21 2152  --  81  18  141/63  97 %  None (Room air)   07/01/21 1945  --  76  18  169/70  97 %  None (Room air)   07/01/21 1759  --  77  16  161/74  98 %  None (Room air)   07/01/21 1756  --  75  16  176/81  Abnormal   98 %  None (Room air)   07/01/21 1754  --  74  16  135/63  98 %  None (Room air)   07/01/21 1626  --  --  --  173/80  Abnormal   --  --             Pertinent Labs/Diagnostic Test Results:   Collection Time Result Time Vent R Atrial R MS Int  QRSD Int  QT Int  QTC Int   P Axis QRS Axis T Wave Ax    07/01/21 20:05:00 07/01/21 23:17:50 77 77 172 98 412 466 37 -12 -23                     Normal sinus rhythm   Voltage criteria for left ventricular hypertrophy   Nonspecific T wave abnormality   Prolonged QT   Abnormal ECG   When compared with ECG of 01-JUL-2021 16:31,   No significant change was found                   Collection Time Result Time Vent R Atrial R GA Int  QRSD Int  QT Int  QTC Int  P Axis QRS Axis T Wave Ax    07/01/21 16:31:10 07/01/21 17:07:16 78 78 160 96 396 451 27 -9 -23                     Normal sinus rhythm   Voltage criteria for left ventricular hypertrophy   Cannot rule out Septal infarct , age undetermined   Abnormal ECG   When compared with ECG of 08-AUG-2020 13:24,   No significant change was found                     XR hand 3+ vw right   Final    (07/02 1028)      No acute osseous abnormality  Degenerative changes as described  CT head without contrast   Final   (07/01 1834)      No acute intracranial abnormality  CT spine cervical without contrast   Final  (07/01 1830)      No cervical spine fracture or traumatic malalignment  XR chest 2 views      Final  (07/02 7171)      No radiographic evidence of acute intrathoracic process  XR shoulder 2+ views LEFT   Final  (07/01 1728)      Left shoulder arthroplasty in anatomic alignment  No acute fracture or dislocation                  Results from last 7 days   Lab Units 07/02/21  0524 07/01/21  1707   WBC Thousand/uL 9 55 9 34   HEMOGLOBIN g/dL 10 4* 11 5   HEMATOCRIT % 32 6* 35 4   PLATELETS Thousands/uL 407* 420*   NEUTROS ABS Thousands/µL 5 85 5 91         Results from last 7 days   Lab Units 07/02/21  0626 07/01/21  2352 07/01/21  1707   SODIUM mmol/L 145  --  144   POTASSIUM mmol/L 2 3* 2 4* 1 8*   CHLORIDE mmol/L 102  --  100   CO2 mmol/L 37*  --  42*   ANION GAP mmol/L 6  --  2*   BUN mg/dL 10  --  12   CREATININE mg/dL 0 79  --  0 91   EGFR ml/min/1 73sq m 80  --  68   CALCIUM mg/dL 8 4  --  8 8   MAGNESIUM mg/dL  --   --  2 1   PHOSPHORUS mg/dL  --   --  3 0     Results from last 7 days   Lab Units 07/01/21  1707   AST U/L 23   ALT U/L 19   ALK PHOS U/L 147*   TOTAL PROTEIN g/dL 6 6   ALBUMIN g/dL 2 7*   TOTAL BILIRUBIN mg/dL 0 20         Results from last 7 days   Lab Units 07/02/21  0626 07/01/21  1707   GLUCOSE RANDOM mg/dL 92 89       Results from last 7 days   Lab Units 07/01/21  2015 07/01/21  1707   TROPONIN I ng/mL <0 02 <0 02         Results from last 7 days   Lab Units 07/01/21  1707   PROTIME seconds 15 1*   INR  1 22*   PTT seconds 34     Results from last 7 days   Lab Units 07/01/21  1707   TSH 3RD GENERATON uIU/mL 0 999       Results from last 7 days   Lab Units 07/01/21  1707   LIPASE u/L 45*       Results from last 7 days   Lab Units 07/01/21  1753   CLARITY UA  Clear   COLOR UA  Yellow   SPEC GRAV UA  1 010   PH UA  7 0   GLUCOSE UA mg/dl Negative   KETONES UA mg/dl Negative   BLOOD UA  Negative   PROTEIN UA mg/dl Negative   NITRITE UA  Negative   BILIRUBIN UA  Negative   UROBILINOGEN UA E U /dl 0 2   LEUKOCYTES UA  Trace*   WBC UA /hpf 0-1*   RBC UA /hpf None Seen   BACTERIA UA /hpf Occasional   EPITHELIAL CELLS WET PREP /hpf Occasional       ED Treatment:   Medication Administration from 07/01/2021 1548 to 07/01/2021 2214       Date/Time Order Dose Route Action     07/01/2021 1707 sodium chloride 0 9 % bolus 1,000 mL 1,000 mL Intravenous New Bag     07/01/2021 1708 morphine injection 2 mg 2 mg Intravenous Given     07/01/2021 2101 potassium chloride 20 mEq IVPB (premix) 20 mEq Intravenous New Bag     07/01/2021 1858 potassium chloride 20 mEq IVPB (premix) 20 mEq Intravenous New Bag     07/01/2021 1944 fentanyl citrate (PF) 100 MCG/2ML 25 mcg 25 mcg Intravenous Given     07/01/2021 1900 sodium chloride 0 9 % infusion 125 mL/hr Intravenous New Bag        Past Medical History:   Diagnosis Date    Adrenal insufficiency ([place]'s disease) ([facility] [address] )     Bipolar disorder     C  difficile diarrhea     Cervical radiculopathy     Chronic back pain     Chronic pain disorder     lumbar    DVT, lower extremity ([facility] [address] ) 1991    Fibromyalgia     History of TIAs cannot remember details    Hypertension     Hypokalemia     Migraine     MRSA (methicillin resistant Staphylococcus aureus) 07/20/2012    nasal swab negative 4/5/19    Psychiatric disorder     Anxiety, major depression, bipolar    Spinal stenosis     Stroke ([facility] [address]     tia    Syncope 2014    orthostatic hypotension    Wears dentures     upper     Present on Admission:   Hypokalemia   Shoulder pain   History of pulmonary embolism   Bipolar disorder      Admitting Diagnosis:   Hypokalemia [E87 6]  Lightheadedness [R42]  Shoulder injury [S49 90XA]  Chest pain [R07 9]  Acute pain of left shoulder [M25 512]    Age/Sex: 56 y o  female         Scheduled Medications:    Medication Administration - last 24 hours from 07/01/2021 1132 to 07/02/2021 1132       Date/Time Order Dose Route Action     07/01/2021 2101 potassium chloride 20 mEq IVPB (premix) 20 mEq Intravenous New Bag     07/01/2021 1858 potassium chloride 20 mEq IVPB (premix) 20 mEq Intravenous New Bag     07/02/2021 0553 potassium chloride 20 mEq IVPB (premix) 20 mEq Intravenous New Bag     07/02/2021 0117 potassium chloride (K-DUR,KLOR-CON) CR tablet 40 mEq 40 mEq Oral Given     07/02/2021 0131 potassium chloride 20 mEq IVPB (premix) 20 mEq Intravenous New Bag     07/02/2021 1030 potassium chloride 20 mEq IVPB (premix) 20 mEq Intravenous New Bag     07/02/2021 0821 potassium chloride 20 mEq IVPB (premix) 20 mEq Intravenous New Bag        apixaban, 5 mg, Oral, BID  ARIPiprazole, 5 mg, Oral, Daily  docusate sodium, 100 mg, Oral, BID  fluvoxaMINE, 300 mg, Oral, HS  gabapentin, 300 mg, Oral, TID  hydrochlorothiazide, 12 5 mg, Oral, Daily  lamoTRIgine, 200 mg, Oral, Daily  lidocaine, 1 patch, Topical, HS  LORazepam, 2 mg, Oral, HS  LORazepam, 2 mg, Oral, BID (AM & Afternoon)  pantoprazole, 40 mg, Oral, Early Morning  potassium chloride, 20 mEq, Intravenous, Q2H      Continuous IV Infusions:  sodium chloride, 75 mL/hr, Intravenous, Continuous      PRN Meds:  acetaminophen, 650 mg, Oral, Q6H PRN  aluminum-magnesium hydroxide-simethicone, 30 mL, Oral, Q6H PRN  loperamide, 2 mg, Oral, TID PRN  meclizine, 12 5 mg, Oral, Q8H PRN  ondansetron, 4 mg, Intravenous, Q6H PRN  oxyCODONE, 5 mg, Oral, Q4H PRN        IP CONSULT TO NEPHROLOGY    Network Utilization Review Department  ATTENTION: Please call with any questions or concerns to [phone] and carefully listen to the prompts so that you are directed to the right person  All voicemails are confidential   Yuliya Kaur all requests for admission clinical reviews, approved or denied determinations and any other requests to dedicated fax number below belonging to the campus where the patient is receiving treatment   List of dedicated fax numbers for the Facilities:  [address] DENIALS (Administrative/Medical Necessity) [phone]   [address] (Maternity/NICU/Pediatrics) [phone]   [address] [place] [address] [address] [address] [place] [address] [address] [facility] [phone]

## 2021-07-02 NOTE — CONSULTS
Consultation - Nephrology   Getachew Schumacher 56 y o  female MRN: [id number]  Unit/Bed#: E2 -39 Encounter: [id number]      ASSESSMENT & PLAN    1  Severe hypokalemia  o The etiology of this is unclear she has a history of Andrés's disease required hydrocortisone therapy in the past  o She has had lower potassium levels but was on hydrochlorothiazide and also having diarrhea  o Her blood pressures have been high, not low  o A renin and aldosterone level are pending  o Will check a urinary potassium, urinary creatinine  o Given history hold off on mineralocorticoid receptor antagonist  o Will continue potassium repletion-will give 40 mEq of potassium chloride 2-3 times daily plus 20 mEq of potassium chloride IV 2 times daily for 2 doses-repeat BMP now and then every 8 hours with a goal potassium between 3-5 if potassium is increasing discontinue potassium repletion  o Cautious potassium repletion given history  o Will hold further IV fluids as blood pressures are elevated not low patient is tolerating p o  Intake well and IV hydration can lead to potassium wasting    2  Elevated bicarbonate  o Elevated bicarbonate at 37 this could be in the setting of volume depletion, repeat BMP now also check a VBG    3  Hypertension  o Blood pressures are elevated upon presentation the seems to have improved  o Discontinue hydrochlorothiazide as this can lead to potassium wasting as well  o Will monitor    4  Diarrhea  o Has had 4-5 bowel movements this is a chronic issue for the patient ongoing workup  o Imodium  o 30% of potassium excretion is through the gut and also could be contributing to patient's hypokalemia    5  History of diagnosed [place]'s disease  o Blood pressures are stable, potassiums are low, not high  o Will check a cortisol in the a m   o Consider endocrinology consult as well    6   Clinical Course/CV Risk Reduction/Health maintenance/communication  o Recent shoulder surgery    DISCUSSION    As above regarding initial workup and treatment  Replete potassium  Check BMPs regularly to not overcorrect given history of Andrés's disease      HISTORY OF PRESENT ILLNESS:  Requesting Physician: Daria Crews MD  Reason for Consult:  Hypokalemia    Kavitha Moffett is a 56y o  year old female who was admitted for intractable migraine history of adrenal insufficiency hypertension hyperlipidemia history of previous hypokalemia, DVT and PE, on Eliquis had a left shoulder replacement in March presents with a fall and left shoulder pain was subsequently found to be hypokalemic she states she fell because she tripped and landed on her shoulder did not hit her head she has no headaches dizziness no vision changes nausea vomiting besides a significant pain in her shoulder she has a history of chronic diarrhea and has up to 5 loose stools daily, she has had low potassium level in the past but was diagnosed with adrenal insufficiency she was on hydrochlorothiazide for hypertension as well    PAST MEDICAL HISTORY:  Past Medical History:   Diagnosis Date    Adrenal insufficiency ([place]'s disease) ([facility] [address] )     Bipolar disorder     C  difficile diarrhea     Cervical radiculopathy     Chronic back pain     Chronic pain disorder     lumbar    DVT, lower extremity ([address] ) 1991    Fibromyalgia     History of TIAs     cannot remember details    Hypertension     Hypokalemia     Migraine     MRSA (methicillin resistant Staphylococcus aureus) 07/20/2012    nasal swab negative 4/5/19    Psychiatric disorder     Anxiety, major depression, bipolar    Spinal stenosis     Stroke ([address] )     tia    Syncope 2014    orthostatic hypotension    Wears dentures     upper       PAST SURGICAL HISTORY:  Past Surgical History:   Procedure Laterality Date    APPENDECTOMY      CAST APPLICATION Left 2/9/5012    Procedure: Application short-arm splint;  Surgeon: Chance Kern MD;  [facility];  Service: Orthopedics    COLONOSCOPY  GASTRIC RESTRICTION SURGERY      Gastric Sleeve 2015    HYSTERECTOMY      DONALD    JOINT REPLACEMENT      Left Knee  and Right Hip     ORIF WRIST FRACTURE Left 2020    Procedure: Open reduction and internal fixation left radius and ulnar shaft fracture;  Surgeon: Mely Miramontes MD;  [facility];  Service: Orthopedics    DE RECONSTR TOTAL SHOULDER IMPLANT Left 3/30/2021    Procedure: ARTHROPLASTY SHOULDER - anatomic;  Surgeon: Laisha Peterson MD;  [facility];  Service: Orthopedics    WISDOM TOOTH EXTRACTION         ALLERGIES:  Allergies   Allergen Reactions    Ketorolac Itching and Other (See Comments)     [toradol] Itching, hives    Mushroom Extract Complex - Food Allergy Hives       SOCIAL HISTORY:  Social History     Substance and Sexual Activity   Alcohol Use Yes    Alcohol/week: 2 0 standard drinks    Types: 1 Glasses of wine, 1 Standard drinks or equivalent per week    Comment: occasionally     Social History     Substance and Sexual Activity   Drug Use Never    Comment: na     Social History     Tobacco Use   Smoking Status Former Smoker    Packs/day: 0 20    Years: 20 00    Pack years: 4 00    Types: Cigarettes    Start date:     Quit date:     Years since quittin 5   Smokeless Tobacco Never Used   Tobacco Comment    quit       FAMILY HISTORY:  Family History   Problem Relation Age of Onset    Breast cancer Mother     Migraines Mother     Arthritis Mother     Kidney disease Father     Heart disease Father     Diabetes Father     Arthritis Father     Brain cancer Brother     Seizures Brother        MEDICATIONS:    Current Facility-Administered Medications:     acetaminophen (TYLENOL) tablet 650 mg, 650 mg, Oral, Q6H PRN, Ankita Nielson PA-C, 650 mg at 21 0121    aluminum-magnesium hydroxide-simethicone (MYLANTA) oral suspension 30 mL, 30 mL, Oral, Q6H PRN, Ankita Nielson PA-C    apixaban (ELIQUIS) tablet 5 mg, 5 mg, Oral, BID, [place], PA-C, 5 mg at 07/02/21 0831    ARIPiprazole (ABILIFY) tablet 5 mg, 5 mg, Oral, Daily, Karan Clubs, PA-C, 5 mg at 07/02/21 0831    docusate sodium (COLACE) capsule 100 mg, 100 mg, Oral, BID, Karan Clubs, PA-C    fluvoxaMINE (LUVOX) tablet 300 mg, 300 mg, Oral, HS, [place], PA-C, 300 mg at 07/02/21 0016    gabapentin (NEURONTIN) capsule 300 mg, 300 mg, Oral, TID, Karan Clubs, PA-C, 300 mg at 07/02/21 0831    lamoTRIgine (LaMICtal) tablet 200 mg, 200 mg, Oral, Daily, Karan Clubs, PA-C, 200 mg at 07/02/21 6338    lidocaine (LIDODERM) 5 % patch 1 patch, 1 patch, Topical, HS, Shaniqua Duran MD, 1 patch at 07/02/21 0016    loperamide (IMODIUM) capsule 2 mg, 2 mg, Oral, TID PRN, [place], PA-C, 2 mg at 07/02/21 1034    LORazepam (ATIVAN) tablet 2 mg, 2 mg, Oral, HS, Karan Clubs, PA-C, 2 mg at 07/01/21 2331    LORazepam (ATIVAN) tablet 2 mg, 2 mg, Oral, BID (AM & Afternoon), [place], PA-C    meclizine (ANTIVERT) tablet 12 5 mg, 12 5 mg, Oral, Q8H PRN, [place], PA-C    ondansetron [facility] injection 4 mg, 4 mg, Intravenous, Q6H PRN, [place]s, PA-C, 4 mg at 07/02/21 0825    oxyCODONE (ROXICODONE) IR tablet 5 mg, 5 mg, Oral, Q4H PRN, Shaniqua Duran MD, 5 mg at 07/02/21 0335    pantoprazole (PROTONIX) EC tablet 40 mg, 40 mg, Oral, Early Morning, Karan Clubs, PA-C, 40 mg at 07/02/21 0548    potassium chloride 20 mEq IVPB (premix), 20 mEq, Intravenous, Q2H, [place], PA-C, Last Rate: 50 mL/hr at 07/02/21 1030, 20 mEq at 07/02/21 1030    sodium chloride 0 9 % infusion, 75 mL/hr, Intravenous, Continuous, Karan Clubs, PA-C, Last Rate: 75 mL/hr at 07/02/21 1031, 75 mL/hr at 07/02/21 1031    REVIEW OF SYSTEMS:  All the systems were reviewed and were negative except as documented on the HPI        PHYSICAL EXAM:  Current Weight: Weight - Scale: 106 kg (233 lb 11 oz)  First Weight: Weight - Scale: 106 kg (233 lb 11 oz)  Vitals:    07/01/21 2236 07/02/21 0257 07/02/21 0700 07/02/21 1100   BP: Kandis Cervantes ) 172/82 150/90 143/68 121/59   BP Location: Left arm  Left arm Left arm   Pulse:  76 78 74   Resp:  20 18 18   Temp:  97 7 °F (36 5 °C) (!) 96 3 °F (35 7 °C) (!) 97 3 °F (36 3 °C)   TempSrc:  Temporal Temporal Temporal   SpO2:  96% 95% 90%   Weight:       Height:           Intake/Output Summary (Last 24 hours) at 7/2/2021 1144  Last data filed at 7/2/2021 1001  Gross per 24 hour   Intake 1100 ml   Output 3 ml   Net 1097 ml       General: conscious, cooperative, in no acute distress  Eyes: conjunctivae pink, anicteric sclerae  ENT: lips and mucous membranes moist  Neck: supple, no JVD  Chest:  No respiratory distress, no accessory muscle use normal respiratory effort  CVS:  Normal rate with no pericardial friction rub  Abdomen: soft, non-tender, non-distended, normoactive bowel sounds  Extremities: no edema of both legs  Skin: no rash  Neuro: awake, alert, oriented  Psych:  Pleasant affect      Invasive Devices:      Lab Results:   Results from last 7 days   Lab Units 07/02/21  0626 07/02/21  0524 07/01/21  2352 07/01/21  1753 07/01/21  1707   WBC Thousand/uL  --  9 55  --   --  9 34   HEMOGLOBIN g/dL  --  10 4*  --   --  11 5   HEMATOCRIT %  --  32 6*  --   --  35 4   PLATELETS Thousands/uL  --  407*  --   --  420*   POTASSIUM mmol/L 2 3*  --  2 4*  --  1 8*   CHLORIDE mmol/L 102  --   --   --  100   CO2 mmol/L 37*  --   --   --  42*   BUN mg/dL 10  --   --   --  12   CREATININE mg/dL 0 79  --   --   --  0 91   CALCIUM mg/dL 8 4  --   --   --  8 8   MAGNESIUM mg/dL  --   --   --   --  2 1   PHOSPHORUS mg/dL  --   --   --   --  3 0   ALK PHOS U/L  --   --   --   --  147*   ALT U/L  --   --   --   --  19   AST U/L  --   --   --   --  23   NITRITE UA   --   --   --  Negative  --    BLOOD UA   --   --   --  Negative  --    LEUKOCYTES UA   --   --   --  Trace*  --          Portions of the record may have been created with voice recognition software   Occasional wrong word or "sound a like" substitutions may have occurred due to the inherent limitations of voice recognition software  Read the chart carefully and recognize, using context, where substitutions have occurred  If you have any questions, please contact the dictating provider

## 2021-07-02 NOTE — ASSESSMENT & PLAN NOTE
With a history of hypokalemia, previously hospitalized in March of 2020 at [facility] for hypokalemia of 2 8  Patient with history of [place]'s disease, not currently on any steroid therapy, follows with [facility] endocrinology last evaluated in December of 2020  · Hypokalemia of 1 8 on admission, most recent 2 3  · Will continue to replete  · Suspect secondary to GI losses as the patient has chronic diarrhea and goes approximately 5 times a day however will workup for additional causes of hypokalemia  Given patient's hypertension hypokalemia, will continue workup for primary aldosteronism  · Aldosterone/renin level pending  · Urine potassium-2 1  · BMP in a m    · Telemetry  · Mag 2 1  · Without hypotension, no concern for adrenal crisis at this time  · Nephrology consulted, appreciate recommendations

## 2021-07-02 NOTE — UTILIZATION REVIEW
Inpatient Admission Authorization Request   NOTIFICATION OF INPATIENT ADMISSION/INPATIENT AUTHORIZATION REQUEST   SERVICING FACILITY:   [address] [place] [address], [facility], [facility]  Tax ID: [id number]  NPI: [id number]  Place of Service: [facility]patient [address] [facility]  [address]  Place of Service Code: 24     ATTENDING PROVIDER:  Attending Name and NPI#: Daria Crews Md [[id number]]  Address: [address] [place] [address], [facility], [facility]  Phone: [phone]     UTILIZATION REVIEW CONTACT:  Haily Chirinos Utilization   Network Utilization Review Department  Phone: [phone]  Fax: [phone]  Email: [email or web address]     PHYSICIAN ADVISORY SERVICES:  FOR [id number]-[id number] REVIEW - MEDICAL NECESSITY DENIAL  Phone: [phone]  Fax: [phone]  Email: [email or web address]     TYPE OF REQUEST:  Inpatient Status     ADMISSION INFORMATION:  ADMISSION DATE/TIME: 7/1/21  8:23 PM  PATIENT DIAGNOSIS CODE/DESCRIPTION:  Hypokalemia [E87 6]  Lightheadedness [R42]  Shoulder injury [S49 90XA]  Chest pain [R07 9]  Acute pain of left shoulder [M25 512]  DISCHARGE DATE/TIME: No discharge date for patient encounter  DISCHARGE DISPOSITION (IF DISCHARGED): Home with Home Health Care     IMPORTANT INFORMATION:  Please contact the Haily Chirinos directly with any questions or concerns regarding this request  Department voicemails are confidential     Send requests for admission clinical reviews, concurrent reviews, approvals, and administrative denials due to lack of clinical to fax [phone]

## 2021-07-02 NOTE — ASSESSMENT & PLAN NOTE
· With history of pulmonary embolism  · On chronic anticoagulation with Eliquis 5 mg b i d , will continue at this time

## 2021-07-02 NOTE — PROGRESS NOTES
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, not currently on any steroid therapy, follows with [facility] endocrinology last evaluated in December of 2020  · Hypokalemia of 1 8 on admission, most recent 2 3  · Will continue to replete  · Suspect secondary to GI losses as the patient has chronic diarrhea and goes approximately 5 times a day however will workup for additional causes of hypokalemia  Given patient's hypertension hypokalemia, will continue workup for primary aldosteronism  · Aldosterone/renin level pending  · Urine potassium-2 1  · BMP in a m  · Telemetry  · Mag 2 1  · Without hypotension, no concern for adrenal crisis at this time  · Nephrology consulted, appreciate recommendations    History of pulmonary embolism  Assessment & Plan  · With history of pulmonary embolism  · On chronic anticoagulation with Eliquis 5 mg b i d , will continue at this time    Shoulder pain  Assessment & Plan  Initially presented to the emergency department after a fall in her left shoulder  With recent left shoulder arthroplasty, outpatient orthopedist recommended come to the emergency department to rule out dislocation  · X-ray of left shoulder revealed left shoulder arthroplasty in anatomic alignment  No acute fracture dislocation  · Lidocaine patch ordered  · Ice    Bipolar disorder  Assessment & Plan  · Continue Abilify 5 mg q d    · Luvox 300 mg HS    History of migraine  Assessment & Plan  · High utilizer  · Follows closely with outpatient Neurology  · Review high utilizer plan      VTE Pharmacologic Prophylaxis:   Pharmacologic: Apixaban (Eliquis)  Mechanical VTE Prophylaxis in Place: Yes    Patient Centered Rounds: I have performed bedside rounds with nursing staff today  Discussions with Specialists or Other Care Team Provider:  None    Education and Discussions with Family / Patient:  Discussed plan of care with patient, when asked if I could update  the patient politely declined and stated that she would update them accordingly  Time Spent for Care: 20 minutes  More than 50% of total time spent on counseling and coordination of care as described above  Current Length of Stay: 1 day(s)    Current Patient Status: Inpatient   Certification Statement: The patient will continue to require additional inpatient hospital stay due to Management of hypokalemia    Discharge Plan: To home within 48-72 hours pending clinical improvement    Code Status: Level 1 - Full Code      Subjective: The patient states that she feels significantly improved from when she was 1st admitted yesterday  She states that yesterday she felt like she was in a fog  Today she feels like she is able to think clear and denies any pain  Denies any chest pain, shortness of breath, nausea, vomiting, diarrhea, constipation, increased leg swelling, numbness/tingling, vision changes, headache    Objective:     Vitals:   Temp (24hrs), Av 4 °F (36 3 °C), Min:96 3 °F (35 7 °C), Max:98 2 °F (36 8 °C)    Temp:  [96 3 °F (35 7 °C)-98 2 °F (36 8 °C)] 96 3 °F (35 7 °C)  HR:  [74-84] 78  Resp:  [16-20] 18  BP: (135-233)/() 143/68  SpO2:  [95 %-98 %] 95 %  Body mass index is 35 53 kg/m²  Input and Output Summary (last 24 hours): Intake/Output Summary (Last 24 hours) at 2021 0926  Last data filed at 2021  Gross per 24 hour   Intake 1100 ml   Output --   Net 1100 ml       Physical Exam:     Physical Exam  Vitals and nursing note reviewed     Constitutional:       General: She is not in acute distress  Appearance: Normal appearance  She is well-developed  She is not ill-appearing, toxic-appearing or diaphoretic  HENT:      Head: Normocephalic and atraumatic  Eyes:      General: No scleral icterus  Conjunctiva/sclera: Conjunctivae normal    Cardiovascular:      Rate and Rhythm: Normal rate and regular rhythm  Heart sounds: No murmur heard  No friction rub  No gallop  Pulmonary:      Effort: Pulmonary effort is normal  No respiratory distress  Breath sounds: Normal breath sounds  No stridor  No wheezing, rhonchi or rales  Chest:      Chest wall: No tenderness  Abdominal:      General: Abdomen is flat  Bowel sounds are normal       Palpations: Abdomen is soft  Tenderness: There is no abdominal tenderness  Musculoskeletal:      Cervical back: Neck supple  Right lower leg: No edema  Left lower leg: No edema  Skin:     General: Skin is warm and dry  Capillary Refill: Capillary refill takes less than 2 seconds  Coloration: Skin is not jaundiced or pale  Findings: No erythema  Neurological:      General: No focal deficit present  Mental Status: She is alert and oriented to person, place, and time  Mental status is at baseline           Additional Data:     Labs:    Results from last 7 days   Lab Units 07/02/21  0524   WBC Thousand/uL 9 55   HEMOGLOBIN g/dL 10 4*   HEMATOCRIT % 32 6*   PLATELETS Thousands/uL 407*   NEUTROS PCT % 60   LYMPHS PCT % 25   MONOS PCT % 10   EOS PCT % 3     Results from last 7 days   Lab Units 07/02/21  0626 07/01/21  1707   SODIUM mmol/L 145 144   POTASSIUM mmol/L 2 3* 1 8*   CHLORIDE mmol/L 102 100   CO2 mmol/L 37* 42*   BUN mg/dL 10 12   CREATININE mg/dL 0 79 0 91   ANION GAP mmol/L 6 2*   CALCIUM mg/dL 8 4 8 8   ALBUMIN g/dL  --  2 7*   TOTAL BILIRUBIN mg/dL  --  0 20   ALK PHOS U/L  --  147*   ALT U/L  --  19   AST U/L  --  23   GLUCOSE RANDOM mg/dL 92 89     Results from last 7 days   Lab Units 07/01/21  1707   INR  1 22*                       * I Have Reviewed All Lab Data Listed Above  * Additional Pertinent Lab Tests Reviewed: Aubreyingdl 66 Admission Reviewed    Imaging:    Imaging Reports Reviewed Today Include:  None  Imaging Personally Reviewed by Myself Includes:  None    Recent Cultures (last 7 days):           Last 24 Hours Medication List:   Current Facility-Administered Medications   Medication Dose Route Frequency Provider Last Rate    acetaminophen  650 mg Oral Q6H PRN Elma Peabody, PA-YO      aluminum-magnesium hydroxide-simethicone  30 mL Oral Q6H PRN Elma Peabody, PA-C      apixaban  5 mg Oral BID Elma Peabody, PA-C      ARIPiprazole  5 mg Oral Daily Elma Peabody, [place]      docusate sodium  100 mg Oral BID Elma Peabody, PA-C      fluvoxaMINE  300 mg Oral HS Elma Peabody, [place]      gabapentin  300 mg Oral TID Elma Peabody, PA-C      hydrochlorothiazide  12 5 mg Oral Daily Jayden Zelaya MD      lamoTRIgine  200 mg Oral Daily Elma Peabody, PA-C      lidocaine  1 patch Topical HS Jayden Zelaya MD      LORazepam  2 mg Oral HS Elma Peabody, PA-C      LORazepam  2 mg Oral BID (AM & Afternoon) Elma Peabody, PA-C      meclizine  12 5 mg Oral Q8H PRN Elma Peabody, PA-C      ondansetron  4 mg Intravenous Q6H PRN Elma Peabody, PA-C      oxyCODONE  5 mg Oral Q4H PRN Jayden Zelaya MD      pantoprazole  40 mg Oral Early Morning Elma Peabody, PA-C      potassium chloride  20 mEq Intravenous [facility] mEq (07/02/21 0553)    potassium chloride  20 mEq Intravenous Q2H Elma Peabody, PA-C 20 mEq (07/02/21 8445)        Today, Patient Was Seen By: Elma Peabody, PA-C    ** Please Note: Dictation voice to text software may have been used in the creation of this document   **

## 2021-07-02 NOTE — TELEPHONE ENCOUNTER
Xr - no fx  Not great views  Currently has medical issues being addressed as well    We will see in office in few weeks for better x-rays and good exam

## 2021-07-03 PROBLEM — S60.221A CONTUSION OF RIGHT HAND: Status: ACTIVE | Noted: 2021-07-03

## 2021-07-03 PROBLEM — E87.3 ALKALOSIS: Status: ACTIVE | Noted: 2021-07-03

## 2021-07-03 LAB
ANION GAP SERPL CALCULATED.3IONS-SCNC: 1 MMOL/L (ref 4–13)
ANION GAP SERPL CALCULATED.3IONS-SCNC: 2 MMOL/L (ref 4–13)
ANION GAP SERPL CALCULATED.3IONS-SCNC: 3 MMOL/L (ref 4–13)
BUN SERPL-MCNC: 11 MG/DL (ref 5–25)
BUN SERPL-MCNC: 11 MG/DL (ref 5–25)
BUN SERPL-MCNC: 12 MG/DL (ref 5–25)
CALCIUM SERPL-MCNC: 8.1 MG/DL (ref 8.3–10.1)
CALCIUM SERPL-MCNC: 8.4 MG/DL (ref 8.3–10.1)
CALCIUM SERPL-MCNC: 8.5 MG/DL (ref 8.3–10.1)
CHLORIDE SERPL-SCNC: 101 MMOL/L (ref 100–108)
CHLORIDE SERPL-SCNC: 101 MMOL/L (ref 100–108)
CHLORIDE SERPL-SCNC: 103 MMOL/L (ref 100–108)
CO2 SERPL-SCNC: 37 MMOL/L (ref 21–32)
CO2 SERPL-SCNC: 38 MMOL/L (ref 21–32)
CO2 SERPL-SCNC: 39 MMOL/L (ref 21–32)
CREAT SERPL-MCNC: 0.86 MG/DL (ref 0.6–1.3)
CREAT SERPL-MCNC: 0.95 MG/DL (ref 0.6–1.3)
CREAT SERPL-MCNC: 0.98 MG/DL (ref 0.6–1.3)
ERYTHROCYTE [DISTWIDTH] IN BLOOD BY AUTOMATED COUNT: 15.3 % (ref 11.6–15.1)
GFR SERPL CREATININE-BSD FRML MDRD: 62 ML/MIN/1.73SQ M
GFR SERPL CREATININE-BSD FRML MDRD: 64 ML/MIN/1.73SQ M
GFR SERPL CREATININE-BSD FRML MDRD: 73 ML/MIN/1.73SQ M
GLUCOSE SERPL-MCNC: 118 MG/DL (ref 65–140)
GLUCOSE SERPL-MCNC: 152 MG/DL (ref 65–140)
GLUCOSE SERPL-MCNC: 89 MG/DL (ref 65–140)
HCT VFR BLD AUTO: 34.4 % (ref 34.8–46.1)
HGB BLD-MCNC: 10.8 G/DL (ref 11.5–15.4)
MCH RBC QN AUTO: 29.9 PG (ref 26.8–34.3)
MCHC RBC AUTO-ENTMCNC: 31.4 G/DL (ref 31.4–37.4)
MCV RBC AUTO: 95 FL (ref 82–98)
PLATELET # BLD AUTO: 381 THOUSANDS/UL (ref 149–390)
PMV BLD AUTO: 9.6 FL (ref 8.9–12.7)
POTASSIUM SERPL-SCNC: 3.1 MMOL/L (ref 3.5–5.3)
POTASSIUM SERPL-SCNC: 3.3 MMOL/L (ref 3.5–5.3)
POTASSIUM SERPL-SCNC: 3.6 MMOL/L (ref 3.5–5.3)
POTASSIUM SERPL-SCNC: 4.3 MMOL/L (ref 3.5–5.3)
RBC # BLD AUTO: 3.61 MILLION/UL (ref 3.81–5.12)
SODIUM SERPL-SCNC: 141 MMOL/L (ref 136–145)
SODIUM SERPL-SCNC: 141 MMOL/L (ref 136–145)
SODIUM SERPL-SCNC: 143 MMOL/L (ref 136–145)
WBC # BLD AUTO: 9.11 THOUSAND/UL (ref 4.31–10.16)

## 2021-07-03 PROCEDURE — 80048 BASIC METABOLIC PNL TOTAL CA: CPT | Performed by: PHYSICIAN ASSISTANT

## 2021-07-03 PROCEDURE — 80048 BASIC METABOLIC PNL TOTAL CA: CPT | Performed by: INTERNAL MEDICINE

## 2021-07-03 PROCEDURE — 99232 SBSQ HOSP IP/OBS MODERATE 35: CPT | Performed by: PHYSICIAN ASSISTANT

## 2021-07-03 PROCEDURE — 85027 COMPLETE CBC AUTOMATED: CPT | Performed by: PHYSICIAN ASSISTANT

## 2021-07-03 PROCEDURE — 99233 SBSQ HOSP IP/OBS HIGH 50: CPT | Performed by: INTERNAL MEDICINE

## 2021-07-03 RX ORDER — SUMATRIPTAN 50 MG/1
50 TABLET, FILM COATED ORAL ONCE
Status: COMPLETED | OUTPATIENT
Start: 2021-07-03 | End: 2021-07-03

## 2021-07-03 RX ORDER — POTASSIUM CHLORIDE 14.9 MG/ML
20 INJECTION INTRAVENOUS
Status: COMPLETED | OUTPATIENT
Start: 2021-07-03 | End: 2021-07-04

## 2021-07-03 RX ADMIN — LORAZEPAM 2 MG: 1 TABLET ORAL at 13:33

## 2021-07-03 RX ADMIN — ACETAMINOPHEN 650 MG: 325 TABLET, FILM COATED ORAL at 02:34

## 2021-07-03 RX ADMIN — FLUVOXAMINE MALEATE 300 MG: 50 TABLET ORAL at 21:36

## 2021-07-03 RX ADMIN — ONDANSETRON 4 MG: 2 INJECTION INTRAMUSCULAR; INTRAVENOUS at 16:42

## 2021-07-03 RX ADMIN — SUMATRIPTAN SUCCINATE 50 MG: 50 TABLET ORAL at 15:37

## 2021-07-03 RX ADMIN — POTASSIUM CHLORIDE 20 MEQ: 14.9 INJECTION, SOLUTION INTRAVENOUS at 12:31

## 2021-07-03 RX ADMIN — GABAPENTIN 300 MG: 300 CAPSULE ORAL at 08:41

## 2021-07-03 RX ADMIN — LAMOTRIGINE 200 MG: 100 TABLET ORAL at 08:41

## 2021-07-03 RX ADMIN — ARIPIPRAZOLE 5 MG: 10 TABLET ORAL at 08:39

## 2021-07-03 RX ADMIN — LORAZEPAM 2 MG: 1 TABLET ORAL at 21:36

## 2021-07-03 RX ADMIN — APIXABAN 5 MG: 5 TABLET, FILM COATED ORAL at 08:39

## 2021-07-03 RX ADMIN — LORAZEPAM 2 MG: 1 TABLET ORAL at 08:41

## 2021-07-03 RX ADMIN — GABAPENTIN 300 MG: 300 CAPSULE ORAL at 21:36

## 2021-07-03 RX ADMIN — PANTOPRAZOLE SODIUM 40 MG: 40 TABLET, DELAYED RELEASE ORAL at 05:23

## 2021-07-03 RX ADMIN — LIDOCAINE 1 PATCH: 50 PATCH TOPICAL at 21:37

## 2021-07-03 RX ADMIN — GABAPENTIN 300 MG: 300 CAPSULE ORAL at 15:41

## 2021-07-03 RX ADMIN — POTASSIUM CHLORIDE 20 MEQ: 14.9 INJECTION, SOLUTION INTRAVENOUS at 10:52

## 2021-07-03 RX ADMIN — OXYCODONE HYDROCHLORIDE 5 MG: 5 TABLET ORAL at 10:52

## 2021-07-03 RX ADMIN — OXYCODONE HYDROCHLORIDE 5 MG: 5 TABLET ORAL at 04:20

## 2021-07-03 RX ADMIN — POTASSIUM CHLORIDE 40 MEQ: 1500 TABLET, EXTENDED RELEASE ORAL at 08:41

## 2021-07-03 RX ADMIN — APIXABAN 5 MG: 5 TABLET, FILM COATED ORAL at 17:26

## 2021-07-03 NOTE — ASSESSMENT & PLAN NOTE
· With history of pulmonary embolism  · On chronic anticoagulation with Eliquis 5 mg b i d , will continue at this time Consent: The patient's consent was obtained including but not limited to risks of crusting, scabbing, blistering, scarring, darker or lighter pigmentary change, recurrence, incomplete removal and infection. The patient understands that the procedure is cosmetic in nature and is not covered by insurance. Post-Care Instructions: I reviewed with the patient in detail post-care instructions. Patient is to wear sunprotection, and avoid picking at any of the treated lesions. Pt may apply Vaseline to crusted or scabbing areas. Detail Level: Detailed Render Post-Care Instructions In Note?: no

## 2021-07-03 NOTE — ASSESSMENT & PLAN NOTE
With a history of hypokalemia, previously hospitalized in March of 2020 at [facility] for hypokalemia of 2 8  Patient with history of Hall's disease, not currently on any steroid therapy, follows with [facility] endocrinology last evaluated in December of 2020  · Hypokalemia of 1 8 on admission, most recent 3 1  · Will continue to replete  · Suspect secondary to GI losses as the patient has chronic diarrhea and goes approximately 5 times a day however will workup for additional causes of hypokalemia  Given patient's hypertension hypokalemia, will continue workup for primary aldosteronism  · Aldosterone/renin level pending  · Urine potassium-2 1, repeat 23 8  · BMP in a m    · Mag 2 1  · Without hypotension, no concern for adrenal crisis at this time  · Nephrology consulted, appreciate recommendations

## 2021-07-03 NOTE — PROGRESS NOTES
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 and required hydrocortisone therapy in the past  -Has had chronic diarrhea prior to admission-last diarrhea yesterday a m   -of note 30% of potassium excretion is through the gut could also be contributing to hypokalemia  Assessment/Plan:   Previously on HCTZ-however patient admits today that she has not taking HCTZ in one year   Currently having higher blood pressure readings not lower blood pressure readings   Admission potassium was 1 8   Status post aggressive IV potassium supplements   Potassium increased to 4 3 on 07/02/2021 at 11:46 pm   Current potassium 3 1   Status post oral potassium K-Dur 40 mEq this a m   Will give additional IV 40 mEq x1 this a m     Remains on q 8 hours potassium   Workup:  o Renin and aldosterone-pending  o Urine potassium 07/01/2021-2 1 and repeat urine potassium 07/02/2021 23 8  o Urine creatinine 113 4    Elevated bicarbonate:  Assessment plan:  · Bicarbonate stable at 37  · Suspect component of volume depletion  · VBG 07/02/2021-venous pH 7 373, CO2 62, O2 57 4, HCO3-35 3  · Previously treated with IV fluids-currently off    Blood pressure/hypertension:  Assessment and Plan:   Home medications include:  Previously reported HCTZ however patient reports being off for about a year   Current medications include:  None   BP elevated on arrival   Current blood pressure 143/86 to 135/82   Maximize hemodynamics to maintain MAP >65   Avoid hypotension or fluctuations in blood pressure   Will continue to trend    Diarrhea:  Assessment and Plan:   Reported 4-5 bowel movements-and has been a chronic issue   Status post Imodium   Last bowel movement yesterday morning   Of note 30% of potassium excretion is through the GI got which could be contributing    History of Andrés disease:   Assessment and Plan:  · Blood pressure stable high on arrival currently 130s to 140s  · Potassiums low not high  · Will check a m  Cortisol level  · May need to consider endocrinology consult       Other medical issues:  Recent shoulder surgery   History of pulmonary embolism-chronic anticoagulation with Eliquis 5 mg b i d  Bipolar disorder  History of migraines      SUBJECTIVE:  Patient seen and examined at bedside  Patient denies chest pain, shortness of breath or dizziness reports having a headache       OBJECTIVE:  Current Weight: Weight - Scale: 106 kg (233 lb 11 oz)  Vitals:    07/02/21 1515 07/02/21 1927 07/02/21 2241 07/03/21 0700   BP: 130/71 138/66 135/82 143/86   BP Location: Left arm Left arm  Left arm   Pulse: 74 71 78 86   Resp: 18 18 20 18   Temp: (!) 97 1 °F (36 2 °C) (!) 97 4 °F (36 3 °C) (!) 97 2 °F (36 2 °C) 97 7 °F (36 5 °C)   TempSrc: Temporal Temporal Temporal Temporal   SpO2: 91% 91% 92% 96%   Weight:       Height:           Intake/Output Summary (Last 24 hours) at 7/3/2021 1041  Last data filed at 7/2/2021 1346  Gross per 24 hour   Intake --   Output 200 ml   Net -200 ml     General:  No acute distress,  cooperative,   Skin:  Warm and dry without rash  ENMT:  Mucous membranes moist, sclera anicteric  Neck:  Supple without JVD  Respiratory:  Essentially clear on auscultation without crackles, rhonchi, wheeze  Cardiac:  Regular rate and rhythm without rub  GI:  Soft, nontender, no distention, active bowel sounds  Extremities:  No significant edema noted bilaterally  Neuro:  Alert oriented and awake  Psych:  Appropriate affect    Medications:    Current Facility-Administered Medications:     acetaminophen (TYLENOL) tablet 650 mg, 650 mg, Oral, Q6H PRN, Ferrell Olszewski, PA-C, 650 mg at 07/03/21 0234    apixaban (ELIQUIS) tablet 5 mg, 5 mg, Oral, BID, Ferrell Olszewski, PA-C, 5 mg at 07/03/21 0839    ARIPiprazole (ABILIFY) tablet 5 mg, 5 mg, Oral, Daily, Leslie Jones PA-C, 5 mg at 07/03/21 2724    docusate sodium (COLACE) capsule 100 mg, 100 mg, Oral, BID, Leslie Jones PA-C    fluvoxaMINE (LUVOX) tablet 300 mg, 300 mg, Oral, HS, Leslie Jones, PA-C, 300 mg at 07/02/21 2106    gabapentin (NEURONTIN) capsule 300 mg, 300 mg, Oral, TID, Leslie Jones PA-C, 300 mg at 07/03/21 5353    lamoTRIgine (LaMICtal) tablet 200 mg, 200 mg, Oral, Daily, Leslie Jones PA-C, 200 mg at 07/03/21 0841    lidocaine (LIDODERM) 5 % patch 1 patch, 1 patch, Topical, HS, Duane Diss, MD, 1 patch at 07/02/21 2107    loperamide (IMODIUM) capsule 2 mg, 2 mg, Oral, TID PRN, Leslie Jones PA-C, 2 mg at 07/02/21 1034    LORazepam (ATIVAN) tablet 2 mg, 2 mg, Oral, HS, Leslie Jones PA-C, 2 mg at 07/02/21 2106    LORazepam (ATIVAN) tablet 2 mg, 2 mg, Oral, BID (AM & Afternoon), Leslie Jones PA-C, 2 mg at 07/03/21 0841    meclizine (ANTIVERT) tablet 12 5 mg, 12 5 mg, Oral, Q8H PRN, Leslie Jones PA-C    ondansetron TELEVA [facility]) injection 4 mg, 4 mg, Intravenous, Q6H PRN, Leslie Jones PA-C, 4 mg at 07/02/21 0825    oxyCODONE (ROXICODONE) IR tablet 5 mg, 5 mg, Oral, Q4H PRN, Duane Diss, MD, 5 mg at 07/03/21 0420    pantoprazole (PROTONIX) EC tablet 40 mg, 40 mg, Oral, Early Morning, Leslie Jones PA-C, 40 mg at 07/03/21 0523    potassium chloride (K-DUR,KLOR-CON) CR tablet 40 mEq, 40 mEq, Oral, Daily, Jasson Gibson DO, 40 mEq at 07/03/21 0841    potassium chloride 20 mEq IVPB (premix), 20 mEq, Intravenous, Q2H, MOON Bess    Laboratory Results:  Results from last 7 days   Lab Units 07/03/21  0520 07/02/21  2346 07/02/21  1852 07/02/21  1219 07/02/21  0626 07/02/21  0524 07/01/21  2352 07/01/21  1707   WBC Thousand/uL 9 11  --   --   --   --  9 55  --  9 34   HEMOGLOBIN g/dL 10 8*  --   --   --   --  10 4*  --  11 5   HEMATOCRIT % 34 4*  --   --   --   --  32 6*  --  35 4   PLATELETS Thousands/uL 381  --   --   --   --  407*  --  420*   SODIUM mmol/L 143  --  144  --  145  -- --  144   POTASSIUM mmol/L 3 1* 4 3 3 2* 3 1* 2 3*  --  2 4* 1 8*   CHLORIDE mmol/L 103  --  104  --  102  --   --  100   CO2 mmol/L 37*  --  38*  --  37*  --   --  42*   BUN mg/dL 11  --  11  --  10  --   --  12   CREATININE mg/dL 0 86  --  0 92  --  0 79  --   --  0 91   CALCIUM mg/dL 8 4  --  8 2*  --  8 4  --   --  8 8   MAGNESIUM mg/dL  --   --   --   --   --   --   --  2 1   PHOSPHORUS mg/dL  --   --   --   --   --   --   --  3 0

## 2021-07-03 NOTE — PLAN OF CARE
Problem: Potential for Falls  Goal: Patient will remain free of falls  Description: INTERVENTIONS:  - Educate patient/family on patient safety including physical limitations  - Instruct patient to call for assistance with activity   - Consult OT/PT to assist with strengthening/mobility   - Keep Call bell within reach  - Keep bed low and locked with side rails adjusted as appropriate  - Keep care items and personal belongings within reach  - Initiate and maintain comfort rounds  - Make Fall Risk Sign visible to staff  - Offer Toileting every  as needed Hours, in advance of need  - Initiate/Maintain  bed arm  - Obtain necessary fall risk management equipment: yellow socks  - Apply yellow socks and bracelet for high fall risk patients  - Consider moving patient to room near nurses station  Outcome: Progressing     Problem: METABOLIC, FLUID AND ELECTROLYTES - ADULT  Goal: Electrolytes maintained within normal limits  Description: INTERVENTIONS:  - Monitor labs and assess patient for signs and symptoms of electrolyte imbalances  - Administer electrolyte replacement as ordered  - Monitor response to electrolyte replacements, including repeat lab results as appropriate  - Instruct patient on fluid and nutrition as appropriate  Outcome: Progressing     Problem: PAIN - ADULT  Goal: Verbalizes/displays adequate comfort level or baseline comfort level  Description: Interventions:  - Encourage patient to monitor pain and request assistance  - Assess pain using appropriate pain scale  - Administer analgesics based on type and severity of pain and evaluate response  - Implement non-pharmacological measures as appropriate and evaluate response  - Consider cultural and social influences on pain and pain management  - Notify physician/advanced practitioner if interventions unsuccessful or patient reports new pain  Outcome: Progressing     Problem: SAFETY ADULT  Goal: Patient will remain free of falls  Description: INTERVENTIONS:  - Educate patient/family on patient safety including physical limitations  - Instruct patient to call for assistance with activity   - Consult OT/PT to assist with strengthening/mobility   - Keep Call bell within reach  - Keep bed low and locked with side rails adjusted as appropriate  - Keep care items and personal belongings within reach  - Initiate and maintain comfort rounds  - Make Fall Risk Sign visible to staff  - Offer Toileting every as needed Hours, in advance of need  - Initiate/Maintain  bed alarm  - Obtain necessary fall risk management equipment: socks  - Apply yellow socks and bracelet for high fall risk patients  - Consider moving patient to room near nurses station  Outcome: Progressing  Goal: Maintain or return to baseline ADL function  Description: INTERVENTIONS:  -  Assess patient's ability to carry out ADLs; assess patient's baseline for ADL function and identify physical deficits which impact ability to perform ADLs (bathing, care of mouth/teeth, toileting, grooming, dressing, etc )  - Assess/evaluate cause of self-care deficits   - Assess range of motion  - Assess patient's mobility; develop plan if impaired  - Assess patient's need for assistive devices and provide as appropriate  - Encourage maximum independence but intervene and supervise when necessary  - Involve family in performance of ADLs  - Assess for home care needs following discharge   - Consider OT consult to assist with ADL evaluation and planning for discharge  - Provide patient education as appropriate  Outcome: Progressing  Goal: Maintains/Returns to pre admission functional level  Description: INTERVENTIONS:  - Perform BMAT or MOVE assessment daily    - Set and communicate daily mobility goal to care team and patient/family/caregiver  - Collaborate with rehabilitation services on mobility goals if consulted  - Perform Range of Motion  0 times a day  - Reposition patient every per pt request hours    - Dangle patient 2 times a day  - Stand patient 3 times a day  - Ambulate patient 2 times a day  - Out of bed to chair 2 times a day   - Out of bed for meals 2 times a day  - Out of bed for toileting  - Record patient progress and toleration of activity level   Outcome: Progressing

## 2021-07-03 NOTE — PROGRESS NOTES
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 therapy, follows with [facility] endocrinology last evaluated in December of 2020  · Hypokalemia of 1 8 on admission, most recent 3 1  · Will continue to replete  · Suspect secondary to GI losses as the patient has chronic diarrhea and goes approximately 5 times a day however will workup for additional causes of hypokalemia  Given patient's hypertension hypokalemia, will continue workup for primary aldosteronism  · Aldosterone/renin level pending  · Urine potassium-2 1, repeat 23 8  · BMP in a m  · Mag 2 1  · Without hypotension, no concern for adrenal crisis at this time  · Nephrology consulted, appreciate recommendations    Contusion of right hand  Assessment & Plan  · S/p fall  · XR negative for fracture  · Continue ice and supportive care    Shoulder pain  Assessment & Plan  Initially presented to the emergency department after a fall in her left shoulder  With recent left shoulder arthroplasty, outpatient orthopedist recommended come to the emergency department to rule out dislocation  · X-ray of left shoulder revealed left shoulder arthroplasty in anatomic alignment  No acute fracture dislocation    · Lidocaine patch ordered  · Ice    History of migraine  Assessment & Plan  · High utilizer  · Follows closely with outpatient Neurology  · Review high utilizer plan    History of pulmonary embolism  Assessment & Plan  · With history of pulmonary embolism  · On chronic anticoagulation with Eliquis 5 mg b i d , will continue at this time    Bipolar disorder  Assessment & Plan  · Continue Abilify 5 mg q d  · Luvox 300 mg HS          VTE Pharmacologic Prophylaxis:   High Risk (Score >/= 5) - Pharmacological DVT Prophylaxis Ordered: apixaban (Eliquis)  Sequential Compression Devices Ordered  Patient Centered Rounds: I performed bedside rounds with nursing staff today  Discussions with Specialists or Other Care Team Provider: case management, Nephrology AP    Education and Discussions with Family / Patient: Patient declined call to   Time Spent for Care: 30 minutes  More than 50% of total time spent on counseling and coordination of care as described above  Current Length of Stay: 2 day(s)  Current Patient Status: Inpatient   Certification Statement: The patient will continue to require additional inpatient hospital stay due to potassium repletion  Discharge Plan: await stability of potassium levels    Code Status: Level 1 - Full Code    Subjective:   C/O right hand pain and soreness  Also with a headache  Objective:     Vitals:   Temp (24hrs), Av 3 °F (36 3 °C), Min:97 1 °F (36 2 °C), Max:97 7 °F (36 5 °C)    Temp:  [97 1 °F (36 2 °C)-97 7 °F (36 5 °C)] 97 7 °F (36 5 °C)  HR:  [71-86] 86  Resp:  [18-20] 18  BP: (121-143)/(59-86) 143/86  SpO2:  [90 %-96 %] 96 %  Body mass index is 35 53 kg/m²  Input and Output Summary (last 24 hours): Intake/Output Summary (Last 24 hours) at 7/3/2021 1052  Last data filed at 2021 1346  Gross per 24 hour   Intake --   Output 200 ml   Net -200 ml       Physical Exam:   Physical Exam  Constitutional:       Appearance: Normal appearance  Cardiovascular:      Rate and Rhythm: Normal rate and regular rhythm  Heart sounds: No murmur heard  Pulmonary:      Effort: Pulmonary effort is normal       Breath sounds: Normal breath sounds  Abdominal:      General: Bowel sounds are normal  There is no distension  Palpations: Abdomen is soft  Tenderness: There is no abdominal tenderness  Skin:     General: Skin is warm and dry  Comments: Right hand ecchymosis on rodriguez aspect   Neurological:      General: No focal deficit present  Mental Status: She is alert and oriented to person, place, and time  Psychiatric:         Mood and Affect: Mood normal          Additional Data:     Labs:  Results from last 7 days   Lab Units 07/03/21  0520 07/02/21  0524   WBC Thousand/uL 9 11 9 55   HEMOGLOBIN g/dL 10 8* 10 4*   HEMATOCRIT % 34 4* 32 6*   PLATELETS Thousands/uL 381 407*   NEUTROS PCT %  --  60   LYMPHS PCT %  --  25   MONOS PCT %  --  10   EOS PCT %  --  3     Results from last 7 days   Lab Units 07/03/21  0520 07/01/21  2352 07/01/21  1707   SODIUM mmol/L 143   < > 144   POTASSIUM mmol/L 3 1*  --  1 8*   CHLORIDE mmol/L 103   < > 100   CO2 mmol/L 37*   < > 42*   BUN mg/dL 11   < > 12   CREATININE mg/dL 0 86   < > 0 91   ANION GAP mmol/L 3*   < > 2*   CALCIUM mg/dL 8 4   < > 8 8   ALBUMIN g/dL  --   --  2 7*   TOTAL BILIRUBIN mg/dL  --   --  0 20   ALK PHOS U/L  --   --  147*   ALT U/L  --   --  19   AST U/L  --   --  23   GLUCOSE RANDOM mg/dL 89   < > 89    < > = values in this interval not displayed       Results from last 7 days   Lab Units 07/01/21  1707   INR  1 22*                   Lines/Drains:  Invasive Devices     Peripheral Intravenous Line            Peripheral IV 07/01/21 Right Antecubital 2 days                      Imaging: Reviewed radiology reports from this admission including: xray(s)    Recent Cultures (last 7 days):         Last 24 Hours Medication List:   Current Facility-Administered Medications   Medication Dose Route Frequency Provider Last Rate    acetaminophen  650 mg Oral Q6H PRN Elma Peabody, PA-C      apixaban  5 mg Oral BID Elma Peabody, PA-C      ARIPiprazole  5 mg Oral Daily Elma Peabody, Ludger Chalet      docusate sodium  100 mg Oral BID Elma Peabody, PA-C      fluvoxaMINE  300 mg Oral HS Elma Peabody, PA-C      gabapentin  300 mg Oral TID Ferrell Olszewski, PA-C      lamoTRIgine  200 mg Oral Daily Ferrell Olszewski, [place]      lidocaine  1 patch Topical HS Mercy Santiago MD      loperamide  2 mg Oral TID PRN Ferrell Olszewski, PA-C      LORazepam  2 mg Oral HS Ferrell Olszewski, [place]      LORazepam  2 mg Oral BID (AM & Afternoon) Ferrell Olszewski, PA-C      meclizine  12 5 mg Oral Q8H PRN Ferrell Olszewski, PA-C      ondansetron  4 mg Intravenous Q6H PRN Ferrell Olszewski, PA-C      oxyCODONE  5 mg Oral Q4H PRN Mercy Santiago MD      pantoprazole  40 mg Oral Early Morning Ferrell Olszewski, [place]      potassium chloride  40 mEq Oral Daily Tg Eaton DO      potassium chloride  20 mEq Intravenous Q2H MOON Mars          Today, Patient Was Seen By: Clayton George PA-C    **Please Note: This note may have been constructed using a voice recognition system  **

## 2021-07-04 LAB
ANION GAP SERPL CALCULATED.3IONS-SCNC: 1 MMOL/L (ref 4–13)
ANION GAP SERPL CALCULATED.3IONS-SCNC: 3 MMOL/L (ref 4–13)
BUN SERPL-MCNC: 10 MG/DL (ref 5–25)
BUN SERPL-MCNC: 14 MG/DL (ref 5–25)
CALCIUM SERPL-MCNC: 8.5 MG/DL (ref 8.3–10.1)
CALCIUM SERPL-MCNC: 8.8 MG/DL (ref 8.3–10.1)
CHLORIDE SERPL-SCNC: 103 MMOL/L (ref 100–108)
CHLORIDE SERPL-SCNC: 106 MMOL/L (ref 100–108)
CO2 SERPL-SCNC: 36 MMOL/L (ref 21–32)
CO2 SERPL-SCNC: 38 MMOL/L (ref 21–32)
CORTIS SERPL-MCNC: 3.5 UG/DL
CREAT SERPL-MCNC: 0.82 MG/DL (ref 0.6–1.3)
CREAT SERPL-MCNC: 0.93 MG/DL (ref 0.6–1.3)
GFR SERPL CREATININE-BSD FRML MDRD: 66 ML/MIN/1.73SQ M
GFR SERPL CREATININE-BSD FRML MDRD: 77 ML/MIN/1.73SQ M
GLUCOSE SERPL-MCNC: 101 MG/DL (ref 65–140)
GLUCOSE SERPL-MCNC: 94 MG/DL (ref 65–140)
POTASSIUM SERPL-SCNC: 3.2 MMOL/L (ref 3.5–5.3)
POTASSIUM SERPL-SCNC: 4.5 MMOL/L (ref 3.5–5.3)
SODIUM SERPL-SCNC: 143 MMOL/L (ref 136–145)
SODIUM SERPL-SCNC: 144 MMOL/L (ref 136–145)

## 2021-07-04 PROCEDURE — 99232 SBSQ HOSP IP/OBS MODERATE 35: CPT | Performed by: PHYSICIAN ASSISTANT

## 2021-07-04 PROCEDURE — 99233 SBSQ HOSP IP/OBS HIGH 50: CPT | Performed by: INTERNAL MEDICINE

## 2021-07-04 PROCEDURE — 80048 BASIC METABOLIC PNL TOTAL CA: CPT | Performed by: NURSE PRACTITIONER

## 2021-07-04 PROCEDURE — 80048 BASIC METABOLIC PNL TOTAL CA: CPT | Performed by: INTERNAL MEDICINE

## 2021-07-04 PROCEDURE — 82533 TOTAL CORTISOL: CPT | Performed by: NURSE PRACTITIONER

## 2021-07-04 RX ORDER — POTASSIUM CHLORIDE 29.8 MG/ML
40 INJECTION INTRAVENOUS ONCE
Status: DISCONTINUED | OUTPATIENT
Start: 2021-07-04 | End: 2021-07-04

## 2021-07-04 RX ORDER — POTASSIUM CHLORIDE 14.9 MG/ML
20 INJECTION INTRAVENOUS
Status: COMPLETED | OUTPATIENT
Start: 2021-07-04 | End: 2021-07-05

## 2021-07-04 RX ORDER — POTASSIUM CHLORIDE 20 MEQ/1
40 TABLET, EXTENDED RELEASE ORAL 2 TIMES DAILY
Status: DISCONTINUED | OUTPATIENT
Start: 2021-07-04 | End: 2021-07-05

## 2021-07-04 RX ORDER — MAGNESIUM SULFATE HEPTAHYDRATE 40 MG/ML
2 INJECTION, SOLUTION INTRAVENOUS
Status: DISCONTINUED | OUTPATIENT
Start: 2021-07-04 | End: 2021-07-05

## 2021-07-04 RX ORDER — CYCLOBENZAPRINE HCL 10 MG
10 TABLET ORAL EVERY MORNING
Status: COMPLETED | OUTPATIENT
Start: 2021-07-04 | End: 2021-07-04

## 2021-07-04 RX ADMIN — POTASSIUM CHLORIDE 40 MEQ: 1500 TABLET, EXTENDED RELEASE ORAL at 08:11

## 2021-07-04 RX ADMIN — ONDANSETRON 4 MG: 2 INJECTION INTRAMUSCULAR; INTRAVENOUS at 08:09

## 2021-07-04 RX ADMIN — OXYCODONE HYDROCHLORIDE 5 MG: 5 TABLET ORAL at 02:28

## 2021-07-04 RX ADMIN — GABAPENTIN 300 MG: 300 CAPSULE ORAL at 08:11

## 2021-07-04 RX ADMIN — GABAPENTIN 300 MG: 300 CAPSULE ORAL at 21:32

## 2021-07-04 RX ADMIN — ONDANSETRON 4 MG: 2 INJECTION INTRAMUSCULAR; INTRAVENOUS at 17:09

## 2021-07-04 RX ADMIN — APIXABAN 5 MG: 5 TABLET, FILM COATED ORAL at 08:12

## 2021-07-04 RX ADMIN — CYCLOBENZAPRINE HYDROCHLORIDE 10 MG: 10 TABLET, FILM COATED ORAL at 08:11

## 2021-07-04 RX ADMIN — POTASSIUM CHLORIDE 20 MEQ: 14.9 INJECTION, SOLUTION INTRAVENOUS at 08:12

## 2021-07-04 RX ADMIN — FLUVOXAMINE MALEATE 300 MG: 50 TABLET ORAL at 21:33

## 2021-07-04 RX ADMIN — ARIPIPRAZOLE 5 MG: 10 TABLET ORAL at 08:11

## 2021-07-04 RX ADMIN — LORAZEPAM 2 MG: 1 TABLET ORAL at 21:33

## 2021-07-04 RX ADMIN — ACETAMINOPHEN 650 MG: 325 TABLET, FILM COATED ORAL at 02:28

## 2021-07-04 RX ADMIN — POTASSIUM CHLORIDE 20 MEQ: 14.9 INJECTION, SOLUTION INTRAVENOUS at 10:07

## 2021-07-04 RX ADMIN — APIXABAN 5 MG: 5 TABLET, FILM COATED ORAL at 17:09

## 2021-07-04 RX ADMIN — POTASSIUM CHLORIDE 40 MEQ: 1500 TABLET, EXTENDED RELEASE ORAL at 17:09

## 2021-07-04 RX ADMIN — GABAPENTIN 300 MG: 300 CAPSULE ORAL at 17:09

## 2021-07-04 RX ADMIN — ACETAMINOPHEN 650 MG: 325 TABLET, FILM COATED ORAL at 08:12

## 2021-07-04 RX ADMIN — LORAZEPAM 2 MG: 1 TABLET ORAL at 08:11

## 2021-07-04 RX ADMIN — OXYCODONE HYDROCHLORIDE 5 MG: 5 TABLET ORAL at 17:11

## 2021-07-04 RX ADMIN — PANTOPRAZOLE SODIUM 40 MG: 40 TABLET, DELAYED RELEASE ORAL at 05:58

## 2021-07-04 RX ADMIN — LORAZEPAM 2 MG: 1 TABLET ORAL at 17:09

## 2021-07-04 RX ADMIN — LAMOTRIGINE 200 MG: 100 TABLET ORAL at 08:12

## 2021-07-04 RX ADMIN — LIDOCAINE 1 PATCH: 50 PATCH TOPICAL at 21:34

## 2021-07-04 RX ADMIN — MAGNESIUM SULFATE HEPTAHYDRATE 2 G: 40 INJECTION, SOLUTION INTRAVENOUS at 08:29

## 2021-07-04 NOTE — PROGRESS NOTES
[facility]  Progress Note - Donna Larsen 1960, 56 y o  female MRN: [id number]  Unit/Bed#: E2 -58 Encounter: [id number]  Primary Care Provider: Ofelia Prince MD   Date and time admitted to hospital: 7/1/2021  4:20 PM    * Hypokalemia  Assessment & Plan  With a history of hypokalemia, previously hospitalized in March of 2020 at [facility] for hypokalemia of 2 8  Patient with history of [place]'s disease, not currently on any steroid therapy, follows with [facility] endocrinology last evaluated in December of 2020  · Hypokalemia of 1 8 on admission, most recent potassium 3 2   · Replete orally and IV today   · Suspect secondary to GI losses as the patient has chronic diarrhea and goes approximately 5 times a day however will workup for additional causes of hypokalemia  Given patient's hypertension hypokalemia, will continue workup for primary aldosteronism  · Aldosterone/renin level pending, cortisol level pending   · Urine potassium-2 1, repeat 23 8  · BMP in a m  · Giving mag today for migraine   · Without hypotension, no concern for adrenal crisis at this time  · Nephrology consulted, appreciate recommendations    Shoulder pain  Assessment & Plan  Initially presented to the emergency department after a fall on her left shoulder  With recent left shoulder arthroplasty, outpatient orthopedist recommended come to the emergency department to rule out dislocation  · X-ray of left shoulder revealed left shoulder arthroplasty in anatomic alignment  No acute fracture dislocation    · Supportive care   · Outpatient ortho follow up       History of migraine  Assessment & Plan  · High utilizer, which I reviewed today   · Follows closely with outpatient Neurology  · Given Imitrex yesterday   · toradol allergy   · Avoid narcotics   · Give IV zofran, flexeril and IV mag today     History of pulmonary embolism  Assessment & Plan  · With history of pulmonary embolism  · On chronic anticoagulation with Eliquis 5 mg b i d  Contusion of right hand  Assessment & Plan  · S/p fall  · XR negative for fracture  · Continue ice and supportive care      VTE Pharmacologic Prophylaxis:   Pharmacologic: Apixaban (Eliquis)  Mechanical VTE Prophylaxis in Place: Yes    Patient Centered Rounds: I have performed bedside rounds with nursing staff today  Discussions with Specialists or Other Care Team Provider: nephro     Education and Discussions with Family / Patient: patient at bedside, declined me calling family     Time Spent for Care: 20 minutes  More than 50% of total time spent on counseling and coordination of care as described above  Current Length of Stay: 3 day(s)    Current Patient Status: Inpatient   Certification Statement: The patient will continue to require additional inpatient hospital stay due to hypokalemia workup     Discharge Plan: pending clearance from nephro     Code Status: Level 1 - Full Code      Subjective:   Patient main complaint today is migraine  No aura but does feel slightly dizzy   No chest pain or palpations  No diarrhea yet this AM  No numbness/tingling  No weakness  She has chronic migraines  No vomiting but is nauseated      Objective:     Vitals:   Temp (24hrs), Av 1 °F (36 7 °C), Min:97 5 °F (36 4 °C), Max:99 1 °F (37 3 °C)    Temp:  [97 5 °F (36 4 °C)-99 1 °F (37 3 °C)] 97 7 °F (36 5 °C)  HR:  [74-87] 74  Resp:  [18] 18  BP: (141-153)/(74-85) 141/75  SpO2:  [94 %-97 %] 94 %  Body mass index is 35 53 kg/m²  Input and Output Summary (last 24 hours):     No intake or output data in the 24 hours ending 21 1126    Physical Exam:     Physical Exam  Vitals and nursing note reviewed  Constitutional:       Appearance: She is obese  Eyes:      Conjunctiva/sclera: Conjunctivae normal    Cardiovascular:      Rate and Rhythm: Normal rate and regular rhythm     Pulmonary:      Effort: Pulmonary effort is normal       Breath sounds: Normal breath sounds  Abdominal:      General: Bowel sounds are normal       Palpations: Abdomen is soft  Skin:     General: Skin is warm  Neurological:      General: No focal deficit present  Mental Status: She is alert  Psychiatric:         Mood and Affect: Mood normal          Additional Data:     Labs:    Results from last 7 days   Lab Units 07/03/21  0520 07/02/21  0524   WBC Thousand/uL 9 11 9 55   HEMOGLOBIN g/dL 10 8* 10 4*   HEMATOCRIT % 34 4* 32 6*   PLATELETS Thousands/uL 381 407*   NEUTROS PCT %  --  60   LYMPHS PCT %  --  25   MONOS PCT %  --  10   EOS PCT %  --  3     Results from last 7 days   Lab Units 07/04/21  0454 07/01/21  2352 07/01/21  1707   SODIUM mmol/L 144   < > 144   POTASSIUM mmol/L 3 2*  --  1 8*   CHLORIDE mmol/L 103   < > 100   CO2 mmol/L 38*   < > 42*   BUN mg/dL 10   < > 12   CREATININE mg/dL 0 82   < > 0 91   ANION GAP mmol/L 3*   < > 2*   CALCIUM mg/dL 8 5   < > 8 8   ALBUMIN g/dL  --   --  2 7*   TOTAL BILIRUBIN mg/dL  --   --  0 20   ALK PHOS U/L  --   --  147*   ALT U/L  --   --  19   AST U/L  --   --  23   GLUCOSE RANDOM mg/dL 94   < > 89    < > = values in this interval not displayed  Results from last 7 days   Lab Units 07/01/21  1707   INR  1 22*                       * I Have Reviewed All Lab Data Listed Above  * Additional Pertinent Lab Tests Reviewed:  All Labs Within Last 24 Hours Reviewed    Imaging:    Imaging Reports Reviewed Today Include: reviewed  Imaging Personally Reviewed by Myself Includes:      Recent Cultures (last 7 days):           Last 24 Hours Medication List:   Current Facility-Administered Medications   Medication Dose Route Frequency Provider Last Rate    acetaminophen  650 mg Oral Q6H PRN Guadelupe Beltran, PA-C      apixaban  5 mg Oral BID Charliadelupe Beltran, PA-C      ARIPiprazole  5 mg Oral Daily Adele Gong [place]      docusate sodium  100 mg Oral BID Guadelupe Mor, PA-C      fluvoxaMINE  300 mg Oral HS Guadelupe Mor, PA-C      gabapentin 300 mg Oral TID Ankita Nielson PA-C      lamoTRIgine  200 mg Oral Daily Diane Baldwin      lidocaine  1 patch Topical HS Misael Guerra MD      loperamide  2 mg Oral TID PRN Ankita Nielson PA-C      LORazepam  2 mg Oral HS AnkitaDiane Lane      LORazepam  2 mg Oral BID (AM & Afternoon) Ankita Nielson PA-C      magnesium sulfate  2 g Intravenous Q24H [address] Jacqueline Sy PA-C      meclizine  12 5 mg Oral Q8H PRN Ankita Nielson PA-C      ondansetron  4 mg Intravenous Q6H PRN Ankita Nielson PA-C      oxyCODONE  5 mg Oral Q4H PRN Misael Guerra MD      pantoprazole  40 mg Oral Early Morning Ankita Nielson PA-C      potassium chloride  40 mEq Oral BID MOON Boateng      potassium chloride  20 mEq Intravenous Q2H Jacqueline Sy PA-C 20 mEq (07/04/21 1007)        Today, Patient Was Seen By: Corrinne Rist, PA-C    ** Please Note: Dictation voice to text software may have been used in the creation of this document   **

## 2021-07-04 NOTE — PLAN OF CARE
Problem: METABOLIC, FLUID AND ELECTROLYTES - ADULT  Goal: Electrolytes maintained within normal limits  Description: INTERVENTIONS:  - Monitor labs and assess patient for signs and symptoms of electrolyte imbalances  - Administer electrolyte replacement as ordered  - Monitor response to electrolyte replacements, including repeat lab results as appropriate  - Instruct patient on fluid and nutrition as appropriate  Outcome: Progressing     Problem: PAIN - ADULT  Goal: Verbalizes/displays adequate comfort level or baseline comfort level  Description: Interventions:  - Encourage patient to monitor pain and request assistance  - Assess pain using appropriate pain scale  - Administer analgesics based on type and severity of pain and evaluate response  - Implement non-pharmacological measures as appropriate and evaluate response  - Consider cultural and social influences on pain and pain management  - Notify physician/advanced practitioner if interventions unsuccessful or patient reports new pain  Outcome: Progressing

## 2021-07-04 NOTE — ASSESSMENT & PLAN NOTE
With a history of hypokalemia, previously hospitalized in March of 2020 at [facility] for hypokalemia of 2 8  Patient with history of [place]'s disease, not currently on any steroid therapy, follows with [facility] endocrinology last evaluated in December of 2020  · Hypokalemia of 1 8 on admission, most recent potassium 3 2   · Replete orally and IV today   · Suspect secondary to GI losses as the patient has chronic diarrhea and goes approximately 5 times a day however will workup for additional causes of hypokalemia  Given patient's hypertension hypokalemia, will continue workup for primary aldosteronism  · Aldosterone/renin level pending, cortisol level pending   · Urine potassium-2 1, repeat 23 8  · BMP in a m    · Giving mag today for migraine   · Without hypotension, no concern for adrenal crisis at this time  · Nephrology consulted, appreciate recommendations

## 2021-07-04 NOTE — PLAN OF CARE
Problem: Potential for Falls  Goal: Patient will remain free of falls  Description: INTERVENTIONS:  - Educate patient/family on patient safety including physical limitations  - Instruct patient to call for assistance with activity   - Consult OT/PT to assist with strengthening/mobility   - Keep Call bell within reach  - Keep bed low and locked with side rails adjusted as appropriate  - Keep care items and personal belongings within reach  - Initiate and maintain comfort rounds  - Make Fall Risk Sign visible to staff  - Apply yellow socks and bracelet for high fall risk patients  - Consider moving patient to room near nurses station  Outcome: Progressing     Problem: METABOLIC, FLUID AND ELECTROLYTES - ADULT  Goal: Electrolytes maintained within normal limits  Description: INTERVENTIONS:  - Monitor labs and assess patient for signs and symptoms of electrolyte imbalances  - Administer electrolyte replacement as ordered  - Monitor response to electrolyte replacements, including repeat lab results as appropriate  - Instruct patient on fluid and nutrition as appropriate  Outcome: Progressing     Problem: PAIN - ADULT  Goal: Verbalizes/displays adequate comfort level or baseline comfort level  Description: Interventions:  - Encourage patient to monitor pain and request assistance  - Assess pain using appropriate pain scale  - Administer analgesics based on type and severity of pain and evaluate response  - Implement non-pharmacological measures as appropriate and evaluate response  - Consider cultural and social influences on pain and pain management  - Notify physician/advanced practitioner if interventions unsuccessful or patient reports new pain  Outcome: Progressing     Problem: SAFETY ADULT  Goal: Patient will remain free of falls  Description: INTERVENTIONS:  - Educate patient/family on patient safety including physical limitations  - Instruct patient to call for assistance with activity   - Consult OT/PT to assist with strengthening/mobility   - Keep Call bell within reach  - Keep bed low and locked with side rails adjusted as appropriate  - Keep care items and personal belongings within reach  - Initiate and maintain comfort rounds  - Make Fall Risk Sign visible to staff  - Apply yellow socks and bracelet for high fall risk patients  - Consider moving patient to room near nurses station  Outcome: Progressing  Goal: Maintain or return to baseline ADL function  Description: INTERVENTIONS:  -  Assess patient's ability to carry out ADLs; assess patient's baseline for ADL function and identify physical deficits which impact ability to perform ADLs (bathing, care of mouth/teeth, toileting, grooming, dressing, etc )  - Assess/evaluate cause of self-care deficits   - Assess range of motion  - Assess patient's mobility; develop plan if impaired  - Assess patient's need for assistive devices and provide as appropriate  - Encourage maximum independence but intervene and supervise when necessary  - Involve family in performance of ADLs  - Assess for home care needs following discharge   - Consider OT consult to assist with ADL evaluation and planning for discharge  - Provide patient education as appropriate  Outcome: Progressing  Goal: Maintains/Returns to pre admission functional level  Description: INTERVENTIONS:  - Perform BMAT or MOVE assessment daily    - Set and communicate daily mobility goal to care team and patient/family/caregiver     - Collaborate with rehabilitation services on mobility goals if consulted  - Out of bed for toileting  - Record patient progress and toleration of activity level   Outcome: Progressing

## 2021-07-04 NOTE — ASSESSMENT & PLAN NOTE
· High utilizer, which I reviewed today   · Follows closely with outpatient Neurology  · Given Imitrex yesterday   · toradol allergy   · Avoid narcotics   · Give IV zofran, flexeril and IV mag today

## 2021-07-04 NOTE — PROGRESS NOTES
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 in the past  -Has had chronic diarrhea prior to admission-last diarrhea yesterday a m  Per report  Assessment/Plan:  · Previously on HCTZ-however patient admits today that she has not taking HCTZ in one year  · Currently having higher blood pressure readings not lower blood pressure readings  · Admission potassium was 1 8  · Status post aggressive IV potassium supplements  · Current potassium 3 2  · Will increase oral potassium 40 mEq to b i d  · Will give additional IV 40 mEq x1 this a m  · Will change to q 12 hours BMP  · Continue to hold Dyazide  · Goal potassium 4 0 with repletion  · Workup:  ? Renin and aldosterone-pending  ? A m  Cortisol level-pending  ? Urine potassium 07/01/2021-2 1 and repeat urine potassium 07/02/2021 23 8  ?  Urine creatinine 113 4     Elevated bicarbonate:  Assessment plan:  § Bicarbonate stable at 38  § Suspect component of volume depletion  § VBG 07/02/2021-venous pH 7 373, CO2 62, O2 57 4, HCO3-35 3  § Previously treated with IV fluids-currently off     Blood pressure/hypertension:  Assessment and Plan:  · Home medications include:  Previously reported HCTZ however patient reports being off for about a year  · Current medications include:  None  · Current blood pressure stable 140s 150s  · Maximize hemodynamics to maintain MAP >65  · Avoid hypotension or fluctuations in blood pressure  · Will continue to trend     Diarrhea:  Assessment and Plan:  · Reported 4-5 bowel movements-and has been a chronic issue  · Previous Imodium  · Last bowel movement yesterday morning-per patient report  · Of note 30% of potassium excretion is through the GI got which could be contributing     History of [place] disease:   Assessment and Plan:  § Blood pressure stable high on arrival currently 140-150's  § Potassiums low not high  § A m  Cortisol level-pending  § May need to consider endocrinology consult     Other medical issues:  Recent shoulder surgery   History of pulmonary embolism-chronic anticoagulation with Eliquis 5 mg b i d  Bipolar disorder  History of migraines         SUBJECTIVE:  Patient seen and examined at bedside  Patient denies chest pain shortness of breath  Denies vomiting or having diarrhea-last bowel movement was yesterday    Patient reported having nausea this morning but eight her full breakfast    OBJECTIVE:  Current Weight: Weight - Scale: 106 kg (233 lb 11 oz)  Vitals:    07/03/21 0700 07/03/21 1500 07/03/21 2258 07/04/21 0751   BP: 143/86 143/74 153/85 141/75   BP Location: Left arm Left arm Left arm Left arm   Pulse: 86 87 81 74   Resp: 18 18 18 18   Temp: 97 7 °F (36 5 °C) 99 1 °F (37 3 °C) 97 5 °F (36 4 °C) 97 7 °F (36 5 °C)   TempSrc: Temporal Temporal Temporal Temporal   SpO2: 96% 94% 97% 94%   Weight:       Height:         No intake or output data in the 24 hours ending 07/04/21 1203  General:  No acute distress, cooperative, lying flat  Skin:  Warm and dry without rash  ENMT:  Mucous membranes moist, sclera anicteric  Neck:  Supple without JVD  Respiratory:  Essentially clear on auscultation without crackles, rhonchi, wheezes  Cardiac:  Regular rate and rhythm without rub, or murmur  GI:  Soft, nontender, no distention, active bowel sounds  Extremities:  No significant edema noted bilaterally  Neuro:  Alert oriented and awake, no gross deficits  Psych:  Appropriate affect    Medications:    Current Facility-Administered Medications:     acetaminophen (TYLENOL) tablet 650 mg, 650 mg, Oral, Q6H PRN, Dat Waters PA-C, 650 mg at 07/04/21 4282    apixaban (ELIQUIS) tablet 5 mg, 5 mg, Oral, BID, Dat Burows, PA-C, 5 mg at 07/04/21 3139    ARIPiprazole (ABILIFY) tablet 5 mg, 5 mg, Oral, Daily, Lc Garcia PA-C, 5 mg at 07/04/21 2912    docusate sodium (COLACE) capsule 100 mg, 100 mg, Oral, BID, Lc Garcia PA-C    fluvoxaMINE (LUVOX) tablet 300 mg, 300 mg, Oral, HS, Lc Garcia PA-C, 300 mg at 07/03/21 2136    gabapentin (NEURONTIN) capsule 300 mg, 300 mg, Oral, TID, Lc Garcia, PA-C, 300 mg at 07/04/21 1373    lamoTRIgine (LaMICtal) tablet 200 mg, 200 mg, Oral, Daily, Lc Garcia, PA-C, 200 mg at 07/04/21 0844    lidocaine (LIDODERM) 5 % patch 1 patch, 1 patch, Topical, HS, Glorine Leyden, MD, 1 patch at 07/03/21 2137    loperamide (IMODIUM) capsule 2 mg, 2 mg, Oral, TID PRN, Lc Garcia PA-C, 2 mg at 07/02/21 1034    LORazepam (ATIVAN) tablet 2 mg, 2 mg, Oral, HS, Lc Garcia PA-C, 2 mg at 07/03/21 2136    LORazepam (ATIVAN) tablet 2 mg, 2 mg, Oral, BID (AM & Afternoon), Lc Garcia PA-C, 2 mg at 07/04/21 0811    magnesium sulfate 2 g/50 mL IVPB (premix) 2 g, 2 g, Intravenous, Q24H [address], Jacqueline Sy, PA-C, 2 g at 07/04/21 0829    meclizine (ANTIVERT) tablet 12 5 mg, 12 5 mg, Oral, Q8H PRN, Lc Garcia PA-C    ondansetron [facility]) injection 4 mg, 4 mg, Intravenous, Q6H PRN, Lc Garcia PA-C, 4 mg at 07/04/21 0809    oxyCODONE (ROXICODONE) IR tablet 5 mg, 5 mg, Oral, Q4H PRN, Glorine Leyden, MD, 5 mg at 07/04/21 0228    pantoprazole (PROTONIX) EC tablet 40 mg, 40 mg, Oral, Early Morning, Lc Garcia PA-C, 40 mg at 07/04/21 0558    potassium chloride (K-DUR,KLOR-CON) CR tablet 40 mEq, 40 mEq, Oral, BID, Merl Cam, CRNP    potassium chloride 20 mEq IVPB (premix), 20 mEq, Intravenous, Q2H, Jacqueline Sy PA-C, Last Rate: 50 mL/hr at 07/04/21 1007, 20 mEq at 07/04/21 1007    Laboratory Results:  Results from last 7 days   Lab Units 07/04/21  0454 07/03/21  1942 07/03/21  1349 07/03/21  0520 07/02/21  2346 07/02/21  1852 07/02/21  1219 07/02/21  0626 07/02/21  0524 07/01/21  1707   WBC Thousand/uL  --   --   --  9 11  --   --   --   --  9 55 9 34   HEMOGLOBIN g/dL  --   --   --  10 8*  --   --   --   --  10 4* 11 5   HEMATOCRIT %  --   --   --  34 4*  --   --   --   --  32 6* 35 4   PLATELETS Thousands/uL  --   --   --  381  --   --   --   --  407* 420*   SODIUM mmol/L 144 141 141 143  --  144  --  145  --  144   POTASSIUM mmol/L 3 2* 3 6 3 3* 3 1* 4 3 3 2* 3 1* 2 3*  --  1 8*   CHLORIDE mmol/L 103 101 101 103  --  104  --  102  --  100   CO2 mmol/L 38* 39* 38* 37*  --  38*  --  37*  --  42*   BUN mg/dL 10 12 11 11  --  11  --  10  --  12   CREATININE mg/dL 0 82 0 95 0 98 0 86  --  0 92  --  0 79  --  0 91   CALCIUM mg/dL 8 5 8 1* 8 5 8 4  --  8 2*  --  8 4  --  8 8   MAGNESIUM mg/dL  --   --   --   --   --   --   --   --   --  2 1   PHOSPHORUS mg/dL  --   --   --   --   --   --   --   --   --  3 0

## 2021-07-04 NOTE — ASSESSMENT & PLAN NOTE
Initially presented to the emergency department after a fall on her left shoulder  With recent left shoulder arthroplasty, outpatient orthopedist recommended come to the emergency department to rule out dislocation  · X-ray of left shoulder revealed left shoulder arthroplasty in anatomic alignment  No acute fracture dislocation    · Supportive care   · Outpatient ortho follow up

## 2021-07-05 ENCOUNTER — APPOINTMENT (INPATIENT)
Dept: RADIOLOGY | Facility: HOSPITAL | Age: 61
DRG: 641 | End: 2021-07-05
Payer: COMMERCIAL

## 2021-07-05 PROBLEM — R09.02 HYPOXIA: Status: ACTIVE | Noted: 2021-07-05

## 2021-07-05 LAB
ANION GAP SERPL CALCULATED.3IONS-SCNC: 1 MMOL/L (ref 4–13)
BUN SERPL-MCNC: 13 MG/DL (ref 5–25)
CALCIUM SERPL-MCNC: 8.2 MG/DL (ref 8.3–10.1)
CHLORIDE SERPL-SCNC: 105 MMOL/L (ref 100–108)
CO2 SERPL-SCNC: 38 MMOL/L (ref 21–32)
CREAT SERPL-MCNC: 0.85 MG/DL (ref 0.6–1.3)
GFR SERPL CREATININE-BSD FRML MDRD: 74 ML/MIN/1.73SQ M
GLUCOSE SERPL-MCNC: 70 MG/DL (ref 65–140)
NT-PROBNP SERPL-MCNC: 499 PG/ML
POTASSIUM SERPL-SCNC: 4.1 MMOL/L (ref 3.5–5.3)
SODIUM SERPL-SCNC: 144 MMOL/L (ref 136–145)

## 2021-07-05 PROCEDURE — 84165 PROTEIN E-PHORESIS SERUM: CPT | Performed by: PATHOLOGY

## 2021-07-05 PROCEDURE — 99232 SBSQ HOSP IP/OBS MODERATE 35: CPT | Performed by: PHYSICIAN ASSISTANT

## 2021-07-05 PROCEDURE — 84165 PROTEIN E-PHORESIS SERUM: CPT | Performed by: INTERNAL MEDICINE

## 2021-07-05 PROCEDURE — 83880 ASSAY OF NATRIURETIC PEPTIDE: CPT | Performed by: PHYSICIAN ASSISTANT

## 2021-07-05 PROCEDURE — 86334 IMMUNOFIX E-PHORESIS SERUM: CPT | Performed by: PATHOLOGY

## 2021-07-05 PROCEDURE — 86334 IMMUNOFIX E-PHORESIS SERUM: CPT | Performed by: INTERNAL MEDICINE

## 2021-07-05 PROCEDURE — 99232 SBSQ HOSP IP/OBS MODERATE 35: CPT | Performed by: INTERNAL MEDICINE

## 2021-07-05 PROCEDURE — 71045 X-RAY EXAM CHEST 1 VIEW: CPT

## 2021-07-05 PROCEDURE — 83883 ASSAY NEPHELOMETRY NOT SPEC: CPT | Performed by: INTERNAL MEDICINE

## 2021-07-05 PROCEDURE — 84166 PROTEIN E-PHORESIS/URINE/CSF: CPT | Performed by: PATHOLOGY

## 2021-07-05 PROCEDURE — 84166 PROTEIN E-PHORESIS/URINE/CSF: CPT | Performed by: INTERNAL MEDICINE

## 2021-07-05 PROCEDURE — 80048 BASIC METABOLIC PNL TOTAL CA: CPT | Performed by: NURSE PRACTITIONER

## 2021-07-05 RX ORDER — POTASSIUM CHLORIDE 20 MEQ/1
20 TABLET, EXTENDED RELEASE ORAL 2 TIMES DAILY
Status: DISCONTINUED | OUTPATIENT
Start: 2021-07-05 | End: 2021-07-06 | Stop reason: HOSPADM

## 2021-07-05 RX ORDER — SUMATRIPTAN 50 MG/1
50 TABLET, FILM COATED ORAL ONCE
Status: COMPLETED | OUTPATIENT
Start: 2021-07-05 | End: 2021-07-06

## 2021-07-05 RX ORDER — CYCLOBENZAPRINE HCL 10 MG
10 TABLET ORAL 3 TIMES DAILY PRN
Status: DISCONTINUED | OUTPATIENT
Start: 2021-07-05 | End: 2021-07-06 | Stop reason: HOSPADM

## 2021-07-05 RX ADMIN — MAGNESIUM SULFATE HEPTAHYDRATE 2 G: 40 INJECTION, SOLUTION INTRAVENOUS at 08:30

## 2021-07-05 RX ADMIN — ARIPIPRAZOLE 5 MG: 10 TABLET ORAL at 08:29

## 2021-07-05 RX ADMIN — APIXABAN 5 MG: 5 TABLET, FILM COATED ORAL at 08:29

## 2021-07-05 RX ADMIN — GABAPENTIN 300 MG: 300 CAPSULE ORAL at 17:41

## 2021-07-05 RX ADMIN — LAMOTRIGINE 200 MG: 100 TABLET ORAL at 08:29

## 2021-07-05 RX ADMIN — LORAZEPAM 2 MG: 1 TABLET ORAL at 22:56

## 2021-07-05 RX ADMIN — OXYCODONE HYDROCHLORIDE 5 MG: 5 TABLET ORAL at 22:57

## 2021-07-05 RX ADMIN — OXYCODONE HYDROCHLORIDE 5 MG: 5 TABLET ORAL at 04:35

## 2021-07-05 RX ADMIN — LORAZEPAM 2 MG: 1 TABLET ORAL at 13:00

## 2021-07-05 RX ADMIN — FLUVOXAMINE MALEATE 300 MG: 50 TABLET ORAL at 22:57

## 2021-07-05 RX ADMIN — POTASSIUM CHLORIDE 20 MEQ: 1500 TABLET, EXTENDED RELEASE ORAL at 17:41

## 2021-07-05 RX ADMIN — LIDOCAINE 1 PATCH: 50 PATCH TOPICAL at 22:56

## 2021-07-05 RX ADMIN — POTASSIUM CHLORIDE 20 MEQ: 1500 TABLET, EXTENDED RELEASE ORAL at 08:29

## 2021-07-05 RX ADMIN — OXYCODONE HYDROCHLORIDE 5 MG: 5 TABLET ORAL at 17:41

## 2021-07-05 RX ADMIN — PANTOPRAZOLE SODIUM 40 MG: 40 TABLET, DELAYED RELEASE ORAL at 06:05

## 2021-07-05 RX ADMIN — GABAPENTIN 300 MG: 300 CAPSULE ORAL at 22:57

## 2021-07-05 RX ADMIN — LORAZEPAM 2 MG: 1 TABLET ORAL at 08:29

## 2021-07-05 RX ADMIN — APIXABAN 5 MG: 5 TABLET, FILM COATED ORAL at 17:41

## 2021-07-05 RX ADMIN — OXYCODONE HYDROCHLORIDE 5 MG: 5 TABLET ORAL at 08:32

## 2021-07-05 RX ADMIN — CYCLOBENZAPRINE HYDROCHLORIDE 10 MG: 10 TABLET, FILM COATED ORAL at 19:20

## 2021-07-05 RX ADMIN — GABAPENTIN 300 MG: 300 CAPSULE ORAL at 08:30

## 2021-07-05 NOTE — ASSESSMENT & PLAN NOTE
On morning labs patient not to have drop in O2 sat 87% and placed on 2 lpm nasal cannula oxygen   She denies increased SOB, cough, chest congestion or other respiratory complaints   She does have hand swelling bilaterally, no LE edema   She is not currently on fluids   Will repeat CXR today   Encourage OOB and ambulation   She was given IS to use tid as able   Last echo July 2019: EF 05%, grade 1 diastolic dysfunction mild AS

## 2021-07-05 NOTE — ASSESSMENT & PLAN NOTE
With a history of hypokalemia, previously hospitalized in March of 2020 at [facility] for hypokalemia of 2 8  Patient with history of [place]'s disease, not currently on any steroid therapy, follows with [facility] endocrinology last evaluated in December of 2020  · Hypokalemia of 1 8 on admission, most recent potassium 4 1   · Replete orally 20 mEq bid   · Suspect secondary to GI losses as the patient has chronic diarrhea and goes approximately 5 times a day however will workup for additional causes of hypokalemia  Given patient's hypertension hypokalemia, will continue workup for primary aldosteronism as well as labs sent yesterday to rule out myeloma   · Cortisol level 3 5  · Aldosterone/renin level pending   · Urine potassium-2 1, repeat 23 8  · BMP in a m    · Without hypotension, no concern for adrenal crisis at this time  · Nephrology consulted, appreciate ongoing recommendations

## 2021-07-05 NOTE — PROGRESS NOTES
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 Renin and aldosterone-pending less likely secondary causes hypo kalemia at this time  ? A m  Cortisol level-3 5-since potassium within normal limits and blood pressure acceptable-will hold off on cortisol stimulation test  ? Urine potassium 07/01/2021-2 1 and repeat urine potassium 07/02/2021 23 8  ?  Urine creatinine 113 4     Elevated bicarbonate:  Assessment plan:  § Bicarbonate stable at 38  § Suspect component of volume depletion  § VBG 07/02/2021-venous pH 7 373, CO2 62, O2 57 4, HCO3-35 3  § Previously treated with IV fluids and remains off     Blood pressure/hypertension:  Assessment and Plan:  · Home medications include:  Previously reported HCTZ however patient reports being off for about a year  · Current medications include:  None  · Current blood pressure stable 140s 150s  · Maximize hemodynamics to maintain MAP >65  · Avoid hypotension or fluctuations in blood pressure  · Will continue to trend     Diarrhea:  Assessment and Plan:  · Reported 4-5 bowel movements previously-and has been a chronic issue  · Previous Imodium  · Last bowel movement yesterday morning-per patient report  · Of note 30% of potassium excretion is through the GI got which could be contributing  · Of note, has hx of gastric sleeve     History of [place] disease:   Assessment and Plan:  § Blood pressure stable high on arrival currently 140-150's  § Potassium levels now within normal limits on potassium supplement  § May need to consider endocrinology consult     Other medical issues:  Recent shoulder surgery   History of pulmonary embolism-chronic anticoagulation with Eliquis 5 mg b i d  Bipolar disorder  History of migraines     SUBJECTIVE:  Patient seen and examined at bedside  Feeling okay  Reports having one episode of diarrhea yesterday, none since  Denies chest pain, shortness of breath, nausea vomiting this a m   Patient reports previous gastric sleeve and does not normally eat a lot    OBJECTIVE:  Current Weight: Weight - Scale: 106 kg (233 lb 11 oz)  Vitals:    07/04/21 2216 07/05/21 0725 07/05/21 0732 07/05/21 0738   BP: 154/72 147/71     BP Location:  Left arm     Pulse: 75 84     Resp: 20 20     Temp: 97 8 °F (36 6 °C) 98 2 °F (36 8 °C)     TempSrc: Temporal Temporal     SpO2: 96% (!) 87% 90% 94%   Weight:       Height:         No intake or output data in the 24 hours ending 07/05/21 1127  General:  No acute distress, cooperative,   Skin:  Warm and dry without rash  ENMT:  Mucous membranes moist, sclera anicteric  Neck:  Supple without JVD  Respiratory:  Essentially clear on auscultation without crackles, rhonchi, wheezes  Cardiac:  Regular rate and rhythm without rub, or murmur  GI:  Soft, nontender, no distention, active bowel sounds  Extremities:  No significant edema noted bilaterally  Neuro:  Alert oriented and awake  Psych:  Appropriate affect    Medications:    Current Facility-Administered Medications:     acetaminophen (TYLENOL) tablet 650 mg, 650 mg, Oral, Q6H PRN, Merritt Ruiz PA-C, 650 mg at 07/04/21 3764   apixaban (ELIQUIS) tablet 5 mg, 5 mg, Oral, BID, Donivan Sade, PA-C, 5 mg at 07/05/21 0223    ARIPiprazole (ABILIFY) tablet 5 mg, 5 mg, Oral, Daily, Donivan Sade, PA-C, 5 mg at 07/05/21 5740    cyclobenzaprine (FLEXERIL) tablet 10 mg, 10 mg, Oral, TID PRN, Dakota Galvez PA-C    docusate sodium (COLACE) capsule 100 mg, 100 mg, Oral, BID, Stella Stuart, PA-C    fluvoxaMINE (LUVOX) tablet 300 mg, 300 mg, Oral, HS, Stella Stuart, PA-C, 300 mg at 07/04/21 2133    gabapentin (NEURONTIN) capsule 300 mg, 300 mg, Oral, TID, Stella Stuart, PA-C, 300 mg at 07/05/21 0830    lamoTRIgine (LaMICtal) tablet 200 mg, 200 mg, Oral, Daily, Stella Stuart, PA-C, 200 mg at 07/05/21 0829    lidocaine (LIDODERM) 5 % patch 1 patch, 1 patch, Topical, HS, Katheryn Montemayor MD, 1 patch at 07/04/21 2134    loperamide (IMODIUM) capsule 2 mg, 2 mg, Oral, TID PRN, Stella Stuart, PA-C, 2 mg at 07/02/21 1034    LORazepam (ATIVAN) tablet 2 mg, 2 mg, Oral, HS, Kaiivan Sade, PA-C, 2 mg at 07/04/21 2133    LORazepam (ATIVAN) tablet 2 mg, 2 mg, Oral, BID (AM & Afternoon), Kaiivan Sade, PA-C, 2 mg at 07/05/21 4312    meclizine (ANTIVERT) tablet 12 5 mg, 12 5 mg, Oral, Q8H PRN, Stella Stuart, PA-C    ondansetron [facility]) injection 4 mg, 4 mg, Intravenous, Q6H PRN, Donivan Sade, PA-C, 4 mg at 07/04/21 1709    oxyCODONE (ROXICODONE) IR tablet 5 mg, 5 mg, Oral, Q4H PRN, Katheryn Montemayor MD, 5 mg at 07/05/21 0832    pantoprazole (PROTONIX) EC tablet 40 mg, 40 mg, Oral, Early Morning, Stella Stuart PA-C, 40 mg at 07/05/21 0605    potassium chloride (K-DUR,KLOR-CON) CR tablet 20 mEq, 20 mEq, Oral, BID, Heladio Landers MD, 20 mEq at 07/05/21 6448    Laboratory Results:  Results from last 7 days   Lab Units 07/05/21  0434 07/04/21 2014 07/04/21  0454 07/03/21  1942 07/03/21  1349 07/03/21  0520 07/02/21  2346 07/02/21  1852 07/02/21  0524 07/01/21  2352 07/01/21  1707   WBC Thousand/uL  --   --   --   --   --  9 11  --   --  9 55  --  9 34   HEMOGLOBIN g/dL  --   -- --   --   --  10 8*  --   --  10 4*  --  11 5   HEMATOCRIT %  --   --   --   --   --  34 4*  --   --  32 6*  --  35 4   PLATELETS Thousands/uL  --   --   --   --   --  381  --   --  407*  --  420*   SODIUM mmol/L 144 143 144 141 141 143  --  144  --    < > 144   POTASSIUM mmol/L 4 1 4 5 3 2* 3 6 3 3* 3 1* 4 3 3 2*  --   --  1 8*   CHLORIDE mmol/L 105 106 103 101 101 103  --  104  --    < > 100   CO2 mmol/L 38* 36* 38* 39* 38* 37*  --  38*  --    < > 42*   BUN mg/dL 13 14 10 12 11 11  --  11  --    < > 12   CREATININE mg/dL 0 85 0 93 0 82 0 95 0 98 0 86  --  0 92  --    < > 0 91   CALCIUM mg/dL 8 2* 8 8 8 5 8 1* 8 5 8 4  --  8 2*  --    < > 8 8   MAGNESIUM mg/dL  --   --   --   --   --   --   --   --   --   --  2 1   PHOSPHORUS mg/dL  --   --   --   --   --   --   --   --   --   --  3 0    < > = values in this interval not displayed

## 2021-07-05 NOTE — ASSESSMENT & PLAN NOTE
· High utilizer, which I reviewed   · Follows closely with outpatient Neurology  · Given Imitrex 7/3  · toradol allergy   · Avoid narcotics   · Give IV zofran, flexeril and IV mag 7/4 with improvement, monitor off steroids for now

## 2021-07-05 NOTE — PROGRESS NOTES
[address] [facility]  Progress Note - Doron Ge 1960, 56 y o  female MRN: [id number]  Unit/Bed#: E2 -18 Encounter: [id number]  Primary Care Provider: Malika Mcclelland MD   Date and time admitted to hospital: 7/1/2021  4:20 PM    * Hypokalemia  Assessment & Plan  With a history of hypokalemia, previously hospitalized in March of 2020 at [facility] for hypokalemia of 2 8  Patient with history of Andrés's disease, not currently on any steroid therapy, follows with [facility] endocrinology last evaluated in December of 2020  · Hypokalemia of 1 8 on admission, most recent potassium 4 1   · Replete orally 20 mEq bid   · Suspect secondary to GI losses as the patient has chronic diarrhea and goes approximately 5 times a day however will workup for additional causes of hypokalemia  Given patient's hypertension hypokalemia, will continue workup for primary aldosteronism as well as labs sent yesterday to rule out myeloma   · Cortisol level 3 5  · Aldosterone/renin level pending   · Urine potassium-2 1, repeat 23 8  · BMP in a m  · Without hypotension, no concern for adrenal crisis at this time  · Nephrology consulted, appreciate ongoing recommendations    Shoulder pain  Assessment & Plan  Initially presented to the emergency department after a fall on her left shoulder  With recent left shoulder arthroplasty, outpatient orthopedist recommended come to the emergency department to rule out dislocation  · X-ray of left shoulder revealed left shoulder arthroplasty in anatomic alignment  No acute fracture dislocation    · Supportive care   · Outpatient ortho follow up       History of migraine  Assessment & Plan  · High utilizer, which I reviewed   · Follows closely with outpatient Neurology  · Given Imitrex 7/3  · toradol allergy   · Avoid narcotics   · Give IV zofran, flexeril and IV mag 7/4 with improvement, monitor off steroids for now     History of pulmonary embolism  Assessment & Plan  · With history of pulmonary embolism  · On chronic anticoagulation with Eliquis 5 mg b i d  Bipolar disorder  Assessment & Plan  · Continue Abilify 5 mg q d  · Luvox 300 mg HS  · PDMP reviewed, maintain on ativan     Hypoxia  Assessment & Plan  On morning labs patient not to have drop in O2 sat 87% and placed on 2 lpm nasal cannula oxygen   She denies increased SOB, cough, chest congestion or other respiratory complaints   She does have hand swelling bilaterally, no LE edema   She is not currently on fluids   Will repeat CXR today   Encourage OOB and ambulation   She was given IS to use tid as able   Last echo July 2019: EF 53%, grade 1 diastolic dysfunction mild AS    Contusion of right hand  Assessment & Plan  · S/p fall  · XR negative for fracture  · Continue ice and supportive care      VTE Pharmacologic Prophylaxis:   Pharmacologic: Apixaban (Eliquis)  Mechanical VTE Prophylaxis in Place: No    Patient Centered Rounds: I have performed bedside rounds with nursing staff today  Discussions with Specialists or Other Care Team Provider: will discuss with nephrology     Education and Discussions with Family / Patient: patient at the bedside, she declined me calling any family     Time Spent for Care: 20 minutes  More than 50% of total time spent on counseling and coordination of care as described above  Current Length of Stay: 4 day(s)    Current Patient Status: Inpatient   Certification Statement: The patient will continue to require additional inpatient hospital stay due to hypokalemia workup, hypoxia     Discharge Plan: pending completed workup     Code Status: Level 1 - Full Code      Subjective:   Patient is resting in bed  Per nursing staff she was noted to have SPO2 87% on morning vitals and placed on 2 lpm nasal cannula  Patient denies any acute respiratory complaints but states she sometimes needs oxygen in the hospital  She sravan fevers, chills  No chest pain  Headache is better today  She has hand swelling localized ot hand only  No leg swelling  No orthopnea, cough, chest congestion, difficulty lying flat  She was given IS to use this morning  She states yesterday she had one episode of diarrhea (typically has around 5 daily)  Objective:     Vitals:   Temp (24hrs), Av 9 °F (36 6 °C), Min:97 7 °F (36 5 °C), Max:98 2 °F (36 8 °C)    Temp:  [97 7 °F (36 5 °C)-98 2 °F (36 8 °C)] 98 2 °F (36 8 °C)  HR:  [75-84] 84  Resp:  [16-20] 20  BP: (134-154)/(70-72) 147/71  SpO2:  [87 %-96 %] 94 %  Body mass index is 35 53 kg/m²  Input and Output Summary (last 24 hours):     No intake or output data in the 24 hours ending 21 0934    Physical Exam:     Physical Exam  Vitals and nursing note reviewed  Constitutional:       Appearance: She is obese  Ill appearance: chroniclly ill appearing    Eyes:      Conjunctiva/sclera: Conjunctivae normal    Cardiovascular:      Rate and Rhythm: Normal rate and regular rhythm  Pulmonary:      Effort: Pulmonary effort is normal  No respiratory distress  Breath sounds: Normal breath sounds  No wheezing, rhonchi or rales  Comments: On 2 lpm nasal cannula   Abdominal:      General: Bowel sounds are normal       Palpations: Abdomen is soft  Musculoskeletal:         General: Swelling (hands bilaterally ) present  Right lower leg: No edema  Left lower leg: No edema  Skin:     General: Skin is warm  Coloration: Skin is pale  Neurological:      Mental Status: She is alert  Mental status is at baseline     Psychiatric:         Mood and Affect: Mood normal            Additional Data:     Labs:    Results from last 7 days   Lab Units 21  0520 21  0524   WBC Thousand/uL 9 11 9 55   HEMOGLOBIN g/dL 10 8* 10 4*   HEMATOCRIT % 34 4* 32 6*   PLATELETS Thousands/uL 381 407*   NEUTROS PCT %  --  60   LYMPHS PCT %  --  25   MONOS PCT %  --  10   EOS PCT %  --  3     Results from last 7 days   Lab Units 07/05/21  0434 07/01/21  2352 07/01/21  1707   SODIUM mmol/L 144   < > 144   POTASSIUM mmol/L 4 1  --  1 8*   CHLORIDE mmol/L 105   < > 100   CO2 mmol/L 38*   < > 42*   BUN mg/dL 13   < > 12   CREATININE mg/dL 0 85   < > 0 91   ANION GAP mmol/L 1*   < > 2*   CALCIUM mg/dL 8 2*   < > 8 8   ALBUMIN g/dL  --   --  2 7*   TOTAL BILIRUBIN mg/dL  --   --  0 20   ALK PHOS U/L  --   --  147*   ALT U/L  --   --  19   AST U/L  --   --  23   GLUCOSE RANDOM mg/dL 70   < > 89    < > = values in this interval not displayed  Results from last 7 days   Lab Units 07/01/21  1707   INR  1 22*                       * I Have Reviewed All Lab Data Listed Above  * Additional Pertinent Lab Tests Reviewed:  All Labs Within Last 24 Hours Reviewed    Imaging:    Imaging Reports Reviewed Today Include: pending repeat CXR  Imaging Personally Reviewed by Myself Includes:      Recent Cultures (last 7 days):           Last 24 Hours Medication List:   Current Facility-Administered Medications   Medication Dose Route Frequency Provider Last Rate    acetaminophen  650 mg Oral Q6H PRN SIRISHA Chin      apixaban  5 mg Oral BID SIRISHA Chin      ARIPiprazole  5 mg Oral Daily [place]      cyclobenzaprine  10 mg Oral TID PRN Abena Murray PA-C      docusate sodium  100 mg Oral BID SIRISHA Chin      fluvoxaMINE  300 mg Oral HS [place]      gabapentin  300 mg Oral TID SIRISHA Chin      lamoTRIgine  200 mg Oral Daily [place], [place]      lidocaine  1 patch Topical HS Davon Alcantar MD      loperamide  2 mg Oral TID PRN SIRISHA Chin      LORazepam  2 mg Oral HS [place]      LORazepam  2 mg Oral BID (AM & Afternoon) SIRISHA Chin      meclizine  12 5 mg Oral Q8H PRN SIRISHA Chin      ondansetron  4 mg Intravenous Q6H PRN SIRISHA Chin      oxyCODONE  5 mg Oral Q4H PRN Davon Alcantar MD      pantoprazole  40 mg Oral Early Morning SIRISHA Chin      potassium chloride 20 mEq Oral BID Yamil Venegas MD          Today, Patient Was Seen By: Gus Jacobsen PA-C    ** Please Note: Dictation voice to text software may have been used in the creation of this document   **

## 2021-07-06 VITALS
TEMPERATURE: 98 F | BODY MASS INDEX: 35.42 KG/M2 | RESPIRATION RATE: 19 BRPM | WEIGHT: 233.69 LBS | SYSTOLIC BLOOD PRESSURE: 141 MMHG | HEART RATE: 84 BPM | DIASTOLIC BLOOD PRESSURE: 84 MMHG | OXYGEN SATURATION: 91 % | HEIGHT: 68 IN

## 2021-07-06 PROBLEM — R09.02 HYPOXIA: Status: RESOLVED | Noted: 2021-07-05 | Resolved: 2021-07-06

## 2021-07-06 LAB
ALBUMIN UR ELPH-MCNC: 100 %
ALPHA1 GLOB MFR UR ELPH: 0 %
ALPHA2 GLOB MFR UR ELPH: 0 %
ANION GAP SERPL CALCULATED.3IONS-SCNC: 2 MMOL/L (ref 4–13)
B-GLOBULIN MFR UR ELPH: 0 %
BASOPHILS # BLD AUTO: 0.06 THOUSANDS/ΜL (ref 0–0.1)
BASOPHILS NFR BLD AUTO: 1 % (ref 0–1)
BUN SERPL-MCNC: 11 MG/DL (ref 5–25)
CALCIUM SERPL-MCNC: 8.3 MG/DL (ref 8.3–10.1)
CHLORIDE SERPL-SCNC: 104 MMOL/L (ref 100–108)
CO2 SERPL-SCNC: 37 MMOL/L (ref 21–32)
CREAT SERPL-MCNC: 0.84 MG/DL (ref 0.6–1.3)
EOSINOPHIL # BLD AUTO: 0.64 THOUSAND/ΜL (ref 0–0.61)
EOSINOPHIL NFR BLD AUTO: 8 % (ref 0–6)
ERYTHROCYTE [DISTWIDTH] IN BLOOD BY AUTOMATED COUNT: 15.2 % (ref 11.6–15.1)
GAMMA GLOB MFR UR ELPH: 0 %
GFR SERPL CREATININE-BSD FRML MDRD: 75 ML/MIN/1.73SQ M
GLUCOSE SERPL-MCNC: 82 MG/DL (ref 65–140)
HCT VFR BLD AUTO: 35.4 % (ref 34.8–46.1)
HGB BLD-MCNC: 11.2 G/DL (ref 11.5–15.4)
IMM GRANULOCYTES # BLD AUTO: 0.03 THOUSAND/UL (ref 0–0.2)
IMM GRANULOCYTES NFR BLD AUTO: 0 % (ref 0–2)
LYMPHOCYTES # BLD AUTO: 2.25 THOUSANDS/ΜL (ref 0.6–4.47)
LYMPHOCYTES NFR BLD AUTO: 29 % (ref 14–44)
MCH RBC QN AUTO: 30.2 PG (ref 26.8–34.3)
MCHC RBC AUTO-ENTMCNC: 31.6 G/DL (ref 31.4–37.4)
MCV RBC AUTO: 95 FL (ref 82–98)
MONOCYTES # BLD AUTO: 0.83 THOUSAND/ΜL (ref 0.17–1.22)
MONOCYTES NFR BLD AUTO: 11 % (ref 4–12)
NEUTROPHILS # BLD AUTO: 4.04 THOUSANDS/ΜL (ref 1.85–7.62)
NEUTS SEG NFR BLD AUTO: 51 % (ref 43–75)
NRBC BLD AUTO-RTO: 0 /100 WBCS
PLATELET # BLD AUTO: 372 THOUSANDS/UL (ref 149–390)
PMV BLD AUTO: 10.4 FL (ref 8.9–12.7)
POTASSIUM SERPL-SCNC: 4.2 MMOL/L (ref 3.5–5.3)
PROT PATTERN UR ELPH-IMP: ABNORMAL
PROT UR-MCNC: 26 MG/DL
RBC # BLD AUTO: 3.71 MILLION/UL (ref 3.81–5.12)
SODIUM SERPL-SCNC: 143 MMOL/L (ref 136–145)
WBC # BLD AUTO: 7.85 THOUSAND/UL (ref 4.31–10.16)

## 2021-07-06 PROCEDURE — 80048 BASIC METABOLIC PNL TOTAL CA: CPT | Performed by: INTERNAL MEDICINE

## 2021-07-06 PROCEDURE — 99232 SBSQ HOSP IP/OBS MODERATE 35: CPT | Performed by: INTERNAL MEDICINE

## 2021-07-06 PROCEDURE — 85025 COMPLETE CBC W/AUTO DIFF WBC: CPT | Performed by: PHYSICIAN ASSISTANT

## 2021-07-06 PROCEDURE — 99239 HOSP IP/OBS DSCHRG MGMT >30: CPT | Performed by: PHYSICIAN ASSISTANT

## 2021-07-06 RX ORDER — DICYCLOMINE HCL 20 MG
20 TABLET ORAL
Status: DISCONTINUED | OUTPATIENT
Start: 2021-07-06 | End: 2021-07-06 | Stop reason: HOSPADM

## 2021-07-06 RX ADMIN — CYCLOBENZAPRINE HYDROCHLORIDE 10 MG: 10 TABLET, FILM COATED ORAL at 03:29

## 2021-07-06 RX ADMIN — OXYCODONE HYDROCHLORIDE 5 MG: 5 TABLET ORAL at 08:43

## 2021-07-06 RX ADMIN — SUMATRIPTAN SUCCINATE 50 MG: 50 TABLET ORAL at 00:52

## 2021-07-06 RX ADMIN — DICYCLOMINE HYDROCHLORIDE 20 MG: 20 TABLET ORAL at 11:01

## 2021-07-06 RX ADMIN — ONDANSETRON 4 MG: 2 INJECTION INTRAMUSCULAR; INTRAVENOUS at 11:05

## 2021-07-06 RX ADMIN — OXYCODONE HYDROCHLORIDE 5 MG: 5 TABLET ORAL at 03:29

## 2021-07-06 RX ADMIN — ARIPIPRAZOLE 5 MG: 10 TABLET ORAL at 08:37

## 2021-07-06 RX ADMIN — GABAPENTIN 300 MG: 300 CAPSULE ORAL at 08:37

## 2021-07-06 RX ADMIN — POTASSIUM CHLORIDE 20 MEQ: 1500 TABLET, EXTENDED RELEASE ORAL at 08:37

## 2021-07-06 RX ADMIN — PANTOPRAZOLE SODIUM 40 MG: 40 TABLET, DELAYED RELEASE ORAL at 06:02

## 2021-07-06 RX ADMIN — LORAZEPAM 2 MG: 1 TABLET ORAL at 08:37

## 2021-07-06 RX ADMIN — DOCUSATE SODIUM 100 MG: 100 CAPSULE ORAL at 08:37

## 2021-07-06 RX ADMIN — LAMOTRIGINE 200 MG: 100 TABLET ORAL at 08:37

## 2021-07-06 RX ADMIN — APIXABAN 5 MG: 5 TABLET, FILM COATED ORAL at 08:37

## 2021-07-06 NOTE — DISCHARGE INSTRUCTIONS
You will need close follow up with Nephrology outpatient for further workup of causes for your hypokalemia

## 2021-07-06 NOTE — DISCHARGE SUMMARY
[address] [facility]  Discharge- Salma Pearce 1960, 56 y o  female MRN: [id number]  Unit/Bed#: E2 -67 Encounter: [id number]  Primary Care Provider: Janay Pearce MD   Date and time admitted to hospital: 7/1/2021  4:20 PM    * Hypokalemia  Assessment & Plan  With a history of hypokalemia, previously hospitalized in March of 2020 at [facility] for hypokalemia of 2 8  Patient with history of Andrés's disease, not currently on any steroid therapy, follows with [facility] endocrinology last evaluated in December of 2020  · Hypokalemia of 1 8 on admission, most recent potassium 4 1   · Suspect secondary to GI losses as the patient has chronic diarrhea and goes approximately 5 times a day however will workup for additional causes of hypokalemia  Given patient's hypertension hypokalemia, will continue workup for primary aldosteronism as well as labs sent yesterday to rule out myeloma   · Cortisol level 3 5  · Aldosterone/renin level pending   · Urine potassium-2 1, repeat 23 8  · Without hypotension, no concern for adrenal crisis at this time  · Nephrology consulted, appreciate ongoing recommendations  · Workup for myleoma- pending   · Okay for discharge from renal standpoint with outpatient follow up of pending lab and for further evaluation/workup in the outpatient setting   · Repeat BMP in 1 week outpatient     Shoulder pain  Assessment & Plan  Initially presented to the emergency department after a fall on her left shoulder  With recent left shoulder arthroplasty, outpatient orthopedist recommended come to the emergency department to rule out dislocation  · X-ray of left shoulder revealed left shoulder arthroplasty in anatomic alignment  No acute fracture dislocation    · Supportive care   · Outpatient ortho follow up         History of migraine  Assessment & Plan  · High utilizer, which I reviewed   · Follows closely with outpatient Neurology  · Given Imitrex 7/3  · toradol allergy   · Avoid narcotics   · Give IV zofran, flexeril and IV mag 7/4 with improvement, did not require steroids while here      History of pulmonary embolism  Assessment & Plan  · With history of pulmonary embolism  · On chronic anticoagulation with Eliquis 5 mg b i d      Bipolar disorder  Assessment & Plan  · Continue Abilify 5 mg q d  · Luvox 300 mg HS  · PDMP reviewed, maintain on ativan         Contusion of right hand  Assessment & Plan  · S/p fall  · XR negative for fracture  · Continue ice and supportive care       Discharging Physician / Practitioner: Gus Jacobsen PA-C  PCP: Tess Michaels MD  Admission Date:   Admission Orders (From admission, onward)     Ordered        07/01/21 2023  INPATIENT ADMISSION  Once                   Discharge Date: 07/06/21    Medical Problems     Resolved Problems  Date Reviewed: 7/6/2021        Resolved    Hypoxia 7/6/2021     Resolved by  Gus Jacobsen PA-C                Consultations During Hospital Stay:  · Nephrology     Procedures Performed:   · none    Significant Findings / Test Results:   · Xr left shoulder: Left shoulder arthroplasty in anatomic alignment  No acute fracture or dislocation  · CXR: No radiographic evidence of acute intrathoracic process  · CT head: No acute intracranial abnormality  · CT cervical spine: No cervical spine fracture or traumatic malalignment  · XR right hand:   FINDINGS:     There is no acute fracture or dislocation      Mild osteoarthritis of the DIP and PIP joints  Moderate degenerative changes at the 1st carpometacarpal joint    Degenerative changes at the radiocarpal joint      No lytic or blastic osseous lesion      Soft tissues are unremarkable      IMPRESSION:     No acute osseous abnormality      Degenerative changes as described    · Cortisol: 3 5   Incidental Findings:   · none    Test Results Pending at Discharge (will require follow up):   · Protein electrophoresis, serum and urine (07/01/21 1554)  SpO2: 91 % (07/06/21 0700)  Exam:   Physical Exam  Vitals and nursing note reviewed  Constitutional:       Appearance: She is obese  HENT:      Head: Normocephalic  Eyes:      Conjunctiva/sclera: Conjunctivae normal    Cardiovascular:      Rate and Rhythm: Normal rate and regular rhythm  Pulmonary:      Effort: Pulmonary effort is normal  No respiratory distress  Breath sounds: Normal breath sounds  No wheezing, rhonchi or rales  Comments: Room air   Abdominal:      General: Bowel sounds are normal    Musculoskeletal:      Right lower leg: No edema  Left lower leg: No edema  Skin:     Coloration: Skin is pale  Neurological:      Mental Status: She is alert  Mental status is at baseline  Psychiatric:         Mood and Affect: Mood normal            Discharge instructions/Information to patient and family:   See after visit summary for information provided to patient and family  Provisions for Follow-Up Care:  See after visit summary for information related to follow-up care and any pertinent home health orders  Disposition:     Home    For Discharges to [facility] SNF:   · Not Applicable to this Patient - Not Applicable to this Patient    Planned Readmission: high risk for readmission      Discharge Statement:  I spent 45 minutes discharging the patient  This time was spent on the day of discharge  I had direct contact with the patient on the day of discharge  Greater than 50% of the total time was spent examining patient, answering all patient questions, arranging and discussing plan of care with patient as well as directly providing post-discharge instructions  Additional time then spent on discharge activities  Discharge Medications:  See after visit summary for reconciled discharge medications provided to patient and family        ** Please Note: This note has been constructed using a voice recognition system **

## 2021-07-06 NOTE — PROGRESS NOTES
NEPHROLOGY PROGRESS NOTE   Patrick Olivera 56 y o  female MRN: [id number]  Unit/Bed#: E2 -01 Encounter: [id number]  Reason for Consult:  Hypokalemia    Plan:  -acute on chronic hypokalemia, resolved  Recommend KCl 20 mEq daily at discharge  Continue to hold thiazide diuretic  Myeloma workup pending  Recommend continued follow-up with PCP per patient's request   -elevated bicarbonate level, improved with hydration  Currently monitoring off of fluids  -hypertension, currently off of antihypertensives, blood pressure overall acceptable  -Chronic diarrhea, history of gastric sleeve  Continues on Bentyl and Imodium   -Andrés's disease, continue outpatient follow-up with endocrinology   -shoulder pain, continue outpatient follow-up with Orthopedics  ASSESSMENT/PLAN:  Acute on chronic hypokalemia:  Suspected secondary to increased GI loss with chronic diarrhea and thiazide  History of Pocahontas's disease   -presented with potassium of 1 8   -most recent potassium stable at 4 1   -continue to hold thiazide diuretic   -continues to standing potassium replacement, KCl 20 mEq b i d     Would recommend potassium 20 mEq daily at discharge   -myeloma workup pending   -cortisol level 3 5 with normotension, aldosterone/renin level pending  -urine potassium 23 8  Elevated bicarbonate level:  Suspect due to contraction alkalosis with volume depletion  Improving    -currently monitoring off of IV fluids  Hypertension:  Blood pressure overall acceptable   -previously on HCTZ, however patient reports being office for approximately a year   -currently not on any antihypertensives   -avoid hypotension or high fluctuations in blood pressure   -goal systolic blood pressure less than 130/90  Chronic diarrhea:  History of gastric sleeve   -may benefit from GI consultation   -continues on Bentyl 4 times per day and Imodium as needed      Andrés's disease:  -follows outpatient with [facility] endocrinology  -currently not on steroid therapy  Shoulder pain:  With recent left shoulder arthroplasty  -x-ray negative for acute abnormality   -continue pain management, outpatient orthopedic follow-up  Other:  History of pulmonary embolism on Eliquis, bipolar disorder, migraine headache, gastric sleeve  Disposition:  Okay to discharge from Renal once medically cleared  SUBJECTIVE:  The patient is resting in bed  She was seen eating breakfast   She denies any chest pain or increased shortness of breath  She reports feeling nauseous and abdominal pain  She is still having some diarrhea  She denies vomiting      OBJECTIVE:  Current Weight: Weight - Scale: 106 kg (233 lb 11 oz)  Vitals:    07/05/21 1810 07/05/21 2300 07/06/21 0500 07/06/21 0700   BP:  130/77  141/84   BP Location:  Left arm  Left arm   Pulse:  68  84   Resp:  20  19   Temp:  (!) 96 7 °F (35 9 °C)  98 °F (36 7 °C)   TempSrc:  Temporal  Temporal   SpO2: 91% 97% 95% 91%   Weight:       Height:         No intake or output data in the 24 hours ending 07/06/21 1036  General: NAD  Skin: warm, dry, intact, no rash  HEENT: Moist mucous membranes, sclera anicteric, normocephalic, atraumatic  Neck: No apparent JVD appreciated  Chest: lung sounds clear B/L, on RA   CVS:Regular rate and rhythm, no murmer   Abdomen: Soft, round, tender, +BS  Extremities: B/L LE edema present  Neuro: alert and oriented  Psych: appropriate mood and affect     Medications:    Current Facility-Administered Medications:     acetaminophen (TYLENOL) tablet 650 mg, 650 mg, Oral, Q6H PRN, Angelica Sagastume PA-C, 650 mg at 07/04/21 4046    apixaban (ELIQUIS) tablet 5 mg, 5 mg, Oral, BID, Angelica Sagastume PA-C, 5 mg at 07/06/21 5871    ARIPiprazole (ABILIFY) tablet 5 mg, 5 mg, Oral, Daily, Angelica Sagastume PA-C, 5 mg at 07/06/21 7229    cyclobenzaprine (FLEXERIL) tablet 10 mg, 10 mg, Oral, TID PRN, Santos Winston PA-C, 10 mg at 07/06/21 0329    dicyclomine (BENTYL) tablet 20 mg, 20 mg, Oral, 4x Daily (AC & HS), Jacqueline Sy PA-C    docusate sodium (COLACE) capsule 100 mg, 100 mg, Oral, BID, BRIANNA Dempsey MtC, 100 mg at 07/06/21 9253    fluvoxaMINE (LUVOX) tablet 300 mg, 300 mg, Oral, HS, Roselyn See PA-C, 300 mg at 07/05/21 2257    gabapentin (NEURONTIN) capsule 300 mg, 300 mg, Oral, TID, AVINASH Dempsey Mt-C, 300 mg at 07/06/21 3646    lamoTRIgine (LaMICtal) tablet 200 mg, 200 mg, Oral, Daily, Roselyn See PA-C, 200 mg at 07/06/21 1866    lidocaine (LIDODERM) 5 % patch 1 patch, 1 patch, Topical, HS, Myke De Souza MD, 1 patch at 07/05/21 2256    loperamide (IMODIUM) capsule 2 mg, 2 mg, Oral, TID PRN, AVINASH Dempsey Mt-C, 2 mg at 07/02/21 1034    LORazepam (ATIVAN) tablet 2 mg, 2 mg, Oral, HS, Roselyn See PA-C, 2 mg at 07/05/21 2256    LORazepam (ATIVAN) tablet 2 mg, 2 mg, Oral, BID (AM & Afternoon), AVINASH Dempsey Mt-C, 2 mg at 07/06/21 7377    meclizine (ANTIVERT) tablet 12 5 mg, 12 5 mg, Oral, Q8H PRN, Roselyn See PA-C    ondansetron [facility]) injection 4 mg, 4 mg, Intravenous, Q6H PRN, AVINASH Dempsey Mt-C, 4 mg at 07/04/21 1709    oxyCODONE (ROXICODONE) IR tablet 5 mg, 5 mg, Oral, Q4H PRN, Myke De Souza MD, 5 mg at 07/06/21 0843    pantoprazole (PROTONIX) EC tablet 40 mg, 40 mg, Oral, Early Morning, AVINASH Dempsey Mt-C, 40 mg at 07/06/21 0602    potassium chloride (K-DUR,KLOR-CON) CR tablet 20 mEq, 20 mEq, Oral, BID, Falguni Gonsales MD, 20 mEq at 07/06/21 8260    Laboratory Results:  Results from last 7 days   Lab Units 07/06/21  0730 07/06/21  0636 07/05/21  0434 07/04/21 2014 07/03/21  0520 07/02/21  0524 07/01/21  2352 07/01/21  1707   WBC Thousand/uL  --  7 85  --   --  9 11 9 55  --  9 34   HEMOGLOBIN g/dL  --  11 2*  --   --  10 8* 10 4*  --  11 5   HEMATOCRIT %  --  35 4  --   --  34 4* 32 6*  --  35 4   PLATELETS Thousands/uL  --  372  --   --  381 407*  --  420*   SODIUM mmol/L 143  --  144 143 143  --    < > 144   POTASSIUM mmol/L 4 2  --  4 1 4 5 3 1*  --   -- 1 8*   CHLORIDE mmol/L 104  --  105 106 103  --    < > 100   CO2 mmol/L 37*  --  38* 36* 37*  --    < > 42*   BUN mg/dL 11  --  13 14 11  --    < > 12   CREATININE mg/dL 0 84  --  0 85 0 93 0 86  --    < > 0 91   CALCIUM mg/dL 8 3  --  8 2* 8 8 8 4  --    < > 8 8   MAGNESIUM mg/dL  --   --   --   --   --   --   --  2 1   PHOSPHORUS mg/dL  --   --   --   --   --   --   --  3 0   ALK PHOS U/L  --   --   --   --   --   --   --  147*   ALT U/L  --   --   --   --   --   --   --  19   AST U/L  --   --   --   --   --   --   --  23    < > = values in this interval not displayed

## 2021-07-06 NOTE — DISCHARGE INSTR - AVS FIRST PAGE
Thank you for allowing us to participate in the care of your patient, Makeda Matias, who was hospitalized from 7/1/2021 through 7/6/2021 with the admitting diagnosis of mechanical fall at home after she tripped  Patient recently underwent left shoulder replacement and outpatient orthopedic provider recommended evaluation in the ED  On arrival, her left shoulder dimitri did not reveal any fracture or dislocation however lab work revealed severe hypokalemia with potassium 1 8  Patient was seen in consultation with Nephrology for management and evaulation of hypokalemia  Suspect hypokalemia is secondary to GI loss due to chronic diarrhea, urine potassium was high at 23 8 likely from extrarenal cause  Her HCTZ will be discontinued  She was worked up for secondary causes of hypokalemia  Upon discharge she will need follow up of pending lab work including renin-aldoesterone and lab workup to rule out myeloma  She should have repeat BMP in 1 week to monitor electrolytes  She should follow up with her PCP within 1 week for post hospital follow up and with Nephrology to follow up on pending lab work and for further evaluation in the outpatient setting  If you have any additional questions or would like to discuss further, please feel free to contact me      SIRISHA Graham [facility] Internal Medicine, Hospitalist  [phone]

## 2021-07-06 NOTE — ASSESSMENT & PLAN NOTE
With a history of hypokalemia, previously hospitalized in March of 2020 at [facility] for hypokalemia of 2 8  Patient with history of LaMoure's disease, not currently on any steroid therapy, follows with [facility] endocrinology last evaluated in December of 2020  · Hypokalemia of 1 8 on admission, most recent potassium 4 1   · Suspect secondary to GI losses as the patient has chronic diarrhea and goes approximately 5 times a day however will workup for additional causes of hypokalemia    Given patient's hypertension hypokalemia, will continue workup for primary aldosteronism as well as labs sent yesterday to rule out myeloma   · Cortisol level 3 5  · Aldosterone/renin level pending   · Urine potassium-2 1, repeat 23 8  · Without hypotension, no concern for adrenal crisis at this time  · Nephrology consulted, appreciate ongoing recommendations  · Workup for myleoma- pending   · Okay for discharge from renal standpoint with outpatient follow up of pending lab and for further evaluation/workup in the outpatient setting

## 2021-07-06 NOTE — PLAN OF CARE
Problem: Potential for Falls  Goal: Patient will remain free of falls  Description: INTERVENTIONS:  - Educate patient/family on patient safety including physical limitations  - Instruct patient to call for assistance with activity   - Consult OT/PT to assist with strengthening/mobility   - Keep Call bell within reach  - Keep bed low and locked with side rails adjusted as appropriate  - Keep care items and personal belongings within reach  - Initiate and maintain comfort rounds  - Make Fall Risk Sign visible to staff  - Apply yellow socks and bracelet for high fall risk patients  - Consider moving patient to room near nurses station  Outcome: Progressing     Problem: METABOLIC, FLUID AND ELECTROLYTES - ADULT  Goal: Electrolytes maintained within normal limits  Description: INTERVENTIONS:  - Monitor labs and assess patient for signs and symptoms of electrolyte imbalances  - Administer electrolyte replacement as ordered  - Monitor response to electrolyte replacements, including repeat lab results as appropriate  - Instruct patient on fluid and nutrition as appropriate  Outcome: Progressing     Problem: PAIN - ADULT  Goal: Verbalizes/displays adequate comfort level or baseline comfort level  Description: Interventions:  - Encourage patient to monitor pain and request assistance  - Assess pain using appropriate pain scale  - Administer analgesics based on type and severity of pain and evaluate response  - Implement non-pharmacological measures as appropriate and evaluate response  - Consider cultural and social influences on pain and pain management  - Notify physician/advanced practitioner if interventions unsuccessful or patient reports new pain  Outcome: Progressing

## 2021-07-07 LAB
ALBUMIN SERPL ELPH-MCNC: 2.65 G/DL (ref 3.5–5)
ALBUMIN SERPL ELPH-MCNC: 45.7 % (ref 52–65)
ALPHA1 GLOB SERPL ELPH-MCNC: 0.35 G/DL (ref 0.1–0.4)
ALPHA1 GLOB SERPL ELPH-MCNC: 6.1 % (ref 2.5–5)
ALPHA2 GLOB SERPL ELPH-MCNC: 0.77 G/DL (ref 0.4–1.2)
ALPHA2 GLOB SERPL ELPH-MCNC: 13.3 % (ref 7–13)
BETA GLOB ABNORMAL SERPL ELPH-MCNC: 0.46 G/DL (ref 0.4–0.8)
BETA1 GLOB SERPL ELPH-MCNC: 8 % (ref 5–13)
BETA2 GLOB SERPL ELPH-MCNC: 17 % (ref 2–8)
BETA2+GAMMA GLOB SERPL ELPH-MCNC: 0.99 G/DL (ref 0.2–0.5)
GAMMA GLOB ABNORMAL SERPL ELPH-MCNC: 0.57 G/DL (ref 0.5–1.6)
GAMMA GLOB SERPL ELPH-MCNC: 9.9 % (ref 12–22)
IGG/ALB SER: 0.84 {RATIO} (ref 1.1–1.8)
INTERPRETATION UR IFE-IMP: NORMAL
KAPPA LC FREE SER-MCNC: 21.9 MG/L (ref 3.3–19.4)
KAPPA LC FREE/LAMBDA FREE SER: 1.51 {RATIO} (ref 0.26–1.65)
LAMBDA LC FREE SERPL-MCNC: 14.5 MG/L (ref 5.7–26.3)
PROT PATTERN SERPL ELPH-IMP: ABNORMAL
PROT SERPL-MCNC: 5.8 G/DL (ref 6.4–8.2)

## 2021-07-07 NOTE — TELEPHONE ENCOUNTER
I spoke to patient and she stated that the L Shoulder is fine and she will keep her PO appt for 8/19 for evaluation

## 2021-07-07 NOTE — TELEPHONE ENCOUNTER
Patient is calling asking to speak to Phillip Avelar  Patient is not in need of a soon appt  She went to ED & they said everything was ok  I transferred patient      cb [phone]

## 2021-07-09 LAB
ALDOST SERPL-MCNC: <1 NG/DL (ref 0–30)
ALDOST/RENIN PLAS-RTO: ABNORMAL {RATIO}
RENIN PLAS-CCNC: <0.167 NG/ML/HR (ref 0.17–5.38)

## 2021-08-19 ENCOUNTER — OFFICE VISIT (OUTPATIENT)
Dept: OBGYN CLINIC | Facility: OTHER | Age: 61
End: 2021-08-19
Payer: COMMERCIAL

## 2021-08-19 VITALS
HEART RATE: 96 BPM | WEIGHT: 231 LBS | SYSTOLIC BLOOD PRESSURE: 123 MMHG | BODY MASS INDEX: 35.01 KG/M2 | HEIGHT: 68 IN | DIASTOLIC BLOOD PRESSURE: 82 MMHG

## 2021-08-19 DIAGNOSIS — M19.012 PRIMARY OSTEOARTHRITIS OF LEFT SHOULDER: Primary | ICD-10-CM

## 2021-08-19 PROCEDURE — 99213 OFFICE O/P EST LOW 20 MIN: CPT | Performed by: PHYSICIAN ASSISTANT

## 2021-08-19 RX ORDER — AMLODIPINE BESYLATE 2.5 MG/1
2.5 TABLET ORAL
COMMUNITY

## 2021-08-19 NOTE — PROGRESS NOTES
Assessment  Diagnoses and all orders for this visit:    Primary osteoarthritis of left shoulder  -     Ambulatory referral to Physical Therapy; Future    Discussion and Plan:    Nathaneil Romance has weakness of the left shoulder on exam   Unfortunately she is behind on her strengthening [place] as she has been receiving home occupational therapy  Her therapist just started strengthening 1-2 weeks ago  For best outcomes, we recommend she pursue outpatient PT, new Rx will be provided  1  Formal physical therapy - rotator cuff strengthening  5 months s p total shoulder  2  HEP - exercises per therapist  3  Local modalities at therapist discretion  4  Activities to tolerance  5  Follow up in 2 months - sooner if problems arise    Subjective:   Patient ID: Alla Schultz is a 56 y o  female      Malini Ruiz is 5 months s/p left total shoulder arthroplasty  She is doing well without any complaints of pain  She is able to perform ADLs with some functional limitation but without pain  She denies pain that interferes with sleep  She has been performing home therapy by an occupational therapist   No formal outpatient therapy  Malini Ruiz has been compliant with her stretches and HEP  Denies new injury or trauma  Li's main complaint is inability to elevate left arm overhead actively  The following portions of the patient's history were reviewed and updated as appropriate: allergies, current medications, past family history, past medical history, past social history, past surgical history and problem list     Review of Systems   Constitutional: Negative for chills and fever  HENT: Negative for hearing loss  Eyes: Negative for visual disturbance  Respiratory: Negative for shortness of breath  Cardiovascular: Negative for chest pain  Gastrointestinal: Negative for abdominal pain  Musculoskeletal:        As reviewed in the HPI   Skin: Negative for rash     Neurological:        As reviewed in the HPI Psychiatric/Behavioral: Negative for agitation  Objective:  /82 (BP Location: Right arm, Patient Position: Sitting, Cuff Size: Standard)   Pulse 96   Ht 5' 8" (1 727 m)   Wt 105 kg (231 lb)   BMI 35 12 kg/m²       Left Shoulder Exam     Tenderness   The patient is experiencing no tenderness  Range of Motion   Active abduction: 40   Passive abduction: normal   External rotation: normal   Forward flexion: 30 (passive full)   Internal rotation 0 degrees: normal     Muscle Strength   External rotation: 5/5   Subscapularis: 4/5     Other   Erythema: absent  Scars: present (healed deltopectoral incision)  Sensation: normal  Pulse: present             Physical Exam  Constitutional:       Appearance: She is well-developed  HENT:      Head: Normocephalic  Pulmonary:      Breath sounds: Normal breath sounds  No wheezing  Musculoskeletal:      Cervical back: Normal range of motion  Skin:     General: Skin is warm and dry  Neurological:      Mental Status: She is alert and oriented to person, place, and time  Psychiatric:         Behavior: Behavior normal          Thought Content:  Thought content normal          Judgment: Judgment normal

## 2021-08-20 ENCOUNTER — DOCUMENTATION (OUTPATIENT)
Dept: CASE MANAGEMENT | Facility: OTHER | Age: 61
End: 2021-08-20

## 2021-08-20 NOTE — MEDICAL HIGH UTILIZER
QGP: [id number]       : 1960     Age: 60       Sex: Tello Quiros   Utilization Background: She is a female with a history of migraine, Eitzens disease, Bipolar, Depression, Fibromyalgia, and HTN who presents to [facility] with migraine  She has 14 ER visits, 9 admission, and 2 transfers to [facility] for Ketamine Infusions in 2019  Discussed with Neurology reveals that patient does not feel much better even after prolonged hospitalization         Treatment Recommendations:  ED Recommendations: Recommendations as per neurology: Give migraine protocol: using steroid protocol d/t toradol allergy  Recommend calling her [facility] Neurologist to schedule close outpatient follow up  It is noted that the patient will provide other complaints to the ER providers to get admitted and then will complain of migraine in the hospital    Would not offer transfer for Ketamine Infusion to this patient as it has not worked in the past  This should be left up to Neurology to determine if this is appropriate         Inpatient Recommendations: Early consultation with neurology  Avoid narcotics/sedating agents due to over sedation in the past         Outpatient recommendations: Needs close follow up with [facility] Neurology  Needs CM to make sure that patient does not run out of her meds as she has in the past     Situation: Patient is a patient who presents frequently for migraines  It seems that she has started using other chief complaints to get admitted to the hospital for migraine treatment   As per neurology colleague her headache symptomatology does not usually change with treatment        PMH/PSH:  Migraine, Depression, Bipolar, Fibromyalgia, HTN, Hx of DVT      Assessment                              Drivers of repeated utilization:                 Symptomatology      Community Resources in place:    None -Antonieta Monae has mentioned that she does not have a  for infusions  May need assistance with transportation and with medications   Patient Care Team:   Kali Diaz DO - PCP [facility])  Corazon Corea MD - Neurology [facility])  OP Care Manager: Kriss Deluca RN                    Care plan date and owner: Gonzalo Alanis PA-C 10/31/2019         Reviewed with patient before discharge

## 2021-10-04 ENCOUNTER — EVALUATION (OUTPATIENT)
Dept: PHYSICAL THERAPY | Facility: CLINIC | Age: 61
End: 2021-10-04
Payer: COMMERCIAL

## 2021-10-04 DIAGNOSIS — M19.012 PRIMARY OSTEOARTHRITIS OF LEFT SHOULDER: Primary | ICD-10-CM

## 2021-10-04 PROCEDURE — 97110 THERAPEUTIC EXERCISES: CPT

## 2021-10-04 PROCEDURE — 97530 THERAPEUTIC ACTIVITIES: CPT

## 2021-10-04 PROCEDURE — 97161 PT EVAL LOW COMPLEX 20 MIN: CPT

## 2021-10-07 ENCOUNTER — OFFICE VISIT (OUTPATIENT)
Dept: PHYSICAL THERAPY | Facility: CLINIC | Age: 61
End: 2021-10-07
Payer: COMMERCIAL

## 2021-10-07 DIAGNOSIS — M19.012 PRIMARY OSTEOARTHRITIS OF LEFT SHOULDER: Primary | ICD-10-CM

## 2021-10-07 PROCEDURE — 97110 THERAPEUTIC EXERCISES: CPT

## 2021-10-07 PROCEDURE — 97530 THERAPEUTIC ACTIVITIES: CPT

## 2021-10-11 ENCOUNTER — APPOINTMENT (OUTPATIENT)
Dept: PHYSICAL THERAPY | Facility: CLINIC | Age: 61
End: 2021-10-11
Payer: COMMERCIAL

## 2021-10-14 ENCOUNTER — OFFICE VISIT (OUTPATIENT)
Dept: PHYSICAL THERAPY | Facility: CLINIC | Age: 61
End: 2021-10-14
Payer: COMMERCIAL

## 2021-10-14 DIAGNOSIS — M19.012 PRIMARY OSTEOARTHRITIS OF LEFT SHOULDER: Primary | ICD-10-CM

## 2021-10-14 PROCEDURE — 97530 THERAPEUTIC ACTIVITIES: CPT

## 2021-10-14 PROCEDURE — 97110 THERAPEUTIC EXERCISES: CPT

## 2021-10-14 PROCEDURE — 97112 NEUROMUSCULAR REEDUCATION: CPT

## 2021-10-18 ENCOUNTER — APPOINTMENT (OUTPATIENT)
Dept: PHYSICAL THERAPY | Facility: CLINIC | Age: 61
End: 2021-10-18
Payer: COMMERCIAL

## 2021-10-18 ENCOUNTER — OFFICE VISIT (OUTPATIENT)
Dept: OBGYN CLINIC | Facility: OTHER | Age: 61
End: 2021-10-18
Payer: COMMERCIAL

## 2021-10-18 VITALS
SYSTOLIC BLOOD PRESSURE: 151 MMHG | HEART RATE: 93 BPM | BODY MASS INDEX: 36.95 KG/M2 | DIASTOLIC BLOOD PRESSURE: 92 MMHG | WEIGHT: 243 LBS

## 2021-10-18 DIAGNOSIS — Z96.612 STATUS POST TOTAL REPLACEMENT OF LEFT SHOULDER: ICD-10-CM

## 2021-10-18 DIAGNOSIS — M19.012 PRIMARY OSTEOARTHRITIS OF LEFT SHOULDER: Primary | ICD-10-CM

## 2021-10-18 DIAGNOSIS — M75.41 IMPINGEMENT SYNDROME OF RIGHT SHOULDER: ICD-10-CM

## 2021-10-18 PROCEDURE — 20610 DRAIN/INJ JOINT/BURSA W/O US: CPT | Performed by: PHYSICIAN ASSISTANT

## 2021-10-18 PROCEDURE — 99214 OFFICE O/P EST MOD 30 MIN: CPT | Performed by: PHYSICIAN ASSISTANT

## 2021-10-18 RX ORDER — FUROSEMIDE 20 MG/1
20 TABLET ORAL AS NEEDED
COMMUNITY
Start: 2021-09-21

## 2021-10-18 RX ORDER — UBROGEPANT 50 MG/1
50 TABLET ORAL AS NEEDED
COMMUNITY
Start: 2021-08-23 | End: 2022-02-28 | Stop reason: ALTCHOICE

## 2021-10-18 RX ORDER — BETAMETHASONE SODIUM PHOSPHATE AND BETAMETHASONE ACETATE 3; 3 MG/ML; MG/ML
6 INJECTION, SUSPENSION INTRA-ARTICULAR; INTRALESIONAL; INTRAMUSCULAR; SOFT TISSUE
Status: COMPLETED | OUTPATIENT
Start: 2021-10-18 | End: 2021-10-18

## 2021-10-18 RX ORDER — BUPIVACAINE HYDROCHLORIDE 2.5 MG/ML
2 INJECTION, SOLUTION INFILTRATION; PERINEURAL
Status: COMPLETED | OUTPATIENT
Start: 2021-10-18 | End: 2021-10-18

## 2021-10-18 RX ORDER — DIHYDROERGOTAMINE MESYLATE 1 MG/ML
INJECTION, SOLUTION INTRAMUSCULAR; INTRAVENOUS; SUBCUTANEOUS
COMMUNITY
Start: 2021-09-03 | End: 2022-02-28 | Stop reason: ALTCHOICE

## 2021-10-18 RX ADMIN — BUPIVACAINE HYDROCHLORIDE 2 ML: 2.5 INJECTION, SOLUTION INFILTRATION; PERINEURAL at 14:49

## 2021-10-18 RX ADMIN — BETAMETHASONE SODIUM PHOSPHATE AND BETAMETHASONE ACETATE 6 MG: 3; 3 INJECTION, SUSPENSION INTRA-ARTICULAR; INTRALESIONAL; INTRAMUSCULAR; SOFT TISSUE at 14:49

## 2021-10-19 ENCOUNTER — OFFICE VISIT (OUTPATIENT)
Dept: PHYSICAL THERAPY | Facility: CLINIC | Age: 61
End: 2021-10-19
Payer: COMMERCIAL

## 2021-10-19 DIAGNOSIS — M19.012 PRIMARY OSTEOARTHRITIS OF LEFT SHOULDER: Primary | ICD-10-CM

## 2021-10-19 PROCEDURE — 97110 THERAPEUTIC EXERCISES: CPT

## 2021-10-19 PROCEDURE — 97530 THERAPEUTIC ACTIVITIES: CPT

## 2021-10-19 PROCEDURE — 97112 NEUROMUSCULAR REEDUCATION: CPT

## 2021-10-21 ENCOUNTER — OFFICE VISIT (OUTPATIENT)
Dept: PHYSICAL THERAPY | Facility: CLINIC | Age: 61
End: 2021-10-21
Payer: COMMERCIAL

## 2021-10-21 DIAGNOSIS — M19.012 PRIMARY OSTEOARTHRITIS OF LEFT SHOULDER: Primary | ICD-10-CM

## 2021-10-21 PROCEDURE — 97112 NEUROMUSCULAR REEDUCATION: CPT

## 2021-10-21 PROCEDURE — 97110 THERAPEUTIC EXERCISES: CPT

## 2021-10-21 PROCEDURE — 97530 THERAPEUTIC ACTIVITIES: CPT

## 2021-10-25 ENCOUNTER — OFFICE VISIT (OUTPATIENT)
Dept: PHYSICAL THERAPY | Facility: CLINIC | Age: 61
End: 2021-10-25
Payer: COMMERCIAL

## 2021-10-25 DIAGNOSIS — M19.012 PRIMARY OSTEOARTHRITIS OF LEFT SHOULDER: Primary | ICD-10-CM

## 2021-10-25 PROCEDURE — 97530 THERAPEUTIC ACTIVITIES: CPT

## 2021-10-25 PROCEDURE — 97112 NEUROMUSCULAR REEDUCATION: CPT

## 2021-10-25 PROCEDURE — 97110 THERAPEUTIC EXERCISES: CPT

## 2021-10-28 ENCOUNTER — OFFICE VISIT (OUTPATIENT)
Dept: PHYSICAL THERAPY | Facility: CLINIC | Age: 61
End: 2021-10-28
Payer: COMMERCIAL

## 2021-10-28 DIAGNOSIS — M19.012 PRIMARY OSTEOARTHRITIS OF LEFT SHOULDER: Primary | ICD-10-CM

## 2021-10-28 PROCEDURE — 97530 THERAPEUTIC ACTIVITIES: CPT

## 2021-10-28 PROCEDURE — 97112 NEUROMUSCULAR REEDUCATION: CPT

## 2021-11-01 ENCOUNTER — OFFICE VISIT (OUTPATIENT)
Dept: PHYSICAL THERAPY | Facility: CLINIC | Age: 61
End: 2021-11-01
Payer: COMMERCIAL

## 2021-11-01 DIAGNOSIS — M19.012 PRIMARY OSTEOARTHRITIS OF LEFT SHOULDER: Primary | ICD-10-CM

## 2021-11-01 PROCEDURE — 97110 THERAPEUTIC EXERCISES: CPT

## 2021-11-01 PROCEDURE — 97530 THERAPEUTIC ACTIVITIES: CPT

## 2021-11-04 ENCOUNTER — OFFICE VISIT (OUTPATIENT)
Dept: PHYSICAL THERAPY | Facility: CLINIC | Age: 61
End: 2021-11-04
Payer: COMMERCIAL

## 2021-11-04 DIAGNOSIS — M19.012 PRIMARY OSTEOARTHRITIS OF LEFT SHOULDER: Primary | ICD-10-CM

## 2021-11-04 PROCEDURE — 97112 NEUROMUSCULAR REEDUCATION: CPT

## 2021-11-04 PROCEDURE — 97110 THERAPEUTIC EXERCISES: CPT

## 2021-11-04 PROCEDURE — 97530 THERAPEUTIC ACTIVITIES: CPT

## 2021-11-08 ENCOUNTER — APPOINTMENT (OUTPATIENT)
Dept: PHYSICAL THERAPY | Facility: CLINIC | Age: 61
End: 2021-11-08
Payer: COMMERCIAL

## 2021-11-10 ENCOUNTER — HOSPITAL ENCOUNTER (OUTPATIENT)
Dept: RADIOLOGY | Facility: HOSPITAL | Age: 61
Discharge: HOME | End: 2021-11-10
Payer: COMMERCIAL

## 2021-11-10 ENCOUNTER — HOSPITAL ENCOUNTER (OUTPATIENT)
Dept: CT IMAGING | Facility: HOSPITAL | Age: 61
Discharge: HOME | End: 2021-11-10
Payer: COMMERCIAL

## 2021-11-10 DIAGNOSIS — Z96.612 STATUS POST TOTAL REPLACEMENT OF LEFT SHOULDER: ICD-10-CM

## 2021-11-10 DIAGNOSIS — M19.012 PRIMARY OSTEOARTHRITIS OF LEFT SHOULDER: ICD-10-CM

## 2021-11-10 PROCEDURE — 23350 INJECTION FOR SHOULDER X-RAY: CPT

## 2021-11-10 PROCEDURE — 73201 CT UPPER EXTREMITY W/DYE: CPT

## 2021-11-10 PROCEDURE — 77002 NEEDLE LOCALIZATION BY XRAY: CPT

## 2021-11-10 PROCEDURE — G1004 CDSM NDSC: HCPCS

## 2021-11-10 RX ORDER — LIDOCAINE HYDROCHLORIDE 10 MG/ML
7 INJECTION, SOLUTION EPIDURAL; INFILTRATION; INTRACAUDAL; PERINEURAL ONCE
Status: DISCONTINUED | OUTPATIENT
Start: 2021-11-10 | End: 2021-11-11 | Stop reason: HOSPADM

## 2021-11-10 RX ADMIN — IOHEXOL 7 ML: 300 INJECTION, SOLUTION INTRAVENOUS at 15:10

## 2021-11-11 ENCOUNTER — OFFICE VISIT (OUTPATIENT)
Dept: PHYSICAL THERAPY | Facility: CLINIC | Age: 61
End: 2021-11-11
Payer: COMMERCIAL

## 2021-11-11 DIAGNOSIS — M19.012 PRIMARY OSTEOARTHRITIS OF LEFT SHOULDER: Primary | ICD-10-CM

## 2021-11-11 PROCEDURE — 97112 NEUROMUSCULAR REEDUCATION: CPT | Performed by: PHYSICAL THERAPIST

## 2021-11-11 PROCEDURE — 97110 THERAPEUTIC EXERCISES: CPT | Performed by: PHYSICAL THERAPIST

## 2021-11-11 PROCEDURE — 97530 THERAPEUTIC ACTIVITIES: CPT | Performed by: PHYSICAL THERAPIST

## 2021-11-15 ENCOUNTER — OFFICE VISIT (OUTPATIENT)
Dept: OBGYN CLINIC | Facility: OTHER | Age: 61
End: 2021-11-15
Payer: COMMERCIAL

## 2021-11-15 VITALS
WEIGHT: 251.6 LBS | DIASTOLIC BLOOD PRESSURE: 80 MMHG | SYSTOLIC BLOOD PRESSURE: 152 MMHG | HEART RATE: 91 BPM | HEIGHT: 68 IN | BODY MASS INDEX: 38.13 KG/M2

## 2021-11-15 DIAGNOSIS — Z96.612 STATUS POST TOTAL REPLACEMENT OF LEFT SHOULDER: ICD-10-CM

## 2021-11-15 DIAGNOSIS — S46.812A FULL THICKNESS TEAR OF LEFT SUBSCAPULARIS TENDON, INITIAL ENCOUNTER: Primary | ICD-10-CM

## 2021-11-15 PROCEDURE — 99214 OFFICE O/P EST MOD 30 MIN: CPT | Performed by: ORTHOPAEDIC SURGERY

## 2021-11-15 RX ORDER — CHLORHEXIDINE GLUCONATE 0.12 MG/ML
15 RINSE ORAL ONCE
Status: CANCELLED | OUTPATIENT
Start: 2021-11-15 | End: 2021-11-15

## 2021-11-17 ENCOUNTER — DOCUMENTATION (OUTPATIENT)
Dept: CASE MANAGEMENT | Facility: OTHER | Age: 61
End: 2021-11-17

## 2021-11-18 ENCOUNTER — APPOINTMENT (OUTPATIENT)
Dept: PHYSICAL THERAPY | Facility: CLINIC | Age: 61
End: 2021-11-18
Payer: COMMERCIAL

## 2021-11-18 DIAGNOSIS — Z96.612 STATUS POST TOTAL REPLACEMENT OF LEFT SHOULDER: Primary | ICD-10-CM

## 2021-11-22 ENCOUNTER — OFFICE VISIT (OUTPATIENT)
Dept: PHYSICAL THERAPY | Facility: CLINIC | Age: 61
End: 2021-11-22
Payer: COMMERCIAL

## 2021-11-22 ENCOUNTER — ANESTHESIA EVENT (OUTPATIENT)
Dept: PERIOP | Facility: HOSPITAL | Age: 61
DRG: 516 | End: 2021-11-22
Payer: COMMERCIAL

## 2021-11-22 DIAGNOSIS — M19.012 PRIMARY OSTEOARTHRITIS OF LEFT SHOULDER: Primary | ICD-10-CM

## 2021-11-22 PROCEDURE — 97530 THERAPEUTIC ACTIVITIES: CPT

## 2021-11-22 PROCEDURE — 97110 THERAPEUTIC EXERCISES: CPT

## 2021-11-22 PROCEDURE — 97112 NEUROMUSCULAR REEDUCATION: CPT

## 2021-11-22 RX ORDER — LORAZEPAM 2 MG/1
TABLET ORAL
COMMUNITY
Start: 2021-10-25 | End: 2021-11-22

## 2021-11-22 RX ORDER — GABAPENTIN 600 MG/1
TABLET ORAL
COMMUNITY
Start: 2021-11-15 | End: 2021-11-22

## 2021-11-29 ENCOUNTER — OFFICE VISIT (OUTPATIENT)
Dept: PHYSICAL THERAPY | Facility: CLINIC | Age: 61
End: 2021-11-29
Payer: COMMERCIAL

## 2021-11-29 DIAGNOSIS — M19.012 PRIMARY OSTEOARTHRITIS OF LEFT SHOULDER: Primary | ICD-10-CM

## 2021-11-29 PROCEDURE — 97112 NEUROMUSCULAR REEDUCATION: CPT

## 2021-11-29 PROCEDURE — 97110 THERAPEUTIC EXERCISES: CPT

## 2021-11-29 PROCEDURE — 97530 THERAPEUTIC ACTIVITIES: CPT

## 2021-12-02 ENCOUNTER — OFFICE VISIT (OUTPATIENT)
Dept: PHYSICAL THERAPY | Facility: CLINIC | Age: 61
End: 2021-12-02
Payer: COMMERCIAL

## 2021-12-02 DIAGNOSIS — M19.012 PRIMARY OSTEOARTHRITIS OF LEFT SHOULDER: Primary | ICD-10-CM

## 2021-12-02 PROCEDURE — 97110 THERAPEUTIC EXERCISES: CPT

## 2021-12-02 PROCEDURE — 97530 THERAPEUTIC ACTIVITIES: CPT

## 2021-12-02 PROCEDURE — 97112 NEUROMUSCULAR REEDUCATION: CPT

## 2021-12-06 ENCOUNTER — APPOINTMENT (OUTPATIENT)
Dept: LAB | Facility: HOSPITAL | Age: 61
End: 2021-12-06
Attending: ORTHOPAEDIC SURGERY
Payer: COMMERCIAL

## 2021-12-06 ENCOUNTER — OFFICE VISIT (OUTPATIENT)
Dept: PHYSICAL THERAPY | Facility: CLINIC | Age: 61
End: 2021-12-06
Payer: COMMERCIAL

## 2021-12-06 DIAGNOSIS — Z01.818 OTHER SPECIFIED PRE-OPERATIVE EXAMINATION: ICD-10-CM

## 2021-12-06 DIAGNOSIS — Z01.812 PRE-OPERATIVE LABORATORY EXAMINATION: ICD-10-CM

## 2021-12-06 DIAGNOSIS — Z96.612 STATUS POST TOTAL REPLACEMENT OF LEFT SHOULDER: ICD-10-CM

## 2021-12-06 DIAGNOSIS — M19.012 PRIMARY OSTEOARTHRITIS OF LEFT SHOULDER: Primary | ICD-10-CM

## 2021-12-06 DIAGNOSIS — S46.812A FULL THICKNESS TEAR OF LEFT SUBSCAPULARIS TENDON, INITIAL ENCOUNTER: Primary | ICD-10-CM

## 2021-12-06 LAB
ALBUMIN SERPL BCP-MCNC: 3.5 G/DL (ref 3.5–5)
ALP SERPL-CCNC: 138 U/L (ref 46–116)
ALT SERPL W P-5'-P-CCNC: 19 U/L (ref 12–78)
ANION GAP SERPL CALCULATED.3IONS-SCNC: 12 MMOL/L (ref 4–13)
APTT PPP: 33 SECONDS (ref 23–37)
AST SERPL W P-5'-P-CCNC: 28 U/L (ref 5–45)
BASOPHILS # BLD AUTO: 0.06 THOUSANDS/ΜL (ref 0–0.1)
BASOPHILS NFR BLD AUTO: 1 % (ref 0–1)
BILIRUB SERPL-MCNC: 0.11 MG/DL (ref 0.2–1)
BUN SERPL-MCNC: 12 MG/DL (ref 5–25)
CALCIUM SERPL-MCNC: 9.2 MG/DL (ref 8.3–10.1)
CHLORIDE SERPL-SCNC: 104 MMOL/L (ref 100–108)
CO2 SERPL-SCNC: 23 MMOL/L (ref 21–32)
CREAT SERPL-MCNC: 0.84 MG/DL (ref 0.6–1.3)
EOSINOPHIL # BLD AUTO: 0.18 THOUSAND/ΜL (ref 0–0.61)
EOSINOPHIL NFR BLD AUTO: 1 % (ref 0–6)
ERYTHROCYTE [DISTWIDTH] IN BLOOD BY AUTOMATED COUNT: 17 % (ref 11.6–15.1)
GFR SERPL CREATININE-BSD FRML MDRD: 74 ML/MIN/1.73SQ M
GLUCOSE P FAST SERPL-MCNC: 95 MG/DL (ref 65–99)
HCT VFR BLD AUTO: 35.3 % (ref 34.8–46.1)
HGB BLD-MCNC: 10.9 G/DL (ref 11.5–15.4)
IMM GRANULOCYTES # BLD AUTO: 0.05 THOUSAND/UL (ref 0–0.2)
IMM GRANULOCYTES NFR BLD AUTO: 0 % (ref 0–2)
INR PPP: 1.12 (ref 0.84–1.19)
LYMPHOCYTES # BLD AUTO: 2.36 THOUSANDS/ΜL (ref 0.6–4.47)
LYMPHOCYTES NFR BLD AUTO: 19 % (ref 14–44)
MCH RBC QN AUTO: 26.9 PG (ref 26.8–34.3)
MCHC RBC AUTO-ENTMCNC: 30.9 G/DL (ref 31.4–37.4)
MCV RBC AUTO: 87 FL (ref 82–98)
MONOCYTES # BLD AUTO: 0.91 THOUSAND/ΜL (ref 0.17–1.22)
MONOCYTES NFR BLD AUTO: 7 % (ref 4–12)
NEUTROPHILS # BLD AUTO: 9.15 THOUSANDS/ΜL (ref 1.85–7.62)
NEUTS SEG NFR BLD AUTO: 72 % (ref 43–75)
NRBC BLD AUTO-RTO: 0 /100 WBCS
PLATELET # BLD AUTO: 446 THOUSANDS/UL (ref 149–390)
PMV BLD AUTO: 9 FL (ref 8.9–12.7)
POTASSIUM SERPL-SCNC: 4 MMOL/L (ref 3.5–5.3)
PROT SERPL-MCNC: 7.4 G/DL (ref 6.4–8.2)
PROTHROMBIN TIME: 14.2 SECONDS (ref 11.6–14.5)
RBC # BLD AUTO: 4.05 MILLION/UL (ref 3.81–5.12)
RETICS # AUTO: NORMAL 10*3/UL (ref 14097–95744)
RETICS # CALC: 1.29 % (ref 0.37–1.87)
SODIUM SERPL-SCNC: 139 MMOL/L (ref 136–145)
WBC # BLD AUTO: 12.71 THOUSAND/UL (ref 4.31–10.16)

## 2021-12-06 PROCEDURE — 82728 ASSAY OF FERRITIN: CPT

## 2021-12-06 PROCEDURE — 86850 RBC ANTIBODY SCREEN: CPT | Performed by: ORTHOPAEDIC SURGERY

## 2021-12-06 PROCEDURE — 85045 AUTOMATED RETICULOCYTE COUNT: CPT

## 2021-12-06 PROCEDURE — 36415 COLL VENOUS BLD VENIPUNCTURE: CPT

## 2021-12-06 PROCEDURE — 83550 IRON BINDING TEST: CPT

## 2021-12-06 PROCEDURE — 83540 ASSAY OF IRON: CPT

## 2021-12-06 PROCEDURE — 85610 PROTHROMBIN TIME: CPT

## 2021-12-06 PROCEDURE — 80053 COMPREHEN METABOLIC PANEL: CPT

## 2021-12-06 PROCEDURE — 83036 HEMOGLOBIN GLYCOSYLATED A1C: CPT

## 2021-12-06 PROCEDURE — 85025 COMPLETE CBC W/AUTO DIFF WBC: CPT

## 2021-12-06 PROCEDURE — 85730 THROMBOPLASTIN TIME PARTIAL: CPT

## 2021-12-06 PROCEDURE — 97530 THERAPEUTIC ACTIVITIES: CPT

## 2021-12-06 PROCEDURE — 97112 NEUROMUSCULAR REEDUCATION: CPT

## 2021-12-06 PROCEDURE — 97110 THERAPEUTIC EXERCISES: CPT

## 2021-12-06 PROCEDURE — 86900 BLOOD TYPING SEROLOGIC ABO: CPT | Performed by: ORTHOPAEDIC SURGERY

## 2021-12-06 PROCEDURE — 86901 BLOOD TYPING SEROLOGIC RH(D): CPT | Performed by: ORTHOPAEDIC SURGERY

## 2021-12-07 ENCOUNTER — LAB REQUISITION (OUTPATIENT)
Dept: LAB | Facility: HOSPITAL | Age: 61
End: 2021-12-07
Payer: COMMERCIAL

## 2021-12-07 DIAGNOSIS — Z01.818 ENCOUNTER FOR OTHER PREPROCEDURAL EXAMINATION: ICD-10-CM

## 2021-12-07 LAB
ABO GROUP BLD: NORMAL
BLD GP AB SCN SERPL QL: NEGATIVE
EST. AVERAGE GLUCOSE BLD GHB EST-MCNC: 108 MG/DL
FERRITIN SERPL-MCNC: 15 NG/ML (ref 8–388)
HBA1C MFR BLD: 5.4 %
IRON SATN MFR SERPL: 6 % (ref 15–50)
IRON SERPL-MCNC: 30 UG/DL (ref 50–170)
RH BLD: POSITIVE
SPECIMEN EXPIRATION DATE: NORMAL
TIBC SERPL-MCNC: 501 UG/DL (ref 250–450)

## 2021-12-09 ENCOUNTER — APPOINTMENT (OUTPATIENT)
Dept: PHYSICAL THERAPY | Facility: CLINIC | Age: 61
End: 2021-12-09
Payer: COMMERCIAL

## 2021-12-13 ENCOUNTER — APPOINTMENT (OUTPATIENT)
Dept: PHYSICAL THERAPY | Facility: CLINIC | Age: 61
End: 2021-12-13
Payer: COMMERCIAL

## 2021-12-21 ENCOUNTER — ANESTHESIA (OUTPATIENT)
Dept: PERIOP | Facility: HOSPITAL | Age: 61
DRG: 516 | End: 2021-12-21
Payer: COMMERCIAL

## 2021-12-21 ENCOUNTER — PATIENT OUTREACH (OUTPATIENT)
Dept: CASE MANAGEMENT | Facility: OTHER | Age: 61
End: 2021-12-21

## 2021-12-21 ENCOUNTER — APPOINTMENT (INPATIENT)
Dept: RADIOLOGY | Facility: HOSPITAL | Age: 61
DRG: 516 | End: 2021-12-21
Payer: COMMERCIAL

## 2021-12-21 ENCOUNTER — HOSPITAL ENCOUNTER (INPATIENT)
Facility: HOSPITAL | Age: 61
LOS: 1 days | Discharge: HOME | DRG: 516 | End: 2021-12-22
Attending: ORTHOPAEDIC SURGERY | Admitting: ORTHOPAEDIC SURGERY
Payer: COMMERCIAL

## 2021-12-21 DIAGNOSIS — S46.812D FULL THICKNESS TEAR OF LEFT SUBSCAPULARIS TENDON, SUBSEQUENT ENCOUNTER: ICD-10-CM

## 2021-12-21 DIAGNOSIS — Z96.612 STATUS POST TOTAL REPLACEMENT OF LEFT SHOULDER: Primary | ICD-10-CM

## 2021-12-21 LAB — GLUCOSE SERPL-MCNC: 115 MG/DL (ref 65–140)

## 2021-12-21 PROCEDURE — C1776 JOINT DEVICE (IMPLANTABLE): HCPCS | Performed by: ORTHOPAEDIC SURGERY

## 2021-12-21 PROCEDURE — 0RWK0JZ REVISION OF SYNTHETIC SUBSTITUTE IN LEFT SHOULDER JOINT, OPEN APPROACH: ICD-10-PCS | Performed by: ORTHOPAEDIC SURGERY

## 2021-12-21 PROCEDURE — C1713 ANCHOR/SCREW BN/BN,TIS/BN: HCPCS | Performed by: ORTHOPAEDIC SURGERY

## 2021-12-21 PROCEDURE — 23474 REVIS RECONST SHOULDER JOINT: CPT | Performed by: ORTHOPAEDIC SURGERY

## 2021-12-21 PROCEDURE — 82948 REAGENT STRIP/BLOOD GLUCOSE: CPT

## 2021-12-21 PROCEDURE — NC001 PR NO CHARGE: Performed by: ORTHOPAEDIC SURGERY

## 2021-12-21 PROCEDURE — 99254 IP/OBS CNSLTJ NEW/EST MOD 60: CPT | Performed by: INTERNAL MEDICINE

## 2021-12-21 PROCEDURE — 73020 X-RAY EXAM OF SHOULDER: CPT

## 2021-12-21 PROCEDURE — C9290 INJ, BUPIVACAINE LIPOSOME: HCPCS | Performed by: ANESTHESIOLOGY

## 2021-12-21 DEVICE — SCREW PERIPHERAL 5 X 18MM: Type: IMPLANTABLE DEVICE | Site: SHOULDER | Status: FUNCTIONAL

## 2021-12-21 DEVICE — SCREW PERIPHERAL 5 X 30MM: Type: IMPLANTABLE DEVICE | Site: SHOULDER | Status: FUNCTIONAL

## 2021-12-21 DEVICE — IMPLANTABLE DEVICE
Type: IMPLANTABLE DEVICE | Site: SHOULDER | Status: FUNCTIONAL
Brand: FLEX SHOULDER SYSTEM

## 2021-12-21 DEVICE — IMPLANTABLE DEVICE
Type: IMPLANTABLE DEVICE | Site: SHOULDER | Status: FUNCTIONAL
Brand: AEQUALIS™ PERFORM REVERSED

## 2021-12-21 DEVICE — SCREW CENTRAL 6.5 X 25MM: Type: IMPLANTABLE DEVICE | Site: SHOULDER | Status: FUNCTIONAL

## 2021-12-21 DEVICE — SCREW PERIPHERAL 5 X 26MM: Type: IMPLANTABLE DEVICE | Site: SHOULDER | Status: FUNCTIONAL

## 2021-12-21 DEVICE — IMPLANTABLE DEVICE
Type: IMPLANTABLE DEVICE | Site: SHOULDER | Status: FUNCTIONAL
Brand: AEQUALIS™ PERFORM+ REVERSED

## 2021-12-21 RX ORDER — SODIUM CHLORIDE, SODIUM LACTATE, POTASSIUM CHLORIDE, CALCIUM CHLORIDE 600; 310; 30; 20 MG/100ML; MG/100ML; MG/100ML; MG/100ML
125 INJECTION, SOLUTION INTRAVENOUS CONTINUOUS
Status: DISCONTINUED | OUTPATIENT
Start: 2021-12-21 | End: 2021-12-22 | Stop reason: HOSPADM

## 2021-12-21 RX ORDER — CHLORHEXIDINE GLUCONATE 0.12 MG/ML
15 RINSE ORAL ONCE
Status: COMPLETED | OUTPATIENT
Start: 2021-12-21 | End: 2021-12-21

## 2021-12-21 RX ORDER — ALBUTEROL SULFATE 2.5 MG/3ML
2.5 SOLUTION RESPIRATORY (INHALATION) ONCE AS NEEDED
Status: DISCONTINUED | OUTPATIENT
Start: 2021-12-21 | End: 2021-12-21 | Stop reason: HOSPADM

## 2021-12-21 RX ORDER — PROPOFOL 10 MG/ML
INJECTION, EMULSION INTRAVENOUS AS NEEDED
Status: DISCONTINUED | OUTPATIENT
Start: 2021-12-21 | End: 2021-12-21

## 2021-12-21 RX ORDER — MELATONIN
5000 DAILY
Status: DISCONTINUED | OUTPATIENT
Start: 2021-12-21 | End: 2021-12-22 | Stop reason: HOSPADM

## 2021-12-21 RX ORDER — ONDANSETRON 2 MG/ML
4 INJECTION INTRAMUSCULAR; INTRAVENOUS ONCE AS NEEDED
Status: DISCONTINUED | OUTPATIENT
Start: 2021-12-21 | End: 2021-12-21 | Stop reason: HOSPADM

## 2021-12-21 RX ORDER — MEPERIDINE HYDROCHLORIDE 25 MG/ML
12.5 INJECTION INTRAMUSCULAR; INTRAVENOUS; SUBCUTANEOUS ONCE
Status: DISCONTINUED | OUTPATIENT
Start: 2021-12-21 | End: 2021-12-21 | Stop reason: HOSPADM

## 2021-12-21 RX ORDER — OXYCODONE HYDROCHLORIDE 10 MG/1
10 TABLET ORAL EVERY 4 HOURS PRN
Status: DISCONTINUED | OUTPATIENT
Start: 2021-12-21 | End: 2021-12-22 | Stop reason: HOSPADM

## 2021-12-21 RX ORDER — OXYCODONE HYDROCHLORIDE 5 MG/1
TABLET ORAL
Qty: 15 TABLET | Refills: 0 | Status: SHIPPED | OUTPATIENT
Start: 2021-12-21 | End: 2022-02-28 | Stop reason: ALTCHOICE

## 2021-12-21 RX ORDER — CEFAZOLIN SODIUM 2 G/50ML
2000 SOLUTION INTRAVENOUS ONCE
Status: COMPLETED | OUTPATIENT
Start: 2021-12-21 | End: 2021-12-21

## 2021-12-21 RX ORDER — POTASSIUM CHLORIDE 20 MEQ/1
20 TABLET, EXTENDED RELEASE ORAL DAILY
Status: DISCONTINUED | OUTPATIENT
Start: 2021-12-21 | End: 2021-12-22 | Stop reason: HOSPADM

## 2021-12-21 RX ORDER — PROMETHAZINE HYDROCHLORIDE 25 MG/ML
12.5 INJECTION, SOLUTION INTRAMUSCULAR; INTRAVENOUS ONCE AS NEEDED
Status: DISCONTINUED | OUTPATIENT
Start: 2021-12-21 | End: 2021-12-21

## 2021-12-21 RX ORDER — ROCURONIUM BROMIDE 10 MG/ML
INJECTION, SOLUTION INTRAVENOUS AS NEEDED
Status: DISCONTINUED | OUTPATIENT
Start: 2021-12-21 | End: 2021-12-21

## 2021-12-21 RX ORDER — ONDANSETRON 2 MG/ML
4 INJECTION INTRAMUSCULAR; INTRAVENOUS EVERY 6 HOURS PRN
Status: DISCONTINUED | OUTPATIENT
Start: 2021-12-21 | End: 2021-12-22 | Stop reason: HOSPADM

## 2021-12-21 RX ORDER — LIDOCAINE HYDROCHLORIDE 10 MG/ML
0.5 INJECTION, SOLUTION EPIDURAL; INFILTRATION; INTRACAUDAL; PERINEURAL ONCE AS NEEDED
Status: COMPLETED | OUTPATIENT
Start: 2021-12-21 | End: 2021-12-21

## 2021-12-21 RX ORDER — GLYCOPYRROLATE 0.2 MG/ML
INJECTION INTRAMUSCULAR; INTRAVENOUS AS NEEDED
Status: DISCONTINUED | OUTPATIENT
Start: 2021-12-21 | End: 2021-12-21

## 2021-12-21 RX ORDER — MORPHINE SULFATE 10 MG/ML
2 INJECTION, SOLUTION INTRAMUSCULAR; INTRAVENOUS EVERY 2 HOUR PRN
Status: DISCONTINUED | OUTPATIENT
Start: 2021-12-21 | End: 2021-12-22 | Stop reason: HOSPADM

## 2021-12-21 RX ORDER — FERROUS SULFATE 325(65) MG
325 TABLET ORAL
Status: DISCONTINUED | OUTPATIENT
Start: 2021-12-22 | End: 2021-12-22 | Stop reason: HOSPADM

## 2021-12-21 RX ORDER — GABAPENTIN 300 MG/1
600 CAPSULE ORAL 3 TIMES DAILY
Status: DISCONTINUED | OUTPATIENT
Start: 2021-12-21 | End: 2021-12-22 | Stop reason: HOSPADM

## 2021-12-21 RX ORDER — MAGNESIUM HYDROXIDE 1200 MG/15ML
LIQUID ORAL AS NEEDED
Status: DISCONTINUED | OUTPATIENT
Start: 2021-12-21 | End: 2021-12-21 | Stop reason: HOSPADM

## 2021-12-21 RX ORDER — LORAZEPAM 1 MG/1
2 TABLET ORAL 3 TIMES DAILY
Status: DISCONTINUED | OUTPATIENT
Start: 2021-12-21 | End: 2021-12-22 | Stop reason: HOSPADM

## 2021-12-21 RX ORDER — ONDANSETRON 2 MG/ML
4 INJECTION INTRAMUSCULAR; INTRAVENOUS ONCE AS NEEDED
Status: DISCONTINUED | OUTPATIENT
Start: 2021-12-21 | End: 2021-12-21

## 2021-12-21 RX ORDER — ARIPIPRAZOLE 5 MG/1
5 TABLET ORAL DAILY
Status: DISCONTINUED | OUTPATIENT
Start: 2021-12-21 | End: 2021-12-22 | Stop reason: HOSPADM

## 2021-12-21 RX ORDER — LAMOTRIGINE 100 MG/1
200 TABLET ORAL DAILY
Status: DISCONTINUED | OUTPATIENT
Start: 2021-12-21 | End: 2021-12-22 | Stop reason: HOSPADM

## 2021-12-21 RX ORDER — CALCIUM CARBONATE 200(500)MG
1000 TABLET,CHEWABLE ORAL DAILY PRN
Status: DISCONTINUED | OUTPATIENT
Start: 2021-12-21 | End: 2021-12-22 | Stop reason: HOSPADM

## 2021-12-21 RX ORDER — DOCUSATE SODIUM 100 MG/1
100 CAPSULE, LIQUID FILLED ORAL 2 TIMES DAILY
Status: DISCONTINUED | OUTPATIENT
Start: 2021-12-21 | End: 2021-12-22 | Stop reason: HOSPADM

## 2021-12-21 RX ORDER — FENTANYL CITRATE 50 UG/ML
INJECTION, SOLUTION INTRAMUSCULAR; INTRAVENOUS AS NEEDED
Status: DISCONTINUED | OUTPATIENT
Start: 2021-12-21 | End: 2021-12-21

## 2021-12-21 RX ORDER — SODIUM CHLORIDE, SODIUM LACTATE, POTASSIUM CHLORIDE, CALCIUM CHLORIDE 600; 310; 30; 20 MG/100ML; MG/100ML; MG/100ML; MG/100ML
100 INJECTION, SOLUTION INTRAVENOUS CONTINUOUS
Status: DISPENSED | OUTPATIENT
Start: 2021-12-21 | End: 2021-12-22

## 2021-12-21 RX ORDER — HYDRALAZINE HYDROCHLORIDE 25 MG/1
25 TABLET, FILM COATED ORAL EVERY 8 HOURS PRN
Status: DISCONTINUED | OUTPATIENT
Start: 2021-12-21 | End: 2021-12-22 | Stop reason: HOSPADM

## 2021-12-21 RX ORDER — MECLIZINE HCL 12.5 MG/1
12.5 TABLET ORAL EVERY 8 HOURS PRN
Status: DISCONTINUED | OUTPATIENT
Start: 2021-12-21 | End: 2021-12-22 | Stop reason: HOSPADM

## 2021-12-21 RX ORDER — LIDOCAINE HYDROCHLORIDE 10 MG/ML
INJECTION, SOLUTION EPIDURAL; INFILTRATION; INTRACAUDAL; PERINEURAL AS NEEDED
Status: DISCONTINUED | OUTPATIENT
Start: 2021-12-21 | End: 2021-12-21

## 2021-12-21 RX ORDER — BUPIVACAINE HYDROCHLORIDE 5 MG/ML
INJECTION, SOLUTION PERINEURAL
Status: COMPLETED | OUTPATIENT
Start: 2021-12-21 | End: 2021-12-21

## 2021-12-21 RX ORDER — CEFAZOLIN SODIUM 2 G/50ML
2000 SOLUTION INTRAVENOUS EVERY 8 HOURS
Status: COMPLETED | OUTPATIENT
Start: 2021-12-21 | End: 2021-12-22

## 2021-12-21 RX ORDER — AMLODIPINE BESYLATE 2.5 MG/1
2.5 TABLET ORAL
Status: DISCONTINUED | OUTPATIENT
Start: 2021-12-21 | End: 2021-12-21

## 2021-12-21 RX ORDER — ACETAMINOPHEN 325 MG/1
650 TABLET ORAL EVERY 6 HOURS PRN
Status: DISCONTINUED | OUTPATIENT
Start: 2021-12-21 | End: 2021-12-22 | Stop reason: HOSPADM

## 2021-12-21 RX ORDER — OXYCODONE HYDROCHLORIDE 5 MG/1
5 TABLET ORAL EVERY 4 HOURS PRN
Status: DISCONTINUED | OUTPATIENT
Start: 2021-12-21 | End: 2021-12-22 | Stop reason: HOSPADM

## 2021-12-21 RX ORDER — ONDANSETRON 2 MG/ML
INJECTION INTRAMUSCULAR; INTRAVENOUS AS NEEDED
Status: DISCONTINUED | OUTPATIENT
Start: 2021-12-21 | End: 2021-12-21

## 2021-12-21 RX ORDER — PROMETHAZINE HYDROCHLORIDE 25 MG/ML
12.5 INJECTION, SOLUTION INTRAMUSCULAR; INTRAVENOUS ONCE AS NEEDED
Status: DISCONTINUED | OUTPATIENT
Start: 2021-12-21 | End: 2021-12-21 | Stop reason: HOSPADM

## 2021-12-21 RX ORDER — LABETALOL 20 MG/4 ML (5 MG/ML) INTRAVENOUS SYRINGE
5
Status: DISCONTINUED | OUTPATIENT
Start: 2021-12-21 | End: 2021-12-21 | Stop reason: HOSPADM

## 2021-12-21 RX ORDER — AMLODIPINE BESYLATE 2.5 MG/1
2.5 TABLET ORAL
Status: DISCONTINUED | OUTPATIENT
Start: 2021-12-21 | End: 2021-12-22 | Stop reason: HOSPADM

## 2021-12-21 RX ORDER — PANTOPRAZOLE SODIUM 40 MG/1
40 TABLET, DELAYED RELEASE ORAL
Status: DISCONTINUED | OUTPATIENT
Start: 2021-12-22 | End: 2021-12-22 | Stop reason: HOSPADM

## 2021-12-21 RX ORDER — ACETAMINOPHEN 500 MG
500 TABLET ORAL EVERY 6 HOURS PRN
Status: DISCONTINUED | OUTPATIENT
Start: 2021-12-21 | End: 2021-12-21 | Stop reason: SDUPTHER

## 2021-12-21 RX ORDER — NEOSTIGMINE METHYLSULFATE 1 MG/ML
INJECTION INTRAVENOUS AS NEEDED
Status: DISCONTINUED | OUTPATIENT
Start: 2021-12-21 | End: 2021-12-21

## 2021-12-21 RX ORDER — MIDAZOLAM HYDROCHLORIDE 2 MG/2ML
INJECTION, SOLUTION INTRAMUSCULAR; INTRAVENOUS AS NEEDED
Status: DISCONTINUED | OUTPATIENT
Start: 2021-12-21 | End: 2021-12-21

## 2021-12-21 RX ORDER — HYDROMORPHONE HCL/PF 1 MG/ML
0.5 SYRINGE (ML) INJECTION
Status: COMPLETED | OUTPATIENT
Start: 2021-12-21 | End: 2021-12-21

## 2021-12-21 RX ORDER — FENTANYL CITRATE/PF 50 MCG/ML
25 SYRINGE (ML) INJECTION
Status: COMPLETED | OUTPATIENT
Start: 2021-12-21 | End: 2021-12-21

## 2021-12-21 RX ADMIN — SODIUM CHLORIDE, SODIUM LACTATE, POTASSIUM CHLORIDE, AND CALCIUM CHLORIDE: .6; .31; .03; .02 INJECTION, SOLUTION INTRAVENOUS at 09:50

## 2021-12-21 RX ADMIN — ONDANSETRON 4 MG: 2 INJECTION INTRAMUSCULAR; INTRAVENOUS at 11:00

## 2021-12-21 RX ADMIN — GABAPENTIN 600 MG: 300 CAPSULE ORAL at 16:32

## 2021-12-21 RX ADMIN — CHLORHEXIDINE GLUCONATE 15 ML: 1.2 SOLUTION ORAL at 08:00

## 2021-12-21 RX ADMIN — Medication 25 MCG: at 11:49

## 2021-12-21 RX ADMIN — OXYCODONE HYDROCHLORIDE 10 MG: 10 TABLET ORAL at 14:40

## 2021-12-21 RX ADMIN — BUPIVACAINE HYDROCHLORIDE 5 ML: 5 INJECTION, SOLUTION PERINEURAL at 08:50

## 2021-12-21 RX ADMIN — PHENYLEPHRINE HYDROCHLORIDE 30 MCG/MIN: 10 INJECTION INTRAVENOUS at 10:00

## 2021-12-21 RX ADMIN — LORAZEPAM 2 MG: 1 TABLET ORAL at 21:00

## 2021-12-21 RX ADMIN — OXYCODONE HYDROCHLORIDE 10 MG: 10 TABLET ORAL at 19:46

## 2021-12-21 RX ADMIN — CEFAZOLIN SODIUM 2000 MG: 2 SOLUTION INTRAVENOUS at 09:42

## 2021-12-21 RX ADMIN — CEFAZOLIN SODIUM 2000 MG: 2 SOLUTION INTRAVENOUS at 17:18

## 2021-12-21 RX ADMIN — Medication 25 MCG: at 11:55

## 2021-12-21 RX ADMIN — PROPOFOL 150 MG: 10 INJECTION, EMULSION INTRAVENOUS at 09:50

## 2021-12-21 RX ADMIN — HYDROMORPHONE HYDROCHLORIDE 0.5 MG: 1 INJECTION, SOLUTION INTRAMUSCULAR; INTRAVENOUS; SUBCUTANEOUS at 12:17

## 2021-12-21 RX ADMIN — LIDOCAINE HYDROCHLORIDE 0.5 ML: 10 INJECTION, SOLUTION EPIDURAL; INFILTRATION; INTRACAUDAL; PERINEURAL at 08:14

## 2021-12-21 RX ADMIN — SODIUM CHLORIDE, SODIUM LACTATE, POTASSIUM CHLORIDE, AND CALCIUM CHLORIDE 100 ML/HR: .6; .31; .03; .02 INJECTION, SOLUTION INTRAVENOUS at 12:59

## 2021-12-21 RX ADMIN — SODIUM CHLORIDE, SODIUM LACTATE, POTASSIUM CHLORIDE, AND CALCIUM CHLORIDE 125 ML/HR: .6; .31; .03; .02 INJECTION, SOLUTION INTRAVENOUS at 08:14

## 2021-12-21 RX ADMIN — HYDROMORPHONE HYDROCHLORIDE 0.5 MG: 1 INJECTION, SOLUTION INTRAMUSCULAR; INTRAVENOUS; SUBCUTANEOUS at 12:35

## 2021-12-21 RX ADMIN — Medication 5000 UNITS: at 16:33

## 2021-12-21 RX ADMIN — LORAZEPAM 2 MG: 1 TABLET ORAL at 17:19

## 2021-12-21 RX ADMIN — Medication 25 MCG: at 11:45

## 2021-12-21 RX ADMIN — MIDAZOLAM 1 MG: 1 INJECTION INTRAMUSCULAR; INTRAVENOUS at 08:48

## 2021-12-21 RX ADMIN — NEOSTIGMINE METHYLSULFATE 3 MG: 1 INJECTION INTRAVENOUS at 11:05

## 2021-12-21 RX ADMIN — HYDROMORPHONE HYDROCHLORIDE 0.5 MG: 1 INJECTION, SOLUTION INTRAMUSCULAR; INTRAVENOUS; SUBCUTANEOUS at 12:44

## 2021-12-21 RX ADMIN — PROPOFOL 50 MG: 10 INJECTION, EMULSION INTRAVENOUS at 09:53

## 2021-12-21 RX ADMIN — ROCURONIUM BROMIDE 50 MG: 50 INJECTION, SOLUTION INTRAVENOUS at 09:50

## 2021-12-21 RX ADMIN — Medication 25 MCG: at 11:42

## 2021-12-21 RX ADMIN — BUPIVACAINE 20 ML: 13.3 INJECTION, SUSPENSION, LIPOSOMAL INFILTRATION at 08:50

## 2021-12-21 RX ADMIN — POTASSIUM CHLORIDE 20 MEQ: 1500 TABLET, EXTENDED RELEASE ORAL at 17:19

## 2021-12-21 RX ADMIN — HYDROMORPHONE HYDROCHLORIDE 0.5 MG: 1 INJECTION, SOLUTION INTRAMUSCULAR; INTRAVENOUS; SUBCUTANEOUS at 12:05

## 2021-12-21 RX ADMIN — LIDOCAINE HYDROCHLORIDE 50 MG: 10 INJECTION, SOLUTION EPIDURAL; INFILTRATION; INTRACAUDAL; PERINEURAL at 09:50

## 2021-12-21 RX ADMIN — FENTANYL CITRATE 50 MCG: 50 INJECTION INTRAMUSCULAR; INTRAVENOUS at 08:48

## 2021-12-21 RX ADMIN — GLYCOPYRROLATE 0.4 MG: 0.2 INJECTION, SOLUTION INTRAMUSCULAR; INTRAVENOUS at 11:05

## 2021-12-21 RX ADMIN — GABAPENTIN 600 MG: 300 CAPSULE ORAL at 21:00

## 2021-12-22 ENCOUNTER — PATIENT OUTREACH (OUTPATIENT)
Dept: CASE MANAGEMENT | Facility: OTHER | Age: 61
End: 2021-12-22

## 2021-12-22 VITALS
TEMPERATURE: 97.9 F | BODY MASS INDEX: 37.89 KG/M2 | SYSTOLIC BLOOD PRESSURE: 117 MMHG | OXYGEN SATURATION: 94 % | WEIGHT: 250 LBS | HEART RATE: 83 BPM | DIASTOLIC BLOOD PRESSURE: 61 MMHG | RESPIRATION RATE: 18 BRPM | HEIGHT: 68 IN

## 2021-12-22 PROBLEM — Z96.612 S/P REVERSE TOTAL SHOULDER ARTHROPLASTY, LEFT: Status: ACTIVE | Noted: 2021-12-22

## 2021-12-22 LAB
ANION GAP SERPL CALCULATED.3IONS-SCNC: 4 MMOL/L (ref 4–13)
BUN SERPL-MCNC: 15 MG/DL (ref 5–25)
CALCIUM SERPL-MCNC: 8.3 MG/DL (ref 8.3–10.1)
CHLORIDE SERPL-SCNC: 106 MMOL/L (ref 100–108)
CO2 SERPL-SCNC: 28 MMOL/L (ref 21–32)
CREAT SERPL-MCNC: 0.82 MG/DL (ref 0.6–1.3)
ERYTHROCYTE [DISTWIDTH] IN BLOOD BY AUTOMATED COUNT: 16.7 % (ref 11.6–15.1)
GFR SERPL CREATININE-BSD FRML MDRD: 76 ML/MIN/1.73SQ M
GLUCOSE SERPL-MCNC: 139 MG/DL (ref 65–140)
HCT VFR BLD AUTO: 31 % (ref 34.8–46.1)
HGB BLD-MCNC: 9.7 G/DL (ref 11.5–15.4)
MCH RBC QN AUTO: 27.5 PG (ref 26.8–34.3)
MCHC RBC AUTO-ENTMCNC: 31.3 G/DL (ref 31.4–37.4)
MCV RBC AUTO: 88 FL (ref 82–98)
PLATELET # BLD AUTO: 418 THOUSANDS/UL (ref 149–390)
PMV BLD AUTO: 9.4 FL (ref 8.9–12.7)
POTASSIUM SERPL-SCNC: 3.6 MMOL/L (ref 3.5–5.3)
RBC # BLD AUTO: 3.53 MILLION/UL (ref 3.81–5.12)
SODIUM SERPL-SCNC: 138 MMOL/L (ref 136–145)
WBC # BLD AUTO: 14.48 THOUSAND/UL (ref 4.31–10.16)

## 2021-12-22 PROCEDURE — 97167 OT EVAL HIGH COMPLEX 60 MIN: CPT

## 2021-12-22 PROCEDURE — 99232 SBSQ HOSP IP/OBS MODERATE 35: CPT | Performed by: INTERNAL MEDICINE

## 2021-12-22 PROCEDURE — 80048 BASIC METABOLIC PNL TOTAL CA: CPT | Performed by: PHYSICIAN ASSISTANT

## 2021-12-22 PROCEDURE — 97162 PT EVAL MOD COMPLEX 30 MIN: CPT

## 2021-12-22 PROCEDURE — 85027 COMPLETE CBC AUTOMATED: CPT | Performed by: PHYSICIAN ASSISTANT

## 2021-12-22 PROCEDURE — 97535 SELF CARE MNGMENT TRAINING: CPT

## 2021-12-22 PROCEDURE — 99024 POSTOP FOLLOW-UP VISIT: CPT | Performed by: PHYSICIAN ASSISTANT

## 2021-12-22 PROCEDURE — NC001 PR NO CHARGE: Performed by: ORTHOPAEDIC SURGERY

## 2021-12-22 RX ADMIN — SODIUM CHLORIDE, SODIUM LACTATE, POTASSIUM CHLORIDE, AND CALCIUM CHLORIDE 100 ML/HR: .6; .31; .03; .02 INJECTION, SOLUTION INTRAVENOUS at 02:33

## 2021-12-22 RX ADMIN — ARIPIPRAZOLE 5 MG: 5 TABLET ORAL at 08:49

## 2021-12-22 RX ADMIN — GABAPENTIN 600 MG: 300 CAPSULE ORAL at 08:47

## 2021-12-22 RX ADMIN — POTASSIUM CHLORIDE 20 MEQ: 1500 TABLET, EXTENDED RELEASE ORAL at 08:47

## 2021-12-22 RX ADMIN — LORAZEPAM 2 MG: 1 TABLET ORAL at 08:47

## 2021-12-22 RX ADMIN — MECLIZINE HCL 12.5 MG 12.5 MG: 12.5 TABLET ORAL at 09:37

## 2021-12-22 RX ADMIN — FERROUS SULFATE TAB 325 MG (65 MG ELEMENTAL FE) 325 MG: 325 (65 FE) TAB at 07:03

## 2021-12-22 RX ADMIN — PANTOPRAZOLE SODIUM 40 MG: 40 TABLET, DELAYED RELEASE ORAL at 05:41

## 2021-12-22 RX ADMIN — DOCUSATE SODIUM 100 MG: 100 CAPSULE ORAL at 08:48

## 2021-12-22 RX ADMIN — IRON SUCROSE 200 MG: 20 INJECTION, SOLUTION INTRAVENOUS at 08:43

## 2021-12-22 RX ADMIN — OXYCODONE HYDROCHLORIDE 10 MG: 10 TABLET ORAL at 02:32

## 2021-12-22 RX ADMIN — APIXABAN 5 MG: 5 TABLET, FILM COATED ORAL at 09:35

## 2021-12-22 RX ADMIN — CEFAZOLIN SODIUM 2000 MG: 2 SOLUTION INTRAVENOUS at 01:07

## 2021-12-22 RX ADMIN — Medication 5000 UNITS: at 08:47

## 2021-12-22 RX ADMIN — LAMOTRIGINE 200 MG: 100 TABLET ORAL at 08:47

## 2021-12-22 RX ADMIN — OXYCODONE HYDROCHLORIDE 10 MG: 10 TABLET ORAL at 07:03

## 2021-12-22 RX ADMIN — OXYCODONE HYDROCHLORIDE 10 MG: 10 TABLET ORAL at 11:13

## 2021-12-27 ENCOUNTER — OFFICE VISIT (OUTPATIENT)
Dept: PHYSICAL THERAPY | Facility: CLINIC | Age: 61
End: 2021-12-27
Payer: COMMERCIAL

## 2021-12-27 ENCOUNTER — TELEPHONE (OUTPATIENT)
Dept: OBGYN CLINIC | Facility: HOSPITAL | Age: 61
End: 2021-12-27

## 2021-12-27 DIAGNOSIS — S46.812D RUPTURE OF SUBSCAPULARIS TENDON, LEFT, SUBSEQUENT ENCOUNTER: ICD-10-CM

## 2021-12-27 DIAGNOSIS — Z96.612 PRESENCE OF LEFT ARTIFICIAL SHOULDER JOINT: Primary | ICD-10-CM

## 2021-12-27 PROCEDURE — 97162 PT EVAL MOD COMPLEX 30 MIN: CPT

## 2021-12-27 PROCEDURE — 97530 THERAPEUTIC ACTIVITIES: CPT

## 2021-12-28 ENCOUNTER — PATIENT OUTREACH (OUTPATIENT)
Dept: CASE MANAGEMENT | Facility: OTHER | Age: 61
End: 2021-12-28

## 2021-12-30 ENCOUNTER — OFFICE VISIT (OUTPATIENT)
Dept: PHYSICAL THERAPY | Facility: CLINIC | Age: 61
End: 2021-12-30
Payer: COMMERCIAL

## 2021-12-30 DIAGNOSIS — S46.812D RUPTURE OF SUBSCAPULARIS TENDON, LEFT, SUBSEQUENT ENCOUNTER: ICD-10-CM

## 2021-12-30 DIAGNOSIS — Z96.612 PRESENCE OF LEFT ARTIFICIAL SHOULDER JOINT: Primary | ICD-10-CM

## 2021-12-30 DIAGNOSIS — M19.012 PRIMARY OSTEOARTHRITIS OF LEFT SHOULDER: ICD-10-CM

## 2021-12-30 PROCEDURE — 97530 THERAPEUTIC ACTIVITIES: CPT

## 2021-12-30 PROCEDURE — 97112 NEUROMUSCULAR REEDUCATION: CPT

## 2021-12-30 PROCEDURE — 97140 MANUAL THERAPY 1/> REGIONS: CPT

## 2022-01-03 ENCOUNTER — APPOINTMENT (OUTPATIENT)
Dept: PHYSICAL THERAPY | Facility: CLINIC | Age: 62
End: 2022-01-03
Payer: COMMERCIAL

## 2022-01-03 ENCOUNTER — APPOINTMENT (OUTPATIENT)
Dept: RADIOLOGY | Facility: OTHER | Age: 62
End: 2022-01-03
Payer: COMMERCIAL

## 2022-01-03 ENCOUNTER — OFFICE VISIT (OUTPATIENT)
Dept: OBGYN CLINIC | Facility: OTHER | Age: 62
End: 2022-01-03

## 2022-01-03 VITALS
SYSTOLIC BLOOD PRESSURE: 127 MMHG | WEIGHT: 247 LBS | HEART RATE: 98 BPM | HEIGHT: 68 IN | DIASTOLIC BLOOD PRESSURE: 83 MMHG | BODY MASS INDEX: 37.44 KG/M2

## 2022-01-03 DIAGNOSIS — Z96.612 AFTERCARE FOLLOWING LEFT SHOULDER JOINT REPLACEMENT SURGERY: Primary | ICD-10-CM

## 2022-01-03 DIAGNOSIS — Z96.612 AFTERCARE FOLLOWING LEFT SHOULDER JOINT REPLACEMENT SURGERY: ICD-10-CM

## 2022-01-03 DIAGNOSIS — Z47.1 AFTERCARE FOLLOWING LEFT SHOULDER JOINT REPLACEMENT SURGERY: ICD-10-CM

## 2022-01-03 DIAGNOSIS — Z47.1 AFTERCARE FOLLOWING LEFT SHOULDER JOINT REPLACEMENT SURGERY: Primary | ICD-10-CM

## 2022-01-03 PROCEDURE — 73030 X-RAY EXAM OF SHOULDER: CPT

## 2022-01-03 PROCEDURE — 99024 POSTOP FOLLOW-UP VISIT: CPT | Performed by: PHYSICIAN ASSISTANT

## 2022-01-03 NOTE — PROGRESS NOTES
Assessment:       1  Aftercare following left shoulder joint replacement surgery          Plan:        Patient is doing well postoperatively  Operative note, images, and reverse total shoulder rehab protocol were discussed  All questions were addressed to the patient's satisfaction  Patient has an appointment with PT  we discussed prophylactic antibiotics prior to dental surgery  Follow-up will be in 6 weeks to assess patient's progress  Subjective:     Patient ID: Amelia Meigs is a 59 y o  female  Chief Complaint:    HPI     Patient presents to the office for follow-up status post revision left reverse total shoulder arthroplasty on 2021  She reports her preoperative pain is much improved  She has started with physical therapy and has no new concerns  Social History     Occupational History    Not on file   Tobacco Use    Smoking status: Former Smoker     Packs/day: 0 20     Years: 20 00     Pack years: 4 00     Types: Cigarettes     Start date:      Quit date:      Years since quittin 0    Smokeless tobacco: Never Used    Tobacco comment: quit   Vaping Use    Vaping Use: Never used   Substance and Sexual Activity    Alcohol use: Yes     Alcohol/week: 2 0 standard drinks     Types: 1 Glasses of wine, 1 Standard drinks or equivalent per week     Comment: occasionally    Drug use: Never     Comment: na    Sexual activity: Not on file     Comment: defer      Review of Systems   Constitutional: Negative  Respiratory: Negative  Musculoskeletal: Positive for myalgias  Negative for arthralgias  Skin: Positive for wound  Neurological: Positive for weakness  Negative for numbness  Psychiatric/Behavioral: Negative  Objective:     Ortho ExamPhysical Exam  HENT:      Head: Atraumatic  Cardiovascular:      Pulses: Normal pulses     Pulmonary:      Effort: Pulmonary effort is normal    Musculoskeletal:      Comments: Painless circumduction   Skin: General: Skin is warm and dry  Capillary Refill: Capillary refill takes less than 2 seconds  Comments: Surgical incision dry and clean  Staples removed, Steri-Strips applied  Neurological:      Mental Status: She is alert and oriented to person, place, and time  Sensory: No sensory deficit  Psychiatric:         Mood and Affect: Mood normal          Behavior: Behavior normal            I have personally reviewed pertinent films in PACS and my interpretation is Consistent with reverse total shoulder arthroplasty  Prosthesis in excellent position  Kiki Booth

## 2022-01-04 ENCOUNTER — OFFICE VISIT (OUTPATIENT)
Dept: PHYSICAL THERAPY | Facility: CLINIC | Age: 62
End: 2022-01-04
Payer: COMMERCIAL

## 2022-01-04 DIAGNOSIS — S46.812D RUPTURE OF SUBSCAPULARIS TENDON, LEFT, SUBSEQUENT ENCOUNTER: ICD-10-CM

## 2022-01-04 DIAGNOSIS — M19.012 PRIMARY OSTEOARTHRITIS OF LEFT SHOULDER: ICD-10-CM

## 2022-01-04 DIAGNOSIS — Z96.612 PRESENCE OF LEFT ARTIFICIAL SHOULDER JOINT: Primary | ICD-10-CM

## 2022-01-04 PROCEDURE — 97112 NEUROMUSCULAR REEDUCATION: CPT

## 2022-01-04 PROCEDURE — 97530 THERAPEUTIC ACTIVITIES: CPT

## 2022-01-04 PROCEDURE — 97140 MANUAL THERAPY 1/> REGIONS: CPT

## 2022-01-04 PROCEDURE — 97110 THERAPEUTIC EXERCISES: CPT

## 2022-01-04 NOTE — PROGRESS NOTES
Daily Note     Today's date: 2022  Patient name: Travon Freeman  : 1960  MRN: [id number]  Referring provider: Luke Smith PA-C  Dx:   Encounter Diagnosis     ICD-10-CM    1  Presence of left artificial shoulder joint  Z96 612    2  Rupture of subscapularis tendon, left, subsequent encounter  S46 812D    3  Primary osteoarthritis of left shoulder  M19 012        Start Time: 1505  Stop Time: 1607  Total time in clinic (min): 62 minutes  Subjective: Pt reports she was able to get herself dressed today - noting it took almost an hour, but she was happy to complete it  Pt notes that at f/u /c ortho yesterday they said she could DC from the sling in 2 weeks () which is 1 day shy of Post Op Date 4 weeks  Objective: See treatment diary below  Pt is currently Post OP Week 2 Day 1  Assessment: Tolerated treatment well  Pt cont to demonstrate limited T/S mobility during rot + ext exercises as well as limited cervical mobility during AROM exercises  Provided pt /c HEP and discussed performing some variation of exercises + stretches daily in order to support progress in OPPT  Pt demonstrated fatigue post treatment, exhibited good technique with therapeutic exercises and would benefit from continued PT to progress cervicothoracic ROM to improve pt's tolerance to sitting and standing daily as when performing self-care  Plan: Cont /c PT POC  Progress as tolerated  Precautions: (L) 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 (protocol); (L) TSR 2021;   HTN; syncope; hx DVT; Bipolar disorder; LBP; C/S radiculopathy; chronic pain disorder; fibromyalgia; hx TIA's; migraines; stroke    Date           FOTO IE             Re-eval IE                Manuals           (L) Shoulder PROM Weeks 1-2: Flex firm end-feel, ER 0-30 nv AL           Week 2-6: Flex 120, ER first end-feel (<50 deg at all times)   SP          Cervical PROM   SP                       Neuro Re-Ed  01/04          Iso scap dep in sling             Iso scap ret in sling  20x3" 5"x20HEP          Seated C/S retractions nv 15x3" 5"x20HEP          Slouch + overcorrect nv 10x5" 5"x20HEP          Deltoid Iso's nv 10x5" ea                                     Ther Ex 12/27 12/30 01/04          T/S ext over chair nv  2'          Seated T/S rotation  nv 3' 2'           UT (S) nv 5x10" 10"x10 (L)  HEP          LS (S)   HEP          C/S rotation AROM nv   HEP          Digi flex nv 2' ea 5" red Red 2'ea 5" lumbr HEP          Pendulums nv            Seated ER PROM w/ cane 0-30 Weeks 1-2                                       Ther Activity 12/27 12/30 01/04          Recumbent bike, gentle nv nv                                     Pt Edu 15' AL procotol, dx, dx, goals AL SP                                    Modalities 12/27 12/30 01/04          Ice, prn  10' 10'                           Edwin Cano, PTA

## 2022-01-05 NOTE — PROGRESS NOTES
Outpatient Care Management   Outreach #1: Unable to reach patient to follow up after most recent admission  Will continue to follow & make another outreach attempt

## 2022-01-06 ENCOUNTER — OFFICE VISIT (OUTPATIENT)
Dept: PHYSICAL THERAPY | Facility: CLINIC | Age: 62
End: 2022-01-06
Payer: COMMERCIAL

## 2022-01-06 DIAGNOSIS — Z96.612 PRESENCE OF LEFT ARTIFICIAL SHOULDER JOINT: Primary | ICD-10-CM

## 2022-01-06 DIAGNOSIS — M19.012 PRIMARY OSTEOARTHRITIS OF LEFT SHOULDER: ICD-10-CM

## 2022-01-06 DIAGNOSIS — S46.812D RUPTURE OF SUBSCAPULARIS TENDON, LEFT, SUBSEQUENT ENCOUNTER: ICD-10-CM

## 2022-01-06 PROCEDURE — 97530 THERAPEUTIC ACTIVITIES: CPT

## 2022-01-06 PROCEDURE — 97140 MANUAL THERAPY 1/> REGIONS: CPT

## 2022-01-06 PROCEDURE — 97110 THERAPEUTIC EXERCISES: CPT

## 2022-01-06 NOTE — PROGRESS NOTES
Daily Note     Today's date: 2022  Patient name: Misty Hester  : 1960  MRN: [id number]  Referring provider: Reilly Lockwood PA-C  Dx:   Encounter Diagnosis     ICD-10-CM    1  Presence of left artificial shoulder joint  Z96 612    2  Rupture of subscapularis tendon, left, subsequent encounter  S46 072D    3  Primary osteoarthritis of left shoulder  M19 012                   Subjective: Pt states that her shoulder is continuing to do well  Pt reports compliance with HEP issued LV however states that she has a few questions about how she is doing two of the exercises  Pt states that she was able to get dressed today with improved ability however requires assist when donning bra and donning/doffing jacket  Objective: See treatment diary below  Pt is in Week 2, Day 2 of protocol  Assessment: Tolerated treatment well  Added a couple of exercises today to progress AAROM flex given that pt has now entered Phase II of protocol  Pt able to complete additions without adverse reaction  VC provided during table slide flex to reduce UT compensation - form improved after cuing  Educated pt today about DOMs; goals/precautions of Phase II of protocol; and proper form of C/S retractions and LS stretch  Patient demonstrated fatigue post treatment, exhibited good technique with therapeutic exercises and would benefit from continued PT to progress UE ROM to improve pt's tolerance to reaching daily as when getting dressed  Plan: Continue per plan of care  Progress treament per protocol  Precautions: (L) 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 (protocol); (L) TSR 2021;   HTN; syncope; hx DVT; Bipolar disorder; LBP; C/S radiculopathy; chronic pain disorder; fibromyalgia; hx TIA's; migraines; stroke    Date          FOTO IE             Re-eval IE                Manuals          (L) Shoulder PROM Weeks 1-2: Flex firm end-feel, ER 0-30 nv AL           Week 2-6: Flex 120, ER first end-feel (<50 deg at all times)   SP AL         Cervical PROM   SP                       Neuro Re-Ed 12/27 12/30 01/04 1/6         Iso scap dep in sling             Iso scap ret in sling  20x3" 5"x20HEP          Seated C/S retractions nv 15x3" 5"x20HEP 20x5"         Slouch + overcorrect nv 10x5" 5"x20HEP          Deltoid Iso's nv 10x5" ea  10x5" ea                                   Ther Ex 12/27 12/30 01/04 1/6         T/S ext over chair nv  2'          Seated T/S rotation  nv 3' 2'           UT (S) nv 5x10" 10"x10 (L)  HEP          LS (S)   HEP 5x10" ea         C/S rotation AROM nv   HEP          Digi flex nv 2' ea 5" red Red 2'ea 5" lumbr HEP Red 2' ea 5" lumbr         Pendulums nv            Seated ER PROM w/ cane 0-50    10x5"         Supine cane flex    2x10         Supine IR/ER AROM             SL ER             SL Flex             SL Abd                           Ther Activity 12/27 12/30 01/04 1/6         Recumbent bike, gentle nv nv           Pulleys    3'         Table slides - flex, scap    2' ea         Seated SWB flex    nv         Finger ladder    nv         Pt Edu 15' AL procotol, dx, dx, goals AL SP AL                                   Modalities 12/27 12/30 01/04 1/6         Ice, prn  10' 10' 10'

## 2022-01-10 ENCOUNTER — OFFICE VISIT (OUTPATIENT)
Dept: PHYSICAL THERAPY | Facility: CLINIC | Age: 62
End: 2022-01-10
Payer: COMMERCIAL

## 2022-01-10 DIAGNOSIS — Z96.612 PRESENCE OF LEFT ARTIFICIAL SHOULDER JOINT: Primary | ICD-10-CM

## 2022-01-10 PROCEDURE — 97530 THERAPEUTIC ACTIVITIES: CPT

## 2022-01-10 PROCEDURE — 97140 MANUAL THERAPY 1/> REGIONS: CPT

## 2022-01-10 PROCEDURE — 97110 THERAPEUTIC EXERCISES: CPT

## 2022-01-10 NOTE — PROGRESS NOTES
Daily Note     Today's date: 1/10/2022  Patient name: Jennie Pulido  : 1960  MRN: [id number]  Referring provider: Christy Molina PA-C  Dx:   Encounter Diagnosis     ICD-10-CM    1  Presence of left artificial shoulder joint  Z96 612                   Subjective: Pt states that her shoulder is feeling pretty good today  She was able to shower and get dressed today without assistance - it took approx 45' to shower and 30' to get dressed  She was a little sore for about 1 day following new exercises LV  Pt states that her cat walked along her incision while sleeping the other night  Her incision seems fine though  Pt also adds that she somehow woke up this AM without her sling on with the (L) shoulder in abduction - she does not recall consciously removing the sling last night  She is a little more sore this AM as a result  Objective: See treatment diary below  Inspected pt's incision - scar is approximating well, most steristrips are still present  There is one small area of scabbing  No discharge/bleeding at this time  Assessment: Tolerated treatment well  Pt with difficulty controlling eccentric lowering during finger ladder flex d/t fatigue therefore began with only 5 reps  Pt c/o slightly elevated pain levels during today's tx session given recent subjective reports; held significant progressions as a result  Educated pt about normative time frames for soft tissue healing; current precautions; and goals of new exercises  Patient demonstrated fatigue post treatment, exhibited good technique with therapeutic exercises and would benefit from continued PT to progress [address] ROM and gentle strength to improve pt's tolerance to getting dressed and performing self care daily  Plan: Continue per plan of care  Progress treament per protocol  Precautions: (L) 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 (protocol); (L) TSR 2021;   HTN; syncope; hx DVT;  Bipolar disorder; LBP; C/S radiculopathy; chronic pain disorder; fibromyalgia; hx TIA's; migraines; stroke    Date 12/27 12/30 01/04 1/6 1/10        FOTO IE     xx        Re-eval IE                Manuals 12/27 12/30 01/04 1/6 1/10        (L) Shoulder PROM Weeks 1-2: Flex firm end-feel, ER 0-30 nv AL           Week 2-6: Flex 120, ER first end-feel (<50 deg at all times)   SP AL AL        Cervical PROM   SP                       Neuro Re-Ed 12/27 12/30 01/04 1/6 1/10        Iso scap dep in sling             Iso scap ret in sling  20x3" 5"x20HEP          Seated C/S retractions nv 15x3" 5"x20HEP 20x5"         Slouch + overcorrect nv 10x5" 5"x20HEP          Deltoid Iso's nv 10x5" ea  10x5" ea                                   Ther Ex 12/27 12/30 01/04 1/6 1/10        T/S ext over chair nv  2'          Seated T/S rotation  nv 3' 2'           UT (S) nv 5x10" 10"x10 (L)  HEP          LS (S)   HEP 5x10" ea         C/S rotation AROM nv   HEP          Digi flex nv 2' ea 5" red Red 2'ea 5" lumbr HEP Red 2' ea 5" lumbr         Pendulums nv            Seated ER PROM w/ cane 0-50    10x5" 15x5"        Supine cane flex    2x10 2x10        Supine IR/ER AROM             SL ER     nv        SL Flex     nv        SL Abd      nv                     Ther Activity 12/27 12/30 01/04 1/6 1/10        Recumbent bike, gentle nv nv           Pulleys    3' 4'        Table slides - flex, scap    2' ea 2' ea        Seated SWB flex    nv 2'        Finger ladder    nv 5x flex only        Pt Edu 15' AL procotol, dx, dx, goals AL SP AL                                   Modalities 12/27 12/30 01/04 1/6 1/10        Ice, prn  10' 10' 10'  15' post

## 2022-01-13 ENCOUNTER — OFFICE VISIT (OUTPATIENT)
Dept: PHYSICAL THERAPY | Facility: CLINIC | Age: 62
End: 2022-01-13
Payer: COMMERCIAL

## 2022-01-13 DIAGNOSIS — M19.012 PRIMARY OSTEOARTHRITIS OF LEFT SHOULDER: ICD-10-CM

## 2022-01-13 DIAGNOSIS — Z96.612 PRESENCE OF LEFT ARTIFICIAL SHOULDER JOINT: Primary | ICD-10-CM

## 2022-01-13 DIAGNOSIS — S46.812D RUPTURE OF SUBSCAPULARIS TENDON, LEFT, SUBSEQUENT ENCOUNTER: ICD-10-CM

## 2022-01-13 PROCEDURE — 97110 THERAPEUTIC EXERCISES: CPT

## 2022-01-13 PROCEDURE — 97530 THERAPEUTIC ACTIVITIES: CPT

## 2022-01-13 PROCEDURE — 97140 MANUAL THERAPY 1/> REGIONS: CPT

## 2022-01-13 NOTE — PROGRESS NOTES
Daily Note     Today's date: 2022  Patient name: Ian Reed  : 1960  MRN: [id number]  Referring provider: Rosario Yin PA-C  Dx:   Encounter Diagnosis     ICD-10-CM    1  Presence of left artificial shoulder joint  Z96 612    2  Rupture of subscapularis tendon, left, subsequent encounter  S46 812D    3  Primary osteoarthritis of left shoulder  M19 012        Start Time: 1500  Stop Time: 1550  Total time in clinic (min): 50 minutes  Subjective: Pt reports "normal soreness" in (L) shoulder today - notes getting dressed is getting "much easier "   Pt is eager to DC sling  Objective: See treatment diary below  Pt is currently Post Op Week 3 Day 3    Assessment: Pt tolerated Tx well - including additions to exercise diary  Pt demonstrates (L) shldr PROM + AROM within desired range for current stage of protocol, therefore advised pt to continue /c current HEP program compliance  Pt demonstrated fatigue post treatment, exhibited good technique with therapeutic exercises and would benefit from continued PT to progress [address] ROM and gentle strength to improve pt's tolerance to getting dressed and performing self care daily  Plan: Cont /c PT POC  Progress as tolerated  Precautions: (L) 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 (protocol); (L) TSR 2021;   HTN; syncope; hx DVT; Bipolar disorder; LBP; C/S radiculopathy; chronic pain disorder; fibromyalgia; hx TIA's; migraines; stroke    Date 12/27 12/30 01/04 1/6 1/10 01/13       FOTO IE     xx        Re-eval IE                Manuals 12/27 12/30 01/04 1/6 1/10 01/13       (L) Shoulder PROM Weeks 1-2: Flex firm end-feel, ER 0-30 nv AL           Week 2-6: Flex 120, ER first end-feel (<50 deg at all times)   SP AL AL SP       Cervical PROM   SP                       Neuro Re-Ed 12/27 12/30 01/04 1/6 1/10 01/13       Iso scap dep in sling             Iso scap ret in sling  20x3" 5"x20HEP          Seated C/S retractions nv 15x3" 5"x20HEP 20x5"         Slouch + overcorrect nv 10x5" 5"x20HEP          Deltoid Iso's nv 10x5" ea  10x5" ea                                   Ther Ex 12/27 12/30 01/04 1/6 1/10 01/13       T/S ext over chair nv  2'          Seated T/S rotation  nv 3' 2'           UT (S) nv 5x10" 10"x10 (L)  HEP          LS (S)   HEP 5x10" ea         C/S rotation AROM nv   HEP          Digi flex nv 2' ea 5" red Red 2'ea 5" lumbr HEP Red 2' ea 5" lumbr         Pendulums nv            Seated ER PROM w/ cane 0-50    10x5" 15x5" 3'       Supine cane flex    2x10 2x10 25x       Supine IR/ER AROM             SL ER     nv 25x       SL Flex     nv 25x       SL Abd      nv 25x                    Ther Activity 12/27 12/30 01/04 1/6 1/10 01/13       Pulleys    3' 4' 5'        Table slides - flex, scap    2' ea 2' ea 2'ea + ER       Seated SWB flex    nv 2' 3'       Finger ladder    nv 5x flex only 6x flex       Pt Edu 15' AL procotol, dx, dx, goals AL SP AL                                   Modalities 12/27 12/30 01/04 1/6 1/10 01/13       Ice, prn  10' 10' 10'  15' post 10' post                        Oneida Blind, PTA

## 2022-01-17 ENCOUNTER — OFFICE VISIT (OUTPATIENT)
Dept: PHYSICAL THERAPY | Facility: CLINIC | Age: 62
End: 2022-01-17
Payer: COMMERCIAL

## 2022-01-17 DIAGNOSIS — Z96.612 PRESENCE OF LEFT ARTIFICIAL SHOULDER JOINT: Primary | ICD-10-CM

## 2022-01-17 DIAGNOSIS — M19.012 PRIMARY OSTEOARTHRITIS OF LEFT SHOULDER: ICD-10-CM

## 2022-01-17 DIAGNOSIS — S46.812D RUPTURE OF SUBSCAPULARIS TENDON, LEFT, SUBSEQUENT ENCOUNTER: ICD-10-CM

## 2022-01-17 PROCEDURE — 97110 THERAPEUTIC EXERCISES: CPT

## 2022-01-17 PROCEDURE — 97530 THERAPEUTIC ACTIVITIES: CPT

## 2022-01-17 PROCEDURE — 97140 MANUAL THERAPY 1/> REGIONS: CPT

## 2022-01-17 NOTE — PROGRESS NOTES
Daily Note     Today's date: 2022  Patient name: Javier Carcamo  : 1960  MRN: [id number]  Referring provider: Yossi Garcias PA-C  Dx:   Encounter Diagnosis     ICD-10-CM    1  Presence of left artificial shoulder joint  Z96 612    2  Rupture of subscapularis tendon, left, subsequent encounter  S46 812D    3  Primary osteoarthritis of left shoulder  M19 012                   Subjective: Pt states that she is doing well and d/c'd her sling this AM per MD  She denies increased soreness from doing so  She states that she was able to put her deodorant on the (R) UE today without much difficulty  She also began doing dishes this AM and changed the cat litter without much difficulty (notes she lifted with the right UE while doing so)  Objective: See treatment diary below      Assessment: Tolerated treatment well  Discharged ER PROM/self PROM as pt has met ROM goals  Reiterated current precautions to pt (refrain from any lifting any weight with the left UE at this time despite weaning out of sling) and educated her about joint mechanics/role of ROM in regards to rTSA  Patient demonstrated fatigue post treatment, exhibited good technique with therapeutic exercises and would benefit from continued PT to progress UE ROM per protocol and gentle UE strength to improve pt's tolerance to reaching/lifting OH daily as when performing self care  Plan: Continue per plan of care  Progress treament per protocol  Precautions: (L) 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 (protocol); (L) TSR 2021;   HTN; syncope; hx DVT; Bipolar disorder; LBP; C/S radiculopathy; chronic pain disorder; fibromyalgia; hx TIA's; migraines; stroke    Date 12/27 12/30 01/04 1/6 1/10 01/13 1/17      FOTO IE     xx        Re-eval IE                Manuals 12/27 12/30 01/04 1/6 1/10 01/13 1/17      (L) Shoulder PROM Weeks 1-2: Flex firm end-feel, ER 0-30 nv AL           Week 2-6: Flex 120, ER first end-feel (<50 deg at all times)   SP AL AL SP AL d/c'd ER      Cervical PROM   SP                       Neuro Re-Ed 12/27 12/30 01/04 1/6 1/10 01/13 1/17      Iso scap dep in sling             Iso scap ret in sling  20x3" 5"x20HEP          Seated C/S retractions nv 15x3" 5"x20HEP 20x5"         Slouch + overcorrect nv 10x5" 5"x20HEP          Deltoid Iso's nv 10x5" ea  10x5" ea                                   Ther Ex 12/27 12/30 01/04 1/6 1/10 01/13 1/17      Standing cane flex              T/S ext over chair nv  2'          Seated T/S rotation  nv 3' 2'           UT (S) nv 5x10" 10"x10 (L)  HEP          LS (S)   HEP 5x10" ea         C/S rotation AROM nv   HEP          Digi flex nv 2' ea 5" red Red 2'ea 5" lumbr HEP Red 2' ea 5" lumbr         Pendulums nv            Seated ER PROM w/ cane 0-50    10x5" 15x5" 3' D/c      Supine cane flex    2x10 2x10 25x 25x      SL ER     nv 25x 25x      SL Flex     nv 25x 25x      SL Abd      nv 25x 25x                   Ther Activity 12/27 12/30 01/04 1/6 1/10 01/13 1/17      Pulleys    3' 4' 5'  5'      Table slides - flex, scap    2' ea 2' ea 2'ea + ER 2' ea       Seated SWB flex    nv 2' 3' 3'      Finger ladder    nv 5x flex only 6x flex 6x flex      Pt Edu 15' AL procotol, dx, dx, goals AL SP AL   AL                                Modalities 12/27 12/30 01/04 1/6 1/10 01/13 1/17      Ice, prn  10' 10' 10'  15' post 10' post 10' post

## 2022-01-20 ENCOUNTER — OFFICE VISIT (OUTPATIENT)
Dept: PHYSICAL THERAPY | Facility: CLINIC | Age: 62
End: 2022-01-20
Payer: COMMERCIAL

## 2022-01-20 DIAGNOSIS — Z96.612 PRESENCE OF LEFT ARTIFICIAL SHOULDER JOINT: Primary | ICD-10-CM

## 2022-01-20 DIAGNOSIS — M19.012 PRIMARY OSTEOARTHRITIS OF LEFT SHOULDER: ICD-10-CM

## 2022-01-20 DIAGNOSIS — S46.812D RUPTURE OF SUBSCAPULARIS TENDON, LEFT, SUBSEQUENT ENCOUNTER: ICD-10-CM

## 2022-01-20 PROCEDURE — 97140 MANUAL THERAPY 1/> REGIONS: CPT

## 2022-01-20 PROCEDURE — 97530 THERAPEUTIC ACTIVITIES: CPT

## 2022-01-20 PROCEDURE — 97110 THERAPEUTIC EXERCISES: CPT

## 2022-01-20 NOTE — PROGRESS NOTES
Daily Note     Today's date: 2022  Patient name: Misty Hester  : 1960  MRN: [id number]  Referring provider: Reilly Lockwood PA-C  Dx:   Encounter Diagnosis     ICD-10-CM    1  Presence of left artificial shoulder joint  Z96 612    2  Rupture of subscapularis tendon, left, subsequent encounter  S46 812D    3  Primary osteoarthritis of left shoulder  M19 012        Start Time: 1507  Stop Time: 1555  Total time in clinic (min): 48 minutes  Subjective: Pt reports increased (L) shldr discomfort today - attributes this to ER (S) at home; pt notes "soreness" but denies sharp, shooting pain Sx      Objective: See treatment diary below    Assessment: Tolerated treatment well  Pt demonstrates improved tolerance to finger ladder today since previous visits - still demonstrates slight UT hike  PROM performed at end of Tx per pt request - discussed discontinuing PROM 2/2 pt ROM requirements are where they should be at this point and pt can cont /c self stretching ath home for maintenance  Pt demonstrated fatigue post treatment, exhibited good technique with therapeutic exercises and would benefit from continued PT to progress UE ROM per protocol and gentle UE strength to improve pt's tolerance to reaching/lifting OH daily as when performing self care  Plan: Cont /c PT POC  Progress as tolerated  Precautions: (L) 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 (protocol); (L) TSR 2021;   HTN; syncope; hx DVT; Bipolar disorder; LBP; C/S radiculopathy; chronic pain disorder; fibromyalgia; hx TIA's; migraines; stroke    Date 12/27 12/30 01/04 1/6 1/10 01/13 1/17 01/20     FOTO IE     xx        Re-eval IE                Manuals 12/27 12/30 01/04 1/6 1/10 01/13 1/17 01/20     PROM (L) Shldr        SP                  Neuro Re-Ed 12/27 12/30 01/04 1/6 1/10 01/13 1/17 01/20     Seated C/S retractions nv 15x3" 5"x20HEP 20x5"         Slouch + overcorrect nv 10x5" 5"x20HEP          Deltoid Iso's nv 10x5" ea  10x5" ea Ther Ex 12/27 12/30 01/04 1/6 1/10 01/13 1/17 01/20     Standing cane flex              T/S ext over chair nv  2'          Seated T/S rotation  nv 3' 2'           UT (S) nv 5x10" 10"x10 (L)  HEP          LS (S)   HEP 5x10" ea         C/S rotation AROM nv   HEP          Digi flex nv 2' ea 5" red Red 2'ea 5" lumbr HEP Red 2' ea 5" lumbr         Supine cane flex    2x10 2x10 25x 25x 30x     SL ER     nv 25x 25x 30x     SL Flex     nv 25x 25x 30x     SL Abd      nv 25x 25x 30x                  Ther Activity 12/27 12/30 01/04 1/6 1/10 01/13 1/17 01/20     Pulleys    3' 4' 5'  5' 5'      Table slides - flex, scap    2' ea 2' ea 2'ea + ER 2' ea       Seated SWB flex    nv 2' 3' 3' 2'      Finger ladder    nv 5x flex only 6x flex 6x flex 2'ea     Pt Edu 15' AL procotol, dx, dx, goals AL SP AL   AL                                Modalities 12/27 12/30 01/04 1/6 1/10 01/13 1/17 01/20     Ice, prn  10' 10' 10'  15' post 10' post 10' post                       Gaynel Sings, PTA

## 2022-01-24 ENCOUNTER — OFFICE VISIT (OUTPATIENT)
Dept: PHYSICAL THERAPY | Facility: CLINIC | Age: 62
End: 2022-01-24
Payer: COMMERCIAL

## 2022-01-24 DIAGNOSIS — Z96.612 PRESENCE OF LEFT ARTIFICIAL SHOULDER JOINT: Primary | ICD-10-CM

## 2022-01-24 DIAGNOSIS — M19.012 PRIMARY OSTEOARTHRITIS OF LEFT SHOULDER: ICD-10-CM

## 2022-01-24 DIAGNOSIS — S46.812D RUPTURE OF SUBSCAPULARIS TENDON, LEFT, SUBSEQUENT ENCOUNTER: ICD-10-CM

## 2022-01-24 PROCEDURE — 97110 THERAPEUTIC EXERCISES: CPT

## 2022-01-24 PROCEDURE — 97140 MANUAL THERAPY 1/> REGIONS: CPT

## 2022-01-24 PROCEDURE — 97530 THERAPEUTIC ACTIVITIES: CPT

## 2022-01-24 NOTE — PROGRESS NOTES
Daily Note     Today's date: 2022  Patient name: Nena Garcia  : 1960  MRN: [id number]  Referring provider: Gael Teresa PA-C  Dx:   Encounter Diagnosis     ICD-10-CM    1  Presence of left artificial shoulder joint  Z96 612    2  Rupture of subscapularis tendon, left, subsequent encounter  S46 812D    3  Primary osteoarthritis of left shoulder  M19 012        Start Time: 1503  Stop Time: 1552  Total time in clinic (min): 49 minutes  Subjective: Pt reports increased soreness in (L) shldr recently - pt notes muscle soreness  Objective: See treatment diary below  Pt is currently Post Op Week 4 Day 7    Assessment: Tolerated treatment well including gentle progressions to exercise diary  PT (L) shldr PROM WNL for current stage of protocol, therefore stretching was performed gently  PtPt demonstrated fatigue post treatment, exhibited good technique with therapeutic exercises and would benefit from continued PT to progress UE ROM per protocol and gentle UE strength to improve pt's tolerance to reaching/lifting OH daily as when performing self care  Plan: Cont /c PT POC  Progress as tolerated  Precautions: (L) 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 (protocol); (L) TSR 2021;   HTN; syncope; hx DVT; Bipolar disorder; LBP; C/S radiculopathy; chronic pain disorder; fibromyalgia; hx TIA's; migraines; stroke    Date 12/27 12/30 01/04 1/6 1/10 01/13 1/17 01/20 01/24    FOTO IE     xx        Re-eval IE                Manuals 12/27 12/30 01/04 1/6 1/10 01/13 1/17 01/20 01/24    PROM (L) Shldr        SP SP                 Neuro Re-Ed 12/27 12/30 01/04 1/6 1/10 01/13 1/17 01/20 01/24    Seated C/S retractions nv 15x3" 5"x20HEP 20x5"         Slouch + overcorrect nv 10x5" 5"x20HEP          Deltoid Iso's nv 10x5" ea  10x5" ea                                   Ther Ex  1/6 1/10 01/13 1/17 01/20 01/24    Standing cane flex              T/S ext over chair nv  2'          Seated T/S rotation  nv 3' 2'           UT (S) nv 5x10" 10"x10 (L)  HEP          LS (S)   HEP 5x10" ea         C/S rotation AROM nv   HEP          Digi flex nv 2' ea 5" red Red 2'ea 5" lumbr HEP Red 2' ea 5" lumbr         Supine cane flex    2x10 2x10 25x 25x 30x 2#x30    SL ER     nv 25x 25x 30x     SL Flex     nv 25x 25x 30x     SL Abd      nv 25x 25x 30x                  Ther Activity 12/27 12/30 01/04 1/6 1/10 01/13 1/17 01/20 01/24    Pulleys    3' 4' 5'  5' 5'  5'     Table slides - flex, scap    2' ea 2' ea 2'ea + ER 2' ea   2'ea inc    Seated SWB flex    nv 2' 3' 3' 2'  3'     Finger ladder    nv 5x flex only 6x flex 6x flex 2'ea 90"ea    Pt Edu 15' AL procotol, dx, dx, goals AL SP AL   AL                                Modalities 12/27 12/30 01/04 1/6 1/10 01/13 1/17 01/20 01/24    Ice, prn  10' 10' 10'  15' post 10' post 10' post      MHP         10'         Nell[place] Blind, PTA

## 2022-01-27 ENCOUNTER — OFFICE VISIT (OUTPATIENT)
Dept: PHYSICAL THERAPY | Facility: CLINIC | Age: 62
End: 2022-01-27
Payer: COMMERCIAL

## 2022-01-27 DIAGNOSIS — S46.812D RUPTURE OF SUBSCAPULARIS TENDON, LEFT, SUBSEQUENT ENCOUNTER: Primary | ICD-10-CM

## 2022-01-27 DIAGNOSIS — Z96.612 PRESENCE OF LEFT ARTIFICIAL SHOULDER JOINT: ICD-10-CM

## 2022-01-27 DIAGNOSIS — M19.012 PRIMARY OSTEOARTHRITIS OF LEFT SHOULDER: ICD-10-CM

## 2022-01-27 PROCEDURE — 97530 THERAPEUTIC ACTIVITIES: CPT

## 2022-01-27 PROCEDURE — 97110 THERAPEUTIC EXERCISES: CPT

## 2022-01-27 PROCEDURE — 97140 MANUAL THERAPY 1/> REGIONS: CPT

## 2022-01-27 NOTE — PROGRESS NOTES
Daily Note     Today's date: 2022  Patient name: Dakota Valenzuela  : 1960  MRN: [id number]  Referring provider: Nitin Ying PA-C  Dx:   Encounter Diagnosis     ICD-10-CM    1  Rupture of subscapularis tendon, left, subsequent encounter  S46 812D    2  Presence of left artificial shoulder joint  Z96 612    3  Primary osteoarthritis of left shoulder  M19 012        Start Time: 1509  Stop Time: 1554  Total time in clinic (min): 45 minutes  Subjective: Pt notes that overall her (L) shoulder has been doing well - notes increased soreness today which she attributes to falling asleep on her (L) side  Pt notes (+) response to addition of MHP LV - states she likes this more than CP  Objective: See treatment diary below  Pt is currently Post Op Week 5 Day 3    Assessment: Tolerated treatment well including gentle progressions to exercise diary  PT (L) shldr PROM WNL for current stage of protocol, therefore stretching was performed gently  Pt tolerates increased resistance during today's program well - denying pain or fatigue /c progression  Pt demonstrated fatigue post treatment, exhibited good technique with therapeutic exercises and would benefit from continued PT to progress UE ROM per protocol and gentle UE strength to improve pt's tolerance to reaching/lifting [place] daily as when performing self care  Plan: Cont /c PT POC  Progress as tolerated  Precautions: (L) 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 (protocol); (L) TSR 2021;   HTN; syncope; hx DVT; Bipolar disorder; LBP; C/S radiculopathy; chronic pain disorder; fibromyalgia; hx TIA's; migraines; stroke    Date 12/27 12/30 01/04 1/6 1/10 01/13 1/17 01/20 01/24 01/27   FOTO IE     xx        Re-eval IE                Manuals 12/27 12/30 01/04 1/6 1/10 01/13 1/17 01/20 01/24 01/27   PROM (L) Shldr        SP SP SP                Neuro Re-Ed 12/27 12/30 01/04 1/6 1/10 01/13 1/17 01/20 01/24 01/27   Seated C/S retractions nv 15x3" 5"x20HEP 20x5" Slouch + overcorrect nv 10x5" 5"x20HEP          Deltoid Iso's nv 10x5" ea  10x5" ea                                   Ther Ex 12/27 12/30 01/04 1/6 1/10 01/13 1/17 01/20 01/24 01/27   Standing cane flex              T/S ext over chair nv  2'          Seated T/S rotation  nv 3' 2'           UT (S) nv 5x10" 10"x10 (L)  HEP          LS (S)   HEP 5x10" ea         C/S rotation AROM nv   HEP          Digi flex nv 2' ea 5" red Red 2'ea 5" lumbr HEP Red 2' ea 5" lumbr         Supine cane flex    2x10 2x10 25x 25x 30x 2#x30 3# 2'   SL ER     nv 25x 25x 30x  1# 2'   SL Flex     nv 25x 25x 30x  1# 2'    SL Abd      nv 25x 25x 30x                  Ther Activity 12/27 12/30 01/04 1/6 1/10 01/13 1/17 01/20 01/24 01/27   Pulleys    3' 4' 5'  5' 5'  5'  5'    Table slides - flex, scap    2' ea 2' ea 2'ea + ER 2' ea   2'ea inc 2'ea inc   Seated SWB flex    nv 2' 3' 3' 2'  3'  3'   Finger ladder    nv 5x flex only 6x flex 6x flex 2'ea 90"ea 2'ea   Pt Edu 15' AL procotol, dx, dx, goals AL SP AL   AL                                Modalities 12/27 12/30 01/04 1/6 1/10 01/13 1/17 01/20 01/24 01/27   Ice, prn  10' 10' 10'  15' post 10' post 10' post      MHP         10'  10'        Valarie Marking, PTA

## 2022-01-29 ENCOUNTER — NURSE TRIAGE (OUTPATIENT)
Dept: OTHER | Facility: OTHER | Age: 62
End: 2022-01-29

## 2022-01-29 NOTE — TELEPHONE ENCOUNTER
Tiger text to on call ortho provider Dr Soren Garcia regarding patients symptoms  Per on call provider:    Continue to alternate heat and ice intermittently     Follow up with ortho office on Monday     Call back if your symptoms worsen     Patient made aware and verbalizes understanding     Reason for Disposition   [1] SEVERE pain AND [2] not improved 2 hours after pain medicine    Answer Assessment - Initial Assessment Questions  1  ONSET: "When did the pain start?"        Got worse after PT on Thursday   PT added weights on Thursday    2  LOCATION: "Where is the pain located?"        Left shoulder and left arm     3  PAIN: "How bad is the pain?" (Scale 1-10; or mild, moderate, severe)    - MILD (1-3): doesn't interfere with normal activities    - MODERATE (4-7): interferes with normal activities (e g , work or school) or awakens from sleep    - SEVERE (8-10): excruciating pain, unable to do any normal activities, unable to move arm at all due to pain        Severe     4  WORK OR EXERCISE: "Has there been any recent work or exercise that involved this part of the body?"        Had surgery 12/21 with Dr Ruben Camacho, left shoulder arthroplasty  Has been going to PT     5  CAUSE: "What do you think is causing the shoulder pain?"        Pain worse after adding weights at PT on Thursday    6   OTHER SYMPTOMS: "Do you have any other symptoms?" (e g , neck pain, swelling, rash, fever, numbness, weakness)    Muscle tightness, pain   Muscles wont relax  Patient states she tried advil and tyelnol and heat and ice and massage and no relief     Patient denies new trauma, denies numbness , denies tingling, denies swelling    Protocols used: SHOULDER PAIN-ADULT-

## 2022-01-29 NOTE — TELEPHONE ENCOUNTER
Regarding: Muscle Tightness  ----- Message from Amarilis Sanders sent at 1/29/2022  1:49 PM EST -----  "I had surgery on 12/31   My PT just added weights, and now my muscles are so tight that I just can't relax them "

## 2022-01-29 NOTE — TELEPHONE ENCOUNTER
On call provider paged and will call pt directly  Reason for Disposition   [1] Caller has URGENT question AND [2] triager unable to answer question    Answer Assessment - Initial Assessment Questions  1  SYMPTOM: "What's the main symptom you're concerned about?" (e g , pain, fever, vomiting)      Shoulder pain   2  ONSET: "When did *No Answer*  start?"      Since Friday after Physical therapy session  3  SURGERY: "What surgery was performed?"      12/21/21 shoulder replacement reversal  4  DATE of SURGERY: "When was surgery performed?"       12/21/21  5  ANESTHESIA: " What type of anesthesia did you have?" (e g , general, spinal, epidural, local)      unknown  6  PAIN: "Is there any pain?" If Yes, ask: "How bad is it?"  (Scale 1-10; or mild, moderate, severe)      tightness  7  FEVER: "Do you have a fever?" If Yes, ask: "What is your temperature, how was it measured, and when did it start?"      no  8  VOMITING: "Is there any vomiting?" If yes, ask: "How many times?"      no  9  BLEEDING: "Is there any bleeding?" If Yes, ask: "How much?" and "Where?"     no  10   OTHER SYMPTOMS: "Do you have any other symptoms?" (e g , drainage from wound, painful urination, constipation)        no    Protocols used: POST-OP SYMPTOMS AND QUESTIONS-Former[facility], later [facility]

## 2022-01-29 NOTE — TELEPHONE ENCOUNTER
Regarding: ortho- pain in shoulder after PT w/ weights- hurts more than normal- sx 12/21  ----- Message from John Mera sent at 1/29/2022  1:52 PM EST -----  "I needed to speak with an on call or nurse  I had surgery not that long ago, 12/21 and I have started PT  The other day they started weights and I am experiencing really bad pain in my shoulder that I do not think is normal "  I was on the phone with pt and it just hung up, I reached back out to her on this number but no answer so I just left a message that a nurse would be calling   I think she heard most of what I had said before disconnection

## 2022-01-31 ENCOUNTER — OFFICE VISIT (OUTPATIENT)
Dept: PHYSICAL THERAPY | Facility: CLINIC | Age: 62
End: 2022-01-31
Payer: COMMERCIAL

## 2022-01-31 DIAGNOSIS — M19.012 PRIMARY OSTEOARTHRITIS OF LEFT SHOULDER: ICD-10-CM

## 2022-01-31 DIAGNOSIS — Z96.612 PRESENCE OF LEFT ARTIFICIAL SHOULDER JOINT: ICD-10-CM

## 2022-01-31 DIAGNOSIS — S46.812D RUPTURE OF SUBSCAPULARIS TENDON, LEFT, SUBSEQUENT ENCOUNTER: Primary | ICD-10-CM

## 2022-01-31 PROCEDURE — 97110 THERAPEUTIC EXERCISES: CPT

## 2022-01-31 PROCEDURE — 97140 MANUAL THERAPY 1/> REGIONS: CPT

## 2022-01-31 PROCEDURE — 97530 THERAPEUTIC ACTIVITIES: CPT

## 2022-01-31 NOTE — PROGRESS NOTES
Daily Note     Today's date: 2022  Patient name: Margarita Maki  : 1960  MRN: [id number]  Referring provider: Emerald Recio PA-C  Dx:   Encounter Diagnosis     ICD-10-CM    1  Rupture of subscapularis tendon, left, subsequent encounter  S46 812D    2  Presence of left artificial shoulder joint  Z96 612    3  Primary osteoarthritis of left shoulder  M19 012        Start Time: 1435  Stop Time: 1524  Total time in clinic (min): 49 minutes  Subjective: Pt reports significant tightness/stiffnes in (L) UE (points to lateral proximal humerus) on Friday + Saturday - notes her MD prescribed her medication and instructed her to not perform any exercises until meeting /c PT  Objective: See treatment diary below  Pt is currently Post Op Week 5 Day 7    Assessment: Tx regressed slightly 2/2 pt subjective statements - pt tolerated treatment well  Pt demonstrates (L) UT hiking during most exercises requiring elevation of (L)UE  Pt demonstrated fatigue post treatment, exhibited good technique with therapeutic exercises and would benefit from continued PT to progress UE ROM per protocol and gentle UE strength to improve pt's tolerance to reaching/lifting [place] daily as when performing self care  Plan: Cont /c PT POC  Progress as tolerated  Precautions: (L) 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 (protocol); (L) TSR 2021;   HTN; syncope; hx DVT; Bipolar disorder; LBP; C/S radiculopathy; chronic pain disorder; fibromyalgia; hx TIA's; migraines; stroke    Date 01/31  01/04 1/6 1/10 01/13 1/17 01/20 01/24 01/27   Visits 11  3 4 5 6 7 8 9 10   FOTO      xx        Re-eval                 Manuals 01/31  01/04 1/6 1/10 01/13 1/17 01/20 01/24 01/27   PROM (L) Shldr SP       SP SP SP                Neuro Re-Ed 01/31  01/04 1/6 1/10 01/13 1/17 01/20 01/24 01/27   Seated C/S retractions   5"x20HEP 20x5"         Slouch + overcorrect   5"x20HEP          Deltoid Iso's    10x5" ea                                   Ther Ex  01/04 1/6 1/10 01/13 1/17 01/20 01/24 01/27   Standing cane flex              T/S ext over chair   2'          Seated T/S rotation    2'           UT (S)   10"x10 (L)  HEP          LS (S)   HEP 5x10" ea         C/S rotation AROM    HEP          Digi flex   Red 2'ea 5" lumbr HEP Red 2' ea 5" lumbr         Supine cane flex 1#x30   2x10 2x10 25x 25x 30x 2#x30 3# 2'   SL ER 30x    nv 25x 25x 30x  1# 2'   SL Flex 30x    nv 25x 25x 30x  1# 2'    SL Abd      nv 25x 25x 30x                  Ther Activity 01/31  01/04 1/6 1/10 01/13 1/17 01/20 01/24 01/27   Pulleys 5'    3' 4' 5'  5' 5'  5'  5'    Table slides - flex, scap 2' + ER   2' ea 2' ea 2'ea + ER 2' ea   2'ea inc 2'ea inc   Seated SWB flex    nv 2' 3' 3' 2'  3'  3'   Finger ladder 10ea   nv 5x flex only 6x flex 6x flex 2'ea 90"ea 2'ea   Pt Edu   SP AL   AL                                Modalities 01/31  01/04 1/6 1/10 01/13 1/17 01/20 01/24 01/27   Ice, prn 10 post  10' 10'  15' post 10' post 10' post      MHP         10'  10'        James Multani, PTA

## 2022-02-03 ENCOUNTER — EVALUATION (OUTPATIENT)
Dept: PHYSICAL THERAPY | Facility: CLINIC | Age: 62
End: 2022-02-03
Payer: COMMERCIAL

## 2022-02-03 DIAGNOSIS — M19.012 PRIMARY OSTEOARTHRITIS OF LEFT SHOULDER: ICD-10-CM

## 2022-02-03 DIAGNOSIS — S46.812D RUPTURE OF SUBSCAPULARIS TENDON, LEFT, SUBSEQUENT ENCOUNTER: ICD-10-CM

## 2022-02-03 DIAGNOSIS — Z96.612 PRESENCE OF LEFT ARTIFICIAL SHOULDER JOINT: Primary | ICD-10-CM

## 2022-02-03 PROCEDURE — 97164 PT RE-EVAL EST PLAN CARE: CPT

## 2022-02-03 PROCEDURE — 97530 THERAPEUTIC ACTIVITIES: CPT

## 2022-02-03 NOTE — PROGRESS NOTES
PT Re-Evaluation     Today's date: 2/3/2022  Patient name: Idalia Ha  : 1960  MRN: [id number]  Referring provider: Szuanne Aparicio PA-C  Dx:   Encounter Diagnosis     ICD-10-CM    1  Presence of left artificial shoulder joint  Z96 612    2  Rupture of subscapularis tendon, left, subsequent encounter  S46 812D    3  Primary osteoarthritis of left shoulder  M19 012                   Assessment  Assessment details: To date, Ms Niya Elizondo has participated in a total of 10 skilled therapy sessions for tx of the (L) shoulder following recent (L) reverse total shoulder replacement on 21  Upon reassessment today, she is making steady progress toward her goals  Objectively, she demonstrates increasing (L) shoulder AROM/PROM (she has met her flex/ER PROM ROM limits established per protocol) and increasing UE strength/ strength  Subjectively, she reports an improving ability completing most ADLs, including light cooking/cleaning, showering, getting dressed, driving, and sleeping at night  She remains limited with lifting heavy objects; vacuuming; and cooking  She continues to present with postural deviations; decreased cervicothoracic AROM; decreased UE strength; and decreased (L) shoulder AROM  She would therefore benefit from 4 more weeks of PT intervention in order to address the aforementioned deficits so that she can return to her PLOF and function comfortably/safely in her home and surrounding environment  Impairments: abnormal or restricted ROM, impaired physical strength, pain with function, poor posture  and poor body mechanics  Understanding of Dx/Px/POC: good   Prognosis: good    Goals  STG 1: Pt will demonstrate compliance with HEP to supplement therapy in 1-2 weeks  MET  STG 2: Pt will report 25% reduction in pain in 2-4 weeks  PROGRESSING  STG 3: Pt will be able to bathe and complete self-care with min difficulty related to the (L) shoulder in 2-4 weeks   PROGRESSING  LTG 1: Pt will be able to reach [facility] with min increased symptoms in 6-8 weeks  PROGRESSING  LTG 2: Pt will be able shower with min to no difficulty related to the (L) UE in 6-8 weeks  PROGRESSING  LTG 3: Pt will be able to get dressed with min to no difficulty related to the (L) UE in 6-8 weeks  PROGRESSING  LTG 4: Pt will be able to sleep comfortably at night without difficulty related to the (L) UE in 8-10 weeks  PROGRESSING - pt reports minimal difficulty related to the shoulder aside from recent bouts of increased pain/spasming which occurred in the middle of the night  Still sleeps in recliner however this is due to other medical reasons  LTG 5: Pt will be able to resume cooking and cleaning with minimal difficulty related to the (L) UE in 8-12 weeks  SLOWLY PROGRESSING - pt not yet vacuuming, lifting; able to cook lightly    Plan  Plan details: Educated pt today about achievement of ROM goals; prognosis; precautions; and PT POC  Patient would benefit from: skilled physical therapy  Planned modality interventions: cryotherapy, TENS and unattended electrical stimulation  Planned therapy interventions: abdominal trunk stabilization, postural training, patient education, neuromuscular re-education, joint mobilization, manual therapy, massage, activity modification, balance, body mechanics training, Lu taping, strengthening, stretching, therapeutic activities, coordination, flexibility, home exercise program, therapeutic exercise, functional ROM exercises and balance/weight bearing training  Frequency: 2x week  Duration in weeks: 4  Treatment plan discussed with: patient        Subjective Evaluation    History of Present Illness  Date of surgery: 12/21/2021  Mechanism of injury: Re-eval 2/3: Pt states that she is doing "okay" today  She has been experiencing more pain lately thru the (R) superior shoulder which began a couple of days ago  She was recently prescribed Robaxin for muscle spasms which has been helping a little   She was also prescribed Tramadol but has not yet picked this up  Since coming to therapy, pt reports an improving ability showering, dusting, getting dressed, cooking, doing dishes, and driving  She is able to reach [place] and laterally "a little bit" with the (L) UE  She has not yet tried vacuuming or doing laundry (specifically, carrying it)  She also has some difficulty turning the pot while cooking and has not yet tried much heavy lifting  She cannot reach fully [place]  IE: Pt states that she initially underwent a (L) TSA in 2021  She fell this summer and believes that she tore a tendon in her (L) rotator cuff at this time  She trialed physical therapy however had continued difficulty reaching [place]  As a result, she recently underwent a (L) reverse TSA on 2021 to address  She stayed in the hospital overnight without complications and has returned to home  Since then, she is still in a lot of pain  She is having difficulty completing most ADLs aside from going to the bathroom  She reports compliance with use of the sling and she has been performing the pendulums daily  She f/u with Dr Savannah Calvin on   Pain  Current pain ratin  At best pain ratin  At worst pain ratin  Location: Pt reports pain thru the (L) superior shoulder  Pt denies any recent N/T  Patient Goals  Patient goal: Pt would like to be able to lift her (L) UE overhead again  Objective     Postural Observations    Additional Postural Observation Details  Rounded shoulders, inc T/S kyphosis, forward head    Observations     Additional Observation Details  Incision fully healed, is not sensitive to touch  Good scar mobility throughout    Palpation     Additional Palpation Details  No TTP thru (R) post cuff, biceps, UTs, C/S PS, rhomboids      Active Range of Motion   Cervical/Thoracic Spine       Cervical    Flexion: 40 degrees   Extension: 20 degrees      Left lateral flexion: 20 degrees      Right lateral flexion: 30 degrees      Left rotation: 45 degrees  Right rotation: 40 degrees         Thoracic    Flexion:  Restriction level: moderate  Extension:  Restriction level: maximal  Left rotation:  Restriction level: maximal  Right rotation:  Restriction level: maximal  Left Shoulder   Flexion: 56 degrees   Abduction: 70 degrees   External rotation BTH: Active external rotation behind the head: Ipsi ear  Internal rotation BTB: Active internal rotation behind the back: Ipsi greater trochanter  Right Shoulder   Flexion: 135 degrees   Abduction: 135 degrees   External rotation BTH: Active external rotation behind the head: Occiput  Internal rotation BTB: L2     Passive Range of Motion   Left Shoulder   Flexion: 125 degrees   External rotation 0°: 52 degrees     Strength/Myotome Testing     Left Shoulder     Planes of Motion   Flexion: 4 (thru available range)   Abduction: 4 (thru available range)   External rotation at 0°: 4   Internal rotation at 0°: 4+     Right Shoulder     Planes of Motion   Flexion: 5   Abduction: 5   External rotation at 0°: 5   Internal rotation at 0°: 5     Isolated Muscles   Biceps: 4   Triceps: 4+     Additional Strength Details   strength: 45# on (R), 49# on (L)  LT# on (L)  MET      Flowsheet Rows      Most Recent Value   PT/OT G-Codes    Current Score 53   Projected Score 58             Precautions: 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 - see protocol; Hx (L) TSR 2021; HTN; syncope; hx DVT; Bipolar disorder; LBP; C/S radiculopathy; chronic pain disorder; fibromyalgia; hx TIA's; migraines; stroke    Date 01/31 2/3   1/10 01/13 1/17 01/20 01/24 01/27   Visits 11 12   5 6 7 8 9 10   FOTO  xx    xx        Re-eval  xx               Manuals 01/31 2/3   1/10 01/13 1/17 01/20 01/24 01/27   PROM (L) Shldr SP       SP SP SP   Re-eval  AL                        Neuro Re-Ed 01/31 2/3   1/10 01/13 1/17 01/20 01/24 01/27   Seated C/S retractions             Slouch + overcorrect             Deltoid Iso's Ther Ex 01/31 2/3   1/10 01/13 1/17 01/20 01/24 01/27   Standing cane flex              T/S ext over chair             Seated T/S rotation              UT (S)             LS (S)             C/S rotation AROM             Digi flex             Supine cane flex 1#x30    2x10 25x 25x 30x 2#x30 3# 2'   SL ER 30x    nv 25x 25x 30x  1# 2'   SL Flex 30x    nv 25x 25x 30x  1# 2'    SL Abd      nv 25x 25x 30x                  Ther Activity 01/31 2/3   1/10 01/13 1/17 01/20 01/24 01/27   Pulleys 5'     4' 5'  5' 5'  5'  5'    Table slides - flex, scap 2' + ER    2' ea 2'ea + ER 2' ea   2'ea inc 2'ea inc   Seated SWB flex     2' 3' 3' 2'  3'  3'   Finger ladder 10ea    5x flex only 6x flex 6x flex 2'ea 90"ea 2'ea   Pt Edu  10' AL     AL                                Modalities 01/31 2/3   1/10 01/13 1/17 01/20 01/24 01/27   Ice, prn 10 post    15' post 10' post 10' post      MHP         10'  10'

## 2022-02-07 ENCOUNTER — OFFICE VISIT (OUTPATIENT)
Dept: PHYSICAL THERAPY | Facility: CLINIC | Age: 62
End: 2022-02-07
Payer: COMMERCIAL

## 2022-02-07 DIAGNOSIS — S46.812D RUPTURE OF SUBSCAPULARIS TENDON, LEFT, SUBSEQUENT ENCOUNTER: Primary | ICD-10-CM

## 2022-02-07 DIAGNOSIS — Z96.612 PRESENCE OF LEFT ARTIFICIAL SHOULDER JOINT: ICD-10-CM

## 2022-02-07 DIAGNOSIS — M19.012 PRIMARY OSTEOARTHRITIS OF LEFT SHOULDER: ICD-10-CM

## 2022-02-07 PROCEDURE — 97110 THERAPEUTIC EXERCISES: CPT

## 2022-02-07 PROCEDURE — 97530 THERAPEUTIC ACTIVITIES: CPT

## 2022-02-07 PROCEDURE — 97140 MANUAL THERAPY 1/> REGIONS: CPT

## 2022-02-07 NOTE — PROGRESS NOTES
Daily Note     Today's date: 2022  Patient name: Waleska Scruggs  : 1960  MRN: [id number]  Referring provider: Ghulam Stephens PA-C  Dx:   Encounter Diagnosis     ICD-10-CM    1  Rupture of subscapularis tendon, left, subsequent encounter  S46 812D    2  Presence of left artificial shoulder joint  Z96 612    3  Primary osteoarthritis of left shoulder  M19 012        Start Time: 1507  Stop Time: 1553  Total time in clinic (min): 46 minutes  Subjective: Pt arrives to Tx offering no new complaints re: (L) shoulder since recent re-eval   Pt reports she has resumed using weights during HEP and has started with low reps (10x) to avoid increased pain/disocmfort - denies adverse Sx following this  Objective: See treatment diary below    Assessment: Pt tolerated Tx well, including gentle progressions to exercise diary, requiring initial cuing for form /c good carryover demonstrated  Provided pt education re: progressing as tolerated each visit, DOMS and/or fatigue following increased strengthening exercises, and new shoulder arthrokinematics  Pt would benefit from continued PT  Plan: Cont /c PT POC  Progress as tolerated  Precautions: 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 - see protocol; Hx (L) TSR 2021; HTN; syncope; hx DVT; Bipolar disorder; LBP; C/S radiculopathy; chronic pain disorder; fibromyalgia; hx TIA's; migraines; stroke    Date 01/31 2/3 02/07  1/10 01/13 1/17 01/20 01/24 01/27   Visits 11 12 13  5 6 7 8 9 10   FOTO  xx    xx        Re-eval  xx               Manuals 01/31 2/3 02/07  1/10 01/13 1/17 01/20 01/24 01/27   PROM (L) Shldr SP  SP     SP SP SP   Re-eval  AL                        Neuro Re-Ed 01/31 2/3 02/07  1/10 01/13 1/17 01/20 01/24 01/27   Seated C/S retractions             Slouch + overcorrect             Deltoid Iso's                                       Ther Ex 01/31 2/3 02/07  1/10 01/13 1/17 01/20 01/24 01/27   Standing flex    nv          [employer] Row   9Rx30 Shldr Ext   6Rx30          Supine cane flex 1#x30    2x10 25x 25x 30x 2#x30 3# 2'   SL ER 30x  1#x20  nv 25x 25x 30x  1# 2'   SL Flex 30x  1#x20  nv 25x 25x 30x  1# 2'    SL Abd    1#x20  nv 25x 25x 30x                  Ther Activity 01/31 2/3 02/07  1/10 01/13 1/17 01/20 01/24 01/27   Pulleys 5'   3'   4' 5'  5' 5'  5'  5'    Wall Slides ff, scap   10ea          Finger ladder ff, scap 10ea  10ea  5x flex only 6x flex 6x flex 2'ea 90"ea 2'ea   Pt Edu  10' AL SP    AL                                Modalities 01/31 2/3 02/07  1/10 01/13 1/17 01/20 01/24 01/27   Ice, prn 10 post  10' post  15' post 10' post 10' post      MHP         10'  10'        Rolando Mcnamara, PTA

## 2022-02-10 ENCOUNTER — OFFICE VISIT (OUTPATIENT)
Dept: PHYSICAL THERAPY | Facility: CLINIC | Age: 62
End: 2022-02-10
Payer: COMMERCIAL

## 2022-02-10 DIAGNOSIS — S46.812D RUPTURE OF SUBSCAPULARIS TENDON, LEFT, SUBSEQUENT ENCOUNTER: Primary | ICD-10-CM

## 2022-02-10 DIAGNOSIS — M19.012 PRIMARY OSTEOARTHRITIS OF LEFT SHOULDER: ICD-10-CM

## 2022-02-10 DIAGNOSIS — Z96.612 PRESENCE OF LEFT ARTIFICIAL SHOULDER JOINT: ICD-10-CM

## 2022-02-10 PROCEDURE — 97110 THERAPEUTIC EXERCISES: CPT

## 2022-02-10 PROCEDURE — 97140 MANUAL THERAPY 1/> REGIONS: CPT

## 2022-02-10 PROCEDURE — 97530 THERAPEUTIC ACTIVITIES: CPT

## 2022-02-10 NOTE — PROGRESS NOTES
Daily Note     Today's date: 2/10/2022  Patient name: Travon Freeman  : 1960  MRN: [id number]  Referring provider: Luke Smith PA-C  Dx:   Encounter Diagnosis     ICD-10-CM    1  Rupture of subscapularis tendon, left, subsequent encounter  S46 812D    2  Presence of left artificial shoulder joint  Z96 612    3  Primary osteoarthritis of left shoulder  M19 012        Start Time: 1509  Stop Time: 1558  Total time in clinic (min): 49 minutes  Subjective: Pt denies DOMS following LV so she performed HEP but thinks she may have aggravated it by using 1# wt during SL ER or while on the pulleys (notes "they were acting funny" and thinks she may have overstretched it)  Objective: See treatment diary below    Assessment: Pt tolerated Tx well despite soreness/fatigue reported when coming in today  Discussed /c pt cont /c current HEP and using weight as tolerated and progressing in home from supine cane flex /c wt to standing cane flex and adding wt as tolerated  Pt cont to demonstrate significant UT hiking during (L)UE elevation  Provided pt education re: progressing as tolerated each visit, DOMS and/or fatigue following increased strengthening exercises, and new shoulder arthrokinematics  Pt would benefit from continued PT  Plan: Cont /c PT POC  Progress as tolerated  Precautions: 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 - see protocol; Hx (L) TSR 2021; HTN; syncope; hx DVT; Bipolar disorder; LBP; C/S radiculopathy; chronic pain disorder; fibromyalgia; hx TIA's; migraines; stroke    Date 01/31 2/3 02/07 02/10  01/13 1/17 01/20 01/24 01/27   Visits 11 12 13   6 7 8 9 10   FOTO  xx           Re-eval  xx               Manuals 01/31 2/3 02/07 02/10  01/13 1/17 01/20 01/24 01/27   PROM (L) Shldr SP  SP SP    SP SP SP   Re-eval  AL                        Neuro Re-Ed 01/31 2/3 02/07 02/10  01/13 1/17 01/20 01/24 01/27   Seated C/S retractions             Slouch + overcorrect             Deltoid Iso's Ther Ex 01/31 2/3 02/07 02/10  01/13 1/17 01/20 01/24 01/27   Standing flex    nv          Shldr Row   9Rx30 9Rx30         Shldr Ext   6Rx30 6Rx30         Supine cane flex 1#x30     25x 25x 30x 2#x30 3# 2'   SL ER 30x  1#x20 1#x30  25x 25x 30x  1# 2'   SL Flex 30x  1#x20 1#x30  25x 25x 30x  1# 2'    SL Abd    1#x20 1#x30  25x 25x 30x                  Ther Activity 01/31 2/3 02/07 02/10  01/13 1/17 01/20 01/24 01/27   UBE    2'/2'         Pulleys 5'   3'    5'  5' 5'  5'  5'    Wall Slides ff, scap   10ea 90" ff 45" scap         Finger ladder ff, scap 10ea  10ea 10ea  6x flex 6x flex 2'ea 90"ea 2'ea   Pt Edu  10' AL SP SP   AL                                Modalities 01/31 2/3 02/07 02/10  01/13 1/17 01/20 01/24 01/27   Ice, prn 10 post  10' post   10' post 10' post      MHP         10'  10'        [place] Ringer, PTA

## 2022-02-12 ENCOUNTER — NURSE TRIAGE (OUTPATIENT)
Dept: OTHER | Facility: OTHER | Age: 62
End: 2022-02-12

## 2022-02-12 NOTE — TELEPHONE ENCOUNTER
Reason for Disposition   [1] MODERATE pain (e g , interferes with normal activities) AND [2] present > 3 days    Answer Assessment - Initial Assessment Questions  1  ONSET: "When did the pain start?"      Three days ago  2  LOCATION: "Where is the pain located?"      Lt shoulder  3  PAIN: "How bad is the pain?" (Scale 1-10; or mild, moderate, severe)    - MILD (1-3): doesn't interfere with normal activities    - MODERATE (4-7): interferes with normal activities (e g , work or school) or awakens from sleep    - SEVERE (8-10): excruciating pain, unable to do any normal activities, unable to move arm at all due to pain     7/10  4  WORK OR EXERCISE: "Has there been any recent work or exercise that involved this part of the body?"      Pt started new pully /weights  on that arm this week during physical therapy  5  CAUSE: "What do you think is causing the shoulder pain?"      Possible pt therapy  6  OTHER SYMPTOMS: "Do you have any other symptoms?" (e g , neck pain, swelling, rash, fever, numbness, weakness)      Pain with movement no swelling    All methods tried to relieve pain is not working  Has tried tylenol, motrin  biofreeze and warm compress with no relief      Protocols used: SHOULDER PAIN-ADULT-

## 2022-02-12 NOTE — TELEPHONE ENCOUNTER
Regarding: Shoulder Pain   ----- Message from [facility] sent at 2/12/2022  1:34 PM EST -----  "I am calling because I had surgery on December 21st and im having pain in my shoulder  It is a sharp feeling  I started physical therapy with weights and pullings and I think that is what it is from   I tried tylenol but doesn't seem to be helping much "

## 2022-02-13 ENCOUNTER — OFFICE VISIT (OUTPATIENT)
Dept: URGENT CARE | Facility: MEDICAL CENTER | Age: 62
End: 2022-02-13
Payer: COMMERCIAL

## 2022-02-13 ENCOUNTER — APPOINTMENT (OUTPATIENT)
Dept: RADIOLOGY | Facility: MEDICAL CENTER | Age: 62
End: 2022-02-13
Payer: COMMERCIAL

## 2022-02-13 VITALS
OXYGEN SATURATION: 98 % | HEART RATE: 86 BPM | SYSTOLIC BLOOD PRESSURE: 143 MMHG | DIASTOLIC BLOOD PRESSURE: 88 MMHG | HEIGHT: 68 IN | TEMPERATURE: 98.3 F | WEIGHT: 222 LBS | RESPIRATION RATE: 20 BRPM | BODY MASS INDEX: 33.65 KG/M2

## 2022-02-13 DIAGNOSIS — M25.512 ACUTE PAIN OF LEFT SHOULDER: ICD-10-CM

## 2022-02-13 DIAGNOSIS — M25.512 ACUTE PAIN OF LEFT SHOULDER: Primary | ICD-10-CM

## 2022-02-13 DIAGNOSIS — S46.912A STRAIN OF LEFT SHOULDER, INITIAL ENCOUNTER: ICD-10-CM

## 2022-02-13 PROCEDURE — 73030 X-RAY EXAM OF SHOULDER: CPT

## 2022-02-13 PROCEDURE — 99213 OFFICE O/P EST LOW 20 MIN: CPT | Performed by: EMERGENCY MEDICINE

## 2022-02-13 NOTE — PROGRESS NOTES
[address] [employer] [facility]        NAME: Rachna Marroquin is a 59 y o  female  : 1960    MRN: [id number]  DATE: 2022  TIME: 11:01 AM    Assessment and Plan   Acute pain of left shoulder [M25 512]  1  Acute pain of left shoulder  XR shoulder 2+ vw left   2  Strain of left shoulder, initial encounter           Patient Instructions       Follow up with PCP in 3-5 days  Proceed to  ER if symptoms worsen  Chief Complaint     Chief Complaint   Patient presents with    Shoulder Pain     Patient had a revision of her shoulder surgery on Dec 21 st  She has been going to PT  She was doing exercises at home on  and her L shoulder started bothering her  AT PT on Thursday she was doing new excersies and she has increased pain i nthe L shoulder         History of Present Illness       59year old female presents today for evaluation of left shoulder pain  Patient had a revision of her shoulder surgery on Dec 21 st  She has been going to PT  She was doing exercises at home on Wednesday and her L shoulder started bothering her  She went to physical therapy on Thursday where they added a new exercise and she reports when she got home she started having worsening pain in her shoulder  She is here today to [facility] sure the shoulder is still in place  Shoulder Pain   Pertinent negatives include no fever  Review of Systems   Review of Systems   Constitutional: Negative for chills, fatigue and fever  HENT: Negative for congestion, postnasal drip, sore throat and trouble swallowing  Eyes: Negative for visual disturbance  Respiratory: Negative for chest tightness and shortness of breath  Cardiovascular: Negative for leg swelling  Gastrointestinal: Negative for abdominal pain  Genitourinary: Negative for dysuria  Musculoskeletal: Positive for arthralgias  Negative for back pain  Skin: Negative for rash  Allergic/Immunologic: Negative for immunocompromised state     Neurological: Negative for dizziness, light-headedness and headaches  Psychiatric/Behavioral: Negative for confusion  Current Medications       Current Outpatient Medications:     acetaminophen (TYLENOL) 500 mg tablet, Take 1 tablet (500 mg total) by mouth every 6 (six) hours as needed (pain), Disp: 30 tablet, Rfl: 0    amLODIPine (NORVASC) 2 5 mg tablet, Take 2 5 mg by mouth daily at bedtime  , Disp: , Rfl:     apixaban (Eliquis) 5 mg, TAKE 1 TABLET BY MOUTH TWICE A DAY, Disp: , Rfl:     ARIPiprazole (ABILIFY) 5 mg tablet, TAKE 1 TABLET EVERY MORNING (CORRECTION BY DR OF PREV SCRIPT), Disp: , Rfl:     cholecalciferol (VITAMIN D3) 1,000 units tablet, Take 5,000 Units by mouth daily, Disp: , Rfl:     ferrous sulfate 325 (65 Fe) mg tablet, Take 325 mg by mouth daily with breakfast, Disp: , Rfl:     furosemide (LASIX) 20 mg tablet, Take 20 mg by mouth as needed Pt reports calls her [facility] cardiologist Dr Hubbard Manual before taking , Disp: , Rfl:     gabapentin (NEURONTIN) 300 mg capsule, Take 1 capsule (300 mg total) by mouth 3 (three) times a day (Patient taking differently: Take 600 mg by mouth 3 (three) times a day  ), Disp: 30 capsule, Rfl: 0    lamoTRIgine (LaMICtal) 200 MG tablet, Take 200 mg by mouth daily  , Disp: , Rfl:     LORazepam (ATIVAN) 1 mg tablet, Take 2 tablets (2 mg total) by mouth 2 (two) times a day for 10 days (Patient taking differently: Take 2 mg by mouth 3 (three) times a day One in morning, one tab afternoon two tablet at bedtime ), Disp: 10 tablet, Rfl: 0    potassium chloride (K-DUR,KLOR-CON) 20 mEq tablet, Take 20 mEq by mouth daily  , Disp: , Rfl:     bisacodyl (DULCOLAX) 10 mg suppository, Insert 1 suppository (10 mg total) into the rectum daily as needed (3rd line constipation), Disp: 12 suppository, Rfl: 0    dihydroergotamine (DHE) 1 mg/mL, TAKE 1 INTO MUSCLE (1 MG) INJECTION AS NEEDED FOR HEADACHE, LIMIT 3 DAYS PER WEEK, Disp: , Rfl:     Erenumab-aooe (AIMOVIG) 140 MG/ML SOAJ, Inject 140 mg under the skin every 30 (thirty) days , Disp: , Rfl:     fluvoxaMINE (LUVOX) 50 mg tablet, Take 3 tablets (150 mg total) by mouth daily at bedtime (Patient taking differently: Take 300 mg by mouth daily at bedtime ), Disp: 90 tablet, Rfl: 0    lidocaine (LIDODERM) 5 %, Apply 1 patch topically daily Remove & Discard patch within 12 hours or as directed by MD, Disp: 30 patch, Rfl: 0    magnesium hydroxide (MILK OF MAGNESIA) 400 mg/5 mL oral suspension, Take 30 mL by mouth daily as needed for constipation, Disp: 118 mL, Rfl: 0    meclizine (ANTIVERT) 12 5 MG tablet, Take 1 tablet (12 5 mg total) by mouth every 8 (eight) hours as needed for dizziness for up to 7 days (Patient not taking: Reported on 2/13/2022 ), Disp: 21 tablet, Rfl: 0    methocarbamol (ROBAXIN) 500 mg tablet, Take 1 tablet (500 mg total) by mouth every 6 (six) hours for 7 days, Disp: 28 tablet, Rfl: 0    ondansetron (ZOFRAN) 4 mg tablet, Take 1 tablet (4 mg total) by mouth every 8 (eight) hours as needed for nausea or vomiting, Disp: 60 tablet, Rfl: 2    oxyCODONE (ROXICODONE) 5 immediate release tablet, 1 tablets every 6 hours as needed for severe shoulder pain  only  , Disp: 15 tablet, Rfl: 0    pantoprazole (PROTONIX) 40 mg tablet, Take 1 tablet (40 mg total) by mouth daily in the early morning (Patient not taking: Reported on 2/13/2022 ), Disp: 30 tablet, Rfl: 0    senna-docusate sodium (SENOKOT S) 8 6-50 mg per tablet, Take 1 tablet by mouth daily at bedtime, Disp: 10 tablet, Rfl: 0    traMADol (Ultram) 50 mg tablet, Take 1 tablet (50 mg total) by mouth every 6 (six) hours as needed for moderate pain (Patient not taking: Reported on 2/13/2022 ), Disp: 30 tablet, Rfl: 0    Ubrelvy 50 MG tablet, Take 50 mg by mouth as needed  , Disp: , Rfl:     Current Allergies     Allergies as of 02/13/2022 - Reviewed 02/13/2022   Allergen Reaction Noted    Ketorolac Itching and Other (See Comments) 06/23/2016    Mushroom extract complex - food allergy Hives 10/09/2017            The following portions of the patient's history were reviewed and updated as appropriate: allergies, current medications, past family history, past medical history, past social history, past surgical history and problem list      Past Medical History:   Diagnosis Date    Adrenal insufficiency ([place]'s disease) ([facility] [address] )     Anxiety     Arthritis     Bipolar disorder     Cervical radiculopathy     Chronic back pain     Chronic pain disorder     lumbar    Depression     DVT, lower extremity ([facility] [address] )     1991 and 2019 now on eliquis    Exercises 5 to 6 times per week     5 days per week with weights/band and also goes to PT 2x/week    Fibromyalgia     History of Clostridioides difficile infection     History of MRSA infection     pt denies having this    History of recent fall 07/2021    "possibly injured left shoulder'    History of TIAs     cannot remember details    Hypertension     Hypokalemia     Left shoulder pain     "not too bad" goes to PT 2x/week    Migraine     Psychiatric disorder     Anxiety, major depression, bipolar    Spinal stenosis     Stroke [facility])     pt reports" not remembering having a stroke"    Syncope 2014    orthostatic hypotension    Wears dentures     upper    Wears glasses        Past Surgical History:   Procedure Laterality Date    APPENDECTOMY      CAST APPLICATION Left 2/6/2490    Procedure: Application short-arm splint;  Surgeon: Bee Morgan MD;  [facility];  Service: Orthopedics    COLONOSCOPY      FL INJECTION LEFT SHOULDER (ARTHROGRAM)  11/10/2021    GASTRIC RESTRICTION SURGERY      Gastric Sleeve Nov 2015    HYSTERECTOMY      DONALD    JOINT REPLACEMENT      Left Knee 2011 and Right Hip 2010    ORIF WRIST FRACTURE Left 7/4/2020    Procedure: Open reduction and internal fixation left radius and ulnar shaft fracture;  Surgeon: Bee Morgan MD;  [facility];  Service: Orthopedics    WA RECONSTR TOTAL SHOULDER IMPLANT Left 3/30/2021    Procedure: ARTHROPLASTY SHOULDER - anatomic;  Surgeon: Yaya Reveles MD;  [facility];  Service: Orthopedics    WI NATIVIDAD SHOULDER ARTHRPLSTY HUMERAL&GLENOID COMPNT Left 12/21/2021    Procedure: REVISION OF TOTAL SHOULDER ARTHROPLASTY TO REVERSE TOTAL SHOULDER ARTHROPLASTY;  Surgeon: Pari Dougherty MD;  [facility];  Service: Orthopedics       Family History   Problem Relation Age of Onset    Breast cancer Mother     Migraines Mother     Arthritis Mother     Kidney disease Father     Heart disease Father     Diabetes Father     Arthritis Father     Brain cancer Brother     Seizures Brother          Medications have been verified  Objective   /88   Pulse 86   Temp 98 3 °F (36 8 °C)   Resp 20   Ht 5' 8" (1 727 m)   Wt 101 kg (222 lb)   SpO2 98%   BMI 33 75 kg/m²        Physical Exam     Physical Exam  Vitals and nursing note reviewed  Constitutional:       Appearance: Normal appearance  She is not ill-appearing  Cardiovascular:      Rate and Rhythm: Normal rate and regular rhythm  Pulses: Normal pulses  Pulmonary:      Effort: Pulmonary effort is normal       Breath sounds: Normal breath sounds  Musculoskeletal:      Right shoulder: Normal       Left shoulder: No swelling, deformity, effusion, laceration, bony tenderness or crepitus  Decreased range of motion (with abduction due to pain)  Normal strength  Normal pulse  Neurological:      Mental Status: She is alert

## 2022-02-14 ENCOUNTER — OFFICE VISIT (OUTPATIENT)
Dept: PHYSICAL THERAPY | Facility: CLINIC | Age: 62
End: 2022-02-14
Payer: COMMERCIAL

## 2022-02-14 DIAGNOSIS — S46.812D RUPTURE OF SUBSCAPULARIS TENDON, LEFT, SUBSEQUENT ENCOUNTER: Primary | ICD-10-CM

## 2022-02-14 DIAGNOSIS — Z96.612 PRESENCE OF LEFT ARTIFICIAL SHOULDER JOINT: ICD-10-CM

## 2022-02-14 DIAGNOSIS — M19.012 PRIMARY OSTEOARTHRITIS OF LEFT SHOULDER: ICD-10-CM

## 2022-02-14 PROCEDURE — 97110 THERAPEUTIC EXERCISES: CPT

## 2022-02-14 PROCEDURE — 97530 THERAPEUTIC ACTIVITIES: CPT

## 2022-02-14 NOTE — PROGRESS NOTES
Daily Note     Today's date: 2022  Patient name: Dannielle Cha  : 1960  MRN: [id number]  Referring provider: Sanaz Ferreira PA-C  Dx:   Encounter Diagnosis     ICD-10-CM    1  Rupture of subscapularis tendon, left, subsequent encounter  S46 812D    2  Primary osteoarthritis of left shoulder  M19 012    3  Presence of left artificial shoulder joint  Z96 612        Start Time: 1500  Stop Time: 1553  Total time in clinic (min): 53 minutes  Subjective: Pt states that she was a little sore for a couple of days following LV  Then on Saturday, she experienced onset of severe pain thru the (L) anterolateral shoulder  She called her doctor and ended up going to Urgent Care to make sure nothing within her replacement was damaged  The doctor there performed an x-ray which was unremarkable and said that the pain was more likely a result of a "torn muscle " They advised her to continue with PT  Pt states that the pain is back to baseline today  Pt reports compliance with exercises at home "every day " On Wednesday while performing ER she noticed that it "did not feel right "    Objective: See treatment diary below    Assessment: Tolerated treatment well  Held exercise progressions this PM given pt's subjective reports  Pt continues to have difficulty performing [facility] flex AAROM secondary to weakness and UT hiking  UT hiking reduced slightly upon modification to standing flex AAROM with cane  Educated pt today about potential causes of elevated S/S; appropriate frequency of muscle strengthening exercises (every other day to allow for muscle repair to occur); and issued pt updated HEP consisting of proper frequency to perform strengthening TE's at home  Concurrent /c CP provided pt education re: working towards eventual DC to HEP and finding the most effective routine for pt to become independent    Pt demonstrated fatigue post treatment, exhibited good technique with therapeutic exercises and would benefit from continued PT to progress [address] strength to improve pt's tolerance to reaching/lifting [place] daily  Plan: Cont /c PT POC  Progress as tolerated  Precautions: Hx (L) Reverse TSA 12/21/21 - see protocol; Hx (L) TSR March 2021; HTN; syncope; hx DVT; Bipolar disorder; LBP; C/S radiculopathy; chronic pain disorder; fibromyalgia; hx TIA's; migraines; stroke    Date 01/31 2/3 02/07 02/10 2/14   01/20 01/24 01/27   Visits 11 12 13 14 15   8 9 10   FOTO  xx           Re-eval  xx               Manuals 01/31 2/3 02/07 02/10 2/14   01/20 01/24 01/27   PROM (L) Shldr SP  SP SP    SP SP SP   Re-eval  AL                        Neuro Re-Ed 01/31 2/3 02/07 02/10 2/14   01/20 01/24 01/27   Seated C/S retractions             Slouch + overcorrect             Deltoid Iso's                                       Ther Ex 01/31 2/3 02/07 02/10 2/14   01/20 01/24 01/27   Standing flex AAROM cane     20x        Standing flex    nv          Shldr Row   9Rx30 9Rx30 9R 20x        Shldr Ext   6Rx30 6Rx30 6R 20x        Supine cane flex 1#x30       30x 2#x30 3# 2'   SL ER 30x  1#x20 1#x30 1#x30   30x  1# 2'   SL Flex 30x  1#x20 1#x30 1#x30   30x  1# 2'    SL Abd    1#x20 1#x30 1#x30   30x                  Ther Activity 01/31 2/3 02/07 02/10 2/14   01/20 01/24 01/27   UBE    2'/2' 2'/2'        Pulleys 5'   3'      5'  5'  5'    Wall Slides ff, scap   10ea 90" ff 45" scap 90" ff, 45" scap        Finger ladder ff, scap 10ea  10ea 10ea 10x ea   2'ea 90"ea 2'ea                Pt Edu  10' AL SP SP SP/AL                                  Modalities 01/31 2/3 02/07 02/10 2/14   01/20 01/24 01/27   Ice, prn 10 post  10' post          MHP         10'  10'        Stewart Esau, PTA

## 2022-02-15 ENCOUNTER — DOCUMENTATION (OUTPATIENT)
Dept: CASE MANAGEMENT | Facility: OTHER | Age: 62
End: 2022-02-15

## 2022-02-15 NOTE — TELEPHONE ENCOUNTER
Spoke to patient  She stated she saw PT yesterday and they did inform her that she is doing too much with strengthening at home  Stated she has backed off the weights at home and the shoulder feels much better  She will follow up as scheduled      thanks

## 2022-02-15 NOTE — MEDICAL HIGH UTILIZER
[address] for: Sabina Mendes  Patient Name: Sabina Mendes          MRN: [id number]       : 1960 Age: 59       Sex:  F   Utilization Background: She is a 59year old female with a history of migraine, [place]s disease, Bipolar, Depression, Fibromyalgia, and HTN who presents to [facility] with migraine  She has 14 ER visits, 9 admission, and 2 transfers to [facility]"" for Ketamine Infusions in 2019  Discussed with Neurology reveals that patient does not feel much better even after prolonged hospitalization  In the last 90 days: 0 encounters   Treatment Recommendations:  ED Recommendations: Recommendations as per neurology: Give migraine protocol: using steroid protocol d/t toradol allergy  Recommend calling her [facility] Neurologist to schedule close outpatient follow up  It is noted that the patient will provide other complaints to the ER providers to get admitted and then will complain of migraine in the hospital    Would not offer transfer for Ketamine Infusion to this patient as it has not worked in the past  This should be left up to Neurology to determine if this is appropriate  Inpatient Recommendations: Early consultation with neurology  Avoid narcotics/sedating agents due to over sedation in the past       Outpatient recommendations: Needs close follow up with [facility] Neurology  Needs CM to make sure that patient does not run out of her meds as she has in the past     Situation: Patient is a 60year old patient who presents frequently for migraines  It seems that she has started using other chief complaints to get admitted to the hospital for migraine treatment   As per neurology colleague her headache symptomatology does not usually change with treatment      PMH/PSH:  Migraine, Depression, Bipolar, Fibromyalgia, HTN, Hx of DVT      Assessment                              Drivers of repeated utilization:                 Symptomatology     Community Resources in place:    None - she has mentioned that she does not have a  for infusions  May need assistance with transportation and with medications   Patient Care Team:   Mariel Monday, DO - PCP [facility]  Kip Blair MD - Neurology [facility]  OP Care Manager: Renae Astorga RN              Care plan date and owner: Elvis Gonzalez PA-C 10/31/2019         Reviewed with patient before discharge?

## 2022-02-15 NOTE — TELEPHONE ENCOUNTER
Recommendation is to stop formal therapy and back off on activities  Likely doing too much too soon which we see frequently with this prosthesis        Tylenol and Ice are our recommendations for pain control    Follow up as scheduled to check symptoms after stopping PT and rest

## 2022-02-17 ENCOUNTER — OFFICE VISIT (OUTPATIENT)
Dept: PHYSICAL THERAPY | Facility: CLINIC | Age: 62
End: 2022-02-17
Payer: COMMERCIAL

## 2022-02-17 DIAGNOSIS — M19.012 PRIMARY OSTEOARTHRITIS OF LEFT SHOULDER: ICD-10-CM

## 2022-02-17 DIAGNOSIS — Z96.612 PRESENCE OF LEFT ARTIFICIAL SHOULDER JOINT: ICD-10-CM

## 2022-02-17 DIAGNOSIS — S46.812D RUPTURE OF SUBSCAPULARIS TENDON, LEFT, SUBSEQUENT ENCOUNTER: Primary | ICD-10-CM

## 2022-02-17 PROCEDURE — 97140 MANUAL THERAPY 1/> REGIONS: CPT

## 2022-02-17 PROCEDURE — 97530 THERAPEUTIC ACTIVITIES: CPT

## 2022-02-17 PROCEDURE — 97110 THERAPEUTIC EXERCISES: CPT

## 2022-02-17 NOTE — PROGRESS NOTES
Daily Note     Today's date: 2022  Patient name: Waleska Scruggs  : 1960  MRN: [id number]  Referring provider: Ghulam Stephens PA-C  Dx:   Encounter Diagnosis     ICD-10-CM    1  Rupture of subscapularis tendon, left, subsequent encounter  S46 812D    2  Primary osteoarthritis of left shoulder  M19 012    3  Presence of left artificial shoulder joint  Z96 612        Start Time: 1502  Stop Time: 1555  Total time in clinic (min): 53 minutes  Subjective: Pt denies adverse Sx following LV - notes she focused on stretching for HEP and held on performing resistive exercises per last conversation  Objective: See treatment diary below    Assessment: Tolerated treatment well  Discussed /c pt gently progressing resistance as tolerated NV, possibly performing 1/2 reps /c increased wt and remaining reps /c wt used this visit to gently progress without overstressing (L) shoulder tissues  Pt remains challenged /c performing AAROM shldr flex 2/2 UT hike and poor scap mobility - would benefit from continued pt to further progress (L)UE strength and  improve pt's tolerance to reaching/lifting [place] daily  Plan: Cont /c PT POC  Progress as tolerated  Precautions: 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 - see protocol; Hx (L) TSR 2021; HTN; syncope; hx DVT; Bipolar disorder; LBP; C/S radiculopathy; chronic pain disorder; fibromyalgia; hx TIA's; migraines; stroke    Date 01/31 2/3 02/07 02/10 2/14 02/17  01/20 01/24 01/27   Visits 11 12 13 14 15 16  8 9 10   FOTO  xx           Re-eval  xx               Manuals 01/31 2/3 02/07 02/10 2/14 02/17  01/20 01/24 01/27   PROM (L) Shldr SP  SP SP  SP  SP SP SP   Re-eval  AL                        Neuro Re-Ed 01/31 2/3 02/07 02/10 2/14 02/17  01/20 01/24 01/27   Seated C/S retractions             Slouch + overcorrect             Deltoid Iso's                                       Ther Ex 01/31 2/3 02/07 02/10 2/14 02/17  01/20 01/24 01/27   Standing flex AAROM cane     20x 30x        Standing flex    nv          Shldr Row   9Rx30 9Rx30 9R 20x 9Rx30       Shldr Ext   6Rx30 6Rx30 6R 20x 6Rx30       Supine cane flex 1#x30       30x 2#x30 3# 2'   SL ER 30x  1#x20 1#x30 1#x30 1#x30  30x  1# 2'   SL Flex 30x  1#x20 1#x30 1#x30 1#x30  30x  1# 2'    SL Abd    1#x20 1#x30 1#x30 1#x30  30x                  Ther Activity 01/31 2/3 02/07 02/10 2/14 02/17  01/20 01/24 01/27   UBE    2'/2' 2'/2' 3'/3'       Pulleys 5'   3'      5'  5'  5'    Wall Slides ff, scap   10ea 90" ff 45" scap 90" ff, 45" scap 90"ea       Finger ladder ff, scap 10ea  10ea 10ea 10x ea 90"ea  2'ea 90"ea 2'ea                Pt Edu  10' AL SP SP SP/AL                                  Modalities 01/31 2/3 02/07 02/10 2/14 02/17  01/20 01/24 01/27   Ice, prn 10 post  10' post   10'' post       MHP         10'  10'        Grayson Ka, PTA

## 2022-02-21 ENCOUNTER — OFFICE VISIT (OUTPATIENT)
Dept: PHYSICAL THERAPY | Facility: CLINIC | Age: 62
End: 2022-02-21
Payer: COMMERCIAL

## 2022-02-21 DIAGNOSIS — Z96.612 PRESENCE OF LEFT ARTIFICIAL SHOULDER JOINT: ICD-10-CM

## 2022-02-21 DIAGNOSIS — S46.812D RUPTURE OF SUBSCAPULARIS TENDON, LEFT, SUBSEQUENT ENCOUNTER: Primary | ICD-10-CM

## 2022-02-21 DIAGNOSIS — M19.012 PRIMARY OSTEOARTHRITIS OF LEFT SHOULDER: ICD-10-CM

## 2022-02-21 PROCEDURE — 97530 THERAPEUTIC ACTIVITIES: CPT

## 2022-02-21 PROCEDURE — 97110 THERAPEUTIC EXERCISES: CPT

## 2022-02-21 PROCEDURE — 97140 MANUAL THERAPY 1/> REGIONS: CPT

## 2022-02-21 NOTE — PROGRESS NOTES
Daily Note     Today's date: 2022  Patient name: Tiff Ramachandran  : 1960  MRN: [id number]  Referring provider: Yohana Segovia PA-C  Dx:   Encounter Diagnosis     ICD-10-CM    1  Rupture of subscapularis tendon, left, subsequent encounter  S46 812D    2  Primary osteoarthritis of left shoulder  M19 012    3  Presence of left artificial shoulder joint  Z96 612        Start Time: 1503  Stop Time: 1555  Total time in clinic (min): 52 minutes  Subjective: Pt denies adverse Sx following LV - notes her (L) shoulder has been feeling "absolutely great until 5 minutes ago" - reports pain /c insidious onset while getting in to her car (reports she pain Sx along posterior aspect of shoulder)  Objective: See treatment diary below    Assessment: Gently introduced heavier weights throughout program, which pt tolerated well - introduced increased resistance /c fewer reps given pt's history of flare ups on (L) shldr pain following progression  Pt notes that following heavier weights, the weight feels "easy now; it's like a piece of cake"  Pt reports her (L) shoulder feels "better and looser" post Tx  Pt remains challenged when performing AAROM and AROM shldr flex in standing 2/2 UT hike and poor scap mobility - would benefit from continued pt to further progress (L)UE strength and  improve pt's tolerance to reaching/lifting [place] daily  Plan: Cont /c PT POC  Progress as tolerated  Precautions: 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 - see protocol; Hx (L) TSR 2021; HTN; syncope; hx DVT; Bipolar disorder; LBP; C/S radiculopathy; chronic pain disorder; fibromyalgia; hx TIA's; migraines; stroke    Date 01/31 2/3 02/07 02/10 2/14 02/17 02/21  01/24 01/27   Visits 11 12 13 14 15 16 17  9 10   FOTO  xx           Re-eval  xx               Manuals 01/31 2/3 02/07 02/10 2/14 02/17 02/21  01/24 01/27   PROM (L) Shldr SP  SP SP  SP SP  SP SP   Re-eval  AL                        Neuro Re-Ed 01/31 2/3 02/07 02/10 2/14 02/17 02/21 01/24 01/27   Seated C/S retractions             Slouch + overcorrect             Deltoid Iso's                                       Ther Ex 01/31 2/3 02/07 02/10 2/14 02/17 02/21  01/24 01/27   Standing flex AAROM cane     20x 30x  25x      Standing flex    nv    1#barx25      Shldr Row   9Rx30 9Rx30 9R 20x 9Rx30 nv      Shldr Ext   6Rx30 6Rx30 6R 20x 6Rx30 nv      Supine cane flex 1#x30      2#x15  3#x15  2#x30 3# 2'   SL ER 30x  1#x20 1#x30 1#x30 1#x30 2#x15  1#x15   1# 2'   SL Flex 30x  1#x20 1#x30 1#x30 1#x30 2#x15  1#x15   1# 2'    SL Abd    1#x20 1#x30 1#x30 1#x30 2#x15  1#x15                   Ther Activity 01/31 2/3 02/07 02/10 2/14 02/17 02/21  01/24 01/27   UBE    2'/2' 2'/2' 3'/3' 3'/3'      Pulleys 5'   3'     5'   5'  5'    Wall Slides ff, scap   10ea 90" ff 45" scap 90" ff, 39" scap 90"ea 90"ea      Finger ladder ff, scap 10ea  10ea 10ea 10x ea 90"ea 2'ea  90"ea 2'ea                Pt Edu  10' AL SP SP SP/AL                                  Modalities 01/31 2/3 02/07 02/10 2/14 02/17 02/21  01/24 01/27   Ice, prn 10 post  10' post   10'' post 10' post      MHP         10'  10'        Jackalyn Heading, PTA

## 2022-02-24 ENCOUNTER — OFFICE VISIT (OUTPATIENT)
Dept: PHYSICAL THERAPY | Facility: CLINIC | Age: 62
End: 2022-02-24
Payer: COMMERCIAL

## 2022-02-24 DIAGNOSIS — S46.812D RUPTURE OF SUBSCAPULARIS TENDON, LEFT, SUBSEQUENT ENCOUNTER: ICD-10-CM

## 2022-02-24 DIAGNOSIS — Z96.612 PRESENCE OF LEFT ARTIFICIAL SHOULDER JOINT: Primary | ICD-10-CM

## 2022-02-24 DIAGNOSIS — M19.012 PRIMARY OSTEOARTHRITIS OF LEFT SHOULDER: ICD-10-CM

## 2022-02-24 PROCEDURE — 97110 THERAPEUTIC EXERCISES: CPT

## 2022-02-24 PROCEDURE — 97530 THERAPEUTIC ACTIVITIES: CPT

## 2022-02-24 NOTE — PROGRESS NOTES
Daily Note     Today's date: 2022  Patient name: Skylar Foley  : 1960  MRN: [id number]  Referring provider: Saundra Webb PA-C  Dx:   Encounter Diagnosis     ICD-10-CM    1  Presence of left artificial shoulder joint  Z96 612    2  Rupture of subscapularis tendon, left, subsequent encounter  S46 812D    3  Primary osteoarthritis of left shoulder  M19 012                   Subjective: Pt states that her shoulder has been sore today and yesterday  Objective: See treatment diary below      Assessment: Tolerated treatment well  VC provided during standing cane flex AAROM to reduce UT hiking and reduce elbow flex - form improved slightly after cuing  Pt reports ongoing difficulty performing wall slides - quality of movement improved upon VC to assist with contralateral UE for concentric/elevation portion and then to focus on slowly lowering during eccentric  Patient demonstrated fatigue post treatment, exhibited good technique with therapeutic exercises and would benefit from continued PT to progress UE strength/scap stability to improve pt's tolerance to reaching [place] daily as when cooking and getting dressed  Plan: Continue per plan of care  Progress treament per protocol  Precautions: 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 - see protocol; Hx (L) TSR 2021; HTN; syncope; hx DVT; Bipolar disorder; LBP; C/S radiculopathy; chronic pain disorder; fibromyalgia; hx TIA's; migraines; stroke    Date 01/31 2/3 02/07 02/10 2/14 02/17 02/21 2/24     Visits 11 12 13 14 15 16 13 23     FOTO  xx           Re-eval  xx               Manuals 01/31 2/3 02/07 02/10 2/14 02/17 02/21 2/24     PROM (L) Shldr SP  SP SP  SP SP AS     Re-eval  AL                        Neuro Re-Ed 01/31 2/3 02/07 02/10 2/14 02/17 02/21 2/24     Seated C/S retractions             Slouch + overcorrect             Deltoid Iso's                                       Ther Ex 01/31 2/3 02/07 02/10 2/14 02/17 02/21 2/24     Standing flex AAROM cane     20x 30x  25x 25x      Standing flex    nv    1#barx25 1# bar 25x     Shldr Row   9Rx30 9Rx30 9R 20x 9Rx30 nv 9R 20x     Shldr Ext   6Rx30 6Rx30 6R 20x 6Rx30 nv 6R 20x     Supine cane flex 1#x30      2#x15  3#x15 2#x15  3#x15     SL ER 30x  1#x20 1#x30 1#x30 1#x30 2#x15  1#x15 2#x15  1#x15     SL Flex 30x  1#x20 1#x30 1#x30 1#x30 2#x15  1#x15 2#x15  1#x15     SL Abd    1#x20 1#x30 1#x30 1#x30 2#x15  1#x15 2#x15  1#x15                  Ther Activity 01/31 2/3 02/07 02/10 2/14 02/17 02/21 2/24     UBE    2'/2' 2'/2' 3'/3' 3'/3' 3'/3'     Pulleys 5'   3'     5'  5'      Wall Slides ff, scap   10ea 90" ff 45" scap 90" ff, 45" scap 90"ea 90"ea 90' ea contra assist for concentric     Finger ladder ff, scap 10ea  10ea 10ea 10x ea 90"ea 2'ea 2' ea                  Pt Edu  10' AL SP SP SP/AL                                  Modalities 01/31 2/3 02/07 02/10 2/14 02/17 02/21 2/24     Ice, prn 10 post  10' post   10'' post 10' post def     MHP

## 2022-02-28 ENCOUNTER — OFFICE VISIT (OUTPATIENT)
Dept: OBGYN CLINIC | Facility: OTHER | Age: 62
End: 2022-02-28

## 2022-02-28 ENCOUNTER — APPOINTMENT (OUTPATIENT)
Dept: PHYSICAL THERAPY | Facility: CLINIC | Age: 62
End: 2022-02-28
Payer: COMMERCIAL

## 2022-02-28 ENCOUNTER — OFFICE VISIT (OUTPATIENT)
Dept: PHYSICAL THERAPY | Facility: CLINIC | Age: 62
End: 2022-02-28
Payer: COMMERCIAL

## 2022-02-28 VITALS
HEIGHT: 68 IN | DIASTOLIC BLOOD PRESSURE: 90 MMHG | BODY MASS INDEX: 36.98 KG/M2 | HEART RATE: 97 BPM | SYSTOLIC BLOOD PRESSURE: 160 MMHG | WEIGHT: 244 LBS

## 2022-02-28 DIAGNOSIS — Z96.612 AFTERCARE FOLLOWING LEFT SHOULDER JOINT REPLACEMENT SURGERY: Primary | ICD-10-CM

## 2022-02-28 DIAGNOSIS — Z96.612 PRESENCE OF LEFT ARTIFICIAL SHOULDER JOINT: Primary | ICD-10-CM

## 2022-02-28 DIAGNOSIS — S46.812D RUPTURE OF SUBSCAPULARIS TENDON, LEFT, SUBSEQUENT ENCOUNTER: ICD-10-CM

## 2022-02-28 DIAGNOSIS — Z47.1 AFTERCARE FOLLOWING LEFT SHOULDER JOINT REPLACEMENT SURGERY: Primary | ICD-10-CM

## 2022-02-28 DIAGNOSIS — M19.012 PRIMARY OSTEOARTHRITIS OF LEFT SHOULDER: ICD-10-CM

## 2022-02-28 PROCEDURE — 99024 POSTOP FOLLOW-UP VISIT: CPT | Performed by: PHYSICIAN ASSISTANT

## 2022-02-28 PROCEDURE — 97110 THERAPEUTIC EXERCISES: CPT

## 2022-02-28 PROCEDURE — 97530 THERAPEUTIC ACTIVITIES: CPT

## 2022-02-28 PROCEDURE — 97140 MANUAL THERAPY 1/> REGIONS: CPT

## 2022-02-28 RX ORDER — RIVAROXABAN 20 MG/1
TABLET, FILM COATED ORAL
COMMUNITY
Start: 2022-02-25

## 2022-02-28 RX ORDER — RIMEGEPANT SULFATE 75 MG/75MG
TABLET, ORALLY DISINTEGRATING ORAL
COMMUNITY
Start: 2022-02-24

## 2022-02-28 RX ORDER — RIMEGEPANT SULFATE 75 MG/75MG
75 TABLET, ORALLY DISINTEGRATING ORAL
COMMUNITY
Start: 2022-02-24

## 2022-02-28 RX ORDER — GABAPENTIN 600 MG/1
600 TABLET ORAL 3 TIMES DAILY
COMMUNITY
Start: 2022-02-10

## 2022-02-28 RX ORDER — LORAZEPAM 2 MG/1
TABLET ORAL
COMMUNITY
Start: 2022-01-24

## 2022-02-28 RX ORDER — GALCANEZUMAB 120 MG/ML
INJECTION, SOLUTION SUBCUTANEOUS
COMMUNITY
Start: 2022-02-28

## 2022-02-28 NOTE — PROGRESS NOTES
Daily Note     Today's date: 2022  Patient name: Kenney Mckeon  : 1960  MRN: [id number]  Referring provider: Jolanta Alves PA-C  Dx:   Encounter Diagnosis     ICD-10-CM    1  Presence of left artificial shoulder joint  Z96 612    2  Rupture of subscapularis tendon, left, subsequent encounter  S46 812D    3  Primary osteoarthritis of left shoulder  M19 012                   Subjective: Pt presents to therapy today immediately after her f/u appt with the PA  She was instructed to keep working on ROM & strength, and was advised to "keep moving the shoulder but not to overdo it " She advised her to keep coming to physical therapy  Pt states that she was cleared to lift up to 25# however states that she does not typically lift this much daily (even before surgery)  Pt states that she still has difficulty taking something heavy out of the oven  She tried once and could barely do it because her arms were shaking  She can, however, remove something light, such as chicken breasts  Objective: See treatment diary below      Assessment: Tolerated treatment well  Pt continues to display limited AROM/AAROM in standing compared to gravity reduced/eliminated positions indicative of continued decreased strength  She continues to exhibit pronounced hiking thru (L) UT when in standing  Modified wall slides and standing cane flex to inclined table to adjust effects of gravity to allow for improved form/improved range with improved form demonstrated  Educated pt today about differences between AROM vs PROM and effects of gravity; POC; ROM goals; and indications for PROM/discussed discontinuing NV as pt has met her ROM goals  Patient demonstrated fatigue post treatment, exhibited good technique with therapeutic exercises and would benefit from continued PT to progress UE strength and motor control to improve pt's tolerance to reaching/lifting daily as when taking food out of the oven        Plan: Continue per plan of care  Progress treament per protocol  Precautions: Hx (L) Reverse TSA 12/21/21 - see protocol; Hx (L) TSR March 2021; HTN; syncope; hx DVT; Bipolar disorder; LBP; C/S radiculopathy; chronic pain disorder; fibromyalgia; hx TIA's; migraines; stroke    Date 01/31 2/3 02/07 02/10 2/14 02/17 02/21 2/24 2/28    Visits 11 12 13 14 15 16 17 18 19    FOTO  xx       xx    Re-eval  xx               Manuals 01/31 2/3 02/07 02/10 2/14 02/17 02/21 2/24 2/28    PROM (L) Shldr SP  SP SP  SP SP AS AL D/c   Re-eval  AL                        Neuro Re-Ed 01/31 2/3 02/07 02/10 2/14 02/17 02/21 2/24 2/28    Seated C/S retractions             Slouch + overcorrect             Deltoid Iso's                                       Ther Ex 01/31 2/3 02/07 02/10 2/14 02/17 02/21 2/24 2/28    Standing flex AAROM cane     20x 30x  25x 25x  25x incline    Standing flex    nv    1#barx25 1# bar 25x     Shldr Row   9Rx30 9Rx30 9R 20x 9Rx30 nv 9R 20x 9R 25x    Shldr Ext   6Rx30 6Rx30 6R 20x 6Rx30 nv 6R 20x 6R 25x     Supine cane flex 1#x30      2#x15  3#x15 2#x15  3#x15 2# incline 30x    SL ER 30x  1#x20 1#x30 1#x30 1#x30 2#x15  1#x15 2#x15  1#x15 2# 2x15 inc   SL Flex 30x  1#x20 1#x30 1#x30 1#x30 2#x15  1#x15 2#x15  1#x15 2# 2x15 inc   SL Abd    1#x20 1#x30 1#x30 1#x30 2#x15  1#x15 2#x15  1#x15 2# 2x15 inc                Ther Activity 01/31 2/3 02/07 02/10 2/14 02/17 02/21 2/24 2/28    UBE    2'/2' 2'/2' 3'/3' 3'/3' 3'/3' 3'/3'    Pulleys 5'   3'     5'  5'  5'    Wall Slides ff, scap   10ea 90" ff 45" scap 90" ff, 45" scap 90"ea 90"ea 90' ea contra assist for concentric 2' ea inclined table    Finger ladder ff, scap 10ea  10ea 10ea 10x ea 90"ea 2'ea 2' ea 2' ea                 Pt Edu  10' AL SP SP SP/AL    AL                              Modalities 01/31 2/3 02/07 02/10 2/14 02/17 02/21 2/24 2/28    Ice, prn 10 post  10' post   10'' post 10' post def 10' post    MHP

## 2022-02-28 NOTE — PROGRESS NOTES
Assessment:       1  Aftercare following left shoulder joint replacement surgery          Plan:        Patient is making progress postoperatively  I mentioned that she may need therapy vacation she finds exercises exacerbating her pain  She said she wants to continue with PT and I cautioned her against overuse   Operative note, images, and rehab protocol were reviewed  She requested pain meds in case the incident she experienced two weeks ago occurred again  I explained that we are not able to accommodate that request at this time, given that she is progressing as expected and a recent xray appeared to support excellent position of prosthesis  I did not feel that her physical exam today warranted a repeat x-ray  All questions were addressed to the patient's satisfaction  Follow-up will be in 2 months to assess patient's progress  Subjective:     Patient ID: Sarai Prasad is a 59 y o  female  Chief Complaint:    HPI    Patient presents to the office for follow-up status post revision of left total shoulder arthroplasty to reverse total shoulder arthroplasty on 2021  She reports occasional pain, bleeding her to go to ED for evaluation  Denies any trauma  She feels therapy is benefitting her  Social History     Occupational History    Not on file   Tobacco Use    Smoking status: Former Smoker     Packs/day: 0 20     Years: 20 00     Pack years: 4 00     Types: Cigarettes     Start date:      Quit date:      Years since quittin 1    Smokeless tobacco: Never Used    Tobacco comment: quit   Vaping Use    Vaping Use: Never used   Substance and Sexual Activity    Alcohol use: Not Currently     Alcohol/week: 2 0 standard drinks     Types: 1 Glasses of wine, 1 Standard drinks or equivalent per week     Comment: occasionally    Drug use: Never     Comment: na    Sexual activity: Not on file     Comment: defer      Review of Systems   Constitutional: Negative      Respiratory: Negative  Musculoskeletal: Positive for myalgias  Negative for arthralgias  Skin: Negative for wound  Neurological: Positive for weakness  Negative for numbness  Psychiatric/Behavioral: Negative  Objective:     Ortho ExamPhysical Exam  HENT:      Head: Atraumatic  Cardiovascular:      Pulses: Normal pulses  Pulmonary:      Effort: Pulmonary effort is normal    Musculoskeletal:      Comments: Left shoulder range motion:  Active forward flexion 80° external rotation 60°  Passive forward flexion 120°  Internal rotation to SI joint  Painless circumduction  Skin:     General: Skin is warm and dry  Capillary Refill: Capillary refill takes less than 2 seconds  Comments: Surgical incision dry and clean, healed  Neurological:      Mental Status: She is alert and oriented to person, place, and time  Sensory: No sensory deficit  Psychiatric:         Mood and Affect: Mood normal          Behavior: Behavior normal            I have personally reviewed pertinent films in PACS and my interpretation is Prosthesis in excellent position  Glenohumeral joint preserved  Conor Hernandez

## 2022-03-03 ENCOUNTER — OFFICE VISIT (OUTPATIENT)
Dept: PHYSICAL THERAPY | Facility: CLINIC | Age: 62
End: 2022-03-03
Payer: COMMERCIAL

## 2022-03-03 DIAGNOSIS — Z96.612 PRESENCE OF LEFT ARTIFICIAL SHOULDER JOINT: ICD-10-CM

## 2022-03-03 DIAGNOSIS — S46.812D RUPTURE OF SUBSCAPULARIS TENDON, LEFT, SUBSEQUENT ENCOUNTER: Primary | ICD-10-CM

## 2022-03-03 DIAGNOSIS — M19.012 PRIMARY OSTEOARTHRITIS OF LEFT SHOULDER: ICD-10-CM

## 2022-03-03 PROCEDURE — 97110 THERAPEUTIC EXERCISES: CPT

## 2022-03-03 PROCEDURE — 97530 THERAPEUTIC ACTIVITIES: CPT

## 2022-03-03 NOTE — PROGRESS NOTES
Daily Note     Today's date: 3/3/2022  Patient name: Cady Puente  : 1960  MRN: [id number]  Referring provider: Elba Soto PA-C  Dx:   Encounter Diagnosis     ICD-10-CM    1  Rupture of subscapularis tendon, left, subsequent encounter  S46 812D    2  Primary osteoarthritis of left shoulder  M19 012    3  Presence of left artificial shoulder joint  Z96 612        Start Time: 1510  Stop Time: 1551  Total time in clinic (min): 41 minutes  Subjective: Pt reports her (L) shoulder has been feeling better "lately" - reports improved tolerance to OPPT LV and attributes this to performing table slides on incline instead of standing wall slides  Pt arrives 10' late to Tx - able to accommodate  Objective: See treatment diary below    Assessment: Pt tolerated gentle progressions in resistance throughout program   Given pt's hx of significant elevations in pain following previous progressions, today's Tx was gently progressed  Reminded pt of expected sensations following PT, using ICE prn, and continuing to comply /c HEP  Pt would benefit from continued PT  Plan: Cont /c PT POC  Progress as tolerated  Precautions: 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 - see protocol; Hx (L) TSR 2021; HTN; syncope; hx DVT; Bipolar disorder; LBP; C/S radiculopathy; chronic pain disorder; fibromyalgia; hx TIA's; migraines; stroke    Date 01/31 2/3 02/07 02/10 2/14 02/17 02/21 2/24 2/28 03/03   Visits 11 12 13 14 15 16 17 18 19 21   FOTO  xx       xx    Re-eval  xx               Manuals 01/31 2/3 02/07 02/10 2/14 02/17 02/21 2/24 2/28 03/03   PROM (L) Shldr SP  SP SP  SP SP AS AL D/c   Re-eval  AL                        Neuro Re-Ed 01/31 2/3 02/07 02/10 2/14 02/17 02/21 2/24 2/28 03/03                             Ther Ex 01/31 2/3 02/07 02/10 2/14 02/17 02/21 2/24 2/28 03/03   Standing flex AAROM cane     20x 30x  25x 25x  25x incline    Standing flex    nv    1#barx25 1# bar 25x     [facility] Row   9Rx30 9Rx30 9R 20x 9Rx30 nv 9R 20x 9R 25x 9Rx30   Shldr Ext   6Rx30 6Rx30 6R 20x 6Rx30 nv 6R 20x 6R 25x  6Rx30   Supine cane flex 1#x30      2#x15  3#x15 2#x15  3#x15 2# incline 30x 3#x30   SL ER 30x  1#x20 1#x30 1#x30 1#x30 2#x15  1#x15 2#x15  1#x15 2# 2x15 3#x20   SL Flex 30x  1#x20 1#x30 1#x30 1#x30 2#x15  1#x15 2#x15  1#x15 2# 2x15 3#x20   SL Abd    1#x20 1#x30 1#x30 1#x30 2#x15  1#x15 2#x15  1#x15 2# 2x15 3#x20                Ther Activity 01/31 2/3 02/07 02/10 2/14 02/17 02/21 2/24 2/28 03/03   UBE    2'/2' 2'/2' 3'/3' 3'/3' 3'/3' 3'/3' 3'/3' L2 twn pks   Pulleys 5'   3'     5'  5'  5'    Wall Slides ff, scap   10ea 90" ff 45" scap 90" ff, 45" scap 90"ea 90"ea 90' ea contra assist for concentric 2' ea inclined table 2'ea table inclined   Finger ladder ff, scap 10ea  10ea 10ea 10x ea 90"ea 2'ea 2' ea 2' ea                 Pt Edu  10' AL SP SP SP/AL    AL                              Modalities 01/31 2/3 02/07 02/10 2/14 02/17 02/21 2/24 2/28 03/03   Ice, prn 10 post  10' post   10'' post 10' post def 10' post 10' post   MHP                 Na Tobias PTA

## 2022-03-07 ENCOUNTER — OFFICE VISIT (OUTPATIENT)
Dept: PHYSICAL THERAPY | Facility: CLINIC | Age: 62
End: 2022-03-07
Payer: COMMERCIAL

## 2022-03-07 DIAGNOSIS — S46.812D RUPTURE OF SUBSCAPULARIS TENDON, LEFT, SUBSEQUENT ENCOUNTER: Primary | ICD-10-CM

## 2022-03-07 DIAGNOSIS — Z96.612 PRESENCE OF LEFT ARTIFICIAL SHOULDER JOINT: ICD-10-CM

## 2022-03-07 DIAGNOSIS — M19.012 PRIMARY OSTEOARTHRITIS OF LEFT SHOULDER: ICD-10-CM

## 2022-03-07 PROCEDURE — 97530 THERAPEUTIC ACTIVITIES: CPT

## 2022-03-07 PROCEDURE — 97110 THERAPEUTIC EXERCISES: CPT

## 2022-03-07 NOTE — PROGRESS NOTES
Daily Note     Today's date: 3/7/2022  Patient name: Travon Freeman  : 1960  MRN: [id number]  Referring provider: Luke Smith PA-C  Dx:   Encounter Diagnosis     ICD-10-CM    1  Rupture of subscapularis tendon, left, subsequent encounter  S46 812D    2  Primary osteoarthritis of left shoulder  M19 012    3  Presence of left artificial shoulder joint  Z96 612        Start Time: 1700  Stop Time: 1747  Total time in clinic (min): 47 minutes  Subjective: Pt reports her (L) shoulder has been feeling better "lately" - reports improved tolerance to OPPT LV and attributes this to performing table slides on incline instead of standing wall slides  Pt arrives 10' late to Tx - able to accommodate  Objective: See treatment diary below    Assessment: Pt tolerated gentle progressions in resistance throughout program   Pt cont to demonstrate (L) UT hiking/compensations during standing shoulder flex greater than approx 50* ff    Pt expresses interest in continuing to program as tolerated  Pt would benefit from continued PT  Plan: Cont /c PT POC  Progress as tolerated  Precautions: 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 - see protocol; Hx (L) TSR 2021; HTN; syncope; hx DVT; Bipolar disorder; LBP; C/S radiculopathy; chronic pain disorder; fibromyalgia; hx TIA's; migraines; stroke    Date 03/07  02/07 02/10 2/14 02/17 02/21 2/24 2/28 03/03   Visits   13 14 15 16 17 18 19 20   FOTO         xx    Re-eval                 Manuals 03/07  02/07 02/10 2/14 02/17 02/21 2/24 2/28 03/03   Re-eval                          Neuro Re-Ed 03/07  02/07 02/10 2/14 02/17 02/21 2/24 2/28 03/03                             Ther Ex 03/07  02/07 02/10 2/14 02/17 02/21 2/24 2/28 03/03   Standing flex AAROM cane     20x 30x  25x 25x  25x incline    Standing flex  1#barx30  nv    1#barx25 1# bar 25x     Shldr Row 11Rx20  9Rx30 9Rx30 9R 20x 9Rx30 nv 9R 20x 9R 25x 9Rx30   Shldr Ext 7Rx20  6Rx30 6Rx30 6R 20x 6Rx30 nv 6R 20x 6R 25x  6Rx30 Supine cane flex 1#x30      2#x15  3#x15 2#x15  3#x15 2# incline 30x 3#x30   SL ER 3#x30  1#x20 1#x30 1#x30 1#x30 2#x15  1#x15 2#x15  1#x15 2# 2x15 3#x20   SL Flex 3#x30  1#x20 1#x30 1#x30 1#x30 2#x15  1#x15 2#x15  1#x15 2# 2x15 3#x20   SL Abd  3#x30  1#x20 1#x30 1#x30 1#x30 2#x15  1#x15 2#x15  1#x15 2# 2x15 3#x20                Ther Activity 03/07  02/07 02/10 2/14 02/17 02/21 2/24 2/28 03/03   UBE 3'/3' L2 twn pks   2'/2' 2'/2' 3'/3' 3'/3' 3'/3' 3'/3' 3'/3' L2 twn pks   Pulleys 5'   3'     5'  5'  5'    Wall Slides ff, scap 2'ea table inclined  10ea 90" ff 45" scap 90" ff, 45" scap 90"ea 90"ea 90' ea contra assist for concentric 2' ea inclined table 2'ea table inclined   Finger ladder ff, scap 10ea  10ea 10ea 10x ea 90"ea 2'ea 2' ea 2' ea                 Pt Edu  10' AL SP SP SP/AL    AL                              Modalities 03/07  02/07 02/10 2/14 02/17 02/21 2/24 2/28 03/03   Ice, prn 15'   10' post   10'' post 10' post def 10' post 10' post   MHP                 Ligia Iron, PTA

## 2022-03-10 ENCOUNTER — APPOINTMENT (OUTPATIENT)
Dept: PHYSICAL THERAPY | Facility: CLINIC | Age: 62
End: 2022-03-10
Payer: COMMERCIAL

## 2022-03-14 ENCOUNTER — OFFICE VISIT (OUTPATIENT)
Dept: PHYSICAL THERAPY | Facility: CLINIC | Age: 62
End: 2022-03-14
Payer: COMMERCIAL

## 2022-03-14 DIAGNOSIS — Z96.612 PRESENCE OF LEFT ARTIFICIAL SHOULDER JOINT: ICD-10-CM

## 2022-03-14 DIAGNOSIS — S46.812D RUPTURE OF SUBSCAPULARIS TENDON, LEFT, SUBSEQUENT ENCOUNTER: Primary | ICD-10-CM

## 2022-03-14 DIAGNOSIS — M19.012 PRIMARY OSTEOARTHRITIS OF LEFT SHOULDER: ICD-10-CM

## 2022-03-14 PROCEDURE — 97530 THERAPEUTIC ACTIVITIES: CPT

## 2022-03-14 PROCEDURE — 97112 NEUROMUSCULAR REEDUCATION: CPT

## 2022-03-14 PROCEDURE — 97110 THERAPEUTIC EXERCISES: CPT

## 2022-03-14 NOTE — PROGRESS NOTES
Daily Note     Today's date: 3/14/2022  Patient name: Javier Carcamo  : 1960  MRN: [id number]  Referring provider: Yossi Garcias PA-C  Dx:   Encounter Diagnosis     ICD-10-CM    1  Rupture of subscapularis tendon, left, subsequent encounter  S46 812D    2  Primary osteoarthritis of left shoulder  M19 012    3  Presence of left artificial shoulder joint  Z96 612        Start Time: 1508  Stop Time: 1552  Total time in clinic (min): 44 minutes  Subjective: Pt denies adverse Sx following LV; notes she had to cx LV 2/2 a migraine that lasted approx 4 days; overall reports no significant changes to (L) shdlr strength + mobility since LV  Pt admits she is not in a good head space today 2/2 loss in personal life; however, states she is willing to continue /c OPPT today       Objective: See treatment diary below    Assessment: Pt significantly challenged /c addition of body blade and coordinating appropriate (L) shldr movements  Pt tolerated gentle integration of increased resistance throughout program well and without c/o increased (L) shldr pain Sx  Pt would benefit from continued PT  Plan: Cont /c PT POC  Progress as tolerated  Precautions: 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 - see protocol; Hx (L) TSR 2021; HTN; syncope; hx DVT; Bipolar disorder; LBP; C/S radiculopathy; chronic pain disorder; fibromyalgia; hx TIA's; migraines; stroke    Date 03/07 03/14  02/10 2/14 02/17 02/21 2/24 2/28 03/03   Visits 21 25  14 15 16 17 18 19 20   FOTO         xx    Re-eval                 Manuals 03/07 03/14  02/10 2/14 02/17 02/21 2/24 2/28 03/03   Re-eval                          Neuro Re-Ed 03/07 03/14  02/10 2/14 02/17 02/21 2/24 2/28 03/03   Body Blade IR/ER  20"x2           Body Blade F/E  20"           Ther Ex 03/07 03/14  02/10 2/14 02/17 02/21 2/24 2/28 03/03   Standing flex AAROM cane     20x 30x  25x 25x  25x incline    Standing flex  1#barx30 2#barx20 scap     1#barx25 1# bar 25x     [facility] 11Rx30  9Rx30 9R 20x 9Rx30 nv 9R 20x 9R 25x 9Rx30   Shldr Ext 7Rx20 7Rx30  6Rx30 6R 20x 6Rx30 nv 6R 20x 6R 25x  6Rx30   Supine cane flex 1#x30 2#x30 EHOB     2#x15  3#x15 2#x15  3#x15 2# incline 30x 3#x30   SL ER 3#x30 4#x10  3#x20  1#x30 1#x30 1#x30 2#x15  1#x15 2#x15  1#x15 2# 2x15 3#x20   SL Flex 3#x30 4#x10  3#x20  1#x30 1#x30 1#x30 2#x15  1#x15 2#x15  1#x15 2# 2x15 3#x20   SL Abd  3#x30 4#x10  3#x20  1#x30 1#x30 1#x30 2#x15  1#x15 2#x15  1#x15 2# 2x15 3#x20                Ther Activity 03/07 03/14  02/10 2/14 02/17 02/21 2/24 2/28 03/03   UBE 3'/3' L2 twn pks 3'/3' L2 twn pks  2'/2' 2'/2' 3'/3' 3'/3' 3'/3' 3'/3' 3'/3' L2 twn pks   Pulleys 5'  5'     5'  5'  5'    Wall Slides ff, scap 2'ea table inclined 2'ea table inc  90" ff 45" scap 90" ff, 45" scap 90"ea 90"ea 90' ea contra assist for concentric 2' ea inclined table 2'ea table inclined   Finger ladder ff, scap 10ea 10ea  10ea 10x ea 90"ea 2'ea 2' ea 2' ea                 Pt Edu    SP SP/AL    AL                 Modalities 03/07 03/14  02/10 2/14 02/17 02/21 2/24 2/28 03/03   Ice, prn 15'      10'' post 10' post def 10' post 10' post   MHP                 Vivica Spies, PTA

## 2022-03-17 ENCOUNTER — APPOINTMENT (OUTPATIENT)
Dept: PHYSICAL THERAPY | Facility: CLINIC | Age: 62
End: 2022-03-17
Payer: COMMERCIAL

## 2022-03-21 ENCOUNTER — OFFICE VISIT (OUTPATIENT)
Dept: PHYSICAL THERAPY | Facility: CLINIC | Age: 62
End: 2022-03-21
Payer: COMMERCIAL

## 2022-03-21 DIAGNOSIS — Z96.612 PRESENCE OF LEFT ARTIFICIAL SHOULDER JOINT: ICD-10-CM

## 2022-03-21 DIAGNOSIS — M19.012 PRIMARY OSTEOARTHRITIS OF LEFT SHOULDER: ICD-10-CM

## 2022-03-21 DIAGNOSIS — S46.812D RUPTURE OF SUBSCAPULARIS TENDON, LEFT, SUBSEQUENT ENCOUNTER: Primary | ICD-10-CM

## 2022-03-21 PROCEDURE — 97110 THERAPEUTIC EXERCISES: CPT

## 2022-03-21 PROCEDURE — 97530 THERAPEUTIC ACTIVITIES: CPT

## 2022-03-21 NOTE — PROGRESS NOTES
Daily Note     Today's date: 3/21/2022  Patient name: John Bo  : 1960  MRN: [id number]  Referring provider: Jovani Scruggs PA-C  Dx:   Encounter Diagnosis     ICD-10-CM    1  Rupture of subscapularis tendon, left, subsequent encounter  S46 812D    2  Primary osteoarthritis of left shoulder  M19 012    3  Presence of left artificial shoulder joint  Z96 612        Start Time: 1458  Stop Time: 1547  Total time in clinic (min): 49 minutes  Subjective: Pt denies adverse Sx following gentle progressions of exercise diary LV  Pt states she had a rough weekend, 2/2 loss in the family, but is wanting to continue /c progressing PT as tolerated today  Objective: See treatment diary below    Assessment:  Pt tolerates continued gentle progressions of resistance throughout program well and without c/o pain  Pt demonstrates some (L)UT hiking when elevating (L)UE  Discussed /c pt progressing program NV as tolerated  Pt would benefit from continued PT  Plan: Cont /c PT POC  Progress as tolerated  Precautions: 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 - see protocol; Hx (L) TSR 2021; HTN; syncope; hx DVT; Bipolar disorder; LBP; C/S radiculopathy; chronic pain disorder; fibromyalgia; hx TIA's; migraines; stroke    Date    Visits 21 22 23  15 16 17 18 19 20   FOTO         xx    Re-eval                 Manuals    Re-eval                          Neuro Re-Ed    Body Blade IR/ER  20"x2           Body Blade F/E  20"           Ther Ex    Standing flex  1#barx30 2#barx20 scap 2#barx30    1#barx25 1# bar 25x     Shldr Row 11Rx20 11Rx30 13Rx30  9R 20x 9Rx30 nv 9R 20x 9R 25x 9Rx30   Shldr Ext 7Rx20 7Rx30 8 5Rx30  6R 20x 6Rx30 nv 6R 20x 6R 25x  6Rx30   Supine cane flex 1#x30 2#x30 EHOB 2#x30 EHOB    2#x15  3#x15 2#x15  3#x15 2# incline 30x 3#x30   SL ER 3#x30 4#x10  3#x20 4#x20  3#x10  1#x30 1#x30 2#x15  1#x15 2#x15  1#x15 2# 2x15 3#x20   SL Flex 3#x30 4#x10  3#x20 4#x20  3#x10  1#x30 1#x30 2#x15  1#x15 2#x15  1#x15 2# 2x15 3#x20   SL Abd  3#x30 4#x10  3#x20 4#x20  3#x10  1#x30 1#x30 2#x15  1#x15 2#x15  1#x15 2# 2x15 3#x20                Ther Activity 03/07 03/14 03/21 2/14 02/17 02/21 2/24 2/28 03/03   UBE 3'/3' L2 twn pks 3'/3' L2 twn pks 3'/3' L2 twn pks  2'/2' 3'/3' 3'/3' 3'/3' 3'/3' 3'/3' L2 twn pks   Pulleys 5'  5'     5'  5'  5'    (L) Table Slides ff, scap 2'ea table inclined 2'ea table inc 90"ea          Finger ladder ff, scap 10ea 10ea 90"ea  10x ea 90"ea 2'ea 2' ea 2' ea                 Pt Edu     SP/AL    AL                 Modalities 03/07 03/14 03/21 2/14 02/17 02/21 2/24 2/28 03/03   Ice, prn 15'   15' /p Tx   10'' post 10' post def 10' post 10' post   MHP                 Junious Lever, PTA

## 2022-03-24 ENCOUNTER — OFFICE VISIT (OUTPATIENT)
Dept: PHYSICAL THERAPY | Facility: CLINIC | Age: 62
End: 2022-03-24
Payer: COMMERCIAL

## 2022-03-24 DIAGNOSIS — S46.812D RUPTURE OF SUBSCAPULARIS TENDON, LEFT, SUBSEQUENT ENCOUNTER: Primary | ICD-10-CM

## 2022-03-24 DIAGNOSIS — M19.012 PRIMARY OSTEOARTHRITIS OF LEFT SHOULDER: ICD-10-CM

## 2022-03-24 DIAGNOSIS — Z96.612 PRESENCE OF LEFT ARTIFICIAL SHOULDER JOINT: ICD-10-CM

## 2022-03-24 PROCEDURE — 97110 THERAPEUTIC EXERCISES: CPT

## 2022-03-24 PROCEDURE — 97530 THERAPEUTIC ACTIVITIES: CPT

## 2022-03-24 NOTE — PROGRESS NOTES
Daily Note     Today's date: 3/24/2022  Patient name: Michael Voss  : 1960  MRN: [id number]  Referring provider: Katrina Ceron, SIRISHA  Dx:   Encounter Diagnosis     ICD-10-CM    1  Rupture of subscapularis tendon, left, subsequent encounter  S46 812D    2  Primary osteoarthritis of left shoulder  M19 012    3  Presence of left artificial shoulder joint  Z96 612                   Subjective: Pt states that her shoulder has been doing very well lately, denying adverse reaction following treatment sessions  Objective: See treatment diary below      Assessment: Tolerated treatment well  Encouraged pt to take a rest break between sets of strengthening exercises in sidelying - she was able to complete all three sets with 4# weight instead of having to switch down to the 3# as a result  Post tx session, pt inquired about increasing frequency of strengthening TE's - provided pt education regarding proper frequency/intensity of strengthening (3 times per week) and discussed increasing weight rather than frequency and the importance of rest days to allow for repair of muscle fibers  Pt verbalized understanding  Also discussed POC to d/c in 1-2 weeks as pt is progressing well toward goals thus far  Patient demonstrated fatigue post treatment, exhibited good technique with therapeutic exercises and would benefit from continued PT to progress UE strength to improve pt's tolerance to lifting/carrying objects daily as when grocery shopping and cooking  Plan: Continue per plan of care  Progress treatment as tolerated  Precautions: 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 - see protocol; Hx (L) TSR 2021; HTN; syncope; hx DVT; Bipolar disorder; LBP; C/S radiculopathy; chronic pain disorder; fibromyalgia; hx TIA's; migraines; stroke    Date 03/07 03/14 03/21 3/24  02/17 02/21 2/24 2/28 03/03   Visits 21 22 23 24  16 16 18 19 21   FOTO    xx     xx    Re-eval                 Manuals 03/07 03/14 03/21 3/24  02/17 02/21 2/24 2/28 03/03   Re-eval                          Neuro Re-Ed 03/07 03/14 03/21 3/24  02/17 02/21 2/24 2/28 03/03   Body Blade IR/ER  20"x2  trialed         Body Blade F/E  20"  trialed supine         Ther Ex 03/07 03/14 03/21 3/24  02/17 02/21 2/24 2/28 03/03   Standing flex  1#barx30 2#barx20 scap 2#barx30 2# bar 30x   1#barx25 1# bar 25x     Shldr Row 11Rx20 11Rx30 13Rx30 13R 30x  9Rx30 nv 9R 20x 9R 25x 9Rx30   Shldr Ext 7Rx20 7Rx30 8 5Rx30 8 5R 20x  6Rx30 nv 6R 20x 6R 25x  6Rx30   Supine cane flex 1#x30 2#x30 EHOB 2#x30 EHOB 2# 30x   2#x15  3#x15 2#x15  3#x15 2# incline 30x 3#x30   SL ER 3#x30 4#x10  3#x20 4#x20  3#x10 4# 3x10  1#x30 2#x15  1#x15 2#x15  1#x15 2# 2x15 3#x20   SL Flex 3#x30 4#x10  3#x20 4#x20  3#x10 4# 3x10  1#x30 2#x15  1#x15 2#x15  1#x15 2# 2x15 3#x20   SL Abd  3#x30 4#x10  3#x20 4#x20  3#x10 4# 3x10  1#x30 2#x15  1#x15 2#x15  1#x15 2# 2x15 3#x20                Ther Activity 03/07 03/14 03/21 3/24  02/17 02/21 2/24 2/28 03/03   UBE 3'/3' L2 twn pks 3'/3' L2 twn pks 3'/3' L2 twn pks 3'/3' L2 twin peaks  3'/3' 3'/3' 3'/3' 3'/3' 3'/3' L2 twn pks   Pulleys 5'  5'     5'  5'  5'    (L) Table Slides ff, scap 2'ea table inclined 2'ea table inc 90"ea 90" ea         Finger ladder ff, scap 10ea 10ea 90"ea 90" ea  90"ea 2'ea 2' ea 2' ea                 Pt Edu         AL                 Modalities 03/07 03/14 03/21 3/24  02/17 02/21 2/24 2/28 03/03   Ice, prn 15'   15' /p Tx 15' post tx  10'' post 10' post def 10' post 10' post   MHP

## 2022-03-28 ENCOUNTER — OFFICE VISIT (OUTPATIENT)
Dept: PHYSICAL THERAPY | Facility: CLINIC | Age: 62
End: 2022-03-28
Payer: COMMERCIAL

## 2022-03-28 DIAGNOSIS — S46.812D RUPTURE OF SUBSCAPULARIS TENDON, LEFT, SUBSEQUENT ENCOUNTER: Primary | ICD-10-CM

## 2022-03-28 DIAGNOSIS — M19.012 PRIMARY OSTEOARTHRITIS OF LEFT SHOULDER: ICD-10-CM

## 2022-03-28 DIAGNOSIS — Z96.612 PRESENCE OF LEFT ARTIFICIAL SHOULDER JOINT: ICD-10-CM

## 2022-03-28 PROCEDURE — 97110 THERAPEUTIC EXERCISES: CPT

## 2022-03-28 PROCEDURE — 97530 THERAPEUTIC ACTIVITIES: CPT

## 2022-03-28 NOTE — PROGRESS NOTES
Daily Note     Today's date: 3/28/2022  Patient name: Waleska Scruggs  : 1960  MRN: [id number]  Referring provider: Ghulam Stephens PA-C  Dx:   Encounter Diagnosis     ICD-10-CM    1  Rupture of subscapularis tendon, left, subsequent encounter  S46 812D    2  Primary osteoarthritis of left shoulder  M19 012    3  Presence of left artificial shoulder joint  Z96 612                   Subjective: Pt states that her shoulder was doing well until this weekend during which she "overstretched it" while performing HEP  It is feeling better now though  Objective: See treatment diary below      Assessment: Tolerated treatment well  During (L) shoulder elevation pt continues to exhibit increased UT hiking and active range remains decreased in standing compared to in supine/sidelying d/t effect of gravity  Trialed towel slides on wall however pt with difficulty flexing/controlling the (L) UE while doing so without UT/biceps compensation therefore continued with slides on inclined table with improved form demonstrated  Educated pt today about continuing to improve UE strength to improve forward elevation AROM and stretching within ROM guidelines of protocol/not to exceed them in order to preserve new joint mechanics of reverse TSA  Patient demonstrated fatigue post treatment, exhibited good technique with therapeutic exercises and would benefit from continued PT to progress UE strength/scapular mechanics to improve pt's tolerance to lifting/reaching OH  Plan: Continue per plan of care  Progress treament per protocol  Precautions: 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 - see protocol; Hx (L) TSR 2021; HTN; syncope; hx DVT; Bipolar disorder; LBP; C/S radiculopathy; chronic pain disorder; fibromyalgia; hx TIA's; migraines; stroke    Date 03/07 03/14 03/21 3/24 3/28   2/24 2/28 03/03   Visits 21 22 23 24 25   18 19 20   FOTO    xx     xx    Re-eval                 Manuals 03/07 03/14 03/21 3/24 3/28   2/24 2/28 03/03   Re-eval                          Neuro Re-Ed 03/07 03/14 03/21 3/24 3/28   2/24 2/28 03/03   Body Blade IR/ER  20"x2  trialed         Body Blade F/E  20"  trialed supine         Ther Ex 03/07 03/14 03/21 3/24 3/28   2/24 2/28 03/03   Eccentric Shoulder Flex with contra assist     2x10        Standing flex  1#barx30 2#barx20 scap 2#barx30 2# bar 30x 2# bar 30x   1# bar 25x     Shldr Row 11Rx20 11Rx30 13Rx30 13R 30x 13R 30x   9R 20x 9R 25x 9Rx30   Shldr Ext 7Rx20 7Rx30 8 5Rx30 8 5R 20x 9 1R 30x   6R 20x 6R 25x  6Rx30   Supine cane flex 1#x30 2#x30 EHOB 2#x30 EHOB 2# 30x 3# 30x   2#x15  3#x15 2# incline 30x 3#x30   SL ER 3#x30 4#x10  3#x20 4#x20  3#x10 4# 3x10 4# 3x10   2#x15  1#x15 2# 2x15 3#x20   SL Flex 3#x30 4#x10  3#x20 4#x20  3#x10 4# 3x10 4# 3x10   2#x15  1#x15 2# 2x15 3#x20   SL Abd  3#x30 4#x10  3#x20 4#x20  3#x10 4# 3x10 4# 3x10   2#x15  1#x15 2# 2x15 3#x20                Ther Activity 03/07 03/14 03/21 3/24 3/28   2/24 2/28 03/03   UBE 3'/3' L2 twn pks 3'/3' L2 twn pks 3'/3' L2 twn pks 3'/3' L2 twin peaks 3'/3' L2    3'/3' 3'/3' 3'/3' L2 twn pks   Pulleys 5'  5'      5'  5'    (L) Table Slides ff, scap 2'ea table inclined 2'ea table inc 90"ea 90" ea 90" ea        Finger ladder ff, scap 10ea 10ea 90"ea 90" ea 90" ea    2' ea 2' ea                 Pt [facility]    AL                 Modalities 03/07 03/14 03/21 3/24 3/28   2/24 2/28 03/03   Ice, prn 15'   15' /p Tx 15' post tx 15' post tx    def 10' post 10' post   MHP

## 2022-03-31 ENCOUNTER — OFFICE VISIT (OUTPATIENT)
Dept: PHYSICAL THERAPY | Facility: CLINIC | Age: 62
End: 2022-03-31
Payer: COMMERCIAL

## 2022-03-31 DIAGNOSIS — S46.812D RUPTURE OF SUBSCAPULARIS TENDON, LEFT, SUBSEQUENT ENCOUNTER: Primary | ICD-10-CM

## 2022-03-31 DIAGNOSIS — Z96.612 PRESENCE OF LEFT ARTIFICIAL SHOULDER JOINT: ICD-10-CM

## 2022-03-31 DIAGNOSIS — M19.012 PRIMARY OSTEOARTHRITIS OF LEFT SHOULDER: ICD-10-CM

## 2022-03-31 PROCEDURE — 97110 THERAPEUTIC EXERCISES: CPT

## 2022-03-31 PROCEDURE — 97530 THERAPEUTIC ACTIVITIES: CPT

## 2022-03-31 NOTE — PROGRESS NOTES
Daily Note     Today's date: 3/31/2022  Patient name: Javier Carcamo  : 1960  MRN: [id number]  Referring provider: Yossi Garcias PA-C  Dx:   Encounter Diagnosis     ICD-10-CM    1  Rupture of subscapularis tendon, left, subsequent encounter  S46 812D    2  Primary osteoarthritis of left shoulder  M19 012    3  Presence of left artificial shoulder joint  Z96 612        Start Time: 1500  Stop Time: 1546  Total time in clinic (min): 46 minutes  Subjective: Pt reports " a little" DOMS following LV but notes     Objective: See treatment diary below    Assessment: Tolerated treatment well, including gentle progression of resistance during program   Pt inquired about utilizing her 5# wt at home - instructed to hold for now to monitor response to today's progressions, advised pt to cont /c 4# wts at home  Pt verbalizes awareness of hiking compensations when watching form in the mirror /c cane flexion  Pt demonstrated fatigue post treatment, exhibited good technique with therapeutic exercises and would benefit from continued PT to progress UE strength/scapular mechanics to improve pt's tolerance to lifting/reaching OH  Plan: Cont /c PT POC  Progress as tolerated  Precautions: 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 - see protocol; Hx (L) TSR 2021; HTN; syncope; hx DVT; Bipolar disorder; LBP; C/S radiculopathy; chronic pain disorder; fibromyalgia; hx TIA's; migraines; stroke    Date 03/07 03/14 03/21 3/24 3/28 03/31  2/24 2/28 03/03   Visits 21 25 23 24 25 26  18 19 20   FOTO    xx     xx    Re-eval                 Manuals 03/07 03/14 03/21 3/24 3/28 03/31  2/24 2/28 03/03   Re-eval                          Neuro Re-Ed 03/07 03/14 03/21 3/24 3/28 03/31  2/24 2/28 03/03   Body Blade IR/ER  20"x2  trialed         Body Blade F/E  20"  trialed supine         Ther Ex 03/07 03/14 03/21 3/24 3/28 03/31  2/24 2/28 03/03   Eccentric Shoulder Flex with contra assist     2x10        Standing flex  1#barx30 2#barx20 scap 2#barx30 2# bar 30x 2# bar 30x 2#cane 30x  1# bar 25x     Shldr Row 11Rx20 11Rx30 13Rx30 13R 30x 13R 30x 13Rx30  9R 20x 9R 25x 9Rx30   Shldr Ext 7Rx20 7Rx30 8 5Rx30 8 5R 20x 9 1R 30x 9Rx30  6R 20x 6R 25x  6Rx30   Supine cane flex 1#x30 2#x30 EHOB 2#x30 EHOB 2# 30x 3# 30x 3#x30 EHOB  2#x15  3#x15 2# incline 30x 3#x30   SL ER 3#x30 4#x10  3#x20 4#x20  3#x10 4# 3x10 4# 3x10 5#x10  4#x10  2#x15  1#x15 2# 2x15 3#x20   SL Flex 3#x30 4#x10  3#x20 4#x20  3#x10 4# 3x10 4# 3x10 5#x10  4#x10  2#x15  1#x15 2# 2x15 3#x20   SL Abd  3#x30 4#x10  3#x20 4#x20  3#x10 4# 3x10 4# 3x10 5#x10  4#x10  2#x15  1#x15 2# 2x15 3#x20                Ther Activity 03/07 03/14 03/21 3/24 3/28 03/31  2/24 2/28 03/03   UBE 3'/3' L2 twn pks 3'/3' L2 twn pks 3'/3' L2 twn pks 3'/3' L2 twin peaks 3'/3' L2  3'/3' L1 twn pks  3'/3' 3'/3' 3'/3' L2 twn pks   Pulleys 5'  5'      5'  5'    (L) Table Slides ff, scap 2'ea table inclined 2'ea table inc 90"ea 90" ea 90" ea 90"ea       Finger ladder ff, scap 10ea 10ea 90"ea 90" ea 90" ea  90"ea  2' ea 2' ea                 Pt Edu     AL    AL                 Modalities 03/07 03/14 03/21 3/24 3/28 03/31  2/24 2/28 03/03   Ice, prn 15'   15' /p Tx 15' post tx 15' post tx  10' /p Tx  def 10' post 10' post   MHP                 Tiera Carpenter, PTA

## 2022-04-04 ENCOUNTER — OFFICE VISIT (OUTPATIENT)
Dept: PHYSICAL THERAPY | Facility: CLINIC | Age: 62
End: 2022-04-04
Payer: COMMERCIAL

## 2022-04-04 DIAGNOSIS — M19.012 PRIMARY OSTEOARTHRITIS OF LEFT SHOULDER: ICD-10-CM

## 2022-04-04 DIAGNOSIS — S46.812D RUPTURE OF SUBSCAPULARIS TENDON, LEFT, SUBSEQUENT ENCOUNTER: Primary | ICD-10-CM

## 2022-04-04 DIAGNOSIS — Z96.612 PRESENCE OF LEFT ARTIFICIAL SHOULDER JOINT: ICD-10-CM

## 2022-04-04 PROCEDURE — 97530 THERAPEUTIC ACTIVITIES: CPT

## 2022-04-04 PROCEDURE — 97110 THERAPEUTIC EXERCISES: CPT

## 2022-04-04 NOTE — PROGRESS NOTES
Daily Note     Today's date: 2022  Patient name: Pearl Harada  : 1960  MRN: [id number]  Referring provider: Pamela River PA-C  Dx:   Encounter Diagnosis     ICD-10-CM    1  Rupture of subscapularis tendon, left, subsequent encounter  S46 812D    2  Primary osteoarthritis of left shoulder  M19 012    3  Presence of left artificial shoulder joint  Z96 612                   Subjective: Pt presents to PT with no significant changes since last visit  Pt reports mild soreness in L shoulder joint aspect grading the soreness a 2-310  Objective: See treatment diary below      Assessment: Tolerated treatment well  Pt requires VC throughout tx session for prompting of exercises and occasionally for form  She continues to display limited forward flex in standing compared to in supine with head of bed elevated when effects of gravity are reduced  Discussed POC to d/c at end of this week or next  Pt requested to continue x1 more week with goal to then d/c to HEP  Patient demonstrated fatigue post treatment, exhibited good technique with therapeutic exercises and would benefit from continued PT to progress UE strength to improve pt's tolerance to reaching/lifting OH daily  Plan: Continue per plan of care  Progress treament per protocol  Precautions: 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 - see protocol; Hx (L) TSR 2021; HTN; syncope; hx DVT; Bipolar disorder; LBP; C/S radiculopathy; chronic pain disorder; fibromyalgia; hx TIA's; migraines; stroke    Date 03/07 03/14 03/21 3/24 3/28 03/31 4/4      Visits  22 23 24 25 26 27      FOTO    xx         Re-eval                 Manuals 03/07 03/14 03/21 3/24 3/28 03/31 44      Re-eval                          Neuro Re-Ed 03/07 03/14 03/21 3/24 3/28 03/31 4/4      Body Blade IR/ER  20"x2  trialed         Body Blade F/E  20"  trialed supine         Ther Ex 03/07 03/14 03/21 3/24 3/28 03/31 44      Eccentric Shoulder Flex with contra assist     2x10 Standing flex  1#barx30 2#barx20 scap 2#barx30 2# bar 30x 2# bar 30x 2#cane 30x 3# cane 30x      Shldr Row 11Rx20 11Rx30 13Rx30 13R 30x 13R 30x 13Rx30       Shldr Ext 7Rx20 7Rx30 8 5Rx30 8 5R 20x 9 1R 30x 9Rx30 10R x 30      Supine cane flex 1#x30 2#x30 EHOB 2#x30 EHOB 2# 30x 3# 30x 3#x30 EHOB 3# 30x EHOB      SL ER 3#x30 4#x10  3#x20 4#x20  3#x10 4# 3x10 4# 3x10 5#x10  4#x10 5# x 20  4# x 10      SL Flex 3#x30 4#x10  3#x20 4#x20  3#x10 4# 3x10 4# 3x10 5#x10  4#x10 5# x 20  4# x 10      SL Abd  3#x30 4#x10  3#x20 4#x20  3#x10 4# 3x10 4# 3x10 5#x10  4#x10 5# x 20  4# x 10                   Ther Activity 03/07 03/14 03/21 3/24 3/28 03/31 4/4      UBE 3'/3' L2 twn pks 3'/3' L2 twn pks 3'/3' L2 twn pks 3'/3' L2 twin peaks 3'/3' L2  3'/3' L1 twn pks 3'/3' L1 twn pks      Pulleys 5'  5'           (L) Table Slides ff, scap 2'ea table inclined 2'ea table inc 90"ea 90" ea 90" ea 90"ea 90"ea      Finger ladder ff, scap 10ea 10ea 90"ea 90" ea 90" ea  90"ea 90"ea                   Pt Edu     AL  AL                   Modalities 03/07 03/14 03/21 3/24 3/28 03/31 4/4      Ice, prn 15'   15' /p Tx 15' post tx 15' post tx  10' /p Tx 10' post tx      [facility]

## 2022-04-07 ENCOUNTER — OFFICE VISIT (OUTPATIENT)
Dept: PHYSICAL THERAPY | Facility: CLINIC | Age: 62
End: 2022-04-07
Payer: COMMERCIAL

## 2022-04-07 DIAGNOSIS — S46.812D RUPTURE OF SUBSCAPULARIS TENDON, LEFT, SUBSEQUENT ENCOUNTER: Primary | ICD-10-CM

## 2022-04-07 DIAGNOSIS — M19.012 PRIMARY OSTEOARTHRITIS OF LEFT SHOULDER: ICD-10-CM

## 2022-04-07 DIAGNOSIS — Z96.612 PRESENCE OF LEFT ARTIFICIAL SHOULDER JOINT: ICD-10-CM

## 2022-04-07 PROCEDURE — 97530 THERAPEUTIC ACTIVITIES: CPT

## 2022-04-07 PROCEDURE — 97110 THERAPEUTIC EXERCISES: CPT

## 2022-04-07 NOTE — PROGRESS NOTES
Daily Note     Today's date: 2022  Patient name: Pelon Browning  : 1960  MRN: [id number]  Referring provider: Alejandrina Falcon PA-C  Dx:   Encounter Diagnosis     ICD-10-CM    1  Rupture of subscapularis tendon, left, subsequent encounter  S46 812D    2  Primary osteoarthritis of left shoulder  M19 012    3  Presence of left artificial shoulder joint  Z96 612        Start Time: 1505  Stop Time: 1548  Total time in clinic (min): 43 minutes  Subjective: Pt arrives to Tx denying adverse Sx following LV, noting "I have good days and bad days; today is a good day "     Objective: See treatment diary below    Assessment: Tolerated treatment well - including additions to exercise diary without c/o pain  Pt demonstrated fatigue post treatment, exhibited good technique with therapeutic exercises and would benefit from continued PT to progress UE strength to improve pt's tolerance to reaching/lifting OH daily  Plan: Cont /c PT POC  Progress as tolerated  Precautions: 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 - see protocol; Hx (L) TSR 2021; HTN; syncope; hx DVT; Bipolar disorder; LBP; C/S radiculopathy; chronic pain disorder; fibromyalgia; hx TIA's; migraines; stroke    Date 03/07 03/14 03/21 3/24 3/28 03/31 4/4 04/07     Visits  23 24 25 26 27 28     FOTO    xx         Re-eval                 Manuals 03/07 03/14 03/21 3/24 3/28 03/31 4/4 04/07     Re-eval                          Neuro Re-Ed 03/07 03/14 03/21 3/24 3/28 03/31 4/4 04/07     Body Blade IR/ER  20"x2  trialed         Body Blade F/E  20"  trialed supine         Ther Ex 03/07 03/14 03/21 3/24 3/28 03/31 4/4 04/07     Eccentric Shoulder Flex with contra assist     2x10        Standing flex  1#barx30 2#barx20 scap 2#barx30 2# bar 30x 2# bar 30x 2#cane 30x 3# cane 30x 3#barx30     Shldr Row 11Rx20 11Rx30 13Rx30 13R 30x 13R 30x 13Rx30  14Rx20     Shldr Ext 7Rx20 7Rx30 8 5Rx30 8 5R 20x 9 1R 30x 9Rx30 10R x 30 9Rx20     [place] IR (R)        1Rx20 [place] ER (R)        1Rx20     Tricep Ext        9Rx20     Supine cane flex 1#x30 2#x30 EHOB 2#x30 EHOB 2# 30x 3# 30x 3#x30 EHOB 3# 30x EHOB 3# 30x EHOB     SL ER 3#x30 4#x10  3#x20 4#x20  3#x10 4# 3x10 4# 3x10 5#x10  4#x10 5# x 20  4# x 10 5#x20     SL Flex 3#x30 4#x10  3#x20 4#x20  3#x10 4# 3x10 4# 3x10 5#x10  4#x10 5# x 20  4# x 10 5#x20     SL Abd  3#x30 4#x10  3#x20 4#x20  3#x10 4# 3x10 4# 3x10 5#x10  4#x10 5# x 20  4# x 10 5#x20                  Ther Activity 03/07 03/14 03/21 3/24 3/28 03/31 4/4 04/07     UBE 3'/3' L2 twn pks 3'/3' L2 twn pks 3'/3' L2 twn pks 3'/3' L2 twin peaks 3'/3' L2  3'/3' L1 twn pks 3'/3' L1 twn pks 3'/3' L2 twn pks     Pulleys 5'  5'           (L) Table Slides ff, scap 2'ea table inclined 2'ea table inc 90"ea 90" ea 90" ea 90"ea 90"ea      Finger ladder ff, scap 10ea 10ea 90"ea 90" ea 90" ea  90"ea 90"ea 90"ea                  Pt Edu     AL  AL                   Modalities 03/07 03/14 03/21 3/24 3/28 03/31 4/4 04/07     Ice, prn 15'   15' /p Tx 15' post tx 15' post tx  10' /p Tx 10' post tx 10' /p Tx     MHP                 Julián , PTA

## 2022-04-11 ENCOUNTER — OFFICE VISIT (OUTPATIENT)
Dept: PHYSICAL THERAPY | Facility: CLINIC | Age: 62
End: 2022-04-11
Payer: COMMERCIAL

## 2022-04-11 DIAGNOSIS — M19.012 PRIMARY OSTEOARTHRITIS OF LEFT SHOULDER: ICD-10-CM

## 2022-04-11 DIAGNOSIS — Z96.612 PRESENCE OF LEFT ARTIFICIAL SHOULDER JOINT: ICD-10-CM

## 2022-04-11 DIAGNOSIS — S46.812D RUPTURE OF SUBSCAPULARIS TENDON, LEFT, SUBSEQUENT ENCOUNTER: Primary | ICD-10-CM

## 2022-04-11 PROCEDURE — 97530 THERAPEUTIC ACTIVITIES: CPT

## 2022-04-11 PROCEDURE — 97110 THERAPEUTIC EXERCISES: CPT

## 2022-04-11 NOTE — PROGRESS NOTES
Daily Note     Today's date: 2022  Patient name: Rogelio Haji  : 1960  MRN: [id number]  Referring provider: Josefina Gomes PA-C  Dx:   Encounter Diagnosis     ICD-10-CM    1  Rupture of subscapularis tendon, left, subsequent encounter  S46 812D    2  Primary osteoarthritis of left shoulder  M19 012    3  Presence of left artificial shoulder joint  Z96 612          Subjective: Patient noted " I had company coming over so I cleaned my house and the next day everything hurt except my L arm and I was using it "       Objective: See treatment diary below      Assessment: Tolerated treatment fair  Patient needed VC to correct form  for triceps pull downs IR ER on [place] today  Patient would benefit from continued PT      Plan: Continue per plan of care  Precautions: 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 - see protocol; Hx (L) TSR 2021; HTN; syncope; hx DVT; Bipolar disorder; LBP; C/S radiculopathy; chronic pain disorder; fibromyalgia; hx TIA's; migraines; stroke    Date 03/07 03/14 03/21 3/24 3/28 03/31 4/4 04/07 4/11    Visits 21 22 23 24 25 26 27 28 29    FOTO    xx         Re-eval                 Manuals 03/07 03/14 03/21 3/24 3/28 03/31 4/4 04/07 4/11    Re-eval                          Neuro Re-Ed 03/07 03/14 03/21 3/24 3/28 03/31 4/4 04/07 4/11    Body [place] IR/ER  20"x2  trialed         Body [place]ade F/E  20"  trialed supine         Ther Ex 03/07 03/14 03/21 3/24 3/28 03/31 4/4 04/07 4/11    Eccentric Shoulder Flex with contra assist     2x10        Standing flex  1#barx30 2#barx20 scap 2#barx30 2# bar 30x 2# bar 30x 2#cane 30x 3# cane 30x 3#barx30 3#barx30    Shldr Row 11Rx20 11Rx30 13Rx30 13R 30x 13R 30x 13Rx30  14Rx20 14R x 20    Shldr Ext 7Rx20 7Rx30 8 5Rx30 8 5R 20x 9 1R 30x 9Rx30 10R x 30 9Rx20 9R x 20    [place] IR (R)        1Rx20 1R x 20    [place] ER (R)        1Rx20 1R x 20    Tricep Ext        9Rx20 9R x 20    Supine cane flex 1#x30 2#x30 EHOB 2#x30 EHOB 2# 30x 3# 30x 3#x30 EHOB 3# 30x EHOB 3# 30x EHOB 3# 30x EHOB    SL ER 3#x30 4#x10  3#x20 4#x20  3#x10 4# 3x10 4# 3x10 5#x10  4#x10 5# x 20  4# x 10 5#x20 5# x 20    SL Flex 3#x30 4#x10  3#x20 4#x20  3#x10 4# 3x10 4# 3x10 5#x10  4#x10 5# x 20  4# x 10 5#x20 5# x 20    SL Abd  3#x30 4#x10  3#x20 4#x20  3#x10 4# 3x10 4# 3x10 5#x10  4#x10 5# x 20  4# x 10 5#x20 5# x 20                 Ther Activity 03/07 03/14 03/21 3/24 3/28 03/31 4/4 04/07 4/11    UBE 3'/3' L2 twn pks 3'/3' L2 twn pks 3'/3' L2 twn pks 3'/3' L2 twin peaks 3'/3' L2  3'/3' L1 twn pks 3'/3' L1 twn pks 3'/3' L2 twn pks 3'3' L2 twn pks     Pulleys 5'  5'           (L) Table Slides ff, scap 2'ea table inclined 2'ea table inc 90"ea 90" ea 90" ea 90"ea 90"ea  90'' ea    Finger ladder ff, scap 10ea 10ea 90"ea 90" ea 90" ea  90"ea 90"ea 90"ea 90" x ea                 Pt Edu     AL  AL                   Modalities 03/07 03/14 03/21 3/24 3/28 03/31 4/4 04/07 4/11    Ice, prn 15'   15' /p Tx 15' post tx 13' post tx  10' /p Tx 10' post tx 10' /p Tx 10' p/Tx    MHP

## 2022-04-14 ENCOUNTER — OFFICE VISIT (OUTPATIENT)
Dept: PHYSICAL THERAPY | Facility: CLINIC | Age: 62
End: 2022-04-14
Payer: COMMERCIAL

## 2022-04-14 DIAGNOSIS — Z96.612 PRESENCE OF LEFT ARTIFICIAL SHOULDER JOINT: ICD-10-CM

## 2022-04-14 DIAGNOSIS — S46.812D RUPTURE OF SUBSCAPULARIS TENDON, LEFT, SUBSEQUENT ENCOUNTER: Primary | ICD-10-CM

## 2022-04-14 DIAGNOSIS — M19.012 PRIMARY OSTEOARTHRITIS OF LEFT SHOULDER: ICD-10-CM

## 2022-04-14 PROCEDURE — 97530 THERAPEUTIC ACTIVITIES: CPT

## 2022-04-14 PROCEDURE — 97110 THERAPEUTIC EXERCISES: CPT

## 2022-04-14 NOTE — PROGRESS NOTES
Daily Note     Today's date: 2022  Patient name: Oscar Blood  : 1960  MRN: [id number]  Referring provider: Danika Castaneda PA-C  Dx:   Encounter Diagnosis     ICD-10-CM    1  Rupture of subscapularis tendon, left, subsequent encounter  S46 812D    2  Primary osteoarthritis of left shoulder  M19 012    3  Presence of left artificial shoulder joint  Z96 612          Subjective: Pt arrives to Tx prepared to DC to HEP - would like an updated HEP for at home  Objective: See treatment diary below    Assessment: Provided pt /c updated HEP and blue TB for at home  Pt demonstrates good recall of exercises from program and would benefit from HEP compliance to maintain + build upon progress achieved thus far in OPPT  Plan: DC to HEP  Precautions: 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 - see protocol; Hx (L) TSR 2021; HTN; syncope; hx DVT; Bipolar disorder; LBP; C/S radiculopathy; chronic pain disorder; fibromyalgia; hx TIA's; migraines; stroke    Date 03/07 03/14 03/21 3/24 3/28 03/31 4/4 04/07 4/11 04/14   Visits 21 22 23 24 25 26 27 28 29 30   FOTO    xx         Re-eval                 Manuals 03/07 03/14 03/21 3/24 3/28 03/31 4/4 04/07 4/11 04/14   Re-eval                          Neuro Re-Ed 03/07 03/14 03/21 3/24 3/28 03/31 4/4 04/07 4/11 04/14   Body Blade IR/ER  20"x2  trialed         Body [place] F/E  20"  trialed supine         Ther Ex 03/07 03/14 03/21 3/24 3/28 03/31 4/4 04/07 4/11 04/14   Eccentric Shoulder Flex with contra assist     2x10        Standing flex  1#barx30 2#barx20 scap 2#barx30 2# bar 30x 2# bar 30x 2#cane 30x 3# cane 30x 3#barx30 3#barx30    Shldr Row 11Rx20 11Rx30 13Rx30 13R 30x 13R 30x 13Rx30  14Rx20 14R x 20 14Rx30   Shldr Ext 7Rx20 7Rx30 8 5Rx30 8 5R 20x 9 1R 30x 9Rx30 10R x 30 9Rx20 9R x 20 9Rx30   [place] IR (R)        1Rx20 1R x 20 1R x30   [place] ER (R)        1Rx20 1R x 20 1Rx30   Tricep Ext        9Rx20 9R x 20 9Rx30   Supine cane flex 1#x30 2#x30 EHOB 2#x30 EHOB 2# 30x 3# 30x 3#x30 EHOB 3# 30x EHOB 3# 30x EHOB 3# 30x EHOB 3# 30x EHOB   SL ER 3#x30 4#x10  3#x20 4#x20  3#x10 4# 3x10 4# 3x10 5#x10  4#x10 5# x 20  4# x 10 5#x20 5# x 20 5#x10  4#x10   SL Flex 3#x30 4#x10  3#x20 4#x20  3#x10 4# 3x10 4# 3x10 5#x10  4#x10 5# x 20  4# x 10 5#x20 5# x 20 5#x10  4#x10   SL Abd  3#x30 4#x10  3#x20 4#x20  3#x10 4# 3x10 4# 3x10 5#x10  4#x10 5# x 20  4# x 10 5#x20 5# x 20 5#x10  4#x10                Ther Activity 03/07 03/14 03/21 3/24 3/28 03/31 4/4 04/07 4/11 04/14   UBE 3'/3' L2 twn pks 3'/3' L2 twn pks 3'/3' L2 twn pks 3'/3' L2 twin peaks 3'/3' L2  3'/3' L1 twn pks 3'/3' L1 twn pks 3'/3' L2 twn pks 3'3' L2 twn pks  3'/3' L1  twn pks   Pulleys 5'  5'           (L) Table Slides ff, scap 2'ea table inclined 2'ea table inc 90"ea 90" ea 90" ea 90"ea 90"ea  90'' ea 90"ff wall   Finger ladder ff, scap 10ea 10ea 90"ea 90" ea 90" ea  90"ea 90"ea 90"ea 90" x ea 90"ea                Pt Edu     AL  AL   SP HEP                Modalities 03/07 03/14 03/21 3/24 3/28 03/31 4/4 04/07 4/11 04/14   Ice, prn 15'   15' /p Tx 15' post tx 13' post tx  10' /p Tx 10' post tx 10' /p Tx 10' p/Tx /c pt educ   MHP                 Lucy Baer, JOSEPH

## 2022-04-18 ENCOUNTER — APPOINTMENT (OUTPATIENT)
Dept: PHYSICAL THERAPY | Facility: CLINIC | Age: 62
End: 2022-04-18
Payer: COMMERCIAL

## 2022-04-21 ENCOUNTER — APPOINTMENT (OUTPATIENT)
Dept: PHYSICAL THERAPY | Facility: CLINIC | Age: 62
End: 2022-04-21
Payer: COMMERCIAL

## 2022-04-25 ENCOUNTER — APPOINTMENT (OUTPATIENT)
Dept: PHYSICAL THERAPY | Facility: CLINIC | Age: 62
End: 2022-04-25
Payer: COMMERCIAL

## 2022-04-28 ENCOUNTER — APPOINTMENT (OUTPATIENT)
Dept: RADIOLOGY | Facility: OTHER | Age: 62
End: 2022-04-28
Payer: COMMERCIAL

## 2022-04-28 ENCOUNTER — OFFICE VISIT (OUTPATIENT)
Dept: OBGYN CLINIC | Facility: OTHER | Age: 62
End: 2022-04-28
Payer: COMMERCIAL

## 2022-04-28 VITALS
HEIGHT: 68 IN | BODY MASS INDEX: 37.89 KG/M2 | SYSTOLIC BLOOD PRESSURE: 144 MMHG | WEIGHT: 250 LBS | DIASTOLIC BLOOD PRESSURE: 80 MMHG | HEART RATE: 89 BPM

## 2022-04-28 DIAGNOSIS — M25.511 ACUTE PAIN OF RIGHT SHOULDER: ICD-10-CM

## 2022-04-28 DIAGNOSIS — M19.011 PRIMARY OSTEOARTHRITIS, RIGHT SHOULDER: Primary | ICD-10-CM

## 2022-04-28 DIAGNOSIS — Z96.612 STATUS POST TOTAL REPLACEMENT OF LEFT SHOULDER: ICD-10-CM

## 2022-04-28 PROCEDURE — 99214 OFFICE O/P EST MOD 30 MIN: CPT | Performed by: PHYSICIAN ASSISTANT

## 2022-04-28 PROCEDURE — 73030 X-RAY EXAM OF SHOULDER: CPT

## 2022-04-28 PROCEDURE — 20610 DRAIN/INJ JOINT/BURSA W/O US: CPT | Performed by: PHYSICIAN ASSISTANT

## 2022-04-28 RX ORDER — BETAMETHASONE SODIUM PHOSPHATE AND BETAMETHASONE ACETATE 3; 3 MG/ML; MG/ML
6 INJECTION, SUSPENSION INTRA-ARTICULAR; INTRALESIONAL; INTRAMUSCULAR; SOFT TISSUE
Status: COMPLETED | OUTPATIENT
Start: 2022-04-28 | End: 2022-04-28

## 2022-04-28 RX ORDER — BUPIVACAINE HYDROCHLORIDE 2.5 MG/ML
2 INJECTION, SOLUTION INFILTRATION; PERINEURAL
Status: COMPLETED | OUTPATIENT
Start: 2022-04-28 | End: 2022-04-28

## 2022-04-28 RX ORDER — LIDOCAINE HYDROCHLORIDE 10 MG/ML
1 INJECTION, SOLUTION INFILTRATION; PERINEURAL
Status: COMPLETED | OUTPATIENT
Start: 2022-04-28 | End: 2022-04-28

## 2022-04-28 RX ADMIN — LIDOCAINE HYDROCHLORIDE 1 ML: 10 INJECTION, SOLUTION INFILTRATION; PERINEURAL at 15:28

## 2022-04-28 RX ADMIN — BETAMETHASONE SODIUM PHOSPHATE AND BETAMETHASONE ACETATE 6 MG: 3; 3 INJECTION, SUSPENSION INTRA-ARTICULAR; INTRALESIONAL; INTRAMUSCULAR; SOFT TISSUE at 15:28

## 2022-04-28 RX ADMIN — BUPIVACAINE HYDROCHLORIDE 2 ML: 2.5 INJECTION, SOLUTION INFILTRATION; PERINEURAL at 15:28

## 2022-04-28 NOTE — PATIENT INSTRUCTIONS

## 2022-04-28 NOTE — PROGRESS NOTES
Assessment  Diagnoses and all orders for this visit:    Primary osteoarthritis, right shoulder  -     XR shoulder 2+ vw right; Future    Status post total replacement of left shoulder        Discussion and Plan: For the left shoulder, Coby Cordova can discontinue with her strengthening exercises  She is progressing well and I do not want her to have any setbacks by stressing the acromion  She can resume activities to tolerance and shoulder will continue to strengthen with routine use  For the right shoulder, Coby Cordova has glenohumeral osteoarthritis  Steroid injection was offered today for pain relief  She is aware these injections can be repeated in 4 months  If pain returns sooner than that, she will contact our office for a reevaluation  Activities to tolerance for the right shoulder  Performing activities that cause pain will exacerbate her symptoms    Subjective:   Patient ID: Lori Benites is a 59 y o  female      Coby Cordova presents to the office in follow up of her left shoulder  She is 4 months from revision of left total shoulder to reverse total shoulder  She recently transitioned from formal PT to an independent HEP  She is very pleased with her progress with the left shoulder  She is able to use the arm for ADLs, get items out of cabinets and perform activities of enjoyment  She denies pain in the left shoulder at night  No injury or trauma  Coby Cordova has a new complaint of right shoulder pain  Pain has been present for years  States her right shoulder pain reminds her of the left shoulder prior to her initial surgery  Pain is constant  Pain is made worse with any motion  Pain incompletely resolves with rest   Pain interferes with sleep  Admits to crepitation with motion    Admits to prior injection with good relief      The following portions of the patient's history were reviewed and updated as appropriate: allergies, current medications, past family history, past medical history, past social history, past surgical history and problem list     Review of Systems   Constitutional: Negative for chills and fever  HENT: Negative for hearing loss  Eyes: Negative for visual disturbance  Respiratory: Negative for shortness of breath  Cardiovascular: Negative for chest pain  Gastrointestinal: Negative for abdominal pain  Musculoskeletal:        As reviewed in the HPI   Skin: Negative for rash  Neurological:        As reviewed in the HPI   Psychiatric/Behavioral: Negative for agitation  Objective:  /80   Pulse 89   Ht 5' 8" (1 727 m)   Wt 113 kg (250 lb)   BMI 38 01 kg/m²       Right Shoulder Exam     Tenderness   The patient is experiencing no tenderness  Range of Motion   Passive abduction: normal Right shoulder passive abduction: painful  External rotation: normal Right shoulder external rotation: painful  Forward flexion: normal Right shoulder forward flexion: painful  Muscle Strength   External rotation: 5/5   Supraspinatus: 5/5     Tests   Rosa test: positive  Cross arm: positive  Impingement: positive  Drop arm: negative    Other   Erythema: absent  Scars: absent  Sensation: normal  Pulse: present      Left Shoulder Exam     Tenderness   The patient is experiencing no tenderness  Range of Motion   Passive abduction: 90   External rotation: 40   Forward flexion: 150     Tests   Apprehension: negative    Other   Erythema: absent  Scars: present (healed deltopectoral incision)  Sensation: normal  Pulse: present               Physical Exam  Constitutional:       Appearance: She is well-developed  HENT:      Head: Normocephalic  Pulmonary:      Breath sounds: Normal breath sounds  No wheezing  Musculoskeletal:      Cervical back: Normal range of motion  Skin:     General: Skin is warm and dry  Neurological:      Mental Status: She is alert and oriented to person, place, and time     Psychiatric:         Behavior: Behavior normal          Thought Content: Thought content normal          Judgment: Judgment normal        Radiology results  Xrays of the right shoulder were obtained in the office today  My interpretation follows:    · 2 views right shoulder - moderate glenohumeral osteoarthritis with inferior humeral head osteophyte, no proximal migration of humeral head, no fx or dislocation    Large joint arthrocentesis: R glenohumeral  [place] Protocol:  Consent: Verbal consent obtained    Consent given by: patient    Supporting Documentation  Indications: pain   Procedure Details  Location: shoulder - R glenohumeral  Preparation: Patient was prepped and draped in the usual sterile fashion  Needle size: 22 G  Ultrasound guidance: no  Approach: posterior  Medications administered: 6 mg betamethasone acetate-betamethasone sodium phosphate 6 (3-3) mg/mL; 2 mL bupivacaine 0 25 %; 1 mL lidocaine 1 %    Patient tolerance: patient tolerated the procedure well with no immediate complications  Dressing:  Sterile dressing applied

## 2022-05-09 ENCOUNTER — DOCUMENTATION (OUTPATIENT)
Dept: CASE MANAGEMENT | Facility: OTHER | Age: 62
End: 2022-05-09

## 2022-05-09 NOTE — MEDICAL HIGH UTILIZER
Ricky 53 for: José Miguel Londono  Patient Name: José Miguel Londono          MRN: [id number]       : 1960 Age: 59      Sex:  F   Utilization Background: She is a 59year old female with a history of migraine, Andréss disease, Bipolar, Depression, Fibromyalgia, and HTN who presents to [facility] with migraine  She has 14 ER visits, 9 admission, and 2 transfers to [facility] for Ketamine Infusions in 2019  Discussed with Neurology reveals that patient does not feel much better even after prolonged hospitalization  In the last 90 days: 1 planned ortho surgery admission   Treatment Recommendations:  ED Recommendations: Recommendations as per neurology: Give migraine protocol: using steroid protocol d/t toradol allergy  Recommend calling her [facility] Neurologist to schedule close outpatient follow up  It is noted that the patient will provide other complaints to the ER providers to get admitted and then will complain of migraine in the hospital    Would not offer transfer for Ketamine Infusion to this patient as it has not worked in the past  This should be left up to Neurology to determine if this is appropriate  Inpatient Recommendations: Early consultation with neurology  Avoid narcotics/sedating agents due to over sedation in the past       Outpatient recommendations: Needs close follow up with [facility] Neurology  Needs CM to make sure that patient does not run out of her meds as she has in the past     Situation: Patient is a patient who presents frequently for migraines  It seems that she has started using other chief complaints to get admitted to the hospital for migraine treatment   As per neurology colleague her headache symptomatology does not usually change with treatment      PMH/PSH:  Migraine, Depression, Bipolar, Fibromyalgia, HTN, Hx of DVT      Assessment                              Drivers of repeated utilization:                 Symptomatology Community Resources in place:    None - she has mentioned that she does not have a  for infusions  May need assistance with transportation and with medications   Patient Care Team:   Helen Aviles DO - PCP [facility]  Lia Lincoln MD - Neurology [facility]  OP Care Manager: Irina Garibay RN              Care plan date and owner: Patsy Siddiqui PA-C 10/31/2019         Reviewed with patient before discharge?

## 2022-05-19 ENCOUNTER — DOCUMENTATION (OUTPATIENT)
Dept: CASE MANAGEMENT | Facility: OTHER | Age: 62
End: 2022-05-19

## 2022-05-19 NOTE — MEDICAL HIGH UTILIZER
Ricky 53 for: Meri Wells  Patient Name: Laisha Wallace          MRN: [id number]       : 1960 Age: 59      Sex:  F   Utilization Background: She is a female with a history of migraine, Hartleys disease, Bipolar, Depression, Fibromyalgia, and HTN who presents to [facility] and [facility] with migraine  She has 14 ER visits, 9 admission, and 2 transfers to [facility] for Ketamine Infusions in 2019  Discussed with Neurology reveals that patient does not feel much better even after prolonged hospitalization  In the last 90 days: 0 encounters    Treatment Recommendations:  ED Recommendations: Recommendations as per neurology: Give migraine protocol: using steroid protocol d/t toradol allergy  Recommend calling her [facility] Neurologist to schedule close outpatient follow up  It is noted that the patient will provide other complaints to the ER providers to get admitted and then will complain of migraine in the hospital    Would not offer transfer for Ketamine Infusion to this patient as it has not worked in the past  This should be left up to Neurology to determine if this is appropriate  Inpatient Recommendations: Early consultation with neurology  Avoid narcotics/sedating agents due to over sedation in the past       Outpatient recommendations: Needs close follow up with [facility] Neurology  Needs CM to make sure that patient does not run out of her meds as she has in the past     Situation: Patient who presents frequently for migraines  It seems that she has started using other chief complaints to get admitted to the hospital for migraine treatment   As per neurology colleague her headache symptomatology does not usually change with treatment      PMH/PSH:  Migraine, Depression, Bipolar, Fibromyalgia, HTN, Hx of DVT      Assessment                              Drivers of repeated utilization:                 Symptomatology     Community Resources in place:    None - she has mentioned that she does not have a  for infusions  May need assistance with transportation and with medications   Patient Care Team:   Tata Carrasquillo MD - PCP [facility])  Hipolito Collazo, [facility] MOON Stevens/Luigi Quinteros MD - Neurology [facility])  OP Care Manager: Saima Gallardo RN              Care plan date and owner: Sadaf Hernandez PA-C 10/31/2019         Reviewed with patient before discharge?

## 2022-08-16 ENCOUNTER — DOCUMENTATION (OUTPATIENT)
Dept: CASE MANAGEMENT | Facility: OTHER | Age: 62
End: 2022-08-16

## 2022-08-16 NOTE — MEDICAL HIGH UTILIZER
Ricky 53 for: Riya Moctezuma  Patient Name: Riya Moctezuma          MRN: [id number]       : 1960 Age: 59      Sex:  F   Utilization Background: She is a female with a history of migraine, [place]s disease, Bipolar, Depression, Fibromyalgia, and HTN who presents to [facility] and [facility] with migraine  She has 14 ER visits, 9 admission, and 2 transfers to [facility] for Ketamine Infusions in 2019  Discussed with Neurology reveals that patient does not feel much better even after prolonged hospitalization  In the last 90 days: 0 encounters    Treatment Recommendations:  ED Recommendations: Recommendations as per neurology: Give migraine protocol: using steroid protocol d/t toradol allergy  Recommend calling her [address] [facility] Neurologist to schedule close outpatient follow up  It is noted that the patient will provide other complaints to the ER providers to get admitted and then will complain of migraine in the hospital    Would not offer transfer for Ketamine Infusion to this patient as it has not worked in the past  This should be left up to Neurology to determine if this is appropriate  Inpatient Recommendations: Early consultation with neurology  Avoid narcotics/sedating agents due to over sedation in the past       Outpatient recommendations: Needs close follow up with [facility] Neurology  Needs CM to make sure that patient does not run out of her meds as she has in the past     Situation: Patient who presents frequently for migraines  It seems that she has started using other chief complaints to get admitted to the hospital for migraine treatment   As per neurology colleague her headache symptomatology does not usually change with treatment      PMH/PSH:  Migraine, Depression, Bipolar, Fibromyalgia, HTN, Hx of DVT      Assessment                              Drivers of repeated utilization:                 Symptomatology     Community Resources in place:    None - she has mentioned that she does not have a  for infusions  May need assistance with transportation and with medications   Patient Care Team:   Fareed Martinez MD - PCP [facility])  Macho Cortez, 1 MOON Stevens/Luigi Holley MD - Neurology [facility])  OP Care Manager: Chu Cheek RN              Care plan date and owner: Michelle Farmer PA-C 10/31/2019         Reviewed with patient before discharge?

## 2022-08-25 ENCOUNTER — DOCUMENTATION (OUTPATIENT)
Dept: CASE MANAGEMENT | Facility: OTHER | Age: 62
End: 2022-08-25

## 2022-09-15 ENCOUNTER — APPOINTMENT (OUTPATIENT)
Dept: LAB | Facility: CLINIC | Age: 62
End: 2022-09-15
Payer: COMMERCIAL

## 2022-09-15 DIAGNOSIS — D50.9 IRON DEFICIENCY ANEMIA, UNSPECIFIED IRON DEFICIENCY ANEMIA TYPE: ICD-10-CM

## 2022-09-15 LAB
25(OH)D3 SERPL-MCNC: 31.9 NG/ML (ref 30–100)
ALBUMIN SERPL BCP-MCNC: 4 G/DL (ref 3.5–5)
ALP SERPL-CCNC: 152 U/L (ref 34–104)
ALT SERPL W P-5'-P-CCNC: 7 U/L (ref 7–52)
ANION GAP SERPL CALCULATED.3IONS-SCNC: 9 MMOL/L (ref 4–13)
AST SERPL W P-5'-P-CCNC: 14 U/L (ref 13–39)
BASOPHILS # BLD AUTO: 0.05 THOUSANDS/ΜL (ref 0–0.1)
BASOPHILS NFR BLD AUTO: 0 % (ref 0–1)
BILIRUB SERPL-MCNC: 0.31 MG/DL (ref 0.2–1)
BUN SERPL-MCNC: 24 MG/DL (ref 5–25)
CALCIUM SERPL-MCNC: 9.6 MG/DL (ref 8.4–10.2)
CHLORIDE SERPL-SCNC: 100 MMOL/L (ref 96–108)
CHOLEST SERPL-MCNC: 257 MG/DL
CO2 SERPL-SCNC: 25 MMOL/L (ref 21–32)
CREAT SERPL-MCNC: 0.9 MG/DL (ref 0.6–1.3)
EOSINOPHIL # BLD AUTO: 0 THOUSAND/ΜL (ref 0–0.61)
EOSINOPHIL NFR BLD AUTO: 0 % (ref 0–6)
ERYTHROCYTE [DISTWIDTH] IN BLOOD BY AUTOMATED COUNT: 16.4 % (ref 11.6–15.1)
EST. AVERAGE GLUCOSE BLD GHB EST-MCNC: 126 MG/DL
FERRITIN SERPL-MCNC: 9 NG/ML (ref 8–388)
GFR SERPL CREATININE-BSD FRML MDRD: 68 ML/MIN/1.73SQ M
GLUCOSE P FAST SERPL-MCNC: 143 MG/DL (ref 65–99)
HBA1C MFR BLD: 6 %
HCT VFR BLD AUTO: 36.3 % (ref 34.8–46.1)
HDLC SERPL-MCNC: 66 MG/DL
HGB BLD-MCNC: 12.1 G/DL (ref 11.5–15.4)
IMM GRANULOCYTES # BLD AUTO: 0.24 THOUSAND/UL (ref 0–0.2)
IMM GRANULOCYTES NFR BLD AUTO: 1 % (ref 0–2)
IRON SERPL-MCNC: 36 UG/DL (ref 50–170)
LDLC SERPL CALC-MCNC: 167 MG/DL (ref 0–100)
LYMPHOCYTES # BLD AUTO: 1.05 THOUSANDS/ΜL (ref 0.6–4.47)
LYMPHOCYTES NFR BLD AUTO: 6 % (ref 14–44)
MCH RBC QN AUTO: 27.3 PG (ref 26.8–34.3)
MCHC RBC AUTO-ENTMCNC: 33.3 G/DL (ref 31.4–37.4)
MCV RBC AUTO: 82 FL (ref 82–98)
MONOCYTES # BLD AUTO: 0.51 THOUSAND/ΜL (ref 0.17–1.22)
MONOCYTES NFR BLD AUTO: 3 % (ref 4–12)
NEUTROPHILS # BLD AUTO: 16.41 THOUSANDS/ΜL (ref 1.85–7.62)
NEUTS SEG NFR BLD AUTO: 90 % (ref 43–75)
NONHDLC SERPL-MCNC: 191 MG/DL
NRBC BLD AUTO-RTO: 0 /100 WBCS
PLATELET # BLD AUTO: 502 THOUSANDS/UL (ref 149–390)
PMV BLD AUTO: 9 FL (ref 8.9–12.7)
POTASSIUM SERPL-SCNC: 4.4 MMOL/L (ref 3.5–5.3)
PROT SERPL-MCNC: 7.6 G/DL (ref 6.4–8.4)
RBC # BLD AUTO: 4.44 MILLION/UL (ref 3.81–5.12)
SODIUM SERPL-SCNC: 134 MMOL/L (ref 135–147)
T4 FREE SERPL-MCNC: 0.85 NG/DL (ref 0.76–1.46)
TIBC SERPL-MCNC: 544 UG/DL (ref 250–450)
TRIGL SERPL-MCNC: 122 MG/DL
TSH SERPL DL<=0.05 MIU/L-ACNC: 0.43 UIU/ML (ref 0.45–4.5)
WBC # BLD AUTO: 18.26 THOUSAND/UL (ref 4.31–10.16)

## 2022-09-15 PROCEDURE — 36415 COLL VENOUS BLD VENIPUNCTURE: CPT

## 2022-09-15 PROCEDURE — 83540 ASSAY OF IRON: CPT

## 2022-09-15 PROCEDURE — 84443 ASSAY THYROID STIM HORMONE: CPT

## 2022-09-15 PROCEDURE — 82306 VITAMIN D 25 HYDROXY: CPT

## 2022-09-15 PROCEDURE — 80061 LIPID PANEL: CPT

## 2022-09-15 PROCEDURE — 80053 COMPREHEN METABOLIC PANEL: CPT

## 2022-09-15 PROCEDURE — 83550 IRON BINDING TEST: CPT

## 2022-09-15 PROCEDURE — 85025 COMPLETE CBC W/AUTO DIFF WBC: CPT

## 2022-09-15 PROCEDURE — 83036 HEMOGLOBIN GLYCOSYLATED A1C: CPT

## 2022-09-15 PROCEDURE — 82728 ASSAY OF FERRITIN: CPT

## 2022-09-15 PROCEDURE — 84439 ASSAY OF FREE THYROXINE: CPT

## 2022-10-11 ENCOUNTER — APPOINTMENT (OUTPATIENT)
Dept: LAB | Facility: CLINIC | Age: 62
End: 2022-10-11
Payer: COMMERCIAL

## 2022-10-11 ENCOUNTER — CONSULT (OUTPATIENT)
Dept: HEMATOLOGY ONCOLOGY | Facility: CLINIC | Age: 62
End: 2022-10-11
Payer: COMMERCIAL

## 2022-10-11 ENCOUNTER — TELEPHONE (OUTPATIENT)
Dept: HEMATOLOGY ONCOLOGY | Facility: CLINIC | Age: 62
End: 2022-10-11

## 2022-10-11 VITALS
WEIGHT: 253 LBS | HEART RATE: 88 BPM | TEMPERATURE: 97.3 F | SYSTOLIC BLOOD PRESSURE: 144 MMHG | HEIGHT: 68 IN | RESPIRATION RATE: 16 BRPM | BODY MASS INDEX: 38.34 KG/M2 | OXYGEN SATURATION: 98 % | DIASTOLIC BLOOD PRESSURE: 80 MMHG

## 2022-10-11 DIAGNOSIS — Z79.01 CHRONIC ANTICOAGULATION: ICD-10-CM

## 2022-10-11 DIAGNOSIS — D50.8 IRON DEFICIENCY ANEMIA FOLLOWING BARIATRIC SURGERY: ICD-10-CM

## 2022-10-11 DIAGNOSIS — D75.839 THROMBOCYTOSIS: ICD-10-CM

## 2022-10-11 DIAGNOSIS — I26.99 PE (PULMONARY THROMBOEMBOLISM) (HCC): ICD-10-CM

## 2022-10-11 DIAGNOSIS — D72.829 LEUKOCYTOSIS, UNSPECIFIED TYPE: ICD-10-CM

## 2022-10-11 DIAGNOSIS — D64.9 NORMOCYTIC ANEMIA: Primary | ICD-10-CM

## 2022-10-11 DIAGNOSIS — K95.89 IRON DEFICIENCY ANEMIA FOLLOWING BARIATRIC SURGERY: ICD-10-CM

## 2022-10-11 DIAGNOSIS — I82.491 ACUTE DEEP VEIN THROMBOSIS (DVT) OF OTHER SPECIFIED VEIN OF RIGHT LOWER EXTREMITY (HCC): ICD-10-CM

## 2022-10-11 DIAGNOSIS — D64.9 NORMOCYTIC ANEMIA: ICD-10-CM

## 2022-10-11 LAB
ERYTHROCYTE [DISTWIDTH] IN BLOOD BY AUTOMATED COUNT: 17.2 % (ref 11.6–15.1)
FERRITIN SERPL-MCNC: 10 NG/ML (ref 8–388)
FOLATE SERPL-MCNC: 15.1 NG/ML (ref 3.1–17.5)
HCT VFR BLD AUTO: 34.7 % (ref 34.8–46.1)
HGB BLD-MCNC: 11 G/DL (ref 11.5–15.4)
HYPERCHROMIA BLD QL SMEAR: PRESENT
IMM EOSINOPHIL NFR BLD MANUAL: 3 % (ref 0–6)
IRON SATN MFR SERPL: 5 % (ref 15–50)
IRON SERPL-MCNC: 27 UG/DL (ref 50–170)
LYMPHOCYTES NFR BLD: 20 % (ref 14–44)
MACROCYTES BLD QL AUTO: PRESENT
MCH RBC QN AUTO: 26.6 PG (ref 26.8–34.3)
MCHC RBC AUTO-ENTMCNC: 31.7 G/DL (ref 31.4–37.4)
MCV RBC AUTO: 84 FL (ref 82–98)
MONOCYTES NFR BLD AUTO: 9 % (ref 4–12)
NEUTS HYPERSEG BLD QL SMEAR: PRESENT
NEUTS SEG NFR BLD AUTO: 68 % (ref 45–77)
NRBC BLD AUTO-RTO: 0 /100 WBCS
PLATELET # BLD AUTO: 484 THOUSANDS/UL (ref 149–390)
PLATELET BLD QL SMEAR: ABNORMAL
PMV BLD AUTO: 8.7 FL (ref 8.9–12.7)
RBC # BLD AUTO: 4.13 MILLION/UL (ref 3.81–5.12)
RBC MORPH BLD: PRESENT
TARGETS BLD QL SMEAR: PRESENT
TIBC SERPL-MCNC: 495 UG/DL (ref 250–450)
TOTAL CELLS COUNTED SPEC: 100
VIT B12 SERPL-MCNC: 320 PG/ML (ref 100–900)
WBC # BLD AUTO: 10.03 THOUSAND/UL (ref 4.31–10.16)

## 2022-10-11 PROCEDURE — 83918 ORGANIC ACIDS TOTAL QUANT: CPT

## 2022-10-11 PROCEDURE — 83550 IRON BINDING TEST: CPT

## 2022-10-11 PROCEDURE — 83540 ASSAY OF IRON: CPT

## 2022-10-11 PROCEDURE — 82746 ASSAY OF FOLIC ACID SERUM: CPT

## 2022-10-11 PROCEDURE — 82607 VITAMIN B-12: CPT

## 2022-10-11 PROCEDURE — 36415 COLL VENOUS BLD VENIPUNCTURE: CPT

## 2022-10-11 PROCEDURE — 85027 COMPLETE CBC AUTOMATED: CPT

## 2022-10-11 PROCEDURE — 82728 ASSAY OF FERRITIN: CPT

## 2022-10-11 PROCEDURE — 99244 OFF/OP CNSLTJ NEW/EST MOD 40: CPT

## 2022-10-11 PROCEDURE — 85007 BL SMEAR W/DIFF WBC COUNT: CPT

## 2022-10-11 RX ORDER — SODIUM CHLORIDE 9 MG/ML
20 INJECTION, SOLUTION INTRAVENOUS ONCE
Status: CANCELLED | OUTPATIENT
Start: 2022-10-24

## 2022-10-11 RX ORDER — ATORVASTATIN CALCIUM 20 MG/1
20 TABLET, FILM COATED ORAL DAILY
COMMUNITY

## 2022-10-11 NOTE — PROGRESS NOTES
[place] [address]  Hematology Ambulatory Consult  Mela Duncan, 1960, [id number]  10/11/2022    Assessment/Plan:  1  Normocytic anemia  2  Iron deficiency anemia following bariatric surgery  3  Thrombocytosis  4  Leukocytosis, unspecified type  Ms Bridget Hunter is a 58-year-old female seen in consultation for longstanding history of iron deficiency and now with thrombocytosis  Her thrombocytosis and leukocytosis is likely reactive to her significant iron deficiency  Her most recent ferritin was noted to be 9  She has been on oral iron for about 1 year and has caused her significant constipation  She likely has some slight malabsorption after gastric bypass surgery in 2018  She does not have any evidence of chronic blood loss  She is to stop oral iron  She is up-to-date on her colonoscopy as of July of this year  Will also check for O50 or folic acid deficiencies  I explained that if these counts are not corrected after adequately supplementing with IV iron further workup of the leukocytosis and thrombocytosis may be necessary  Will also check a peripheral smear and if any suspicious cyst findings on this a bone marrow biopsy may be necessary  IV iron was recommended  Two types of IV iron were discussed: Venofer and Feraheme  Patient understands dosing and schedule differences between the two medications  We discussed that if she does not get a robust or sustainable response from IV Venofer 200 mg x 6 doses we will attempt to obtain approval for Feraheme from her insurance  We discussed potential side effects which include but are not limited to IV site reactions, tattooing, hypotension, arthralgias, headache, nausea, and anaphylaxis  If patient experiences any side effects they are to call the office to discuss premedications are necessary for subsequent dosing    She knows to call the office if her symptoms do not improve or resolve 1 month after completing IV iron  Otherwise she will repeat blood work 3 months from today with prior to follow-up with me in the office     - CBC and differential; Future  - Iron Panel (Includes Ferritin, Iron Sat%, Iron, and TIBC); Future  - Vitamin B12; Future  - Methylmalonic acid, serum; Future  - Folate; Future  - Peripheral Smear; Future    5  PE (pulmonary thromboembolism) ([facility] )  6  Acute deep vein thrombosis (DVT) of other specified vein of right lower extremity ([facility] )  7  Chronic anticoagulation  She has a history of DVT and PE on lifelong anticoagulation with Xarelto  She is tolerating this well  Will continue on Xarelto indefinitely  The patient is scheduled for follow-up in approximately 3 months  Patient voiced agreement and understanding to the above  Patient knows to call the Hematology/Oncology office with any questions and concerns regarding the above  Barrier(s) to care: None  The patient is able to self care     -------------------------------------------------------------------------------------------------------    Chief Complaint   Patient presents with   • Consult       Referring provider:  MD Jessica Zac[address]  Riley [address]    History of present illness:  Jeremy Beauchamp is a 68-year-old female with past medical history of hypertension, migraines, osteoarthritis, hyperlipidemia, fibromyalgia, and DVT with PE  She states that she had a DVT and PE in 2018 and was treated with Eliquis and recently switched to Xarelto due to insurance coverage indefinitely  She is tolerating this well without any excess bleeding or bruising  She was previously following with Hematology at [facility] and was being treated with oral iron for iron deficiency microcytic anemia  This has caused her significant constipation, no vomiting/nausea us or abdominal pain  She does have a history of gastric bypass, gastric sleeve, in 2018    She denies ever being told that she or a family member is diagnosed with thalassemia  She denies any family history of leukemia or lymphoma  Her last colonoscopy was in 2021, good for 5 years  She denies pagophagia, pica, restless leg syndrome, brittle nails, thinning hair, or pallor  She denies coffee ground stools, tarry stools, bright red blood per rectum, hematuria, or vaginal bleeding  She denies shortness of breath, or chest pain  She denies fevers, chills, unintentional weight loss, abdominal pain/distension, nausea, vomiting, diarrhea, petechiae/purpura, or LE swelling  Her current symptoms include fatigue, lightheadedness, dizziness, dyspnea on exertion, heart palpitations, hot flashes, constipation, unexplained/easy bruising  11/30/2018:  Hemoglobin 11 3, MCV 79, platelets 873, WBC 8 7  04/11/2019:  Hemoglobin 8 7, MCV 82, platelets 341, WBC 6 80   Ferritin 12, serum iron 27, TIBC 396  12/06/2021:  Hemoglobin 10 9, MCV 87, platelets 800, WBC 03 84   Ferritin 15, serum iron 30, TIBC 501  09/15/2022:  Hemoglobin 12 1, MCV 82, platelets 832, WBC 69 70   Ferritin 9, serum iron 36, TIBC 544      Review of Systems   Constitutional: Positive for fatigue  Negative for activity change, appetite change, diaphoresis, fever and unexpected weight change  HENT: Negative for trouble swallowing and voice change  Eyes: Negative for photophobia and visual disturbance  Respiratory: Negative for cough, chest tightness and shortness of breath (+[place])  Cardiovascular: Positive for palpitations  Negative for chest pain and leg swelling  Gastrointestinal: Positive for constipation  Negative for abdominal distention, abdominal pain, anal bleeding, blood in stool, diarrhea, nausea and vomiting  Endocrine: Positive for heat intolerance  Negative for cold intolerance  Genitourinary: Negative for dysuria, hematuria, urgency and vaginal bleeding     Musculoskeletal: Negative for back pain, gait problem and joint swelling  Skin: Negative for pallor and rash  Neurological: Positive for dizziness, weakness and light-headedness  Hematological: Negative for adenopathy  Bruises/bleeds easily  Psychiatric/Behavioral: Negative for confusion and sleep disturbance         Patient Active Problem List   Diagnosis   • Bipolar depression ([facility] [address] )   • Hypokalemia   • Vertigo   • Fibromyalgia   • Spinal stenosis of lumbar region   • Osteoarthritis of lumbosacral spine without myelopathy   • Migraine   • HLD (hyperlipidemia)   • Syncope   • Orthostatic hypotension   • History of TIAs   • History of migraine   • Neck pain   • Low back pain   • PE (pulmonary thromboembolism) (HCC)   • Hypertension   • Bipolar disorder   • Chronic back pain   • DVT, lower extremity ([facility])   • Anxiety   • Leukocytosis   • Chronic anticoagulation   • Anemia   • Ambulatory dysfunction   • Edema   • Right arm pain   • Dizziness   • Intractable migraine without aura and without status migrainosus   • Closed head injury with brief loss of consciousness ([facility] )   • Shoulder pain   • Class 2 obesity due to excess calories with body mass index (BMI) of 35 0 to 35 9 in adult   • Closed fracture of distal ends of left radius and ulna   • Fall down stairs   • Fracture of multiple ribs of right side   • Hematoma of parietal scalp   • Bilateral pulmonary contusion   • History of anxiety   • History of pulmonary embolism   • Right distal ulnar fracture   • Primary osteoarthritis of left shoulder   • Status post total replacement of left shoulder   • Aftercare following surgery of the musculoskeletal system, NEC   • Contusion of right hand   • Alkalosis   • Impingement syndrome of right shoulder   • Full thickness tear of left subscapularis tendon   • S/P reverse total shoulder arthroplasty, left   • Aftercare following left shoulder joint replacement surgery   • Iron deficiency anemia following bariatric surgery       Past Medical History:   Diagnosis Date   • Adrenal insufficiency ([place]'s disease) ([facility] [address] )    • Anxiety    • Arthritis    • Bipolar disorder    • Cervical radiculopathy    • Chronic back pain    • Chronic pain disorder     lumbar   • Depression    • DVT, lower extremity ([facility] )     1991 and 2019 now on eliquis   • Exercises 5 to 6 times per week     5 days per week with weights/band and also goes to PT 2x/week   • Fibromyalgia    • History of Clostridioides difficile infection    • History of MRSA infection     pt denies having this   • History of recent fall 07/2021    "possibly injured left shoulder'   • History of TIAs     cannot remember details   • Hypertension    • Hypokalemia    • Left shoulder pain     "not too bad" goes to PT 2x/week   • Migraine    • Psychiatric disorder     Anxiety, major depression, bipolar   • Spinal stenosis    • Stroke [facility])     pt reports" not remembering having a stroke"   • Syncope 2014    orthostatic hypotension   • Wears dentures     upper   • Wears glasses        Past Surgical History:   Procedure Laterality Date   • APPENDECTOMY     • CAST APPLICATION Left 2/9/9077    Procedure: Application short-arm splint;  Surgeon: Kandice Schmidt MD;  [facility]: [facility];  Service: Orthopedics   • COLONOSCOPY     • FL INJECTION LEFT SHOULDER (ARTHROGRAM)  11/10/2021   • GASTRIC RESTRICTION SURGERY      Gastric Sleeve Nov 2015   • HYSTERECTOMY      DONALD   • JOINT REPLACEMENT      Left Knee 2011 and Right Hip 2010   • ORIF WRIST FRACTURE Left 7/4/2020    Procedure: Open reduction and internal fixation left radius and ulnar shaft fracture;  Surgeon: Kandice Schmidt MD;  [facility];  Service: Orthopedics   • MO RECONSTR TOTAL SHOULDER IMPLANT Left 3/30/2021    Procedure: ARTHROPLASTY SHOULDER - anatomic;  Surgeon: Yaya Reveles MD;  Location[facility];  Service: Orthopedics   • MO NATIVIDAD SHOULDER ARTHRPLSTY HUMERAL&GLENOID COMPNT Left 12/21/2021    Procedure: REVISION OF TOTAL SHOULDER ARTHROPLASTY TO REVERSE TOTAL SHOULDER ARTHROPLASTY;  Surgeon: Idalia Cueva MD;  Location[facility];  Service: Orthopedics       Family History   Problem Relation Age of Onset   • Breast cancer Mother    • Migraines Mother    • Arthritis Mother    • Kidney disease Father    • Heart disease Father    • Diabetes Father    • Arthritis Father    • Brain cancer Brother    • Seizures Brother        Social History     Socioeconomic History   • Marital status: /Civil Union     Spouse name: None   • Number of children: None   • Years of education: None   • Highest education level: None   Occupational History   • None   Tobacco Use   • Smoking status: Former Smoker     Packs/day: 0 20     Years: 20 00     Pack years: 4 00     Types: Cigarettes     Start date:      Quit date:      Years since quittin 7   • Smokeless tobacco: Never Used   • Tobacco comment: quit   Vaping Use   • Vaping Use: Never used   Substance and Sexual Activity   • Alcohol use: Not Currently     Alcohol/week: 2 0 standard drinks     Types: 1 Glasses of wine, 1 Standard drinks or equivalent per week     Comment: occasionally   • Drug use: Never     Comment: na   • Sexual activity: None     Comment: defer   Other Topics Concern   • None   Social History Narrative   • None     Social Determinants of Health     Financial Resource Strain: Not on file   Food Insecurity: Not on file   Transportation Needs: Not on file   Physical Activity: Not on file   Stress: Not on file   Social Connections: Not on file   Intimate Partner Violence: Not on file   Housing Stability: Not on file         Current Outpatient Medications:   •  amLODIPine (NORVASC) 2 5 mg tablet, Take 2 5 mg by mouth daily at bedtime  , Disp: , Rfl:   •  ARIPiprazole (ABILIFY) 5 mg tablet, TAKE 1 TABLET EVERY MORNING (CORRECTION BY DR OF PREV SCRIPT), Disp: , Rfl:   •  atorvastatin (LIPITOR) 20 mg tablet, Take 20 mg by mouth daily, Disp: , Rfl:   •  ferrous sulfate 325 (65 Fe) mg tablet, Take 325 mg by mouth daily with breakfast, Disp: , Rfl:   •  furosemide (LASIX) 20 mg tablet, Take 20 mg by mouth as needed Pt reports calls her [facility] cardiologist Dr Genevia Boxer before taking , Disp: , Rfl:   •  gabapentin (NEURONTIN) 600 MG tablet, Take 600 mg by mouth 3 (three) times a day, Disp: , Rfl:   •  lamoTRIgine (LaMICtal) 200 MG tablet, Take 200 mg by mouth daily  , Disp: , Rfl:   •  LORazepam (ATIVAN) 2 mg tablet, , Disp: , Rfl:   •  meclizine (ANTIVERT) 12 5 MG tablet, Take 1 tablet (12 5 mg total) by mouth every 8 (eight) hours as needed for dizziness for up to 7 days, Disp: 21 tablet, Rfl: 0  •  potassium chloride (K-DUR,KLOR-CON) 20 mEq tablet, Take 20 mEq by mouth daily  , Disp: , Rfl:   •  Xarelto 20 MG tablet, , Disp: , Rfl:   •  gabapentin (NEURONTIN) 300 mg capsule, Take 1 capsule (300 mg total) by mouth 3 (three) times a day (Patient taking differently: Take 600 mg by mouth 3 (three) times a day  ), Disp: 30 capsule, Rfl: 0  •  LORazepam (ATIVAN) 1 mg tablet, Take 2 tablets (2 mg total) by mouth 2 (two) times a day for 10 days (Patient taking differently: Take 2 mg by mouth 3 (three) times a day One in morning, one tab afternoon two tablet at bedtime ), Disp: 10 tablet, Rfl: 0    Allergies   Allergen Reactions   • Ketorolac Itching and Other (See Comments)     [toradol] Itching, hives   • Mushroom Extract Complex - Food Allergy Hives       Objective:  /80 (BP Location: Right arm, Patient Position: Sitting, Cuff Size: Large)   Pulse 88   Temp (!) 97 3 °F (36 3 °C) (Tympanic)   Resp 16   Ht 5' 8" (1 727 m)   Wt 115 kg (253 lb)   SpO2 98%   BMI 38 47 kg/m²   Physical Exam  Constitutional:       General: She is not in acute distress  Appearance: Normal appearance  She is not ill-appearing  HENT:      Head: Normocephalic and atraumatic  Eyes:      Extraocular Movements: Extraocular movements intact        Conjunctiva/sclera: Conjunctivae normal    Cardiovascular:      Rate and Rhythm: Normal rate and regular rhythm  Pulses: Normal pulses  Heart sounds: Normal heart sounds  No murmur heard  Pulmonary:      Effort: Pulmonary effort is normal  No respiratory distress  Breath sounds: Normal breath sounds  Abdominal:      General: Bowel sounds are normal  There is no distension  Palpations: Abdomen is soft  Tenderness: There is no abdominal tenderness  Musculoskeletal:         General: Normal range of motion  Cervical back: Normal range of motion  No tenderness  Right lower leg: No edema  Left lower leg: No edema  Lymphadenopathy:      Cervical: No cervical adenopathy  Skin:     General: Skin is warm and dry  Capillary Refill: Capillary refill takes less than 2 seconds  Coloration: Skin is pale  Findings: No bruising or lesion  Neurological:      General: No focal deficit present  Mental Status: She is alert and oriented to person, place, and time  Mental status is at baseline  Motor: No weakness  Gait: Gait normal    Psychiatric:         Mood and Affect: Mood normal          Behavior: Behavior normal          Thought Content: Thought content normal          Judgment: Judgment normal          Result Review  Labs:   Appointment on 09/15/2022   Component Date Value Ref Range Status   • WBC 09/15/2022 18 26 (A) 4 31 - 10 16 Thousand/uL Final   • RBC 09/15/2022 4 44  3 81 - 5 12 Million/uL Final   • Hemoglobin 09/15/2022 12 1  11 5 - 15 4 g/dL Final   • Hematocrit 09/15/2022 36 3  34 8 - 46 1 % Final   • MCV 09/15/2022 82  82 - 98 fL Final   • MCH 09/15/2022 27 3  26 8 - 34 3 pg Final   • MCHC 09/15/2022 33 3  31 4 - 37 4 g/dL Final   • RDW 09/15/2022 16 4 (A) 11 6 - 15 1 % Final   • MPV 09/15/2022 9 0  8 9 - 12 7 fL Final   • Platelets 46/01/9045 502 (A) 149 - 390 Thousands/uL Final   • nRBC 09/15/2022 0  /100 WBCs Final    This is an appended report  These results have been appended to a previously preliminary verified report     • Neutrophils Relative 09/15/2022 90 (A) 43 - 75 % Final   • Immat GRANS % 09/15/2022 1  0 - 2 % Final   • Lymphocytes Relative 09/15/2022 6 (A) 14 - 44 % Final   • Monocytes Relative 09/15/2022 3 (A) 4 - 12 % Final   • Eosinophils Relative 09/15/2022 0  0 - 6 % Final   • Basophils Relative 09/15/2022 0  0 - 1 % Final   • Neutrophils Absolute 09/15/2022 16 41 (A) 1 85 - 7 62 Thousands/µL Final   • Immature Grans Absolute 09/15/2022 0 24 (A) 0 00 - 0 20 Thousand/uL Final   • Lymphocytes Absolute 09/15/2022 1 05  0 60 - 4 47 Thousands/µL Final   • Monocytes Absolute 09/15/2022 0 51  0 17 - 1 22 Thousand/µL Final   • Eosinophils Absolute 09/15/2022 0 00  0 00 - 0 61 Thousand/µL Final   • Basophils Absolute 09/15/2022 0 05  0 00 - 0 10 Thousands/µL Final   • Sodium 09/15/2022 134 (A) 135 - 147 mmol/L Final   • Potassium 09/15/2022 4 4  3 5 - 5 3 mmol/L Final   • Chloride 09/15/2022 100  96 - 108 mmol/L Final   • CO2 09/15/2022 25  21 - 32 mmol/L Final   • ANION GAP 09/15/2022 9  4 - 13 mmol/L Final   • BUN 09/15/2022 24  5 - 25 mg/dL Final   • Creatinine 09/15/2022 0 90  0 60 - 1 30 mg/dL Final    Standardized to IDMS reference method   • Glucose, Fasting 09/15/2022 143 (A) 65 - 99 mg/dL Final    Specimen collection should occur prior to Sulfasalazine administration due to the potential for falsely depressed results  Specimen collection should occur prior to Sulfapyridine administration due to the potential for falsely elevated results  • Calcium 09/15/2022 9 6  8 4 - 10 2 mg/dL Final   • AST 09/15/2022 14  13 - 39 U/L Final    Specimen collection should occur prior to Sulfasalazine administration due to the potential for falsely depressed results  • ALT 09/15/2022 7  7 - 52 U/L Final    Specimen collection should occur prior to Sulfasalazine administration due to the potential for falsely depressed results      • Alkaline Phosphatase 09/15/2022 152 (A) 34 - 104 U/L Final   • Total Protein 09/15/2022 7 6  6 4 - 8 4 g/dL Final   • Albumin 09/15/2022 4 0  3 5 - 5 0 g/dL Final   • Total Bilirubin 09/15/2022 0 31  0 20 - 1 00 mg/dL Final   • eGFR 09/15/2022 68  ml/min/1 73sq m Final   • Vit D, 25-Hydroxy 09/15/2022 31 9  30 0 - 100 0 ng/mL Final   • TSH 3RD GENERATON 09/15/2022 0 431 (A) 0 450 - 4 500 uIU/mL Final    The recommended reference ranges for TSH during pregnancy are as follows:   First trimester 0 1 to 2 5 uIU/mL   Second trimester  0 2 to 3 0 uIU/mL   Third trimester 0 3 to 3 0 uIU/m    Note: Normal ranges may not apply to patients who are transgender, non-binary, or whose legal sex, sex at birth, and gender identity differ  Adult TSH (3rd generation) reference range follows the recommended guidelines of the American Thyroid Association, January, 2020  • Hemoglobin A1C 09/15/2022 6 0 (A) Normal 3 8-5 6%; PreDiabetic 5 7-6 4%; Diabetic >=6 5%; Glycemic control for adults with diabetes <7 0% % Final   • EAG 09/15/2022 126  mg/dl Final   • Cholesterol 09/15/2022 257 (A) See Comment mg/dL Final    Cholesterol:         Pediatric <18 Years        Desirable          <170 mg/dL      Borderline High    170-199 mg/dL      High               >=200 mg/dL        Adult >=18 Years            Desirable         <200 mg/dL      Borderline High   200-239 mg/dL      High              >239 mg/dL     • Triglycerides 09/15/2022 122  See Comment mg/dL Final    Triglyceride:     0-9Y            <75mg/dL     10Y-17Y         <90 mg/dL       >=18Y     Normal          <150 mg/dL     Borderline High 150-199 mg/dL     High            200-499 mg/dL        Very High       >499 mg/dL    Specimen collection should occur prior to N-Acetylcysteine or Metamizole administration due to the potential for falsely depressed results     • HDL, Direct 09/15/2022 66  >=50 mg/dL Final   • LDL Calculated 09/15/2022 167 (A) 0 - 100 mg/dL Final    LDL Cholesterol:     Optimal           <100 mg/dl     Near Optimal      100-129 mg/dl     Above Optimal       Borderline High 130-159 mg/dl       High            160-189 mg/dl       Very High       >189 mg/dl         This screening LDL is a calculated result  It does not have the accuracy of the Direct Measured LDL in the monitoring of patients with hyperlipidemia and/or statin therapy  Direct Measure LDL (BAN847) must be ordered separately in these patients  • Non-HDL-Chol (CHOL-HDL) 09/15/2022 191  mg/dl Final   • Ferritin 09/15/2022 9  8 - 388 ng/mL Final   • TIBC 09/15/2022 544 (A) 250 - 450 ug/dL Final   • Iron 09/15/2022 36 (A) 50 - 170 ug/dL Final    Patients treated with metal-binding drugs (ie  Deferoxamine) may have depressed iron values  • Free T4 09/15/2022 0 85  0 76 - 1 46 ng/dL Final    Specimen collection should occur prior to Sulfasalazine administration due to the potential for falsely elevated results  Imaging:  No relevant imaging to review     Please note: This report has been generated by a voice recognition software system  Therefore there may be syntax, spelling, and/or grammatical errors  Please call if you have any questions

## 2022-10-11 NOTE — TELEPHONE ENCOUNTER
While we try to accommodate patient requests, our priority is to schedule treatment according to Doctor's orders and site availability  1  Does the Provider use the intake sheet or checkout note? Note  2  What would be a preferred day of the week that would work best for your infusion appointment? No Preferance  3  Do you prefer mornings or afternoons for your appointments? Mid-Mornings if possible  4  Are there any days or dates that do not work for your schedule, including any upcoming vacations? No  5  We are going to try our best to schedule you at the infusion center closest to your home  In the event that we are unable to what would be your next preferred infusion site or sites? [facility]  6  Do you have transportation to take you to all of your appointments? Yes  7   Would you like the infusion center to draw labs from your port? (disregard if patient doesn't have a port or need labs for infusion appointment) N/A

## 2022-10-11 NOTE — TELEPHONE ENCOUNTER
Left a detailed message with my teams number to return my call  I made patient aware she is scheduled for 6 iron infusions, patient was provided with time and date of first infusion  Patient was also made aware schedule can be seen on mychart

## 2022-10-11 NOTE — TELEPHONE ENCOUNTER
Please schedule iron infusions, no preference on the day but will like to be scheduled mid-morning  Thanks!

## 2022-10-12 ENCOUNTER — TELEPHONE (OUTPATIENT)
Dept: HEMATOLOGY ONCOLOGY | Facility: CLINIC | Age: 62
End: 2022-10-12

## 2022-10-12 NOTE — TELEPHONE ENCOUNTER
Good morning, patient is scheduled to start iron infusions on 10/24  Patient return my call and is asking if its possible to be scheduled sometime next week due to feeling weak  Thanks!

## 2022-10-12 NOTE — TELEPHONE ENCOUNTER
Patient was made aware of changes  Per patient okay with Friday infusion being scheduled in the afternoon  Patient will check mychart for rest of schedule

## 2022-10-14 LAB — METHYLMALONATE SERPL-SCNC: 418 NMOL/L (ref 0–378)

## 2022-10-18 ENCOUNTER — TELEPHONE (OUTPATIENT)
Dept: HEMATOLOGY ONCOLOGY | Facility: CLINIC | Age: 62
End: 2022-10-18

## 2022-10-18 DIAGNOSIS — D50.8 IRON DEFICIENCY ANEMIA FOLLOWING BARIATRIC SURGERY: ICD-10-CM

## 2022-10-18 DIAGNOSIS — K95.89 IRON DEFICIENCY ANEMIA FOLLOWING BARIATRIC SURGERY: ICD-10-CM

## 2022-10-18 DIAGNOSIS — E53.8 B12 DEFICIENCY: Primary | ICD-10-CM

## 2022-10-18 RX ORDER — CYANOCOBALAMIN 1000 UG/ML
1000 INJECTION, SOLUTION INTRAMUSCULAR; SUBCUTANEOUS ONCE
Status: CANCELLED | OUTPATIENT
Start: 2022-10-21

## 2022-10-18 RX ORDER — CYANOCOBALAMIN 1000 UG/ML
1000 INJECTION, SOLUTION INTRAMUSCULAR; SUBCUTANEOUS ONCE
OUTPATIENT
Start: 2022-12-12

## 2022-10-18 NOTE — TELEPHONE ENCOUNTER
Received voicemail from patient:    "Hi, this is [employer]  My date of birth is [birth year]  I got a message from Moni Antonio yesterday in reference to I'm having iron transfusion starting on Friday and she wanted to know if I want to B12 shots as well because she recommends them and I do want the B12 shot as at my transfusion   So if you could please tell her would be wonderful "

## 2022-10-18 NOTE — TELEPHONE ENCOUNTER
Called cell phone 3 times, call will not go through  I sent patient a [employer] message informing her of the changes  Patient was made aware b12 injections have been added to iron infusions  Gertrudis Sanchez

## 2022-10-18 NOTE — TELEPHONE ENCOUNTER
----- Message from [facility] sent at 10/17/2022 10:51 AM EDT -----  Mikhail Tirado,   Here B12 is also low and therefore I am suggesting B12 injections weekly x4 doses and then transition to monthly  These can be given with her IV iron treatments  Please let my office know if you are okay with this and I will place orders and have this arranged   [phone]

## 2022-10-21 ENCOUNTER — HOSPITAL ENCOUNTER (OUTPATIENT)
Dept: INFUSION CENTER | Facility: CLINIC | Age: 62
End: 2022-10-21
Payer: COMMERCIAL

## 2022-10-21 DIAGNOSIS — D50.8 IRON DEFICIENCY ANEMIA FOLLOWING BARIATRIC SURGERY: ICD-10-CM

## 2022-10-21 DIAGNOSIS — E53.8 B12 DEFICIENCY: Primary | ICD-10-CM

## 2022-10-21 DIAGNOSIS — K95.89 IRON DEFICIENCY ANEMIA FOLLOWING BARIATRIC SURGERY: ICD-10-CM

## 2022-10-21 PROCEDURE — 96372 THER/PROPH/DIAG INJ SC/IM: CPT

## 2022-10-21 PROCEDURE — 96365 THER/PROPH/DIAG IV INF INIT: CPT

## 2022-10-21 RX ORDER — CYANOCOBALAMIN 1000 UG/ML
1000 INJECTION, SOLUTION INTRAMUSCULAR; SUBCUTANEOUS ONCE
Status: COMPLETED | OUTPATIENT
Start: 2022-10-21 | End: 2022-10-21

## 2022-10-21 RX ORDER — CYANOCOBALAMIN 1000 UG/ML
1000 INJECTION, SOLUTION INTRAMUSCULAR; SUBCUTANEOUS ONCE
Status: CANCELLED | OUTPATIENT
Start: 2022-10-28

## 2022-10-21 RX ORDER — SODIUM CHLORIDE 9 MG/ML
20 INJECTION, SOLUTION INTRAVENOUS ONCE
Status: CANCELLED | OUTPATIENT
Start: 2022-10-28

## 2022-10-21 RX ORDER — SODIUM CHLORIDE 9 MG/ML
20 INJECTION, SOLUTION INTRAVENOUS ONCE
Status: COMPLETED | OUTPATIENT
Start: 2022-10-21 | End: 2022-10-21

## 2022-10-21 RX ADMIN — CYANOCOBALAMIN 1000 MCG: 1000 INJECTION, SOLUTION INTRAMUSCULAR at 14:02

## 2022-10-21 RX ADMIN — SODIUM CHLORIDE 20 ML/HR: 0.9 INJECTION, SOLUTION INTRAVENOUS at 14:05

## 2022-10-21 RX ADMIN — SODIUM CHLORIDE 200 MG: 900 INJECTION, SOLUTION INTRAVENOUS at 14:09

## 2022-10-21 NOTE — PROGRESS NOTES
Pt presents for venofer infusion  No new meds or concerns  PT tolerated infusion without adverse reaction  Future visits discussed  AVS declined  Intake assessment completed

## 2022-10-24 ENCOUNTER — TELEPHONE (OUTPATIENT)
Dept: HEMATOLOGY ONCOLOGY | Facility: CLINIC | Age: 62
End: 2022-10-24

## 2022-10-24 DIAGNOSIS — K95.89 IRON DEFICIENCY ANEMIA FOLLOWING BARIATRIC SURGERY: Primary | ICD-10-CM

## 2022-10-24 DIAGNOSIS — D50.8 IRON DEFICIENCY ANEMIA FOLLOWING BARIATRIC SURGERY: Primary | ICD-10-CM

## 2022-10-24 NOTE — TELEPHONE ENCOUNTER
Received voicemail from patient:    "Hi, this is Jae Bill  My date of birth is [birth year]  The reason I'm calling is I had an iron transfusion on Friday and about 15 minutes after I was actually finished, I got very nauseous and I got really severe headache  I don't know if that's normal to happen after the fact  From what I wasn't really sure  Mukul Colin had said she could premet, give me premedication before I took the transfusion  So I don't know if she can help me with this or not  If you could please call me back would be great at [phone]  Thank you "    Called and spoke with patient  Discussed symptoms after iron  She is aware this is common and that we will add premedications to her iron infusions  Patient aware infusion schedulers will call back if appointments need to be adjusted

## 2022-10-24 NOTE — TELEPHONE ENCOUNTER
Please adjust time on infusions schedule as to medication being added  Please see note below for more details  Thank you!

## 2022-10-28 ENCOUNTER — HOSPITAL ENCOUNTER (OUTPATIENT)
Dept: INFUSION CENTER | Facility: CLINIC | Age: 62
End: 2022-10-28
Payer: COMMERCIAL

## 2022-10-28 VITALS
TEMPERATURE: 97.8 F | DIASTOLIC BLOOD PRESSURE: 74 MMHG | SYSTOLIC BLOOD PRESSURE: 130 MMHG | RESPIRATION RATE: 18 BRPM | HEART RATE: 96 BPM

## 2022-10-28 DIAGNOSIS — K95.89 IRON DEFICIENCY ANEMIA FOLLOWING BARIATRIC SURGERY: Primary | ICD-10-CM

## 2022-10-28 DIAGNOSIS — D50.8 IRON DEFICIENCY ANEMIA FOLLOWING BARIATRIC SURGERY: Primary | ICD-10-CM

## 2022-10-28 DIAGNOSIS — E53.8 B12 DEFICIENCY: ICD-10-CM

## 2022-10-28 RX ORDER — SODIUM CHLORIDE 9 MG/ML
20 INJECTION, SOLUTION INTRAVENOUS ONCE
Status: CANCELLED | OUTPATIENT
Start: 2022-11-04

## 2022-10-28 RX ORDER — CYANOCOBALAMIN 1000 UG/ML
1000 INJECTION, SOLUTION INTRAMUSCULAR; SUBCUTANEOUS ONCE
Status: COMPLETED | OUTPATIENT
Start: 2022-10-28 | End: 2022-10-28

## 2022-10-28 RX ORDER — CYANOCOBALAMIN 1000 UG/ML
1000 INJECTION, SOLUTION INTRAMUSCULAR; SUBCUTANEOUS ONCE
Status: CANCELLED | OUTPATIENT
Start: 2022-11-04

## 2022-10-28 RX ORDER — SODIUM CHLORIDE 9 MG/ML
20 INJECTION, SOLUTION INTRAVENOUS ONCE
Status: COMPLETED | OUTPATIENT
Start: 2022-10-28 | End: 2022-10-28

## 2022-10-28 RX ADMIN — CYANOCOBALAMIN 1000 MCG: 1000 INJECTION INTRAMUSCULAR; SUBCUTANEOUS at 13:18

## 2022-10-28 RX ADMIN — DEXAMETHASONE SODIUM PHOSPHATE 8 MG: 10 INJECTION, SOLUTION INTRAMUSCULAR; INTRAVENOUS at 13:26

## 2022-10-28 RX ADMIN — SODIUM CHLORIDE 20 ML/HR: 0.9 INJECTION, SOLUTION INTRAVENOUS at 13:14

## 2022-10-28 RX ADMIN — DIPHENHYDRAMINE HYDROCHLORIDE 25 MG: 50 INJECTION, SOLUTION INTRAMUSCULAR; INTRAVENOUS at 14:00

## 2022-10-28 RX ADMIN — SODIUM CHLORIDE 200 MG: 900 INJECTION, SOLUTION INTRAVENOUS at 14:39

## 2022-10-28 RX ADMIN — FAMOTIDINE 20 MG: 10 INJECTION INTRAVENOUS at 14:23

## 2022-10-28 NOTE — PROGRESS NOTES
Pt tolerated premeds and venofer without incident  Instructed pt to reach out to her providers office if she has any issues like she did last time  Pt declined AVS, aware of future appts

## 2022-11-04 ENCOUNTER — HOSPITAL ENCOUNTER (OUTPATIENT)
Dept: INFUSION CENTER | Facility: CLINIC | Age: 62
End: 2022-11-04

## 2022-11-04 VITALS
HEART RATE: 92 BPM | SYSTOLIC BLOOD PRESSURE: 121 MMHG | RESPIRATION RATE: 18 BRPM | DIASTOLIC BLOOD PRESSURE: 76 MMHG | TEMPERATURE: 98.6 F

## 2022-11-04 DIAGNOSIS — D50.8 IRON DEFICIENCY ANEMIA FOLLOWING BARIATRIC SURGERY: ICD-10-CM

## 2022-11-04 DIAGNOSIS — E53.8 B12 DEFICIENCY: Primary | ICD-10-CM

## 2022-11-04 DIAGNOSIS — K95.89 IRON DEFICIENCY ANEMIA FOLLOWING BARIATRIC SURGERY: ICD-10-CM

## 2022-11-04 RX ORDER — SODIUM CHLORIDE 9 MG/ML
20 INJECTION, SOLUTION INTRAVENOUS ONCE
Status: COMPLETED | OUTPATIENT
Start: 2022-11-04 | End: 2022-11-04

## 2022-11-04 RX ORDER — SODIUM CHLORIDE 9 MG/ML
20 INJECTION, SOLUTION INTRAVENOUS ONCE
Status: CANCELLED | OUTPATIENT
Start: 2022-11-11

## 2022-11-04 RX ORDER — CYANOCOBALAMIN 1000 UG/ML
1000 INJECTION, SOLUTION INTRAMUSCULAR; SUBCUTANEOUS ONCE
Status: COMPLETED | OUTPATIENT
Start: 2022-11-04 | End: 2022-11-04

## 2022-11-04 RX ORDER — CYANOCOBALAMIN 1000 UG/ML
1000 INJECTION, SOLUTION INTRAMUSCULAR; SUBCUTANEOUS ONCE
Status: CANCELLED | OUTPATIENT
Start: 2022-11-11

## 2022-11-04 RX ADMIN — DEXAMETHASONE SODIUM PHOSPHATE 8 MG: 10 INJECTION, SOLUTION INTRAMUSCULAR; INTRAVENOUS at 08:55

## 2022-11-04 RX ADMIN — FAMOTIDINE 20 MG: 10 INJECTION INTRAVENOUS at 09:37

## 2022-11-04 RX ADMIN — SODIUM CHLORIDE 20 ML/HR: 0.9 INJECTION, SOLUTION INTRAVENOUS at 08:54

## 2022-11-04 RX ADMIN — SODIUM CHLORIDE 200 MG: 900 INJECTION, SOLUTION INTRAVENOUS at 09:54

## 2022-11-04 RX ADMIN — DIPHENHYDRAMINE HYDROCHLORIDE 25 MG: 50 INJECTION, SOLUTION INTRAMUSCULAR; INTRAVENOUS at 09:15

## 2022-11-04 RX ADMIN — CYANOCOBALAMIN 1000 MCG: 1000 INJECTION, SOLUTION INTRAMUSCULAR at 09:23

## 2022-11-04 NOTE — PROGRESS NOTES
Pt arrived to unit without complaint, tolerated Venofer infusion and B12 injection without any adverse reaction  Pt left unit in stable condition without question or concern, AVS provided

## 2022-11-11 ENCOUNTER — HOSPITAL ENCOUNTER (OUTPATIENT)
Dept: INFUSION CENTER | Facility: CLINIC | Age: 62
End: 2022-11-11

## 2022-11-11 VITALS
DIASTOLIC BLOOD PRESSURE: 85 MMHG | SYSTOLIC BLOOD PRESSURE: 152 MMHG | TEMPERATURE: 99.5 F | RESPIRATION RATE: 18 BRPM | HEART RATE: 103 BPM

## 2022-11-11 DIAGNOSIS — E53.8 B12 DEFICIENCY: Primary | ICD-10-CM

## 2022-11-11 DIAGNOSIS — D50.8 IRON DEFICIENCY ANEMIA FOLLOWING BARIATRIC SURGERY: ICD-10-CM

## 2022-11-11 DIAGNOSIS — K95.89 IRON DEFICIENCY ANEMIA FOLLOWING BARIATRIC SURGERY: ICD-10-CM

## 2022-11-11 RX ORDER — CYANOCOBALAMIN 1000 UG/ML
1000 INJECTION, SOLUTION INTRAMUSCULAR; SUBCUTANEOUS ONCE
Status: COMPLETED | OUTPATIENT
Start: 2022-11-11 | End: 2022-11-11

## 2022-11-11 RX ORDER — CYANOCOBALAMIN 1000 UG/ML
1000 INJECTION, SOLUTION INTRAMUSCULAR; SUBCUTANEOUS ONCE
Status: CANCELLED | OUTPATIENT
Start: 2022-11-11

## 2022-11-11 RX ORDER — SODIUM CHLORIDE 9 MG/ML
20 INJECTION, SOLUTION INTRAVENOUS ONCE
Status: COMPLETED | OUTPATIENT
Start: 2022-11-11 | End: 2022-11-11

## 2022-11-11 RX ORDER — SODIUM CHLORIDE 9 MG/ML
20 INJECTION, SOLUTION INTRAVENOUS ONCE
Status: CANCELLED | OUTPATIENT
Start: 2022-11-18

## 2022-11-11 RX ADMIN — FAMOTIDINE 20 MG: 10 INJECTION INTRAVENOUS at 10:07

## 2022-11-11 RX ADMIN — DIPHENHYDRAMINE HYDROCHLORIDE 25 MG: 50 INJECTION, SOLUTION INTRAMUSCULAR; INTRAVENOUS at 09:42

## 2022-11-11 RX ADMIN — SODIUM CHLORIDE 20 ML/HR: 0.9 INJECTION, SOLUTION INTRAVENOUS at 09:02

## 2022-11-11 RX ADMIN — DEXAMETHASONE SODIUM PHOSPHATE 8 MG: 10 INJECTION, SOLUTION INTRAMUSCULAR; INTRAVENOUS at 09:05

## 2022-11-11 RX ADMIN — CYANOCOBALAMIN 1000 MCG: 1000 INJECTION, SOLUTION INTRAMUSCULAR at 09:05

## 2022-11-11 RX ADMIN — SODIUM CHLORIDE 200 MG: 9 INJECTION, SOLUTION INTRAVENOUS at 10:27

## 2022-11-11 NOTE — PROGRESS NOTES
Pt arrived to unit without complaint  Pt tolerated Venofer infusion and B12 injection without incident  AVS declined, but aware of future appts  Pt left unit in stable condition

## 2022-11-18 ENCOUNTER — HOSPITAL ENCOUNTER (OUTPATIENT)
Dept: INFUSION CENTER | Facility: CLINIC | Age: 62
End: 2022-11-18

## 2022-11-18 VITALS
TEMPERATURE: 97.6 F | DIASTOLIC BLOOD PRESSURE: 77 MMHG | RESPIRATION RATE: 16 BRPM | HEART RATE: 92 BPM | SYSTOLIC BLOOD PRESSURE: 117 MMHG

## 2022-11-18 DIAGNOSIS — K95.89 IRON DEFICIENCY ANEMIA FOLLOWING BARIATRIC SURGERY: Primary | ICD-10-CM

## 2022-11-18 DIAGNOSIS — D50.8 IRON DEFICIENCY ANEMIA FOLLOWING BARIATRIC SURGERY: Primary | ICD-10-CM

## 2022-11-18 RX ORDER — SODIUM CHLORIDE 9 MG/ML
20 INJECTION, SOLUTION INTRAVENOUS ONCE
Status: COMPLETED | OUTPATIENT
Start: 2022-11-18 | End: 2022-11-18

## 2022-11-18 RX ORDER — SODIUM CHLORIDE 9 MG/ML
20 INJECTION, SOLUTION INTRAVENOUS ONCE
Status: CANCELLED | OUTPATIENT
Start: 2022-11-25

## 2022-11-18 RX ADMIN — SODIUM CHLORIDE 200 MG: 900 INJECTION, SOLUTION INTRAVENOUS at 10:56

## 2022-11-18 RX ADMIN — DEXAMETHASONE SODIUM PHOSPHATE 8 MG: 10 INJECTION, SOLUTION INTRAMUSCULAR; INTRAVENOUS at 09:48

## 2022-11-18 RX ADMIN — DIPHENHYDRAMINE HYDROCHLORIDE 25 MG: 50 INJECTION, SOLUTION INTRAMUSCULAR; INTRAVENOUS at 09:09

## 2022-11-18 RX ADMIN — SODIUM CHLORIDE 20 ML/HR: 0.9 INJECTION, SOLUTION INTRAVENOUS at 09:06

## 2022-11-18 RX ADMIN — FAMOTIDINE 20 MG: 10 INJECTION INTRAVENOUS at 10:29

## 2022-11-18 NOTE — PROGRESS NOTES
Patient denies current infection or being on antibiotics  Patient tolerated IV Venofer  AVS declined  Patient is aware of next appointment  Patient ambulated off unit without incident  All personal belongings taken with patient

## 2022-11-21 ENCOUNTER — DOCUMENTATION (OUTPATIENT)
Dept: CASE MANAGEMENT | Facility: OTHER | Age: 62
End: 2022-11-21

## 2022-11-21 NOTE — MEDICAL HIGH UTILIZER
Ricky 53 for: Jermain Olivarez  Patient Name: Jermain Olivarez          MRN: [id number]       : 1960 Age: 59      Sex:  F   Utilization Background: She is a female with a history of migraine, [place]’s disease, Bipolar, Depression, Fibromyalgia, and HTN who presents to [facility] and [facility] with migraine  She has 14 ER visits, 9 admission, and 2 transfers to [facility] for Ketamine Infusions in 2019  Discussed with Neurology reveals that patient does not feel much better even after prolonged hospitalization      In the last 90 days: 0 encounters     Treatment Recommendations:  ED Recommendations: Recommendations as per neurology: Give migraine protocol: using steroid protocol d/t toradol allergy  Recommend calling her [facility] Neurologist to schedule close outpatient follow up  It is noted that the patient will provide other complaints to the ER providers to get admitted and then will complain of migraine in the hospital    Would not offer transfer for Ketamine Infusion to this patient as it has not worked in the past  This should be left up to Neurology to determine if this is appropriate         Inpatient Recommendations: Early consultation with neurology  Avoid narcotics/sedating agents due to over sedation in the past         Outpatient recommendations: Needs close follow up with [facility]  Needs CM to make sure that patient does not run out of her meds as she has in the past     Situation: Patient who presents frequently for migraines  It seems that she has started using other chief complaints to get admitted to the hospital for migraine treatment   As per neurology colleague her headache symptomatology does not usually change with treatment        PMH/PSH:  Migraine, Depression, Bipolar, Fibromyalgia, HTN, Hx of DVT      Assessment                              Drivers of repeated utilization:                 Symptomatology      Community Resources in place:    None - she has mentioned that she does not have a  for infusions  May need assistance with transportation and with medications   Patient Care Team:   Tejas Fischer MD - PCP [facility])  Lakisha Bhatt, [facility] MOON Stevens/Luigi Bah MD - Neurology [facility])  OP Care Manager: Pricilla Caruso RN                  Care plan date and owner: Rachel Ndiaye PA-C 10/31/2019         Reviewed with patient before discharge?

## 2022-11-22 ENCOUNTER — TELEPHONE (OUTPATIENT)
Dept: PSYCHIATRY | Facility: CLINIC | Age: 62
End: 2022-11-22

## 2022-11-25 ENCOUNTER — HOSPITAL ENCOUNTER (OUTPATIENT)
Dept: INFUSION CENTER | Facility: CLINIC | Age: 62
End: 2022-11-25

## 2022-11-25 VITALS
SYSTOLIC BLOOD PRESSURE: 123 MMHG | HEART RATE: 97 BPM | RESPIRATION RATE: 16 BRPM | DIASTOLIC BLOOD PRESSURE: 76 MMHG | TEMPERATURE: 97.4 F

## 2022-11-25 DIAGNOSIS — D50.8 IRON DEFICIENCY ANEMIA FOLLOWING BARIATRIC SURGERY: Primary | ICD-10-CM

## 2022-11-25 DIAGNOSIS — K95.89 IRON DEFICIENCY ANEMIA FOLLOWING BARIATRIC SURGERY: Primary | ICD-10-CM

## 2022-11-25 RX ORDER — SODIUM CHLORIDE 9 MG/ML
20 INJECTION, SOLUTION INTRAVENOUS ONCE
Status: CANCELLED | OUTPATIENT
Start: 2022-11-25

## 2022-11-25 RX ORDER — SODIUM CHLORIDE 9 MG/ML
20 INJECTION, SOLUTION INTRAVENOUS ONCE
Status: COMPLETED | OUTPATIENT
Start: 2022-11-25 | End: 2022-11-25

## 2022-11-25 RX ADMIN — SODIUM CHLORIDE 20 ML/HR: 0.9 INJECTION, SOLUTION INTRAVENOUS at 09:40

## 2022-11-25 RX ADMIN — FAMOTIDINE 20 MG: 10 INJECTION INTRAVENOUS at 10:45

## 2022-11-25 RX ADMIN — DEXAMETHASONE SODIUM PHOSPHATE 8 MG: 10 INJECTION, SOLUTION INTRAMUSCULAR; INTRAVENOUS at 09:44

## 2022-11-25 RX ADMIN — SODIUM CHLORIDE 200 MG: 900 INJECTION, SOLUTION INTRAVENOUS at 11:14

## 2022-11-25 RX ADMIN — DIPHENHYDRAMINE HYDROCHLORIDE 25 MG: 50 INJECTION, SOLUTION INTRAMUSCULAR; INTRAVENOUS at 10:22

## 2022-11-25 NOTE — PROGRESS NOTES
Pt arrived to unit without complaint  Pt tolerated Venofer without incident  Appt card provided  Pt left unit in stable condition

## 2022-12-12 ENCOUNTER — HOSPITAL ENCOUNTER (OUTPATIENT)
Dept: INFUSION CENTER | Facility: CLINIC | Age: 62
Discharge: HOME | End: 2022-12-12

## 2022-12-12 DIAGNOSIS — E53.8 B12 DEFICIENCY: Primary | ICD-10-CM

## 2022-12-12 DIAGNOSIS — D50.8 IRON DEFICIENCY ANEMIA FOLLOWING BARIATRIC SURGERY: ICD-10-CM

## 2022-12-12 DIAGNOSIS — K95.89 IRON DEFICIENCY ANEMIA FOLLOWING BARIATRIC SURGERY: ICD-10-CM

## 2022-12-12 RX ORDER — CYANOCOBALAMIN 1000 UG/ML
1000 INJECTION, SOLUTION INTRAMUSCULAR; SUBCUTANEOUS ONCE
OUTPATIENT
Start: 2023-01-09

## 2022-12-12 RX ORDER — CYANOCOBALAMIN 1000 UG/ML
1000 INJECTION, SOLUTION INTRAMUSCULAR; SUBCUTANEOUS ONCE
Status: COMPLETED | OUTPATIENT
Start: 2022-12-12 | End: 2022-12-12

## 2022-12-12 RX ADMIN — CYANOCOBALAMIN 1000 MCG: 1000 INJECTION, SOLUTION INTRAMUSCULAR at 10:18

## 2022-12-21 DIAGNOSIS — K95.89 IRON DEFICIENCY ANEMIA FOLLOWING BARIATRIC SURGERY: Primary | ICD-10-CM

## 2022-12-21 DIAGNOSIS — D64.9 NORMOCYTIC ANEMIA: ICD-10-CM

## 2022-12-21 DIAGNOSIS — D72.829 LEUKOCYTOSIS, UNSPECIFIED TYPE: ICD-10-CM

## 2022-12-21 DIAGNOSIS — E53.8 B12 DEFICIENCY: ICD-10-CM

## 2022-12-21 DIAGNOSIS — D75.839 THROMBOCYTOSIS: ICD-10-CM

## 2022-12-21 DIAGNOSIS — D50.8 IRON DEFICIENCY ANEMIA FOLLOWING BARIATRIC SURGERY: Primary | ICD-10-CM

## 2023-01-05 NOTE — PROGRESS NOTES
Office Visit - 12[address] AMY Triplett MRN: [id number]  Encounter: [id number]    Assessment and Plan    Problem List Items Addressed This Visit        Musculoskeletal and Integument    Fracture of multiple ribs of right side - Primary     - patient is doing well at today's visit  - saturation is 95% on room air  - patient reports that she does get winded sometimes with ambulation however this is much better since she left the hospital  - she offers no new shortness of breath or chest pain at baseline  - she reports she is currently weaning off the oxycodone with Orthopedics  - informed her that no further prescription should be prescribed and that she should continue this self wean so that she is no longer on a narcotic  - she was agreeable to this plan and I echoed that Orthopedics with likely sure the same opinion with her; again she was agreeable with this  - no further workup from a trauma perspective  - no repeat imaging  - told to call back with questions or concerns             Disposition:  DC from Trauma service  No further workup at this time    Chief Complaint:  Chrissie Acosta is a 62 y o  female who presents for Follow-up and Fall    Subjective  Patient is doing well at today's visit  She reports that her majority of her complaints are in her bilateral hands and elbows secondary to pain  She reports that the pain is much improved  She reports that she just wants to start physical therapy so she can get her strength back  She offers no new numbness or tingling  She reports he continues to be ambulatory  She has no new cough or congestion  No new fevers, chills, sweats  No nausea or vomiting      Past Medical History  Past Medical History:   Diagnosis Date    Adrenal insufficiency (Andr[facility]'s disease) ([facility] [address] )     Bipolar disorder     C  difficile diarrhea     Cervical radiculopathy     Chronic back pain     [facility]    Fibromyalgia     History of TIAs     cannot remember details    Hypertension     Hypokalemia     Migraine     MRSA (methicillin resistant Staphylococcus aureus) 07/20/2012    nasal swab negative 4/5/19    Psychiatric disorder     Anxiety, major depression, bipolar    Spinal stenosis     Syncope 2014    orthostatic hypotension       Past Surgical History  Past Surgical History:   Procedure Laterality Date    APPENDECTOMY      CAST APPLICATION Left 8/4/1244    Procedure: Application short-arm splint;  Surgeon: Bee Morgan MD;  [facility];  Service: Orthopedics    GASTRIC RESTRICTION SURGERY      Gastric Sleeve Nov 2015    HYSTERECTOMY      JOINT REPLACEMENT      Left Knee 2011 and Right Hip 2010    ORIF WRIST FRACTURE Left 7/4/2020    Procedure: Open reduction and internal fixation left radius and ulnar shaft fracture;  Surgeon: Bee Morgan MD;  [facility];  Service: Orthopedics       Family History  Family History   Problem Relation Age of Onset    Breast cancer Mother     Migraines Mother     Arthritis Mother     Kidney disease Father     Heart disease Father     Diabetes Father     Arthritis Father     Brain cancer Brother     Seizures Brother        Medications  Current Outpatient Medications on File Prior to Visit   Medication Sig Dispense Refill    acetaminophen (TYLENOL) 500 mg tablet Take 1 tablet (500 mg total) by mouth every 6 (six) hours as needed (pain) 30 tablet 0    apixaban (ELIQUIS) 5 mg Take 5 mg by mouth 2 (two) times a day      ARIPiprazole (ABILIFY) 15 mg tablet Take 0 5 tablets (7 5 mg total) by mouth daily (Patient taking differently: Take 15 mg by mouth daily ) 30 tablet 0    cholecalciferol (VITAMIN D3) 1,000 units tablet Take 5,000 Units by mouth daily      Erenumab-aooe (AIMOVIG) 140 MG/ML SOAJ Inject 140 mg under the skin every 30 (thirty) days       ferrous sulfate 325 (65 Fe) mg tablet Take 325 mg by mouth daily with breakfast      fluvoxaMINE (LUVOX) 50 mg tablet Take 3 tablets (150 mg total) by mouth daily at bedtime (Patient taking differently: Take 300 mg by mouth daily at bedtime ) 90 tablet 0    gabapentin (NEURONTIN) 300 mg capsule Take 1 capsule (300 mg total) by mouth 3 (three) times a day 30 capsule 0    lamoTRIgine (LaMICtal) 200 MG tablet Take 200 mg by mouth daily   LORazepam (ATIVAN) 1 mg tablet Take 2 tablets (2 mg total) by mouth 2 (two) times a day for 10 days 10 tablet 0    meclizine (ANTIVERT) 12 5 MG tablet Take 1 tablet (12 5 mg total) by mouth every 8 (eight) hours as needed for dizziness for up to 7 days 21 tablet 0    mexiletine (MEXITIL) 150 mg capsule Take 150 mg by mouth 3 (three) times a day      ondansetron (ZOFRAN) 4 mg tablet Take 1 tablet (4 mg total) by mouth every 8 (eight) hours as needed for nausea or vomiting 60 tablet 2    oxyCODONE (ROXICODONE) 5 mg immediate release tablet Take 0 5-1 tab every 8 hours as needed for pain 15 tablet 0    zonisamide (ZONEGRAN) 100 mg capsule Take 200 mg by mouth daily       No current facility-administered medications on file prior to visit  Allergies  Allergies   Allergen Reactions    Ketorolac Itching and Other (See Comments)     [toradol] Itching, hives    Mushroom Extract Complex        Review of Systems   Constitutional: Negative for activity change, appetite change and fever  HENT: Negative for ear discharge, ear pain, rhinorrhea, sore throat and trouble swallowing  Eyes: Negative for photophobia, pain and redness  Respiratory: Negative for apnea, cough, chest tightness, shortness of breath and stridor  Cardiovascular: Negative for chest pain and palpitations  Gastrointestinal: Negative for abdominal distention, abdominal pain, nausea and vomiting  Endocrine: Negative for cold intolerance and heat intolerance  Genitourinary: Negative  Musculoskeletal: Negative for arthralgias, back pain, neck pain and neck stiffness  Skin: Negative      Neurological: Negative for dizziness, weakness, light-headedness and numbness  Hematological: Negative  Objective  Vitals:    07/30/20 1246   BP: 116/74   Pulse: (!) 106   Temp: 98 8 °F (37 1 °C)       Physical Exam   Constitutional: She is oriented to person, place, and time  She appears well-developed and well-nourished  HENT:   Head: Normocephalic and atraumatic  Eyes: Pupils are equal, round, and reactive to light  EOM are normal    Neck: Normal range of motion  Neck supple  Cardiovascular: Normal rate, regular rhythm, normal heart sounds and intact distal pulses  Pulmonary/Chest: Effort normal and breath sounds normal  No stridor  No respiratory distress  Abdominal: Soft  Bowel sounds are normal  She exhibits no distension  There is no tenderness  Musculoskeletal: Normal range of motion  She exhibits no edema or deformity  Currently in a splint for right upper extremity  Neurological: She is alert and oriented to person, place, and time  Skin: Skin is warm and dry  No erythema  Vitals reviewed  intractable nausea/vomiting, CKD5 nearing ESRD, cavitary PNA  intractable N/V , esophageal stent removal

## 2023-01-09 ENCOUNTER — APPOINTMENT (OUTPATIENT)
Dept: LAB | Facility: MEDICAL CENTER | Age: 63
End: 2023-01-09

## 2023-01-09 ENCOUNTER — HOSPITAL ENCOUNTER (OUTPATIENT)
Dept: INFUSION CENTER | Facility: CLINIC | Age: 63
Discharge: HOME | End: 2023-01-09

## 2023-01-09 DIAGNOSIS — E53.8 B12 DEFICIENCY: ICD-10-CM

## 2023-01-09 DIAGNOSIS — K95.89 IRON DEFICIENCY ANEMIA FOLLOWING BARIATRIC SURGERY: ICD-10-CM

## 2023-01-09 DIAGNOSIS — D50.8 IRON DEFICIENCY ANEMIA FOLLOWING BARIATRIC SURGERY: ICD-10-CM

## 2023-01-09 DIAGNOSIS — E53.8 B12 DEFICIENCY: Primary | ICD-10-CM

## 2023-01-09 DIAGNOSIS — D72.829 LEUKOCYTOSIS, UNSPECIFIED TYPE: ICD-10-CM

## 2023-01-09 DIAGNOSIS — D64.9 NORMOCYTIC ANEMIA: ICD-10-CM

## 2023-01-09 DIAGNOSIS — D75.839 THROMBOCYTOSIS: ICD-10-CM

## 2023-01-09 LAB
BASOPHILS # BLD AUTO: 0.08 THOUSANDS/ÂΜL (ref 0–0.1)
BASOPHILS NFR BLD AUTO: 1 % (ref 0–1)
EOSINOPHIL # BLD AUTO: 0.19 THOUSAND/ÂΜL (ref 0–0.61)
EOSINOPHIL NFR BLD AUTO: 2 % (ref 0–6)
ERYTHROCYTE [DISTWIDTH] IN BLOOD BY AUTOMATED COUNT: 17.7 % (ref 11.6–15.1)
FERRITIN SERPL-MCNC: 173 NG/ML (ref 8–388)
HCT VFR BLD AUTO: 39.1 % (ref 34.8–46.1)
HGB BLD-MCNC: 12.9 G/DL (ref 11.5–15.4)
IMM GRANULOCYTES # BLD AUTO: 0.09 THOUSAND/UL (ref 0–0.2)
IMM GRANULOCYTES NFR BLD AUTO: 1 % (ref 0–2)
IRON SATN MFR SERPL: 24 % (ref 15–50)
IRON SERPL-MCNC: 85 UG/DL (ref 50–170)
LYMPHOCYTES # BLD AUTO: 2.84 THOUSANDS/ÂΜL (ref 0.6–4.47)
LYMPHOCYTES NFR BLD AUTO: 23 % (ref 14–44)
MCH RBC QN AUTO: 29.9 PG (ref 26.8–34.3)
MCHC RBC AUTO-ENTMCNC: 33 G/DL (ref 31.4–37.4)
MCV RBC AUTO: 91 FL (ref 82–98)
MONOCYTES # BLD AUTO: 1.12 THOUSAND/ÂΜL (ref 0.17–1.22)
MONOCYTES NFR BLD AUTO: 9 % (ref 4–12)
NEUTROPHILS # BLD AUTO: 8.1 THOUSANDS/ÂΜL (ref 1.85–7.62)
NEUTS SEG NFR BLD AUTO: 64 % (ref 43–75)
NRBC BLD AUTO-RTO: 0 /100 WBCS
PLATELET # BLD AUTO: 421 THOUSANDS/UL (ref 149–390)
PMV BLD AUTO: 10.5 FL (ref 8.9–12.7)
RBC # BLD AUTO: 4.31 MILLION/UL (ref 3.81–5.12)
TIBC SERPL-MCNC: 348 UG/DL (ref 250–450)
WBC # BLD AUTO: 12.42 THOUSAND/UL (ref 4.31–10.16)

## 2023-01-09 RX ORDER — CYANOCOBALAMIN 1000 UG/ML
1000 INJECTION, SOLUTION INTRAMUSCULAR; SUBCUTANEOUS ONCE
Status: COMPLETED | OUTPATIENT
Start: 2023-01-09 | End: 2023-01-09

## 2023-01-09 RX ORDER — CYANOCOBALAMIN 1000 UG/ML
1000 INJECTION, SOLUTION INTRAMUSCULAR; SUBCUTANEOUS ONCE
OUTPATIENT
Start: 2023-02-06

## 2023-01-09 RX ADMIN — CYANOCOBALAMIN 1000 MCG: 1000 INJECTION, SOLUTION INTRAMUSCULAR at 10:42

## 2023-01-09 NOTE — PROGRESS NOTES
Pt  Denies new symptoms or concerns today  Vitamin B12 given in Rt deltoid  Pt tolerated well  Future appointment scheduled for one month as ordered  AVS provided

## 2023-01-20 ENCOUNTER — TELEPHONE (OUTPATIENT)
Dept: HEMATOLOGY ONCOLOGY | Facility: CLINIC | Age: 63
End: 2023-01-20

## 2023-01-20 DIAGNOSIS — D50.8 IRON DEFICIENCY ANEMIA FOLLOWING BARIATRIC SURGERY: ICD-10-CM

## 2023-01-20 DIAGNOSIS — D64.9 NORMOCYTIC ANEMIA: ICD-10-CM

## 2023-01-20 DIAGNOSIS — I82.491 ACUTE DEEP VEIN THROMBOSIS (DVT) OF OTHER SPECIFIED VEIN OF RIGHT LOWER EXTREMITY (HCC): ICD-10-CM

## 2023-01-20 DIAGNOSIS — Z79.01 CHRONIC ANTICOAGULATION: ICD-10-CM

## 2023-01-20 DIAGNOSIS — D72.829 LEUKOCYTOSIS, UNSPECIFIED TYPE: ICD-10-CM

## 2023-01-20 DIAGNOSIS — D75.839 THROMBOCYTOSIS: ICD-10-CM

## 2023-01-20 DIAGNOSIS — E53.8 B12 DEFICIENCY: ICD-10-CM

## 2023-01-20 DIAGNOSIS — K95.89 IRON DEFICIENCY ANEMIA FOLLOWING BARIATRIC SURGERY: ICD-10-CM

## 2023-01-20 DIAGNOSIS — I26.99 PE (PULMONARY THROMBOEMBOLISM) (HCC): ICD-10-CM

## 2023-01-20 NOTE — TELEPHONE ENCOUNTER
Patient has been rescheduled to 2/20/2023 @ 3:30 PM for follow up with MOON Rasmussen with labs 1 week prior  LVM advising patient  [facility] number provided for possible call back

## 2023-01-26 ENCOUNTER — OFFICE VISIT (OUTPATIENT)
Dept: OBGYN CLINIC | Facility: OTHER | Age: 63
End: 2023-01-26

## 2023-01-26 VITALS
WEIGHT: 253 LBS | BODY MASS INDEX: 38.34 KG/M2 | DIASTOLIC BLOOD PRESSURE: 79 MMHG | HEIGHT: 68 IN | SYSTOLIC BLOOD PRESSURE: 159 MMHG | HEART RATE: 79 BPM

## 2023-01-26 DIAGNOSIS — M19.011 OSTEOARTHRITIS OF RIGHT GLENOHUMERAL JOINT: Primary | ICD-10-CM

## 2023-01-26 PROBLEM — Z47.89 AFTERCARE FOLLOWING SURGERY OF THE MUSCULOSKELETAL SYSTEM, NEC: Status: RESOLVED | Noted: 2021-04-06 | Resolved: 2023-01-26

## 2023-01-26 PROBLEM — S46.812A FULL THICKNESS TEAR OF LEFT SUBSCAPULARIS TENDON: Status: RESOLVED | Noted: 2021-11-15 | Resolved: 2023-01-26

## 2023-01-26 RX ORDER — SYRINGE WITH NEEDLE, 1 ML 25GX5/8"
SYRINGE, EMPTY DISPOSABLE MISCELLANEOUS
COMMUNITY
Start: 2022-11-04

## 2023-01-26 RX ORDER — DIHYDROERGOTAMINE MESYLATE 4 MG/ML
SPRAY, METERED NASAL
COMMUNITY
Start: 2023-01-19

## 2023-01-26 RX ORDER — DIHYDROERGOTAMINE MESYLATE 1 MG/ML
INJECTION, SOLUTION INTRAMUSCULAR; INTRAVENOUS; SUBCUTANEOUS
COMMUNITY
Start: 2022-12-24

## 2023-01-26 RX ORDER — SYRINGE AND NEEDLE,INSULIN,1ML 25GX1"
SYRINGE, EMPTY DISPOSABLE MISCELLANEOUS
COMMUNITY
Start: 2023-01-18

## 2023-01-26 RX ORDER — BETAMETHASONE SODIUM PHOSPHATE AND BETAMETHASONE ACETATE 3; 3 MG/ML; MG/ML
6 INJECTION, SUSPENSION INTRA-ARTICULAR; INTRALESIONAL; INTRAMUSCULAR; SOFT TISSUE
Status: COMPLETED | OUTPATIENT
Start: 2023-01-26 | End: 2023-01-26

## 2023-01-26 RX ORDER — ATORVASTATIN CALCIUM 40 MG/1
TABLET, FILM COATED ORAL
COMMUNITY
Start: 2022-11-03

## 2023-01-26 RX ORDER — VITAMIN B COMPLEX
1000 TABLET ORAL DAILY
COMMUNITY
Start: 2022-10-28

## 2023-01-26 RX ORDER — AMLODIPINE BESYLATE 5 MG/1
5 TABLET ORAL DAILY
COMMUNITY
Start: 2023-01-12

## 2023-01-26 RX ORDER — UBROGEPANT 100 MG/1
TABLET ORAL
COMMUNITY
Start: 2022-12-08

## 2023-01-26 RX ORDER — SYRINGE WITH NEEDLE, 1 ML 25GX5/8"
SYRINGE, EMPTY DISPOSABLE MISCELLANEOUS
COMMUNITY
Start: 2022-12-08

## 2023-01-26 RX ORDER — BUPIVACAINE HYDROCHLORIDE 2.5 MG/ML
2 INJECTION, SOLUTION INFILTRATION; PERINEURAL
Status: COMPLETED | OUTPATIENT
Start: 2023-01-26 | End: 2023-01-26

## 2023-01-26 RX ORDER — ONDANSETRON 4 MG/1
TABLET, ORALLY DISINTEGRATING ORAL
COMMUNITY
Start: 2023-01-05

## 2023-01-26 RX ADMIN — BETAMETHASONE SODIUM PHOSPHATE AND BETAMETHASONE ACETATE 6 MG: 3; 3 INJECTION, SUSPENSION INTRA-ARTICULAR; INTRALESIONAL; INTRAMUSCULAR; SOFT TISSUE at 11:40

## 2023-01-26 RX ADMIN — BUPIVACAINE HYDROCHLORIDE 2 ML: 2.5 INJECTION, SOLUTION INFILTRATION; PERINEURAL at 11:40

## 2023-01-26 NOTE — PROGRESS NOTES
Assessment  Diagnoses and all orders for this visit:    Osteoarthritis of right glenohumeral joint      Discussion and Plan:    Nisha Nettles has symptomatic right glenohumeral arthritis  She was offered a steroid injection today for pain relief  She may resume activities to tolerance in 36-48 hours  Nisha Nettles wants to make an appointment for 4 months for a reevaluation  She wants to consider moving forward with shoulder replacement but is hesitant because she is right hand dominant  We will discuss treatment options at next visit and proceed with repeat injection vs scheduling shoulder replacement  Follow up - 4 months    Subjective:   Patient ID: Frieda Alanis is a 57 y o  female      Kaycee Seymour presents to the office in follow up of the right shoulder  She was last seen for her glenohumeral osteoarthritis about 10 months ago  She was provided a steroid injection for pain relief  She reports relief until November with the injection  Currently Nisha Nettles states her right shoulder pain is worse than her left shoulder ever was prior to surgery  Admits to painful popping of the shoulder  Denies new injury or trauma  Pain occurs with any motion or lifting  Pain improves with rest   Admits to pain that interferes with sleep  The following portions of the patient's history were reviewed and updated as appropriate: allergies, current medications, past family history, past medical history, past social history, past surgical history and problem list     Review of Systems   Constitutional: Negative for chills and fever  HENT: Negative for hearing loss  Eyes: Negative for visual disturbance  Respiratory: Negative for shortness of breath  Cardiovascular: Negative for chest pain  Gastrointestinal: Negative for abdominal pain  Musculoskeletal:        As reviewed in the HPI   Skin: Negative for rash  Neurological:        As reviewed in the HPI   Psychiatric/Behavioral: Negative for agitation  Objective:  /79 (BP Location: Left arm, Patient Position: Sitting, Cuff Size: Adult)   Pulse 79   Ht 5' 8" (1 727 m)   Wt 115 kg (253 lb)   BMI 38 47 kg/m²       Right Shoulder Exam     Tenderness   The patient is experiencing no tenderness  Range of Motion   External rotation: 30   Forward flexion: 160     Muscle Strength   External rotation: 5/5     Tests   Impingement: negative    Other   Erythema: absent  Sensation: normal  Pulse: present              Physical Exam  Constitutional:       Appearance: She is well-developed  HENT:      Head: Normocephalic  Pulmonary:      Breath sounds: Normal breath sounds  No wheezing  Musculoskeletal:      Cervical back: Normal range of motion  Skin:     General: Skin is warm and dry  Neurological:      Mental Status: She is alert and oriented to person, place, and time  Psychiatric:         Behavior: Behavior normal          Thought Content: Thought content normal          Judgment: Judgment normal          Large joint arthrocentesis: R glenohumeral  [place] Protocol:  Consent: Verbal consent obtained    Consent given by: patient    Supporting Documentation  Indications: pain   Procedure Details  Location: shoulder - R glenohumeral  Preparation: Patient was prepped and draped in the usual sterile fashion  Needle size: 22 G  Ultrasound guidance: no  Approach: posterior  Medications administered: 6 mg betamethasone acetate-betamethasone sodium phosphate 6 (3-3) mg/mL; 2 mL bupivacaine 0 25 %    Patient tolerance: patient tolerated the procedure well with no immediate complications  Dressing:  Sterile dressing applied

## 2023-01-31 ENCOUNTER — OFFICE VISIT (OUTPATIENT)
Dept: OBGYN CLINIC | Facility: OTHER | Age: 63
End: 2023-01-31

## 2023-01-31 ENCOUNTER — APPOINTMENT (OUTPATIENT)
Dept: RADIOLOGY | Facility: OTHER | Age: 63
End: 2023-01-31

## 2023-01-31 VITALS
SYSTOLIC BLOOD PRESSURE: 139 MMHG | HEIGHT: 68 IN | HEART RATE: 93 BPM | DIASTOLIC BLOOD PRESSURE: 82 MMHG | BODY MASS INDEX: 36.68 KG/M2 | WEIGHT: 242 LBS

## 2023-01-31 DIAGNOSIS — Z96.652 HISTORY OF LEFT KNEE REPLACEMENT: ICD-10-CM

## 2023-01-31 DIAGNOSIS — M25.562 ACUTE PAIN OF BOTH KNEES: ICD-10-CM

## 2023-01-31 DIAGNOSIS — M17.11 PRIMARY OSTEOARTHRITIS OF RIGHT KNEE: Primary | ICD-10-CM

## 2023-01-31 DIAGNOSIS — M25.561 ACUTE PAIN OF BOTH KNEES: ICD-10-CM

## 2023-01-31 RX ORDER — ATORVASTATIN CALCIUM 20 MG/1
TABLET, FILM COATED ORAL
COMMUNITY
Start: 2022-09-23

## 2023-01-31 RX ORDER — ONDANSETRON 4 MG/1
TABLET, ORALLY DISINTEGRATING ORAL
COMMUNITY
Start: 2022-08-08

## 2023-01-31 RX ORDER — BUPIVACAINE HYDROCHLORIDE 5 MG/ML
3.5 INJECTION, SOLUTION PERINEURAL
Status: COMPLETED | OUTPATIENT
Start: 2023-01-31 | End: 2023-01-31

## 2023-01-31 RX ORDER — MELATONIN
COMMUNITY
Start: 2022-09-16

## 2023-01-31 RX ORDER — TRIAMCINOLONE ACETONIDE 40 MG/ML
40 INJECTION, SUSPENSION INTRA-ARTICULAR; INTRAMUSCULAR
Status: COMPLETED | OUTPATIENT
Start: 2023-01-31 | End: 2023-01-31

## 2023-01-31 RX ADMIN — BUPIVACAINE HYDROCHLORIDE 3.5 ML: 5 INJECTION, SOLUTION PERINEURAL at 11:46

## 2023-01-31 RX ADMIN — TRIAMCINOLONE ACETONIDE 40 MG: 40 INJECTION, SUSPENSION INTRA-ARTICULAR; INTRAMUSCULAR at 11:46

## 2023-01-31 NOTE — PROGRESS NOTES
1  Primary osteoarthritis of right knee        2  Acute pain of both knees  CANCELED: XR knee 4+ vw left injury    CANCELED: XR knee 4+ vw right injury      3  History of left knee replacement  Ambulatory Referral to Orthopedic Surgery        Orders Placed This Encounter   Procedures   • Large joint arthrocentesis   • Ambulatory Referral to Orthopedic Surgery        Imaging Studies (I personally reviewed images in PACS and report):  Xray bilateral knee 1/31/23:  Left knee TKR appears stable  Right Knee moderate tricompartmental OA      IMPRESSION  Right Knee Moderate Tricompartmental OA  Left Knee pain s/p TKR 2013      Repeat X-ray next visit: None    Return for Follow-up with Surgeon  Patient Instructions   Trial right knee corticosteroid injection  Will have see joint replacement specialist for left knee TKR with pan possible pes anserine bursitis          CHIEF COMPLAINT:  Bilateral knee pain     HPI:  Shabbir Burnham is a 57 y o  female  who presents for new evaluation and treatment of bilateral knee pain       Visit 1/31/2023 :  C/o bilateral knee pain  atraumatic    Left Knee TKR 2013 OAA  Desires eval [facility]  Feels unstable  Right knee pain diffuse anterior moderate  Denies swelling             Review of Systems      Following history reviewed and update:    Past Medical History:   Diagnosis Date   • Adrenal insufficiency ([place]'s disease) ([address] )    • Anxiety    • Arthritis    • Bipolar disorder    • Cervical radiculopathy    • Chronic back pain    • Chronic pain disorder     lumbar   • Depression    • DVT, lower extremity ([address] )     1991 and 2019 now on eliquis   • Exercises 5 to 6 times per week     5 days per week with weights/band and also goes to PT 2x/week   • Fibromyalgia    • History of Clostridioides difficile infection    • History of MRSA infection     pt denies having this   • History of recent fall 07/2021    "possibly injured left shoulder'   • History of TIAs     cannot remember details   • Hypertension    • Hypokalemia    • Left shoulder pain     "not too bad" goes to PT 2x/week   • Migraine    • Psychiatric disorder     Anxiety, major depression, bipolar   • Spinal stenosis    • Stroke [facility])     pt reports" not remembering having a stroke"   • Syncope 2014    orthostatic hypotension   • Wears dentures     upper   • Wears glasses      Past Surgical History:   Procedure Laterality Date   • APPENDECTOMY     • CAST APPLICATION Left 8/3/9462    Procedure: Application short-arm splint;  Surgeon: Sunni Berry MD;  [facility];  Service: Orthopedics   • COLONOSCOPY     • FL INJECTION LEFT SHOULDER (ARTHROGRAM)  11/10/2021   • GASTRIC RESTRICTION SURGERY      Gastric Sleeve Nov 2015   • HYSTERECTOMY      DONALD   • JOINT REPLACEMENT      Left Knee 2011 and Right Hip 2010   • ORIF WRIST FRACTURE Left 7/4/2020    Procedure: Open reduction and internal fixation left radius and ulnar shaft fracture;  Surgeon: Sunni Berry MD;  [facility];  Service: Orthopedics   • AL ARTHROPLASTY GLENOHUMERAL JOINT TOTAL SHOULDER Left 3/30/2021    Procedure: ARTHROPLASTY SHOULDER - anatomic;  Surgeon: Karie Mercado MD;  [facility];  Service: Orthopedics   • AL [place] SHOULDER [address] HUMERAL&GLENOID COMPNT Left 12/21/2021    Procedure: REVISION OF TOTAL SHOULDER ARTHROPLASTY TO REVERSE TOTAL SHOULDER ARTHROPLASTY;  Surgeon: Ana Payne MD;  [facility];  Service: Orthopedics     Social History   Social History     Substance and Sexual Activity   Alcohol Use Not Currently   • Alcohol/week: 2 0 standard drinks   • Types: 1 Glasses of wine, 1 Standard drinks or equivalent per week    Comment: occasionally     Social History     Substance and Sexual Activity   Drug Use Never    Comment: na     Social History     Tobacco Use   Smoking Status Former   • Packs/day: 0 20   • Years: 20 00   • Pack years: 4 00   • Types: Cigarettes   • Start date: 1998   • Quit date: 2008   • Years since quitting: 15 0   Smokeless Tobacco Never   Tobacco Comments    quit     Family History   Problem Relation Age of Onset   • Breast cancer Mother    • Migraines Mother    • Arthritis Mother    • Kidney disease Father    • Heart disease Father    • Diabetes Father    • Arthritis Father    • Brain cancer Brother    • Seizures Brother      Allergies   Allergen Reactions   • Ketorolac Itching and Other (See Comments)     [toradol] Itching, hives   • Mushroom Extract Complex - Food Allergy Hives          Physical Exam  /82 (BP Location: Right arm, Patient Position: Sitting, Cuff Size: Adult)   Pulse 93   Ht 5' 8" (1 727 m)   Wt 110 kg (242 lb)   BMI 36 80 kg/m²     Constitutional:  see vital signs  Gen: well-developed, normocephalic/atraumatic, well-groomed  Eyes: No inflammation or discharge of conjunctiva or lids; sclera clear   Pharynx: no inflammation, lesion, or mass of lips  Neck: supple, no masses, non-distended  MSK: no inflammation, lesion, mass, or clubbing of nails and digits except for other than mentioned below  SKIN: no visible rashes or skin lesions  Pulmonary/Chest: Effort normal  No respiratory distress  NEURO: cranial nerves grossly intact  PSYCH:  Alert and oriented to person, place, and time; recent and remote memory intact; mood normal, no depression, anxiety, or agitation, judgment and insight good and intact     Ortho Exam  RIGHT KNEE:  Erythema: no  Swelling: no  Increased Warmth: no  Tenderness: +mjl and +LJL  Flexion: to 90 seated  Extension: intact  Patellar Displacement:  Patellar Tilt:  Patellar Apprehension: negative  Patellar Grind Driscoll's:+  Drawer: negative  Varus laxity: negative  Valgus laxity: negative  Gabrielle: negative             Large joint arthrocentesis: R knee  Universal Protocol:  Consent: Verbal consent obtained    Risks and benefits: risks, benefits and alternatives were discussed  Consent given by: patient  Time out: Immediately prior to procedure a "time out" was called to verify the correct patient, procedure, equipment, support staff and site/side marked as required    Patient understanding: patient states understanding of the procedure being performed  Site marked: the operative site was marked  Patient identity confirmed: verbally with patient    Supporting Documentation  Indications: pain and diagnostic evaluation   Procedure Details  Location: knee - R knee  Preparation: Patient was prepped and draped in the usual sterile fashion  Needle size: 22 G  Ultrasound guidance: no  Approach: anterolateral  Medications administered: 40 mg triamcinolone acetonide 40 mg/mL; 3 5 mL bupivacaine 0 5 %    Patient tolerance: patient tolerated the procedure well with no immediate complications  Dressing:  Sterile dressing applied    4ml anesthetic injectate

## 2023-01-31 NOTE — PATIENT INSTRUCTIONS
Trial right knee corticosteroid injection  Will have see joint replacement specialist for left knee TKR with pan possible pes anserine bursitis

## 2023-02-08 ENCOUNTER — HOSPITAL ENCOUNTER (OUTPATIENT)
Dept: INFUSION CENTER | Facility: CLINIC | Age: 63
Discharge: HOME | End: 2023-02-08

## 2023-02-08 DIAGNOSIS — E53.8 B12 DEFICIENCY: Primary | ICD-10-CM

## 2023-02-08 DIAGNOSIS — K95.89 IRON DEFICIENCY ANEMIA FOLLOWING BARIATRIC SURGERY: ICD-10-CM

## 2023-02-08 DIAGNOSIS — D50.8 IRON DEFICIENCY ANEMIA FOLLOWING BARIATRIC SURGERY: ICD-10-CM

## 2023-02-08 RX ORDER — CYANOCOBALAMIN 1000 UG/ML
1000 INJECTION, SOLUTION INTRAMUSCULAR; SUBCUTANEOUS ONCE
OUTPATIENT
Start: 2023-03-06

## 2023-02-08 RX ORDER — CYANOCOBALAMIN 1000 UG/ML
1000 INJECTION, SOLUTION INTRAMUSCULAR; SUBCUTANEOUS ONCE
Status: COMPLETED | OUTPATIENT
Start: 2023-02-08 | End: 2023-02-08

## 2023-02-08 RX ADMIN — CYANOCOBALAMIN 1000 MCG: 1000 INJECTION, SOLUTION INTRAMUSCULAR at 10:33

## 2023-02-15 ENCOUNTER — TELEPHONE (OUTPATIENT)
Dept: HEMATOLOGY ONCOLOGY | Facility: CLINIC | Age: 63
End: 2023-02-15

## 2023-02-17 ENCOUNTER — APPOINTMENT (OUTPATIENT)
Dept: LAB | Facility: MEDICAL CENTER | Age: 63
End: 2023-02-17

## 2023-02-17 DIAGNOSIS — D75.839 THROMBOCYTOSIS: ICD-10-CM

## 2023-02-17 DIAGNOSIS — E53.8 B12 DEFICIENCY: ICD-10-CM

## 2023-02-17 DIAGNOSIS — D72.829 LEUKOCYTOSIS, UNSPECIFIED TYPE: ICD-10-CM

## 2023-02-17 DIAGNOSIS — D50.8 IRON DEFICIENCY ANEMIA FOLLOWING BARIATRIC SURGERY: ICD-10-CM

## 2023-02-17 DIAGNOSIS — D64.9 NORMOCYTIC ANEMIA: ICD-10-CM

## 2023-02-17 DIAGNOSIS — I82.491 ACUTE DEEP VEIN THROMBOSIS (DVT) OF OTHER SPECIFIED VEIN OF RIGHT LOWER EXTREMITY (HCC): ICD-10-CM

## 2023-02-17 DIAGNOSIS — K95.89 IRON DEFICIENCY ANEMIA FOLLOWING BARIATRIC SURGERY: ICD-10-CM

## 2023-02-17 DIAGNOSIS — I26.99 PE (PULMONARY THROMBOEMBOLISM) (HCC): ICD-10-CM

## 2023-02-17 DIAGNOSIS — Z79.01 CHRONIC ANTICOAGULATION: ICD-10-CM

## 2023-02-17 LAB
ERYTHROCYTE [DISTWIDTH] IN BLOOD BY AUTOMATED COUNT: 14.6 % (ref 11.6–15.1)
HCT VFR BLD AUTO: 38.9 % (ref 34.8–46.1)
HGB BLD-MCNC: 13 G/DL (ref 11.5–15.4)
IMM EOSINOPHIL NFR BLD MANUAL: 2 % (ref 0–6)
LYMPHOCYTES NFR BLD: 17 % (ref 14–44)
MCH RBC QN AUTO: 30.5 PG (ref 26.8–34.3)
MCHC RBC AUTO-ENTMCNC: 33.4 G/DL (ref 31.4–37.4)
MCV RBC AUTO: 91 FL (ref 82–98)
MONOCYTES NFR BLD AUTO: 7 % (ref 4–12)
NEUTS BAND NFR BLD MANUAL: 4 THOUSAND/UL
NEUTS SEG NFR BLD AUTO: 70 % (ref 45–77)
NRBC BLD AUTO-RTO: 0 /100 WBCS
PLATELET # BLD AUTO: 434 THOUSANDS/UL (ref 149–390)
PLATELET BLD QL SMEAR: ABNORMAL
PMV BLD AUTO: 9.2 FL (ref 8.9–12.7)
RBC # BLD AUTO: 4.26 MILLION/UL (ref 3.81–5.12)
RBC MORPH BLD: NORMAL
TOTAL CELLS COUNTED SPEC: 100
WBC # BLD AUTO: 14.45 THOUSAND/UL (ref 4.31–10.16)

## 2023-02-18 LAB
FERRITIN SERPL-MCNC: 155 NG/ML (ref 8–388)
IRON SATN MFR SERPL: 31 % (ref 15–50)
IRON SERPL-MCNC: 126 UG/DL (ref 50–170)
TIBC SERPL-MCNC: 401 UG/DL (ref 250–450)
VIT B12 SERPL-MCNC: 624 PG/ML (ref 100–900)

## 2023-02-20 ENCOUNTER — OFFICE VISIT (OUTPATIENT)
Dept: HEMATOLOGY ONCOLOGY | Facility: CLINIC | Age: 63
End: 2023-02-20

## 2023-02-20 ENCOUNTER — DOCUMENTATION (OUTPATIENT)
Dept: CASE MANAGEMENT | Facility: OTHER | Age: 63
End: 2023-02-20

## 2023-02-20 VITALS
HEIGHT: 68 IN | HEART RATE: 102 BPM | BODY MASS INDEX: 36.8 KG/M2 | RESPIRATION RATE: 16 BRPM | OXYGEN SATURATION: 98 % | SYSTOLIC BLOOD PRESSURE: 120 MMHG | TEMPERATURE: 96 F | DIASTOLIC BLOOD PRESSURE: 84 MMHG

## 2023-02-20 DIAGNOSIS — I82.491 ACUTE DEEP VEIN THROMBOSIS (DVT) OF OTHER SPECIFIED VEIN OF RIGHT LOWER EXTREMITY (HCC): ICD-10-CM

## 2023-02-20 DIAGNOSIS — E53.8 B12 DEFICIENCY: ICD-10-CM

## 2023-02-20 DIAGNOSIS — K95.89 IRON DEFICIENCY ANEMIA FOLLOWING BARIATRIC SURGERY: ICD-10-CM

## 2023-02-20 DIAGNOSIS — D50.8 IRON DEFICIENCY ANEMIA FOLLOWING BARIATRIC SURGERY: ICD-10-CM

## 2023-02-20 DIAGNOSIS — D64.9 NORMOCYTIC ANEMIA: Primary | ICD-10-CM

## 2023-02-20 DIAGNOSIS — Z79.01 CHRONIC ANTICOAGULATION: ICD-10-CM

## 2023-02-20 DIAGNOSIS — D75.839 THROMBOCYTOSIS: ICD-10-CM

## 2023-02-20 DIAGNOSIS — D72.829 LEUKOCYTOSIS, UNSPECIFIED TYPE: ICD-10-CM

## 2023-02-20 NOTE — PROGRESS NOTES
[address] [facility] [address]  Hematology Ambulatory Follow-Up  Cady Puente, 1960, [id number]  2/20/2023    Assessment/Plan:  1  Normocytic anemia  2  Iron deficiency anemia following bariatric surgery  3  B12 deficiency  Ms Anne Alonzo is a 68-year-old female seen in follow-up for longstanding history of iron deficiency anemia and B12 deficiency  She received 6 doses of IV Venofer which have improved her symptoms drastically  She states that she is still not back to baseline but is improving every day  She is still receiving B12 injections monthly  MMA was pending today  Other labs show that she has improved her ferritin from 10 to now 155, and B12 has also improved from 322 now 624  We will continue to monitor labs every 3 months prior to follow-up with me in the office  She knows to call the office if her symptoms worsen or return prior to this and we can repeat blood work sooner  4  Leukocytosis, unspecified type  5  Thrombocytosis  If these remain elevated with subsequent labs will pursue further work-up  At this time she is still receiving supplementation and could be reactive  She has no B symptoms  Peripheral smear is within normal limits  - CBC and differential; Future  - Comprehensive metabolic panel; Future  - Folate; Future  - Vitamin B12; Future  - Methylmalonic acid, serum; Future  - Iron Panel (Includes Ferritin, Iron Sat%, Iron, and TIBC); Future    6  Chronic anticoagulation  7  Acute deep vein thrombosis (DVT) of other specified vein of right lower extremity ([address] )  She has a history of DVT with PE and on lifelong anticoagulation with Xarelto  She tolerates this well without any excess bleeding or bruising  We will continue with indefinite Xarelto  The patient is scheduled for follow-up in approximately 6 months  Patient voiced agreement and understanding to the above   Patient knows to call the Hematology/Oncology office with any questions and concerns regarding the above  Barrier(s) to care: None  The patient is able to self care   ------------------------------------------------------------------------------------------------------    Chief Complaint   Patient presents with   • Follow-up       History of present illness:   Chelsey Shannon is a 58-year-old female seen in follow-up for iron deficiency and B12 deficiency anemia  She was originally seen in consultation in October 2022 after she transitioned her care from [facility]  She states that she had a DVT and PE in 2018 and was treated with Eliquis and recently switched to Xarelto due to insurance coverage indefinitely  She is tolerating this well without any excess bleeding or bruising  She has longstanding history of iron deficiency due to gastric bypass, gastric sleeve in 2018  She is unable to tolerate oral iron due to constipation  11/30/2018:  Hemoglobin 11 3, MCV 79, platelets 215, WBC 8 7  04/11/2019:  Hemoglobin 8 7, MCV 82, platelets 418, WBC 1 75              Ferritin 12, serum iron 27, TIBC 396  12/06/2021:  Hemoglobin 10 9, MCV 87, platelets 664, WBC 81 24              Ferritin 15, serum iron 30, TIBC 501  09/15/2022:  Hemoglobin 12 1, MCV 82, platelets 595, WBC 96 76              Ferritin 9, serum iron 36, TIBC 544  10/11/2022: Hemoglobin 11, MCV 84, platelets 985, WBC 02 57   Ferritin 10, iron saturation 5%, TIBC 495, serum iron 27   B12 320, , folate 15 1  Venofer 200 mg x6 (pepcid, benadryl, and decadron): 10/21-11/25 2/17/2023: Hemoglobin 13, MCV 91, platelets 777, WBC 90 80   Ferritin 155, iron saturation 31%, TIBC 4 1, serum iron 126   B12 624, MMA pending    Interval history: After her first transfusion she had a episode of nausea with severe headaches after this we added Pepcid, Benadryl, and Decadron to her infusions and she tolerated them well without any side effects    She is also tolerating B12 injections and is feeling good  She states that she is little not back to her baseline but is much improved from previous  Review of Systems   Constitutional: Positive for fatigue (improving)  Negative for activity change, appetite change, diaphoresis, fever and unexpected weight change  HENT: Negative for trouble swallowing and voice change  Eyes: Negative for photophobia and visual disturbance  Respiratory: Negative for cough, chest tightness and shortness of breath (+[place])  Cardiovascular: Negative for chest pain, palpitations and leg swelling  Gastrointestinal: Negative for abdominal distention, abdominal pain, anal bleeding, blood in stool, constipation, diarrhea, nausea and vomiting  Endocrine: Negative for cold intolerance and heat intolerance  Genitourinary: Negative for dysuria, hematuria, urgency and vaginal bleeding  Musculoskeletal: Negative for back pain, gait problem and joint swelling  Skin: Negative for pallor and rash  Neurological: Negative for dizziness, weakness, light-headedness and headaches  Hematological: Negative for adenopathy  Bruises/bleeds easily  Psychiatric/Behavioral: Negative for confusion and sleep disturbance         Patient Active Problem List   Diagnosis   • Bipolar depression ([facility] )   • Hypokalemia   • Vertigo   • Fibromyalgia   • Spinal stenosis of lumbar region   • Osteoarthritis of lumbosacral spine without myelopathy   • Migraine   • HLD (hyperlipidemia)   • Syncope   • Orthostatic hypotension   • History of TIAs   • History of migraine   • Neck pain   • Low back pain   • PE (pulmonary thromboembolism) (HCC)   • Hypertension   • Bipolar disorder   • Chronic back pain   • DVT, lower extremity (HCC)   • Anxiety   • Leukocytosis   • Chronic anticoagulation   • Anemia   • Ambulatory dysfunction   • Edema   • Right arm pain   • Dizziness   • Intractable migraine without aura and without status migrainosus   • Closed head injury with brief loss of consciousness ([facility] )   • Shoulder pain   • Class 2 obesity due to excess calories with body mass index (BMI) of 35 0 to 35 9 in adult   • Closed fracture of distal ends of left radius and ulna   • Fall down stairs   • Fracture of multiple ribs of right side   • Hematoma of parietal scalp   • Bilateral pulmonary contusion   • History of anxiety   • History of pulmonary embolism   • Right distal ulnar fracture   • Primary osteoarthritis of left shoulder   • Status post total replacement of left shoulder   • Contusion of right hand   • Alkalosis   • Osteoarthritis of right glenohumeral joint   • S/P reverse total shoulder arthroplasty, left   • Aftercare following left shoulder joint replacement surgery   • Iron deficiency anemia following bariatric surgery   • B12 deficiency       Past Medical History:   Diagnosis Date   • Adrenal insufficiency (T[place]'s disease) ([facility] )    • Anxiety    • Arthritis    • Bipolar disorder    • Cervical radiculopathy    • Chronic back pain    • Chronic pain disorder     lumbar   • Depression    • DVT, lower extremity ([facility] )     1991 and 2019 now on eliquis   • Exercises 5 to 6 times per week     5 days per week with weights/band and also goes to PT 2x/week   • Fibromyalgia    • History of Clostridioides difficile infection    • History of MRSA infection     pt denies having this   • History of recent fall 07/2021    "possibly injured left shoulder'   • History of TIAs     cannot remember details   • Hypertension    • Hypokalemia    • Left shoulder pain     "not too bad" goes to PT 2x/week   • Migraine    • Psychiatric disorder     Anxiety, major depression, bipolar   • Spinal stenosis    • Stroke [facility])     pt reports" not remembering having a stroke"   • Syncope 2014    orthostatic hypotension   • Wears dentures     upper   • Wears glasses        Past Surgical History:   Procedure Laterality Date   • APPENDECTOMY     • CAST APPLICATION Left 2/7/5525    Procedure: Application short-arm splint;  Surgeon: Ileana Garnica MD;  [facility]: [facility];  Service: Orthopedics   • COLONOSCOPY     • FL INJECTION LEFT SHOULDER (ARTHROGRAM)  11/10/2021   • GASTRIC RESTRICTION SURGERY      Gastric Sleeve Nov 2015   • HYSTERECTOMY      DONALD   • JOINT REPLACEMENT      Left Knee 2011 and Right Hip 2010   • ORIF WRIST FRACTURE Left 7/4/2020    Procedure: Open reduction and internal fixation left radius and ulnar shaft fracture;  Surgeon: Ileana Garnica MD;  [facility];  Service: Orthopedics   • MA ARTHROPLASTY GLENOHUMERAL JOINT TOTAL SHOULDER Left 3/30/2021    Procedure: ARTHROPLASTY SHOULDER - anatomic;  Surgeon: Sahil Toledo MD;  Location[facility];  Service: Orthopedics   • MA NATIVIDAD SHOULDER ARTHRPLSTY HUMERAL&GLENOID COMPNT Left 12/21/2021    Procedure: REVISION OF TOTAL SHOULDER ARTHROPLASTY TO REVERSE TOTAL SHOULDER ARTHROPLASTY;  Surgeon: José Manuel Medina MD;  Location: [facility];  Service: Orthopedics       Family History   Problem Relation Age of Onset   • Breast cancer Mother    • Migraines Mother    • Arthritis Mother    • Kidney disease Father    • Heart disease Father    • Diabetes Father    • Arthritis Father    • Brain cancer Brother    • Seizures Brother        Social History     Socioeconomic History   • Marital status: /Civil Union     Spouse name: None   • Number of children: None   • Years of education: None   • Highest education level: None   Occupational History   • None   Tobacco Use   • Smoking status: Former     Packs/day: 0 20     Years: 20 00     Pack years: 4 00     Types: Cigarettes     Start date: 1998     Quit date: 2008     Years since quitting: 15 1   • Smokeless tobacco: Never   • Tobacco comments:     quit   Vaping Use   • Vaping Use: Never used   Substance and Sexual Activity   • Alcohol use: Not Currently     Alcohol/week: 2 0 standard drinks     Types: 1 Glasses of wine, 1 Standard drinks or equivalent per week     Comment: occasionally • Drug use: Never     Comment: na   • Sexual activity: None     Comment: defer   Other Topics Concern   • None   Social History Narrative   • None     Social Determinants of Health     Financial Resource Strain: Not on file   Food Insecurity: Not on file   Transportation Needs: Not on file   Physical Activity: Not on file   Stress: Not on file   Social Connections: Not on file   Intimate Partner Violence: Not on file   Housing Stability: Not on file         Current Outpatient Medications:   •  amLODIPine (NORVASC) 5 mg tablet, Take 5 mg by mouth daily, Disp: , Rfl:   •  ARIPiprazole (ABILIFY) 5 mg tablet, TAKE 1 TABLET EVERY MORNING (CORRECTION BY DR OF PREV SCRIPT), Disp: , Rfl:   •  atorvastatin (LIPITOR) 20 mg tablet, Take 40 mg by mouth daily, Disp: , Rfl:   •  atorvastatin (LIPITOR) 20 mg tablet, , Disp: , Rfl:   •  atorvastatin (LIPITOR) 40 mg tablet, TAKE 1 TABLET BY MOUTH EVERY DAY AT NIGHT, Disp: , Rfl:   •  B-D 3CC LUER-GURVINDER SYR 21GX1" 21G X 1" 3 ML MISC, Use as directed, Disp: , Rfl:   •  B-D 3CC LUER-GURVINDER SYR 22GX1" 22G X 1" 3 ML MISC, Use as directed, Disp: , Rfl:   •  B-D INSULIN SYRINGE 1CC/25GX1" 25G X 1" 1 ML MISC, USE WITH DHE, Disp: , Rfl:   •  cholecalciferol (VITAMIN D3) 1,000 units tablet, , Disp: , Rfl:   •  cholecalciferol (VITAMIN D3) 25 mcg (1,000 units) tablet, Take 1,000 Units by mouth daily, Disp: , Rfl:   •  dihydroergotamine (DHE) 1 mg/mL, INJECT 1 ML (1 MG TOTAL) INJECT INTO THE MUSCLE AS NEEDED FOR MIGRAINE , Disp: , Rfl:   •  furosemide (LASIX) 20 mg tablet, Take 20 mg by mouth as needed Pt reports calls her [facility] cardiologist Dr Silvia Arboleda before taking , Disp: , Rfl:   •  gabapentin (NEURONTIN) 300 mg capsule, Take 1 capsule (300 mg total) by mouth 3 (three) times a day (Patient taking differently: Take 600 mg by mouth 3 (three) times a day), Disp: 30 capsule, Rfl: 0  •  gabapentin (NEURONTIN) 600 MG tablet, Take 600 mg by mouth 3 (three) times a day, Disp: , Rfl:   •  lamoTRIgine (LaMICtal) 200 MG tablet, Take 200 mg by mouth daily  , Disp: , Rfl:   •  LORazepam (ATIVAN) 1 mg tablet, Take 2 tablets (2 mg total) by mouth 2 (two) times a day for 10 days (Patient taking differently: Take 2 mg by mouth 3 (three) times a day One in morning, one tab afternoon two tablet at bedtime), Disp: 10 tablet, Rfl: 0  •  LORazepam (ATIVAN) 2 mg tablet, , Disp: , Rfl:   •  meclizine (ANTIVERT) 12 5 MG tablet, Take 1 tablet (12 5 mg total) by mouth every 8 (eight) hours as needed for dizziness for up to 7 days, Disp: 21 tablet, Rfl: 0  •  ondansetron (ZOFRAN-ODT) 4 mg disintegrating tablet, TAKE 1 TABLET BY MOUTH EVERY 8 HOURS AS NEEDED FOR NAUSEA AND VOMITING, Disp: , Rfl:   •  ondansetron (ZOFRAN-ODT) 4 mg disintegrating tablet, , Disp: , Rfl:   •  potassium chloride (K-DUR,KLOR-CON) 20 mEq tablet, Take 20 mEq by mouth daily  , Disp: , Rfl:   •  Trudhesa 0 725 MG/ACT AERS, , Disp: , Rfl:   •  Ubrelvy 100 MG tablet, TAKE 1 TABLET TWICE A DAY AS NEEDED FOR HEADACHE, LIMIT 16 TABS PER MONTH, Disp: , Rfl:   •  Xarelto 20 MG tablet, , Disp: , Rfl:   •  amLODIPine (NORVASC) 2 5 mg tablet, Take 2 5 mg by mouth daily at bedtime  , Disp: , Rfl:     Allergies   Allergen Reactions   • Ketorolac Itching and Other (See Comments)     [toradol] Itching, hives   • Mushroom Extract Complex - Food Allergy Hives       Objective:  /84 (BP Location: Right arm, Patient Position: Sitting, Cuff Size: Large)   Pulse 102   Temp (!) 96 °F (35 6 °C) (Tympanic)   Resp 16   Ht 5' 8" (1 727 m)   SpO2 98%   BMI 36 80 kg/m²    Physical Exam  Constitutional:       General: She is not in acute distress  Appearance: Normal appearance  She is not ill-appearing  HENT:      Head: Normocephalic and atraumatic  Eyes:      Extraocular Movements: Extraocular movements intact  Conjunctiva/sclera: Conjunctivae normal    Cardiovascular:      Rate and Rhythm: Normal rate and regular rhythm  Pulses: Normal pulses        Heart sounds: Normal heart sounds  No murmur heard  Pulmonary:      Effort: Pulmonary effort is normal  No respiratory distress  Breath sounds: Normal breath sounds  Abdominal:      General: Bowel sounds are normal  There is no distension  Palpations: Abdomen is soft  Tenderness: There is no abdominal tenderness  Musculoskeletal:         General: Normal range of motion  Cervical back: Normal range of motion  No tenderness  Right lower leg: No edema  Left lower leg: No edema  Lymphadenopathy:      Cervical: No cervical adenopathy  Skin:     General: Skin is warm and dry  Capillary Refill: Capillary refill takes less than 2 seconds  Coloration: Skin is not pale  Findings: No bruising or lesion  Neurological:      General: No focal deficit present  Mental Status: She is alert and oriented to person, place, and time  Mental status is at baseline  Motor: No weakness  Gait: Gait normal    Psychiatric:         Mood and Affect: Mood normal          Behavior: Behavior normal          Thought Content:  Thought content normal          Judgment: Judgment normal          Result Review  Labs:  Appointment on 02/17/2023   Component Date Value Ref Range Status   • WBC 02/17/2023 14 45 (H)  4 31 - 10 16 Thousand/uL Final   • RBC 02/17/2023 4 26  3 81 - 5 12 Million/uL Final   • Hemoglobin 02/17/2023 13 0  11 5 - 15 4 g/dL Final   • Hematocrit 02/17/2023 38 9  34 8 - 46 1 % Final   • MCV 02/17/2023 91  82 - 98 fL Final   • MCH 02/17/2023 30 5  26 8 - 34 3 pg Final   • MCHC 02/17/2023 33 4  31 4 - 37 4 g/dL Final   • RDW 02/17/2023 14 6  11 6 - 15 1 % Final   • MPV 02/17/2023 9 2  8 9 - 12 7 fL Final   • Platelets 84/33/6228 434 (H)  149 - 390 Thousands/uL Final   • nRBC 02/17/2023 0  /100 WBCs Final   • Vitamin B-12 02/17/2023 624  100 - 900 pg/mL Final   • Segmented Neutrophils Manual 02/17/2023 70  45 - 77 % Final   • Bands Manual 02/17/2023 4  Thousand/uL Final   • Lymphocytes Manual 02/17/2023 17  14 - 44 % Final   • Monocytes Manual 02/17/2023 7  4 - 12 % Final   • Eosinophils Manual 02/17/2023 2  0 - 6 % Final   • Total Counted 02/17/2023 100   Final   • RBC Morphology 02/17/2023 Normal   Final   • Platelet Estimate 71/99/8804 Increased (A)  Adequate Final   • Iron Saturation 02/17/2023 31  15 - 50 % Final   • TIBC 02/17/2023 401  250 - 450 ug/dL Final   • Iron 02/17/2023 126  50 - 170 ug/dL Final    Patients treated with metal-binding drugs (ie  Deferoxamine) may have depressed iron values  • Ferritin 02/17/2023 155  8 - 388 ng/mL Final       Imaging:   No relevant imaging to review     Please note: This report has been generated by a voice recognition software system  Therefore there may be syntax, spelling, and/or grammatical errors  Please call if you have any questions

## 2023-02-20 NOTE — MEDICAL HIGH UTILIZER
[address] for: Tyson Postal  Patient Name: Tyson Postal          MRN: [id number]       : 1960 Age: 57      Sex:  F   Utilization Background: She is a female with a history of migraine, Berlin’s disease, Bipolar, Depression, Fibromyalgia, and HTN who presents to [facility] with migraine  She has 14 ER visits, 9 admission, and 2 transfers to [facility] for Ketamine Infusions in 2019  Discussed with Neurology reveals that patient does not feel much better even after prolonged hospitalization  In the last 90 days: 0 encounters    Treatment Recommendations:  ED Recommendations: Recommendations as per neurology: Give migraine protocol: using steroid protocol d/t toradol allergy  Recommend calling her [facility] Neurologist to schedule close outpatient follow up  It is noted that the patient will provide other complaints to the ER providers to get admitted and then will complain of migraine in the hospital    Would not offer transfer for Ketamine Infusion to this patient as it has not worked in the past  This should be left up to Neurology to determine if this is appropriate  Inpatient Recommendations: Early consultation with neurology  Avoid narcotics/sedating agents due to over sedation in the past       Outpatient recommendations: Needs close follow up with [facility] Neurology  Needs CM to make sure that patient does not run out of her meds as she has in the past     Situation: Patient who presents frequently for migraines  It seems that she has started using other chief complaints to get admitted to the hospital for migraine treatment   As per neurology colleague her headache symptomatology does not usually change with treatment      PMH/PSH:  Migraine, Depression, Bipolar, Fibromyalgia, HTN, Hx of DVT      Assessment                              Drivers of repeated utilization:                 Symptomatology     Community Resources in place:    None - she has mentioned that she does not have a  for infusions  May need assistance with transportation and with medications   Patient Care Team:   Parish Baldwin MD - PCP [facility])  Yuki Bass CRNP/Luigi Byrne MD - Neurology [facility])  OP Care Manager: Efren Srinivasan RN              Care plan date and owner: Spencer Huffman PA-C 10/31/2019         Reviewed with patient before discharge?

## 2023-02-28 ENCOUNTER — OFFICE VISIT (OUTPATIENT)
Dept: OBGYN CLINIC | Facility: MEDICAL CENTER | Age: 63
End: 2023-02-28

## 2023-02-28 VITALS
SYSTOLIC BLOOD PRESSURE: 184 MMHG | HEIGHT: 68 IN | WEIGHT: 242 LBS | DIASTOLIC BLOOD PRESSURE: 100 MMHG | BODY MASS INDEX: 36.68 KG/M2 | HEART RATE: 98 BPM

## 2023-02-28 DIAGNOSIS — M70.52 PES ANSERINUS BURSITIS OF LEFT KNEE: Primary | ICD-10-CM

## 2023-02-28 DIAGNOSIS — Z96.652 HISTORY OF LEFT KNEE REPLACEMENT: ICD-10-CM

## 2023-02-28 RX ORDER — TRIAMCINOLONE ACETONIDE 40 MG/ML
40 INJECTION, SUSPENSION INTRA-ARTICULAR; INTRAMUSCULAR
Status: COMPLETED | OUTPATIENT
Start: 2023-02-28 | End: 2023-02-28

## 2023-02-28 RX ORDER — BUPIVACAINE HYDROCHLORIDE 5 MG/ML
2 INJECTION, SOLUTION EPIDURAL; INTRACAUDAL
Status: COMPLETED | OUTPATIENT
Start: 2023-02-28 | End: 2023-02-28

## 2023-02-28 RX ADMIN — BUPIVACAINE HYDROCHLORIDE 2 ML: 5 INJECTION, SOLUTION EPIDURAL; INTRACAUDAL at 15:36

## 2023-02-28 RX ADMIN — TRIAMCINOLONE ACETONIDE 40 MG: 40 INJECTION, SUSPENSION INTRA-ARTICULAR; INTRAMUSCULAR at 15:36

## 2023-02-28 NOTE — PROGRESS NOTES
Knee New Office Note    Assessment:     1  History of left knee replacement        Plan:     Problem List Items Addressed This Visit    None  Visit Diagnoses     History of left knee replacement              56 y/o female with left knee pain secondary to pes bursitis  She has a TKA on the left from 2005 with Dr Meri Mcclelland  Xrays of the left knee reviewed today showing no loosening of the prosthesis or fracture noted  On physical exam she has good motion  Pain to palpation over the pes bursa on exam today  Discussed that we will start with conservative treatment with cortisone injection into the pes bursa  Discussed stretching of the hamstrings  Follow up as needed  Subjective:     Patient ID: Jason Billings is a 57 y o  female  Chief Complaint:  HPI:  Patient is a 56 y/o female who presents today for an evaluation of her LEFT knee  She has a history of a Left TKA from 2005 with Dr Meri Mcclelland  She states that recently she has been experiencing increased pain in the left knee and feeling like the knee is going to give way on her  She was seeing Dr Jewels Olmos who took xrays of the knee and advised follow up with joint specialist due to her presence of TKA  She denies any recent injury to the knee  She is also treating for Right knee OA, most recently with Right knee cortisone injection on 1/31/23      Allergy:  Allergies   Allergen Reactions   • Ketorolac Itching and Other (See Comments)     [toradol] Itching, hives   • Mushroom Extract Complex - Food Allergy Hives     Medications:  all current active meds have been reviewed  Past Medical History:  Past Medical History:   Diagnosis Date   • Adrenal insufficiency (Ogle's disease) ([facility] [address] )    • Anxiety    • Arthritis    • Bipolar disorder    • Cervical radiculopathy    • Chronic back pain    • Chronic pain disorder     lumbar   • Depression    • DVT, lower extremity ([address] )     1991 and 2019 now on eliquis   • Exercises 5 to 6 times per week     5 days per week with weights/band and also goes to PT 2x/week   • Fibromyalgia    • History of Clostridioides difficile infection    • History of MRSA infection     pt denies having this   • History of recent fall 07/2021    "possibly injured left shoulder'   • History of TIAs     cannot remember details   • Hypertension    • Hypokalemia    • Left shoulder pain     "not too bad" goes to PT 2x/week   • Migraine    • Psychiatric disorder     Anxiety, major depression, bipolar   • Spinal stenosis    • Stroke [facility])     pt reports" not remembering having a stroke"   • Syncope 2014    orthostatic hypotension   • Wears dentures     upper   • Wears glasses      Past Surgical History:  Past Surgical History:   Procedure Laterality Date   • APPENDECTOMY     • CAST APPLICATION Left 1/5/4360    Procedure: Application short-arm splint;  Surgeon: Mitra Graves MD;  [facility];  Service: Orthopedics   • COLONOSCOPY     • FL INJECTION LEFT SHOULDER (ARTHROGRAM)  11/10/2021   • GASTRIC RESTRICTION SURGERY      Gastric Sleeve Nov 2015   • HYSTERECTOMY      DONALD   • JOINT REPLACEMENT      Left Knee 2011 and Right Hip 2010   • ORIF WRIST FRACTURE Left 7/4/2020    Procedure: Open reduction and internal fixation left radius and ulnar shaft fracture;  Surgeon: Mitra Graves MD;  [facility];  Service: Orthopedics   • ME ARTHROPLASTY GLENOHUMERAL JOINT TOTAL SHOULDER Left 3/30/2021    Procedure: ARTHROPLASTY SHOULDER - anatomic;  Surgeon: Meaghan Michaud MD;  [facility];  Service: Orthopedics   • ME NATIVIDAD SHOULDER ARTHRPLSTY HUMERAL&GLENOID COMPNT Left 12/21/2021    Procedure: REVISION OF TOTAL SHOULDER ARTHROPLASTY TO REVERSE TOTAL SHOULDER ARTHROPLASTY;  Surgeon: Damian West MD;  [facility];  Service: Orthopedics     Family History:  Family History   Problem Relation Age of Onset   • Breast cancer Mother    • Migraines Mother    • Arthritis Mother    • Kidney disease Father    • Heart disease Father    • Diabetes Father    • Arthritis Father    • Brain cancer Brother    • Seizures Brother      Social History:  Social History     Substance and Sexual Activity   Alcohol Use Not Currently   • Alcohol/week: 2 0 standard drinks   • Types: 1 Glasses of wine, 1 Standard drinks or equivalent per week    Comment: occasionally     Social History     Substance and Sexual Activity   Drug Use Never    Comment: na     Social History     Tobacco Use   Smoking Status Former   • Packs/day: 0 20   • Years: 20 00   • Pack years: 4 00   • Types: Cigarettes   • Start date: 0   • Quit date: 2008   • Years since quitting: 15 1   Smokeless Tobacco Never   Tobacco Comments    quit           ROS:  General: Per HPI  Skin: Negative, except if noted below  HEENT: Negative  Respiratory: Negative  Cardiovascular: Negative  Gastrointestinal: Negative  Urinary: Negative  Vascular: Negative  Musculoskeletal: Positive per HPI   Neurologic: Positive per HPI  Endocrine: Negative    Objective:  BP Readings from Last 1 Encounters:   02/28/23 (!) 184/100      Wt Readings from Last 1 Encounters:   02/28/23 110 kg (242 lb)        Respiratory:   non-labored respirations    Lymphatics:  no palpable lymph nodes    Gait:   antalgic    Neurologic:   Alert and oriented times 3  Patient with normal sensation except as noted below  Deep tendon reflexes 2+ except as noted in MSK exam    Bilateral Lower Extremity:  Left Knee: Inspection:  Skin intact, previous surgical scar noted    Overall limb alignment normal    Effusion: none    ROM 0-120 wo pain    Extensor Lag: none    Palpation: pes bursa tenderness to palpation    AP Stability at 90 deg stable    M/L stability in full extension stable    M/L stability in midflexion stable    Motor: 5/5 IP/Q/HS/TA/GS    Pulses: 2+ DP / 2+ PT    SILT DP/SP/S/S/TN    Right knee:      Inspection:  Skin intact    Overall limb alignment normal    Effusion: none    ROM 0-115 wo pain    Extensor Lag: none Palpation: medial Joint line tenderness to palpation    AP Stability at 90 deg stable    M/L stability in full extension stable    M/L stability in midflexion stable    Motor: 5/5 IP/Q/HS/TA/GS    Pulses: 2+ DP / 2+ PT    SILT DP/SP/S/S/TN    Imaging:  My interpretation XR AP bilateral knee/lateral/ashton/sunrise left knee: s/p cemented CR triathalon, components in good position  No fracture or dislocation  Large joint arthrocentesis: L anserine bursa  Universal Protocol:  Consent: Verbal consent obtained  Risks and benefits: risks, benefits and alternatives were discussed  Consent given by: patient  Patient understanding: patient states understanding of the procedure being performed    Supporting Documentation  Indications: pain   Procedure Details  Location: knee - L anserine bursa  Preparation: Patient was prepped and draped in the usual sterile fashion  Needle size: 22 G  Approach: anterolateral  Medications administered: 40 mg triamcinolone acetonide 40 mg/mL; 2 mL bupivacaine (PF) 0 5 %    Patient tolerance: patient tolerated the procedure well with no immediate complications  Dressing:  Sterile dressing applied          BMI:   Estimated body mass index is 36 8 kg/m² as calculated from the following:    Height as of this encounter: 5' 8" (1 727 m)  Weight as of this encounter: 110 kg (242 lb)  BSA:   Estimated body surface area is 2 22 meters squared as calculated from the following:    Height as of this encounter: 5' 8" (1 727 m)  Weight as of this encounter: 110 kg (242 lb)             Scribe Attestation    I,:  Sanjay Vela PA-C am acting as a scribe while in the presence of the attending physician :       I,:  Rodolfo Coulter DO personally performed the services described in this documentation    as scribed in my presence :

## 2023-03-06 LAB — METHYLMALONATE SERPL-SCNC: 287 NMOL/L (ref 0–378)

## 2023-03-08 ENCOUNTER — HOSPITAL ENCOUNTER (OUTPATIENT)
Dept: INFUSION CENTER | Facility: CLINIC | Age: 63
Discharge: HOME | End: 2023-03-08

## 2023-03-08 DIAGNOSIS — K95.89 IRON DEFICIENCY ANEMIA FOLLOWING BARIATRIC SURGERY: ICD-10-CM

## 2023-03-08 DIAGNOSIS — E53.8 B12 DEFICIENCY: Primary | ICD-10-CM

## 2023-03-08 DIAGNOSIS — D50.8 IRON DEFICIENCY ANEMIA FOLLOWING BARIATRIC SURGERY: ICD-10-CM

## 2023-03-08 RX ORDER — CYANOCOBALAMIN 1000 UG/ML
1000 INJECTION, SOLUTION INTRAMUSCULAR; SUBCUTANEOUS ONCE
OUTPATIENT
Start: 2023-04-05

## 2023-03-08 RX ORDER — CYANOCOBALAMIN 1000 UG/ML
1000 INJECTION, SOLUTION INTRAMUSCULAR; SUBCUTANEOUS ONCE
Status: COMPLETED | OUTPATIENT
Start: 2023-03-08 | End: 2023-03-08

## 2023-03-08 RX ADMIN — CYANOCOBALAMIN 1000 MCG: 1000 INJECTION, SOLUTION INTRAMUSCULAR at 10:33

## 2023-03-08 NOTE — PROGRESS NOTES
Pt presents for B12  No new meds or concerns  Pt tolerated injection in BERT without adverse reaction  Future appointments made  AVS declined

## 2023-04-21 NOTE — PLAN OF CARE
Problem: Potential for Falls  Goal: Patient will remain free of falls  Description: INTERVENTIONS:  - Educate patient/family on patient safety including physical limitations  - Instruct patient to call for assistance with activity   - Consult OT/PT to assist with strengthening/mobility   - Keep Call bell within reach  - Keep bed low and locked with side rails adjusted as appropriate  - Keep care items and personal belongings within reach  - Initiate and maintain comfort rounds  - Make Fall Risk Sign visible to staff  - Offer Toileting every  Hours, in advance of need  - Initiate/Maintain alarm  - Obtain necessary fall risk management equipment:   - Apply yellow socks and bracelet for high fall risk patients  - Consider moving patient to room near nurses station  Outcome: Progressing No

## 2023-05-03 ENCOUNTER — HOSPITAL ENCOUNTER (OUTPATIENT)
Dept: INFUSION CENTER | Facility: CLINIC | Age: 63
Discharge: HOME | End: 2023-05-03

## 2023-05-03 DIAGNOSIS — E53.8 B12 DEFICIENCY: Primary | ICD-10-CM

## 2023-05-03 DIAGNOSIS — D50.8 IRON DEFICIENCY ANEMIA FOLLOWING BARIATRIC SURGERY: ICD-10-CM

## 2023-05-03 DIAGNOSIS — K95.89 IRON DEFICIENCY ANEMIA FOLLOWING BARIATRIC SURGERY: ICD-10-CM

## 2023-05-03 RX ORDER — CYANOCOBALAMIN 1000 UG/ML
1000 INJECTION, SOLUTION INTRAMUSCULAR; SUBCUTANEOUS ONCE
Status: COMPLETED | OUTPATIENT
Start: 2023-05-03 | End: 2023-05-03

## 2023-05-03 RX ORDER — CYANOCOBALAMIN 1000 UG/ML
1000 INJECTION, SOLUTION INTRAMUSCULAR; SUBCUTANEOUS ONCE
OUTPATIENT
Start: 2023-05-31

## 2023-05-03 RX ADMIN — CYANOCOBALAMIN 1000 MCG: 1000 INJECTION, SOLUTION INTRAMUSCULAR at 08:58

## 2023-05-03 NOTE — PROGRESS NOTES
Patient arrived to unit without complaint  Patient tolerated Vitamin B12 IM injection to right deltoid without incident  AVS declined and patient aware of next appointment  Patient left in stable condition

## 2023-05-19 ENCOUNTER — APPOINTMENT (OUTPATIENT)
Dept: LAB | Facility: MEDICAL CENTER | Age: 63
End: 2023-05-19

## 2023-05-19 ENCOUNTER — DOCUMENTATION (OUTPATIENT)
Dept: CASE MANAGEMENT | Facility: OTHER | Age: 63
End: 2023-05-19

## 2023-05-19 DIAGNOSIS — D72.829 LEUKOCYTOSIS, UNSPECIFIED TYPE: ICD-10-CM

## 2023-05-19 DIAGNOSIS — E53.8 B12 DEFICIENCY: ICD-10-CM

## 2023-05-19 DIAGNOSIS — K95.89 IRON DEFICIENCY ANEMIA FOLLOWING BARIATRIC SURGERY: ICD-10-CM

## 2023-05-19 DIAGNOSIS — D75.839 THROMBOCYTOSIS: ICD-10-CM

## 2023-05-19 DIAGNOSIS — D50.8 IRON DEFICIENCY ANEMIA FOLLOWING BARIATRIC SURGERY: ICD-10-CM

## 2023-05-19 DIAGNOSIS — D64.9 NORMOCYTIC ANEMIA: ICD-10-CM

## 2023-05-19 LAB
ALBUMIN SERPL BCP-MCNC: 3.5 G/DL (ref 3.5–5)
ALP SERPL-CCNC: 165 U/L (ref 46–116)
ALT SERPL W P-5'-P-CCNC: 16 U/L (ref 12–78)
ANION GAP SERPL CALCULATED.3IONS-SCNC: 4 MMOL/L (ref 4–13)
AST SERPL W P-5'-P-CCNC: 15 U/L (ref 5–45)
BASOPHILS # BLD AUTO: 0.11 THOUSANDS/ÂΜL (ref 0–0.1)
BASOPHILS NFR BLD AUTO: 1 % (ref 0–1)
BILIRUB SERPL-MCNC: 0.39 MG/DL (ref 0.2–1)
BUN SERPL-MCNC: 12 MG/DL (ref 5–25)
CALCIUM SERPL-MCNC: 9.4 MG/DL (ref 8.3–10.1)
CHLORIDE SERPL-SCNC: 98 MMOL/L (ref 96–108)
CO2 SERPL-SCNC: 27 MMOL/L (ref 21–32)
CREAT SERPL-MCNC: 1 MG/DL (ref 0.6–1.3)
EOSINOPHIL # BLD AUTO: 0.25 THOUSAND/ÂΜL (ref 0–0.61)
EOSINOPHIL NFR BLD AUTO: 2 % (ref 0–6)
ERYTHROCYTE [DISTWIDTH] IN BLOOD BY AUTOMATED COUNT: 13.2 % (ref 11.6–15.1)
FERRITIN SERPL-MCNC: 65 NG/ML (ref 11–307)
FOLATE SERPL-MCNC: 6.3 NG/ML
GFR SERPL CREATININE-BSD FRML MDRD: 59 ML/MIN/1.73SQ M
GLUCOSE SERPL-MCNC: 91 MG/DL (ref 65–140)
HCT VFR BLD AUTO: 38.8 % (ref 34.8–46.1)
HGB BLD-MCNC: 13 G/DL (ref 11.5–15.4)
IMM GRANULOCYTES # BLD AUTO: 0.12 THOUSAND/UL (ref 0–0.2)
IMM GRANULOCYTES NFR BLD AUTO: 1 % (ref 0–2)
IRON SATN MFR SERPL: 20 % (ref 15–50)
IRON SERPL-MCNC: 77 UG/DL (ref 50–170)
LYMPHOCYTES # BLD AUTO: 1.61 THOUSANDS/ÂΜL (ref 0.6–4.47)
LYMPHOCYTES NFR BLD AUTO: 16 % (ref 14–44)
MCH RBC QN AUTO: 31.5 PG (ref 26.8–34.3)
MCHC RBC AUTO-ENTMCNC: 33.5 G/DL (ref 31.4–37.4)
MCV RBC AUTO: 94 FL (ref 82–98)
MONOCYTES # BLD AUTO: 0.98 THOUSAND/ÂΜL (ref 0.17–1.22)
MONOCYTES NFR BLD AUTO: 9 % (ref 4–12)
NEUTROPHILS # BLD AUTO: 7.32 THOUSANDS/ÂΜL (ref 1.85–7.62)
NEUTS SEG NFR BLD AUTO: 71 % (ref 43–75)
NRBC BLD AUTO-RTO: 0 /100 WBCS
PLATELET # BLD AUTO: 494 THOUSANDS/UL (ref 149–390)
PMV BLD AUTO: 9.4 FL (ref 8.9–12.7)
POTASSIUM SERPL-SCNC: 4.4 MMOL/L (ref 3.5–5.3)
PROT SERPL-MCNC: 7.5 G/DL (ref 6.4–8.4)
RBC # BLD AUTO: 4.13 MILLION/UL (ref 3.81–5.12)
SODIUM SERPL-SCNC: 129 MMOL/L (ref 135–147)
TIBC SERPL-MCNC: 382 UG/DL (ref 250–450)
VIT B12 SERPL-MCNC: 508 PG/ML (ref 180–914)
WBC # BLD AUTO: 10.39 THOUSAND/UL (ref 4.31–10.16)

## 2023-05-19 NOTE — MEDICAL HIGH UTILIZER
[address] for: Carolyn Gamboa  Patient Name: Carolyn Gamboa          MRN: [id number]       : 1960 Age: 57      Sex:  F   Utilization Background: She is a female with a history of migraine, Andrés’s disease, Bipolar, Depression, Fibromyalgia, and HTN who presents to [facility] and [facility] with migraine  She has 14 ER visits, 9 admission, and 2 transfers to [facility] for Ketamine Infusions in 2019  Discussed with Neurology reveals that patient does not feel much better even after prolonged hospitalization  In the last 90 days: 0 encounters    Treatment Recommendations:  ED Recommendations: Recommendations as per neurology: Give migraine protocol: using steroid protocol d/t toradol allergy  Recommend calling her [facility] Neurologist to schedule close outpatient follow up  It is noted that the patient will provide other complaints to the ER providers to get admitted and then will complain of migraine in the hospital    Would not offer transfer for Ketamine Infusion to this patient as it has not worked in the past  This should be left up to Neurology to determine if this is appropriate  Inpatient Recommendations: Early consultation with neurology  Avoid narcotics/sedating agents due to over sedation in the past       Outpatient recommendations: Needs close follow up with [facility]  Needs CM to make sure that patient does not run out of her meds as she has in the past     Situation: Patient who presents frequently for migraines  It seems that she has started using other chief complaints to get admitted to the hospital for migraine treatment   As per neurology colleague her headache symptomatology does not usually change with treatment      PMH/PSH:  Migraine, Depression, Bipolar, Fibromyalgia, HTN, Hx of DVT      Assessment                              Drivers of repeated utilization:                 Symptomatology     Community Resources in place:    None - she has mentioned that she does not have a  for infusions  May need assistance with transportation and with medications   Patient Care Team:   Ese Sims MD - PCP [facility])  Danelle Mckinney CRNP/Luigi Solorio MD - Neurology [facility])  Misa Nicole MD - Hematology [facility])  OP Care Manager: Rafi Parker RN              Care plan date and owner: Jose E Coleman PA-C 10/31/2019         Reviewed with patient before discharge?

## 2023-05-22 ENCOUNTER — PATIENT OUTREACH (OUTPATIENT)
Dept: CASE MANAGEMENT | Facility: OTHER | Age: 63
End: 2023-05-22

## 2023-05-22 NOTE — PROGRESS NOTES
In basket handoff received for patient with medical HUCP currently in surveillance  Outside records through [facility] requested and refreshed  Chart review completed  No known utilization since 12/2021  Future appointments:  5/25/2023 11:30 AM Appointment with Bunny Churchill PA-C at [facility] ([phone])   5/31/2023 9:00 AM Appointment with [facility] CHAIR [facility] at [address] ([phone])   6/9/2023 2:00 PM Appointment with Evelin Rincon MD at [facility] ([phone])   6/28/2023 10:00 AM Appointment with [facility] INJ CHAIR 14 at [address] ([phone])     Will continue to follow in surveillance and review chart 90 days from today

## 2023-05-24 LAB — METHYLMALONATE SERPL-SCNC: 412 NMOL/L (ref 0–378)

## 2023-05-30 ENCOUNTER — APPOINTMENT (OUTPATIENT)
Dept: RADIOLOGY | Facility: OTHER | Age: 63
End: 2023-05-30

## 2023-05-30 ENCOUNTER — OFFICE VISIT (OUTPATIENT)
Dept: OBGYN CLINIC | Facility: OTHER | Age: 63
End: 2023-05-30

## 2023-05-30 VITALS
HEIGHT: 68 IN | SYSTOLIC BLOOD PRESSURE: 141 MMHG | DIASTOLIC BLOOD PRESSURE: 85 MMHG | HEART RATE: 94 BPM | WEIGHT: 260 LBS | BODY MASS INDEX: 39.4 KG/M2

## 2023-05-30 DIAGNOSIS — Z96.612 AFTERCARE FOLLOWING LEFT SHOULDER JOINT REPLACEMENT SURGERY: ICD-10-CM

## 2023-05-30 DIAGNOSIS — M19.012 PRIMARY OSTEOARTHRITIS OF LEFT SHOULDER: ICD-10-CM

## 2023-05-30 DIAGNOSIS — M19.011 OSTEOARTHRITIS OF RIGHT GLENOHUMERAL JOINT: Primary | ICD-10-CM

## 2023-05-30 DIAGNOSIS — Z47.1 AFTERCARE FOLLOWING LEFT SHOULDER JOINT REPLACEMENT SURGERY: ICD-10-CM

## 2023-05-30 RX ORDER — BUPIVACAINE HYDROCHLORIDE 2.5 MG/ML
2 INJECTION, SOLUTION INFILTRATION; PERINEURAL
Status: COMPLETED | OUTPATIENT
Start: 2023-05-30 | End: 2023-05-30

## 2023-05-30 RX ORDER — BETAMETHASONE SODIUM PHOSPHATE AND BETAMETHASONE ACETATE 3; 3 MG/ML; MG/ML
6 INJECTION, SUSPENSION INTRA-ARTICULAR; INTRALESIONAL; INTRAMUSCULAR; SOFT TISSUE
Status: COMPLETED | OUTPATIENT
Start: 2023-05-30 | End: 2023-05-30

## 2023-05-30 RX ORDER — METHOCARBAMOL 750 MG/1
TABLET, FILM COATED ORAL
COMMUNITY
Start: 2023-05-08

## 2023-05-30 RX ADMIN — BUPIVACAINE HYDROCHLORIDE 2 ML: 2.5 INJECTION, SOLUTION INFILTRATION; PERINEURAL at 14:00

## 2023-05-30 RX ADMIN — BETAMETHASONE SODIUM PHOSPHATE AND BETAMETHASONE ACETATE 6 MG: 3; 3 INJECTION, SUSPENSION INTRA-ARTICULAR; INTRALESIONAL; INTRAMUSCULAR; SOFT TISSUE at 14:00

## 2023-05-30 NOTE — PATIENT INSTRUCTIONS

## 2023-05-30 NOTE — PROGRESS NOTES
Assessment  Diagnoses and all orders for this visit:    Osteoarthritis of right glenohumeral joint    Aftercare following left shoulder joint replacement surgery  -     XR shoulder 2+ vw left; Future  -     CT upper extremity wo contrast left; Future    Primary osteoarthritis of left shoulder  -     XR shoulder 2+ vw left; Future  -     CT upper extremity wo contrast left; Future        Discussion and Plan:    Daiana Cobos is symptomatic from her right shoulder glenohumeral osteoarthritis  Steroid injection was offered for pain relief  She accepted injection  She may slowly resume activities over the next 24-48 hours  Injection can safely be repeated every 4-6 months if needed  Daiana Cobos would like a follow up appointment for 4 months for repeat evaluation and discussion about her right shoulder osteoarthritis  For her left shoulder, Daiana Cobos will stop using the left arm for ADLs  Avoid all activities that cause pain - overhead and crossbody  CT of the left shoulder will be ordered for 2 weeks from now - if symptoms persist despite rest, she will proceed with CT (with MARS protocol)  We will see her back after the CT for the left shoulder  Subjective:   Patient ID: Dave  is a 57 y o  female    Daiana Cobos returns to the office in follow up of the right shoulder  She has osteoarthritis of this shoulder  She has responded well to steroid injections in the past - last steroid injection was 4 months ago  Daiana Cobos reports 4 months of relief with the injection  Denies new injury or trauma  Pain occurs with motion  Pain improves with rest and Tylenol  Admits to pain that interferes with sleep  Daiana Cobos also notes increase in left shoulder pain  She had a conversion of anatomic to reverse TSA 1 5 years ago (12/21/21)  She states she was doing well until about 2 weeks ago  Pain is localized to anterior deltoid near incision    She started having pain with overhead motion (lifting small objects into cabinets) as "well as cross body motion  She notes pain interferes with sleep as well as ADLs  Denies injury or trauma  Denies fever or chills  The following portions of the patient's history were reviewed and updated as appropriate: allergies, current medications, past family history, past medical history, past social history, past surgical history and problem list     Review of Systems   Constitutional: Negative for chills and fever  HENT: Negative for hearing loss  Eyes: Negative for visual disturbance  Respiratory: Negative for shortness of breath  Cardiovascular: Negative for chest pain  Gastrointestinal: Negative for abdominal pain  Musculoskeletal:        As reviewed in the HPI   Skin: Negative for rash  Neurological:        As reviewed in the HPI   Psychiatric/Behavioral: Negative for agitation  Objective:  /85   Pulse 94   Ht 5' 8\" (1 727 m)   Wt 118 kg (260 lb)   BMI 39 53 kg/m²       Right Shoulder Exam     Tenderness   The patient is experiencing no tenderness  Range of Motion   The patient has normal right shoulder ROM  Muscle Strength   External rotation: 5/5     Other   Erythema: absent  Sensation: normal  Pulse: present      Left Shoulder Exam     Tenderness   Left shoulder tenderness location: anterior deltoid  Range of Motion   Passive abduction: normal Left shoulder passive abduction: with pain  Forward flexion: 140 (with pain)   Internal rotation 0 degrees: Sacrum     Other   Erythema: absent  Scars: present (healed deltopectoral incision without signs of infection)  Sensation: normal  Pulse: present             Physical Exam  Constitutional:       Appearance: She is well-developed  HENT:      Head: Normocephalic  Pulmonary:      Breath sounds: Normal breath sounds  No wheezing  Musculoskeletal:      Cervical back: Normal range of motion  Skin:     General: Skin is warm and dry     Neurological:      Mental Status: She is alert and oriented to person, " place, and time  Psychiatric:         Behavior: Behavior normal          Thought Content: Thought content normal          Judgment: Judgment normal        Large joint arthrocentesis: R glenohumeral  [place] Protocol:  Consent: Verbal consent obtained    Consent given by: patient    Supporting Documentation  Indications: pain   Procedure Details  Location: shoulder - R glenohumeral  Preparation: Patient was prepped and draped in the usual sterile fashion  Needle size: 22 G  Ultrasound guidance: no  Approach: posterior  Medications administered: 6 mg betamethasone acetate-betamethasone sodium phosphate 6 (3-3) mg/mL; 2 mL bupivacaine 0 25 %    Patient tolerance: patient tolerated the procedure well with no immediate complications  Dressing:  Sterile dressing applied

## 2023-05-31 ENCOUNTER — HOSPITAL ENCOUNTER (OUTPATIENT)
Dept: INFUSION CENTER | Facility: CLINIC | Age: 63
Discharge: HOME | End: 2023-05-31
Payer: COMMERCIAL

## 2023-05-31 DIAGNOSIS — K95.89 IRON DEFICIENCY ANEMIA FOLLOWING BARIATRIC SURGERY: ICD-10-CM

## 2023-05-31 DIAGNOSIS — E53.8 B12 DEFICIENCY: Primary | ICD-10-CM

## 2023-05-31 DIAGNOSIS — D50.8 IRON DEFICIENCY ANEMIA FOLLOWING BARIATRIC SURGERY: ICD-10-CM

## 2023-05-31 PROCEDURE — 96372 THER/PROPH/DIAG INJ SC/IM: CPT

## 2023-05-31 RX ORDER — CYANOCOBALAMIN 1000 UG/ML
1000 INJECTION, SOLUTION INTRAMUSCULAR; SUBCUTANEOUS ONCE
OUTPATIENT
Start: 2023-06-28

## 2023-05-31 RX ORDER — CYANOCOBALAMIN 1000 UG/ML
1000 INJECTION, SOLUTION INTRAMUSCULAR; SUBCUTANEOUS ONCE
Status: COMPLETED | OUTPATIENT
Start: 2023-05-31 | End: 2023-05-31

## 2023-05-31 RX ADMIN — CYANOCOBALAMIN 1000 MCG: 1000 INJECTION, SOLUTION INTRAMUSCULAR at 08:45

## 2023-06-09 ENCOUNTER — OFFICE VISIT (OUTPATIENT)
Dept: FAMILY MEDICINE CLINIC | Facility: CLINIC | Age: 63
End: 2023-06-09
Payer: COMMERCIAL

## 2023-06-09 VITALS
HEIGHT: 68 IN | TEMPERATURE: 96.3 F | SYSTOLIC BLOOD PRESSURE: 120 MMHG | BODY MASS INDEX: 39.86 KG/M2 | HEART RATE: 96 BPM | DIASTOLIC BLOOD PRESSURE: 58 MMHG | WEIGHT: 263 LBS | OXYGEN SATURATION: 96 %

## 2023-06-09 DIAGNOSIS — E27.40 ADRENAL INSUFFICIENCY (HCC): ICD-10-CM

## 2023-06-09 DIAGNOSIS — R29.898 WEAKNESS OF BOTH LOWER EXTREMITIES: ICD-10-CM

## 2023-06-09 DIAGNOSIS — F31.81 BIPOLAR II DISORDER (HCC): ICD-10-CM

## 2023-06-09 DIAGNOSIS — Z00.00 ROUTINE ADULT HEALTH MAINTENANCE: ICD-10-CM

## 2023-06-09 DIAGNOSIS — Z78.0 POSTMENOPAUSAL: ICD-10-CM

## 2023-06-09 DIAGNOSIS — Z12.31 ENCOUNTER FOR SCREENING MAMMOGRAM FOR MALIGNANT NEOPLASM OF BREAST: Primary | ICD-10-CM

## 2023-06-09 DIAGNOSIS — F13.20 BENZODIAZEPINE DEPENDENCE (HCC): ICD-10-CM

## 2023-06-09 DIAGNOSIS — R26.2 AMBULATORY DYSFUNCTION: ICD-10-CM

## 2023-06-09 PROBLEM — Z79.01 ANTICOAGULATED BY ANTICOAGULATION TREATMENT: Status: ACTIVE | Noted: 2018-03-27

## 2023-06-09 PROBLEM — R60.0 LOWER EXTREMITY EDEMA: Status: ACTIVE | Noted: 2020-03-09

## 2023-06-09 PROBLEM — R53.83 FATIGUE: Status: ACTIVE | Noted: 2019-03-11

## 2023-06-09 PROBLEM — M54.81 BILATERAL OCCIPITAL NEURALGIA: Status: ACTIVE | Noted: 2018-04-22

## 2023-06-09 PROBLEM — F31.9 BIPOLAR DEPRESSION (HCC): Status: ACTIVE | Noted: 2017-07-10

## 2023-06-09 PROBLEM — Z86.718 HISTORY OF DVT (DEEP VEIN THROMBOSIS): Status: ACTIVE | Noted: 2019-04-10

## 2023-06-09 PROBLEM — R73.9 HYPERGLYCEMIA: Status: ACTIVE | Noted: 2019-01-04

## 2023-06-09 PROBLEM — F33.0 MILD EPISODE OF RECURRENT MAJOR DEPRESSIVE DISORDER (HCC): Status: ACTIVE | Noted: 2019-03-11

## 2023-06-09 PROBLEM — R29.6 RECURRENT FALLS: Status: ACTIVE | Noted: 2018-09-13

## 2023-06-09 PROBLEM — M47.816 LUMBAR SPONDYLOSIS: Status: ACTIVE | Noted: 2019-03-11

## 2023-06-09 PROBLEM — G47.10 HYPERSOMNIA: Status: ACTIVE | Noted: 2019-03-11

## 2023-06-09 PROBLEM — G43.509 MIGRAINE AURA, PERSISTENT: Status: ACTIVE | Noted: 2018-03-02

## 2023-06-09 PROBLEM — G62.9 PERIPHERAL NEUROPATHY: Status: ACTIVE | Noted: 2017-07-10

## 2023-06-09 PROBLEM — R11.2 INTRACTABLE VOMITING WITH NAUSEA: Status: ACTIVE | Noted: 2018-08-24

## 2023-06-09 PROBLEM — I82.4Z1 LOWER LEG DVT (DEEP VENOUS THROMBOEMBOLISM), ACUTE, RIGHT (HCC): Status: ACTIVE | Noted: 2018-03-16
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
## 2023-06-09 PROBLEM — M25.551 RIGHT HIP PAIN: Status: ACTIVE | Noted: 2018-04-22

## 2023-06-09 PROBLEM — E05.90 SUBCLINICAL HYPERTHYROIDISM: Status: ACTIVE | Noted: 2022-09-28

## 2023-06-09 PROBLEM — S52.513A CLOSED FRACTURE OF STYLOID PROCESS OF RADIUS: Status: ACTIVE | Noted: 2018-07-16

## 2023-06-09 PROCEDURE — 99204 OFFICE O/P NEW MOD 45 MIN: CPT | Performed by: FAMILY MEDICINE

## 2023-06-09 PROCEDURE — 99386 PREV VISIT NEW AGE 40-64: CPT | Performed by: FAMILY MEDICINE

## 2023-06-12 PROBLEM — Z00.00 ROUTINE ADULT HEALTH MAINTENANCE: Status: ACTIVE | Noted: 2023-06-12

## 2023-06-12 PROBLEM — Z78.0 POSTMENOPAUSAL: Status: ACTIVE | Noted: 2023-06-12

## 2023-06-12 NOTE — PROGRESS NOTES
Assessment/Plan:    58 y/o woman with: postmenopausal, bipolar d/o, adrenal insufficiency, benzodiazepine dependence, along with urinary frequency with weakness in her legs and ambulatory dysfunction along with annual well visit  Patient will continue current meds  And follow with current specialists  Will refer to PT  Discussed supportive care and return parameters  Regarding Annual well visit, encourage various safety and health maintenance issues including healthy diet like the Mediterranean diet, exercise, ample sleep, stress reduction, and healthy weight as tolerated  Discussed supportive care and return parameters  No problem-specific Assessment & Plan notes found for this encounter  Diagnoses and all orders for this visit:    Encounter for screening mammogram for malignant neoplasm of breast  -     Mammo screening bilateral w 3d & cad; Future    Postmenopausal  -     DXA bone density spine hip and pelvis; Future    Benzodiazepine dependence (HCC)    Bipolar II disorder (HCC)    Adrenal insufficiency (HCC)    Weakness of both lower extremities  -     VAS lower limb arterial duplex, complete bilateral; Future  -     Ambulatory Referral to Physical Therapy; Future    Ambulatory dysfunction  -     Ambulatory Referral to Physical Therapy; Future    Routine adult health maintenance          Subjective:     Chief Complaint   Patient presents with   • New Patient Visit     Establish care   • Well Check     Annual physical due at today's visit  BMI follow up plan due for patient  Osteopetrosis screening order put in and mammogram order placed  No further concerns,ng   • Urinary Frequency     Urinary frequency where she can nt make it to the bathroom  • Extremity Weakness     Patient states she has bilateral leg weakness  Patient ID: Daryle Castleman is a 57 y o  female  Patient is a 58 y/o woman who presents to establish care at this presence   Patient is postmenopausal, bipolar d/o, adrenal "insufficiency, benzodiazepine dependence, along with urinary frequency with weakness in her legs and ambulatory dysfunction  No fevers chills nausea or vomiting  Patient also is here for annual well visit she admits she is active and eats and sleeps decently      The following portions of the patient's history were reviewed and updated as appropriate: allergies, current medications, past family history, past medical history, past social history, past surgical history and problem list     Review of Systems   Constitutional: Negative  HENT: Negative  Eyes: Negative  Respiratory: Negative  Cardiovascular: Negative  Gastrointestinal: Negative  Endocrine: Negative  Genitourinary: Positive for frequency  Allergic/Immunologic: Negative  Neurological: Negative  Hematological: Negative  Psychiatric/Behavioral: Negative  All other systems reviewed and are negative  Objective:      /58 (BP Location: Right arm, Patient Position: Sitting, Cuff Size: Large)   Pulse 96   Temp (!) 96 3 °F (35 7 °C) (Tympanic)   Ht 5' 8\" (1 727 m)   Wt 119 kg (263 lb)   SpO2 96%   BMI 39 99 kg/m²          Physical Exam  Constitutional:       Appearance: She is well-developed  HENT:      Head: Atraumatic  Right Ear: External ear normal       Left Ear: External ear normal    Eyes:      Conjunctiva/sclera: Conjunctivae normal       Pupils: Pupils are equal, round, and reactive to light  Cardiovascular:      Rate and Rhythm: Normal rate and regular rhythm  Heart sounds: Normal heart sounds  Pulmonary:      Effort: Pulmonary effort is normal  No respiratory distress  Breath sounds: Normal breath sounds  Abdominal:      General: Bowel sounds are normal  There is no distension  Palpations: Abdomen is soft  Tenderness: There is no abdominal tenderness  There is no guarding or rebound  Musculoskeletal:         General: Normal range of motion        Cervical back: Normal " range of motion  Skin:     General: Skin is warm and dry  Neurological:      Mental Status: She is alert and oriented to person, place, and time  Cranial Nerves: No cranial nerve deficit  Psychiatric:         Behavior: Behavior normal          Thought Content: Thought content normal          Judgment: Judgment normal          BMI Counseling: Body mass index is 39 99 kg/m²  The BMI is above normal  Nutrition recommendations include encouraging healthy choices of fruits and vegetables  Exercise recommendations include moderate physical activity 150 minutes/week  Rationale for BMI follow-up plan is due to patient being overweight or obese

## 2023-06-13 ENCOUNTER — HOSPITAL ENCOUNTER (OUTPATIENT)
Dept: MAMMOGRAPHY | Facility: MEDICAL CENTER | Age: 63
Discharge: HOME | End: 2023-06-13
Payer: COMMERCIAL

## 2023-06-13 ENCOUNTER — TELEPHONE (OUTPATIENT)
Dept: ADMINISTRATIVE | Facility: OTHER | Age: 63
End: 2023-06-13

## 2023-06-13 VITALS — BODY MASS INDEX: 39.76 KG/M2 | WEIGHT: 262.35 LBS | HEIGHT: 68 IN

## 2023-06-13 DIAGNOSIS — Z12.31 ENCOUNTER FOR SCREENING MAMMOGRAM FOR MALIGNANT NEOPLASM OF BREAST: ICD-10-CM

## 2023-06-13 PROCEDURE — 77063 BREAST TOMOSYNTHESIS BI: CPT

## 2023-06-13 PROCEDURE — 77067 SCR MAMMO BI INCL CAD: CPT

## 2023-06-13 NOTE — LETTER
Procedure Request Form: Colonoscopy      Date Requested: 23  Patient: Zunilda Ibanez  Patient : 1960   Referring Provider: Duard Pitch, MD        Date of Procedure ______________________________       The above patient has informed us that they have completed their   most recent Colonoscopy at your facility  Please complete   this form and attach all corresponding procedure reports/results  Comments __________________________________________________________  ____________________________________________________________________  ____________________________________________________________________  ____________________________________________________________________    Facility Completing Procedure _________________________________________    Form Completed By (print name) _______________________________________      Signature __________________________________________________________      These reports are needed for  compliance  Please fax this completed form and a copy of the procedure report to our office located at [place] [address] as soon as possible to Fax [phone] demetri Collins: Phone [phone]    We thank you for your assistance in treating our mutual patient

## 2023-06-13 NOTE — TELEPHONE ENCOUNTER
----- Message from Alexandro Shaikh sent at 6/9/2023  2:52 PM EDT -----  06/09/23 2:53 PM    Hello, our patient Carson Villatoro has had CRC: Colonoscopy completed/performed  Please assist in updating the patient chart by making an External outreach to [facility] general surgeon Dr Blake Mckeon facility located in [facility]  The date of service is 05/2022      Thank you,  Lisa Botello  [facility]

## 2023-06-13 NOTE — TELEPHONE ENCOUNTER
Upon review of the In Basket request and the patient's chart, initial outreach has been made via fax to facility  Please see Contacts section for details       Thank you  Leo Horowitz MA

## 2023-06-15 NOTE — TELEPHONE ENCOUNTER
Upon review of the In Basket request we were able to locate, review, and update the patient chart as requested for CRC: Colonoscopy  Any additional questions or concerns should be emailed to the Practice Liaisons via the appropriate education email address, please do not reply via In Basket      Thank you  Karthikeyan Hummel MA

## 2023-06-28 DIAGNOSIS — Z79.01 CHRONIC ANTICOAGULATION: ICD-10-CM

## 2023-06-28 DIAGNOSIS — I26.99 PE (PULMONARY THROMBOEMBOLISM) (HCC): Primary | ICD-10-CM

## 2023-06-28 DIAGNOSIS — I82.491 ACUTE DEEP VEIN THROMBOSIS (DVT) OF OTHER SPECIFIED VEIN OF RIGHT LOWER EXTREMITY (HCC): ICD-10-CM

## 2023-07-08 ENCOUNTER — HOSPITAL ENCOUNTER (OUTPATIENT)
Dept: CT IMAGING | Facility: HOSPITAL | Age: 63
Discharge: HOME | End: 2023-07-08
Payer: COMMERCIAL

## 2023-07-08 DIAGNOSIS — Z96.612 AFTERCARE FOLLOWING LEFT SHOULDER JOINT REPLACEMENT SURGERY: ICD-10-CM

## 2023-07-08 DIAGNOSIS — Z47.1 AFTERCARE FOLLOWING LEFT SHOULDER JOINT REPLACEMENT SURGERY: ICD-10-CM

## 2023-07-08 DIAGNOSIS — M19.012 PRIMARY OSTEOARTHRITIS OF LEFT SHOULDER: ICD-10-CM

## 2023-07-08 PROCEDURE — 73200 CT UPPER EXTREMITY W/O DYE: CPT

## 2023-07-08 PROCEDURE — G1004 CDSM NDSC: HCPCS

## 2023-07-14 ENCOUNTER — HOSPITAL ENCOUNTER (OUTPATIENT)
Dept: INFUSION CENTER | Facility: CLINIC | Age: 63
End: 2023-07-14
Payer: COMMERCIAL

## 2023-07-14 DIAGNOSIS — E53.8 B12 DEFICIENCY: Primary | ICD-10-CM

## 2023-07-14 DIAGNOSIS — K95.89 IRON DEFICIENCY ANEMIA FOLLOWING BARIATRIC SURGERY: ICD-10-CM

## 2023-07-14 DIAGNOSIS — D50.8 IRON DEFICIENCY ANEMIA FOLLOWING BARIATRIC SURGERY: ICD-10-CM

## 2023-07-14 PROCEDURE — 96372 THER/PROPH/DIAG INJ SC/IM: CPT

## 2023-07-14 RX ORDER — CYANOCOBALAMIN 1000 UG/ML
1000 INJECTION, SOLUTION INTRAMUSCULAR; SUBCUTANEOUS ONCE
OUTPATIENT
Start: 2023-07-26

## 2023-07-14 RX ORDER — CYANOCOBALAMIN 1000 UG/ML
1000 INJECTION, SOLUTION INTRAMUSCULAR; SUBCUTANEOUS ONCE
Status: COMPLETED | OUTPATIENT
Start: 2023-07-14 | End: 2023-07-14

## 2023-07-14 RX ADMIN — CYANOCOBALAMIN 1000 MCG: 1000 INJECTION, SOLUTION INTRAMUSCULAR at 10:55

## 2023-07-17 ENCOUNTER — HOSPITAL ENCOUNTER (OUTPATIENT)
Dept: NON INVASIVE DIAGNOSTICS | Facility: CLINIC | Age: 63
Discharge: HOME | End: 2023-07-17
Payer: COMMERCIAL

## 2023-07-17 DIAGNOSIS — R29.898 WEAKNESS OF BOTH LOWER EXTREMITIES: ICD-10-CM

## 2023-07-17 PROCEDURE — 93925 LOWER EXTREMITY STUDY: CPT | Performed by: SURGERY

## 2023-07-17 PROCEDURE — 93925 LOWER EXTREMITY STUDY: CPT

## 2023-07-17 PROCEDURE — 93923 UPR/LXTR ART STDY 3+ LVLS: CPT | Performed by: SURGERY

## 2023-07-17 PROCEDURE — 93923 UPR/LXTR ART STDY 3+ LVLS: CPT

## 2023-07-18 ENCOUNTER — OFFICE VISIT (OUTPATIENT)
Dept: FAMILY MEDICINE CLINIC | Facility: CLINIC | Age: 63
End: 2023-07-18
Payer: COMMERCIAL

## 2023-07-18 VITALS
OXYGEN SATURATION: 97 % | TEMPERATURE: 97.4 F | HEART RATE: 68 BPM | HEIGHT: 68 IN | SYSTOLIC BLOOD PRESSURE: 128 MMHG | DIASTOLIC BLOOD PRESSURE: 78 MMHG | BODY MASS INDEX: 39.89 KG/M2

## 2023-07-18 DIAGNOSIS — F31.9 BIPOLAR DEPRESSION (HCC): ICD-10-CM

## 2023-07-18 DIAGNOSIS — S27.322S: ICD-10-CM

## 2023-07-18 DIAGNOSIS — M79.605 PAIN IN BOTH LOWER EXTREMITIES: Primary | ICD-10-CM

## 2023-07-18 DIAGNOSIS — E27.40 ADRENAL INSUFFICIENCY (HCC): ICD-10-CM

## 2023-07-18 DIAGNOSIS — M79.604 PAIN IN BOTH LOWER EXTREMITIES: Primary | ICD-10-CM

## 2023-07-18 PROCEDURE — 99214 OFFICE O/P EST MOD 30 MIN: CPT | Performed by: FAMILY MEDICINE

## 2023-07-18 NOTE — PROGRESS NOTES
Assessment/Plan:      Diagnoses and all orders for this visit:    Pain in both lower extremities    Adrenal insufficiency (HCC)    Contusion of both lungs, sequela    Bipolar depression ([address])        Continue current therapy. She will follow-up with orthopedics. Subjective:     Patient ID: Berta Saucedo is a 57 y.o. female. She complains of pain in both lower extremities from her kneecaps down to her ankles. This pain has been present for quite a while. She did have a Doppler study yesterday that was normal.      Review of Systems   Constitutional: Negative. Respiratory: Negative. Cardiovascular: Negative. Gastrointestinal: Negative. Musculoskeletal: Positive for arthralgias, gait problem and myalgias. Objective:     Physical Exam  Vitals and nursing note reviewed. Exam conducted with a chaperone present. Constitutional:       Appearance: She is well-developed. HENT:      Head: Normocephalic and atraumatic. Eyes:      Pupils: Pupils are equal, round, and reactive to light. Neck:      Vascular: No JVD. Cardiovascular:      Rate and Rhythm: Normal rate. Pulmonary:      Effort: Pulmonary effort is normal.   Abdominal:      Palpations: Abdomen is soft. Musculoskeletal:      Cervical back: Normal range of motion. Lymphadenopathy:      Cervical: No cervical adenopathy. Neurological:      Mental Status: She is alert and oriented to person, place, and time.

## 2023-07-20 ENCOUNTER — OFFICE VISIT (OUTPATIENT)
Dept: OBGYN CLINIC | Facility: OTHER | Age: 63
End: 2023-07-20
Payer: COMMERCIAL

## 2023-07-20 VITALS
WEIGHT: 252 LBS | SYSTOLIC BLOOD PRESSURE: 135 MMHG | HEART RATE: 88 BPM | DIASTOLIC BLOOD PRESSURE: 84 MMHG | HEIGHT: 68 IN | BODY MASS INDEX: 38.19 KG/M2

## 2023-07-20 DIAGNOSIS — Z98.890 HISTORY OF REVERSE TOTAL REPLACEMENT OF LEFT SHOULDER JOINT: ICD-10-CM

## 2023-07-20 DIAGNOSIS — M19.011 PRIMARY OSTEOARTHRITIS OF RIGHT SHOULDER: Primary | ICD-10-CM

## 2023-07-20 PROCEDURE — 99215 OFFICE O/P EST HI 40 MIN: CPT | Performed by: ORTHOPAEDIC SURGERY

## 2023-07-20 NOTE — PROGRESS NOTES
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 She will continue to monitor her symptoms about this shoulder. · Her right shoulder continues to be symptomatic despite non operative treatments such as a CS injection and PT/HEP. At this time, she wishes to proceed forward with a surgical procedure for her right shoulder due to continued pain and affects on her daily activities. Her rotator cuff appears intact on examination but we will obtain a CT blue print preoperatively to further assess the rotator cuff. I feel given her lack of improvement with appropriate nonoperative care we can offer her confidently an arthroplasty for her right shoulder, whether the prosthesis is an anatomic or a reverse prosthesis we based on the preoperative imaging as well as on intraoperative assessment, given her failure of an anatomic prosthesis on the left we would certainly give consideration to a reverse prosthesis which is working well for her at this time, but again that is a decision that have been made essentially intraoperatively and the patient is okay with what ever prosthesis is the most appropriate given the intraoperative findings. · A thorough discussion was performed with the patient reviewing all operative and nonoperative options as well as the risks of the procedure. Risks discussed include but not limited to persistent pain, dislocation, loosening of the prosthesis, infection, need for further surgery including revision to a reverse total shoulder, rotator cuff rupture , neurovascular injury, as well as the risk of anesthesia.   After this discussion all questions were answered and informed consent was obtained for an Anatomic vs ReverseTotal Shoulder Arthroplasty of the right shoulder  · Follow up post operatively with Toni Koch PA-C    Subjective:   Patient ID: Shruti Siddiqui is a 57 y.o. female      Patient presents today for a follow up visit for her left shoulder as well as to discuss CT scan results. She had a conversion of anatomic to reverse TSA 1.5 years ago (12/21/21). She reports that her pain has slioghtly improved since her last visit. She still notes intermittent pain about the anterolateral aspect of the shoulder. Pain is worse with overhead and repetitive motions. Denies injury or trauma. Denies fever or chills. Desi Parker returns to the office in follow up of the right shoulder. She has osteoarthritis of this shoulder. She has responded well to steroid injections in the past but states that her last injection on 5/30/23 provided her with minimal benefit. Denies new injury or trauma. Pain occurs with motion. Pain improves with rest and Tylenol. Admits to pain that interferes with sleep.         The following portions of the patient's history were reviewed and updated as appropriate: allergies, current medications, past family history, past medical history, past social history, past surgical history and problem list.    Objective:  /84 (BP Location: Right arm, Patient Position: Sitting, Cuff Size: Adult)   Pulse 88   Ht 5' 8" (1.727 m) Comment: verbal  Wt 114 kg (252 lb) Comment: verbal  BMI 38.32 kg/m²       Right Shoulder Exam     Range of Motion   External rotation: 50   Forward flexion: 150   Internal rotation 0 degrees: Lumbar     Muscle Strength   Abduction: 5/5   External rotation: 5/5     Tests   Drop arm: negative    Other   Erythema: absent  Sensation: normal  Pulse: present      Left Shoulder Exam     Range of Motion   External rotation: 40   Forward flexion: 140     Muscle Strength   Abduction: 5/5     Other   Erythema: absent  Sensation: normal  Pulse: present Physical Exam  Constitutional:       Appearance: She is well-developed. Eyes:      Pupils: Pupils are equal, round, and reactive to light. Pulmonary:      Effort: Pulmonary effort is normal.      Breath sounds: Normal breath sounds. Skin:     General: Skin is warm and dry. Neurological:      Mental Status: She is alert and oriented to person, place, and time. Psychiatric:         Behavior: Behavior normal.         Thought Content: Thought content normal.         Judgment: Judgment normal.           I have personally reviewed pertinent films in PACS and my interpretation is as follows. CT Scan Left Shoulder: Intact reverse total shoulder arthroplasty without acute fracture or dislocation.  No signs of loosening or failure    Scribe Attestation    I,:  Po Loaiza am acting as a scribe while in the presence of the attending physician.:       I,:  Ludwig Khanna MD personally performed the services described in this documentation    as scribed in my presence.:

## 2023-07-20 NOTE — PATIENT INSTRUCTIONS
What to Expect Before and After Shoulder Replacement Surgery  You are being scheduled for a shoulder replacement by Dr. Vince Kimbrough to treat your shoulder condition. Here is some information which may help to answer questions that you may have. Please do not hesitate to reach out to our team to answer questions not addressed here. Before Surgery  You will be contacted the evening prior to your surgery to confirm the scheduled time of the procedure and when to arrive at the hospital.   Do not eat or drink anything after midnight the night before your surgery so that the anesthesia can be performed safely. If you have been fitted for a sling in the office prior to the surgery please remember to bring it to the hospital.  You will meet the anesthesiologist the morning of the surgery. The surgery is performed under a general anesthetic but they will also offer you a regional block (shot to numb the arm) to help control your post-operative pain as well as with a catheter that is left in place after the block. The catheter is connected to a small pump which will continue to provide numbing medicine and help prolong the pain control from the block. Unfortunately this catheter is not as effective as the initial block, but can still be very helpful in managing the pain. After Surgery and in the Hospital  The shoulder replacement surgery typically takes 60-90 minutes. When surgery is completed, your surgeon will update your family and friends on your condition and progress. You will remain in the recovery room for at least an hour or until the anesthesia has worn off and your blood pressure and pulse are stable. If you have pain, the nurses will give you medication. Once out of surgery, your surgeon will decide on how long you will be using a sling in order to protect and position your shoulder. However, this won't keep you from starting physical therapy.   Exercises typically begin on the day after surgery with emphasis on moving the shoulder, wrist, and hand. The physical therapist will be provided with a detailed protocol but typically the first 6 weeks are used to regain range of motion and then strengthening is initiated. Starting strengthening exercises too early may lead to complications. When You Are Discharged from the [facility] can expect to be released from the hospital the day after surgery (this may change if you have special needs or medical conditions). Before you are released, the treatment team (Orthopaedic surgery residents, physician assistants and physical therapists) will talk with you about the importance of limiting any sudden or stressful movements to the arm for several weeks or longer. Activities that involve pushing, pulling, and lifting should not be done until you are given permission from your surgeon. Your First Day at [address] [place] [address] may need help with your daily activities, so it is a good idea to have family and friends prepared to help you. It is okay to remove the sling and let the arm hang at the side so that you can get cleaned and change your clothes. To put on a shirt, place the bad arm in first and then the good arm. Reverse to take it off. Don't forget to wear the sling every night for at least the first month after surgery, and never use your arm to push yourself up in bed or from a chair. The added weight on your shoulder may cause you to re-injure the joint. How to Geraldine in the First Week  You are encouraged to return to your normal eating and sleeping patterns as soon as possible. It is important for you to be active in order to control your weight and muscle tone. It is ok to increase your activity level and even perform light aerobic exercise (like walking or riding a stationary bike) within the first week or so if you are feeling up to it.   If there is concern about these activates best to wait until the first post-operative visit and discuss this with the suzanne Goodman might be able to return to work within several days if you can perform your job while wearing a sling. Consult with your doctor, as this differs from patient to patient. However, if your job requires heavy lifting or climbing, there may be a delay for several months. Until you are seen for your first follow-up visit, please try and keep wound dry. It is okay to shower but try your best to keep the incision out of direct contact with the water (or consider using a waterproof bandage). If it does gets wet please dry as best as possible afterwards. If you notice any drainage or a foul odor from your incision or your temperature goes above 101.5 degrees, please contact the office. What You Can Expect in the First Month  Your first post-operative appointment will be with Dr Yogi Arango physician assistant (PA) around 2 weeks after the surgery. You skin staples will be removed and X-rays will be obtained at that visit. Please understand that it is quite common to still experience pain at that time but the pain should be steadily improving. Hopefully physical therapy has already begun but if not it will be initiated at this visit and will continue for the 8-12 weeks. At about 12 weeks after surgery you will start a progressive strengthening program. Physical therapy is a deliberate process of not only strengthening your shoulder but also altering how you use your arm. It may be many months before your desired results are achieved, so do not get discouraged. Your shoulder will generally continue to improve steadily up to 6-8 months after surgery. After that point further improvement is very slow; although it has been shown that even after a year or more, activity can increase as muscle strength continues to improve. After the First Month at Home   Because each person heals differently, there are different recovery timelines. An average recovery period typically lasts about between 3-6 months. Talk with your surgeon about which activities will be appropriate for you once you have recovered

## 2023-07-25 ENCOUNTER — OFFICE VISIT (OUTPATIENT)
Dept: OBGYN CLINIC | Facility: OTHER | Age: 63
End: 2023-07-25
Payer: COMMERCIAL

## 2023-07-25 VITALS
DIASTOLIC BLOOD PRESSURE: 81 MMHG | BODY MASS INDEX: 40.01 KG/M2 | WEIGHT: 264 LBS | HEIGHT: 68 IN | HEART RATE: 87 BPM | SYSTOLIC BLOOD PRESSURE: 139 MMHG

## 2023-07-25 DIAGNOSIS — M70.52 PES ANSERINUS BURSITIS OF LEFT KNEE: ICD-10-CM

## 2023-07-25 DIAGNOSIS — M17.11 PRIMARY OSTEOARTHRITIS OF RIGHT KNEE: Primary | ICD-10-CM

## 2023-07-25 PROCEDURE — 99214 OFFICE O/P EST MOD 30 MIN: CPT | Performed by: FAMILY MEDICINE

## 2023-07-25 PROCEDURE — 20610 DRAIN/INJ JOINT/BURSA W/O US: CPT | Performed by: FAMILY MEDICINE

## 2023-07-25 RX ORDER — BUPIVACAINE HYDROCHLORIDE 2.5 MG/ML
2 INJECTION, SOLUTION INFILTRATION; PERINEURAL
Status: COMPLETED | OUTPATIENT
Start: 2023-07-25 | End: 2023-07-25

## 2023-07-25 RX ORDER — BUPIVACAINE HYDROCHLORIDE 2.5 MG/ML
4 INJECTION, SOLUTION INFILTRATION; PERINEURAL
Status: COMPLETED | OUTPATIENT
Start: 2023-07-25 | End: 2023-07-25

## 2023-07-25 RX ORDER — TRIAMCINOLONE ACETONIDE 40 MG/ML
40 INJECTION, SUSPENSION INTRA-ARTICULAR; INTRAMUSCULAR
Status: COMPLETED | OUTPATIENT
Start: 2023-07-25 | End: 2023-07-25

## 2023-07-25 RX ADMIN — TRIAMCINOLONE ACETONIDE 40 MG: 40 INJECTION, SUSPENSION INTRA-ARTICULAR; INTRAMUSCULAR at 09:15

## 2023-07-25 RX ADMIN — BUPIVACAINE HYDROCHLORIDE 2 ML: 2.5 INJECTION, SOLUTION INFILTRATION; PERINEURAL at 09:15

## 2023-07-25 RX ADMIN — BUPIVACAINE HYDROCHLORIDE 4 ML: 2.5 INJECTION, SOLUTION INFILTRATION; PERINEURAL at 09:15

## 2023-07-25 NOTE — PROGRESS NOTES
1. Primary osteoarthritis of right knee        2. Pes anserinus bursitis of left knee          Orders Placed This Encounter   Procedures   • Large joint arthrocentesis: R knee   • Large joint arthrocentesis        IMAGING STUDIES: (I personally reviewed images in PACS and report):         PAST REPORTS:        ASSESSMENT/PLAN:  Right Knee OA  Left Knee TKA with Pes Anserine Bursitis  S/p Eval Dr Harinder Penaloza 2/28/23 dx with pes anserine bursitis left knee    Repeat X-ray next visit: None    Return if symptoms worsen or fail to improve. Patient Instructions   red flags and risks of injection include but are not limited to infection <0.072% as referenced in some sources, nerve or artery penetration, and if steroids are used-skin dimpling <1%, hypo-pigmentation <1%. Recommended no submerging underwater in a tub, pool, ocean, lake, jacuzzi, hot tub, or any other body of water for 1 week until needle wound closes due to risk of infection. May take showers. Clean needle site with soap and water and keep covered at all times with sterile bandage such as a band-aid until fully healed. Educated if any symptoms including fevers, chills, swelling, or worsening symptoms occur then to call office or go to hospital for immediate care if physician unavailable due to possible infection or other complication which is a serious medical problem. Patient expressed understanding and agreed to proceed with procedure.            __________________________________________________________________________    HISTORY OF PRESENT ILLNESS:  Follow-up right knee osteoarthritis: Uncontrolled. Patient did have significant relief with corticosteroid injection given 1/31/2023. At the time she was also found to have a left knee pain persistent status post knee replacement.   She was evaluated by Dr. Harinder Penaloza orthopedic replacement specialist who diagnosed patient with Pez anserine bursitis and performed landmark guided injection for pes anserine bursa which patient tells me she had significant relief. Today her left knee is also uncontrolled has some moderate to severe intensity pain 8 out of 10 per patient. The right knee is 8 out of 10 as well. Patient denies any recent falls fevers chills swelling.             Review of Systems      Following history reviewed and update:    Past Medical History:   Diagnosis Date   • Adrenal insufficiency ([place]'s disease) ([address])    • Anxiety    • Arthritis    • Bipolar disorder    • Cervical radiculopathy    • Chronic back pain    • Chronic pain disorder     lumbar   • Depression    • DVT, lower extremity ([address])     1991 and 2019 now on eliquis   • Exercises 5 to 6 times per week     5 days per week with weights/band and also goes to PT 2x/week   • Fibromyalgia    • History of Clostridioides difficile infection    • History of MRSA infection     pt denies having this   • History of recent fall 07/2021    "possibly injured left shoulder'   • History of TIAs     cannot remember details   • Hypertension    • Hypokalemia    • Left shoulder pain     "not too bad" goes to PT 2x/week   • Migraine    • Psychiatric disorder     Anxiety, major depression, bipolar   • Spinal stenosis    • Stroke [facility])     pt reports" not remembering having a stroke"   • Syncope 2014    orthostatic hypotension   • Wears dentures     upper   • Wears glasses      Past Surgical History:   Procedure Laterality Date   • APPENDECTOMY     • CAST APPLICATION Left 18/48/7329    Procedure: Application short-arm splint;  Surgeon: Rody Norris MD;  Location: [facility];  Service: Orthopedics   • COLONOSCOPY     • FL INJECTION LEFT SHOULDER (ARTHROGRAM)  11/10/2021   • GASTRIC RESTRICTION SURGERY      Gastric Sleeve Nov 2015   • HYSTERECTOMY      DONALD   • JOINT REPLACEMENT      Left Knee 2011 and Right Hip 2010   • OOPHORECTOMY     • ORIF WRIST FRACTURE Left 07/04/2020    Procedure: Open reduction and internal fixation left radius and ulnar shaft fracture; Surgeon: Monique Odonnell MD;  Location: [facility];  Service: Orthopedics   • OH ARTHROPLASTY GLENOHUMERAL JOINT TOTAL SHOULDER Left 03/30/2021    Procedure: ARTHROPLASTY SHOULDER - anatomic;  Surgeon: Aruna Anand MD;  Location: [facility];  Service: Orthopedics   • OH NATIVIDAD SHOULDER ARTHRPLSTY HUMERAL&GLENOID COMPNT Left 12/21/2021    Procedure: REVISION OF TOTAL SHOULDER ARTHROPLASTY TO REVERSE TOTAL SHOULDER ARTHROPLASTY;  Surgeon: Lili Winchester MD;  Location: [facility];  Service: Orthopedics     Social History   Social History     Substance and Sexual Activity   Alcohol Use Not Currently   • Alcohol/week: 2.0 standard drinks of alcohol   • Types: 1 Glasses of wine, 1 Standard drinks or equivalent per week    Comment: occasionally     Social History     Substance and Sexual Activity   Drug Use Never    Comment: na     Social History     Tobacco Use   Smoking Status Former   • Packs/day: 0.20   • Years: 20.00   • Total pack years: 4.00   • Types: Cigarettes   • Start date: 0   • Quit date: 2008   • Years since quitting: 15.5   Smokeless Tobacco Never   Tobacco Comments    quit     Family History   Problem Relation Age of Onset   • Breast cancer Mother 40   • Migraines Mother    • Arthritis Mother    • Kidney disease Father    • Heart disease Father    • Diabetes Father    • Arthritis Father    • Brain cancer Brother    • Seizures Brother      Allergies   Allergen Reactions   • Ketorolac Itching and Other (See Comments)     [toradol] Itching, hives   • Mushroom Extract Complex - Food Allergy Hives          Physical Exam  /81 (BP Location: Left arm, Patient Position: Sitting, Cuff Size: Large)   Pulse 87   Ht 5' 8" (1.727 m) Comment: verbal  Wt 120 kg (264 lb)   BMI 40.14 kg/m²     Constitutional:  see vital signs  Gen: well-developed, normocephalic/atraumatic, well-groomed  Eyes: No inflammation or discharge of conjunctiva or lids; sclera clear   Pharynx: no inflammation, lesion, or mass of lips  Neck: supple, no masses, non-distended  MSK: no inflammation, lesion, mass, or clubbing of nails and digits except for other than mentioned below  SKIN: no visible rashes or skin lesions  Pulmonary/Chest: Effort normal. No respiratory distress. NEURO: cranial nerves grossly intact  PSYCH:  Alert and oriented to person, place, and time; recent and remote memory intact; mood normal, no depression, anxiety, or agitation, judgment and insight good and intact     Ortho Exam  RIGHT KNEE:  Erythema: no  Swelling: no  Increased Warmth: no  Tenderness: +medial joint line  Flexion: intact  Extension: intact  Drawer: negative  Varus laxity: negative  Valgus laxity: negative       LEFT KNEE:  Erythema: no  Swelling: no  Increased Warmth: no  Tenderness: +tenderneses pes anserine  Flexion: intact  Extension: intact  Drawer: negative  Varus laxity: negative  Valgus laxity: negative  __________________________________________________________________________  Large joint arthrocentesis: R knee  Universal Protocol:  Consent: Verbal consent obtained. Risks and benefits: risks, benefits and alternatives were discussed  Consent given by: patient  Time out: Immediately prior to procedure a "time out" was called to verify the correct patient, procedure, equipment, support staff and site/side marked as required.   Patient understanding: patient states understanding of the procedure being performed  Site marked: the operative site was marked  Patient identity confirmed: verbally with patient    Supporting Documentation  Indications: pain and diagnostic evaluation   Procedure Details  Location: knee - R knee  Preparation: Patient was prepped and draped in the usual sterile fashion  Needle size: 22 G  Ultrasound guidance: no  Approach: anterolateral  Medications administered: 4 mL bupivacaine 0.25 %; 40 mg triamcinolone acetonide 40 mg/mL    Patient tolerance: patient tolerated the procedure well with no immediate complications  Dressing:  Sterile dressing applied    Large joint arthrocentesis: L anserine bursa  Universal Protocol:  Consent: Verbal consent obtained. Risks and benefits: risks, benefits and alternatives were discussed  Consent given by: patient  Time out: Immediately prior to procedure a "time out" was called to verify the correct patient, procedure, equipment, support staff and site/side marked as required. Patient understanding: patient states understanding of the procedure being performed  Site marked: the operative site was marked  Patient identity confirmed: verbally with patient    Supporting Documentation  Indications: pain   Procedure Details  Location: knee - L anserine bursa  Preparation: Patient was prepped and draped in the usual sterile fashion (betadine if not allergic.  alcohol before and after ethyl chloride spray)  Needle size: 22 G  Ultrasound guidance: no  Approach: medial  Medications administered: 2 mL bupivacaine 0.25 %; 40 mg triamcinolone acetonide 40 mg/mL    Patient tolerance: patient tolerated the procedure well with no immediate complications  Dressing:  Sterile dressing applied

## 2023-07-25 NOTE — PATIENT INSTRUCTIONS
red flags and risks of injection include but are not limited to infection <0.072% as referenced in some sources, nerve or artery penetration, and if steroids are used-skin dimpling <1%, hypo-pigmentation <1%. Recommended no submerging underwater in a tub, pool, ocean, lake, jacuzzi, hot tub, or any other body of water for 1 week until needle wound closes due to risk of infection. May take showers. Clean needle site with soap and water and keep covered at all times with sterile bandage such as a band-aid until fully healed. Educated if any symptoms including fevers, chills, swelling, or worsening symptoms occur then to call office or go to hospital for immediate care if physician unavailable due to possible infection or other complication which is a serious medical problem. Patient expressed understanding and agreed to proceed with procedure.

## 2023-07-26 ENCOUNTER — HOSPITAL ENCOUNTER (OUTPATIENT)
Dept: RADIOLOGY | Facility: HOSPITAL | Age: 63
Discharge: HOME | End: 2023-07-26
Attending: ORTHOPAEDIC SURGERY
Payer: COMMERCIAL

## 2023-07-26 DIAGNOSIS — M19.011 PRIMARY OSTEOARTHRITIS OF RIGHT SHOULDER: ICD-10-CM

## 2023-07-26 PROCEDURE — 73200 CT UPPER EXTREMITY W/O DYE: CPT

## 2023-07-26 PROCEDURE — G1004 CDSM NDSC: HCPCS

## 2023-07-27 DIAGNOSIS — I82.491 ACUTE DEEP VEIN THROMBOSIS (DVT) OF OTHER SPECIFIED VEIN OF RIGHT LOWER EXTREMITY (HCC): ICD-10-CM

## 2023-07-27 DIAGNOSIS — I26.99 PE (PULMONARY THROMBOEMBOLISM) (HCC): ICD-10-CM

## 2023-07-27 DIAGNOSIS — Z79.01 CHRONIC ANTICOAGULATION: ICD-10-CM

## 2023-07-31 ENCOUNTER — TELEPHONE (OUTPATIENT)
Dept: FAMILY MEDICINE CLINIC | Facility: CLINIC | Age: 63
End: 2023-07-31

## 2023-07-31 NOTE — TELEPHONE ENCOUNTER
Patient called and left a message stating she has itching around her body but no other symptoms. I left a message asking patient to call back to discuss symptoms more.

## 2023-08-03 ENCOUNTER — ANESTHESIA EVENT (OUTPATIENT)
Dept: PERIOP | Facility: HOSPITAL | Age: 63
End: 2023-08-03
Payer: COMMERCIAL

## 2023-08-11 ENCOUNTER — HOSPITAL ENCOUNTER (OUTPATIENT)
Dept: INFUSION CENTER | Facility: CLINIC | Age: 63
Discharge: HOME | End: 2023-08-11
Payer: COMMERCIAL

## 2023-08-11 VITALS — TEMPERATURE: 97.1 F

## 2023-08-11 DIAGNOSIS — K95.89 IRON DEFICIENCY ANEMIA FOLLOWING BARIATRIC SURGERY: ICD-10-CM

## 2023-08-11 DIAGNOSIS — D50.8 IRON DEFICIENCY ANEMIA FOLLOWING BARIATRIC SURGERY: ICD-10-CM

## 2023-08-11 DIAGNOSIS — G47.00 INSOMNIA, UNSPECIFIED TYPE: Primary | ICD-10-CM

## 2023-08-11 DIAGNOSIS — E53.8 B12 DEFICIENCY: Primary | ICD-10-CM

## 2023-08-11 PROBLEM — Z00.00 ROUTINE ADULT HEALTH MAINTENANCE: Status: RESOLVED | Noted: 2023-06-12 | Resolved: 2023-08-11

## 2023-08-11 PROCEDURE — 96372 THER/PROPH/DIAG INJ SC/IM: CPT

## 2023-08-11 RX ORDER — CYANOCOBALAMIN 1000 UG/ML
1000 INJECTION, SOLUTION INTRAMUSCULAR; SUBCUTANEOUS ONCE
Status: COMPLETED | OUTPATIENT
Start: 2023-08-11 | End: 2023-08-11

## 2023-08-11 RX ORDER — CYANOCOBALAMIN 1000 UG/ML
1000 INJECTION, SOLUTION INTRAMUSCULAR; SUBCUTANEOUS ONCE
OUTPATIENT
Start: 2023-09-08

## 2023-08-11 RX ORDER — HYDROXYZINE HYDROCHLORIDE 25 MG/1
25 TABLET, FILM COATED ORAL
Qty: 30 TABLET | Refills: 0 | Status: SHIPPED | OUTPATIENT
Start: 2023-08-11

## 2023-08-11 RX ADMIN — CYANOCOBALAMIN 1000 MCG: 1000 INJECTION, SOLUTION INTRAMUSCULAR at 11:14

## 2023-08-16 ENCOUNTER — TELEPHONE (OUTPATIENT)
Dept: FAMILY MEDICINE CLINIC | Facility: CLINIC | Age: 63
End: 2023-08-16

## 2023-08-17 ENCOUNTER — TELEPHONE (OUTPATIENT)
Dept: HEMATOLOGY ONCOLOGY | Facility: CLINIC | Age: 63
End: 2023-08-17

## 2023-08-17 ENCOUNTER — DOCUMENTATION (OUTPATIENT)
Dept: CASE MANAGEMENT | Facility: OTHER | Age: 63
End: 2023-08-17

## 2023-08-17 NOTE — TELEPHONE ENCOUNTER
Ok to try 2 tabs qHS but if not improving recommend office visit to discuss further or referral to sleep specialist

## 2023-08-17 NOTE — MEDICAL HIGH UTILIZER
[facility] for: Nika Etienne  Patient Name: Nika Etienne          MRN: [id number]       : 1960 Age: 57      Sex:  F   Utilization Background: She is a female with a history of migraine, [place]’s disease, Bipolar, Depression, Fibromyalgia, and HTN who presents to [facility] ([facility] and [facility] with migraine. She has 14 ER visits, 9 admission, and 2 transfers to [facility] [place] for Ketamine Infusions in 2019. Discussed with Neurology reveals that patient does not feel much better even after prolonged hospitalization.     In the last 90 days: 0 encounters     Treatment Recommendations:  ED Recommendations: Recommendations as per neurology: Give migraine protocol: using steroid protocol d/t toradol allergy. Recommend calling her [facility] Neurologist to schedule close outpatient follow up. It is noted that the patient will provide other complaints to the ER providers to get admitted and then will complain of migraine in the hospital.   Would not offer transfer for Ketamine Infusion to this patient as it has not worked in the past. This should be left up to Neurology to determine if this is appropriate.        Inpatient Recommendations: Early consultation with neurology. Avoid narcotics/sedating agents due to over sedation in the past         Outpatient recommendations: Needs close follow up with [facility]. Needs CM to make sure that patient does not run out of her meds as she has in the past.    Situation: Patient who presents frequently for migraines. It seems that she has started using other chief complaints to get admitted to the hospital for migraine treatment.  As per neurology colleague her headache symptomatology does not usually change with treatment        PMH/PSH:  Migraine, Depression, Bipolar, Fibromyalgia, HTN, Hx of DVT      Assessment                              Drivers of repeated utilization:                 Symptomatology      Community Resources in place:    None - she has mentioned that she does not have a  for infusions  May need assistance with transportation and with medications   Patient Care Team:   Zac Dee MD - PCP [facility])  Mindy Mcneil CRNP/Luigi Phillips MD - Neurology [facility])  Lauren Webb MD - Hematology [facility])  OP Care Manager: Abran Dubin, RN                  Care plan date and owner: Carmelina Chong. Rodrigue Goodman PA-C 10/31/2019         Reviewed with patient before discharge?

## 2023-08-17 NOTE — TELEPHONE ENCOUNTER
Called and left a voice message stating that there are labs that should be collected prior to appointment with Anatoly Cedeño. Mentioned that the orders are already in the system as long as patient goes to a [facility] there is no paper work needed. Stated that if for some reason patient is unable to have labs collected to please give the office a call back.

## 2023-08-18 ENCOUNTER — APPOINTMENT (OUTPATIENT)
Dept: LAB | Facility: MEDICAL CENTER | Age: 63
End: 2023-08-18
Payer: COMMERCIAL

## 2023-08-18 DIAGNOSIS — I82.491 ACUTE DEEP VEIN THROMBOSIS (DVT) OF OTHER SPECIFIED VEIN OF RIGHT LOWER EXTREMITY (HCC): ICD-10-CM

## 2023-08-18 DIAGNOSIS — D72.829 LEUKOCYTOSIS, UNSPECIFIED TYPE: ICD-10-CM

## 2023-08-18 DIAGNOSIS — D64.9 NORMOCYTIC ANEMIA: ICD-10-CM

## 2023-08-18 DIAGNOSIS — D75.839 THROMBOCYTOSIS: ICD-10-CM

## 2023-08-18 DIAGNOSIS — Z79.01 CHRONIC ANTICOAGULATION: ICD-10-CM

## 2023-08-18 DIAGNOSIS — D50.8 IRON DEFICIENCY ANEMIA FOLLOWING BARIATRIC SURGERY: ICD-10-CM

## 2023-08-18 DIAGNOSIS — K95.89 IRON DEFICIENCY ANEMIA FOLLOWING BARIATRIC SURGERY: ICD-10-CM

## 2023-08-18 DIAGNOSIS — E53.8 B12 DEFICIENCY: ICD-10-CM

## 2023-08-18 LAB
ALBUMIN SERPL BCP-MCNC: 3.5 G/DL (ref 3.5–5)
ALP SERPL-CCNC: 156 U/L (ref 46–116)
ALT SERPL W P-5'-P-CCNC: 23 U/L (ref 12–78)
ANION GAP SERPL CALCULATED.3IONS-SCNC: 6 MMOL/L
AST SERPL W P-5'-P-CCNC: 21 U/L (ref 5–45)
BASOPHILS # BLD AUTO: 0.07 THOUSANDS/ÂΜL (ref 0–0.1)
BASOPHILS NFR BLD AUTO: 1 % (ref 0–1)
BILIRUB SERPL-MCNC: 0.71 MG/DL (ref 0.2–1)
BUN SERPL-MCNC: 8 MG/DL (ref 5–25)
CALCIUM SERPL-MCNC: 9.5 MG/DL (ref 8.3–10.1)
CHLORIDE SERPL-SCNC: 103 MMOL/L (ref 96–108)
CO2 SERPL-SCNC: 27 MMOL/L (ref 21–32)
CREAT SERPL-MCNC: 0.88 MG/DL (ref 0.6–1.3)
EOSINOPHIL # BLD AUTO: 0.1 THOUSAND/ÂΜL (ref 0–0.61)
EOSINOPHIL NFR BLD AUTO: 1 % (ref 0–6)
ERYTHROCYTE [DISTWIDTH] IN BLOOD BY AUTOMATED COUNT: 13.9 % (ref 11.6–15.1)
FERRITIN SERPL-MCNC: 56 NG/ML (ref 11–307)
FOLATE SERPL-MCNC: 9 NG/ML
GFR SERPL CREATININE-BSD FRML MDRD: 69 ML/MIN/1.73SQ M
GLUCOSE P FAST SERPL-MCNC: 101 MG/DL (ref 65–99)
HCT VFR BLD AUTO: 40.7 % (ref 34.8–46.1)
HGB BLD-MCNC: 13.6 G/DL (ref 11.5–15.4)
IMM GRANULOCYTES # BLD AUTO: 0.04 THOUSAND/UL (ref 0–0.2)
IMM GRANULOCYTES NFR BLD AUTO: 0 % (ref 0–2)
IRON SATN MFR SERPL: 30 % (ref 15–50)
IRON SERPL-MCNC: 111 UG/DL (ref 50–170)
LYMPHOCYTES # BLD AUTO: 1.84 THOUSANDS/ÂΜL (ref 0.6–4.47)
LYMPHOCYTES NFR BLD AUTO: 18 % (ref 14–44)
MCH RBC QN AUTO: 30 PG (ref 26.8–34.3)
MCHC RBC AUTO-ENTMCNC: 33.4 G/DL (ref 31.4–37.4)
MCV RBC AUTO: 90 FL (ref 82–98)
MONOCYTES # BLD AUTO: 0.96 THOUSAND/ÂΜL (ref 0.17–1.22)
MONOCYTES NFR BLD AUTO: 9 % (ref 4–12)
NEUTROPHILS # BLD AUTO: 7.22 THOUSANDS/ÂΜL (ref 1.85–7.62)
NEUTS SEG NFR BLD AUTO: 71 % (ref 43–75)
NRBC BLD AUTO-RTO: 0 /100 WBCS
PLATELET # BLD AUTO: 401 THOUSANDS/UL (ref 149–390)
PMV BLD AUTO: 9.7 FL (ref 8.9–12.7)
POTASSIUM SERPL-SCNC: 4.3 MMOL/L (ref 3.5–5.3)
PROT SERPL-MCNC: 7 G/DL (ref 6.4–8.4)
RBC # BLD AUTO: 4.53 MILLION/UL (ref 3.81–5.12)
SODIUM SERPL-SCNC: 136 MMOL/L (ref 135–147)
TIBC SERPL-MCNC: 373 UG/DL (ref 250–450)
VIT B12 SERPL-MCNC: 779 PG/ML (ref 180–914)
WBC # BLD AUTO: 10.23 THOUSAND/UL (ref 4.31–10.16)

## 2023-08-18 PROCEDURE — 36415 COLL VENOUS BLD VENIPUNCTURE: CPT

## 2023-08-18 PROCEDURE — 82607 VITAMIN B-12: CPT

## 2023-08-18 PROCEDURE — 82746 ASSAY OF FOLIC ACID SERUM: CPT

## 2023-08-18 PROCEDURE — 83550 IRON BINDING TEST: CPT

## 2023-08-18 PROCEDURE — 83918 ORGANIC ACIDS TOTAL QUANT: CPT

## 2023-08-18 PROCEDURE — 85025 COMPLETE CBC W/AUTO DIFF WBC: CPT

## 2023-08-18 PROCEDURE — 80053 COMPREHEN METABOLIC PANEL: CPT

## 2023-08-18 PROCEDURE — 83540 ASSAY OF IRON: CPT

## 2023-08-18 PROCEDURE — 82728 ASSAY OF FERRITIN: CPT

## 2023-08-21 ENCOUNTER — OFFICE VISIT (OUTPATIENT)
Dept: HEMATOLOGY ONCOLOGY | Facility: CLINIC | Age: 63
End: 2023-08-21
Payer: COMMERCIAL

## 2023-08-21 VITALS
DIASTOLIC BLOOD PRESSURE: 92 MMHG | WEIGHT: 251 LBS | OXYGEN SATURATION: 96 % | BODY MASS INDEX: 38.16 KG/M2 | HEART RATE: 106 BPM | TEMPERATURE: 97.6 F | SYSTOLIC BLOOD PRESSURE: 172 MMHG

## 2023-08-21 DIAGNOSIS — Z79.01 CHRONIC ANTICOAGULATION: Primary | ICD-10-CM

## 2023-08-21 DIAGNOSIS — I26.99 PE (PULMONARY THROMBOEMBOLISM) (HCC): ICD-10-CM

## 2023-08-21 DIAGNOSIS — D50.8 IRON DEFICIENCY ANEMIA FOLLOWING BARIATRIC SURGERY: ICD-10-CM

## 2023-08-21 DIAGNOSIS — D72.829 LEUKOCYTOSIS, UNSPECIFIED TYPE: ICD-10-CM

## 2023-08-21 DIAGNOSIS — D75.839 THROMBOCYTOSIS: ICD-10-CM

## 2023-08-21 DIAGNOSIS — E53.8 B12 DEFICIENCY: ICD-10-CM

## 2023-08-21 DIAGNOSIS — I82.491 ACUTE DEEP VEIN THROMBOSIS (DVT) OF OTHER SPECIFIED VEIN OF RIGHT LOWER EXTREMITY (HCC): ICD-10-CM

## 2023-08-21 DIAGNOSIS — D64.9 NORMOCYTIC ANEMIA: ICD-10-CM

## 2023-08-21 DIAGNOSIS — K95.89 IRON DEFICIENCY ANEMIA FOLLOWING BARIATRIC SURGERY: ICD-10-CM

## 2023-08-21 PROCEDURE — 99214 OFFICE O/P EST MOD 30 MIN: CPT

## 2023-08-21 NOTE — PROGRESS NOTES
[address] [facility] [id number]  Hematology Ambulatory Follow-Up  Clearance Jm, 1960, [id number]  8/21/2023    Assessment/Plan:  1. Chronic anticoagulation  2. PE (pulmonary thromboembolism) ([address])  3. Acute deep vein thrombosis (DVT) of other specified vein of right lower extremity [facility])  Ms. Elisabeth Carballo is a 56-year-old female seen in follow-up anemia due to iron and B12 deficiency. See below for recommendations. She also has a history of DVT with PE and on lifelong anticoagulation. She recently was switched to Eliquis due to insurance coverage and tolerating this well without excess bleeding or bruising. She is scheduled to undergo shoulder replacement surgery later this year. I instructed her to hold anticoagulation 2 days prior to surgery and resume on postop day 1. We will sign hematology clearance forms for orthopedics if necessary. Refills provided for her today. She understands that this will be an indefinite/lifelong medication and ER precautions were reviewed. - apixaban (Eliquis) 5 mg; Take 1 tablet (5 mg total) by mouth 2 (two) times a day  Dispense: 180 tablet; Refill: 3    4. Normocytic anemia  5. Iron deficiency anemia following bariatric surgery  6. B12 deficiency  Hemoglobin remains resolved with a hemoglobin of 13.6. Ferritin adequate at 56 with an iron saturation of 30%. B12 also has been maintained at 779 with monthly B12 injections. We will continue with current regiment and she will continue with monthly B12 injections. She will repeat labs in 6 months prior to follow-up with me in the office. She knows to call the office if her symptoms worsen for sooner evaluation prior to this. - CBC and differential; Future  - Iron Panel (Includes Ferritin, Iron Sat%, Iron, and TIBC); Future  - Folate; Future  - Vitamin B12; Future    7. Leukocytosis, unspecified type  8.  Thrombocytosis  Despite adequate supplementation resolution of anemia her platelet count remains mildly elevated at 401 along with white blood cell count 10.23 with normal differential.  I explained further work-up to include JAK2 testing. She will wait for insurance authorization to approve this and have the labs drawn at her earliest convenience. I will call/message her with the results and if any further recommendations are necessary at that time. The patient is scheduled for follow-up in approximately 6 months. Patient voiced agreement and understanding to the above. Patient knows to call the Hematology/Oncology office with any questions and concerns regarding the above. Barrier(s) to care: None  The patient is able to self care.  ------------------------------------------------------------------------------------------------------    Chief Complaint   Patient presents with   • Follow-up       History of present illness:   Raquel Matos is a 76-year-old female seen in follow-up for iron deficiency and B12 deficiency anemia. She was originally seen in consultation in October 2022 after she transitioned her care from [facility]. She states that she had a DVT and PE in 2018 and was treated with Eliquis and recently switched to Xarelto due to insurance coverage indefinitely. Mary Palmer is tolerating this well without any excess bleeding or bruising. She has longstanding history of iron deficiency due to gastric bypass, gastric sleeve in 2018.   She is unable to tolerate oral iron due to constipation.     11/30/2018:  Hemoglobin 11.3, MCV 79, platelets 351, SDQ 8.7  04/11/2019:  Hemoglobin 8.7, MCV 82, platelets 364, WBC 8.45              Ferritin 12, serum iron 27, TIBC 396  12/06/2021:  Hemoglobin 10.9, MCV 87, platelets 817, WBC 46.55              Ferritin 15, serum iron 30, TIBC 501  09/15/2022:  Hemoglobin 12.1, MCV 82, platelets 026, WBC 34.35              Ferritin 9, serum iron 36, TIBC 544  10/11/2022: Hemoglobin 11, MCV 84, platelets 778, WBC 07.70              Ferritin 10, iron saturation 5%, TIBC 495, serum iron 27              B12 320, , folate 15.1  Venofer 200 mg x6 (pepcid, benadryl, and decadron): 10/21-11/25 2/17/2023: Hemoglobin 13, MCV 91, platelets 623, WBC 43.94              Ferritin 155, iron saturation 31%, TIBC 4 1, serum iron 126              B12 624, MMA pending  8/18/2023: Hemoglobin 13.6, MCV 99 platelets 4 1, WBC 49.23   Ferritin 56, iron saturation 30%, TIBC 373, serum iron 111   B12 779, folate 9    Interval history: Since her last visit she did have new insurance coverage and is now covered with Eliquis which she was transition to. She is tolerating Eliquis 5 mg twice daily well without excess bleeding or bruising. She is also been receiving B12 injections monthly which she is also tolerating. She has no complaints or concerns today and overall feeling well. Review of Systems   Constitutional: Negative for activity change, appetite change, diaphoresis, fatigue, fever and unexpected weight change. HENT: Negative for trouble swallowing and voice change. Eyes: Negative for photophobia and visual disturbance. Respiratory: Negative for cough, chest tightness and shortness of breath. Cardiovascular: Negative for chest pain, palpitations and leg swelling. Gastrointestinal: Negative for abdominal distention, abdominal pain, anal bleeding, blood in stool, constipation, diarrhea, nausea and vomiting. Endocrine: Negative for cold intolerance and heat intolerance. Genitourinary: Negative for dysuria, hematuria, urgency and vaginal bleeding. Musculoskeletal: Negative for back pain, gait problem and joint swelling. Skin: Negative for pallor and rash. Neurological: Negative for dizziness, weakness, light-headedness and headaches. Hematological: Negative for adenopathy. Bruises/bleeds easily. Psychiatric/Behavioral: Negative for confusion and sleep disturbance.        Patient Active Problem List   Diagnosis   • Bipolar depression ([address])   • Hypokalemia   • Vertigo   • Fibromyalgia   • Spinal stenosis of lumbar region   • Osteoarthritis of lumbosacral spine without myelopathy   • Migraine   • HLD (hyperlipidemia)   • Syncope   • Orthostatic hypotension   • History of TIAs   • History of migraine   • Neck pain   • Low back pain   • PE (pulmonary thromboembolism) ([facility])   • Hypertension   • Bipolar II disorder ([facility])   • Chronic back pain   • DVT, lower extremity ([facility])   • Anxiety   • Leukocytosis   • Chronic anticoagulation   • Anemia   • Ambulatory dysfunction   • Edema   • Right arm pain   • Dizziness   • Intractable migraine without aura and without status migrainosus   • Closed head injury with brief loss of consciousness ([address])   • Shoulder pain   • Class 2 obesity due to excess calories with body mass index (BMI) of 35.0 to 35.9 in adult   • Closed fracture of distal ends of left radius and ulna   • Fall down stairs   • Fracture of multiple ribs of right side   • Hematoma of parietal scalp   • Bilateral pulmonary contusion   • History of anxiety   • History of pulmonary embolism   • Right distal ulnar fracture   • Primary osteoarthritis of left shoulder   • Status post total replacement of left shoulder   • Contusion of right hand   • Alkalosis   • Osteoarthritis of right glenohumeral joint   • S/P reverse total shoulder arthroplasty, left   • Aftercare following left shoulder joint replacement surgery   • Iron deficiency anemia following bariatric surgery   • B12 deficiency   • Adrenal insufficiency ([facility])   • Bilateral occipital neuralgia   • Closed fracture of styloid process of radius   • Fall   • Fatigue   • Anticoagulated by anticoagulation treatment   • History of DVT (deep vein thrombosis)   • History of peptic ulcer disease   • Hyperglycemia   • Hypersomnia   • Intractable vomiting with nausea   • Lower extremity edema   • Lower leg DVT (deep venous thromboembolism), acute, right ([facility]) • Lumbar spondylosis   • Migraine aura, persistent   • Mild episode of recurrent major depressive disorder (HCC)   • Myofascial pain   • Orthostatic hypertension   • Peripheral neuropathy   • Recurrent falls   • Right hip pain   • Subclinical hyperthyroidism   • Thoracic or lumbosacral neuritis or radiculitis   • Weakness of left lower extremity   • Benzodiazepine dependence (HCC)   • Weakness of both lower extremities   • Postmenopausal       Past Medical History:   Diagnosis Date   • Adrenal insufficiency (Wingdale's disease) ([address])    • Anxiety    • Arthritis    • Bipolar disorder    • Cervical radiculopathy    • Chronic back pain    • Chronic pain disorder     lumbar   • Depression    • DVT, lower extremity ([address])     1991 and 2019 now on eliquis   • Exercises 5 to 6 times per week     5 days per week with weights/band and also goes to PT 2x/week   • Fibromyalgia    • History of Clostridioides difficile infection    • History of MRSA infection     pt denies having this   • History of recent fall 07/2021    "possibly injured left shoulder'   • History of TIAs     cannot remember details   • Hypertension    • Hypokalemia    • Left shoulder pain     "not too bad" goes to PT 2x/week   • Migraine    • Psychiatric disorder     Anxiety, major depression, bipolar   • Spinal stenosis    • Stroke [facility])     pt reports" not remembering having a stroke"   • Syncope 2014    orthostatic hypotension   • Wears dentures     upper   • Wears glasses        Past Surgical History:   Procedure Laterality Date   • APPENDECTOMY     • CAST APPLICATION Left 45/62/0205    Procedure: Application short-arm splint;  Surgeon: Samuel Barbosa MD;  Location: [facility];  Service: Orthopedics   • COLONOSCOPY     • FL INJECTION LEFT SHOULDER (ARTHROGRAM)  11/10/2021   • GASTRIC RESTRICTION SURGERY      Gastric Sleeve Nov 2015   • HYSTERECTOMY      DONALD   • JOINT REPLACEMENT      Left Knee 2011 and Right Hip 2010   • OOPHORECTOMY     • ORIF WRIST FRACTURE Left 07/04/2020    Procedure: Open reduction and internal fixation left radius and ulnar shaft fracture;  Surgeon: Makayla Oro MD;  Location: [facility];  Service: Orthopedics   • NE ARTHROPLASTY GLENOHUMERAL JOINT TOTAL SHOULDER Left 03/30/2021    Procedure: ARTHROPLASTY SHOULDER - anatomic;  Surgeon: Giselle Glover MD;  Location: [facility];  Service: Orthopedics   • NE [place]IVID[place] SHOULDER ARTHRPLSTY HUMERAL&GLENOID COMPNT Left 12/21/2021    Procedure: REVISION OF TOTAL SHOULDER ARTHROPLASTY TO REVERSE TOTAL SHOULDER ARTHROPLASTY;  Surgeon: Joni Kearns MD;  Location: [facility];  Service: Orthopedics       Family History   Problem Relation Age of Onset   • Breast cancer Mother 40   • Migraines Mother    • Arthritis Mother    • Kidney disease Father    • Heart disease Father    • Diabetes Father    • Arthritis Father    • Brain cancer Brother    • Seizures Brother        Social History     Socioeconomic History   • Marital status: /Civil Union     Spouse name: None   • Number of children: None   • Years of education: None   • Highest education level: None   Occupational History   • None   Tobacco Use   • Smoking status: Former     Packs/day: 0.20     Years: 20.00     Total pack years: 4.00     Types: Cigarettes     Start date: 0     Quit date: 2008     Years since quitting: 15.6   • Smokeless tobacco: Never   • Tobacco comments:     quit   Vaping Use   • Vaping Use: Never used   Substance and Sexual Activity   • Alcohol use: Not Currently     Alcohol/week: 2.0 standard drinks of alcohol     Types: 1 Glasses of wine, 1 Standard drinks or equivalent per week     Comment: occasionally   • Drug use: Never     Comment: na   • Sexual activity: None     Comment: defer   Other Topics Concern   • None   Social History Narrative   • None     Social Determinants of Health     Financial Resource Strain: Not on file   Food Insecurity: Not on file   Transportation Needs: Not on file   Physical Activity: Not on file   Stress: Not on file   Social Connections: Not on file   Intimate Partner Violence: Not on file   Housing Stability: Not on file         Current Outpatient Medications:   •  amLODIPine (NORVASC) 5 mg tablet, Take 5 mg by mouth daily, Disp: , Rfl:   •  apixaban (Eliquis) 5 mg, Take 1 tablet (5 mg total) by mouth 2 (two) times a day, Disp: 60 tablet, Rfl: 0  •  ARIPiprazole (ABILIFY) 5 mg tablet, TAKE 1 TABLET EVERY MORNING (CORRECTION BY DR OF PREV SCRIPT), Disp: , Rfl:   •  atorvastatin (LIPITOR) 40 mg tablet, TAKE 1 TABLET BY MOUTH EVERY DAY AT NIGHT, Disp: , Rfl:   •  B-D INSULIN SYRINGE 1CC/25GX1" 25G X 1" 1 ML MISC, USE WITH DHE, Disp: , Rfl:   •  dihydroergotamine (DHE) 1 mg/mL, INJECT 1 ML (1 MG TOTAL) INJECT INTO THE MUSCLE AS NEEDED FOR MIGRAINE., Disp: , Rfl:   •  furosemide (LASIX) 20 mg tablet, Take 20 mg by mouth as needed Pt reports calls her [facility] cardiologist Dr Martha Purvis before taking , Disp: , Rfl:   •  gabapentin (NEURONTIN) 600 MG tablet, Take 600 mg by mouth 3 (three) times a day, Disp: , Rfl:   •  hydrOXYzine HCL (ATARAX) 25 mg tablet, Take 1 tablet (25 mg total) by mouth daily at bedtime as needed (insomnia), Disp: 30 tablet, Rfl: 0  •  lamoTRIgine (LaMICtal) 200 MG tablet, Take 200 mg by mouth daily. , Disp: , Rfl:   •  meclizine (ANTIVERT) 12.5 MG tablet, Take 1 tablet (12.5 mg total) by mouth every 8 (eight) hours as needed for dizziness for up to 7 days, Disp: 21 tablet, Rfl: 0  •  methocarbamol (ROBAXIN) 750 mg tablet, TAKE 1 TABLET BY MOUTH 3 TIMES A DAY AS NEEDED FOR MUSCLE SPASMS., Disp: , Rfl:   •  ondansetron (ZOFRAN-ODT) 4 mg disintegrating tablet, TAKE 1 TABLET BY MOUTH EVERY 8 HOURS AS NEEDED FOR NAUSEA AND VOMITING, Disp: , Rfl:   •  potassium chloride (K-DUR,KLOR-CON) 20 mEq tablet, Take 20 mEq by mouth daily  , Disp: , Rfl:   •  Trudhesa 0.725 MG/ACT AERS, , Disp: , Rfl:   •  Ubrelvy 100 MG tablet, TAKE 1 TABLET TWICE A DAY AS NEEDED FOR HEADACHE, LIMIT 16 TABS PER MONTH, Disp: , Rfl:   •  cholecalciferol (VITAMIN D3) 1,000 units tablet, , Disp: , Rfl:     Allergies   Allergen Reactions   • Ketorolac Itching and Other (See Comments)     [toradol] Itching, hives   • Mushroom Extract Complex - Food Allergy Hives       Objective:  BP (!) 172/92 (BP Location: Right arm, Patient Position: Sitting, Cuff Size: Large)   Pulse (!) 106   Temp 97.6 °F (36.4 °C) (Temporal)   Wt 114 kg (251 lb)   SpO2 96%   BMI 38.16 kg/m²    Physical Exam  Constitutional:       General: She is not in acute distress. Appearance: Normal appearance. She is not ill-appearing. HENT:      Head: Normocephalic and atraumatic. Eyes:      Extraocular Movements: Extraocular movements intact. Conjunctiva/sclera: Conjunctivae normal.   Cardiovascular:      Rate and Rhythm: Normal rate and regular rhythm. Pulses: Normal pulses. Heart sounds: Normal heart sounds. No murmur heard. Pulmonary:      Effort: Pulmonary effort is normal. No respiratory distress. Breath sounds: Normal breath sounds. Abdominal:      General: Bowel sounds are normal. There is no distension. Palpations: Abdomen is soft. Tenderness: There is no abdominal tenderness. Musculoskeletal:         General: Normal range of motion. Cervical back: Normal range of motion. No tenderness. Right lower leg: No edema. Left lower leg: No edema. Lymphadenopathy:      Cervical: No cervical adenopathy. Skin:     General: Skin is warm and dry. Capillary Refill: Capillary refill takes less than 2 seconds. Coloration: Skin is not pale. Findings: No bruising or lesion. Neurological:      General: No focal deficit present. Mental Status: She is alert and oriented to person, place, and time. Mental status is at baseline. Motor: No weakness.       Gait: Gait normal.   Psychiatric:         Mood and Affect: Mood normal.         Behavior: Behavior normal.         Thought Content: Thought content normal.         Judgment: Judgment normal.         Result Review  Labs:  Appointment on 08/18/2023   Component Date Value Ref Range Status   • WBC 08/18/2023 10.23 (H)  4.31 - 10.16 Thousand/uL Final   • RBC 08/18/2023 4.53  3.81 - 5.12 Million/uL Final   • Hemoglobin 08/18/2023 13.6  11.5 - 15.4 g/dL Final   • Hematocrit 08/18/2023 40.7  34.8 - 46.1 % Final   • MCV 08/18/2023 90  82 - 98 fL Final   • MCH 08/18/2023 30.0  26.8 - 34.3 pg Final   • MCHC 08/18/2023 33.4  31.4 - 37.4 g/dL Final   • RDW 08/18/2023 13.9  11.6 - 15.1 % Final   • MPV 08/18/2023 9.7  8.9 - 12.7 fL Final   • Platelets 33/86/3482 401 (H)  149 - 390 Thousands/uL Final   • nRBC 08/18/2023 0  /100 WBCs Final   • Neutrophils Relative 08/18/2023 71  43 - 75 % Final   • Immat GRANS % 08/18/2023 0  0 - 2 % Final   • Lymphocytes Relative 08/18/2023 18  14 - 44 % Final   • Monocytes Relative 08/18/2023 9  4 - 12 % Final   • Eosinophils Relative 08/18/2023 1  0 - 6 % Final   • Basophils Relative 08/18/2023 1  0 - 1 % Final   • Neutrophils Absolute 08/18/2023 7.22  1.85 - 7.62 Thousands/µL Final   • Immature Grans Absolute 08/18/2023 0.04  0.00 - 0.20 Thousand/uL Final   • Lymphocytes Absolute 08/18/2023 1.84  0.60 - 4.47 Thousands/µL Final   • Monocytes Absolute 08/18/2023 0.96  0.17 - 1.22 Thousand/µL Final   • Eosinophils Absolute 08/18/2023 0.10  0.00 - 0.61 Thousand/µL Final   • Basophils Absolute 08/18/2023 0.07  0.00 - 0.10 Thousands/µL Final   • Sodium 08/18/2023 136  135 - 147 mmol/L Final   • Potassium 08/18/2023 4.3  3.5 - 5.3 mmol/L Final   • Chloride 08/18/2023 103  96 - 108 mmol/L Final   • CO2 08/18/2023 27  21 - 32 mmol/L Final   • ANION GAP 08/18/2023 6  mmol/L Final   • BUN 08/18/2023 8  5 - 25 mg/dL Final   • Creatinine 08/18/2023 0.88  0.60 - 1.30 mg/dL Final    Standardized to IDMS reference method   • Glucose, Fasting 08/18/2023 101 (H)  65 - 99 mg/dL Final    Specimen collection should occur prior to Sulfasalazine administration due to the potential for falsely depressed results. Specimen collection should occur prior to Sulfapyridine administration due to the potential for falsely elevated results. • Calcium 08/18/2023 9.5  8.3 - 10.1 mg/dL Final   • AST 08/18/2023 21  5 - 45 U/L Final    Specimen collection should occur prior to Sulfasalazine administration due to the potential for falsely depressed results. • ALT 08/18/2023 23  12 - 78 U/L Final    Specimen collection should occur prior to Sulfasalazine and/or Sulfapyridine administration due to the potential for falsely depressed results. • Alkaline Phosphatase 08/18/2023 156 (H)  46 - 116 U/L Final   • Total Protein 08/18/2023 7.0  6.4 - 8.4 g/dL Final   • Albumin 08/18/2023 3.5  3.5 - 5.0 g/dL Final   • Total Bilirubin 08/18/2023 0.71  0.20 - 1.00 mg/dL Final    Use of this assay is not recommended for patients undergoing treatment with eltrombopag due to the potential for falsely elevated results. • eGFR 08/18/2023 69  ml/min/1.73sq m Final   • Folate 08/18/2023 9.0  >5.9 ng/mL Final    The World Health Organization has determined deficient folate concentrations are considered to be <4.0 ng/mL. • Vitamin B-12 08/18/2023 779  180 - 914 pg/mL Final   • Iron Saturation 08/18/2023 30  15 - 50 % Final   • TIBC 08/18/2023 373  250 - 450 ug/dL Final   • Iron 08/18/2023 111  50 - 170 ug/dL Final    Patients treated with metal-binding drugs (ie. Deferoxamine) may have depressed iron values. • Ferritin 08/18/2023 56  11 - 307 ng/mL Final       Imaging:   No relevant imaging to review     Please note: This report has been generated by a voice recognition software system. Therefore there may be syntax, spelling, and/or grammatical errors. Please call if you have any questions.

## 2023-08-22 ENCOUNTER — PATIENT OUTREACH (OUTPATIENT)
Dept: CASE MANAGEMENT | Facility: OTHER | Age: 63
End: 2023-08-22

## 2023-08-22 LAB — METHYLMALONATE SERPL-SCNC: 192 NMOL/L (ref 0–378)

## 2023-08-23 NOTE — PROGRESS NOTES
In basket reminder received to review chart. Patient has medical high utilizer plan. Unfortunately, patient has opted out of sharing medical records, therefore unable to see non [facility] records. Last known utilization in chart: 12/21/2021. Patient seemingly compliant with attending scheduled appts and using [employer] message to communicate with providers. Will continue to follow in surveillance. In basket reminder sent to review chart 90 days from today.

## 2023-08-25 ENCOUNTER — DOCUMENTATION (OUTPATIENT)
Dept: CASE MANAGEMENT | Facility: OTHER | Age: 63
End: 2023-08-25

## 2023-08-25 NOTE — MEDICAL HIGH UTILIZER
Andrade for: Zuri Lund  Patient Name: Zuri Lund          MRN: [id number]       : 1960 Age: 57      Sex:  F   Utilization Background: She is a female with a history of migraine, Bellevue’s disease, Bipolar, Depression, Fibromyalgia, and HTN who presents to [facility] with migraine. She has 14 ER visits, 9 admission, and 2 transfers to [facility] for Ketamine Infusions in 2019. Discussed with Neurology reveals that patient does not feel much better even after prolonged hospitalization. In the last 90 days: 0 encounters    Treatment Recommendations:  ED Recommendations: Recommendations as per neurology: Give migraine protocol: using steroid protocol d/t toradol allergy. Recommend calling her [facility] Neurologist to schedule close outpatient follow up. It is noted that the patient will provide other complaints to the ER providers to get admitted and then will complain of migraine in the hospital.   Would not offer transfer for Ketamine Infusion to this patient as it has not worked in the past. This should be left up to Neurology to determine if this is appropriate. Inpatient Recommendations: Early consultation with neurology. Avoid narcotics/sedating agents due to over sedation in the past       Outpatient recommendations: Needs close follow up with [facility] Neurology. Needs CM to make sure that patient does not run out of her meds as she has in the past.    Situation: Patient who presents frequently for migraines. It seems that she has started using other chief complaints to get admitted to the hospital for migraine treatment.  As per neurology colleague her headache symptomatology does not usually change with treatment      PMH/PSH:  Migraine, Depression, Bipolar, Fibromyalgia, HTN, Hx of DVT      Assessment                              Drivers of repeated utilization:                 Symptomatology     Community Resources in place:    None - she has mentioned that she does not have a  for infusions  May need assistance with transportation and with medications   Patient Care Team:   Kwesi Pan MD - PCP [facility])  Moira Miranda, [address] [place] [address], MOON/Luigi Hernandez MD - Neurology [facility])  Jesus Priest MD - Hematology [facility])  OP Care Manager: Shana Hahn RN              Care plan date and owner: Liane Hogan. Miguel Diallochild. SIRISHA 10/31/2019         Reviewed with patient before discharge?

## 2023-09-01 DIAGNOSIS — G47.00 INSOMNIA, UNSPECIFIED TYPE: ICD-10-CM

## 2023-09-01 RX ORDER — HYDROXYZINE HYDROCHLORIDE 25 MG/1
25 TABLET, FILM COATED ORAL
Qty: 30 TABLET | Refills: 0 | Status: SHIPPED | OUTPATIENT
Start: 2023-09-01 | End: 2023-09-06 | Stop reason: SDUPTHER

## 2023-09-05 ENCOUNTER — ANESTHESIA (OUTPATIENT)
Dept: PERIOP | Facility: HOSPITAL | Age: 63
End: 2023-09-05
Payer: COMMERCIAL

## 2023-09-05 ENCOUNTER — TELEPHONE (OUTPATIENT)
Age: 63
End: 2023-09-05

## 2023-09-05 NOTE — TELEPHONE ENCOUNTER
Caller: Self    Doctor: Pooja Womack    Reason for call: Patient calling back for clarification on what she needs to get done for preadmission testing    Call back#: [phone]

## 2023-09-06 ENCOUNTER — OFFICE VISIT (OUTPATIENT)
Dept: FAMILY MEDICINE CLINIC | Facility: CLINIC | Age: 63
End: 2023-09-06
Payer: COMMERCIAL

## 2023-09-06 VITALS
SYSTOLIC BLOOD PRESSURE: 140 MMHG | HEART RATE: 80 BPM | BODY MASS INDEX: 38.16 KG/M2 | DIASTOLIC BLOOD PRESSURE: 90 MMHG | HEIGHT: 68 IN | OXYGEN SATURATION: 96 % | TEMPERATURE: 97.6 F

## 2023-09-06 DIAGNOSIS — G47.00 INSOMNIA, UNSPECIFIED TYPE: ICD-10-CM

## 2023-09-06 DIAGNOSIS — M16.12 PRIMARY OSTEOARTHRITIS OF LEFT HIP: Primary | ICD-10-CM

## 2023-09-06 PROCEDURE — 99214 OFFICE O/P EST MOD 30 MIN: CPT | Performed by: FAMILY MEDICINE

## 2023-09-06 RX ORDER — LORAZEPAM 0.5 MG/1
0.5 TABLET ORAL DAILY PRN
COMMUNITY
Start: 2023-08-28

## 2023-09-06 RX ORDER — CELECOXIB 200 MG/1
200 CAPSULE ORAL DAILY
Qty: 30 CAPSULE | Refills: 1 | Status: SHIPPED | OUTPATIENT
Start: 2023-09-06

## 2023-09-06 RX ORDER — HYDROXYZINE HYDROCHLORIDE 25 MG/1
50 TABLET, FILM COATED ORAL
Qty: 30 TABLET | Refills: 0 | Status: SHIPPED | OUTPATIENT
Start: 2023-09-06

## 2023-09-06 NOTE — PROGRESS NOTES
Assessment/Plan:   I recommend resting the hip is much as possible. Ice for flareups. We will try to get her into orthopedics for evaluation. Diagnoses and all orders for this visit:    Primary osteoarthritis of left hip  -     XR hip/pelv 2-3 vws left if performed; Future  -     celecoxib (CeleBREX) 200 mg capsule; Take 1 capsule (200 mg total) by mouth daily  -     Ambulatory Referral to Orthopedic Surgery; Future    Insomnia, unspecified type  -     hydrOXYzine HCL (ATARAX) 25 mg tablet; Take 2 tablets (50 mg total) by mouth daily at bedtime as needed (insomnia)    Other orders  -     LORazepam (ATIVAN) 0.5 mg tablet; Take 0.5 mg by mouth daily as needed (Patient not taking: Reported on 9/6/2023)          Subjective:     Patient ID: Eva Oneal is a 57 y.o. female. Patient presents with:  pain L hip: X 1 week progressively getting worse. No injury that pt remembers  no other concerns  slh    Complains of pain in the left lateral upper thigh and hip area has been going on for more than a week. She has a pain when standing or walking. Sitting seems to relieve the pain. Review of Systems   Constitutional: Negative. Respiratory: Negative. Cardiovascular: Negative. Gastrointestinal: Negative. Musculoskeletal: Positive for arthralgias. As noted in HPI. Objective:     Physical Exam  Vitals and nursing note reviewed. Constitutional:       Appearance: She is well-developed. HENT:      Head: Normocephalic and atraumatic. Eyes:      Pupils: Pupils are equal, round, and reactive to light. Neck:      Vascular: No JVD. Cardiovascular:      Rate and Rhythm: Normal rate. Pulmonary:      Effort: Pulmonary effort is normal.   Abdominal:      Palpations: Abdomen is soft. Musculoskeletal:      Cervical back: Normal range of motion. Left hip: Crepitus present. No deformity. Decreased range of motion. Decreased strength.    Lymphadenopathy:      Cervical: No cervical adenopathy. Neurological:      Mental Status: She is alert and oriented to person, place, and time.

## 2023-09-07 ENCOUNTER — APPOINTMENT (OUTPATIENT)
Dept: RADIOLOGY | Facility: MEDICAL CENTER | Age: 63
End: 2023-09-07
Payer: COMMERCIAL

## 2023-09-07 DIAGNOSIS — M16.12 PRIMARY OSTEOARTHRITIS OF LEFT HIP: ICD-10-CM

## 2023-09-07 PROCEDURE — 73502 X-RAY EXAM HIP UNI 2-3 VIEWS: CPT

## 2023-09-07 NOTE — TELEPHONE ENCOUNTER
Caller: Self    Doctor: Darrick Inman    Reason for call: Patient calling to provide Information for cardiologist office.  Appt is tomorrow, can forms be faxed to them:  Dr. Charito Barriga [phone]  Fax [phone]    Call back#: [phone]

## 2023-09-13 ENCOUNTER — TELEPHONE (OUTPATIENT)
Dept: OBGYN CLINIC | Facility: MEDICAL CENTER | Age: 63
End: 2023-09-13

## 2023-09-13 NOTE — TELEPHONE ENCOUNTER
LVM with pt, stating we needed to change apt time to either 8 or 915- or to another day if those times did not work for her. Adv to give the office a call to make this change.

## 2023-09-14 DIAGNOSIS — G47.00 INSOMNIA, UNSPECIFIED TYPE: ICD-10-CM

## 2023-09-14 RX ORDER — HYDROXYZINE HYDROCHLORIDE 25 MG/1
50 TABLET, FILM COATED ORAL
Qty: 180 TABLET | Refills: 1 | OUTPATIENT
Start: 2023-09-14

## 2023-09-18 ENCOUNTER — APPOINTMENT (EMERGENCY)
Dept: RADIOLOGY | Facility: HOSPITAL | Age: 63
End: 2023-09-18
Payer: COMMERCIAL

## 2023-09-18 ENCOUNTER — APPOINTMENT (EMERGENCY)
Dept: CT IMAGING | Facility: HOSPITAL | Age: 63
End: 2023-09-18
Payer: COMMERCIAL

## 2023-09-18 ENCOUNTER — OFFICE VISIT (OUTPATIENT)
Dept: OBGYN CLINIC | Facility: MEDICAL CENTER | Age: 63
End: 2023-09-18
Payer: COMMERCIAL

## 2023-09-18 ENCOUNTER — HOSPITAL ENCOUNTER (EMERGENCY)
Facility: HOSPITAL | Age: 63
Discharge: HOME | End: 2023-09-18
Attending: EMERGENCY MEDICINE | Admitting: EMERGENCY MEDICINE
Payer: COMMERCIAL

## 2023-09-18 VITALS
DIASTOLIC BLOOD PRESSURE: 83 MMHG | SYSTOLIC BLOOD PRESSURE: 138 MMHG | HEART RATE: 92 BPM | WEIGHT: 251 LBS | BODY MASS INDEX: 38.04 KG/M2 | HEIGHT: 68 IN

## 2023-09-18 VITALS
RESPIRATION RATE: 18 BRPM | DIASTOLIC BLOOD PRESSURE: 60 MMHG | OXYGEN SATURATION: 96 % | TEMPERATURE: 98.4 F | SYSTOLIC BLOOD PRESSURE: 138 MMHG | HEART RATE: 87 BPM

## 2023-09-18 DIAGNOSIS — M16.12 PRIMARY OSTEOARTHRITIS OF LEFT HIP: ICD-10-CM

## 2023-09-18 DIAGNOSIS — R07.9 CHEST PAIN, UNSPECIFIED TYPE: Primary | ICD-10-CM

## 2023-09-18 DIAGNOSIS — M54.16 RADICULOPATHY, LUMBAR REGION: Primary | ICD-10-CM

## 2023-09-18 DIAGNOSIS — E87.1 HYPONATREMIA: ICD-10-CM

## 2023-09-18 DIAGNOSIS — R42 DIZZINESS: ICD-10-CM

## 2023-09-18 LAB
2HR DELTA HS TROPONIN: 1 NG/L
ALBUMIN SERPL BCP-MCNC: 3.9 G/DL (ref 3.5–5)
ALP SERPL-CCNC: 137 U/L (ref 34–104)
ALT SERPL W P-5'-P-CCNC: 10 U/L (ref 7–52)
ANION GAP SERPL CALCULATED.3IONS-SCNC: 10 MMOL/L
AST SERPL W P-5'-P-CCNC: 19 U/L (ref 13–39)
ATRIAL RATE: 82 BPM
ATRIAL RATE: 86 BPM
BASOPHILS # BLD AUTO: 0.1 THOUSANDS/ÂΜL (ref 0–0.1)
BASOPHILS NFR BLD AUTO: 1 % (ref 0–1)
BILIRUB SERPL-MCNC: 0.67 MG/DL (ref 0.2–1)
BUN SERPL-MCNC: 22 MG/DL (ref 5–25)
CALCIUM SERPL-MCNC: 9.2 MG/DL (ref 8.4–10.2)
CARDIAC TROPONIN I PNL SERPL HS: 5 NG/L
CARDIAC TROPONIN I PNL SERPL HS: 6 NG/L
CHLORIDE SERPL-SCNC: 95 MMOL/L (ref 96–108)
CO2 SERPL-SCNC: 24 MMOL/L (ref 21–32)
CREAT SERPL-MCNC: 1.11 MG/DL (ref 0.6–1.3)
EOSINOPHIL # BLD AUTO: 0.06 THOUSAND/ÂΜL (ref 0–0.61)
EOSINOPHIL NFR BLD AUTO: 0 % (ref 0–6)
ERYTHROCYTE [DISTWIDTH] IN BLOOD BY AUTOMATED COUNT: 14 % (ref 11.6–15.1)
GFR SERPL CREATININE-BSD FRML MDRD: 52 ML/MIN/1.73SQ M
GLUCOSE SERPL-MCNC: 97 MG/DL (ref 65–140)
HCT VFR BLD AUTO: 39.4 % (ref 34.8–46.1)
HGB BLD-MCNC: 13.2 G/DL (ref 11.5–15.4)
IMM GRANULOCYTES # BLD AUTO: 0.24 THOUSAND/UL (ref 0–0.2)
IMM GRANULOCYTES NFR BLD AUTO: 2 % (ref 0–2)
LIPASE SERPL-CCNC: 10 U/L (ref 11–82)
LYMPHOCYTES # BLD AUTO: 2.07 THOUSANDS/ÂΜL (ref 0.6–4.47)
LYMPHOCYTES NFR BLD AUTO: 14 % (ref 14–44)
MCH RBC QN AUTO: 30 PG (ref 26.8–34.3)
MCHC RBC AUTO-ENTMCNC: 33.5 G/DL (ref 31.4–37.4)
MCV RBC AUTO: 90 FL (ref 82–98)
MONOCYTES # BLD AUTO: 1.64 THOUSAND/ÂΜL (ref 0.17–1.22)
MONOCYTES NFR BLD AUTO: 11 % (ref 4–12)
NEUTROPHILS # BLD AUTO: 10.37 THOUSANDS/ÂΜL (ref 1.85–7.62)
NEUTS SEG NFR BLD AUTO: 72 % (ref 43–75)
NRBC BLD AUTO-RTO: 0 /100 WBCS
P AXIS: 36 DEGREES
P AXIS: 39 DEGREES
PLATELET # BLD AUTO: 440 THOUSANDS/UL (ref 149–390)
PMV BLD AUTO: 9 FL (ref 8.9–12.7)
POTASSIUM SERPL-SCNC: 4.7 MMOL/L (ref 3.5–5.3)
PR INTERVAL: 166 MS
PR INTERVAL: 180 MS
PROT SERPL-MCNC: 7.3 G/DL (ref 6.4–8.4)
QRS AXIS: -15 DEGREES
QRS AXIS: -20 DEGREES
QRSD INTERVAL: 102 MS
QRSD INTERVAL: 96 MS
QT INTERVAL: 360 MS
QT INTERVAL: 380 MS
QTC INTERVAL: 430 MS
QTC INTERVAL: 443 MS
RBC # BLD AUTO: 4.4 MILLION/UL (ref 3.81–5.12)
SODIUM SERPL-SCNC: 129 MMOL/L (ref 135–147)
T WAVE AXIS: -8 DEGREES
T WAVE AXIS: 24 DEGREES
VENTRICULAR RATE: 82 BPM
VENTRICULAR RATE: 86 BPM
WBC # BLD AUTO: 14.48 THOUSAND/UL (ref 4.31–10.16)

## 2023-09-18 PROCEDURE — 93010 ELECTROCARDIOGRAM REPORT: CPT | Performed by: INTERNAL MEDICINE

## 2023-09-18 PROCEDURE — 99285 EMERGENCY DEPT VISIT HI MDM: CPT

## 2023-09-18 PROCEDURE — 85025 COMPLETE CBC W/AUTO DIFF WBC: CPT | Performed by: EMERGENCY MEDICINE

## 2023-09-18 PROCEDURE — G1004 CDSM NDSC: HCPCS

## 2023-09-18 PROCEDURE — 80053 COMPREHEN METABOLIC PANEL: CPT | Performed by: EMERGENCY MEDICINE

## 2023-09-18 PROCEDURE — 83690 ASSAY OF LIPASE: CPT | Performed by: STUDENT IN AN ORGANIZED HEALTH CARE EDUCATION/TRAINING PROGRAM

## 2023-09-18 PROCEDURE — 99214 OFFICE O/P EST MOD 30 MIN: CPT | Performed by: ORTHOPAEDIC SURGERY

## 2023-09-18 PROCEDURE — 96375 TX/PRO/DX INJ NEW DRUG ADDON: CPT

## 2023-09-18 PROCEDURE — 71045 X-RAY EXAM CHEST 1 VIEW: CPT

## 2023-09-18 PROCEDURE — 99285 EMERGENCY DEPT VISIT HI MDM: CPT | Performed by: EMERGENCY MEDICINE

## 2023-09-18 PROCEDURE — 20610 DRAIN/INJ JOINT/BURSA W/O US: CPT | Performed by: ORTHOPAEDIC SURGERY

## 2023-09-18 PROCEDURE — 84484 ASSAY OF TROPONIN QUANT: CPT | Performed by: EMERGENCY MEDICINE

## 2023-09-18 PROCEDURE — 71275 CT ANGIOGRAPHY CHEST: CPT

## 2023-09-18 PROCEDURE — 96374 THER/PROPH/DIAG INJ IV PUSH: CPT

## 2023-09-18 PROCEDURE — 36415 COLL VENOUS BLD VENIPUNCTURE: CPT

## 2023-09-18 PROCEDURE — 93005 ELECTROCARDIOGRAM TRACING: CPT

## 2023-09-18 PROCEDURE — 74174 CTA ABD&PLVS W/CONTRAST: CPT

## 2023-09-18 RX ORDER — ONDANSETRON 2 MG/ML
4 INJECTION INTRAMUSCULAR; INTRAVENOUS ONCE
Status: COMPLETED | OUTPATIENT
Start: 2023-09-18 | End: 2023-09-18

## 2023-09-18 RX ORDER — FAMOTIDINE 10 MG/ML
20 INJECTION, SOLUTION INTRAVENOUS ONCE
Status: COMPLETED | OUTPATIENT
Start: 2023-09-18 | End: 2023-09-18

## 2023-09-18 RX ORDER — METHYLPREDNISOLONE ACETATE 40 MG/ML
1 INJECTION, SUSPENSION INTRA-ARTICULAR; INTRALESIONAL; INTRAMUSCULAR; SOFT TISSUE
Status: COMPLETED | OUTPATIENT
Start: 2023-09-18 | End: 2023-09-18

## 2023-09-18 RX ORDER — BUPIVACAINE HYDROCHLORIDE 2.5 MG/ML
4 INJECTION, SOLUTION INFILTRATION; PERINEURAL
Status: COMPLETED | OUTPATIENT
Start: 2023-09-18 | End: 2023-09-18

## 2023-09-18 RX ORDER — MAGNESIUM HYDROXIDE/ALUMINUM HYDROXICE/SIMETHICONE 120; 1200; 1200 MG/30ML; MG/30ML; MG/30ML
30 SUSPENSION ORAL ONCE
Status: COMPLETED | OUTPATIENT
Start: 2023-09-18 | End: 2023-09-18

## 2023-09-18 RX ORDER — METOCLOPRAMIDE HYDROCHLORIDE 5 MG/ML
10 INJECTION INTRAMUSCULAR; INTRAVENOUS ONCE
Status: COMPLETED | OUTPATIENT
Start: 2023-09-18 | End: 2023-09-18

## 2023-09-18 RX ORDER — LIDOCAINE HYDROCHLORIDE 20 MG/ML
15 SOLUTION OROPHARYNGEAL ONCE
Status: COMPLETED | OUTPATIENT
Start: 2023-09-18 | End: 2023-09-18

## 2023-09-18 RX ORDER — DEXAMETHASONE 4 MG/1
TABLET ORAL
COMMUNITY
Start: 2023-09-14 | End: 2023-09-27 | Stop reason: ALTCHOICE

## 2023-09-18 RX ORDER — ACETAMINOPHEN 325 MG/1
975 TABLET ORAL ONCE
Status: COMPLETED | OUTPATIENT
Start: 2023-09-18 | End: 2023-09-18

## 2023-09-18 RX ORDER — MECLIZINE HCL 12.5 MG/1
12.5 TABLET ORAL ONCE
Status: COMPLETED | OUTPATIENT
Start: 2023-09-18 | End: 2023-09-18

## 2023-09-18 RX ADMIN — ALUMINUM HYDROXIDE, MAGNESIUM HYDROXIDE, AND DIMETHICONE 30 ML: 200; 20; 200 SUSPENSION ORAL at 13:08

## 2023-09-18 RX ADMIN — ONDANSETRON 4 MG: 2 INJECTION INTRAMUSCULAR; INTRAVENOUS at 13:08

## 2023-09-18 RX ADMIN — ACETAMINOPHEN 975 MG: 325 TABLET ORAL at 13:07

## 2023-09-18 RX ADMIN — Medication 15 ML: at 15:11

## 2023-09-18 RX ADMIN — METOCLOPRAMIDE 10 MG: 5 INJECTION, SOLUTION INTRAMUSCULAR; INTRAVENOUS at 15:12

## 2023-09-18 RX ADMIN — BUPIVACAINE HYDROCHLORIDE 4 ML: 2.5 INJECTION, SOLUTION INFILTRATION; PERINEURAL at 10:30

## 2023-09-18 RX ADMIN — FAMOTIDINE 20 MG: 10 INJECTION INTRAVENOUS at 13:11

## 2023-09-18 RX ADMIN — METHYLPREDNISOLONE ACETATE 1 ML: 40 INJECTION, SUSPENSION INTRA-ARTICULAR; INTRALESIONAL; INTRAMUSCULAR; SOFT TISSUE at 10:30

## 2023-09-18 RX ADMIN — MECLIZINE 12.5 MG: 12.5 TABLET ORAL at 16:24

## 2023-09-18 RX ADMIN — IOHEXOL 100 ML: 350 INJECTION, SOLUTION INTRAVENOUS at 13:00

## 2023-09-18 NOTE — DISCHARGE INSTRUCTIONS
Please schedule an appointment with your PCP and GI for follow-up     Please return to the emergency department if you experience severe shortness of breath, chest pain, or any other concerning symptoms.

## 2023-09-18 NOTE — ED ATTENDING ATTESTATION
9/18/2023  IRalph DO, saw and evaluated the patient. I have discussed the patient with the resident/non-physician practitioner and agree with the resident's/non-physician practitioner's findings, Plan of Care, and MDM as documented in the resident's/non-physician practitioner's note, except where noted. All available labs and Radiology studies were reviewed. I was present for key portions of any procedure(s) performed by the resident/non-physician practitioner and I was immediately available to provide assistance. At this point I agree with the current assessment done in the Emergency Department. I have conducted an independent evaluation of this patient a history and physical is as follows:    58 yo F presenting with chest pressure. Sx started around 5pm last night. Substernal chest pressure and pain radiating to mid/L back. +Nausea without vomiting.    Denies cough/URI sx, abdominal pain, changes in stools, urinary sx    MDM: 58 yo F with chest discomfort- EKG to r/o STEMI/dysrhythmia/abn intervals/ischemic changes, troponin to eval for ischemia, CBC to assess for leukocytosis/anemia, CMP to assess for elevated LFTs/ROSALINO/electrolyte derangements, CXR to r/o PTX/PNA/effusion/CHF, CTA to r/o dissection, lipase to r/o pancreatitis      ED Course         Critical Care Time  Procedures

## 2023-09-18 NOTE — ED PROVIDER NOTES
History  Chief Complaint   Patient presents with   • Chest Pain     Pt reports chest pain that radiates to back. Pt also c/o sob. Pt reports feeling off balance. 56 yo F with PMHx as listed below, complains of mid-sternal chest pressure that radiates into back and neck. Pt states her symptoms began suddenly while cooking dinner around 1700 yesterday. No associated nausea, vomiting, or diaphoresis. She states she did experience SOB on exertion but is no longer SOB. Pt rates the discomfort @9/10. Pt has had a similar in the past which was evaluated in the ED no diagnosis was made. Pt also complains of dizziness, the room doesn't spin. Prior to Admission Medications   Prescriptions Last Dose Informant Patient Reported? Taking?    ARIPiprazole (ABILIFY) 5 mg tablet  Self Yes No   Sig: TAKE 1 TABLET EVERY MORNING (CORRECTION BY DR OF PREV SCRIPT)   B-D INSULIN SYRINGE 1CC/25GX1" 25G X 1" 1 ML MISC  Self Yes No   Sig: USE WITH DHE   LORazepam (ATIVAN) 0.5 mg tablet   Yes No   Sig: Take 0.5 mg by mouth daily as needed   Patient not taking: Reported on 9/6/2023   Trudhesa 0.725 MG/ACT AERS  Self Yes No   Ubrelvy 100 MG tablet  Self Yes No   Sig: TAKE 1 TABLET TWICE A DAY AS NEEDED FOR HEADACHE, LIMIT 16 TABS PER MONTH   amLODIPine (NORVASC) 5 mg tablet  Self Yes No   Sig: Take 5 mg by mouth daily   apixaban (Eliquis) 5 mg   No No   Sig: Take 1 tablet (5 mg total) by mouth 2 (two) times a day   atorvastatin (LIPITOR) 40 mg tablet  Self Yes No   Sig: TAKE 1 TABLET BY MOUTH EVERY DAY AT NIGHT   celecoxib (CeleBREX) 200 mg capsule   No No   Sig: Take 1 capsule (200 mg total) by mouth daily   cholecalciferol (VITAMIN D3) 1,000 units tablet  Self Yes No   dexamethasone (DECADRON) 4 mg tablet   Yes No   Sig: PLEASE SEE ATTACHED FOR DETAILED DIRECTIONS   dihydroergotamine (DHE) 1 mg/mL  Self Yes No   Sig: INJECT 1 ML (1 MG TOTAL) INJECT INTO THE MUSCLE AS NEEDED FOR MIGRAINE.   furosemide (LASIX) 20 mg tablet Self Yes No   Sig: Take 20 mg by mouth as needed Pt reports calls her [facility] cardiologist Dr Kristy Roblero before taking    gabapentin (NEURONTIN) 600 MG tablet  Self Yes No   Sig: Take 600 mg by mouth 3 (three) times a day   hydrOXYzine HCL (ATARAX) 25 mg tablet   No No   Sig: Take 2 tablets (50 mg total) by mouth daily at bedtime as needed (insomnia)   lamoTRIgine (LaMICtal) 200 MG tablet  Self Yes No   Sig: Take 200 mg by mouth daily. meclizine (ANTIVERT) 12.5 MG tablet  Self No No   Sig: Take 1 tablet (12.5 mg total) by mouth every 8 (eight) hours as needed for dizziness for up to 7 days   methocarbamol (ROBAXIN) 750 mg tablet  Self Yes No   Sig: TAKE 1 TABLET BY MOUTH 3 TIMES A DAY AS NEEDED FOR MUSCLE SPASMS.    ondansetron (ZOFRAN-ODT) 4 mg disintegrating tablet  Self Yes No   Sig: TAKE 1 TABLET BY MOUTH EVERY 8 HOURS AS NEEDED FOR NAUSEA AND VOMITING   potassium chloride (K-DUR,KLOR-CON) 20 mEq tablet  Self Yes No   Sig: Take 20 mEq by mouth daily        Facility-Administered Medications: None       Past Medical History:   Diagnosis Date   • Adrenal insufficiency (Brooke's disease) ([facility])    • Anxiety    • Arthritis    • Bipolar disorder    • Cervical radiculopathy    • Chronic back pain    • Chronic pain disorder     lumbar   • Depression    • DVT, lower extremity ([address])     1991 and 2019 now on eliquis   • Exercises 5 to 6 times per week     5 days per week with weights/band and also goes to PT 2x/week   • Fibromyalgia    • History of Clostridioides difficile infection    • History of MRSA infection     pt denies having this   • History of recent fall 07/2021    "possibly injured left shoulder'   • History of TIAs     cannot remember details   • Hypertension    • Hypokalemia    • Left shoulder pain     "not too bad" goes to PT 2x/week   • Migraine    • Psychiatric disorder     Anxiety, major depression, bipolar   • Spinal stenosis    • Stroke [facility])     pt reports" not remembering having a stroke"   • Syncope 2014 orthostatic hypotension   • Wears dentures     upper   • Wears glasses        Past Surgical History:   Procedure Laterality Date   • APPENDECTOMY     • CAST APPLICATION Left 00/46/4431    Procedure: Application short-arm splint;  Surgeon: Erika Bustamante MD;  [facility];  Service: Orthopedics   • COLONOSCOPY     • FL INJECTION LEFT SHOULDER (ARTHROGRAM)  11/10/2021   • GASTRIC RESTRICTION SURGERY      Gastric Sleeve Nov 2015   • HYSTERECTOMY      DONALD   • JOINT REPLACEMENT      Left Knee 2011 and Right Hip 2010   • OOPHORECTOMY     • ORIF WRIST FRACTURE Left 07/04/2020    Procedure: Open reduction and internal fixation left radius and ulnar shaft fracture;  Surgeon: Erika Bustamante MD;  [facility];  Service: Orthopedics   • [place] ARTHROPLASTY GLENOHUMERAL JOINT TOTAL SHOULDER Left 03/30/2021    Procedure: ARTHROPLASTY SHOULDER - anatomic;  Surgeon: Christina Horn MD;  [facility];  Service: Orthopedics   • [place] NATIVIDAD SHOULDER ARTHRPLSTY HUMERAL&GLENOID COMPNT Left 12/21/2021    Procedure: REVISION OF TOTAL SHOULDER ARTHROPLASTY TO REVERSE TOTAL SHOULDER ARTHROPLASTY;  Surgeon: Kadie Abarca MD;  [facility];  Service: Orthopedics       Family History   Problem Relation Age of Onset   • Breast cancer Mother 40   • Migraines Mother    • Arthritis Mother    • Kidney disease Father    • Heart disease Father    • Diabetes Father    • Arthritis Father    • Brain cancer Brother    • Seizures Brother      I have reviewed and agree with the history as documented.     E-Cigarette/Vaping   • E-Cigarette Use Never User    • Cartridges/Day 0    • Comments 0      E-Cigarette/Vaping Substances   • Nicotine No    • THC No    • CBD No    • Flavoring No    • Other No    • Unknown No      Social History     Tobacco Use   • Smoking status: Former     Packs/day: 0.20     Years: 20.00     Total pack years: 4.00     Types: Cigarettes     Start date: 0     Quit date: 2008     Years since quitting: 15.7   • Smokeless tobacco: Never   • Tobacco comments:     quit   Vaping Use   • Vaping Use: Never used   Substance Use Topics   • Alcohol use: Not Currently     Alcohol/week: 2.0 standard drinks of alcohol     Types: 1 Glasses of wine, 1 Standard drinks or equivalent per week     Comment: occasionally   • Drug use: Never     Comment: na        Review of Systems   Cardiovascular: Positive for chest pain.        Physical Exam  ED Triage Vitals   Temperature Pulse Respirations Blood Pressure SpO2   09/18/23 1254 09/18/23 1144 09/18/23 1144 09/18/23 1144 09/18/23 1144   98.4 °F (36.9 °C) 91 20 143/67 97 %      Temp Source Heart Rate Source Patient Position - Orthostatic VS BP Location FiO2 (%)   09/18/23 1254 09/18/23 1144 09/18/23 1312 09/18/23 1144 --   Oral Monitor Sitting Right arm       Pain Score       09/18/23 1144       9             Orthostatic Vital Signs  Vitals:    09/18/23 1144 09/18/23 1312 09/18/23 1400 09/18/23 1500   BP: 143/67 130/58 151/61 108/52   Pulse: 91 84 84 80   Patient Position - Orthostatic VS:  Sitting Lying Lying         Physical Exam   Gen: NAD, AA&Ox3  HEENT: PERRL, EOMI  Neck: supple  CV: RRR  Lungs: CTA B/L  Abdomen: soft, NT/ND, no rebound  Ext: no swelling or deformity  Neuro: 5/5 strength all extremities, sensation grossly intact  Skin: no rash    ED Medications  Medications   meclizine (ANTIVERT) tablet 12.5 mg (has no administration in time range)   acetaminophen (TYLENOL) tablet 975 mg (975 mg Oral Given 9/18/23 1307)   aluminum-magnesium hydroxide-simethicone (MAALOX) oral suspension 30 mL (30 mL Oral Given 9/18/23 1308)   Famotidine (PF) (PEPCID) injection 20 mg (20 mg Intravenous Given 9/18/23 1311)   ondansetron (ZOFRAN) injection 4 mg (4 mg Intravenous Given 9/18/23 1308)   iohexol (OMNIPAQUE) 350 MG/ML injection (SINGLE-DOSE) 100 mL (100 mL Intravenous Given 9/18/23 1300)   Lidocaine Viscous HCl (XYLOCAINE) 2 % mucosal solution 15 mL (15 mL Swish & Spit Given 9/18/23 1511)   metoclopramide (REGLAN) injection 10 mg (10 mg Intravenous Given 9/18/23 1512)       Diagnostic Studies  Results Reviewed     Procedure Component Value Units Date/Time    HS Troponin I 2hr [[id number]]  (Normal) Collected: 09/18/23 1406    Lab Status: Final result Specimen: Blood from Arm, Right Updated: 09/18/23 1435     hs TnI 2hr 6 ng/L      Delta 2hr hsTnI 1 ng/L     Lipase [[id number]]  (Abnormal) Collected: 09/18/23 1216    Lab Status: Final result Specimen: Blood from Arm, Right Updated: 09/18/23 1332     Lipase 10 u/L     HS Troponin 0hr (reflex protocol) [[id number]]  (Normal) Collected: 09/18/23 1216    Lab Status: Final result Specimen: Blood from Arm, Right Updated: 09/18/23 1245     hs TnI 0hr 5 ng/L     Comprehensive metabolic panel [[id number]]  (Abnormal) Collected: 09/18/23 1216    Lab Status: Final result Specimen: Blood from Arm, Right Updated: 09/18/23 1240     Sodium 129 mmol/L      Potassium 4.7 mmol/L      Chloride 95 mmol/L      CO2 24 mmol/L      ANION GAP 10 mmol/L      BUN 22 mg/dL      Creatinine 1.11 mg/dL      Glucose 97 mg/dL      Calcium 9.2 mg/dL      AST 19 U/L      ALT 10 U/L      Alkaline Phosphatase 137 U/L      Total Protein 7.3 g/dL      Albumin 3.9 g/dL      Total Bilirubin 0.67 mg/dL      eGFR 52 ml/min/1.73sq m     Narrative:      [place] guidelines for Chronic Kidney Disease (CKD):   •  Stage 1 with normal or high GFR (GFR > 90 mL/min/1.73 square meters)  •  Stage 2 Mild CKD (GFR = 60-89 mL/min/1.73 square meters)  •  Stage 3A Moderate CKD (GFR = 45-59 mL/min/1.73 square meters)  •  Stage 3B Moderate CKD (GFR = 30-44 mL/min/1.73 square meters)  •  Stage 4 Severe CKD (GFR = 15-29 mL/min/1.73 square meters)  •  Stage 5 End Stage CKD (GFR <15 mL/min/1.73 square meters)  Note: GFR calculation is accurate only with a steady state creatinine    CBC and differential [[id number]]  (Abnormal) Collected: 09/18/23 1216    Lab Status: Final result Specimen: Blood from Arm, Right Updated: 09/18/23 1222     WBC 14.48 Thousand/uL      RBC 4.40 Million/uL      Hemoglobin 13.2 g/dL      Hematocrit 39.4 %      MCV 90 fL      MCH 30.0 pg      MCHC 33.5 g/dL      RDW 14.0 %      MPV 9.0 fL      Platelets 342 Thousands/uL      nRBC 0 /100 WBCs      Neutrophils Relative 72 %      Immat GRANS % 2 %      Lymphocytes Relative 14 %      Monocytes Relative 11 %      Eosinophils Relative 0 %      Basophils Relative 1 %      Neutrophils Absolute 10.37 Thousands/µL      Immature Grans Absolute 0.24 Thousand/uL      Lymphocytes Absolute 2.07 Thousands/µL      Monocytes Absolute 1.64 Thousand/µL      Eosinophils Absolute 0.06 Thousand/µL      Basophils Absolute 0.10 Thousands/µL                  CTA dissection protocol chest/abdomen/pelvis   ED Interpretation by Faye Ames DO (09/18 1406)   FINDINGS:     AORTA:  There is no aortic dissection or intramural hematoma. There is no aortic aneurysm.     No significant atherosclerotic disease. Specifically, no flow limiting atherosclerotic stenosis of aorta or major aortic branch vessel in the chest, abdomen or pelvis. Minimal atherosclerotic calcifications.        CHEST  LUNGS: Minimal subsegmental atelectasis in the left lower lobe. There is no tracheal or endobronchial lesion.     PLEURA:  Unremarkable.     HEART/PULMONARY ARTERIAL TREE:  Unremarkable for patient's age.     MEDIASTINUM AND JEZ: Small hiatal hernia.     CHEST WALL AND LOWER NECK:  Unremarkable.     ABDOMEN     LIVER/BILIARY TREE:  Unremarkable.     GALLBLADDER:  No calcified gallstones. No pericholecystic inflammatory change.     SPLEEN:  Unremarkable.     PANCREAS:  Unremarkable.     ADRENAL GLANDS:  Unremarkable.     KIDNEYS/URETERS: Small nonobstructing left lower pole renal calculus. No hydronephrosis.     STOMACH AND BOWEL: Small hiatal hernia. Status    post sleeve gastrectomy. Stomach is nondistended. Normal course and caliber of the duodenum.  No dilated loops of bowel. Mild colonic diverticulosis without evidence of acute diverticulitis.     APPENDIX: There are expected postoperative changes of appendectomy.     ABDOMINOPELVIC CAVITY:  No ascites or free intraperitoneal air. No lymphadenopathy. A few tiny calcified lymph nodes in the right lower quadrant are nonspecific.     PELVIS     REPRODUCTIVE ORGANS:  Patient is status post hysterectomy.     URINARY BLADDER:  Unremarkable.     ABDOMINAL WALL/INGUINAL REGIONS:  Unremarkable.     OSSEOUS STRUCTURES:  No acute fracture or destructive osseous lesion. Status post right total hip arthroplasty and left shoulder arthroplasty. Degenerative changes in the lumbar spine.     IMPRESSION:     No acute aortic pathology.         Final Result by Griselda Jamaica, MD (09/18 1343)      No acute aortic pathology. Workstation performed: [id number]         XR chest 1 view portable   Final Result by Candice Case MD (09/18 1415)      No acute cardiopulmonary disease. Workstation performed: [id number]               Procedures  Procedures      ED Course  ED Course as of 09/18/23 1554   Mon Sep 18, 2023   1258 hs TnI 0hr: 5   1536 On reassessment, pt states she feels better. Lab values and workup discussed, pt understands and will follow-up with her PCP. HEART Risk Score    Flowsheet Row Most Recent Value   Heart Score Risk Calculator    History 0 Filed at: 09/18/2023 1537   ECG 0 Filed at: 09/18/2023 1537   Age 1 Filed at: 09/18/2023 1537   Risk Factors 1 Filed at: 09/18/2023 1537   Troponin 0 Filed at: 09/18/2023 1537   HEART Score 2 Filed at: 09/18/2023 1537                      SBIRT 20yo+    Flowsheet Row Most Recent Value   Initial Alcohol Screen: US AUDIT-C     1. How often do you have a drink containing alcohol? 0 Filed at: 09/18/2023 1256   2. How many drinks containing alcohol do you have on a typical day you are drinking? 0 Filed at: 09/18/2023 1256   3a.  Male UNDER 65: How often do you have five or more drinks on one occasion? 0 Filed at: 09/18/2023 1256   3b. FEMALE Any Age, or MALE 65+: How often do you have 4 or more drinks on one occassion? 0 Filed at: 09/18/2023 1256   Audit-C Score 0 Filed at: 09/18/2023 1256   CHANDRAKANT: How many times in the past year have you. .. Used an illegal drug or used a prescription medication for non-medical reasons? Never Filed at: 09/18/2023 1256                Medical Decision Making  56 yo F with chest pressure that radiates into neck/back. Differentials include ACS, dissection, acute pancreatitis, PE, and gastritis. Labs, trops, EKG, and CT dissection study ordered     Imaging and labs reviewed, no ACS, PE, or dissection. Pt continues to experience chest discomfort, will order viscous lidocaine + Reglan and reassess. Pt feels better with treatment. Care plan discussed, pt will follow-up with PCP in regards to lab values and today's visit. All questions answered, return precautions given. Pt stable for discharge. Chest pain, unspecified type: acute illness or injury  Dizziness: acute illness or injury  Amount and/or Complexity of Data Reviewed  External Data Reviewed: labs, ECG and notes. Labs: ordered. Decision-making details documented in ED Course. Radiology: ordered. Risk  OTC drugs. Prescription drug management.             Disposition  Final diagnoses:   Chest pain, unspecified type   Dizziness   Hyponatremia     Time reflects when diagnosis was documented in both MDM as applicable and the Disposition within this note     Time User Action Codes Description Comment    9/18/2023  3:18 PM Anai Mccall Add [R07.9] Chest pain, unspecified type     9/18/2023  3:32 PM Lavenia Stefany Add [R42] Dizziness     9/18/2023  3:49 PM Lavenia Stefany Add [E87.1] Hyponatremia       ED Disposition     ED Disposition   Discharge    Condition   Stable    Date/Time   Mon Sep 18, 2023  3:50 PM    Comment   Abby Contreras discharge to home/self care.               Follow-up Information     Follow up With Specialties Details Why Desmond Hart MD Family Medicine Schedule an appointment as soon as possible for a visit  For follow-up [address] [id number]             Patient's Medications   Discharge Prescriptions    No medications on file     No discharge procedures on file. PDMP Review       Value Time User    PDMP Reviewed  Yes 1/29/2022  3:09 PM Nayla Montano MD           ED Provider  Attending physically available and evaluated Hanane Stanton. I managed the patient along with the ED Attending.     Electronically Signed by         Adolfo Mistry MD  09/18/23 8699

## 2023-09-18 NOTE — PROGRESS NOTES
Orthopaedic Surgery - Office Note  Richi Lopez (76 y.o. female)   : 1960   MRN: [id number]  Encounter Date: 2023    Chief Complaint   Patient presents with   • Left Hip - Pain       Assessment / Plan  Left hip osteoarthritis, with lumbar radiculopathy. · I explained to the patient she does have osteoarthritis in her left hip; however, due to her low back pain and lateral hip pain it is possible her left hip pain is referred from her back. · Referral to Spine and Pain provided. Patient is currently being treated for her back at [facility]. Patient states she would like to transfer care from [facility] to [facility]. · Xrays from 2023 reviewed with the patient. Return if symptoms worsen or fail to improve. History of Present Illness  Richi Lopez is a 57 y.o. female who presents for consultation of left hip pain referred to me by her PCP. She reports a chronic onset of left hip pain in the beginning of September. She saw her family doctor for her left hip pain who ordered an xray and referred her to orthopedics. She has a prior history of left shoulder arthroplasty, right hip arthroplasty, and left knee arthroplasty. She has a right shoulder arthroplasty scheduled for 10/17/2023. She has a history of back pain she is being treated for a [facility]. She reports significant pain in the lateral aspect of the left hip with sleeping and walking. She denies distal numbness or tingling. Review of Systems  Pertinent items are noted in HPI. All other systems were reviewed and are negative. Physical Exam  /83   Pulse 92   Ht 5' 8" (1.727 m)   Wt 114 kg (251 lb)   BMI 38.16 kg/m²   Cons: Appears well. No apparent distress. Psych: Alert. Oriented x3. Mood and affect normal.  Eyes: PERRLA, EOMI  Resp: Normal effort. No audible wheezing or stridor. CV: Palpable pulse. No discernable arrhythmia. No LE edema. Lymph:  No palpable cervical, axillary, or inguinal lymphadenopathy. Skin: Warm.   No palpable masses. No visible lesions. Neuro: Normal muscle tone. Normal and symmetric DTR's. Left Hip Exam  Alignment / Posture:  Normal resting hip posture. Inspection:  No swelling. No erythema. No ecchymosis. Palpation:  Moderate tenderness at SI joint, lateral hip, greater trochanter. ROM:  Limited due to pain  Strength:  Not tested. Stability:  Not tested. Tests:  No pertinent positive or negative tests. Neurovascular:  Sensation intact in DP/SP/Mahajan/Sa/T nerve distributions. 2+ DP & PT pulses. Gait:  Antalgic. Studies Reviewed  I have personally reviewed pertinent films in PACS. XR of left hip - 09/07/2023 - No acute fracture or dislocation. Moderate degenerative changes in the left hip. Degenerative changes visualized in the lower lumbar spine. Prior total right hip arthoplasty hardware noted. Large joint arthrocentesis: L greater trochanteric bursa  Universal Protocol:  Consent: Verbal consent obtained. Consent given by: patient  Timeout called at: 9/18/2023 11:00 AM.  Supporting Documentation  Indications: pain   Procedure Details  Location: hip - L greater trochanteric bursa  Needle size: 22 G  Ultrasound guidance: no  Approach: lateral  Medications administered: 4 mL bupivacaine 0.25 %; 1 mL methylPREDNISolone acetate 40 mg/mL    Patient tolerance: patient tolerated the procedure well with no immediate complications           Medical, Surgical, Family, and Social History  The patient's medical history, family history, and social history, were reviewed and updated as appropriate.     Past Medical History:   Diagnosis Date   • Adrenal insufficiency (Andrés's disease) ([address])    • Anxiety    • Arthritis    • Bipolar disorder    • Cervical radiculopathy    • Chronic back pain    • Chronic pain disorder     lumbar   • Depression    • DVT, lower extremity ([address])     1991 and 2019 now on eliquis   • Exercises 5 to 6 times per week     5 days per week with weights/band and also goes to PT 2x/week   • Fibromyalgia    • History of Clostridioides difficile infection    • History of MRSA infection     pt denies having this   • History of recent fall 07/2021    "possibly injured left shoulder'   • History of TIAs     cannot remember details   • Hypertension    • Hypokalemia    • Left shoulder pain     "not too bad" goes to PT 2x/week   • Migraine    • Psychiatric disorder     Anxiety, major depression, bipolar   • Spinal stenosis    • Stroke [facility])     pt reports" not remembering having a stroke"   • Syncope 2014    orthostatic hypotension   • Wears dentures     upper   • Wears glasses        Past Surgical History:   Procedure Laterality Date   • APPENDECTOMY     • CAST APPLICATION Left 81/07/1497    Procedure: Application short-arm splint;  Surgeon: Erika Bustamante MD;  [facility]: [facility];  Service: Orthopedics   • COLONOSCOPY     • FL INJECTION LEFT SHOULDER (ARTHROGRAM)  11/10/2021   • GASTRIC RESTRICTION SURGERY      Gastric Sleeve Nov 2015   • HYSTERECTOMY      DONALD   • JOINT REPLACEMENT      Left Knee 2011 and Right Hip 2010   • OOPHORECTOMY     • ORIF WRIST FRACTURE Left 07/04/2020    Procedure: Open reduction and internal fixation left radius and ulnar shaft fracture;  Surgeon: Erika Bustamante MD;  [facility];  Service: Orthopedics   • CA ARTHROPLASTY GLENOHUMERAL JOINT TOTAL SHOULDER Left 03/30/2021    Procedure: ARTHROPLASTY SHOULDER - anatomic;  Surgeon: Christina Horn MD;  Location[facility];  Service: Orthopedics   • CA NATIVIDAD SHOULDER ARTHRPLSTY HUMERAL&GLENOID COMPNT Left 12/21/2021    Procedure: REVISION OF TOTAL SHOULDER ARTHROPLASTY TO REVERSE TOTAL SHOULDER ARTHROPLASTY;  Surgeon: Kadie Abarca MD;  Location[facility];  Service: Orthopedics       Family History   Problem Relation Age of Onset   • Breast cancer Mother 40   • Migraines Mother    • Arthritis Mother    • Kidney disease Father    • Heart disease Father    • Diabetes Father    • Arthritis Father    • Brain cancer Brother    • Seizures Brother        Social History     Occupational History   • Not on file   Tobacco Use   • Smoking status: Former     Packs/day: 0.20     Years: 20.00     Total pack years: 4.00     Types: Cigarettes     Start date: 0     Quit date: 2008     Years since quitting: 15.7   • Smokeless tobacco: Never   • Tobacco comments:     quit   Vaping Use   • Vaping Use: Never used   Substance and Sexual Activity   • Alcohol use: Not Currently     Alcohol/week: 2.0 standard drinks of alcohol     Types: 1 Glasses of wine, 1 Standard drinks or equivalent per week     Comment: occasionally   • Drug use: Never     Comment: na   • Sexual activity: Not on file     Comment: defer       Allergies   Allergen Reactions   • Ketorolac Itching and Other (See Comments)     [toradol] Itching, hives   • Mushroom Extract Complex - Food Allergy Hives         Current Outpatient Medications:   •  amLODIPine (NORVASC) 5 mg tablet, Take 5 mg by mouth daily, Disp: , Rfl:   •  apixaban (Eliquis) 5 mg, Take 1 tablet (5 mg total) by mouth 2 (two) times a day, Disp: 180 tablet, Rfl: 3  •  ARIPiprazole (ABILIFY) 5 mg tablet, TAKE 1 TABLET EVERY MORNING (CORRECTION BY DR OF PREV SCRIPT), Disp: , Rfl:   •  atorvastatin (LIPITOR) 40 mg tablet, TAKE 1 TABLET BY MOUTH EVERY DAY AT NIGHT, Disp: , Rfl:   •  B-D INSULIN SYRINGE 1CC/25GX1" 25G X 1" 1 ML MISC, USE WITH DHE, Disp: , Rfl:   •  celecoxib (CeleBREX) 200 mg capsule, Take 1 capsule (200 mg total) by mouth daily, Disp: 30 capsule, Rfl: 1  •  cholecalciferol (VITAMIN D3) 1,000 units tablet, , Disp: , Rfl:   •  dexamethasone (DECADRON) 4 mg tablet, PLEASE SEE ATTACHED FOR DETAILED DIRECTIONS, Disp: , Rfl:   •  dihydroergotamine (DHE) 1 mg/mL, INJECT 1 ML (1 MG TOTAL) INJECT INTO THE MUSCLE AS NEEDED FOR MIGRAINE., Disp: , Rfl:   •  furosemide (LASIX) 20 mg tablet, Take 20 mg by mouth as needed Pt reports calls her [facility] cardiologist Dr Simran Jefferson before taking , Disp: , Rfl:   •  gabapentin (NEURONTIN) 600 MG tablet, Take 600 mg by mouth 3 (three) times a day, Disp: , Rfl:   •  hydrOXYzine HCL (ATARAX) 25 mg tablet, Take 2 tablets (50 mg total) by mouth daily at bedtime as needed (insomnia), Disp: 30 tablet, Rfl: 0  •  lamoTRIgine (LaMICtal) 200 MG tablet, Take 200 mg by mouth daily. , Disp: , Rfl:   •  meclizine (ANTIVERT) 12.5 MG tablet, Take 1 tablet (12.5 mg total) by mouth every 8 (eight) hours as needed for dizziness for up to 7 days, Disp: 21 tablet, Rfl: 0  •  methocarbamol (ROBAXIN) 750 mg tablet, TAKE 1 TABLET BY MOUTH 3 TIMES A DAY AS NEEDED FOR MUSCLE SPASMS., Disp: , Rfl:   •  ondansetron (ZOFRAN-ODT) 4 mg disintegrating tablet, TAKE 1 TABLET BY MOUTH EVERY 8 HOURS AS NEEDED FOR NAUSEA AND VOMITING, Disp: , Rfl:   •  potassium chloride (K-DUR,KLOR-CON) 20 mEq tablet, Take 20 mEq by mouth daily  , Disp: , Rfl:   •  Trudhesa 0.725 MG/ACT AERS, , Disp: , Rfl:   •  Ubrelvy 100 MG tablet, TAKE 1 TABLET TWICE A DAY AS NEEDED FOR HEADACHE, LIMIT 16 TABS PER MONTH, Disp: , Rfl:   •  LORazepam (ATIVAN) 0.5 mg tablet, Take 0.5 mg by mouth daily as needed (Patient not taking: Reported on 9/6/2023), Disp: , Rfl:       [employer]    Scribe Attestation    I,:  [employer] am acting as a scribe while in the presence of the attending physician.:       I,:  Alize Vilchis MD personally performed the services described in this documentation    as scribed in my presence.:

## 2023-09-19 ENCOUNTER — PATIENT OUTREACH (OUTPATIENT)
Dept: CASE MANAGEMENT | Facility: OTHER | Age: 63
End: 2023-09-19

## 2023-09-19 NOTE — PROGRESS NOTES
In basket ADT notifications received 9/18 for patient being treated and released from [facility]) Emergency Department. Cele Smith presented with c/o feeling off balance, shortness of breath and chest pain that radiates to her back. Troponin level normal.  CXR did not yield any acute findings nor did CTA of chest/abd/pelvis. Symptoms resolved with oral medications:  Antivert, tylenol, Maalox and IV medications:  Pepcid, Zofran and Reglan. She discharged home with instructions to f/u with PCP. Unsuccessful attempt at reaching patient this afternoon for utilization follow up call. Left name, call back number and message encouraging return call of this RN  Message included reminder for PAT 9/22 @ 1pm at [facility].  Will remain available to assist should patient call back.

## 2023-09-21 DIAGNOSIS — K95.89 IRON DEFICIENCY ANEMIA FOLLOWING BARIATRIC SURGERY: ICD-10-CM

## 2023-09-21 DIAGNOSIS — D50.8 IRON DEFICIENCY ANEMIA FOLLOWING BARIATRIC SURGERY: ICD-10-CM

## 2023-09-21 DIAGNOSIS — E53.8 B12 DEFICIENCY: Primary | ICD-10-CM

## 2023-09-22 ENCOUNTER — APPOINTMENT (OUTPATIENT)
Dept: LAB | Facility: MEDICAL CENTER | Age: 63
End: 2023-09-22
Payer: COMMERCIAL

## 2023-09-22 ENCOUNTER — LAB REQUISITION (OUTPATIENT)
Dept: LAB | Facility: HOSPITAL | Age: 63
End: 2023-09-22
Payer: COMMERCIAL

## 2023-09-22 DIAGNOSIS — M19.011 PRIMARY OSTEOARTHRITIS OF RIGHT SHOULDER: ICD-10-CM

## 2023-09-22 DIAGNOSIS — M19.011 PRIMARY OSTEOARTHRITIS, RIGHT SHOULDER: ICD-10-CM

## 2023-09-22 LAB
ABO GROUP BLD: NORMAL
ALBUMIN SERPL BCP-MCNC: 4 G/DL (ref 3.5–5)
ALP SERPL-CCNC: 143 U/L (ref 34–104)
ALT SERPL W P-5'-P-CCNC: 11 U/L (ref 7–52)
ANION GAP SERPL CALCULATED.3IONS-SCNC: 7 MMOL/L
AST SERPL W P-5'-P-CCNC: 13 U/L (ref 13–39)
BASOPHILS # BLD AUTO: 0.08 THOUSANDS/ÂΜL (ref 0–0.1)
BASOPHILS NFR BLD AUTO: 1 % (ref 0–1)
BILIRUB SERPL-MCNC: 0.61 MG/DL (ref 0.2–1)
BLD GP AB SCN SERPL QL: NEGATIVE
BUN SERPL-MCNC: 10 MG/DL (ref 5–25)
CALCIUM SERPL-MCNC: 9.2 MG/DL (ref 8.4–10.2)
CHLORIDE SERPL-SCNC: 96 MMOL/L (ref 96–108)
CO2 SERPL-SCNC: 29 MMOL/L (ref 21–32)
CREAT SERPL-MCNC: 0.97 MG/DL (ref 0.6–1.3)
EOSINOPHIL # BLD AUTO: 0.17 THOUSAND/ÂΜL (ref 0–0.61)
EOSINOPHIL NFR BLD AUTO: 1 % (ref 0–6)
ERYTHROCYTE [DISTWIDTH] IN BLOOD BY AUTOMATED COUNT: 14.3 % (ref 11.6–15.1)
EST. AVERAGE GLUCOSE BLD GHB EST-MCNC: 128 MG/DL
GFR SERPL CREATININE-BSD FRML MDRD: 61 ML/MIN/1.73SQ M
GLUCOSE P FAST SERPL-MCNC: 94 MG/DL (ref 65–99)
HBA1C MFR BLD: 6.1 %
HCT VFR BLD AUTO: 41.1 % (ref 34.8–46.1)
HGB BLD-MCNC: 13.4 G/DL (ref 11.5–15.4)
IMM GRANULOCYTES # BLD AUTO: 0.17 THOUSAND/UL (ref 0–0.2)
IMM GRANULOCYTES NFR BLD AUTO: 1 % (ref 0–2)
LYMPHOCYTES # BLD AUTO: 2.27 THOUSANDS/ÂΜL (ref 0.6–4.47)
LYMPHOCYTES NFR BLD AUTO: 16 % (ref 14–44)
MCH RBC QN AUTO: 29.7 PG (ref 26.8–34.3)
MCHC RBC AUTO-ENTMCNC: 32.6 G/DL (ref 31.4–37.4)
MCV RBC AUTO: 91 FL (ref 82–98)
MONOCYTES # BLD AUTO: 1.26 THOUSAND/ÂΜL (ref 0.17–1.22)
MONOCYTES NFR BLD AUTO: 9 % (ref 4–12)
NEUTROPHILS # BLD AUTO: 10.16 THOUSANDS/ÂΜL (ref 1.85–7.62)
NEUTS SEG NFR BLD AUTO: 72 % (ref 43–75)
NRBC BLD AUTO-RTO: 0 /100 WBCS
PLATELET # BLD AUTO: 510 THOUSANDS/UL (ref 149–390)
PMV BLD AUTO: 9.6 FL (ref 8.9–12.7)
POTASSIUM SERPL-SCNC: 4.7 MMOL/L (ref 3.5–5.3)
PROT SERPL-MCNC: 7.2 G/DL (ref 6.4–8.4)
RBC # BLD AUTO: 4.51 MILLION/UL (ref 3.81–5.12)
RH BLD: POSITIVE
SODIUM SERPL-SCNC: 132 MMOL/L (ref 135–147)
SPECIMEN EXPIRATION DATE: NORMAL
WBC # BLD AUTO: 14.11 THOUSAND/UL (ref 4.31–10.16)

## 2023-09-22 PROCEDURE — 85025 COMPLETE CBC W/AUTO DIFF WBC: CPT

## 2023-09-22 PROCEDURE — 86900 BLOOD TYPING SEROLOGIC ABO: CPT | Performed by: ORTHOPAEDIC SURGERY

## 2023-09-22 PROCEDURE — 36415 COLL VENOUS BLD VENIPUNCTURE: CPT

## 2023-09-22 PROCEDURE — 86850 RBC ANTIBODY SCREEN: CPT | Performed by: ORTHOPAEDIC SURGERY

## 2023-09-22 PROCEDURE — 83036 HEMOGLOBIN GLYCOSYLATED A1C: CPT

## 2023-09-22 PROCEDURE — 80053 COMPREHEN METABOLIC PANEL: CPT

## 2023-09-22 PROCEDURE — 86901 BLOOD TYPING SEROLOGIC RH(D): CPT | Performed by: ORTHOPAEDIC SURGERY

## 2023-09-25 ENCOUNTER — TELEPHONE (OUTPATIENT)
Dept: RADIOLOGY | Facility: MEDICAL CENTER | Age: 63
End: 2023-09-25

## 2023-09-25 NOTE — TELEPHONE ENCOUNTER
Left Message for Patient to call back to schedule New Patient consult with either Dr Rebecca Mao in [facility] or Dr FRENCHS [facility] in [facility].

## 2023-09-25 NOTE — TELEPHONE ENCOUNTER
Farheen Agustina is a patient of Dr Crissy Aden at [facility] requesting to transfer care because all of her other physicians are through [facility]. Records from [facility] are available to review in her chart through care everywhere. Please review and advise about appointment scheduling.   Thank you
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
## 2023-09-27 PROBLEM — E66.9 OBESITY (BMI 30-39.9): Status: ACTIVE | Noted: 2019-10-16

## 2023-09-27 PROBLEM — G43.019 INTRACTABLE MIGRAINE WITHOUT AURA AND WITHOUT STATUS MIGRAINOSUS: Status: RESOLVED | Noted: 2019-10-02 | Resolved: 2023-09-27

## 2023-09-27 PROBLEM — Z01.818 PREOPERATIVE CLEARANCE: Status: ACTIVE | Noted: 2023-09-27

## 2023-09-27 PROBLEM — S27.322A BILATERAL PULMONARY CONTUSION: Status: RESOLVED | Noted: 2020-07-03 | Resolved: 2023-09-27

## 2023-09-27 PROBLEM — S06.9X1A CLOSED HEAD INJURY WITH BRIEF LOSS OF CONSCIOUSNESS (HCC): Status: RESOLVED | Noted: 2019-10-15 | Resolved: 2023-09-27

## 2023-09-27 PROBLEM — S00.03XA: Status: RESOLVED | Noted: 2020-07-03 | Resolved: 2023-09-27

## 2023-09-27 PROBLEM — R60.0 LOWER EXTREMITY EDEMA: Status: RESOLVED | Noted: 2020-03-09 | Resolved: 2023-09-27

## 2023-09-27 PROBLEM — S60.221A CONTUSION OF RIGHT HAND: Status: RESOLVED | Noted: 2021-07-03 | Resolved: 2023-09-27

## 2023-09-27 NOTE — PROGRESS NOTES
Internal Medicine Pre-Operative Evaluation:     Reason for Visit: Pre-operative Evaluation for Risk Stratification and Optimization    Patient ID: Gabriel Adorno is a 57 y.o. female. Surgery: Arthroplasty of right Shoulder  Referring Provider: Dr. Leora Vu      Recommendations to Proceed withSurgery    Patient is considered to be Low risk for Medium risk procedure. After evaluation and discussion with patient with emphasis that all surgery has some degree of inherent risk it is determined this procedure is of acceptable risk  medically.     Patient may proceed with planned procedure      Assessment      Primary osteoarthritis right Shoulder  • Failed conservative measures  • Electing to undergo total joint arthroplasty    Pre-operative Medical Evaluation for planned surgery  • Patient meets preoperative quality goals as noted below  • Recommendations as listed in PLAN section below  • Contact surgical nurse  navigator with any questions regarding preoperative plan or schedule    H/o DVT/LLE and PE  · F/b hematology  · Lifelong eliquis  · OK to hold 2 days prior to surgery per their reccs    Hypothyroid  • Cont levothyroxine as prescribed    Anemia  · Continue B 12 infusions    Impaired fasting glucose  • Hgb A1c 6.1  • Recommend following DM diet  • Monitor FBS    Chronic LE edema  · Hold lasix and kcl the morning of surgery    Obesity  • Recommend ongoing attempts at weight loss  • Current BMI 37    Mood disorder  · Continue medications as prescribed        Patient Active Problem List   Diagnosis   • Bipolar depression ([address])   • Hypokalemia   • Vertigo   • Fibromyalgia   • Osteoarthritis of lumbosacral spine without myelopathy   • HLD (hyperlipidemia)   • Orthostatic hypotension   • History of TIAs   • History of migraine   • Neck pain   • Low back pain   • PE (pulmonary thromboembolism) (HCC)   • Hypertension   • Bipolar II disorder (HCC)   • Chronic back pain   • Anxiety   • Leukocytosis   • Chronic anticoagulation   • Anemia   • Ambulatory dysfunction   • Edema   • Closed fracture of distal ends of left radius and ulna   • Fracture of multiple ribs of right side   • History of anxiety   • History of pulmonary embolism   • Right distal ulnar fracture   • Primary osteoarthritis of left shoulder   • Status post total replacement of left shoulder   • Alkalosis   • Primary osteoarthritis of right shoulder   • S/P reverse total shoulder arthroplasty, left   • Iron deficiency anemia following bariatric surgery   • B12 deficiency   • Bilateral occipital neuralgia   • Closed fracture of styloid process of radius   • Fatigue   • Anticoagulated by anticoagulation treatment   • History of peptic ulcer disease   • Hyperglycemia   • Hypersomnia   • Intractable vomiting with nausea   • Lower leg DVT (deep venous thromboembolism), acute, right (HCC)   • Lumbar spondylosis   • Migraine aura, persistent   • Mild episode of recurrent major depressive disorder ([facility])   • Myofascial pain   • Peripheral neuropathy   • Recurrent falls   • Right hip pain   • Subclinical hyperthyroidism   • Thoracic or lumbosacral neuritis or radiculitis   • Benzodiazepine dependence ([facility])   • Weakness of both lower extremities   • Postmenopausal   • Obesity (BMI 30-39. 9)   • Preoperative clearance        Plan:     1. Further preoperative workup as follows:   - none no further testing required may proceed with surgery    2. Medication Management/Recommendations:   - hold diuretic / water pill  morning of surgery unless given other instructions by your provider  - hold aspirin 7 days prior to surgery  - hold NOAC/DOAC 3 days prior to surgery eliquis  - avoid use of NSAID such as motrin, advil, aleve for 7 days prior to surgery  - hold all OTC herbal or nutritional supplements 7 days before surgery    3. Patient requires further consultation with:   No Consults Required    4.  Discharge Planning / Barriers to Discharge  none identified - patients has post discharge therapy plan in place, transportation arranged for discharge day, adequate family support at home to assist with discharge to home. Subjective:           History of Present Illness:     Edi Salmon is a 57 y.o. female who presents to the office today for a preoperative consultation at the request of surgeon. The patient understands this is an elective procedure and not emergent. They are electing to undergo planned procedure with an understanding that all surgery has inherent risk. They have worked with their surgeon and failed conservative treatment measures. Today they present for preoperative risk assessment and recommendations for optimization in preparation for surgery. Pt seen in surgical optimization center for upcoming proposed surgery. They have failed previous conservative measures and have elected surgical intervention. Pt meets presurgical lab and BMI optimization goals. Upon interview questioning patient is able to perform greater than 4 METs workload in daily life without any reported cardio-pulmonary symptoms. Pt had a recent hospitalization for chest pain. All testing was negative. She has a h/o of LLE DVT/PE and takes eliquis per hematology for life. They recommend that she hold it 2 days prior to surgery instead of 3 and resume POD#1. ROS: No TIA's or unusual headaches, no dysphagia. No prolonged cough. No dyspnea or chest pain on exertion. No abdominal pain, change in bowel habits, black or bloody stools. No urinary tract or BPH symptoms. Positive reported pain in arthritic joint. Positive difficulty with gait. No skin rashes or issues.             Objective:      /84   Ht 5' 8" (1.727 m)   Wt 112 kg (248 lb)   BMI 37.71 kg/m²       General Appearance: no distress, conversive  HEENT: PERRLA, conjuctiva normal; oropharynx clear; mucous membranes moist;   Neck:  Supple, no lymphadenopathy or thyromegaly  Lungs: breath sounds normal, normal respiratory effort, no retractions, expiratory effort normal  CV: normal heart sounds S1/S2, PMI normal   ABD: soft non tender, no masses , no hepatic or splenomegaly  EXT: DP pulses intact, no lymphadenopathy, no edema  Skin: normal turgor, normal texture, no rash  Psych: affect normal, mood normal  Neuro: AAOx3        The following portions of the patient's history were reviewed and updated as appropriate: allergies, current medications, past family history, past medical history, past social history, past surgical history and problem list.     Past History:       Past Medical History:   Diagnosis Date   • Adrenal insufficiency (Burleson's disease) ([address])    • Anxiety    • Arthritis    • Bilateral pulmonary contusion 7/3/2020   • Bipolar disorder    • Cervical radiculopathy    • Chest pain     seen in ER 9/18, dx with gas   • Chronic back pain    • Chronic pain disorder     lumbar   • Closed head injury with brief loss of consciousness ([address]) 10/15/2019   • Contusion of right hand 7/3/2021   • Depression    • DVT, lower extremity ([address])     1991 and 2019 now on eliquis   • Exercises 5 to 6 times per week     5 days per week with weights/band and also goes to PT 2x/week   • Fall down stairs 7/3/2020   • Fibromyalgia    • Hematoma of parietal scalp 7/3/2020   • History of Clostridioides difficile infection    • History of MRSA infection     pt denies having this   • History of recent fall 07/2021    "possibly injured left shoulder'   • History of TIAs     cannot remember details   • Hypertension    • Hypokalemia    • Intractable migraine without aura and without status migrainosus 10/2/2019   • Left shoulder pain     "not too bad" goes to PT 2x/week   • Migraine    • Psychiatric disorder     Anxiety, major depression, bipolar   • Spinal stenosis    • Stroke [facility])     pt reports" not remembering having a stroke"   • Syncope 2014    orthostatic hypotension   • Wears dentures     upper   • Wears glasses     Past Surgical History:   Procedure Laterality Date   • APPENDECTOMY     • CAST APPLICATION Left 27/20/1587    Procedure: Application short-arm splint;  Surgeon: Isabel Dumont MD;  [facility];  Service: Orthopedics   • COLONOSCOPY     • FL INJECTION LEFT SHOULDER (ARTHROGRAM)  11/10/2021   • GASTRIC RESTRICTION SURGERY      Gastric Sleeve Nov 2015   • HYSTERECTOMY      DONALD   • JOINT REPLACEMENT      Left Knee 2011 and Right Hip 2010   • OOPHORECTOMY     • ORIF WRIST FRACTURE Left 07/04/2020    Procedure: Open reduction and internal fixation left radius and ulnar shaft fracture;  Surgeon: Isabel Dumont MD;  [facility];  Service: Orthopedics   • [place] ARTHROPLASTY GLENOHUMERAL JOINT TOTAL SHOULDER Left 03/30/2021    Procedure: ARTHROPLASTY SHOULDER - anatomic;  Surgeon: Lisa Beyer MD;  [facility];  Service: Orthopedics   • [place] NATIVIDAD SHOULDER ARTHRPLSTY HUMERAL&GLENOID COMPNT Left 12/21/2021    Procedure: REVISION OF TOTAL SHOULDER ARTHROPLASTY TO REVERSE TOTAL SHOULDER ARTHROPLASTY;  Surgeon: Thomas Venegas MD;  [facility];  Service: Orthopedics          Social History     Tobacco Use   • Smoking status: Former     Packs/day: 0.20     Years: 20.00     Total pack years: 4.00     Types: Cigarettes     Start date: 0     Quit date: 2008     Years since quitting: 15.7   • Smokeless tobacco: Never   • Tobacco comments:     quit   Vaping Use   • Vaping Use: Never used   Substance Use Topics   • Alcohol use: Not Currently     Alcohol/week: 2.0 standard drinks of alcohol     Types: 1 Glasses of wine, 1 Standard drinks or equivalent per week     Comment: occasionally   • Drug use: Never     Comment: na     Family History   Problem Relation Age of Onset   • Breast cancer Mother 40   • Migraines Mother    • Arthritis Mother    • Kidney disease Father    • Heart disease Father    • Diabetes Father    • Arthritis Father    • Brain cancer Brother    • Seizures Brother Allergies: Allergies   Allergen Reactions   • Ketorolac Itching and Other (See Comments)     [toradol] Itching, hives   • Mushroom Extract Complex - Food Allergy Hives        Current Medications:     Current Outpatient Medications   Medication Instructions   • amLODIPine (NORVASC) 5 mg, Oral, Daily   • apixaban (ELIQUIS) 5 mg, Oral, 2 times daily   • ARIPiprazole (ABILIFY) 5 mg tablet TAKE 1 TABLET EVERY MORNING (CORRECTION BY DR OF PREV SCRIPT)   • atorvastatin (LIPITOR) 40 mg tablet TAKE 1 TABLET BY MOUTH EVERY DAY AT NIGHT   • B-D INSULIN SYRINGE 1CC/25GX1" 25G X 1" 1 ML MISC USE WITH DHE   • celecoxib (CELEBREX) 200 mg, Oral, Daily   • cholecalciferol (VITAMIN D3) 1,000 units tablet No dose, route, or frequency recorded. • dihydroergotamine (DHE) 1 mg/mL INJECT 1 ML (1 MG TOTAL) INJECT INTO THE MUSCLE AS NEEDED FOR MIGRAINE. • furosemide (LASIX) 20 mg, Oral, As needed, Pt reports calls her [facility] cardiologist Dr Cherylene Province before taking   • gabapentin (NEURONTIN) 600 mg, Oral, 3 times daily   • hydrOXYzine HCL (ATARAX) 50 mg, Oral, Daily at bedtime PRN   • lamoTRIgine (LAMICTAL) 200 mg, Oral, Daily   • meclizine (ANTIVERT) 12.5 mg, Oral, Every 8 hours PRN   • methocarbamol (ROBAXIN) 750 mg tablet TAKE 1 TABLET BY MOUTH 3 TIMES A DAY AS NEEDED FOR MUSCLE SPASMS. • ondansetron (ZOFRAN-ODT) 4 mg disintegrating tablet TAKE 1 TABLET BY MOUTH EVERY 8 HOURS AS NEEDED FOR NAUSEA AND VOMITING   • potassium chloride (K-DUR,KLOR-CON) 20 mEq tablet 20 mEq, Oral, Daily   • Trudhesa 0.725 MG/ACT AERS No dose, route, or frequency recorded. • Ubrelvy 100 MG tablet TAKE 1 TABLET TWICE A DAY AS NEEDED FOR HEADACHE, LIMIT 16 TABS PER MONTH             PRE-OP WORKSHEET DATA    Assessment of Pre-Operative Risks     MLJ Quality Hard Stops:  BMI (<40) : Estimated body mass index is 37.71 kg/m² as calculated from the following:    Height as of this encounter: 5' 8" (1.727 m). Weight as of this encounter: 112 kg (248 lb).     Hgb ( >11): Lab Results   Component Value Date    HGB 13.4 09/22/2023     HbA1c (<7.0) :   Lab Results   Component Value Date    HGBA1C 6.1 (H) 09/22/2023     GFR (>60) (Less then 45 = Nephrology consult):  CrCl cannot be calculated (Patient's most recent lab result is older than the maximum 7 days allowed. ). Active Decompensated Chronic Conditions which would delay surgery  No acutely decompensated medical issues such as recent CVA, MI, new onset arrhythmia, severe aortic stenosis, CHF, uncontrolled COPD       Exercise Capacity: (if less the 4 mets consider functional assessment via cardiac stress testing or consultation)    · Able to walk 2 blocks without symptoms?: Yes  · Able to walk 1 flights without symptoms?: Yes    Assessment of intra and post operative respiratory, hemodynamic and thrombotic risks     Prior Anesthesia Reactions: No     Personal history of venous thromboembolic disease? Yes    History of steroid use > 5 mg for >2 weeks within last year? No    Cardiac Risk Estimation: per the Revised Cardiac Risk Index (Circ. 100:1043, 1999),     The patient's risk factors for cardiac complications include :  none    Reginaldo Byrd has a in hospital cardiac risk of RCI RISK CLASS I (0 risk factors, risk of major cardiac compl. appr. 0.5%) based on RCRI calculator          Pre-Op Data Reviewed:       Laboratory Results: I have personally reviewed the pertinent laboratory results/reports     EKG:I have personally reviewed pertinent reports.   . I personally reviewed and interpreted available tracings in the electronic medical record    Normal sinus rhythm  Voltage criteria for left ventricular hypertrophy  Nonspecific ST abnormality  Abnormal ECG  When compared with ECG of 18-SEP-2023 11:51,  Inverted T waves have replaced nonspecific T wave abnormality in Inferior leads  Confirmed by Darnell Mcdonald ([id number]) on 9/18/2023     OLD RECORDS: reviewed old records in the chart review section if EHR on day of visit.    Previous cardiopulmonary studies within the past year:  · Echocardiogram: no  · Cardiac Catheterization: no  · Stress Test: no      Time of visit including pre-visit chart review, visit and post-visit coordination of plan and care , review of pre-surgical lab work, preparation and time spent documenting note in electronic medical record, time spent face-to-face in physical examination answering patient questions by care team 55 minutes             [address] [facility]

## 2023-09-27 NOTE — PRE-PROCEDURE INSTRUCTIONS
Pre-Surgery Instructions:   Medication Instructions   • amLODIPine (NORVASC) 5 mg tablet Take day of surgery. • apixaban (Eliquis) 5 mg p/pt hold x 3 days, Dr. Sapphire Justice office/NP   • ARIPiprazole (ABILIFY) 5 mg tablet Take day of surgery. • atorvastatin (LIPITOR) 40 mg tablet Take night before surgery   • celecoxib (CeleBREX) 200 mg capsule Stop taking 7 days prior to surgery. • cholecalciferol (VITAMIN D3) 1,000 units tablet Stop taking 7 days prior to surgery. • gabapentin (NEURONTIN) 600 MG tablet Take day of surgery. • hydrOXYzine HCL (ATARAX) 25 mg tablet Uses PRN- OK to take day of surgery   • lamoTRIgine (LaMICtal) 200 MG tablet Take day of surgery. • Ubrelvy 100 MG tablet Uses PRN- OK to take day of surgery   Medication instructions for day surgery reviewed. Please use only a sip of water to take your instructed medications. Avoid all over the counter vitamins, supplements and NSAIDS for one week prior to surgery per anesthesia guidelines. Tylenol is ok to take as needed. You will receive a call one business day prior to surgery with an arrival time and hospital directions. If your surgery is scheduled on a Monday, the hospital will be calling you on the Friday prior to your surgery. If you have not heard from anyone by 8pm, please call the hospital supervisor through the hospital  at [phone]. Misty Roberts [phone]). Do not eat or drink anything after midnight the night before your surgery, including candy, mints, lifesavers, or chewing gum. Do not drink alcohol 24hrs before your surgery. Try not to smoke at least 24hrs before your surgery. Follow the pre surgery showering instructions as listed in the [facility] Surgical Experience Booklet” or otherwise provided by your surgeon's office. Do not shave the surgical area 24 hours before surgery. Do not apply any lotions, creams, including makeup, cologne, deodorant, or perfumes after showering on the day of your surgery.      No contact lenses, eye make-up, or artificial eyelashes. Remove nail polish, including gel polish, and any artificial, gel, or acrylic nails if possible. Remove all jewelry including rings and body piercing jewelry. Wear causal clothing that is easy to take on and off. Consider your type of surgery. Keep any valuables, jewelry, piercings at home. Please bring any specially ordered equipment (sling, braces) if indicated. Arrange for a responsible person to drive you to and from the hospital on the day of your surgery. Visitor Guidelines discussed. Call the surgeon's office with any new illnesses, exposures, or additional questions prior to surgery. Please reference your [facility] Surgical Experience Booklet” for additional information to prepare for your upcoming surgery. Pt. Verbalized an understanding of all instructions reviewed and offers no concerns at this time. Reviewed with patient, in detail, instructions from "My Surgical Experience". Verified with patient that he received TMLJ patient education from surgeon's office. Advised to call ortho office/surgery coordinator with any questions and/or concerns. Confirmed that patient has hibiclens soap and CHG wipes. Incentive spirometer received. Patient verbalized understanding of current visitor restrictions due to Covid and will clarify with nurse DOS. Instructed to avoid all ASA and OTC Vit/Supp 1 week prior to surgery and to avoid NSAIDs 7 days prior to surgery per anesthesia guidelines. Tylenol ok to take prn. No alcohol 24 hours prior to surgery. Patient aware Lovenox or other Blood Thinner prescribed is for POST OP ONLY. Instructed to call surgeon's office in meantime with any new illness. Patient verbalized an understanding of all instructions reviewed and offers no concerns at this time.

## 2023-09-27 NOTE — H&P (VIEW-ONLY)
Internal Medicine Pre-Operative Evaluation:     Reason for Visit: Pre-operative Evaluation for Risk Stratification and Optimization    Patient ID: Gabriel Adorno is a 57 y.o. female. Surgery: Arthroplasty of right Shoulder  Referring Provider: Dr. Leora Vu      Recommendations to Proceed withSurgery    Patient is considered to be Low risk for Medium risk procedure. After evaluation and discussion with patient with emphasis that all surgery has some degree of inherent risk it is determined this procedure is of acceptable risk  medically.     Patient may proceed with planned procedure      Assessment      Primary osteoarthritis right Shoulder  • Failed conservative measures  • Electing to undergo total joint arthroplasty    Pre-operative Medical Evaluation for planned surgery  • Patient meets preoperative quality goals as noted below  • Recommendations as listed in PLAN section below  • Contact surgical nurse  navigator with any questions regarding preoperative plan or schedule    H/o DVT/LLE and PE  · F/b hematology  · Lifelong eliquis  · OK to hold 2 days prior to surgery per their reccs    Hypothyroid  • Cont levothyroxine as prescribed    Anemia  · Continue B 12 infusions    Impaired fasting glucose  • Hgb A1c 6.1  • Recommend following DM diet  • Monitor FBS    Chronic LE edema  · Hold lasix and kcl the morning of surgery    Obesity  • Recommend ongoing attempts at weight loss  • Current BMI 37    Mood disorder  · Continue medications as prescribed        Patient Active Problem List   Diagnosis   • Bipolar depression ([address])   • Hypokalemia   • Vertigo   • Fibromyalgia   • Osteoarthritis of lumbosacral spine without myelopathy   • HLD (hyperlipidemia)   • Orthostatic hypotension   • History of TIAs   • History of migraine   • Neck pain   • Low back pain   • PE (pulmonary thromboembolism) (HCC)   • Hypertension   • Bipolar II disorder (HCC)   • Chronic back pain   • Anxiety   • Leukocytosis   • Chronic anticoagulation   • Anemia   • Ambulatory dysfunction   • Edema   • Closed fracture of distal ends of left radius and ulna   • Fracture of multiple ribs of right side   • History of anxiety   • History of pulmonary embolism   • Right distal ulnar fracture   • Primary osteoarthritis of left shoulder   • Status post total replacement of left shoulder   • Alkalosis   • Primary osteoarthritis of right shoulder   • S/P reverse total shoulder arthroplasty, left   • Iron deficiency anemia following bariatric surgery   • B12 deficiency   • Bilateral occipital neuralgia   • Closed fracture of styloid process of radius   • Fatigue   • Anticoagulated by anticoagulation treatment   • History of peptic ulcer disease   • Hyperglycemia   • Hypersomnia   • Intractable vomiting with nausea   • Lower leg DVT (deep venous thromboembolism), acute, right (HCC)   • Lumbar spondylosis   • Migraine aura, persistent   • Mild episode of recurrent major depressive disorder ([facility])   • Myofascial pain   • Peripheral neuropathy   • Recurrent falls   • Right hip pain   • Subclinical hyperthyroidism   • Thoracic or lumbosacral neuritis or radiculitis   • Benzodiazepine dependence ([facility])   • Weakness of both lower extremities   • Postmenopausal   • Obesity (BMI 30-39. 9)   • Preoperative clearance        Plan:     1. Further preoperative workup as follows:   - none no further testing required may proceed with surgery    2. Medication Management/Recommendations:   - hold diuretic / water pill  morning of surgery unless given other instructions by your provider  - hold aspirin 7 days prior to surgery  - hold NOAC/DOAC 3 days prior to surgery eliquis  - avoid use of NSAID such as motrin, advil, aleve for 7 days prior to surgery  - hold all OTC herbal or nutritional supplements 7 days before surgery    3. Patient requires further consultation with:   No Consults Required    4.  Discharge Planning / Barriers to Discharge  none identified - patients has post discharge therapy plan in place, transportation arranged for discharge day, adequate family support at home to assist with discharge to home. Subjective:           History of Present Illness:     Teresa Vazquez is a 57 y.o. female who presents to the office today for a preoperative consultation at the request of surgeon. The patient understands this is an elective procedure and not emergent. They are electing to undergo planned procedure with an understanding that all surgery has inherent risk. They have worked with their surgeon and failed conservative treatment measures. Today they present for preoperative risk assessment and recommendations for optimization in preparation for surgery. Pt seen in surgical optimization center for upcoming proposed surgery. They have failed previous conservative measures and have elected surgical intervention. Pt meets presurgical lab and BMI optimization goals. Upon interview questioning patient is able to perform greater than 4 METs workload in daily life without any reported cardio-pulmonary symptoms. Pt had a recent hospitalization for chest pain. All testing was negative. She has a h/o of LLE DVT/PE and takes eliquis per hematology for life. They recommend that she hold it 2 days prior to surgery instead of 3 and resume POD#1. ROS: No TIA's or unusual headaches, no dysphagia. No prolonged cough. No dyspnea or chest pain on exertion. No abdominal pain, change in bowel habits, black or bloody stools. No urinary tract or BPH symptoms. Positive reported pain in arthritic joint. Positive difficulty with gait. No skin rashes or issues.             Objective:      /84   Ht 5' 8" (1.727 m)   Wt 112 kg (248 lb)   BMI 37.71 kg/m²       General Appearance: no distress, conversive  HEENT: PERRLA, conjuctiva normal; oropharynx clear; mucous membranes moist;   Neck:  Supple, no lymphadenopathy or thyromegaly  Lungs: breath sounds normal, normal respiratory effort, no retractions, expiratory effort normal  CV: normal heart sounds S1/S2, PMI normal   ABD: soft non tender, no masses , no hepatic or splenomegaly  EXT: DP pulses intact, no lymphadenopathy, no edema  Skin: normal turgor, normal texture, no rash  Psych: affect normal, mood normal  Neuro: AAOx3        The following portions of the patient's history were reviewed and updated as appropriate: allergies, current medications, past family history, past medical history, past social history, past surgical history and problem list.     Past History:       Past Medical History:   Diagnosis Date   • Adrenal insufficiency (Grainger's disease) ([address])    • Anxiety    • Arthritis    • Bilateral pulmonary contusion 7/3/2020   • Bipolar disorder    • Cervical radiculopathy    • Chest pain     seen in ER 9/18, dx with gas   • Chronic back pain    • Chronic pain disorder     lumbar   • Closed head injury with brief loss of consciousness ([address]) 10/15/2019   • Contusion of right hand 7/3/2021   • Depression    • DVT, lower extremity ([address])     1991 and 2019 now on eliquis   • Exercises 5 to 6 times per week     5 days per week with weights/band and also goes to PT 2x/week   • Fall down stairs 7/3/2020   • Fibromyalgia    • Hematoma of parietal scalp 7/3/2020   • History of Clostridioides difficile infection    • History of MRSA infection     pt denies having this   • History of recent fall 07/2021    "possibly injured left shoulder'   • History of TIAs     cannot remember details   • Hypertension    • Hypokalemia    • Intractable migraine without aura and without status migrainosus 10/2/2019   • Left shoulder pain     "not too bad" goes to PT 2x/week   • Migraine    • Psychiatric disorder     Anxiety, major depression, bipolar   • Spinal stenosis    • Stroke [facility])     pt reports" not remembering having a stroke"   • Syncope 2014    orthostatic hypotension   • Wears dentures     upper   • Wears glasses     Past Surgical History:   Procedure Laterality Date   • APPENDECTOMY     • CAST APPLICATION Left 31/73/7200    Procedure: Application short-arm splint;  Surgeon: Grant Donis MD;  [facility];  Service: Orthopedics   • COLONOSCOPY     • FL INJECTION LEFT SHOULDER (ARTHROGRAM)  11/10/2021   • GASTRIC RESTRICTION SURGERY      Gastric Sleeve Nov 2015   • HYSTERECTOMY      DONALD   • JOINT REPLACEMENT      Left Knee 2011 and Right Hip 2010   • OOPHORECTOMY     • ORIF WRIST FRACTURE Left 07/04/2020    Procedure: Open reduction and internal fixation left radius and ulnar shaft fracture;  Surgeon: Grant Donis MD;  [facility];  Service: Orthopedics   • NE ARTHROPLASTY GLENOHUMERAL JOINT TOTAL SHOULDER Left 03/30/2021    Procedure: ARTHROPLASTY SHOULDER - anatomic;  Surgeon: Feliz Garcia MD;  Location[facility];  Service: Orthopedics   • NE [place] SHOULDER ARTHRPLSTY HUMERAL&GLENOID COMPNT Left 12/21/2021    Procedure: REVISION OF TOTAL SHOULDER ARTHROPLASTY TO REVERSE TOTAL SHOULDER ARTHROPLASTY;  Surgeon: Satnam Tierney MD;  Location[facility];  Service: Orthopedics          Social History     Tobacco Use   • Smoking status: Former     Packs/day: 0.20     Years: 20.00     Total pack years: 4.00     Types: Cigarettes     Start date: 0     Quit date: 2008     Years since quitting: 15.7   • Smokeless tobacco: Never   • Tobacco comments:     quit   Vaping Use   • Vaping Use: Never used   Substance Use Topics   • Alcohol use: Not Currently     Alcohol/week: 2.0 standard drinks of alcohol     Types: 1 Glasses of wine, 1 Standard drinks or equivalent per week     Comment: occasionally   • Drug use: Never     Comment: na     Family History   Problem Relation Age of Onset   • Breast cancer Mother 40   • Migraines Mother    • Arthritis Mother    • Kidney disease Father    • Heart disease Father    • Diabetes Father    • Arthritis Father    • Brain cancer Brother    • Seizures Brother Allergies: Allergies   Allergen Reactions   • Ketorolac Itching and Other (See Comments)     [toradol] Itching, hives   • Mushroom Extract Complex - Food Allergy Hives        Current Medications:     Current Outpatient Medications   Medication Instructions   • amLODIPine (NORVASC) 5 mg, Oral, Daily   • apixaban (ELIQUIS) 5 mg, Oral, 2 times daily   • ARIPiprazole (ABILIFY) 5 mg tablet TAKE 1 TABLET EVERY MORNING (CORRECTION BY DR OF PREV SCRIPT)   • atorvastatin (LIPITOR) 40 mg tablet TAKE 1 TABLET BY MOUTH EVERY DAY AT NIGHT   • B-D INSULIN SYRINGE 1CC/25GX1" 25G X 1" 1 ML MISC USE WITH DHE   • celecoxib (CELEBREX) 200 mg, Oral, Daily   • cholecalciferol (VITAMIN D3) 1,000 units tablet No dose, route, or frequency recorded. • dihydroergotamine (DHE) 1 mg/mL INJECT 1 ML (1 MG TOTAL) INJECT INTO THE MUSCLE AS NEEDED FOR MIGRAINE. • furosemide (LASIX) 20 mg, Oral, As needed, Pt reports calls her [facility] cardiologist Dr Simran Jefferson before taking   • gabapentin (NEURONTIN) 600 mg, Oral, 3 times daily   • hydrOXYzine HCL (ATARAX) 50 mg, Oral, Daily at bedtime PRN   • lamoTRIgine (LAMICTAL) 200 mg, Oral, Daily   • meclizine (ANTIVERT) 12.5 mg, Oral, Every 8 hours PRN   • methocarbamol (ROBAXIN) 750 mg tablet TAKE 1 TABLET BY MOUTH 3 TIMES A DAY AS NEEDED FOR MUSCLE SPASMS. • ondansetron (ZOFRAN-ODT) 4 mg disintegrating tablet TAKE 1 TABLET BY MOUTH EVERY 8 HOURS AS NEEDED FOR NAUSEA AND VOMITING   • potassium chloride (K-DUR,KLOR-CON) 20 mEq tablet 20 mEq, Oral, Daily   • Trudhesa 0.725 MG/ACT AERS No dose, route, or frequency recorded. • Ubrelvy 100 MG tablet TAKE 1 TABLET TWICE A DAY AS NEEDED FOR HEADACHE, LIMIT 16 TABS PER MONTH             PRE-OP WORKSHEET DATA    Assessment of Pre-Operative Risks     MLJ Quality Hard Stops:  BMI (<40) : Estimated body mass index is 37.71 kg/m² as calculated from the following:    Height as of this encounter: 5' 8" (1.727 m). Weight as of this encounter: 112 kg (248 lb).     Hgb ( >11): Lab Results   Component Value Date    HGB 13.4 09/22/2023     HbA1c (<7.0) :   Lab Results   Component Value Date    HGBA1C 6.1 (H) 09/22/2023     GFR (>60) (Less then 45 = Nephrology consult):  CrCl cannot be calculated (Patient's most recent lab result is older than the maximum 7 days allowed. ). Active Decompensated Chronic Conditions which would delay surgery  No acutely decompensated medical issues such as recent CVA, MI, new onset arrhythmia, severe aortic stenosis, CHF, uncontrolled COPD       Exercise Capacity: (if less the 4 mets consider functional assessment via cardiac stress testing or consultation)    · Able to walk 2 blocks without symptoms?: Yes  · Able to walk 1 flights without symptoms?: Yes    Assessment of intra and post operative respiratory, hemodynamic and thrombotic risks     Prior Anesthesia Reactions: No     Personal history of venous thromboembolic disease? Yes    History of steroid use > 5 mg for >2 weeks within last year? No    Cardiac Risk Estimation: per the Revised Cardiac Risk Index (Circ. 100:1043, 1999),     The patient's risk factors for cardiac complications include :  none    Juliocesar Castillo has a in hospital cardiac risk of RCI RISK CLASS I (0 risk factors, risk of major cardiac compl. appr. 0.5%) based on RCRI calculator          Pre-Op Data Reviewed:       Laboratory Results: I have personally reviewed the pertinent laboratory results/reports     EKG:I have personally reviewed pertinent reports.   . I personally reviewed and interpreted available tracings in the electronic medical record    Normal sinus rhythm  Voltage criteria for left ventricular hypertrophy  Nonspecific ST abnormality  Abnormal ECG  When compared with ECG of 18-SEP-2023 11:51,  Inverted T waves have replaced nonspecific T wave abnormality in Inferior leads  Confirmed by Anna Blanc ([id number]) on 9/18/2023     OLD RECORDS: reviewed old records in the chart review section if EHR on day of visit.    Previous cardiopulmonary studies within the past year:  · Echocardiogram: no  · Cardiac Catheterization: no  · Stress Test: no      Time of visit including pre-visit chart review, visit and post-visit coordination of plan and care , review of pre-surgical lab work, preparation and time spent documenting note in electronic medical record, time spent face-to-face in physical examination answering patient questions by care team 55 minutes             [facility]

## 2023-09-27 NOTE — PATIENT INSTRUCTIONS
Contact surgical nurse  navigator with any questions regarding preoperative plan or schedule.   Stop all over the counter supplements, herbal, naturopathic  medications for 1 week prior to surgery UNLESS prescribed by your surgeon  Hold NSAIDS (i.e. advil, alleve, motrin, ibuprofen, celebrex) minimum 7 days prior to surgery  Hold Asprin minimum 7 days prior to surgery  Recommend using Tylenol ( acetaminophen ) 500mg every eight hours during the first week post discharge in conjunction with any additional pain medicine prescribed by your surgeon  Use bowel medicines prescribed by your surgeon ( colace) daily post op during the first 1-2 weeks to avoid post operative constipation issues  Call [phone] with any post discharge concerns or medical issues  The morning of surgery take only the following medication with small sip of water: amlodipine  Hold eliquis 2 days prior to surgery; last dose 10/21/23

## 2023-10-03 ENCOUNTER — OFFICE VISIT (OUTPATIENT)
Age: 63
End: 2023-10-03
Payer: COMMERCIAL

## 2023-10-03 VITALS
WEIGHT: 248 LBS | DIASTOLIC BLOOD PRESSURE: 84 MMHG | BODY MASS INDEX: 37.59 KG/M2 | SYSTOLIC BLOOD PRESSURE: 140 MMHG | HEIGHT: 68 IN

## 2023-10-03 DIAGNOSIS — M19.011 PRIMARY OSTEOARTHRITIS OF RIGHT SHOULDER: ICD-10-CM

## 2023-10-03 DIAGNOSIS — E66.9 OBESITY (BMI 30-39.9): ICD-10-CM

## 2023-10-03 DIAGNOSIS — Z01.818 PREOPERATIVE CLEARANCE: Primary | ICD-10-CM

## 2023-10-03 DIAGNOSIS — Z96.612 STATUS POST TOTAL REPLACEMENT OF LEFT SHOULDER: ICD-10-CM

## 2023-10-03 DIAGNOSIS — Z86.718 HISTORY OF DVT (DEEP VEIN THROMBOSIS): ICD-10-CM

## 2023-10-03 DIAGNOSIS — I82.4Z1 LOWER LEG DVT (DEEP VENOUS THROMBOEMBOLISM), ACUTE, RIGHT (HCC): ICD-10-CM

## 2023-10-03 DIAGNOSIS — I82.4Y9 DEEP VEIN THROMBOSIS (DVT) OF PROXIMAL LOWER EXTREMITY, UNSPECIFIED CHRONICITY, UNSPECIFIED LATERALITY (HCC): ICD-10-CM

## 2023-10-03 DIAGNOSIS — Z87.11 HISTORY OF PEPTIC ULCER DISEASE: ICD-10-CM

## 2023-10-03 DIAGNOSIS — Z86.711 HISTORY OF PULMONARY EMBOLISM: ICD-10-CM

## 2023-10-03 PROCEDURE — 99215 OFFICE O/P EST HI 40 MIN: CPT | Performed by: INTERNAL MEDICINE

## 2023-10-04 DIAGNOSIS — K95.89 IRON DEFICIENCY ANEMIA FOLLOWING BARIATRIC SURGERY: ICD-10-CM

## 2023-10-04 DIAGNOSIS — D50.8 IRON DEFICIENCY ANEMIA FOLLOWING BARIATRIC SURGERY: ICD-10-CM

## 2023-10-04 DIAGNOSIS — E53.8 B12 DEFICIENCY: Primary | ICD-10-CM

## 2023-10-04 RX ORDER — CYANOCOBALAMIN 1000 UG/ML
1000 INJECTION, SOLUTION INTRAMUSCULAR; SUBCUTANEOUS ONCE
Status: CANCELLED | OUTPATIENT
Start: 2023-10-04

## 2023-10-06 ENCOUNTER — HOSPITAL ENCOUNTER (OUTPATIENT)
Dept: INFUSION CENTER | Facility: CLINIC | Age: 63
End: 2023-10-06
Payer: COMMERCIAL

## 2023-10-06 DIAGNOSIS — E53.8 B12 DEFICIENCY: Primary | ICD-10-CM

## 2023-10-06 DIAGNOSIS — K95.89 IRON DEFICIENCY ANEMIA FOLLOWING BARIATRIC SURGERY: ICD-10-CM

## 2023-10-06 DIAGNOSIS — D50.8 IRON DEFICIENCY ANEMIA FOLLOWING BARIATRIC SURGERY: ICD-10-CM

## 2023-10-06 PROCEDURE — 96372 THER/PROPH/DIAG INJ SC/IM: CPT

## 2023-10-06 RX ORDER — CYANOCOBALAMIN 1000 UG/ML
1000 INJECTION, SOLUTION INTRAMUSCULAR; SUBCUTANEOUS ONCE
OUTPATIENT
Start: 2023-11-03

## 2023-10-06 RX ORDER — CYANOCOBALAMIN 1000 UG/ML
1000 INJECTION, SOLUTION INTRAMUSCULAR; SUBCUTANEOUS ONCE
Status: COMPLETED | OUTPATIENT
Start: 2023-10-06 | End: 2023-10-06

## 2023-10-06 RX ADMIN — CYANOCOBALAMIN 1000 MCG: 1000 INJECTION, SOLUTION INTRAMUSCULAR at 12:13

## 2023-10-06 NOTE — PROGRESS NOTES
Pt tolerated vitamin B12 injection to the left deltoid. Pt declined AVS but is aware of her next appt.

## 2023-10-09 ENCOUNTER — EVALUATION (OUTPATIENT)
Dept: PHYSICAL THERAPY | Facility: CLINIC | Age: 63
End: 2023-10-09
Payer: COMMERCIAL

## 2023-10-09 DIAGNOSIS — M19.011 PRIMARY OSTEOARTHRITIS OF RIGHT SHOULDER: Primary | ICD-10-CM

## 2023-10-09 DIAGNOSIS — R26.2 AMBULATORY DYSFUNCTION: ICD-10-CM

## 2023-10-09 DIAGNOSIS — R29.898 WEAKNESS OF BOTH LOWER EXTREMITIES: ICD-10-CM

## 2023-10-09 PROCEDURE — 97162 PT EVAL MOD COMPLEX 30 MIN: CPT

## 2023-10-09 PROCEDURE — 97110 THERAPEUTIC EXERCISES: CPT

## 2023-10-09 NOTE — PROGRESS NOTES
PT Evaluation     Today's date: 10/9/2023  Patient name: Juan Bonds  : 1960  MRN: [id number]  Referring provider: Ruben Onofre MD  Dx:   Encounter Diagnosis     ICD-10-CM    1. Primary osteoarthritis of right shoulder  M19.011       2. Weakness of both lower extremities  R29.898 Ambulatory Referral to Physical Therapy      3. Ambulatory dysfunction  R26.2 Ambulatory Referral to Physical Therapy          Start Time: 1100  Stop Time: 1155  Total time in clinic (min): 55 minutes    Assessment  Assessment details: Pt is a 57y.o. year old female presenting to physical therapy for Primary osteoarthritis of right shoulder  (primary encounter diagnosis)  She presents with the following impairments: decreased b/l shoulder ROM with increased deficits noted in the LUE, decreased b/l shoulder strength with increased deficits noted in LUE, decreased C/S ROM, increased tightness of R upper trap, and TTP of R AC joint affecting her function with lifting, overhead reaching, ADLs, showering, dressing, cleaning, and functional ability. Pt will benefit from skilled physical therapy to address functional limitations noted in evaluation and meet patient goals. Impairments: abnormal muscle firing, abnormal or restricted ROM, activity intolerance, impaired physical strength, lacks appropriate home exercise program, pain with function and poor body mechanics    Symptom irritability: moderateBarriers to therapy: N/A  Understanding of Dx/Px/POC: good   Prognosis: good    Goals  ST. Pt will be independent with HEP. 2. Pt will progress R shoulder PROM as per MD protocol in 4 weeks to progress to active and active assisted ROM activities. 3. Pt will show good adherence to post operative restrictions as per MD to facilitate good routine healing. LT. Pt will have 0/10 pain with active shoulder motion by time of discharge.   2. Pt will improve R shoulder strength to WNL to return to PLOF by time of discharge. 3. Pt will improve R shoulder ROM to WNL to return to PLOF by time of discharge. Plan  Patient would benefit from: PT eval and skilled physical therapy  Planned modality interventions: biofeedback, manual electrical stimulation, microcurrent electrical stimulation, TENS, electrical stimulation/Russian stimulation, thermotherapy: hydrocollator packs, cryotherapy and unattended electrical stimulation  Planned therapy interventions: abdominal trunk stabilization, joint mobilization, manual therapy, massage, ADL retraining, neuromuscular re-education, body mechanics training, patient education, postural training, strengthening, stretching, therapeutic activities, therapeutic exercise, flexibility, functional ROM exercises and home exercise program  Frequency: 2x week  Duration in weeks: 12  Treatment plan discussed with: patient        Subjective Evaluation    History of Present Illness  Mechanism of injury: Pt presents to the clinic for Pre-operative visit for her R shoulder which will be undergoing a reverse TSA on 10/24. Pt has history of R shoulder pain that started about 2 years ago when she was having her L shoulder replacement, which then needed a subsequent revision and has had functional limitations with the LUE since the revision. Pt stated that her R shoulder has pain with overhead function and lifting heavier weights. Patient Goals  Patient goals for therapy: decreased pain, increased motion, increased strength, independence with ADLs/IADLs and return to sport/leisure activities    Pain  Current pain ratin  At best pain ratin  At worst pain rating: 10  Quality: sharp and discomfort          Objective     Tenderness     Left Shoulder   No tenderness in the [facility] joint. Right Shoulder  Tenderness in the [facility] joint.      Active Range of Motion   Cervical/Thoracic Spine       Cervical    Flexion:  with pain Restriction level: minimal  Extension:  with pain Restriction level: moderate  Left lateral flexion:  Restriction level: minimal  Right lateral flexion:  WFL  Left rotation:  with pain Restriction level: minimal  Right rotation:  WFL  Left Shoulder   Flexion: 120 degrees   Abduction: 90 degrees   External rotation 45°: 70 degrees   Internal rotation 45°: 80 degrees     Right Shoulder   Flexion: 170 degrees   Abduction: 135 degrees   External rotation 45°: 65 degrees   Internal rotation 45°: 75 degrees     Passive Range of Motion   Left Shoulder   Flexion: 160 degrees   Abduction: 130 degrees     Strength/Myotome Testing     Left Shoulder     Planes of Motion   Flexion: 3+   Abduction: 4-   External rotation at 45°: 3+   Internal rotation at 45°: 4     Right Shoulder     Planes of Motion   Flexion: 4   Abduction: 4+   External rotation at 45°: 4   Internal rotation at 45°: 4              Precautions: N/A    Date 10/9            Visit # IE            FOTO IE             Re-eval IE              Manuals 10/9            R shoulder PROM                                                    Neuro Re-Ed 10/9            scap retracts             pendulums                                                                              Ther Ex 10/9            Elbow flex AROM             Wrist flex/ext AROM             UT stretch             Deltoid isometrics             Table slides                                                    Ther Activity 10/9                                      Gait Training 10/9                                      Modalities 10/9            Ice  prn

## 2023-10-18 ENCOUNTER — TELEPHONE (OUTPATIENT)
Age: 63
End: 2023-10-18

## 2023-10-18 NOTE — TELEPHONE ENCOUNTER
Caller: Patient     Doctor: Ajay Paulino     Reason for call: Patient has questions about taking a medication before surgery     Call back#: [phone]

## 2023-10-19 NOTE — TELEPHONE ENCOUNTER
Caller: Patient    Doctor: Nieves Lima    Reason for call: returning call to [facility]. Patient spoke with surgery coordinator yesterday and was provided the information.     Call back# n/a

## 2023-10-23 NOTE — ANESTHESIA PREPROCEDURE EVALUATION
Procedure:  ANATOMIC VS REVERSE TOTAL SHOULDER ARTHROPLASTY (Right: Shoulder)    Relevant Problems   CARDIO   (+) HLD (hyperlipidemia)   (+) Hypertension   (+) Migraine aura, persistent      ENDO   (+) Subclinical hyperthyroidism      HEMATOLOGY   (+) Anemia   (+) Iron deficiency anemia following bariatric surgery      MUSCULOSKELETAL   (+) Chronic back pain   (+) Fibromyalgia   (+) Low back pain   (+) Lumbar spondylosis   (+) Osteoarthritis of lumbosacral spine without myelopathy   (+) Primary osteoarthritis of left shoulder   (+) Primary osteoarthritis of right shoulder      NEURO/PSYCH   (+) Anxiety   (+) Bilateral occipital neuralgia   (+) Chronic back pain   (+) Fibromyalgia   (+) Migraine aura, persistent   (+) Mild episode of recurrent major depressive disorder (HCC)      H/o stroke    Physical Exam    Airway    Mallampati score: II  TM Distance: >3 FB  Neck ROM: full     Dental       Cardiovascular  Cardiovascular exam normal    Pulmonary  Pulmonary exam normal     Other Findings        Anesthesia Plan  ASA Score- 3     Anesthesia Type- general with ASA Monitors. Additional Monitors:     Airway Plan: ETT. Comment: + PNB. Plan Factors-    Chart reviewed. EKG reviewed. Existing labs reviewed. Patient summary reviewed. Induction- intravenous. Postoperative Plan-     Informed Consent- Anesthetic plan and risks discussed with patient. I personally reviewed this patient with the CRNA. Discussed and agreed on the Anesthesia Plan with the CRNA. Jeremy Keys

## 2023-10-24 ENCOUNTER — APPOINTMENT (OUTPATIENT)
Dept: RADIOLOGY | Facility: HOSPITAL | Age: 63
End: 2023-10-24
Payer: COMMERCIAL

## 2023-10-24 ENCOUNTER — HOSPITAL ENCOUNTER (OUTPATIENT)
Facility: HOSPITAL | Age: 63
Setting detail: OUTPATIENT SURGERY
Discharge: HOME | End: 2023-10-25
Attending: ORTHOPAEDIC SURGERY | Admitting: ORTHOPAEDIC SURGERY
Payer: COMMERCIAL

## 2023-10-24 DIAGNOSIS — Z96.612 S/P REVERSE TOTAL SHOULDER ARTHROPLASTY, LEFT: ICD-10-CM

## 2023-10-24 DIAGNOSIS — M19.011 PRIMARY OSTEOARTHRITIS OF RIGHT SHOULDER: ICD-10-CM

## 2023-10-24 DIAGNOSIS — Z96.611 S/P REVERSE TOTAL SHOULDER ARTHROPLASTY, RIGHT: Primary | ICD-10-CM

## 2023-10-24 DIAGNOSIS — N28.9 RENAL INSUFFICIENCY: ICD-10-CM

## 2023-10-24 LAB
ABO GROUP BLD: NORMAL
BLD GP AB SCN SERPL QL: NEGATIVE
GLUCOSE SERPL-MCNC: 108 MG/DL (ref 65–140)
INR PPP: 0.95 (ref 0.84–1.19)
PROTHROMBIN TIME: 12.5 SECONDS (ref 11.6–14.5)
RH BLD: POSITIVE
SPECIMEN EXPIRATION DATE: NORMAL

## 2023-10-24 PROCEDURE — C1776 JOINT DEVICE (IMPLANTABLE): HCPCS | Performed by: ORTHOPAEDIC SURGERY

## 2023-10-24 PROCEDURE — C1713 ANCHOR/SCREW BN/BN,TIS/BN: HCPCS | Performed by: ORTHOPAEDIC SURGERY

## 2023-10-24 PROCEDURE — C9290 INJ, BUPIVACAINE LIPOSOME: HCPCS | Performed by: ANESTHESIOLOGY

## 2023-10-24 PROCEDURE — 73020 X-RAY EXAM OF SHOULDER: CPT

## 2023-10-24 PROCEDURE — 99222 1ST HOSP IP/OBS MODERATE 55: CPT | Performed by: INTERNAL MEDICINE

## 2023-10-24 PROCEDURE — 82948 REAGENT STRIP/BLOOD GLUCOSE: CPT

## 2023-10-24 PROCEDURE — 86850 RBC ANTIBODY SCREEN: CPT | Performed by: ORTHOPAEDIC SURGERY

## 2023-10-24 PROCEDURE — 23472 RECONSTRUCT SHOULDER JOINT: CPT | Performed by: ORTHOPAEDIC SURGERY

## 2023-10-24 PROCEDURE — 85610 PROTHROMBIN TIME: CPT | Performed by: ANESTHESIOLOGY

## 2023-10-24 PROCEDURE — NC001 PR NO CHARGE: Performed by: ORTHOPAEDIC SURGERY

## 2023-10-24 PROCEDURE — 86901 BLOOD TYPING SEROLOGIC RH(D): CPT | Performed by: ORTHOPAEDIC SURGERY

## 2023-10-24 PROCEDURE — 99245 OFF/OP CONSLTJ NEW/EST HI 55: CPT | Performed by: INTERNAL MEDICINE

## 2023-10-24 PROCEDURE — 23472 RECONSTRUCT SHOULDER JOINT: CPT | Performed by: PHYSICIAN ASSISTANT

## 2023-10-24 PROCEDURE — 86900 BLOOD TYPING SEROLOGIC ABO: CPT | Performed by: ORTHOPAEDIC SURGERY

## 2023-10-24 DEVICE — SCREW CENTRAL 6 X 35MM: Type: IMPLANTABLE DEVICE | Site: SHOULDER | Status: FUNCTIONAL

## 2023-10-24 DEVICE — SCREW PERIPHERAL 5 X 26MM: Type: IMPLANTABLE DEVICE | Site: SHOULDER | Status: FUNCTIONAL

## 2023-10-24 DEVICE — IMPLANTABLE DEVICE
Type: IMPLANTABLE DEVICE | Site: SHOULDER | Status: FUNCTIONAL
Brand: TORNIER PERFORM® REVERSED GLENOID

## 2023-10-24 DEVICE — IMPLANTABLE DEVICE
Type: IMPLANTABLE DEVICE | Site: SHOULDER | Status: FUNCTIONAL
Brand: FLEX SHOULDER SYSTEM

## 2023-10-24 DEVICE — IMPLANTABLE DEVICE
Type: IMPLANTABLE DEVICE | Site: SHOULDER | Status: FUNCTIONAL
Brand: TORNIER FLEX SHOULDER SYSTEM

## 2023-10-24 DEVICE — SCREW PERIPHERAL 5 X 18MM: Type: IMPLANTABLE DEVICE | Site: SHOULDER | Status: FUNCTIONAL

## 2023-10-24 DEVICE — IMPLANTABLE DEVICE
Type: IMPLANTABLE DEVICE | Site: SHOULDER | Status: FUNCTIONAL
Brand: AEQUALIS™ ASCEND™ FLEX

## 2023-10-24 DEVICE — AUGMENT BASEPLATE 15DEG 25MM FULL WEDGE: Type: IMPLANTABLE DEVICE | Site: SHOULDER | Status: FUNCTIONAL

## 2023-10-24 DEVICE — SCREW PERIPHERAL 5 X 22MM: Type: IMPLANTABLE DEVICE | Site: SHOULDER | Status: FUNCTIONAL

## 2023-10-24 RX ORDER — ONDANSETRON 2 MG/ML
4 INJECTION INTRAMUSCULAR; INTRAVENOUS EVERY 6 HOURS PRN
Status: DISCONTINUED | OUTPATIENT
Start: 2023-10-24 | End: 2023-10-25 | Stop reason: HOSPADM

## 2023-10-24 RX ORDER — CALCIUM CARBONATE 500 MG/1
1000 TABLET, CHEWABLE ORAL DAILY PRN
Status: DISCONTINUED | OUTPATIENT
Start: 2023-10-24 | End: 2023-10-25 | Stop reason: HOSPADM

## 2023-10-24 RX ORDER — MIDAZOLAM HYDROCHLORIDE 2 MG/2ML
INJECTION, SOLUTION INTRAMUSCULAR; INTRAVENOUS AS NEEDED
Status: DISCONTINUED | OUTPATIENT
Start: 2023-10-24 | End: 2023-10-24

## 2023-10-24 RX ORDER — PROPOFOL 10 MG/ML
INJECTION, EMULSION INTRAVENOUS AS NEEDED
Status: DISCONTINUED | OUTPATIENT
Start: 2023-10-24 | End: 2023-10-24

## 2023-10-24 RX ORDER — MAGNESIUM HYDROXIDE 1200 MG/15ML
LIQUID ORAL AS NEEDED
Status: DISCONTINUED | OUTPATIENT
Start: 2023-10-24 | End: 2023-10-24 | Stop reason: HOSPADM

## 2023-10-24 RX ORDER — OXYCODONE HYDROCHLORIDE 10 MG/1
10 TABLET ORAL EVERY 4 HOURS PRN
Status: DISCONTINUED | OUTPATIENT
Start: 2023-10-24 | End: 2023-10-25 | Stop reason: HOSPADM

## 2023-10-24 RX ORDER — CEFAZOLIN SODIUM 2 G/50ML
2000 SOLUTION INTRAVENOUS ONCE
Status: COMPLETED | OUTPATIENT
Start: 2023-10-24 | End: 2023-10-24

## 2023-10-24 RX ORDER — SODIUM CHLORIDE, SODIUM LACTATE, POTASSIUM CHLORIDE, CALCIUM CHLORIDE 600; 310; 30; 20 MG/100ML; MG/100ML; MG/100ML; MG/100ML
INJECTION, SOLUTION INTRAVENOUS CONTINUOUS PRN
Status: DISCONTINUED | OUTPATIENT
Start: 2023-10-24 | End: 2023-10-24

## 2023-10-24 RX ORDER — OXYCODONE HYDROCHLORIDE 5 MG/1
TABLET ORAL
Qty: 15 TABLET | Refills: 0 | Status: SHIPPED | OUTPATIENT
Start: 2023-10-24 | End: 2023-10-30 | Stop reason: ALTCHOICE

## 2023-10-24 RX ORDER — MECLIZINE HCL 12.5 MG/1
12.5 TABLET ORAL EVERY 8 HOURS PRN
Status: DISCONTINUED | OUTPATIENT
Start: 2023-10-24 | End: 2023-10-25 | Stop reason: HOSPADM

## 2023-10-24 RX ORDER — LIDOCAINE HYDROCHLORIDE 10 MG/ML
0.5 INJECTION, SOLUTION EPIDURAL; INFILTRATION; INTRACAUDAL; PERINEURAL ONCE AS NEEDED
Status: DISCONTINUED | OUTPATIENT
Start: 2023-10-24 | End: 2023-10-24 | Stop reason: HOSPADM

## 2023-10-24 RX ORDER — ONDANSETRON 2 MG/ML
INJECTION INTRAMUSCULAR; INTRAVENOUS AS NEEDED
Status: DISCONTINUED | OUTPATIENT
Start: 2023-10-24 | End: 2023-10-24

## 2023-10-24 RX ORDER — ROCURONIUM BROMIDE 10 MG/ML
INJECTION, SOLUTION INTRAVENOUS AS NEEDED
Status: DISCONTINUED | OUTPATIENT
Start: 2023-10-24 | End: 2023-10-24

## 2023-10-24 RX ORDER — FENTANYL CITRATE 50 UG/ML
INJECTION, SOLUTION INTRAMUSCULAR; INTRAVENOUS AS NEEDED
Status: DISCONTINUED | OUTPATIENT
Start: 2023-10-24 | End: 2023-10-24

## 2023-10-24 RX ORDER — AMLODIPINE BESYLATE 5 MG/1
5 TABLET ORAL DAILY
Status: DISCONTINUED | OUTPATIENT
Start: 2023-10-25 | End: 2023-10-25 | Stop reason: HOSPADM

## 2023-10-24 RX ORDER — SODIUM CHLORIDE, SODIUM LACTATE, POTASSIUM CHLORIDE, CALCIUM CHLORIDE 600; 310; 30; 20 MG/100ML; MG/100ML; MG/100ML; MG/100ML
125 INJECTION, SOLUTION INTRAVENOUS CONTINUOUS
Status: DISCONTINUED | OUTPATIENT
Start: 2023-10-24 | End: 2023-10-25 | Stop reason: HOSPADM

## 2023-10-24 RX ORDER — POTASSIUM CHLORIDE 20 MEQ/1
20 TABLET, EXTENDED RELEASE ORAL DAILY
Status: DISCONTINUED | OUTPATIENT
Start: 2023-10-24 | End: 2023-10-25 | Stop reason: HOSPADM

## 2023-10-24 RX ORDER — OXYCODONE HYDROCHLORIDE 5 MG/1
5 TABLET ORAL EVERY 4 HOURS PRN
Status: DISCONTINUED | OUTPATIENT
Start: 2023-10-24 | End: 2023-10-25 | Stop reason: HOSPADM

## 2023-10-24 RX ORDER — MELATONIN
1000 DAILY
Status: DISCONTINUED | OUTPATIENT
Start: 2023-10-24 | End: 2023-10-25 | Stop reason: HOSPADM

## 2023-10-24 RX ORDER — HYDROXYZINE 50 MG/1
50 TABLET, FILM COATED ORAL
Status: DISCONTINUED | OUTPATIENT
Start: 2023-10-24 | End: 2023-10-25 | Stop reason: HOSPADM

## 2023-10-24 RX ORDER — BUPIVACAINE HYDROCHLORIDE 5 MG/ML
INJECTION, SOLUTION EPIDURAL; INTRACAUDAL AS NEEDED
Status: DISCONTINUED | OUTPATIENT
Start: 2023-10-24 | End: 2023-10-24

## 2023-10-24 RX ORDER — FENTANYL CITRATE/PF 50 MCG/ML
25 SYRINGE (ML) INJECTION
Status: DISCONTINUED | OUTPATIENT
Start: 2023-10-24 | End: 2023-10-24 | Stop reason: HOSPADM

## 2023-10-24 RX ORDER — LAMOTRIGINE 100 MG/1
200 TABLET ORAL DAILY
Status: DISCONTINUED | OUTPATIENT
Start: 2023-10-25 | End: 2023-10-25 | Stop reason: HOSPADM

## 2023-10-24 RX ORDER — DOCUSATE SODIUM 100 MG/1
100 CAPSULE, LIQUID FILLED ORAL 2 TIMES DAILY
Status: DISCONTINUED | OUTPATIENT
Start: 2023-10-24 | End: 2023-10-25 | Stop reason: HOSPADM

## 2023-10-24 RX ORDER — GABAPENTIN 300 MG/1
600 CAPSULE ORAL 3 TIMES DAILY
Status: DISCONTINUED | OUTPATIENT
Start: 2023-10-24 | End: 2023-10-25 | Stop reason: HOSPADM

## 2023-10-24 RX ORDER — SODIUM CHLORIDE, SODIUM LACTATE, POTASSIUM CHLORIDE, CALCIUM CHLORIDE 600; 310; 30; 20 MG/100ML; MG/100ML; MG/100ML; MG/100ML
100 INJECTION, SOLUTION INTRAVENOUS CONTINUOUS
Status: DISCONTINUED | OUTPATIENT
Start: 2023-10-24 | End: 2023-10-25 | Stop reason: HOSPADM

## 2023-10-24 RX ORDER — ACETAMINOPHEN 325 MG/1
650 TABLET ORAL EVERY 6 HOURS PRN
Status: DISCONTINUED | OUTPATIENT
Start: 2023-10-24 | End: 2023-10-25 | Stop reason: HOSPADM

## 2023-10-24 RX ORDER — ONDANSETRON 2 MG/ML
4 INJECTION INTRAMUSCULAR; INTRAVENOUS ONCE AS NEEDED
Status: COMPLETED | OUTPATIENT
Start: 2023-10-24 | End: 2023-10-24

## 2023-10-24 RX ORDER — CEFAZOLIN SODIUM 2 G/50ML
2000 SOLUTION INTRAVENOUS EVERY 8 HOURS
Status: COMPLETED | OUTPATIENT
Start: 2023-10-24 | End: 2023-10-25

## 2023-10-24 RX ORDER — CHLORHEXIDINE GLUCONATE ORAL RINSE 1.2 MG/ML
15 SOLUTION DENTAL ONCE
Status: COMPLETED | OUTPATIENT
Start: 2023-10-24 | End: 2023-10-24

## 2023-10-24 RX ORDER — HYDROMORPHONE HCL/PF 1 MG/ML
0.5 SYRINGE (ML) INJECTION
Status: COMPLETED | OUTPATIENT
Start: 2023-10-24 | End: 2023-10-24

## 2023-10-24 RX ORDER — HYDRALAZINE HYDROCHLORIDE 25 MG/1
25 TABLET, FILM COATED ORAL EVERY 8 HOURS PRN
Status: DISCONTINUED | OUTPATIENT
Start: 2023-10-24 | End: 2023-10-25 | Stop reason: HOSPADM

## 2023-10-24 RX ORDER — LIDOCAINE HYDROCHLORIDE 10 MG/ML
INJECTION, SOLUTION EPIDURAL; INFILTRATION; INTRACAUDAL; PERINEURAL AS NEEDED
Status: DISCONTINUED | OUTPATIENT
Start: 2023-10-24 | End: 2023-10-24

## 2023-10-24 RX ORDER — METHOCARBAMOL 750 MG/1
750 TABLET, FILM COATED ORAL EVERY 8 HOURS PRN
Status: DISCONTINUED | OUTPATIENT
Start: 2023-10-24 | End: 2023-10-25 | Stop reason: HOSPADM

## 2023-10-24 RX ORDER — EPHEDRINE SULFATE 50 MG/ML
INJECTION INTRAVENOUS AS NEEDED
Status: DISCONTINUED | OUTPATIENT
Start: 2023-10-24 | End: 2023-10-24

## 2023-10-24 RX ORDER — ARIPIPRAZOLE 5 MG/1
5 TABLET ORAL EVERY MORNING
Status: DISCONTINUED | OUTPATIENT
Start: 2023-10-25 | End: 2023-10-25 | Stop reason: HOSPADM

## 2023-10-24 RX ORDER — AMLODIPINE BESYLATE 5 MG/1
5 TABLET ORAL DAILY
Status: DISCONTINUED | OUTPATIENT
Start: 2023-10-25 | End: 2023-10-24

## 2023-10-24 RX ORDER — DEXAMETHASONE SODIUM PHOSPHATE 10 MG/ML
INJECTION, SOLUTION INTRAMUSCULAR; INTRAVENOUS AS NEEDED
Status: DISCONTINUED | OUTPATIENT
Start: 2023-10-24 | End: 2023-10-24

## 2023-10-24 RX ORDER — ATORVASTATIN CALCIUM 40 MG/1
40 TABLET, FILM COATED ORAL
Status: DISCONTINUED | OUTPATIENT
Start: 2023-10-24 | End: 2023-10-25 | Stop reason: HOSPADM

## 2023-10-24 RX ADMIN — BUPIVACAINE HYDROCHLORIDE 10 ML: 5 INJECTION, SOLUTION EPIDURAL; INTRACAUDAL; PERINEURAL at 09:16

## 2023-10-24 RX ADMIN — CEFAZOLIN SODIUM 2000 MG: 2 SOLUTION INTRAVENOUS at 09:36

## 2023-10-24 RX ADMIN — SUGAMMADEX 300 MG: 100 INJECTION, SOLUTION INTRAVENOUS at 10:42

## 2023-10-24 RX ADMIN — EPHEDRINE SULFATE 10 MG: 50 INJECTION INTRAVENOUS at 10:18

## 2023-10-24 RX ADMIN — GABAPENTIN 600 MG: 300 CAPSULE ORAL at 20:40

## 2023-10-24 RX ADMIN — FENTANYL CITRATE 25 MCG: 50 INJECTION INTRAMUSCULAR; INTRAVENOUS at 10:40

## 2023-10-24 RX ADMIN — EPHEDRINE SULFATE 5 MG: 50 INJECTION INTRAVENOUS at 10:31

## 2023-10-24 RX ADMIN — EPHEDRINE SULFATE 5 MG: 50 INJECTION INTRAVENOUS at 10:26

## 2023-10-24 RX ADMIN — BUPIVACAINE 20 ML: 13.3 INJECTION, SUSPENSION, LIPOSOMAL INFILTRATION at 09:16

## 2023-10-24 RX ADMIN — FENTANYL CITRATE 50 MCG: 50 INJECTION INTRAMUSCULAR; INTRAVENOUS at 09:32

## 2023-10-24 RX ADMIN — PROPOFOL 170 MG: 10 INJECTION, EMULSION INTRAVENOUS at 09:32

## 2023-10-24 RX ADMIN — CHLORHEXIDINE GLUCONATE 15 ML: 1.2 SOLUTION ORAL at 07:48

## 2023-10-24 RX ADMIN — CEFAZOLIN SODIUM 2000 MG: 2 SOLUTION INTRAVENOUS at 17:09

## 2023-10-24 RX ADMIN — FENTANYL CITRATE 25 MCG: 50 INJECTION INTRAMUSCULAR; INTRAVENOUS at 11:14

## 2023-10-24 RX ADMIN — GABAPENTIN 600 MG: 300 CAPSULE ORAL at 17:09

## 2023-10-24 RX ADMIN — DEXAMETHASONE SODIUM PHOSPHATE 10 MG: 10 INJECTION, SOLUTION INTRAMUSCULAR; INTRAVENOUS at 09:36

## 2023-10-24 RX ADMIN — EPHEDRINE SULFATE 5 MG: 50 INJECTION INTRAVENOUS at 10:11

## 2023-10-24 RX ADMIN — FENTANYL CITRATE 25 MCG: 50 INJECTION INTRAMUSCULAR; INTRAVENOUS at 09:54

## 2023-10-24 RX ADMIN — SODIUM CHLORIDE, SODIUM LACTATE, POTASSIUM CHLORIDE, AND CALCIUM CHLORIDE 100 ML/HR: .6; .31; .03; .02 INJECTION, SOLUTION INTRAVENOUS at 11:36

## 2023-10-24 RX ADMIN — PROPOFOL 30 MG: 10 INJECTION, EMULSION INTRAVENOUS at 09:54

## 2023-10-24 RX ADMIN — Medication 1000 UNITS: at 17:09

## 2023-10-24 RX ADMIN — ROCURONIUM BROMIDE 50 MG: 10 INJECTION, SOLUTION INTRAVENOUS at 09:33

## 2023-10-24 RX ADMIN — ONDANSETRON 4 MG: 2 INJECTION INTRAMUSCULAR; INTRAVENOUS at 10:35

## 2023-10-24 RX ADMIN — HYDROXYZINE HYDROCHLORIDE 50 MG: 50 TABLET, FILM COATED ORAL at 18:51

## 2023-10-24 RX ADMIN — FENTANYL CITRATE 25 MCG: 50 INJECTION INTRAMUSCULAR; INTRAVENOUS at 11:04

## 2023-10-24 RX ADMIN — HYDROMORPHONE HYDROCHLORIDE 0.5 MG: 1 INJECTION, SOLUTION INTRAMUSCULAR; INTRAVENOUS; SUBCUTANEOUS at 11:45

## 2023-10-24 RX ADMIN — HYDROMORPHONE HYDROCHLORIDE 0.5 MG: 1 INJECTION, SOLUTION INTRAMUSCULAR; INTRAVENOUS; SUBCUTANEOUS at 11:29

## 2023-10-24 RX ADMIN — PHENYLEPHRINE HYDROCHLORIDE 60 MCG/MIN: 10 INJECTION INTRAVENOUS at 09:39

## 2023-10-24 RX ADMIN — DOCUSATE SODIUM 100 MG: 100 CAPSULE, LIQUID FILLED ORAL at 17:09

## 2023-10-24 RX ADMIN — ATORVASTATIN CALCIUM 40 MG: 40 TABLET, FILM COATED ORAL at 17:09

## 2023-10-24 RX ADMIN — SODIUM CHLORIDE, SODIUM LACTATE, POTASSIUM CHLORIDE, AND CALCIUM CHLORIDE 125 ML/HR: .6; .31; .03; .02 INJECTION, SOLUTION INTRAVENOUS at 08:14

## 2023-10-24 RX ADMIN — OXYCODONE HYDROCHLORIDE 10 MG: 10 TABLET ORAL at 14:54

## 2023-10-24 RX ADMIN — OXYCODONE HYDROCHLORIDE 10 MG: 10 TABLET ORAL at 18:54

## 2023-10-24 RX ADMIN — LIDOCAINE HYDROCHLORIDE 50 MG: 10 INJECTION, SOLUTION EPIDURAL; INFILTRATION; INTRACAUDAL; PERINEURAL at 09:32

## 2023-10-24 RX ADMIN — ONDANSETRON HYDROCHLORIDE 4 MG: 2 INJECTION, SOLUTION INTRAMUSCULAR; INTRAVENOUS at 11:06

## 2023-10-24 RX ADMIN — FENTANYL CITRATE 25 MCG: 50 INJECTION INTRAMUSCULAR; INTRAVENOUS at 12:21

## 2023-10-24 RX ADMIN — SODIUM CHLORIDE, SODIUM LACTATE, POTASSIUM CHLORIDE, AND CALCIUM CHLORIDE: .6; .31; .03; .02 INJECTION, SOLUTION INTRAVENOUS at 10:35

## 2023-10-24 RX ADMIN — SODIUM CHLORIDE, SODIUM LACTATE, POTASSIUM CHLORIDE, AND CALCIUM CHLORIDE: .6; .31; .03; .02 INJECTION, SOLUTION INTRAVENOUS at 09:27

## 2023-10-24 RX ADMIN — MIDAZOLAM 2 MG: 1 INJECTION INTRAMUSCULAR; INTRAVENOUS at 09:11

## 2023-10-24 NOTE — ANESTHESIA POSTPROCEDURE EVALUATION
Post-Op Assessment Note    CV Status:  Stable  Pain Score: 0    Pain management: adequate     Mental Status:  Alert and awake   Hydration Status:  Euvolemic   PONV Controlled:  Controlled   Airway Patency:  Patent      Post Op Vitals Reviewed: Yes      Staff: CRNA         There were no known notable events for this encounter.     BP   145/72   Temp   97.0   Pulse   88   Resp   12   SpO2   96% RA

## 2023-10-24 NOTE — INTERVAL H&P NOTE
H&P reviewed. After examining the patient I find no changes in the patients condition since the H&P had been written.     Vitals:    10/24/23 0747   BP: 146/75   Pulse: 88   Resp: 18   Temp: 99 °F (37.2 °C)   SpO2: 98%

## 2023-10-24 NOTE — OP NOTE
OPERATIVE REPORT  PATIENT NAME: Dari Beth    :  1960  MRN: [id number]  Pt Location:  OR ROOM 15    SURGERY DATE: 10/24/2023     SURGEON: Le Dee MD     ASSISTANT: Lucho Malin PA-C     NOTE: Lucho Malin PA-C was present throughout the entire procedure and performed essential assistance with patient prepping, draping, positioning, trial implantation/range of motion, final implant insertion, careful retraction, wound closure, sterile dressing application and sling application, all under my direct supervision. NOTE: No qualified resident physician was available for assistance    SECOND ASSISTANT: Sadie Cherry ATC/OTC    PREOPERATIVE DIAGNOSIS: Right Shoulder Glenohumeral Osteoarthritis    POSTOPERATIVE DIAGNOSIS: Same    PROCEDURES: Right Shoulder with Reverse Total Shoulder Arthroplasty with Long Head Biceps Tenodesis    ANESTHESIA STAFF: Mickie Schumacher MD     ANESTHESIA TYPE: General with endotracheal tube with ultrasound guided interscalene block (Exparel). The interscalene block was provided by the anesthesia staff per my request for postoperative pain control and to decrease the use of postoperative narcotic medication for pain control. COMPLICATIONS: None    FINDINGS: Glenohumeral Osteoarthritis with Severe Glenoid Retroversion    SPECIMEN(S): none    ESTIMATED BLOOD LOSS: 75 mL    INDICATIONS FOR PROCEDURE:  The patient is a 57 y.o. female presenting with pain and lack of function secondary to glenohumeral osteoarthritis of the right shoulder. Preoperative templating revealed a Anthony B2 version, this precluded the implantation of a anatomic prosthesis and a reverse prosthesis was utilized to correct this retroversion.   After a thorough discussion of the risks and benefits of operative and nonoperative care the patient elected for right total shoulder arthroplasty with prosthetic type defined by intraoperative findings, given the retroversion seen on preoperative imaging reverse prosthesis was thought to likely be required. Informed consent was obtained in the office. OPERATIVE TECHNIQUE:  The day of surgery I identified the patient's right shoulder and marked it with my initials. The patient was taken back to the operating room where anesthesia was induced by the anesthesia staff without complication. The patient was placed in the beachchair position with all bony prominences padded. The right shoulder was prepped and draped in routine sterile fashion and after a time-out for safety and confirming 2 grams of IV Cefazolin were given, a standard deltopectoral approach was performed. The dissection was carried down to the subscapularis which was intact. It was released and tagged for repair to the anterior humerus later in the case. . The long head of biceps had severe tenosynovitis and degeneration, so it was released and tagged for later tenodesis to the anterior humerus at the end of the case. The humeral head was exposed and found to have severe degenerative change with an intact rotator cuff. The humerus was prepared keeping with the surgical technique for a Tornier Flex reverse prosthesis. After preparing the humerus the glenoid was exposed and confirmed to have severe retroversion that precluded the successful implantation of an anatomic glenoid. Given this, the glenoid was prepared for a 25 mm Tornier Perform Plus full wedge augmented baseplate. The full wedge augment was required to correct the 22 degrees retroversion appreciated on preoperative templating and confirmed intraoperatively. The baseplate was placed and then secured with a central 6.5 mm diameter 35 mm length screw. Additional fixation was achieved with a 18 mm non-locking screw followed by a superior 26 mm and an inferior 26 mm locking screws. These achieved excellent fixation of the baseplate to the glenoid. A 39 mm glenosphere was then fixated to the baseplate.  The humerus was finished and a size 5A Tornier Flex Stem with a low offset tray and a +9 mm C angle polyethylene (135 degree construct) was trialed and found to have excellent stability with full range of motion and appropriate soft tissue tension. Final implants were placed and the area was irrigated with pulse lavage. The subscapularis was repaired to the anterior humerus and the long head biceps was tenodesed to the anterior humerus with Orthocord sutures. The deltopectoral interval was loosely closed with 0 Vicryl and the subdermal layer with 2-0 Vicryl with staples for skin. Sterile dressings and a sling with abduction pillow was placed and the patient was awoken. The patient was transported to the recovery room in good condition and will be admitted for post-operative care and physical therapy will be initiated following the standard reverse total shoulder arthroplasty protocol.     PATIENT DISPOSITION:  Stable to PACU      SIGNATURE: Nyle Fothergill, MD  DATE: October 24, 2023  TIME: 10:51 AM

## 2023-10-24 NOTE — CASE MANAGEMENT
Case Management Assessment & Discharge Planning Note    Patient name Dari Beth  Location /-66 MRN [id number]  : 1960 Date 10/24/2023       Current Admission Date: 10/24/2023  Current Admission Diagnosis:Primary osteoarthritis of right shoulder   Patient Active Problem List    Diagnosis Date Noted    Preoperative clearance 2023    Postmenopausal 2023    Benzodiazepine dependence ([address]) 2023    Weakness of both lower extremities 2023    B12 deficiency 10/18/2022    Iron deficiency anemia following bariatric surgery 10/11/2022    Subclinical hyperthyroidism 2022    S/P reverse total shoulder arthroplasty, left 2021    Primary osteoarthritis of right shoulder 10/18/2021    Alkalosis 2021    Status post total replacement of left shoulder 2021    Primary osteoarthritis of left shoulder 2021    Right distal ulnar fracture 2020    History of anxiety 2020    History of pulmonary embolism 2020    Closed fracture of distal ends of left radius and ulna 2020    Fracture of multiple ribs of right side 2020    Obesity (BMI 30-39.9) 10/16/2019    Edema 2019    Ambulatory dysfunction 2019    Anemia 2019    Leukocytosis 04/10/2019    Chronic anticoagulation 04/10/2019    Anxiety 2019    Hypertension     Chronic back pain     Fatigue 2019    Hypersomnia 2019    Lumbar spondylosis 2019    Mild episode of recurrent major depressive disorder ([address]) 2019    Hyperglycemia 2019    Recurrent falls 2018    Intractable vomiting with nausea 2018    Closed fracture of styloid process of radius 2018    PE (pulmonary thromboembolism) ([address]) 2018    Bilateral occipital neuralgia 2018    Right hip pain 2018    Anticoagulated by anticoagulation treatment 2018    Lower leg DVT (deep venous thromboembolism), acute, right ([address]) 2018    Migraine aura, persistent 03/02/2018    Neck pain 01/04/2018    Low back pain 01/04/2018    Orthostatic hypotension     History of TIAs     History of migraine     Bipolar II disorder ([address]) 07/20/2017    Bipolar depression ([address]) 07/10/2017    Peripheral neuropathy 07/10/2017    HLD (hyperlipidemia) 05/28/2017    Vertigo 07/21/2016    Hypokalemia     Fibromyalgia     Myofascial pain 05/31/2016    Osteoarthritis of lumbosacral spine without myelopathy 09/08/2015    Thoracic or lumbosacral neuritis or radiculitis 10/24/2012    History of peptic ulcer disease 09/19/2012      LOS (days): 0  Geometric Mean LOS (GMLOS) (days):   Days to GMLOS:     OBJECTIVE:             Current admission status: Outpatient Surgery       Preferred Pharmacy:   [id number] [place], [address]  Phone: [phone] Fax: [phone] [facility] [address] [place] [address]  Phone: [phone] Fax: [phone]    [id number] [address]  [address]ash Salas [address] [address]  Phone: [phone] Fax: [phone]    Primary Care Provider: Daniel Sexton MD    Primary Insurance: BLUE CROSS  Secondary Insurance:     ASSESSMENT:  [facility], [address] [facility] Representative - Spouse   Primary Phone: [phone] (Mobile)  Home Phone: [phone]                 Advance Directives  Does patient have a [address]?: No  Does patient have Advance Directives?: No  Was patient offered paperwork?: Yes  Primary Contact: Daly Cruz         Readmission Root Cause  30 Day Readmission: No    Patient Information  Admitted from[de-identified] Home  Mental Status: Alert  During Assessment patient was accompanied by: Not accompanied during assessment  Assessment information provided by[de-identified] Patient  Primary Caregiver: Self  Support Systems: Self, Spouse/significant other  [place] of Residence: [address] [place] [address] do you live in?: [place] mooney entry access options.  Select all that apply.: Stairs  Number of steps to enter home.: 3  Do the steps have railings?: Yes  Type of Current Residence: 2 Ledbetter home  Upon entering residence, is there a bedroom on the main floor (no further steps)?: Yes  Upon entering residence, is there a bathroom on the main floor (no further steps)?: Yes  In the last 12 months, was there a time when you were not able to pay the mortgage or rent on time?: No  In the last 12 months, how many places have you lived?: 1  In the last 12 months, was there a time when you did not have a steady place to sleep or slept in a shelter (including now)?: No  Homeless/housing insecurity resource given?: N/A  Living Arrangements: Lives w/ Spouse/significant other  Is patient a ?: No    Activities of Daily Living Prior to Admission  Functional Status: Independent  Completes ADLs independently?: Yes  Ambulates independently?: Yes  Does patient use assisted devices?: No  Does patient currently own DME?: No  Does patient have a history of Outpatient Therapy (PT/OT)?: Yes ([facility])  Does the patient have a history of Short-Term Rehab?: Yes ([address] [place] [address]?)  Does patient have a history of HHC?: Yes  Does patient currently have [address] [facility]?: No         Patient Information Continued  Income Source: SSI/SSD  Does patient have prescription coverage?: Yes  Within the past 12 months, you worried that your food would run out before you got the money to buy more.: Never true  Within the past 12 months, the food you bought just didn't last and you didn't have money to get more.: Never true  Food insecurity resource given?: N/A  Does patient receive dialysis treatments?: No  Does patient have a history of substance abuse?: No  Does patient have a history of Mental Health Diagnosis?: Yes (Anxiety and Depression)  Is patient receiving treatment for mental health?: Yes  Has patient received inpatient treatment related to mental health in the last 2 years?: No         Means of Transportation  Means of Transport to Appts[de-identified] Drives Self  In the past 12 months, has lack of transportation kept you from medical appointments or from getting medications?: No  In the past 12 months, has lack of transportation kept you from meetings, work, or from getting things needed for daily living?: No  Was application for public transport provided?: N/A        DISCHARGE DETAILS:    Discharge planning discussed with[de-identified] patient  Freedom of Choice: Yes  Comments - Freedom of Choice: pending recs. patient does want [facility] for an aide  CM contacted family/caregiver?: No- see comments  Were Treatment Team discharge recommendations reviewed with patient/caregiver?: Yes  Did patient/caregiver verbalize understanding of patient care needs?: Yes  Were patient/caregiver advised of the risks associated with not following Treatment Team discharge recommendations?: Yes    Contacts  Patient Contacts: Wendy Sutton  Relationship to Patient[de-identified] Family  Contact Method: Phone  Phone Number:  [phone]  Reason/Outcome: Continuity of Care, Emergency Contact, Discharge [facility]         Is the patient interested in [address]  [facility] at discharge?: Yes  [facility] requested[de-identified] [facility] Name[de-identified]  (requesting Herb Beavers)  [facility] Provider[de-identified] PCP  Homebound Criteria Met[de-identified] Requires the Assistance of Another Person for Safe Ambulation or to Leave the Home  Supporting Clincal Findings[de-identified] Bed Bound or Wheelchair Bound    Patient independent with ambulation and adl's. Here for outpatient surgery of right shoulder arthroplasty. She is Covid Vaccinated for one series and two boosters. Lives with spouse in a 2 story home but has one level bedroom and bath accommodations on 1st level. Patient requesting an aide with [facility] at discharge. Referral sent. Pending any additional recommendations from therapy. CM to follow as needed. .CM reviewed d/c planning process including the following: identifying help at home, patient preference for d/c planning needs, Discharge Lounge, [facility] Meds to Bed program, availability of treatment team to discuss questions or concerns patient and/or family may have regarding understanding medications and recognizing signs and symptoms once discharged. CM also encouraged patient to follow up with all recommended appointments after discharge. Patient advised of importance for patient and family to participate in managing patient’s medical well being.

## 2023-10-24 NOTE — PLAN OF CARE
Problem: PAIN - ADULT  Goal: Verbalizes/displays adequate comfort level or baseline comfort level  Description: Interventions:  - Encourage patient to monitor pain and request assistance  - Assess pain using appropriate pain scale  - Administer analgesics based on type and severity of pain and evaluate response  - Implement non-pharmacological measures as appropriate and evaluate response  - Consider cultural and social influences on pain and pain management  - Notify physician/advanced practitioner if interventions unsuccessful or patient reports new pain  Outcome: Progressing     Problem: INFECTION - ADULT  Goal: Absence or prevention of progression during hospitalization  Description: INTERVENTIONS:  - Assess and monitor for signs and symptoms of infection  - Monitor lab/diagnostic results  - Monitor all insertion sites, i.e. indwelling lines, tubes, and drains  - Monitor endotracheal if appropriate and nasal secretions for changes in amount and color  - [place] appropriate cooling/warming therapies per order  - Administer medications as ordered  - Instruct and encourage patient and family to use good hand hygiene technique  - Identify and instruct in appropriate isolation precautions for identified infection/condition  Outcome: Progressing     Problem: SAFETY ADULT  Goal: Patient will remain free of falls  Description: INTERVENTIONS:  - Educate patient/family on patient safety including physical limitations  - Instruct patient to call for assistance with activity   - Consult OT/PT to assist with strengthening/mobility   - Keep Call bell within reach  - Keep bed low and locked with side rails adjusted as appropriate  - Keep care items and personal belongings within reach  - Initiate and maintain comfort rounds  - Make Fall Risk Sign visible to staff  - Offer Toileting every 2 Hours, in advance of need  - Initiate/Maintain alarm  - Obtain necessary fall risk management equipment: non-skid footwear, pt instructed to call for assistance, call bell in reach and pt rings appropriately, pt in room near nurses' station  - Apply yellow socks and bracelet for high fall risk patients  - Consider moving patient to room near nurses station  Outcome: Progressing     Problem: DISCHARGE PLANNING  Goal: Discharge to home or other facility with appropriate resources  Description: INTERVENTIONS:  - Identify barriers to discharge w/patient and caregiver  - Arrange for needed discharge resources and transportation as appropriate  - Identify discharge learning needs (meds, wound care, etc.)  - Arrange for interpretive services to assist at discharge as needed  - Refer to Case Management Department for coordinating discharge planning if the patient needs post-hospital services based on physician/advanced practitioner order or complex needs related to functional status, cognitive ability, or social support system  Outcome: Progressing     Problem: Knowledge Deficit  Goal: Patient/family/caregiver demonstrates understanding of disease process, treatment plan, medications, and discharge instructions  Description: Complete learning assessment and assess knowledge base.   Interventions:  - Provide teaching at level of understanding  - Provide teaching via preferred learning methods  Outcome: Progressing

## 2023-10-24 NOTE — ANESTHESIA PROCEDURE NOTES
Peripheral Block    Patient location during procedure: holding area  Start time: 10/24/2023 9:16 AM  Reason for block: at surgeon's request and post-op pain management  Staffing  Performed by: Latasha Curtis MD  Authorized by: Latasha Curtis MD    Preanesthetic Checklist  Completed: patient identified, IV checked, site marked, risks and benefits discussed, surgical consent, monitors and equipment checked, pre-op evaluation and timeout performed  Peripheral Block  Patient position: sitting  Prep: ChloraPrep  Patient monitoring: frequent blood pressure checks, continuous pulse oximetry and heart rate  Block type: Interscalene  Laterality: right  Injection technique: single-shot  Procedures: ultrasound guided, Ultrasound guidance required for the procedure to increase accuracy and safety of medication placement and decrease risk of complications.   Ultrasound permanent image saved  Needle  Needle type: Stimuplex   Needle gauge: 20 G  Needle length: 4 in  Needle localization: anatomical landmarks and ultrasound guidance  Assessment  Injection assessment: incremental injection, frequent aspiration, injected with ease, negative aspiration, negative for heart rate change, no paresthesia on injection, no symptoms of intraneural/intravenous injection and needle tip visualized at all times  Paresthesia pain: none  Post-procedure:  site cleaned  patient tolerated the procedure well with no immediate complications  Additional Notes  With T2/Intercostobrachial field block

## 2023-10-24 NOTE — DISCHARGE INSTR - AVS FIRST PAGE
What to Expect Before and After Shoulder Replacement Surgery  You are being scheduled for a shoulder replacement by Dr. Rafy Ortega to treat your shoulder condition. Here is some information which may help to answer questions that you may have. Please do not hesitate to reach out to our team to answer questions not addressed here. Before Surgery  You will be contacted the evening prior to your surgery to confirm the scheduled time of the procedure and when to arrive at the hospital.   Do not eat or drink anything after midnight the night before your surgery so that the anesthesia can be performed safely. If you have been fitted for a sling in the office prior to the surgery please remember to bring it to the hospital.  You will meet the anesthesiologist the morning of the surgery. The surgery is performed under a general anesthetic but they will also offer you a regional block (shot to numb the arm) to help control your post-operative pain as well as with a catheter that is left in place after the block. The catheter is connected to a small pump which will continue to provide numbing medicine and help prolong the pain control from the block. Unfortunately this catheter is not as effective as the initial block, but can still be very helpful in managing the pain. After Surgery and in the Hospital  The shoulder replacement surgery typically takes 60-90 minutes. When surgery is completed, your surgeon will update your family and friends on your condition and progress. You will remain in the recovery room for at least an hour or until the anesthesia has worn off and your blood pressure and pulse are stable. If you have pain, the nurses will give you medication. Once out of surgery, your surgeon will decide on how long you will be using a sling in order to protect and position your shoulder. However, this won't keep you from starting physical therapy.   Exercises typically begin on the day after surgery with emphasis on moving the shoulder, wrist, and hand. The physical therapist will be provided with a detailed protocol but typically the first 6 weeks are used to regain range of motion and then strengthening is initiated. Starting strengthening exercises too early may lead to complications. When You Are Discharged from the [facility] can expect to be released from the hospital the day after surgery (this may change if you have special needs or medical conditions). Before you are released, the treatment team (Orthopaedic surgery residents, physician assistants and physical therapists) will talk with you about the importance of limiting any sudden or stressful movements to the arm for several weeks or longer. Activities that involve pushing, pulling, and lifting should not be done until you are given permission from your surgeon. Your First Day at [address] [place] [address] may need help with your daily activities, so it is a good idea to have family and friends prepared to help you. It is okay to remove the sling and let the arm hang at the side so that you can get cleaned and change your clothes. To put on a shirt, place the bad arm in first and then the good arm. Reverse to take it off. Don't forget to wear the sling every night for at least the first month after surgery, and never use your arm to push yourself up in bed or from a chair. The added weight on your shoulder may cause you to re-injure the joint. How to [place] in the First Week  You are encouraged to return to your normal eating and sleeping patterns as soon as possible. It is important for you to be active in order to control your weight and muscle tone. It is ok to increase your activity level and even perform light aerobic exercise (like walking or riding a stationary bike) within the first week or so if you are feeling up to it.   If there is concern about these activates best to wait until the first post-operative visit and discuss this with the suzanne Cason might be able to return to work within several days if you can perform your job while wearing a sling. Consult with your doctor, as this differs from patient to patient. However, if your job requires heavy lifting or climbing, there may be a delay for several months. Until you are seen for your first follow-up visit, please try and keep wound dry. It is okay to shower but try your best to keep the incision out of direct contact with the water (or consider using a waterproof bandage). If it does gets wet please dry as best as possible afterwards. If you notice any drainage or a foul odor from your incision or your temperature goes above 101.5 degrees, please contact the office. What You Can Expect in the First Month  Your first post-operative appointment will be with Dr Anita Moon physician assistant (PA) around 2 weeks after the surgery. You skin staples will be removed and X-rays will be obtained at that visit. Please understand that it is quite common to still experience pain at that time but the pain should be steadily improving. Hopefully physical therapy has already begun but if not it will be initiated at this visit and will continue for the 8-12 weeks. At about 12 weeks after surgery you will start a progressive strengthening program. Physical therapy is a deliberate process of not only strengthening your shoulder but also altering how you use your arm. It may be many months before your desired results are achieved, so do not get discouraged. Your shoulder will generally continue to improve steadily up to 6-8 months after surgery. After that point further improvement is very slow; although it has been shown that even after a year or more, activity can increase as muscle strength continues to improve. After the First Month at Home   Because each person heals differently, there are different recovery timelines. An average recovery period typically lasts about between 3-6 months. Talk with your surgeon about which activities will be appropriate for you once you have recovered

## 2023-10-24 NOTE — CONSULTS
Internal Medicine  Consultation Note    Patient: Hamilton Mcardle  Age/sex: 57 y.o. female  Medical Record #: [id number]    Assessment/Plan    Status Post right Total Shoulder Arthroplasty  Continue post op pain control measures as prescribed. Follow bowel regimen to help decrease narcotic induced constipation. Follow post operative hemoglobin with serial CBC and treat accordingly. Monitor WBC and fever curve post op while encouraging use of incentive spirometer. DVT prophylaxis in place and reviewed. Impaired fasting glucose  Hgb A1c 6.1  Monitor FBS    H/o DVT/LLE and PE  Resume eliquis POD #1  Requires lifelong  F/b hematology    Chronic LE edema  Hold lasix in the post operative setting  Can resume on dc    Hypothyroid  Cont levothyroxine as prescribed    Obesity  Recommend ongoing attempts at weight loss  Current BMI 37.7    Mood disorder  Continue medications as prescribed        PRE-OP HGB LEVEL: 13.4    Subjective/ HPI: Hamilton Mcardle was seen and examined. Hx of shoulder pain failed out patient conservative measures. Elected to undergo total shoulder arthroplasty We are asked to see patient for post op management of underlying medical co-morbidities as outlined above. Pt did well intra and post operatively with good hemodynamics. Pt currently comfortable and without any reported post op nausea. ROS:   A 10 point ROS was performed; negative except as noted above.      Social History:    Substance Use History:   Social History     Substance and Sexual Activity   Alcohol Use Not Currently    Alcohol/week: 2.0 standard drinks of alcohol    Types: 1 Glasses of wine, 1 Standard drinks or equivalent per week    Comment: occasionally     Social History     Tobacco Use   Smoking Status Former    Packs/day: 0.20    Years: 20.00    Total pack years: 4.00    Types: Cigarettes    Start date: 0    Quit date: 2008    Years since quitting: 15.8   Smokeless Tobacco Never   Tobacco Comments    quit Social History     Substance and Sexual Activity   Drug Use Never    Comment: na       Family History:    Family History   Problem Relation Age of Onset    Breast cancer Mother 40    Migraines Mother     Arthritis Mother     Kidney disease Father     Heart disease Father     Diabetes Father     Arthritis Father     Brain cancer Brother     Seizures Brother          Review of Scheduled Meds:  Current Facility-Administered Medications   Medication Dose Route Frequency Provider Last Rate    acetaminophen  650 mg Oral Q6H PRN Melody Padilla PA-C      [START ON 10/25/2023] amLODIPine  5 mg Oral Daily Melody Padilla, PA-YO      [START ON 10/25/2023] apixaban  5 mg Oral BID Melody Padilla, PA-C      [START ON 10/25/2023] ARIPiprazole  5 mg Oral QAM Melody Padilla, PA-C      atorvastatin  40 mg Oral Daily With [employer], PA-C      calcium carbonate  1,000 mg Oral Daily PRN Melody Padilla, PA-C      cefazolin  2,000 mg Intravenous Q8H Melody Padilla, PA-C      cholecalciferol  1,000 Units Oral Daily Melody Padilla, PA-C      docusate sodium  100 mg Oral BID Melody Padilla, PA-C      gabapentin  600 mg Oral TID Melody Padilla, PA-C      hydrALAZINE  25 mg Oral Q8H PRN MOON Giles      hydrOXYzine HCL  50 mg Oral HS PRN AVINASH Alba-C      lactated ringers  1,000 mL Intravenous Once PRN AVINASH Alba-C      And    lactated ringers  1,000 mL Intravenous Once PRN Melody Padilla, PA-C      lactated ringers  125 mL/hr Intravenous Continuous Michae Apgar, MD Stopped (10/24/23 1136)    lactated ringers  100 mL/hr Intravenous Continuous Melody Padilla, PA-C 100 mL/hr (10/24/23 1136)    [START ON 10/25/2023] lamoTRIgine  200 mg Oral Daily Melody Padilla, PA-YO      meclizine  12.5 mg Oral Q8H PRN Melody Padilla, PA-C      methocarbamol  750 mg Oral Q8H PRN Melody Padilla, PA-C      morphine injection  2 mg Intravenous Q2H PRN Majel Curio, PA-C      ondansetron  4 mg Intravenous Q6H PRN Majel Curio, PA-C      oxyCODONE  10 mg Oral Q4H PRN Majel Curio, PA-C      oxyCODONE  5 mg Oral Q4H PRN Majel Curio, PA-C      potassium chloride  20 mEq Oral Daily Majel Curio, PA-C      rimegepant sulfate  75 mg Oral Daily PRN Majel Curio, PA-C         Labs: Invalid input(s): "LABGLOM", "CMP"      Results from last 7 days   Lab Units 10/24/23  0759   INR  0.95          Results from last 7 days   Lab Units 10/24/23  1109   POC GLUCOSE mg/dl 108       Lab Results   Component Value Date    URINECX No Growth <1000 cfu/mL 2016    URINECX No Growth <1000 cfu/mL 2016       Input and Output Summary (last 24 hours): Intake/Output Summary (Last 24 hours) at 10/24/2023 1343  Last data filed at 10/24/2023 1101  Gross per 24 hour   Intake 1268.35 ml   Output 75 ml   Net 1193.35 ml       Imaging:     XR shoulder right 1 view    (Results Pending)       *Labs /Radiology studiesLabs reviewed  *Medications reviewed and reconciled as needed  *Please refer to order section for additional ordered labs studies  *Case discussed with primary attending during morning huddle case rounds    Vitals:   Temp (24hrs), Av.7 °F (37.1 °C), Min:98.4 °F (36.9 °C), Max:99 °F (37.2 °C)    Temp:  [98.4 °F (36.9 °C)-99 °F (37.2 °C)] 98.4 °F (36.9 °C)  HR:  [76-88] 86  Resp:  [18-21] 20  BP: (119-158)/(54-83) 140/83  SpO2:  [95 %-100 %] 95 %  Body mass index is 37.71 kg/m².      Physical Exam:   General Appearance: no distress, conversive  HEENT: PERRLA, conjuctiva normal; oropharynx clear; mucous membranes moist;   Neck:  Supple, no lymphadenopathy or thyromegaly  Lungs: CTA, normal respiratory effort, no retractions, expiratory effort normal  CV: regular rate and rhythm , PMI normal   ABD: soft non tender, no masses , no hepatic or splenomegaly  EXT: DP pulses intact, no lymphadenopathy, no edema ;  right shoulder dressing in place  Skin: normal turgor, normal texture, no rash  Psych: affect normal, mood normal  Neuro: AAOx3          Invasive Devices       Peripheral Intravenous Line  Duration             Peripheral IV 10/24/23 Dorsal (posterior); Left Forearm <1 day                       Code Status: Level 1 - Full Code  Current Length of Stay: 0 day(s)    Total floor / unit time spent today 30 minutes  Coordination of patient's care was performed in conjunction with primary service. Time invested included review of patient's labs, vitals, and management of their comorbidities with continued monitoring, examination of patient as well as answering patient questions, documenting her findings and creating progress note in electronic medical record,  ordering appropriate diagnostic testing. Medical decision making for the day was made by supervising physician unless otherwise noted in their attestation statement. ** Please Note: Fluency Direct voice to text software may have been used in the creation of this document.  Audio transcription errors may occur**

## 2023-10-24 NOTE — PROGRESS NOTES
Progress Note - Orthopedics   [place]amp [place] 57 y.o. female MRN: [id number]  Unit/Bed#: -01      Subjective:    57 y. o.female POD 0 R rTSA. No acute events, pain well controlled. Patient resting comfortably in room. No acute complaints except for strength reduction secondary to nerve block.     Labs:  0   Lab Value Date/Time    HCT 41.1 09/22/2023 0903    HCT 39.4 09/18/2023 1216    HCT 40.7 08/18/2023 1000    HCT 38.1 07/07/2015 1604    HCT 33.9 (L) 07/03/2015 0537    HCT 33.3 (L) 07/01/2015 0516    HGB 13.4 09/22/2023 0903    HGB 13.2 09/18/2023 1216    HGB 13.6 08/18/2023 1000    HGB 12.3 07/07/2015 1604    HGB 10.9 (L) 07/03/2015 0537    HGB 10.8 (L) 07/01/2015 0516    INR 0.95 10/24/2023 0759    INR 0.90 05/07/2015 1030    WBC 14.11 (H) 09/22/2023 0903    WBC 14.48 (H) 09/18/2023 1216    WBC 10.23 (H) 08/18/2023 1000    WBC 7.66 07/07/2015 1604    WBC 8.47 07/03/2015 0537    WBC 8.02 07/01/2015 0516    ESR 24 (H) 10/05/2019 0548       Meds:    Current Facility-Administered Medications:     acetaminophen (TYLENOL) tablet 650 mg, 650 mg, Oral, Q6H PRN, Krystina Madrid PA-C    [START ON 10/25/2023] amLODIPine (NORVASC) tablet 5 mg, 5 mg, Oral, Daily, MOON Holly    [START ON 10/25/2023] apixaban (ELIQUIS) tablet 5 mg, 5 mg, Oral, BID, Krystina Madrid PA-C    [START ON 10/25/2023] ARIPiprazole (ABILIFY) tablet 5 mg, 5 mg, Oral, QAM, Krystina Madrid PA-C    atorvastatin (LIPITOR) tablet 40 mg, 40 mg, Oral, Daily With Dinner, Krystina Madrid PA-C    calcium carbonate (TUMS) chewable tablet 1,000 mg, 1,000 mg, Oral, Daily PRN, Krystina Madrid PA-C    ceFAZolin (ANCEF) IVPB (premix in dextrose) 2,000 mg 50 mL, 2,000 mg, Intravenous, Q8H, Krystina Madrid PA-C    cholecalciferol (VITAMIN D3) tablet 1,000 Units, 1,000 Units, Oral, Daily, Krystina Madrid PA-C    docusate sodium (COLACE) capsule 100 mg, 100 mg, Oral, BID, Krystina Madrid PA-C    gabapentin (NEURONTIN) capsule 600 mg, 600 mg, Oral, TID, Patrick Levee, PA-C    hydrALAZINE (APRESOLINE) tablet 25 mg, 25 mg, Oral, Q8H PRN, MOON Larson    hydrOXYzine HCL (ATARAX) tablet 50 mg, 50 mg, Oral, HS PRN, Patrick Levee, PA-C    lactated ringers bolus 1,000 mL, 1,000 mL, Intravenous, Once PRN **AND** lactated ringers bolus 1,000 mL, 1,000 mL, Intravenous, Once PRN, Patrick Levee, PA-C    lactated ringers infusion, 125 mL/hr, Intravenous, Continuous, Cesar Jacome MD, Stopped at 10/24/23 1136    lactated ringers infusion, 100 mL/hr, Intravenous, Continuous, Patrick Tyler PA-C, Last Rate: 100 mL/hr at 10/24/23 1348, 100 mL/hr at 10/24/23 1348    [START ON 10/25/2023] lamoTRIgine (LaMICtal) tablet 200 mg, 200 mg, Oral, Daily, Patrick Levadrian, PA-C    meclizine (ANTIVERT) tablet 12.5 mg, 12.5 mg, Oral, Q8H PRN, Patrick Levadrian, PA-C    methocarbamol (ROBAXIN) tablet 750 mg, 750 mg, Oral, Q8H PRN, Patrick Levee, PA-C    morphine injection 2 mg, 2 mg, Intravenous, Q2H PRN, Patrick Levadrian, PA-C    [facility]ansetron [facility]) injection 4 mg, 4 mg, Intravenous, Q6H PRN, Patrick Levee, PA-C    oxyCODONE (ROXICODONE) immediate release tablet 10 mg, 10 mg, Oral, Q4H PRN, Patrick Nayeli, PA-C, 10 mg at 10/24/23 1454    oxyCODONE (ROXICODONE) IR tablet 5 mg, 5 mg, Oral, Q4H PRN, Patrick Levee, PA-C    potassium chloride (K-DUR,KLOR-CON) CR tablet 20 mEq, 20 mEq, Oral, Daily, Patrick Levee, PA-C    Blood Culture:   No results found for: "BLOODCX"    Wound Culture:   No results found for: "WOUNDCULT"    Ins and Outs:  I/O last 24 hours: In: 1268.4 [I.V.:1218.4; IV Piggyback:50]  Out: 75 [Blood:75]          Physical:  Vitals:    10/24/23 1542   BP: 149/77   Pulse: 105   Resp: 17   Temp:    SpO2: 92%     Musculoskeletal: right Upper Extremity  Skin warm dry . No erythema or ecchymosis.   Dressing changed, was saturated most distally, new dressing c/d/i  No TTP  Nerve block in place (interscalene). Patient with intact motor to ain/pin/u albeit reduced strength, numbness of hand but intact SILT to m/r/u distributions. 2+ radial pulse  Digits warm and well perfused  Capillary refill < 2 seconds    Assessment:    57 y. o.female POD 0 R rTSA. Patient doing well postoperatively.      Plan:  NWB RUE in sling  Will monitor for ABLA and administer IVF/prbc as indicated for Greater than 2 gram drop or Hgb < 7  PT/OT  Pain control  DVT ppx only mechanical/ambulation required, otherwise restart home eliquis tomorrow  [address] [facility] team recs    Shola Slater MD

## 2023-10-24 NOTE — CONSULTS
Consultation - Nephrology   Arnaldo Noland 57 y.o. female MRN: [id number]  Unit/Bed#: OR POOL Encounter: [id number]    ASSESSMENT and PLAN:  Suspect chronic kidney disease IIIA    Etiology: Suspect secondary to hypertensive nephrosclerosis, arterial nephrosclerosis, and obesity related hyperperfusion  Baseline creatinine 0.9-1.0 back to 2020  Recent creatinine on 9/22/2023 was 0.97 with EGFR 61  Last dose Lasix was 3 weeks ago  Not follow with nephrology as outpatient  Patient on Celebrex daily  Recommendation:  IV fluids per primary team  Avoid nephrotoxins, NSAIDs and IV fluids   hold NSAIDs to include Toradol and Celebrex admitted  Avoid diuretics  Check BMP in a.m. Avoid hypotension to prevent decreased renal perfusion,  Follow with PCP on discharge    Blood pressure/hypertension:  Current BP 140s to 150s  Home medications include: Amlodipine 5 mg daily, torsemide 20 mg as needed,  Current medications include: Amlodipine 5 mg daily  Maximize hemodynamics to maintain MAP >65  Avoid hypotension or fluctuations in blood pressure  Will continue to trend    Osteoarthritis right shoulder   Status post right shoulder with reverse total shoulder arthroplasty 10/24/2023  Orthopedics following        Medical records through [facility] and [address] [place] [address] has been reviewed for this patient encounter    HISTORY OF PRESENT ILLNESS:  Requesting Physician: Michael Martinez,*  Reason for Consult: Perioperative optimization to reduce the incidence of acute kidney injury in the setting of CKD    Arnaldo Noland is a 57 y.o. female who has PMH of hypertension, hyperlipidemia, iron deficiency anemia, obesity status post prior bariatric surgery, fibromyalgia, history of CVA, DVT, bipolar disorder, adrenal insufficiency, primary osteoarthritis was electively admitted and underwent right shoulder with reverse total shoulder arthroplasty today.  A renal consultation is requested today for perioperative optimization to reduce the incidence of acute kidney injury in the setting of suspected CKD. Patient reports he takes Lasix when her legs swell with last dose 3 weeks ago.   Takes Celebrex daily    PAST MEDICAL HISTORY:  Past Medical History:   Diagnosis Date    Adrenal insufficiency (Jayess's disease) ([address])     Anxiety     Arthritis     Bilateral pulmonary contusion 7/3/2020    Bipolar disorder     Cervical radiculopathy     Chest pain     seen in ER 9/18, dx with gas    Chronic back pain     Chronic pain disorder     lumbar    Closed head injury with brief loss of consciousness ([address]) 10/15/2019    Contusion of right hand 7/3/2021    Depression     DVT, lower extremity ([address])     1991 and 2019 now on eliquis    Exercises 5 to 6 times per week     5 days per week with weights/band and also goes to PT 2x/week    Fall down stairs 7/3/2020    Fibromyalgia     Hematoma of parietal scalp 7/3/2020    History of Clostridioides difficile infection     History of MRSA infection     pt denies having this    History of recent fall 07/2021    "possibly injured left shoulder'    History of TIAs     cannot remember details    Hypertension     Hypokalemia     Intractable migraine without aura and without status migrainosus 10/2/2019    Left shoulder pain     "not too bad" goes to PT 2x/week    Migraine     Psychiatric disorder     Anxiety, major depression, bipolar    Spinal stenosis     Stroke [facility])     pt reports" not remembering having a stroke"    Syncope 2014    orthostatic hypotension    Wears dentures     upper    Wears glasses        PAST SURGICAL HISTORY:  Past Surgical History:   Procedure Laterality Date    APPENDECTOMY      CAST APPLICATION Left 79/81/0674    Procedure: Application short-arm splint;  Surgeon: Sharona Larson MD;  Location: [facility];  Service: Orthopedics    COLONOSCOPY      FL INJECTION LEFT SHOULDER (ARTHROGRAM)  11/10/2021    GASTRIC RESTRICTION SURGERY      Gastric Sleeve Nov 2015    HYSTERECTOMY      DONALD    JOINT REPLACEMENT      Left Knee 2011 and Right Hip 2010    OOPHORECTOMY      ORIF WRIST FRACTURE Left 07/04/2020    Procedure: Open reduction and internal fixation left radius and ulnar shaft fracture;  Surgeon: Lili Baca MD;  Location: [facility];  Service: Orthopedics    ND ARTHROPLASTY GLENOHUMERAL JOINT TOTAL SHOULDER Left 03/30/2021    Procedure: ARTHROPLASTY SHOULDER - anatomic;  Surgeon: Nena Castro MD;  Location: [facility];  Service: Orthopedics    ND NATIVIDAD SHOULDER ARTHRPLSTY HUMERAL&GLENOID COMPNT Left 12/21/2021    Procedure: REVISION OF TOTAL SHOULDER ARTHROPLASTY TO REVERSE TOTAL SHOULDER ARTHROPLASTY;  Surgeon: Meryle Banana, MD;  Location: [facility];  Service: Orthopedics       ALLERGIES:  Allergies   Allergen Reactions    Ketorolac Itching and Other (See Comments)     [toradol] Itching, hives    Mushroom Extract Complex - Food Allergy Hives       SOCIAL HISTORY:  Social History     Substance and Sexual Activity   Alcohol Use Not Currently    Alcohol/week: 2.0 standard drinks of alcohol    Types: 1 Glasses of wine, 1 Standard drinks or equivalent per week    Comment: occasionally     Social History     Substance and Sexual Activity   Drug Use Never    Comment: na     Social History     Tobacco Use   Smoking Status Former    Packs/day: 0.20    Years: 20.00    Total pack years: 4.00    Types: Cigarettes    Start date: 0    Quit date: 2008    Years since quitting: 15.8   Smokeless Tobacco Never   Tobacco Comments    quit       FAMILY HISTORY:  Family History   Problem Relation Age of Onset    Breast cancer Mother 40    Migraines Mother     Arthritis Mother     Kidney disease Father     Heart disease Father     Diabetes Father     Arthritis Father     Brain cancer Brother     Seizures Brother        MEDICATIONS:    Current Facility-Administered Medications:     [START ON 10/25/2023] amLODIPine (NORVASC) tablet 5 mg, 5 mg, Oral, Daily, Helen Ruiz PA-C    [START ON 10/25/2023] apixaban (ELIQUIS) tablet 5 mg, 5 mg, Oral, BID, Melody Padilla PA-C    ARIPiprazole (ABILIFY) tablet 5 mg, 5 mg, Oral, QAM, Melody Padilla PA-C    atorvastatin (LIPITOR) tablet 40 mg, 40 mg, Oral, Daily With Deshaun Abdalla PA-C    cholecalciferol (VITAMIN D3) tablet 1,000 Units, 1,000 Units, Oral, Daily, Melody Padilla PA-C    fentaNYL (SUBLIMAZE) injection 25 mcg, 25 mcg, Intravenous, Q5 Min PRN, Michae Apgar, MD, 25 mcg at 10/24/23 1114    gabapentin (NEURONTIN) tablet 600 mg, 600 mg, Oral, TID, Melody Padilla PA-C    hydrOXYzine HCL (ATARAX) tablet 50 mg, 50 mg, Oral, HS PRN, Melody Padilla PA-C    lactated ringers infusion, 125 mL/hr, Intravenous, Continuous, Michae Apgar, MD, Stopped at 10/24/23 1136    lactated ringers infusion, 100 mL/hr, Intravenous, Continuous, Melody Padilla PA-C, Last Rate: 100 mL/hr at 10/24/23 1136, 100 mL/hr at 10/24/23 1136    lamoTRIgine (LaMICtal) tablet 200 mg, 200 mg, Oral, Daily, Melody Padilla PA-C    lidocaine (PF) (XYLOCAINE-MPF) 1 % injection 0.5 mL, 0.5 mL, Infiltration, Once PRN, Michae Apgar, MD    meclizine (ANTIVERT) tablet 12.5 mg, 12.5 mg, Oral, Q8H PRN, Melody Padilla PA-C    methocarbamol (ROBAXIN) tablet 750 mg, 750 mg, Oral, Q8H PRN, Melody Padilla PA-C    potassium chloride (K-DUR,KLOR-CON) CR tablet 20 mEq, 20 mEq, Oral, Daily, Melody Padilla PA-C    rimegepant sulfate (NURTEC) disintegrating tablet 75 mg, 75 mg, Oral, Daily PRN, Melody Padilla PA-C      REVIEW OF SYSTEMS:  Patient seen and examined at bedside  Review of Systems   Constitutional: Negative. Negative for activity change, appetite change, chills, diaphoresis, fatigue and fever. HENT: Negative. Negative for congestion and facial swelling. Respiratory: Negative. Cardiovascular: Negative. Gastrointestinal: Negative. Endocrine: Negative. Genitourinary: Negative. Musculoskeletal: Negative. Right shoulder discomfort and burning post procedure   Skin: Negative. Allergic/Immunologic: Negative. Neurological: Negative. Hematological: Negative. Psychiatric/Behavioral: Negative. PHYSICAL EXAM:  Current weight: Weight - Scale: 112 kg (248 lb)  Vitals:    10/24/23 1115 10/24/23 1130 10/24/23 1145 10/24/23 1200   BP: 144/54 149/66 135/61 150/68   BP Location:       Pulse: 80 82 78 78   Resp: 21 20 21 20   Temp:       TempSrc:       SpO2: 96% 99% 98% 96%   Weight:       Height:           Physical Exam  Vitals and nursing note reviewed. Constitutional:       Appearance: Normal appearance. She is obese. HENT:      Head: Normocephalic and atraumatic. Nose: Nose normal.      Mouth/Throat:      Mouth: Mucous membranes are moist.      Pharynx: Oropharynx is clear. Eyes:      Extraocular Movements: Extraocular movements intact. Conjunctiva/sclera: Conjunctivae normal.   Cardiovascular:      Rate and Rhythm: Normal rate and regular rhythm. Pulses: Normal pulses. Heart sounds: Normal heart sounds. Pulmonary:      Effort: Pulmonary effort is normal.      Breath sounds: Normal breath sounds. Abdominal:      General: Bowel sounds are normal.      Palpations: Abdomen is soft. Musculoskeletal:         General: Normal range of motion. Cervical back: Normal range of motion. Comments: Right shoulder in sling. Skin:     General: Skin is warm and dry. Neurological:      General: No focal deficit present. Mental Status: She is alert and oriented to person, place, and time. Psychiatric:         Mood and Affect: Mood normal.         Behavior: Behavior normal.         Lab Results:         Portions of the record may have been created with voice recognition software. Occasional wrong word or "sound a like" substitutions may have occurred due to the inherent limitations of voice recognition software.  Read the chart carefully and recognize,

## 2023-10-25 VITALS
SYSTOLIC BLOOD PRESSURE: 129 MMHG | BODY MASS INDEX: 37.59 KG/M2 | RESPIRATION RATE: 17 BRPM | HEIGHT: 68 IN | WEIGHT: 248 LBS | TEMPERATURE: 98.3 F | HEART RATE: 89 BPM | DIASTOLIC BLOOD PRESSURE: 83 MMHG | OXYGEN SATURATION: 92 %

## 2023-10-25 LAB
ANION GAP SERPL CALCULATED.3IONS-SCNC: 7 MMOL/L
BUN SERPL-MCNC: 10 MG/DL (ref 5–25)
CALCIUM SERPL-MCNC: 8.4 MG/DL (ref 8.4–10.2)
CHLORIDE SERPL-SCNC: 104 MMOL/L (ref 96–108)
CO2 SERPL-SCNC: 26 MMOL/L (ref 21–32)
CREAT SERPL-MCNC: 0.58 MG/DL (ref 0.6–1.3)
ERYTHROCYTE [DISTWIDTH] IN BLOOD BY AUTOMATED COUNT: 14.5 % (ref 11.6–15.1)
GFR SERPL CREATININE-BSD FRML MDRD: 97 ML/MIN/1.73SQ M
GLUCOSE P FAST SERPL-MCNC: 93 MG/DL (ref 65–99)
GLUCOSE SERPL-MCNC: 93 MG/DL (ref 65–140)
HCT VFR BLD AUTO: 34.1 % (ref 34.8–46.1)
HGB BLD-MCNC: 10.9 G/DL (ref 11.5–15.4)
MCH RBC QN AUTO: 30.6 PG (ref 26.8–34.3)
MCHC RBC AUTO-ENTMCNC: 32 G/DL (ref 31.4–37.4)
MCV RBC AUTO: 96 FL (ref 82–98)
PLATELET # BLD AUTO: 346 THOUSANDS/UL (ref 149–390)
PMV BLD AUTO: 10.7 FL (ref 8.9–12.7)
POTASSIUM SERPL-SCNC: 4.2 MMOL/L (ref 3.5–5.3)
RBC # BLD AUTO: 3.56 MILLION/UL (ref 3.81–5.12)
SODIUM SERPL-SCNC: 137 MMOL/L (ref 135–147)
WBC # BLD AUTO: 18.47 THOUSAND/UL (ref 4.31–10.16)

## 2023-10-25 PROCEDURE — NC001 PR NO CHARGE: Performed by: NURSE PRACTITIONER

## 2023-10-25 PROCEDURE — 97535 SELF CARE MNGMENT TRAINING: CPT

## 2023-10-25 PROCEDURE — 97166 OT EVAL MOD COMPLEX 45 MIN: CPT

## 2023-10-25 PROCEDURE — 85027 COMPLETE CBC AUTOMATED: CPT | Performed by: PHYSICIAN ASSISTANT

## 2023-10-25 PROCEDURE — 99232 SBSQ HOSP IP/OBS MODERATE 35: CPT | Performed by: INTERNAL MEDICINE

## 2023-10-25 PROCEDURE — NC001 PR NO CHARGE: Performed by: ORTHOPAEDIC SURGERY

## 2023-10-25 PROCEDURE — 99232 SBSQ HOSP IP/OBS MODERATE 35: CPT | Performed by: NURSE PRACTITIONER

## 2023-10-25 PROCEDURE — 99214 OFFICE O/P EST MOD 30 MIN: CPT | Performed by: INTERNAL MEDICINE

## 2023-10-25 PROCEDURE — 80048 BASIC METABOLIC PNL TOTAL CA: CPT | Performed by: PHYSICIAN ASSISTANT

## 2023-10-25 RX ADMIN — DOCUSATE SODIUM 100 MG: 100 CAPSULE, LIQUID FILLED ORAL at 09:44

## 2023-10-25 RX ADMIN — OXYCODONE HYDROCHLORIDE 10 MG: 10 TABLET ORAL at 04:54

## 2023-10-25 RX ADMIN — LAMOTRIGINE 200 MG: 100 TABLET ORAL at 09:44

## 2023-10-25 RX ADMIN — GABAPENTIN 600 MG: 300 CAPSULE ORAL at 09:44

## 2023-10-25 RX ADMIN — APIXABAN 5 MG: 5 TABLET, FILM COATED ORAL at 09:44

## 2023-10-25 RX ADMIN — CEFAZOLIN SODIUM 2000 MG: 2 SOLUTION INTRAVENOUS at 00:03

## 2023-10-25 RX ADMIN — METHOCARBAMOL 750 MG: 750 TABLET ORAL at 09:49

## 2023-10-25 RX ADMIN — OXYCODONE HYDROCHLORIDE 10 MG: 10 TABLET ORAL at 00:02

## 2023-10-25 RX ADMIN — Medication 1000 UNITS: at 09:44

## 2023-10-25 RX ADMIN — ARIPIPRAZOLE 5 MG: 5 TABLET ORAL at 09:45

## 2023-10-25 RX ADMIN — OXYCODONE HYDROCHLORIDE 10 MG: 10 TABLET ORAL at 09:49

## 2023-10-25 NOTE — PROGRESS NOTES
Progress Note - Orthopedics   Nida Showers 57 y.o. female MRN: [id number]  Unit/Bed#: -01      Subjective:    57 y. o.female POD 1 R rTSA. No acute events, no new complaints. Pain well controlled. Patient states she has a little more strength and sensation in her hand and the nerve block is beginning to wear off.     Labs:  0   Lab Value Date/Time    HCT 41.1 09/22/2023 0903    HCT 39.4 09/18/2023 1216    HCT 40.7 08/18/2023 1000    HCT 38.1 07/07/2015 1604    HCT 33.9 (L) 07/03/2015 0537    HCT 33.3 (L) 07/01/2015 0516    HGB 13.4 09/22/2023 0903    HGB 13.2 09/18/2023 1216    HGB 13.6 08/18/2023 1000    HGB 12.3 07/07/2015 1604    HGB 10.9 (L) 07/03/2015 0537    HGB 10.8 (L) 07/01/2015 0516    INR 0.95 10/24/2023 0759    INR 0.90 05/07/2015 1030    WBC 14.11 (H) 09/22/2023 0903    WBC 14.48 (H) 09/18/2023 1216    WBC 10.23 (H) 08/18/2023 1000    WBC 7.66 07/07/2015 1604    WBC 8.47 07/03/2015 0537    WBC 8.02 07/01/2015 0516    ESR 24 (H) 10/05/2019 0548       Meds:    Current Facility-Administered Medications:     acetaminophen (TYLENOL) tablet 650 mg, 650 mg, Oral, Q6H PRN, Geri Nelson PA-C    [START ON 10/25/2023] amLODIPine (NORVASC) tablet 5 mg, 5 mg, Oral, Daily, MOON Buchanan    [START ON 10/25/2023] apixaban (ELIQUIS) tablet 5 mg, 5 mg, Oral, BID, TafAVINASH Sarkar-C    [START ON 10/25/2023] ARIPiprazole (ABILIFY) tablet 5 mg, 5 mg, Oral, QAM, Taffy Omar, PA-C    atorvastatin (LIPITOR) tablet 40 mg, 40 mg, Oral, Daily With Dinner, Geri Nelson PA-C, 40 mg at 10/24/23 1709    calcium carbonate (TUMS) chewable tablet 1,000 mg, 1,000 mg, Oral, Daily PRN, Geri Nelson PA-C    ceFAZolin (ANCEF) IVPB (premix in dextrose) 2,000 mg 50 mL, 2,000 mg, Intravenous, Q8H, Geri Nelson PA-C, Last Rate: 100 mL/hr at 10/24/23 1709, 2,000 mg at 10/24/23 1709    cholecalciferol (VITAMIN D3) tablet 1,000 Units, 1,000 Units, Oral, Daily, Geri Nelson PA-C, 1,000 Units at 10/24/23 1709    docusate sodium (COLACE) capsule 100 mg, 100 mg, Oral, BID, AVINASH Mcdaniel-YO, 100 mg at 10/24/23 1709    gabapentin (NEURONTIN) capsule 600 mg, 600 mg, Oral, TID, Lakeshia Hearn, PA-C, 600 mg at 10/24/23 2040    hydrALAZINE (APRESOLINE) tablet 25 mg, 25 mg, Oral, Q8H PRN, MOON Arellano    hydrOXYzine HCL (ATARAX) tablet 50 mg, 50 mg, Oral, HS PRN, Lakeshia Hearn, PA-C, 50 mg at 10/24/23 1851    lactated ringers bolus 1,000 mL, 1,000 mL, Intravenous, Once PRN **AND** lactated ringers bolus 1,000 mL, 1,000 mL, Intravenous, Once PRN, BRIANNA McdanielC    lactated ringers infusion, 125 mL/hr, Intravenous, Continuous, Caroline Ware MD, Stopped at 10/24/23 1136    lactated ringers infusion, 100 mL/hr, Intravenous, Continuous, Lakeshia Hearn PA-C, Last Rate: 100 mL/hr at 10/24/23 1348, 100 mL/hr at 10/24/23 1348    [START ON 10/25/2023] lamoTRIgine (LaMICtal) tablet 200 mg, 200 mg, Oral, Daily, Lakeshia Hearn PA-C    meclizine (ANTIVERT) tablet 12.5 mg, 12.5 mg, Oral, Q8H PRN, Lakeshia Hearn PA-C    methocarbamol (ROBAXIN) tablet 750 mg, 750 mg, Oral, Q8H PRN, Lakeshia Hearn PA-C    morphine injection 2 mg, 2 mg, Intravenous, Q2H PRN, Lakeshia Hearn PA-C    ondansetron [facility]) injection 4 mg, 4 mg, Intravenous, Q6H PRN, Lakeshia Hearn PA-C    oxyCODONE (ROXICODONE) immediate release tablet 10 mg, 10 mg, Oral, Q4H PRN, Lakeshia Hearn PA-C, 10 mg at 10/24/23 1854    oxyCODONE (ROXICODONE) IR tablet 5 mg, 5 mg, Oral, Q4H PRN, Lakeshia Hearn PA-C    potassium chloride (K-DUR,KLOR-CON) CR tablet 20 mEq, 20 mEq, Oral, Daily, Lakeshia Hearn PA-C    Blood Culture:   No results found for: "BLOODCX"    Wound Culture:   No results found for: "WOUNDCULT"    Ins and Outs:  I/O last 24 hours:   In: 1268.4 [I.V.:1218.4; IV Piggyback:50]  Out: 375 [Urine:300; Blood:75]          Physical:  Vitals:    10/24/23 0866 BP: 130/82   Pulse: 98   Resp: 16   Temp: 97.9 °F (36.6 °C)   SpO2: 93%     Musculoskeletal: right Upper Extremity  Skin warm dry . No erythema or ecchymosis. Dressing c/d/I reinforced with abd over mepilex, no strikethrough  TTP aubree incisionally  Sensation intact to median/ulnar nerve distribution, slightly reduced to radial nerve distribution dorsal 1st webspace  Motor intact anterior interosseous nerve/posterior interosseous nerve/median/radial/ulnar nerve distributions  Digits warm and well perfused  Capillary refill < 2 seconds  Sling in place    Assessment:    57 y. o.female POD 1 R rTSA doing well postoperatively .      Plan:  NWB RUE in sling  Will monitor for ABLA and administer IVF/prbc as indicated for Greater than 2 gram drop or Hgb < 7   PT/OT  Pain control  DVT ppx mechanical, ambulation  Appreciate [facility] medicine team  Hopeful discharge home today     David Iverson MD

## 2023-10-25 NOTE — PROGRESS NOTES
Peripheral Nerve Block Follow-up Note - Acute Pain Service    Divina Garner 57 y.o. female MRN: [id number]  Unit/Bed#: -01 Encounter: [id number]      Assessment:   Principal Problem:    Primary osteoarthritis of right shoulder    Divina Garner is a 57y.o. year old female who is postop day 1 from a right reverse total shoulder arthroplasty with orthopedics. Plan:   - Right interscalene block with Exparel is functioning appropriately with expected sensory deficits; right axillary sparing is noted. Multimodal analgesia with:  Changed Tylenol to 975 mg every 8 hours  Discontinue Tylenol as needed  Gabapentin 600 mg 3 times a day, home medication  Estimated Creatinine Clearance: 128.6 mL/min (A) (by C-G formula based on SCr of 0.58 mg/dL (L)). Discontinue morphine  Robaxin 750 mg every 8 hours as needed for muscle spasms, home medication  Oxycodone 5 mg every 4 hours as needed for moderate pain  Oxycodone 10 mg every 4 hours as needed for severe pain  The patient is advised to apply ice or cold packs intermittently as needed to relieve pain; apply ice for no more than 20 minutes at a time. Bowel management  Colace 100 mg twice a day  Add Senokot daily to prevent opioid-induced constipation    Postoperative pain is present however controlled with current analgesic regimen. She is okay for discharge from a pain management standpoint. Would recommend to continue Tylenol, gabapentin, Robaxin and short course of oxycodone at time of discharge. Treatment plan and medications were reviewed with patient, bedside nursing staff and primary care service. APS will sign off at this time. Thank you for the consult. All opioids and other analgesics to be written at discretion of primary team. Please contact Acute Pain Service - [facility] via [facility] from [phone] with additional questions or concerns. See [facility] or [facility] for additional contacts and after hours information.     Pain History  Current pain location(s): right shoulder/axillary region   Pain Scale:   6-10  24 hour history: Patient resting in bed, no acute distress. Reporting pain in her lower part of her right shoulder/axillary/chest wall region. Pain improves with oxycodone administration. Overall feels well. Tolerating current medication regimen. Denied headache, dizziness, nausea, vomiting, shortness of breath or respiratory distress. Reports paresthesias in her right thumb first and second fingers with associated motor deficits, consistent with peripheral block. SPO2 92% on room air. Able to take a deep breath. Opioid requirement previous 24 hours: oxycodone 40mg, IV dilaudid 1mg    I have reviewed the patient's controlled substance dispensing history in the Prescription Drug Monitoring Program in compliance with the [facility] regulations before prescribing any controlled substances.        Meds/Allergies   all current active meds have been reviewed, current meds:   Current Facility-Administered Medications   Medication Dose Route Frequency    acetaminophen (TYLENOL) tablet 650 mg  650 mg Oral Q6H PRN    amLODIPine (NORVASC) tablet 5 mg  5 mg Oral Daily    apixaban (ELIQUIS) tablet 5 mg  5 mg Oral BID    ARIPiprazole (ABILIFY) tablet 5 mg  5 mg Oral QAM    atorvastatin (LIPITOR) tablet 40 mg  40 mg Oral Daily With Dinner    calcium carbonate (TUMS) chewable tablet 1,000 mg  1,000 mg Oral Daily PRN    cholecalciferol (VITAMIN D3) tablet 1,000 Units  1,000 Units Oral Daily    docusate sodium (COLACE) capsule 100 mg  100 mg Oral BID    gabapentin (NEURONTIN) capsule 600 mg  600 mg Oral TID    hydrALAZINE (APRESOLINE) tablet 25 mg  25 mg Oral Q8H PRN    hydrOXYzine HCL (ATARAX) tablet 50 mg  50 mg Oral HS PRN    lactated ringers bolus 1,000 mL  1,000 mL Intravenous Once PRN    And    lactated ringers bolus 1,000 mL  1,000 mL Intravenous Once PRN    lactated ringers infusion  125 mL/hr Intravenous Continuous    lactated ringers infusion  100 mL/hr Intravenous Continuous    lamoTRIgine (LaMICtal) tablet 200 mg  200 mg Oral Daily    meclizine (ANTIVERT) tablet 12.5 mg  12.5 mg Oral Q8H PRN    methocarbamol (ROBAXIN) tablet 750 mg  750 mg Oral Q8H PRN    morphine injection 2 mg  2 mg Intravenous Q2H PRN    ondansetron (ZOFRAN) injection 4 mg  4 mg Intravenous Q6H PRN    oxyCODONE (ROXICODONE) immediate release tablet 10 mg  10 mg Oral Q4H PRN    oxyCODONE (ROXICODONE) IR tablet 5 mg  5 mg Oral Q4H PRN    potassium chloride (K-DUR,KLOR-CON) CR tablet 20 mEq  20 mEq Oral Daily   , and PTA meds:   Prior to Admission Medications   Prescriptions Last Dose Informant Patient Reported? Taking?    ARIPiprazole (ABILIFY) 5 mg tablet 10/24/2023 at 0500 Self Yes Yes   Sig: TAKE 1 TABLET EVERY MORNING (CORRECTION BY DR OF PREV SCRIPT)   B-D INSULIN SYRINGE 1CC/25GX1" 25G X 1" 1 ML MISC  Self Yes No   Sig: USE WITH DHE   Trudhesa 0.725 MG/ACT AERS  Self Yes No   Ubrelvy 100 MG tablet Past Month Self Yes Yes   Sig: TAKE 1 TABLET TWICE A DAY AS NEEDED FOR HEADACHE, LIMIT 16 TABS PER MONTH   amLODIPine (NORVASC) 5 mg tablet 10/23/2023 Self Yes Yes   Sig: Take 5 mg by mouth daily   apixaban (Eliquis) 5 mg 10/20/2023  No Yes   Sig: Take 1 tablet (5 mg total) by mouth 2 (two) times a day   atorvastatin (LIPITOR) 40 mg tablet 10/23/2023 at 1900 Self Yes Yes   Sig: TAKE 1 TABLET BY MOUTH EVERY DAY AT NIGHT   celecoxib (CeleBREX) 200 mg capsule 10/17/2023  No No   Sig: TAKE 1 CAPSULE BY MOUTH EVERY DAY   cholecalciferol (VITAMIN D3) 1,000 units tablet 9/27/2023 Self Yes Yes   dihydroergotamine (DHE) 1 mg/mL Past Week Self Yes Yes   Sig: INJECT 1 ML (1 MG TOTAL) INJECT INTO THE MUSCLE AS NEEDED FOR MIGRAINE.   furosemide (LASIX) 20 mg tablet Past Month Self Yes Yes   Sig: Take 20 mg by mouth as needed Pt reports calls her [facility] cardiologist Dr Collin Bran before taking    gabapentin (NEURONTIN) 600 MG tablet 10/24/2023 at 0500 Self Yes Yes   Sig: Take 600 mg by mouth 3 (three) times a day hydrOXYzine HCL (ATARAX) 25 mg tablet 10/10/2023  No Yes   Sig: TAKE 2 TABLETS (50 MG TOTAL) BY MOUTH DAILY AT BEDTIME AS NEEDED (INSOMNIA)   lamoTRIgine (LaMICtal) 200 MG tablet 10/24/2023 at 0500 Self Yes Yes   Sig: Take 200 mg by mouth daily. meclizine (ANTIVERT) 12.5 MG tablet 10/22/2023 Self No No   Sig: Take 1 tablet (12.5 mg total) by mouth every 8 (eight) hours as needed for dizziness for up to 7 days   methocarbamol (ROBAXIN) 750 mg tablet 10/23/2023 at 1900 Self Yes Yes   Sig: TAKE 1 TABLET BY MOUTH 3 TIMES A DAY AS NEEDED FOR MUSCLE SPASMS. ondansetron (ZOFRAN-ODT) 4 mg disintegrating tablet 10/10/2023 Self Yes No   Sig: TAKE 1 TABLET BY MOUTH EVERY 8 HOURS AS NEEDED FOR NAUSEA AND VOMITING   potassium chloride (K-DUR,KLOR-CON) 20 mEq tablet Past Month Self Yes Yes   Sig: Take 20 mEq by mouth daily        Facility-Administered Medications: None       Allergies   Allergen Reactions    Ketorolac Itching and Other (See Comments)     [toradol] Itching, hives    Mushroom Extract Complex - Food Allergy Hives       Objective     Temp:  [97.6 °F (36.4 °C)-98.4 °F (36.9 °C)] 98.3 °F (36.8 °C)  HR:  [] 89  Resp:  [16-21] 17  BP: (117-158)/(54-83) 129/83    Physical Exam  Vitals reviewed. Constitutional:       General: She is awake. She is not in acute distress. Appearance: Normal appearance. She is not ill-appearing, toxic-appearing or diaphoretic. HENT:      Head: Normocephalic and atraumatic. Nose: Nose normal. No congestion or rhinorrhea. Mouth/Throat:      Mouth: Mucous membranes are moist.   Eyes:      Extraocular Movements: Extraocular movements intact. Cardiovascular:      Rate and Rhythm: Normal rate. Pulmonary:      Effort: Pulmonary effort is normal. No tachypnea, bradypnea or respiratory distress. Breath sounds: Decreased breath sounds present. No wheezing or rhonchi.    Musculoskeletal:      Comments: Right shoulder dressing intact  Right shoulder sling/immobilizer intact   Skin:     General: Skin is warm and dry. Coloration: Skin is pale. Findings: No rash. Neurological:      Mental Status: She is alert and oriented to person, place, and time. Mental status is at baseline. Sensory: Sensory deficit (right anterior shoulder) present. Motor: No tremor. Psychiatric:         Attention and Perception: Attention normal.         Mood and Affect: Mood normal. Mood is not anxious. Speech: Speech normal.         Behavior: Behavior normal. Behavior is cooperative. Lab Results:   Results from last 7 days   Lab Units 10/25/23  0537   WBC Thousand/uL 18.47*   HEMOGLOBIN g/dL 10.9*   HEMATOCRIT % 34.1*   PLATELETS Thousands/uL 346      Results from last 7 days   Lab Units 10/25/23  0528   POTASSIUM mmol/L 4.2   CHLORIDE mmol/L 104   CO2 mmol/L 26   BUN mg/dL 10   CREATININE mg/dL 0.58*   CALCIUM mg/dL 8.4       Counseling / Coordination of Care  Total floor / unit time spent today 15 minutes. Greater than 50% of total time was spent with the patient and / or family counseling and / or coordination of care. A description of the counseling / coordination of care: Chart review included vital signs, laboratory value, progress notes, past medical history, current and home medications. Postoperative pain management plan was reviewed, discussed opioid and nonopioid medications, the use of peripheral nerve block and duration of effectiveness of local anesthetics and nonpharmacological treatment such as ice. Treatment plan was reviewed with patient, bedside nursing staff and primary care service. Please note that the APS provides consultative services regarding pain management only. With the exception of ketamine, peripheral nerve catheters, and epidural infusions (and except when indicated), final decisions regarding starting or changing doses of analgesic medications are at the discretion of the consulting service.   Off hours consultation and/or medication management is generally not available.     MOON Amadro  Acute Pain Service

## 2023-10-25 NOTE — DISCHARGE SUMMARY
ORTHOPEDICS DISCHARGE SUMMARY  Clearance Jm 57 y.o. female MRN: [id number]  Unit/Bed#: -01    Attending Physician: Kaveh Munoz    Admitting diagnosis: Primary osteoarthritis of right shoulder [M19.011]    Discharge diagnosis: Primary osteoarthritis of right shoulder [M19.011]    Date of admission: 10/24/2023    Date of discharge: 10/25/23         Procedure: Right Shoulder with Reverse Total Shoulder Arthroplasty with Long Head Biceps Tenodesis     HPI:  This is a 57y.o. year old female that presented to the office with signs and symptoms of right shoulder osteoarthritis and/or other pathology. They tried and failed conservative treatment measures and wished to proceed with surgical intervention. The risks, benefits, and complications of the procedure were discussed with the patient and informed consent was obtained. Hospital Course: The patient was admitted to the hospital on 10/24/2023 and underwent an uncomplicated right reverse total shoulder arthroplasty. They were transferred to the floor after a brief stay in the post-anesthesia care unit. Their pain was well managed with IV and oral pain medications.  On discharge date pt was cleared by PT and the medicine team and determined to be safe for discharge    0   Lab Value Date/Time    HGB 10.9 (L) 10/25/2023 0537    HGB 13.4 09/22/2023 0903    HGB 13.2 09/18/2023 1216    HGB 13.6 08/18/2023 1000    HGB 13.0 05/19/2023 1342    HGB 13.0 02/17/2023 1133    HGB 12.9 01/09/2023 1110    HGB 11.0 (L) 10/11/2022 0944    HGB 12.1 09/15/2022 0949    HGB 9.7 (L) 12/22/2021 0824    HGB 10.9 (L) 12/06/2021 1620    HGB 11.2 (L) 07/06/2021 0636    HGB 10.8 (L) 07/03/2021 0520    HGB 10.4 (L) 07/02/2021 0524    HGB 11.5 07/01/2021 1707    HGB 10.9 (L) 04/06/2021 0539    HGB 11.4 (L) 04/05/2021 0538    HGB 11.9 04/02/2021 1055    HGB 12.5 03/31/2021 0630    HGB 13.1 03/17/2021 1421    HGB 10.0 (L) 07/06/2020 0513    HGB 10.1 (L) 07/05/2020 0532    HGB 11.6 07/04/2020 1249 HGB 10.8 (L) 07/04/2020 0509    HGB 11.8 07/03/2020 1653    HGB 13.6 06/27/2020 1400    HGB 11.6 10/16/2019 0448    HGB 12.2 10/15/2019 1800    HGB 11.4 (L) 10/03/2019 0542    HGB 12.3 10/02/2019 1510    HGB 10.2 (L) 07/08/2019 0636    HGB 9.3 (L) 07/06/2019 1154    HGB 9.7 (L) 06/05/2019 0630    HGB 9.7 (L) 06/03/2019 0546    HGB 9.9 (L) 06/02/2019 0558    HGB 10.2 (L) 05/31/2019 1436    HGB 8.9 (L) 04/12/2019 0608    HGB 8.9 (L) 04/11/2019 1244    HGB 8.7 (L) 04/11/2019 0557    HGB 10.9 (L) 04/07/2019 0858    HGB 9.2 (L) 04/06/2019 0556    HGB 10.0 (L) 04/05/2019 1328    HGB 10.1 (L) 08/12/2018 0823    HGB 9.5 (L) 07/22/2018 1709    HGB 10.5 (L) 07/09/2018 1157    HGB 9.9 (L) 06/23/2018 0403    HGB 10.6 (L) 06/22/2018 1756    HGB 11.5 01/23/2018 1843    HGB 11.7 01/04/2018 1018    HGB 10.9 (L) 12/21/2017 1822    HGB 12.4 10/08/2017 0740    HGB 13.4 08/09/2017 1023    HGB 11.1 (L) 08/03/2017 0556    HGB 13.3 07/29/2017 1627    HGB 12.6 06/04/2017 1309    HGB 12.7 06/03/2017 1218    HGB 12.1 05/29/2017 0442    HGB 12.9 05/28/2017 1057    HGB 13.8 11/12/2016 1222    HGB 14.7 09/11/2016 1134    HGB 14.3 07/21/2016 1105    HGB 13.7 06/23/2016 1034    HGB 12.3 07/07/2015 1604    HGB 10.9 (L) 07/03/2015 0537    HGB 10.8 (L) 07/01/2015 0516    HGB 11.7 06/30/2015 0440    HGB 13.2 06/29/2015 1155    HGB 12.0 05/08/2015 0450    HGB 13.1 05/07/2015 1030    HGB 12.9 04/04/2015 1510    HGB 13.3 11/12/2014 1315    HGB 12.7 11/06/2014 1635    HGB 12.6 10/07/2014 1709    HGB 12.0 10/01/2014 0616    HGB 12.8 09/30/2014 1430    HGB 13.0 09/13/2014 0525    HGB 13.9 09/12/2014 1355    HGB 12.2 08/15/2014 1446    HGB 12.7 07/30/2014 0415    HGB 13.7 07/29/2014 1155    HGB 12.7 07/08/2014 1936    HGB 11.9 06/29/2014 0645    HGB 12.8 06/29/2014 0013    HGB 11.9 06/18/2014 2207    HGB 11.1 (L) 05/25/2014 0530    HGB 10.8 (L) 05/24/2014 0450    HGB 11.5 05/23/2014 1850    HGB 12.5 02/22/2014 1918     Greater than 2 gram drop qualifies for acute blood loss anemia, patient was monitored and resuscitated with IVF as needed    Body mass index is 37.71 kg/m². morbidly obese. Recommend behavior modifications, nutrition, and physical activity. Discharge Instructions: The patient was discharged nonweight bearing to the right upper extremity. Take pain medications as instructed. Discharge Medications: For the complete list of discharge medications, please refer to the patient's medication reconciliation.

## 2023-10-25 NOTE — CASE MANAGEMENT
Case Management Progress Note    Patient name Gabriel Adorno  Location /-39 MRN [id number]  : 1960 Date 10/25/2023       LOS (days): 0  Geometric Mean LOS (GMLOS) (days):   Days to GMLOS:        OBJECTIVE:        Current admission status: Outpatient Surgery  Preferred Pharmacy:   [facility]/pharmacy #[id number] Shania Price, [address] [facility] [address] [facility]  Phone: [phone] Fax: [phone], [address] [facility] [address] [place] [address] [address] [facility] [address]  Phone: [phone] Fax: [phone]    [facility]/pharmacy #[id number] [address]  [address]  [address] [address]  Phone: [phone] Fax: [phone]    Primary Care Provider: Calvin Lopez MD    Primary Insurance: BLUE CROSS  Secondary Insurance:     PROGRESS NOTE: Per interdisciplinary team meeting, patient for discharge today. Cleared by therapy for OP services. Met with patient at bedside to discuss request for an aide. Informed patient that an aide is not covered by insurance without other disciplines. Offered patient the option to self pay. Patient verbalized understanding and declined aide services.  Patient provided CM with permission to cancel referral.

## 2023-10-25 NOTE — OCCUPATIONAL THERAPY NOTE
Occupational Therapy Evaluation     Patient Name: Caridad Kong  KQWCJ'L Date: 10/25/2023  Problem List  Principal Problem:    Primary osteoarthritis of right shoulder    Past Medical History  Past Medical History:   Diagnosis Date    Adrenal insufficiency ([place]'s disease) ([address])     Anxiety     Arthritis     Bilateral pulmonary contusion 7/3/2020    Bipolar disorder     Cervical radiculopathy     Chest pain     seen in ER 9/18, dx with gas    Chronic back pain     Chronic pain disorder     lumbar    Closed head injury with brief loss of consciousness ([address]) 10/15/2019    Contusion of right hand 7/3/2021    Depression     DVT, lower extremity ([address])     1991 and 2019 now on eliquis    Exercises 5 to 6 times per week     5 days per week with weights/band and also goes to PT 2x/week    Fall down stairs 7/3/2020    Fibromyalgia     Hematoma of parietal scalp 7/3/2020    History of Clostridioides difficile infection     History of MRSA infection     pt denies having this    History of recent fall 07/2021    "possibly injured left shoulder'    History of TIAs     cannot remember details    Hypertension     Hypokalemia     Intractable migraine without aura and without status migrainosus 10/2/2019    Left shoulder pain     "not too bad" goes to PT 2x/week    Migraine     Psychiatric disorder     Anxiety, major depression, bipolar    Spinal stenosis     Stroke [facility])     pt reports" not remembering having a stroke"    Syncope 2014    orthostatic hypotension    Wears dentures     upper    Wears glasses      Past Surgical History  Past Surgical History:   Procedure Laterality Date    APPENDECTOMY      CAST APPLICATION Left 58/12/2151    Procedure: Application short-arm splint;  Surgeon: Canary Goodpasture, MD;  Location: [facility];  Service: Orthopedics    COLONOSCOPY      FL INJECTION LEFT SHOULDER (ARTHROGRAM)  11/10/2021    GASTRIC RESTRICTION SURGERY      Gastric Sleeve Nov 2015    HYSTERECTOMY      DONALD    JOINT REPLACEMENT      Left Knee 2011 and Right Hip 2010    OOPHORECTOMY      ORIF WRIST FRACTURE Left 07/04/2020    Procedure: Open reduction and internal fixation left radius and ulnar shaft fracture;  Surgeon: Alfreda Sanches MD;  [facility];  Service: Orthopedics    CT ARTHROPLASTY GLENOHUMERAL JOINT TOTAL SHOULDER Left 03/30/2021    Procedure: ARTHROPLASTY SHOULDER - anatomic;  Surgeon: Jevon Mehta MD;  [facility];  Service: Orthopedics    CT ARTHROPLASTY GLENOHUMERAL JOINT TOTAL SHOULDER Right 10/24/2023    Procedure: ANATOMIC VS REVERSE TOTAL SHOULDER ARTHROPLASTY, WITH BICEPS TENDONESIS;  Surgeon: Nessa Villafuerte MD;  [facility];  Service: Orthopedics    CT [facility] HUMERAL&GLENOID COMPNT Left 12/21/2021    Procedure: REVISION OF TOTAL SHOULDER ARTHROPLASTY TO REVERSE TOTAL SHOULDER ARTHROPLASTY;  Surgeon: Nessa Villafuerte MD;  [facility];  Service: Orthopedics             10/25/23 0945   OT Last Visit   OT Visit Date 10/25/23   Note Type   Note type Evaluation   Pain Assessment   Pain Score 9   Pain Location/Orientation Location: Incision   Restrictions/Precautions   Weight Bearing Precautions Per Order Yes   RUE Weight Bearing Per Order (S)  NWB   Braces or Orthoses   (ABD sling)   Other Precautions WBS;Pain   Home Living   Type of [address] [place] [address] Dr One level;Stairs to enter with rails   Bathroom Shower/Tub Walk-in shower   Bathroom Toilet Raised   Bathroom Equipment Grab bars in shower; Shower chair   Bathroom Accessibility Accessible   Home Equipment Cane   Additional Comments Pt lives in a one level home with 2 BECCA, Denies use of DME PTA   Prior Function   Level of [place] Independent with ADLs; Independent with functional mobility; Independent with IADLS   Lives With Spouse   Receives Help From Family   IADLs Independent with driving; Independent with meal prep; Independent with medication management   Falls in the last 6 months 0   Vocational Retired   Lifestyle   Autonomy I with ADLS and IADLS +    Reciprocal Relationships supportive spouse who is also retired and can assist as needed upon d/c with [address] as well as driving   Service to Others retired   Intrinsic Gratification Cleaning   Subjective   Subjective "My  will help me"   ADL   Where Assessed Edge of bed   Eating Assistance 5  Supervision/Setup   Grooming Assistance 5  Supervision/Setup   [address] 4  Minimal Assistance    [address] 4  [address] 4  [address] 4  [address]  4  Minimal Assistance   Additional Comments Reports  can assist with same upon dc   Bed Mobility   Supine to Sit 5  Supervision   Additional Comments seated EOB At end of session   Transfers   Sit to Stand 6  Modified independent   Stand to Sit 6  Modified independent   Stand pivot 6  Modified independent   Additional Comments increased time   Functional Mobility   Functional Mobility 6  Modified independent   Additional Comments house hold distances no AD   Activity Tolerance   Activity Tolerance Patient tolerated treatment well   Medical Staff Made Aware NSG aware   Nurse Made Aware cleared for OTIE   RUE Assessment   RUE Assessment X  (TSA precautions in sling)   LUE Assessment   LUE Assessment WFL   Psychosocial   Psychosocial (WDL) WDL   Cognition   Overall Cognitive Status WFL   Arousal/Participation Alert; Responsive; Cooperative   Attention Within functional limits   Orientation Level Oriented X4   Memory Within functional limits   Following Commands Follows all commands and directions without difficulty   Comments Pleasant and cooperative   Assessment   Assessment Patient is a R hand dominant 57 y.o. female admitted to [facility] on 10/24/2023 due to Primary osteoarthritis of right shoulder. Pt now s/p R rTSA.  Pt performed at S- Mod I level with no OT needs identified at this time. PT ABLE TO COMPLETE FUNCTIONAL MOBILITY FOR GREATER THAN 100+ FEET AS WELL AS 2+ STEPS FOR STAIR MANAGEMENT AT AN OVERALL SUPERVISION LEVEL IN ORDER TO SAFELY ENTER/EXIT HOME ENVIRONMENT AND COMPLETE COMMUNITY MOBILITY. Comorbidities affecting pt's physical performance at time of assessment include  has a past medical history of Adrenal insufficiency ([place]'s disease) ([address]), Anxiety, Arthritis, Bilateral pulmonary contusion, Bipolar disorder, Cervical radiculopathy, Chest pain, Chronic back pain, Chronic pain disorder, Closed head injury with brief loss of consciousness ([address]), Contusion of right hand, Depression, DVT, lower extremity ([address]), Exercises 5 to 6 times per week, Fall down stairs, Fibromyalgia, Hematoma of parietal scalp, History of Clostridioides difficile infection, History of MRSA infection, History of recent fall, History of TIAs, Hypertension, Hypokalemia, Intractable migraine without aura and without status migrainosus, Left shoulder pain, Migraine, Psychiatric disorder, Spinal stenosis, Stroke ([address]), Syncope, Wears dentures, and Wears glasses. .  The patient's raw score on the AM-PAC Daily Activity inpatient short form is  , standardized score is  , greater than 39.4. Patients at this level are likely to benefit from DC to home. From OT standpoint recommend  outpatient therapy as recommended per TSA protocol  upon D/C. No further acute OT needs identified at this time. Recommend continued mobilization with hospital staff and restorative services while in the hospital to increase pt’s endurance and strength upon D/C. From OT standpoint, recommend D/C to home with family support when medically cleared. D/C pt from OT caseload at this time.    Goals   Patient Goals To go home   Plan   OT Frequency Eval only   Discharge Recommendation   OT Discharge Recommendation Home with outpatient rehabilitation   AM-PAC Daily Activity Inpatient   Lower Body Dressing 3   Bathing 3   Toileting 3   Upper Body Dressing 3   Grooming 4   Eating 4   Daily Activity Raw Score 20   Daily Activity Standardized Score (Calc for Raw Score >=11) 42.03   AM-PAC Applied Cognition Inpatient   Following a Speech/Presentation 4   Understanding Ordinary Conversation 4   Taking Medications 4   Remembering Where Things Are Placed or Put Away 4   Remembering List of 4-5 Errands 4   Taking Care of Complicated Tasks 4   Applied Cognition Raw Score 24   Applied Cognition Standardized Score 62.21   Additional Treatment Session   Start Time 0920   End Time 0945   Treatment Assessment Pt was seen for one additional OT tx session focusing on sling management, carryover of one handed dressing techniques, pendulum exercises and skilled education on TSA packet. Following verbal education, demonstration and practice pt reports understanding and demonstrates competency with managing ABD sling. Demonstrated understanding of one handed dressing techniques, despite needing some continued assist- pt report having support at home who can assist with same upon d/c and voices no concerns about same. Pt completes 1 set of 10 each pendulum exercises following demonstration and has no questions/concerns on same- given HEP in TSA packet. Pt has no further OT needs at this time. All questions/concerns were addressed. OT will sign off.    Additional Treatment Day 1

## 2023-10-25 NOTE — PLAN OF CARE
Problem: INFECTION - ADULT  Goal: Absence or prevention of progression during hospitalization  Description: INTERVENTIONS:  - Assess and monitor for signs and symptoms of infection  - Monitor lab/diagnostic results  - Monitor all insertion sites, i.e. indwelling lines, tubes, and drains  - Monitor endotracheal if appropriate and nasal secretions for changes in amount and color  - [place] appropriate cooling/warming therapies per order  - Administer medications as ordered  - Instruct and encourage patient and family to use good hand hygiene technique  - Identify and instruct in appropriate isolation precautions for identified infection/condition  Outcome: Adequate for Discharge     Problem: SAFETY ADULT  Goal: Patient will remain free of falls  Description: INTERVENTIONS:  - Educate patient/family on patient safety including physical limitations  - Instruct patient to call for assistance with activity   - Consult OT/PT to assist with strengthening/mobility   - Keep Call bell within reach  - Keep bed low and locked with side rails adjusted as appropriate  - Keep care items and personal belongings within reach  - Initiate and maintain comfort rounds  - Make Fall Risk Sign visible to staff  - Offer Toileting every 2-4 Hours, in advance of need  - Initiate/Maintain bed/chair alarm  - Obtain necessary fall risk management equipment: nonskid footwear   - Apply yellow socks and bracelet for high fall risk patients  - Consider moving patient to room near nurses station  Outcome: Adequate for Discharge     Problem: DISCHARGE PLANNING  Goal: Discharge to home or other facility with appropriate resources  Description: INTERVENTIONS:  - Identify barriers to discharge w/patient and caregiver  - Arrange for needed discharge resources and transportation as appropriate  - Identify discharge learning needs (meds, wound care, etc.)  - Arrange for interpretive services to assist at discharge as needed  - Refer to Case Management Department for coordinating discharge planning if the patient needs post-hospital services based on physician/advanced practitioner order or complex needs related to functional status, cognitive ability, or social support system  Outcome: Adequate for Discharge     Problem: MOBILITY - ADULT  Goal: Maintain or return to baseline ADL function  Description: INTERVENTIONS:  -  Assess patient's ability to carry out ADLs; assess patient's baseline for ADL function and identify physical deficits which impact ability to perform ADLs (bathing, care of mouth/teeth, toileting, grooming, dressing, etc.)  - Assess/evaluate cause of self-care deficits   - Assess range of motion  - Assess patient's mobility; develop plan if impaired  - Assess patient's need for assistive devices and provide as appropriate  - Encourage maximum independence but intervene and supervise when necessary  - Involve family in performance of ADLs  - Assess for home care needs following discharge   - Consider OT consult to assist with ADL evaluation and planning for discharge  - Provide patient education as appropriate  Outcome: Adequate for Discharge  Goal: Maintains/Returns to pre admission functional level  Description: INTERVENTIONS:  - Perform BMAT or MOVE assessment daily.   - Set and communicate daily mobility goal to care team and patient/family/caregiver. - Collaborate with rehabilitation services on mobility goals if consulted  - Perform Range of Motion 3 times a day. - Reposition patient every 2 hours.   - Dangle patient 3 times a day  - Stand patient 3 times a day  - Ambulate patient 3 times a day  - Out of bed to chair 3 times a day   - Out of bed for meals 3 times a day  - Out of bed for toileting  - Record patient progress and toleration of activity level   Outcome: Adequate for Discharge

## 2023-10-25 NOTE — PROGRESS NOTES
NEPHROLOGY PROGRESS NOTE   Dari Beth 57 y.o. female MRN: [id number]  Unit/Bed#: -01 Encounter: [id number]      ASSESSMENT & PLAN:  1. Suspected chronic kidney disease stage II/IIIa with previous creatinine of 0.9-1.0 back since 2020. Noted patient was taking Celebrex daily for the last couple of months. Kidney function is stable and below baseline after IV fluids  Discussed with patient about importance to avoid NSAIDs as much as possible    2. Osteoarthritis of the right shoulder status post reverse total shoulder arthroplasty. Postoperative management as per orthopedics. Avoid NSAIDs    3 hypertension, blood pressure is stable in the upper limit, continue with amlodipine, diuretics on hold, follow low-salt diet    SUBJECTIVE:  Seen and examined, feeling better, pain well controlled, to be discharged home today, denies any chest pain, no shortness of breath, no nausea, no vomiting.       OBJECTIVE:  Current Weight: Weight - Scale: 112 kg (248 lb)  Vitals:    10/25/23 0749   BP: 129/83   Pulse: 89   Resp: 17   Temp: 98.3 °F (36.8 °C)   SpO2: 92%       Intake/Output Summary (Last 24 hours) at 10/25/2023 1000  Last data filed at 10/25/2023 0749  Gross per 24 hour   Intake 1308.35 ml   Output 750 ml   Net 558.35 ml       General: obese, conscious, cooperative, in not acute distress  Eyes: conjunctivae pink, anicteric sclerae  ENT: lips and mucous membranes moist  Neck: supple, no JVD  Chest: clear breath sounds bilateral, no crackles, ronchus or wheezings  CVS: distinct S1 & S2, normal rate, regular rhythm  Abdomen: obese, non-tender, non-distended, normoactive bowel sounds  Extremities: no edema of both legs  Skin: no rash  Neuro: awake, alert, oriented      Medications:    Current Facility-Administered Medications:     acetaminophen (TYLENOL) tablet 650 mg, 650 mg, Oral, Q6H PRN, Alysha Bravo PA-C    amLODIPine (NORVASC) tablet 5 mg, 5 mg, Oral, Daily, Charlene Sprain, CRNP    apixaban Patsy Lucero) tablet 5 mg, 5 mg, Oral, BID, Delilah Barnhart, PA-C, 5 mg at 10/25/23 0944    ARIPiprazole (ABILIFY) tablet 5 mg, 5 mg, Oral, QAM, Delilah Barnhart, PA-C, 5 mg at 10/25/23 0945    atorvastatin (LIPITOR) tablet 40 mg, 40 mg, Oral, Daily With Jefferson Grossman PA-C, 40 mg at 10/24/23 1709    calcium carbonate (TUMS) chewable tablet 1,000 mg, 1,000 mg, Oral, Daily PRN, Delilah Barnhart, PA-C    cholecalciferol (VITAMIN D3) tablet 1,000 Units, 1,000 Units, Oral, Daily, Delilah Barnhart PA-C, 1,000 Units at 10/25/23 0944    docusate sodium (COLACE) capsule 100 mg, 100 mg, Oral, BID, Delilah Barnhart, PA-C, 100 mg at 10/25/23 0944    gabapentin (NEURONTIN) capsule 600 mg, 600 mg, Oral, TID, Delilah Barnhart, PA-C, 600 mg at 10/25/23 0944    hydrALAZINE (APRESOLINE) tablet 25 mg, 25 mg, Oral, Q8H PRN, MOON Evangelista    hydrOXYzine HCL (ATARAX) tablet 50 mg, 50 mg, Oral, HS PRN, Delilah Barnhart, PA-C, 50 mg at 10/24/23 1851    lactated ringers bolus 1,000 mL, 1,000 mL, Intravenous, Once PRN **AND** lactated ringers bolus 1,000 mL, 1,000 mL, Intravenous, Once PRN, Delilah Barnhart, PA-C    lactated ringers infusion, 125 mL/hr, Intravenous, Continuous, Wade Agustin MD, Stopped at 10/24/23 1136    lactated ringers infusion, 100 mL/hr, Intravenous, Continuous, Delilah Barnhart PA-C, Last Rate: 100 mL/hr at 10/24/23 1348, 100 mL/hr at 10/24/23 1348    lamoTRIgine (LaMICtal) tablet 200 mg, 200 mg, Oral, Daily, Dubenjie Barnhart PA-C, 200 mg at 10/25/23 0944    meclizine (ANTIVERT) tablet 12.5 mg, 12.5 mg, Oral, Q8H PRN, Delilah Barnhart PA-C    methocarbamol (ROBAXIN) tablet 750 mg, 750 mg, Oral, Q8H PRN, Delilah Barnhart PA-C, 750 mg at 10/25/23 0789    morphine injection 2 mg, 2 mg, Intravenous, Q2H PRN, Delilah Barnhart PA-C    ondansetron [facility]) injection 4 mg, 4 mg, Intravenous, Q6H PRN, Delilah Barnhart PA-C    oxyCODONE (ROXICODONE) immediate release tablet 10 mg, 10 mg, Oral, Q4H PRN, Priscille Dent, PA-C, 10 mg at 10/25/23 9370    oxyCODONE (ROXICODONE) IR tablet 5 mg, 5 mg, Oral, Q4H PRN, Priscille Dent, PA-C    potassium chloride (K-DUR,KLOR-CON) CR tablet 20 mEq, 20 mEq, Oral, Daily, Priscille Dent, PA-C    Invasive Devices:        Lab Results:   Results from last 7 days   Lab Units 10/25/23  0537 10/25/23  0528   WBC Thousand/uL 18.47*  --    HEMOGLOBIN g/dL 10.9*  --    HEMATOCRIT % 34.1*  --    PLATELETS Thousands/uL 346  --    SODIUM mmol/L  --  137   POTASSIUM mmol/L  --  4.2   CHLORIDE mmol/L  --  104   CO2 mmol/L  --  26   BUN mg/dL  --  10   CREATININE mg/dL  --  0.58*   CALCIUM mg/dL  --  8.4             Portions of the record may have been created with voice recognition software. Occasional wrong word or "sound a like" substitutions may have occurred due to the inherent limitations of voice recognition software. Read the chart carefully and recognize, using context, where substitutions have occurred. If you have any questions, please contact the dictating provider.

## 2023-10-25 NOTE — PROGRESS NOTES
Internal Medicine Progress Note  Patient: Isaias Pedro  Age/sex: 57 y.o. female  Medical Record #: [id number]      ASSESSMENT/PLAN: (Interval History)  Isaias Pedro is seen and examined and management for following issues:    Status Post right Total SHOULDER ARTHROPLASTY  Pain controlled  Continue encourage incentive spirometry; monitor fever curve  DVT prophylaxis in place and reviewed  Results from last 7 days   Lab Units 10/25/23  0537   WBC Thousand/uL 18.47*   HEMOGLOBIN g/dL 10.9*   HEMATOCRIT % 34.1*   PLATELETS Thousands/uL 346         Operative acute blood loss anemia  Decrease in hgb levels from preop labs results; suspect due to post operation extravasation losses along with potential dilutional effects of fluids  Monitor follow up CBC post intervention to trend effect    Impaired fasting glucose  Hgb A1c 6.1  Monitor FBS     H/o DVT/LLE and PE  Resume eliquis POD #1  Requires lifelong  F/b hematology     Chronic LE edema  Resume lasix on dc     Hypothyroid  Cont levothyroxine as prescribed     Obesity  Recommend ongoing attempts at weight loss  Current BMI 37.7     Mood disorder  Continue medications as prescribed           PRE-OP HGB LEVEL: 13.4=OK for dc from medicine standpoint    The above assessment and plan was reviewed and updated as determined by my evaluation of the patient on 10/25/2023.     Labs:   Results from last 7 days   Lab Units 10/25/23  0537   WBC Thousand/uL 18.47*   HEMOGLOBIN g/dL 10.9*   HEMATOCRIT % 34.1*   PLATELETS Thousands/uL 346     Results from last 7 days   Lab Units 10/25/23  0528   SODIUM mmol/L 137   POTASSIUM mmol/L 4.2   CHLORIDE mmol/L 104   CO2 mmol/L 26   BUN mg/dL 10   CREATININE mg/dL 0.58*   CALCIUM mg/dL 8.4         Results from last 7 days   Lab Units 10/24/23  0759   INR  0.95     Results from last 7 days   Lab Units 10/24/23  1109   POC GLUCOSE mg/dl 108       Review of Scheduled Meds:  Current Facility-Administered Medications   Medication Dose Route Frequency Provider Last Rate    acetaminophen  650 mg Oral Q6H PRN Ronny Edu, PA-C      amLODIPine  5 mg Oral Daily Deromeo Martin, MOON      apixaban  5 mg Oral BID Ronny Edu, PA-C      ARIPiprazole  5 mg Oral QAM Ronny Edu, PA-C      atorvastatin  40 mg Oral Daily With [employer], PA-C      calcium carbonate  1,000 mg Oral Daily PRN Ronny Edu, PA-C      cholecalciferol  1,000 Units Oral Daily Ronny Edu, PA-C      docusate sodium  100 mg Oral BID Ronny Edu, PA-C      gabapentin  600 mg Oral TID Ronny Edu, PA-C      hydrALAZINE  25 mg Oral Q8H PRN Deitra Parkers, MOON      hydrOXYzine HCL  50 mg Oral HS PRN Ronny Edu, PA-C      lactated ringers  1,000 mL Intravenous Once PRN Ronny Edu, PA-C      And    lactated ringers  1,000 mL Intravenous Once PRN Ronny Edu, PA-C      lactated ringers  125 mL/hr Intravenous Continuous Satnam Prader, MD Stopped (10/24/23 1136)    lactated ringers  100 mL/hr Intravenous Continuous Ronny Edu, PA-C 100 mL/hr (10/24/23 1348)    lamoTRIgine  200 mg Oral Daily Ronny Edu, PA-C      meclizine  12.5 mg Oral Q8H PRN Ronny Edu, PA-C      methocarbamol  750 mg Oral Q8H PRN Ronny Edu, PA-C      morphine injection  2 mg Intravenous Q2H PRN Ronny Edu, PA-C      ondansetron  4 mg Intravenous Q6H PRN Ronny Edu, PA-C      oxyCODONE  10 mg Oral Q4H PRN Ronny Edu, PA-C      oxyCODONE  5 mg Oral Q4H PRN Ronny Edu, PA-C      potassium chloride  20 mEq Oral Daily Ronny Edu, PA-C         Subjective/ HPI: Arnaldo Sales seen and examined. Patient's overnight issues or events were reviewed with nursing or staff during rounds or morning huddle session.  New or overnight issues include the following:     Pt without any overnight events or reported nursing issues, has some discomfort in the shoulder but otherwise anxious to go home      ROS:   A 10 point ROS was performed; negative except as noted above. Imaging:     XR shoulder right 1 view    (Results Pending)       *Labs /Radiology studies Reviewed  *Medications  reviewed and reconciled as needed  *Please refer to order section for additional ordered labs studies  *Case discussed with primary attending during morning huddle case rounds    Physical Examination:  Vitals:   Vitals:    10/24/23 1853 10/24/23 1856 10/24/23 2219 10/25/23 0749   BP: 130/82 130/82 130/65 129/83   BP Location:       Pulse:  98 85 89   Resp: 16 16 18 17   Temp: 97.9 °F (36.6 °C) 97.9 °F (36.6 °C) 97.9 °F (36.6 °C) 98.3 °F (36.8 °C)   TempSrc:       SpO2:  93% 93% 92%   Weight:       Height:           General Appearance: no distress, conversive  HEENT: PERRLA, conjuctiva normal; oropharynx clear; mucous membranes moist;   Neck:  Supple, no lymphadenopathy or thyromegaly  Lungs: CTA, normal respiratory effort, no retractions, expiratory effort normal  CV: regular rate and rhythm , PMI normal   ABD: soft non tender, no masses , no hepatic or splenomegaly  EXT: DP pulses intact, no lymphadenopathy, no edema; right shoulder dressing in place  Skin: normal turgor, normal texture, no rash  Psych: affect normal, mood normal  Neuro: AAOx3      The above physical exam was reviewed and updated as determined by my evaluation of the patient on 10/25/2023. Invasive Devices       Peripheral Intravenous Line  Duration             Peripheral IV 10/24/23 Dorsal (posterior); Left Forearm 1 day                       VTE Pharmacologic Prophylaxis:  eliquis  Code Status: Level 1 - Full Code  Current Length of Stay: 0 day(s)      Total floor / unit time spent today 30 minutes  Coordination of patient's care was performed in conjunction with primary service.  Time invested included review of patient's labs, vitals, and management of their comorbidities with continued monitoring, examination of patient as well as answering patient questions, documenting her findings and creating progress note in electronic medical record,  ordering appropriate diagnostic testing. Medical decision making for the day was made by supervising physician unless otherwise noted in their attestation statement. ** Please Note:  voice to text software may have been used in the creation of this document.  Although proof errors in transcription or interpretation are a potential of such software**

## 2023-10-25 NOTE — PHYSICAL THERAPY NOTE
Physical Therapy Screen    Patient Name: Syd Leyva    PTNPH'H Date: 10/25/2023     Problem List  Principal Problem:    Primary osteoarthritis of right shoulder       Past Medical History  Past Medical History:   Diagnosis Date    Adrenal insufficiency (Meacham's disease) ([address])     Anxiety     Arthritis     Bilateral pulmonary contusion 7/3/2020    Bipolar disorder     Cervical radiculopathy     Chest pain     seen in ER 9/18, dx with gas    Chronic back pain     Chronic pain disorder     lumbar    Closed head injury with brief loss of consciousness ([address]) 10/15/2019    Contusion of right hand 7/3/2021    Depression     DVT, lower extremity ([address])     1991 and 2019 now on eliquis    Exercises 5 to 6 times per week     5 days per week with weights/band and also goes to PT 2x/week    Fall down stairs 7/3/2020    Fibromyalgia     Hematoma of parietal scalp 7/3/2020    History of Clostridioides difficile infection     History of MRSA infection     pt denies having this    History of recent fall 07/2021    "possibly injured left shoulder'    History of TIAs     cannot remember details    Hypertension     Hypokalemia     Intractable migraine without aura and without status migrainosus 10/2/2019    Left shoulder pain     "not too bad" goes to PT 2x/week    Migraine     Psychiatric disorder     Anxiety, major depression, bipolar    Spinal stenosis     Stroke [facility])     pt reports" not remembering having a stroke"    Syncope 2014    orthostatic hypotension    Wears dentures     upper    Wears glasses         Past Surgical History  Past Surgical History:   Procedure Laterality Date    APPENDECTOMY      CAST APPLICATION Left 89/26/3587    Procedure: Application short-arm splint;  Surgeon: Isabel Dumont MD;  Location: [facility];  Service: Orthopedics    COLONOSCOPY      FL INJECTION LEFT SHOULDER (ARTHROGRAM)  11/10/2021    GASTRIC RESTRICTION SURGERY      Gastric Sleeve Nov 2015    HYSTERECTOMY      DONALD    JOINT REPLACEMENT      Left Knee 2011 and Right Hip 2010    OOPHORECTOMY      ORIF WRIST FRACTURE Left 07/04/2020    Procedure: Open reduction and internal fixation left radius and ulnar shaft fracture;  Surgeon: Guerrero Jones MD;  Location: [facility];  Service: Orthopedics    NJ ARTHROPLASTY GLENOHUMERAL JOINT TOTAL SHOULDER Left 03/30/2021    Procedure: ARTHROPLASTY SHOULDER - anatomic;  Surgeon: Tiffani Thomson MD;  Location: [facility];  Service: Orthopedics    NJ NATIVIDAD SHOULDER ARTHRPLSTY HUMERAL&GLENOID COMPNT Left 12/21/2021    Procedure: REVISION OF TOTAL SHOULDER ARTHROPLASTY TO REVERSE TOTAL SHOULDER ARTHROPLASTY;  Surgeon: Edenilson Blackwell MD;  Location: [facility];  Service: Orthopedics       PT orders received and chart reviewed. Per discussion with DWAYNE Rey, pt is mod I with all functional mobility and has sufficient social support at home for safe discharge. At this time, PT will sign off as no inpatient therapy needs are present. Please re-consult PT services if appropriate. Thank you.     Cholo Walker PT, DPT

## 2023-10-25 NOTE — PLAN OF CARE
Problem: PAIN - ADULT  Goal: Verbalizes/displays adequate comfort level or baseline comfort level  Description: Interventions:  - Encourage patient to monitor pain and request assistance  - Assess pain using appropriate pain scale  - Administer analgesics based on type and severity of pain and evaluate response  - Implement non-pharmacological measures as appropriate and evaluate response  - Consider cultural and social influences on pain and pain management  - Notify physician/advanced practitioner if interventions unsuccessful or patient reports new pain  Outcome: Progressing     Problem: INFECTION - ADULT  Goal: Absence or prevention of progression during hospitalization  Description: INTERVENTIONS:  - Assess and monitor for signs and symptoms of infection  - Monitor lab/diagnostic results  - Monitor all insertion sites, i.e. indwelling lines, tubes, and drains  - Monitor endotracheal if appropriate and nasal secretions for changes in amount and color  - Dickey appropriate cooling/warming therapies per order  - Administer medications as ordered  - Instruct and encourage patient and family to use good hand hygiene technique  - Identify and instruct in appropriate isolation precautions for identified infection/condition  Outcome: Progressing     Problem: SAFETY ADULT  Goal: Patient will remain free of falls  Description: INTERVENTIONS:  - Educate patient/family on patient safety including physical limitations  - Instruct patient to call for assistance with activity   - Consult OT/PT to assist with strengthening/mobility   - Keep Call bell within reach  - Keep bed low and locked with side rails adjusted as appropriate  - Keep care items and personal belongings within reach  - Initiate and maintain comfort rounds  - Make Fall Risk Sign visible to staff  - Offer Toileting every 2-4 Hours, in advance of need  - Initiate/Maintain call bell alarm  - Obtain necessary fall risk management equipment: nonskid socks   - Apply yellow socks and bracelet for high fall risk patients  - Consider moving patient to room near nurses station  Outcome: Progressing     Problem: DISCHARGE PLANNING  Goal: Discharge to home or other facility with appropriate resources  Description: INTERVENTIONS:  - Identify barriers to discharge w/patient and caregiver  - Arrange for needed discharge resources and transportation as appropriate  - Identify discharge learning needs (meds, wound care, etc.)  - Arrange for interpretive services to assist at discharge as needed  - Refer to Case Management Department for coordinating discharge planning if the patient needs post-hospital services based on physician/advanced practitioner order or complex needs related to functional status, cognitive ability, or social support system  Outcome: Progressing     Problem: Knowledge Deficit  Goal: Patient/family/caregiver demonstrates understanding of disease process, treatment plan, medications, and discharge instructions  Description: Complete learning assessment and assess knowledge base.   Interventions:  - Provide teaching at level of understanding  - Provide teaching via preferred learning methods  Outcome: Progressing     Problem: MOBILITY - ADULT  Goal: Maintain or return to baseline ADL function  Description: INTERVENTIONS:  -  Assess patient's ability to carry out ADLs; assess patient's baseline for ADL function and identify physical deficits which impact ability to perform ADLs (bathing, care of mouth/teeth, toileting, grooming, dressing, etc.)  - Assess/evaluate cause of self-care deficits   - Assess range of motion  - Assess patient's mobility; develop plan if impaired  - Assess patient's need for assistive devices and provide as appropriate  - Encourage maximum independence but intervene and supervise when necessary  - Involve family in performance of ADLs  - Assess for home care needs following discharge   - Consider OT consult to assist with ADL evaluation and planning for discharge  - Provide patient education as appropriate  Outcome: Progressing  Goal: Maintains/Returns to pre admission functional level  Description: INTERVENTIONS:  - Perform BMAT or MOVE assessment daily.   - Set and communicate daily mobility goal to care team and patient/family/caregiver. - Collaborate with rehabilitation services on mobility goals if consulted  - Perform Range of Motion 3 times a day. - Reposition patient every 3 hours.   - Dangle patient 3 times a day  - Stand patient 3 times a day  - Ambulate patient 3 times a day  - Out of bed to chair 3 times a day   - Out of bed for meals 3 times a day  - Out of bed for toileting  - Record patient progress and toleration of activity level   Outcome: Progressing

## 2023-10-26 ENCOUNTER — TELEPHONE (OUTPATIENT)
Age: 63
End: 2023-10-26

## 2023-10-26 ENCOUNTER — PATIENT OUTREACH (OUTPATIENT)
Dept: CASE MANAGEMENT | Facility: OTHER | Age: 63
End: 2023-10-26

## 2023-10-26 NOTE — PROGRESS NOTES
In basket ADT notifications received for patient admission and discharge for elective procedure: Right shoulder with reverse total shoulder arthroplasty. Patient active in outreach to ortho provider s/p surgery and is scheduled for PT tomorrow 10/27. No outreach conducted; will continue to monitor in surveillance at this time.

## 2023-10-26 NOTE — TELEPHONE ENCOUNTER
Regarding: Bleeding by incision  ----- Message from Cuca Nick sent at 10/26/2023  3:13 PM EDT -----  Patient is having some bleeding by her incision and a stitch is sticking out so she is very concerned. CB# [phone]    ----- Message from Cuca Nick sent at 10/26/2023  3:12 PM EDT -----  Patient is having some bleeding by her incision and a stitch is sticking out so she is very concerned. CB# [phone]    ----- Message from Cuca Nick sent at 10/26/2023  3:12 PM EDT -----  Patient is having some bleeding by her incision and a stitch is sticking out so she is very concerned.    CB# [phone]

## 2023-10-26 NOTE — TELEPHONE ENCOUNTER
On 10/24/23 right Anantomic VS Reverse Shoulder Arthroplasty w/Bicep Tendonesis. General w/block. Pt states her right arm is still numb. Her hand is still swollen and arm feels bloated and cannot move. Pt is in a brace. And it is very heavy to get onto a pillow. Pt states she has a staple that is starting to come out w/a little blood noted. There is a dsg still on and a patch on top with reinforcement. She is having problems elevating arm because it is so heavy and still blocked. She cannot bend elbow. She can move hand. Pt is taking pain meds intermittently. She will cover area w/staple and scant bleeding w/DSD. Please review and advise further. I am not sure that I understand what type of brace the pt is in. States she cannot bend elbow and can only do pendulum exercises. Not sure how to tell her to elevate arm to help with swelling . Thank you for your assistance.

## 2023-10-26 NOTE — TELEPHONE ENCOUNTER
Called and spoke w/pt and relayed Dr Tresa Dela Cruz to pt in detail. She states she understands info and will follow instructions. She will CB if she has any further problems or concerns.

## 2023-10-27 ENCOUNTER — APPOINTMENT (EMERGENCY)
Dept: CT IMAGING | Facility: HOSPITAL | Age: 63
End: 2023-10-27
Payer: COMMERCIAL

## 2023-10-27 ENCOUNTER — HOSPITAL ENCOUNTER (EMERGENCY)
Facility: HOSPITAL | Age: 63
Discharge: HOME | End: 2023-10-27
Attending: EMERGENCY MEDICINE
Payer: COMMERCIAL

## 2023-10-27 ENCOUNTER — TELEPHONE (OUTPATIENT)
Age: 63
End: 2023-10-27

## 2023-10-27 VITALS
SYSTOLIC BLOOD PRESSURE: 125 MMHG | RESPIRATION RATE: 17 BRPM | OXYGEN SATURATION: 94 % | TEMPERATURE: 98.6 F | HEART RATE: 102 BPM | DIASTOLIC BLOOD PRESSURE: 71 MMHG

## 2023-10-27 DIAGNOSIS — T88.7XXA MEDICATION SIDE EFFECT: ICD-10-CM

## 2023-10-27 DIAGNOSIS — R42 DIZZINESS: Primary | ICD-10-CM

## 2023-10-27 DIAGNOSIS — M19.011 PRIMARY OSTEOARTHRITIS OF RIGHT SHOULDER: Primary | ICD-10-CM

## 2023-10-27 LAB
ALBUMIN SERPL BCP-MCNC: 3 G/DL (ref 3.5–5)
ALP SERPL-CCNC: 123 U/L (ref 34–104)
ALT SERPL W P-5'-P-CCNC: 5 U/L (ref 7–52)
ANION GAP SERPL CALCULATED.3IONS-SCNC: 6 MMOL/L
AST SERPL W P-5'-P-CCNC: 14 U/L (ref 13–39)
BACTERIA UR QL AUTO: ABNORMAL /HPF
BASOPHILS # BLD AUTO: 0.1 THOUSANDS/ÂΜL (ref 0–0.1)
BASOPHILS NFR BLD AUTO: 1 % (ref 0–1)
BILIRUB SERPL-MCNC: 0.59 MG/DL (ref 0.2–1)
BILIRUB UR QL STRIP: NEGATIVE
BUN SERPL-MCNC: 14 MG/DL (ref 5–25)
CALCIUM ALBUM COR SERPL-MCNC: 9.3 MG/DL (ref 8.3–10.1)
CALCIUM SERPL-MCNC: 8.5 MG/DL (ref 8.4–10.2)
CHLORIDE SERPL-SCNC: 102 MMOL/L (ref 96–108)
CLARITY UR: CLEAR
CO2 SERPL-SCNC: 26 MMOL/L (ref 21–32)
COLOR UR: ABNORMAL
CREAT SERPL-MCNC: 0.78 MG/DL (ref 0.6–1.3)
EOSINOPHIL # BLD AUTO: 0.09 THOUSAND/ÂΜL (ref 0–0.61)
EOSINOPHIL NFR BLD AUTO: 1 % (ref 0–6)
ERYTHROCYTE [DISTWIDTH] IN BLOOD BY AUTOMATED COUNT: 14.4 % (ref 11.6–15.1)
GFR SERPL CREATININE-BSD FRML MDRD: 80 ML/MIN/1.73SQ M
GLUCOSE SERPL-MCNC: 139 MG/DL (ref 65–140)
GLUCOSE UR STRIP-MCNC: NEGATIVE MG/DL
HCT VFR BLD AUTO: 33.7 % (ref 34.8–46.1)
HGB BLD-MCNC: 11.2 G/DL (ref 11.5–15.4)
HGB UR QL STRIP.AUTO: ABNORMAL
HYALINE CASTS #/AREA URNS LPF: ABNORMAL /LPF
IMM GRANULOCYTES # BLD AUTO: 0.34 THOUSAND/UL (ref 0–0.2)
IMM GRANULOCYTES NFR BLD AUTO: 2 % (ref 0–2)
KETONES UR STRIP-MCNC: NEGATIVE MG/DL
LACTATE SERPL-SCNC: 1 MMOL/L (ref 0.5–2)
LEUKOCYTE ESTERASE UR QL STRIP: NEGATIVE
LYMPHOCYTES # BLD AUTO: 1.6 THOUSANDS/ÂΜL (ref 0.6–4.47)
LYMPHOCYTES NFR BLD AUTO: 9 % (ref 14–44)
MCH RBC QN AUTO: 30.5 PG (ref 26.8–34.3)
MCHC RBC AUTO-ENTMCNC: 33.2 G/DL (ref 31.4–37.4)
MCV RBC AUTO: 92 FL (ref 82–98)
MONOCYTES # BLD AUTO: 1.69 THOUSAND/ÂΜL (ref 0.17–1.22)
MONOCYTES NFR BLD AUTO: 10 % (ref 4–12)
MUCOUS THREADS UR QL AUTO: ABNORMAL
NEUTROPHILS # BLD AUTO: 13.8 THOUSANDS/ÂΜL (ref 1.85–7.62)
NEUTS SEG NFR BLD AUTO: 77 % (ref 43–75)
NITRITE UR QL STRIP: NEGATIVE
NON-SQ EPI CELLS URNS QL MICRO: ABNORMAL /HPF
NRBC BLD AUTO-RTO: 0 /100 WBCS
PH UR STRIP.AUTO: 5.5 [PH] (ref 4.5–8)
PLATELET # BLD AUTO: 392 THOUSANDS/UL (ref 149–390)
PMV BLD AUTO: 9.4 FL (ref 8.9–12.7)
POTASSIUM SERPL-SCNC: 3.8 MMOL/L (ref 3.5–5.3)
PROCALCITONIN SERPL-MCNC: 0.14 NG/ML
PROT SERPL-MCNC: 6.3 G/DL (ref 6.4–8.4)
PROT UR STRIP-MCNC: ABNORMAL MG/DL
RBC # BLD AUTO: 3.67 MILLION/UL (ref 3.81–5.12)
RBC #/AREA URNS AUTO: ABNORMAL /HPF
SODIUM SERPL-SCNC: 134 MMOL/L (ref 135–147)
SP GR UR STRIP.AUTO: 1.01 (ref 1–1.03)
UROBILINOGEN UR QL STRIP.AUTO: 0.2 E.U./DL
WBC # BLD AUTO: 17.62 THOUSAND/UL (ref 4.31–10.16)
WBC #/AREA URNS AUTO: ABNORMAL /HPF

## 2023-10-27 PROCEDURE — 96374 THER/PROPH/DIAG INJ IV PUSH: CPT

## 2023-10-27 PROCEDURE — 85025 COMPLETE CBC W/AUTO DIFF WBC: CPT | Performed by: EMERGENCY MEDICINE

## 2023-10-27 PROCEDURE — 36415 COLL VENOUS BLD VENIPUNCTURE: CPT | Performed by: EMERGENCY MEDICINE

## 2023-10-27 PROCEDURE — 99284 EMERGENCY DEPT VISIT MOD MDM: CPT

## 2023-10-27 PROCEDURE — 81001 URINALYSIS AUTO W/SCOPE: CPT

## 2023-10-27 PROCEDURE — 99285 EMERGENCY DEPT VISIT HI MDM: CPT | Performed by: EMERGENCY MEDICINE

## 2023-10-27 PROCEDURE — 96376 TX/PRO/DX INJ SAME DRUG ADON: CPT

## 2023-10-27 PROCEDURE — 83605 ASSAY OF LACTIC ACID: CPT | Performed by: EMERGENCY MEDICINE

## 2023-10-27 PROCEDURE — 70450 CT HEAD/BRAIN W/O DYE: CPT

## 2023-10-27 PROCEDURE — 84145 PROCALCITONIN (PCT): CPT | Performed by: EMERGENCY MEDICINE

## 2023-10-27 PROCEDURE — 80053 COMPREHEN METABOLIC PANEL: CPT | Performed by: EMERGENCY MEDICINE

## 2023-10-27 PROCEDURE — G1004 CDSM NDSC: HCPCS

## 2023-10-27 RX ORDER — FENTANYL CITRATE 50 UG/ML
50 INJECTION, SOLUTION INTRAMUSCULAR; INTRAVENOUS ONCE
Status: COMPLETED | OUTPATIENT
Start: 2023-10-27 | End: 2023-10-27

## 2023-10-27 RX ADMIN — FENTANYL CITRATE 50 MCG: 0.05 INJECTION, SOLUTION INTRAMUSCULAR; INTRAVENOUS at 14:31

## 2023-10-27 RX ADMIN — FENTANYL CITRATE 50 MCG: 0.05 INJECTION, SOLUTION INTRAMUSCULAR; INTRAVENOUS at 12:39

## 2023-10-27 NOTE — TELEPHONE ENCOUNTER
Called and spoke w/pt's , Melody Dominguez and relayed AILIN Pickett's msg. He states that pt is getting angry and combative and they do not do anything right. Asked if pt was confused, as when I spoke w/pt the other day she told me she was in a brace but she was actually in a sling. Attempted to ascertain is something more is going on with pt.  states the she is alert, seems oriented and is angry. Hear daughter in background saying that she is angry. He states that this is a change in pt's behavior since surgery. Prior surgeries pt went into rehab facilities so they have not experience this before. States they wanted to try to care for her at home but were unprepared for this as they can do nothing right for pt. States pt legs are weak and she had a hard time getting up stairs even with bars that he had installed to help her climb the 3 stairs into the house. Daughter is there from [facility] but is going home. They had to call daughter's  to come to help them get her into the car in order to go to PT today. Pt states he is concerned for pt's and his safety as he cannot manage pt alone. Please review and advise.

## 2023-10-27 NOTE — TELEPHONE ENCOUNTER
Caller: Patient    Doctor: Abel Greene    Reason for call: Patient is calling stating she tried to do PT it took numerous people to get her mobile the therapist told her it was unsafe that he will not do it.     Call back#: [phone]

## 2023-10-27 NOTE — TELEPHONE ENCOUNTER
We cannot send to rehab once she goes home. If they feel she is not acting like her normal self or if she is having difficulty ambulating and this is not normal for her, then they should take her to ER for evaluation. As stated prior, I did place an order for [facility] to come assist but I am not sure what frequency they will be coming out.

## 2023-10-27 NOTE — TELEPHONE ENCOUNTER
Called and spoke w/harry Ruelas and relayed AILIN Pickett's msg to him. They are currently going to PT. If the pt's conditions is not what it is normally, then they should consider taking her to ED for evaluation.  states he understands.

## 2023-10-27 NOTE — TELEPHONE ENCOUNTER
Caller: Patient    Doctor: Johann Coughlin    Reason for call:     Patient has a reverse Total right shoulder arthoplasty, she is asking for a script for   Home health care and physical therapy. She is asking for the nurse to call her and   Help her set this up, she has no family that can help her. Please give her a call to   Help. Surgery was on 10/24/23, she did have a fall but she is ok.     Call back#: [phone]

## 2023-10-27 NOTE — TELEPHONE ENCOUNTER
Caller: Patient's spouse     Doctor: Pravin Hobson    Reason for call: Patient's spouse requesting wife to have a home health aid he states it's getting hard at home to care for patient he states he has help with her as of right now with their daughter she leaves in a week and is going to need help with her after   Please advise     Call back#: [phone]

## 2023-10-27 NOTE — ED PROVIDER NOTES
History  Chief Complaint   Patient presents with    Post-op Problem     Patient had a shoulder surgery done on Tuesday at [facility]. Ever since, patient states she feels off and is unable to walk due to a lack of balance. Alert and oriented x4. Patient reports bouts of confusion. Denies issues with anaesthesia in the past.      13-year-old female with history of diabetes, bipolar depression, fibromyalgia, migraine headaches presents to the ED complaining of feeling off balance, "foggy" since yesterday. Patient had right shoulder replacement 4 days ago. She states she was prescribed a low-dose of OxyContin but stopped taking it last night secondary to the symptoms starting. She states that her symptoms have not improved. She denies headache, visual complaints, speech deficits, focal weakness. No fevers or chills. She complains of nausea without vomiting. History provided by:  Patient   used: No    Medical Problem  Location:  Feeling foggy and off balance x1 day  Onset quality:  Gradual  Duration:  1 day  Timing:  Constant  Progression:  Unchanged  Chronicity:  New  Context:  Recent right shoulder replacement, taking OxyContin for pain but stopped last evening  Relieved by:  Nothing  Worsened by:  Nothing  Associated symptoms: nausea    Associated symptoms: no abdominal pain, no chest pain, no congestion, no cough, no diarrhea, no ear pain, no fatigue, no fever, no headaches, no loss of consciousness, no myalgias, no rash, no rhinorrhea, no shortness of breath, no sore throat, no vomiting and no wheezing        Prior to Admission Medications   Prescriptions Last Dose Informant Patient Reported? Taking?    ARIPiprazole (ABILIFY) 5 mg tablet  Self Yes No   Sig: TAKE 1 TABLET EVERY MORNING (CORRECTION BY DR OF PREV SCRIPT)   B-D INSULIN SYRINGE 1CC/25GX1" 25G X 1" 1 ML MISC  Self Yes No   Sig: USE WITH DHE   Trudhesa 0.725 MG/ACT AERS  Self Yes No   Ubrelvy 100 MG tablet  Self Yes No   Sig: TAKE 1 TABLET TWICE A DAY AS NEEDED FOR HEADACHE, LIMIT 16 TABS PER MONTH   amLODIPine (NORVASC) 5 mg tablet  Self Yes No   Sig: Take 5 mg by mouth daily   apixaban (Eliquis) 5 mg   No No   Sig: Take 1 tablet (5 mg total) by mouth 2 (two) times a day   atorvastatin (LIPITOR) 40 mg tablet  Self Yes No   Sig: TAKE 1 TABLET BY MOUTH EVERY DAY AT NIGHT   celecoxib (CeleBREX) 200 mg capsule   No No   Sig: TAKE 1 CAPSULE BY MOUTH EVERY DAY   cholecalciferol (VITAMIN D3) 1,000 units tablet  Self Yes No   dihydroergotamine (DHE) 1 mg/mL  Self Yes No   Sig: INJECT 1 ML (1 MG TOTAL) INJECT INTO THE MUSCLE AS NEEDED FOR MIGRAINE.   furosemide (LASIX) 20 mg tablet  Self Yes No   Sig: Take 20 mg by mouth as needed Pt reports calls her [facility] cardiologist Dr Lei Hernandez before taking    gabapentin (NEURONTIN) 600 MG tablet  Self Yes No   Sig: Take 600 mg by mouth 3 (three) times a day   hydrOXYzine HCL (ATARAX) 25 mg tablet   No No   Sig: TAKE 2 TABLETS (50 MG TOTAL) BY MOUTH DAILY AT BEDTIME AS NEEDED (INSOMNIA)   lamoTRIgine (LaMICtal) 200 MG tablet  Self Yes No   Sig: Take 200 mg by mouth daily. meclizine (ANTIVERT) 12.5 MG tablet  Self No No   Sig: Take 1 tablet (12.5 mg total) by mouth every 8 (eight) hours as needed for dizziness for up to 7 days   methocarbamol (ROBAXIN) 750 mg tablet  Self Yes No   Sig: TAKE 1 TABLET BY MOUTH 3 TIMES A DAY AS NEEDED FOR MUSCLE SPASMS. ondansetron (ZOFRAN-ODT) 4 mg disintegrating tablet  Self Yes No   Sig: TAKE 1 TABLET BY MOUTH EVERY 8 HOURS AS NEEDED FOR NAUSEA AND VOMITING   oxyCODONE (ROXICODONE) 5 immediate release tablet   No No   Si tablets every 6 hours as needed for severe pain.    potassium chloride (K-DUR,KLOR-CON) 20 mEq tablet  Self Yes No   Sig: Take 20 mEq by mouth daily        Facility-Administered Medications: None       Past Medical History:   Diagnosis Date    Adrenal insufficiency ([place]'s disease) (HCC)     Anxiety     Arthritis     Bilateral pulmonary contusion 7/3/2020    Bipolar disorder     Cervical radiculopathy     Chest pain     seen in ER 9/18, dx with gas    Chronic back pain     Chronic pain disorder     lumbar    Closed head injury with brief loss of consciousness ([address]) 10/15/2019    Contusion of right hand 7/3/2021    Depression     DVT, lower extremity ([address])     1991 and 2019 now on eliquis    Exercises 5 to 6 times per week     5 days per week with weights/band and also goes to PT 2x/week    Fall down stairs 7/3/2020    Fibromyalgia     Hematoma of parietal scalp 7/3/2020    History of Clostridioides difficile infection     History of MRSA infection     pt denies having this    History of recent fall 07/2021    "possibly injured left shoulder'    History of TIAs     cannot remember details    Hypertension     Hypokalemia     Intractable migraine without aura and without status migrainosus 10/2/2019    Left shoulder pain     "not too bad" goes to PT 2x/week    Migraine     Psychiatric disorder     Anxiety, major depression, bipolar    Spinal stenosis     Stroke [facility])     pt reports" not remembering having a stroke"    Syncope 2014    orthostatic hypotension    Wears dentures     upper    Wears glasses        Past Surgical History:   Procedure Laterality Date    APPENDECTOMY      CAST APPLICATION Left 17/68/5457    Procedure: Application short-arm splint;  Surgeon: Pamela Jade MD;  [facility];  Service: Orthopedics    COLONOSCOPY      FL INJECTION LEFT SHOULDER (ARTHROGRAM)  11/10/2021    GASTRIC RESTRICTION SURGERY      Gastric Sleeve Nov 2015    HYSTERECTOMY      DONALD    JOINT REPLACEMENT      Left Knee 2011 and Right Hip 2010    OOPHORECTOMY      ORIF WRIST FRACTURE Left 07/04/2020    Procedure: Open reduction and internal fixation left radius and ulnar shaft fracture;  Surgeon: Pamela Jade MD;  [facility];  Service: Orthopedics    MO ARTHROPLASTY GLENOHUMERAL JOINT TOTAL SHOULDER Left 03/30/2021    Procedure: ARTHROPLASTY SHOULDER - anatomic;  Surgeon: Jevon Mehta MD;  [facility];  Service: Orthopedics    CT ARTHROPLASTY GLENOHUMERAL JOINT TOTAL SHOULDER Right 10/24/2023    Procedure: ANATOMIC VS REVERSE TOTAL SHOULDER ARTHROPLASTY, WITH BICEPS TENDONESIS;  Surgeon: Nessa Villafuerte MD;  [facility];  Service: Orthopedics    CT [place] SHOULDER ARTHRPLSTY HUMERAL&GLENOID COMPNT Left 12/21/2021    Procedure: REVISION OF TOTAL SHOULDER ARTHROPLASTY TO REVERSE TOTAL SHOULDER ARTHROPLASTY;  Surgeon: Nessa Villafuerte MD;  [facility];  Service: Orthopedics       Family History   Problem Relation Age of Onset    Breast cancer Mother 40    Migraines Mother     Arthritis Mother     Kidney disease Father     Heart disease Father     Diabetes Father     Arthritis Father     Brain cancer Brother     Seizures Brother      I have reviewed and agree with the history as documented. E-Cigarette/Vaping    E-Cigarette Use Never User     Cartridges/Day 0     Comments 0      E-Cigarette/Vaping Substances    Nicotine No     THC No     CBD No     Flavoring No     Other No     Unknown No      Social History     Tobacco Use    Smoking status: Former     Packs/day: 0.20     Years: 20.00     Total pack years: 4.00     Types: Cigarettes     Start date: 1998     Quit date: 2008     Years since quitting: 15.8    Smokeless tobacco: Never    Tobacco comments:     quit   Vaping Use    Vaping Use: Never used   Substance Use Topics    Alcohol use: Not Currently     Alcohol/week: 2.0 standard drinks of alcohol     Types: 1 Glasses of wine, 1 Standard drinks or equivalent per week     Comment: occasionally    Drug use: Never     Comment: na       Review of Systems   Constitutional: Negative. Negative for chills, diaphoresis, fatigue and fever. HENT: Negative. Negative for congestion, ear pain, rhinorrhea and sore throat. Eyes: Negative. Negative for discharge, redness and itching.    Respiratory: Negative. Negative for apnea, cough, chest tightness, shortness of breath and wheezing. Cardiovascular:  Negative for chest pain, palpitations and leg swelling. Gastrointestinal:  Positive for nausea. Negative for abdominal pain, diarrhea and vomiting. Endocrine: Negative. Genitourinary: Negative. Negative for flank pain, frequency and urgency. Musculoskeletal: Negative. Negative for back pain and myalgias. Skin: Negative. Negative for rash. Allergic/Immunologic: Negative. Neurological:  Positive for light-headedness. Negative for dizziness, tremors, seizures, loss of consciousness, syncope, facial asymmetry, speech difficulty, weakness, numbness and headaches. Hematological: Negative. All other systems reviewed and are negative. Physical Exam  Physical Exam  Vitals and nursing note reviewed. Constitutional:       General: She is awake. She is not in acute distress. Appearance: Normal appearance. She is well-developed and overweight. She is not ill-appearing, toxic-appearing or diaphoretic. HENT:      Head: Normocephalic and atraumatic. Right Ear: External ear normal.      Left Ear: External ear normal.      Nose: Nose normal.      Mouth/Throat:      Mouth: Mucous membranes are moist.      Pharynx: No oropharyngeal exudate. Eyes:      General: Lids are normal. No scleral icterus. Right eye: No discharge. Left eye: No discharge. Extraocular Movements: Extraocular movements intact. Right eye: No nystagmus. Left eye: No nystagmus. Conjunctiva/sclera: Conjunctivae normal.      Pupils: Pupils are equal, round, and reactive to light. Neck:      Thyroid: No thyromegaly. Vascular: No JVD. Trachea: No tracheal deviation. Cardiovascular:      Rate and Rhythm: Normal rate and regular rhythm. Heart sounds: Murmur heard. Systolic murmur is present with a grade of 2/6. No friction rub. No gallop.    Pulmonary: Effort: Pulmonary effort is normal. No respiratory distress. Breath sounds: Normal breath sounds. No stridor. No wheezing, rhonchi or rales. Chest:      Chest wall: No tenderness. Abdominal:      General: Bowel sounds are normal. There is no distension. Palpations: Abdomen is soft. There is no mass. Tenderness: There is no abdominal tenderness. Hernia: No hernia is present. Musculoskeletal:      Cervical back: Normal range of motion and neck supple. Right lower leg: No edema. Left lower leg: No edema. Comments: Right arm in shoulder immobilizer   Lymphadenopathy:      Cervical: No cervical adenopathy. Skin:     General: Skin is warm and dry. Coloration: Skin is not jaundiced or pale. Findings: No bruising, erythema, lesion or rash. Neurological:      General: No focal deficit present. Mental Status: She is alert and oriented to person, place, and time. Cranial Nerves: No cranial nerve deficit. Motor: No weakness or abnormal muscle tone. Coordination: Coordination normal.      Deep Tendon Reflexes: Reflexes are normal and symmetric. Reflexes normal.   Psychiatric:         Mood and Affect: Mood normal.         Behavior: Behavior is cooperative.          Vital Signs  ED Triage Vitals   Temperature Pulse Respirations Blood Pressure SpO2   10/27/23 1139 10/27/23 1139 10/27/23 1139 10/27/23 1139 10/27/23 1139   98.6 °F (37 °C) 102 17 125/71 94 %      Temp Source Heart Rate Source Patient Position - Orthostatic VS BP Location FiO2 (%)   10/27/23 1139 10/27/23 1139 10/27/23 1139 10/27/23 1139 --   Oral Monitor Sitting Left arm       Pain Score       10/27/23 1239       9           Vitals:    10/27/23 1139   BP: 125/71   Pulse: 102   Patient Position - Orthostatic VS: Sitting         Visual Acuity      ED Medications  Medications   fentanyl citrate (PF) 100 MCG/2ML 50 mcg (50 mcg Intravenous Given 10/27/23 1239)   fentanyl citrate (PF) 100 MCG/2ML 50 mcg (50 mcg Intravenous Given 10/27/23 1431)       Diagnostic Studies  Results Reviewed       Procedure Component Value Units Date/Time    Urine Microscopic [[id number]] Collected: 10/27/23 1507    Lab Status: In process Specimen: Urine, Clean Catch Updated: 10/27/23 1512    Urine Macroscopic, POC [[id number]]  (Abnormal) Collected: 10/27/23 1507    Lab Status: Final result Specimen: Urine Updated: 10/27/23 1508     Color, UA Sarah     Clarity, UA Clear     pH, UA 5.5     Leukocytes, UA Negative     Nitrite, UA Negative     Protein, UA Trace mg/dl      Glucose, UA Negative mg/dl      Ketones, UA Negative mg/dl      Urobilinogen, UA 0.2 E.U./dl      Bilirubin, UA Negative     Occult Blood, UA Trace     Specific Gravity, UA 1.015    Narrative:      CLINITEK RESULT    Procalcitonin [[id number]]  (Normal) Collected: 10/27/23 1333    Lab Status: Final result Specimen: Blood from Arm, Left Updated: 10/27/23 1410     Procalcitonin 0.14 ng/ml     Lactic acid, plasma (w/reflex if result > 2.0) [[id number]]  (Normal) Collected: 10/27/23 1333    Lab Status: Final result Specimen: Blood from Hand, Left Updated: 10/27/23 1400     LACTIC ACID 1.0 mmol/L     Narrative:      Result may be elevated if tourniquet was used during collection.     Comprehensive metabolic panel [[id number]]  (Abnormal) Collected: 10/27/23 1238    Lab Status: Final result Specimen: Blood from Hand, Left Updated: 10/27/23 1317     Sodium 134 mmol/L      Potassium 3.8 mmol/L      Chloride 102 mmol/L      CO2 26 mmol/L      ANION GAP 6 mmol/L      BUN 14 mg/dL      Creatinine 0.78 mg/dL      Glucose 139 mg/dL      Calcium 8.5 mg/dL      Corrected Calcium 9.3 mg/dL      AST 14 U/L      ALT 5 U/L      Alkaline Phosphatase 123 U/L      Total Protein 6.3 g/dL      Albumin 3.0 g/dL      Total Bilirubin 0.59 mg/dL      eGFR 80 ml/min/1.73sq m     Narrative:      [facility] (CKD):     Stage 1 with normal or high GFR (GFR > 90 mL/min/1.73 square meters)    Stage 2 Mild CKD (GFR = 60-89 mL/min/1.73 square meters)    Stage 3A Moderate CKD (GFR = 45-59 mL/min/1.73 square meters)    Stage 3B Moderate CKD (GFR = 30-44 mL/min/1.73 square meters)    Stage 4 Severe CKD (GFR = 15-29 mL/min/1.73 square meters)    Stage 5 End Stage CKD (GFR <15 mL/min/1.73 square meters)  Note: GFR calculation is accurate only with a steady state creatinine    CBC and differential [[id number]]  (Abnormal) Collected: 10/27/23 1238    Lab Status: Final result Specimen: Blood from Hand, Left Updated: 10/27/23 1255     WBC 17.62 Thousand/uL      RBC 3.67 Million/uL      Hemoglobin 11.2 g/dL      Hematocrit 33.7 %      MCV 92 fL      MCH 30.5 pg      MCHC 33.2 g/dL      RDW 14.4 %      MPV 9.4 fL      Platelets 359 Thousands/uL      nRBC 0 /100 WBCs      Neutrophils Relative 77 %      Immat GRANS % 2 %      Lymphocytes Relative 9 %      Monocytes Relative 10 %      Eosinophils Relative 1 %      Basophils Relative 1 %      Neutrophils Absolute 13.80 Thousands/µL      Immature Grans Absolute 0.34 Thousand/uL      Lymphocytes Absolute 1.60 Thousands/µL      Monocytes Absolute 1.69 Thousand/µL      Eosinophils Absolute 0.09 Thousand/µL      Basophils Absolute 0.10 Thousands/µL                    CT head without contrast   Final Result by Mayra Ho MD (10/27 1403)      No acute intracranial abnormality. Workstation performed: [id number]                    Procedures  Procedures         ED Course  ED Course as of 10/27/23 1514   Fri Oct 27, 2023   1317 WBC(!): 17.62  18.4 on 10/25/2023   1318 Sodium(!): 134   1318 Alkaline Phosphatase(!): 123   1402 LACTIC ACID: 1.0   1415 Procalcitonin: 0.14   1416 CT head:No acute intracranial abnormality. 1512 Leukocytes, UA: Negative   1512 Nitrite, UA: Negative   1512 Blood, UA(!): Trace   1512 Patient was able to ambulate without difficulty.   Stable for discharge Initial Sepsis Screening       Row Name 10/27/23 1402                Is the patient's history suggestive of a new or worsening infection? No  -[place]  (r) = Recorded By, (t) = Taken By, (c) = Cosigned By      [address] [facility] Name Provider Type    [address], DO Physician                                  Medical Decision Making  55-year-old female presents to the ED with a 1 day history of feeling off balance and "foggy". She had right shoulder replacement 4 days ago and had been taking OxyContin low-dose since the surgery. She started feeling this way last night and so stopped taking the medication but her symptoms have not improved. She has had no headaches, visual complaints, speech deficits or focal weakness. She had no fevers or chills. On exam she is in no acute distress. Her right arm is in a shoulder immobilizer. Her neuro exam including cerebellar functions are normal.  Suspect this may be medication induced but will check basic labs to rule out electrolyte abnormalities/infection. Will check urine. We will also CT head given history of feeling confused but low clinical suspicion for CVA. If labs and CT are normal, will attempt to ambulate patient    Amount and/or Complexity of Data Reviewed  Labs: ordered. Decision-making details documented in ED Course. Radiology: ordered. Risk  Prescription drug management. Disposition  Final diagnoses:   Dizziness   Medication side effect     Time reflects when diagnosis was documented in both MDM as applicable and the Disposition within this note       Time User Action Codes Description Comment    10/27/2023  3:13 PM Marcio Mcduffie Add [R42] Dizziness     10/27/2023  3:13 PM Marcio Mcduffie Add [T88. 7XXA] Medication side effect           ED Disposition       ED Disposition   Discharge    Condition   Good    Date/Time   Fri Oct 27, 2023  3:13 PM    Comment   Gutierrez Torre discharge to home/self care. Follow-up Information       Follow up With Specialties Details Why Contact Info        Follow-up with your orthopedic surgeon as instructed. Return to the ED with any worsening symptoms            Patient's Medications   Discharge Prescriptions    No medications on file       No discharge procedures on file.     PDMP Review         Value Time User    PDMP Reviewed  Yes 9/29/2023 11:36 AM Branden Doherty DO            ED Provider  Electronically Signed by             Allyssa Mosqueda DO  10/27/23 1300

## 2023-10-27 NOTE — TELEPHONE ENCOUNTER
Not sure what they have done in the past with her previous surgeries as this one isn't any different than her other shoulder surgeries. This is something they should have discussed with  prior to leaving the hospital.  She will be feeling better in few days. She only needs to protect arm in the sling. There isn't much care involved other than helping her dress and get in and out of sling.

## 2023-10-30 ENCOUNTER — PATIENT OUTREACH (OUTPATIENT)
Dept: CASE MANAGEMENT | Facility: OTHER | Age: 63
End: 2023-10-30

## 2023-10-30 ENCOUNTER — TELEPHONE (OUTPATIENT)
Age: 63
End: 2023-10-30

## 2023-10-30 DIAGNOSIS — M19.011 PRIMARY OSTEOARTHRITIS OF RIGHT SHOULDER: Primary | ICD-10-CM

## 2023-10-30 RX ORDER — HYDROCODONE BITARTRATE AND ACETAMINOPHEN 5; 325 MG/1; MG/1
1 TABLET ORAL EVERY 8 HOURS PRN
Qty: 10 TABLET | Refills: 0 | Status: SHIPPED | OUTPATIENT
Start: 2023-10-30 | End: 2023-11-04

## 2023-10-30 NOTE — TELEPHONE ENCOUNTER
Update: See previous msg. Pt went to ED on 10/27/23-please refer to notes and abnormal lab results. Thank you.

## 2023-10-30 NOTE — PROGRESS NOTES
In basket received from provider agreeing to fax home health referral to [facility]. Called patient to relay the same but she did not answer. Left VM on her machine.   Will contact [facility] tomorrow am to confirm receipt of referral.

## 2023-10-30 NOTE — TELEPHONE ENCOUNTER
Caller: Patient    Doctor: Tracey Siegel    Reason for call: Patient had Sx 10/25 and seen in ED 10/27. She states she believes she is experiencing adverse effects from oxycodone and would like to know if there is something else that can be prescribed for pain. Patient has not take oxycodone for 18+ hours, and is not feeling as "foggy", but her pain is worse. Please advise.     Call back#: [phone]

## 2023-10-30 NOTE — TELEPHONE ENCOUNTER
All narcotics will give similar response. OTC medications will likely be best for her. Ice 20 minutes on and 20 minutes off. I can try something else but it is also a narcotic (Hydrocodone). One time prescription.

## 2023-10-30 NOTE — TELEPHONE ENCOUNTER
Called and spoke w/pt and relayed AILIN Pickett's msg. Pt states she is feeling better not taking oxycodone but sometimes the pain is more severe than others. Ailyn Lindsay she went to ED on Friday and they said everything was ok except for a little blood in urine. She will keep appt on 11/6.

## 2023-10-31 ENCOUNTER — TELEPHONE (OUTPATIENT)
Age: 63
End: 2023-10-31

## 2023-10-31 ENCOUNTER — PATIENT OUTREACH (OUTPATIENT)
Dept: CASE MANAGEMENT | Facility: OTHER | Age: 63
End: 2023-10-31

## 2023-10-31 NOTE — PROGRESS NOTES
Received call from patient this am inquiring about the [facility] orders. She states contacting PT early this am to explain she was awaiting decision if she will receive home health. She and therapist cancelled her appt scheduled for 0830 this am.     Call placed to [facility] this morning. Agency staff member reports no referral received for home health orders. Per Yoan Malagon, orders visible in Epic, agency will begin processing and contact patient today. Patient will be opened within 24-48 hrs. Call back number given to agency with request to call with any questions. Called patient and informed her of the same. Patient appreciative and RN CM's call and assistance. She is agreeable to call from this RN CM tomorrow. In basket sent to [facility] clinical and clerical pools regarding home health orders.

## 2023-10-31 NOTE — PROGRESS NOTES
Call received from patient this afternoon reporting Candance Polo contacted her and a nurse will make a visit tomorrow. She reports the nurse will call tonight with the approx visit time. All questions answered. Patient has call back number of CM and was encouraged to call. She is agreeable to outreach early next week.

## 2023-10-31 NOTE — TELEPHONE ENCOUNTER
Called and spoke w/pt and she states incision is intact. Grey padding came off with 3 staples when the other dsg when removed. No redness,drainage or opening of incision. Area non adhesive dsg applied and reinforced with tape. Pt states she is feeling better. Call if the incision or any signs of infection or other issues/problems. Visiting nurses [place] Mayorga will be coming in to f/u with her.

## 2023-10-31 NOTE — TELEPHONE ENCOUNTER
Regarding: PO issue  ----- Message from Marry Tierney sent at 10/31/2023 10:11 AM EDT -----  Patient had reverse right TSA 10/24. Patient states that while getting out of the shower, she was taking off adhesive bandage that was on top of the gray pad covering the stitches and the gray pad came off. There were 3 staples stuck in the pad. There are no openings in the skin. She redressed it with a pad and medical tape. She states there is no bleeding or leaking. No signs of infection or fever.  Please advise

## 2023-11-02 ENCOUNTER — TELEPHONE (OUTPATIENT)
Dept: OBGYN CLINIC | Facility: HOSPITAL | Age: 63
End: 2023-11-02

## 2023-11-02 NOTE — TELEPHONE ENCOUNTER
Caller: Patient     Doctor: Brian Lovett    Reason for call: Hydrocodone pharmacy is telling patient needs prior authorization. She stated they did not give her a phone number for you to call.     [facility] that [facility] to in System    Call back#: [phone]

## 2023-11-03 ENCOUNTER — HOSPITAL ENCOUNTER (OUTPATIENT)
Dept: INFUSION CENTER | Facility: CLINIC | Age: 63
Discharge: HOME | End: 2023-11-03

## 2023-11-06 ENCOUNTER — HOSPITAL ENCOUNTER (OUTPATIENT)
Dept: INFUSION CENTER | Facility: CLINIC | Age: 63
Discharge: HOME | End: 2023-11-06
Payer: COMMERCIAL

## 2023-11-06 ENCOUNTER — APPOINTMENT (OUTPATIENT)
Dept: RADIOLOGY | Facility: OTHER | Age: 63
End: 2023-11-06
Payer: COMMERCIAL

## 2023-11-06 ENCOUNTER — OFFICE VISIT (OUTPATIENT)
Dept: OBGYN CLINIC | Facility: OTHER | Age: 63
End: 2023-11-06

## 2023-11-06 VITALS
BODY MASS INDEX: 23.19 KG/M2 | HEIGHT: 68 IN | HEART RATE: 105 BPM | DIASTOLIC BLOOD PRESSURE: 78 MMHG | WEIGHT: 153 LBS | SYSTOLIC BLOOD PRESSURE: 126 MMHG

## 2023-11-06 DIAGNOSIS — Z96.611 S/P REVERSE TOTAL SHOULDER ARTHROPLASTY, RIGHT: Primary | ICD-10-CM

## 2023-11-06 DIAGNOSIS — E53.8 B12 DEFICIENCY: ICD-10-CM

## 2023-11-06 DIAGNOSIS — Z96.611 AFTERCARE FOLLOWING RIGHT SHOULDER JOINT REPLACEMENT SURGERY: ICD-10-CM

## 2023-11-06 DIAGNOSIS — D50.8 IRON DEFICIENCY ANEMIA FOLLOWING BARIATRIC SURGERY: Primary | ICD-10-CM

## 2023-11-06 DIAGNOSIS — Z96.611 S/P REVERSE TOTAL SHOULDER ARTHROPLASTY, RIGHT: ICD-10-CM

## 2023-11-06 DIAGNOSIS — Z47.1 AFTERCARE FOLLOWING RIGHT SHOULDER JOINT REPLACEMENT SURGERY: ICD-10-CM

## 2023-11-06 DIAGNOSIS — K95.89 IRON DEFICIENCY ANEMIA FOLLOWING BARIATRIC SURGERY: Primary | ICD-10-CM

## 2023-11-06 PROCEDURE — 73030 X-RAY EXAM OF SHOULDER: CPT

## 2023-11-06 PROCEDURE — 99024 POSTOP FOLLOW-UP VISIT: CPT | Performed by: PHYSICIAN ASSISTANT

## 2023-11-06 PROCEDURE — 96372 THER/PROPH/DIAG INJ SC/IM: CPT

## 2023-11-06 RX ORDER — CYANOCOBALAMIN 1000 UG/ML
1000 INJECTION, SOLUTION INTRAMUSCULAR; SUBCUTANEOUS ONCE
OUTPATIENT
Start: 2023-12-01

## 2023-11-06 RX ORDER — CYANOCOBALAMIN 1000 UG/ML
1000 INJECTION, SOLUTION INTRAMUSCULAR; SUBCUTANEOUS ONCE
Status: COMPLETED | OUTPATIENT
Start: 2023-11-06 | End: 2023-11-06

## 2023-11-06 RX ADMIN — CYANOCOBALAMIN 1000 MCG: 1000 INJECTION INTRAMUSCULAR; SUBCUTANEOUS at 10:52

## 2023-11-06 NOTE — PROGRESS NOTES
Assessment:       1. S/P reverse total shoulder arthroplasty, right    2. Aftercare following right shoulder joint replacement surgery          Plan:        Patient is doing well postoperatively. She has not had PT to date and is scheduled to start home PT today. Operative note, images, and reverse total shoulder arthroplasty rehab protocol were discussed. All questions were addressed to the patient's satisfaction. Patient was advised to take prophylactic antibiotics prior to dental procedures. Follow-up is in 2 months to assess patient's progress. Subjective:     Patient ID: Hanane Stanton is a 57 y.o. female. Chief Complaint:    HPI    Patient presents for follow-up status post right reverse total shoulder arthroplasty on 10/24/2023. Pain is controlled. Patient has no residual paresthesia following anesthetic block. Social History     Occupational History    Not on file   Tobacco Use    Smoking status: Former     Packs/day: 0.20     Years: 20.00     Total pack years: 4.00     Types: Cigarettes     Start date: 0     Quit date: 2008     Years since quitting: 15.8    Smokeless tobacco: Never    Tobacco comments:     quit   Vaping Use    Vaping Use: Never used   Substance and Sexual Activity    Alcohol use: Not Currently     Alcohol/week: 2.0 standard drinks of alcohol     Types: 1 Glasses of wine, 1 Standard drinks or equivalent per week     Comment: occasionally    Drug use: Never     Comment: na    Sexual activity: Not on file     Comment: defer      Review of Systems   Constitutional: Negative. Respiratory: Negative. Musculoskeletal:  Positive for myalgias. Negative for arthralgias. Skin:  Positive for wound. Neurological:  Positive for weakness. Negative for numbness. Psychiatric/Behavioral: Negative. Objective:     Ortho ExamPhysical Exam  HENT:      Head: Atraumatic. Cardiovascular:      Pulses: Normal pulses.    Pulmonary:      Effort: Pulmonary effort is normal.   Musculoskeletal:      Comments: Painless circumduction right shoulder. Skin:     General: Skin is warm and dry. Capillary Refill: Capillary refill takes less than 2 seconds. Comments: Incision dry and clean. Staples/sutures removed, steristrips applied. Neurological:      Mental Status: She is alert and oriented to person, place, and time. Sensory: No sensory deficit. Psychiatric:         Mood and Affect: Mood normal.         Behavior: Behavior normal.           I have personally reviewed pertinent films in PACS and my interpretation is consistent with reverse total shoulder arthroplasty, right. Prosthesis in excellent position. Glenohumeral joint preserved.

## 2023-11-06 NOTE — PATIENT INSTRUCTIONS
1. Sling as per protocol  2. PT per protocol  3. Follow-up in 2 months    Reverse Total Shoulder Rehabilitation Protocol  Cody Murguia MD  [facility]. [facility]      Notes for Physical Therapist:  Normal shoulder arthrokinematics are no longer relevant. Aggressive PROM and strengthening can lead to complications - please avoid combined movements of IR/ext and ER/abd. Advise patients to not carry heavy objects at side - i.e. grocery bags. Special care with patients with known Osteoporosis and/or thinned acromion. Review operative report to understand RTC involvement and other variants which may require attention. Sling - D/C'd at 2 or 4 weeks. This will be dependent on surgical findings and will be specified in patient's operative report. Please direct protocol questions to one of the authors and patient issues to surgeon. Phase I - Protective phase: Day 1-2 weeks:  Patient/family education on surgery and rehab/functional expectations. PROM: flexion to first end-feel and ER to <30 degrees. Instruct in DELTOID isometrics and ensure completion at home throughout rehab process. Postural correction exercises. Phase II - Sub-Acute Phase:  2-6 weeks:  Initiate AAROM and progress to AROM to promote shoulder elevation/scaption avoiding scapular compensation. PROM to the following permanent limits:  flexion: 120 and ER: to first end feel and not to exceed 50 degrees. Functional IR: not past greater trochanter. Phase III - Return to Function:  6- 8 weeks:  Continue to progress AROM and functional activities to allow patient to perform desired ADL activities. 3# weight limit with ADL activities. Consider light CKC activities appreciating PROM limitations. Discharge Criteria:  The goal is to discharge the patient by 8 weeks post-op with functional shoulder elevation/scaption.   If the therapist feels that additional PT is needed based upon complications or patient's goals, this should be discussed with Dr. Alexandre Ledesma.

## 2023-11-07 ENCOUNTER — PATIENT OUTREACH (OUTPATIENT)
Dept: CASE MANAGEMENT | Facility: OTHER | Age: 63
End: 2023-11-07

## 2023-11-07 NOTE — PROGRESS NOTES
Review of chart completed. Patient attended ortho appt yesterday. Provider notes satisfactory post op progress and patient expected to initiate in home PT this same day. Patient was advised to take prophylactic antibiotics prior to dental procedures and TRO in 2 months to assess patient's progress. Unsuccessful attempt at reaching patient this afternoon for care management call and follow up on VNA services. Left brief message with request to return call. MyChart message sent through patient portal regarding the same. Upcoming Nov appts also included. Will remain available to assist and continue to follow in surveillance at this time.

## 2023-11-08 ENCOUNTER — PATIENT OUTREACH (OUTPATIENT)
Dept: CASE MANAGEMENT | Facility: OTHER | Age: 63
End: 2023-11-08

## 2023-11-08 NOTE — PROGRESS NOTES
In basket reply from patient received reporting [facility] has initiated OT and PT this week and will be coming once weekly for 4 weeks. Her message expressed appreciativeness of this RN CM's assistance. Reply sent to Meche Martinez encouraging her to outreach for any additional needs. Patient has call back number of this writer. Will continue to follow in surveillance.

## 2023-11-13 ENCOUNTER — TELEPHONE (OUTPATIENT)
Age: 63
End: 2023-11-13

## 2023-11-13 NOTE — TELEPHONE ENCOUNTER
Caller: Home [facility]    Doctor: Temo Chicas    Reason for call: [address] calling regarding information required to assist patient. Call is regarding a series of orders that require attention.     Call back#: [phone]

## 2023-11-17 ENCOUNTER — OFFICE VISIT (OUTPATIENT)
Dept: FAMILY MEDICINE CLINIC | Facility: CLINIC | Age: 63
End: 2023-11-17
Payer: COMMERCIAL

## 2023-11-17 VITALS
HEIGHT: 68 IN | BODY MASS INDEX: 37.59 KG/M2 | DIASTOLIC BLOOD PRESSURE: 80 MMHG | SYSTOLIC BLOOD PRESSURE: 120 MMHG | WEIGHT: 248 LBS | HEART RATE: 96 BPM | TEMPERATURE: 97 F | OXYGEN SATURATION: 98 %

## 2023-11-17 DIAGNOSIS — R73.01 IFG (IMPAIRED FASTING GLUCOSE): ICD-10-CM

## 2023-11-17 DIAGNOSIS — I10 PRIMARY HYPERTENSION: ICD-10-CM

## 2023-11-17 DIAGNOSIS — E27.1 ADRENAL INSUFFICIENCY (ADDISON'S DISEASE) (HCC): ICD-10-CM

## 2023-11-17 DIAGNOSIS — E78.49 OTHER HYPERLIPIDEMIA: ICD-10-CM

## 2023-11-17 DIAGNOSIS — B37.2 INTERTRIGINOUS CANDIDIASIS: ICD-10-CM

## 2023-11-17 DIAGNOSIS — E05.90 SUBCLINICAL HYPERTHYROIDISM: Primary | ICD-10-CM

## 2023-11-17 PROCEDURE — 99214 OFFICE O/P EST MOD 30 MIN: CPT | Performed by: FAMILY MEDICINE

## 2023-11-17 RX ORDER — NYSTATIN 100000 U/G
CREAM TOPICAL 2 TIMES DAILY
Qty: 30 G | Refills: 0 | Status: SHIPPED | OUTPATIENT
Start: 2023-11-17

## 2023-11-17 RX ORDER — LORAZEPAM 0.5 MG/1
0.5 TABLET ORAL DAILY PRN
COMMUNITY
Start: 2023-11-13

## 2023-11-20 ENCOUNTER — TELEPHONE (OUTPATIENT)
Dept: ENDOCRINOLOGY | Facility: CLINIC | Age: 63
End: 2023-11-20

## 2023-11-20 NOTE — PROGRESS NOTES
Assessment/Plan:    58 y/o woman with: hyperthyroidism, [place]'s disease, HTN, IFG, HLD, intertriginous candidiasis. Will continue meds. Will check labs. Will refer to endocrinology for further management. Discussed supportive care and return parameters. No problem-specific Assessment & Plan notes found for this encounter. Diagnoses and all orders for this visit:    Subclinical hyperthyroidism  -     CBC and differential; Future  -     Comprehensive metabolic panel; Future  -     TSH, 3rd generation with Free T4 reflex; Future  -     C-reactive protein; Future  -     ACTH; Future  -     Cortisol Level,7-9 AM Specimen; Future  -     Ambulatory Referral to Endocrinology; Future    Adrenal insufficiency (Andrés's disease) (HCC)  -     CBC and differential; Future  -     Comprehensive metabolic panel; Future  -     TSH, 3rd generation with Free T4 reflex; Future  -     C-reactive protein; Future  -     ACTH; Future  -     Cortisol Level,7-9 AM Specimen; Future  -     Ambulatory Referral to Endocrinology; Future    Primary hypertension  -     CBC and differential; Future  -     Comprehensive metabolic panel; Future  -     TSH, 3rd generation with Free T4 reflex; Future  -     C-reactive protein; Future  -     ACTH; Future  -     Cortisol Level,7-9 AM Specimen; Future  -     Ambulatory Referral to Endocrinology; Future    IFG (impaired fasting glucose)  -     CBC and differential; Future  -     Comprehensive metabolic panel; Future  -     TSH, 3rd generation with Free T4 reflex; Future  -     C-reactive protein; Future  -     ACTH; Future  -     Cortisol Level,7-9 AM Specimen; Future  -     Ambulatory Referral to Endocrinology; Future    Other hyperlipidemia  -     CBC and differential; Future  -     Comprehensive metabolic panel; Future  -     TSH, 3rd generation with Free T4 reflex; Future  -     C-reactive protein; Future  -     ACTH; Future  -     Cortisol Level,7-9 AM Specimen;  Future  -     Ambulatory Referral to Endocrinology; Future    Intertriginous candidiasis  -     nystatin (MYCOSTATIN) cream; Apply topically 2 (two) times a day    Other orders  -     LORazepam (ATIVAN) 0.5 mg tablet; Take 0.5 mg by mouth daily as needed          Subjective:     Chief Complaint   Patient presents with    Follow-up     Med management , extreme hot flashes , rash under belly         Patient ID: Melo Small is a 57 y.o. female. Patient is a 58 y/o woman who presents for follow-up on hyperthyroidism, [place]'s disease, HTN, IFG, HLD, intertriginous candidiasis. Pt admits being stable on meds. And denies acute complaints otherwise no fevers chills nausea or vomiting. The following portions of the patient's history were reviewed and updated as appropriate: allergies, current medications, past family history, past medical history, past social history, past surgical history and problem list.    Review of Systems   Constitutional: Negative. HENT: Negative. Eyes: Negative. Respiratory: Negative. Cardiovascular: Negative. Gastrointestinal: Negative. Endocrine: Negative. Genitourinary: Negative. Musculoskeletal: Negative. Allergic/Immunologic: Negative. Neurological: Negative. Hematological: Negative. Psychiatric/Behavioral: Negative. All other systems reviewed and are negative. Objective:      /80   Pulse 96   Temp (!) 97 °F (36.1 °C)   Ht 5' 8" (1.727 m)   Wt 112 kg (248 lb)   SpO2 98%   BMI 37.71 kg/m²          Physical Exam  Constitutional:       Appearance: She is well-developed. HENT:      Head: Atraumatic. Right Ear: External ear normal.      Left Ear: External ear normal.   Eyes:      Conjunctiva/sclera: Conjunctivae normal.      Pupils: Pupils are equal, round, and reactive to light. Cardiovascular:      Rate and Rhythm: Normal rate and regular rhythm. Heart sounds: Normal heart sounds.    Pulmonary:      Effort: Pulmonary effort is normal. No respiratory distress. Breath sounds: Normal breath sounds. Abdominal:      General: There is no distension. Palpations: Abdomen is soft. Tenderness: There is no abdominal tenderness. There is no guarding or rebound. Musculoskeletal:         General: Normal range of motion. Cervical back: Normal range of motion. Skin:     General: Skin is warm and dry. Neurological:      Mental Status: She is alert and oriented to person, place, and time. Cranial Nerves: No cranial nerve deficit. Psychiatric:         Behavior: Behavior normal.         Thought Content: Thought content normal.         Judgment: Judgment normal.         BMI Counseling: Body mass index is 37.71 kg/m². The BMI is above normal. Nutrition recommendations include encouraging healthy choices of fruits and vegetables. Exercise recommendations include moderate physical activity 150 minutes/week. Rationale for BMI follow-up plan is due to patient being overweight or obese.

## 2023-11-20 NOTE — TELEPHONE ENCOUNTER
Received referral for Jaspreet Boyce. Left voicemail for patient to contact office to schedule Consult/New Patient Appointment.

## 2023-11-22 ENCOUNTER — DOCUMENTATION (OUTPATIENT)
Dept: CASE MANAGEMENT | Facility: OTHER | Age: 63
End: 2023-11-22

## 2023-11-22 NOTE — MEDICAL HIGH UTILIZER
Andrade for: Bard Flores  Patient Name: Bard Flores          MRN: [id number]       : 1960 Age: 57      Sex:  F   Utilization Background: She is a female with a history of migraine, Andrés's disease, Bipolar, Depression, Fibromyalgia, and HTN who presents to [facility] with migraine. She has 14 ER visits, 9 admission, and 2 transfers to [facility] for Ketamine Infusions in 2019. Discussed with Neurology reveals that patient does not feel much better even after prolonged hospitalization. In the last 90 days: 2 ED visits, 1 Planned Admission    Treatment Recommendations:  ED Recommendations: Recommendations as per neurology: Give migraine protocol: using steroid protocol d/t toradol allergy. Recommend calling her [facility] Neurologist to schedule close outpatient follow up. It is noted that the patient will provide other complaints to the ER providers to get admitted and then will complain of migraine in the hospital.     Would not offer transfer for Ketamine Infusion to this patient as it has not worked in the past. This should be left up to Neurology to determine if this is appropriate. Inpatient Recommendations: Early consultation with neurology. Avoid narcotics/sedating agents due to over sedation in the past       Outpatient recommendations: Needs close follow up with [facility]. Needs CM to make sure that patient does not run out of her meds as she has in the past.      Situation: Patient who presents frequently for migraines. It seems that she has started using other chief complaints to get admitted to the hospital for migraine treatment.  As per neurology colleague her headache symptomatology does not usually change with treatment      PMH/PSH:  Migraine, Depression, Bipolar, Fibromyalgia, HTN, Hx of DVT      Assessment                              Drivers of repeated utilization:                 Symptomatology     Community Resources in place:    None - she has mentioned that she does not have a  for infusions  May need assistance with transportation and with medications   Patient Care Team:   Dilip Lantigua MD - PCP [facility])  Leodan Cazares CRNP/Luigi Nolan MD - Neurology [facility])  Ian Thompson MD - Hematology [facility])  [address]: Sterling Gomez RN              Care plan date and owner: Lee Ann Eaton. Herlinda Osullivan. SIRISHA 10/31/2019         Reviewed with patient before discharge?

## 2023-11-24 ENCOUNTER — TELEPHONE (OUTPATIENT)
Age: 63
End: 2023-11-24

## 2023-11-24 NOTE — TELEPHONE ENCOUNTER
[facility]er: [facility]    Doctor: Prateek Byrd     Reason for call: Just wanted to update you on patient status. Had to reschedule today's appt due to patient not feeling well, but patient has been compliant with therapy.  They will be scaling back on physical therapy and concentrating on OT    Call back#:

## 2023-11-28 ENCOUNTER — EVALUATION (OUTPATIENT)
Dept: PHYSICAL THERAPY | Facility: CLINIC | Age: 63
End: 2023-11-28
Payer: COMMERCIAL

## 2023-11-28 DIAGNOSIS — Z96.611 S/P REVERSE TOTAL SHOULDER ARTHROPLASTY, RIGHT: Primary | ICD-10-CM

## 2023-11-28 PROCEDURE — 97162 PT EVAL MOD COMPLEX 30 MIN: CPT

## 2023-11-28 PROCEDURE — 97110 THERAPEUTIC EXERCISES: CPT

## 2023-11-28 NOTE — PROGRESS NOTES
PT Evaluation     Today's date: 2023  Patient name: Melo Small  : 1960  MRN: [id number]  Referring provider: Nancy Tamez  Dx:   Encounter Diagnosis     ICD-10-CM    1. S/P reverse total shoulder arthroplasty, right  Z96.611           Start Time: 1500  Stop Time: 1555  Total time in clinic (min): 55 minutes    Assessment  Assessment details: Pt is a 57y.o. year old female presenting to physical therapy for S/P reverse total shoulder arthroplasty, right  (primary encounter diagnosis). She presents with the following impairments: decreased R shoulder strength, decreased R shoulder ROM, decreased scapular mobility, and pain and TTP of R biceps affecting her function with ADLs, putting on a coat, overhead reaching, lifting objects, carrying groceries, and functional ability. Pt will benefit from skilled physical therapy to address functional limitations noted in evaluation and meet patient goals. Impairments: abnormal muscle firing, abnormal or restricted ROM, activity intolerance, impaired physical strength, lacks appropriate home exercise program, pain with function and poor body mechanics    Symptom irritability: moderateBarriers to therapy: S/p R reverse TSA on 10/24. Understanding of Dx/Px/POC: good   Prognosis: good    Goals  ST. Pt will be independent with HEP. 2. Pt will improve R shoulder abduction ROM by 15+ degrees. 3. Pt will improve R shoulder strength grossly to 3/5  LT. Pt will have full, pain free overhead reaching ability WFL compared to LUE. 2. Pt will improve R shoulder strength grossly by 2 grades or more to improve functional ability. 3. Pt will improve scapular mechanics to WNL compared to LUE.      Plan  Patient would benefit from: PT eval and skilled physical therapy  Planned modality interventions: biofeedback, manual electrical stimulation, microcurrent electrical stimulation, TENS, electrical stimulation/Russian stimulation, thermotherapy: hydrocollator packs, cryotherapy and unattended electrical stimulation  Planned therapy interventions: abdominal trunk stabilization, joint mobilization, manual therapy, massage, ADL retraining, neuromuscular re-education, body mechanics training, patient education, postural training, strengthening, stretching, therapeutic activities, therapeutic exercise, flexibility, functional ROM exercises and home exercise program  Frequency: 2x week  Duration in weeks: 8  Treatment plan discussed with: patient        Subjective Evaluation    History of Present Illness  Mechanism of injury: Pt presents to the clinic s/p R TSA on 10/24 and is 5 weeks. Pt stated that she had two therapy sessions in the home that went well but has not had any other formal therapy since her pre-op visit. Pt stated that she has been trying to actively use it a little bit to do tasks without any weight in the hand but has been being cautious with active movement because she did not know how much she was allowed to use it. Pt stated that her sling was discharged at two weeks after her shoulder. Pt stated that she does not have any pain at the joint, but does have some discomfort in her R bicep when trying exercises at home.    Patient Goals  Patient goals for therapy: decreased pain, improved balance, increased motion, increased strength, independence with ADLs/IADLs and return to sport/leisure activities    Pain  Current pain ratin  At best pain ratin  At worst pain ratin  Quality: discomfort, dull ache and pressure          Objective     Active Range of Motion   Left Shoulder   Flexion: 165 degrees   Abduction: 110 degrees     Right Shoulder   Flexion: 130 degrees   Abduction: 80 degrees   External rotation 45°: 35 degrees     Passive Range of Motion   Left Shoulder   Flexion: 170 degrees   Abduction: 170 degrees   External rotation 45°: 70 degrees     Right Shoulder   External rotation 45°: 40 degrees     Scapular Mobility   Left Shoulder   Scapular mobility: fair    Right Shoulder   Scapular mobility: poor    Strength/Myotome Testing     Left Shoulder     Planes of Motion   Flexion: 3+   Abduction: 4-   External rotation at 0°: 3+   Internal rotation at 0°: 4     Right Shoulder     Planes of Motion   Flexion: 3-   Abduction: 3-   External rotation at 0°: 3     Left Elbow   Normal strength    Right Elbow   Normal strength    Left Wrist/Hand   Normal wrist strength    Right Wrist/Hand   Normal wrist strength    General Comments:      Shoulder Comments   Pt had difficulty with shoulder elevation AROM in standing compared to when assessed for measurement in supine, with pain noted in R biceps with performance of all shoulder motions. Precautions: S/p reverse R TSA 10/24. 5th week as of 11/28 (sub acute phase). As per MD: "Initiate AAROM and progress to AROM to promote shoulder elevation/scaption avoiding scapular compensation. PROM to the following permanent limits:  flexion: 120 and ER: to first end feel and not to exceed 50 degrees.   Functional IR: not past greater trochanter."    Date 11/28            Visit # IE            FOTO IE             Re-eval IE              Manuals 11/28            R shoulder PROM                                                    Neuro Re-Ed 11/28            Scap retracts             Deltoid isometrics Ant, lateral, post against wall            ER band walkout                                                                  Ther Ex 11/28            Pulleys             Table slides             Wall slides HEP*            Sitting abd AAROM baton 10x5"            Sup flex AAROM baton 10x5"            Shoulder raises AROM             Finger ladder                          Ther Activity 11/28            Cones hold                         Gait Training 11/28                                      Modalities 11/28

## 2023-11-30 ENCOUNTER — OFFICE VISIT (OUTPATIENT)
Dept: PHYSICAL THERAPY | Facility: CLINIC | Age: 63
End: 2023-11-30
Payer: COMMERCIAL

## 2023-11-30 DIAGNOSIS — Z96.611 S/P REVERSE TOTAL SHOULDER ARTHROPLASTY, RIGHT: Primary | ICD-10-CM

## 2023-11-30 PROCEDURE — 97140 MANUAL THERAPY 1/> REGIONS: CPT

## 2023-11-30 PROCEDURE — 97110 THERAPEUTIC EXERCISES: CPT

## 2023-11-30 NOTE — PROGRESS NOTES
Daily Note     Today's date: 2023  Patient name: Hanane Stanton  : 1960  MRN: [id number]  Referring provider: Vitor Pickett  Dx:   Encounter Diagnosis     ICD-10-CM    1. S/P reverse total shoulder arthroplasty, right  Z96.611           Start Time: 930  Stop Time: 1005  Total time in clinic (min): 35 minutes    Subjective: Pt presents to PT stating she thinks she over did it this week. Pt reports she was doing her HEP and while doing her cane exercises she felt some increased pain and symptoms. Pt reports she is still sore today. Objective: See treatment diary below      Assessment: Tolerated treatment well. Patient demonstrated fatigue post treatment and would benefit from continued PT. Pt performed exercises as noted with no exacerbations of symptoms. Pt needed minimal VCing for range and intensity of exercises. Pt performed exercises as tolerable, and educated on performing HEP in a pain-free range. Continue to progress as able. Plan: Continue per plan of care. Precautions: S/p reverse R TSA 10/24. 5th week as of  (sub acute phase). As per MD: "Initiate AAROM and progress to AROM to promote shoulder elevation/scaption avoiding scapular compensation. PROM to the following permanent limits:  flexion: 120 and ER: to first end feel and not to exceed 50 degrees.   Functional IR: not past greater trochanter."    Date            Visit # IE 2           FOTO IE             Re-eval IE              Manuals            R shoulder PROM  HY                                                  Neuro Re-Ed            Scap retracts  2x10           Deltoid isometrics Ant, lateral, post against wall np           ER band walkout   np                                                               Ther Ex            Pulleys  5'           Table slides  np           Wall slides HEP*            Sitting abd AAROM baton 10x5" np           Sup flex WILBUR smith 10x5" np           Shoulder raises AROM             Finger ladder  10x5"                        Ther Activity 11/28 11/30           Cones hold                         Gait Training 11/28 11/30                                     Modalities 11/28 11/30

## 2023-12-01 ENCOUNTER — TELEPHONE (OUTPATIENT)
Age: 63
End: 2023-12-01

## 2023-12-01 NOTE — TELEPHONE ENCOUNTER
Called and spoke with pt who is PO 10/24/23 a right shoulder total reverse arthroplasty and was seen last on 11/6/23 for a post op visit. Pt was exercising and pt was trying to push right arm up and pt was pushing passed the pain. Pt states her arm aches all day long. Pt was doing the exercises at home. No increase in swelling, bruising, redness or numbness and tingling. Pt has tried heat, ice, bio freeze and bio freeze pads and a muscle stimulator. Pt has been taking tylenol for it as well. Pt is able to take ibuprofen but has not taken anything. Pt took a muscle relaxer without relief. Pt rates pain a 10/10, achy pain and no relief all day. I did advise the pt to take advil and alternate that with ES tylenol and to follow label recommendations. Also stressed to rest the arm. Please advise further thank you!

## 2023-12-01 NOTE — TELEPHONE ENCOUNTER
Tylenol 1000 mg every 8 hours  Ice - 20 minutes on and 20 minutes off  She is over a month out from surgery. She is out of her sling. Usually patients having pain at this point are doing way too much. She should not be using the arm for more than range of motion exercises. She shouldn't be using it to lift, push or pull anything. Recommend returning to sling over the weekend to improve symptoms. We can see Monday to evaluate.   If pain gets to bad, she may need to go to ER for evaluation and xray

## 2023-12-01 NOTE — TELEPHONE ENCOUNTER
Called and spoke with pt and relayed MONIKA Pickett msg, pt understands and will take it easy anddo ROM exercises. Pt states she will wait to make an appt and will go to ER over the weekend for worsening pain. No further questions.

## 2023-12-01 NOTE — TELEPHONE ENCOUNTER
Caller: Patient    Doctor: Tracey Siegel    Reason for call:     Patient is calling about her right arm, she has been in extreme pain for 3 days (10)  She does not know what to do to stop the pain. She is asking if the doctor can advise her   Of any remedies to help her.     Call back#: n/a

## 2023-12-07 ENCOUNTER — APPOINTMENT (OUTPATIENT)
Dept: PHYSICAL THERAPY | Facility: CLINIC | Age: 63
End: 2023-12-07
Payer: COMMERCIAL

## 2023-12-12 ENCOUNTER — OFFICE VISIT (OUTPATIENT)
Dept: PHYSICAL THERAPY | Facility: CLINIC | Age: 63
End: 2023-12-12
Payer: COMMERCIAL

## 2023-12-12 DIAGNOSIS — Z96.611 S/P REVERSE TOTAL SHOULDER ARTHROPLASTY, RIGHT: Primary | ICD-10-CM

## 2023-12-12 PROCEDURE — 97112 NEUROMUSCULAR REEDUCATION: CPT

## 2023-12-12 PROCEDURE — 97110 THERAPEUTIC EXERCISES: CPT

## 2023-12-12 PROCEDURE — 97140 MANUAL THERAPY 1/> REGIONS: CPT

## 2023-12-12 NOTE — PROGRESS NOTES
Daily Note     Today's date: 2023  Patient name: Abby Contreras  : 1960  MRN: [id number]  Referring provider: Stalin Noland  Dx:   Encounter Diagnosis     ICD-10-CM    1. S/P reverse total shoulder arthroplasty, right  Z96.611           Start Time: 1100  Stop Time: 1135  Total time in clinic (min): 35 minutes    Subjective: Pt stated that her R shoulder has been feeling sore over the past two weeks, she contacted her doctor and they instructed her to use her sling as needed to help with her pain, which she has been doing but stated very minimal relief. Pt stated that she knows that she should not be moving it much actively at home but reported that she has been using it and trying things out at home which has been difficult for her. Pt stated that she has also been sick but is feeling a little better today. Objective: See treatment diary below      Assessment: Pt tolerated warm up on pulleys well at beginning of session without increase in discomfort during or after activity, but required mild verbal cueing to perform with proper form and decreased speed as pt was rushing through activity. Pt had difficulty with abduction AAROM with baton to around 45 degrees with tightness and mild discomfort noted. Pt showed good ROM with finger ladder shoulder flexion activity with mild discomfort at end of set, pt had discomfort with performance of abduction finger ladder activity today, held after one repetition. Pt required mild verbal cueing to perform deltoid isometric activities with proper form, pt adapted to instruction well. Pt was re educated that at this time she is only to be using the RUE for ROM activities still and not to be actively using it for everyday tasks at home yet, pt understood education. Pt tolerated manual R shoulder PROM with reported decrease in stiffness post manual treatment.  Pt would benefit from continued skilled PT to improve R shoulder mobility, ROM, flexibility, strength, and functional ability. Plan: Continue per plan of care. Progress treatment as tolerated. Precautions: S/p reverse R TSA 10/24. 5th week as of 11/28 (sub acute phase). As per MD: "Initiate AAROM and progress to AROM to promote shoulder elevation/scaption avoiding scapular compensation. PROM to the following permanent limits:  flexion: 120 and ER: to first end feel and not to exceed 50 degrees.   Functional IR: not past greater trochanter."    Date 11/28 11/30 12/12          Visit # IE 2 3          FOTO IE             Re-eval IE              Manuals 11/28 11/30 12/12          R shoulder PROM  HY DK                                                 Neuro Re-Ed 11/28 11/30 12/12          Scap retracts  2x10           Deltoid isometrics Ant, lateral, post against wall np 10x ant, post, abd, ER          ER band walkout   np           pendulums   1' fwd, 1' ea clock/c-clock          Pt Edu   DK                                    Ther Ex 11/28 11/30 12/12          Pulleys  5' 5'          Table slides  np           Wall slides HEP*  10x5"          Sitting abd AAROM baton 10x5" np 10x5"          Sup flex AAROM baton 10x5" np 10x10"          Shoulder raises AROM   hold          Finger ladder  10x5" 10x5" flex, abd                       Ther Activity 11/28 11/30 12/12          Cones hold                         Gait Training 11/28 11/30 12/12                                    Modalities 11/28 11/30

## 2023-12-14 ENCOUNTER — APPOINTMENT (OUTPATIENT)
Dept: PHYSICAL THERAPY | Facility: CLINIC | Age: 63
End: 2023-12-14
Payer: COMMERCIAL

## 2023-12-14 ENCOUNTER — CONSULT (OUTPATIENT)
Dept: PAIN MEDICINE | Facility: MEDICAL CENTER | Age: 63
End: 2023-12-14
Payer: COMMERCIAL

## 2023-12-14 VITALS
SYSTOLIC BLOOD PRESSURE: 195 MMHG | OXYGEN SATURATION: 98 % | HEIGHT: 68 IN | WEIGHT: 248 LBS | BODY MASS INDEX: 37.59 KG/M2 | HEART RATE: 105 BPM | DIASTOLIC BLOOD PRESSURE: 73 MMHG

## 2023-12-14 DIAGNOSIS — M47.816 LUMBAR SPONDYLOSIS: Primary | ICD-10-CM

## 2023-12-14 DIAGNOSIS — M54.50 CHRONIC BILATERAL LOW BACK PAIN WITHOUT SCIATICA: ICD-10-CM

## 2023-12-14 DIAGNOSIS — G89.29 CHRONIC BILATERAL LOW BACK PAIN WITHOUT SCIATICA: ICD-10-CM

## 2023-12-14 PROCEDURE — 99204 OFFICE O/P NEW MOD 45 MIN: CPT | Performed by: PHYSICAL MEDICINE & REHABILITATION

## 2023-12-14 NOTE — PROGRESS NOTES
Assessment  1. Lumbar spondylosis    2. Chronic bilateral low back pain without sciatica        Plan  The patient has been experiencing moderate to severe axial spine pain that is causing functional deficit. The pain has been present for at least 3 months and is not improving with conservative care including one or more of the following: home exercise regimen, physician directed home exercise program, physical therapy, or chiropractic care. Currently the patient is not experiencing any radicular features nor neurogenic claudication. Non-facet pathology has been ruled out on clinical evaluation. We will schedule the patient for bilateral L3-5 medial branch nerve blocks with intention of moving forward towards radiofrequency ablation if there is an appropriate diagnostic response. The initial blocks will be performed with 2% lidocaine and if an appropriate response is obtained upon review of the patient's pain diary, a repeat radiofrequency ablation procedure will be performed. In the office today, we reviewed the nature of facet joint pathology in depth using a spine model. We discussed the approach we would use for the injections and provided literature for home review. The patient understands the risks associated with the procedure including bleeding, infection, tissue injury, and allergic reaction and provided verbal informed consent in the office today. My impressions and treatment recommendations were discussed in detail with the patient who verbalized understanding and had no further questions. Discharge instructions were provided. I personally saw and examined the patient and I agree with the above discussed plan of care.     Orders Placed This Encounter   Procedures    FL spine and pain procedure     Standing Status:   Future     Standing Expiration Date:   12/14/2024     Order Specific Question:   Reason for Exam:     Answer:   (B) L3-5 MBB#1 (only one block needed, prior successful RFA [facility]) [facility]  Cardiology  Progress Note    Patient Name: Reid Herman  MRN: [id number]  Admission Date: 5/29/2019  Hospital Length of Stay: 10 days  Code Status: Full Code   Attending Physician: Paz Pradhan MD   Primary Care Physician: Olivia Zavala MD  Expected Discharge Date: 6/11/2019  Principal Problem:Cellulitis of foot    Subjective:     Hospital Course:   06/07- NAEON, plan to extubate today.     Interval History: no acute events overnight. Will extubate today.    Review of Systems   Unable to perform ROS: intubated     Objective:     Vital Signs (Most Recent):  Temp: (!) 91.2 °F (32.9 °C) (06/08/19 1800)  Pulse: 66 (06/08/19 1800)  Resp: (!) 26 (06/08/19 1800)  BP: (!) 188/88 (06/08/19 1316)  SpO2: 100 % (06/08/19 1800) Vital Signs (24h Range):  Temp:  [91.2 °F (32.9 °C)-99.7 °F (37.6 °C)] 91.2 °F (32.9 °C)  Pulse:  [0-138] 66  Resp:  [] 26  SpO2:  [72 %-100 %] 100 %  BP: ()/(37-88) 188/88     Weight: 92.2 kg (203 lb 4.2 oz)  Body mass index is 31.84 kg/m².     SpO2: 100 %  O2 Device (Oxygen Therapy): ventilator      Intake/Output Summary (Last 24 hours) at 6/8/2019 1811  Last data filed at 6/8/2019 1800  Gross per 24 hour   Intake 1803.4 ml   Output 4045 ml   Net -2241.6 ml       Lines/Drains/Airways     Central Venous Catheter Line                 Percutaneous Central Line Insertion/Assessment - triple lumen  06/06/19 1949 right internal jugular 1 day          Drain                 Sheath 06/06/19 1412 Right proximal 2 days         NG/OG Tube 06/08/19 1445 [place] sump 14 Fr. Center mouth less than 1 day         Urethral Catheter 06/08/19 1525 14 Fr. less than 1 day          Airway                 Airway - Non-Surgical 06/08/19 1254 Endotracheal Tube less than 1 day          Line                 IABP 06/06/19 1728 2 days         Pacer Wires 06/06/19 1728 2 days          Peripheral Intravenous Line                 Peripheral IV - Single Lumen 06/05/19 2005 20 G  Order Specific Question:   Anticoagulant hold needed? Answer:   no     No orders of the defined types were placed in this encounter. History of Present Illness    Divina Garner is a 70 y.o. female in consultation at the request of Dr. Teetee Odell regarding chronic lower back pain. The patient has been experiencing back pain chronically and has undergone successful lumbar radiofrequency ablation multiple times at [facility] with Dr. Phani Gomes. She has elected to transfer her care to [facility] and I will be happy to assist her with further interventional options. She is describing pain that is moderate to severe in intensity currently rated a 9/10 at its worst which is nearly constant without any typical pattern described as burning shooting sharp and throbbing. She is localizing this across her axial lumbar spine without radiation down the legs at all. No weakness and no assistive devices are necessary for ambulation. Aggravating factors include lying down standing bending walking relaxation and bowel movements. Alleviating factors are unknown. Her last radiofrequency ablation was performed in January of this year and provided 90% relief for greater than 6 months. She is also noted moderate relief in the past with heat or ice application and TENS unit. Social history negative for tobacco and marijuana use and negative for alcohol use. She is on a blood thinner. I have personally reviewed and/or updated the patient's past medical history, past surgical history, family history, social history, current medications, allergies, and vital signs today. Review of Systems   Constitutional:  Negative for fever and unexpected weight change. HENT:  Negative for trouble swallowing. Eyes:  Negative for visual disturbance. Respiratory:  Negative for shortness of breath and wheezing. Cardiovascular:  Negative for chest pain and palpitations.    Gastrointestinal:  Negative for constipation, Right Forearm 2 days         Peripheral IV - Single Lumen 06/06/19 1800 20 G Right Forearm 2 days         Peripheral IV - Single Lumen 06/06/19 2029 20 G Left;Lateral Forearm 1 day                Physical Exam   Constitutional: He appears well-developed and well-nourished. No distress.   HENT:   Head: Normocephalic and atraumatic.   Mouth/Throat: No oropharyngeal exudate.   Eyes: Conjunctivae are normal. Right eye exhibits no discharge. Left eye exhibits no discharge. No scleral icterus.   Cardiovascular: Normal rate and normal heart sounds. Exam reveals no gallop and no friction rub.   Pulmonary/Chest: Effort normal and breath sounds normal. He has no wheezes. He has no rales.   intubated   Abdominal: Soft. He exhibits no distension.   Musculoskeletal: He exhibits edema.   Left foot dressing c/d/i    Neurological:   sedated   Skin: Skin is warm and dry. No rash noted. He is not diaphoretic. No erythema.       Significant Labs: All pertinent lab results from the last 24 hours have been reviewed.    Significant Imaging: Reviewed    Assessment and Plan:     * Cellulitis of Left foot  - Vitals are WNLs  - Continue on Vanc and CTX.    Atrial fibrillation and Focal atrial tachycardia  - Continue on ASA, Plavix and Statin.  - Continue on heparin gtt.    Acute HF (heart failure)  Known CAD with history of 2v CABG (1994) LIMA-D1 and SVG-LAD, recently had C 1/29/19 for worsening angina s/p DUC x 2 (mid and distal LCx). He was also found to have 50% D1 stenosis (distal to the LIMA graft insertion) and 60% proximal SVG graft stenosis and RCA .      S/p SVG DUC 6/6/19  Continue medical therapy with DAPT, high intensity statin  IABP placed via R CFA 6/6/19, still requiring pressor support although decreasing (levophed 0.22 - 0.08 this morning)  CXR daily for IABP/ET tube placement (will extubate today).  Continue on 80 mg IV lasix BID.             VTE Risk Mitigation (From admission, onward)        Ordered     heparin  diarrhea, nausea and vomiting. Endocrine: Negative for cold intolerance, heat intolerance and polydipsia. Genitourinary:  Negative for difficulty urinating and frequency. Musculoskeletal:  Positive for back pain, joint swelling and myalgias. Negative for arthralgias and gait problem. Skin:  Negative for rash. Neurological:  Negative for dizziness, seizures, syncope, weakness and headaches. Hematological:  Does not bruise/bleed easily. Psychiatric/Behavioral:  Negative for dysphoric mood. The patient is nervous/anxious. All other systems reviewed and are negative.       Patient Active Problem List   Diagnosis    Bipolar depression ([address])    Hypokalemia    Adrenal insufficiency (Dade's disease) ([address])    Vertigo    Fibromyalgia    Osteoarthritis of lumbosacral spine without myelopathy    HLD (hyperlipidemia)    Orthostatic hypotension    History of TIAs    History of migraine    Neck pain    Low back pain    PE (pulmonary thromboembolism) ([facility])    Hypertension    Bipolar II disorder ([facility])    Chronic back pain    Anxiety    Leukocytosis    Chronic anticoagulation    Anemia    Ambulatory dysfunction    Edema    Closed fracture of distal ends of left radius and ulna    Fracture of multiple ribs of right side    History of anxiety    History of pulmonary embolism    Right distal ulnar fracture    Primary osteoarthritis of left shoulder    Status post total replacement of left shoulder    Alkalosis    Primary osteoarthritis of right shoulder    S/P reverse total shoulder arthroplasty, left    Iron deficiency anemia following bariatric surgery    B12 deficiency    Bilateral occipital neuralgia    Closed fracture of styloid process of radius    Fatigue    Anticoagulated by anticoagulation treatment    History of peptic ulcer disease    Hyperglycemia    Hypersomnia    Intractable vomiting with nausea    Lower leg DVT (deep venous thromboembolism), acute, right ([facility])    Lumbar spondylosis    Migraine aura, infusion 1,000 units/500 ml in 0.9% NaCl (pressure line flush)  Intra-op continuous PRN      06/06/19 1052     heparin 25,000 units in dextrose 5% 250 mL (100 units/mL) infusion LOW INTENSITY nomogram - [facility]  Continuous      06/05/19 0853     heparin 25,000 units in dextrose 5% (100 units/ml) IV bolus from bag - ADDITIONAL PRN BOLUS - 60 units/kg (max bolus 4000 units)  As needed (PRN)      06/05/19 0853     heparin 25,000 units in dextrose 5% (100 units/ml) IV bolus from bag - ADDITIONAL PRN BOLUS - 30 units/kg (max bolus 4000 units)  As needed (PRN)      06/05/19 0853     heparin 25,000 units in dextrose 5% 250 mL (100 units/mL) infusion LOW INTENSITY nomogram - [facility]  Continuous      06/02/19 1508     IP VTE LOW RISK PATIENT  Once      05/29/19 5154          Louise Shabazz MD  Cardiology  [facility]   persistent    Mild episode of recurrent major depressive disorder (HCC)    Myofascial pain    Peripheral neuropathy    Recurrent falls    Right hip pain    Subclinical hyperthyroidism    Thoracic or lumbosacral neuritis or radiculitis    Benzodiazepine dependence (HCC)    Weakness of both lower extremities    Postmenopausal    Obesity (BMI 30-39. 9)    Preoperative clearance    S/P reverse total shoulder arthroplasty, right    Aftercare following right shoulder joint replacement surgery    IFG (impaired fasting glucose)    Other hyperlipidemia       Past Medical History:   Diagnosis Date    Adrenal insufficiency (Andrés's disease) ([address])     Anemia 09 15 2022    Was found in bloodwork done on that day    Anxiety     Arthritis     Bilateral pulmonary contusion 07/03/2020    Bipolar disorder     Cervical radiculopathy     Chest pain     seen in ER 9/18, dx with gas    Chronic back pain     Chronic pain disorder     lumbar    Closed head injury with brief loss of consciousness ([address]) 10/15/2019    Contusion of right hand 07/03/2021    Depression     DVT, lower extremity ([address])     1991 and 2019 now on eliquis    Exercises 5 to 6 times per week     5 days per week with weights/band and also goes to PT 2x/week    Fall down stairs 07/03/2020    Fibromyalgia     Fibromyalgia, primary     GERD (gastroesophageal reflux disease)     Headache(784.0) 2018    I get migraines. See Neurologist for this.     Hematoma of parietal scalp 07/03/2020    History of Clostridioides difficile infection     History of MRSA infection     pt denies having this    History of recent fall 07/2021    "possibly injured left shoulder'    History of TIAs     cannot remember details    Hypertension     Hypokalemia     Intractable migraine without aura and without status migrainosus 10/02/2019    Left shoulder pain     "not too bad" goes to PT 2x/week    Migraine     Obesity 2019    Osteoporosis     Psychiatric disorder     Anxiety, major depression, bipolar    Spinal stenosis     Syncope 2014    orthostatic hypotension    Wears dentures     upper    Wears glasses        Past Surgical History:   Procedure Laterality Date    APPENDECTOMY      CAST APPLICATION Left 11/39/4475    Procedure: Application short-arm splint;  Surgeon: Kyle Tate MD;  Location: [facility];  Service: Orthopedics    COLONOSCOPY      FL INJECTION LEFT SHOULDER (ARTHROGRAM)  11/10/2021    GASTRIC RESTRICTION SURGERY      Gastric Sleeve Nov 2015    HIP SURGERY  2016    Hip Replacement    HYSTERECTOMY      DONALD    JOINT REPLACEMENT      Left Knee 2011 and Right Hip 2010    KNEE SURGERY  2015    Knee Replacement    OOPHORECTOMY      ORIF WRIST FRACTURE Left 07/04/2020    Procedure: Open reduction and internal fixation left radius and ulnar shaft fracture;  Surgeon: Kyle Tate MD;  Location: [facility];  Service: Orthopedics    WY ARTHROPLASTY GLENOHUMERAL JOINT TOTAL SHOULDER Left 03/30/2021    Procedure: ARTHROPLASTY SHOULDER - anatomic;  Surgeon: Julius Chopra MD;  Location: [facility];  Service: Orthopedics    W[place] ARTHROPLASTY GLENOHUMERAL JOINT TOTAL SHOULDER Right 10/24/2023    Procedure: ANATOMIC VS REVERSE TOTAL SHOULDER ARTHROPLASTY, WITH BICEPS TENDONESIS;  Surgeon: Mae Thorpe MD;  Location: [facility];  Service: Orthopedics    W[place] NATIVID[place] SHOULDER ARTHRPLSTY HUMERAL&GLENOID COMPNT Left 12/21/2021    Procedure: REVISION OF TOTAL SHOULDER ARTHROPLASTY TO REVERSE TOTAL SHOULDER ARTHROPLASTY;  Surgeon: Mae Thorpe MD;  Location: [facility];  Service: Orthopedics       Family History   Problem Relation Age of Onset    Breast cancer Mother 40    Migraines Mother     Arthritis Mother     Depression Mother     Cancer Mother     Anxiety disorder Mother     Kidney disease Father     Heart disease Father     Diabetes Father     Arthritis Father     Hypertension Father     Brain cancer Brother     Seizures Brother        Social History     Occupational History    Occupation: retired   Tobacco Use    Smoking status: Former     Current packs/day: 0.00     Average packs/day: 0.2 packs/day for 20.0 years (4.0 ttl pk-yrs)     Types: Cigarettes     Start date: 1/1/1998     Quit date: 1/1/2008     Years since quitting: 15.9    Smokeless tobacco: Never    Tobacco comments:     quit   Vaping Use    Vaping status: Never Used   Substance and Sexual Activity    Alcohol use: Not Currently     Alcohol/week: 2.0 standard drinks of alcohol     Types: 1 Glasses of wine, 1 Standard drinks or equivalent per week     Comment: occasionally    Drug use: Never     Comment: na    Sexual activity: Not Currently     Partners: Male     Birth control/protection: Abstinence     Comment: defer       Current Outpatient Medications on File Prior to Visit   Medication Sig    amLODIPine (NORVASC) 5 mg tablet Take 5 mg by mouth daily    apixaban (Eliquis) 5 mg Take 1 tablet (5 mg total) by mouth 2 (two) times a day    ARIPiprazole (ABILIFY) 5 mg tablet TAKE 1 TABLET EVERY MORNING (CORRECTION BY DR OF PREV SCRIPT)    atorvastatin (LIPITOR) 40 mg tablet TAKE 1 TABLET BY MOUTH EVERY DAY AT NIGHT    cholecalciferol (VITAMIN D3) 1,000 units tablet     dihydroergotamine (DHE) 1 mg/mL INJECT 1 ML (1 MG TOTAL) INJECT INTO THE MUSCLE AS NEEDED FOR MIGRAINE.    furosemide (LASIX) 20 mg tablet Take 20 mg by mouth as needed Pt reports calls her [facility] cardiologist Dr Eloina Laughlin before taking     gabapentin (NEURONTIN) 600 MG tablet Take 600 mg by mouth 3 (three) times a day    lamoTRIgine (LaMICtal) 200 MG tablet Take 200 mg by mouth daily.     LORazepam (ATIVAN) 0.5 mg tablet Take 0.5 mg by mouth daily as needed    meclizine (ANTIVERT) 12.5 MG tablet Take 1 tablet (12.5 mg total) by mouth every 8 (eight) hours as needed for dizziness for up to 7 days    methocarbamol (ROBAXIN) 750 mg tablet     ondansetron (ZOFRAN-ODT) 4 mg disintegrating tablet TAKE 1 TABLET BY MOUTH EVERY 8 HOURS AS NEEDED FOR NAUSEA AND VOMITING potassium chloride (K-DUR,KLOR-CON) 20 mEq tablet Take 20 mEq by mouth daily      Ubrelvy 100 MG tablet TAKE 1 TABLET TWICE A DAY AS NEEDED FOR HEADACHE, LIMIT 16 TABS PER MONTH    B-D INSULIN SYRINGE 1CC/25GX1" 25G X 1" 1 ML MISC USE WITH DHE (Patient not taking: Reported on 12/14/2023)    celecoxib (CeleBREX) 200 mg capsule TAKE 1 CAPSULE BY MOUTH EVERY DAY (Patient not taking: Reported on 11/17/2023)    hydrOXYzine HCL (ATARAX) 25 mg tablet TAKE 2 TABLETS (50 MG TOTAL) BY MOUTH DAILY AT BEDTIME AS NEEDED (INSOMNIA) (Patient not taking: Reported on 11/17/2023)    nystatin (MYCOSTATIN) cream Apply topically 2 (two) times a day     No current facility-administered medications on file prior to visit. Allergies   Allergen Reactions    Ketorolac Itching and Other (See Comments)     [toradol] Itching, hives    Mushroom Extract Complex - Food Allergy Hives    Oxycodone Other (See Comments)     Irritable ,severe mood change        Physical Exam    BP (!) 195/73   Pulse 105   Ht 5' 8" (1.727 m)   Wt 112 kg (248 lb)   SpO2 98%   BMI 37.71 kg/m²     Constitutional: normal, well developed, well nourished, alert, in no distress and non-toxic and no overt pain behavior. Eyes: anicteric  HEENT: grossly intact  Neck:  symmetric, trachea midline and no masses   Pulmonary:even and unlabored  Cardiovascular:No edema or pitting edema present  Skin:Normal without rashes or lesions and well hydrated  Psychiatric:Mood and affect appropriate  Neurologic:Cranial Nerves II-XII grossly intact  Musculoskeletal:normal, except for tenderness to palpation over the lower lumbar facet joints, she does have pain reproduced with lumbar extension as well as extension with rotation, forward flexion is essentially full and pain-free, patient is able to stand on heels and toes without difficulty    Imaging    Study Result    Narrative & Impression         RIGHT SHOULDER     INDICATION:   Presence of right artificial shoulder joint. COMPARISON:  Comparison made with previous examination(s) dated (DX) 24-Oct-2023,(CT) 26-Jul-2023,(CT) 08-Jul-2023,(DX) 30-May-2023,(DX) 28-Apr-2022. VIEWS:  XR SHOULDER 2+ VW RIGHT  Images: 2     FINDINGS:     There is no acute fracture or dislocation. Unremarkable appearance of reverse shoulder arthroplasty. No lytic or blastic osseous lesion. Soft tissues are unremarkable. IMPRESSION:        Unremarkable appearance of reverse total shoulder arthroplasty.      Electronically signed: 11/06/2023 11:10 AM Yumiko De Paz, MPH,MD

## 2023-12-15 ENCOUNTER — OFFICE VISIT (OUTPATIENT)
Dept: PHYSICAL THERAPY | Facility: CLINIC | Age: 63
End: 2023-12-15
Payer: COMMERCIAL

## 2023-12-15 DIAGNOSIS — Z96.611 S/P REVERSE TOTAL SHOULDER ARTHROPLASTY, RIGHT: Primary | ICD-10-CM

## 2023-12-15 PROCEDURE — 97112 NEUROMUSCULAR REEDUCATION: CPT

## 2023-12-15 PROCEDURE — 97110 THERAPEUTIC EXERCISES: CPT

## 2023-12-15 PROCEDURE — 97140 MANUAL THERAPY 1/> REGIONS: CPT

## 2023-12-15 NOTE — PROGRESS NOTES
Daily Note     Today's date: 12/15/2023  Patient name: Melo Small  : 1960  MRN: [id number]  Referring provider: Nancy Tamez  Dx:   Encounter Diagnosis     ICD-10-CM    1. S/P reverse total shoulder arthroplasty, right  Z96.611           Start Time: 930  Stop Time: 1000  Total time in clinic (min): 30 minutes    Subjective: pt stated that her arm is feeling pretty good today with no new complaints of pain. Pt stated that she saw pain management yesterday and that she is going to have an ablation for her back at some point soon. Objective: See treatment diary below      Assessment: Pt tolerated warm up on pulleys well at beginning of session without increase in discomfort during or after activity. Pt continues to be challenged appropriately with AAROM activities of abduction baton AAROM and wall slides with mild discomfort and fatigue. Pt required mild verbal cueing to perform mobility and deltoid isometric activities with proper form. Pt tolerated manual R shoulder PROM with reported decrease in stiffness post manual treatment. Pt would benefit from continued skilled PT to improve R shoulder strength, mobility, ROM, and functional ability. Plan: Continue per plan of care. Progress treatment as tolerated. Precautions: S/p reverse R TSA 10/24. 5th week as of  (sub acute phase). As per MD: "Initiate AAROM and progress to AROM to promote shoulder elevation/scaption avoiding scapular compensation. PROM to the following permanent limits:  flexion: 120 and ER: to first end feel and not to exceed 50 degrees.   Functional IR: not past greater trochanter."    Date 11/28 11/30 12/12 12/15         Visit # IE 2 3 4         FOTO IE             Re-eval IE              Manuals 11/28 11/30 12/12 12/15         R shoulder PROM  HY DK DK                                                Neuro Re-Ed 11/28 11/30 12/12 12/15         Scap retracts  2x10           Deltoid isometrics Ant, lateral, post against wall np 10x ant, post, abd, ER 10x5" ant, post, abd, ER         ER band walkout   np           pendulums   1' fwd, 1' ea clock/c-clock 1' fwd, 1' circles ea         Pt Edu   DK DK                                   Ther Ex 11/28 11/30 12/12 12/15         Pulleys  5' 5' 5'         Table slides  np  10x5" scap         Wall slides HEP*  10x5" 10x5" flex         Sitting abd AAROM baton 10x5" np 10x5" 10x5"         Sup flex AAROM baton 10x5" np 10x10" 10x10"         Shoulder raises AROM   hold          Finger ladder  10x5" 10x5" flex, abd 10x5" flex,                       Ther Activity 11/28 11/30 12/12          Cones hold                         Gait Training 11/28 11/30 12/12                                    Modalities 11/28 11/30

## 2023-12-19 ENCOUNTER — TELEPHONE (OUTPATIENT)
Age: 63
End: 2023-12-19

## 2023-12-19 ENCOUNTER — OFFICE VISIT (OUTPATIENT)
Dept: PHYSICAL THERAPY | Facility: CLINIC | Age: 63
End: 2023-12-19
Payer: COMMERCIAL

## 2023-12-19 DIAGNOSIS — Z96.611 S/P REVERSE TOTAL SHOULDER ARTHROPLASTY, RIGHT: Primary | ICD-10-CM

## 2023-12-19 PROCEDURE — 97112 NEUROMUSCULAR REEDUCATION: CPT

## 2023-12-19 PROCEDURE — 97110 THERAPEUTIC EXERCISES: CPT

## 2023-12-19 PROCEDURE — 97140 MANUAL THERAPY 1/> REGIONS: CPT

## 2023-12-19 NOTE — TELEPHONE ENCOUNTER
Caller: Maki    Doctor: Shree     Reason for call: patient returning schedulers phone call     Call back#: [phone]

## 2023-12-19 NOTE — PROGRESS NOTES
"Daily Note     Today's date: 2023  Patient name: Li Torre  : 1960  MRN: [id number]  Referring provider: Daryn Daniel*  Dx:   Encounter Diagnosis     ICD-10-CM    1. S/P reverse total shoulder arthroplasty, right  Z96.611           Start Time: 930  Stop Time: 1010  Total time in clinic (min): 40 minutes    Subjective: Pt stated that her R shoulder is feeling very good today but her back is giving her some issues.       Objective: See treatment diary below      Assessment: Pt tolerated warm up on pulleys well at beginning of session without increase in discomfort during or after activity. Pt was challenged appropriately with addition of active ER with resistance well without increase in discomfort but mild fatigue post performance. Pt had difficulty with trial of active R shoulder raises and could not actively raise shoulder to 90 degrees even without resistance. Pt tolerated addition of rowing activity with mild fatigue post performance. Pt was educated on proper exercises to perform at home as part of HEP, pt understood education. Pt tolerated manual R shoulder PROM with reported decrease in stiffness post manual treatment. Pt would benefit from continued skilled PT to improve R shoulder ROM, mobility, flexibility, strength, and functional ability.     Plan: Continue per plan of care.  Progress treatment as tolerated.       Precautions: S/p reverse R TSA 10/24.  5th week as of  (sub acute phase). As per MD: \"Initiate AAROM and progress to AROM to promote shoulder elevation/scaption avoiding scapular compensation.    PROM to the following permanent limits:  flexion: 120 and ER: to first end feel and not to exceed 50 degrees.  Functional IR: not past greater trochanter.\"    Date 11/28 11/30 12/12 12/15 12/18        Visit # IE 2 3 4 5        FOTO IE     nv        Re-eval IE              Manuals 11/28 11/30 12/12 12/15 12/18        R shoulder PROM  HY DK ASA DK                         " "                      Neuro Re-Ed 11/28 11/30 12/12 12/15 12/18        Scap retracts  2x10           Deltoid isometrics Ant, lateral, post against wall np 10x ant, post, abd, ER 10x5\" ant, post, abd, ER 10x5\" ant, post, abd, ER        ER band walkout   np           pendulums   1' fwd, 1' ea clock/c-clock 1' fwd, 1' circles ea         Pt [facility]   DK DK DK                                  Ther Ex 11/28 11/30 12/12 12/15 12/18        Pulleys  5' 5' 5' 5'        Table slides  np  10x5\" scap 10x10\" flex        Wall slides HEP*  10x5\" 10x5\" flex 10x5\" flex        Sitting abd AAROM baton 10x5\" np 10x5\" 10x5\" 10x5\"         Sup flex AAROM baton 10x5\" np 10x10\" 10x10\" 10x10\"        Shoulder raises AROM   hold  3x 2# R, 3x 0#        Finger ladder  10x5\" 10x5\" flex, abd 10x5\" flex,  10x5\" flex        Rows      2x10 GTB        S/l ER     2x10 1# R        ER/IR     2x10 black TB ER        Ther Activity 11/28 11/30 12/12          Cones hold                         Gait Training 11/28 11/30 12/12                                    Modalities 11/28 11/30                                                "

## 2023-12-19 NOTE — TELEPHONE ENCOUNTER
Caller: jaya Gambino    Doctor: Shree    Reason for call: pt asking to speak with Shweta    Call back#: [phone]

## 2023-12-21 ENCOUNTER — APPOINTMENT (OUTPATIENT)
Dept: PHYSICAL THERAPY | Facility: CLINIC | Age: 63
End: 2023-12-21
Payer: COMMERCIAL

## 2023-12-26 ENCOUNTER — OFFICE VISIT (OUTPATIENT)
Dept: PHYSICAL THERAPY | Facility: CLINIC | Age: 63
End: 2023-12-26
Payer: COMMERCIAL

## 2023-12-26 DIAGNOSIS — Z96.611 S/P REVERSE TOTAL SHOULDER ARTHROPLASTY, RIGHT: Primary | ICD-10-CM

## 2023-12-26 PROCEDURE — 97110 THERAPEUTIC EXERCISES: CPT

## 2023-12-26 PROCEDURE — 97140 MANUAL THERAPY 1/> REGIONS: CPT

## 2023-12-26 PROCEDURE — 97112 NEUROMUSCULAR REEDUCATION: CPT

## 2023-12-26 NOTE — PROGRESS NOTES
"Daily Note     Today's date: 2023  Patient name: Li Torre  : 1960  MRN: [id number]  Referring provider: Daryn Daniel*  Dx:   Encounter Diagnosis     ICD-10-CM    1. S/P reverse total shoulder arthroplasty, right  Z96.611           Start Time: 930  Stop Time: 1010  Total time in clinic (min): 40 minutes    Subjective: Pt stated that her R shoulder has been feeling good without much pain but stated that she probably has been using it more than she should at this point. Pt stated that she continues to have difficulty with wall slide activity as well as actively raising her arm past 90 degrees without assistance.       Objective: See treatment diary below      Assessment: Pt tolerated warm up on pulleys well at beginning of session without increase in discomfort during or after activity. Pt was challenged with addition of functional reaching activity with cones but demonstrated moderate fatigue of R shoulder after performing 3 raises with decreased ROM noted with subsequent trial of reaching. Pt was given light resistance band to use at home with trial of new gentle strengthening activities. Pt was instructed to continue performing active assisted ROM activities at home and to continue to try performing active R shoulder flexion towards 90 degrees as tolerated and able, pt understood education well. Pt tolerated manual R shoulder PROM with reported decrease in stiffness post manual treatment. Pt would benefit from continued skilled PT to improve R shoulder strength, mobility, flexibility, and functional ability.       Plan: Continue per plan of care.  Progress treatment as tolerated.       Precautions: S/p reverse R TSA 10/24.  5th week as of  (sub acute phase). As per MD: \"Initiate AAROM and progress to AROM to promote shoulder elevation/scaption avoiding scapular compensation.    PROM to the following permanent limits:  flexion: 120 and ER: to first end feel and not to exceed 50 " "degrees.  Functional IR: not past greater trochanter.\"    Date 11/28 11/30 12/12 12/15 12/18 12/26       Visit # IE 2 3 4 5 6       FOTO IE     nv        Re-eval IE              Manuals 11/28 11/30 12/12 12/15 12/18 12/26       R shoulder PROM  HY DK DK DK DK                                              Neuro Re-Ed 11/28 11/30 12/12 12/15 12/18 12/26       Scap retracts  2x10           Deltoid isometrics Ant, lateral, post against wall np 10x ant, post, abd, ER 10x5\" ant, post, abd, ER 10x5\" ant, post, abd, ER 10x5\" ant, post, abd, ER       ER band walkout   np           pendulums   1' fwd, 1' ea clock/c-clock 1' fwd, 1' circles ea         Pt Edu   DK DK DK DK       No monies      10x PTB                    Ther Ex 11/28 11/30 12/12 12/15 12/18 12/26       Pulleys  5' 5' 5' 5' 5'       Table slides  np  10x5\" scap 10x10\" flex 10x5\" flex, hori abd       Wall slides HEP*  10x5\" 10x5\" flex 10x5\" flex 10x5\" flex       Sitting abd AAROM baton 10x5\" np 10x5\" 10x5\" 10x5\"  nv       Sup flex AAROM baton 10x5\" np 10x10\" 10x10\" 10x10\" 10x5\" flex       Shoulder raises AROM   hold  3x 2# R, 3x 0# 10x flex 0#        Finger ladder  10x5\" 10x5\" flex, abd 10x5\" flex,  10x5\" flex 10x5\" flex       Rows      2x10 GTB 20x BTB       S/l ER     2x10 1# R 2x10 1# R       ER/IR     2x10 black TB ER 2x10 black TB ER, 2x10 PTB ER/IR       Ther Activity 11/28 11/30 12/12 12/26       Cones hold     3*                    Gait Training 11/28 11/30 12/12                                    Modalities 11/28 11/30                                                  "

## 2023-12-28 ENCOUNTER — OFFICE VISIT (OUTPATIENT)
Dept: PHYSICAL THERAPY | Facility: CLINIC | Age: 63
End: 2023-12-28
Payer: COMMERCIAL

## 2023-12-28 DIAGNOSIS — Z96.611 S/P REVERSE TOTAL SHOULDER ARTHROPLASTY, RIGHT: Primary | ICD-10-CM

## 2023-12-28 PROCEDURE — 97140 MANUAL THERAPY 1/> REGIONS: CPT

## 2023-12-28 PROCEDURE — 97110 THERAPEUTIC EXERCISES: CPT

## 2023-12-28 PROCEDURE — 97112 NEUROMUSCULAR REEDUCATION: CPT

## 2023-12-28 NOTE — PROGRESS NOTES
"Daily Note     Today's date: 2023  Patient name: Li Torre  : 1960  MRN: [id number]  Referring provider: Daryn Daniel*  Dx:   Encounter Diagnosis     ICD-10-CM    1. S/P reverse total shoulder arthroplasty, right  Z96.611           Start Time: 930  Stop Time: 1005  Total time in clinic (min): 35 minutes    Subjective: Pt stated that her shoulder is feeling good and has been trying her stretching and ROM activities at home everyday. Pt stated that she sometimes pushes it a little too much on accident at home trying to reach for objects on high shelves.       Objective: See treatment diary below      Assessment: Pt tolerated warm up on pulleys well at beginning of session without increase in discomfort during or after activity. Pt performed wall slides and finger ladder activity with increased shoulder flexion ROM today with no increase in discomfort, but did show mild to moderate fatigue at end of sets. Pt is challenged appropriately with light R shoulder strengthening activities well without increase in discomfort. Pt tolerated manual R shoulder PROM with reported decrease in stiffness post manual treatment. Pt would benefit from continued skilled PT to improve R shoulder strength, mobility, ROM, flexibility, and functional ability.       Plan: Continue per plan of care.  Progress treatment as tolerated.       Precautions: S/p reverse R TSA 10/24.  5th week as of  (sub acute phase). As per MD: \"Initiate AAROM and progress to AROM to promote shoulder elevation/scaption avoiding scapular compensation.    PROM to the following permanent limits:  flexion: 120 and ER: to first end feel and not to exceed 50 degrees.  Functional IR: not past greater trochanter.\"    Date 11/28 11/30 12/12 12/15 12/18 12/26 12/28      Visit # IE 2 3 4 5 6 7      FOTO IE     nv  NV      Re-eval IE              Manuals 11/28 11/30 12/12 12/15 12/18 12/26 12/28      R shoulder PROM  HY DK DK ASA BRAY DK          " "                                   Neuro Re-Ed 11/28 11/30 12/12 12/15 12/18 12/26 12/28      Scap retracts  2x10           Deltoid isometrics Ant, lateral, post against wall np 10x ant, post, abd, ER 10x5\" ant, post, abd, ER 10x5\" ant, post, abd, ER 10x5\" ant, post, abd, ER 10x5\" ant, post, abd      ER band walkout   np           pendulums   1' fwd, 1' ea clock/c-clock 1' fwd, 1' circles ea         Pt Edu   DK DK DK DK DK      No monies      10x PTB 2x10 PTB                   Ther Ex 11/28 11/30 12/12 12/15 12/18 12/26 12/28      Pulleys  5' 5' 5' 5' 5' 5'      Table slides  np  10x5\" scap 10x10\" flex 10x5\" flex, hori abd 10x5: flex, scap      Wall slides HEP*  10x5\" 10x5\" flex 10x5\" flex 10x5\" flex 10x5\" flex      Sitting abd AAROM baton 10x5\" np 10x5\" 10x5\" 10x5\"  nv 10x5\"      Sup flex AAROM baton 10x5\" np 10x10\" 10x10\" 10x10\" 10x5\" flex 10x5\" flex      Shoulder raises AROM   hold  3x 2# R, 3x 0# 10x flex 0#        Finger ladder  10x5\" 10x5\" flex, abd 10x5\" flex,  10x5\" flex 10x5\" flex 10x5\" flex      Rows      2x10 GTB 20x BTB 20x black TB      S/l ER     2x10 1# R 2x10 1# R 2x10 2# R      ER/IR     2x10 black TB ER 2x10 black TB ER, 2x10 PTB ER/IR 2x10 black ER/IR      Ther Activity 11/28 11/30 12/12 12/26 12/28      Cones hold     3*                    Gait Training 11/28 11/30 12/12                                    Modalities 11/28 11/30                                                    "

## 2024-01-02 ENCOUNTER — OFFICE VISIT (OUTPATIENT)
Dept: PHYSICAL THERAPY | Facility: CLINIC | Age: 64
End: 2024-01-02
Payer: COMMERCIAL

## 2024-01-02 DIAGNOSIS — Z96.611 S/P REVERSE TOTAL SHOULDER ARTHROPLASTY, RIGHT: Primary | ICD-10-CM

## 2024-01-02 PROCEDURE — 97140 MANUAL THERAPY 1/> REGIONS: CPT

## 2024-01-02 PROCEDURE — 97112 NEUROMUSCULAR REEDUCATION: CPT

## 2024-01-02 PROCEDURE — 97110 THERAPEUTIC EXERCISES: CPT

## 2024-01-02 NOTE — PROGRESS NOTES
"Daily Note     Today's date: 2024  Patient name: Li Torre  : 1960  MRN: [id number]  Referring provider: Daryn Daniel*  Dx:   Encounter Diagnosis     ICD-10-CM    1. S/P reverse total shoulder arthroplasty, right  Z96.611           Start Time: 1530  Stop Time: 1610  Total time in clinic (min): 40 minutes    Subjective: Pt stated that she is doing well today but her shoulder is a little tired as she was cleaning up her house from having many guests at her house over the weekend.       Objective: See treatment diary below      Assessment: Pt tolerated warm up on pulleys well at beginning of session without increase in discomfort during or after activity. Pt showed improved ability to perform very light resistance R shoulder strengthening activities without discomfort and with good ROM. Pt also showed slight improvement in R active ROM and is able to get to around 80 degrees of R shoulder flexion with active shoulder raises without discomfort. Pt was instructed to continue to perform mobility activities at home to improve active and active assisted ROM. Pt was also educated to continue to avoid lifting with RUE as pt stated that she sometimes forgets and uses her R shoulder for activities that are too vigorous to perform at this time. Pt had increased fatigue today with standing mobility activities. Pt tolerated manual R shoulder PROM with reported decrease in stiffness post manual treatment. Pt would benefit from continued skilled PT to improve R shoulder mobility, ROM, strength, scapular mobility, and functional ability.       Plan: Continue per plan of care.  Progress treatment as tolerated.       Precautions: S/p reverse R TSA 10/24.  5th week as of  (sub acute phase). As per MD: \"Initiate AAROM and progress to AROM to promote shoulder elevation/scaption avoiding scapular compensation.    PROM to the following permanent limits:  flexion: 120 and ER: to first end feel and not to " "exceed 50 degrees.  Functional IR: not past greater trochanter.\"    Date 11/28 11/30 12/12 12/15 12/18 12/26 12/28 1/2     Visit # IE 2 3 4 5 6 7 8     FOTO IE     nv  NV done     Re-eval IE              Manuals 11/28 11/30 12/12 12/15 12/18 12/26 12/28 1/2     R shoulder PROM  HY DK DK DK DK DK DK                                            Neuro Re-Ed 11/28 11/30 12/12 12/15 12/18 12/26 12/28 1/2     Scap retracts  2x10           Deltoid isometrics Ant, lateral, post against wall np 10x ant, post, abd, ER 10x5\" ant, post, abd, ER 10x5\" ant, post, abd, ER 10x5\" ant, post, abd, ER 10x5\" ant, post, abd 10x5\" ant, post, abd     ER band walkout   np           pendulums   1' fwd, 1' ea clock/c-clock 1' fwd, 1' circles ea         Pt Edu   DK DK DK DK DK DK     No monies      10x PTB 2x10 PTB 2x10 PTB                  Ther Ex 11/28 11/30 12/12 12/15 12/18 12/26 12/28 1/2     Pulleys  5' 5' 5' 5' 5' 5' 5'     Table slides  np  10x5\" scap 10x10\" flex 10x5\" flex, hori abd 10x5: flex, scap 10x5\" flex, scap     Wall slides HEP*  10x5\" 10x5\" flex 10x5\" flex 10x5\" flex 10x5\" flex 10x3\" flex     Sitting abd AAROM baton 10x5\" np 10x5\" 10x5\" 10x5\"  nv 10x5\" 10x5\"      Sup flex AAROM baton 10x5\" np 10x10\" 10x10\" 10x10\" 10x5\" flex 10x5\" flex 10x5\"     Shoulder raises AROM   hold  3x 2# R, 3x 0# 10x flex 0#   2x10 flex 0# to ~80 deg     Finger ladder  10x5\" 10x5\" flex, abd 10x5\" flex,  10x5\" flex 10x5\" flex 10x5\" flex nv     Rows      2x10 GTB 20x BTB 20x black TB 20x 10# BUE     S/l ER     2x10 1# R 2x10 1# R 2x10 2# R 2x10 2# R     ER/IR     2x10 black TB ER 2x10 black TB ER, 2x10 PTB ER/IR 2x10 black ER/IR 2x10 black ER/IR     Ther Activity 11/28 11/30 12/12 12/26 12/28 1/2     Cones hold     3*                    Gait Training 11/28 11/30 12/12                                    Modalities 11/28 11/30                                                      "

## 2024-01-04 ENCOUNTER — PATIENT OUTREACH (OUTPATIENT)
Dept: CASE MANAGEMENT | Facility: OTHER | Age: 64
End: 2024-01-04

## 2024-01-04 ENCOUNTER — OFFICE VISIT (OUTPATIENT)
Dept: PHYSICAL THERAPY | Facility: CLINIC | Age: 64
End: 2024-01-04
Payer: COMMERCIAL

## 2024-01-04 DIAGNOSIS — Z96.611 S/P REVERSE TOTAL SHOULDER ARTHROPLASTY, RIGHT: Primary | ICD-10-CM

## 2024-01-04 PROCEDURE — 97112 NEUROMUSCULAR REEDUCATION: CPT

## 2024-01-04 PROCEDURE — 97110 THERAPEUTIC EXERCISES: CPT

## 2024-01-04 PROCEDURE — 97140 MANUAL THERAPY 1/> REGIONS: CPT

## 2024-01-04 NOTE — PROGRESS NOTES
In basket reminder received for 90 day chart review.  Last known utilization per chart was 10/27/23 for dizziness post right shoulder replacement performed 10/24/23.  Patient seemingly compliant with attending scheduled appts and has continued outpatient physical therapy of right shoulder.  She established care mid last year under [facility] provider Dr. Derrick Vasquez.

## 2024-01-04 NOTE — PROGRESS NOTES
Unsuccessful attempt to reach patient for Complex Care management follow up call.  Left brief message informing patient of hand off to Dr. Vasquez outpatient RN Care Manage Kaylin Almeida message sent to patient as well regarding above with contact number for LELE Ewing CM.     In basket hand off complete to OP RN DAYANA PRIETO

## 2024-01-04 NOTE — PROGRESS NOTES
"Daily Note     Today's date: 2024  Patient name: Li Torre  : 1960  MRN: [id number]  Referring provider: Derrick Vasquez MD  Dx:   Encounter Diagnosis     ICD-10-CM    1. S/P reverse total shoulder arthroplasty, right  Z96.611           Start Time: 825  Stop Time: 915  Total time in clinic (min): 50 minutes    Subjective: Pt stated that she is doing well today but her shoulder is a little tight. Pt states she sees the MD on Monday.        Objective: See treatment diary below      Assessment: Pt tolerated warm up on pulleys well at beginning of session without increase in discomfort during or after activity. Pt continues to work towards goals of increased shoulder ROM. Pt tolerated manual R shoulder PROM with reported decrease in stiffness post manual treatment. Pt would benefit from continued skilled PT to improve R shoulder mobility, ROM, strength, scapular mobility, and functional ability.       Plan: Continue per plan of care.  Progress treatment as tolerated.       Precautions: S/p reverse R TSA 10/24.  5th week as of  (sub acute phase). As per MD: \"Initiate AAROM and progress to AROM to promote shoulder elevation/scaption avoiding scapular compensation.    PROM to the following permanent limits:  flexion: 120 and ER: to first end feel and not to exceed 50 degrees.  Functional IR: not past greater trochanter.\"    Date 11/28 11/30 12/12 12/15 12/18 12/26 12/28 1/2 1/4    Visit # IE 2 3 4 5 6 7 8 9    FOTO IE     nv  NV done     Re-eval IE              Manuals 11/28 11/30 12/12 12/15 12/18 12/26 12/28 1/2 1/4    R shoulder PROM  HY DK DK DK DK DK DK HY                                           Neuro Re-Ed 11/28 11/30 12/12 12/15 12/18 12/26 12/28 1/2 1/4    Scap retracts  2x10           Deltoid isometrics Ant, lateral, post against wall np 10x ant, post, abd, ER 10x5\" ant, post, abd, ER 10x5\" ant, post, abd, ER 10x5\" ant, post, abd, ER 10x5\" ant, post, abd 10x5\" ant, post, abd 10x5\" " "ant, post, abd    ER band walkout   np           pendulums   1' fwd, 1' ea clock/c-clock 1' fwd, 1' circles ea         Pt Edu   DK DK DK DK DK DK     No monies      10x PTB 2x10 PTB 2x10 PTB 2x10 PTB                 Ther Ex 11/28 11/30 12/12 12/15 12/18 12/26 12/28 1/2 1/4    Pulleys  5' 5' 5' 5' 5' 5' 5' 5'    Table slides  np  10x5\" scap 10x10\" flex 10x5\" flex, hori abd 10x5: flex, scap 10x5\" flex, scap 10x5\" flex, scap    Wall slides HEP*  10x5\" 10x5\" flex 10x5\" flex 10x5\" flex 10x5\" flex 10x3\" flex 10x3\" flex & abd    Sitting abd AAROM baton 10x5\" np 10x5\" 10x5\" 10x5\"  nv 10x5\" 10x5\"  10x5\"    Sup flex AAROM baton 10x5\" np 10x10\" 10x10\" 10x10\" 10x5\" flex 10x5\" flex 10x5\" 10x5\"    Shoulder raises AROM   hold  3x 2# R, 3x 0# 10x flex 0#   2x10 flex 0# to ~80 deg     Finger ladder  10x5\" 10x5\" flex, abd 10x5\" flex,  10x5\" flex 10x5\" flex 10x5\" flex nv 10x5\" flex & abd    Rows      2x10 GTB 20x BTB 20x black TB 20x 10# BUE 20x 10# BUE    S/l ER     2x10 1# R 2x10 1# R 2x10 2# R 2x10 2# R 2x10 2# R    ER/IR     2x10 black TB ER 2x10 black TB ER, 2x10 PTB ER/IR 2x10 black ER/IR 2x10 black ER/IR 2x10 black ER/IR    Ther Activity 11/28 11/30 12/12 12/26 12/28 1/2 1/4    Cones hold     3*                    Gait Training 11/28 11/30 12/12 1/4                              Modalities 11/28 11/30 1/4                                               "

## 2024-01-08 ENCOUNTER — OFFICE VISIT (OUTPATIENT)
Dept: OBGYN CLINIC | Facility: OTHER | Age: 64
End: 2024-01-08

## 2024-01-08 VITALS
HEART RATE: 101 BPM | HEIGHT: 68 IN | DIASTOLIC BLOOD PRESSURE: 82 MMHG | WEIGHT: 243 LBS | SYSTOLIC BLOOD PRESSURE: 141 MMHG | BODY MASS INDEX: 36.83 KG/M2

## 2024-01-08 DIAGNOSIS — Z96.611 S/P REVERSE TOTAL SHOULDER ARTHROPLASTY, RIGHT: Primary | ICD-10-CM

## 2024-01-08 PROCEDURE — 99024 POSTOP FOLLOW-UP VISIT: CPT | Performed by: ORTHOPAEDIC SURGERY

## 2024-01-08 NOTE — PROGRESS NOTES
Assessment:  1. S/P reverse total shoulder arthroplasty, right              Surgery: Reverse Total Shoulder Arthroplasty, With Biceps Tendonesis - Right  Date of Surgery: 10/24/2023        Discussion and Plan:   Patient is progressing nicely on exam today.  Recommend patient stop formal PT, explained have seen prolonged therapy can cause stress reactions.  Discussed function rehab on your own.    Follow Up:  Return if symptoms worsen or fail to improve.        CHIEF COMPLAINT:  Chief Complaint   Patient presents with    Right Shoulder - Post-op         SUBJECTIVE:  Li Torre is a 63 y.o. year old female who presents for follow up 11 weeks after Reverse Total Shoulder Arthroplasty, With Biceps Tendonesis - Right. Today patient reports overall she is is doing well.  She is attending PT as instructed.        PHYSICAL EXAMINATION:  General: well developed and well nourished, alert, oriented times 3, and appears comfortable  Psychiatric: Normal    MUSCULOSKELETAL EXAMINATION:  Incision: Clean, dry, intact and healed  Surgery Site: normal, no evidence of infection   Range of Motion: As expected  Neurovascular status: Neuro intact, good cap refill  Activity Restrictions: No restrictions           Scribe Attestation      I,:  Nora Noonan am acting as a scribe while in the presence of the attending physician.:       I,:  Daryn Daniel MD personally performed the services described in this documentation    as scribed in my presence.:

## 2024-01-09 ENCOUNTER — EVALUATION (OUTPATIENT)
Dept: PHYSICAL THERAPY | Facility: CLINIC | Age: 64
End: 2024-01-09
Payer: COMMERCIAL

## 2024-01-09 DIAGNOSIS — R26.2 AMBULATORY DYSFUNCTION: Primary | ICD-10-CM

## 2024-01-09 DIAGNOSIS — R29.898 WEAKNESS OF BOTH LOWER EXTREMITIES: ICD-10-CM

## 2024-01-09 PROCEDURE — 97110 THERAPEUTIC EXERCISES: CPT

## 2024-01-09 PROCEDURE — 97164 PT RE-EVAL EST PLAN CARE: CPT

## 2024-01-09 NOTE — PROGRESS NOTES
PT Re-Evaluation     Today's date: 2024  Patient name: Li Torre  : 1960  MRN: [id number]  Referring provider: Derrick Vasquez MD  Dx:   Encounter Diagnosis     ICD-10-CM    1. Ambulatory dysfunction  R26.2       2. Weakness of both lower extremities  R29.898           Start Time: 0900  Stop Time: 0955  Total time in clinic (min): 55 minutes    Assessment  Assessment details: Pt is a 63 y.o. year old female presenting to physical therapy for Ambulatory dysfunction  (primary encounter diagnosis) and Weakness of both lower extremities.  She presents with the following impairments: decreased BLE strength with increased deficits noted in the RLE, - radicular testing, decreased L/S ROM, poor squat mechanics, poor b/l TA and multifidi activation, and difficulty rising from chair affecting her function with household tasks, navigating stairs, walking, ADLs, walking long distances, and functional ability.  Pt will benefit from skilled physical therapy to address functional limitations noted in evaluation and meet patient goals.     R shoulder will be discharged from formal PT POC at this time.   Impairments: abnormal muscle firing, abnormal or restricted ROM, activity intolerance, impaired physical strength, lacks appropriate home exercise program, pain with function and poor body mechanics    Symptom irritability: moderateBarriers to therapy: S/p R reverse TSA 10/24/23.  History of R hip replacement and L knee replacement.   Understanding of Dx/Px/POC: good   Prognosis: good    Goals  ST. Pt will be independent with HEP.  2. Pt will improve L/S mobility deficits to min.  3. Pt will improve R quad activation to good.   LT. Pt will improve times on 5xSTS and TUG to WNL.  2. Pt will improve BLE strength grossly by 2 grades or more to improve gait ability.   3. Pt will improve b/l TA and multifidi activation to good.     Plan  Patient would benefit from: PT eval and skilled physical  therapy  Planned modality interventions: biofeedback, manual electrical stimulation, microcurrent electrical stimulation, TENS, electrical stimulation/Russian stimulation, thermotherapy: hydrocollator packs, cryotherapy and unattended electrical stimulation  Planned therapy interventions: abdominal trunk stabilization, joint mobilization, manual therapy, massage, ADL retraining, neuromuscular re-education, body mechanics training, patient education, postural training, strengthening, stretching, therapeutic activities, therapeutic exercise, flexibility, functional ROM exercises and home exercise program  Frequency: 2x week  Duration in weeks: 6  Treatment plan discussed with: patient        Subjective Evaluation    History of Present Illness  Mechanism of injury: Pt presents to the clinic for re evaluation to add her BLE and ambulatory dysfunction to the PT POC. Pt stated that she had a doctor's appointment for her R shoulder yesterday and her doctor was very happy with her progress and that she does not need to continue with formal skilled PT for her shoulder at this time, pt stated that she also is very happy at this time and will continue to work on exercises learned in PT at home. Pt stated that she can walk short distances but when it is longer distances her legs get tired very quickly and feels like they are going to give out. Pt stated that she does not have any pain with walking but just weakness. Pt stated that it is difficult for her to do many chores without taking breaks at home due to the legs getting tired. Pt reported that she does have a back issue that she is going to be seeing her doctor for on Friday to see if she needs therapy for that as well.   Patient Goals  Patient goals for therapy: increased motion, increased strength, improved balance, independence with ADLs/IADLs and return to sport/leisure activities    Pain  Current pain ratin  At best pain ratin  At worst pain rating:  "0          Objective     Active Range of Motion     Lumbar   Flexion:  WFL  Extension:  Restriction level: moderate  Left lateral flexion:  Restriction level: minimal  Right lateral flexion:  Restriction level: minimal  Left rotation:  Restriction level: minimal  Right rotation:  Restriction level: minimal  Mechanical Assessment    Cervical      Thoracic      Lumbar    Standing extension: repeated movements  Pain location: no change  Pain level: produced    Strength/Myotome Testing     Left Hip   Planes of Motion   Flexion: 4-  Extension: 3+  Abduction: 3+    Right Hip   Planes of Motion   Flexion: 3+  Extension: 3+  Abduction: 3    Left Knee   Flexion: 4  Extension: 4-  Quadriceps contraction: good    Right Knee   Flexion: 4  Extension: 4+  Quadriceps contraction: fair    Muscle Activation   Patient unable to activate left transverse abdominals, left multifidus, right transverse abdominals and right multifidus.     Additional Muscle Activation Details  Poor b/l TA and multifidi activation.     Tests     Lumbar   Negative SIJ compression and sacroiliac distraction.     Left   Negative crossed SLR, passive SLR and slump test.     Right   Negative crossed SLR, passive SLR and slump test.     General Comments:      Lumbar Comments  5xSTS: 17 seconds.  TU seconds.      Flowsheet Rows      Flowsheet Row Most Recent Value   PT/OT G-Codes    Current Score 56   Projected Score 62               Precautions: S/p R reverse TSA on 10/24/23    Date             Visit # 3            FOTO IE             Re-eval IE              Manuals                                                                 Neuro Re-Ed             clamshells 20x3\" GTB ea            marching             bridges 15x5\"                                                                Ther Ex             bike             SLR 10x ea w QS            S/l hip abd             Standing hip abd/ext 10x ea            sidestepping             Leg press      "                                  Ther Activity 1/9            squats             Step ups             Gait Training 1/9            hurdles                          Modalities 1/9

## 2024-01-11 ENCOUNTER — APPOINTMENT (OUTPATIENT)
Dept: PHYSICAL THERAPY | Facility: CLINIC | Age: 64
End: 2024-01-11
Payer: COMMERCIAL

## 2024-01-12 ENCOUNTER — TELEPHONE (OUTPATIENT)
Age: 64
End: 2024-01-12

## 2024-01-12 ENCOUNTER — HOSPITAL ENCOUNTER (OUTPATIENT)
Dept: RADIOLOGY | Facility: MEDICAL CENTER | Age: 64
End: 2024-01-12
Payer: COMMERCIAL

## 2024-01-12 VITALS
HEART RATE: 99 BPM | OXYGEN SATURATION: 98 % | DIASTOLIC BLOOD PRESSURE: 91 MMHG | TEMPERATURE: 98.3 F | SYSTOLIC BLOOD PRESSURE: 156 MMHG | RESPIRATION RATE: 18 BRPM

## 2024-01-12 DIAGNOSIS — M47.816 LUMBAR SPONDYLOSIS: ICD-10-CM

## 2024-01-12 PROCEDURE — 64494 INJ PARAVERT F JNT L/S 2 LEV: CPT | Performed by: PHYSICAL MEDICINE & REHABILITATION

## 2024-01-12 PROCEDURE — 64493 INJ PARAVERT F JNT L/S 1 LEV: CPT | Performed by: PHYSICAL MEDICINE & REHABILITATION

## 2024-01-12 RX ADMIN — Medication 3 ML: at 14:01

## 2024-01-12 NOTE — H&P
"History of Present Illness: The patient is a 63 y.o. female who presents with complaints of bilateral lower back pain    Past Medical History:   Diagnosis Date    Adrenal insufficiency (Andrés's disease) (formerly [facility])     Anemia 09 15 2022    Was found in bloodwork done on that day    Anxiety     Arthritis     Bilateral pulmonary contusion 07/03/2020    Bipolar disorder     Cervical radiculopathy     Chest pain     seen in ER 9/18, dx with gas    Chronic back pain     Chronic pain disorder     lumbar    Closed head injury with brief loss of consciousness (formerly [facility]) 10/15/2019    Contusion of right hand 07/03/2021    Depression     DVT, lower extremity (formerly [facility])     1991 and 2019 now on eliquis    Exercises 5 to 6 times per week     5 days per week with weights/band and also goes to PT 2x/week    Fall down stairs 07/03/2020    Fibromyalgia     Fibromyalgia, primary     GERD (gastroesophageal reflux disease)     Headache(784.0) 2018    I get migraines. See Neurologist for this.    Hematoma of parietal scalp 07/03/2020    History of Clostridioides difficile infection     History of MRSA infection     pt denies having this    History of recent fall 07/2021    \"possibly injured left shoulder'    History of TIAs     cannot remember details    Hypertension     Hypokalemia     Intractable migraine without aura and without status migrainosus 10/02/2019    Left shoulder pain     \"not too bad\" goes to PT 2x/week    Migraine     Obesity 2019    Osteoporosis     Psychiatric disorder     Anxiety, major depression, bipolar    Spinal stenosis     Syncope 2014    orthostatic hypotension    Wears dentures     upper    Wears glasses        Past Surgical History:   Procedure Laterality Date    APPENDECTOMY      CAST APPLICATION Left 07/04/2020    Procedure: Application short-arm splint;  Surgeon: Sony Zhang MD;  [facility];  Service: Orthopedics    COLONOSCOPY      FL INJECTION LEFT SHOULDER (ARTHROGRAM)  11/10/2021    GASTRIC " "RESTRICTION SURGERY      Gastric Sleeve Nov 2015    HIP SURGERY  2016    Hip Replacement    HYSTERECTOMY      DONALD    JOINT REPLACEMENT      Left Knee 2011 and Right Hip 2010    KNEE SURGERY  2015    Knee Replacement    OOPHORECTOMY      ORIF WRIST FRACTURE Left 07/04/2020    Procedure: Open reduction and internal fixation left radius and ulnar shaft fracture;  Surgeon: Sony Zhang MD;  [facility];  Service: Orthopedics    IL ARTHROPLASTY GLENOHUMERAL JOINT TOTAL SHOULDER Left 03/30/2021    Procedure: ARTHROPLASTY SHOULDER - anatomic;  Surgeon: Daryn Daniel MD;  [facility];  Service: Orthopedics    IL ARTHROPLASTY GLENOHUMERAL JOINT TOTAL SHOULDER Right 10/24/2023    Procedure: ANATOMIC VS REVERSE TOTAL SHOULDER ARTHROPLASTY, WITH BICEPS TENDONESIS;  Surgeon: Daryn Daniel MD;  [facility];  Service: Orthopedics    IL NATIVIDAD SHOULDER ARTHRPLSTY HUMERAL&GLENOID COMPNT Left 12/21/2021    Procedure: REVISION OF TOTAL SHOULDER ARTHROPLASTY TO REVERSE TOTAL SHOULDER ARTHROPLASTY;  Surgeon: Daryn Daniel MD;  [facility];  Service: Orthopedics         Current Outpatient Medications:     amLODIPine (NORVASC) 5 mg tablet, Take 5 mg by mouth daily, Disp: , Rfl:     apixaban (Eliquis) 5 mg, Take 1 tablet (5 mg total) by mouth 2 (two) times a day, Disp: 180 tablet, Rfl: 3    ARIPiprazole (ABILIFY) 5 mg tablet, TAKE 1 TABLET EVERY MORNING (CORRECTION BY DR OF PREV SCRIPT), Disp: , Rfl:     atorvastatin (LIPITOR) 40 mg tablet, TAKE 1 TABLET BY MOUTH EVERY DAY AT NIGHT, Disp: , Rfl:     B-D INSULIN SYRINGE 1CC/25GX1\" 25G X 1\" 1 ML MISC, , Disp: , Rfl:     celecoxib (CeleBREX) 200 mg capsule, TAKE 1 CAPSULE BY MOUTH EVERY DAY, Disp: 30 capsule, Rfl: 1    cholecalciferol (VITAMIN D3) 1,000 units tablet, , Disp: , Rfl:     dihydroergotamine (DHE) 1 mg/mL, INJECT 1 ML (1 MG TOTAL) INJECT INTO THE MUSCLE AS NEEDED FOR MIGRAINE., Disp: , Rfl:     furosemide (LASIX) 20 " mg tablet, Take 20 mg by mouth as needed Pt reports calls her [facility] cardiologist Dr Michel before taking , Disp: , Rfl:     gabapentin (NEURONTIN) 600 MG tablet, Take 600 mg by mouth 3 (three) times a day, Disp: , Rfl:     hydrOXYzine HCL (ATARAX) 25 mg tablet, TAKE 2 TABLETS (50 MG TOTAL) BY MOUTH DAILY AT BEDTIME AS NEEDED (INSOMNIA), Disp: 30 tablet, Rfl: 0    lamoTRIgine (LaMICtal) 200 MG tablet, Take 200 mg by mouth daily., Disp: , Rfl:     LORazepam (ATIVAN) 0.5 mg tablet, Take 0.5 mg by mouth daily as needed, Disp: , Rfl:     meclizine (ANTIVERT) 12.5 MG tablet, Take 1 tablet (12.5 mg total) by mouth every 8 (eight) hours as needed for dizziness for up to 7 days, Disp: 21 tablet, Rfl: 0    methocarbamol (ROBAXIN) 750 mg tablet, , Disp: , Rfl:     nystatin (MYCOSTATIN) cream, APPLY TO AFFECTED AREA TWICE A DAY, Disp: 30 g, Rfl: 0    ondansetron (ZOFRAN-ODT) 4 mg disintegrating tablet, TAKE 1 TABLET BY MOUTH EVERY 8 HOURS AS NEEDED FOR NAUSEA AND VOMITING, Disp: , Rfl:     potassium chloride (K-DUR,KLOR-CON) 20 mEq tablet, Take 20 mEq by mouth daily  , Disp: , Rfl:     Ubrelvy 100 MG tablet, TAKE 1 TABLET TWICE A DAY AS NEEDED FOR HEADACHE, LIMIT 16 TABS PER MONTH, Disp: , Rfl:     Allergies   Allergen Reactions    Ketorolac Itching and Other (See Comments)     [toradol] Itching, hives    Mushroom Extract Complex - Food Allergy Hives    Oxycodone Other (See Comments)     Irritable ,severe mood change        Physical Exam:   Vitals:    01/12/24 1339   BP: 152/79   Pulse: 95   Resp: 18   Temp: 98.3 °F (36.8 °C)   SpO2: 98%     General: Awake, Alert, Oriented x 3, Mood and affect appropriate  Respiratory: Respirations even and unlabored  Cardiovascular: Peripheral pulses intact; no edema  Musculoskeletal Exam: bilateral lower back pain    ASA Score: 3    Patient/Chart Verification  Patient ID Verified: Verbal  ID Band Applied: No  Consents Confirmed: Procedural  H&P( within 30 days) Verified: To be obtained in the  Pre-Procedure area  Interval H&P(within 24 hr) Complete (required for Outpatients and Surgery Admit only): To be obtained in the Pre-Procedure area  Allergies Reviewed: Yes  Anticoag/NSAID held?: NA  Currently on antibiotics?: No  Pregnancy denied?: NA    Assessment:   1. Lumbar spondylosis        Plan: (B) L3-5 MBB#1 (only one block needed, prior successful RFA [facility])

## 2024-01-12 NOTE — DISCHARGE INSTRUCTIONS

## 2024-01-16 ENCOUNTER — APPOINTMENT (OUTPATIENT)
Dept: PHYSICAL THERAPY | Facility: CLINIC | Age: 64
End: 2024-01-16
Payer: COMMERCIAL

## 2024-01-17 ENCOUNTER — PATIENT OUTREACH (OUTPATIENT)
Dept: FAMILY MEDICINE CLINIC | Facility: CLINIC | Age: 64
End: 2024-01-17

## 2024-01-18 ENCOUNTER — APPOINTMENT (OUTPATIENT)
Dept: PHYSICAL THERAPY | Facility: CLINIC | Age: 64
End: 2024-01-18
Payer: COMMERCIAL

## 2024-01-23 ENCOUNTER — APPOINTMENT (OUTPATIENT)
Dept: PHYSICAL THERAPY | Facility: CLINIC | Age: 64
End: 2024-01-23
Payer: COMMERCIAL

## 2024-01-24 ENCOUNTER — PATIENT OUTREACH (OUTPATIENT)
Dept: FAMILY MEDICINE CLINIC | Facility: CLINIC | Age: 64
End: 2024-01-24

## 2024-01-24 NOTE — LETTER
Date: 01/24/24    Dear Li Torre,   My name is Kaylin; I am a registered nurse care manager working with [facility]     I have not been able to reach you and would like to set a time that I can talk with you over the phone.  My work is to help patients that have complex medical conditions get the care they need. This includes patients who may have been in the hospital or emergency room.    I have enclosed my contact information below for you. Please call me with any questions you may have. I look forward to hearing from you.  Sincerely,  Kaylin Olivas RN  [phone]  Outpatient Care Manager

## 2024-01-25 ENCOUNTER — APPOINTMENT (OUTPATIENT)
Dept: PHYSICAL THERAPY | Facility: CLINIC | Age: 64
End: 2024-01-25
Payer: COMMERCIAL

## 2024-01-26 ENCOUNTER — DOCUMENTATION (OUTPATIENT)
Dept: HEMATOLOGY ONCOLOGY | Facility: CLINIC | Age: 64
End: 2024-01-26

## 2024-01-30 ENCOUNTER — TELEPHONE (OUTPATIENT)
Dept: HEMATOLOGY ONCOLOGY | Facility: CLINIC | Age: 64
End: 2024-01-30

## 2024-01-30 ENCOUNTER — OFFICE VISIT (OUTPATIENT)
Dept: PHYSICAL THERAPY | Facility: CLINIC | Age: 64
End: 2024-01-30
Payer: COMMERCIAL

## 2024-01-30 DIAGNOSIS — R26.2 AMBULATORY DYSFUNCTION: Primary | ICD-10-CM

## 2024-01-30 DIAGNOSIS — R29.898 WEAKNESS OF BOTH LOWER EXTREMITIES: ICD-10-CM

## 2024-01-30 PROCEDURE — 97110 THERAPEUTIC EXERCISES: CPT

## 2024-01-30 PROCEDURE — 97112 NEUROMUSCULAR REEDUCATION: CPT

## 2024-01-30 NOTE — PROGRESS NOTES
"Daily Note     Today's date: 2024  Patient name: Li Torre  : 1960  MRN: [id number]  Referring provider: Derrick Vasquez MD  Dx:   Encounter Diagnosis     ICD-10-CM    1. Ambulatory dysfunction  R26.2       2. Weakness of both lower extremities  R29.898           Start Time: 0955  Stop Time: 1025  Total time in clinic (min): 30 minutes    Subjective: Pt states she is feeling ok today, states she can tell the legs are weak.       Objective: See treatment diary below      Assessment: Tolerated treatment well. Patient demonstrated fatigue post treatment and would benefit from continued PT. Pt performed exercises as noted with no signs of increased pain or adverse symptoms. Pt needed minimal rest breaks throughout today's session. Pt will benefit from further skilled PT to increase strength, flexibility and function.       Plan: Continue per plan of care.      Precautions: S/p R reverse TSA on 10/24/23    Date            Visit # 3 4           FOTO IE             Re-eval IE              Manuals                                                                Neuro Re-Ed            clamshells 20x3\" GTB ea 20x3\" GTB ea           marching  30x            bridges 15x5\" 15x5\"                                                               Ther Ex            bike  5'           SLR 10x ea w QS 2x10 ea  w QS           S/l hip abd  15x           Standing hip abd/ext 10x ea 2x10 ea           sidestepping  PTB 3 laps           Leg press                                       Ther Activity            squats  2x10           Step ups  nv           Gait Training            hurdles  nv                        Modalities                                           "

## 2024-01-30 NOTE — TELEPHONE ENCOUNTER
Appointment Change  Cancel, Reschedule, Change to Virtual      Who are you speaking with? Patient   If it is not the patient, is the caller listed on the communication consent form? N/A   Which provider is the appointment scheduled with? MOON Marshall   When was the original appointment scheduled?    Please list date and time                  02/22/2024 @ 10:30AM    At which location is the appointment scheduled to take place? [place]   Was the appointment rescheduled?     Was the appointment changed from an in person visit to a virtual visit?    If so, please list the details of the change.                  Yes, 03/11/2024 @ 3PM    What is the reason for the appointment change? Patient requested same day appointment as her .

## 2024-01-31 ENCOUNTER — PATIENT OUTREACH (OUTPATIENT)
Dept: HEMATOLOGY ONCOLOGY | Facility: CLINIC | Age: 64
End: 2024-01-31

## 2024-01-31 NOTE — PROGRESS NOTES
Called PT to inform her that the application was secured from the HCP and faxed to the company on 1/31/24.   Lyndsey Arroyo, MPH  Phone:[phone]  Email: [email or web address]

## 2024-02-01 ENCOUNTER — APPOINTMENT (OUTPATIENT)
Dept: PHYSICAL THERAPY | Facility: CLINIC | Age: 64
End: 2024-02-01
Payer: COMMERCIAL

## 2024-02-06 ENCOUNTER — DOCUMENTATION (OUTPATIENT)
Dept: HEMATOLOGY ONCOLOGY | Facility: CLINIC | Age: 64
End: 2024-02-06

## 2024-02-06 ENCOUNTER — APPOINTMENT (OUTPATIENT)
Dept: PHYSICAL THERAPY | Facility: CLINIC | Age: 64
End: 2024-02-06
Payer: COMMERCIAL

## 2024-02-06 ENCOUNTER — TELEPHONE (OUTPATIENT)
Dept: HEMATOLOGY ONCOLOGY | Facility: CLINIC | Age: 64
End: 2024-02-06

## 2024-02-06 ENCOUNTER — PATIENT OUTREACH (OUTPATIENT)
Dept: HEMATOLOGY ONCOLOGY | Facility: CLINIC | Age: 64
End: 2024-02-06

## 2024-02-06 NOTE — PROGRESS NOTES
Rick called from  to inform this writer that this application is under review and will have a processing verdict within 72 hours.           Blanca Arroyo MPH  Phone:[phone]  Email: [email or web address]

## 2024-02-06 NOTE — TELEPHONE ENCOUNTER
Received voicemail from pt acknowledging she would like samples. Requested call back to let me know which office is most convenient for .

## 2024-02-06 NOTE — PROGRESS NOTES
PT called to inquire about how to obtain medication in the interim of drug program. This writer coordinated with Irena MONIKA to obtain PT and her  Mayur Torre samples (both on Eliquis, almost out of medication, and applying for the BMS program). Additionally, the program required insurance cards. As of 1/1/24 both PT's updated their insurances and did not have updated cards in their chart. Today Mayur dropped off both of their cards at the [facility] and copies were made into their charts. These copies were faxed to [facility] at [phone]. At this time, the PT's samples are being coordinated, so they should have ample medication until the program is effective, and BMS does not require any further documentation.         Blanca Arroyo, MPH  Phone:[phone]  Email: [email or web address]

## 2024-02-07 ENCOUNTER — TELEPHONE (OUTPATIENT)
Dept: HEMATOLOGY ONCOLOGY | Facility: CLINIC | Age: 64
End: 2024-02-07

## 2024-02-07 ENCOUNTER — DOCUMENTATION (OUTPATIENT)
Dept: HEMATOLOGY ONCOLOGY | Facility: CLINIC | Age: 64
End: 2024-02-07

## 2024-02-07 NOTE — PROGRESS NOTES
Received fax indicated that PT was denied from [facility] support program.       Blanca Arroyo MPH  Phone:[phone]  Email: [email or web address]

## 2024-02-07 NOTE — TELEPHONE ENCOUNTER
Patient Call    Who are you speaking with? Patient    If it is not the patient, are they listed on an active communication consent form? Yes   What is the reason for this call? Samples for caller and  should go to [address]   Does this require a call back? Yes, to let caller know it is ok to drive over   If a call back is required, please list best call back number [phone]    If a call back is required, advise that a message will be forwarded to their care team and someone will return their call as soon as possible.   Did you relay this information to the patient? Yes

## 2024-02-08 ENCOUNTER — TELEPHONE (OUTPATIENT)
Dept: RADIOLOGY | Facility: MEDICAL CENTER | Age: 64
End: 2024-02-08

## 2024-02-08 ENCOUNTER — OFFICE VISIT (OUTPATIENT)
Dept: PHYSICAL THERAPY | Facility: CLINIC | Age: 64
End: 2024-02-08
Payer: COMMERCIAL

## 2024-02-08 DIAGNOSIS — R29.898 WEAKNESS OF BOTH LOWER EXTREMITIES: ICD-10-CM

## 2024-02-08 DIAGNOSIS — R26.2 AMBULATORY DYSFUNCTION: Primary | ICD-10-CM

## 2024-02-08 PROCEDURE — 97110 THERAPEUTIC EXERCISES: CPT

## 2024-02-08 PROCEDURE — 97112 NEUROMUSCULAR REEDUCATION: CPT

## 2024-02-08 NOTE — PROGRESS NOTES
"Daily Note     Today's date: 2024  Patient name: Li Torre  : 1960  MRN: [id number]  Referring provider: Derrick Vasquez MD  Dx:   Encounter Diagnosis     ICD-10-CM    1. Ambulatory dysfunction  R26.2       2. Weakness of both lower extremities  R29.898           Start Time: 1025  Stop Time: 1100  Total time in clinic (min): 35 minutes    Subjective: Pt stated that she has been going through insurance issues over the past couple weeks is why she has been spotty with attendance. Pt stated that her legs are feeling a little weak today but overall has been doing well with performance of some of the exercises at home.       Objective: See treatment diary below      Assessment: Pt tolerated warm up on bike well at beginning of session without increase in discomfort during or after activity. Pt was challenged appropriately with addition of step up activity, trial progression of activity to step up and over next visit. Pt required mild verbal cueing throughout session to perform activities with proper form. Pt was had mild fatigue post performance of SLR w quad set in supine but no increase in discomfort, continue to progress as tolerated. Pt was challenged with addition of leg press activity today with moderate fatigue post session. Pt would benefit from continued skilled PT to improve BLE strength, mobility, flexibility, balance, gait, and functional ability.     Plan: Continue per plan of care.  Progress treatment as tolerated.       Precautions: S/p R reverse TSA on 10/24/23    Date           Visit # 3 4 5          FOTO IE             Re-eval IE              Manuals                                                               Neuro Re-Ed           clamshells 20x3\" GTB ea 20x3\" GTB ea 2x10 3\" GTB ea          marching  30x  20x ea          bridges 15x5\" 15x5\" 2x10 5\"                                                              Ther Ex           bike  " 5' 6'          SLR 10x ea w QS 2x10 ea  w QS 2x10 ea w QS          S/l hip abd  15x 2x10 ea          Standing hip abd/ext 10x ea 2x10 ea 2x10 ea          sidestepping  PTB 3 laps 3 laps PTB          Leg press   2x10 125# BLE                                    Ther Activity 1/9 1/30 2/8          squats  2x10 2x10          Step ups  nv 2x10 L up 1R          Gait Training 1/9 1/30           hurdles  nv                        Modalities 1/9 1/30

## 2024-02-08 NOTE — TELEPHONE ENCOUNTER
Left message for patient to call me back DIRECTLY to schedule.  Left my DIRECT phone # 2x on message.

## 2024-02-09 NOTE — TELEPHONE ENCOUNTER
Caller: jaya    Doctor: brendon    Reason for call: pt is calling back to get scheduled.    Call back#: [phone]

## 2024-02-09 NOTE — TELEPHONE ENCOUNTER
Procedures scheduled 2/28 and 3/13/24.    Reviewed instructions: , NPO 1 hour prior, loose-fitting/comfortable clothing, if ill/fever/infx/abx to call and reschedule.  No need to hold any meds prior.  Patient stated verbal understanding.

## 2024-02-12 ENCOUNTER — PATIENT OUTREACH (OUTPATIENT)
Dept: HEMATOLOGY ONCOLOGY | Facility: CLINIC | Age: 64
End: 2024-02-12

## 2024-02-12 NOTE — PROGRESS NOTES
Called PT to inform her that her and her  were denied from the [facility] PAF for Eliquis due to income. PT did not answer. VM was left with information and this writer's contact information should she have questions.          Blanca Arroyo MPH  Phone:[phone]  Email: [email or web address]

## 2024-02-13 ENCOUNTER — APPOINTMENT (OUTPATIENT)
Dept: PHYSICAL THERAPY | Facility: CLINIC | Age: 64
End: 2024-02-13
Payer: COMMERCIAL

## 2024-02-15 ENCOUNTER — APPOINTMENT (OUTPATIENT)
Dept: PHYSICAL THERAPY | Facility: CLINIC | Age: 64
End: 2024-02-15
Payer: COMMERCIAL

## 2024-02-20 ENCOUNTER — OFFICE VISIT (OUTPATIENT)
Dept: PHYSICAL THERAPY | Facility: CLINIC | Age: 64
End: 2024-02-20
Payer: COMMERCIAL

## 2024-02-20 ENCOUNTER — DOCUMENTATION (OUTPATIENT)
Dept: CASE MANAGEMENT | Facility: OTHER | Age: 64
End: 2024-02-20

## 2024-02-20 DIAGNOSIS — R29.898 WEAKNESS OF BOTH LOWER EXTREMITIES: ICD-10-CM

## 2024-02-20 DIAGNOSIS — R26.2 AMBULATORY DYSFUNCTION: Primary | ICD-10-CM

## 2024-02-20 PROCEDURE — 97110 THERAPEUTIC EXERCISES: CPT

## 2024-02-20 PROCEDURE — 97112 NEUROMUSCULAR REEDUCATION: CPT

## 2024-02-20 NOTE — MEDICAL HIGH UTILIZER
High Utilizer Care Plan for: Li Torre  Patient Name: Li Torre          MRN: [id number]       : 1960 Age: 63      Sex:  F   Utilization Background: She is a female with a history of migraine, [place]'s disease, Bipolar, Depression, Fibromyalgia, and HTN who presents to [facility] and [facility] with migraine. She has 14 ER visits, 9 admission, and 2 transfers to [facility] for Ketamine Infusions in 2019. Discussed with Neurology reveals that patient does not feel much better even after prolonged hospitalization.       In the last 90 days: 0 encounters   Treatment Recommendations:  ED Recommendations: Recommendations as per neurology: Give migraine protocol: using steroid protocol d/t toradol allergy. Recommend calling her [facility] Neurologist to schedule close outpatient follow up. It is noted that the patient will provide other complaints to the ER providers to get admitted and then will complain of migraine in the hospital.     Would not offer transfer for Ketamine Infusion to this patient as it has not worked in the past. This should be left up to Neurology to determine if this is appropriate.       Inpatient Recommendations: Early consultation with neurology. Avoid narcotics/sedating agents due to over sedation in the past       Outpatient recommendations: Needs close follow up with [facility]. Needs CM to make sure that patient does not run out of her meds as she has in the past.      Situation: Patient who presents frequently for migraines. It seems that she has started using other chief complaints to get admitted to the hospital for migraine treatment. As per neurology colleague her headache symptomatology does not usually change with treatment      PMH/PSH:  Migraine, Depression, Bipolar, Fibromyalgia, HTN, Hx of DVT      Assessment                              Drivers of repeated utilization:                 Symptomatology     Community Resources in place:    None - she  has mentioned that she does not have a  for infusions  May need assistance with transportation and with medications   Patient Care Team:   Derrick Vasquez MD - PCP ([facility])  MOON Calle - Psych  MOON Hernandez/Luigi Jones MD - Neurology ([facility])  Altagracia Jade MD - Hematology ([facility])  OP Care Manager: Fredrick Jo RN              Care plan date and owner: Terry Duckworth Jr., PA-C 10/31/2019         Reviewed with patient before discharge?

## 2024-02-20 NOTE — PROGRESS NOTES
"Daily Note     Today's date: 2024  Patient name: Li Torre  : 1960  MRN: [id number]  Referring provider: Derrick Vasquez MD  Dx:   Encounter Diagnosis     ICD-10-CM    1. Ambulatory dysfunction  R26.2       2. Weakness of both lower extremities  R29.898           Start Time: 1430  Stop Time: 1510  Total time in clinic (min): 40 minutes    Subjective: Pt stated that she has a lot of pain today and has been out of wack ever since shoveling snow over the past week so has not been doing as much as she is used to.  Pt stated that both of her shoulders also feel weak and would like to try to get another script to work on her BUE strength.       Objective: See treatment diary below      Assessment: Pt tolerated warm up on bike well at beginning of session without increase in discomfort during or after activity. Pt was challenged with addition of OTB during standing BLE strengthening activities with mild fatigue and discomfort. Pt had pain in her back with performance of leg press and standing marches that decreased with seated rest break. Pt also was challenged with supine and sidelying strengthening activities due to L/S discomfort, but showed weakness in b/l abduction strength and function. Pt required moderate verbal cueing and demonstration to perform monster walks with proper form. Pt would benefit from continued skilled PT to improve BLE strength, mobility, balance, gait, and functional ability.       Plan: Continue per plan of care.  Progress treatment as tolerated.       Precautions: S/p R reverse TSA on 10/24/23    Date          Visit # 3 4 5 6         FOTO IE   done          Re-eval IE              Manuals                                                              Neuro Re-Ed          clamshells 20x3\" GTB ea 20x3\" GTB ea 2x10 3\" GTB ea 2x10 3\" GTB ea         marching  30x  20x ea 20x ea OTB stand         bridges 15x5\" 15x5\" 2x10 5\" 2x10 5\" "                                                             Ther Ex 1/9 1/30 2/8 2/20         bike  5' 6' 6'         SLR 10x ea w QS 2x10 ea  w QS 2x10 ea w QS 2x10 ea w QS         S/l hip abd  15x 2x10 ea 2x10 ea         Standing hip abd/ext 10x ea 2x10 ea 2x10 ea 2x10 ea OTB         sidestepping  PTB 3 laps 3 laps PTB          Leg press   2x10 125# BLE 2x15 135# BLE         Monster walks    2 laps GTB                      Ther Activity 1/9 1/30 2/8 2/20         squats  2x10 2x10 2x10         Step ups  nv 2x10 L up 1R 2x10 ea up 1R         Gait Training 1/9 1/30           hurdles  nv                        Modalities 1/9 1/30

## 2024-02-22 ENCOUNTER — OFFICE VISIT (OUTPATIENT)
Dept: PHYSICAL THERAPY | Facility: CLINIC | Age: 64
End: 2024-02-22
Payer: COMMERCIAL

## 2024-02-22 ENCOUNTER — TELEPHONE (OUTPATIENT)
Dept: HEMATOLOGY ONCOLOGY | Facility: CLINIC | Age: 64
End: 2024-02-22

## 2024-02-22 DIAGNOSIS — R29.898 WEAKNESS OF BOTH LOWER EXTREMITIES: ICD-10-CM

## 2024-02-22 DIAGNOSIS — R26.2 AMBULATORY DYSFUNCTION: Primary | ICD-10-CM

## 2024-02-22 PROCEDURE — 97112 NEUROMUSCULAR REEDUCATION: CPT

## 2024-02-22 PROCEDURE — 97110 THERAPEUTIC EXERCISES: CPT

## 2024-02-22 NOTE — PROGRESS NOTES
"Daily Note     Today's date: 2024  Patient name: Li Torre  : 1960  MRN: [id number]  Referring provider: Derrick Vasquez MD  Dx:   Encounter Diagnosis     ICD-10-CM    1. Ambulatory dysfunction  R26.2       2. Weakness of both lower extremities  R29.898           Start Time: 1000  Stop Time: 1040  Total time in clinic (min): 40 minutes    Subjective: Pt stated that she continues to have LBP which limits what she can do. Pt states the walking is about the same as normal.        Objective: See treatment diary below      Assessment: Pt tolerated warm up on bike well at beginning of session without increase in discomfort during or after activity. Pt continues to show increased difficulty with standing exercises requiring several rest breaks.  Pt would benefit from continued skilled PT to improve BLE strength, mobility, balance, gait, and functional ability.       Plan: Continue per plan of care.  Progress treatment as tolerated.       Precautions: S/p R reverse TSA on 10/24/23    Date         Visit # 3 4 5 6 7        FOTO IE   done          Re-eval IE              Manuals                                                             Neuro Re-Ed         clamshells 20x3\" GTB ea 20x3\" GTB ea 2x10 3\" GTB ea 2x10 3\" GTB ea 2x10 3\" GTB ea        marching  30x  20x ea 20x ea OTB stand 2x10 ea PTB        bridges 15x5\" 15x5\" 2x10 5\" 2x10 5\" 2x10 5\"                                                            Ther Ex         bike  5' 6' 6' 8'        SLR 10x ea w QS 2x10 ea  w QS 2x10 ea w QS 2x10 ea w QS 2x10 ea w QS        S/l hip abd  15x 2x10 ea 2x10 ea 2x10 ea        Standing hip abd/ext 10x ea 2x10 ea 2x10 ea 2x10 ea OTB 2x10 ea PTB        sidestepping  PTB 3 laps 3 laps PTB          Leg press   2x10 125# BLE 2x15 135# BLE 2x15 135# BLE        Monster walks    2 laps GTB np                     Ther Activity  " 2/20 2/22        squats  2x10 2x10 2x10 2x10        Step ups  nv 2x10 L up 1R 2x10 ea up 1R 2x10 ea up 1R        Gait Training 1/9 1/30           hurdles  nv                        Modalities 1/9 1/30

## 2024-02-22 NOTE — TELEPHONE ENCOUNTER
"Received call from patient, \"Sita Gallagher, this is Li Blunt. My YOB: 1960. I just was returning your call. If you would call me back would be great at [phone]. Thank you.\"    Left  with RN Call back number.  "

## 2024-02-23 ENCOUNTER — TELEPHONE (OUTPATIENT)
Dept: PAIN MEDICINE | Facility: MEDICAL CENTER | Age: 64
End: 2024-02-23

## 2024-02-23 ENCOUNTER — HOSPITAL ENCOUNTER (OUTPATIENT)
Dept: RADIOLOGY | Facility: MEDICAL CENTER | Age: 64
End: 2024-02-23
Payer: COMMERCIAL

## 2024-02-23 VITALS
SYSTOLIC BLOOD PRESSURE: 148 MMHG | TEMPERATURE: 97.6 F | OXYGEN SATURATION: 97 % | HEART RATE: 97 BPM | RESPIRATION RATE: 18 BRPM | DIASTOLIC BLOOD PRESSURE: 81 MMHG

## 2024-02-23 DIAGNOSIS — M47.816 LUMBAR SPONDYLOSIS: ICD-10-CM

## 2024-02-23 PROCEDURE — 64635 DESTROY LUMB/SAC FACET JNT: CPT | Performed by: PHYSICAL MEDICINE & REHABILITATION

## 2024-02-23 PROCEDURE — 64636 DESTROY L/S FACET JNT ADDL: CPT | Performed by: PHYSICAL MEDICINE & REHABILITATION

## 2024-02-23 RX ADMIN — Medication 5 ML: at 12:54

## 2024-02-23 NOTE — H&P
"History of Present Illness: The patient is a 63 y.o. female who presents with complaints of right low back pain    Past Medical History:   Diagnosis Date    Adrenal insufficiency (Andrés's disease) ([facility])     Anemia 09 15 2022    Was found in bloodwork done on that day    Anxiety     Arthritis     Bilateral pulmonary contusion 07/03/2020    Bipolar disorder     Cervical radiculopathy     Chest pain     seen in ER 9/18, dx with gas    Chronic back pain     Chronic pain disorder     lumbar    Closed head injury with brief loss of consciousness ([facility]) 10/15/2019    Contusion of right hand 07/03/2021    Depression     DVT, lower extremity ([facility])     1991 and 2019 now on eliquis    Exercises 5 to 6 times per week     5 days per week with weights/band and also goes to PT 2x/week    Fall down stairs 07/03/2020    Fibromyalgia     Fibromyalgia, primary     GERD (gastroesophageal reflux disease)     Headache(784.0) 2018    I get migraines. See Neurologist for this.    Hematoma of parietal scalp 07/03/2020    History of Clostridioides difficile infection     History of MRSA infection     pt denies having this    History of recent fall 07/2021    \"possibly injured left shoulder'    History of TIAs     cannot remember details    Hypertension     Hypokalemia     Intractable migraine without aura and without status migrainosus 10/02/2019    Left shoulder pain     \"not too bad\" goes to PT 2x/week    Migraine     Obesity 2019    Osteoporosis     Psychiatric disorder     Anxiety, major depression, bipolar    Spinal stenosis     Syncope 2014    orthostatic hypotension    Wears dentures     upper    Wears glasses        Past Surgical History:   Procedure Laterality Date    APPENDECTOMY      CAST APPLICATION Left 07/04/2020    Procedure: Application short-arm splint;  Surgeon: Sony Zhang MD;  Location: [facility];  Service: Orthopedics    COLONOSCOPY      FL INJECTION LEFT SHOULDER (ARTHROGRAM)  11/10/2021    GASTRIC RESTRICTION " "SURGERY      Gastric Sleeve Nov 2015    HIP SURGERY  2016    Hip Replacement    HYSTERECTOMY      DONALD    JOINT REPLACEMENT      Left Knee 2011 and Right Hip 2010    KNEE SURGERY  2015    Knee Replacement    OOPHORECTOMY      ORIF WRIST FRACTURE Left 07/04/2020    Procedure: Open reduction and internal fixation left radius and ulnar shaft fracture;  Surgeon: Sony Zhang MD;  Location: [facility];  Service: Orthopedics    DE ARTHROPLASTY GLENOHUMERAL JOINT TOTAL SHOULDER Left 03/30/2021    Procedure: ARTHROPLASTY SHOULDER - anatomic;  Surgeon: Daryn Daniel MD;  Location: [facility];  Service: Orthopedics    DE ARTHROPLASTY GLENOHUMERAL JOINT TOTAL SHOULDER Right 10/24/2023    Procedure: ANATOMIC VS REVERSE TOTAL SHOULDER ARTHROPLASTY, WITH BICEPS TENDONESIS;  Surgeon: Daryn Daniel MD;  Location: [facility];  Service: Orthopedics    DE NATIVIDAD SHOULDER ARTHRPLSTY HUMERAL&GLENOID COMPNT Left 12/21/2021    Procedure: REVISION OF TOTAL SHOULDER ARTHROPLASTY TO REVERSE TOTAL SHOULDER ARTHROPLASTY;  Surgeon: Daryn Daniel MD;  Location: [facility];  Service: Orthopedics         Current Outpatient Medications:     amLODIPine (NORVASC) 5 mg tablet, Take 5 mg by mouth daily, Disp: , Rfl:     apixaban (Eliquis) 5 mg, Take 1 tablet (5 mg total) by mouth 2 (two) times a day, Disp: 180 tablet, Rfl: 3    ARIPiprazole (ABILIFY) 5 mg tablet, TAKE 1 TABLET EVERY MORNING (CORRECTION BY DR OF PREV SCRIPT), Disp: , Rfl:     atorvastatin (LIPITOR) 40 mg tablet, TAKE 1 TABLET BY MOUTH EVERY DAY AT NIGHT, Disp: , Rfl:     B-D INSULIN SYRINGE 1CC/25GX1\" 25G X 1\" 1 ML MISC, , Disp: , Rfl:     celecoxib (CeleBREX) 200 mg capsule, TAKE 1 CAPSULE BY MOUTH EVERY DAY, Disp: 30 capsule, Rfl: 1    cholecalciferol (VITAMIN D3) 1,000 units tablet, , Disp: , Rfl:     dihydroergotamine (DHE) 1 mg/mL, INJECT 1 ML (1 MG TOTAL) INJECT INTO THE MUSCLE AS NEEDED FOR MIGRAINE., Disp: , Rfl:     furosemide (LASIX) 20 mg tablet, " Take 20 mg by mouth as needed Pt reports calls her [facility] cardiologist Dr Michel before taking , Disp: , Rfl:     gabapentin (NEURONTIN) 600 MG tablet, Take 600 mg by mouth 3 (three) times a day, Disp: , Rfl:     hydrOXYzine HCL (ATARAX) 25 mg tablet, TAKE 2 TABLETS (50 MG TOTAL) BY MOUTH DAILY AT BEDTIME AS NEEDED (INSOMNIA), Disp: 30 tablet, Rfl: 0    lamoTRIgine (LaMICtal) 200 MG tablet, Take 200 mg by mouth daily., Disp: , Rfl:     LORazepam (ATIVAN) 0.5 mg tablet, Take 0.5 mg by mouth daily as needed, Disp: , Rfl:     meclizine (ANTIVERT) 12.5 MG tablet, Take 1 tablet (12.5 mg total) by mouth every 8 (eight) hours as needed for dizziness for up to 7 days, Disp: 21 tablet, Rfl: 0    methocarbamol (ROBAXIN) 750 mg tablet, , Disp: , Rfl:     nystatin (MYCOSTATIN) cream, APPLY TO AFFECTED AREA TWICE A DAY, Disp: 30 g, Rfl: 0    ondansetron (ZOFRAN-ODT) 4 mg disintegrating tablet, TAKE 1 TABLET BY MOUTH EVERY 8 HOURS AS NEEDED FOR NAUSEA AND VOMITING, Disp: , Rfl:     potassium chloride (K-DUR,KLOR-CON) 20 mEq tablet, Take 20 mEq by mouth daily  , Disp: , Rfl:     Ubrelvy 100 MG tablet, TAKE 1 TABLET TWICE A DAY AS NEEDED FOR HEADACHE, LIMIT 16 TABS PER MONTH, Disp: , Rfl:   No current facility-administered medications for this encounter.    Allergies   Allergen Reactions    Ketorolac Itching and Other (See Comments)     [toradol] Itching, hives    Mushroom Extract Complex - Food Allergy Hives    Oxycodone Other (See Comments)     Irritable ,severe mood change        Physical Exam:   Vitals:    02/23/24 1241   BP: 142/84   Pulse: 92   Resp: 18   Temp: 97.6 °F (36.4 °C)   SpO2: 99%     General: Awake, Alert, Oriented x 3, Mood and affect appropriate  Respiratory: Respirations even and unlabored  Cardiovascular: Peripheral pulses intact; no edema  Musculoskeletal Exam: right low back pain    ASA Score: 3    Patient/Chart Verification  Patient ID Verified: Verbal  Consents Confirmed: Procedural, To be obtained in the  Pre-Procedure area  H&P( within 30 days) Verified: To be obtained in the Pre-Procedure area  Allergies Reviewed: Yes  Anticoag/NSAID held?: NA  Currently on antibiotics?: No  Pregnancy denied?: NA    Assessment:   1. Lumbar spondylosis        Plan: RT L3-5 RFA ([id number], [id number])

## 2024-02-23 NOTE — DISCHARGE INSTRUCTIONS

## 2024-02-26 NOTE — TELEPHONE ENCOUNTER
FYI-    Pt did well over night no s/s of infection or sunburn sensation.  Aware it takes 2 weeks to notice pain relief and 4-6 weeks for full pain effect to be achieved.  Pain rating as of today-Denies     Aware to medicate as previous for discomfort and may use ice or heat.  Confirmed next appt.  Call if any questions or concerns prior to next appt.

## 2024-02-27 ENCOUNTER — OFFICE VISIT (OUTPATIENT)
Dept: PHYSICAL THERAPY | Facility: CLINIC | Age: 64
End: 2024-02-27
Payer: COMMERCIAL

## 2024-02-27 DIAGNOSIS — M16.12 PRIMARY OSTEOARTHRITIS OF LEFT HIP: ICD-10-CM

## 2024-02-27 DIAGNOSIS — R29.898 WEAKNESS OF BOTH LOWER EXTREMITIES: ICD-10-CM

## 2024-02-27 DIAGNOSIS — R26.2 AMBULATORY DYSFUNCTION: Primary | ICD-10-CM

## 2024-02-27 PROCEDURE — 97112 NEUROMUSCULAR REEDUCATION: CPT

## 2024-02-27 PROCEDURE — 97110 THERAPEUTIC EXERCISES: CPT

## 2024-02-27 RX ORDER — CELECOXIB 200 MG/1
200 CAPSULE ORAL DAILY
Qty: 30 CAPSULE | Refills: 1 | Status: SHIPPED | OUTPATIENT
Start: 2024-02-27

## 2024-02-27 NOTE — PROGRESS NOTES
"Daily Note     Today's date: 2024  Patient name: Li Torre  : 1960  MRN: [id number]  Referring provider: Derrick Vasquez MD  Dx:   Encounter Diagnosis     ICD-10-CM    1. Ambulatory dysfunction  R26.2       2. Weakness of both lower extremities  R29.898           Start Time: 0955  Stop Time: 1035  Total time in clinic (min): 40 minutes    Subjective: Pt states she is feeling much better since her injections last week. Pt states she was able to go for a walk with her  yesterday. Pt states she went for about .25 miles.        Objective: See treatment diary below      Assessment: Pt tolerated warm up on bike well at beginning of session without increase in discomfort during or after activity. Pt shows overall muscle fatigue with exercise session.  Pt would benefit from continued skilled PT to improve BLE strength, mobility, balance, gait, and functional ability. Continue to progress as able.       Plan: Continue per plan of care.  Progress treatment as tolerated.       Precautions: S/p R reverse TSA on 10/24/23    Date        Visit # 3 4 5 6 7 8       FOTO IE   done          Re-eval IE              Manuals                                                            Neuro Re-Ed        clamshells 20x3\" GTB ea 20x3\" GTB ea 2x10 3\" GTB ea 2x10 3\" GTB ea 2x10 3\" GTB ea 2x10 3\" GTB ea       marching  30x  20x ea 20x ea OTB stand 2x10 ea PTB 2x10 ea PTB       bridges 15x5\" 15x5\" 2x10 5\" 2x10 5\" 2x10 5\" 2x10 5\" GTB                                                           Ther Ex        bike  5' 6' 6' 8' 8'       SLR 10x ea w QS 2x10 ea  w QS 2x10 ea w QS 2x10 ea w QS 2x10 ea w QS 2x10 ea w QS       S/l hip abd  15x 2x10 ea 2x10 ea 2x10 ea 2x10 ea       Standing hip abd/ext 10x ea 2x10 ea 2x10 ea 2x10 ea OTB 2x10 ea PTB 2x10 ea OTB       sidestepping  PTB 3 laps 3 laps PTB   3 laps PTB     "   Leg press   2x10 125# BLE 2x15 135# BLE 2x15 135# BLE 2x15 135# BLE       Monster walks    2 laps GTB np                     Ther Activity 1/9 1/30 2/8 2/20 2/22 2/27       squats  2x10 2x10 2x10 2x10 2x10       Step ups  nv 2x10 L up 1R 2x10 ea up 1R 2x10 ea up 1R 2x10 ea up 1R       Gait Training 1/9 1/30 2/27       hurdles  nv                        Modalities 1/9 1/30

## 2024-02-29 ENCOUNTER — APPOINTMENT (OUTPATIENT)
Dept: PHYSICAL THERAPY | Facility: CLINIC | Age: 64
End: 2024-02-29
Payer: COMMERCIAL

## 2024-03-01 ENCOUNTER — OFFICE VISIT (OUTPATIENT)
Dept: PHYSICAL THERAPY | Facility: CLINIC | Age: 64
End: 2024-03-01
Payer: COMMERCIAL

## 2024-03-01 DIAGNOSIS — R29.898 WEAKNESS OF BOTH LOWER EXTREMITIES: ICD-10-CM

## 2024-03-01 DIAGNOSIS — R26.2 AMBULATORY DYSFUNCTION: Primary | ICD-10-CM

## 2024-03-01 PROCEDURE — 97110 THERAPEUTIC EXERCISES: CPT | Performed by: PHYSICAL THERAPIST

## 2024-03-01 PROCEDURE — 97112 NEUROMUSCULAR REEDUCATION: CPT | Performed by: PHYSICAL THERAPIST

## 2024-03-01 NOTE — PROGRESS NOTES
"Daily Note     Today's date: 3/1/2024  Patient name: Li Torre  : 1960  MRN: [id number]  Referring provider: Derrick Vasquez MD  Dx:   Encounter Diagnosis     ICD-10-CM    1. Ambulatory dysfunction  R26.2       2. Weakness of both lower extremities  R29.898                      Subjective: Pt reports doing well today and no changes since last visit.      Objective: See treatment diary below      Assessment:  Pt progresses with SLR and s/l hip ABD with added ankle weight, but has difficulty on R LE with hip ABD.  She does well w increased reps on clams and marches.  Patient demonstrated fatigue post treatment, exhibited good technique with therapeutic exercises, and would benefit from continued PT      Plan: Continue per plan of care.  Progress treatment as tolerated.       Precautions: S/p R reverse TSA on 10/24/23    Date 1/9 1/30 2/8 2/20 2/22 2/27 3/1      Visit # 3 4 5 6 7 8 9      FOTO IE   done          Re-eval IE              Manuals 1/9 1/30 2/8 2/20 2/22 2/27 3/1                                                          Neuro Re-Ed 1/9 1/30 2/8 2/20 2/22 2/27 3/1      clamshells 20x3\" GTB ea 20x3\" GTB ea 2x10 3\" GTB ea 2x10 3\" GTB ea 2x10 3\" GTB ea 2x10 3\" GTB ea 2x15 GTB      marching  30x  20x ea 20x ea OTB stand 2x10 ea PTB 2x10 ea PTB 2x15 GTB      bridges 15x5\" 15x5\" 2x10 5\" 2x10 5\" 2x10 5\" 2x10 5\" GTB 2x10 5\" GTB                                                          Ther Ex 1/9 1/30 2/8 2/20 2/22 2/27 3/1      bike  5' 6' 6' 8' 8' 8'      SLR 10x ea w QS 2x10 ea  w QS 2x10 ea w QS 2x10 ea w QS 2x10 ea w QS 2x10 ea w QS 2x10 1#      S/l hip abd  15x 2x10 ea 2x10 ea 2x10 ea 2x10 ea 2x10 1#      Standing hip abd/ext 10x ea 2x10 ea 2x10 ea 2x10 ea OTB 2x10 ea PTB 2x10 ea OTB 2x10 ea PTB      sidestepping  PTB 3 laps 3 laps PTB   3 laps PTB 3 laps PTB      Leg press   2x10 125# BLE 2x15 135# BLE 2x15 135# BLE 2x15 135# BLE 2x15 135# BLE      Monster walks    2 laps GTB np                 "     Ther Activity 1/9 1/30 2/8 2/20 2/22 2/27 3/1      squats  2x10 2x10 2x10 2x10 2x10 2x10      Step ups  nv 2x10 L up 1R 2x10 ea up 1R 2x10 ea up 1R 2x10 ea up 1R 2x10 ea up 1R      Gait Training 1/9 1/30 2/27       hurdles  nv                        Modalities 1/9 1/30

## 2024-03-05 ENCOUNTER — APPOINTMENT (OUTPATIENT)
Dept: PHYSICAL THERAPY | Facility: CLINIC | Age: 64
End: 2024-03-05
Payer: COMMERCIAL

## 2024-03-06 ENCOUNTER — TELEPHONE (OUTPATIENT)
Dept: HEMATOLOGY ONCOLOGY | Facility: CLINIC | Age: 64
End: 2024-03-06

## 2024-03-06 NOTE — TELEPHONE ENCOUNTER
Called and LVM for patient to have labs collected prior to her appointment with MOON Heaton on 3/11/24.    Mentioned that the orders are already in the system and that as long as patient goes to a [facility] there is no paper work needed.     Also mentioned that if patient is unable to have labs collected to please give the office a call back as we may need to reschedule this appointment.

## 2024-03-07 ENCOUNTER — APPOINTMENT (OUTPATIENT)
Dept: PHYSICAL THERAPY | Facility: CLINIC | Age: 64
End: 2024-03-07
Payer: COMMERCIAL

## 2024-03-07 DIAGNOSIS — D50.8 IRON DEFICIENCY ANEMIA FOLLOWING BARIATRIC SURGERY: ICD-10-CM

## 2024-03-07 DIAGNOSIS — D75.839 THROMBOCYTOSIS: ICD-10-CM

## 2024-03-07 DIAGNOSIS — K95.89 IRON DEFICIENCY ANEMIA FOLLOWING BARIATRIC SURGERY: ICD-10-CM

## 2024-03-07 DIAGNOSIS — D64.9 NORMOCYTIC ANEMIA: ICD-10-CM

## 2024-03-07 DIAGNOSIS — D72.829 LEUKOCYTOSIS, UNSPECIFIED TYPE: ICD-10-CM

## 2024-03-07 DIAGNOSIS — E53.8 B12 DEFICIENCY: ICD-10-CM

## 2024-03-08 ENCOUNTER — TELEPHONE (OUTPATIENT)
Age: 64
End: 2024-03-08

## 2024-03-08 ENCOUNTER — APPOINTMENT (OUTPATIENT)
Dept: LAB | Facility: MEDICAL CENTER | Age: 64
End: 2024-03-08
Payer: COMMERCIAL

## 2024-03-08 ENCOUNTER — OFFICE VISIT (OUTPATIENT)
Dept: FAMILY MEDICINE CLINIC | Facility: CLINIC | Age: 64
End: 2024-03-08
Payer: COMMERCIAL

## 2024-03-08 VITALS
OXYGEN SATURATION: 98 % | WEIGHT: 244 LBS | SYSTOLIC BLOOD PRESSURE: 150 MMHG | HEART RATE: 97 BPM | BODY MASS INDEX: 36.98 KG/M2 | TEMPERATURE: 97.8 F | DIASTOLIC BLOOD PRESSURE: 90 MMHG | HEIGHT: 68 IN

## 2024-03-08 DIAGNOSIS — E27.1 ADRENAL INSUFFICIENCY (ADDISON'S DISEASE) (HCC): ICD-10-CM

## 2024-03-08 DIAGNOSIS — J01.81 OTHER ACUTE RECURRENT SINUSITIS: Primary | ICD-10-CM

## 2024-03-08 DIAGNOSIS — I10 PRIMARY HYPERTENSION: ICD-10-CM

## 2024-03-08 DIAGNOSIS — D75.839 THROMBOCYTOSIS: ICD-10-CM

## 2024-03-08 DIAGNOSIS — E66.01 SEVERE OBESITY (BMI 35.0-39.9) WITH COMORBIDITY (HCC): ICD-10-CM

## 2024-03-08 DIAGNOSIS — K95.89 IRON DEFICIENCY ANEMIA FOLLOWING BARIATRIC SURGERY: ICD-10-CM

## 2024-03-08 DIAGNOSIS — E53.8 B12 DEFICIENCY: ICD-10-CM

## 2024-03-08 DIAGNOSIS — D72.829 LEUKOCYTOSIS, UNSPECIFIED TYPE: ICD-10-CM

## 2024-03-08 DIAGNOSIS — D64.9 NORMOCYTIC ANEMIA: ICD-10-CM

## 2024-03-08 DIAGNOSIS — F31.9 BIPOLAR DEPRESSION (HCC): ICD-10-CM

## 2024-03-08 DIAGNOSIS — I26.99 PE (PULMONARY THROMBOEMBOLISM) (HCC): ICD-10-CM

## 2024-03-08 DIAGNOSIS — D50.8 IRON DEFICIENCY ANEMIA FOLLOWING BARIATRIC SURGERY: ICD-10-CM

## 2024-03-08 LAB
BASOPHILS # BLD AUTO: 0.05 THOUSANDS/ÂΜL (ref 0–0.1)
BASOPHILS NFR BLD AUTO: 1 % (ref 0–1)
EOSINOPHIL # BLD AUTO: 0.08 THOUSAND/ÂΜL (ref 0–0.61)
EOSINOPHIL NFR BLD AUTO: 1 % (ref 0–6)
ERYTHROCYTE [DISTWIDTH] IN BLOOD BY AUTOMATED COUNT: 15.7 % (ref 11.6–15.1)
HCT VFR BLD AUTO: 41.2 % (ref 34.8–46.1)
HGB BLD-MCNC: 13.6 G/DL (ref 11.5–15.4)
IMM GRANULOCYTES # BLD AUTO: 0.05 THOUSAND/UL (ref 0–0.2)
IMM GRANULOCYTES NFR BLD AUTO: 1 % (ref 0–2)
LYMPHOCYTES # BLD AUTO: 2.57 THOUSANDS/ÂΜL (ref 0.6–4.47)
LYMPHOCYTES NFR BLD AUTO: 29 % (ref 14–44)
MCH RBC QN AUTO: 29.5 PG (ref 26.8–34.3)
MCHC RBC AUTO-ENTMCNC: 33 G/DL (ref 31.4–37.4)
MCV RBC AUTO: 89 FL (ref 82–98)
MONOCYTES # BLD AUTO: 0.7 THOUSAND/ÂΜL (ref 0.17–1.22)
MONOCYTES NFR BLD AUTO: 8 % (ref 4–12)
NEUTROPHILS # BLD AUTO: 5.37 THOUSANDS/ÂΜL (ref 1.85–7.62)
NEUTS SEG NFR BLD AUTO: 60 % (ref 43–75)
NRBC BLD AUTO-RTO: 0 /100 WBCS
PLATELET # BLD AUTO: 352 THOUSANDS/UL (ref 149–390)
PMV BLD AUTO: 10 FL (ref 8.9–12.7)
RBC # BLD AUTO: 4.61 MILLION/UL (ref 3.81–5.12)
WBC # BLD AUTO: 8.82 THOUSAND/UL (ref 4.31–10.16)

## 2024-03-08 PROCEDURE — 83550 IRON BINDING TEST: CPT

## 2024-03-08 PROCEDURE — 82728 ASSAY OF FERRITIN: CPT

## 2024-03-08 PROCEDURE — 82607 VITAMIN B-12: CPT

## 2024-03-08 PROCEDURE — 82746 ASSAY OF FOLIC ACID SERUM: CPT

## 2024-03-08 PROCEDURE — G2211 COMPLEX E/M VISIT ADD ON: HCPCS | Performed by: FAMILY MEDICINE

## 2024-03-08 PROCEDURE — 85025 COMPLETE CBC W/AUTO DIFF WBC: CPT

## 2024-03-08 PROCEDURE — 83540 ASSAY OF IRON: CPT

## 2024-03-08 PROCEDURE — 36415 COLL VENOUS BLD VENIPUNCTURE: CPT

## 2024-03-08 PROCEDURE — 99214 OFFICE O/P EST MOD 30 MIN: CPT | Performed by: FAMILY MEDICINE

## 2024-03-08 RX ORDER — AMOXICILLIN 500 MG/1
500 TABLET, FILM COATED ORAL 3 TIMES DAILY
Qty: 21 TABLET | Refills: 0 | Status: SHIPPED | OUTPATIENT
Start: 2024-03-08 | End: 2024-03-15

## 2024-03-08 RX ORDER — LORAZEPAM 0.5 MG/1
0.5 TABLET ORAL DAILY PRN
Qty: 15 TABLET | Refills: 0 | Status: SHIPPED | OUTPATIENT
Start: 2024-03-08

## 2024-03-08 NOTE — TELEPHONE ENCOUNTER
S/W pt.  Pt stated she started amoxicillin today for a bad sinus infection to take for 7 days.  Advised she needs to be rescheduled and that the pt needs to take the antibiotics first.  Advised will cancel the procedure for 3/13 and the  will call her to reschedule.  She verbalized understanding.    Cancelled appt in [facility].    - please reschedule

## 2024-03-08 NOTE — TELEPHONE ENCOUNTER
Caller: jaya    Doctor: brendon    Reason for call: pt has questions about her upcoming procedure and a medication.    Call back#: [phone]

## 2024-03-09 LAB
FERRITIN SERPL-MCNC: 35 NG/ML (ref 11–307)
FOLATE SERPL-MCNC: 5.9 NG/ML
IRON SATN MFR SERPL: 25 % (ref 15–50)
IRON SERPL-MCNC: 101 UG/DL (ref 50–212)
TIBC SERPL-MCNC: 405 UG/DL (ref 250–450)
UIBC SERPL-MCNC: 304 UG/DL (ref 155–355)
VIT B12 SERPL-MCNC: 460 PG/ML (ref 180–914)

## 2024-03-11 ENCOUNTER — OFFICE VISIT (OUTPATIENT)
Dept: HEMATOLOGY ONCOLOGY | Facility: CLINIC | Age: 64
End: 2024-03-11
Payer: MEDICARE

## 2024-03-11 VITALS
RESPIRATION RATE: 17 BRPM | SYSTOLIC BLOOD PRESSURE: 148 MMHG | HEIGHT: 68 IN | BODY MASS INDEX: 36.68 KG/M2 | DIASTOLIC BLOOD PRESSURE: 86 MMHG | OXYGEN SATURATION: 97 % | HEART RATE: 102 BPM | WEIGHT: 242 LBS | TEMPERATURE: 97.1 F

## 2024-03-11 DIAGNOSIS — I82.491 ACUTE DEEP VEIN THROMBOSIS (DVT) OF OTHER SPECIFIED VEIN OF RIGHT LOWER EXTREMITY (HCC): ICD-10-CM

## 2024-03-11 DIAGNOSIS — K95.89 IRON DEFICIENCY ANEMIA FOLLOWING BARIATRIC SURGERY: ICD-10-CM

## 2024-03-11 DIAGNOSIS — Z79.01 CHRONIC ANTICOAGULATION: ICD-10-CM

## 2024-03-11 DIAGNOSIS — I26.99 PE (PULMONARY THROMBOEMBOLISM) (HCC): ICD-10-CM

## 2024-03-11 DIAGNOSIS — I82.491 ACUTE DEEP VEIN THROMBOSIS (DVT) OF OTHER SPECIFIED VEIN OF RIGHT LOWER EXTREMITY (HCC): Primary | ICD-10-CM

## 2024-03-11 DIAGNOSIS — D50.8 IRON DEFICIENCY ANEMIA FOLLOWING BARIATRIC SURGERY: ICD-10-CM

## 2024-03-11 DIAGNOSIS — E53.8 B12 DEFICIENCY: Primary | ICD-10-CM

## 2024-03-11 PROCEDURE — 99214 OFFICE O/P EST MOD 30 MIN: CPT

## 2024-03-11 NOTE — PROGRESS NOTES
"[address]  [facility] HEMATOLOGY ONCOL[facility] SPECIAL[facility] [place]  [address]  [facility] [id number]  Hematology Ambulatory Follow-Up  Li Torre, 1960, [id number]  3/11/2024    Assessment/Plan:  1. B12 deficiency  2. Iron deficiency anemia following bariatric surgery  Ms. Torre is a 63-year-old female seen in follow-up for iron/B12 deficiency.  After receiving IV iron and B12 injections her hemoglobin had returned to within normal limits.  Most recently there is no evidence of anemia with a hemoglobin of 13.6 and MCV of 89.  Ferritin is downtrending from 56 to now 35 with an adequate iron saturation of 25%.  She has mild symptoms of iron deficiency including fatigue which could also be contributed to a current sinus infection.  She is not taking any oral supplementation currently.  We also discussed that her B12 is downtrending to 460 with a folate of 5.9.  Will attempt to supplement this with oral \"blood builder \"twice daily.  She will take this at a different time than her Prilosec.  She has been taking \"Prilosec for the last few months due to GERD symptoms.  I suggested that if she is not getting relief from this and they are ongoing to follow-up with gastroenterology for possible EGD.  We discussed that the use of Prilosec can inhibit the absorption of dietary iron/B12.  She knows to call the office if her symptoms worsen for sooner evaluation and likely transition to IV iron and B12 injections.  Otherwise she will repeat labs in 6 months prior to follow-up.    - CBC and differential; Future  - Comprehensive metabolic panel; Future  - Iron Panel (Includes Ferritin, Iron Sat%, Iron, and TIBC); Future  - Folate; Future  - Vitamin B12; Future  - Methylmalonic acid, serum; Future    3. Acute deep vein thrombosis (DVT) of other specified vein of right lower extremity (HCC)  4. PE (pulmonary thromboembolism) (HCC)  5. Chronic anticoagulation  She has a history of DVT with PE and on lifelong " anticoagulation.  She has switched to Eliquis due to insurance coverage previously and tolerating this well without any excess bleeding or bruising.  Her co-pay for Eliquis is high ($250 for 3 months) but she wishes to continue on this due to not wanting to switch to Coumadin.  We discussed again that if this becomes too financially taxing Coumadin is an option that is likely cheaper for her.  We will have ongoing discussions in the future regarding this.  Refills provided for her today as well as samples.    - apixaban (Eliquis) 5 mg; Take 1 tablet (5 mg total) by mouth 2 (two) times a day  Dispense: 180 tablet; Refill: 3    The patient is scheduled for follow-up in approximately 6 months.   Patient voiced agreement and understanding to the above. Patient knows to call the Hematology/Oncology office with any questions and concerns regarding the above.      Barrier(s) to care: None  The patient is able to self care.  ------------------------------------------------------------------------------------------------------    No chief complaint on file.      History of present illness:   Mkai Torre is a 62-year-old female seen in follow-up for iron deficiency and B12 deficiency anemia.  She was originally seen in consultation in October 2022 after she transitioned her care from [facility]. She states that she had a DVT and PE in 2018 and was treated with Eliquis and recently switched to Xarelto due to insurance coverage indefinitely.  She is tolerating this well without any excess bleeding or bruising.  She has longstanding history of iron deficiency due to gastric bypass, gastric sleeve in 2018.  She is unable to tolerate oral iron due to constipation.     11/30/2018:  Hemoglobin 11.3, MCV 79, platelets 297, WBC 8.7  04/11/2019:  Hemoglobin 8.7, MCV 82, platelets 293, WBC 9.13              Ferritin 12, serum iron 27, TIBC 396  12/06/2021:  Hemoglobin 10.9, MCV 87, platelets 446, WBC 12.71              Ferritin 15,  serum iron 30, TIBC 501  09/15/2022:  Hemoglobin 12.1, MCV 82, platelets 502, WBC 18.26              Ferritin 9, serum iron 36, TIBC 544  10/11/2022: Hemoglobin 11, MCV 84, platelets 484, WBC 10.03              Ferritin 10, iron saturation 5%, TIBC 495, serum iron 27              B12 320, , folate 15.1  Venofer 200 mg x6 (pepcid, benadryl, and decadron): 10/21-11/25 2/17/2023: Hemoglobin 13, MCV 91, platelets 434, WBC 14.45              Ferritin 155, iron saturation 31%, TIBC 4 1, serum iron 126              B12 624, MMA pending  8/18/2023: Hemoglobin 13.6, MCV 99 platelets 4 1, WBC 10.23              Ferritin 56, iron saturation 30%, TIBC 373, serum iron 111              B12 779, folate 9  3/8/2024: Hemoglobin 13.6, MCV 89, platelets 352, WBC 8.82   Ferritin 35, iron saturation 25%, TIBC 405, serum iron 101   B12 460, folate 5.9    Interval history: She has been tolerating Eliquis well without any excess bleeding or bruising.  She has fatigue but overall feeling well and no other constitutional complaints or concerns.  She is not taking any oral supplementation or receiving B12 injections currently.  She is taking a women's multivitamin once daily.    Review of Systems   Constitutional:  Positive for fatigue. Negative for activity change, appetite change, diaphoresis, fever and unexpected weight change.   HENT:  Negative for trouble swallowing and voice change.    Eyes:  Negative for photophobia and visual disturbance.   Respiratory:  Negative for cough, chest tightness and shortness of breath.    Cardiovascular:  Negative for chest pain, palpitations and leg swelling.   Gastrointestinal:  Negative for abdominal distention, abdominal pain, anal bleeding, blood in stool, constipation, diarrhea, nausea and vomiting.   Endocrine: Negative for cold intolerance and heat intolerance.   Genitourinary:  Negative for dysuria, hematuria, urgency and vaginal bleeding.   Musculoskeletal:  Negative for back pain, gait  problem and joint swelling.   Skin:  Negative for pallor and rash.   Neurological:  Negative for dizziness, weakness, light-headedness and headaches.   Hematological:  Negative for adenopathy. Bruises/bleeds easily.   Psychiatric/Behavioral:  Negative for confusion and sleep disturbance.        Patient Active Problem List   Diagnosis    Bipolar depression ([facility])    Hypokalemia    Adrenal insufficiency ([place]'s disease) ([facility])    Vertigo    Fibromyalgia    Osteoarthritis of lumbosacral spine without myelopathy    HLD (hyperlipidemia)    Orthostatic hypotension    History of TIAs    History of migraine    Neck pain    Low back pain    PE (pulmonary thromboembolism) ([facility])    Hypertension    Bipolar II disorder ([facility])    Chronic back pain    Anxiety    Leukocytosis    Chronic anticoagulation    Anemia    Ambulatory dysfunction    Edema    Closed fracture of distal ends of left radius and ulna    Fracture of multiple ribs of right side    History of anxiety    History of pulmonary embolism    Right distal ulnar fracture    Primary osteoarthritis of left shoulder    Status post total replacement of left shoulder    Alkalosis    Primary osteoarthritis of right shoulder    S/P reverse total shoulder arthroplasty, left    Iron deficiency anemia following bariatric surgery    B12 deficiency    Bilateral occipital neuralgia    Closed fracture of styloid process of radius    Fatigue    Anticoagulated by anticoagulation treatment    History of peptic ulcer disease    Hyperglycemia    Hypersomnia    Intractable vomiting with nausea    Lower leg DVT (deep venous thromboembolism), acute, right ([facility])    Lumbar spondylosis    Migraine aura, persistent    Mild episode of recurrent major depressive disorder ([facility])    Myofascial pain    Peripheral neuropathy    Recurrent falls    Right hip pain    Subclinical hyperthyroidism    Thoracic or lumbosacral neuritis or radiculitis    Benzodiazepine dependence ([facility])    Weakness of both lower  "extremities    Postmenopausal    Obesity (BMI 30-39.9)    Preoperative clearance    S/P reverse total shoulder arthroplasty, right    Aftercare following right shoulder joint replacement surgery    IFG (impaired fasting glucose)    Other hyperlipidemia    Other acute recurrent sinusitis    Obesity, morbid ([facility])       Past Medical History:   Diagnosis Date    Adrenal insufficiency ([place]'s disease) ([facility])     Anemia 09 15 2022    Was found in bloodwork done on that day    Anxiety     Arthritis     Bilateral pulmonary contusion 07/03/2020    Bipolar disorder     Cervical radiculopathy     Chest pain     seen in ER 9/18, dx with gas    Chronic back pain     Chronic pain disorder     lumbar    Closed head injury with brief loss of consciousness ([facility]) 10/15/2019    Contusion of right hand 07/03/2021    Depression     DVT, lower extremity ([facility])     1991 and 2019 now on eliquis    Exercises 5 to 6 times per week     5 days per week with weights/band and also goes to PT 2x/week    Fall down stairs 07/03/2020    Fibromyalgia     Fibromyalgia, primary     GERD (gastroesophageal reflux disease)     Headache(784.0) 2018    I get migraines. See Neurologist for this.    Hematoma of parietal scalp 07/03/2020    History of Clostridioides difficile infection     History of MRSA infection     pt denies having this    History of recent fall 07/2021    \"possibly injured left shoulder'    History of TIAs     cannot remember details    Hypertension     Hypokalemia     Intractable migraine without aura and without status migrainosus 10/02/2019    Left shoulder pain     \"not too bad\" goes to PT 2x/week    Migraine     Obesity 2019    Psychiatric disorder     Anxiety, major depression, bipolar    Spinal stenosis     Syncope 2014    orthostatic hypotension    Wears dentures     upper    Wears glasses        Past Surgical History:   Procedure Laterality Date    APPENDECTOMY      CAST APPLICATION Left 07/04/2020    Procedure: Application " short-arm splint;  Surgeon: Sony Zhang MD;  Location: [facility];  Service: Orthopedics    COLONOSCOPY      FL INJECTION LEFT SHOULDER (ARTHROGRAM)  11/10/2021    GASTRIC RESTRICTION SURGERY      Gastric Sleeve Nov 2015    HIP SURGERY  2016    Hip Replacement    HYSTERECTOMY      DONALD    JOINT REPLACEMENT      Left Knee 2011 and Right Hip 2010    KNEE SURGERY  2015    Knee Replacement    OOPHORECTOMY      ORIF WRIST FRACTURE Left 07/04/2020    Procedure: Open reduction and internal fixation left radius and ulnar shaft fracture;  Surgeon: Sony Zhang MD;  Location: [facility];  Service: Orthopedics    VA ARTHROPLASTY GLENOHUMERAL JOINT TOTAL SHOULDER Left 03/30/2021    Procedure: ARTHROPLASTY SHOULDER - anatomic;  Surgeon: Daryn Daniel MD;  Location: [facility];  Service: Orthopedics    VA ARTHROPLASTY GLENOHUMERAL JOINT TOTAL SHOULDER Right 10/24/2023    Procedure: ANATOMIC VS REVERSE TOTAL SHOULDER ARTHROPLASTY, WITH BICEPS TENDONESIS;  Surgeon: Daryn Daniel MD;  Location: [facility];  Service: Orthopedics    VA NATIVIDAD SHOULDER ARTHRPLSTY HUMERAL&GLENOID COMPNT Left 12/21/2021    Procedure: REVISION OF TOTAL SHOULDER ARTHROPLASTY TO REVERSE TOTAL SHOULDER ARTHROPLASTY;  Surgeon: Daryn Daniel MD;  Location: [facility];  Service: Orthopedics       Family History   Problem Relation Age of Onset    Breast cancer Mother 37    Migraines Mother     Arthritis Mother     Depression Mother     Cancer Mother     Anxiety disorder Mother     Kidney disease Father     Heart disease Father     Diabetes Father     Arthritis Father     Hypertension Father     Brain cancer Brother     Seizures Brother        Social History     Socioeconomic History    Marital status: /Civil Union     Spouse name: Not on file    Number of children: Not on file    Years of education: Not on file    Highest education level: Not on file   Occupational History    Occupation: retired   Tobacco Use    Smoking  status: Former     Current packs/day: 0.00     Average packs/day: 0.2 packs/day for 20.0 years (4.0 ttl pk-yrs)     Types: Cigarettes     Start date: 1998     Quit date: 2008     Years since quittin.2    Smokeless tobacco: Never    Tobacco comments:     quit   Vaping Use    Vaping status: Never Used   Substance and Sexual Activity    Alcohol use: Not Currently     Alcohol/week: 2.0 standard drinks of alcohol     Types: 1 Glasses of wine, 1 Standard drinks or equivalent per week     Comment: occasionally    Drug use: Never     Comment: na    Sexual activity: Not Currently     Partners: Male     Birth control/protection: Abstinence     Comment: defer   Other Topics Concern    Not on file   Social History Narrative    Not on file     Social Determinants of Health     Financial Resource Strain: Not on file   Food Insecurity: No Food Insecurity (10/24/2023)    Hunger Vital Sign     Worried About Running Out of Food in the Last Year: Never true     Ran Out of Food in the Last Year: Never true   Transportation Needs: No Transportation Needs (10/24/2023)    PRAPARE - Transportation     Lack of Transportation (Medical): No     Lack of Transportation (Non-Medical): No   Physical Activity: Not on file   Stress: Not on file   Social Connections: Not on file   Intimate Partner Violence: Not on file   Housing Stability: Low Risk  (10/24/2023)    Housing Stability Vital Sign     Unable to Pay for Housing in the Last Year: No     Number of Places Lived in the Last Year: 1     Unstable Housing in the Last Year: No         Current Outpatient Medications:     amLODIPine (NORVASC) 5 mg tablet, Take 5 mg by mouth daily, Disp: , Rfl:     amoxicillin (AMOXIL) 500 MG tablet, Take 1 tablet (500 mg total) by mouth 3 (three) times a day for 7 days, Disp: 21 tablet, Rfl: 0    apixaban (Eliquis) 5 mg, Take 1 tablet (5 mg total) by mouth 2 (two) times a day, Disp: 180 tablet, Rfl: 3    ARIPiprazole (ABILIFY) 5 mg tablet, TAKE 1  "TABLET EVERY MORNING (CORRECTION BY DR OF PREV SCRIPT), Disp: , Rfl:     atorvastatin (LIPITOR) 40 mg tablet, TAKE 1 TABLET BY MOUTH EVERY DAY AT NIGHT, Disp: , Rfl:     B-D INSULIN SYRINGE 1CC/25GX1\" 25G X 1\" 1 ML MISC, , Disp: , Rfl:     celecoxib (CeleBREX) 200 mg capsule, TAKE 1 CAPSULE BY MOUTH EVERY DAY (Patient not taking: Reported on 3/8/2024), Disp: 30 capsule, Rfl: 1    cholecalciferol (VITAMIN D3) 1,000 units tablet, , Disp: , Rfl:     dihydroergotamine (DHE) 1 mg/mL, INJECT 1 ML (1 MG TOTAL) INJECT INTO THE MUSCLE AS NEEDED FOR MIGRAINE., Disp: , Rfl:     furosemide (LASIX) 20 mg tablet, Take 20 mg by mouth as needed Pt reports calls her [facility] cardiologist Dr Michel before taking , Disp: , Rfl:     gabapentin (NEURONTIN) 600 MG tablet, Take 600 mg by mouth 3 (three) times a day, Disp: , Rfl:     hydrOXYzine HCL (ATARAX) 25 mg tablet, TAKE 2 TABLETS (50 MG TOTAL) BY MOUTH DAILY AT BEDTIME AS NEEDED (INSOMNIA) (Patient not taking: Reported on 3/8/2024), Disp: 30 tablet, Rfl: 0    lamoTRIgine (LaMICtal) 200 MG tablet, Take 200 mg by mouth daily., Disp: , Rfl:     LORazepam (ATIVAN) 0.5 mg tablet, Take 1 tablet (0.5 mg total) by mouth daily as needed for anxiety, Disp: 15 tablet, Rfl: 0    meclizine (ANTIVERT) 12.5 MG tablet, Take 1 tablet (12.5 mg total) by mouth every 8 (eight) hours as needed for dizziness for up to 7 days, Disp: 21 tablet, Rfl: 0    methocarbamol (ROBAXIN) 750 mg tablet, , Disp: , Rfl:     nystatin (MYCOSTATIN) cream, APPLY TO AFFECTED AREA TWICE A DAY, Disp: 30 g, Rfl: 0    ondansetron (ZOFRAN-ODT) 4 mg disintegrating tablet, TAKE 1 TABLET BY MOUTH EVERY 8 HOURS AS NEEDED FOR NAUSEA AND VOMITING, Disp: , Rfl:     potassium chloride (K-DUR,KLOR-CON) 20 mEq tablet, Take 20 mEq by mouth daily  , Disp: , Rfl:     Ubrelvy 100 MG tablet, TAKE 1 TABLET TWICE A DAY AS NEEDED FOR HEADACHE, LIMIT 16 TABS PER MONTH, Disp: , Rfl:     Allergies   Allergen Reactions    Ketorolac Itching and Other (See " Comments)     [toradol] Itching, hives    Mushroom Extract Complex - Food Allergy Hives    Oxycodone Other (See Comments)     Irritable ,severe mood change        Objective:  There were no vitals taken for this visit.   Physical Exam  Constitutional:       General: She is not in acute distress.     Appearance: Normal appearance. She is not ill-appearing.   HENT:      Head: Normocephalic and atraumatic.   Eyes:      Extraocular Movements: Extraocular movements intact.      Conjunctiva/sclera: Conjunctivae normal.   Cardiovascular:      Rate and Rhythm: Normal rate and regular rhythm.      Pulses: Normal pulses.      Heart sounds: Normal heart sounds. No murmur heard.  Pulmonary:      Effort: Pulmonary effort is normal. No respiratory distress.      Breath sounds: Normal breath sounds.   Abdominal:      General: Bowel sounds are normal. There is no distension.      Palpations: Abdomen is soft.      Tenderness: There is no abdominal tenderness.   Musculoskeletal:         General: Normal range of motion.      Cervical back: Normal range of motion. No tenderness.      Right lower leg: No edema.      Left lower leg: No edema.   Lymphadenopathy:      Cervical: No cervical adenopathy.   Skin:     General: Skin is warm and dry.      Capillary Refill: Capillary refill takes less than 2 seconds.      Coloration: Skin is not pale.      Findings: No bruising or lesion.   Neurological:      General: No focal deficit present.      Mental Status: She is alert and oriented to person, place, and time. Mental status is at baseline.      Motor: No weakness.      Gait: Gait normal.   Psychiatric:         Mood and Affect: Mood normal.         Behavior: Behavior normal.         Thought Content: Thought content normal.         Judgment: Judgment normal.         Result Review  Labs:  Appointment on 03/08/2024   Component Date Value Ref Range Status    WBC 03/08/2024 8.82  4.31 - 10.16 Thousand/uL Final    RBC 03/08/2024 4.61  3.81 - 5.12  Million/uL Final    Hemoglobin 03/08/2024 13.6  11.5 - 15.4 g/dL Final    Hematocrit 03/08/2024 41.2  34.8 - 46.1 % Final    MCV 03/08/2024 89  82 - 98 fL Final    MCH 03/08/2024 29.5  26.8 - 34.3 pg Final    MCHC 03/08/2024 33.0  31.4 - 37.4 g/dL Final    RDW 03/08/2024 15.7 (H)  11.6 - 15.1 % Final    MPV 03/08/2024 10.0  8.9 - 12.7 fL Final    Platelets 03/08/2024 352  149 - 390 Thousands/uL Final    nRBC 03/08/2024 0  /100 WBCs Final    Neutrophils Relative 03/08/2024 60  43 - 75 % Final    Immat GRANS % 03/08/2024 1  0 - 2 % Final    Lymphocytes Relative 03/08/2024 29  14 - 44 % Final    Monocytes Relative 03/08/2024 8  4 - 12 % Final    Eosinophils Relative 03/08/2024 1  0 - 6 % Final    Basophils Relative 03/08/2024 1  0 - 1 % Final    Neutrophils Absolute 03/08/2024 5.37  1.85 - 7.62 Thousands/µL Final    Immature Grans Absolute 03/08/2024 0.05  0.00 - 0.20 Thousand/uL Final    Lymphocytes Absolute 03/08/2024 2.57  0.60 - 4.47 Thousands/µL Final    Monocytes Absolute 03/08/2024 0.70  0.17 - 1.22 Thousand/µL Final    Eosinophils Absolute 03/08/2024 0.08  0.00 - 0.61 Thousand/µL Final    Basophils Absolute 03/08/2024 0.05  0.00 - 0.10 Thousands/µL Final   Appointment on 03/07/2024   Component Date Value Ref Range Status    Folate 03/08/2024 5.9 (L)  >5.9 ng/mL Final    The World Health Organization has determined deficient folate concentrations are considered to be <4.0 ng/mL.    Vitamin B-12 03/08/2024 460  180 - 914 pg/mL Final    Iron Saturation 03/08/2024 25  15 - 50 % Final    TIBC 03/08/2024 405  250 - 450 ug/dL Final    Iron 03/08/2024 101  50 - 212 ug/dL Final    Patients treated with metal-binding drugs (ie. Deferoxamine) may have depressed iron values.    UIBC 03/08/2024 304  155 - 355 ug/dL Final    Ferritin 03/08/2024 35  11 - 307 ng/mL Final       Imaging:   No relevant imaging to review     Please note:  This report has been generated by a voice recognition software system. Therefore there may be  syntax, spelling, and/or grammatical errors. Please call if you have any questions.

## 2024-03-11 NOTE — PROGRESS NOTES
Assessment/Plan:    64 y/o woman with: acute sinusitis, severe obesity with comorbidity, adrenal insufficiency, HTN, h/o PE and bipolar disorder. Will continue meds and add antibiotic. Discussed supportive care and return parameters. Encourage returning for BP check in 2 weeks.    No problem-specific Assessment & Plan notes found for this encounter.       Diagnoses and all orders for this visit:    Other acute recurrent sinusitis  -     amoxicillin (AMOXIL) 500 MG tablet; Take 1 tablet (500 mg total) by mouth 3 (three) times a day for 7 days    Severe obesity (BMI 35.0-39.9) with comorbidity (HCC)    Adrenal insufficiency ([place]'s disease) (HCC)    PE (pulmonary thromboembolism) (HCC)    Bipolar depression (HCC)  -     LORazepam (ATIVAN) 0.5 mg tablet; Take 1 tablet (0.5 mg total) by mouth daily as needed for anxiety    Primary hypertension          Subjective:     Chief Complaint   Patient presents with    Sinus Problem     Pt is here for sinus issue diarrhea and fever since sat pt also states having a lot of fatigue  no other problems or concerns claudia         Patient ID: Li Torre is a 63 y.o. female.    Patient is a 64 y/o woman who presents complaining of cough congestion sinus pressure for one week with severe obesity with comorbidity, adrenal insufficiency, h/o PE HTN and bipolar disorder. Pt admits being otherwise stable on meds and denies acute complaints no fevers chills nausea or vomiting.     Sinus Problem  Associated symptoms include congestion, coughing and sinus pressure.       The following portions of the patient's history were reviewed and updated as appropriate: allergies, current medications, past family history, past medical history, past social history, past surgical history and problem list.    Review of Systems   Constitutional: Negative.    HENT:  Positive for congestion and sinus pressure.    Eyes: Negative.    Respiratory:  Positive for cough.    Cardiovascular: Negative.   "  Gastrointestinal: Negative.    Endocrine: Negative.    Genitourinary: Negative.    Musculoskeletal: Negative.    Allergic/Immunologic: Negative.    Neurological: Negative.    Hematological: Negative.    Psychiatric/Behavioral: Negative.     All other systems reviewed and are negative.        Objective:      /90 (BP Location: Right arm, Patient Position: Sitting, Cuff Size: Standard)   Pulse 97   Temp 97.8 °F (36.6 °C) (Temporal)   Ht 5' 8\" (1.727 m)   Wt 111 kg (244 lb)   SpO2 98%   BMI 37.10 kg/m²          Physical Exam  Constitutional:       Appearance: She is well-developed.   HENT:      Head: Atraumatic.      Right Ear: External ear normal.      Left Ear: External ear normal.   Eyes:      Conjunctiva/sclera: Conjunctivae normal.      Pupils: Pupils are equal, round, and reactive to light.   Cardiovascular:      Rate and Rhythm: Normal rate and regular rhythm.      Heart sounds: Normal heart sounds.   Pulmonary:      Effort: Pulmonary effort is normal. No respiratory distress.      Breath sounds: Normal breath sounds.   Abdominal:      General: There is no distension.      Palpations: Abdomen is soft.      Tenderness: There is no abdominal tenderness. There is no guarding or rebound.   Musculoskeletal:         General: Normal range of motion.      Cervical back: Normal range of motion.   Skin:     General: Skin is warm and dry.   Neurological:      Mental Status: She is alert and oriented to person, place, and time.      Cranial Nerves: No cranial nerve deficit.   Psychiatric:         Behavior: Behavior normal.         Thought Content: Thought content normal.         Judgment: Judgment normal.          "

## 2024-03-12 ENCOUNTER — APPOINTMENT (OUTPATIENT)
Dept: PHYSICAL THERAPY | Facility: CLINIC | Age: 64
End: 2024-03-12
Payer: COMMERCIAL

## 2024-03-14 ENCOUNTER — EVALUATION (OUTPATIENT)
Dept: PHYSICAL THERAPY | Facility: CLINIC | Age: 64
End: 2024-03-14
Payer: COMMERCIAL

## 2024-03-14 DIAGNOSIS — R26.2 AMBULATORY DYSFUNCTION: Primary | ICD-10-CM

## 2024-03-14 DIAGNOSIS — R29.898 WEAKNESS OF BOTH LOWER EXTREMITIES: ICD-10-CM

## 2024-03-14 PROCEDURE — 97112 NEUROMUSCULAR REEDUCATION: CPT

## 2024-03-14 PROCEDURE — 97110 THERAPEUTIC EXERCISES: CPT

## 2024-03-14 PROCEDURE — 97164 PT RE-EVAL EST PLAN CARE: CPT

## 2024-03-14 NOTE — PROGRESS NOTES
Re-evaluation      Today's date: 3/14/2024  Patient name: Li Torre  : 1960  MRN: [id number]  Referring provider: Derrick Vasquez MD  Dx:   Encounter Diagnosis     ICD-10-CM    1. Ambulatory dysfunction  R26.2       2. Weakness of both lower extremities  R29.898           Start Time: 0930  Stop Time: 1010  Total time in clinic (min): 40 minutes    Subjective: Pt stated that she has been dealing with a bad sinus infection and being sick for the past few weeks where she has not been able to do any of her exercises and feels like she is a little bit weaker compared to when she came in last. Pt stated that her R shoulder still feels weak and wants to contact her doctor to see if she should come back in for her shoulder or if anything else needs to be done for it.       Objective: See treatment diary below    Active Range of Motion      Lumbar   Flexion:  WFL  Extension:  Restriction level: minimal   Left lateral flexion:  Restriction level: minimal  Right lateral flexion:  Restriction level: minimal  Left rotation:  Restriction level: minimal  Right rotation:  Restriction level: minimal  Mechanical Assessment     Cervical        Thoracic        Lumbar     Standing extension: repeated movements  Pain location: no change  Pain level: produced     Strength/Myotome Testing      Left Hip   Planes of Motion   Flexion: 3+  Extension: 3+  Abduction: 3     Right Hip   Planes of Motion   Flexion: 4-  Extension: 3+  Abduction: 3     Left Knee   Flexion: 4+  Extension: 4+  Quadriceps contraction: good     Right Knee   Flexion: 4  Extension: 4-  Quadriceps contraction: fair     Muscle Activation   Patient unable to activate left transverse abdominals, left multifidus, right transverse abdominals and right multifidus.      Additional Muscle Activation Details  Poor L TA and b/l multifidi activation  Fair R TA activation    Assessment: Pt was re evaluated today. Pt demonstrates good L/S ROM and flexibility with only  "slight stretching sensation along b/l paraspinals with performance of L/S sidebending. Pt continues to show decreased BLE strength with increased deficits noted on RLE but pt strength is slightly decreased compared to last visit due to pt not moving too much from being sick the past two weeks. Pt was educated on importance of performing activities on HEP to improve functional ability and strength, pt verbally acknowledged education. Pt showed slight improvement in R TA activation but b/l multifidi and L TA activation continues to be poor. Pt could not tolerate being in prone position today and could not get new MMT for b/l hip extension. Pt had difficulty with performance of R hip flexion activities in supine or standing, modify activities to pt tolerance next visit. Overall, pt has not shown much improvement since last RE but pt has also been sick and not able to attend sessions as of late as well as has not performed activities at home. Pt would benefit from continued skilled PT to improve BLE strength, L/S flexibility, core stability, gait, balance, and functional ability.       Plan: Continue per plan of care.  Progress treatment as tolerated.       Precautions: S/p R reverse TSA on 10/24/23    Date 1/9 1/30 2/8 2/20 2/22 2/27 3/1 3/14     Visit # 3 4 5 6 7 8 9 10     FOTO IE   done          Re-eval IE       DK       Manuals 1/9 1/30 2/8 2/20 2/22 2/27 3/1 3/14                                                         Neuro Re-Ed 1/9 1/30 2/8 2/20 2/22 2/27 3/1 3/14     clamshells 20x3\" GTB ea 20x3\" GTB ea 2x10 3\" GTB ea 2x10 3\" GTB ea 2x10 3\" GTB ea 2x10 3\" GTB ea 2x15 GTB      marching  30x  20x ea 20x ea OTB stand 2x10 ea PTB 2x10 ea PTB 2x15 GTB 2x10 ea PTB      bridges 15x5\" 15x5\" 2x10 5\" 2x10 5\" 2x10 5\" 2x10 5\" GTB 2x10 5\" GTB      LAQ        2x10 1# ea                                            Ther Ex 1/9 1/30 2/8 2/20 2/22 2/27 3/1 3/14     bike  5' 6' 6' 8' 8' 8' 6'     SLR 10x ea w QS 2x10 ea  w QS 2x10 " ea w QS 2x10 ea w QS 2x10 ea w QS 2x10 ea w QS 2x10 1#      S/l hip abd  15x 2x10 ea 2x10 ea 2x10 ea 2x10 ea 2x10 1#      Standing hip abd/ext 10x ea 2x10 ea 2x10 ea 2x10 ea OTB 2x10 ea PTB 2x10 ea OTB 2x10 ea PTB 2x10 ea w flex     sidestepping  PTB 3 laps 3 laps PTB   3 laps PTB 3 laps PTB      Leg press   2x10 125# BLE 2x15 135# BLE 2x15 135# BLE 2x15 135# BLE 2x15 135# BLE      Monster walks    2 laps GTB np                     Ther Activity 1/9 1/30 2/8 2/20 2/22 2/27 3/1      squats  2x10 2x10 2x10 2x10 2x10 2x10      Step ups  nv 2x10 L up 1R 2x10 ea up 1R 2x10 ea up 1R 2x10 ea up 1R 2x10 ea up 1R      Gait Training 1/9 1/30 2/27       hurdles  nv                        Modalities 1/9 1/30

## 2024-03-19 ENCOUNTER — OFFICE VISIT (OUTPATIENT)
Dept: PHYSICAL THERAPY | Facility: CLINIC | Age: 64
End: 2024-03-19
Payer: COMMERCIAL

## 2024-03-19 DIAGNOSIS — R29.898 WEAKNESS OF BOTH LOWER EXTREMITIES: ICD-10-CM

## 2024-03-19 DIAGNOSIS — R26.2 AMBULATORY DYSFUNCTION: Primary | ICD-10-CM

## 2024-03-19 PROCEDURE — 97110 THERAPEUTIC EXERCISES: CPT

## 2024-03-19 PROCEDURE — 97112 NEUROMUSCULAR REEDUCATION: CPT

## 2024-03-19 NOTE — PROGRESS NOTES
"Daily Note     Today's date: 3/19/2024  Patient name: Li Torre  : 1960  MRN: [id number]  Referring provider: Derrick Vasquez MD  Dx:   Encounter Diagnosis     ICD-10-CM    1. Ambulatory dysfunction  R26.2       2. Weakness of both lower extremities  R29.898           Start Time: 1025  Stop Time: 1103  Total time in clinic (min): 38 minutes    Subjective: Pt stated that after last session she had a lot of stiffness and discomfort in her R hip flexor and thigh but got better after rest the next day.       Objective: See treatment diary below      Assessment: Pt tolerated warm up on bike well at beginning of session without increase in discomfort during or after activity. Pt had difficulty with R SLR activity but did not have any discomfort with L side or with abduction. Pt tolerated supine BLE strengthening activities well today with muscle soreness noted b/l but no increase in pain during or after performance. Pt was challenged appropriately with performance of leg press activity at end of session with mild fatigue post session. Pt would benefit from continued skilled PT to improve BLE strength, gait, balance, mobility, and functional ability.       Plan: Continue per plan of care.  Progress treatment as tolerated.       Precautions: S/p R reverse TSA on 10/24/23    Date 1/9 1/30 2/8 2/20 2/22 2/27 3/1 3/14 3/19    Visit # 3 4 5 6 7 8 9 10 11    FOTO IE   done          Re-eval IE       DK       Manuals 1/9 1/30 2/8 2/20 2/22 2/27 3/1 3/14 3/19                                                        Neuro Re-Ed 1/9 1/30 2/8 2/20 2/22 2/27 3/1 3/14 3/19    clamshells 20x3\" GTB ea 20x3\" GTB ea 2x10 3\" GTB ea 2x10 3\" GTB ea 2x10 3\" GTB ea 2x10 3\" GTB ea 2x15 GTB  2x10 PTB ea s/l     marching  30x  20x ea 20x ea OTB stand 2x10 ea PTB 2x10 ea PTB 2x15 GTB 2x10 ea PTB  2x10 ea PTB h/l    bridges 15x5\" 15x5\" 2x10 5\" 2x10 5\" 2x10 5\" 2x10 5\" GTB 2x10 5\" GTB  2x10 5\"     LAQ        2x10 1# ea 2x10 1# ea         "                                   Ther Ex 1/9 1/30 2/8 2/20 2/22 2/27 3/1 3/14 3/19    bike  5' 6' 6' 8' 8' 8' 6' 6'    SLR 10x ea w QS 2x10 ea  w QS 2x10 ea w QS 2x10 ea w QS 2x10 ea w QS 2x10 ea w QS 2x10 1#  2x10 ea    S/l hip abd  15x 2x10 ea 2x10 ea 2x10 ea 2x10 ea 2x10 1#  2x10 ea    Standing hip abd/ext 10x ea 2x10 ea 2x10 ea 2x10 ea OTB 2x10 ea PTB 2x10 ea OTB 2x10 ea PTB 2x10 ea w flex 2x10 ea    sidestepping  PTB 3 laps 3 laps PTB   3 laps PTB 3 laps PTB      Leg press   2x10 125# BLE 2x15 135# BLE 2x15 135# BLE 2x15 135# BLE 2x15 135# BLE  2x15 135# BLE, 2x10 155# BLE    Monster walks    2 laps GTB np                     Ther Activity 1/9 1/30 2/8 2/20 2/22 2/27 3/1  3/19    squats  2x10 2x10 2x10 2x10 2x10 2x10      Step ups  nv 2x10 L up 1R 2x10 ea up 1R 2x10 ea up 1R 2x10 ea up 1R 2x10 ea up 1R      Gait Training 1/9 1/30 2/27       hurdles  nv                        Modalities 1/9 1/30

## 2024-03-21 ENCOUNTER — OFFICE VISIT (OUTPATIENT)
Dept: PHYSICAL THERAPY | Facility: CLINIC | Age: 64
End: 2024-03-21
Payer: COMMERCIAL

## 2024-03-21 DIAGNOSIS — R29.898 WEAKNESS OF BOTH LOWER EXTREMITIES: ICD-10-CM

## 2024-03-21 DIAGNOSIS — R26.2 AMBULATORY DYSFUNCTION: Primary | ICD-10-CM

## 2024-03-21 PROCEDURE — 97110 THERAPEUTIC EXERCISES: CPT

## 2024-03-21 PROCEDURE — 97112 NEUROMUSCULAR REEDUCATION: CPT

## 2024-03-21 NOTE — PROGRESS NOTES
"Daily Note     Today's date: 3/21/2024  Patient name: Li Torre  : 1960  MRN: [id number]  Referring provider: Derrick Vasquez MD  Dx:   Encounter Diagnosis     ICD-10-CM    1. Ambulatory dysfunction  R26.2       2. Weakness of both lower extremities  R29.898           Start Time: 1027  Stop Time: 1107  Total time in clinic (min): 40 minutes    Subjective: Pt stated that she is feeling much better today after last session and icing after session.       Objective: See treatment diary below      Assessment: Pt tolerated warm up on bike well at beginning of session without increase in discomfort during or after activity. Pt was challenged appropriately with progression of resistance with BLE strengthening activities on therapy table. Pt required mild verbal and tactile cueing to perform sidelying hip strengthening activities with proper form, had mild difficulty with s/l hip abduction activity with decreased ROM when helped into proper pelvic position. Pt also tolerated progression of resistance with leg press and standing BLE strengthening activities without discomfort. Pt would benefit from continued skilled PT to improve BLE strength, balance, gait, flexibility, mobility, and functional ability.       Plan: Continue per plan of care.  Progress treatment as tolerated.       Precautions: S/p R reverse TSA on 10/24/23    Date 1/9 1/30 2/8 2/20 2/22 2/27 3/1 3/14 3/19 3/21   Visit # 3 4 5 6 7 8 9 10 11 12   FOTO IE   done       done   Re-eval IE       DK       Manuals 1/9 1/30 2/8 2/20 2/22 2/27 3/1 3/14 3/19 3/21                                                       Neuro Re-Ed 1/9 1/30 2/8 2/20 2/22 2/27 3/1 3/14 3/19 3/21   clamshells 20x3\" GTB ea 20x3\" GTB ea 2x10 3\" GTB ea 2x10 3\" GTB ea 2x10 3\" GTB ea 2x10 3\" GTB ea 2x15 GTB  2x10 PTB ea s/l  2x10 GTB ea s/l   marching  30x  20x ea 20x ea OTB stand 2x10 ea PTB 2x10 ea PTB 2x15 GTB 2x10 ea PTB  2x10 ea PTB h/l 2x10 ea GTB h/l   bridges 15x5\" 15x5\" " "2x10 5\" 2x10 5\" 2x10 5\" 2x10 5\" GTB 2x10 5\" GTB  2x10 5\"  2x10 5\"   LAQ        2x10 1# ea 2x10 1# ea 2x10 2# ea                                          Ther Ex 1/9 1/30 2/8 2/20 2/22 2/27 3/1 3/14 3/19 3/21   bike  5' 6' 6' 8' 8' 8' 6' 6' 6'   SLR 10x ea w QS 2x10 ea  w QS 2x10 ea w QS 2x10 ea w QS 2x10 ea w QS 2x10 ea w QS 2x10 1#  2x10 ea 2x10 1# R, 2# L   S/l hip abd  15x 2x10 ea 2x10 ea 2x10 ea 2x10 ea 2x10 1#  2x10 ea 2x10 1# R, 2# L   Standing hip abd/ext 10x ea 2x10 ea 2x10 ea 2x10 ea OTB 2x10 ea PTB 2x10 ea OTB 2x10 ea PTB 2x10 ea w flex 2x10 ea 2x10 OTB ea   sidestepping  PTB 3 laps 3 laps PTB   3 laps PTB 3 laps PTB      Leg press   2x10 125# BLE 2x15 135# BLE 2x15 135# BLE 2x15 135# BLE 2x15 135# BLE  2x15 135# BLE, 2x10 155# BLE 2x15 165# BLE   Monster walks    2 laps GTB np                     Ther Activity 1/9 1/30 2/8 2/20 2/22 2/27 3/1  3/19 3/21   squats  2x10 2x10 2x10 2x10 2x10 2x10      Step ups  nv 2x10 L up 1R 2x10 ea up 1R 2x10 ea up 1R 2x10 ea up 1R 2x10 ea up 1R   2x10 ea up 1R   Gait Training 1/9 1/30 2/27       hurdles  nv                        Modalities 1/9 1/30                                                      "

## 2024-03-26 ENCOUNTER — APPOINTMENT (OUTPATIENT)
Dept: PHYSICAL THERAPY | Facility: CLINIC | Age: 64
End: 2024-03-26
Payer: COMMERCIAL

## 2024-03-28 ENCOUNTER — OFFICE VISIT (OUTPATIENT)
Dept: PHYSICAL THERAPY | Facility: CLINIC | Age: 64
End: 2024-03-28
Payer: COMMERCIAL

## 2024-03-28 DIAGNOSIS — Z79.01 CHRONIC ANTICOAGULATION: ICD-10-CM

## 2024-03-28 DIAGNOSIS — R26.2 AMBULATORY DYSFUNCTION: Primary | ICD-10-CM

## 2024-03-28 DIAGNOSIS — I82.491 ACUTE DEEP VEIN THROMBOSIS (DVT) OF OTHER SPECIFIED VEIN OF RIGHT LOWER EXTREMITY (HCC): ICD-10-CM

## 2024-03-28 DIAGNOSIS — I26.99 PE (PULMONARY THROMBOEMBOLISM) (HCC): ICD-10-CM

## 2024-03-28 DIAGNOSIS — R29.898 WEAKNESS OF BOTH LOWER EXTREMITIES: ICD-10-CM

## 2024-03-28 PROCEDURE — 97110 THERAPEUTIC EXERCISES: CPT | Performed by: PHYSICAL THERAPIST

## 2024-03-28 PROCEDURE — 97112 NEUROMUSCULAR REEDUCATION: CPT | Performed by: PHYSICAL THERAPIST

## 2024-03-28 NOTE — PROGRESS NOTES
"Daily Note     Today's date: 3/28/2024  Patient name: Li Torre  : 1960  MRN: [id number]  Referring provider: Derrick Vasquez MD  Dx:   Encounter Diagnosis     ICD-10-CM    1. Ambulatory dysfunction  R26.2       2. Weakness of both lower extremities  R29.898                      Subjective: patient reports she was up at 3am to drive home from [place] to make it to the appointment on time. She mentions her legs feel sore today due to it and she is tired.      Objective: See treatment diary below      Assessment: Tolerated treatment well. Held on progressions secondary to patient subjection. She demonstrations hip ER compensation during sidelying hip abd, poor carryover after cueing. Patient demonstrated fatigue post treatment and would benefit from continued PT      Plan: Continue per plan of care.  Progress treatment as tolerated.       Precautions: S/p R reverse TSA on 10/24/23    Date 3/28  2/8 2/20 2/22 2/27 3/1 3/14 3/19 3/21   Visit # 13  5 6 7 8 9 10 11 12   FOTO    done       done   Re-eval        DK       Manuals 3/28  2/8 2/20 2/22 2/27 3/1 3/14 3/19 3/21                                                       Neuro Re-Ed   2/8 2/20 2/22 2/27 3/1 3/14 3/19 3/21   clamshells 2x10 GTB ea s/l  2x10 3\" GTB ea 2x10 3\" GTB ea 2x10 3\" GTB ea 2x10 3\" GTB ea 2x15 GTB  2x10 PTB ea s/l  2x10 GTB ea s/l   marching 2x10 GTB ea h/l  20x ea 20x ea OTB stand 2x10 ea PTB 2x10 ea PTB 2x15 GTB 2x10 ea PTB  2x10 ea PTB h/l 2x10 ea GTB h/l   bridges 2x10  5\"  2x10 5\" 2x10 5\" 2x10 5\" 2x10 5\" GTB 2x10 5\" GTB  2x10 5\"  2x10 5\"   LAQ 2x10 2# ea       2x10 1# ea 2x10 1# ea 2x10 2# ea                                          Ther Ex 3/28  2/8 2/20 2/22 2/27 3/1 3/14 3/19 3/21   bike 6'  6' 6' 8' 8' 8' 6' 6' 6'   SLR 2x10 1# R, 2# L  2x10 ea w QS 2x10 ea w QS 2x10 ea w QS 2x10 ea w QS 2x10 1#  2x10 ea 2x10 1# R, 2# L   S/l hip abd 2x10 1# R, 2# L  2x10 ea 2x10 ea 2x10 ea 2x10 ea 2x10 1#  2x10 ea 2x10 1# R, 2# L "   Standing hip abd/ext OTB 2x10 ea  2x10 ea 2x10 ea OTB 2x10 ea PTB 2x10 ea OTB 2x10 ea PTB 2x10 ea w flex 2x10 ea 2x10 OTB ea   sidestepping   3 laps PTB   3 laps PTB 3 laps PTB      Leg press 2x15  165#  BLE  2x10 125# BLE 2x15 135# BLE 2x15 135# BLE 2x15 135# BLE 2x15 135# BLE  2x15 135# BLE, 2x10 155# BLE 2x15 165# BLE   Monster walks    2 laps GTB np                     Ther Activity 3/28  2/8 2/20 2/22 2/27 3/1  3/19 3/21   squats   2x10 2x10 2x10 2x10 2x10      Step ups 2x10 ea  1R  2x10 L up 1R 2x10 ea up 1R 2x10 ea up 1R 2x10 ea up 1R 2x10 ea up 1R   2x10 ea up 1R   Gait Training      2/27       hurdles                          Modalities

## 2024-04-01 DIAGNOSIS — I26.99 PE (PULMONARY THROMBOEMBOLISM) (HCC): ICD-10-CM

## 2024-04-01 DIAGNOSIS — Z79.01 CHRONIC ANTICOAGULATION: ICD-10-CM

## 2024-04-01 DIAGNOSIS — I82.491 ACUTE DEEP VEIN THROMBOSIS (DVT) OF OTHER SPECIFIED VEIN OF RIGHT LOWER EXTREMITY (HCC): ICD-10-CM

## 2024-04-02 ENCOUNTER — OFFICE VISIT (OUTPATIENT)
Dept: PHYSICAL THERAPY | Facility: CLINIC | Age: 64
End: 2024-04-02
Payer: COMMERCIAL

## 2024-04-02 DIAGNOSIS — R26.2 AMBULATORY DYSFUNCTION: Primary | ICD-10-CM

## 2024-04-02 DIAGNOSIS — R29.898 WEAKNESS OF BOTH LOWER EXTREMITIES: ICD-10-CM

## 2024-04-02 PROCEDURE — 97110 THERAPEUTIC EXERCISES: CPT

## 2024-04-02 PROCEDURE — 97112 NEUROMUSCULAR REEDUCATION: CPT

## 2024-04-02 NOTE — PROGRESS NOTES
"Daily Note     Today's date: 2024  Patient name: Li Torre  : 1960  MRN: [id number]  Referring provider: Derrick Vasquez MD  Dx:   Encounter Diagnosis     ICD-10-CM    1. Ambulatory dysfunction  R26.2       2. Weakness of both lower extremities  R29.898           Start Time: 0950  Stop Time: 1035  Total time in clinic (min): 45 minutes    Subjective: Pt stated that she is doing well today with decreased pain in her back and legs compared to last visit.       Objective: See treatment diary below      Assessment: Pt tolerated warm up on bike well at beginning of session without increase in discomfort during or after activity. Pt tolerated progression of resistance of all BLE strengthening activities today in standing position with mild fatigue post performance but no increase in discomfort post performance. Pt also showed improved squat mechanics during session but with mild fatigue during performance. Pt continues to demonstrate decreased R hip strength with SLR flexion and abduction compared to LLE but was able to complete full sets of repetitions with brief rest break. Pt would benefit from continued skilled PT to improve BLE strength, mobility, gait, balance, and functional ability.     Plan: Continue per plan of care.  Progress treatment as tolerated.       Precautions: S/p R reverse TSA on 10/24/23    Date 3/28 4/2     3/1 3/14 3/19 3/21   Visit # 13 14     9 10 11 12   FOTO          done   Re-eval        DK       Manuals 3/28 4/2     3/1 3/14 3/19 3/21                                                       Neuro Re-Ed  4/2     3/1 3/14 3/19 3/21   clamshells 2x10 GTB ea s/l 2x10 GTB ea s/l      2x15 GTB  2x10 PTB ea s/l  2x10 GTB ea s/l   marching 2x10 GTB ea h/l 2x10 PTB stand, 2x10 GTB h/l      2x15 GTB 2x10 ea PTB  2x10 ea PTB h/l 2x10 ea GTB h/l   bridges 2x10  5\" 2x10 5\" w GTB abd     2x10 5\" GTB  2x10 5\"  2x10 5\"   LAQ 2x10 2# ea 2x10 2.5# ea      2x10 1# ea 2x10 1# ea 2x10 2# ea       "                                    Ther Ex 3/28 4/2     3/1 3/14 3/19 3/21   bike 6' 6'     8' 6' 6' 6'   SLR 2x10 1# R, 2# L 2x10 2# R, 2.5# L     2x10 1#  2x10 ea 2x10 1# R, 2# L   S/l hip abd 2x10 1# R, 2# L 2x10 2# R, 2.5# L     2x10 1#  2x10 ea 2x10 1# R, 2# L   Standing hip abd/ext OTB 2x10 ea 2x10 PTB ea     2x10 ea PTB 2x10 ea w flex 2x10 ea 2x10 OTB ea   sidestepping  4 laps PTB     3 laps PTB      Leg press 2x15  165#  BLE 2x15 185# BLE     2x15 135# BLE  2x15 135# BLE, 2x10 155# BLE 2x15 165# BLE   Monster walks  4 laps PTB                        Ther Activity 3/28 4/2     3/1  3/19 3/21   squats  2x10     2x10      Step ups 2x10 ea  1R 2x10 ea 1R     2x10 ea up 1R   2x10 ea up 1R   Gait Training             hurdles  nv                        Modalities

## 2024-04-04 ENCOUNTER — APPOINTMENT (OUTPATIENT)
Dept: PHYSICAL THERAPY | Facility: CLINIC | Age: 64
End: 2024-04-04
Payer: COMMERCIAL

## 2024-04-05 ENCOUNTER — OFFICE VISIT (OUTPATIENT)
Dept: PHYSICAL THERAPY | Facility: CLINIC | Age: 64
End: 2024-04-05
Payer: COMMERCIAL

## 2024-04-05 DIAGNOSIS — R29.898 WEAKNESS OF BOTH LOWER EXTREMITIES: ICD-10-CM

## 2024-04-05 DIAGNOSIS — R26.2 AMBULATORY DYSFUNCTION: Primary | ICD-10-CM

## 2024-04-05 PROCEDURE — 97110 THERAPEUTIC EXERCISES: CPT

## 2024-04-05 PROCEDURE — 97112 NEUROMUSCULAR REEDUCATION: CPT

## 2024-04-05 NOTE — PROGRESS NOTES
Daily Note     Today's date: 2024  Patient name: Li Torre  : 1960  MRN: [id number]  Referring provider: Derrick Vasquez MD  Dx:   Encounter Diagnosis     ICD-10-CM    1. Ambulatory dysfunction  R26.2       2. Weakness of both lower extremities  R29.898           Start Time: 0754  Stop Time: 0832  Total time in clinic (min): 38 minutes    Subjective: Pt stated that she is very tired this morning but is otherwise doing well.       Objective: See treatment diary below      Assessment: Pt tolerated warm up on bike well at beginning of session without increase in discomfort during or after activity. Pt showed improved form and activity endurance today with leg press, squats, and step up activities with mild fatigue post performance but no increase in discomfort. Pt was challenged with trial of hurdles with step to gait pattern and use of UE support on banister, especially when stepping over with the RLE, which demonstrated decreased hip flexion ability and increased fatigue as activity progressed. Pt required mild verbal cueing to perform hip flexion and abduction activities with proper form. Pt also showed difficulty with SLR activities on therapy table with RLE and had to modify resistance with activity. Pt would benefit from continued skilled PT to improve BLE strength, mobility, flexibility, balance, gait, and functional ability.     Plan: Continue per plan of care.  Progress treatment as tolerated.       Precautions: S/p R reverse TSA on 10/24/23    Date 3/28 4/2 4/4    3/1 3/14 3/19 3/21   Visit # 13 14 15    9 10 11 12   FOTO          done   Re-eval        DK       Manuals 3/28 4/2 4/4    3/1 3/14 3/19 3/21                                                       Neuro Re-Ed  4/2 4/4    3/1 3/14 3/19 3/21   clamshells 2x10 GTB ea s/l 2x10 GTB ea s/l  2x10 GTB ea s/l    2x15 GTB  2x10 PTB ea s/l  2x10 GTB ea s/l   marching 2x10 GTB ea h/l 2x10 PTB stand, 2x10 GTB h/l  2x10 GTB stand ea    2x15  "GTB 2x10 ea PTB  2x10 ea PTB h/l 2x10 ea GTB h/l   bridges 2x10  5\" 2x10 5\" w GTB abd 2x10 5\" w GTB abd    2x10 5\" GTB  2x10 5\"  2x10 5\"   LAQ 2x10 2# ea 2x10 2.5# ea 2x10 2.5# ea     2x10 1# ea 2x10 1# ea 2x10 2# ea                                          Ther Ex 3/28 4/2 4/4    3/1 3/14 3/19 3/21   bike 6' 6' 6'    8' 6' 6' 6'   SLR 2x10 1# R, 2# L 2x10 2# R, 2.5# L 2x10 2# R, 2.5# L    2x10 1#  2x10 ea 2x10 1# R, 2# L   S/l hip abd 2x10 1# R, 2# L 2x10 2# R, 2.5# L 2x10 2# R, 2.5# L    2x10 1#  2x10 ea 2x10 1# R, 2# L   Standing hip abd/ext OTB 2x10 ea 2x10 PTB ea 2x10 GTB ea    2x10 ea PTB 2x10 ea w flex 2x10 ea 2x10 OTB ea   sidestepping  4 laps PTB 4 laps GTB    3 laps PTB      Leg press 2x15  165#  BLE 2x15 185# BLE 2x15 185# BLE     2x15 135# BLE  2x15 135# BLE, 2x10 155# BLE 2x15 165# BLE   Monster walks  4 laps PTB 4 laps GTB                       Ther Activity 3/28 4/2 4/5    3/1  3/19 3/21   squats  2x10 2x10     2x10      Step ups 2x10 ea  1R 2x10 ea 1R 2x10 ea 1R    2x10 ea up 1R   2x10 ea up 1R   Gait Training   4/5          hurdles  nv 3 laps ea step to low hurdles                       Modalities                                                              "

## 2024-04-09 ENCOUNTER — TELEPHONE (OUTPATIENT)
Age: 64
End: 2024-04-09

## 2024-04-09 ENCOUNTER — OFFICE VISIT (OUTPATIENT)
Dept: PHYSICAL THERAPY | Facility: CLINIC | Age: 64
End: 2024-04-09
Payer: COMMERCIAL

## 2024-04-09 DIAGNOSIS — R26.2 AMBULATORY DYSFUNCTION: Primary | ICD-10-CM

## 2024-04-09 DIAGNOSIS — Z96.611 S/P REVERSE TOTAL SHOULDER ARTHROPLASTY, RIGHT: Primary | ICD-10-CM

## 2024-04-09 DIAGNOSIS — R29.898 WEAKNESS OF BOTH LOWER EXTREMITIES: ICD-10-CM

## 2024-04-09 PROCEDURE — 97110 THERAPEUTIC EXERCISES: CPT

## 2024-04-09 PROCEDURE — 97112 NEUROMUSCULAR REEDUCATION: CPT

## 2024-04-09 NOTE — TELEPHONE ENCOUNTER
"Caller: Patient     Doctor: María     Reason for call: Patient has good ROM but no strength in right arm. She states it's like \"a noodle\".  She is requesting an order for PT for the right shoulder. Please advise    Call back#: [phone]  "

## 2024-04-09 NOTE — TELEPHONE ENCOUNTER
Called patient @ # on file  No answer  LVM relating Rosie's message   Asked her to call back if she had   any other questions or concerns

## 2024-04-09 NOTE — PROGRESS NOTES
Daily Note     Today's date: 2024  Patient name: Li Torre  : 1960  MRN: [id number]  Referring provider: Derrick Vasquez MD  Dx:   Encounter Diagnosis     ICD-10-CM    1. Ambulatory dysfunction  R26.2       2. Weakness of both lower extremities  R29.898           Start Time: 0955  Stop Time: 1035  Total time in clinic (min): 40 minutes    Subjective: Pt stated that her legs are a little sore today due to walking outside over the past couple days but is feeling good overall.       Objective: See treatment diary below      Assessment: Pt tolerated warm up on bike well at beginning of session without increase in discomfort during or after activity. Pt continues to require mild verbal cueing throughout session to perform activities with proper form. Pt showed slight improvement in BLE strength with progression of strengthening activities but continues to fatigue quickly especially with SLR flexion and abduction activities. Pt could not tolerate progression of height with step up activity well, modified back down to 1 riser. Pt showed slight improvement in step height and length with performance of hurdles with step to and step over step gait pattern, continue to progress towards not using UE for support on banister as tolerated. Pt would benefit from continued skilled PT to improve BLE strength, mobility, gait, balance, and functional ability.     Plan: Continue per plan of care.  Progress treatment as tolerated.       Precautions: S/p R reverse TSA on 10/24/23    Date 3/28 4/2 4/4 4/9   3/1 3/14 3/19 3/21   Visit # 13 14 15 16   9 10 11 12   FOTO          done   Re-eval        DK       Manuals 3/28 4/2 4/4 4/9   3/1 3/14 3/19 3/21                                                       Neuro Re-Ed  4/2 4/4 4/9   3/1 3/14 3/19 3/21   clamshells 2x10 GTB ea s/l 2x10 GTB ea s/l  2x10 GTB ea s/l 2x10 GTB ea s/l   2x15 GTB  2x10 PTB ea s/l  2x10 GTB ea s/l   marching 2x10 GTB ea h/l 2x10 PTB stand, 2x10  "GTB h/l  2x10 GTB stand ea 2x10 BTB h/l ea   2x15 GTB 2x10 ea PTB  2x10 ea PTB h/l 2x10 ea GTB h/l   bridges 2x10  5\" 2x10 5\" w GTB abd 2x10 5\" w GTB abd 2x10 5\" w BTB abd   2x10 5\" GTB  2x10 5\"  2x10 5\"   LAQ 2x10 2# ea 2x10 2.5# ea 2x10 2.5# ea 2x10 2.5# ea    2x10 1# ea 2x10 1# ea 2x10 2# ea                                          Ther Ex 3/28 4/2 4/4 4/9   3/1 3/14 3/19 3/21   bike 6' 6' 6' 6'   8' 6' 6' 6'   SLR 2x10 1# R, 2# L 2x10 2# R, 2.5# L 2x10 2# R, 2.5# L 2x10 2.5# ea   2x10 1#  2x10 ea 2x10 1# R, 2# L   S/l hip abd 2x10 1# R, 2# L 2x10 2# R, 2.5# L 2x10 2# R, 2.5# L 2x10 2.5# ea   2x10 1#  2x10 ea 2x10 1# R, 2# L   Standing hip abd/ext OTB 2x10 ea 2x10 PTB ea 2x10 GTB ea 2x10 BTB ea   2x10 ea PTB 2x10 ea w flex 2x10 ea 2x10 OTB ea   sidestepping  4 laps PTB 4 laps GTB    3 laps PTB      Leg press 2x15  165#  BLE 2x15 185# BLE 2x15 185# BLE  2x15 185# BLE   2x15 135# BLE  2x15 135# BLE, 2x10 155# BLE 2x15 165# BLE   Monster walks  4 laps PTB 4 laps GTB                       Ther Activity 3/28 4/2 4/5 4/9   3/1  3/19 3/21   squats  2x10 2x10     2x10      Step ups 2x10 ea  1R 2x10 ea 1R 2x10 ea 1R 10x R up 2R, 20x ea 1R up   2x10 ea up 1R   2x10 ea up 1R   Gait Training   4/5 4/9         hurdles  nv 3 laps ea step to low hurdles 3 laps ea step to, 3 laps step over low hurdles                      Modalities                                                                "

## 2024-04-10 ENCOUNTER — HOSPITAL ENCOUNTER (OUTPATIENT)
Dept: RADIOLOGY | Facility: MEDICAL CENTER | Age: 64
Discharge: HOME | End: 2024-04-10
Payer: COMMERCIAL

## 2024-04-10 ENCOUNTER — TELEPHONE (OUTPATIENT)
Dept: PAIN MEDICINE | Facility: MEDICAL CENTER | Age: 64
End: 2024-04-10

## 2024-04-10 VITALS
TEMPERATURE: 97.5 F | HEART RATE: 98 BPM | DIASTOLIC BLOOD PRESSURE: 83 MMHG | OXYGEN SATURATION: 98 % | RESPIRATION RATE: 18 BRPM | SYSTOLIC BLOOD PRESSURE: 143 MMHG

## 2024-04-10 DIAGNOSIS — M47.816 LUMBAR SPONDYLOSIS: ICD-10-CM

## 2024-04-10 PROCEDURE — 64636 DESTROY L/S FACET JNT ADDL: CPT | Performed by: PHYSICAL MEDICINE & REHABILITATION

## 2024-04-10 PROCEDURE — 64635 DESTROY LUMB/SAC FACET JNT: CPT | Performed by: PHYSICAL MEDICINE & REHABILITATION

## 2024-04-10 RX ADMIN — Medication 5 ML: at 10:58

## 2024-04-10 NOTE — H&P
"History of Present Illness: The patient is a 63 y.o. female who presents with complaints of left low back pain    Past Medical History:   Diagnosis Date    Adrenal insufficiency (Whatcom's disease) (Formerly [facility])     Anemia 09 15 2022    Was found in bloodwork done on that day    Anxiety     Arthritis     Bilateral pulmonary contusion 07/03/2020    Bipolar disorder     Cervical radiculopathy     Chest pain     seen in ER 9/18, dx with gas    Chronic back pain     Chronic pain disorder     lumbar    Closed head injury with brief loss of consciousness (Formerly [facility]) 10/15/2019    Contusion of right hand 07/03/2021    Depression     DVT, lower extremity (Formerly [facility])     1991 and 2019 now on eliquis    Exercises 5 to 6 times per week     5 days per week with weights/band and also goes to PT 2x/week    Fall down stairs 07/03/2020    Fibromyalgia     Fibromyalgia, primary     GERD (gastroesophageal reflux disease)     Headache(784.0) 2018    I get migraines. See Neurologist for this.    Hematoma of parietal scalp 07/03/2020    History of Clostridioides difficile infection     History of MRSA infection     pt denies having this    History of recent fall 07/2021    \"possibly injured left shoulder'    History of TIAs     cannot remember details    Hypertension     Hypokalemia     Intractable migraine without aura and without status migrainosus 10/02/2019    Left shoulder pain     \"not too bad\" goes to PT 2x/week    Migraine     Obesity 2019    Psychiatric disorder     Anxiety, major depression, bipolar    Spinal stenosis     Syncope 2014    orthostatic hypotension    Wears dentures     upper    Wears glasses        Past Surgical History:   Procedure Laterality Date    APPENDECTOMY      CAST APPLICATION Left 07/04/2020    Procedure: Application short-arm splint;  Surgeon: Sony Zhang MD;  Location: [facility];  Service: Orthopedics    COLONOSCOPY      FL INJECTION LEFT SHOULDER (ARTHROGRAM)  11/10/2021    GASTRIC RESTRICTION SURGERY      " "Gastric Sleeve Nov 2015    HIP SURGERY  2016    Hip Replacement    HYSTERECTOMY      DONALD    JOINT REPLACEMENT      Left Knee 2011 and Right Hip 2010    KNEE SURGERY  2015    Knee Replacement    OOPHORECTOMY      ORIF WRIST FRACTURE Left 07/04/2020    Procedure: Open reduction and internal fixation left radius and ulnar shaft fracture;  Surgeon: Sony Zhang MD;  [facility];  Service: Orthopedics    TX ARTHROPLASTY GLENOHUMERAL JOINT TOTAL SHOULDER Left 03/30/2021    Procedure: ARTHROPLASTY SHOULDER - anatomic;  Surgeon: Daryn Daniel MD;  [facility];  Service: Orthopedics    TX ARTHROPLASTY GLENOHUMERAL JOINT TOTAL SHOULDER Right 10/24/2023    Procedure: ANATOMIC VS REVERSE TOTAL SHOULDER ARTHROPLASTY, WITH BICEPS TENDONESIS;  Surgeon: Daryn Daniel MD;  [facility];  Service: Orthopedics    TX NATIVIDAD SHOULDER ARTHRPLSTY HUMERAL&GLENOID COMPNT Left 12/21/2021    Procedure: REVISION OF TOTAL SHOULDER ARTHROPLASTY TO REVERSE TOTAL SHOULDER ARTHROPLASTY;  Surgeon: Daryn Daniel MD;  Location[facility];  Service: Orthopedics         Current Outpatient Medications:     amLODIPine (NORVASC) 5 mg tablet, Take 5 mg by mouth daily, Disp: , Rfl:     apixaban (Eliquis) 5 mg, Take 1 tablet (5 mg total) by mouth 2 (two) times a day, Disp: 180 tablet, Rfl: 3    ARIPiprazole (ABILIFY) 5 mg tablet, TAKE 1 TABLET EVERY MORNING (CORRECTION BY DR OF PREV SCRIPT), Disp: , Rfl:     atorvastatin (LIPITOR) 40 mg tablet, TAKE 1 TABLET BY MOUTH EVERY DAY AT NIGHT, Disp: , Rfl:     B-D INSULIN SYRINGE 1CC/25GX1\" 25G X 1\" 1 ML MISC, , Disp: , Rfl:     celecoxib (CeleBREX) 200 mg capsule, TAKE 1 CAPSULE BY MOUTH EVERY DAY (Patient not taking: Reported on 3/8/2024), Disp: 30 capsule, Rfl: 1    cholecalciferol (VITAMIN D3) 1,000 units tablet, , Disp: , Rfl:     dihydroergotamine (DHE) 1 mg/mL, INJECT 1 ML (1 MG TOTAL) INJECT INTO THE MUSCLE AS NEEDED FOR MIGRAINE., Disp: , Rfl:     " furosemide (LASIX) 20 mg tablet, Take 20 mg by mouth as needed Pt reports calls her [facility] cardiologist Dr Michel before taking  (Patient not taking: Reported on 3/11/2024), Disp: , Rfl:     gabapentin (NEURONTIN) 600 MG tablet, Take 600 mg by mouth 3 (three) times a day, Disp: , Rfl:     hydrOXYzine HCL (ATARAX) 25 mg tablet, TAKE 2 TABLETS (50 MG TOTAL) BY MOUTH DAILY AT BEDTIME AS NEEDED (INSOMNIA) (Patient not taking: Reported on 3/8/2024), Disp: 30 tablet, Rfl: 0    lamoTRIgine (LaMICtal) 200 MG tablet, Take 200 mg by mouth daily., Disp: , Rfl:     LORazepam (ATIVAN) 0.5 mg tablet, Take 1 tablet (0.5 mg total) by mouth daily as needed for anxiety, Disp: 15 tablet, Rfl: 0    meclizine (ANTIVERT) 12.5 MG tablet, Take 1 tablet (12.5 mg total) by mouth every 8 (eight) hours as needed for dizziness for up to 7 days, Disp: 21 tablet, Rfl: 0    methocarbamol (ROBAXIN) 750 mg tablet, , Disp: , Rfl:     nystatin (MYCOSTATIN) cream, APPLY TO AFFECTED AREA TWICE A DAY, Disp: 30 g, Rfl: 0    ondansetron (ZOFRAN-ODT) 4 mg disintegrating tablet, TAKE 1 TABLET BY MOUTH EVERY 8 HOURS AS NEEDED FOR NAUSEA AND VOMITING, Disp: , Rfl:     potassium chloride (K-DUR,KLOR-CON) 20 mEq tablet, Take 20 mEq by mouth daily  , Disp: , Rfl:     Ubrelvy 100 MG tablet, TAKE 1 TABLET TWICE A DAY AS NEEDED FOR HEADACHE, LIMIT 16 TABS PER MONTH, Disp: , Rfl:     Allergies   Allergen Reactions    Ketorolac Itching and Other (See Comments)     [toradol] Itching, hives    Mushroom Extract Complex - Food Allergy Hives    Oxycodone Other (See Comments)     Irritable ,severe mood change        Physical Exam:   Vitals:    04/10/24 1041   BP: 147/82   Pulse: 92   Resp: 18   Temp: 97.5 °F (36.4 °C)   SpO2: 99%     General: Awake, Alert, Oriented x 3, Mood and affect appropriate  Respiratory: Respirations even and unlabored  Cardiovascular: Peripheral pulses intact; no edema  Musculoskeletal Exam: left low back pain    ASA Score: 3    Patient/Chart  Verification  Patient ID Verified: Verbal  ID Band Applied: No  Consents Confirmed: Procedural  H&P( within 30 days) Verified: To be obtained in the Pre-Procedure area  Interval H&P(within 24 hr) Complete (required for Outpatients and Surgery Admit only): To be obtained in the Pre-Procedure area  Allergies Reviewed: Yes  Anticoag/NSAID held?: NA  Currently on antibiotics?: No  Pregnancy denied?: NA    Assessment:   1. Lumbar spondylosis        Plan: LT L3-5 RFA ([id number], [id number])

## 2024-04-10 NOTE — DISCHARGE INSTRUCTIONS

## 2024-04-11 ENCOUNTER — APPOINTMENT (OUTPATIENT)
Dept: PHYSICAL THERAPY | Facility: CLINIC | Age: 64
End: 2024-04-11
Payer: COMMERCIAL

## 2024-04-11 NOTE — TELEPHONE ENCOUNTER
"S/W pt. Pt stated current pain level is 0/10. Pt stated needle sites look good, denies S&S of infection, denies fevers, denies soreness and denies sun burn like sensation.  Advised pt if she does get pain to take her prescribed or OTC pain medications and/or use ice/heat and that it takes 4 to 6 weeks to see the full effect.  Confirmed next appt w/ pt.  Pt stated she is \"feeling good.\"  Pt verbalized understanding.    "

## 2024-04-12 ENCOUNTER — APPOINTMENT (OUTPATIENT)
Dept: PHYSICAL THERAPY | Facility: CLINIC | Age: 64
End: 2024-04-12
Payer: COMMERCIAL

## 2024-04-16 ENCOUNTER — OFFICE VISIT (OUTPATIENT)
Dept: PHYSICAL THERAPY | Facility: CLINIC | Age: 64
End: 2024-04-16
Payer: COMMERCIAL

## 2024-04-16 DIAGNOSIS — R26.2 AMBULATORY DYSFUNCTION: Primary | ICD-10-CM

## 2024-04-16 DIAGNOSIS — R29.898 WEAKNESS OF BOTH LOWER EXTREMITIES: ICD-10-CM

## 2024-04-16 PROCEDURE — 97110 THERAPEUTIC EXERCISES: CPT

## 2024-04-16 PROCEDURE — 97112 NEUROMUSCULAR REEDUCATION: CPT

## 2024-04-16 NOTE — PROGRESS NOTES
"Daily Note     Today's date: 2024  Patient name: Li Torre  : 1960  MRN: [id number]  Referring provider: Derrick Vasquez MD  Dx:   Encounter Diagnosis     ICD-10-CM    1. Ambulatory dysfunction  R26.2       2. Weakness of both lower extremities  R29.898           Start Time: 1000  Stop Time: 1045  Total time in clinic (min): 45 minutes    Subjective: Pt stated that she is doing a little better today and stated that she got another script for PT for her shoulder.       Objective: See treatment diary below      Assessment: Pt tolerated warm up on bike well at beginning of session without increase in discomfort during or after activity. Pt performed BLE strengthening activities well throughout session with mild fatigue. Pt showed slight improvement in RLE hip flexion strength with performance of R hip SLR activity and would benefit from continued progression of repetitions or resistance next visit. Potential re evaluation next visit to add shoulder. Pt would benefit from continued skilled PT to improve L/S mobility, core stability, BLE strength, flexibility, and functional ability.     Plan: Continue per plan of care.  Progress treatment as tolerated.       Precautions: S/p R reverse TSA on 10/24/23    Date 3/28 4/2 4/4 4/9 4/16  3/1 3/14 3/19 3/21   Visit # 13 14 15 16 17  9 10 11 12   FOTO          done   Re-eval        DK       Manuals 3/28 4/2 4/4 4/9   3/1 3/14 3/19 3/21                                                       Neuro Re-Ed  4/2 4/4 4/9 4/16  3/1 3/14 3/19 3/21   clamshells 2x10 GTB ea s/l 2x10 GTB ea s/l  2x10 GTB ea s/l 2x10 GTB ea s/l 2x10 BTB ea s/l  2x15 GTB  2x10 PTB ea s/l  2x10 GTB ea s/l   marching 2x10 GTB ea h/l 2x10 PTB stand, 2x10 GTB h/l  2x10 GTB stand ea 2x10 BTB h/l ea 2x10 BTB h/l ea  2x15 GTB 2x10 ea PTB  2x10 ea PTB h/l 2x10 ea GTB h/l   bridges 2x10  5\" 2x10 5\" w GTB abd 2x10 5\" w GTB abd 2x10 5\" w BTB abd 2x10 5\" w BTB abde  2x10 5\" GTB  2x10 5\"  2x10 5\" "   LAQ 2x10 2# ea 2x10 2.5# ea 2x10 2.5# ea 2x10 2.5# ea 2x10 2.5# ea SAQ   2x10 1# ea 2x10 1# ea 2x10 2# ea                                          Ther Ex 3/28 4/2 4/4 4/9 4/16  3/1 3/14 3/19 3/21   bike 6' 6' 6' 6' 6'  8' 6' 6' 6'   SLR 2x10 1# R, 2# L 2x10 2# R, 2.5# L 2x10 2# R, 2.5# L 2x10 2.5# ea 2x10 2.5# ea  2x10 1#  2x10 ea 2x10 1# R, 2# L   S/l hip abd 2x10 1# R, 2# L 2x10 2# R, 2.5# L 2x10 2# R, 2.5# L 2x10 2.5# ea 2x10 2.5# ea  2x10 1#  2x10 ea 2x10 1# R, 2# L   Standing hip abd/ext OTB 2x10 ea 2x10 PTB ea 2x10 GTB ea 2x10 BTB ea 2x10 BTB ea  2x10 ea PTB 2x10 ea w flex 2x10 ea 2x10 OTB ea   sidestepping  4 laps PTB 4 laps GTB  4 laps BTB  3 laps PTB      Leg press 2x15  165#  BLE 2x15 185# BLE 2x15 185# BLE  2x15 185# BLE 2x15 185# BLE  2x15 135# BLE  2x15 135# BLE, 2x10 155# BLE 2x15 165# BLE   Monster walks  4 laps PTB 4 laps GTB  4 laps BTB                     Ther Activity 3/28 4/2 4/5 4/9 4/16  3/1  3/19 3/21   squats  2x10 2x10     2x10      Step ups 2x10 ea  1R 2x10 ea 1R 2x10 ea 1R 10x R up 2R, 20x ea 1R up np  2x10 ea up 1R   2x10 ea up 1R   Gait Training   4/5 4/9         hurdles  nv 3 laps ea step to low hurdles 3 laps ea step to, 3 laps step over low hurdles nv                     Modalities

## 2024-04-18 ENCOUNTER — EVALUATION (OUTPATIENT)
Dept: PHYSICAL THERAPY | Facility: CLINIC | Age: 64
End: 2024-04-18
Payer: COMMERCIAL

## 2024-04-18 DIAGNOSIS — Z96.611 S/P REVERSE TOTAL SHOULDER ARTHROPLASTY, RIGHT: ICD-10-CM

## 2024-04-18 PROCEDURE — 97164 PT RE-EVAL EST PLAN CARE: CPT

## 2024-04-18 PROCEDURE — 97110 THERAPEUTIC EXERCISES: CPT

## 2024-04-18 NOTE — PROGRESS NOTES
Re-evaluation     Today's date: 2024  Patient name: Li Torre  : 1960  MRN: [id number]  Referring provider: Derrick Vasquez MD  Dx:   Encounter Diagnosis     ICD-10-CM    1. S/P reverse total shoulder arthroplasty, right  Z96.611 Ambulatory Referral to Physical Therapy                     Subjective: Pt presents to the clinic today for re-evaluation to add R shoulder to PT POC. Pt stated that in terms of motion her R shoulder is doing well after her R reverse TSA, pt stated that she has been having difficulty holding things and reaching up to get things from her cabinet with her RUE. Pt stated that she has been using her RUE daily for ADLs but can tell that it is much weaker than the LUE.       Objective: See treatment diary below    Active Range of Motion   Left Shoulder   Flexion: 175 degrees   Abduction: 125 degrees   ER: 80 degrees  IR: 70 degrees     Right Shoulder   Flexion: 170 degrees   Abduction: 125 degrees   External rotation 45°: 80 degrees   Internal rotation 45: 45 degrees     Passive Range of Motion   Left Shoulder   Abduction: 165 degrees   External rotation 45°: 70 degrees      Right Shoulder   Abduction: 150 degrees  External rotation 45°: 40 degrees      Scapular Mobility   Left Shoulder   Scapular mobility: good     Right Shoulder   Scapular mobility: good     Strength/Myotome Testing      Left Shoulder      Planes of Motion   Flexion: 3+   Abduction: 4  External rotation at 0°: 4-   Internal rotation at 0°: 4      Right Shoulder      Planes of Motion   Flexion: 4   Abduction: 4   External rotation at 0°: 3+  Internal rotation at 0:  3       Assessment: Discussed with pt thoroughly MD protocol for R reverse TSA, educated pt that since the pt does not have a true rotator cuff anymore that working on RTC strengthening does not make anatomical sense and has shown an increased chance of acromial stress fractures, pt understood instructions and treatment sessions will follow  "reverse TSA protocol closely. Pt demonstrated decreased b/l shoulder strength with increased deficits noted on LUE, decreased R active abduction ROM, slightly decreased b/l scapular mobility, and difficulty understanding protocol and pt would benefit from formal skilled physical therapy to improve b/l shoulder strength, AROM, mobility, ADLs, and functional ability.     Goals:  Pt will improve R shoulder ROM to WNL.  Pt will improve pain with ADLs to 2/10 or less.  Pt will improve b/l shoulder flexion and abduction by 2 grades to improve functional ability.       Plan:   Treatment session frequency:  2x per week for 6 weeks.     Continue per plan of care.  Progress treatment as tolerated.   Progress treament per protocol.      Precautions: S/p R reverse TSA on 10/24/23.  As per MD: biceps/triceps strengthening up to her weight restriction (25#).  Light deltoid and periscapular strength to tolerance.  No overhead weights. No RTC strengthening     Date 3/28 4/2 4/4 4/9 4/16 4/18    3/21   Visit # 13 14 15 16 17 18    12   FOTO          done   Re-eval      DK         Manuals 3/28 4/2 4/4 4/9  4/18                                                           Neuro Re-Ed  4/2 4/4 4/9 4/16 4/18       clamshells 2x10 GTB ea s/l 2x10 GTB ea s/l  2x10 GTB ea s/l 2x10 GTB ea s/l 2x10 BTB ea s/l        marching 2x10 GTB ea h/l 2x10 PTB stand, 2x10 GTB h/l  2x10 GTB stand ea 2x10 BTB h/l ea 2x10 BTB h/l ea        bridges 2x10  5\" 2x10 5\" w GTB abd 2x10 5\" w GTB abd 2x10 5\" w BTB abd 2x10 5\" w BTB abde        LAQ 2x10 2# ea 2x10 2.5# ea 2x10 2.5# ea 2x10 2.5# ea 2x10 2.5# ea SAQ        Scap punches                          Pt Edu      DK       Ther Ex 3/28 4/2 4/4 4/9 4/16 4/18       bike 6' 6' 6' 6' 6'        SLR 2x10 1# R, 2# L 2x10 2# R, 2.5# L 2x10 2# R, 2.5# L 2x10 2.5# ea 2x10 2.5# ea        S/l hip abd 2x10 1# R, 2# L 2x10 2# R, 2.5# L 2x10 2# R, 2.5# L 2x10 2.5# ea 2x10 2.5# ea        Standing hip abd/ext OTB 2x10 ea 2x10 " PTB ea 2x10 GTB ea 2x10 BTB ea 2x10 BTB ea        sidestepping  4 laps PTB 4 laps GTB  4 laps BTB        Leg press 2x15  165#  BLE 2x15 185# BLE 2x15 185# BLE  2x15 185# BLE 2x15 185# BLE        Monster walks  4 laps PTB 4 laps GTB  4 laps BTB        Shoulder raises      2x10 2# flex, abd to 90       Rows/exts      20x GTB ea       Bicep curls             Tricep exts             Wall slides abd                                       Ther Activity 3/28 4/2 4/5 4/9 4/16 4/18       squats  2x10 2x10           Step ups 2x10 ea  1R 2x10 ea 1R 2x10 ea 1R 10x R up 2R, 20x ea 1R up np        Gait Training   4/5 4/9         hurdles  nv 3 laps ea step to low hurdles 3 laps ea step to, 3 laps step over low hurdles nv                     Modalities

## 2024-04-23 ENCOUNTER — OFFICE VISIT (OUTPATIENT)
Dept: PHYSICAL THERAPY | Facility: CLINIC | Age: 64
End: 2024-04-23
Payer: COMMERCIAL

## 2024-04-23 DIAGNOSIS — R29.898 WEAKNESS OF BOTH LOWER EXTREMITIES: ICD-10-CM

## 2024-04-23 DIAGNOSIS — F31.9 BIPOLAR DEPRESSION (HCC): ICD-10-CM

## 2024-04-23 DIAGNOSIS — R26.2 AMBULATORY DYSFUNCTION: ICD-10-CM

## 2024-04-23 DIAGNOSIS — Z96.611 S/P REVERSE TOTAL SHOULDER ARTHROPLASTY, RIGHT: Primary | ICD-10-CM

## 2024-04-23 PROCEDURE — 97110 THERAPEUTIC EXERCISES: CPT

## 2024-04-23 PROCEDURE — 97112 NEUROMUSCULAR REEDUCATION: CPT

## 2024-04-23 NOTE — TELEPHONE ENCOUNTER
Reason for call:   [x] Refill   [] Prior Auth  [] Other:     Office:   [x] PCP/Provider - Derrick Vasquez MD  PCP - General  [] Specialty/Provider -     Medication: LORazepam (ATIVAN) 0.5 mg tablet - take one tab every day prn # 15      Pharmacy: [id number] - [place], [place] - [address] [place] [address]     Does the patient have enough for 3 days?   [] Yes   [x] No - Send as HP to POD

## 2024-04-23 NOTE — PROGRESS NOTES
Daily Note     Today's date: 2024  Patient name: Li Torre  : 1960  MRN: [id number]  Referring provider: Derrick Vasquez MD  Dx:   Encounter Diagnosis     ICD-10-CM    1. S/P reverse total shoulder arthroplasty, right  Z96.611       2. Ambulatory dysfunction  R26.2       3. Weakness of both lower extremities  R29.898           Start Time: 0945  Stop Time: 1030  Total time in clinic (min): 45 minutes    Subjective: Pt stated that she was doing some of the exercises on her HEP for her R shoulder and stated that she has some muscle soreness but no pain in her shoulder.      Objective: See treatment diary below      Assessment: Pt tolerated warm up on bike well at beginning of session without increase in discomfort during or after activity. Re-educated pt on proper form of shoulder strengthening and mobility activities and to cease all activity if she has pain with performance, pt understood education well. Pt was given increased resistance bands for BLE strengthening activities at home for HEP and given new resistance band for scapular activities as band broke. Pt had mild pulling sensation with discomfort when performing gentle R shoulder abduction ROM activity at finger ladder, held on other shoulder mobility activities post performance. Pt would benefit from continued skilled PT to improve BLE strength, RUE mobility, flexibility, scapular stability, and functional ability.     Plan: Continue per plan of care.  Progress treatment as tolerated.       Precautions: S/p R reverse TSA on 10/24/23.  As per MD: biceps/triceps strengthening up to her weight restriction (25#).  Light deltoid and periscapular strength to tolerance.  No overhead weights. No RTC strengthening     Date 3/28 4/2 4/4 4/9 4/16 4/18 4/23   3/21   Visit # 13 14 15 16 17 18 19   12   FOTO          done   Re-eval      DK         Manuals 3/28 4/2 4/4 4/9  4/18 4/23                                                          Neuro Re-Ed  " 4/2 4/4 4/9 4/16 4/18 4/23      clamshells 2x10 GTB ea s/l 2x10 GTB ea s/l  2x10 GTB ea s/l 2x10 GTB ea s/l 2x10 BTB ea s/l  2x10 BTB s/l ea      marching 2x10 GTB ea h/l 2x10 PTB stand, 2x10 GTB h/l  2x10 GTB stand ea 2x10 BTB h/l ea 2x10 BTB h/l ea  2x10 BTB h/l ea      bridges 2x10  5\" 2x10 5\" w GTB abd 2x10 5\" w GTB abd 2x10 5\" w BTB abd 2x10 5\" w BTB abde  2x10 5\" w BTB abd      LAQ 2x10 2# ea 2x10 2.5# ea 2x10 2.5# ea 2x10 2.5# ea 2x10 2.5# ea SAQ  2x10 2.5# ea      Scap punches                          Pt Edu      DK       Ther Ex 3/28 4/2 4/4 4/9 4/16 4/18 4/23      bike 6' 6' 6' 6' 6'  6'      SLR 2x10 1# R, 2# L 2x10 2# R, 2.5# L 2x10 2# R, 2.5# L 2x10 2.5# ea 2x10 2.5# ea  2x10 2.5# ea      S/l hip abd 2x10 1# R, 2# L 2x10 2# R, 2.5# L 2x10 2# R, 2.5# L 2x10 2.5# ea 2x10 2.5# ea  2x10 2.5# ea      Standing hip abd/ext OTB 2x10 ea 2x10 PTB ea 2x10 GTB ea 2x10 BTB ea 2x10 BTB ea  2x10 BTB ea      sidestepping  4 laps PTB 4 laps GTB  4 laps BTB  4 laps BTB       Leg press 2x15  165#  BLE 2x15 185# BLE 2x15 185# BLE  2x15 185# BLE 2x15 185# BLE  2x15 205# BLE      Monster walks  4 laps PTB 4 laps GTB  4 laps BTB  4 laps BTB      Shoulder raises      2x10 2# flex, abd to 90       Rows/exts      20x GTB ea 2x15 BTB ea      Bicep curls       2x10 4#      Tricep exts       2x10 GTB      Wall slides abd             Finger ladder       5x5\" abd*                   Ther Activity 3/28 4/2 4/5 4/9 4/16 4/18 4/23      squats  2x10 2x10           Step ups 2x10 ea  1R 2x10 ea 1R 2x10 ea 1R 10x R up 2R, 20x ea 1R up np  10x ea 1R, 10x ea 2R       Gait Training   4/5 4/9         hurdles  nv 3 laps ea step to low hurdles 3 laps ea step to, 3 laps step over low hurdles nv                     Modalities                                                                      "

## 2024-04-24 ENCOUNTER — PATIENT OUTREACH (OUTPATIENT)
Dept: FAMILY MEDICINE CLINIC | Facility: CLINIC | Age: 64
End: 2024-04-24

## 2024-04-24 RX ORDER — LORAZEPAM 0.5 MG/1
0.5 TABLET ORAL DAILY PRN
Qty: 15 TABLET | Refills: 0 | Status: SHIPPED | OUTPATIENT
Start: 2024-04-24 | End: 2024-05-17 | Stop reason: SDUPTHER

## 2024-04-24 NOTE — PROGRESS NOTES
90 day chart review.  Patient has had no utilization in the last 90 days. She  is attending appointments and going to outpatient PT for R shoulder.

## 2024-04-25 ENCOUNTER — OFFICE VISIT (OUTPATIENT)
Dept: PHYSICAL THERAPY | Facility: CLINIC | Age: 64
End: 2024-04-25
Payer: COMMERCIAL

## 2024-04-25 DIAGNOSIS — R26.2 AMBULATORY DYSFUNCTION: ICD-10-CM

## 2024-04-25 DIAGNOSIS — Z96.611 S/P REVERSE TOTAL SHOULDER ARTHROPLASTY, RIGHT: Primary | ICD-10-CM

## 2024-04-25 DIAGNOSIS — R29.898 WEAKNESS OF BOTH LOWER EXTREMITIES: ICD-10-CM

## 2024-04-25 PROCEDURE — 97112 NEUROMUSCULAR REEDUCATION: CPT

## 2024-04-25 PROCEDURE — 97110 THERAPEUTIC EXERCISES: CPT

## 2024-04-25 NOTE — PROGRESS NOTES
Daily Note     Today's date: 2024  Patient name: Li Torre  : 1960  MRN: [id number]  Referring provider: Derrick Vasquez MD  Dx:   Encounter Diagnosis     ICD-10-CM    1. S/P reverse total shoulder arthroplasty, right  Z96.611       2. Ambulatory dysfunction  R26.2       3. Weakness of both lower extremities  R29.898           Start Time: 955  Stop Time: 1040  Total time in clinic (min): 45 minutes    Subjective: Pt stated that she has some soreness in her thighs yesterday during the day but other than that has been doing ok. Pt stated that she had a hard time sleeping last night due to insomnia.      Objective: See treatment diary below      Assessment: Pt tolerated warm up on bike well at beginning of session without increase in discomfort during or after activity. Pt required mild verbal and tactile cueing to perform s/l abduction activity with proper form as pt had tendency to roll backwards as compensation for weak b/l hip abductors, pt adapted to instruction fairly well but showed increased fatigue and reverted back to compensation without tactile cueing. Pt was able to complete BLE strengthening activities well throughout session with slightly increased resistance with mild fatigues post session. Pt tolerated scapular strengthening and R shoulder mobility activities well today without discomfort. Pt would benefit from continued skilled PT to improve R scapular mobility, scapular stability, flexibility, BLE strength, gait, and functional ability.       Plan: Continue per plan of care.  Progress treatment as tolerated.       Precautions: S/p R reverse TSA on 10/24/23.  As per MD: biceps/triceps strengthening up to her weight restriction (25#).  Light deltoid and periscapular strength to tolerance.  No overhead weights. No RTC strengthening     Date 3/28 4/2 4/4 4/9 4/16 4/18 4/23 4/25  3/21   Visit # 13 14 15 16 17 18 19 20  12   FOTO          done   Re-eval      DK         Manuals 3/28  "4/2 4/4 4/9 4/18 4/23 4/25                                                         Neuro Re-Ed  4/2 4/4 4/9 4/16 4/18 4/23 4/25     clamshells 2x10 GTB ea s/l 2x10 GTB ea s/l  2x10 GTB ea s/l 2x10 GTB ea s/l 2x10 BTB ea s/l  2x10 BTB s/l ea 2x10 BTB s/l ea     marching 2x10 GTB ea h/l 2x10 PTB stand, 2x10 GTB h/l  2x10 GTB stand ea 2x10 BTB h/l ea 2x10 BTB h/l ea  2x10 BTB h/l ea 2x10 BTB h/l ea     bridges 2x10  5\" 2x10 5\" w GTB abd 2x10 5\" w GTB abd 2x10 5\" w BTB abd 2x10 5\" w BTB abde  2x10 5\" w BTB abd 2x10 5\" w BTB abd     LAQ 2x10 2# ea 2x10 2.5# ea 2x10 2.5# ea 2x10 2.5# ea 2x10 2.5# ea SAQ  2x10 2.5# ea 2x10 3# ea     Scap punches                          Pt Edu      DK       Ther Ex 3/28 4/2 4/4 4/9 4/16 4/18 4/23 4/25     bike 6' 6' 6' 6' 6'  6' 6'     SLR 2x10 1# R, 2# L 2x10 2# R, 2.5# L 2x10 2# R, 2.5# L 2x10 2.5# ea 2x10 2.5# ea  2x10 2.5# ea 2x10 3# ea     S/l hip abd 2x10 1# R, 2# L 2x10 2# R, 2.5# L 2x10 2# R, 2.5# L 2x10 2.5# ea 2x10 2.5# ea  2x10 2.5# ea 2x10 3# ea     Standing hip abd/ext OTB 2x10 ea 2x10 PTB ea 2x10 GTB ea 2x10 BTB ea 2x10 BTB ea  2x10 BTB ea 2x10 BTB ea     sidestepping  4 laps PTB 4 laps GTB  4 laps BTB  4 laps BTB  5 laps BTB     Leg press 2x15  165#  BLE 2x15 185# BLE 2x15 185# BLE  2x15 185# BLE 2x15 185# BLE  2x15 205# BLE 2x15 205# BLE     Monster walks  4 laps PTB 4 laps GTB  4 laps BTB  4 laps BTB 5 laps BTB     Shoulder raises      2x10 2# flex, abd to 90       Rows/exts      20x GTB ea 2x15 BTB ea 2x15 black ea     Bicep curls       2x10 4# 2x10 5#  ea     Tricep exts       2x10 GTB 2x10 BTB     Wall slides abd        10x3\"      Finger ladder       5x5\" abd*                   Ther Activity 3/28 4/2 4/5 4/9 4/16 4/18 4/23 4/25     squats  2x10 2x10           Step ups 2x10 ea  1R 2x10 ea 1R 2x10 ea 1R 10x R up 2R, 20x ea 1R up np  10x ea 1R, 10x ea 2R       Gait Training   4/5 4/9         hurdles  nv 3 laps ea step to low hurdles 3 laps ea step to, 3 laps step over low " hurdles nv                     Modalities

## 2024-04-30 ENCOUNTER — APPOINTMENT (OUTPATIENT)
Dept: PHYSICAL THERAPY | Facility: CLINIC | Age: 64
End: 2024-04-30
Payer: COMMERCIAL

## 2024-05-01 PROBLEM — J01.81 OTHER ACUTE RECURRENT SINUSITIS: Status: RESOLVED | Noted: 2024-03-08 | Resolved: 2024-05-01

## 2024-05-02 ENCOUNTER — OFFICE VISIT (OUTPATIENT)
Dept: PHYSICAL THERAPY | Facility: CLINIC | Age: 64
End: 2024-05-02
Payer: COMMERCIAL

## 2024-05-02 DIAGNOSIS — R29.898 WEAKNESS OF BOTH LOWER EXTREMITIES: ICD-10-CM

## 2024-05-02 DIAGNOSIS — R26.2 AMBULATORY DYSFUNCTION: ICD-10-CM

## 2024-05-02 DIAGNOSIS — Z96.611 S/P REVERSE TOTAL SHOULDER ARTHROPLASTY, RIGHT: Primary | ICD-10-CM

## 2024-05-02 PROCEDURE — 97110 THERAPEUTIC EXERCISES: CPT

## 2024-05-02 PROCEDURE — 97112 NEUROMUSCULAR REEDUCATION: CPT

## 2024-05-02 NOTE — PROGRESS NOTES
Daily Note     Today's date: 2024  Patient name: Li Torre  : 1960  MRN: [id number]  Referring provider: Derrick Vasquez MD  Dx:   Encounter Diagnosis     ICD-10-CM    1. S/P reverse total shoulder arthroplasty, right  Z96.611       2. Ambulatory dysfunction  R26.2       3. Weakness of both lower extremities  R29.898           Start Time: 09  Stop Time: 1030  Total time in clinic (min): 45 minutes    Subjective: Pt stated that her legs are feeling a little weak today but her shoulder is feeling a lot better over the past week. Pt stated that she is also still dealing with a stomach bug but is better today compared to earlier in the week.       Objective: See treatment diary below      Assessment: Pt tolerated warm up on bike well at beginning of session without increase in discomfort during or after activity. Pt required mild verbal cueing to perform SLR and clamshells with proper form but adapted to instruction well. Pt continues to have slight difficulty with resisted R hip flexion activities with mild fatigue and soreness post performance. Pt had slightly increased lateral sway when ambulating small distances in the clinic compared to last distance but no instances of LOB. Pt tolerated scapular and ROM activities well with mild improvement in stiffness post performance. Pt would benefit from continued skilled PT to improve R shoulder mobility ROM, BLE strength, balance, gait, and functional ability.       Plan: Continue per plan of care.  Progress treatment as tolerated.       Precautions: S/p R reverse TSA on 10/24/23.  As per MD: biceps/triceps strengthening up to her weight restriction (25#).  Light deltoid and periscapular strength to tolerance.  No overhead weights. No RTC strengthening     Date 3/28 4/2 4/4 4/9 4/16 4/18 4/23 4/25 5/2    Visit # 13 14 15 16 17 18 19 20 21    FOTO         Done (d/c)    Re-eval      ASA         Manuals 3/28 4/2 4/4 4/9  4/18 4/23 4/25 5/2                "                                         Neuro Re-Ed  4/2 4/4 4/9 4/16 4/18 4/23 4/25 5/2    clamshells 2x10 GTB ea s/l 2x10 GTB ea s/l  2x10 GTB ea s/l 2x10 GTB ea s/l 2x10 BTB ea s/l  2x10 BTB s/l ea 2x10 BTB s/l ea 2x10 BTB s/l ea    marching 2x10 GTB ea h/l 2x10 PTB stand, 2x10 GTB h/l  2x10 GTB stand ea 2x10 BTB h/l ea 2x10 BTB h/l ea  2x10 BTB h/l ea 2x10 BTB h/l ea 2x10 BTB h/l ea    bridges 2x10  5\" 2x10 5\" w GTB abd 2x10 5\" w GTB abd 2x10 5\" w BTB abd 2x10 5\" w BTB abde  2x10 5\" w BTB abd 2x10 5\" w BTB abd 2x10 5\" w BTB abd    LAQ 2x10 2# ea 2x10 2.5# ea 2x10 2.5# ea 2x10 2.5# ea 2x10 2.5# ea SAQ  2x10 2.5# ea 2x10 3# ea 2x10 3# ea    Scap punches                          Pt Edu      DK       Ther Ex 3/28 4/2 4/4 4/9 4/16 4/18 4/23 4/25 5/2    bike 6' 6' 6' 6' 6'  6' 6' 6'    SLR 2x10 1# R, 2# L 2x10 2# R, 2.5# L 2x10 2# R, 2.5# L 2x10 2.5# ea 2x10 2.5# ea  2x10 2.5# ea 2x10 3# ea 2x10 3# ea    S/l hip abd 2x10 1# R, 2# L 2x10 2# R, 2.5# L 2x10 2# R, 2.5# L 2x10 2.5# ea 2x10 2.5# ea  2x10 2.5# ea 2x10 3# ea 2x10 3# ea    Standing hip abd/ext OTB 2x10 ea 2x10 PTB ea 2x10 GTB ea 2x10 BTB ea 2x10 BTB ea  2x10 BTB ea 2x10 BTB ea 2x10 BTB ea    sidestepping  4 laps PTB 4 laps GTB  4 laps BTB  4 laps BTB  5 laps BTB 4 laps BTB    Leg press 2x15  165#  BLE 2x15 185# BLE 2x15 185# BLE  2x15 185# BLE 2x15 185# BLE  2x15 205# BLE 2x15 205# BLE 2x15 205# BLE    Monster walks  4 laps PTB 4 laps GTB  4 laps BTB  4 laps BTB 5 laps BTB 4 laps BTB    Shoulder raises      2x10 2# flex, abd to 90       Rows/exts      20x GTB ea 2x15 BTB ea 2x15 black ea 2x15 black ea    Bicep curls       2x10 4# 2x10 5#  ea 2x10 5# ea    Tricep exts       2x10 GTB 2x10 BTB 2x10 BTB     Wall slides abd        10x3\"  10x3\"     Finger ladder       5x5\" abd*                   Ther Activity 3/28 4/2 4/5 4/9 4/16 4/18 4/23 4/25 5/2    squats  2x10 2x10           Step ups 2x10 ea  1R 2x10 ea 1R 2x10 ea 1R 10x R up 2R, 20x ea 1R up np  10x ea 1R, " 10x ea 2R       Gait Training   4/5 4/9         hurdles  nv 3 laps ea step to low hurdles 3 laps ea step to, 3 laps step over low hurdles nv                     Modalities

## 2024-05-04 NOTE — PROGRESS NOTES
Progress Note - Brooklynn Kaminski 1960, 62 y o  female MRN: [id number]    Unit/Bed#: [place] [address] -01 Encounter: [id number]    Primary Care Provider: Edgar Miller DO   Date and time admitted to hospital: 6/27/2020 12:25 PM        * Syncope  Assessment & Plan  · Presents after a syncopal event which occurred when she went from a sitting position to a standing position- secondary to orthostatic hypotension + on admission, now resolved with IVFs- component of dehydration contributing  · CT head- no acute intracranial abnormality  · trops negative   · Recent TSH within normal limits  · Hypokalemic with potassium of 2 6  Continue to replete  · repeat orthostatic BP yesterday:  Lying blood pressure 133/72, sitting 125/84; standing 127/85  · Dc IVFS    Migraine  Assessment & Plan  · Hx of migraines with high utilizer plan  · Pt requesting neurology evaluation- will order     Class 2 obesity due to excess calories with body mass index (BMI) of 35 0 to 35 9 in adult  Assessment & Plan  · BMI 35 9-  Noted  · Encourage wt loss and diet     PE (pulmonary thromboembolism) (HCC)  Assessment & Plan  · Hx of PE/DVT anticoagulated on eliquis    History of TIAs  Assessment & Plan  · Maintained on Eliquis  Hypokalemia  Assessment & Plan  · Presented with potassium of 2 6  · Likely secondary to poor oral intake and history of gastric bypass 2015  · Potassium repleted     Bipolar depression ([facility] [address] )  Assessment & Plan  · Continue home meds  · Stable       VTE Pharmacologic Prophylaxis:   Pharmacologic: Apixaban (Eliquis)  Mechanical VTE Prophylaxis in Place: No    Patient Centered Rounds: I have performed bedside rounds with nursing staff today  Discussions with Specialists or Other Care Team Provider: d/w RN     Education and Discussions with Family / Patient: d/w patient     Time Spent for Care: 30 minutes  More than 50% of total time spent on counseling and coordination of care as described above      Current Length of Stay: 1 day(s)    Current Patient Status: Inpatient   Certification Statement: The patient will continue to require additional inpatient hospital stay due to pendign neuro eval     Discharge Plan: pending neuro eval for migraines- likely d/c in am     Code Status: Level 1 - Full Code      Subjective:   Pt reporting 9/10 migraine  Photosensitive  + Nausea  Denies all other complaints     Objective:     Vitals:   Temp (24hrs), Av 5 °F (36 9 °C), Min:98 3 °F (36 8 °C), Max:98 6 °F (37 °C)    Temp:  [98 3 °F (36 8 °C)-98 6 °F (37 °C)] 98 6 °F (37 °C)  HR:  [75-81] 75  Resp:  [18] 18  BP: (133-158)/(72-84) 155/84  SpO2:  [94 %-95 %] 95 %  Body mass index is 35 93 kg/m²  Input and Output Summary (last 24 hours):     No intake or output data in the 24 hours ending 20 1323    Physical Exam:     Physical Exam   Constitutional: She is oriented to person, place, and time  No distress  obesity   Neck: Neck supple  Cardiovascular: Normal rate, regular rhythm, normal heart sounds and intact distal pulses  No murmur heard  Pulmonary/Chest: Effort normal and breath sounds normal  No respiratory distress  She has no wheezes  She has no rales  Abdominal: Soft  Bowel sounds are normal  She exhibits no distension  There is no tenderness  There is no rebound  Musculoskeletal: She exhibits no edema  Neurological: She is alert and oriented to person, place, and time  No cranial nerve deficit  Skin: Skin is warm and dry  No rash noted  She is not diaphoretic  No erythema  Psychiatric: She has a normal mood and affect           Additional Data:     Labs:    Results from last 7 days   Lab Units 20  1843 20  1400   WBC Thousand/uL  --  8 73   HEMOGLOBIN g/dL  --  13 6   HEMATOCRIT %  --  41 2   PLATELETS Thousands/uL 296 230   NEUTROS PCT %  --  65   LYMPHS PCT %  --  24   MONOS PCT %  --  8   EOS PCT %  --  1     Results from last 7 days   Lab Units 20  0058   SODIUM mmol/L 147*   POTASSIUM mmol/L 3 5   CHLORIDE mmol/L 112*   CO2 mmol/L 28   BUN mg/dL 14   CREATININE mg/dL 1 08   ANION GAP mmol/L 7   CALCIUM mg/dL 8 4   ALBUMIN g/dL 2 3*   TOTAL BILIRUBIN mg/dL 0 15*   ALK PHOS U/L 126*   ALT U/L 36   AST U/L 34   GLUCOSE RANDOM mg/dL 108                           * I Have Reviewed All Lab Data Listed Above  * Additional Pertinent Lab Tests Reviewed: All Labs Within Last 24 Hours Reviewed    Imaging:    Imaging Reports Reviewed Today Include:  CTH, chest xray     Recent Cultures (last 7 days):           Last 24 Hours Medication List:     Current Facility-Administered Medications:  acetaminophen 650 mg Oral Q6H PRN Diandra Vaca MD   aluminum-magnesium hydroxide-simethicone 30 mL Oral Q4H PRN Alivia Nevarez PA-C   apixaban 5 mg Oral BID Sandy Piger, SIRISHA   ARIPiprazole 15 mg Oral Daily Sandy Piger, SIRISHA   diphenhydrAMINE 25 mg Intravenous Q8H Kyung Cedillo PA-C   fluvoxaMINE 300 mg Oral HS Sandy Piger, SIRISHA   gabapentin 600 mg Oral TID Raiza Cabral PA-C   lamoTRIgine 200 mg Oral Daily Sandy Piger, SIRISHA   LORazepam 2 mg Oral BID Sandy Piger, SIRISHA   LORazepam 4 mg Oral HS Sandy Piger, SIRISHA   methylPREDNISolone sodium succinate 500 mg Intravenous QAM Antonio Barreto PA-C   metoclopramide 10 mg Intravenous Q8H [address] Kyung Dickens PA-C   mexiletine 150 mg Oral TID Sandy Piger, SIRISHA   ondansetron 4 mg Intravenous Q6H PRN Sandy PigerSIRISHA   valproate (DEPACON) IVPB 1,000 mg Intravenous Q24H [address] Kyung Dickens PA-C   zonisamide 200 mg Oral Daily Raiza Cabral PA-C        Today, Patient Was Seen By: MOON Neville    ** Please Note: Dictation voice to text software may have been used in the creation of this document   ** No

## 2024-05-07 ENCOUNTER — OFFICE VISIT (OUTPATIENT)
Dept: PHYSICAL THERAPY | Facility: CLINIC | Age: 64
End: 2024-05-07
Payer: COMMERCIAL

## 2024-05-07 DIAGNOSIS — Z96.611 S/P REVERSE TOTAL SHOULDER ARTHROPLASTY, RIGHT: Primary | ICD-10-CM

## 2024-05-07 DIAGNOSIS — R29.898 WEAKNESS OF BOTH LOWER EXTREMITIES: ICD-10-CM

## 2024-05-07 DIAGNOSIS — R26.2 AMBULATORY DYSFUNCTION: ICD-10-CM

## 2024-05-07 PROCEDURE — 97110 THERAPEUTIC EXERCISES: CPT

## 2024-05-07 PROCEDURE — 97112 NEUROMUSCULAR REEDUCATION: CPT

## 2024-05-07 NOTE — PROGRESS NOTES
"Daily Note     Today's date: 2024  Patient name: Li Torre  : 1960  MRN: [id number]  Referring provider: Derrick Vasquez MD  Dx:   Encounter Diagnosis     ICD-10-CM    1. S/P reverse total shoulder arthroplasty, right  Z96.611       2. Ambulatory dysfunction  R26.2       3. Weakness of both lower extremities  R29.898                      Subjective: Patient reports doing well today.       Objective: See treatment diary below      Assessment: Tolerated treatment well. Patient would benefit from continued PT      Plan: Continue per plan of care.      Precautions: S/p R reverse TSA on 10/24/23.  As per MD: biceps/triceps strengthening up to her weight restriction (25#).  Light deltoid and periscapular strength to tolerance.  No overhead weights. No RTC strengthening     Date 3/28 4/2 4/4 4/9 4/16 4/18 4/23 4/25 5/2 5/7   Visit # 13 14 15 16 17 18 19 20 21 22   FOTO         Done (d/c)    Re-eval      DK         Manuals 3/28 4/2 4/4 4/9  4/18 4/23 4/25 5/2 5/7                                                       Neuro Re-Ed     clamshells 2x10 GTB ea s/l 2x10 GTB ea s/l  2x10 GTB ea s/l 2x10 GTB ea s/l 2x10 BTB ea s/l  2x10 BTB s/l ea 2x10 BTB s/l ea 2x10 BTB s/l ea 2x10 BTB s/l ea   marching 2x10 GTB ea h/l 2x10 PTB stand, 2x10 GTB h/l  2x10 GTB stand ea 2x10 BTB h/l ea 2x10 BTB h/l ea  2x10 BTB h/l ea 2x10 BTB h/l ea 2x10 BTB h/l ea 2x10 BTB    bridges 2x10  5\" 2x10 5\" w GTB abd 2x10 5\" w GTB abd 2x10 5\" w BTB abd 2x10 5\" w BTB abde  2x10 5\" w BTB abd 2x10 5\" w BTB abd 2x10 5\" w BTB abd 2x10 5\" BTB abd   LAQ 2x10 2# ea 2x10 2.5# ea 2x10 2.5# ea 2x10 2.5# ea 2x10 2.5# ea SAQ  2x10 2.5# ea 2x10 3# ea 2x10 3# ea 2x10 3# ea   Scap punches                          Pt Edu      DK       Ther Ex 3/28 4/2 4/4 4/9 4/16 4/18 4/23 4/25 5/2 5/7   bike 6' 6' 6' 6' 6'  6' 6' 6' 7'   SLR 2x10 1# R, 2# L 2x10 2# R, 2.5# L 2x10 2# R, 2.5# L 2x10 2.5# ea 2x10 2.5# ea  2x10 2.5# " "ea 2x10 3# ea 2x10 3# ea 2x10 3# ea   S/l hip abd 2x10 1# R, 2# L 2x10 2# R, 2.5# L 2x10 2# R, 2.5# L 2x10 2.5# ea 2x10 2.5# ea  2x10 2.5# ea 2x10 3# ea 2x10 3# ea 2x10 3# ea   Standing hip abd/ext OTB 2x10 ea 2x10 PTB ea 2x10 GTB ea 2x10 BTB ea 2x10 BTB ea  2x10 BTB ea 2x10 BTB ea 2x10 BTB ea 2x10 BTB ea   sidestepping  4 laps PTB 4 laps GTB  4 laps BTB  4 laps BTB  5 laps BTB 4 laps BTB 4 laps BTB   Leg press 2x15  165#  BLE 2x15 185# BLE 2x15 185# BLE  2x15 185# BLE 2x15 185# BLE  2x15 205# BLE 2x15 205# BLE 2x15 205# BLE 2x15 205# BLE   Monster walks  4 laps PTB 4 laps GTB  4 laps BTB  4 laps BTB 5 laps BTB 4 laps BTB 4 laps BTB   Shoulder raises      2x10 2# flex, abd to 90       Rows/exts      20x GTB ea 2x15 BTB ea 2x15 black ea 2x15 black ea 2x15 black ea   Bicep curls       2x10 4# 2x10 5#  ea 2x10 5# ea 2x10 ea   Tricep exts       2x10 GTB 2x10 BTB 2x10 BTB  2x10 BTB   Wall slides abd        10x3\"  10x3\"  10x3\"   Finger ladder       5x5\" abd*                   Ther Activity 3/28 4/2 4/5 4/9 4/16 4/18 4/23 4/25 5/2 5/7   squats  2x10 2x10           Step ups 2x10 ea  1R 2x10 ea 1R 2x10 ea 1R 10x R up 2R, 20x ea 1R up np  10x ea 1R, 10x ea 2R       Gait Training   4/5 4/9         hurdles  nv 3 laps ea step to low hurdles 3 laps ea step to, 3 laps step over low hurdles nv                     Modalities                                                                            "

## 2024-05-09 ENCOUNTER — APPOINTMENT (OUTPATIENT)
Dept: PHYSICAL THERAPY | Facility: CLINIC | Age: 64
End: 2024-05-09
Payer: COMMERCIAL

## 2024-05-10 ENCOUNTER — APPOINTMENT (OUTPATIENT)
Dept: PHYSICAL THERAPY | Facility: CLINIC | Age: 64
End: 2024-05-10
Payer: COMMERCIAL

## 2024-05-14 ENCOUNTER — OFFICE VISIT (OUTPATIENT)
Dept: PHYSICAL THERAPY | Facility: CLINIC | Age: 64
End: 2024-05-14
Payer: COMMERCIAL

## 2024-05-14 DIAGNOSIS — R29.898 WEAKNESS OF BOTH LOWER EXTREMITIES: ICD-10-CM

## 2024-05-14 DIAGNOSIS — Z96.611 S/P REVERSE TOTAL SHOULDER ARTHROPLASTY, RIGHT: Primary | ICD-10-CM

## 2024-05-14 DIAGNOSIS — R26.2 AMBULATORY DYSFUNCTION: ICD-10-CM

## 2024-05-14 PROCEDURE — 97112 NEUROMUSCULAR REEDUCATION: CPT

## 2024-05-14 PROCEDURE — 97110 THERAPEUTIC EXERCISES: CPT

## 2024-05-14 NOTE — PROGRESS NOTES
Daily Note     Today's date: 2024  Patient name: Li Torre  : 1960  MRN: [id number]  Referring provider: Derrick Vasquez MD  Dx:   Encounter Diagnosis     ICD-10-CM    1. S/P reverse total shoulder arthroplasty, right  Z96.611       2. Ambulatory dysfunction  R26.2       3. Weakness of both lower extremities  R29.898           Start Time: 1000  Stop Time: 1045  Total time in clinic (min): 45 minutes    Subjective: Pt stated that she is doing well today but had a migraine last week and could not do too much at home. Pt stated that her R shoulder is feeling better with activities at home.       Objective: See treatment diary below      Assessment: Pt tolerated warm up on bike well at beginning of session without increase in discomfort during or after activity. Pt performed R shoulder activities well with mild improvement in R shoulder ROM noted during performance of wall slides with decrease discomfort compared to last visit. Pt continues to be challenged with SLR activities especially on R side with mild fatigue and soreness post performance. Pt was challenged appropriately with progression of resistance with BLE strengthening activities with theraband and was given increased resistance band for home use. Pt would benefit from continued skilled PT to improve BLE strength, mobility, gait, balance, R shoulder mobility, scapular stability, and functional ability.       Plan: Continue per plan of care.  Progress treatment as tolerated.       Precautions: S/p R reverse TSA on 10/24/23.  As per MD: biceps/triceps strengthening up to her weight restriction (25#).  Light deltoid and periscapular strength to tolerance.  No overhead weights. No RTC strengthening     Date    Visit # 23     18 19 20 21 22   FOTO         Done (d/c)    Re-eval      DK         Manuals                                                        Neuro Re-Ed   "4/23 4/25 5/2 5/7   clamshells 2x10 purp TB ea      2x10 BTB s/l ea 2x10 BTB s/l ea 2x10 BTB s/l ea 2x10 BTB s/l ea   marching 2x10 purp h/l ea      2x10 BTB h/l ea 2x10 BTB h/l ea 2x10 BTB h/l ea 2x10 BTB    bridges 2x10 5\" w purp abd      2x10 5\" w BTB abd 2x10 5\" w BTB abd 2x10 5\" w BTB abd 2x10 5\" BTB abd   LAQ 2x10 3# ea      2x10 2.5# ea 2x10 3# ea 2x10 3# ea 2x10 3# ea   Scap punches                          Pt Edu      DK       Ther Ex 5/14 4/18 4/23 4/25 5/2 5/7   bike 6'      6' 6' 6' 7'   SLR 2x10 3# ea      2x10 2.5# ea 2x10 3# ea 2x10 3# ea 2x10 3# ea   S/l hip abd 2x10 3# ea      2x10 2.5# ea 2x10 3# ea 2x10 3# ea 2x10 3# ea   Standing hip abd/ext 2x10 purp ea       2x10 BTB ea 2x10 BTB ea 2x10 BTB ea 2x10 BTB ea   sidestepping 3 laps purp      4 laps BTB  5 laps BTB 4 laps BTB 4 laps BTB   Leg press 2x15 205# BLE      2x15 205# BLE 2x15 205# BLE 2x15 205# BLE 2x15 205# BLE   Monster walks 3 laps purp      4 laps BTB 5 laps BTB 4 laps BTB 4 laps BTB   Shoulder raises      2x10 2# flex, abd to 90       Rows/exts 2x15 black ea     20x GTB ea 2x15 BTB ea 2x15 black ea 2x15 black ea 2x15 black ea   Bicep curls 2x10 5# ea      2x10 4# 2x10 5#  ea 2x10 5# ea 2x10 ea   Tricep exts 2x10 BTB      2x10 GTB 2x10 BTB 2x10 BTB  2x10 BTB   Wall slides abd 10x5\" flex, abd       10x3\"  10x3\"  10x3\"   Finger ladder       5x5\" abd*                   Ther Activity      4/18 4/23 4/25 5/2 5/7   squats             Step ups       10x ea 1R, 10x ea 2R       Gait Training             hurdles                          Modalities                                                                              "

## 2024-05-16 ENCOUNTER — OFFICE VISIT (OUTPATIENT)
Dept: PHYSICAL THERAPY | Facility: CLINIC | Age: 64
End: 2024-05-16
Payer: COMMERCIAL

## 2024-05-16 DIAGNOSIS — R29.898 WEAKNESS OF BOTH LOWER EXTREMITIES: ICD-10-CM

## 2024-05-16 DIAGNOSIS — R26.2 AMBULATORY DYSFUNCTION: ICD-10-CM

## 2024-05-16 DIAGNOSIS — Z96.611 S/P REVERSE TOTAL SHOULDER ARTHROPLASTY, RIGHT: Primary | ICD-10-CM

## 2024-05-16 PROCEDURE — 97112 NEUROMUSCULAR REEDUCATION: CPT

## 2024-05-16 PROCEDURE — 97110 THERAPEUTIC EXERCISES: CPT

## 2024-05-16 NOTE — PROGRESS NOTES
Daily Note     Today's date: 2024  Patient name: Li Torre  : 1960  MRN: [id number]  Referring provider: Derrick Vasquez MD  Dx:   Encounter Diagnosis     ICD-10-CM    1. S/P reverse total shoulder arthroplasty, right  Z96.611       2. Ambulatory dysfunction  R26.2       3. Weakness of both lower extremities  R29.898           Start Time: 0950  Stop Time: 1035  Total time in clinic (min): 45 minutes    Subjective: Pt stated that she could not sleep last night due to insomnia but did not have any discomfort and has been doing well functionally. Pt stated that she has been able to lift her leg up easier to get into the car lately.       Objective: See treatment diary below      Assessment: Pt tolerated warm up on bike well at beginning of session without increase in discomfort during or after activity. Pt had slight increase in fatigue with performance of standing BLE strengthening activities. Pt was re educated on proper form to avoid compensations with sidestepping and hip abduction activities due to observed trunk lean with activities, pt responded well to instruction. Pt demonstrated difficulty with sidelying hip abduction especially on the R side with fatigue and soreness post performance. Pt performed R shoulder mobility and scapular stability activities well with no increase in discomfort post performance. Pt would benefit from continued skilled PT to improve BLE strength, mobility, flexibility, gait, balance, and functional ability.     Plan: Continue per plan of care.  Progress treatment as tolerated.       Precautions: S/p R reverse TSA on 10/24/23.  As per MD: biceps/triceps strengthening up to her weight restriction (25#).  Light deltoid and periscapular strength to tolerance.  No overhead weights. No RTC strengthening     Date    Visit # 23 24    18 19 20 21 22   FOTO         Done (d/c)    Re-eval      DK         Manuals   "5/2 5/7                                                       Neuro Re-Ed 5/14 5/16 4/18 4/23 4/25 5/2 5/7   clamshells 2x10 purp TB ea 2x10 purp TB ea     2x10 BTB s/l ea 2x10 BTB s/l ea 2x10 BTB s/l ea 2x10 BTB s/l ea   marching 2x10 purp h/l ea 2x10 purp h/l ea     2x10 BTB h/l ea 2x10 BTB h/l ea 2x10 BTB h/l ea 2x10 BTB    bridges 2x10 5\" w purp abd 2x10 5\" w purp abd     2x10 5\" w BTB abd 2x10 5\" w BTB abd 2x10 5\" w BTB abd 2x10 5\" BTB abd   LAQ 2x10 3# ea 2x10 3# ea     2x10 2.5# ea 2x10 3# ea 2x10 3# ea 2x10 3# ea   Scap punches                          Pt Edu      DK       Ther Ex 5/14 5/16 4/18 4/23 4/25 5/2 5/7   bike 6' 6'     6' 6' 6' 7'   SLR 2x10 3# ea 2x10 3#     2x10 2.5# ea 2x10 3# ea 2x10 3# ea 2x10 3# ea   S/l hip abd 2x10 3# ea 2x10 3# ea     2x10 2.5# ea 2x10 3# ea 2x10 3# ea 2x10 3# ea   Standing hip abd/ext 2x10 purp ea  2x10 purp ea     2x10 BTB ea 2x10 BTB ea 2x10 BTB ea 2x10 BTB ea   sidestepping 3 laps purp 3 laps purp     4 laps BTB  5 laps BTB 4 laps BTB 4 laps BTB   Leg press 2x15 205# BLE 2x15 205# BLE      2x15 205# BLE 2x15 205# BLE 2x15 205# BLE 2x15 205# BLE   Monster walks 3 laps purp 3 laps purp     4 laps BTB 5 laps BTB 4 laps BTB 4 laps BTB   Shoulder raises      2x10 2# flex, abd to 90       Rows/exts 2x15 black ea 2x15 black ea    20x GTB ea 2x15 BTB ea 2x15 black ea 2x15 black ea 2x15 black ea   Bicep curls 2x10 5# ea 2x10 5# ea     2x10 4# 2x10 5#  ea 2x10 5# ea 2x10 ea   Tricep exts 2x10 BTB 2x10 BTB     2x10 GTB 2x10 BTB 2x10 BTB  2x10 BTB   Wall slides abd 10x5\" flex, abd 10x5\" flex, abd      10x3\"  10x3\"  10x3\"   Finger ladder       5x5\" abd*                   Ther Activity      4/18 4/23 4/25 5/2 5/7   squats             Step ups       10x ea 1R, 10x ea 2R       Gait Training             hurdles                          Modalities                                                                                "

## 2024-05-17 ENCOUNTER — OFFICE VISIT (OUTPATIENT)
Dept: FAMILY MEDICINE CLINIC | Facility: CLINIC | Age: 64
End: 2024-05-17
Payer: COMMERCIAL

## 2024-05-17 VITALS
TEMPERATURE: 97.6 F | BODY MASS INDEX: 36.37 KG/M2 | SYSTOLIC BLOOD PRESSURE: 130 MMHG | OXYGEN SATURATION: 98 % | HEART RATE: 81 BPM | DIASTOLIC BLOOD PRESSURE: 90 MMHG | HEIGHT: 68 IN | WEIGHT: 240 LBS

## 2024-05-17 DIAGNOSIS — Z12.31 ENCOUNTER FOR SCREENING MAMMOGRAM FOR MALIGNANT NEOPLASM OF BREAST: ICD-10-CM

## 2024-05-17 DIAGNOSIS — I26.99 PE (PULMONARY THROMBOEMBOLISM) (HCC): ICD-10-CM

## 2024-05-17 DIAGNOSIS — Z00.00 MEDICARE WELCOME EXAM: ICD-10-CM

## 2024-05-17 DIAGNOSIS — F31.9 BIPOLAR DEPRESSION (HCC): ICD-10-CM

## 2024-05-17 DIAGNOSIS — E66.01 SEVERE OBESITY (BMI 35.0-39.9) WITH COMORBIDITY (HCC): Primary | ICD-10-CM

## 2024-05-17 PROCEDURE — G0402 INITIAL PREVENTIVE EXAM: HCPCS | Performed by: FAMILY MEDICINE

## 2024-05-17 PROCEDURE — 99214 OFFICE O/P EST MOD 30 MIN: CPT | Performed by: FAMILY MEDICINE

## 2024-05-17 RX ORDER — TOPIRAMATE 25 MG/1
25 TABLET ORAL 2 TIMES DAILY
Qty: 60 TABLET | Refills: 2 | Status: SHIPPED | OUTPATIENT
Start: 2024-05-17

## 2024-05-17 RX ORDER — LORAZEPAM 0.5 MG/1
0.5 TABLET ORAL DAILY PRN
Qty: 15 TABLET | Refills: 0 | Status: SHIPPED | OUTPATIENT
Start: 2024-05-17

## 2024-05-17 NOTE — PROGRESS NOTES
Ambulatory Visit  Name: Li Torre      : 1960      MRN: [id number]  Encounter Provider: Derrick Vasquez MD  Encounter Date: 2024   Encounter department: [facility]    Assessment & Plan   1. Severe obesity (BMI 35.0-39.9) with comorbidity (HCC)  -     topiramate (Topamax) 25 mg tablet; Take 1 tablet (25 mg total) by mouth 2 (two) times a day  2. Encounter for screening mammogram for malignant neoplasm of breast  -     Mammo screening bilateral w 3d & cad; Future  3. Bipolar depression (HCC)  -     LORazepam (ATIVAN) 0.5 mg tablet; Take 1 tablet (0.5 mg total) by mouth daily as needed for anxiety  4. PE (pulmonary thromboembolism) (HCC)  5. Medicare welcome exam    64 y/o woman with: severe obesity with comorbidity, bipolar d/o, h/o PE on chronic anticoagulation along with Welcome to Medicare visit. Will continue meds. PAPDMP reviewed and refilled. Discussed supportive care and return parameters.     Regarding Welcome to Medicare Annual well visit. Discussed various safety and health maintenance issues including healthy diet like the Mediterranean diet, exercise, ample sleep, stress reduction, and healthy weight as tolerated. Discussed supportive care and return parameters.        Preventive health issues were discussed with patient, and age appropriate screening tests were ordered as noted in patient's After Visit Summary. Personalized health advice and appropriate referrals for health education or preventive services given if needed, as noted in patient's After Visit Summary.    History of Present Illness     Patient is a 64 y/o woman who presents for follow-up on severe obesity with comorbidity, bipolar d/o, h/o PE on chronic anticoagulation. Pt admits being stable on meds. And denies acute complaints no fevers chills nausea or vomiting. Pt also here for Welcome to Medicare exam admits being active, eats and sleeps well.       Patient Care Team:  Derrick  MD Christina as PCP - General (Family Medicine)  Kristi Kelley as RN Care Manager (Care Coordination)  Kaylin Olivas RN as RN Care Manager (Care Coordination)    Review of Systems   Constitutional: Negative.    HENT: Negative.     Eyes: Negative.    Respiratory: Negative.     Cardiovascular: Negative.    Gastrointestinal: Negative.    Endocrine: Negative.    Genitourinary: Negative.    Musculoskeletal: Negative.    Allergic/Immunologic: Negative.    Neurological: Negative.    Hematological: Negative.    Psychiatric/Behavioral: Negative.     All other systems reviewed and are negative.    Medical History Reviewed by provider this encounter:       Annual Wellness Visit Questionnaire   Li is here for her Welcome to Medicare visit. Last Medicare Wellness visit information reviewed, patient interviewed and updates made to the record today.      Health Risk Assessment:   Patient rates overall health as good. Patient feels that their physical health rating is slightly better. Patient is very dissatisfied with their life. Eyesight was rated as same. Hearing was rated as same. Patient feels that their emotional and mental health rating is much better. Patients states they are never, rarely angry. Patient states they are never, rarely unusually tired/fatigued. Pain experienced in the last 7 days has been none. Patient states that she has experienced weight loss or gain in last 6 months.     Depression Screening:   PHQ-9 Score: 1      Fall Risk Screening:   In the past year, patient has experienced: no history of falling in past year      Urinary Incontinence Screening:   Patient has not leaked urine accidently in the last six months.     Home Safety:  Patient does not have trouble with stairs inside or outside of their home. Patient has working smoke alarms and has working carbon monoxide detector. Home safety hazards include: none.     Nutrition:   Current diet is Regular.     Medications:   Patient is currently  taking over-the-counter supplements. OTC medications include: see medication list. Patient is able to manage medications.     Activities of Daily Living (ADLs)/Instrumental Activities of Daily Living (IADLs):   Walk and transfer into and out of bed and chair?: Yes  Dress and groom yourself?: Yes    Bathe or shower yourself?: Yes    Feed yourself? Yes  Do your laundry/housekeeping?: Yes  Manage your money, pay your bills and track your expenses?: Yes  Make your own meals?: Yes    Do your own shopping?: Yes    Previous Hospitalizations:   Any hospitalizations or ED visits within the last 12 months?: No      Advance Care Planning:   Living will: No      Cognitive Screening:   Provider or family/friend/caregiver concerned regarding cognition?: No    PREVENTIVE SCREENINGS      Cardiovascular Screening:    General: Screening Not Indicated and History Lipid Disorder      Diabetes Screening:     General: Screening Current      Colorectal Cancer Screening:     General: Screening Current      Breast Cancer Screening:     General: Screening Current      Cervical Cancer Screening:    General: Screening Not Indicated      Osteoporosis Screening:    General: Patient Declines      Abdominal Aortic Aneurysm (AAA) Screening:        General: Screening Not Indicated      Lung Cancer Screening:     General: Screening Not Indicated      Hepatitis C Screening:    General: Screening Not Indicated    Screening, Brief Intervention, and Referral to Treatment (SBIRT)    Screening      Single Item Drug Screening:  How often have you used an illegal drug (including marijuana) or a prescription medication for non-medical reasons in the past year? never    Single Item Drug Screen Score: 0  Interpretation: Negative screen for possible drug use disorder    Social Determinants of Health     Food Insecurity: No Food Insecurity (5/17/2024)    Hunger Vital Sign     Worried About Running Out of Food in the Last Year: Never true     Ran Out of Food in  "the Last Year: Never true   Transportation Needs: No Transportation Needs (5/17/2024)    PRAPARE - Transportation     Lack of Transportation (Medical): No     Lack of Transportation (Non-Medical): No   Housing Stability: Unknown (5/17/2024)    Housing Stability Vital Sign     Unable to Pay for Housing in the Last Year: No     Number of Times Moved in the Last Year: 1   Utilities: Not At Risk (5/17/2024)    [facility] Utilities     Threatened with loss of utilities: No     No results found.    Objective     /90 (BP Location: Right arm, Patient Position: Sitting, Cuff Size: Large)   Pulse 81   Temp 97.6 °F (36.4 °C) (Temporal)   Ht 5' 8\" (1.727 m)   Wt 109 kg (240 lb)   SpO2 98%   BMI 36.49 kg/m²     Physical Exam  Constitutional:       General: She is not in acute distress.     Appearance: She is well-developed. She is not diaphoretic.   HENT:      Head: Normocephalic and atraumatic.      Right Ear: External ear normal.      Left Ear: External ear normal.      Nose: Nose normal.      Mouth/Throat:      Mouth: Oropharynx is clear and moist.      Pharynx: No oropharyngeal exudate.   Eyes:      General:         Right eye: No discharge.         Left eye: No discharge.      Extraocular Movements: EOM normal.      Conjunctiva/sclera: Conjunctivae normal.      Pupils: Pupils are equal, round, and reactive to light.   Neck:      Thyroid: No thyromegaly.      Trachea: No tracheal deviation.   Cardiovascular:      Rate and Rhythm: Normal rate and regular rhythm.      Heart sounds: Normal heart sounds. No murmur heard.     No friction rub. No gallop.   Pulmonary:      Effort: Pulmonary effort is normal. No respiratory distress.      Breath sounds: Normal breath sounds.   Abdominal:      General: There is no distension.      Palpations: Abdomen is soft.      Tenderness: There is no abdominal tenderness. There is no guarding or rebound.   Musculoskeletal:         General: Normal range of motion.   Lymphadenopathy:      " Cervical: No cervical adenopathy.   Skin:     General: Skin is warm.   Neurological:      Mental Status: She is alert and oriented to person, place, and time.      Cranial Nerves: No cranial nerve deficit.   Psychiatric:         Mood and Affect: Mood and affect normal.         Behavior: Behavior normal.         Thought Content: Thought content normal.         Judgment: Judgment normal.       Administrative Statements

## 2024-05-20 ENCOUNTER — TELEPHONE (OUTPATIENT)
Dept: HEMATOLOGY ONCOLOGY | Facility: CLINIC | Age: 64
End: 2024-05-20

## 2024-05-20 ENCOUNTER — DOCUMENTATION (OUTPATIENT)
Dept: CASE MANAGEMENT | Facility: OTHER | Age: 64
End: 2024-05-20

## 2024-05-20 NOTE — MEDICAL HIGH UTILIZER
High Utilizer Care Plan for: Li Torre  Updated: 2024  Patient Name: Li Torre          MRN: [id number]       : 1960 Age: 63      Sex:  F   Utilization Background: She is a female with a history of migraine, Andrés's disease, Bipolar, Depression, Fibromyalgia, and HTN who presents to [facility] [place] and [facility] with migraine. She has 14 ER visits, 9 admission, and 2 transfers to [facility] for Ketamine Infusions in 2019. Discussed with Neurology reveals that patient does not feel much better even after prolonged hospitalization.       In the last 90 days: 0 encounters   Treatment Recommendations:    ED Recommendations: Recommendations as per neurology: Give migraine protocol: using steroid protocol d/t toradol allergy. Recommend calling her [place] [facility]ist to schedule close outpatient follow up. It is noted that the patient will provide other complaints to the ER providers to get admitted and then will complain of migraine in the hospital.     Would not offer transfer for Ketamine Infusion to this patient as it has not worked in the past. This should be left up to Neurology to determine if this is appropriate.       Inpatient Recommendations: Early consultation with neurology. Avoid narcotics/sedating agents due to over sedation in the past       Outpatient recommendations: Needs close follow up with [facility]. Needs CM to make sure that patient does not run out of her meds as she has in the past.      Situation: Patient who presents frequently for migraines. It seems that she has started using other chief complaints to get admitted to the hospital for migraine treatment. As per neurology colleague her headache symptomatology does not usually change with treatment      PMH/PSH:  Migraine, Depression, Bipolar, Fibromyalgia, HTN, Hx of DVT      Assessment                              Drivers of repeated utilization:                 Symptomatology     Community Resources in  place:    None - she has mentioned that she does not have a  for infusions  May need assistance with transportation and with medications   Patient Care Team:   Derrick Vasquez MD - PCP ([facility])  MOON Calle - Psych  MOON Hernandez/Luigi Jones MD - Neurology ([facility])  Altagracia Jade MD - Hematology ([facility])  OP Care Manager: Kaylin Olivas RN              Care plan date and owner: Terry Duckworth Jr., PA-C 10/31/2019         Reviewed with patient before discharge?

## 2024-05-20 NOTE — TELEPHONE ENCOUNTER
Receive zeinab: Hi, this is Li Tierney. My YOB: 1960. The reason I'm calling is you had received a fax on Friday from [facility] in reference to my holding my Eliquis for two days. You just have to verify that that's correct. And I have held my Eliquis for two days and my surgery is scheduled for tomorrow. So I just wanted to make sure that the paperwork was sent over to [facility] as soon as possible so that I can have my surgery tomorrow because I'm having a lot of pain in my teeth. So if you need to call me back, my number is [phone]. Thank you.

## 2024-05-20 NOTE — TELEPHONE ENCOUNTER
Patient Call    Who are you speaking with? Patient    If it is not the patient, are they listed on an active communication consent form? N/A   What is the reason for this call? Fax received in right fax from [facility] for clearance for procedure tomorrow. She asked for it to be faxed back asap   Does this require a call back? Yes   If a call back is required, please list best call back number [phone]    If a call back is required, advise that a message will be forwarded to their care team and someone will return their call as soon as possible.   Did you relay this information to the patient? Yes

## 2024-05-20 NOTE — TELEPHONE ENCOUNTER
Patient Call    Who are you speaking with? Patient    If it is not the patient, are they listed on an active communication consent form? N/A   What is the reason for this call? Patient is calling because she is faxing paperwork into the office for her dental procedure on 5/21/24.   Does this require a call back? No   If a call back is required, please list best call back number na   If a call back is required, advise that a message will be forwarded to their care team and someone will return their call as soon as possible.   Did you relay this information to the patient? Yes

## 2024-05-20 NOTE — TELEPHONE ENCOUNTER
Patient called to see if we got a fax from [facility] for a medication hold for a procedure  tomorrow. I advised I do not see any fax from them, and I gave her the fax number and she will call them back to re send it.

## 2024-05-20 NOTE — TELEPHONE ENCOUNTER
Called patient to advise that we have not received a form from [facility]. No answer from patient, LVM informing patient that we are not in receipt of the form and I do not know which office she is going to tomorrow.    Called [facility] in [facility], no answer all prompts lead to the call being disconnected.    Called [facility] in [place], no answer all prompts lead to the call being disconnected.

## 2024-05-21 ENCOUNTER — APPOINTMENT (OUTPATIENT)
Dept: PHYSICAL THERAPY | Facility: CLINIC | Age: 64
End: 2024-05-21
Payer: COMMERCIAL

## 2024-05-22 PROBLEM — Z00.00 MEDICARE WELCOME EXAM: Status: ACTIVE | Noted: 2024-05-22

## 2024-05-22 PROBLEM — F13.20 BENZODIAZEPINE DEPENDENCE (HCC): Status: RESOLVED | Noted: 2023-06-09 | Resolved: 2024-05-22

## 2024-05-23 ENCOUNTER — APPOINTMENT (OUTPATIENT)
Dept: PHYSICAL THERAPY | Facility: CLINIC | Age: 64
End: 2024-05-23
Payer: COMMERCIAL

## 2024-05-28 ENCOUNTER — OFFICE VISIT (OUTPATIENT)
Dept: PHYSICAL THERAPY | Facility: CLINIC | Age: 64
End: 2024-05-28
Payer: COMMERCIAL

## 2024-05-28 DIAGNOSIS — R26.2 AMBULATORY DYSFUNCTION: ICD-10-CM

## 2024-05-28 DIAGNOSIS — Z96.611 S/P REVERSE TOTAL SHOULDER ARTHROPLASTY, RIGHT: Primary | ICD-10-CM

## 2024-05-28 DIAGNOSIS — R29.898 WEAKNESS OF BOTH LOWER EXTREMITIES: ICD-10-CM

## 2024-05-28 PROCEDURE — 97112 NEUROMUSCULAR REEDUCATION: CPT

## 2024-05-28 PROCEDURE — 97110 THERAPEUTIC EXERCISES: CPT

## 2024-05-28 NOTE — PROGRESS NOTES
Daily Note     Today's date: 2024  Patient name: Li Torre  : 1960  MRN: [id number]  Referring provider: Derrick Vasquez MD  Dx:   Encounter Diagnosis     ICD-10-CM    1. S/P reverse total shoulder arthroplasty, right  Z96.611       2. Ambulatory dysfunction  R26.2       3. Weakness of both lower extremities  R29.898           Start Time: 09  Stop Time: 1040  Total time in clinic (min): 45 minutes    Subjective: Pt stated that she is doing ok overall but has been having some issues with her mouth and jaw after having a procedure for her teeth last week. Pt also stated that even with performing the exercises for her shoulder it feels like they are still weak.       Objective: See treatment diary below      Assessment: Pt tolerated warm up on bike well at beginning of session without increase in discomfort during or after activity. Discussed with pt on proper R shoulder activities again during session, educated on reason why RTC strengthening activities should not be performed and to avoid overhead weights still, pt verbally acknowledged education. Pt performed BLE strengthening activities well with mild fatigue post performance but no increase in discomfort. Pt could not tolerate performance of s/l abduction with ankle weights today, modified to perform with weights which pt tolerated better. Trial UBE next visit. Pt would benefit from continued skilled PT to improve BLE strength, mobility, gait, balance, RUE ability, and functional ability.       Plan: Continue per plan of care.  Progress treatment as tolerated.       Precautions: S/p R reverse TSA on 10/24/23.  As per MD: biceps/triceps strengthening up to her weight restriction (25#).  Light deltoid and periscapular strength to tolerance.  No overhead weights. No RTC strengthening     Date    Visit # 23 24 25   18 19 20 21 22   FOTO         Done (d/c)    Re-eval      DK         Manuals     "4/18 4/23 4/25 5/2 5/7                                                       Neuro Re-Ed 5/14 5/16 5/28 4/18 4/23 4/25 5/2 5/7   clamshells 2x10 purp TB ea 2x10 purp TB ea 2x10 purp TB ea    2x10 BTB s/l ea 2x10 BTB s/l ea 2x10 BTB s/l ea 2x10 BTB s/l ea   marching 2x10 purp h/l ea 2x10 purp h/l ea 2x10 purp h/l ea    2x10 BTB h/l ea 2x10 BTB h/l ea 2x10 BTB h/l ea 2x10 BTB    bridges 2x10 5\" w purp abd 2x10 5\" w purp abd 2x10 5\" w purp abd    2x10 5\" w BTB abd 2x10 5\" w BTB abd 2x10 5\" w BTB abd 2x10 5\" BTB abd   LAQ 2x10 3# ea 2x10 3# ea 2x10 5\" 3# ea    2x10 2.5# ea 2x10 3# ea 2x10 3# ea 2x10 3# ea   Scap punches                          Pt Edu      DK       Ther Ex 5/14 5/16 5/28 4/18 4/23 4/25 5/2 5/7   bike 6' 6' 6'    6' 6' 6' 7'   UBE   nv          SLR 2x10 3# ea 2x10 3# 2x10 3# ea    2x10 2.5# ea 2x10 3# ea 2x10 3# ea 2x10 3# ea   S/l hip abd 2x10 3# ea 2x10 3# ea 2x10 0# ea    2x10 2.5# ea 2x10 3# ea 2x10 3# ea 2x10 3# ea   Standing hip abd/ext 2x10 purp ea  2x10 purp ea 2x10 purple ea    2x10 BTB ea 2x10 BTB ea 2x10 BTB ea 2x10 BTB ea   sidestepping 3 laps purp 3 laps purp 3 laps purp    4 laps BTB  5 laps BTB 4 laps BTB 4 laps BTB   Leg press 2x15 205# BLE 2x15 205# BLE  2x15 205# BLE    2x15 205# BLE 2x15 205# BLE 2x15 205# BLE 2x15 205# BLE   Monster walks 3 laps purp 3 laps purp     4 laps BTB 5 laps BTB 4 laps BTB 4 laps BTB   Shoulder raises   2x10 2# flex, abd to 90   2x10 2# flex, abd to 90       Rows/exts 2x15 black ea 2x15 black ea 2x15 black ea   20x GTB ea 2x15 BTB ea 2x15 black ea 2x15 black ea 2x15 black ea   Bicep curls 2x10 5# ea 2x10 5# ea     2x10 4# 2x10 5#  ea 2x10 5# ea 2x10 ea   Tricep exts 2x10 BTB 2x10 BTB     2x10 GTB 2x10 BTB 2x10 BTB  2x10 BTB   Wall slides abd 10x5\" flex, abd 10x5\" flex, abd      10x3\"  10x3\"  10x3\"   Finger ladder       5x5\" abd*                   Ther Activity      4/18 4/23 4/25 5/2 5/7   squats             Step ups       10x ea 1R, 10x ea 2R       Gait " Training             hurdles                          Modalities

## 2024-05-30 ENCOUNTER — OFFICE VISIT (OUTPATIENT)
Dept: PHYSICAL THERAPY | Facility: CLINIC | Age: 64
End: 2024-05-30
Payer: COMMERCIAL

## 2024-05-30 DIAGNOSIS — R29.898 WEAKNESS OF BOTH LOWER EXTREMITIES: ICD-10-CM

## 2024-05-30 DIAGNOSIS — R26.2 AMBULATORY DYSFUNCTION: ICD-10-CM

## 2024-05-30 DIAGNOSIS — Z96.611 S/P REVERSE TOTAL SHOULDER ARTHROPLASTY, RIGHT: Primary | ICD-10-CM

## 2024-05-30 PROCEDURE — 97112 NEUROMUSCULAR REEDUCATION: CPT

## 2024-05-30 PROCEDURE — 97110 THERAPEUTIC EXERCISES: CPT

## 2024-05-30 NOTE — PROGRESS NOTES
Daily Note     Today's date: 2024  Patient name: Li Torre  : 1960  MRN: [id number]  Referring provider: Derrick Vasquez MD  Dx:   Encounter Diagnosis     ICD-10-CM    1. S/P reverse total shoulder arthroplasty, right  Z96.611       2. Ambulatory dysfunction  R26.2       3. Weakness of both lower extremities  R29.898           Start Time: 09  Stop Time: 1038  Total time in clinic (min): 43 minutes    Subjective: Pt stated that she is doing better physically today compared to last visit but is a little under the weather.       Objective: See treatment diary below      Assessment: Pt tolerated warm up on bike and UBE without pain well at beginning of session without increase in discomfort during or after activity. Pt showed slight difficulty with standing BLE strengthening activities with purple TB with mild fatigue post performance. Pt tolerated performance of BUE mobility and scapular stability activities well without increase in pain during activity. Pt continues to show difficulty with BLE abduction activities and fatigues quickly during performance. Pt would benefit from continued skilled PT to improve BLE strength, balance, gait, BUE mobility, scapular stability, and functional ability.    Plan: Continue per plan of care.  Progress treatment as tolerated.       Precautions: S/p R reverse TSA on 10/24/23.  As per MD: biceps/triceps strengthening up to her weight restriction (25#).  Light deltoid and periscapular strength to tolerance.  No overhead weights. No RTC strengthening     Date    Visit # 23 24 25 26  18 19 20 21 22   FOTO         Done (d/c)    Re-eval      DK         Manuals                                                        Neuro Re-Ed    clamshells 2x10 purp TB ea 2x10 purp TB ea 2x10 purp TB ea 2x10 purp TB ea   2x10 BTB s/l ea 2x10 BTB s/l ea  "2x10 BTB s/l ea 2x10 BTB s/l ea   marching 2x10 purp h/l ea 2x10 purp h/l ea 2x10 purp h/l ea 2x10 purp stand ea   2x10 BTB h/l ea 2x10 BTB h/l ea 2x10 BTB h/l ea 2x10 BTB    bridges 2x10 5\" w purp abd 2x10 5\" w purp abd 2x10 5\" w purp abd 2x10 5\" w purp abd   2x10 5\" w BTB abd 2x10 5\" w BTB abd 2x10 5\" w BTB abd 2x10 5\" BTB abd   LAQ 2x10 3# ea 2x10 3# ea 2x10 5\" 3# ea 2x10 5\" 3# ea   2x10 2.5# ea 2x10 3# ea 2x10 3# ea 2x10 3# ea   Scap punches                          Pt Edu      DK       Ther Ex 5/14 5/16 5/28 5/30 4/18 4/23 4/25 5/2 5/7   bike 6' 6' 6' 6'   6' 6' 6' 7'   UBE   nv 3'/3'         SLR 2x10 3# ea 2x10 3# 2x10 3# ea    2x10 2.5# ea 2x10 3# ea 2x10 3# ea 2x10 3# ea   S/l hip abd 2x10 3# ea 2x10 3# ea 2x10 0# ea 2x10 0# ea   2x10 2.5# ea 2x10 3# ea 2x10 3# ea 2x10 3# ea   Standing hip abd/ext 2x10 purp ea  2x10 purp ea 2x10 purple ea 2x10 purple ea   2x10 BTB ea 2x10 BTB ea 2x10 BTB ea 2x10 BTB ea   sidestepping 3 laps purp 3 laps purp 3 laps purp    4 laps BTB  5 laps BTB 4 laps BTB 4 laps BTB   Leg press 2x15 205# BLE 2x15 205# BLE  2x15 205# BLE 2x15 205# BLE   2x15 205# BLE 2x15 205# BLE 2x15 205# BLE 2x15 205# BLE   Monster walks 3 laps purp 3 laps purp     4 laps BTB 5 laps BTB 4 laps BTB 4 laps BTB   Shoulder raises   2x10 2# flex, abd to 90 2x10 2# flex, abd to 90  2x10 2# flex, abd to 90       Rows/exts 2x15 black ea 2x15 black ea 2x15 black ea 2x15 10# ea  20x GTB ea 2x15 BTB ea 2x15 black ea 2x15 black ea 2x15 black ea   Bicep curls 2x10 5# ea 2x10 5# ea     2x10 4# 2x10 5#  ea 2x10 5# ea 2x10 ea   Tricep exts 2x10 BTB 2x10 BTB     2x10 GTB 2x10 BTB 2x10 BTB  2x10 BTB   Wall slides abd 10x5\" flex, abd 10x5\" flex, abd      10x3\"  10x3\"  10x3\"   Finger ladder       5x5\" abd*                   Ther Activity      4/18 4/23 4/25 5/2 5/7   squats             Step ups       10x ea 1R, 10x ea 2R       Gait Training             hurdles                          Modalities                               "

## 2024-06-04 ENCOUNTER — APPOINTMENT (OUTPATIENT)
Dept: PHYSICAL THERAPY | Facility: CLINIC | Age: 64
End: 2024-06-04
Payer: COMMERCIAL

## 2024-06-06 ENCOUNTER — EVALUATION (OUTPATIENT)
Dept: PHYSICAL THERAPY | Facility: CLINIC | Age: 64
End: 2024-06-06
Payer: COMMERCIAL

## 2024-06-06 DIAGNOSIS — R26.2 AMBULATORY DYSFUNCTION: ICD-10-CM

## 2024-06-06 DIAGNOSIS — Z96.611 S/P REVERSE TOTAL SHOULDER ARTHROPLASTY, RIGHT: Primary | ICD-10-CM

## 2024-06-06 DIAGNOSIS — R29.898 WEAKNESS OF BOTH LOWER EXTREMITIES: ICD-10-CM

## 2024-06-06 PROCEDURE — 97164 PT RE-EVAL EST PLAN CARE: CPT

## 2024-06-06 PROCEDURE — 97110 THERAPEUTIC EXERCISES: CPT

## 2024-06-06 NOTE — PROGRESS NOTES
Re-evaluation     Today's date: 2024  Patient name: Li Torre  : 1960  MRN: [id number]  Referring provider: Derrick Vasquez MD  Dx:   Encounter Diagnosis     ICD-10-CM    1. S/P reverse total shoulder arthroplasty, right  Z96.611       2. Ambulatory dysfunction  R26.2       3. Weakness of both lower extremities  R29.898           Start Time: 1530  Stop Time: 1615  Total time in clinic (min): 45 minutes    Subjective: Pt stated that for the most part her legs are feeling very good, but does have some days where they feel weak and fatigue easily still. Pt stated that she does not have any pain in her legs, back, or shoulders. Pt stated that she does feel like that strength in her arms is still a problem for her with doing things throughout her day such as lifting a [employer] cup up onto a shelf, she had more difficulty with her LUE compared to her RUE, but otherwise is doing well ROM wise with both sides.       Objective: See treatment diary below    Active Range of Motion   Left Shoulder   Flexion: 175 degrees   Abduction: 125 degrees   ER: 80 degrees  IR: 70 degrees     Right Shoulder   Flexion: 170 degrees   Abduction: 160 degrees   External rotation 45°: 80 degrees   Internal rotation 45: 45 degrees      Passive Range of Motion   Left Shoulder   Abduction: 165 degrees   External rotation 45°: 70 degrees      Right Shoulder   Abduction: 170 degrees  External rotation 45°: 40 degrees     Strength/Myotome Testing      Left Shoulder      Planes of Motion   Flexion: 3+   Abduction: 4     Right Shoulder      Planes of Motion   Flexion: 4+   Abduction: 4     Active Range of Motion      Lumbar   Flexion:  WFL  Extension:  WFL  Left lateral flexion:  WFL  Right lateral flexion:  WFL  Left rotation:  WFL  Right rotation:  WFL     Lumbar       Strength/Myotome Testing      Left Hip   Planes of Motion   Flexion: 4-  Extension: 4  Abduction: 3+     Right Hip   Planes of Motion   Flexion: 4  Extension:  4  Abduction: 3     Left Knee   Flexion: 4+  Extension: 5  Quadriceps contraction: good     Right Knee   Flexion: 4+  Extension: 5  Quadriceps contraction: fair     Muscle Activation   Patient unable to activate left transverse abdominals, left multifidus, right transverse abdominals and right multifidus.      Additional Muscle Activation Details  Fair L TA and b/l multifidi activation  Fair R TA activation      Assessment: Pt presents to the clinic for re evaluation. Pt showed good improvement in R shoulder flexion and abduction strength, but continues to have difficulty and limitations with L should strength. Pt also showed good improvement in R shoulder abduction AROM without pain, but still has some limitations in R shoulder flexion and abduction ROM. Pt showed good improvement in L/S mobility and does not have any functional deficits in motion. Pt continues to have some limitations with core activation but is able to activate multifidi slightly better. Pt improved BLE strength but L hip strength is currently lagging behind gains experienced by RLE. Overall, pt has shown good improvement since last RE but still has some functional deficits that could improve with formal skilled PT. Pt performed BLE strengthening activities well with mild fatigue post session.      Plan: Continue per plan of care.  Progress treatment as tolerated.       Precautions: S/p R reverse TSA on 10/24/23.  As per MD: biceps/triceps strengthening up to her weight restriction (25#).  Light deltoid and periscapular strength to tolerance.  No overhead weights. No RTC strengthening     Date 5/14 5/16 5/28 5/30 6/6        Visit # 23 24 25 26 27        FOTO             Re-eval     done          Manuals 5/14 5/16 5/28 5/30 6/6                                                            Neuro Re-Ed 5/14 5/16 5/28 5/30 6/6        Quad sets     Nv R        clamshells 2x10 purp TB ea 2x10 purp TB ea 2x10 purp TB ea 2x10 purp TB ea 2x10 purp TB ea       "  marching 2x10 purp h/l ea 2x10 purp h/l ea 2x10 purp h/l ea 2x10 purp stand ea 2x10 Purp h/l ea        bridges 2x10 5\" w purp abd 2x10 5\" w purp abd 2x10 5\" w purp abd 2x10 5\" w purp abd 2x10 5\" purp abd        LAQ 2x10 3# ea 2x10 3# ea 2x10 5\" 3# ea 2x10 5\" 3# ea 2x10 5\" 3# ea SAQ        Scap punches                          Pt Edu             Ther Ex 5/14 5/16 5/28 5/30 6/6        bike 6' 6' 6' 6' 6'        UBE   nv 3'/3'         SLR 2x10 3# ea 2x10 3# 2x10 3# ea          S/l hip abd 2x10 3# ea 2x10 3# ea 2x10 0# ea 2x10 0# ea 2x10 0# ea        Standing hip abd/ext 2x10 purp ea  2x10 purp ea 2x10 purple ea 2x10 purple ea         sidestepping 3 laps purp 3 laps purp 3 laps purp          Leg press 2x15 205# BLE 2x15 205# BLE  2x15 205# BLE 2x15 205# BLE         Monster walks 3 laps purp 3 laps purp           Shoulder raises   2x10 2# flex, abd to 90 2x10 2# flex, abd to 90         Rows/exts 2x15 black ea 2x15 black ea 2x15 black ea 2x15 10# ea         Bicep curls 2x10 5# ea 2x10 5# ea           Tricep exts 2x10 BTB 2x10 BTB           Wall slides abd 10x5\" flex, abd 10x5\" flex, abd           Finger ladder                          Ther Activity             squats             Step ups             Gait Training             hurdles                          Modalities                                                                                      "

## 2024-06-10 DIAGNOSIS — E66.01 SEVERE OBESITY (BMI 35.0-39.9) WITH COMORBIDITY (HCC): ICD-10-CM

## 2024-06-10 RX ORDER — TOPIRAMATE 25 MG/1
25 TABLET ORAL 2 TIMES DAILY
Qty: 180 TABLET | Refills: 1 | Status: SHIPPED | OUTPATIENT
Start: 2024-06-10

## 2024-06-10 NOTE — PROGRESS NOTES
"Daily Note     Today's date: 2024  Patient name: Li Torre  : 1960  MRN: [id number]  Referring provider: Derrick Vasquez MD  Dx:   Encounter Diagnosis     ICD-10-CM    1. S/P reverse total shoulder arthroplasty, right  Z96.611       2. Ambulatory dysfunction  R26.2       3. Weakness of both lower extremities  R29.898           Start Time: 1030  Stop Time: 1115  Total time in clinic (min): 45 minutes    Subjective: Maki reports her shoulder are feeling much better but she still has weakness in her LE. Denies any issues post last PT session. Reports compliance with HEP.       Objective: See treatment diary below      Assessment: Maki Tolerated treatment well with appropriate muscle fatigue experienced with ex's.  She is making steady gains towards goals with improved LE strength. She is challenged with standing ex's w/o UE support with noted instability.  Required vc's t/o session for proper positioning with ex's.  Maki would benefit from continued PT and compliance with HEP.        Plan: Continue per plan of care.      Precautions: S/p R reverse TSA on 10/24/23.  As per MD: biceps/triceps strengthening up to her weight restriction (25#).  Light deltoid and periscapular strength to tolerance.  No overhead weights. No RTC strengthening     Date        Visit # 23 24 25 26 27 28       FOTO             Re-eval     done          Manuals                                                            Neuro Re-Ed        Quad sets     Nv R        clamshells 2x10 purp TB ea 2x10 purp TB ea 2x10 purp TB ea 2x10 purp TB ea 2x10 purp TB ea Purple 3\" 2x10 ea        marching 2x10 purp h/l ea 2x10 purp h/l ea 2x10 purp h/l ea 2x10 purp stand ea 2x10 Purp h/l ea Std 1 UE supp x10 ea        bridges 2x10 5\" w purp abd 2x10 5\" w purp abd 2x10 5\" w purp abd 2x10 5\" w purp abd 2x10 5\" purp abd Pur abd 5\" 2x10        LAQ 2x10 3# ea 2x10 " "3# ea 2x10 5\" 3# ea 2x10 5\" 3# ea 2x10 5\" 3# ea SAQ 5\" 2x10 3# LAQ       Scap punches                          Pt Edu             Ther Ex 5/14 5/16 5/28 5/30 6/6 6/11       bike 6' 6' 6' 6' 6' 6 min        UBE   nv 3'/3'         SLR 2x10 3# ea 2x10 3# 2x10 3# ea   W/ QS 3# 2x10 ea        S/l hip abd 2x10 3# ea 2x10 3# ea 2x10 0# ea 2x10 0# ea 2x10 0# ea 0# 2x10 ea        Standing hip abd/ext 2x10 purp ea  2x10 purp ea 2x10 purple ea 2x10 purple ea  Purple 2x10 ea        sidestepping 3 laps purp 3 laps purp 3 laps purp   Purple 4 laps         Leg press 2x15 205# BLE 2x15 205# BLE  2x15 205# BLE 2x15 205# BLE  205# 2x15       Monster walks 3 laps purp 3 laps purp           Shoulder raises   2x10 2# flex, abd to 90 2x10 2# flex, abd to 90  NP       Rows/exts 2x15 black ea 2x15 black ea 2x15 black ea 2x15 10# ea  NP       Bicep curls 2x10 5# ea 2x10 5# ea           Tricep exts 2x10 BTB 2x10 BTB           Wall slides abd 10x5\" flex, abd 10x5\" flex, abd           Finger ladder                          Ther Activity             squats             Step ups             Gait Training             hurdles                          Modalities                                                                                        "

## 2024-06-11 ENCOUNTER — OFFICE VISIT (OUTPATIENT)
Dept: PHYSICAL THERAPY | Facility: CLINIC | Age: 64
End: 2024-06-11
Payer: COMMERCIAL

## 2024-06-11 DIAGNOSIS — R29.898 WEAKNESS OF BOTH LOWER EXTREMITIES: ICD-10-CM

## 2024-06-11 DIAGNOSIS — R26.2 AMBULATORY DYSFUNCTION: ICD-10-CM

## 2024-06-11 DIAGNOSIS — Z96.611 S/P REVERSE TOTAL SHOULDER ARTHROPLASTY, RIGHT: Primary | ICD-10-CM

## 2024-06-11 PROCEDURE — 97112 NEUROMUSCULAR REEDUCATION: CPT

## 2024-06-11 PROCEDURE — 97110 THERAPEUTIC EXERCISES: CPT

## 2024-06-13 ENCOUNTER — OFFICE VISIT (OUTPATIENT)
Dept: PHYSICAL THERAPY | Facility: CLINIC | Age: 64
End: 2024-06-13
Payer: COMMERCIAL

## 2024-06-13 DIAGNOSIS — R26.2 AMBULATORY DYSFUNCTION: ICD-10-CM

## 2024-06-13 DIAGNOSIS — R29.898 WEAKNESS OF BOTH LOWER EXTREMITIES: ICD-10-CM

## 2024-06-13 DIAGNOSIS — Z96.611 S/P REVERSE TOTAL SHOULDER ARTHROPLASTY, RIGHT: Primary | ICD-10-CM

## 2024-06-13 PROCEDURE — 97112 NEUROMUSCULAR REEDUCATION: CPT

## 2024-06-13 PROCEDURE — 97110 THERAPEUTIC EXERCISES: CPT

## 2024-06-13 NOTE — PROGRESS NOTES
Daily Note     Today's date: 2024  Patient name: Li Torre  : 1960  MRN: [id number]  Referring provider: Derrick Vasquez MD  Dx:   Encounter Diagnosis     ICD-10-CM    1. S/P reverse total shoulder arthroplasty, right  Z96.611       2. Ambulatory dysfunction  R26.2       3. Weakness of both lower extremities  R29.898           Start Time: 1155  Stop Time: 1235  Total time in clinic (min): 40 minutes    Subjective: Pt stated that she had a fall on Tuesday at home after tripping on a rug, was able to catch herself with a wall but then went to the floor, where she was able to pick herself back up. Pt stated that she does not have any pain or soreness since the fall. Pt stated that her shoulders are feeling good today and would like to focus on her legs as her RLE continues to feel weaker than the L.       Objective: See treatment diary below      Assessment: Pt tolerated warm up on bike well at beginning of session without increase in discomfort during or after activity. Pt performed standing BLE strengthening activities well today with mild fatigue post performance. Pt continues to have difficulty with abduction strengthening activities with excessive compensation in standing and in sidelying, continue to strengthen as tolerated. Pt would benefit from continued skilled PT to improve BLE strength, mobility, balance, gait, and functional ability.     Plan: Continue per plan of care.  Progress treatment as tolerated.       Precautions: S/p R reverse TSA on 10/24/23.  As per MD: biceps/triceps strengthening up to her weight restriction (25#).  Light deltoid and periscapular strength to tolerance.  No overhead weights. No RTC strengthening     Date       Visit # 23 24 25 26 27 28 29      FOTO             Re-eval     done          Manuals                                                           Neuro Re-Ed   "6/13      Quad sets     Nv R        clamshells 2x10 purp TB ea 2x10 purp TB ea 2x10 purp TB ea 2x10 purp TB ea 2x10 purp TB ea Purple 3\" 2x10 ea  2x10 3\" purp      marching 2x10 purp h/l ea 2x10 purp h/l ea 2x10 purp h/l ea 2x10 purp stand ea 2x10 Purp h/l ea Std 1 UE supp x10 ea  2x10 h/l purp TB      bridges 2x10 5\" w purp abd 2x10 5\" w purp abd 2x10 5\" w purp abd 2x10 5\" w purp abd 2x10 5\" purp abd Pur abd 5\" 2x10  2x10 5\" purp TB abd      LAQ 2x10 3# ea 2x10 3# ea 2x10 5\" 3# ea 2x10 5\" 3# ea 2x10 5\" 3# ea SAQ 5\" 2x10 3# LAQ 2x10 5\" 3# ea       Scap punches                          Pt Edu             Ther Ex 5/14 5/16 5/28 5/30 6/6 6/11 6/13      bike 6' 6' 6' 6' 6' 6 min  6'      UBE   nv 3'/3'         SLR 2x10 3# ea 2x10 3# 2x10 3# ea   W/ QS 3# 2x10 ea  2x10 3# ea      S/l hip abd 2x10 3# ea 2x10 3# ea 2x10 0# ea 2x10 0# ea 2x10 0# ea 0# 2x10 ea  2x10 0# ea      Standing hip abd/ext 2x10 purp ea  2x10 purp ea 2x10 purple ea 2x10 purple ea  Purple 2x10 ea  2x10 purp TB ea      sidestepping 3 laps purp 3 laps purp 3 laps purp   Purple 4 laps   4 laps purp TB      Leg press 2x15 205# BLE 2x15 205# BLE  2x15 205# BLE 2x15 205# BLE  205# 2x15 2x15 205# BLE      Monster walks 3 laps purp 3 laps purp           Shoulder raises   2x10 2# flex, abd to 90 2x10 2# flex, abd to 90  NP 2x10 2# flex, abd to 90       Rows/exts 2x15 black ea 2x15 black ea 2x15 black ea 2x15 10# ea  NP np      Bicep curls 2x10 5# ea 2x10 5# ea     2x10 5# ea      Tricep exts 2x10 BTB 2x10 BTB           Wall slides abd 10x5\" flex, abd 10x5\" flex, abd           Finger ladder                          Ther Activity       6/13      squats             Step ups       2x10 ea up 0R      Gait Training             hurdles                          Modalities                                                                                          "

## 2024-06-18 ENCOUNTER — OFFICE VISIT (OUTPATIENT)
Dept: PHYSICAL THERAPY | Facility: CLINIC | Age: 64
End: 2024-06-18
Payer: COMMERCIAL

## 2024-06-18 DIAGNOSIS — R29.898 WEAKNESS OF BOTH LOWER EXTREMITIES: ICD-10-CM

## 2024-06-18 DIAGNOSIS — R26.2 AMBULATORY DYSFUNCTION: ICD-10-CM

## 2024-06-18 DIAGNOSIS — Z96.611 S/P REVERSE TOTAL SHOULDER ARTHROPLASTY, RIGHT: Primary | ICD-10-CM

## 2024-06-18 PROCEDURE — 97110 THERAPEUTIC EXERCISES: CPT

## 2024-06-18 PROCEDURE — 97112 NEUROMUSCULAR REEDUCATION: CPT

## 2024-06-18 NOTE — PROGRESS NOTES
"Daily Note     Today's date: 2024  Patient name: Li Torre  : 1960  MRN: [id number]  Referring provider: Derrick Vasquez MD  Dx:   Encounter Diagnosis     ICD-10-CM    1. S/P reverse total shoulder arthroplasty, right  Z96.611       2. Ambulatory dysfunction  R26.2       3. Weakness of both lower extremities  R29.898           Start Time: 1030  Stop Time: 1120  Total time in clinic (min): 50 minutes    Subjective: Pt states she is feeling ok today, no new c/o pain or symptoms today.       Objective: See treatment diary below      Assessment: Pt tolerated warm up on bike well at beginning of session without increase in discomfort during or after activity. Pt continues to show good tolerance to current exercise program.  Pt would benefit from continued skilled PT to improve BLE strength, mobility, balance, gait, and functional ability. Continue to progress as able.     Plan: Continue per plan of care.  Progress treatment as tolerated.       Precautions: S/p R reverse TSA on 10/24/23.  As per MD: biceps/triceps strengthening up to her weight restriction (25#).  Light deltoid and periscapular strength to tolerance.  No overhead weights. No RTC strengthening     Date      Visit # 23 24 25 26 27 28 29 30     FOTO             Re-eval     done          Manuals  6/6                                                          Neuro Re-Ed /     Quad sets     Nv R        clamshells 2x10 purp TB ea 2x10 purp TB ea 2x10 purp TB ea 2x10 purp TB ea 2x10 purp TB ea Purple 3\" 2x10 ea  2x10 3\" purp 2x10 3\" purp     marching 2x10 purp h/l ea 2x10 purp h/l ea 2x10 purp h/l ea 2x10 purp stand ea 2x10 Purp h/l ea Std 1 UE supp x10 ea  2x10 h/l purp TB 2x10 h/l purp TB     bridges 2x10 5\" w purp abd 2x10 5\" w purp abd 2x10 5\" w purp abd 2x10 5\" w purp abd 2x10 5\" purp abd Pur abd 5\" 2x10  2x10 5\" purp TB abd 2x10 " "5\" purp TB abd     LAQ 2x10 3# ea 2x10 3# ea 2x10 5\" 3# ea 2x10 5\" 3# ea 2x10 5\" 3# ea SAQ 5\" 2x10 3# LAQ 2x10 5\" 3# ea  2x10 5\" 3# ea      Scap punches                          Pt Edu             Ther Ex 5/14 5/16 5/28 5/30 6/6 6/11 6/13 6/18     bike 6' 6' 6' 6' 6' 6 min  6' 6'     UBE   nv 3'/3'         SLR 2x10 3# ea 2x10 3# 2x10 3# ea   W/ QS 3# 2x10 ea  2x10 3# ea 2x10 3# ea     S/l hip abd 2x10 3# ea 2x10 3# ea 2x10 0# ea 2x10 0# ea 2x10 0# ea 0# 2x10 ea  2x10 0# ea 2x10 0# ea     Standing hip abd/ext 2x10 purp ea  2x10 purp ea 2x10 purple ea 2x10 purple ea  Purple 2x10 ea  2x10 purp TB ea 2x10 purp TB ea     sidestepping 3 laps purp 3 laps purp 3 laps purp   Purple 4 laps   4 laps purp TB 4 laps purp TB     Leg press 2x15 205# BLE 2x15 205# BLE  2x15 205# BLE 2x15 205# BLE  205# 2x15 2x15 205# BLE 2x15 205# BLE     Monster walks 3 laps purp 3 laps purp           Shoulder raises   2x10 2# flex, abd to 90 2x10 2# flex, abd to 90  NP 2x10 2# flex, abd to 90  2x10 2# flex, abd to 90      Rows/exts 2x15 black ea 2x15 black ea 2x15 black ea 2x15 10# ea  NP np      Bicep curls 2x10 5# ea 2x10 5# ea     2x10 5# ea 2x10 5# ea     Tricep exts 2x10 BTB 2x10 BTB           Wall slides abd 10x5\" flex, abd 10x5\" flex, abd           Finger ladder                          Ther Activity       6/13 6/18     squats             Step ups       2x10 ea up 0R 2x10 ea up 0R     Gait Training        6/18     hurdles                          Modalities        6/18                                                                                  "

## 2024-06-20 ENCOUNTER — APPOINTMENT (OUTPATIENT)
Dept: PHYSICAL THERAPY | Facility: CLINIC | Age: 64
End: 2024-06-20
Payer: COMMERCIAL

## 2024-06-21 ENCOUNTER — APPOINTMENT (OUTPATIENT)
Dept: PHYSICAL THERAPY | Facility: CLINIC | Age: 64
End: 2024-06-21
Payer: COMMERCIAL

## 2024-06-21 ENCOUNTER — OFFICE VISIT (OUTPATIENT)
Dept: PHYSICAL THERAPY | Facility: CLINIC | Age: 64
End: 2024-06-21
Payer: COMMERCIAL

## 2024-06-21 DIAGNOSIS — R26.2 AMBULATORY DYSFUNCTION: ICD-10-CM

## 2024-06-21 DIAGNOSIS — Z96.611 S/P REVERSE TOTAL SHOULDER ARTHROPLASTY, RIGHT: Primary | ICD-10-CM

## 2024-06-21 DIAGNOSIS — R29.898 WEAKNESS OF BOTH LOWER EXTREMITIES: ICD-10-CM

## 2024-06-21 PROBLEM — Z00.00 MEDICARE WELCOME EXAM: Status: RESOLVED | Noted: 2024-05-22 | Resolved: 2024-06-21

## 2024-06-21 PROCEDURE — 97110 THERAPEUTIC EXERCISES: CPT

## 2024-06-21 PROCEDURE — 97112 NEUROMUSCULAR REEDUCATION: CPT

## 2024-06-25 ENCOUNTER — OFFICE VISIT (OUTPATIENT)
Dept: PHYSICAL THERAPY | Facility: CLINIC | Age: 64
End: 2024-06-25
Payer: COMMERCIAL

## 2024-06-25 ENCOUNTER — TELEPHONE (OUTPATIENT)
Age: 64
End: 2024-06-25

## 2024-06-25 DIAGNOSIS — R26.2 AMBULATORY DYSFUNCTION: ICD-10-CM

## 2024-06-25 DIAGNOSIS — Z96.611 S/P REVERSE TOTAL SHOULDER ARTHROPLASTY, RIGHT: Primary | ICD-10-CM

## 2024-06-25 DIAGNOSIS — R29.898 WEAKNESS OF BOTH LOWER EXTREMITIES: ICD-10-CM

## 2024-06-25 PROCEDURE — 97112 NEUROMUSCULAR REEDUCATION: CPT

## 2024-06-25 PROCEDURE — 97110 THERAPEUTIC EXERCISES: CPT

## 2024-06-25 NOTE — TELEPHONE ENCOUNTER
Pt called in stating she had a fall and hit her head against the wall doing better now. But want to know what to be observed due to her current medication she is on. Warm transferred the call to practice clinical went unanswered. Please call back the patient. Thanks

## 2024-06-25 NOTE — PROGRESS NOTES
"Daily Note     Today's date: 2024  Patient name: Li Torre  : 1960  MRN: [id number]  Referring provider: Derrick Vasquez MD  Dx:   Encounter Diagnosis     ICD-10-CM    1. S/P reverse total shoulder arthroplasty, right  Z96.611       2. Ambulatory dysfunction  R26.2       3. Weakness of both lower extremities  R29.898           Start Time: 955  Stop Time: 1035  Total time in clinic (min): 40 minutes    Subjective: Pt stated that she is doing well today with no new complaints but is tired at start of session.       Objective: See treatment diary below      Assessment: Pt tolerated warm up on bike well at beginning of session without increase in discomfort during or after activity. Pt continues to show slightly decreased strength in the RLE compared to the LLE but shows improved tolerance overall to activities. Discussed current PT POC with pt, pt is doing well and suggested transition to [facility] soon, pt agreed but stated that she would like to come in for two more weeks of skilled formal PT, agreed with pt. Pt would benefit from continued skilled PT to improve BLE strength, mobility, flexibility, gait, balance, and functional ability.     Plan: Continue per plan of care.  Progress treatment as tolerated.       Precautions: S/p R reverse TSA on 10/24/23.  As per MD: biceps/triceps strengthening up to her weight restriction (25#).  Light deltoid and periscapular strength to tolerance.  No overhead weights. No RTC strengthening     Date       Visit #    26 27 28 29 30 31 32   FOTO             Re-eval     done          Manuals                                                            Neuro Re-Ed       Quad sets     Nv R    20x3\" ea 20x3\" ea   clamshells    2x10 purp TB ea 2x10 purp TB ea Purple 3\" 2x10 ea  2x10 3\" purp 2x10 3\" purp 2x10 3\" purp  2x10 3\" purp   marching    2x10 purp stand ea 2x10 Purp h/l ea " "Std 1 UE supp x10 ea  2x10 h/l purp TB 2x10 h/l purp TB 2x10 h/l purp TB 2x10 h/l purp TB   bridges    2x10 5\" w purp abd 2x10 5\" purp abd Pur abd 5\" 2x10  2x10 5\" purp TB abd 2x10 5\" purp TB abd 2x10 5\" purple TB abd 2x10 5\" purple TB abd   LAQ    2x10 5\" 3# ea 2x10 5\" 3# ea SAQ 5\" 2x10 3# LAQ 2x10 5\" 3# ea  2x10 5\" 3# ea  2x10 5\" 3# ea 2x10 5\" 3# ea   Scap punches                          Pt Edu             Ther Ex    5/30 6/6 6/11 6/13 6/18 6/21 6/25   bike    6' 6' 6 min  6' 6' 6' 6'   UBE    3'/3'         SLR      W/ QS 3# 2x10 ea  2x10 3# ea 2x10 3# ea 2x10 3# ea 2x10 3# ea   S/l hip abd    2x10 0# ea 2x10 0# ea 0# 2x10 ea  2x10 0# ea 2x10 0# ea 2x10 0# ea 2x10 0# ea   Standing hip abd/ext    2x10 purple ea  Purple 2x10 ea  2x10 purp TB ea 2x10 purp TB ea 2x10 purp TB ea 2x10 purp TB ea   sidestepping      Purple 4 laps   4 laps purp TB 4 laps purp TB     Leg press    2x15 205# BLE  205# 2x15 2x15 205# BLE 2x15 205# BLE 2x15 205# BLE 2x15 205# BLE   Monster walks             Shoulder raises    2x10 2# flex, abd to 90  NP 2x10 2# flex, abd to 90  2x10 2# flex, abd to 90      Rows/exts    2x15 10# ea  NP np   2x15 10# ea   Bicep curls       2x10 5# ea 2x10 5# ea  2x10 5# ea   Tricep exts             Wall slides abd             Finger ladder                          Ther Activity       6/13 6/18 6/21 6/25   squats             Step ups       2x10 ea up 0R 2x10 ea up 0R 2x10 ea up 0R    Gait Training        6/18     hurdles                          Modalities        6/18                                                                                      "

## 2024-06-26 ENCOUNTER — HOSPITAL ENCOUNTER (EMERGENCY)
Facility: HOSPITAL | Age: 64
Discharge: HOME | End: 2024-06-26
Attending: EMERGENCY MEDICINE
Payer: COMMERCIAL

## 2024-06-26 ENCOUNTER — APPOINTMENT (EMERGENCY)
Dept: CT IMAGING | Facility: HOSPITAL | Age: 64
End: 2024-06-26
Payer: COMMERCIAL

## 2024-06-26 VITALS
HEART RATE: 85 BPM | OXYGEN SATURATION: 96 % | SYSTOLIC BLOOD PRESSURE: 133 MMHG | RESPIRATION RATE: 18 BRPM | DIASTOLIC BLOOD PRESSURE: 60 MMHG | TEMPERATURE: 98.4 F
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
## 2024-06-26 DIAGNOSIS — S01.81XA FACIAL LACERATION, INITIAL ENCOUNTER: ICD-10-CM

## 2024-06-26 DIAGNOSIS — R07.81 RIB PAIN ON RIGHT SIDE: ICD-10-CM

## 2024-06-26 LAB
ANION GAP SERPL CALCULATED.3IONS-SCNC: 6 MMOL/L (ref 4–13)
APTT PPP: 32 SECONDS (ref 23–37)
BASOPHILS # BLD AUTO: 0.05 THOUSANDS/ÂΜL (ref 0–0.1)
BASOPHILS NFR BLD AUTO: 1 % (ref 0–1)
BUN SERPL-MCNC: 11 MG/DL (ref 5–25)
CALCIUM SERPL-MCNC: 8.8 MG/DL (ref 8.4–10.2)
CHLORIDE SERPL-SCNC: 103 MMOL/L (ref 96–108)
CO2 SERPL-SCNC: 27 MMOL/L (ref 21–32)
CREAT SERPL-MCNC: 0.77 MG/DL (ref 0.6–1.3)
EOSINOPHIL # BLD AUTO: 0.15 THOUSAND/ÂΜL (ref 0–0.61)
EOSINOPHIL NFR BLD AUTO: 1 % (ref 0–6)
ERYTHROCYTE [DISTWIDTH] IN BLOOD BY AUTOMATED COUNT: 13.3 % (ref 11.6–15.1)
GFR SERPL CREATININE-BSD FRML MDRD: 81 ML/MIN/1.73SQ M
GLUCOSE SERPL-MCNC: 87 MG/DL (ref 65–140)
HCT VFR BLD AUTO: 36.7 % (ref 34.8–46.1)
HGB BLD-MCNC: 12.1 G/DL (ref 11.5–15.4)
IMM GRANULOCYTES # BLD AUTO: 0.04 THOUSAND/UL (ref 0–0.2)
IMM GRANULOCYTES NFR BLD AUTO: 0 % (ref 0–2)
INR PPP: 1.1 (ref 0.84–1.19)
LYMPHOCYTES # BLD AUTO: 2.11 THOUSANDS/ÂΜL (ref 0.6–4.47)
LYMPHOCYTES NFR BLD AUTO: 19 % (ref 14–44)
MCH RBC QN AUTO: 29.9 PG (ref 26.8–34.3)
MCHC RBC AUTO-ENTMCNC: 33 G/DL (ref 31.4–37.4)
MCV RBC AUTO: 91 FL (ref 82–98)
MONOCYTES # BLD AUTO: 1.17 THOUSAND/ÂΜL (ref 0.17–1.22)
MONOCYTES NFR BLD AUTO: 11 % (ref 4–12)
NEUTROPHILS # BLD AUTO: 7.54 THOUSANDS/ÂΜL (ref 1.85–7.62)
NEUTS SEG NFR BLD AUTO: 68 % (ref 43–75)
NRBC BLD AUTO-RTO: 0 /100 WBCS
PLATELET # BLD AUTO: 329 THOUSANDS/UL (ref 149–390)
PMV BLD AUTO: 9.6 FL (ref 8.9–12.7)
POTASSIUM SERPL-SCNC: 4.1 MMOL/L (ref 3.5–5.3)
PROTHROMBIN TIME: 14.4 SECONDS (ref 11.6–14.5)
RBC # BLD AUTO: 4.05 MILLION/UL (ref 3.81–5.12)
SODIUM SERPL-SCNC: 136 MMOL/L (ref 135–147)
WBC # BLD AUTO: 11.06 THOUSAND/UL (ref 4.31–10.16)

## 2024-06-26 PROCEDURE — 99285 EMERGENCY DEPT VISIT HI MDM: CPT | Performed by: PHYSICIAN ASSISTANT

## 2024-06-26 PROCEDURE — 70450 CT HEAD/BRAIN W/O DYE: CPT

## 2024-06-26 PROCEDURE — 85610 PROTHROMBIN TIME: CPT | Performed by: PHYSICIAN ASSISTANT

## 2024-06-26 PROCEDURE — 85025 COMPLETE CBC W/AUTO DIFF WBC: CPT | Performed by: PHYSICIAN ASSISTANT

## 2024-06-26 PROCEDURE — 12011 RPR F/E/E/N/L/M 2.5 CM/<: CPT | Performed by: PHYSICIAN ASSISTANT

## 2024-06-26 PROCEDURE — 71260 CT THORAX DX C+: CPT

## 2024-06-26 PROCEDURE — 85730 THROMBOPLASTIN TIME PARTIAL: CPT | Performed by: PHYSICIAN ASSISTANT

## 2024-06-26 PROCEDURE — 36415 COLL VENOUS BLD VENIPUNCTURE: CPT | Performed by: PHYSICIAN ASSISTANT

## 2024-06-26 PROCEDURE — 80048 BASIC METABOLIC PNL TOTAL CA: CPT | Performed by: PHYSICIAN ASSISTANT

## 2024-06-26 PROCEDURE — 72125 CT NECK SPINE W/O DYE: CPT

## 2024-06-26 PROCEDURE — 74177 CT ABD & PELVIS W/CONTRAST: CPT

## 2024-06-26 RX ORDER — ACETAMINOPHEN 325 MG/1
975 TABLET ORAL ONCE
Status: COMPLETED | OUTPATIENT
Start: 2024-06-26 | End: 2024-06-26

## 2024-06-26 RX ADMIN — MORPHINE SULFATE 2 MG: 2 INJECTION, SOLUTION INTRAMUSCULAR; INTRAVENOUS at 12:52

## 2024-06-26 RX ADMIN — IOHEXOL 100 ML: 350 INJECTION, SOLUTION INTRAVENOUS at 11:09

## 2024-06-26 RX ADMIN — ACETAMINOPHEN 975 MG: 325 TABLET, FILM COATED ORAL at 09:57

## 2024-06-26 RX ADMIN — Medication 1 APPLICATION: at 12:05

## 2024-06-26 NOTE — TELEPHONE ENCOUNTER
Spoke with pt, she is going to go to [facility] - she stated she has a lot of pain in her ribs

## 2024-06-26 NOTE — ED PROVIDER NOTES
"History  Chief Complaint   Patient presents with    Fall     Pt reports has a fall yesterday, hitting her forehead laceration cover in triage, pt on eliquis, denies LOC, N/V, some HA. R-ribs pain      Patient is a 63-year-old female history of DVT and pulmonary embolism on Eliquis presenting to the ER for evaluation after fall yesterday.  Patient reports that she tripped on a rug while walking to the bathroom and fell into the paneled wall.  She reports that she sustained a laceration to her forehead and must of fallen on her right side as she has been feeling pain throughout the right side of her ribs and her right sided flank.  She reports that she stayed home as she was trying to get seen by her PCP.  She reports that her PCP After this morning and recommended she go to the ER due to being on Eliquis.  She currently complains of only mild headache.  She denies any chest pain, shortness of breath, abdominal pain, visual changes, weakness in any of the extremities, or pain in any of her extremities.  She has not attempted any treatment.        Prior to Admission Medications   Prescriptions Last Dose Informant Patient Reported? Taking?   ARIPiprazole (ABILIFY) 5 mg tablet  Self Yes No   Sig: TAKE 1 TABLET EVERY MORNING (CORRECTION BY DR OF PREV SCRIPT)   B-D INSULIN SYRINGE 1CC/25GX1\" 25G X 1\" 1 ML MISC  Self Yes No   LORazepam (ATIVAN) 0.5 mg tablet   No No   Sig: Take 1 tablet (0.5 mg total) by mouth daily as needed for anxiety   Ubrelvy 100 MG tablet  Self Yes No   Sig: TAKE 1 TABLET TWICE A DAY AS NEEDED FOR HEADACHE, LIMIT 16 TABS PER MONTH   Patient not taking: Reported on 5/17/2024   amLODIPine (NORVASC) 5 mg tablet  Self Yes No   Sig: Take 5 mg by mouth daily   apixaban (Eliquis) 5 mg   No No   Sig: Take 1 tablet (5 mg total) by mouth 2 (two) times a day   atorvastatin (LIPITOR) 40 mg tablet  Self Yes No   Sig: TAKE 1 TABLET BY MOUTH EVERY DAY AT NIGHT   cholecalciferol (VITAMIN D3) 1,000 units tablet  Self " Yes No   dihydroergotamine (DHE) 1 mg/mL  Self Yes No   Sig: INJECT 1 ML (1 MG TOTAL) INJECT INTO THE MUSCLE AS NEEDED FOR MIGRAINE.   furosemide (LASIX) 20 mg tablet  Self Yes No   Sig: Take 20 mg by mouth as needed Pt reports calls her [facility] cardiologist Dr Michel before taking   gabapentin (NEURONTIN) 600 MG tablet  Self Yes No   Sig: Take 600 mg by mouth 3 (three) times a day   lamoTRIgine (LaMICtal) 200 MG tablet  Self Yes No   Sig: Take 200 mg by mouth daily.   meclizine (ANTIVERT) 12.5 MG tablet   No No   Sig: Take 1 tablet (12.5 mg total) by mouth every 8 (eight) hours as needed for dizziness for up to 7 days   methocarbamol (ROBAXIN) 750 mg tablet  Self Yes No   ondansetron (ZOFRAN-ODT) 4 mg disintegrating tablet  Self Yes No   Sig: TAKE 1 TABLET BY MOUTH EVERY 8 HOURS AS NEEDED FOR NAUSEA AND VOMITING   potassium chloride (K-DUR,KLOR-CON) 20 mEq tablet  Self Yes No   Sig: Take 20 mEq by mouth daily     topiramate (TOPAMAX) 25 mg tablet   No No   Sig: TAKE 1 TABLET BY MOUTH TWICE A DAY      Facility-Administered Medications: None       Past Medical History:   Diagnosis Date    Adrenal insufficiency (Andrés's disease) (HCC)     Anemia 09 15 2022    Was found in bloodwork done on that day    Anxiety     Arthritis     Bilateral pulmonary contusion 07/03/2020    Bipolar disorder     Cervical radiculopathy     Chest pain     seen in ER 9/18, dx with gas    Chronic back pain     Chronic pain disorder     lumbar    Closed head injury with brief loss of consciousness (HCC) 10/15/2019    Contusion of right hand 07/03/2021    Depression     DVT, lower extremity (HCC)     1991 and 2019 now on eliquis    Exercises 5 to 6 times per week     5 days per week with weights/band and also goes to PT 2x/week    Fall down stairs 07/03/2020    Fibromyalgia     Fibromyalgia, primary     GERD (gastroesophageal reflux disease)     Headache(784.0) 2018    I get migraines. See Neurologist for this.    Hematoma of parietal scalp  "07/03/2020    History of Clostridioides difficile infection     History of MRSA infection     pt denies having this    History of recent fall 07/2021    \"possibly injured left shoulder'    History of TIAs     cannot remember details    Hypertension     Hypokalemia     Intractable migraine without aura and without status migrainosus 10/02/2019    Left shoulder pain     \"not too bad\" goes to PT 2x/week    Migraine     Obesity 2019    Psychiatric disorder     Anxiety, major depression, bipolar    Spinal stenosis     Syncope 2014    orthostatic hypotension    Wears dentures     upper    Wears glasses        Past Surgical History:   Procedure Laterality Date    APPENDECTOMY      CAST APPLICATION Left 07/04/2020    Procedure: Application short-arm splint;  Surgeon: Sony Zhang MD;  [facility]: [facility];  Service: Orthopedics    COLONOSCOPY      FL INJECTION LEFT SHOULDER (ARTHROGRAM)  11/10/2021    GASTRIC RESTRICTION SURGERY      Gastric Sleeve Nov 2015    HIP SURGERY  2016    Hip Replacement    HYSTERECTOMY      DONALD    JOINT REPLACEMENT      Left Knee 2011 and Right Hip 2010    KNEE SURGERY  2015    Knee Replacement    OOPHORECTOMY      ORIF WRIST FRACTURE Left 07/04/2020    Procedure: Open reduction and internal fixation left radius and ulnar shaft fracture;  Surgeon: Sony Zhang MD;  [facility];  Service: Orthopedics    AK ARTHROPLASTY GLENOHUMERAL JOINT TOTAL SHOULDER Left 03/30/2021    Procedure: ARTHROPLASTY SHOULDER - anatomic;  Surgeon: Daryn Daniel MD;  [facility];  Service: Orthopedics    AK ARTHROPLASTY GLENOHUMERAL JOINT TOTAL SHOULDER Right 10/24/2023    Procedure: ANATOMIC VS REVERSE TOTAL SHOULDER ARTHROPLASTY, WITH BICEPS TENDONESIS;  Surgeon: Daryn Daniel MD;  Location[facility];  Service: Orthopedics    AK NATIVIDAD SHOULDER ARTHRPLSTY HUMERAL&GLENOID COMPNT Left 12/21/2021    Procedure: REVISION OF TOTAL SHOULDER ARTHROPLASTY TO REVERSE TOTAL SHOULDER " ARTHROPLASTY;  Surgeon: Daryn Daniel MD;  [facility];  Service: Orthopedics       Family History   Problem Relation Age of Onset    Breast cancer Mother 37    Migraines Mother     Arthritis Mother     Depression Mother     Cancer Mother     Anxiety disorder Mother     Kidney disease Father     Heart disease Father     Diabetes Father     Arthritis Father     Hypertension Father     Brain cancer Brother     Seizures Brother      I have reviewed and agree with the history as documented.    E-Cigarette/Vaping    E-Cigarette Use Never User     Cartridges/Day 0     Comments 0      E-Cigarette/Vaping Substances    Nicotine No     THC No     CBD No     Flavoring No     Other No     Unknown No      Social History     Tobacco Use    Smoking status: Former     Current packs/day: 0.00     Average packs/day: 0.2 packs/day for 20.0 years (4.0 ttl pk-yrs)     Types: Cigarettes     Start date: 1998     Quit date: 2008     Years since quittin.4    Smokeless tobacco: Never    Tobacco comments:     quit   Vaping Use    Vaping status: Never Used   Substance Use Topics    Alcohol use: Not Currently     Alcohol/week: 2.0 standard drinks of alcohol     Types: 1 Glasses of wine, 1 Standard drinks or equivalent per week     Comment: occasionally    Drug use: Never     Comment: na       Review of Systems   Constitutional:  Negative for chills and fever.   HENT:  Negative for ear pain and sore throat.    Eyes:  Negative for pain and visual disturbance.   Respiratory:  Negative for cough and shortness of breath.    Cardiovascular:  Negative for chest pain and palpitations.   Gastrointestinal:  Negative for abdominal pain and vomiting.   Genitourinary:  Positive for flank pain (R sided rib and flank pain). Negative for dysuria and hematuria.   Musculoskeletal:  Negative for arthralgias and back pain.   Skin:  Negative for color change and rash.   Neurological:  Positive for headaches. Negative for seizures  and syncope.   All other systems reviewed and are negative.      Physical Exam  Physical Exam  Vitals and nursing note reviewed.   Constitutional:       General: She is not in acute distress.     Appearance: She is well-developed.   HENT:      Head: Normocephalic. Laceration present.     Eyes:      Conjunctiva/sclera: Conjunctivae normal.   Cardiovascular:      Rate and Rhythm: Normal rate and regular rhythm.      Heart sounds: No murmur heard.  Pulmonary:      Effort: Pulmonary effort is normal. No respiratory distress.      Breath sounds: Normal breath sounds.   Chest:       Abdominal:      Palpations: Abdomen is soft.      Tenderness: There is no abdominal tenderness.   Musculoskeletal:         General: No swelling.      Cervical back: Neck supple.      Comments: No C-spine, T-spine, or L-spine tenderness to palpation.  Range of motion intact at spine.  No step-off, deformity, or midline tenderness to palpation.   Skin:     General: Skin is warm and dry.      Capillary Refill: Capillary refill takes less than 2 seconds.   Neurological:      Mental Status: She is alert.   Psychiatric:         Mood and Affect: Mood normal.         Vital Signs  ED Triage Vitals   Temperature Pulse Respirations Blood Pressure SpO2   06/26/24 0931 06/26/24 0931 06/26/24 0931 06/26/24 0933 06/26/24 0931   98.4 °F (36.9 °C) 89 18 116/61 96 %      Temp Source Heart Rate Source Patient Position - Orthostatic VS BP Location FiO2 (%)   06/26/24 0931 06/26/24 0931 06/26/24 0931 06/26/24 0931 --   Oral Monitor Sitting Right arm       Pain Score       06/26/24 0931       8           Vitals:    06/26/24 0931 06/26/24 0933 06/26/24 1240   BP:  116/61 133/60   Pulse: 89  85   Patient Position - Orthostatic VS: Sitting Sitting Lying         Visual Acuity      ED Medications  Medications   acetaminophen (TYLENOL) tablet 975 mg (975 mg Oral Given 6/26/24 0957)   iohexol (OMNIPAQUE) 350 MG/ML injection (SINGLE-DOSE) 100 mL (100 mL Intravenous Given  6/26/24 1109)   LET gel 1 Application (1 Application Topical Given 6/26/24 1205)   morphine injection 2 mg (2 mg Intravenous Given 6/26/24 1252)       Diagnostic Studies  Results Reviewed       Procedure Component Value Units Date/Time    Basic metabolic panel [[id number]] Collected: 06/26/24 1023    Lab Status: Final result Specimen: Blood from Arm, Left Updated: 06/26/24 1058     Sodium 136 mmol/L      Potassium 4.1 mmol/L      Chloride 103 mmol/L      CO2 27 mmol/L      ANION GAP 6 mmol/L      BUN 11 mg/dL      Creatinine 0.77 mg/dL      Glucose 87 mg/dL      Calcium 8.8 mg/dL      eGFR 81 ml/min/1.73sq m     Narrative:      National Kid[employer] Foundation guidelines for Chronic Kidney Disease (CKD):     Stage 1 with normal or high GFR (GFR > 90 mL/min/1.73 square meters)    Stage 2 Mild CKD (GFR = 60-89 mL/min/1.73 square meters)    Stage 3A Moderate CKD (GFR = 45-59 mL/min/1.73 square meters)    Stage 3B Moderate CKD (GFR = 30-44 mL/min/1.73 square meters)    Stage 4 Severe CKD (GFR = 15-29 mL/min/1.73 square meters)    Stage 5 End Stage CKD (GFR <15 mL/min/1.73 square meters)  Note: GFR calculation is accurate only with a steady state creatinine    Protime-INR [[id number]]  (Normal) Collected: 06/26/24 1023    Lab Status: Final result Specimen: Blood from Arm, Left Updated: 06/26/24 1046     Protime 14.4 seconds      INR 1.10    APTT [[id number]]  (Normal) Collected: 06/26/24 1023    Lab Status: Final result Specimen: Blood from Arm, Left Updated: 06/26/24 1046     PTT 32 seconds     CBC and differential [[id number]]  (Abnormal) Collected: 06/26/24 1023    Lab Status: Final result Specimen: Blood from Arm, Left Updated: 06/26/24 1029     WBC 11.06 Thousand/uL      RBC 4.05 Million/uL      Hemoglobin 12.1 g/dL      Hematocrit 36.7 %      MCV 91 fL      MCH 29.9 pg      MCHC 33.0 g/dL      RDW 13.3 %      MPV 9.6 fL      Platelets 329 Thousands/uL      nRBC 0 /100 WBCs      Segmented % 68 %      Immature Grans %  "0 %      Lymphocytes % 19 %      Monocytes % 11 %      Eosinophils Relative 1 %      Basophils Relative 1 %      Absolute Neutrophils 7.54 Thousands/µL      Absolute Immature Grans 0.04 Thousand/uL      Absolute Lymphocytes 2.11 Thousands/µL      Absolute Monocytes 1.17 Thousand/µL      Eosinophils Absolute 0.15 Thousand/µL      Basophils Absolute 0.05 Thousands/µL                    CT head without contrast   Final Result by Guillermo Daley MD (06/26 1144)      No acute intracranial abnormality.                  Workstation performed: [id number]         CT cervical spine without contrast   Final Result by Guillermo Daley MD (06/26 1154)      No cervical spine fracture or traumatic malalignment.                  Workstation performed: [id number]         CT chest abdomen pelvis w contrast   Final Result by Guillermo Daley MD (06/26 1143)      No findings of acute traumatic injury in the chest, abdomen or pelvis.               Workstation performed: [id number]                    Procedures  Universal Protocol:  Consent: Verbal consent obtained.  Risks and benefits: risks, benefits and alternatives were discussed  Consent given by: patient  Time out: Immediately prior to procedure a \"time out\" was called to verify the correct patient, procedure, equipment, support staff and site/side marked as required.  Patient understanding: patient states understanding of the procedure being performed  Patient identity confirmed: verbally with patient and arm band  Laceration repair    Date/Time: 6/26/2024 10:30 AM    Performed by: Livier Peacock PA-C  Authorized by: Livier Peacock PA-C  Body area: head/neck  Location details: forehead  Laceration length: 2 cm  Foreign bodies: no foreign bodies  Tendon involvement: none  Nerve involvement: none  Vascular damage: no    Anesthesia:  Local Anesthetic: LET (lido, epi, tetracaine)    Sedation:  Patient sedated: no        Procedure Details:  Irrigation solution: " saline  Irrigation method: syringe  Debridement: none  Degree of undermining: none  Skin closure: Steri-Strips and glue  Number of sutures: Glue and 3 Steri-Strips.  Approximation: close  Approximation difficulty: simple  Patient tolerance: patient tolerated the procedure well with no immediate complications               ED Course                                             Medical Decision Making  Patient is a 63-year-old female history of DVT and pulmonary embolism on Eliquis presenting to the ER for evaluation after fall yesterday when she was walking to the bathroom and tripped on a rug.  Patient is on Eliquis.  Denies LOC.  On exam patient is well-appearing in no acute stress.  Vital signs within normal limits.  Physical examination reveals tenderness to palpation at posterior lateral aspect of right ribs and right flank.  No bruising or underlying bony deformity noted.  There is also a laceration to forehead which is superficial and bleeding is controlled.  Examination is otherwise unremarkable.  I discussed with patient that considering the trauma and fall we will scan head and neck to evaluate for any acute traumatic injury as well as chest/abdomen/pelvis considering the right flank and rib pain.  Patient is understanding and agreeable with plan.    CBC and BMP are unremarkable.  CT head without intracranial abnormality.  CT C-spine without fracture or traumatic well-imaged.  CT chest abdomen pelvis without any acute traumatic injury in chest abdomen or pelvis.  Laceration was cleansed thoroughly and repair was performed with Steri-Strips and glue without any immediate complications.  Extensively discussed all imaging and lab results with patient.  She reports improvement of headache and rib pain after morphine.  Advised close follow-up with PCP for reevaluation.  Advised strict return precautions to the ER.  Patient is understanding agreeable plan.  Patient has been stable throughout stay in ER stable for  discharge.    Problems Addressed:  Facial laceration, initial encounter: acute illness or injury  Fall, initial encounter: acute illness or injury  Rib pain on right side: acute illness or injury    Amount and/or Complexity of Data Reviewed  Labs: ordered.  Radiology: ordered.    Risk  OTC drugs.  Prescription drug management.             Disposition  Final diagnoses:   Fall, initial encounter   Facial laceration, initial encounter   Rib pain on right side     Time reflects when diagnosis was documented in both MDM as applicable and the Disposition within this note       Time User Action Codes Description Comment    6/26/2024 12:37 PM Livier Peacock [W19.XXXA] Fall, initial encounter     6/26/2024 12:37 PM Livier Peacock Add [S01.81XA] Facial laceration, initial encounter     6/26/2024 12:37 PM Livier Peacock Add [R07.81] Rib pain on right side           ED Disposition       ED Disposition   Discharge    Condition   Stable    Date/Time   Wed Jun 26, 2024 12:37 PM    Comment   Li Torre discharge to home/self care.                   Follow-up Information       Follow up With Specialties Details Why Contact Info Additional Information    Derrick Vasquez MD Family Medicine Schedule an appointment as soon as possible for a visit in 1 day  [address] [place] [address]  [phone]       [facility] Emergency Department Emergency Medicine  If symptoms worsen [address] [place] [id number]  [phone] [facility] Emergency Department, [address] [place] [address], [phone]            Discharge Medication List as of 6/26/2024 12:38 PM        CONTINUE these medications which have NOT CHANGED    Details   amLODIPine (NORVASC) 5 mg tablet Take 5 mg by mouth daily, Starting Thu 1/12/2023, Historical Med      apixaban (Eliquis) 5 mg Take 1 tablet (5 mg total) by mouth 2 (two) times a day, Starting Mon 4/1/2024, Normal      ARIPiprazole (ABILIFY) 5  "mg tablet TAKE 1 TABLET EVERY MORNING (CORRECTION BY DR OF PREV BALL), Historical Med      atorvastatin (LIPITOR) 40 mg tablet TAKE 1 TABLET BY MOUTH EVERY DAY AT NIGHT, Historical Med      B-D INSULIN SYRINGE 1CC/25GX1\" 25G X 1\" 1 ML MISC Historical Med      cholecalciferol (VITAMIN D3) 1,000 units tablet Starting Fri 9/16/2022, Historical Med      dihydroergotamine (DHE) 1 mg/mL INJECT 1 ML (1 MG TOTAL) INJECT INTO THE MUSCLE AS NEEDED FOR MIGRAINE., Historical Med      furosemide (LASIX) 20 mg tablet Take 20 mg by mouth as needed Pt reports calls her [facility] cardiologist Dr Michel before taking, Starting Tue 9/21/2021, Historical Med      gabapentin (NEURONTIN) 600 MG tablet Take 600 mg by mouth 3 (three) times a day, Starting Thu 2/10/2022, Historical Med      lamoTRIgine (LaMICtal) 200 MG tablet Take 200 mg by mouth daily., Historical Med      LORazepam (ATIVAN) 0.5 mg tablet Take 1 tablet (0.5 mg total) by mouth daily as needed for anxiety, Starting Wed 6/19/2024, Normal      meclizine (ANTIVERT) 12.5 MG tablet Take 1 tablet (12.5 mg total) by mouth every 8 (eight) hours as needed for dizziness for up to 7 days, Starting Wed 12/27/2023, Until Fri 5/17/2024 at 2359, Normal      methocarbamol (ROBAXIN) 750 mg tablet Historical Med      ondansetron (ZOFRAN-ODT) 4 mg disintegrating tablet TAKE 1 TABLET BY MOUTH EVERY 8 HOURS AS NEEDED FOR NAUSEA AND VOMITING, Historical Med      potassium chloride (K-DUR,KLOR-CON) 20 mEq tablet Take 20 mEq by mouth daily  , Historical Med      topiramate (TOPAMAX) 25 mg tablet TAKE 1 TABLET BY MOUTH TWICE A DAY, Starting Mon 6/10/2024, Normal      Ubrelvy 100 MG tablet TAKE 1 TABLET TWICE A DAY AS NEEDED FOR HEADACHE, LIMIT 16 TABS PER MONTH, Historical Med             No discharge procedures on file.    PDMP Review         Value Time User    PDMP Reviewed  Yes 6/19/2024  9:49 AM Derrick Vasquez MD            ED Provider  Electronically Signed by             Livier Peacock, " SIRISHA  06/26/24 1823

## 2024-06-26 NOTE — TELEPHONE ENCOUNTER
As she is on eliquis, I typically recommend ED eval if she falls and hits her head. If she declines despite the risks, she should monitor and immediately go if she develops headache, nausea, vomiting, dizziness, etc.

## 2024-06-27 ENCOUNTER — APPOINTMENT (OUTPATIENT)
Dept: PHYSICAL THERAPY | Facility: CLINIC | Age: 64
End: 2024-06-27
Payer: COMMERCIAL

## 2024-06-27 ENCOUNTER — PATIENT OUTREACH (OUTPATIENT)
Dept: CASE MANAGEMENT | Facility: OTHER | Age: 64
End: 2024-06-27

## 2024-06-27 ENCOUNTER — OFFICE VISIT (OUTPATIENT)
Dept: FAMILY MEDICINE CLINIC | Facility: CLINIC | Age: 64
End: 2024-06-27
Payer: COMMERCIAL

## 2024-06-27 VITALS
DIASTOLIC BLOOD PRESSURE: 84 MMHG | SYSTOLIC BLOOD PRESSURE: 130 MMHG | WEIGHT: 240 LBS | OXYGEN SATURATION: 97 % | HEART RATE: 91 BPM | BODY MASS INDEX: 36.37 KG/M2 | HEIGHT: 68 IN | RESPIRATION RATE: 16 BRPM | TEMPERATURE: 99 F

## 2024-06-27 DIAGNOSIS — S20.211D CONTUSION OF RIB ON RIGHT SIDE, SUBSEQUENT ENCOUNTER: ICD-10-CM

## 2024-06-27 DIAGNOSIS — Z78.0 ASYMPTOMATIC MENOPAUSE: Primary | ICD-10-CM

## 2024-06-27 PROCEDURE — G2211 COMPLEX E/M VISIT ADD ON: HCPCS | Performed by: FAMILY MEDICINE

## 2024-06-27 PROCEDURE — 99213 OFFICE O/P EST LOW 20 MIN: CPT | Performed by: FAMILY MEDICINE

## 2024-06-27 RX ORDER — PREDNISONE 20 MG/1
40 TABLET ORAL DAILY
Qty: 10 TABLET | Refills: 0 | Status: SHIPPED | OUTPATIENT
Start: 2024-06-27 | End: 2024-07-02

## 2024-06-27 RX ORDER — METHOCARBAMOL 750 MG/1
750 TABLET, FILM COATED ORAL EVERY 8 HOURS PRN
Qty: 30 TABLET | Refills: 0 | Status: SHIPPED | OUTPATIENT
Start: 2024-06-27

## 2024-06-27 NOTE — PROGRESS NOTES
Notification received that the patient went to the ED after falling. She had a forehead laceration & she is on blood thinners. C/o right sided rib pain. Laceration was treated with glue & steristrips. CTs of the head, spine, & abdomen/pelvis were negative for acute injury. No significant labs. WBC was slightly elevated. She was given acetaminophen 975 mg & morphine prior to discharging home with instructions to follow up with her PCP. She has an appointment this morning.     Called the patient to check in. Left a brief voicemail with a call back number.

## 2024-06-28 ENCOUNTER — TELEPHONE (OUTPATIENT)
Age: 64
End: 2024-06-28

## 2024-06-28 DIAGNOSIS — S20.211D CONTUSION OF RIB ON RIGHT SIDE, SUBSEQUENT ENCOUNTER: Primary | ICD-10-CM

## 2024-06-28 RX ORDER — TRAMADOL HYDROCHLORIDE 50 MG/1
50 TABLET ORAL DAILY PRN
Qty: 20 TABLET | Refills: 0 | Status: SHIPPED | OUTPATIENT
Start: 2024-06-28

## 2024-06-28 NOTE — TELEPHONE ENCOUNTER
Ordered tramdol for breakthrough pain only short term, do not take with lorazepam. PAPDMP reviewed and filled.

## 2024-06-28 NOTE — TELEPHONE ENCOUNTER
Patient called asking if the provider can call in some pain medicaiton into the pharmacy for the patient. Patient states she has rib pains following a fall. Please review and advise.

## 2024-07-01 NOTE — PROGRESS NOTES
"Assessment/Plan:    62 y/o woman with: rib contusion. Will give steroid burst and muscle relaxer for breakthrough pain. Discussed supportive care and return parameters.     No problem-specific Assessment & Plan notes found for this encounter.       Diagnoses and all orders for this visit:    Asymptomatic menopause  -     DXA bone density spine hip and pelvis; Future    Contusion of rib on right side, subsequent encounter  -     predniSONE 20 mg tablet; Take 2 tablets (40 mg total) by mouth daily for 5 days  -     methocarbamol (ROBAXIN) 750 mg tablet; Take 1 tablet (750 mg total) by mouth every 8 (eight) hours as needed for muscle spasms          Subjective:     Chief Complaint   Patient presents with    Fall     Follow up        Patient ID: Li Torre is a 63 y.o. female.    Patient is a 62 y/o woman who presents for follow-up after accidental fall onto her face. She went to the ED and had facial laceration glued. She is c/o rib pain no fevers chills nausea or vomiting.    Fall        The following portions of the patient's history were reviewed and updated as appropriate: allergies, current medications, past family history, past medical history, past social history, past surgical history and problem list.    Review of Systems   Constitutional: Negative.    HENT: Negative.     Eyes: Negative.    Respiratory: Negative.     Cardiovascular: Negative.    Gastrointestinal: Negative.    Endocrine: Negative.    Genitourinary: Negative.    Musculoskeletal:  Positive for arthralgias.   Allergic/Immunologic: Negative.    Neurological: Negative.    Hematological: Negative.    Psychiatric/Behavioral: Negative.     All other systems reviewed and are negative.        Objective:      /84 (BP Location: Left arm, Patient Position: Sitting, Cuff Size: Large)   Pulse 91   Temp 99 °F (37.2 °C) (Tympanic)   Resp 16   Ht 5' 8\" (1.727 m)   Wt 109 kg (240 lb)   SpO2 97%   BMI 36.49 kg/m²          Physical " Exam  Constitutional:       Appearance: She is well-developed.   HENT:      Head: Atraumatic.      Right Ear: External ear normal.      Left Ear: External ear normal.   Eyes:      Extraocular Movements: EOM normal.      Conjunctiva/sclera: Conjunctivae normal.      Pupils: Pupils are equal, round, and reactive to light.   Cardiovascular:      Rate and Rhythm: Normal rate and regular rhythm.      Heart sounds: Normal heart sounds.   Pulmonary:      Effort: Pulmonary effort is normal. No respiratory distress.      Breath sounds: Normal breath sounds.   Abdominal:      General: There is no distension.      Palpations: Abdomen is soft.      Tenderness: There is no abdominal tenderness. There is no guarding or rebound.   Musculoskeletal:         General: Normal range of motion.      Cervical back: Normal range of motion.   Skin:     General: Skin is warm and dry.   Neurological:      Mental Status: She is alert and oriented to person, place, and time.      Cranial Nerves: No cranial nerve deficit.   Psychiatric:         Mood and Affect: Mood and affect normal.         Behavior: Behavior normal.         Thought Content: Thought content normal.         Judgment: Judgment normal.

## 2024-07-02 ENCOUNTER — OFFICE VISIT (OUTPATIENT)
Dept: PHYSICAL THERAPY | Facility: CLINIC | Age: 64
End: 2024-07-02
Payer: COMMERCIAL

## 2024-07-02 DIAGNOSIS — R29.898 WEAKNESS OF BOTH LOWER EXTREMITIES: ICD-10-CM

## 2024-07-02 DIAGNOSIS — Z96.611 S/P REVERSE TOTAL SHOULDER ARTHROPLASTY, RIGHT: Primary | ICD-10-CM

## 2024-07-02 DIAGNOSIS — R26.2 AMBULATORY DYSFUNCTION: ICD-10-CM

## 2024-07-02 PROCEDURE — 97112 NEUROMUSCULAR REEDUCATION: CPT

## 2024-07-02 PROCEDURE — 97110 THERAPEUTIC EXERCISES: CPT

## 2024-07-02 NOTE — PROGRESS NOTES
"Daily Note     Today's date: 2024  Patient name: Li Torre  : 1960  MRN: [id number]  Referring provider: Derrick Vasquez MD  Dx:   Encounter Diagnosis     ICD-10-CM    1. S/P reverse total shoulder arthroplasty, right  Z96.611       2. Ambulatory dysfunction  R26.2       3. Weakness of both lower extremities  R29.898           Start Time: 1200  Stop Time: 1235  Total time in clinic (min): 35 minutes    Subjective: Maki reports increased soreness today from a fall she sustained last week when she tripped on her rug. States she has some rib contusions and a cut on her head. Denies any HA or neck pain, but reports soreness in her waist  from her rib injury and request to hold her side lying and standing ex's today.       Objective: See treatment diary below      Assessment: Maki Tolerated modified treatment well today. Demonstrated appropriate muscle fatigue with her supine and seated ex's, however denies any increased pain of discomfort. She required occ vc's t/o session for proper positioning with ex's.  Maki  would benefit from continued PT and compliance with HEP as able.       Plan: Continue per plan of care.      Precautions: S/p R reverse TSA on 10/24/23.  As per MD: biceps/triceps strengthening up to her weight restriction (25#).  Light deltoid and periscapular strength to tolerance.  No overhead weights. No RTC strengthening     Date    Visit # 33   26 27 28 29 30 31 32   FOTO             Re-eval     done          Manuals                                                         Neuro Re-Ed    Quad sets 5\"x20 ea     Nv R    20x3\" ea 20x3\" ea   clamshells H/L 3\" 2x10 pur TB    2x10 purp TB ea 2x10 purp TB ea Purple 3\" 2x10 ea  2x10 3\" purp 2x10 3\" purp 2x10 3\" purp  2x10 3\" purp   marching H/L pur TB 20x ea    2x10 purp stand ea 2x10 Purp h/l ea Std 1 UE supp x10 ea  2x10 h/l " "purp TB 2x10 h/l purp TB 2x10 h/l purp TB 2x10 h/l purp TB   bridges Held today    2x10 5\" w purp abd 2x10 5\" purp abd Pur abd 5\" 2x10  2x10 5\" purp TB abd 2x10 5\" purp TB abd 2x10 5\" purple TB abd 2x10 5\" purple TB abd   LAQ 3# 5\" 2x10 ea    2x10 5\" 3# ea 2x10 5\" 3# ea SAQ 5\" 2x10 3# LAQ 2x10 5\" 3# ea  2x10 5\" 3# ea  2x10 5\" 3# ea 2x10 5\" 3# ea   Scap punches                          Pt Edu             Ther Ex 7/2 5/30 6/6 6/11 6/13 6/18 6/21 6/25   bike 6'   6' 6' 6 min  6' 6' 6' 6'   UBE    3'/3'         SLR 3\" 2x10 ea      W/ QS 3# 2x10 ea  2x10 3# ea 2x10 3# ea 2x10 3# ea 2x10 3# ea   S/l hip abd Held today    2x10 0# ea 2x10 0# ea 0# 2x10 ea  2x10 0# ea 2x10 0# ea 2x10 0# ea 2x10 0# ea   Standing hip abd/ext Held today    2x10 purple ea  Purple 2x10 ea  2x10 purp TB ea 2x10 purp TB ea 2x10 purp TB ea 2x10 purp TB ea   sidestepping Held today      Purple 4 laps   4 laps purp TB 4 laps purp TB     Leg press 205# 30x    2x15 205# BLE  205# 2x15 2x15 205# BLE 2x15 205# BLE 2x15 205# BLE 2x15 205# BLE   Monster walks             Shoulder raises    2x10 2# flex, abd to 90  NP 2x10 2# flex, abd to 90  2x10 2# flex, abd to 90      Rows/exts Held today    2x15 10# ea  NP np   2x15 10# ea   Bicep curls 5# 2x10 ea       2x10 5# ea 2x10 5# ea  2x10 5# ea   Tricep exts             Wall slides abd             Finger ladder                          Ther Activity 7/2 6/13 6/18 6/21 6/25   squats             Step ups Held today       2x10 ea up 0R 2x10 ea up 0R 2x10 ea up 0R    Gait Training 7/2 6/18     hurdles                          Modalities        6/18                                                                                        "

## 2024-07-05 ENCOUNTER — APPOINTMENT (OUTPATIENT)
Dept: PHYSICAL THERAPY | Facility: CLINIC | Age: 64
End: 2024-07-05
Payer: COMMERCIAL

## 2024-07-09 ENCOUNTER — OFFICE VISIT (OUTPATIENT)
Dept: PHYSICAL THERAPY | Facility: CLINIC | Age: 64
End: 2024-07-09
Payer: COMMERCIAL

## 2024-07-09 DIAGNOSIS — Z96.611 S/P REVERSE TOTAL SHOULDER ARTHROPLASTY, RIGHT: Primary | ICD-10-CM

## 2024-07-09 DIAGNOSIS — R26.2 AMBULATORY DYSFUNCTION: ICD-10-CM

## 2024-07-09 DIAGNOSIS — R29.898 WEAKNESS OF BOTH LOWER EXTREMITIES: ICD-10-CM

## 2024-07-09 PROCEDURE — 97110 THERAPEUTIC EXERCISES: CPT

## 2024-07-09 PROCEDURE — 97112 NEUROMUSCULAR REEDUCATION: CPT

## 2024-07-09 NOTE — PROGRESS NOTES
Daily Note     Today's date: 2024  Patient name: Li Torre  : 1960  MRN: [id number]  Referring provider: Derrick Vasquez MD  Dx:   Encounter Diagnosis     ICD-10-CM    1. S/P reverse total shoulder arthroplasty, right  Z96.611       2. Ambulatory dysfunction  R26.2       3. Weakness of both lower extremities  R29.898           Start Time: 1155  Stop Time: 1235  Total time in clinic (min): 40 minutes    Subjective: Pt stated that she is feeling better today compared to last visit, but continues to have some discomfort in her R ribs where she had the contusion after her fall. Pt reported that her shoulders have been doing well and has not had any issues over the past two weeks.       Objective: See treatment diary below      Assessment: Pt tolerated warm up on bike well at beginning of session without increase in discomfort during or after activity. Pt was able to tolerate standing BLE strengthening activities well today but did have mild discomfort in her ribs after completing sets of reps during step ups. Pt was also able to perform bridges today with purple theraband better compared to last visit but still had some discomfort and soreness post performance. Continue to progress and reintroduce exercises to pt's tolerance. Pt would benefit from continued skilled PT to improve BLE strength, mobility, gait, balance, ROM, flexibility, and functional ability.       Plan: Continue per plan of care.  Progress treatment as tolerated.       Precautions: S/p R reverse TSA on 10/24/23.  As per MD: biceps/triceps strengthening up to her weight restriction (25#).  Light deltoid and periscapular strength to tolerance.  No overhead weights. No RTC strengthening     Date    Visit # 33 34  26 27 28 29 30 31 32   FOTO             Re-eval     done          Manuals                                                         Neuro Re-Ed   "7/9 5/30 6/6 6/11 6/13 6/18 6/21 6/25   Quad sets 5\"x20 ea  20x5\" ea   Nv R    20x3\" ea 20x3\" ea   clamshells H/L 3\" 2x10 pur TB  2x10 5\" ea purp s/l  2x10 purp TB ea 2x10 purp TB ea Purple 3\" 2x10 ea  2x10 3\" purp 2x10 3\" purp 2x10 3\" purp  2x10 3\" purp   marching H/L pur TB 20x ea  20x eapurp TB h/l  2x10 purp stand ea 2x10 Purp h/l ea Std 1 UE supp x10 ea  2x10 h/l purp TB 2x10 h/l purp TB 2x10 h/l purp TB 2x10 h/l purp TB   bridges Held today  2x10 5\" purp TB abd  2x10 5\" w purp abd 2x10 5\" purp abd Pur abd 5\" 2x10  2x10 5\" purp TB abd 2x10 5\" purp TB abd 2x10 5\" purple TB abd 2x10 5\" purple TB abd   LAQ 3# 5\" 2x10 ea  30x5\" 3# ea  2x10 5\" 3# ea 2x10 5\" 3# ea SAQ 5\" 2x10 3# LAQ 2x10 5\" 3# ea  2x10 5\" 3# ea  2x10 5\" 3# ea 2x10 5\" 3# ea   Scap punches                          Pt Edu             Ther Ex 7/2 7/9 5/30 6/6 6/11 6/13 6/18 6/21 6/25   bike 6' 6'  6' 6' 6 min  6' 6' 6' 6'   UBE    3'/3'         SLR 3\" 2x10 ea  2x10 ea    W/ QS 3# 2x10 ea  2x10 3# ea 2x10 3# ea 2x10 3# ea 2x10 3# ea   S/l hip abd Held today  np  2x10 0# ea 2x10 0# ea 0# 2x10 ea  2x10 0# ea 2x10 0# ea 2x10 0# ea 2x10 0# ea   Standing hip abd/ext Held today  2x10 purp ea  2x10 purple ea  Purple 2x10 ea  2x10 purp TB ea 2x10 purp TB ea 2x10 purp TB ea 2x10 purp TB ea   sidestepping Held today  4 laps purp TB    Purple 4 laps   4 laps purp TB 4 laps purp TB     Leg press 205# 30x  30x 205# BLE  2x15 205# BLE  205# 2x15 2x15 205# BLE 2x15 205# BLE 2x15 205# BLE 2x15 205# BLE   Monster walks             Shoulder raises    2x10 2# flex, abd to 90  NP 2x10 2# flex, abd to 90  2x10 2# flex, abd to 90      Rows/exts Held today  2x15 10# ea  2x15 10# ea  NP np   2x15 10# ea   Bicep curls 5# 2x10 ea  3x10 5# ea     2x10 5# ea 2x10 5# ea  2x10 5# ea   Tricep exts             Wall slides abd             Finger ladder                          Ther Activity 7/2 7/9 6/13 6/18 6/21 6/25   squats             Step ups Held today  2x10 ea 0R     2x10 ea " up 0R 2x10 ea up 0R 2x10 ea up 0R    Gait Training 7/2 6/18     hurdles                          Modalities        6/18

## 2024-07-11 ENCOUNTER — APPOINTMENT (OUTPATIENT)
Dept: PHYSICAL THERAPY | Facility: CLINIC | Age: 64
End: 2024-07-11
Payer: COMMERCIAL

## 2024-07-16 ENCOUNTER — APPOINTMENT (OUTPATIENT)
Dept: PHYSICAL THERAPY | Facility: CLINIC | Age: 64
End: 2024-07-16
Payer: COMMERCIAL

## 2024-07-18 ENCOUNTER — APPOINTMENT (OUTPATIENT)
Dept: PHYSICAL THERAPY | Facility: CLINIC | Age: 64
End: 2024-07-18
Payer: COMMERCIAL

## 2024-07-18 ENCOUNTER — TELEPHONE (OUTPATIENT)
Dept: HEMATOLOGY ONCOLOGY | Facility: CLINIC | Age: 64
End: 2024-07-18

## 2024-07-18 NOTE — TELEPHONE ENCOUNTER
Let message to let patient know her appointment with jacy paz was redcheduled from 09/12/2024 to 09/26/2024 @ 10:40 provided hopeline.

## 2024-07-23 ENCOUNTER — OFFICE VISIT (OUTPATIENT)
Dept: PHYSICAL THERAPY | Facility: CLINIC | Age: 64
End: 2024-07-23
Payer: COMMERCIAL

## 2024-07-23 DIAGNOSIS — Z96.611 S/P REVERSE TOTAL SHOULDER ARTHROPLASTY, RIGHT: Primary | ICD-10-CM

## 2024-07-23 DIAGNOSIS — R26.2 AMBULATORY DYSFUNCTION: ICD-10-CM

## 2024-07-23 DIAGNOSIS — R29.898 WEAKNESS OF BOTH LOWER EXTREMITIES: ICD-10-CM

## 2024-07-23 PROCEDURE — 97110 THERAPEUTIC EXERCISES: CPT

## 2024-07-23 PROCEDURE — 97112 NEUROMUSCULAR REEDUCATION: CPT

## 2024-07-23 NOTE — PROGRESS NOTES
"Daily Note     Today's date: 2024  Patient name: Li Torre  : 1960  MRN: [id number]  Referring provider: Derrick Vasquez MD  Dx:   Encounter Diagnosis     ICD-10-CM    1. S/P reverse total shoulder arthroplasty, right  Z96.611       2. Ambulatory dysfunction  R26.2       3. Weakness of both lower extremities  R29.898           Start Time: 1130  Stop Time: 1201  Total time in clinic (min): 31 minutes        Subjective: Pt offers no new complaints since last session.  States she is discharging today and has her HEP.        Objective: See treatment diary below      Assessment: Pt tolerated session well. Patient demonstrated progress towards goals with ability to perform TE's with no adverse effects and adequate technique t/o session. Patient will be discharged today.          Plan: D/C      Precautions: S/p R reverse TSA on 10/24/23.  As per MD: biceps/triceps strengthening up to her weight restriction (25#).  Light deltoid and periscapular strength to tolerance.  No overhead weights. No RTC strengthening     Date    Visit # 33 34 35 26 27 28 29 30 31 32   FOTO             Re-eval     done          Manuals                                                         Neuro Re-Ed /   Quad sets 5\"x20 ea  20x5\" ea 20x5\" ea  Nv R    20x3\" ea 20x3\" ea   clamshells H/L 3\" 2x10 pur TB  2x10 5\" ea purp s/l 2x10 5\" ea purp s/l 2x10 purp TB ea 2x10 purp TB ea Purple 3\" 2x10 ea  2x10 3\" purp 2x10 3\" purp 2x10 3\" purp  2x10 3\" purp   marching H/L pur TB 20x ea  20x eapurp TB h/l 20x eapurp TB h/l 2x10 purp stand ea 2x10 Purp h/l ea Std 1 UE supp x10 ea  2x10 h/l purp TB 2x10 h/l purp TB 2x10 h/l purp TB 2x10 h/l purp TB   bridges Held today  2x10 5\" purp TB abd 2x10 5\" purp TB abd 2x10 5\" w purp abd 2x10 5\" purp abd Pur abd 5\" 2x10  2x10 5\" purp TB abd 2x10 5\" purp TB abd 2x10 5\" " "purple TB abd 2x10 5\" purple TB abd   LAQ 3# 5\" 2x10 ea  30x5\" 3# ea 30x5\" 3# ea 2x10 5\" 3# ea 2x10 5\" 3# ea SAQ 5\" 2x10 3# LAQ 2x10 5\" 3# ea  2x10 5\" 3# ea  2x10 5\" 3# ea 2x10 5\" 3# ea   Scap punches                          Pt Edu             Ther Ex 7/2 7/9 7/23 5/30 6/6 6/11 6/13 6/18 6/21 6/25   bike 6' 6' 6' 6'  6 min  6' 6' 6' 6'   UBE    3'/3'         SLR 3\" 2x10 ea  2x10 ea 2x10 ea   W/ QS 3# 2x10 ea  2x10 3# ea 2x10 3# ea 2x10 3# ea 2x10 3# ea   S/l hip abd Held today  np np 2x10 0# ea 2x10 0# ea 0# 2x10 ea  2x10 0# ea 2x10 0# ea 2x10 0# ea 2x10 0# ea   Standing hip abd/ext Held today  2x10 purp ea 2x10 purp ea 2x10 purple ea  Purple 2x10 ea  2x10 purp TB ea 2x10 purp TB ea 2x10 purp TB ea 2x10 purp TB ea   sidestepping Held today  4 laps purp TB np   Purple 4 laps   4 laps purp TB 4 laps purp TB     Leg press 205# 30x  30x 205# BLE 30x 205# BLE 2x15 205# BLE  205# 2x15 2x15 205# BLE 2x15 205# BLE 2x15 205# BLE 2x15 205# BLE   Monster walks             Shoulder raises    2x10 2# flex, abd to 90  NP 2x10 2# flex, abd to 90  2x10 2# flex, abd to 90      Rows/exts Held today  2x15 10# ea 2x15 10# ea 2x15 10# ea  NP np   2x15 10# ea   Bicep curls 5# 2x10 ea  3x10 5# ea 3x10 5# ea    2x10 5# ea 2x10 5# ea  2x10 5# ea   Tricep exts             Wall slides abd             Finger ladder                          Ther Activity 7/2 7/9 7/23 6/13 6/18 6/21 6/25   squats             Step ups Held today  2x10 ea 0R 2x10  Ea 0R    2x10 ea up 0R 2x10 ea up 0R 2x10 ea up 0R    Gait Training 7/2 7/23 6/18     hurdles                          Modalities        6/18                                                                                          "

## 2024-07-25 ENCOUNTER — PATIENT OUTREACH (OUTPATIENT)
Dept: CASE MANAGEMENT | Facility: OTHER | Age: 64
End: 2024-07-25

## 2024-07-25 NOTE — PROGRESS NOTES
Chart reviewed:  No new ED visits or admissions visible in Epic.     Patient followed up with her PCP who ordered a DXA scan & placed her on prednisone x 5 days for the right rib contusion. They continued methocarbamol PRN & discontinued meclizine. She called back the following day due to continued rib pain & her PCP ordered PRN tramadol.     She discharged from PT on 7/23/24 having made good progress towards her goals s/p reverse right total shoulder arthroplasty.     She has a mammogram scheduled for 8/7/24.    Next chart review scheduled.

## 2024-08-02 ENCOUNTER — OFFICE VISIT (OUTPATIENT)
Dept: FAMILY MEDICINE CLINIC | Facility: CLINIC | Age: 64
End: 2024-08-02
Payer: COMMERCIAL

## 2024-08-02 VITALS
OXYGEN SATURATION: 97 % | SYSTOLIC BLOOD PRESSURE: 132 MMHG | HEIGHT: 68 IN | BODY MASS INDEX: 36.31 KG/M2 | DIASTOLIC BLOOD PRESSURE: 78 MMHG | WEIGHT: 239.6 LBS | HEART RATE: 100 BPM | TEMPERATURE: 97.6 F

## 2024-08-02 DIAGNOSIS — F31.81 BIPOLAR II DISORDER (HCC): Primary | ICD-10-CM

## 2024-08-02 DIAGNOSIS — K21.9 GASTROESOPHAGEAL REFLUX DISEASE WITHOUT ESOPHAGITIS: ICD-10-CM

## 2024-08-02 DIAGNOSIS — R10.11 RUQ DISCOMFORT: ICD-10-CM

## 2024-08-02 PROCEDURE — G2211 COMPLEX E/M VISIT ADD ON: HCPCS | Performed by: FAMILY MEDICINE

## 2024-08-02 PROCEDURE — 99214 OFFICE O/P EST MOD 30 MIN: CPT | Performed by: FAMILY MEDICINE

## 2024-08-02 RX ORDER — GABAPENTIN 600 MG/1
600 TABLET ORAL 3 TIMES DAILY
Qty: 270 TABLET | Refills: 1 | Status: SHIPPED | OUTPATIENT
Start: 2024-08-02

## 2024-08-02 RX ORDER — OMEPRAZOLE 40 MG/1
40 CAPSULE, DELAYED RELEASE ORAL DAILY
Qty: 90 CAPSULE | Refills: 1 | Status: SHIPPED | OUTPATIENT
Start: 2024-08-02

## 2024-08-02 RX ORDER — ARIPIPRAZOLE 5 MG/1
5 TABLET ORAL DAILY
Qty: 90 TABLET | Refills: 1 | Status: SHIPPED | OUTPATIENT
Start: 2024-08-02

## 2024-08-06 PROBLEM — K21.9 GASTROESOPHAGEAL REFLUX DISEASE WITHOUT ESOPHAGITIS: Status: ACTIVE | Noted: 2024-08-06

## 2024-08-06 PROBLEM — R10.11 RUQ DISCOMFORT: Status: ACTIVE | Noted: 2024-08-06

## 2024-08-06 NOTE — PROGRESS NOTES
Assessment/Plan:    64 y/o woman with: epigastric and RUQ pain with some heartburn and bipolar d/o. Will continue meds., begin PPI and check US. Discussed supportive care and return parameters.     No problem-specific Assessment & Plan notes found for this encounter.       Diagnoses and all orders for this visit:    Bipolar II disorder (HCC)  -     gabapentin (NEURONTIN) 600 MG tablet; Take 1 tablet (600 mg total) by mouth 3 (three) times a day  -     ARIPiprazole (ABILIFY) 5 mg tablet; Take 1 tablet (5 mg total) by mouth daily    Gastroesophageal reflux disease without esophagitis  -     omeprazole (PriLOSEC) 40 MG capsule; Take 1 capsule (40 mg total) by mouth daily    RUQ discomfort  -     US right upper quadrant; Future          Subjective:     Chief Complaint   Patient presents with    Nausea     Patient is complaining of severe nausea and heartburn, no further concerns, ng        Patient ID: Li Torre is a 63 y.o. female.    Patient is a 64 y/o woman who presents for follow-up on epigastric and RUQ pain with some heartburn, especially after certain kinds of food. No fevers chills nausea or vomiting. Pt also with Bipolar d/o admits stable on meds. No other acute complaints.    Nausea  Associated symptoms include nausea.       The following portions of the patient's history were reviewed and updated as appropriate: allergies, current medications, past family history, past medical history, past social history, past surgical history and problem list.    Review of Systems   Constitutional: Negative.    HENT: Negative.     Eyes: Negative.    Respiratory: Negative.     Cardiovascular: Negative.    Gastrointestinal:  Positive for nausea.   Endocrine: Negative.    Genitourinary: Negative.    Musculoskeletal: Negative.    Allergic/Immunologic: Negative.    Neurological: Negative.    Hematological: Negative.    Psychiatric/Behavioral: Negative.     All other systems reviewed and are negative.     "    Objective:      /78 (BP Location: Left arm, Patient Position: Sitting, Cuff Size: Large)   Pulse 100   Temp 97.6 °F (36.4 °C) (Temporal)   Ht 5' 8\" (1.727 m)   Wt 109 kg (239 lb 9.6 oz)   SpO2 97%   BMI 36.43 kg/m²          Physical Exam  Constitutional:       Appearance: She is well-developed.   HENT:      Head: Atraumatic.      Right Ear: External ear normal.      Left Ear: External ear normal.   Eyes:      Extraocular Movements: EOM normal.      Conjunctiva/sclera: Conjunctivae normal.      Pupils: Pupils are equal, round, and reactive to light.   Cardiovascular:      Rate and Rhythm: Normal rate and regular rhythm.      Heart sounds: Normal heart sounds.   Pulmonary:      Effort: Pulmonary effort is normal. No respiratory distress.      Breath sounds: Normal breath sounds.   Abdominal:      General: There is no distension.      Palpations: Abdomen is soft.      Tenderness: There is no abdominal tenderness. There is no guarding or rebound.   Musculoskeletal:         General: Normal range of motion.      Cervical back: Normal range of motion.   Skin:     General: Skin is warm and dry.   Neurological:      Mental Status: She is alert and oriented to person, place, and time.      Cranial Nerves: No cranial nerve deficit.   Psychiatric:         Mood and Affect: Mood and affect normal.         Behavior: Behavior normal.         Thought Content: Thought content normal.         Judgment: Judgment normal.         "

## 2024-08-18 ENCOUNTER — DOCUMENTATION (OUTPATIENT)
Dept: CASE MANAGEMENT | Facility: OTHER | Age: 64
End: 2024-08-18

## 2024-08-18 NOTE — MEDICAL HIGH UTILIZER
High Utilizer Care Plan for: Li Torre  Updated: 2024  Patient Name: Li Torre          MRN: [id number]       : 1960 Age: 63      Sex:  F   Utilization Background: She is a female with a history of migraine, Andrés's disease, Bipolar, Depression, Fibromyalgia, and HTN who presents to [facility] (mostly [place]) and [facility] with migraine. She has 14 ER visits, 9 admission, and 2 transfers to [facility] for Ketamine Infusions in 2019. Discussed with Neurology reveals that patient does not feel much better even after prolonged hospitalization.       In the last 90 days: 1 ED Visit    Treatment Recommendations:    ED Recommendations: Recommendations as per neurology: Give migraine protocol: using steroid protocol d/t toradol allergy. Recommend calling her [facility] [facility]ist to schedule close outpatient follow up. It is noted that the patient will provide other complaints to the ER providers to get admitted and then will complain of migraine in the hospital.     Would not offer transfer for Ketamine Infusion to this patient as it has not worked in the past. This should be left up to Neurology to determine if this is appropriate.       Inpatient Recommendations: Early consultation with neurology. Avoid narcotics/sedating agents due to over sedation in the past       Outpatient recommendations: Needs close follow up with [facility]. Needs CM to make sure that patient does not run out of her meds as she has in the past.      Situation: Patient who presents frequently for migraines. It seems that she has started using other chief complaints to get admitted to the hospital for migraine treatment. As per neurology colleague her headache symptomatology does not usually change with treatment      PMH/PSH:  Migraine, Depression, Bipolar, Fibromyalgia, HTN, Hx of DVT      Assessment                              Drivers of repeated utilization:                 Symptomatology     Community Resources in  place:    None - she has mentioned that she does not have a  for infusions  May need assistance with transportation and with medications   Patient Care Team:   Derrick Vasquez MD - PCP ([facility])  MOON Calle - Psych  MOON Hernandez/Luigi Jones MD - Neurology ([facility])  Altagracia Jade MD - Hematology ([facility])  OP Care Manager: Kristi Kelley RN              Care plan date and owner: Terry Duckworth Jr., PA-C 10/31/2019         Reviewed with patient before discharge?

## 2024-08-28 DIAGNOSIS — R11.0 NAUSEA: ICD-10-CM

## 2024-08-29 RX ORDER — ONDANSETRON 4 MG/1
TABLET, ORALLY DISINTEGRATING ORAL
Qty: 90 TABLET | Refills: 0 | Status: SHIPPED | OUTPATIENT
Start: 2024-08-29

## 2024-09-05 ENCOUNTER — TELEPHONE (OUTPATIENT)
Dept: FAMILY MEDICINE CLINIC | Facility: CLINIC | Age: 64
End: 2024-09-05

## 2024-09-05 DIAGNOSIS — R42 DIZZINESS: Primary | ICD-10-CM

## 2024-09-05 RX ORDER — MECLIZINE HCL 12.5 MG 12.5 MG/1
12.5 TABLET ORAL EVERY 8 HOURS PRN
Qty: 30 TABLET | Refills: 1 | Status: SHIPPED | OUTPATIENT
Start: 2024-09-05

## 2024-09-05 NOTE — TELEPHONE ENCOUNTER
ordered   [Eyeglasses] : currently wearing eyeglasses [Negative] : Heme/Lymph [Blurry Vision] : no blurred vision

## 2024-09-06 ENCOUNTER — OFFICE VISIT (OUTPATIENT)
Dept: PAIN MEDICINE | Facility: MEDICAL CENTER | Age: 64
End: 2024-09-06
Payer: COMMERCIAL

## 2024-09-06 VITALS
HEIGHT: 68 IN | DIASTOLIC BLOOD PRESSURE: 84 MMHG | BODY MASS INDEX: 36.43 KG/M2 | SYSTOLIC BLOOD PRESSURE: 151 MMHG | HEART RATE: 88 BPM

## 2024-09-06 DIAGNOSIS — M46.1 SACROILIITIS (HCC): Primary | ICD-10-CM

## 2024-09-06 DIAGNOSIS — M47.816 LUMBAR SPONDYLOSIS: ICD-10-CM

## 2024-09-06 PROCEDURE — G2211 COMPLEX E/M VISIT ADD ON: HCPCS

## 2024-09-06 PROCEDURE — 99214 OFFICE O/P EST MOD 30 MIN: CPT

## 2024-09-06 NOTE — PROGRESS NOTES
Assessment:  1. Sacroiliitis (HCC)    2. Lumbar spondylosis        Plan:  Schedule for bilateral sacroiliac joint injections.  Patient reports she had this injection at her previous pain practice and relief was greater than 50% lasting for over a year.  Complete benefits and risks of this procedure including hyperglycemia, bleeding, infection, local tissue reaction, nerve injury and allergic reaction were discussed at today's visit. The approach was demonstrated using models and literature was provided for home review. The patient was agreeable, verbalized understanding, and wishes to proceed with scheduling the procedure.  The patient is reporting 75% relief of facet mediated pain following the radiofrequnecy ablation of [bilateral L3-L5 medial branch nerves. The patient is very happy with the results of this procedure.  Follow-up after injection, or sooner if needed.    This patient is being managed for a complex and serious condition that requires ongoing, intensive medical management. The nature of this condition demands constant vigilance and a nuanced approach to treatment. The condition necessitates an in-depth and focused approach to management, including regular monitoring and potential coordination with other healthcare professionals.     Detailed Description of Visit Complexity: This visit involved an intricate evaluation and management of the patient's condition. The complexity of the visit was due to the need for a detailed assessment of the current state, consideration of potential complications, and a careful balancing of treatment options to manage the condition effectively.     Patient's Health Status and History: This patient has a significant pain history which requires regular and detailed management. The condition's impact on their life and health is substantial, necessitating a comprehensive and tailored approach to chronic ongoing care.       My impressions and treatment recommendations were  discussed in detail with the patient who verbalized understanding and had no further questions.  Discharge instructions were provided. I personally saw and examined the patient and I agree with the above discussed plan of care.    Orders Placed This Encounter   Procedures    FL spine and pain procedure     Standing Status:   Future     Standing Expiration Date:   9/6/2028     Order Specific Question:   Reason for Exam:     Answer:   Bilateral SIJ injections     Order Specific Question:   Anticoagulant hold needed?     Answer:   No     No orders of the defined types were placed in this encounter.      History of Present Illness:  Li Torre is a 63 y.o. female who presents for a follow up office visit after undergoing bilateral L3-L5 radiofrequency ablations.  Patient reports that she is now experiencing pain below the level of the ablation which is also a chronic issue for her.  This pain is localized to the lumbosacral region and does not radiate into the lower extremities.  Her pain is intermittent, and typically worse in the evening.  She is currently rating her symptoms a 9/10 in intensity at the very worst.  She describes the quality of the pain as cramping.  Patient states that her pain comes in waves and occurs in episodes lasting for about an hour at a time.  She states nothing has helped to resolve these issues including rest, heat/ice application, and muscle relaxers.  Patient had a bilateral sacroiliac joint injections for the same pain at her previous pain practice and reported greater than 12 months of at least 50% reduction in pain following the procedure.  She is interested in repeating this injection.    I have personally reviewed and/or updated the patient's past medical history, past surgical history, family history, social history, current medications, allergies, and vital signs today.     Review of Systems   Respiratory:  Negative for shortness of breath.    Cardiovascular:  Negative for  chest pain.   Gastrointestinal:  Negative for constipation, diarrhea, nausea and vomiting.   Musculoskeletal:  Positive for back pain. Negative for arthralgias, gait problem, joint swelling and myalgias.   Skin:  Negative for rash.   Neurological:  Negative for dizziness, seizures and weakness.   All other systems reviewed and are negative.      Patient Active Problem List   Diagnosis    Bipolar depression ([facility])    Hypokalemia    Adrenal insufficiency (Andrés's disease) ([facility])    Vertigo    Fibromyalgia    Osteoarthritis of lumbosacral spine without myelopathy    HLD (hyperlipidemia)    Orthostatic hypotension    History of TIAs    History of migraine    Neck pain    Low back pain    PE (pulmonary thromboembolism) ([facility])    Hypertension    Bipolar II disorder ([facility])    Chronic back pain    Anxiety    Leukocytosis    Chronic anticoagulation    Anemia    Ambulatory dysfunction    Edema    Closed fracture of distal ends of left radius and ulna    Fracture of multiple ribs of right side    History of anxiety    History of pulmonary embolism    Right distal ulnar fracture    Primary osteoarthritis of left shoulder    Status post total replacement of left shoulder    Alkalosis    Primary osteoarthritis of right shoulder    S/P reverse total shoulder arthroplasty, left    Iron deficiency anemia following bariatric surgery    B12 deficiency    Bilateral occipital neuralgia    Closed fracture of styloid process of radius    Fatigue    Anticoagulated by anticoagulation treatment    History of peptic ulcer disease    Hyperglycemia    Hypersomnia    Intractable vomiting with nausea    Lower leg DVT (deep venous thromboembolism), acute, right ([facility])    Lumbar spondylosis    Migraine aura, persistent    Mild episode of recurrent major depressive disorder ([facility])    Myofascial pain    Peripheral neuropathy    Recurrent falls    Right hip pain    Subclinical hyperthyroidism    Thoracic or lumbosacral neuritis or radiculitis    Weakness  "of both lower extremities    Postmenopausal    Obesity (BMI 30-39.9)    Preoperative clearance    S/P reverse total shoulder arthroplasty, right    Aftercare following right shoulder joint replacement surgery    IFG (impaired fasting glucose)    Other hyperlipidemia    Obesity, morbid (HCC)    Gastroesophageal reflux disease without esophagitis    RUQ discomfort       Past Medical History:   Diagnosis Date    Adrenal insufficiency (Powhatan's disease) ([facility])     Anemia 09 15 2022    Was found in bloodwork done on that day    Anxiety     Arthritis     Bilateral pulmonary contusion 07/03/2020    Bipolar disorder     Cervical radiculopathy     Chest pain     seen in ER 9/18, dx with gas    Chronic back pain     Chronic pain disorder     lumbar    Closed head injury with brief loss of consciousness ([facility]) 10/15/2019    Contusion of right hand 07/03/2021    Depression     DVT, lower extremity ([facility])     1991 and 2019 now on eliquis    Exercises 5 to 6 times per week     5 days per week with weights/band and also goes to PT 2x/week    Fall down stairs 07/03/2020    Fibromyalgia     Fibromyalgia, primary     GERD (gastroesophageal reflux disease)     Headache(784.0) 2018    I get migraines. See Neurologist for this.    Hematoma of parietal scalp 07/03/2020    History of Clostridioides difficile infection     History of MRSA infection     pt denies having this    History of recent fall 07/2021    \"possibly injured left shoulder'    History of TIAs     cannot remember details    Hypertension     Hypokalemia     Intractable migraine without aura and without status migrainosus 10/02/2019    Left shoulder pain     \"not too bad\" goes to PT 2x/week    Migraine     Obesity 2019    Psychiatric disorder     Anxiety, major depression, bipolar    Spinal stenosis     Syncope 2014    orthostatic hypotension    Wears dentures     upper    Wears glasses        Past Surgical History:   Procedure Laterality Date    APPENDECTOMY      CAST " APPLICATION Left 07/04/2020    Procedure: Application short-arm splint;  Surgeon: Sony Zhang MD;  [facility];  Service: Orthopedics    COLONOSCOPY      FL INJECTION LEFT SHOULDER (ARTHROGRAM)  11/10/2021    GASTRIC RESTRICTION SURGERY      Gastric Sleeve Nov 2015    HIP SURGERY  2016    Hip Replacement    HYSTERECTOMY      DONALD    JOINT REPLACEMENT      Left Knee 2011 and Right Hip 2010    KNEE SURGERY  2015    Knee Replacement    OOPHORECTOMY      ORIF WRIST FRACTURE Left 07/04/2020    Procedure: Open reduction and internal fixation left radius and ulnar shaft fracture;  Surgeon: Sony Zhang MD;  [facility];  Service: Orthopedics    OR ARTHROPLASTY GLENOHUMERAL JOINT TOTAL SHOULDER Left 03/30/2021    Procedure: ARTHROPLASTY SHOULDER - anatomic;  Surgeon: Daryn Daniel MD;  [facility];  Service: Orthopedics    OR ARTHROPLASTY GLENOHUMERAL JOINT TOTAL SHOULDER Right 10/24/2023    Procedure: ANATOMIC VS REVERSE TOTAL SHOULDER ARTHROPLASTY, WITH BICEPS TENDONESIS;  Surgeon: Daryn Daniel MD;  [facility];  Service: Orthopedics    OR NATIVIDAD SHOULDER ARTHRPLSTY HUMERAL&GLENOID COMPNT Left 12/21/2021    Procedure: REVISION OF TOTAL SHOULDER ARTHROPLASTY TO REVERSE TOTAL SHOULDER ARTHROPLASTY;  Surgeon: Daryn Daniel MD;  [facility];  Service: Orthopedics       Family History   Problem Relation Age of Onset    Breast cancer Mother 37    Migraines Mother     Arthritis Mother     Depression Mother     Cancer Mother     Anxiety disorder Mother     Kidney disease Father     Heart disease Father     Diabetes Father     Arthritis Father     Hypertension Father     Brain cancer Brother     Seizures Brother        Social History     Occupational History    Occupation: retired   Tobacco Use    Smoking status: Former     Current packs/day: 0.00     Average packs/day: 0.2 packs/day for 20.0 years (4.0 ttl pk-yrs)     Types: Cigarettes     Start  date: 1998     Quit date: 2008     Years since quittin.6    Smokeless tobacco: Never    Tobacco comments:     quit   Vaping Use    Vaping status: Never Used   Substance and Sexual Activity    Alcohol use: Not Currently     Alcohol/week: 2.0 standard drinks of alcohol     Types: 1 Glasses of wine, 1 Standard drinks or equivalent per week     Comment: occasionally    Drug use: Never     Comment: na    Sexual activity: Not Currently     Partners: Male     Birth control/protection: Abstinence     Comment: defer       Current Outpatient Medications on File Prior to Visit   Medication Sig    amLODIPine (NORVASC) 5 mg tablet Take 1 tablet (5 mg total) by mouth daily    apixaban (Eliquis) 5 mg Take 1 tablet (5 mg total) by mouth 2 (two) times a day    ARIPiprazole (ABILIFY) 5 mg tablet Take 1 tablet (5 mg total) by mouth daily    atorvastatin (LIPITOR) 40 mg tablet TAKE 1 TABLET BY MOUTH EVERY DAY AT NIGHT    cholecalciferol (VITAMIN D3) 1,000 units tablet     furosemide (LASIX) 20 mg tablet Take 20 mg by mouth as needed Pt reports calls her [facility] cardiologist Dr Michel before taking    gabapentin (NEURONTIN) 600 MG tablet Take 1 tablet (600 mg total) by mouth 3 (three) times a day    lamoTRIgine (LaMICtal) 200 MG tablet Take 200 mg by mouth daily.    LORazepam (ATIVAN) 0.5 mg tablet Take 1 tablet (0.5 mg total) by mouth daily as needed for anxiety    meclizine (ANTIVERT) 12.5 MG tablet Take 1 tablet (12.5 mg total) by mouth every 8 (eight) hours as needed for dizziness    methocarbamol (ROBAXIN) 750 mg tablet Take 1 tablet (750 mg total) by mouth every 8 (eight) hours as needed for muscle spasms    omeprazole (PriLOSEC) 40 MG capsule Take 1 capsule (40 mg total) by mouth daily    ondansetron (ZOFRAN-ODT) 4 mg disintegrating tablet TAKE 1 TABLET BY MOUTH EVERY 8 HOURS AS NEEDED FOR NAUSEA AND VOMITING    potassium chloride (K-DUR,KLOR-CON) 20 mEq tablet Take 20 mEq by mouth daily      topiramate (TOPAMAX) 25 mg  "tablet TAKE 1 TABLET BY MOUTH TWICE A DAY (Patient not taking: Reported on 9/6/2024)     No current facility-administered medications on file prior to visit.       Allergies   Allergen Reactions    Ketorolac Itching and Other (See Comments)     [toradol] Itching, hives    Mushroom Extract Complex - Food Allergy Hives    Oxycodone Other (See Comments)     Irritable ,severe mood change        Physical Exam:    /84   Pulse 88   Ht 5' 8\" (1.727 m)   BMI 36.43 kg/m²     Constitutional:normal, well developed, well nourished, alert, in no distress and non-toxic and no overt pain behavior.  Eyes:anicteric  HEENT:grossly intact  Neck:supple, symmetric, trachea midline and no masses   Pulmonary:even and unlabored  Cardiovascular:No edema or pitting edema present  Skin:Normal without rashes or lesions and well hydrated  Psychiatric:Mood and affect appropriate  Neurologic:Cranial Nerves II-XII grossly intact  Musculoskeletal:normal, except for tenderness to palpation over the bilateral sacroiliac joints.      Special Tests:  Left Kee's Maneuver:  positive  Right Kee's Maneuver:  positive  Left Gaenslen's Test:  positive  Right Gaenslen's Test:  positive  Left Pelvic Distraction Test:  positive  Right Pelvic Distraction Test:  positive  + Debra finger sign bilaterally    "

## 2024-09-20 ENCOUNTER — APPOINTMENT (OUTPATIENT)
Dept: LAB | Facility: MEDICAL CENTER | Age: 64
End: 2024-09-20
Payer: COMMERCIAL

## 2024-09-20 DIAGNOSIS — D50.8 IRON DEFICIENCY ANEMIA FOLLOWING BARIATRIC SURGERY: ICD-10-CM

## 2024-09-20 DIAGNOSIS — K95.89 IRON DEFICIENCY ANEMIA FOLLOWING BARIATRIC SURGERY: ICD-10-CM

## 2024-09-20 DIAGNOSIS — E53.8 B12 DEFICIENCY: ICD-10-CM

## 2024-09-20 LAB
ALBUMIN SERPL BCG-MCNC: 3.9 G/DL (ref 3.5–5)
ALP SERPL-CCNC: 186 U/L (ref 34–104)
ALT SERPL W P-5'-P-CCNC: 16 U/L (ref 7–52)
ANION GAP SERPL CALCULATED.3IONS-SCNC: 9 MMOL/L (ref 4–13)
AST SERPL W P-5'-P-CCNC: 21 U/L (ref 13–39)
BASOPHILS # BLD AUTO: 0.06 THOUSANDS/ΜL (ref 0–0.1)
BASOPHILS NFR BLD AUTO: 1 % (ref 0–1)
BILIRUB SERPL-MCNC: 0.44 MG/DL (ref 0.2–1)
BUN SERPL-MCNC: 13 MG/DL (ref 5–25)
CALCIUM SERPL-MCNC: 8.8 MG/DL (ref 8.4–10.2)
CHLORIDE SERPL-SCNC: 101 MMOL/L (ref 96–108)
CO2 SERPL-SCNC: 26 MMOL/L (ref 21–32)
CREAT SERPL-MCNC: 0.87 MG/DL (ref 0.6–1.3)
EOSINOPHIL # BLD AUTO: 0.19 THOUSAND/ΜL (ref 0–0.61)
EOSINOPHIL NFR BLD AUTO: 2 % (ref 0–6)
ERYTHROCYTE [DISTWIDTH] IN BLOOD BY AUTOMATED COUNT: 14.6 % (ref 11.6–15.1)
FERRITIN SERPL-MCNC: 17 NG/ML (ref 11–307)
FOLATE SERPL-MCNC: 7.4 NG/ML
GFR SERPL CREATININE-BSD FRML MDRD: 70 ML/MIN/1.73SQ M
GLUCOSE SERPL-MCNC: 97 MG/DL (ref 65–140)
HCT VFR BLD AUTO: 38.1 % (ref 34.8–46.1)
HGB BLD-MCNC: 12.2 G/DL (ref 11.5–15.4)
IMM GRANULOCYTES # BLD AUTO: 0.04 THOUSAND/UL (ref 0–0.2)
IMM GRANULOCYTES NFR BLD AUTO: 0 % (ref 0–2)
IRON SATN MFR SERPL: 24 % (ref 15–50)
IRON SERPL-MCNC: 92 UG/DL (ref 50–212)
LYMPHOCYTES # BLD AUTO: 2.28 THOUSANDS/ΜL (ref 0.6–4.47)
LYMPHOCYTES NFR BLD AUTO: 23 % (ref 14–44)
MCH RBC QN AUTO: 29.8 PG (ref 26.8–34.3)
MCHC RBC AUTO-ENTMCNC: 32 G/DL (ref 31.4–37.4)
MCV RBC AUTO: 93 FL (ref 82–98)
MONOCYTES # BLD AUTO: 1.09 THOUSAND/ΜL (ref 0.17–1.22)
MONOCYTES NFR BLD AUTO: 11 % (ref 4–12)
NEUTROPHILS # BLD AUTO: 6.45 THOUSANDS/ΜL (ref 1.85–7.62)
NEUTS SEG NFR BLD AUTO: 63 % (ref 43–75)
NRBC BLD AUTO-RTO: 0 /100 WBCS
PLATELET # BLD AUTO: 419 THOUSANDS/UL (ref 149–390)
PMV BLD AUTO: 9.7 FL (ref 8.9–12.7)
POTASSIUM SERPL-SCNC: 3.8 MMOL/L (ref 3.5–5.3)
PROT SERPL-MCNC: 6.9 G/DL (ref 6.4–8.4)
RBC # BLD AUTO: 4.1 MILLION/UL (ref 3.81–5.12)
SODIUM SERPL-SCNC: 136 MMOL/L (ref 135–147)
TIBC SERPL-MCNC: 389 UG/DL (ref 250–450)
UIBC SERPL-MCNC: 297 UG/DL (ref 155–355)
VIT B12 SERPL-MCNC: 261 PG/ML (ref 180–914)
WBC # BLD AUTO: 10.11 THOUSAND/UL (ref 4.31–10.16)

## 2024-09-20 PROCEDURE — 85025 COMPLETE CBC W/AUTO DIFF WBC: CPT

## 2024-09-20 PROCEDURE — 80053 COMPREHEN METABOLIC PANEL: CPT

## 2024-09-20 PROCEDURE — 83918 ORGANIC ACIDS TOTAL QUANT: CPT

## 2024-09-20 PROCEDURE — 83540 ASSAY OF IRON: CPT

## 2024-09-20 PROCEDURE — 83550 IRON BINDING TEST: CPT

## 2024-09-20 PROCEDURE — 82746 ASSAY OF FOLIC ACID SERUM: CPT

## 2024-09-20 PROCEDURE — 82607 VITAMIN B-12: CPT

## 2024-09-20 PROCEDURE — 82728 ASSAY OF FERRITIN: CPT

## 2024-09-20 PROCEDURE — 36415 COLL VENOUS BLD VENIPUNCTURE: CPT

## 2024-09-24 LAB — METHYLMALONATE SERPL-SCNC: 563 NMOL/L (ref 0–378)

## 2024-09-25 ENCOUNTER — HOSPITAL ENCOUNTER (OUTPATIENT)
Dept: RADIOLOGY | Facility: MEDICAL CENTER | Age: 64
Discharge: HOME | End: 2024-09-25
Payer: COMMERCIAL

## 2024-09-25 VITALS
DIASTOLIC BLOOD PRESSURE: 77 MMHG | HEART RATE: 80 BPM | OXYGEN SATURATION: 99 % | SYSTOLIC BLOOD PRESSURE: 141 MMHG | TEMPERATURE: 97.2 F | RESPIRATION RATE: 18 BRPM

## 2024-09-25 DIAGNOSIS — M46.1 SACROILIITIS (HCC): ICD-10-CM

## 2024-09-25 PROCEDURE — 27096 INJECT SACROILIAC JOINT: CPT | Performed by: PHYSICAL MEDICINE & REHABILITATION

## 2024-09-25 RX ORDER — METHYLPREDNISOLONE ACETATE 40 MG/ML
80 INJECTION, SUSPENSION INTRA-ARTICULAR; INTRALESIONAL; INTRAMUSCULAR; PARENTERAL; SOFT TISSUE ONCE
Status: COMPLETED | OUTPATIENT
Start: 2024-09-25 | End: 2024-09-25

## 2024-09-25 RX ORDER — ROPIVACAINE HYDROCHLORIDE 2 MG/ML
3 INJECTION, SOLUTION EPIDURAL; INFILTRATION; PERINEURAL ONCE
Status: COMPLETED | OUTPATIENT
Start: 2024-09-25 | End: 2024-09-25

## 2024-09-25 RX ADMIN — ROPIVACAINE HYDROCHLORIDE 3 ML: 2 INJECTION, SOLUTION EPIDURAL; INFILTRATION; PERINEURAL at 13:15

## 2024-09-25 RX ADMIN — METHYLPREDNISOLONE ACETATE 80 MG: 40 INJECTION, SUSPENSION INTRA-ARTICULAR; INTRALESIONAL; INTRAMUSCULAR; SOFT TISSUE at 13:15

## 2024-09-25 RX ADMIN — IOHEXOL 1 ML: 300 INJECTION, SOLUTION INTRAVENOUS at 13:15

## 2024-09-25 NOTE — DISCHARGE INSTRUCTIONS

## 2024-09-25 NOTE — H&P
"History of Present Illness: The patient is a 63 y.o. female who presents with complaints of bilateral lower back pain    Past Medical History:   Diagnosis Date    Adrenal insufficiency (Andrés's disease) ([facility])     Anemia 09 15 2022    Was found in bloodwork done on that day    Anxiety     Arthritis     Bilateral pulmonary contusion 07/03/2020    Bipolar disorder     Cervical radiculopathy     Chest pain     seen in ER 9/18, dx with gas    Chronic back pain     Chronic pain disorder     lumbar    Closed head injury with brief loss of consciousness ([facility]) 10/15/2019    Contusion of right hand 07/03/2021    Depression     DVT, lower extremity ([facility])     1991 and 2019 now on eliquis    Exercises 5 to 6 times per week     5 days per week with weights/band and also goes to PT 2x/week    Fall down stairs 07/03/2020    Fibromyalgia     Fibromyalgia, primary     GERD (gastroesophageal reflux disease)     Headache(784.0) 2018    I get migraines. See Neurologist for this.    Hematoma of parietal scalp 07/03/2020    History of Clostridioides difficile infection     History of MRSA infection     pt denies having this    History of recent fall 07/2021    \"possibly injured left shoulder'    History of TIAs     cannot remember details    Hypertension     Hypokalemia     Intractable migraine without aura and without status migrainosus 10/02/2019    Left shoulder pain     \"not too bad\" goes to PT 2x/week    Migraine     Obesity 2019    Psychiatric disorder     Anxiety, major depression, bipolar    Spinal stenosis     Syncope 2014    orthostatic hypotension    Wears dentures     upper    Wears glasses        Past Surgical History:   Procedure Laterality Date    APPENDECTOMY      CAST APPLICATION Left 07/04/2020    Procedure: Application short-arm splint;  Surgeon: Sony Zhang MD;  [facility];  Service: Orthopedics    COLONOSCOPY      FL INJECTION LEFT SHOULDER (ARTHROGRAM)  11/10/2021    GASTRIC RESTRICTION SURGERY   "    Gastric Sleeve Nov 2015    HIP SURGERY  2016    Hip Replacement    HYSTERECTOMY      DONALD    JOINT REPLACEMENT      Left Knee 2011 and Right Hip 2010    KNEE SURGERY  2015    Knee Replacement    OOPHORECTOMY      ORIF WRIST FRACTURE Left 07/04/2020    Procedure: Open reduction and internal fixation left radius and ulnar shaft fracture;  Surgeon: Sony Zhang MD;  [facility];  Service: Orthopedics    CA ARTHROPLASTY GLENOHUMERAL JOINT TOTAL SHOULDER Left 03/30/2021    Procedure: ARTHROPLASTY SHOULDER - anatomic;  Surgeon: Daryn Daniel MD;  [facility];  Service: Orthopedics    CA ARTHROPLASTY GLENOHUMERAL JOINT TOTAL SHOULDER Right 10/24/2023    Procedure: ANATOMIC VS REVERSE TOTAL SHOULDER ARTHROPLASTY, WITH BICEPS TENDONESIS;  Surgeon: Daryn Daniel MD;  [facility];  Service: Orthopedics    CA NATIVIDAD SHOULDER ARTHRPLSTY HUMERAL&GLENOID COMPNT Left 12/21/2021    Procedure: REVISION OF TOTAL SHOULDER ARTHROPLASTY TO REVERSE TOTAL SHOULDER ARTHROPLASTY;  Surgeon: Daryn Daniel MD;  [facility];  Service: Orthopedics         Current Outpatient Medications:     amLODIPine (NORVASC) 5 mg tablet, Take 1 tablet (5 mg total) by mouth daily, Disp: 90 tablet, Rfl: 1    apixaban (Eliquis) 5 mg, Take 1 tablet (5 mg total) by mouth 2 (two) times a day, Disp: 180 tablet, Rfl: 3    ARIPiprazole (ABILIFY) 5 mg tablet, Take 1 tablet (5 mg total) by mouth daily, Disp: 90 tablet, Rfl: 1    atorvastatin (LIPITOR) 40 mg tablet, TAKE 1 TABLET BY MOUTH EVERY DAY AT NIGHT, Disp: , Rfl:     cholecalciferol (VITAMIN D3) 1,000 units tablet, , Disp: , Rfl:     furosemide (LASIX) 20 mg tablet, Take 20 mg by mouth as needed Pt reports calls her [facility] cardiologist Dr Michel before taking, Disp: , Rfl:     gabapentin (NEURONTIN) 600 MG tablet, Take 1 tablet (600 mg total) by mouth 3 (three) times a day, Disp: 270 tablet, Rfl: 1    lamoTRIgine (LaMICtal) 200 MG tablet, Take 200 mg  by mouth daily., Disp: , Rfl:     LORazepam (ATIVAN) 0.5 mg tablet, Take 1 tablet (0.5 mg total) by mouth daily as needed for anxiety, Disp: 15 tablet, Rfl: 0    meclizine (ANTIVERT) 12.5 MG tablet, Take 1 tablet (12.5 mg total) by mouth every 8 (eight) hours as needed for dizziness, Disp: 30 tablet, Rfl: 1    methocarbamol (ROBAXIN) 750 mg tablet, Take 1 tablet (750 mg total) by mouth every 8 (eight) hours as needed for muscle spasms, Disp: 30 tablet, Rfl: 0    omeprazole (PriLOSEC) 40 MG capsule, Take 1 capsule (40 mg total) by mouth daily, Disp: 90 capsule, Rfl: 1    ondansetron (ZOFRAN-ODT) 4 mg disintegrating tablet, TAKE 1 TABLET BY MOUTH EVERY 8 HOURS AS NEEDED FOR NAUSEA AND VOMITING, Disp: 90 tablet, Rfl: 0    potassium chloride (K-DUR,KLOR-CON) 20 mEq tablet, Take 20 mEq by mouth daily  , Disp: , Rfl:     topiramate (TOPAMAX) 25 mg tablet, TAKE 1 TABLET BY MOUTH TWICE A DAY (Patient not taking: Reported on 9/6/2024), Disp: 180 tablet, Rfl: 1    Allergies   Allergen Reactions    Ketorolac Itching and Other (See Comments)     [toradol] Itching, hives    Mushroom Extract Complex - Food Allergy Hives    Oxycodone Other (See Comments)     Irritable ,severe mood change        Physical Exam:   Vitals:    09/25/24 1301   BP: 133/77   Pulse: 83   Resp: 18   Temp: (!) 97.2 °F (36.2 °C)   SpO2: 97%     General: Awake, Alert, Oriented x 3, Mood and affect appropriate  Respiratory: Respirations even and unlabored  Cardiovascular: Peripheral pulses intact; no edema  Musculoskeletal Exam: bilateral lower back pain    ASA Score: 3    Patient/Chart Verification  Patient ID Verified: Verbal  ID Band Applied: No  Consents Confirmed: Procedural, To be obtained in the Pre-Procedure area  H&P( within 30 days) Verified: To be obtained in the Procedural area  Interval H&P(within 24 hr) Complete (required for Outpatients and Surgery Admit only): To be obtained in the Procedural area  Allergies Reviewed: Yes  Anticoag/NSAID held?:  NA  Currently on antibiotics?: No  Pregnancy denied?: NA    Assessment:   1. Sacroiliitis (HCC)        Plan: Bilateral SIJ injections

## 2024-09-26 ENCOUNTER — TELEPHONE (OUTPATIENT)
Dept: HEMATOLOGY ONCOLOGY | Facility: CLINIC | Age: 64
End: 2024-09-26

## 2024-09-26 ENCOUNTER — OFFICE VISIT (OUTPATIENT)
Dept: HEMATOLOGY ONCOLOGY | Facility: CLINIC | Age: 64
End: 2024-09-26
Payer: COMMERCIAL

## 2024-09-26 VITALS
DIASTOLIC BLOOD PRESSURE: 82 MMHG | BODY MASS INDEX: 36.68 KG/M2 | SYSTOLIC BLOOD PRESSURE: 124 MMHG | RESPIRATION RATE: 18 BRPM | OXYGEN SATURATION: 98 % | HEART RATE: 89 BPM | TEMPERATURE: 96.9 F | HEIGHT: 68 IN | WEIGHT: 242 LBS

## 2024-09-26 DIAGNOSIS — E53.8 FOLATE DEFICIENCY: ICD-10-CM

## 2024-09-26 DIAGNOSIS — D75.838 SECONDARY THROMBOCYTOSIS: ICD-10-CM

## 2024-09-26 DIAGNOSIS — D50.8 IRON DEFICIENCY ANEMIA FOLLOWING BARIATRIC SURGERY: Primary | ICD-10-CM

## 2024-09-26 DIAGNOSIS — K95.89 IRON DEFICIENCY ANEMIA FOLLOWING BARIATRIC SURGERY: Primary | ICD-10-CM

## 2024-09-26 DIAGNOSIS — D53.1 OTHER MEGALOBLASTIC ANEMIAS, NOT ELSEWHERE CLASSIFIED: ICD-10-CM

## 2024-09-26 DIAGNOSIS — E53.8 B12 DEFICIENCY: ICD-10-CM

## 2024-09-26 DIAGNOSIS — R74.8 ALKALINE PHOSPHATASE RAISED: ICD-10-CM

## 2024-09-26 PROCEDURE — G2211 COMPLEX E/M VISIT ADD ON: HCPCS | Performed by: PHYSICIAN ASSISTANT

## 2024-09-26 PROCEDURE — 99215 OFFICE O/P EST HI 40 MIN: CPT | Performed by: PHYSICIAN ASSISTANT

## 2024-09-26 RX ORDER — CYANOCOBALAMIN 1000 UG/ML
1000 INJECTION, SOLUTION INTRAMUSCULAR; SUBCUTANEOUS ONCE
OUTPATIENT
Start: 2024-10-10 | End: 2024-10-10

## 2024-09-26 RX ORDER — DIPHENHYDRAMINE HCL 12.5 MG/5ML
25 SOLUTION ORAL ONCE
Start: 2024-10-10 | End: 2024-10-10

## 2024-09-26 RX ORDER — SODIUM CHLORIDE 9 MG/ML
20 INJECTION, SOLUTION INTRAVENOUS ONCE
OUTPATIENT
Start: 2024-10-10

## 2024-09-26 RX ORDER — FOLIC ACID 1 MG/1
1 TABLET ORAL DAILY
Qty: 90 TABLET | Refills: 1 | Status: SHIPPED | OUTPATIENT
Start: 2024-09-26

## 2024-09-26 NOTE — TELEPHONE ENCOUNTER
LMOVM for patient to call us back to schedule B12 injection and iron infusion. Call back number given.

## 2024-09-26 NOTE — TELEPHONE ENCOUNTER
Good morning,                          Please schedule patient for infusions per provider.      Thank you

## 2024-09-26 NOTE — PATIENT INSTRUCTIONS
[facility] Medical Oncology and Hematology Team  [facility] - [phone]    Your Team Member:  Advanced Practitioner:  Saloni Ralph PA-C    Please answer Private and Unavailable Calls - this may be your team(s) contacting you.  If you have medical questions/concerns/issues - contact us either by (1) My Chart (2) [facility]     B-12 extra-strength gummy vitamin 3000 mcg (2 gummies) daily x 2 months then 1500 mcg (1 gummy) daily.

## 2024-09-26 NOTE — PROGRESS NOTES
[address]  [facility] HEMATOLOGY ON[facility] [place]  [address]  [facility] [id number]  Hematology Ambulatory Follow-Up  Li Torre, 1960, [id number]  9/26/2024    JENNIFER: MOON BLANCO  Assessment and Plan   1. Iron deficiency anemia following bariatric surgery; 2.  Secondary thrombocytosis  Component      Latest Ref Rng 8/18/2023 3/8/2024 9/20/2024   FERRITIN      11 - 307 ng/mL 56  35  17      This is a 63-year-old female with past medical history of bariatric surgery, gastric sleeve who has had significant issues with iron deficiency requiring IV iron supplementation.  Patient was last treated in October 2022 with excellent results.  More recently, patient has been feeling more tired and fatigued.  Ferritin now down to 17 and platelet count is elevated due to increased demand of red blood cell turnover.    Patient has had IV iron in the past with side effects.  Patient was treated last with 200 mg of Venofer weekly with premedications that included Decadron 8 mg, IV Benadryl 25 mg and Pepcid 20 mg.  Tolerated this treatment well.  We discussed repeating this.  Recommended weekly for 5 weeks.    Etiology:  gastric malabsorption due to previous bariatric surgery consistent with last treatment approximately 2 years ago.  Recent CT of the abdomen pelvis and chest was negative for abdominal masses.  Colonoscopy completed in June 2023 with follow-up recommended in 10 years.      - CBC and differential; Future  - Iron Panel (Includes Ferritin, Iron Sat%, Iron, and TIBC); Future  - Vitamin B12; Future  - Folate; Future    3. B12 deficiency;  4. Folate deficiency; 5. Other megaloblastic anemias, not elsewhere classified  B12 deficiency and folic acid deficiency consistent with previous history of bariatric surgery.    Recommendation:  B-12 extra-strength gummy vitamin 3000 mcg (2 gummies) daily x 2 months then 1500 mcg (1 gummy) daily.  +  Folic acid 1 mg p.o. daily    - Vitamin B12; Future  -  Folate; Future  - folic acid ( Folic Acid) 1 mg tablet; Take 1 tablet (1 mg total) by mouth daily  Dispense: 90 tablet; Refill: 1    6. Alkaline phosphatase raised  Unlikely to be related to anemia above however, it has been chronically borderline elevated.  It is less than 2 times the upper limit of normal, recommendation to undergo additional testing to determine if from the liver or the bone.  Patient recommended to follow-up with PCP for further assessment.    - Alkaline phosphatase, isoenzymes; Future  - Gamma GT; Future  - Comprehensive metabolic panel; Future      The patient is scheduled for follow-up in approximately 4 months.     Patient voiced agreement and understanding to the above.   Patient advised to call the Hematology/Oncology office with any questions and concerns regarding the above.    I have spent a total time of 47 minutes in caring for this patient on the day of the visit/encounter including Diagnostic results, Risks and benefits of tx options, Instructions for management, Patient and family education, Documenting in the medical record, Reviewing / ordering tests, medicine, procedures  , and Obtaining or reviewing history  .  Barrier(s) to care: None  The patient is able to self care.    Saloni Ralph PA-C  Medical Oncology/Hematology  [facility]    Subjective     Chief Complaint   Patient presents with    Follow-up     History of present illness:   This is a 63-year-old female with past medical history of adrenal insufficiency bipolar disorder, chronic back pain, fibromyalgia, TIAs, migraine syndrome, iron deficiency, B12 and folic acid deficiency originally seen in consultation through [facility] in October 2022 transitioning care from [facility] for infusions.  Patient underwent bariatric gastric sleeve surgery in 2018.  Patient could not tolerate oral iron secondary to postsurgical malabsorption and constipation.    11/30/2018:  Hemoglobin 11.3,  MCV 79, platelets 297, WBC 8.7  04/11/2019:  Hemoglobin 8.7, MCV 82, platelets 293, WBC 9.13              Ferritin 12, serum iron 27, TIBC 396  12/06/2021:  Hemoglobin 10.9, MCV 87, platelets 446, WBC 12.71              Ferritin 15, serum iron 30, TIBC 501  09/15/2022:  Hemoglobin 12.1, MCV 82, platelets 502, WBC 18.26              Ferritin 9, serum iron 36, TIBC 544  10/11/2022: Hemoglobin 11, MCV 84, platelets 484, WBC 10.03              Ferritin 10, iron saturation 5%, TIBC 495, serum iron 27              B12 320, , folate 15.1  Venofer 200 mg x6 (pepcid, benadryl, and decadron): 10/21-11/25 2/17/2023: Hemoglobin 13, MCV 91, platelets 434, WBC 14.45              Ferritin 155, iron saturation 31%, TIBC 4 1, serum iron 126              B12 624, MMA pending  8/18/2023: Hemoglobin 13.6, MCV 99 platelets 4 1, WBC 10.23              Ferritin 56, iron saturation 30%, TIBC 373, serum iron 111              B12 779, folate 9  3/8/2024: Hemoglobin 13.6, MCV 89, platelets 352, WBC 8.82              Ferritin 35, iron saturation 25%, TIBC 405, serum iron 101              B12 460, folate 5.9  9/20/2024 hemoglobin 12.2, MCV 93, WBC 10.11, platelet count 419   Ferritin 17, iron saturation 24   B12 261, MMA high greater than 500, folate 7.9    Last DEXA: Not on file   Last Mammo : 06/13/2023  Last Colonscopy: 07/18/2022 - Follow up 10 years.   Last Pap:  Not on file    09/26/24 :  Lab Results   Component Value Date    IRON 92 09/20/2024    TIBC 389 09/20/2024    FERRITIN 17 09/20/2024     Lab Results   Component Value Date    WBC 10.11 09/20/2024    HGB 12.2 09/20/2024    HCT 38.1 09/20/2024    MCV 93 09/20/2024     (H) 09/20/2024     Interval history: Very unmotivated to do physical labor activity around the house.  Patient's  notes a definitive change in the way that she interacts.  Patient has been getting up doing 1 task and then having to sit back down, different from several months ago.  Positive sleep  disturbances.  Positive migraines.    No blood in the stool, hematic emesis, hematochezia, vaginal bleeding, blood in the urine  Review of Systems   Constitutional:  Positive for fatigue. Negative for appetite change, fever and unexpected weight change.   HENT:  Negative for nosebleeds.    Respiratory:  Negative for cough, choking and shortness of breath.         Negative hemoptysis.   Cardiovascular:  Negative for chest pain, palpitations and leg swelling.   Gastrointestinal: Negative.  Negative for abdominal distention, abdominal pain, anal bleeding, blood in stool, constipation, diarrhea, nausea and vomiting.   Endocrine: Negative.  Negative for cold intolerance.   Genitourinary: Negative.  Negative for hematuria, menstrual problem, vaginal bleeding, vaginal discharge and vaginal pain.   Musculoskeletal: Negative.  Negative for arthralgias, myalgias, neck pain and neck stiffness.   Skin: Negative.  Negative for color change, pallor and rash.   Allergic/Immunologic: Negative.  Negative for immunocompromised state.   Neurological: Negative.  Negative for weakness and headaches.   Hematological:  Negative for adenopathy. Does not bruise/bleed easily.   All other systems reviewed and are negative.    Patient Active Problem List   Diagnosis    Bipolar depression ([facility] [place] [facility])    Hypokalemia    Adrenal insufficiency (Andrés's disease) ([facility] [place] [facility])    Vertigo    Fibromyalgia    Osteoarthritis of lumbosacral spine without myelopathy    HLD (hyperlipidemia)    Orthostatic hypotension    History of TIAs    History of migraine    Neck pain    Low back pain    PE (pulmonary thromboembolism) ([facility] [place] [facility])    Hypertension    Bipolar II disorder ([facility] [place] [facility])    Chronic back pain    Anxiety    Leukocytosis    Chronic anticoagulation    Anemia    Ambulatory dysfunction    Edema    Closed fracture of distal ends of left radius and ulna    Fracture of multiple ribs of right side    History of anxiety    History of pulmonary embolism    Right distal ulnar  fracture    Primary osteoarthritis of left shoulder    Status post total replacement of left shoulder    Alkalosis    Primary osteoarthritis of right shoulder    S/P reverse total shoulder arthroplasty, left    Iron deficiency anemia following bariatric surgery    Vitamin B 12 deficiency    Bilateral occipital neuralgia    Closed fracture of styloid process of radius    Fatigue    Anticoagulated by anticoagulation treatment    History of peptic ulcer disease    Hyperglycemia    Hypersomnia    Intractable vomiting with nausea    Lower leg DVT (deep venous thromboembolism), acute, right ([facility])    Lumbar spondylosis    Migraine aura, persistent    Mild episode of recurrent major depressive disorder ([facility])    Myofascial pain    Peripheral neuropathy    Recurrent falls    Right hip pain    Subclinical hyperthyroidism    Thoracic or lumbosacral neuritis or radiculitis    Weakness of both lower extremities    Postmenopausal    Obesity (BMI 30-39.9)    Preoperative clearance    S/P reverse total shoulder arthroplasty, right    Aftercare following right shoulder joint replacement surgery    IFG (impaired fasting glucose)    Other hyperlipidemia    Obesity, morbid ([facility])    Gastroesophageal reflux disease without esophagitis    RUQ discomfort    Sacroiliitis ([facility])     Past Medical History:   Diagnosis Date    Adrenal insufficiency (Andrés's disease) ([facility])     Anemia 09 15 2022    Was found in bloodwork done on that day    Anxiety     Arthritis     Bilateral pulmonary contusion 07/03/2020    Bipolar disorder     Cervical radiculopathy     Chest pain     seen in ER 9/18, dx with gas    Chronic back pain     Chronic pain disorder     lumbar    Closed head injury with brief loss of consciousness ([facility]) 10/15/2019    Contusion of right hand 07/03/2021    Depression     DVT, lower extremity ([facility])     1991 and 2019 now on eliquis    Exercises 5 to 6 times per week     5 days per week with weights/band and also goes to PT 2x/week     "Fall down stairs 07/03/2020    Fibromyalgia     Fibromyalgia, primary     GERD (gastroesophageal reflux disease)     Headache(784.0) 2018    I get migraines. See Neurologist for this.    Hematoma of parietal scalp 07/03/2020    History of Clostridioides difficile infection     History of MRSA infection     pt denies having this    History of recent fall 07/2021    \"possibly injured left shoulder'    History of TIAs     cannot remember details    Hypertension     Hypokalemia     Intractable migraine without aura and without status migrainosus 10/02/2019    Left shoulder pain     \"not too bad\" goes to PT 2x/week    Migraine     Obesity 2019    Psychiatric disorder     Anxiety, major depression, bipolar    Spinal stenosis     Syncope 2014    orthostatic hypotension    Wears dentures     upper    Wears glasses      Past Surgical History:   Procedure Laterality Date    APPENDECTOMY      CAST APPLICATION Left 07/04/2020    Procedure: Application short-arm splint;  Surgeon: Sony Zhang MD;  Location: [facility];  Service: Orthopedics    COLONOSCOPY      FL INJECTION LEFT SHOULDER (ARTHROGRAM)  11/10/2021    GASTRIC RESTRICTION SURGERY      Gastric Sleeve Nov 2015    HIP SURGERY  2016    Hip Replacement    HYSTERECTOMY      DONALD    JOINT REPLACEMENT      Left Knee 2011 and Right Hip 2010    KNEE SURGERY  2015    Knee Replacement    OOPHORECTOMY      ORIF WRIST FRACTURE Left 07/04/2020    Procedure: Open reduction and internal fixation left radius and ulnar shaft fracture;  Surgeon: Sony Zhang MD;  Location: [facility];  Service: Orthopedics    AZ ARTHROPLASTY GLENOHUMERAL JOINT TOTAL SHOULDER Left 03/30/2021    Procedure: ARTHROPLASTY SHOULDER - anatomic;  Surgeon: Daryn Daniel MD;  Location: [facility];  Service: Orthopedics    AZ ARTHROPLASTY GLENOHUMERAL JOINT TOTAL SHOULDER Right 10/24/2023    Procedure: ANATOMIC VS REVERSE TOTAL SHOULDER ARTHROPLASTY, WITH BICEPS TENDONESIS;  Surgeon: Daryn Pal " MD María;  Location: [facility];  Service: Orthopedics    CT NATIVIDAD SHOULDER ARTHRPLSTY HUMERAL&GLENOID COMPNT Left 2021    Procedure: REVISION OF TOTAL SHOULDER ARTHROPLASTY TO REVERSE TOTAL SHOULDER ARTHROPLASTY;  Surgeon: Daryn Daniel MD;  Location: [facility];  Service: Orthopedics     Family History   Problem Relation Age of Onset    Breast cancer Mother 37    Migraines Mother     Arthritis Mother     Depression Mother     Cancer Mother     Anxiety disorder Mother     Kidney disease Father     Heart disease Father     Diabetes Father     Arthritis Father     Hypertension Father     Brain cancer Brother     Seizures Brother      Social History     Socioeconomic History    Marital status: /Civil Union     Spouse name: Not on file    Number of children: Not on file    Years of education: Not on file    Highest education level: Not on file   Occupational History    Occupation: retired   Tobacco Use    Smoking status: Former     Current packs/day: 0.00     Average packs/day: 0.2 packs/day for 20.0 years (4.0 ttl pk-yrs)     Types: Cigarettes     Start date: 1998     Quit date: 2008     Years since quittin.7    Smokeless tobacco: Never    Tobacco comments:     quit   Vaping Use    Vaping status: Never Used   Substance and Sexual Activity    Alcohol use: Not Currently     Alcohol/week: 2.0 standard drinks of alcohol     Types: 1 Glasses of wine, 1 Standard drinks or equivalent per week     Comment: occasionally    Drug use: Never     Comment: na    Sexual activity: Not Currently     Partners: Male     Birth control/protection: Abstinence     Comment: defer   Other Topics Concern    Not on file   Social History Narrative    Not on file     Social Determinants of Health     Financial Resource Strain: Not on file   Food Insecurity: No Food Insecurity (2024)    Hunger Vital Sign     Worried About Running Out of Food in the Last Year: Never true     Ran Out of Food in the Last  Year: Never true   Transportation Needs: No Transportation Needs (5/17/2024)    PRAPARE - Transportation     Lack of Transportation (Medical): No     Lack of Transportation (Non-Medical): No   Physical Activity: Not on file   Stress: Not on file   Social Connections: Not on file   Intimate Partner Violence: Not on file   Housing Stability: Low Risk  (5/17/2024)    Housing Stability Vital Sign     Unable to Pay for Housing in the Last Year: No     Number of Times Moved in the Last Year: 1     Homeless in the Last Year: No       Current Outpatient Medications:     amLODIPine (NORVASC) 5 mg tablet, Take 1 tablet (5 mg total) by mouth daily, Disp: 90 tablet, Rfl: 1    apixaban (Eliquis) 5 mg, Take 1 tablet (5 mg total) by mouth 2 (two) times a day, Disp: 180 tablet, Rfl: 3    ARIPiprazole (ABILIFY) 5 mg tablet, Take 1 tablet (5 mg total) by mouth daily, Disp: 90 tablet, Rfl: 1    atorvastatin (LIPITOR) 40 mg tablet, TAKE 1 TABLET BY MOUTH EVERY DAY AT NIGHT, Disp: , Rfl:     cholecalciferol (VITAMIN D3) 1,000 units tablet, , Disp: , Rfl:     folic acid (KP Folic Acid) 1 mg tablet, Take 1 tablet (1 mg total) by mouth daily, Disp: 90 tablet, Rfl: 1    furosemide (LASIX) 20 mg tablet, Take 20 mg by mouth as needed Pt reports calls her [facility] cardiologist Dr Michel before taking, Disp: , Rfl:     gabapentin (NEURONTIN) 600 MG tablet, Take 1 tablet (600 mg total) by mouth 3 (three) times a day, Disp: 270 tablet, Rfl: 1    lamoTRIgine (LaMICtal) 200 MG tablet, Take 200 mg by mouth daily., Disp: , Rfl:     LORazepam (ATIVAN) 0.5 mg tablet, Take 1 tablet (0.5 mg total) by mouth daily as needed for anxiety, Disp: 15 tablet, Rfl: 0    meclizine (ANTIVERT) 12.5 MG tablet, Take 1 tablet (12.5 mg total) by mouth every 8 (eight) hours as needed for dizziness, Disp: 30 tablet, Rfl: 1    methocarbamol (ROBAXIN) 750 mg tablet, Take 1 tablet (750 mg total) by mouth every 8 (eight) hours as needed for muscle spasms, Disp: 30 tablet, Rfl: 0     "omeprazole (PriLOSEC) 40 MG capsule, Take 1 capsule (40 mg total) by mouth daily, Disp: 90 capsule, Rfl: 1    ondansetron (ZOFRAN-ODT) 4 mg disintegrating tablet, TAKE 1 TABLET BY MOUTH EVERY 8 HOURS AS NEEDED FOR NAUSEA AND VOMITING, Disp: 90 tablet, Rfl: 0    potassium chloride (K-DUR,KLOR-CON) 20 mEq tablet, Take 20 mEq by mouth daily  , Disp: , Rfl:     topiramate (TOPAMAX) 25 mg tablet, TAKE 1 TABLET BY MOUTH TWICE A DAY, Disp: 180 tablet, Rfl: 1  No current facility-administered medications for this visit.  Allergies   Allergen Reactions    Ketorolac Itching and Other (See Comments)     [toradol] Itching, hives    Mushroom Extract Complex - Food Allergy Hives    Oxycodone Other (See Comments)     Irritable ,severe mood change        Objective   /82 (BP Location: Left arm)   Pulse 89   Temp (!) 96.9 °F (36.1 °C) (Tympanic)   Resp 18   Ht 5' 8\" (1.727 m)   Wt 110 kg (242 lb)   SpO2 98%   BMI 36.80 kg/m²    Physical Exam  Constitutional:       General: She is not in acute distress.     Appearance: She is well-developed.   HENT:      Head: Normocephalic and atraumatic.   Eyes:      General: No scleral icterus.     Conjunctiva/sclera: Conjunctivae normal.   Cardiovascular:      Rate and Rhythm: Normal rate.   Pulmonary:      Effort: Pulmonary effort is normal. No respiratory distress.   Skin:     General: Skin is warm.      Coloration: Skin is not pale.      Findings: No rash.   Neurological:      Mental Status: She is alert and oriented to person, place, and time.   Psychiatric:         Thought Content: Thought content normal.         Result Review  Labs:  Appointment on 09/20/2024   Component Date Value Ref Range Status    WBC 09/20/2024 10.11  4.31 - 10.16 Thousand/uL Final    RBC 09/20/2024 4.10  3.81 - 5.12 Million/uL Final    Hemoglobin 09/20/2024 12.2  11.5 - 15.4 g/dL Final    Hematocrit 09/20/2024 38.1  34.8 - 46.1 % Final    MCV 09/20/2024 93  82 - 98 fL Final    MCH 09/20/2024 29.8  26.8 - " 34.3 pg Final    MCHC 09/20/2024 32.0  31.4 - 37.4 g/dL Final    RDW 09/20/2024 14.6  11.6 - 15.1 % Final    MPV 09/20/2024 9.7  8.9 - 12.7 fL Final    Platelets 09/20/2024 419 (H)  149 - 390 Thousands/uL Final    nRBC 09/20/2024 0  /100 WBCs Final    Segmented % 09/20/2024 63  43 - 75 % Final    Immature Grans % 09/20/2024 0  0 - 2 % Final    Lymphocytes % 09/20/2024 23  14 - 44 % Final    Monocytes % 09/20/2024 11  4 - 12 % Final    Eosinophils Relative 09/20/2024 2  0 - 6 % Final    Basophils Relative 09/20/2024 1  0 - 1 % Final    Absolute Neutrophils 09/20/2024 6.45  1.85 - 7.62 Thousands/µL Final    Absolute Immature Grans 09/20/2024 0.04  0.00 - 0.20 Thousand/uL Final    Absolute Lymphocytes 09/20/2024 2.28  0.60 - 4.47 Thousands/µL Final    Absolute Monocytes 09/20/2024 1.09  0.17 - 1.22 Thousand/µL Final    Eosinophils Absolute 09/20/2024 0.19  0.00 - 0.61 Thousand/µL Final    Basophils Absolute 09/20/2024 0.06  0.00 - 0.10 Thousands/µL Final    Sodium 09/20/2024 136  135 - 147 mmol/L Final    Potassium 09/20/2024 3.8  3.5 - 5.3 mmol/L Final    Chloride 09/20/2024 101  96 - 108 mmol/L Final    CO2 09/20/2024 26  21 - 32 mmol/L Final    ANION GAP 09/20/2024 9  4 - 13 mmol/L Final    BUN 09/20/2024 13  5 - 25 mg/dL Final    Creatinine 09/20/2024 0.87  0.60 - 1.30 mg/dL Final    Standardized to IDMS reference method    Glucose 09/20/2024 97  65 - 140 mg/dL Final    If the patient is fasting, the ADA then defines impaired fasting glucose as > 100 mg/dL and diabetes as > or equal to 123 mg/dL.    Calcium 09/20/2024 8.8  8.4 - 10.2 mg/dL Final    AST 09/20/2024 21  13 - 39 U/L Final    ALT 09/20/2024 16  7 - 52 U/L Final    Specimen collection should occur prior to Sulfasalazine administration due to the potential for falsely depressed results.     Alkaline Phosphatase 09/20/2024 186 (H)  34 - 104 U/L Final    Total Protein 09/20/2024 6.9  6.4 - 8.4 g/dL Final    Albumin 09/20/2024 3.9  3.5 - 5.0 g/dL Final     Total Bilirubin 09/20/2024 0.44  0.20 - 1.00 mg/dL Final    Use of this assay is not recommended for patients undergoing treatment with eltrombopag due to the potential for falsely elevated results.  N-acetyl-p-benzoquinone imine (metabolite of Acetaminophen) will generate erroneously low results in samples for patients that have taken an overdose of Acetaminophen.    eGFR 09/20/2024 70  ml/min/1.73sq m Final    Folate 09/20/2024 7.4  >5.9 ng/mL Final    The World Health Organization has determined deficient folate concentrations are considered to be <4.0 ng/mL.    Vitamin B-12 09/20/2024 261  180 - 914 pg/mL Final    Methylmalonic Acid, S 09/20/2024 563 (H)  0 - 378 nmol/L Final    Iron Saturation 09/20/2024 24  15 - 50 % Final    TIBC 09/20/2024 389  250 - 450 ug/dL Final    Iron 09/20/2024 92  50 - 212 ug/dL Final    Patients treated with metal-binding drugs (ie. Deferoxamine) may have depressed iron values.    UIBC 09/20/2024 297  155 - 355 ug/dL Final    Ferritin 09/20/2024 17  11 - 307 ng/mL Final       Please note:  This report has been generated by a voice recognition software system. Therefore there may be syntax, spelling, and/or grammatical errors. Please call if you have any questions.

## 2024-09-30 ENCOUNTER — NURSE TRIAGE (OUTPATIENT)
Age: 64
End: 2024-09-30

## 2024-09-30 ENCOUNTER — TELEPHONE (OUTPATIENT)
Dept: HEMATOLOGY ONCOLOGY | Facility: CLINIC | Age: 64
End: 2024-09-30

## 2024-09-30 NOTE — TELEPHONE ENCOUNTER
"Pt has extreme hot flashes, Keeps her air conditioning on all the time.    Does Saloni have any recommendations in the meantime?     Answer Assessment - Initial Assessment Questions  1. DESCRIPTION: \"Describe how you are feeling.\"      Fatigue & hotflashes  2. SEVERITY: \"How bad is it?\"  \"Can you stand and walk?\"    - MILD - Feels weak or tired, but does not interfere with work, school or normal activities    - MODERATE - Able to stand and walk; weakness interferes with work, school, or normal activities    - SEVERE - Unable to stand or walk      severe  3. ONSET:  \"When did the weakness begin?\"      saturday  4. CAUSE: \"What do you think is causing the weakness?\"      Does not know  5. MEDICINES: \"Have you recently started a new medicine or had a change in the amount of a medicine?\"      no  6. OTHER SYMPTOMS: \"Do you have any other symptoms?\" (e.g., chest pain, fever, cough, SOB, vomiting, diarrhea, bleeding, other areas of pain)      no    Protocols used: Weakness (Generalized) and Fatigue-ADULT-[place]    "

## 2024-09-30 NOTE — TELEPHONE ENCOUNTER
Returned telephone call spoke with Pt.  Saloni DA SILVA concerned about possible covid or being sick.  Pt stated no she is sick no temp no achey or nausea just hot flashes.  B12 and folic acid do not typically cause hot flashes.  Saloni DA SILVA will discuss symptoms at the fu appt this week.

## 2024-09-30 NOTE — TELEPHONE ENCOUNTER
Telephone call spoke with Pt.  Saloni DA SILVA will see Pt 10/3/24 1000 am.  Pt stated she understands.

## 2024-10-02 ENCOUNTER — NURSE TRIAGE (OUTPATIENT)
Age: 64
End: 2024-10-02

## 2024-10-02 ENCOUNTER — TELEPHONE (OUTPATIENT)
Dept: FAMILY MEDICINE CLINIC | Facility: CLINIC | Age: 64
End: 2024-10-02

## 2024-10-02 NOTE — TELEPHONE ENCOUNTER
Telephone call spoke with Pt.  Reviewed symptoms.  She reports only symptom is extreme fatigue.  She is aware of her fu appt with Saloni DA SILVA tomorrow at 1100.  Denies shortness of breath.  Denies any bleeding.  She does not see need to go to the ED for being tired.  I did reach out to [facility] to see if she could change her iv iron appt to tomorrow or Friday but they do not have any availability.

## 2024-10-02 NOTE — TELEPHONE ENCOUNTER
"Called patient to further assess her symptoms reported on mychart. Pt reported her concerns originally on Monday. Pt reports having extreme fatigue even with just walking from one room to the next and effecting her ADLs. Symptoms started on Saturday and hasn't gotten worst but not resolved. Pt advised to seek ER attention due to the unrelieved symptoms and acute generalized fatigue and weakness. Pt declined at this time and would like to know if she can come in sooner for her venofer infusion that is scheduled next week. Please advise     Reason for Disposition   SEVERE weakness (i.e., unable to walk or barely able to walk, requires support) and new-onset or worsening    Answer Assessment - Initial Assessment Questions  1. DESCRIPTION: \"Describe how you are feeling.\"     Fatigue interfering with ADLS. Pt can walk from one room to another but then feels very fatigue there after   2. SEVERITY: \"How bad is it?\"  \"Can you stand and walk?\"    - MILD - Feels weak or tired, but does not interfere with work, school or normal activities    - MODERATE - Able to stand and walk; weakness interferes with work, school, or normal activities    - SEVERE - Unable to stand or walk      MODERATE -SEVERE   3. ONSET:  \"When did the weakness begin?\"      SATURDAY   4. CAUSE: \"What do you think is causing the weakness?\"      Unsure   5. MEDICINES: \"Have you recently started a new medicine or had a change in the amount of a medicine?\"      Folic acid and b12 gummies   6. OTHER SYMPTOMS: \"Do you have any other symptoms?\" (e.g., chest pain, fever, cough, SOB, vomiting, diarrhea, bleeding, other areas of pain)      Denies    Protocols used: Weakness (Generalized) and Fatigue-ADULT-[place]    "

## 2024-10-03 ENCOUNTER — TELEPHONE (OUTPATIENT)
Age: 64
End: 2024-10-03

## 2024-10-03 NOTE — TELEPHONE ENCOUNTER
Pt called to cancel todays urgent appt with Loree due to extreme fatigue and not being able to walk to her car. Pt reports her  had to get a chair as she was trying to walk to the car. Pt made aware that this is serious and would need to be seen in the ER or urgent care. Pt declined at this time and would like to get started on her venofer infusions that is scheduled next week and follow up with Loree after those are completed but will call us if she needs to get seen sooner.

## 2024-10-09 ENCOUNTER — HOSPITAL ENCOUNTER (OUTPATIENT)
Dept: INFUSION CENTER | Facility: CLINIC | Age: 64
Discharge: HOME | End: 2024-10-09
Payer: COMMERCIAL

## 2024-10-09 ENCOUNTER — TELEPHONE (OUTPATIENT)
Dept: PAIN MEDICINE | Facility: CLINIC | Age: 64
End: 2024-10-09

## 2024-10-09 VITALS
DIASTOLIC BLOOD PRESSURE: 85 MMHG | TEMPERATURE: 98.6 F | SYSTOLIC BLOOD PRESSURE: 133 MMHG | HEART RATE: 76 BPM | RESPIRATION RATE: 20 BRPM

## 2024-10-09 DIAGNOSIS — D50.8 IRON DEFICIENCY ANEMIA FOLLOWING BARIATRIC SURGERY: Primary | ICD-10-CM

## 2024-10-09 DIAGNOSIS — K95.89 IRON DEFICIENCY ANEMIA FOLLOWING BARIATRIC SURGERY: Primary | ICD-10-CM

## 2024-10-09 PROCEDURE — 96372 THER/PROPH/DIAG INJ SC/IM: CPT

## 2024-10-09 PROCEDURE — 96375 TX/PRO/DX INJ NEW DRUG ADDON: CPT

## 2024-10-09 PROCEDURE — 96365 THER/PROPH/DIAG IV INF INIT: CPT

## 2024-10-09 PROCEDURE — 96367 TX/PROPH/DG ADDL SEQ IV INF: CPT

## 2024-10-09 RX ORDER — SODIUM CHLORIDE 9 MG/ML
20 INJECTION, SOLUTION INTRAVENOUS ONCE
Status: CANCELLED | OUTPATIENT
Start: 2024-10-16

## 2024-10-09 RX ORDER — CYANOCOBALAMIN 1000 UG/ML
1000 INJECTION, SOLUTION INTRAMUSCULAR; SUBCUTANEOUS ONCE
Status: CANCELLED | OUTPATIENT
Start: 2024-10-16 | End: 2024-10-16

## 2024-10-09 RX ORDER — SODIUM CHLORIDE 9 MG/ML
20 INJECTION, SOLUTION INTRAVENOUS ONCE
Status: COMPLETED | OUTPATIENT
Start: 2024-10-09 | End: 2024-10-09

## 2024-10-09 RX ORDER — CYANOCOBALAMIN 1000 UG/ML
1000 INJECTION, SOLUTION INTRAMUSCULAR; SUBCUTANEOUS ONCE
Status: COMPLETED | OUTPATIENT
Start: 2024-10-09 | End: 2024-10-09

## 2024-10-09 RX ORDER — DIPHENHYDRAMINE HCL 12.5 MG/5ML
25 SOLUTION ORAL ONCE
Status: COMPLETED | OUTPATIENT
Start: 2024-10-09 | End: 2024-10-09

## 2024-10-09 RX ORDER — DIPHENHYDRAMINE HCL 12.5 MG/5ML
25 SOLUTION ORAL ONCE
Status: CANCELLED
Start: 2024-10-16 | End: 2024-10-16

## 2024-10-09 RX ADMIN — DEXAMETHASONE SODIUM PHOSPHATE 8 MG: 10 INJECTION, SOLUTION INTRAMUSCULAR; INTRAVENOUS at 14:14

## 2024-10-09 RX ADMIN — SODIUM CHLORIDE 20 ML/HR: 0.9 INJECTION, SOLUTION INTRAVENOUS at 14:10

## 2024-10-09 RX ADMIN — FAMOTIDINE 20 MG: 10 INJECTION INTRAVENOUS at 14:34

## 2024-10-09 RX ADMIN — IRON SUCROSE 200 MG: 20 INJECTION, SOLUTION INTRAVENOUS at 14:50

## 2024-10-09 RX ADMIN — DIPHENHYDRAMINE HCL ORAL 25 MG: 25 SOLUTION ORAL at 14:15

## 2024-10-09 RX ADMIN — CYANOCOBALAMIN 1000 MCG: 1000 INJECTION, SOLUTION INTRAMUSCULAR at 14:15

## 2024-10-09 NOTE — PROGRESS NOTES
Pt tolerated venofer with premeds and B12 without incident. Pt declined AVS, aware of next appt 10/16 at 1pm.

## 2024-10-11 ENCOUNTER — RA CDI HCC (OUTPATIENT)
Dept: OTHER | Facility: HOSPITAL | Age: 64
End: 2024-10-11

## 2024-10-11 DIAGNOSIS — Z86.718 HISTORY OF DVT (DEEP VEIN THROMBOSIS): Primary | ICD-10-CM

## 2024-10-11 PROBLEM — S52.502A CLOSED FRACTURE OF DISTAL ENDS OF LEFT RADIUS AND ULNA: Status: RESOLVED | Noted: 2020-07-03 | Resolved: 2024-10-11

## 2024-10-11 PROBLEM — S52.513A CLOSED FRACTURE OF STYLOID PROCESS OF RADIUS: Status: RESOLVED | Noted: 2018-07-16 | Resolved: 2024-10-11

## 2024-10-11 PROBLEM — Z01.818 PREOPERATIVE CLEARANCE: Status: RESOLVED | Noted: 2023-09-27 | Resolved: 2024-10-11

## 2024-10-11 PROBLEM — S52.601A RIGHT DISTAL ULNAR FRACTURE: Status: RESOLVED | Noted: 2020-07-08 | Resolved: 2024-10-11

## 2024-10-11 PROBLEM — S52.602A CLOSED FRACTURE OF DISTAL ENDS OF LEFT RADIUS AND ULNA: Status: RESOLVED | Noted: 2020-07-03 | Resolved: 2024-10-11

## 2024-10-11 PROBLEM — S22.41XA FRACTURE OF MULTIPLE RIBS OF RIGHT SIDE: Status: RESOLVED | Noted: 2020-07-03 | Resolved: 2024-10-11

## 2024-10-11 PROBLEM — M19.011 PRIMARY OSTEOARTHRITIS OF RIGHT SHOULDER: Status: RESOLVED | Noted: 2021-10-18 | Resolved: 2024-10-11

## 2024-10-11 PROBLEM — Z86.711 HISTORY OF PULMONARY EMBOLISM: Status: ACTIVE | Noted: 2018-06-22

## 2024-10-15 ENCOUNTER — OFFICE VISIT (OUTPATIENT)
Age: 64
End: 2024-10-15
Payer: COMMERCIAL

## 2024-10-15 VITALS
HEART RATE: 88 BPM | OXYGEN SATURATION: 99 % | WEIGHT: 245 LBS | TEMPERATURE: 98.1 F | DIASTOLIC BLOOD PRESSURE: 92 MMHG | HEIGHT: 68 IN | SYSTOLIC BLOOD PRESSURE: 138 MMHG | BODY MASS INDEX: 37.13 KG/M2

## 2024-10-15 DIAGNOSIS — F31.9 BIPOLAR DEPRESSION (HCC): ICD-10-CM

## 2024-10-15 DIAGNOSIS — R74.8 ELEVATED ALKALINE PHOSPHATASE LEVEL: Primary | ICD-10-CM

## 2024-10-15 DIAGNOSIS — M20.5X9: ICD-10-CM

## 2024-10-15 PROCEDURE — G2211 COMPLEX E/M VISIT ADD ON: HCPCS | Performed by: FAMILY MEDICINE

## 2024-10-15 PROCEDURE — 99214 OFFICE O/P EST MOD 30 MIN: CPT | Performed by: FAMILY MEDICINE

## 2024-10-15 RX ORDER — LORAZEPAM 0.5 MG/1
0.5 TABLET ORAL DAILY PRN
Qty: 15 TABLET | Refills: 1 | Status: SHIPPED | OUTPATIENT
Start: 2024-10-15

## 2024-10-16 ENCOUNTER — HOSPITAL ENCOUNTER (OUTPATIENT)
Dept: INFUSION CENTER | Facility: CLINIC | Age: 64
Discharge: HOME | End: 2024-10-16
Payer: COMMERCIAL

## 2024-10-16 ENCOUNTER — TELEPHONE (OUTPATIENT)
Age: 64
End: 2024-10-16

## 2024-10-16 VITALS
DIASTOLIC BLOOD PRESSURE: 84 MMHG | HEART RATE: 89 BPM | TEMPERATURE: 99 F | SYSTOLIC BLOOD PRESSURE: 138 MMHG | RESPIRATION RATE: 18 BRPM

## 2024-10-16 DIAGNOSIS — K95.89 IRON DEFICIENCY ANEMIA FOLLOWING BARIATRIC SURGERY: Primary | ICD-10-CM

## 2024-10-16 DIAGNOSIS — Z12.11 COLON CANCER SCREENING: Primary | ICD-10-CM

## 2024-10-16 DIAGNOSIS — D50.8 IRON DEFICIENCY ANEMIA FOLLOWING BARIATRIC SURGERY: Primary | ICD-10-CM

## 2024-10-16 PROCEDURE — 96365 THER/PROPH/DIAG IV INF INIT: CPT

## 2024-10-16 PROCEDURE — 96372 THER/PROPH/DIAG INJ SC/IM: CPT

## 2024-10-16 PROCEDURE — 96375 TX/PRO/DX INJ NEW DRUG ADDON: CPT

## 2024-10-16 PROCEDURE — 96367 TX/PROPH/DG ADDL SEQ IV INF: CPT

## 2024-10-16 RX ORDER — CYANOCOBALAMIN 1000 UG/ML
1000 INJECTION, SOLUTION INTRAMUSCULAR; SUBCUTANEOUS ONCE
Status: CANCELLED | OUTPATIENT
Start: 2024-10-24 | End: 2024-10-23

## 2024-10-16 RX ORDER — SODIUM CHLORIDE 9 MG/ML
20 INJECTION, SOLUTION INTRAVENOUS ONCE
Status: COMPLETED | OUTPATIENT
Start: 2024-10-16 | End: 2024-10-16

## 2024-10-16 RX ORDER — SODIUM CHLORIDE 9 MG/ML
20 INJECTION, SOLUTION INTRAVENOUS ONCE
Status: CANCELLED | OUTPATIENT
Start: 2024-10-24

## 2024-10-16 RX ORDER — DIPHENHYDRAMINE HCL 12.5 MG/5ML
25 SOLUTION ORAL ONCE
Status: COMPLETED | OUTPATIENT
Start: 2024-10-16 | End: 2024-10-16

## 2024-10-16 RX ORDER — DIPHENHYDRAMINE HCL 12.5 MG/5ML
25 SOLUTION ORAL ONCE
Status: CANCELLED
Start: 2024-10-24 | End: 2024-10-23

## 2024-10-16 RX ORDER — CYANOCOBALAMIN 1000 UG/ML
1000 INJECTION, SOLUTION INTRAMUSCULAR; SUBCUTANEOUS ONCE
Status: COMPLETED | OUTPATIENT
Start: 2024-10-16 | End: 2024-10-16

## 2024-10-16 RX ADMIN — IRON SUCROSE 200 MG: 20 INJECTION, SOLUTION INTRAVENOUS at 13:55

## 2024-10-16 RX ADMIN — SODIUM CHLORIDE 20 ML/HR: 0.9 INJECTION, SOLUTION INTRAVENOUS at 13:07

## 2024-10-16 RX ADMIN — DIPHENHYDRAMINE HCL ORAL 25 MG: 25 SOLUTION ORAL at 13:31

## 2024-10-16 RX ADMIN — CYANOCOBALAMIN 1000 MCG: 1000 INJECTION, SOLUTION INTRAMUSCULAR at 13:34

## 2024-10-16 RX ADMIN — DEXAMETHASONE SODIUM PHOSPHATE 8 MG: 10 INJECTION, SOLUTION INTRAMUSCULAR; INTRAVENOUS at 13:15

## 2024-10-16 RX ADMIN — FAMOTIDINE 20 MG: 10 INJECTION INTRAVENOUS at 13:32

## 2024-10-16 NOTE — PROGRESS NOTES
Pt tolerated venofer infusion without incident.  Pt declined AVS but is aware of her next appt on Oct 24 at 2:00

## 2024-10-17 NOTE — PROGRESS NOTES
Assessment/Plan:    62 y/o woman with: worsening elevated alk phos with bipolar d/o and toe issues. Will check US and refer to GI. Will refer to Podiatry and continue meds. PAPDMP reviewed and refilled. Discussed supportive care and return parameters.     No problem-specific Assessment & Plan notes found for this encounter.       Diagnoses and all orders for this visit:    Elevated alkaline phosphatase level  -     US right upper quadrant; Future  -     Ambulatory Referral to Gastroenterology; Future    Bipolar depression (HCC)  -     LORazepam (ATIVAN) 0.5 mg tablet; Take 1 tablet (0.5 mg total) by mouth daily as needed for anxiety    Clubbing of toes, unspecified laterality  -     Ambulatory Referral to Podiatry; Future          Subjective:     Chief Complaint   Patient presents with    Follow-up     Pt is here to discuss lab results. Pt stated she received the Flu and covid vaccine somewhere else.     Foot Problem     4th toe is bending down on her right foot. Pt is concerns and would like to get it checked.     Medication Refill     Pt needs refill on Lorazepam. No further concerns CC        Patient ID: Li Torre is a 63 y.o. female.    Patient is a 62 y/o woman who presents for follow-up on worsening elevated alk phos with bipolar d/o and toe issues no fevers chills nausea or vomiting pt also stable on meds.    Medication Refill        The following portions of the patient's history were reviewed and updated as appropriate: allergies, current medications, past family history, past medical history, past social history, past surgical history and problem list.    Review of Systems   Constitutional: Negative.    HENT: Negative.     Eyes: Negative.    Respiratory: Negative.     Cardiovascular: Negative.    Gastrointestinal: Negative.    Endocrine: Negative.    Genitourinary: Negative.    Musculoskeletal: Negative.    Allergic/Immunologic: Negative.    Neurological: Negative.    Hematological: Negative.   "  Psychiatric/Behavioral: Negative.     All other systems reviewed and are negative.        Objective:      /92 (BP Location: Right arm, Patient Position: Sitting, Cuff Size: Large)   Pulse 88   Temp 98.1 °F (36.7 °C) (Temporal)   Ht 5' 8\" (1.727 m)   Wt 111 kg (245 lb)   SpO2 99%   BMI 37.25 kg/m²          Physical Exam  Constitutional:       Appearance: She is well-developed.   HENT:      Head: Atraumatic.      Right Ear: External ear normal.      Left Ear: External ear normal.   Eyes:      Conjunctiva/sclera: Conjunctivae normal.      Pupils: Pupils are equal, round, and reactive to light.   Cardiovascular:      Rate and Rhythm: Normal rate and regular rhythm.      Heart sounds: Normal heart sounds.   Pulmonary:      Effort: Pulmonary effort is normal. No respiratory distress.      Breath sounds: Normal breath sounds.   Abdominal:      General: There is no distension.      Palpations: Abdomen is soft.      Tenderness: There is no abdominal tenderness. There is no guarding or rebound.   Musculoskeletal:         General: Normal range of motion.      Cervical back: Normal range of motion.   Skin:     General: Skin is warm and dry.   Neurological:      Mental Status: She is alert and oriented to person, place, and time.      Cranial Nerves: No cranial nerve deficit.   Psychiatric:         Behavior: Behavior normal.         Thought Content: Thought content normal.         Judgment: Judgment normal.         "

## 2024-10-22 NOTE — PROGRESS NOTES
Cardiology Consultation     Li Torre  [id number]  1960  [facility] CARDIOLOGY [place]  [address] [id number]      1. Primary hypertension  POCT ECG    amLODIPine (NORVASC) 5 mg tablet      2. Mixed hyperlipidemia  POCT ECG    Lipid panel    atorvastatin (LIPITOR) 40 mg tablet      3. History of DVT (deep vein thrombosis)  POCT ECG      4. History of pulmonary embolism        5. Orthostatic hypotension        6. Nonrheumatic aortic valve stenosis  Echo complete w/ contrast if indicated      7. Bilateral leg edema  Basic metabolic panel    furosemide (LASIX) 20 mg tablet          Discussion/Summary:  Hypertension  - Continue with amlodipine 5 mg daily  - Nuclear pharmacologic stress test 10/4/2022 at [facility] showed no evidence of inducible ischemia or infarct, LVEF 62%  -Blood pressure is well-controlled today  Lower extremity edema  - prescribed furosemide 20 mg daily as needed  - Patient reports taking Lasix several days a week  - Also prescribed potassium 20 mEq daily as needed but eating a banana instead because she is nervous about taking potassium  - Pending repeat BMP  Aortic stenosis  - TTE 7/9/2019 showed EF 60%, grade 1 DD, very mild AS  - Has a mild systolic murmur on exam, likely AS  - Recheck TTE to better evaluate  Hyperlipidemia  - Continue with atorvastatin 40 mg daily  - Lipid profile 9/28/2021 showed total cholesterol 230, Triclos with 129, HDL 70, -> statin reportedly started after this lipid profile  - Pending repeat lipid profile  DVT/PE  - Anticoagulated on Eliquis 5 mg twice daily  Orthostatic hypotension  -Reports having orthostatic hypotension several years ago, symptoms now resolved  Obesity  - BMI of 36.8  - History of gastric sleeve  Bipolar 2 disorder  Depression  Migraines  Adrenal insufficiency (Sarah Ann's disease)    Follow-up in 3 months.    History of Present Illness:  Li Torre is a 63 y.o. year old female with a past medical history of  hypertension, hyperlipidemia, orthostatic hypotension, bipolar 2 disorder, history of DVT/PE, depression, and migraines.  She reports feeling well today and has no cardiac complaints.  She was previously following with [facility] cardiology but wanted to switch to have all of her physicians with [facility].  She reports her [facility] cardiologist was prescribing her Lasix for her leg swelling and also managing her cholesterol.  Denies any episodes of chest pain, palpitations, shortness of breath, worsening lower extremity edema, other concerning cardiac symptoms at rest or with exertion at this time.    Patient Active Problem List   Diagnosis    Bipolar depression (HCC)    Hypokalemia    Adrenal insufficiency (Andrés's disease) (HCC)    Vertigo    Fibromyalgia    Osteoarthritis of lumbosacral spine without myelopathy    HLD (hyperlipidemia)    Orthostatic hypotension    History of TIAs    History of migraine    Neck pain    Low back pain    Hypertension    Bipolar II disorder (HCC)    Chronic back pain    Anxiety    Leukocytosis    Chronic anticoagulation    Anemia    Ambulatory dysfunction    Edema    History of anxiety    History of pulmonary embolism    Primary osteoarthritis of left shoulder    Status post total replacement of left shoulder    Alkalosis    Primary osteoarthritis of right shoulder    S/P reverse total shoulder arthroplasty, left    Iron deficiency anemia following bariatric surgery    Vitamin B 12 deficiency    Bilateral occipital neuralgia    Fatigue    Anticoagulated by anticoagulation treatment    History of peptic ulcer disease    Hyperglycemia    Hypersomnia    Intractable vomiting with nausea    Lumbar spondylosis    Migraine aura, persistent    Mild episode of recurrent major depressive disorder (HCC)    Myofascial pain    Peripheral neuropathy    Recurrent falls    Right hip pain    Subclinical hyperthyroidism    Thoracic or lumbosacral neuritis or radiculitis    Weakness of both  "lower extremities    Postmenopausal    Obesity (BMI 30-39.9)    S/P reverse total shoulder arthroplasty, right    Aftercare following right shoulder joint replacement surgery    IFG (impaired fasting glucose)    Other hyperlipidemia    Obesity, morbid (HCC)    Gastroesophageal reflux disease without esophagitis    RUQ discomfort    Sacroiliitis ([facility])    History of DVT (deep vein thrombosis)    Elevated alkaline phosphatase level     Past Medical History:   Diagnosis Date    Adrenal insufficiency (Andrés's disease) ([facility])     Anemia 09 15 2022    Was found in bloodwork done on that day    Anxiety     Arthritis     Bilateral pulmonary contusion 07/03/2020    Bipolar disorder     Cervical radiculopathy     Chest pain     seen in ER 9/18, dx with gas    Chronic back pain     Chronic pain disorder     lumbar    Closed head injury with brief loss of consciousness ([facility]) 10/15/2019    Contusion of right hand 07/03/2021    Depression     DVT, lower extremity ([facility])     1991 and 2019 now on eliquis    Exercises 5 to 6 times per week     5 days per week with weights/band and also goes to PT 2x/week    Fall down stairs 07/03/2020    Fibromyalgia     Fibromyalgia, primary     GERD (gastroesophageal reflux disease)     Headache(784.0) 2018    I get migraines. See Neurologist for this.    Hematoma of parietal scalp 07/03/2020    History of Clostridioides difficile infection     History of MRSA infection     pt denies having this    History of recent fall 07/2021    \"possibly injured left shoulder'    History of TIAs     cannot remember details    Hypertension     Hypokalemia     Intractable migraine without aura and without status migrainosus 10/02/2019    Left shoulder pain     \"not too bad\" goes to PT 2x/week    Migraine     Obesity 2019    Psychiatric disorder     Anxiety, major depression, bipolar    Spinal stenosis     Syncope 2014    orthostatic hypotension    Wears dentures     upper    Wears glasses      Social History "     Socioeconomic History    Marital status: /Civil Union     Spouse name: Not on file    Number of children: Not on file    Years of education: Not on file    Highest education level: Not on file   Occupational History    Occupation: retired   Tobacco Use    Smoking status: Former     Current packs/day: 0.00     Average packs/day: 0.2 packs/day for 20.0 years (4.0 ttl pk-yrs)     Types: Cigarettes     Start date: 1998     Quit date: 2008     Years since quittin.8    Smokeless tobacco: Never    Tobacco comments:     quit   Vaping Use    Vaping status: Never Used   Substance and Sexual Activity    Alcohol use: Not Currently     Alcohol/week: 2.0 standard drinks of alcohol     Types: 1 Glasses of wine, 1 Standard drinks or equivalent per week     Comment: occasionally    Drug use: Never     Comment: na    Sexual activity: Not Currently     Partners: Male     Birth control/protection: Abstinence     Comment: defer   Other Topics Concern    Not on file   Social History Narrative    Not on file     Social Determinants of Health     Financial Resource Strain: Not on file   Food Insecurity: No Food Insecurity (2024)    Nursing - Inadequate Food Risk Classification     Worried About Running Out of Food in the Last Year: Never true     Ran Out of Food in the Last Year: Never true     Ran Out of Food in the Last Year: Not on file   Transportation Needs: No Transportation Needs (2024)    PRAPARE - Transportation     Lack of Transportation (Medical): No     Lack of Transportation (Non-Medical): No   Physical Activity: Not on file   Stress: Not on file   Social Connections: Not on file   Intimate Partner Violence: Not on file   Housing Stability: Low Risk  (2024)    Housing Stability Vital Sign     Unable to Pay for Housing in the Last Year: No     Number of Times Moved in the Last Year: 1     Homeless in the Last Year: No      Family History   Problem Relation Age of Onset    Breast cancer  Mother 37    Migraines Mother     Arthritis Mother     Depression Mother     Cancer Mother     Anxiety disorder Mother     Kidney disease Father     Heart disease Father     Diabetes Father     Arthritis Father     Hypertension Father     Brain cancer Brother     Seizures Brother      Past Surgical History:   Procedure Laterality Date    APPENDECTOMY      CAST APPLICATION Left 07/04/2020    Procedure: Application short-arm splint;  Surgeon: Sony Zhang MD;  Location: [facility];  Service: Orthopedics    COLONOSCOPY      FL INJECTION LEFT SHOULDER (ARTHROGRAM)  11/10/2021    GASTRIC RESTRICTION SURGERY      Gastric Sleeve Nov 2015    HIP SURGERY  2016    Hip Replacement    HYSTERECTOMY      DONALD    JOINT REPLACEMENT      Left Knee 2011 and Right Hip 2010    KNEE SURGERY  2015    Knee Replacement    OOPHORECTOMY      ORIF WRIST FRACTURE Left 07/04/2020    Procedure: Open reduction and internal fixation left radius and ulnar shaft fracture;  Surgeon: Sony Zhang MD;  Location: [facility];  Service: Orthopedics    KS ARTHROPLASTY GLENOHUMERAL JOINT TOTAL SHOULDER Left 03/30/2021    Procedure: ARTHROPLASTY SHOULDER - anatomic;  Surgeon: Daryn Daniel MD;  Location: [facility];  Service: Orthopedics    KS ARTHROPLASTY GLENOHUMERAL JOINT TOTAL SHOULDER Right 10/24/2023    Procedure: ANATOMIC VS REVERSE TOTAL SHOULDER ARTHROPLASTY, WITH BICEPS TENDONESIS;  Surgeon: Daryn Daniel MD;  Location: [facility];  Service: Orthopedics    KS NATIVIDAD SHOULDER ARTHRPLSTY HUMERAL&GLENOID COMPNT Left 12/21/2021    Procedure: REVISION OF TOTAL SHOULDER ARTHROPLASTY TO REVERSE TOTAL SHOULDER ARTHROPLASTY;  Surgeon: Daryn Daniel MD;  Location: [facility];  Service: Orthopedics       Current Outpatient Medications:     amLODIPine (NORVASC) 5 mg tablet, Take 1 tablet (5 mg total) by mouth daily, Disp: 90 tablet, Rfl: 1    apixaban (Eliquis) 5 mg, Take 1 tablet (5 mg total) by mouth 2 (two) times a day,  Disp: 180 tablet, Rfl: 3    ARIPiprazole (ABILIFY) 5 mg tablet, Take 1 tablet (5 mg total) by mouth daily, Disp: 90 tablet, Rfl: 1    atorvastatin (LIPITOR) 40 mg tablet, Take 1 tablet (40 mg total) by mouth daily, Disp: 90 tablet, Rfl: 2    folic acid (KP Folic Acid) 1 mg tablet, Take 1 tablet (1 mg total) by mouth daily, Disp: 90 tablet, Rfl: 1    furosemide (LASIX) 20 mg tablet, Take 1 tablet (20 mg total) by mouth as needed (leg swelling) Pt reports calls her [facility] cardiologist Dr Michel before taking, Disp: 90 tablet, Rfl: 1    gabapentin (NEURONTIN) 600 MG tablet, Take 1 tablet (600 mg total) by mouth 3 (three) times a day, Disp: 270 tablet, Rfl: 1    lamoTRIgine (LaMICtal) 200 MG tablet, Take 200 mg by mouth daily., Disp: , Rfl:     LORazepam (ATIVAN) 0.5 mg tablet, Take 1 tablet (0.5 mg total) by mouth daily as needed for anxiety, Disp: 15 tablet, Rfl: 1    meclizine (ANTIVERT) 12.5 MG tablet, Take 1 tablet (12.5 mg total) by mouth every 8 (eight) hours as needed for dizziness, Disp: 30 tablet, Rfl: 1    methocarbamol (ROBAXIN) 750 mg tablet, Take 1 tablet (750 mg total) by mouth every 8 (eight) hours as needed for muscle spasms, Disp: 30 tablet, Rfl: 0    omeprazole (PriLOSEC) 40 MG capsule, Take 1 capsule (40 mg total) by mouth daily, Disp: 90 capsule, Rfl: 1    ondansetron (ZOFRAN-ODT) 4 mg disintegrating tablet, TAKE 1 TABLET BY MOUTH EVERY 8 HOURS AS NEEDED FOR NAUSEA AND VOMITING, Disp: 90 tablet, Rfl: 0    potassium chloride (K-DUR,KLOR-CON) 20 mEq tablet, Take 20 mEq by mouth daily  , Disp: , Rfl:     topiramate (TOPAMAX) 25 mg tablet, TAKE 1 TABLET BY MOUTH TWICE A DAY, Disp: 180 tablet, Rfl: 1    cholecalciferol (VITAMIN D3) 1,000 units tablet, , Disp: , Rfl:   Allergies   Allergen Reactions    Ketorolac Itching and Other (See Comments)     [toradol] Itching, hives    Mushroom Extract Complex - Food Allergy Hives    Oxycodone Other (See Comments)     Irritable ,severe mood change          Labs:  Lab  Results   Component Value Date    ALT 16 09/20/2024    AST 21 09/20/2024    BUN 13 09/20/2024    CALCIUM 8.8 09/20/2024     09/20/2024    CHOL 174 04/05/2015    CO2 26 09/20/2024    CREATININE 0.87 09/20/2024    HDL 66 09/15/2022    HCT 38.1 09/20/2024    HGB 12.2 09/20/2024    HGBA1C 6.1 (H) 09/22/2023    MG 2.1 07/01/2021    PHOS 3.1 09/28/2021     (H) 09/20/2024    K 3.8 09/20/2024     07/07/2015    TRIG 122 09/15/2022    WBC 10.11 09/20/2024       Imaging: FL spine and pain procedure    Result Date: 9/25/2024  Narrative: Indication: Low back/buttock pain Preoperative Diagnosis: 1. Sacroiliac Joint Pain 2. Sacroiliitis Postoperative Diagnosis: 1. Sacroiliac Joint Pain 2. Sacroiliitis Procedure: Fluoroscopically-guided injection of the bilateral sacroiliac joints EBL: none Specimens: not applicable After discussing the risks, benefits, and alternatives to the procedure, the patient expressed understanding and wished to proceed. The patient was brought to the fluoroscopy suite and placed in the prone position. Procedural pause conducted to verify: correct patient identity, procedure to be performed and as applicable, correct side and site, correct patient position, and availability of implants, special equipment and special requirements. Using fluoroscopy, the inferior portion of the sacroiliac joint was identified. The skin was sterilely prepped and draped in the usual fashion using Chloraprep skin prep. Using fluoroscopic guidance, a 3.5 inch 25 gauge spinal needle was advanced into joint. Proper needle positioning was confirmed using multiple fluoroscopic views. After negative aspiration, Omnipaque 300 contrast was injected, showing intraarticular spread of contrast without any evidence of intravascular uptake. A 2.5 mL solution consisting of 40 mg of Depo-Medrol in 0.2% ropivacaine was injected slowly and incrementally into the joint. Following the injection, the needle was withdrawn. The  "procedure was repeated in the exact same way on the opposite side. The patient tolerated the procedure well and there were no apparent complications. After appropriate observation, the patient was dismissed from the clinic in good condition under their own power.      ECG: 10/23/2024: Normal sinus rhythm, borderline LVH    Review of Systems:  Review of Systems   Constitutional:  Negative for chills, diaphoresis, fatigue and fever.   HENT:  Negative for congestion.    Eyes:  Negative for photophobia and visual disturbance.   Respiratory:  Negative for chest tightness and shortness of breath.    Cardiovascular:  Negative for chest pain, palpitations and leg swelling.   Gastrointestinal:  Negative for abdominal distention, abdominal pain, diarrhea, nausea and vomiting.   Genitourinary:  Negative for difficulty urinating and dysuria.   Musculoskeletal:  Negative for arthralgias, gait problem and joint swelling.   Skin:  Negative for color change, pallor and rash.   Neurological:  Negative for dizziness, syncope, numbness and headaches.   Psychiatric/Behavioral:  Negative for agitation, behavioral problems and confusion.          Vitals:    10/23/24 1457   BP: 134/80   Pulse: 83      Vitals:    10/23/24 1457   Weight: 110 kg (242 lb)     Height: 5' 8\" (172.7 cm)     Physical Exam:  General appearance:  Appears stated age, alert, well appearing and in no distress  HEENT:  PERRLA, EOMI, no scleral icterus, no conjunctival pallor  NECK:  Supple, No elevated JVP, no thyromegaly, no carotid bruits  HEART:  Regular rate and rhythm, normal S1/S2, no S3/S4, 2/6 systolic murmur  LUNGS:  Clear to auscultation bilaterally  ABDOMEN:  Soft, non-tender, positive bowel sounds, no rebound or guarding, no organomegaly   EXTREMITIES: Trace bilateral lower extremity edema  VASCULAR:  Normal pedal pulses   SKIN: No lesions or rashes on exposed skin  NEURO:  CN II-XII intact, no focal deficits   "

## 2024-10-23 ENCOUNTER — OFFICE VISIT (OUTPATIENT)
Dept: CARDIOLOGY CLINIC | Facility: CLINIC | Age: 64
End: 2024-10-23
Payer: COMMERCIAL

## 2024-10-23 ENCOUNTER — OFFICE VISIT (OUTPATIENT)
Dept: PAIN MEDICINE | Facility: MEDICAL CENTER | Age: 64
End: 2024-10-23
Payer: COMMERCIAL

## 2024-10-23 VITALS
SYSTOLIC BLOOD PRESSURE: 134 MMHG | HEART RATE: 83 BPM | HEIGHT: 68 IN | DIASTOLIC BLOOD PRESSURE: 80 MMHG | BODY MASS INDEX: 36.68 KG/M2 | WEIGHT: 242 LBS

## 2024-10-23 VITALS
HEART RATE: 91 BPM | DIASTOLIC BLOOD PRESSURE: 75 MMHG | BODY MASS INDEX: 36.83 KG/M2 | WEIGHT: 243 LBS | HEIGHT: 68 IN | SYSTOLIC BLOOD PRESSURE: 135 MMHG

## 2024-10-23 DIAGNOSIS — E78.2 MIXED HYPERLIPIDEMIA: Chronic | ICD-10-CM

## 2024-10-23 DIAGNOSIS — Z86.718 HISTORY OF DVT (DEEP VEIN THROMBOSIS): ICD-10-CM

## 2024-10-23 DIAGNOSIS — M54.50 CHRONIC BILATERAL LOW BACK PAIN WITHOUT SCIATICA: ICD-10-CM

## 2024-10-23 DIAGNOSIS — G89.29 CHRONIC BILATERAL LOW BACK PAIN WITHOUT SCIATICA: ICD-10-CM

## 2024-10-23 DIAGNOSIS — M47.816 LUMBAR SPONDYLOSIS: ICD-10-CM

## 2024-10-23 DIAGNOSIS — I95.1 ORTHOSTATIC HYPOTENSION: ICD-10-CM

## 2024-10-23 DIAGNOSIS — M46.1 SACROILIITIS (HCC): Primary | ICD-10-CM

## 2024-10-23 DIAGNOSIS — I10 PRIMARY HYPERTENSION: Primary | ICD-10-CM

## 2024-10-23 DIAGNOSIS — R60.0 BILATERAL LEG EDEMA: ICD-10-CM

## 2024-10-23 DIAGNOSIS — I35.0 NONRHEUMATIC AORTIC VALVE STENOSIS: ICD-10-CM

## 2024-10-23 DIAGNOSIS — Z86.711 HISTORY OF PULMONARY EMBOLISM: ICD-10-CM

## 2024-10-23 PROCEDURE — 99213 OFFICE O/P EST LOW 20 MIN: CPT

## 2024-10-23 PROCEDURE — 99204 OFFICE O/P NEW MOD 45 MIN: CPT | Performed by: STUDENT IN AN ORGANIZED HEALTH CARE EDUCATION/TRAINING PROGRAM

## 2024-10-23 PROCEDURE — 93000 ELECTROCARDIOGRAM COMPLETE: CPT | Performed by: STUDENT IN AN ORGANIZED HEALTH CARE EDUCATION/TRAINING PROGRAM

## 2024-10-23 PROCEDURE — G2211 COMPLEX E/M VISIT ADD ON: HCPCS

## 2024-10-23 RX ORDER — FUROSEMIDE 20 MG/1
20 TABLET ORAL AS NEEDED
Qty: 90 TABLET | Refills: 1 | Status: SHIPPED | OUTPATIENT
Start: 2024-10-23

## 2024-10-23 RX ORDER — AMLODIPINE BESYLATE 5 MG/1
5 TABLET ORAL DAILY
Qty: 90 TABLET | Refills: 1 | Status: SHIPPED | OUTPATIENT
Start: 2024-10-23

## 2024-10-23 RX ORDER — ATORVASTATIN CALCIUM 40 MG/1
40 TABLET, FILM COATED ORAL DAILY
Qty: 90 TABLET | Refills: 2 | Status: SHIPPED | OUTPATIENT
Start: 2024-10-23

## 2024-10-23 NOTE — PROGRESS NOTES
Assessment:  1. Sacroiliitis (HCC)    2. Lumbar spondylosis    3. Chronic bilateral low back pain without sciatica        Plan:  Patient is reporting total resolution of low back pain following recent bilateral sacroiliac joint injections.  Her pain is well-controlled at this time and she does not feel she requires any further interventional treatment. She will follow-up with us on an as-needed basis at this time.    This patient is being managed for a complex and serious condition that requires ongoing, intensive medical management. The nature of this condition demands constant vigilance and a nuanced approach to treatment. The condition necessitates an in-depth and focused approach to management, including regular monitoring and potential coordination with other healthcare professionals.     Detailed Description of Visit Complexity: This visit involved an intricate evaluation and management of the patient's condition. The complexity of the visit was due to the need for a detailed assessment of the current state, consideration of potential complications, and a careful balancing of treatment options to manage the condition effectively.     Patient's Health Status and History: This patient has a significant pain history which requires regular and detailed management. The condition's impact on their life and health is substantial, necessitating a comprehensive and tailored approach to chronic ongoing care.     My impressions and treatment recommendations were discussed in detail with the patient who verbalized understanding and had no further questions.  Discharge instructions were provided. I personally saw and examined the patient and I agree with the above discussed plan of care.    History of Present Illness:  Li Torre is a 63 y.o. female who presents for a follow up office visit after undergoing bilateral sacroiliac joint injections. She reports total resolution of pain symptoms at this point.  She reports  that her current pain level is a 0/10 in intensity.  Patient only has pain when she bends over to  heavy items out of her stove, at which point she experiences a cramping sensation in the low back which lasts several minutes before resolving spontaneously.  Advised she should avoid activities that exacerbate her pain. She is able to report ongoing relief of pain in the lumbar region as well after undergoing bilateral L3-L5 radiofrequency ablations.    I have personally reviewed and/or updated the patient's past medical history, past surgical history, family history, social history, current medications, allergies, and vital signs today.     Review of Systems   Constitutional:  Negative for fatigue.   HENT:  Negative for ear pain, hearing loss and sinus pressure.    Eyes:  Negative for pain.   Respiratory:  Negative for wheezing.    Cardiovascular:  Negative for palpitations.   Gastrointestinal:  Negative for abdominal pain.   Endocrine: Negative for polyuria.   Genitourinary:  Negative for frequency.   Musculoskeletal:  Positive for gait problem. Negative for myalgias.   Skin:  Negative for rash.   Neurological:  Negative for numbness and headaches.   Psychiatric/Behavioral:  Negative for dysphoric mood and sleep disturbance.        Patient Active Problem List   Diagnosis    Bipolar depression (HCC)    Hypokalemia    Adrenal insufficiency (Andrés's disease) (HCC)    Vertigo    Fibromyalgia    Osteoarthritis of lumbosacral spine without myelopathy    HLD (hyperlipidemia)    Orthostatic hypotension    History of TIAs    History of migraine    Neck pain    Low back pain    Hypertension    Bipolar II disorder (HCC)    Chronic back pain    Anxiety    Leukocytosis    Chronic anticoagulation    Anemia    Ambulatory dysfunction    Edema    History of anxiety    History of pulmonary embolism    Primary osteoarthritis of left shoulder    Status post total replacement of left shoulder    Alkalosis    Primary  osteoarthritis of right shoulder    S/P reverse total shoulder arthroplasty, left    Iron deficiency anemia following bariatric surgery    Vitamin B 12 deficiency    Bilateral occipital neuralgia    Fatigue    Anticoagulated by anticoagulation treatment    History of peptic ulcer disease    Hyperglycemia    Hypersomnia    Intractable vomiting with nausea    Lumbar spondylosis    Migraine aura, persistent    Mild episode of recurrent major depressive disorder (HCC)    Myofascial pain    Peripheral neuropathy    Recurrent falls    Right hip pain    Subclinical hyperthyroidism    Thoracic or lumbosacral neuritis or radiculitis    Weakness of both lower extremities    Postmenopausal    Obesity (BMI 30-39.9)    S/P reverse total shoulder arthroplasty, right    Aftercare following right shoulder joint replacement surgery    IFG (impaired fasting glucose)    Other hyperlipidemia    Obesity, morbid (HCC)    Gastroesophageal reflux disease without esophagitis    RUQ discomfort    Sacroiliitis (HCC)    History of DVT (deep vein thrombosis)    Elevated alkaline phosphatase level       Past Medical History:   Diagnosis Date    Adrenal insufficiency ([place]'s disease) ([facility])     Anemia 09 15 2022    Was found in bloodwork done on that day    Anxiety     Arthritis     Bilateral pulmonary contusion 07/03/2020    Bipolar disorder     Cervical radiculopathy     Chest pain     seen in ER 9/18, dx with gas    Chronic back pain     Chronic pain disorder     lumbar    Closed head injury with brief loss of consciousness (HCC) 10/15/2019    Contusion of right hand 07/03/2021    Depression     DVT, lower extremity (HCC)     1991 and 2019 now on eliquis    Exercises 5 to 6 times per week     5 days per week with weights/band and also goes to PT 2x/week    Fall down stairs 07/03/2020    Fibromyalgia     Fibromyalgia, primary     GERD (gastroesophageal reflux disease)     Headache(784.0) 2018    I get migraines. See Neurologist for this.     "Hematoma of parietal scalp 07/03/2020    History of Clostridioides difficile infection     History of MRSA infection     pt denies having this    History of recent fall 07/2021    \"possibly injured left shoulder'    History of TIAs     cannot remember details    Hypertension     Hypokalemia     Intractable migraine without aura and without status migrainosus 10/02/2019    Left shoulder pain     \"not too bad\" goes to PT 2x/week    Migraine     Obesity 2019    Psychiatric disorder     Anxiety, major depression, bipolar    Spinal stenosis     Syncope 2014    orthostatic hypotension    Wears dentures     upper    Wears glasses        Past Surgical History:   Procedure Laterality Date    APPENDECTOMY      CAST APPLICATION Left 07/04/2020    Procedure: Application short-arm splint;  Surgeon: Sony Zhang MD;  [facility]: [facility];  Service: Orthopedics    COLONOSCOPY      FL INJECTION LEFT SHOULDER (ARTHROGRAM)  11/10/2021    GASTRIC RESTRICTION SURGERY      Gastric Sleeve Nov 2015    HIP SURGERY  2016    Hip Replacement    HYSTERECTOMY      DONALD    JOINT REPLACEMENT      Left Knee 2011 and Right Hip 2010    KNEE SURGERY  2015    Knee Replacement    OOPHORECTOMY      ORIF WRIST FRACTURE Left 07/04/2020    Procedure: Open reduction and internal fixation left radius and ulnar shaft fracture;  Surgeon: Sony Zhang MD;  Location: [facility];  Service: Orthopedics    NH ARTHROPLASTY GLENOHUMERAL JOINT TOTAL SHOULDER Left 03/30/2021    Procedure: ARTHROPLASTY SHOULDER - anatomic;  Surgeon: Daryn Daniel MD;  [facility]: [facility];  Service: Orthopedics    NH ARTHROPLASTY GLENOHUMERAL JOINT TOTAL SHOULDER Right 10/24/2023    Procedure: ANATOMIC VS REVERSE TOTAL SHOULDER ARTHROPLASTY, WITH BICEPS TENDONESIS;  Surgeon: Daryn Daniel MD;  Location: [facility];  Service: Orthopedics    NH NATIVIDAD SHOULDER ARTHRPLSTY HUMERAL&GLENOID COMPNT Left 12/21/2021    Procedure: REVISION OF TOTAL SHOULDER ARTHROPLASTY TO " REVERSE TOTAL SHOULDER ARTHROPLASTY;  Surgeon: Daryn Daniel MD;  Location[facility];  Service: Orthopedics       Family History   Problem Relation Age of Onset    Breast cancer Mother 37    Migraines Mother     Arthritis Mother     Depression Mother     Cancer Mother     Anxiety disorder Mother     Kidney disease Father     Heart disease Father     Diabetes Father     Arthritis Father     Hypertension Father     Brain cancer Brother     Seizures Brother        Social History     Occupational History    Occupation: retired   Tobacco Use    Smoking status: Former     Current packs/day: 0.00     Average packs/day: 0.2 packs/day for 20.0 years (4.0 ttl pk-yrs)     Types: Cigarettes     Start date: 1998     Quit date: 2008     Years since quittin.8    Smokeless tobacco: Never    Tobacco comments:     quit   Vaping Use    Vaping status: Never Used   Substance and Sexual Activity    Alcohol use: Not Currently     Alcohol/week: 2.0 standard drinks of alcohol     Types: 1 Glasses of wine, 1 Standard drinks or equivalent per week     Comment: occasionally    Drug use: Never     Comment: na    Sexual activity: Not Currently     Partners: Male     Birth control/protection: Abstinence     Comment: defer       Current Outpatient Medications on File Prior to Visit   Medication Sig    amLODIPine (NORVASC) 5 mg tablet Take 1 tablet (5 mg total) by mouth daily    apixaban (Eliquis) 5 mg Take 1 tablet (5 mg total) by mouth 2 (two) times a day    ARIPiprazole (ABILIFY) 5 mg tablet Take 1 tablet (5 mg total) by mouth daily    atorvastatin (LIPITOR) 40 mg tablet TAKE 1 TABLET BY MOUTH EVERY DAY AT NIGHT    folic acid (KP Folic Acid) 1 mg tablet Take 1 tablet (1 mg total) by mouth daily    furosemide (LASIX) 20 mg tablet Take 20 mg by mouth as needed Pt reports calls her [facility] cardiologist Dr Michel before taking    gabapentin (NEURONTIN) 600 MG tablet Take 1 tablet (600 mg total) by mouth 3 (three) times a day     "lamoTRIgine (LaMICtal) 200 MG tablet Take 200 mg by mouth daily.    LORazepam (ATIVAN) 0.5 mg tablet Take 1 tablet (0.5 mg total) by mouth daily as needed for anxiety    meclizine (ANTIVERT) 12.5 MG tablet Take 1 tablet (12.5 mg total) by mouth every 8 (eight) hours as needed for dizziness    methocarbamol (ROBAXIN) 750 mg tablet Take 1 tablet (750 mg total) by mouth every 8 (eight) hours as needed for muscle spasms    omeprazole (PriLOSEC) 40 MG capsule Take 1 capsule (40 mg total) by mouth daily    ondansetron (ZOFRAN-ODT) 4 mg disintegrating tablet TAKE 1 TABLET BY MOUTH EVERY 8 HOURS AS NEEDED FOR NAUSEA AND VOMITING    potassium chloride (K-DUR,KLOR-CON) 20 mEq tablet Take 20 mEq by mouth daily      topiramate (TOPAMAX) 25 mg tablet TAKE 1 TABLET BY MOUTH TWICE A DAY    cholecalciferol (VITAMIN D3) 1,000 units tablet  (Patient not taking: Reported on 10/15/2024)     No current facility-administered medications on file prior to visit.       Allergies   Allergen Reactions    Ketorolac Itching and Other (See Comments)     [toradol] Itching, hives    Mushroom Extract Complex - Food Allergy Hives    Oxycodone Other (See Comments)     Irritable ,severe mood change        Physical Exam:    /75   Pulse 91   Ht 5' 8\" (1.727 m)   Wt 110 kg (243 lb)   BMI 36.95 kg/m²     Constitutional:normal, well developed, well nourished, alert, in no distress and non-toxic and no overt pain behavior.  Eyes:anicteric  HEENT:grossly intact  Neck:supple, symmetric, trachea midline and no masses   Pulmonary:even and unlabored  Cardiovascular:No edema or pitting edema present  Skin:Normal without rashes or lesions and well hydrated  Psychiatric:Mood and affect appropriate  Neurologic:Cranial Nerves II-XII grossly intact  Musculoskeletal:normal    "

## 2024-10-24 ENCOUNTER — HOSPITAL ENCOUNTER (OUTPATIENT)
Dept: INFUSION CENTER | Facility: CLINIC | Age: 64
Discharge: HOME | End: 2024-10-24
Payer: COMMERCIAL

## 2024-10-24 VITALS
SYSTOLIC BLOOD PRESSURE: 136 MMHG | DIASTOLIC BLOOD PRESSURE: 84 MMHG | RESPIRATION RATE: 18 BRPM | TEMPERATURE: 97.7 F | HEART RATE: 86 BPM

## 2024-10-24 DIAGNOSIS — K95.89 IRON DEFICIENCY ANEMIA FOLLOWING BARIATRIC SURGERY: Primary | ICD-10-CM

## 2024-10-24 DIAGNOSIS — D50.8 IRON DEFICIENCY ANEMIA FOLLOWING BARIATRIC SURGERY: Primary | ICD-10-CM

## 2024-10-24 PROCEDURE — 96372 THER/PROPH/DIAG INJ SC/IM: CPT

## 2024-10-24 PROCEDURE — 96365 THER/PROPH/DIAG IV INF INIT: CPT

## 2024-10-24 PROCEDURE — 96367 TX/PROPH/DG ADDL SEQ IV INF: CPT

## 2024-10-24 PROCEDURE — 96375 TX/PRO/DX INJ NEW DRUG ADDON: CPT

## 2024-10-24 RX ORDER — CYANOCOBALAMIN 1000 UG/ML
1000 INJECTION, SOLUTION INTRAMUSCULAR; SUBCUTANEOUS ONCE
Status: COMPLETED | OUTPATIENT
Start: 2024-10-24 | End: 2024-10-24

## 2024-10-24 RX ORDER — DIPHENHYDRAMINE HCL 12.5 MG/5ML
25 SOLUTION ORAL ONCE
Status: CANCELLED
Start: 2024-10-30 | End: 2024-10-30

## 2024-10-24 RX ORDER — SODIUM CHLORIDE 9 MG/ML
20 INJECTION, SOLUTION INTRAVENOUS ONCE
Status: COMPLETED | OUTPATIENT
Start: 2024-10-24 | End: 2024-10-24

## 2024-10-24 RX ORDER — CYANOCOBALAMIN 1000 UG/ML
1000 INJECTION, SOLUTION INTRAMUSCULAR; SUBCUTANEOUS ONCE
Status: CANCELLED | OUTPATIENT
Start: 2024-10-30 | End: 2024-10-30

## 2024-10-24 RX ORDER — DIPHENHYDRAMINE HCL 12.5 MG/5ML
25 SOLUTION ORAL ONCE
Status: COMPLETED | OUTPATIENT
Start: 2024-10-24 | End: 2024-10-24

## 2024-10-24 RX ORDER — SODIUM CHLORIDE 9 MG/ML
20 INJECTION, SOLUTION INTRAVENOUS ONCE
Status: CANCELLED | OUTPATIENT
Start: 2024-10-30

## 2024-10-24 RX ADMIN — SODIUM CHLORIDE 20 ML/HR: 0.9 INJECTION, SOLUTION INTRAVENOUS at 14:08

## 2024-10-24 RX ADMIN — IRON SUCROSE 200 MG: 20 INJECTION, SOLUTION INTRAVENOUS at 14:55

## 2024-10-24 RX ADMIN — FAMOTIDINE 20 MG: 10 INJECTION INTRAVENOUS at 14:13

## 2024-10-24 RX ADMIN — DEXAMETHASONE SODIUM PHOSPHATE 8 MG: 10 INJECTION, SOLUTION INTRAMUSCULAR; INTRAVENOUS at 14:31

## 2024-10-24 RX ADMIN — DIPHENHYDRAMINE HCL ORAL 25 MG: 25 SOLUTION ORAL at 14:13

## 2024-10-24 RX ADMIN — CYANOCOBALAMIN 1000 MCG: 1000 INJECTION, SOLUTION INTRAMUSCULAR at 15:52

## 2024-10-24 NOTE — PROGRESS NOTES
Patient tolerated venofer infusion without incident. Patient declined AVS but she is aware of appointment on 10/30/24 at 1:30pm.

## 2024-10-28 ENCOUNTER — HOSPITAL ENCOUNTER (OUTPATIENT)
Dept: ULTRASOUND IMAGING | Facility: MEDICAL CENTER | Age: 64
Discharge: HOME | End: 2024-10-28
Payer: COMMERCIAL

## 2024-10-28 DIAGNOSIS — R74.8 ELEVATED ALKALINE PHOSPHATASE LEVEL: ICD-10-CM

## 2024-10-28 PROCEDURE — 76705 ECHO EXAM OF ABDOMEN: CPT

## 2024-10-30 ENCOUNTER — HOSPITAL ENCOUNTER (OUTPATIENT)
Dept: INFUSION CENTER | Facility: CLINIC | Age: 64
Discharge: HOME | End: 2024-10-30
Payer: COMMERCIAL

## 2024-10-30 VITALS
SYSTOLIC BLOOD PRESSURE: 118 MMHG | RESPIRATION RATE: 18 BRPM | DIASTOLIC BLOOD PRESSURE: 81 MMHG | HEART RATE: 85 BPM | TEMPERATURE: 97.4 F

## 2024-10-30 DIAGNOSIS — K95.89 IRON DEFICIENCY ANEMIA FOLLOWING BARIATRIC SURGERY: Primary | ICD-10-CM

## 2024-10-30 DIAGNOSIS — D50.8 IRON DEFICIENCY ANEMIA FOLLOWING BARIATRIC SURGERY: Primary | ICD-10-CM

## 2024-10-30 PROCEDURE — 96367 TX/PROPH/DG ADDL SEQ IV INF: CPT

## 2024-10-30 PROCEDURE — 96375 TX/PRO/DX INJ NEW DRUG ADDON: CPT

## 2024-10-30 PROCEDURE — 96372 THER/PROPH/DIAG INJ SC/IM: CPT

## 2024-10-30 PROCEDURE — 96365 THER/PROPH/DIAG IV INF INIT: CPT

## 2024-10-30 RX ORDER — SODIUM CHLORIDE 9 MG/ML
20 INJECTION, SOLUTION INTRAVENOUS ONCE
Status: COMPLETED | OUTPATIENT
Start: 2024-10-30 | End: 2024-10-30

## 2024-10-30 RX ORDER — SODIUM CHLORIDE 9 MG/ML
20 INJECTION, SOLUTION INTRAVENOUS ONCE
Status: CANCELLED | OUTPATIENT
Start: 2024-11-06

## 2024-10-30 RX ORDER — CYANOCOBALAMIN 1000 UG/ML
1000 INJECTION, SOLUTION INTRAMUSCULAR; SUBCUTANEOUS ONCE
Status: CANCELLED | OUTPATIENT
Start: 2024-11-06 | End: 2024-10-31

## 2024-10-30 RX ORDER — DIPHENHYDRAMINE HCL 12.5 MG/5ML
25 SOLUTION ORAL ONCE
Status: CANCELLED
Start: 2024-11-06 | End: 2024-10-31

## 2024-10-30 RX ORDER — DIPHENHYDRAMINE HCL 12.5 MG/5ML
25 SOLUTION ORAL ONCE
Status: COMPLETED | OUTPATIENT
Start: 2024-10-30 | End: 2024-10-30

## 2024-10-30 RX ORDER — CYANOCOBALAMIN 1000 UG/ML
1000 INJECTION, SOLUTION INTRAMUSCULAR; SUBCUTANEOUS ONCE
Status: COMPLETED | OUTPATIENT
Start: 2024-10-30 | End: 2024-10-30

## 2024-10-30 RX ADMIN — DEXAMETHASONE SODIUM PHOSPHATE 8 MG: 10 INJECTION, SOLUTION INTRAMUSCULAR; INTRAVENOUS at 14:13

## 2024-10-30 RX ADMIN — IRON SUCROSE 200 MG: 20 INJECTION, SOLUTION INTRAVENOUS at 14:39

## 2024-10-30 RX ADMIN — FAMOTIDINE 20 MG: 10 INJECTION INTRAVENOUS at 13:52

## 2024-10-30 RX ADMIN — SODIUM CHLORIDE 20 ML/HR: 9 INJECTION, SOLUTION INTRAVENOUS at 13:38

## 2024-10-30 RX ADMIN — DIPHENHYDRAMINE HCL ORAL 25 MG: 25 SOLUTION ORAL at 13:46

## 2024-10-30 RX ADMIN — CYANOCOBALAMIN 1000 MCG: 1000 INJECTION, SOLUTION INTRAMUSCULAR at 13:47

## 2024-10-30 NOTE — PROGRESS NOTES
Patient tolerated venofer infusion without incident. Patient declined AVS but she is aware of appointment on 11/6/24 at 1:30pm.

## 2024-10-31 ENCOUNTER — HOSPITAL ENCOUNTER (OUTPATIENT)
Dept: BONE DENSITY | Facility: MEDICAL CENTER | Age: 64
Discharge: HOME | End: 2024-10-31
Payer: COMMERCIAL

## 2024-10-31 ENCOUNTER — PATIENT MESSAGE (OUTPATIENT)
Dept: HEMATOLOGY ONCOLOGY | Facility: CLINIC | Age: 64
End: 2024-10-31

## 2024-10-31 DIAGNOSIS — K95.89 IRON DEFICIENCY ANEMIA FOLLOWING BARIATRIC SURGERY: Primary | ICD-10-CM

## 2024-10-31 DIAGNOSIS — D50.8 IRON DEFICIENCY ANEMIA FOLLOWING BARIATRIC SURGERY: Primary | ICD-10-CM

## 2024-10-31 DIAGNOSIS — Z78.0 ASYMPTOMATIC MENOPAUSE: ICD-10-CM

## 2024-10-31 DIAGNOSIS — D75.838 SECONDARY THROMBOCYTOSIS: ICD-10-CM

## 2024-10-31 PROCEDURE — 77080 DXA BONE DENSITY AXIAL: CPT

## 2024-11-01 ENCOUNTER — PATIENT OUTREACH (OUTPATIENT)
Dept: CASE MANAGEMENT | Facility: OTHER | Age: 64
End: 2024-11-01

## 2024-11-01 NOTE — PROGRESS NOTES
Chart reviewed:  No ED visits or admissions noted in the past 90 days. She saw her PCP, spine & pain, hematology, & cardiology. She had an injection for sacroiliac joint pain & sacroiliitis, a right upper quadrant abdominal ultrasound - normal, & a dexa scan. She continues to go for routine infusions of Venofer & B12. She has an upcoming echocardiogram & a appointments with podiatry, GI, & cardiology.  Next chart review scheduled.

## 2024-11-06 ENCOUNTER — HOSPITAL ENCOUNTER (OUTPATIENT)
Dept: INFUSION CENTER | Facility: CLINIC | Age: 64
Discharge: HOME | End: 2024-11-06
Payer: COMMERCIAL

## 2024-11-06 ENCOUNTER — TELEPHONE (OUTPATIENT)
Age: 64
End: 2024-11-06

## 2024-11-06 DIAGNOSIS — D50.8 IRON DEFICIENCY ANEMIA FOLLOWING BARIATRIC SURGERY: Primary | ICD-10-CM

## 2024-11-06 DIAGNOSIS — K95.89 IRON DEFICIENCY ANEMIA FOLLOWING BARIATRIC SURGERY: Primary | ICD-10-CM

## 2024-11-06 PROCEDURE — 96365 THER/PROPH/DIAG IV INF INIT: CPT

## 2024-11-06 PROCEDURE — 96372 THER/PROPH/DIAG INJ SC/IM: CPT

## 2024-11-06 PROCEDURE — 96367 TX/PROPH/DG ADDL SEQ IV INF: CPT

## 2024-11-06 RX ORDER — CYANOCOBALAMIN 1000 UG/ML
1000 INJECTION, SOLUTION INTRAMUSCULAR; SUBCUTANEOUS ONCE
Status: COMPLETED | OUTPATIENT
Start: 2024-11-06 | End: 2024-11-06

## 2024-11-06 RX ORDER — DIPHENHYDRAMINE HCL 12.5 MG/5ML
25 SOLUTION ORAL ONCE
Status: COMPLETED | OUTPATIENT
Start: 2024-11-06 | End: 2024-11-06

## 2024-11-06 RX ORDER — SODIUM CHLORIDE 9 MG/ML
20 INJECTION, SOLUTION INTRAVENOUS ONCE
Status: COMPLETED | OUTPATIENT
Start: 2024-11-06 | End: 2024-11-06

## 2024-11-06 RX ORDER — SODIUM CHLORIDE 9 MG/ML
20 INJECTION, SOLUTION INTRAVENOUS ONCE
Status: CANCELLED | OUTPATIENT
Start: 2024-11-06

## 2024-11-06 RX ORDER — DIPHENHYDRAMINE HCL 12.5 MG/5ML
25 SOLUTION ORAL ONCE
Status: CANCELLED
Start: 2024-11-06 | End: 2024-11-06

## 2024-11-06 RX ORDER — CYANOCOBALAMIN 1000 UG/ML
1000 INJECTION, SOLUTION INTRAMUSCULAR; SUBCUTANEOUS ONCE
Status: CANCELLED | OUTPATIENT
Start: 2024-11-06 | End: 2024-11-06

## 2024-11-06 RX ADMIN — IRON SUCROSE 200 MG: 20 INJECTION, SOLUTION INTRAVENOUS at 14:41

## 2024-11-06 RX ADMIN — DEXAMETHASONE SODIUM PHOSPHATE 8 MG: 10 INJECTION, SOLUTION INTRAMUSCULAR; INTRAVENOUS at 14:16

## 2024-11-06 RX ADMIN — CYANOCOBALAMIN 1000 MCG: 1000 INJECTION, SOLUTION INTRAMUSCULAR at 13:57

## 2024-11-06 RX ADMIN — SODIUM CHLORIDE 20 ML/HR: 0.9 INJECTION, SOLUTION INTRAVENOUS at 13:57

## 2024-11-06 RX ADMIN — FAMOTIDINE 20 MG: 10 INJECTION INTRAVENOUS at 13:57

## 2024-11-06 RX ADMIN — DIPHENHYDRAMINE HCL ORAL 25 MG: 25 SOLUTION ORAL at 13:57

## 2024-11-06 NOTE — PROGRESS NOTES
Li Torre  tolerated treatment well with no complications.      Li Torre is aware no future appointments to be scheduled at this time due to completion of treatment plan.    AVS printed and given to iL Torre:   No (Declined by Li Torre)

## 2024-11-08 ENCOUNTER — HOSPITAL ENCOUNTER (OUTPATIENT)
Dept: NON INVASIVE DIAGNOSTICS | Facility: HOSPITAL | Age: 64
Discharge: HOME | End: 2024-11-08
Attending: STUDENT IN AN ORGANIZED HEALTH CARE EDUCATION/TRAINING PROGRAM
Payer: COMMERCIAL

## 2024-11-08 VITALS
HEIGHT: 68 IN | SYSTOLIC BLOOD PRESSURE: 130 MMHG | DIASTOLIC BLOOD PRESSURE: 77 MMHG | WEIGHT: 242 LBS | HEART RATE: 88 BPM | BODY MASS INDEX: 36.68 KG/M2

## 2024-11-08 DIAGNOSIS — I35.0 NONRHEUMATIC AORTIC VALVE STENOSIS: ICD-10-CM

## 2024-11-08 LAB
AORTIC ROOT: 3.4 CM
AORTIC VALVE MEAN VELOCITY: 14.9 M/S
APICAL FOUR CHAMBER EJECTION FRACTION: 52 %
ASCENDING AORTA: 3.7 CM
AV AREA BY CONTINUOUS VTI: 1.8 CM2
AV AREA PEAK VELOCITY: 1.6 CM2
AV LVOT MEAN GRADIENT: 2 MMHG
AV LVOT PEAK GRADIENT: 2 MMHG
AV MEAN GRADIENT: 10 MMHG
AV PEAK GRADIENT: 16 MMHG
AV VALVE AREA: 1.79 CM2
AV VELOCITY RATIO: 0.39
BSA FOR ECHO PROCEDURE: 2.22 M2
DOP CALC AO PEAK VEL: 2.01 M/S
DOP CALC AO VTI: 37.97 CM
DOP CALC LVOT AREA: 4.15 CM2
DOP CALC LVOT CARDIAC INDEX: 2.78 L/MIN/M2
DOP CALC LVOT CARDIAC OUTPUT: 6.18 L/MIN
DOP CALC LVOT DIAMETER: 2.3 CM
DOP CALC LVOT PEAK VEL VTI: 16.37 CM
DOP CALC LVOT PEAK VEL: 0.79 M/S
DOP CALC LVOT STROKE INDEX: 32 ML/M2
DOP CALC LVOT STROKE VOLUME: 67.98
E WAVE DECELERATION TIME: 192 MS
E/A RATIO: 0.79
LAAS-AP2: 21.7 CM2
LAAS-AP4: 19.7 CM2
LEFT ATRIUM SIZE: 4.5 CM
LEFT ATRIUM VOLUME (MOD BIPLANE): 63 ML
LEFT ATRIUM VOLUME INDEX (MOD BIPLANE): 28.4 ML/M2
MV E'TISSUE VEL-LAT: 5 CM/S
MV E'TISSUE VEL-SEP: 8 CM/S
MV PEAK A VEL: 0.96 M/S
MV PEAK E VEL: 76 CM/S
MV STENOSIS PRESSURE HALF TIME: 56 MS
MV VALVE AREA P 1/2 METHOD: 3.93
SL CV LEFT ATRIUM LENGTH A2C: 5.9 CM
SL CV LV EF: 55

## 2024-11-08 PROCEDURE — C8929 TTE W OR WO FOL WCON,DOPPLER: HCPCS

## 2024-11-08 PROCEDURE — 93306 TTE W/DOPPLER COMPLETE: CPT | Performed by: INTERNAL MEDICINE

## 2024-11-08 RX ADMIN — PERFLUTREN 0.4 ML/MIN: 6.52 INJECTION, SUSPENSION INTRAVENOUS at 11:30

## 2024-11-11 ENCOUNTER — OFFICE VISIT (OUTPATIENT)
Dept: PODIATRY | Facility: CLINIC | Age: 64
End: 2024-11-11
Payer: COMMERCIAL

## 2024-11-11 DIAGNOSIS — M20.41 HAMMERTOE OF RIGHT FOOT: ICD-10-CM

## 2024-11-11 PROCEDURE — 99202 OFFICE O/P NEW SF 15 MIN: CPT | Performed by: PODIATRIST

## 2024-11-11 NOTE — ASSESSMENT & PLAN NOTE
Diagnosis and options discussed with patient  Patient agreeable to the plan as stated below  Discussed use of toe crest or budin toe splint along with shoe gear suggestions.  If pain persists consider arthrodesis/arthroplasty hammertoe repair.   Orders:    Ambulatory Referral to Podiatry

## 2024-11-11 NOTE — PROGRESS NOTES
Ambulatory Visit  Name: Li Torre      : 1960      MRN: [id number]  Encounter Provider: Stephane Chapman DPM  Encounter Date: 2024   Encounter department: [facility] PODIATRY [place]    Assessment & Plan  Hammertoe of right foot  Diagnosis and options discussed with patient  Patient agreeable to the plan as stated below  Discussed use of toe crest or budin toe splint along with shoe gear suggestions.  If pain persists consider arthrodesis/arthroplasty hammertoe repair.   Orders:    Ambulatory Referral to Podiatry      History of Present Illness     Li Torre is a 63 y.o. female who presents with crooked toes. Her right toe are hammered and often get caught under her when walking. The deformity is getting worse. She reports neuropathy in her back.       Review of Systems  As stated in HPI, otherwise normal    Medical History Reviewed by provider this encounter:  Tobacco  Allergies  Meds  Problems  Med Hx  Surg Hx  Fam Hx            Objective     There were no vitals taken for this visit.    Physical Exam  Vitals reviewed.   Constitutional:       Appearance: She is obese.   Cardiovascular:      Rate and Rhythm: Normal rate.      Pulses: Normal pulses.   Pulmonary:      Effort: Pulmonary effort is normal. No respiratory distress.   Musculoskeletal:         General: Deformity (rigid pipj contracture right 2,3,4) present.   Skin:     General: Skin is warm.      Capillary Refill: Capillary refill takes less than 2 seconds.      Findings: No erythema or rash.   Neurological:      Mental Status: She is alert.      Sensory: No sensory deficit.

## 2024-11-15 ENCOUNTER — DOCUMENTATION (OUTPATIENT)
Dept: CASE MANAGEMENT | Facility: OTHER | Age: 64
End: 2024-11-15

## 2024-11-15 NOTE — MEDICAL HIGH UTILIZER
High Utilizer Care Plan for: Li Torre  Updated: 2024  Patient Name: Li Torre          MRN: [id number]       : 1960 Age: 63      Sex:  F   Utilization Background: She is a female with a history of migraine, Mosinee's disease, Bipolar, Depression, Fibromyalgia, and HTN who presents to [facility] (mostly [place]) and [facility] with migraine. She has 14 ER visits, 9 admission, and 2 transfers to [facility] for Ketamine Infusions in 2019. Discussed with Neurology reveals that patient does not feel much better even after prolonged hospitalization.       In the last 90 days: 0 encounters    Treatment Recommendations:  ED Recommendations: Recommendations as per neurology: Give migraine protocol: using steroid protocol d/t toradol allergy. Recommend calling her [place] Neurologist to schedule close outpatient follow up. It is noted that the patient will provide other complaints to the ER providers to get admitted and then will complain of migraine in the hospital.     Would not offer transfer for Ketamine Infusion to this patient as it has not worked in the past. This should be left up to Neurology to determine if this is appropriate.       Inpatient Recommendations: Early consultation with neurology. Avoid narcotics/sedating agents due to over sedation in the past       Outpatient recommendations: Needs close follow up with [facility]. Needs CM to make sure that patient does not run out of her meds as she has in the past.      Situation: Patient who presents frequently for migraines. It seems that she has started using other chief complaints to get admitted to the hospital for migraine treatment. As per neurology colleague her headache symptomatology does not usually change with treatment      PMH/PSH:  Migraine, Depression, Bipolar, Fibromyalgia, HTN, Hx of DVT      Assessment                              Drivers of repeated utilization:                 Symptomatology     Community Resources in  place:    None - she has mentioned that she does not have a  for infusions  May need assistance with transportation and with medications   Patient Care Team:   Derrick Vasquez MD - PCP ([facility])  MOON Calle - Psych  MOON Hernandez/Luigi Jones MD - Neurology ([facility])  Altagracia Jade MD - Hematology ([facility])  Guillermo Hyde MD - Cardiology ([facility])  OP Care Manager: Kristi Kelley RN              Care plan date and owner: Terry Duckworth Jr., PA-C 10/31/2019         Reviewed with patient before discharge?

## 2024-11-25 DIAGNOSIS — F31.9 BIPOLAR DEPRESSION (HCC): ICD-10-CM

## 2024-11-26 RX ORDER — LORAZEPAM 0.5 MG/1
0.5 TABLET ORAL DAILY PRN
Qty: 15 TABLET | Refills: 0 | Status: SHIPPED | OUTPATIENT
Start: 2024-11-26

## 2024-12-03 DIAGNOSIS — R42 DIZZINESS: ICD-10-CM

## 2024-12-04 RX ORDER — MECLIZINE HCL 12.5 MG 12.5 MG/1
12.5 TABLET ORAL EVERY 8 HOURS PRN
Qty: 30 TABLET | Refills: 0 | Status: SHIPPED | OUTPATIENT
Start: 2024-12-04

## 2024-12-10 ENCOUNTER — APPOINTMENT (OUTPATIENT)
Dept: LAB | Facility: MEDICAL CENTER | Age: 64
End: 2024-12-10
Payer: COMMERCIAL

## 2024-12-10 DIAGNOSIS — R60.0 BILATERAL LEG EDEMA: ICD-10-CM

## 2024-12-10 DIAGNOSIS — D50.8 IRON DEFICIENCY ANEMIA FOLLOWING BARIATRIC SURGERY: ICD-10-CM

## 2024-12-10 DIAGNOSIS — R74.8 ALKALINE PHOSPHATASE RAISED: ICD-10-CM

## 2024-12-10 DIAGNOSIS — K95.89 IRON DEFICIENCY ANEMIA FOLLOWING BARIATRIC SURGERY: ICD-10-CM

## 2024-12-10 DIAGNOSIS — D75.838 SECONDARY THROMBOCYTOSIS: ICD-10-CM

## 2024-12-10 DIAGNOSIS — E78.2 MIXED HYPERLIPIDEMIA: Chronic | ICD-10-CM

## 2024-12-10 LAB
BASOPHILS # BLD AUTO: 0.06 THOUSANDS/ÂΜL (ref 0–0.1)
BASOPHILS NFR BLD AUTO: 1 % (ref 0–1)
EOSINOPHIL # BLD AUTO: 0.17 THOUSAND/ÂΜL (ref 0–0.61)
EOSINOPHIL NFR BLD AUTO: 2 % (ref 0–6)
ERYTHROCYTE [DISTWIDTH] IN BLOOD BY AUTOMATED COUNT: 15.4 % (ref 11.6–15.1)
HCT VFR BLD AUTO: 39.8 % (ref 34.8–46.1)
HGB BLD-MCNC: 13 G/DL (ref 11.5–15.4)
IMM GRANULOCYTES # BLD AUTO: 0.03 THOUSAND/UL (ref 0–0.2)
IMM GRANULOCYTES NFR BLD AUTO: 0 % (ref 0–2)
LYMPHOCYTES # BLD AUTO: 2.45 THOUSANDS/ÂΜL (ref 0.6–4.47)
LYMPHOCYTES NFR BLD AUTO: 26 % (ref 14–44)
MCH RBC QN AUTO: 31.1 PG (ref 26.8–34.3)
MCHC RBC AUTO-ENTMCNC: 32.7 G/DL (ref 31.4–37.4)
MCV RBC AUTO: 95 FL (ref 82–98)
MONOCYTES # BLD AUTO: 0.88 THOUSAND/ÂΜL (ref 0.17–1.22)
MONOCYTES NFR BLD AUTO: 9 % (ref 4–12)
NEUTROPHILS # BLD AUTO: 5.86 THOUSANDS/ÂΜL (ref 1.85–7.62)
NEUTS SEG NFR BLD AUTO: 62 % (ref 43–75)
NRBC BLD AUTO-RTO: 0 /100 WBCS
PLATELET # BLD AUTO: 375 THOUSANDS/UL (ref 149–390)
PMV BLD AUTO: 9.4 FL (ref 8.9–12.7)
RBC # BLD AUTO: 4.18 MILLION/UL (ref 3.81–5.12)
WBC # BLD AUTO: 9.45 THOUSAND/UL (ref 4.31–10.16)

## 2024-12-10 PROCEDURE — 82977 ASSAY OF GGT: CPT

## 2024-12-10 PROCEDURE — 84080 ASSAY ALKALINE PHOSPHATASES: CPT

## 2024-12-10 PROCEDURE — 36415 COLL VENOUS BLD VENIPUNCTURE: CPT

## 2024-12-10 PROCEDURE — 80061 LIPID PANEL: CPT

## 2024-12-10 PROCEDURE — 84075 ASSAY ALKALINE PHOSPHATASE: CPT

## 2024-12-10 PROCEDURE — 80053 COMPREHEN METABOLIC PANEL: CPT

## 2024-12-10 PROCEDURE — 85025 COMPLETE CBC W/AUTO DIFF WBC: CPT

## 2024-12-11 LAB
ALBUMIN SERPL BCG-MCNC: 3.9 G/DL (ref 3.5–5)
ALP SERPL-CCNC: 191 U/L (ref 34–104)
ALT SERPL W P-5'-P-CCNC: 17 U/L (ref 7–52)
ANION GAP SERPL CALCULATED.3IONS-SCNC: 6 MMOL/L (ref 4–13)
AST SERPL W P-5'-P-CCNC: 19 U/L (ref 13–39)
BILIRUB SERPL-MCNC: 0.5 MG/DL (ref 0.2–1)
BUN SERPL-MCNC: 11 MG/DL (ref 5–25)
CALCIUM SERPL-MCNC: 8.9 MG/DL (ref 8.4–10.2)
CHLORIDE SERPL-SCNC: 104 MMOL/L (ref 96–108)
CHOLEST SERPL-MCNC: 132 MG/DL (ref ?–200)
CO2 SERPL-SCNC: 28 MMOL/L (ref 21–32)
CREAT SERPL-MCNC: 0.83 MG/DL (ref 0.6–1.3)
GFR SERPL CREATININE-BSD FRML MDRD: 73 ML/MIN/1.73SQ M
GGT SERPL-CCNC: 18 U/L (ref 9–64)
GLUCOSE P FAST SERPL-MCNC: 96 MG/DL (ref 65–99)
HDLC SERPL-MCNC: 61 MG/DL
LDLC SERPL CALC-MCNC: 48 MG/DL (ref 0–100)
NONHDLC SERPL-MCNC: 71 MG/DL
POTASSIUM SERPL-SCNC: 4 MMOL/L (ref 3.5–5.3)
PROT SERPL-MCNC: 6.1 G/DL (ref 6.4–8.4)
SODIUM SERPL-SCNC: 138 MMOL/L (ref 135–147)
TRIGL SERPL-MCNC: 115 MG/DL (ref ?–150)

## 2024-12-16 NOTE — PROGRESS NOTES
Cardiology Consultation     Li Torre  [id number]  1960  [facility]  [address] [id number]      1. Primary hypertension        2. Mixed hyperlipidemia        3. History of DVT (deep vein thrombosis)        4. History of pulmonary embolism        5. Orthostatic hypotension        6. Nonrheumatic aortic valve stenosis        7. Bilateral leg edema              Discussion/Summary:  Hypertension  - Continue with amlodipine 5 mg daily  - Nuclear pharmacologic stress test 10/4/2022 at [facility] showed no evidence of inducible ischemia or infarct, LVEF 62%  -Blood pressure is well-controlled today  Lower extremity edema  - prescribed furosemide 20 mg daily as needed  - Patient reports taking Lasix several days a week  - Also prescribed potassium 20 mEq daily as needed but eating a banana instead because she is nervous about taking potassium  Aortic stenosis  - TTE 11/8/2024 showed EF 55%, grade 1 DD, mild LA, mild AS with peak velocity of 2.01 m/s, mean gradient 10 mmHg, DVI 0.39, RAFIA 1.79 cm²  - TTE 7/9/2019 showed EF 60%, grade 1 DD, very mild AS  - Aortic stenosis remains mild, will continue to monitor  Hyperlipidemia  - Continue with atorvastatin 40 mg daily  - Lipid profile 12/10/2024 showed total cholesterol 132, triglycerides 115, HDL 61, LDL 48  DVT/PE  - Anticoagulated on Eliquis 5 mg twice daily  Orthostatic hypotension  -Reports having orthostatic hypotension several years ago, symptoms now resolved  Obesity  - BMI of 36.8  - History of gastric sleeve  Bipolar 2 disorder  Depression  Migraines  Adrenal insufficiency (Phillips's disease)    Follow-up in 1 year.    History of Present Illness:  Li Torre is a 64 y.o. year old female with a past medical history of hypertension, hyperlipidemia, orthostatic hypotension, bipolar 2 disorder, history of DVT/PE, depression, and migraines.    10/23/2024: She reports feeling well today and has no cardiac complaints.  She was  previously following with [facility] cardiology but wanted to switch to have all of her physicians [facility].  She reports her [facility] cardiologist was prescribing her Lasix for her leg swelling and also managing her cholesterol.  Denies any episodes of chest pain, palpitations, shortness of breath, worsening lower extremity edema, other concerning cardiac symptoms at rest or with exertion at this time.    Interval history:  Reports feeling okay today and has no complaints today.  She does acknowledge some intermittent lower extremity edema which has been relatively stable.  She has been taking Lasix a couple times a week to keep her edema under control.  Blood work from from last week showed stable kidney electrolytes as well as controlled lipid panel.  Reviewed blood work with her today.  Denies any exertional chest pain, palpitations, shortness of breath, other concerning cardiac symptoms at this time.    Patient Active Problem List   Diagnosis    Bipolar depression (HCC)    Hypokalemia    Adrenal insufficiency (Andrés's disease) (HCC)    Vertigo    Fibromyalgia    Osteoarthritis of lumbosacral spine without myelopathy    HLD (hyperlipidemia)    Orthostatic hypotension    History of TIAs    History of migraine    Neck pain    Low back pain    Hypertension    Bipolar II disorder (HCC)    Chronic back pain    Anxiety    Leukocytosis    Chronic anticoagulation    Anemia    Ambulatory dysfunction    Edema    History of anxiety    History of pulmonary embolism    Primary osteoarthritis of left shoulder    Status post total replacement of left shoulder    Alkalosis    Primary osteoarthritis of right shoulder    S/P reverse total shoulder arthroplasty, left    Iron deficiency anemia following bariatric surgery    Vitamin B 12 deficiency    Bilateral occipital neuralgia    Fatigue    Anticoagulated by anticoagulation treatment    History of peptic ulcer disease    Hyperglycemia    Hypersomnia     "Intractable vomiting with nausea    Lumbar spondylosis    Migraine aura, persistent    Mild episode of recurrent major depressive disorder (HCC)    Myofascial pain    Peripheral neuropathy    Recurrent falls    Right hip pain    Subclinical hyperthyroidism    Thoracic or lumbosacral neuritis or radiculitis    Weakness of both lower extremities    Postmenopausal    Obesity (BMI 30-39.9)    S/P reverse total shoulder arthroplasty, right    Aftercare following right shoulder joint replacement surgery    IFG (impaired fasting glucose)    Other hyperlipidemia    Obesity, morbid (HCC)    Gastroesophageal reflux disease without esophagitis    RUQ discomfort    Sacroiliitis ([facility])    History of DVT (deep vein thrombosis)    Elevated alkaline phosphatase level    Hammertoe of right foot     Past Medical History:   Diagnosis Date    Adrenal insufficiency (Andrés's disease) ([facility])     Anemia 09 15 2022    Was found in bloodwork done on that day    Anxiety     Arthritis     Bilateral pulmonary contusion 07/03/2020    Bipolar disorder     Cervical radiculopathy     Chest pain     seen in ER 9/18, dx with gas    Chronic back pain     Chronic pain disorder     lumbar    Closed head injury with brief loss of consciousness (HCC) 10/15/2019    Contusion of right hand 07/03/2021    Depression     DVT, lower extremity (HCC)     1991 and 2019 now on eliquis    Exercises 5 to 6 times per week     5 days per week with weights/band and also goes to PT 2x/week    Fall down stairs 07/03/2020    Fibromyalgia     Fibromyalgia, primary     GERD (gastroesophageal reflux disease)     Headache(784.0) 2018    I get migraines. See Neurologist for this.    Hematoma of parietal scalp 07/03/2020    History of Clostridioides difficile infection     History of MRSA infection     pt denies having this    History of recent fall 07/2021    \"possibly injured left shoulder'    History of TIAs     cannot remember details    Hypertension     Hypokalemia     " "Intractable migraine without aura and without status migrainosus 10/02/2019    Left shoulder pain     \"not too bad\" goes to PT 2x/week    Migraine     Obesity 2019    Psychiatric disorder     Anxiety, major depression, bipolar    Spinal stenosis     Syncope 2014    orthostatic hypotension    Wears dentures     upper    Wears glasses      Social History     Socioeconomic History    Marital status: /Civil Union     Spouse name: Not on file    Number of children: Not on file    Years of education: Not on file    Highest education level: Not on file   Occupational History    Occupation: retired   Tobacco Use    Smoking status: Former     Current packs/day: 0.00     Average packs/day: 0.2 packs/day for 20.0 years (4.0 ttl pk-yrs)     Types: Cigarettes     Start date: 1998     Quit date: 2008     Years since quittin.9    Smokeless tobacco: Never    Tobacco comments:     quit   Vaping Use    Vaping status: Never Used   Substance and Sexual Activity    Alcohol use: Not Currently     Alcohol/week: 2.0 standard drinks of alcohol     Types: 1 Glasses of wine, 1 Standard drinks or equivalent per week     Comment: occasionally    Drug use: Never     Comment: na    Sexual activity: Not Currently     Partners: Male     Birth control/protection: Abstinence     Comment: defer   Other Topics Concern    Not on file   Social History Narrative    Not on file     Social Drivers of Health     Financial Resource Strain: Not on file   Food Insecurity: No Food Insecurity (2024)    Nursing - Inadequate Food Risk Classification     Worried About Running Out of Food in the Last Year: Never true     Ran Out of Food in the Last Year: Never true     Ran Out of Food in the Last Year: Not on file   Transportation Needs: No Transportation Needs (2024)    PRAPARE - Transportation     Lack of Transportation (Medical): No     Lack of Transportation (Non-Medical): No   Physical Activity: Not on file   Stress: Not on file "   Social Connections: Not on file   Intimate Partner Violence: Not on file   Housing Stability: Unknown (5/17/2024)    Housing Stability Vital Sign     Unable to Pay for Housing in the Last Year: No     Number of Times Moved in the Last Year: 1     Homeless in the Last Year: Not on file      Family History   Problem Relation Age of Onset    Breast cancer Mother 37    Migraines Mother     Arthritis Mother     Depression Mother     Cancer Mother     Anxiety disorder Mother     Kidney disease Father     Heart disease Father     Diabetes Father     Arthritis Father     Hypertension Father     Brain cancer Brother     Seizures Brother      Past Surgical History:   Procedure Laterality Date    APPENDECTOMY      CAST APPLICATION Left 07/04/2020    Procedure: Application short-arm splint;  Surgeon: Sony Zhang MD;  [facility]: [facility];  Service: Orthopedics    COLONOSCOPY      FL INJECTION LEFT SHOULDER (ARTHROGRAM)  11/10/2021    GASTRIC RESTRICTION SURGERY      Gastric Sleeve Nov 2015    HIP SURGERY  2016    Hip Replacement    HYSTERECTOMY      DONALD    JOINT REPLACEMENT      Left Knee 2011 and Right Hip 2010    KNEE SURGERY  2015    Knee Replacement    OOPHORECTOMY      ORIF WRIST FRACTURE Left 07/04/2020    Procedure: Open reduction and internal fixation left radius and ulnar shaft fracture;  Surgeon: Sony Zhang MD;  [facility]: [facility];  Service: Orthopedics    AK ARTHROPLASTY GLENOHUMERAL JOINT TOTAL SHOULDER Left 03/30/2021    Procedure: ARTHROPLASTY SHOULDER - anatomic;  Surgeon: Daryn Daniel MD;  [facility];  Service: Orthopedics    AK ARTHROPLASTY GLENOHUMERAL JOINT TOTAL SHOULDER Right 10/24/2023    Procedure: ANATOMIC VS REVERSE TOTAL SHOULDER ARTHROPLASTY, WITH BICEPS TENDONESIS;  Surgeon: Daryn Daniel MD;  [facility];  Service: Orthopedics    AK NATIVIDAD SHOULDER ARTHRPLSTY HUMERAL&GLENOID COMPNT Left 12/21/2021    Procedure: REVISION OF TOTAL SHOULDER  ARTHROPLASTY TO REVERSE TOTAL SHOULDER ARTHROPLASTY;  Surgeon: Daryn Daniel MD;  Location: [facility];  Service: Orthopedics       Current Outpatient Medications:     amLODIPine (NORVASC) 5 mg tablet, Take 1 tablet (5 mg total) by mouth daily, Disp: 90 tablet, Rfl: 1    apixaban (Eliquis) 5 mg, Take 1 tablet (5 mg total) by mouth 2 (two) times a day, Disp: 180 tablet, Rfl: 3    ARIPiprazole (ABILIFY) 5 mg tablet, Take 1 tablet (5 mg total) by mouth daily, Disp: 90 tablet, Rfl: 1    atorvastatin (LIPITOR) 40 mg tablet, Take 1 tablet (40 mg total) by mouth daily, Disp: 90 tablet, Rfl: 2    cholecalciferol (VITAMIN D3) 1,000 units tablet, , Disp: , Rfl:     folic acid (KP Folic Acid) 1 mg tablet, Take 1 tablet (1 mg total) by mouth daily, Disp: 90 tablet, Rfl: 1    furosemide (LASIX) 20 mg tablet, Take 1 tablet (20 mg total) by mouth as needed (leg swelling) Pt reports calls her [facility] cardiologist Dr Michel before taking, Disp: 90 tablet, Rfl: 1    gabapentin (NEURONTIN) 600 MG tablet, Take 1 tablet (600 mg total) by mouth 3 (three) times a day, Disp: 270 tablet, Rfl: 1    lamoTRIgine (LaMICtal) 200 MG tablet, Take 200 mg by mouth daily., Disp: , Rfl:     LORazepam (ATIVAN) 0.5 mg tablet, Take 1 tablet (0.5 mg total) by mouth daily as needed for anxiety, Disp: 15 tablet, Rfl: 0    meclizine (ANTIVERT) 12.5 MG tablet, Take 1 tablet (12.5 mg total) by mouth every 8 (eight) hours as needed for dizziness, Disp: 30 tablet, Rfl: 0    omeprazole (PriLOSEC) 40 MG capsule, Take 1 capsule (40 mg total) by mouth daily, Disp: 90 capsule, Rfl: 1    ondansetron (ZOFRAN-ODT) 4 mg disintegrating tablet, TAKE 1 TABLET BY MOUTH EVERY 8 HOURS AS NEEDED FOR NAUSEA AND VOMITING, Disp: 90 tablet, Rfl: 0    potassium chloride (K-DUR,KLOR-CON) 20 mEq tablet, Take 20 mEq by mouth daily  , Disp: , Rfl:     topiramate (TOPAMAX) 25 mg tablet, TAKE 1 TABLET BY MOUTH TWICE A DAY (Patient taking differently: Take 25 mg by mouth daily),  Disp: 180 tablet, Rfl: 1    methocarbamol (ROBAXIN) 750 mg tablet, Take 1 tablet (750 mg total) by mouth every 8 (eight) hours as needed for muscle spasms (Patient not taking: Reported on 12/17/2024), Disp: 30 tablet, Rfl: 0  Allergies   Allergen Reactions    Ketorolac Itching and Other (See Comments)     [toradol] Itching, hives    Mushroom Extract Complex - Food Allergy Hives    Oxycodone Other (See Comments)     Irritable ,severe mood change          Labs:  Lab Results   Component Value Date    ALT 17 12/10/2024    AST 19 12/10/2024    BUN 11 12/10/2024    CALCIUM 8.9 12/10/2024     12/10/2024    CHOL 174 04/05/2015    CO2 28 12/10/2024    CREATININE 0.83 12/10/2024    HDL 61 12/10/2024    HCT 39.8 12/10/2024    HGB 13.0 12/10/2024    HGBA1C 6.1 (H) 09/22/2023    MG 2.1 07/01/2021    PHOS 3.1 09/28/2021     12/10/2024    K 4.0 12/10/2024     07/07/2015    TRIG 115 12/10/2024    WBC 9.45 12/10/2024       Imaging: FL spine and pain procedure    Result Date: 9/25/2024  Narrative: Indication: Low back/buttock pain Preoperative Diagnosis: 1. Sacroiliac Joint Pain 2. Sacroiliitis Postoperative Diagnosis: 1. Sacroiliac Joint Pain 2. Sacroiliitis Procedure: Fluoroscopically-guided injection of the bilateral sacroiliac joints EBL: none Specimens: not applicable After discussing the risks, benefits, and alternatives to the procedure, the patient expressed understanding and wished to proceed. The patient was brought to the fluoroscopy suite and placed in the prone position. Procedural pause conducted to verify: correct patient identity, procedure to be performed and as applicable, correct side and site, correct patient position, and availability of implants, special equipment and special requirements. Using fluoroscopy, the inferior portion of the sacroiliac joint was identified. The skin was sterilely prepped and draped in the usual fashion using Chloraprep skin prep. Using fluoroscopic guidance, a 3.5  inch 25 gauge spinal needle was advanced into joint. Proper needle positioning was confirmed using multiple fluoroscopic views. After negative aspiration, Omnipaque 300 contrast was injected, showing intraarticular spread of contrast without any evidence of intravascular uptake. A 2.5 mL solution consisting of 40 mg of Depo-Medrol in 0.2% ropivacaine was injected slowly and incrementally into the joint. Following the injection, the needle was withdrawn. The procedure was repeated in the exact same way on the opposite side. The patient tolerated the procedure well and there were no apparent complications. After appropriate observation, the patient was dismissed from the clinic in good condition under their own power.      ECG: 10/23/2024: Normal sinus rhythm, borderline LVH    Review of Systems:  Review of Systems   Constitutional:  Negative for chills, diaphoresis, fatigue and fever.   HENT:  Negative for congestion.    Eyes:  Negative for photophobia and visual disturbance.   Respiratory:  Negative for chest tightness and shortness of breath.    Cardiovascular:  Negative for chest pain, palpitations and leg swelling.   Gastrointestinal:  Negative for abdominal distention, abdominal pain, diarrhea, nausea and vomiting.   Genitourinary:  Negative for difficulty urinating and dysuria.   Musculoskeletal:  Negative for arthralgias, gait problem and joint swelling.   Skin:  Negative for color change, pallor and rash.   Neurological:  Negative for dizziness, syncope, numbness and headaches.   Psychiatric/Behavioral:  Negative for agitation, behavioral problems and confusion.          Vitals:    12/17/24 1056   BP: 137/82   Pulse: 81        Vitals:    12/17/24 1056   Weight: 114 kg (251 lb)             Physical Exam:  General appearance:  Appears stated age, alert, well appearing and in no distress  HEENT:  PERRLA, EOMI, no scleral icterus, no conjunctival pallor  NECK:  Supple, No elevated JVP, no thyromegaly, no carotid  bruits  HEART:  Regular rate and rhythm, normal S1/S2, no S3/S4, 2/6 systolic murmur  LUNGS:  Clear to auscultation bilaterally  ABDOMEN:  Soft, non-tender, positive bowel sounds, no rebound or guarding, no organomegaly   EXTREMITIES: Trace bilateral lower extremity edema  VASCULAR:  Normal pedal pulses   SKIN: No lesions or rashes on exposed skin  NEURO:  CN II-XII intact, no focal deficits

## 2024-12-17 ENCOUNTER — OFFICE VISIT (OUTPATIENT)
Dept: CARDIOLOGY CLINIC | Facility: CLINIC | Age: 64
End: 2024-12-17
Payer: COMMERCIAL

## 2024-12-17 VITALS
BODY MASS INDEX: 38.16 KG/M2 | HEART RATE: 81 BPM | DIASTOLIC BLOOD PRESSURE: 82 MMHG | WEIGHT: 251 LBS | SYSTOLIC BLOOD PRESSURE: 137 MMHG

## 2024-12-17 DIAGNOSIS — Z86.718 HISTORY OF DVT (DEEP VEIN THROMBOSIS): ICD-10-CM

## 2024-12-17 DIAGNOSIS — I35.0 NONRHEUMATIC AORTIC VALVE STENOSIS: ICD-10-CM

## 2024-12-17 DIAGNOSIS — Z86.711 HISTORY OF PULMONARY EMBOLISM: ICD-10-CM

## 2024-12-17 DIAGNOSIS — E78.2 MIXED HYPERLIPIDEMIA: ICD-10-CM

## 2024-12-17 DIAGNOSIS — I95.1 ORTHOSTATIC HYPOTENSION: ICD-10-CM

## 2024-12-17 DIAGNOSIS — I10 PRIMARY HYPERTENSION: Primary | ICD-10-CM

## 2024-12-17 DIAGNOSIS — R60.0 BILATERAL LEG EDEMA: ICD-10-CM

## 2024-12-17 PROCEDURE — G2211 COMPLEX E/M VISIT ADD ON: HCPCS | Performed by: STUDENT IN AN ORGANIZED HEALTH CARE EDUCATION/TRAINING PROGRAM

## 2024-12-17 PROCEDURE — 99214 OFFICE O/P EST MOD 30 MIN: CPT | Performed by: STUDENT IN AN ORGANIZED HEALTH CARE EDUCATION/TRAINING PROGRAM

## 2024-12-20 ENCOUNTER — TELEPHONE (OUTPATIENT)
Dept: GASTROENTEROLOGY | Facility: MEDICAL CENTER | Age: 64
End: 2024-12-20

## 2024-12-20 ENCOUNTER — OFFICE VISIT (OUTPATIENT)
Dept: GASTROENTEROLOGY | Facility: MEDICAL CENTER | Age: 64
End: 2024-12-20
Payer: COMMERCIAL

## 2024-12-20 VITALS
DIASTOLIC BLOOD PRESSURE: 82 MMHG | HEART RATE: 91 BPM | HEIGHT: 68 IN | WEIGHT: 251 LBS | BODY MASS INDEX: 38.04 KG/M2 | TEMPERATURE: 98.3 F | SYSTOLIC BLOOD PRESSURE: 137 MMHG | OXYGEN SATURATION: 98 %

## 2024-12-20 DIAGNOSIS — Z86.0100 HISTORY OF COLON POLYPS: Primary | ICD-10-CM

## 2024-12-20 DIAGNOSIS — Z11.59 ENCOUNTER FOR SCREENING FOR OTHER VIRAL DISEASES: ICD-10-CM

## 2024-12-20 DIAGNOSIS — R74.8 ELEVATED ALKALINE PHOSPHATASE LEVEL: ICD-10-CM

## 2024-12-20 LAB
ALP BONE CFR SERPL: 51 % (ref 14–68)
ALP INTEST CFR SERPL: 1 % (ref 0–18)
ALP LIVER CFR SERPL: 48 % (ref 18–85)
ALP SERPL-CCNC: 231 IU/L (ref 44–121)

## 2024-12-20 PROCEDURE — 99204 OFFICE O/P NEW MOD 45 MIN: CPT | Performed by: NURSE PRACTITIONER

## 2024-12-20 RX ORDER — POLYETHYLENE GLYCOL 3350 17 G/17G
238 POWDER, FOR SOLUTION ORAL ONCE
Qty: 238 G | Refills: 0 | Status: SHIPPED | OUTPATIENT
Start: 2024-12-20 | End: 2024-12-20

## 2024-12-20 RX ORDER — BISACODYL 5 MG/1
TABLET, DELAYED RELEASE ORAL
Qty: 4 TABLET | Refills: 0 | Status: SHIPPED | OUTPATIENT
Start: 2024-12-20

## 2024-12-20 NOTE — PROGRESS NOTES
Name: Li Torre      : 1960      MRN: [id number]  Encounter Provider: MOON Leon  Encounter Date: 2024   [facility]ounter [facility]  :  Assessment & Plan  Elevated alkaline phosphatase level  History of elevated alkaline phosphatase since 2018.  Most recent alk phos was 191.  Fractionated alk phos did not confirm bone or liver.  No alcohol use.  Recent ultrasound right upper quadrant showed normal liver.  Obtain full liver serology workup to rule out causes of elevated alkaline phosphatase.    Orders:  •  Ambulatory Referral to Gastroenterology  •  Anti-Mitochondrial M2 Ab (RDL); Future  •  Anti-smooth muscle antibody, IgG; Future  •  EFRAÍN by IFA Reflex for Rheumatologist; Future  •  Ceruloplasmin; Future  •  IgA; Future  •  Celiac Ab tTG DGP TIgA w/Rflx; Future  •  Alpha-1-antitrypsin; Future  •  Chronic Hepatitis Panel; Future  -Follow-up in office after testing  History of colon polyps  BMs are brown formed daily.  Does alternate at times with loose stools and has for many years.  Denies any melena hematochezia.  Reports last colonoscopy was  which noted polyps.  She has not had a colonoscopy since that time.  No family history of any colon cancers or polyps.    Orders:  •  Colonoscopy; Future  •  polyethylene glycol (MiraLax) 17 GM/SCOOP powder; Take 238 g by mouth once for 1 dose Take 238 g my mouth. Use as directed  •  bisacodyl (DULCOLAX) 5 mg EC tablet; Take as directed by GI office    I reviewed with patient/family potential risks of endoscopic evaluation including possible infection, bleeding or perforation.  Patient/family verbalized understanding of potential risks and agreed to procedure(s).      History of Present Illness   HPI  Li Torre is a 64 y.o. female who presents elevated alkaline phosphatase.  She has past medical history of HTN, migraines, GERD, Naranjito's disease, peripheral neuropathy, osteoarthritis,  bipolar disorder, fibromyalgia history of PE on Eliquis.    History of elevated alkaline phosphatase as far back as 2018.  Labs 12/24-CMP normal other than alk phos 191, total protein 6.1.  Other LFTs normal, cholesterol 132, triglycerides 115, HDL 61, LDL 48, iron studies normal.  Fractionated alkaline phosphatase showed normal bone and liver.  No risk factors other than a tattoo many years ago.  No family history of any liver disease.  No alcohol use.    BMs are brown formed daily.  Denies any melena hematochezia.  Reports last colonoscopy was 2012 which noted polyps.  She has not had a colonoscopy since that time.  No family history of any colon cancers or polyps.    Ultrasound right upper quadrant 10/24-normal.  Liver echogenicity and echotexture within normal limits.    Prior EGD/colonoscopy    Colon 2012- polyps.      History obtained from: patient    Review of Systems   All other systems reviewed and are negative.    Medical History Reviewed by provider this encounter:  Tobacco  Allergies  Meds  Problems  Med Hx  Surg Hx  Fam Hx     .  Current Outpatient Medications on File Prior to Visit   Medication Sig Dispense Refill   • amLODIPine (NORVASC) 5 mg tablet Take 1 tablet (5 mg total) by mouth daily 90 tablet 1   • apixaban (Eliquis) 5 mg Take 1 tablet (5 mg total) by mouth 2 (two) times a day 180 tablet 3   • ARIPiprazole (ABILIFY) 5 mg tablet Take 1 tablet (5 mg total) by mouth daily 90 tablet 1   • atorvastatin (LIPITOR) 40 mg tablet Take 1 tablet (40 mg total) by mouth daily 90 tablet 2   • cholecalciferol (VITAMIN D3) 1,000 units tablet      • folic acid ( Folic Acid) 1 mg tablet Take 1 tablet (1 mg total) by mouth daily 90 tablet 1   • furosemide (LASIX) 20 mg tablet Take 1 tablet (20 mg total) by mouth as needed (leg swelling) Pt reports calls her [facility] cardiologist Dr Michel before taking 90 tablet 1   • gabapentin (NEURONTIN) 600 MG tablet Take 1 tablet (600 mg total) by mouth 3 (three) times a  "day 270 tablet 1   • lamoTRIgine (LaMICtal) 200 MG tablet Take 200 mg by mouth daily.     • LORazepam (ATIVAN) 0.5 mg tablet Take 1 tablet (0.5 mg total) by mouth daily as needed for anxiety 15 tablet 0   • meclizine (ANTIVERT) 12.5 MG tablet Take 1 tablet (12.5 mg total) by mouth every 8 (eight) hours as needed for dizziness 30 tablet 0   • omeprazole (PriLOSEC) 40 MG capsule Take 1 capsule (40 mg total) by mouth daily 90 capsule 1   • ondansetron (ZOFRAN-ODT) 4 mg disintegrating tablet TAKE 1 TABLET BY MOUTH EVERY 8 HOURS AS NEEDED FOR NAUSEA AND VOMITING 90 tablet 0   • potassium chloride (K-DUR,KLOR-CON) 20 mEq tablet Take 20 mEq by mouth daily       • topiramate (TOPAMAX) 25 mg tablet TAKE 1 TABLET BY MOUTH TWICE A  tablet 1   • methocarbamol (ROBAXIN) 750 mg tablet Take 1 tablet (750 mg total) by mouth every 8 (eight) hours as needed for muscle spasms (Patient not taking: Reported on 2024) 30 tablet 0     No current facility-administered medications on file prior to visit.      Social History     Tobacco Use   • Smoking status: Former     Current packs/day: 0.00     Average packs/day: 0.2 packs/day for 20.0 years (4.0 ttl pk-yrs)     Types: Cigarettes     Start date: 1998     Quit date: 2008     Years since quittin.9   • Smokeless tobacco: Never   • Tobacco comments:     quit   Vaping Use   • Vaping status: Never Used   Substance and Sexual Activity   • Alcohol use: Not Currently     Alcohol/week: 2.0 standard drinks of alcohol     Types: 1 Glasses of wine, 1 Standard drinks or equivalent per week     Comment: occasionally   • Drug use: Never     Comment: na   • Sexual activity: Not Currently     Partners: Male     Birth control/protection: Abstinence     Comment: defer        Objective   /82 (BP Location: Right arm)   Pulse 91   Temp 98.3 °F (36.8 °C)   Ht 5' 8\" (1.727 m)   Wt 114 kg (251 lb)   SpO2 98%   BMI 38.16 kg/m²      Physical Exam  Constitutional:       " Appearance: Normal appearance.   Cardiovascular:      Rate and Rhythm: Normal rate and regular rhythm.      Heart sounds: Normal heart sounds.   Pulmonary:      Breath sounds: Normal breath sounds.   Abdominal:      General: Bowel sounds are normal.      Palpations: Abdomen is soft.      Tenderness: There is no abdominal tenderness.   Skin:     General: Skin is warm and dry.   Neurological:      Mental Status: She is alert and oriented to person, place, and time.

## 2024-12-20 NOTE — H&P (VIEW-ONLY)
Name: Li Torre      : 1960      MRN: [id number]  Encounter Provider: MOON Leon  Encounter Date: 2024   Encounter department: [facility]  :  Assessment & Plan  Elevated alkaline phosphatase level  History of elevated alkaline phosphatase since 2018.  Most recent alk phos was 191.  Fractionated alk phos did not confirm bone or liver.  No alcohol use.  Recent ultrasound right upper quadrant showed normal liver.  Obtain full liver serology workup to rule out causes of elevated alkaline phosphatase.    Orders:  •  Ambulatory Referral to Gastroenterology  •  Anti-Mitochondrial M2 Ab (RDL); Future  •  Anti-smooth muscle antibody, IgG; Future  •  EFRAÍN by IFA Reflex for Rheumatologist; Future  •  Ceruloplasmin; Future  •  IgA; Future  •  Celiac Ab tTG DGP TIgA w/Rflx; Future  •  Alpha-1-antitrypsin; Future  •  Chronic Hepatitis Panel; Future  -Follow-up in office after testing  History of colon polyps  BMs are brown formed daily.  Does alternate at times with loose stools and has for many years.  Denies any melena hematochezia.  Reports last colonoscopy was  which noted polyps.  She has not had a colonoscopy since that time.  No family history of any colon cancers or polyps.    Orders:  •  Colonoscopy; Future  •  polyethylene glycol (MiraLax) 17 GM/SCOOP powder; Take 238 g by mouth once for 1 dose Take 238 g my mouth. Use as directed  •  bisacodyl (DULCOLAX) 5 mg EC tablet; Take as directed by GI office    I reviewed with patient/family potential risks of endoscopic evaluation including possible infection, bleeding or perforation.  Patient/family verbalized understanding of potential risks and agreed to procedure(s).      History of Present Illness   HPI  iL Torre is a 64 y.o. female who presents elevated alkaline phosphatase.  She has past medical history of HTN, migraines, GERD, Juab's disease, peripheral neuropathy, osteoarthritis,  bipolar disorder, fibromyalgia history of PE on Eliquis.    History of elevated alkaline phosphatase as far back as 2018.  Labs 12/24-CMP normal other than alk phos 191, total protein 6.1.  Other LFTs normal, cholesterol 132, triglycerides 115, HDL 61, LDL 48, iron studies normal.  Fractionated alkaline phosphatase showed normal bone and liver.  No risk factors other than a tattoo many years ago.  No family history of any liver disease.  No alcohol use.    BMs are brown formed daily.  Denies any melena hematochezia.  Reports last colonoscopy was 2012 which noted polyps.  She has not had a colonoscopy since that time.  No family history of any colon cancers or polyps.    Ultrasound right upper quadrant 10/24-normal.  Liver echogenicity and echotexture within normal limits.    Prior EGD/colonoscopy    Colon 2012- polyps.      History obtained from: patient    Review of Systems   All other systems reviewed and are negative.    Medical History Reviewed by provider this encounter:  Tobacco  Allergies  Meds  Problems  Med Hx  Surg Hx  Fam Hx     .  Current Outpatient Medications on File Prior to Visit   Medication Sig Dispense Refill   • amLODIPine (NORVASC) 5 mg tablet Take 1 tablet (5 mg total) by mouth daily 90 tablet 1   • apixaban (Eliquis) 5 mg Take 1 tablet (5 mg total) by mouth 2 (two) times a day 180 tablet 3   • ARIPiprazole (ABILIFY) 5 mg tablet Take 1 tablet (5 mg total) by mouth daily 90 tablet 1   • atorvastatin (LIPITOR) 40 mg tablet Take 1 tablet (40 mg total) by mouth daily 90 tablet 2   • cholecalciferol (VITAMIN D3) 1,000 units tablet      • folic acid ( Folic Acid) 1 mg tablet Take 1 tablet (1 mg total) by mouth daily 90 tablet 1   • furosemide (LASIX) 20 mg tablet Take 1 tablet (20 mg total) by mouth as needed (leg swelling) Pt reports calls her [facility] cardiologist Dr Michel before taking 90 tablet 1   • gabapentin (NEURONTIN) 600 MG tablet Take 1 tablet (600 mg total) by mouth 3 (three) times a  "day 270 tablet 1   • lamoTRIgine (LaMICtal) 200 MG tablet Take 200 mg by mouth daily.     • LORazepam (ATIVAN) 0.5 mg tablet Take 1 tablet (0.5 mg total) by mouth daily as needed for anxiety 15 tablet 0   • meclizine (ANTIVERT) 12.5 MG tablet Take 1 tablet (12.5 mg total) by mouth every 8 (eight) hours as needed for dizziness 30 tablet 0   • omeprazole (PriLOSEC) 40 MG capsule Take 1 capsule (40 mg total) by mouth daily 90 capsule 1   • ondansetron (ZOFRAN-ODT) 4 mg disintegrating tablet TAKE 1 TABLET BY MOUTH EVERY 8 HOURS AS NEEDED FOR NAUSEA AND VOMITING 90 tablet 0   • potassium chloride (K-DUR,KLOR-CON) 20 mEq tablet Take 20 mEq by mouth daily       • topiramate (TOPAMAX) 25 mg tablet TAKE 1 TABLET BY MOUTH TWICE A  tablet 1   • methocarbamol (ROBAXIN) 750 mg tablet Take 1 tablet (750 mg total) by mouth every 8 (eight) hours as needed for muscle spasms (Patient not taking: Reported on 2024) 30 tablet 0     No current facility-administered medications on file prior to visit.      Social History     Tobacco Use   • Smoking status: Former     Current packs/day: 0.00     Average packs/day: 0.2 packs/day for 20.0 years (4.0 ttl pk-yrs)     Types: Cigarettes     Start date: 1998     Quit date: 2008     Years since quittin.9   • Smokeless tobacco: Never   • Tobacco comments:     quit   Vaping Use   • Vaping status: Never Used   Substance and Sexual Activity   • Alcohol use: Not Currently     Alcohol/week: 2.0 standard drinks of alcohol     Types: 1 Glasses of wine, 1 Standard drinks or equivalent per week     Comment: occasionally   • Drug use: Never     Comment: na   • Sexual activity: Not Currently     Partners: Male     Birth control/protection: Abstinence     Comment: defer        Objective   /82 (BP Location: Right arm)   Pulse 91   Temp 98.3 °F (36.8 °C)   Ht 5' 8\" (1.727 m)   Wt 114 kg (251 lb)   SpO2 98%   BMI 38.16 kg/m²      Physical Exam  Constitutional:       " Appearance: Normal appearance.   Cardiovascular:      Rate and Rhythm: Normal rate and regular rhythm.      Heart sounds: Normal heart sounds.   Pulmonary:      Breath sounds: Normal breath sounds.   Abdominal:      General: Bowel sounds are normal.      Palpations: Abdomen is soft.      Tenderness: There is no abdominal tenderness.   Skin:     General: Skin is warm and dry.   Neurological:      Mental Status: She is alert and oriented to person, place, and time.

## 2024-12-20 NOTE — ASSESSMENT & PLAN NOTE
History of elevated alkaline phosphatase since 2018.  Most recent alk phos was 191.  Fractionated alk phos did not confirm bone or liver.  No alcohol use.  Recent ultrasound right upper quadrant showed normal liver.  Obtain full liver serology workup to rule out causes of elevated alkaline phosphatase.    Orders:  •  Ambulatory Referral to Gastroenterology  •  Anti-Mitochondrial M2 Ab (RDL); Future  •  Anti-smooth muscle antibody, IgG; Future  •  EFRAÍN by IFA Reflex for Rheumatologist; Future  •  Ceruloplasmin; Future  •  IgA; Future  •  Celiac Ab tTG DGP TIgA w/Rflx; Future  •  Alpha-1-antitrypsin; Future  •  Chronic Hepatitis Panel; Future  -Follow-up in office after testing

## 2024-12-20 NOTE — TELEPHONE ENCOUNTER
Procedure: Colonoscopy  Date: 1/9/25  Physician performing: Dr. Whittington  Location of procedure:  [facility]  Instructions given to patient: Miralax  Diabetic: No  Clearances: Yes-Eliquis hold request needed

## 2024-12-20 NOTE — TELEPHONE ENCOUNTER
Our mutual patient, Li Torre, is scheduled for the following   procedure: Colonoscopy   On: 1/9/25   With: Dr. Whittington    Li Torre is taking the following blood thinner: Eliquis       Can this be stopped 2 days prior to the procedure?         Thank you,  Dean PALMA  [facility]'s Gastroenterology

## 2024-12-23 ENCOUNTER — TELEPHONE (OUTPATIENT)
Dept: GASTROENTEROLOGY | Facility: MEDICAL CENTER | Age: 64
End: 2024-12-23

## 2024-12-23 NOTE — TELEPHONE ENCOUNTER
Left pt VM informing her that she needs to hold her eliquis 2 days prior to her procedure and requested call back if the pt has any further questions.

## 2024-12-30 ENCOUNTER — TELEPHONE (OUTPATIENT)
Dept: GASTROENTEROLOGY | Facility: MEDICAL CENTER | Age: 64
End: 2024-12-30

## 2024-12-30 DIAGNOSIS — F31.9 BIPOLAR DEPRESSION (HCC): ICD-10-CM

## 2024-12-30 NOTE — TELEPHONE ENCOUNTER
Confirming Upcoming Procedure: Colonoscopy on January 9  Physician performing: Dr. Whittington  Location of procedure:  [facility]  Prep: Miralax  Diabetic:  No      Eliquis

## 2024-12-31 RX ORDER — LORAZEPAM 0.5 MG/1
0.5 TABLET ORAL DAILY PRN
Qty: 15 TABLET | Refills: 0 | Status: SHIPPED | OUTPATIENT
Start: 2024-12-31

## 2025-01-03 ENCOUNTER — ANESTHESIA EVENT (OUTPATIENT)
Dept: ANESTHESIOLOGY | Facility: HOSPITAL | Age: 65
End: 2025-01-03

## 2025-01-03 ENCOUNTER — ANESTHESIA (OUTPATIENT)
Dept: ANESTHESIOLOGY | Facility: HOSPITAL | Age: 65
End: 2025-01-03

## 2025-01-09 ENCOUNTER — ANESTHESIA EVENT (OUTPATIENT)
Dept: GASTROENTEROLOGY | Facility: MEDICAL CENTER | Age: 65
End: 2025-01-09
Payer: COMMERCIAL

## 2025-01-09 ENCOUNTER — HOSPITAL ENCOUNTER (OUTPATIENT)
Dept: GASTROENTEROLOGY | Facility: MEDICAL CENTER | Age: 65
Setting detail: OUTPATIENT SURGERY
End: 2025-01-09
Payer: COMMERCIAL

## 2025-01-09 ENCOUNTER — ANESTHESIA (OUTPATIENT)
Dept: GASTROENTEROLOGY | Facility: MEDICAL CENTER | Age: 65
End: 2025-01-09
Payer: COMMERCIAL

## 2025-01-09 VITALS
WEIGHT: 248 LBS | RESPIRATION RATE: 16 BRPM | HEART RATE: 79 BPM | TEMPERATURE: 97 F | OXYGEN SATURATION: 99 % | SYSTOLIC BLOOD PRESSURE: 135 MMHG | DIASTOLIC BLOOD PRESSURE: 64 MMHG | BODY MASS INDEX: 37.71 KG/M2

## 2025-01-09 DIAGNOSIS — Z86.0100 HISTORY OF COLON POLYPS: ICD-10-CM

## 2025-01-09 PROCEDURE — 45380 COLONOSCOPY AND BIOPSY: CPT | Performed by: STUDENT IN AN ORGANIZED HEALTH CARE EDUCATION/TRAINING PROGRAM

## 2025-01-09 PROCEDURE — 45385 COLONOSCOPY W/LESION REMOVAL: CPT | Performed by: STUDENT IN AN ORGANIZED HEALTH CARE EDUCATION/TRAINING PROGRAM

## 2025-01-09 PROCEDURE — 88305 TISSUE EXAM BY PATHOLOGIST: CPT | Performed by: SPECIALIST

## 2025-01-09 RX ORDER — PROPOFOL 10 MG/ML
INJECTION, EMULSION INTRAVENOUS AS NEEDED
Status: DISCONTINUED | OUTPATIENT
Start: 2025-01-09 | End: 2025-01-09

## 2025-01-09 RX ORDER — SODIUM CHLORIDE 9 MG/ML
125 INJECTION, SOLUTION INTRAVENOUS CONTINUOUS
Status: DISCONTINUED | OUTPATIENT
Start: 2025-01-09 | End: 2025-01-13 | Stop reason: HOSPADM

## 2025-01-09 RX ORDER — SODIUM CHLORIDE 9 MG/ML
125 INJECTION, SOLUTION INTRAVENOUS CONTINUOUS
Status: CANCELLED | OUTPATIENT
Start: 2025-01-09

## 2025-01-09 RX ADMIN — PROPOFOL 30 MG: 10 INJECTION, EMULSION INTRAVENOUS at 09:38

## 2025-01-09 RX ADMIN — PROPOFOL 30 MG: 10 INJECTION, EMULSION INTRAVENOUS at 09:53

## 2025-01-09 RX ADMIN — PROPOFOL 100 MG: 10 INJECTION, EMULSION INTRAVENOUS at 09:29

## 2025-01-09 RX ADMIN — PROPOFOL 20 MG: 10 INJECTION, EMULSION INTRAVENOUS at 09:56

## 2025-01-09 RX ADMIN — SODIUM CHLORIDE 125 ML/HR: 0.9 INJECTION, SOLUTION INTRAVENOUS at 09:15

## 2025-01-09 RX ADMIN — PROPOFOL 30 MG: 10 INJECTION, EMULSION INTRAVENOUS at 09:47

## 2025-01-09 RX ADMIN — PROPOFOL 30 MG: 10 INJECTION, EMULSION INTRAVENOUS at 09:44

## 2025-01-09 RX ADMIN — PROPOFOL 30 MG: 10 INJECTION, EMULSION INTRAVENOUS at 09:31

## 2025-01-09 RX ADMIN — PROPOFOL 30 MG: 10 INJECTION, EMULSION INTRAVENOUS at 09:42

## 2025-01-09 RX ADMIN — PROPOFOL 30 MG: 10 INJECTION, EMULSION INTRAVENOUS at 09:59

## 2025-01-09 RX ADMIN — PROPOFOL 30 MG: 10 INJECTION, EMULSION INTRAVENOUS at 10:03

## 2025-01-09 RX ADMIN — PROPOFOL 30 MG: 10 INJECTION, EMULSION INTRAVENOUS at 09:33

## 2025-01-09 RX ADMIN — PROPOFOL 30 MG: 10 INJECTION, EMULSION INTRAVENOUS at 09:40

## 2025-01-09 RX ADMIN — PROPOFOL 30 MG: 10 INJECTION, EMULSION INTRAVENOUS at 09:50

## 2025-01-09 NOTE — ANESTHESIA PREPROCEDURE EVALUATION
Procedure:  COLONOSCOPY    Relevant Problems   CARDIO   (+) HLD (hyperlipidemia)   (+) Hypertension   (+) Migraine aura, persistent   (+) Other hyperlipidemia      ENDO   (+) Adrenal insufficiency (Andrés's disease) (HCC)   (+) Subclinical hyperthyroidism      GI/HEPATIC   (+) Gastroesophageal reflux disease without esophagitis      HEMATOLOGY   (+) Anemia   (+) Iron deficiency anemia following bariatric surgery      MUSCULOSKELETAL   (+) Chronic back pain   (+) Fibromyalgia   (+) Low back pain   (+) Lumbar spondylosis   (+) Osteoarthritis of lumbosacral spine without myelopathy   (+) Primary osteoarthritis of left shoulder   (+) Primary osteoarthritis of right shoulder      NEURO/PSYCH   (+) Anxiety   (+) Bilateral occipital neuralgia   (+) Chronic back pain   (+) Fibromyalgia   (+) Migraine aura, persistent   (+) Mild episode of recurrent major depressive disorder (HCC)   (+) Weakness of both lower extremities      H/o stroke    Physical Exam    Airway    Mallampati score: II  TM Distance: >3 FB  Neck ROM: full     Dental       Cardiovascular  Cardiovascular exam normal    Pulmonary  Pulmonary exam normal     Other Findings  post-pubertal.      Anesthesia Plan  ASA Score- 3     Anesthesia Type- IV sedation with anesthesia with ASA Monitors.         Additional Monitors:     Airway Plan:     Comment: + PNB.       Plan Factors-    Chart reviewed. EKG reviewed.  Existing labs reviewed. Patient summary reviewed.                  Induction- intravenous.    Postoperative Plan-         Informed Consent- Anesthetic plan and risks discussed with patient.  I personally reviewed this patient with the CRNA. Discussed and agreed on the Anesthesia Plan with the CRNA..

## 2025-01-09 NOTE — ANESTHESIA POSTPROCEDURE EVALUATION
Post-Op Assessment Note    CV Status:  Stable  Pain Score: 0    Pain management: adequate       Mental Status:  Alert and awake   Hydration Status:  Euvolemic   PONV Controlled:  Controlled   Airway Patency:  Patent     Post Op Vitals Reviewed: Yes    No anethesia notable event occurred.    Staff: Anesthesiologist, CRNA           Last Filed PACU Vitals:  Vitals Value Taken Time   Temp     Pulse 79 01/09/25 1012   /55 01/09/25 1012   Resp 14 01/09/25 1012   SpO2 97 % 01/09/25 1012       Modified Anum:     Vitals Value Taken Time   Activity 2 01/09/25 1012   Respiration 2 01/09/25 1012   Circulation 2 01/09/25 1012   Consciousness 2 01/09/25 1012   Oxygen Saturation 2 01/09/25 1012     Modified Anum Score: 10

## 2025-01-13 PROCEDURE — 88305 TISSUE EXAM BY PATHOLOGIST: CPT | Performed by: SPECIALIST

## 2025-01-15 ENCOUNTER — APPOINTMENT (OUTPATIENT)
Age: 65
End: 2025-01-15
Payer: COMMERCIAL

## 2025-01-15 ENCOUNTER — OFFICE VISIT (OUTPATIENT)
Age: 65
End: 2025-01-15
Payer: COMMERCIAL

## 2025-01-15 VITALS
SYSTOLIC BLOOD PRESSURE: 148 MMHG | WEIGHT: 248 LBS | BODY MASS INDEX: 37.59 KG/M2 | HEIGHT: 68 IN | OXYGEN SATURATION: 98 % | RESPIRATION RATE: 16 BRPM | DIASTOLIC BLOOD PRESSURE: 90 MMHG | TEMPERATURE: 97.8 F | HEART RATE: 74 BPM

## 2025-01-15 DIAGNOSIS — M46.1 SACROILIITIS (HCC): ICD-10-CM

## 2025-01-15 DIAGNOSIS — K95.89 IRON DEFICIENCY ANEMIA FOLLOWING BARIATRIC SURGERY: ICD-10-CM

## 2025-01-15 DIAGNOSIS — E66.01 SEVERE OBESITY (BMI 35.0-39.9) WITH COMORBIDITY (HCC): ICD-10-CM

## 2025-01-15 DIAGNOSIS — E27.1 ADRENAL INSUFFICIENCY (ADDISON'S DISEASE) (HCC): ICD-10-CM

## 2025-01-15 DIAGNOSIS — Z12.31 ENCOUNTER FOR SCREENING MAMMOGRAM FOR MALIGNANT NEOPLASM OF BREAST: ICD-10-CM

## 2025-01-15 DIAGNOSIS — R74.8 ELEVATED ALKALINE PHOSPHATASE LEVEL: ICD-10-CM

## 2025-01-15 DIAGNOSIS — D53.1 OTHER MEGALOBLASTIC ANEMIAS, NOT ELSEWHERE CLASSIFIED: ICD-10-CM

## 2025-01-15 DIAGNOSIS — F31.81 BIPOLAR II DISORDER (HCC): Primary | ICD-10-CM

## 2025-01-15 DIAGNOSIS — D50.8 IRON DEFICIENCY ANEMIA FOLLOWING BARIATRIC SURGERY: ICD-10-CM

## 2025-01-15 DIAGNOSIS — Z11.59 ENCOUNTER FOR SCREENING FOR OTHER VIRAL DISEASES: ICD-10-CM

## 2025-01-15 LAB
BASOPHILS # BLD AUTO: 0.07 THOUSANDS/ΜL (ref 0–0.1)
BASOPHILS NFR BLD AUTO: 1 % (ref 0–1)
EOSINOPHIL # BLD AUTO: 0.15 THOUSAND/ΜL (ref 0–0.61)
EOSINOPHIL NFR BLD AUTO: 1 % (ref 0–6)
ERYTHROCYTE [DISTWIDTH] IN BLOOD BY AUTOMATED COUNT: 14.2 % (ref 11.6–15.1)
FERRITIN SERPL-MCNC: 210 NG/ML (ref 11–307)
FOLATE SERPL-MCNC: 4.6 NG/ML
HCT VFR BLD AUTO: 38.9 % (ref 34.8–46.1)
HGB BLD-MCNC: 12.8 G/DL (ref 11.5–15.4)
IGA SERPL-MCNC: 351 MG/DL (ref 66–433)
IMM GRANULOCYTES # BLD AUTO: 0.04 THOUSAND/UL (ref 0–0.2)
IMM GRANULOCYTES NFR BLD AUTO: 0 % (ref 0–2)
IRON SATN MFR SERPL: 23 % (ref 15–50)
IRON SERPL-MCNC: 74 UG/DL (ref 50–212)
LYMPHOCYTES # BLD AUTO: 2.52 THOUSANDS/ΜL (ref 0.6–4.47)
LYMPHOCYTES NFR BLD AUTO: 23 % (ref 14–44)
MCH RBC QN AUTO: 31.5 PG (ref 26.8–34.3)
MCHC RBC AUTO-ENTMCNC: 32.9 G/DL (ref 31.4–37.4)
MCV RBC AUTO: 96 FL (ref 82–98)
MONOCYTES # BLD AUTO: 0.81 THOUSAND/ΜL (ref 0.17–1.22)
MONOCYTES NFR BLD AUTO: 7 % (ref 4–12)
NEUTROPHILS # BLD AUTO: 7.3 THOUSANDS/ΜL (ref 1.85–7.62)
NEUTS SEG NFR BLD AUTO: 68 % (ref 43–75)
NRBC BLD AUTO-RTO: 0 /100 WBCS
PLATELET # BLD AUTO: 407 THOUSANDS/UL (ref 149–390)
PMV BLD AUTO: 9.7 FL (ref 8.9–12.7)
RBC # BLD AUTO: 4.06 MILLION/UL (ref 3.81–5.12)
TIBC SERPL-MCNC: 327.6 UG/DL (ref 250–450)
TRANSFERRIN SERPL-MCNC: 234 MG/DL (ref 203–362)
UIBC SERPL-MCNC: 254 UG/DL (ref 155–355)
WBC # BLD AUTO: 10.89 THOUSAND/UL (ref 4.31–10.16)

## 2025-01-15 PROCEDURE — 36415 COLL VENOUS BLD VENIPUNCTURE: CPT

## 2025-01-15 PROCEDURE — 87340 HEPATITIS B SURFACE AG IA: CPT

## 2025-01-15 PROCEDURE — G2211 COMPLEX E/M VISIT ADD ON: HCPCS | Performed by: FAMILY MEDICINE

## 2025-01-15 PROCEDURE — 82390 ASSAY OF CERULOPLASMIN: CPT

## 2025-01-15 PROCEDURE — 82746 ASSAY OF FOLIC ACID SERUM: CPT

## 2025-01-15 PROCEDURE — 86704 HEP B CORE ANTIBODY TOTAL: CPT

## 2025-01-15 PROCEDURE — 82784 ASSAY IGA/IGD/IGG/IGM EACH: CPT

## 2025-01-15 PROCEDURE — 86015 ACTIN ANTIBODY EACH: CPT

## 2025-01-15 PROCEDURE — 99214 OFFICE O/P EST MOD 30 MIN: CPT | Performed by: FAMILY MEDICINE

## 2025-01-15 PROCEDURE — 83540 ASSAY OF IRON: CPT

## 2025-01-15 PROCEDURE — 83550 IRON BINDING TEST: CPT

## 2025-01-15 PROCEDURE — 86803 HEPATITIS C AB TEST: CPT

## 2025-01-15 PROCEDURE — 86258 DGP ANTIBODY EACH IG CLASS: CPT

## 2025-01-15 PROCEDURE — 86364 TISS TRNSGLTMNASE EA IG CLAS: CPT

## 2025-01-15 PROCEDURE — 86038 ANTINUCLEAR ANTIBODIES: CPT

## 2025-01-15 PROCEDURE — 86381 MITOCHONDRIAL ANTIBODY EACH: CPT

## 2025-01-15 PROCEDURE — 82728 ASSAY OF FERRITIN: CPT

## 2025-01-15 PROCEDURE — 82607 VITAMIN B-12: CPT

## 2025-01-15 PROCEDURE — 86705 HEP B CORE ANTIBODY IGM: CPT

## 2025-01-15 PROCEDURE — 85025 COMPLETE CBC W/AUTO DIFF WBC: CPT

## 2025-01-15 PROCEDURE — 82103 ALPHA-1-ANTITRYPSIN TOTAL: CPT

## 2025-01-15 RX ORDER — LAMOTRIGINE 200 MG/1
200 TABLET ORAL DAILY
Qty: 90 TABLET | Refills: 1 | Status: SHIPPED | OUTPATIENT
Start: 2025-01-15

## 2025-01-16 LAB
GLIADIN IGG SER IA-ACNC: <1.4 U/ML (ref ?–10)
GLIADIN PEPTIDE IGA SER IA-ACNC: 1.2 U/ML (ref ?–10)
HBV CORE AB SER QL: NORMAL
HBV CORE IGM SER QL: NORMAL
HBV SURFACE AG SER QL: NORMAL
HCV AB SER QL: NORMAL
TTG IGA SER IA-ACNC: 0.9 U/ML (ref ?–10)
TTG IGG SER IA-ACNC: <1.7 U/ML (ref ?–10)
VIT B12 SERPL-MCNC: 2643 PG/ML (ref 180–914)

## 2025-01-17 PROBLEM — E66.01 SEVERE OBESITY (BMI 35.0-39.9) WITH COMORBIDITY (HCC): Status: ACTIVE | Noted: 2025-01-17

## 2025-01-17 LAB
A1AT SERPL-MCNC: 179 MG/DL (ref 101–187)
ACTIN IGG SERPL-ACNC: 3 UNITS (ref 0–19)
ANA SER QL IF: NEGATIVE
CERULOPLASMIN SERPL-MCNC: 17.9 MG/DL (ref 19–39)
MITOCHONDRIA M2 IGG SER-ACNC: <20 UNITS (ref 0–20)

## 2025-01-17 NOTE — PROGRESS NOTES
"Name: Li Torre      : 1960      MRN: [id number]  Encounter Provider: Derrick Vasquez MD  Encounter Date: 1/15/2025   Encounter department: [facility] PRIMARY CARE  :  Assessment & Plan  Bipolar II disorder (HCC)  Stable, continue meds. Discussed supportive care and return parameters.   Orders:    lamoTRIgine (LaMICtal) 200 MG tablet; Take 1 tablet (200 mg total) by mouth daily    Adrenal insufficiency (Andrés's disease) (HCC)  Discussed supportive care and return parameters.        Sacroiliitis (HCC)  Stable, continue meds. Discussed supportive care and return parameters.        Severe obesity (BMI 35.0-39.9) with comorbidity (HCC)    Discussed supportive care and return parameters.        Encounter for screening mammogram for malignant neoplasm of breast    Orders:    Mammo screening bilateral w 3d and cad; Future           History of Present Illness     Patient is a 65 y/o woman who presents for follow-up on bipolar d/o, adrenal insufficiency, severe obesity with comorbidity, sacroiliitis. Pt admits being stable on meds. And denies acute complaints no fevers chills nausea or vomiting.      Review of Systems   Constitutional: Negative.    HENT: Negative.     Eyes: Negative.    Respiratory: Negative.     Cardiovascular: Negative.    Gastrointestinal: Negative.    Endocrine: Negative.    Genitourinary: Negative.    Musculoskeletal: Negative.    Allergic/Immunologic: Negative.    Neurological: Negative.    Hematological: Negative.    Psychiatric/Behavioral: Negative.     All other systems reviewed and are negative.      Objective   /90 (BP Location: Left arm, Patient Position: Sitting, Cuff Size: Large)   Pulse 74   Temp 97.8 °F (36.6 °C) (Temporal)   Resp 16   Ht 5' 8\" (1.727 m)   Wt 112 kg (248 lb)   SpO2 98%   BMI 37.71 kg/m²      Physical Exam  Constitutional:       General: She is not in acute distress.     Appearance: She is well-developed. She is not diaphoretic. "   HENT:      Head: Normocephalic and atraumatic.      Right Ear: External ear normal.      Left Ear: External ear normal.      Nose: Nose normal.      Mouth/Throat:      Pharynx: No oropharyngeal exudate.   Eyes:      General:         Right eye: No discharge.         Left eye: No discharge.      Conjunctiva/sclera: Conjunctivae normal.      Pupils: Pupils are equal, round, and reactive to light.   Neck:      Thyroid: No thyromegaly.      Trachea: No tracheal deviation.   Cardiovascular:      Rate and Rhythm: Normal rate and regular rhythm.      Heart sounds: Normal heart sounds. No murmur heard.     No friction rub. No gallop.   Pulmonary:      Effort: Pulmonary effort is normal. No respiratory distress.      Breath sounds: Normal breath sounds.   Abdominal:      General: There is no distension.      Palpations: Abdomen is soft.      Tenderness: There is no abdominal tenderness. There is no guarding or rebound.   Musculoskeletal:         General: Normal range of motion.   Lymphadenopathy:      Cervical: No cervical adenopathy.   Skin:     General: Skin is warm.   Neurological:      Mental Status: She is alert and oriented to person, place, and time.      Cranial Nerves: No cranial nerve deficit.   Psychiatric:         Behavior: Behavior normal.         Thought Content: Thought content normal.         Judgment: Judgment normal.

## 2025-01-17 NOTE — ASSESSMENT & PLAN NOTE
Stable, continue meds. Discussed supportive care and return parameters.   Orders:    lamoTRIgine (LaMICtal) 200 MG tablet; Take 1 tablet (200 mg total) by mouth daily

## 2025-01-20 ENCOUNTER — RESULTS FOLLOW-UP (OUTPATIENT)
Dept: GASTROENTEROLOGY | Facility: MEDICAL CENTER | Age: 65
End: 2025-01-20

## 2025-01-20 DIAGNOSIS — E83.00 LOW CERULOPLASMIN LEVEL: Primary | ICD-10-CM

## 2025-01-22 DIAGNOSIS — K21.9 GASTROESOPHAGEAL REFLUX DISEASE WITHOUT ESOPHAGITIS: ICD-10-CM

## 2025-01-22 RX ORDER — OMEPRAZOLE 40 MG/1
40 CAPSULE, DELAYED RELEASE ORAL DAILY
Qty: 90 CAPSULE | Refills: 1 | Status: SHIPPED | OUTPATIENT
Start: 2025-01-22

## 2025-01-27 DIAGNOSIS — R42 DIZZINESS: ICD-10-CM

## 2025-01-27 DIAGNOSIS — F31.81 BIPOLAR II DISORDER (HCC): ICD-10-CM

## 2025-01-27 DIAGNOSIS — E66.01 SEVERE OBESITY (BMI 35.0-39.9) WITH COMORBIDITY (HCC): ICD-10-CM

## 2025-01-28 RX ORDER — GABAPENTIN 600 MG/1
600 TABLET ORAL 3 TIMES DAILY
Qty: 270 TABLET | Refills: 1 | Status: SHIPPED | OUTPATIENT
Start: 2025-01-28

## 2025-01-28 RX ORDER — MECLIZINE HCL 12.5 MG 12.5 MG/1
12.5 TABLET ORAL EVERY 8 HOURS PRN
Qty: 30 TABLET | Refills: 2 | Status: SHIPPED | OUTPATIENT
Start: 2025-01-28

## 2025-01-28 RX ORDER — TOPIRAMATE 25 MG/1
25 TABLET, FILM COATED ORAL 2 TIMES DAILY
Qty: 180 TABLET | Refills: 1 | Status: SHIPPED | OUTPATIENT
Start: 2025-01-28

## 2025-01-31 ENCOUNTER — TELEPHONE (OUTPATIENT)
Dept: HEMATOLOGY ONCOLOGY | Facility: CLINIC | Age: 65
End: 2025-01-31

## 2025-02-07 ENCOUNTER — RESULTS FOLLOW-UP (OUTPATIENT)
Dept: GASTROENTEROLOGY | Facility: MEDICAL CENTER | Age: 65
End: 2025-02-07

## 2025-02-07 NOTE — PROGRESS NOTES
Physician Progress Note      PATIENT:               NEDRA WEBER  CSN #:                  [id number]  :                       1954  ADMIT DATE:       2024 10:41 AM  DISCH DATE:        2024 5:05 PM  RESPONDING  PROVIDER #:        Nneka Gregg PA-C          QUERY TEXT:    Pt admitted with Osteomyelitis and underwent Left ankle open reduction   internal fixation, noted documentation of s/p hardware removal and application   of bone cement due to osteomyelitis from hardware infection.? If possible,   please document in progress notes and discharge summary the relationship if   any between the osteomyelitis and the surgery:    ? The medical record reflects the following:  Risk Factors: S/P hardware, DM, [id number]  Clinical Indicators: H&P - Workup in the ER with left ankle x-ray shows   chronic appearing changes in the distal tibia-fibula and ankle, no new   cortical bone destruction, however given chronic deformities  Infectious Disease PN -complicated by MRSA infection s/p hardware removal   and application of bone cement due to osteomyelitis from hardware  infection. LLE hardware infection, Hardware complicating wound infection  PN by ID on - previously for ORIF 3/2024 ORIF LLE with ankle fracture   complicated by 2024 with drainage and MRSA bacteremia and non-union of   fracture s/p I/D and hardware removal 2024 and prolonged IV antibiotic   course. ?Then on 2024 underwent LLE tibiotalar intramedullary nail   fusion. ?This was also unfortunately complicated by MRSA infection s/p   hardware removal and application of bone cement due to osteomyelitis from   hardware infection and treated with prolonged Doxycycline course as it appears   that patient declined IV antibiotic therapy.  DS -MRSA Bacteremia, LLE osteomyelitis  x-ray shows chronic appearing changes in the distal tibia and fibula as well   as ankle  CT-infection bone left ankle.  Initial Blood culture  In basket ADT notifications received 10/27 for patient being treated and released from [facility] [address] [place] [address] Emergency Department. Anjum Rodriguez presented for c/o feeling off, unsteady gait due to feeling off balance. Patient taking oxycodone low dose post right shoulder surgery. CT of head normal.   No new meds prescribed. Per review of chart, patient's family has been in contact with orthopedic provider regarding her post op symptoms and recent ED visit. Unsuccessful attempt at reaching patient this am for utilization follow up call. Left name, call back number, office hours and message encouraging return call of this RN DAYANA. Will continue to follow in surveillance at this time. Return call from patient stating she is feeling a little better when compared to last week although she states not feeling the greatest.  She reports having moods -- feeling ok and then not feeling ok. Patient believes it may be from the oxycodone which she has not taken for about 18 hours. She is controlling her pain with tylenol. Before leaving the [facility], she states being offered offered VNA for PT, SN and aide but declined. She would like to now have VNA services and understands she can not have both in home and outpatient services and must be considered homebound. Informed patient per  VNA notes, agency does not offer home health aide and there must be a need for skilled care. Patient expressed not feeling safe going out and navigating the steps to get outside her home. Anjum Rordiguez is unable to bathe herself and her  is too nervous with her arm in sling. She is able to move hand well and states she is able move elbow as well. Attempted to encourage patient to attend outpatient services as she should see improvement each day but Maki once again reported feeling unsteady at home. RN CM agreed to check with local agencies for capacity and aide services.      Call placed to [facility] who reports having capacity to accept referral.  Agency provides [facility] services. Requested referral be faxed to F. [phone]. In basket sent to [facility] and [facility] with above request to submit referral to [facility] Sandoval if office agrees.

## 2025-02-13 ENCOUNTER — DOCUMENTATION (OUTPATIENT)
Dept: CASE MANAGEMENT | Facility: OTHER | Age: 65
End: 2025-02-13

## 2025-02-13 NOTE — MEDICAL HIGH UTILIZER
High Utilizer Care Plan for: Li Torre  Updated: 2025  Patient Name: Li Torre          MRN: [id number]       : 1960 Age: 64      Sex:  F   Utilization Background: She is a female with a history of migraine, [place]'s disease, Bipolar, Depression, Fibromyalgia, and HTN who presents to [facility] ([facility] [place]) and [facility] with migraine. She has 14 ER visits, 9 admission, and 2 transfers to [facility] for Ketamine Infusions in 2019. Discussed with Neurology reveals that patient does not feel much better even after prolonged hospitalization.     In the last 90 days: 0 encounters    Treatment Recommendations:  ED Recommendations: Recommendations as per neurology: Give migraine protocol: using steroid protocol d/t toradol allergy. Recommend calling her [place] [facility]rologist to schedule close outpatient follow up. It is noted that the patient will provide other complaints to the ER providers to get admitted and then will complain of migraine in the hospital.   Would not offer transfer for Ketamine Infusion to this patient as it has not worked in the past. This should be left up to Neurology to determine if this is appropriate.       Inpatient Recommendations: Early consultation with neurology. Avoid narcotics/sedating agents due to over sedation in the past       Outpatient recommendations: Needs close follow up with [facility]. Needs CM to make sure that patient does not run out of her meds as she has in the past.    Situation: Patient who presents frequently for migraines. It seems that she has started using other chief complaints to get admitted to the hospital for migraine treatment. As per neurology colleague her headache symptomatology does not usually change with treatment      PMH/PSH:  Migraine, Depression, Bipolar, Fibromyalgia, HTN, Hx of DVT      Assessment                              Drivers of repeated utilization:                 Symptomatology     Community Resources in place:     None - she has mentioned that she does not have a  for infusions  May need assistance with transportation and with medications   Patient Care Team:   Derrick Vasquez MD - PCP ([facility])  MOON Calle - Psych  MOON Hernandez/Luigi Jones MD - Neurology ([facility])  Altagracia Jade MD - Hematology ([facility])  Guillermo Hyde MD - Cardiology ([facility])  OP Care Manager: Kristi Kelley RN              Care plan date and owner: Terry Duckworth Jr., PA-C 10/31/2019         Reviewed with patient before discharge?

## 2025-02-17 DIAGNOSIS — I82.491 ACUTE DEEP VEIN THROMBOSIS (DVT) OF OTHER SPECIFIED VEIN OF RIGHT LOWER EXTREMITY (HCC): ICD-10-CM

## 2025-02-17 DIAGNOSIS — Z79.01 CHRONIC ANTICOAGULATION: ICD-10-CM

## 2025-02-17 DIAGNOSIS — I26.99 PE (PULMONARY THROMBOEMBOLISM) (HCC): ICD-10-CM

## 2025-02-17 NOTE — TELEPHONE ENCOUNTER
Reason for call:   [x] Refill   [] Prior Auth  [] Other:     Office:   [] PCP/Provider -   [x] Specialty/Provider - Hem onc    Medication: apixaban (Eliquis) 5 mg Take 1 tablet (5 mg total) by mouth 2 (two) times a day       Pharmacy: [facility] [id number]     Does the patient have enough for 3 days?   [x] Yes   [] No - Send as HP to POD

## 2025-02-19 ENCOUNTER — PATIENT OUTREACH (OUTPATIENT)
Dept: CASE MANAGEMENT | Facility: OTHER | Age: 65
End: 2025-02-19

## 2025-03-14 ENCOUNTER — PATIENT OUTREACH (OUTPATIENT)
Dept: CASE MANAGEMENT | Facility: OTHER | Age: 65
End: 2025-03-14

## 2025-03-14 ENCOUNTER — OFFICE VISIT (OUTPATIENT)
Dept: HEMATOLOGY ONCOLOGY | Facility: CLINIC | Age: 65
End: 2025-03-14
Payer: COMMERCIAL

## 2025-03-14 VITALS
RESPIRATION RATE: 18 BRPM | SYSTOLIC BLOOD PRESSURE: 140 MMHG | TEMPERATURE: 97.8 F | OXYGEN SATURATION: 99 % | WEIGHT: 241 LBS | HEART RATE: 100 BPM | HEIGHT: 68 IN | DIASTOLIC BLOOD PRESSURE: 80 MMHG | BODY MASS INDEX: 36.53 KG/M2

## 2025-03-14 DIAGNOSIS — E53.8 FOLATE DEFICIENCY: Primary | ICD-10-CM

## 2025-03-14 DIAGNOSIS — E53.8 B12 DEFICIENCY: ICD-10-CM

## 2025-03-14 DIAGNOSIS — Z90.3 POSTGASTRECTOMY MALABSORPTION: ICD-10-CM

## 2025-03-14 DIAGNOSIS — K91.2 POSTGASTRECTOMY MALABSORPTION: ICD-10-CM

## 2025-03-14 DIAGNOSIS — D75.838 SECONDARY THROMBOCYTOSIS: ICD-10-CM

## 2025-03-14 PROCEDURE — 99213 OFFICE O/P EST LOW 20 MIN: CPT | Performed by: PHYSICIAN ASSISTANT

## 2025-03-14 PROCEDURE — G2211 COMPLEX E/M VISIT ADD ON: HCPCS | Performed by: PHYSICIAN ASSISTANT

## 2025-03-14 RX ORDER — FOLIC ACID 1 MG/1
2 TABLET ORAL DAILY
Qty: 180 TABLET | Refills: 1 | Status: SHIPPED | OUTPATIENT
Start: 2025-03-14

## 2025-03-14 NOTE — PROGRESS NOTES
Name: Li Torre      : 1960      MRN: [id number]  Encounter Provider: Saloni Ralph PA-C  Encounter Date: 3/14/2025   Encounter department: [facility] [place]  :  Assessment & Plan  Folate deficiency  Patient continues to be folic acid deficient despite taking 1 tablet every day.  We will increase this to 2 tablets daily.  I am going to have the patient space it out over the day 1 mg in the morning and 1 mg at night.    Regimen:  Folic acid 2 mg daily.    Etiology previous history of gastric bypass.  Orders:  •  folic acid ( Folic Acid) 1 mg tablet; Take 2 tablets (2 mg total) by mouth daily  •  CBC and differential; Future  •  Iron Panel (Includes Ferritin, Iron Sat%, Iron, and TIBC); Future  •  Vitamin B12; Future  •  Folate; Future    Postgastrectomy malabsorption  Observation with iron labs as well as B12 and folate recommended.  Orders:  •  Iron Panel (Includes Ferritin, Iron Sat%, Iron, and TIBC); Future    B12 deficiency  Supratherapeutic  B12 discontinued in January will restart at a half dose reduction weekly.    Regimen:  B12 1000 mcg p.o. Monday through Friday  Or  B12 1500 mcg p.o. Monday, Wednesday, Friday    Check 6 months    Orders:  •  CBC and differential; Future  •  Iron Panel (Includes Ferritin, Iron Sat%, Iron, and TIBC); Future  •  Vitamin B12; Future  •  Folate; Future    Secondary thrombocytosis  Presently undergoing workup for liver dysfunction.  Secondary thrombocytosis is likely due to inflammation especially since iron levels have been corrected.  Patient's platelet count is mildly elevated.  No additional workup is necessary at this time.    Orders:  •  CBC and differential; Future  •  Iron Panel (Includes Ferritin, Iron Sat%, Iron, and TIBC); Future  •  Vitamin B12; Future  •  Folate; Future      Return in about 6 months (around 2025) for Labs, [facility] Office Visit.    History of Present Illness   Chief Complaint   Patient  presents with   • Follow-up     Pertinent Medical History   This is a 63-year-old female with past medical history of adrenal insufficiency bipolar disorder, chronic back pain, fibromyalgia, TIAs, migraine syndrome, iron deficiency, B12 and folic acid deficiency originally seen in consultation through [facility] in October 2022 transitioning care from [facility] for infusions.  Patient underwent bariatric gastric sleeve surgery in 2018.  Patient could not tolerate oral iron secondary to postsurgical malabsorption and constipation.     11/30/2018:  Hemoglobin 11.3, MCV 79, platelets 297, WBC 8.7  04/11/2019:  Hemoglobin 8.7, MCV 82, platelets 293, WBC 9.13              Ferritin 12, serum iron 27, TIBC 396  12/06/2021:  Hemoglobin 10.9, MCV 87, platelets 446, WBC 12.71              Ferritin 15, serum iron 30, TIBC 501  09/15/2022:  Hemoglobin 12.1, MCV 82, platelets 502, WBC 18.26              Ferritin 9, serum iron 36, TIBC 544  10/11/2022: Hemoglobin 11, MCV 84, platelets 484, WBC 10.03              Ferritin 10, iron saturation 5%, TIBC 495, serum iron 27              B12 320, , folate 15.1    Venofer 200 mg x6 (pepcid, benadryl, and decadron): 10/21-11/25  3/8/2024: Hemoglobin 13.6, MCV 89, platelets 352, WBC 8.82              Ferritin 35, iron saturation 25%, TIBC 405, serum iron 101              B12 460, folate 5.9  9/20/2024 hemoglobin 12.2, MCV 93, WBC 10.11, platelet count 419              Ferritin 17, iron saturation 24              B12 261, MMA high greater than 500, folate 7.9  09/26/24: WBC 10.1, hemoglobin 12.2, MCV 93, platelet count 419   Ferritin 17    September through October 2024: Venofer 200 mg x 4 + b12 injections  1/15/2025 WBC 10.8, hemoglobin 12.8, MCV 96, platelet count 4 7  Ferritin 210 iron saturation 23%, B12 high 2643, ferritin 4.3      03/14/25: Patient notes compliance with foalte.  Patient was taking 1500 mcg of B12 daily.  Patient has discontinued since January.   "Patient was told to restart back up taking 1500 iron good grams Monday Wednesday Friday(reduction to 4500 mcg to 5000 mcg a week)       Last DEXA:10/31/2024 Osteopenia.   Last Mammo : 06/13/2023- ordered not completed  Last Colonscopy: 07/18/2022 - Follow up 10 years.   Last Pap:  Not on file    Review of Systems   Constitutional:  Positive for fatigue. Negative for appetite change, fever and unexpected weight change.   HENT:  Negative for nosebleeds.    Respiratory:  Negative for cough, choking and shortness of breath.         Negative hemoptysis.   Cardiovascular:  Negative for chest pain, palpitations and leg swelling.   Gastrointestinal: Negative.  Negative for abdominal distention, abdominal pain, anal bleeding, blood in stool, constipation, diarrhea, nausea and vomiting.   Endocrine: Negative.  Negative for cold intolerance.   Genitourinary: Negative.  Negative for hematuria, menstrual problem, vaginal bleeding, vaginal discharge and vaginal pain.   Musculoskeletal: Negative.  Negative for arthralgias, myalgias, neck pain and neck stiffness.   Skin: Negative.  Negative for color change, pallor and rash.   Allergic/Immunologic: Negative.  Negative for immunocompromised state.   Neurological: Negative.  Negative for weakness and headaches.   Hematological:  Negative for adenopathy. Does not bruise/bleed easily.   All other systems reviewed and are negative.    Medical History Reviewed by provider this encounter:     .      Objective   /80 (BP Location: Left arm, Patient Position: Sitting, Cuff Size: Adult)   Pulse 100   Temp 97.8 °F (36.6 °C)   Resp 18   Ht 5' 8\" (1.727 m)   Wt 109 kg (241 lb)   SpO2 99%   BMI 36.64 kg/m²     Physical Exam  Constitutional:       General: She is not in acute distress.     Appearance: She is well-developed.   HENT:      Head: Normocephalic and atraumatic.   Eyes:      General: No scleral icterus.     Conjunctiva/sclera: Conjunctivae normal.   Cardiovascular:      Rate " and Rhythm: Normal rate.   Pulmonary:      Effort: Pulmonary effort is normal. No respiratory distress.   Skin:     General: Skin is warm.      Coloration: Skin is not pale.      Findings: No rash.   Neurological:      Mental Status: She is alert and oriented to person, place, and time.   Psychiatric:         Thought Content: Thought content normal.         Labs: I have reviewed the following labs:  Results for orders placed or performed in visit on 01/15/25   CBC and differential   Result Value Ref Range    WBC 10.89 (H) 4.31 - 10.16 Thousand/uL    RBC 4.06 3.81 - 5.12 Million/uL    Hemoglobin 12.8 11.5 - 15.4 g/dL    Hematocrit 38.9 34.8 - 46.1 %    MCV 96 82 - 98 fL    MCH 31.5 26.8 - 34.3 pg    MCHC 32.9 31.4 - 37.4 g/dL    RDW 14.2 11.6 - 15.1 %    MPV 9.7 8.9 - 12.7 fL    Platelets 407 (H) 149 - 390 Thousands/uL    nRBC 0 /100 WBCs    Segmented % 68 43 - 75 %    Immature Grans % 0 0 - 2 %    Lymphocytes % 23 14 - 44 %    Monocytes % 7 4 - 12 %    Eosinophils Relative 1 0 - 6 %    Basophils Relative 1 0 - 1 %    Absolute Neutrophils 7.30 1.85 - 7.62 Thousands/µL    Absolute Immature Grans 0.04 0.00 - 0.20 Thousand/uL    Absolute Lymphocytes 2.52 0.60 - 4.47 Thousands/µL    Absolute Monocytes 0.81 0.17 - 1.22 Thousand/µL    Eosinophils Absolute 0.15 0.00 - 0.61 Thousand/µL    Basophils Absolute 0.07 0.00 - 0.10 Thousands/µL   Vitamin B12   Result Value Ref Range    Vitamin B-12 2,643 (H) 180 - 914 pg/mL   Result Value Ref Range    Folate 4.6 (L) >5.9 ng/mL   Anti-smooth muscle antibody, IgG   Result Value Ref Range    Smooth Muscle Ab 3 0 - 19 Units   EFRAÍN by IFA Reflex for Rheumatologist   Result Value Ref Range    Anti Nuclear Antibodies Negative Negative   Result Value Ref Range    Ceruloplasmin 17.9 (L) 19.0 - 39.0 mg/dL   Alpha-1-antitrypsin   Result Value Ref Range    A-1 Antitrypsin 179 101 - 187 mg/dL   Chronic Hepatitis Panel   Result Value Ref Range    Hepatitis B Surface Ag Non-reactive Non-Reactive     Hepatitis C Ab Non-reactive Non-Reactive    Hep B C IgM Non-reactive Non-Reactive    Hep B Core Total Ab Non-reactive Non-Reactive   Antimitochondrial antibody   Result Value Ref Range    Mitochondrial Ab <20.0 0.0 - 20.0 Units   TIBC Panel (incl. Iron, TIBC, % Iron Saturation)   Result Value Ref Range    Iron Saturation 23 15 - 50 %    TIBC 327.6 250 - 450 ug/dL    Iron 74 50 - 212 ug/dL    Transferrin 234 203 - 362 mg/dL    UIBC 254 155 - 355 ug/dL   Result Value Ref Range    Ferritin 210 11 - 307 ng/mL   Tissue transglutaminase, IgG   Result Value Ref Range    Tissue Transglutaminase tTG IgG <1.7 See Comment U/mL   Tissue Transglutaminase, IgA   Result Value Ref Range    Tissue Transglutaminase tTG IgA 0.9 See Comment U/mL   Deaminated Gliadin Antibody, IgA   Result Value Ref Range    Deaminated Gliadin Ab, IgA 1.2 See Comment U/mL   Deaminated Gliadin Antibody, IgG   Result Value Ref Range    Deaminated Gliadin Ab, IgG <1.4 See Comment U/mL   Result Value Ref Range     66 - 433 mg/dL     Lab Results   Component Value Date/Time    Iron 74 01/15/2025 12:11 PM    Iron Saturation 23 01/15/2025 12:11 PM    Ferritin 210 01/15/2025 12:11 PM    Vitamin B-12 2,643 (H) 01/15/2025 12:11 PM    Folate 4.6 (L) 01/15/2025 12:11 PM            Administrative Statements   I have spent a total time of 27 minutes in caring for this patient on the day of the visit/encounter including Instructions for management, Counseling / Coordination of care, and Documenting in the medical record.

## 2025-03-14 NOTE — PROGRESS NOTES
Received  in basket message as covering RN Care Manager. Chart reviewed.   Pt with no ED or hospital admissions.  Has an appointment 3/14/25 with Heme/Onc.   Future appointments  4/7/25 Spine and Pain  7/18/25 PCP.  Will continue to monitor electronically.  Next chart review scheduled.

## 2025-04-07 ENCOUNTER — OFFICE VISIT (OUTPATIENT)
Dept: PAIN MEDICINE | Facility: MEDICAL CENTER | Age: 65
End: 2025-04-07
Payer: COMMERCIAL

## 2025-04-07 VITALS — HEIGHT: 68 IN | WEIGHT: 245 LBS | BODY MASS INDEX: 37.13 KG/M2

## 2025-04-07 DIAGNOSIS — M54.50 CHRONIC BILATERAL LOW BACK PAIN WITHOUT SCIATICA: ICD-10-CM

## 2025-04-07 DIAGNOSIS — M79.18 MYOFASCIAL PAIN SYNDROME: ICD-10-CM

## 2025-04-07 DIAGNOSIS — G89.29 CHRONIC BILATERAL LOW BACK PAIN WITHOUT SCIATICA: ICD-10-CM

## 2025-04-07 DIAGNOSIS — M47.816 LUMBAR SPONDYLOSIS: Primary | ICD-10-CM

## 2025-04-07 PROCEDURE — 99214 OFFICE O/P EST MOD 30 MIN: CPT

## 2025-04-07 PROCEDURE — G2211 COMPLEX E/M VISIT ADD ON: HCPCS

## 2025-04-07 RX ORDER — BACLOFEN 10 MG/1
5 TABLET ORAL 3 TIMES DAILY PRN
Qty: 90 TABLET | Refills: 1 | Status: SHIPPED | OUTPATIENT
Start: 2025-04-07

## 2025-04-07 NOTE — PROGRESS NOTES
Assessment:  1. Lumbar spondylosis    2. Chronic bilateral low back pain without sciatica    3. Myofascial pain syndrome        Plan:  Patient reports that their pain improved by more than 90% and ADLs improved by more than 50% for 9 months following prior bilateral L3-5 RFA.  Will repeat bilateral L3-5 RFA at this time.  Complete risks and benefits including hyperglycemia, bleeding, infection, local tissue reaction, nerve injury, and allergic reaction were discussed. The approach was demonstrated using models and literature was provided. The patient was agreeable, verbalized an understanding, and wishes to proceed with scheduling the procedure.    May consider repeat bilateral SIJ injections at next visit.  Patient understands that these injections can be done on an as-needed basis.    Patient notes experiencing minimal symptom relief with the use of methocarbamol.  Discontinue the use of methocarbamol at this time.  May trial baclofen 5 mg as prescribed.  Prescription sent to patient's pharmacy today.    Continue the use of gabapentin as prescribed by PCP.    Follow-up after bilateral L3-5 radiofrequency ablations.    My impressions and treatment recommendations were discussed in detail with the patient who verbalized understanding and had no further questions.  Discharge instructions were provided. I personally saw and examined the patient and I agree with the above discussed plan of care.    Orders Placed This Encounter   Procedures    FL spine and pain procedure     Admits the use of Eliquis. Denies pacemaker/defibrillator.     Standing Status:   Future     Expected Date:   4/7/2025     Expiration Date:   4/7/2029     Reason for Exam::   Right L3-5 RFA     Anticoagulant hold needed?:   Yes    FL spine and pain procedure     Admits the use of Eliquis daily. Denies pacemaker/defibrillator.     Standing Status:   Future     Expected Date:   4/7/2025     Expiration Date:   4/7/2029     Reason for Exam::   Left L3-5  RFA     Anticoagulant hold needed?:   Yes     New Medications Ordered This Visit   Medications    baclofen 10 mg tablet     Sig: Take 0.5 tablets (5 mg total) by mouth 3 (three) times a day as needed for muscle spasms     Dispense:  90 tablet     Refill:  1       History of Present Illness:  Li Torre is a 64 y.o. female who presents for a follow up office visit in regards to Back Pain.   Patient notes that over the past 3 months she has been experiencing worsening bilateral low back pain.  She describes her pain as a constant dull, aching, sharp, and cramping pain.  She rates her pain 8/10 on a numeric rating scale.  Patient states her pain is at its worst upon waking.  Denies numbness, tingling, radiating pain, and weakness of bilateral lower extremities. Admits to the use of Tylenol, gabapentin, and methocarbamol for symptom management.    Patient notes that she had bilateral L3-5 RFAs completed 1 year ago.  Patient notes experiencing 90% pain reduction which lasted approximately 90 months prior to her pain returning to her baseline.  Patient notes significant improvement in ADLs following these prior procedures.    Patient had bilateral SIJ injections completed on 9/25/2024.  Patient still noting resolution of bilateral SIJ pain following these injections.    I have personally reviewed and/or updated the patient's past medical history, past surgical history, family history, social history, current medications, allergies, and vital signs today.     Review of Systems   Respiratory:  Negative for shortness of breath.    Cardiovascular:  Negative for chest pain.   Gastrointestinal:  Negative for constipation, diarrhea, nausea and vomiting.   Musculoskeletal:  Positive for back pain and gait problem. Negative for arthralgias, joint swelling and myalgias.   Skin:  Negative for rash.   Neurological:  Negative for dizziness, seizures and weakness.   All other systems reviewed and are negative.      Patient Active  Problem List   Diagnosis    Bipolar depression (HCC)    Hypokalemia    Adrenal insufficiency (Jefferson's disease) (HCC)    Vertigo    Fibromyalgia    Osteoarthritis of lumbosacral spine without myelopathy    HLD (hyperlipidemia)    Orthostatic hypotension    History of TIAs    History of migraine    Neck pain    Low back pain    Hypertension    Bipolar II disorder (HCC)    Chronic back pain    Anxiety    Leukocytosis    Chronic anticoagulation    Anemia    Ambulatory dysfunction    Edema    History of anxiety    History of pulmonary embolism    Primary osteoarthritis of left shoulder    Status post total replacement of left shoulder    Alkalosis    Primary osteoarthritis of right shoulder    S/P reverse total shoulder arthroplasty, left    Iron deficiency anemia following bariatric surgery    Vitamin B 12 deficiency    Bilateral occipital neuralgia    Fatigue    Anticoagulated by anticoagulation treatment    History of peptic ulcer disease    Hyperglycemia    Hypersomnia    Intractable vomiting with nausea    Lumbar spondylosis    Migraine aura, persistent    Mild episode of recurrent major depressive disorder (HCC)    Myofascial pain    Peripheral neuropathy    Recurrent falls    Right hip pain    Subclinical hyperthyroidism    Thoracic or lumbosacral neuritis or radiculitis    Weakness of both lower extremities    Postmenopausal    Obesity (BMI 30-39.9)    S/P reverse total shoulder arthroplasty, right    Aftercare following right shoulder joint replacement surgery    IFG (impaired fasting glucose)    Other hyperlipidemia    Obesity, morbid (HCC)    Gastroesophageal reflux disease without esophagitis    RUQ discomfort    Sacroiliitis (HCC)    History of DVT (deep vein thrombosis)    Elevated alkaline phosphatase level    Hammertoe of right foot    Severe obesity (BMI 35.0-39.9) with comorbidity (HCC)       Past Medical History:   Diagnosis Date    Adrenal insufficiency (Andrés's disease) (HCC)     Anemia 09 15  "2022    Was found in bloodwork done on that day    Anxiety 2019    Arthritis 2015    Bilateral pulmonary contusion 07/03/2020    Bipolar disorder     Cervical radiculopathy     Chest pain     seen in ER 9/18, dx with gas    Chronic back pain     Chronic pain disorder     lumbar    Closed head injury with brief loss of consciousness (HCC) 10/15/2019    Contusion of right hand 07/03/2021    Depression 2019    Difficulty walking 9/2025    I feel like 4 toes on r are clubbing.    DVT, lower extremity (HCC)     1991 and 2019 now on eliquis    Exercises 5 to 6 times per week     5 days per week with weights/band and also goes to PT 2x/week    Fall down stairs 07/03/2020    Fibromyalgia     Fibromyalgia, primary     GERD (gastroesophageal reflux disease) 8/2/2024    Put on medication Omeprazole DR 40mg    Headache(784.0) 2018    I get migraines. See Neurologist for this.    Hematoma of parietal scalp 07/03/2020    History of Clostridioides difficile infection     History of MRSA infection     pt denies having this    History of recent fall 07/2021    \"possibly injured left shoulder'    History of TIAs     cannot remember details    Hypertension 2021    Seen Cardiologist and put me on medication    Hypokalemia     Intractable migraine without aura and without status migrainosus 10/02/2019    Left shoulder pain     \"not too bad\" goes to PT 2x/week    Migraine     Obesity 2019    Psychiatric disorder     Anxiety, major depression, bipolar    Spinal stenosis     Syncope 2014    orthostatic hypotension    Wears dentures     upper    Wears glasses        Past Surgical History:   Procedure Laterality Date    APPENDECTOMY      Removed    BARIATRIC SURGERY  2015    Bariatic Sleeve    CAST APPLICATION Left 07/04/2020    Procedure: Application short-arm splint;  Surgeon: Sony Zhang MD;  Location: [facility];  Service: Orthopedics    COLONOSCOPY  2022    Ordered as Routine by PCP    FL INJECTION LEFT SHOULDER (ARTHROGRAM)  " 11/10/2021    GASTRIC RESTRICTION SURGERY      Gastric Sleeve 2015    HIP SURGERY  2016    Hip Replacement    HYSTERECTOMY  2004    DONALD    JOINT REPLACEMENT      Left Knee  and Right Hip 2010    KNEE SURGERY  2015    Knee Replacement    OOPHORECTOMY      ORIF WRIST FRACTURE Left 2020    Procedure: Open reduction and internal fixation left radius and ulnar shaft fracture;  Surgeon: Sony Zhang MD;  Location: [facility];  Service: Orthopedics    NH ARTHROPLASTY GLENOHUMERAL JOINT TOTAL SHOULDER Left 2021    Procedure: ARTHROPLASTY SHOULDER - anatomic;  Surgeon: Daryn Daniel MD;  Location: [facility];  Service: Orthopedics    NH ARTHROPLASTY GLENOHUMERAL JOINT TOTAL SHOULDER Right 10/24/2023    Procedure: ANATOMIC VS REVERSE TOTAL SHOULDER ARTHROPLASTY, WITH BICEPS TENDONESIS;  Surgeon: Daryn Daniel MD;  Location: [facility];  Service: Orthopedics    NH NATIVIDAD SHOULDER ARTHRPLSTY HUMERAL&GLENOID COMPNT Left 2021    Procedure: REVISION OF TOTAL SHOULDER ARTHROPLASTY TO REVERSE TOTAL SHOULDER ARTHROPLASTY;  Surgeon: Daryn Daniel MD;  Location: [facility];  Service: Orthopedics       Family History   Problem Relation Age of Onset    Breast cancer Mother 37    Migraines Mother     Arthritis Mother     Depression Mother     Cancer Mother     Anxiety disorder Mother     Kidney disease Father     Heart disease Father     Diabetes Father     Arthritis Father     Hypertension Father     Diabetes type I Father     Brain cancer Brother     Seizures Brother        Social History     Occupational History    Occupation: retired   Tobacco Use    Smoking status: Former     Current packs/day: 0.00     Average packs/day: 0.2 packs/day for 20.0 years (4.0 ttl pk-yrs)     Types: Cigarettes     Start date: 1998     Quit date: 2008     Years since quittin.2    Smokeless tobacco: Never    Tobacco comments:     quit   Vaping Use    Vaping status: Never Used   Substance  and Sexual Activity    Alcohol use: Not Currently     Alcohol/week: 2.0 standard drinks of alcohol     Types: 1 Glasses of wine, 1 Standard drinks or equivalent per week     Comment: occasionally    Drug use: Never     Comment: na    Sexual activity: Not Currently     Partners: Male     Birth control/protection: Abstinence     Comment: defer       Current Outpatient Medications on File Prior to Visit   Medication Sig    amLODIPine (NORVASC) 5 mg tablet Take 1 tablet (5 mg total) by mouth daily    apixaban (Eliquis) 5 mg Take 1 tablet (5 mg total) by mouth 2 (two) times a day    ARIPiprazole (ABILIFY) 5 mg tablet TAKE 1 TABLET (5 MG TOTAL) BY MOUTH DAILY.    atorvastatin (LIPITOR) 40 mg tablet Take 1 tablet (40 mg total) by mouth daily    cholecalciferol (VITAMIN D3) 1,000 units tablet     folic acid (KP Folic Acid) 1 mg tablet Take 2 tablets (2 mg total) by mouth daily    furosemide (LASIX) 20 mg tablet Take 1 tablet (20 mg total) by mouth as needed (leg swelling) Pt reports calls her [facility] cardiologist Dr Michel before taking    gabapentin (NEURONTIN) 600 MG tablet Take 1 tablet (600 mg total) by mouth 3 (three) times a day    lamoTRIgine (LaMICtal) 200 MG tablet Take 1 tablet (200 mg total) by mouth daily    LORazepam (ATIVAN) 0.5 mg tablet Take 1 tablet (0.5 mg total) by mouth daily as needed for anxiety    meclizine (ANTIVERT) 12.5 MG tablet Take 1 tablet (12.5 mg total) by mouth every 8 (eight) hours as needed for dizziness    omeprazole (PriLOSEC) 40 MG capsule Take 1 capsule (40 mg total) by mouth daily    ondansetron (ZOFRAN-ODT) 4 mg disintegrating tablet TAKE 1 TABLET BY MOUTH EVERY 8 HOURS AS NEEDED FOR NAUSEA AND VOMITING    topiramate (TOPAMAX) 25 mg tablet Take 1 tablet (25 mg total) by mouth 2 (two) times a day    [DISCONTINUED] methocarbamol (ROBAXIN) 750 mg tablet Take 1 tablet (750 mg total) by mouth every 8 (eight) hours as needed for muscle spasms    bisacodyl (DULCOLAX) 5 mg EC tablet Take as  "directed by GI office    potassium chloride (K-DUR,KLOR-CON) 20 mEq tablet Take 20 mEq by mouth daily   (Patient not taking: Reported on 1/15/2025)     No current facility-administered medications on file prior to visit.       Allergies   Allergen Reactions    Ketorolac Itching and Other (See Comments)     [toradol] Itching, hives    Mushroom Extract Complex - Food Allergy Hives    Oxycodone Other (See Comments)     Irritable ,severe mood change        Physical Exam:    Ht 5' 8\" (1.727 m)   Wt 111 kg (245 lb)   BMI 37.25 kg/m²     Constitutional:normal, well developed, well nourished, alert, in no distress and non-toxic and no overt pain behavior.  Eyes:anicteric  HEENT:grossly intact  Neck:supple, symmetric, trachea midline and no masses   Pulmonary:even and unlabored  Cardiovascular:No edema or pitting edema present  Skin:Normal without rashes or lesions and well hydrated  Psychiatric:Mood and affect appropriate  Neurologic:Cranial Nerves II-XII grossly intact, bilateral lower extremity strength is normal  Musculoskeletal:normal, negative seated leg raise bilaterally, tenderness to palpation along bilateral lumbar facet joints, lumbar pain exacerbated with lumbar extension and lumbar extension with rotation bilaterally    Imaging    "

## 2025-04-16 ENCOUNTER — HOSPITAL ENCOUNTER (OUTPATIENT)
Dept: RADIOLOGY | Facility: MEDICAL CENTER | Age: 65
Discharge: HOME | End: 2025-04-16
Payer: COMMERCIAL

## 2025-04-16 ENCOUNTER — TELEPHONE (OUTPATIENT)
Dept: PAIN MEDICINE | Facility: MEDICAL CENTER | Age: 65
End: 2025-04-16

## 2025-04-16 VITALS
HEART RATE: 83 BPM | SYSTOLIC BLOOD PRESSURE: 146 MMHG | RESPIRATION RATE: 18 BRPM | DIASTOLIC BLOOD PRESSURE: 84 MMHG | OXYGEN SATURATION: 97 % | TEMPERATURE: 97.9 F

## 2025-04-16 DIAGNOSIS — M47.816 LUMBAR SPONDYLOSIS: ICD-10-CM

## 2025-04-16 PROCEDURE — 64636 DESTROY L/S FACET JNT ADDL: CPT | Performed by: PHYSICAL MEDICINE & REHABILITATION

## 2025-04-16 PROCEDURE — 64635 DESTROY LUMB/SAC FACET JNT: CPT | Performed by: PHYSICAL MEDICINE & REHABILITATION

## 2025-04-16 RX ADMIN — LIDOCAINE HYDROCHLORIDE 5 ML: 20 INJECTION, SOLUTION EPIDURAL; INFILTRATION; INTRACAUDAL; PERINEURAL at 10:44

## 2025-04-16 NOTE — DISCHARGE INSTR - LAB

## 2025-04-16 NOTE — H&P
"History of Present Illness: The patient is a 64 y.o. female who presents with complaints of right low back pain    Past Medical History:   Diagnosis Date    Adrenal insufficiency (Andrés's disease) (Formerly [facility])     Anemia 09 15 2022    Was found in bloodwork done on that day    Anxiety 2019    Arthritis 2015    Bilateral pulmonary contusion 07/03/2020    Bipolar disorder     Cervical radiculopathy     Chest pain     seen in ER 9/18, dx with gas    Chronic back pain     Chronic pain disorder     lumbar    Closed head injury with brief loss of consciousness (Formerly [facility]) 10/15/2019    Contusion of right hand 07/03/2021    Depression 2019    Difficulty walking 9/2025    I feel like 4 toes on r are clubbing.    DVT, lower extremity (Formerly [facility])     1991 and 2019 now on eliquis    Exercises 5 to 6 times per week     5 days per week with weights/band and also goes to PT 2x/week    Fall down stairs 07/03/2020    Fibromyalgia     Fibromyalgia, primary     GERD (gastroesophageal reflux disease) 8/2/2024    Put on medication Omeprazole DR 40mg    Headache(784.0) 2018    I get migraines. See Neurologist for this.    Hematoma of parietal scalp 07/03/2020    History of Clostridioides difficile infection     History of MRSA infection     pt denies having this    History of recent fall 07/2021    \"possibly injured left shoulder'    History of TIAs     cannot remember details    Hypertension 2021    Seen Cardiologist and put me on medication    Hypokalemia     Intractable migraine without aura and without status migrainosus 10/02/2019    Left shoulder pain     \"not too bad\" goes to PT 2x/week    Migraine     Obesity 2019    Psychiatric disorder     Anxiety, major depression, bipolar    Spinal stenosis     Syncope 2014    orthostatic hypotension    Wears dentures     upper    Wears glasses        Past Surgical History:   Procedure Laterality Date    APPENDECTOMY      Removed    BARIATRIC SURGERY  2015    Bariatic Sleeve    CAST APPLICATION Left " 07/04/2020    Procedure: Application short-arm splint;  Surgeon: Sony Zhang MD;  [facility];  Service: Orthopedics    COLONOSCOPY  2022    Ordered as Routine by PCP    FL INJECTION LEFT SHOULDER (ARTHROGRAM)  11/10/2021    GASTRIC RESTRICTION SURGERY      Gastric Sleeve Nov 2015    HIP SURGERY  2016    Hip Replacement    HYSTERECTOMY  2004    DONALD    JOINT REPLACEMENT      Left Knee 2011 and Right Hip 2010    KNEE SURGERY  2015    Knee Replacement    OOPHORECTOMY      ORIF WRIST FRACTURE Left 07/04/2020    Procedure: Open reduction and internal fixation left radius and ulnar shaft fracture;  Surgeon: Sony Zhang MD;  [facility];  Service: Orthopedics    SC ARTHROPLASTY GLENOHUMERAL JOINT TOTAL SHOULDER Left 03/30/2021    Procedure: ARTHROPLASTY SHOULDER - anatomic;  Surgeon: Daryn Daniel MD;  [facility];  Service: Orthopedics    SC ARTHROPLASTY GLENOHUMERAL JOINT TOTAL SHOULDER Right 10/24/2023    Procedure: ANATOMIC VS REVERSE TOTAL SHOULDER ARTHROPLASTY, WITH BICEPS TENDONESIS;  Surgeon: Daryn Daniel MD;  [facility];  Service: Orthopedics    SC NATIVIDAD SHOULDER ARTHRPLSTY HUMERAL&GLENOID COMPNT Left 12/21/2021    Procedure: REVISION OF TOTAL SHOULDER ARTHROPLASTY TO REVERSE TOTAL SHOULDER ARTHROPLASTY;  Surgeon: Daryn Daniel MD;  [facility];  Service: Orthopedics         Current Outpatient Medications:     amLODIPine (NORVASC) 5 mg tablet, Take 1 tablet (5 mg total) by mouth daily, Disp: 90 tablet, Rfl: 1    apixaban (Eliquis) 5 mg, Take 1 tablet (5 mg total) by mouth 2 (two) times a day, Disp: 180 tablet, Rfl: 1    ARIPiprazole (ABILIFY) 5 mg tablet, TAKE 1 TABLET (5 MG TOTAL) BY MOUTH DAILY., Disp: 90 tablet, Rfl: 1    atorvastatin (LIPITOR) 40 mg tablet, Take 1 tablet (40 mg total) by mouth daily, Disp: 90 tablet, Rfl: 2    baclofen 10 mg tablet, Take 0.5 tablets (5 mg total) by mouth 3 (three) times a day as needed for  muscle spasms, Disp: 90 tablet, Rfl: 1    cholecalciferol (VITAMIN D3) 1,000 units tablet, , Disp: , Rfl:     folic acid (KP Folic Acid) 1 mg tablet, Take 2 tablets (2 mg total) by mouth daily, Disp: 180 tablet, Rfl: 1    furosemide (LASIX) 20 mg tablet, Take 1 tablet (20 mg total) by mouth as needed (leg swelling) Pt reports calls her [facility] cardiologist Dr Michel before taking, Disp: 90 tablet, Rfl: 1    gabapentin (NEURONTIN) 600 MG tablet, Take 1 tablet (600 mg total) by mouth 3 (three) times a day, Disp: 270 tablet, Rfl: 1    lamoTRIgine (LaMICtal) 200 MG tablet, Take 1 tablet (200 mg total) by mouth daily, Disp: 90 tablet, Rfl: 1    LORazepam (ATIVAN) 0.5 mg tablet, Take 1 tablet (0.5 mg total) by mouth daily as needed for anxiety, Disp: 15 tablet, Rfl: 0    meclizine (ANTIVERT) 12.5 MG tablet, Take 1 tablet (12.5 mg total) by mouth every 8 (eight) hours as needed for dizziness, Disp: 30 tablet, Rfl: 2    omeprazole (PriLOSEC) 40 MG capsule, Take 1 capsule (40 mg total) by mouth daily, Disp: 90 capsule, Rfl: 1    ondansetron (ZOFRAN-ODT) 4 mg disintegrating tablet, TAKE 1 TABLET BY MOUTH EVERY 8 HOURS AS NEEDED FOR NAUSEA AND VOMITING, Disp: 90 tablet, Rfl: 0    potassium chloride (K-DUR,KLOR-CON) 20 mEq tablet, Take 20 mEq by mouth daily   (Patient not taking: Reported on 1/15/2025), Disp: , Rfl:     topiramate (TOPAMAX) 25 mg tablet, Take 1 tablet (25 mg total) by mouth 2 (two) times a day, Disp: 180 tablet, Rfl: 1  No current facility-administered medications for this encounter.    Allergies   Allergen Reactions    Ketorolac Itching and Other (See Comments)     [toradol] Itching, hives    Mushroom Extract Complex - Food Allergy Hives    Oxycodone Other (See Comments)     Irritable ,severe mood change        Physical Exam:   Vitals:    04/16/25 1028   BP: 144/83   Pulse: 83   Resp: 18   Temp: 97.9 °F (36.6 °C)   SpO2: 97%     General: Awake, Alert, Oriented x 3, Mood and affect appropriate  Respiratory:  Respirations even and unlabored  Cardiovascular: Peripheral pulses intact; no edema  Musculoskeletal Exam: right low back pain    ASA Score: 3    Patient/Chart Verification  Patient ID Verified: Verbal  Consents Confirmed: To be obtained in the Procedural area  H&P( within 30 days) Verified: To be obtained in the Procedural area  Allergies Reviewed: Yes  Anticoag/NSAID held?: NA  Currently on antibiotics?: No  Pregnancy denied?: NA  Does Patient Have a Prosthetic Device/Implant: No    Assessment:   1. Lumbar spondylosis        Plan: Right L3-5 RFA

## 2025-04-28 DIAGNOSIS — R60.0 BILATERAL LEG EDEMA: ICD-10-CM

## 2025-04-30 ENCOUNTER — HOSPITAL ENCOUNTER (OUTPATIENT)
Dept: RADIOLOGY | Facility: MEDICAL CENTER | Age: 65
Discharge: HOME | End: 2025-04-30
Payer: COMMERCIAL

## 2025-04-30 ENCOUNTER — TELEPHONE (OUTPATIENT)
Dept: PAIN MEDICINE | Facility: MEDICAL CENTER | Age: 65
End: 2025-04-30

## 2025-04-30 VITALS
TEMPERATURE: 98.2 F | OXYGEN SATURATION: 95 % | RESPIRATION RATE: 20 BRPM | DIASTOLIC BLOOD PRESSURE: 85 MMHG | HEART RATE: 80 BPM | SYSTOLIC BLOOD PRESSURE: 138 MMHG

## 2025-04-30 DIAGNOSIS — M47.816 LUMBAR SPONDYLOSIS: ICD-10-CM

## 2025-04-30 PROCEDURE — 64635 DESTROY LUMB/SAC FACET JNT: CPT | Performed by: PHYSICAL MEDICINE & REHABILITATION

## 2025-04-30 PROCEDURE — 64636 DESTROY L/S FACET JNT ADDL: CPT | Performed by: PHYSICAL MEDICINE & REHABILITATION

## 2025-04-30 RX ORDER — FUROSEMIDE 20 MG/1
20 TABLET ORAL AS NEEDED
Qty: 90 TABLET | Refills: 1 | Status: SHIPPED | OUTPATIENT
Start: 2025-04-30

## 2025-04-30 RX ADMIN — LIDOCAINE HYDROCHLORIDE 5 ML: 20 INJECTION, SOLUTION EPIDURAL; INFILTRATION; INTRACAUDAL; PERINEURAL at 10:56

## 2025-04-30 NOTE — H&P
"History of Present Illness: The patient is a 64 y.o. female who presents with complaints of left low back pain    Past Medical History:   Diagnosis Date    Adrenal insufficiency (Taos's disease) ([facility])     Anemia 09 15 2022    Was found in bloodwork done on that day    Anxiety 2019    Arthritis 2015    Bilateral pulmonary contusion 07/03/2020    Bipolar disorder     Cervical radiculopathy     Chest pain     seen in ER 9/18, dx with gas    Chronic back pain     Chronic pain disorder     lumbar    Closed head injury with brief loss of consciousness ([facility]) 10/15/2019    Contusion of right hand 07/03/2021    Depression 2019    Difficulty walking 9/2025    I feel like 4 toes on r are clubbing.    DVT, lower extremity ([facility])     1991 and 2019 now on eliquis    Exercises 5 to 6 times per week     5 days per week with weights/band and also goes to PT 2x/week    Fall down stairs 07/03/2020    Fibromyalgia     Fibromyalgia, primary     GERD (gastroesophageal reflux disease) 8/2/2024    Put on medication Omeprazole DR 40mg    Headache(784.0) 2018    I get migraines. See Neurologist for this.    Hematoma of parietal scalp 07/03/2020    History of Clostridioides difficile infection     History of MRSA infection     pt denies having this    History of recent fall 07/2021    \"possibly injured left shoulder'    History of TIAs     cannot remember details    Hypertension 2021    Seen Cardiologist and put me on medication    Hypokalemia     Intractable migraine without aura and without status migrainosus 10/02/2019    Left shoulder pain     \"not too bad\" goes to PT 2x/week    Migraine     Obesity 2019    Psychiatric disorder     Anxiety, major depression, bipolar    Spinal stenosis     Syncope 2014    orthostatic hypotension    Wears dentures     upper    Wears glasses        Past Surgical History:   Procedure Laterality Date    APPENDECTOMY      Removed    BARIATRIC SURGERY  2015    Bariatic Sleeve    CAST APPLICATION Left " 07/04/2020    Procedure: Application short-arm splint;  Surgeon: Sony Zhang MD;  [facility];  Service: Orthopedics    COLONOSCOPY  2022    Ordered as Routine by PCP    FL INJECTION LEFT SHOULDER (ARTHROGRAM)  11/10/2021    GASTRIC RESTRICTION SURGERY      Gastric Sleeve Nov 2015    HIP SURGERY  2016    Hip Replacement    HYSTERECTOMY  2004    DONALD    JOINT REPLACEMENT      Left Knee 2011 and Right Hip 2010    KNEE SURGERY  2015    Knee Replacement    OOPHORECTOMY      ORIF WRIST FRACTURE Left 07/04/2020    Procedure: Open reduction and internal fixation left radius and ulnar shaft fracture;  Surgeon: Sony Zhang MD;  [facility];  Service: Orthopedics    LA ARTHROPLASTY GLENOHUMERAL JOINT TOTAL SHOULDER Left 03/30/2021    Procedure: ARTHROPLASTY SHOULDER - anatomic;  Surgeon: Daryn Daniel MD;  [facility];  Service: Orthopedics    LA ARTHROPLASTY GLENOHUMERAL JOINT TOTAL SHOULDER Right 10/24/2023    Procedure: ANATOMIC VS REVERSE TOTAL SHOULDER ARTHROPLASTY, WITH BICEPS TENDONESIS;  Surgeon: Daryn Daniel MD;  [facility];  Service: Orthopedics    LA NATIVIDAD SHOULDER ARTHRPLSTY HUMERAL&GLENOID COMPNT Left 12/21/2021    Procedure: REVISION OF TOTAL SHOULDER ARTHROPLASTY TO REVERSE TOTAL SHOULDER ARTHROPLASTY;  Surgeon: Daryn Daniel MD;  [facility];  Service: Orthopedics         Current Outpatient Medications:     amLODIPine (NORVASC) 5 mg tablet, Take 1 tablet (5 mg total) by mouth daily, Disp: 90 tablet, Rfl: 1    apixaban (Eliquis) 5 mg, Take 1 tablet (5 mg total) by mouth 2 (two) times a day, Disp: 180 tablet, Rfl: 1    ARIPiprazole (ABILIFY) 5 mg tablet, TAKE 1 TABLET (5 MG TOTAL) BY MOUTH DAILY., Disp: 90 tablet, Rfl: 1    atorvastatin (LIPITOR) 40 mg tablet, Take 1 tablet (40 mg total) by mouth daily, Disp: 90 tablet, Rfl: 2    baclofen 10 mg tablet, Take 0.5 tablets (5 mg total) by mouth 3 (three) times a day as needed for  muscle spasms, Disp: 90 tablet, Rfl: 1    cholecalciferol (VITAMIN D3) 1,000 units tablet, , Disp: , Rfl:     folic acid (KP Folic Acid) 1 mg tablet, Take 2 tablets (2 mg total) by mouth daily, Disp: 180 tablet, Rfl: 1    furosemide (LASIX) 20 mg tablet, TAKE 1 TABLET (20 MG TOTAL) BY MOUTH AS NEEDED (LEG SWELLING) PT REPORTS CALLS HER [facility] CARDIOLOGIST DR RODRIGUES BEFORE TAKING, Disp: 90 tablet, Rfl: 1    gabapentin (NEURONTIN) 600 MG tablet, Take 1 tablet (600 mg total) by mouth 3 (three) times a day, Disp: 270 tablet, Rfl: 1    lamoTRIgine (LaMICtal) 200 MG tablet, Take 1 tablet (200 mg total) by mouth daily, Disp: 90 tablet, Rfl: 1    LORazepam (ATIVAN) 0.5 mg tablet, Take 1 tablet (0.5 mg total) by mouth daily as needed for anxiety, Disp: 15 tablet, Rfl: 0    meclizine (ANTIVERT) 12.5 MG tablet, Take 1 tablet (12.5 mg total) by mouth every 8 (eight) hours as needed for dizziness, Disp: 30 tablet, Rfl: 2    omeprazole (PriLOSEC) 40 MG capsule, Take 1 capsule (40 mg total) by mouth daily, Disp: 90 capsule, Rfl: 1    ondansetron (ZOFRAN-ODT) 4 mg disintegrating tablet, TAKE 1 TABLET BY MOUTH EVERY 8 HOURS AS NEEDED FOR NAUSEA AND VOMITING, Disp: 90 tablet, Rfl: 0    potassium chloride (K-DUR,KLOR-CON) 20 mEq tablet, Take 20 mEq by mouth daily   (Patient not taking: Reported on 1/15/2025), Disp: , Rfl:     topiramate (TOPAMAX) 25 mg tablet, Take 1 tablet (25 mg total) by mouth 2 (two) times a day, Disp: 180 tablet, Rfl: 1    Current Facility-Administered Medications:     lidocaine (PF) (XYLOCAINE-MPF) 2 % injection 5 mL, 5 mL, Perineural, Once, Ketan Swann,     Allergies   Allergen Reactions    Ketorolac Itching and Other (See Comments)     [toradol] Itching, hives    Mushroom Extract Complex - Food Allergy Hives    Oxycodone Other (See Comments)     Irritable ,severe mood change        Physical Exam:   Vitals:    04/30/25 1043   BP: 150/85   Pulse: 80   Resp: 20   Temp: 98.2 °F (36.8 °C)   SpO2: 94%     General:  Awake, Alert, Oriented x 3, Mood and affect appropriate  Respiratory: Respirations even and unlabored  Cardiovascular: Peripheral pulses intact; no edema  Musculoskeletal Exam: left low back pain    ASA Score: 3    Patient/Chart Verification  Patient ID Verified: Verbal  ID Band Applied: No  Consents Confirmed: To be obtained in the Procedural area  Interval H&P(within 24 hr) Complete (required for Outpatients and Surgery Admit only): To be obtained in the Procedural area  Allergies Reviewed: Yes  Anticoag/NSAID held?: NA (takes eliquis no need to hold for this procedure)  Currently on antibiotics?: No    Assessment:   1. Lumbar spondylosis        Plan: Left L3-5 RFA

## 2025-04-30 NOTE — DISCHARGE INSTR - LAB

## 2025-05-01 NOTE — TELEPHONE ENCOUNTER
S/w pt s/p RFA pt doing well today, just sore. RN encouraged pt to use ice and OTC meds to help treat. Pt denies any s/s of infection. Confirmed f/u appt and asked pt to call back with any issues prior.

## 2025-05-04 DIAGNOSIS — I82.491 ACUTE DEEP VEIN THROMBOSIS (DVT) OF OTHER SPECIFIED VEIN OF RIGHT LOWER EXTREMITY (HCC): ICD-10-CM

## 2025-05-04 DIAGNOSIS — I26.99 PE (PULMONARY THROMBOEMBOLISM) (HCC): ICD-10-CM

## 2025-05-04 DIAGNOSIS — Z79.01 CHRONIC ANTICOAGULATION: ICD-10-CM

## 2025-05-05 RX ORDER — APIXABAN 5 MG/1
5 TABLET, FILM COATED ORAL 2 TIMES DAILY
Qty: 180 TABLET | Refills: 3 | Status: SHIPPED | OUTPATIENT
Start: 2025-05-05

## 2025-05-14 ENCOUNTER — DOCUMENTATION (OUTPATIENT)
Dept: CASE MANAGEMENT | Facility: OTHER | Age: 65
End: 2025-05-14

## 2025-05-14 NOTE — MEDICAL HIGH UTILIZER
High Utilizer Care Plan for: Li Torre  Updated: 2025  Patient Name: Li Torre          MRN: [id number]       : 1960 Age: 64      Sex:  F   Utilization Background: She is a female with a history of migraine, Andrés's disease, Bipolar, Depression, Fibromyalgia, and HTN who presents to [facility] ([facility] [place]) and [facility] with migraine. She has 14 ER visits, 9 admission, and 2 transfers to [facility] for Ketamine Infusions in 2019. Discussed with Neurology reveals that patient does not feel much better even after prolonged hospitalization.     In the last 90 days: 0 encounters    Treatment Recommendations:  ED Recommendations: Recommendations as per neurology: Give migraine protocol: using steroid protocol d/t toradol allergy. Recommend calling her [place] Neurologist to schedule close outpatient follow up. It is noted that the patient will provide other complaints to the ER providers to get admitted and then will complain of migraine in the hospital.   Would not offer transfer for Ketamine Infusion to this patient as it has not worked in the past. This should be left up to Neurology to determine if this is appropriate.       Inpatient Recommendations: Early consultation with neurology. Avoid narcotics/sedating agents due to over sedation in the past       Outpatient recommendations: Needs close follow up with [facility]. Needs CM to make sure that patient does not run out of her meds as she has in the past.    Situation: Patient who presents frequently for migraines. It seems that she has started using other chief complaints to get admitted to the hospital for migraine treatment. As per neurology colleague her headache symptomatology does not usually change with treatment      PMH/PSH:  Migraine, Depression, Bipolar, Fibromyalgia, HTN, Hx of DVT      Assessment                              Drivers of repeated utilization:                 Symptomatology     Community Resources in place:     None - she has mentioned that she does not have a  for infusions  May need assistance with transportation and with medications   Patient Care Team:   Derrick Vasquez MD - PCP ([facility])  MOON Calle - Psych  MOON Hernandez/Luigi Jones MD - Neurology ([facility])  Altagracia Jade MD - Hematology ([facility])  Guillermo Hyde MD - Cardiology ([facility])  OP Care Manager: Kristi Kelley RN              Care plan date and owner: Terry Duckworth Jr., PA-C 10/31/2019         Reviewed with patient before discharge?

## 2025-05-30 DIAGNOSIS — M47.816 LUMBAR SPONDYLOSIS: ICD-10-CM

## 2025-05-30 DIAGNOSIS — M79.18 MYOFASCIAL PAIN SYNDROME: ICD-10-CM

## 2025-05-30 DIAGNOSIS — M79.18 MYOFASCIAL PAIN SYNDROME: Primary | ICD-10-CM

## 2025-05-30 DIAGNOSIS — I10 PRIMARY HYPERTENSION: ICD-10-CM

## 2025-05-30 DIAGNOSIS — E78.2 MIXED HYPERLIPIDEMIA: Chronic | ICD-10-CM

## 2025-05-30 RX ORDER — BACLOFEN 10 MG/1
TABLET ORAL
Qty: 135 TABLET | Refills: 2 | OUTPATIENT
Start: 2025-05-30

## 2025-05-30 RX ORDER — ATORVASTATIN CALCIUM 40 MG/1
40 TABLET, FILM COATED ORAL DAILY
Qty: 90 TABLET | Refills: 1 | Status: SHIPPED | OUTPATIENT
Start: 2025-05-30

## 2025-05-30 RX ORDER — BACLOFEN 10 MG/1
10 TABLET ORAL 3 TIMES DAILY PRN
Qty: 270 TABLET | Refills: 0 | Status: SHIPPED | OUTPATIENT
Start: 2025-05-30

## 2025-05-30 RX ORDER — AMLODIPINE BESYLATE 5 MG/1
5 TABLET ORAL DAILY
Qty: 90 TABLET | Refills: 1 | Status: SHIPPED | OUTPATIENT
Start: 2025-05-30

## 2025-05-30 NOTE — TELEPHONE ENCOUNTER
S/w pt, she is still taking the Baclofen 10 mg TID PRN. Pt said she discussed with JE at her proc appt increased from the 0.5 tab (5mg) to the 1 full tab (10 mg) and that seems to be working better. Pt said she takes it 2-3 times a day with no s/e. Pt uses [facility] on file, requesting 90 day supply if possible.

## 2025-06-16 ENCOUNTER — APPOINTMENT (OUTPATIENT)
Dept: RADIOLOGY | Facility: MEDICAL CENTER | Age: 65
End: 2025-06-16
Payer: COMMERCIAL

## 2025-06-16 ENCOUNTER — OFFICE VISIT (OUTPATIENT)
Dept: PAIN MEDICINE | Facility: MEDICAL CENTER | Age: 65
End: 2025-06-16
Payer: COMMERCIAL

## 2025-06-16 VITALS — BODY MASS INDEX: 37.25 KG/M2 | HEIGHT: 68 IN

## 2025-06-16 DIAGNOSIS — M54.41 CHRONIC BILATERAL LOW BACK PAIN WITH RIGHT-SIDED SCIATICA: Primary | ICD-10-CM

## 2025-06-16 DIAGNOSIS — M54.16 LUMBAR RADICULITIS: ICD-10-CM

## 2025-06-16 DIAGNOSIS — G89.29 CHRONIC BILATERAL LOW BACK PAIN WITH RIGHT-SIDED SCIATICA: Primary | ICD-10-CM

## 2025-06-16 DIAGNOSIS — G89.29 CHRONIC BILATERAL LOW BACK PAIN WITH RIGHT-SIDED SCIATICA: ICD-10-CM

## 2025-06-16 DIAGNOSIS — M54.41 CHRONIC BILATERAL LOW BACK PAIN WITH RIGHT-SIDED SCIATICA: ICD-10-CM

## 2025-06-16 DIAGNOSIS — M46.1 SACROILIITIS (HCC): ICD-10-CM

## 2025-06-16 PROCEDURE — G2211 COMPLEX E/M VISIT ADD ON: HCPCS

## 2025-06-16 PROCEDURE — 72114 X-RAY EXAM L-S SPINE BENDING: CPT

## 2025-06-16 PROCEDURE — 99214 OFFICE O/P EST MOD 30 MIN: CPT

## 2025-06-16 RX ORDER — METHYLPREDNISOLONE 4 MG/1
TABLET ORAL
Qty: 21 TABLET | Refills: 0 | Status: SHIPPED | OUTPATIENT
Start: 2025-06-16

## 2025-06-16 NOTE — PROGRESS NOTES
Name: Li Torre      : 1960      MRN: [id number]  Encounter Provider: Gabrielle Malone PA-C  Encounter Date: 2025   Encounter department: [facility]  :  Assessment & Plan  Chronic bilateral low back pain with right-sided sciatica    Orders:    XR spine lumbar complete w bending minimum 6 views; Future    Ambulatory referral to Physical Therapy; Future    methylPREDNISolone 4 MG tablet therapy pack; Use as directed on package    Lumbar radiculitis    Orders:    XR spine lumbar complete w bending minimum 6 views; Future    Ambulatory referral to Physical Therapy; Future    methylPREDNISolone 4 MG tablet therapy pack; Use as directed on package    Sacroiliitis (HCC)    Orders:    FL spine and pain procedure; Future        Schedule for repeat bilateral SIJ injections.  Complete risks and benefits including hyperglycemia, bleeding, infection, local tissue reaction, nerve injury, and allergic reaction were discussed. The approach was demonstrated using models and literature was provided. The patient was agreeable, verbalized an understanding, and wishes to proceed with scheduling the procedure.    Can consider repeating bilateral L3-5 RFA on or after the 1 year nelly if patient continues to report significant symptom relief/improvement in ADLs following this recent procedure.    Will obtain lumbar spine x-rays due to new and recent bilateral low back pain without injury.  Lumbar spine x-rays ordered.    Start Medrol Dosepak for acute flareup of low back pain.  Discussed with patient she should not take this medications while taking other NSAIDs such as ibuprofen and Aleve.  Patient agreeable and verbalized understanding.    At this time, I believe patient would benefit from attending formal physical therapy sessions for lumbar strengthening and stretching.  Patient agreeable.  Referral to physical therapy ordered.    Can consider updating lumbar spine MRI if patient continues to  report persistent/worsening low back pain despite attending physical therapy sessions.    Continue the use of gabapentin as prescribed by PCP.  Continue the use of baclofen as prescribed by pain management.  Patient states she tolerates these medications well without side effects.  No refills of this medication were needed at today's visit.    Follow-up in 8 weeks, or sooner if needed..    My impressions and treatment recommendations were discussed in detail with the patient who verbalized understanding and had no further questions.  Discharge instructions were provided. I personally saw and examined the patient and I agree with the above discussed plan of care.    History of Present Illness     Li Torre is a 64 y.o. female who presents for a follow up office after undergoing right L3-5 RFA on 4/16/2025 and left L3-5 RFA on 4/30/2025.  Patient reports experiencing 100% axial low back pain relief following this recent procedure.    Patient states that over the past week she has began to notice increased right sided low back pain which she states is different than her axial low back pain.  Denies recent injury or trauma to the low back and legs.  She describes pain as a constant pain which she rates 9/10 on the numeric rating scale.  Patient notes her pain is exacerbated with sitting, standing, walking, and positional changes such as sit to stand.  States that her pain is relieved with lying supine and applying heat therapy.  Admits radiating pain to the right groin and medial aspect of the right thigh, not passing the right knee.  Denies history of previous radicular symptoms.  Admits intermittent numbness of medial right thigh.  Denies weakness of bilateral lower extremities, saddle anesthesia, and bowel/bladder incontinence. Admits to the use of gabapentin 600 mg 3 times daily, baclofen twice daily, and Tylenol for symptom management.  Gabapentin, baclofen BID, tylenol    Patient reports previously  "undergoing bilateral SIJ injections on 9/25/2024.  Patient reports experiencing greater than 50% symptom relief lasting approximately 3 months prior to her pain gradually returning to baseline.    Admits to the daily use of Eliquis.  Denies history of diabetes.  Admits history of right GIOVANNA.    Review of Systems   Constitutional:  Negative for unexpected weight change.   HENT:  Negative for hearing loss.    Eyes:  Negative for visual disturbance.   Respiratory:  Negative for shortness of breath.    Cardiovascular:  Negative for leg swelling.   Gastrointestinal:  Negative for constipation.   Endocrine: Negative for polyuria.   Genitourinary:  Negative for difficulty urinating.   Musculoskeletal:  Positive for gait problem. Negative for joint swelling and myalgias.   Skin:  Negative for rash.   Neurological:  Negative for weakness and headaches.   Psychiatric/Behavioral:  Negative for decreased concentration. The patient is nervous/anxious.    All other systems reviewed and are negative.      Medical History Reviewed by provider this encounter:     .  Medications Ordered Prior to Encounter[1]      Objective   Ht 5' 8\" (1.727 m)   BMI 37.25 kg/m²      Pain Score:   9  Physical Exam  Constitutional: normal, well developed, well nourished, alert, in no distress and non-toxic and no overt pain behavior.  Eyes: anicteric  HEENT: grossly intact  Neck: supple, symmetric, trachea midline and no masses   Pulmonary: even and unlabored  Cardiovascular: No edema or pitting edema present  Skin: Normal without rashes or lesions and well hydrated  Psychiatric: Mood and affect appropriate  Neurologic: Cranial Nerves II-XII grossly intact, bilateral lower extremity strength is normal, negative seated straight leg raise bilaterally  Musculoskeletal: normal, except for tenderness to palpation along bilateral SI joints and right side of lower lumbar spine, pain complaints are not exacerbated with internal rotation of the right " hip    Radiology Results Review : No pertinent imaging studies reviewed.         [1]   Current Outpatient Medications on File Prior to Visit   Medication Sig Dispense Refill    amLODIPine (NORVASC) 5 mg tablet TAKE 1 TABLET (5 MG TOTAL) BY MOUTH DAILY. 90 tablet 1    ARIPiprazole (ABILIFY) 5 mg tablet TAKE 1 TABLET (5 MG TOTAL) BY MOUTH DAILY. 90 tablet 1    atorvastatin (LIPITOR) 40 mg tablet TAKE 1 TABLET BY MOUTH EVERY DAY 90 tablet 1    baclofen 10 mg tablet Take 1 tablet (10 mg total) by mouth 3 (three) times a day as needed for muscle spasms 270 tablet 0    cholecalciferol (VITAMIN D3) 1,000 units tablet       Eliquis 5 MG TAKE 1 TABLET TWICE DAILY 180 tablet 3    folic acid (KP Folic Acid) 1 mg tablet Take 2 tablets (2 mg total) by mouth daily 180 tablet 1    furosemide (LASIX) 20 mg tablet TAKE 1 TABLET (20 MG TOTAL) BY MOUTH AS NEEDED (LEG SWELLING) PT REPORTS CALLS HER [facility] CARDIOLOGIST DR RODRIGUES BEFORE TAKING 90 tablet 1    gabapentin (NEURONTIN) 600 MG tablet Take 1 tablet (600 mg total) by mouth 3 (three) times a day 270 tablet 1    lamoTRIgine (LaMICtal) 200 MG tablet Take 1 tablet (200 mg total) by mouth daily 90 tablet 1    LORazepam (ATIVAN) 0.5 mg tablet Take 1 tablet (0.5 mg total) by mouth daily as needed for anxiety 15 tablet 0    meclizine (ANTIVERT) 12.5 MG tablet Take 1 tablet (12.5 mg total) by mouth every 8 (eight) hours as needed for dizziness 30 tablet 2    omeprazole (PriLOSEC) 40 MG capsule Take 1 capsule (40 mg total) by mouth daily 90 capsule 1    ondansetron (ZOFRAN-ODT) 4 mg disintegrating tablet TAKE 1 TABLET BY MOUTH EVERY 8 HOURS AS NEEDED FOR NAUSEA AND VOMITING 90 tablet 0    potassium chloride (K-DUR,KLOR-CON) 20 mEq tablet Take 20 mEq by mouth daily        topiramate (TOPAMAX) 25 mg tablet Take 1 tablet (25 mg total) by mouth 2 (two) times a day 180 tablet 1     No current facility-administered medications on file prior to visit.

## 2025-06-16 NOTE — ASSESSMENT & PLAN NOTE
Orders:    XR spine lumbar complete w bending minimum 6 views; Future    Ambulatory referral to Physical Therapy; Future    methylPREDNISolone 4 MG tablet therapy pack; Use as directed on package

## 2025-06-18 ENCOUNTER — TELEPHONE (OUTPATIENT)
Age: 65
End: 2025-06-18

## 2025-06-18 NOTE — TELEPHONE ENCOUNTER
Caller: Maki [place]    Doctor: Dr. Gabrielle Malone    Reason for call: XRAY results are still pending . Pt was advised     Call back#: [phone]   Pt states she did not receive MRI orders when she was here for appt 4/25/23 with Dr Shannon.  Please make sure PA is done, if needed, and mail orders/referrals to pt.  Thank you.

## 2025-06-23 ENCOUNTER — RESULTS FOLLOW-UP (OUTPATIENT)
Dept: PAIN MEDICINE | Facility: MEDICAL CENTER | Age: 65
End: 2025-06-23

## 2025-06-24 ENCOUNTER — RA CDI HCC (OUTPATIENT)
Dept: OTHER | Facility: HOSPITAL | Age: 65
End: 2025-06-24

## 2025-06-24 NOTE — TELEPHONE ENCOUNTER
Caller: jaya Gambion    Doctor: Dr. Swann    Reason for call: pt returning the nurse's call    Call back#: call transferred to the nurse

## 2025-07-01 ENCOUNTER — OFFICE VISIT (OUTPATIENT)
Age: 65
End: 2025-07-01
Payer: COMMERCIAL

## 2025-07-01 ENCOUNTER — APPOINTMENT (OUTPATIENT)
Dept: RADIOLOGY | Facility: MEDICAL CENTER | Age: 65
End: 2025-07-01
Attending: FAMILY MEDICINE
Payer: COMMERCIAL

## 2025-07-01 VITALS
TEMPERATURE: 97.6 F | OXYGEN SATURATION: 97 % | SYSTOLIC BLOOD PRESSURE: 126 MMHG | HEART RATE: 101 BPM | BODY MASS INDEX: 37.13 KG/M2 | WEIGHT: 245 LBS | DIASTOLIC BLOOD PRESSURE: 82 MMHG | HEIGHT: 68 IN

## 2025-07-01 DIAGNOSIS — F31.81 BIPOLAR II DISORDER (HCC): ICD-10-CM

## 2025-07-01 DIAGNOSIS — M25.551 RIGHT HIP PAIN: ICD-10-CM

## 2025-07-01 DIAGNOSIS — M54.16 LUMBAR RADICULITIS: ICD-10-CM

## 2025-07-01 DIAGNOSIS — F31.9 BIPOLAR DEPRESSION (HCC): ICD-10-CM

## 2025-07-01 DIAGNOSIS — G62.9 PERIPHERAL POLYNEUROPATHY: Primary | ICD-10-CM

## 2025-07-01 DIAGNOSIS — R26.2 AMBULATORY DYSFUNCTION: ICD-10-CM

## 2025-07-01 PROCEDURE — G2211 COMPLEX E/M VISIT ADD ON: HCPCS | Performed by: FAMILY MEDICINE

## 2025-07-01 PROCEDURE — 99214 OFFICE O/P EST MOD 30 MIN: CPT | Performed by: FAMILY MEDICINE

## 2025-07-01 PROCEDURE — 73502 X-RAY EXAM HIP UNI 2-3 VIEWS: CPT

## 2025-07-01 RX ORDER — LORAZEPAM 0.5 MG/1
0.5 TABLET ORAL DAILY PRN
Qty: 15 TABLET | Refills: 1 | Status: SHIPPED | OUTPATIENT
Start: 2025-07-01

## 2025-07-01 RX ORDER — ARIPIPRAZOLE 5 MG/1
7.5 TABLET ORAL DAILY
Qty: 135 TABLET | Refills: 1 | Status: SHIPPED | OUTPATIENT
Start: 2025-07-01

## 2025-07-03 NOTE — ASSESSMENT & PLAN NOTE
[place] was [place] and medications were refilled discussed supportive care and return parameters  Orders:    LORazepam (ATIVAN) 0.5 mg tablet; Take 1 tablet (0.5 mg total) by mouth daily as needed for anxiety

## 2025-07-03 NOTE — PROGRESS NOTES
Name: Li Torre      : 1960      MRN: [id number]  Encounter Provider: Derrick Vasquez MD  Encounter Date: 2025   Encounter department: [facility] PRIMARY CARE  :  Assessment & Plan  Bipolar depression (HCC)  [place] was [place] and medications were refilled discussed supportive care and return parameters  Orders:    LORazepam (ATIVAN) 0.5 mg tablet; Take 1 tablet (0.5 mg total) by mouth daily as needed for anxiety    Peripheral polyneuropathy  Refer to PT along with physiatry discussed abortive care return parameters otherwise  Orders:    Ambulatory Referral to Physical Therapy; Future    Ambulatory Referral to Physiatry; Future    Lumbar radiculitis  Refer to PT along with physiatry discussed abortive care return parameters otherwise  Orders:    Ambulatory Referral to Physical Therapy; Future    Ambulatory Referral to Physiatry; Future    Ambulatory dysfunction  Refer to PT along with physiatry discussed abortive care return parameters otherwise  Orders:    Ambulatory Referral to Physical Therapy; Future    Ambulatory Referral to Physiatry; Future    Bipolar II disorder (HCC)  Continue meds. And increase dose. Discussed supportive care and return parameters.   Orders:    ARIPiprazole (ABILIFY) 5 mg tablet; Take 1.5 tablets (7.5 mg total) by mouth daily    Right hip pain  Refer to PT along with physiatry discussed abortive care return parameters otherwise  Orders:    XR hip/pelv 2-3 vws right if performed; Future           History of Present Illness   Patient is a 64-year-old woman who presents for follow-up on bipolar depression polyneuropathy right hip pain along with lumbar radicular symptoms some and ambulatory dysfunction patient complaining of some breakthrough anxiety and balance issues no fevers chills nausea vomiting    Depression    Leg Pain       Review of Systems   Constitutional: Negative.    HENT: Negative.     Eyes: Negative.    Respiratory: Negative.    "  Cardiovascular: Negative.    Gastrointestinal: Negative.    Endocrine: Negative.    Genitourinary: Negative.    Musculoskeletal: Negative.    Allergic/Immunologic: Negative.    Neurological: Negative.    Hematological: Negative.    Psychiatric/Behavioral:  Positive for depression.    All other systems reviewed and are negative.      Objective   /82 (BP Location: Left arm, Patient Position: Sitting, Cuff Size: Large)   Pulse 101   Temp 97.6 °F (36.4 °C) (Temporal)   Ht 5' 8\" (1.727 m)   Wt 111 kg (245 lb)   SpO2 97%   BMI 37.25 kg/m²      Physical Exam  Constitutional:       General: She is not in acute distress.     Appearance: She is well-developed. She is not diaphoretic.   HENT:      Head: Normocephalic and atraumatic.      Right Ear: External ear normal.      Left Ear: External ear normal.      Nose: Nose normal.      Mouth/Throat:      Pharynx: No oropharyngeal exudate.     Eyes:      General:         Right eye: No discharge.         Left eye: No discharge.      Conjunctiva/sclera: Conjunctivae normal.      Pupils: Pupils are equal, round, and reactive to light.     Neck:      Thyroid: No thyromegaly.      Trachea: No tracheal deviation.     Cardiovascular:      Rate and Rhythm: Normal rate and regular rhythm.      Heart sounds: Normal heart sounds. No murmur heard.     No friction rub. No gallop.   Pulmonary:      Effort: Pulmonary effort is normal. No respiratory distress.      Breath sounds: Normal breath sounds.   Abdominal:      General: There is no distension.      Palpations: Abdomen is soft.      Tenderness: There is no abdominal tenderness. There is no guarding or rebound.     Musculoskeletal:         General: Normal range of motion.   Lymphadenopathy:      Cervical: No cervical adenopathy.     Skin:     General: Skin is warm.     Neurological:      Mental Status: She is alert and oriented to person, place, and time.      Cranial Nerves: No cranial nerve deficit.     Psychiatric:         " Behavior: Behavior normal.         Thought Content: Thought content normal.         Judgment: Judgment normal.

## 2025-07-03 NOTE — ASSESSMENT & PLAN NOTE
Refer to PT along with physiatry discussed abortive care return parameters otherwise  Orders:    XR hip/pelv 2-3 vws right if performed; Future

## 2025-07-03 NOTE — ASSESSMENT & PLAN NOTE
Refer to PT along with physiatry discussed abortive care return parameters otherwise  Orders:    Ambulatory Referral to Physical Therapy; Future    Ambulatory Referral to Physiatry; Future

## 2025-07-03 NOTE — ASSESSMENT & PLAN NOTE
Continue meds. And increase dose. Discussed supportive care and return parameters.   Orders:    ARIPiprazole (ABILIFY) 5 mg tablet; Take 1.5 tablets (7.5 mg total) by mouth daily

## 2025-07-06 DIAGNOSIS — K21.9 GASTROESOPHAGEAL REFLUX DISEASE WITHOUT ESOPHAGITIS: ICD-10-CM

## 2025-07-08 RX ORDER — OMEPRAZOLE 40 MG/1
40 CAPSULE, DELAYED RELEASE ORAL DAILY
Qty: 90 CAPSULE | Refills: 1 | Status: SHIPPED | OUTPATIENT
Start: 2025-07-08

## 2025-07-15 ENCOUNTER — HOSPITAL ENCOUNTER (OUTPATIENT)
Dept: RADIOLOGY | Facility: MEDICAL CENTER | Age: 65
Discharge: HOME | End: 2025-07-15
Payer: COMMERCIAL

## 2025-07-15 VITALS
SYSTOLIC BLOOD PRESSURE: 134 MMHG | RESPIRATION RATE: 18 BRPM | HEART RATE: 89 BPM | DIASTOLIC BLOOD PRESSURE: 80 MMHG | OXYGEN SATURATION: 95 % | TEMPERATURE: 97.1 F

## 2025-07-15 DIAGNOSIS — M46.1 SACROILIITIS (HCC): ICD-10-CM

## 2025-07-15 PROCEDURE — 27096 INJECT SACROILIAC JOINT: CPT | Performed by: PHYSICAL MEDICINE & REHABILITATION

## 2025-07-15 RX ORDER — ROPIVACAINE HYDROCHLORIDE 2 MG/ML
3 INJECTION, SOLUTION EPIDURAL; INFILTRATION; PERINEURAL ONCE
Status: COMPLETED | OUTPATIENT
Start: 2025-07-15 | End: 2025-07-15

## 2025-07-15 RX ORDER — METHYLPREDNISOLONE ACETATE 40 MG/ML
80 INJECTION, SUSPENSION INTRA-ARTICULAR; INTRALESIONAL; INTRAMUSCULAR; PARENTERAL; SOFT TISSUE ONCE
Status: COMPLETED | OUTPATIENT
Start: 2025-07-15 | End: 2025-07-15

## 2025-07-15 RX ADMIN — METHYLPREDNISOLONE ACETATE 80 MG: 40 INJECTION, SUSPENSION INTRA-ARTICULAR; INTRALESIONAL; INTRAMUSCULAR; INTRASYNOVIAL; SOFT TISSUE at 13:16

## 2025-07-15 RX ADMIN — IOHEXOL 1 ML: 300 INJECTION, SOLUTION INTRAVENOUS at 13:16

## 2025-07-15 RX ADMIN — ROPIVACAINE HYDROCHLORIDE 3 ML: 2 INJECTION, SOLUTION EPIDURAL; INFILTRATION at 13:16

## 2025-07-16 DIAGNOSIS — F31.81 BIPOLAR II DISORDER (HCC): ICD-10-CM

## 2025-07-17 ENCOUNTER — TELEPHONE (OUTPATIENT)
Dept: NEUROLOGY | Facility: CLINIC | Age: 65
End: 2025-07-17

## 2025-07-17 RX ORDER — LAMOTRIGINE 200 MG/1
200 TABLET ORAL DAILY
Qty: 90 TABLET | Refills: 1 | Status: SHIPPED | OUTPATIENT
Start: 2025-07-17

## 2025-07-18 ENCOUNTER — OFFICE VISIT (OUTPATIENT)
Age: 65
End: 2025-07-18
Payer: COMMERCIAL

## 2025-07-18 VITALS
HEART RATE: 82 BPM | OXYGEN SATURATION: 98 % | TEMPERATURE: 97.7 F | BODY MASS INDEX: 37.25 KG/M2 | DIASTOLIC BLOOD PRESSURE: 84 MMHG | SYSTOLIC BLOOD PRESSURE: 128 MMHG | HEIGHT: 68 IN

## 2025-07-18 DIAGNOSIS — Z00.00 MEDICARE ANNUAL WELLNESS VISIT, SUBSEQUENT: ICD-10-CM

## 2025-07-18 DIAGNOSIS — F31.9 BIPOLAR DEPRESSION (HCC): ICD-10-CM

## 2025-07-18 DIAGNOSIS — G47.09 OTHER INSOMNIA: ICD-10-CM

## 2025-07-18 DIAGNOSIS — Z12.31 ENCOUNTER FOR SCREENING MAMMOGRAM FOR BREAST CANCER: ICD-10-CM

## 2025-07-18 DIAGNOSIS — G43.509 PERSISTENT MIGRAINE AURA WITHOUT CEREBRAL INFARCTION AND WITHOUT STATUS MIGRAINOSUS, NOT INTRACTABLE: Primary | ICD-10-CM

## 2025-07-18 PROCEDURE — G0439 PPPS, SUBSEQ VISIT: HCPCS | Performed by: FAMILY MEDICINE

## 2025-07-18 PROCEDURE — 99214 OFFICE O/P EST MOD 30 MIN: CPT | Performed by: FAMILY MEDICINE

## 2025-07-18 PROCEDURE — G2211 COMPLEX E/M VISIT ADD ON: HCPCS | Performed by: FAMILY MEDICINE

## 2025-07-18 RX ORDER — LORAZEPAM 0.5 MG/1
0.5 TABLET ORAL DAILY PRN
Qty: 15 TABLET | Refills: 1 | Status: SHIPPED | OUTPATIENT
Start: 2025-07-18

## 2025-07-18 NOTE — ASSESSMENT & PLAN NOTE
{If prescribing CGRP gepants (Nurtec, Ubrelvy, Qulipta) or monoclonal antibodies (Emagality, Ajovy, Aimovig) that currently do not have a prior auth on file, click here to fill out prior auth smartform and then hit F2 with this smartlist to insert prior auth documentation (Optional):[id number]}  Stable, continue meds. Discussed supportive care and return parameters.

## 2025-07-18 NOTE — PROGRESS NOTES
Name: Li Torre      : 1960      MRN: [id number]  Encounter Provider: Derrick Vasquez MD  Encounter Date: 2025   Encounter department: [facility] PRIMARY CARE  :  Assessment & Plan  Encounter for screening mammogram for breast cancer    Orders:    Mammo screening bilateral w 3d and cad; Future    Bipolar depression (HCC)  Stable, continue meds. Discussed supportive care and return parameters. PAPDMP reviewed and refilled.  Orders:    LORazepam (ATIVAN) 0.5 mg tablet; Take 1 tablet (0.5 mg total) by mouth daily as needed for anxiety    Persistent migraine aura without cerebral infarction and without status migrainosus, not intractable    Stable, continue meds. Discussed supportive care and return parameters.        Other insomnia  Stable, continue meds. Discussed supportive care and return parameters.        Medicare annual wellness visit, subsequent  Medicare Annual well visit. Discussed various safety and health maintenance issues including healthy diet like the Mediterranean diet, exercise, ample sleep, stress reduction, and healthy weight as tolerated. Discussed supportive care and return parameters.           Preventive health issues were discussed with patient, and age appropriate screening tests were ordered as noted in patient's After Visit Summary. Personalized health advice and appropriate referrals for health education or preventive services given if needed, as noted in patient's After Visit Summary.    History of Present Illness     Patient is a 63 y/o woman who presents for follow-up on Bipolar d/o, migraines and insomnia admits being stable on meds. And denies acute complaints other than some sleep issues no fevers chills nausea or vomiting. Pt also here for Medicare AWV admits being active eats and sleeps well.       Patient Care Team:  Derrick Vasquez MD as PCP - General (Family Medicine)  Kristi Kelley as RN Care Manager (Care Coordination)  Saloni Ralph,  SIRISHA (Hematology and Oncology)    Review of Systems   Constitutional: Negative.    HENT: Negative.     Eyes: Negative.    Respiratory: Negative.     Cardiovascular: Negative.    Gastrointestinal: Negative.    Endocrine: Negative.    Genitourinary: Negative.    Musculoskeletal: Negative.    Allergic/Immunologic: Negative.    Neurological: Negative.    Hematological: Negative.    Psychiatric/Behavioral: Negative.     All other systems reviewed and are negative.    Medical History Reviewed by provider this encounter:  Tobacco  Allergies  Meds  Problems  Med Hx  Surg Hx  Fam Hx       Annual Wellness Visit Questionnaire   Li is here for her Subsequent Wellness visit. Last Medicare Wellness visit information reviewed, patient interviewed and updates made to the record today.      Health Risk Assessment:   Patient rates overall health as good. Patient feels that their physical health rating is same. Patient is satisfied with their life. Eyesight was rated as same. Hearing was rated as same. Patient feels that their emotional and mental health rating is same. Patients states they are never, rarely angry. Patient states they are never, rarely unusually tired/fatigued. Pain experienced in the last 7 days has been none. Patient states that she has experienced no weight loss or gain in last 6 months.     Depression Screening:   PHQ-9 Score: 1      Fall Risk Screening:   In the past year, patient has experienced: no history of falling in past year      Urinary Incontinence Screening:   Patient has not leaked urine accidently in the last six months.     Home Safety:  Patient does not have trouble with stairs inside or outside of their home. Patient has working smoke alarms and has working carbon monoxide detector. Home safety hazards include: none.     Nutrition:   Current diet is Regular.     Medications:   Patient is currently taking over-the-counter supplements. OTC medications include: see medication list.  Patient is able to manage medications.     Activities of Daily Living (ADLs)/Instrumental Activities of Daily Living (IADLs):   Walk and transfer into and out of bed and chair?: Yes  Dress and groom yourself?: Yes    Bathe or shower yourself?: Yes    Feed yourself? Yes  Do your laundry/housekeeping?: Yes  Manage your money, pay your bills and track your expenses?: Yes  Make your own meals?: Yes    Do your own shopping?: Yes    Previous Hospitalizations:   Any hospitalizations or ED visits within the last 12 months?: No      Advance Care Planning:   Living will: No    Durable POA for healthcare: No    Advanced directive: No      Cognitive Screening:   Provider or family/friend/caregiver concerned regarding cognition?: No    Preventive Screenings      Cardiovascular Screening:    General: Screening Not Indicated and History Lipid Disorder      Diabetes Screening:     General: Screening Current      Colorectal Cancer Screening:     General: Screening Current      Breast Cancer Screening:     General: Risks and Benefits Discussed    Due for: Mammogram        Cervical Cancer Screening:    General: Screening Not Indicated      Osteoporosis Screening:    General: Screening Current      Abdominal Aortic Aneurysm (AAA) Screening:        General: Screening Not Indicated      Lung Cancer Screening:     General: Screening Not Indicated      Hepatitis C Screening:    General: Screening Current    Screening, Brief Intervention, and Referral to Treatment (SBIRT)     Screening  Typical number of drinks in a day: 0  Typical number of drinks in a week: 0  Interpretation: Low risk drinking behavior.    AUDIT-C Screenin) How often did you have a drink containing alcohol in the past year? never  2) How many drinks did you have on a typical day when you were drinking in the past year? 0  3) How often did you have 6 or more drinks on one occasion in the past year? never    AUDIT-C Score: 0  Interpretation: Score 0-2 (female):  "Negative screen for alcohol misuse    Single Item Drug Screening:  How often have you used an illegal drug (including marijuana) or a prescription medication for non-medical reasons in the past year? never    Single Item Drug Screen Score: 0  Interpretation: Negative screen for possible drug use disorder    Social Drivers of Health     Food Insecurity: No Food Insecurity (7/18/2025)    Nursing - Inadequate Food Risk Classification     Worried About Running Out of Food in the Last Year: Never true     Ran Out of Food in the Last Year: Never true   Transportation Needs: No Transportation Needs (7/18/2025)    PRAPARE - Transportation     Lack of Transportation (Medical): No     Lack of Transportation (Non-Medical): No   Housing Stability: Low Risk  (7/18/2025)    Housing Stability Vital Sign     Unable to Pay for Housing in the Last Year: No     Number of Times Moved in the Last Year: 0     Homeless in the Last Year: No   Utilities: Not At Risk (7/18/2025)    [facility] Utilities     Threatened with loss of utilities: No     No results found.    Objective   /84 (BP Location: Right arm, Patient Position: Sitting, Cuff Size: Large)   Pulse 82   Temp 97.7 °F (36.5 °C) (Temporal)   Ht 5' 8\" (1.727 m)   SpO2 98%   BMI 37.25 kg/m²     Physical Exam  Constitutional:       General: She is not in acute distress.     Appearance: She is well-developed. She is not diaphoretic.   HENT:      Head: Normocephalic and atraumatic.      Right Ear: External ear normal.      Left Ear: External ear normal.      Nose: Nose normal.      Mouth/Throat:      Pharynx: No oropharyngeal exudate.     Eyes:      General:         Right eye: No discharge.         Left eye: No discharge.      Conjunctiva/sclera: Conjunctivae normal.      Pupils: Pupils are equal, round, and reactive to light.     Neck:      Thyroid: No thyromegaly.      Trachea: No tracheal deviation.     Cardiovascular:      Rate and Rhythm: Normal rate and regular rhythm.      " Heart sounds: Normal heart sounds. No murmur heard.     No friction rub. No gallop.   Pulmonary:      Effort: Pulmonary effort is normal. No respiratory distress.      Breath sounds: Normal breath sounds.   Abdominal:      General: There is no distension.      Palpations: Abdomen is soft.      Tenderness: There is no abdominal tenderness. There is no guarding or rebound.     Musculoskeletal:         General: Normal range of motion.   Lymphadenopathy:      Cervical: No cervical adenopathy.     Skin:     General: Skin is warm.     Neurological:      Mental Status: She is alert and oriented to person, place, and time.      Cranial Nerves: No cranial nerve deficit.     Psychiatric:         Behavior: Behavior normal.         Thought Content: Thought content normal.         Judgment: Judgment normal.

## 2025-07-18 NOTE — ASSESSMENT & PLAN NOTE
Stable, continue meds. Discussed supportive care and return parameters. PAPDMP reviewed and refilled.  Orders:    LORazepam (ATIVAN) 0.5 mg tablet; Take 1 tablet (0.5 mg total) by mouth daily as needed for anxiety

## 2025-07-22 DIAGNOSIS — F31.81 BIPOLAR II DISORDER (HCC): ICD-10-CM

## 2025-07-22 DIAGNOSIS — E66.01 SEVERE OBESITY (BMI 35.0-39.9) WITH COMORBIDITY (HCC): ICD-10-CM

## 2025-07-23 ENCOUNTER — TELEPHONE (OUTPATIENT)
Age: 65
End: 2025-07-23

## 2025-07-23 RX ORDER — TOPIRAMATE 25 MG/1
25 TABLET, FILM COATED ORAL 2 TIMES DAILY
Qty: 180 TABLET | Refills: 1 | Status: SHIPPED | OUTPATIENT
Start: 2025-07-23

## 2025-07-23 RX ORDER — GABAPENTIN 600 MG/1
600 TABLET ORAL 3 TIMES DAILY
Qty: 270 TABLET | Refills: 1 | Status: SHIPPED | OUTPATIENT
Start: 2025-07-23

## 2025-07-23 NOTE — TELEPHONE ENCOUNTER
Called patient and LMOM stating that I am calling in regards to scheduling a consult appt with Dr. Lane in the [facility] location and listed a few dates. I then asked for a call back to get the appt scheduled.

## 2025-07-26 ENCOUNTER — PATIENT OUTREACH (OUTPATIENT)
Dept: CASE MANAGEMENT | Facility: OTHER | Age: 65
End: 2025-07-26

## 2025-07-26 NOTE — PROGRESS NOTES
PMHx- migraine, Andrés's dx, BPD, depression, Fibromyalgia, HTN     Outpatient Care Management Note   Chart reviewed, Care Plan reviewed, Hospital Risk Assessment completed  High Utilizer Care Plan last updated- 5/14/25  Patient had 0 # ED visits within last 90 days (as per EPIC EMR at time of review on 7/26/25. Patient no longer allows outside access via care everywhere per notice on icon)   Following outpatient with her PCP (last OV 7/18/25- per note, BPD stable and ativan refilled for PRN anxiety, migraine is stable and to continue current regimen)   Seen by [facility] in [place] 7/15/25 - c/o lower back pain - received Depo-medrol inj and Naropin injection   Scheduled to see Neurology in Consultation on 10/28/25    Upcoming appointments -   GI OV 8/4/25  Hem Onc OV 9/19/25  Neurology Consult 10/28/25  PCP Follow up 1/14/26    This Outpatient RN CM covering Outpatient RN CM-Kristi O    All actions/communications routed back to Primary Outpatient RN CM   once encounter is signed    Next chart review scheduled

## 2025-07-29 ENCOUNTER — TELEPHONE (OUTPATIENT)
Dept: PAIN MEDICINE | Facility: CLINIC | Age: 65
End: 2025-07-29

## 2025-07-30 ENCOUNTER — TELEPHONE (OUTPATIENT)
Age: 65
End: 2025-07-30

## 2025-07-31 ENCOUNTER — PATIENT MESSAGE (OUTPATIENT)
Age: 65
End: 2025-07-31

## 2025-07-31 ENCOUNTER — TELEPHONE (OUTPATIENT)
Age: 65
End: 2025-07-31

## 2025-07-31 ENCOUNTER — APPOINTMENT (OUTPATIENT)
Age: 65
End: 2025-07-31
Payer: COMMERCIAL

## 2025-07-31 ENCOUNTER — OFFICE VISIT (OUTPATIENT)
Age: 65
End: 2025-07-31
Payer: COMMERCIAL

## 2025-07-31 VITALS
OXYGEN SATURATION: 96 % | SYSTOLIC BLOOD PRESSURE: 122 MMHG | TEMPERATURE: 98.1 F | BODY MASS INDEX: 37.25 KG/M2 | DIASTOLIC BLOOD PRESSURE: 82 MMHG | HEART RATE: 84 BPM | HEIGHT: 68 IN

## 2025-07-31 DIAGNOSIS — K21.9 GASTROESOPHAGEAL REFLUX DISEASE WITHOUT ESOPHAGITIS: ICD-10-CM

## 2025-07-31 DIAGNOSIS — E83.00 LOW CERULOPLASMIN LEVEL: ICD-10-CM

## 2025-07-31 DIAGNOSIS — E66.01 OBESITY, MORBID (HCC): ICD-10-CM

## 2025-07-31 DIAGNOSIS — E05.90 SUBCLINICAL HYPERTHYROIDISM: ICD-10-CM

## 2025-07-31 DIAGNOSIS — L98.9 CHANGING SKIN LESION: Primary | ICD-10-CM

## 2025-07-31 DIAGNOSIS — E66.01 SEVERE OBESITY (BMI 35.0-39.9) WITH COMORBIDITY (HCC): ICD-10-CM

## 2025-07-31 DIAGNOSIS — M25.551 RIGHT HIP PAIN: ICD-10-CM

## 2025-07-31 DIAGNOSIS — L98.9 CHANGING SKIN LESION: ICD-10-CM

## 2025-07-31 DIAGNOSIS — E78.2 MIXED HYPERLIPIDEMIA: Chronic | ICD-10-CM

## 2025-07-31 PROCEDURE — G2211 COMPLEX E/M VISIT ADD ON: HCPCS | Performed by: FAMILY MEDICINE

## 2025-07-31 PROCEDURE — 99214 OFFICE O/P EST MOD 30 MIN: CPT | Performed by: FAMILY MEDICINE

## 2025-07-31 PROCEDURE — 82390 ASSAY OF CERULOPLASMIN: CPT

## 2025-07-31 PROCEDURE — 36415 COLL VENOUS BLD VENIPUNCTURE: CPT

## 2025-07-31 RX ORDER — PREDNISONE 20 MG/1
40 TABLET ORAL DAILY
Qty: 10 TABLET | Refills: 0 | Status: SHIPPED | OUTPATIENT
Start: 2025-07-31 | End: 2025-08-05

## 2025-07-31 RX ORDER — TIRZEPATIDE 2.5 MG/.5ML
2.5 INJECTION, SOLUTION SUBCUTANEOUS WEEKLY
Qty: 2 ML | Refills: 0 | Status: SHIPPED | OUTPATIENT
Start: 2025-07-31 | End: 2025-08-28

## 2025-07-31 RX ORDER — TIRZEPATIDE 2.5 MG/.5ML
2.5 INJECTION, SOLUTION SUBCUTANEOUS WEEKLY
Refills: 0 | OUTPATIENT
Start: 2025-07-31 | End: 2025-08-28

## 2025-08-01 ENCOUNTER — TELEPHONE (OUTPATIENT)
Age: 65
End: 2025-08-01

## 2025-08-01 PROBLEM — R73.9 ELEVATED BLOOD SUGAR: Status: ACTIVE | Noted: 2025-08-01

## 2025-08-02 LAB — CERULOPLASMIN SERPL-MCNC: 13 MG/DL (ref 19–39)

## 2025-08-04 ENCOUNTER — CLINICAL SUPPORT (OUTPATIENT)
Age: 65
End: 2025-08-04

## 2025-08-04 ENCOUNTER — OFFICE VISIT (OUTPATIENT)
Dept: GASTROENTEROLOGY | Facility: MEDICAL CENTER | Age: 65
End: 2025-08-04
Payer: COMMERCIAL

## 2025-08-04 ENCOUNTER — TELEPHONE (OUTPATIENT)
Dept: GASTROENTEROLOGY | Facility: MEDICAL CENTER | Age: 65
End: 2025-08-04

## 2025-08-04 ENCOUNTER — APPOINTMENT (OUTPATIENT)
Age: 65
End: 2025-08-04
Payer: COMMERCIAL

## 2025-08-04 VITALS
HEIGHT: 68 IN | BODY MASS INDEX: 38.19 KG/M2 | WEIGHT: 252 LBS | HEART RATE: 88 BPM | TEMPERATURE: 98.4 F | DIASTOLIC BLOOD PRESSURE: 82 MMHG | OXYGEN SATURATION: 98 % | SYSTOLIC BLOOD PRESSURE: 143 MMHG

## 2025-08-04 VITALS — WEIGHT: 251 LBS | HEIGHT: 68 IN | BODY MASS INDEX: 38.04 KG/M2

## 2025-08-04 DIAGNOSIS — E83.00 LOW CERULOPLASMIN LEVEL: ICD-10-CM

## 2025-08-04 DIAGNOSIS — E66.01 SEVERE OBESITY (BMI 35.0-39.9) WITH COMORBIDITY (HCC): Primary | ICD-10-CM

## 2025-08-04 DIAGNOSIS — E83.00 LOW CERULOPLASMIN LEVEL: Primary | ICD-10-CM

## 2025-08-04 DIAGNOSIS — R74.8 ELEVATED ALKALINE PHOSPHATASE LEVEL: Primary | ICD-10-CM

## 2025-08-04 PROCEDURE — 99214 OFFICE O/P EST MOD 30 MIN: CPT | Performed by: NURSE PRACTITIONER

## 2025-08-04 PROCEDURE — PBNCHG PB NO CHARGE PLACEHOLDER

## 2025-08-04 PROCEDURE — 82525 ASSAY OF COPPER: CPT

## 2025-08-04 PROCEDURE — 82570 ASSAY OF URINE CREATININE: CPT

## 2025-08-05 ENCOUNTER — PATIENT MESSAGE (OUTPATIENT)
Age: 65
End: 2025-08-05

## 2025-08-06 ENCOUNTER — CONSULT (OUTPATIENT)
Age: 65
End: 2025-08-06
Payer: COMMERCIAL

## 2025-08-06 VITALS
DIASTOLIC BLOOD PRESSURE: 84 MMHG | HEIGHT: 68 IN | HEART RATE: 86 BPM | SYSTOLIC BLOOD PRESSURE: 138 MMHG | WEIGHT: 247 LBS | BODY MASS INDEX: 37.44 KG/M2 | TEMPERATURE: 96.5 F

## 2025-08-06 DIAGNOSIS — L98.9 CHANGING SKIN LESION: ICD-10-CM

## 2025-08-06 PROCEDURE — 11308 SHAVE SKIN LESION >2.0 CM: CPT

## 2025-08-06 PROCEDURE — 99203 OFFICE O/P NEW LOW 30 MIN: CPT

## 2025-08-07 ENCOUNTER — OFFICE VISIT (OUTPATIENT)
Age: 65
End: 2025-08-07
Payer: COMMERCIAL

## 2025-08-07 VITALS
DIASTOLIC BLOOD PRESSURE: 80 MMHG | HEIGHT: 68 IN | WEIGHT: 248.7 LBS | BODY MASS INDEX: 37.69 KG/M2 | HEART RATE: 80 BPM | SYSTOLIC BLOOD PRESSURE: 130 MMHG

## 2025-08-07 DIAGNOSIS — R26.2 AMBULATORY DYSFUNCTION: ICD-10-CM

## 2025-08-07 DIAGNOSIS — G62.9 PERIPHERAL POLYNEUROPATHY: ICD-10-CM

## 2025-08-07 DIAGNOSIS — R29.898 WEAKNESS OF RIGHT LOWER EXTREMITY: Primary | ICD-10-CM

## 2025-08-07 LAB
COPPER 24H UR-MRATE: 7 UG/24 HR (ref 3–35)
COPPER UR-MCNC: 2 UG/L
COPPER/CREAT UR: 8 UG/G CREAT (ref 0–49)
CREAT UR-MCNC: 0.25 G/L (ref 0.3–3)

## 2025-08-07 PROCEDURE — 99203 OFFICE O/P NEW LOW 30 MIN: CPT | Performed by: PHYSICAL MEDICINE & REHABILITATION

## 2025-08-11 ENCOUNTER — HOSPITAL ENCOUNTER (OUTPATIENT)
Dept: MAMMOGRAPHY | Facility: MEDICAL CENTER | Age: 65
Discharge: HOME | End: 2025-08-11
Attending: FAMILY MEDICINE
Payer: COMMERCIAL

## 2025-08-12 ENCOUNTER — DOCUMENTATION (OUTPATIENT)
Dept: CASE MANAGEMENT | Facility: OTHER | Age: 65
End: 2025-08-12

## 2025-08-13 ENCOUNTER — PATIENT MESSAGE (OUTPATIENT)
Age: 65
End: 2025-08-13

## 2025-08-14 ENCOUNTER — DOCUMENTATION (OUTPATIENT)
Dept: CASE MANAGEMENT | Facility: OTHER | Age: 65
End: 2025-08-14

## 2025-08-17 PROBLEM — Z00.00 MEDICARE ANNUAL WELLNESS VISIT, SUBSEQUENT: Status: RESOLVED | Noted: 2025-07-18 | Resolved: 2025-08-17

## 2025-08-19 ENCOUNTER — EVALUATION (OUTPATIENT)
Dept: PHYSICAL THERAPY | Facility: CLINIC | Age: 65
End: 2025-08-19
Attending: FAMILY MEDICINE
Payer: COMMERCIAL

## 2025-08-19 ENCOUNTER — TELEPHONE (OUTPATIENT)
Age: 65
End: 2025-08-19

## 2025-08-19 DIAGNOSIS — G62.9 PERIPHERAL POLYNEUROPATHY: ICD-10-CM

## 2025-08-19 DIAGNOSIS — R26.2 AMBULATORY DYSFUNCTION: ICD-10-CM

## 2025-08-19 DIAGNOSIS — M54.16 LUMBAR RADICULITIS: ICD-10-CM

## 2025-08-19 PROCEDURE — 97110 THERAPEUTIC EXERCISES: CPT

## 2025-08-19 PROCEDURE — 97161 PT EVAL LOW COMPLEX 20 MIN: CPT

## 2025-08-20 ENCOUNTER — OFFICE VISIT (OUTPATIENT)
Age: 65
End: 2025-08-20
Payer: COMMERCIAL

## 2025-08-20 DIAGNOSIS — L98.9 CHANGING SKIN LESION: Primary | ICD-10-CM

## 2025-08-20 PROCEDURE — 99213 OFFICE O/P EST LOW 20 MIN: CPT

## (undated) DEVICE — ACE WRAP 4 IN UNSTERILE

## (undated) DEVICE — SPONGE PVP SCRUB WING STERILE

## (undated) DEVICE — HEAVY DUTY TABLE COVER: Brand: CONVERTORS

## (undated) DEVICE — GLOVE SRG BIOGEL 7.5

## (undated) DEVICE — DRAPE EQUIPMENT RF WAND

## (undated) DEVICE — PENCIL ELECTROSURG E-Z CLEAN -0035H

## (undated) DEVICE — SUT VICRYL PLUS 0 CTB-1 27 IN VCPB260H

## (undated) DEVICE — HOOD: Brand: FLYTE, SURGICOOL

## (undated) DEVICE — DUAL CUT SAGITTAL BLADE

## (undated) DEVICE — PAD GROUNDING ADULT

## (undated) DEVICE — CAPIT ASCEND FLEX TSA W PERF PLUS

## (undated) DEVICE — INTENDED FOR TISSUE SEPARATION, AND OTHER PROCEDURES THAT REQUIRE A SHARP SURGICAL BLADE TO PUNCTURE OR CUT.: Brand: BARD-PARKER SAFETY BLADES SIZE 10, STERILE

## (undated) DEVICE — SUT VICRYL PLUS 2-0 CTB-1 27 IN VCPB259H

## (undated) DEVICE — SUT 2 ORTHOCORD MO7

## (undated) DEVICE — GUIDE PIN 2.5 X 220MM AEQUALIS PERFORM PLUS

## (undated) DEVICE — DRAPE C-ARM X-RAY

## (undated) DEVICE — DRESSING MEPILEX BORDER 4 X 8 IN

## (undated) DEVICE — 3M™ IOBAN™ 2 ANTIMICROBIAL INCISE DRAPE 6650EZ: Brand: IOBAN™ 2

## (undated) DEVICE — DRESSING MEPILEX AG BORDER 4 X 8 IN

## (undated) DEVICE — ARTHROSCOPY FLOOR MAT

## (undated) DEVICE — 2.0MM DRILL BIT/QC/125MM

## (undated) DEVICE — IMPERVIOUS STOCKINETTE: Brand: DEROYAL

## (undated) DEVICE — INTENT TO BE USED WITH SUTURE MATERIAL FOR TISSUE CLOSURE: Brand: RICHARD-ALLAN®  NEEDLE 1/2 CIRCLE REVERSE CUTTING

## (undated) DEVICE — SUT VICRYL 3-0 SH 27 IN J416H

## (undated) DEVICE — KIT STABILIZATION SHOULDER MARCO

## (undated) DEVICE — SPONGE 4 X 4 XRAY 16 PLY STRL LF RFD

## (undated) DEVICE — 2.5MM DRILL BIT/QC/GOLD/110MM

## (undated) DEVICE — PROXIMATE PLUS MD MULTI-DIRECTIONAL RELEASE SKIN STAPLERS CONTAINS 35 STAINLESS STEEL STAPLES APPROXIMATE CLOSED DIMENSIONS: 6.9MM X 3.9MM WIDE: Brand: PROXIMATE

## (undated) DEVICE — SUT 2 ORTHOCORD 36 IN W/O NEEDLES

## (undated) DEVICE — DRILL BIT 3.2MM

## (undated) DEVICE — SPLINT 5 X 30 IN FAST SET PLASTER

## (undated) DEVICE — BETHLEHEM UNIV MAJOR ORTHO,KIT: Brand: CARDINAL HEALTH

## (undated) DEVICE — SKN PREP SPNG STKS PVP PNT STR: Brand: MEDLINE INDUSTRIES, INC.

## (undated) DEVICE — GLOVE INDICATOR PI UNDERGLOVE SZ 7.5 BLUE

## (undated) DEVICE — CAPIT SHLDR ASCEND FLEX REVERSE W PERFORM

## (undated) DEVICE — DISPOSABLE EQUIPMENT COVER: Brand: SMALL TOWEL DRAPE

## (undated) DEVICE — PADDING CAST 4 IN  COTTON STRL

## (undated) DEVICE — DRESSING XEROFORM 5 X 9

## (undated) DEVICE — CHLORAPREP HI-LITE 26ML ORANGE

## (undated) DEVICE — TUBING SUCTION 5MM X 12 FT

## (undated) DEVICE — HANDPIECE SET WITH RETRACTABLE COAXIAL FAN SPRAY TIP AND SUCTION TUBE: Brand: INTERPULSE

## (undated) DEVICE — THE SIMPULSE SOLO SYSTEM WITH ULTREX RETRACTABLE SPLASH SHIELD TIP: Brand: SIMPULSE SOLO

## (undated) DEVICE — STERILE BETHLEHEM PLASTIC HAND: Brand: CARDINAL HEALTH

## (undated) DEVICE — GAUZE SPONGES,16 PLY: Brand: CURITY

## (undated) DEVICE — 2.0MM DRILL BIT WITH DEPTH MARK/QC/140MM

## (undated) DEVICE — SUT PROLENE 4-0 PS-2 18 IN 8682G

## (undated) DEVICE — GLOVE INDICATOR PI UNDERGLOVE SZ 8 BLUE

## (undated) DEVICE — PACK MAJOR ORTHO W/SPLITS PBDS

## (undated) DEVICE — INTENDED FOR TISSUE SEPARATION, AND OTHER PROCEDURES THAT REQUIRE A SHARP SURGICAL BLADE TO PUNCTURE OR CUT.: Brand: BARD-PARKER SAFETY BLADES SIZE 15, STERILE

## (undated) DEVICE — HOOD: Brand: T7PLUS

## (undated) DEVICE — ACE WRAP 6 IN UNSTERILE

## (undated) DEVICE — ABDOMINAL PAD: Brand: DERMACEA